# Patient Record
Sex: MALE | Race: WHITE | NOT HISPANIC OR LATINO | Employment: OTHER | URBAN - METROPOLITAN AREA
[De-identification: names, ages, dates, MRNs, and addresses within clinical notes are randomized per-mention and may not be internally consistent; named-entity substitution may affect disease eponyms.]

---

## 2021-01-18 ENCOUNTER — HOSPITAL ENCOUNTER (OUTPATIENT)
Facility: CLINIC | Age: 61
Setting detail: SPECIMEN
Discharge: HOME | End: 2021-01-18
Admitting: INTERNAL MEDICINE
Payer: COMMERCIAL

## 2021-01-20 ENCOUNTER — TELEPHONE (OUTPATIENT)
Dept: TRANSPLANT | Facility: CLINIC | Age: 61
End: 2021-01-20

## 2021-01-20 DIAGNOSIS — C91.00 ALL (ACUTE LYMPHOCYTIC LEUKEMIA) (H): Primary | ICD-10-CM

## 2021-02-02 ENCOUNTER — HOSPITAL ENCOUNTER (OUTPATIENT)
Facility: CLINIC | Age: 61
Setting detail: SPECIMEN
Discharge: HOME | End: 2021-02-02
Admitting: INTERNAL MEDICINE
Payer: COMMERCIAL

## 2021-02-04 ENCOUNTER — ALLIED HEALTH/NURSE VISIT (OUTPATIENT)
Dept: TRANSPLANT | Facility: CLINIC | Age: 61
End: 2021-02-04
Attending: INTERNAL MEDICINE
Payer: COMMERCIAL

## 2021-02-04 ENCOUNTER — RESULTS ONLY (OUTPATIENT)
Dept: OTHER | Facility: CLINIC | Age: 61
End: 2021-02-04

## 2021-02-04 ENCOUNTER — MEDICAL CORRESPONDENCE (OUTPATIENT)
Dept: TRANSPLANT | Facility: CLINIC | Age: 61
End: 2021-02-04

## 2021-02-04 VITALS — SYSTOLIC BLOOD PRESSURE: 144 MMHG | WEIGHT: 245 LBS | DIASTOLIC BLOOD PRESSURE: 78 MMHG | HEART RATE: 86 BPM

## 2021-02-04 DIAGNOSIS — C91.00 ALL (ACUTE LYMPHOCYTIC LEUKEMIA) (H): Primary | ICD-10-CM

## 2021-02-04 DIAGNOSIS — Z71.9 VISIT FOR COUNSELING: Primary | ICD-10-CM

## 2021-02-04 DIAGNOSIS — C91.00 ACUTE LYMPHOBLASTIC LEUKEMIA (ALL) NOT HAVING ACHIEVED REMISSION (H): Primary | ICD-10-CM

## 2021-02-04 PROCEDURE — 36415 COLL VENOUS BLD VENIPUNCTURE: CPT

## 2021-02-04 PROCEDURE — 81382 HLA II TYPING 1 LOC HR: CPT | Performed by: INTERNAL MEDICINE

## 2021-02-04 PROCEDURE — 81378 HLA I & II TYPING HR: CPT | Performed by: INTERNAL MEDICINE

## 2021-02-04 PROCEDURE — 99205 OFFICE O/P NEW HI 60 MIN: CPT | Performed by: INTERNAL MEDICINE

## 2021-02-04 PROCEDURE — 86832 HLA CLASS I HIGH DEFIN QUAL: CPT | Performed by: INTERNAL MEDICINE

## 2021-02-04 PROCEDURE — 86828 HLA CLASS I&II ANTIBODY QUAL: CPT | Performed by: INTERNAL MEDICINE

## 2021-02-04 PROCEDURE — G0463 HOSPITAL OUTPT CLINIC VISIT: HCPCS

## 2021-02-04 PROCEDURE — 999N001098 HC STATISTIC HLA ABY PRA SCREEN CLASS II: Performed by: INTERNAL MEDICINE

## 2021-02-04 RX ORDER — ALLOPURINOL 100 MG/1
100 TABLET ORAL DAILY
COMMUNITY
Start: 2021-01-13 | End: 2021-07-08

## 2021-02-04 RX ORDER — LORAZEPAM 1 MG/1
1 TABLET ORAL
COMMUNITY
Start: 2021-01-13 | End: 2021-11-09

## 2021-02-04 RX ORDER — ATORVASTATIN CALCIUM 20 MG/1
20 TABLET, FILM COATED ORAL DAILY
COMMUNITY
Start: 2019-07-08 | End: 2021-07-08

## 2021-02-04 RX ORDER — ACETAMINOPHEN 500 MG
500-1000 TABLET ORAL EVERY 6 HOURS PRN
COMMUNITY
End: 2021-10-19

## 2021-02-04 RX ORDER — METAXALONE 400 MG/1
800 TABLET ORAL PRN
Status: ON HOLD | COMMUNITY
Start: 2021-01-08 | End: 2021-08-20

## 2021-02-04 RX ORDER — HYDROCHLOROTHIAZIDE 12.5 MG/1
12.5 TABLET ORAL DAILY
COMMUNITY
Start: 2021-01-30 | End: 2021-07-08

## 2021-02-04 RX ORDER — LEVOTHYROXINE SODIUM 200 UG/1
200 TABLET ORAL DAILY
COMMUNITY
Start: 2021-01-11 | End: 2021-07-08

## 2021-02-04 RX ORDER — DEXAMETHASONE 4 MG/1
4 TABLET ORAL PRN
COMMUNITY
Start: 2021-01-13 | End: 2021-07-08

## 2021-02-04 RX ORDER — SULFAMETHOXAZOLE/TRIMETHOPRIM 800-160 MG
1 TABLET ORAL 2 TIMES DAILY
COMMUNITY
Start: 2021-01-13 | End: 2021-07-08

## 2021-02-04 NOTE — NURSING NOTE
Oncology Rooming Note    February 4, 2021 12:20 PM   Nik Nava is a 60 year old male who presents for:    Chief Complaint   Patient presents with     Consult     LEUKEMIA     Initial Vitals: BP (!) 144/78   Pulse 86   Wt 111.1 kg (245 lb)  There is no height or weight on file to calculate BMI. There is no height or weight on file to calculate BSA.  Data Unavailable Comment: Data Unavailable   No LMP for male patient.  Allergies reviewed: Yes  Medications reviewed: Yes    Medications: Medication refills not needed today.  Pharmacy name entered into EPIC: Data Unavailable    Clinical concerns: Pt developed chest and back rash.     Debbie Rubio, CMA

## 2021-02-04 NOTE — PROGRESS NOTES
BP (!) 144/78   Pulse 86   Wt 111.1 kg (245 lb)     This is a new consultation visit for this 60-year-old man with a new diagnosis of Ph positive ALL.  His past medical history includes hypothyroidism, renal cancer in 2007 and GERD plus chronic renal insufficiency from his post nephrectomy state with a creatinine ranging from 1.3-1.5 over the last 10 years.  He is recently been prescribed low-dose hydrochlorothiazide.    He was not feeling particularly unwell in late 2020,  but eventually had a physical exam and laboratory testing which demonstrated leukocytosis and blasts in the blood.  His hemoglobin and platelets were normal but his white count was 13.6;  40% peripheral blasts and a bone marrow biopsy was hypercellular with 80% blasts.  BCR abl  P190 was recognized and cytogenetics showed a [place] chromosome and additionally monosomy 7 in 14 of 20 metaphases.  Flow showed the blasts positive for CD34, 10,58,38,19,13 and Tdt.     Spinal fluid exam showed 1 white cell 3 red cells but 11% of the cells by flow were lymphoblasts so his CSF is positive, though at a low level.  Scans showed no lymphadenopathy or hepatosplenomegaly and a MRI scan of his head was unremarkable.    He has been initiated on therapy with a week of dexamethasone and daily dasatinib 140 mg.  The dexamethasone led to tremors shakiness and feeling unwell but has had no acute symptoms that are continuing after the dexamethasone was discontinued.  In the last few days he has developed a nonpruritic erythematous rash over his upper chest, all over his back and his neck.  He was not aware of it but his wife pointed it out.  His medications potentially implicated in the rash include trimethoprim sulfa and low-dose allopurinol.    He is still working in an office.  He used to be a smoker but no longer..  He is not aware of any known food or drug allergies.  Family history includes lung cancer in his mother but no other family history of blood  disorders.  He has 4 healthy siblings and 3 healthy children in their late 30s or early 40s.  HLA typing on his siblings are pending.    His recent review of systems identifies a bit of fatigue, the new rash but no bleeding bruising bone discomfort GI  or respiratory symptoms.  Maybe his energy is a bit down but it is modest.    His exam shows his weight unchanged in the last 3 months and his vital signs were acceptable.  He had a nonraised erythematous macular rash on the upper chest all over his back and spreading up his neck.  He had no peripheral edema or bone tenderness in any site.  He had no cervical supraclavicular axillary or inguinal lymphadenopathy.  His oropharynx is clear without mucosal lesions.  His neck was supple.  His lungs are clear without rales or wheezing.  His heart tones are regular without a gallop. he had no palpable hepatosplenomegaly or abdominal masses.  A limited neurologic exam showed intact cranial nerves, trace at most deep tendon reflexes, but heel toe Romberg finger-nose and rapid alternating movement testing was negative.  (He denied any neuropathy or paresthesias  from his renal cancer treatment 13 years ago and has no recollection of such, despite his nearly absent reflexes).    Recent laboratory testing showed a creatinine between 1.3 and 1.5, the positive low level spinal fluid abnormality, slightly elevated LDH at diagnosis but now his blood counts responding to the dasatinib and labs from February 2 showed a hemoglobin of 11.3, white count 3700 platelets 107,000.     We discussed the relevant management of Ph positive  ALL. The initial treatment with dexamethasone and dasatinib is a good start but he will likely need conventional multidrug chemotherapy beginning soon.  It would be reasonable to, in the absence of circulating blasts, repeat a marrow at 4 weeks of dasatinib and then consider consolidation therapy with either vincristine prednisone Adriamycin Lasparaginase or  hyper CVAD.  He will need intrathecal methotrexate or high-dose methotrexate plus rescue for treatment of  the low level CSF leukemia that is been recognized.    We spent the majority of time discussing consolidation post remission therapy if he achieves a complete molecular remission.  We reviewed the rationale of allogeneic hematopoietic cell transplantation using either a sibling, unrelated donor, umbilical cord blood or a haploidentical child as the donor and discussed how we make decisions which will to some extent be pursued after his siblings' tissue typing is completed.  We reviewed the plan to use  intermediate intensity chemotherapy including low-dose radiation for tumor depletion and immunosuppression followed by infusion of the donor graft and depending on the donor type additional medications for khsvb-thgohc-qrgn disease prophylaxis.    We reviewed the possible complications of treatment including infections need for transfusion risks of graft failure, oizyw-qwbpis-fqhv disease and risks of relapsed leukemia.  Post transplant TKI maintenance therapy would be indicated for at least 1 year following transplantation.    The next decision steps are dependent on his response to his current reinduction therapy with dasatinib and dexamethasone and follow-up consolidation or further induction therapy.  We will inform him and his outside physician of the results of donor identification process and today he signed consent to participate in the blood marrow transplant clinical trials network (BMT CTN) observational study of follow-up and microbiome analyses if he has an alternative donor transplant should his siblings not match.    His questions were answered in full and we will be discussing more as his course unfolds.  We are hopeful that he will achieve remission promptly and would then be a good candidate for allograft.    HLA typing and Anti HLA antibody testing was done today.    Chapo Hill  MD    Professor of medicine

## 2021-02-04 NOTE — NURSING NOTE
Peripheral labs drawn from right arm in clinic by writer.     Jolanta Mcintyre CMA February 4, 2021

## 2021-02-04 NOTE — LETTER
2/4/2021         RE: Nik Nava  [address] [facility] [id number]        Dear Colleague,    Thank you for referring your patient, Nik Nava, to the [facility] BLOOD AND MARROW TRANSPLANT PROGRAM [place]. Please see a copy of my visit note below.    BP (!) 144/78   Pulse 86   Wt 111.1 kg (245 lb)     This is a new consultation visit for this 60-year-old man with a new diagnosis of Ph positive ALL.  His past medical history includes hypothyroidism, renal cancer in 2007 and GERD plus chronic renal insufficiency from his post nephrectomy state with a creatinine ranging from 1.3-1.5 over the last 10 years.  He is recently been prescribed low-dose hydrochlorothiazide.    He was not feeling particularly unwell in late 2020,  but eventually had a physical exam and laboratory testing which demonstrated leukocytosis and blasts in the blood.  His hemoglobin and platelets were normal but his white count was 13.6;  40% peripheral blasts and a bone marrow biopsy was hypercellular with 80% blasts.  BCR abl  P190 was recognized and cytogenetics showed a [place] chromosome and additionally monosomy 7 in 14 of 20 metaphases.  Flow showed the blasts positive for CD34, 10,58,38,19,13 and Tdt.     Spinal fluid exam showed 1 white cell 3 red cells but 11% of the cells by flow were lymphoblasts so his CSF is positive, though at a low level.  Scans showed no lymphadenopathy or hepatosplenomegaly and a MRI scan of his head was unremarkable.    He has been initiated on therapy with a week of dexamethasone and daily dasatinib 140 mg.  The dexamethasone led to tremors shakiness and feeling unwell but has had no acute symptoms that are continuing after the dexamethasone was discontinued.  In the last few days he has developed a nonpruritic erythematous rash over his upper chest, all over his back and his neck.  He was not aware of it but his wife pointed it out.  His medications potentially implicated  in the rash include trimethoprim sulfa and low-dose allopurinol.    He is still working in an office.  He used to be a smoker but no longer..  He is not aware of any known food or drug allergies.  Family history includes lung cancer in his mother but no other family history of blood disorders.  He has 4 healthy siblings and 3 healthy children in their late 30s or early 40s.  HLA typing on his siblings are pending.    His recent review of systems identifies a bit of fatigue, the new rash but no bleeding bruising bone discomfort GI  or respiratory symptoms.  Maybe his energy is a bit down but it is modest.    His exam shows his weight unchanged in the last 3 months and his vital signs were acceptable.  He had a nonraised erythematous macular rash on the upper chest all over his back and spreading up his neck.  He had no peripheral edema or bone tenderness in any site.  He had no cervical supraclavicular axillary or inguinal lymphadenopathy.  His oropharynx is clear without mucosal lesions.  His neck was supple.  His lungs are clear without rales or wheezing.  His heart tones are regular without a gallop. he had no palpable hepatosplenomegaly or abdominal masses.  A limited neurologic exam showed intact cranial nerves, trace at most deep tendon reflexes, but heel toe Romberg finger-nose and rapid alternating movement testing was negative.  (He denied any neuropathy or paresthesias  from his renal cancer treatment 13 years ago and has no recollection of such, despite his nearly absent reflexes).    Recent laboratory testing showed a creatinine between 1.3 and 1.5, the positive low level spinal fluid abnormality, slightly elevated LDH at diagnosis but now his blood counts responding to the dasatinib and labs from February 2 showed a hemoglobin of 11.3, white count 3700 platelets 107,000.     We discussed the relevant management of Ph positive  ALL. The initial treatment with dexamethasone and dasatinib is a good start  but he will likely need conventional multidrug chemotherapy beginning soon.  It would be reasonable to, in the absence of circulating blasts, repeat a marrow at 4 weeks of dasatinib and then consider consolidation therapy with either vincristine prednisone Adriamycin Lasparaginase or hyper CVAD.  He will need intrathecal methotrexate or high-dose methotrexate plus rescue for treatment of  the low level CSF leukemia that is been recognized.    We spent the majority of time discussing consolidation post remission therapy if he achieves a complete molecular remission.  We reviewed the rationale of allogeneic hematopoietic cell transplantation using either a sibling, unrelated donor, umbilical cord blood or a haploidentical child as the donor and discussed how we make decisions which will to some extent be pursued after his siblings' tissue typing is completed.  We reviewed the plan to use  intermediate intensity chemotherapy including low-dose radiation for tumor depletion and immunosuppression followed by infusion of the donor graft and depending on the donor type additional medications for acpeb-bgprgv-cbbs disease prophylaxis.    We reviewed the possible complications of treatment including infections need for transfusion risks of graft failure, wgzft-kwkwjj-pnbb disease and risks of relapsed leukemia.  Post transplant TKI maintenance therapy would be indicated for at least 1 year following transplantation.    The next decision steps are dependent on his response to his current reinduction therapy with dasatinib and dexamethasone and follow-up consolidation or further induction therapy.  We will inform him and his outside physician of the results of donor identification process and today he signed consent to participate in the blood marrow transplant clinical trials network (BMT CTN) observational study of follow-up and microbiome analyses if he has an alternative donor transplant should his siblings not  match.    His questions were answered in full and we will be discussing more as his course unfolds.  We are hopeful that he will achieve remission promptly and would then be a good candidate for allograft.    HLA typing and Anti HLA antibody testing was done today.    Chapo Hill MD    Professor of medicine

## 2021-02-05 NOTE — PROGRESS NOTES
"Blood and Marrow Transplant   New Transplant Visit with   Clinical     Assessment completed on 21 via phone as part of the COVID 19 protocol. Assessment of living situation, support system, financial status, functional status, coping, stressors, need for resources and social work intervention provided as needed. Information for this assessment was provided by pt and pt's spouse's report in addition to medical chart review and consultation with medical team.     Present:  Patient: Nik Nava  Spouse: Lamar Nava  : MATEO Garcia, [facility]    Medical Team   Nurse Coordinator: Jessika Starkey RN  BMT Physician: Chapo Hill MD  Referring Physician: Wojciech Soares MD    Presenting Information:  Pt is a 60 year old male diagnosed with Acute Lymphoblastic Leukemia. Pt was diagnosed on 2021. Pt presents for an allogeneic stem cell transplant discussion.    Contact Information:  Cell Phone: [phone]  Home Phone: [phone]  Wife Cell Phone: [phone]    Special Needs:   No needs identified at this time.     Relocation Requirement:   Pt lives in [place], [place]28 miles[place] [facility] within the required distance of the hospital. Pt does not need to relocate.     Living Situation:   Pt lives in a house w/ his spouse Lamar.    Family Information:   Spouse: Lamar Nava   Parents:   Siblings: Valentín, Mau, Jenise, & Uma (all live in the [facility] area)  Children: 3 -Son Chapo, Dtr Bouchra Fisher, & Dtr Lisa  Pt & spouse have 13 grandchildren.    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 No insurance concerns identified at this time. SW " provided information regarding the insurance authorization process and the role of the BMT Financial . SW provided contact info for the BMT Financial  and referred pt to them for future insurance questions.     Finances:   Pt & spouse are supported by their employment income and report add'l assets. Pt states he was encouraged by Dr. Hill to make arrangements for increased time off work. Pt had questions re: applying for SSDI. SW answered questions as able as well as referred Pt to Cancer Legal Line for add'l assistance. No financial concerns identified at this time. SW discussed aziza options and asked pt to let SW know if they would like to apply in the future.     Caregiver:   SW discussed with the patient and his spouse the caregiver role and expectation at length. Pt is agreeable to having a full time caregiver for the minimum of 100 days until cleared by the BMT Physician. Pt's identified caregiver is his wife Lamar. Pt states his older brothers and their wives are retired and may be able to assist as well. Caregiver education and information provided. No caregiver concerns identified.     Healthcare Directive:  Yes. Pt has a health care directive and will bring it when they return to the BMT program.     Resources Provided:  -BMT Information Book  -BMT Resources Packet  -Caregiver Contract/Description  -Transplant Unit Description and Information     Identified Concerns:  No concerns identified at this time.     Summary:  Pt presents to [facility] regarding an allogeneic stem cell transplant. Pt and pt's spouse asked good/appropriate questions regarding psychosocial factors related to BMT; all questions were addressed. Pt presented as pleasant, engaged, open to SW visit. Pt's affect was appropriate. Family's affect was appropriate and supportive.     Plan:   SW provided contact information and encouraged pt to contact SW with any additional questions,  concerns, resources and/or for support. SW will continue to follow pt to provide support and guidance with resources as needed.     MATEO Garcia, [facility]  Adult Blood & Marrow Transplant   Phone: [phone]  Pager: [phone]

## 2021-02-07 NOTE — PROGRESS NOTES
Met with pt and family today at hisconsult for possible allo transplant. I reviewed the role of the Nurse coordinator, and gave the pt contact information if they have any other questions. Pt has 4 sibs to be typed, 3 kids who will not be typed at this time. Also signed 1702 and urd consent today. Hla/pra drawn. Pt will call us with sib info

## 2021-02-08 LAB
SA1 CELL: NORMAL
SA1 COMMENTS: NORMAL
SA1 HI RISK ABY: NORMAL
SA1 MOD RISK ABY: NORMAL
SA1 TEST METHOD: NORMAL
SCR 1 TEST METHOD: NORMAL
SCR1 CELL: NORMAL
SCR1 COMMENTS: NORMAL
SCR1 RESULT: NORMAL
SCR2 CELL: NORMAL
SCR2 COMMENTS: NORMAL
SCR2 RESULT: NORMAL
SCR2 TEST METHOD: NORMAL

## 2021-02-11 LAB
A*LOCUS SEROLOGIC EQUIVALENT: 3
A*LOCUS: NORMAL
AB TEST METHOD: NORMAL
B*: NORMAL
B*LOCUS SEROLOGIC EQUIVALENT: 7
B*LOCUS: NORMAL
B*SEROLOGIC EQUIVALENT: 49
BW-1: NORMAL
BW-2: NORMAL
C*: NORMAL
C*LOCUS SEROLOGIC EQUIVALENT: 7
C*LOCUS: NORMAL
C*SEROLOGIC EQUIVALENT: 7
DPA1*: NORMAL
DPB1*: NORMAL
DPB1*LOCUS NMDP: NORMAL
DPB1*LOCUS: NORMAL
DPB1*NMDP: NORMAL
DQA1*: NORMAL
DQA1*LOCUS: NORMAL
DQB1*: NORMAL
DQB1*LOCUS SEROLOGIC EQUIVALENT: 7
DQB1*LOCUS: NORMAL
DQB1*SEROLOGIC EQUIVALENT: 6
DRB1*: NORMAL
DRB1*LOCUS SEROLOGIC EQUIVALENT: 13
DRB1*LOCUS: NORMAL
DRB1*SEROLOGIC EQUIVALENT: 4
DRB3*LOCUS SEROLOGIC EQUIVALENT: 52
DRB3*LOCUS: NORMAL
DRB4*: NORMAL
DRB4*SEROLOGIC EQUIVALENT: 53
DRSSO TEST METHOD: NORMAL

## 2021-03-15 ENCOUNTER — DOCUMENTATION ONLY (OUTPATIENT)
Dept: TRANSPLANT | Facility: CLINIC | Age: 61
End: 2021-03-15

## 2021-03-15 NOTE — PROGRESS NOTES
Nik Nava's medical conditions were reviewed at the BMT Protocol Review Committee meeting on March 15, 2021    Considerations from the Committee included the following: Ph+ ALL with baseline Cr 1.3-1.5    BMT Primary Protocol: CTN 1703 (alternative MT 2015-32)    BMT Ancillary Protocols:     CTN 1702    If in [id number] may also be FMT 2018-01    CTN 1704    There is no problem list on file for this patient.      Patient Care Team       Relationship Specialty Notifications Start Kisha Hernandez MD PCP - General Family Medicine  1/25/21     Wojciech Soares MD Medical Oncology  1/31/21     Phone: [phone] Fax: [phone]         [place] [facility] [address] [address] [facility] [address]    Chapo Hill MD Assigned Cancer Care Provider   2/7/21     Phone: [phone] Fax: [phone]         [address] [facility] [address] [facility] [address]

## 2021-04-11 ENCOUNTER — HEALTH MAINTENANCE LETTER (OUTPATIENT)
Age: 61
End: 2021-04-11

## 2021-06-07 ENCOUNTER — MEDICAL CORRESPONDENCE (OUTPATIENT)
Dept: TRANSPLANT | Facility: CLINIC | Age: 61
End: 2021-06-07

## 2021-07-01 DIAGNOSIS — Z11.59 SPECIAL SCREENING EXAMINATION FOR VIRAL DISEASE: ICD-10-CM

## 2021-07-01 DIAGNOSIS — C91.01 ACUTE LYMPHOBLASTIC LEUKEMIA (ALL) IN REMISSION (H): ICD-10-CM

## 2021-07-01 DIAGNOSIS — Z01.818 PREOP EXAMINATION: ICD-10-CM

## 2021-07-02 RX ORDER — HEPARIN SODIUM (PORCINE) LOCK FLUSH IV SOLN 100 UNIT/ML 100 UNIT/ML
5 SOLUTION INTRAVENOUS
Status: CANCELLED | OUTPATIENT
Start: 2021-07-08

## 2021-07-02 RX ORDER — HEPARIN SODIUM,PORCINE 10 UNIT/ML
5 VIAL (ML) INTRAVENOUS
Status: CANCELLED | OUTPATIENT
Start: 2021-07-08

## 2021-07-06 ENCOUNTER — TELEPHONE (OUTPATIENT)
Dept: TRANSPLANT | Facility: CLINIC | Age: 61
End: 2021-07-06

## 2021-07-06 NOTE — TELEPHONE ENCOUNTER
Lamar Nava called to get clarifications on work up schedule.     Confirmed hold Xarelto for 2 days prior to lumbar puncture (sat and sun evenings).

## 2021-07-08 ENCOUNTER — MEDICAL CORRESPONDENCE (OUTPATIENT)
Dept: TRANSPLANT | Facility: CLINIC | Age: 61
End: 2021-07-08

## 2021-07-08 ENCOUNTER — ANCILLARY PROCEDURE (OUTPATIENT)
Dept: GENERAL RADIOLOGY | Facility: CLINIC | Age: 61
End: 2021-07-08
Attending: INTERNAL MEDICINE
Payer: COMMERCIAL

## 2021-07-08 ENCOUNTER — APPOINTMENT (OUTPATIENT)
Dept: LAB | Facility: CLINIC | Age: 61
End: 2021-07-08
Attending: INTERNAL MEDICINE
Payer: COMMERCIAL

## 2021-07-08 ENCOUNTER — ALLIED HEALTH/NURSE VISIT (OUTPATIENT)
Dept: TRANSPLANT | Facility: CLINIC | Age: 61
End: 2021-07-08
Attending: INTERNAL MEDICINE
Payer: COMMERCIAL

## 2021-07-08 ENCOUNTER — ONCOLOGY VISIT (OUTPATIENT)
Dept: TRANSPLANT | Facility: CLINIC | Age: 61
End: 2021-07-08
Attending: PHYSICIAN ASSISTANT
Payer: COMMERCIAL

## 2021-07-08 ENCOUNTER — RESULTS ONLY (OUTPATIENT)
Dept: TRANSPLANT | Facility: CLINIC | Age: 61
End: 2021-07-08

## 2021-07-08 VITALS
OXYGEN SATURATION: 98 % | WEIGHT: 236 LBS | BODY MASS INDEX: 31.28 KG/M2 | DIASTOLIC BLOOD PRESSURE: 61 MMHG | HEART RATE: 85 BPM | SYSTOLIC BLOOD PRESSURE: 136 MMHG | HEIGHT: 73 IN | RESPIRATION RATE: 18 BRPM | TEMPERATURE: 97.5 F

## 2021-07-08 DIAGNOSIS — C91.01 ACUTE LYMPHOBLASTIC LEUKEMIA (ALL) IN REMISSION (H): ICD-10-CM

## 2021-07-08 DIAGNOSIS — Z01.818 PREOP EXAMINATION: ICD-10-CM

## 2021-07-08 DIAGNOSIS — Z11.59 SPECIAL SCREENING EXAMINATION FOR VIRAL DISEASE: ICD-10-CM

## 2021-07-08 DIAGNOSIS — Z01.818 PREOP EXAMINATION: Primary | ICD-10-CM

## 2021-07-08 LAB
ABO + RH BLD: NORMAL
ABO + RH BLD: NORMAL
ALBUMIN SERPL-MCNC: 3.7 G/DL (ref 3.4–5)
ALBUMIN UR-MCNC: NEGATIVE MG/DL
ALP SERPL-CCNC: 60 U/L (ref 40–150)
ALT SERPL W P-5'-P-CCNC: 28 U/L (ref 0–70)
ANION GAP SERPL CALCULATED.3IONS-SCNC: 7 MMOL/L (ref 3–14)
APPEARANCE UR: CLEAR
APTT PPP: 32 SEC (ref 22–37)
AST SERPL W P-5'-P-CCNC: 22 U/L (ref 0–45)
BASOPHILS # BLD AUTO: 0 10E9/L (ref 0–0.2)
BASOPHILS NFR BLD AUTO: 0.5 %
BILIRUB SERPL-MCNC: 0.3 MG/DL (ref 0.2–1.3)
BILIRUB UR QL STRIP: NEGATIVE
BLD GP AB SCN SERPL QL: NORMAL
BLOOD BANK CMNT PATIENT-IMP: NORMAL
BUN SERPL-MCNC: 24 MG/DL (ref 7–30)
CALCIUM SERPL-MCNC: 8.9 MG/DL (ref 8.5–10.1)
CHLORIDE SERPL-SCNC: 108 MMOL/L (ref 94–109)
CO2 SERPL-SCNC: 23 MMOL/L (ref 20–32)
COLOR UR AUTO: YELLOW
CREAT SERPL-MCNC: 0.94 MG/DL (ref 0.66–1.25)
DIFFERENTIAL METHOD BLD: ABNORMAL
EOSINOPHIL # BLD AUTO: 0 10E9/L (ref 0–0.7)
EOSINOPHIL NFR BLD AUTO: 0.1 %
ERYTHROCYTE [DISTWIDTH] IN BLOOD BY AUTOMATED COUNT: 19.6 % (ref 10–15)
FERRITIN SERPL-MCNC: 762 NG/ML (ref 26–388)
GFR SERPL CREATININE-BSD FRML MDRD: 86 ML/MIN/{1.73_M2}
GLUCOSE SERPL-MCNC: 107 MG/DL (ref 70–99)
GLUCOSE UR STRIP-MCNC: NEGATIVE MG/DL
HBV CORE AB SERPL QL IA: NONREACTIVE
HBV SURFACE AG SERPL QL IA: NONREACTIVE
HCT VFR BLD AUTO: 25.9 % (ref 40–53)
HCV AB SERPL QL IA: NONREACTIVE
HGB BLD-MCNC: 8.5 G/DL (ref 13.3–17.7)
HGB UR QL STRIP: ABNORMAL
HIV 1+2 AB+HIV1 P24 AG SERPL QL IA: NONREACTIVE
IMM GRANULOCYTES # BLD: 0.1 10E9/L (ref 0–0.4)
IMM GRANULOCYTES NFR BLD: 0.7 %
INR PPP: 0.98 (ref 0.86–1.14)
KETONES UR STRIP-MCNC: NEGATIVE MG/DL
LABORATORY COMMENT REPORT: NORMAL
LEUKOCYTE ESTERASE UR QL STRIP: NEGATIVE
LYMPHOCYTES # BLD AUTO: 1 10E9/L (ref 0.8–5.3)
LYMPHOCYTES NFR BLD AUTO: 10.7 %
MCH RBC QN AUTO: 33.6 PG (ref 26.5–33)
MCHC RBC AUTO-ENTMCNC: 32.8 G/DL (ref 31.5–36.5)
MCV RBC AUTO: 102 FL (ref 78–100)
MONOCYTES # BLD AUTO: 0.6 10E9/L (ref 0–1.3)
MONOCYTES NFR BLD AUTO: 7.2 %
NEUTROPHILS # BLD AUTO: 7.2 10E9/L (ref 1.6–8.3)
NEUTROPHILS NFR BLD AUTO: 80.8 %
NITRATE UR QL: NEGATIVE
NRBC # BLD AUTO: 0 10*3/UL
NRBC BLD AUTO-RTO: 0 /100
PH UR STRIP: 5 PH (ref 5–7)
PLATELET # BLD AUTO: 451 10E9/L (ref 150–450)
POTASSIUM SERPL-SCNC: 3.9 MMOL/L (ref 3.4–5.3)
PROT SERPL-MCNC: 7.3 G/DL (ref 6.8–8.8)
RBC # BLD AUTO: 2.53 10E12/L (ref 4.4–5.9)
RBC #/AREA URNS AUTO: 3 /HPF (ref 0–2)
SARS-COV-2 RNA RESP QL NAA+PROBE: NEGATIVE
SARS-COV-2 RNA RESP QL NAA+PROBE: NORMAL
SODIUM SERPL-SCNC: 139 MMOL/L (ref 133–144)
SOURCE: ABNORMAL
SP GR UR STRIP: 1.01 (ref 1–1.03)
SPECIMEN EXP DATE BLD: NORMAL
SPECIMEN SOURCE: NORMAL
SPECIMEN SOURCE: NORMAL
T PALLIDUM AB SER QL: NONREACTIVE
URATE SERPL-MCNC: 4.8 MG/DL (ref 3.5–7.2)
UROBILINOGEN UR STRIP-MCNC: 0 MG/DL (ref 0–2)
WBC # BLD AUTO: 8.9 10E9/L (ref 4–11)
WBC #/AREA URNS AUTO: <1 /HPF (ref 0–5)

## 2021-07-08 PROCEDURE — 80053 COMPREHEN METABOLIC PANEL: CPT | Performed by: INTERNAL MEDICINE

## 2021-07-08 PROCEDURE — 86695 HERPES SIMPLEX TYPE 1 TEST: CPT | Performed by: INTERNAL MEDICINE

## 2021-07-08 PROCEDURE — 93010 ELECTROCARDIOGRAM REPORT: CPT | Performed by: INTERNAL MEDICINE

## 2021-07-08 PROCEDURE — 86803 HEPATITIS C AB TEST: CPT | Performed by: INTERNAL MEDICINE

## 2021-07-08 PROCEDURE — 86780 TREPONEMA PALLIDUM: CPT | Performed by: INTERNAL MEDICINE

## 2021-07-08 PROCEDURE — 81001 URINALYSIS AUTO W/SCOPE: CPT | Performed by: INTERNAL MEDICINE

## 2021-07-08 PROCEDURE — 87521 HEPATITIS C PROBE&RVRS TRNSC: CPT | Performed by: INTERNAL MEDICINE

## 2021-07-08 PROCEDURE — 250N000011 HC RX IP 250 OP 636

## 2021-07-08 PROCEDURE — 86900 BLOOD TYPING SEROLOGIC ABO: CPT | Performed by: INTERNAL MEDICINE

## 2021-07-08 PROCEDURE — U0005 INFEC AGEN DETEC AMPLI PROBE: HCPCS | Performed by: INTERNAL MEDICINE

## 2021-07-08 PROCEDURE — 85730 THROMBOPLASTIN TIME PARTIAL: CPT | Performed by: INTERNAL MEDICINE

## 2021-07-08 PROCEDURE — 86753 PROTOZOA ANTIBODY NOS: CPT | Performed by: INTERNAL MEDICINE

## 2021-07-08 PROCEDURE — 87798 DETECT AGENT NOS DNA AMP: CPT | Performed by: INTERNAL MEDICINE

## 2021-07-08 PROCEDURE — 87516 HEPATITIS B DNA AMP PROBE: CPT | Performed by: INTERNAL MEDICINE

## 2021-07-08 PROCEDURE — 85610 PROTHROMBIN TIME: CPT | Performed by: INTERNAL MEDICINE

## 2021-07-08 PROCEDURE — 71046 X-RAY EXAM CHEST 2 VIEWS: CPT | Mod: GC | Performed by: RADIOLOGY

## 2021-07-08 PROCEDURE — 86704 HEP B CORE ANTIBODY TOTAL: CPT | Performed by: INTERNAL MEDICINE

## 2021-07-08 PROCEDURE — 81265 STR MARKERS SPECIMEN ANAL: CPT | Performed by: INTERNAL MEDICINE

## 2021-07-08 PROCEDURE — 86665 EPSTEIN-BARR CAPSID VCA: CPT | Performed by: INTERNAL MEDICINE

## 2021-07-08 PROCEDURE — 85025 COMPLETE CBC W/AUTO DIFF WBC: CPT | Performed by: INTERNAL MEDICINE

## 2021-07-08 PROCEDURE — 82728 ASSAY OF FERRITIN: CPT | Performed by: INTERNAL MEDICINE

## 2021-07-08 PROCEDURE — 87389 HIV-1 AG W/HIV-1&-2 AB AG IA: CPT | Performed by: INTERNAL MEDICINE

## 2021-07-08 PROCEDURE — 99215 OFFICE O/P EST HI 40 MIN: CPT | Mod: 25

## 2021-07-08 PROCEDURE — 81378 HLA I & II TYPING HR: CPT | Performed by: INTERNAL MEDICINE

## 2021-07-08 PROCEDURE — 86790 VIRUS ANTIBODY NOS: CPT | Performed by: INTERNAL MEDICINE

## 2021-07-08 PROCEDURE — 86901 BLOOD TYPING SEROLOGIC RH(D): CPT | Performed by: INTERNAL MEDICINE

## 2021-07-08 PROCEDURE — 87340 HEPATITIS B SURFACE AG IA: CPT | Performed by: INTERNAL MEDICINE

## 2021-07-08 PROCEDURE — 87535 HIV-1 PROBE&REVERSE TRNSCRPJ: CPT | Performed by: INTERNAL MEDICINE

## 2021-07-08 PROCEDURE — 81382 HLA II TYPING 1 LOC HR: CPT | Performed by: INTERNAL MEDICINE

## 2021-07-08 PROCEDURE — U0003 INFECTIOUS AGENT DETECTION BY NUCLEIC ACID (DNA OR RNA); SEVERE ACUTE RESPIRATORY SYNDROME CORONAVIRUS 2 (SARS-COV-2) (CORONAVIRUS DISEASE [COVID-19]), AMPLIFIED PROBE TECHNIQUE, MAKING USE OF HIGH THROUGHPUT TECHNOLOGIES AS DESCRIBED BY CMS-2020-01-R: HCPCS | Performed by: INTERNAL MEDICINE

## 2021-07-08 PROCEDURE — 999N001031 HC STATISTIC REV BONE MARROW OUTSIDE SLIDES TC 88321: Performed by: INTERNAL MEDICINE

## 2021-07-08 PROCEDURE — G0463 HOSPITAL OUTPT CLINIC VISIT: HCPCS

## 2021-07-08 PROCEDURE — 86644 CMV ANTIBODY: CPT | Performed by: INTERNAL MEDICINE

## 2021-07-08 PROCEDURE — 86850 RBC ANTIBODY SCREEN: CPT | Performed by: INTERNAL MEDICINE

## 2021-07-08 PROCEDURE — 86696 HERPES SIMPLEX TYPE 2 TEST: CPT | Performed by: INTERNAL MEDICINE

## 2021-07-08 PROCEDURE — G0463 HOSPITAL OUTPT CLINIC VISIT: HCPCS | Mod: 25

## 2021-07-08 PROCEDURE — 84550 ASSAY OF BLOOD/URIC ACID: CPT | Performed by: INTERNAL MEDICINE

## 2021-07-08 PROCEDURE — 88321 CONSLTJ&REPRT SLD PREP ELSWR: CPT | Performed by: STUDENT IN AN ORGANIZED HEALTH CARE EDUCATION/TRAINING PROGRAM

## 2021-07-08 RX ORDER — FUROSEMIDE 40 MG
TABLET ORAL
Status: ON HOLD | COMMUNITY
Start: 2021-03-25 | End: 2021-08-20

## 2021-07-08 RX ORDER — RIVAROXABAN 20 MG/1
20 TABLET, FILM COATED ORAL DAILY
COMMUNITY
Start: 2021-07-07 | End: 2022-03-31

## 2021-07-08 RX ORDER — OXYCODONE HYDROCHLORIDE 5 MG/1
TABLET ORAL
Status: ON HOLD | COMMUNITY
Start: 2021-06-14 | End: 2021-08-20

## 2021-07-08 RX ORDER — HEPARIN SODIUM,PORCINE 10 UNIT/ML
5 VIAL (ML) INTRAVENOUS
Status: CANCELLED | OUTPATIENT
Start: 2021-07-08

## 2021-07-08 RX ORDER — OMEPRAZOLE 40 MG/1
40 CAPSULE, DELAYED RELEASE ORAL DAILY
COMMUNITY
Start: 2021-05-11 | End: 2021-10-19

## 2021-07-08 RX ORDER — FEEDER CONTAINER WITH PUMP SET
1 EACH MISCELLANEOUS 2 TIMES DAILY
COMMUNITY
End: 2021-10-19

## 2021-07-08 RX ORDER — PROCHLORPERAZINE MALEATE 10 MG
TABLET ORAL
Status: ON HOLD | COMMUNITY
Start: 2021-03-04 | End: 2021-08-20

## 2021-07-08 RX ORDER — HEPARIN SODIUM (PORCINE) LOCK FLUSH IV SOLN 100 UNIT/ML 100 UNIT/ML
5 SOLUTION INTRAVENOUS
Status: DISCONTINUED | OUTPATIENT
Start: 2021-07-08 | End: 2021-07-08 | Stop reason: HOSPADM

## 2021-07-08 RX ORDER — PREDNISONE 10 MG/1
10 TABLET ORAL DAILY
Status: ON HOLD | COMMUNITY
Start: 2021-06-24 | End: 2021-08-20

## 2021-07-08 RX ORDER — LEVOTHYROXINE SODIUM 200 UG/1
200 TABLET ORAL DAILY
COMMUNITY
Start: 2021-06-06 | End: 2021-11-18

## 2021-07-08 RX ORDER — LEVOFLOXACIN 500 MG/1
TABLET, FILM COATED ORAL
COMMUNITY
Start: 2021-06-14 | End: 2021-07-08

## 2021-07-08 RX ORDER — HEPARIN SODIUM (PORCINE) LOCK FLUSH IV SOLN 100 UNIT/ML 100 UNIT/ML
5 SOLUTION INTRAVENOUS
Status: CANCELLED | OUTPATIENT
Start: 2021-07-08

## 2021-07-08 RX ORDER — ONDANSETRON 8 MG/1
TABLET, FILM COATED ORAL
COMMUNITY
Start: 2021-03-04 | End: 2021-10-19

## 2021-07-08 RX ORDER — POLYETHYLENE GLYCOL 3350 17 G/17G
17 POWDER, FOR SOLUTION ORAL DAILY PRN
COMMUNITY
End: 2022-04-06

## 2021-07-08 RX ORDER — SENNOSIDES A AND B 8.6 MG/1
17.2 TABLET, FILM COATED ORAL
COMMUNITY
End: 2021-10-19

## 2021-07-08 RX ADMIN — Medication 5 ML: at 10:51

## 2021-07-08 SDOH — HEALTH STABILITY: MENTAL HEALTH: HOW OFTEN DO YOU HAVE 6 OR MORE DRINKS ON ONE OCCASION?: NOT ASKED

## 2021-07-08 SDOH — HEALTH STABILITY: MENTAL HEALTH: HOW OFTEN DO YOU HAVE A DRINK CONTAINING ALCOHOL?: NOT ASKED

## 2021-07-08 SDOH — HEALTH STABILITY: MENTAL HEALTH: HOW MANY STANDARD DRINKS CONTAINING ALCOHOL DO YOU HAVE ON A TYPICAL DAY?: NOT ASKED

## 2021-07-08 ASSESSMENT — MIFFLIN-ST. JEOR: SCORE: 1919.37

## 2021-07-08 ASSESSMENT — PAIN SCALES - GENERAL: PAINLEVEL: NO PAIN (1)

## 2021-07-08 NOTE — NURSING NOTE
Frailty Assessment  was performed today per order.  Name and  verified with patient.    Missy Waterman LPN 2021 12:18 PM

## 2021-07-08 NOTE — NURSING NOTE
"Oncology Rooming Note    July 8, 2021 11:01 AM   Nik Nava is a 61 year old male who presents for:    Chief Complaint   Patient presents with     RECHECK     bmt work up for ALL     Initial Vitals: /61   Pulse 85   Temp 97.5  F (36.4  C)   Resp 18   Ht 1.854 m (6' 1\")   Wt 107 kg (236 lb)   SpO2 98%   BMI 31.14 kg/m   Estimated body mass index is 31.14 kg/m  as calculated from the following:    Height as of this encounter: 1.854 m (6' 1\").    Weight as of this encounter: 107 kg (236 lb). Body surface area is 2.35 meters squared.  No Pain (1) Comment: Data Unavailable   No LMP for male patient.  Allergies reviewed: Yes  Medications reviewed: Yes    Medications: Medication refills not needed today.  Pharmacy name entered into [employer]: [facility] - [place], [place] - [address] [facility]    Clinical concerns: see teaching note       Stephanie Seals RN              "

## 2021-07-08 NOTE — PROGRESS NOTES
BMT Teaching Flowsheet    Nik Nava is a 61 year old male  The primary encounter diagnosis was Preop examination. Diagnoses of Special screening examination for viral disease and 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) were also pertinent to this visit.    Teaching Topic: work up for ALL    Person(s) involved in teaching: Patient and Spouse  Motivation Level  Asks Questions: Yes  Eager to Learn: Yes  Cooperative: Yes  Receptive (willing/able to accept information): Yes  Any cultural factors/Religion beliefs that may influence understanding or compliance? No    Patient and Family demonstrates understanding of the following:  - Reason for the appointment, diagnosis and treatment plan: Yes  - Knowledge of proper use of medications and conditions for which they are ordered (with special attention to potential side effects or drug interactions): Yes  - Which situations necessitate calling provider and whom to contact: Yes    Teaching concerns addressed: signed consents, reviewed and  Updated med list, allergy assessment, smoking assessment, reviewed bmt work up calendar including discussion of all tests and procedures associated with work up ,  ua collection cup given to pt, covid swab sent, frailty assessment completed by pt, labs drawn by RN from [facility], heparin locked,     Proper use and care of (medical equipment, care aids, etc.) Yes  Pain management techniques: NA  Patient instructed on hand hygiene: Yes  How and/when to access community resources: Yes    Infection Control:  Patient and Family demonstrates understanding of the following:  Surgical procedure site care taught NA  Signs and symptoms of infection taught NA  Wound care taught NA  Central venous catheter care taught NA    Instructional Materials Used/Given:   bmt work up calendar, map, RN coordinator binder.  For  Sangeeta/Dina patient wallet card given. Patient instructed to remain within close proximity (at least two hours) for at  least four weeks post infusion.    Research participant Nik Nava was provided the information on the Research Participant Information Sheet by Stephanie Seals RN on July 8, 2021. This document contains information regarding the risks of participating in a research study during the COVID-19 pandemic. Receipt of the information sheet was confirmed by study personnel prior to the visit.  Time spent with patient: 55 minutes.    Specific Concerns: Yes

## 2021-07-08 NOTE — LETTER
7/8/2021         RE: Nik Nava  [id number] [facility] [id number]        Dear Colleague,    Thank you for referring your patient, Nik Nava, to the [facility] BLOOD AND MARROW TRANSPLANT PROGRAM [place]. Please see a copy of my visit note below.        [facility]  BMTCT OPEN VISIT    July 8, 2021        iNk Nava is a 61 year old male undergoing evaluation prior to hematopoietic cell transplant or immune effector cell therapy.    Reason for BMTCT:  Ph positive ALL    Recent chemotherapy:    Vincristine, pred,daunorubicin, peg and IT chemo with methotrexate/cytarabine, last 6/2021   Prednisone 10 mg daily and daily dasatinib 140 mg    Recent infections:   Right lower molar, extracted 3 weeks ago, Levaquin on 6/14/2021 and then amoxicillin after tooth extraction  Rash around port and left side ribs--possible shingles on 6/11, received valtrex, now clear    Blood thinner use? If yes, why? Yes---Will hold for lumbar puncture starting Saturday, 7/10/2021 for LP on Monday    Treatment for diabetes? No          Today, the patient notes the following symptoms:  Review Of Systems  Skin: negative for, rash, itching  Eyes: negative for, eye pain, redness, tearing, itching  Ears/Nose/Throat: negative for, hearing loss, tinnitus  Respiratory: No shortness of breath at rest, has some dyspnea on exertion, no cough, or hemoptysis  Cardiovascular: negative for, palpitations, irregular heart beat and chest pain, No history of MI  Gastrointestinal: negative for, nausea, vomiting and abdominal pain, no blood in stool or black or tarry stools  Genitourinary: negative for, dysuria, frequency and urgency  Musculoskeletal: negative for muscle pain or muscular weakness  Neurologic: negative for headaches  Hematologic/Lymphatic/Immunologic: negative for bleeding disorder  Endocrine: positive for Graves disease now hypothyroid after ablation, negative for  diabetes   Nik Nava's  "History  ALL, PH+, diagnosed in 2021  Grave's disease, irradiated his thyroid, hypothyroid  Renal cell carcinoma   Bilateral PE in April 3, 2021  Pancreatitis, 2021, chemo induced    Surgeries:  Back surgery, low back, disc repair  Shoulder surgery, left, \"cleaned up\"  Shoulder, right tear in labrum  Right knee, meniscal repair  Renal cell carcinoma, right kidney removal,   Appendix removal when in 30's    Family History:   lung cancer in his mother but no other family history of cancer or blood disorders.  Father had polycythemia vera  Grandmother,maternal, had MI   He has 4 healthy siblings and 3 healthy children in their late 30s or early 40s    Social History     Tobacco Use     Smoking status: Former Smoker     Packs/day: 2.00     Years: 30.00     Pack years: 60.00     Quit date: 2019     Years since quittin.1     Smokeless tobacco: Never Used   Substance Use Topics     Alcohol use: Not Currently     Live here in [place], [place].  . Office work in small company.      Nik Perez Medications and Allergies    Current Outpatient Medications   Medication     acetaminophen (TYLENOL) 325 MG tablet     dasatinib (SPRYCEL) 140 MG tablet     levothyroxine (SYNTHROID/LEVOTHROID) 200 MCG tablet     LORazepam (ATIVAN) 1 MG tablet     Metaxalone 400 MG TABS     nicotine polacrilex (NICORETTE) 4 MG gum     omeprazole (PRILOSEC) 40 MG DR capsule     predniSONE (DELTASONE) 10 MG tablet     XARELTO ANTICOAGULANT 20 MG TABS tablet     No current facility-administered medications for this visit.      Facility-Administered Medications Ordered in Other Visits   Medication     heparin 100 UNIT/ML injection 5 mL          Allergies   Allergen Reactions     Sulfamethoxazole W/Trimethoprim Rash           Physical Examination    Vital Signs 2021   Systolic 136   Diastolic 61   Pulse 85   Temperature 97.5   Respirations 18   Weight (LB) 236 lb   Height 6' 1\"   BMI (Calculated) 31.14   Pain    O2 " 98       Exam:  Constitutional: healthy, alert and no distress  Head: Normocephalic. No masses, lesions, tenderness or abnormalities  ENT: ENT exam normal, no neck nodes or sinus tenderness  Cardiovascular: negative, PMI normal. No lifts, heaves, or thrills. RRR. No murmurs, clicks gallops or rub  Respiratory: CTA bilaterally, Percussion normal. Good diaphragmatic excursion.   Gastrointestinal: Abdomen soft, non-tender. BS normal. No masses, organomegaly  : Deferred  Musculoskeletal: extremities normal- no gross deformities noted, gait normal and normal muscle tone  Skin: no suspicious lesions or rashes on visible skin  Neurologic: Gait normal. Non focal Sensation grossly WNL.  Psychiatric: mentation appears normal and affect normal/bright  Hematologic/Lymphatic/Immunologic: Normal cervical lymph nodes        Frailty Screening    1. Weight loss: Have you lost >10 pounds (or >5% body weight) unintentionally over the last year? No      2. Exhaustion: How often in the past week did you feel that:  o I feel that everything I do is an effort : .Exhaustion: 2 = a moderate amount of the time (3-4 days = frailty)  o I feel I cannot get going: Exhaustion: 2 = a moderate amount of the time (3-4 days = frailty)    3. Weakness:  Hand  strength (measured by MA; calculate average): 40.3     Male BMI Frailty Criteria for  Male  Strength Female BMI Frailty Criteria for  Female  Strength   ?24 ?29 ?23 ?17   24.1 - 26 ?30 23.1 - 26 ?17.3   26.1 - 28 ?30 26.1 - 29 ?18   >28 ?32 >29 ?21     4. Slowness:  15 foot walk time (measured by MA):      Height Frailty Criteria for  15 Foot Walk Time   Men   ?173 cm ? 7 seconds    >173 cm  ? 6 seconds   Women   ?159 cm ? 7 seconds   >159 cm  ? 6 seconds     5. Physical activity:     *Please complete 2 calculations for kcal (see frailty worksheet for equations)     Energy expenditure for frailty: 504 kcal expended per week     Gender Frailty Criteria for Low Physical Activity   Male  <383 kcal/week   Female <270 kcal/weel     IPAQ score: 285 MET minutes per week       Nik Nava met the following criteria for prefrailty (score 1-2) or frailty (score 3+): Frailty: Exhaustion and Slowness  Frailty Score is: 2      Additional assessments not to be used in frailty calculation:   What types of physical activity can you tolerate? Walking    Sit to stand test (time to complete 5 reps): 19 seconds    Standing balance in 10 seconds:  Record the Total number of seconds(0-30)--add a+b+c ( take best attempt for each)    First attempt: 30 seconds    Second attempt: no                I have reviewed the diagnostic data, medications, frailty screening, and general processes prior to BMTCT.  I have notified the Primary BMT Physician/and or Attending Physician in the clinic of any issues. We also discussed in detail the database and biorepository research for which Nik Nava is eligible. We discussed the potential risks and potential benefits of each of these protocols individually. We explained potential alternatives to the protocols discussed. We explained to the patient that participation is voluntary and that consent may be withdrawn at any time.       Consents Signed:    Blood transfusion consent form    Ethnicity form    [facility] database    [facility] BMTCT Database    Present during the discussion was Nik Nava and Lamar Elijah. Copies of the signed consent forms will be provided to the patient on admission. No procedures specific to any studies were performed prior to the patient signing the consent form.    Nik Nava had the opportunity to ask questions, and I answered all of the questions to the best of my ability.      Lorrie Yap PA-C  40 minutes spent on the date of the encounter doing chart review, review of test results, interpretation of tests, patient visit and documentation .  Including calculations and entering data.        Will hold for lumbar puncture starting Saturday,  7/10/2021 for LP on Monday  Not sure when he should restart because will have to hold for a line placement. Will contact Patito Herr and have her contact Nik.    --Patito communicated back with me and admission and line placement will be at the earliest week of 7/20 so restart blood thinner the day after LP      Lorrie Yap PA-C  [id number]         Again, thank you for allowing me to participate in the care of your patient.        Sincerely,        BMT Advanced Practice Provider

## 2021-07-08 NOTE — PROGRESS NOTES
"[facility]  BMTCT OPEN VISIT    July 8, 2021        Nik Nava is a 61 year old male undergoing evaluation prior to hematopoietic cell transplant or immune effector cell therapy.    Reason for BMTCT:  Ph positive ALL    Recent chemotherapy:    Vincristine, pred,daunorubicin, peg and IT chemo with methotrexate/cytarabine, last 6/2021   Prednisone 10 mg daily and daily dasatinib 140 mg    Recent infections:   Right lower molar, extracted 3 weeks ago, Levaquin on 6/14/2021 and then amoxicillin after tooth extraction  Rash around port and left side ribs--possible shingles on 6/11, received valtrex, now clear    Blood thinner use? If yes, why? Yes---Will hold for lumbar puncture starting Saturday, 7/10/2021 for LP on Monday    Treatment for diabetes? No          Today, the patient notes the following symptoms:  Review Of Systems  Skin: negative for, rash, itching  Eyes: negative for, eye pain, redness, tearing, itching  Ears/Nose/Throat: negative for, hearing loss, tinnitus  Respiratory: No shortness of breath at rest, has some dyspnea on exertion, no cough, or hemoptysis  Cardiovascular: negative for, palpitations, irregular heart beat and chest pain, No history of MI  Gastrointestinal: negative for, nausea, vomiting and abdominal pain, no blood in stool or black or tarry stools  Genitourinary: negative for, dysuria, frequency and urgency  Musculoskeletal: negative for muscle pain or muscular weakness  Neurologic: negative for headaches  Hematologic/Lymphatic/Immunologic: negative for bleeding disorder  Endocrine: positive for Graves disease now hypothyroid after ablation, negative for  diabetes   Nik Nava's History  ALL, PH+, diagnosed in 1/12/2021  Grave's disease, irradiated his thyroid, hypothyroid  Renal cell carcinoma 2007  Bilateral PE in April 3, 2021  Pancreatitis, March 20, 2021, chemo induced    Surgeries:  Back surgery, low back, disc repair  Shoulder surgery, left, \"cleaned " "up\"  Shoulder, right tear in labrum  Right knee, meniscal repair  Renal cell carcinoma, right kidney removal,   Appendix removal when in 30's    Family History:   lung cancer in his mother but no other family history of cancer or blood disorders.  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      Nik Nava's Medications and Allergies    Current Outpatient Medications   Medication     acetaminophen (TYLENOL) 325 MG tablet     dasatinib (SPRYCEL) 140 MG tablet     levothyroxine (SYNTHROID/LEVOTHROID) 200 MCG tablet     LORazepam (ATIVAN) 1 MG tablet     Metaxalone 400 MG TABS     nicotine polacrilex (NICORETTE) 4 MG gum     omeprazole (PRILOSEC) 40 MG DR capsule     predniSONE (DELTASONE) 10 MG tablet     XARELTO ANTICOAGULANT 20 MG TABS tablet     No current facility-administered medications for this visit.      Facility-Administered Medications Ordered in Other Visits   Medication     heparin 100 UNIT/ML injection 5 mL          Allergies   Allergen Reactions     Sulfamethoxazole W/Trimethoprim Rash           Physical Examination    Vital Signs 2021   Systolic 136   Diastolic 61   Pulse 85   Temperature 97.5   Respirations 18   Weight (LB) 236 lb   Height 6' 1\"   BMI (Calculated) 31.14   Pain    O2 98       Exam:  Constitutional: healthy, alert and no distress  Head: Normocephalic. No masses, lesions, tenderness or abnormalities  ENT: ENT exam normal, no neck nodes or sinus tenderness  Cardiovascular: negative, PMI normal. No lifts, heaves, or thrills. RRR. No murmurs, clicks " gallops or rub  Respiratory: CTA bilaterally, Percussion normal. Good diaphragmatic excursion.   Gastrointestinal: Abdomen soft, non-tender. BS normal. No masses, organomegaly  : Deferred  Musculoskeletal: extremities normal- no gross deformities noted, gait normal and normal muscle tone  Skin: no suspicious lesions or rashes on visible skin  Neurologic: Gait normal. Non focal Sensation grossly WNL.  Psychiatric: mentation appears normal and affect normal/bright  Hematologic/Lymphatic/Immunologic: Normal cervical lymph nodes        Frailty Screening    1. Weight loss: Have you lost >10 pounds (or >5% body weight) unintentionally over the last year? No      2. Exhaustion: How often in the past week did you feel that:  o I feel that everything I do is an effort : .Exhaustion: 2 = a moderate amount of the time (3-4 days = frailty)  o I feel I cannot get going: Exhaustion: 2 = a moderate amount of the time (3-4 days = frailty)    3. Weakness:  Hand  strength (measured by MA; calculate average): 40.3     Male BMI Frailty Criteria for  Male  Strength Female BMI Frailty Criteria for  Female  Strength   ?24 ?29 ?23 ?17   24.1 - 26 ?30 23.1 - 26 ?17.3   26.1 - 28 ?30 26.1 - 29 ?18   >28 ?32 >29 ?21     4. Slowness:  15 foot walk time (measured by MA):      Height Frailty Criteria for  15 Foot Walk Time   Men   ?173 cm ? 7 seconds    >173 cm  ? 6 seconds   Women   ?159 cm ? 7 seconds   >159 cm  ? 6 seconds     5. Physical activity:     *Please complete 2 calculations for kcal (see frailty worksheet for equations)     Energy expenditure for frailty: 504 kcal expended per week     Gender Frailty Criteria for Low Physical Activity   Male <383 kcal/week   Female <270 kcal/weel     IPAQ score: 285 MET minutes per week       Nik Nava met the following criteria for prefrailty (score 1-2) or frailty (score 3+): Frailty: Exhaustion and Slowness  Frailty Score is: 2      Additional assessments not to be used in  frailty calculation:   What types of physical activity can you tolerate? Walking    Sit to stand test (time to complete 5 reps): 19 seconds    Standing balance in 10 seconds:  Record the Total number of seconds(0-30)--add a+b+c ( take best attempt for each)    First attempt: 30 seconds    Second attempt: no                I have reviewed the diagnostic data, medications, frailty screening, and general processes prior to BMTCT.  I have notified the Primary BMT Physician/and or Attending Physician in the clinic of any issues. We also discussed in detail the database and biorepository research for which Nik Nava is eligible. We discussed the potential risks and potential benefits of each of these protocols individually. We explained potential alternatives to the protocols discussed. We explained to the patient that participation is voluntary and that consent may be withdrawn at any time.       Consents Signed:    Blood transfusion consent form    Ethnicity form    [facility] database    [facility] BMTCT Database    Present during the discussion was Nik Nava and Lamar Nava. Copies of the signed consent forms will be provided to the patient on admission. No procedures specific to any studies were performed prior to the patient signing the consent form.    Nik MCDONALD [place]ocolt had the opportunity to ask questions, and I answered all of the questions to the best of my ability.      Lorrie Yap PA-C  40 minutes spent on the date of the encounter doing chart review, review of test results, interpretation of tests, patient visit and documentation .  Including calculations and entering data.

## 2021-07-09 ENCOUNTER — ALLIED HEALTH/NURSE VISIT (OUTPATIENT)
Dept: TRANSPLANT | Facility: CLINIC | Age: 61
End: 2021-07-09
Attending: INTERNAL MEDICINE
Payer: COMMERCIAL

## 2021-07-09 DIAGNOSIS — Z01.818 PREOP EXAMINATION: ICD-10-CM

## 2021-07-09 DIAGNOSIS — Z11.59 SPECIAL SCREENING EXAMINATION FOR VIRAL DISEASE: ICD-10-CM

## 2021-07-09 DIAGNOSIS — Z71.9 VISIT FOR COUNSELING: Primary | ICD-10-CM

## 2021-07-09 DIAGNOSIS — C91.01 ACUTE LYMPHOBLASTIC LEUKEMIA (ALL) IN REMISSION (H): ICD-10-CM

## 2021-07-09 LAB
CMV IGG SERPL QL IA: >8 AI (ref 0–0.8)
EBV VCA IGG SER QL IA: 7.5 AI (ref 0–0.8)
HSV1 IGG SERPL QL IA: >8 AI (ref 0–0.8)
HSV2 IGG SERPL QL IA: <0.2 AI (ref 0–0.8)
HTLV I+II AB PATRN SER IB-IMP: NEGATIVE
INTERPRETATION ECG - MUSE: NORMAL

## 2021-07-09 ASSESSMENT — ANXIETY QUESTIONNAIRES
2. NOT BEING ABLE TO STOP OR CONTROL WORRYING: NOT AT ALL
1. FEELING NERVOUS, ANXIOUS, OR ON EDGE: SEVERAL DAYS
5. BEING SO RESTLESS THAT IT IS HARD TO SIT STILL: NOT AT ALL
6. BECOMING EASILY ANNOYED OR IRRITABLE: SEVERAL DAYS
7. FEELING AFRAID AS IF SOMETHING AWFUL MIGHT HAPPEN: SEVERAL DAYS
3. WORRYING TOO MUCH ABOUT DIFFERENT THINGS: SEVERAL DAYS
GAD7 TOTAL SCORE: 4

## 2021-07-09 ASSESSMENT — PATIENT HEALTH QUESTIONNAIRE - PHQ9
SUM OF ALL RESPONSES TO PHQ QUESTIONS 1-9: 5
5. POOR APPETITE OR OVEREATING: NOT AT ALL

## 2021-07-09 NOTE — PROGRESS NOTES
Will hold for lumbar puncture starting Saturday, 7/10/2021 for LP on Monday  Not sure when he should restart because will have to hold for a line placement. Will contact Patito Herr and have her contact Nik.    --Patito communicated back with me and admission and line placement will be at the earliest week of 7/20 so restart blood thinner the day after LP      Lorrie Yap PA-C  [id number]

## 2021-07-09 NOTE — PROGRESS NOTES
Blood and Marrow Transplant   Psychosocial Assessment with   Clinical     Assessment completed on 7/9/21 via phone as part of the COVID 19 protocol. Assessment of living situation, support system, financial status, functional status, coping, stressors, need for resources and social work intervention provided as needed. Information for this assessment was provided by pt and pt's spouse's report in addition to medical chart review and consultation with medical team.     Present at Assessment:   Patient: Nik Nava  Spouse: Lamar Nava  : MATEO Garcia LGSW    Diagnosis: Acute Lymphoblastic Leukemia (ALL)     Date of Diagnosis: 1/2021    Transplant type: Allogeneic    Donor: Related allogeneic donor stem cell transplant  Donor: Jenise Dasilva      Physician: Chapo Hill MD    Nurse Coordinator: Patito Herr RN    Social Workers: MATEO Garcia LGSW    Permanent/Local Address:   [address] [place] [address]    Living Situation: Pt lives in a house w/ his spouse Lamar.     Local Address: Pt lives in [place], [place] (28 miles; 32 min from [facility]) which is within the required distance of the hospital. Pt does not need to relocate.     Contact Information:  Cell Phone: [phone]  Home Phone: [phone]  Wife Cell Phone: [phone]  Pt Email: [email or web address]    Presenting Information:  Nik Nava is a 61 year old male diagnosed with ALL who presents for evaluation for an allogeneic transplant at the [facility] ([facility]). Pt was accompanied to today's visit by his wife Lamar.     Decision Making: Self    Health Care Directive:   Yes. Pt has a health care directive and will bring it when they return to the BMT program.     Relationship Status: . Pt and pt's spouse have been  for 43 years. Pt described relationship as stable/supportive.     Special Needs: None identified at this time.     Family/Support System: Pt endorsed a  strong support system including family and close friends who will be available to support pt throughout transplant process.     Family Information:   Spouse: Lamar Nava   Parents:   Siblings: Valentín, Mau, Jenise, & Uma (all live in the [facility] area)  Children: 3 -Son Chapo, Dtr Bouchra Fisher, & Dtr Lisa  *Pt & spouse have 13 grandchildren.    Caregiver: SW discussed with pt and pt's spouse the caregiver role and expectation at length. Pt is agreeable to having a full time caregiver for a minimum of 100 days until cleared by the BMT physician. Pt and pt's spouse confirmed understanding of the caregiver requirement. Pt's primary caregiver will be his wife Lamar with his sister Jenise & dtr Lisa as a secondary or back-up to assist as needed. Caregiver education and resources provided. No caregiver concerns identified.     Caregiver Contact Information:  Lamar Nava (wife) - Cell Phone: [phone]    Transportation Mode: Personal Vehicle. Pt is aware of driving restrictions post-BMT and the need for the caregiver is to drive until cleared to drive by the BMT physician. SW provided information on parking info and monthly parking pass options.     Insurance: Pt has [employer] health insurance. Pt denied specific insurance concerns at this time. SW reiterated information about the [employer]  should specific insurance questions arise as pt moves through transplant process. Future Insurance questions referred to [employer] - Yamilet Hall -  # [phone].    Sources of Income: Pt is supported by [employer] benefits & spouse's employment income. Pt applied for SSDI on 21 and still waiting for results. Pt denied anticipation of immediate financial hardship related to BMT at this time. However, he does feel express concerns with decrease in income and various non-medical expenses continue at this time. SW provided information on aziza options (discussed [employer] Co-Pay, & [employer]  "Fund) and encouraged pt to contact this SW for support should financial situation change or this wish to apply.     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 Pt is currently taking medication (Lorazapam in the evenings along w/ melatonin) and pt feels the medication is working well. Pt endorses a history of panic attacks (last one approximately 3 years ago). Pt describes anxiety for him looks like struggling with sleep and intrusive anxious thoughts. When experiencing panic attacks Pt notes he experiences chest pain and inability to sleep. Pt identifies as an \"internal processor\" and walking is helpful for him. SW explained that it's not uncommon for patients going through transplant to experience symptoms of depression/anxiety. Pt believes he would be able to identify symptoms of his depression/anxiety throughout the transplant process.     PHQ-9:  Pt scored a 5 which indicates \"mild\" on the depression severity scale. Pt endorses this is an accurate reflection of his emotional state.    GAD7:  Pt scored a 4 which indicates \"mild\" signs of anxiety on the Generalized Anxiety Disorder Questionnaire. Pt endorses this is an accurate reflection of his emotional state.    Chemical Use:   Tobacco: No current use; quit 2 years ago, smoked 40 years, and Pt utilizes nicotine gum. Denies concern continuing to abstain.   Alcohol: No current use; prior to diagnosis Pt reports avg 6-10 beers/wk. Denies concern continuing to abstain.   Marijuana: No hx  Other Drugs: No hx  Based on the information provided, there appear to be no specific risks or concerns identified at " "this time.     Trauma/Loss/Abuse History: Multiple losses associated with cancer diagnosis and treatment, including health, employment, changes to physical appearance, etc.     Spirituality: Pt identified as Bahai. SW explained that there are Chaplains on the unit and pt can request to meet with a  at anytime. Pt is interested in a blessing ceremony.    Coping: Pt noted that he is currently feeling \"a little anxious\". Pt shared that his main coping mechanisms are positive self-talk, walking, chewing nicotine gum, prayer/spiritual practices, reading, exercise, and taking naps. Pt identifies as an \"internal processor.\" Pt indicates he \"comes from a family of worriers.\" Pt noted that he enjoys yardwork, gardening, cars, and going to his cabin. SW and pt discussed additional positive coping mechanisms that pt can utilize while in the hospital. While hospitalized, pt plans to bring his IPAD, listen to music, crossword books, and read magazines.     Caregiver Coping: Pt's wife noted that she is feeling \"a little overwhelmed\" following the RNCC education. Pt's wife noted that she mireya by talking with her family/friends, prayer/spiritual practices, thinking positively, and focusing on what she can control. She endorses that it is helpful for her to try to focus on the present rather than worry about the next chapter in Pt's treatment.     Education Provided: Transplant process expectations, Caregiver requirements, Caregiver self-care, Financial issues related to transplant, Financial resources/grants available, Common psychosocial stressors pre/post transplant, Support group(s) available, Hospital resources available, Web site information, Social Work role and Resources for children/siblings  Provided:  -[employer] Application  -[employer] Application  -S Co-Pay Assistance Information  -[employer] Peer Connect  -[employer] Counseling Services  -Cancer Legal Care  -Cancer Care Support " Groups  -Current Visitor Policy Handout    Interventions Provided: Psychosocial Support and Education     Assessment and Recommendations for Team:  Pt is a 61 year old male diagnosed with ALL who is here undergoing preparation for a planned allogeneic transplant.     Pt is pleasant, calm and able to articulate concerns/coping mechanisms in an appropriate manner. Pt utilized humor throughout CSW visit and communicated thoughtful insight to his psychosocial needs. During our meeting pt was alert, he was interactive, affect was full, he displayed appropriate memory and thought processes. Pt feels comfortable communicating with the medical team. Pt has a strong supportive network of family and friends who are involved. Pt has developed strong coping mechanisms.     Pt has a strong support system and a confirmed caregiver plan. Pt verbalizes understanding of the transplant process and wanting to proceed. SW provided contact information and encouraged pt to contact SW with questions, concerns, resources and for support.    Per this assessment, SW did not identify any barriers to this patient moving forward with transplant.    Important Information:   - would like treadmill while IP.    - would like to bring his own brand of nicotine gum while IP and will discuss with nursing upon admission.    -hx of anxiety/panic attacks - nicotine gum and walking is helpful for Pt.     Follow up Planned:   Psychosocial support  Healthcare Directive    MATEO Garcia, [facility]  Adult Blood & Marrow Transplant   Phone: [phone]  Pager: [phone]

## 2021-07-10 LAB — COPATH REPORT: NORMAL

## 2021-07-10 ASSESSMENT — ANXIETY QUESTIONNAIRES: GAD7 TOTAL SCORE: 4

## 2021-07-11 LAB
HBV DNA SERPL QL NAA+PROBE: NORMAL
HCV RNA SERPL QL NAA+PROBE: NORMAL
HIV1+2 RNA SERPL QL NAA+PROBE: NORMAL
TRYPANOSOMA CRUZI: NORMAL
WNV RNA SERPL DONR QL NAA+PROBE: NORMAL

## 2021-07-12 ENCOUNTER — HOSPITAL ENCOUNTER (OUTPATIENT)
Dept: CT IMAGING | Facility: CLINIC | Age: 61
End: 2021-07-12
Attending: INTERNAL MEDICINE
Payer: COMMERCIAL

## 2021-07-12 ENCOUNTER — ALLIED HEALTH/NURSE VISIT (OUTPATIENT)
Dept: TRANSPLANT | Facility: CLINIC | Age: 61
End: 2021-07-12
Attending: NURSE PRACTITIONER
Payer: COMMERCIAL

## 2021-07-12 ENCOUNTER — TELEPHONE (OUTPATIENT)
Dept: GENERAL RADIOLOGY | Facility: CLINIC | Age: 61
End: 2021-07-12

## 2021-07-12 ENCOUNTER — HOSPITAL ENCOUNTER (OUTPATIENT)
Dept: CARDIOLOGY | Facility: CLINIC | Age: 61
End: 2021-07-12
Attending: INTERNAL MEDICINE
Payer: COMMERCIAL

## 2021-07-12 VITALS
DIASTOLIC BLOOD PRESSURE: 81 MMHG | HEART RATE: 83 BPM | OXYGEN SATURATION: 97 % | WEIGHT: 238 LBS | SYSTOLIC BLOOD PRESSURE: 140 MMHG | TEMPERATURE: 98.4 F | BODY MASS INDEX: 31.4 KG/M2

## 2021-07-12 DIAGNOSIS — C91.01 ACUTE LYMPHOBLASTIC LEUKEMIA (ALL) IN REMISSION (H): ICD-10-CM

## 2021-07-12 DIAGNOSIS — Z01.818 PREOP EXAMINATION: ICD-10-CM

## 2021-07-12 DIAGNOSIS — Z11.59 SPECIAL SCREENING EXAMINATION FOR VIRAL DISEASE: ICD-10-CM

## 2021-07-12 DIAGNOSIS — C91.01 ACUTE LYMPHOBLASTIC LEUKEMIA (ALL) IN REMISSION (H): Primary | ICD-10-CM

## 2021-07-12 LAB
3D LVEF ECHO: NORMAL
DLCOCOR-%PRED-PRE: 141 %
DLCOCOR-PRE: 41.87 ML/MIN/MMHG
DLCOUNC-%PRED-PRE: 108 %
DLCOUNC-PRE: 32.32 ML/MIN/MMHG
DLCOUNC-PRED: 29.67 ML/MIN/MMHG
ERV-%PRED-PRE: 192 %
ERV-PRE: 2.22 L
ERV-PRED: 1.16 L
EXPTIME-PRE: 8.1 SEC
FEF2575-%PRED-PRE: 86 %
FEF2575-PRE: 2.61 L/SEC
FEF2575-PRED: 3.03 L/SEC
FEFMAX-%PRED-PRE: 114 %
FEFMAX-PRE: 11.1 L/SEC
FEFMAX-PRED: 9.68 L/SEC
FEV1-%PRED-PRE: 98 %
FEV1-PRE: 3.75 L
FEV1FEV6-PRE: 76 %
FEV1FEV6-PRED: 79 %
FEV1FVC-PRE: 74 %
FEV1FVC-PRED: 77 %
FEV1SVC-PRE: 72 %
FEV1SVC-PRED: 70 %
FIFMAX-PRE: 5.7 L/SEC
FRCPLETH-%PRED-PRE: 114 %
FRCPLETH-PRE: 4.38 L
FRCPLETH-PRED: 3.82 L
FVC-%PRED-PRE: 101 %
FVC-PRE: 5.07 L
FVC-PRED: 5.01 L
IC-%PRED-PRE: 69 %
IC-PRE: 2.97 L
IC-PRED: 4.29 L
RVPLETH-%PRED-PRE: 83 %
RVPLETH-PRE: 2.15 L
RVPLETH-PRED: 2.59 L
SARS-COV-2 RNA RESP QL NAA+PROBE: NEGATIVE
TLCPLETH-%PRED-PRE: 94 %
TLCPLETH-PRE: 7.34 L
TLCPLETH-PRED: 7.74 L
VA-%PRED-PRE: 107 %
VA-PRE: 7.74 L
VC-%PRED-PRE: 95 %
VC-PRE: 5.19 L
VC-PRED: 5.45 L

## 2021-07-12 PROCEDURE — U0003 INFECTIOUS AGENT DETECTION BY NUCLEIC ACID (DNA OR RNA); SEVERE ACUTE RESPIRATORY SYNDROME CORONAVIRUS 2 (SARS-COV-2) (CORONAVIRUS DISEASE [COVID-19]), AMPLIFIED PROBE TECHNIQUE, MAKING USE OF HIGH THROUGHPUT TECHNOLOGIES AS DESCRIBED BY CMS-2020-01-R: HCPCS

## 2021-07-12 PROCEDURE — 93306 TTE W/DOPPLER COMPLETE: CPT

## 2021-07-12 PROCEDURE — 76376 3D RENDER W/INTRP POSTPROCES: CPT

## 2021-07-12 PROCEDURE — 71250 CT THORAX DX C-: CPT | Mod: 26 | Performed by: RADIOLOGY

## 2021-07-12 PROCEDURE — 94726 PLETHYSMOGRAPHY LUNG VOLUMES: CPT | Performed by: INTERNAL MEDICINE

## 2021-07-12 PROCEDURE — G0463 HOSPITAL OUTPT CLINIC VISIT: HCPCS | Mod: 25

## 2021-07-12 PROCEDURE — 71250 CT THORAX DX C-: CPT

## 2021-07-12 PROCEDURE — 94729 DIFFUSING CAPACITY: CPT | Performed by: INTERNAL MEDICINE

## 2021-07-12 PROCEDURE — 94375 RESPIRATORY FLOW VOLUME LOOP: CPT | Performed by: INTERNAL MEDICINE

## 2021-07-12 PROCEDURE — 93306 TTE W/DOPPLER COMPLETE: CPT | Mod: 26 | Performed by: STUDENT IN AN ORGANIZED HEALTH CARE EDUCATION/TRAINING PROGRAM

## 2021-07-12 ASSESSMENT — PAIN SCALES - GENERAL: PAINLEVEL: NO PAIN (0)

## 2021-07-12 NOTE — LETTER
7/12/2021         RE: Nik Nava  [id number] [address] [facility] [place] [address]        Dear Colleague,    Thank you for referring your patient, Nik Nava, to the [facility] BLOOD AND MARROW TRANSPLANT PROGRAM [place]. Please see a copy of my visit note below.    Scheduled for LP.  Pt has had multiple LP's all under fluroscopy & has had surgery at L4.  Attempted LP, unable to get in.  Will reschedule under xray      Again, thank you for allowing me to participate in the care of your patient.        Sincerely,        BMT Advanced Practice Provider

## 2021-07-12 NOTE — PROGRESS NOTES
Scheduled for LP.  Pt has had multiple LP's all under fluroscopy & has had surgery at L4.  Attempted LP, unable to get in.  Will reschedule under xray

## 2021-07-12 NOTE — TELEPHONE ENCOUNTER
Called with instructions regarding lumbar puncture Wednesday, 7/14, including need to hold Xarelto. My Chart instructions also sent.  
negative...

## 2021-07-12 NOTE — NURSING NOTE
"Oncology Rooming Note    July 12, 2021 12:33 PM   Nik Nava is a 61 year old male who presents for:    No chief complaint on file.    Initial Vitals: BP (!) 140/81   Pulse 83   Temp 98.4  F (36.9  C) (Oral)   Wt 108 kg (238 lb)   SpO2 97%   BMI 31.40 kg/m   Estimated body mass index is 31.4 kg/m  as calculated from the following:    Height as of 7/8/21: 1.854 m (6' 1\").    Weight as of this encounter: 108 kg (238 lb). Body surface area is 2.36 meters squared.  No Pain (0) Comment: Data Unavailable   No LMP for male patient.  Allergies reviewed: Yes  Medications reviewed: Yes    Medications: Medication refills not needed today.  Pharmacy name entered into [employer]: [facility] - [place], [place] - [address] [address]    Clinical concerns: none       Yamilet Marley CMA              "

## 2021-07-13 ENCOUNTER — VIRTUAL VISIT (OUTPATIENT)
Dept: TRANSPLANT | Facility: CLINIC | Age: 61
End: 2021-07-13
Attending: INTERNAL MEDICINE
Payer: COMMERCIAL

## 2021-07-13 ENCOUNTER — OFFICE VISIT (OUTPATIENT)
Dept: RADIATION ONCOLOGY | Facility: CLINIC | Age: 61
End: 2021-07-13
Attending: RADIOLOGY
Payer: COMMERCIAL

## 2021-07-13 VITALS — WEIGHT: 238 LBS | BODY MASS INDEX: 31.4 KG/M2

## 2021-07-13 DIAGNOSIS — Z01.818 PREOP EXAMINATION: ICD-10-CM

## 2021-07-13 DIAGNOSIS — C91.01 ACUTE LYMPHOBLASTIC LEUKEMIA (ALL) IN REMISSION (H): Primary | ICD-10-CM

## 2021-07-13 DIAGNOSIS — Z11.59 SPECIAL SCREENING EXAMINATION FOR VIRAL DISEASE: ICD-10-CM

## 2021-07-13 DIAGNOSIS — C91.01 ACUTE LYMPHOBLASTIC LEUKEMIA (ALL) IN REMISSION (H): ICD-10-CM

## 2021-07-13 PROCEDURE — 77290 THER RAD SIMULAJ FIELD CPLX: CPT | Mod: 26 | Performed by: RADIOLOGY

## 2021-07-13 PROCEDURE — 77470 SPECIAL RADIATION TREATMENT: CPT | Performed by: RADIOLOGY

## 2021-07-13 PROCEDURE — 97802 MEDICAL NUTRITION INDIV IN: CPT | Mod: TEL

## 2021-07-13 PROCEDURE — 77290 THER RAD SIMULAJ FIELD CPLX: CPT | Performed by: NURSE PRACTITIONER

## 2021-07-13 PROCEDURE — 77263 THER RADIOLOGY TX PLNG CPLX: CPT | Performed by: RADIOLOGY

## 2021-07-13 PROCEDURE — 77470 SPECIAL RADIATION TREATMENT: CPT | Mod: 26 | Performed by: RADIOLOGY

## 2021-07-13 NOTE — LETTER
"    7/13/2021         RE: Nik Nava  [address] [address] [facility] [address]        Dear Colleague,    Thank you for referring your patient, Nik Nava, to the [facility] BLOOD AND MARROW TRANSPLANT PROGRAM [place]. Please see a copy of my visit note below.    Nik is a 61 year old who is being evaluated via a billable telephone visit.      CLINICAL NUTRITION SERVICES    REASON FOR ASSESSMENT  Nik Nava is a/an 61 year old male assessed by the dietitian for a nutrition consult for education prior to a bone marrow transplant.   Referring Provider: Chapo Hill MD .    PMH significant for: ALL, hypothyroidism, renal cancer in 2007 and GERD plus chronic renal insufficiency from his post nephrectomy state with a creatinine ranging from 1.3-1.5 over the last 10 years    NUTRITION HISTORY  Information obtained from visited with pt and significant other, Lamar, via phone.  Pt has some decreased intakes from dysgeusia but otherwise overall has been eating well with pretty good appetite. Eats meals BID and gravitates to a more meat and potatoes type diet.  Drinks premier protein - 2 per day. No OTC micronutrient/herbal supplements.      LABS   Reviewed  Lytes WNL (7/8)  Euglycemia (7/8)    MEDICATIONS   Reviewed  Furosemide  Ensure high protein  miralax  Prednisone  senna    ANTHROPOMETRICS  Height:   Ht Readings from Last 2 Encounters:   07/08/21 1.854 m (6' 1\")     IBW: 83.6 kg  BMI: 31.4 --  Obesity Grade I BMI 30-34.9  Weight History: 2.8% wt loss over past ~5 months  Wt Readings from Last 15 Encounters:   07/12/21 108 kg (238 lb)   07/08/21 107 kg (236 lb)   02/04/21 111.1 kg (245 lb)     Dosing Weight: 90 kg (adjusted 108kg on 7/12 and IBW of 83.6 kg)    ASSESSED NUTRITION NEEDS  Estimated Energy Needs: 0315-9843 kcals/day (25 - 30 kcals/kg)  Justification: Maintenance  Estimated Protein Needs:  grams protein/day (1 - 1.2 grams of pro/kg)  Justification: Maintenance -- " pending renal function  Estimated Fluid Needs:  (1 mL/kcal)   Justification: Maintenance and Per provider pending fluid status    PHYSICAL FINDINGS  See malnutrition section below.    MALNUTRITION  Malnutrition Diagnosis: Unable to determine due to unable to complete all parameters with phone interview    NUTRITION DIAGNOSIS  Food- and nutrition-related knowledge deficit related to no previous education on nutrition changes associated with a bone marrow transplant as evidenced by MD consult and pt verbalization      INTERVENTIONS  Implementation  Nutrition Education:   Provided education on how to cope with the side effects of potential upcoming bone marrow transplant. Encouraged optimize current healthy state to maintain or regain lost weight prior to transplant. Discussed management of dysgeusia and FASS. Discussed how to fortify meals and snacks with more calories and protein. Reviewed oral nutrition supplements and snack options available for consumption during inpatient stay. Reviewed guidelines for food safety during neutropenia phase and while taking immunosuppression medications. Answered all pt's current questions about nutrition.     Goals  1. Maintain weight >238 Ibs VS no more than 1-2lb wt loss per week throughout SCT course.   2. Verbalized 2-3 ways to maintain nutrition status throughout SCT course.     Monitoring/Evaluation  Progress toward goals will be monitored and evaluated per protocol.    Patient Understanding: Good  Expected Compliance: Good  Follow Up: PRN-pt provided with RD contact information if needs arise.     Phone Duration: 39 minutes    Raquel Stewart MS, RD, , CNSC, LD.  5C/BMT Pager:[phone]          Again, thank you for allowing me to participate in the care of your patient.        Sincerely,        Raquel Stewart RD

## 2021-07-13 NOTE — PROGRESS NOTES
"Nik is a 61 year old who is being evaluated via a billable telephone visit.      CLINICAL NUTRITION SERVICES    REASON FOR ASSESSMENT  Nik Nava is a/an 61 year old male assessed by the dietitian for a nutrition consult for education prior to a bone marrow transplant.   Referring Provider: Chapo Hill MD .    PMH significant for: ALL, hypothyroidism, renal cancer in 2007 and GERD plus chronic renal insufficiency from his post nephrectomy state with a creatinine ranging from 1.3-1.5 over the last 10 years    NUTRITION HISTORY  Information obtained from visited with pt and significant other, Lamar, via phone.  Pt has some decreased intakes from dysgeusia but otherwise overall has been eating well with pretty good appetite. Eats meals BID and gravitates to a more meat and potatoes type diet.  Drinks premier protein - 2 per day. No OTC micronutrient/herbal supplements.      LABS   Reviewed  Lytes WNL (7/8)  Euglycemia (7/8)    MEDICATIONS   Reviewed  Furosemide  Ensure high protein  miralax  Prednisone  senna    ANTHROPOMETRICS  Height:   Ht Readings from Last 2 Encounters:   07/08/21 1.854 m (6' 1\")     IBW: 83.6 kg  BMI: 31.4 --  Obesity Grade I BMI 30-34.9  Weight History: 2.8% wt loss over past ~5 months  Wt Readings from Last 15 Encounters:   07/12/21 108 kg (238 lb)   07/08/21 107 kg (236 lb)   02/04/21 111.1 kg (245 lb)     Dosing Weight: 90 kg (adjusted 108kg on 7/12 and IBW of 83.6 kg)    ASSESSED NUTRITION NEEDS  Estimated Energy Needs: 4563-1869 kcals/day (25 - 30 kcals/kg)  Justification: Maintenance  Estimated Protein Needs:  grams protein/day (1 - 1.2 grams of pro/kg)  Justification: Maintenance -- pending renal function  Estimated Fluid Needs:  (1 mL/kcal)   Justification: Maintenance and Per provider pending fluid status    PHYSICAL FINDINGS  See malnutrition section below.    MALNUTRITION  Malnutrition Diagnosis: Unable to determine due to unable to complete all parameters with " phone interview    NUTRITION DIAGNOSIS  Food- and nutrition-related knowledge deficit related to no previous education on nutrition changes associated with a bone marrow transplant as evidenced by MD consult and pt verbalization      INTERVENTIONS  Implementation  Nutrition Education:   Provided education on how to cope with the side effects of potential upcoming bone marrow transplant. Encouraged optimize current healthy state to maintain or regain lost weight prior to transplant. Discussed management of dysgeusia and FASS. Discussed how to fortify meals and snacks with more calories and protein. Reviewed oral nutrition supplements and snack options available for consumption during inpatient stay. Reviewed guidelines for food safety during neutropenia phase and while taking immunosuppression medications. Answered all pt's current questions about nutrition.     Goals  1. Maintain weight >238 Ibs VS no more than 1-2lb wt loss per week throughout SCT course.   2. Verbalized 2-3 ways to maintain nutrition status throughout SCT course.     Monitoring/Evaluation  Progress toward goals will be monitored and evaluated per protocol.    Patient Understanding: Good  Expected Compliance: Good  Follow Up: PRN-pt provided with RD contact information if needs arise.     Phone Duration: 39 minutes    Raquel Stewart MS, RD, , CNSC, LD.  5C/BMT Pager:[phone]

## 2021-07-13 NOTE — LETTER
7/13/2021         RE: Nik Nava  [address] [facility] [id number]        Dear Colleague,    Thank you for referring your patient, Nik Nava, to the [facility] RADIATION ONCOLOGY. Please see a copy of my visit note below.    Radiation Oncology Consult  Patient comes in for initial consultation in the Radiation Oncology Department at the request of Dr. Hill to determine eligibility for TBI prior to transplant.     History of Present Illness:  Nik Nava is a pleasant 61 year old year old male in no acute distress and is accompanied by his wife, Lamar.  Initially noticed symptoms at the end of December fatigue and chest pain.  On 1/8/2021 white count was 13.6 hemoglobin and platelets were normal but he had 40% peripheral blasts.  Bone marrow biopsy on 1/12/2021 was hypercellular and 80% blasts.  He was +BCR/ABL and monosomy 7.  CSF did have 11% blasts with 1 white blood cell and 3 RBCs at diagnosis.  Remaining CSF have been negative.  He started treatment with dexamethasone and dasatinib bone marrow biopsy on 2/16/2021 showed 3% blasts on 3/4 he started treatment with vincristine, prednisone, daunorubicin, peg-aspar and IT chemotherapy.  Bone marrow on 4/5/2021 was negative. He had another round of chemotherapy and bone marrow biopsy on 5/17/2021 showed 1.2% blasts and MRD at 0.005%.  He then had high-dose cytarabine.  Bone marrow biopsy on 6/28/2021 was negative for ALL and 40% cellular.  Flow and FISH were both negative.  He did have some complications of chemotherapy of pancreatitis and bilateral PEs.  He had a lot of fatigue.  He is feeling better now but does continue to have some fatigue.      His sister will be his donor and he does not have a date.    Previous Radiation:   He did receive radioactive iodine as treatment for Graves' disease in his 20s.    Previous Chemotherapy:  As above per HPI.      Pregnant: Not Applicable  No results found for: HCGQUANT  Implanted  Cardiac Devices: No    Medications:  Current Outpatient Medications   Medication     acetaminophen (TYLENOL) 500 MG tablet     dasatinib (SPRYCEL) 140 MG tablet     furosemide (LASIX) 40 MG tablet     levothyroxine (SYNTHROID/LEVOTHROID) 200 MCG tablet     LORazepam (ATIVAN) 1 MG tablet     magnesium hydroxide (MILK OF MAGNESIA) 400 MG/5ML suspension     melatonin 5 MG CAPS     Metaxalone 400 MG TABS     nicotine polacrilex (NICORETTE) 4 MG gum     Nutritional Supplements (ENSURE HIGH PROTEIN)     omeprazole (PRILOSEC) 40 MG DR capsule     ondansetron (ZOFRAN) 8 MG tablet     oxyCODONE (ROXICODONE) 5 MG tablet     polyethylene glycol (MIRALAX) 17 GM/Dose powder     predniSONE (DELTASONE) 10 MG tablet     prochlorperazine (COMPAZINE) 10 MG tablet     senna (SENOKOT) 8.6 MG tablet     XARELTO ANTICOAGULANT 20 MG TABS tablet     No current facility-administered medications for this visit.       Allergies:     Allergies   Allergen Reactions     Sulfamethoxazole W/Trimethoprim Rash       Past Medical History:  Past Medical History:   Diagnosis Date     Cancer (H)     renal cell     CKD (chronic kidney disease)      Thyroid disease        Past Surgical History:  Past Surgical History:   Procedure Laterality Date     BACK SURGERY  2017       Family History:  family history includes Coronary Artery Disease in his maternal grandmother; Lung Cancer in his mother; Polycythemia in his father.        Social History:  Patient is  and lives in [place].  Has 3 children.    Pain Assessment 0-10:  Patient rates pain at a 0.    Review of Symptoms:  Constitutional: Negative for fever, chills, weight loss and + malaise/fatigue.   Overall patient feels well.  HENT: Negative for nosebleeds, congestion, sore throat and neck pain.   Respiratory: Negative for cough, hemoptysis, sputum production, shortness of breath and wheezing.   Cardiovascular: Negative for chest pain, palpitations, claudication, leg swelling and PND.    Gastrointestinal: Negative for heartburn, nausea, vomiting, abdominal pain, diarrhea, constipation and blood in stool.   Genitourinary: Negative for dysuria.   Musculoskeletal: Negative for myalgias and joint pain.   Skin: Negative for itching and rash.   Neurological: Negative for dizziness, tingling, weakness and headaches.   Endo/Heme/Allergies: Does not bruise/bleed easily.   Psychiatric/Behavioral: Negative for depression and suicidal ideas. The patient is not nervous/anxious.     Physical Exam:  Wt 108 kg (238 lb)   BMI 31.40 kg/m    GENERAL: Well-developed, well-nourished, globally oriented.   NECK: Supple, full range of motion  EXTREMITIES: Without cyanosis, clubbing or edema.  NEUROLOGIC: Alert and oriented.  Gait normal.     Labs:  CBC RESULTS:   Recent Labs   Lab Test 07/08/21  1055   WBC 8.9   RBC 2.53*   HGB 8.5*   HCT 25.9*   *   MCH 33.6*   MCHC 32.8   RDW 19.6*   *       All pertinent labs, scans, and test results have been reviewed.    EDUCATION:  Patient given verbal and written education on TBI side effects, purpose of treatment and what the radiation experience will be like.  Patient expressed understanding and asked appropriate questions.        Assessment/Plan: Ph+ ALL  Patient currently undergoing work-up for sibling donor NMA transplant per protocol 2015-32 which calls for  cGy.    STAFF RADIATION ONCOLOGIST    I saw Nik Nava  who appears to be a suitable candidate for TBI prior to hematopoietic transplant per protocol.  Conditioning for this protocol is nonmyeloablative and includes chemotherapy and total body irradiation given in 1 treatment for a total dose of  200 cGy.     The patient has no radiation history. I recommend 200 cGy to be delivered as total body irradiation (TBI) using photons prescribed to the midplane at the level of the umbillicus. The treatment will consist of a single treatment (fraction) using right and left lateral fields with the patient  in a semi-recumbent position with compensators.      Risks and benefits of TBI were discussed including the potential short term side effects including  nausea, vomiting, mucositis, parotidits, alopecia, and potential organ damage.  Also discussed potential long term side effects including  cataracts, sterility, chronic organ dysfunction, neurological effects, hormone insufficiencies, and secondary malignancies.    The patient and family were given a chance to ask questions and they seem to understand risks and benefits of radiation conditioning prior to transplant.  Informed consent was obtained.  Simulation and measurements were performed under my direction.    If you have any questions or concerns please do not hesitate to contact me. Patient will be followed by BMT staff after transplant.    Genny Leo MD  Department of Radiation Oncology  [facility]      CC  Patient Care Team:  Wojciech Soares as PCP - General (Medical Oncology)  [facility] Hospital[facility]apo holt MD as Assigned Cancer Care Provider  Patito Herr RN as Specialty Care Coordinator  Yamilet Garcia MSW as

## 2021-07-13 NOTE — PROGRESS NOTES
Radiation Oncology Consult  Patient comes in for initial consultation in the Radiation Oncology Department at the request of Dr. Hill to determine eligibility for TBI prior to transplant.     History of Present Illness:  Nik Nava is a pleasant 61 year old year old male in no acute distress and is accompanied by his wife, Lamar.  Initially noticed symptoms at the end of December fatigue and chest pain.  On 1/8/2021 white count was 13.6 hemoglobin and platelets were normal but he had 40% peripheral blasts.  Bone marrow biopsy on 1/12/2021 was hypercellular and 80% blasts.  He was +BCR/ABL and monosomy 7.  CSF did have 11% blasts with 1 white blood cell and 3 RBCs at diagnosis.  Remaining CSF have been negative.  He started treatment with dexamethasone and dasatinib bone marrow biopsy on 2/16/2021 showed 3% blasts on 3/4 he started treatment with vincristine, prednisone, daunorubicin, peg-aspar and IT chemotherapy.  Bone marrow on 4/5/2021 was negative. He had another round of chemotherapy and bone marrow biopsy on 5/17/2021 showed 1.2% blasts and MRD at 0.005%.  He then had high-dose cytarabine.  Bone marrow biopsy on 6/28/2021 was negative for ALL and 40% cellular.  Flow and FISH were both negative.  He did have some complications of chemotherapy of pancreatitis and bilateral PEs.  He had a lot of fatigue.  He is feeling better now but does continue to have some fatigue.      His sister will be his donor and he does not have a date.    Previous Radiation:   He did receive radioactive iodine as treatment for Graves' disease in his 20s.    Previous Chemotherapy:  As above per HPI.      Pregnant: Not Applicable  No results found for: HCGQUANT  Implanted Cardiac Devices: No    Medications:  Current Outpatient Medications   Medication     acetaminophen (TYLENOL) 500 MG tablet     dasatinib (SPRYCEL) 140 MG tablet     furosemide (LASIX) 40 MG tablet     levothyroxine (SYNTHROID/LEVOTHROID) 200 MCG tablet      LORazepam (ATIVAN) 1 MG tablet     magnesium hydroxide (MILK OF MAGNESIA) 400 MG/5ML suspension     melatonin 5 MG CAPS     Metaxalone 400 MG TABS     nicotine polacrilex (NICORETTE) 4 MG gum     Nutritional Supplements (ENSURE HIGH PROTEIN)     omeprazole (PRILOSEC) 40 MG DR capsule     ondansetron (ZOFRAN) 8 MG tablet     oxyCODONE (ROXICODONE) 5 MG tablet     polyethylene glycol (MIRALAX) 17 GM/Dose powder     predniSONE (DELTASONE) 10 MG tablet     prochlorperazine (COMPAZINE) 10 MG tablet     senna (SENOKOT) 8.6 MG tablet     XARELTO ANTICOAGULANT 20 MG TABS tablet     No current facility-administered medications for this visit.       Allergies:     Allergies   Allergen Reactions     Sulfamethoxazole W/Trimethoprim Rash       Past Medical History:  Past Medical History:   Diagnosis Date     Cancer (H)     renal cell     CKD (chronic kidney disease)      Thyroid disease        Past Surgical History:  Past Surgical History:   Procedure Laterality Date     BACK SURGERY  2017       Family History:  family history includes Coronary Artery Disease in his maternal grandmother; Lung Cancer in his mother; Polycythemia in his father.        Social History:  Patient is  and lives in [place].  Has 3 children.    Pain Assessment 0-10:  Patient rates pain at a 0.    Review of Symptoms:  Constitutional: Negative for fever, chills, weight loss and + malaise/fatigue.   Overall patient feels well.  HENT: Negative for nosebleeds, congestion, sore throat and neck pain.   Respiratory: Negative for cough, hemoptysis, sputum production, shortness of breath and wheezing.   Cardiovascular: Negative for chest pain, palpitations, claudication, leg swelling and PND.   Gastrointestinal: Negative for heartburn, nausea, vomiting, abdominal pain, diarrhea, constipation and blood in stool.   Genitourinary: Negative for dysuria.   Musculoskeletal: Negative for myalgias and joint pain.   Skin: Negative for itching and rash.   Neurological:  Negative for dizziness, tingling, weakness and headaches.   Endo/Heme/Allergies: Does not bruise/bleed easily.   Psychiatric/Behavioral: Negative for depression and suicidal ideas. The patient is not nervous/anxious.     Physical Exam:  Wt 108 kg (238 lb)   BMI 31.40 kg/m    GENERAL: Well-developed, well-nourished, globally oriented.   NECK: Supple, full range of motion  EXTREMITIES: Without cyanosis, clubbing or edema.  NEUROLOGIC: Alert and oriented.  Gait normal.     Labs:  CBC RESULTS:   Recent Labs   Lab Test 07/08/21  1055   WBC 8.9   RBC 2.53*   HGB 8.5*   HCT 25.9*   *   MCH 33.6*   MCHC 32.8   RDW 19.6*   *       All pertinent labs, scans, and test results have been reviewed.    EDUCATION:  Patient given verbal and written education on TBI side effects, purpose of treatment and what the radiation experience will be like.  Patient expressed understanding and asked appropriate questions.        Assessment/Plan: Ph+ ALL  Patient currently undergoing work-up for sibling donor NMA transplant per protocol 2015-32 which calls for  cGy.    STAFF RADIATION ONCOLOGIST    I saw Nik Nava  who appears to be a suitable candidate for TBI prior to hematopoietic transplant per protocol.  Conditioning for this protocol is nonmyeloablative and includes chemotherapy and total body irradiation given in 1 treatment for a total dose of  200 cGy.     The patient has no radiation history. I recommend 200 cGy to be delivered as total body irradiation (TBI) using photons prescribed to the midplane at the level of the umbillicus. The treatment will consist of a single treatment (fraction) using right and left lateral fields with the patient in a semi-recumbent position with compensators.      Risks and benefits of TBI were discussed including the potential short term side effects including  nausea, vomiting, mucositis, parotidits, alopecia, and potential organ damage.  Also discussed potential long term side  effects including  cataracts, sterility, chronic organ dysfunction, neurological effects, hormone insufficiencies, and secondary malignancies.    The patient and family were given a chance to ask questions and they seem to understand risks and benefits of radiation conditioning prior to transplant.  Informed consent was obtained.  Simulation and measurements were performed under my direction.    If you have any questions or concerns please do not hesitate to contact me. Patient will be followed by BMT staff after transplant.    Genny Leo MD  Department of Radiation Oncology  [facility]      CC  Patient Care Team:  Wojciech Soares as PCP - General (Medical Oncology)  Wojciech Soares as MD (Medical Oncology)  Chapo Hill MD as Assigned Cancer Care Provider  Patito Herr, RN as Specialty Care Coordinator  Yamilet Garcia MSW as

## 2021-07-13 NOTE — LETTER
"    7/13/2021         RE: Nik Nava  [address] [facility] [address]        Dear Colleague,    Thank you for referring your patient, Nik Nava, to the [facility] BLOOD AND MARROW TRANSPLANT PROGRAM [place]. Please see a copy of my visit note below.    Pharmacy Assessment - Pre-Stem Cell Transplant    Assessments & Recommendations:  1) Patient currently taking Dasatinib, please notify when to stop prior to transplant.  2) Patient on Xarelto for bilateral PE (4/2021). Recommend to hold for platelets <50k. For platelets 25-50k, suggest to use Enoxaparin at half dose.  3) Patient with solitary kidney (s/p nephrectomy for RCC). Avoid nephrotoxins as able, monitor Scr closely if use is necessary.  4) Patient with allergy to Bactrim, has been on Pentamidine inh in past for PJP prophylaxis.    If this patient is admitted under observation, the patient may bring in their own supply of the following medication for use in the hospital:  1) Nicotine gum- pt uses specific brand  2) Dasatinib if not being held.  -Per \"Medications Not Supplied by Pharmacy\" policy (available on [employer])    History of Present Illness:  Nik Nava is a 61 year old year old male diagnosed with ALL.  he has been treated with Vincristine/Prednisone/Daunorubicin/Pegaspargase with IT chemotherapy , and currently on Dasatinib.  he is now being work up for VER[facility]NICA allo-sib Stem Cell Transplant on protocol 2015-32, which utilizes Flu/Cy/TBI as a conditioning regimen.    Pertinent labs/tests:  Viral Testing:  CMV(+) / HSV(+) / EBV(+)   Ejection Fraction: 55-60% (7/12/21)  QTc: 448 msec (7/8/21)    Weights:   Wt Readings from Last 3 Encounters:   07/12/21 108 kg (238 lb)   07/08/21 107 kg (236 lb)   02/04/21 111.1 kg (245 lb)   Ideal body weight: 79.9 kg (176 lb 2.4 oz)  Adjusted ideal body weight: 91.1 kg (200 lb 14.2 oz)  % IBW:  134%  There is no height or weight on file to calculate BMI.    Primary BMT Physician: Dr" [facility]  BMT RN Coordinator:  Patito Herr    Past Medical History:  No past medical history on file.    Medication Allergies:  Allergies   Allergen Reactions     Sulfamethoxazole W/Trimethoprim Rash       Current Medications (pre-admit):  Current Outpatient Medications   Medication Sig Dispense Refill     acetaminophen (TYLENOL) 500 MG tablet Take 500-1,000 mg by mouth every 6 hours as needed for mild pain        dasatinib (SPRYCEL) 140 MG tablet Take 140 mg by mouth daily       furosemide (LASIX) 40 MG tablet furosemide 40 mg tablet (Patient not taking: No sig reported)       levothyroxine (SYNTHROID/LEVOTHROID) 200 MCG tablet Take 200 mcg by mouth daily        LORazepam (ATIVAN) 1 MG tablet Take 1 mg by mouth nightly as needed for anxiety or sleep        magnesium hydroxide (MILK OF MAGNESIA) 400 MG/5ML suspension Take 30 mLs by mouth daily as needed for heartburn        melatonin 5 MG CAPS Take 5 mg by mouth At Bedtime        Metaxalone 400 MG TABS Take 800 mg by mouth as needed Has not started yet (Patient not taking: Reported on 7/13/2021)       nicotine polacrilex (NICORETTE) 4 MG gum Take 4 mg by mouth as needed for smoking cessation        Nutritional Supplements (ENSURE HIGH PROTEIN) Take 1 Can by mouth 2 times daily        omeprazole (PRILOSEC) 40 MG DR capsule Take 40 mg by mouth daily       ondansetron (ZOFRAN) 8 MG tablet ondansetron HCl 8 mg tablet (Patient not taking: No sig reported)       oxyCODONE (ROXICODONE) 5 MG tablet TAKE 1 TO 2 TABLETS BY MOUTH EVERY 4 HOURS AS NEEDED FOR CANCER RELATED PAIN (Patient not taking: Reported on 7/13/2021)       polyethylene glycol (MIRALAX) 17 GM/Dose powder Take 17 g by mouth daily as needed for constipation        predniSONE (DELTASONE) 10 MG tablet Take 10 tablets by mouth daily       prochlorperazine (COMPAZINE) 10 MG tablet  (Patient not taking: Reported on 7/13/2021)       senna (SENOKOT) 8.6 MG tablet Take 17.2 mg by mouth (Patient not taking: Reported  on 7/13/2021)       XARELTO ANTICOAGULANT 20 MG TABS tablet Take 20 mg by mouth daily          Herbal Medication/Nutritional Supplements:  None reported    Smoking/Past Drug Use:  Smoker- uses nicotine gum when needed.    Nausea/Vomiting, Pain, or other issues:  No issues noted. Pt reported minimal nausea with prior therapy.    Summary:  I met with Nik Nava and wife via telephone visit for approximately 50 minutes.  We discussed his current medications, transplant chemotherapy, immunosuppression, risk of infection/prophylactic antibiotics, and supportive care. We also reviewed allergies, future vaccinations, and pharmacy expectations post transplant. Nik Nava and his caregiver participated in our conversation and voiced their understanding of the topics discussed. Thank you for allowing me to participate in the care of this patient.    Claudia Terry, PharmD        Again, thank you for allowing me to participate in the care of your patient.        Sincerely,        [facility] Pharm D, [facility]

## 2021-07-13 NOTE — PROGRESS NOTES
"Pharmacy Assessment - Pre-Stem Cell Transplant    Assessments & Recommendations:  1) Patient currently taking Dasatinib, please notify when to stop prior to transplant.  2) Patient on Xarelto for bilateral PE (4/2021). Recommend to hold for platelets <50k. For platelets 25-50k, suggest to use Enoxaparin at half dose.  3) Patient with solitary kidney (s/p nephrectomy for RCC). Avoid nephrotoxins as able, monitor Scr closely if use is necessary.  4) Patient with allergy to Bactrim, has been on Pentamidine inh in past for PJP prophylaxis.    If this patient is admitted under observation, the patient may bring in their own supply of the following medication for use in the hospital:  1) Nicotine gum- pt uses specific brand  2) Dasatinib if not being held.  -Per \"Medications Not Supplied by Pharmacy\" policy (available on [employer])    History of Present Illness:  Nik Nava is a 61 year old year old male diagnosed with ALL.  he has been treated with Vincristine/Prednisone/Daunorubicin/Pegaspargase with IT chemotherapy , and currently on Dasatinib.  he is now being work up for VER[facility]NICA allo-sib Stem Cell Transplant on protocol 2015-32, which utilizes Flu/Cy/TBI as a conditioning regimen.    Pertinent labs/tests:  Viral Testing:  CMV(+) / HSV(+) / EBV(+)   Ejection Fraction: 55-60% (7/12/21)  QTc: 448 msec (7/8/21)    Weights:   Wt Readings from Last 3 Encounters:   07/12/21 108 kg (238 lb)   07/08/21 107 kg (236 lb)   02/04/21 111.1 kg (245 lb)   Ideal body weight: 79.9 kg (176 lb 2.4 oz)  Adjusted ideal body weight: 91.1 kg (200 lb 14.2 oz)  % IBW:  134%  There is no height or weight on file to calculate BMI.    Primary BMT Physician: Dr Hill  BMT RN Coordinator:  Patito Herr    Past Medical History:  No past medical history on file.    Medication Allergies:  Allergies   Allergen Reactions     Sulfamethoxazole W/Trimethoprim Rash       Current Medications (pre-admit):  Current Outpatient Medications   Medication Sig " Dispense Refill     acetaminophen (TYLENOL) 500 MG tablet Take 500-1,000 mg by mouth every 6 hours as needed for mild pain        dasatinib (SPRYCEL) 140 MG tablet Take 140 mg by mouth daily       furosemide (LASIX) 40 MG tablet furosemide 40 mg tablet (Patient not taking: No sig reported)       levothyroxine (SYNTHROID/LEVOTHROID) 200 MCG tablet Take 200 mcg by mouth daily        LORazepam (ATIVAN) 1 MG tablet Take 1 mg by mouth nightly as needed for anxiety or sleep        magnesium hydroxide (MILK OF MAGNESIA) 400 MG/5ML suspension Take 30 mLs by mouth daily as needed for heartburn        melatonin 5 MG CAPS Take 5 mg by mouth At Bedtime        Metaxalone 400 MG TABS Take 800 mg by mouth as needed Has not started yet (Patient not taking: Reported on 7/13/2021)       nicotine polacrilex (NICORETTE) 4 MG gum Take 4 mg by mouth as needed for smoking cessation        Nutritional Supplements (ENSURE HIGH PROTEIN) Take 1 Can by mouth 2 times daily        omeprazole (PRILOSEC) 40 MG DR capsule Take 40 mg by mouth daily       ondansetron (ZOFRAN) 8 MG tablet ondansetron HCl 8 mg tablet (Patient not taking: No sig reported)       oxyCODONE (ROXICODONE) 5 MG tablet TAKE 1 TO 2 TABLETS BY MOUTH EVERY 4 HOURS AS NEEDED FOR CANCER RELATED PAIN (Patient not taking: Reported on 7/13/2021)       polyethylene glycol (MIRALAX) 17 GM/Dose powder Take 17 g by mouth daily as needed for constipation        predniSONE (DELTASONE) 10 MG tablet Take 10 tablets by mouth daily       prochlorperazine (COMPAZINE) 10 MG tablet  (Patient not taking: Reported on 7/13/2021)       senna (SENOKOT) 8.6 MG tablet Take 17.2 mg by mouth (Patient not taking: Reported on 7/13/2021)       XARELTO ANTICOAGULANT 20 MG TABS tablet Take 20 mg by mouth daily          Herbal Medication/Nutritional Supplements:  None reported    Smoking/Past Drug Use:  Smoker- uses nicotine gum when needed.    Nausea/Vomiting, Pain, or other issues:  No issues noted. Pt  reported minimal nausea with prior therapy.    Summary:  I met with Nik Nava and wife via telephone visit for approximately 50 minutes.  We discussed his current medications, transplant chemotherapy, immunosuppression, risk of infection/prophylactic antibiotics, and supportive care. We also reviewed allergies, future vaccinations, and pharmacy expectations post transplant. Nik Nava and his caregiver participated in our conversation and voiced their understanding of the topics discussed. Thank you for allowing me to participate in the care of this patient.    Claudia Terry, PharmD

## 2021-07-14 ENCOUNTER — LAB (OUTPATIENT)
Dept: LAB | Facility: CLINIC | Age: 61
End: 2021-07-14
Payer: COMMERCIAL

## 2021-07-14 ENCOUNTER — ANCILLARY PROCEDURE (OUTPATIENT)
Dept: GENERAL RADIOLOGY | Facility: CLINIC | Age: 61
End: 2021-07-14
Attending: NURSE PRACTITIONER
Payer: COMMERCIAL

## 2021-07-14 ENCOUNTER — LAB (OUTPATIENT)
Dept: LAB | Facility: CLINIC | Age: 61
End: 2021-07-14

## 2021-07-14 VITALS
HEART RATE: 77 BPM | TEMPERATURE: 97.8 F | SYSTOLIC BLOOD PRESSURE: 147 MMHG | DIASTOLIC BLOOD PRESSURE: 84 MMHG | OXYGEN SATURATION: 98 % | RESPIRATION RATE: 17 BRPM

## 2021-07-14 DIAGNOSIS — C91.01 ACUTE LYMPHOID LEUKEMIA IN REMISSION (H): Primary | ICD-10-CM

## 2021-07-14 DIAGNOSIS — Z01.818 PREOP EXAMINATION: ICD-10-CM

## 2021-07-14 DIAGNOSIS — Z11.59 SPECIAL SCREENING EXAMINATION FOR VIRAL DISEASE: ICD-10-CM

## 2021-07-14 DIAGNOSIS — C91.01 ACUTE LYMPHOBLASTIC LEUKEMIA (ALL) IN REMISSION (H): Primary | ICD-10-CM

## 2021-07-14 DIAGNOSIS — C91.01 ACUTE LYMPHOBLASTIC LEUKEMIA (ALL) IN REMISSION (H): ICD-10-CM

## 2021-07-14 LAB
GLUCOSE CSF-MCNC: 56 MG/DL (ref 40–70)
HOLD SPECIMEN: NORMAL
HOLD SPECIMEN: NORMAL
PATH REPORT.COMMENTS IMP SPEC: NORMAL
PATH REPORT.FINAL DX SPEC: NORMAL
PATH REPORT.GROSS SPEC: NORMAL
PATH REPORT.MICROSCOPIC SPEC OTHER STN: NORMAL
PATH REPORT.RELEVANT HX SPEC: NORMAL
PATH REPORT.RELEVANT HX SPEC: NORMAL
PATH REPORT.SITE OF ORIGIN SPEC: NORMAL
PROT CSF-MCNC: 46 MG/DL (ref 15–60)

## 2021-07-14 PROCEDURE — 88108 CYTOPATH CONCENTRATE TECH: CPT | Performed by: PATHOLOGY

## 2021-07-14 PROCEDURE — 88185 FLOWCYTOMETRY/TC ADD-ON: CPT | Performed by: INTERNAL MEDICINE

## 2021-07-14 PROCEDURE — 82945 GLUCOSE OTHER FLUID: CPT | Mod: 90 | Performed by: PATHOLOGY

## 2021-07-14 PROCEDURE — 89050 BODY FLUID CELL COUNT: CPT | Mod: 90 | Performed by: PATHOLOGY

## 2021-07-14 PROCEDURE — 84157 ASSAY OF PROTEIN OTHER: CPT | Mod: 90 | Performed by: PATHOLOGY

## 2021-07-14 PROCEDURE — 88321 CONSLTJ&REPRT SLD PREP ELSWR: CPT | Performed by: PATHOLOGY

## 2021-07-14 PROCEDURE — 62328 DX LMBR SPI PNXR W/FLUOR/CT: CPT | Performed by: PHYSICIAN ASSISTANT

## 2021-07-14 PROCEDURE — 88187 FLOWCYTOMETRY/READ 2-8: CPT | Performed by: PATHOLOGY

## 2021-07-14 PROCEDURE — 88184 FLOWCYTOMETRY/ TC 1 MARKER: CPT | Performed by: INTERNAL MEDICINE

## 2021-07-14 PROCEDURE — 36415 COLL VENOUS BLD VENIPUNCTURE: CPT | Performed by: PATHOLOGY

## 2021-07-14 PROCEDURE — 99000 SPECIMEN HANDLING OFFICE-LAB: CPT | Performed by: PATHOLOGY

## 2021-07-14 RX ORDER — LIDOCAINE HYDROCHLORIDE 10 MG/ML
30 INJECTION, SOLUTION EPIDURAL; INFILTRATION; INTRACAUDAL; PERINEURAL ONCE
Status: COMPLETED | OUTPATIENT
Start: 2021-07-14 | End: 2021-07-14

## 2021-07-14 RX ADMIN — LIDOCAINE HYDROCHLORIDE 5 ML: 10 INJECTION, SOLUTION EPIDURAL; INFILTRATION; INTRACAUDAL; PERINEURAL at 13:10

## 2021-07-14 NOTE — PROGRESS NOTES
Interventional Radiology Brief Post Procedure Note    Procedure: IR dx Lumbar Puncture    Proceduralist: Mary Felix PA-C and Cornelio Rodrigues PA-c    Time Out: Prior to the start of the procedure and with procedural staff participation, I verbally confirmed the patient s identity using two indicators, relevant allergies, that the procedure was appropriate and matched the consent or emergent situation, and that the correct equipment/implants were available. Immediately prior to starting the procedure I conducted the Time Out with the procedural staff and re-confirmed the patient s name, procedure, and site/side. (The Joint Commission universal protocol was followed.)  Yes    Sedation: None. Local Anesthestic used    Findings: attempted LP at L4-L5 with aspiration of small amount of hematoma.  Completed lumbar puncture at L3-L4 with removal of 10 ml of clear CSF fluid.    Estimated Blood Loss: Minimal    Fluoroscopy Time: 1.5  minutes    SPECIMENS: Fluid and/or tissue for laboratory analysis    Complications: 1. None     Condition: Stable    Plan: Bed rest for 1 hour.      Comments: See dictated procedure note for full details.    Mary Felix PA-C

## 2021-07-15 LAB
PATH REPORT.COMMENTS IMP SPEC: NORMAL
PATH REPORT.FINAL DX SPEC: NORMAL
PATH REPORT.MICROSCOPIC SPEC OTHER STN: NORMAL
PATH REPORT.RELEVANT HX SPEC: NORMAL

## 2021-07-16 LAB
PATH REPORT.COMMENTS IMP SPEC: NORMAL
PATH REPORT.COMMENTS IMP SPEC: NORMAL
PATH REPORT.FINAL DX SPEC: NORMAL
PATH REPORT.GROSS SPEC: NORMAL
PATH REPORT.RELEVANT HX SPEC: NORMAL

## 2021-07-18 ENCOUNTER — MEDICAL CORRESPONDENCE (OUTPATIENT)
Dept: TRANSPLANT | Facility: CLINIC | Age: 61
End: 2021-07-18

## 2021-07-18 NOTE — PROGRESS NOTES
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    Oncology History    No history exists.     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  He initially was treated with dasatinib and dexamethasone which led to significant improvement in his disease and then was referred to [facility].  Further induction was recommended and he returned to receive 2 cycles of vincristine, prednisone, daunorubicin, and pegasparaginase with intrathecal methotrexate.  This treatment was complicated by a pulmonary embolus leading to anticoagulation.  He also developed mild pancreatitis that led to the hospital stay.  Repeat marrow showed that his blast dropped to 1%.  He then received 1 course of high-dose mele-C.  He now has undergone a pretransplant evaluation and a bone marrow aspirate and biopsy with FISH, flow cytometry for MRD, and cytogenetics all demonstrate complete remission without detection of minimal residual disease.    He comes in the clinic today feeling well.  An extensive review of systems only reveals some soreness underneath his left lower posterior molar.  He did have a tooth pulled recently on the right due to a crack and some associated pain.  The current pain has been going on for about a week.  The pain is relatively mild but new, and there is no  "associated periodontal disease.  He is tried flossing and the discomfort continues.  He is eating and drinking well, has no new bone pain or joint pain, if not requiring a particular help and only has mild fatigue.    Bone Marrow Workup Results:  Blood Counts Recent Labs   Lab Test 07/08/21  1055   WBC 8.9   ANEU 7.2   ALYM 1.0   BOSTON 0.6   AEOS 0.0   HGB 8.5*   HCT 25.9*   *      Bone Marrow Final Diagnosis   Bone marrow, posterior iliac crest, decalcified trephine biopsy, aspirate clot, touch imprints, aspirate smears, and peripheral blood (, obtained 06/28/2021):       Normocellular to slightly hypercellular marrow (cellularity estimated at 40-50%) with trilineage hematopoiesis with mild myeloid predominance, 1% blasts  No morphologic or immunophenotypic evidence for B lymphoblastic leukemia/lymphoma  See comment     Peripheral blood showing moderate macrocytic, normochromic anemia (8.8 g/dL, 103 fL) with increased red cell regeneration, Leukocytosis with slight neutrophilia (WBC 18 point 1K per microliter, ANC 12.8) rare circulating blasts (1%), monocytosis, thrombocytosis (939K per microliter)   Electronically signed by Fiorella Boland MD on 7/14/2021 at  6:24 PM   Comment    The significance of rare circulating blasts is uncertain in the context of leukocytosis with neutrophilia, correlation with history of prior chemotherapy and growth factor treatment is required.  As well, correlation with clinical history of potential other comorbidities around the time of the biopsy is recommended.  A general note and comment about the diagnosis of BCR-ABL1 positive B-ALL:  the patient was diagnosed with B-ALL with BCR-ABL1 translocation, which is the \"default\" diagnosis when  patient present with B lymphoblasts and the karyotype is positive for BCR-ABL1, however there is always a possibility that this could be B lymphoblastic blast crisis of underlying CML which has never been diagnosed. This " differential is hardly possible to resolve at the time of the diagnosis using clinically available tools. Follow-up cases sometimes show persistence of features of CML without B lymphoblasts suggesting underlying CML. We do not have any reason to suspect this in this case and  do not know if this could be the case in this patient . This is a general comment to mention this as a possibility if there was a discrepancy between high sensitivity BCR-ABL1 testing  results and flow cytometry findings.   By report flow cytometry was performed and showed no qualitatively abnormal immunophenotypic blast population.  By report conventional karyotyping showed 46, XY [20], by report FISH revealed no BCR-ABL 1 translocation.  Please correlate with results of PCR for BCR-ABL1.      MRD by MP flow cytometry and FISH negative ([facility] - see outside report thru [facility])   Blood Type Recent Labs   Lab Test 07/08/21  1056   ABO O      Chemistries Recent Labs   Lab Test 07/08/21  1055      POTASSIUM 3.9   CHLORIDE 108   CO2 23   BUN 24   CR 0.94      Liver Tests Recent Labs   Lab Test 07/08/21  1055   BILITOTAL 0.3   ALKPHOS 60   AST 22   ALT 28      Chest X-Ray:  7/12/21 Impression: No acute cardiopulmonary process.     Chest CT  7/12/21 IMPRESSION:   1. No focal airspace opacity. No suspicious or enlarged pulmonary  nodules.  2. Additional incidental findings as described above report including  stable right nephrectomy changes and left renal cyst.    PFTs FVC%  Recent Labs   Lab Test 07/12/21  1125   [id number] 101       FEV1%  Recent Labs   Lab Test 07/12/21  1125   [id number] 98       DLCO%  Recent Labs   Lab Test 07/12/21  1125   [id number] 141      ECHO or MUGA: Procedure  Echocardiogram with two-dimensional, color and spectral Doppler performed.  ______________________________________________________________________________  Interpretation Summary  Global and regional left ventricular function is normal with an EF of 55-60%.  3D LVEF  volumetric analysis is 59%.  Global peak LV longitudinal strain is averaged at -20%. This is normal (normal  <-18%).  Global right ventricular function is normal. The right ventricle is normal  size.  No significant valvular abnormalities.  IVC diameter <2.1 cm collapsing >50% with sniff suggests a normal RA pressure  of 3 mmHg.  There is no prior study for direct comparison.     EKG NSR   Serologies: CMV pos, EBV pos, Toxo (if Bactrim allergic) NA     Vitamin D No results for input(s): VITDT in the last 41730 hours.     Family History:   Family History   Problem Relation Age of Onset     Lung Cancer Mother      Polycythemia Father      Coronary Artery Disease Maternal Grandmother        Social History:   Social History     Socioeconomic History     Marital status:      Spouse name: Not on file     Number of children: Not on file     Years of education: Not on file     Highest education level: Not on file   Occupational History     Not on file   Tobacco Use     Smoking status: Former Smoker     Packs/day: 2.00     Years: 30.00     Pack years: 60.00     Quit date: 2019     Years since quittin.2     Smokeless tobacco: Never Used   Substance and Sexual Activity     Alcohol use: Not Currently     Drug use: Never     Sexual activity: Not on file   Other Topics Concern     Parent/sibling w/ CABG, MI or angioplasty before 65F 55M? Not Asked   Social History Narrative     Not on file     Social Determinants of Health     Financial Resource Strain:      Difficulty of Paying Living Expenses:    Food Insecurity:      Worried About Running Out of Food in the Last Year:      Ran Out of Food in the Last Year:    Transportation Needs:      Lack of Transportation (Medical):      Lack of Transportation (Non-Medical):    Physical Activity:      Days of Exercise per Week:      Minutes of Exercise per Session:    Stress:      Feeling of Stress :    Social Connections:      Frequency of Communication with Friends and  Family:      Frequency of Social Gatherings with Friends and Family:      Attends [employer] Services:      Active Member of Clubs or Organizations:      Attends Club or Organization Meetings:      Marital Status:    Intimate Partner Violence:      Fear of Current or Ex-Partner:      Emotionally Abused:      Physically Abused:      Sexually Abused:        Past Medical History:   Past Medical History:   Diagnosis Date     Cancer (H)     renal cell     CKD (chronic kidney disease)      Thyroid disease         Past Surgical History:   Past Surgical History:   Procedure Laterality Date     BACK SURGERY  2017       Allergies:   Allergies   Allergen Reactions     Sulfamethoxazole W/Trimethoprim Rash       Home Medications      Prior to Admission medications    Medication Sig Start Date End Date Taking? Authorizing Provider   acetaminophen (TYLENOL) 500 MG tablet Take 500-1,000 mg by mouth every 6 hours as needed for mild pain     Reported, Patient   dasatinib (SPRYCEL) 140 MG tablet Take 140 mg by mouth daily 1/15/21   Reported, Patient   furosemide (LASIX) 40 MG tablet furosemide 40 mg tablet  Patient not taking: No sig reported 3/25/21   Reported, Patient   levothyroxine (SYNTHROID/LEVOTHROID) 200 MCG tablet Take 200 mcg by mouth daily  6/6/21   Reported, Patient   LORazepam (ATIVAN) 1 MG tablet Take 1 mg by mouth nightly as needed for anxiety or sleep  1/13/21   Reported, Patient   magnesium hydroxide (MILK OF MAGNESIA) 400 MG/5ML suspension Take 30 mLs by mouth daily as needed for heartburn     Reported, Patient   melatonin 5 MG CAPS Take 5 mg by mouth At Bedtime  1/7/21   Reported, Patient   Metaxalone 400 MG TABS Take 800 mg by mouth as needed Has not started yet  Patient not taking: Reported on 7/13/2021 1/8/21   Reported, Patient   nicotine polacrilex (NICORETTE) 4 MG gum Take 4 mg by mouth as needed for smoking cessation     Reported, Patient   Nutritional Supplements (ENSURE HIGH PROTEIN) Take 1 Can by mouth 2  times daily     Reported, Patient   omeprazole (PRILOSEC) 40 MG DR capsule Take 40 mg by mouth daily 5/11/21   Reported, Patient   ondansetron (ZOFRAN) 8 MG tablet ondansetron HCl 8 mg tablet  Patient not taking: No sig reported 3/4/21   Reported, Patient   oxyCODONE (ROXICODONE) 5 MG tablet TAKE 1 TO 2 TABLETS BY MOUTH EVERY 4 HOURS AS NEEDED FOR CANCER RELATED PAIN  Patient not taking: Reported on 7/13/2021 6/14/21   Reported, Patient   polyethylene glycol (MIRALAX) 17 GM/Dose powder Take 17 g by mouth daily as needed for constipation     Reported, Patient   predniSONE (DELTASONE) 10 MG tablet Take 10 tablets by mouth daily 6/24/21   Reported, Patient   prochlorperazine (COMPAZINE) 10 MG tablet  3/4/21   Reported, Patient   senna (SENOKOT) 8.6 MG tablet Take 17.2 mg by mouth  Patient not taking: Reported on 7/13/2021    Reported, Patient   XARELTO ANTICOAGULANT 20 MG TABS tablet Take 20 mg by mouth daily  7/7/21   Reported, Patient       Review of Systems    Review of Systems:  CONSTITUTIONAL: NEGATIVE for fever, chills, change in weight  INTEGUMENTARY/SKIN: NEGATIVE for worrisome rashes, moles or lesions  EYES: NEGATIVE for vision changes or irritation  ENT/MOUTH: NEGATIVE for ear, mouth and throat problems.  Mild discomfort under his left posterior molar  RESP: NEGATIVE for significant cough or SOB  BREAST: NEGATIVE for masses, tenderness or discharge  CV: NEGATIVE for chest pain, palpitations or peripheral edema  GI: NEGATIVE for nausea, abdominal pain, heartburn, or change in bowel habits  : NEGATIVE for frequency, dysuria, or hematuria  MUSCULOSKELETAL: NEGATIVE for significant arthralgias or myalgia  NEURO: NEGATIVE for weakness, dizziness or paresthesias  ENDOCRINE: NEGATIVE for temperature intolerance, skin/hair changes  HEME/ALLERGY: NEGATIVE for bleeding problems  PSYCHIATRIC: NEGATIVE for changes in mood or affect    PHYSICAL EXAM      Weight     Wt Readings from Last 3 Encounters:   07/19/21 108 kg  "(238 lb)   07/13/21 108 kg (238 lb)   07/12/21 108 kg (238 lb)          BP (!) 144/74   Pulse 84   Temp 98  F (36.7  C) (Oral)   Resp 18   Ht 1.854 m (6' 1\")   Wt 108 kg (238 lb)   SpO2 98%   BMI 31.40 kg/m       KPS: 90    General: NAD   Eyes: YARELIS, sclera anicteric   Nose/Mouth/Throat: OP clear, buccal mucosa moist, no ulcerations   Lungs: CTA bilaterally  Cardiovascular: RRR, no M/R/G   Abdominal/Rectal: +BS, soft, NT, ND, No HSM   Lymphatics: No edema  Skin: No rashes or petechaie  Neuro: A&O   Vascular Access: port in the right chest    OVERALL ASSESSMENT AND PLAN     Problems  1.  Acute lymphoblastic leukemia, Daniels chromosome positive-he is an appropriate candidate for transplantation as described below    2.  Recent pulmonary emboli-he developed a pulmonary emboli in the setting of receiving PEG asparaginase.  He is currently receiving anticoagulation.    3.  History of kidney cancer-he has a history of renal cancer and has a prior resection.  For that reason he has a single unilateral kidney.    4.  Dental pain-he describes a feeling as if something is stuck between his 2 teeth in the posterior aspect of his left lower jaw.  I have asked him to seek dental attention to take a look at this problem before admission.      BMT and Cell Therapy Informed Consent Discussion    Nik Nava is a 61 year old male with a malignancy that is currently incurable by standard chemotherapeutic approaches: ph pos ALL. To date, the only modality that offers a chance at long-term survival and potential cure is allogeneic hematopoietic stem cell transplantation.  Based upon my assessment of disease risk and comorbidities, Nik is a suitable candidate for allogeneic HCT.      We had a detailed discussion about the rationale for transplant in this situation, requirements for proceeding, which include insurance approval, identification of an adequate source of donor hematopoetic progenitor cells, availability of " a full-time caregiver along with residence within 30 minutes of the [place]  through at least day +100 post transplant, and compliance with the immunosuppression and supportive care medication regimen prescribed by providers at the [facility].      We had a alice discussion about the risks of the transplant itself, including toxicities from the preparative regimen, severe infections, the need for red blood cell and platelet transfusion support, the risk of graft rejection, the risk of acute and chronic [id number] disease, the need for immunosuppressive medications, the potential for severe organ toxicity including lungs, liver, skin, and kidneys, the possibility of long-term disability, and the risk of death due to complications of the transplant.  We discussed the risk of disease relapse despite going forward with a transplant.  Nik expressed understanding of these risks.    The patient's testing shows no evidence of infection that require continue management during the treatment procedures. I reviewed and discussed findings with the patient and the management plan during treatment is to include a matched sibling PBSCT following cyclophosphamide/fludarabine/2Gy TBI    Nik Nava had all questions answered and is ready to proceed as discussed in detail today.    In today's visit, we discussed in detail the research for which Nik Nava is eligible. We discussed the potential risks and potential benefits of each protocol individually. We explained potential alternatives to the protocols discussed. We explained to the patient that participation is voluntary and that consent may be withdrawn at any time.     The patient completed the last round of treatment on June 20.    HCT-CI score: 3 (prior solid tumor). We counseled the patient about the impact of this on the risk of treatment related and overall mortality. The score fit within treatment protocol eligibility criteria. (Link to HCT calculator:  Https://[email or web address]/calculate-[email or web address]-ci)    Karnofsky performance score: 90     ECOG (required for CAR-T, see KPS to ECOG conversion chart): 0  Lansky play score (pedatrics): NA    Active infections:  None.  Prior infections that require additional special prophylaxis considerations: None.  I reviewed and discussed infectious disease evaluation with the patient and the management plan during treatment.    Reproductive status: What methods of birth control does the patient plan to use during the treatment period beginning with conditioning and ending with the discontinuation of immune suppression (indicate with an X all that apply):  __ The patient is confirmed to be sterile or post-menopausal  XSexual abstinence  __ Condoms  __ Implants  __ Injectables  __ Oral contraceptives  __ Intrauterine devices (IUD)  __ Other (describe)    The patient received appropriate reproductive counseling and agreed with the need for effective contraception during the treatment procedures.      Dental health suitable to proceed: he had seen a dentist two weeks ago, but now has new pain as if something is stuck between his teeth left lower molar.  I have asked him to return for a dental eval ASAP this week.     After our detailed discussion above, the patient signed the following consents for treatment and protocols:    [id number]      The patient will receive a signed copy of the consents. The patient had opportunity to ask questions that were answered to the best of my ability and to the patient's apparent satisfaction.    DOMINIQUE BRITTON MD  July 18, 2021    Total time: 45, with greater than 50% of the time spend in direct counseling        Patient Care Team:  Wojciech Soares as PCP - General (Medical Oncology)  Wojciech Soares as MD (Medical Oncology)  Chapo Hill MD as Assigned Cancer Care Provider  Patito Herr RN as Specialty Care Coordinator  Yamilet Garcia MSW as

## 2021-07-19 ENCOUNTER — OFFICE VISIT (OUTPATIENT)
Dept: TRANSPLANT | Facility: CLINIC | Age: 61
End: 2021-07-19
Attending: INTERNAL MEDICINE
Payer: COMMERCIAL

## 2021-07-19 VITALS
OXYGEN SATURATION: 98 % | DIASTOLIC BLOOD PRESSURE: 74 MMHG | HEIGHT: 73 IN | RESPIRATION RATE: 18 BRPM | WEIGHT: 238 LBS | HEART RATE: 84 BPM | BODY MASS INDEX: 31.54 KG/M2 | SYSTOLIC BLOOD PRESSURE: 144 MMHG | TEMPERATURE: 98 F

## 2021-07-19 DIAGNOSIS — C91.01 ACUTE LYMPHOBLASTIC LEUKEMIA (ALL) IN REMISSION (H): ICD-10-CM

## 2021-07-19 DIAGNOSIS — C91.01 ACUTE LYMPHOBLASTIC LEUKEMIA (ALL) IN REMISSION (H): Primary | ICD-10-CM

## 2021-07-19 DIAGNOSIS — Z11.59 SPECIAL SCREENING EXAMINATION FOR VIRAL DISEASE: ICD-10-CM

## 2021-07-19 DIAGNOSIS — Z01.818 PREOP EXAMINATION: ICD-10-CM

## 2021-07-19 PROCEDURE — 99215 OFFICE O/P EST HI 40 MIN: CPT

## 2021-07-19 PROCEDURE — G0463 HOSPITAL OUTPT CLINIC VISIT: HCPCS

## 2021-07-19 ASSESSMENT — MIFFLIN-ST. JEOR: SCORE: 1928.44

## 2021-07-19 ASSESSMENT — PAIN SCALES - GENERAL: PAINLEVEL: NO PAIN (0)

## 2021-07-19 NOTE — NURSING NOTE
"Oncology Rooming Note    July 19, 2021 1:43 PM   Nik Nava is a 61 year old male who presents for:    Chief Complaint   Patient presents with     Oncology Clinic Visit     Return; ALL     Initial Vitals: BP (!) 144/74   Pulse 84   Temp 98  F (36.7  C) (Oral)   Resp 18   Ht 1.854 m (6' 1\")   Wt 108 kg (238 lb)   SpO2 98%   BMI 31.40 kg/m   Estimated body mass index is 31.4 kg/m  as calculated from the following:    Height as of this encounter: 1.854 m (6' 1\").    Weight as of this encounter: 108 kg (238 lb). Body surface area is 2.36 meters squared.  No Pain (0) Comment: Data Unavailable   No LMP for male patient.  Allergies reviewed: Yes  Medications reviewed: Yes    Medications: Medication refills not needed today.  Pharmacy name entered into [employer]: [facility] - [place], [place] - [address] [facility]    Clinical concerns: BMT Workup       Lauren Sommer CMA              "

## 2021-07-19 NOTE — LETTER
7/19/2021         RE: Nik Nava  [address] [facility] [id number]        Dear Colleague,    Thank you for referring your patient, Nik Nava, to the [facility] BLOOD AND MARROW TRANSPLANT PROGRAM [place]. Please see a copy of my visit note below.      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    Oncology History    No history exists.     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  He initially was treated with dasatinib and dexamethasone which led to significant improvement in his disease and then was referred to [facility].  Further induction was recommended and he returned to receive 2 cycles of vincristine, prednisone, daunorubicin, and pegasparaginase with intrathecal methotrexate.  This treatment was complicated by a pulmonary embolus leading to anticoagulation.  He also developed mild pancreatitis that led to the hospital stay.  Repeat marrow showed that his blast dropped to 1%.  He then received 1 course of high-dose mele-C.  He now has undergone a pretransplant evaluation and a bone marrow aspirate and biopsy with FISH, flow cytometry for MRD, and cytogenetics all demonstrate complete remission without detection of minimal residual disease.    He comes in the clinic today feeling well.  An extensive  review of systems only reveals some soreness underneath his left lower posterior molar.  He did have a tooth pulled recently on the right due to a crack and some associated pain.  The current pain has been going on for about a week.  The pain is relatively mild but new, and there is no associated periodontal disease.  He is tried flossing and the discomfort continues.  He is eating and drinking well, has no new bone pain or joint pain, if not requiring a particular help and only has mild fatigue.    Bone Marrow Workup Results:  Blood Counts Recent Labs   Lab Test 07/08/21  1055   WBC 8.9   ANEU 7.2   ALYM 1.0   BOSTON 0.6   AEOS 0.0   HGB 8.5*   HCT 25.9*   *      Bone Marrow Final Diagnosis   Bone marrow, posterior iliac crest, decalcified trephine biopsy, aspirate clot, touch imprints, aspirate smears, and peripheral blood (, obtained 06/28/2021):       Normocellular to slightly hypercellular marrow (cellularity estimated at 40-50%) with trilineage hematopoiesis with mild myeloid predominance, 1% blasts  No morphologic or immunophenotypic evidence for B lymphoblastic leukemia/lymphoma  See comment     Peripheral blood showing moderate macrocytic, normochromic anemia (8.8 g/dL, 103 fL) with increased red cell regeneration, Leukocytosis with slight neutrophilia (WBC 18 point 1K per microliter, ANC 12.8) rare circulating blasts (1%), monocytosis, thrombocytosis (939K per microliter)   Electronically signed by Fiorella Boland MD on 7/14/2021 at  6:24 PM   Comment    The significance of rare circulating blasts is uncertain in the context of leukocytosis with neutrophilia, correlation with history of prior chemotherapy and growth factor treatment is required.  As well, correlation with clinical history of potential other comorbidities around the time of the biopsy is recommended.  A general note and comment about the diagnosis of BCR-ABL1 positive B-ALL:  the patient was diagnosed with B-ALL with BCR-ABL1  "translocation, which is the \"default\" diagnosis when  patient present with B lymphoblasts and the karyotype is positive for BCR-ABL1, however there is always a possibility that this could be B lymphoblastic blast crisis of underlying CML which has never been diagnosed. This differential is hardly possible to resolve at the time of the diagnosis using clinically available tools. Follow-up cases sometimes show persistence of features of CML without B lymphoblasts suggesting underlying CML. We do not have any reason to suspect this in this case and  do not know if this could be the case in this patient . This is a general comment to mention this as a possibility if there was a discrepancy between high sensitivity BCR-ABL1 testing  results and flow cytometry findings.   By report flow cytometry was performed and showed no qualitatively abnormal immunophenotypic blast population.  By report conventional karyotyping showed 46, XY [20], by report FISH revealed no BCR-ABL 1 translocation.  Please correlate with results of PCR for BCR-ABL1.      MRD by MP flow cytometry and FISH negative ([facility] - see outside report thru [facility])   Blood Type Recent Labs   Lab Test 07/08/21  1056   ABO O      Chemistries Recent Labs   Lab Test 07/08/21  1055      POTASSIUM 3.9   CHLORIDE 108   CO2 23   BUN 24   CR 0.94      Liver Tests Recent Labs   Lab Test 07/08/21  1055   BILITOTAL 0.3   ALKPHOS 60   AST 22   ALT 28      Chest X-Ray:  7/12/21 Impression: No acute cardiopulmonary process.     Chest CT  7/12/21 IMPRESSION:   1. No focal airspace opacity. No suspicious or enlarged pulmonary  nodules.  2. Additional incidental findings as described above report including  stable right nephrectomy changes and left renal cyst.    PFTs FVC%  Recent Labs   Lab Test 07/12/21 1125 20003 101       FEV1%  Recent Labs   Lab Test 07/12/21 1125 20016 98       DLCO%  Recent Labs   Lab Test 07/12/21 1125 20143 141      ECHO or MUGA: " Procedure  Echocardiogram with two-dimensional, color and spectral Doppler performed.  ______________________________________________________________________________  Interpretation Summary  Global and regional left ventricular function is normal with an EF of 55-60%.  3D LVEF volumetric analysis is 59%.  Global peak LV longitudinal strain is averaged at -20%. This is normal (normal  <-18%).  Global right ventricular function is normal. The right ventricle is normal  size.  No significant valvular abnormalities.  IVC diameter <2.1 cm collapsing >50% with sniff suggests a normal RA pressure  of 3 mmHg.  There is no prior study for direct comparison.     EKG NSR   Serologies: CMV pos, EBV pos, Toxo (if Bactrim allergic) NA     Vitamin D No results for input(s): VITDT in the last 39705 hours.     Family History:   Family History   Problem Relation Age of Onset     Lung Cancer Mother      Polycythemia Father      Coronary Artery Disease Maternal Grandmother        Social History:   Social History     Socioeconomic History     Marital status:      Spouse name: Not on file     Number of children: Not on file     Years of education: Not on file     Highest education level: Not on file   Occupational History     Not on file   Tobacco Use     Smoking status: Former Smoker     Packs/day: 2.00     Years: 30.00     Pack years: 60.00     Quit date: 2019     Years since quittin.2     Smokeless tobacco: Never Used   Substance and Sexual Activity     Alcohol use: Not Currently     Drug use: Never     Sexual activity: Not on file   Other Topics Concern     Parent/sibling w/ CABG, MI or angioplasty before 65F 55M? Not Asked   Social History Narrative     Not on file     Social Determinants of Health     Financial Resource Strain:      Difficulty of Paying Living Expenses:    Food Insecurity:      Worried About Running Out of Food in the Last Year:      Ran Out of Food in the Last Year:    Transportation Needs:       Lack of Transportation (Medical):      Lack of Transportation (Non-Medical):    Physical Activity:      Days of Exercise per Week:      Minutes of Exercise per Session:    Stress:      Feeling of Stress :    Social Connections:      Frequency of Communication with Friends and Family:      Frequency of Social Gatherings with Friends and Family:      Attends Taoism Services:      Active Member of Clubs or Organizations:      Attends Club or Organization Meetings:      Marital Status:    Intimate Partner Violence:      Fear of Current or Ex-Partner:      Emotionally Abused:      Physically Abused:      Sexually Abused:        Past Medical History:   Past Medical History:   Diagnosis Date     Cancer (H)     renal cell     CKD (chronic kidney disease)      Thyroid disease         Past Surgical History:   Past Surgical History:   Procedure Laterality Date     BACK SURGERY  2017       Allergies:   Allergies   Allergen Reactions     Sulfamethoxazole W/Trimethoprim Rash       Home Medications      Prior to Admission medications    Medication Sig Start Date End Date Taking? Authorizing Provider   acetaminophen (TYLENOL) 500 MG tablet Take 500-1,000 mg by mouth every 6 hours as needed for mild pain     Reported, Patient   dasatinib (SPRYCEL) 140 MG tablet Take 140 mg by mouth daily 1/15/21   Reported, Patient   furosemide (LASIX) 40 MG tablet furosemide 40 mg tablet  Patient not taking: No sig reported 3/25/21   Reported, Patient   levothyroxine (SYNTHROID/LEVOTHROID) 200 MCG tablet Take 200 mcg by mouth daily  6/6/21   Reported, Patient   LORazepam (ATIVAN) 1 MG tablet Take 1 mg by mouth nightly as needed for anxiety or sleep  1/13/21   Reported, Patient   magnesium hydroxide (MILK OF MAGNESIA) 400 MG/5ML suspension Take 30 mLs by mouth daily as needed for heartburn     Reported, Patient   melatonin 5 MG CAPS Take 5 mg by mouth At Bedtime  1/7/21   Reported, Patient   Metaxalone 400 MG TABS Take 800 mg by mouth as needed  Has not started yet  Patient not taking: Reported on 7/13/2021 1/8/21   Reported, Patient   nicotine polacrilex (NICORETTE) 4 MG gum Take 4 mg by mouth as needed for smoking cessation     Reported, Patient   Nutritional Supplements (ENSURE HIGH PROTEIN) Take 1 Can by mouth 2 times daily     Reported, Patient   omeprazole (PRILOSEC) 40 MG DR capsule Take 40 mg by mouth daily 5/11/21   Reported, Patient   ondansetron (ZOFRAN) 8 MG tablet ondansetron HCl 8 mg tablet  Patient not taking: No sig reported 3/4/21   Reported, Patient   oxyCODONE (ROXICODONE) 5 MG tablet TAKE 1 TO 2 TABLETS BY MOUTH EVERY 4 HOURS AS NEEDED FOR CANCER RELATED PAIN  Patient not taking: Reported on 7/13/2021 6/14/21   Reported, Patient   polyethylene glycol (MIRALAX) 17 GM/Dose powder Take 17 g by mouth daily as needed for constipation     Reported, Patient   predniSONE (DELTASONE) 10 MG tablet Take 10 tablets by mouth daily 6/24/21   Reported, Patient   prochlorperazine (COMPAZINE) 10 MG tablet  3/4/21   Reported, Patient   senna (SENOKOT) 8.6 MG tablet Take 17.2 mg by mouth  Patient not taking: Reported on 7/13/2021    Reported, Patient   XARELTO ANTICOAGULANT 20 MG TABS tablet Take 20 mg by mouth daily  7/7/21   Reported, Patient       Review of Systems    Review of Systems:  CONSTITUTIONAL: NEGATIVE for fever, chills, change in weight  INTEGUMENTARY/SKIN: NEGATIVE for worrisome rashes, moles or lesions  EYES: NEGATIVE for vision changes or irritation  ENT/MOUTH: NEGATIVE for ear, mouth and throat problems.  Mild discomfort under his left posterior molar  RESP: NEGATIVE for significant cough or SOB  BREAST: NEGATIVE for masses, tenderness or discharge  CV: NEGATIVE for chest pain, palpitations or peripheral edema  GI: NEGATIVE for nausea, abdominal pain, heartburn, or change in bowel habits  : NEGATIVE for frequency, dysuria, or hematuria  MUSCULOSKELETAL: NEGATIVE for significant arthralgias or myalgia  NEURO: NEGATIVE for weakness,  "dizziness or paresthesias  ENDOCRINE: NEGATIVE for temperature intolerance, skin/hair changes  HEME/ALLERGY: NEGATIVE for bleeding problems  PSYCHIATRIC: NEGATIVE for changes in mood or affect    PHYSICAL EXAM      Weight     Wt Readings from Last 3 Encounters:   07/19/21 108 kg (238 lb)   07/13/21 108 kg (238 lb)   07/12/21 108 kg (238 lb)          BP (!) 144/74   Pulse 84   Temp 98  F (36.7  C) (Oral)   Resp 18   Ht 1.854 m (6' 1\")   Wt 108 kg (238 lb)   SpO2 98%   BMI 31.40 kg/m       KPS: 90    General: NAD   Eyes: YARELIS, sclera anicteric   Nose/Mouth/Throat: OP clear, buccal mucosa moist, no ulcerations   Lungs: CTA bilaterally  Cardiovascular: RRR, no M/R/G   Abdominal/Rectal: +BS, soft, NT, ND, No HSM   Lymphatics: No edema  Skin: No rashes or petechaie  Neuro: A&O   Vascular Access: port in the right chest    OVERALL ASSESSMENT AND PLAN     Problems  1.  Acute lymphoblastic leukemia, Andrews chromosome positive-he is an appropriate candidate for transplantation as described below    2.  Recent pulmonary emboli-he developed a pulmonary emboli in the setting of receiving PEG asparaginase.  He is currently receiving anticoagulation.    3.  History of kidney cancer-he has a history of renal cancer and has a prior resection.  For that reason he has a single unilateral kidney.    4.  Dental pain-he describes a feeling as if something is stuck between his 2 teeth in the posterior aspect of his left lower jaw.  I have asked him to seek dental attention to take a look at this problem before admission.      BMT and Cell Therapy Informed Consent Discussion    Nik Nava is a 61 year old male with a malignancy that is currently incurable by standard chemotherapeutic approaches: ph pos ALL. To date, the only modality that offers a chance at long-term survival and potential cure is allogeneic hematopoietic stem cell transplantation.  Based upon my assessment of disease risk and comorbidities, Nik is a " suitable candidate for allogeneic HCT.      We had a detailed discussion about the rationale for transplant in this situation, requirements for proceeding, which include insurance approval, identification of an adequate source of donor hematopoetic progenitor cells, availability of a full-time caregiver along with residence within 30 minutes of the [facility] through at least day +100 post transplant, and compliance with the immunosuppression and supportive care medication regimen prescribed by providers at the [facility].      We had a alice discussion about the risks of the transplant itself, including toxicities from the preparative regimen, severe infections, the need for red blood cell and platelet transfusion support, the risk of graft rejection, the risk of acute and chronic [id number] disease, the need for immunosuppressive medications, the potential for severe organ toxicity including lungs, liver, skin, and kidneys, the possibility of long-term disability, and the risk of death due to complications of the transplant.  We discussed the risk of disease relapse despite going forward with a transplant.  Nik expressed understanding of these risks.    The patient's testing shows no evidence of infection that require continue management during the treatment procedures. I reviewed and discussed findings with the patient and the management plan during treatment is to include a matched sibling PBSCT following cyclophosphamide/fludarabine/2Gy TBI    Nik Nava had all questions answered and is ready to proceed as discussed in detail today.    In today's visit, we discussed in detail the research for which Nik Nava is eligible. We discussed the potential risks and potential benefits of each protocol individually. We explained potential alternatives to the protocols discussed. We explained to the patient that participation is voluntary and that consent may be withdrawn at any time.     The patient completed  the last round of treatment on June 20.    HCT-CI score: 3 (prior solid tumor). We counseled the patient about the impact of this on the risk of treatment related and overall mortality. The score fit within treatment protocol eligibility criteria. (Link to HCT calculator: Https://[email or web address]/calculate-online/hematology/hct-ci)    Karnofsky performance score: 90     ECOG (required for CAR-T, see KPS to ECOG conversion chart): 0  Lansky play score (pedatrics): NA    Active infections:  None.  Prior infections that require additional special prophylaxis considerations: None.  I reviewed and discussed infectious disease evaluation with the patient and the management plan during treatment.    Reproductive status: What methods of birth control does the patient plan to use during the treatment period beginning with conditioning and ending with the discontinuation of immune suppression (indicate with an X all that apply):  __ The patient is confirmed to be sterile or post-menopausal  XSexual abstinence  __ Condoms  __ Implants  __ Injectables  __ Oral contraceptives  __ Intrauterine devices (IUD)  __ Other (describe)    The patient received appropriate reproductive counseling and agreed with the need for effective contraception during the treatment procedures.      Dental health suitable to proceed: he had seen a dentist two weeks ago, but now has new pain as if something is stuck between his teeth left lower molar.  I have asked him to return for a dental eval ASAP this week.     After our detailed discussion above, the patient signed the following consents for treatment and protocols:    [id number]      The patient will receive a signed copy of the consents. The patient had opportunity to ask questions that were answered to the best of my ability and to the patient's apparent satisfaction.    DOMINIQUE BRITTON MD  July 18, 2021    Total time: 45, with greater than 50% of the time spend in direct counseling        Patient  Care Team:  Wojciech Soares as PCP - General (Medical Oncology)  Wojciech Soares as MD (Medical Oncology)  Chapo Hill MD as Assigned Cancer Care Provider  Patito eHrr RN as Specialty Care Coordinator  Yamilet Garcia MSW as           Again, thank you for allowing me to participate in the care of your patient.        Sincerely,        BMT DOM

## 2021-07-20 LAB
APPEARANCE CSF: CLEAR
COLOR CSF: COLORLESS
RBC # CSF MANUAL: 6 /UL (ref 0–2)
TUBE # CSF: 3
WBC # CSF MANUAL: 1 /UL (ref 0–5)

## 2021-07-22 LAB — COPATH REPORT: NORMAL

## 2021-07-27 ENCOUNTER — LAB (OUTPATIENT)
Dept: LAB | Facility: CLINIC | Age: 61
End: 2021-07-27
Attending: PATHOLOGY
Payer: COMMERCIAL

## 2021-07-27 DIAGNOSIS — C91.01 ACUTE LYMPHOID LEUKEMIA IN REMISSION (H): Primary | ICD-10-CM

## 2021-07-28 ENCOUNTER — LAB (OUTPATIENT)
Dept: LAB | Facility: CLINIC | Age: 61
End: 2021-07-28
Attending: INTERNAL MEDICINE
Payer: COMMERCIAL

## 2021-07-28 DIAGNOSIS — C91.01 ACUTE LYMPHOBLASTIC LEUKEMIA (ALL) IN REMISSION (H): Primary | ICD-10-CM

## 2021-07-29 ENCOUNTER — LAB (OUTPATIENT)
Dept: LAB | Facility: CLINIC | Age: 61
End: 2021-07-29
Attending: INTERNAL MEDICINE
Payer: COMMERCIAL

## 2021-07-29 DIAGNOSIS — C91.01 ACUTE LYMPHOBLASTIC LEUKEMIA (ALL) IN REMISSION (H): Primary | ICD-10-CM

## 2021-07-31 ENCOUNTER — LAB (OUTPATIENT)
Dept: URGENT CARE | Facility: URGENT CARE | Age: 61
End: 2021-07-31
Attending: INTERNAL MEDICINE
Payer: COMMERCIAL

## 2021-07-31 DIAGNOSIS — C91.01 ACUTE LYMPHOBLASTIC LEUKEMIA (ALL) IN REMISSION (H): ICD-10-CM

## 2021-07-31 PROCEDURE — U0003 INFECTIOUS AGENT DETECTION BY NUCLEIC ACID (DNA OR RNA); SEVERE ACUTE RESPIRATORY SYNDROME CORONAVIRUS 2 (SARS-COV-2) (CORONAVIRUS DISEASE [COVID-19]), AMPLIFIED PROBE TECHNIQUE, MAKING USE OF HIGH THROUGHPUT TECHNOLOGIES AS DESCRIBED BY CMS-2020-01-R: HCPCS

## 2021-07-31 PROCEDURE — U0005 INFEC AGEN DETEC AMPLI PROBE: HCPCS

## 2021-08-01 LAB — SARS-COV-2 RNA RESP QL NAA+PROBE: NEGATIVE

## 2021-08-02 ENCOUNTER — MEDICAL CORRESPONDENCE (OUTPATIENT)
Dept: TRANSPLANT | Facility: CLINIC | Age: 61
End: 2021-08-02

## 2021-08-03 ENCOUNTER — HOSPITAL ENCOUNTER (INPATIENT)
Facility: CLINIC | Age: 61
LOS: 19 days | Discharge: HOME | End: 2021-08-22
Attending: INTERNAL MEDICINE | Admitting: INTERNAL MEDICINE
Payer: COMMERCIAL

## 2021-08-03 DIAGNOSIS — C91.01 ACUTE LYMPHOBLASTIC LEUKEMIA (ALL) IN REMISSION (H): Primary | ICD-10-CM

## 2021-08-03 PROBLEM — C91.00 ALL (ACUTE LYMPHOBLASTIC LEUKEMIA) (H): Chronic | Status: ACTIVE | Noted: 2021-08-03

## 2021-08-03 PROBLEM — C91.00 ALL (ACUTE LYMPHOBLASTIC LEUKEMIA) (H): Status: ACTIVE | Noted: 2021-08-03

## 2021-08-03 LAB
ABO/RH(D): NORMAL
ALBUMIN SERPL-MCNC: 3.9 G/DL (ref 3.4–5)
ALP SERPL-CCNC: 58 U/L (ref 40–150)
ALT SERPL W P-5'-P-CCNC: 26 U/L (ref 0–70)
ANION GAP SERPL CALCULATED.3IONS-SCNC: 5 MMOL/L (ref 3–14)
ANTIBODY SCREEN: NEGATIVE
APTT PPP: 28 SECONDS (ref 22–38)
AST SERPL W P-5'-P-CCNC: 22 U/L (ref 0–45)
BASOPHILS # BLD AUTO: 0.1 10E3/UL (ref 0–0.2)
BASOPHILS NFR BLD AUTO: 1 %
BILIRUB SERPL-MCNC: 0.5 MG/DL (ref 0.2–1.3)
BUN SERPL-MCNC: 28 MG/DL (ref 7–30)
CALCIUM SERPL-MCNC: 9.5 MG/DL (ref 8.5–10.1)
CHLORIDE BLD-SCNC: 110 MMOL/L (ref 94–109)
CO2 SERPL-SCNC: 25 MMOL/L (ref 20–32)
CREAT SERPL-MCNC: 1.08 MG/DL (ref 0.66–1.25)
EOSINOPHIL # BLD AUTO: 0.1 10E3/UL (ref 0–0.7)
EOSINOPHIL NFR BLD AUTO: 1 %
ERYTHROCYTE [DISTWIDTH] IN BLOOD BY AUTOMATED COUNT: 16.3 % (ref 10–15)
GFR SERPL CREATININE-BSD FRML MDRD: 73 ML/MIN/1.73M2
GLUCOSE BLD-MCNC: 100 MG/DL (ref 70–99)
HCT VFR BLD AUTO: 33.6 % (ref 40–53)
HGB BLD-MCNC: 10.9 G/DL (ref 13.3–17.7)
IMM GRANULOCYTES # BLD: 0 10E3/UL
IMM GRANULOCYTES NFR BLD: 0 %
INR PPP: 0.92 (ref 0.85–1.15)
LYMPHOCYTES # BLD AUTO: 0.9 10E3/UL (ref 0.8–5.3)
LYMPHOCYTES NFR BLD AUTO: 8 %
MAGNESIUM SERPL-MCNC: 2.1 MG/DL (ref 1.6–2.3)
MCH RBC QN AUTO: 33.2 PG (ref 26.5–33)
MCHC RBC AUTO-ENTMCNC: 32.4 G/DL (ref 31.5–36.5)
MCV RBC AUTO: 102 FL (ref 78–100)
MONOCYTES # BLD AUTO: 0.7 10E3/UL (ref 0–1.3)
MONOCYTES NFR BLD AUTO: 6 %
NEUTROPHILS # BLD AUTO: 9.9 10E3/UL (ref 1.6–8.3)
NEUTROPHILS NFR BLD AUTO: 84 %
NRBC # BLD AUTO: 0 10E3/UL
NRBC BLD AUTO-RTO: 0 /100
PHOSPHATE SERPL-MCNC: 3.1 MG/DL (ref 2.5–4.5)
PLATELET # BLD AUTO: 294 10E3/UL (ref 150–450)
POTASSIUM BLD-SCNC: 4.1 MMOL/L (ref 3.4–5.3)
POTASSIUM BLD-SCNC: 4.1 MMOL/L (ref 3.4–5.3)
PROT SERPL-MCNC: 7.4 G/DL (ref 6.8–8.8)
RBC # BLD AUTO: 3.28 10E6/UL (ref 4.4–5.9)
SODIUM SERPL-SCNC: 140 MMOL/L (ref 133–144)
SPECIMEN EXPIRATION DATE: NORMAL
URATE SERPL-MCNC: 5.4 MG/DL (ref 3.5–7.2)
WBC # BLD AUTO: 11.6 10E3/UL (ref 4–11)

## 2021-08-03 PROCEDURE — 206N000001 HC R&B BMT UMMC

## 2021-08-03 PROCEDURE — 250N000013 HC RX MED GY IP 250 OP 250 PS 637: Performed by: PHYSICIAN ASSISTANT

## 2021-08-03 PROCEDURE — 99223 1ST HOSP IP/OBS HIGH 75: CPT | Mod: AI | Performed by: INTERNAL MEDICINE

## 2021-08-03 PROCEDURE — 258N000001 HC RX 258: Performed by: PHYSICIAN ASSISTANT

## 2021-08-03 PROCEDURE — 85730 THROMBOPLASTIN TIME PARTIAL: CPT | Performed by: PHYSICIAN ASSISTANT

## 2021-08-03 PROCEDURE — 83735 ASSAY OF MAGNESIUM: CPT | Performed by: PHYSICIAN ASSISTANT

## 2021-08-03 PROCEDURE — 80053 COMPREHEN METABOLIC PANEL: CPT | Performed by: PHYSICIAN ASSISTANT

## 2021-08-03 PROCEDURE — 84132 ASSAY OF SERUM POTASSIUM: CPT | Performed by: PHYSICIAN ASSISTANT

## 2021-08-03 PROCEDURE — 36415 COLL VENOUS BLD VENIPUNCTURE: CPT | Performed by: PHYSICIAN ASSISTANT

## 2021-08-03 PROCEDURE — 250N000013 HC RX MED GY IP 250 OP 250 PS 637: Performed by: INTERNAL MEDICINE

## 2021-08-03 PROCEDURE — 85025 COMPLETE CBC W/AUTO DIFF WBC: CPT | Performed by: PHYSICIAN ASSISTANT

## 2021-08-03 PROCEDURE — 84100 ASSAY OF PHOSPHORUS: CPT | Performed by: PHYSICIAN ASSISTANT

## 2021-08-03 PROCEDURE — 86900 BLOOD TYPING SEROLOGIC ABO: CPT | Performed by: PHYSICIAN ASSISTANT

## 2021-08-03 PROCEDURE — 84550 ASSAY OF BLOOD/URIC ACID: CPT | Performed by: PHYSICIAN ASSISTANT

## 2021-08-03 PROCEDURE — 85610 PROTHROMBIN TIME: CPT | Performed by: PHYSICIAN ASSISTANT

## 2021-08-03 RX ORDER — POLYETHYLENE GLYCOL 3350 17 G/17G
17 POWDER, FOR SOLUTION ORAL DAILY PRN
Status: DISCONTINUED | OUTPATIENT
Start: 2021-08-03 | End: 2021-08-22 | Stop reason: HOSPADM

## 2021-08-03 RX ORDER — PROCHLORPERAZINE MALEATE 5 MG
5 TABLET ORAL EVERY 6 HOURS PRN
Status: DISCONTINUED | OUTPATIENT
Start: 2021-08-03 | End: 2021-08-22 | Stop reason: HOSPADM

## 2021-08-03 RX ORDER — ACYCLOVIR 800 MG/1
800 TABLET ORAL
Status: DISCONTINUED | OUTPATIENT
Start: 2021-08-06 | End: 2021-08-22 | Stop reason: HOSPADM

## 2021-08-03 RX ORDER — ONDANSETRON 8 MG/1
8 TABLET, FILM COATED ORAL EVERY 8 HOURS
Status: DISPENSED | OUTPATIENT
Start: 2021-08-04 | End: 2021-08-11

## 2021-08-03 RX ORDER — DEXAMETHASONE 4 MG/1
8 TABLET ORAL ONCE
Status: COMPLETED | OUTPATIENT
Start: 2021-08-08 | End: 2021-08-08

## 2021-08-03 RX ORDER — PANTOPRAZOLE SODIUM 40 MG/1
40 TABLET, DELAYED RELEASE ORAL DAILY
Status: DISCONTINUED | OUTPATIENT
Start: 2021-08-03 | End: 2021-08-22 | Stop reason: HOSPADM

## 2021-08-03 RX ORDER — DEXTROSE MONOHYDRATE 50 MG/ML
10-20 INJECTION, SOLUTION INTRAVENOUS
Status: DISCONTINUED | OUTPATIENT
Start: 2021-08-15 | End: 2021-08-22

## 2021-08-03 RX ORDER — DEXAMETHASONE 4 MG/1
4 TABLET ORAL ONCE
Status: COMPLETED | OUTPATIENT
Start: 2021-08-10 | End: 2021-08-10

## 2021-08-03 RX ORDER — MEPERIDINE HYDROCHLORIDE 25 MG/ML
25-50 INJECTION INTRAMUSCULAR; INTRAVENOUS; SUBCUTANEOUS
Status: ACTIVE | OUTPATIENT
Start: 2021-08-10 | End: 2021-08-11

## 2021-08-03 RX ORDER — FUROSEMIDE 10 MG/ML
10 INJECTION INTRAMUSCULAR; INTRAVENOUS
Status: DISPENSED | OUTPATIENT
Start: 2021-08-04 | End: 2021-08-05

## 2021-08-03 RX ORDER — ACETAMINOPHEN 325 MG/1
325-650 TABLET ORAL EVERY 4 HOURS PRN
Status: DISCONTINUED | OUTPATIENT
Start: 2021-08-03 | End: 2021-08-22 | Stop reason: HOSPADM

## 2021-08-03 RX ORDER — URSODIOL 300 MG/1
300 CAPSULE ORAL 3 TIMES DAILY
Status: DISCONTINUED | OUTPATIENT
Start: 2021-08-03 | End: 2021-08-22 | Stop reason: HOSPADM

## 2021-08-03 RX ORDER — ALBUTEROL SULFATE 90 UG/1
2 AEROSOL, METERED RESPIRATORY (INHALATION)
Status: DISCONTINUED | OUTPATIENT
Start: 2021-09-07 | End: 2021-08-22 | Stop reason: HOSPADM

## 2021-08-03 RX ORDER — LEVOFLOXACIN 250 MG/1
250 TABLET, FILM COATED ORAL
Status: DISCONTINUED | OUTPATIENT
Start: 2021-08-09 | End: 2021-08-22 | Stop reason: HOSPADM

## 2021-08-03 RX ORDER — PENTAMIDINE ISETHIONATE 300 MG/300MG
300 INHALANT RESPIRATORY (INHALATION)
Status: DISCONTINUED | OUTPATIENT
Start: 2021-09-07 | End: 2021-08-22 | Stop reason: HOSPADM

## 2021-08-03 RX ORDER — LORAZEPAM 0.5 MG/1
.5-1 TABLET ORAL EVERY 4 HOURS PRN
Status: DISCONTINUED | OUTPATIENT
Start: 2021-08-03 | End: 2021-08-22 | Stop reason: HOSPADM

## 2021-08-03 RX ORDER — ACYCLOVIR 800 MG/1
800 TABLET ORAL 2 TIMES DAILY
Status: DISCONTINUED | OUTPATIENT
Start: 2021-08-10 | End: 2021-08-03

## 2021-08-03 RX ORDER — FUROSEMIDE 10 MG/ML
20 INJECTION INTRAMUSCULAR; INTRAVENOUS EVERY 8 HOURS PRN
Status: DISPENSED | OUTPATIENT
Start: 2021-08-04 | End: 2021-08-05

## 2021-08-03 RX ORDER — ALLOPURINOL 300 MG/1
300 TABLET ORAL DAILY
Status: COMPLETED | OUTPATIENT
Start: 2021-08-03 | End: 2021-08-10

## 2021-08-03 RX ORDER — DIPHENHYDRAMINE HCL 25 MG
25 CAPSULE ORAL ONCE
Status: COMPLETED | OUTPATIENT
Start: 2021-08-10 | End: 2021-08-10

## 2021-08-03 RX ORDER — LORAZEPAM 2 MG/ML
.5-1 INJECTION INTRAMUSCULAR EVERY 4 HOURS PRN
Status: DISCONTINUED | OUTPATIENT
Start: 2021-08-03 | End: 2021-08-22 | Stop reason: HOSPADM

## 2021-08-03 RX ORDER — FLUCONAZOLE 200 MG/1
200 TABLET ORAL DAILY
Status: DISCONTINUED | OUTPATIENT
Start: 2021-08-03 | End: 2021-08-22 | Stop reason: HOSPADM

## 2021-08-03 RX ORDER — ACETAMINOPHEN 325 MG/1
650 TABLET ORAL ONCE
Status: COMPLETED | OUTPATIENT
Start: 2021-08-10 | End: 2021-08-10

## 2021-08-03 RX ORDER — ACYCLOVIR 800 MG/1
800 TABLET ORAL 2 TIMES DAILY
Status: DISCONTINUED | OUTPATIENT
Start: 2021-08-24 | End: 2021-08-22 | Stop reason: HOSPADM

## 2021-08-03 RX ADMIN — Medication 5 MG: at 21:45

## 2021-08-03 RX ADMIN — LORAZEPAM 1 MG: 0.5 TABLET ORAL at 21:45

## 2021-08-03 RX ADMIN — DEXTROSE AND SODIUM CHLORIDE 1000 ML: 5; 450 INJECTION, SOLUTION INTRAVENOUS at 22:00

## 2021-08-03 RX ADMIN — ALLOPURINOL 300 MG: 300 TABLET ORAL at 15:14

## 2021-08-03 RX ADMIN — URSODIOL 300 MG: 300 CAPSULE ORAL at 20:38

## 2021-08-03 RX ADMIN — RIVAROXABAN 20 MG: 20 TABLET, FILM COATED ORAL at 18:38

## 2021-08-03 RX ADMIN — FLUCONAZOLE 200 MG: 200 TABLET ORAL at 15:14

## 2021-08-03 RX ADMIN — URSODIOL 300 MG: 300 CAPSULE ORAL at 10:57

## 2021-08-03 RX ADMIN — URSODIOL 300 MG: 300 CAPSULE ORAL at 15:14

## 2021-08-03 RX ADMIN — POLYETHYLENE GLYCOL 3350 17 G: 17 POWDER, FOR SOLUTION ORAL at 20:37

## 2021-08-03 ASSESSMENT — ACTIVITIES OF DAILY LIVING (ADL)
CONCENTRATING,_REMEMBERING_OR_MAKING_DECISIONS_DIFFICULTY: NO
ADLS_ACUITY_SCORE: 14
DIFFICULTY_EATING/SWALLOWING: NO
FALL_HISTORY_WITHIN_LAST_SIX_MONTHS: NO
TOILETING_ISSUES: NO
HEARING_DIFFICULTY_OR_DEAF: NO
ADLS_ACUITY_SCORE: 14
DIFFICULTY_COMMUNICATING: NO
DRESSING/BATHING_DIFFICULTY: NO
ADLS_ACUITY_SCORE: 14
DOING_ERRANDS_INDEPENDENTLY_DIFFICULTY: NO
PATIENT_/_FAMILY_COMMUNICATION_STYLE: SPOKEN LANGUAGE (ENGLISH OR BILINGUAL)
WALKING_OR_CLIMBING_STAIRS_DIFFICULTY: NO
WEAR_GLASSES_OR_BLIND: NO

## 2021-08-03 ASSESSMENT — MIFFLIN-ST. JEOR: SCORE: 1912.87

## 2021-08-03 NOTE — PROGRESS NOTES
SPIRITUAL HEALTH SERVICES Progress Note  I met with Nik and his spouse today. He shared that he is coping well at this time and is preparing himself for the BMT next week Tuesday. He asked for some support prior to that transplant. I let him know that we do have a ritual of a blessing prior to transplant, which he was open to doing with us while he is here. I let him know I will be in touch with the  team to arrange for a  to provide that ritual with them.       Raj Lantigua  Associate

## 2021-08-03 NOTE — CONSULTS
Assessment completed on 7/9/21 in BMT clinic, please see information below for inpatient review.    Blood and Marrow Transplant   Psychosocial Assessment with   Clinical      Assessment completed on 7/9/21 via phone as part of the COVID 19 protocol. Assessment of living situation, support system, financial status, functional status, coping, stressors, need for resources and social work intervention provided as needed. Information for this assessment was provided by pt and pt's spouse's report in addition to medical chart review and consultation with medical team.      Present at Assessment:   Patient: Nik Nava  Spouse: Lamar Nava  : MATEO Garcia LGSW     Diagnosis: Acute Lymphoblastic Leukemia (ALL)      Date of Diagnosis: 1/2021     Transplant type: Allogeneic     Donor: Related allogeneic donor stem cell transplant  Donor: Jenise Dasilva       Physician: Chapo Hill MD     Nurse Coordinator: Patito Herr RN     Social Workers: MATEO Garcia LGSW     Permanent/Local Address:   [address] [place] [id number]     Living Situation: Pt lives in a house w/ his spouse Lamar.      Local Address: Pt lives in [place], [place] (28 miles[place] [facility] which is within the required distance of the hospital. Pt does not need to relocate.      Contact Information:  Cell Phone: [phone]  Home Phone: [phone]  Wife Cell Phone: [phone]  Pt Email: [email or web address]     Presenting Information:  Nik Nava is a 61 year old male diagnosed with ALL who presents for evaluation for an allogeneic transplant at the [facility] ([facility] Pt was accompanied to today's visit by his wife Lamar.      Decision Making: Self     Health Care Directive:   Yes. Pt has a health care directive and will bring it when they return to the BMT program.      Relationship Status: . Pt and pt's spouse have been  for 43 years. Pt described relationship  as stable/supportive.      Special Needs: None identified at this time.      Family/Support System: Pt endorsed a strong support system including family and close friends who will be available to support pt throughout transplant process.      Family Information:   Spouse: Lamar Nava   Parents:   Siblings: Valentín, Mau, Jenise, & Uma (all live in the [facility] area)  Children: 3 -Son Chapo, Dtr Bouchra Fisher, & Dtr Lisa  *Pt & spouse have 13 grandchildren.     Caregiver: SW discussed with pt and pt's spouse the caregiver role and expectation at length. Pt is agreeable to having a full time caregiver for a minimum of 100 days until cleared by the BMT physician. Pt and pt's spouse confirmed understanding of the caregiver requirement. Pt's primary caregiver will be his wife Lamar with his sister Jenise & dtr Lisa as a secondary or back-up to assist as needed. Caregiver education and resources provided. No caregiver concerns identified.      Caregiver Contact Information:  Lamar Nava (wife) - Cell Phone: [phone]     Transportation Mode: Personal Vehicle. Pt is aware of driving restrictions post-BMT and the need for the caregiver is to drive until cleared to drive by the BMT physician. SW provided information on parking info and monthly parking pass options.      Insurance: Pt has BCBS health insurance. Pt denied specific insurance concerns at this time. SW reiterated information about the [employer]  should specific insurance questions arise as pt moves through transplant process. Future Insurance questions referred to [employer] - Yamilet Hall -  # [phone].     Sources of Income: Pt is supported by [employer] benefits & spouse's employment income. Pt applied for SSDI on 21 and still waiting for results. Pt denied anticipation of immediate financial hardship related to BMT at this time. However, he does feel express concerns with decrease in income and various  "non-medical expenses continue at this time. SW provided information on aziza options (discussed PARVE[employer] Ramirez Co-Pay, & [employer]) and encouraged pt to contact this SW for support should financial situation change or this wish to apply.      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 Pt is currently taking medication (Lorazapam in the evenings along w/ melatonin) and pt feels the medication is working well. Pt endorses a history of panic attacks (last one approximately 3 years ago). Pt describes anxiety for him looks like struggling with sleep and intrusive anxious thoughts. When experiencing panic attacks Pt notes he experiences chest pain and inability to sleep. Pt identifies as an \"internal processor\" and walking is helpful for him. SW explained that it's not uncommon for patients going through transplant to experience symptoms of depression/anxiety. Pt believes he would be able to identify symptoms of his depression/anxiety throughout the transplant process.      PHQ-9:  Pt scored a 5 which indicates \"mild\" on the depression severity scale. Pt endorses this is an accurate reflection of his emotional state.     GAD7:  Pt scored a 4 which indicates \"mild\" signs of anxiety on the Generalized Anxiety Disorder Questionnaire. Pt endorses this is an accurate reflection of his emotional state.     Chemical Use:   Tobacco: No current use; quit 2 years ago, smoked 40 years, and Pt utilizes nicotine gum. Denies concern continuing to abstain.   Alcohol: No current use; prior to diagnosis Pt reports avg 6-10 beers/wk. Denies concern continuing to abstain. " "  Marijuana: No hx  Other Drugs: No hx  Based on the information provided, there appear to be no specific risks or concerns identified at this time.      Trauma/Loss/Abuse History: Multiple losses associated with cancer diagnosis and treatment, including health, employment, changes to physical appearance, etc.      Spirituality: Pt identified as Uatsdin. SW explained that there are Chaplains on the unit and pt can request to meet with a  at anytime. Pt is interested in a blessing ceremony.     Coping: Pt noted that he is currently feeling \"a little anxious\". Pt shared that his main coping mechanisms are positive self-talk, walking, chewing nicotine gum, prayer/spiritual practices, reading, exercise, and taking naps. Pt identifies as an \"internal processor.\" Pt indicates he \"comes from a family of worriers.\" Pt noted that he enjoys yardwork, gardening, cars, and going to his cabin. SW and pt discussed additional positive coping mechanisms that pt can utilize while in the hospital. While hospitalized, pt plans to bring his IPAD, listen to music, crossword books, and read magazines.      Caregiver Coping: Pt's wife noted that she is feeling \"a little overwhelmed\" following the RNCC education. Pt's wife noted that she mireya by talking with her family/friends, prayer/spiritual practices, thinking positively, and focusing on what she can control. She endorses that it is helpful for her to try to focus on the present rather than worry about the next chapter in Pt's treatment.      Education Provided: Transplant process expectations, Caregiver requirements, Caregiver self-care, Financial issues related to transplant, Financial resources/grants available, Common psychosocial stressors pre/post transplant, Support group(s) available, Hospital resources available, Web site information, Social Work role and Resources for children/siblings  Provided:  -[employer] Application  -[employer] Application  -[employer] " Co-Pay Assistance Information  -[employer] Peer Connect  -[employer] Counseling Services  -Cancer Legal Care  -Cancer Care Support Groups  -Current Visitor Policy Handout     Interventions Provided: Psychosocial Support and Education      Assessment and Recommendations for Team:  Pt is a 61 year old male diagnosed with ALL who is here undergoing preparation for a planned allogeneic transplant.      Pt is pleasant, calm and able to articulate concerns/coping mechanisms in an appropriate manner. Pt utilized humor throughout CSW visit and communicated thoughtful insight to his psychosocial needs. During our meeting pt was alert, he was interactive, affect was full, he displayed appropriate memory and thought processes. Pt feels comfortable communicating with the medical team. Pt has a strong supportive network of family and friends who are involved. Pt has developed strong coping mechanisms.      Pt has a strong support system and a confirmed caregiver plan. Pt verbalizes understanding of the transplant process and wanting to proceed. SW provided contact information and encouraged pt to contact SW with questions, concerns, resources and for support.     Per this assessment, SW did not identify any barriers to this patient moving forward with transplant.     Important Information:   - would like treadmill while IP.    - would like to bring his own brand of nicotine gum while IP and will discuss with nursing upon admission.    -hx of anxiety/panic attacks - nicotine gum and walking is helpful for Pt.      Follow up Planned:   Psychosocial support  Healthcare Directive     MATEO Garcia, [facility]  Adult Blood & Marrow Transplant   Phone: [phone]  Pager: [phone]

## 2021-08-03 NOTE — PROGRESS NOTES
Patient admitted to:  0933  Admitted from: home  Arrived by: private vehicle  Reason for admission: BMT  Patient accompanied by: wife Lamar  Belongings: with pt in room  Teaching: educated pt on unit routine, mouth cares, calendar, and plan of care  Skin double check completed by: Sehlli Brooke RN, all skin intact.

## 2021-08-03 NOTE — CONSULTS
INTERVENTIONAL RADIOLOGY CONSULT    Nik Nava is a 61 year old male with ALL admitted for chemotherapy prior to transplant. IR consulted for double lumen tunneled central venous catheter placement (non-apheresis). Patient has right IJ chest port for access currently. Will place tunneled line on the left. 9.5 Fr Mcknight recommended - does not need to be apheresis capable.    Patient is on IR schedtule tomorrow, 8/4 for a tunneled central venous catheter placement. Labs WNL for procedure. Orders for NPO, scrubs and antibiotics have been entered. Consent will be done prior to procedure. Doesn't need to hold Xarelto, NPO for 8 hours for sedation.    Discussed with Lashanda Figueroa PA-C.    Salvador Reynoso PA-C  Interventional Radiology  [phone] (IR pager)

## 2021-08-03 NOTE — H&P
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  He initially was treated with dasatinib and dexamethasone which led to significant improvement in his disease and then was referred to [facility].  Further induction was recommended and he returned to receive 2 cycles of vincristine, prednisone, daunorubicin, and pegasparaginase with intrathecal methotrexate.  This treatment was complicated by a pulmonary embolus leading to anticoagulation.  He also developed mild pancreatitis that led to the hospital stay.  Repeat marrow showed that his blast dropped to 1%.  He then received 1 course of high-dose mele-C.  He now has undergone a pretransplant evaluation and a bone marrow aspirate and biopsy with FISH, flow cytometry for MRD, and cytogenetics all demonstrate complete remission without detection of minimal residual disease.    He is admitted today for SCT.  He feels good.  No recent illness, sick contacts or fever.  He is ready to proceed.    Bone Marrow Workup Results:  Blood Counts Recent Labs   Lab Test 07/08/21  1055   WBC 8.9   ANEU 7.2   ALYM 1.0   BOSTON 0.6   AEOS 0.0   HGB 8.5*   HCT 25.9*   *      Bone Marrow Final Diagnosis   Bone marrow, posterior iliac crest, decalcified trephine biopsy, aspirate clot, touch imprints, aspirate smears, and peripheral blood (, obtained  "06/28/2021):       Normocellular to slightly hypercellular marrow (cellularity estimated at 40-50%) with trilineage hematopoiesis with mild myeloid predominance, 1% blasts  No morphologic or immunophenotypic evidence for B lymphoblastic leukemia/lymphoma  See comment     Peripheral blood showing moderate macrocytic, normochromic anemia (8.8 g/dL, 103 fL) with increased red cell regeneration, Leukocytosis with slight neutrophilia (WBC 18 point 1K per microliter, ANC 12.8) rare circulating blasts (1%), monocytosis, thrombocytosis (939K per microliter)   Electronically signed by Fiorella Boland MD on 7/14/2021 at  6:24 PM   Comment    The significance of rare circulating blasts is uncertain in the context of leukocytosis with neutrophilia, correlation with history of prior chemotherapy and growth factor treatment is required.  As well, correlation with clinical history of potential other comorbidities around the time of the biopsy is recommended.  A general note and comment about the diagnosis of BCR-ABL1 positive B-ALL:  the patient was diagnosed with B-ALL with BCR-ABL1 translocation, which is the \"default\" diagnosis when  patient present with B lymphoblasts and the karyotype is positive for BCR-ABL1, however there is always a possibility that this could be B lymphoblastic blast crisis of underlying CML which has never been diagnosed. This differential is hardly possible to resolve at the time of the diagnosis using clinically available tools. Follow-up cases sometimes show persistence of features of CML without B lymphoblasts suggesting underlying CML. We do not have any reason to suspect this in this case and  do not know if this could be the case in this patient . This is a general comment to mention this as a possibility if there was a discrepancy between high sensitivity BCR-ABL1 testing  results and flow cytometry findings.   By report flow cytometry was performed and showed no qualitatively abnormal " immunophenotypic blast population.  By report conventional karyotyping showed 46, XY [20], by report FISH revealed no BCR-ABL 1 translocation.  Please correlate with results of PCR for BCR-ABL1.      MRD by MP flow cytometry and FISH negative ([facility] - see outside report thru [facility])   Blood Type Recent Labs   Lab Test 07/08/21  1056   ABO O      Chemistries Recent Labs   Lab Test 07/08/21  1055      POTASSIUM 3.9   CHLORIDE 108   CO2 23   BUN 24   CR 0.94      Liver Tests Recent Labs   Lab Test 07/08/21  1055   BILITOTAL 0.3   ALKPHOS 60   AST 22   ALT 28      Chest X-Ray:  7/12/21 Impression: No acute cardiopulmonary process.     Chest CT  7/12/21 IMPRESSION:   1. No focal airspace opacity. No suspicious or enlarged pulmonary  nodules.  2. Additional incidental findings as described above report including  stable right nephrectomy changes and left renal cyst.    PFTs FVC%  Recent Labs   Lab Test 07/12/21  1125   [id number] 101       FEV1%  Recent Labs   Lab Test 07/12/21  1125   [id number] 98       DLCO%  Recent Labs   Lab Test 07/12/21  1125   [id number] 141      ECHO or MUGA: Procedure  Echocardiogram with two-dimensional, color and spectral Doppler performed.  ______________________________________________________________________________  Interpretation Summary  Global and regional left ventricular function is normal with an EF of 55-60%.  3D LVEF volumetric analysis is 59%.  Global peak LV longitudinal strain is averaged at -20%. This is normal (normal  <-18%).  Global right ventricular function is normal. The right ventricle is normal  size.  No significant valvular abnormalities.  IVC diameter <2.1 cm collapsing >50% with sniff suggests a normal RA pressure  of 3 mmHg.  There is no prior study for direct comparison.     EKG NSR   Serologies: CMV pos, EBV pos, HSV pos     Vitamin D No results for input(s): VITDT in the last 40349 hours.       Family History:   Family History   Problem Relation Age of Onset     Lung  Cancer Mother      Polycythemia Father      Coronary Artery Disease Maternal Grandmother        Social History:   Social History     Socioeconomic History     Marital status:      Spouse name: Not on file     Number of children: Not on file     Years of education: Not on file     Highest education level: Not on file   Occupational History     Not on file   Tobacco Use     Smoking status: Former Smoker     Packs/day: 2.00     Years: 30.00     Pack years: 60.00     Quit date: 2019     Years since quittin.2     Smokeless tobacco: Never Used   Substance and Sexual Activity     Alcohol use: Not Currently     Drug use: Never     Sexual activity: Not on file   Other Topics Concern     Parent/sibling w/ CABG, MI or angioplasty before 65F 55M? Not Asked   Social History Narrative     Not on file     Social Determinants of Health     Financial Resource Strain:      Difficulty of Paying Living Expenses:    Food Insecurity:      Worried About Running Out of Food in the Last Year:      Ran Out of Food in the Last Year:    Transportation Needs:      Lack of Transportation (Medical):      Lack of Transportation (Non-Medical):    Physical Activity:      Days of Exercise per Week:      Minutes of Exercise per Session:    Stress:      Feeling of Stress :    Social Connections:      Frequency of Communication with Friends and Family:      Frequency of Social Gatherings with Friends and Family:      Attends [employer]ism Services:      Active Member of Clubs or Organizations:      Attends Club or Organization Meetings:      Marital Status:    Intimate Partner Violence:      Fear of Current or Ex-Partner:      Emotionally Abused:      Physically Abused:      Sexually Abused:        Past Medical History:   Past Medical History:   Diagnosis Date     Cancer (H)     renal cell     CKD (chronic kidney disease)      Thyroid disease         Past Surgical History:   Past Surgical History:   Procedure Laterality Date     BACK SURGERY   2017       Allergies:   Allergies   Allergen Reactions     Sulfamethoxazole W/Trimethoprim Rash       Home Medications      Prior to Admission medications    Medication Sig Start Date End Date Taking? Authorizing Provider   acetaminophen (TYLENOL) 500 MG tablet Take 500-1,000 mg by mouth every 6 hours as needed for mild pain     Reported, Patient   dasatinib (SPRYCEL) 140 MG tablet Take 140 mg by mouth daily 1/15/21   Reported, Patient   furosemide (LASIX) 40 MG tablet furosemide 40 mg tablet  Patient not taking: No sig reported 3/25/21   Reported, Patient   levothyroxine (SYNTHROID/LEVOTHROID) 200 MCG tablet Take 200 mcg by mouth daily  6/6/21   Reported, Patient   LORazepam (ATIVAN) 1 MG tablet Take 1 mg by mouth nightly as needed for anxiety or sleep  1/13/21   Reported, Patient   magnesium hydroxide (MILK OF MAGNESIA) 400 MG/5ML suspension Take 30 mLs by mouth daily as needed for heartburn     Reported, Patient   melatonin 5 MG CAPS Take 5 mg by mouth At Bedtime  1/7/21   Reported, Patient   Metaxalone 400 MG TABS Take 800 mg by mouth as needed Has not started yet  Patient not taking: Reported on 7/13/2021 1/8/21   Reported, Patient   nicotine polacrilex (NICORETTE) 4 MG gum Take 4 mg by mouth as needed for smoking cessation     Reported, Patient   Nutritional Supplements (ENSURE HIGH PROTEIN) Take 1 Can by mouth 2 times daily     Reported, Patient   omeprazole (PRILOSEC) 40 MG DR capsule Take 40 mg by mouth daily 5/11/21   Reported, Patient   ondansetron (ZOFRAN) 8 MG tablet ondansetron HCl 8 mg tablet  Patient not taking: No sig reported 3/4/21   Reported, Patient   oxyCODONE (ROXICODONE) 5 MG tablet TAKE 1 TO 2 TABLETS BY MOUTH EVERY 4 HOURS AS NEEDED FOR CANCER RELATED PAIN  Patient not taking: Reported on 7/13/2021 6/14/21   Reported, Patient   polyethylene glycol (MIRALAX) 17 GM/Dose powder Take 17 g by mouth daily as needed for constipation     Reported, Patient   predniSONE (DELTASONE) 10 MG tablet Take  "10 tablets by mouth daily 6/24/21   Reported, Patient   prochlorperazine (COMPAZINE) 10 MG tablet  3/4/21   Reported, Patient   senna (SENOKOT) 8.6 MG tablet Take 17.2 mg by mouth  Patient not taking: Reported on 7/13/2021    Reported, Patient   XARELTO ANTICOAGULANT 20 MG TABS tablet Take 20 mg by mouth daily  7/7/21   Reported, Patient       Review of Systems    A 10 point review of systems was negative other than noted above.     PHYSICAL EXAM      Weight     Wt Readings from Last 3 Encounters:   08/03/21 107.6 kg (237 lb 3.4 oz)   07/19/21 108 kg (238 lb)   07/13/21 108 kg (238 lb)          BP (!) 164/87 (BP Location: Left arm)   Pulse 74   Temp 97.9  F (36.6  C) (Axillary)   Resp 16   Ht 1.835 m (6' 0.24\")   Wt 107.6 kg (237 lb 3.4 oz)   SpO2 98%   BMI 31.96 kg/m       KPS: 90    General: NAD   Eyes: YARELIS, sclera anicteric   Nose/Mouth/Throat: OP clear, buccal mucosa moist, no ulcerations   Lungs: CTA bilaterally  Cardiovascular: RRR, no M/R/G   Abdominal/Rectal: +BS, soft, NT, ND, No HSM   Lymphatics: No edema  Skin: No rashes or petechaie  Neuro: A&O   Vascular Access: port in the right chest    OVERALL ASSESSMENT AND PLAN   Nik Nava is a 61 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  Acute lymphoblastic leukemia, [place] chromosome positive in CR, MRD neg.  Here for flu/cy/TBI and sib allo sct  HCT-CI score: 3 (prior solid tumor)  - allopurinol through day 0  - GCSF starts on day +5 until ANC >2500 x 2    2.  Recent pulmonary emboli-he developed a pulmonary emboli in the setting of receiving PEG asparaginase.  He is currently receiving anticoagulation.  xarelto resumed and does not need to be held for line placement.  Will continue until plt count < 50k.     3.  History of kidney cancer-he has a history of renal cancer and has a prior " resection.  For that reason he has a single unilateral kidney.     4.  Dental clearance:  Ok to proceed    5.  GVHD:  Tac/MMF to to start on day -3; tac level on day -1    6.  ID:  prophy acv/letermovir, fluconazole, levaquin  - sulfa allergic; pentamidine at day +28    7.  Heme:  Transfuse for hgb <7g/dL; plt < 10k    8. Venous access:  IR will perform CVC placement tomorrow.  NPO at midnight for procedure.  Of note, pt dose not need to hold xarelto for line placement (per Salvador Reynoso, IR).    9. Hx of graves disease; On synthroid      Consents are given to pt in written form.      Lashanda Figueroa pa-c  [phone]

## 2021-08-03 NOTE — PHARMACY-ADMISSION MEDICATION HISTORY
Admission Medication History Completed by Pharmacy    See [facility] Admission Navigator for allergy information, preferred outpatient pharmacy, prior to admission medications and immunization status.     Medication History Sources:     PharmD csc appt.     Changes made to PTA medication list (reason):    Added: None    Deleted: None    Changed: None    Additional Information:    None    Prior to Admission medications    Medication Sig Last Dose Taking? Auth Provider   dasatinib (SPRYCEL) 140 MG tablet Take 140 mg by mouth daily Past Week at Unknown time Yes Reported, Patient   furosemide (LASIX) 40 MG tablet furosemide 40 mg tablet Past Month at Unknown time Yes Reported, Patient   levothyroxine (SYNTHROID/LEVOTHROID) 200 MCG tablet Take 200 mcg by mouth daily  8/3/2021 at Unknown time Yes Reported, Patient   LORazepam (ATIVAN) 1 MG tablet Take 1 mg by mouth nightly as needed for anxiety or sleep  8/2/2021 at Unknown time Yes Reported, Patient   magnesium hydroxide (MILK OF MAGNESIA) 400 MG/5ML suspension Take 30 mLs by mouth daily as needed for heartburn  Past Month at Unknown time Yes Reported, Patient   melatonin 5 MG CAPS Take 5 mg by mouth At Bedtime  8/2/2021 at Unknown time Yes Reported, Patient   nicotine polacrilex (NICORETTE) 4 MG gum Take 4 mg by mouth as needed for smoking cessation  8/2/2021 at Unknown time Yes Reported, Patient   Nutritional Supplements (ENSURE HIGH PROTEIN) Take 1 Can by mouth 2 times daily  8/2/2021 at Unknown time Yes Reported, Patient   omeprazole (PRILOSEC) 40 MG DR capsule Take 40 mg by mouth daily 8/3/2021 at Unknown time Yes Reported, Patient   predniSONE (DELTASONE) 10 MG tablet Take 10 tablets by mouth daily 8/3/2021 at Unknown time Yes Reported, Patient   prochlorperazine (COMPAZINE) 10 MG tablet  Past Month at Unknown time Yes Reported, Patient   senna (SENOKOT) 8.6 MG tablet Take 17.2 mg by mouth  Past Month at Unknown time Yes Reported, Patient   XARELTO ANTICOAGULANT 20 MG  TABS tablet Take 20 mg by mouth daily  Past Week at Unknown time Yes Reported, Patient   acetaminophen (TYLENOL) 500 MG tablet Take 500-1,000 mg by mouth every 6 hours as needed for mild pain  More than a month at Unknown time  Reported, Patient   Metaxalone 400 MG TABS Take 800 mg by mouth as needed Has not started yet  Patient not taking: Reported on 7/13/2021 More than a month at Unknown time  Reported, Patient   ondansetron (ZOFRAN) 8 MG tablet ondansetron HCl 8 mg tablet  Patient not taking: No sig reported More than a month at Unknown time  Reported, Patient   oxyCODONE (ROXICODONE) 5 MG tablet TAKE 1 TO 2 TABLETS BY MOUTH EVERY 4 HOURS AS NEEDED FOR CANCER RELATED PAIN  Patient not taking: Reported on 7/13/2021 More than a month at Unknown time  Reported, Patient   polyethylene glycol (MIRALAX) 17 GM/Dose powder Take 17 g by mouth daily as needed for constipation  More than a month at Unknown time  Reported, Patient       Date completed: 08/03/21    Medication history completed by: Ceasar Altman [facility]

## 2021-08-03 NOTE — PLAN OF CARE
"AVSS on room air with exception of hypertension. IR unable to place CVC today, pt will be on call for line placement tomorrow, NPO at midnight. Pt will have fludarabine and cytoxan tomorrow morning. Will start cytoxan flush tonight. Pt would like miralax tonight as he is concerned about his \"nervous colon\" making him constipated. Pt also likes ativan and melatonin at bedtime. Independent in room and halls.     Problem: Adult Inpatient Plan of Care  Goal: Plan of Care Review  Outcome: No Change  Flowsheets (Taken 8/3/2021 1832)  Plan of Care Reviewed With:   patient   spouse  Progress: no change  Goal: Absence of Hospital-Acquired Illness or Injury  Intervention: Identify and Manage Fall Risk  Recent Flowsheet Documentation  Taken 8/3/2021 0900 by Malinda Davis, RN  Safety Promotion/Fall Prevention:   increase visualization of patient   lighting adjusted   increased rounding and observation  Intervention: Prevent Skin Injury  Recent Flowsheet Documentation  Taken 8/3/2021 0900 by Malinda Davis, RN  Body Position: position changed independently  Goal: Readiness for Transition of Care  Intervention: Mutually Develop Transition Plan  Recent Flowsheet Documentation  Taken 8/3/2021 0800 by Malinda Davis, RN  Equipment Currently Used at Home: none     "

## 2021-08-04 ENCOUNTER — APPOINTMENT (OUTPATIENT)
Dept: OCCUPATIONAL THERAPY | Facility: CLINIC | Age: 61
End: 2021-08-04
Attending: PHYSICIAN ASSISTANT
Payer: COMMERCIAL

## 2021-08-04 ENCOUNTER — HOME INFUSION (PRE-WILLOW HOME INFUSION) (OUTPATIENT)
Dept: PHARMACY | Facility: CLINIC | Age: 61
End: 2021-08-04

## 2021-08-04 ENCOUNTER — APPOINTMENT (OUTPATIENT)
Dept: INTERVENTIONAL RADIOLOGY/VASCULAR | Facility: CLINIC | Age: 61
End: 2021-08-04
Attending: PHYSICIAN ASSISTANT
Payer: COMMERCIAL

## 2021-08-04 LAB
ANION GAP SERPL CALCULATED.3IONS-SCNC: 6 MMOL/L (ref 3–14)
BASOPHILS # BLD AUTO: 0.1 10E3/UL (ref 0–0.2)
BASOPHILS NFR BLD AUTO: 1 %
BUN SERPL-MCNC: 19 MG/DL (ref 7–30)
CALCIUM SERPL-MCNC: 8.9 MG/DL (ref 8.5–10.1)
CHLORIDE BLD-SCNC: 108 MMOL/L (ref 94–109)
CMV DNA SPEC NAA+PROBE-ACNC: NOT DETECTED IU/ML
CO2 SERPL-SCNC: 26 MMOL/L (ref 20–32)
CREAT SERPL-MCNC: 0.86 MG/DL (ref 0.66–1.25)
EOSINOPHIL # BLD AUTO: 0.2 10E3/UL (ref 0–0.7)
EOSINOPHIL NFR BLD AUTO: 3 %
ERYTHROCYTE [DISTWIDTH] IN BLOOD BY AUTOMATED COUNT: 16.1 % (ref 10–15)
GFR SERPL CREATININE-BSD FRML MDRD: >90 ML/MIN/1.73M2
GLUCOSE BLD-MCNC: 136 MG/DL (ref 70–99)
HCT VFR BLD AUTO: 29.9 % (ref 40–53)
HGB BLD-MCNC: 9.7 G/DL (ref 13.3–17.7)
IMM GRANULOCYTES # BLD: 0 10E3/UL
IMM GRANULOCYTES NFR BLD: 0 %
LYMPHOCYTES # BLD AUTO: 1.3 10E3/UL (ref 0.8–5.3)
LYMPHOCYTES NFR BLD AUTO: 24 %
MAGNESIUM SERPL-MCNC: 2.1 MG/DL (ref 1.6–2.3)
MCH RBC QN AUTO: 33 PG (ref 26.5–33)
MCHC RBC AUTO-ENTMCNC: 32.4 G/DL (ref 31.5–36.5)
MCV RBC AUTO: 102 FL (ref 78–100)
MONOCYTES # BLD AUTO: 0.6 10E3/UL (ref 0–1.3)
MONOCYTES NFR BLD AUTO: 11 %
NEUTROPHILS # BLD AUTO: 3.2 10E3/UL (ref 1.6–8.3)
NEUTROPHILS NFR BLD AUTO: 61 %
NRBC # BLD AUTO: 0 10E3/UL
NRBC BLD AUTO-RTO: 0 /100
PLATELET # BLD AUTO: 257 10E3/UL (ref 150–450)
POTASSIUM BLD-SCNC: 3.5 MMOL/L (ref 3.4–5.3)
POTASSIUM BLD-SCNC: 4 MMOL/L (ref 3.4–5.3)
RBC # BLD AUTO: 2.94 10E6/UL (ref 4.4–5.9)
SODIUM SERPL-SCNC: 134 MMOL/L (ref 133–144)
SODIUM SERPL-SCNC: 134 MMOL/L (ref 133–144)
SODIUM SERPL-SCNC: 140 MMOL/L (ref 133–144)
WBC # BLD AUTO: 5.4 10E3/UL (ref 4–11)

## 2021-08-04 PROCEDURE — 77001 FLUOROGUIDE FOR VEIN DEVICE: CPT | Mod: 26 | Performed by: PHYSICIAN ASSISTANT

## 2021-08-04 PROCEDURE — 99152 MOD SED SAME PHYS/QHP 5/>YRS: CPT | Performed by: PHYSICIAN ASSISTANT

## 2021-08-04 PROCEDURE — 80048 BASIC METABOLIC PNL TOTAL CA: CPT | Performed by: PHYSICIAN ASSISTANT

## 2021-08-04 PROCEDURE — 250N000011 HC RX IP 250 OP 636: Performed by: INTERNAL MEDICINE

## 2021-08-04 PROCEDURE — 258N000003 HC RX IP 258 OP 636: Performed by: INTERNAL MEDICINE

## 2021-08-04 PROCEDURE — 250N000009 HC RX 250: Performed by: PHYSICIAN ASSISTANT

## 2021-08-04 PROCEDURE — 250N000013 HC RX MED GY IP 250 OP 250 PS 637: Performed by: INTERNAL MEDICINE

## 2021-08-04 PROCEDURE — 258N000001 HC RX 258: Performed by: PHYSICIAN ASSISTANT

## 2021-08-04 PROCEDURE — 250N000012 HC RX MED GY IP 250 OP 636 PS 637: Performed by: PHYSICIAN ASSISTANT

## 2021-08-04 PROCEDURE — 02H633Z INSERTION OF INFUSION DEVICE INTO RIGHT ATRIUM, PERCUTANEOUS APPROACH: ICD-10-PCS | Performed by: PHYSICIAN ASSISTANT

## 2021-08-04 PROCEDURE — 97530 THERAPEUTIC ACTIVITIES: CPT | Mod: GO

## 2021-08-04 PROCEDURE — 84295 ASSAY OF SERUM SODIUM: CPT | Performed by: INTERNAL MEDICINE

## 2021-08-04 PROCEDURE — 97110 THERAPEUTIC EXERCISES: CPT | Mod: GO

## 2021-08-04 PROCEDURE — 84132 ASSAY OF SERUM POTASSIUM: CPT | Performed by: INTERNAL MEDICINE

## 2021-08-04 PROCEDURE — 36558 INSERT TUNNELED CV CATH: CPT | Performed by: PHYSICIAN ASSISTANT

## 2021-08-04 PROCEDURE — 99152 MOD SED SAME PHYS/QHP 5/>YRS: CPT

## 2021-08-04 PROCEDURE — 250N000011 HC RX IP 250 OP 636: Performed by: PHYSICIAN ASSISTANT

## 2021-08-04 PROCEDURE — 83735 ASSAY OF MAGNESIUM: CPT | Performed by: PHYSICIAN ASSISTANT

## 2021-08-04 PROCEDURE — 258N000001 HC RX 258: Performed by: INTERNAL MEDICINE

## 2021-08-04 PROCEDURE — 250N000011 HC RX IP 250 OP 636: Performed by: NURSE PRACTITIONER

## 2021-08-04 PROCEDURE — 272N000504 HC NEEDLE CR4

## 2021-08-04 PROCEDURE — 99233 SBSQ HOSP IP/OBS HIGH 50: CPT | Performed by: INTERNAL MEDICINE

## 2021-08-04 PROCEDURE — 97165 OT EVAL LOW COMPLEX 30 MIN: CPT | Mod: GO

## 2021-08-04 PROCEDURE — 250N000013 HC RX MED GY IP 250 OP 250 PS 637: Performed by: PHYSICIAN ASSISTANT

## 2021-08-04 PROCEDURE — C1751 CATH, INF, PER/CENT/MIDLINE: HCPCS

## 2021-08-04 PROCEDURE — 250N000011 HC RX IP 250 OP 636: Performed by: RADIOLOGY

## 2021-08-04 PROCEDURE — 85025 COMPLETE CBC W/AUTO DIFF WBC: CPT | Performed by: PHYSICIAN ASSISTANT

## 2021-08-04 PROCEDURE — 84295 ASSAY OF SERUM SODIUM: CPT | Performed by: STUDENT IN AN ORGANIZED HEALTH CARE EDUCATION/TRAINING PROGRAM

## 2021-08-04 PROCEDURE — 250N000013 HC RX MED GY IP 250 OP 250 PS 637: Performed by: NURSE PRACTITIONER

## 2021-08-04 PROCEDURE — 76937 US GUIDE VASCULAR ACCESS: CPT

## 2021-08-04 PROCEDURE — 77001 FLUOROGUIDE FOR VEIN DEVICE: CPT

## 2021-08-04 PROCEDURE — 272N000602 HC WOUND GLUE CR1

## 2021-08-04 PROCEDURE — 206N000001 HC R&B BMT UMMC

## 2021-08-04 PROCEDURE — 258N000003 HC RX IP 258 OP 636: Performed by: STUDENT IN AN ORGANIZED HEALTH CARE EDUCATION/TRAINING PROGRAM

## 2021-08-04 PROCEDURE — 0JH60XZ INSERTION OF TUNNELED VASCULAR ACCESS DEVICE INTO CHEST SUBCUTANEOUS TISSUE AND FASCIA, OPEN APPROACH: ICD-10-PCS | Performed by: PHYSICIAN ASSISTANT

## 2021-08-04 PROCEDURE — C1769 GUIDE WIRE: HCPCS

## 2021-08-04 PROCEDURE — 3E04305 INTRODUCTION OF OTHER ANTINEOPLASTIC INTO CENTRAL VEIN, PERCUTANEOUS APPROACH: ICD-10-PCS | Performed by: INTERNAL MEDICINE

## 2021-08-04 PROCEDURE — 76937 US GUIDE VASCULAR ACCESS: CPT | Mod: 26 | Performed by: PHYSICIAN ASSISTANT

## 2021-08-04 RX ORDER — SODIUM CHLORIDE 9 MG/ML
INJECTION, SOLUTION INTRAVENOUS CONTINUOUS
Status: DISCONTINUED | OUTPATIENT
Start: 2021-08-04 | End: 2021-08-07

## 2021-08-04 RX ORDER — FENTANYL CITRATE 50 UG/ML
25-50 INJECTION, SOLUTION INTRAMUSCULAR; INTRAVENOUS EVERY 5 MIN PRN
Status: DISCONTINUED | OUTPATIENT
Start: 2021-08-04 | End: 2021-08-22 | Stop reason: HOSPADM

## 2021-08-04 RX ORDER — CEFAZOLIN SODIUM 2 G/100ML
2 INJECTION, SOLUTION INTRAVENOUS
Status: COMPLETED | OUTPATIENT
Start: 2021-08-04 | End: 2021-08-04

## 2021-08-04 RX ORDER — NALOXONE HYDROCHLORIDE 0.4 MG/ML
0.2 INJECTION, SOLUTION INTRAMUSCULAR; INTRAVENOUS; SUBCUTANEOUS
Status: DISCONTINUED | OUTPATIENT
Start: 2021-08-04 | End: 2021-08-22 | Stop reason: HOSPADM

## 2021-08-04 RX ORDER — HEPARIN SODIUM (PORCINE) LOCK FLUSH IV SOLN 100 UNIT/ML 100 UNIT/ML
5-10 SOLUTION INTRAVENOUS
Status: DISCONTINUED | OUTPATIENT
Start: 2021-08-04 | End: 2021-08-22 | Stop reason: HOSPADM

## 2021-08-04 RX ORDER — HEPARIN SODIUM,PORCINE 10 UNIT/ML
5-10 VIAL (ML) INTRAVENOUS
Status: DISCONTINUED | OUTPATIENT
Start: 2021-08-04 | End: 2021-08-22 | Stop reason: HOSPADM

## 2021-08-04 RX ORDER — HEPARIN SODIUM,PORCINE 10 UNIT/ML
5 VIAL (ML) INTRAVENOUS
Status: COMPLETED | OUTPATIENT
Start: 2021-08-04 | End: 2021-08-04

## 2021-08-04 RX ORDER — NALOXONE HYDROCHLORIDE 0.4 MG/ML
0.4 INJECTION, SOLUTION INTRAMUSCULAR; INTRAVENOUS; SUBCUTANEOUS
Status: DISCONTINUED | OUTPATIENT
Start: 2021-08-04 | End: 2021-08-22 | Stop reason: HOSPADM

## 2021-08-04 RX ORDER — FLUMAZENIL 0.1 MG/ML
0.2 INJECTION, SOLUTION INTRAVENOUS
Status: DISCONTINUED | OUTPATIENT
Start: 2021-08-04 | End: 2021-08-22 | Stop reason: HOSPADM

## 2021-08-04 RX ORDER — HEPARIN SODIUM,PORCINE 10 UNIT/ML
5-10 VIAL (ML) INTRAVENOUS EVERY 24 HOURS
Status: DISCONTINUED | OUTPATIENT
Start: 2021-08-04 | End: 2021-08-22 | Stop reason: HOSPADM

## 2021-08-04 RX ORDER — LIDOCAINE HYDROCHLORIDE 10 MG/ML
1-30 INJECTION, SOLUTION EPIDURAL; INFILTRATION; INTRACAUDAL; PERINEURAL
Status: COMPLETED | OUTPATIENT
Start: 2021-08-04 | End: 2021-08-04

## 2021-08-04 RX ADMIN — Medication 5 ML: at 14:22

## 2021-08-04 RX ADMIN — DEXAMETHASONE 10 MG: 2 TABLET ORAL at 09:34

## 2021-08-04 RX ADMIN — DEXTROSE AND SODIUM CHLORIDE 1000 ML: 5; 450 INJECTION, SOLUTION INTRAVENOUS at 05:44

## 2021-08-04 RX ADMIN — LEVOTHYROXINE SODIUM 200 MCG: 0.05 TABLET ORAL at 09:38

## 2021-08-04 RX ADMIN — FENTANYL CITRATE 25 MCG: 50 INJECTION INTRAMUSCULAR; INTRAVENOUS at 08:45

## 2021-08-04 RX ADMIN — MESNA 5380 MG: 100 INJECTION, SOLUTION INTRAVENOUS at 09:27

## 2021-08-04 RX ADMIN — CYCLOPHOSPHAMIDE 5380 MG: 2 INJECTION, POWDER, FOR SOLUTION INTRAVENOUS; ORAL at 11:25

## 2021-08-04 RX ADMIN — MIDAZOLAM 1 MG: 1 INJECTION INTRAMUSCULAR; INTRAVENOUS at 08:38

## 2021-08-04 RX ADMIN — SODIUM CHLORIDE: 9 INJECTION, SOLUTION INTRAVENOUS at 23:22

## 2021-08-04 RX ADMIN — FENTANYL CITRATE 25 MCG: 50 INJECTION INTRAMUSCULAR; INTRAVENOUS at 08:50

## 2021-08-04 RX ADMIN — DEXTROSE AND SODIUM CHLORIDE 1000 ML: 5; 450 INJECTION, SOLUTION INTRAVENOUS at 14:22

## 2021-08-04 RX ADMIN — LIDOCAINE HYDROCHLORIDE 10 ML: 10 INJECTION, SOLUTION EPIDURAL; INFILTRATION; INTRACAUDAL; PERINEURAL at 08:47

## 2021-08-04 RX ADMIN — MIDAZOLAM 0.5 MG: 1 INJECTION INTRAMUSCULAR; INTRAVENOUS at 08:51

## 2021-08-04 RX ADMIN — SODIUM CHLORIDE: 9 INJECTION, SOLUTION INTRAVENOUS at 17:45

## 2021-08-04 RX ADMIN — LORAZEPAM 1 MG: 0.5 TABLET ORAL at 21:33

## 2021-08-04 RX ADMIN — ONDANSETRON HYDROCHLORIDE 8 MG: 8 TABLET, FILM COATED ORAL at 09:34

## 2021-08-04 RX ADMIN — URSODIOL 300 MG: 300 CAPSULE ORAL at 20:58

## 2021-08-04 RX ADMIN — MIDAZOLAM 0.5 MG: 1 INJECTION INTRAMUSCULAR; INTRAVENOUS at 08:45

## 2021-08-04 RX ADMIN — FENTANYL CITRATE 50 MCG: 50 INJECTION INTRAMUSCULAR; INTRAVENOUS at 08:38

## 2021-08-04 RX ADMIN — FLUDARABINE PHOSPHATE 70 MG: 25 INJECTION, SOLUTION INTRAVENOUS at 10:07

## 2021-08-04 RX ADMIN — FLUCONAZOLE 200 MG: 200 TABLET ORAL at 09:38

## 2021-08-04 RX ADMIN — URSODIOL 300 MG: 300 CAPSULE ORAL at 14:24

## 2021-08-04 RX ADMIN — FUROSEMIDE 20 MG: 10 INJECTION, SOLUTION INTRAVENOUS at 15:25

## 2021-08-04 RX ADMIN — ALLOPURINOL 300 MG: 300 TABLET ORAL at 09:38

## 2021-08-04 RX ADMIN — URSODIOL 300 MG: 300 CAPSULE ORAL at 09:39

## 2021-08-04 RX ADMIN — PANTOPRAZOLE SODIUM 40 MG: 40 TABLET, DELAYED RELEASE ORAL at 09:39

## 2021-08-04 RX ADMIN — POLYETHYLENE GLYCOL 3350 17 G: 17 POWDER, FOR SOLUTION ORAL at 21:08

## 2021-08-04 RX ADMIN — Medication 5 MG: at 21:33

## 2021-08-04 RX ADMIN — HEPARIN, PORCINE (PF) 10 UNIT/ML INTRAVENOUS SYRINGE 5 ML: at 09:00

## 2021-08-04 RX ADMIN — ONDANSETRON HYDROCHLORIDE 8 MG: 8 TABLET, FILM COATED ORAL at 16:42

## 2021-08-04 RX ADMIN — CEFAZOLIN SODIUM 2 G: 2 INJECTION, SOLUTION INTRAVENOUS at 08:20

## 2021-08-04 RX ADMIN — FUROSEMIDE 10 MG: 10 INJECTION, SOLUTION INTRAVENOUS at 21:38

## 2021-08-04 RX ADMIN — RIVAROXABAN 20 MG: 20 TABLET, FILM COATED ORAL at 17:47

## 2021-08-04 RX ADMIN — DEXTROSE AND SODIUM CHLORIDE 1000 ML: 5; 450 INJECTION, SOLUTION INTRAVENOUS at 05:42

## 2021-08-04 RX ADMIN — ONDANSETRON HYDROCHLORIDE 8 MG: 8 TABLET, FILM COATED ORAL at 23:17

## 2021-08-04 ASSESSMENT — ACTIVITIES OF DAILY LIVING (ADL)
ADLS_ACUITY_SCORE: 14

## 2021-08-04 ASSESSMENT — MIFFLIN-ST. JEOR
SCORE: 1941.38
SCORE: 1916.43

## 2021-08-04 NOTE — PRE-PROCEDURE
GENERAL PRE-PROCEDURE:   Procedure:  Tunneled CVC placement    Written consent obtained?: Yes    Risks and benefits: Risks, benefits and alternatives were discussed    Consent given by:  Patient  Patient states understanding of procedure being performed: Yes    Patient's understanding of procedure matches consent: Yes    Procedure consent matches procedure scheduled: Yes    Expected level of sedation:  Moderate  Appropriately NPO:  Yes  Mallampati  :  Grade 2- soft palate, base of uvula, tonsillar pillars, and portion of posterior pharyngeal wall visible  Lungs:  Lungs clear with good breath sounds bilaterally  Heart:  Normal heart sounds and rate  History & Physical reviewed:  History and physical reviewed and no updates needed  Statement of review:  I have reviewed the lab findings, diagnostic data, medications, and the plan for sedation

## 2021-08-04 NOTE — PROGRESS NOTES
"   08/04/21 1000   Quick Adds   Type of Visit Initial Occupational Therapy Evaluation       Present no   Language English    Living Environment   People in home spouse   Current Living Arrangements house   Home Accessibility stairs to enter home;stairs within home   Number of Stairs, Main Entrance 2   Stair Railings, Main Entrance none   Number of Stairs, Within Home, Primary other (see comments);5  (upstairs (bedroom), downstairs (family room))   Stair Railings, Within Home, Primary railings safe and in good condition   Transportation Anticipated family or friend will provide   Living Environment Comments Pt lives in a house w/wife and dog, uses walk-in shower and raised toilet. Laundry on lowest level w/out railings.    Self-Care   Usual Activity Tolerance good   Current Activity Tolerance good   Regular Exercise No   Equipment Currently Used at Home none   Activity/Exercise/Self-Care Comment Pt reports IND at baseline    Instrumental Activities of Daily Living (IADL)   Previous Responsibilities meal prep;housekeeping;finances;driving;work;shopping   IADL Comments Pt and pt's wife share IADL responsibilities, hasn't been working since June    Disability/Function   Hearing Difficulty or Deaf no   Wear Glasses or Blind no   Concentrating, Remembering or Making Decisions Difficulty no   Difficulty Communicating no   Difficulty Eating/Swallowing no   Walking or Climbing Stairs Difficulty no   Dressing/Bathing Difficulty no   Toileting issues no   Doing Errands Independently Difficulty (such as shopping) no   Fall history within last six months no   Change in Functional Status Since Onset of Current Illness/Injury no   General Information   Onset of Illness/Injury or Date of Surgery 08/03/21   Referring Physician Lashanda Figueroa PA-C   Patient/Family Therapy Goal Statement (OT) \"Make it through and go home\"   Additional Occupational Profile Info/Pertinent History of Current Problem Nik MCDONALD" Elijah is a 61 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, here for flu/cy/TBI and sib allo stem cell transplant   Existing Precautions/Restrictions immunosuppressed   Left Upper Extremity (Weight-bearing Status) full weight-bearing (FWB)   Right Upper Extremity (Weight-bearing Status) full weight-bearing (FWB)   Left Lower Extremity (Weight-bearing Status) full weight-bearing (FWB)   Right Lower Extremity (Weight-bearing Status) full weight-bearing (FWB)   Cognitive Status Examination   Orientation Status orientation to person, place and time   Affect/Mental Status (Cognitive) WNL   Visual Perception   Visual Impairment/Limitations WNL   Sensory   Sensory Comments Pt reports chronic tingling in BUE/BLE, worst w/BLE   Pain Assessment   Patient Currently in Pain No   Integumentary/Edema   Integumentary/Edema no deficits were identifed   Posture   Posture not impaired   Range of Motion Comprehensive   Comment, General Range of Motion NT 2/2 new TCVC line placed prior to OT session    Strength Comprehensive (MMT)   Comment, General Manual Muscle Testing (MMT) Assessment abdiel hand  5/5   Balance   Balance Comments no LOB during dynamic standing    Clinical Impression   Criteria for Skilled Therapeutic Interventions Met (OT) yes   OT Diagnosis t risk for further deconditioning w/medical treatment (or IP stay) which could impact functional performance of ADLs/IADLs   OT Problem List-Impairments impacting ADL problems related to;activity tolerance impaired;other (see comments)  (immunocompromised )   Assessment of Occupational Performance 1-3 Performance Deficits   Identified Performance Deficits exercise, bathing, leisure    Planned Therapy Interventions (OT) progressive activity/exercise;risk factor education;home program guidelines;strengthening   Clinical Decision Making Complexity (OT) low complexity   Therapy Frequency (OT) 3x/week   Predicted Duration of Therapy 2-4 weeks   Anticipated Equipment Needs Upon Discharge (OT)    (TBD)   Risk & Benefits of therapy have been explained evaluation/treatment results reviewed;care plan/treatment goals reviewed;risks/benefits reviewed;patient   Comment-Clinical Impression Pt reports IND w/ADLs and mobility, however, at risk for further deconditioning w/medical treatment (or IP stay) which could impact functional performance of ADLs. OT to follow 3x/week to provide training in HEP and education in lab value parameters for safe activity at discharge   OT Discharge Planning    OT Discharge Recommendation (DC Rec) Home with assist   OT Rationale for DC Rec Anticipate safe d/c home w/family to assist   OT Brief overview of current status  IND   Total Evaluation Time (Minutes)   Total Evaluation Time (Minutes) 3

## 2021-08-04 NOTE — PROGRESS NOTES
Therapy: linecare  Ins[place]   Ded: $300.00  Met: $300.00    Co-Insurance: 15%  Max Out of Pocket: $3,500  Met: $3,500      IN REFERENCE TO ADMISSION DATE 8/3/2021 TO CHECK LINECARE COVERAGE.    Please contact Intake with any questions, [phone] or In Basket pool, [facility] ([phone]).

## 2021-08-04 NOTE — PROVIDER NOTIFICATION
Paged raina [phone]:  pt on cytoxan flush, afternoon sodium dropped from 140 to 134, would you like to change MIVF to NS from D5 1/2NS?

## 2021-08-04 NOTE — PROGRESS NOTES
Cross cover    Per BMT sign out, switched fluids from D5 1/2 NS to NS (no change in rate of 250 mL/hr) for sodium drop of 6 points over the course of the day today. Ordered Na recheck for 4 hours after fluids switch. Discussed w bedside nursing staff.     Dr. Zeina Philip  Internal Medicine/Pediatrics      This note was created using voice recognition software and may contain minor errors.

## 2021-08-04 NOTE — PROGRESS NOTES
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 Otherwise no new complaints overnight.       Review of Systems    A 10 point review of systems was negative other than noted above.     PHYSICAL EXAM      Weight     Wt Readings from Last 3 Encounters:   08/04/21 108 kg (238 lb)   07/19/21 108 kg (238 lb)   07/13/21 108 kg (238 lb)          BP (!) 149/94   Pulse 68   Temp 97.4  F (36.3  C) (Oral)   Resp 16   Ht 1.835 m (6' 0.24\")   Wt 108 kg (238 lb)   SpO2 97%   BMI 32.06 kg/m       KPS: 90  General: NAD   Eyes: YARELIS, sclera anicteric   Nose/Mouth/Throat: OP clear, buccal mucosa moist, no ulcerations   Lungs: CTA bilaterally  Cardiovascular: RRR, no M/R/G   Abdominal/Rectal: +BS, soft, NT, ND, No HSM   Lymphatics: No edema  Skin: No rashes or petechaie  Neuro: A&O   Vascular Access: port in the right chest, left double lumen blake  Labs:  Lab Results   Component Value Date    WBC 5.4 08/04/2021    ANEU 7.2 07/08/2021    HGB 9.7 (L) 08/04/2021    HCT 29.9 (L) 08/04/2021     08/04/2021     08/04/2021    POTASSIUM 3.5 08/04/2021    CHLORIDE 108 08/04/2021    CO2 26 08/04/2021     (H) 08/04/2021    BUN 19 08/04/2021    CR 0.86 08/04/2021    MAG 2.1 08/04/2021    INR 0.92 08/03/2021    BILITOTAL 0.5 08/03/2021    AST 22 08/03/2021    ALT 26 08/03/2021    ALKPHOS 58 08/03/2021    PROTTOTAL 7.4 08/03/2021    ALBUMIN 3.9 08/03/2021       OVERALL ASSESSMENT AND PLAN   Nik Nava is a 61 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  Here for flu/cy/TBI and sib allo sct  HCT-CI score: 3 (prior solid tumor)  - allopurinol through day 0  - GCSF starts " on day +5 until ANC >2500 x 2    2.  Recent pulmonary emboli-he developed a pulmonary emboli in the setting of receiving PEG asparaginase.  He is currently receiving anticoagulation.  xarelto resumed and does not need to be held for line placement.  Will continue until plt count < 50k.     3.  History of kidney cancer-he has a history of renal cancer and has a prior resection.  For that reason he has a single unilateral kidney.     4.  Dental clearance:  Ok to proceed    5.  GVHD:  Tac/MMF to to start on day -3; tac level on day -1    6.  ID:  prophy acv/letermovir, fluconazole, levaquin  - sulfa allergic; pentamidine at day +28    7.  Heme:  Transfuse for hgb <7g/dL; plt < 10k    8. Hx of graves disease; On synthroid       Patito Mims NP  [id number]

## 2021-08-04 NOTE — PROGRESS NOTES
Patient Name: Nik Nava  Medical Record Number: [id number]  Today's Date: 8/4/2021    Procedure: percutaneous double lumen central line placement  Proceduralist: Lilia GOMES    Procedure Start: 0839  Procedure end: 0900  Sedation medications administered: 2mg versed IV, 100mcg fentanyl IV     Report given to: Malinda NUNEZ  : n/a    Other Notes: Pt arrived to IR room 2 from . Consent reviewed. Pt denies any questions or concerns regarding procedure. Pt positioned supine and monitored per protocol. Pt tolerated procedure without any noted complications. Pt transferred back to .

## 2021-08-04 NOTE — PROCEDURES
[facility]    Procedure: IR Procedure Note    Date/Time: 8/4/2021 9:14 AM  Performed by: Cornelio Rodrigues PA-C  Authorized by: Cornelio Rodrigues PA-C     UNIVERSAL PROTOCOL   Site Marked: NA  Prior Images Obtained and Reviewed:  Yes  Required items: Required blood products, implants, devices and special equipment available    Patient identity confirmed:  Verbally with patient, arm band, provided demographic data and hospital-assigned identification number  Patient was reevaluated immediately before administering moderate or deep sedation or anesthesia  Confirmation Checklist:  Patient's identity using two indicators, relevant allergies, procedure was appropriate and matched the consent or emergent situation and correct equipment/implants were available  Time out: Immediately prior to the procedure a time out was called    Universal Protocol: the Joint Commission Universal Protocol was followed    Preparation: Patient was prepped and draped in usual sterile fashion           ANESTHESIA    Anesthesia: Local infiltration  Local Anesthetic:  Lidocaine 1% without epinephrine  Anesthetic Total (mL):  8      SEDATION    Patient Sedated: Yes    Sedation Type:  Moderate (conscious) sedation  Sedation:  Fentanyl and midazolam  Vital signs: Vital signs monitored during sedation    Fluoroscopy Time: 2 minute(s)  See dictated procedure note for full details.  Findings: Moderate sedation for TCVC placement - 2 mg of midazolam and 100 mcg of fentanyl. Patient tolerated the procedure well.     Specimens: none    Complications: None    Condition: Stable    Plan: 1 hour bedrest    PROCEDURE   Patient Tolerance:  Patient tolerated the procedure well with no immediate complications  Describe Procedure: Completed image-guided placement of 9.5 Yi, 32.5 cm double lumen tunneled central venous catheter via left IJ. Aspirates and flushes freely, heparin locked and ready for immediate  use. Tip in high right atrium.    Length of time physician/provider present for 1:1 monitoring during sedation: 20

## 2021-08-04 NOTE — PROGRESS NOTES
"Occupational Therapy Initial Evaluation Testing   FACIT Fatigue score: 39    A score of < 30 indicates below normal range   The FACIT-Fatigue scale assesses self-reported fatigue and its impact on daily activities and function during the past week.  Scores range 52-0, with lower scores indicating worse fatigue.    40 is the average score in general [place] population with a standard deviation of ~10.    Minimally Important Difference   3-4 points.    Source: Scoring & Interpretation Materials at http://www.facit.org/FACITOrg/Questionnaires       08/04/21 1100   FACIT-Fatigue Scale (Version 4) Copyright 1978, 1997 by Wojciech Garcia, PhD   Signing Clinician's Name/Credentials Joann Greer, OTR/L   Below is a list of statements that other people with your illness have said are important. Please [place] or dwight one number per line to indicate your response as it applies to the past 7 days.   I feel fatigued 1 - A little bit   I feel weak all over 1 - A little bit   I feel listless (\"washed out\") 1 - A little bit   I feel tired 1 - A little bit   I have trouble starting things because I am tired 1 - A little bit   I have trouble finishing things because I am tired 1 - A little bit   I have energy 2 - Somewhat   I am able to do my usual activities 2 - Somewhat   I need to sleep during the day 1 - A little bit   I am too tired to eat 0 - Not at all   I need help doing my usual activities 0 - Not at all   I am frustrated by being too tired to do the things I want to do 1 - A little bit   I have to limit my social activity because I am tired  1 - A little bit   FACIT-Fatigue Subscale Scoring   HI7 Item calculation 3   HI12 Item Calculation 3   An1 Item Calculation 3   An2 Item Calculation 3   An3 Item Calculation 3   An4 Item Calculation 3   An5 Item Calculation 2   An7 Item Calculation 2   An8 Item Calculation 3   An12 Item Calculation 4   An14 Item Calculation 4   An15 Item Calculation 3   An16 Item Calculation 3 " "  Total Item Calculations Sum 39   Item Calculated Sum multiplied by 13 507   FACIT Fatigue Subscale (score out of 52). The higher the score, the better the QOL. 39       30-Second Sit to Stand Test:  The test is designed to be conducted with a straight back chair, without armrests, with a 17-inch seat height.  (Chair heights: High back & Folding = 18\", ICU/5C recliner & window seat = 18 1/2\"  Actual height of chair used: 18\"    Patient Score (score =0 if must use arms) 13 reps    The 30 Second Sit to Stand Test is considered a test of fall risk.  Data from [place] NIKA, cosponsored by [place] Dept of Health:  If must use arms = High Fall Risk regardless of reps  8 or less times = High Fall Risk   9 to 12 times = Moderate Risk  13 or more times = Low Risk    The 30 Second Sit to Stand Test is also considered a test of leg strength and endurance.   Normative Data from Brandon et al,. 2001  Age                 Reps: Men        Reps: Women  60-64                14-19                       12-17                               65-69                12-18                       11-16                    70-74                12-17                       10-15              75-79                11-17                       10-15                    80-84                10-15                         9-14  85-89                 8-14                          8-13  90-94                 7-12                          4-11    "

## 2021-08-04 NOTE — PLAN OF CARE
PT 5C: Holding - PT orders acknowledged and appreciated. Pt mobilizing IND at this time and will only require 1 discipline to follow. PT to hold evaluation and re-assess pt needs on D+10. Please refer to OT notes for updates on pt's therapy status. Thank you for your referral.

## 2021-08-04 NOTE — PROGRESS NOTES
SPIRITUAL HEALTH SERVICES  SPIRITUAL ASSESSMENT Progress Note  [facility] ([place] 5C   ON-CALL VISIT    REFERRAL SOURCE: Follow up from on call  Raj     Briefly met with Nik and son Alejandro at bedside. Shared sample BMT blessing materials with Nik, and advised I would follow up late this week or early next.    PLAN: I will continue to follow. Spiritual Health Services remains available for patient, family, and staff support.     Please page the on call  either via [facility] [employer] Web (Spiritual Health/[facility]) or by placing a STAT or ASAP Epic referral for Spiritual Health (which will roll to the on call pager).        Bettye Sandoval  Chaplain Resident  Pager: [phone]

## 2021-08-04 NOTE — PLAN OF CARE
"1900-0730: BP (!) 145/79 (BP Location: Left arm)   Pulse 69   Temp 97.5  F (36.4  C) (Axillary)   Resp 16   Ht 1.835 m (6' 0.24\")   Wt 107.6 kg (237 lb 3.4 oz)   SpO2 95%   BMI 31.96 kg/m      Patient afebrile, BPs 140-150s/70s, other VSS. Denies pain, nausea or other complaints. Active in room. Cytoxan IV flush started at 2200 last evening at 250 ml/hr. Very good urine output. No AM lab replacements. Pt NPO for CVC placement- currently scheduled for 1330. Fludarabine and Cytoxan this AM. Walking in the halls this morning. Continue BMT plan of care.      Problem: Adult Inpatient Plan of Care  Goal: Plan of Care Review  Outcome: No Change     Problem: Adjustment to Transplant (Stem Cell/Bone Marrow Transplant)  Goal: Optimal Coping with Transplant  Outcome: No Change     Problem: Bladder Irritation (Stem Cell/Bone Marrow Transplant)  Goal: Symptom-Free Urinary Elimination  Outcome: No Change     Problem: Diarrhea (Stem Cell/Bone Marrow Transplant)  Goal: Diarrhea Symptom Control  Outcome: No Change     Problem: Fatigue (Stem Cell/Bone Marrow Transplant)  Goal: Energy Level Supports Daily Activity  Outcome: No Change     Problem: Hematologic Alteration (Stem Cell/Bone Marrow Transplant)  Goal: Blood Counts Within Acceptable Range  Outcome: No Change     Problem: Hypersensitivity Reaction (Stem Cell/Bone Marrow Transplant)  Goal: Absence of Hypersensitivity Reaction  Outcome: No Change     Problem: Infection Risk (Stem Cell/Bone Marrow Transplant)  Goal: Absence of Infection  Outcome: No Change     Problem: Mucositis (Stem Cell/Bone Marrow Transplant)  Goal: Mucous Membrane Health and Integrity  Outcome: No Change     Problem: Nausea and Vomiting (Stem Cell/Bone Marrow Transplant)  Goal: Nausea and Vomiting Symptom Relief  Outcome: No Change     Problem: Nutrition Intake Altered (Stem Cell/Bone Marrow Transplant)  Goal: Optimal Nutrition Intake  Outcome: No Change     "

## 2021-08-05 ENCOUNTER — APPOINTMENT (OUTPATIENT)
Dept: OCCUPATIONAL THERAPY | Facility: CLINIC | Age: 61
End: 2021-08-05
Attending: INTERNAL MEDICINE
Payer: COMMERCIAL

## 2021-08-05 LAB
ANION GAP SERPL CALCULATED.3IONS-SCNC: 8 MMOL/L (ref 3–14)
BASOPHILS # BLD AUTO: 0 10E3/UL (ref 0–0.2)
BASOPHILS NFR BLD AUTO: 0 %
BUN SERPL-MCNC: 19 MG/DL (ref 7–30)
CALCIUM SERPL-MCNC: 8.1 MG/DL (ref 8.5–10.1)
CHLORIDE BLD-SCNC: 98 MMOL/L (ref 94–109)
CO2 SERPL-SCNC: 23 MMOL/L (ref 20–32)
CREAT SERPL-MCNC: 0.91 MG/DL (ref 0.66–1.25)
EOSINOPHIL # BLD AUTO: 0 10E3/UL (ref 0–0.7)
EOSINOPHIL NFR BLD AUTO: 0 %
ERYTHROCYTE [DISTWIDTH] IN BLOOD BY AUTOMATED COUNT: 15.3 % (ref 10–15)
GFR SERPL CREATININE-BSD FRML MDRD: 89 ML/MIN/1.73M2
GLUCOSE BLD-MCNC: 115 MG/DL (ref 70–99)
HCT VFR BLD AUTO: 29.3 % (ref 40–53)
HGB BLD-MCNC: 9.8 G/DL (ref 13.3–17.7)
IMM GRANULOCYTES # BLD: 0 10E3/UL
IMM GRANULOCYTES NFR BLD: 1 %
LYMPHOCYTES # BLD AUTO: 0.8 10E3/UL (ref 0.8–5.3)
LYMPHOCYTES NFR BLD AUTO: 11 %
MAGNESIUM SERPL-MCNC: 1.7 MG/DL (ref 1.6–2.3)
MCH RBC QN AUTO: 33 PG (ref 26.5–33)
MCHC RBC AUTO-ENTMCNC: 33.4 G/DL (ref 31.5–36.5)
MCV RBC AUTO: 99 FL (ref 78–100)
MONOCYTES # BLD AUTO: 0.4 10E3/UL (ref 0–1.3)
MONOCYTES NFR BLD AUTO: 6 %
NEUTROPHILS # BLD AUTO: 6 10E3/UL (ref 1.6–8.3)
NEUTROPHILS NFR BLD AUTO: 82 %
NRBC # BLD AUTO: 0 10E3/UL
NRBC BLD AUTO-RTO: 0 /100
PHOSPHATE SERPL-MCNC: 3.4 MG/DL (ref 2.5–4.5)
PLATELET # BLD AUTO: 237 10E3/UL (ref 150–450)
POTASSIUM BLD-SCNC: 3.6 MMOL/L (ref 3.4–5.3)
POTASSIUM BLD-SCNC: 3.6 MMOL/L (ref 3.4–5.3)
POTASSIUM BLD-SCNC: 3.9 MMOL/L (ref 3.4–5.3)
RBC # BLD AUTO: 2.97 10E6/UL (ref 4.4–5.9)
SODIUM SERPL-SCNC: 129 MMOL/L (ref 133–144)
SODIUM SERPL-SCNC: 132 MMOL/L (ref 133–144)
WBC # BLD AUTO: 7.2 10E3/UL (ref 4–11)

## 2021-08-05 PROCEDURE — 258N000003 HC RX IP 258 OP 636: Performed by: STUDENT IN AN ORGANIZED HEALTH CARE EDUCATION/TRAINING PROGRAM

## 2021-08-05 PROCEDURE — 258N000003 HC RX IP 258 OP 636: Performed by: INTERNAL MEDICINE

## 2021-08-05 PROCEDURE — 84295 ASSAY OF SERUM SODIUM: CPT | Performed by: INTERNAL MEDICINE

## 2021-08-05 PROCEDURE — 250N000013 HC RX MED GY IP 250 OP 250 PS 637: Performed by: NURSE PRACTITIONER

## 2021-08-05 PROCEDURE — 83735 ASSAY OF MAGNESIUM: CPT | Performed by: INTERNAL MEDICINE

## 2021-08-05 PROCEDURE — 250N000013 HC RX MED GY IP 250 OP 250 PS 637: Performed by: INTERNAL MEDICINE

## 2021-08-05 PROCEDURE — 206N000001 HC R&B BMT UMMC

## 2021-08-05 PROCEDURE — 84100 ASSAY OF PHOSPHORUS: CPT | Performed by: INTERNAL MEDICINE

## 2021-08-05 PROCEDURE — 250N000012 HC RX MED GY IP 250 OP 636 PS 637: Performed by: INTERNAL MEDICINE

## 2021-08-05 PROCEDURE — 85025 COMPLETE CBC W/AUTO DIFF WBC: CPT | Performed by: INTERNAL MEDICINE

## 2021-08-05 PROCEDURE — 97110 THERAPEUTIC EXERCISES: CPT | Mod: GO

## 2021-08-05 PROCEDURE — 84295 ASSAY OF SERUM SODIUM: CPT | Performed by: PHYSICIAN ASSISTANT

## 2021-08-05 PROCEDURE — 250N000011 HC RX IP 250 OP 636: Performed by: INTERNAL MEDICINE

## 2021-08-05 PROCEDURE — 80048 BASIC METABOLIC PNL TOTAL CA: CPT | Performed by: INTERNAL MEDICINE

## 2021-08-05 PROCEDURE — 250N000013 HC RX MED GY IP 250 OP 250 PS 637: Performed by: STUDENT IN AN ORGANIZED HEALTH CARE EDUCATION/TRAINING PROGRAM

## 2021-08-05 PROCEDURE — 86900 BLOOD TYPING SEROLOGIC ABO: CPT | Performed by: INTERNAL MEDICINE

## 2021-08-05 PROCEDURE — 99233 SBSQ HOSP IP/OBS HIGH 50: CPT | Performed by: INTERNAL MEDICINE

## 2021-08-05 PROCEDURE — 84132 ASSAY OF SERUM POTASSIUM: CPT | Performed by: INTERNAL MEDICINE

## 2021-08-05 PROCEDURE — 250N000013 HC RX MED GY IP 250 OP 250 PS 637: Performed by: PHYSICIAN ASSISTANT

## 2021-08-05 RX ADMIN — POLYETHYLENE GLYCOL 3350 17 G: 17 POWDER, FOR SOLUTION ORAL at 19:51

## 2021-08-05 RX ADMIN — LEVOTHYROXINE SODIUM 200 MCG: 0.05 TABLET ORAL at 07:59

## 2021-08-05 RX ADMIN — FLUDARABINE PHOSPHATE 70 MG: 25 INJECTION, SOLUTION INTRAVENOUS at 09:07

## 2021-08-05 RX ADMIN — SODIUM CHLORIDE, PRESERVATIVE FREE 10 ML: 5 INJECTION INTRAVENOUS at 14:10

## 2021-08-05 RX ADMIN — SODIUM CHLORIDE: 9 INJECTION, SOLUTION INTRAVENOUS at 10:25

## 2021-08-05 RX ADMIN — MAGNESIUM HYDROXIDE 30 ML: 400 SUSPENSION ORAL at 23:11

## 2021-08-05 RX ADMIN — ONDANSETRON HYDROCHLORIDE 8 MG: 8 TABLET, FILM COATED ORAL at 16:28

## 2021-08-05 RX ADMIN — FUROSEMIDE 10 MG: 10 INJECTION, SOLUTION INTRAVENOUS at 08:27

## 2021-08-05 RX ADMIN — ONDANSETRON HYDROCHLORIDE 8 MG: 8 TABLET, FILM COATED ORAL at 07:58

## 2021-08-05 RX ADMIN — FLUCONAZOLE 200 MG: 200 TABLET ORAL at 07:59

## 2021-08-05 RX ADMIN — ALLOPURINOL 300 MG: 300 TABLET ORAL at 07:59

## 2021-08-05 RX ADMIN — FUROSEMIDE 10 MG: 10 INJECTION, SOLUTION INTRAVENOUS at 01:22

## 2021-08-05 RX ADMIN — URSODIOL 300 MG: 300 CAPSULE ORAL at 19:51

## 2021-08-05 RX ADMIN — LORAZEPAM 1 MG: 0.5 TABLET ORAL at 22:22

## 2021-08-05 RX ADMIN — URSODIOL 300 MG: 300 CAPSULE ORAL at 07:58

## 2021-08-05 RX ADMIN — PANTOPRAZOLE SODIUM 40 MG: 40 TABLET, DELAYED RELEASE ORAL at 07:58

## 2021-08-05 RX ADMIN — Medication 5 MG: at 22:22

## 2021-08-05 RX ADMIN — URSODIOL 300 MG: 300 CAPSULE ORAL at 14:10

## 2021-08-05 RX ADMIN — RIVAROXABAN 20 MG: 20 TABLET, FILM COATED ORAL at 18:50

## 2021-08-05 RX ADMIN — DEXAMETHASONE 10 MG: 2 TABLET ORAL at 07:59

## 2021-08-05 RX ADMIN — ONDANSETRON HYDROCHLORIDE 8 MG: 8 TABLET, FILM COATED ORAL at 23:11

## 2021-08-05 ASSESSMENT — MIFFLIN-ST. JEOR
SCORE: 1945.91
SCORE: 1945.46

## 2021-08-05 ASSESSMENT — ACTIVITIES OF DAILY LIVING (ADL)
ADLS_ACUITY_SCORE: 14

## 2021-08-05 NOTE — PROGRESS NOTES
"  Chemo drug: fludarbine and cytoxan  Tolerated: well; mild fogginess noted this afternoon stating \"I just don't feel sharp\"  Intervention: no interventions needed. Pre-med with zofran and dex; MIVF infusing at 204 ml/hr and continuous mesna infusing at 46 ml/hr  Response: tolerated well  Plan: continue to monitor will get repeat doses of fludarabine for the next 4 days.  Lab: Na, K- K within normal limits. Na level dropped to 134, switched IVF to NS and will recheck Na level at 2200.  Wt/intervention: weight up 25kg, given 20mg lasix at 1500 with good response, currently up 850ml    "

## 2021-08-05 NOTE — PROGRESS NOTES
Chemo drug: fludarabine  Tolerated: well  Intervention: pre-med with zofran and dex  Response: tolerated well  Plan: will get additional doses of fludarabine x3 more doses  Lab: Na, K, UpH drug level  Wt/intervention: weight up 1 lb from last night. Pt will continue cytoxan flush until 1330, getting lasix q2hr as needed to meet urine output parameters

## 2021-08-05 NOTE — PROVIDER NOTIFICATION
Paged moonlighter:   fyi-  pt finished cytoxan flush this afternoon and PM sodium back down to 129 from 132 this morning. pt instructed this morning not to drink free water and has not.

## 2021-08-05 NOTE — PLAN OF CARE
Pt continues to be hypertensive at times, spoke with raina who did not want to intervene for /95 but instructed to notify if BP>180/105 or pt becomes symptomatic. Fludarabine and cytoxan given without complications. Fludarabine to be given again tomorrow. Port de-accessed. Pt meeting q2hr voiding parameters but was up 2500ml at 3pm, given 20mg lasix with good response, currently up 850ml for the day. Sodium down to 134 this afternoon, MIVF switched to NS, sodium recheck scheduled for 2200. Mcknight placed this morning in IR. Significant bleeding this afternoon requiring ~25 minutes of pressure held to site. Spoke with providers, okay to given xarelto as long as it is not bleeding at time dose is due. No bleeding since dressing change at 1430; site CDI. Xarelto given this evening.     Problem: Adult Inpatient Plan of Care  Goal: Plan of Care Review  Outcome: No Change  Flowsheets (Taken 8/4/2021 1914)  Plan of Care Reviewed With: patient  Progress: no change  Goal: Absence of Hospital-Acquired Illness or Injury  Intervention: Identify and Manage Fall Risk  Recent Flowsheet Documentation  Taken 8/4/2021 0948 by Malinda Davis, RN  Safety Promotion/Fall Prevention:    clutter free environment maintained    increased rounding and observation    increase visualization of patient  Intervention: Prevent Skin Injury  Recent Flowsheet Documentation  Taken 8/4/2021 0948 by Malinda Davis, RN  Body Position: position changed independently  Goal: Optimal Comfort and Wellbeing  Outcome: No Change     Problem: Fatigue (Stem Cell/Bone Marrow Transplant)  Goal: Energy Level Supports Daily Activity  Outcome: No Change

## 2021-08-05 NOTE — PROGRESS NOTES
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    Interventions: Clinical  (CSW) met with Pt at bedside to assess coping, provide supportive counseling and assist with resources as needed. Pt shared that he is doing well and has settled into his room. He states frequent walks are helpful to him. He is looking forward to having a treadmill in his room. Pt states his wife is feeling a little overwhelmed at work currently. She plans to take FMLA when Pt discharges the hospital. Pt states he is encouraging her to maintain self-care during his hospitalization. CSW provided empathic listening, validation of concerns, and encouragement. CSW encouraged Pt to contact CSW for support, questions and/or resources. CSW completed MOCA Assessment w/ Pt - see information below for results.    MOCA Assessment v7.1     Subject name: Nik Nava     Days from transplant: -5    MOCA version v7.1:    Visuospatial/Executive  3/5   Naming  3/3   Attention - digits  2/2   Attention - letter tapping  1/1   Attention - subtraction  3/3   Language - repeating 2/2   Language - fluency 0/1   Abstraction 22   Delayed Recall 4/5   Orientation  6/6   Education (+1 if education =< 12th grade)  0/1   Total    26/30       Assessments administered by: MATEO Shi on August 5, 2021    Please see provider progress note for physical exam, additional laboratory workup, and other additional clinical information.    MATEO Shi    Assessment: Pt presented as pleasant, calm, and engaged.  Pt appears to be coping appropriately at this time. Pt continues to be supported by his wife.     Plan: CSW will continue to provide supportive counseling and assistance with resources as needed. CSW will continue to collaborate with multidisciplinary team regarding  Pt's plan of care.     MATEO Garcia, [facility]  Adult Blood & Marrow Transplant   Phone: [phone]  Pager: [phone]

## 2021-08-05 NOTE — PLAN OF CARE
"1900-0730: BP (!) 150/81 (BP Location: Left arm)   Pulse 69   Temp 97.4  F (36.3  C) (Axillary)   Resp 16   Ht 1.835 m (6' 0.24\")   Wt 110.5 kg (243 lb 8 oz)   SpO2 94%   BMI 32.80 kg/m      Patient afebrile, BPs 140-150s/70-80s. Denies pain, nausea or other complaints. No more signs of bleeding from CVC site. On Cytoxan IV flush and continuous [place], did not meeting urine output parameters of 300 ml x 2 so received lasix 10 mg IV x 2 with good response. Intake and Output close to even for this morning. Currently drinking some coffee. Fludarabine again today. Cytoxan flush until about 1330 today. Continue BMT plan of care.      Problem: Adult Inpatient Plan of Care  Goal: Plan of Care Review  Outcome: No Change     Problem: Adjustment to Transplant (Stem Cell/Bone Marrow Transplant)  Goal: Optimal Coping with Transplant  Outcome: No Change     Problem: Bladder Irritation (Stem Cell/Bone Marrow Transplant)  Goal: Symptom-Free Urinary Elimination  Outcome: No Change     Problem: Diarrhea (Stem Cell/Bone Marrow Transplant)  Goal: Diarrhea Symptom Control  Outcome: No Change     Problem: Fatigue (Stem Cell/Bone Marrow Transplant)  Goal: Energy Level Supports Daily Activity  Outcome: No Change     Problem: Hematologic Alteration (Stem Cell/Bone Marrow Transplant)  Goal: Blood Counts Within Acceptable Range  Outcome: No Change     Problem: Hypersensitivity Reaction (Stem Cell/Bone Marrow Transplant)  Goal: Absence of Hypersensitivity Reaction  Outcome: No Change     Problem: Infection Risk (Stem Cell/Bone Marrow Transplant)  Goal: Absence of Infection  Outcome: No Change     Problem: Mucositis (Stem Cell/Bone Marrow Transplant)  Goal: Mucous Membrane Health and Integrity  Outcome: No Change     Problem: Nausea and Vomiting (Stem Cell/Bone Marrow Transplant)  Goal: Nausea and Vomiting Symptom Relief  Outcome: No Change     Problem: Nutrition Intake Altered (Stem Cell/Bone Marrow Transplant)  Goal: Optimal Nutrition " Intake  Outcome: No Change     Problem: Nutrition Intake Altered (Stem Cell/Bone Marrow Transplant)  Goal: Optimal Nutrition Intake  Outcome: No Change

## 2021-08-05 NOTE — PROGRESS NOTES
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  Has some sinus congestion with cytoxan.  No new medical complaints today.      Review of Systems    A 10 point review of systems was negative other than noted above.     PHYSICAL EXAM      Weight     Wt Readings from Last 3 Encounters:   08/04/21 110.5 kg (243 lb 8 oz)   07/19/21 108 kg (238 lb)   07/13/21 108 kg (238 lb)          BP (!) 150/81 (BP Location: Left arm)   Pulse 69   Temp 97.4  F (36.3  C) (Axillary)   Resp 16   Ht 1.835 m (6' 0.24\")   Wt 110.5 kg (243 lb 8 oz)   SpO2 94%   BMI 32.80 kg/m       KPS: 90  General: NAD   Eyes: YARELIS, sclera anicteric   Nose/Mouth/Throat: OP clear, buccal mucosa moist, no ulcerations   Lungs: CTA bilaterally  Cardiovascular: RRR, no M/R/G   Abdominal/Rectal: +BS, soft, NT, ND, No HSM   Lymphatics: No edema  Skin: No rashes or petechaie  Neuro: A&O   Vascular Access: port in the right chest, left double lumen blake    Labs:  Lab Results   Component Value Date    WBC 7.2 08/05/2021    ANEU 7.2 07/08/2021    HGB 9.8 (L) 08/05/2021    HCT 29.3 (L) 08/05/2021     08/05/2021     (L) 08/05/2021     (L) 08/05/2021    POTASSIUM 3.6 08/05/2021    POTASSIUM 3.6 08/05/2021    CHLORIDE 98 08/05/2021    CO2 23 08/05/2021     (H) 08/05/2021    BUN 19 08/05/2021    CR 0.91 08/05/2021    MAG 1.7 08/05/2021    INR 0.92 08/03/2021    BILITOTAL 0.5 08/03/2021    AST 22 08/03/2021    ALT 26 08/03/2021    ALKPHOS 58 08/03/2021    PROTTOTAL 7.4 08/03/2021    ALBUMIN 3.9 08/03/2021       OVERALL ASSESSMENT AND PLAN   Nik Nava is a 61 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  Here for flu/cy/TBI and sib allo sct  HCT-CI score: 3 (prior solid tumor)  - allopurinol through day 0  - GCSF starts on day +5 until ANC >2500 x 2    2.  Recent pulmonary emboli-he developed a pulmonary emboli in the setting of receiving PEG asparaginase.  on xarelto. Will continue until plt count < 50k.     3.  History of kidney cancer-he has a history of renal cancer and has a prior resection.  For that reason he has a single unilateral kidney.     4.  Dental clearance:  Ok to proceed    5.  GVHD:  Tac/MMF to to start on day -3; tac level on day -1    6.  ID:  prophy acv/letermovir, fluconazole, levaquin  - sulfa allergic; pentamidine at day +28    7.  Heme:  Transfuse for hgb <7g/dL; plt < 10k    8. Hx of graves disease; On synthroid    9.  FEN/renal:  - cytoxan induced hyponatremia; changed IVF to 0.9 NS       Lashanda Figueroa pa-c  [phone]

## 2021-08-05 NOTE — PROVIDER NOTIFICATION
Spoke with Lashanda BELLA regarding pt's sodium level.     Please check sodium level stat and instruct pt to not drink any free water at all today. -came back at 132    Provider conversation with pt, pt was instructed to limit free water intake to 500ml for the next couple of days. No orders in epic at this time. pt encouraged to drink fluids other than water. Will repeat sodium level at 1600.

## 2021-08-05 NOTE — PLAN OF CARE
Pt continues to be intermittently hypertensive. OVSS. Given fludarabine this morning without complications. Cytoxan flush finished at 1330. Given lasix 10mg x1 for not meeting parameters with good response. Sodium level this morning was 132; PM Na/K with K stable and Na down to 129. Pt encouraged not to drink plain water today and hasn't been but has been drinking a lot of coffee. PM weight stable. No further bleeding today from blake site, dressing needed to be changed again due to getting wet underneath dressing in shower. Part of stat seal remains in place under biopatch so as not to disturb the clot. Independent in room and cooperative of cares.    Problem: Adult Inpatient Plan of Care  Goal: Plan of Care Review  Outcome: No Change  Flowsheets (Taken 8/5/2021 1525)  Plan of Care Reviewed With: patient  Progress: no change  Goal: Absence of Hospital-Acquired Illness or Injury  Intervention: Identify and Manage Fall Risk  Recent Flowsheet Documentation  Taken 8/5/2021 0900 by Malinda Davis RN  Safety Promotion/Fall Prevention:    activity supervised    clutter free environment maintained    fall prevention program maintained    lighting adjusted  Intervention: Prevent Skin Injury  Recent Flowsheet Documentation  Taken 8/5/2021 0900 by Malinda Davis RN  Body Position: position changed independently  Intervention: Prevent and Manage VTE (Venous Thromboembolism) Risk  Recent Flowsheet Documentation  Taken 8/5/2021 0900 by Malinda Davis RN  VTE Prevention/Management:    ambulation promoted    AROM (active range of motion) performed  Goal: Optimal Comfort and Wellbeing  Outcome: No Change

## 2021-08-06 LAB
ABO/RH(D): NORMAL
ANION GAP SERPL CALCULATED.3IONS-SCNC: 7 MMOL/L (ref 3–14)
ANTIBODY SCREEN: NEGATIVE
BASOPHILS # BLD AUTO: 0 10E3/UL (ref 0–0.2)
BASOPHILS NFR BLD AUTO: 0 %
BUN SERPL-MCNC: 23 MG/DL (ref 7–30)
CALCIUM SERPL-MCNC: 7.9 MG/DL (ref 8.5–10.1)
CHLORIDE BLD-SCNC: 100 MMOL/L (ref 94–109)
CO2 SERPL-SCNC: 24 MMOL/L (ref 20–32)
CREAT SERPL-MCNC: 0.9 MG/DL (ref 0.66–1.25)
EOSINOPHIL # BLD AUTO: 0 10E3/UL (ref 0–0.7)
EOSINOPHIL NFR BLD AUTO: 0 %
ERYTHROCYTE [DISTWIDTH] IN BLOOD BY AUTOMATED COUNT: 15.1 % (ref 10–15)
GFR SERPL CREATININE-BSD FRML MDRD: >90 ML/MIN/1.73M2
GLUCOSE BLD-MCNC: 110 MG/DL (ref 70–99)
HCT VFR BLD AUTO: 26.8 % (ref 40–53)
HGB BLD-MCNC: 9.3 G/DL (ref 13.3–17.7)
IMM GRANULOCYTES # BLD: 0 10E3/UL
IMM GRANULOCYTES NFR BLD: 0 %
LYMPHOCYTES # BLD AUTO: 0.2 10E3/UL (ref 0.8–5.3)
LYMPHOCYTES NFR BLD AUTO: 3 %
MAGNESIUM SERPL-MCNC: 2.3 MG/DL (ref 1.6–2.3)
MCH RBC QN AUTO: 33.6 PG (ref 26.5–33)
MCHC RBC AUTO-ENTMCNC: 34.7 G/DL (ref 31.5–36.5)
MCV RBC AUTO: 97 FL (ref 78–100)
MONOCYTES # BLD AUTO: 0.4 10E3/UL (ref 0–1.3)
MONOCYTES NFR BLD AUTO: 5 %
NEUTROPHILS # BLD AUTO: 7.6 10E3/UL (ref 1.6–8.3)
NEUTROPHILS NFR BLD AUTO: 92 %
NRBC # BLD AUTO: 0 10E3/UL
NRBC BLD AUTO-RTO: 0 /100
PHOSPHATE SERPL-MCNC: 2.9 MG/DL (ref 2.5–4.5)
PLATELET # BLD AUTO: 239 10E3/UL (ref 150–450)
POTASSIUM BLD-SCNC: 3.9 MMOL/L (ref 3.4–5.3)
POTASSIUM BLD-SCNC: 3.9 MMOL/L (ref 3.4–5.3)
RBC # BLD AUTO: 2.77 10E6/UL (ref 4.4–5.9)
SODIUM SERPL-SCNC: 131 MMOL/L (ref 133–144)
SODIUM SERPL-SCNC: 131 MMOL/L (ref 133–144)
SPECIMEN EXPIRATION DATE: NORMAL
WBC # BLD AUTO: 8.2 10E3/UL (ref 4–11)

## 2021-08-06 PROCEDURE — 80048 BASIC METABOLIC PNL TOTAL CA: CPT | Performed by: INTERNAL MEDICINE

## 2021-08-06 PROCEDURE — 250N000012 HC RX MED GY IP 250 OP 636 PS 637: Performed by: INTERNAL MEDICINE

## 2021-08-06 PROCEDURE — 206N000001 HC R&B BMT UMMC

## 2021-08-06 PROCEDURE — 250N000011 HC RX IP 250 OP 636: Performed by: INTERNAL MEDICINE

## 2021-08-06 PROCEDURE — 85025 COMPLETE CBC W/AUTO DIFF WBC: CPT | Performed by: INTERNAL MEDICINE

## 2021-08-06 PROCEDURE — 258N000003 HC RX IP 258 OP 636: Performed by: INTERNAL MEDICINE

## 2021-08-06 PROCEDURE — 99233 SBSQ HOSP IP/OBS HIGH 50: CPT | Performed by: INTERNAL MEDICINE

## 2021-08-06 PROCEDURE — 84295 ASSAY OF SERUM SODIUM: CPT | Performed by: INTERNAL MEDICINE

## 2021-08-06 PROCEDURE — 250N000013 HC RX MED GY IP 250 OP 250 PS 637: Performed by: INTERNAL MEDICINE

## 2021-08-06 PROCEDURE — 250N000013 HC RX MED GY IP 250 OP 250 PS 637: Performed by: NURSE PRACTITIONER

## 2021-08-06 PROCEDURE — 83735 ASSAY OF MAGNESIUM: CPT | Performed by: INTERNAL MEDICINE

## 2021-08-06 PROCEDURE — 84132 ASSAY OF SERUM POTASSIUM: CPT | Performed by: INTERNAL MEDICINE

## 2021-08-06 PROCEDURE — 84100 ASSAY OF PHOSPHORUS: CPT | Performed by: INTERNAL MEDICINE

## 2021-08-06 PROCEDURE — 250N000013 HC RX MED GY IP 250 OP 250 PS 637: Performed by: PHYSICIAN ASSISTANT

## 2021-08-06 RX ORDER — SENNOSIDES 8.6 MG
8.6 TABLET ORAL 2 TIMES DAILY PRN
Status: DISCONTINUED | OUTPATIENT
Start: 2021-08-06 | End: 2021-08-08

## 2021-08-06 RX ADMIN — SODIUM CHLORIDE, PRESERVATIVE FREE 5 ML: 5 INJECTION INTRAVENOUS at 11:31

## 2021-08-06 RX ADMIN — ALLOPURINOL 300 MG: 300 TABLET ORAL at 08:49

## 2021-08-06 RX ADMIN — ONDANSETRON HYDROCHLORIDE 8 MG: 8 TABLET, FILM COATED ORAL at 16:24

## 2021-08-06 RX ADMIN — URSODIOL 300 MG: 300 CAPSULE ORAL at 14:39

## 2021-08-06 RX ADMIN — Medication 5 MG: at 21:19

## 2021-08-06 RX ADMIN — LEVOTHYROXINE SODIUM 200 MCG: 0.05 TABLET ORAL at 08:50

## 2021-08-06 RX ADMIN — ACYCLOVIR 800 MG: 800 TABLET ORAL at 21:19

## 2021-08-06 RX ADMIN — RIVAROXABAN 20 MG: 20 TABLET, FILM COATED ORAL at 18:36

## 2021-08-06 RX ADMIN — URSODIOL 300 MG: 300 CAPSULE ORAL at 21:19

## 2021-08-06 RX ADMIN — ACYCLOVIR 800 MG: 800 TABLET ORAL at 14:38

## 2021-08-06 RX ADMIN — PANTOPRAZOLE SODIUM 40 MG: 40 TABLET, DELAYED RELEASE ORAL at 08:49

## 2021-08-06 RX ADMIN — LORAZEPAM 1 MG: 0.5 TABLET ORAL at 22:01

## 2021-08-06 RX ADMIN — ACYCLOVIR 800 MG: 800 TABLET ORAL at 08:49

## 2021-08-06 RX ADMIN — ACYCLOVIR 800 MG: 800 TABLET ORAL at 18:36

## 2021-08-06 RX ADMIN — POLYETHYLENE GLYCOL 3350 17 G: 17 POWDER, FOR SOLUTION ORAL at 22:01

## 2021-08-06 RX ADMIN — FLUCONAZOLE 200 MG: 200 TABLET ORAL at 08:50

## 2021-08-06 RX ADMIN — ONDANSETRON HYDROCHLORIDE 8 MG: 8 TABLET, FILM COATED ORAL at 08:50

## 2021-08-06 RX ADMIN — FLUDARABINE PHOSPHATE 70 MG: 25 INJECTION, SOLUTION INTRAVENOUS at 09:30

## 2021-08-06 RX ADMIN — URSODIOL 300 MG: 300 CAPSULE ORAL at 08:49

## 2021-08-06 RX ADMIN — ACYCLOVIR 800 MG: 800 TABLET ORAL at 11:31

## 2021-08-06 RX ADMIN — DEXAMETHASONE 10 MG: 2 TABLET ORAL at 08:49

## 2021-08-06 ASSESSMENT — ACTIVITIES OF DAILY LIVING (ADL)
ADLS_ACUITY_SCORE: 14

## 2021-08-06 ASSESSMENT — MIFFLIN-ST. JEOR: SCORE: 1935.48

## 2021-08-06 NOTE — PLAN OF CARE
Problem: Adult Inpatient Plan of Care  Goal: Plan of Care Review  Outcome: No Change  Goal: Patient-Specific Goal (Individualized)  Outcome: No Change  Goal: Absence of Hospital-Acquired Illness or Injury  Outcome: No Change  Intervention: Identify and Manage Fall Risk  Recent Flowsheet Documentation  Taken 8/6/2021 0800 by Chelo Nicholas RN  Safety Promotion/Fall Prevention: nonskid shoes/slippers when out of bed  Intervention: Prevent Skin Injury  Recent Flowsheet Documentation  Taken 8/6/2021 0800 by Chelo Nicholas RN  Body Position: position changed independently  Intervention: Prevent and Manage VTE (Venous Thromboembolism) Risk  Recent Flowsheet Documentation  Taken 8/6/2021 0800 by Chelo Nicholas RN  VTE Prevention/Management:    ambulation promoted    bleeding risk assessed  Vss. No pain. No nausea. Received fludarabine today. Heparin locked. No breakfast. Ate an egg salad sandwich, chips, caesar salad and baked chips. No stool. Independent.

## 2021-08-06 NOTE — PROGRESS NOTES
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  Has not had a BM since admission but does not feel constipated.  No abd pain.  PO intake good.  No new medical complaints today.      Review of Systems    A 10 point review of systems was negative other than noted above.     PHYSICAL EXAM      Weight     Wt Readings from Last 3 Encounters:   08/06/21 109.9 kg (242 lb 3.2 oz)   07/19/21 108 kg (238 lb)   07/13/21 108 kg (238 lb)          /76 (BP Location: Left arm)   Pulse 68   Temp 97.7  F (36.5  C) (Oral)   Resp 16   Ht 1.835 m (6' 0.24\")   Wt 109.9 kg (242 lb 3.2 oz)   SpO2 99%   BMI 32.63 kg/m       KPS: 90  General: NAD   Eyes: YARELIS, sclera anicteric   Nose/Mouth/Throat: OP clear, buccal mucosa moist, no ulcerations   Lungs: CTA bilaterally  Cardiovascular: RRR, no M/R/G    Abdominal/Rectal: +BS, soft, NT, ND, No HSM   Lymphatics: No edema  Skin: No rashes or petechaie  Neuro: A&O   Vascular Access: port in the right chest, left double lumen blake    Labs:  Lab Results   Component Value Date    WBC 8.2 08/06/2021    ANEU 7.2 07/08/2021    HGB 9.3 (L) 08/06/2021    HCT 26.8 (L) 08/06/2021     08/06/2021     (L) 08/06/2021     (L) 08/06/2021    POTASSIUM 3.9 08/06/2021    POTASSIUM 3.9 08/06/2021    CHLORIDE 100 08/06/2021    CO2 24 08/06/2021     (H) 08/06/2021    BUN 23 08/06/2021    CR 0.90 08/06/2021    MAG 2.3 08/06/2021    INR 0.92 08/03/2021    BILITOTAL 0.5 08/03/2021    AST 22 08/03/2021    ALT 26 08/03/2021    ALKPHOS 58 08/03/2021    PROTTOTAL 7.4 08/03/2021    ALBUMIN 3.9 08/03/2021       OVERALL ASSESSMENT AND PLAN   Nik Nava is a 61 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  Here for flu/cy/TBI and sib allo sct  HCT-CI score: 3 (prior solid tumor)  - allopurinol through day 0  - GCSF starts on day +5 until ANC >2500 x 2    2.  Recent pulmonary emboli-he developed a pulmonary emboli in the setting of receiving PEG asparaginase.  on xarelto. Will continue until plt count < 50k.     3.  History of kidney cancer-he has a history of renal cancer and has a prior resection.  For that reason he has a single unilateral kidney.     4.  Dental clearance:  Ok to proceed    5.  GVHD:  Tac/MMF to to start on day -3; tac level on day -1    6.  ID:  prophy acv/letermovir, fluconazole, levaquin  - sulfa allergic; pentamidine at day +28    7.  Heme:  Transfuse for hgb <7g/dL; plt < 10k    8. Hx of graves disease; On synthroid    9.  FEN/renal:  - cytoxan induced hyponatremia; resolved       Lashanda Figueroa pa-c  [phone]

## 2021-08-06 NOTE — PROGRESS NOTES
"SPIRITUAL HEALTH SERVICES  SPIRITUAL ASSESSMENT Progress Note  [facility] ([place]) 5C BMT     REFERRAL SOURCE: Follow up on BMT blessing request    Bedside conversation with Nik about coping with hospitalization thus far and getting input re: BMT blessing. Nik shared that he feels some anxiety, and that being able to go on walks outside his room would better help manage this; the treadmill currently in room is not as effective.    Nik likes to listen to \"all kinds\" of music for pleasure and intellectual stimulation. He shared that he enjoys the melodies as well  picking out different instruments when listening and sharing his insights with others. 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    Nik expressed gratitude for family, friends, and broader community supporting him. He and his wife Lamar have been attending [employer] [facility] [employer] for the past 3 years; the past 1 year + has been primarily online due to COVID.    PLAN: I will continue to follow; will advise upcoming on call chaplains to check in re: reading preference for ceremony. Spiritual Health Services remains available for patient, family, and staff support.     Please page the on call  either via [employer] Web (Spiritual Health/[facility]) or by placing a STAT or ASAP Epic referral for Spiritual Health (which will roll to the on call pager).        Bettye Sandoval  Chaplain Resident  Pager: [phone]    "

## 2021-08-06 NOTE — PLAN OF CARE
Afebrile, VSS on RA. Pt denies pain and N/V. Last BM 8/3, pt has been taking PRN miralax, but requesting Milk of Mag instead stating it has worked better for him in the past. Provider paged and 1x order given. No BM overnight, still needs admit C. Diff collected. Pt encouraged by providers to limit water intake for sodium levels, drank about 240cc overnight with miralax and pills, encouraging other fluids. Na 131 this morning. Plan for Fludarabine again today.     Problem: Adult Inpatient Plan of Care  Goal: Plan of Care Review  Outcome: No Change     Problem: Adult Inpatient Plan of Care  Goal: Patient-Specific Goal (Individualized)  Outcome: No Change

## 2021-08-06 NOTE — PROGRESS NOTES
Cross cover    Requesting milk of magnesia as no BM for 2 days on Miralax. One time order placed. Won't interact w Levaquin, as this med is in AM.     Dr. Zeina Philip  Internal Medicine/Pediatrics      This note was created using voice recognition software and may contain minor errors.

## 2021-08-06 NOTE — PROVIDER NOTIFICATION
Provider paged ([phone]) Pt requesting order for Milk of Mag, currently taking PRN Miralax. Last BM 8/3.   Thanks  [phone]

## 2021-08-07 LAB
ANION GAP SERPL CALCULATED.3IONS-SCNC: 6 MMOL/L (ref 3–14)
BASOPHILS # BLD AUTO: 0 10E3/UL (ref 0–0.2)
BASOPHILS NFR BLD AUTO: 0 %
BUN SERPL-MCNC: 25 MG/DL (ref 7–30)
CALCIUM SERPL-MCNC: 8.5 MG/DL (ref 8.5–10.1)
CHLORIDE BLD-SCNC: 108 MMOL/L (ref 94–109)
CO2 SERPL-SCNC: 24 MMOL/L (ref 20–32)
CREAT SERPL-MCNC: 1.07 MG/DL (ref 0.66–1.25)
EOSINOPHIL # BLD AUTO: 0 10E3/UL (ref 0–0.7)
EOSINOPHIL NFR BLD AUTO: 0 %
ERYTHROCYTE [DISTWIDTH] IN BLOOD BY AUTOMATED COUNT: 15.3 % (ref 10–15)
GFR SERPL CREATININE-BSD FRML MDRD: 73 ML/MIN/1.73M2
GLUCOSE BLD-MCNC: 116 MG/DL (ref 70–99)
HCT VFR BLD AUTO: 27.4 % (ref 40–53)
HGB BLD-MCNC: 9.1 G/DL (ref 13.3–17.7)
IMM GRANULOCYTES # BLD: 0.1 10E3/UL
IMM GRANULOCYTES NFR BLD: 1 %
LYMPHOCYTES # BLD AUTO: 0.1 10E3/UL (ref 0.8–5.3)
LYMPHOCYTES NFR BLD AUTO: 1 %
MAGNESIUM SERPL-MCNC: 2.6 MG/DL (ref 1.6–2.3)
MCH RBC QN AUTO: 33.1 PG (ref 26.5–33)
MCHC RBC AUTO-ENTMCNC: 33.2 G/DL (ref 31.5–36.5)
MCV RBC AUTO: 100 FL (ref 78–100)
MONOCYTES # BLD AUTO: 0.2 10E3/UL (ref 0–1.3)
MONOCYTES NFR BLD AUTO: 3 %
NEUTROPHILS # BLD AUTO: 8.1 10E3/UL (ref 1.6–8.3)
NEUTROPHILS NFR BLD AUTO: 95 %
NRBC # BLD AUTO: 0 10E3/UL
NRBC BLD AUTO-RTO: 0 /100
PHOSPHATE SERPL-MCNC: 2.7 MG/DL (ref 2.5–4.5)
PLATELET # BLD AUTO: 254 10E3/UL (ref 150–450)
POTASSIUM BLD-SCNC: 4.2 MMOL/L (ref 3.4–5.3)
RBC # BLD AUTO: 2.75 10E6/UL (ref 4.4–5.9)
SODIUM SERPL-SCNC: 138 MMOL/L (ref 133–144)
WBC # BLD AUTO: 8.4 10E3/UL (ref 4–11)

## 2021-08-07 PROCEDURE — 84100 ASSAY OF PHOSPHORUS: CPT | Performed by: INTERNAL MEDICINE

## 2021-08-07 PROCEDURE — 250N000013 HC RX MED GY IP 250 OP 250 PS 637: Performed by: NURSE PRACTITIONER

## 2021-08-07 PROCEDURE — 85025 COMPLETE CBC W/AUTO DIFF WBC: CPT | Performed by: INTERNAL MEDICINE

## 2021-08-07 PROCEDURE — 250N000012 HC RX MED GY IP 250 OP 636 PS 637: Performed by: INTERNAL MEDICINE

## 2021-08-07 PROCEDURE — 250N000011 HC RX IP 250 OP 636: Performed by: INTERNAL MEDICINE

## 2021-08-07 PROCEDURE — 250N000009 HC RX 250: Performed by: INTERNAL MEDICINE

## 2021-08-07 PROCEDURE — 250N000013 HC RX MED GY IP 250 OP 250 PS 637: Performed by: INTERNAL MEDICINE

## 2021-08-07 PROCEDURE — 99233 SBSQ HOSP IP/OBS HIGH 50: CPT | Performed by: INTERNAL MEDICINE

## 2021-08-07 PROCEDURE — 83735 ASSAY OF MAGNESIUM: CPT | Performed by: INTERNAL MEDICINE

## 2021-08-07 PROCEDURE — 206N000001 HC R&B BMT UMMC

## 2021-08-07 PROCEDURE — 80048 BASIC METABOLIC PNL TOTAL CA: CPT | Performed by: INTERNAL MEDICINE

## 2021-08-07 PROCEDURE — 250N000013 HC RX MED GY IP 250 OP 250 PS 637: Performed by: STUDENT IN AN ORGANIZED HEALTH CARE EDUCATION/TRAINING PROGRAM

## 2021-08-07 PROCEDURE — 258N000003 HC RX IP 258 OP 636: Performed by: INTERNAL MEDICINE

## 2021-08-07 PROCEDURE — 250N000013 HC RX MED GY IP 250 OP 250 PS 637: Performed by: PHYSICIAN ASSISTANT

## 2021-08-07 RX ORDER — MAGNESIUM CARB/ALUMINUM HYDROX 105-160MG
296 TABLET,CHEWABLE ORAL ONCE
Status: COMPLETED | OUTPATIENT
Start: 2021-08-07 | End: 2021-08-07

## 2021-08-07 RX ADMIN — LEVOTHYROXINE SODIUM 200 MCG: 0.05 TABLET ORAL at 08:42

## 2021-08-07 RX ADMIN — RIVAROXABAN 20 MG: 20 TABLET, FILM COATED ORAL at 18:37

## 2021-08-07 RX ADMIN — TACROLIMUS 112 MCG/HR: 5 INJECTION, SOLUTION INTRAVENOUS at 21:07

## 2021-08-07 RX ADMIN — STANDARDIZED SENNA CONCENTRATE 8.6 MG: 8.6 TABLET ORAL at 03:56

## 2021-08-07 RX ADMIN — FLUCONAZOLE 200 MG: 200 TABLET ORAL at 08:43

## 2021-08-07 RX ADMIN — ALLOPURINOL 300 MG: 300 TABLET ORAL at 08:42

## 2021-08-07 RX ADMIN — Medication 5 ML: at 03:56

## 2021-08-07 RX ADMIN — TACROLIMUS 112 MCG/HR: 5 INJECTION, SOLUTION INTRAVENOUS at 09:00

## 2021-08-07 RX ADMIN — STANDARDIZED SENNA CONCENTRATE 8.6 MG: 8.6 TABLET ORAL at 21:25

## 2021-08-07 RX ADMIN — ONDANSETRON HYDROCHLORIDE 8 MG: 8 TABLET, FILM COATED ORAL at 08:42

## 2021-08-07 RX ADMIN — MAGNESIUM CITRATE 296 ML: 1.75 LIQUID ORAL at 12:48

## 2021-08-07 RX ADMIN — URSODIOL 300 MG: 300 CAPSULE ORAL at 08:43

## 2021-08-07 RX ADMIN — MYCOPHENOLATE MOFETIL 1500 MG: 500 INJECTION, POWDER, LYOPHILIZED, FOR SOLUTION INTRAVENOUS at 21:07

## 2021-08-07 RX ADMIN — POLYETHYLENE GLYCOL 3350 17 G: 17 POWDER, FOR SOLUTION ORAL at 21:22

## 2021-08-07 RX ADMIN — URSODIOL 300 MG: 300 CAPSULE ORAL at 14:26

## 2021-08-07 RX ADMIN — ACYCLOVIR 800 MG: 800 TABLET ORAL at 11:38

## 2021-08-07 RX ADMIN — DEXAMETHASONE 10 MG: 2 TABLET ORAL at 08:42

## 2021-08-07 RX ADMIN — LORAZEPAM 1 MG: 0.5 TABLET ORAL at 21:41

## 2021-08-07 RX ADMIN — ACYCLOVIR 800 MG: 800 TABLET ORAL at 21:08

## 2021-08-07 RX ADMIN — FLUDARABINE PHOSPHATE 70 MG: 25 INJECTION, SOLUTION INTRAVENOUS at 09:33

## 2021-08-07 RX ADMIN — ACYCLOVIR 800 MG: 800 TABLET ORAL at 14:26

## 2021-08-07 RX ADMIN — URSODIOL 300 MG: 300 CAPSULE ORAL at 21:07

## 2021-08-07 RX ADMIN — Medication 5 MG: at 21:41

## 2021-08-07 RX ADMIN — ACYCLOVIR 800 MG: 800 TABLET ORAL at 08:43

## 2021-08-07 RX ADMIN — ACYCLOVIR 800 MG: 800 TABLET ORAL at 18:37

## 2021-08-07 RX ADMIN — PANTOPRAZOLE SODIUM 40 MG: 40 TABLET, DELAYED RELEASE ORAL at 08:42

## 2021-08-07 RX ADMIN — MYCOPHENOLATE MOFETIL 1500 MG: 500 INJECTION, POWDER, LYOPHILIZED, FOR SOLUTION INTRAVENOUS at 08:43

## 2021-08-07 RX ADMIN — ONDANSETRON HYDROCHLORIDE 8 MG: 8 TABLET, FILM COATED ORAL at 16:28

## 2021-08-07 ASSESSMENT — ACTIVITIES OF DAILY LIVING (ADL)
ADLS_ACUITY_SCORE: 14

## 2021-08-07 ASSESSMENT — MIFFLIN-ST. JEOR: SCORE: 1915.98

## 2021-08-07 NOTE — PLAN OF CARE
Care from 3:30 pm to 7 pm  A&Ox4. B/P runs slightly high; within parameters.Denied pain.Good appetite.Up independent to bathroom.Continue with POC.

## 2021-08-07 NOTE — PROGRESS NOTES
SPIRITUAL HEALTH SERVICES Progress Note  Unit 5C Referral from colleague    SH attempted to follow up with pt and spouse about BMT [place] material. Pt/spouse not in room.    Plan: SH will triage to Chaplain Lopez on Monday (8/09) to follow up with pt/spouse about what they would like as part of the [place] Service.    Jamie Fenton   Intern  Pager [phone]

## 2021-08-07 NOTE — PLAN OF CARE
"/71 (BP Location: Left arm)   Pulse 64   Temp 97.5  F (36.4  C) (Axillary)   Resp 16   Ht 1.835 m (6' 0.24\")   Wt 109.9 kg (242 lb 3.2 oz)   SpO2 98%   BMI 32.63 kg/m    Pt's AVSS, no c/o pain, nausea or diarrhea during the shift. Pt did take PRN Miralax and senna to help with constipation. Pt stated no BM since the admission. Pt  is up independently in room and voiding good amount. Am lab stable with K+ 4.2, Mag 2.6, Phos 2.7, , WBC 8.4, Hgb 9.1 and Plt 254. Left chest Mcknight intact and Hep locked. Keep monitoring as ordered and notify MD with any new changes.   Problem: Adult Inpatient Plan of Care  Goal: Plan of Care Review  Outcome: No Change  Goal: Patient-Specific Goal (Individualized)  Outcome: No Change  Goal: Absence of Hospital-Acquired Illness or Injury  Outcome: No Change  Intervention: Prevent and Manage VTE (Venous Thromboembolism) Risk  Recent Flowsheet Documentation  Taken 8/7/2021 0000 by Karthik Luna RN  VTE Prevention/Management: ambulation promoted  Goal: Optimal Comfort and Wellbeing  Outcome: No Change  Goal: Readiness for Transition of Care  Outcome: No Change     Problem: Adult Inpatient Plan of Care  Goal: Absence of Hospital-Acquired Illness or Injury  Intervention: Prevent and Manage VTE (Venous Thromboembolism) Risk  Recent Flowsheet Documentation  Taken 8/7/2021 0000 by Karthik Luna RN  VTE Prevention/Management: ambulation promoted     Problem: Adult Inpatient Plan of Care  Goal: Optimal Comfort and Wellbeing  Outcome: No Change     Problem: Adult Inpatient Plan of Care  Goal: Readiness for Transition of Care  Outcome: No Change     Problem: Adjustment to Transplant (Stem Cell/Bone Marrow Transplant)  Goal: Optimal Coping with Transplant  Outcome: No Change     Problem: Bladder Irritation (Stem Cell/Bone Marrow Transplant)  Goal: Symptom-Free Urinary Elimination  Outcome: No Change     Problem: Diarrhea (Stem Cell/Bone Marrow Transplant)  Goal: Diarrhea " Symptom Control  Outcome: No Change     Problem: Fatigue (Stem Cell/Bone Marrow Transplant)  Goal: Energy Level Supports Daily Activity  Outcome: No Change     Problem: Hematologic Alteration (Stem Cell/Bone Marrow Transplant)  Goal: Blood Counts Within Acceptable Range  Outcome: No Change     Problem: Hypersensitivity Reaction (Stem Cell/Bone Marrow Transplant)  Goal: Absence of Hypersensitivity Reaction  Outcome: No Change     Problem: Infection Risk (Stem Cell/Bone Marrow Transplant)  Goal: Absence of Infection  Outcome: No Change     Problem: Nausea and Vomiting (Stem Cell/Bone Marrow Transplant)  Goal: Nausea and Vomiting Symptom Relief  Outcome: No Change     Problem: Nutrition Intake Altered (Stem Cell/Bone Marrow Transplant)  Goal: Optimal Nutrition Intake  Outcome: No Change     Problem: Discharge Planning  Goal: Discharge Planning (Adult, OB, Behavioral, Peds)  Outcome: No Change

## 2021-08-07 NOTE — PROGRESS NOTES
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  Has not had a BM since admission, feeling more constipated.  No abd pain.  PO intake good.  No new concerns today.       Review of Systems    Review of systems was negative other than noted above.     PHYSICAL EXAM      Weight     Wt Readings from Last 3 Encounters:   08/07/21 107.9 kg (237 lb 14.4 oz)   07/19/21 108 kg (238 lb)   07/13/21 108 kg (238 lb)          BP (!) 152/80 (BP Location: Left arm)   Pulse 66   Temp 97.5  F (36.4  C) (Axillary)   Resp 16   Ht 1.835 m (6' 0.24\")   Wt 107.9 kg (237 lb 14.4 oz)   SpO2 98%   BMI 32.05 kg/m       KPS: 90  General: NAD   Eyes: YARELIS, sclera anicteric   Nose/Mouth/Throat: OP clear, buccal mucosa moist, no ulcerations   Lungs: CTA bilaterally  Cardiovascular: RRR, no M/R/G    Abdominal/Rectal: Hypoactive BS, abd soft, NT, ND  Lymphatics: No edema  Skin: No rashes or petechaie  Neuro: A&O   Vascular Access: port in the right chest, left double lumen blake    Labs:  CBCRecent Labs   Lab 08/07/21  0402 08/06/21  0439 08/05/21  0419 08/04/21  0352   WBC 8.4 8.2 7.2 5.4   RBC 2.75* 2.77* 2.97* 2.94*   HGB 9.1* 9.3* 9.8* 9.7*   HCT 27.4* 26.8* 29.3* 29.9*    97 99 102*   MCH 33.1* 33.6* 33.0 33.0   MCHC 33.2 34.7 33.4 32.4   RDW 15.3* 15.1* 15.3* 16.1*    239 237 257     CMP  Recent Labs   Lab 08/07/21  0402 08/06/21  0439 08/05/21  1620 08/05/21  0751 08/05/21  0419 08/04/21  0351 08/03/21  0932    131*  131* 129* 132* 129*  129* 140 140   POTASSIUM 4.2 3.9  3.9 3.9  --  3.6  3.6 3.5 4.1  4.1   CHLORIDE 108 100  --   --  98 108 110*   CO2 24 24  --   --  23 26 25   ANIONGAP 6 7  --   --  8 6 5   * 110*  --   --  115* 136* 100*   BUN 25 23  --   --  19 19 28   CR 1.07 0.90  --   --  0.91 0.86 1.08   GFRESTIMATED 73 >90  --   --  89 >90 73   DAMON 8.5 7.9*  --   --  8.1* 8.9 9.5 "   MAG 2.6* 2.3  --   --  1.7 2.1 2.1   PHOS 2.7 2.9  --   --  3.4  --  3.1   PROTTOTAL  --   --   --   --   --   --  7.4   ALBUMIN  --   --   --   --   --   --  3.9   BILITOTAL  --   --   --   --   --   --  0.5   ALKPHOS  --   --   --   --   --   --  58   AST  --   --   --   --   --   --  22   ALT  --   --   --   --   --   --  26     INR  Recent Labs   Lab 08/03/21  0932   INR 0.92       OVERALL ASSESSMENT AND PLAN   Nik Nava is a 61 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  Here for flu/cy/TBI and sib allo sct  HCT-CI score: 3 (prior solid tumor)  - allopurinol through day 0  - GCSF starts on day +5 until ANC >2500 x 2    2.  Recent pulmonary emboli: He developed a pulmonary emboli in the setting of receiving PEG asparaginase.  on xarelto. Will continue until plt count < 50k.     3.  History of kidney cancer: He has a history of renal cancer and has a prior resection.  For that reason he has a single unilateral kidney.     4.  Dental clearance: Ok to proceed    5.  GVHD: Tac/MMF to to start on day -3; tac level on day -1    6.  ID: prophy acv/letermovir, fluconazole  - levaquin begin day -1 and continue until engraftment and off G-CSF  - sulfa allergic; pentamidine at day +28    7.  Heme:  Transfuse for hgb <7g/dL; plt < 10k    8. Hx of graves disease; On synthroid    9.  FEN/renal:  - cytoxan induced hyponatremia; resolved    10. GI: Constipation. Add 1x dose mag citrate to bowel regimen today.       Tere Barnes DNP, APRN, NP-C  Blood & Marrow Transplant  Pager: [phone]

## 2021-08-07 NOTE — PROGRESS NOTES
Cross cover    Paged saying patient requesting Senparth simeon. This was ordered.     Dr. Zeina Philip  Internal Medicine/Pediatrics      This note was created using voice recognition software and may contain minor errors.

## 2021-08-07 NOTE — PLAN OF CARE
Problem: Adult Inpatient Plan of Care  Goal: Plan of Care Review  Outcome: No Change  Goal: Patient-Specific Goal (Individualized)  Outcome: No Change  Goal: Absence of Hospital-Acquired Illness or Injury  Outcome: No Change  Intervention: Identify and Manage Fall Risk  Recent Flowsheet Documentation  Taken 8/7/2021 0800 by Chelo Nicholas RN  Safety Promotion/Fall Prevention:   lighting adjusted   nonskid shoes/slippers when out of bed  Intervention: Prevent Skin Injury  Recent Flowsheet Documentation  Taken 8/7/2021 0800 by Chelo Nicholas RN  Body Position: position changed independently  Intervention: Prevent and Manage VTE (Venous Thromboembolism) Risk  Recent Flowsheet Documentation  Taken 8/7/2021 0800 by Chelo Nicholas RN  VTE Prevention/Management:   ambulation promoted   bleeding risk assessed  Afebrile. Heart rate 57. Intermittent bradycardia. Blood pressure's 140's-150's/70's-90's and provider notified. Received Fludarabine. Started on tacrolimus and mmf this morning. Tacrolimus infusing at 112 mcg/hr. Voiding well. Received magnesium citrate for constipation. Passing flatus and had a very small stool. Independent.

## 2021-08-08 LAB
ANION GAP SERPL CALCULATED.3IONS-SCNC: 2 MMOL/L (ref 3–14)
BASOPHILS # BLD AUTO: 0 10E3/UL (ref 0–0.2)
BASOPHILS NFR BLD AUTO: 0 %
BUN SERPL-MCNC: 29 MG/DL (ref 7–30)
CALCIUM SERPL-MCNC: 8.5 MG/DL (ref 8.5–10.1)
CHLORIDE BLD-SCNC: 110 MMOL/L (ref 94–109)
CO2 SERPL-SCNC: 26 MMOL/L (ref 20–32)
CREAT SERPL-MCNC: 0.96 MG/DL (ref 0.66–1.25)
EOSINOPHIL # BLD AUTO: 0 10E3/UL (ref 0–0.7)
EOSINOPHIL NFR BLD AUTO: 0 %
ERYTHROCYTE [DISTWIDTH] IN BLOOD BY AUTOMATED COUNT: 15.2 % (ref 10–15)
GFR SERPL CREATININE-BSD FRML MDRD: 84 ML/MIN/1.73M2
GLUCOSE BLD-MCNC: 110 MG/DL (ref 70–99)
HCT VFR BLD AUTO: 27.4 % (ref 40–53)
HGB BLD-MCNC: 9 G/DL (ref 13.3–17.7)
IMM GRANULOCYTES # BLD: 0.1 10E3/UL
IMM GRANULOCYTES NFR BLD: 1 %
LYMPHOCYTES # BLD AUTO: 0 10E3/UL (ref 0.8–5.3)
LYMPHOCYTES NFR BLD AUTO: 0 %
MCH RBC QN AUTO: 32.7 PG (ref 26.5–33)
MCHC RBC AUTO-ENTMCNC: 32.8 G/DL (ref 31.5–36.5)
MCV RBC AUTO: 100 FL (ref 78–100)
MONOCYTES # BLD AUTO: 0.1 10E3/UL (ref 0–1.3)
MONOCYTES NFR BLD AUTO: 1 %
NEUTROPHILS # BLD AUTO: 7.4 10E3/UL (ref 1.6–8.3)
NEUTROPHILS NFR BLD AUTO: 98 %
NRBC # BLD AUTO: 0 10E3/UL
NRBC BLD AUTO-RTO: 0 /100
PLATELET # BLD AUTO: 245 10E3/UL (ref 150–450)
POTASSIUM BLD-SCNC: 4.2 MMOL/L (ref 3.4–5.3)
RBC # BLD AUTO: 2.75 10E6/UL (ref 4.4–5.9)
SODIUM SERPL-SCNC: 138 MMOL/L (ref 133–144)
WBC # BLD AUTO: 7.6 10E3/UL (ref 4–11)

## 2021-08-08 PROCEDURE — 250N000012 HC RX MED GY IP 250 OP 636 PS 637: Performed by: INTERNAL MEDICINE

## 2021-08-08 PROCEDURE — 250N000013 HC RX MED GY IP 250 OP 250 PS 637: Performed by: INTERNAL MEDICINE

## 2021-08-08 PROCEDURE — 250N000013 HC RX MED GY IP 250 OP 250 PS 637: Performed by: PHYSICIAN ASSISTANT

## 2021-08-08 PROCEDURE — 250N000013 HC RX MED GY IP 250 OP 250 PS 637: Performed by: STUDENT IN AN ORGANIZED HEALTH CARE EDUCATION/TRAINING PROGRAM

## 2021-08-08 PROCEDURE — 85025 COMPLETE CBC W/AUTO DIFF WBC: CPT | Performed by: INTERNAL MEDICINE

## 2021-08-08 PROCEDURE — 82565 ASSAY OF CREATININE: CPT | Performed by: INTERNAL MEDICINE

## 2021-08-08 PROCEDURE — 99233 SBSQ HOSP IP/OBS HIGH 50: CPT | Performed by: INTERNAL MEDICINE

## 2021-08-08 PROCEDURE — 250N000011 HC RX IP 250 OP 636: Performed by: INTERNAL MEDICINE

## 2021-08-08 PROCEDURE — 206N000001 HC R&B BMT UMMC

## 2021-08-08 PROCEDURE — 250N000013 HC RX MED GY IP 250 OP 250 PS 637: Performed by: NURSE PRACTITIONER

## 2021-08-08 PROCEDURE — 250N000009 HC RX 250: Performed by: INTERNAL MEDICINE

## 2021-08-08 PROCEDURE — 258N000003 HC RX IP 258 OP 636: Performed by: INTERNAL MEDICINE

## 2021-08-08 RX ORDER — SENNOSIDES 8.6 MG
2-4 TABLET ORAL 2 TIMES DAILY
Status: DISCONTINUED | OUTPATIENT
Start: 2021-08-08 | End: 2021-08-12

## 2021-08-08 RX ORDER — AMLODIPINE BESYLATE 5 MG/1
5 TABLET ORAL DAILY
Status: DISCONTINUED | OUTPATIENT
Start: 2021-08-08 | End: 2021-08-22 | Stop reason: HOSPADM

## 2021-08-08 RX ADMIN — SODIUM CHLORIDE, PRESERVATIVE FREE 10 ML: 5 INJECTION INTRAVENOUS at 12:46

## 2021-08-08 RX ADMIN — PANTOPRAZOLE SODIUM 40 MG: 40 TABLET, DELAYED RELEASE ORAL at 08:22

## 2021-08-08 RX ADMIN — URSODIOL 300 MG: 300 CAPSULE ORAL at 20:17

## 2021-08-08 RX ADMIN — Medication 5 MG: at 21:22

## 2021-08-08 RX ADMIN — ACYCLOVIR 800 MG: 800 TABLET ORAL at 18:02

## 2021-08-08 RX ADMIN — ACYCLOVIR 800 MG: 800 TABLET ORAL at 21:22

## 2021-08-08 RX ADMIN — TACROLIMUS 112 MCG/HR: 5 INJECTION, SOLUTION INTRAVENOUS at 20:09

## 2021-08-08 RX ADMIN — URSODIOL 300 MG: 300 CAPSULE ORAL at 08:22

## 2021-08-08 RX ADMIN — ACYCLOVIR 800 MG: 800 TABLET ORAL at 13:43

## 2021-08-08 RX ADMIN — POLYETHYLENE GLYCOL 3350 17 G: 17 POWDER, FOR SOLUTION ORAL at 08:23

## 2021-08-08 RX ADMIN — RIVAROXABAN 20 MG: 20 TABLET, FILM COATED ORAL at 18:00

## 2021-08-08 RX ADMIN — LORAZEPAM 1 MG: 0.5 TABLET ORAL at 21:22

## 2021-08-08 RX ADMIN — LEVOTHYROXINE SODIUM 200 MCG: 0.05 TABLET ORAL at 08:21

## 2021-08-08 RX ADMIN — ACYCLOVIR 800 MG: 800 TABLET ORAL at 11:29

## 2021-08-08 RX ADMIN — STANDARDIZED SENNA CONCENTRATE 8.6 MG: 8.6 TABLET ORAL at 08:23

## 2021-08-08 RX ADMIN — MYCOPHENOLATE MOFETIL 1500 MG: 500 INJECTION, POWDER, LYOPHILIZED, FOR SOLUTION INTRAVENOUS at 08:23

## 2021-08-08 RX ADMIN — ONDANSETRON HYDROCHLORIDE 8 MG: 8 TABLET, FILM COATED ORAL at 16:31

## 2021-08-08 RX ADMIN — STANDARDIZED SENNA CONCENTRATE 2 TABLET: 8.6 TABLET ORAL at 20:21

## 2021-08-08 RX ADMIN — URSODIOL 300 MG: 300 CAPSULE ORAL at 13:43

## 2021-08-08 RX ADMIN — MYCOPHENOLATE MOFETIL 1500 MG: 500 INJECTION, POWDER, LYOPHILIZED, FOR SOLUTION INTRAVENOUS at 20:11

## 2021-08-08 RX ADMIN — ONDANSETRON HYDROCHLORIDE 8 MG: 8 TABLET, FILM COATED ORAL at 08:22

## 2021-08-08 RX ADMIN — AMLODIPINE BESYLATE 5 MG: 5 TABLET ORAL at 08:22

## 2021-08-08 RX ADMIN — FLUCONAZOLE 200 MG: 200 TABLET ORAL at 08:22

## 2021-08-08 RX ADMIN — ALLOPURINOL 300 MG: 300 TABLET ORAL at 08:22

## 2021-08-08 RX ADMIN — ACYCLOVIR 800 MG: 800 TABLET ORAL at 08:22

## 2021-08-08 RX ADMIN — FLUDARABINE PHOSPHATE 70 MG: 25 INJECTION, SOLUTION INTRAVENOUS at 09:26

## 2021-08-08 RX ADMIN — DEXAMETHASONE 8 MG: 4 TABLET ORAL at 08:22

## 2021-08-08 RX ADMIN — ONDANSETRON HYDROCHLORIDE 8 MG: 8 TABLET, FILM COATED ORAL at 00:31

## 2021-08-08 ASSESSMENT — ACTIVITIES OF DAILY LIVING (ADL)
ADLS_ACUITY_SCORE: 14

## 2021-08-08 ASSESSMENT — MIFFLIN-ST. JEOR: SCORE: 1906.45

## 2021-08-08 NOTE — PLAN OF CARE
"  Blood pressure (!) 142/76, pulse 66, temperature 97.6  F (36.4  C), temperature source Axillary, resp. rate 18, height 1.835 m (6' 0.24\"), weight 107.9 kg (237 lb 14.4 oz), SpO2 93 %.    Very pleasant gentleman, A/O x 4. Pt continue to be hypertensive, 152/74, 152/79 and 142/76 this morning. OVSS as recorded above.  Pt will be starting Amlodipine this morning. Pt requested 1 mg of po Ativan and Melatonin at HS with good result, sleeping between cares.He is voiding OK . Main complain is constipation and he got Miralax and senna with no result yet. Tac gtt is going at 112 mc/hour = 5.6 ML/hour. Lab results are WNL for pt, no replacements needed. Pt will be getting Chemotherapy, Fludarabine at 09:00 AM  today. Continue to monitor pt and follow plan of care.      Problem: Adult Inpatient Plan of Care  Goal: Plan of Care Review  Outcome: No Change     Problem: Adult Inpatient Plan of Care  Goal: Patient-Specific Goal (Individualized)  Outcome: No Change     Problem: Adult Inpatient Plan of Care  Goal: Optimal Comfort and Wellbeing  Outcome: No Change     Problem: Adult Inpatient Plan of Care  Goal: Absence of Hospital-Acquired Illness or Injury  Intervention: Prevent and Manage VTE (Venous Thromboembolism) Risk  Recent Flowsheet Documentation  Taken 8/8/2021 0400 by Alberta Marrero RN  VTE Prevention/Management:   ambulation promoted   bleeding precautions maintained  Taken 8/7/2021 2000 by Alberta Marrero RN  VTE Prevention/Management:   ambulation promoted   bleeding precautions maintained     Problem: Mucositis (Stem Cell/Bone Marrow Transplant)  Goal: Mucous Membrane Health and Integrity  Intervention: Maintain Oral Mucous Membrane Integrity  Recent Flowsheet Documentation  Taken 8/8/2021 0400 by Alberta Marrero RN  Oral Care: lip lubricant applied  Taken 8/7/2021 2000 by Alberta Marrero RN  Oral Care: lip lubricant applied     "

## 2021-08-08 NOTE — PLAN OF CARE
Problem: Adult Inpatient Plan of Care  Goal: Plan of Care Review  Outcome: No Change  Goal: Patient-Specific Goal (Individualized)  Outcome: No Change  Goal: Absence of Hospital-Acquired Illness or Injury  Intervention: Identify and Manage Fall Risk  Recent Flowsheet Documentation  Taken 8/8/2021 0800 by Chelo Nicholas RN  Safety Promotion/Fall Prevention:    activity supervised    clutter free environment maintained  Intervention: Prevent Skin Injury  Recent Flowsheet Documentation  Taken 8/8/2021 0800 by Chelo Nicholas RN  Body Position: position changed independently  Intervention: Prevent and Manage VTE (Venous Thromboembolism) Risk  Recent Flowsheet Documentation  Taken 8/8/2021 0800 by Chelo Nicholas RN  VTE Prevention/Management:    ambulation promoted    bleeding risk assessed  Blood pressure 159/87 and received norvasc. Bp recheck 150/87. Using nicorette gum for craving. No nausea. No pain. Tacrolimus drip infusing at 112 mcg/hr. Ate ok. Voiding well. Had a little bm per patient report and received senna and miralax. Showered. Spouse at bedside.

## 2021-08-08 NOTE — PROGRESS NOTES
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 Had a couple small BMs, nothing substantial at this point, but does not have pain or discomfort. Good PO intake. UAL, active. Chronic mild b/l ankle edema; wife bringing compression socks today.     Review of Systems    Review of systems was negative other than noted above.     PHYSICAL EXAM      Weight     Wt Readings from Last 3 Encounters:   08/08/21 107 kg (235 lb 12.8 oz)   07/19/21 108 kg (238 lb)   07/13/21 108 kg (238 lb)          BP (!) 150/87 (BP Location: Left arm)   Pulse 74   Temp 97.5  F (36.4  C) (Axillary)   Resp 16   Ht 1.835 m (6' 0.24\")   Wt 107 kg (235 lb 12.8 oz)   SpO2 99%   BMI 31.76 kg/m       KPS: 90  General: NAD   Eyes: YARELIS, sclera anicteric   Nose/Mouth/Throat: OP clear, buccal mucosa moist, no ulcerations   Lungs: CTA bilaterally  Cardiovascular: RRR, no M/R/G    Abdominal/Rectal: Hypoactive BS, abd soft, NT, ND  Lymphatics: Trace non pitting b/l ankle edema, strong pedal pulses  Skin: No rashes or petechaie  Neuro: A&O, speech normal  Vascular Access: port in the right chest, left double lumen blake    Labs:  CBC  Recent Labs   Lab 08/08/21 0321 08/07/21  0402 08/06/21  0439 08/05/21  0419   WBC 7.6 8.4 8.2 7.2   RBC 2.75* 2.75* 2.77* 2.97*   HGB 9.0* 9.1* 9.3* 9.8*   HCT 27.4* 27.4* 26.8* 29.3*    100 97 99   MCH 32.7 33.1* 33.6* 33.0   MCHC 32.8 33.2 34.7 33.4   RDW 15.2* 15.3* 15.1* 15.3*    254 239 237     CMP  Recent Labs   Lab 08/08/21  0321 08/07/21  0402 08/06/21  0439 08/05/21  1620 08/05/21  0419 08/04/21  0351 08/03/21  0932    138 131*  131* 129* 129*  129* 140 140   POTASSIUM 4.2 4.2 3.9  3.9 3.9 3.6  3.6 3.5 4.1  4.1   CHLORIDE 110* 108 100  --  98 108 110*   CO2 26 24 24  --  23 26 25   ANIONGAP 2* 6 7  --  8 6 5   * 116* 110*  --  115* 136* 100*   BUN 29 25 23  -- "  19 19 28   CR 0.96 1.07 0.90  --  0.91 0.86 1.08   GFRESTIMATED 84 73 >90  --  89 >90 73   DAMON 8.5 8.5 7.9*  --  8.1* 8.9 9.5   MAG  --  2.6* 2.3  --  1.7 2.1 2.1   PHOS  --  2.7 2.9  --  3.4  --  3.1   PROTTOTAL  --   --   --   --   --   --  7.4   ALBUMIN  --   --   --   --   --   --  3.9   BILITOTAL  --   --   --   --   --   --  0.5   ALKPHOS  --   --   --   --   --   --  58   AST  --   --   --   --   --   --  22   ALT  --   --   --   --   --   --  26     INR  Recent Labs   Lab 08/03/21  0932   INR 0.92       OVERALL ASSESSMENT AND PLAN   Nik Nava is a 61 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  Here for flu/cy/TBI and sib allo sct  HCT-CI score: 3 (prior solid tumor)  - allopurinol through day 0  - GCSF starts on day +5 until ANC >2500 x 2    2.  Recent pulmonary emboli: He developed a pulmonary emboli in the setting of receiving PEG asparaginase.  On xarelto. Will continue until plt count < 50k.     3.  History of kidney cancer: He has a history of renal cancer and has a prior resection.  For that reason he has a single unilateral kidney.     4.  Dental clearance: Ok to proceed    5.  GVHD: Tac/MMF to to start on day -3; tac level on day -1 (8/9)    6.  ID: prophy acv/letermovir, fluconazole  - levaquin begin day -1 and continue until engraftment and off G-CSF  - sulfa allergic; pentamidine at day +28    7.  Heme:  Transfuse for hgb <7g/dL; plt < 10k    8. Hx of graves disease; On synthroid    9.  FEN/renal:  - cytoxan induced hyponatremia; resolved    10. GI: Constipation. Schedule senna BID, miralax PRN.     11. CV: Hypertension. Likely secondary to tacrolimus. SBP consistently 140-150+. Start norvasc 5mg daily. Monitor.       Tere Barnes DNP, APRN,  NP-C  Blood & Marrow Transplant  Pager: [phone]

## 2021-08-09 ENCOUNTER — APPOINTMENT (OUTPATIENT)
Dept: RADIATION ONCOLOGY | Facility: CLINIC | Age: 61
End: 2021-08-09
Attending: RADIOLOGY
Payer: COMMERCIAL

## 2021-08-09 ENCOUNTER — ONCOLOGY VISIT (OUTPATIENT)
Dept: RADIATION ONCOLOGY | Facility: CLINIC | Age: 61
End: 2021-08-09

## 2021-08-09 ENCOUNTER — APPOINTMENT (OUTPATIENT)
Dept: OCCUPATIONAL THERAPY | Facility: CLINIC | Age: 61
End: 2021-08-09
Attending: INTERNAL MEDICINE
Payer: COMMERCIAL

## 2021-08-09 LAB
ALBUMIN SERPL-MCNC: 3.3 G/DL (ref 3.4–5)
ALP SERPL-CCNC: 56 U/L (ref 40–150)
ALT SERPL W P-5'-P-CCNC: 22 U/L (ref 0–70)
ANION GAP SERPL CALCULATED.3IONS-SCNC: 4 MMOL/L (ref 3–14)
AST SERPL W P-5'-P-CCNC: 16 U/L (ref 0–45)
BASOPHILS # BLD AUTO: 0 10E3/UL (ref 0–0.2)
BASOPHILS NFR BLD AUTO: 0 %
BILIRUB DIRECT SERPL-MCNC: <0.1 MG/DL (ref 0–0.2)
BILIRUB SERPL-MCNC: 0.4 MG/DL (ref 0.2–1.3)
BUN SERPL-MCNC: 30 MG/DL (ref 7–30)
CALCIUM SERPL-MCNC: 8.6 MG/DL (ref 8.5–10.1)
CHLORIDE BLD-SCNC: 108 MMOL/L (ref 94–109)
CO2 SERPL-SCNC: 26 MMOL/L (ref 20–32)
CREAT SERPL-MCNC: 1.05 MG/DL (ref 0.66–1.25)
EOSINOPHIL # BLD AUTO: 0 10E3/UL (ref 0–0.7)
EOSINOPHIL NFR BLD AUTO: 0 %
ERYTHROCYTE [DISTWIDTH] IN BLOOD BY AUTOMATED COUNT: 14.9 % (ref 10–15)
GFR SERPL CREATININE-BSD FRML MDRD: 75 ML/MIN/1.73M2
GLUCOSE BLD-MCNC: 116 MG/DL (ref 70–99)
HCT VFR BLD AUTO: 28.6 % (ref 40–53)
HGB BLD-MCNC: 9.5 G/DL (ref 13.3–17.7)
IMM GRANULOCYTES # BLD: 0.1 10E3/UL
IMM GRANULOCYTES NFR BLD: 1 %
INR PPP: 1.45 (ref 0.85–1.15)
LYMPHOCYTES # BLD AUTO: 0 10E3/UL (ref 0.8–5.3)
LYMPHOCYTES NFR BLD AUTO: 0 %
MAGNESIUM SERPL-MCNC: 2.4 MG/DL (ref 1.6–2.3)
MCH RBC QN AUTO: 33 PG (ref 26.5–33)
MCHC RBC AUTO-ENTMCNC: 33.2 G/DL (ref 31.5–36.5)
MCV RBC AUTO: 99 FL (ref 78–100)
MONOCYTES # BLD AUTO: 0 10E3/UL (ref 0–1.3)
MONOCYTES NFR BLD AUTO: 1 %
NEUTROPHILS # BLD AUTO: 5.5 10E3/UL (ref 1.6–8.3)
NEUTROPHILS NFR BLD AUTO: 98 %
NRBC # BLD AUTO: 0 10E3/UL
NRBC BLD AUTO-RTO: 0 /100
PHOSPHATE SERPL-MCNC: 2.9 MG/DL (ref 2.5–4.5)
PLATELET # BLD AUTO: 240 10E3/UL (ref 150–450)
POTASSIUM BLD-SCNC: 4 MMOL/L (ref 3.4–5.3)
PROT SERPL-MCNC: 6.4 G/DL (ref 6.8–8.8)
RBC # BLD AUTO: 2.88 10E6/UL (ref 4.4–5.9)
SODIUM SERPL-SCNC: 138 MMOL/L (ref 133–144)
TACROLIMUS BLD-MCNC: 8.5 UG/L (ref 5–15)
TME LAST DOSE: NORMAL H
TME LAST DOSE: NORMAL H
WBC # BLD AUTO: 5.6 10E3/UL (ref 4–11)

## 2021-08-09 PROCEDURE — 250N000013 HC RX MED GY IP 250 OP 250 PS 637: Performed by: NURSE PRACTITIONER

## 2021-08-09 PROCEDURE — 85025 COMPLETE CBC W/AUTO DIFF WBC: CPT | Performed by: INTERNAL MEDICINE

## 2021-08-09 PROCEDURE — 250N000013 HC RX MED GY IP 250 OP 250 PS 637: Performed by: INTERNAL MEDICINE

## 2021-08-09 PROCEDURE — 250N000009 HC RX 250: Performed by: INTERNAL MEDICINE

## 2021-08-09 PROCEDURE — 250N000012 HC RX MED GY IP 250 OP 636 PS 637: Performed by: INTERNAL MEDICINE

## 2021-08-09 PROCEDURE — 97110 THERAPEUTIC EXERCISES: CPT | Mod: GO

## 2021-08-09 PROCEDURE — 77412 RADIATION TX DELIVERY LVL 3: CPT | Performed by: RADIOLOGY

## 2021-08-09 PROCEDURE — 250N000011 HC RX IP 250 OP 636: Performed by: PHYSICIAN ASSISTANT

## 2021-08-09 PROCEDURE — 84100 ASSAY OF PHOSPHORUS: CPT | Performed by: INTERNAL MEDICINE

## 2021-08-09 PROCEDURE — 77336 RADIATION PHYSICS CONSULT: CPT | Performed by: RADIOLOGY

## 2021-08-09 PROCEDURE — 250N000013 HC RX MED GY IP 250 OP 250 PS 637: Performed by: PHYSICIAN ASSISTANT

## 2021-08-09 PROCEDURE — 250N000011 HC RX IP 250 OP 636: Performed by: INTERNAL MEDICINE

## 2021-08-09 PROCEDURE — 85610 PROTHROMBIN TIME: CPT | Performed by: INTERNAL MEDICINE

## 2021-08-09 PROCEDURE — 77431 RADIATION THERAPY MANAGEMENT: CPT | Performed by: RADIOLOGY

## 2021-08-09 PROCEDURE — 80197 ASSAY OF TACROLIMUS: CPT | Performed by: INTERNAL MEDICINE

## 2021-08-09 PROCEDURE — 77331 SPECIAL RADIATION DOSIMETRY: CPT | Performed by: RADIOLOGY

## 2021-08-09 PROCEDURE — 82248 BILIRUBIN DIRECT: CPT | Performed by: INTERNAL MEDICINE

## 2021-08-09 PROCEDURE — 80048 BASIC METABOLIC PNL TOTAL CA: CPT | Performed by: INTERNAL MEDICINE

## 2021-08-09 PROCEDURE — 258N000003 HC RX IP 258 OP 636: Performed by: PHYSICIAN ASSISTANT

## 2021-08-09 PROCEDURE — 77332 RADIATION TREATMENT AID(S): CPT | Performed by: RADIOLOGY

## 2021-08-09 PROCEDURE — 99233 SBSQ HOSP IP/OBS HIGH 50: CPT | Performed by: INTERNAL MEDICINE

## 2021-08-09 PROCEDURE — 206N000001 HC R&B BMT UMMC

## 2021-08-09 PROCEDURE — 258N000003 HC RX IP 258 OP 636: Performed by: INTERNAL MEDICINE

## 2021-08-09 PROCEDURE — 77332 RADIATION TREATMENT AID(S): CPT | Mod: 26 | Performed by: RADIOLOGY

## 2021-08-09 PROCEDURE — 77331 SPECIAL RADIATION DOSIMETRY: CPT | Mod: 26 | Performed by: RADIOLOGY

## 2021-08-09 PROCEDURE — 83735 ASSAY OF MAGNESIUM: CPT | Performed by: INTERNAL MEDICINE

## 2021-08-09 RX ORDER — SODIUM CHLORIDE 9 MG/ML
INJECTION, SOLUTION INTRAVENOUS CONTINUOUS
Status: ACTIVE | OUTPATIENT
Start: 2021-08-10 | End: 2021-08-10

## 2021-08-09 RX ADMIN — RIVAROXABAN 20 MG: 20 TABLET, FILM COATED ORAL at 17:19

## 2021-08-09 RX ADMIN — AMLODIPINE BESYLATE 5 MG: 5 TABLET ORAL at 08:22

## 2021-08-09 RX ADMIN — Medication 5 MG: at 20:43

## 2021-08-09 RX ADMIN — URSODIOL 300 MG: 300 CAPSULE ORAL at 14:28

## 2021-08-09 RX ADMIN — MYCOPHENOLATE MOFETIL 1500 MG: 500 INJECTION, POWDER, LYOPHILIZED, FOR SOLUTION INTRAVENOUS at 08:23

## 2021-08-09 RX ADMIN — ACYCLOVIR 800 MG: 800 TABLET ORAL at 08:22

## 2021-08-09 RX ADMIN — ACYCLOVIR 800 MG: 800 TABLET ORAL at 14:28

## 2021-08-09 RX ADMIN — SODIUM CHLORIDE, PRESERVATIVE FREE 5 ML: 5 INJECTION INTRAVENOUS at 10:15

## 2021-08-09 RX ADMIN — FLUCONAZOLE 200 MG: 200 TABLET ORAL at 08:22

## 2021-08-09 RX ADMIN — DEXAMETHASONE 6 MG: 2 TABLET ORAL at 08:22

## 2021-08-09 RX ADMIN — ONDANSETRON HYDROCHLORIDE 8 MG: 8 TABLET, FILM COATED ORAL at 08:22

## 2021-08-09 RX ADMIN — STANDARDIZED SENNA CONCENTRATE 2 TABLET: 8.6 TABLET ORAL at 20:36

## 2021-08-09 RX ADMIN — LORAZEPAM 1 MG: 0.5 TABLET ORAL at 20:43

## 2021-08-09 RX ADMIN — ACYCLOVIR 800 MG: 800 TABLET ORAL at 17:19

## 2021-08-09 RX ADMIN — ACYCLOVIR 800 MG: 800 TABLET ORAL at 11:22

## 2021-08-09 RX ADMIN — ALLOPURINOL 300 MG: 300 TABLET ORAL at 08:22

## 2021-08-09 RX ADMIN — LEVOFLOXACIN 250 MG: 250 TABLET, FILM COATED ORAL at 11:23

## 2021-08-09 RX ADMIN — LEVOTHYROXINE SODIUM 200 MCG: 0.05 TABLET ORAL at 08:23

## 2021-08-09 RX ADMIN — URSODIOL 300 MG: 300 CAPSULE ORAL at 08:23

## 2021-08-09 RX ADMIN — URSODIOL 300 MG: 300 CAPSULE ORAL at 20:35

## 2021-08-09 RX ADMIN — PANTOPRAZOLE SODIUM 40 MG: 40 TABLET, DELAYED RELEASE ORAL at 08:22

## 2021-08-09 RX ADMIN — MYCOPHENOLATE MOFETIL 1500 MG: 500 INJECTION, POWDER, LYOPHILIZED, FOR SOLUTION INTRAVENOUS at 20:37

## 2021-08-09 RX ADMIN — STANDARDIZED SENNA CONCENTRATE 2 TABLET: 8.6 TABLET ORAL at 08:23

## 2021-08-09 RX ADMIN — TACROLIMUS 120 MCG/HR: 5 INJECTION, SOLUTION INTRAVENOUS at 21:11

## 2021-08-09 RX ADMIN — ONDANSETRON HYDROCHLORIDE 8 MG: 8 TABLET, FILM COATED ORAL at 15:59

## 2021-08-09 RX ADMIN — ACYCLOVIR 800 MG: 800 TABLET ORAL at 20:36

## 2021-08-09 ASSESSMENT — ACTIVITIES OF DAILY LIVING (ADL)
ADLS_ACUITY_SCORE: 14

## 2021-08-09 ASSESSMENT — MIFFLIN-ST. JEOR: SCORE: 1896.93

## 2021-08-09 NOTE — PROGRESS NOTES
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 Had a couple small BMs, nothing substantial at this point, but does not have pain or discomfort. Good PO intake. Remains active.  No new complaints today.    Review of Systems    Review of systems was negative other than noted above.     PHYSICAL EXAM      Weight     Wt Readings from Last 3 Encounters:   08/09/21 106 kg (233 lb 11.2 oz)   07/19/21 108 kg (238 lb)   07/13/21 108 kg (238 lb)          BP (!) 143/81 (BP Location: Left arm)   Pulse 76   Temp 97.6  F (36.4  C) (Oral)   Resp 16   Ht 1.835 m (6' 0.24\")   Wt 106 kg (233 lb 11.2 oz)   SpO2 96%   BMI 31.48 kg/m       KPS: 90  General: NAD   Eyes: YARELIS, sclera anicteric   Nose/Mouth/Throat: OP clear, buccal mucosa moist, no ulcerations   Lungs: CTA bilaterally  Cardiovascular: RRR, no M/R/G    Abdominal/Rectal: Hypoactive BS, abd soft, NT, ND  Lymphatics: Trace non pitting b/l ankle edema, strong pedal pulses  Skin: No rashes or petechaie  Neuro: A&O, speech normal  Vascular Access: port in the right chest, left double lumen blake    Labs:  Lab Results   Component Value Date    WBC 5.6 08/09/2021    ANEU 7.2 07/08/2021    HGB 9.5 (L) 08/09/2021    HCT 28.6 (L) 08/09/2021     08/09/2021     08/09/2021    POTASSIUM 4.0 08/09/2021    CHLORIDE 108 08/09/2021    CO2 26 08/09/2021     (H) 08/09/2021    BUN 30 08/09/2021    CR 1.05 08/09/2021    MAG 2.4 (H) 08/09/2021    INR 1.45 (H) 08/09/2021         OVERALL ASSESSMENT AND PLAN   Nik Nava is a 61 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  Here for flu/cy/TBI and sib " allo sct  HCT-CI score: 3 (prior solid tumor)  - allopurinol through day 0  - GCSF starts on day +5 until ANC >2500 x 2    2.  Recent pulmonary emboli: He developed a pulmonary emboli in the setting of receiving PEG asparaginase.  On xarelto. Will continue until plt count < 50k.     3.  History of kidney cancer: He has a history of renal cancer and has a prior resection.  For that reason he has a single unilateral kidney.     4.  Dental clearance: Ok to proceed    5.  GVHD: Tac/MMF to to start on day -3; tac level on day -1 (8/9)    6.  ID: prophy acv/letermovir, fluconazole  - levaquin begin day -1 and continue until engraftment and off G-CSF  - sulfa allergic; pentamidine at day +28    7.  Heme:  Transfuse for hgb <7g/dL; plt < 10k  - prolonged INR today; doubt nutritional deficit.  Will repeat tomorrow.    8. Hx of graves disease; On synthroid    9.  FEN/renal:  - cytoxan induced hyponatremia; resolved    10. GI: Constipation. Schedule senna BID, miralax PRN.     11. CV: Hypertension. Likely secondary to tacrolimus. Started norvasc 5mg daily. Monitor.       Lashanda Figeuroa pa-c  [phone]

## 2021-08-09 NOTE — PROGRESS NOTES
SPIRITUAL HEALTH SERVICES  SPIRITUAL ASSESSMENT Progress Note  [facility] 5C     REFERRAL SOURCE: [facility] planning    Met with pt to finalize details of [facility] tomorrow and provided emotional and spiritual support.     PLAN: Will communicate care and final details to spiritual care team. Spiritual health services remains available for any follow-up or requests     Jessica Veliz  Chaplain Resident  Pager: [phone]

## 2021-08-09 NOTE — PLAN OF CARE
"/72 (BP Location: Left arm)   Pulse 64   Temp 97.6  F (36.4  C) (Oral)   Resp 16   Ht 1.835 m (6' 0.24\")   Wt 107 kg (235 lb 12.8 oz)   SpO2 93%   BMI 31.76 kg/m    Pt's AVSS, c/o foot pain and refused any pain medications. Pt I sup independently in room and voiding good amount. Tac gtt infusing at 5.6ml/h and MIVF at TKO. NO BM over night and Am lab stable with Hgb 9.5, Plt 240, WBC 5.6, K+ 4.0, Mag 2.4 and . Pt to have TBI today, Keep monitoring pt as ordered and notify MD with any new changes.   Problem: Adult Inpatient Plan of Care  Goal: Plan of Care Review  Outcome: No Change  Goal: Patient-Specific Goal (Individualized)  Outcome: No Change  Goal: Absence of Hospital-Acquired Illness or Injury  Outcome: No Change  Intervention: Identify and Manage Fall Risk  Recent Flowsheet Documentation  Taken 8/9/2021 0000 by Karthik Luna RN  Safety Promotion/Fall Prevention: nonskid shoes/slippers when out of bed  Intervention: Prevent Skin Injury  Recent Flowsheet Documentation  Taken 8/8/2021 2327 by Karthik Luna RN  Body Position: left  Taken 8/8/2021 2000 by Karthik Luna RN  Body Position: position changed independently  Goal: Optimal Comfort and Wellbeing  Outcome: No Change  Goal: Readiness for Transition of Care  Outcome: No Change     Problem: Diarrhea (Stem Cell/Bone Marrow Transplant)  Goal: Diarrhea Symptom Control  Outcome: No Change     Problem: Fatigue (Stem Cell/Bone Marrow Transplant)  Goal: Energy Level Supports Daily Activity  Outcome: No Change     Problem: Hematologic Alteration (Stem Cell/Bone Marrow Transplant)  Goal: Blood Counts Within Acceptable Range  Outcome: No Change     Problem: Infection Risk (Stem Cell/Bone Marrow Transplant)  Goal: Absence of Infection  Outcome: No Change     Problem: Mucositis (Stem Cell/Bone Marrow Transplant)  Goal: Mucous Membrane Health and Integrity  Outcome: No Change     Problem: Nausea and Vomiting (Stem Cell/Bone Marrow " Transplant)  Goal: Nausea and Vomiting Symptom Relief  Outcome: No Change     Problem: Nutrition Intake Altered (Stem Cell/Bone Marrow Transplant)  Goal: Optimal Nutrition Intake  Outcome: No Change     Problem: Discharge Planning  Goal: Discharge Planning (Adult, OB, Behavioral, Peds)  Outcome: No Change

## 2021-08-09 NOTE — PLAN OF CARE
Problem: Adult Inpatient Plan of Care  Goal: Plan of Care Review  Outcome: No Change  Goal: Patient-Specific Goal (Individualized)  Outcome: No Change  Goal: Absence of Hospital-Acquired Illness or Injury  Intervention: Identify and Manage Fall Risk  Recent Flowsheet Documentation  Taken 8/9/2021 0800 by Chelo Nicholas, RN  Safety Promotion/Fall Prevention: nonskid shoes/slippers when out of bed  Intervention: Prevent Skin Injury  Recent Flowsheet Documentation  Taken 8/9/2021 0800 by Chelo Nicholas, RN  Body Position: position changed independently  Vss. TBI x 1. Sore legs and declined pain meds. No nausea. Ate ok. Tacrolimus drip infusing at 120 mcg/hr. Tacrolimus level 8.5. No stool. Independent. Showered. Plan is for transplant tomorrow at 11 am and five bags. Transplant flush will start at 7 am.

## 2021-08-10 LAB
ANION GAP SERPL CALCULATED.3IONS-SCNC: 6 MMOL/L (ref 3–14)
BASOPHILS # BLD AUTO: 0 10E3/UL (ref 0–0.2)
BASOPHILS NFR BLD AUTO: 0 %
BUN SERPL-MCNC: 28 MG/DL (ref 7–30)
CALCIUM SERPL-MCNC: 8.5 MG/DL (ref 8.5–10.1)
CHLORIDE BLD-SCNC: 108 MMOL/L (ref 94–109)
CO2 SERPL-SCNC: 26 MMOL/L (ref 20–32)
CREAT SERPL-MCNC: 0.99 MG/DL (ref 0.66–1.25)
EOSINOPHIL # BLD AUTO: 0 10E3/UL (ref 0–0.7)
EOSINOPHIL NFR BLD AUTO: 0 %
ERYTHROCYTE [DISTWIDTH] IN BLOOD BY AUTOMATED COUNT: 14.6 % (ref 10–15)
GFR SERPL CREATININE-BSD FRML MDRD: 81 ML/MIN/1.73M2
GLUCOSE BLD-MCNC: 103 MG/DL (ref 70–99)
HCT VFR BLD AUTO: 28.5 % (ref 40–53)
HGB BLD-MCNC: 9.5 G/DL (ref 13.3–17.7)
IMM GRANULOCYTES # BLD: 0 10E3/UL
IMM GRANULOCYTES NFR BLD: 1 %
INR PPP: 1.34 (ref 0.85–1.15)
LYMPHOCYTES # BLD AUTO: 0 10E3/UL (ref 0.8–5.3)
LYMPHOCYTES NFR BLD AUTO: 0 %
MCH RBC QN AUTO: 33 PG (ref 26.5–33)
MCHC RBC AUTO-ENTMCNC: 33.3 G/DL (ref 31.5–36.5)
MCV RBC AUTO: 99 FL (ref 78–100)
MONOCYTES # BLD AUTO: 0 10E3/UL (ref 0–1.3)
MONOCYTES NFR BLD AUTO: 1 %
NEUTROPHILS # BLD AUTO: 3.7 10E3/UL (ref 1.6–8.3)
NEUTROPHILS NFR BLD AUTO: 98 %
NRBC # BLD AUTO: 0 10E3/UL
NRBC BLD AUTO-RTO: 0 /100
PLATELET # BLD AUTO: 219 10E3/UL (ref 150–450)
POTASSIUM BLD-SCNC: 4 MMOL/L (ref 3.4–5.3)
RBC # BLD AUTO: 2.88 10E6/UL (ref 4.4–5.9)
SARS-COV-2 RNA RESP QL NAA+PROBE: NEGATIVE
SODIUM SERPL-SCNC: 140 MMOL/L (ref 133–144)
WBC # BLD AUTO: 3.8 10E3/UL (ref 4–11)

## 2021-08-10 PROCEDURE — 250N000013 HC RX MED GY IP 250 OP 250 PS 637: Performed by: NURSE PRACTITIONER

## 2021-08-10 PROCEDURE — 250N000013 HC RX MED GY IP 250 OP 250 PS 637: Performed by: INTERNAL MEDICINE

## 2021-08-10 PROCEDURE — 258N000003 HC RX IP 258 OP 636: Performed by: PHYSICIAN ASSISTANT

## 2021-08-10 PROCEDURE — 250N000012 HC RX MED GY IP 250 OP 636 PS 637: Performed by: INTERNAL MEDICINE

## 2021-08-10 PROCEDURE — 250N000013 HC RX MED GY IP 250 OP 250 PS 637: Performed by: PHYSICIAN ASSISTANT

## 2021-08-10 PROCEDURE — 258N000003 HC RX IP 258 OP 636: Performed by: INTERNAL MEDICINE

## 2021-08-10 PROCEDURE — 250N000009 HC RX 250: Performed by: INTERNAL MEDICINE

## 2021-08-10 PROCEDURE — 206N000001 HC R&B BMT UMMC

## 2021-08-10 PROCEDURE — 815N000001 HC ACQUISITION BONE MARROW RELATED DONOR

## 2021-08-10 PROCEDURE — 30243Y2 TRANSFUSION OF ALLOGENEIC RELATED HEMATOPOIETIC STEM CELLS INTO CENTRAL VEIN, PERCUTANEOUS APPROACH: ICD-10-PCS | Performed by: INTERNAL MEDICINE

## 2021-08-10 PROCEDURE — 80048 BASIC METABOLIC PNL TOTAL CA: CPT | Performed by: INTERNAL MEDICINE

## 2021-08-10 PROCEDURE — 85025 COMPLETE CBC W/AUTO DIFF WBC: CPT | Performed by: INTERNAL MEDICINE

## 2021-08-10 PROCEDURE — 250N000011 HC RX IP 250 OP 636: Performed by: INTERNAL MEDICINE

## 2021-08-10 PROCEDURE — U0005 INFEC AGEN DETEC AMPLI PROBE: HCPCS | Performed by: PHYSICIAN ASSISTANT

## 2021-08-10 PROCEDURE — 38240 TRANSPLT ALLO HCT/DONOR: CPT | Performed by: INTERNAL MEDICINE

## 2021-08-10 PROCEDURE — 250N000011 HC RX IP 250 OP 636: Performed by: PHYSICIAN ASSISTANT

## 2021-08-10 PROCEDURE — 85610 PROTHROMBIN TIME: CPT | Performed by: PHYSICIAN ASSISTANT

## 2021-08-10 RX ADMIN — URSODIOL 300 MG: 300 CAPSULE ORAL at 14:52

## 2021-08-10 RX ADMIN — ONDANSETRON HYDROCHLORIDE 8 MG: 8 TABLET, FILM COATED ORAL at 08:45

## 2021-08-10 RX ADMIN — PANTOPRAZOLE SODIUM 40 MG: 40 TABLET, DELAYED RELEASE ORAL at 08:45

## 2021-08-10 RX ADMIN — ONDANSETRON HYDROCHLORIDE 8 MG: 8 TABLET, FILM COATED ORAL at 23:11

## 2021-08-10 RX ADMIN — DEXAMETHASONE 4 MG: 4 TABLET ORAL at 08:45

## 2021-08-10 RX ADMIN — TACROLIMUS 120 MCG/HR: 5 INJECTION, SOLUTION INTRAVENOUS at 19:51

## 2021-08-10 RX ADMIN — STANDARDIZED SENNA CONCENTRATE 2 TABLET: 8.6 TABLET ORAL at 19:51

## 2021-08-10 RX ADMIN — ALLOPURINOL 300 MG: 300 TABLET ORAL at 08:45

## 2021-08-10 RX ADMIN — ACETAMINOPHEN 650 MG: 325 TABLET, FILM COATED ORAL at 10:55

## 2021-08-10 RX ADMIN — ACYCLOVIR 800 MG: 800 TABLET ORAL at 08:45

## 2021-08-10 RX ADMIN — ONDANSETRON HYDROCHLORIDE 8 MG: 8 TABLET, FILM COATED ORAL at 16:10

## 2021-08-10 RX ADMIN — LORAZEPAM 1 MG: 0.5 TABLET ORAL at 20:06

## 2021-08-10 RX ADMIN — MYCOPHENOLATE MOFETIL 1500 MG: 500 INJECTION, POWDER, LYOPHILIZED, FOR SOLUTION INTRAVENOUS at 08:44

## 2021-08-10 RX ADMIN — FLUCONAZOLE 200 MG: 200 TABLET ORAL at 08:45

## 2021-08-10 RX ADMIN — LEVOTHYROXINE SODIUM 200 MCG: 0.05 TABLET ORAL at 08:45

## 2021-08-10 RX ADMIN — RIVAROXABAN 20 MG: 20 TABLET, FILM COATED ORAL at 18:48

## 2021-08-10 RX ADMIN — LEVOFLOXACIN 250 MG: 250 TABLET, FILM COATED ORAL at 08:45

## 2021-08-10 RX ADMIN — URSODIOL 300 MG: 300 CAPSULE ORAL at 19:51

## 2021-08-10 RX ADMIN — ONDANSETRON HYDROCHLORIDE 8 MG: 8 TABLET, FILM COATED ORAL at 00:18

## 2021-08-10 RX ADMIN — ACYCLOVIR 800 MG: 800 TABLET ORAL at 23:11

## 2021-08-10 RX ADMIN — Medication 5 MG: at 20:06

## 2021-08-10 RX ADMIN — AMLODIPINE BESYLATE 5 MG: 5 TABLET ORAL at 08:45

## 2021-08-10 RX ADMIN — SODIUM CHLORIDE: 9 INJECTION, SOLUTION INTRAVENOUS at 06:49

## 2021-08-10 RX ADMIN — ACYCLOVIR 800 MG: 800 TABLET ORAL at 10:54

## 2021-08-10 RX ADMIN — URSODIOL 300 MG: 300 CAPSULE ORAL at 08:44

## 2021-08-10 RX ADMIN — DIPHENHYDRAMINE HYDROCHLORIDE 25 MG: 25 CAPSULE ORAL at 10:54

## 2021-08-10 RX ADMIN — MYCOPHENOLATE MOFETIL 1500 MG: 500 INJECTION, POWDER, LYOPHILIZED, FOR SOLUTION INTRAVENOUS at 19:51

## 2021-08-10 RX ADMIN — ACYCLOVIR 800 MG: 800 TABLET ORAL at 14:52

## 2021-08-10 RX ADMIN — ACYCLOVIR 800 MG: 800 TABLET ORAL at 18:47

## 2021-08-10 ASSESSMENT — MIFFLIN-ST. JEOR: SCORE: 1910.08

## 2021-08-10 ASSESSMENT — ACTIVITIES OF DAILY LIVING (ADL)
ADLS_ACUITY_SCORE: 14

## 2021-08-10 NOTE — PROGRESS NOTES
s-pt up in room-states he drinks a lot since he has only one kidney and wants to keep it working good. No bm -senna given this carmenza. Ate [employer] [place] items. Son visiting. Looking forward to tx. Family and friends to be on facetime - [place] ceremony anticipated before . Calm and pleasant. Pt Reports frequency of urination. 75-150cc every 30min on average while awake. No straining and no burning reported at this time.   b-day -1  A-independent care at this time  r-intermitant assessments on going. Presently stable status.

## 2021-08-10 NOTE — PLAN OF CARE
Afebrile.  Pt has some slight HTN.  Continue to monitor.  PA aware.  OVSS.  Some abdominal discomfort.  Has been constipated but did have a stool early this am and another post  Transplant.  Up walking.  Eating and drinking.  Transplant flush until 2100.  Urine has been darker pink color since transplant but is getting clearer.  Showered.  Covid sent and negative.  Tac gtt at 6ml/hr and needs lvl tomorrow.  Continue to monitor. Continue plan of care.      Problem: Adult Inpatient Plan of Care  Goal: Plan of Care Review  Outcome: No Change  Flowsheets (Taken 8/10/2021 1412)  Plan of Care Reviewed With:    patient    spouse

## 2021-08-10 NOTE — PROGRESS NOTES
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 Pt shared that he is feeling well following transplant and denies complaints. Pt and spouse sending humorous video messages to his sister who is his donor and celebrating his new birthday. Pt shared that walking the halls has been the most helpful coping mechanism during his hospital stay. His wife processed current stresses at work and balancing wanting to be present in the hospital w/ patient vs working. Pt is encouraging his wife to continue to work as able and reassuring her that he is doing well emotionally and physically. CSW provided empathic listening, validation of concerns, and encouragement. CSW encouraged Pt to contact CSW for support, questions and/or resources.     Assessment: Pt presented as pleasant and engaged.  Pt appears to be coping appropriately at this time. Pt continues to be supported by his wife.     Plan: CSW will continue to provide supportive counseling and assistance with resources as needed. CSW will continue to collaborate with multidisciplinary team regarding Pt's plan of care.     MATEO Garcia, [facility]  Adult Blood & Marrow Transplant   Phone: [phone]  Pager: [phone]

## 2021-08-10 NOTE — PROGRESS NOTES
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 Walking as much as he can in room.  Does note some b/l LE discomfort.  Had a couple small BMs, nothing substantial at this point, but does not have pain or discomfort. Good PO intake. No bleeding.  Afebrile.  Looking forward to transplant today.     Review of Systems    Review of systems was negative other than noted above.     PHYSICAL EXAM      Weight     Wt Readings from Last 3 Encounters:   08/10/21 107.3 kg (236 lb 9.6 oz)   07/19/21 108 kg (238 lb)   07/13/21 108 kg (238 lb)          BP (!) 145/74 (BP Location: Left arm)   Pulse 97   Temp 97.8  F (36.6  C) (Oral)   Resp 16   Ht 1.835 m (6' 0.24\")   Wt 107.3 kg (236 lb 9.6 oz)   SpO2 98%   BMI 31.87 kg/m       KPS: 90  General: NAD   Eyes: YARELIS, sclera anicteric   Nose/Mouth/Throat: OP clear, buccal mucosa moist, no ulcerations   Lungs: CTA bilaterally  Cardiovascular: RRR, no M/R/G    Abdominal/Rectal: Hypoactive BS, abd soft, NT, ND  Ext: no LE edema. No calf pain or tenderness.  No gross deformity or reproducible pain.  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  Here for flu/cy/TBI and sib allo sct  HCT-CI score: 3 (prior solid tumor)  - allopurinol through day 0  - GCSF starts on day +5 until ANC >2500 x 2    2.  HEME:  - Pt/donor both Opos  - Transfuse for hgb <7g/dL; plt < 10k  - prolonged INR, likely xarelto SE  - Recent pulmonary emboli: He developed a pulmonary emboli in the setting of receiving PEG asparaginase.  On xarelto. Will continue until plt count < 50k.     3.  History of kidney cancer: He has a history of renal cancer and has a prior resection.  For that reason he has a single unilateral kidney.     4.  Dental clearance: Ok to proceed    5.  GVHD: Tac/MMF to to start on day -3; tac level on day -1 (8/9)    6.  ID: prophy acv/letermovir, fluconazole  - levaquin begin day -1 and continue until engraftment and off G-CSF  - sulfa allergic; pentamidine at day +28    9. Hx of graves disease; On synthroid    10.  FEN/renal:  - cytoxan induced hyponatremia; resolved    10. GI: Constipation. continue scheduled senna BID, miralax PRN.     11. CV: Hypertension. Likely secondary to tacrolimus. Started norvasc 5mg daily. Monitor.       Lashanda Figueroa pa-c  [phone]

## 2021-08-10 NOTE — PROGRESS NOTES
Type of transplant: Donor: Allogeneic - Related  Product:      BMT INFUSION DOCUMENTATION (last 48 hours)      BMT/Cellular Product Infusion     Row Name 08/10/21 0800                [REMOVED] Product 08/10/21 0925 HPC, Apheresis    Product Details Product Release Date: 08/10/21  -LL Product Release Time: 0925 -LL Product Type: HPC, Apheresis  -LL DIN: [id number]  -LL Product Description Code: [id number]  -LL Volume Dispensed (mL): 138 mL  -LL Completion Date (RN to complete): 08/10/21  -AK Completion Time (RN to complete): 1140  -AK    Checked by (Patient RN)  Claudia Cui RN  -AK       Checked by (Witness)  Brenna Zepeda RN  -LR       Product Volume Infused (mL)  138 mL  -AK       Flush Volume (mL)  60 mL  -AK       Volume Dispensed (mL)  --          [REMOVED] Product 08/10/21 0925 HPC, Apheresis    Product Details Product Release Date: 08/10/21  -LL Product Release Time: 0925 -LL Product Type: HPC, Apheresis  -LL DIN: [id number]  -LL Product Description Code: [id number]  -LL Volume Dispensed (mL): 104 mL  -LL Completion Date (RN to complete): 08/10/21  -AK Completion Time (RN to complete): 1156  -AK    Checked by (Patient RN)  Claudia Cui RN  -AK       Checked by (Witness)  Brenna Zepeda RN  -LR       Product Volume Infused (mL)  104 mL  -AK       Flush Volume (mL)  40 mL  -AK       Volume Dispensed (mL)  --          [REMOVED] Product 08/10/21 0925 HPC, Apheresis    Product Details Product Release Date: 08/10/21  -LL Product Release Time: 0925 -LL Product Type: HPC, Apheresis  -LL DIN: [id number]  -LL Product Description Code: [id number]  -LL Volume Dispensed (mL): 104 mL  -KZ Completion Date (RN to complete): 08/10/21  -AK Completion Time (RN to complete): 1212  -AK    Checked by (Patient RN)  Claudia Cui RN  -AK       Checked by (Witness)  Brenna Zepeda RNN  -LR       Product Volume Infused (mL)  104 mL  -AK       Flush Volume (mL)  40 mL  -AK       Volume Dispensed (mL)  --          [REMOVED] Product 08/10/21 0951  HPC, Apheresis    Product Details Product Release Date: 08/10/21  -LL Product Release Time: 0925 -LL Product Type: HPC, Apheresis  -LL DIN: [id number]  -LL Product Description Code: [id number]  -LL Volume Dispensed (mL): 144 mL  -LL Completion Date (RN to complete): 08/10/21  -AK Completion Time (RN to complete): 1230  -AK    Checked by (Patient RN)  Claudia Cui RN  -AK       Checked by (Witness)  Brenna Zepeda RN  -LR       Product Volume Infused (mL)  144 mL  -AK       Flush Volume (mL)  30 mL  -AK       Volume Dispensed (mL)  --          [REMOVED] Product 08/10/21 0925 HPC, Apheresis    Product Details Product Release Date: 08/10/21  -LL Product Release Time: 0925 -LL Product Type: HPC, Apheresis  -LL DIN: [id number]  -LL Product Description Code: [id number]  -LL Volume Dispensed (mL): 138 mL  -KZ Completion Date (RN to complete): 08/10/21  -AK Completion Time (RN to complete): 1250  -AK    Checked by (Patient RN)  Claudia Cui RN  -AK       Checked by (Witness)  Brenna Zepeda RN  -SANYA       Product Volume Infused (mL)  138 mL  -AK       Flush Volume (mL)  90 mL  -AK       Volume Dispensed (mL)  --         User Key  (r) = Recorded By, (t) = Taken By, (c) = Cosigned By    Initials Name Effective Dates    Claudia Lyman RN 04/29/14 -     Bina Kay 07/11/21 -     Vanessa Toure 07/11/21 -     Brenna Hendricks RN 04/29/14 -         Preparation: RN Documentation  Patient was premedicated as ordered: yes  Line Type: central line, left  Patient Stable Prior to Infusion: yes  Time Infusion Started: 1125  Teaching: side effects/monitoring  Tolerated/Reaction: Patient tolerance of product infusion  Immediate suspected transfusion reaction to the product: none  Did patient have prior history of similar signs/symptoms during this hospitalization?: NA  Symptoms during/after infusion:  (NA)  Did the patient tolerate the infusion well: yes  Medications and treatment for symptoms: NA  Did the symptoms resolve?:   (NA)  Enter comments if clots, leaks, broken bag, infusion delays, other issues with bag/infusion: NA  Flush until: 2100  Plan: Continue to monitor.  Continue plan of care.

## 2021-08-10 NOTE — PROGRESS NOTES
CLINICAL NUTRITION SERVICES - ASSESSMENT NOTE     Nutrition Prescription    RECOMMENDATIONS FOR MDs/PROVIDERS TO ORDER:  Pt eating well at this time    Malnutrition Status:    Patient does not meet two of the established criteria necessary for diagnosing malnutrition but is at risk for malnutrition    Recommendations already ordered by Registered Dietitian (RD):  Supplements PRN    Future/Additional Recommendations:  If TPN becomes POC in post-transplant course, rec via central line:  --Use dosing weight 87 kg (adjusted)  --Begin TPN, goal volume minimal per pharmD (~1200 ml/day) with initial 185g Dex daily (629kcal, GIR1.5), 120g AA daily (480 kcal), and 250 ml 20% IV lipids daily.  Micro/Rx: infuvite+MTE4  --ONLY once pt receives ~100% of initial continuous PN volume with K+/Mg++/Phos WNL, advance PN dex by 95 g every 1-2 days (pending lytes/Glu and Pharm D/RD discretion) to initial goal of 375g Dex (1275 kcal) to increase provisions to 2255 kcals (25 kcal/kg/day), 1.4 g PRO/kg/day, GIR 3.0 with 22% kcals from Fat.  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  Eating a mix of food from in house and food brought in -- has enjoyed some pizza luce and pieorgi's.  Drinking ensure enlive 1-2 a day as he does at home but does prefer richey/[employer] brand of supplements and is going to have his wife bring these in. Discussed importance of nutrition in post-transplant course and discussed importance of adequate protein intake. All questions/concerns addressed.     Pt seen during work up week on 7/13 OP:  -'' Pt has some decreased intakes  "from dysgeusia but otherwise overall has been eating well with pretty good appetite. Eats meals BID and gravitates to a more meat and potatoes type diet.  Drinks premier protein - 2 per day. No OTC micronutrient/herbal supplements.  ''    CURRENT NUTRITION ORDERS  Diet: High Kcal/High Protein, supplements PRN  Intake/Tolerance: Majority of po intakes per flowsheets 100%    LABS  Labs reviewed  Lytes WNL  Albumin 3.3L likely r/t illness/inflammation  Bili/LFTs WNL  Euglycemia    MEDICATIONS  Medications reviewed  zofran  Senna  Ursodiol  tacrolimus    ANTHROPOMETRICS  Height: 183.5 cm (6' .244\")  Most Recent Weight: 106 kg (233 lb 11.2 oz)    IBW: 80.9 kg  BMI: 31.5 -- Obesity Grade I BMI 30-34.9  Weight History: 4.6% wt loss over past ~6 months, not significant or severe  Admit wt 107.6 kg (8/3)  Wt Readings from Last 15 Encounters:   08/09/21 106 kg (233 lb 11.2 oz)   07/19/21 108 kg (238 lb)   07/13/21 108 kg (238 lb)   07/12/21 108 kg (238 lb)   07/08/21 107 kg (236 lb)   02/04/21 111.1 kg (245 lb)     Dosing Weight: 87 kg (adjusted, 106 kg on 8/9 and IBW of 80.9 kg)    ASSESSED NUTRITION NEEDS  Estimated Energy Needs: 8888-2362 kcals/day (25 - 30 kcals/kg)  Justification: Maintenance  Estimated Protein Needs: 115-130 grams protein/day (1.3 - 1.5 grams of pro/kg)  Justification: Increased needs -- monitor function with solitary kidney  Estimated Fluid Needs:  Per provider pending fluid status    PHYSICAL FINDINGS  See malnutrition section below.  Limited physical exam as pt wearing street clothes - heavy sweatshirt, flannel pants    MALNUTRITION  % Intake: No decreased intake noted  % Weight Loss: Weight loss does not meet criteria  Subcutaneous Fat Loss: None observed  Muscle Loss: None observed  Fluid Accumulation/Edema: Does not meet criteria  Malnutrition Diagnosis: Patient does not meet two of the established criteria necessary for diagnosing malnutrition but is at risk for malnutrition    NUTRITION " DIAGNOSIS  Predicted inadequate nutrient intake (calories, protein) related to potential to develop decreased po in post-transplant course and/or menu fatigue with LOS      INTERVENTIONS  Implementation  Nutrition Education: Provided education on RD role and nutrition POC. Additional per above   Collaboration with other providers -5C rounds  Medical food supplement therapy - PRN  Parenteral Nutrition/IV Fluids - recs above if becomes POC     Goals  Patient to consume % of nutritionally adequate meal trays TID, or the equivalent with supplements/snacks.     Monitoring/Evaluation  Progress toward goals will be monitored and evaluated per protocol.    Raquel Stewart MS, RD, , CNSC, LD.  5C/BMT Pager:[phone]

## 2021-08-10 NOTE — PROGRESS NOTES
BMT/Cellular Allogeneic Product Infusion       Patient Vitals for the past 24 hrs:   Temp Temp src Pulse Resp BP   08/09/21 1539 97.6  F (36.4  C) Oral 68 16 (!) 148/76   08/09/21 1950 97.7  F (36.5  C) Oral 72 16 (!) 141/77   08/10/21 0019 97.5  F (36.4  C) Oral 69 16 134/73   08/10/21 0423 97.6  F (36.4  C) Oral 75 16 (!) 151/81   08/10/21 0831 97.8  F (36.6  C) Oral 97 16 (!) 145/74   08/10/21 1117 97.7  F (36.5  C) -- 78 16 (!) 158/83   08/10/21 1138 97.7  F (36.5  C) -- 64 16 (!) 166/88   08/10/21 1156 97.7  F (36.5  C) -- 74 16 (!) 164/87   08/10/21 1212 97.8  F (36.6  C) -- 67 18 (!) 166/88   08/10/21 1234 97.7  F (36.5  C) -- 59 16 (!) 164/83         BMT INFUSION DOCUMENTATION (last 48 hours)      BMT/Cellular Product Infusion     Row Name 08/10/21 0800                [REMOVED] Product 08/10/21 0925 HPC, Apheresis    Product Details Product Release Date: 08/10/21  -LL Product Release Time: 0925 -LL Product Type: HPC, Apheresis  -LL DIN: [id number]  -LL Product Description Code: [id number]  -LL Volume Dispensed (mL): 138 mL  -LL Completion Date (RN to complete): 08/10/21  -AK Completion Time (RN to complete): 1140  -AK    Checked by (Patient RN)  Claudia Cui RN  -AK       Checked by (Witness)  Brenna Zepeda RN  -LR       Product Volume Infused (mL)  138 mL  -AK       Flush Volume (mL)  60 mL  -AK       Volume Dispensed (mL)  --          [REMOVED] Product 08/10/21 0925 HPC, Apheresis    Product Details Product Release Date: 08/10/21  -LL Product Release Time: 0925 -LL Product Type: HPC, Apheresis  -LL DIN: [id number]  -LL Product Description Code: [id number]  -LL Volume Dispensed (mL): 104 mL  -LL Completion Date (RN to complete): 08/10/21  -AK Completion Time (RN to complete): 1156  -AK    Checked by (Patient RN)  Claudia Cui RN  -AK       Checked by (Witness)  Brenna Zepeda RN  -LR       Product Volume Infused (mL)  104 mL  -AK       Flush Volume (mL)  40 mL  -AK       Volume Dispensed (mL)  --           [REMOVED] Product 08/10/21 0925 HPC, Apheresis    Product Details Product Release Date: 08/10/21  -LL Product Release Time: 0925 -LL Product Type: HPC, Apheresis  -LL DIN: [id number]  -LL Product Description Code: [id number]  -LL Volume Dispensed (mL): 104 mL  -KZ Completion Date (RN to complete): 08/10/21  -AK Completion Time (RN to complete): 1212  -AK    Checked by (Patient RN)  Claudia Cui RN  -AK       Checked by (Witness)  Brenna DO  -LR       Product Volume Infused (mL)  104 mL  -AK       Flush Volume (mL)  40 mL  -AK       Volume Dispensed (mL)  --          [REMOVED] Product 08/10/21 0925 HPC, Apheresis    Product Details Product Release Date: 08/10/21  -LL Product Release Time: 0925 -LL Product Type: HPC, Apheresis  -LL DIN: [id number]  -LL Product Description Code: [id number]  -LL Volume Dispensed (mL): 144 mL  -LL Completion Date (RN to complete): 08/10/21  -AK Completion Time (RN to complete): 1230  -AK    Checked by (Patient RN)  Claudia Cui RN  -AK       Checked by (Witness)  Brenna Zepeda RN  -LR       Product Volume Infused (mL)  144 mL  -AK       Flush Volume (mL)  30 mL  -AK       Volume Dispensed (mL)  --          [REMOVED] Product 08/10/21 0925 HPC, Apheresis    Product Details Product Release Date: 08/10/21  -LL Product Release Time: 0925 -LL Product Type: HPC, Apheresis  -LL DIN: [id number]  -LL Product Description Code: [id number]  -LL Volume Dispensed (mL): 138 mL  -KZ Completion Date (RN to complete): 08/10/21  -AK Completion Time (RN to complete): 1250  -AK    Checked by (Patient RN)  Claudia Cui RN  -AK       Checked by (Witness)  Brenna Zepeda RN  -LR       Product Volume Infused (mL)  138 mL  -AK       Flush Volume (mL)  90 mL  -AK       Volume Dispensed (mL)  --         User Key  (r) = Recorded By, (t) = Taken By, (c) = Cosigned By    Initials Name Effective Dates    Claudia Lyman RN 04/29/14 -     Bina Kay 07/11/21 -     LL Vanessa Ng 07/11/21 -     LR Katelyn, Brenna  A, RN 04/29/14 -         Allogeneic Donor Eligibility Determination and Summary of Records: Eligible        Type of Infusion: Allogeneic      Baseline Pre-Infusion Evaluation (to be completed by Provider):   Dyspnea: Grade 0 - none  Hypoxia: Grade 0 - not present  Fever: Grade 0 - afebrile  Chills: Grade 0 - none  Febrile Neutropenia: Grade 0 - not present  Sinus Bradycardia: Grade 0 - none  Hypertension: Grade 2 - stage 1 hypertension (systolic -159 mm Hg or diastolic BP 90-99 mm Hg); medical intervention indicated; recurrent or persistent (>/ 24 hours); symptomatic increase by >/ 20 mm Hg (diastolic) or to > 140/90 mm Hg if previously WNL; monotherapy indicated  Hypotension: Grade 0 - none  Chest Pain: Grade 0 - none  Bronchospasm: Grade 0 - none  Pain: Grade 0 - none  Rash: Grade 0 - None  Neurologic Specify: none    If adverse reactions, events or complications occur (fever greater than 2 degrees fahrenheit increase, and severe reactions of the following types: chills, dyspnea, bronchospasm, hyper/hypotension, hypoxia, bradycardia, chest pain, back/flank pain, hypoxia, and any other reaction deemed severe or life threatening; any instance of product bag breakage or unusual product appearance)    Any other events that are >= grade 3, then immediately contact the BMT Attending physician, the Cell Therapy Laboratory Medical Director (pager [phone]) and the Cell Therapy Laboratory ([phone]).  After midnight, holidays & weekends contact the [facility] on the appropriate campus ([facility] [place]: [phone]; [facility] [place]: [phone]).    Lashanda Figueroa PA-C

## 2021-08-10 NOTE — PROGRESS NOTES
SPIRITUAL HEALTH SERVICES  SPIRITUAL ASSESSMENT Progress Note  [facility] ([place]) 5C BMT     REFERRAL SOURCE: Follow up; BMT blessing    In person visit with Nik and spouse Lamar for Nik's requested BMT blessing. Several family members were also present via Facetime for the blessing service. Nik, Lamar, and other family members were very appreciative of this ceremony.     Following this, I spoke with both Lamar and Nik about their support network (family, friends, neighbors) and their lizeth in God's presence and activity in their lives, and throughout Nik's illnesses. Nik and Lamar also shared that their family is one that likes to share jokes with one another.  I affirmed the value of these multiple sources of support and the role of humor in coping with hospitalization.     PLAN: Spiritual Health Services remains available for patient, family, and staff support.     Please page the on call  either via [employer] (Spiritual Health/[facility]) or by placing a STAT or ASAP Epic referral for Spiritual Health (which will roll to the on call pager).        Bettye Sandoval  Chaplain Resident  Pager: [phone]

## 2021-08-10 NOTE — PLAN OF CARE
"Patient slept at intervals. Afebrile. Vitals stable. Sats ok on room air. Denied pain. Scheduled Zofran. No nausea overnight. Tacrolimus drip infusing at 120 mcg/hour. IV fluid at TKO until morning. Labs ok; no replacement necessary. Pre-transplant flush started at 0700. Patient awake in the morning; drinking fresh coffee. Asking about ability to walk in hallway as covid patient on unit. Patient has been constipated and receiving senna. He reported that he had bowel movement this morning; \"eight small balls\". Suggested he continue to take Senna. Still need weekly VRE specimen; patient aware.  Voiding good amounts of yellow urine.  "

## 2021-08-10 NOTE — PROGRESS NOTES
SPIRITUAL HEALTH SERVICES  SPIRITUAL ASSESSMENT Progress Note  [facility] ([place]) 5C BMT     REFERRAL SOURCE:  initiated follow up    Brief check-in with Lamar and Nik about transplant process thus far and delivered extra copies of blessing ceremony, per request.    PLAN: I will continue to follow. Spiritual Health Services remains available for patient, family, and staff support.     Please page the on call  either via [employer] Web (Spiritual Health/[facility]) or by placing a STAT or ASAP Epic referral for Spiritual Health (which will roll to the on call pager).        Bettye Sandoval  Chaplain Resident  Pager: [phone]

## 2021-08-11 LAB
ANION GAP SERPL CALCULATED.3IONS-SCNC: 6 MMOL/L (ref 3–14)
BASOPHILS # BLD AUTO: 0 10E3/UL (ref 0–0.2)
BASOPHILS NFR BLD AUTO: 0 %
BUN SERPL-MCNC: 33 MG/DL (ref 7–30)
CALCIUM SERPL-MCNC: 8.7 MG/DL (ref 8.5–10.1)
CHLORIDE BLD-SCNC: 110 MMOL/L (ref 94–109)
CMV DNA SPEC NAA+PROBE-ACNC: NOT DETECTED IU/ML
CO2 SERPL-SCNC: 24 MMOL/L (ref 20–32)
CREAT SERPL-MCNC: 1.08 MG/DL (ref 0.66–1.25)
EOSINOPHIL # BLD AUTO: 0 10E3/UL (ref 0–0.7)
EOSINOPHIL NFR BLD AUTO: 0 %
ERYTHROCYTE [DISTWIDTH] IN BLOOD BY AUTOMATED COUNT: 14.6 % (ref 10–15)
GFR SERPL CREATININE-BSD FRML MDRD: 73 ML/MIN/1.73M2
GLUCOSE BLD-MCNC: 118 MG/DL (ref 70–99)
HCT VFR BLD AUTO: 28.1 % (ref 40–53)
HGB BLD-MCNC: 9.3 G/DL (ref 13.3–17.7)
IMM GRANULOCYTES # BLD: 0 10E3/UL
IMM GRANULOCYTES NFR BLD: 1 %
LYMPHOCYTES # BLD AUTO: 0.1 10E3/UL (ref 0.8–5.3)
LYMPHOCYTES NFR BLD AUTO: 3 %
MAGNESIUM SERPL-MCNC: 2.1 MG/DL (ref 1.6–2.3)
MCH RBC QN AUTO: 32.5 PG (ref 26.5–33)
MCHC RBC AUTO-ENTMCNC: 33.1 G/DL (ref 31.5–36.5)
MCV RBC AUTO: 98 FL (ref 78–100)
MONOCYTES # BLD AUTO: 0.1 10E3/UL (ref 0–1.3)
MONOCYTES NFR BLD AUTO: 5 %
NEUTROPHILS # BLD AUTO: 2.4 10E3/UL (ref 1.6–8.3)
NEUTROPHILS NFR BLD AUTO: 91 %
NRBC # BLD AUTO: 0 10E3/UL
NRBC BLD AUTO-RTO: 0 /100
PHOSPHATE SERPL-MCNC: 3.4 MG/DL (ref 2.5–4.5)
PLATELET # BLD AUTO: 205 10E3/UL (ref 150–450)
POTASSIUM BLD-SCNC: 4.2 MMOL/L (ref 3.4–5.3)
RBC # BLD AUTO: 2.86 10E6/UL (ref 4.4–5.9)
SODIUM SERPL-SCNC: 140 MMOL/L (ref 133–144)
TACROLIMUS BLD-MCNC: 9.8 UG/L (ref 5–15)
TME LAST DOSE: NORMAL H
TME LAST DOSE: NORMAL H
WBC # BLD AUTO: 2.6 10E3/UL (ref 4–11)

## 2021-08-11 PROCEDURE — 258N000003 HC RX IP 258 OP 636: Performed by: PHYSICIAN ASSISTANT

## 2021-08-11 PROCEDURE — 250N000011 HC RX IP 250 OP 636: Performed by: PHYSICIAN ASSISTANT

## 2021-08-11 PROCEDURE — 250N000009 HC RX 250: Performed by: INTERNAL MEDICINE

## 2021-08-11 PROCEDURE — 258N000003 HC RX IP 258 OP 636: Performed by: INTERNAL MEDICINE

## 2021-08-11 PROCEDURE — 99233 SBSQ HOSP IP/OBS HIGH 50: CPT | Performed by: INTERNAL MEDICINE

## 2021-08-11 PROCEDURE — 250N000013 HC RX MED GY IP 250 OP 250 PS 637: Performed by: NURSE PRACTITIONER

## 2021-08-11 PROCEDURE — 250N000013 HC RX MED GY IP 250 OP 250 PS 637: Performed by: PHYSICIAN ASSISTANT

## 2021-08-11 PROCEDURE — 250N000013 HC RX MED GY IP 250 OP 250 PS 637: Performed by: INTERNAL MEDICINE

## 2021-08-11 PROCEDURE — 80197 ASSAY OF TACROLIMUS: CPT | Performed by: INTERNAL MEDICINE

## 2021-08-11 PROCEDURE — 84100 ASSAY OF PHOSPHORUS: CPT | Performed by: INTERNAL MEDICINE

## 2021-08-11 PROCEDURE — 85025 COMPLETE CBC W/AUTO DIFF WBC: CPT | Performed by: INTERNAL MEDICINE

## 2021-08-11 PROCEDURE — 80048 BASIC METABOLIC PNL TOTAL CA: CPT | Performed by: INTERNAL MEDICINE

## 2021-08-11 PROCEDURE — 999N000111 HC STATISTIC OT IP EVAL DEFER

## 2021-08-11 PROCEDURE — 83735 ASSAY OF MAGNESIUM: CPT | Performed by: INTERNAL MEDICINE

## 2021-08-11 PROCEDURE — 206N000001 HC R&B BMT UMMC

## 2021-08-11 RX ADMIN — RIVAROXABAN 20 MG: 20 TABLET, FILM COATED ORAL at 17:27

## 2021-08-11 RX ADMIN — TACROLIMUS 120 MCG/HR: 5 INJECTION, SOLUTION INTRAVENOUS at 19:44

## 2021-08-11 RX ADMIN — ACYCLOVIR 800 MG: 800 TABLET ORAL at 17:27

## 2021-08-11 RX ADMIN — ACYCLOVIR 800 MG: 800 TABLET ORAL at 09:02

## 2021-08-11 RX ADMIN — MYCOPHENOLATE MOFETIL 1500 MG: 500 INJECTION, POWDER, LYOPHILIZED, FOR SOLUTION INTRAVENOUS at 20:53

## 2021-08-11 RX ADMIN — ACYCLOVIR 800 MG: 800 TABLET ORAL at 11:53

## 2021-08-11 RX ADMIN — LORAZEPAM 1 MG: 0.5 TABLET ORAL at 21:04

## 2021-08-11 RX ADMIN — PANTOPRAZOLE SODIUM 40 MG: 40 TABLET, DELAYED RELEASE ORAL at 08:30

## 2021-08-11 RX ADMIN — AMLODIPINE BESYLATE 5 MG: 5 TABLET ORAL at 08:29

## 2021-08-11 RX ADMIN — Medication 5 MG: at 21:04

## 2021-08-11 RX ADMIN — URSODIOL 300 MG: 300 CAPSULE ORAL at 13:36

## 2021-08-11 RX ADMIN — URSODIOL 300 MG: 300 CAPSULE ORAL at 20:53

## 2021-08-11 RX ADMIN — ACYCLOVIR 800 MG: 800 TABLET ORAL at 13:36

## 2021-08-11 RX ADMIN — LEVOTHYROXINE SODIUM 200 MCG: 0.05 TABLET ORAL at 08:29

## 2021-08-11 RX ADMIN — MYCOPHENOLATE MOFETIL 1500 MG: 500 INJECTION, POWDER, LYOPHILIZED, FOR SOLUTION INTRAVENOUS at 08:33

## 2021-08-11 RX ADMIN — URSODIOL 300 MG: 300 CAPSULE ORAL at 08:30

## 2021-08-11 RX ADMIN — STANDARDIZED SENNA CONCENTRATE 2 TABLET: 8.6 TABLET ORAL at 20:53

## 2021-08-11 RX ADMIN — FLUCONAZOLE 200 MG: 200 TABLET ORAL at 08:29

## 2021-08-11 RX ADMIN — LEVOFLOXACIN 250 MG: 250 TABLET, FILM COATED ORAL at 11:52

## 2021-08-11 RX ADMIN — ACYCLOVIR 800 MG: 800 TABLET ORAL at 21:04

## 2021-08-11 ASSESSMENT — ACTIVITIES OF DAILY LIVING (ADL)
ADLS_ACUITY_SCORE: 14

## 2021-08-11 ASSESSMENT — MIFFLIN-ST. JEOR: SCORE: 1911.44

## 2021-08-11 NOTE — PLAN OF CARE
"1900-0730: /81 (BP Location: Left arm)   Pulse 72   Temp 97.6  F (36.4  C) (Oral)   Resp 16   Ht 1.835 m (6' 0.24\")   Wt 107.3 kg (236 lb 9.6 oz)   SpO2 98%   BMI 31.87 kg/m      Patient afebrile. Pt denies complaints. Ativan and melatonin given for sleep. Transplant flush finished last evening. Tacrolimus drip infusing. No AM replacements. Continue BMT plan of care.      Problem: Adult Inpatient Plan of Care  Goal: Plan of Care Review  Outcome: No Change     Problem: Adjustment to Transplant (Stem Cell/Bone Marrow Transplant)  Goal: Optimal Coping with Transplant  Outcome: No Change     "

## 2021-08-11 NOTE — PROGRESS NOTES
BMT Post Infusion Documentation    Data   Patient Vitals for the past 72 hrs:   Temp Temp src Pulse Resp BP   08/08/21 0933 -- -- -- -- (!) 150/87   08/08/21 1227 97.7  F (36.5  C) Oral 75 16 (!) 141/74   08/08/21 1551 97.7  F (36.5  C) Oral 73 16 (!) 156/86   08/08/21 1943 98  F (36.7  C) Oral 84 16 139/74   08/08/21 2327 97.6  F (36.4  C) Oral 71 16 (!) 142/76   08/09/21 0354 -- -- 67 16 (!) 145/80   08/09/21 0357 97.6  F (36.4  C) Oral 64 16 134/72   08/09/21 0835 97.7  F (36.5  C) Oral 79 16 127/80   08/09/21 1128 97.6  F (36.4  C) Oral 76 16 (!) 143/81   08/09/21 1539 97.6  F (36.4  C) Oral 68 16 (!) 148/76   08/09/21 1950 97.7  F (36.5  C) Oral 72 16 (!) 141/77   08/10/21 0019 97.5  F (36.4  C) Oral 69 16 134/73   08/10/21 0423 97.6  F (36.4  C) Oral 75 16 (!) 151/81   08/10/21 0831 97.8  F (36.6  C) Oral 97 16 (!) 145/74   08/10/21 1117 97.7  F (36.5  C) -- 78 16 (!) 158/83   08/10/21 1138 97.7  F (36.5  C) -- 64 16 (!) 166/88   08/10/21 1156 97.7  F (36.5  C) -- 74 16 (!) 164/87   08/10/21 1212 97.8  F (36.6  C) -- 67 18 (!) 166/88   08/10/21 1234 97.7  F (36.5  C) -- 59 16 (!) 164/83   08/10/21 1255 97.8  F (36.6  C) -- 67 18 (!) 179/111   08/10/21 1351 97.5  F (36.4  C) -- 66 18 (!) 163/84   08/10/21 1613 97.7  F (36.5  C) -- 67 16 (!) 172/89   08/10/21 1939 98  F (36.7  C) Oral 76 16 (!) 141/90   08/10/21 2311 97.7  F (36.5  C) Oral 73 16 125/70   08/11/21 0405 97.6  F (36.4  C) Oral 72 16 134/81   08/11/21 0805 97.6  F (36.4  C) Oral 80 16 (!) 158/95        BMT INFUSION DOCUMENTATION (last 24 hours)      BMT/Cellular Product Infusion     Row Name                  Cell Therapy Documentation    Product Release Date  --       Recipient Study ID  --       Donor  --       Donor MRN/ID  --       Donor ABO/Rh  --       Allogeneic Donor Eligibility Determination and Summary of Records  --       Type of Infusion  --       Total Volume Dispensed (mL)  --       Total NC Dose  --       Total CD34 Dose  --        Total CD3 dose  --       Total NC Dose Left in Storage  --       Comments for Product Issues  --       Donor ABO/Rh  --       Product Types  --       Product Numbers  --       Product Types and Numbers  --       Volume  --       ABO Mismatch  --       ZZTotal NC Dose  --       ZZTotal CD34 Dose  --       ZZTotal NC Dose Left in Storage  --          [REMOVED] Product 08/10/21 0925 HPC, Apheresis    Product Details Product Release Date: 08/10/21  -LL Product Release Time: 0925 -LL Product Type: HPC, Apheresis  -LL DIN: [id number]  -LL Product Description Code: [id number]  -LL Volume Dispensed (mL): 138 mL  -LL Completion Date (RN to complete): 08/10/21  -AK Completion Time (RN to complete): 1140  -AK    Checked by (Patient RN)  --       Checked by (Witness)  --       Product Volume Infused (mL)  --       Flush Volume (mL)  --       Volume Dispensed (mL)  --          [REMOVED] Product 08/10/21 0925 HPC, Apheresis    Product Details Product Release Date: 08/10/21  -LL Product Release Time: 0925 -LL Product Type: HPC, Apheresis  -LL DIN: [id number]  -LL Product Description Code: [id number]  -LL Volume Dispensed (mL): 104 mL  -LL Completion Date (RN to complete): 08/10/21  -AK Completion Time (RN to complete): 1156  -AK    Checked by (Patient RN)  --       Checked by (Witness)  --       Product Volume Infused (mL)  --       Flush Volume (mL)  --       Volume Dispensed (mL)  --          [REMOVED] Product 08/10/21 0925 HPC, Apheresis    Product Details Product Release Date: 08/10/21  -LL Product Release Time: 0925 -LL Product Type: HPC, Apheresis  -LL DIN: [id number]  -LL Product Description Code: [id number]  -LL Volume Dispensed (mL): 104 mL  -KZ Completion Date (RN to complete): 08/10/21  -AK Completion Time (RN to complete): 1212  -AK    Checked by (Patient RN)  --       Checked by (Witness)  --       Product Volume Infused (mL)  --       Flush Volume (mL)  --       Volume Dispensed (mL)  --           [REMOVED] Product 08/10/21 0925 HPC, Apheresis    Product Details Product Release Date: 08/10/21  -LL Product Release Time: 0925 -LL Product Type: HPC, Apheresis  -LL DIN: [id number]  -LL Product Description Code: [id number]  -LL Volume Dispensed (mL): 144 mL  -LL Completion Date (RN to complete): 08/10/21  -AK Completion Time (RN to complete): 1230  -AK    Checked by (Patient RN)  --       Checked by (Witness)  --       Product Volume Infused (mL)  --       Flush Volume (mL)  --       Volume Dispensed (mL)  --          [REMOVED] Product 08/10/21 0925 HPC, Apheresis    Product Details Product Release Date: 08/10/21  -LL Product Release Time: 0925 -LL Product Type: HPC, Apheresis  -LL DIN: [id number]  -LL Product Description Code: [id number]  -LL Volume Dispensed (mL): 138 mL  -KZ Completion Date (RN to complete): 08/10/21  -AK Completion Time (RN to complete): 1250  -AK    Checked by (Patient RN)  --       Checked by (Witness)  --       Product Volume Infused (mL)  --       Flush Volume (mL)  --       Volume Dispensed (mL)  --          RN Documentation    Patient was premedicated as ordered  --       Line Type  --       Patient Stable Prior to Infusion  --       Time Infusion Started  --       Checked by (Patient RN)  --       Checked by (RN 2)  --       Broken Bag?  --       Immediate suspected transfusion reaction to the product  --       Time Infusion Stopped  --       Total Flush Volume (mL)  --       Checked by (Witness)  --       Date Infusion Started  --       Date Infusion Stopped  --       Volume Infused (mL)  --       Total Volume Infused (cc)  --          Patient tolerance of product infusion    Immediate suspected transfusion reaction to the product  --       Did patient have prior history of similar signs/symptoms during this hospitalization?  --       Symptoms during/after infusion  --       Did the patient tolerate the infusion well  --       Medications and treatment for symptoms  --        Did the symptoms resolve?  --       Enter comments if clots, leaks, broken bag, infusion delays, other issues with bag/infusion  --       Describe symptoms  --         User Key  (r) = Recorded By, (t) = Taken By, (c) = Cosigned By    Initials Name Effective Dates    TAMMI MontanorClaudia RN 04/29/14 -     Bina Kay 07/11/21 -     Vanessa Toure 07/11/21 -             Post-Infusion Evaluation:   Infusion Related Reaction: Grade 0 - none  Dyspnea: Grade 0 - none  Hypoxia: Grade 0 - not present  Fever: Grade 0 - afebrile  Chills: Grade 0 - none  Febrile Neutropenia: Grade 0 - not present  Sinus Bradycardia: Grade 0 - none  Hypertension: Grade 3 - stage 2 hypertension (systolic BP >/ 160 mm Hg or diastolic BP >/ 100 mm Hg); medical intervention indicated; more than one drug or more intensive therapy than previously used indicated  Hypotension: Grade 0 - none  Chest Pain: Grade 0 - none  Bronchospasm: Grade 0 - none  Pain: Grade 0 - none  Rash: Grade 0 - None  Neurologic Specify: none    If this was a cord blood transplant, was more than one cord blood unit infused? N/A    Mayra Cosby PA-C

## 2021-08-11 NOTE — PROGRESS NOTES
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 No major medical complaints.     Review of Systems    Review of systems was negative other than noted above.     PHYSICAL EXAM      Weight     Wt Readings from Last 3 Encounters:   08/11/21 107.5 kg (236 lb 14.4 oz)   07/19/21 108 kg (238 lb)   07/13/21 108 kg (238 lb)          BP (!) 158/95 (BP Location: Left arm)   Pulse 80   Temp 97.6  F (36.4  C) (Oral)   Resp 16   Ht 1.835 m (6' 0.24\")   Wt 107.5 kg (236 lb 14.4 oz)   SpO2 94%   BMI 31.91 kg/m       KPS: 90  General: NAD   Eyes: YARELIS, sclera anicteric   Nose/Mouth/Throat: OP clear, buccal mucosa moist, no ulcerations   Lungs: CTA bilaterally  Cardiovascular: RRR, no M/R/G    Abdominal/Rectal: Hypoactive BS, abd soft, NT, ND  Ext: no LE edema. No calf pain or tenderness.  No gross deformity or reproducible pain.  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  Here for flu/cy/TBI and sib allo sct  HCT-CI score: 3 (prior solid tumor)  - GCSF starts on day +5 until ANC " >2500 x 2    2.  HEME:  - Pt/donor both Opos  - Transfuse for hgb <7g/dL; plt < 10k  - prolonged INR, likely xarelto SE  - Recent pulmonary emboli: He developed a pulmonary emboli in the setting of receiving PEG asparaginase.  On xarelto. Will continue until plt count < 50k.     3.  History of kidney cancer: He has a history of renal cancer and has a prior resection.  For that reason he has a single unilateral kidney.     4.  Dental clearance: Ok to proceed.    5.  GVHD: Tac/MMF   - tac level pending    6.  ID:   - prophy acv/letermovir, fluconazole, levaquin     - sulfa allergic; pentamidine at day +28    9. Hx of graves disease; On synthroid    10.  FEN/renal:  - cytoxan induced hyponatremia; resolved    10. GI: Constipation. continue scheduled senna BID, miralax PRN.     11. CV: Hypertension. Likely secondary to tacrolimus. Started norvasc 5mg daily. Monitor.       Mayra Cosby PA-C  [phone]

## 2021-08-11 NOTE — PLAN OF CARE
"Afebrile; VSS. Patient denied pain or nausea. Tacrolimus drip infusing. Continue plan of care.    /78 (BP Location: Left arm)   Pulse 92   Temp 98  F (36.7  C)   Resp 18   Ht 1.835 m (6' 0.24\")   Wt 107.5 kg (236 lb 14.4 oz)   SpO2 98%   BMI 31.91 kg/m       Problem: Adult Inpatient Plan of Care  Goal: Plan of Care Review  Outcome: No Change     "

## 2021-08-12 LAB
ALBUMIN SERPL-MCNC: 3.2 G/DL (ref 3.4–5)
ALP SERPL-CCNC: 55 U/L (ref 40–150)
ALT SERPL W P-5'-P-CCNC: 28 U/L (ref 0–70)
ANION GAP SERPL CALCULATED.3IONS-SCNC: 4 MMOL/L (ref 3–14)
AST SERPL W P-5'-P-CCNC: 24 U/L (ref 0–45)
BASOPHILS # BLD MANUAL: 0 10E3/UL (ref 0–0.2)
BASOPHILS NFR BLD MANUAL: 0 %
BILIRUB SERPL-MCNC: 0.7 MG/DL (ref 0.2–1.3)
BUN SERPL-MCNC: 28 MG/DL (ref 7–30)
CALCIUM SERPL-MCNC: 8.7 MG/DL (ref 8.5–10.1)
CHLORIDE BLD-SCNC: 110 MMOL/L (ref 94–109)
CO2 SERPL-SCNC: 26 MMOL/L (ref 20–32)
CREAT SERPL-MCNC: 1.08 MG/DL (ref 0.66–1.25)
EOSINOPHIL # BLD MANUAL: 0 10E3/UL (ref 0–0.7)
EOSINOPHIL NFR BLD MANUAL: 2 %
ERYTHROCYTE [DISTWIDTH] IN BLOOD BY AUTOMATED COUNT: 14.5 % (ref 10–15)
GFR SERPL CREATININE-BSD FRML MDRD: 73 ML/MIN/1.73M2
GLUCOSE BLD-MCNC: 93 MG/DL (ref 70–99)
HCT VFR BLD AUTO: 30 % (ref 40–53)
HGB BLD-MCNC: 9.8 G/DL (ref 13.3–17.7)
LYMPHOCYTES # BLD MANUAL: 0.3 10E3/UL (ref 0.8–5.3)
LYMPHOCYTES NFR BLD MANUAL: 35 %
MAGNESIUM SERPL-MCNC: 1.9 MG/DL (ref 1.6–2.3)
MCH RBC QN AUTO: 32.7 PG (ref 26.5–33)
MCHC RBC AUTO-ENTMCNC: 32.7 G/DL (ref 31.5–36.5)
MCV RBC AUTO: 100 FL (ref 78–100)
MONOCYTES # BLD MANUAL: 0 10E3/UL (ref 0–1.3)
MONOCYTES NFR BLD MANUAL: 4 %
NEUTROPHILS # BLD MANUAL: 0.5 10E3/UL (ref 1.6–8.3)
NEUTROPHILS NFR BLD MANUAL: 59 %
NRBC # BLD AUTO: 0 10E3/UL
NRBC BLD MANUAL-RTO: 1 %
PHOSPHATE SERPL-MCNC: 3.2 MG/DL (ref 2.5–4.5)
PLAT MORPH BLD: ABNORMAL
PLATELET # BLD AUTO: 184 10E3/UL (ref 150–450)
POTASSIUM BLD-SCNC: 4 MMOL/L (ref 3.4–5.3)
PROT SERPL-MCNC: 6.2 G/DL (ref 6.8–8.8)
RBC # BLD AUTO: 3 10E6/UL (ref 4.4–5.9)
RBC MORPH BLD: ABNORMAL
SODIUM SERPL-SCNC: 140 MMOL/L (ref 133–144)
TARGETS BLD QL SMEAR: ABNORMAL
WBC # BLD AUTO: 0.9 10E3/UL (ref 4–11)

## 2021-08-12 PROCEDURE — 250N000009 HC RX 250: Performed by: INTERNAL MEDICINE

## 2021-08-12 PROCEDURE — 85027 COMPLETE CBC AUTOMATED: CPT | Performed by: INTERNAL MEDICINE

## 2021-08-12 PROCEDURE — 99233 SBSQ HOSP IP/OBS HIGH 50: CPT | Performed by: INTERNAL MEDICINE

## 2021-08-12 PROCEDURE — 250N000013 HC RX MED GY IP 250 OP 250 PS 637: Performed by: NURSE PRACTITIONER

## 2021-08-12 PROCEDURE — 258N000003 HC RX IP 258 OP 636: Performed by: PHYSICIAN ASSISTANT

## 2021-08-12 PROCEDURE — 83735 ASSAY OF MAGNESIUM: CPT | Performed by: INTERNAL MEDICINE

## 2021-08-12 PROCEDURE — 258N000003 HC RX IP 258 OP 636: Performed by: INTERNAL MEDICINE

## 2021-08-12 PROCEDURE — 250N000013 HC RX MED GY IP 250 OP 250 PS 637: Performed by: INTERNAL MEDICINE

## 2021-08-12 PROCEDURE — 206N000001 HC R&B BMT UMMC

## 2021-08-12 PROCEDURE — 250N000013 HC RX MED GY IP 250 OP 250 PS 637: Performed by: PHYSICIAN ASSISTANT

## 2021-08-12 PROCEDURE — 80053 COMPREHEN METABOLIC PANEL: CPT | Performed by: INTERNAL MEDICINE

## 2021-08-12 PROCEDURE — 250N000011 HC RX IP 250 OP 636: Performed by: PHYSICIAN ASSISTANT

## 2021-08-12 PROCEDURE — 84100 ASSAY OF PHOSPHORUS: CPT | Performed by: INTERNAL MEDICINE

## 2021-08-12 RX ADMIN — LEVOTHYROXINE SODIUM 200 MCG: 0.05 TABLET ORAL at 09:10

## 2021-08-12 RX ADMIN — Medication 5 MG: at 21:19

## 2021-08-12 RX ADMIN — LORAZEPAM 1 MG: 0.5 TABLET ORAL at 21:19

## 2021-08-12 RX ADMIN — ACYCLOVIR 800 MG: 800 TABLET ORAL at 21:19

## 2021-08-12 RX ADMIN — RIVAROXABAN 20 MG: 20 TABLET, FILM COATED ORAL at 19:45

## 2021-08-12 RX ADMIN — URSODIOL 300 MG: 300 CAPSULE ORAL at 09:10

## 2021-08-12 RX ADMIN — PANTOPRAZOLE SODIUM 40 MG: 40 TABLET, DELAYED RELEASE ORAL at 09:10

## 2021-08-12 RX ADMIN — TACROLIMUS 120 MCG/HR: 5 INJECTION, SOLUTION INTRAVENOUS at 19:45

## 2021-08-12 RX ADMIN — MYCOPHENOLATE MOFETIL 1500 MG: 500 INJECTION, POWDER, LYOPHILIZED, FOR SOLUTION INTRAVENOUS at 09:11

## 2021-08-12 RX ADMIN — ACYCLOVIR 800 MG: 800 TABLET ORAL at 09:10

## 2021-08-12 RX ADMIN — AMLODIPINE BESYLATE 5 MG: 5 TABLET ORAL at 09:10

## 2021-08-12 RX ADMIN — LEVOFLOXACIN 250 MG: 250 TABLET, FILM COATED ORAL at 10:40

## 2021-08-12 RX ADMIN — URSODIOL 300 MG: 300 CAPSULE ORAL at 15:05

## 2021-08-12 RX ADMIN — FLUCONAZOLE 200 MG: 200 TABLET ORAL at 09:10

## 2021-08-12 RX ADMIN — MYCOPHENOLATE MOFETIL 1500 MG: 500 INJECTION, POWDER, LYOPHILIZED, FOR SOLUTION INTRAVENOUS at 19:45

## 2021-08-12 RX ADMIN — ACYCLOVIR 800 MG: 800 TABLET ORAL at 10:40

## 2021-08-12 RX ADMIN — ACYCLOVIR 800 MG: 800 TABLET ORAL at 15:04

## 2021-08-12 RX ADMIN — URSODIOL 300 MG: 300 CAPSULE ORAL at 19:45

## 2021-08-12 RX ADMIN — ACYCLOVIR 800 MG: 800 TABLET ORAL at 19:45

## 2021-08-12 ASSESSMENT — MIFFLIN-ST. JEOR: SCORE: 1900.56

## 2021-08-12 ASSESSMENT — ACTIVITIES OF DAILY LIVING (ADL)
ADLS_ACUITY_SCORE: 14

## 2021-08-12 NOTE — PROGRESS NOTES
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  He is very relieved that his bowels are finally moving.  He has no n/v/d, no rash, no cold/cough, no bleeding.  He is walking the halls and he is eating fine.     Review of Systems    Review of systems was negative other than noted above.     PHYSICAL EXAM      Weight     Wt Readings from Last 3 Encounters:   08/12/21 106.4 kg (234 lb 8 oz)   07/19/21 108 kg (238 lb)   07/13/21 108 kg (238 lb)          /75 (BP Location: Left arm)   Pulse 89   Temp 97.6  F (36.4  C) (Oral)   Resp 16   Ht 1.835 m (6' 0.24\")   Wt 106.4 kg (234 lb 8 oz)   SpO2 97%   BMI 31.59 kg/m       KPS: 90  General: NAD   Eyes: YARELIS, sclera anicteric   Nose/Mouth/Throat: OP clear, buccal mucosa moist, no ulcerations   Lungs: CTA bilaterally  Cardiovascular: RRR, no M/R/G    Abdominal/Rectal: Hypoactive BS, abd soft, NT, ND  Ext: 1+ edema bilateral ankles  Skin: No rashes or petechaie  Neuro: A&O, speech normal  Vascular Access: port in the right chest, left double lumen blake    Labs:  Lab Results   Component Value Date    WBC 0.9 (LL) 08/12/2021    ANEU 0.5 (L) 08/12/2021    HGB 9.8 (L) 08/12/2021    HCT 30.0 (L) 08/12/2021     08/12/2021     08/12/2021    POTASSIUM 4.0 08/12/2021    CHLORIDE 110 (H) 08/12/2021    CO2 26 08/12/2021    GLC 93 08/12/2021    BUN 28 08/12/2021    CR 1.08 08/12/2021    MAG 1.9 08/12/2021    INR 1.34 (H) 08/10/2021         OVERALL ASSESSMENT AND PLAN   Nik Nava is a 61 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  Here for flu/cy/TBI and sib allo sct  HCT-CI " score: 3 (prior solid tumor)  - GCSF starts on day +5 until ANC >2500 x 2    2.  HEME:  - Pt/donor both Opos  - Transfuse for hgb <7g/dL; plt < 10k  - prolonged INR, likely xarelto SE  - Recent pulmonary emboli: He developed a pulmonary emboli in the setting of receiving PEG asparaginase.  On xarelto. Will continue until plt count < 50k.     3.  History of kidney cancer: He has a history of renal cancer and has a prior resection.  For that reason he has a single unilateral kidney.     4.  Dental clearance: Ok to proceed.    5.  GVHD: Tac/MMF   - tac level 9.8; no changes.     6.  ID:   - prophy acv/letermovir, fluconazole, levaquin     - sulfa allergic; pentamidine at day +28    9. Hx of graves disease; On synthroid    10.  FEN/renal:  - cytoxan induced hyponatremia; resolved    10. GI: Constipation. Stop scheduled senna; miralax PRN.     11. CV: Hypertension. Likely secondary to tacrolimus. Started norvasc 5mg daily. Monitor.       Teressa Rick PA-C  8/12/2021

## 2021-08-12 NOTE — PLAN OF CARE
"1900-0730: /85 (BP Location: Left arm)   Pulse 75   Temp 97.8  F (36.6  C) (Oral)   Resp 18   Ht 1.835 m (6' 0.24\")   Wt 107.5 kg (236 lb 14.4 oz)   SpO2 97%   BMI 31.91 kg/m      Afebrile, VSS. Patient denies complaints. Up and active, walking halls this AM. Tacrolimus drip infusing. No AM lab replacements. Continue BMT plan of care.    Problem: Adult Inpatient Plan of Care  Goal: Plan of Care Review  Outcome: No Change     Problem: Adjustment to Transplant (Stem Cell/Bone Marrow Transplant)  Goal: Optimal Coping with Transplant  Outcome: No Change     "

## 2021-08-12 NOTE — PLAN OF CARE
"T-max 99.1; remainder of VSS. No nausea. Patient reported generalized abdominal cramping and 6 formed bowel movements today. Offered pain medication as well as non-pharmacological pain intervention and patient declined. Decreased appetite r/t abdominal discomfort. Otherwise drinking fluids well and ambulating throughout halls without issues. Tacro drip infusing. Continue plan of care.     /81   Pulse 85   Temp 99.1  F (37.3  C) (Oral)   Resp 18   Ht 1.835 m (6' 0.24\")   Wt 106.4 kg (234 lb 8 oz)   SpO2 97%   BMI 31.59 kg/m       Problem: Adult Inpatient Plan of Care  Goal: Plan of Care Review  Outcome: No Change     "

## 2021-08-13 LAB
ANION GAP SERPL CALCULATED.3IONS-SCNC: 5 MMOL/L (ref 3–14)
BASOPHILS # BLD MANUAL: 0 10E3/UL (ref 0–0.2)
BASOPHILS NFR BLD MANUAL: 0 %
BUN SERPL-MCNC: 24 MG/DL (ref 7–30)
CALCIUM SERPL-MCNC: 8.6 MG/DL (ref 8.5–10.1)
CHLORIDE BLD-SCNC: 107 MMOL/L (ref 94–109)
CO2 SERPL-SCNC: 26 MMOL/L (ref 20–32)
CREAT SERPL-MCNC: 0.99 MG/DL (ref 0.66–1.25)
EOSINOPHIL # BLD MANUAL: 0 10E3/UL (ref 0–0.7)
EOSINOPHIL NFR BLD MANUAL: 1 %
ERYTHROCYTE [DISTWIDTH] IN BLOOD BY AUTOMATED COUNT: 14.3 % (ref 10–15)
GFR SERPL CREATININE-BSD FRML MDRD: 81 ML/MIN/1.73M2
GLUCOSE BLD-MCNC: 92 MG/DL (ref 70–99)
HCT VFR BLD AUTO: 29.1 % (ref 40–53)
HGB BLD-MCNC: 9.6 G/DL (ref 13.3–17.7)
LACTATE SERPL-SCNC: 1.3 MMOL/L (ref 0.7–2)
LYMPHOCYTES # BLD MANUAL: 0.3 10E3/UL (ref 0.8–5.3)
LYMPHOCYTES NFR BLD MANUAL: 57 %
MAGNESIUM SERPL-MCNC: 1.7 MG/DL (ref 1.6–2.3)
MCH RBC QN AUTO: 32.8 PG (ref 26.5–33)
MCHC RBC AUTO-ENTMCNC: 33 G/DL (ref 31.5–36.5)
MCV RBC AUTO: 99 FL (ref 78–100)
MONOCYTES # BLD MANUAL: 0 10E3/UL (ref 0–1.3)
MONOCYTES NFR BLD MANUAL: 3 %
NEUTROPHILS # BLD MANUAL: 0.2 10E3/UL (ref 1.6–8.3)
NEUTROPHILS NFR BLD MANUAL: 39 %
PHOSPHATE SERPL-MCNC: 3 MG/DL (ref 2.5–4.5)
PLAT MORPH BLD: ABNORMAL
PLATELET # BLD AUTO: 178 10E3/UL (ref 150–450)
POTASSIUM BLD-SCNC: 4.1 MMOL/L (ref 3.4–5.3)
RBC # BLD AUTO: 2.93 10E6/UL (ref 4.4–5.9)
RBC MORPH BLD: ABNORMAL
SODIUM SERPL-SCNC: 138 MMOL/L (ref 133–144)
TACROLIMUS BLD-MCNC: 9.5 UG/L (ref 5–15)
TME LAST DOSE: NORMAL H
TME LAST DOSE: NORMAL H
WBC # BLD AUTO: 0.5 10E3/UL (ref 4–11)

## 2021-08-13 PROCEDURE — 80197 ASSAY OF TACROLIMUS: CPT | Performed by: INTERNAL MEDICINE

## 2021-08-13 PROCEDURE — 83605 ASSAY OF LACTIC ACID: CPT | Performed by: INTERNAL MEDICINE

## 2021-08-13 PROCEDURE — 250N000009 HC RX 250: Performed by: INTERNAL MEDICINE

## 2021-08-13 PROCEDURE — 250N000013 HC RX MED GY IP 250 OP 250 PS 637: Performed by: NURSE PRACTITIONER

## 2021-08-13 PROCEDURE — 84100 ASSAY OF PHOSPHORUS: CPT | Performed by: INTERNAL MEDICINE

## 2021-08-13 PROCEDURE — 83735 ASSAY OF MAGNESIUM: CPT | Performed by: INTERNAL MEDICINE

## 2021-08-13 PROCEDURE — 99233 SBSQ HOSP IP/OBS HIGH 50: CPT | Performed by: INTERNAL MEDICINE

## 2021-08-13 PROCEDURE — 85027 COMPLETE CBC AUTOMATED: CPT | Performed by: INTERNAL MEDICINE

## 2021-08-13 PROCEDURE — 250N000011 HC RX IP 250 OP 636: Performed by: PHYSICIAN ASSISTANT

## 2021-08-13 PROCEDURE — 250N000013 HC RX MED GY IP 250 OP 250 PS 637: Performed by: PHYSICIAN ASSISTANT

## 2021-08-13 PROCEDURE — 250N000013 HC RX MED GY IP 250 OP 250 PS 637: Performed by: INTERNAL MEDICINE

## 2021-08-13 PROCEDURE — 258N000003 HC RX IP 258 OP 636: Performed by: PHYSICIAN ASSISTANT

## 2021-08-13 PROCEDURE — 206N000001 HC R&B BMT UMMC

## 2021-08-13 PROCEDURE — 80048 BASIC METABOLIC PNL TOTAL CA: CPT | Performed by: INTERNAL MEDICINE

## 2021-08-13 PROCEDURE — 258N000003 HC RX IP 258 OP 636: Performed by: INTERNAL MEDICINE

## 2021-08-13 RX ORDER — LIDOCAINE HYDROCHLORIDE 20 MG/ML
JELLY TOPICAL EVERY 4 HOURS PRN
Status: DISCONTINUED | OUTPATIENT
Start: 2021-08-13 | End: 2021-08-18

## 2021-08-13 RX ADMIN — URSODIOL 300 MG: 300 CAPSULE ORAL at 20:24

## 2021-08-13 RX ADMIN — MYCOPHENOLATE MOFETIL 1500 MG: 500 INJECTION, POWDER, LYOPHILIZED, FOR SOLUTION INTRAVENOUS at 08:49

## 2021-08-13 RX ADMIN — RIVAROXABAN 20 MG: 20 TABLET, FILM COATED ORAL at 18:21

## 2021-08-13 RX ADMIN — LEVOFLOXACIN 250 MG: 250 TABLET, FILM COATED ORAL at 10:58

## 2021-08-13 RX ADMIN — AMLODIPINE BESYLATE 5 MG: 5 TABLET ORAL at 08:48

## 2021-08-13 RX ADMIN — LEVOTHYROXINE SODIUM 200 MCG: 0.05 TABLET ORAL at 08:47

## 2021-08-13 RX ADMIN — Medication 5 MG: at 21:29

## 2021-08-13 RX ADMIN — MYCOPHENOLATE MOFETIL 1500 MG: 500 INJECTION, POWDER, LYOPHILIZED, FOR SOLUTION INTRAVENOUS at 20:24

## 2021-08-13 RX ADMIN — FLUCONAZOLE 200 MG: 200 TABLET ORAL at 08:47

## 2021-08-13 RX ADMIN — URSODIOL 300 MG: 300 CAPSULE ORAL at 14:50

## 2021-08-13 RX ADMIN — ACYCLOVIR 800 MG: 800 TABLET ORAL at 21:29

## 2021-08-13 RX ADMIN — ACYCLOVIR 800 MG: 800 TABLET ORAL at 08:48

## 2021-08-13 RX ADMIN — ACYCLOVIR 800 MG: 800 TABLET ORAL at 14:50

## 2021-08-13 RX ADMIN — URSODIOL 300 MG: 300 CAPSULE ORAL at 08:47

## 2021-08-13 RX ADMIN — ACYCLOVIR 800 MG: 800 TABLET ORAL at 10:58

## 2021-08-13 RX ADMIN — PANTOPRAZOLE SODIUM 40 MG: 40 TABLET, DELAYED RELEASE ORAL at 08:48

## 2021-08-13 RX ADMIN — ACYCLOVIR 800 MG: 800 TABLET ORAL at 18:21

## 2021-08-13 RX ADMIN — TACROLIMUS 120 MCG/HR: 5 INJECTION, SOLUTION INTRAVENOUS at 20:20

## 2021-08-13 RX ADMIN — LORAZEPAM 1 MG: 0.5 TABLET ORAL at 21:29

## 2021-08-13 ASSESSMENT — ACTIVITIES OF DAILY LIVING (ADL)
ADLS_ACUITY_SCORE: 14

## 2021-08-13 ASSESSMENT — MIFFLIN-ST. JEOR: SCORE: 1891.48

## 2021-08-13 NOTE — PLAN OF CARE
"/77 (BP Location: Right arm)   Pulse 79   Temp 98  F (36.7  C) (Oral)   Resp 16   Ht 1.835 m (6' 0.24\")   Wt 105.5 kg (232 lb 8 oz)   SpO2 98%   BMI 31.32 kg/m    0570-1587: Day +3. Afebrile, HR intermittently tachy with movement. Triggered sepsis protocol, lactic 1.3. AOVSS. Walking in halls independently. Bilateral neuropathy, gabapentin cream ordered and coming from outside pharmacy Monday. Appetite good. Eating 100% of meals. No stool today, has 12 yesterday/ overnight, not loose but softer. Tac gtt infusing at 20 mcg, tac level wtr. No other new complaints or concerns.   Problem: Adult Inpatient Plan of Care  Goal: Absence of Hospital-Acquired Illness or Injury  Intervention: Identify and Manage Fall Risk  Recent Flowsheet Documentation  Taken 8/13/2021 1706 by Mayda Blackmon RN  Safety Promotion/Fall Prevention: safety round/check completed  Taken 8/13/2021 0800 by Mayda Blackmon RN  Safety Promotion/Fall Prevention: safety round/check completed     Problem: Adult Inpatient Plan of Care  Goal: Absence of Hospital-Acquired Illness or Injury  Intervention: Prevent Skin Injury  Recent Flowsheet Documentation  Taken 8/13/2021 1706 by Mayda Blackmon RN  Body Position: position changed independently  Taken 8/13/2021 0800 by Mayda Blackmon RN  Body Position: position changed independently     Problem: Adult Inpatient Plan of Care  Goal: Optimal Comfort and Wellbeing  Outcome: No Change     "

## 2021-08-13 NOTE — PROGRESS NOTES
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 He has a lot of abdominal cramping yesterday and has now had 12 bowel movements.  He had been constipated for 10 days.  Last BM was normal, formed.  No n/v, no rash.      Review of Systems    Review of systems was negative other than noted above.     PHYSICAL EXAM      Weight     Wt Readings from Last 3 Encounters:   08/12/21 106.4 kg (234 lb 8 oz)   07/19/21 108 kg (238 lb)   07/13/21 108 kg (238 lb)          BP (!) 130/90 (BP Location: Right arm)   Pulse 82   Temp 97.5  F (36.4  C) (Axillary)   Resp 16   Ht 1.835 m (6' 0.24\")   Wt 106.4 kg (234 lb 8 oz)   SpO2 97%   BMI 31.59 kg/m       KPS: 90  General: NAD   Eyes: YARELIS, sclera anicteric   Nose/Mouth/Throat: OP clear, buccal mucosa moist, no ulcerations   Lungs: CTA bilaterally  Cardiovascular: RRR, no M/R/G    Abdominal/Rectal: Hypoactive BS, abd soft, NT, ND  Ext: 1+ edema bilateral ankles  Skin: No rashes or petechaie  Neuro: A&O, speech normal  Vascular Access: port in the right chest, left double lumen blake    Labs:  Lab Results   Component Value Date    WBC 0.5 (LL) 08/13/2021    ANEU 0.2 (LL) 08/13/2021    HGB 9.6 (L) 08/13/2021    HCT 29.1 (L) 08/13/2021     08/13/2021     08/13/2021    POTASSIUM 4.1 08/13/2021    CHLORIDE 107 08/13/2021    CO2 26 08/13/2021    GLC 92 08/13/2021    BUN 24 08/13/2021    CR 0.99 08/13/2021    MAG 1.7 08/13/2021    INR 1.34 (H) 08/10/2021         OVERALL ASSESSMENT AND PLAN   Nik Nava is a 61 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  Here for flu/cy/TBI and sib " allo sct  HCT-CI score: 3 (prior solid tumor)  - GCSF starts on day +5 until ANC >2500 x 2    2.  HEME:  - Pt/donor both Opos  - Transfuse for hgb <7g/dL; plt < 10k  - prolonged INR, likely xarelto SE  - Recent pulmonary emboli: He developed a pulmonary emboli in the setting of receiving PEG asparaginase.  On xarelto. Will continue until plt count < 50k.     3.  History of kidney cancer: He has a history of renal cancer and has a prior resection.  For that reason he has a single unilateral kidney.     4.  Dental clearance: Ok to proceed.    5.  GVHD: Tac/MMF   - tac level 9.8; no changes.     6.  ID:   - prophy acv/letermovir, fluconazole, levaquin     - sulfa allergic; pentamidine at day +28    9. Hx of graves disease; On synthroid    10.  FEN/renal:  - cytoxan induced hyponatremia; resolved    10. GI: Constipation. Resolved; stopped scheduled senna; miralax PRN.     11. CV: Hypertension. Likely secondary to tacrolimus. Started norvasc 5mg daily. Monitor.       Teressa Rick PA-C  8/13/2021

## 2021-08-13 NOTE — PLAN OF CARE
"  Blood pressure (!) 130/90, pulse 82, temperature 97.5  F (36.4  C), temperature source Axillary, resp. rate 16, height 1.835 m (6' 0.24\"), weight 106.4 kg (234 lb 8 oz), SpO2 97 %.    Pt with borderline high blood pressure as recorded above. Pt denies Pain/N/V. Pt reported x 2 stools and he said the stools were formed. Pt is voiding good. Tac gtt is going at 120 mcg/ hour = 6 ML/hour. He will need Tac level this morning. Pt slept well after 1 mg of po Ativan. Lab results are WNL for pt and no replacement needed. Pt is awaiting count recovery. Continue with supportive care and follow plan of care.      Problem: Adult Inpatient Plan of Care  Goal: Plan of Care Review  Outcome: No Change     Problem: Adult Inpatient Plan of Care  Goal: Optimal Comfort and Wellbeing  Outcome: No Change     Problem: Adjustment to Transplant (Stem Cell/Bone Marrow Transplant)  Goal: Optimal Coping with Transplant  Outcome: No Change     Problem: Diarrhea (Stem Cell/Bone Marrow Transplant)  Goal: Diarrhea Symptom Control  Outcome: No Change     Problem: Fatigue (Stem Cell/Bone Marrow Transplant)  Goal: Energy Level Supports Daily Activity  Outcome: No Change       "

## 2021-08-14 LAB
ANION GAP SERPL CALCULATED.3IONS-SCNC: 5 MMOL/L (ref 3–14)
BASOPHILS # BLD MANUAL: 0 10E3/UL (ref 0–0.2)
BASOPHILS NFR BLD MANUAL: 0 %
BUN SERPL-MCNC: 22 MG/DL (ref 7–30)
CALCIUM SERPL-MCNC: 8.4 MG/DL (ref 8.5–10.1)
CHLORIDE BLD-SCNC: 112 MMOL/L (ref 94–109)
CO2 SERPL-SCNC: 24 MMOL/L (ref 20–32)
CREAT SERPL-MCNC: 0.94 MG/DL (ref 0.66–1.25)
EOSINOPHIL # BLD MANUAL: 0 10E3/UL (ref 0–0.7)
EOSINOPHIL NFR BLD MANUAL: 0 %
ERYTHROCYTE [DISTWIDTH] IN BLOOD BY AUTOMATED COUNT: 14.1 % (ref 10–15)
GFR SERPL CREATININE-BSD FRML MDRD: 86 ML/MIN/1.73M2
GLUCOSE BLD-MCNC: 95 MG/DL (ref 70–99)
HCT VFR BLD AUTO: 28.3 % (ref 40–53)
HGB BLD-MCNC: 9.5 G/DL (ref 13.3–17.7)
LYMPHOCYTES # BLD MANUAL: 0.2 10E3/UL (ref 0.8–5.3)
LYMPHOCYTES NFR BLD MANUAL: 47 %
MAGNESIUM SERPL-MCNC: 1.5 MG/DL (ref 1.6–2.3)
MCH RBC QN AUTO: 33.3 PG (ref 26.5–33)
MCHC RBC AUTO-ENTMCNC: 33.6 G/DL (ref 31.5–36.5)
MCV RBC AUTO: 99 FL (ref 78–100)
MONOCYTES # BLD MANUAL: 0 10E3/UL (ref 0–1.3)
MONOCYTES NFR BLD MANUAL: 6 %
NEUTROPHILS # BLD MANUAL: 0.2 10E3/UL (ref 1.6–8.3)
NEUTROPHILS NFR BLD MANUAL: 47 %
PHOSPHATE SERPL-MCNC: 3 MG/DL (ref 2.5–4.5)
PLAT MORPH BLD: ABNORMAL
PLATELET # BLD AUTO: 151 10E3/UL (ref 150–450)
POTASSIUM BLD-SCNC: 4.1 MMOL/L (ref 3.4–5.3)
RBC # BLD AUTO: 2.85 10E6/UL (ref 4.4–5.9)
RBC MORPH BLD: ABNORMAL
SODIUM SERPL-SCNC: 141 MMOL/L (ref 133–144)
WBC # BLD AUTO: 0.5 10E3/UL (ref 4–11)

## 2021-08-14 PROCEDURE — 250N000009 HC RX 250: Performed by: INTERNAL MEDICINE

## 2021-08-14 PROCEDURE — 258N000003 HC RX IP 258 OP 636: Performed by: INTERNAL MEDICINE

## 2021-08-14 PROCEDURE — 85027 COMPLETE CBC AUTOMATED: CPT | Performed by: INTERNAL MEDICINE

## 2021-08-14 PROCEDURE — 80048 BASIC METABOLIC PNL TOTAL CA: CPT | Performed by: INTERNAL MEDICINE

## 2021-08-14 PROCEDURE — 250N000013 HC RX MED GY IP 250 OP 250 PS 637: Performed by: INTERNAL MEDICINE

## 2021-08-14 PROCEDURE — 250N000011 HC RX IP 250 OP 636: Performed by: INTERNAL MEDICINE

## 2021-08-14 PROCEDURE — 258N000003 HC RX IP 258 OP 636: Performed by: PHYSICIAN ASSISTANT

## 2021-08-14 PROCEDURE — 250N000011 HC RX IP 250 OP 636: Performed by: PHYSICIAN ASSISTANT

## 2021-08-14 PROCEDURE — 250N000013 HC RX MED GY IP 250 OP 250 PS 637: Performed by: PHYSICIAN ASSISTANT

## 2021-08-14 PROCEDURE — 206N000001 HC R&B BMT UMMC

## 2021-08-14 PROCEDURE — 84100 ASSAY OF PHOSPHORUS: CPT | Performed by: INTERNAL MEDICINE

## 2021-08-14 PROCEDURE — 83735 ASSAY OF MAGNESIUM: CPT | Performed by: INTERNAL MEDICINE

## 2021-08-14 PROCEDURE — 250N000013 HC RX MED GY IP 250 OP 250 PS 637: Performed by: NURSE PRACTITIONER

## 2021-08-14 PROCEDURE — 99233 SBSQ HOSP IP/OBS HIGH 50: CPT | Performed by: INTERNAL MEDICINE

## 2021-08-14 RX ORDER — MAGNESIUM SULFATE HEPTAHYDRATE 40 MG/ML
4 INJECTION, SOLUTION INTRAVENOUS ONCE
Status: COMPLETED | OUTPATIENT
Start: 2021-08-14 | End: 2021-08-14

## 2021-08-14 RX ADMIN — ACYCLOVIR 800 MG: 800 TABLET ORAL at 10:54

## 2021-08-14 RX ADMIN — LEVOTHYROXINE SODIUM 200 MCG: 0.05 TABLET ORAL at 08:06

## 2021-08-14 RX ADMIN — Medication 5 MG: at 21:25

## 2021-08-14 RX ADMIN — MYCOPHENOLATE MOFETIL 1500 MG: 500 INJECTION, POWDER, LYOPHILIZED, FOR SOLUTION INTRAVENOUS at 08:06

## 2021-08-14 RX ADMIN — AMLODIPINE BESYLATE 5 MG: 5 TABLET ORAL at 08:05

## 2021-08-14 RX ADMIN — PANTOPRAZOLE SODIUM 40 MG: 40 TABLET, DELAYED RELEASE ORAL at 08:05

## 2021-08-14 RX ADMIN — ACYCLOVIR 800 MG: 800 TABLET ORAL at 21:25

## 2021-08-14 RX ADMIN — ACYCLOVIR 800 MG: 800 TABLET ORAL at 14:34

## 2021-08-14 RX ADMIN — LEVOFLOXACIN 250 MG: 250 TABLET, FILM COATED ORAL at 10:55

## 2021-08-14 RX ADMIN — LORAZEPAM 1 MG: 0.5 TABLET ORAL at 21:25

## 2021-08-14 RX ADMIN — MAGNESIUM SULFATE IN WATER 4 G: 40 INJECTION, SOLUTION INTRAVENOUS at 05:08

## 2021-08-14 RX ADMIN — URSODIOL 300 MG: 300 CAPSULE ORAL at 20:22

## 2021-08-14 RX ADMIN — URSODIOL 300 MG: 300 CAPSULE ORAL at 14:34

## 2021-08-14 RX ADMIN — URSODIOL 300 MG: 300 CAPSULE ORAL at 08:06

## 2021-08-14 RX ADMIN — ACYCLOVIR 800 MG: 800 TABLET ORAL at 17:07

## 2021-08-14 RX ADMIN — RIVAROXABAN 20 MG: 20 TABLET, FILM COATED ORAL at 17:07

## 2021-08-14 RX ADMIN — FLUCONAZOLE 200 MG: 200 TABLET ORAL at 08:05

## 2021-08-14 RX ADMIN — ACYCLOVIR 800 MG: 800 TABLET ORAL at 08:06

## 2021-08-14 RX ADMIN — MYCOPHENOLATE MOFETIL 1500 MG: 500 INJECTION, POWDER, LYOPHILIZED, FOR SOLUTION INTRAVENOUS at 20:22

## 2021-08-14 RX ADMIN — TACROLIMUS 120 MCG/HR: 5 INJECTION, SOLUTION INTRAVENOUS at 20:22

## 2021-08-14 ASSESSMENT — ACTIVITIES OF DAILY LIVING (ADL)
ADLS_ACUITY_SCORE: 14

## 2021-08-14 NOTE — PLAN OF CARE
"/64 (BP Location: Left arm)   Pulse 91   Temp 98.1  F (36.7  C) (Oral)   Resp 18   Ht 1.835 m (6' 0.24\")   Wt 105.5 kg (232 lb 8 oz)   SpO2 97%   BMI 31.32 kg/m    4455-3823: VSS. Still having some aches/ \"pins and needles\" in feet. PRN lidocaine cream applied to bottoms of feet bilaterally with minimal relief. Is resting between walks in hallway, and elevating legs. Also having hemorrhoid pain and burning sensation. Supplied with dimethicone spray, soft wipes, witch hazel, and barrier cream. Educated on use of these. One BM this AM, patient states it was a mixture of loose/ formed stool. Appetite great, ate soup, protein shake, stephanie cheese steak, and will try toast for dinner.   Problem: Adult Inpatient Plan of Care  Goal: Absence of Hospital-Acquired Illness or Injury  Intervention: Identify and Manage Fall Risk  Recent Flowsheet Documentation  Taken 8/14/2021 0800 by Mayda Blackmon, RN  Safety Promotion/Fall Prevention:   room organization consistent   treat reversible contributory factors     Problem: Adult Inpatient Plan of Care  Goal: Absence of Hospital-Acquired Illness or Injury  Intervention: Prevent Skin Injury  Recent Flowsheet Documentation  Taken 8/14/2021 0800 by Mayda Blackmon, RN  Body Position: position changed independently     Problem: Adult Inpatient Plan of Care  Goal: Patient-Specific Goal (Individualized)  8/14/2021 1857 by Mayda Blackmon, RN  Outcome: No Change     "

## 2021-08-14 NOTE — PLAN OF CARE
"  Blood pressure 133/82, pulse 93, temperature 97.6  F (36.4  C), temperature source Axillary, resp. rate 16, height 1.835 m (6' 0.24\"), weight 105.5 kg (232 lb 8 oz), SpO2 96 %.    Pt is AVSS as recorded above. A/O and he got Ativan 1 mg for sleep and he slept well. Pt was up walking the jerry. He is voiding good. He c/o neuropathy pain in his feet this morning. He is still waiting for Gabapentin cream to came from outside pharmacy. Tac gtt is at 120 mcg/hour =6 ML/hour. Low Magnesium level  1.5 and he is getting 4 g of Magnesium Sulfate over 2 hours. Awaiting count recovery. Continue to monitor pt and follow plan of care.      Problem: Adult Inpatient Plan of Care  Goal: Plan of Care Review  Outcome: No Change     Problem: Adult Inpatient Plan of Care  Goal: Patient-Specific Goal (Individualized)  Outcome: No Change     Problem: Adjustment to Transplant (Stem Cell/Bone Marrow Transplant)  Goal: Optimal Coping with Transplant  Outcome: No Change     Problem: Diarrhea (Stem Cell/Bone Marrow Transplant)  Goal: Diarrhea Symptom Control  Outcome: No Change     Problem: Fatigue (Stem Cell/Bone Marrow Transplant)  Goal: Energy Level Supports Daily Activity  Outcome: No Change     Problem: Nausea and Vomiting (Stem Cell/Bone Marrow Transplant)  Goal: Nausea and Vomiting Symptom Relief  Outcome: No Change     "

## 2021-08-14 NOTE — PROGRESS NOTES
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 Legs are achy this morning but still plans to walk. 3 stools yesterday, none since. Eating/drinking okay. No fevers.    Review of Systems    Review of systems was negative other than noted above.     PHYSICAL EXAM      Weight     Wt Readings from Last 3 Encounters:   08/13/21 105.5 kg (232 lb 8 oz)   07/19/21 108 kg (238 lb)   07/13/21 108 kg (238 lb)          BP (!) 142/81 (BP Location: Right arm)   Pulse 81   Temp 97.8  F (36.6  C) (Oral)   Resp 18   Ht 1.835 m (6' 0.24\")   Wt 105.5 kg (232 lb 8 oz)   SpO2 97%   BMI 31.32 kg/m       KPS: 90  General: NAD   Eyes: YARELIS, sclera anicteric   Nose/Mouth/Throat: OP clear, buccal mucosa moist, no ulcerations   Lungs: CTA bilaterally  Cardiovascular: RRR, no M/R/G    Abdominal/Rectal: Hypoactive BS, abd soft, NT, ND  Ext: 1+ edema bilateral ankles  Skin: No rashes or petechaie  Neuro: A&O, speech normal  Vascular Access: port in the right chest, left double lumen blake    Labs:  Lab Results   Component Value Date    WBC 0.5 (LL) 08/14/2021    ANEU 0.2 (LL) 08/14/2021    HGB 9.5 (L) 08/14/2021    HCT 28.3 (L) 08/14/2021     08/14/2021     08/14/2021    POTASSIUM 4.1 08/14/2021    CHLORIDE 112 (H) 08/14/2021    CO2 24 08/14/2021    GLC 95 08/14/2021    BUN 22 08/14/2021    CR 0.94 08/14/2021    MAG 1.5 (L) 08/14/2021    INR 1.34 (H) 08/10/2021         OVERALL ASSESSMENT AND PLAN   Nik Nava is a 61 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  Here for flu/cy/TBI and sib allo sct  HCT-CI score: 3 (prior solid tumor)  - " GCSF starts on day +5 until ANC >2500 x 2    2.  HEME:  - Pt/donor both Opos  - Transfuse for hgb <7g/dL; plt < 10k  - prolonged INR, likely xarelto SE  - Recent pulmonary emboli: He developed a pulmonary emboli in the setting of receiving PEG asparaginase.  On xarelto. Will continue until plt count < 50k.     3.  History of kidney cancer: He has a history of renal cancer and has a prior resection.  For that reason he has a single unilateral kidney.     4.  Dental clearance: Ok to proceed.    5.  GVHD: Tac/MMF   - tac level 9.5 8/13; no changes.     6.  ID:   - prophy acv/letermovir, fluconazole, levaquin     - sulfa allergic; pentamidine at day +28    9. Hx of graves disease; On synthroid    10.  FEN/renal:  - cytoxan induced hyponatremia; resolved    10. GI: Constipation. Resolved; stopped scheduled senna; miralax PRN.     11. CV: Hypertension. Likely secondary to tacrolimus. Started norvasc 5mg daily. Monitor.       Patito Mims NP

## 2021-08-15 LAB
ANION GAP SERPL CALCULATED.3IONS-SCNC: 5 MMOL/L (ref 3–14)
BASOPHILS # BLD MANUAL: 0 10E3/UL (ref 0–0.2)
BASOPHILS NFR BLD MANUAL: 0 %
BUN SERPL-MCNC: 22 MG/DL (ref 7–30)
CALCIUM SERPL-MCNC: 8.3 MG/DL (ref 8.5–10.1)
CHLORIDE BLD-SCNC: 110 MMOL/L (ref 94–109)
CO2 SERPL-SCNC: 24 MMOL/L (ref 20–32)
CREAT SERPL-MCNC: 0.96 MG/DL (ref 0.66–1.25)
EOSINOPHIL # BLD MANUAL: 0 10E3/UL (ref 0–0.7)
EOSINOPHIL NFR BLD MANUAL: 0 %
ERYTHROCYTE [DISTWIDTH] IN BLOOD BY AUTOMATED COUNT: 14.2 % (ref 10–15)
GFR SERPL CREATININE-BSD FRML MDRD: 84 ML/MIN/1.73M2
GLUCOSE BLD-MCNC: 98 MG/DL (ref 70–99)
HCT VFR BLD AUTO: 27.4 % (ref 40–53)
HGB BLD-MCNC: 9 G/DL (ref 13.3–17.7)
LYMPHOCYTES # BLD MANUAL: 0.2 10E3/UL (ref 0.8–5.3)
LYMPHOCYTES NFR BLD MANUAL: 43 %
MAGNESIUM SERPL-MCNC: 1.8 MG/DL (ref 1.6–2.3)
MCH RBC QN AUTO: 32.6 PG (ref 26.5–33)
MCHC RBC AUTO-ENTMCNC: 32.8 G/DL (ref 31.5–36.5)
MCV RBC AUTO: 99 FL (ref 78–100)
MONOCYTES # BLD MANUAL: 0.1 10E3/UL (ref 0–1.3)
MONOCYTES NFR BLD MANUAL: 11 %
NEUTROPHILS # BLD MANUAL: 0.2 10E3/UL (ref 1.6–8.3)
NEUTROPHILS NFR BLD MANUAL: 46 %
PHOSPHATE SERPL-MCNC: 3.1 MG/DL (ref 2.5–4.5)
PLAT MORPH BLD: ABNORMAL
PLATELET # BLD AUTO: 133 10E3/UL (ref 150–450)
POTASSIUM BLD-SCNC: 4.3 MMOL/L (ref 3.4–5.3)
RBC # BLD AUTO: 2.76 10E6/UL (ref 4.4–5.9)
RBC MORPH BLD: ABNORMAL
SODIUM SERPL-SCNC: 139 MMOL/L (ref 133–144)
WBC # BLD AUTO: 0.5 10E3/UL (ref 4–11)

## 2021-08-15 PROCEDURE — 250N000009 HC RX 250: Performed by: INTERNAL MEDICINE

## 2021-08-15 PROCEDURE — 258N000003 HC RX IP 258 OP 636: Performed by: INTERNAL MEDICINE

## 2021-08-15 PROCEDURE — 99233 SBSQ HOSP IP/OBS HIGH 50: CPT | Performed by: INTERNAL MEDICINE

## 2021-08-15 PROCEDURE — 85027 COMPLETE CBC AUTOMATED: CPT | Performed by: INTERNAL MEDICINE

## 2021-08-15 PROCEDURE — 250N000011 HC RX IP 250 OP 636: Performed by: INTERNAL MEDICINE

## 2021-08-15 PROCEDURE — 258N000003 HC RX IP 258 OP 636: Performed by: PHYSICIAN ASSISTANT

## 2021-08-15 PROCEDURE — 250N000013 HC RX MED GY IP 250 OP 250 PS 637: Performed by: INTERNAL MEDICINE

## 2021-08-15 PROCEDURE — 250N000013 HC RX MED GY IP 250 OP 250 PS 637: Performed by: PHYSICIAN ASSISTANT

## 2021-08-15 PROCEDURE — 83735 ASSAY OF MAGNESIUM: CPT | Performed by: INTERNAL MEDICINE

## 2021-08-15 PROCEDURE — 250N000013 HC RX MED GY IP 250 OP 250 PS 637: Performed by: NURSE PRACTITIONER

## 2021-08-15 PROCEDURE — 80048 BASIC METABOLIC PNL TOTAL CA: CPT | Performed by: INTERNAL MEDICINE

## 2021-08-15 PROCEDURE — 250N000011 HC RX IP 250 OP 636: Performed by: PHYSICIAN ASSISTANT

## 2021-08-15 PROCEDURE — 206N000001 HC R&B BMT UMMC

## 2021-08-15 PROCEDURE — 84100 ASSAY OF PHOSPHORUS: CPT | Performed by: INTERNAL MEDICINE

## 2021-08-15 RX ADMIN — AMLODIPINE BESYLATE 5 MG: 5 TABLET ORAL at 07:42

## 2021-08-15 RX ADMIN — ACYCLOVIR 800 MG: 800 TABLET ORAL at 17:43

## 2021-08-15 RX ADMIN — DEXTROSE MONOHYDRATE 20 ML: 50 INJECTION, SOLUTION INTRAVENOUS at 20:32

## 2021-08-15 RX ADMIN — URSODIOL 300 MG: 300 CAPSULE ORAL at 20:32

## 2021-08-15 RX ADMIN — TACROLIMUS 120 MCG/HR: 5 INJECTION, SOLUTION INTRAVENOUS at 20:34

## 2021-08-15 RX ADMIN — URSODIOL 300 MG: 300 CAPSULE ORAL at 07:42

## 2021-08-15 RX ADMIN — ACYCLOVIR 800 MG: 800 TABLET ORAL at 11:02

## 2021-08-15 RX ADMIN — MYCOPHENOLATE MOFETIL 1500 MG: 500 INJECTION, POWDER, LYOPHILIZED, FOR SOLUTION INTRAVENOUS at 07:43

## 2021-08-15 RX ADMIN — LORAZEPAM 1 MG: 0.5 TABLET ORAL at 21:52

## 2021-08-15 RX ADMIN — ACYCLOVIR 800 MG: 800 TABLET ORAL at 21:52

## 2021-08-15 RX ADMIN — DEXTROSE MONOHYDRATE 20 ML: 50 INJECTION, SOLUTION INTRAVENOUS at 20:34

## 2021-08-15 RX ADMIN — MYCOPHENOLATE MOFETIL 1500 MG: 500 INJECTION, POWDER, LYOPHILIZED, FOR SOLUTION INTRAVENOUS at 20:32

## 2021-08-15 RX ADMIN — URSODIOL 300 MG: 300 CAPSULE ORAL at 15:14

## 2021-08-15 RX ADMIN — FILGRASTIM 480 MCG: 480 INJECTION, SOLUTION INTRAVENOUS; SUBCUTANEOUS at 20:33

## 2021-08-15 RX ADMIN — ACYCLOVIR 800 MG: 800 TABLET ORAL at 15:14

## 2021-08-15 RX ADMIN — LEVOFLOXACIN 250 MG: 250 TABLET, FILM COATED ORAL at 11:02

## 2021-08-15 RX ADMIN — PANTOPRAZOLE SODIUM 40 MG: 40 TABLET, DELAYED RELEASE ORAL at 07:42

## 2021-08-15 RX ADMIN — LEVOTHYROXINE SODIUM 200 MCG: 0.05 TABLET ORAL at 07:42

## 2021-08-15 RX ADMIN — ACYCLOVIR 800 MG: 800 TABLET ORAL at 07:42

## 2021-08-15 RX ADMIN — Medication 5 MG: at 21:52

## 2021-08-15 RX ADMIN — FLUCONAZOLE 200 MG: 200 TABLET ORAL at 07:42

## 2021-08-15 RX ADMIN — RIVAROXABAN 20 MG: 20 TABLET, FILM COATED ORAL at 17:43

## 2021-08-15 ASSESSMENT — ACTIVITIES OF DAILY LIVING (ADL)
ADLS_ACUITY_SCORE: 14

## 2021-08-15 ASSESSMENT — MIFFLIN-ST. JEOR: SCORE: 1885.59

## 2021-08-15 NOTE — PLAN OF CARE
"/88 (BP Location: Left arm)   Pulse 93   Temp 98  F (36.7  C) (Oral)   Resp 18   Ht 1.835 m (6' 0.24\")   Wt 104.9 kg (231 lb 3.2 oz)   SpO2 93%   BMI 31.14 kg/m       [id number]: Afebrile. VSS. Up walking in hallway, patient states his \"feet feel much better\" after putting compression stockings on early this AM prior to getting out of bed. Eating 100% of meals. Had soft stool this afternoon. States his perirectal area is \"way better\" after using combination of mehdi spray, soft wipes, and tucks pads. No other new concerns or complaints. Walking in jeryr. Red line changed, both caps changed.      Problem: Adult Inpatient Plan of Care  Goal: Absence of Hospital-Acquired Illness or Injury  Outcome: No Change     Problem: Adult Inpatient Plan of Care  Goal: Absence of Hospital-Acquired Illness or Injury  Intervention: Identify and Manage Fall Risk  Recent Flowsheet Documentation  Taken 8/15/2021 0800 by Mayda Blackmon, RN  Safety Promotion/Fall Prevention: mobility aid in reach     Problem: Adult Inpatient Plan of Care  Goal: Absence of Hospital-Acquired Illness or Injury  Intervention: Prevent Skin Injury  Recent Flowsheet Documentation  Taken 8/15/2021 0800 by Mayda Blackmon, RN  Body Position: position changed independently     "

## 2021-08-15 NOTE — PROGRESS NOTES
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 Putting compression tights on first thing in the morning is helping his neuropathy symptoms. Up walking halls this morning.    Review of Systems    Review of systems was negative other than noted above.     PHYSICAL EXAM      Weight     Wt Readings from Last 3 Encounters:   08/15/21 104.9 kg (231 lb 3.2 oz)   07/19/21 108 kg (238 lb)   07/13/21 108 kg (238 lb)          BP (!) 140/82 (BP Location: Left arm)   Pulse 77   Temp 97.5  F (36.4  C) (Axillary)   Resp 18   Ht 1.835 m (6' 0.24\")   Wt 104.9 kg (231 lb 3.2 oz)   SpO2 97%   BMI 31.14 kg/m       KPS: 90  General: NAD, seen in hallway today   Eyes: sclera anicteric   Lungs: breathing comfortably on RA  Skin: No rashes or petechaie  Neuro: A&O, speech normal  Vascular Access: port in the right chest, left double lumen blake    Labs:  Lab Results   Component Value Date    WBC 0.5 (LL) 08/15/2021    ANEU 0.2 (LL) 08/15/2021    HGB 9.0 (L) 08/15/2021    HCT 27.4 (L) 08/15/2021     (L) 08/15/2021     08/15/2021    POTASSIUM 4.3 08/15/2021    CHLORIDE 110 (H) 08/15/2021    CO2 24 08/15/2021    GLC 98 08/15/2021    BUN 22 08/15/2021    CR 0.96 08/15/2021    MAG 1.8 08/15/2021    INR 1.34 (H) 08/10/2021         OVERALL ASSESSMENT AND PLAN   Nik Nava is a 61 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  Here for flu/cy/TBI and sib allo sct  HCT-CI score: 3 (prior solid tumor)  - GCSF starts on day +5 today until ANC >2500 x 2    2.  HEME:  - Pt/donor both Opos  - Transfuse for hgb <7g/dL; plt < 10k  - prolonged INR, " likely xarelto SE  - Recent pulmonary emboli: He developed a pulmonary emboli in the setting of receiving PEG asparaginase.  On xarelto. Will continue until plt count < 50k.     3.  History of kidney cancer: He has a history of renal cancer and has a prior resection.  For that reason he has a single unilateral kidney.     4.  Dental clearance: Ok to proceed.    5.  GVHD: Tac/MMF   - tac level 9.5 8/13; no changes.     6.  ID:   - prophy acv/letermovir, fluconazole, levaquin     - sulfa allergic; pentamidine at day +28    9. Hx of graves disease; On synthroid    10.  FEN/renal:  - cytoxan induced hyponatremia; resolved    10. GI: Constipation. Resolved; stopped scheduled senna; miralax PRN.     11. CV: Hypertension. Likely secondary to tacrolimus. Started norvasc 5mg daily. Monitor.       Patito Mims NP

## 2021-08-15 NOTE — PLAN OF CARE
"  Blood pressure 126/74, pulse 85, temperature 97.5  F (36.4  C), temperature source Axillary, resp. rate 18, height 1.835 m (6' 0.24\"), weight 105.5 kg (232 lb 8 oz), SpO2 97 %.    AVSS A/O x 4 Pt did not offer any complaints and he slept well after 1 mg of Ativan and Melatonin. Pt slept well and was up early walking the jerry. He is voiding good. Tacrolimus gtt at 120 mcg/hour. No replacement needed. Pt in good spirit. He is awaiting counts recovery. Continue to monitor pt and follow plan of care.      Problem: Adult Inpatient Plan of Care  Goal: Plan of Care Review  Outcome: No Change     Problem: Adult Inpatient Plan of Care  Goal: Patient-Specific Goal (Individualized)  Outcome: No Change     Problem: Adult Inpatient Plan of Care  Goal: Absence of Hospital-Acquired Illness or Injury  Intervention: Identify and Manage Fall Risk  Recent Flowsheet Documentation  Taken 8/15/2021 0400 by Alberta Marrero RN  Safety Promotion/Fall Prevention:   mobility aid in reach   lighting adjusted   fall prevention program maintained   clutter free environment maintained   nonskid shoes/slippers when out of bed  Taken 8/14/2021 2000 by Alberta Marrero RN  Safety Promotion/Fall Prevention:   mobility aid in reach   lighting adjusted   fall prevention program maintained   clutter free environment maintained   nonskid shoes/slippers when out of bed     Problem: Adult Inpatient Plan of Care  Goal: Absence of Hospital-Acquired Illness or Injury  Intervention: Prevent and Manage VTE (Venous Thromboembolism) Risk  Recent Flowsheet Documentation  Taken 8/15/2021 0400 by Alberta Marrero RN  VTE Prevention/Management: ambulation promoted  Taken 8/14/2021 2000 by Alberta Marrero RN  VTE Prevention/Management: ambulation promoted     Problem: Adult Inpatient Plan of Care  Goal: Optimal Comfort and Wellbeing  Outcome: No Change      "

## 2021-08-16 ENCOUNTER — APPOINTMENT (OUTPATIENT)
Dept: OCCUPATIONAL THERAPY | Facility: CLINIC | Age: 61
End: 2021-08-16
Attending: INTERNAL MEDICINE
Payer: COMMERCIAL

## 2021-08-16 LAB
A*LOCUS SEROLOGIC EQUIVALENT: 3
A*LOCUS: NORMAL
ABTEST METHOD: NORMAL
ALBUMIN SERPL-MCNC: 3.2 G/DL (ref 3.4–5)
ALP SERPL-CCNC: 49 U/L (ref 40–150)
ALT SERPL W P-5'-P-CCNC: 20 U/L (ref 0–70)
ANION GAP SERPL CALCULATED.3IONS-SCNC: 9 MMOL/L (ref 3–14)
AST SERPL W P-5'-P-CCNC: 13 U/L (ref 0–45)
B*: NORMAL
B*LOCUS SEROLOGIC EQUIVALENT: 7
B*LOCUS: NORMAL
B*SEROLOGIC EQUIVALENT: 49
BASOPHILS # BLD MANUAL: 0 10E3/UL (ref 0–0.2)
BASOPHILS NFR BLD MANUAL: 0 %
BILIRUB DIRECT SERPL-MCNC: 0.1 MG/DL (ref 0–0.2)
BILIRUB SERPL-MCNC: 0.8 MG/DL (ref 0.2–1.3)
BUN SERPL-MCNC: 20 MG/DL (ref 7–30)
BW-1: NORMAL
BW-2: NORMAL
C*: NORMAL
C*LOCUS SEROLOGIC EQUIVALENT: 7
C*LOCUS: NORMAL
C*SEROLOGIC EQUIVALENT: 7
CALCIUM SERPL-MCNC: 8.7 MG/DL (ref 8.5–10.1)
CHLORIDE BLD-SCNC: 107 MMOL/L (ref 94–109)
CO2 SERPL-SCNC: 23 MMOL/L (ref 20–32)
CREAT SERPL-MCNC: 0.96 MG/DL (ref 0.66–1.25)
DPA1*: NORMAL
DPB1*: NORMAL
DPB1*LOCUS NMDP: NORMAL
DPB1*LOCUS: NORMAL
DPB1*NMDP: NORMAL
DQA1*: NORMAL
DQA1*LOCUS: NORMAL
DQB1*: NORMAL
DQB1*LOCUS SEROLOGIC EQUIVALENT: 7
DQB1*LOCUS: NORMAL
DQB1*SEROLOGIC EQUIVALENT: 6
DRB1*: NORMAL
DRB1*LOCUS SEROLOGIC EQUIVALENT: 4
DRB1*LOCUS: NORMAL
DRB1*SEROLOGIC EQUIVALENT: 13
DRB3*LOCUS SEROLOGIC EQUIVALENT: 52
DRB3*LOCUS: NORMAL
DRB4*: NORMAL
DRB4*SEROLOGIC EQUIVALENT: 53
DRSSO TEST METHOD: NORMAL
EOSINOPHIL # BLD MANUAL: 0 10E3/UL (ref 0–0.7)
EOSINOPHIL NFR BLD MANUAL: 1 %
ERYTHROCYTE [DISTWIDTH] IN BLOOD BY AUTOMATED COUNT: 13.8 % (ref 10–15)
GFR SERPL CREATININE-BSD FRML MDRD: 84 ML/MIN/1.73M2
GLUCOSE BLD-MCNC: 94 MG/DL (ref 70–99)
HCT VFR BLD AUTO: 27.7 % (ref 40–53)
HGB BLD-MCNC: 9.2 G/DL (ref 13.3–17.7)
INR PPP: 1.32 (ref 0.85–1.15)
LYMPHOCYTES # BLD MANUAL: 0.2 10E3/UL (ref 0.8–5.3)
LYMPHOCYTES NFR BLD MANUAL: 39 %
MAGNESIUM SERPL-MCNC: 1.4 MG/DL (ref 1.6–2.3)
MAGNESIUM SERPL-MCNC: 1.9 MG/DL (ref 1.6–2.3)
MCH RBC QN AUTO: 32.7 PG (ref 26.5–33)
MCHC RBC AUTO-ENTMCNC: 33.2 G/DL (ref 31.5–36.5)
MCV RBC AUTO: 99 FL (ref 78–100)
MONOCYTES # BLD MANUAL: 0.1 10E3/UL (ref 0–1.3)
MONOCYTES NFR BLD MANUAL: 10 %
NEUTROPHILS # BLD MANUAL: 0.3 10E3/UL (ref 1.6–8.3)
NEUTROPHILS NFR BLD MANUAL: 50 %
NRBC # BLD AUTO: 0 10E3/UL
NRBC BLD MANUAL-RTO: 5 %
PHOSPHATE SERPL-MCNC: 3.3 MG/DL (ref 2.5–4.5)
PLAT MORPH BLD: ABNORMAL
PLATELET # BLD AUTO: 115 10E3/UL (ref 150–450)
POTASSIUM BLD-SCNC: 4.3 MMOL/L (ref 3.4–5.3)
PROT SERPL-MCNC: 6.4 G/DL (ref 6.8–8.8)
RBC # BLD AUTO: 2.81 10E6/UL (ref 4.4–5.9)
RBC MORPH BLD: ABNORMAL
SODIUM SERPL-SCNC: 139 MMOL/L (ref 133–144)
TACROLIMUS BLD-MCNC: 9.7 UG/L (ref 5–15)
TME LAST DOSE: NORMAL H
TME LAST DOSE: NORMAL H
WBC # BLD AUTO: 0.6 10E3/UL (ref 4–11)
ZZZABNGS COMMENTS: NORMAL
ZZZDRNGS COMMENTS: NORMAL

## 2021-08-16 PROCEDURE — 250N000013 HC RX MED GY IP 250 OP 250 PS 637: Performed by: INTERNAL MEDICINE

## 2021-08-16 PROCEDURE — 250N000013 HC RX MED GY IP 250 OP 250 PS 637: Performed by: PHYSICIAN ASSISTANT

## 2021-08-16 PROCEDURE — 250N000011 HC RX IP 250 OP 636: Performed by: PHYSICIAN ASSISTANT

## 2021-08-16 PROCEDURE — 99233 SBSQ HOSP IP/OBS HIGH 50: CPT | Performed by: INTERNAL MEDICINE

## 2021-08-16 PROCEDURE — 83735 ASSAY OF MAGNESIUM: CPT | Performed by: INTERNAL MEDICINE

## 2021-08-16 PROCEDURE — 250N000009 HC RX 250: Performed by: INTERNAL MEDICINE

## 2021-08-16 PROCEDURE — 258N000003 HC RX IP 258 OP 636: Performed by: PHYSICIAN ASSISTANT

## 2021-08-16 PROCEDURE — 206N000001 HC R&B BMT UMMC

## 2021-08-16 PROCEDURE — 85027 COMPLETE CBC AUTOMATED: CPT | Performed by: INTERNAL MEDICINE

## 2021-08-16 PROCEDURE — 97110 THERAPEUTIC EXERCISES: CPT | Mod: GO

## 2021-08-16 PROCEDURE — 250N000013 HC RX MED GY IP 250 OP 250 PS 637: Performed by: NURSE PRACTITIONER

## 2021-08-16 PROCEDURE — 258N000003 HC RX IP 258 OP 636: Performed by: INTERNAL MEDICINE

## 2021-08-16 PROCEDURE — 82248 BILIRUBIN DIRECT: CPT | Performed by: INTERNAL MEDICINE

## 2021-08-16 PROCEDURE — 85610 PROTHROMBIN TIME: CPT | Performed by: INTERNAL MEDICINE

## 2021-08-16 PROCEDURE — 84100 ASSAY OF PHOSPHORUS: CPT | Performed by: INTERNAL MEDICINE

## 2021-08-16 PROCEDURE — 82040 ASSAY OF SERUM ALBUMIN: CPT | Performed by: INTERNAL MEDICINE

## 2021-08-16 PROCEDURE — 80197 ASSAY OF TACROLIMUS: CPT | Performed by: INTERNAL MEDICINE

## 2021-08-16 PROCEDURE — 250N000011 HC RX IP 250 OP 636: Performed by: INTERNAL MEDICINE

## 2021-08-16 RX ORDER — MAGNESIUM SULFATE HEPTAHYDRATE 40 MG/ML
4 INJECTION, SOLUTION INTRAVENOUS ONCE
Status: COMPLETED | OUTPATIENT
Start: 2021-08-16 | End: 2021-08-16

## 2021-08-16 RX ADMIN — PANTOPRAZOLE SODIUM 40 MG: 40 TABLET, DELAYED RELEASE ORAL at 08:37

## 2021-08-16 RX ADMIN — MAGNESIUM SULFATE IN WATER 4 G: 40 INJECTION, SOLUTION INTRAVENOUS at 05:26

## 2021-08-16 RX ADMIN — TACROLIMUS 120 MCG/HR: 5 INJECTION, SOLUTION INTRAVENOUS at 20:33

## 2021-08-16 RX ADMIN — ACYCLOVIR 800 MG: 800 TABLET ORAL at 21:51

## 2021-08-16 RX ADMIN — AMLODIPINE BESYLATE 5 MG: 5 TABLET ORAL at 08:37

## 2021-08-16 RX ADMIN — ACETAMINOPHEN 650 MG: 325 TABLET, FILM COATED ORAL at 15:32

## 2021-08-16 RX ADMIN — Medication 1 G: at 20:32

## 2021-08-16 RX ADMIN — LEVOFLOXACIN 250 MG: 250 TABLET, FILM COATED ORAL at 11:23

## 2021-08-16 RX ADMIN — ACETAMINOPHEN 650 MG: 325 TABLET, FILM COATED ORAL at 20:52

## 2021-08-16 RX ADMIN — Medication 5 MG: at 20:52

## 2021-08-16 RX ADMIN — ACYCLOVIR 800 MG: 800 TABLET ORAL at 15:08

## 2021-08-16 RX ADMIN — ACYCLOVIR 800 MG: 800 TABLET ORAL at 18:24

## 2021-08-16 RX ADMIN — MYCOPHENOLATE MOFETIL 1500 MG: 500 INJECTION, POWDER, LYOPHILIZED, FOR SOLUTION INTRAVENOUS at 20:41

## 2021-08-16 RX ADMIN — ACYCLOVIR 800 MG: 800 TABLET ORAL at 08:37

## 2021-08-16 RX ADMIN — URSODIOL 300 MG: 300 CAPSULE ORAL at 15:08

## 2021-08-16 RX ADMIN — LEVOTHYROXINE SODIUM 200 MCG: 0.05 TABLET ORAL at 08:36

## 2021-08-16 RX ADMIN — DEXTROSE MONOHYDRATE 20 ML: 50 INJECTION, SOLUTION INTRAVENOUS at 20:35

## 2021-08-16 RX ADMIN — FLUCONAZOLE 200 MG: 200 TABLET ORAL at 08:36

## 2021-08-16 RX ADMIN — MYCOPHENOLATE MOFETIL 1500 MG: 500 INJECTION, POWDER, LYOPHILIZED, FOR SOLUTION INTRAVENOUS at 08:25

## 2021-08-16 RX ADMIN — DEXTROSE MONOHYDRATE 20 ML: 50 INJECTION, SOLUTION INTRAVENOUS at 20:40

## 2021-08-16 RX ADMIN — URSODIOL 300 MG: 300 CAPSULE ORAL at 20:33

## 2021-08-16 RX ADMIN — ACYCLOVIR 800 MG: 800 TABLET ORAL at 11:23

## 2021-08-16 RX ADMIN — RIVAROXABAN 20 MG: 20 TABLET, FILM COATED ORAL at 18:25

## 2021-08-16 RX ADMIN — FILGRASTIM 480 MCG: 480 INJECTION, SOLUTION INTRAVENOUS; SUBCUTANEOUS at 20:34

## 2021-08-16 RX ADMIN — URSODIOL 300 MG: 300 CAPSULE ORAL at 08:37

## 2021-08-16 RX ADMIN — LORAZEPAM 1 MG: 0.5 TABLET ORAL at 20:53

## 2021-08-16 ASSESSMENT — ACTIVITIES OF DAILY LIVING (ADL)
ADLS_ACUITY_SCORE: 14

## 2021-08-16 ASSESSMENT — MIFFLIN-ST. JEOR: SCORE: 1865.17

## 2021-08-16 NOTE — PROGRESS NOTES
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  He has no new medical complaints.  Eating ok.  No n/v/d.  Afebrile.  No rash.      Review of Systems    Review of systems was negative other than noted above.     PHYSICAL EXAM      Weight     Wt Readings from Last 3 Encounters:   08/15/21 104.9 kg (231 lb 3.2 oz)   07/19/21 108 kg (238 lb)   07/13/21 108 kg (238 lb)          /75 (BP Location: Left arm)   Pulse 95   Temp 97.5  F (36.4  C) (Axillary)   Resp 18   Ht 1.835 m (6' 0.24\")   Wt 104.9 kg (231 lb 3.2 oz)   SpO2 96%   BMI 31.14 kg/m       KPS: 90  General: NAD, seen in hallway today   Eyes: sclera anicteric   Lungs: breathing comfortably on RA  Skin: No rashes or petechaie  Neuro: A&O, speech normal  Vascular Access: port in the right chest, left double lumen blake    Labs:  Lab Results   Component Value Date    WBC 0.6 (LL) 08/16/2021    ANEU 0.3 (LL) 08/16/2021    HGB 9.2 (L) 08/16/2021    HCT 27.7 (L) 08/16/2021     (L) 08/16/2021     08/16/2021    POTASSIUM 4.3 08/16/2021    CHLORIDE 107 08/16/2021    CO2 23 08/16/2021    GLC 94 08/16/2021    BUN 20 08/16/2021    CR 0.96 08/16/2021    MAG 1.4 (L) 08/16/2021    INR 1.32 (H) 08/16/2021         OVERALL ASSESSMENT AND PLAN   Nik Nava is a 61 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  Here for flu/cy/TBI and sib allo sct  HCT-CI score: 3 (prior solid tumor)  - GCSF started on day +5 today until ANC >2500 x 2    2.  HEME:  - Pt/donor both Opos  - Transfuse for hgb <7g/dL; plt < 10k  - prolonged INR, likely xarelto SE  - Recent pulmonary emboli: " He developed a pulmonary emboli in the setting of receiving PEG asparaginase.  On xarelto. Will continue until plt count < 50k.     3.  History of kidney cancer: He has a history of renal cancer and has a prior resection.  For that reason he has a single unilateral kidney.     4.  Dental clearance: Ok to proceed.    5.  GVHD: Tac/MMF   - tac level 9.5 8/13; no changes. Level pending from today.    6.  ID:   - prophy acv/letermovir, fluconazole, levaquin     - sulfa allergic; pentamidine at day +28    9. Hx of graves disease; On synthroid    10.  FEN/renal:  - cytoxan induced hyponatremia; resolved    10. GI: Constipation. Resolved; stopped scheduled senna; miralax PRN.     11. CV: Hypertension. Likely secondary to tacrolimus. Started norvasc 5mg daily. Monitor.     Lashanda Figueroa pa-c  [phone]

## 2021-08-16 NOTE — PLAN OF CARE
"  Blood pressure 128/75, pulse 95, temperature 97.5  F (36.4  C), temperature source Axillary, resp. rate 18, height 1.835 m (6' 0.24\"), weight 104.9 kg (231 lb 3.2 oz), SpO2 96 %.    Pt is very pleasant with stable VSS as recorded above. A/O x 4. Pt started G-CSF yesterday. He gets up independently in jerry and his only complaint this morning was LE muscle spasms. His Magnesium level is 1.4 and he is getting 4 g of Magnesium sulfate. Tac gtt is at 120 mcg/hour = 6 ML / hour. He will need Tac level  today. Voids good amounts. Awaiting count recovery.  Continue to monitor pt and follow plan of care.      Problem: Adult Inpatient Plan of Care  Goal: Plan of Care Review  Outcome: No Change     Problem: Adult Inpatient Plan of Care  Goal: Patient-Specific Goal (Individualized)  Outcome: No Change     Problem: Adjustment to Transplant (Stem Cell/Bone Marrow Transplant)  Goal: Optimal Coping with Transplant  Outcome: No Change     Problem: Adult Inpatient Plan of Care  Goal: Optimal Comfort and Wellbeing  Outcome: No Change     Problem: Diarrhea (Stem Cell/Bone Marrow Transplant)  Goal: Diarrhea Symptom Control  Outcome: No Change     Problem: Fatigue (Stem Cell/Bone Marrow Transplant)  Goal: Energy Level Supports Daily Activity  Outcome: No Change     Problem: Nausea and Vomiting (Stem Cell/Bone Marrow Transplant)  Goal: Nausea and Vomiting Symptom Relief  Outcome: No Change     "

## 2021-08-16 NOTE — PLAN OF CARE
AVSS on room air. Pt denies pain or nausea. Fair appetite. Pt expresses concern about losing weight. Pt asking good questions regarding plan of care and discharge goals. Pt verbalizes understanding. Independent in room and halls; walked several times today. Tac level drawn this morning. In good spirits. 4g Mg replaced this morning, recheck tomorrow morning. Pt with recurred pain to right thigh this afternoon that he describes as crushing and also caused him to limp. Given tylenol with good relief. Pain possibly from low Mg vs bone pain? Pt may benefit from claritin.  Mag recheck pending.    Problem: Adult Inpatient Plan of Care  Goal: Plan of Care Review  Outcome: No Change  Flowsheets (Taken 8/16/2021 1314)  Plan of Care Reviewed With: patient  Progress: no change  Goal: Absence of Hospital-Acquired Illness or Injury  Intervention: Identify and Manage Fall Risk  Recent Flowsheet Documentation  Taken 8/16/2021 0800 by Malinda Davis RN  Safety Promotion/Fall Prevention:    clutter free environment maintained    lighting adjusted    nonskid shoes/slippers when out of bed    patient and family education  Intervention: Prevent Skin Injury  Recent Flowsheet Documentation  Taken 8/16/2021 0800 by Malinda Davis RN  Body Position: position changed independently  Intervention: Prevent and Manage VTE (Venous Thromboembolism) Risk  Recent Flowsheet Documentation  Taken 8/16/2021 0800 by Malinda Davis RN  VTE Prevention/Management:    ambulation promoted    AROM (active range of motion) performed  Goal: Optimal Comfort and Wellbeing  Outcome: No Change

## 2021-08-17 LAB
ANION GAP SERPL CALCULATED.3IONS-SCNC: 4 MMOL/L (ref 3–14)
BASOPHILS # BLD MANUAL: 0 10E3/UL (ref 0–0.2)
BASOPHILS NFR BLD MANUAL: 0 %
BUN SERPL-MCNC: 27 MG/DL (ref 7–30)
CALCIUM SERPL-MCNC: 9.2 MG/DL (ref 8.5–10.1)
CHLORIDE BLD-SCNC: 110 MMOL/L (ref 94–109)
CO2 SERPL-SCNC: 24 MMOL/L (ref 20–32)
CREAT SERPL-MCNC: 1.04 MG/DL (ref 0.66–1.25)
ELLIPTOCYTES BLD QL SMEAR: SLIGHT
EOSINOPHIL # BLD MANUAL: 0 10E3/UL (ref 0–0.7)
EOSINOPHIL NFR BLD MANUAL: 0 %
ERYTHROCYTE [DISTWIDTH] IN BLOOD BY AUTOMATED COUNT: 13.9 % (ref 10–15)
GFR SERPL CREATININE-BSD FRML MDRD: 76 ML/MIN/1.73M2
GLUCOSE BLD-MCNC: 94 MG/DL (ref 70–99)
HCT VFR BLD AUTO: 27.5 % (ref 40–53)
HGB BLD-MCNC: 9.3 G/DL (ref 13.3–17.7)
LACTATE SERPL-SCNC: 0.4 MMOL/L (ref 0.7–2)
LYMPHOCYTES # BLD MANUAL: 0.2 10E3/UL (ref 0.8–5.3)
LYMPHOCYTES NFR BLD MANUAL: 40 %
MAGNESIUM SERPL-MCNC: 1.8 MG/DL (ref 1.6–2.3)
MCH RBC QN AUTO: 33.3 PG (ref 26.5–33)
MCHC RBC AUTO-ENTMCNC: 33.8 G/DL (ref 31.5–36.5)
MCV RBC AUTO: 99 FL (ref 78–100)
MONOCYTES # BLD MANUAL: 0.1 10E3/UL (ref 0–1.3)
MONOCYTES NFR BLD MANUAL: 16 %
NEUTROPHILS # BLD MANUAL: 0.3 10E3/UL (ref 1.6–8.3)
NEUTROPHILS NFR BLD MANUAL: 44 %
PHOSPHATE SERPL-MCNC: 4.1 MG/DL (ref 2.5–4.5)
PLAT MORPH BLD: ABNORMAL
PLATELET # BLD AUTO: 86 10E3/UL (ref 150–450)
POTASSIUM BLD-SCNC: 4.5 MMOL/L (ref 3.4–5.3)
RBC # BLD AUTO: 2.79 10E6/UL (ref 4.4–5.9)
RBC MORPH BLD: ABNORMAL
SARS-COV-2 RNA RESP QL NAA+PROBE: NEGATIVE
SODIUM SERPL-SCNC: 138 MMOL/L (ref 133–144)
WBC # BLD AUTO: 0.6 10E3/UL (ref 4–11)

## 2021-08-17 PROCEDURE — 87081 CULTURE SCREEN ONLY: CPT | Performed by: INTERNAL MEDICINE

## 2021-08-17 PROCEDURE — 80048 BASIC METABOLIC PNL TOTAL CA: CPT | Performed by: INTERNAL MEDICINE

## 2021-08-17 PROCEDURE — 206N000001 HC R&B BMT UMMC

## 2021-08-17 PROCEDURE — 250N000009 HC RX 250: Performed by: INTERNAL MEDICINE

## 2021-08-17 PROCEDURE — 250N000013 HC RX MED GY IP 250 OP 250 PS 637: Performed by: PHYSICIAN ASSISTANT

## 2021-08-17 PROCEDURE — 250N000011 HC RX IP 250 OP 636: Performed by: PHYSICIAN ASSISTANT

## 2021-08-17 PROCEDURE — 250N000013 HC RX MED GY IP 250 OP 250 PS 637: Performed by: NURSE PRACTITIONER

## 2021-08-17 PROCEDURE — 85041 AUTOMATED RBC COUNT: CPT | Performed by: INTERNAL MEDICINE

## 2021-08-17 PROCEDURE — 250N000011 HC RX IP 250 OP 636: Performed by: INTERNAL MEDICINE

## 2021-08-17 PROCEDURE — 84100 ASSAY OF PHOSPHORUS: CPT | Performed by: INTERNAL MEDICINE

## 2021-08-17 PROCEDURE — U0003 INFECTIOUS AGENT DETECTION BY NUCLEIC ACID (DNA OR RNA); SEVERE ACUTE RESPIRATORY SYNDROME CORONAVIRUS 2 (SARS-COV-2) (CORONAVIRUS DISEASE [COVID-19]), AMPLIFIED PROBE TECHNIQUE, MAKING USE OF HIGH THROUGHPUT TECHNOLOGIES AS DESCRIBED BY CMS-2020-01-R: HCPCS | Performed by: PHYSICIAN ASSISTANT

## 2021-08-17 PROCEDURE — 83605 ASSAY OF LACTIC ACID: CPT | Performed by: INTERNAL MEDICINE

## 2021-08-17 PROCEDURE — 83735 ASSAY OF MAGNESIUM: CPT | Performed by: INTERNAL MEDICINE

## 2021-08-17 PROCEDURE — 250N000013 HC RX MED GY IP 250 OP 250 PS 637: Performed by: INTERNAL MEDICINE

## 2021-08-17 PROCEDURE — 258N000003 HC RX IP 258 OP 636: Performed by: PHYSICIAN ASSISTANT

## 2021-08-17 PROCEDURE — 99233 SBSQ HOSP IP/OBS HIGH 50: CPT | Performed by: INTERNAL MEDICINE

## 2021-08-17 PROCEDURE — 258N000003 HC RX IP 258 OP 636: Performed by: INTERNAL MEDICINE

## 2021-08-17 RX ORDER — LORATADINE 10 MG/1
10 TABLET ORAL DAILY
Status: DISCONTINUED | OUTPATIENT
Start: 2021-08-17 | End: 2021-08-22 | Stop reason: HOSPADM

## 2021-08-17 RX ADMIN — RIVAROXABAN 20 MG: 20 TABLET, FILM COATED ORAL at 18:12

## 2021-08-17 RX ADMIN — MYCOPHENOLATE MOFETIL 1500 MG: 500 INJECTION, POWDER, LYOPHILIZED, FOR SOLUTION INTRAVENOUS at 07:56

## 2021-08-17 RX ADMIN — DEXTROSE MONOHYDRATE 20 ML: 50 INJECTION, SOLUTION INTRAVENOUS at 21:30

## 2021-08-17 RX ADMIN — LEVOTHYROXINE SODIUM 200 MCG: 0.05 TABLET ORAL at 07:56

## 2021-08-17 RX ADMIN — URSODIOL 300 MG: 300 CAPSULE ORAL at 20:54

## 2021-08-17 RX ADMIN — Medication 5 MG: at 22:23

## 2021-08-17 RX ADMIN — PANTOPRAZOLE SODIUM 40 MG: 40 TABLET, DELAYED RELEASE ORAL at 07:56

## 2021-08-17 RX ADMIN — ACYCLOVIR 800 MG: 800 TABLET ORAL at 13:56

## 2021-08-17 RX ADMIN — LEVOFLOXACIN 250 MG: 250 TABLET, FILM COATED ORAL at 10:15

## 2021-08-17 RX ADMIN — ACETAMINOPHEN 650 MG: 325 TABLET, FILM COATED ORAL at 04:06

## 2021-08-17 RX ADMIN — TACROLIMUS 120 MCG/HR: 5 INJECTION, SOLUTION INTRAVENOUS at 20:43

## 2021-08-17 RX ADMIN — DEXTROSE MONOHYDRATE 20 ML: 50 INJECTION, SOLUTION INTRAVENOUS at 20:53

## 2021-08-17 RX ADMIN — URSODIOL 300 MG: 300 CAPSULE ORAL at 07:56

## 2021-08-17 RX ADMIN — ACYCLOVIR 800 MG: 800 TABLET ORAL at 10:15

## 2021-08-17 RX ADMIN — FLUCONAZOLE 200 MG: 200 TABLET ORAL at 07:56

## 2021-08-17 RX ADMIN — FILGRASTIM 480 MCG: 480 INJECTION, SOLUTION INTRAVENOUS; SUBCUTANEOUS at 20:53

## 2021-08-17 RX ADMIN — MYCOPHENOLATE MOFETIL 1500 MG: 500 INJECTION, POWDER, LYOPHILIZED, FOR SOLUTION INTRAVENOUS at 20:54

## 2021-08-17 RX ADMIN — ACYCLOVIR 800 MG: 800 TABLET ORAL at 18:12

## 2021-08-17 RX ADMIN — AMLODIPINE BESYLATE 5 MG: 5 TABLET ORAL at 07:56

## 2021-08-17 RX ADMIN — ACYCLOVIR 800 MG: 800 TABLET ORAL at 22:23

## 2021-08-17 RX ADMIN — URSODIOL 300 MG: 300 CAPSULE ORAL at 13:56

## 2021-08-17 RX ADMIN — ACYCLOVIR 800 MG: 800 TABLET ORAL at 07:56

## 2021-08-17 RX ADMIN — LORATADINE 10 MG: 10 TABLET ORAL at 07:56

## 2021-08-17 RX ADMIN — LORAZEPAM 1 MG: 0.5 TABLET ORAL at 22:23

## 2021-08-17 RX ADMIN — NICOTINE POLACRILEX 4 MG: 4 GUM, CHEWING BUCCAL at 08:11

## 2021-08-17 ASSESSMENT — ACTIVITIES OF DAILY LIVING (ADL)
ADLS_ACUITY_SCORE: 14

## 2021-08-17 ASSESSMENT — MIFFLIN-ST. JEOR: SCORE: 1869.71

## 2021-08-17 NOTE — PROGRESS NOTES
CLINICAL NUTRITION SERVICES - REASSESSMENT NOTE     Nutrition Prescription    RECOMMENDATIONS FOR MDs/PROVIDERS TO ORDER:  Bowel regimen per MD, last BM on 8/13 per I/O records    Malnutrition Status:    Patient does not meet two of the established criteria necessary for diagnosing malnutrition but is at risk for malnutrition    Recommendations already ordered by Registered Dietitian (RD):  Snacks/supplements PRN -- pt prefers drinks brought in from home but is aware can order supplements carried in house PRN    Future/Additional Recommendations:  Continue to monitor wt trends - current po intakes good    If TPN becomes POC in post-transplant course, rec via central line:  --Use dosing weight 87 kg (adjusted)  --Begin TPN, goal volume minimal per pharmD (~1200 ml/day) with initial 185g Dex daily (629kcal, GIR1.5), 120g AA daily (480 kcal), and 250 ml 20% IV lipids daily.  Micro/Rx: infuvite+MTE4  --ONLY once pt receives ~100% of initial continuous PN volume with K+/Mg++/Phos WNL, advance PN dex by 95 g every 1-2 days (pending lytes/Glu and Pharm D/RD discretion) to initial goal of 375g Dex (1275 kcal) to increase provisions to 2255 kcals (25 kcal/kg/day), 1.4 g PRO/kg/day, GIR 3.0 with 22% kcals from Fat.  --monitor bili, LFTs, TG weekly while on TPN  --monitor for onset of hyperglycemia with TPN start and need for addition of insulin if indicated     EVALUATION OF THE PROGRESS TOWARD GOALS   Diet: High Kcal/High Protein, supplements PRN    Nutrition Support: None at this time     Intake: Consistent 100% intakes at meals per flowsheets    --fair appetite     NEW FINDINGS   Nik reports he had a few days where appetite was down but this has resolved and has great appetite. Ate 100% of three full meals yesterday with additional 2 premier protein/richey oral nutrition supplements. Focusing on getting in adequate protein and reviewed protein sources on hospital menu. Encouraged to continue focus on good po intakes. Pt  looking forward to son brining in food from home. All questions/concerns addressed.   Weight: admit wt 107.6 kg (8/3) --> 102.8 kg (8/16)  --4.5% wt loss over past ~2 weeks    Labs: Cl 110H, remaining lytes WNL, bili/LFTs WNL 8/16, Euglycemia    Meds: tacrolimus    GI: last BM on 8/13    MALNUTRITION  Limited physical exam with home clothes (heavy sweatshirt, pants)  % Intake: Decreased intake does not meet criteria  % Weight Loss: Weight loss does not meet criteria  Subcutaneous Fat Loss: None observed  Muscle Loss: None observed  Fluid Accumulation/Edema: Does not meet criteria  Malnutrition Diagnosis: Patient does not meet two of the established criteria necessary for diagnosing malnutrition but is at risk for malnutrition    Previous Goals   Patient to consume % of nutritionally adequate meal trays TID, or the equivalent with supplements/snacks.  Evaluation: Met    Previous Nutrition Diagnosis  Predicted inadequate nutrient intake (calories, protein) related to potential to develop decreased po in post-transplant course and/or menu fatigue with LOS    Evaluation: No longer applicable, nutrition diagnosis changed below    CURRENT NUTRITION DIAGNOSIS  Unintended weight loss related to few days of decreased po in post transplant course as evidenced by ~4.5% wt loss over past ~2 weeks of admission      INTERVENTIONS  Implementation  Collaboration with other providers -5C rounds  Medical food supplement therapy - PRN  Parenteral Nutrition/IV Fluids - recs above if becomes POC    Goals  Patient to consume % of nutritionally adequate meal trays TID, or the equivalent with supplements/snacks.    Monitoring/Evaluation  Progress toward goals will be monitored and evaluated per protocol.    Raquel Stewart MS, RD, , CNSC, LD.  5C/BMT Pager:[phone]

## 2021-08-17 NOTE — PROGRESS NOTES
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  He notes pain in R thigh.  No weakness, no trauma.  It happened yesterday but resolved.  No other medical complaints. Eating well.  No n/v/d.  Afebrile.  No rash.      Review of Systems    Review of systems was negative other than noted above.     PHYSICAL EXAM      Weight     Wt Readings from Last 3 Encounters:   08/17/21 103.3 kg (227 lb 11.2 oz)   07/19/21 108 kg (238 lb)   07/13/21 108 kg (238 lb)          BP (!) 127/91   Pulse 77   Temp 98  F (36.7  C) (Oral)   Resp 18   Ht 1.835 m (6' 0.24\")   Wt 103.3 kg (227 lb 11.2 oz)   SpO2 99%   BMI 30.67 kg/m       KPS: 90  General: NAD, seen in hallway today   Eyes: sclera anicteric   Lungs: breathing comfortably on RA  Skin: No rashes or petechaie  Neuro: A&O, speech normal  EXT:  R upper leg with full ROM, full strength, no rash/hematoma/petechiae.  No gross deformity.  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  Here for flu/cy/TBI and sib allo sct  HCT-CI score: 3 (prior solid tumor)  - GCSF started on day +5 today until ANC " >2500 x 2; started daily claritin    2.  HEME:  - Pt/donor both Opos  - Transfuse for hgb <7g/dL; plt < 10k  - prolonged INR, likely xarelto SE  - Recent pulmonary emboli: He developed a pulmonary emboli in the setting of receiving PEG asparaginase.  On xarelto. Will continue until plt count < 50k.     3.  FEN/renal:  - lytes OK  - History of kidney cancer: He has a history of renal cancer and has a prior resection.  For that reason he has a single unilateral kidney.     4.  Dental clearance: Ok to proceed.    5.  GVHD: Tac/MMF   - tac level 9.5 8/13; no changes. Level pending from today.    6.  ID:   - prophy acv/letermovir, fluconazole, levaquin   - sulfa allergic; pentamidine at day +28    9. Hx of graves disease; On synthroid    10. GI: Constipation. Resolved; stopped scheduled senna; miralax PRN.     11. CV: Hypertension. Likely secondary to tacrolimus. Started norvasc 5mg daily. Monitor.     Lashanda Figueroa pa-c  [phone]

## 2021-08-17 NOTE — PLAN OF CARE
Pt covid tested and negative, VRE tested and awaiting results. Pt was started on claritin today to help with aches. Pt has been up in halls walking. Pt is using about 10 pieces of nicotine gum per day but does not like hospital supply so uses home supply. Pt remains on tac gtt at 120mcg/hour.  Problem: Adult Inpatient Plan of Care  Goal: Plan of Care Review  Outcome: No Change  Flowsheets (Taken 8/17/2021 1724)  Plan of Care Reviewed With: patient  Progress: no change  Goal: Patient-Specific Goal (Individualized)  Outcome: No Change  Goal: Absence of Hospital-Acquired Illness or Injury  Outcome: No Change  Intervention: Identify and Manage Fall Risk  Recent Flowsheet Documentation  Taken 8/17/2021 0800 by Jenise Lopez RN  Safety Promotion/Fall Prevention: assistive device/personal items within reach  Intervention: Prevent Skin Injury  Recent Flowsheet Documentation  Taken 8/17/2021 0800 by Jenise Lopez RN  Body Position: position changed independently  Intervention: Prevent and Manage VTE (Venous Thromboembolism) Risk  Recent Flowsheet Documentation  Taken 8/17/2021 0800 by Jenise Lopez RN  VTE Prevention/Management: ambulation promoted  Goal: Optimal Comfort and Wellbeing  Outcome: No Change  Goal: Readiness for Transition of Care  Outcome: No Change     Problem: Adjustment to Transplant (Stem Cell/Bone Marrow Transplant)  Goal: Optimal Coping with Transplant  Outcome: No Change     Problem: Bladder Irritation (Stem Cell/Bone Marrow Transplant)  Goal: Symptom-Free Urinary Elimination  Outcome: No Change  Intervention: Monitor and Manage Bladder Irritation  Recent Flowsheet Documentation  Taken 8/17/2021 0828 by Jenise Lopez RN  Pain Management Interventions: medication (see MAR)     Problem: Diarrhea (Stem Cell/Bone Marrow Transplant)  Goal: Diarrhea Symptom Control  Outcome: No Change     Problem: Fatigue (Stem Cell/Bone Marrow Transplant)  Goal: Energy Level Supports Daily  Activity  Outcome: No Change     Problem: Hematologic Alteration (Stem Cell/Bone Marrow Transplant)  Goal: Blood Counts Within Acceptable Range  Outcome: No Change     Problem: Hypersensitivity Reaction (Stem Cell/Bone Marrow Transplant)  Goal: Absence of Hypersensitivity Reaction  Outcome: No Change     Problem: Infection Risk (Stem Cell/Bone Marrow Transplant)  Goal: Absence of Infection  Outcome: No Change     Problem: Mucositis (Stem Cell/Bone Marrow Transplant)  Goal: Mucous Membrane Health and Integrity  Outcome: No Change     Problem: Nausea and Vomiting (Stem Cell/Bone Marrow Transplant)  Goal: Nausea and Vomiting Symptom Relief  Outcome: No Change     Problem: Nutrition Intake Altered (Stem Cell/Bone Marrow Transplant)  Goal: Optimal Nutrition Intake  Outcome: No Change     Problem: Discharge Planning  Goal: Discharge Planning (Adult, OB, Behavioral, Peds)  Outcome: No Change

## 2021-08-17 NOTE — PLAN OF CARE
"/73 (BP Location: Left arm)   Pulse 92   Temp 97.7  F (36.5  C) (Oral)   Resp 18   Ht 1.835 m (6' 0.24\")   Wt 102.8 kg (226 lb 11.2 oz)   SpO2 96%   BMI 30.54 kg/m      Independent. A&Ox4. Denies N/V/D. Pt complained of thigh pain. PRN  given x2 tylenol, x1 ativan and x1 melatonin . No replacement needed at this time. Tac infusing at 6ml/hr.  No BM during shift, pt uses urinal and had good urine output. Pt needs VRE swab and is aware. Continue with plan of care.     Problem: Adult Inpatient Plan of Care  Goal: Plan of Care Review  Outcome: No Change  Goal: Patient-Specific Goal (Individualized)  Outcome: No Change  Goal: Absence of Hospital-Acquired Illness or Injury  Outcome: No Change  Intervention: Identify and Manage Fall Risk  Recent Flowsheet Documentation  Taken 8/16/2021 2300 by Natty Streeter RN  Safety Promotion/Fall Prevention:    clutter free environment maintained    fall prevention program maintained    nonskid shoes/slippers when out of bed    patient and family education    safety round/check completed  Intervention: Prevent Skin Injury  Recent Flowsheet Documentation  Taken 8/16/2021 2300 by Natty Streeter RN  Body Position: position changed independently  Intervention: Prevent and Manage VTE (Venous Thromboembolism) Risk  Recent Flowsheet Documentation  Taken 8/16/2021 2300 by Natty Streeter RN  VTE Prevention/Management:    ambulation promoted    bleeding risk assessed  Goal: Optimal Comfort and Wellbeing  Outcome: No Change     Problem: Adjustment to Transplant (Stem Cell/Bone Marrow Transplant)  Goal: Optimal Coping with Transplant  Outcome: No Change     Problem: Bladder Irritation (Stem Cell/Bone Marrow Transplant)  Goal: Symptom-Free Urinary Elimination  Outcome: No Change  Intervention: Monitor and Manage Bladder Irritation  Recent Flowsheet Documentation  Taken 8/17/2021 0451 by Natty Streeter RN  Pain Management Interventions: medication (see MAR)  Taken 8/16/2021 " 2137 by Natty Streeter, RN  Pain Management Interventions: medication (see MAR)     Problem: Diarrhea (Stem Cell/Bone Marrow Transplant)  Goal: Diarrhea Symptom Control  Outcome: No Change     Problem: Fatigue (Stem Cell/Bone Marrow Transplant)  Goal: Energy Level Supports Daily Activity  Outcome: No Change     Problem: Hematologic Alteration (Stem Cell/Bone Marrow Transplant)  Goal: Blood Counts Within Acceptable Range  Outcome: No Change     Problem: Hypersensitivity Reaction (Stem Cell/Bone Marrow Transplant)  Goal: Absence of Hypersensitivity Reaction  Outcome: No Change     Problem: Infection Risk (Stem Cell/Bone Marrow Transplant)  Goal: Absence of Infection  Outcome: No Change     Problem: Mucositis (Stem Cell/Bone Marrow Transplant)  Goal: Mucous Membrane Health and Integrity  Outcome: No Change  Intervention: Maintain Oral Mucous Membrane Integrity  Recent Flowsheet Documentation  Taken 8/16/2021 2300 by Natty Streeter, RN  Oral Care: oral rinse provided     Problem: Nausea and Vomiting (Stem Cell/Bone Marrow Transplant)  Goal: Nausea and Vomiting Symptom Relief  Outcome: No Change     Problem: Nutrition Intake Altered (Stem Cell/Bone Marrow Transplant)  Goal: Optimal Nutrition Intake  Outcome: No Change

## 2021-08-18 ENCOUNTER — APPOINTMENT (OUTPATIENT)
Dept: OCCUPATIONAL THERAPY | Facility: CLINIC | Age: 61
End: 2021-08-18
Attending: INTERNAL MEDICINE
Payer: COMMERCIAL

## 2021-08-18 LAB
ANION GAP SERPL CALCULATED.3IONS-SCNC: 6 MMOL/L (ref 3–14)
BASOPHILS # BLD MANUAL: 0 10E3/UL (ref 0–0.2)
BASOPHILS NFR BLD MANUAL: 0 %
BUN SERPL-MCNC: 25 MG/DL (ref 7–30)
CALCIUM SERPL-MCNC: 9.1 MG/DL (ref 8.5–10.1)
CHLORIDE BLD-SCNC: 109 MMOL/L (ref 94–109)
CMV DNA SPEC NAA+PROBE-ACNC: NOT DETECTED IU/ML
CO2 SERPL-SCNC: 22 MMOL/L (ref 20–32)
CREAT SERPL-MCNC: 1.02 MG/DL (ref 0.66–1.25)
EOSINOPHIL # BLD MANUAL: 0 10E3/UL (ref 0–0.7)
EOSINOPHIL NFR BLD MANUAL: 1 %
ERYTHROCYTE [DISTWIDTH] IN BLOOD BY AUTOMATED COUNT: 14.1 % (ref 10–15)
GFR SERPL CREATININE-BSD FRML MDRD: 78 ML/MIN/1.73M2
GLUCOSE BLD-MCNC: 103 MG/DL (ref 70–99)
HCT VFR BLD AUTO: 27.6 % (ref 40–53)
HGB BLD-MCNC: 9.3 G/DL (ref 13.3–17.7)
LYMPHOCYTES # BLD MANUAL: 0.3 10E3/UL (ref 0.8–5.3)
LYMPHOCYTES NFR BLD MANUAL: 29 %
MAGNESIUM SERPL-MCNC: 1.5 MG/DL (ref 1.6–2.3)
MCH RBC QN AUTO: 33.5 PG (ref 26.5–33)
MCHC RBC AUTO-ENTMCNC: 33.7 G/DL (ref 31.5–36.5)
MCV RBC AUTO: 99 FL (ref 78–100)
MONOCYTES # BLD MANUAL: 0.1 10E3/UL (ref 0–1.3)
MONOCYTES NFR BLD MANUAL: 11 %
NEUTROPHILS # BLD MANUAL: 0.6 10E3/UL (ref 1.6–8.3)
NEUTROPHILS NFR BLD MANUAL: 59 %
NRBC # BLD AUTO: 0 10E3/UL
NRBC BLD MANUAL-RTO: 1 %
PHOSPHATE SERPL-MCNC: 3.7 MG/DL (ref 2.5–4.5)
PLAT MORPH BLD: ABNORMAL
PLATELET # BLD AUTO: 66 10E3/UL (ref 150–450)
POTASSIUM BLD-SCNC: 4.4 MMOL/L (ref 3.4–5.3)
RBC # BLD AUTO: 2.78 10E6/UL (ref 4.4–5.9)
RBC MORPH BLD: ABNORMAL
SODIUM SERPL-SCNC: 137 MMOL/L (ref 133–144)
TACROLIMUS BLD-MCNC: 12.6 UG/L (ref 5–15)
TME LAST DOSE: NORMAL H
TME LAST DOSE: NORMAL H
WBC # BLD AUTO: 1.1 10E3/UL (ref 4–11)

## 2021-08-18 PROCEDURE — 250N000011 HC RX IP 250 OP 636: Performed by: PHYSICIAN ASSISTANT

## 2021-08-18 PROCEDURE — 97530 THERAPEUTIC ACTIVITIES: CPT | Mod: GO

## 2021-08-18 PROCEDURE — 83735 ASSAY OF MAGNESIUM: CPT | Performed by: INTERNAL MEDICINE

## 2021-08-18 PROCEDURE — 250N000013 HC RX MED GY IP 250 OP 250 PS 637: Performed by: INTERNAL MEDICINE

## 2021-08-18 PROCEDURE — 99233 SBSQ HOSP IP/OBS HIGH 50: CPT | Performed by: INTERNAL MEDICINE

## 2021-08-18 PROCEDURE — 999N000147 HC STATISTIC PT IP EVAL DEFER

## 2021-08-18 PROCEDURE — 97110 THERAPEUTIC EXERCISES: CPT | Mod: GO

## 2021-08-18 PROCEDURE — 85027 COMPLETE CBC AUTOMATED: CPT | Performed by: INTERNAL MEDICINE

## 2021-08-18 PROCEDURE — 250N000013 HC RX MED GY IP 250 OP 250 PS 637: Performed by: NURSE PRACTITIONER

## 2021-08-18 PROCEDURE — 250N000013 HC RX MED GY IP 250 OP 250 PS 637: Performed by: PHYSICIAN ASSISTANT

## 2021-08-18 PROCEDURE — 206N000001 HC R&B BMT UMMC

## 2021-08-18 PROCEDURE — 258N000003 HC RX IP 258 OP 636: Performed by: PHYSICIAN ASSISTANT

## 2021-08-18 PROCEDURE — 258N000003 HC RX IP 258 OP 636: Performed by: INTERNAL MEDICINE

## 2021-08-18 PROCEDURE — 250N000009 HC RX 250: Performed by: INTERNAL MEDICINE

## 2021-08-18 PROCEDURE — 80048 BASIC METABOLIC PNL TOTAL CA: CPT | Performed by: INTERNAL MEDICINE

## 2021-08-18 PROCEDURE — 250N000011 HC RX IP 250 OP 636: Performed by: INTERNAL MEDICINE

## 2021-08-18 PROCEDURE — 80197 ASSAY OF TACROLIMUS: CPT | Performed by: INTERNAL MEDICINE

## 2021-08-18 PROCEDURE — 84100 ASSAY OF PHOSPHORUS: CPT | Performed by: INTERNAL MEDICINE

## 2021-08-18 RX ORDER — MAGNESIUM SULFATE HEPTAHYDRATE 40 MG/ML
4 INJECTION, SOLUTION INTRAVENOUS ONCE
Status: COMPLETED | OUTPATIENT
Start: 2021-08-18 | End: 2021-08-18

## 2021-08-18 RX ADMIN — LEVOFLOXACIN 250 MG: 250 TABLET, FILM COATED ORAL at 11:40

## 2021-08-18 RX ADMIN — MYCOPHENOLATE MOFETIL 1500 MG: 500 INJECTION, POWDER, LYOPHILIZED, FOR SOLUTION INTRAVENOUS at 08:08

## 2021-08-18 RX ADMIN — URSODIOL 300 MG: 300 CAPSULE ORAL at 08:06

## 2021-08-18 RX ADMIN — URSODIOL 300 MG: 300 CAPSULE ORAL at 15:03

## 2021-08-18 RX ADMIN — LORATADINE 10 MG: 10 TABLET ORAL at 08:06

## 2021-08-18 RX ADMIN — ACYCLOVIR 800 MG: 800 TABLET ORAL at 15:03

## 2021-08-18 RX ADMIN — ACYCLOVIR 800 MG: 800 TABLET ORAL at 11:40

## 2021-08-18 RX ADMIN — FILGRASTIM 480 MCG: 480 INJECTION, SOLUTION INTRAVENOUS; SUBCUTANEOUS at 20:19

## 2021-08-18 RX ADMIN — MYCOPHENOLATE MOFETIL 1500 MG: 500 INJECTION, POWDER, LYOPHILIZED, FOR SOLUTION INTRAVENOUS at 20:36

## 2021-08-18 RX ADMIN — MAGNESIUM SULFATE IN WATER 4 G: 40 INJECTION, SOLUTION INTRAVENOUS at 06:56

## 2021-08-18 RX ADMIN — RIVAROXABAN 20 MG: 20 TABLET, FILM COATED ORAL at 18:25

## 2021-08-18 RX ADMIN — ACYCLOVIR 800 MG: 800 TABLET ORAL at 21:02

## 2021-08-18 RX ADMIN — ACYCLOVIR 800 MG: 800 TABLET ORAL at 18:24

## 2021-08-18 RX ADMIN — FLUCONAZOLE 200 MG: 200 TABLET ORAL at 08:05

## 2021-08-18 RX ADMIN — URSODIOL 300 MG: 300 CAPSULE ORAL at 20:19

## 2021-08-18 RX ADMIN — DEXTROSE MONOHYDRATE 10 ML: 50 INJECTION, SOLUTION INTRAVENOUS at 20:20

## 2021-08-18 RX ADMIN — LEVOTHYROXINE SODIUM 200 MCG: 0.05 TABLET ORAL at 08:05

## 2021-08-18 RX ADMIN — PANTOPRAZOLE SODIUM 40 MG: 40 TABLET, DELAYED RELEASE ORAL at 08:05

## 2021-08-18 RX ADMIN — LORAZEPAM 1 MG: 0.5 TABLET ORAL at 21:02

## 2021-08-18 RX ADMIN — Medication 5 MG: at 21:03

## 2021-08-18 RX ADMIN — TACROLIMUS 120 MCG/HR: 5 INJECTION, SOLUTION INTRAVENOUS at 20:19

## 2021-08-18 RX ADMIN — AMLODIPINE BESYLATE 5 MG: 5 TABLET ORAL at 08:05

## 2021-08-18 RX ADMIN — ACYCLOVIR 800 MG: 800 TABLET ORAL at 08:05

## 2021-08-18 RX ADMIN — DEXTROSE MONOHYDRATE 10 ML: 50 INJECTION, SOLUTION INTRAVENOUS at 20:19

## 2021-08-18 ASSESSMENT — ACTIVITIES OF DAILY LIVING (ADL)
ADLS_ACUITY_SCORE: 14

## 2021-08-18 ASSESSMENT — MIFFLIN-ST. JEOR: SCORE: 1872.89

## 2021-08-18 NOTE — PROGRESS NOTES
VS baseline, no pain was expressed, had fair oral intake, took shower and dressing nixon was done, no expressed pain or nausea and vomiting

## 2021-08-18 NOTE — PLAN OF CARE
"30-Second Sit to Stand Test:  The test is designed to be conducted with a straight back chair, without armrests, with a 17-inch seat height.  (Chair heights: High back & Folding = 18\", ICU/5C recliner & window seat = 18 1/2\"  Actual height of chair used: 18 \"    Patient Score (score =0 if must use arms) 16reps (previous score of 13)      The 30 Second Sit to Stand Test is considered a test of fall risk.  Data from [place] [facility]IKA, cosponsored by [place] [facility]:  If must use arms = High Fall Risk regardless of reps  8 or less times = High Fall Risk   9 to 12 times = Moderate Risk  13 or more times = Low Risk    The 30 Second Sit to Stand Test is also considered a test of leg strength and endurance.   Normative Data from Brandon et al,. 2001  Age                 Reps: Men        Reps: Women  60-64                14-19                       12-17                               65-69                12-18                       11-16                    70-74                12-17                       10-15              75-79                11-17                       10-15                    80-84                10-15                         9-14  85-89                 8-14                          8-13  90-94                 7-12                          4-11    Assessment (rationale for performing, application to patient s function & care plan): assess strength for functional transfers.   (Physical Therapist: Minutes billed as physical performance)    FACIT Fatigue score: 36  (Prior FACIT scores: 39)    A score of < 30 indicates below normal range   The FACIT-Fatigue scale assesses self-reported fatigue and its impact on daily activities and function during the past week.  Scores range 52-0, with lower scores indicating worse fatigue.    40 is the average score in general [place] population with a standard deviation of ~10.    Minimally Important Difference   3-4 points.    Source: Scoring & Interpretation Materials at " http://www.facit.org/FACITOrg/Questionnaires

## 2021-08-18 NOTE — PROGRESS NOTES
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  Walking the halls a lot; getting some new walking shoes ([employer]).     Review of Systems    Review of systems was negative other than noted above.     PHYSICAL EXAM      Weight     Wt Readings from Last 3 Encounters:   08/18/21 103.6 kg (228 lb 6.4 oz)   07/19/21 108 kg (238 lb)   07/13/21 108 kg (238 lb)          /73   Pulse 93   Temp 97.5  F (36.4  C) (Oral)   Resp 18   Ht 1.835 m (6' 0.24\")   Wt 103.6 kg (228 lb 6.4 oz)   SpO2 97%   BMI 30.77 kg/m       KPS: 90  General: NAD   Eyes: sclera anicteric   Lungs: breathing comfortably on RA; lungs clear  Heart: rrr, no m/r/g  Skin: No rashes or petechaie  Neuro: A&O, speech normal  EXT:  Compression socks in place  Vascular Access: port in the right chest, left double lumen blake not tender    Labs:  Lab Results   Component Value Date    WBC 1.1 (L) 08/18/2021    ANEU 0.6 (L) 08/18/2021    HGB 9.3 (L) 08/18/2021    HCT 27.6 (L) 08/18/2021    PLT 66 (L) 08/18/2021     08/18/2021    POTASSIUM 4.4 08/18/2021    CHLORIDE 109 08/18/2021    CO2 22 08/18/2021     (H) 08/18/2021    BUN 25 08/18/2021    CR 1.02 08/18/2021    MAG 1.5 (L) 08/18/2021    INR 1.32 (H) 08/16/2021         OVERALL ASSESSMENT AND PLAN   Nik Nava is a 61 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  Here for flu/cy/TBI and sib allo sct  HCT-CI score: 3 (prior solid tumor)  - GCSF started on day +5 today until ANC >2500 x 2; started daily claritin    2.  HEME:  - Pt/donor both Opos  - Transfuse for hgb <7g/dL; plt < " 10k  - prolonged INR, likely xarelto SE  - Recent pulmonary emboli: He developed a pulmonary emboli in the setting of receiving PEG asparaginase.  On xarelto. Will get last dose tonight and then stop due to thrombocytopenia.   - pancytopenia secondary to chemotherapy      3.  FEN/renal:  - lytes OK  - History of kidney cancer: He has a history of renal cancer and has a prior resection.  For that reason he has a single unilateral kidney.     4.  Dental clearance: Ok to proceed.    5.  GVHD: Tac/MMF   - tac level 9.7 8/16; no changes.      6.  ID:   - prophy acv/letermovir, fluconazole, levaquin   - sulfa allergic; pentamidine at day +28    9. Hx of graves disease; On synthroid    10. GI: Constipation. Resolved; stopped scheduled senna; miralax PRN.     11. CV: Hypertension. Likely secondary to tacrolimus. Started norvasc 5mg daily. Monitor.     Teressa Rick PA-C  8/18/2021

## 2021-08-18 NOTE — PLAN OF CARE
Neuro: A&Ox4.   Cardiac: VSS.   Respiratory: Sating WNL on RA.  GI/: Adequate urine output. No BM. Denies nausea.  Diet/appetite: High kcal/ high protein diet ordered.  Activity:  Up in  room independently.  Pain: Denies  Skin: Intact  LDA's: DL internal jugular infusing Tacrolimus at 120 mcg/hr.    Plan: Continue with POC. Notify primary team with changes.

## 2021-08-18 NOTE — PLAN OF CARE
PT 5C: Defer - Following discussion with OT, pt progressing well and maintaining IND, so does not require IP PT. OT to continue to treat pt for conditioning and will request re-consult with change in status. Will complete PT orders. Thank you for your referral.

## 2021-08-18 NOTE — PLAN OF CARE
A febrile,vitals stable,denied nausea,appetite fair.Ambulated frequently in the jerry independently.Thigh pain rated 1-2,said pain is tolerable,declined any intervention.Patient declined to take chlorhexidine bath tonight,pt was  encouraged but pt said he is too tired and would do bath early in the morning.Tacrolimus gtt running at 120 mcg/hr.Continue per plan of care.

## 2021-08-19 ENCOUNTER — APPOINTMENT (OUTPATIENT)
Dept: OCCUPATIONAL THERAPY | Facility: CLINIC | Age: 61
End: 2021-08-19
Attending: INTERNAL MEDICINE
Payer: COMMERCIAL

## 2021-08-19 LAB
ALBUMIN SERPL-MCNC: 3.2 G/DL (ref 3.4–5)
ALP SERPL-CCNC: 57 U/L (ref 40–150)
ALT SERPL W P-5'-P-CCNC: 17 U/L (ref 0–70)
ANION GAP SERPL CALCULATED.3IONS-SCNC: 5 MMOL/L (ref 3–14)
AST SERPL W P-5'-P-CCNC: 11 U/L (ref 0–45)
BACTERIA SPEC CULT: NORMAL
BASOPHILS # BLD MANUAL: 0 10E3/UL (ref 0–0.2)
BASOPHILS NFR BLD MANUAL: 0 %
BILIRUB SERPL-MCNC: 0.4 MG/DL (ref 0.2–1.3)
BUN SERPL-MCNC: 30 MG/DL (ref 7–30)
CALCIUM SERPL-MCNC: 8.6 MG/DL (ref 8.5–10.1)
CHLORIDE BLD-SCNC: 110 MMOL/L (ref 94–109)
CO2 SERPL-SCNC: 24 MMOL/L (ref 20–32)
CREAT SERPL-MCNC: 1.13 MG/DL (ref 0.66–1.25)
DACRYOCYTES BLD QL SMEAR: SLIGHT
EOSINOPHIL # BLD MANUAL: 0 10E3/UL (ref 0–0.7)
EOSINOPHIL NFR BLD MANUAL: 0 %
ERYTHROCYTE [DISTWIDTH] IN BLOOD BY AUTOMATED COUNT: 14.2 % (ref 10–15)
GFR SERPL CREATININE-BSD FRML MDRD: 69 ML/MIN/1.73M2
GLUCOSE BLD-MCNC: 101 MG/DL (ref 70–99)
HCT VFR BLD AUTO: 25.6 % (ref 40–53)
HGB BLD-MCNC: 8.7 G/DL (ref 13.3–17.7)
LYMPHOCYTES # BLD MANUAL: 0.2 10E3/UL (ref 0.8–5.3)
LYMPHOCYTES NFR BLD MANUAL: 11 %
MAGNESIUM SERPL-MCNC: 1.8 MG/DL (ref 1.6–2.3)
MCH RBC QN AUTO: 33.5 PG (ref 26.5–33)
MCHC RBC AUTO-ENTMCNC: 34 G/DL (ref 31.5–36.5)
MCV RBC AUTO: 99 FL (ref 78–100)
MONOCYTES # BLD MANUAL: 0.2 10E3/UL (ref 0–1.3)
MONOCYTES NFR BLD MANUAL: 10 %
NEUTROPHILS # BLD MANUAL: 1.7 10E3/UL (ref 1.6–8.3)
NEUTROPHILS NFR BLD MANUAL: 79 %
PHOSPHATE SERPL-MCNC: 4.3 MG/DL (ref 2.5–4.5)
PLAT MORPH BLD: ABNORMAL
PLATELET # BLD AUTO: 62 10E3/UL (ref 150–450)
POTASSIUM BLD-SCNC: 4.3 MMOL/L (ref 3.4–5.3)
PROT SERPL-MCNC: 6.4 G/DL (ref 6.8–8.8)
RBC # BLD AUTO: 2.6 10E6/UL (ref 4.4–5.9)
RBC MORPH BLD: ABNORMAL
SODIUM SERPL-SCNC: 139 MMOL/L (ref 133–144)
WBC # BLD AUTO: 2.2 10E3/UL (ref 4–11)

## 2021-08-19 PROCEDURE — 250N000012 HC RX MED GY IP 250 OP 636 PS 637: Performed by: PHYSICIAN ASSISTANT

## 2021-08-19 PROCEDURE — 250N000011 HC RX IP 250 OP 636: Performed by: INTERNAL MEDICINE

## 2021-08-19 PROCEDURE — 85027 COMPLETE CBC AUTOMATED: CPT | Performed by: INTERNAL MEDICINE

## 2021-08-19 PROCEDURE — 250N000013 HC RX MED GY IP 250 OP 250 PS 637: Performed by: INTERNAL MEDICINE

## 2021-08-19 PROCEDURE — 83735 ASSAY OF MAGNESIUM: CPT | Performed by: INTERNAL MEDICINE

## 2021-08-19 PROCEDURE — 99233 SBSQ HOSP IP/OBS HIGH 50: CPT | Performed by: INTERNAL MEDICINE

## 2021-08-19 PROCEDURE — 250N000013 HC RX MED GY IP 250 OP 250 PS 637: Performed by: NURSE PRACTITIONER

## 2021-08-19 PROCEDURE — 250N000013 HC RX MED GY IP 250 OP 250 PS 637: Performed by: PHYSICIAN ASSISTANT

## 2021-08-19 PROCEDURE — 84100 ASSAY OF PHOSPHORUS: CPT | Performed by: INTERNAL MEDICINE

## 2021-08-19 PROCEDURE — 258N000003 HC RX IP 258 OP 636: Performed by: INTERNAL MEDICINE

## 2021-08-19 PROCEDURE — 80053 COMPREHEN METABOLIC PANEL: CPT | Performed by: INTERNAL MEDICINE

## 2021-08-19 PROCEDURE — 97110 THERAPEUTIC EXERCISES: CPT | Mod: GO

## 2021-08-19 PROCEDURE — 250N000009 HC RX 250: Performed by: INTERNAL MEDICINE

## 2021-08-19 PROCEDURE — 206N000001 HC R&B BMT UMMC

## 2021-08-19 RX ORDER — MYCOPHENOLATE MOFETIL 500 MG/1
1500 TABLET ORAL
Status: DISCONTINUED | OUTPATIENT
Start: 2021-08-19 | End: 2021-08-22 | Stop reason: HOSPADM

## 2021-08-19 RX ADMIN — ACYCLOVIR 800 MG: 800 TABLET ORAL at 15:49

## 2021-08-19 RX ADMIN — FILGRASTIM 480 MCG: 480 INJECTION, SOLUTION INTRAVENOUS; SUBCUTANEOUS at 19:47

## 2021-08-19 RX ADMIN — LORAZEPAM 1 MG: 0.5 TABLET ORAL at 21:28

## 2021-08-19 RX ADMIN — LEVOFLOXACIN 250 MG: 250 TABLET, FILM COATED ORAL at 11:20

## 2021-08-19 RX ADMIN — TACROLIMUS 3.5 MG: 1 CAPSULE ORAL at 19:46

## 2021-08-19 RX ADMIN — ACYCLOVIR 800 MG: 800 TABLET ORAL at 08:09

## 2021-08-19 RX ADMIN — ACYCLOVIR 800 MG: 800 TABLET ORAL at 21:27

## 2021-08-19 RX ADMIN — PANTOPRAZOLE SODIUM 40 MG: 40 TABLET, DELAYED RELEASE ORAL at 08:10

## 2021-08-19 RX ADMIN — SODIUM CHLORIDE, PRESERVATIVE FREE 10 ML: 5 INJECTION INTRAVENOUS at 11:20

## 2021-08-19 RX ADMIN — URSODIOL 300 MG: 300 CAPSULE ORAL at 15:49

## 2021-08-19 RX ADMIN — ACYCLOVIR 800 MG: 800 TABLET ORAL at 18:36

## 2021-08-19 RX ADMIN — RIVAROXABAN 20 MG: 20 TABLET, FILM COATED ORAL at 18:36

## 2021-08-19 RX ADMIN — MYCOPHENOLATE MOFETIL 1500 MG: 500 TABLET, FILM COATED ORAL at 19:46

## 2021-08-19 RX ADMIN — URSODIOL 300 MG: 300 CAPSULE ORAL at 19:46

## 2021-08-19 RX ADMIN — DEXTROSE MONOHYDRATE 20 ML: 50 INJECTION, SOLUTION INTRAVENOUS at 19:52

## 2021-08-19 RX ADMIN — TACROLIMUS 3.5 MG: 1 CAPSULE ORAL at 11:19

## 2021-08-19 RX ADMIN — ACYCLOVIR 800 MG: 800 TABLET ORAL at 11:19

## 2021-08-19 RX ADMIN — Medication 5 MG: at 21:28

## 2021-08-19 RX ADMIN — LEVOTHYROXINE SODIUM 200 MCG: 0.05 TABLET ORAL at 08:10

## 2021-08-19 RX ADMIN — DEXTROSE MONOHYDRATE 20 ML: 50 INJECTION, SOLUTION INTRAVENOUS at 19:47

## 2021-08-19 RX ADMIN — LORATADINE 10 MG: 10 TABLET ORAL at 08:10

## 2021-08-19 RX ADMIN — URSODIOL 300 MG: 300 CAPSULE ORAL at 08:10

## 2021-08-19 RX ADMIN — FLUCONAZOLE 200 MG: 200 TABLET ORAL at 08:10

## 2021-08-19 RX ADMIN — ACETAMINOPHEN 650 MG: 325 TABLET, FILM COATED ORAL at 06:54

## 2021-08-19 RX ADMIN — MYCOPHENOLATE MOFETIL 1500 MG: 500 INJECTION, POWDER, LYOPHILIZED, FOR SOLUTION INTRAVENOUS at 08:10

## 2021-08-19 RX ADMIN — AMLODIPINE BESYLATE 5 MG: 5 TABLET ORAL at 08:10

## 2021-08-19 ASSESSMENT — ACTIVITIES OF DAILY LIVING (ADL)
ADLS_ACUITY_SCORE: 14

## 2021-08-19 ASSESSMENT — MIFFLIN-ST. JEOR: SCORE: 1874.7

## 2021-08-19 NOTE — PROGRESS NOTES
VS baseline, no pain was expressed during day time, status improved, Iv medication was changed to oral and patient took without problem, had fair food intake, this weekend plan for discharge

## 2021-08-19 NOTE — PROGRESS NOTES
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 Pt shared that he is feeling well. Pt was informing his wife that he was encouraged to begin preparation for discharge with his caregivers. Pt's wife states she is \"pleasantly surprised\" that Pt is doing well \"so soon\" though she is encouraged by this. Pt's wife had multiple nursing questions re: discharge planning. CSW notified Pt's RNCC with request to contact wife w/ questions. CSW provided empathic listening, validation of concerns, and encouragement. CSW provided discharge packet with psychosocial resources for patient and caregiver. Pt's primary caregiver will be his wife Lamar with his sister Jenise & dtr Lisa as a secondary or back-up to assist as needed. CSW encouraged Pt to contact CSW for support, questions and/or resources.     Assessment: Pt presented as pleasant, calm, and engaged.  Pt appears to be coping appropriately at this time. Pt continues to be supported by his wife Lamar.     Plan: CSW will continue to provide supportive counseling and assistance with resources as needed. CSW will continue to collaborate with multidisciplinary team regarding Pt's plan of care.     MATEO Garcia, [facility]  Adult Blood & Marrow Transplant   Phone: [phone]  Pager: [phone]    "

## 2021-08-19 NOTE — PROGRESS NOTES
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 No major medical complaints. Discussed that he could likely discharge on Sunday pending continued count recovery and tolerates PO meds.     Review of Systems    Review of systems was negative other than noted above.     PHYSICAL EXAM      Weight     Wt Readings from Last 3 Encounters:   08/19/21 103.8 kg (228 lb 12.8 oz)   07/19/21 108 kg (238 lb)   07/13/21 108 kg (238 lb)          /79 (BP Location: Left arm)   Pulse 73   Temp 97.9  F (36.6  C) (Oral)   Resp 18   Ht 1.835 m (6' 0.24\")   Wt 103.8 kg (228 lb 12.8 oz)   SpO2 95%   BMI 30.82 kg/m       KPS: 90  General: NAD   Eyes: sclera anicteric   Lungs: breathing comfortably on RA; lungs clear  Heart: rrr, no m/r/g  Skin: No rashes or petechaie  Neuro: A&O, speech normal  EXT:  Compression socks in place  Vascular Access: port in the right chest, left double lumen blake not tender    Labs:  Lab Results   Component Value Date    WBC 2.2 (L) 08/19/2021    ANEU 1.7 08/19/2021    HGB 8.7 (L) 08/19/2021    HCT 25.6 (L) 08/19/2021    PLT 62 (L) 08/19/2021     08/19/2021    POTASSIUM 4.3 08/19/2021    CHLORIDE 110 (H) 08/19/2021    CO2 24 08/19/2021     (H) 08/19/2021    BUN 30 08/19/2021    CR 1.13 08/19/2021    MAG 1.8 08/19/2021    INR 1.32 (H) 08/16/2021         OVERALL ASSESSMENT AND PLAN   Nik Nava is a 61 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  Here for flu/cy/TBI and sib allo sct  HCT-CI score: 3 (prior solid tumor)  - GCSF started on day +5 until ANC >2500 x 2; daily claritin.  - Since we are talking " discharge Sunday I asked Nik about day +21 BM BX he would like under sedation. RN coordinator will get scheduled at [facility].     2.  HEME:  - Pt/donor both Opos  - Transfuse for hgb <7g/dL; plt < 10k  - prolonged INR, likely xarelto SE  - Recent pulmonary emboli: He developed a pulmonary emboli in the setting of receiving PEG asparaginase. On xarelto will continue until plts <50K.  - pancytopenia secondary to chemotherapy. ANC up to 1700 today.       3.  FEN/renal:  - lytes OK  - History of kidney cancer: He has a history of renal cancer and has a prior resection.  For that reason he has a single unilateral kidney.     4.  Dental clearance: Ok to proceed.    5.  GVHD: Tac/MMF   - tac level 12.6 8/18. Will change tac and MMF to PO today. Tac level ordered for Saturday.      6.  ID:   - prophy acv/letermovir, fluconazole, levaquin   - sulfa allergic; pentamidine at day +28    9. Hx of graves disease; On synthroid    10. GI: Constipation. Resolved; stopped scheduled senna; miralax PRN.     11. CV: Hypertension. Likely secondary to tacrolimus. On norvasc 5mg.    Dispositon: pending continued count recovery, tolerating all meds PO, and clinical stability will discharge Sunday. RN coordinator will setup discharge teaching and will see if can get line class on Saturday otherwise it will be done in clinic next week.     Mayra Cosby PA-C  [phone]

## 2021-08-19 NOTE — PLAN OF CARE
AVSS. Up independent in room. Denied pain, N/V. Appetitie and oral intake are fair. No issues with voiding. Melatonin and ativan given for sleep. No replacements needed overnight. Continue plan of care.     Pt complained of some aching in his hips, PRN tylenol given x 1.     Problem: Adult Inpatient Plan of Care  Goal: Plan of Care Review  Outcome: No Change  Goal: Patient-Specific Goal (Individualized)  Outcome: No Change  Goal: Absence of Hospital-Acquired Illness or Injury  Outcome: No Change  Intervention: Identify and Manage Fall Risk  Recent Flowsheet Documentation  Taken 8/18/2021 2000 by Lashanda De Luna, RN  Safety Promotion/Fall Prevention: assistive device/personal items within reach  Intervention: Prevent Skin Injury  Recent Flowsheet Documentation  Taken 8/18/2021 2000 by Lashanda De Luna RN  Body Position: position changed independently  Intervention: Prevent and Manage VTE (Venous Thromboembolism) Risk  Recent Flowsheet Documentation  Taken 8/18/2021 2000 by Lashanda De Luna RN  VTE Prevention/Management:    ambulation promoted    fluids promoted     Problem: Adjustment to Transplant (Stem Cell/Bone Marrow Transplant)  Goal: Optimal Coping with Transplant  Outcome: No Change     Problem: Bladder Irritation (Stem Cell/Bone Marrow Transplant)  Goal: Symptom-Free Urinary Elimination  Outcome: No Change     Problem: Diarrhea (Stem Cell/Bone Marrow Transplant)  Goal: Diarrhea Symptom Control  Outcome: No Change     Problem: Fatigue (Stem Cell/Bone Marrow Transplant)  Goal: Energy Level Supports Daily Activity  Outcome: No Change     Problem: Hypersensitivity Reaction (Stem Cell/Bone Marrow Transplant)  Goal: Absence of Hypersensitivity Reaction  Outcome: No Change     Problem: Infection Risk (Stem Cell/Bone Marrow Transplant)  Goal: Absence of Infection  Outcome: No Change     Problem: Mucositis (Stem Cell/Bone Marrow Transplant)  Goal: Mucous Membrane Health and Integrity  Outcome: No  Change  Intervention: Maintain Oral Mucous Membrane Integrity  Recent Flowsheet Documentation  Taken 8/18/2021 2000 by Lashanda De Luna, RN  Oral Care: oral rinse provided     Problem: Nausea and Vomiting (Stem Cell/Bone Marrow Transplant)  Goal: Nausea and Vomiting Symptom Relief  Outcome: No Change     Problem: Nutrition Intake Altered (Stem Cell/Bone Marrow Transplant)  Goal: Optimal Nutrition Intake  Outcome: No Change     Problem: Discharge Planning  Goal: Discharge Planning (Adult, OB, Behavioral, Peds)  Outcome: No Change

## 2021-08-20 LAB
ANION GAP SERPL CALCULATED.3IONS-SCNC: 4 MMOL/L (ref 3–14)
BASOPHILS # BLD MANUAL: 0 10E3/UL (ref 0–0.2)
BASOPHILS NFR BLD MANUAL: 0 %
BUN SERPL-MCNC: 24 MG/DL (ref 7–30)
CALCIUM SERPL-MCNC: 8.8 MG/DL (ref 8.5–10.1)
CHLORIDE BLD-SCNC: 111 MMOL/L (ref 94–109)
CO2 SERPL-SCNC: 25 MMOL/L (ref 20–32)
CREAT SERPL-MCNC: 1.07 MG/DL (ref 0.66–1.25)
EOSINOPHIL # BLD MANUAL: 0 10E3/UL (ref 0–0.7)
EOSINOPHIL NFR BLD MANUAL: 0 %
ERYTHROCYTE [DISTWIDTH] IN BLOOD BY AUTOMATED COUNT: 14.4 % (ref 10–15)
GFR SERPL CREATININE-BSD FRML MDRD: 73 ML/MIN/1.73M2
GLUCOSE BLD-MCNC: 99 MG/DL (ref 70–99)
HCT VFR BLD AUTO: 26 % (ref 40–53)
HGB BLD-MCNC: 8.6 G/DL (ref 13.3–17.7)
LYMPHOCYTES # BLD MANUAL: 0.4 10E3/UL (ref 0.8–5.3)
LYMPHOCYTES NFR BLD MANUAL: 13 %
MAGNESIUM SERPL-MCNC: 1.5 MG/DL (ref 1.6–2.3)
MCH RBC QN AUTO: 33.2 PG (ref 26.5–33)
MCHC RBC AUTO-ENTMCNC: 33.1 G/DL (ref 31.5–36.5)
MCV RBC AUTO: 100 FL (ref 78–100)
MONOCYTES # BLD MANUAL: 0.2 10E3/UL (ref 0–1.3)
MONOCYTES NFR BLD MANUAL: 6 %
NEUTROPHILS # BLD MANUAL: 2.4 10E3/UL (ref 1.6–8.3)
NEUTROPHILS NFR BLD MANUAL: 81 %
PHOSPHATE SERPL-MCNC: 3.7 MG/DL (ref 2.5–4.5)
PLAT MORPH BLD: ABNORMAL
PLATELET # BLD AUTO: 60 10E3/UL (ref 150–450)
POTASSIUM BLD-SCNC: 4.4 MMOL/L (ref 3.4–5.3)
RBC # BLD AUTO: 2.59 10E6/UL (ref 4.4–5.9)
RBC MORPH BLD: ABNORMAL
SODIUM SERPL-SCNC: 140 MMOL/L (ref 133–144)
WBC # BLD AUTO: 3 10E3/UL (ref 4–11)

## 2021-08-20 PROCEDURE — 250N000013 HC RX MED GY IP 250 OP 250 PS 637: Performed by: INTERNAL MEDICINE

## 2021-08-20 PROCEDURE — 250N000012 HC RX MED GY IP 250 OP 636 PS 637: Performed by: PHYSICIAN ASSISTANT

## 2021-08-20 PROCEDURE — 84100 ASSAY OF PHOSPHORUS: CPT | Performed by: PHYSICIAN ASSISTANT

## 2021-08-20 PROCEDURE — 250N000011 HC RX IP 250 OP 636: Performed by: INTERNAL MEDICINE

## 2021-08-20 PROCEDURE — 83735 ASSAY OF MAGNESIUM: CPT | Performed by: INTERNAL MEDICINE

## 2021-08-20 PROCEDURE — 258N000003 HC RX IP 258 OP 636: Performed by: INTERNAL MEDICINE

## 2021-08-20 PROCEDURE — 250N000013 HC RX MED GY IP 250 OP 250 PS 637: Performed by: PHYSICIAN ASSISTANT

## 2021-08-20 PROCEDURE — 85027 COMPLETE CBC AUTOMATED: CPT | Performed by: INTERNAL MEDICINE

## 2021-08-20 PROCEDURE — 80048 BASIC METABOLIC PNL TOTAL CA: CPT | Performed by: INTERNAL MEDICINE

## 2021-08-20 PROCEDURE — 250N000013 HC RX MED GY IP 250 OP 250 PS 637: Performed by: NURSE PRACTITIONER

## 2021-08-20 PROCEDURE — 206N000001 HC R&B BMT UMMC

## 2021-08-20 RX ORDER — HEPARIN SODIUM,PORCINE 10 UNIT/ML
5 VIAL (ML) INTRAVENOUS
Status: CANCELLED | OUTPATIENT
Start: 2021-08-23

## 2021-08-20 RX ORDER — ACYCLOVIR 800 MG/1
TABLET ORAL
Qty: 69 TABLET | Refills: 1 | Status: SHIPPED | OUTPATIENT
Start: 2021-08-20 | End: 2021-08-25

## 2021-08-20 RX ORDER — HEPARIN SODIUM (PORCINE) LOCK FLUSH IV SOLN 100 UNIT/ML 100 UNIT/ML
5 SOLUTION INTRAVENOUS
Status: CANCELLED | OUTPATIENT
Start: 2021-08-23

## 2021-08-20 RX ORDER — AMLODIPINE BESYLATE 5 MG/1
5 TABLET ORAL DAILY
Qty: 30 TABLET | Refills: 1 | Status: SHIPPED | OUTPATIENT
Start: 2021-08-21 | End: 2021-08-24

## 2021-08-20 RX ORDER — MYCOPHENOLATE MOFETIL 500 MG/1
1500 TABLET ORAL EVERY 12 HOURS
Qty: 72 TABLET | Refills: 0 | Status: SHIPPED | OUTPATIENT
Start: 2021-08-20 | End: 2021-09-02

## 2021-08-20 RX ORDER — TACROLIMUS 1 MG/1
3 CAPSULE ORAL 2 TIMES DAILY
Qty: 180 CAPSULE | Refills: 1 | Status: SHIPPED | OUTPATIENT
Start: 2021-08-20 | End: 2021-08-25

## 2021-08-20 RX ORDER — URSODIOL 300 MG/1
300 CAPSULE ORAL 3 TIMES DAILY
Qty: 90 CAPSULE | Refills: 1 | Status: SHIPPED | OUTPATIENT
Start: 2021-08-20 | End: 2021-09-17

## 2021-08-20 RX ORDER — LEVOFLOXACIN 250 MG/1
250 TABLET, FILM COATED ORAL DAILY
Qty: 14 TABLET | Refills: 0 | Status: SHIPPED | OUTPATIENT
Start: 2021-08-20 | End: 2021-08-25

## 2021-08-20 RX ORDER — PROCHLORPERAZINE MALEATE 10 MG
5 TABLET ORAL EVERY 6 HOURS PRN
COMMUNITY
Start: 2021-08-20 | End: 2021-10-19

## 2021-08-20 RX ORDER — TACROLIMUS 0.5 MG/1
0.5 CAPSULE ORAL EVERY 12 HOURS
Qty: 60 CAPSULE | Refills: 1 | Status: SHIPPED | OUTPATIENT
Start: 2021-08-20 | End: 2021-08-25

## 2021-08-20 RX ORDER — FLUCONAZOLE 200 MG/1
200 TABLET ORAL DAILY
Qty: 30 TABLET | Refills: 1 | Status: SHIPPED | OUTPATIENT
Start: 2021-08-21 | End: 2021-09-04

## 2021-08-20 RX ORDER — MAGNESIUM SULFATE HEPTAHYDRATE 40 MG/ML
4 INJECTION, SOLUTION INTRAVENOUS ONCE
Status: COMPLETED | OUTPATIENT
Start: 2021-08-20 | End: 2021-08-20

## 2021-08-20 RX ORDER — LORATADINE 10 MG/1
10 TABLET ORAL DAILY
Qty: 10 TABLET | Refills: 0 | Status: SHIPPED | OUTPATIENT
Start: 2021-08-21 | End: 2021-08-25

## 2021-08-20 RX ADMIN — AMLODIPINE BESYLATE 5 MG: 5 TABLET ORAL at 08:20

## 2021-08-20 RX ADMIN — RIVAROXABAN 20 MG: 20 TABLET, FILM COATED ORAL at 18:23

## 2021-08-20 RX ADMIN — MYCOPHENOLATE MOFETIL 1500 MG: 500 TABLET, FILM COATED ORAL at 08:20

## 2021-08-20 RX ADMIN — ACYCLOVIR 800 MG: 800 TABLET ORAL at 08:20

## 2021-08-20 RX ADMIN — LORATADINE 10 MG: 10 TABLET ORAL at 08:20

## 2021-08-20 RX ADMIN — ACETAMINOPHEN 325 MG: 325 TABLET, FILM COATED ORAL at 07:03

## 2021-08-20 RX ADMIN — URSODIOL 300 MG: 300 CAPSULE ORAL at 19:55

## 2021-08-20 RX ADMIN — TACROLIMUS 3.5 MG: 1 CAPSULE ORAL at 19:55

## 2021-08-20 RX ADMIN — PANTOPRAZOLE SODIUM 40 MG: 40 TABLET, DELAYED RELEASE ORAL at 08:20

## 2021-08-20 RX ADMIN — LEVOTHYROXINE SODIUM 200 MCG: 0.05 TABLET ORAL at 08:19

## 2021-08-20 RX ADMIN — LEVOFLOXACIN 250 MG: 250 TABLET, FILM COATED ORAL at 10:22

## 2021-08-20 RX ADMIN — SODIUM CHLORIDE, PRESERVATIVE FREE 10 ML: 5 INJECTION INTRAVENOUS at 11:43

## 2021-08-20 RX ADMIN — URSODIOL 300 MG: 300 CAPSULE ORAL at 08:20

## 2021-08-20 RX ADMIN — DEXTROSE MONOHYDRATE 20 ML: 50 INJECTION, SOLUTION INTRAVENOUS at 19:56

## 2021-08-20 RX ADMIN — FILGRASTIM 480 MCG: 480 INJECTION, SOLUTION INTRAVENOUS; SUBCUTANEOUS at 19:56

## 2021-08-20 RX ADMIN — Medication 5 MG: at 21:33

## 2021-08-20 RX ADMIN — URSODIOL 300 MG: 300 CAPSULE ORAL at 14:23

## 2021-08-20 RX ADMIN — LORAZEPAM 1 MG: 0.5 TABLET ORAL at 21:33

## 2021-08-20 RX ADMIN — MYCOPHENOLATE MOFETIL 1500 MG: 500 TABLET, FILM COATED ORAL at 19:55

## 2021-08-20 RX ADMIN — ACYCLOVIR 800 MG: 800 TABLET ORAL at 11:39

## 2021-08-20 RX ADMIN — FLUCONAZOLE 200 MG: 200 TABLET ORAL at 08:20

## 2021-08-20 RX ADMIN — ACYCLOVIR 800 MG: 800 TABLET ORAL at 14:23

## 2021-08-20 RX ADMIN — MAGNESIUM SULFATE IN WATER 4 G: 40 INJECTION, SOLUTION INTRAVENOUS at 05:01

## 2021-08-20 RX ADMIN — ACYCLOVIR 800 MG: 800 TABLET ORAL at 21:33

## 2021-08-20 RX ADMIN — ACYCLOVIR 800 MG: 800 TABLET ORAL at 18:23

## 2021-08-20 RX ADMIN — TACROLIMUS 3.5 MG: 1 CAPSULE ORAL at 08:20

## 2021-08-20 ASSESSMENT — ACTIVITIES OF DAILY LIVING (ADL)
ADLS_ACUITY_SCORE: 14

## 2021-08-20 ASSESSMENT — MIFFLIN-ST. JEOR: SCORE: 1886.04

## 2021-08-20 NOTE — PROGRESS NOTES
Care Management Discharge Note    Discharge Date: 08/22/2021     Discharge Disposition: Home    Discharge Services/DME:  [facility] [phone] for line care supplies.              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 SW met with pt and pt's spouse on 8/19/21 to assess coping, provide psychosocial support and provide discharge packet with post-transplant resources for patient and caregiver. SW encouraged pt to contact SW for support, questions and/or resources.     Education Provided: Discharge Resource Packet, Caregiver Self-Care Education, and Expected Emotional Responses to Transition Home.    Plan: Pt to discharge Home ([place], [place]) with his wife (Lamar) as primary caregiver. SW will continue to provide psychosocial support and assistance with resources as needed. SW will continue to collaborate with multidisciplinary team regarding pt's plan of care.     MATEO Garcia, [facility]  Adult Blood & Marrow Transplant   Phone: [phone]  Pager: [phone]          MATEO Shi

## 2021-08-20 NOTE — PLAN OF CARE
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    Teaching Topic: Allogeneic stem cell transplant discharge teaching    Person(s) involved in teaching: Patient and Spouse  Motivation Level  Asks Questions: Yes  Eager to Learn: Yes  Cooperative: Yes  Receptive (willing/able to accept information): Yes  Any cultural factors/Adventist beliefs that may influence understanding or compliance? No    Patient and Caregiver demonstrates understanding of the following:  - Reason for the appointment, diagnosis and treatment plan: Yes  - Knowledge of proper use of medications and conditions for which they are ordered (with special attention to potential side effects or drug interactions): Yes  - Which situations necessitate calling provider and whom to contact: Yes    Teaching concerns addressed: none identified     Proper use and care of (medical equipment, care aids, etc.) Yes  Pain management techniques: Yes  Patient instructed on hand hygiene: Yes  How and/when to access community resources: Yes    Infection Control:  Patient and Caregiver demonstrates understanding of the following:  Surgical procedure site care taught NA  Signs and symptoms of infection taught Yes  Wound care taught NA  Central venous catheter care taught Yes - PLC will call wife Lamar to set up time for class in the clinic    Instructional Materials Used/Given: Discharge teaching completed with patient and family. Written guidelines provided including clinic follow up, signs and symptoms to report, infection prevention, and contact list. Reviewed potential side effects s/p transplant and what to expect during recovery period. Reviewed symptoms and treatment of GVHD. Discussed clinic routines. Discussed activities to avoid to prevent infections and mask guidelines. [facility] for line care supplies. Pt and family verbalize understanding of material  via teach back and all questions have been answered.    Research participant Nik Nava was provided the information on the Research Participant Information Sheet by Claudia Garzon RN on August 20, 2021. This document contains information regarding the risks of participating in a research study during the COVID-19 pandemic. Receipt of the information sheet was confirmed by study personnel prior to the visit.    Time spent with patient: 45 minutes.      Specific Concerns: SHELDON Garzon RN BSN  RN Care Coordinator - Inpatient BMT Unit 5C  Phone [phone]  Pager [phone]

## 2021-08-20 NOTE — PROGRESS NOTES
Care Coordination  Discharge Planning    Line Company:  [email or web address] [phone] for line care supplies.    **Patient requests Aquaguard shower covers for IV - GIRISH to please note this in line care orders (Summary of Care).   Referral made for line care:  Yes    IV medications needed at discharge:  None   Pharmacy concerns: Voriconazole:  Prior authorization required      Prevymis/letermovir: $15 copay    PT/OT/therapies recommended: No   Referral made for PT/OT/therapies:  No    D/c location: Home  Has placement need been communicated to Social Work?  Yes    NC Teaching time arranged with patient/caregiver: Completed 8/20/21 with patient and wife.     Notify nurse to schedule line care class/ DM teaching, prior to d/c: Called patient learning center to let them know that patient is discharging home on Sunday so will not be inpatient for the original teach time on 8/24. PLC will call patient's wife today, 8/20, to set up new time.     Caregiver: Lamar (wife): [phone]    Claudia Garzon RN BSN  RN Care Coordinator - Inpatient BMT Unit 5C  Phone [phone]  Pager [phone]

## 2021-08-20 NOTE — SUMMARY OF CARE
Nik Nava   Home Medication Instructions ANIL:[id number]    Printed on:08/20/21 7888   Medication Information                      acetaminophen (TYLENOL) 500 MG tablet  Take 500-1,000 mg by mouth every 6 hours as needed for mild pain              acyclovir (ZOVIRAX) 800 MG tablet  800mg 5x/day on 8/22, 8/23 & 8/24    Starting on 8/25 take 800mg twice a day             amLODIPine (NORVASC) 5 MG tablet  Take 1 tablet (5 mg) by mouth daily             fluconazole (DIFLUCAN) 200 MG tablet  Take 1 tablet (200 mg) by mouth daily             letermovir (PREVYMIS) 480 MG TABS tablet  Take 1 tablet (480 mg) by mouth daily Start on 8/24             levofloxacin (LEVAQUIN) 250 MG tablet  Take 1 tablet (250 mg) by mouth daily             levothyroxine (SYNTHROID/LEVOTHROID) 200 MCG tablet  Take 200 mcg by mouth daily              loratadine (CLARITIN) 10 MG tablet  Take 1 tablet (10 mg) by mouth daily             LORazepam (ATIVAN) 1 MG tablet  Take 1 mg by mouth nightly as needed for anxiety or sleep              melatonin 5 MG CAPS  Take 5 mg by mouth At Bedtime              mycophenolate (GENERIC EQUIVALENT) 500 MG tablet  Take 3 tablets (1,500 mg) by mouth every 12 hours             nicotine polacrilex (NICORETTE) 4 MG gum  Take 4 mg by mouth as needed for smoking cessation              Nutritional Supplements (ENSURE HIGH PROTEIN)  Take 1 Can by mouth 2 times daily              omeprazole (PRILOSEC) 40 MG DR capsule  Take 40 mg by mouth daily             ondansetron (ZOFRAN) 8 MG tablet  ondansetron HCl 8 mg tablet             polyethylene glycol (MIRALAX) 17 GM/Dose powder  Take 17 g by mouth daily as needed for constipation              prochlorperazine (COMPAZINE) 10 MG tablet  Take 0.5 tablets (5 mg) by mouth every 6 hours as needed for nausea or vomiting             senna (SENOKOT) 8.6 MG tablet  Take 17.2 mg by mouth              tacrolimus (GENERIC EQUIVALENT) 0.5 MG capsule  Do not take for now but can use  when dose changed             tacrolimus (GENERIC EQUIVALENT) 1 MG capsule  Take 4 capsules (4 mg) by mouth 2 times every 12 hours             ursodiol (ACTIGALL) 300 MG capsule  Take 1 capsule (300 mg) by mouth 3 times daily             XARELTO ANTICOAGULANT 20 MG TABS tablet  Take 20 mg by mouth daily

## 2021-08-20 NOTE — PROGRESS NOTES
"   ID:  Nik Nava is a 61 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, here for flu/cy/TBI and sib allo stem cell transplant; today is day +10    Interval History:   Overall doing well. No major medical complaints.  Tolerating pills well.  Eating really well. Remains active.      Review of Systems    Review of systems was negative other than noted above.     PHYSICAL EXAM      Weight     Wt Readings from Last 3 Encounters:   08/19/21 103.8 kg (228 lb 12.8 oz)   07/19/21 108 kg (238 lb)   07/13/21 108 kg (238 lb)          /76 (BP Location: Left arm)   Pulse 91   Temp 97.9  F (36.6  C) (Oral)   Resp 18   Ht 1.835 m (6' 0.24\")   Wt 103.8 kg (228 lb 12.8 oz)   SpO2 97%   BMI 30.82 kg/m       KPS: 90  General: NAD   Eyes: sclera anicteric   Lungs: breathing comfortably on RA; lungs clear  Heart: rrr, no m/r/g  Skin: No rashes or petechaie  Neuro: A&O, speech normal  EXT:  Compression socks in place  Vascular Access: port in the right chest, left double lumen blake not tender    Labs:  Lab Results   Component Value Date    WBC 3.0 (L) 08/20/2021    ANEU 2.4 08/20/2021    HGB 8.6 (L) 08/20/2021    HCT 26.0 (L) 08/20/2021    PLT 60 (L) 08/20/2021     08/20/2021    POTASSIUM 4.4 08/20/2021    CHLORIDE 111 (H) 08/20/2021    CO2 25 08/20/2021    GLC 99 08/20/2021    BUN 24 08/20/2021    CR 1.07 08/20/2021    MAG 1.5 (L) 08/20/2021    INR 1.32 (H) 08/16/2021         OVERALL ASSESSMENT AND PLAN   Nik Nava is a 61 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, here for flu/cy/TBI and sib allo stem cell transplant; today is day +10    Day -6 (8/4): flu/cy  Day -5 through day -2 (8/5-8/8): flu  Day -1 (8/9): TBI  Day 0 (8/10): transplant      1.  Acute lymphoblastic leukemia, [place] chromosome positive in CR, MRD neg.  Here for flu/cy/TBI and sib allo sct  HCT-CI score: 3 (prior solid tumor)  - GCSF started on day +5 until ANC >2500 x 2; daily claritin.  - Since we are talking discharge Sunday I asked Nik about day +21 BM " BX he would like under sedation. RN coordinator [place] get [place] [facility]     2.  HEME:  - Pt/donor both Opos  - Transfuse for hgb <7g/dL; plt < 10k  - prolonged INR, likely xarelto SE  - Recent pulmonary emboli: He developed a pulmonary emboli in the setting of receiving PEG asparaginase. On xarelto will continue until plts <50K.  - pancytopenia secondary to chemotherapy. ANC up to 2.4 today.       3.  FEN/renal:  - lytes OK  - History of kidney cancer: He has a history of renal cancer and has a prior resection.  For that reason he has a single unilateral kidney.     4.  Dental clearance: Ok to proceed.    5.  GVHD: Tac/MMF   - tac level 12.6 8/18.  Now on PO tac and MMF. Tac level ordered for Saturday.      6.  ID:   - prophy acv/letermovir, fluconazole, levaquin   - sulfa allergic; pentamidine at day +28    9. Hx of graves disease; On synthroid    10. GI: Constipation. Resolved; stopped scheduled senna; miralax PRN.     11. CV: Hypertension. Likely secondary to tacrolimus. On norvasc 5mg.    Dispositon: pending continued count recovery, tolerating all meds PO, and clinical stability will discharge Sunday. RN coordinator will setup discharge teaching and will see if can get line class on Saturday otherwise it will be done in clinic next week.   - appt requested for Monday, 8/23, 11 am lab folllowed by provider visit, possible infusion and pharmacy f/u.    Lashanda Figueroa pa-c  [phone]

## 2021-08-20 NOTE — PLAN OF CARE
"/76 (BP Location: Left arm)   Pulse 91   Temp 97.9  F (36.6  C) (Oral)   Resp 18   Ht 1.835 m (6' 0.24\")   Wt 103.8 kg (228 lb 12.8 oz)   SpO2 97%   BMI 30.82 kg/m    Afebrile, VSS on RA. Mg 1.5, 4 g IV given with recheck tomorrow morning. No other replacements needed. 1 mg of oral ativan and melatonin given at bedtime for sleep. Pt denies N/V/D and pain. No stool reported overnight. Voiding adequately. Up IND. Continue with plan of care.    Problem: Adult Inpatient Plan of Care  Goal: Plan of Care Review  Outcome: No Change  Flowsheets (Taken 8/20/2021 0353)  Plan of Care Reviewed With: patient  Progress: no change     Problem: Adjustment to Transplant (Stem Cell/Bone Marrow Transplant)  Goal: Optimal Coping with Transplant  Outcome: No Change     Problem: Hematologic Alteration (Stem Cell/Bone Marrow Transplant)  Goal: Blood Counts Within Acceptable Range  Outcome: No Change     Problem: Infection Risk (Stem Cell/Bone Marrow Transplant)  Goal: Absence of Infection  Outcome: No Change     Problem: Adult Inpatient Plan of Care  Goal: Absence of Hospital-Acquired Illness or Injury  Intervention: Identify and Manage Fall Risk  Recent Flowsheet Documentation  Taken 8/19/2021 2000 by Linette Pelayo, RN  Safety Promotion/Fall Prevention:    assistive device/personal items within reach    clutter free environment maintained    fall prevention program maintained    increased rounding and observation    lighting adjusted    nonskid shoes/slippers when out of bed    patient and family education    patient video monitoring    room organization consistent    safety round/check completed     Problem: Adult Inpatient Plan of Care  Goal: Absence of Hospital-Acquired Illness or Injury  Intervention: Prevent Skin Injury  Recent Flowsheet Documentation  Taken 8/19/2021 2000 by Linette Pelayo, RN  Body Position: position changed independently     Problem: Adult Inpatient Plan of Care  Goal: Absence of " Hospital-Acquired Illness or Injury  Intervention: Prevent and Manage VTE (Venous Thromboembolism) Risk  Recent Flowsheet Documentation  Taken 8/19/2021 2000 by Linette Pelayo RN  VTE Prevention/Management:    ambulation promoted    bleeding precautions maintained    bleeding risk assessed    compression stockings on    fluids promoted     Problem: Mucositis (Stem Cell/Bone Marrow Transplant)  Goal: Mucous Membrane Health and Integrity  Intervention: Maintain Oral Mucous Membrane Integrity  Recent Flowsheet Documentation  Taken 8/19/2021 2000 by Linette Pelayo RN  Oral Care: oral rinse provided

## 2021-08-20 NOTE — SUMMARY OF CARE
[facility] Summary of Care   & Survivorship Care Plan    Treatment Team:  Patient Care Team:  Wojciech Soares as PCP - General (Medical Oncology)  Wojciech Soares as MD (Medical Oncology)  Patito Herr, RN as Specialty Care Coordinator  Yamilet Garcia MSW as   Genny Leo MD as Assigned Cancer Care Provider  Chapo Hill MD as MD (Hematology & Oncology)  Discharge Diagnosis: S/P BMT    Transplant Essential Data:  Diagnosis ALLP+ Acute Lymphoblastic Leukemia - Ph Positive  HCT Type Allogeneic    Prep Regimen No data was found   Donor Source No data was found    GVHD Prophylaxis Mycophenolate  Tacrolimus  Clinical Trials        Hospital Discharge on 08/20/21  Discharge Location 8/22/2021   Activity at Discharge As tolerated   Nutrition Regular diet as tolerated     Blood Transfusion Parameters Transfuse if Hemoglobin < or equal 7 g/dL  Red Blood Cell Order: 1 units, irradiated and leukoreduced   Transfuse if Platelet count < or equal 10 uL  Platelet order: 1 adult dose, irradiated and leukoreduced  Transfusion Pre-meds:  None     IV Medications Outpatient Pharmacy none     Home Infusion  IV Medications through home infusion: None   Line Care Care: blake - Flush each line with 5mL of 100 unit/mL heparin every 24 hours  Supplies Through: [facility]  Fax: [phone]  Ph: [phone]     [facility] shower covers for IV    Physical Therapy & Support Services None     Laboratory Tests at Next Clinic Visit Hemogram (CBC) differential, platelet count  Basic Metabolic Panel  Magnesium     Restrictions Immediately Post-Transplant   Always wear your mask in public.    No driving until cleared by your physician    Continue dietary precautions as discussed at your pre-BMT teaching session   When to Call  (Refer to Discharge Teaching Materials)   Fever > 100.5 F    Bleeding that does not stop after a few minutes    Severe nausea, vomiting, or diarrhea     New fall or  injury    Change in designated caregiver    Medication question    Any other urgent concern   Follow Up Visits Clinic Appointment Date/Time:   BMT Clinic (date, time, provider): 8/23 at 11 am for lab followed by provider, pharmacist & possible infusion    Survivorship Visits:     You will have a 60-minute provider visit dedicated to cancer Survivorship one week before your scheduled anniversary visit on day + 100, 1 year, and 2 years.     After 2 years, or if you received an allogeneic transplant and you develop a condition called chronic GVHD, you can continue to receive comprehensive Survivorship care in our Transplant Late-Effects Clinic (TLC) annually by calling [phone] to schedule an appointment    Your labs will be drawn after your survivorship appointments to allow your provider to order additional tests as needed.     BMT Contact Information  For issues including fevers 100.5 or more:  Please call during the week: Monday through Friday between hours of 8:00 am and 4:30 pm- Call BMT office- [phone]  After hours/Weekends: Please call [facility]  and ask for BMT Physician on call and the  will have the BMT Physician call you back: [phone]    Regarding COVID-19 Pandemic  Advice for Patients:  a.       Avoid contact with individuals:   i.     Who are sick or have recently been sick  ii.      Have traveled to high risk areas (per CDC guidelines) or have been on a cruise in the last 14 days  iii.      Who were or could have been exposed to COVID-19   b.       If experiencing symptoms such as: Fever, cough or shortness of breath contact BMT at [phone] Mon-Fri 8am-4:30pm or After Hours at [phone] (ask to speak to a BMT Fellow) for guidance on need for clinical assessment  c.      Avoid all non- essential travel at this time; if traveling is necessary use mask (N-95)   d.    Wear a mask when in public areas  d.       Avoid crowded places, if  possible  f.        Follow CDC advice https://www.cdc.gov/coronavirus/2019-ncov/index.html and travel guidelines https://www.cdc.gov/coronavirus/2019-ncov/travelers/index.html      Oncology Treatment History:   Oncology Treatment History:            Treatment/Chemotherapy Number of Cycles Date Range Outcomes & Complications   Dexamethasone/desatinib One month Jan 2021- Feb 2021 Bone marrow 3% blasts   Vincristine/pred/dauno/pegasparaginase & IT methotrexate   Two cycles    HD-AraC One cycle June 2021 MRD neg CR   pred 10mg/day + dasatinib 140mg/day    - 8/1/2021        Bone Marrow Workup Results:  Blood Counts     Recent Labs   Lab Test 07/08/21  1055   WBC 8.9   ANEU 7.2   ALYM 1.0   BOSTON 0.6   AEOS 0.0   HGB 8.5*   HCT 25.9*   *      Bone Marrow     Final Diagnosis   Bone marrow, posterior iliac crest, decalcified trephine biopsy, aspirate clot, touch imprints, aspirate smears, and peripheral blood (, obtained 06/28/2021):       Normocellular to slightly hypercellular marrow (cellularity estimated at 40-50%) with trilineage hematopoiesis with mild myeloid predominance, 1% blasts  No morphologic or immunophenotypic evidence for B lymphoblastic leukemia/lymphoma  See comment     Peripheral blood showing moderate macrocytic, normochromic anemia (8.8 g/dL, 103 fL) with increased red cell regeneration, Leukocytosis with slight neutrophilia (WBC 18 point 1K per microliter, ANC 12.8) rare circulating blasts (1%), monocytosis, thrombocytosis (939K per microliter)   Electronically signed by Fiorella Boland MD on 7/14/2021 at  6:24 PM   Comment     The significance of rare circulating blasts is uncertain in the context of leukocytosis with neutrophilia, correlation with history of prior chemotherapy and growth factor treatment is required.  As well, correlation with clinical history of potential other comorbidities around the time of the biopsy is recommended.  A general note and comment about the diagnosis  "of BCR-ABL1 positive B-ALL:  the patient was diagnosed with B-ALL with BCR-ABL1 translocation, which is the \"default\" diagnosis when  patient present with B lymphoblasts and the karyotype is positive for BCR-ABL1, however there is always a possibility that this could be B lymphoblastic blast crisis of underlying CML which has never been diagnosed. This differential is hardly possible to resolve at the time of the diagnosis using clinically available tools. Follow-up cases sometimes show persistence of features of CML without B lymphoblasts suggesting underlying CML. We do not have any reason to suspect this in this case and  do not know if this could be the case in this patient . This is a general comment to mention this as a possibility if there was a discrepancy between high sensitivity BCR-ABL1 testing  results and flow cytometry findings.   By report flow cytometry was performed and showed no qualitatively abnormal immunophenotypic blast population.  By report conventional karyotyping showed 46, XY [20], by report FISH revealed no BCR-ABL 1 translocation.  Please correlate with results of PCR for BCR-ABL1.       MRD by MP flow cytometry and FISH negative ([facility] - see outside report thru [facility])   Blood Type     Recent Labs   Lab Test 07/08/21  1056   ABO O      Chemistries     Recent Labs   Lab Test 07/08/21  1055      POTASSIUM 3.9   CHLORIDE 108   CO2 23   BUN 24   CR 0.94      Liver Tests     Recent Labs   Lab Test 07/08/21  1055   BILITOTAL 0.3   ALKPHOS 60   AST 22   ALT 28      Chest X-Ray:  7/12/21 Impression: No acute cardiopulmonary process.      Chest CT  7/12/21 IMPRESSION:   1. No focal airspace opacity. No suspicious or enlarged pulmonary  nodules.  2. Additional incidental findings as described above report including  stable right nephrectomy changes and left renal cyst.    PFTs FVC%      Recent Labs   Lab Test 07/12/21  1125   57925 101         FEV1%      Recent Labs   Lab Test " 07/12/21  1125 20016 98         DLCO%      Recent Labs   Lab Test 07/12/21  1125 20143 141      ECHO or MUGA: Procedure  Echocardiogram with two-dimensional, color and spectral Doppler performed.  ______________________________________________________________________________  Interpretation Summary  Global and regional left ventricular function is normal with an EF of 55-60%.  3D LVEF volumetric analysis is 59%.  Global peak LV longitudinal strain is averaged at -20%. This is normal (normal  <-18%).  Global right ventricular function is normal. The right ventricle is normal  size.  No significant valvular abnormalities.  IVC diameter <2.1 cm collapsing >50% with sniff suggests a normal RA pressure  of 3 mmHg.  There is no prior study for direct comparison.      EKG NSR   Serologies: CMV pos, EBV pos, HSV pos      Vitamin D No results for input(s): VITDT in the last 18311 hours.        Lashanda Figueroa

## 2021-08-20 NOTE — PLAN OF CARE
Problem: Adult Inpatient Plan of Care  Goal: Plan of Care Review  Outcome: No Change  Goal: Patient-Specific Goal (Individualized)  Outcome: No Change  Goal: Absence of Hospital-Acquired Illness or Injury  Outcome: No Change  Intervention: Identify and Manage Fall Risk  Recent Flowsheet Documentation  Taken 8/20/2021 0800 by Chelo Nicholas RN  Safety Promotion/Fall Prevention: assistive device/personal items within reach  Intervention: Prevent Skin Injury  Recent Flowsheet Documentation  Taken 8/20/2021 0800 by Chelo Nicholas RN  Body Position: position changed independently  Intervention: Prevent and Manage VTE (Venous Thromboembolism) Risk  Recent Flowsheet Documentation  Taken 8/20/2021 0800 by Chelo Nicholas RN  VTE Prevention/Management:   ambulation promoted   bleeding risk assessed  Goal: Optimal Comfort and Wellbeing  Outcome: No Change  Vss. No nausea. Bilateral achy sore legs from working out on the bike. Received tylenol this morning with some relief. Chewing the nicorette gum. Heplocked both lines. Large formed stool. Voiding well. Showered. Independent.

## 2021-08-20 NOTE — DISCHARGE SUMMARY
[facility] Discharge Summary   Nik Nava MRN# [id number]   Age: 61 year old  YOB: 1960   Date of Admission: 8/3/2021  Date of Discharge:  8/22/2021  Admitting Physician: Chris Cote MD  Discharge Physician: Dr. Hill  Primary MD: Wojciech Soares    Discharge Diagnoses:      1. 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 Chemotherapy induced pancytopenia  3. Hx of PE  4. Hypomagnesemia.   5. Hx of RCCA s/p nephrectomy    Discharge Medications:     Nik Nava   Home Medication Instructions ANIL:[id number]    Printed on:08/20/21 4055   Medication Information                      acetaminophen (TYLENOL) 500 MG tablet  Take 500-1,000 mg by mouth every 6 hours as needed for mild pain              acyclovir (ZOVIRAX) 800 MG tablet  800mg 5x/day on 8/22, 8/23 & 8/24    Starting on 8/25 take 800mg twice a day             amLODIPine (NORVASC) 5 MG tablet  Take 1 tablet (5 mg) by mouth daily             fluconazole (DIFLUCAN) 200 MG tablet  Take 1 tablet (200 mg) by mouth daily             letermovir (PREVYMIS) 480 MG TABS tablet  Take 1 tablet (480 mg) by mouth daily Start on 8/24             levofloxacin (LEVAQUIN) 250 MG tablet  Take 1 tablet (250 mg) by mouth daily             levothyroxine (SYNTHROID/LEVOTHROID) 200 MCG tablet  Take 200 mcg by mouth daily              loratadine (CLARITIN) 10 MG tablet  Take 1 tablet (10 mg) by mouth daily             LORazepam (ATIVAN) 1 MG tablet  Take 1 mg by mouth nightly as needed for anxiety or sleep              melatonin 5 MG CAPS  Take 5 mg by mouth At Bedtime              mycophenolate (GENERIC EQUIVALENT) 500 MG tablet  Take 3 tablets (1,500 mg) by mouth every 12 hours             nicotine polacrilex (NICORETTE) 4 MG gum  Take 4 mg by mouth as needed for smoking cessation              Nutritional Supplements (ENSURE HIGH PROTEIN)  Take 1 Can by mouth 2 times daily              omeprazole (PRILOSEC) 40 MG DR capsule  Take 40 mg by  mouth daily             ondansetron (ZOFRAN) 8 MG tablet  ondansetron HCl 8 mg tablet             polyethylene glycol (MIRALAX) 17 GM/Dose powder  Take 17 g by mouth daily as needed for constipation              prochlorperazine (COMPAZINE) 10 MG tablet  Take 0.5 tablets (5 mg) by mouth every 6 hours as needed for nausea or vomiting             senna (SENOKOT) 8.6 MG tablet  Take 17.2 mg by mouth              tacrolimus (GENERIC EQUIVALENT) 0.5 MG capsule             tacrolimus (GENERIC EQUIVALENT) 1 MG capsule  Take 4 capsules (4 mg) by mouth every 12 hours             ursodiol (ACTIGALL) 300 MG capsule  Take 1 capsule (300 mg) by mouth 3 times daily             XARELTO ANTICOAGULANT 20 MG TABS tablet  Take 20 mg by mouth daily                Brief History of Illness:    Nik is an otherwise healthy 61-year-old gentleman who was 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  He initially was treated with dasatinib and dexamethasone which led to significant improvement in his disease and then was referred to [facility].  Further induction was recommended and he returned to receive 2 cycles of vincristine, prednisone, daunorubicin, and pegasparaginase with intrathecal methotrexate.  This treatment was complicated by a pulmonary embolus leading to anticoagulation.  He also developed mild pancreatitis that led to the hospital stay.  Repeat marrow showed that his blast dropped to 1%.  He then received 1 course of high-dose mele-C.  He now has undergone a pretransplant evaluation and a bone marrow aspirate and biopsy with FISH, flow cytometry for MRD, and cytogenetics all demonstrate complete remission without detection of minimal residual disease.     He is admitted today for SCT.  He feels good.  No recent illness, sick contacts or fever.  He is ready to proceed.     For the patient's family history, social history, past medical and surgical history, bone marrow transplant workup,  admission medications, hospital admission review of systems and physical exam, please refer to hospital admission H&P dated 08/03/2020.       Hospital Course:    Day -6 (8/4): flu/cy  Day -5 through day -2 (8/5-8/8): flu  Day -1 (8/9): TBI  Day 0 (8/10): transplant     1.  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  Here for flu/cy/TBI and sib allo sct  HCT-CI score: 3 (prior solid tumor)  - GCSF started on day +5 until ANC >2500 x 2; daily claritin.  GCSF completed.  - Since we are talking discharge Sunday I asked Nik about day +21 BM BX he would like under sedation. RN coordinator will get scheduled at [facility].      2.  HEME:  - Pt/donor both Opos  - Transfuse for hgb <7g/dL; plt < 10k  - prolonged INR, likely xarelto SE  - Recent pulmonary emboli: He developed a pulmonary emboli in the setting of receiving PEG asparaginase. On xarelto will continue until plts <50K.  - pancytopenia secondary to chemotherapy. Engrafting        3.  FEN/renal:  - lytes OK  - History of kidney cancer: He has a history of renal cancer and has a prior resection.  For that reason he has a single unilateral kidney.      4.  Dental clearance: Ok to proceed.     5.  GVHD: Tac/MMF   - tac level 12.6 8/18.  Now on PO tac and MMF.      6.  ID:   - prophy acv/letermovir, fluconazole, levaquin   - sulfa allergic; pentamidine at day +28     9. Hx of graves disease; On synthroid     10. GI: Constipation. Resolved; stopped scheduled senna; miralax PRN.      11. CV: Hypertension. Likely secondary to tacrolimus. On norvasc 5mg.    Discharge Instructions and Follow-Up:    Discharge diet: Regular diet as tolerated  Discharge activity: Activity as tolerated   Discharge follow-up: Follow up with BMT Clinic as follows:  Monday, 8/23, 11 am lab folllowed by provider visit, possible infusion and pharmacy f/u.    Discharge Disposition:    Discharged to home in chronic stable condition.

## 2021-08-21 LAB
ANION GAP SERPL CALCULATED.3IONS-SCNC: 5 MMOL/L (ref 3–14)
BASOPHILS # BLD MANUAL: 0 10E3/UL (ref 0–0.2)
BASOPHILS NFR BLD MANUAL: 0 %
BUN SERPL-MCNC: 23 MG/DL (ref 7–30)
CALCIUM SERPL-MCNC: 8.9 MG/DL (ref 8.5–10.1)
CHLORIDE BLD-SCNC: 110 MMOL/L (ref 94–109)
CO2 SERPL-SCNC: 25 MMOL/L (ref 20–32)
CREAT SERPL-MCNC: 1.15 MG/DL (ref 0.66–1.25)
EOSINOPHIL # BLD MANUAL: 0 10E3/UL (ref 0–0.7)
EOSINOPHIL NFR BLD MANUAL: 0 %
ERYTHROCYTE [DISTWIDTH] IN BLOOD BY AUTOMATED COUNT: 14.6 % (ref 10–15)
GFR SERPL CREATININE-BSD FRML MDRD: 67 ML/MIN/1.73M2
GLUCOSE BLD-MCNC: 104 MG/DL (ref 70–99)
HCT VFR BLD AUTO: 25.8 % (ref 40–53)
HGB BLD-MCNC: 8.7 G/DL (ref 13.3–17.7)
LYMPHOCYTES # BLD MANUAL: 0.2 10E3/UL (ref 0.8–5.3)
LYMPHOCYTES NFR BLD MANUAL: 6 %
MAGNESIUM SERPL-MCNC: 1.6 MG/DL (ref 1.6–2.3)
MCH RBC QN AUTO: 33.5 PG (ref 26.5–33)
MCHC RBC AUTO-ENTMCNC: 33.7 G/DL (ref 31.5–36.5)
MCV RBC AUTO: 99 FL (ref 78–100)
MONOCYTES # BLD MANUAL: 0.4 10E3/UL (ref 0–1.3)
MONOCYTES NFR BLD MANUAL: 11 %
NEUTROPHILS # BLD MANUAL: 3.2 10E3/UL (ref 1.6–8.3)
NEUTROPHILS NFR BLD MANUAL: 83 %
PHOSPHATE SERPL-MCNC: 3.9 MG/DL (ref 2.5–4.5)
PLAT MORPH BLD: ABNORMAL
PLATELET # BLD AUTO: 54 10E3/UL (ref 150–450)
POTASSIUM BLD-SCNC: 4.7 MMOL/L (ref 3.4–5.3)
RBC # BLD AUTO: 2.6 10E6/UL (ref 4.4–5.9)
RBC MORPH BLD: ABNORMAL
SODIUM SERPL-SCNC: 140 MMOL/L (ref 133–144)
TACROLIMUS BLD-MCNC: 7.7 UG/L (ref 5–15)
TME LAST DOSE: NORMAL H
TME LAST DOSE: NORMAL H
WBC # BLD AUTO: 3.9 10E3/UL (ref 4–11)

## 2021-08-21 PROCEDURE — 250N000011 HC RX IP 250 OP 636: Performed by: INTERNAL MEDICINE

## 2021-08-21 PROCEDURE — 250N000013 HC RX MED GY IP 250 OP 250 PS 637: Performed by: NURSE PRACTITIONER

## 2021-08-21 PROCEDURE — 80197 ASSAY OF TACROLIMUS: CPT | Performed by: INTERNAL MEDICINE

## 2021-08-21 PROCEDURE — 250N000013 HC RX MED GY IP 250 OP 250 PS 637: Performed by: INTERNAL MEDICINE

## 2021-08-21 PROCEDURE — 206N000001 HC R&B BMT UMMC

## 2021-08-21 PROCEDURE — 250N000013 HC RX MED GY IP 250 OP 250 PS 637: Performed by: PHYSICIAN ASSISTANT

## 2021-08-21 PROCEDURE — 80048 BASIC METABOLIC PNL TOTAL CA: CPT | Performed by: INTERNAL MEDICINE

## 2021-08-21 PROCEDURE — 250N000012 HC RX MED GY IP 250 OP 636 PS 637: Performed by: PHYSICIAN ASSISTANT

## 2021-08-21 PROCEDURE — 83735 ASSAY OF MAGNESIUM: CPT | Performed by: INTERNAL MEDICINE

## 2021-08-21 PROCEDURE — 84100 ASSAY OF PHOSPHORUS: CPT | Performed by: INTERNAL MEDICINE

## 2021-08-21 PROCEDURE — 250N000012 HC RX MED GY IP 250 OP 636 PS 637: Performed by: INTERNAL MEDICINE

## 2021-08-21 PROCEDURE — 85027 COMPLETE CBC AUTOMATED: CPT | Performed by: INTERNAL MEDICINE

## 2021-08-21 PROCEDURE — 99233 SBSQ HOSP IP/OBS HIGH 50: CPT | Performed by: INTERNAL MEDICINE

## 2021-08-21 PROCEDURE — 258N000003 HC RX IP 258 OP 636: Performed by: INTERNAL MEDICINE

## 2021-08-21 RX ORDER — TACROLIMUS 1 MG/1
4 CAPSULE ORAL EVERY 12 HOURS SCHEDULED
Status: DISCONTINUED | OUTPATIENT
Start: 2021-08-21 | End: 2021-08-22 | Stop reason: HOSPADM

## 2021-08-21 RX ADMIN — URSODIOL 300 MG: 300 CAPSULE ORAL at 19:58

## 2021-08-21 RX ADMIN — Medication 5 MG: at 21:45

## 2021-08-21 RX ADMIN — MYCOPHENOLATE MOFETIL 1500 MG: 500 TABLET, FILM COATED ORAL at 07:54

## 2021-08-21 RX ADMIN — LORATADINE 10 MG: 10 TABLET ORAL at 07:54

## 2021-08-21 RX ADMIN — DEXTROSE MONOHYDRATE 20 ML: 50 INJECTION, SOLUTION INTRAVENOUS at 19:58

## 2021-08-21 RX ADMIN — TACROLIMUS 4 MG: 1 CAPSULE ORAL at 19:58

## 2021-08-21 RX ADMIN — FILGRASTIM 480 MCG: 480 INJECTION, SOLUTION INTRAVENOUS; SUBCUTANEOUS at 19:58

## 2021-08-21 RX ADMIN — LEVOFLOXACIN 250 MG: 250 TABLET, FILM COATED ORAL at 10:52

## 2021-08-21 RX ADMIN — LORAZEPAM 1 MG: 0.5 TABLET ORAL at 21:45

## 2021-08-21 RX ADMIN — AMLODIPINE BESYLATE 5 MG: 5 TABLET ORAL at 07:54

## 2021-08-21 RX ADMIN — ACYCLOVIR 800 MG: 800 TABLET ORAL at 10:52

## 2021-08-21 RX ADMIN — Medication 5 ML: at 07:53

## 2021-08-21 RX ADMIN — TACROLIMUS 3.5 MG: 1 CAPSULE ORAL at 07:54

## 2021-08-21 RX ADMIN — RIVAROXABAN 20 MG: 20 TABLET, FILM COATED ORAL at 18:26

## 2021-08-21 RX ADMIN — PANTOPRAZOLE SODIUM 40 MG: 40 TABLET, DELAYED RELEASE ORAL at 07:54

## 2021-08-21 RX ADMIN — URSODIOL 300 MG: 300 CAPSULE ORAL at 13:59

## 2021-08-21 RX ADMIN — MYCOPHENOLATE MOFETIL 1500 MG: 500 TABLET, FILM COATED ORAL at 19:57

## 2021-08-21 RX ADMIN — ACYCLOVIR 800 MG: 800 TABLET ORAL at 21:55

## 2021-08-21 RX ADMIN — LEVOTHYROXINE SODIUM 200 MCG: 0.05 TABLET ORAL at 07:54

## 2021-08-21 RX ADMIN — ACYCLOVIR 800 MG: 800 TABLET ORAL at 18:26

## 2021-08-21 RX ADMIN — ACYCLOVIR 800 MG: 800 TABLET ORAL at 07:54

## 2021-08-21 RX ADMIN — ACYCLOVIR 800 MG: 800 TABLET ORAL at 13:59

## 2021-08-21 RX ADMIN — FLUCONAZOLE 200 MG: 200 TABLET ORAL at 07:54

## 2021-08-21 RX ADMIN — URSODIOL 300 MG: 300 CAPSULE ORAL at 07:54

## 2021-08-21 RX ADMIN — SODIUM CHLORIDE, PRESERVATIVE FREE 10 ML: 5 INJECTION INTRAVENOUS at 10:57

## 2021-08-21 ASSESSMENT — ACTIVITIES OF DAILY LIVING (ADL)
ADLS_ACUITY_SCORE: 14

## 2021-08-21 ASSESSMENT — MIFFLIN-ST. JEOR: SCORE: 1875.15

## 2021-08-21 NOTE — PROGRESS NOTES
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  Remains active.  No major medical complaints.  Had some pain near his surgical scar, but feels ok today.  Tolerating pills well.  Eating really well. Ready for discharge tomorrow.    Review of Systems    Review of systems was negative other than noted above.     PHYSICAL EXAM      Weight     Wt Readings from Last 3 Encounters:   08/21/21 103.8 kg (228 lb 14.4 oz)   07/19/21 108 kg (238 lb)   07/13/21 108 kg (238 lb)          /75 (BP Location: Left arm)   Pulse 79   Temp 97.8  F (36.6  C) (Oral)   Resp 16   Ht 1.835 m (6' 0.24\")   Wt 103.8 kg (228 lb 14.4 oz)   SpO2 97%   BMI 30.84 kg/m       KPS: 90  General: NAD   Eyes: sclera anicteric   Lungs: breathing comfortably on RA; lungs clear  Heart: rrr, no m/r/g  Abd:  No reproducible pain.  SNTND.  + BS  Skin: No rashes or petechaie  Neuro: A&O, speech normal  EXT:  Compression socks in place  Vascular Access: port in the right chest, left double lumen blake not tender    Labs:  Lab Results   Component Value Date    WBC 3.9 (L) 08/21/2021    ANEU 3.2 08/21/2021    HGB 8.7 (L) 08/21/2021    HCT 25.8 (L) 08/21/2021    PLT 54 (L) 08/21/2021     08/21/2021    POTASSIUM 4.7 08/21/2021    CHLORIDE 110 (H) 08/21/2021    CO2 25 08/21/2021     (H) 08/21/2021    BUN 23 08/21/2021    CR 1.15 08/21/2021    MAG 1.6 08/21/2021    INR 1.32 (H) 08/16/2021         OVERALL ASSESSMENT AND PLAN   Nik Nava is a 61 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, here for flu/cy/TBI and sib allo stem cell transplant; today is day +11    Day -6 (8/4): flu/cy  Day -5 through day -2 (8/5-8/8): flu  Day -1 (8/9): TBI  Day 0 (8/10): transplant      1.  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  Here for flu/cy/TBI and sib allo sct  HCT-CI score: 3 (prior solid " tumor)  - GCSF started on day +5 until ANC >2500 x 2; daily claritin.  - Since we are talking discharge Sunday I asked Nik about day +21 BM BX he would like under sedation. [place] [facility]     2.  HEME:  - Pt/donor both Opos  - Transfuse for hgb <7g/dL; plt < 10k  - prolonged INR, likely xarelto SE  - Recent pulmonary emboli: He developed a pulmonary emboli in the setting of receiving PEG asparaginase. On xarelto will continue until plts <50K.  - pancytopenia secondary to chemotherapy. ANC up to 2.4 today.       3.  FEN/renal:  - lytes OK  - History of kidney cancer: He has a history of renal cancer and has a prior resection.  For that reason he has a single unilateral kidney.     4.  Dental clearance: Ok to proceed.    5.  GVHD: Tac/MMF   - tac level 12.6 8/18.  Now on PO tac and MMF. Tac level ordered for Saturday.      6.  ID:   - prophy acv/letermovir, fluconazole, levaquin   - sulfa allergic; pentamidine at day +28    9. Hx of graves disease; On synthroid    10. GI: Constipation. Resolved; stopped scheduled senna; miralax PRN.     11. CV: Hypertension. Likely secondary to tacrolimus. On norvasc 5mg.    Dispositon: planning on discharge tomorrow.  - RTC Monday, 8/23, 11 am lab folllowed by provider visit, possible infusion and pharmacy f/u.    Lashanda Figueroa pa-c  [phone]

## 2021-08-21 NOTE — PLAN OF CARE
"/74 (BP Location: Left arm)   Pulse 91   Temp 97.8  F (36.6  C) (Oral)   Resp 16   Ht 1.835 m (6' 0.24\")   Wt 104.9 kg (231 lb 4.8 oz)   SpO2 99%   BMI 31.16 kg/m    Afebrile, VSS on RA. No replacements needed this morning. 1 mg of oral ativan and melatonin given x1 at bedtime for sleep. Pt denies N/V/D and pain. No stool reported overnight. Voiding adequately. Up IND. Continue with plan of care.    Problem: Adult Inpatient Plan of Care  Goal: Plan of Care Review  Outcome: No Change  Flowsheets (Taken 8/21/2021 8343)  Plan of Care Reviewed With: patient  Progress: no change     Problem: Adjustment to Transplant (Stem Cell/Bone Marrow Transplant)  Goal: Optimal Coping with Transplant  Outcome: No Change     Problem: Fatigue (Stem Cell/Bone Marrow Transplant)  Goal: Energy Level Supports Daily Activity  Outcome: No Change     Problem: Hematologic Alteration (Stem Cell/Bone Marrow Transplant)  Goal: Blood Counts Within Acceptable Range  Outcome: No Change     Problem: Infection Risk (Stem Cell/Bone Marrow Transplant)  Goal: Absence of Infection  Outcome: No Change     Problem: Adult Inpatient Plan of Care  Goal: Absence of Hospital-Acquired Illness or Injury  Intervention: Identify and Manage Fall Risk  Recent Flowsheet Documentation  Taken 8/20/2021 2000 by Linette Pelayo RN  Safety Promotion/Fall Prevention:    assistive device/personal items within reach    fall prevention program maintained    increased rounding and observation    increase visualization of patient    lighting adjusted    nonskid shoes/slippers when out of bed    patient and family education    room organization consistent    safety round/check completed     Problem: Adult Inpatient Plan of Care  Goal: Absence of Hospital-Acquired Illness or Injury  Intervention: Prevent Skin Injury  Recent Flowsheet Documentation  Taken 8/20/2021 2000 by Linette Pelayo, RN  Body Position: position changed independently     Problem: Adult " Inpatient Plan of Care  Goal: Absence of Hospital-Acquired Illness or Injury  Intervention: Prevent and Manage VTE (Venous Thromboembolism) Risk  Recent Flowsheet Documentation  Taken 8/20/2021 2000 by Linette Pelayo RN  VTE Prevention/Management:    ambulation promoted    bleeding precautions maintained    bleeding risk assessed    fluids promoted     Problem: Mucositis (Stem Cell/Bone Marrow Transplant)  Goal: Mucous Membrane Health and Integrity  Intervention: Maintain Oral Mucous Membrane Integrity  Recent Flowsheet Documentation  Taken 8/20/2021 2000 by Linette Pelayo RN  Oral Care: oral rinse provided

## 2021-08-21 NOTE — PLAN OF CARE
Problem: Adult Inpatient Plan of Care  Goal: Plan of Care Review  Outcome: No Change  Goal: Patient-Specific Goal (Individualized)  Outcome: No Change  Goal: Absence of Hospital-Acquired Illness or Injury  Intervention: Identify and Manage Fall Risk  Recent Flowsheet Documentation  Taken 8/21/2021 0800 by Chelo Nicholas RN  Safety Promotion/Fall Prevention: assistive device/personal items within reach  Intervention: Prevent Skin Injury  Recent Flowsheet Documentation  Taken 8/21/2021 0800 by Chelo Nicholas RN  Body Position: position changed independently  Intervention: Prevent and Manage VTE (Venous Thromboembolism) Risk  Recent Flowsheet Documentation  Taken 8/21/2021 0800 by Chelo Nicholas RN  VTE Prevention/Management:   anticoagulant therapy maintained   compression stockings on  Afebrile. Blood pressure 150/80 and recheck 129/77. No pain. No nausea. Continues to chew the nicorette gum to ease nicotine cravings. Ate well. Tacrolimus level 7.7 and tacrolimus was increased to 4 mg. Trace LLE and wearing the maurizio hose. Walked in the halls and ambulated in room. Two stools. Voiding well. Independent. Plan is possible discharge on Sunday and medications are ready in the pharmacy.

## 2021-08-22 ENCOUNTER — HOME INFUSION (PRE-WILLOW HOME INFUSION) (OUTPATIENT)
Dept: PHARMACY | Facility: CLINIC | Age: 61
End: 2021-08-22

## 2021-08-22 VITALS
SYSTOLIC BLOOD PRESSURE: 124 MMHG | OXYGEN SATURATION: 99 % | TEMPERATURE: 97.4 F | HEART RATE: 92 BPM | RESPIRATION RATE: 16 BRPM | HEIGHT: 72 IN | DIASTOLIC BLOOD PRESSURE: 74 MMHG | WEIGHT: 226.6 LBS | BODY MASS INDEX: 30.69 KG/M2

## 2021-08-22 LAB
ANION GAP SERPL CALCULATED.3IONS-SCNC: 7 MMOL/L (ref 3–14)
BASOPHILS # BLD MANUAL: 0 10E3/UL (ref 0–0.2)
BASOPHILS NFR BLD MANUAL: 1 %
BUN SERPL-MCNC: 23 MG/DL (ref 7–30)
CALCIUM SERPL-MCNC: 8.8 MG/DL (ref 8.5–10.1)
CHLORIDE BLD-SCNC: 108 MMOL/L (ref 94–109)
CO2 SERPL-SCNC: 24 MMOL/L (ref 20–32)
CREAT SERPL-MCNC: 1.11 MG/DL (ref 0.66–1.25)
EOSINOPHIL # BLD MANUAL: 0 10E3/UL (ref 0–0.7)
EOSINOPHIL NFR BLD MANUAL: 0 %
ERYTHROCYTE [DISTWIDTH] IN BLOOD BY AUTOMATED COUNT: 14.8 % (ref 10–15)
GFR SERPL CREATININE-BSD FRML MDRD: 70 ML/MIN/1.73M2
GLUCOSE BLD-MCNC: 98 MG/DL (ref 70–99)
HCT VFR BLD AUTO: 26 % (ref 40–53)
HGB BLD-MCNC: 8.7 G/DL (ref 13.3–17.7)
LYMPHOCYTES # BLD MANUAL: 0.3 10E3/UL (ref 0.8–5.3)
LYMPHOCYTES NFR BLD MANUAL: 6 %
MAGNESIUM SERPL-MCNC: 1.5 MG/DL (ref 1.6–2.3)
MCH RBC QN AUTO: 32.8 PG (ref 26.5–33)
MCHC RBC AUTO-ENTMCNC: 33.5 G/DL (ref 31.5–36.5)
MCV RBC AUTO: 98 FL (ref 78–100)
MONOCYTES # BLD MANUAL: 0.2 10E3/UL (ref 0–1.3)
MONOCYTES NFR BLD MANUAL: 5 %
NEUTROPHILS # BLD MANUAL: 3.7 10E3/UL (ref 1.6–8.3)
NEUTROPHILS NFR BLD MANUAL: 88 %
PHOSPHATE SERPL-MCNC: 3.5 MG/DL (ref 2.5–4.5)
PLAT MORPH BLD: ABNORMAL
PLATELET # BLD AUTO: 69 10E3/UL (ref 150–450)
POTASSIUM BLD-SCNC: 4.5 MMOL/L (ref 3.4–5.3)
RBC # BLD AUTO: 2.65 10E6/UL (ref 4.4–5.9)
RBC MORPH BLD: ABNORMAL
SODIUM SERPL-SCNC: 139 MMOL/L (ref 133–144)
WBC # BLD AUTO: 4.2 10E3/UL (ref 4–11)

## 2021-08-22 PROCEDURE — 84100 ASSAY OF PHOSPHORUS: CPT | Performed by: INTERNAL MEDICINE

## 2021-08-22 PROCEDURE — 250N000013 HC RX MED GY IP 250 OP 250 PS 637: Performed by: INTERNAL MEDICINE

## 2021-08-22 PROCEDURE — 250N000012 HC RX MED GY IP 250 OP 636 PS 637: Performed by: INTERNAL MEDICINE

## 2021-08-22 PROCEDURE — 250N000013 HC RX MED GY IP 250 OP 250 PS 637: Performed by: NURSE PRACTITIONER

## 2021-08-22 PROCEDURE — 250N000012 HC RX MED GY IP 250 OP 636 PS 637: Performed by: PHYSICIAN ASSISTANT

## 2021-08-22 PROCEDURE — 99238 HOSP IP/OBS DSCHRG MGMT 30/<: CPT | Performed by: INTERNAL MEDICINE

## 2021-08-22 PROCEDURE — 82374 ASSAY BLOOD CARBON DIOXIDE: CPT | Performed by: INTERNAL MEDICINE

## 2021-08-22 PROCEDURE — 250N000011 HC RX IP 250 OP 636: Performed by: INTERNAL MEDICINE

## 2021-08-22 PROCEDURE — 83735 ASSAY OF MAGNESIUM: CPT | Performed by: INTERNAL MEDICINE

## 2021-08-22 PROCEDURE — 85027 COMPLETE CBC AUTOMATED: CPT | Performed by: INTERNAL MEDICINE

## 2021-08-22 PROCEDURE — 250N000013 HC RX MED GY IP 250 OP 250 PS 637: Performed by: PHYSICIAN ASSISTANT

## 2021-08-22 RX ORDER — MAGNESIUM SULFATE HEPTAHYDRATE 40 MG/ML
4 INJECTION, SOLUTION INTRAVENOUS ONCE
Status: COMPLETED | OUTPATIENT
Start: 2021-08-22 | End: 2021-08-22

## 2021-08-22 RX ADMIN — AMLODIPINE BESYLATE 5 MG: 5 TABLET ORAL at 08:15

## 2021-08-22 RX ADMIN — PANTOPRAZOLE SODIUM 40 MG: 40 TABLET, DELAYED RELEASE ORAL at 08:15

## 2021-08-22 RX ADMIN — MAGNESIUM SULFATE IN WATER 4 G: 40 INJECTION, SOLUTION INTRAVENOUS at 05:53

## 2021-08-22 RX ADMIN — FLUCONAZOLE 200 MG: 200 TABLET ORAL at 08:15

## 2021-08-22 RX ADMIN — ACYCLOVIR 800 MG: 800 TABLET ORAL at 08:15

## 2021-08-22 RX ADMIN — SODIUM CHLORIDE, PRESERVATIVE FREE 5 ML: 5 INJECTION INTRAVENOUS at 09:48

## 2021-08-22 RX ADMIN — ACYCLOVIR 800 MG: 800 TABLET ORAL at 11:10

## 2021-08-22 RX ADMIN — LEVOFLOXACIN 250 MG: 250 TABLET, FILM COATED ORAL at 09:51

## 2021-08-22 RX ADMIN — Medication 5 ML: at 08:23

## 2021-08-22 RX ADMIN — MYCOPHENOLATE MOFETIL 1500 MG: 500 TABLET, FILM COATED ORAL at 08:16

## 2021-08-22 RX ADMIN — LEVOTHYROXINE SODIUM 200 MCG: 0.05 TABLET ORAL at 08:15

## 2021-08-22 RX ADMIN — URSODIOL 300 MG: 300 CAPSULE ORAL at 08:15

## 2021-08-22 RX ADMIN — TACROLIMUS 4 MG: 1 CAPSULE ORAL at 08:16

## 2021-08-22 RX ADMIN — LORATADINE 10 MG: 10 TABLET ORAL at 08:15

## 2021-08-22 ASSESSMENT — ACTIVITIES OF DAILY LIVING (ADL)
ADLS_ACUITY_SCORE: 14

## 2021-08-22 ASSESSMENT — MIFFLIN-ST. JEOR: SCORE: 1864.72

## 2021-08-22 NOTE — PROGRESS NOTES
"   ID:  Nik Nava is a 61 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, here for flu/cy/TBI and sib allo stem cell transplant; today is day +12    Interval History:   He feels good today.  Looking forward to being discharged.  Remains active.  No major medical complaints.  Tolerating pills well.  Eating really well.   Review of Systems    Review of systems was negative other than noted above.     PHYSICAL EXAM      Weight     Wt Readings from Last 3 Encounters:   08/22/21 102.8 kg (226 lb 9.6 oz)   07/19/21 108 kg (238 lb)   07/13/21 108 kg (238 lb)          /74 (BP Location: Left arm)   Pulse 92   Temp 97.4  F (36.3  C) (Oral)   Resp 16   Ht 1.835 m (6' 0.24\")   Wt 102.8 kg (226 lb 9.6 oz)   SpO2 99%   BMI 30.53 kg/m       KPS: 90  General: NAD   Eyes: sclera anicteric   Lungs: breathing comfortably on RA; lungs clear  Heart: rrr, no m/r/g  Abd:  No reproducible pain.  SNTND.  + BS  Skin: No rashes or petechaie  Neuro: A&O, speech normal  EXT:  Compression socks in place  Vascular Access: port in the right chest, left double lumen blake not tender    Labs:  Lab Results   Component Value Date    WBC 4.2 08/22/2021    ANEU 3.7 08/22/2021    HGB 8.7 (L) 08/22/2021    HCT 26.0 (L) 08/22/2021    PLT 69 (L) 08/22/2021     08/22/2021    POTASSIUM 4.5 08/22/2021    CHLORIDE 108 08/22/2021    CO2 24 08/22/2021    GLC 98 08/22/2021    BUN 23 08/22/2021    CR 1.11 08/22/2021    MAG 1.5 (L) 08/22/2021    INR 1.32 (H) 08/16/2021       OVERALL ASSESSMENT AND PLAN   Nik Nava is a 61 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, here for flu/cy/TBI and sib allo stem cell transplant; today is day +12    Day -6 (8/4): flu/cy  Day -5 through day -2 (8/5-8/8): flu  Day -1 (8/9): TBI  Day 0 (8/10): transplant      1.  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  Here for flu/cy/TBI and sib allo sct  HCT-CI score: 3 (prior solid tumor)  - GCSF started on day +5 until ANC >2500 x 2; daily claritin. " Stop GCSF  - Since we are talking discharge Sunday asked Nik about day +21 BM BX he would like under sedation. RN coordinator will get scheduled at [facility] [place].     2.  HEME:  - Pt/donor both Opos  - Transfuse for hgb <7g/dL; plt < 10k  - prolonged INR, likely xarelto SE  - Recent pulmonary emboli: He developed a pulmonary emboli in the setting of receiving PEG asparaginase. On xarelto will continue until plts <50K.  - pancytopenia secondary to chemotherapy. ANC up to 3.7 today.       3.  FEN/renal:  - lytes OK  - History of kidney cancer: He has a history of renal cancer and has a prior resection.  For that reason he has a single unilateral kidney.     4.  Dental clearance: Ok to proceed.    5.  GVHD: Tac/MMF   - tac level 12.6 8/18.  Now on PO tac and MMF. Tac level 7.7 yesterday, dose increased to 4mg BID    6.  ID:   - prophy acv/letermovir, fluconazole, levaquin   - sulfa allergic; pentamidine at day +28    9. Hx of graves disease; On synthroid    10. GI: Constipation. Resolved; stopped scheduled senna; miralax PRN.     11. CV: Hypertension. Likely secondary to tacrolimus. On norvasc 5mg.    Dispositon: planning on discharge tomorrow.  - RTC Monday, 8/23, 11 am lab folllowed by provider visit, possible infusion and pharmacy f/u.  - can likely start PO Mg    Lashanda Figueroa pa-c  [phone]

## 2021-08-22 NOTE — PLAN OF CARE
Problem: Adult Inpatient Plan of Care  Goal: Plan of Care Review  Outcome: Adequate for Discharge  Goal: Patient-Specific Goal (Individualized)  Outcome: Adequate for Discharge  Goal: Absence of Hospital-Acquired Illness or Injury  Outcome: Adequate for Discharge  Intervention: Identify and Manage Fall Risk  Recent Flowsheet Documentation  Taken 8/22/2021 0800 by Chelo Nicholas RN  Safety Promotion/Fall Prevention: assistive device/personal items within reach  Intervention: Prevent Skin Injury  Recent Flowsheet Documentation  Taken 8/22/2021 0800 by Chelo Nicholas RN  Body Position: position changed independently  Intervention: Prevent and Manage VTE (Venous Thromboembolism) Risk  Recent Flowsheet Documentation  Taken 8/22/2021 0800 by Chelo Nicholas RN  VTE Prevention/Management:   ambulation promoted   anticoagulant therapy maintained   compression stockings on  Goal: Optimal Comfort and Wellbeing  Outcome: Adequate for Discharge  Goal: Readiness for Transition of Care  Outcome: Adequate for Discharge  Afebrile. Vss. Complained of a sore back and declined pain medication. No nausea. No mouth sores and continues to use the normal saline rinses. Heparin locked both lines. No growth factor needed. BRIAN +1 and wearing maurizio hose. Discharged home and teaching was done. Instructed not to take the tacrolimus pill in the am. They will need to draw a level tomorrow at the BMT clinic. Pill box was filled and will bring tomorrow to the appointment.

## 2021-08-23 ENCOUNTER — HOME INFUSION (PRE-WILLOW HOME INFUSION) (OUTPATIENT)
Dept: PHARMACY | Facility: CLINIC | Age: 61
End: 2021-08-23

## 2021-08-23 ENCOUNTER — ONCOLOGY VISIT (OUTPATIENT)
Dept: TRANSPLANT | Facility: CLINIC | Age: 61
End: 2021-08-23
Attending: NURSE PRACTITIONER
Payer: COMMERCIAL

## 2021-08-23 ENCOUNTER — APPOINTMENT (OUTPATIENT)
Dept: LAB | Facility: CLINIC | Age: 61
End: 2021-08-23
Attending: NURSE PRACTITIONER
Payer: COMMERCIAL

## 2021-08-23 VITALS
RESPIRATION RATE: 18 BRPM | DIASTOLIC BLOOD PRESSURE: 65 MMHG | BODY MASS INDEX: 31.56 KG/M2 | HEIGHT: 72 IN | WEIGHT: 233 LBS | SYSTOLIC BLOOD PRESSURE: 130 MMHG | TEMPERATURE: 98.4 F | OXYGEN SATURATION: 98 %

## 2021-08-23 DIAGNOSIS — Z94.81 STATUS POST BONE MARROW TRANSPLANT (H): ICD-10-CM

## 2021-08-23 DIAGNOSIS — C91.01 ACUTE LYMPHOBLASTIC LEUKEMIA (ALL) IN REMISSION (H): Primary | ICD-10-CM

## 2021-08-23 DIAGNOSIS — Z11.59 ENCOUNTER FOR SCREENING FOR OTHER VIRAL DISEASES: ICD-10-CM

## 2021-08-23 DIAGNOSIS — C91.01 ACUTE LYMPHOBLASTIC LEUKEMIA (ALL) IN REMISSION (H): Primary | Chronic | ICD-10-CM

## 2021-08-23 PROCEDURE — 99215 OFFICE O/P EST HI 40 MIN: CPT

## 2021-08-23 PROCEDURE — G0463 HOSPITAL OUTPT CLINIC VISIT: HCPCS

## 2021-08-23 PROCEDURE — 250N000011 HC RX IP 250 OP 636: Performed by: NURSE PRACTITIONER

## 2021-08-23 PROCEDURE — 80197 ASSAY OF TACROLIMUS: CPT

## 2021-08-23 RX ORDER — HEPARIN SODIUM,PORCINE 10 UNIT/ML
5 VIAL (ML) INTRAVENOUS
Status: DISCONTINUED | OUTPATIENT
Start: 2021-08-23 | End: 2021-08-23 | Stop reason: HOSPADM

## 2021-08-23 RX ADMIN — Medication 5 ML: at 11:10

## 2021-08-23 RX ADMIN — Medication 5 ML: at 11:09

## 2021-08-23 ASSESSMENT — PAIN SCALES - GENERAL: PAINLEVEL: NO PAIN (0)

## 2021-08-23 ASSESSMENT — MIFFLIN-ST. JEOR: SCORE: 1889.88

## 2021-08-23 NOTE — NURSING NOTE
Oncology Rooming Note    August 23, 2021 11:39 AM   Nik Nava is a 61 year old male who presents for:    Chief Complaint   Patient presents with     Labs Only     cvc,, heparin locked, vitals checked     Oncology Clinic Visit     ALL (acute lymphoblastic leukemia) (H)     Initial Vitals: /65   Temp 98.4  F (36.9  C)   Resp 18   Ht 1.829 m (6')   Wt 105.7 kg (233 lb)   SpO2 98%   BMI 31.60 kg/m   Estimated body mass index is 31.6 kg/m  as calculated from the following:    Height as of this encounter: 1.829 m (6').    Weight as of this encounter: 105.7 kg (233 lb). Body surface area is 2.32 meters squared.  No Pain (0) Comment: Data Unavailable   No LMP for male patient.  Allergies reviewed: Yes  Medications reviewed: Yes - Meeting with pharmacist.   Medications: Medication refills not needed today.  Pharmacy name entered into [email or web address]: [facility] - [place], [place] - [id number] [address] [facility]    Clinical concerns: None       Melanie Arizmendi CMA

## 2021-08-23 NOTE — NURSING NOTE
Chief Complaint   Patient presents with     Labs Only     cvc,, heparin locked, vitals checked      Stephanie Seals RN on 8/23/2021 at 11:12 AM

## 2021-08-23 NOTE — PROGRESS NOTES
I reviewed the discharge medications, administration times and med box with Nik and his care give (RN) . The list was complete and accurate. The med box was filled correctly. I reviewed the Tacrolimus lab draws technique and target levels with them. I answered all there questions . They would like to be notified of tac values even if within therapeutic range. We also discussed why Letermovir should remain in its foil packaging until administration time. We also reviewed tacrolimus drug interactions.     Current Outpatient Medications   Medication Sig Dispense Refill     acetaminophen (TYLENOL) 500 MG tablet Take 500-1,000 mg by mouth every 6 hours as needed for mild pain        acyclovir (ZOVIRAX) 800 MG tablet 800mg 5x/day on 8/22, 8/23 & 8/24    Starting on 8/25 take 800mg twice a day 69 tablet 1     amLODIPine (NORVASC) 5 MG tablet Take 1 tablet (5 mg) by mouth daily 30 tablet 1     fluconazole (DIFLUCAN) 200 MG tablet Take 1 tablet (200 mg) by mouth daily 30 tablet 1     [START ON 8/24/2021] letermovir (PREVYMIS) 480 MG TABS tablet Take 1 tablet (480 mg) by mouth daily Start on 8/24 30 tablet 3     levofloxacin (LEVAQUIN) 250 MG tablet Take 1 tablet (250 mg) by mouth daily 14 tablet 0     levothyroxine (SYNTHROID/LEVOTHROID) 200 MCG tablet Take 200 mcg by mouth daily        loratadine (CLARITIN) 10 MG tablet Take 1 tablet (10 mg) by mouth daily 10 tablet 0     LORazepam (ATIVAN) 1 MG tablet Take 1 mg by mouth nightly as needed for anxiety or sleep        melatonin 5 MG CAPS Take 5 mg by mouth At Bedtime        mycophenolate (GENERIC EQUIVALENT) 500 MG tablet Take 3 tablets (1,500 mg) by mouth every 12 hours 72 tablet 0     nicotine polacrilex (NICORETTE) 4 MG gum Take 4 mg by mouth as needed for smoking cessation        Nutritional Supplements (ENSURE HIGH PROTEIN) Take 1 Can by mouth 2 times daily        omeprazole (PRILOSEC) 40 MG DR capsule Take 40 mg by mouth daily       ondansetron (ZOFRAN) 8 MG tablet  ondansetron HCl 8 mg tablet (Patient not taking: No sig reported)       polyethylene glycol (MIRALAX) 17 GM/Dose powder Take 17 g by mouth daily as needed for constipation        prochlorperazine (COMPAZINE) 10 MG tablet Take 0.5 tablets (5 mg) by mouth every 6 hours as needed for nausea or vomiting       senna (SENOKOT) 8.6 MG tablet Take 17.2 mg by mouth        tacrolimus (GENERIC EQUIVALENT) 0.5 MG capsule Take 1 capsule (0.5 mg) by mouth every 12 hours Take along with 3mg to equal 3.5mg every 12 hours 60 capsule 1     tacrolimus (GENERIC EQUIVALENT) 1 MG capsule Take 4 capsules (4 mg) by mouth 2 times daily  180 capsule 1     ursodiol (ACTIGALL) 300 MG capsule Take 1 capsule (300 mg) by mouth 3 times daily 90 capsule 1     XARELTO ANTICOAGULANT 20 MG TABS tablet Take 20 mg by mouth daily           Pertinent labs considered today:  Lab Results   Component Value Date     08/23/2021     07/08/2021                 normal sodium 136-148  Lab Results   Component Value Date    POTASSIUM 4.6 08/23/2021    POTASSIUM 3.9 07/08/2021       normal potassium 3.5-5.2   Lab Results   Component Value Date    CHLORIDE 107 08/23/2021    CHLORIDE 108 07/08/2021           normal chloride    Lab Results   Component Value Date    CO2 24 08/23/2021    CO2 23 07/08/2021                normal CO2 20-32  Lab Results   Component Value Date    BUN 30 08/23/2021    BUN 24 07/08/2021                 normal BUN 5-24  Lab Results   Component Value Date     08/23/2021     07/08/2021                 normal glucose   Lab Results   Component Value Date    CR 1.18 08/23/2021    CR 0.94 07/08/2021                 normal cr 0.8-1.5  Lab Results   Component Value Date    DAMON 9.6 08/23/2021    DAMON 8.9 07/08/2021               normal calcium  8.5-10.4  Lab Results   Component Value Date    BILITOTAL 0.4 08/19/2021    BILITOTAL 0.3 07/08/2021          normal bilirubin 0.2-1.3  Lab Results   Component Value Date     PROTTOTAL 6.4 08/19/2021    PROTTOTAL 7.3 07/08/2021          normal total protein 6.0-8.2  Lab Results   Component Value Date    ALBUMIN 3.2 08/19/2021    ALBUMIN 3.7 07/08/2021             normal albumin 3.2-4.5  Lab Results   Component Value Date    ALKPHOS 57 08/19/2021    ALKPHOS 60 07/08/2021            normal alkphos   Lab Results   Component Value Date    ALT 17 08/19/2021    ALT 28 07/08/2021         normal ALT 0-65  Lab Results   Component Value Date    AST 11 08/19/2021    AST 22 07/08/2021         normal AST 0-37    Lab Results   Component Value Date    HGB 9.2 08/23/2021    HGB 8.5 07/08/2021     Lab Results   Component Value Date    WBC 4.6 08/23/2021    WBC 8.9 07/08/2021      Lab Results   Component Value Date    PLT 89 08/23/2021     07/08/2021       Estimated Creatinine Clearance: 80.5 mL/min (based on SCr of 1.18 mg/dL).         Time spent in this activity: 15 minutes       Ceasar Altman RP, PharmD

## 2021-08-23 NOTE — PLAN OF CARE
Occupational Therapy Discharge Summary    Reason for therapy discharge:    Discharged to home.    Progress towards therapy goal(s). See goals on Care Plan in [facility] electronic health record for goal details.  Goals met    Therapy recommendation(s):    Continue home exercise program.

## 2021-08-23 NOTE — LETTER
8/23/2021         RE: Nik Nava  [address] [facility] [address]        Dear Colleague,    Thank you for referring your patient, Nik Nava, to the [facility] BLOOD AND MARROW TRANSPLANT PROGRAM [place]. Please see a copy of my visit note below.    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  Eating with no N/V/D.  Afebrile with no cough or SOB.  No pain, bleeding or rash  Review of Systems                                                                                                                                         Review of systems was negative other than noted above.      PHYSICAL EXAM                                                                                                                                                      Blood pressure 130/65, temperature 98.4  F (36.9  C), resp. rate 18, height 1.829 m (6'), weight 105.7 kg (233 lb), SpO2 98 %.  KPS: 90  General: NAD   HEENT: sclera anicteric.  OP clear  Lungs:  lungs clear  Heart: rrr, no m/r/g  Abd:  SNTND.  + BS  Skin: No rashes or petechaie  Neuro: A&O, speech normal  EXT:  Compression socks in place  Vascular Access: port in the right chest, left double lumen blake not tender     Labs:  Lab Results   Component Value Date    WBC 4.6 08/23/2021    ANEU 3.7 08/22/2021    HGB 9.2 (L) 08/23/2021    HCT 28.2 (L) 08/23/2021    PLT 89 (L) 08/23/2021     08/23/2021    POTASSIUM 4.6 08/23/2021    CHLORIDE 107 08/23/2021    CO2 24 08/23/2021     (H) 08/23/2021    BUN 30 08/23/2021    CR 1.18 08/23/2021    MAG 1.7 08/23/2021    INR 1.32 (H) 08/16/2021      OVERALL ASSESSMENT AND PLAN   Nik Nava is a 61 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  Here for flu/cy/TBI and sib allo sct  HCT-CI score: 3 (prior solid tumor)  - day +21 BM BX under sedation. RN coordinator will get scheduled at [facility] [place].      2.  HEME:  - Pt/donor both Opos  - Transfuse for hgb <7g/dL; plt < 10k.  No transfusions today  - Recent pulmonary emboli: He developed a pulmonary emboli in the setting of receiving PEG asparaginase. On xarelto will continue until plts <50K.     3.  FEN/renal:  - lytes OK  - He has a history of renal cancer and resection.  For that reason he has a single unilateral kidney.      4.    GVHD: Tac/MMF   - On tac 4mg BID, dose increased for level of 7.7 on 8/22.  Repeat level pending todayt     6.  ID:   - prophy acv/letermovir, fluconazole, levaquin   - sulfa allergic; pentamidine at day +28  - CMV negative 8/18     9. Hx of graves disease; On synthroid     10. GI:   - Prilosec for reflux     11. CV: Hypertension. Likely secondary to tacrolimus. On norvasc 5mg.     RTC daily    Jailyn Ny  I spent 40 minutes in the care of this patient today, which included time necessary for preparation for the visit, obtaining history, ordering medications/tests/procedures as medically indicated, review of pertinent medical literature, counseling of the patient, communication of recommendations to the care team, and documentation time.      Again, thank you for allowing me to participate in the care of your patient.        Sincerely,        BMT Advanced Practice Provider

## 2021-08-23 NOTE — PROGRESS NOTES
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  Eating with no N/V/D.  Afebrile with no cough or SOB.  No pain, bleeding or rash  Review of Systems                                                                                                                                         Review of systems was negative other than noted above.      PHYSICAL EXAM                                                                                                                                                      Blood pressure 130/65, temperature 98.4  F (36.9  C), resp. rate 18, height 1.829 m (6'), weight 105.7 kg (233 lb), SpO2 98 %.  KPS: 90  General: NAD   HEENT: sclera anicteric.  OP clear  Lungs:  lungs clear  Heart: rrr, no m/r/g  Abd:  SNTND.  + BS  Skin: No rashes or petechaie  Neuro: A&O, speech normal  EXT:  Compression socks in place  Vascular Access: port in the right chest, left double lumen blake not tender     Labs:  Lab Results   Component Value Date    WBC 4.6 08/23/2021    ANEU 3.7 08/22/2021    HGB 9.2 (L) 08/23/2021    HCT 28.2 (L) 08/23/2021    PLT 89 (L) 08/23/2021     08/23/2021    POTASSIUM 4.6 08/23/2021    CHLORIDE 107 08/23/2021    CO2 24 08/23/2021     (H) 08/23/2021    BUN 30 08/23/2021    CR 1.18 08/23/2021    MAG 1.7 08/23/2021    INR 1.32 (H) 08/16/2021      OVERALL ASSESSMENT AND PLAN   Nki Nava is a 61 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, 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  Here for flu/cy/TBI and sib allo sct  HCT-CI score: 3 (prior solid tumor)  - day +21 BM BX under sedation. [place] [facility]      2.  HEME:  - Pt/donor both Opos  - Transfuse  for hgb <7g/dL; plt < 10k.  No transfusions today  - Recent pulmonary emboli: He developed a pulmonary emboli in the setting of receiving PEG asparaginase. On xarelto will continue until plts <50K.     3.  FEN/renal:  - lytes OK  - He has a history of renal cancer and resection.  For that reason he has a single unilateral kidney.      4.    GVHD: Tac/MMF   - On tac 4mg BID, dose increased for level of 7.7 on 8/22.  Repeat level pending todayt     6.  ID:   - prophy acv/letermovir, fluconazole, levaquin   - sulfa allergic; pentamidine at day +28  - CMV negative 8/18     9. Hx of graves disease; On synthroid     10. GI:   - Prilosec for reflux     11. CV: Hypertension. Likely secondary to tacrolimus. On norvasc 5mg.     RTC daily    Jailyn Ny I spent 40 minutes in the care of this patient today, which included time necessary for preparation for the visit, obtaining history, ordering medications/tests/procedures as medically indicated, review of pertinent medical literature, counseling of the patient, communication of recommendations to the care team, and documentation time.

## 2021-08-24 ENCOUNTER — APPOINTMENT (OUTPATIENT)
Dept: LAB | Facility: CLINIC | Age: 61
End: 2021-08-24
Attending: INTERNAL MEDICINE
Payer: COMMERCIAL

## 2021-08-24 ENCOUNTER — ONCOLOGY VISIT (OUTPATIENT)
Dept: TRANSPLANT | Facility: CLINIC | Age: 61
End: 2021-08-24
Attending: INTERNAL MEDICINE
Payer: COMMERCIAL

## 2021-08-24 VITALS
TEMPERATURE: 98.1 F | HEIGHT: 72 IN | BODY MASS INDEX: 31.38 KG/M2 | SYSTOLIC BLOOD PRESSURE: 147 MMHG | DIASTOLIC BLOOD PRESSURE: 88 MMHG | RESPIRATION RATE: 16 BRPM | HEART RATE: 104 BPM | OXYGEN SATURATION: 99 % | WEIGHT: 231.7 LBS

## 2021-08-24 DIAGNOSIS — C91.01 ACUTE LYMPHOBLASTIC LEUKEMIA (ALL) IN REMISSION (H): ICD-10-CM

## 2021-08-24 LAB
ANION GAP SERPL CALCULATED.3IONS-SCNC: 6 MMOL/L (ref 3–14)
BASOPHILS # BLD MANUAL: 0 10E3/UL (ref 0–0.2)
BASOPHILS NFR BLD MANUAL: 0 %
BUN SERPL-MCNC: 32 MG/DL (ref 7–30)
CALCIUM SERPL-MCNC: 9.8 MG/DL (ref 8.5–10.1)
CHLORIDE BLD-SCNC: 108 MMOL/L (ref 94–109)
CO2 SERPL-SCNC: 24 MMOL/L (ref 20–32)
CREAT SERPL-MCNC: 1.2 MG/DL (ref 0.66–1.25)
EOSINOPHIL # BLD MANUAL: 0 10E3/UL (ref 0–0.7)
EOSINOPHIL NFR BLD MANUAL: 0 %
ERYTHROCYTE [DISTWIDTH] IN BLOOD BY AUTOMATED COUNT: 14.3 % (ref 10–15)
GFR SERPL CREATININE-BSD FRML MDRD: 64 ML/MIN/1.73M2
GLUCOSE BLD-MCNC: 94 MG/DL (ref 70–99)
HCT VFR BLD AUTO: 27.8 % (ref 40–53)
HGB BLD-MCNC: 9.4 G/DL (ref 13.3–17.7)
LYMPHOCYTES # BLD MANUAL: 0.7 10E3/UL (ref 0.8–5.3)
LYMPHOCYTES NFR BLD MANUAL: 19 %
MAGNESIUM SERPL-MCNC: 1.3 MG/DL (ref 1.6–2.3)
MCH RBC QN AUTO: 33.6 PG (ref 26.5–33)
MCHC RBC AUTO-ENTMCNC: 33.8 G/DL (ref 31.5–36.5)
MCV RBC AUTO: 99 FL (ref 78–100)
MONOCYTES # BLD MANUAL: 0.5 10E3/UL (ref 0–1.3)
MONOCYTES NFR BLD MANUAL: 13 %
NEUTROPHILS # BLD MANUAL: 2.4 10E3/UL (ref 1.6–8.3)
NEUTROPHILS NFR BLD MANUAL: 68 %
PLAT MORPH BLD: ABNORMAL
PLATELET # BLD AUTO: 102 10E3/UL (ref 150–450)
POTASSIUM BLD-SCNC: 4.5 MMOL/L (ref 3.4–5.3)
RBC # BLD AUTO: 2.8 10E6/UL (ref 4.4–5.9)
RBC MORPH BLD: ABNORMAL
SODIUM SERPL-SCNC: 138 MMOL/L (ref 133–144)
TACROLIMUS BLD-MCNC: >120 UG/L (ref 5–15)
TME LAST DOSE: ABNORMAL H
TME LAST DOSE: ABNORMAL H
WBC # BLD AUTO: 3.5 10E3/UL (ref 4–11)

## 2021-08-24 PROCEDURE — 85027 COMPLETE CBC AUTOMATED: CPT

## 2021-08-24 PROCEDURE — 36592 COLLECT BLOOD FROM PICC: CPT

## 2021-08-24 PROCEDURE — 99214 OFFICE O/P EST MOD 30 MIN: CPT

## 2021-08-24 PROCEDURE — 80048 BASIC METABOLIC PNL TOTAL CA: CPT

## 2021-08-24 PROCEDURE — G0463 HOSPITAL OUTPT CLINIC VISIT: HCPCS

## 2021-08-24 PROCEDURE — 250N000011 HC RX IP 250 OP 636: Performed by: INTERNAL MEDICINE

## 2021-08-24 PROCEDURE — 83735 ASSAY OF MAGNESIUM: CPT

## 2021-08-24 RX ORDER — HEPARIN SODIUM,PORCINE 10 UNIT/ML
5 VIAL (ML) INTRAVENOUS ONCE
Status: COMPLETED | OUTPATIENT
Start: 2021-08-24 | End: 2021-08-24

## 2021-08-24 RX ORDER — AMLODIPINE BESYLATE 5 MG/1
7.5 TABLET ORAL DAILY
Qty: 30 TABLET | Refills: 1 | COMMUNITY
Start: 2021-08-24 | End: 2021-08-25

## 2021-08-24 RX ORDER — MAGNESIUM OXIDE 400 MG/1
400 TABLET ORAL 2 TIMES DAILY
Qty: 60 TABLET | Refills: 1 | Status: SHIPPED | OUTPATIENT
Start: 2021-08-24 | End: 2021-08-25

## 2021-08-24 RX ADMIN — Medication 5 ML: at 12:21

## 2021-08-24 RX ADMIN — Medication 5 ML: at 12:20

## 2021-08-24 ASSESSMENT — MIFFLIN-ST. JEOR: SCORE: 1884.11

## 2021-08-24 ASSESSMENT — PAIN SCALES - GENERAL: PAINLEVEL: MILD PAIN (2)

## 2021-08-24 NOTE — PROGRESS NOTES
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 Took a 3hour nap yesterday. Eating/drinking well. No n/v/d/c. Notes intermittent pain in b/l lower abdomen. Admits to being gassy but unsure if that's the cause. No fevers. No cough or respiratory symptoms. No rashes.      Review of Systems                                                                                                                                         8pt Review of systems was negative other than noted above.      PHYSICAL EXAM                                                                                                                                                      Blood pressure (!) 147/88, pulse 104, temperature 98.1  F (36.7  C), temperature source Oral, resp. rate 16, weight 105.1 kg (231 lb 11.2 oz), SpO2 99 %.  KPS: 90  General: NAD   HEENT: sclera anicteric.  OP clear  Lungs:  lungs clear  Heart: rrr, no m/r/g  Abd:  SNTND.  + BS. No tenderness with palpation  Skin: No rashes or petechaie  Neuro: A&O, speech normal  EXT:  Compression socks in place  Vascular Access: port in the right chest, left double lumen blake not tender     Labs:  Lab Results   Component Value Date    WBC 3.5 (L) 08/24/2021    ANEU 3.4 08/23/2021    HGB 9.4 (L) 08/24/2021    HCT 27.8 (L) 08/24/2021     (L) 08/24/2021     08/24/2021    POTASSIUM 4.5 08/24/2021    CHLORIDE 108 08/24/2021    CO2 24 08/24/2021    GLC 94 08/24/2021    BUN 32 (H) 08/24/2021    CR 1.20 08/24/2021    MAG 1.3 (L) 08/24/2021    INR 1.32 (H) 08/16/2021      OVERALL ASSESSMENT AND PLAN   Nik Nava is a 61 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  Acute lymphoblastic leukemia, [place] chromosome  positive in CR, MRD neg.  Here for flu/cy/TBI and sib allo sct  HCT-CI score: 3 (prior solid tumor)  - day +21 BM BX under sedation. RN coordinator will get scheduled at [facility].      2.  HEME:  - Pt/donor both Opos  - Transfuse for hgb <7g/dL; plt < 10k.  No transfusions today  - Recent pulmonary emboli: He developed a pulmonary emboli in the setting of receiving PEG asparaginase. On xarelto will continue unless plts <50K.     3.  FEN/renal:  - mg 1.3. Start mg ox 400mg BID. Instructed to take both doses today.  - He has a history of renal cancer and resection.  For that reason he has a single unilateral kidney.      4.    GVHD: Tac/MMF   - On tac 4mg BID, dose increased for level of 7.7 on 8/22.  Repeat level >120, recheck tomorrow (already took this morning).     6.  ID:   - prophy acv/letermovir, fluconazole, levaquin (will stop in a few days if WBC doesn't drop)   - sulfa allergic; pentamidine at day +28  - CMV negative 8/18     9. Hx of graves disease; On synthroid     10. GI:   - Prilosec for reflux     11. CV: Hypertension. Likely secondary to tacrolimus. Inc norvasc 7.5mg.     RETURN TO CLINIC tomorrow. Add possible infusion appt for Mg    aPtito Mims NP      I spent 40 minutes in the care of this patient today, which included time necessary for preparation for the visit, obtaining history, ordering medications/tests/procedures as medically indicated, review of pertinent medical literature, counseling of the patient, communication of recommendations to the care team, and documentation time.

## 2021-08-24 NOTE — NURSING NOTE
Chief Complaint   Patient presents with     Blood Draw     Labs drawn from CVC by RN in lab. Vitals taken. Checked into next appointment.      CVC accessed by RN in lab, labs drawn.  Both lines flushed with heparin.    Vital signs taken.  Pt checked in to next appointment.     Brandy Ward RN

## 2021-08-24 NOTE — NURSING NOTE
"Oncology Rooming Note    August 24, 2021 12:46 PM   Nik Nava is a 61 year old male who presents for:    Chief Complaint   Patient presents with     Blood Draw     Labs drawn from CVC by RN in lab. Vitals taken. Checked into next appointment.      Oncology Clinic Visit     Acute lymphoblastic leukemia      Initial Vitals: BP (!) 147/88 (BP Location: Right arm, Patient Position: Sitting, Cuff Size: Adult Large)   Pulse 104   Temp 98.1  F (36.7  C) (Oral)   Resp 16   Ht 1.829 m (6' 0.01\")   Wt 105.1 kg (231 lb 11.2 oz)   SpO2 99%   BMI 31.42 kg/m   Estimated body mass index is 31.42 kg/m  as calculated from the following:    Height as of this encounter: 1.829 m (6' 0.01\").    Weight as of this encounter: 105.1 kg (231 lb 11.2 oz). Body surface area is 2.31 meters squared.  Mild Pain (2) Comment: Data Unavailable   No LMP for male patient.  Allergies reviewed: Yes  Medications reviewed: Yes    Medications: Medication refills not needed today.  Pharmacy name entered into Trip[facility]: [facility] - [place], [place] - [address] [facility]    Clinical concerns: Pain in lower abdominal region bilateral Patito was notified.      Gary Hooks MA            "

## 2021-08-24 NOTE — LETTER
8/24/2021         RE: Nik Nava  [address] [facility] [address]        Dear Colleague,    Thank you for referring your patient, Nik Nava, to the [facility] BLOOD AND MARROW TRANSPLANT PROGRAM [place]. Please see a copy of my visit note below.    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 Took a 3hour nap yesterday. Eating/drinking well. No n/v/d/c. Notes intermittent pain in b/l lower abdomen. Admits to being gassy but unsure if that's the cause. No fevers. No cough or respiratory symptoms. No rashes.      Review of Systems                                                                                                                                         8pt Review of systems was negative other than noted above.      PHYSICAL EXAM                                                                                                                                                      Blood pressure (!) 147/88, pulse 104, temperature 98.1  F (36.7  C), temperature source Oral, resp. rate 16, weight 105.1 kg (231 lb 11.2 oz), SpO2 99 %.  KPS: 90  General: NAD   HEENT: sclera anicteric.  OP clear  Lungs:  lungs clear  Heart: rrr, no m/r/g  Abd:  SNTND.  + BS. No tenderness with palpation  Skin: No rashes or petechaie  Neuro: A&O, speech normal  EXT:  Compression socks in place  Vascular Access: port in the right chest, left double lumen blake not tender     Labs:  Lab Results   Component Value Date    WBC 3.5 (L) 08/24/2021    ANEU 3.4 08/23/2021    HGB 9.4 (L) 08/24/2021    HCT 27.8 (L) 08/24/2021     (L) 08/24/2021     08/24/2021    POTASSIUM 4.5 08/24/2021    CHLORIDE 108 08/24/2021    CO2 24 08/24/2021    GLC 94 08/24/2021    BUN 32 (H) 08/24/2021    CR 1.20 08/24/2021    MAG 1.3 (L) 08/24/2021    INR 1.32 (H) 08/16/2021      OVERALL ASSESSMENT  AND PLAN   Nik Nava is a 61 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  Here for flu/cy/TBI and sib allo sct  HCT-CI score: 3 (prior solid tumor)  - day +21 BM BX under sedation. RN coordinator will get scheduled at [facility].      2.  HEME:  - Pt/donor both Opos  - Transfuse for hgb <7g/dL; plt < 10k.  No transfusions today  - Recent pulmonary emboli: He developed a pulmonary emboli in the setting of receiving PEG asparaginase. On xarelto will continue unless plts <50K.     3.  FEN/renal:  - mg 1.3. Start mg ox 400mg BID. Instructed to take both doses today.  - He has a history of renal cancer and resection.  For that reason he has a single unilateral kidney.      4.    GVHD: Tac/MMF   - On tac 4mg BID, dose increased for level of 7.7 on 8/22.  Repeat level >120, recheck tomorrow (already took this morning).     6.  ID:   - prophy acv/letermovir, fluconazole, levaquin (will stop in a few days if WBC doesn't drop)   - sulfa allergic; pentamidine at day +28  - CMV negative 8/18     9. Hx of graves disease; On synthroid     10. GI:   - Prilosec for reflux     11. CV: Hypertension. Likely secondary to tacrolimus. Inc norvasc 7.5mg.     RETURN TO CLINIC tomorrow. Add possible infusion appt for Mg    Patito Mims NP      I spent 40 minutes in the care of this patient today, which included time necessary for preparation for the visit, obtaining history, ordering medications/tests/procedures as medically indicated, review of pertinent medical literature, counseling of the patient, communication of recommendations to the care team, and documentation time.      Again, thank you for allowing me to participate in the care of your patient.        Sincerely,        BMT Advanced Practice Provider

## 2021-08-25 ENCOUNTER — CARE COORDINATION (OUTPATIENT)
Dept: TRANSPLANT | Facility: CLINIC | Age: 61
End: 2021-08-25

## 2021-08-25 ENCOUNTER — APPOINTMENT (OUTPATIENT)
Dept: EDUCATION SERVICES | Facility: CLINIC | Age: 61
End: 2021-08-25
Attending: INTERNAL MEDICINE
Payer: COMMERCIAL

## 2021-08-25 ENCOUNTER — APPOINTMENT (OUTPATIENT)
Dept: LAB | Facility: CLINIC | Age: 61
End: 2021-08-25
Attending: INTERNAL MEDICINE
Payer: COMMERCIAL

## 2021-08-25 ENCOUNTER — INFUSION THERAPY VISIT (OUTPATIENT)
Dept: TRANSPLANT | Facility: CLINIC | Age: 61
End: 2021-08-25
Attending: INTERNAL MEDICINE
Payer: COMMERCIAL

## 2021-08-25 VITALS
SYSTOLIC BLOOD PRESSURE: 129 MMHG | TEMPERATURE: 98 F | DIASTOLIC BLOOD PRESSURE: 80 MMHG | HEART RATE: 93 BPM | WEIGHT: 231.2 LBS | OXYGEN SATURATION: 99 % | RESPIRATION RATE: 16 BRPM | BODY MASS INDEX: 31.35 KG/M2

## 2021-08-25 DIAGNOSIS — Z94.81 STATUS POST BONE MARROW TRANSPLANT (H): Primary | ICD-10-CM

## 2021-08-25 DIAGNOSIS — C91.01 ACUTE LYMPHOBLASTIC LEUKEMIA (ALL) IN REMISSION (H): ICD-10-CM

## 2021-08-25 LAB
ANION GAP SERPL CALCULATED.3IONS-SCNC: 11 MMOL/L (ref 3–14)
BASOPHILS # BLD MANUAL: 0 10E3/UL (ref 0–0.2)
BASOPHILS NFR BLD MANUAL: 0 %
BUN SERPL-MCNC: 34 MG/DL (ref 7–30)
CALCIUM SERPL-MCNC: 10.1 MG/DL (ref 8.5–10.1)
CHLORIDE BLD-SCNC: 103 MMOL/L (ref 94–109)
CO2 SERPL-SCNC: 26 MMOL/L (ref 20–32)
CREAT SERPL-MCNC: 1.17 MG/DL (ref 0.66–1.25)
EOSINOPHIL # BLD MANUAL: 0 10E3/UL (ref 0–0.7)
EOSINOPHIL NFR BLD MANUAL: 0 %
ERYTHROCYTE [DISTWIDTH] IN BLOOD BY AUTOMATED COUNT: 14.2 % (ref 10–15)
GFR SERPL CREATININE-BSD FRML MDRD: 66 ML/MIN/1.73M2
GLUCOSE BLD-MCNC: 110 MG/DL (ref 70–99)
HCT VFR BLD AUTO: 28.4 % (ref 40–53)
HGB BLD-MCNC: 9.6 G/DL (ref 13.3–17.7)
LYMPHOCYTES # BLD MANUAL: 0.4 10E3/UL (ref 0.8–5.3)
LYMPHOCYTES NFR BLD MANUAL: 15 %
MAGNESIUM SERPL-MCNC: 1.3 MG/DL (ref 1.6–2.3)
MCH RBC QN AUTO: 33 PG (ref 26.5–33)
MCHC RBC AUTO-ENTMCNC: 33.8 G/DL (ref 31.5–36.5)
MCV RBC AUTO: 98 FL (ref 78–100)
MONOCYTES # BLD MANUAL: 0.8 10E3/UL (ref 0–1.3)
MONOCYTES NFR BLD MANUAL: 29 %
NEUTROPHILS # BLD MANUAL: 1.6 10E3/UL (ref 1.6–8.3)
NEUTROPHILS NFR BLD MANUAL: 56 %
PLAT MORPH BLD: ABNORMAL
PLATELET # BLD AUTO: 123 10E3/UL (ref 150–450)
POTASSIUM BLD-SCNC: 4.9 MMOL/L (ref 3.4–5.3)
RBC # BLD AUTO: 2.91 10E6/UL (ref 4.4–5.9)
RBC MORPH BLD: ABNORMAL
SODIUM SERPL-SCNC: 140 MMOL/L (ref 133–144)
TACROLIMUS BLD-MCNC: 7.3 UG/L (ref 5–15)
TACROLIMUS BLD-MCNC: 7.9 UG/L (ref 5–15)
TME LAST DOSE: NORMAL H
WBC # BLD AUTO: 2.9 10E3/UL (ref 4–11)

## 2021-08-25 PROCEDURE — 250N000011 HC RX IP 250 OP 636: Performed by: INTERNAL MEDICINE

## 2021-08-25 PROCEDURE — 80197 ASSAY OF TACROLIMUS: CPT

## 2021-08-25 PROCEDURE — 85027 COMPLETE CBC AUTOMATED: CPT

## 2021-08-25 PROCEDURE — 96365 THER/PROPH/DIAG IV INF INIT: CPT

## 2021-08-25 PROCEDURE — 36592 COLLECT BLOOD FROM PICC: CPT

## 2021-08-25 PROCEDURE — 80048 BASIC METABOLIC PNL TOTAL CA: CPT

## 2021-08-25 PROCEDURE — 83735 ASSAY OF MAGNESIUM: CPT

## 2021-08-25 PROCEDURE — G0463 HOSPITAL OUTPT CLINIC VISIT: HCPCS | Mod: 25

## 2021-08-25 PROCEDURE — 36415 COLL VENOUS BLD VENIPUNCTURE: CPT

## 2021-08-25 PROCEDURE — 99215 OFFICE O/P EST HI 40 MIN: CPT

## 2021-08-25 RX ORDER — ACYCLOVIR 800 MG/1
800 TABLET ORAL 2 TIMES DAILY
COMMUNITY
Start: 2021-08-25 | End: 2021-09-14

## 2021-08-25 RX ORDER — MAGNESIUM OXIDE 400 MG/1
TABLET ORAL
COMMUNITY
Start: 2021-08-25 | End: 2021-08-28

## 2021-08-25 RX ORDER — ACYCLOVIR 800 MG/1
800 TABLET ORAL 2 TIMES DAILY
COMMUNITY
Start: 2021-08-25 | End: 2021-08-25

## 2021-08-25 RX ORDER — TACROLIMUS 0.5 MG/1
CAPSULE ORAL
COMMUNITY
Start: 2021-08-25 | End: 2021-09-14

## 2021-08-25 RX ORDER — HEPARIN SODIUM,PORCINE 10 UNIT/ML
5 VIAL (ML) INTRAVENOUS ONCE
Status: COMPLETED | OUTPATIENT
Start: 2021-08-25 | End: 2021-08-25

## 2021-08-25 RX ORDER — TACROLIMUS 1 MG/1
4 CAPSULE ORAL 2 TIMES DAILY
COMMUNITY
Start: 2021-08-25 | End: 2021-08-30

## 2021-08-25 RX ORDER — AMLODIPINE BESYLATE 5 MG/1
7.5 TABLET ORAL DAILY
COMMUNITY
Start: 2021-08-25 | End: 2021-08-30

## 2021-08-25 RX ORDER — MAGNESIUM SULFATE HEPTAHYDRATE 40 MG/ML
2 INJECTION, SOLUTION INTRAVENOUS
Status: COMPLETED | OUTPATIENT
Start: 2021-08-25 | End: 2021-08-25

## 2021-08-25 RX ADMIN — Medication 5 ML: at 09:02

## 2021-08-25 RX ADMIN — MAGNESIUM SULFATE HEPTAHYDRATE 2 G: 40 INJECTION, SOLUTION INTRAVENOUS at 10:00

## 2021-08-25 RX ADMIN — Medication 5 ML: at 09:03

## 2021-08-25 ASSESSMENT — PAIN SCALES - GENERAL: PAINLEVEL: NO PAIN (0)

## 2021-08-25 NOTE — LETTER
8/25/2021         RE: Nik Nava  [address] [facility] [address]        Dear Colleague,    Thank you for referring your patient, Nik Naav, to the [facility] BLOOD AND MARROW TRANSPLANT PROGRAM [place]. Please see a copy of my visit note below.    Infusion Nursing Note:  Nik Nava presents today for IV magnesium infusion.    Patient seen by provider today: Yes: Lorrie Yap   present during visit today: Not Applicable.    Note: Pt here for add on IV Mag, Day +15 s/p BMT. Labs were monitored, no other transfusion needed. CVC dressing changed      Intravenous Access:  Mcknight.    Treatment Conditions:  Lab Results   Component Value Date    HGB 9.6 08/25/2021    HGB 8.5 07/08/2021     Lab Results   Component Value Date    WBC 2.9 08/25/2021    WBC 8.9 07/08/2021      Lab Results   Component Value Date    ANEU 1.6 08/25/2021    ANEU 7.2 07/08/2021     Lab Results   Component Value Date     08/25/2021     07/08/2021      Lab Results   Component Value Date     08/25/2021     07/08/2021                   Lab Results   Component Value Date    POTASSIUM 4.9 08/25/2021    POTASSIUM 3.9 07/08/2021           Lab Results   Component Value Date    MAG 1.3 08/25/2021            Lab Results   Component Value Date    CR 1.17 08/25/2021    CR 0.94 07/08/2021                   Lab Results   Component Value Date    DAMON 10.1 08/25/2021    DAOMN 8.9 07/08/2021                Lab Results   Component Value Date    BILITOTAL 0.4 08/19/2021    BILITOTAL 0.3 07/08/2021           Lab Results   Component Value Date    ALBUMIN 3.2 08/19/2021    ALBUMIN 3.7 07/08/2021                    Lab Results   Component Value Date    ALT 17 08/19/2021    ALT 28 07/08/2021           Lab Results   Component Value Date    AST 11 08/19/2021    AST 22 07/08/2021       Results reviewed, labs MET treatment parameters, ok to proceed with treatment.  Results reviewed, labs did NOT meet  treatment parameters: .      Post Infusion Assessment:  Patient tolerated infusion without incident.  Site patent and intact, free from redness, edema or discomfort.       Discharge Plan:   Patient discharged in stable condition accompanied by: wife.  Departure Mode: Ambulatory.      Catherine Mahan RN                          Again, thank you for allowing me to participate in the care of your patient.        Sincerely,        [facility]

## 2021-08-25 NOTE — PROGRESS NOTES
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 Notes intermittent pain in right lower abdomen, this is chronic and stable. . No fevers. No cough or sob. No rash.  Some fatigue.    Review of Systems                                                                                                                                         8pt Review of systems was negative other than noted above.      PHYSICAL EXAM                                                                                                                                                 KPS: 90  Vital Signs 8/25/2021   Systolic 129   Diastolic 80   Pulse 93   Temperature 98   Respirations 16   Weight (LB) 231 lb 3.2 oz   Height    BMI (Calculated)    Pain    O2 99     General: NAD   HEENT: sclera anicteric.  OP clear  Lungs:  lungs clear  Heart: rrr, no m/r/g  Abd:  SNTND.  + BS. No tenderness with palpation  Skin: No rashes or petechaie  Neuro: A&O, speech normal  EXT:  Compression socks in place  Vascular Access: port in the right chest, left double lumen blake not tender     KPS=90  Current aGVHD staging:  Skin 0, UGI 0, LGI 0, Liver 0 (8/25/2021)    Labs:  Lab Results   Component Value Date    WBC 2.9 (L) 08/25/2021    ANEU 1.6 08/25/2021    HGB 9.6 (L) 08/25/2021    HCT 28.4 (L) 08/25/2021     (L) 08/25/2021     08/25/2021    POTASSIUM 4.9 08/25/2021    CHLORIDE 103 08/25/2021    CO2 26 08/25/2021     (H) 08/25/2021    BUN 34 (H) 08/25/2021    CR 1.17 08/25/2021    MAG 1.3 (L) 08/25/2021    INR 1.32 (H) 08/16/2021      OVERALL ASSESSMENT AND PLAN   Nik Nava is a 61 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, day +15 s/p sib allo stem cell transplant    Day -6 (8/4): flu/cy  Day -5 through day -2 (8/5-8/8): flu  Day -1 (8/9): TBI  Day 0 (8/10): transplant        1.  Acute lymphoblastic leukemia, [place] chromosome positive  in CR, MRD neg.  Here for flu/cy/TBI and sib allo sct  HCT-CI score: 3 (prior solid tumor)  -Off G-CSF so stopped claritin today.  - day +21 BM BX under sedation. RN coordinator will get scheduled at [facility] [place]. Scheduled on 9/2/2021 on the fifth floor.     2.  HEME:  - Pt/donor both Opos  - Transfuse for hgb <7g/dL; plt < 10k.  No transfusions today  - Recent pulmonary emboli: He developed a pulmonary emboli in the setting of receiving PEG asparaginase. On xarelto will continue unless plts <50K.Wcl=239 so continue     3.  FEN/renal:  - mg 1.3. Supplement in clinic today.  Increase mg ox 400mg tid.   - He has a history of renal cancer and resection.   has a single  kidney. Creat=1.17     4.    GVHD: none to date.  Tac/MMF   - On tac 4mg BID, dose increased for level of 7.7 on 8/22.  Repeat level >120, held tac for level today.  Collected 2 tac levels: from line and one level from peripheral blood, pending today.     6.  ID: afebrile.  - prophy acv/letermovir, fluconazole, Stop levaquin today.  - sulfa allergic; pentamidine at day +28  - CMV negative 8/18-repeat CMV tomorrow     9. Hx of graves disease; On synthroid     10. GI:   - Prilosec for reflux     11. CV: Hypertension. Likely secondary to tacrolimus. Inc norvasc 7.5mg and bp is improved today..     RETURN TO CLINIC tomorrow as already scheduled. Add possible infusion appt for Mg tomorrow    Lorrie Yap PA-C  [id number]      I spent 40 minutes in the care of this patient today, which included time necessary for preparation for the visit, obtaining history, ordering medications/tests/procedures as medically indicated, review of pertinent medical literature, counseling of the patient, communication of recommendations to the care team, and documentation time.Reviewed and updated medication list today

## 2021-08-25 NOTE — PROGRESS NOTES
I called and left pt a vm to hold his xaralto prior to his bone marrow bx next week.    HPI      ROS      Physical Exam

## 2021-08-25 NOTE — PATIENT INSTRUCTIONS
Return on Thursday for labs, 8/26/2021, provider visit as scheduled, needs infusion appointment for likely IV magnesium on Thursday

## 2021-08-25 NOTE — LETTER
8/25/2021         RE: Nik Nava  [address] [facility] [address]        Dear Colleague,    Thank you for referring your patient, Nik Nava, to the [facility] BLOOD AND MARROW TRANSPLANT PROGRAM [place]. Please see a copy of my visit note below.    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 Notes intermittent pain in right lower abdomen, this is chronic and stable. . No fevers. No cough or sob. No rash.  Some fatigue.    Review of Systems                                                                                                                                         8pt Review of systems was negative other than noted above.      PHYSICAL EXAM                                                                                                                                                 KPS: 90  Vital Signs 8/25/2021   Systolic 129   Diastolic 80   Pulse 93   Temperature 98   Respirations 16   Weight (LB) 231 lb 3.2 oz   Height    BMI (Calculated)    Pain    O2 99     General: NAD   HEENT: sclera anicteric.  OP clear  Lungs:  lungs clear  Heart: rrr, no m/r/g  Abd:  SNTND.  + BS. No tenderness with palpation  Skin: No rashes or petechaie  Neuro: A&O, speech normal  EXT:  Compression socks in place  Vascular Access: port in the right chest, left double lumen blake not tender     KPS=90  Current aGVHD staging:  Skin 0, UGI 0, LGI 0, Liver 0 (8/25/2021)    Labs:  Lab Results   Component Value Date    WBC 2.9 (L) 08/25/2021    ANEU 1.6 08/25/2021    HGB 9.6 (L) 08/25/2021    HCT 28.4 (L) 08/25/2021     (L) 08/25/2021     08/25/2021    POTASSIUM 4.9 08/25/2021    CHLORIDE 103 08/25/2021    CO2 26 08/25/2021     (H) 08/25/2021    BUN 34 (H) 08/25/2021    CR 1.17 08/25/2021    MAG 1.3 (L) 08/25/2021    INR 1.32 (H) 08/16/2021      OVERALL ASSESSMENT AND  PLAN   Nik Nava is a 61 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, day +15 s/p sib allo stem cell transplant    Day -6 (8/4): flu/cy  Day -5 through day -2 (8/5-8/8): flu  Day -1 (8/9): TBI  Day 0 (8/10): transplant        1.  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  Here for flu/cy/TBI and sib allo sct  HCT-CI score: 3 (prior solid tumor)  -Off G-CSF so stopped claritin today.  - day +21 BM BX under sedation. RN coordinator will get scheduled [place] [facility] [place]. [place] on 9/2/2021 on the fifth floor.     2.  HEME:  - Pt/donor both Opos  - Transfuse for hgb <7g/dL; plt < 10k.  No transfusions today  - Recent pulmonary emboli: He developed a pulmonary emboli in the setting of receiving PEG asparaginase. On xarelto will continue unless plts <50K.Sly=893 so continue     3.  FEN/renal:  - mg 1.3. Supplement in clinic today.  Increase mg ox 400mg tid.   - He has a history of renal cancer and resection.   has a single  kidney. Creat=1.17     4.    GVHD: none to date.  Tac/MMF   - On tac 4mg BID, dose increased for level of 7.7 on 8/22.  Repeat level >120, held tac for level today.  Collected 2 tac levels: from line and one level from peripheral blood, pending today.     6.  ID: afebrile.  - prophy acv/letermovir, fluconazole, Stop levaquin today.  - sulfa allergic; pentamidine at day +28  - CMV negative 8/18-repeat CMV tomorrow     9. Hx of graves disease; On synthroid     10. GI:   - Prilosec for reflux     11. CV: Hypertension. Likely secondary to tacrolimus. Inc norvasc 7.5mg and bp is improved today..     RETURN TO CLINIC tomorrow as already scheduled. Add possible infusion appt for Mg tomorrow    Lorrie Yap PA-C  [phone]      I spent 40 minutes in the care of this patient today, which included time necessary for preparation for the visit, obtaining history, ordering medications/tests/procedures as medically indicated, review of pertinent medical literature, counseling of the patient,  communication of recommendations to the care team, and documentation time.Reviewed and updated medication list today     Nik Nava   Home Medication Instructions ANIL:[id number]    Printed on:08/25/21 6474   Medication Information                      acetaminophen (TYLENOL) 500 MG tablet  Take 500-1,000 mg by mouth every 6 hours as needed for mild pain              acyclovir (ZOVIRAX) 800 MG tablet  Take 1 tablet (800 mg) by mouth 2 times daily             amLODIPine (NORVASC) 5 MG tablet  Take 1.5 tablets (7.5 mg) by mouth daily             fluconazole (DIFLUCAN) 200 MG tablet  Take 1 tablet (200 mg) by mouth daily             letermovir (PREVYMIS) 480 MG TABS tablet  Take 1 tablet (480 mg) by mouth daily Start on 8/24             levothyroxine (SYNTHROID/LEVOTHROID) 200 MCG tablet  Take 200 mcg by mouth daily              LORazepam (ATIVAN) 1 MG tablet  Take 1 mg by mouth nightly as needed for anxiety or sleep              magnesium oxide (MAG-OX) 400 MG tablet  Take three tablets daily             melatonin 5 MG CAPS  Take 5 mg by mouth At Bedtime              mycophenolate (GENERIC EQUIVALENT) 500 MG tablet  Take 3 tablets (1,500 mg) by mouth every 12 hours             nicotine polacrilex (NICORETTE) 4 MG gum  Take 4 mg by mouth as needed for smoking cessation              Nutritional Supplements (ENSURE HIGH PROTEIN)  Take 1 Can by mouth 2 times daily              omeprazole (PRILOSEC) 40 MG DR capsule  Take 40 mg by mouth daily             ondansetron (ZOFRAN) 8 MG tablet  ondansetron HCl 8 mg tablet three times daily as needed for nausea             polyethylene glycol (MIRALAX) 17 GM/Dose powder  Take 17 g by mouth daily as needed for constipation              prochlorperazine (COMPAZINE) 10 MG tablet  Take 0.5 tablets (5 mg) by mouth every 6 hours as needed for nausea or vomiting             senna (SENOKOT) 8.6 MG tablet  Take 17.2 mg by mouth              tacrolimus (GENERIC EQUIVALENT) 0.5 MG  capsule  Taking 4 mg( 4 tablets) by mouth twice daily so can hold the 0.5 mg tablets for now             tacrolimus (GENERIC EQUIVALENT) 1 MG capsule  Take 4 capsules (4 mg) by mouth 2 times daily             ursodiol (ACTIGALL) 300 MG capsule  Take 1 capsule (300 mg) by mouth 3 times daily             XARELTO ANTICOAGULANT 20 MG TABS tablet  Take 20 mg by mouth daily                  Again, thank you for allowing me to participate in the care of your patient.        Sincerely,        BMT Advanced Practice Provider

## 2021-08-25 NOTE — PROGRESS NOTES
Nik Nava   Home Medication Instructions ANIL:[id number]    Printed on:08/25/21 7735   Medication Information                      acetaminophen (TYLENOL) 500 MG tablet  Take 500-1,000 mg by mouth every 6 hours as needed for mild pain              acyclovir (ZOVIRAX) 800 MG tablet  Take 1 tablet (800 mg) by mouth 2 times daily             amLODIPine (NORVASC) 5 MG tablet  Take 1.5 tablets (7.5 mg) by mouth daily             fluconazole (DIFLUCAN) 200 MG tablet  Take 1 tablet (200 mg) by mouth daily             letermovir (PREVYMIS) 480 MG TABS tablet  Take 1 tablet (480 mg) by mouth daily Start on 8/24             levothyroxine (SYNTHROID/LEVOTHROID) 200 MCG tablet  Take 200 mcg by mouth daily              LORazepam (ATIVAN) 1 MG tablet  Take 1 mg by mouth nightly as needed for anxiety or sleep              magnesium oxide (MAG-OX) 400 MG tablet  Take three tablets daily             melatonin 5 MG CAPS  Take 5 mg by mouth At Bedtime              mycophenolate (GENERIC EQUIVALENT) 500 MG tablet  Take 3 tablets (1,500 mg) by mouth every 12 hours             nicotine polacrilex (NICORETTE) 4 MG gum  Take 4 mg by mouth as needed for smoking cessation              Nutritional Supplements (ENSURE HIGH PROTEIN)  Take 1 Can by mouth 2 times daily              omeprazole (PRILOSEC) 40 MG DR capsule  Take 40 mg by mouth daily             ondansetron (ZOFRAN) 8 MG tablet  ondansetron HCl 8 mg tablet three times daily as needed for nausea             polyethylene glycol (MIRALAX) 17 GM/Dose powder  Take 17 g by mouth daily as needed for constipation              prochlorperazine (COMPAZINE) 10 MG tablet  Take 0.5 tablets (5 mg) by mouth every 6 hours as needed for nausea or vomiting             senna (SENOKOT) 8.6 MG tablet  Take 17.2 mg by mouth              tacrolimus (GENERIC EQUIVALENT) 0.5 MG capsule  Taking 4 mg( 4 tablets) by mouth twice daily so can hold the 0.5 mg tablets for now             tacrolimus (GENERIC  EQUIVALENT) 1 MG capsule  Take 4 capsules (4 mg) by mouth 2 times daily             ursodiol (ACTIGALL) 300 MG capsule  Take 1 capsule (300 mg) by mouth 3 times daily             XARELTO ANTICOAGULANT 20 MG TABS tablet  Take 20 mg by mouth daily

## 2021-08-25 NOTE — NURSING NOTE
Chief Complaint   Patient presents with     Blood Draw     Labs drawn via cvc by RN in lab. VS taken.      Labs collected from CVC by RN. Red line flushed with saline and heparin, purple line heparin locked, caps changed. Tacrolimus level drawn peripherally via  by RN as well per Lorrie Yap.  Vitals taken. Pt checked in for appointment(s).    Tamie Pruett RN

## 2021-08-26 ENCOUNTER — APPOINTMENT (OUTPATIENT)
Dept: LAB | Facility: CLINIC | Age: 61
End: 2021-08-26
Attending: INTERNAL MEDICINE
Payer: COMMERCIAL

## 2021-08-26 ENCOUNTER — ONCOLOGY VISIT (OUTPATIENT)
Dept: TRANSPLANT | Facility: CLINIC | Age: 61
End: 2021-08-26
Attending: INTERNAL MEDICINE
Payer: COMMERCIAL

## 2021-08-26 VITALS
DIASTOLIC BLOOD PRESSURE: 83 MMHG | OXYGEN SATURATION: 99 % | TEMPERATURE: 98.1 F | BODY MASS INDEX: 31.87 KG/M2 | RESPIRATION RATE: 16 BRPM | SYSTOLIC BLOOD PRESSURE: 134 MMHG | HEIGHT: 72 IN | HEART RATE: 86 BPM | WEIGHT: 235.3 LBS

## 2021-08-26 DIAGNOSIS — C91.01 ACUTE LYMPHOBLASTIC LEUKEMIA (ALL) IN REMISSION (H): Primary | ICD-10-CM

## 2021-08-26 PROCEDURE — G0463 HOSPITAL OUTPT CLINIC VISIT: HCPCS

## 2021-08-26 PROCEDURE — 99215 OFFICE O/P EST HI 40 MIN: CPT

## 2021-08-26 PROCEDURE — 250N000011 HC RX IP 250 OP 636: Performed by: INTERNAL MEDICINE

## 2021-08-26 RX ORDER — HEPARIN SODIUM,PORCINE 10 UNIT/ML
5 VIAL (ML) INTRAVENOUS ONCE
Status: COMPLETED | OUTPATIENT
Start: 2021-08-26 | End: 2021-08-26

## 2021-08-26 RX ADMIN — Medication 5 ML: at 11:07

## 2021-08-26 RX ADMIN — Medication 5 ML: at 11:06

## 2021-08-26 ASSESSMENT — MIFFLIN-ST. JEOR: SCORE: 1900.44

## 2021-08-26 ASSESSMENT — PAIN SCALES - GENERAL: PAINLEVEL: NO PAIN (0)

## 2021-08-26 NOTE — NURSING NOTE
"Oncology Rooming Note    August 26, 2021 11:21 AM   Nik Nava is a 61 year old male who presents for:    Chief Complaint   Patient presents with     Oncology Clinic Visit     [facility] RETURN - ALL     Blood Draw     Labs drawn via cvc by rn in lab. VS taken.     Initial Vitals: /83 (BP Location: Right arm, Patient Position: Sitting, Cuff Size: Adult Regular)   Pulse 86   Temp 98.1  F (36.7  C) (Oral)   Resp 16   Ht 1.829 m (6' 0.01\")   Wt 106.7 kg (235 lb 4.8 oz)   SpO2 99%   BMI 31.91 kg/m   Estimated body mass index is 31.91 kg/m  as calculated from the following:    Height as of this encounter: 1.829 m (6' 0.01\").    Weight as of this encounter: 106.7 kg (235 lb 4.8 oz). Body surface area is 2.33 meters squared.  No Pain (0) Comment: Data Unavailable   No LMP for male patient.  Allergies reviewed: Yes  Medications reviewed: Yes    Medications: Medication refills not needed today.  Pharmacy name entered into [employer]:    [facility] - [place], [place] - [address] [facility] [place], [place] - [phone]    Clinical concerns: No new concerns. Nuvia was notified.      Kelvin Gomez LPN            "

## 2021-08-26 NOTE — LETTER
8/26/2021         RE: Nik Nava  [address] [facility] [address]        Dear Colleague,    Thank you for referring your patient, Nik Nava, to the [facility] BLOOD AND MARROW TRANSPLANT PROGRAM [place]. Please see a copy of my visit note below.    BMT clinic Note    ID:  Nik Nava is a 61 year old male with Ph+ ALL, day +16 s/p sib allo stem cell transplant     Interval History:   No n/v/d. Notes intermittent pain in right lower abdomen, this is chronic and stable.  No fevers. No cough or sob. No rash.  Some fatigue but gernally doing well. He is eating although no breakfast as he has been been a breakfast eater but does have some bloating/ugh feels after AM meds.     Review of Systems                                                                                                                                         8pt Review of systems was negative other than noted above.      PHYSICAL EXAM                                                                                                                                                 KPS: 90  Vital Signs 8/25/2021   Systolic 129   Diastolic 80   Pulse 93   Temperature 98   Respirations 16   Weight (LB) 231 lb 3.2 oz   Height    BMI (Calculated)    Pain    O2 99     General: NAD   HEENT: sclera anicteric.  OP clear  Lungs:  lungs clear  Heart: rrr, no m/r/g  Abd:  SNTND.  + BS. No tenderness with palpation  Skin: No rashes or petechaie  Neuro: A&O, speech normal  EXT:  Compression socks in place  Vascular Access: port in the right chest, left double lumen blake not tender     KPS=90  Current aGVHD staging:  Skin 0, UGI 0, LGI 0, Liver 0 (8/25/2021)    Labs:  Lab Results   Component Value Date    WBC 2.9 (L) 08/25/2021    ANEU 1.6 08/25/2021    HGB 9.6 (L) 08/25/2021    HCT 28.4 (L) 08/25/2021     (L) 08/25/2021     08/25/2021    POTASSIUM 4.9 08/25/2021    CHLORIDE 103 08/25/2021    CO2 26 08/25/2021    GLC  110 (H) 08/25/2021    BUN 34 (H) 08/25/2021    CR 1.17 08/25/2021    MAG 1.3 (L) 08/25/2021    INR 1.32 (H) 08/16/2021      OVERALL ASSESSMENT AND PLAN   Nik Nava is a 61 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, 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  Here for flu/cy/TBI and sib allo sct  HCT-CI score: 3 (prior solid tumor)  -Off G-CSF   - day +21 BM BX under sedation. RN coordinator will get scheduled at [facility] [place]. Scheduled on 9/2/2021 on the fifth floor.     2.  HEME:  - Pt/donor both Opos  - Transfuse for hgb <7g/dL; plt < 10k.  No transfusions today  - Recent pulmonary emboli: He developed a pulmonary emboli in the setting of receiving PEG asparaginase. On xarelto will continue unless plts <50K.Bsh=793 so continue  - Plts 150 - pt has port-a-cath. Ordered Central line removal - scheduling to coordinate - hopeful will be removed next week.      3.  FEN/renal:  - mg 1.3. Supplement in clinic today.  Increase mg ox 400mg tid.   - He has a history of renal cancer and resection.   has a single  kidney. Creat=1.17     4.    GVHD: none to date.  Tac/MMF   - On tac 4mg BID, dose increased for level of 7.7 on 8/22.  Repeat level >120, held tac for level today.  - 8/25 tac level red line 7.9 and peripheral level 7.3. No change to dose.   9/8/30 is next tac level.        6.  ID: afebrile.  - prophy acv/letermovir, fluconazole,  - sulfa allergic; pentamidine at day +28  - CMV negative 8/18-repeat CMV pending 8/25.      9. Hx of graves disease; On synthroid     10. GI:   - Prilosec for reflux     11. CV: Hypertension. Likely secondary to tacrolimus. Inc norvasc 7.5mg and bp is improved today.       Cancel 8/27 appts. Schedule for labs, DOM and possible IV mag on Saturday   Hopeful for MWF schedule soon,   - Requested Monday labs,  provider visit appt.   - Awaiting central line removal to be scheudled. Consider completing covid swab 8/30 if line removal can be down 8/31 or 9/1,   Day+21 bmbx 9/2.     Nuvia Wisdom PA-C  [phone]      I spent 40 minutes in the care of this patient today, which included time necessary for preparation for the visit, obtaining history, ordering medications/tests/procedures as medically indicated, review of pertinent medical literature, counseling of the patient, communication of recommendations to the care team, and documentation time.Reviewed and updated medication list today      Again, thank you for allowing me to participate in the care of your patient.        Sincerely,        BMT Advanced Practice Provider

## 2021-08-26 NOTE — NURSING NOTE
Chief Complaint   Patient presents with     Oncology Clinic Visit     [facility] RETURN - ALL     Blood Draw     Labs drawn via cvc by rn in lab. VS taken.     Labs collected from CVC by RN, line flushed with saline and heparin.  Vitals taken. Pt checked in for appointment(s).    Ceasar Merino RN

## 2021-08-26 NOTE — PROGRESS NOTES
BMT clinic Note    ID:  Nik Nava is a 61 year old male with Ph+ ALL, day +16 s/p sib allo stem cell transplant     Interval History:   No n/v/d. Notes intermittent pain in right lower abdomen, this is chronic and stable.  No fevers. No cough or sob. No rash.  Some fatigue but gernally doing well. He is eating although no breakfast as he has been been a breakfast eater but does have some bloating/ugh feels after AM meds.     Review of Systems                                                                                                                                         8pt Review of systems was negative other than noted above.      PHYSICAL EXAM                                                                                                                                                 KPS: 90  Vital Signs 8/25/2021   Systolic 129   Diastolic 80   Pulse 93   Temperature 98   Respirations 16   Weight (LB) 231 lb 3.2 oz   Height    BMI (Calculated)    Pain    O2 99     General: NAD   HEENT: sclera anicteric.  OP clear  Lungs:  lungs clear  Heart: rrr, no m/r/g  Abd:  SNTND.  + BS. No tenderness with palpation  Skin: No rashes or petechaie  Neuro: A&O, speech normal  EXT:  Compression socks in place  Vascular Access: port in the right chest, left double lumen blake not tender     KPS=90  Current aGVHD staging:  Skin 0, UGI 0, LGI 0, Liver 0 (8/25/2021)    Labs:  Lab Results   Component Value Date    WBC 2.9 (L) 08/25/2021    ANEU 1.6 08/25/2021    HGB 9.6 (L) 08/25/2021    HCT 28.4 (L) 08/25/2021     (L) 08/25/2021     08/25/2021    POTASSIUM 4.9 08/25/2021    CHLORIDE 103 08/25/2021    CO2 26 08/25/2021     (H) 08/25/2021    BUN 34 (H) 08/25/2021    CR 1.17 08/25/2021    MAG 1.3 (L) 08/25/2021    INR 1.32 (H) 08/16/2021      OVERALL ASSESSMENT AND PLAN   Nik Nava is a 61 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, 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  Here for flu/cy/TBI and sib allo sct  HCT-CI score: 3 (prior solid tumor)  -Off G-CSF   - day +21 BM BX under sedation. [place] [facility] [place]2/[place] on the fifth floor.     2.  HEME:  - Pt/donor both Opos  - Transfuse for hgb <7g/dL; plt < 10k.  No transfusions today  - Recent pulmonary emboli: He developed a pulmonary emboli in the setting of receiving PEG asparaginase. On xarelto will continue unless plts <50K.Jtg=932 so continue  - Plts 150 - pt has port-a-cath. Ordered Central line removal - scheduling to coordinate - hopeful will be removed next week.      3.  FEN/renal:  - mg 1.3. Supplement in clinic today.  Increase mg ox 400mg tid.   - He has a history of renal cancer and resection.   has a single  kidney. Creat=1.17     4.    GVHD: none to date.  Tac/MMF   - On tac 4mg BID, dose increased for level of 7.7 on 8/22.  Repeat level >120, held tac for level today.  - 8/25 tac level red line 7.9 and peripheral level 7.3. No change to dose.   9/8/30 is next tac level.        6.  ID: afebrile.  - prophy acv/letermovir, fluconazole,  - sulfa allergic; pentamidine at day +28  - CMV negative 8/18-repeat CMV pending 8/25.      9. Hx of graves disease; On synthroid     10. GI:   - Prilosec for reflux     11. CV: Hypertension. Likely secondary to tacrolimus. Inc norvasc 7.5mg and bp is improved today.       Cancel 8/27 appts. Schedule for labs, DOM and possible IV mag on Saturday   Hopeful for MWF schedule soon,   - Requested Monday labs, provider visit appt.   - Awaiting central line removal to be scheudled. Consider completing covid swab 8/30 if line removal can be down 8/31 or 9/1,   Day+21 bmbx 9/2.     Nuvia Wisdom PA-C  [phone]      I spent 40 minutes in the care of this patient today, which included time  necessary for preparation for the visit, obtaining history, ordering medications/tests/procedures as medically indicated, review of pertinent medical literature, counseling of the patient, communication of recommendations to the care team, and documentation time.Reviewed and updated medication list today

## 2021-08-26 NOTE — PROGRESS NOTES
This is a recent snapshot of the patient's [facility] medical record.  For current drug dose and complete information and questions, call [phone] or In Basket pool, [facility] home infusion ([id number])  CSN Number:  [id number]

## 2021-08-27 ENCOUNTER — TELEPHONE (OUTPATIENT)
Dept: TRANSPLANT | Facility: CLINIC | Age: 61
End: 2021-08-27

## 2021-08-27 NOTE — TELEPHONE ENCOUNTER
Pt wife called triage pager as pt has a new rash.  He is day +17 s/p sib allo SCT.      No fever.  No diarrhea.  Otherwise feeling really good.    Rash is small red 'pin points' on upper back.  Few on L shoulder and under L ear.  Some have whitish center like little pimples.  No pruritus or pain.  No vesicles or desquamation.  BSA <25%.     Pt has f/u on Sat and again Monday.     Pt will be evaluated tomorrow.  If rash becomes confluent or appears c/w GVHD, could consider skin punch bx Monday (I have added on appt for this should it be needed).    Pt/wife know to call should rash evolve more quickly or should he develop blisters or desquamation.    Lashanda Figueroa pa-c  [phone]

## 2021-08-28 ENCOUNTER — INFUSION THERAPY VISIT (OUTPATIENT)
Dept: TRANSPLANT | Facility: CLINIC | Age: 61
End: 2021-08-28
Attending: INTERNAL MEDICINE
Payer: COMMERCIAL

## 2021-08-28 ENCOUNTER — APPOINTMENT (OUTPATIENT)
Dept: LAB | Facility: CLINIC | Age: 61
End: 2021-08-28
Attending: INTERNAL MEDICINE
Payer: COMMERCIAL

## 2021-08-28 VITALS
DIASTOLIC BLOOD PRESSURE: 79 MMHG | RESPIRATION RATE: 16 BRPM | TEMPERATURE: 98.8 F | OXYGEN SATURATION: 98 % | BODY MASS INDEX: 31.59 KG/M2 | SYSTOLIC BLOOD PRESSURE: 122 MMHG | HEART RATE: 89 BPM | WEIGHT: 233 LBS

## 2021-08-28 DIAGNOSIS — C91.01 ACUTE LYMPHOBLASTIC LEUKEMIA (ALL) IN REMISSION (H): ICD-10-CM

## 2021-08-28 DIAGNOSIS — Z94.81 STATUS POST BONE MARROW TRANSPLANT (H): Primary | ICD-10-CM

## 2021-08-28 LAB
ANION GAP SERPL CALCULATED.3IONS-SCNC: 7 MMOL/L (ref 3–14)
BASOPHILS # BLD MANUAL: 0 10E3/UL (ref 0–0.2)
BASOPHILS NFR BLD MANUAL: 0 %
BUN SERPL-MCNC: 38 MG/DL (ref 7–30)
CALCIUM SERPL-MCNC: 9.5 MG/DL (ref 8.5–10.1)
CHLORIDE BLD-SCNC: 107 MMOL/L (ref 94–109)
CO2 SERPL-SCNC: 25 MMOL/L (ref 20–32)
CREAT SERPL-MCNC: 1.13 MG/DL (ref 0.66–1.25)
EOSINOPHIL # BLD MANUAL: 0 10E3/UL (ref 0–0.7)
EOSINOPHIL NFR BLD MANUAL: 0 %
ERYTHROCYTE [DISTWIDTH] IN BLOOD BY AUTOMATED COUNT: 14.6 % (ref 10–15)
GFR SERPL CREATININE-BSD FRML MDRD: 69 ML/MIN/1.73M2
GLUCOSE BLD-MCNC: 102 MG/DL (ref 70–99)
HCT VFR BLD AUTO: 26.3 % (ref 40–53)
HGB BLD-MCNC: 9 G/DL (ref 13.3–17.7)
LYMPHOCYTES # BLD MANUAL: 0.7 10E3/UL (ref 0.8–5.3)
LYMPHOCYTES NFR BLD MANUAL: 22 %
MAGNESIUM SERPL-MCNC: 1.3 MG/DL (ref 1.6–2.3)
MCH RBC QN AUTO: 33.6 PG (ref 26.5–33)
MCHC RBC AUTO-ENTMCNC: 34.2 G/DL (ref 31.5–36.5)
MCV RBC AUTO: 98 FL (ref 78–100)
MONOCYTES # BLD MANUAL: 0.4 10E3/UL (ref 0–1.3)
MONOCYTES NFR BLD MANUAL: 12 %
MYELOCYTES # BLD MANUAL: 0.1 10E3/UL
MYELOCYTES NFR BLD MANUAL: 2 %
NEUTROPHILS # BLD MANUAL: 2 10E3/UL (ref 1.6–8.3)
NEUTROPHILS NFR BLD MANUAL: 64 %
PLAT MORPH BLD: ABNORMAL
PLATELET # BLD AUTO: 161 10E3/UL (ref 150–450)
POTASSIUM BLD-SCNC: 4.8 MMOL/L (ref 3.4–5.3)
RBC # BLD AUTO: 2.68 10E6/UL (ref 4.4–5.9)
RBC MORPH BLD: ABNORMAL
SODIUM SERPL-SCNC: 139 MMOL/L (ref 133–144)
WBC # BLD AUTO: 3.2 10E3/UL (ref 4–11)

## 2021-08-28 PROCEDURE — 36592 COLLECT BLOOD FROM PICC: CPT

## 2021-08-28 PROCEDURE — 83735 ASSAY OF MAGNESIUM: CPT

## 2021-08-28 PROCEDURE — 96365 THER/PROPH/DIAG IV INF INIT: CPT

## 2021-08-28 PROCEDURE — G0463 HOSPITAL OUTPT CLINIC VISIT: HCPCS

## 2021-08-28 PROCEDURE — 250N000011 HC RX IP 250 OP 636: Performed by: INTERNAL MEDICINE

## 2021-08-28 PROCEDURE — 85027 COMPLETE CBC AUTOMATED: CPT

## 2021-08-28 PROCEDURE — 99215 OFFICE O/P EST HI 40 MIN: CPT

## 2021-08-28 PROCEDURE — 82374 ASSAY BLOOD CARBON DIOXIDE: CPT

## 2021-08-28 RX ORDER — TRIAMCINOLONE ACETONIDE 1 MG/G
CREAM TOPICAL 2 TIMES DAILY
Qty: 453.6 G | Refills: 3 | Status: SHIPPED | OUTPATIENT
Start: 2021-08-28 | End: 2022-04-06

## 2021-08-28 RX ORDER — MAGNESIUM SULFATE HEPTAHYDRATE 40 MG/ML
2 INJECTION, SOLUTION INTRAVENOUS ONCE
Status: COMPLETED | OUTPATIENT
Start: 2021-08-28 | End: 2021-08-28

## 2021-08-28 RX ORDER — MAGNESIUM OXIDE 400 MG/1
TABLET ORAL
Qty: 120 TABLET | Refills: 4 | Status: SHIPPED | OUTPATIENT
Start: 2021-08-28 | End: 2021-09-14

## 2021-08-28 RX ORDER — HEPARIN SODIUM,PORCINE 10 UNIT/ML
5 VIAL (ML) INTRAVENOUS
Status: DISCONTINUED | OUTPATIENT
Start: 2021-08-28 | End: 2021-08-28 | Stop reason: HOSPADM

## 2021-08-28 RX ADMIN — SODIUM CHLORIDE, PRESERVATIVE FREE 5 ML: 5 INJECTION INTRAVENOUS at 10:10

## 2021-08-28 RX ADMIN — MAGNESIUM SULFATE HEPTAHYDRATE 2 G: 40 INJECTION, SOLUTION INTRAVENOUS at 08:49

## 2021-08-28 ASSESSMENT — PAIN SCALES - GENERAL: PAINLEVEL: NO PAIN (0)

## 2021-08-28 NOTE — NURSING NOTE
Chief Complaint   Patient presents with     Blood Draw     Labs drawn via CVC by RN in lab. VS taken.      Winnie Corrales RN

## 2021-08-28 NOTE — LETTER
8/28/2021         RE: Nik Nava  [address] [facility] [address]        Dear Colleague,    Thank you for referring your patient, Nik Nava, to the [facility] BLOOD AND MARROW TRANSPLANT PROGRAM [place]. Please see a copy of my visit note below.    Infusion Nursing Note:  Nik Nava presents today for IV magnesium.    Patient seen by provider today: Yes: Dr Grimaldo   present during visit today: Not Applicable.    Note:   Pt received 2 grams IV magnesium over an hour for a magnesium level of 1.3       Intravenous Access:  Mcknight.    Treatment Conditions:  Lab Results   Component Value Date    HGB 9.0 08/28/2021    HGB 8.5 07/08/2021     Lab Results   Component Value Date    WBC 3.2 08/28/2021    WBC 8.9 07/08/2021      Lab Results   Component Value Date    ANEU 2.0 08/28/2021    ANEU 7.2 07/08/2021     Lab Results   Component Value Date     08/28/2021     07/08/2021      Lab Results   Component Value Date     08/28/2021     07/08/2021                   Lab Results   Component Value Date    POTASSIUM 4.8 08/28/2021    POTASSIUM 3.9 07/08/2021           Lab Results   Component Value Date    MAG 1.3 08/28/2021            Lab Results   Component Value Date    CR 1.13 08/28/2021    CR 0.94 07/08/2021                   Lab Results   Component Value Date    DAMON 9.5 08/28/2021    DAMON 8.9 07/08/2021                Lab Results   Component Value Date    BILITOTAL 0.4 08/19/2021    BILITOTAL 0.3 07/08/2021           Lab Results   Component Value Date    ALBUMIN 3.2 08/19/2021    ALBUMIN 3.7 07/08/2021                    Lab Results   Component Value Date    ALT 17 08/19/2021    ALT 28 07/08/2021           Lab Results   Component Value Date    AST 11 08/19/2021    AST 22 07/08/2021       Results reviewed, labs MET treatment parameters, ok to proceed with treatment.      Post Infusion Assessment:  Patient tolerated infusion without incident.  Blood  return noted pre and post infusion.  Site patent and intact, free from redness, edema or discomfort.  No evidence of extravasations.  Access discontinued per protocol.       Discharge Plan:   Discharge instructions reviewed with: Patient.  Patient discharged in stable condition accompanied by: self.  Departure Mode: Ambulatory.      Catina Caraballo RN                          Again, thank you for allowing me to participate in the care of your patient.        Sincerely,        [facility]

## 2021-08-28 NOTE — PATIENT INSTRUCTIONS
RTC BMT DOM/NP/PA on 8/30 as scheduled  Possible skin biopsy 8/30  Has TCM cream is rash progresses  Increased Mg to 4 tabs/day  Hold tacrolimus on 8/30 to measure level  Call come sooner if needed

## 2021-08-28 NOTE — NURSING NOTE
Chief Complaint   Patient presents with     Infusion     possible infusion.  history of ALL.     Uma Caraballo RN     Home Suture Removal Text: Patient was provided a home suture removal kit and will remove their sutures at home.  If they have any questions or difficulties they will call the office.

## 2021-08-28 NOTE — LETTER
8/28/2021         RE: Nik Nava  [address] [address]        Dear Colleague,    Thank you for referring your patient, Nik Nava, to the [facility] BLOOD AND MARROW TRANSPLANT PROGRAM [place]. Please see a copy of my visit note below.    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  Yesterday his wife noted a rash and called accordingly.  Today she thinks the rash is extending a little further on his chest.  He has no nausea, vomiting.  Still has some oral intake.  Energy is reduced but it is there.  No fevers, no cough, and no shortness of breath.  No genitourinary complaints.  Had some loose stools but no watery diarrhea.    Review of Systems                                                                                                                                         8pt Review of systems was negative other than noted above.      PHYSICAL EXAM                                                                                                                                                 KPS: 80  /79   Pulse 89   Temp 98.8  F (37.1  C) (Oral)   Resp 16   Wt 105.7 kg (233 lb)   SpO2 98%   BMI 31.59 kg/m    General: NAD   HEENT: sclera anicteric.  OP clear  Lungs:  lungs clear  Heart: rrr, no m/r/g  Abd:  SNTND.  + BS. No tenderness with palpation  Skin: He has a follicular, fine vesicular rash in his mid front chest.  There is similar but perhaps a slightly more maculopapular rash, very faint on his back.  I documented that in the media tab in today's date 8/28/2021   Neuro: A&O, speech normal  EXT:  Compression socks in place  Vascular Access: port in the right chest, left double lumen blake not tender     KPS=90  Current aGVHD staging:  Skin 0, UGI 0, LGI 0, Liver 0  (8/25/2021)    Labs:  Lab Results   Component Value Date    WBC 3.2 (L) 08/28/2021    ANEU 2.0 08/28/2021    HGB 9.0 (L) 08/28/2021    HCT 26.3 (L) 08/28/2021     08/28/2021     08/28/2021    POTASSIUM 4.8 08/28/2021    CHLORIDE 107 08/28/2021    CO2 25 08/28/2021     (H) 08/28/2021    BUN 38 (H) 08/28/2021    CR 1.13 08/28/2021    MAG 1.3 (L) 08/28/2021    INR 1.32 (H) 08/16/2021      OVERALL ASSESSMENT AND PLAN   Nik Nava is a 61 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, 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  Acute lymphoblastic leukemia, [place] chromosome positive in CR, MRD neg.  Here for flu/cy/TBI and sib allo sct  HCT-CI score: 3 (prior solid tumor)  -Off G-CSF   - day +21 BM BX under sedation. [place] [facility] [place] on the fifth floor.     2.  HEME: His blood counts are stable and seem to grow slowly continue to recover.  - Pt/donor both Opos  - Transfuse for hgb <7g/dL; plt < 10k.  No transfusions today  - Recent pulmonary emboli: He developed a pulmonary emboli in the setting of receiving PEG asparaginase. On xarelto will continue unless plts <50K.Uep=050 so continue  - Plts 150 - pt has port-a-cath. Ordered Central line removal - scheduling to coordinate - hopeful will be removed next week.      3.  FEN/renal: His magnesium was only 1.3.  We gave him 2 g of magnesium in clinic and increase his oral replacement to 4 tablets/day.  - He has a history of renal cancer and resection.   has a single  kidney. Creat=1.17     4.    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  Still of limited extension.  I did give him a prescription for the TCM cream and instructed them to not start unless the rash significantly progresses in the next 24 hours.  I also gave her the telephone number to the clinic so that we could discuss if they  have questions prior to starting the triamcinolone.  I explained that it is best to have no steroid on top of the rash if we will get a biopsy at to have a more likely answer from the biopsy.  Otherwise, the rash is worse on Monday we will biopsy it.  Tac/MMF   - On tac 4mg BID, dose increased for level of 7.7 on 8/22.  Repeat level >120, held tac for level today.  - 8/25 tac level red line 7.9 and peripheral level 7.3. No change to dose.   9/8/30 is next tac level.     5.  ID: afebrile.  No active infections  - prophy acv/letermovir, fluconazole,  - sulfa allergic; pentamidine at day +28  - CMV negative 8/18-repeat CMV pending 8/25.      6. Hx of graves disease; On synthroid     7. GI: Monitor stools with increased dose of magnesium  - Prilosec for reflux     8. CV: Hypertension.  Blood pressure was good today.  Likely secondary to tacrolimus. Inc norvasc 7.5mg and bp is improved today.     Plan:  RTC BMT DOM/NP/PA on 8/30 as scheduled  Possible skin biopsy 8/30  Has TCM cream is rash progresses  Increased Mg to 4 tabs/day  Hold tacrolimus on 8/30 to measure level  Call come sooner if needed    Cameron Grimaldo MD on 8/28/2021 at 8:54 AM      I spent 40 minutes in the care of this patient today, which included time necessary for preparation for the visit, obtaining history, ordering medications/tests/procedures as medically indicated, review of pertinent medical literature, counseling of the patient, communication of recommendations to the care team, and documentation time. Reviewed and updated medication list today.

## 2021-08-28 NOTE — PROGRESS NOTES
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  Yesterday his wife noted a rash and called accordingly.  Today she thinks the rash is extending a little further on his chest.  He has no nausea, vomiting.  Still has some oral intake.  Energy is reduced but it is there.  No fevers, no cough, and no shortness of breath.  No genitourinary complaints.  Had some loose stools but no watery diarrhea.    Review of Systems                                                                                                                                         8pt Review of systems was negative other than noted above.      PHYSICAL EXAM                                                                                                                                                 KPS: 80  /79   Pulse 89   Temp 98.8  F (37.1  C) (Oral)   Resp 16   Wt 105.7 kg (233 lb)   SpO2 98%   BMI 31.59 kg/m    General: NAD   HEENT: sclera anicteric.  OP clear  Lungs:  lungs clear  Heart: rrr, no m/r/g  Abd:  SNTND.  + BS. No tenderness with palpation  Skin: He has a follicular, fine vesicular rash in his mid front chest.  There is similar but perhaps a slightly more maculopapular rash, very faint on his back.  I documented that in the media tab in today's date 8/28/2021   Neuro: A&O, speech normal  EXT:  Compression socks in place  Vascular Access: port in the right chest, left double lumen blake not tender     KPS=90  Current aGVHD staging:  Skin 0, UGI 0, LGI 0, Liver 0 (8/25/2021)    Labs:  Lab Results   Component Value Date    WBC 3.2 (L) 08/28/2021    ANEU 2.0 08/28/2021    HGB 9.0 (L) 08/28/2021    HCT 26.3 (L) 08/28/2021     08/28/2021     08/28/2021    POTASSIUM 4.8 08/28/2021    CHLORIDE 107 08/28/2021    CO2 25 08/28/2021     (H) 08/28/2021     BUN 38 (H) 08/28/2021    CR 1.13 08/28/2021    MAG 1.3 (L) 08/28/2021    INR 1.32 (H) 08/16/2021      OVERALL ASSESSMENT AND PLAN   Nik Nava is a 61 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, 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  Here for flu/cy/TBI and sib allo sct  HCT-CI score: 3 (prior solid tumor)  -Off G-CSF   - day +21 BM BX under sedation. RN coordinator will get scheduled at [facility] [place]. Scheduled on 9/2/2021 on the fifth floor.     2.  HEME: His blood counts are stable and seem to grow slowly continue to recover.  - Pt/donor both Opos  - Transfuse for hgb <7g/dL; plt < 10k.  No transfusions today  - Recent pulmonary emboli: He developed a pulmonary emboli in the setting of receiving PEG asparaginase. On xarelto will continue unless plts <50K.Lip=317 so continue  - Plts 150 - pt has port-a-cath. Ordered Central line removal - scheduling to coordinate - hopeful will be removed next week.      3.  FEN/renal: His magnesium was only 1.3.  We gave him 2 g of magnesium in clinic and increase his oral replacement to 4 tablets/day.  - He has a history of renal cancer and resection.   has a single  kidney. Creat=1.17     4.    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  Still of limited extension.  I did give him a prescription for the TCM cream and instructed them to not start unless the rash significantly progresses in the next 24 hours.  I also gave her the telephone number to the clinic so that we could discuss if they have questions prior to starting the triamcinolone.  I explained that it is best to have no steroid on top of the rash if we will get a biopsy at to have a more likely answer from the biopsy.  Otherwise, the rash is worse on Monday we will biopsy it.  Tac/MMF   - On tac 4mg BID, dose increased for level of  7.7 on 8/22.  Repeat level >120, held tac for level today.  - 8/25 tac level red line 7.9 and peripheral level 7.3. No change to dose.   9/8/30 is next tac level.     5.  ID: afebrile.  No active infections  - prophy acv/letermovir, fluconazole,  - sulfa allergic; pentamidine at day +28  - CMV negative 8/18-repeat CMV pending 8/25.      6. Hx of graves disease; On synthroid     7. GI: Monitor stools with increased dose of magnesium  - Prilosec for reflux     8. CV: Hypertension.  Blood pressure was good today.  Likely secondary to tacrolimus. Inc norvasc 7.5mg and bp is improved today.       Plan:  RTC BMT DOM/NP/PA on 8/30 as scheduled  Possible skin biopsy 8/30  Has TCM cream is rash progresses  Increased Mg to 4 tabs/day  Hold tacrolimus on 8/30 to measure level  Call come sooner if needed    Cameron Grimaldo MD on 8/28/2021 at 8:54 AM      I spent 40 minutes in the care of this patient today, which included time necessary for preparation for the visit, obtaining history, ordering medications/tests/procedures as medically indicated, review of pertinent medical literature, counseling of the patient, communication of recommendations to the care team, and documentation time.Reviewed and updated medication list today

## 2021-08-28 NOTE — NURSING NOTE
"Oncology Rooming Note    August 28, 2021 8:30 AM   Nik Nava is a 61 year old male who presents for:    Chief Complaint   Patient presents with     Blood Draw     Labs drawn via CVC by RN in lab. VS taken.      RECHECK     provider visit.  history of ALL.     Initial Vitals: /79   Pulse 89   Temp 98.8  F (37.1  C) (Oral)   Resp 16   Wt 105.7 kg (233 lb)   SpO2 98%   BMI 31.59 kg/m   Estimated body mass index is 31.59 kg/m  as calculated from the following:    Height as of 8/26/21: 1.829 m (6' 0.01\").    Weight as of this encounter: 105.7 kg (233 lb). Body surface area is 2.32 meters squared.  No Pain (0) Comment: Data Unavailable   No LMP for male patient.  Allergies reviewed: Yes  Medications reviewed: Yes    Medications: Medication refills not needed today.  Pharmacy name entered into eTipping:    [facility] - [place], [place] - [address] [facility] [place], [place] - [phone]    Clinical concerns: pt reports fine red rash to upper back that started yesterday, today has spread to upper chest.  Denies pain or itching.       Catina Caraballo RN              "

## 2021-08-28 NOTE — PROGRESS NOTES
Infusion Nursing Note:  Nik MCDONALD [place] presents today for IV magnesium.    Patient seen by provider today: Yes: Dr Grimaldo   present during visit today: Not Applicable.    Note:   Pt received 2 grams IV magnesium over an hour for a magnesium level of 1.3       Intravenous Access:  Mcknight.    Treatment Conditions:  Lab Results   Component Value Date    HGB 9.0 08/28/2021    HGB 8.5 07/08/2021     Lab Results   Component Value Date    WBC 3.2 08/28/2021    WBC 8.9 07/08/2021      Lab Results   Component Value Date    ANEU 2.0 08/28/2021    ANEU 7.2 07/08/2021     Lab Results   Component Value Date     08/28/2021     07/08/2021      Lab Results   Component Value Date     08/28/2021     07/08/2021                   Lab Results   Component Value Date    POTASSIUM 4.8 08/28/2021    POTASSIUM 3.9 07/08/2021           Lab Results   Component Value Date    MAG 1.3 08/28/2021            Lab Results   Component Value Date    CR 1.13 08/28/2021    CR 0.94 07/08/2021                   Lab Results   Component Value Date    DAMON 9.5 08/28/2021    DAMON 8.9 07/08/2021                Lab Results   Component Value Date    BILITOTAL 0.4 08/19/2021    BILITOTAL 0.3 07/08/2021           Lab Results   Component Value Date    ALBUMIN 3.2 08/19/2021    ALBUMIN 3.7 07/08/2021                    Lab Results   Component Value Date    ALT 17 08/19/2021    ALT 28 07/08/2021           Lab Results   Component Value Date    AST 11 08/19/2021    AST 22 07/08/2021       Results reviewed, labs MET treatment parameters, ok to proceed with treatment.      Post Infusion Assessment:  Patient tolerated infusion without incident.  Blood return noted pre and post infusion.  Site patent and intact, free from redness, edema or discomfort.  No evidence of extravasations.  Access discontinued per protocol.       Discharge Plan:   Discharge instructions reviewed with: Patient.  Patient discharged in stable condition  accompanied by: self.  Departure Mode: Ambulatory.      Catina Caraballo RN

## 2021-08-30 ENCOUNTER — ONCOLOGY VISIT (OUTPATIENT)
Dept: TRANSPLANT | Facility: CLINIC | Age: 61
End: 2021-08-30
Attending: PHYSICIAN ASSISTANT
Payer: COMMERCIAL

## 2021-08-30 ENCOUNTER — LAB (OUTPATIENT)
Dept: LAB | Facility: CLINIC | Age: 61
End: 2021-08-30
Attending: INTERNAL MEDICINE
Payer: COMMERCIAL

## 2021-08-30 ENCOUNTER — OFFICE VISIT (OUTPATIENT)
Dept: TRANSPLANT | Facility: CLINIC | Age: 61
End: 2021-08-30
Attending: STUDENT IN AN ORGANIZED HEALTH CARE EDUCATION/TRAINING PROGRAM
Payer: COMMERCIAL

## 2021-08-30 VITALS
HEART RATE: 99 BPM | WEIGHT: 234.7 LBS | OXYGEN SATURATION: 99 % | TEMPERATURE: 97.9 F | DIASTOLIC BLOOD PRESSURE: 85 MMHG | SYSTOLIC BLOOD PRESSURE: 131 MMHG | BODY MASS INDEX: 31.79 KG/M2 | HEIGHT: 72 IN | RESPIRATION RATE: 16 BRPM

## 2021-08-30 DIAGNOSIS — Z94.81 STATUS POST BONE MARROW TRANSPLANT (H): Primary | ICD-10-CM

## 2021-08-30 DIAGNOSIS — C91.01 ACUTE LYMPHOBLASTIC LEUKEMIA (ALL) IN REMISSION (H): ICD-10-CM

## 2021-08-30 PROCEDURE — 88305 TISSUE EXAM BY PATHOLOGIST: CPT | Mod: 26 | Performed by: PATHOLOGY

## 2021-08-30 PROCEDURE — G0463 HOSPITAL OUTPT CLINIC VISIT: HCPCS

## 2021-08-30 PROCEDURE — 99214 OFFICE O/P EST MOD 30 MIN: CPT | Mod: 25

## 2021-08-30 PROCEDURE — 88305 TISSUE EXAM BY PATHOLOGIST: CPT | Mod: TC

## 2021-08-30 PROCEDURE — 11104 PUNCH BX SKIN SINGLE LESION: CPT

## 2021-08-30 PROCEDURE — 250N000011 HC RX IP 250 OP 636: Performed by: PHYSICIAN ASSISTANT

## 2021-08-30 PROCEDURE — G0463 HOSPITAL OUTPT CLINIC VISIT: HCPCS | Mod: 25

## 2021-08-30 RX ORDER — HEPARIN SODIUM,PORCINE 10 UNIT/ML
5 VIAL (ML) INTRAVENOUS ONCE
Status: COMPLETED | OUTPATIENT
Start: 2021-08-30 | End: 2021-08-30

## 2021-08-30 RX ORDER — TACROLIMUS 1 MG/1
4 CAPSULE ORAL 2 TIMES DAILY
Qty: 240 CAPSULE | Refills: 0 | Status: SHIPPED | OUTPATIENT
Start: 2021-08-30 | End: 2021-09-14

## 2021-08-30 RX ORDER — LIDOCAINE HYDROCHLORIDE AND EPINEPHRINE 10; 10 MG/ML; UG/ML
3 INJECTION, SOLUTION INFILTRATION; PERINEURAL ONCE
Status: DISCONTINUED | OUTPATIENT
Start: 2021-08-30 | End: 2021-08-30 | Stop reason: HOSPADM

## 2021-08-30 RX ORDER — AMLODIPINE BESYLATE 5 MG/1
7.5 TABLET ORAL DAILY
Qty: 45 TABLET | Refills: 0 | Status: SHIPPED | OUTPATIENT
Start: 2021-08-30 | End: 2021-09-14

## 2021-08-30 RX ADMIN — Medication 5 ML: at 08:50

## 2021-08-30 RX ADMIN — Medication 5 ML: at 08:49

## 2021-08-30 ASSESSMENT — MIFFLIN-ST. JEOR: SCORE: 1897.72

## 2021-08-30 ASSESSMENT — PAIN SCALES - GENERAL: PAINLEVEL: MILD PAIN (2)

## 2021-08-30 NOTE — NURSING NOTE
"Oncology Rooming Note    August 30, 2021 9:12 AM   Nik Nava is a 61 year old male who presents for:    Chief Complaint   Patient presents with     Blood Draw     Labs drawn via CVC by RN. VS taken.     Oncology Clinic Visit     ALL     Initial Vitals: /85   Pulse 99   Temp 97.9  F (36.6  C) (Oral)   Resp 16   Ht 1.829 m (6' 0.01\")   Wt 106.5 kg (234 lb 11.2 oz)   SpO2 99%   BMI 31.82 kg/m   Estimated body mass index is 31.82 kg/m  as calculated from the following:    Height as of this encounter: 1.829 m (6' 0.01\").    Weight as of this encounter: 106.5 kg (234 lb 11.2 oz). Body surface area is 2.33 meters squared.  Mild Pain (2) Comment: Data Unavailable   No LMP for male patient.  Allergies reviewed: Yes  Medications reviewed: Yes    Medications: MEDICATION REFILLS NEEDED TODAY. Provider was notified.  Pharmacy name entered into GoPlanit:    [facility] - [place], [place] - [address] [address] [facility] [place], [place] - [phone]    Clinical concerns: Requests multiple medication refills and        Gary Hooks MA            "

## 2021-08-30 NOTE — LETTER
8/30/2021         RE: Nik Nava  [address] [address]        Dear Colleague,    Thank you for referring your patient, Nik Nava, to the [facility] BLOOD AND MARROW TRANSPLANT PROGRAM [place]. Please see a copy of my visit note below.    BMT Skin Biopsy Procedure Note  August 30, 2021 10:25 AM    Indications: rash, r/o gvhd  Discussed with pharmacy, ok to proceed while on xarelto, plt normal.     After informed consent, including risks of procedure, infection and/or bleeding, patient was prepped in the usual sterile manner. Local anesthesia was given with 2 ml of 1% lidocaine with epi. A 4 mm punch biopsy was taken from mid back. 2 stitches were placed with a 4-0 prolene, nonabsorbable suture. Wound was dressed with triple antibiotic ointment and a bandaid.  Patient tolerated the procedure without complications. Patient given Biopsy Information pamphlet.      Very minimal bleeding. Complete cessation of bleeding prior to placement of bandage.    Layla Sahni PA-C      Again, thank you for allowing me to participate in the care of your patient.        Sincerely,        BMT Advanced Practice Provider

## 2021-08-30 NOTE — NURSING NOTE
Chief Complaint   Patient presents with     Blood Draw     Labs drawn via CVC by RN. VS taken.     Labs drawn from CVC by rn. Caps changed. Good blood return noted in both lumens.  Both lumens flushed with NS and heparin.  Pt tolerated well.  VS taken.  Pt checked in for next appt.    Cornelio White RN

## 2021-08-30 NOTE — NURSING NOTE
Pt had dressing changed using Sterile technique. Site is clean and dry. New dressing was applied.       Lynn Weeks MA

## 2021-08-30 NOTE — PROGRESS NOTES
BMT Skin Biopsy Procedure Note  August 30, 2021 10:25 AM    Indications: rash, r/o gvhd  Discussed with pharmacy, ok to proceed while on xarelto, plt normal.     After informed consent, including risks of procedure, infection and/or bleeding, patient was prepped in the usual sterile manner. Local anesthesia was given with 2 ml of 1% lidocaine with epi. A 4 mm punch biopsy was taken from mid back. 2 stitches were placed with a 4-0 prolene, nonabsorbable suture. Wound was dressed with triple antibiotic ointment and a bandaid.  Patient tolerated the procedure without complications. Patient given Biopsy Information pamphlet.      Very minimal bleeding. Complete cessation of bleeding prior to placement of bandage.    CATRACHITA McdonaldC

## 2021-08-30 NOTE — LETTER
"    8/30/2021         RE: Nik Nava  [address] [address]        Dear Colleague,    Thank you for referring your patient, Nik Nava, to the [facility] BLOOD AND MARROW TRANSPLANT PROGRAM [place]. Please see a copy of my visit note below.    BMT Clinic Note    ID:  Nik Nava is a 61 year old male with Ph+ ALL, day +20 s/p sib allo stem cell transplant     CC: Follow-up and rash    Interval History:  Patient returns with his wife today for follow up. The rash that has been noted is about the same. On mid back to shoulders and top of chest. He denies any itching or pain. No bleeding on xarelto. His stomach has been upset often, but he denies nausea or vomiting. No diarrhea. No cough or chest pain, no SOB. Wife notes ankles are a little swollen today.     Review of Systems                                                                                                                                         8pt Review of systems was negative other than noted above.      PHYSICAL EXAM                                                                                                                                                 KPS: 80  /85   Pulse 99   Temp 97.9  F (36.6  C) (Oral)   Resp 16   Ht 1.829 m (6' 0.01\")   Wt 106.5 kg (234 lb 11.2 oz)   SpO2 99%   BMI 31.82 kg/m    General: NAD   HEENT: sclera anicteric.  OP clear  Lungs:  lungs clear  Heart: rrr, no m/r/g  Abd:  SNTND. + BS. No tenderness with palpation  Skin: He has a follicular, fine vesicular rash in his mid front chest.  There is similar but perhaps a slightly more maculopapular rash, on his back  Neuro: A&O, speech normal  EXT:  Compression socks in place  Vascular Access: port in the right chest, left double lumen blake not tender     KPS=90  Current aGVHD staging:  Skin 0, UGI 0, LGI 0, Liver 0 (8/30/2021)    Labs:  Lab Results   Component Value Date    WBC 4.5 08/30/2021    ANEU 2.0 " 08/28/2021    HGB 9.1 (L) 08/30/2021    HCT 27.3 (L) 08/30/2021     08/30/2021     08/30/2021    POTASSIUM 5.3 08/30/2021    CHLORIDE 107 08/30/2021    CO2 24 08/30/2021     (H) 08/30/2021    BUN 44 (H) 08/30/2021    CR 1.19 08/30/2021    MAG 1.6 08/30/2021    INR 1.32 (H) 08/16/2021      OVERALL ASSESSMENT AND PLAN   Nik Nava is a 61 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, 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  Here for flu/cy/TBI and sib allo sct  HCT-CI score: 3 (prior solid tumor)  -Off G-CSF   - day +21 BM BX under sedation. RN coordinator will get scheduled at [facility]. Scheduled on 9/2/2021 on the fifth floor.     2.  HEME: His blood counts are stable and seem to grow slowly continue to recover.  - Pt/donor both Opos  - Transfuse for hgb <7g/dL; plt < 10k.  No transfusions today  - Recent pulmonary emboli: He developed a pulmonary emboli in the setting of receiving PEG asparaginase. On xarelto will continue unless plts <50K. Tcf=725 so continue  - Plts 150 - pt has port-a-cath. Central line removal 9/2 along with bmbx. Pt aware to hold xarelto starting 9/1     3.  FEN/renal: His magnesium is normal today at 1.6  - He has a history of renal cancer and resection. has a single kidney. Creat=1.19  - Potassium rising some, pt using some supplements in protein shake so advised to hold off on this through next visit.     4.    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 Still of limited extension. TCM cream ok to start today (after biopsy).  Skin biopsy today 8/30  Cont Tac/MMF   - On tac 4mg BID, 8/25 level =7.3. Held for level today which is pending     5.  ID: afebrile.  No active infections  - prophy acv/letermovir, fluconazole,  - sulfa allergic; pentamidine at day +28  - CMV negative 8/25.      6.  Hx of graves disease; On synthroid     7. GI: No diarrhea but stomach upset. Cont oral mag for now. May be MMF vs gvh vs other.  - Prilosec for reflux     8. CV: Hypertension. Likely secondary to tacrolimus. BP in normal range, cont on norvasc 7.5mg     Plan: No supplements until Thursday  Call for worsening rash, GI sx or any other new/worsening symtpoms.  RTC Thursday for line removal, bmbx.      Layla Sahni PA-C on 8/28/2021 at 8:54 AM      I spent 30 minutes in the care of this patient today, which included time necessary for preparation for the visit, obtaining history, ordering medications/tests/procedures as medically indicated, review of pertinent medical literature, counseling of the patient, communication of recommendations to the care team, and documentation time.Reviewed and updated medication list today

## 2021-08-30 NOTE — PROGRESS NOTES
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 The rash that has been noted is about the same. On mid back to shoulders and top of chest. He denies any itching or pain. No bleeding on xarelto. His stomach has been upset often, but he denies nausea or vomiting. No diarrhea. No cough or chest pain, no SOB. Wife notes ankles are a little swollen today.     Review of Systems                                                                                                                                         8pt Review of systems was negative other than noted above.      PHYSICAL EXAM                                                                                                                                                 KPS: 80  /85   Pulse 99   Temp 97.9  F (36.6  C) (Oral)   Resp 16   Ht 1.829 m (6' 0.01\")   Wt 106.5 kg (234 lb 11.2 oz)   SpO2 99%   BMI 31.82 kg/m    General: NAD   HEENT: sclera anicteric.  OP clear  Lungs:  lungs clear  Heart: rrr, no m/r/g  Abd:  SNTND. + BS. No tenderness with palpation  Skin: He has a follicular, fine vesicular rash in his mid front chest.  There is similar but perhaps a slightly more maculopapular rash, on his back  Neuro: A&O, speech normal  EXT:  Compression socks in place  Vascular Access: port in the right chest, left double lumen blake not tender     KPS=90  Current aGVHD staging:  Skin 0, UGI 0, LGI 0, Liver 0 (8/30/2021)    Labs:  Lab Results   Component Value Date    WBC 4.5 08/30/2021    ANEU 2.0 08/28/2021    HGB 9.1 (L) 08/30/2021    HCT 27.3 (L) 08/30/2021     08/30/2021     08/30/2021    POTASSIUM 5.3 08/30/2021    CHLORIDE 107 08/30/2021    CO2 24 08/30/2021     (H) 08/30/2021    BUN 44 (H) 08/30/2021    CR 1.19 08/30/2021    MAG 1.6 08/30/2021    INR 1.32 " (H) 08/16/2021      OVERALL ASSESSMENT AND PLAN   Nik Nava is a 61 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, 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  Acute lymphoblastic leukemia, [place] chromosome positive in CR, MRD neg.  Here for flu/cy/TBI and sib allo sct  HCT-CI score: 3 (prior solid tumor)  -Off G-CSF   - day +21 BM BX under sedation. RN coordinator will get scheduled at [facility]. Scheduled on 9/2/2021 on the fifth floor.     2.  HEME: His blood counts are stable and seem to grow slowly continue to recover.  - Pt/donor both Opos  - Transfuse for hgb <7g/dL; plt < 10k.  No transfusions today  - Recent pulmonary emboli: He developed a pulmonary emboli in the setting of receiving PEG asparaginase. On xarelto will continue unless plts <50K. Vql=277 so continue  - Plts 150 - pt has port-a-cath. Central line removal 9/2 along with bmbx. Pt aware to hold xarelto starting 9/1     3.  FEN/renal: His magnesium is normal today at 1.6  - He has a history of renal cancer and resection. has a single kidney. Creat=1.19  - Potassium rising some, pt using some supplements in protein shake so advised to hold off on this through next visit.     4.    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 Still of limited extension. TCM cream ok to start today (after biopsy).  Skin biopsy today 8/30  Cont Tac/MMF   - On tac 4mg BID, 8/25 level =7.3. Held for level today which is pending     5.  ID: afebrile.  No active infections  - prophy acv/letermovir, fluconazole,  - sulfa allergic; pentamidine at day +28  - CMV negative 8/25.      6. Hx of graves disease; On synthroid     7. GI: No diarrhea but stomach upset. Cont oral mag for now. May be MMF vs gvh vs other.  - Prilosec for reflux     8. CV: Hypertension. Likely secondary to tacrolimus. BP in normal range, cont on norvasc 7.5mg       Plan: No supplements until  Thursday  Call for worsening rash, GI sx or any other new/worsening symtpoms.  RTC Thursday for line removal, bmbx.    Layla Sahni PA-C on 8/28/2021 at 8:54 AM      I spent 30 minutes in the care of this patient today, which included time necessary for preparation for the visit, obtaining history, ordering medications/tests/procedures as medically indicated, review of pertinent medical literature, counseling of the patient, communication of recommendations to the care team, and documentation time.Reviewed and updated medication list today

## 2021-08-30 NOTE — LETTER
Date:November 24, 2021      Provider requested that no letter be sent. Do not send.       [facility]

## 2021-08-31 ENCOUNTER — LAB (OUTPATIENT)
Dept: LAB | Facility: CLINIC | Age: 61
End: 2021-08-31
Payer: COMMERCIAL

## 2021-08-31 DIAGNOSIS — Z11.59 ENCOUNTER FOR SCREENING FOR OTHER VIRAL DISEASES: ICD-10-CM

## 2021-08-31 PROCEDURE — U0005 INFEC AGEN DETEC AMPLI PROBE: HCPCS

## 2021-08-31 PROCEDURE — U0003 INFECTIOUS AGENT DETECTION BY NUCLEIC ACID (DNA OR RNA); SEVERE ACUTE RESPIRATORY SYNDROME CORONAVIRUS 2 (SARS-COV-2) (CORONAVIRUS DISEASE [COVID-19]), AMPLIFIED PROBE TECHNIQUE, MAKING USE OF HIGH THROUGHPUT TECHNOLOGIES AS DESCRIBED BY CMS-2020-01-R: HCPCS

## 2021-09-01 ENCOUNTER — ANESTHESIA EVENT (OUTPATIENT)
Dept: SURGERY | Facility: AMBULATORY SURGERY CENTER | Age: 61
End: 2021-09-01
Payer: COMMERCIAL

## 2021-09-01 LAB
PATH REPORT.COMMENTS IMP SPEC: NORMAL
PATH REPORT.FINAL DX SPEC: NORMAL
PATH REPORT.GROSS SPEC: NORMAL
PATH REPORT.MICROSCOPIC SPEC OTHER STN: NORMAL
PATH REPORT.RELEVANT HX SPEC: NORMAL
SARS-COV-2 RNA RESP QL NAA+PROBE: NEGATIVE

## 2021-09-01 RX ORDER — SODIUM CHLORIDE, SODIUM LACTATE, POTASSIUM CHLORIDE, CALCIUM CHLORIDE 600; 310; 30; 20 MG/100ML; MG/100ML; MG/100ML; MG/100ML
INJECTION, SOLUTION INTRAVENOUS CONTINUOUS
Status: CANCELLED | OUTPATIENT
Start: 2021-09-01

## 2021-09-01 RX ORDER — FENTANYL CITRATE 50 UG/ML
25 INJECTION, SOLUTION INTRAMUSCULAR; INTRAVENOUS EVERY 5 MIN PRN
Status: CANCELLED | OUTPATIENT
Start: 2021-09-01

## 2021-09-01 RX ORDER — LIDOCAINE 40 MG/G
CREAM TOPICAL
Status: CANCELLED | OUTPATIENT
Start: 2021-09-01

## 2021-09-01 RX ORDER — ONDANSETRON 2 MG/ML
4 INJECTION INTRAMUSCULAR; INTRAVENOUS EVERY 30 MIN PRN
Status: CANCELLED | OUTPATIENT
Start: 2021-09-01

## 2021-09-01 RX ORDER — ONDANSETRON 4 MG/1
4 TABLET, ORALLY DISINTEGRATING ORAL EVERY 30 MIN PRN
Status: CANCELLED | OUTPATIENT
Start: 2021-09-01

## 2021-09-01 RX ORDER — ACETAMINOPHEN 325 MG/1
975 TABLET ORAL ONCE
Status: CANCELLED | OUTPATIENT
Start: 2021-09-01 | End: 2021-09-01

## 2021-09-01 RX ORDER — HYDROMORPHONE HYDROCHLORIDE 1 MG/ML
0.2 INJECTION, SOLUTION INTRAMUSCULAR; INTRAVENOUS; SUBCUTANEOUS EVERY 5 MIN PRN
Status: CANCELLED | OUTPATIENT
Start: 2021-09-01

## 2021-09-01 RX ORDER — OXYCODONE HYDROCHLORIDE 5 MG/1
5 TABLET ORAL EVERY 4 HOURS PRN
Status: CANCELLED | OUTPATIENT
Start: 2021-09-01

## 2021-09-01 RX ORDER — LIDOCAINE HYDROCHLORIDE 10 MG/ML
8-10 INJECTION, SOLUTION EPIDURAL; INFILTRATION; INTRACAUDAL; PERINEURAL
Status: CANCELLED | OUTPATIENT
Start: 2021-09-01

## 2021-09-02 ENCOUNTER — HOSPITAL ENCOUNTER (OUTPATIENT)
Facility: AMBULATORY SURGERY CENTER | Age: 61
End: 2021-09-02
Attending: PHYSICIAN ASSISTANT
Payer: COMMERCIAL

## 2021-09-02 ENCOUNTER — APPOINTMENT (OUTPATIENT)
Dept: LAB | Facility: CLINIC | Age: 61
End: 2021-09-02
Attending: PHYSICIAN ASSISTANT
Payer: COMMERCIAL

## 2021-09-02 ENCOUNTER — ANESTHESIA (OUTPATIENT)
Dept: SURGERY | Facility: AMBULATORY SURGERY CENTER | Age: 61
End: 2021-09-02
Payer: COMMERCIAL

## 2021-09-02 ENCOUNTER — ANCILLARY PROCEDURE (OUTPATIENT)
Dept: ULTRASOUND IMAGING | Facility: CLINIC | Age: 61
End: 2021-09-02
Attending: PHYSICIAN ASSISTANT
Payer: COMMERCIAL

## 2021-09-02 ENCOUNTER — OFFICE VISIT (OUTPATIENT)
Dept: TRANSPLANT | Facility: CLINIC | Age: 61
End: 2021-09-02
Attending: PHYSICIAN ASSISTANT
Payer: COMMERCIAL

## 2021-09-02 VITALS
OXYGEN SATURATION: 98 % | BODY MASS INDEX: 31.8 KG/M2 | TEMPERATURE: 97.7 F | HEART RATE: 88 BPM | DIASTOLIC BLOOD PRESSURE: 73 MMHG | RESPIRATION RATE: 16 BRPM | SYSTOLIC BLOOD PRESSURE: 120 MMHG | WEIGHT: 234.5 LBS

## 2021-09-02 VITALS
HEART RATE: 80 BPM | TEMPERATURE: 97.6 F | HEIGHT: 72 IN | BODY MASS INDEX: 31.15 KG/M2 | RESPIRATION RATE: 16 BRPM | OXYGEN SATURATION: 98 % | SYSTOLIC BLOOD PRESSURE: 120 MMHG | WEIGHT: 230 LBS | DIASTOLIC BLOOD PRESSURE: 74 MMHG

## 2021-09-02 VITALS — HEART RATE: 90 BPM

## 2021-09-02 DIAGNOSIS — C91.01 ACUTE LYMPHOBLASTIC LEUKEMIA (ALL) IN REMISSION (H): Primary | ICD-10-CM

## 2021-09-02 DIAGNOSIS — Z94.81 STATUS POST BONE MARROW TRANSPLANT (H): ICD-10-CM

## 2021-09-02 DIAGNOSIS — C91.01 ACUTE LYMPHOBLASTIC LEUKEMIA (ALL) IN REMISSION (H): ICD-10-CM

## 2021-09-02 LAB
ALBUMIN SERPL-MCNC: 3.7 G/DL (ref 3.4–5)
ALP SERPL-CCNC: 69 U/L (ref 40–150)
ALT SERPL W P-5'-P-CCNC: 24 U/L (ref 0–70)
ANION GAP SERPL CALCULATED.3IONS-SCNC: 7 MMOL/L (ref 3–14)
AST SERPL W P-5'-P-CCNC: 19 U/L (ref 0–45)
BASOPHILS # BLD AUTO: 0 10E3/UL (ref 0–0.2)
BASOPHILS NFR BLD AUTO: 1 %
BILIRUB SERPL-MCNC: 0.4 MG/DL (ref 0.2–1.3)
BUN SERPL-MCNC: 34 MG/DL (ref 7–30)
CALCIUM SERPL-MCNC: 10.2 MG/DL (ref 8.5–10.1)
CHLORIDE BLD-SCNC: 105 MMOL/L (ref 94–109)
CMV DNA SPEC NAA+PROBE-ACNC: NOT DETECTED IU/ML
CO2 SERPL-SCNC: 26 MMOL/L (ref 20–32)
CREAT SERPL-MCNC: 1.33 MG/DL (ref 0.66–1.25)
EOSINOPHIL # BLD AUTO: 0 10E3/UL (ref 0–0.7)
EOSINOPHIL NFR BLD AUTO: 0 %
ERYTHROCYTE [DISTWIDTH] IN BLOOD BY AUTOMATED COUNT: 15.1 % (ref 10–15)
GFR SERPL CREATININE-BSD FRML MDRD: 56 ML/MIN/1.73M2
GLUCOSE BLD-MCNC: 107 MG/DL (ref 70–99)
HCT VFR BLD AUTO: 27.7 % (ref 40–53)
HGB BLD-MCNC: 9.3 G/DL (ref 13.3–17.7)
IMM GRANULOCYTES # BLD: 0 10E3/UL
IMM GRANULOCYTES NFR BLD: 1 %
INR PPP: 0.97 (ref 0.85–1.15)
LAB DIRECTOR DISCLAIMER: NORMAL
LAB DIRECTOR INTERPRETATION: NORMAL
LAB DIRECTOR METHODOLOGY: NORMAL
LAB DIRECTOR RESULTS: NORMAL
LYMPHOCYTES # BLD AUTO: 0.6 10E3/UL (ref 0.8–5.3)
LYMPHOCYTES NFR BLD AUTO: 14 %
MAGNESIUM SERPL-MCNC: 1.6 MG/DL (ref 1.6–2.3)
MCH RBC QN AUTO: 33 PG (ref 26.5–33)
MCHC RBC AUTO-ENTMCNC: 33.6 G/DL (ref 31.5–36.5)
MCV RBC AUTO: 98 FL (ref 78–100)
MDL NUMBER: NORMAL
MONOCYTES # BLD AUTO: 0.9 10E3/UL (ref 0–1.3)
MONOCYTES NFR BLD AUTO: 21 %
NEUTROPHILS # BLD AUTO: 2.5 10E3/UL (ref 1.6–8.3)
NEUTROPHILS NFR BLD AUTO: 63 %
NRBC # BLD AUTO: 0 10E3/UL
NRBC BLD AUTO-RTO: 0 /100
PLAT MORPH BLD: NORMAL
PLATELET # BLD AUTO: 172 10E3/UL (ref 150–450)
POTASSIUM BLD-SCNC: 4.6 MMOL/L (ref 3.4–5.3)
PROT SERPL-MCNC: 7.3 G/DL (ref 6.8–8.8)
RBC # BLD AUTO: 2.82 10E6/UL (ref 4.4–5.9)
RBC MORPH BLD: NORMAL
SODIUM SERPL-SCNC: 138 MMOL/L (ref 133–144)
SPECIMEN DESCRIPTION: NORMAL
WBC # BLD AUTO: 4 10E3/UL (ref 4–11)

## 2021-09-02 PROCEDURE — 88311 DECALCIFY TISSUE: CPT | Mod: TC | Performed by: PHYSICIAN ASSISTANT

## 2021-09-02 PROCEDURE — 88185 FLOWCYTOMETRY/TC ADD-ON: CPT | Performed by: PHYSICIAN ASSISTANT

## 2021-09-02 PROCEDURE — 88188 FLOWCYTOMETRY/READ 9-15: CPT | Performed by: PATHOLOGY

## 2021-09-02 PROCEDURE — G0463 HOSPITAL OUTPT CLINIC VISIT: HCPCS

## 2021-09-02 PROCEDURE — 88311 DECALCIFY TISSUE: CPT | Mod: 26 | Performed by: PATHOLOGY

## 2021-09-02 PROCEDURE — 36589 REMOVAL TUNNELED CV CATH: CPT | Performed by: PHYSICIAN ASSISTANT

## 2021-09-02 PROCEDURE — 85097 BONE MARROW INTERPRETATION: CPT | Performed by: PATHOLOGY

## 2021-09-02 PROCEDURE — 88184 FLOWCYTOMETRY/ TC 1 MARKER: CPT | Performed by: PATHOLOGY

## 2021-09-02 PROCEDURE — 88291 CYTO/MOLECULAR REPORT: CPT | Performed by: MEDICAL GENETICS

## 2021-09-02 PROCEDURE — 36592 COLLECT BLOOD FROM PICC: CPT

## 2021-09-02 PROCEDURE — 81267 CHIMERISM ANAL NO CELL SELEC: CPT

## 2021-09-02 PROCEDURE — 88237 TISSUE CULTURE BONE MARROW: CPT

## 2021-09-02 PROCEDURE — 88275 CYTOGENETICS 100-300: CPT

## 2021-09-02 PROCEDURE — 85060 BLOOD SMEAR INTERPRETATION: CPT | Performed by: PATHOLOGY

## 2021-09-02 PROCEDURE — 88313 SPECIAL STAINS GROUP 2: CPT | Mod: 26 | Performed by: PATHOLOGY

## 2021-09-02 PROCEDURE — 88368 INSITU HYBRIDIZATION MANUAL: CPT | Mod: 26 | Performed by: MEDICAL GENETICS

## 2021-09-02 PROCEDURE — 82040 ASSAY OF SERUM ALBUMIN: CPT

## 2021-09-02 PROCEDURE — 85025 COMPLETE CBC W/AUTO DIFF WBC: CPT

## 2021-09-02 PROCEDURE — 88184 FLOWCYTOMETRY/ TC 1 MARKER: CPT | Performed by: PHYSICIAN ASSISTANT

## 2021-09-02 PROCEDURE — G0452 MOLECULAR PATHOLOGY INTERPR: HCPCS | Performed by: PATHOLOGY

## 2021-09-02 PROCEDURE — 99215 OFFICE O/P EST HI 40 MIN: CPT | Mod: 25

## 2021-09-02 PROCEDURE — 88271 CYTOGENETICS DNA PROBE: CPT

## 2021-09-02 PROCEDURE — 38222 DX BONE MARROW BX & ASPIR: CPT | Mod: LT

## 2021-09-02 PROCEDURE — 250N000011 HC RX IP 250 OP 636: Performed by: PHYSICIAN ASSISTANT

## 2021-09-02 PROCEDURE — 85610 PROTHROMBIN TIME: CPT

## 2021-09-02 PROCEDURE — 83735 ASSAY OF MAGNESIUM: CPT

## 2021-09-02 RX ORDER — HEPARIN SODIUM (PORCINE) LOCK FLUSH IV SOLN 100 UNIT/ML 100 UNIT/ML
5 SOLUTION INTRAVENOUS ONCE
Status: COMPLETED | OUTPATIENT
Start: 2021-09-02 | End: 2021-09-02

## 2021-09-02 RX ORDER — OXYCODONE HYDROCHLORIDE 5 MG/1
TABLET ORAL
COMMUNITY
End: 2021-10-19

## 2021-09-02 RX ORDER — PROPOFOL 10 MG/ML
INJECTION, EMULSION INTRAVENOUS CONTINUOUS PRN
Status: DISCONTINUED | OUTPATIENT
Start: 2021-09-02 | End: 2021-09-02

## 2021-09-02 RX ORDER — ALBUTEROL SULFATE 5 MG/ML
2.5 SOLUTION RESPIRATORY (INHALATION) EVERY 6 HOURS PRN
Status: CANCELLED
Start: 2021-09-02

## 2021-09-02 RX ORDER — MYCOPHENOLATE MOFETIL 500 MG/1
1500 TABLET ORAL EVERY 12 HOURS
Qty: 42 TABLET | Refills: 0 | Status: SHIPPED | OUTPATIENT
Start: 2021-09-02 | End: 2021-09-17

## 2021-09-02 RX ORDER — PENTAMIDINE ISETHIONATE 300 MG/300MG
300 INHALANT RESPIRATORY (INHALATION)
Status: CANCELLED
Start: 2021-09-02

## 2021-09-02 RX ORDER — SODIUM CHLORIDE, SODIUM LACTATE, POTASSIUM CHLORIDE, CALCIUM CHLORIDE 600; 310; 30; 20 MG/100ML; MG/100ML; MG/100ML; MG/100ML
500 INJECTION, SOLUTION INTRAVENOUS CONTINUOUS
Status: CANCELLED | OUTPATIENT
Start: 2021-09-02

## 2021-09-02 RX ORDER — PENTAMIDINE ISETHIONATE 300 MG/300MG
300 INHALANT RESPIRATORY (INHALATION)
Status: CANCELLED
Start: 2021-09-07

## 2021-09-02 RX ORDER — ALBUTEROL SULFATE 5 MG/ML
2.5 SOLUTION RESPIRATORY (INHALATION) EVERY 6 HOURS PRN
Status: CANCELLED
Start: 2021-09-07

## 2021-09-02 RX ORDER — LIDOCAINE 40 MG/G
CREAM TOPICAL
Status: CANCELLED | OUTPATIENT
Start: 2021-09-02

## 2021-09-02 RX ORDER — HEPARIN SODIUM,PORCINE 10 UNIT/ML
5 VIAL (ML) INTRAVENOUS ONCE
Status: COMPLETED | OUTPATIENT
Start: 2021-09-02 | End: 2021-09-02

## 2021-09-02 RX ORDER — LIDOCAINE HYDROCHLORIDE 10 MG/ML
5 INJECTION, SOLUTION EPIDURAL; INFILTRATION; INTRACAUDAL; PERINEURAL ONCE
Status: COMPLETED | OUTPATIENT
Start: 2021-09-02 | End: 2021-09-02

## 2021-09-02 RX ADMIN — PROPOFOL 150 MCG/KG/MIN: 10 INJECTION, EMULSION INTRAVENOUS at 10:54

## 2021-09-02 RX ADMIN — LIDOCAINE HYDROCHLORIDE 5 ML: 10 INJECTION, SOLUTION EPIDURAL; INFILTRATION; INTRACAUDAL; PERINEURAL at 14:10

## 2021-09-02 RX ADMIN — Medication 5 ML: at 07:21

## 2021-09-02 RX ADMIN — Medication 5 ML: at 07:33

## 2021-09-02 ASSESSMENT — PAIN SCALES - GENERAL: PAINLEVEL: NO PAIN (0)

## 2021-09-02 ASSESSMENT — MIFFLIN-ST. JEOR: SCORE: 1876.27

## 2021-09-02 NOTE — DISCHARGE INSTRUCTIONS
A collaboration between [facility] and [facility]  Experts in minimally invasive, targeted treatments performed using imaging guidance    Tunneled Central Venous Catheter Removal    Today you had your existing tunneled central venous catheter removed because it was no longer needed for treatment.    Your catheter was removed by    After you go home:  - Avoid lying flat or bending at the waist for 2 hours following removal of the catheter to reduce risk of bleeding from catheter site.  - Keep any applied tape/gauze dressings clean and dry. Change tape/gauze dressings if they get wet or soiled.  - You may shower following the procedure, however cover and protect from moisture any tape/gauze dressings.   - You may remove tape/gauze dressings after 3 days if the site looks like it is in the process of healing or scabbing over.  - Avoid strenuous activities (elevating heart rate) or lifting more than 10 pounds for the next 3 days. Walking, elliptical and golf are examples of acceptable activities.  - If there is bleeding or oozing from the procedure site, apply firm pressure to the area above your collar bone (where the catheter entered the bloodstream) for 10 minutes.  If the bleeding continues, continue to hold pressure and seek medical attention at the phone numbers below.  - Mild procedure site discomfort can be treated with an ice pack and over-the-counter pain relievers.           Call our Interventional Radiology (IR) service if:  - If you start bleeding from the procedure site. Our radiology provider can help you decide if you need to return to the hospital.  - If you have new or worsening pain related to the procedure.  - If you have concerning swelling at the procedure site.  - If you develop hives or a rash or any unexplained itching.  - If you have a fever of greater than 100.5  F and chills in the first 5 days after procedure.  - Any other concerns  related to your procedure.    Contact Number:  [phone]  ([employer] control desk)  - Monday - Friday 8:00 am - 4:30 pm    After hours for urgent concerns:  [phone]  - After 4:30 pm Monday - Friday, Weekends and Holidays.   - Ask for Interventional Radiology on-call.  Someone is available 24 hours a day.  - [facility] toll free number:  [phone]

## 2021-09-02 NOTE — LETTER
9/2/2021         RE: Nik Nava  [address] [address]        Dear Colleague,    Thank you for referring your patient, Nik Nava, to the [facility]ARROW [facility]PL[facility] [place]. Please see a copy of my visit note below.    BMT ONC Adult Bone Marrow Biopsy Procedure Note  September 2, 2021  There were no vitals taken for this visit.     Learning needs assessment complete within 12 months? YES    DIAGNOSIS: ALL     PROCEDURE: Unilateral Bone Marrow Biopsy and Unilateral Aspirate    LOCATION: [facility] 5th floor-Procedure Room    Patient s identification was positively verified by verbal identification and invasive procedure safety checklist was completed. Informed consent was obtained. Following the administration of Propofol as pre-medication, patient was placed in the prone position and prepped and draped in a sterile manner. Approximately 10 cc of 1% Lidocaine was used over the left posterior iliac spine. Following this a 3 mm incision was made. Trephine bone marrow core(s) was (were) obtained from the [facility]. Bone marrow aspirates were obtained from the [facility]. Aspirates were sent for morphology, immunophenotyping, cytogenetics and molecular diagnostics . A total of approximately 20 ml of marrow was aspirated. Following this procedure a sterile dressing was applied to the bone marrow biopsy site(s). The patient was placed in the supine position to maintain pressure on the biopsy site. Post-procedure wound care instructions were given.     Complications: NO    Pre-procedural pain: 0 out of 10 on the numeric pain rating scale.     Procedural pain: unable to assess due to sedation     Post-procedural pain assessment: unable to assess due to sedation     Interventions: NO    Length of procedure:20 minutes or less      Procedure performed by: Jailyn Ny        Again, thank you for allowing me to participate in the care of your patient.         Sincerely,        [facility]ARROW [facility]

## 2021-09-02 NOTE — ANESTHESIA PREPROCEDURE EVALUATION
Anesthesia Pre-Procedure Evaluation    Patient: Nik Nava   MRN: [id number] : 1960        Preoperative Diagnosis: 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) [C91.01]  Status post bone marrow transplant (H) [Z94.81]   Procedure : Procedure(s):  BIOPSY, BONE MARROW     Past Medical History:   Diagnosis Date     Cancer (H)     renal cell     CKD (chronic kidney disease)      Thyroid disease       Past Surgical History:   Procedure Laterality Date     BACK SURGERY  2017     IR CVC TUNNEL PLACEMENT > 5 YRS OF AGE  2021      Allergies   Allergen Reactions     Sulfamethoxazole W/Trimethoprim Rash      Social History     Tobacco Use     Smoking status: Former Smoker     Packs/day: 2.00     Years: 30.00     Pack years: 60.00     Quit date: 2019     Years since quittin.3     Smokeless tobacco: Never Used   Substance Use Topics     Alcohol use: Not Currently      Wt Readings from Last 1 Encounters:   21 104.3 kg (230 lb)        Anesthesia Evaluation            ROS/MED HX  ENT/Pulmonary:       Neurologic:       Cardiovascular:     (+) hypertension-----Previous cardiac testing   Echo: Date: 21 Results:  Interpretation Summary  Global and regional left ventricular function is normal with an EF of 55-60%.  3D LVEF volumetric analysis is 59%.  Global peak LV longitudinal strain is averaged at -20%. This is normal (normal  <-18%).  Global right ventricular function is normal. The right ventricle is normal  size.  No significant valvular abnormalities.  IVC diameter <2.1 cm collapsing >50% with sniff suggests a normal RA pressure  of 3 mmHg.  There is no prior study for direct comparison.  Stress Test: Date: Results:    ECG Reviewed: Date: Results:    Cath: Date: Results:      METS/Exercise Tolerance:     Hematologic:     (+) anemia,     Musculoskeletal:       GI/Hepatic:       Renal/Genitourinary:     (+) renal disease, type: CRI,     Endo:     (+) thyroid problem, hypothyroidism,  Obesity,     Psychiatric/Substance Use:       Infectious Disease:       Malignancy: Comment: Renal cancer, ALL      Other:            Physical Exam    Airway        Mallampati: I   TM distance: > 3 FB   Neck ROM: full   Mouth opening: > 3 cm    Respiratory Devices and Support         Dental  no notable dental history         Cardiovascular   cardiovascular exam normal          Pulmonary   pulmonary exam normal                OUTSIDE LABS:  CBC:   Lab Results   Component Value Date    WBC 4.0 09/02/2021    WBC 4.5 08/30/2021    HGB 9.3 (L) 09/02/2021    HGB 9.1 (L) 08/30/2021    HCT 27.7 (L) 09/02/2021    HCT 27.3 (L) 08/30/2021     09/02/2021     08/30/2021     BMP:   Lab Results   Component Value Date     09/02/2021     08/30/2021    POTASSIUM 4.6 09/02/2021    POTASSIUM 5.3 08/30/2021    CHLORIDE 105 09/02/2021    CHLORIDE 107 08/30/2021    CO2 26 09/02/2021    CO2 24 08/30/2021    BUN 34 (H) 09/02/2021    BUN 44 (H) 08/30/2021    CR 1.33 (H) 09/02/2021    CR 1.19 08/30/2021     (H) 09/02/2021     (H) 08/30/2021     COAGS:   Lab Results   Component Value Date    PTT 28 08/03/2021    INR 0.97 09/02/2021     POC: No results found for: BGM, HCG, HCGS  HEPATIC:   Lab Results   Component Value Date    ALBUMIN 3.7 09/02/2021    PROTTOTAL 7.3 09/02/2021    ALT 24 09/02/2021    AST 19 09/02/2021    ALKPHOS 69 09/02/2021    BILITOTAL 0.4 09/02/2021     OTHER:   Lab Results   Component Value Date    LACT 0.4 (L) 08/17/2021    DAMON 10.2 (H) 09/02/2021    PHOS 3.5 08/22/2021    MAG 1.6 09/02/2021       Anesthesia Plan    ASA Status:  3   NPO Status:  NPO Appropriate    Anesthesia Type: MAC.     - Reason for MAC: chronic cardiopulmonary disease, straight local not clinically adequate   Induction: Intravenous.   Maintenance: TIVA.        Consents    Anesthesia Plan(s) and associated risks, benefits, and realistic alternatives discussed. Questions answered and patient/representative(s)  expressed understanding.     - Discussed with:  Patient         Postoperative Care    Pain management: IV analgesics, Oral pain medications, Multi-modal analgesia.   PONV prophylaxis: Background Propofol Infusion     Comments:    Pt was chewing gum in preop at 9:00. His procedure will be performed after another patient.             LEANDRO BECK MD

## 2021-09-02 NOTE — ANESTHESIA CARE TRANSFER NOTE
Patient: Nik Nava    Procedure(s):  BIOPSY, BONE MARROW    Diagnosis: 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) [C91.01]  Status post bone marrow transplant (H) [Z94.81]  Diagnosis Additional Information: No value filed.    Anesthesia Type:   MAC     Note:    Oropharynx: oropharynx clear of all foreign objects  Level of Consciousness: awake  Oxygen Supplementation: nasal cannula    Independent Airway: airway patency satisfactory and stable  Dentition: dentition unchanged  Vital Signs Stable: post-procedure vital signs reviewed and stable  Report to RN Given: handoff report given  Patient transferred to: Phase II    Handoff Report: Identifed the Patient, Identified the Reponsible Provider, Reviewed the pertinent medical history, Discussed the surgical course, Reviewed Intra-OP anesthesia mangement and issues during anesthesia, Set expectations for post-procedure period and Allowed opportunity for questions and acknowledgement of understanding      Vitals:  Vitals Value Taken Time   BP     Temp     Pulse     Resp     SpO2         Electronically Signed By: LEANDRO BECK MD  September 2, 2021  11:27 AM

## 2021-09-02 NOTE — LETTER
9/2/2021         RE: Nik Nava  [address] [address]        Dear Colleague,    Thank you for referring your patient, Nik Nava, to the [facility] BLOOD AND MARROW TRANSPLANT PROGRAM [place]. Please see a copy of my visit note below.    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 Doing well. Rash is improving with topical steroids. Only on upper chest and upper back and quite faint. No new GI symptoms. Has chronic stomach discomfort - not new. No nausea. Normal stools. Has tried simethicone, but didn't work. Wants to wait until off MMF to try something else - offered bentyl. No bleeding. Held xarelto for bmbx and line removal.      Review of Systems                                                                                                                                         8pt Review of systems was negative other than noted above.      PHYSICAL EXAM                                                                                                                                                 KPS: 80  There were no vitals taken for this visit. Vitals reviewed  General: NAD   HEENT: sclera anicteric. OP clear  Lungs: CTAB  Heart: RRR  Abd:  + BS. Soft. No tenderness with palpation  Skin: Faint erythematous maculopapular rash on upper chest and upper back - improved per patient and wife  Neuro: A&O, speech normal  EXT:  Compression socks in place  Vascular Access: port in the right chest, left double lumen blake not tender     KPS=90  Current aGVHD staging:  Skin 1, UGI 0, LGI 0, Liver 0 (9/2/2021)    Labs:  Lab Results   Component Value Date    WBC 4.0 09/02/2021    ANEU 2.0 08/28/2021    HGB 9.3 (L) 09/02/2021    HCT 27.7 (L) 09/02/2021     09/02/2021     09/02/2021    POTASSIUM 4.6 09/02/2021    CHLORIDE  105 09/02/2021    CO2 24 08/30/2021     (H) 08/30/2021    BUN 44 (H) 08/30/2021    CR 1.19 08/30/2021    MAG 1.6 09/02/2021    INR 0.97 09/02/2021      OVERALL ASSESSMENT AND PLAN   Nik Nava is a 61 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, day +23 s/p sib allo stem cell transplant    Day -6 (8/4): flu/cy  Day -5 through day -2 (8/5-8/8): flu  Day -1 (8/9): TBI  Day 0 (8/10): transplant     1.  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  Here for flu/cy/TBI and sib allo sct  HCT-CI score: 3 (prior solid tumor)  - Off G-CSF   - day +21 BM BX under sedation. Scheduled today 9/2, follow up 9/7     2.  HEME:   - Pt/donor both Opos  - Transfuse for hgb <7g/dL; plt < 10k.  No transfusions needs  - Recent pulmonary emboli: He developed a pulmonary emboli in the setting of receiving PEG asparaginase. On xarelto will continue unless plts <50K. Held for bmbx and line removal. Ok to resume tonight     3.  FEN/renal:   - hypomag on tac. Taking PO mag 4 tabs daily. Mag ok today at 1.6, no changes  - He has a history of renal cancer and resection. has a single kidney.     4.    GVHD:   - Faint rash on back and chest. Skin bx 8/30 = suggestive of GVHD. Using topical TMC and improving  - Cont Tac/MMF   - On tac 4mg BID. Level on 8/30 = 8.2, no change to dose. Repeat on Tues 9/7  - MMF through D+30 - didn't have enough to get to D+30 = 9/9, so sent 7 more days worth of drug to pharmacy today. Last dose 9/9, stop 9/10    5.  ID: afebrile.  No active infections  - prophy acv/letermovir, fluconazole,  - sulfa allergic; pentamidine at day +28 - requested in haled pentamidine on 9/7 in PFT clinic  - CMV negative 8/25.      6. Hx of graves disease; On synthroid     7. GI:   - Dyspepsia, describes it like a nervous stomach. No diarrhea. No nausea. Cont oral mag for now. May be MMF vs gvh vs other. If no better off MMF, can try bentyl  - Prilosec for reflux     8. CV: Hypertension. Likely secondary to  tacrolimus. BP in normal range, cont on norvasc 7.5mg     RTC   - Sat for labs/provider  - Tues DOM follow up on bmbx results, tac level, pentamidine in PFT clinic    I spent 40 minutes in the care of this patient today, which included time necessary for preparation for the visit, obtaining history, ordering medications/tests/procedures as medically indicated, review of pertinent medical literature, counseling of the patient, communication of recommendations to the care team, and documentation time. Reviewed and updated medication list today    Natty Calderón PA-C  [phone]            Again, thank you for allowing me to participate in the care of your patient.        Sincerely,        BMT Advanced Practice Provider

## 2021-09-02 NOTE — PROGRESS NOTES
Interventional Radiology Brief Post Procedure Note    Procedure: IR TCVC Removal    Proceduralist: Cornelio Rodrigues PA-C    Assistant: None    Time Out: Prior to the start of the procedure and with procedural staff participation, I verbally confirmed the patient s identity using two indicators, relevant allergies, that the procedure was appropriate and matched the consent or emergent situation, and that the correct equipment/implants were available. Immediately prior to starting the procedure I conducted the Time Out with the procedural staff and re-confirmed the patient s name, procedure, and site/side. (The Joint Commission universal protocol was followed.)  Yes    Medications   Medication Event Details Admin User Admin Time       Sedation: None. Local Anesthestic used    Findings: Completed removal of 9.5 Turkish, 32.5 cm double lumen tunneled central venous catheter via left IJ. Catheter removed in its entirety patient tolerated the procedure well. No immediate complication.     Estimated Blood Loss: Minimal    SPECIMENS: None    Complications: 1. None     Condition: Stable    Plan: Follow-up per primary team.     Comments: See dictated procedure note for full details.    Cornelio Rodrigues PA-C

## 2021-09-02 NOTE — PROGRESS NOTES
Patient Name: Nik Nava  Patient MRN: [id number]  Patient : 1960    Abbreviated H&P and Pre-sedation Assessment for bone marrow biopsy and aspirate (procedure name) with sedation    Chief complaint and/or reason for Procedure: ALL    Planned level of sedation: MAC with local    History of problems with sedation: (patient or family hx): No    ASA Assessment Category: 3 - Severe systemic disease, but not incapacitating    History of sleep apnea: Yes; no CPAP use    History of blood thinners: Yes, xarelto last dose was 8/31pm. None  or     Appropriate NPO status: Yes  - food last night 6pm  - black coffee this morning last sip at 5:45am    Current tobacco use: Yes; nicorette gum    Any recent fever, cough, chest or sinus congestion, SOB, weight loss, chest pain, diarrhea or constipation. Chronic abdominal pain post meds    Medications   Currently Scheduled Medications     heparin  5 mL Intravenous Once     saline flush  20 mL Intracatheter Once       Home Med List)  (Not in a hospital admission)      Allergies  Sulfamethoxazole w/trimethoprim    PMH:  Past Medical History:   Diagnosis Date     Cancer (H)     renal cell     CKD (chronic kidney disease)      Thyroid disease    Graves disease  Pancreatitis - chemo related  PE bilateral    Past Surgical History:   Procedure Laterality Date     BACK SURGERY  2017     IR CVC TUNNEL PLACEMENT > 5 YRS OF AGE  2021   bilateral shoulder surgery - rotator cuff ~ 15 years ago  Knee - arthroscopy  Appendectomy in your 20s    Focused Physical exam pertinent to procedure:          (Details of heart, lung, ASA assessment and mallampati assessment in pre procedure assessment flowsheet)  General- healthy,alert,no distress   Recent vital signs-  /73 (BP Location: Right arm, Patient Position: Sitting, Cuff Size: Adult Regular)   Pulse 88   Temp 97.7  F (36.5  C) (Oral)   Resp 16   Wt 106.4 kg (234 lb 8 oz)   SpO2 98%   BMI 31.80 kg/m    HEART-regular  rate and rhythm and no murmurs, gallops, or rub  LUNGS-Clear to Ausculation  OROPHARYNGEAL - MALLAMPATTI- Class II (visualization of the soft palate, fauces, and uvula)    A/P: Reviewed history, medications, allergies, clinical information and pre procedure assessment. The patient was informed of the risks and benefits of the procedure.  They would like to proceed.  Nik Nava is approved for use of sedation during their procedure as noted above.    Ok to proceed with biopsy, anesthesia to assess in pre-op suite.    Natty Calderón PA-C

## 2021-09-02 NOTE — NURSING NOTE
Chief Complaint   Patient presents with     Blood Draw     Labs drawn from CVC by RN in lab. VS taken.      Port Flush     Port flushed in lab by RN with saline and heparin     Labs collected from CVC by RN, line flushed with saline and heparin. Port flushed with heparin and saline, then deaccessed in lab.  Vitals taken. Pt checked in for appointment(s).  Sahil Philip RN

## 2021-09-02 NOTE — PROGRESS NOTES
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 Doing well. Rash is improving with topical steroids. Only on upper chest and upper back and quite faint. No new GI symptoms. Has chronic stomach discomfort - not new. No nausea. Normal stools. Has tried simethicone, but didn't work. Wants to wait until off MMF to try something else - offered bentyl. No bleeding. Held xarelto for bmbx and line removal.      Review of Systems                                                                                                                                         8pt Review of systems was negative other than noted above.      PHYSICAL EXAM                                                                                                                                                 KPS: 80  There were no vitals taken for this visit. Vitals reviewed  General: NAD   HEENT: sclera anicteric. OP clear  Lungs: CTAB  Heart: RRR  Abd:  + BS. Soft. No tenderness with palpation  Skin: Faint erythematous maculopapular rash on upper chest and upper back - improved per patient and wife  Neuro: A&O, speech normal  EXT:  Compression socks in place  Vascular Access: port in the right chest, left double lumen blake not tender     KPS=90  Current aGVHD staging:  Skin 1, UGI 0, LGI 0, Liver 0 (9/2/2021)    Labs:  Lab Results   Component Value Date    WBC 4.0 09/02/2021    ANEU 2.0 08/28/2021    HGB 9.3 (L) 09/02/2021    HCT 27.7 (L) 09/02/2021     09/02/2021     09/02/2021    POTASSIUM 4.6 09/02/2021    CHLORIDE 105 09/02/2021    CO2 24 08/30/2021     (H) 08/30/2021    BUN 44 (H) 08/30/2021    CR 1.19 08/30/2021    MAG 1.6 09/02/2021    INR 0.97 09/02/2021      OVERALL ASSESSMENT AND PLAN   Nik Nava is a 61 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, 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  Acute lymphoblastic leukemia, [place] chromosome positive in CR, MRD neg.  Here for flu/cy/TBI and sib allo sct  HCT-CI score: 3 (prior solid tumor)  - Off G-CSF   - day +21 BM BX under sedation. Scheduled today 9/2, follow up 9/7     2.  HEME:   - Pt/donor both Opos  - Transfuse for hgb <7g/dL; plt < 10k.  No transfusions needs  - Recent pulmonary emboli: He developed a pulmonary emboli in the setting of receiving PEG asparaginase. On xarelto will continue unless plts <50K. Held for bmbx and line removal. Ok to resume tonight     3.  FEN/renal:   - hypomag on tac. Taking PO mag 4 tabs daily. Mag ok today at 1.6, no changes  - He has a history of renal cancer and resection. has a single kidney.     4.    GVHD:   - Faint rash on back and chest. Skin bx 8/30 = suggestive of GVHD. Using topical TMC and improving  - Cont Tac/MMF   - On tac 4mg BID. Level on 8/30 = 8.2, no change to dose. Repeat on Tues 9/7  - MMF through D+30 - didn't have enough to get to D+30 = 9/9, so sent 7 more days worth of drug to pharmacy today. Last dose 9/9, stop 9/10    5.  ID: afebrile.  No active infections  - prophy acv/letermovir, fluconazole,  - sulfa allergic; pentamidine at day +28 - requested in haled pentamidine on 9/7 in PFT clinic  - CMV negative 8/25.      6. Hx of graves disease; On synthroid     7. GI:   - Dyspepsia, describes it like a nervous stomach. No diarrhea. No nausea. Cont oral mag for now. May be MMF vs gvh vs other. If no better off MMF, can try bentyl  - Prilosec for reflux     8. CV: Hypertension. Likely secondary to tacrolimus. BP in normal range, cont on norvasc 7.5mg     RTC   - Sat for labs/provider  - Tues DOM follow up on bmbx results, tac level, pentamidine in PFT clinic    I spent 40 minutes in the care of this patient today, which included time necessary for preparation for the  visit, obtaining history, ordering medications/tests/procedures as medically indicated, review of pertinent medical literature, counseling of the patient, communication of recommendations to the care team, and documentation time. Reviewed and updated medication list today    Natty Calderón PA-C  [phone]

## 2021-09-02 NOTE — LETTER
2021         RE: Nik Nava  [address] [facility] [address]        Dear Colleague,    Thank you for referring your patient, Nik Nava, to the [facility] BLOOD AND MARROW TRANSPLANT PROGRAM [place]. Please see a copy of my visit note below.    Patient Name: Nik Nava  Patient MRN: [id number]  Patient : 1960    Abbreviated H&P and Pre-sedation Assessment for bone marrow biopsy and aspirate (procedure name) with sedation    Chief complaint and/or reason for Procedure: ALL    Planned level of sedation: MAC with local    History of problems with sedation: (patient or family hx): No    ASA Assessment Category: 3 - Severe systemic disease, but not incapacitating    History of sleep apnea: Yes; no CPAP use    History of blood thinners: Yes, xarelto last dose was 8/31pm. None  or     Appropriate NPO status: Yes  - food last night 6pm  - black coffee this morning last sip at 5:45am    Current tobacco use: Yes; nicorette gum    Any recent fever, cough, chest or sinus congestion, SOB, weight loss, chest pain, diarrhea or constipation. Chronic abdominal pain post meds    Medications   Currently Scheduled Medications     heparin  5 mL Intravenous Once     saline flush  20 mL Intracatheter Once       Home Med List)  (Not in a hospital admission)      Allergies  Sulfamethoxazole w/trimethoprim    PMH:  Past Medical History:   Diagnosis Date     Cancer (H)     renal cell     CKD (chronic kidney disease)      Thyroid disease    Graves disease  Pancreatitis - chemo related  PE bilateral    Past Surgical History:   Procedure Laterality Date     BACK SURGERY  2017     IR CVC TUNNEL PLACEMENT > 5 YRS OF AGE  2021   bilateral shoulder surgery - rotator cuff ~ 15 years ago  Knee - arthroscopy  Appendectomy in your 20s    Focused Physical exam pertinent to procedure:          (Details of heart, lung, ASA assessment and mallampati assessment in pre procedure assessment  flowsheet)  General- healthy,alert,no distress   Recent vital signs-  /73 (BP Location: Right arm, Patient Position: Sitting, Cuff Size: Adult Regular)   Pulse 88   Temp 97.7  F (36.5  C) (Oral)   Resp 16   Wt 106.4 kg (234 lb 8 oz)   SpO2 98%   BMI 31.80 kg/m    HEART-regular rate and rhythm and no murmurs, gallops, or rub  LUNGS-Clear to Ausculation  OROPHARYNGEAL - MALLAMPATTI- Class II (visualization of the soft palate, fauces, and uvula)    A/P: Reviewed history, medications, allergies, clinical information and pre procedure assessment. The patient was informed of the risks and benefits of the procedure.  They would like to proceed.  Nik Nava is approved for use of sedation during their procedure as noted above.    Ok to proceed with biopsy, anesthesia to assess in pre-op suite.    Natty Calderón PA-C        Again, thank you for allowing me to participate in the care of your patient.        Sincerely,        BMT Advanced Practice Provider

## 2021-09-02 NOTE — DISCHARGE INSTRUCTIONS
How to Care for your Bone Marrow Biopsy    Activity  Relax and take it easy for the next 24 hours.   Resume regular activity after 24 hours.    Diet   Resume pre-procedure diet and drink plenty of fluids.    If you received sedation, you may feel a little nauseated so start with a clear liquid diet until the nausea passes.    Do Not Immerse Bone Marrow Biopsy Puncture Site in Water  Do not take a bath until the puncture site has healed.  Do not sit in a hot tub or spa until the puncture site has healed.  Do not swim until the puncture site has healed.  Wait 24 hours before taking a shower.    Drainage  Drainage should be minimal.  IF bleeding should occur and soaks through the dressing, lie down and put pressure on the puncture site.    IF bleeding persists, apply gentle pressure with your hand over the dressing for 5 minutes.    IF the pressure doesn't stop the bleeding, contact your provider immediately.    Dressing  Keep the dressing dry and in place for 24 hours, unless instructed otherwise.    IF bleeding soaks through the dressing in the first 24 hours do NOT remove the dressing as you may pull off any scab that has formed.  Instead, reinforce the dressing with extra gauze and tape.    No Alcohol  Do not drink alcoholic beverages for the next 24 hours.    No Driving or Operating Machinery  No driving or operating machinery for the next 24 hours.    Notify your provider IF:    Excessive bleeding or drainage at the puncture site    Excessive swelling, redness or tenderness at the puncture site    Fever above 100.5 degrees taken orally    Severe pain    Drainage that is green, yellow, thick white or has a bad odor    Telephone Numbers  Bone Marrow transplant clinic:  [phone] (Monday thru Friday, 8:00 am to 4:00 pm)  After business hours call the [facility]:  [phone] and ask for the Hematology/BMT doctor on call.  Or call the Emergency Room at the [facility]  [facility]:  [phone].

## 2021-09-02 NOTE — NURSING NOTE
"Oncology Rooming Note    September 2, 2021 7:46 AM   Nik Nava is a 61 year old male who presents for:    Chief Complaint   Patient presents with     Blood Draw     Labs drawn from CVC by RN in lab. VS taken.      Port Flush     Port flushed in lab by RN with saline and heparin     Initial Vitals: /73 (BP Location: Right arm, Patient Position: Sitting, Cuff Size: Adult Regular)   Pulse 88   Temp 97.7  F (36.5  C) (Oral)   Resp 16   Wt 106.4 kg (234 lb 8 oz)   SpO2 98%   BMI 31.80 kg/m   Estimated body mass index is 31.8 kg/m  as calculated from the following:    Height as of 8/30/21: 1.829 m (6' 0.01\").    Weight as of this encounter: 106.4 kg (234 lb 8 oz). Body surface area is 2.33 meters squared.  No Pain (0) Comment: Data Unavailable   No LMP for male patient.  Allergies reviewed: Yes  Medications reviewed: Yes    Medications: Medication refills not needed today.  Pharmacy name entered into Bev[facility]:    [facility] - [place], [place] - [address] [facility] [place], [place] - [phone]    Clinical concerns: none       Suma Garza CMA              "

## 2021-09-02 NOTE — H&P (VIEW-ONLY)
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 Doing well. Rash is improving with topical steroids. Only on upper chest and upper back and quite faint. No new GI symptoms. Has chronic stomach discomfort - not new. No nausea. Normal stools. Has tried simethicone, but didn't work. Wants to wait until off MMF to try something else - offered bentyl. No bleeding. Held xarelto for bmbx and line removal.      Review of Systems                                                                                                                                         8pt Review of systems was negative other than noted above.      PHYSICAL EXAM                                                                                                                                                 KPS: 80  There were no vitals taken for this visit. Vitals reviewed  General: NAD   HEENT: sclera anicteric. OP clear  Lungs: CTAB  Heart: RRR  Abd:  + BS. Soft. No tenderness with palpation  Skin: Faint erythematous maculopapular rash on upper chest and upper back - improved per patient and wife  Neuro: A&O, speech normal  EXT:  Compression socks in place  Vascular Access: port in the right chest, left double lumen blake not tender     KPS=90  Current aGVHD staging:  Skin 1, UGI 0, LGI 0, Liver 0 (9/2/2021)    Labs:  Lab Results   Component Value Date    WBC 4.0 09/02/2021    ANEU 2.0 08/28/2021    HGB 9.3 (L) 09/02/2021    HCT 27.7 (L) 09/02/2021     09/02/2021     09/02/2021    POTASSIUM 4.6 09/02/2021    CHLORIDE 105 09/02/2021    CO2 24 08/30/2021     (H) 08/30/2021    BUN 44 (H) 08/30/2021    CR 1.19 08/30/2021    MAG 1.6 09/02/2021    INR 0.97 09/02/2021      OVERALL ASSESSMENT AND PLAN   Nik Nava is a 61 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, 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  Acute lymphoblastic leukemia, [place] chromosome positive in CR, MRD neg.  Here for flu/cy/TBI and sib allo sct  HCT-CI score: 3 (prior solid tumor)  - Off G-CSF   - day +21 BM BX under sedation. Scheduled today 9/2, follow up 9/7     2.  HEME:   - Pt/donor both Opos  - Transfuse for hgb <7g/dL; plt < 10k.  No transfusions needs  - Recent pulmonary emboli: He developed a pulmonary emboli in the setting of receiving PEG asparaginase. On xarelto will continue unless plts <50K. Held for bmbx and line removal. Ok to resume tonight     3.  FEN/renal:   - hypomag on tac. Taking PO mag 4 tabs daily. Mag ok today at 1.6, no changes  - He has a history of renal cancer and resection. has a single kidney.     4.    GVHD:   - Faint rash on back and chest. Skin bx 8/30 = suggestive of GVHD. Using topical TMC and improving  - Cont Tac/MMF   - On tac 4mg BID. Level on 8/30 = 8.2, no change to dose. Repeat on Tues 9/7  - MMF through D+30 - didn't have enough to get to D+30 = 9/9, so sent 7 more days worth of drug to pharmacy today. Last dose 9/9, stop 9/10    5.  ID: afebrile.  No active infections  - prophy acv/letermovir, fluconazole,  - sulfa allergic; pentamidine at day +28 - requested in haled pentamidine on 9/7 in PFT clinic  - CMV negative 8/25.      6. Hx of graves disease; On synthroid     7. GI:   - Dyspepsia, describes it like a nervous stomach. No diarrhea. No nausea. Cont oral mag for now. May be MMF vs gvh vs other. If no better off MMF, can try bentyl  - Prilosec for reflux     8. CV: Hypertension. Likely secondary to tacrolimus. BP in normal range, cont on norvasc 7.5mg     RTC   - Sat for labs/provider  - Tues DOM follow up on bmbx results, tac level, pentamidine in PFT clinic    I spent 40 minutes in the care of this patient today, which included time necessary for preparation for the  visit, obtaining history, ordering medications/tests/procedures as medically indicated, review of pertinent medical literature, counseling of the patient, communication of recommendations to the care team, and documentation time. Reviewed and updated medication list today    Natty Calderón PA-C  [phone]

## 2021-09-03 ENCOUNTER — HOME INFUSION (PRE-WILLOW HOME INFUSION) (OUTPATIENT)
Dept: PHARMACY | Facility: CLINIC | Age: 61
End: 2021-09-03

## 2021-09-03 LAB
PATH REPORT.COMMENTS IMP SPEC: NORMAL
PATH REPORT.FINAL DX SPEC: NORMAL
PATH REPORT.FINAL DX SPEC: NORMAL
PATH REPORT.GROSS SPEC: NORMAL
PATH REPORT.MICROSCOPIC SPEC OTHER STN: NORMAL
PATH REPORT.RELEVANT HX SPEC: NORMAL
PATH REPORT.RELEVANT HX SPEC: NORMAL

## 2021-09-04 ENCOUNTER — APPOINTMENT (OUTPATIENT)
Dept: LAB | Facility: CLINIC | Age: 61
End: 2021-09-04
Attending: INTERNAL MEDICINE
Payer: COMMERCIAL

## 2021-09-04 ENCOUNTER — ONCOLOGY VISIT (OUTPATIENT)
Dept: TRANSPLANT | Facility: CLINIC | Age: 61
End: 2021-09-04
Attending: INTERNAL MEDICINE
Payer: COMMERCIAL

## 2021-09-04 VITALS
OXYGEN SATURATION: 99 % | BODY MASS INDEX: 31.84 KG/M2 | WEIGHT: 234.8 LBS | RESPIRATION RATE: 16 BRPM | SYSTOLIC BLOOD PRESSURE: 166 MMHG | DIASTOLIC BLOOD PRESSURE: 71 MMHG | HEART RATE: 85 BPM | TEMPERATURE: 98.4 F

## 2021-09-04 DIAGNOSIS — C91.01 ACUTE LYMPHOBLASTIC LEUKEMIA (ALL) IN REMISSION (H): ICD-10-CM

## 2021-09-04 LAB
ANION GAP SERPL CALCULATED.3IONS-SCNC: 5 MMOL/L (ref 3–14)
BASOPHILS # BLD MANUAL: 0 10E3/UL (ref 0–0.2)
BASOPHILS NFR BLD MANUAL: 0 %
BUN SERPL-MCNC: 35 MG/DL (ref 7–30)
CALCIUM SERPL-MCNC: 9.4 MG/DL (ref 8.5–10.1)
CHLORIDE BLD-SCNC: 106 MMOL/L (ref 94–109)
CO2 SERPL-SCNC: 27 MMOL/L (ref 20–32)
CREAT SERPL-MCNC: 1.24 MG/DL (ref 0.66–1.25)
EOSINOPHIL # BLD MANUAL: 0 10E3/UL (ref 0–0.7)
EOSINOPHIL NFR BLD MANUAL: 1 %
ERYTHROCYTE [DISTWIDTH] IN BLOOD BY AUTOMATED COUNT: 15.3 % (ref 10–15)
GFR SERPL CREATININE-BSD FRML MDRD: 61 ML/MIN/1.73M2
GLUCOSE BLD-MCNC: 102 MG/DL (ref 70–99)
HCT VFR BLD AUTO: 26.1 % (ref 40–53)
HGB BLD-MCNC: 9 G/DL (ref 13.3–17.7)
LYMPHOCYTES # BLD MANUAL: 0.7 10E3/UL (ref 0.8–5.3)
LYMPHOCYTES NFR BLD MANUAL: 18 %
MAGNESIUM SERPL-MCNC: 1.5 MG/DL (ref 1.6–2.3)
MCH RBC QN AUTO: 33.7 PG (ref 26.5–33)
MCHC RBC AUTO-ENTMCNC: 34.5 G/DL (ref 31.5–36.5)
MCV RBC AUTO: 98 FL (ref 78–100)
MONOCYTES # BLD MANUAL: 0.4 10E3/UL (ref 0–1.3)
MONOCYTES NFR BLD MANUAL: 10 %
NEUTROPHILS # BLD MANUAL: 2.8 10E3/UL (ref 1.6–8.3)
NEUTROPHILS NFR BLD MANUAL: 71 %
PLAT MORPH BLD: ABNORMAL
PLATELET # BLD AUTO: 157 10E3/UL (ref 150–450)
POTASSIUM BLD-SCNC: 4.9 MMOL/L (ref 3.4–5.3)
RBC # BLD AUTO: 2.67 10E6/UL (ref 4.4–5.9)
RBC MORPH BLD: ABNORMAL
SODIUM SERPL-SCNC: 138 MMOL/L (ref 133–144)
WBC # BLD AUTO: 3.9 10E3/UL (ref 4–11)

## 2021-09-04 PROCEDURE — G0463 HOSPITAL OUTPT CLINIC VISIT: HCPCS

## 2021-09-04 PROCEDURE — 85027 COMPLETE CBC AUTOMATED: CPT

## 2021-09-04 PROCEDURE — 96374 THER/PROPH/DIAG INJ IV PUSH: CPT

## 2021-09-04 PROCEDURE — 36591 DRAW BLOOD OFF VENOUS DEVICE: CPT

## 2021-09-04 PROCEDURE — 99214 OFFICE O/P EST MOD 30 MIN: CPT

## 2021-09-04 PROCEDURE — 80048 BASIC METABOLIC PNL TOTAL CA: CPT

## 2021-09-04 PROCEDURE — 250N000011 HC RX IP 250 OP 636: Performed by: INTERNAL MEDICINE

## 2021-09-04 PROCEDURE — 83735 ASSAY OF MAGNESIUM: CPT

## 2021-09-04 PROCEDURE — G0463 HOSPITAL OUTPT CLINIC VISIT: HCPCS | Mod: 25

## 2021-09-04 RX ORDER — FLUCONAZOLE 200 MG/1
200 TABLET ORAL DAILY
Qty: 30 TABLET | Refills: 1 | Status: SHIPPED | OUTPATIENT
Start: 2021-09-04 | End: 2021-11-18

## 2021-09-04 RX ORDER — MAGNESIUM SULFATE HEPTAHYDRATE 40 MG/ML
2 INJECTION, SOLUTION INTRAVENOUS ONCE
Status: COMPLETED | OUTPATIENT
Start: 2021-09-04 | End: 2021-09-04

## 2021-09-04 RX ORDER — HEPARIN SODIUM (PORCINE) LOCK FLUSH IV SOLN 100 UNIT/ML 100 UNIT/ML
5 SOLUTION INTRAVENOUS ONCE
Status: COMPLETED | OUTPATIENT
Start: 2021-09-04 | End: 2021-09-04

## 2021-09-04 RX ADMIN — Medication 5 ML: at 09:32

## 2021-09-04 RX ADMIN — MAGNESIUM SULFATE HEPTAHYDRATE 2 G: 40 INJECTION, SOLUTION INTRAVENOUS at 10:21

## 2021-09-04 ASSESSMENT — PAIN SCALES - GENERAL: PAINLEVEL: MILD PAIN (3)

## 2021-09-04 NOTE — NURSING NOTE
Oncology Rooming Note    September 4, 2021 9:48 AM   Nik Nava is a 61 year old male who presents for:    Chief Complaint   Patient presents with     Port Draw     Labs drawn via port by RN. VS taken.     Oncology Clinic Visit     ALL     Initial Vitals: BP (!) 166/71   Pulse 85   Temp 98.4  F (36.9  C) (Oral)   Resp 16   Wt 106.5 kg (234 lb 12.8 oz)   SpO2 99%   BMI 31.84 kg/m   Estimated body mass index is 31.84 kg/m  as calculated from the following:    Height as of 9/2/21: 1.829 m (6').    Weight as of this encounter: 106.5 kg (234 lb 12.8 oz). Body surface area is 2.33 meters squared.  Mild Pain (3) Comment: Data Unavailable   No LMP for male patient.  Allergies reviewed: Yes  Medications reviewed: Yes    Medications: MEDICATION REFILLS NEEDED TODAY. Provider was notified. Diflucan needs refill    Pharmacy name entered into Epiv[employer]:    [facility] [place], [place] - [address] [facility] [place], [place] - [phone]    Clinical concerns: meds upset stomach, wondering if maalox can be taken to coat stomach prior to taking meds.      Yamilet Marley, CMA

## 2021-09-04 NOTE — LETTER
"    9/4/2021         RE: Nik Nava  [address] [facility] [address]        Dear Colleague,    Thank you for referring your patient, Nik Nava, to the [facility] BLOOD AND MARROW TRANSPLANT PROGRAM [place]. Please see a copy of my visit note below.    BMT Clinic Note    ID:  Nik Nava is a 61 year old male with Ph+ ALL, day +25 s/p sib allo stem cell transplant     CC: Follow-up    Interval History:   Seen for follow up. Doing well overall, only complain is GI upset after he starts taking pills it feels \"like a knot\" not really N/V, intermittent ever since switching to PO magnesium. Regular BM no constipation or diarrhea, no bleeding. Rash resolved with topical steroids, not sued in 2 days. Was only on upper chest and upper back and quite faint. No HA.lH/ziness, no bleeding, eating ans drinking well.    Review of Systems                                                                                                                                         8pt Review of systems was negative other than noted above.      PHYSICAL EXAM                                                                                                                                                 KPS: 80  BP (!) 166/71   Pulse 85   Temp 98.4  F (36.9  C) (Oral)   Resp 16   Wt 106.5 kg (234 lb 12.8 oz)   SpO2 99%   BMI 31.84 kg/m   Vitals reviewed  General: NAD   HEENT: sclera anicteric. OP clear  Lungs: CTAB  Heart: RRR  Abd:  + BS. Soft. No tenderness with palpation  Skin: Faint erythematous maculopapular rash on upper chest and upper back - improved per patient and wife  Neuro: A&O, speech normal  EXT:  Compression socks in place  Vascular Access: port in the right chest, left double lumen blake not tender     KPS=90  Current aGVHD staging:  Skin 0, UGI 0, LGI 0, Liver 0 (9/4/2021)    Labs:  Lab Results   Component Value Date    WBC 3.9 (L) 09/04/2021    ANEU 2.8 09/04/2021    HGB 9.0 (L) " 09/04/2021    HCT 26.1 (L) 09/04/2021     09/04/2021     09/04/2021    POTASSIUM 4.9 09/04/2021    CHLORIDE 106 09/04/2021    CO2 27 09/04/2021     (H) 09/04/2021    BUN 35 (H) 09/04/2021    CR 1.24 09/04/2021    MAG 1.5 (L) 09/04/2021    INR 0.97 09/02/2021      OVERALL ASSESSMENT AND PLAN   Nik Nava is a 61 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, 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  Here for flu/cy/TBI and sib allo sct  HCT-CI score: 3 (prior solid tumor)  - Off G-CSF   - day +21 BM BX under sedation. Scheduled today 9/2, follow up 9/7     2.  HEME:   - Pt/donor both Opos  - Transfuse for hgb <7g/dL; plt < 10k.  No transfusions needs  - Recent pulmonary emboli: He developed a pulmonary emboli in the setting of receiving PEG asparaginase. On xarelto     3.  FEN/renal:   - hypomag on tac. Taking PO mag 4 tabs daily. Mag ok today at 1.6, no changes  - He has a history of renal cancer and resection. has a single kidney.     4.    GVHD:   - Faint rash on back and chest. Skin bx 8/30 = suggestive of GVHD. Using topical TMC and improving  - Cont Tac/MMF   - On tac 4mg BID. Level on 8/30 = 8.2, no change to dose. Repeat on Tues 9/7  - MMF through D+30  Last dose 9/9, stop 9/10    5.  ID: afebrile.  No active infections  - prophy acv/letrmovir, fluconazole,  - sulfa allergic; pentamidine at day +28- inhaled pentamidine on 9/7 in PFT clinic  - CMV negative 8/25.      6. Hx of graves disease; On synthroid     7. GI:   - Dyspepsia, instructed to take PPI 30 min before pills or breakfast was taking after am coffee, cont oral mag for now. May be MMF vs gvh vs other.      8. CV: Hypertension. Likely secondary to tacrolimus. BP in normal range, cont on norvasc 7.5mg     RTC   - Tues DOM follow up on bmbx results, tac  level, pentamidine in PFT clinic    I spent 30 minutes in the care of this patient today, which included time necessary for preparation for the visit, obtaining history, ordering medications/tests/procedures as medically indicated, review of pertinent medical literature, counseling of the patient, communication of recommendations to the care team, and documentation time.                   Again, thank you for allowing me to participate in the care of your patient.        Sincerely,        BMT DOM

## 2021-09-04 NOTE — NURSING NOTE
"Chief Complaint   Patient presents with     Port Draw     Labs drawn via port by RN. VS taken.     Oncology Clinic Visit     ALL     Port accessed with 20g 3/4\" gripper needle and labs drawn by rn.  Port flushed with NS and heparin.  Pt tolerated well.  VS taken.  Pt checked in for next appt.    Cornelio White RN  "

## 2021-09-04 NOTE — NURSING NOTE
Infusion Nursing Note:  Nik Nava presents today for magnesium replacement.    Patient seen by provider today: Yes: Dr. Alvarado   present during visit today: Not Applicable.    Note: Labs monitored, VSS. 2g IV mag administered.     Intravenous Access:  Mcknight.    Treatment Conditions:  Results reviewed, labs MET treatment parameters, ok to proceed with treatment.      Post Infusion Assessment:  Patient tolerated infusion without incident.       Discharge Plan:   Patient discharged in stable condition accompanied by: self.  Departure Mode: Ambulatory.      Nico Tovar RN

## 2021-09-04 NOTE — PROGRESS NOTES
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 Doing well overall, only complain is GI upset after he starts taking pills it feels \"like a knot\" not really N/V, intermittent ever since switching to PO magnesium. Regular BM no constipation or diarrhea, no bleeding. Rash resolved with topical steroids, not sued in 2 days. Was only on upper chest and upper back and quite faint. No HA.lH/ziness, no bleeding, eating ans drinking well.    Review of Systems                                                                                                                                         8pt Review of systems was negative other than noted above.      PHYSICAL EXAM                                                                                                                                                 KPS: 80  BP (!) 166/71   Pulse 85   Temp 98.4  F (36.9  C) (Oral)   Resp 16   Wt 106.5 kg (234 lb 12.8 oz)   SpO2 99%   BMI 31.84 kg/m   Vitals reviewed  General: NAD   HEENT: sclera anicteric. OP clear  Lungs: CTAB  Heart: RRR  Abd:  + BS. Soft. No tenderness with palpation  Skin: Faint erythematous maculopapular rash on upper chest and upper back - improved per patient and wife  Neuro: A&O, speech normal  EXT:  Compression socks in place  Vascular Access: port in the right chest, left double lumen blake not tender     KPS=90  Current aGVHD staging:  Skin 0, UGI 0, LGI 0, Liver 0 (9/4/2021)    Labs:  Lab Results   Component Value Date    WBC 3.9 (L) 09/04/2021    ANEU 2.8 09/04/2021    HGB 9.0 (L) 09/04/2021    HCT 26.1 (L) 09/04/2021     09/04/2021     09/04/2021    POTASSIUM 4.9 09/04/2021    CHLORIDE 106 09/04/2021    CO2 27 09/04/2021     (H) 09/04/2021    BUN 35 (H) 09/04/2021    CR 1.24 09/04/2021    MAG 1.5 (L) 09/04/2021    INR 0.97 09/02/2021 "      OVERALL ASSESSMENT AND PLAN   Nik Nava is a 61 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, 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  Acute lymphoblastic leukemia, [place] chromosome positive in CR, MRD neg.  Here for flu/cy/TBI and sib allo sct  HCT-CI score: 3 (prior solid tumor)  - Off G-CSF   - day +21 BM BX under sedation. Scheduled today 9/2, follow up 9/7     2.  HEME:   - Pt/donor both Opos  - Transfuse for hgb <7g/dL; plt < 10k.  No transfusions needs  - Recent pulmonary emboli: He developed a pulmonary emboli in the setting of receiving PEG asparaginase. On xarelto     3.  FEN/renal:   - hypomag on tac. Taking PO mag 4 tabs daily. Mag ok today at 1.6, no changes  - He has a history of renal cancer and resection. has a single kidney.     4.    GVHD:   - Faint rash on back and chest. Skin bx 8/30 = suggestive of GVHD. Using topical TMC and improving  - Cont Tac/MMF   - On tac 4mg BID. Level on 8/30 = 8.2, no change to dose. Repeat on Tues 9/7  - MMF through D+30  Last dose 9/9, stop 9/10    5.  ID: afebrile.  No active infections  - prophy acv/letrmovir, fluconazole,  - sulfa allergic; pentamidine at day +28- inhaled pentamidine on 9/7 in PFT clinic  - CMV negative 8/25.      6. Hx of graves disease; On synthroid     7. GI:   - Dyspepsia, instructed to take PPI 30 min before pills or breakfast was taking after am coffee, cont oral mag for now. May be MMF vs gvh vs other.      8. CV: Hypertension. Likely secondary to tacrolimus. BP in normal range, cont on norvasc 7.5mg     RTC   - Tues DOM follow up on bmbx results, tac level, pentamidine in PFT clinic    I spent 30 minutes in the care of this patient today, which included time necessary for preparation for the visit, obtaining history, ordering medications/tests/procedures as medically indicated, review of pertinent medical literature,  counseling of the patient, communication of recommendations to the care team, and documentation time.

## 2021-09-05 LAB — CMV DNA SPEC NAA+PROBE-ACNC: NOT DETECTED IU/ML

## 2021-09-07 ENCOUNTER — APPOINTMENT (OUTPATIENT)
Dept: LAB | Facility: CLINIC | Age: 61
End: 2021-09-07
Attending: INTERNAL MEDICINE
Payer: COMMERCIAL

## 2021-09-07 ENCOUNTER — OFFICE VISIT (OUTPATIENT)
Dept: TRANSPLANT | Facility: CLINIC | Age: 61
End: 2021-09-07
Attending: INTERNAL MEDICINE
Payer: COMMERCIAL

## 2021-09-07 VITALS
HEIGHT: 72 IN | RESPIRATION RATE: 16 BRPM | WEIGHT: 232.2 LBS | DIASTOLIC BLOOD PRESSURE: 79 MMHG | BODY MASS INDEX: 31.45 KG/M2 | HEART RATE: 93 BPM | OXYGEN SATURATION: 97 % | SYSTOLIC BLOOD PRESSURE: 127 MMHG | TEMPERATURE: 98.3 F

## 2021-09-07 DIAGNOSIS — Z94.81 STATUS POST BONE MARROW TRANSPLANT (H): Primary | ICD-10-CM

## 2021-09-07 DIAGNOSIS — C91.01 ACUTE LYMPHOBLASTIC LEUKEMIA (ALL) IN REMISSION (H): ICD-10-CM

## 2021-09-07 LAB
ALBUMIN SERPL-MCNC: 3.8 G/DL (ref 3.4–5)
ALP SERPL-CCNC: 66 U/L (ref 40–150)
ALT SERPL W P-5'-P-CCNC: 21 U/L (ref 0–70)
ANION GAP SERPL CALCULATED.3IONS-SCNC: 7 MMOL/L (ref 3–14)
AST SERPL W P-5'-P-CCNC: 16 U/L (ref 0–45)
BASOPHILS # BLD MANUAL: 0 10E3/UL (ref 0–0.2)
BASOPHILS NFR BLD MANUAL: 0 %
BILIRUB SERPL-MCNC: 0.5 MG/DL (ref 0.2–1.3)
BUN SERPL-MCNC: 36 MG/DL (ref 7–30)
CALCIUM SERPL-MCNC: 9.4 MG/DL (ref 8.5–10.1)
CHLORIDE BLD-SCNC: 104 MMOL/L (ref 94–109)
CO2 SERPL-SCNC: 24 MMOL/L (ref 20–32)
CREAT SERPL-MCNC: 1.48 MG/DL (ref 0.66–1.25)
EOSINOPHIL # BLD MANUAL: 0.2 10E3/UL (ref 0–0.7)
EOSINOPHIL NFR BLD MANUAL: 6 %
ERYTHROCYTE [DISTWIDTH] IN BLOOD BY AUTOMATED COUNT: 15.3 % (ref 10–15)
GFR SERPL CREATININE-BSD FRML MDRD: 50 ML/MIN/1.73M2
GLUCOSE BLD-MCNC: 110 MG/DL (ref 70–99)
HCT VFR BLD AUTO: 27 % (ref 40–53)
HGB BLD-MCNC: 9.2 G/DL (ref 13.3–17.7)
LYMPHOCYTES # BLD MANUAL: 0.8 10E3/UL (ref 0.8–5.3)
LYMPHOCYTES NFR BLD MANUAL: 21 %
MAGNESIUM SERPL-MCNC: 1.6 MG/DL (ref 1.6–2.3)
MCH RBC QN AUTO: 33.3 PG (ref 26.5–33)
MCHC RBC AUTO-ENTMCNC: 34.1 G/DL (ref 31.5–36.5)
MCV RBC AUTO: 98 FL (ref 78–100)
MONOCYTES # BLD MANUAL: 0.9 10E3/UL (ref 0–1.3)
MONOCYTES NFR BLD MANUAL: 24 %
NEUTROPHILS # BLD MANUAL: 1.9 10E3/UL (ref 1.6–8.3)
NEUTROPHILS NFR BLD MANUAL: 49 %
PLAT MORPH BLD: NORMAL
PLATELET # BLD AUTO: 145 10E3/UL (ref 150–450)
POTASSIUM BLD-SCNC: 4.3 MMOL/L (ref 3.4–5.3)
PROT SERPL-MCNC: 7.3 G/DL (ref 6.8–8.8)
RBC # BLD AUTO: 2.76 10E6/UL (ref 4.4–5.9)
RBC MORPH BLD: NORMAL
SODIUM SERPL-SCNC: 135 MMOL/L (ref 133–144)
TACROLIMUS BLD-MCNC: 6.4 UG/L (ref 5–15)
TME LAST DOSE: NORMAL H
TME LAST DOSE: NORMAL H
WBC # BLD AUTO: 3.9 10E3/UL (ref 4–11)

## 2021-09-07 PROCEDURE — 99215 OFFICE O/P EST HI 40 MIN: CPT

## 2021-09-07 PROCEDURE — 80053 COMPREHEN METABOLIC PANEL: CPT

## 2021-09-07 PROCEDURE — G0463 HOSPITAL OUTPT CLINIC VISIT: HCPCS

## 2021-09-07 PROCEDURE — 94640 AIRWAY INHALATION TREATMENT: CPT

## 2021-09-07 PROCEDURE — 85027 COMPLETE CBC AUTOMATED: CPT

## 2021-09-07 PROCEDURE — 94640 AIRWAY INHALATION TREATMENT: CPT | Performed by: INTERNAL MEDICINE

## 2021-09-07 PROCEDURE — 94642 AEROSOL INHALATION TREATMENT: CPT

## 2021-09-07 PROCEDURE — 36591 DRAW BLOOD OFF VENOUS DEVICE: CPT

## 2021-09-07 PROCEDURE — 94642 AEROSOL INHALATION TREATMENT: CPT | Performed by: INTERNAL MEDICINE

## 2021-09-07 PROCEDURE — 83735 ASSAY OF MAGNESIUM: CPT

## 2021-09-07 PROCEDURE — 80197 ASSAY OF TACROLIMUS: CPT

## 2021-09-07 RX ORDER — ALBUTEROL SULFATE 5 MG/ML
2.5 SOLUTION RESPIRATORY (INHALATION) EVERY 6 HOURS PRN
Status: DISCONTINUED | OUTPATIENT
Start: 2021-09-07 | End: 2021-09-07 | Stop reason: HOSPADM

## 2021-09-07 RX ORDER — HEPARIN SODIUM,PORCINE 10 UNIT/ML
5 VIAL (ML) INTRAVENOUS
Status: CANCELLED | OUTPATIENT
Start: 2021-10-07

## 2021-09-07 RX ORDER — PENTAMIDINE ISETHIONATE 300 MG/300MG
300 INHALANT RESPIRATORY (INHALATION)
Status: DISCONTINUED | OUTPATIENT
Start: 2021-09-07 | End: 2021-09-07 | Stop reason: HOSPADM

## 2021-09-07 RX ORDER — HEPARIN SODIUM (PORCINE) LOCK FLUSH IV SOLN 100 UNIT/ML 100 UNIT/ML
5 SOLUTION INTRAVENOUS
Status: CANCELLED | OUTPATIENT
Start: 2021-10-07

## 2021-09-07 RX ORDER — ALBUTEROL SULFATE 5 MG/ML
2.5 SOLUTION RESPIRATORY (INHALATION) EVERY 6 HOURS PRN
Status: CANCELLED
Start: 2021-10-07

## 2021-09-07 RX ORDER — HEPARIN SODIUM (PORCINE) LOCK FLUSH IV SOLN 100 UNIT/ML 100 UNIT/ML
5 SOLUTION INTRAVENOUS ONCE
Status: DISCONTINUED | OUTPATIENT
Start: 2021-09-07 | End: 2021-09-07 | Stop reason: HOSPADM

## 2021-09-07 RX ORDER — PENTAMIDINE ISETHIONATE 300 MG/300MG
300 INHALANT RESPIRATORY (INHALATION)
Status: CANCELLED
Start: 2021-10-07

## 2021-09-07 RX ADMIN — PENTAMIDINE ISETHIONATE 300 MG: 300 INHALANT RESPIRATORY (INHALATION) at 14:32

## 2021-09-07 ASSESSMENT — MIFFLIN-ST. JEOR: SCORE: 1886.25

## 2021-09-07 ASSESSMENT — PAIN SCALES - GENERAL: PAINLEVEL: MILD PAIN (3)

## 2021-09-07 NOTE — LETTER
9/7/2021         RE: Nik Nava  [address] [facility] [address]        Dear Colleague,    Thank you for referring your patient, Nik Nava, to the [facility] BLOOD AND MARROW TRANSPLANT PROGRAM [place]. Please see a copy of my visit note below.    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 s/p sib allo stem cell transplant     CC: Follow-up, day 28 review    Oncology Treatment History:            Treatment/Chemotherapy Number of Cycles Date Range Outcomes & Complications   Dexamethasone/desatinib One month Jan 2021- Feb 2021 Bone marrow 3% blasts   Vincristine/pred/dauno/pegasparaginase & IT methotrexate   Two cycles    HD-AraC One cycle June 2021 MRD neg CR   pred 10mg/day + dasatinib 140mg/day     8/10/2021 MSD PBSCT VERÓNICA Flu/CY/TBI   - 8/1/2021            Interval History:   Seen for follow up, was constipated on Saturday with some right lower abd pain that resolved, still has intermittent gassy pain that does not radiate and better when he is moving. No N/V. Rash resolved with topical steroids, not using. Was only on upper chest and upper back and quite faint. No HA/LH/ziness, no bleeding, eating and drinking well.    Review of Systems                                                                                                                                         10 point Review of systems was negative other than noted above.      PHYSICAL EXAM                                                                                                                                                 KPS: 80  /79   Pulse 93   Temp 98.3  F (36.8  C) (Oral)   Resp 16   Ht 1.829 m (6')   Wt 105.3 kg (232 lb 3.2 oz)   SpO2 97%   BMI 31.49 kg/m      General: NAD   HEENT: sclera anicteric. OP clear  Lungs: CTAB  Heart: RRR  Abd:  + BS. Soft. No tenderness with palpation  Skin: Faint erythematous maculopapular rash on upper chest and upper  back - improved per patient and wife  Neuro: A&O, speech normal  EXT:  Compression socks in place  Vascular Access: port in the right chest, left double lumen blake not tender     KPS=90  Current aGVHD staging:  Skin 0, UGI 0, LGI 0, Liver 0 (9/7/2021)    Labs:  Lab Results   Component Value Date    WBC 3.9 (L) 09/04/2021    ANEU 2.8 09/04/2021    HGB 9.0 (L) 09/04/2021    HCT 26.1 (L) 09/04/2021     09/04/2021     09/04/2021    POTASSIUM 4.9 09/04/2021    CHLORIDE 106 09/04/2021    CO2 27 09/04/2021     (H) 09/04/2021    BUN 35 (H) 09/04/2021    CR 1.24 09/04/2021    MAG 1.5 (L) 09/04/2021    INR 0.97 09/02/2021      OVERALL ASSESSMENT AND PLAN   Nik Nava is a 61 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, 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  Acute lymphoblastic leukemia, [place] chromosome positive in CR, MRD neg.  Here for flu/cy/TBI and sib allo sct  HCT-CI score: 3 (prior solid tumor)  - Off G-CSF   - 9/2/2021 day +21 BMB: Slightly hypocellular marrow (cellularity estimated as 20 to 30%) with trilineage hematopoiesis, less than 1% blasts. No morphologic or immunophenotypic evidence for B lymphoblastic leukemia/lymphoma. BM DNA Chimerism 90% donor, FISH/CG pending. PB chimerism ordered for day +30.     2.  HEME:   - Pt/donor both Opos  - Transfuse for hgb <7g/dL; plt < 10k.  No transfusions needs  - Recent pulmonary emboli: He developed a pulmonary emboli in the setting of receiving PEG asparaginase. On xarelto     3.  FEN/renal:   - hypomag on tac. Taking PO mag 4 tabs daily. Mag today=1.6, no changes  - He has a history of renal cancer and resection. has a single kidney.  -Creatinine up to 1.48 today.  Elevated BUN.  Likely prerenal tacrolimus level pending.  Patient believes he can increase his fluid intake to 3 L a day rather than getting IV fluids today.     4.    GVHD:    - Faint rash on back and chest. Skin bx 8/30 = interface dermatitis suggestive of GVHD. Using topical TMC and improving  - Cont Tac/MMF   - On tac 4mg BID. Level on 8/30 = 8.2, no change to dose. Repeat on Tues 9/7 pending  - MMF through D+30  Last dose 9/9, stop 9/10    5.  ID: afebrile.  No active infections  - prophy acv/letrmovir, fluconazole,  - sulfa allergic; pentamidine at day +28- inhaled pentamidine 9/7   - CMV negative 9/4.      6. Hx of graves disease; On synthroid     7. GI:   - Dyspepsia, instructed to take PPI 30 min before pills or breakfast was taking after am coffee, cont oral mag for now. May be MMF vs gvh vs other.      8. CV: Hypertension. Likely secondary to tacrolimus. BP in normal range, cont on norvasc 7.5mg     RTC in 2 days for follow-up, will increase fluid intake to 3 L a day, follow-up on creatinine and assess need for fluids patient has 1 kidney.    I spent 40 minutes in the care of this patient today, which included time necessary for preparation for the visit, obtaining history, ordering medications/tests/procedures as medically indicated, review of pertinent medical literature, counseling of the patient, communication of recommendations to the care team, and documentation time.                   Again, thank you for allowing me to participate in the care of your patient.        Sincerely,        BMT DOM

## 2021-09-07 NOTE — NURSING NOTE
Oncology Rooming Note    September 7, 2021 4:07 PM   Nik Nava is a 61 year old male who presents for:    Chief Complaint   Patient presents with     Port Draw     Labs drawn via port by RN. VS taken.     RECHECK     Retun: Acute lymphoblastic leukemia (ALL)     Initial Vitals: /79   Pulse 93   Temp 98.3  F (36.8  C) (Oral)   Resp 16   Ht 1.829 m (6')   Wt 105.3 kg (232 lb 3.2 oz)   SpO2 97%   BMI 31.49 kg/m   Estimated body mass index is 31.49 kg/m  as calculated from the following:    Height as of this encounter: 1.829 m (6').    Weight as of this encounter: 105.3 kg (232 lb 3.2 oz). Body surface area is 2.31 meters squared.  Mild Pain (3) Comment: Data Unavailable   No LMP for male patient.  Allergies reviewed: Yes  Medications reviewed: Yes    Medications: Medication refills not needed today.  Pharmacy name entered into [employer]:    [facility] - [place], [place] - [address] [facility] [place], [place] -  [facility] [phone]    Clinical concerns: N/A     Yanira Solis CMA

## 2021-09-07 NOTE — PROGRESS NOTES
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 s/p sib allo stem cell transplant     CC: Follow-up, day 28 review    Oncology Treatment History:            Treatment/Chemotherapy Number of Cycles Date Range Outcomes & Complications   Dexamethasone/desatinib One month Jan 2021- Feb 2021 Bone marrow 3% blasts   Vincristine/pred/dauno/pegasparaginase & IT methotrexate   Two cycles    HD-AraC One cycle June 2021 MRD neg CR   pred 10mg/day + dasatinib 140mg/day     8/10/2021 MSD PBSCT VER[place]NICA Flu/CY/TBI   - 8/1/2021            Interval History:   Seen for follow up, was constipated on Saturday with some right lower abd pain that resolved, still has intermittent gassy pain that does not radiate and better when he is moving. No N/V. Rash resolved with topical steroids, not using. Was only on upper chest and upper back and quite faint. No HA/LH/ziness, no bleeding, eating and drinking well.    Review of Systems                                                                                                                                         10 point Review of systems was negative other than noted above.      PHYSICAL EXAM                                                                                                                                                 KPS: 80  /79   Pulse 93   Temp 98.3  F (36.8  C) (Oral)   Resp 16   Ht 1.829 m (6')   Wt 105.3 kg (232 lb 3.2 oz)   SpO2 97%   BMI 31.49 kg/m      General: NAD   HEENT: sclera anicteric. OP clear  Lungs: CTAB  Heart: RRR  Abd:  + BS. Soft. No tenderness with palpation  Skin: Faint erythematous maculopapular rash on upper chest and upper back - improved per patient and wife  Neuro: A&O, speech normal  EXT:  Compression socks in place  Vascular Access: port in the right chest, left double lumen blake not tender     KPS=90  Current aGVHD staging:  Skin 0, UGI 0, LGI 0, Liver 0 (9/7/2021)    Labs:  Lab Results    Component Value Date    WBC 3.9 (L) 09/04/2021    ANEU 2.8 09/04/2021    HGB 9.0 (L) 09/04/2021    HCT 26.1 (L) 09/04/2021     09/04/2021     09/04/2021    POTASSIUM 4.9 09/04/2021    CHLORIDE 106 09/04/2021    CO2 27 09/04/2021     (H) 09/04/2021    BUN 35 (H) 09/04/2021    CR 1.24 09/04/2021    MAG 1.5 (L) 09/04/2021    INR 0.97 09/02/2021      OVERALL ASSESSMENT AND PLAN   Nik Nava is a 61 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, 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  Here for flu/cy/TBI and sib allo sct  HCT-CI score: 3 (prior solid tumor)  - Off G-CSF   - 9/2/2021 day +21 BMB: Slightly hypocellular marrow (cellularity estimated as 20 to 30%) with trilineage hematopoiesis, less than 1% blasts. No morphologic or immunophenotypic evidence for B lymphoblastic leukemia/lymphoma. BM DNA Chimerism 90% donor, FISH/CG pending. PB chimerism ordered for day +30.     2.  HEME:   - Pt/donor both Opos  - Transfuse for hgb <7g/dL; plt < 10k.  No transfusions needs  - Recent pulmonary emboli: He developed a pulmonary emboli in the setting of receiving PEG asparaginase. On xarelto     3.  FEN/renal:   - hypomag on tac. Taking PO mag 4 tabs daily. Mag today=1.6, no changes  - He has a history of renal cancer and resection. has a single kidney.  -Creatinine up to 1.48 today.  Elevated BUN.  Likely prerenal tacrolimus level pending.  Patient believes he can increase his fluid intake to 3 L a day rather than getting IV fluids today.     4.    GVHD:   - Faint rash on back and chest. Skin bx 8/30 = interface dermatitis suggestive of GVHD. Using topical TMC and improving  - Cont Tac/MMF   - On tac 4mg BID. Level on 8/30 = 8.2, no change to dose. Repeat on Tues 9/7 pending  - MMF through D+30  Last dose 9/9, stop 9/10    5.   ID: afebrile.  No active infections  - prophy acv/letrmovir, fluconazole,  - sulfa allergic; pentamidine at day +28- inhaled pentamidine 9/7   - CMV negative 9/4.      6. Hx of graves disease; On synthroid     7. GI:   - Dyspepsia, instructed to take PPI 30 min before pills or breakfast was taking after am coffee, cont oral mag for now. May be MMF vs gvh vs other.      8. CV: Hypertension. Likely secondary to tacrolimus. BP in normal range, cont on norvasc 7.5mg     RTC in 2 days for follow-up, will increase fluid intake to 3 L a day, follow-up on creatinine and assess need for fluids patient has 1 kidney.    I spent 40 minutes in the care of this patient today, which included time necessary for preparation for the visit, obtaining history, ordering medications/tests/procedures as medically indicated, review of pertinent medical literature, counseling of the patient, communication of recommendations to the care team, and documentation time.

## 2021-09-07 NOTE — PROGRESS NOTES
Patient was seen today for a Pentamidine nebulizer tx ordered by Natty Calderón.    Patient was first given 2.5 mg albuterol nebulizer (NDC# 3063-7654-40), after which Pentamidine 300 mg (Lot # [id number]) mixed with 6cc Sterile H2O was administered through a filtered nebulizer.    Pre-treatment: SpO2 = 99%     HR = 100     BBS = clear  Post-treatment: SpO2 = 98%     HR = 103     BBS = clear    Some coughing noted by the patient 10 minutes into treatment, patient did finish treatment.    Procedure was completed today by Willie Blackwell.

## 2021-09-07 NOTE — NURSING NOTE
"Chief Complaint   Patient presents with     Port Draw     Labs drawn via port by RN. VS taken.     Port accessed with 20g 3/4\" gripper needle and labs drawn by rn.  Port flushed with NS and heparin.  Pt tolerated well.  VS taken. De-accessed. Pt checked in for next appt.    Cornelio White RN  "

## 2021-09-08 LAB — CMV DNA SPEC NAA+PROBE-ACNC: NOT DETECTED IU/ML

## 2021-09-08 NOTE — PROGRESS NOTES
This is a recent snapshot of the patient's [facility] medical record.  For current drug dose and complete information and questions, call [phone] or In Basket pool, [facility] home infusion ([id number])  CSN Number:  [id number]

## 2021-09-09 ENCOUNTER — ONCOLOGY VISIT (OUTPATIENT)
Dept: TRANSPLANT | Facility: CLINIC | Age: 61
End: 2021-09-09
Attending: INTERNAL MEDICINE
Payer: COMMERCIAL

## 2021-09-09 ENCOUNTER — APPOINTMENT (OUTPATIENT)
Dept: LAB | Facility: CLINIC | Age: 61
End: 2021-09-09
Attending: INTERNAL MEDICINE
Payer: COMMERCIAL

## 2021-09-09 VITALS
OXYGEN SATURATION: 98 % | WEIGHT: 229 LBS | SYSTOLIC BLOOD PRESSURE: 114 MMHG | BODY MASS INDEX: 31.06 KG/M2 | DIASTOLIC BLOOD PRESSURE: 75 MMHG | RESPIRATION RATE: 16 BRPM | TEMPERATURE: 97.5 F | HEART RATE: 81 BPM

## 2021-09-09 DIAGNOSIS — C91.01 ACUTE LYMPHOBLASTIC LEUKEMIA (ALL) IN REMISSION (H): ICD-10-CM

## 2021-09-09 PROCEDURE — G0463 HOSPITAL OUTPT CLINIC VISIT: HCPCS

## 2021-09-09 PROCEDURE — 36591 DRAW BLOOD OFF VENOUS DEVICE: CPT

## 2021-09-09 PROCEDURE — 99214 OFFICE O/P EST MOD 30 MIN: CPT

## 2021-09-09 PROCEDURE — 250N000011 HC RX IP 250 OP 636: Performed by: INTERNAL MEDICINE

## 2021-09-09 RX ORDER — HEPARIN SODIUM (PORCINE) LOCK FLUSH IV SOLN 100 UNIT/ML 100 UNIT/ML
5 SOLUTION INTRAVENOUS EVERY 8 HOURS PRN
Status: DISCONTINUED | OUTPATIENT
Start: 2021-09-09 | End: 2021-09-09 | Stop reason: HOSPADM

## 2021-09-09 RX ADMIN — Medication 5 ML: at 10:04

## 2021-09-09 ASSESSMENT — PAIN SCALES - GENERAL: PAINLEVEL: NO PAIN (0)

## 2021-09-09 NOTE — LETTER
9/9/2021         RE: Nik Nava  [address] [facility] [address]        Dear Colleague,    Thank you for referring your patient, Nik Nava, to the [facility] BLOOD AND MARROW TRANSPLANT PROGRAM [place]. Please see a copy of my visit note below.    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 s/p sib allo stem cell transplant     CC: Routine f/u     Oncology Treatment History:            Treatment/Chemotherapy Number of Cycles Date Range Outcomes & Complications   Dexamethasone/desatinib One month Jan 2021- Feb 2021 Bone marrow 3% blasts   Vincristine/pred/dauno/pegasparaginase & IT methotrexate   Two cycles    HD-AraC One cycle June 2021 MRD neg CR   pred 10mg/day + dasatinib 140mg/day     8/10/2021 MSD PBSCT VER[place]NICA Flu/CY/TBI   - 8/1/2021            Interval History:   Seen for follow up, was constipated on Saturday with some right lower abd pain that resolved, still has intermittent gassy pain that does not radiate and better when he is moving. No N/V. Rash resolved with topical steroids, not using. Was only on upper chest and upper back and quite faint. No HA/LH/ziness, no bleeding, eating and drinking well.    Review of Systems                                                                                                                                         10 point Review of systems was negative other than noted above.      PHYSICAL EXAM                                                                                                                                                 KPS: 80  /75   Pulse 81   Temp 97.5  F (36.4  C) (Oral)   Resp 16   Wt 103.9 kg (229 lb)   SpO2 98%   BMI 31.06 kg/m      General: NAD   HEENT: sclera anicteric. OP clear  Lungs: CTAB  Heart: RRR  Abd:  + BS. Soft. No tenderness with palpation  Skin: Faint erythematous maculopapular rash on upper chest and upper back - improved per patient and  wife  Neuro: A&O, speech normal  EXT:  Compression socks in place  Vascular Access: port in the right chest, left double lumen blake not tender     KPS=90  Current aGVHD staging:  Skin 0, UGI 0, LGI 0, Liver 0 (9/7/2021)    Labs:  Lab Results   Component Value Date    WBC 4.0 09/09/2021    ANEU 1.9 09/07/2021    HGB 9.5 (L) 09/09/2021    HCT 27.5 (L) 09/09/2021     09/09/2021     (L) 09/09/2021    POTASSIUM 4.8 09/09/2021    CHLORIDE 102 09/09/2021    CO2 23 09/09/2021    GLC 98 09/09/2021    BUN 28 09/09/2021    CR 1.50 (H) 09/09/2021    MAG 1.5 (L) 09/09/2021    INR 0.97 09/02/2021      OVERALL ASSESSMENT AND PLAN   Nik Nava is a 61 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, 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  Acute lymphoblastic leukemia, [place] chromosome positive in CR, MRD neg.  Here for flu/cy/TBI and sib allo sct  HCT-CI score: 3 (prior solid tumor)  - Off G-CSF   - 9/2/2021 day +21 BMB: Slightly hypocellular marrow (cellularity estimated as 20 to 30%) with trilineage hematopoiesis, less than 1% blasts. No morphologic or immunophenotypic evidence for B lymphoblastic leukemia/lymphoma. BM DNA Chimerism 90% donor, FISH/CG pending. PB chimerism ordered for day +30.     2.  HEME:   - Pt/donor both Opos  - Transfuse for hgb <7g/dL; plt < 10k.  No transfusions needs  - Recent pulmonary emboli: He developed a pulmonary emboli in the setting of receiving PEG asparaginase. On xarelto     3.  FEN/renal:   - hypomag on tac.  Mag today=1.5, no changes  - He has a history of renal cancer and resection. has a single kidney.  -Creatinine up to 1.5 - drinking really well. Last tac level appropriate-- see tac dose reduction below and f/u Cr Monday. Pt drinking 2-3L water daily.      4.    GVHD:   - Faint rash on back and chest. Skin bx 8/30 = interface dermatitis suggestive of GVHD. Using  topical TMC and improving  - Cont Tac/MMF   - On tac 4mg BID-- 9/9/21 Cr still elevated 1.5- discussed wit pharm D. Decrease tac from 4mg pO BID to 3.5mg PO BID and f/u level 9/13.   . Level on  9/7: 6.2 -- but 19hr trough to level higher.   - MMF through D+30  Last dose 9/9, stop 9/10    5.  ID: afebrile.  No active infections  - prophy acv/letrmovir, fluconazole,  - sulfa allergic; pentamidine at day +28- inhaled pentamidine 9/7   - CMV negative 9/4.      6. Hx of graves disease; On synthroid     7. GI:   - Dyspepsia, instructed to take PPI 30 min before pills or breakfast was taking after am coffee, cont oral mag for now. May be MMF vs gvh vs other.      8. CV: Hypertension. Likely secondary to tacrolimus. BP in normal range, cont on norvasc 7.5mg     RTC Tuesday for labs and provider visit.   REduct tac to 3.5mg PO BID - f/u Cr Tuesday next week. If any issues pt to call over weekend.     I spent 40 minutes in the care of this patient today, which included time necessary for preparation for the visit, obtaining history, ordering medications/tests/procedures as medically indicated, review of pertinent medical literature, counseling of the patient, communication of recommendations to the care team, and documentation time.       Nuvia Wisdom PA-C  [phone]      Again, thank you for allowing me to participate in the care of your patient.        Sincerely,        BMT Advanced Practice Provider

## 2021-09-09 NOTE — NURSING NOTE
Chief Complaint   Patient presents with     Port Draw     Labs drawn via PORT by RN in lab. VS taken.      Winnie Corrales RN

## 2021-09-09 NOTE — NURSING NOTE
Oncology Rooming Note    September 9, 2021 10:19 AM   Nik Nava is a 61 year old male who presents for:    Chief Complaint   Patient presents with     Port Draw     Labs drawn via PORT by RN in lab. VS taken.      Oncology Clinic Visit     Acute lymphoblastic leukemia      Initial Vitals: /75   Pulse 81   Temp 97.5  F (36.4  C) (Oral)   Resp 16   Wt 103.9 kg (229 lb)   SpO2 98%   BMI 31.06 kg/m   Estimated body mass index is 31.06 kg/m  as calculated from the following:    Height as of 9/7/21: 1.829 m (6').    Weight as of this encounter: 103.9 kg (229 lb). Body surface area is 2.3 meters squared.  No Pain (0) Comment: Data Unavailable   No LMP for male patient.  Allergies reviewed: Yes  Medications reviewed: Yes    Medications: MEDICATION REFILLS NEEDED TODAY. Provider was notified.  Pharmacy name entered into [employer]:    [facility] - [place], [place] - [address] [facility] [place], [place] - [phone]    Clinical concerns: Medication refill: letermovir(Prevymis)   Concerned about his tacrolimus levels, wondering if they can get that redrawn. Also wondering about next week and how nothing is scheduled yet.       Carmen Gottlieb LPN

## 2021-09-09 NOTE — PROGRESS NOTES
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 s/p sib allo stem cell transplant     CC: Routine f/u     Oncology Treatment History:            Treatment/Chemotherapy Number of Cycles Date Range Outcomes & Complications   Dexamethasone/desatinib One month Jan 2021- Feb 2021 Bone marrow 3% blasts   Vincristine/pred/dauno/pegasparaginase & IT methotrexate   Two cycles    HD-AraC One cycle June 2021 MRD neg CR   pred 10mg/day + dasatinib 140mg/day     8/10/2021 MSD PBSCT VER[place]ICA Flu/CY/TBI   - 8/1/2021            Interval History:   Seen for follow up, was constipated on Saturday with some right lower abd pain that resolved, still has intermittent gassy pain that does not radiate and better when he is moving. No N/V. Rash resolved with topical steroids, not using. Was only on upper chest and upper back and quite faint. No HA/LH/ziness, no bleeding, eating and drinking well.    Review of Systems                                                                                                                                         10 point Review of systems was negative other than noted above.      PHYSICAL EXAM                                                                                                                                                 KPS: 80  /75   Pulse 81   Temp 97.5  F (36.4  C) (Oral)   Resp 16   Wt 103.9 kg (229 lb)   SpO2 98%   BMI 31.06 kg/m      General: NAD   HEENT: sclera anicteric. OP clear  Lungs: CTAB  Heart: RRR  Abd:  + BS. Soft. No tenderness with palpation  Skin: Faint erythematous maculopapular rash on upper chest and upper back - improved per patient and wife  Neuro: A&O, speech normal  EXT:  Compression socks in place  Vascular Access: port in the right chest, left double lumen blake not tender     KPS=90  Current aGVHD staging:  Skin 0, UGI 0, LGI 0, Liver 0 (9/7/2021)    Labs:  Lab Results   Component Value Date    WBC 4.0 09/09/2021     ANEU 1.9 09/07/2021    HGB 9.5 (L) 09/09/2021    HCT 27.5 (L) 09/09/2021     09/09/2021     (L) 09/09/2021    POTASSIUM 4.8 09/09/2021    CHLORIDE 102 09/09/2021    CO2 23 09/09/2021    GLC 98 09/09/2021    BUN 28 09/09/2021    CR 1.50 (H) 09/09/2021    MAG 1.5 (L) 09/09/2021    INR 0.97 09/02/2021      OVERALL ASSESSMENT AND PLAN   Nik Nava is a 61 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, 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  Acute lymphoblastic leukemia, [place] chromosome positive in CR, MRD neg.  Here for flu/cy/TBI and sib allo sct  HCT-CI score: 3 (prior solid tumor)  - Off G-CSF   - 9/2/2021 day +21 BMB: Slightly hypocellular marrow (cellularity estimated as 20 to 30%) with trilineage hematopoiesis, less than 1% blasts. No morphologic or immunophenotypic evidence for B lymphoblastic leukemia/lymphoma. BM DNA Chimerism 90% donor, FISH/CG pending. PB chimerism ordered for day +30.     2.  HEME:   - Pt/donor both Opos  - Transfuse for hgb <7g/dL; plt < 10k.  No transfusions needs  - Recent pulmonary emboli: He developed a pulmonary emboli in the setting of receiving PEG asparaginase. On xarelto     3.  FEN/renal:   - hypomag on tac.  Mag today=1.5, no changes  - He has a history of renal cancer and resection. has a single kidney.  -Creatinine up to 1.5 - drinking really well. Last tac level appropriate-- see tac dose reduction below and f/u Cr Monday. Pt drinking 2-3L water daily.      4.    GVHD:   - Faint rash on back and chest. Skin bx 8/30 = interface dermatitis suggestive of GVHD. Using topical TMC and improving  - Cont Tac/MMF   - On tac 4mg BID-- 9/9/21 Cr still elevated 1.5- discussed wit pharm D. Decrease tac from 4mg pO BID to 3.5mg PO BID and f/u level 9/13.   . Level on  9/7: 6.2 -- but 19hr trough to level higher.   - MMF through D+30  Last dose 9/9, stop 9/10    5.   ID: afebrile.  No active infections  - prophy acv/letrmovir, fluconazole,  - sulfa allergic; pentamidine at day +28- inhaled pentamidine 9/7   - CMV negative 9/4.      6. Hx of graves disease; On synthroid     7. GI:   - Dyspepsia, instructed to take PPI 30 min before pills or breakfast was taking after am coffee, cont oral mag for now. May be MMF vs gvh vs other.      8. CV: Hypertension. Likely secondary to tacrolimus. BP in normal range, cont on norvasc 7.5mg     RTC Tuesday for labs and provider visit.   REduct tac to 3.5mg PO BID - f/u Cr Tuesday next week. If any issues pt to call over weekend.     I spent 40 minutes in the care of this patient today, which included time necessary for preparation for the visit, obtaining history, ordering medications/tests/procedures as medically indicated, review of pertinent medical literature, counseling of the patient, communication of recommendations to the care team, and documentation time.       CATRACHITA MominC  [phone]

## 2021-09-10 ENCOUNTER — TELEPHONE (OUTPATIENT)
Dept: TRANSPLANT | Facility: CLINIC | Age: 61
End: 2021-09-10

## 2021-09-10 NOTE — TELEPHONE ENCOUNTER
Pts wife called today as pt had 2 bouts of diarrhea today.      Had some crampy abd prior to BM.    He had a normal BM yesterday and took miralax twice.  Normal BM this AM and again took miralax.    I advised him to hold further doses of miralax particularly in the face of normal BM.  If he is having 'gas' he could take simethicone.    He will be seen early next week as planned.    Lashanda rico-sara  [phone]

## 2021-09-10 NOTE — PROGRESS NOTES
This is a recent snapshot of the patient's [facility] medical record.  For current drug dose and complete information and questions, call [phone] or In Basket pool, [facility] home infusion ([phone])  CSN Number:  [id number]

## 2021-09-12 ENCOUNTER — HOSPITAL ENCOUNTER (EMERGENCY)
Facility: CLINIC | Age: 61
Discharge: HOME | End: 2021-09-12
Attending: INTERNAL MEDICINE | Admitting: INTERNAL MEDICINE
Payer: COMMERCIAL

## 2021-09-12 ENCOUNTER — APPOINTMENT (OUTPATIENT)
Dept: CT IMAGING | Facility: CLINIC | Age: 61
End: 2021-09-12
Attending: INTERNAL MEDICINE
Payer: COMMERCIAL

## 2021-09-12 VITALS
RESPIRATION RATE: 18 BRPM | TEMPERATURE: 98 F | OXYGEN SATURATION: 100 % | HEART RATE: 85 BPM | SYSTOLIC BLOOD PRESSURE: 126 MMHG | WEIGHT: 227 LBS | BODY MASS INDEX: 30.79 KG/M2 | DIASTOLIC BLOOD PRESSURE: 78 MMHG

## 2021-09-12 DIAGNOSIS — R22.0 FACIAL SWELLING: ICD-10-CM

## 2021-09-12 DIAGNOSIS — Z94.81 STATUS POST BONE MARROW TRANSPLANT (H): ICD-10-CM

## 2021-09-12 DIAGNOSIS — C91.01 ACUTE LYMPHOBLASTIC LEUKEMIA (ALL) IN REMISSION (H): ICD-10-CM

## 2021-09-12 LAB
ALBUMIN SERPL-MCNC: 3.7 G/DL (ref 3.4–5)
ALBUMIN UR-MCNC: NEGATIVE MG/DL
ALP SERPL-CCNC: 64 U/L (ref 40–150)
ALT SERPL W P-5'-P-CCNC: 19 U/L (ref 0–70)
ANION GAP SERPL CALCULATED.3IONS-SCNC: 9 MMOL/L (ref 3–14)
APPEARANCE UR: CLEAR
AST SERPL W P-5'-P-CCNC: 14 U/L (ref 0–45)
BASOPHILS # BLD AUTO: 0 10E3/UL (ref 0–0.2)
BASOPHILS NFR BLD AUTO: 1 %
BILIRUB SERPL-MCNC: 0.4 MG/DL (ref 0.2–1.3)
BILIRUB UR QL STRIP: NEGATIVE
BUN SERPL-MCNC: 29 MG/DL (ref 7–30)
CALCIUM SERPL-MCNC: 9.7 MG/DL (ref 8.5–10.1)
CHLORIDE BLD-SCNC: 100 MMOL/L (ref 94–109)
CMV DNA SPEC NAA+PROBE-ACNC: NOT DETECTED IU/ML
CO2 SERPL-SCNC: 22 MMOL/L (ref 20–32)
COLOR UR AUTO: ABNORMAL
CREAT SERPL-MCNC: 1.49 MG/DL (ref 0.66–1.25)
CRP SERPL-MCNC: 4.5 MG/L (ref 0–8)
EOSINOPHIL # BLD AUTO: 0.2 10E3/UL (ref 0–0.7)
EOSINOPHIL NFR BLD AUTO: 5 %
ERYTHROCYTE [DISTWIDTH] IN BLOOD BY AUTOMATED COUNT: 15.4 % (ref 10–15)
ERYTHROCYTE [SEDIMENTATION RATE] IN BLOOD BY WESTERGREN METHOD: 56 MM/HR (ref 0–20)
GFR SERPL CREATININE-BSD FRML MDRD: 49 ML/MIN/1.73M2
GLUCOSE BLD-MCNC: 105 MG/DL (ref 70–99)
GLUCOSE UR STRIP-MCNC: NEGATIVE MG/DL
HCT VFR BLD AUTO: 28.9 % (ref 40–53)
HGB BLD-MCNC: 10.1 G/DL (ref 13.3–17.7)
HGB UR QL STRIP: NEGATIVE
HOLD SPECIMEN: NORMAL
HYALINE CASTS: 8 /LPF
IMM GRANULOCYTES # BLD: 0.1 10E3/UL
IMM GRANULOCYTES NFR BLD: 1 %
KETONES UR STRIP-MCNC: NEGATIVE MG/DL
LACTATE SERPL-SCNC: 0.6 MMOL/L (ref 0.7–2)
LEUKOCYTE ESTERASE UR QL STRIP: NEGATIVE
LYMPHOCYTES # BLD AUTO: 0.7 10E3/UL (ref 0.8–5.3)
LYMPHOCYTES NFR BLD AUTO: 17 %
MCH RBC QN AUTO: 33.3 PG (ref 26.5–33)
MCHC RBC AUTO-ENTMCNC: 34.9 G/DL (ref 31.5–36.5)
MCV RBC AUTO: 95 FL (ref 78–100)
MONOCYTES # BLD AUTO: 0.9 10E3/UL (ref 0–1.3)
MONOCYTES NFR BLD AUTO: 23 %
MUCOUS THREADS #/AREA URNS LPF: PRESENT /LPF
NEUTROPHILS # BLD AUTO: 2.1 10E3/UL (ref 1.6–8.3)
NEUTROPHILS NFR BLD AUTO: 53 %
NITRATE UR QL: NEGATIVE
NRBC # BLD AUTO: 0 10E3/UL
NRBC BLD AUTO-RTO: 0 /100
PH UR STRIP: 5.5 [PH] (ref 5–7)
PLAT MORPH BLD: NORMAL
PLATELET # BLD AUTO: 154 10E3/UL (ref 150–450)
POTASSIUM BLD-SCNC: 4.5 MMOL/L (ref 3.4–5.3)
PROT SERPL-MCNC: 7.3 G/DL (ref 6.8–8.8)
RBC # BLD AUTO: 3.03 10E6/UL (ref 4.4–5.9)
RBC MORPH BLD: NORMAL
RBC URINE: <1 /HPF
SODIUM SERPL-SCNC: 131 MMOL/L (ref 133–144)
SP GR UR STRIP: 1.01 (ref 1–1.03)
TRANSITIONAL EPI: <1 /HPF
UROBILINOGEN UR STRIP-MCNC: NORMAL MG/DL
WBC # BLD AUTO: 4 10E3/UL (ref 4–11)
WBC URINE: 1 /HPF

## 2021-09-12 PROCEDURE — 86140 C-REACTIVE PROTEIN: CPT | Performed by: INTERNAL MEDICINE

## 2021-09-12 PROCEDURE — 83605 ASSAY OF LACTIC ACID: CPT | Performed by: INTERNAL MEDICINE

## 2021-09-12 PROCEDURE — 99284 EMERGENCY DEPT VISIT MOD MDM: CPT | Mod: GC | Performed by: INTERNAL MEDICINE

## 2021-09-12 PROCEDURE — 99284 EMERGENCY DEPT VISIT MOD MDM: CPT | Mod: 25 | Performed by: INTERNAL MEDICINE

## 2021-09-12 PROCEDURE — 36415 COLL VENOUS BLD VENIPUNCTURE: CPT | Performed by: INTERNAL MEDICINE

## 2021-09-12 PROCEDURE — 85652 RBC SED RATE AUTOMATED: CPT | Performed by: INTERNAL MEDICINE

## 2021-09-12 PROCEDURE — 87040 BLOOD CULTURE FOR BACTERIA: CPT | Performed by: INTERNAL MEDICINE

## 2021-09-12 PROCEDURE — 81003 URINALYSIS AUTO W/O SCOPE: CPT | Performed by: INTERNAL MEDICINE

## 2021-09-12 PROCEDURE — 85004 AUTOMATED DIFF WBC COUNT: CPT | Performed by: INTERNAL MEDICINE

## 2021-09-12 PROCEDURE — 250N000011 HC RX IP 250 OP 636: Performed by: INTERNAL MEDICINE

## 2021-09-12 PROCEDURE — 70486 CT MAXILLOFACIAL W/O DYE: CPT | Mod: 26 | Performed by: RADIOLOGY

## 2021-09-12 PROCEDURE — 80053 COMPREHEN METABOLIC PANEL: CPT | Performed by: INTERNAL MEDICINE

## 2021-09-12 PROCEDURE — 70486 CT MAXILLOFACIAL W/O DYE: CPT

## 2021-09-12 RX ORDER — LEVOFLOXACIN 500 MG/1
500 TABLET, FILM COATED ORAL DAILY
Qty: 7 TABLET | Refills: 0 | Status: SHIPPED | OUTPATIENT
Start: 2021-09-12 | End: 2021-09-19

## 2021-09-12 RX ORDER — HEPARIN SODIUM (PORCINE) LOCK FLUSH IV SOLN 100 UNIT/ML 100 UNIT/ML
5 SOLUTION INTRAVENOUS
Status: DISCONTINUED | OUTPATIENT
Start: 2021-09-12 | End: 2021-09-12 | Stop reason: HOSPADM

## 2021-09-12 RX ADMIN — HEPARIN 5 ML: 100 SYRINGE at 13:59

## 2021-09-12 NOTE — DISCHARGE INSTRUCTIONS
Please start levaquin and make an appointment to follow up with Hematology Oncology Clinic (phone: [phone]) as soon as possible even if entirely better.

## 2021-09-12 NOTE — ED TRIAGE NOTES
Patient arrives today with concern of left sided facial swelling that started last night today per report little more swollen Patient is 30 days out from bone marrow transplant.

## 2021-09-12 NOTE — ED PROVIDER NOTES
ED Provider Note  [facility]      History     Chief Complaint   Patient presents with     Facial Swelling     HPI  Nik Nava is a 61 year old male with PMH of BMT for ALL (30 days ago), hypothyroidism, CKD III, and bilateral PEs who presents with 1 day of left-sided facial swelling. His wife reports that last night, the left side of his nose looked swollen and this morning the whole left side of his face appeared swollen. They have not tried any medications or non-pharmacologic measures for this. No facial pain, numbness, weakness, droop, eye pain, vision changes, hearing loss, ear pain, or headache associated with this. They called their oncologist, who recommended to come in for evaluation. He reports that he is eating and drinking normally. No lip or tongue swelling, difficulty swallowing, cough, chest pain, shortness of breath, nausea, or current diarrhea. His wife did note diarrhea a few days ago but this has resolved. Also no neck pain, or myalgias. His wife also reported that he had a rash on his back and a similar one in front of his left ear. However, this resolved with triamcinolone cream. She does note that he had R sided tooth issues before his transplant. However, this had resolved. He was noted to have a gap in between two teeth in which food could collect and the area could become inflamed. However, no tooth pain today. They also note that they have not noticed any swollen lymph nodes. They deny new recent foods, new products, or any new bedding such as pillows.     Of note, the patient was noted to have a bump in Cr and therefore his tacro was decreased from 4 mg to 3.5 mg. His levels will be rechecked on Tuesday. He was recommended to drink 2L of water each day. They report some bilateral lower leg swelling but this is improved with compression stockings.He does endorse longstanding pelvic discomfort and pressure that he has discussed with his heme/onc doctor. As a  result of the increased fluids he was recommended to drink, he also endorses increased frequency.  In addition, he has completed his 30 days of his MMF and is no longer taking this. The patient is taking Xarelto for bilateral pulmonary embolisms in June. No new medications.    Past Medical History  Past Medical History:   Diagnosis Date     Cancer (H)     renal cell     CKD (chronic kidney disease)      Thyroid disease      Past Surgical History:   Procedure Laterality Date     BACK SURGERY  2017     BONE MARROW BIOPSY, BONE SPECIMEN, NEEDLE/TROCAR Left 9/2/2021    Procedure: BIOPSY, BONE MARROW;  Surgeon: Jailyn yN, PEE CNP;  Location: [facility] OR     IR CVC TUNNEL PLACEMENT > 5 YRS OF AGE  8/4/2021     IR CVC TUNNEL REMOVAL LEFT  9/2/2021     levofloxacin (LEVAQUIN) 500 MG tablet  acetaminophen (TYLENOL) 500 MG tablet  acyclovir (ZOVIRAX) 800 MG tablet  amLODIPine (NORVASC) 5 MG tablet  fluconazole (DIFLUCAN) 200 MG tablet  letermovir (PREVYMIS) 480 MG TABS tablet  levothyroxine (SYNTHROID/LEVOTHROID) 200 MCG tablet  LORazepam (ATIVAN) 1 MG tablet  magnesium oxide (MAG-OX) 400 MG tablet  melatonin 5 MG CAPS  mycophenolate (GENERIC EQUIVALENT) 500 MG tablet  nicotine polacrilex (NICORETTE) 4 MG gum  Nutritional Supplements (ENSURE HIGH PROTEIN)  omeprazole (PRILOSEC) 40 MG DR capsule  ondansetron (ZOFRAN) 8 MG tablet  oxyCODONE (ROXICODONE) 5 MG tablet  polyethylene glycol (MIRALAX) 17 GM/Dose powder  prochlorperazine (COMPAZINE) 10 MG tablet  senna (SENOKOT) 8.6 MG tablet  tacrolimus (GENERIC EQUIVALENT) 0.5 MG capsule  tacrolimus (GENERIC EQUIVALENT) 1 MG capsule  triamcinolone (KENALOG) 0.1 % external cream  ursodiol (ACTIGALL) 300 MG capsule  XARELTO ANTICOAGULANT 20 MG TABS tablet    Note: has stopped MMF.    Allergies   Allergen Reactions     Sulfamethoxazole W/Trimethoprim Rash     Family History  Family History   Problem Relation Age of Onset     Lung Cancer Mother      Polycythemia Father       Coronary Artery Disease Maternal Grandmother      Social History   Social History     Tobacco Use     Smoking status: Former Smoker     Packs/day: 2.00     Years: 30.00     Pack years: 60.00     Quit date: 2019     Years since quittin.3     Smokeless tobacco: Never Used   Substance Use Topics     Alcohol use: Not Currently     Drug use: Never      Past medical history, past surgical history, medications, allergies, family history, and social history were reviewed with the patient. No additional pertinent items.       Review of Systems  10 point ROS neg other than the symptoms noted above in the HPI.    Physical Exam   BP: (!) 147/86  Pulse: 83  Temp: 97.8  F (36.6  C)  Resp: 16  Weight: 103 kg (227 lb)  SpO2: 100 %     Physical Exam  Vitals and nursing note reviewed.   Constitutional:       General: He is not in acute distress.     Appearance: He is not ill-appearing.   HENT:      Head:      Comments: Mild L periorbital and maxillary edema      Right Ear: External ear normal.      Left Ear: External ear normal.      Mouth/Throat:      Mouth: Mucous membranes are moist.      Pharynx: Oropharynx is clear. No posterior oropharyngeal erythema.   Eyes:      General: No scleral icterus.        Right eye: No discharge.         Left eye: No discharge.      Extraocular Movements: Extraocular movements intact.      Conjunctiva/sclera: Conjunctivae normal.      Pupils: Pupils are equal, round, and reactive to light.   Cardiovascular:      Rate and Rhythm: Normal rate and regular rhythm.      Heart sounds: Normal heart sounds.   Pulmonary:      Effort: Pulmonary effort is normal.      Breath sounds: Normal breath sounds.   Musculoskeletal:      Cervical back: Neck supple.   Lymphadenopathy:      Cervical: No cervical adenopathy.   Skin:     General: Skin is warm and dry.      Comments: No rash present over the L preauricular or back as previously reported   Neurological:      General: No focal deficit present.       Mental Status: He is alert.      Cranial Nerves: No cranial nerve deficit.      Sensory: No sensory deficit.         ED Course     ED Course as of Sep 12 1622   Sun Sep 12, 2021   1207 CBC with Hgb 10.1, unchanged from labs on 9/9   CBC with platelets differential(!)   1210 CMP with mild hyponatremia to 131 (132 on 9/9) and elevated Cr to 1.49 (1.50 on 9/9)   Comprehensive metabolic panel(!)   1227 Patient reported pelvic pressure, and UA was sent      1227 Heme/onc was paged      1344 Discussed the patient with heme/onc, who recommended starting levofloxacin and having close follow up at appointment on Tuesday.      1344 On re-evaluation, the patient's facial swelling is unchanged. He does endorse pelvic pressure that has been long-standing.        Procedures          Results for orders placed or performed during the hospital encounter of 09/12/21   CT Facial Bones without Contrast     Status: None    Narrative    EXAM: CT FACIAL BONES WITHOUT CONTRAST  9/12/2021 11:48 AM      History:  Left facial swelling s/p BMT, concern for cellulitis    Comparison:  MRI 1/14/2021      Technique: Using thin collimation multidetector helical acquisition  technique, axial and coronal thin section CT images were reconstructed  through the facial bones. Images were reviewed in bone and soft tissue  windows.    Findings: Minimal appreciable soft tissue swelling over the left  subcutaneous face. No underlying abnormal collection or mass. No other  fracture of the facial bones. Cribriform plate appears intact. The  alignment of the facial bones is normal.     The orbits are unremarkable. The paraspinous sinuses are clear. The  first right mandibular molar is absent. No significant periapical  dental disease.      Impression    Impression:   Minimal soft tissue swelling over the left face. Otherwise  unremarkable CT of the facial bones.    I have personally reviewed the examination and initial interpretation  and I agree with the  findings.    SIRISHA MAURICE MD         SYSTEM ID:  [id number]   Extra Blood Culture Bottle     Status: None   Result Value Ref Range    Hold Specimen JIC    Extra Blue Top Tube     Status: None   Result Value Ref Range    Hold Specimen JIC    Extra Red Top Tube     Status: None   Result Value Ref Range    Hold Specimen JIC    Extra Green Top (Lithium Heparin) Tube     Status: None   Result Value Ref Range    Hold Specimen JIC    Extra Green Top (Lithium Heparin) Tube     Status: None   Result Value Ref Range    Hold Specimen JIC    Extra Purple Top Tube     Status: None   Result Value Ref Range    Hold Specimen JIC    CBC with platelets differential     Status: Abnormal    Narrative    The following orders were created for panel order CBC with platelets differential.  Procedure                               Abnormality         Status                     ---------                               -----------         ------                     CBC with platelets and d...[[id number]]  Abnormal            Final result               RBC and Platelet Morphology[[id number]]                      Final result                 Please view results for these tests on the individual orders.   Comprehensive metabolic panel     Status: Abnormal   Result Value Ref Range    Sodium 131 (L) 133 - 144 mmol/L    Potassium 4.5 3.4 - 5.3 mmol/L    Chloride 100 94 - 109 mmol/L    Carbon Dioxide (CO2) 22 20 - 32 mmol/L    Anion Gap 9 3 - 14 mmol/L    Urea Nitrogen 29 7 - 30 mg/dL    Creatinine 1.49 (H) 0.66 - 1.25 mg/dL    Calcium 9.7 8.5 - 10.1 mg/dL    Glucose 105 (H) 70 - 99 mg/dL    Alkaline Phosphatase 64 40 - 150 U/L    AST 14 0 - 45 U/L    ALT 19 0 - 70 U/L    Protein Total 7.3 6.8 - 8.8 g/dL    Albumin 3.7 3.4 - 5.0 g/dL    Bilirubin Total 0.4 0.2 - 1.3 mg/dL    GFR Estimate 49 (L) >60 mL/min/1.73m2   Lactic acid whole blood     Status: Abnormal   Result Value Ref Range    Lactic Acid 0.6 (L) 0.7 - 2.0 mmol/L   Erythrocyte sedimentation  rate auto     Status: Abnormal   Result Value Ref Range    Erythrocyte Sedimentation Rate 56 (H) 0 - 20 mm/hr   CBC with platelets and differential     Status: Abnormal   Result Value Ref Range    WBC Count 4.0 4.0 - 11.0 10e3/uL    RBC Count 3.03 (L) 4.40 - 5.90 10e6/uL    Hemoglobin 10.1 (L) 13.3 - 17.7 g/dL    Hematocrit 28.9 (L) 40.0 - 53.0 %    MCV 95 78 - 100 fL    MCH 33.3 (H) 26.5 - 33.0 pg    MCHC 34.9 31.5 - 36.5 g/dL    RDW 15.4 (H) 10.0 - 15.0 %    Platelet Count 154 150 - 450 10e3/uL    % Neutrophils 53 %    % Lymphocytes 17 %    % Monocytes 23 %    % Eosinophils 5 %    % Basophils 1 %    % Immature Granulocytes 1 %    NRBCs per 100 WBC 0 <1 /100    Absolute Neutrophils 2.1 1.6 - 8.3 10e3/uL    Absolute Lymphocytes 0.7 (L) 0.8 - 5.3 10e3/uL    Absolute Monocytes 0.9 0.0 - 1.3 10e3/uL    Absolute Eosinophils 0.2 0.0 - 0.7 10e3/uL    Absolute Basophils 0.0 0.0 - 0.2 10e3/uL    Absolute Immature Granulocytes 0.1 (H) <=0.0 10e3/uL    Absolute NRBCs 0.0 10e3/uL   CRP inflammation     Status: Normal   Result Value Ref Range    CRP Inflammation 4.5 0.0 - 8.0 mg/L   RBC and Platelet Morphology     Status: None   Result Value Ref Range    Platelet Assessment  Automated Count Confirmed. Platelet morphology is normal.     Automated Count Confirmed. Platelet morphology is normal.    RBC Morphology Confirmed RBC Indices    Extra Urine Collection     Status: None   Result Value Ref Range    Hold Specimen JI    UA with Microscopic reflex to Culture     Status: Abnormal    Specimen: Urine, Catheter   Result Value Ref Range    Color Urine Light Yellow Colorless, Straw, Light Yellow, Yellow    Appearance Urine Clear Clear    Glucose Urine Negative Negative mg/dL    Bilirubin Urine Negative Negative    Ketones Urine Negative Negative mg/dL    Specific Gravity Urine 1.013 1.003 - 1.035    Blood Urine Negative Negative    pH Urine 5.5 5.0 - 7.0    Protein Albumin Urine Negative Negative mg/dL    Urobilinogen Urine Normal  Normal, 2.0 mg/dL    Nitrite Urine Negative Negative    Leukocyte Esterase Urine Negative Negative    Mucus Urine Present (A) None Seen /LPF    RBC Urine <1 <=2 /HPF    WBC Urine 1 <=5 /HPF    Transitional Epithelials Urine <1 <=1 /HPF    Hyaline Casts Urine 8 (H) <=2 /LPF    Narrative    Urine Culture not indicated  Urine Culture not indicated   [facility] Draw     Status: None    Narrative    The following orders were created for panel order [facility] Draw.  Procedure                               Abnormality         Status                     ---------                               -----------         ------                     Extra Blood Culture Bottle[[id number]]                       Final result               Extra Blue Top Tube[[id number]]                              Final result               Extra Red Top Tube[[id number]]                               Final result               Extra Green Top (Lithium...[[id number]]                      Final result               Extra Green Top (Lithium...[[id number]]                      Final result               Extra Purple Top Tube[[id number]]                            Final result                 Please view results for these tests on the individual orders.   [facility] Draw     Status: None    Narrative    The following orders were created for panel order [facility] Draw.  Procedure                               Abnormality         Status                     ---------                               -----------         ------                     Extra Urine Collection[[id number]]                           Final result                 Please view results for these tests on the individual orders.     Medications - No data to display     Assessments & Plan (with Medical Decision Making)   Nik Nava is a 61 year old male with PMH of BMT for ALL (30 days ago), hypothyroidism, CKD III, and bilateral PEs who presents with 1 day of left-sided facial swelling. Physical exam showed mild  L sided facial swelling, more prominent around the eye and L maxilla. There is no pain, erythema, rash, or evidence of facial droop. Sensation and motor function are intact. Laboratory evaluation including CBC and CMP demonstrated mild anemia, hyponatremia, and elevated Cr, unchanged from labs on 9/9. CRP and lactate are WNL. ESR was elevated to 56. Given patient's recent BMT and immunosuppression, CT of the face without contrast was performed, demonstrating minimal swelling of the soft tissues of the L face. The patient was discussed with heme/onc. He is current on viral and anti-fungal prophylaxis. However, he is not currently on antibiotics. Therefore, he will be started on levofloxacin 500 mg once daily for 7 days. He will be discharged in stable condtion to home and will follow up closely with heme/onc at his previously scheduled appointment on Tuesday.    UA for his longstanding pelvic discomfort and new urinary frequency is pending here on discharge. He will be called with results.    I have reviewed the nursing notes. I have reviewed the findings, diagnosis, plan and need for follow up with the patient.    Discharge Medication List as of 9/12/2021  1:41 PM      START taking these medications    Details   levofloxacin (LEVAQUIN) 500 MG tablet Take 1 tablet (500 mg) by mouth daily for 7 days, Disp-7 tablet, R-0, Local Print             Final diagnoses:   Facial swelling   Status post bone marrow transplant (H)   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)       --  Gisela Ghosh, MS4  [employer]    --    ED Attending Physician Attestation    I Juan Carlos Lee MD, MD, cared for this patient with the Medical Student. I performed, or re-performed, the physical exam and medical decision-making. I have verified the accuracy of all the medical student findings and documentation above, and have edited as necessary.    Summary of HPI, PE, ED Course   Patient is a 61 year old male  evaluated in the emergency department for left facial swelling. Exam notable for VSS mild left facial swelling over maxilla and periorbiral area. ED course notable for labs ok with mild elevation of inflammatory markers, CT mild swelling of left face but no abscess, D/W Heme Onc-ok to discharge with levauin since he is high risk for infection with recent allo BMT for ALL, already on antifungal, antiviral but no Abx. After the completion of care in the emergency department, the patient was discharged with close follow up with BMT Tuesday.    Critical Care & Procedures  Not applicable.    Medical Decision Making  The medical record was reviewed and interpreted.      Juan Carlos Lee MD, MD  Emergency Medicine      Juan Carlos Lee Md  [facility] EMERGENCY DEPARTMENT  9/12/2021     Juan Carlos Lee MD  09/12/21 [id number]

## 2021-09-14 ENCOUNTER — APPOINTMENT (OUTPATIENT)
Dept: LAB | Facility: CLINIC | Age: 61
End: 2021-09-14
Attending: PHYSICIAN ASSISTANT
Payer: COMMERCIAL

## 2021-09-14 ENCOUNTER — ONCOLOGY VISIT (OUTPATIENT)
Dept: TRANSPLANT | Facility: CLINIC | Age: 61
End: 2021-09-14
Attending: PHYSICIAN ASSISTANT
Payer: COMMERCIAL

## 2021-09-14 VITALS
SYSTOLIC BLOOD PRESSURE: 136 MMHG | HEART RATE: 93 BPM | BODY MASS INDEX: 31.23 KG/M2 | WEIGHT: 230.3 LBS | DIASTOLIC BLOOD PRESSURE: 81 MMHG | TEMPERATURE: 97.9 F | RESPIRATION RATE: 16 BRPM | OXYGEN SATURATION: 98 %

## 2021-09-14 DIAGNOSIS — C91.01 ACUTE LYMPHOBLASTIC LEUKEMIA (ALL) IN REMISSION (H): ICD-10-CM

## 2021-09-14 PROCEDURE — 250N000011 HC RX IP 250 OP 636: Performed by: PHYSICIAN ASSISTANT

## 2021-09-14 PROCEDURE — G0463 HOSPITAL OUTPT CLINIC VISIT: HCPCS

## 2021-09-14 PROCEDURE — 99214 OFFICE O/P EST MOD 30 MIN: CPT

## 2021-09-14 RX ORDER — MAGNESIUM OXIDE 400 MG/1
TABLET ORAL
Qty: 120 TABLET | Refills: 4 | COMMUNITY
Start: 2021-09-14 | End: 2021-10-08

## 2021-09-14 RX ORDER — AMLODIPINE BESYLATE 5 MG/1
7.5 TABLET ORAL DAILY
Qty: 45 TABLET | Refills: 11 | Status: SHIPPED | OUTPATIENT
Start: 2021-09-14 | End: 2021-10-12

## 2021-09-14 RX ORDER — HEPARIN SODIUM (PORCINE) LOCK FLUSH IV SOLN 100 UNIT/ML 100 UNIT/ML
5 SOLUTION INTRAVENOUS EVERY 8 HOURS
Status: DISCONTINUED | OUTPATIENT
Start: 2021-09-14 | End: 2021-09-14 | Stop reason: HOSPADM

## 2021-09-14 RX ORDER — TACROLIMUS 0.5 MG/1
CAPSULE ORAL
COMMUNITY
Start: 2021-09-14 | End: 2021-10-19

## 2021-09-14 RX ORDER — TACROLIMUS 1 MG/1
3 CAPSULE ORAL 2 TIMES DAILY
Qty: 240 CAPSULE | Refills: 0 | COMMUNITY
Start: 2021-09-14 | End: 2021-10-12

## 2021-09-14 RX ORDER — ACYCLOVIR 800 MG/1
800 TABLET ORAL 2 TIMES DAILY
Qty: 60 TABLET | Refills: 3 | Status: SHIPPED | OUTPATIENT
Start: 2021-09-14 | End: 2021-12-14

## 2021-09-14 RX ADMIN — Medication 5 ML: at 10:31

## 2021-09-14 ASSESSMENT — PAIN SCALES - GENERAL: PAINLEVEL: NO PAIN (0)

## 2021-09-14 NOTE — PROGRESS NOTES
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  This is on hold since Friday and his stools are now normal.  No n/v.  No further rash.  No fever.  No bleeding.  Feeling good.  No new complaints today.      Review of Systems                                                                                                                                         10 point Review of systems was negative other than noted above.      PHYSICAL EXAM                                                                                                                                                 KPS: 80  /81   Pulse 93   Temp 97.9  F (36.6  C) (Oral)   Resp 16   Wt 104.5 kg (230 lb 4.8 oz)   SpO2 98%   BMI 31.23 kg/m      General: NAD   HEENT: sclera anicteric. OP clear  Lungs: CTAB  Heart: RRR  Abd:  + BS. Soft. No tenderness with palpation  Skin: no rash currently  Neuro: A&O, speech normal  EXT:  trace LE edema  Vascular Access: port in the right chest, left double lumen blake not tender     KPS=90  Current aGVHD staging:  Skin 0, UGI 0, LGI 0, Liver 0 (9/7/2021)    Labs:  Lab Results   Component Value Date    WBC 6.6 09/14/2021    ANEU 1.9 09/07/2021    HGB 9.2 (L) 09/14/2021    HCT 26.8 (L) 09/14/2021     09/14/2021     (L) 09/12/2021    POTASSIUM 4.5 09/12/2021    CHLORIDE 100 09/12/2021    CO2  22 09/12/2021     (H) 09/12/2021    BUN 29 09/12/2021    CR 1.49 (H) 09/12/2021    MAG 1.5 (L) 09/09/2021    INR 0.97 09/02/2021      OVERALL ASSESSMENT AND PLAN   Nik Nava is a 61 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, 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  Acute lymphoblastic leukemia, [place] chromosome positive in CR, MRD neg.  Here for flu/cy/TBI and sib allo sct  HCT-CI score: 3 (prior solid tumor)  - Off G-CSF   - 9/2/2021 day +21 BMB: Slightly hypocellular marrow (cellularity estimated as 20 to 30%) with trilineage hematopoiesis, less than 1% blasts. No morphologic or immunophenotypic evidence for B lymphoblastic leukemia/lymphoma. BM DNA Chimerism 90% donor, FISH/CG pending.   - VNTR 94% CD33 lineage, 84% CD3 lineage (day 30 PB)     2.  HEME:   - Pt/donor both Opos  - Transfuse for hgb <7g/dL; plt < 10k.  No transfusions needs  - Recent pulmonary emboli: He developed a pulmonary emboli in the setting of receiving PEG asparaginase. On xarelto     3.  FEN/renal:   - hypomag on tac.  Mag today=1.5, no changes  - He has a history of renal cancer and resection. has a single kidney.  - Creatinine up to 1.8 - drinking 2 L today.  Empiric dose reduction of tac--see below     4.    GVHD:   - Faint rash on back and chest. Skin bx 8/30 = interface dermatitis suggestive of GVHD. Using topical TMC and improving  - On tac 3.5mg PO BID, Level on  9/7: 6.2 -- but 19hr trough to level higher.  Level pending from today. Will decrease to 3mg BID today while awaiting level due to rise in Cr.  - MMF through D+30  stopped 9/10    5.  ID: afebrile.  No active infections  - prophy acv/letrmovir, fluconazole,  - sulfa allergic; pentamidine at day +28- inhaled pentamidine 9/7   - CMV negative 9/4.      6. Hx of graves disease; On synthroid     7. GI:   - Dyspepsia, instructed to take PPI 30 min before pills  or breakfast was taking after am coffee, cont oral mag for now. May be MMF vs gvh vs other.      8. CV: Hypertension. Likely secondary to tacrolimus. BP in normal range, cont on norvasc 7.5mg     RTC Friday for labs and provider visit.   Reduced tac to 3mg PO BID as level is pending and Cr is up. Will make further changes based on today's level  Increase Mg to 400mg, 6 tabs/day    I spent 35 minutes in the care of this patient today, which included time necessary for preparation for the visit, obtaining history, ordering medications/tests/procedures as medically indicated, review of pertinent medical literature, counseling of the patient, communication of recommendations to the care team, and documentation time.       Lashanda Figueroa pa-c  [phone]

## 2021-09-14 NOTE — LETTER
9/14/2021         RE: Nik Nava  [address] [facility] [address]        Dear Colleague,    Thank you for referring your patient, Nik Nava, to the [facility] BLOOD AND MARROW TRANSPLANT PROGRAM [place]. Please see a copy of my visit note below.    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  This is on hold since Friday and his stools are now normal.  No n/v.  No further rash.  No fever.  No bleeding.  Feeling good.  No new complaints today.      Review of Systems                                                                                                                                         10 point Review of systems was negative other than noted above.      PHYSICAL EXAM                                                                                                                                                 KPS: 80  /81   Pulse 93   Temp 97.9  F (36.6  C) (Oral)   Resp 16   Wt 104.5 kg (230 lb 4.8 oz)   SpO2 98%   BMI 31.23 kg/m      General: NAD   HEENT: sclera anicteric. OP clear  Lungs: CTAB  Heart: RRR  Abd:  + BS. Soft. No tenderness with palpation  Skin: no rash currently  Neuro: A&O, speech normal  EXT:  trace LE edema  Vascular Access: port in the right chest, left double lumen blake not tender     KPS=90  Current aGVHD staging:  Skin 0,  UGI 0, LGI 0, Liver 0 (9/7/2021)    Labs:  Lab Results   Component Value Date    WBC 6.6 09/14/2021    ANEU 1.9 09/07/2021    HGB 9.2 (L) 09/14/2021    HCT 26.8 (L) 09/14/2021     09/14/2021     (L) 09/12/2021    POTASSIUM 4.5 09/12/2021    CHLORIDE 100 09/12/2021    CO2 22 09/12/2021     (H) 09/12/2021    BUN 29 09/12/2021    CR 1.49 (H) 09/12/2021    MAG 1.5 (L) 09/09/2021    INR 0.97 09/02/2021      OVERALL ASSESSMENT AND PLAN   Nik Nava is a 61 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, 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  Acute lymphoblastic leukemia, [place] chromosome positive in CR, MRD neg.  Here for flu/cy/TBI and sib allo sct  HCT-CI score: 3 (prior solid tumor)  - Off G-CSF   - 9/2/2021 day +21 BMB: Slightly hypocellular marrow (cellularity estimated as 20 to 30%) with trilineage hematopoiesis, less than 1% blasts. No morphologic or immunophenotypic evidence for B lymphoblastic leukemia/lymphoma. BM DNA Chimerism 90% donor, FISH/CG pending.   - VNTR 94% CD33 lineage, 84% CD3 lineage (day 30 PB)     2.  HEME:   - Pt/donor both Opos  - Transfuse for hgb <7g/dL; plt < 10k.  No transfusions needs  - Recent pulmonary emboli: He developed a pulmonary emboli in the setting of receiving PEG asparaginase. On xarelto     3.  FEN/renal:   - hypomag on tac.  Mag today=1.5, no changes  - He has a history of renal cancer and resection. has a single kidney.  - Creatinine up to 1.8 - drinking 2 L today.  Empiric dose reduction of tac--see below     4.    GVHD:   - Faint rash on back and chest. Skin bx 8/30 = interface dermatitis suggestive of GVHD. Using topical TMC and improving  - On tac 3.5mg PO BID, Level on  9/7: 6.2 -- but 19hr trough to level higher.  Level pending from today. Will decrease to 3mg BID today while awaiting level due to rise in Cr.  - MMF through D+30  stopped  9/10    5.  ID: afebrile.  No active infections  - prophy acv/letrmovir, fluconazole,  - sulfa allergic; pentamidine at day +28- inhaled pentamidine 9/7   - CMV negative 9/4.      6. Hx of graves disease; On synthroid     7. GI:   - Dyspepsia, instructed to take PPI 30 min before pills or breakfast was taking after am coffee, cont oral mag for now. May be MMF vs gvh vs other.      8. CV: Hypertension. Likely secondary to tacrolimus. BP in normal range, cont on norvasc 7.5mg     RTC Friday for labs and provider visit.   Reduced tac to 3mg PO BID as level is pending and Cr is up. Will make further changes based on today's level  Increase Mg to 400mg, 6 tabs/day    I spent 35 minutes in the care of this patient today, which included time necessary for preparation for the visit, obtaining history, ordering medications/tests/procedures as medically indicated, review of pertinent medical literature, counseling of the patient, communication of recommendations to the care team, and documentation time.       Lashanda Figueroa pa-c  [phone]        Again, thank you for allowing me to participate in the care of your patient.        Sincerely,        BMT Advanced Practice Provider

## 2021-09-14 NOTE — NURSING NOTE
Oncology Rooming Note    September 14, 2021 10:48 AM   Nik Nava is a 61 year old male who presents for:    Chief Complaint   Patient presents with     Labs Only     labs drawn via port by RN in lab, saline and heparin locked, vitals completed     Oncology Clinic Visit     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)     Initial Vitals: /81   Pulse 93   Temp 97.9  F (36.6  C) (Oral)   Resp 16   Wt 104.5 kg (230 lb 4.8 oz)   SpO2 98%   BMI 31.23 kg/m   Estimated body mass index is 31.23 kg/m  as calculated from the following:    Height as of 9/7/21: 1.829 m (6').    Weight as of this encounter: 104.5 kg (230 lb 4.8 oz). Body surface area is 2.3 meters squared.  No Pain (0) Comment: Data Unavailable   No LMP for male patient.  Allergies reviewed: Yes  Medications reviewed: Yes    Medications: MEDICATION REFILLS NEEDED TODAY. Provider was notified.  Pharmacy name entered into [employer]:    [facility] - [place], [place] - [address] [facility] - [place], [place] - [phone]    Clinical concerns: New concerns: Hospital visit Sunday for left-sided face swelling, patient taking Levaquin since. Held Tacro for today's lab draw. Please refill acyclovir.      Missy Waterman LPN September 14, 2021 10:50 AM

## 2021-09-17 ENCOUNTER — APPOINTMENT (OUTPATIENT)
Dept: LAB | Facility: CLINIC | Age: 61
End: 2021-09-17
Attending: PHYSICIAN ASSISTANT
Payer: COMMERCIAL

## 2021-09-17 ENCOUNTER — ONCOLOGY VISIT (OUTPATIENT)
Dept: TRANSPLANT | Facility: CLINIC | Age: 61
End: 2021-09-17
Attending: PHYSICIAN ASSISTANT
Payer: COMMERCIAL

## 2021-09-17 VITALS
WEIGHT: 213 LBS | SYSTOLIC BLOOD PRESSURE: 116 MMHG | BODY MASS INDEX: 28.85 KG/M2 | DIASTOLIC BLOOD PRESSURE: 74 MMHG | OXYGEN SATURATION: 98 % | HEART RATE: 87 BPM | RESPIRATION RATE: 16 BRPM | HEIGHT: 72 IN | TEMPERATURE: 97.8 F

## 2021-09-17 DIAGNOSIS — Z53.9 ERRONEOUS ENCOUNTER--DISREGARD: Primary | ICD-10-CM

## 2021-09-17 DIAGNOSIS — C91.01 ACUTE LYMPHOBLASTIC LEUKEMIA (ALL) IN REMISSION (H): ICD-10-CM

## 2021-09-17 LAB
ANION GAP SERPL CALCULATED.3IONS-SCNC: 8 MMOL/L (ref 3–14)
BACTERIA BLD CULT: NO GROWTH
BASOPHILS # BLD AUTO: 0 10E3/UL (ref 0–0.2)
BASOPHILS NFR BLD AUTO: 1 %
BUN SERPL-MCNC: 25 MG/DL (ref 7–30)
CALCIUM SERPL-MCNC: 9.8 MG/DL (ref 8.5–10.1)
CHLORIDE BLD-SCNC: 103 MMOL/L (ref 94–109)
CO2 SERPL-SCNC: 25 MMOL/L (ref 20–32)
CREAT SERPL-MCNC: 1.75 MG/DL (ref 0.66–1.25)
EOSINOPHIL # BLD AUTO: 0.3 10E3/UL (ref 0–0.7)
EOSINOPHIL NFR BLD AUTO: 4 %
ERYTHROCYTE [DISTWIDTH] IN BLOOD BY AUTOMATED COUNT: 15.7 % (ref 10–15)
GFR SERPL CREATININE-BSD FRML MDRD: 41 ML/MIN/1.73M2
GLUCOSE BLD-MCNC: 100 MG/DL (ref 70–99)
HCT VFR BLD AUTO: 27.6 % (ref 40–53)
HGB BLD-MCNC: 9.3 G/DL (ref 13.3–17.7)
IMM GRANULOCYTES # BLD: 0.1 10E3/UL
IMM GRANULOCYTES NFR BLD: 2 %
LYMPHOCYTES # BLD AUTO: 0.9 10E3/UL (ref 0.8–5.3)
LYMPHOCYTES NFR BLD AUTO: 13 %
MAGNESIUM SERPL-MCNC: 1.5 MG/DL (ref 1.6–2.3)
MCH RBC QN AUTO: 32.5 PG (ref 26.5–33)
MCHC RBC AUTO-ENTMCNC: 33.7 G/DL (ref 31.5–36.5)
MCV RBC AUTO: 97 FL (ref 78–100)
MONOCYTES # BLD AUTO: 1.3 10E3/UL (ref 0–1.3)
MONOCYTES NFR BLD AUTO: 17 %
NEUTROPHILS # BLD AUTO: 4.7 10E3/UL (ref 1.6–8.3)
NEUTROPHILS NFR BLD AUTO: 63 %
NRBC # BLD AUTO: 0 10E3/UL
NRBC BLD AUTO-RTO: 0 /100
PLATELET # BLD AUTO: 190 10E3/UL (ref 150–450)
POTASSIUM BLD-SCNC: 4.8 MMOL/L (ref 3.4–5.3)
RBC # BLD AUTO: 2.86 10E6/UL (ref 4.4–5.9)
SODIUM SERPL-SCNC: 136 MMOL/L (ref 133–144)
WBC # BLD AUTO: 7.4 10E3/UL (ref 4–11)

## 2021-09-17 PROCEDURE — 85025 COMPLETE CBC W/AUTO DIFF WBC: CPT

## 2021-09-17 PROCEDURE — 80048 BASIC METABOLIC PNL TOTAL CA: CPT

## 2021-09-17 PROCEDURE — G0463 HOSPITAL OUTPT CLINIC VISIT: HCPCS

## 2021-09-17 PROCEDURE — 99214 OFFICE O/P EST MOD 30 MIN: CPT

## 2021-09-17 PROCEDURE — 83735 ASSAY OF MAGNESIUM: CPT

## 2021-09-17 PROCEDURE — 36591 DRAW BLOOD OFF VENOUS DEVICE: CPT

## 2021-09-17 PROCEDURE — 250N000011 HC RX IP 250 OP 636: Performed by: PHYSICIAN ASSISTANT

## 2021-09-17 RX ORDER — URSODIOL 300 MG/1
300 CAPSULE ORAL 3 TIMES DAILY
Qty: 90 CAPSULE | Refills: 1 | Status: SHIPPED | OUTPATIENT
Start: 2021-09-17 | End: 2021-10-12

## 2021-09-17 RX ORDER — HEPARIN SODIUM (PORCINE) LOCK FLUSH IV SOLN 100 UNIT/ML 100 UNIT/ML
5 SOLUTION INTRAVENOUS EVERY 8 HOURS PRN
Status: DISCONTINUED | OUTPATIENT
Start: 2021-09-17 | End: 2021-09-17 | Stop reason: HOSPADM

## 2021-09-17 RX ADMIN — Medication 5 ML: at 10:25

## 2021-09-17 ASSESSMENT — PAIN SCALES - GENERAL: PAINLEVEL: NO PAIN (0)

## 2021-09-17 ASSESSMENT — MIFFLIN-ST. JEOR: SCORE: 1799.29

## 2021-09-17 NOTE — PROGRESS NOTES
"BMT Clinic Note    ID:  Nik Nava is a 61 year old male with Ph+ ALL, day +38 s/p sib allo stem cell transplant     CC: Routine f/u     Oncology Treatment History:            Treatment/Chemotherapy Number of Cycles Date Range Outcomes & Complications   Dexamethasone/desatinib One month Jan 2021- Feb 2021 Bone marrow 3% blasts   Vincristine/pred/dauno/pegasparaginase & IT methotrexate   Two cycles    HD-AraC One cycle June 2021 MRD neg CR   pred 10mg/day + dasatinib 140mg/day     8/10/2021 MSD PBSCT VER[place]NICA Flu/CY/TBI   - 8/1/2021        Interval History:   Seen for follow up.  He is doing really well.  He has no new complaints.  No rash.  Eating/drinking well.  Afebrile.  No rash.      Review of Systems                                                                                                                                         10 point Review of systems was negative other than noted above.      PHYSICAL EXAM                                                                                                                                                 KPS: 80  /74   Pulse 87   Temp 97.8  F (36.6  C) (Oral)   Resp 16   Ht 1.829 m (6' 0.01\")   Wt 96.6 kg (213 lb)   SpO2 98%   BMI 28.88 kg/m      General: NAD   HEENT: sclera anicteric. OP clear  Lungs: CTAB  Heart: RRR  Abd:  + BS. Soft. No tenderness with palpation  Skin: no rash currently  Neuro: A&O, speech normal  EXT:  trace LE edema  Vascular Access: port in the right chest, left double lumen blake not tender     KPS=90  Current aGVHD staging:  Skin 0, UGI 0, LGI 0, Liver 0 (9/7/2021)    Labs:  Lab Results   Component Value Date    WBC 7.4 09/17/2021    ANEU 1.9 09/07/2021    HGB 9.3 (L) 09/17/2021    HCT 27.6 (L) 09/17/2021     09/17/2021     09/17/2021    POTASSIUM 4.8 09/17/2021    CHLORIDE 103 09/17/2021    CO2 25 09/17/2021     (H) 09/17/2021    BUN 25 09/17/2021    CR 1.75 (H) 09/17/2021    MAG 1.5 (L) 09/17/2021 "    INR 0.97 09/02/2021      OVERALL ASSESSMENT AND PLAN   Nik Nava is a 61 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, day +38 s/p sib allo stem cell transplant    Day -6 (8/4): flu/cy  Day -5 through day -2 (8/5-8/8): flu  Day -1 (8/9): TBI  Day 0 (8/10): transplant     1.  Acute lymphoblastic leukemia, [place] chromosome positive in CR, MRD neg.  Here for flu/cy/TBI and sib allo sct  HCT-CI score: 3 (prior solid tumor)  - Off G-CSF   - 9/2/2021 day +21 BMB: Slightly hypocellular marrow (cellularity estimated as 20 to 30%) with trilineage hematopoiesis, less than 1% blasts. No morphologic or immunophenotypic evidence for B lymphoblastic leukemia/lymphoma. BM DNA Chimerism 90% donor, FISH/CG pending.   - VNTR 94% CD33 lineage, 84% CD3 lineage (day 30 PB)     2.  HEME:   - Pt/donor both Opos  - Transfuse for hgb <7g/dL; plt < 10k.  No transfusions needs  - Recent pulmonary emboli: He developed a pulmonary emboli in the setting of receiving PEG asparaginase. On xarelto     3.  FEN/renal:   - hypomag on tac. Mag today=1.5, continue 6 tabs/day  - He has a history of renal cancer and resection. has a single kidney.  - Creatinine up to 1.8--stable - drinking 2 L today.  Running tac a little low: 6.4     4.    GVHD:   - Faint rash on back and chest. Skin bx 8/30 = interface dermatitis suggestive of GVHD. Using topical TMC and improving  - On tac 3mg BID, level next visit  - MMF through D+30  stopped 9/10    5.  ID: afebrile.  No active infections  - prophy acv/letrmovir, fluconazole,  - sulfa allergic; pentamidine at day +28- inhaled pentamidine 9/7   - CMV negative 9/4.      6. Hx of graves disease; On synthroid     7. GI:   - Dyspepsia, instructed to take PPI 30 min before pills or breakfast was taking after am coffee, cont oral mag for now. May be MMF vs gvh vs other.      8. CV: Hypertension. Likely secondary to tacrolimus. BP in normal range, cont on norvasc 7.5mg     RTC   Tuesday for labs and provider visit.   Then  maybe move to once/week  Continue tac 3mg PO BID, level next tuesday  Continue PO Mg to 400mg, 6 tabs/day    I spent 30 minutes in the care of this patient today, which included time necessary for preparation for the visit, obtaining history, ordering medications/tests/procedures as medically indicated, review of pertinent medical literature, counseling of the patient, communication of recommendations to the care team, and documentation time.       Lashanda Figueroa pa-c  [phone]

## 2021-09-17 NOTE — NURSING NOTE
"Oncology Rooming Note    September 17, 2021 11:01 AM   Nik Nava is a 61 year old male who presents for:    Chief Complaint   Patient presents with     Port Draw     Labs drawn via PORT by RN in lab. VS taken,.      Oncology Clinic Visit     [facility] RETURN - ALL     Initial Vitals: /74   Pulse 87   Temp 97.8  F (36.6  C) (Oral)   Resp 16   Ht 1.829 m (6' 0.01\")   Wt 96.6 kg (213 lb)   SpO2 98%   BMI 28.88 kg/m   Estimated body mass index is 28.88 kg/m  as calculated from the following:    Height as of this encounter: 1.829 m (6' 0.01\").    Weight as of this encounter: 96.6 kg (213 lb). Body surface area is 2.22 meters squared.  No Pain (0) Comment: Data Unavailable   No LMP for male patient.  Allergies reviewed: Yes  Medications reviewed: Yes    Medications: Medication refills not needed today.  Pharmacy name entered into Int[employer]:    [facility] - [place], [place] - [address] [facility] [address] [facility] [place], [place] - [phone]    Clinical concerns: No new concerns. Lashanda was notified.      Kelvin Gomez LPN            "

## 2021-09-17 NOTE — LETTER
"    9/17/2021         RE: Nik Nava  [address] [facility] [address]        Dear Colleague,    Thank you for referring your patient, Nik Nava, to the [facility] BLOOD AND MARROW TRANSPLANT PROGRAM [place]. Please see a copy of my visit note below.    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  He is doing really well.  He has no new complaints.  No rash.  Eating/drinking well.  Afebrile.  No rash.      Review of Systems                                                                                                                                         10 point Review of systems was negative other than noted above.      PHYSICAL EXAM                                                                                                                                                 KPS: 80  /74   Pulse 87   Temp 97.8  F (36.6  C) (Oral)   Resp 16   Ht 1.829 m (6' 0.01\")   Wt 96.6 kg (213 lb)   SpO2 98%   BMI 28.88 kg/m      General: NAD   HEENT: sclera anicteric. OP clear  Lungs: CTAB  Heart: RRR  Abd:  + BS. Soft. No tenderness with palpation  Skin: no rash currently  Neuro: A&O, speech normal  EXT:  trace LE edema  Vascular Access: port in the right chest, left double lumen blake not tender     KPS=90  Current aGVHD staging:  Skin 0, UGI 0, LGI 0, Liver 0 (9/7/2021)    Labs:  Lab Results   Component Value Date    WBC 7.4 09/17/2021    ANEU " 1.9 09/07/2021    HGB 9.3 (L) 09/17/2021    HCT 27.6 (L) 09/17/2021     09/17/2021     09/17/2021    POTASSIUM 4.8 09/17/2021    CHLORIDE 103 09/17/2021    CO2 25 09/17/2021     (H) 09/17/2021    BUN 25 09/17/2021    CR 1.75 (H) 09/17/2021    MAG 1.5 (L) 09/17/2021    INR 0.97 09/02/2021      OVERALL ASSESSMENT AND PLAN   Nik Nava is a 61 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, day +38 s/p sib allo stem cell transplant    Day -6 (8/4): flu/cy  Day -5 through day -2 (8/5-8/8): flu  Day -1 (8/9): TBI  Day 0 (8/10): transplant     1.  Acute lymphoblastic leukemia, [place] chromosome positive in CR, MRD neg.  Here for flu/cy/TBI and sib allo sct  HCT-CI score: 3 (prior solid tumor)  - Off G-CSF   - 9/2/2021 day +21 BMB: Slightly hypocellular marrow (cellularity estimated as 20 to 30%) with trilineage hematopoiesis, less than 1% blasts. No morphologic or immunophenotypic evidence for B lymphoblastic leukemia/lymphoma. BM DNA Chimerism 90% donor, FISH/CG pending.   - VNTR 94% CD33 lineage, 84% CD3 lineage (day 30 PB)     2.  HEME:   - Pt/donor both Opos  - Transfuse for hgb <7g/dL; plt < 10k.  No transfusions needs  - Recent pulmonary emboli: He developed a pulmonary emboli in the setting of receiving PEG asparaginase. On xarelto     3.  FEN/renal:   - hypomag on tac. Mag today=1.5, continue 6 tabs/day  - He has a history of renal cancer and resection. has a single kidney.  - Creatinine up to 1.8--stable - drinking 2 L today.  Running tac a little low: 6.4     4.    GVHD:   - Faint rash on back and chest. Skin bx 8/30 = interface dermatitis suggestive of GVHD. Using topical TMC and improving  - On tac 3mg BID, level next visit  - MMF through D+30  stopped 9/10    5.  ID: afebrile.  No active infections  - prophy acv/letrmovir, fluconazole,  - sulfa allergic; pentamidine at day +28- inhaled pentamidine 9/7   - CMV negative 9/4.      6. Hx of graves disease; On synthroid     7. GI:   - Dyspepsia,  instructed to take PPI 30 min before pills or breakfast was taking after am coffee, cont oral mag for now. May be MMF vs gvh vs other.      8. CV: Hypertension. Likely secondary to tacrolimus. BP in normal range, cont on norvasc 7.5mg     RTC   Tuesday for labs and provider visit.   Then maybe move to once/week  Continue tac 3mg PO BID, level next tuesday  Continue PO Mg to 400mg, 6 tabs/day    I spent 30 minutes in the care of this patient today, which included time necessary for preparation for the visit, obtaining history, ordering medications/tests/procedures as medically indicated, review of pertinent medical literature, counseling of the patient, communication of recommendations to the care team, and documentation time.       Lashanda Figueroa pa-c  [phone]        Again, thank you for allowing me to participate in the care of your patient.        Sincerely,        BMT Advanced Practice Provider

## 2021-09-17 NOTE — NURSING NOTE
Chief Complaint   Patient presents with     Port Draw     Labs drawn via PORT by RN in lab. VS taken,.      Winnie Corrales RN

## 2021-09-17 NOTE — LETTER
9/17/2021         RE: Nik Nava  [address] [address] [facility] [address]        Dear Colleague,    Thank you for referring your patient, Nik Nava, to the [facility] BLOOD AND MARROW TRANSPLANT PROGRAM [place]. Please see a copy of my visit note below.      This encounter was opened in error. Please disregard.      Again, thank you for allowing me to participate in the care of your patient.        Sincerely,        [facility]

## 2021-09-21 ENCOUNTER — OFFICE VISIT (OUTPATIENT)
Dept: TRANSPLANT | Facility: CLINIC | Age: 61
End: 2021-09-21
Attending: PHYSICIAN ASSISTANT
Payer: COMMERCIAL

## 2021-09-21 ENCOUNTER — APPOINTMENT (OUTPATIENT)
Dept: LAB | Facility: CLINIC | Age: 61
End: 2021-09-21
Attending: PHYSICIAN ASSISTANT
Payer: COMMERCIAL

## 2021-09-21 VITALS
DIASTOLIC BLOOD PRESSURE: 77 MMHG | SYSTOLIC BLOOD PRESSURE: 116 MMHG | HEART RATE: 84 BPM | TEMPERATURE: 97.9 F | WEIGHT: 231.7 LBS | RESPIRATION RATE: 16 BRPM | BODY MASS INDEX: 31.42 KG/M2 | OXYGEN SATURATION: 99 %

## 2021-09-21 DIAGNOSIS — C91.01 ACUTE LYMPHOBLASTIC LEUKEMIA (ALL) IN REMISSION (H): ICD-10-CM

## 2021-09-21 LAB
ANION GAP SERPL CALCULATED.3IONS-SCNC: 7 MMOL/L (ref 3–14)
BASOPHILS # BLD AUTO: 0.1 10E3/UL (ref 0–0.2)
BASOPHILS NFR BLD AUTO: 1 %
BUN SERPL-MCNC: 31 MG/DL (ref 7–30)
CALCIUM SERPL-MCNC: 9.8 MG/DL (ref 8.5–10.1)
CHLORIDE BLD-SCNC: 103 MMOL/L (ref 94–109)
CO2 SERPL-SCNC: 26 MMOL/L (ref 20–32)
CREAT SERPL-MCNC: 1.58 MG/DL (ref 0.66–1.25)
EOSINOPHIL # BLD AUTO: 0.2 10E3/UL (ref 0–0.7)
EOSINOPHIL NFR BLD AUTO: 3 %
ERYTHROCYTE [DISTWIDTH] IN BLOOD BY AUTOMATED COUNT: 15.2 % (ref 10–15)
GFR SERPL CREATININE-BSD FRML MDRD: 46 ML/MIN/1.73M2
GLUCOSE BLD-MCNC: 104 MG/DL (ref 70–99)
HCT VFR BLD AUTO: 28.2 % (ref 40–53)
HGB BLD-MCNC: 9.5 G/DL (ref 13.3–17.7)
IMM GRANULOCYTES # BLD: 0.1 10E3/UL
IMM GRANULOCYTES NFR BLD: 1 %
LYMPHOCYTES # BLD AUTO: 0.9 10E3/UL (ref 0.8–5.3)
LYMPHOCYTES NFR BLD AUTO: 13 %
MAGNESIUM SERPL-MCNC: 1.8 MG/DL (ref 1.6–2.3)
MCH RBC QN AUTO: 32.9 PG (ref 26.5–33)
MCHC RBC AUTO-ENTMCNC: 33.7 G/DL (ref 31.5–36.5)
MCV RBC AUTO: 98 FL (ref 78–100)
MONOCYTES # BLD AUTO: 0.9 10E3/UL (ref 0–1.3)
MONOCYTES NFR BLD AUTO: 12 %
NEUTROPHILS # BLD AUTO: 5.2 10E3/UL (ref 1.6–8.3)
NEUTROPHILS NFR BLD AUTO: 70 %
NRBC # BLD AUTO: 0 10E3/UL
NRBC BLD AUTO-RTO: 0 /100
PLATELET # BLD AUTO: 206 10E3/UL (ref 150–450)
POTASSIUM BLD-SCNC: 4.6 MMOL/L (ref 3.4–5.3)
RBC # BLD AUTO: 2.89 10E6/UL (ref 4.4–5.9)
SODIUM SERPL-SCNC: 136 MMOL/L (ref 133–144)
TACROLIMUS BLD-MCNC: 6.7 UG/L (ref 5–15)
TME LAST DOSE: NORMAL H
TME LAST DOSE: NORMAL H
WBC # BLD AUTO: 7.4 10E3/UL (ref 4–11)

## 2021-09-21 PROCEDURE — 83735 ASSAY OF MAGNESIUM: CPT

## 2021-09-21 PROCEDURE — 250N000011 HC RX IP 250 OP 636: Performed by: PHYSICIAN ASSISTANT

## 2021-09-21 PROCEDURE — 85025 COMPLETE CBC W/AUTO DIFF WBC: CPT

## 2021-09-21 PROCEDURE — 80197 ASSAY OF TACROLIMUS: CPT

## 2021-09-21 PROCEDURE — G0463 HOSPITAL OUTPT CLINIC VISIT: HCPCS

## 2021-09-21 PROCEDURE — 80048 BASIC METABOLIC PNL TOTAL CA: CPT

## 2021-09-21 PROCEDURE — 36591 DRAW BLOOD OFF VENOUS DEVICE: CPT

## 2021-09-21 PROCEDURE — 99214 OFFICE O/P EST MOD 30 MIN: CPT

## 2021-09-21 RX ORDER — HEPARIN SODIUM (PORCINE) LOCK FLUSH IV SOLN 100 UNIT/ML 100 UNIT/ML
5 SOLUTION INTRAVENOUS ONCE
Status: COMPLETED | OUTPATIENT
Start: 2021-09-21 | End: 2021-09-21

## 2021-09-21 RX ADMIN — Medication 5 ML: at 09:46

## 2021-09-21 ASSESSMENT — PAIN SCALES - GENERAL: PAINLEVEL: NO PAIN (0)

## 2021-09-21 NOTE — PROGRESS NOTES
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  He is doing really well.  No new complaints.  No rash.  Eating/drinking well.  Afebrile.  No rash.      Review of Systems                                                                                                                                         10 point Review of systems was negative other than noted above.      PHYSICAL EXAM                                                                                                                                                 KPS: 80  /77   Pulse 84   Temp 97.9  F (36.6  C)   Resp 16   Wt 105.1 kg (231 lb 11.2 oz)   SpO2 99%   BMI 31.42 kg/m      General: NAD   HEENT: sclera anicteric. OP clear  Lungs: CTAB  Heart: RRR  Abd:  + BS. Soft. No tenderness with palpation  Skin: no rash currently  Neuro: A&O, speech normal  EXT:  trace LE edema  Vascular Access: port in the right chest     KPS=90  Current aGVHD staging:  Skin 0, UGI 0, LGI 0, Liver 0 (9/7/2021)    Labs:  Lab Results   Component Value Date    WBC 7.4 09/21/2021    ANEU 1.9 09/07/2021    HGB 9.5 (L) 09/21/2021    HCT 28.2 (L) 09/21/2021     09/21/2021     09/17/2021    POTASSIUM 4.8 09/17/2021    CHLORIDE 103 09/17/2021    CO2 25 09/17/2021     (H) 09/17/2021    BUN 25 09/17/2021    CR 1.75 (H) 09/17/2021    MAG 1.5 (L) 09/17/2021    INR 0.97 09/02/2021      OVERALL ASSESSMENT AND  ROZINA   Nik Nava is a 61 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, 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  Acute lymphoblastic leukemia, [place] chromosome positive in CR, MRD neg.  Here for flu/cy/TBI and sib allo sct  HCT-CI score: 3 (prior solid tumor)  - Off G-CSF   - 9/2/2021 day +21 BMB: Slightly hypocellular marrow (cellularity estimated as 20 to 30%) with trilineage hematopoiesis, less than 1% blasts. No morphologic or immunophenotypic evidence for B lymphoblastic leukemia/lymphoma. BM DNA Chimerism 90% donor, FISH/CG pending.   - VNTR 94% CD33 lineage, 84% CD3 lineage (day 30 PB)     2.  HEME:   - Pt/donor both Opos  - Transfuse for hgb <7g/dL; plt < 10k.  No transfusions needs  - Recent pulmonary emboli: He developed a pulmonary emboli in the setting of receiving PEG asparaginase. On xarelto     3.  FEN/renal:   - hypomag on tac. Mag today=1.8, continue 6 tabs/day  - He has a history of renal cancer and resection. has a single kidney.  - Creatinine up to 1.8--stable - drinking 2 L today.  Running tac a little low: 6.4--level pending from today     4.    GVHD:   - Faint rash on back and chest. Skin bx 8/30 = interface dermatitis suggestive of GVHD. Using topical TMC and improving  - On tac 3mg BID, level next visit  - MMF through D+30  stopped 9/10    5.  ID: afebrile.  No active infections  - prophy acv/letrmovir, fluconazole,  - sulfa allergic; pentamidine at day +28- inhaled pentamidine 9/7   - CMV negative 9/4.      6. Hx of graves disease; On synthroid     7. GI:   - Dyspepsia, instructed to take PPI 30 min before pills or breakfast was taking after am coffee, cont oral mag for now. May be MMF vs gvh vs other.      8. CV: Hypertension. Likely secondary to tacrolimus. BP in normal range, cont on norvasc 7.5mg     RTC   Tuesday for labs and provider visit.     Continue tac 3mg PO BID, level  pending  Continue PO Mg to 400mg, 6 tabs/day  10/8 w/ Dr. Hill (primary; as DOM)      I spent 30 minutes in the care of this patient today, which included time necessary for preparation for the visit, obtaining history, ordering medications/tests/procedures as medically indicated, review of pertinent medical literature, counseling of the patient, communication of recommendations to the care team, and documentation time.       Lashanda Figueroa pa-c  [phone]

## 2021-09-21 NOTE — NURSING NOTE
"Oncology Rooming Note    September 21, 2021 9:56 AM   Nik Nava is a 61 year old male who presents for:    Chief Complaint   Patient presents with     Port Draw     Labs drawn via port by RN. VS taken.     Oncology Clinic Visit     Acute lymphoblastic leukemia      Initial Vitals: /77   Pulse 84   Temp 97.9  F (36.6  C)   Resp 16   Wt 105.1 kg (231 lb 11.2 oz)   SpO2 99%   BMI 31.42 kg/m   Estimated body mass index is 31.42 kg/m  as calculated from the following:    Height as of 9/17/21: 1.829 m (6' 0.01\").    Weight as of this encounter: 105.1 kg (231 lb 11.2 oz). Body surface area is 2.31 meters squared.  No Pain (0) Comment: Data Unavailable   No LMP for male patient.  Allergies reviewed: Yes  Medications reviewed: Yes    Medications: Medication refills not needed today.  Pharmacy name entered into [employer]:    [facility] - [place], [place] - [phone] [facility] [place], [place] - [phone]    Clinical concerns: None       Carmen Gottlieb LPN              "

## 2021-09-21 NOTE — LETTER
9/21/2021         RE: Nik Nava  [address] [address] [address]        Dear Colleague,    Thank you for referring your patient, Nik Nava, to the [facility] BLOOD AND MARROW TRANSPLANT PROGRAM [place]. Please see a copy of my visit note below.    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  He is doing really well.  No new complaints.  No rash.  Eating/drinking well.  Afebrile.  No rash.      Review of Systems                                                                                                                                         10 point Review of systems was negative other than noted above.      PHYSICAL EXAM                                                                                                                                                 KPS: 80  /77   Pulse 84   Temp 97.9  F (36.6  C)   Resp 16   Wt 105.1 kg (231 lb 11.2 oz)   SpO2 99%   BMI 31.42 kg/m      General: NAD   HEENT: sclera anicteric. OP clear  Lungs: CTAB  Heart: RRR  Abd:  + BS. Soft. No tenderness with palpation  Skin: no rash currently  Neuro: A&O, speech normal  EXT:  trace LE edema  Vascular Access: port in the right chest     KPS=90  Current aGVHD staging:  Skin 0, UGI 0, LGI 0, Liver 0 (9/7/2021)    Labs:  Lab Results   Component Value Date    WBC 7.4 09/21/2021    ANEU 1.9 09/07/2021    HGB 9.5 (L) 09/21/2021    HCT 28.2  (L) 09/21/2021     09/21/2021     09/17/2021    POTASSIUM 4.8 09/17/2021    CHLORIDE 103 09/17/2021    CO2 25 09/17/2021     (H) 09/17/2021    BUN 25 09/17/2021    CR 1.75 (H) 09/17/2021    MAG 1.5 (L) 09/17/2021    INR 0.97 09/02/2021      OVERALL ASSESSMENT AND PLAN   Nik Nava is a 61 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, 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  Acute lymphoblastic leukemia, [place] chromosome positive in CR, MRD neg.  Here for flu/cy/TBI and sib allo sct  HCT-CI score: 3 (prior solid tumor)  - Off G-CSF   - 9/2/2021 day +21 BMB: Slightly hypocellular marrow (cellularity estimated as 20 to 30%) with trilineage hematopoiesis, less than 1% blasts. No morphologic or immunophenotypic evidence for B lymphoblastic leukemia/lymphoma. BM DNA Chimerism 90% donor, FISH/CG pending.   - VNTR 94% CD33 lineage, 84% CD3 lineage (day 30 PB)     2.  HEME:   - Pt/donor both Opos  - Transfuse for hgb <7g/dL; plt < 10k.  No transfusions needs  - Recent pulmonary emboli: He developed a pulmonary emboli in the setting of receiving PEG asparaginase. On xarelto     3.  FEN/renal:   - hypomag on tac. Mag today=1.8, continue 6 tabs/day  - He has a history of renal cancer and resection. has a single kidney.  - Creatinine up to 1.8--stable - drinking 2 L today.  Running tac a little low: 6.4--level pending from today     4.    GVHD:   - Faint rash on back and chest. Skin bx 8/30 = interface dermatitis suggestive of GVHD. Using topical TMC and improving  - On tac 3mg BID, level next visit  - MMF through D+30  stopped 9/10    5.  ID: afebrile.  No active infections  - prophy acv/letrmovir, fluconazole,  - sulfa allergic; pentamidine at day +28- inhaled pentamidine 9/7   - CMV negative 9/4.      6. Hx of graves disease; On synthroid     7. GI:   - Dyspepsia, instructed to take PPI 30 min  before pills or breakfast was taking after am coffee, cont oral mag for now. May be MMF vs gvh vs other.      8. CV: Hypertension. Likely secondary to tacrolimus. BP in normal range, cont on norvasc 7.5mg     RTC   Tuesday for labs and provider visit.     Continue tac 3mg PO BID, level pending  Continue PO Mg to 400mg, 6 tabs/day  10/8 w/ Dr. Hill (primary; as DOM)      I spent 30 minutes in the care of this patient today, which included time necessary for preparation for the visit, obtaining history, ordering medications/tests/procedures as medically indicated, review of pertinent medical literature, counseling of the patient, communication of recommendations to the care team, and documentation time.       Lashanda Figueroa pa-c  [phone]        Again, thank you for allowing me to participate in the care of your patient.        Sincerely,        BMT Advanced Practice Provider

## 2021-09-26 ENCOUNTER — HEALTH MAINTENANCE LETTER (OUTPATIENT)
Age: 61
End: 2021-09-26

## 2021-09-28 ENCOUNTER — OFFICE VISIT (OUTPATIENT)
Dept: TRANSPLANT | Facility: CLINIC | Age: 61
End: 2021-09-28
Attending: PHYSICIAN ASSISTANT
Payer: COMMERCIAL

## 2021-09-28 ENCOUNTER — APPOINTMENT (OUTPATIENT)
Dept: LAB | Facility: CLINIC | Age: 61
End: 2021-09-28
Attending: PHYSICIAN ASSISTANT
Payer: COMMERCIAL

## 2021-09-28 VITALS
DIASTOLIC BLOOD PRESSURE: 83 MMHG | SYSTOLIC BLOOD PRESSURE: 138 MMHG | WEIGHT: 227.8 LBS | TEMPERATURE: 98 F | RESPIRATION RATE: 16 BRPM | BODY MASS INDEX: 30.89 KG/M2 | HEART RATE: 93 BPM | OXYGEN SATURATION: 97 %

## 2021-09-28 DIAGNOSIS — C91.01 ACUTE LYMPHOBLASTIC LEUKEMIA (ALL) IN REMISSION (H): ICD-10-CM

## 2021-09-28 LAB
ALBUMIN SERPL-MCNC: 3.6 G/DL (ref 3.4–5)
ALP SERPL-CCNC: 73 U/L (ref 40–150)
ALT SERPL W P-5'-P-CCNC: 17 U/L (ref 0–70)
ANION GAP SERPL CALCULATED.3IONS-SCNC: 8 MMOL/L (ref 3–14)
AST SERPL W P-5'-P-CCNC: 16 U/L (ref 0–45)
BASOPHILS # BLD AUTO: 0 10E3/UL (ref 0–0.2)
BASOPHILS NFR BLD AUTO: 1 %
BILIRUB SERPL-MCNC: 0.4 MG/DL (ref 0.2–1.3)
BUN SERPL-MCNC: 38 MG/DL (ref 7–30)
CALCIUM SERPL-MCNC: 9.8 MG/DL (ref 8.5–10.1)
CHLORIDE BLD-SCNC: 106 MMOL/L (ref 94–109)
CO2 SERPL-SCNC: 25 MMOL/L (ref 20–32)
CREAT SERPL-MCNC: 1.62 MG/DL (ref 0.66–1.25)
EOSINOPHIL # BLD AUTO: 0.3 10E3/UL (ref 0–0.7)
EOSINOPHIL NFR BLD AUTO: 4 %
ERYTHROCYTE [DISTWIDTH] IN BLOOD BY AUTOMATED COUNT: 14.9 % (ref 10–15)
GFR SERPL CREATININE-BSD FRML MDRD: 45 ML/MIN/1.73M2
GLUCOSE BLD-MCNC: 113 MG/DL (ref 70–99)
HCT VFR BLD AUTO: 27.5 % (ref 40–53)
HGB BLD-MCNC: 9.2 G/DL (ref 13.3–17.7)
IMM GRANULOCYTES # BLD: 0 10E3/UL
IMM GRANULOCYTES NFR BLD: 1 %
LYMPHOCYTES # BLD AUTO: 0.9 10E3/UL (ref 0.8–5.3)
LYMPHOCYTES NFR BLD AUTO: 13 %
MAGNESIUM SERPL-MCNC: 1.6 MG/DL (ref 1.6–2.3)
MCH RBC QN AUTO: 32.9 PG (ref 26.5–33)
MCHC RBC AUTO-ENTMCNC: 33.5 G/DL (ref 31.5–36.5)
MCV RBC AUTO: 98 FL (ref 78–100)
MONOCYTES # BLD AUTO: 1 10E3/UL (ref 0–1.3)
MONOCYTES NFR BLD AUTO: 14 %
NEUTROPHILS # BLD AUTO: 4.5 10E3/UL (ref 1.6–8.3)
NEUTROPHILS NFR BLD AUTO: 67 %
NRBC # BLD AUTO: 0 10E3/UL
NRBC BLD AUTO-RTO: 0 /100
PLATELET # BLD AUTO: 195 10E3/UL (ref 150–450)
POTASSIUM BLD-SCNC: 4.9 MMOL/L (ref 3.4–5.3)
PROT SERPL-MCNC: 7.2 G/DL (ref 6.8–8.8)
RBC # BLD AUTO: 2.8 10E6/UL (ref 4.4–5.9)
SODIUM SERPL-SCNC: 139 MMOL/L (ref 133–144)
TACROLIMUS BLD-MCNC: 8.2 UG/L (ref 5–15)
TME LAST DOSE: NORMAL H
TME LAST DOSE: NORMAL H
WBC # BLD AUTO: 6.7 10E3/UL (ref 4–11)

## 2021-09-28 PROCEDURE — 80197 ASSAY OF TACROLIMUS: CPT

## 2021-09-28 PROCEDURE — 99214 OFFICE O/P EST MOD 30 MIN: CPT

## 2021-09-28 PROCEDURE — 80053 COMPREHEN METABOLIC PANEL: CPT

## 2021-09-28 PROCEDURE — 83735 ASSAY OF MAGNESIUM: CPT

## 2021-09-28 PROCEDURE — 36591 DRAW BLOOD OFF VENOUS DEVICE: CPT

## 2021-09-28 PROCEDURE — G0463 HOSPITAL OUTPT CLINIC VISIT: HCPCS

## 2021-09-28 PROCEDURE — 250N000011 HC RX IP 250 OP 636: Performed by: PHYSICIAN ASSISTANT

## 2021-09-28 PROCEDURE — 85025 COMPLETE CBC W/AUTO DIFF WBC: CPT

## 2021-09-28 RX ORDER — HEPARIN SODIUM (PORCINE) LOCK FLUSH IV SOLN 100 UNIT/ML 100 UNIT/ML
5 SOLUTION INTRAVENOUS EVERY 8 HOURS
Status: DISCONTINUED | OUTPATIENT
Start: 2021-09-28 | End: 2021-09-28 | Stop reason: HOSPADM

## 2021-09-28 RX ADMIN — Medication 5 ML: at 09:28

## 2021-09-28 ASSESSMENT — PAIN SCALES - GENERAL: PAINLEVEL: NO PAIN (0)

## 2021-09-28 NOTE — NURSING NOTE
Chief Complaint   Patient presents with     Labs Only     labs drawn via port by RN in lab, saline and heparin locked, vitals completed

## 2021-09-28 NOTE — LETTER
9/28/2021         RE: Nik Nava  [address] [address] [address]        Dear Colleague,    Thank you for referring your patient, Nik Nava, to the [facility] BLOOD AND MARROW TRANSPLANT PROGRAM [place]. Please see a copy of my visit note below.    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  He is doing really well.  No new complaints.  No rash.  Eating/drinking well.  Afebrile.  No rash.  Wants to get the flu shot when able.    Review of Systems                                                                                                                                         10 point Review of systems was negative other than noted above.      PHYSICAL EXAM                                                                                                                                                 KPS: 80  /83   Pulse 93   Temp 98  F (36.7  C) (Oral)   Resp 16   Wt 103.3 kg (227 lb 12.8 oz)   SpO2 97%   BMI 30.89 kg/m      General: NAD   HEENT: sclera anicteric. OP clear  Lungs: CTAB  Heart: RRR  Abd:  + BS. Soft. No tenderness with palpation  Skin: no rash currently  Neuro: A&O, speech normal  EXT:  trace LE edema  Vascular Access: port in the right chest     KPS=90  Current aGVHD staging:  Skin 0, UGI 0, LGI 0, Liver 0 (9/28/2021)    Labs:  Lab Results   Component Value Date    WBC 6.7 09/28/2021    ANEU 1.9  09/07/2021    HGB 9.2 (L) 09/28/2021    HCT 27.5 (L) 09/28/2021     09/28/2021     09/21/2021    POTASSIUM 4.6 09/21/2021    CHLORIDE 103 09/21/2021    CO2 26 09/21/2021     (H) 09/21/2021    BUN 31 (H) 09/21/2021    CR 1.58 (H) 09/21/2021    MAG 1.8 09/21/2021    INR 0.97 09/02/2021      OVERALL ASSESSMENT AND PLAN   Nik Nava is a 61 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, day +49 s/p sib allo stem cell transplant    Day -6 (8/4): flu/cy  Day -5 through day -2 (8/5-8/8): flu  Day -1 (8/9): TBI  Day 0 (8/10): transplant     1.  Acute lymphoblastic leukemia, [place] chromosome positive in CR, MRD neg.  Here for flu/cy/TBI and sib allo sct  HCT-CI score: 3 (prior solid tumor)  - Off G-CSF   - 9/2/2021 day +21 BMB: Slightly hypocellular marrow (cellularity estimated as 20 to 30%) with trilineage hematopoiesis, less than 1% blasts. No morphologic or immunophenotypic evidence for B lymphoblastic leukemia/lymphoma. BM DNA Chimerism 90% donor, FISH/CG pending.   - VNTR 94% CD33 lineage, 84% CD3 lineage (day 30 PB)     2.  HEME:   - Pt/donor both Opos  - Transfuse for hgb <7g/dL; plt < 10k.  No transfusions needs  - Recent pulmonary emboli: He developed a pulmonary emboli in the setting of receiving PEG asparaginase. On xarelto     3.  FEN/renal:   - hypomag on tac. Mag today=1.8, continue 6 tabs/day  - He has a history of renal cancer and resection. has a single kidney.  - Creatinine up to 1.6--stable - drinking almost 2 L today.  Running tac a little low: 6.7--level pending from today     4.    GVHD:   - Faint rash on back and chest. Skin bx 8/30 = interface dermatitis suggestive of GVHD. Using topical TMC and improving  - On tac 3mg BID, level next visit  - MMF through D+30  stopped 9/10    5.  ID: afebrile.  No active infections  - prophy acv/letrmovir, fluconazole,  - sulfa allergic; pentamidine at day +28- inhaled pentamidine 9/7   - CMV negative 9/4.      6. Hx of graves disease; On  synthroid     7. GI:   - Dyspepsia, instructed to take PPI 30 min before pills or breakfast was taking after am coffee, cont oral mag for now. May be MMF vs gvh vs other.      8. CV: Hypertension. Likely secondary to tacrolimus. BP in normal range, cont on norvasc 7.5mg     RTC   Continue tac 3mg PO BID, level pending  Continue PO Mg to 400mg, 6 tabs/day  10/8 w/ Dr. Hill (primary; as DOM)--add pentam IH to this day    Flu shot at day ~+60      I spent 30 minutes in the care of this patient today, which included time necessary for preparation for the visit, obtaining history, ordering medications/tests/procedures as medically indicated, review of pertinent medical literature, counseling of the patient, communication of recommendations to the care team, and documentation time.       Lashanda Figueroa pa-c  [phone]        Again, thank you for allowing me to participate in the care of your patient.        Sincerely,        BMT Advanced Practice Provider

## 2021-09-28 NOTE — NURSING NOTE
"Oncology Rooming Note    September 28, 2021 9:50 AM   Nik Nava is a 61 year old male who presents for:    Chief Complaint   Patient presents with     Labs Only     labs drawn via port by RN in lab, saline and heparin locked, vitals completed     RECHECK     ALL     Initial Vitals: /83   Pulse 93   Temp 98  F (36.7  C) (Oral)   Resp 16   Wt 103.3 kg (227 lb 12.8 oz)   SpO2 97%   BMI 30.89 kg/m   Estimated body mass index is 30.89 kg/m  as calculated from the following:    Height as of 9/17/21: 1.829 m (6' 0.01\").    Weight as of this encounter: 103.3 kg (227 lb 12.8 oz). Body surface area is 2.29 meters squared.  No Pain (0) Comment: Data Unavailable   No LMP for male patient.  Allergies reviewed: Yes  Medications reviewed: Yes    Medications: Medication refills not needed today.  Pharmacy name entered into [employer]:    [facility] - [place], [place] - [address] [facility] [place], [place] - [phone]    Clinical concerns: NONE       Suma Garza CMA              "

## 2021-09-28 NOTE — PROGRESS NOTES
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  He is doing really well.  No new complaints.  No rash.  Eating/drinking well.  Afebrile.  No rash.  Wants to get the flu shot when able.    Review of Systems                                                                                                                                         10 point Review of systems was negative other than noted above.      PHYSICAL EXAM                                                                                                                                                 KPS: 80  /83   Pulse 93   Temp 98  F (36.7  C) (Oral)   Resp 16   Wt 103.3 kg (227 lb 12.8 oz)   SpO2 97%   BMI 30.89 kg/m      General: NAD   HEENT: sclera anicteric. OP clear  Lungs: CTAB  Heart: RRR  Abd:  + BS. Soft. No tenderness with palpation  Skin: no rash currently  Neuro: A&O, speech normal  EXT:  trace LE edema  Vascular Access: port in the right chest     KPS=90  Current aGVHD staging:  Skin 0, UGI 0, LGI 0, Liver 0 (9/28/2021)    Labs:  Lab Results   Component Value Date    WBC 6.7 09/28/2021    ANEU 1.9 09/07/2021    HGB 9.2 (L) 09/28/2021    HCT 27.5 (L) 09/28/2021     09/28/2021     09/21/2021    POTASSIUM 4.6 09/21/2021    CHLORIDE 103 09/21/2021    CO2 26 09/21/2021     (H) 09/21/2021    BUN 31 (H) 09/21/2021    CR 1.58 (H) 09/21/2021    MAG 1.8 09/21/2021    INR 0.97  09/02/2021      OVERALL ASSESSMENT AND PLAN   Nik Nava is a 61 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, day +49 s/p sib allo stem cell transplant    Day -6 (8/4): flu/cy  Day -5 through day -2 (8/5-8/8): flu  Day -1 (8/9): TBI  Day 0 (8/10): transplant     1.  Acute lymphoblastic leukemia, [place] chromosome positive in CR, MRD neg.  Here for flu/cy/TBI and sib allo sct  HCT-CI score: 3 (prior solid tumor)  - Off G-CSF   - 9/2/2021 day +21 BMB: Slightly hypocellular marrow (cellularity estimated as 20 to 30%) with trilineage hematopoiesis, less than 1% blasts. No morphologic or immunophenotypic evidence for B lymphoblastic leukemia/lymphoma. BM DNA Chimerism 90% donor, FISH/CG pending.   - VNTR 94% CD33 lineage, 84% CD3 lineage (day 30 PB)     2.  HEME:   - Pt/donor both Opos  - Transfuse for hgb <7g/dL; plt < 10k.  No transfusions needs  - Recent pulmonary emboli: He developed a pulmonary emboli in the setting of receiving PEG asparaginase. On xarelto     3.  FEN/renal:   - hypomag on tac. Mag today=1.8, continue 6 tabs/day  - He has a history of renal cancer and resection. has a single kidney.  - Creatinine up to 1.6--stable - drinking almost 2 L today.  Running tac a little low: 6.7--level pending from today     4.    GVHD:   - Faint rash on back and chest. Skin bx 8/30 = interface dermatitis suggestive of GVHD. Using topical TMC and improving  - On tac 3mg BID, level next visit  - MMF through D+30  stopped 9/10    5.  ID: afebrile.  No active infections  - prophy acv/letrmovir, fluconazole,  - sulfa allergic; pentamidine at day +28- inhaled pentamidine 9/7   - CMV negative 9/4.      6. Hx of graves disease; On synthroid     7. GI:   - Dyspepsia, instructed to take PPI 30 min before pills or breakfast was taking after am coffee, cont oral mag for now. May be MMF vs gvh vs other.      8. CV: Hypertension. Likely secondary to tacrolimus. BP in normal range, cont on norvasc 7.5mg     RTC   Continue tac 3mg PO BID,  level pending  Continue PO Mg to 400mg, 6 tabs/day  10/8 w/ Dr. Hill (primary; as DOM)--add pentam IH to this day    Flu shot at day ~+60      I spent 30 minutes in the care of this patient today, which included time necessary for preparation for the visit, obtaining history, ordering medications/tests/procedures as medically indicated, review of pertinent medical literature, counseling of the patient, communication of recommendations to the care team, and documentation time.       Lashanda Figueroa pa-c  [phone]

## 2021-10-05 DIAGNOSIS — C91.01 ACUTE LYMPHOBLASTIC LEUKEMIA (ALL) IN REMISSION (H): Primary | ICD-10-CM

## 2021-10-05 LAB — CULTURE HARVEST COMPLETE DATE: NORMAL

## 2021-10-06 LAB — CULTURE HARVEST COMPLETE DATE: NORMAL

## 2021-10-07 LAB
INTERPRETATION: NORMAL
INTERPRETATION: NORMAL
ISCN: NORMAL
METHODS: NORMAL

## 2021-10-08 ENCOUNTER — APPOINTMENT (OUTPATIENT)
Dept: LAB | Facility: CLINIC | Age: 61
End: 2021-10-08
Attending: INTERNAL MEDICINE
Payer: COMMERCIAL

## 2021-10-08 ENCOUNTER — OFFICE VISIT (OUTPATIENT)
Dept: TRANSPLANT | Facility: CLINIC | Age: 61
End: 2021-10-08
Attending: INTERNAL MEDICINE
Payer: COMMERCIAL

## 2021-10-08 VITALS
BODY MASS INDEX: 31.35 KG/M2 | OXYGEN SATURATION: 98 % | HEART RATE: 79 BPM | WEIGHT: 231.2 LBS | RESPIRATION RATE: 16 BRPM | DIASTOLIC BLOOD PRESSURE: 89 MMHG | TEMPERATURE: 98.1 F | SYSTOLIC BLOOD PRESSURE: 151 MMHG

## 2021-10-08 DIAGNOSIS — Z94.81 STATUS POST BONE MARROW TRANSPLANT (H): Primary | ICD-10-CM

## 2021-10-08 DIAGNOSIS — C91.01 ACUTE LYMPHOBLASTIC LEUKEMIA (ALL) IN REMISSION (H): ICD-10-CM

## 2021-10-08 LAB
ALBUMIN SERPL-MCNC: 3.7 G/DL (ref 3.4–5)
ALP SERPL-CCNC: 77 U/L (ref 40–150)
ALT SERPL W P-5'-P-CCNC: 17 U/L (ref 0–70)
ANION GAP SERPL CALCULATED.3IONS-SCNC: 3 MMOL/L (ref 3–14)
AST SERPL W P-5'-P-CCNC: 15 U/L (ref 0–45)
BASOPHILS # BLD AUTO: 0 10E3/UL (ref 0–0.2)
BASOPHILS NFR BLD AUTO: 1 %
BILIRUB SERPL-MCNC: 0.5 MG/DL (ref 0.2–1.3)
BUN SERPL-MCNC: 42 MG/DL (ref 7–30)
CALCIUM SERPL-MCNC: 9.5 MG/DL (ref 8.5–10.1)
CHLORIDE BLD-SCNC: 109 MMOL/L (ref 94–109)
CO2 SERPL-SCNC: 25 MMOL/L (ref 20–32)
CREAT SERPL-MCNC: 1.65 MG/DL (ref 0.66–1.25)
EOSINOPHIL # BLD AUTO: 0.3 10E3/UL (ref 0–0.7)
EOSINOPHIL NFR BLD AUTO: 5 %
ERYTHROCYTE [DISTWIDTH] IN BLOOD BY AUTOMATED COUNT: 14.8 % (ref 10–15)
GFR SERPL CREATININE-BSD FRML MDRD: 44 ML/MIN/1.73M2
GLUCOSE BLD-MCNC: 113 MG/DL (ref 70–99)
HCT VFR BLD AUTO: 28.9 % (ref 40–53)
HGB BLD-MCNC: 9.6 G/DL (ref 13.3–17.7)
IMM GRANULOCYTES # BLD: 0 10E3/UL
IMM GRANULOCYTES NFR BLD: 1 %
LYMPHOCYTES # BLD AUTO: 1.1 10E3/UL (ref 0.8–5.3)
LYMPHOCYTES NFR BLD AUTO: 17 %
MAGNESIUM SERPL-MCNC: 1.8 MG/DL (ref 1.6–2.3)
MCH RBC QN AUTO: 32.4 PG (ref 26.5–33)
MCHC RBC AUTO-ENTMCNC: 33.2 G/DL (ref 31.5–36.5)
MCV RBC AUTO: 98 FL (ref 78–100)
MONOCYTES # BLD AUTO: 1.1 10E3/UL (ref 0–1.3)
MONOCYTES NFR BLD AUTO: 17 %
NEUTROPHILS # BLD AUTO: 3.8 10E3/UL (ref 1.6–8.3)
NEUTROPHILS NFR BLD AUTO: 59 %
NRBC # BLD AUTO: 0 10E3/UL
NRBC BLD AUTO-RTO: 0 /100
PLATELET # BLD AUTO: 202 10E3/UL (ref 150–450)
POTASSIUM BLD-SCNC: 4.4 MMOL/L (ref 3.4–5.3)
PROT SERPL-MCNC: 7.3 G/DL (ref 6.8–8.8)
RBC # BLD AUTO: 2.96 10E6/UL (ref 4.4–5.9)
SODIUM SERPL-SCNC: 137 MMOL/L (ref 133–144)
TACROLIMUS BLD-MCNC: 4.6 UG/L (ref 5–15)
TME LAST DOSE: ABNORMAL H
TME LAST DOSE: ABNORMAL H
WBC # BLD AUTO: 6.3 10E3/UL (ref 4–11)

## 2021-10-08 PROCEDURE — 80197 ASSAY OF TACROLIMUS: CPT

## 2021-10-08 PROCEDURE — 94640 AIRWAY INHALATION TREATMENT: CPT | Performed by: INTERNAL MEDICINE

## 2021-10-08 PROCEDURE — 250N000011 HC RX IP 250 OP 636: Performed by: INTERNAL MEDICINE

## 2021-10-08 PROCEDURE — 85004 AUTOMATED DIFF WBC COUNT: CPT

## 2021-10-08 PROCEDURE — 83735 ASSAY OF MAGNESIUM: CPT

## 2021-10-08 PROCEDURE — 82040 ASSAY OF SERUM ALBUMIN: CPT

## 2021-10-08 PROCEDURE — 36591 DRAW BLOOD OFF VENOUS DEVICE: CPT

## 2021-10-08 PROCEDURE — 94642 AEROSOL INHALATION TREATMENT: CPT | Performed by: INTERNAL MEDICINE

## 2021-10-08 PROCEDURE — G0463 HOSPITAL OUTPT CLINIC VISIT: HCPCS

## 2021-10-08 RX ORDER — HEPARIN SODIUM,PORCINE 10 UNIT/ML
5 VIAL (ML) INTRAVENOUS
Status: CANCELLED | OUTPATIENT
Start: 2021-11-07

## 2021-10-08 RX ORDER — MAGNESIUM OXIDE 400 MG/1
TABLET ORAL
Qty: 120 TABLET | Refills: 4 | Status: SHIPPED | OUTPATIENT
Start: 2021-10-08 | End: 2021-10-26

## 2021-10-08 RX ORDER — HEPARIN SODIUM (PORCINE) LOCK FLUSH IV SOLN 100 UNIT/ML 100 UNIT/ML
5 SOLUTION INTRAVENOUS
Status: CANCELLED | OUTPATIENT
Start: 2021-11-07

## 2021-10-08 RX ORDER — CYCLOBENZAPRINE HCL 5 MG
5 TABLET ORAL 3 TIMES DAILY PRN
Qty: 20 TABLET | Refills: 0 | Status: SHIPPED | OUTPATIENT
Start: 2021-10-08 | End: 2021-10-12

## 2021-10-08 RX ORDER — HEPARIN SODIUM (PORCINE) LOCK FLUSH IV SOLN 100 UNIT/ML 100 UNIT/ML
5 SOLUTION INTRAVENOUS ONCE
Status: COMPLETED | OUTPATIENT
Start: 2021-10-08 | End: 2021-10-08

## 2021-10-08 RX ORDER — PENTAMIDINE ISETHIONATE 300 MG/300MG
300 INHALANT RESPIRATORY (INHALATION)
Status: DISCONTINUED | OUTPATIENT
Start: 2021-10-08 | End: 2021-10-08 | Stop reason: HOSPADM

## 2021-10-08 RX ORDER — ALBUTEROL SULFATE 5 MG/ML
2.5 SOLUTION RESPIRATORY (INHALATION) EVERY 6 HOURS PRN
Status: CANCELLED
Start: 2021-11-07

## 2021-10-08 RX ORDER — TIZANIDINE 2 MG/1
2 TABLET ORAL 3 TIMES DAILY
COMMUNITY
End: 2021-10-19

## 2021-10-08 RX ORDER — ALBUTEROL SULFATE 5 MG/ML
2.5 SOLUTION RESPIRATORY (INHALATION) EVERY 6 HOURS PRN
Status: DISCONTINUED | OUTPATIENT
Start: 2021-10-08 | End: 2021-10-08 | Stop reason: HOSPADM

## 2021-10-08 RX ORDER — PENTAMIDINE ISETHIONATE 300 MG/300MG
300 INHALANT RESPIRATORY (INHALATION)
Status: CANCELLED
Start: 2021-11-07

## 2021-10-08 RX ADMIN — PENTAMIDINE ISETHIONATE 300 MG: 300 INHALANT RESPIRATORY (INHALATION) at 13:28

## 2021-10-08 RX ADMIN — Medication 5 ML: at 11:56

## 2021-10-08 ASSESSMENT — PAIN SCALES - GENERAL: PAINLEVEL: MILD PAIN (3)

## 2021-10-08 NOTE — PROGRESS NOTES
Albuterol neb 2.5 mg albuterol/NS given pre-treatment via hand held neb prior to pentamidine neb 300 mg NebuPent/6 ml sterile water via filtered neb. Pre-treatment O2 sats 98% HR 72 RR 12. Post-treatment O2 sats 98% HR 85. No adverse effects noted.

## 2021-10-08 NOTE — LETTER
10/8/2021         RE: Nik Nava  [id number] [facility] [id number]        Dear Colleague,    Thank you for referring your patient, Nik Nava, to the [facility] BLOOD AND MARROW TRANSPLANT PROGRAM [place]. Please see a copy of my visit note below.    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  He is doing well, eating drinking ok. No diarrhea. Face is a little erythematous. Some tremors. No other complaints    Review of Systems                                                                                                                                         10 point Review of systems was negative other than noted above.      PHYSICAL EXAM                                                                                                                                                 KPS: 80  There were no vitals taken for this visit.    General: NAD   HEENT: sclera anicteric. OP clear  Lungs: CTAB  Heart: RRR  Abd:  + BS. Soft. No tenderness with palpation  Skin: no rash currently  Neuro: A&O, speech normal  EXT:  trace LE edema  Vascular Access: port in the right chest     KPS=90  Current aGVHD staging:  Skin 0, UGI 0, LGI 0, Liver 0 (9/7/2021)    Labs:    Lab Results   Component Value Date    WBC 6.3 10/08/2021    WBC 8.9 07/08/2021     Lab Results   Component Value Date    RBC 2.96 10/08/2021    RBC 2.53 07/08/2021     Lab Results    Component Value Date    HGB 9.6 10/08/2021    HGB 8.5 07/08/2021     Lab Results   Component Value Date    HCT 28.9 10/08/2021    HCT 25.9 07/08/2021     Lab Results   Component Value Date    MCV 98 10/08/2021     07/08/2021     Lab Results   Component Value Date    MCH 32.4 10/08/2021    MCH 33.6 07/08/2021     Lab Results   Component Value Date    MCHC 33.2 10/08/2021    MCHC 32.8 07/08/2021     Lab Results   Component Value Date    RDW 14.8 10/08/2021    RDW 19.6 07/08/2021     Lab Results   Component Value Date     10/08/2021     07/08/2021     Last Comprehensive Metabolic Panel:  Sodium   Date Value Ref Range Status   10/08/2021 137 133 - 144 mmol/L Final   07/08/2021 139 133 - 144 mmol/L Final     Potassium   Date Value Ref Range Status   10/08/2021 4.4 3.4 - 5.3 mmol/L Final   07/08/2021 3.9 3.4 - 5.3 mmol/L Final     Chloride   Date Value Ref Range Status   10/08/2021 109 94 - 109 mmol/L Final   07/08/2021 108 94 - 109 mmol/L Final     Carbon Dioxide   Date Value Ref Range Status   07/08/2021 23 20 - 32 mmol/L Final     Carbon Dioxide (CO2)   Date Value Ref Range Status   10/08/2021 25 20 - 32 mmol/L Final     Anion Gap   Date Value Ref Range Status   10/08/2021 3 3 - 14 mmol/L Final   07/08/2021 7 3 - 14 mmol/L Final     Glucose   Date Value Ref Range Status   10/08/2021 113 (H) 70 - 99 mg/dL Final   07/08/2021 107 (H) 70 - 99 mg/dL Final     Urea Nitrogen   Date Value Ref Range Status   10/08/2021 42 (H) 7 - 30 mg/dL Final   07/08/2021 24 7 - 30 mg/dL Final     Creatinine   Date Value Ref Range Status   10/08/2021 1.65 (H) 0.66 - 1.25 mg/dL Final   07/08/2021 0.94 0.66 - 1.25 mg/dL Final     GFR Estimate   Date Value Ref Range Status   10/08/2021 44 (L) >60 mL/min/1.73m2 Final     Comment:     As of July 11, 2021, eGFR is calculated by the CKD-EPI creatinine equation, without race adjustment. eGFR can be influenced by muscle mass, exercise, and diet. The reported eGFR is an  estimation only and is only applicable if the renal function is stable.   07/08/2021 86 >60 mL/min/[1.73_m2] Final     Comment:     Non  GFR Calc  Starting 12/18/2018, serum creatinine based estimated GFR (eGFR) will be   calculated using the Chronic Kidney Disease Epidemiology Collaboration   (CKD-EPI) equation.       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  Acute lymphoblastic leukemia, [place] chromosome positive in CR, MRD neg.  Here for flu/cy/TBI and sib allo sct  HCT-CI score: 3 (prior solid tumor)  - Off G-CSF   - 9/2/2021 day +21 BMB: Slightly hypocellular marrow (cellularity estimated as 20 to 30%) with trilineage hematopoiesis, less than 1% blasts. No morphologic or immunophenotypic evidence for B lymphoblastic leukemia/lymphoma. BM DNA Chimerism 90% donor, FISH/CG pending.   - VNTR 94% CD33 lineage, 84% CD3 lineage (day 30 PB)     2.  HEME:   - Pt/donor both Opos  - Transfuse for hgb <7g/dL; plt < 10k.  No transfusions needs  - Recent pulmonary emboli: He developed a pulmonary emboli in the setting of receiving PEG asparaginase. On xarelto     3.  FEN/renal:   - hypomag on tac. Mag today=1.8, continue 6 tabs/day  - He has a history of renal cancer and resection. has a single kidney.  - Creatinine stable. Tac level pending from today   4.    GVHD:   - Faint rash on back and chest. Skin bx 8/30 = interface dermatitis suggestive of GVHD. Using topical TMC and improving  - On tac 3mg BID, level next visit  - MMF through D+30  stopped 9/10    5.  ID: afebrile.  No active infections  - prophy acv/letrmovir, fluconazole,  - sulfa allergic; pentamidine at day +28- inhaled  pentamidine today 10/8   - CMV negative 9/4.      6. Hx of graves disease; On synthroid     7. GI:   - Dyspepsia, instructed to take PPI 30 min before pills or breakfast was taking after am coffee, cont oral mag for now. May be MMF vs gvh vs other.      8. CV: Hypertension. Likely secondary to tacrolimus. BP in normal range, cont on norvasc 7.5mg     RTC   Tuesday for labs and provider visit.     Continue tac 3mg PO BID, level pending  Continue PO Mg to 400mg, 6 tabs/day        I spent 30 minutes in the care of this patient today, which included time necessary for preparation for the visit, obtaining history, ordering medications/tests/procedures as medically indicated, review of pertinent medical literature, counseling of the patient, communication of recommendations to the care team, and documentation time.     Carolyn Ramos        Again, thank you for allowing me to participate in the care of your patient.        Sincerely,        BMT DOM

## 2021-10-08 NOTE — NURSING NOTE
Chief Complaint   Patient presents with     Port Draw     Labs drawn via port by RN. VS taken.     Port accessed with 20 gauge 3/4 in power needle and labs drawn by rn.  Port flushed with NS and heparin. De-accessed following draw.  Pt tolerated well.  VS taken.  Pt checked in for next appt.    Cornelio White RN

## 2021-10-08 NOTE — NURSING NOTE
"Oncology Rooming Note    October 8, 2021 12:29 PM   Nik Nava is a 61 year old male who presents for:    Chief Complaint   Patient presents with     Port Draw     Labs drawn via port by RN. VS taken.     Oncology Clinic Visit     [facility] RETURN - ALL     Initial Vitals: BP (!) 151/89   Pulse 79   Temp 98.1  F (36.7  C) (Oral)   Resp 16   Wt 104.9 kg (231 lb 3.2 oz)   SpO2 98%   BMI 31.35 kg/m   Estimated body mass index is 31.35 kg/m  as calculated from the following:    Height as of 9/17/21: 1.829 m (6' 0.01\").    Weight as of this encounter: 104.9 kg (231 lb 3.2 oz). Body surface area is 2.31 meters squared.  Mild Pain (3) Comment: Data Unavailable   No LMP for male patient.  Allergies reviewed: Yes  Medications reviewed: Yes    Medications: Medication refills not needed today.  Pharmacy name entered into [employer]:    [facility] - [place], [place] - [address] [facility] [place], [place] - [phone]    Clinical concerns: No new concerns. Provider was notified.      Kelvin Gomez LPN            "

## 2021-10-08 NOTE — PROGRESS NOTES
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  He is doing well, eating drinking ok. No diarrhea. Face is a little erythematous. Some tremors. No other complaints    Review of Systems                                                                                                                                         10 point Review of systems was negative other than noted above.      PHYSICAL EXAM                                                                                                                                                 KPS: 80  There were no vitals taken for this visit.    General: NAD   HEENT: sclera anicteric. OP clear  Lungs: CTAB  Heart: RRR  Abd:  + BS. Soft. No tenderness with palpation  Skin: no rash currently  Neuro: A&O, speech normal  EXT:  trace LE edema  Vascular Access: port in the right chest     KPS=90  Current aGVHD staging:  Skin 0, UGI 0, LGI 0, Liver 0 (9/7/2021)    Labs:    Lab Results   Component Value Date    WBC 6.3 10/08/2021    WBC 8.9 07/08/2021     Lab Results   Component Value Date    RBC 2.96 10/08/2021    RBC 2.53 07/08/2021     Lab Results   Component Value Date    HGB 9.6 10/08/2021    HGB 8.5 07/08/2021     Lab Results   Component Value Date    HCT 28.9 10/08/2021    HCT 25.9 07/08/2021     Lab Results   Component Value Date    MCV 98 10/08/2021     07/08/2021     Lab Results   Component Value Date    MCH 32.4  10/08/2021    MCH 33.6 07/08/2021     Lab Results   Component Value Date    MCHC 33.2 10/08/2021    MCHC 32.8 07/08/2021     Lab Results   Component Value Date    RDW 14.8 10/08/2021    RDW 19.6 07/08/2021     Lab Results   Component Value Date     10/08/2021     07/08/2021     Last Comprehensive Metabolic Panel:  Sodium   Date Value Ref Range Status   10/08/2021 137 133 - 144 mmol/L Final   07/08/2021 139 133 - 144 mmol/L Final     Potassium   Date Value Ref Range Status   10/08/2021 4.4 3.4 - 5.3 mmol/L Final   07/08/2021 3.9 3.4 - 5.3 mmol/L Final     Chloride   Date Value Ref Range Status   10/08/2021 109 94 - 109 mmol/L Final   07/08/2021 108 94 - 109 mmol/L Final     Carbon Dioxide   Date Value Ref Range Status   07/08/2021 23 20 - 32 mmol/L Final     Carbon Dioxide (CO2)   Date Value Ref Range Status   10/08/2021 25 20 - 32 mmol/L Final     Anion Gap   Date Value Ref Range Status   10/08/2021 3 3 - 14 mmol/L Final   07/08/2021 7 3 - 14 mmol/L Final     Glucose   Date Value Ref Range Status   10/08/2021 113 (H) 70 - 99 mg/dL Final   07/08/2021 107 (H) 70 - 99 mg/dL Final     Urea Nitrogen   Date Value Ref Range Status   10/08/2021 42 (H) 7 - 30 mg/dL Final   07/08/2021 24 7 - 30 mg/dL Final     Creatinine   Date Value Ref Range Status   10/08/2021 1.65 (H) 0.66 - 1.25 mg/dL Final   07/08/2021 0.94 0.66 - 1.25 mg/dL Final     GFR Estimate   Date Value Ref Range Status   10/08/2021 44 (L) >60 mL/min/1.73m2 Final     Comment:     As of July 11, 2021, eGFR is calculated by the CKD-EPI creatinine equation, without race adjustment. eGFR can be influenced by muscle mass, exercise, and diet. The reported eGFR is an estimation only and is only applicable if the renal function is stable.   07/08/2021 86 >60 mL/min/[1.73_m2] Final     Comment:     Non  GFR Calc  Starting 12/18/2018, serum creatinine based estimated GFR (eGFR) will be   calculated using the Chronic Kidney Disease Epidemiology  Collaboration   (CKD-EPI) equation.       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  Acute lymphoblastic leukemia, [place] chromosome positive in CR, MRD neg.  Here for flu/cy/TBI and sib allo sct  HCT-CI score: 3 (prior solid tumor)  - Off G-CSF   - 9/2/2021 day +21 BMB: Slightly hypocellular marrow (cellularity estimated as 20 to 30%) with trilineage hematopoiesis, less than 1% blasts. No morphologic or immunophenotypic evidence for B lymphoblastic leukemia/lymphoma. BM DNA Chimerism 90% donor, FISH/CG pending.   - VNTR 94% CD33 lineage, 84% CD3 lineage (day 30 PB)     2.  HEME:   - Pt/donor both Opos  - Transfuse for hgb <7g/dL; plt < 10k.  No transfusions needs  - Recent pulmonary emboli: He developed a pulmonary emboli in the setting of receiving PEG asparaginase. On xarelto     3.  FEN/renal:   - hypomag on tac. Mag today=1.8, continue 6 tabs/day  - He has a history of renal cancer and resection. has a single kidney.  - Creatinine stable. Tac level pending from today   4.    GVHD:   - Faint rash on back and chest. Skin bx 8/30 = interface dermatitis suggestive of GVHD. Using topical TMC and improving  - On tac 3mg BID, level next visit  - MMF through D+30  stopped 9/10    5.  ID: afebrile.  No active infections  - prophy acv/letrmovir, fluconazole,  - sulfa allergic; pentamidine at day +28- inhaled pentamidine today 10/8   - CMV negative 9/4.      6. Hx of graves disease; On synthroid     7. GI:   - Dyspepsia, instructed to take PPI 30 min before pills or breakfast was taking after am coffee, cont oral mag for now. May be MMF vs gvh vs other.      8. CV: Hypertension. Likely secondary to  tacrolimus. BP in normal range, cont on norvasc 7.5mg     RTC   Tuesday for labs and provider visit.     Continue tac 3mg PO BID, level pending  Continue PO Mg to 400mg, 6 tabs/day        I spent 30 minutes in the care of this patient today, which included time necessary for preparation for the visit, obtaining history, ordering medications/tests/procedures as medically indicated, review of pertinent medical literature, counseling of the patient, communication of recommendations to the care team, and documentation time.     Carolyn Ramos

## 2021-10-09 LAB — CMV DNA SPEC NAA+PROBE-ACNC: NOT DETECTED IU/ML

## 2021-10-12 ENCOUNTER — OFFICE VISIT (OUTPATIENT)
Dept: TRANSPLANT | Facility: CLINIC | Age: 61
End: 2021-10-12
Attending: PHYSICIAN ASSISTANT
Payer: COMMERCIAL

## 2021-10-12 ENCOUNTER — APPOINTMENT (OUTPATIENT)
Dept: LAB | Facility: CLINIC | Age: 61
End: 2021-10-12
Attending: PHYSICIAN ASSISTANT
Payer: COMMERCIAL

## 2021-10-12 VITALS
BODY MASS INDEX: 31.67 KG/M2 | TEMPERATURE: 98.1 F | HEART RATE: 91 BPM | SYSTOLIC BLOOD PRESSURE: 141 MMHG | RESPIRATION RATE: 16 BRPM | OXYGEN SATURATION: 99 % | WEIGHT: 233.6 LBS | DIASTOLIC BLOOD PRESSURE: 89 MMHG

## 2021-10-12 DIAGNOSIS — C91.01 ACUTE LYMPHOBLASTIC LEUKEMIA (ALL) IN REMISSION (H): Primary | ICD-10-CM

## 2021-10-12 LAB
ALBUMIN SERPL-MCNC: 3.6 G/DL (ref 3.4–5)
ALP SERPL-CCNC: 71 U/L (ref 40–150)
ALT SERPL W P-5'-P-CCNC: 18 U/L (ref 0–70)
ANION GAP SERPL CALCULATED.3IONS-SCNC: 8 MMOL/L (ref 3–14)
AST SERPL W P-5'-P-CCNC: 16 U/L (ref 0–45)
BASOPHILS # BLD AUTO: 0 10E3/UL (ref 0–0.2)
BASOPHILS NFR BLD AUTO: 1 %
BILIRUB SERPL-MCNC: 0.4 MG/DL (ref 0.2–1.3)
BUN SERPL-MCNC: 37 MG/DL (ref 7–30)
CALCIUM SERPL-MCNC: 9.9 MG/DL (ref 8.5–10.1)
CHLORIDE BLD-SCNC: 105 MMOL/L (ref 94–109)
CO2 SERPL-SCNC: 25 MMOL/L (ref 20–32)
CREAT SERPL-MCNC: 1.89 MG/DL (ref 0.66–1.25)
EOSINOPHIL # BLD AUTO: 0.3 10E3/UL (ref 0–0.7)
EOSINOPHIL NFR BLD AUTO: 4 %
ERYTHROCYTE [DISTWIDTH] IN BLOOD BY AUTOMATED COUNT: 14.4 % (ref 10–15)
GFR SERPL CREATININE-BSD FRML MDRD: 37 ML/MIN/1.73M2
GLUCOSE BLD-MCNC: 109 MG/DL (ref 70–99)
HCT VFR BLD AUTO: 29.2 % (ref 40–53)
HGB BLD-MCNC: 9.7 G/DL (ref 13.3–17.7)
IMM GRANULOCYTES # BLD: 0 10E3/UL
IMM GRANULOCYTES NFR BLD: 1 %
LAB DIRECTOR DISCLAIMER: NORMAL
LAB DIRECTOR DISCLAIMER: NORMAL
LAB DIRECTOR INTERPRETATION: NORMAL
LAB DIRECTOR INTERPRETATION: NORMAL
LAB DIRECTOR METHODOLOGY: NORMAL
LAB DIRECTOR METHODOLOGY: NORMAL
LAB DIRECTOR RESULTS: NORMAL
LAB DIRECTOR RESULTS: NORMAL
LYMPHOCYTES # BLD AUTO: 1.1 10E3/UL (ref 0.8–5.3)
LYMPHOCYTES NFR BLD AUTO: 14 %
MAGNESIUM SERPL-MCNC: 1.8 MG/DL (ref 1.6–2.3)
MCH RBC QN AUTO: 32.4 PG (ref 26.5–33)
MCHC RBC AUTO-ENTMCNC: 33.2 G/DL (ref 31.5–36.5)
MCV RBC AUTO: 98 FL (ref 78–100)
MONOCYTES # BLD AUTO: 0.9 10E3/UL (ref 0–1.3)
MONOCYTES NFR BLD AUTO: 12 %
NEUTROPHILS # BLD AUTO: 5.3 10E3/UL (ref 1.6–8.3)
NEUTROPHILS NFR BLD AUTO: 68 %
NRBC # BLD AUTO: 0 10E3/UL
NRBC BLD AUTO-RTO: 0 /100
PLATELET # BLD AUTO: 199 10E3/UL (ref 150–450)
POTASSIUM BLD-SCNC: 4.6 MMOL/L (ref 3.4–5.3)
PROT SERPL-MCNC: 7.3 G/DL (ref 6.8–8.8)
RBC # BLD AUTO: 2.99 10E6/UL (ref 4.4–5.9)
SODIUM SERPL-SCNC: 138 MMOL/L (ref 133–144)
SPECIMEN DESCRIPTION: NORMAL
SPECIMEN DESCRIPTION: NORMAL
WBC # BLD AUTO: 7.7 10E3/UL (ref 4–11)

## 2021-10-12 PROCEDURE — 83735 ASSAY OF MAGNESIUM: CPT

## 2021-10-12 PROCEDURE — 82040 ASSAY OF SERUM ALBUMIN: CPT | Performed by: PHYSICIAN ASSISTANT

## 2021-10-12 PROCEDURE — 81268 CHIMERISM ANAL W/CELL SELECT: CPT | Performed by: PHYSICIAN ASSISTANT

## 2021-10-12 PROCEDURE — 90682 RIV4 VACC RECOMBINANT DNA IM: CPT | Performed by: PHYSICIAN ASSISTANT

## 2021-10-12 PROCEDURE — 99214 OFFICE O/P EST MOD 30 MIN: CPT

## 2021-10-12 PROCEDURE — 250N000011 HC RX IP 250 OP 636: Performed by: PHYSICIAN ASSISTANT

## 2021-10-12 PROCEDURE — G0452 MOLECULAR PATHOLOGY INTERPR: HCPCS | Performed by: PATHOLOGY

## 2021-10-12 PROCEDURE — 85025 COMPLETE CBC W/AUTO DIFF WBC: CPT | Performed by: PHYSICIAN ASSISTANT

## 2021-10-12 PROCEDURE — 36591 DRAW BLOOD OFF VENOUS DEVICE: CPT | Performed by: PHYSICIAN ASSISTANT

## 2021-10-12 PROCEDURE — G0008 ADMIN INFLUENZA VIRUS VAC: HCPCS | Performed by: PHYSICIAN ASSISTANT

## 2021-10-12 PROCEDURE — G0463 HOSPITAL OUTPT CLINIC VISIT: HCPCS | Mod: 25

## 2021-10-12 RX ORDER — AMLODIPINE BESYLATE 5 MG/1
TABLET ORAL
COMMUNITY
Start: 2021-10-12 | End: 2021-10-14

## 2021-10-12 RX ORDER — TACROLIMUS 1 MG/1
2 CAPSULE ORAL 2 TIMES DAILY
Qty: 120 CAPSULE | Refills: 0 | Status: SHIPPED | OUTPATIENT
Start: 2021-10-12 | End: 2021-11-30

## 2021-10-12 RX ORDER — HEPARIN SODIUM (PORCINE) LOCK FLUSH IV SOLN 100 UNIT/ML 100 UNIT/ML
5 SOLUTION INTRAVENOUS ONCE
Status: COMPLETED | OUTPATIENT
Start: 2021-10-12 | End: 2021-10-12

## 2021-10-12 RX ADMIN — INFLUENZA A VIRUS A/WISCONSIN/588/2019 (H1N1) RECOMBINANT HEMAGGLUTININ ANTIGEN, INFLUENZA A VIRUS A/TASMANIA/503/2020 (H3N2) RECOMBINANT HEMAGGLUTININ ANTIGEN, INFLUENZA B VIRUS B/WASHINGTON/02/2019 RECOMBINANT HEMAGGLUTININ ANTIGEN, AND INFLUENZA B VIRUS B/PHUKET/3073/2013 RECOMBINANT HEMAGGLUTININ ANTIGEN 0.5 ML: 45; 45; 45; 45 INJECTION INTRAMUSCULAR at 10:42

## 2021-10-12 RX ADMIN — Medication 5 ML: at 09:09

## 2021-10-12 ASSESSMENT — PAIN SCALES - GENERAL: PAINLEVEL: NO PAIN (0)

## 2021-10-12 NOTE — PROGRESS NOTES
BMT Clinic Note    ID:  Nik Nava is a 60 yo man D+63 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 Was in ED 9/12 and given Levaquin for slt edema L on sinus CT. No new topicals. No recent med changes. Still with mild fluctuating rash on mid/upper back and upper chest. Using TMC cream prn. Otherwise feels fairly well. Appetite is good and he denies n/v/d/c. No fevers or URI sx. Denies bleeding. Some tremors he thinks from tacro-stable.    Review of Systems                                                                                                                                         10 point Review of systems was negative other than noted above.      PHYSICAL EXAM                                                                                                                                                 KPS: 80  BP (!) 141/89 (BP Location: Right arm, Patient Position: Sitting, Cuff Size: Adult Regular)   Pulse 91   Temp 98.1  F (36.7  C) (Oral)   Resp 16   Wt 106 kg (233 lb 9.6 oz)   SpO2 99%   BMI 31.67 kg/m      General: NAD , facial erythema and mildly puffy around eyes (per pt/wife; my first time seeing him)  HEENT: sclera anicteric and non-injected. OP moist without lesions  Lungs: CTAB  Heart: RRR  Abd:  + BS. Soft. No tenderness with palpation  Skin: faint pinkish lacy erythema, mildly raised upper back; facial erythema  Neuro: A&O, speech normal  EXT:  trace LE  edema  Vascular Access: port in the right chest     KPS=90  Current aGVHD staging:  Skin 1, UGI 0, LGI 0, Liver 0 (10/12/2021)    Labs:  Lab Results   Component Value Date    WBC 7.7 10/12/2021    ANEU 1.9 09/07/2021    HGB 9.7 (L) 10/12/2021    HCT 29.2 (L) 10/12/2021     10/12/2021     10/12/2021    POTASSIUM 4.6 10/12/2021    CHLORIDE 105 10/12/2021    CO2 25 10/12/2021     (H) 10/12/2021    BUN 37 (H) 10/12/2021    CR 1.89 (H) 10/12/2021    MAG 1.8 10/12/2021    INR 0.97 09/02/2021    BILITOTAL 0.4 10/12/2021    AST 16 10/12/2021    ALT 18 10/12/2021    ALKPHOS 71 10/12/2021    PROTTOTAL 7.3 10/12/2021    ALBUMIN 3.6 10/12/2021         ASSESSMENT AND PLAN   Nik Nava is a 61 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  Acute lymphoblastic leukemia, [place] chromosome positive in CR, MRD neg.  Here for flu/cy/TBI and sib allo sct  HCT-CI score: 3 (prior solid tumor)  - Off G-CSF   - 9/2/2021 day +21 BMB: Slightly hypocellular marrow (cellularity estimated as 20 to 30%) with trilineage hematopoiesis, less than 1% blasts. No morphologic or immunophenotypic evidence for B lymphoblastic leukemia/lymphoma. BM DNA Chimerism 90% donor, FISH/CG pending.   - VNTR 94% CD33 lineage, 84% CD3 lineage (day 30 PB)     2.  HEME:   - Pt/donor both Opos  - Transfuse for hgb <7g/dL; plt < 10k.  No transfusions needs  - Recent pulmonary emboli: He developed a pulmonary emboli in the setting of receiving PEG asparaginase. On Xarelto     3.  FEN/Renal: Oral intake good/stable. Wt increased.  - hypomag on tac. Mag today=1.8, continue 6 tabs/day  - He has a history of renal cancer and resection. has a single kidney.  - Creatinine increased today at 1.89 (recently ~1.6). Last tac level 4.6 (10/8) but 16hr level so likely higher trough. No dose change. Did not know to  hold for level today. Cautiously dose-reduce tacro per below and repeat level Thurs. Push oral fluids.     4.    GVHD:   - Faint rash on back and chest, overall stable. Skin bx 8/30 = interface dermatitis suggestive of GVHD. Using topical TMC and improving. Facial erythema, unclear etiology. Rec TMC to body rash bid and hydrocort to face bid (10/12).  - On tac 3mg BID, level next visit  - MMF through D+30  stopped 9/10    5.  ID: afebrile.  No active infections  - prophy acv/letrmovir, fluconazole, Pentamidine (10/8)   - CMV negative 10/8.   - influenza vacc given 10/12     6. Hx of graves disease; On synthroid     7. GI: no complaints  - prophy omeprazole     8. CV: Hypertension. Likely secondary to tacrolimus. BP in normal range, cont on norvasc 7.5mg     Summary:  Decrease tacro to 2mg bid   Push oral fluids  Bid steroid cream to face (hydorcort) and body (TMC)  Trial holding Norvasc in case facial edema is related; monitor BP at home  RTC Thurs for labs (including tac level), fu    30 minutes spent on the date of the encounter doing chart review, review of test results, interpretation of tests, patient visit, documentation and discussion with other provider(s)     Renetta Wiggins PA-C  [phone]

## 2021-10-12 NOTE — NURSING NOTE
Chief Complaint   Patient presents with     Port Draw     Labs drawn via port by RN in lab. VS taken.      Port accessed with 20 gauge 3/4 inch flat needle and labs drawn by rn.  Port flushed with saline and heparin. Port then de-accessed.  Pt tolerated well.  VS taken.  Pt checked in for next appt.    Tamie Pruett RN

## 2021-10-12 NOTE — LETTER
10/12/2021         RE: Nik MCDONALD Elijah  [address] [facility] [id number]        Dear Colleague,    Thank you for referring your patient, Nik Nava, to the [facility] BLOOD AND MARROW TRANSPLANT PROGRAM [place]. Please see a copy of my visit note below.     Nik Nava   Home Medication Instructions ANIL:[id number]    Printed on:10/12/21 1026   Medication Information                      acetaminophen (TYLENOL) 500 MG tablet  Take 500-1,000 mg by mouth every 6 hours as needed for mild pain              acyclovir (ZOVIRAX) 800 MG tablet  Take 1 tablet (800 mg) by mouth 2 times daily             amLODIPine (NORVASC) 5 MG tablet  HOLD x10/12/21; monitor facial edema/redness. (previous dose 7.5mg/d)             fluconazole (DIFLUCAN) 200 MG tablet  Take 1 tablet (200 mg) by mouth daily             letermovir (PREVYMIS) 480 MG TABS tablet  Take 1 tablet (480 mg) by mouth daily Start on 8/24             levothyroxine (SYNTHROID/LEVOTHROID) 200 MCG tablet  Take 200 mcg by mouth daily              LORazepam (ATIVAN) 1 MG tablet  Take 1 mg by mouth nightly as needed for anxiety or sleep              magnesium oxide (MAG-OX) 400 MG tablet  Take 6 tablets daily             melatonin 5 MG CAPS  Take 5 mg by mouth At Bedtime              nicotine polacrilex (NICORETTE) 4 MG gum  Take 4 mg by mouth as needed for smoking cessation              Nutritional Supplements (ENSURE HIGH PROTEIN)  Take 1 Can by mouth 2 times daily              omeprazole (PRILOSEC) 40 MG DR capsule  Take 40 mg by mouth daily             ondansetron (ZOFRAN) 8 MG tablet  ondansetron HCl 8 mg tablet             oxyCODONE (ROXICODONE) 5 MG tablet               polyethylene glycol (MIRALAX) 17 GM/Dose powder  Take 17 g by mouth daily as needed for constipation              prochlorperazine (COMPAZINE) 10 MG tablet  Take 5 mg by mouth every 6 hours as needed for nausea or vomiting              senna (SENOKOT) 8.6 MG  tablet  Take 17.2 mg by mouth              tacrolimus (GENERIC EQUIVALENT) 0.5 MG capsule  Taking 3 mg by mouth twice daily so can hold the 0.5 mg tablets for now             tacrolimus (GENERIC EQUIVALENT) 1 MG capsule  Take 2 capsules (2 mg) by mouth 2 times daily             tiZANidine (ZANAFLEX) 2 MG tablet  Take 2 mg by mouth 3 times daily             triamcinolone (KENALOG) 0.1 % external cream  Apply topically 2 times daily             XARELTO ANTICOAGULANT 20 MG TABS tablet  Take 20 mg by mouth daily                10/12/21:   1) decrease tacrolimus to 2mg twice daily  2) ok to stop ursodiol  3) trial holding amlodipine and see if facial swelling/redness improves; monitor BP at home      BMT Clinic Note    ID:  Nik Nava is a 62 yo man D+63 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 Was in ED 9/12 and given Levaquin for slt edema L on sinus CT. No new topicals. No recent med changes. Still with mild fluctuating rash on mid/upper back and upper chest. Using TMC cream prn. Otherwise feels fairly well. Appetite is good and he denies n/v/d/c. No fevers or URI sx. Denies bleeding. Some tremors he thinks from tacro-stable.    Review of Systems                                                                                                                                         10 point Review of systems was negative other than noted above.      PHYSICAL EXAM                                                                                                                                                  KPS: 80  BP (!) 141/89 (BP Location: Right arm, Patient Position: Sitting, Cuff Size: Adult Regular)   Pulse 91   Temp 98.1  F (36.7  C) (Oral)   Resp 16   Wt 106 kg (233 lb 9.6 oz)   SpO2 99%   BMI 31.67 kg/m      General: NAD , facial erythema and mildly puffy around eyes (per pt/wife; my first time seeing him)  HEENT: sclera anicteric and non-injected. OP moist without lesions  Lungs: CTAB  Heart: RRR  Abd:  + BS. Soft. No tenderness with palpation  Skin: faint pinkish lacy erythema, mildly raised upper back; facial erythema  Neuro: A&O, speech normal  EXT:  trace LE edema  Vascular Access: port in the right chest     KPS=90  Current aGVHD staging:  Skin 1, UGI 0, LGI 0, Liver 0 (10/12/2021)    Labs:  Lab Results   Component Value Date    WBC 7.7 10/12/2021    ANEU 1.9 09/07/2021    HGB 9.7 (L) 10/12/2021    HCT 29.2 (L) 10/12/2021     10/12/2021     10/12/2021    POTASSIUM 4.6 10/12/2021    CHLORIDE 105 10/12/2021    CO2 25 10/12/2021     (H) 10/12/2021    BUN 37 (H) 10/12/2021    CR 1.89 (H) 10/12/2021    MAG 1.8 10/12/2021    INR 0.97 09/02/2021    BILITOTAL 0.4 10/12/2021    AST 16 10/12/2021    ALT 18 10/12/2021    ALKPHOS 71 10/12/2021    PROTTOTAL 7.3 10/12/2021    ALBUMIN 3.6 10/12/2021         ASSESSMENT AND PLAN   Nik Nava is a 61 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  Acute lymphoblastic leukemia, [place] chromosome positive in CR, MRD neg.  Here for flu/cy/TBI and sib allo sct  HCT-CI score: 3 (prior solid tumor)  - Off G-CSF   - 9/2/2021 day +21 BMB: Slightly hypocellular marrow (cellularity estimated as 20 to 30%) with trilineage hematopoiesis, less than 1% blasts. No morphologic or immunophenotypic  evidence for B lymphoblastic leukemia/lymphoma. BM DNA Chimerism 90% donor, FISH/CG pending.   - VNTR 94% CD33 lineage, 84% CD3 lineage (day 30 PB)     2.  HEME:   - Pt/donor both Opos  - Transfuse for hgb <7g/dL; plt < 10k.  No transfusions needs  - Recent pulmonary emboli: He developed a pulmonary emboli in the setting of receiving PEG asparaginase. On Xarelto     3.  FEN/Renal: Oral intake good/stable. Wt increased.  - hypomag on tac. Mag today=1.8, continue 6 tabs/day  - He has a history of renal cancer and resection. has a single kidney.  - Creatinine increased today at 1.89 (recently ~1.6). Last tac level 4.6 (10/8) but 16hr level so likely higher trough. No dose change. Did not know to hold for level today. Cautiously dose-reduce tacro per below and repeat level Thurs. Push oral fluids.     4.    GVHD:   - Faint rash on back and chest, overall stable. Skin bx 8/30 = interface dermatitis suggestive of GVHD. Using topical TMC and improving. Facial erythema, unclear etiology. Rec TMC to body rash bid and hydrocort to face bid (10/12).  - On tac 3mg BID, level next visit  - MMF through D+30  stopped 9/10    5.  ID: afebrile.  No active infections  - prophy acv/letrmovir, fluconazole, Pentamidine (10/8)   - CMV negative 10/8.   - influenza vacc given 10/12     6. Hx of graves disease; On synthroid     7. GI: no complaints  - prophy omeprazole     8. CV: Hypertension. Likely secondary to tacrolimus. BP in normal range, cont on norvasc 7.5mg     Summary:  Decrease tacro to 2mg bid   Push oral fluids  Bid steroid cream to face (hydorcort) and body (TMC)  Trial holding Norvasc in case facial edema is related; monitor BP at home  RTC Thurs for labs (including tac level), fu    30 minutes spent on the date of the encounter doing chart review, review of test results, interpretation of tests, patient visit, documentation and discussion with other provider(s)     JENA Grover-C  [phone]                Again, thank  you for allowing me to participate in the care of your patient.        Sincerely,        BMT Advanced Practice Provider

## 2021-10-12 NOTE — NURSING NOTE
"Oncology Rooming Note    October 12, 2021 9:45 AM   Nik Nava is a 61 year old male who presents for:    Chief Complaint   Patient presents with     Port Draw     Labs drawn via port by RN in lab. VS taken.      Oncology Clinic Visit     ALL in remission     Initial Vitals: BP (!) 141/89 (BP Location: Right arm, Patient Position: Sitting, Cuff Size: Adult Regular)   Pulse 91   Temp 98.1  F (36.7  C) (Oral)   Resp 16   Wt 106 kg (233 lb 9.6 oz)   SpO2 99%   BMI 31.67 kg/m   Estimated body mass index is 31.67 kg/m  as calculated from the following:    Height as of 9/17/21: 1.829 m (6' 0.01\").    Weight as of this encounter: 106 kg (233 lb 9.6 oz). Body surface area is 2.32 meters squared.  No Pain (0) Comment: Data Unavailable   No LMP for male patient.  Allergies reviewed: Yes  Medications reviewed: Yes    Medications: Medication refills not needed today.  Pharmacy name entered into [facility]:    [facility] - [place], [place] - [address] [facility] [place], [place] - [phone]    Clinical concerns: none       Yamilet Marley CMA            "

## 2021-10-12 NOTE — PROGRESS NOTES
Nik Nava   Home Medication Instructions ANIL:[id number]    Printed on:10/12/21 1036   Medication Information                      acetaminophen (TYLENOL) 500 MG tablet  Take 500-1,000 mg by mouth every 6 hours as needed for mild pain              acyclovir (ZOVIRAX) 800 MG tablet  Take 1 tablet (800 mg) by mouth 2 times daily             amLODIPine (NORVASC) 5 MG tablet  HOLD x10/12/21; monitor facial edema/redness. (previous dose 7.5mg/d)             fluconazole (DIFLUCAN) 200 MG tablet  Take 1 tablet (200 mg) by mouth daily             letermovir (PREVYMIS) 480 MG TABS tablet  Take 1 tablet (480 mg) by mouth daily Start on 8/24             levothyroxine (SYNTHROID/LEVOTHROID) 200 MCG tablet  Take 200 mcg by mouth daily              LORazepam (ATIVAN) 1 MG tablet  Take 1 mg by mouth nightly as needed for anxiety or sleep              magnesium oxide (MAG-OX) 400 MG tablet  Take 6 tablets daily             melatonin 5 MG CAPS  Take 5 mg by mouth At Bedtime              nicotine polacrilex (NICORETTE) 4 MG gum  Take 4 mg by mouth as needed for smoking cessation              Nutritional Supplements (ENSURE HIGH PROTEIN)  Take 1 Can by mouth 2 times daily              omeprazole (PRILOSEC) 40 MG DR capsule  Take 40 mg by mouth daily             ondansetron (ZOFRAN) 8 MG tablet  ondansetron HCl 8 mg tablet             oxyCODONE (ROXICODONE) 5 MG tablet               polyethylene glycol (MIRALAX) 17 GM/Dose powder  Take 17 g by mouth daily as needed for constipation              prochlorperazine (COMPAZINE) 10 MG tablet  Take 5 mg by mouth every 6 hours as needed for nausea or vomiting              senna (SENOKOT) 8.6 MG tablet  Take 17.2 mg by mouth              tacrolimus (GENERIC EQUIVALENT) 0.5 MG capsule  Taking 3 mg by mouth twice daily so can hold the 0.5 mg tablets for now             tacrolimus (GENERIC EQUIVALENT) 1 MG capsule  Take 2 capsules (2 mg) by mouth 2 times daily             tiZANidine  (ZANAFLEX) 2 MG tablet  Take 2 mg by mouth 3 times daily             triamcinolone (KENALOG) 0.1 % external cream  Apply topically 2 times daily             XARELTO ANTICOAGULANT 20 MG TABS tablet  Take 20 mg by mouth daily                10/12/21:   1) decrease tacrolimus to 2mg twice daily  2) ok to stop ursodiol  3) trial holding amlodipine and see if facial swelling/redness improves; monitor BP at home

## 2021-10-12 NOTE — NURSING NOTE
Influenza vaccine given to patient in Right Arm. Patient tolerated injection without any incidents.     Has the patient received the information for the injectable influenza vaccine? YES    ANASTACIA Reddy October 12, 2021 10:45 AM

## 2021-10-13 NOTE — PROGRESS NOTES
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  Reports \"itching\" to his legs after his wife used a different detergent, no real rash.  Otherwise feeling good.  Eating with no N/v/D.  Afebrile with no cough or SOB.  No pain or bleeding.       Review of Systems                                                                                                                                         8 point Review of systems was negative other than noted above.      PHYSICAL EXAM                                                                                                                                                 KPS: 80  /83   Pulse 86   Temp 98  F (36.7  C) (Oral)   Resp 16   Wt 104.3 kg (230 lb)   SpO2 98%   BMI 31.19 kg/m      General: NAD  HEENT: sclera anicteric and non-injected. OP moist without lesions  Lungs: CTAB  Heart: RRR  Abd:  + BS. Soft. No tenderness with palpation  Skin: Mild facial erythema.  Faint maculopapular rash to upper back   Neuro: A&O,  EXT:  trace LE edema  Vascular Access: port in the right chest     KPS=90  Current aGVHD staging:  Skin 1, UGI 0, LGI 0, Liver 0 (10/12/2021)    Labs:  Lab Results   Component Value Date    WBC 6.9 10/14/2021    ANEU 1.9 09/07/2021    HGB 9.7 (L) 10/14/2021    HCT 28.6 (L) 10/14/2021     10/14/2021     10/14/2021    POTASSIUM 4.5 10/14/2021    CHLORIDE 107 10/14/2021    CO2 25 10/14/2021     (H) 10/14/2021    BUN 36 (H) 10/14/2021    CR 1.76 (H) 10/14/2021    MAG 1.8 10/14/2021    INR 0.97 09/02/2021    BILITOTAL 0.3 10/14/2021    AST 16 10/14/2021    ALT 19 10/14/2021    ALKPHOS 73 10/14/2021    PROTTOTAL 7.3 10/14/2021    ALBUMIN 3.6 10/14/2021         ASSESSMENT AND PLAN   Nik Nava is a 61 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  Acute lymphoblastic leukemia, [place] chromosome positive in CR, MRD neg.  Here for flu/cy/TBI and sib allo sct  HCT-CI score: 3 (prior solid tumor)  - 9/2/2021 day +21 BMB: Slightly hypocellular marrow (cellularity estimated as 20 to 30%) with trilineage hematopoiesis, less than 1% blasts. No morphologic or immunophenotypic evidence for B lymphoblastic leukemia/lymphoma. BM DNA Chimerism 90% donor, FISH/CG pending.   - VNTR 94% CD33 lineage, 84% CD3 lineage (day 30 PB)     2.  HEME:   - Pt/donor both Opos  - Transfuse for hgb <7g/dL; plt < 10k.  No transfusions needs  -  pulmonary emboli: He developed a pulmonary emboli in the setting of receiving PEG asparaginase. On Xarelto     3.  FEN/Renal: Oral intake good/stable  - Tac induced hypomag, continue 6 tabs/day  - He has a history of renal cancer and resection. has a single kidney.  - Creatinine only slightly better, down to 1.7 despite Tac dose reduction. Repeat level pending today. If his level cant be kept close to 5 with a somewhat stable creat we may need to change to Sirolimus    4.    GVHD:   - Faint rash on back and chest, overall stable. Skin bx 8/30 = interface dermatitis suggestive of GVHD.Using topical steroids to body & face  - On tac 2mg BID, level pending.  May need to change to Sirolimus if level is <5, given renal dysfunction  - MMF through D+30  stopped 9/10    5.  ID: afebrile.  No active infections  - prophy acv/letrmovir, fluconazole, Pentamidine (10/8)   - CMV negative 10/8.   - influenza vacc given 10/12     6. Hx of graves disease; On synthroid     7. GI: no complaints  - prophy omeprazole     8. CV: Norvasc held due to facial edema & erythema.  It has improved & his BP is OK so will not resume Norvasc at this time.      RTC Thurs for labs (including tac level), fu    30 minutes spent on the date of the encounter doing chart review, review of test results,  interpretation of tests, patient visit, documentation and discussion with other provider(s)     Jailyn Ny

## 2021-10-14 ENCOUNTER — ONCOLOGY VISIT (OUTPATIENT)
Dept: TRANSPLANT | Facility: CLINIC | Age: 61
End: 2021-10-14
Attending: INTERNAL MEDICINE
Payer: COMMERCIAL

## 2021-10-14 ENCOUNTER — APPOINTMENT (OUTPATIENT)
Dept: LAB | Facility: CLINIC | Age: 61
End: 2021-10-14
Attending: INTERNAL MEDICINE
Payer: COMMERCIAL

## 2021-10-14 VITALS
RESPIRATION RATE: 16 BRPM | SYSTOLIC BLOOD PRESSURE: 129 MMHG | WEIGHT: 230 LBS | HEART RATE: 86 BPM | OXYGEN SATURATION: 98 % | DIASTOLIC BLOOD PRESSURE: 83 MMHG | TEMPERATURE: 98 F | BODY MASS INDEX: 31.19 KG/M2

## 2021-10-14 DIAGNOSIS — C91.01 ACUTE LYMPHOBLASTIC LEUKEMIA (ALL) IN REMISSION (H): ICD-10-CM

## 2021-10-14 LAB
ALBUMIN SERPL-MCNC: 3.6 G/DL (ref 3.4–5)
ALP SERPL-CCNC: 73 U/L (ref 40–150)
ALT SERPL W P-5'-P-CCNC: 19 U/L (ref 0–70)
ANION GAP SERPL CALCULATED.3IONS-SCNC: 6 MMOL/L (ref 3–14)
AST SERPL W P-5'-P-CCNC: 16 U/L (ref 0–45)
BASOPHILS # BLD AUTO: 0 10E3/UL (ref 0–0.2)
BASOPHILS NFR BLD AUTO: 0 %
BILIRUB SERPL-MCNC: 0.3 MG/DL (ref 0.2–1.3)
BUN SERPL-MCNC: 36 MG/DL (ref 7–30)
CALCIUM SERPL-MCNC: 9.6 MG/DL (ref 8.5–10.1)
CHLORIDE BLD-SCNC: 107 MMOL/L (ref 94–109)
CO2 SERPL-SCNC: 25 MMOL/L (ref 20–32)
CREAT SERPL-MCNC: 1.76 MG/DL (ref 0.66–1.25)
EOSINOPHIL # BLD AUTO: 0.3 10E3/UL (ref 0–0.7)
EOSINOPHIL NFR BLD AUTO: 5 %
ERYTHROCYTE [DISTWIDTH] IN BLOOD BY AUTOMATED COUNT: 14.4 % (ref 10–15)
GFR SERPL CREATININE-BSD FRML MDRD: 40 ML/MIN/1.73M2
GLUCOSE BLD-MCNC: 114 MG/DL (ref 70–99)
HCT VFR BLD AUTO: 28.6 % (ref 40–53)
HGB BLD-MCNC: 9.7 G/DL (ref 13.3–17.7)
IMM GRANULOCYTES # BLD: 0 10E3/UL
IMM GRANULOCYTES NFR BLD: 0 %
LYMPHOCYTES # BLD AUTO: 0.9 10E3/UL (ref 0.8–5.3)
LYMPHOCYTES NFR BLD AUTO: 12 %
MAGNESIUM SERPL-MCNC: 1.8 MG/DL (ref 1.6–2.3)
MCH RBC QN AUTO: 33.1 PG (ref 26.5–33)
MCHC RBC AUTO-ENTMCNC: 33.9 G/DL (ref 31.5–36.5)
MCV RBC AUTO: 98 FL (ref 78–100)
MONOCYTES # BLD AUTO: 0.9 10E3/UL (ref 0–1.3)
MONOCYTES NFR BLD AUTO: 13 %
NEUTROPHILS # BLD AUTO: 4.8 10E3/UL (ref 1.6–8.3)
NEUTROPHILS NFR BLD AUTO: 70 %
NRBC # BLD AUTO: 0 10E3/UL
NRBC BLD AUTO-RTO: 0 /100
PLATELET # BLD AUTO: 184 10E3/UL (ref 150–450)
POTASSIUM BLD-SCNC: 4.5 MMOL/L (ref 3.4–5.3)
PROT SERPL-MCNC: 7.3 G/DL (ref 6.8–8.8)
RBC # BLD AUTO: 2.93 10E6/UL (ref 4.4–5.9)
SODIUM SERPL-SCNC: 138 MMOL/L (ref 133–144)
TACROLIMUS BLD-MCNC: 4.6 UG/L (ref 5–15)
TME LAST DOSE: ABNORMAL H
TME LAST DOSE: ABNORMAL H
WBC # BLD AUTO: 6.9 10E3/UL (ref 4–11)

## 2021-10-14 PROCEDURE — 83735 ASSAY OF MAGNESIUM: CPT

## 2021-10-14 PROCEDURE — 250N000011 HC RX IP 250 OP 636: Performed by: INTERNAL MEDICINE

## 2021-10-14 PROCEDURE — 80197 ASSAY OF TACROLIMUS: CPT

## 2021-10-14 PROCEDURE — 99214 OFFICE O/P EST MOD 30 MIN: CPT

## 2021-10-14 PROCEDURE — 82040 ASSAY OF SERUM ALBUMIN: CPT

## 2021-10-14 PROCEDURE — 85004 AUTOMATED DIFF WBC COUNT: CPT

## 2021-10-14 PROCEDURE — G0463 HOSPITAL OUTPT CLINIC VISIT: HCPCS

## 2021-10-14 PROCEDURE — 36591 DRAW BLOOD OFF VENOUS DEVICE: CPT

## 2021-10-14 RX ORDER — HEPARIN SODIUM (PORCINE) LOCK FLUSH IV SOLN 100 UNIT/ML 100 UNIT/ML
5 SOLUTION INTRAVENOUS EVERY 8 HOURS PRN
Status: DISCONTINUED | OUTPATIENT
Start: 2021-10-14 | End: 2021-10-14 | Stop reason: HOSPADM

## 2021-10-14 RX ADMIN — Medication 5 ML: at 08:13

## 2021-10-14 ASSESSMENT — PAIN SCALES - GENERAL: PAINLEVEL: NO PAIN (0)

## 2021-10-14 NOTE — NURSING NOTE
Chief Complaint   Patient presents with     Port Draw     Labs drawn via PORT by RN in lab. VS lynda.      Winnie Corrales RN

## 2021-10-14 NOTE — LETTER
"    10/14/2021         RE: Nik Nava  [address] [facility] [address]        Dear Colleague,    Thank you for referring your patient, Nik Nava, to the [facility] BLOOD AND MARROW TRANSPLANT PROGRAM [place]. Please see a copy of my visit note below.    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  Reports \"itching\" to his legs after his wife used a different detergent, no real rash.  Otherwise feeling good.  Eating with no N/v/D.  Afebrile with no cough or SOB.  No pain or bleeding.       Review of Systems                                                                                                                                         8 point Review of systems was negative other than noted above.      PHYSICAL EXAM                                                                                                                                                 KPS: 80  /83   Pulse 86   Temp 98  F (36.7  C) (Oral)   Resp 16   Wt 104.3 kg (230 lb)   SpO2 98%   BMI 31.19 kg/m      General: NAD  HEENT: sclera anicteric and non-injected. OP moist without lesions  Lungs: CTAB  Heart: RRR  Abd:  + BS. Soft. No tenderness with palpation  Skin: Mild facial erythema.  Faint maculopapular rash to upper back   Neuro: A&O,  EXT:  trace LE edema  Vascular Access: port in the right chest     KPS=90  Current aGVHD staging:  Skin 1, UGI 0, LGI 0, Liver 0 (10/12/2021)    Labs:  Lab Results   Component Value Date    WBC 6.9 10/14/2021    ANEU 1.9 09/07/2021    HGB 9.7 (L) 10/14/2021    HCT 28.6 (L) 10/14/2021     10/14/2021     10/14/2021    POTASSIUM 4.5 10/14/2021    CHLORIDE 107 10/14/2021    CO2 25 10/14/2021     (H) 10/14/2021    BUN 36 (H) 10/14/2021    CR 1.76 (H) 10/14/2021    MAG 1.8 10/14/2021    INR 0.97 09/02/2021    BILITOTAL 0.3 10/14/2021    " AST 16 10/14/2021    ALT 19 10/14/2021    ALKPHOS 73 10/14/2021    PROTTOTAL 7.3 10/14/2021    ALBUMIN 3.6 10/14/2021         ASSESSMENT AND PLAN   Nik Nvaa is a 61 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, 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  Acute lymphoblastic leukemia, [place] chromosome positive in CR, MRD neg.  Here for flu/cy/TBI and sib allo sct  HCT-CI score: 3 (prior solid tumor)  - 9/2/2021 day +21 BMB: Slightly hypocellular marrow (cellularity estimated as 20 to 30%) with trilineage hematopoiesis, less than 1% blasts. No morphologic or immunophenotypic evidence for B lymphoblastic leukemia/lymphoma. BM DNA Chimerism 90% donor, FISH/CG pending.   - VNTR 94% CD33 lineage, 84% CD3 lineage (day 30 PB)     2.  HEME:   - Pt/donor both Opos  - Transfuse for hgb <7g/dL; plt < 10k.  No transfusions needs  -  pulmonary emboli: He developed a pulmonary emboli in the setting of receiving PEG asparaginase. On Xarelto     3.  FEN/Renal: Oral intake good/stable  - Tac induced hypomag, continue 6 tabs/day  - He has a history of renal cancer and resection. has a single kidney.  - Creatinine only slightly better, down to 1.7 despite Tac dose reduction. Repeat level pending today. If his level cant be kept close to 5 with a somewhat stable creat we may need to change to Sirolimus    4.    GVHD:   - Faint rash on back and chest, overall stable. Skin bx 8/30 = interface dermatitis suggestive of GVHD.Using topical steroids to body & face  - On tac 2mg BID, level pending.  May need to change to Sirolimus if level is <5, given renal dysfunction  - MMF through D+30  stopped 9/10    5.  ID: afebrile.  No active infections  - prophy acv/letrmovir, fluconazole, Pentamidine (10/8)   - CMV negative 10/8.   - influenza vacc given 10/12     6. Hx of graves disease; On synthroid     7. GI: no complaints  - prophy  omeprazole     8. CV: Norvasc held due to facial edema & erythema.  It has improved & his BP is OK so will not resume Norvasc at this time.      RTC Thurs for labs (including tac level), fu    30 minutes spent on the date of the encounter doing chart review, review of test results, interpretation of tests, patient visit, documentation and discussion with other provider(s)     Jailyn Ny            Again, thank you for allowing me to participate in the care of your patient.        Sincerely,        BMT Advanced Practice Provider

## 2021-10-15 DIAGNOSIS — Z11.59 ENCOUNTER FOR SCREENING FOR OTHER VIRAL DISEASES: ICD-10-CM

## 2021-10-15 LAB — CMV DNA SPEC NAA+PROBE-ACNC: NOT DETECTED IU/ML

## 2021-10-19 ENCOUNTER — APPOINTMENT (OUTPATIENT)
Dept: LAB | Facility: CLINIC | Age: 61
End: 2021-10-19
Attending: PHYSICIAN ASSISTANT
Payer: COMMERCIAL

## 2021-10-19 ENCOUNTER — OFFICE VISIT (OUTPATIENT)
Dept: TRANSPLANT | Facility: CLINIC | Age: 61
End: 2021-10-19
Attending: PHYSICIAN ASSISTANT
Payer: COMMERCIAL

## 2021-10-19 VITALS
RESPIRATION RATE: 16 BRPM | WEIGHT: 226.2 LBS | SYSTOLIC BLOOD PRESSURE: 128 MMHG | TEMPERATURE: 98.1 F | OXYGEN SATURATION: 99 % | BODY MASS INDEX: 30.67 KG/M2 | DIASTOLIC BLOOD PRESSURE: 82 MMHG | HEART RATE: 87 BPM

## 2021-10-19 DIAGNOSIS — C91.01 ACUTE LYMPHOBLASTIC LEUKEMIA (ALL) IN REMISSION (H): ICD-10-CM

## 2021-10-19 LAB
ALBUMIN SERPL-MCNC: 3.7 G/DL (ref 3.4–5)
ALP SERPL-CCNC: 73 U/L (ref 40–150)
ALT SERPL W P-5'-P-CCNC: 19 U/L (ref 0–70)
ANION GAP SERPL CALCULATED.3IONS-SCNC: 5 MMOL/L (ref 3–14)
AST SERPL W P-5'-P-CCNC: 18 U/L (ref 0–45)
BASOPHILS # BLD AUTO: 0 10E3/UL (ref 0–0.2)
BASOPHILS NFR BLD AUTO: 1 %
BILIRUB SERPL-MCNC: 0.5 MG/DL (ref 0.2–1.3)
BUN SERPL-MCNC: 35 MG/DL (ref 7–30)
CALCIUM SERPL-MCNC: 9.6 MG/DL (ref 8.5–10.1)
CHLORIDE BLD-SCNC: 110 MMOL/L (ref 94–109)
CMV DNA SPEC NAA+PROBE-ACNC: NOT DETECTED IU/ML
CO2 SERPL-SCNC: 25 MMOL/L (ref 20–32)
CREAT SERPL-MCNC: 1.64 MG/DL (ref 0.66–1.25)
EOSINOPHIL # BLD AUTO: 0.3 10E3/UL (ref 0–0.7)
EOSINOPHIL NFR BLD AUTO: 5 %
ERYTHROCYTE [DISTWIDTH] IN BLOOD BY AUTOMATED COUNT: 14.4 % (ref 10–15)
GFR SERPL CREATININE-BSD FRML MDRD: 44 ML/MIN/1.73M2
GLUCOSE BLD-MCNC: 113 MG/DL (ref 70–99)
HCT VFR BLD AUTO: 29.8 % (ref 40–53)
HGB BLD-MCNC: 9.8 G/DL (ref 13.3–17.7)
IMM GRANULOCYTES # BLD: 0 10E3/UL
IMM GRANULOCYTES NFR BLD: 0 %
LYMPHOCYTES # BLD AUTO: 1 10E3/UL (ref 0.8–5.3)
LYMPHOCYTES NFR BLD AUTO: 16 %
MAGNESIUM SERPL-MCNC: 1.9 MG/DL (ref 1.6–2.3)
MCH RBC QN AUTO: 32.7 PG (ref 26.5–33)
MCHC RBC AUTO-ENTMCNC: 32.9 G/DL (ref 31.5–36.5)
MCV RBC AUTO: 99 FL (ref 78–100)
MONOCYTES # BLD AUTO: 0.8 10E3/UL (ref 0–1.3)
MONOCYTES NFR BLD AUTO: 12 %
NEUTROPHILS # BLD AUTO: 4.1 10E3/UL (ref 1.6–8.3)
NEUTROPHILS NFR BLD AUTO: 66 %
NRBC # BLD AUTO: 0 10E3/UL
NRBC BLD AUTO-RTO: 0 /100
PLATELET # BLD AUTO: 185 10E3/UL (ref 150–450)
POTASSIUM BLD-SCNC: 4.6 MMOL/L (ref 3.4–5.3)
PROT SERPL-MCNC: 7.2 G/DL (ref 6.8–8.8)
RBC # BLD AUTO: 3 10E6/UL (ref 4.4–5.9)
SODIUM SERPL-SCNC: 140 MMOL/L (ref 133–144)
TACROLIMUS BLD-MCNC: 5.1 UG/L (ref 5–15)
TME LAST DOSE: NORMAL H
TME LAST DOSE: NORMAL H
WBC # BLD AUTO: 6.2 10E3/UL (ref 4–11)

## 2021-10-19 PROCEDURE — 99214 OFFICE O/P EST MOD 30 MIN: CPT

## 2021-10-19 PROCEDURE — 80197 ASSAY OF TACROLIMUS: CPT

## 2021-10-19 PROCEDURE — 36591 DRAW BLOOD OFF VENOUS DEVICE: CPT

## 2021-10-19 PROCEDURE — 80053 COMPREHEN METABOLIC PANEL: CPT

## 2021-10-19 PROCEDURE — 83735 ASSAY OF MAGNESIUM: CPT

## 2021-10-19 PROCEDURE — 85025 COMPLETE CBC W/AUTO DIFF WBC: CPT

## 2021-10-19 PROCEDURE — G0463 HOSPITAL OUTPT CLINIC VISIT: HCPCS | Mod: 25

## 2021-10-19 PROCEDURE — 250N000011 HC RX IP 250 OP 636: Performed by: PHYSICIAN ASSISTANT

## 2021-10-19 RX ORDER — HEPARIN SODIUM (PORCINE) LOCK FLUSH IV SOLN 100 UNIT/ML 100 UNIT/ML
5 SOLUTION INTRAVENOUS ONCE
Status: COMPLETED | OUTPATIENT
Start: 2021-10-19 | End: 2021-10-19

## 2021-10-19 RX ADMIN — Medication 5 ML: at 09:21

## 2021-10-19 ASSESSMENT — PAIN SCALES - GENERAL: PAINLEVEL: NO PAIN (0)

## 2021-10-19 NOTE — LETTER
10/19/2021         RE: Nik Nava  [address] [facility] [id number]        Dear Colleague,    Thank you for referring your patient, Nik Nava, to the [facility] BLOOD AND MARROW TRANSPLANT PROGRAM [place]. Please see a copy of my visit note below.    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 Haven't really found a cause or a solution. It fluctuates. No oral/tongue swelling. Not worse. No new rash. No new meds. No other issues other then fatigue.       Review of Systems                                                                                                                                         8 point Review of systems was negative other than noted above.      PHYSICAL EXAM                                                                                                                                                 KPS: 80  /82 (BP Location: Right arm, Patient Position: Sitting, Cuff Size: Adult Regular)   Pulse 87   Temp 98.1  F (36.7  C) (Oral)   Resp 16   Wt 102.6 kg (226 lb 3.2 oz)   SpO2 99%   BMI 30.67 kg/m      General: NAD  HEENT: sclera anicteric and non-injected. OP moist without lesions  Lungs: CTAB  Heart: RRR  Abd:  + BS. Soft. No tenderness with palpation  Skin: +facial erythema/edema noted.   Neuro: A&O,  EXT: No LE edema  Vascular Access: port in the right chest     KPS=90  Current aGVHD staging:  Skin 1, UGI 0, LGI 0, Liver 0 (10/19/2021)    Labs:  Lab Results   Component Value Date    WBC 6.2 10/19/2021    ANEU 1.9 09/07/2021    HGB 9.8 (L) 10/19/2021    HCT 29.8 (L) 10/19/2021     10/19/2021     10/14/2021    POTASSIUM 4.5 10/14/2021    CHLORIDE 107 10/14/2021    CO2 25 10/14/2021     (H) 10/14/2021    BUN 36 (H) 10/14/2021    CR 1.76 (H) 10/14/2021    MAG 1.8 10/14/2021    INR 0.97 09/02/2021    BILITOTAL 0.3  10/14/2021    AST 16 10/14/2021    ALT 19 10/14/2021    ALKPHOS 73 10/14/2021    PROTTOTAL 7.3 10/14/2021    ALBUMIN 3.6 10/14/2021         ASSESSMENT AND PLAN   Nik Nava is a 61 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  Here for flu/cy/TBI and sib allo sct  HCT-CI score: 3 (prior solid tumor)  - 9/2/2021 day +21 BMB: Slightly hypocellular marrow (cellularity estimated as 20 to 30%) with trilineage hematopoiesis, less than 1% blasts. No morphologic or immunophenotypic evidence for B lymphoblastic leukemia/lymphoma. BM DNA Chimerism 90% donor, FISH/CG pending.   - VNTR 94% CD33 lineage, 84% CD3 lineage (day 30 PB)     2.  HEME:   - Pt/donor both Opos  - Transfuse for hgb <7g/dL; plt < 10k.  No transfusions needs  -  pulmonary emboli: He developed a pulmonary emboli in the setting of receiving PEG asparaginase. On Xarelto     3.  FEN/Renal: Oral intake good/stable  - Cr improving running tac lower.   - Tac induced hypomag, continue 6 tabs/day  - He has a history of renal cancer and resection. has a single kidney.    4.    GVHD:   - On tac 2mg BID, level pending.     5.  ID:   - prophy acv/letrmovir, fluconazole, Pentamidine (10/8)   - CMV pending.  - influenza vacc given 10/12     6. Hx of graves disease; On synthroid     7. GI: no complaints  - prophy omeprazole. With this ongoing facial erythema/edema and no current indication will stop omeprazole. Will continue to simplify med list as able to see if we find a culprit.      8. CV: Norvasc held due to facial edema & erythema. Not the culprit but BP reasonable will not add back.      RTC Tue.     30 minutes spent on the date of the encounter doing chart review, review of test results, interpretation of tests, patient visit, documentation  and discussion with other provider(s)     Mayra Cosby PA-c  [phone]            Again, thank you for allowing me to participate in the care of your patient.        Sincerely,        BMT Advanced Practice Provider

## 2021-10-19 NOTE — PROGRESS NOTES
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 Haven't really found a cause or a solution. It fluctuates. No oral/tongue swelling. Not worse. No new rash. No new meds. No other issues other then fatigue.       Review of Systems                                                                                                                                         8 point Review of systems was negative other than noted above.      PHYSICAL EXAM                                                                                                                                                 KPS: 80  /82 (BP Location: Right arm, Patient Position: Sitting, Cuff Size: Adult Regular)   Pulse 87   Temp 98.1  F (36.7  C) (Oral)   Resp 16   Wt 102.6 kg (226 lb 3.2 oz)   SpO2 99%   BMI 30.67 kg/m      General: NAD  HEENT: sclera anicteric and non-injected. OP moist without lesions  Lungs: CTAB  Heart: RRR  Abd:  + BS. Soft. No tenderness with palpation  Skin: +facial erythema/edema noted.   Neuro: A&O,  EXT: No LE edema  Vascular Access: port in the right chest     KPS=90  Current aGVHD staging:  Skin 1, UGI 0, LGI 0, Liver 0 (10/19/2021)    Labs:  Lab Results   Component Value Date    WBC 6.2 10/19/2021    ANEU 1.9 09/07/2021    HGB 9.8 (L) 10/19/2021    HCT 29.8 (L) 10/19/2021     10/19/2021     10/14/2021    POTASSIUM 4.5 10/14/2021    CHLORIDE 107 10/14/2021    CO2 25 10/14/2021     (H) 10/14/2021    BUN 36 (H) 10/14/2021    CR 1.76 (H) 10/14/2021    MAG 1.8 10/14/2021    INR 0.97 09/02/2021    BILITOTAL 0.3 10/14/2021    AST 16 10/14/2021    ALT 19 10/14/2021    ALKPHOS 73 10/14/2021    PROTTOTAL 7.3 10/14/2021    ALBUMIN 3.6 10/14/2021         ASSESSMENT AND PLAN   Nik Nava is a 61 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  Here for flu/cy/TBI and sib allo sct  HCT-CI score: 3 (prior solid tumor)  - 9/2/2021 day +21 BMB: Slightly hypocellular marrow (cellularity estimated as 20 to 30%) with trilineage hematopoiesis, less than 1% blasts. No morphologic or immunophenotypic evidence for B lymphoblastic leukemia/lymphoma. BM DNA Chimerism 90% donor, FISH/CG pending.   - VNTR 94% CD33 lineage, 84% CD3 lineage (day 30 PB)     2.  HEME:   - Pt/donor both Opos  - Transfuse for hgb <7g/dL; plt < 10k.  No transfusions needs  -  pulmonary emboli: He developed a pulmonary emboli in the setting of receiving PEG asparaginase. On Xarelto     3.  FEN/Renal: Oral intake good/stable  - Cr improving running tac lower.   - Tac induced hypomag, continue 6 tabs/day  - He has a history of renal cancer and resection. has a single kidney.    4.    GVHD:   - On tac 2mg BID, level pending.     5.  ID:   - prophy acv/letrmovir, fluconazole, Pentamidine (10/8)   - CMV pending.  - influenza vacc given 10/12     6. Hx of graves disease; On synthroid     7. GI: no complaints  - prophy omeprazole. With this ongoing facial erythema/edema and no current indication will stop omeprazole. Will continue to simplify med list as able to see if we find a culprit.      8. CV: Norvasc held due to facial edema & erythema. Not the culprit but BP reasonable will not add back.      RTC Tue.     30 minutes spent on the date of the encounter doing chart review, review of test results, interpretation of tests, patient visit, documentation and discussion with other provider(s)     Mayra Cosby PA-c  [phone]

## 2021-10-19 NOTE — NURSING NOTE
"Oncology Rooming Note    October 19, 2021 9:56 AM   Nik Nava is a 61 year old male who presents for:    Chief Complaint   Patient presents with     Port Draw     Labs drawn via port by RN in lab. VS taken.      RECHECK     ALL here for provider visit     Initial Vitals: /82 (BP Location: Right arm, Patient Position: Sitting, Cuff Size: Adult Regular)   Pulse 87   Temp 98.1  F (36.7  C) (Oral)   Resp 16   Wt 102.6 kg (226 lb 3.2 oz)   SpO2 99%   BMI 30.67 kg/m   Estimated body mass index is 30.67 kg/m  as calculated from the following:    Height as of 9/17/21: 1.829 m (6' 0.01\").    Weight as of this encounter: 102.6 kg (226 lb 3.2 oz). Body surface area is 2.28 meters squared.  No Pain (0) Comment: Data Unavailable   No LMP for male patient.  Allergies reviewed: Yes  Medications reviewed: Yes    Medications: MEDICATION REFILLS NEEDED TODAY. Provider was notified.  Pharmacy name entered into [employer]:    [facility] - [place], [place] - [address] [facility] [place], [place] -  [facility] [phone]    Clinical concerns: None      Nico Tovar RN              "

## 2021-10-25 NOTE — PROGRESS NOTES
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  No pain, desquamation or pruritus.  He is not bothered by it at all.  Sometimes worse in the AM.  He and his wife wonder if the facial flushing is from the tac.  No new meds, supplements, detergents.  It seems to be concentrated at area of mask, however the last 3 days he has not worn the mask and it has appeared the same.  No SOB, stridor, lip/mucosal swelling.  No fever.  He is really doing well.  Only other issue is he isn't at 100% of his pre transplant energy level.  Wonders about his thyroid.       Review of Systems                                                                                                                                         8 point Review of systems was negative other than noted above.      PHYSICAL EXAM                                                                                                                                                 KPS: 80  Blood pressure (!) 145/86, pulse 62, temperature 97.9  F (36.6  C), temperature source Oral, resp. rate 16, weight 103.5 kg (228 lb 3.2 oz), SpO2 100 %.    General: NAD  HEENT: sclera anicteric and non-injected. OP moist without lesions.  No lip swelling.  Lungs: CTAB  Heart: RRR  Abd:  + BS. Soft. No tenderness with palpation  Skin: +facial erythema/edema noted.  No blisters/bullae, desquamation.  Neuro: A&O,  EXT: No LE edema  Vascular Access: port in the right chest     KPS=90  Current aGVHD staging:  Skin 1, UGI 0, LGI 0, Liver 0 (10/19/2021)    Labs:  Lab Results   Component Value Date    WBC 6.2 10/19/2021    ANEU 1.9 09/07/2021    HGB 9.8 (L) 10/19/2021    HCT 29.8 (L) 10/19/2021     10/19/2021     10/19/2021    POTASSIUM 4.6 10/19/2021    CHLORIDE 110 (H) 10/19/2021    CO2 25 10/19/2021     (H) 10/19/2021    BUN 35 (H) 10/19/2021    CR 1.64 (H) 10/19/2021     MAG 1.9 10/19/2021    INR 0.97 09/02/2021    BILITOTAL 0.5 10/19/2021    AST 18 10/19/2021    ALT 19 10/19/2021    ALKPHOS 73 10/19/2021    PROTTOTAL 7.2 10/19/2021    ALBUMIN 3.7 10/19/2021         ASSESSMENT AND PLAN   Nik Nava is a 61 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  Here for flu/cy/TBI and sib allo sct  HCT-CI score: 3 (prior solid tumor)  - 9/2/2021 day +21 BMB: Slightly hypocellular marrow (cellularity estimated as 20 to 30%) with trilineage hematopoiesis, less than 1% blasts. No morphologic or immunophenotypic evidence for B lymphoblastic leukemia/lymphoma. BM DNA Chimerism 90% donor, FISH/CG pending.   - VNTR 94% CD33 lineage, 84% CD3 lineage (day 30 PB)     2.  HEME:   - Pt/donor both Opos  - Transfuse for hgb <7g/dL; plt < 10k.  No transfusions needs  -  pulmonary emboli: He developed a pulmonary emboli in the setting of receiving PEG asparaginase. On Xarelto     3.  FEN/Renal: Oral intake good/stable  - Cr improving running tac lower.    - Tac induced hypomag, continue 6 tabs/day  - He has a history of renal cancer and resection. has a single kidney.    4.    GVHD:   - On tac 2mg BID, level 5.1 last week, pending from today    5.  ID:   - prophy acv/letrmovir, fluconazole, Pentamidine (10/8)   - CMV neg 10/19  - influenza vacc given 10/12     6. Hx of graves disease; On synthroid; check TSH next visit     7. CV: Norvasc held due to facial edema & erythema. Not the culprit but BP reasonable will not add back.      RTC   Has weekly appt for now--cancel 11/2 appt  11/9 lab/provider  11/15 sedated BM bx  11/18 review w/ Dr. Hill    30 minutes spent on the date of the encounter doing chart review, review of test results, interpretation of tests, patient visit,  documentation and discussion with other provider(s)     Lashanda Figueroa pa-c  [phone]

## 2021-10-26 ENCOUNTER — OFFICE VISIT (OUTPATIENT)
Dept: TRANSPLANT | Facility: CLINIC | Age: 61
End: 2021-10-26
Attending: PHYSICIAN ASSISTANT
Payer: COMMERCIAL

## 2021-10-26 ENCOUNTER — APPOINTMENT (OUTPATIENT)
Dept: LAB | Facility: CLINIC | Age: 61
End: 2021-10-26
Attending: PHYSICIAN ASSISTANT
Payer: COMMERCIAL

## 2021-10-26 VITALS
BODY MASS INDEX: 30.94 KG/M2 | RESPIRATION RATE: 16 BRPM | WEIGHT: 228.2 LBS | SYSTOLIC BLOOD PRESSURE: 145 MMHG | OXYGEN SATURATION: 100 % | HEART RATE: 62 BPM | DIASTOLIC BLOOD PRESSURE: 86 MMHG | TEMPERATURE: 97.9 F

## 2021-10-26 DIAGNOSIS — C91.01 ACUTE LYMPHOBLASTIC LEUKEMIA (ALL) IN REMISSION (H): ICD-10-CM

## 2021-10-26 LAB
ALBUMIN SERPL-MCNC: 3.8 G/DL (ref 3.4–5)
ALP SERPL-CCNC: 86 U/L (ref 40–150)
ALT SERPL W P-5'-P-CCNC: 25 U/L (ref 0–70)
ANION GAP SERPL CALCULATED.3IONS-SCNC: 6 MMOL/L (ref 3–14)
AST SERPL W P-5'-P-CCNC: 20 U/L (ref 0–45)
BASOPHILS # BLD MANUAL: 0 10E3/UL (ref 0–0.2)
BASOPHILS NFR BLD MANUAL: 0 %
BILIRUB SERPL-MCNC: 0.4 MG/DL (ref 0.2–1.3)
BUN SERPL-MCNC: 38 MG/DL (ref 7–30)
CALCIUM SERPL-MCNC: 9.8 MG/DL (ref 8.5–10.1)
CHLORIDE BLD-SCNC: 108 MMOL/L (ref 94–109)
CO2 SERPL-SCNC: 26 MMOL/L (ref 20–32)
CREAT SERPL-MCNC: 1.62 MG/DL (ref 0.66–1.25)
EOSINOPHIL # BLD MANUAL: 0.2 10E3/UL (ref 0–0.7)
EOSINOPHIL NFR BLD MANUAL: 4 %
ERYTHROCYTE [DISTWIDTH] IN BLOOD BY AUTOMATED COUNT: 14.2 % (ref 10–15)
GFR SERPL CREATININE-BSD FRML MDRD: 45 ML/MIN/1.73M2
GLUCOSE BLD-MCNC: 108 MG/DL (ref 70–99)
HCT VFR BLD AUTO: 31 % (ref 40–53)
HGB BLD-MCNC: 10.4 G/DL (ref 13.3–17.7)
LYMPHOCYTES # BLD MANUAL: 1.7 10E3/UL (ref 0.8–5.3)
LYMPHOCYTES NFR BLD MANUAL: 30 %
MAGNESIUM SERPL-MCNC: 2.5 MG/DL (ref 1.6–2.3)
MCH RBC QN AUTO: 32.8 PG (ref 26.5–33)
MCHC RBC AUTO-ENTMCNC: 33.5 G/DL (ref 31.5–36.5)
MCV RBC AUTO: 98 FL (ref 78–100)
MONOCYTES # BLD MANUAL: 0.2 10E3/UL (ref 0–1.3)
MONOCYTES NFR BLD MANUAL: 4 %
NEUTROPHILS # BLD MANUAL: 3.5 10E3/UL (ref 1.6–8.3)
NEUTROPHILS NFR BLD MANUAL: 62 %
PLAT MORPH BLD: NORMAL
PLATELET # BLD AUTO: 169 10E3/UL (ref 150–450)
POTASSIUM BLD-SCNC: 5 MMOL/L (ref 3.4–5.3)
PROT SERPL-MCNC: 7.5 G/DL (ref 6.8–8.8)
RBC # BLD AUTO: 3.17 10E6/UL (ref 4.4–5.9)
RBC MORPH BLD: NORMAL
SODIUM SERPL-SCNC: 140 MMOL/L (ref 133–144)
TACROLIMUS BLD-MCNC: 5.8 UG/L (ref 5–15)
TME LAST DOSE: NORMAL H
TME LAST DOSE: NORMAL H
WBC # BLD AUTO: 5.7 10E3/UL (ref 4–11)

## 2021-10-26 PROCEDURE — 85027 COMPLETE CBC AUTOMATED: CPT

## 2021-10-26 PROCEDURE — 99214 OFFICE O/P EST MOD 30 MIN: CPT

## 2021-10-26 PROCEDURE — 250N000011 HC RX IP 250 OP 636: Performed by: PHYSICIAN ASSISTANT

## 2021-10-26 PROCEDURE — 36591 DRAW BLOOD OFF VENOUS DEVICE: CPT

## 2021-10-26 PROCEDURE — 83735 ASSAY OF MAGNESIUM: CPT

## 2021-10-26 PROCEDURE — 80197 ASSAY OF TACROLIMUS: CPT | Performed by: INTERNAL MEDICINE

## 2021-10-26 PROCEDURE — 80053 COMPREHEN METABOLIC PANEL: CPT

## 2021-10-26 PROCEDURE — G0463 HOSPITAL OUTPT CLINIC VISIT: HCPCS

## 2021-10-26 RX ORDER — HEPARIN SODIUM (PORCINE) LOCK FLUSH IV SOLN 100 UNIT/ML 100 UNIT/ML
500 SOLUTION INTRAVENOUS ONCE
Status: COMPLETED | OUTPATIENT
Start: 2021-10-26 | End: 2021-10-26

## 2021-10-26 RX ORDER — MAGNESIUM OXIDE 400 MG/1
TABLET ORAL
Qty: 120 TABLET | Refills: 4 | COMMUNITY
Start: 2021-10-26 | End: 2021-11-18

## 2021-10-26 RX ADMIN — Medication 500 UNITS: at 10:00

## 2021-10-26 ASSESSMENT — PAIN SCALES - GENERAL: PAINLEVEL: NO PAIN (0)

## 2021-10-26 NOTE — LETTER
10/26/2021         RE: Nik Nava  [address] [facility] [id number]        Dear Colleague,    Thank you for referring your patient, Nik Nava, to the [facility] BLOOD AND MARROW TRANSPLANT PROGRAM [place]. Please see a copy of my visit note below.    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  No pain, desquamation or pruritus.  He is not bothered by it at all.  Sometimes worse in the AM.  He and his wife wonder if the facial flushing is from the tac.  No new meds, supplements, detergents.  It seems to be concentrated at area of mask, however the last 3 days he has not worn the mask and it has appeared the same.  No SOB, stridor, lip/mucosal swelling.  No fever.  He is really doing well.  Only other issue is he isn't at 100% of his pre transplant energy level.  Wonders about his thyroid.       Review of Systems                                                                                                                                         8 point Review of systems was negative other than noted above.      PHYSICAL EXAM                                                                                                                                                 KPS: 80  Blood pressure (!) 145/86, pulse 62, temperature 97.9  F (36.6  C), temperature source Oral, resp. rate 16, weight 103.5 kg (228 lb 3.2 oz), SpO2 100 %.    General: NAD  HEENT: sclera anicteric and non-injected. OP moist without lesions.  No lip swelling.  Lungs: CTAB  Heart: RRR  Abd:  + BS. Soft. No tenderness with palpation  Skin: +facial erythema/edema noted.  No blisters/bullae, desquamation.  Neuro: A&O,  EXT: No LE edema  Vascular Access: port in the right chest     KPS=90  Current aGVHD staging:  Skin 1, UGI 0, LGI 0, Liver 0 (10/19/2021)    Labs:  Lab Results   Component Value Date    WBC 6.2  10/19/2021    ANEU 1.9 09/07/2021    HGB 9.8 (L) 10/19/2021    HCT 29.8 (L) 10/19/2021     10/19/2021     10/19/2021    POTASSIUM 4.6 10/19/2021    CHLORIDE 110 (H) 10/19/2021    CO2 25 10/19/2021     (H) 10/19/2021    BUN 35 (H) 10/19/2021    CR 1.64 (H) 10/19/2021    MAG 1.9 10/19/2021    INR 0.97 09/02/2021    BILITOTAL 0.5 10/19/2021    AST 18 10/19/2021    ALT 19 10/19/2021    ALKPHOS 73 10/19/2021    PROTTOTAL 7.2 10/19/2021    ALBUMIN 3.7 10/19/2021         ASSESSMENT AND PLAN   Nik Nava is a 61 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  Here for flu/cy/TBI and sib allo sct  HCT-CI score: 3 (prior solid tumor)  - 9/2/2021 day +21 BMB: Slightly hypocellular marrow (cellularity estimated as 20 to 30%) with trilineage hematopoiesis, less than 1% blasts. No morphologic or immunophenotypic evidence for B lymphoblastic leukemia/lymphoma. BM DNA Chimerism 90% donor, FISH/CG pending.   - VNTR 94% CD33 lineage, 84% CD3 lineage (day 30 PB)     2.  HEME:   - Pt/donor both Opos  - Transfuse for hgb <7g/dL; plt < 10k.  No transfusions needs  -  pulmonary emboli: He developed a pulmonary emboli in the setting of receiving PEG asparaginase. On Xarelto     3.  FEN/Renal: Oral intake good/stable  - Cr improving running tac lower.    - Tac induced hypomag, continue 6 tabs/day  - He has a history of renal cancer and resection. has a single kidney.    4.    GVHD:   - On tac 2mg BID, level 5.1 last week, pending from today    5.  ID:   - prophy acv/letrmovir, fluconazole, Pentamidine (10/8)   - CMV neg 10/19  - influenza vacc given 10/12     6. Hx of graves disease; On synthroid; check TSH next visit     7. CV: Norvasc held due to facial edema & erythema. Not the culprit but BP  reasonable will not add back.      RTC   Has weekly appt for now--cancel 11/2 appt  11/9 lab/provider  11/15 sedated BM bx  11/18 review w/ Dr. Hill    30 minutes spent on the date of the encounter doing chart review, review of test results, interpretation of tests, patient visit, documentation and discussion with other provider(s)     Lashanda Figueroa pa-c  [phone]              Again, thank you for allowing me to participate in the care of your patient.        Sincerely,        BMT Advanced Practice Provider

## 2021-10-26 NOTE — NURSING NOTE
"Oncology Rooming Note    October 26, 2021 10:10 AM   Nik Nava is a 61 year old male who presents for:    Chief Complaint   Patient presents with     Port Draw     Labs drawn via port by RN in lab.  VS taken     Oncology Clinic Visit     ALL in remission     Initial Vitals: BP (!) 145/86   Pulse 62   Temp 97.9  F (36.6  C) (Oral)   Resp 16   Wt 103.5 kg (228 lb 3.2 oz)   SpO2 100%   BMI 30.94 kg/m   Estimated body mass index is 30.94 kg/m  as calculated from the following:    Height as of 9/17/21: 1.829 m (6' 0.01\").    Weight as of this encounter: 103.5 kg (228 lb 3.2 oz). Body surface area is 2.29 meters squared.  No Pain (0) Comment: Data Unavailable   No LMP for male patient.  Allergies reviewed: Yes  Medications reviewed: Yes    Medications: Medication refills not needed today.  Pharmacy name entered into [employer]:    [facility] - [place], [place] - [address] [facility] [place], [place] - [phone]    Clinical concerns: NONE       Yamilet Marley CMA            "

## 2021-10-26 NOTE — NURSING NOTE
"Chief Complaint   Patient presents with     Port Draw     Labs drawn via port by RN in lab.  VS taken       Port accessed with 20 gauge 3/4\" needle by RN, labs collected, line flushed with saline and heparin, then de-accessed  Vitals taken. Pt checked in for appointment(s).    Rajni Menezes RN    "

## 2021-10-27 LAB — CMV DNA SPEC NAA+PROBE-ACNC: NOT DETECTED IU/ML

## 2021-11-09 ENCOUNTER — OFFICE VISIT (OUTPATIENT)
Dept: TRANSPLANT | Facility: CLINIC | Age: 61
End: 2021-11-09
Attending: INTERNAL MEDICINE
Payer: COMMERCIAL

## 2021-11-09 ENCOUNTER — APPOINTMENT (OUTPATIENT)
Dept: LAB | Facility: CLINIC | Age: 61
End: 2021-11-09
Attending: INTERNAL MEDICINE
Payer: COMMERCIAL

## 2021-11-09 VITALS
TEMPERATURE: 97.6 F | BODY MASS INDEX: 31.41 KG/M2 | HEART RATE: 70 BPM | HEIGHT: 72 IN | OXYGEN SATURATION: 99 % | RESPIRATION RATE: 16 BRPM | DIASTOLIC BLOOD PRESSURE: 74 MMHG | SYSTOLIC BLOOD PRESSURE: 148 MMHG | WEIGHT: 231.9 LBS

## 2021-11-09 DIAGNOSIS — C91.01 ACUTE LYMPHOBLASTIC LEUKEMIA (ALL) IN REMISSION (H): ICD-10-CM

## 2021-11-09 DIAGNOSIS — Z94.81 STATUS POST BONE MARROW TRANSPLANT (H): Primary | ICD-10-CM

## 2021-11-09 LAB
ANION GAP SERPL CALCULATED.3IONS-SCNC: 6 MMOL/L (ref 3–14)
BASOPHILS # BLD AUTO: 0 10E3/UL (ref 0–0.2)
BASOPHILS NFR BLD AUTO: 1 %
BUN SERPL-MCNC: 46 MG/DL (ref 7–30)
CALCIUM SERPL-MCNC: 10.2 MG/DL (ref 8.5–10.1)
CHLORIDE BLD-SCNC: 109 MMOL/L (ref 94–109)
CO2 SERPL-SCNC: 26 MMOL/L (ref 20–32)
CREAT SERPL-MCNC: 1.64 MG/DL (ref 0.66–1.25)
EOSINOPHIL # BLD AUTO: 0.2 10E3/UL (ref 0–0.7)
EOSINOPHIL NFR BLD AUTO: 3 %
ERYTHROCYTE [DISTWIDTH] IN BLOOD BY AUTOMATED COUNT: 14.2 % (ref 10–15)
GFR SERPL CREATININE-BSD FRML MDRD: 44 ML/MIN/1.73M2
GLUCOSE BLD-MCNC: 106 MG/DL (ref 70–99)
HCT VFR BLD AUTO: 31.6 % (ref 40–53)
HGB BLD-MCNC: 10.3 G/DL (ref 13.3–17.7)
IMM GRANULOCYTES # BLD: 0 10E3/UL
IMM GRANULOCYTES NFR BLD: 1 %
LYMPHOCYTES # BLD AUTO: 2 10E3/UL (ref 0.8–5.3)
LYMPHOCYTES NFR BLD AUTO: 32 %
MAGNESIUM SERPL-MCNC: 2 MG/DL (ref 1.6–2.3)
MCH RBC QN AUTO: 32.4 PG (ref 26.5–33)
MCHC RBC AUTO-ENTMCNC: 32.6 G/DL (ref 31.5–36.5)
MCV RBC AUTO: 99 FL (ref 78–100)
MONOCYTES # BLD AUTO: 0.8 10E3/UL (ref 0–1.3)
MONOCYTES NFR BLD AUTO: 12 %
NEUTROPHILS # BLD AUTO: 3.2 10E3/UL (ref 1.6–8.3)
NEUTROPHILS NFR BLD AUTO: 51 %
NRBC # BLD AUTO: 0 10E3/UL
NRBC BLD AUTO-RTO: 0 /100
PLATELET # BLD AUTO: 168 10E3/UL (ref 150–450)
POTASSIUM BLD-SCNC: 4.8 MMOL/L (ref 3.4–5.3)
RBC # BLD AUTO: 3.18 10E6/UL (ref 4.4–5.9)
SODIUM SERPL-SCNC: 141 MMOL/L (ref 133–144)
T4 FREE SERPL-MCNC: 1.28 NG/DL (ref 0.76–1.46)
TACROLIMUS BLD-MCNC: 4.9 UG/L (ref 5–15)
TME LAST DOSE: ABNORMAL H
TME LAST DOSE: ABNORMAL H
TSH SERPL DL<=0.005 MIU/L-ACNC: 0.34 MU/L (ref 0.4–4)
WBC # BLD AUTO: 6.2 10E3/UL (ref 4–11)

## 2021-11-09 PROCEDURE — 99215 OFFICE O/P EST HI 40 MIN: CPT

## 2021-11-09 PROCEDURE — 80197 ASSAY OF TACROLIMUS: CPT

## 2021-11-09 PROCEDURE — 85025 COMPLETE CBC W/AUTO DIFF WBC: CPT

## 2021-11-09 PROCEDURE — 250N000011 HC RX IP 250 OP 636: Performed by: INTERNAL MEDICINE

## 2021-11-09 PROCEDURE — 83735 ASSAY OF MAGNESIUM: CPT

## 2021-11-09 PROCEDURE — 36591 DRAW BLOOD OFF VENOUS DEVICE: CPT

## 2021-11-09 PROCEDURE — 84439 ASSAY OF FREE THYROXINE: CPT

## 2021-11-09 PROCEDURE — 80048 BASIC METABOLIC PNL TOTAL CA: CPT

## 2021-11-09 PROCEDURE — 84443 ASSAY THYROID STIM HORMONE: CPT

## 2021-11-09 PROCEDURE — G0463 HOSPITAL OUTPT CLINIC VISIT: HCPCS

## 2021-11-09 RX ORDER — LORAZEPAM 1 MG/1
1 TABLET ORAL
Qty: 30 TABLET | Refills: 0 | Status: SHIPPED | OUTPATIENT
Start: 2021-11-09 | End: 2021-12-14

## 2021-11-09 RX ORDER — HEPARIN SODIUM (PORCINE) LOCK FLUSH IV SOLN 100 UNIT/ML 100 UNIT/ML
5 SOLUTION INTRAVENOUS ONCE
Status: COMPLETED | OUTPATIENT
Start: 2021-11-09 | End: 2021-11-09

## 2021-11-09 RX ADMIN — Medication 5 ML: at 09:33

## 2021-11-09 ASSESSMENT — PAIN SCALES - GENERAL: PAINLEVEL: NO PAIN (0)

## 2021-11-09 ASSESSMENT — MIFFLIN-ST. JEOR: SCORE: 1885.02

## 2021-11-09 NOTE — LETTER
Date:December 11, 2021      Provider requested that no letter be sent. Do not send.       [facility]

## 2021-11-09 NOTE — PROGRESS NOTES
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 His face is still erythematous although it does not bother him at all; non-pruritic. He has some rash on his upper back as well. His wife has been applying tmc to back only when it looks rashy and hasn't applied it for almost a week. He uses hydrocortisone on his face every few days and other days uses regular lotion. He took an omeprazole in the night as he awoke with heartburn. Otherwise eating/drinking okay. No n/v/d. No fevers. No cough or congestion. He occasionally feels \"something in his left chest\" for seconds and describes it as SOB. Denies chest pain . He thinks it feels more like his lungs but hard to describe and it's very intermittent. No chest or respiratory symptoms today.       Review of Systems                                                                                                                                         8 point Review of systems was negative other than noted above.      PHYSICAL EXAM                                                                                                                                                 KPS: 80  Blood pressure (!) 148/74, pulse 70, temperature 97.6  F (36.4  C), temperature source Oral, resp. rate 16, height 1.829 m (6' 0.01\"), weight 105.2 kg (231 lb 14.4 oz), SpO2 99 %.    General: NAD  HEENT: sclera anicteric and non-injected. OP moist without lesions.  No lip swelling.  Lungs: CTAB  Heart: RRR  Abd:  + BS. Soft. No tenderness with palpation  Skin: +facial erythema/edema noted.  No blisters/bullae, desquamation. Mild erythema on upper back.  Neuro: A&O,  EXT: No LE edema  Vascular Access: port in the right chest     KPS=90  Current aGVHD staging:  Skin 1, UGI 0, LGI 0, Liver 0 (10/19/2021)    Labs:  Lab Results   Component Value Date    WBC 6.2 11/09/2021    ANEU 3.5 10/26/2021    HGB 10.3 " (L) 11/09/2021    HCT 31.6 (L) 11/09/2021     11/09/2021     11/09/2021    POTASSIUM 4.8 11/09/2021    CHLORIDE 109 11/09/2021    CO2 26 11/09/2021     (H) 11/09/2021    BUN 46 (H) 11/09/2021    CR 1.64 (H) 11/09/2021    MAG 2.0 11/09/2021    INR 0.97 09/02/2021    BILITOTAL 0.4 10/26/2021    AST 20 10/26/2021    ALT 25 10/26/2021    ALKPHOS 86 10/26/2021    PROTTOTAL 7.5 10/26/2021    ALBUMIN 3.8 10/26/2021         ASSESSMENT AND PLAN   Nik Nava is a 61 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  Here for flu/cy/TBI and sib allo sct  HCT-CI score: 3 (prior solid tumor)  - 9/2/2021 day +21 BMB: Slightly hypocellular marrow (cellularity estimated as 20 to 30%) with trilineage hematopoiesis, less than 1% blasts. No morphologic or immunophenotypic evidence for B lymphoblastic leukemia/lymphoma. BM DNA Chimerism 90% donor, FISH/CG pending.   - VNTR 94% CD33 lineage, 84% CD3 lineage (day 30 PB)     2.  HEME:   - Pt/donor both Opos  - Transfuse for hgb <7g/dL; plt < 10k.  No transfusions needs  -  pulmonary emboli: He developed a pulmonary emboli in the setting of receiving PEG asparaginase. On Xarelto     3.  FEN/Renal: Oral intake good/stable  - Cr stable- running tac lower.    - Tac induced hypomag, continue 6 tabs/day  - He has a history of renal cancer and resection. has a single kidney.    4.    GVHD:   - On tac 2mg BID, level 5.8 10/26. Level pending today  - skin biopsied 8/30 (back)-subtle interface dermatitis, suggestive of gvhd? Advised to use TMC 2xdaily to back and hydrocortisone daily to face.    5.  ID:   - prophy acv/letrmovir, fluconazole, Pentamidine (10/8)   - CMV neg 10/19  - influenza vacc given 10/26. He is interested in getting the Covid vaccine as soon  as possible     6. Hx of graves disease; On synthroid; TSH was checked today?  0.34 on Synthroid 200mcg/day. T4 ok. No changes today. Will recheck it     7. CV: Norvasc 5mg daily added back today.    8. GI: omeprazole for heartburn    Dispo:  Continue creams but use consistently  Add back norvasc  Add back omeprazole  ED if sxs of chest pain.    RTC   11/15 sedated BM bx  11/18 review w/ Dr. Hill  Earlier prn worsening rash    40 minutes spent on the date of the encounter doing chart review, review of test results, interpretation of tests, patient visit, documentation and discussion with other provider(s)       Patito Mims NP

## 2021-11-09 NOTE — NURSING NOTE
"Oncology Rooming Note    November 9, 2021 9:52 AM   Nik Nava is a 61 year old male who presents for:    Chief Complaint   Patient presents with     Oncology Clinic Visit     [facility] RETURN - ALL     Initial Vitals: BP (!) 148/74 (BP Location: Right arm, Patient Position: Sitting, Cuff Size: Adult Regular)   Pulse 70   Temp 97.6  F (36.4  C) (Oral)   Resp 16   Ht 1.829 m (6' 0.01\")   Wt 105.2 kg (231 lb 14.4 oz)   SpO2 99%   BMI 31.44 kg/m   Estimated body mass index is 31.44 kg/m  as calculated from the following:    Height as of this encounter: 1.829 m (6' 0.01\").    Weight as of this encounter: 105.2 kg (231 lb 14.4 oz). Body surface area is 2.31 meters squared.  No Pain (0) Comment: Data Unavailable   No LMP for male patient.  Allergies reviewed: Yes  Medications reviewed: Yes    Medications: Medication refills not needed today.  Pharmacy name entered into [employer]:    [facility] - [place], [place] - [address] [facility] [place], [place] - [phone]    Clinical concerns: No new concerns. Provider was notified.      Kelvin Gomez LPN            "

## 2021-11-09 NOTE — LETTER
"    11/9/2021         RE: Nik Nava  [address] [address] [address]        Dear Colleague,    Thank you for referring your patient, Nik Nava, to the [facility] BLOOD AND MARROW TRANSPLANT PROGRAM [place]. Please see a copy of my visit note below.    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 His face is still erythematous although it does not bother him at all; non-pruritic. He has some rash on his upper back as well. His wife has been applying tmc to back only when it looks rashy and hasn't applied it for almost a week. He uses hydrocortisone on his face every few days and other days uses regular lotion. He took an omeprazole in the night as he awoke with heartburn. Otherwise eating/drinking okay. No n/v/d. No fevers. No cough or congestion. He occasionally feels \"something in his left chest\" for seconds and describes it as SOB. Denies chest pain . He thinks it feels more like his lungs but hard to describe and it's very intermittent. No chest or respiratory symptoms today.       Review of Systems                                                                                                                                         8 point Review of systems was negative other than noted above.      PHYSICAL EXAM                                                                                                                                                 KPS: 80  Blood pressure (!) 148/74, pulse 70, temperature 97.6  F (36.4  C), temperature source Oral, resp. rate 16, height 1.829 m (6' 0.01\"), weight 105.2 kg (231 lb 14.4 oz), SpO2 99 %.    General: NAD  HEENT: sclera anicteric and non-injected. OP moist without lesions.  No lip swelling.  Lungs: CTAB  Heart: RRR  Abd:  + BS. Soft. No tenderness with palpation  Skin: +facial erythema/edema noted.  No blisters/bullae, desquamation. " Mild erythema on upper back.  Neuro: A&O,  EXT: No LE edema  Vascular Access: port in the right chest     KPS=90  Current aGVHD staging:  Skin 1, UGI 0, LGI 0, Liver 0 (10/19/2021)    Labs:  Lab Results   Component Value Date    WBC 6.2 11/09/2021    ANEU 3.5 10/26/2021    HGB 10.3 (L) 11/09/2021    HCT 31.6 (L) 11/09/2021     11/09/2021     11/09/2021    POTASSIUM 4.8 11/09/2021    CHLORIDE 109 11/09/2021    CO2 26 11/09/2021     (H) 11/09/2021    BUN 46 (H) 11/09/2021    CR 1.64 (H) 11/09/2021    MAG 2.0 11/09/2021    INR 0.97 09/02/2021    BILITOTAL 0.4 10/26/2021    AST 20 10/26/2021    ALT 25 10/26/2021    ALKPHOS 86 10/26/2021    PROTTOTAL 7.5 10/26/2021    ALBUMIN 3.8 10/26/2021         ASSESSMENT AND PLAN   Nik Nava is a 61 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  Here for flu/cy/TBI and sib allo sct  HCT-CI score: 3 (prior solid tumor)  - 9/2/2021 day +21 BMB: Slightly hypocellular marrow (cellularity estimated as 20 to 30%) with trilineage hematopoiesis, less than 1% blasts. No morphologic or immunophenotypic evidence for B lymphoblastic leukemia/lymphoma. BM DNA Chimerism 90% donor, FISH/CG pending.   - VNTR 94% CD33 lineage, 84% CD3 lineage (day 30 PB)     2.  HEME:   - Pt/donor both Opos  - Transfuse for hgb <7g/dL; plt < 10k.  No transfusions needs  -  pulmonary emboli: He developed a pulmonary emboli in the setting of receiving PEG asparaginase. On Xarelto     3.  FEN/Renal: Oral intake good/stable  - Cr stable- running tac lower.    - Tac induced hypomag, continue 6 tabs/day  - He has a history of renal cancer and resection. has a single kidney.    4.    GVHD:   - On tac 2mg BID, level 5.8 10/26. Level pending today  - skin biopsied 8/30 (back)-subtle  interface dermatitis, suggestive of gvhd? Advised to use TMC 2xdaily to back and hydrocortisone daily to face.    5.  ID:   - prophy acv/letrmovir, fluconazole, Pentamidine (10/8)   - CMV neg 10/19  - influenza vacc given 10/26. He is interested in getting the Covid vaccine as soon as possible     6. Hx of graves disease; On synthroid; TSH was checked today?  0.34 on Synthroid 200mcg/day. T4 ok. No changes today. Will recheck it     7. CV: Norvasc 5mg daily added back today.    8. GI: omeprazole for heartburn    Dispo:  Continue creams but use consistently  Add back norvasc  Add back omeprazole  ED if sxs of chest pain.    RTC   11/15 sedated BM bx  11/18 review w/ Dr. Hill  Earlier prn worsening rash    40 minutes spent on the date of the encounter doing chart review, review of test results, interpretation of tests, patient visit, documentation and discussion with other provider(s)       Patito Mims NP            Again, thank you for allowing me to participate in the care of your patient.        Sincerely,        BMT Advanced Practice Provider

## 2021-11-12 ENCOUNTER — LAB (OUTPATIENT)
Dept: LAB | Facility: CLINIC | Age: 61
End: 2021-11-12
Payer: COMMERCIAL

## 2021-11-12 ENCOUNTER — ANESTHESIA EVENT (OUTPATIENT)
Dept: SURGERY | Facility: AMBULATORY SURGERY CENTER | Age: 61
End: 2021-11-12
Payer: COMMERCIAL

## 2021-11-12 DIAGNOSIS — Z11.59 ENCOUNTER FOR SCREENING FOR OTHER VIRAL DISEASES: ICD-10-CM

## 2021-11-12 PROCEDURE — U0005 INFEC AGEN DETEC AMPLI PROBE: HCPCS

## 2021-11-12 PROCEDURE — U0003 INFECTIOUS AGENT DETECTION BY NUCLEIC ACID (DNA OR RNA); SEVERE ACUTE RESPIRATORY SYNDROME CORONAVIRUS 2 (SARS-COV-2) (CORONAVIRUS DISEASE [COVID-19]), AMPLIFIED PROBE TECHNIQUE, MAKING USE OF HIGH THROUGHPUT TECHNOLOGIES AS DESCRIBED BY CMS-2020-01-R: HCPCS

## 2021-11-12 RX ORDER — ALBUTEROL SULFATE 0.83 MG/ML
2.5 SOLUTION RESPIRATORY (INHALATION) EVERY 4 HOURS PRN
Status: CANCELLED | OUTPATIENT
Start: 2021-11-12

## 2021-11-12 RX ORDER — LORAZEPAM 2 MG/ML
.5-1 INJECTION INTRAMUSCULAR
Status: CANCELLED | OUTPATIENT
Start: 2021-11-12

## 2021-11-12 RX ORDER — KETOROLAC TROMETHAMINE 30 MG/ML
15 INJECTION, SOLUTION INTRAMUSCULAR; INTRAVENOUS EVERY 6 HOURS PRN
Status: CANCELLED | OUTPATIENT
Start: 2021-11-12 | End: 2021-11-17

## 2021-11-12 RX ORDER — OXYCODONE HYDROCHLORIDE 5 MG/1
5 TABLET ORAL EVERY 4 HOURS PRN
Status: CANCELLED | OUTPATIENT
Start: 2021-11-12

## 2021-11-12 RX ORDER — FENTANYL CITRATE 50 UG/ML
25-50 INJECTION, SOLUTION INTRAMUSCULAR; INTRAVENOUS EVERY 5 MIN PRN
Status: CANCELLED | OUTPATIENT
Start: 2021-11-12

## 2021-11-12 RX ORDER — SODIUM CHLORIDE, SODIUM LACTATE, POTASSIUM CHLORIDE, CALCIUM CHLORIDE 600; 310; 30; 20 MG/100ML; MG/100ML; MG/100ML; MG/100ML
INJECTION, SOLUTION INTRAVENOUS CONTINUOUS
Status: CANCELLED | OUTPATIENT
Start: 2021-11-12

## 2021-11-12 RX ORDER — FENTANYL CITRATE 50 UG/ML
25 INJECTION, SOLUTION INTRAMUSCULAR; INTRAVENOUS
Status: CANCELLED | OUTPATIENT
Start: 2021-11-12

## 2021-11-12 RX ORDER — ONDANSETRON 2 MG/ML
4 INJECTION INTRAMUSCULAR; INTRAVENOUS EVERY 30 MIN PRN
Status: CANCELLED | OUTPATIENT
Start: 2021-11-12

## 2021-11-12 RX ORDER — HYDROMORPHONE HYDROCHLORIDE 1 MG/ML
.2-.4 INJECTION, SOLUTION INTRAMUSCULAR; INTRAVENOUS; SUBCUTANEOUS EVERY 5 MIN PRN
Status: CANCELLED | OUTPATIENT
Start: 2021-11-12

## 2021-11-12 RX ORDER — MEPERIDINE HYDROCHLORIDE 25 MG/ML
12.5 INJECTION INTRAMUSCULAR; INTRAVENOUS; SUBCUTANEOUS
Status: CANCELLED | OUTPATIENT
Start: 2021-11-12

## 2021-11-12 RX ORDER — ONDANSETRON 4 MG/1
4 TABLET, ORALLY DISINTEGRATING ORAL EVERY 30 MIN PRN
Status: CANCELLED | OUTPATIENT
Start: 2021-11-12

## 2021-11-12 NOTE — ANESTHESIA PREPROCEDURE EVALUATION
Anesthesia Pre-Procedure Evaluation    Patient: Nik Nava   MRN: [id number] : 1960        Preoperative Diagnosis: 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) [C91.01]    Procedure : Procedure(s):  BIOPSY, BONE MARROW          Past Medical History:   Diagnosis Date     Cancer (H)     renal cell     CKD (chronic kidney disease)      Thyroid disease       Past Surgical History:   Procedure Laterality Date     BACK SURGERY  2017     BONE MARROW BIOPSY, BONE SPECIMEN, NEEDLE/TROCAR Left 2021    Procedure: BIOPSY, BONE MARROW;  Surgeon: Jailyn Ny, PEE CNP;  Location: [facility] OR     IR CVC TUNNEL PLACEMENT > 5 YRS OF AGE  2021     IR CVC TUNNEL REMOVAL LEFT  2021      Allergies   Allergen Reactions     Sulfamethoxazole W/Trimethoprim Rash      Social History     Tobacco Use     Smoking status: Former Smoker     Packs/day: 2.00     Years: 30.00     Pack years: 60.00     Quit date: 2019     Years since quittin.5     Smokeless tobacco: Never Used   Substance Use Topics     Alcohol use: Not Currently      Wt Readings from Last 1 Encounters:   21 105.2 kg (231 lb 14.4 oz)        Anesthesia Evaluation            ROS/MED HX  ENT/Pulmonary:       Neurologic:       Cardiovascular:     (+) hypertension-----Previous cardiac testing   Echo: Date: 21 Results:  Interpretation Summary  Global and regional left ventricular function is normal with an EF of 55-60%.  3D LVEF volumetric analysis is 59%.  Global peak LV longitudinal strain is averaged at -20%. This is normal (normal  <-18%).  Global right ventricular function is normal. The right ventricle is normal  size.  No significant valvular abnormalities.  IVC diameter <2.1 cm collapsing >50% with sniff suggests a normal RA pressure  of 3 mmHg.  There is no prior study for direct comparison.  Stress Test: Date: Results:    ECG Reviewed: Date: Results:    Cath: Date: Results:      METS/Exercise Tolerance:     Hematologic:      (+) anemia,     Musculoskeletal:       GI/Hepatic:       Renal/Genitourinary:     (+) renal disease, type: CRI,     Endo:     (+) thyroid problem, hypothyroidism, Obesity,     Psychiatric/Substance Use:       Infectious Disease:       Malignancy: Comment: Renal cancer, ALL      Other:            Physical Exam    Airway        Mallampati: I   TM distance: > 3 FB   Neck ROM: full   Mouth opening: > 3 cm    Respiratory Devices and Support         Dental  no notable dental history         Cardiovascular   cardiovascular exam normal          Pulmonary   pulmonary exam normal                OUTSIDE LABS:  CBC:   Lab Results   Component Value Date    WBC 6.2 11/09/2021    WBC 5.7 10/26/2021    HGB 10.3 (L) 11/09/2021    HGB 10.4 (L) 10/26/2021    HCT 31.6 (L) 11/09/2021    HCT 31.0 (L) 10/26/2021     11/09/2021     10/26/2021     BMP:   Lab Results   Component Value Date     11/09/2021     10/26/2021    POTASSIUM 4.8 11/09/2021    POTASSIUM 5.0 10/26/2021    CHLORIDE 109 11/09/2021    CHLORIDE 108 10/26/2021    CO2 26 11/09/2021    CO2 26 10/26/2021    BUN 46 (H) 11/09/2021    BUN 38 (H) 10/26/2021    CR 1.64 (H) 11/09/2021    CR 1.62 (H) 10/26/2021     (H) 11/09/2021     (H) 10/26/2021     COAGS:   Lab Results   Component Value Date    PTT 28 08/03/2021    INR 0.97 09/02/2021     POC: No results found for: BGM, HCG, HCGS  HEPATIC:   Lab Results   Component Value Date    ALBUMIN 3.8 10/26/2021    PROTTOTAL 7.5 10/26/2021    ALT 25 10/26/2021    AST 20 10/26/2021    ALKPHOS 86 10/26/2021    BILITOTAL 0.4 10/26/2021     OTHER:   Lab Results   Component Value Date    LACT 0.6 (L) 09/12/2021    DAMON 10.2 (H) 11/09/2021    PHOS 3.5 08/22/2021    MAG 2.0 11/09/2021    TSH 0.34 (L) 11/09/2021    T4 1.28 11/09/2021    CRP 4.5 09/12/2021    SED 56 (H) 09/12/2021       Anesthesia Plan    ASA Status:  3   NPO Status:  NPO Appropriate    Anesthesia Type: MAC.     - Reason for MAC: straight  local not clinically adequate   Induction: Intravenous, Propofol.   Maintenance: Balanced.        Consents    Anesthesia Plan(s) and associated risks, benefits, and realistic alternatives discussed. Questions answered and patient/representative(s) expressed understanding.     - Discussed with:  Patient      - Extended Intubation/Ventilatory Support Discussed: No.      - Patient is DNR/DNI Status: No    Use of blood products discussed: No .     Postoperative Care    Pain management: IV analgesics, Oral pain medications, Multi-modal analgesia.   PONV prophylaxis: Dexamethasone or Solumedrol, Ondansetron (or other 5HT-3)     Comments:                Avel Kim MD

## 2021-11-13 LAB — SARS-COV-2 RNA RESP QL NAA+PROBE: NEGATIVE

## 2021-11-14 RX ORDER — LIDOCAINE 40 MG/G
CREAM TOPICAL
Status: CANCELLED | OUTPATIENT
Start: 2021-11-14

## 2021-11-14 RX ORDER — LIDOCAINE HYDROCHLORIDE 10 MG/ML
8-10 INJECTION, SOLUTION EPIDURAL; INFILTRATION; INTRACAUDAL; PERINEURAL
Status: CANCELLED | OUTPATIENT
Start: 2021-11-14

## 2021-11-15 ENCOUNTER — ANESTHESIA (OUTPATIENT)
Dept: SURGERY | Facility: AMBULATORY SURGERY CENTER | Age: 61
End: 2021-11-15
Payer: COMMERCIAL

## 2021-11-15 ENCOUNTER — HOSPITAL ENCOUNTER (OUTPATIENT)
Facility: AMBULATORY SURGERY CENTER | Age: 61
End: 2021-11-15
Attending: PHYSICIAN ASSISTANT
Payer: COMMERCIAL

## 2021-11-15 ENCOUNTER — APPOINTMENT (OUTPATIENT)
Dept: LAB | Facility: CLINIC | Age: 61
End: 2021-11-15
Attending: PHYSICIAN ASSISTANT
Payer: COMMERCIAL

## 2021-11-15 ENCOUNTER — ONCOLOGY VISIT (OUTPATIENT)
Dept: TRANSPLANT | Facility: CLINIC | Age: 61
End: 2021-11-15
Attending: PHYSICIAN ASSISTANT
Payer: COMMERCIAL

## 2021-11-15 VITALS
TEMPERATURE: 97.2 F | SYSTOLIC BLOOD PRESSURE: 123 MMHG | WEIGHT: 226 LBS | BODY MASS INDEX: 29.95 KG/M2 | DIASTOLIC BLOOD PRESSURE: 77 MMHG | HEIGHT: 73 IN | OXYGEN SATURATION: 99 % | HEART RATE: 59 BPM | RESPIRATION RATE: 20 BRPM

## 2021-11-15 VITALS
TEMPERATURE: 97.6 F | RESPIRATION RATE: 18 BRPM | SYSTOLIC BLOOD PRESSURE: 135 MMHG | OXYGEN SATURATION: 99 % | DIASTOLIC BLOOD PRESSURE: 79 MMHG | BODY MASS INDEX: 30.64 KG/M2 | HEART RATE: 77 BPM | WEIGHT: 226 LBS

## 2021-11-15 VITALS — HEART RATE: 86 BPM

## 2021-11-15 DIAGNOSIS — C91.01 ACUTE LYMPHOBLASTIC LEUKEMIA (ALL) IN REMISSION (H): Primary | ICD-10-CM

## 2021-11-15 DIAGNOSIS — C91.01 ACUTE LYMPHOBLASTIC LEUKEMIA (ALL) IN REMISSION (H): ICD-10-CM

## 2021-11-15 DIAGNOSIS — Z94.81 STATUS POST BONE MARROW TRANSPLANT (H): ICD-10-CM

## 2021-11-15 LAB
ALBUMIN SERPL-MCNC: 3.8 G/DL (ref 3.4–5)
ALP SERPL-CCNC: 82 U/L (ref 40–150)
ALT SERPL W P-5'-P-CCNC: 24 U/L (ref 0–70)
ANION GAP SERPL CALCULATED.3IONS-SCNC: 2 MMOL/L (ref 3–14)
AST SERPL W P-5'-P-CCNC: 17 U/L (ref 0–45)
BASOPHILS # BLD AUTO: 0 10E3/UL (ref 0–0.2)
BASOPHILS NFR BLD AUTO: 0 %
BILIRUB SERPL-MCNC: 0.7 MG/DL (ref 0.2–1.3)
BUN SERPL-MCNC: 44 MG/DL (ref 7–30)
CALCIUM SERPL-MCNC: 10 MG/DL (ref 8.5–10.1)
CHLORIDE BLD-SCNC: 107 MMOL/L (ref 94–109)
CO2 SERPL-SCNC: 27 MMOL/L (ref 20–32)
CREAT SERPL-MCNC: 1.62 MG/DL (ref 0.66–1.25)
EOSINOPHIL # BLD AUTO: 0.2 10E3/UL (ref 0–0.7)
EOSINOPHIL NFR BLD AUTO: 3 %
ERYTHROCYTE [DISTWIDTH] IN BLOOD BY AUTOMATED COUNT: 14 % (ref 10–15)
GFR SERPL CREATININE-BSD FRML MDRD: 45 ML/MIN/1.73M2
GLUCOSE BLD-MCNC: 110 MG/DL (ref 70–99)
HCT VFR BLD AUTO: 32.4 % (ref 40–53)
HGB BLD-MCNC: 10.5 G/DL (ref 13.3–17.7)
IGA SERPL-MCNC: 231 MG/DL (ref 84–499)
IGG SERPL-MCNC: 894 MG/DL (ref 610–1616)
IGM SERPL-MCNC: 88 MG/DL (ref 35–242)
IMM GRANULOCYTES # BLD: 0 10E3/UL
IMM GRANULOCYTES NFR BLD: 0 %
INR PPP: 1.07 (ref 0.85–1.15)
LAB DIRECTOR DISCLAIMER: NORMAL
LAB DIRECTOR INTERPRETATION: NORMAL
LAB DIRECTOR METHODOLOGY: NORMAL
LAB DIRECTOR RESULTS: NORMAL
LYMPHOCYTES # BLD AUTO: 1.8 10E3/UL (ref 0.8–5.3)
LYMPHOCYTES NFR BLD AUTO: 31 %
MCH RBC QN AUTO: 32 PG (ref 26.5–33)
MCHC RBC AUTO-ENTMCNC: 32.4 G/DL (ref 31.5–36.5)
MCV RBC AUTO: 99 FL (ref 78–100)
MONOCYTES # BLD AUTO: 0.7 10E3/UL (ref 0–1.3)
MONOCYTES NFR BLD AUTO: 12 %
NEUTROPHILS # BLD AUTO: 3.2 10E3/UL (ref 1.6–8.3)
NEUTROPHILS NFR BLD AUTO: 54 %
NRBC # BLD AUTO: 0 10E3/UL
NRBC BLD AUTO-RTO: 0 /100
PLATELET # BLD AUTO: 186 10E3/UL (ref 150–450)
POTASSIUM BLD-SCNC: 4.5 MMOL/L (ref 3.4–5.3)
PROT SERPL-MCNC: 7.4 G/DL (ref 6.8–8.8)
RBC # BLD AUTO: 3.28 10E6/UL (ref 4.4–5.9)
SODIUM SERPL-SCNC: 136 MMOL/L (ref 133–144)
SPECIMEN DESCRIPTION: NORMAL
TACROLIMUS BLD-MCNC: 5.5 UG/L (ref 5–15)
TME LAST DOSE: NORMAL H
TME LAST DOSE: NORMAL H
WBC # BLD AUTO: 6 10E3/UL (ref 4–11)

## 2021-11-15 PROCEDURE — 88271 CYTOGENETICS DNA PROBE: CPT | Performed by: PHYSICIAN ASSISTANT

## 2021-11-15 PROCEDURE — 88368 INSITU HYBRIDIZATION MANUAL: CPT | Mod: 26 | Performed by: MEDICAL GENETICS

## 2021-11-15 PROCEDURE — 85610 PROTHROMBIN TIME: CPT

## 2021-11-15 PROCEDURE — 88188 FLOWCYTOMETRY/READ 9-15: CPT | Mod: 26 | Performed by: PATHOLOGY

## 2021-11-15 PROCEDURE — 88237 TISSUE CULTURE BONE MARROW: CPT | Performed by: PHYSICIAN ASSISTANT

## 2021-11-15 PROCEDURE — 81267 CHIMERISM ANAL NO CELL SELEC: CPT | Performed by: PHYSICIAN ASSISTANT

## 2021-11-15 PROCEDURE — 36591 DRAW BLOOD OFF VENOUS DEVICE: CPT

## 2021-11-15 PROCEDURE — 85097 BONE MARROW INTERPRETATION: CPT | Performed by: PATHOLOGY

## 2021-11-15 PROCEDURE — 82040 ASSAY OF SERUM ALBUMIN: CPT

## 2021-11-15 PROCEDURE — 88280 CHROMOSOME KARYOTYPE STUDY: CPT | Performed by: PHYSICIAN ASSISTANT

## 2021-11-15 PROCEDURE — 88311 DECALCIFY TISSUE: CPT | Mod: TC | Performed by: PHYSICIAN ASSISTANT

## 2021-11-15 PROCEDURE — 88311 DECALCIFY TISSUE: CPT | Mod: 26 | Performed by: PATHOLOGY

## 2021-11-15 PROCEDURE — 82784 ASSAY IGA/IGD/IGG/IGM EACH: CPT | Performed by: PHYSICIAN ASSISTANT

## 2021-11-15 PROCEDURE — G0463 HOSPITAL OUTPT CLINIC VISIT: HCPCS

## 2021-11-15 PROCEDURE — 80197 ASSAY OF TACROLIMUS: CPT

## 2021-11-15 PROCEDURE — 88184 FLOWCYTOMETRY/ TC 1 MARKER: CPT | Performed by: PHYSICIAN ASSISTANT

## 2021-11-15 PROCEDURE — 85060 BLOOD SMEAR INTERPRETATION: CPT | Performed by: PATHOLOGY

## 2021-11-15 PROCEDURE — G0452 MOLECULAR PATHOLOGY INTERPR: HCPCS | Performed by: PATHOLOGY

## 2021-11-15 PROCEDURE — 88185 FLOWCYTOMETRY/TC ADD-ON: CPT | Performed by: PHYSICIAN ASSISTANT

## 2021-11-15 PROCEDURE — 250N000011 HC RX IP 250 OP 636: Performed by: PHYSICIAN ASSISTANT

## 2021-11-15 PROCEDURE — 85025 COMPLETE CBC W/AUTO DIFF WBC: CPT | Performed by: PHYSICIAN ASSISTANT

## 2021-11-15 PROCEDURE — 88275 CYTOGENETICS 100-300: CPT | Performed by: PHYSICIAN ASSISTANT

## 2021-11-15 RX ORDER — LIDOCAINE 40 MG/G
CREAM TOPICAL
Status: DISCONTINUED | OUTPATIENT
Start: 2021-11-15 | End: 2021-11-16 | Stop reason: HOSPADM

## 2021-11-15 RX ORDER — PROPOFOL 10 MG/ML
INJECTION, EMULSION INTRAVENOUS PRN
Status: DISCONTINUED | OUTPATIENT
Start: 2021-11-15 | End: 2021-11-15

## 2021-11-15 RX ORDER — ACETAMINOPHEN 325 MG/1
975 TABLET ORAL ONCE
Status: COMPLETED | OUTPATIENT
Start: 2021-11-15 | End: 2021-11-15

## 2021-11-15 RX ORDER — LIDOCAINE HYDROCHLORIDE 10 MG/ML
8-10 INJECTION, SOLUTION EPIDURAL; INFILTRATION; INTRACAUDAL; PERINEURAL
Status: DISCONTINUED | OUTPATIENT
Start: 2021-11-15 | End: 2021-11-16 | Stop reason: HOSPADM

## 2021-11-15 RX ORDER — HEPARIN SODIUM (PORCINE) LOCK FLUSH IV SOLN 100 UNIT/ML 100 UNIT/ML
5 SOLUTION INTRAVENOUS EVERY 8 HOURS
Status: DISCONTINUED | OUTPATIENT
Start: 2021-11-15 | End: 2021-11-15 | Stop reason: HOSPADM

## 2021-11-15 RX ORDER — SODIUM CHLORIDE, SODIUM LACTATE, POTASSIUM CHLORIDE, CALCIUM CHLORIDE 600; 310; 30; 20 MG/100ML; MG/100ML; MG/100ML; MG/100ML
INJECTION, SOLUTION INTRAVENOUS CONTINUOUS
Status: DISCONTINUED | OUTPATIENT
Start: 2021-11-15 | End: 2021-11-16 | Stop reason: HOSPADM

## 2021-11-15 RX ORDER — PROPOFOL 10 MG/ML
INJECTION, EMULSION INTRAVENOUS CONTINUOUS PRN
Status: DISCONTINUED | OUTPATIENT
Start: 2021-11-15 | End: 2021-11-15

## 2021-11-15 RX ORDER — SODIUM CHLORIDE, SODIUM LACTATE, POTASSIUM CHLORIDE, CALCIUM CHLORIDE 600; 310; 30; 20 MG/100ML; MG/100ML; MG/100ML; MG/100ML
500 INJECTION, SOLUTION INTRAVENOUS CONTINUOUS
Status: DISCONTINUED | OUTPATIENT
Start: 2021-11-15 | End: 2021-11-16 | Stop reason: HOSPADM

## 2021-11-15 RX ADMIN — PROPOFOL 50 MG: 10 INJECTION, EMULSION INTRAVENOUS at 11:32

## 2021-11-15 RX ADMIN — PROPOFOL 150 MCG/KG/MIN: 10 INJECTION, EMULSION INTRAVENOUS at 11:31

## 2021-11-15 RX ADMIN — SODIUM CHLORIDE, PRESERVATIVE FREE 5 ML: 5 INJECTION INTRAVENOUS at 09:37

## 2021-11-15 RX ADMIN — PROPOFOL 30 MG: 10 INJECTION, EMULSION INTRAVENOUS at 11:36

## 2021-11-15 RX ADMIN — PROPOFOL 30 MG: 10 INJECTION, EMULSION INTRAVENOUS at 11:39

## 2021-11-15 RX ADMIN — ACETAMINOPHEN 975 MG: 325 TABLET ORAL at 11:14

## 2021-11-15 RX ADMIN — SODIUM CHLORIDE, SODIUM LACTATE, POTASSIUM CHLORIDE, CALCIUM CHLORIDE: 600; 310; 30; 20 INJECTION, SOLUTION INTRAVENOUS at 11:28

## 2021-11-15 ASSESSMENT — PAIN SCALES - GENERAL: PAINLEVEL: NO PAIN (0)

## 2021-11-15 ASSESSMENT — MIFFLIN-ST. JEOR: SCORE: 1874.01

## 2021-11-15 NOTE — LETTER
Date:December 18, 2021      Provider requested that no letter be sent. Do not send.       [facility]

## 2021-11-15 NOTE — PROGRESS NOTES
See op note for details regarding bone marrow biopsy performed 11/15/21.     Socrates De La Torre PA-C  [phone]

## 2021-11-15 NOTE — H&P (VIEW-ONLY)
Patient Name: Nik Nava  Patient MRN: [id number]  Patient : 1960    Abbreviated H&P and Pre-sedation Assessment for bone marrow biopsy and aspirate with sedation    Chief complaint and/or reason for Procedure: s/p BMT for ALL    Planned level of sedation: Minimal - moderate sedation    History of problems with sedation: (patient or family hx): No    ASA Assessment Category: 2 - Mild systemic disease    History of sleep apnea: Yes; does not use CPAP    History of blood thinners: Yes; -ON XARELTO, last dose     Appropriate NPO status: Yes    Current tobacco use: Nicorette gum    Any recent fever, cough, chest or sinus congestion, SOB, weight loss, chest pain, diarrhea or constipation. No    Medications   Current Outpatient Medications   Medication     acyclovir (ZOVIRAX) 800 MG tablet     fluconazole (DIFLUCAN) 200 MG tablet     letermovir (PREVYMIS) 480 MG TABS tablet     levothyroxine (SYNTHROID/LEVOTHROID) 200 MCG tablet     LORazepam (ATIVAN) 1 MG tablet     magnesium oxide (MAG-OX) 400 MG tablet     melatonin 5 MG CAPS     nicotine polacrilex (NICORETTE) 4 MG gum     polyethylene glycol (MIRALAX) 17 GM/Dose powder     tacrolimus (GENERIC EQUIVALENT) 1 MG capsule     triamcinolone (KENALOG) 0.1 % external cream     XARELTO ANTICOAGULANT 20 MG TABS tablet     Current Facility-Administered Medications   Medication     heparin 100 UNIT/ML injection 5 mL         Allergies  Sulfamethoxazole w/trimethoprim    PMH:  Past Medical History:   Diagnosis Date     Cancer (H)     renal cell     CKD (chronic kidney disease)      Thyroid disease        Past Surgical History:   Procedure Laterality Date     BACK SURGERY       BONE MARROW BIOPSY, BONE SPECIMEN, NEEDLE/TROCAR Left 2021    Procedure: BIOPSY, BONE MARROW;  Surgeon: Jailyn Ny, PEE CNP;  Location: [facility] OR     IR CVC TUNNEL PLACEMENT > 5 YRS OF AGE  2021     IR CVC TUNNEL REMOVAL LEFT  2021     COVID neg     Focused  Physical exam pertinent to procedure:          (Details of heart, lung, ASA assessment and mallampati assessment in pre procedure assessment flowsheet)  General- healthy,alert,no distress   Recent vital signs-  /79   Pulse 77   Temp 97.6  F (36.4  C)   Resp 18   Wt 102.5 kg (226 lb)   SpO2 99%   BMI 30.64 kg/m    HEART-regular rate and rhythm and no murmurs, gallops, or rub  LUNGS-Clear to Ausculation  OROPHARYNGEAL - MALLAMPATTI- Class II (visualization of the soft palate, fauces, and uvula)    A/P:Reviewed history, medications, allergies, clinical information and pre procedure assessment. The patient was informed of the risks and benefits of the procedure.  They would like to proceed.  Nik Nava is approved for use of sedation during their procedure as noted above.    Socrates De La Torre PA-C  [phone]

## 2021-11-15 NOTE — LETTER
11/15/2021         RE: Nik Nava  [address] [address] [address]        Dear Colleague,    Thank you for referring your patient, Nik Nava, to the [facility] BLOOD AND MARROW TRANSPLANT PROGRAM [place]. Please see a copy of my visit note below.    See op note for details regarding bone marrow biopsy performed 11/15/21.     Socrates De La Torre PA-C  [phone]      Again, thank you for allowing me to participate in the care of your patient.        Sincerely,         BONE MARROW BIOPSY

## 2021-11-15 NOTE — LETTER
11/15/2021         RE: Nik Nava  [address] [address] [address]        Dear Colleague,    Thank you for referring your patient, Nik Nava, to the [facility] BLOOD AND MARROW TRANSPLANT PROGRAM [place]. Please see a copy of my visit note below.    Patient Name: Nik Nava  Patient MRN: [id number]  Patient : 1960    Abbreviated H&P and Pre-sedation Assessment for bone marrow biopsy and aspirate with sedation    Chief complaint and/or reason for Procedure: s/p BMT for ALL    Planned level of sedation: Minimal - moderate sedation    History of problems with sedation: (patient or family hx): No    ASA Assessment Category: 2 - Mild systemic disease    History of sleep apnea: Yes; does not use CPAP    History of blood thinners: Yes; -ON XARELTO, last dose     Appropriate NPO status: Yes    Current tobacco use: Nicorette gum    Any recent fever, cough, chest or sinus congestion, SOB, weight loss, chest pain, diarrhea or constipation. No    Medications   Current Outpatient Medications   Medication     acyclovir (ZOVIRAX) 800 MG tablet     fluconazole (DIFLUCAN) 200 MG tablet     letermovir (PREVYMIS) 480 MG TABS tablet     levothyroxine (SYNTHROID/LEVOTHROID) 200 MCG tablet     LORazepam (ATIVAN) 1 MG tablet     magnesium oxide (MAG-OX) 400 MG tablet     melatonin 5 MG CAPS     nicotine polacrilex (NICORETTE) 4 MG gum     polyethylene glycol (MIRALAX) 17 GM/Dose powder     tacrolimus (GENERIC EQUIVALENT) 1 MG capsule     triamcinolone (KENALOG) 0.1 % external cream     XARELTO ANTICOAGULANT 20 MG TABS tablet     Current Facility-Administered Medications   Medication     heparin 100 UNIT/ML injection 5 mL         Allergies  Sulfamethoxazole w/trimethoprim    PMH:  Past Medical History:   Diagnosis Date     Cancer (H)     renal cell     CKD (chronic kidney disease)      Thyroid disease        Past Surgical History:   Procedure Laterality Date     BACK SURGERY        BONE MARROW BIOPSY, BONE SPECIMEN, NEEDLE/TROCAR Left 9/2/2021    Procedure: BIOPSY, BONE MARROW;  Surgeon: Jailyn Ny APRN CNP;  Location: [facility] OR     IR CVC TUNNEL PLACEMENT > 5 YRS OF AGE  8/4/2021     IR CVC TUNNEL REMOVAL LEFT  9/2/2021     COVID neg 11/12    Focused Physical exam pertinent to procedure:          (Details of heart, lung, ASA assessment and mallampati assessment in pre procedure assessment flowsheet)  General- healthy,alert,no distress   Recent vital signs-  /79   Pulse 77   Temp 97.6  F (36.4  C)   Resp 18   Wt 102.5 kg (226 lb)   SpO2 99%   BMI 30.64 kg/m    HEART-regular rate and rhythm and no murmurs, gallops, or rub  LUNGS-Clear to Ausculation  OROPHARYNGEAL - MALLAMPATTI- Class II (visualization of the soft palate, fauces, and uvula)    A/P:Reviewed history, medications, allergies, clinical information and pre procedure assessment. The patient was informed of the risks and benefits of the procedure.  They would like to proceed.  Nik Nava is approved for use of sedation during their procedure as noted above.    Socrates De La Torre PA-C  [phone]

## 2021-11-15 NOTE — OP NOTE
"BMT ONC Adult Bone Marrow Biopsy Procedure Note  November 15, 2021  /79   Pulse 77   Temp 97.2  F (36.2  C) (Temporal)   Resp 18   Ht 1.854 m (6' 1\")   Wt 102.5 kg (226 lb)   SpO2 99%   BMI 29.82 kg/m       Learning needs assessment complete within 12 months? YES    DIAGNOSIS: ALL s/p BMT     PROCEDURE: Unilateral Bone Marrow Biopsy and Unilateral Aspirate    LOCATION: [facility] 2nd Floor    Patient s identification was positively verified by verbal identification and invasive procedure safety checklist was completed. Informed consent was obtained. Following the administration of Propofol as pre-medication, patient was placed in the prone position and prepped and draped in a sterile manner. Approximately 10 cc of 1% Lidocaine was used over the left posterior iliac spine. Following this a 3 mm incision was made. Trephine bone marrow core(s) was (were) obtained from the [facility]. Bone marrow aspirates were obtained from the [facility]. Aspirates were sent for morphology, immunophenotyping, cytogenetics and molecular diagnostics. A total of approximately 20 ml of marrow was aspirated. Following this procedure a sterile dressing was applied to the bone marrow biopsy site(s). The patient was placed in the supine position to maintain pressure on the biopsy site. Post-procedure wound care instructions were given.     Complications: NO    Pre-procedural pain: 0 out of 10 on the numeric pain rating scale.     Procedural pain not assessed d/t sedation    Interventions: NO    Length of procedure:20 minutes or less      Procedure performed by: Socrates De La Torre PA-C    "

## 2021-11-15 NOTE — DISCHARGE INSTRUCTIONS
How to Care for your Bone Marrow Biopsy    Activity  Relax and take it easy for the next 24 hours.   Resume regular activity after 24 hours.    Diet   Resume pre-procedure diet and drink plenty of fluids.    If you received sedation, you may feel a little nauseated so start with a clear liquid diet until the nausea passes.    Do Not Immerse Bone Marrow Biopsy Puncture Site in Water  Do not take a bath until the puncture site has healed.  Do not sit in a hot tub or spa until the puncture site has healed.  Do not swim until the puncture site has healed.  Wait 24 hours before taking a shower.    Drainage  Drainage should be minimal.  IF bleeding should occur and soaks through the dressing, lie down and put pressure on the puncture site.    IF bleeding persists, apply gentle pressure with your hand over the dressing for 5 minutes.    IF the pressure doesn't stop the bleeding, contact your provider immediately.    Dressing  Keep the dressing dry and in place for 24 hours, unless instructed otherwise.    IF bleeding soaks through the dressing in the first 24 hours do NOT remove the dressing as you may pull off any scab that has formed.  Instead, reinforce the dressing with extra gauze and tape.    No Alcohol  Do not drink alcoholic beverages for the next 24 hours.    No Driving or Operating Machinery  No driving or operating machinery for the next 24 hours.    Notify your provider IF:    Excessive bleeding or drainage at the puncture site    Excessive swelling, redness or tenderness at the puncture site    Fever above 100.5 degrees taken orally    Severe pain    Drainage that is green, yellow, thick white or has a bad odor    Telephone Numbers  Bone Marrow transplant clinic:  [phone] (Monday thru Friday, 8:00 am to 4:00 pm)  After business hours call the [facility]:  [phone] and ask for the Hematology/BMT doctor on call.  Or call the Emergency Room at the [facility]  [facility]:  [phone].

## 2021-11-15 NOTE — NURSING NOTE
"Oncology Rooming Note    November 15, 2021 10:08 AM   Nik Nava is a 61 year old male who presents for:    Chief Complaint   Patient presents with     Port Draw     power needle. heparin locked, vitals checked     Oncology Clinic Visit     Status post bone marrow transplant (H)     Initial Vitals: /79   Pulse 77   Temp 97.6  F (36.4  C)   Resp 18   Wt 102.5 kg (226 lb)   SpO2 99%   BMI 30.64 kg/m   Estimated body mass index is 30.64 kg/m  as calculated from the following:    Height as of 11/9/21: 1.829 m (6' 0.01\").    Weight as of this encounter: 102.5 kg (226 lb). Body surface area is 2.28 meters squared.  No Pain (0) Comment: Data Unavailable   No LMP for male patient.  Allergies reviewed: Yes  Medications reviewed: Yes    Medications: Medication refills not needed today.  Pharmacy name entered into [employer]:    [facility] - [place], [place] - [address] [facility] [place], [place] - [phone]    Clinical concerns: patient held Tacrolimus and anticoagulant today for lab draws. Patient reports no major changes but has decreased energy and unable to nap during the day.        Missy Waterman LPN November 15, 2021 10:09 AM                "

## 2021-11-15 NOTE — NURSING NOTE
Chief Complaint   Patient presents with     Port Draw     power needle. heparin locked, vitals checked     Stephanie Selas RN on 11/15/2021 at 9:37 AM

## 2021-11-15 NOTE — ANESTHESIA CARE TRANSFER NOTE
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    Anesthesia Type:   MAC     Note:    Oropharynx: oropharynx clear of all foreign objects  Level of Consciousness: awake  Oxygen Supplementation: room air    Independent Airway: airway patency satisfactory and stable  Dentition: dentition unchanged  Vital Signs Stable: post-procedure vital signs reviewed and stable  Report to RN Given: handoff report given  Patient transferred to: Phase II  Comments: Vital signs per nursing documentation.     Handoff Report: Identifed the Patient, Identified the Reponsible Provider, Reviewed the pertinent medical history, Discussed the surgical course, Reviewed Intra-OP anesthesia mangement and issues during anesthesia, Set expectations for post-procedure period and Allowed opportunity for questions and acknowledgement of understanding      Vitals:  Vitals Value Taken Time   BP     Temp     Pulse     Resp     SpO2         Electronically Signed By: PEE Mcgovern CRNA  November 15, 2021  11:55 AM

## 2021-11-15 NOTE — PROGRESS NOTES
Patient Name: Nik Nava  Patient MRN: [id number]  Patient : 1960    Abbreviated H&P and Pre-sedation Assessment for bone marrow biopsy and aspirate with sedation    Chief complaint and/or reason for Procedure: s/p BMT for ALL    Planned level of sedation: Minimal - moderate sedation    History of problems with sedation: (patient or family hx): No    ASA Assessment Category: 2 - Mild systemic disease    History of sleep apnea: Yes; does not use CPAP    History of blood thinners: Yes; -ON XARELTO, last dose     Appropriate NPO status: Yes    Current tobacco use: Nicorette gum    Any recent fever, cough, chest or sinus congestion, SOB, weight loss, chest pain, diarrhea or constipation. No    Medications   Current Outpatient Medications   Medication     acyclovir (ZOVIRAX) 800 MG tablet     fluconazole (DIFLUCAN) 200 MG tablet     letermovir (PREVYMIS) 480 MG TABS tablet     levothyroxine (SYNTHROID/LEVOTHROID) 200 MCG tablet     LORazepam (ATIVAN) 1 MG tablet     magnesium oxide (MAG-OX) 400 MG tablet     melatonin 5 MG CAPS     nicotine polacrilex (NICORETTE) 4 MG gum     polyethylene glycol (MIRALAX) 17 GM/Dose powder     tacrolimus (GENERIC EQUIVALENT) 1 MG capsule     triamcinolone (KENALOG) 0.1 % external cream     XARELTO ANTICOAGULANT 20 MG TABS tablet     Current Facility-Administered Medications   Medication     heparin 100 UNIT/ML injection 5 mL         Allergies  Sulfamethoxazole w/trimethoprim    PMH:  Past Medical History:   Diagnosis Date     Cancer (H)     renal cell     CKD (chronic kidney disease)      Thyroid disease        Past Surgical History:   Procedure Laterality Date     BACK SURGERY       BONE MARROW BIOPSY, BONE SPECIMEN, NEEDLE/TROCAR Left 2021    Procedure: BIOPSY, BONE MARROW;  Surgeon: Jailyn Ny, PEE CNP;  Location: [facility] OR     IR CVC TUNNEL PLACEMENT > 5 YRS OF AGE  2021     IR CVC TUNNEL REMOVAL LEFT  2021     COVID neg     Focused  Physical exam pertinent to procedure:          (Details of heart, lung, ASA assessment and mallampati assessment in pre procedure assessment flowsheet)  General- healthy,alert,no distress   Recent vital signs-  /79   Pulse 77   Temp 97.6  F (36.4  C)   Resp 18   Wt 102.5 kg (226 lb)   SpO2 99%   BMI 30.64 kg/m    HEART-regular rate and rhythm and no murmurs, gallops, or rub  LUNGS-Clear to Ausculation  OROPHARYNGEAL - MALLAMPATTI- Class II (visualization of the soft palate, fauces, and uvula)    A/P:Reviewed history, medications, allergies, clinical information and pre procedure assessment. The patient was informed of the risks and benefits of the procedure.  They would like to proceed.  Nik Nava is approved for use of sedation during their procedure as noted above.    Socrates De La Torre PA-C  [phone]

## 2021-11-15 NOTE — ANESTHESIA POSTPROCEDURE EVALUATION
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    Anesthesia Type:  MAC    Note:  Disposition: Outpatient   Postop Pain Control: Uneventful            Sign Out: Well controlled pain   PONV:    Neuro/Psych: Uneventful            Sign Out: Acceptable/Baseline neuro status   Airway/Respiratory: Uneventful            Sign Out: Acceptable/Baseline resp. status   CV/Hemodynamics: Uneventful            Sign Out: Acceptable CV status; No obvious hypovolemia; No obvious fluid overload   Other NRE:    DID A NON-ROUTINE EVENT OCCUR?            Last vitals:  Vitals Value Taken Time   /77 11/15/21 1206   Temp     Pulse 59 11/15/21 1206   Resp 20 11/15/21 1206   SpO2 99 % 11/15/21 1206       Electronically Signed By: Avel Kim MD  November 15, 2021  12:45 PM

## 2021-11-16 LAB

## 2021-11-18 ENCOUNTER — CARE COORDINATION (OUTPATIENT)
Dept: TRANSPLANT | Facility: CLINIC | Age: 61
End: 2021-11-18

## 2021-11-18 ENCOUNTER — APPOINTMENT (OUTPATIENT)
Dept: LAB | Facility: CLINIC | Age: 61
End: 2021-11-18
Attending: INTERNAL MEDICINE
Payer: COMMERCIAL

## 2021-11-18 ENCOUNTER — OFFICE VISIT (OUTPATIENT)
Dept: TRANSPLANT | Facility: CLINIC | Age: 61
End: 2021-11-18
Attending: INTERNAL MEDICINE
Payer: COMMERCIAL

## 2021-11-18 VITALS
HEART RATE: 87 BPM | RESPIRATION RATE: 16 BRPM | OXYGEN SATURATION: 100 % | SYSTOLIC BLOOD PRESSURE: 126 MMHG | DIASTOLIC BLOOD PRESSURE: 79 MMHG | WEIGHT: 230.2 LBS | BODY MASS INDEX: 30.37 KG/M2 | TEMPERATURE: 97.9 F

## 2021-11-18 DIAGNOSIS — Z94.81 STATUS POST BONE MARROW TRANSPLANT (H): ICD-10-CM

## 2021-11-18 DIAGNOSIS — C91.01 ACUTE LYMPHOBLASTIC LEUKEMIA (ALL) IN REMISSION (H): ICD-10-CM

## 2021-11-18 LAB
ALBUMIN SERPL-MCNC: 3.6 G/DL (ref 3.4–5)
ALP SERPL-CCNC: 88 U/L (ref 40–150)
ALT SERPL W P-5'-P-CCNC: 20 U/L (ref 0–70)
ANION GAP SERPL CALCULATED.3IONS-SCNC: 7 MMOL/L (ref 3–14)
AST SERPL W P-5'-P-CCNC: 18 U/L (ref 0–45)
BASOPHILS # BLD AUTO: 0 10E3/UL (ref 0–0.2)
BASOPHILS NFR BLD AUTO: 0 %
BILIRUB SERPL-MCNC: 0.6 MG/DL (ref 0.2–1.3)
BUN SERPL-MCNC: 36 MG/DL (ref 7–30)
CALCIUM SERPL-MCNC: 9.7 MG/DL (ref 8.5–10.1)
CHLORIDE BLD-SCNC: 108 MMOL/L (ref 94–109)
CO2 SERPL-SCNC: 25 MMOL/L (ref 20–32)
CREAT SERPL-MCNC: 1.53 MG/DL (ref 0.66–1.25)
EOSINOPHIL # BLD AUTO: 0.2 10E3/UL (ref 0–0.7)
EOSINOPHIL NFR BLD AUTO: 3 %
ERYTHROCYTE [DISTWIDTH] IN BLOOD BY AUTOMATED COUNT: 14 % (ref 10–15)
GFR SERPL CREATININE-BSD FRML MDRD: 48 ML/MIN/1.73M2
GLUCOSE BLD-MCNC: 91 MG/DL (ref 70–99)
HCT VFR BLD AUTO: 31.8 % (ref 40–53)
HGB BLD-MCNC: 10.5 G/DL (ref 13.3–17.7)
IMM GRANULOCYTES # BLD: 0 10E3/UL
IMM GRANULOCYTES NFR BLD: 0 %
LYMPHOCYTES # BLD AUTO: 2.1 10E3/UL (ref 0.8–5.3)
LYMPHOCYTES NFR BLD AUTO: 32 %
MAGNESIUM SERPL-MCNC: 1.9 MG/DL (ref 1.6–2.3)
MCH RBC QN AUTO: 32.2 PG (ref 26.5–33)
MCHC RBC AUTO-ENTMCNC: 33 G/DL (ref 31.5–36.5)
MCV RBC AUTO: 98 FL (ref 78–100)
MONOCYTES # BLD AUTO: 0.9 10E3/UL (ref 0–1.3)
MONOCYTES NFR BLD AUTO: 15 %
NEUTROPHILS # BLD AUTO: 3.2 10E3/UL (ref 1.6–8.3)
NEUTROPHILS NFR BLD AUTO: 50 %
NRBC # BLD AUTO: 0 10E3/UL
NRBC BLD AUTO-RTO: 0 /100
PLATELET # BLD AUTO: 201 10E3/UL (ref 150–450)
POTASSIUM BLD-SCNC: 4.4 MMOL/L (ref 3.4–5.3)
PROT SERPL-MCNC: 7.7 G/DL (ref 6.8–8.8)
RBC # BLD AUTO: 3.26 10E6/UL (ref 4.4–5.9)
SODIUM SERPL-SCNC: 140 MMOL/L (ref 133–144)
T4 FREE SERPL-MCNC: 1.33 NG/DL (ref 0.76–1.46)
TSH SERPL DL<=0.005 MIU/L-ACNC: 0.23 MU/L (ref 0.4–4)
WBC # BLD AUTO: 6.5 10E3/UL (ref 4–11)

## 2021-11-18 PROCEDURE — 83735 ASSAY OF MAGNESIUM: CPT | Performed by: INTERNAL MEDICINE

## 2021-11-18 PROCEDURE — 84443 ASSAY THYROID STIM HORMONE: CPT | Performed by: INTERNAL MEDICINE

## 2021-11-18 PROCEDURE — 99214 OFFICE O/P EST MOD 30 MIN: CPT | Performed by: INTERNAL MEDICINE

## 2021-11-18 PROCEDURE — 91300 HC RX IP 250 OP 636: CPT | Performed by: INTERNAL MEDICINE

## 2021-11-18 PROCEDURE — 85004 AUTOMATED DIFF WBC COUNT: CPT | Performed by: INTERNAL MEDICINE

## 2021-11-18 PROCEDURE — 84439 ASSAY OF FREE THYROXINE: CPT | Performed by: INTERNAL MEDICINE

## 2021-11-18 PROCEDURE — 36591 DRAW BLOOD OFF VENOUS DEVICE: CPT | Performed by: INTERNAL MEDICINE

## 2021-11-18 PROCEDURE — 0001A HC ADMIN COVID VAC PFIZER, 1ST DOSE: CPT | Performed by: INTERNAL MEDICINE

## 2021-11-18 PROCEDURE — 82040 ASSAY OF SERUM ALBUMIN: CPT | Performed by: INTERNAL MEDICINE

## 2021-11-18 PROCEDURE — 250N000011 HC RX IP 250 OP 636: Performed by: INTERNAL MEDICINE

## 2021-11-18 PROCEDURE — G0463 HOSPITAL OUTPT CLINIC VISIT: HCPCS | Mod: 25

## 2021-11-18 RX ORDER — LEVOTHYROXINE SODIUM 175 UG/1
175 TABLET ORAL DAILY
Qty: 60 TABLET | Refills: 3 | Status: SHIPPED | OUTPATIENT
Start: 2021-11-18 | End: 2021-11-30

## 2021-11-18 RX ORDER — MAGNESIUM OXIDE 400 MG/1
TABLET ORAL
Qty: 120 TABLET | Refills: 4 | Status: SHIPPED | OUTPATIENT
Start: 2021-11-18 | End: 2021-12-14

## 2021-11-18 RX ORDER — HEPARIN SODIUM (PORCINE) LOCK FLUSH IV SOLN 100 UNIT/ML 100 UNIT/ML
5 SOLUTION INTRAVENOUS
Status: COMPLETED | OUTPATIENT
Start: 2021-11-18 | End: 2021-11-18

## 2021-11-18 RX ADMIN — RNA INGREDIENT BNT-162B2 0.3 ML: 0.23 INJECTION, SUSPENSION INTRAMUSCULAR at 17:44

## 2021-11-18 RX ADMIN — Medication 5 ML: at 16:04

## 2021-11-18 ASSESSMENT — PAIN SCALES - GENERAL: PAINLEVEL: NO PAIN (1)

## 2021-11-18 NOTE — NURSING NOTE
1st dose COVID Pfizer vaccine given intramuscularly in R deltoid. Patient consented, given vaccine information, and observed for 15 minutes post injection. Patient tolerated well. Given vaccine card.    Catherine Chavez RN

## 2021-11-18 NOTE — NURSING NOTE
"Chief Complaint   Patient presents with     Port Draw     labs drawn from port by rn.  vs taken     Port accessed with 20 gauge 3/4\" gripper needle and labs drawn by rn.  Port flushed with NS and heparin then de-accessed.  Pt tolerated well.  VS taken.  Pt checked in for next appt.    Argenis Watkins RN      "

## 2021-11-18 NOTE — LETTER
11/18/2021         RE: Nik Nava  [address] [facility] [id number]        Dear Colleague,    Thank you for referring your patient, Nik Nava, to the [facility]ANS[facility] [place]. Please see a copy of my visit note below.    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  He had mild increase in his rash on his face and his shoulders and upper back which has improved some with increasing triamcinolone cream.  He has had no bleeding bruising or new GI upset.    He still has substantial compromise in his energy and feels worn out most of the day.  He is sleeping more than usual as a consequence.  He has no new respiratory cardiac or ENT symptoms.      Review of Systems                                                                                                                                         8 point Review of systems was o/w negative     PHYSICAL EXAM                                                                                                                                                 KPS: 80  Blood pressure 126/79, pulse 87, temperature 97.9  F (36.6  C), temperature source Oral, resp. rate 16, weight 104.4 kg (230 lb 3.2 oz), SpO2 100 %.    General: NAD  HEENT: sclera anicteric and non-injected. OP moist without lesions.  No lip swelling.  Lungs: CTAB  Heart: RRR  Abd:  + BS. Soft. No tenderness with palpation  Skin: +facial erythema/edema noted.   Mild erythema on upper back.  No blisters/bullae, desquamation. None on arms or trunk.  Neuro: A&O,  EXT: No LE edema   Vascular Access: port in the right chest     KPS=90  Current aGVHD staging:  Skin 1, UGI 0, LGI 0, Liver 0 (10/19/2021)    Labs:  Counts and chemistry ok  BM in CR.    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  Acute lymphoblastic leukemia, [place] chromosome positive in CR, MRD neg.  Here for flu/cy/TBI and sib allo sct  HCT-CI score: 3 (prior solid tumor)  - 9/2/2021 day +21 BMB: Slightly hypocellular marrow (cellularity estimated as 20 to 30%) with trilineage hematopoiesis, less than 1% blasts. No morphologic or immunophenotypic evidence for B lymphoblastic leukemia/lymphoma. BM DNA Chimerism 90% donor,   - VNTR 94% CD33 lineage, 84% CD3 lineage (day 30 PB)     November 15, 2021  bone marrow biopsy is 100% donor with no morphologic or flow cytometric evidence of leukemia BCR abl is pending.    2.  HEME:   - Pt/donor both Opos  - Transfuse for hgb <7g/dL; plt < 10k.  No transfusions needs  -  pulmonary emboli: He developed a pulmonary emboli in the setting of receiving PEG asparaginase.   On Xarelto because this was a provoked thrombus he does not likely need long-term anticoagulation but it is best to continue until his overall clinical course is more settled and is no longer taking active immunosuppression with his potential for vascular compromise.     3.  FEN/Renal: Oral intake good/stable  - Cr stable- running tac lower.  He will likely be ready to begin tapering tacrolimus in 2 or 3 weeks but since he still has a subtle rash it seems unwise to taper at this point.  - Tac induced hypomag, continue 4 tabs/day  - He has a history of renal cancer and resection. has a single kidney.    4.    GVHD:   - On tac 2mg BID, level 5.8 10/26. Level pending today  - skin biopsied 8/30 (back)-subtle interface dermatitis, suggestive of gvhd? Advised to use TMC 2xdaily to back and hydrocortisone daily to face.  Continue TAC-- without tapering for now  5.  ID:   - prophy acv/letrmovir, fluconazole, Pentamidine (10/8)   Letormivir can be discontinued as he is day 100 but continue  acyclovir.  - CMV neg 11/12  - influenza vacc given 10/26.  Covid vaccine given November 18   6. Hx of graves disease; On synthroid; TSH was checked   TSH suppressed so Synthroid reduced to 175 mcg daily     7. CV: Norvasc 5mg daily added back today.    8. GI: omeprazole for heartburn    Dispo:  Continue creams   BP ok off norvasc  cont  omeprazole  ED if sxs of chest pain.      RTC   10 days for visit and labs      40 minutes spent on the date of the encounter doing chart review, review of test results, interpretation of tests, patient visit, documentation and discussion with other provider(s)       Chapo Hill MD    Results for CONNER MCKEON (MRN [id number]) as of 11/18/2021 18:57   Ref. Range 11/15/2021 09:30 11/15/2021 11:40 11/15/2021 11:42 11/18/2021 16:12   Sodium Latest Ref Range: 133 - 144 mmol/L 136   140   Potassium Latest Ref Range: 3.4 - 5.3 mmol/L 4.5   4.4   Chloride Latest Ref Range: 94 - 109 mmol/L 107   108   Carbon Dioxide Latest Ref Range: 20 - 32 mmol/L 27   25   Urea Nitrogen Latest Ref Range: 7 - 30 mg/dL 44 (H)   36 (H)   Creatinine Latest Ref Range: 0.66 - 1.25 mg/dL 1.62 (H)   1.53 (H)   GFR Estimate Latest Ref Range: >60 mL/min/1.73m2 45 (L)   48 (L)   Calcium Latest Ref Range: 8.5 - 10.1 mg/dL 10.0   9.7   Anion Gap Latest Ref Range: 3 - 14 mmol/L 2 (L)   7   Magnesium Latest Ref Range: 1.6 - 2.3 mg/dL    1.9   Albumin Latest Ref Range: 3.4 - 5.0 g/dL 3.8   3.6   Protein Total Latest Ref Range: 6.8 - 8.8 g/dL 7.4   7.7   Bilirubin Total Latest Ref Range: 0.2 - 1.3 mg/dL 0.7   0.6   Alkaline Phosphatase Latest Ref Range: 40 - 150 U/L 82   88   ALT Latest Ref Range: 0 - 70 U/L 24   20   AST Latest Ref Range: 0 - 45 U/L 17   18   T4 Free Latest Ref Range: 0.76 - 1.46 ng/dL    1.33   TSH Latest Ref Range: 0.40 - 4.00 mU/L    0.23 (L)   Glucose Latest Ref Range: 70 - 99 mg/dL 110 (H)   91   WBC Latest Ref Range: 4.0 - 11.0 10e3/uL 6.0   6.5   Hemoglobin Latest Ref Range: 13.3 -  17.7 g/dL 10.5 (L)   10.5 (L)   Hematocrit Latest Ref Range: 40.0 - 53.0 % 32.4 (L)   31.8 (L)   Platelet Count Latest Ref Range: 150 - 450 10e3/uL 186   201   RBC Count Latest Ref Range: 4.40 - 5.90 10e6/uL 3.28 (L)   3.26 (L)   MCV Latest Ref Range: 78 - 100 fL 99   98   MCH Latest Ref Range: 26.5 - 33.0 pg 32.0   32.2   MCHC Latest Ref Range: 31.5 - 36.5 g/dL 32.4   33.0   RDW Latest Ref Range: 10.0 - 15.0 % 14.0   14.0   % Neutrophils Latest Units: % 54   50   % Lymphocytes Latest Units: % 31   32   % Monocytes Latest Units: % 12   15   % Eosinophils Latest Units: % 3   3   % Basophils Latest Units: % 0   0   Absolute Basophils Latest Ref Range: 0.0 - 0.2 10e3/uL 0.0   0.0   Absolute Eosinophils Latest Ref Range: 0.0 - 0.7 10e3/uL 0.2   0.2   Absolute Immature Granulocytes Latest Ref Range: <=0.0 10e3/uL 0.0   0.0   Absolute Lymphocytes Latest Ref Range: 0.8 - 5.3 10e3/uL 1.8   2.1   Absolute Monocytes Latest Ref Range: 0.0 - 1.3 10e3/uL 0.7   0.9   % Immature Granulocytes Latest Units: % 0   0   Absolute Neutrophils Latest Ref Range: 1.6 - 8.3 10e3/uL 3.2   3.2   Absolute NRBCs Latest Units: 10e3/uL 0.0   0.0   NRBCs per 100 WBC Latest Ref Range: <1 /100 0   0   INR Latest Ref Range: 0.85 - 1.15  1.07      Tacrolimus Last Dose Date Unknown 11/14/2021      Tacrolimus Last Dose Time Unknown See Comment      Tacrolimus by Tandem Mass Spectrometry Latest Ref Range: 5.0 - 15.0 ug/L 5.5      CHROMOSOME ANALYSIS, BONE MARROW, DIAGNOSIS/RELAPSE Unknown   Rpt    DNA MARKER POST BMT ENGRAFTMENT BONE MARROW Unknown   Attch    FISH Unknown   Rpt    IGA Latest Ref Range: 84 - 499 mg/dL 231      IGG Latest Ref Range: 610-1,616 mg/dL 894      IGM Latest Ref Range: 35 - 242 mg/dL 88      FLOW CYTOMETRY Unknown   Attch    DISCLAIMER Unknown   This result conta...    BONE MARROW BIOPSY Unknown  Attch     Performing Labs Unknown  This result conta... This result conta...    INTERPRETATION Unknown   This result conta...     METHODOLOGY Unknown   This result conta...    RESULTS Unknown   This result conta...                  Again, thank you for allowing me to participate in the care of your patient.        Sincerely,        Chapo Hill MD

## 2021-11-18 NOTE — NURSING NOTE
"Oncology Rooming Note    November 18, 2021 4:20 PM   Nik Nava is a 61 year old male who presents for:    Chief Complaint   Patient presents with     Port Draw     labs drawn from port by rn.  vs taken     RECHECK     provider visit r/t ALL     Initial Vitals: /79 (BP Location: Right arm, Patient Position: Sitting, Cuff Size: Adult Large)   Pulse 87   Temp 97.9  F (36.6  C) (Oral)   Resp 16   Wt 104.4 kg (230 lb 3.2 oz)   SpO2 100%   BMI 30.37 kg/m   Estimated body mass index is 30.37 kg/m  as calculated from the following:    Height as of 11/15/21: 1.854 m (6' 1\").    Weight as of this encounter: 104.4 kg (230 lb 3.2 oz). Body surface area is 2.32 meters squared.  No Pain (1) Comment: Data Unavailable   No LMP for male patient.  Allergies reviewed: Yes  Medications reviewed: Yes    Medications: Magnesium and Fluconazole needed today. Please send refills downstairs to pharmacy.       Pharmacy name entered into [facility]:      [place], [place] - [address] [place] [phone] [phone]    Clinical concerns: VSS. No clinical concerns.        Bonnie Ortiz RN              "

## 2021-11-18 NOTE — PROGRESS NOTES
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  He had mild increase in his rash on his face and his shoulders and upper back which has improved some with increasing triamcinolone cream.  He has had no bleeding bruising or new GI upset.    He still has substantial compromise in his energy and feels worn out most of the day.  He is sleeping more than usual as a consequence.  He has no new respiratory cardiac or ENT symptoms.      Review of Systems                                                                                                                                         8 point Review of systems was o/w negative     PHYSICAL EXAM                                                                                                                                                 KPS: 80  Blood pressure 126/79, pulse 87, temperature 97.9  F (36.6  C), temperature source Oral, resp. rate 16, weight 104.4 kg (230 lb 3.2 oz), SpO2 100 %.    General: NAD  HEENT: sclera anicteric and non-injected. OP moist without lesions.  No lip swelling.  Lungs: CTAB  Heart: RRR  Abd:  + BS. Soft. No tenderness with palpation  Skin: +facial erythema/edema noted.   Mild erythema on upper back.  No blisters/bullae, desquamation. None on arms or trunk.  Neuro: A&O,  EXT: No LE edema   Vascular Access: port in the right chest     KPS=90  Current aGVHD staging:  Skin 1, UGI 0, LGI 0, Liver 0 (10/19/2021)    Labs:  Counts and chemistry ok  BM in CR.    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  Acute lymphoblastic leukemia, [place] chromosome positive in CR, MRD neg.  Here for flu/cy/TBI and sib allo sct  HCT-CI score: 3 (prior  solid tumor)  - 9/2/2021 day +21 BMB: Slightly hypocellular marrow (cellularity estimated as 20 to 30%) with trilineage hematopoiesis, less than 1% blasts. No morphologic or immunophenotypic evidence for B lymphoblastic leukemia/lymphoma. BM DNA Chimerism 90% donor,   - VNTR 94% CD33 lineage, 84% CD3 lineage (day 30 PB)     November 15, 2021  bone marrow biopsy is 100% donor with no morphologic or flow cytometric evidence of leukemia BCR abl is pending.    2.  HEME:   - Pt/donor both Opos  - Transfuse for hgb <7g/dL; plt < 10k.  No transfusions needs  -  pulmonary emboli: He developed a pulmonary emboli in the setting of receiving PEG asparaginase.   On Xarelto because this was a provoked thrombus he does not likely need long-term anticoagulation but it is best to continue until his overall clinical course is more settled and is no longer taking active immunosuppression with his potential for vascular compromise.     3.  FEN/Renal: Oral intake good/stable  - Cr stable- running tac lower.  He will likely be ready to begin tapering tacrolimus in 2 or 3 weeks but since he still has a subtle rash it seems unwise to taper at this point.  - Tac induced hypomag, continue 4 tabs/day  - He has a history of renal cancer and resection. has a single kidney.    4.    GVHD:   - On tac 2mg BID, level 5.8 10/26. Level pending today  - skin biopsied 8/30 (back)-subtle interface dermatitis, suggestive of gvhd? Advised to use TMC 2xdaily to back and hydrocortisone daily to face.  Continue TAC-- without tapering for now  5.  ID:   - prophy acv/letrmovir, fluconazole, Pentamidine (10/8)   Letormivir can be discontinued as he is day 100 but continue acyclovir.  - CMV neg 11/12  - influenza vacc given 10/26.  Covid vaccine given November 18   6. Hx of graves disease; On synthroid; TSH was checked   TSH suppressed so Synthroid reduced to 175 mcg daily     7. CV: Norvasc 5mg daily added back today.    8. GI: omeprazole for  heartburn    Dispo:  Continue creams   BP ok off norvasc  cont  omeprazole  ED if sxs of chest pain.      RTC   10 days for visit and labs      40 minutes spent on the date of the encounter doing chart review, review of test results, interpretation of tests, patient visit, documentation and discussion with other provider(s)       Chapo Hill MD    Results for CONNER MCKEON (MRN [id number]) as of 11/18/2021 18:57   Ref. Range 11/15/2021 09:30 11/15/2021 11:40 11/15/2021 11:42 11/18/2021 16:12   Sodium Latest Ref Range: 133 - 144 mmol/L 136   140   Potassium Latest Ref Range: 3.4 - 5.3 mmol/L 4.5   4.4   Chloride Latest Ref Range: 94 - 109 mmol/L 107   108   Carbon Dioxide Latest Ref Range: 20 - 32 mmol/L 27   25   Urea Nitrogen Latest Ref Range: 7 - 30 mg/dL 44 (H)   36 (H)   Creatinine Latest Ref Range: 0.66 - 1.25 mg/dL 1.62 (H)   1.53 (H)   GFR Estimate Latest Ref Range: >60 mL/min/1.73m2 45 (L)   48 (L)   Calcium Latest Ref Range: 8.5 - 10.1 mg/dL 10.0   9.7   Anion Gap Latest Ref Range: 3 - 14 mmol/L 2 (L)   7   Magnesium Latest Ref Range: 1.6 - 2.3 mg/dL    1.9   Albumin Latest Ref Range: 3.4 - 5.0 g/dL 3.8   3.6   Protein Total Latest Ref Range: 6.8 - 8.8 g/dL 7.4   7.7   Bilirubin Total Latest Ref Range: 0.2 - 1.3 mg/dL 0.7   0.6   Alkaline Phosphatase Latest Ref Range: 40 - 150 U/L 82   88   ALT Latest Ref Range: 0 - 70 U/L 24   20   AST Latest Ref Range: 0 - 45 U/L 17   18   T4 Free Latest Ref Range: 0.76 - 1.46 ng/dL    1.33   TSH Latest Ref Range: 0.40 - 4.00 mU/L    0.23 (L)   Glucose Latest Ref Range: 70 - 99 mg/dL 110 (H)   91   WBC Latest Ref Range: 4.0 - 11.0 10e3/uL 6.0   6.5   Hemoglobin Latest Ref Range: 13.3 - 17.7 g/dL 10.5 (L)   10.5 (L)   Hematocrit Latest Ref Range: 40.0 - 53.0 % 32.4 (L)   31.8 (L)   Platelet Count Latest Ref Range: 150 - 450 10e3/uL 186   201   RBC Count Latest Ref Range: 4.40 - 5.90 10e6/uL 3.28 (L)   3.26 (L)   MCV Latest Ref Range: 78 - 100 fL 99   98    MCH Latest Ref Range: 26.5 - 33.0 pg 32.0   32.2   MCHC Latest Ref Range: 31.5 - 36.5 g/dL 32.4   33.0   RDW Latest Ref Range: 10.0 - 15.0 % 14.0   14.0   % Neutrophils Latest Units: % 54   50   % Lymphocytes Latest Units: % 31   32   % Monocytes Latest Units: % 12   15   % Eosinophils Latest Units: % 3   3   % Basophils Latest Units: % 0   0   Absolute Basophils Latest Ref Range: 0.0 - 0.2 10e3/uL 0.0   0.0   Absolute Eosinophils Latest Ref Range: 0.0 - 0.7 10e3/uL 0.2   0.2   Absolute Immature Granulocytes Latest Ref Range: <=0.0 10e3/uL 0.0   0.0   Absolute Lymphocytes Latest Ref Range: 0.8 - 5.3 10e3/uL 1.8   2.1   Absolute Monocytes Latest Ref Range: 0.0 - 1.3 10e3/uL 0.7   0.9   % Immature Granulocytes Latest Units: % 0   0   Absolute Neutrophils Latest Ref Range: 1.6 - 8.3 10e3/uL 3.2   3.2   Absolute NRBCs Latest Units: 10e3/uL 0.0   0.0   NRBCs per 100 WBC Latest Ref Range: <1 /100 0   0   INR Latest Ref Range: 0.85 - 1.15  1.07      Tacrolimus Last Dose Date Unknown 11/14/2021      Tacrolimus Last Dose Time Unknown See Comment      Tacrolimus by Tandem Mass Spectrometry Latest Ref Range: 5.0 - 15.0 ug/L 5.5      CHROMOSOME ANALYSIS, BONE MARROW, DIAGNOSIS/RELAPSE Unknown   Rpt    DNA MARKER POST BMT ENGRAFTMENT BONE MARROW Unknown   Attch    FISH Unknown   Rpt    IGA Latest Ref Range: 84 - 499 mg/dL 231      IGG Latest Ref Range: 610-1,616 mg/dL 894      IGM Latest Ref Range: 35 - 242 mg/dL 88      FLOW CYTOMETRY Unknown   Attch    DISCLAIMER Unknown   This result conta...    BONE MARROW BIOPSY Unknown  Attch     Performing Labs Unknown  This result conta... This result conta...    INTERPRETATION Unknown   This result conta...    METHODOLOGY Unknown   This result conta...    RESULTS Unknown   This result conta...

## 2021-11-18 NOTE — LETTER
Date:December 20, 2021      Provider requested that no letter be sent. Do not send.       [facility]

## 2021-11-18 NOTE — LETTER
Date:December 20, 2021      Provider requested that no letter be sent. Do not send.       [facility]

## 2021-11-18 NOTE — LETTER
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  He had mild increase in his rash on his face and his shoulders and upper back which has improved some with increasing triamcinolone cream.  He has had no bleeding bruising or new GI upset.    He still has substantial compromise in his energy and feels worn out most of the day.  He is sleeping more than usual as a consequence.  He has no new respiratory cardiac or ENT symptoms.      Review of Systems                                                                                                                                         8 point Review of systems was o/w negative     PHYSICAL EXAM                                                                                                                                                 KPS: 80  Blood pressure 126/79, pulse 87, temperature 97.9  F (36.6  C), temperature source Oral, resp. rate 16, weight 104.4 kg (230 lb 3.2 oz), SpO2 100 %.    General: NAD  HEENT: sclera anicteric and non-injected. OP moist without lesions.  No lip swelling.  Lungs: CTAB  Heart: RRR  Abd:  + BS. Soft. No tenderness with palpation  Skin: +facial erythema/edema noted.   Mild erythema on upper back.  No blisters/bullae, desquamation. None on arms or trunk.  Neuro: A&O,  EXT: No LE edema   Vascular Access: port in the right chest     KPS=90  Current aGVHD staging:  Skin 1, UGI 0, LGI 0, Liver 0 (10/19/2021)    Labs:  Counts and chemistry ok  BM in CR.    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  Here for flu/cy/TBI and sib allo sct  HCT-CI score: 3 (prior solid tumor)  - 9/2/2021 day +21 BMB: Slightly hypocellular marrow (cellularity estimated as 20 to 30%) with trilineage hematopoiesis, less than 1% blasts. No morphologic or immunophenotypic evidence for B lymphoblastic leukemia/lymphoma. BM DNA Chimerism 90% donor,   - VNTR 94% CD33 lineage, 84% CD3 lineage (day 30 PB)     November 15, 2021  bone marrow biopsy is 100% donor with no morphologic or flow cytometric evidence of leukemia BCR abl is pending.    2.  HEME:   - Pt/donor both Opos  - Transfuse for hgb <7g/dL; plt < 10k.  No transfusions needs  -  pulmonary emboli: He developed a pulmonary emboli in the setting of receiving PEG asparaginase.   On Xarelto because this was a provoked thrombus he does not likely need long-term anticoagulation but it is best to continue until his overall clinical course is more settled and is no longer taking active immunosuppression with his potential for vascular compromise.     3.  FEN/Renal: Oral intake good/stable  - Cr stable- running tac lower.  He will likely be ready to begin tapering tacrolimus in 2 or 3 weeks but since he still has a subtle rash it seems unwise to taper at this point.  - Tac induced hypomag, continue 4 tabs/day  - He has a history of renal cancer and resection. has a single kidney.    4.    GVHD:   - On tac 2mg BID, level 5.8 10/26. Level pending today  - skin biopsied 8/30 (back)-subtle interface dermatitis, suggestive of gvhd? Advised to use TMC 2xdaily to back and hydrocortisone daily to face.  Continue TAC-- without tapering for now  5.  ID:   - prophy acv/letrmovir, fluconazole, Pentamidine (10/8)   Letormivir can be discontinued as he is day 100 but continue acyclovir.  - CMV neg 11/12  - influenza vacc given 10/26.  Covid vaccine given November 18   6. Hx of graves disease; On synthroid; TSH was checked   TSH suppressed so Synthroid reduced to 175 mcg  daily     7. CV: Norvasc 5mg daily added back today.    8. GI: omeprazole for heartburn    Dispo:  Continue creams   BP ok off norvasc  cont  omeprazole  ED if sxs of chest pain.      RTC   10 days for visit and labs      40 minutes spent on the date of the encounter doing chart review, review of test results, interpretation of tests, patient visit, documentation and discussion with other provider(s)       Chapo Hill MD    Results for CONNER MCKEON (MRN [id number]) as of 11/18/2021 18:57   Ref. Range 11/15/2021 09:30 11/15/2021 11:40 11/15/2021 11:42 11/18/2021 16:12   Sodium Latest Ref Range: 133 - 144 mmol/L 136   140   Potassium Latest Ref Range: 3.4 - 5.3 mmol/L 4.5   4.4   Chloride Latest Ref Range: 94 - 109 mmol/L 107   108   Carbon Dioxide Latest Ref Range: 20 - 32 mmol/L 27   25   Urea Nitrogen Latest Ref Range: 7 - 30 mg/dL 44 (H)   36 (H)   Creatinine Latest Ref Range: 0.66 - 1.25 mg/dL 1.62 (H)   1.53 (H)   GFR Estimate Latest Ref Range: >60 mL/min/1.73m2 45 (L)   48 (L)   Calcium Latest Ref Range: 8.5 - 10.1 mg/dL 10.0   9.7   Anion Gap Latest Ref Range: 3 - 14 mmol/L 2 (L)   7   Magnesium Latest Ref Range: 1.6 - 2.3 mg/dL    1.9   Albumin Latest Ref Range: 3.4 - 5.0 g/dL 3.8   3.6   Protein Total Latest Ref Range: 6.8 - 8.8 g/dL 7.4   7.7   Bilirubin Total Latest Ref Range: 0.2 - 1.3 mg/dL 0.7   0.6   Alkaline Phosphatase Latest Ref Range: 40 - 150 U/L 82   88   ALT Latest Ref Range: 0 - 70 U/L 24   20   AST Latest Ref Range: 0 - 45 U/L 17   18   T4 Free Latest Ref Range: 0.76 - 1.46 ng/dL    1.33   TSH Latest Ref Range: 0.40 - 4.00 mU/L    0.23 (L)   Glucose Latest Ref Range: 70 - 99 mg/dL 110 (H)   91   WBC Latest Ref Range: 4.0 - 11.0 10e3/uL 6.0   6.5   Hemoglobin Latest Ref Range: 13.3 - 17.7 g/dL 10.5 (L)   10.5 (L)   Hematocrit Latest Ref Range: 40.0 - 53.0 % 32.4 (L)   31.8 (L)   Platelet Count Latest Ref Range: 150 - 450 10e3/uL 186   201   RBC Count Latest Ref Range: 4.40 -  5.90 10e6/uL 3.28 (L)   3.26 (L)   MCV Latest Ref Range: 78 - 100 fL 99   98   MCH Latest Ref Range: 26.5 - 33.0 pg 32.0   32.2   MCHC Latest Ref Range: 31.5 - 36.5 g/dL 32.4   33.0   RDW Latest Ref Range: 10.0 - 15.0 % 14.0   14.0   % Neutrophils Latest Units: % 54   50   % Lymphocytes Latest Units: % 31   32   % Monocytes Latest Units: % 12   15   % Eosinophils Latest Units: % 3   3   % Basophils Latest Units: % 0   0   Absolute Basophils Latest Ref Range: 0.0 - 0.2 10e3/uL 0.0   0.0   Absolute Eosinophils Latest Ref Range: 0.0 - 0.7 10e3/uL 0.2   0.2   Absolute Immature Granulocytes Latest Ref Range: <=0.0 10e3/uL 0.0   0.0   Absolute Lymphocytes Latest Ref Range: 0.8 - 5.3 10e3/uL 1.8   2.1   Absolute Monocytes Latest Ref Range: 0.0 - 1.3 10e3/uL 0.7   0.9   % Immature Granulocytes Latest Units: % 0   0   Absolute Neutrophils Latest Ref Range: 1.6 - 8.3 10e3/uL 3.2   3.2   Absolute NRBCs Latest Units: 10e3/uL 0.0   0.0   NRBCs per 100 WBC Latest Ref Range: <1 /100 0   0   INR Latest Ref Range: 0.85 - 1.15  1.07      Tacrolimus Last Dose Date Unknown 11/14/2021      Tacrolimus Last Dose Time Unknown See Comment      Tacrolimus by Tandem Mass Spectrometry Latest Ref Range: 5.0 - 15.0 ug/L 5.5      CHROMOSOME ANALYSIS, BONE MARROW, DIAGNOSIS/RELAPSE Unknown   Rpt    DNA MARKER POST BMT ENGRAFTMENT BONE MARROW Unknown   Attch    FISH Unknown   Rpt    IGA Latest Ref Range: 84 - 499 mg/dL 231      IGG Latest Ref Range: 610-1,616 mg/dL 894      IGM Latest Ref Range: 35 - 242 mg/dL 88      FLOW CYTOMETRY Unknown   Attch    DISCLAIMER Unknown   This result conta...    BONE MARROW BIOPSY Unknown  Attch     Performing Labs Unknown  This result conta... This result conta...    INTERPRETATION Unknown   This result conta...    METHODOLOGY Unknown   This result conta...    RESULTS Unknown   This result conta...                    Chapo Hill MD

## 2021-11-19 LAB — INTERPRETATION: NORMAL

## 2021-11-22 LAB
CULTURE HARVEST COMPLETE DATE: NORMAL

## 2021-11-22 RX ORDER — AMLODIPINE BESYLATE 5 MG/1
5 TABLET ORAL DAILY
Qty: 90 TABLET | Refills: 3 | COMMUNITY
Start: 2021-11-22 | End: 2021-11-30

## 2021-11-24 LAB — INTERPRETATION: NORMAL

## 2021-11-29 NOTE — PROGRESS NOTES
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 Feeling really pretty well. Rash is stable, only using steroid creams 1 time per day on his back and twice daily on his face. No real new areas of involvement or other symptoms of GVHD. Energy is still recovering. Weight is stable.       Review of Systems                                                                                                                                         8 point Review of systems was o/w negative     PHYSICAL EXAM                                                                                                                                                 KPS: 80  Blood pressure 128/70, pulse 68, temperature 97  F (36.1  C), temperature source Tympanic, resp. rate 16, weight 104.5 kg (230 lb 6.4 oz), SpO2 99 %.    Wt Readings from Last 5 Encounters:   11/30/21 104.5 kg (230 lb 6.4 oz)   11/18/21 104.4 kg (230 lb 3.2 oz)   11/15/21 102.5 kg (226 lb)   11/15/21 102.5 kg (226 lb)   11/09/21 105.2 kg (231 lb 14.4 oz)       General: NAD  HEENT: sclera anicteric and non-injected. OP moist without lesions.  No lip swelling.  Lungs: CTAB  Heart: RRR  Abd:  + BS. Soft. No tenderness with palpation  Skin: +facial erythema and slight rashy appearance. Faint macular papular appearance to upper back. No other rashes or petechiae.   Neuro: A&O x3  EXT: No LE edema   Vascular Access: port in the right chest     KPS=90  Current aGVHD staging:  Skin 1, UGI 0, LGI 0, Liver 0 (11/30/2021)    Labs:  Lab Results   Component Value Date    WBC 5.9 11/30/2021    ANEU 3.5 10/26/2021    HGB 10.4 (L) 11/30/2021    HCT 31.7 (L) 11/30/2021     11/30/2021     11/30/2021    POTASSIUM 4.5 11/30/2021    CHLORIDE 106 11/30/2021    CO2 26 11/30/2021     (H) 11/30/2021    BUN 36 (H) 11/30/2021    CR 1.31 (H) 11/30/2021    MAG 2.1 11/30/2021    INR 1.07  11/15/2021    BILITOTAL 0.5 11/30/2021    AST 21 11/30/2021    ALT 22 11/30/2021    ALKPHOS 86 11/30/2021    PROTTOTAL 7.3 11/30/2021    ALBUMIN 3.5 11/30/2021       I have assessed all abnormal lab values for their clinical significance and any values considered clinically significant have been addressed in the assessment and plan.        ASSESSMENT AND PLAN   Nik Nava is a 61 year old male with Ph Pos ALL, day 112 s/p sib allo stem cell transplant    Day -6 (8/4): flu/cy  Day -5 through day -2 (8/5-8/8): flu  Day -1 (8/9): TBI  Day 0 (8/10): transplant     1.  Acute lymphoblastic leukemia, [place] chromosome positive in CR, MRD neg. S/p allo sib PBSCT. (ABO matched)  HCT-CI score: 3 (prior solid tumor)  Day +100 bone marrow biopsy is 100% donor with no morphologic or flow cytometric evidence of leukemia BCR abl is pending.    2.  HEME:   - Transfuse for hgb <7g/dL; plt < 10k.  No transfusions needs  -  pulmonary emboli: He developed a pulmonary emboli in the setting of receiving PEG asparaginase. On Xarelto because this was a provoked thrombus. Continue for anticoagulation for now.       3.  FEN/Renal: Oral intake good/stable  - Cr improved on lower tac.     - Tac induced hypomag, continue 4 tabs/day  - He has a history of renal cancer and resection. has a single kidney.    4.    GVHD:   - On tac 2mg BID, level 5.5 11/15. Level pending today. Plan to taper tac in the next 1-2 weeks pending skin rash. Overall skin rash is pretty minimal and pt is only using steroid cream 1-2 times per day. Re-consider starting taper next week pending eval.   - skin biopsied 8/30 (back)-subtle interface dermatitis, suggestive of gvhd?     5.  ID:   - prophy acv (letrmovir stopped at day +100), fluconazole, Pentamidine (10/8)- overdue for pentamidine- requested this ASAP in the PFT lab.    - CMV neg 11/12- repeat pending.   - influenza vacc given 10/26.  Covid vaccine given November 18- give second dose at next visit.       6. Hx of graves disease; On synthroid; TSH suppressed so Synthroid reduced to 175 mcg daily     7. CV: Norvasc 5mg daily added back today.    8. GI: omeprazole for heartburn      RTC: ASAP for inhaled pentamidine in PFT lab.   7-10 days for visit and labs to re-assess rash and ongoing discussion regarding tac taper.     30 minutes spent on the date of the encounter doing chart review, review of test results, interpretation of tests, patient visit, documentation and discussion with other provider(s)      Socrates De La Torre PA-C  [phone]

## 2021-11-30 ENCOUNTER — ONCOLOGY VISIT (OUTPATIENT)
Dept: TRANSPLANT | Facility: CLINIC | Age: 61
End: 2021-11-30
Attending: PHYSICIAN ASSISTANT
Payer: COMMERCIAL

## 2021-11-30 ENCOUNTER — APPOINTMENT (OUTPATIENT)
Dept: LAB | Facility: CLINIC | Age: 61
End: 2021-11-30
Attending: PHYSICIAN ASSISTANT
Payer: COMMERCIAL

## 2021-11-30 VITALS
HEART RATE: 68 BPM | RESPIRATION RATE: 16 BRPM | BODY MASS INDEX: 30.4 KG/M2 | OXYGEN SATURATION: 99 % | WEIGHT: 230.4 LBS | SYSTOLIC BLOOD PRESSURE: 128 MMHG | TEMPERATURE: 97 F | DIASTOLIC BLOOD PRESSURE: 70 MMHG

## 2021-11-30 DIAGNOSIS — C91.01 ACUTE LYMPHOBLASTIC LEUKEMIA (ALL) IN REMISSION (H): ICD-10-CM

## 2021-11-30 DIAGNOSIS — Z94.81 STATUS POST BONE MARROW TRANSPLANT (H): Primary | ICD-10-CM

## 2021-11-30 LAB
ALBUMIN SERPL-MCNC: 3.5 G/DL (ref 3.4–5)
ALP SERPL-CCNC: 86 U/L (ref 40–150)
ALT SERPL W P-5'-P-CCNC: 22 U/L (ref 0–70)
ANION GAP SERPL CALCULATED.3IONS-SCNC: 8 MMOL/L (ref 3–14)
AST SERPL W P-5'-P-CCNC: 21 U/L (ref 0–45)
BASOPHILS # BLD AUTO: 0 10E3/UL (ref 0–0.2)
BASOPHILS NFR BLD AUTO: 1 %
BILIRUB SERPL-MCNC: 0.5 MG/DL (ref 0.2–1.3)
BUN SERPL-MCNC: 36 MG/DL (ref 7–30)
CALCIUM SERPL-MCNC: 10 MG/DL (ref 8.5–10.1)
CHLORIDE BLD-SCNC: 106 MMOL/L (ref 94–109)
CMV DNA SPEC NAA+PROBE-ACNC: NOT DETECTED IU/ML
CO2 SERPL-SCNC: 26 MMOL/L (ref 20–32)
CREAT SERPL-MCNC: 1.31 MG/DL (ref 0.66–1.25)
EOSINOPHIL # BLD AUTO: 0.2 10E3/UL (ref 0–0.7)
EOSINOPHIL NFR BLD AUTO: 4 %
ERYTHROCYTE [DISTWIDTH] IN BLOOD BY AUTOMATED COUNT: 13.1 % (ref 10–15)
GFR SERPL CREATININE-BSD FRML MDRD: 58 ML/MIN/1.73M2
GLUCOSE BLD-MCNC: 105 MG/DL (ref 70–99)
HCT VFR BLD AUTO: 31.7 % (ref 40–53)
HGB BLD-MCNC: 10.4 G/DL (ref 13.3–17.7)
IMM GRANULOCYTES # BLD: 0 10E3/UL
IMM GRANULOCYTES NFR BLD: 0 %
LAB DIRECTOR COMMENTS: NORMAL
LAB DIRECTOR DISCLAIMER: NORMAL
LAB DIRECTOR INTERPRETATION: NORMAL
LAB DIRECTOR METHODOLOGY: NORMAL
LAB DIRECTOR RESULTS: NORMAL
LYMPHOCYTES # BLD AUTO: 1.6 10E3/UL (ref 0.8–5.3)
LYMPHOCYTES NFR BLD AUTO: 28 %
MAGNESIUM SERPL-MCNC: 2.1 MG/DL (ref 1.6–2.3)
MCH RBC QN AUTO: 32.4 PG (ref 26.5–33)
MCHC RBC AUTO-ENTMCNC: 32.8 G/DL (ref 31.5–36.5)
MCV RBC AUTO: 99 FL (ref 78–100)
MONOCYTES # BLD AUTO: 0.7 10E3/UL (ref 0–1.3)
MONOCYTES NFR BLD AUTO: 12 %
NEUTROPHILS # BLD AUTO: 3.2 10E3/UL (ref 1.6–8.3)
NEUTROPHILS NFR BLD AUTO: 55 %
NRBC # BLD AUTO: 0 10E3/UL
NRBC BLD AUTO-RTO: 0 /100
PLATELET # BLD AUTO: 194 10E3/UL (ref 150–450)
POTASSIUM BLD-SCNC: 4.5 MMOL/L (ref 3.4–5.3)
PROT SERPL-MCNC: 7.3 G/DL (ref 6.8–8.8)
RBC # BLD AUTO: 3.21 10E6/UL (ref 4.4–5.9)
SODIUM SERPL-SCNC: 140 MMOL/L (ref 133–144)
SPECIMEN DESCRIPTION: NORMAL
TACROLIMUS BLD-MCNC: <3 UG/L (ref 5–15)
TME LAST DOSE: ABNORMAL H
TME LAST DOSE: ABNORMAL H
WBC # BLD AUTO: 5.9 10E3/UL (ref 4–11)

## 2021-11-30 PROCEDURE — G0463 HOSPITAL OUTPT CLINIC VISIT: HCPCS

## 2021-11-30 PROCEDURE — 250N000011 HC RX IP 250 OP 636: Performed by: PHYSICIAN ASSISTANT

## 2021-11-30 PROCEDURE — 83735 ASSAY OF MAGNESIUM: CPT

## 2021-11-30 PROCEDURE — 81206 BCR/ABL1 GENE MAJOR BP: CPT

## 2021-11-30 PROCEDURE — 99214 OFFICE O/P EST MOD 30 MIN: CPT

## 2021-11-30 PROCEDURE — 80197 ASSAY OF TACROLIMUS: CPT

## 2021-11-30 PROCEDURE — 36591 DRAW BLOOD OFF VENOUS DEVICE: CPT

## 2021-11-30 PROCEDURE — 82040 ASSAY OF SERUM ALBUMIN: CPT

## 2021-11-30 PROCEDURE — 85025 COMPLETE CBC W/AUTO DIFF WBC: CPT

## 2021-11-30 PROCEDURE — G0452 MOLECULAR PATHOLOGY INTERPR: HCPCS | Mod: 26 | Performed by: STUDENT IN AN ORGANIZED HEALTH CARE EDUCATION/TRAINING PROGRAM

## 2021-11-30 RX ORDER — HEPARIN SODIUM (PORCINE) LOCK FLUSH IV SOLN 100 UNIT/ML 100 UNIT/ML
5 SOLUTION INTRAVENOUS
Status: CANCELLED | OUTPATIENT
Start: 2021-11-30

## 2021-11-30 RX ORDER — HEPARIN SODIUM (PORCINE) LOCK FLUSH IV SOLN 100 UNIT/ML 100 UNIT/ML
5 SOLUTION INTRAVENOUS
Status: DISCONTINUED | OUTPATIENT
Start: 2021-11-30 | End: 2021-11-30 | Stop reason: HOSPADM

## 2021-11-30 RX ORDER — LEVOTHYROXINE SODIUM 175 UG/1
175 TABLET ORAL DAILY
Qty: 60 TABLET | Refills: 3 | Status: SHIPPED | OUTPATIENT
Start: 2021-11-30 | End: 2022-01-17

## 2021-11-30 RX ORDER — AMLODIPINE BESYLATE 5 MG/1
5 TABLET ORAL DAILY
Qty: 90 TABLET | Refills: 3 | Status: SHIPPED | OUTPATIENT
Start: 2021-11-30 | End: 2022-01-17

## 2021-11-30 RX ORDER — TACROLIMUS 1 MG/1
2 CAPSULE ORAL 2 TIMES DAILY
Qty: 120 CAPSULE | Refills: 0 | Status: SHIPPED | OUTPATIENT
Start: 2021-11-30 | End: 2022-02-10

## 2021-11-30 RX ORDER — HEPARIN SODIUM,PORCINE 10 UNIT/ML
5 VIAL (ML) INTRAVENOUS
Status: CANCELLED | OUTPATIENT
Start: 2021-11-30

## 2021-11-30 RX ADMIN — Medication 5 ML: at 10:17

## 2021-11-30 ASSESSMENT — PAIN SCALES - GENERAL: PAINLEVEL: NO PAIN (0)

## 2021-11-30 NOTE — LETTER
11/30/2021         RE: Nik Nava  [id number] [facility] [id number]        Dear Colleague,    Thank you for referring your patient, Nik Nava, to the [facility]LOOD AND [facility]ARR[facility] TRANSPLANT PRO[facility] [place]. Please see a copy of my visit note below.    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 Feeling really pretty well. Rash is stable, only using steroid creams 1 time per day on his back and twice daily on his face. No real new areas of involvement or other symptoms of GVHD. Energy is still recovering. Weight is stable.       Review of Systems                                                                                                                                         8 point Review of systems was o/w negative     PHYSICAL EXAM                                                                                                                                                 KPS: 80  Blood pressure 128/70, pulse 68, temperature 97  F (36.1  C), temperature source Tympanic, resp. rate 16, weight 104.5 kg (230 lb 6.4 oz), SpO2 99 %.    Wt Readings from Last 5 Encounters:   11/30/21 104.5 kg (230 lb 6.4 oz)   11/18/21 104.4 kg (230 lb 3.2 oz)   11/15/21 102.5 kg (226 lb)   11/15/21 102.5 kg (226 lb)   11/09/21 105.2 kg (231 lb 14.4 oz)       General: NAD  HEENT: sclera anicteric and non-injected. OP moist without lesions.  No lip swelling.  Lungs: CTAB  Heart: RRR  Abd:  + BS. Soft. No tenderness with palpation  Skin: +facial erythema and slight rashy appearance. Faint macular papular appearance to upper back. No other rashes or petechiae.   Neuro: A&O x3  EXT: No LE edema   Vascular Access: port in the right chest     KPS=90  Current aGVHD staging:  Skin 1, UGI 0, LGI 0, Liver 0 (11/30/2021)    Labs:  Lab Results   Component Value Date    WBC 5.9 11/30/2021    ANEU 3.5 10/26/2021     HGB 10.4 (L) 11/30/2021    HCT 31.7 (L) 11/30/2021     11/30/2021     11/30/2021    POTASSIUM 4.5 11/30/2021    CHLORIDE 106 11/30/2021    CO2 26 11/30/2021     (H) 11/30/2021    BUN 36 (H) 11/30/2021    CR 1.31 (H) 11/30/2021    MAG 2.1 11/30/2021    INR 1.07 11/15/2021    BILITOTAL 0.5 11/30/2021    AST 21 11/30/2021    ALT 22 11/30/2021    ALKPHOS 86 11/30/2021    PROTTOTAL 7.3 11/30/2021    ALBUMIN 3.5 11/30/2021       I have assessed all abnormal lab values for their clinical significance and any values considered clinically significant have been addressed in the assessment and plan.        ASSESSMENT AND PLAN   Nik Nava is a 61 year old male with Ph Pos ALL, day 112 s/p sib allo stem cell transplant    Day -6 (8/4): flu/cy  Day -5 through day -2 (8/5-8/8): flu  Day -1 (8/9): TBI  Day 0 (8/10): transplant     1.  Acute lymphoblastic leukemia, [place] chromosome positive in CR, MRD neg. S/p allo sib PBSCT. (ABO matched)  HCT-CI score: 3 (prior solid tumor)  Day +100 bone marrow biopsy is 100% donor with no morphologic or flow cytometric evidence of leukemia BCR abl is pending.    2.  HEME:   - Transfuse for hgb <7g/dL; plt < 10k.  No transfusions needs  -  pulmonary emboli: He developed a pulmonary emboli in the setting of receiving PEG asparaginase. On Xarelto because this was a provoked thrombus. Continue for anticoagulation for now.       3.  FEN/Renal: Oral intake good/stable  - Cr improved on lower tac.     - Tac induced hypomag, continue 4 tabs/day  - He has a history of renal cancer and resection. has a single kidney.    4.    GVHD:   - On tac 2mg BID, level 5.5 11/15. Level pending today. Plan to taper tac in the next 1-2 weeks pending skin rash. Overall skin rash is pretty minimal and pt is only using steroid cream 1-2 times per day. Re-consider starting taper next week pending eval.   - skin biopsied 8/30 (back)-subtle interface dermatitis, suggestive of gvhd?     5.   ID:   - prophy acv (letrmovir stopped at day +100), fluconazole, Pentamidine (10/8)- overdue for pentamidine- requested this ASAP in the PFT lab.    - CMV neg 11/12- repeat pending.   - influenza vacc given 10/26.  Covid vaccine given November 18- give second dose at next visit.      6. Hx of graves disease; On synthroid; TSH suppressed so Synthroid reduced to 175 mcg daily     7. CV: Norvasc 5mg daily added back today.    8. GI: omeprazole for heartburn      RTC: ASAP for inhaled pentamidine in PFT lab.   7-10 days for visit and labs to re-assess rash and ongoing discussion regarding tac taper.     30 minutes spent on the date of the encounter doing chart review, review of test results, interpretation of tests, patient visit, documentation and discussion with other provider(s)      Socrates De La Torre PA-C  [phone]      Again, thank you for allowing me to participate in the care of your patient.        Sincerely,        BMT Advanced Practice Provider

## 2021-11-30 NOTE — LETTER
Date:December 28, 2021      Provider requested that no letter be sent. Do not send.       [facility]

## 2021-11-30 NOTE — NURSING NOTE
"Oncology Rooming Note    November 30, 2021 10:41 AM   Nik Nava is a 61 year old male who presents for:    Chief Complaint   Patient presents with     Port Draw     port accessed and labs drawn by rn in lab. vital signs taken.     RECHECK     return visit day +112 s/p BMT     Initial Vitals: /70 (BP Location: Right arm, Patient Position: Sitting, Cuff Size: Adult Large)   Pulse 68   Temp 97  F (36.1  C) (Tympanic)   Resp 16   Wt 104.5 kg (230 lb 6.4 oz)   SpO2 99%   BMI 30.40 kg/m   Estimated body mass index is 30.4 kg/m  as calculated from the following:    Height as of 11/15/21: 1.854 m (6' 1\").    Weight as of this encounter: 104.5 kg (230 lb 6.4 oz). Body surface area is 2.32 meters squared.  No Pain (0) Comment: Data Unavailable   No LMP for male patient.  Allergies reviewed: Yes  Medications reviewed: Yes    Medications: MEDICATION REFILLS NEEDED TODAY. Provider was notified.  Pharmacy name entered into [facility]:      [facility] [place], [place] - [address] [place] [address] [phone]    Clinical concerns: None       Catherine Mahan RN            "

## 2021-11-30 NOTE — NURSING NOTE
"Chief Complaint   Patient presents with     Port Draw     port accessed and labs drawn by rn in lab. vital signs taken.     Port accessed by RN in lab with 20g 3/4\" gripper needle, labs drawn, port flushed with saline and heparin, port de-accessed.  vitals checked, pt checked in for next appointment.    Uma Caraballo RN      "

## 2021-12-01 LAB — INTERPRETATION: NORMAL

## 2021-12-06 NOTE — PROGRESS NOTES
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 Tried going without topical steroids for the last week and noticed increased rash on his face and more pink rash on his back. Hasn't spread to new BSA. No GI symptoms. No infectious symptoms. Ongoing hemorrhoids that are not painful, but occasional small amount of bleeding. Stools are soft. Asks questions about travel next year with pandemic      Review of Systems                                                                                                                                         8 point Review of systems was o/w negative     PHYSICAL EXAM                                                                                                                                                 KPS: 80  Blood pressure (!) 142/82, pulse 74, temperature 97.1  F (36.2  C), temperature source Tympanic, resp. rate 14, weight 105.1 kg (231 lb 12.8 oz), SpO2 98 %.    Wt Readings from Last 5 Encounters:   12/07/21 105.1 kg (231 lb 12.8 oz)   11/30/21 104.5 kg (230 lb 6.4 oz)   11/18/21 104.4 kg (230 lb 3.2 oz)   11/15/21 102.5 kg (226 lb)   11/15/21 102.5 kg (226 lb)     General: NAD  HEENT: sclera anicteric and non-injected. OP moist without lesions.  No lip swelling.  Lungs: CTAB  Heart: RRR  Abd:  + BS. Soft. No tenderness with palpation  Skin: +bumpy facial erythema and slight rashy appearance. Faint macular papular appearance to upper back. No other rashes or petechiae.   Neuro: A&O x3  EXT: No LE edema   Vascular Access: port in the right chest     KPS=90  Current aGVHD staging:  Skin 1, UGI 0, LGI 0, Liver 0 (11/30/2021)    Labs:  Lab Results   Component Value Date    WBC 6.3 12/07/2021    ANEU 3.5 10/26/2021    HGB 10.1 (L) 12/07/2021    HCT 30.6 (L) 12/07/2021     12/07/2021     12/07/2021    POTASSIUM 4.1 12/07/2021    CHLORIDE 107  12/07/2021    CO2 26 12/07/2021     (H) 12/07/2021    BUN 33 (H) 12/07/2021    CR 1.40 (H) 12/07/2021    MAG 2.0 12/07/2021    INR 1.07 11/15/2021    BILITOTAL 0.6 12/07/2021    AST 20 12/07/2021    ALT 24 12/07/2021    ALKPHOS 92 12/07/2021    PROTTOTAL 7.2 12/07/2021    ALBUMIN 3.6 12/07/2021       I have assessed all abnormal lab values for their clinical significance and any values considered clinically significant have been addressed in the assessment and plan.        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  Acute lymphoblastic leukemia, [place] chromosome positive in CR, MRD neg. S/p allo sib PBSCT. (ABO matched)  HCT-CI score: 3 (prior solid tumor)  Day +100 bone marrow biopsy is 100% donor with no morphologic or flow cytometric evidence of leukemia BCR abl is pending.    2.  HEME:   - Transfuse for hgb <7g/dL; plt < 10k.  No transfusions needs  -  pulmonary emboli: He developed a pulmonary emboli in the setting of receiving PEG asparaginase. On Xarelto because this was a provoked thrombus. Continue for anticoagulation for now.       3.  FEN/Renal: Oral intake good/stable  - Cr improved on lower tac.     - Tac induced hypomag, continue 4 tabs/day  - He has a history of renal cancer and resection. has a single kidney.    4.    GVHD:   - On tac 2mg BID, level 5.5 11/15. Level 11/30 surprisingly < 3: since plan is to taper soon, no changes made to tac dose. 12/7 held tac dose for level today. Since rash flared a bit, curious what level is  - Plan to taper tac in the next 1-2 weeks pending skin rash. Flared a bit 12/7 - will use topical steroids TID as much as possible this week  - skin biopsied 8/30 (back)-subtle interface dermatitis, suggestive of gvhd?     5.  ID:   - prophy acv (letrmovir stopped at day  +100), fluconazole, Pentamidine (12/7)  - CMV neg 11/12- repeat pending.   - influenza vacc given 10/26.  Covid vaccine given November 18- give second dose at next visit 12/14     6. Hx of graves disease; On synthroid; TSH suppressed so Synthroid reduced to 175 mcg daily     7. CV: Norvasc 5mg daily    8. GI: omeprazole for heartburn      Plan  - cont topicals TID, didn't start tac taper yet  - second covid vax due next visit  - pentamidine given today    40 minutes spent on the date of the encounter doing chart review, review of test results, interpretation of tests, patient visit, documentation and discussion with other provider(s)      Natty Gay PA-C  [phone]

## 2021-12-07 ENCOUNTER — APPOINTMENT (OUTPATIENT)
Dept: LAB | Facility: CLINIC | Age: 61
End: 2021-12-07
Attending: PHYSICIAN ASSISTANT
Payer: COMMERCIAL

## 2021-12-07 ENCOUNTER — ONCOLOGY VISIT (OUTPATIENT)
Dept: TRANSPLANT | Facility: CLINIC | Age: 61
End: 2021-12-07
Attending: PHYSICIAN ASSISTANT
Payer: COMMERCIAL

## 2021-12-07 VITALS
OXYGEN SATURATION: 98 % | RESPIRATION RATE: 14 BRPM | DIASTOLIC BLOOD PRESSURE: 82 MMHG | SYSTOLIC BLOOD PRESSURE: 142 MMHG | BODY MASS INDEX: 30.58 KG/M2 | HEART RATE: 74 BPM | TEMPERATURE: 97.1 F | WEIGHT: 231.8 LBS

## 2021-12-07 DIAGNOSIS — Z94.81 STATUS POST BONE MARROW TRANSPLANT (H): Primary | ICD-10-CM

## 2021-12-07 DIAGNOSIS — Z94.81 STATUS POST BONE MARROW TRANSPLANT (H): ICD-10-CM

## 2021-12-07 LAB
ALBUMIN SERPL-MCNC: 3.6 G/DL (ref 3.4–5)
ALP SERPL-CCNC: 92 U/L (ref 40–150)
ALT SERPL W P-5'-P-CCNC: 24 U/L (ref 0–70)
ANION GAP SERPL CALCULATED.3IONS-SCNC: 7 MMOL/L (ref 3–14)
AST SERPL W P-5'-P-CCNC: 20 U/L (ref 0–45)
BASOPHILS # BLD AUTO: 0 10E3/UL (ref 0–0.2)
BASOPHILS NFR BLD AUTO: 0 %
BILIRUB SERPL-MCNC: 0.6 MG/DL (ref 0.2–1.3)
BUN SERPL-MCNC: 33 MG/DL (ref 7–30)
CALCIUM SERPL-MCNC: 9.4 MG/DL (ref 8.5–10.1)
CHLORIDE BLD-SCNC: 107 MMOL/L (ref 94–109)
CO2 SERPL-SCNC: 26 MMOL/L (ref 20–32)
CREAT SERPL-MCNC: 1.4 MG/DL (ref 0.66–1.25)
EOSINOPHIL # BLD AUTO: 0.3 10E3/UL (ref 0–0.7)
EOSINOPHIL NFR BLD AUTO: 4 %
ERYTHROCYTE [DISTWIDTH] IN BLOOD BY AUTOMATED COUNT: 13.2 % (ref 10–15)
GFR SERPL CREATININE-BSD FRML MDRD: 53 ML/MIN/1.73M2
GLUCOSE BLD-MCNC: 110 MG/DL (ref 70–99)
HCT VFR BLD AUTO: 30.6 % (ref 40–53)
HGB BLD-MCNC: 10.1 G/DL (ref 13.3–17.7)
IMM GRANULOCYTES # BLD: 0 10E3/UL
IMM GRANULOCYTES NFR BLD: 0 %
LYMPHOCYTES # BLD AUTO: 2.2 10E3/UL (ref 0.8–5.3)
LYMPHOCYTES NFR BLD AUTO: 35 %
MAGNESIUM SERPL-MCNC: 2 MG/DL (ref 1.6–2.3)
MCH RBC QN AUTO: 32.1 PG (ref 26.5–33)
MCHC RBC AUTO-ENTMCNC: 33 G/DL (ref 31.5–36.5)
MCV RBC AUTO: 97 FL (ref 78–100)
MONOCYTES # BLD AUTO: 1 10E3/UL (ref 0–1.3)
MONOCYTES NFR BLD AUTO: 16 %
NEUTROPHILS # BLD AUTO: 2.8 10E3/UL (ref 1.6–8.3)
NEUTROPHILS NFR BLD AUTO: 45 %
NRBC # BLD AUTO: 0 10E3/UL
NRBC BLD AUTO-RTO: 0 /100
PLATELET # BLD AUTO: 198 10E3/UL (ref 150–450)
POTASSIUM BLD-SCNC: 4.1 MMOL/L (ref 3.4–5.3)
PROT SERPL-MCNC: 7.2 G/DL (ref 6.8–8.8)
RBC # BLD AUTO: 3.15 10E6/UL (ref 4.4–5.9)
SODIUM SERPL-SCNC: 140 MMOL/L (ref 133–144)
TACROLIMUS BLD-MCNC: <3 UG/L (ref 5–15)
TME LAST DOSE: ABNORMAL H
TME LAST DOSE: ABNORMAL H
WBC # BLD AUTO: 6.3 10E3/UL (ref 4–11)

## 2021-12-07 PROCEDURE — G0463 HOSPITAL OUTPT CLINIC VISIT: HCPCS

## 2021-12-07 PROCEDURE — 99215 OFFICE O/P EST HI 40 MIN: CPT

## 2021-12-07 PROCEDURE — 94640 AIRWAY INHALATION TREATMENT: CPT | Performed by: INTERNAL MEDICINE

## 2021-12-07 PROCEDURE — 83735 ASSAY OF MAGNESIUM: CPT

## 2021-12-07 PROCEDURE — 85004 AUTOMATED DIFF WBC COUNT: CPT

## 2021-12-07 PROCEDURE — 36591 DRAW BLOOD OFF VENOUS DEVICE: CPT

## 2021-12-07 PROCEDURE — 80197 ASSAY OF TACROLIMUS: CPT

## 2021-12-07 PROCEDURE — 80053 COMPREHEN METABOLIC PANEL: CPT

## 2021-12-07 PROCEDURE — 94642 AEROSOL INHALATION TREATMENT: CPT | Performed by: INTERNAL MEDICINE

## 2021-12-07 PROCEDURE — 250N000011 HC RX IP 250 OP 636: Performed by: PHYSICIAN ASSISTANT

## 2021-12-07 RX ORDER — ALBUTEROL SULFATE 5 MG/ML
2.5 SOLUTION RESPIRATORY (INHALATION) EVERY 6 HOURS PRN
Status: CANCELLED
Start: 2022-01-07

## 2021-12-07 RX ORDER — ALBUTEROL SULFATE 5 MG/ML
2.5 SOLUTION RESPIRATORY (INHALATION) EVERY 6 HOURS PRN
Status: DISCONTINUED | OUTPATIENT
Start: 2021-12-07 | End: 2021-12-07 | Stop reason: HOSPADM

## 2021-12-07 RX ORDER — HEPARIN SODIUM (PORCINE) LOCK FLUSH IV SOLN 100 UNIT/ML 100 UNIT/ML
5 SOLUTION INTRAVENOUS
Status: CANCELLED | OUTPATIENT
Start: 2022-01-07

## 2021-12-07 RX ORDER — PENTAMIDINE ISETHIONATE 300 MG/300MG
300 INHALANT RESPIRATORY (INHALATION)
Status: DISCONTINUED | OUTPATIENT
Start: 2021-12-07 | End: 2021-12-07 | Stop reason: HOSPADM

## 2021-12-07 RX ORDER — PENTAMIDINE ISETHIONATE 300 MG/300MG
300 INHALANT RESPIRATORY (INHALATION)
Status: CANCELLED
Start: 2022-01-07

## 2021-12-07 RX ORDER — HEPARIN SODIUM (PORCINE) LOCK FLUSH IV SOLN 100 UNIT/ML 100 UNIT/ML
5 SOLUTION INTRAVENOUS DAILY PRN
Status: DISCONTINUED | OUTPATIENT
Start: 2021-12-07 | End: 2021-12-07 | Stop reason: HOSPADM

## 2021-12-07 RX ORDER — HEPARIN SODIUM,PORCINE 10 UNIT/ML
5 VIAL (ML) INTRAVENOUS
Status: CANCELLED | OUTPATIENT
Start: 2022-01-07

## 2021-12-07 RX ADMIN — HEPARIN 5 ML: 100 SYRINGE at 11:12

## 2021-12-07 RX ADMIN — PENTAMIDINE ISETHIONATE 300 MG: 300 INHALANT RESPIRATORY (INHALATION) at 10:26

## 2021-12-07 ASSESSMENT — PAIN SCALES - GENERAL: PAINLEVEL: NO PAIN (0)

## 2021-12-07 NOTE — LETTER
12/7/2021         RE: Nik Nava  [address] [facility] [address]        Dear Colleague,    Thank you for referring your patient, Nik Nava, to the [facility] BLOOD AND MARROW TRANSPLANT PROGRAM [place]. Please see a copy of my visit note below.    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 Tried going without topical steroids for the last week and noticed increased rash on his face and more pink rash on his back. Hasn't spread to new BSA. No GI symptoms. No infectious symptoms. Ongoing hemorrhoids that are not painful, but occasional small amount of bleeding. Stools are soft. Asks questions about travel next year with pandemic      Review of Systems                                                                                                                                         8 point Review of systems was o/w negative     PHYSICAL EXAM                                                                                                                                                 KPS: 80  Blood pressure (!) 142/82, pulse 74, temperature 97.1  F (36.2  C), temperature source Tympanic, resp. rate 14, weight 105.1 kg (231 lb 12.8 oz), SpO2 98 %.    Wt Readings from Last 5 Encounters:   12/07/21 105.1 kg (231 lb 12.8 oz)   11/30/21 104.5 kg (230 lb 6.4 oz)   11/18/21 104.4 kg (230 lb 3.2 oz)   11/15/21 102.5 kg (226 lb)   11/15/21 102.5 kg (226 lb)     General: NAD  HEENT: sclera anicteric and non-injected. OP moist without lesions.  No lip swelling.  Lungs: CTAB  Heart: RRR  Abd:  + BS. Soft. No tenderness with palpation  Skin: +bumpy facial erythema and slight rashy appearance. Faint macular papular appearance to upper back. No other rashes or petechiae.   Neuro: A&O x3  EXT: No LE edema   Vascular Access: port in the right chest     KPS=90  Current aGVHD staging:   Skin 1, UGI 0, LGI 0, Liver 0 (11/30/2021)    Labs:  Lab Results   Component Value Date    WBC 6.3 12/07/2021    ANEU 3.5 10/26/2021    HGB 10.1 (L) 12/07/2021    HCT 30.6 (L) 12/07/2021     12/07/2021     12/07/2021    POTASSIUM 4.1 12/07/2021    CHLORIDE 107 12/07/2021    CO2 26 12/07/2021     (H) 12/07/2021    BUN 33 (H) 12/07/2021    CR 1.40 (H) 12/07/2021    MAG 2.0 12/07/2021    INR 1.07 11/15/2021    BILITOTAL 0.6 12/07/2021    AST 20 12/07/2021    ALT 24 12/07/2021    ALKPHOS 92 12/07/2021    PROTTOTAL 7.2 12/07/2021    ALBUMIN 3.6 12/07/2021       I have assessed all abnormal lab values for their clinical significance and any values considered clinically significant have been addressed in the assessment and plan.        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 S/p allo sib PBSCT. (ABO matched)  HCT-CI score: 3 (prior solid tumor)  Day +100 bone marrow biopsy is 100% donor with no morphologic or flow cytometric evidence of leukemia BCR abl is pending.    2.  HEME:   - Transfuse for hgb <7g/dL; plt < 10k.  No transfusions needs  -  pulmonary emboli: He developed a pulmonary emboli in the setting of receiving PEG asparaginase. On Xarelto because this was a provoked thrombus. Continue for anticoagulation for now.       3.  FEN/Renal: Oral intake good/stable  - Cr improved on lower tac.     - Tac induced hypomag, continue 4 tabs/day  - He has a history of renal cancer and resection. has a single kidney.    4.    GVHD:   - On tac 2mg BID, level 5.5 11/15. Level 11/30 surprisingly < 3: since plan is to taper soon, no changes made to tac dose. 12/7 held tac dose for level today. Since rash flared a bit, curious what level is  -  Plan to taper tac in the next 1-2 weeks pending skin rash. Flared a bit 12/7 - will use topical steroids TID as much as possible this week  - skin biopsied 8/30 (back)-subtle interface dermatitis, suggestive of gvhd?     5.  ID:   - prophy acv (letrmovir stopped at day +100), fluconazole, Pentamidine (12/7)  - CMV neg 11/12- repeat pending.   - influenza vacc given 10/26.  Covid vaccine given November 18- give second dose at next visit 12/14     6. Hx of graves disease; On synthroid; TSH suppressed so Synthroid reduced to 175 mcg daily     7. CV: Norvasc 5mg daily    8. GI: omeprazole for heartburn      Plan  - cont topicals TID, didn't start tac taper yet  - second covid vax due next visit  - pentamidine given today    40 minutes spent on the date of the encounter doing chart review, review of test results, interpretation of tests, patient visit, documentation and discussion with other provider(s)      Natty Gay PA-C  [phone]        Again, thank you for allowing me to participate in the care of your patient.        Sincerely,        BMT Advanced Practice Provider

## 2021-12-07 NOTE — NURSING NOTE
"Oncology Rooming Note    December 7, 2021 11:34 AM   Nik Nava is a 61 year old male who presents for:    Chief Complaint   Patient presents with     Port Draw     Labs drawn via port by RN in lab. VS taken.      Oncology Clinic Visit     Status post bone marrow transplant (H)     Initial Vitals: BP (!) 142/82   Pulse 74   Temp 97.1  F (36.2  C) (Tympanic)   Resp 14   Wt 105.1 kg (231 lb 12.8 oz)   SpO2 98%   BMI 30.58 kg/m   Estimated body mass index is 30.58 kg/m  as calculated from the following:    Height as of 11/15/21: 1.854 m (6' 1\").    Weight as of this encounter: 105.1 kg (231 lb 12.8 oz). Body surface area is 2.33 meters squared.  No Pain (0) Comment: Data Unavailable   No LMP for male patient.  Allergies reviewed: Yes  Medications reviewed: Yes    Medications: Medication refills not needed today.  Pharmacy name entered into [employer]:    [facility] - [place], [place] - [address] [facility] - [place], [place] - [phone]    Clinical concerns: Please do a skin check today for patient-rash on back. Patient held Tacrolimus today for labs.        Missy Waterman LPN December 7, 2021 11:34 AM                "

## 2021-12-07 NOTE — PROGRESS NOTES
Nik Nava is here for pentamidine neb treatment per Dr. [place].  Patient was first given 2.5 mg albuterol neb as additional treatment.  Patient was then given NebuPent 300 mg mixed with 6 mL sterile water.  Patient was able to complete pentamidine neb with no complications noted.  Procedure was done by Robert Mora.    Albuterol Sulfate 2.5 mg / 3 mL  [id number]  [id number]   Jul 2023

## 2021-12-07 NOTE — NURSING NOTE
Chief Complaint   Patient presents with     Port Draw     Labs drawn via port by RN in lab. VS taken.      Labs drawn via Port accessed using 20g flat needle. Line flushed and Heparin locked. Vital signs taken. Checked into next appointment.     Emiliana Weinstein RN

## 2021-12-07 NOTE — LETTER
Date:December 30, 2021      Provider requested that no letter be sent. Do not send.       [facility]

## 2021-12-08 LAB — CMV DNA SPEC NAA+PROBE-ACNC: NOT DETECTED IU/ML

## 2021-12-14 ENCOUNTER — ONCOLOGY VISIT (OUTPATIENT)
Dept: TRANSPLANT | Facility: CLINIC | Age: 61
End: 2021-12-14
Attending: PHYSICIAN ASSISTANT
Payer: COMMERCIAL

## 2021-12-14 ENCOUNTER — APPOINTMENT (OUTPATIENT)
Dept: LAB | Facility: CLINIC | Age: 61
End: 2021-12-14
Attending: PHYSICIAN ASSISTANT
Payer: COMMERCIAL

## 2021-12-14 VITALS
DIASTOLIC BLOOD PRESSURE: 84 MMHG | WEIGHT: 229 LBS | RESPIRATION RATE: 16 BRPM | TEMPERATURE: 98.1 F | BODY MASS INDEX: 30.21 KG/M2 | SYSTOLIC BLOOD PRESSURE: 133 MMHG | HEART RATE: 70 BPM | OXYGEN SATURATION: 99 %

## 2021-12-14 DIAGNOSIS — Z94.81 STATUS POST BONE MARROW TRANSPLANT (H): Primary | ICD-10-CM

## 2021-12-14 DIAGNOSIS — C91.01 ACUTE LYMPHOBLASTIC LEUKEMIA (ALL) IN REMISSION (H): ICD-10-CM

## 2021-12-14 LAB
ALBUMIN SERPL-MCNC: 3.8 G/DL (ref 3.4–5)
ALP SERPL-CCNC: 78 U/L (ref 40–150)
ALT SERPL W P-5'-P-CCNC: 25 U/L (ref 0–70)
ANION GAP SERPL CALCULATED.3IONS-SCNC: 7 MMOL/L (ref 3–14)
AST SERPL W P-5'-P-CCNC: 20 U/L (ref 0–45)
BASOPHILS # BLD AUTO: 0 10E3/UL (ref 0–0.2)
BASOPHILS NFR BLD AUTO: 1 %
BILIRUB SERPL-MCNC: 0.6 MG/DL (ref 0.2–1.3)
BUN SERPL-MCNC: 32 MG/DL (ref 7–30)
CALCIUM SERPL-MCNC: 9.4 MG/DL (ref 8.5–10.1)
CHLORIDE BLD-SCNC: 108 MMOL/L (ref 94–109)
CO2 SERPL-SCNC: 26 MMOL/L (ref 20–32)
CREAT SERPL-MCNC: 1.33 MG/DL (ref 0.66–1.25)
EOSINOPHIL # BLD AUTO: 0.3 10E3/UL (ref 0–0.7)
EOSINOPHIL NFR BLD AUTO: 4 %
ERYTHROCYTE [DISTWIDTH] IN BLOOD BY AUTOMATED COUNT: 13.2 % (ref 10–15)
GFR SERPL CREATININE-BSD FRML MDRD: 56 ML/MIN/1.73M2
GLUCOSE BLD-MCNC: 100 MG/DL (ref 70–99)
HCT VFR BLD AUTO: 30.9 % (ref 40–53)
HGB BLD-MCNC: 10.2 G/DL (ref 13.3–17.7)
HOLD SPECIMEN: NORMAL
IMM GRANULOCYTES # BLD: 0 10E3/UL
IMM GRANULOCYTES NFR BLD: 0 %
LYMPHOCYTES # BLD AUTO: 2.2 10E3/UL (ref 0.8–5.3)
LYMPHOCYTES NFR BLD AUTO: 35 %
MCH RBC QN AUTO: 32.4 PG (ref 26.5–33)
MCHC RBC AUTO-ENTMCNC: 33 G/DL (ref 31.5–36.5)
MCV RBC AUTO: 98 FL (ref 78–100)
MONOCYTES # BLD AUTO: 0.8 10E3/UL (ref 0–1.3)
MONOCYTES NFR BLD AUTO: 13 %
NEUTROPHILS # BLD AUTO: 2.9 10E3/UL (ref 1.6–8.3)
NEUTROPHILS NFR BLD AUTO: 47 %
NRBC # BLD AUTO: 0 10E3/UL
NRBC BLD AUTO-RTO: 0 /100
PLATELET # BLD AUTO: 180 10E3/UL (ref 150–450)
POTASSIUM BLD-SCNC: 4.5 MMOL/L (ref 3.4–5.3)
PROT SERPL-MCNC: 7.1 G/DL (ref 6.8–8.8)
RBC # BLD AUTO: 3.15 10E6/UL (ref 4.4–5.9)
SODIUM SERPL-SCNC: 141 MMOL/L (ref 133–144)
TACROLIMUS BLD-MCNC: <3 UG/L (ref 5–15)
TME LAST DOSE: ABNORMAL H
TME LAST DOSE: ABNORMAL H
WBC # BLD AUTO: 6.2 10E3/UL (ref 4–11)

## 2021-12-14 PROCEDURE — 250N000011 HC RX IP 250 OP 636: Performed by: PHYSICIAN ASSISTANT

## 2021-12-14 PROCEDURE — 91300 HC RX IP 250 OP 636: CPT | Performed by: INTERNAL MEDICINE

## 2021-12-14 PROCEDURE — 99215 OFFICE O/P EST HI 40 MIN: CPT

## 2021-12-14 PROCEDURE — 0002A HC ADMIN COVID VAC PFIZER, 2ND DOSE: CPT | Performed by: INTERNAL MEDICINE

## 2021-12-14 PROCEDURE — 80053 COMPREHEN METABOLIC PANEL: CPT

## 2021-12-14 PROCEDURE — 85025 COMPLETE CBC W/AUTO DIFF WBC: CPT

## 2021-12-14 PROCEDURE — 250N000011 HC RX IP 250 OP 636: Performed by: INTERNAL MEDICINE

## 2021-12-14 PROCEDURE — 80197 ASSAY OF TACROLIMUS: CPT

## 2021-12-14 PROCEDURE — 36591 DRAW BLOOD OFF VENOUS DEVICE: CPT

## 2021-12-14 PROCEDURE — G0463 HOSPITAL OUTPT CLINIC VISIT: HCPCS | Mod: 25

## 2021-12-14 RX ORDER — ACYCLOVIR 800 MG/1
800 TABLET ORAL 2 TIMES DAILY
Qty: 180 TABLET | Refills: 1 | Status: SHIPPED | OUTPATIENT
Start: 2021-12-14 | End: 2022-01-17

## 2021-12-14 RX ORDER — ALBUTEROL SULFATE 5 MG/ML
2.5 SOLUTION RESPIRATORY (INHALATION) EVERY 6 HOURS PRN
Status: CANCELLED
Start: 2022-01-07

## 2021-12-14 RX ORDER — MAGNESIUM OXIDE 400 MG/1
400 TABLET ORAL 2 TIMES DAILY
Qty: 120 TABLET | Refills: 1 | COMMUNITY
Start: 2021-12-14 | End: 2022-02-07

## 2021-12-14 RX ORDER — MAGNESIUM OXIDE 400 MG/1
400 TABLET ORAL 2 TIMES DAILY
Qty: 120 TABLET | Refills: 1 | Status: SHIPPED | OUTPATIENT
Start: 2021-12-14 | End: 2021-12-14

## 2021-12-14 RX ORDER — HEPARIN SODIUM (PORCINE) LOCK FLUSH IV SOLN 100 UNIT/ML 100 UNIT/ML
5 SOLUTION INTRAVENOUS
Status: CANCELLED | OUTPATIENT
Start: 2022-01-07

## 2021-12-14 RX ORDER — PENTAMIDINE ISETHIONATE 300 MG/300MG
300 INHALANT RESPIRATORY (INHALATION)
Status: CANCELLED
Start: 2022-01-07

## 2021-12-14 RX ORDER — LORAZEPAM 1 MG/1
1 TABLET ORAL
Qty: 30 TABLET | Refills: 0 | Status: SHIPPED | OUTPATIENT
Start: 2021-12-14 | End: 2022-01-17

## 2021-12-14 RX ORDER — HEPARIN SODIUM,PORCINE 10 UNIT/ML
5 VIAL (ML) INTRAVENOUS
Status: CANCELLED | OUTPATIENT
Start: 2022-01-07

## 2021-12-14 RX ORDER — HEPARIN SODIUM (PORCINE) LOCK FLUSH IV SOLN 100 UNIT/ML 100 UNIT/ML
5 SOLUTION INTRAVENOUS
Status: COMPLETED | OUTPATIENT
Start: 2021-12-14 | End: 2021-12-14

## 2021-12-14 RX ADMIN — RNA INGREDIENT BNT-162B2 0.3 ML: 0.23 INJECTION, SUSPENSION INTRAMUSCULAR at 12:04

## 2021-12-14 RX ADMIN — Medication 5 ML: at 10:46

## 2021-12-14 ASSESSMENT — PAIN SCALES - GENERAL: PAINLEVEL: NO PAIN (0)

## 2021-12-14 NOTE — NURSING NOTE
"Chief Complaint   Patient presents with     Port Draw     labs drawn from port by rn.  vs taken     Port accessed with 20 gauge 3/4\" Power needle and labs drawn by rn.  Port flushed with NS and heparin.  Pt tolerated well.  VS taken.  Pt checked in for next appt.    Argenis Watkins RN      "

## 2021-12-14 NOTE — PROGRESS NOTES
BMT Clinic Note    ID:  Nik Nava is a 62 yo man D+126 s/p NMA allo sib PBSCT for Ph+ ALL    Interval History: Nik is seen for follow up with his wife. Rash improved with resuming steroids to upper back and face. No visible rash today. No other acute medical complaints. Curious about ongoing food restrictions. Energy level good. No n/v/d, good appetite.       Review of Systems                                                                                                                                         8 point Review of systems was o/w negative     PHYSICAL EXAM                                                                                                                                                 KPS: 80  Blood pressure 133/84, pulse 70, temperature 98.1  F (36.7  C), temperature source Tympanic, resp. rate 16, weight 103.9 kg (229 lb), SpO2 99 %.    Wt Readings from Last 5 Encounters:   12/14/21 103.9 kg (229 lb)   12/07/21 105.1 kg (231 lb 12.8 oz)   11/30/21 104.5 kg (230 lb 6.4 oz)   11/18/21 104.4 kg (230 lb 3.2 oz)   11/15/21 102.5 kg (226 lb)     General: NAD  HEENT: sclera anicteric and non-injected. OP moist without lesions.  No lip swelling.  Lungs: CTAB  Heart: RRR  Abd:  + BS. Soft. No tenderness with palpation  Skin: mild facial erythema but no other visible rashes.    Neuro: A&O x3  EXT: No LE edema   Vascular Access: port in the right chest     KPS=90  Current aGVHD staging:  Skin 0, UGI 0, LGI 0, Liver 0 (12/14/2021)    Labs:  Lab Results   Component Value Date    WBC 6.2 12/14/2021    ANEU 3.5 10/26/2021    HGB 10.2 (L) 12/14/2021    HCT 30.9 (L) 12/14/2021     12/14/2021     12/14/2021    POTASSIUM 4.5 12/14/2021    CHLORIDE 108 12/14/2021    CO2 26 12/14/2021     (H) 12/14/2021    BUN 32 (H) 12/14/2021    CR 1.33 (H) 12/14/2021    MAG 2.0 12/07/2021    INR 1.07 11/15/2021    BILITOTAL 0.6 12/14/2021    AST 20 12/14/2021    ALT 25 12/14/2021    ALKPHOS 78  12/14/2021    PROTTOTAL 7.1 12/14/2021    ALBUMIN 3.8 12/14/2021       I have assessed all abnormal lab values for their clinical significance and any values considered clinically significant have been addressed in the assessment and plan.        ASSESSMENT AND PLAN   Nik Nava is a 61 year old male with Ph Pos ALL, day 126 s/p sib allo stem cell transplant    Day -6 (8/4): flu/cy  Day -5 through day -2 (8/5-8/8): flu  Day -1 (8/9): TBI  Day 0 (8/10): transplant     1.  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 S/p allo sib PBSCT. (ABO matched)  HCT-CI score: 3 (prior solid tumor)  Day +100 bone marrow biopsy is 100% donor with no morphologic or flow cytometric evidence of leukemia BCR abl is pending.    2.  HEME:   - Transfuse for hgb <7g/dL; plt < 10k.  No transfusions needs  -  pulmonary emboli: He developed a pulmonary emboli in the setting of receiving PEG asparaginase. On Xarelto because this was a provoked thrombus. Continue for anticoagulation for now.       3.  FEN/Renal: Oral intake good/stable  - Cr improved on lower tac.     - Tac induced hypomag, decrease to 2tabs per day 12/14.   - He has a history of renal cancer and resection. has a single kidney.    4.    GVHD:   - On tac 2mg BID, with levels consistently sub-therapeutic. Have been holding off tapering tac d/t slight facial and upper back rash that has been responsive to steroid creams. Drop tac to 1.5mg/2mg 12/14 and continue steroid creams. If no worsening of rash on lower tac will plan to start formal tac taper next week.   - skin biopsied 8/30 (back)-subtle interface dermatitis, suggestive of gvhd?     5.  ID:   - prophy acv (letrmovir stopped at day +100), fluconazole, Pentamidine (12/7)  - CMV neg 11/12- repeat pending.   - influenza vacc given 10/26.  Covid vaccine given November 18- give second dose today.     6. Hx of graves disease; On synthroid 175 mcg daily     7. CV: Norvasc 5mg daily    8. GI:  omeprazole for heartburn      Plan: decrease mag to 2 tabs per day, decrease tac and monitor for flares in rash or other GVHD symptoms, second covid vaccine    RTC: 1 week.     40 minutes spent on the date of the encounter doing chart review, review of test results, interpretation of tests, patient visit, documentation and discussion with other provider(s)      Socrates De La Torre PA-C  [phone]

## 2021-12-14 NOTE — NURSING NOTE
The following medication was given:     MEDICATION: COVID 19 mRNA Vacc Pfizer   ROUTE: IM  SITE: Arm - Left  DOSE: 0.3ml   LOT #: [id number]  :  Pfizer  EXPIRATION DATE:  12/14/2021      Winnie Corrales RN

## 2021-12-14 NOTE — NURSING NOTE
"Oncology Rooming Note    December 14, 2021 11:10 AM   Nik Nava is a 61 year old male who presents for:    Chief Complaint   Patient presents with     Port Draw     labs drawn from port by rn.  vs taken     RECHECK     Here to se provider r/t ALL      Initial Vitals: /84 (BP Location: Right arm, Patient Position: Sitting, Cuff Size: Adult Large)   Pulse 70   Temp 98.1  F (36.7  C) (Tympanic)   Resp 16   Wt 103.9 kg (229 lb)   SpO2 99%   BMI 30.21 kg/m   Estimated body mass index is 30.21 kg/m  as calculated from the following:    Height as of 11/15/21: 1.854 m (6' 1\").    Weight as of this encounter: 103.9 kg (229 lb). Body surface area is 2.31 meters squared.  No Pain (0) Comment: Data Unavailable   No LMP for male patient.  Allergies reviewed: Yes  Medications reviewed: Yes    Medications: MEDICATION REFILLS NEEDED TODAY. Provider was notified. Mag, Acyclovir, Lorazepam.     Pharmacy name entered into [employer]:    [facility] - [place], [place] - [address] [facility] [place], [place] - [phone]    Clinical concerns: SHELDON Corrales RN              "

## 2021-12-14 NOTE — LETTER
12/14/2021         RE: Nik Nava  [address] [facility] [address]        Dear Colleague,    Thank you for referring your patient, Nik Nava, to the [facility] BLOOD AND MARROW TRANSPLANT PROGRAM [place]. Please see a copy of my visit note below.    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 Rash improved with resuming steroids to upper back and face. No visible rash today. No other acute medical complaints. Curious about ongoing food restrictions. Energy level good. No n/v/d, good appetite.       Review of Systems                                                                                                                                         8 point Review of systems was o/w negative     PHYSICAL EXAM                                                                                                                                                 KPS: 80  Blood pressure 133/84, pulse 70, temperature 98.1  F (36.7  C), temperature source Tympanic, resp. rate 16, weight 103.9 kg (229 lb), SpO2 99 %.    Wt Readings from Last 5 Encounters:   12/14/21 103.9 kg (229 lb)   12/07/21 105.1 kg (231 lb 12.8 oz)   11/30/21 104.5 kg (230 lb 6.4 oz)   11/18/21 104.4 kg (230 lb 3.2 oz)   11/15/21 102.5 kg (226 lb)     General: NAD  HEENT: sclera anicteric and non-injected. OP moist without lesions.  No lip swelling.  Lungs: CTAB  Heart: RRR  Abd:  + BS. Soft. No tenderness with palpation  Skin: mild facial erythema but no other visible rashes.    Neuro: A&O x3  EXT: No LE edema   Vascular Access: port in the right chest     KPS=90  Current aGVHD staging:  Skin 0, UGI 0, LGI 0, Liver 0 (12/14/2021)    Labs:  Lab Results   Component Value Date    WBC 6.2 12/14/2021    ANEU 3.5 10/26/2021    HGB 10.2 (L) 12/14/2021    HCT 30.9 (L) 12/14/2021     12/14/2021      12/14/2021    POTASSIUM 4.5 12/14/2021    CHLORIDE 108 12/14/2021    CO2 26 12/14/2021     (H) 12/14/2021    BUN 32 (H) 12/14/2021    CR 1.33 (H) 12/14/2021    MAG 2.0 12/07/2021    INR 1.07 11/15/2021    BILITOTAL 0.6 12/14/2021    AST 20 12/14/2021    ALT 25 12/14/2021    ALKPHOS 78 12/14/2021    PROTTOTAL 7.1 12/14/2021    ALBUMIN 3.8 12/14/2021       I have assessed all abnormal lab values for their clinical significance and any values considered clinically significant have been addressed in the assessment and plan.        ASSESSMENT AND PLAN   Nik Nava is a 61 year old male with Ph Pos ALL, day 126 s/p sib allo stem cell transplant    Day -6 (8/4): flu/cy  Day -5 through day -2 (8/5-8/8): flu  Day -1 (8/9): TBI  Day 0 (8/10): transplant     1.  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 S/p allo sib PBSCT. (ABO matched)  HCT-CI score: 3 (prior solid tumor)  Day +100 bone marrow biopsy is 100% donor with no morphologic or flow cytometric evidence of leukemia BCR abl is pending.    2.  HEME:   - Transfuse for hgb <7g/dL; plt < 10k.  No transfusions needs  -  pulmonary emboli: He developed a pulmonary emboli in the setting of receiving PEG asparaginase. On Xarelto because this was a provoked thrombus. Continue for anticoagulation for now.       3.  FEN/Renal: Oral intake good/stable  - Cr improved on lower tac.     - Tac induced hypomag, decrease to 2tabs per day 12/14.   - He has a history of renal cancer and resection. has a single kidney.    4.    GVHD:   - On tac 2mg BID, with levels consistently sub-therapeutic. Have been holding off tapering tac d/t slight facial and upper back rash that has been responsive to steroid creams. Drop tac to 1.5mg/2mg 12/14 and continue steroid creams. If no worsening of rash on lower tac will plan to start formal tac taper next week.   - skin biopsied 8/30 (back)-subtle interface dermatitis, suggestive of gvhd?     5.  ID:   -  prophy acv (letrmovir stopped at day +100), fluconazole, Pentamidine (12/7)  - CMV neg 11/12- repeat pending.   - influenza vacc given 10/26.  Covid vaccine given November 18- give second dose today.     6. Hx of graves disease; On synthroid 175 mcg daily     7. CV: Norvasc 5mg daily    8. GI: omeprazole for heartburn      Plan: decrease mag to 2 tabs per day, decrease tac and monitor for flares in rash or other GVHD symptoms, second covid vaccine    RTC: 1 week.     40 minutes spent on the date of the encounter doing chart review, review of test results, interpretation of tests, patient visit, documentation and discussion with other provider(s)      Socrates De La Torre PA-C  [phone]      Again, thank you for allowing me to participate in the care of your patient.        Sincerely,        BMT Advanced Practice Provider

## 2021-12-21 ENCOUNTER — APPOINTMENT (OUTPATIENT)
Dept: LAB | Facility: CLINIC | Age: 61
End: 2021-12-21
Attending: PHYSICIAN ASSISTANT
Payer: COMMERCIAL

## 2021-12-21 ENCOUNTER — ONCOLOGY VISIT (OUTPATIENT)
Dept: TRANSPLANT | Facility: CLINIC | Age: 61
End: 2021-12-21
Attending: PHYSICIAN ASSISTANT
Payer: COMMERCIAL

## 2021-12-21 VITALS
BODY MASS INDEX: 30.42 KG/M2 | HEART RATE: 78 BPM | RESPIRATION RATE: 16 BRPM | SYSTOLIC BLOOD PRESSURE: 126 MMHG | WEIGHT: 230.6 LBS | OXYGEN SATURATION: 99 % | DIASTOLIC BLOOD PRESSURE: 70 MMHG | TEMPERATURE: 97.9 F

## 2021-12-21 DIAGNOSIS — Z94.81 STATUS POST BONE MARROW TRANSPLANT (H): ICD-10-CM

## 2021-12-21 LAB
ALBUMIN SERPL-MCNC: 3.6 G/DL (ref 3.4–5)
ALP SERPL-CCNC: 81 U/L (ref 40–150)
ALT SERPL W P-5'-P-CCNC: 33 U/L (ref 0–70)
ANION GAP SERPL CALCULATED.3IONS-SCNC: 7 MMOL/L (ref 3–14)
AST SERPL W P-5'-P-CCNC: 25 U/L (ref 0–45)
BASOPHILS # BLD AUTO: 0 10E3/UL (ref 0–0.2)
BASOPHILS NFR BLD AUTO: 1 %
BILIRUB SERPL-MCNC: 0.5 MG/DL (ref 0.2–1.3)
BUN SERPL-MCNC: 29 MG/DL (ref 7–30)
CALCIUM SERPL-MCNC: 9.5 MG/DL (ref 8.5–10.1)
CHLORIDE BLD-SCNC: 108 MMOL/L (ref 94–109)
CO2 SERPL-SCNC: 26 MMOL/L (ref 20–32)
CREAT SERPL-MCNC: 1.37 MG/DL (ref 0.66–1.25)
EOSINOPHIL # BLD AUTO: 0.3 10E3/UL (ref 0–0.7)
EOSINOPHIL NFR BLD AUTO: 5 %
ERYTHROCYTE [DISTWIDTH] IN BLOOD BY AUTOMATED COUNT: 13.4 % (ref 10–15)
GFR SERPL CREATININE-BSD FRML MDRD: 58 ML/MIN/1.73M2
GLUCOSE BLD-MCNC: 86 MG/DL (ref 70–99)
HCT VFR BLD AUTO: 31 % (ref 40–53)
HGB BLD-MCNC: 10.4 G/DL (ref 13.3–17.7)
IMM GRANULOCYTES # BLD: 0 10E3/UL
IMM GRANULOCYTES NFR BLD: 0 %
LYMPHOCYTES # BLD AUTO: 2.4 10E3/UL (ref 0.8–5.3)
LYMPHOCYTES NFR BLD AUTO: 38 %
MAGNESIUM SERPL-MCNC: 2 MG/DL (ref 1.6–2.3)
MCH RBC QN AUTO: 32.1 PG (ref 26.5–33)
MCHC RBC AUTO-ENTMCNC: 33.5 G/DL (ref 31.5–36.5)
MCV RBC AUTO: 96 FL (ref 78–100)
MONOCYTES # BLD AUTO: 0.9 10E3/UL (ref 0–1.3)
MONOCYTES NFR BLD AUTO: 14 %
NEUTROPHILS # BLD AUTO: 2.7 10E3/UL (ref 1.6–8.3)
NEUTROPHILS NFR BLD AUTO: 42 %
NRBC # BLD AUTO: 0 10E3/UL
NRBC BLD AUTO-RTO: 0 /100
PLATELET # BLD AUTO: 163 10E3/UL (ref 150–450)
POTASSIUM BLD-SCNC: 4.2 MMOL/L (ref 3.4–5.3)
PROT SERPL-MCNC: 7.2 G/DL (ref 6.8–8.8)
RBC # BLD AUTO: 3.24 10E6/UL (ref 4.4–5.9)
SODIUM SERPL-SCNC: 141 MMOL/L (ref 133–144)
WBC # BLD AUTO: 6.3 10E3/UL (ref 4–11)

## 2021-12-21 PROCEDURE — 36591 DRAW BLOOD OFF VENOUS DEVICE: CPT

## 2021-12-21 PROCEDURE — 85025 COMPLETE CBC W/AUTO DIFF WBC: CPT

## 2021-12-21 PROCEDURE — 250N000011 HC RX IP 250 OP 636: Performed by: PHYSICIAN ASSISTANT

## 2021-12-21 PROCEDURE — G0463 HOSPITAL OUTPT CLINIC VISIT: HCPCS | Mod: 25

## 2021-12-21 PROCEDURE — G0463 HOSPITAL OUTPT CLINIC VISIT: HCPCS

## 2021-12-21 PROCEDURE — 83735 ASSAY OF MAGNESIUM: CPT

## 2021-12-21 PROCEDURE — 99215 OFFICE O/P EST HI 40 MIN: CPT

## 2021-12-21 PROCEDURE — 80053 COMPREHEN METABOLIC PANEL: CPT

## 2021-12-21 RX ORDER — HEPARIN SODIUM (PORCINE) LOCK FLUSH IV SOLN 100 UNIT/ML 100 UNIT/ML
5 SOLUTION INTRAVENOUS ONCE
Status: COMPLETED | OUTPATIENT
Start: 2021-12-21 | End: 2021-12-21

## 2021-12-21 RX ADMIN — Medication 5 ML: at 12:50

## 2021-12-21 ASSESSMENT — PAIN SCALES - GENERAL: PAINLEVEL: NO PAIN (0)

## 2021-12-21 NOTE — LETTER
12/21/2021         RE: Nik Nava  [address] [facility] [address]        Dear Colleague,    Thank you for referring your patient, Nik Nava, to the [facility] BLOOD AND MARROW TRANSPLANT PROGRAM [place]. Please see a copy of my visit note below.    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 Rash improved with resuming steroids to upper back and face. No visible rash today. No other acute medical complaints. Curious about ongoing food restrictions. Energy level good. No n/v/d, good appetite.     Review of Systems                                                                                                                                         8 point Review of systems was o/w negative     PHYSICAL EXAM                                                                                                                                                 KPS: 80  Blood pressure 126/70, pulse 78, temperature 97.9  F (36.6  C), temperature source Oral, resp. rate 16, weight 104.6 kg (230 lb 9.6 oz), SpO2 99 %.    Wt Readings from Last 5 Encounters:   12/21/21 104.6 kg (230 lb 9.6 oz)   12/14/21 103.9 kg (229 lb)   12/07/21 105.1 kg (231 lb 12.8 oz)   11/30/21 104.5 kg (230 lb 6.4 oz)   11/18/21 104.4 kg (230 lb 3.2 oz)     General: NAD  HEENT: sclera anicteric and non-injected. OP moist without lesions.  No lip swelling.  Lungs: CTAB  Heart: RRR  Abd:  + BS. Soft. No tenderness with palpation  Skin: mild facial erythema but no other visible rashes.    Neuro: A&O x3  EXT: No LE edema   Vascular Access: port in the right chest     KPS=90  Current aGVHD staging:  Skin 0, UGI 0, LGI 0, Liver 0 (12/21/2021)    Labs:  Lab Results   Component Value Date    WBC 6.3 12/21/2021    ANEU 3.5 10/26/2021    HGB 10.4 (L) 12/21/2021    HCT 31.0 (L) 12/21/2021     12/21/2021      12/21/2021    POTASSIUM 4.2 12/21/2021    CHLORIDE 108 12/21/2021    CO2 26 12/21/2021    GLC 86 12/21/2021    BUN 29 12/21/2021    CR 1.37 (H) 12/21/2021    MAG 2.0 12/21/2021    INR 1.07 11/15/2021    BILITOTAL 0.5 12/21/2021    AST 25 12/21/2021    ALT 33 12/21/2021    ALKPHOS 81 12/21/2021    PROTTOTAL 7.2 12/21/2021    ALBUMIN 3.6 12/21/2021       I have assessed all abnormal lab values for their clinical significance and any values considered clinically significant have been addressed in the assessment and plan.        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 S/p allo sib PBSCT. (ABO matched)  HCT-CI score: 3 (prior solid tumor)  Day +100 bone marrow biopsy is 100% donor with no morphologic or flow cytometric evidence of leukemia BCR abl is pending.    2.  HEME:   - Transfuse for hgb <7g/dL; plt < 10k.  No transfusions needs  -  pulmonary emboli: He developed a pulmonary emboli in the setting of receiving PEG asparaginase. On Xarelto because this was a provoked thrombus. Continue for anticoagulation for now.       3.  FEN/Renal: Oral intake good/stable  - Cr improved on lower tac.     - Tac induced hypomag, decrease to 2tabs per day 12/14.   - He has a history of renal cancer and resection. has a single kidney.    4.    GVHD:   - On tac 2mg BID, with levels consistently sub-therapeutic. Have been holding off tapering tac d/t slight facial and upper back rash that has been responsive to steroid creams. Drop tac to 1.5mg/2mg 12/14 and continue steroid creams. Facial rash seems stable. Given formal tac taper 12/21  - skin biopsied 8/30 (back)-subtle interface dermatitis, suggestive of gvhd? Back without any rash 12/21    5.  ID:   - prophy acv  (letrmovir stopped at day +100), fluconazole, Pentamidine (12/7)  - CMV neg 11/12- repeat pending.   - influenza vacc given 10/26.  Covid vaccine given November 18- given second dose 12/14     6. Hx of graves disease; On synthroid 175 mcg daily     7. CV: Norvasc 5mg daily. BP stable today, likely will need to taper as on tac taper.    8. GI: omeprazole for heartburn        Plan: stop oral magnesium  Start tac taper schedule    RTC: 1 week.     40 minutes spent on the date of the encounter doing chart review, review of test results, interpretation of tests, patient visit, documentation and discussion with other provider(s)      Layla Sahni PAC  [phone]      Again, thank you for allowing me to participate in the care of your patient.        Sincerely,        BMT Advanced Practice Provider

## 2021-12-21 NOTE — NURSING NOTE
"Oncology Rooming Note    December 21, 2021 1:22 PM   Nik Nava is a 61 year old male who presents for:    Chief Complaint   Patient presents with     Port Draw     Labs drawn from port by RN in lab. VS taken.      Oncology Clinic Visit     ALL      Initial Vitals: /70 (BP Location: Left arm, Patient Position: Sitting, Cuff Size: Adult Regular)   Pulse 78   Temp 97.9  F (36.6  C) (Oral)   Resp 16   Wt 104.6 kg (230 lb 9.6 oz)   SpO2 99%   BMI 30.42 kg/m   Estimated body mass index is 30.42 kg/m  as calculated from the following:    Height as of 11/15/21: 1.854 m (6' 1\").    Weight as of this encounter: 104.6 kg (230 lb 9.6 oz). Body surface area is 2.32 meters squared.  No Pain (0) Comment: Data Unavailable   No LMP for male patient.  Allergies reviewed: Yes  Medications reviewed: Yes    Medications: Medication refills not needed today.  Pharmacy name entered into [employer]:    [facility] - [place], [place] - [address] [facility] [place], [place] - [phone]    Clinical concerns: No new concern today      Pat Abernathy CMA December 21, 2021  1:23 PM             "

## 2021-12-21 NOTE — PROGRESS NOTES
Tacrolimus Taper Calendar    Silvestre Monday Tuesday Wednesday Thursday Friday Saturday   19-Dec-2021 20-Dec-2021 21-Dec-2021 22-Dec-2021 23-Dec-2021 24-Dec-2021 25-Dec-2021   1.5mg AM      2mg PM 1.5mg AM      2mg PM 1.5mg AM      1.5mg PM 1.5mg AM      1.5mg PM 1.5mg AM      1.5mg PM 1.5mg AM      1.5mg PM 1.5mg AM      1.5mg PM   26-Dec-2021 27-Dec-2021 28-Dec-2021 29-Dec-2021 30-Dec-2021 31-Dec-2021 1-Loyd-2022   1.5mg AM      1.5mg PM 1.5mg AM      1.5mg PM 1mg AM      1.5mg PM 1mg AM      1.5mg PM 1mg AM      1.5mg PM 1mg AM      1.5mg PM 1mg AM      1.5mg PM   2-Jan-2022 3-Jan-2022 4-Jan-2022 5-Jan-2022 6-Jan-2022 7-Jan-2022 8-Jan-2022   1mg AM      1.5mg PM 1mg AM      1.5mg PM 1mg AM        1mg PM 1mg AM        1mg PM 1mg AM        1mg PM 1mg AM        1mg PM 1mg AM        1mg PM   9-Loyd-2022 10-Loyd-2022 11-Loyd-2022 12-Jan-2022 13-Loyd-2022 14-Loyd-2022 15-Loyd-2022   1mg AM        1mg PM 1mg AM        1mg PM 0.5mg AM        1mg PM 0.5mg AM        1mg PM 0.5mg AM        1mg PM 0.5mg AM        1mg PM 0.5mg AM        1mg PM   16-Jan-2022 17-Jan-2022 18-Jan-2022 19-Jan-2022 20-Jan-2022 21-Jan-2022 22-Jan-2022   0.5mg AM        1mg PM 0.5mg AM        1mg PM 0.5mg AM        0.5mg PM 0.5mg AM        1mg PM 0.5mg AM        0.5mg PM 0.5mg AM        1mg PM 0.5mg AM        0.5mg PM   23-Jan-2022 24-Jan-2022 25-Jan-2022 26-Jan-2022 27-Jan-2022 28-Jan-2022 29-Jan-2022   0.5mg AM        1mg PM 0.5mg AM        0.5mg PM 0.5mg AM        0.5mg PM 0.5mg AM        0.5mg PM 0.5mg AM        0.5mg PM 0.5mg AM        0.5mg PM 0.5mg AM        0.5mg PM   30-Jan-2022 31-Jan-2022 1-Feb-2022 2-Feb-2022 3-Feb-2022 4-Feb-2022 5-Feb-2022   0.5mg AM        0.5mg PM 0.5mg AM        0.5mg PM 0.5mg AM 0.5mg AM        0.5mg PM 0.5mg AM 0.5mg AM        0.5mg PM 0.5mg AM   6-Feb-2022 7-Feb-2022 8-Feb-2022 9-Feb-2022 10-Feb-2022 11-Feb-2022 12-Feb-2022   0.5mg AM        0.5mg PM 0.5mg AM 0.5mg AM 0.5mg AM 0.5mg AM 0.5mg AM 0.5mg AM   13-Feb-2022  14-Feb-2022 15-Feb-2022 16-Feb-2022 17-Feb-2022 18-Feb-2022 19-Feb-2022   0.5mg AM 0.5mg AM 0 0.5mg AM 0 0.5mg AM 0   20-Feb-2022 21-Feb-2022 22-Feb-2022 23-Feb-2022 24-Feb-2022 25-Feb-2022 26-Feb-2022   0.5mg AM STOP        27-Feb-2022 28-Feb-2022 1-Mar-2022 2-Mar-2022 3-Mar-2022 4-Mar-2022 5-Mar-2022            6-Mar-2022 7-Mar-2022 8-Mar-2022 9-Mar-2022 10-Mar-2022 11-Mar-2022 12-Mar-2022

## 2021-12-21 NOTE — LETTER
Date:January 4, 2022      Provider requested that no letter be sent. Do not send.       [facility]       Additional Notes: Patient has an appointment with Alexis Cha tomorrow for Accutane consult and possible start. Render Risk Assessment In Note?: no Detail Level: Zone

## 2021-12-21 NOTE — PROGRESS NOTES
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 Rash improved with resuming steroids to upper back and face. No visible rash today. No other acute medical complaints. Curious about ongoing food restrictions. Energy level good. No n/v/d, good appetite.     Review of Systems                                                                                                                                         8 point Review of systems was o/w negative     PHYSICAL EXAM                                                                                                                                                 KPS: 80  Blood pressure 126/70, pulse 78, temperature 97.9  F (36.6  C), temperature source Oral, resp. rate 16, weight 104.6 kg (230 lb 9.6 oz), SpO2 99 %.    Wt Readings from Last 5 Encounters:   12/21/21 104.6 kg (230 lb 9.6 oz)   12/14/21 103.9 kg (229 lb)   12/07/21 105.1 kg (231 lb 12.8 oz)   11/30/21 104.5 kg (230 lb 6.4 oz)   11/18/21 104.4 kg (230 lb 3.2 oz)     General: NAD  HEENT: sclera anicteric and non-injected. OP moist without lesions.  No lip swelling.  Lungs: CTAB  Heart: RRR  Abd:  + BS. Soft. No tenderness with palpation  Skin: mild facial erythema but no other visible rashes.    Neuro: A&O x3  EXT: No LE edema   Vascular Access: port in the right chest     KPS=90  Current aGVHD staging:  Skin 0, UGI 0, LGI 0, Liver 0 (12/21/2021)    Labs:  Lab Results   Component Value Date    WBC 6.3 12/21/2021    ANEU 3.5 10/26/2021    HGB 10.4 (L) 12/21/2021    HCT 31.0 (L) 12/21/2021     12/21/2021     12/21/2021    POTASSIUM 4.2 12/21/2021    CHLORIDE 108 12/21/2021    CO2 26 12/21/2021    GLC 86 12/21/2021    BUN 29 12/21/2021    CR 1.37 (H) 12/21/2021    MAG 2.0 12/21/2021    INR 1.07 11/15/2021    BILITOTAL 0.5 12/21/2021    AST 25 12/21/2021    ALT 33 12/21/2021    ALKPHOS 81  12/21/2021    PROTTOTAL 7.2 12/21/2021    ALBUMIN 3.6 12/21/2021       I have assessed all abnormal lab values for their clinical significance and any values considered clinically significant have been addressed in the assessment and plan.        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 S/p allo sib PBSCT. (ABO matched)  HCT-CI score: 3 (prior solid tumor)  Day +100 bone marrow biopsy is 100% donor with no morphologic or flow cytometric evidence of leukemia BCR abl is pending.    2.  HEME:   - Transfuse for hgb <7g/dL; plt < 10k.  No transfusions needs  -  pulmonary emboli: He developed a pulmonary emboli in the setting of receiving PEG asparaginase. On Xarelto because this was a provoked thrombus. Continue for anticoagulation for now.       3.  FEN/Renal: Oral intake good/stable  - Cr improved on lower tac.     - Tac induced hypomag, decrease to 2tabs per day 12/14.   - He has a history of renal cancer and resection. has a single kidney.    4.    GVHD:   - On tac 2mg BID, with levels consistently sub-therapeutic. Have been holding off tapering tac d/t slight facial and upper back rash that has been responsive to steroid creams. Drop tac to 1.5mg/2mg 12/14 and continue steroid creams. Facial rash seems stable. Given formal tac taper 12/21  - skin biopsied 8/30 (back)-subtle interface dermatitis, suggestive of gvhd? Back without any rash 12/21    5.  ID:   - prophy acv (letrmovir stopped at day +100), fluconazole, Pentamidine (12/7)  - CMV neg 11/12- repeat pending.   - influenza vacc given 10/26.  Covid vaccine given November 18- given second dose 12/14     6. Hx of graves disease; On synthroid 175 mcg daily     7. CV: Norvasc 5mg daily. BP stable  today, likely will need to taper as on tac taper.    8. GI: omeprazole for heartburn        Plan: stop oral magnesium  Start tac taper schedule    RTC: 1 week.     40 minutes spent on the date of the encounter doing chart review, review of test results, interpretation of tests, patient visit, documentation and discussion with other provider(s)      Layla Sahni PAC  [phone]

## 2021-12-21 NOTE — NURSING NOTE
Chief Complaint   Patient presents with     Port Draw     Labs drawn from port by RN in lab. VS taken.      Port accessed with 20g gripper needle by RN, labs collected, line flushed with saline and heparin.  Vitals taken. Pt checked in for appointment(s).  Sahil Philip RN

## 2022-01-04 ENCOUNTER — APPOINTMENT (OUTPATIENT)
Dept: LAB | Facility: CLINIC | Age: 62
End: 2022-01-04
Attending: STUDENT IN AN ORGANIZED HEALTH CARE EDUCATION/TRAINING PROGRAM
Payer: COMMERCIAL

## 2022-01-04 ENCOUNTER — ONCOLOGY VISIT (OUTPATIENT)
Dept: TRANSPLANT | Facility: CLINIC | Age: 62
End: 2022-01-04
Attending: STUDENT IN AN ORGANIZED HEALTH CARE EDUCATION/TRAINING PROGRAM
Payer: COMMERCIAL

## 2022-01-04 VITALS
TEMPERATURE: 97.8 F | HEART RATE: 72 BPM | RESPIRATION RATE: 16 BRPM | BODY MASS INDEX: 30.54 KG/M2 | SYSTOLIC BLOOD PRESSURE: 148 MMHG | WEIGHT: 231.5 LBS | DIASTOLIC BLOOD PRESSURE: 84 MMHG | OXYGEN SATURATION: 99 %

## 2022-01-04 DIAGNOSIS — Z94.81 STATUS POST BONE MARROW TRANSPLANT (H): ICD-10-CM

## 2022-01-04 LAB
ALBUMIN SERPL-MCNC: 3.6 G/DL (ref 3.4–5)
ALP SERPL-CCNC: 101 U/L (ref 40–150)
ALT SERPL W P-5'-P-CCNC: 57 U/L (ref 0–70)
ANION GAP SERPL CALCULATED.3IONS-SCNC: 8 MMOL/L (ref 3–14)
AST SERPL W P-5'-P-CCNC: 44 U/L (ref 0–45)
BASOPHILS # BLD AUTO: 0 10E3/UL (ref 0–0.2)
BASOPHILS NFR BLD AUTO: 1 %
BILIRUB SERPL-MCNC: 0.4 MG/DL (ref 0.2–1.3)
BUN SERPL-MCNC: 28 MG/DL (ref 7–30)
CALCIUM SERPL-MCNC: 9.6 MG/DL (ref 8.5–10.1)
CHLORIDE BLD-SCNC: 109 MMOL/L (ref 94–109)
CO2 SERPL-SCNC: 24 MMOL/L (ref 20–32)
CREAT SERPL-MCNC: 1.31 MG/DL (ref 0.66–1.25)
EOSINOPHIL # BLD AUTO: 0.4 10E3/UL (ref 0–0.7)
EOSINOPHIL NFR BLD AUTO: 6 %
ERYTHROCYTE [DISTWIDTH] IN BLOOD BY AUTOMATED COUNT: 14.3 % (ref 10–15)
GFR SERPL CREATININE-BSD FRML MDRD: 61 ML/MIN/1.73M2
GLUCOSE BLD-MCNC: 98 MG/DL (ref 70–99)
HCT VFR BLD AUTO: 36.2 % (ref 40–53)
HGB BLD-MCNC: 11.8 G/DL (ref 13.3–17.7)
IMM GRANULOCYTES # BLD: 0 10E3/UL
IMM GRANULOCYTES NFR BLD: 0 %
LYMPHOCYTES # BLD AUTO: 2.8 10E3/UL (ref 0.8–5.3)
LYMPHOCYTES NFR BLD AUTO: 45 %
MAGNESIUM SERPL-MCNC: 1.8 MG/DL (ref 1.6–2.3)
MCH RBC QN AUTO: 31.5 PG (ref 26.5–33)
MCHC RBC AUTO-ENTMCNC: 32.6 G/DL (ref 31.5–36.5)
MCV RBC AUTO: 97 FL (ref 78–100)
MONOCYTES # BLD AUTO: 0.7 10E3/UL (ref 0–1.3)
MONOCYTES NFR BLD AUTO: 11 %
NEUTROPHILS # BLD AUTO: 2.3 10E3/UL (ref 1.6–8.3)
NEUTROPHILS NFR BLD AUTO: 37 %
NRBC # BLD AUTO: 0 10E3/UL
NRBC BLD AUTO-RTO: 0 /100
PLATELET # BLD AUTO: 151 10E3/UL (ref 150–450)
POTASSIUM BLD-SCNC: 4.7 MMOL/L (ref 3.4–5.3)
PROT SERPL-MCNC: 7.4 G/DL (ref 6.8–8.8)
RBC # BLD AUTO: 3.75 10E6/UL (ref 4.4–5.9)
SODIUM SERPL-SCNC: 141 MMOL/L (ref 133–144)
WBC # BLD AUTO: 6.4 10E3/UL (ref 4–11)

## 2022-01-04 PROCEDURE — 82306 VITAMIN D 25 HYDROXY: CPT

## 2022-01-04 PROCEDURE — 250N000011 HC RX IP 250 OP 636: Performed by: STUDENT IN AN ORGANIZED HEALTH CARE EDUCATION/TRAINING PROGRAM

## 2022-01-04 PROCEDURE — 85025 COMPLETE CBC W/AUTO DIFF WBC: CPT

## 2022-01-04 PROCEDURE — 36591 DRAW BLOOD OFF VENOUS DEVICE: CPT

## 2022-01-04 PROCEDURE — 83735 ASSAY OF MAGNESIUM: CPT

## 2022-01-04 PROCEDURE — 80053 COMPREHEN METABOLIC PANEL: CPT

## 2022-01-04 PROCEDURE — 99215 OFFICE O/P EST HI 40 MIN: CPT

## 2022-01-04 PROCEDURE — 82040 ASSAY OF SERUM ALBUMIN: CPT

## 2022-01-04 RX ORDER — AMOXICILLIN 500 MG/1
1000 TABLET, FILM COATED ORAL 2 TIMES DAILY
Qty: 2 TABLET | Refills: 0 | Status: SHIPPED | OUTPATIENT
Start: 2022-01-04 | End: 2022-01-04

## 2022-01-04 RX ORDER — TACROLIMUS 0.5 MG/1
CAPSULE ORAL
Qty: 40 CAPSULE | Refills: 0 | Status: SHIPPED | OUTPATIENT
Start: 2022-01-04 | End: 2022-02-07

## 2022-01-04 RX ORDER — CLOTRIMAZOLE 10 MG/1
10 LOZENGE ORAL 4 TIMES DAILY
COMMUNITY
End: 2022-03-31

## 2022-01-04 RX ORDER — AMOXICILLIN 500 MG/1
TABLET, FILM COATED ORAL
Qty: 4 TABLET | Refills: 1 | Status: SHIPPED | OUTPATIENT
Start: 2022-01-04 | End: 2022-03-09

## 2022-01-04 RX ORDER — HEPARIN SODIUM (PORCINE) LOCK FLUSH IV SOLN 100 UNIT/ML 100 UNIT/ML
500 SOLUTION INTRAVENOUS ONCE
Status: COMPLETED | OUTPATIENT
Start: 2022-01-04 | End: 2022-01-04

## 2022-01-04 RX ADMIN — Medication 500 UNITS: at 09:56

## 2022-01-04 ASSESSMENT — PAIN SCALES - GENERAL: PAINLEVEL: NO PAIN (0)

## 2022-01-04 NOTE — LETTER
Date:January 7, 2022      Provider requested that no letter be sent. Do not send.       [facility]

## 2022-01-04 NOTE — NURSING NOTE
"Chief Complaint   Patient presents with     Port Draw     Labs drawn via port by RN in lab.  VS taken       Port accessed with 20 gauge 3/4\" gripper needle by RN, labs collected, line flushed with saline and heparin, then de-accessed.  Vitals taken. Pt checked in for appointment(s).    Rajni Menezes RN    "

## 2022-01-04 NOTE — NURSING NOTE
"Oncology Rooming Note    January 4, 2022 10:35 AM   Nik Naav is a 61 year old male who presents for:    Chief Complaint   Patient presents with     Port Draw     Labs drawn via port by RN in lab.  VS taken     RECHECK     ALL here for provider visit     Initial Vitals: BP (!) 148/84   Pulse 72   Temp 97.8  F (36.6  C) (Oral)   Resp 16   Wt 105 kg (231 lb 8 oz)   SpO2 99%   BMI 30.54 kg/m   Estimated body mass index is 30.54 kg/m  as calculated from the following:    Height as of 11/15/21: 1.854 m (6' 1\").    Weight as of this encounter: 105 kg (231 lb 8 oz). Body surface area is 2.33 meters squared.  No Pain (0) Comment: Data Unavailable   No LMP for male patient.  Allergies reviewed: Yes  Medications reviewed: Yes    Medications: MEDICATION REFILLS NEEDED TODAY. Provider was notified.  Pharmacy name entered into [facility]:    [facility] - [place], [place] - [address] [facility] [place], [place] - [phone]    Clinical concerns: None      Nico Tovar RN              "

## 2022-01-04 NOTE — LETTER
1/4/2022         RE: Nik Nava  [id number] [address] [id number]        Dear Colleague,    Thank you for referring your patient, Nik Nava, to the [facility] [place]. Please see a copy of my visit note below.    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 Facial rash still noticeable to him and his wife, especially around eyes and mouth. Still using topical steroid to face. Seen by his primary care provider this last week for sore throat and white spots on uvula. Swabbed negative for strep and diagnosed with thrush, still has white bumps on lips. On clotrimazole with significant improvement. Energy down this week, he would like to resume vitamin D as this has helped his mood in previous reno.  No n/v/d. No rectal pain but some hemorrhoid bleeding on toilet tissue only. On xarelto.   No new rashes.    Review of Systems                                                                                                                                         8 point Review of systems was o/w negative     PHYSICAL EXAM                                                                                                                                                 KPS: 80  Blood pressure (!) 148/84, pulse 72, temperature 97.8  F (36.6  C), temperature source Oral, resp. rate 16, weight 105 kg (231 lb 8 oz), SpO2 99 %.    Wt Readings from Last 5 Encounters:   01/04/22 105 kg (231 lb 8 oz)   12/21/21 104.6 kg (230 lb 9.6 oz)   12/14/21 103.9 kg (229 lb)   12/07/21 105.1 kg (231 lb 12.8 oz)   11/30/21 104.5 kg (230 lb 6.4 oz)     General: NAD  HEENT: sclera anicteric and non-injected. OP moist without lesions. No stuck on white lesions seen today on OP. Lips with some mild swelling and few scattered bumps flesh colored).   Lungs: CTAB  Heart:  RRR  Abd:  + BS. Soft. No tenderness with palpation  Skin: mild facial erythema but no other visible rashes.    Neuro: A&O x3  EXT: No LE edema   Vascular Access: port in the right chest     KPS=90  Current aGVHD staging:  Skin 0, UGI 0, LGI 0, Liver 0 (1/4/2022)    Labs:  Lab Results   Component Value Date    WBC 6.4 01/04/2022    ANEU 3.5 10/26/2021    HGB 11.8 (L) 01/04/2022    HCT 36.2 (L) 01/04/2022     01/04/2022     01/04/2022    POTASSIUM 4.7 01/04/2022    CHLORIDE 109 01/04/2022    CO2 24 01/04/2022    GLC 98 01/04/2022    BUN 28 01/04/2022    CR 1.31 (H) 01/04/2022    MAG 1.8 01/04/2022    INR 1.07 11/15/2021    BILITOTAL 0.4 01/04/2022    AST 44 01/04/2022    ALT 57 01/04/2022    ALKPHOS 101 01/04/2022    PROTTOTAL 7.4 01/04/2022    ALBUMIN 3.6 01/04/2022       I have assessed all abnormal lab values for their clinical significance and any values considered clinically significant have been addressed in the assessment and plan.        ASSESSMENT AND PLAN   Nik Nava is a 61 year old male with Ph Pos ALL, day 147 s/p sib allo stem cell transplant    Day -6 (8/4): flu/cy  Day -5 through day -2 (8/5-8/8): flu  Day -1 (8/9): TBI  Day 0 (8/10): transplant     1.  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 S/p allo sib PBSCT. (ABO matched)  HCT-CI score: 3 (prior solid tumor)  Day +100 bone marrow biopsy is 100% donor with no morphologic or flow cytometric evidence of leukemia BCR abl is pending.    2.  HEME:   - Transfuse for hgb <7g/dL; plt < 10k.  No transfusions needs  -  pulmonary emboli: He developed a pulmonary emboli in the setting of receiving PEG asparaginase. On Xarelto because this was a provoked thrombus. Continue for anticoagulation for now.       3.  FEN/Renal: Oral intake good/stable  - Cr improved on lower tac.     - Tac induced hypomag, decrease to 2tabs per day 12/14.   - He has a history of renal cancer and resection. has a single  kidney.    4.    GVHD:   - On tac 2mg BID, with levels consistently sub-therapeutic. Have been holding off tapering tac d/t slight facial and upper back rash that has been responsive to steroid creams.  Given formal tac taper 12/21  - skin biopsied 8/30 (back)-subtle interface dermatitis, suggestive of gvhd? Back without any rash 12/21    5.  ID: Very likely lip thrush from topical steroid cream for weeks. Concern for topical steroids being wiped around eyes. Patient should STOP applying topical steroids to his face as rash is resolved and steroids are causing more of risk of thrush than concern for rash at this time. Complete clotrimazole   - prophy acv (letrmovir stopped at day +100), fluconazole, Pentamidine (12/7). Requested inhalation pentamidine at [facility] within next week  - CMV neg 11/12- repeat pending.   - influenza vacc given 10/26.  Covid vaccine given November 18- given second dose 12/14   Amoxicillin for dental appt next week, given    6. Hx of graves disease; On synthroid 175 mcg daily     7. CV: Norvasc 5mg daily. BP stable today, likely will need to taper as on tac taper.    8. GI: omeprazole for heartburn        Plan: Continue tac taper  STOP topical steroid cream    RTC: Pentamidine in next week (requested appt at [facility])  2 weeks BMT    40 minutes spent on the date of the encounter doing chart review, review of test results, interpretation of tests, patient visit, documentation and discussion with other provider(s)      Layla Sahni [facility]  [phone]      Again, thank you for allowing me to participate in the care of your patient.        Sincerely,        BMT Advanced Practice Provider

## 2022-01-04 NOTE — PROGRESS NOTES
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 Facial rash still noticeable to him and his wife, especially around eyes and mouth. Still using topical steroid to face. Seen by his primary care provider this last week for sore throat and white spots on uvula. Swabbed negative for strep and diagnosed with thrush, still has white bumps on lips. On clotrimazole with significant improvement. Energy down this week, he would like to resume vitamin D as this has helped his mood in previous reno.  No n/v/d. No rectal pain but some hemorrhoid bleeding on toilet tissue only. On xarelto.   No new rashes.    Review of Systems                                                                                                                                         8 point Review of systems was o/w negative     PHYSICAL EXAM                                                                                                                                                 KPS: 80  Blood pressure (!) 148/84, pulse 72, temperature 97.8  F (36.6  C), temperature source Oral, resp. rate 16, weight 105 kg (231 lb 8 oz), SpO2 99 %.    Wt Readings from Last 5 Encounters:   01/04/22 105 kg (231 lb 8 oz)   12/21/21 104.6 kg (230 lb 9.6 oz)   12/14/21 103.9 kg (229 lb)   12/07/21 105.1 kg (231 lb 12.8 oz)   11/30/21 104.5 kg (230 lb 6.4 oz)     General: NAD  HEENT: sclera anicteric and non-injected. OP moist without lesions. No stuck on white lesions seen today on OP. Lips with some mild swelling and few scattered bumps flesh colored).   Lungs: CTAB  Heart: RRR  Abd:  + BS. Soft. No tenderness with palpation  Skin: mild facial erythema but no other visible rashes.    Neuro: A&O x3  EXT: No LE edema   Vascular Access: port in the right chest     KPS=90  Current aGVHD staging:  Skin 0, UGI 0, LGI 0, Liver 0 (1/4/2022)    Labs:  Lab Results    Component Value Date    WBC 6.4 01/04/2022    ANEU 3.5 10/26/2021    HGB 11.8 (L) 01/04/2022    HCT 36.2 (L) 01/04/2022     01/04/2022     01/04/2022    POTASSIUM 4.7 01/04/2022    CHLORIDE 109 01/04/2022    CO2 24 01/04/2022    GLC 98 01/04/2022    BUN 28 01/04/2022    CR 1.31 (H) 01/04/2022    MAG 1.8 01/04/2022    INR 1.07 11/15/2021    BILITOTAL 0.4 01/04/2022    AST 44 01/04/2022    ALT 57 01/04/2022    ALKPHOS 101 01/04/2022    PROTTOTAL 7.4 01/04/2022    ALBUMIN 3.6 01/04/2022       I have assessed all abnormal lab values for their clinical significance and any values considered clinically significant have been addressed in the assessment and plan.        ASSESSMENT AND PLAN   Nik Nava is a 61 year old male with Ph Pos ALL, day 147 s/p sib allo stem cell transplant    Day -6 (8/4): flu/cy  Day -5 through day -2 (8/5-8/8): flu  Day -1 (8/9): TBI  Day 0 (8/10): transplant     1.  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 S/p allo sib PBSCT. (ABO matched)  HCT-CI score: 3 (prior solid tumor)  Day +100 bone marrow biopsy is 100% donor with no morphologic or flow cytometric evidence of leukemia BCR abl is pending.    2.  HEME:   - Transfuse for hgb <7g/dL; plt < 10k.  No transfusions needs  -  pulmonary emboli: He developed a pulmonary emboli in the setting of receiving PEG asparaginase. On Xarelto because this was a provoked thrombus. Continue for anticoagulation for now.       3.  FEN/Renal: Oral intake good/stable  - Cr improved on lower tac.     - Tac induced hypomag, decrease to 2tabs per day 12/14.   - He has a history of renal cancer and resection. has a single kidney.    4.    GVHD:   - On tac 2mg BID, with levels consistently sub-therapeutic. Have been holding off tapering tac d/t slight facial and upper back rash that has been responsive to steroid creams.  Given formal tac taper 12/21  - skin biopsied 8/30 (back)-subtle interface dermatitis,  suggestive of gvhd? Back without any rash 12/21    5.  ID: Very likely lip thrush from topical steroid cream for weeks. Concern for topical steroids being wiped around eyes. Patient should STOP applying topical steroids to his face as rash is resolved and steroids are causing more of risk of thrush than concern for rash at this time. Complete clotrimazole   - prophy acv (letrmovir stopped at day +100), fluconazole, Pentamidine (12/7). Requested inhalation pentamidine at [facility] within next week  - CMV neg 11/12- repeat pending.   - influenza vacc given 10/26.  Covid vaccine given November 18- given second dose 12/14   Amoxicillin for dental appt next week, given    6. Hx of graves disease; On synthroid 175 mcg daily     7. CV: Norvasc 5mg daily. BP stable today, likely will need to taper as on tac taper.    8. GI: omeprazole for heartburn        Plan: Continue tac taper  STOP topical steroid cream    RTC: Pentamidine in next week (requested appt at [facility])  2 weeks BMT    40 minutes spent on the date of the encounter doing chart review, review of test results, interpretation of tests, patient visit, documentation and discussion with other provider(s)      Layla Sahni PAC  [phone]

## 2022-01-05 LAB
CMV DNA SPEC NAA+PROBE-ACNC: NOT DETECTED IU/ML
DEPRECATED CALCIDIOL+CALCIFEROL SERPL-MC: <52 UG/L (ref 20–75)
VITAMIN D2 SERPL-MCNC: <5 UG/L
VITAMIN D3 SERPL-MCNC: 47 UG/L

## 2022-01-06 ENCOUNTER — CARE COORDINATION (OUTPATIENT)
Dept: TRANSPLANT | Facility: CLINIC | Age: 62
End: 2022-01-06
Payer: COMMERCIAL

## 2022-01-06 NOTE — PROGRESS NOTES
Pt had requested that his pentamidine be done at [facility] with his oncologist. I did call today and faxed orders to his nurse  Who will try to arrange. She knows to call if this is not possible.

## 2022-01-14 ENCOUNTER — ONCOLOGY VISIT (OUTPATIENT)
Dept: ONCOLOGY | Facility: CLINIC | Age: 62
End: 2022-01-14
Payer: COMMERCIAL

## 2022-01-14 DIAGNOSIS — Z94.81 STATUS POST BONE MARROW TRANSPLANT (H): Primary | ICD-10-CM

## 2022-01-14 DIAGNOSIS — Z94.81 STATUS POST BONE MARROW TRANSPLANT (H): ICD-10-CM

## 2022-01-14 LAB
ALBUMIN SERPL-MCNC: 3.7 G/DL (ref 3.4–5)
ALP SERPL-CCNC: 91 U/L (ref 40–150)
ALT SERPL W P-5'-P-CCNC: 72 U/L (ref 0–70)
ANION GAP SERPL CALCULATED.3IONS-SCNC: 6 MMOL/L (ref 3–14)
AST SERPL W P-5'-P-CCNC: 61 U/L (ref 0–45)
BASOPHILS # BLD AUTO: 0.1 10E3/UL (ref 0–0.2)
BASOPHILS NFR BLD AUTO: 1 %
BILIRUB SERPL-MCNC: 0.6 MG/DL (ref 0.2–1.3)
BUN SERPL-MCNC: 31 MG/DL (ref 7–30)
CALCIUM SERPL-MCNC: 9.4 MG/DL (ref 8.5–10.1)
CHLORIDE BLD-SCNC: 106 MMOL/L (ref 94–109)
CO2 SERPL-SCNC: 26 MMOL/L (ref 20–32)
CREAT SERPL-MCNC: 1.29 MG/DL (ref 0.66–1.25)
EOSINOPHIL # BLD AUTO: 0.6 10E3/UL (ref 0–0.7)
EOSINOPHIL NFR BLD AUTO: 11 %
ERYTHROCYTE [DISTWIDTH] IN BLOOD BY AUTOMATED COUNT: 14.5 % (ref 10–15)
GFR SERPL CREATININE-BSD FRML MDRD: 62 ML/MIN/1.73M2
GLUCOSE BLD-MCNC: 95 MG/DL (ref 70–99)
HCT VFR BLD AUTO: 37.4 % (ref 40–53)
HGB BLD-MCNC: 12.3 G/DL (ref 13.3–17.7)
IMM GRANULOCYTES # BLD: 0 10E3/UL
IMM GRANULOCYTES NFR BLD: 0 %
LYMPHOCYTES # BLD AUTO: 2.2 10E3/UL (ref 0.8–5.3)
LYMPHOCYTES NFR BLD AUTO: 38 %
MCH RBC QN AUTO: 31.9 PG (ref 26.5–33)
MCHC RBC AUTO-ENTMCNC: 32.9 G/DL (ref 31.5–36.5)
MCV RBC AUTO: 97 FL (ref 78–100)
MONOCYTES # BLD AUTO: 0.8 10E3/UL (ref 0–1.3)
MONOCYTES NFR BLD AUTO: 14 %
NEUTROPHILS # BLD AUTO: 2.1 10E3/UL (ref 1.6–8.3)
NEUTROPHILS NFR BLD AUTO: 36 %
NRBC # BLD AUTO: 0 10E3/UL
NRBC BLD AUTO-RTO: 0 /100
PLATELET # BLD AUTO: 116 10E3/UL (ref 150–450)
POTASSIUM BLD-SCNC: 4.4 MMOL/L (ref 3.4–5.3)
PROT SERPL-MCNC: 7.4 G/DL (ref 6.8–8.8)
RBC # BLD AUTO: 3.86 10E6/UL (ref 4.4–5.9)
SODIUM SERPL-SCNC: 138 MMOL/L (ref 133–144)
WBC # BLD AUTO: 5.9 10E3/UL (ref 4–11)

## 2022-01-14 PROCEDURE — 80053 COMPREHEN METABOLIC PANEL: CPT | Performed by: INTERNAL MEDICINE

## 2022-01-14 PROCEDURE — 36591 DRAW BLOOD OFF VENOUS DEVICE: CPT

## 2022-01-14 PROCEDURE — 85025 COMPLETE CBC W/AUTO DIFF WBC: CPT | Performed by: INTERNAL MEDICINE

## 2022-01-14 RX ORDER — HEPARIN SODIUM (PORCINE) LOCK FLUSH IV SOLN 100 UNIT/ML 100 UNIT/ML
500 SOLUTION INTRAVENOUS ONCE
Status: COMPLETED | OUTPATIENT
Start: 2022-01-14 | End: 2022-01-14

## 2022-01-14 RX ADMIN — HEPARIN SODIUM (PORCINE) LOCK FLUSH IV SOLN 100 UNIT/ML 500 UNITS: 100 SOLUTION at 10:38

## 2022-01-14 NOTE — PROGRESS NOTES
Port accessed with no incident. Blood return noted. Port flushed with saline and heparin. Patient tolerated port flush well. Port was de-accessed. Please refer to flow sheets for more information.  Beth Marley RN on 1/14/2022 at 10:48 AM

## 2022-01-15 ENCOUNTER — HOSPITAL ENCOUNTER (EMERGENCY)
Facility: CLINIC | Age: 62
Discharge: HOME | End: 2022-01-15
Attending: EMERGENCY MEDICINE | Admitting: EMERGENCY MEDICINE
Payer: COMMERCIAL

## 2022-01-15 VITALS
RESPIRATION RATE: 18 BRPM | DIASTOLIC BLOOD PRESSURE: 84 MMHG | OXYGEN SATURATION: 93 % | TEMPERATURE: 98.1 F | BODY MASS INDEX: 29.82 KG/M2 | HEART RATE: 70 BPM | HEIGHT: 73 IN | WEIGHT: 225 LBS | SYSTOLIC BLOOD PRESSURE: 141 MMHG

## 2022-01-15 DIAGNOSIS — R74.01 TRANSAMINITIS: ICD-10-CM

## 2022-01-15 DIAGNOSIS — D69.6 THROMBOCYTOPENIA (H): ICD-10-CM

## 2022-01-15 LAB
FLUAV RNA SPEC QL NAA+PROBE: NEGATIVE
FLUBV RNA RESP QL NAA+PROBE: NEGATIVE
RSV RNA SPEC NAA+PROBE: NEGATIVE
SARS-COV-2 RNA RESP QL NAA+PROBE: NEGATIVE

## 2022-01-15 PROCEDURE — 87637 SARSCOV2&INF A&B&RSV AMP PRB: CPT | Performed by: EMERGENCY MEDICINE

## 2022-01-15 PROCEDURE — 99283 EMERGENCY DEPT VISIT LOW MDM: CPT | Performed by: EMERGENCY MEDICINE

## 2022-01-15 PROCEDURE — C9803 HOPD COVID-19 SPEC COLLECT: HCPCS | Performed by: EMERGENCY MEDICINE

## 2022-01-15 ASSESSMENT — ENCOUNTER SYMPTOMS
DYSPHORIC MOOD: 0
FATIGUE: 1
NECK PAIN: 0
SORE THROAT: 0
COUGH: 0
SLEEP DISTURBANCE: 0
VOMITING: 0
EYE REDNESS: 0
WEAKNESS: 0
HEADACHES: 0
SHORTNESS OF BREATH: 0
NAUSEA: 0
FEVER: 0
ABDOMINAL PAIN: 0
BACK PAIN: 0
DIFFICULTY URINATING: 0

## 2022-01-15 ASSESSMENT — MIFFLIN-ST. JEOR: SCORE: 1869.47

## 2022-01-15 NOTE — ED TRIAGE NOTES
Pt presents ambulatory with wife  Hx of BMT transplant in august. Pt went in for labs yesterday and found elevated LFTs and low transplant. Called nurse line and was told to go to ER.

## 2022-01-15 NOTE — DISCHARGE INSTRUCTIONS
Follow-up with the BMT Clinic on Monday as planned.    Check your [facility] account later today for covid test results.  If positive, you should quarantine as per attached instructions.    Return if any concerns.

## 2022-01-15 NOTE — ED PROVIDER NOTES
ED Provider Note  [facility]      History     Chief Complaint   Patient presents with     Abnormal Labs     HPI  Nik Nava is a 61 year old male with a history of stem cell transplant for  ALL who presents to the emergency department for elevated liver enzymes and low platelets.  He had labs obtained yesterday in the BMT clinic with ALT elevated to 72 and AST to 61.  His platelet count was also decreased to 116.  He contacted the BMT clinic today and was referred to the emergency department.  He denies any acetaminophen use.  His wife noted that his eyes were bloodshot yesterday.  He is on Xarelto for history of PE.  Patient denies any symptoms.  He denies any headache.  No fever or sore throat.  No chest pain or dyspnea.  Patient denies abdominal pain.  No nausea, vomiting, diarrhea, or constipation.  Denies any dysuria, urgency, frequency, or hematuria.  No leg pain or swelling.    Past Medical History  Past Medical History:   Diagnosis Date     Cancer (H)     renal cell     CKD (chronic kidney disease)      Thyroid disease      Past Surgical History:   Procedure Laterality Date     BACK SURGERY  2017     BONE MARROW BIOPSY, BONE SPECIMEN, NEEDLE/TROCAR Left 9/2/2021    Procedure: BIOPSY, BONE MARROW;  Surgeon: Jailyn Ny APRN CNP;  Location: [facility] OR     BONE MARROW BIOPSY, BONE SPECIMEN, NEEDLE/TROCAR Left 11/15/2021    Procedure: BIOPSY, BONE MARROW;  Surgeon: Socrates De La Torre;  Location: [facility] OR     IR CVC TUNNEL PLACEMENT > 5 YRS OF AGE  8/4/2021     IR CVC TUNNEL REMOVAL LEFT  9/2/2021     acyclovir (ZOVIRAX) 800 MG tablet  amLODIPine (NORVASC) 5 MG tablet  amoxicillin (AMOXIL) 500 MG tablet  clotrimazole (MYCELEX) 10 MG lozenge  levothyroxine (SYNTHROID/LEVOTHROID) 175 MCG tablet  LORazepam (ATIVAN) 1 MG tablet  magnesium oxide (MAG-OX) 400 MG tablet  melatonin 5 MG CAPS  nicotine polacrilex (NICORETTE) 4 MG gum  polyethylene glycol (MIRALAX) 17 GM/Dose  "powder  tacrolimus (GENERIC EQUIVALENT) 0.5 MG capsule  tacrolimus (GENERIC EQUIVALENT) 1 MG capsule  triamcinolone (KENALOG) 0.1 % external cream  XARELTO ANTICOAGULANT 20 MG TABS tablet      Allergies   Allergen Reactions     Sulfamethoxazole W/Trimethoprim Rash     Family History  Family History   Problem Relation Age of Onset     Lung Cancer Mother      Polycythemia Father      Coronary Artery Disease Maternal Grandmother      Social History   Social History     Tobacco Use     Smoking status: Former Smoker     Packs/day: 2.00     Years: 30.00     Pack years: 60.00     Quit date: 2019     Years since quittin.7     Smokeless tobacco: Never Used   Substance Use Topics     Alcohol use: Not Currently     Drug use: Never      Past medical history, past surgical history, medications, allergies, family history, and social history were reviewed with the patient. No additional pertinent items.       Review of Systems   Constitutional: Positive for fatigue. Negative for fever.   HENT: Negative for congestion and sore throat.    Eyes: Negative for redness.   Respiratory: Negative for cough and shortness of breath.    Cardiovascular: Negative for chest pain.   Gastrointestinal: Negative for abdominal pain, nausea and vomiting.   Genitourinary: Negative for difficulty urinating.   Musculoskeletal: Negative for back pain and neck pain.   Skin: Negative for rash.   Neurological: Negative for weakness and headaches.   Psychiatric/Behavioral: Negative for dysphoric mood, sleep disturbance and suicidal ideas.   All other systems reviewed and are negative.    A complete review of systems was performed with pertinent positives and negatives noted in the HPI, and all other systems negative.    Physical Exam   BP: 127/76  Pulse: 73  Temp: 98.1  F (36.7  C)  Resp: 18  Height: 185.4 cm (6' 1\")  Weight: 102.1 kg (225 lb)  SpO2: 97 %  Physical Exam  Vitals and nursing note reviewed.   Constitutional:       General: He is not in " acute distress.     Appearance: He is not diaphoretic.   HENT:      Head: Atraumatic.   Eyes:      General: No scleral icterus.     Conjunctiva/sclera: Conjunctivae normal.      Pupils: Pupils are equal, round, and reactive to light.   Cardiovascular:      Rate and Rhythm: Normal rate and regular rhythm.      Pulses: Normal pulses.      Heart sounds: Normal heart sounds.   Pulmonary:      Effort: No respiratory distress.      Breath sounds: Normal breath sounds.   Abdominal:      General: Bowel sounds are normal.      Palpations: Abdomen is soft.      Tenderness: There is no abdominal tenderness.   Musculoskeletal:         General: No tenderness. Normal range of motion.   Skin:     General: Skin is warm and dry.      Findings: No rash.   Neurological:      General: No focal deficit present.      Gait: Gait normal.         ED Course      Procedures       The medical record was reviewed and interpreted.  Current labs reviewed and interpreted.     Discussed with BMT fellow-concerned about COVID infection with fatigue and conjunctival injection.  Did not feel that patient's labs needed to be rechecked today.  He has follow-up in clinic on Monday.    Symptomatic swab obtained.  Results for orders placed or performed during the hospital encounter of 01/15/22   Symptomatic; Yes Influenza A/B & SARS-CoV2 (COVID-19) Virus PCR Multiplex Nasopharyngeal     Status: Normal    Specimen: Nasopharyngeal; Swab   Result Value Ref Range    Influenza A PCR Negative Negative    Influenza B PCR Negative Negative    RSV PCR Negative Negative    SARS CoV2 PCR Negative Negative, Testing sent to reference lab. Results will be returned via unsolicited result    Narrative    Testing was performed using the Xpert Xpress CoV2/Flu/RSV Assay on the [employer] GeneXpert Instrument. This test should be ordered for the detection of SARS-CoV-2 and influenza viruses in individuals who meet clinical and/or epidemiological criteria. Test performance is  unknown in asymptomatic patients. This test is for in vitro diagnostic use under the FDA EUA for laboratories certified under CLIA to perform high or moderate complexity testing. This test has not been FDA cleared or approved. A negative result does not rule out the presence of PCR inhibitors in the specimen or target RNA in concentration below the limit of detection for the assay. If only one viral target is positive but coinfection with multiple targets is suspected, the sample should be re-tested with another FDA cleared, approved, or authorized test, if coinfection would change clinical management. This test was validated by the [facility] [employer]. These laboratories are certified under the Clinical  Laboratory Improvement Amendments of 1988 (CLIA-88) as qualified to perform high complexity laboratory testing.         No results found for any visits on 01/15/22.  Medications - No data to display     Assessments & Plan (with Medical Decision Making)   61 year old male with history of stem cell transplant for ALL to the emergency department at the advice of the BMT service.  He has mild transaminitis and thrombocytopenia from labs obtained yesterday.  He was complaining of fatigue and his wife had noticed some conjunctival injection.  After discussion with the BMT service, they were concerned about COVID.  COVID and influenza testing was obtained and the patient is negative for both.  He appears clinically well here in the emergency department and has an otherwise unrevealing physical exam.  No repeat labs or other evaluation was felt necessary by the BMT service.  He will follow-up in the BMT clinic on Monday as planned.    I have reviewed the nursing notes. I have reviewed the findings, diagnosis, plan and need for follow up with the patient.    New Prescriptions    No medications on file       Final diagnoses:   Transaminitis   Thrombocytopenia (H)     Chart documentation was completed with [employer]  voice-recognition software. Even though reviewed, this chart may still contain some grammatical, spelling, and word errors.     --  Ricardo Montano Md  [facility] EMERGENCY DEPARTMENT  1/15/2022     Ricardo Montano MD  01/15/22 [id number]

## 2022-01-17 ENCOUNTER — TELEPHONE (OUTPATIENT)
Dept: TRANSPLANT | Facility: CLINIC | Age: 62
End: 2022-01-17
Payer: COMMERCIAL

## 2022-01-17 ENCOUNTER — VIRTUAL VISIT (OUTPATIENT)
Dept: TRANSPLANT | Facility: CLINIC | Age: 62
End: 2022-01-17
Attending: PHYSICIAN ASSISTANT
Payer: COMMERCIAL

## 2022-01-17 DIAGNOSIS — C91.01 ACUTE LYMPHOBLASTIC LEUKEMIA (ALL) IN REMISSION (H): ICD-10-CM

## 2022-01-17 DIAGNOSIS — Z94.81 STATUS POST BONE MARROW TRANSPLANT (H): ICD-10-CM

## 2022-01-17 DIAGNOSIS — Z94.81 STATUS POST BONE MARROW TRANSPLANT (H): Primary | ICD-10-CM

## 2022-01-17 PROCEDURE — 99214 OFFICE O/P EST MOD 30 MIN: CPT | Mod: 95

## 2022-01-17 RX ORDER — LORAZEPAM 1 MG/1
1 TABLET ORAL
Qty: 30 TABLET | Refills: 1 | Status: SHIPPED | OUTPATIENT
Start: 2022-01-17 | End: 2022-02-16

## 2022-01-17 RX ORDER — ACYCLOVIR 800 MG/1
800 TABLET ORAL 2 TIMES DAILY
Qty: 180 TABLET | Refills: 4 | Status: SHIPPED | OUTPATIENT
Start: 2022-01-17 | End: 2022-07-06

## 2022-01-17 RX ORDER — AMLODIPINE BESYLATE 5 MG/1
5 TABLET ORAL DAILY
Qty: 90 TABLET | Refills: 4 | Status: SHIPPED | OUTPATIENT
Start: 2022-01-17 | End: 2022-07-26

## 2022-01-17 RX ORDER — LEVOTHYROXINE SODIUM 175 UG/1
175 TABLET ORAL DAILY
Qty: 90 TABLET | Refills: 4 | Status: SHIPPED | OUTPATIENT
Start: 2022-01-17 | End: 2022-11-11

## 2022-01-17 NOTE — LETTER
Date:January 25, 2022      Provider requested that no letter be sent. Do not send.       [facility]

## 2022-01-17 NOTE — TELEPHONE ENCOUNTER
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    CSW received phone call from Pt's wife requesting BMT office contact information to send renewed FMLA pwk needed for Pt's BMT f/u appts/needs.     Intervention: CSW provided BMT contact information and informed her that Pt's BMT physician's RNCC will assist with this paperwork as needed. Pt's wife processed Pt's medical event that occurred over the wknd. She states Pt had abnormal labs and bloodshot eyes that she questioned if this was indicative of Pt tapering tac. She states they were informed by the BMT on-call fellow to come to [facility] ED for a COVID test only. She endorsed disappointment in this decision as they are charged an ED visit for a COVID test stating no labs or f/u were completed. CSW provided validation, empathetic listening, and support. CSW also provided Clinic Patient Relations contact information for follow-up PRN.     Follow-up: No SW f/u needs indicated at this time. Pt/wife encouraged to contact SW for psychosocial needs and resources as needed.    MATEO Garcia, [facility]  Adult Blood & Marrow Transplant   Phone: [phone]  Pager: [phone]

## 2022-01-17 NOTE — PROGRESS NOTES
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  He was recently advised to go to ED due to scleral injection and a note of drop in plt and mild transaminitis.  He was evaluated for covid (negative) and discharged back to our care.    Today, he feels good.  No recent fever, cough, SOB, malaise, myalgias, bleeding.  No further injected sclera.  No bleeding and remains on his blood thinner.  He thinks he is not drinking as much as he should.  No n/v/d.      Facial erythema ongoing.  Wife notes some on abdomen over weekend and steroid cream applied.  No new rash.  No desquamation or bullae.    Review of Systems                                                                                                                                         8 point Review of systems was o/w negative     PHYSICAL EXAM                                                                                                                                                 KPS: 80    Wt Readings from Last 5 Encounters:   01/15/22 102.1 kg (225 lb)   01/04/22 105 kg (231 lb 8 oz)   12/21/21 104.6 kg (230 lb 9.6 oz)   12/14/21 103.9 kg (229 lb)   12/07/21 105.1 kg (231 lb 12.8 oz)     General: NAD  HEENT: sclera anicteric with very scant injection.  No subconjunctival hemorrhage.   Lungs:  RRR       KPS=90  Current aGVHD staging:  Skin 0, UGI 0, LGI 0, Liver 0 (1/17/2022)    Labs:  Lab Results   Component Value Date    WBC 5.9 01/14/2022    ANEU 3.5 10/26/2021    HGB 12.3 (L) 01/14/2022    HCT 37.4 (L) 01/14/2022     (L) 01/14/2022     01/14/2022    POTASSIUM 4.4 01/14/2022    CHLORIDE 106 01/14/2022    CO2 26 01/14/2022    GLC 95 01/14/2022    BUN 31 (H) 01/14/2022    CR 1.29 (H) 01/14/2022    MAG 1.8 01/04/2022    INR 1.07 11/15/2021    BILITOTAL 0.6 01/14/2022    AST 61 (H) 01/14/2022    ALT 72 (H) 01/14/2022    ALKPHOS  91 01/14/2022    PROTTOTAL 7.4 01/14/2022    ALBUMIN 3.7 01/14/2022       I have assessed all abnormal lab values for their clinical significance and any values considered clinically significant have been addressed in the assessment and plan.        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 S/p allo sib PBSCT. (ABO matched)  HCT-CI score: 3 (prior solid tumor)  Day +100 bone marrow biopsy is 100% donor with no morphologic or flow cytometric evidence of leukemia BCR abl is pending.    - needs day +180 restaging scheduled--messaged BMT office    2.  HEME:   - Transfuse for hgb <7g/dL; plt < 10k.  No transfusions needs  -  pulmonary emboli: He developed a pulmonary emboli in the setting of receiving PEG asparaginase. On Xarelto because this was a provoked thrombus. Continue for anticoagulation for now.       3.  FEN/Renal: Oral intake good/stable  - Cr improved on lower tac.   Labs not checked today.  - Tac induced hypomag, decrease to 2tabs per day 12/14.   - He has a history of renal cancer and resection. has a single kidney.    4.    GVHD:   -  Given formal tac taper 12/21  - skin biopsied 8/30 (back)-subtle interface dermatitis, suggestive of gvhd? Back without any rash 12/21    5.  ID: Very likely lip thrush from topical steroid cream for weeks. Concern for topical steroids being wiped around eyes. Patient should STOP applying topical steroids to his face as rash is resolved and steroids are causing more of risk of thrush than concern for rash at this time. Complete clotrimazole   - prophy acv (letrmovir stopped at day +100), fluconazole, Pentamidine (12/7), (1/10 at [facility]).  - CMV neg 11/12- repeat pending.   - influenza vacc given 10/26.  Covid  vaccine given November 18- given second dose 12/14     6. Hx of graves disease; On synthroid 175 mcg daily     7. CV: Norvasc 5mg daily. BP stable today, likely will need to taper as on tac taper.    8. GI: omeprazole for heartburn        Plan: Continue tac taper    RTC: Pentamidine in next week (requested appt at [facility])  2 weeks BMT    30 minutes spent on the date of the encounter doing ED chart review, BMT chart review, interpretation of prior labs    RTC 1/27, f/u plt count, LFTs  - contacted BMT office regarding scheduling day +180 restaging     Lashanda Figueroa pa-c  [phone]

## 2022-01-17 NOTE — LETTER
1/17/2022         RE: Nik Nava  [address] [address] [id number]        Dear Colleague,    Thank you for referring your patient, Nik Nava, to the [facility] BLOOD AND MARROW TRANSPLANT PROGRAM [place]. Please see a copy of my visit note below.    Nik is a 61 year old who is being evaluated via a billable video visit.      How would you like to obtain your AVS? MyChart  If the video visit is dropped, the invitation should be resent by: Text to cell phone: [phone]  Will anyone else be joining your video visit? Lisa Joshi    Video Start Time: 10:12  Video-Visit Details    Type of service:  Video Visit    Video End Time:12:21    Originating Location (pt. 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  He was recently advised to go to ED due to scleral injection and a note of drop in plt and mild transaminitis.  He was evaluated for covid (negative) and discharged back to our care.    Today, he feels good.  No recent fever, cough, SOB, malaise, myalgias, bleeding.  No further injected sclera.  No bleeding and remains on his blood thinner.  He thinks he is not drinking as much as he should.  No n/v/d.      Facial erythema ongoing.  Wife notes some on abdomen over weekend and steroid cream applied.  No new rash.  No desquamation or bullae.    Review of Systems                                                                                                                                         8 point Review of systems was o/w negative     PHYSICAL EXAM                                                                                                                                                  KPS: 80    Wt Readings from Last 5 Encounters:   01/15/22 102.1 kg (225 lb)   01/04/22 105 kg (231 lb 8 oz)   12/21/21 104.6 kg (230 lb 9.6 oz)   12/14/21 103.9 kg (229 lb)   12/07/21 105.1 kg (231 lb 12.8 oz)     General: NAD  HEENT: sclera anicteric with very scant injection.  No subconjunctival hemorrhage.   Lungs:  RRR       KPS=90  Current aGVHD staging:  Skin 0, UGI 0, LGI 0, Liver 0 (1/17/2022)    Labs:  Lab Results   Component Value Date    WBC 5.9 01/14/2022    ANEU 3.5 10/26/2021    HGB 12.3 (L) 01/14/2022    HCT 37.4 (L) 01/14/2022     (L) 01/14/2022     01/14/2022    POTASSIUM 4.4 01/14/2022    CHLORIDE 106 01/14/2022    CO2 26 01/14/2022    GLC 95 01/14/2022    BUN 31 (H) 01/14/2022    CR 1.29 (H) 01/14/2022    MAG 1.8 01/04/2022    INR 1.07 11/15/2021    BILITOTAL 0.6 01/14/2022    AST 61 (H) 01/14/2022    ALT 72 (H) 01/14/2022    ALKPHOS 91 01/14/2022    PROTTOTAL 7.4 01/14/2022    ALBUMIN 3.7 01/14/2022       I have assessed all abnormal lab values for their clinical significance and any values considered clinically significant have been addressed in the assessment and plan.        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 S/p allo sib PBSCT. (ABO matched)  HCT-CI score: 3 (prior solid tumor)  Day +100 bone marrow biopsy is 100% donor with no morphologic or flow cytometric evidence of leukemia BCR abl is pending.    - needs day +180 restaging scheduled--messaged BMT office    2.  HEME:   - Transfuse for hgb <7g/dL; plt < 10k.  No transfusions needs  -  pulmonary emboli: He developed a pulmonary emboli in the setting of receiving PEG asparaginase. On Xarelto because this was a provoked thrombus. Continue for  anticoagulation for now.       3.  FEN/Renal: Oral intake good/stable  - Cr improved on lower tac.   Labs not checked today.  - Tac induced hypomag, decrease to 2tabs per day 12/14.   - He has a history of renal cancer and resection. has a single kidney.    4.    GVHD:   -  Given formal tac taper 12/21  - skin biopsied 8/30 (back)-subtle interface dermatitis, suggestive of gvhd? Back without any rash 12/21    5.  ID: Very likely lip thrush from topical steroid cream for weeks. Concern for topical steroids being wiped around eyes. Patient should STOP applying topical steroids to his face as rash is resolved and steroids are causing more of risk of thrush than concern for rash at this time. Complete clotrimazole   - prophy acv (letrmovir stopped at day +100), fluconazole, Pentamidine (12/7), (1/10 at [facility]).  - CMV neg 11/12- repeat pending.   - influenza vacc given 10/26.  Covid vaccine given November 18- given second dose 12/14     6. Hx of graves disease; On synthroid 175 mcg daily     7. CV: Norvasc 5mg daily. BP stable today, likely will need to taper as on tac taper.    8. GI: omeprazole for heartburn        Plan: Continue tac taper    RTC: Pentamidine in next week (requested appt at [facility])  2 weeks BMT    30 minutes spent on the date of the encounter doing ED chart review, BMT chart review, interpretation of prior labs    RTC 1/27, f/u plt count, LFTs  - contacted BMT office regarding scheduling day +180 restaging     Lashanda Figueroa pa-c  [phone]        Again, thank you for allowing me to participate in the care of your patient.        Sincerely,        BMT Advanced Practice Provider

## 2022-01-17 NOTE — PROGRESS NOTES
Nik is a 61 year old who is being evaluated via a billable video visit.      How would you like to obtain your AVS? MyChart  If the video visit is dropped, the invitation should be resent by: Text to cell phone: [phone]  Will anyone else be joining your video visit? Lisa    Carolina Joshi    Video Start Time: 10:12  Video-Visit Details    Type of service:  Video Visit    Video End Time:12:21    Originating Location (pt. Location): Home    Distant Location (provider location):  [facility] BLOOD AND MARROW TRANSPLANT PROGRAM [place]     Platform used for Video Visit: [employer]

## 2022-01-19 PROBLEM — C91.01 ACUTE LYMPHOBLASTIC LEUKEMIA (ALL) IN REMISSION (H): Status: ACTIVE | Noted: 2021-07-02

## 2022-01-20 ENCOUNTER — MYC MEDICAL ADVICE (OUTPATIENT)
Dept: TRANSPLANT | Facility: CLINIC | Age: 62
End: 2022-01-20
Payer: COMMERCIAL

## 2022-01-21 ENCOUNTER — TELEPHONE (OUTPATIENT)
Dept: TRANSPLANT | Facility: CLINIC | Age: 62
End: 2022-01-21
Payer: COMMERCIAL

## 2022-01-21 NOTE — TELEPHONE ENCOUNTER
Wife calls to report that Nik's eyes are still a bit red and that she noticed some redness/rashiness over his pubic area/lower abdomen, today.  She isn't sure if it is new, because she hadn't been checking that area before.  She did apply some steroid cream this morning.    They are currently at 0.5mg/1mg on tac taper.      Advised lubricating eye drops for known injected eyes (reported as mild by previous colleagues).  Advised bid-tid thin layer of steroid cream to rash areas.  Advised he halt his tac taper at current dose until we see him 1/27 to re-evaluate.    Teressa Rick PA-C  1/21/2022

## 2022-01-27 ENCOUNTER — ONCOLOGY VISIT (OUTPATIENT)
Dept: TRANSPLANT | Facility: CLINIC | Age: 62
End: 2022-01-27
Attending: PHYSICIAN ASSISTANT
Payer: COMMERCIAL

## 2022-01-27 ENCOUNTER — APPOINTMENT (OUTPATIENT)
Dept: LAB | Facility: CLINIC | Age: 62
End: 2022-01-27
Attending: INTERNAL MEDICINE
Payer: COMMERCIAL

## 2022-01-27 ENCOUNTER — CARE COORDINATION (OUTPATIENT)
Dept: TRANSPLANT | Facility: CLINIC | Age: 62
End: 2022-01-27

## 2022-01-27 VITALS
DIASTOLIC BLOOD PRESSURE: 81 MMHG | RESPIRATION RATE: 16 BRPM | HEART RATE: 79 BPM | TEMPERATURE: 97.2 F | WEIGHT: 228.9 LBS | BODY MASS INDEX: 30.2 KG/M2 | SYSTOLIC BLOOD PRESSURE: 132 MMHG | OXYGEN SATURATION: 98 %

## 2022-01-27 DIAGNOSIS — Z94.81 STATUS POST BONE MARROW TRANSPLANT (H): ICD-10-CM

## 2022-01-27 DIAGNOSIS — Z11.59 ENCOUNTER FOR SCREENING FOR OTHER VIRAL DISEASES: Primary | ICD-10-CM

## 2022-01-27 LAB
ALBUMIN SERPL-MCNC: 3.8 G/DL (ref 3.4–5)
ALP SERPL-CCNC: 100 U/L (ref 40–150)
ALT SERPL W P-5'-P-CCNC: 100 U/L (ref 0–70)
ANION GAP SERPL CALCULATED.3IONS-SCNC: 7 MMOL/L (ref 3–14)
AST SERPL W P-5'-P-CCNC: 98 U/L (ref 0–45)
BASOPHILS # BLD AUTO: 0.1 10E3/UL (ref 0–0.2)
BASOPHILS NFR BLD AUTO: 1 %
BILIRUB SERPL-MCNC: 0.6 MG/DL (ref 0.2–1.3)
BUN SERPL-MCNC: 26 MG/DL (ref 7–30)
CALCIUM SERPL-MCNC: 9.6 MG/DL (ref 8.5–10.1)
CHLORIDE BLD-SCNC: 106 MMOL/L (ref 94–109)
CO2 SERPL-SCNC: 24 MMOL/L (ref 20–32)
CREAT SERPL-MCNC: 1.22 MG/DL (ref 0.66–1.25)
EOSINOPHIL # BLD AUTO: 0.9 10E3/UL (ref 0–0.7)
EOSINOPHIL NFR BLD AUTO: 11 %
ERYTHROCYTE [DISTWIDTH] IN BLOOD BY AUTOMATED COUNT: 14.6 % (ref 10–15)
GFR SERPL CREATININE-BSD FRML MDRD: 67 ML/MIN/1.73M2
GLUCOSE BLD-MCNC: 103 MG/DL (ref 70–99)
HCT VFR BLD AUTO: 40.7 % (ref 40–53)
HGB BLD-MCNC: 13.6 G/DL (ref 13.3–17.7)
IMM GRANULOCYTES # BLD: 0 10E3/UL
IMM GRANULOCYTES NFR BLD: 0 %
LYMPHOCYTES # BLD AUTO: 3 10E3/UL (ref 0.8–5.3)
LYMPHOCYTES NFR BLD AUTO: 40 %
MCH RBC QN AUTO: 31.3 PG (ref 26.5–33)
MCHC RBC AUTO-ENTMCNC: 33.4 G/DL (ref 31.5–36.5)
MCV RBC AUTO: 94 FL (ref 78–100)
MONOCYTES # BLD AUTO: 1 10E3/UL (ref 0–1.3)
MONOCYTES NFR BLD AUTO: 13 %
NEUTROPHILS # BLD AUTO: 2.7 10E3/UL (ref 1.6–8.3)
NEUTROPHILS NFR BLD AUTO: 35 %
NRBC # BLD AUTO: 0 10E3/UL
NRBC BLD AUTO-RTO: 0 /100
PLATELET # BLD AUTO: 116 10E3/UL (ref 150–450)
POTASSIUM BLD-SCNC: 4.4 MMOL/L (ref 3.4–5.3)
PROT SERPL-MCNC: 7.4 G/DL (ref 6.8–8.8)
RBC # BLD AUTO: 4.34 10E6/UL (ref 4.4–5.9)
SODIUM SERPL-SCNC: 137 MMOL/L (ref 133–144)
WBC # BLD AUTO: 7.7 10E3/UL (ref 4–11)

## 2022-01-27 PROCEDURE — 85025 COMPLETE CBC W/AUTO DIFF WBC: CPT

## 2022-01-27 PROCEDURE — 82040 ASSAY OF SERUM ALBUMIN: CPT

## 2022-01-27 PROCEDURE — 250N000011 HC RX IP 250 OP 636: Performed by: PHYSICIAN ASSISTANT

## 2022-01-27 PROCEDURE — 36591 DRAW BLOOD OFF VENOUS DEVICE: CPT

## 2022-01-27 PROCEDURE — 80053 COMPREHEN METABOLIC PANEL: CPT

## 2022-01-27 PROCEDURE — 99214 OFFICE O/P EST MOD 30 MIN: CPT

## 2022-01-27 PROCEDURE — G0463 HOSPITAL OUTPT CLINIC VISIT: HCPCS

## 2022-01-27 RX ORDER — HEPARIN SODIUM (PORCINE) LOCK FLUSH IV SOLN 100 UNIT/ML 100 UNIT/ML
5 SOLUTION INTRAVENOUS ONCE
Status: COMPLETED | OUTPATIENT
Start: 2022-01-27 | End: 2022-01-27

## 2022-01-27 RX ORDER — CLOTRIMAZOLE 1 %
CREAM (GRAM) TOPICAL 2 TIMES DAILY
Qty: 28 G | Refills: 1 | Status: SHIPPED | OUTPATIENT
Start: 2022-01-27 | End: 2022-02-07

## 2022-01-27 RX ADMIN — Medication 5 ML: at 08:40

## 2022-01-27 ASSESSMENT — PAIN SCALES - GENERAL: PAINLEVEL: NO PAIN (0)

## 2022-01-27 NOTE — NURSING NOTE
"Oncology Rooming Note    January 27, 2022 8:52 AM   Nik Nava is a 61 year old male who presents for:    Chief Complaint   Patient presents with     Port Draw     Labs drawn via port by RN in lab. Vs taken.     Oncology Clinic Visit     ALL     Initial Vitals: /81   Pulse 79   Temp 97.2  F (36.2  C) (Tympanic)   Resp 16   Wt 103.8 kg (228 lb 14.4 oz)   SpO2 98%   BMI 30.20 kg/m   Estimated body mass index is 30.2 kg/m  as calculated from the following:    Height as of 1/15/22: 1.854 m (6' 1\").    Weight as of this encounter: 103.8 kg (228 lb 14.4 oz). Body surface area is 2.31 meters squared.  No Pain (0) Comment: Data Unavailable   No LMP for male patient.  Allergies reviewed: Yes  Medications reviewed: Yes    Medications: Medication refills not needed today.  Pharmacy name entered into L2:    [facility] - [place], [place] - [address] [facility] [place], [place] - [phone]    Clinical concerns:     Pt has had bloodshot eyes and runny eyes.    Pt rash in the chest and groin region have not improved after using steroid cream. The back and belly have improved.    Hung Baltazar MA              "

## 2022-01-27 NOTE — PROGRESS NOTES
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  Today, he feels good.  Wife is worried about his 'rash'.  He also has some new erythema of pubic area--wife is worried it is a yeast infection; pt has been applying steroid cream to this area.  Eyes are watering and they wonder if he has developed a new allergy.  No fever, cough or sick contacts.  Eating/drinking well with no n/v/d.      Review of Systems                                                                                                                                         8 point Review of systems was o/w negative     PHYSICAL EXAM                                                                                                                                                 KPS: 90  Blood pressure 132/81, pulse 79, temperature 97.2  F (36.2  C), temperature source Tympanic, resp. rate 16, weight 103.8 kg (228 lb 14.4 oz), SpO2 98 %.    Wt Readings from Last 4 Encounters:   01/27/22 103.8 kg (228 lb 14.4 oz)   01/15/22 102.1 kg (225 lb)   01/04/22 105 kg (231 lb 8 oz)   12/21/21 104.6 kg (230 lb 9.6 oz)     General: NAD  HEENT: sclera anicteric.  Notes tearing.  No obvious dry eye.  No subconjunctival hemorrhage.  Lacy buccal mucosa b/l with some erythema.  No lesions  Lungs:  CTA b/l  CV: RRR  Ext: faint macular erythema over abdomen.  Few areas of pustular erythema of chest.  Hyperpigmented area on back (prior area of GVHD).  Beefy red skin over pubic symphisis.  Skin:  Psych:  Good outlook       Current aGVHD staging:  Skin 0, UGI 0, LGI 0, Liver 0 (1/17/2022)    Labs:  Lab Results   Component Value Date    WBC 5.9 01/14/2022    ANEU 3.5 10/26/2021    HGB 12.3 (L) 01/14/2022    HCT 37.4 (L) 01/14/2022     (L) 01/14/2022     01/14/2022    POTASSIUM 4.4 01/14/2022    CHLORIDE 106 01/14/2022    CO2 26 01/14/2022    GLC 95 01/14/2022    BUN 31  (H) 01/14/2022    CR 1.29 (H) 01/14/2022    MAG 1.8 01/04/2022    INR 1.07 11/15/2021    BILITOTAL 0.6 01/14/2022    AST 61 (H) 01/14/2022    ALT 72 (H) 01/14/2022    ALKPHOS 91 01/14/2022    PROTTOTAL 7.4 01/14/2022    ALBUMIN 3.7 01/14/2022       I have assessed all abnormal lab values for their clinical significance and any values considered clinically significant have been addressed in the assessment and plan.        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 S/p allo sib PBSCT. (ABO matched)  HCT-CI score: 3 (prior solid tumor)  Day +100 bone marrow biopsy is 100% donor with no morphologic or flow cytometric evidence of leukemia BCR abl is pending.    - 2/7 day +180 restaging BM bx    2.  HEME:   - Transfuse for hgb <7g/dL; plt < 10k.  No transfusions needs  -  pulmonary emboli: He developed a pulmonary emboli in the setting of receiving PEG asparaginase. On Xarelto because this was a provoked thrombus. Continue for anticoagulation for now.  BMT office will call to remind him to hold prior to BM bx.  - plt stable, down overall; will follow, bm bx soon     3.  FEN/Renal: Oral intake good/stable  - Cr stable  - Tac induced hypomag, decrease to 2tabs per day 12/14.   - He has a history of renal cancer and resection. has a single kidney.    4.    GVHD:   -  Given formal tac taper 12/21--held for about 4 days (called in with ? Worsening rash); new calendar given today.  Dosing now is 0.5mg  BID.  - skin biopsied 8/30 (back)-subtle interface dermatitis, suggestive of gvhd? Back without any rash 12/21  - close f/u of mouth changes, eye lacrimation; early cGVHD?  Note of recent oral thrush.  No dex s/s today.      5.  ID: new likely candida over pubic  symphisis. Start clotrimazole   - prophy acv (letrmovir stopped at day +100), fluconazole, Pentamidine (1/10 at [facility]).  - CMV neg 1/4  - influenza vacc given 10/26.  Covid vaccine given November 18- given second dose 12/14     Pt given info on EVUSHELD today; declines as he and his wife want to learn more about it; will consider for next visit    6. Hx of graves disease; On synthroid 175 mcg daily     7. CV: Norvasc 5mg daily. BP stable today, likely will need to taper as on tac taper.    8. GI: omeprazole for heartburn      Plan:   - start clotrimazole cream to pubic area; avoid steroids there  - spare use of steroid cream to affected area  - close f/u of mouth changes, eye lacrimation  - Continue tac taper (new calendar given)  - 2/3:  covid test (pre-procedure)  - 2/7: BM bx; hold xarelto  - 2/10: review w/ Dr. Hill; f/u LFT.  If ALT/AST rising, could consider adding back ursodiol; IH pentamidine; possible evusheld     30 minutes spent on the date of the encounter doing BMT chart review, interpretation of prior labs    Lashanda Figueroa pa-c  [phone]

## 2022-01-27 NOTE — NURSING NOTE
Chief Complaint   Patient presents with     Port Draw     Labs drawn via port by RN in lab. Vs taken.     Port accessed with 20g gripper needle by RN, labs collected, line flushed with saline and heparin.  Vitals taken. Pt checked in for appointment(s).    Anila LARA RN PHN BSN  BMT/Oncology Lab

## 2022-01-27 NOTE — LETTER
1/27/2022         RE: Nik Nava  [address] [facility] [id number]        Dear Colleague,    Thank you for referring your patient, Nik Nava, to the [facility]ARR[facility]ANSPLANT [facility] [place]. Please see a copy of my visit note below.    BMT Clinic Note    ID:  Nik Nava is a 62 yo man D+170 s/p NMA allo sib PBSCT for Ph+ ALL    Interval History: Nik is seen with his wife.  Today, he feels good.  Wife is worried about his 'rash'.  He also has some new erythema of pubic area--wife is worried it is a yeast infection; pt has been applying steroid cream to this area.  Eyes are watering and they wonder if he has developed a new allergy.  No fever, cough or sick contacts.  Eating/drinking well with no n/v/d.      Review of Systems                                                                                                                                         8 point Review of systems was o/w negative     PHYSICAL EXAM                                                                                                                                                 KPS: 90  Blood pressure 132/81, pulse 79, temperature 97.2  F (36.2  C), temperature source Tympanic, resp. rate 16, weight 103.8 kg (228 lb 14.4 oz), SpO2 98 %.    Wt Readings from Last 4 Encounters:   01/27/22 103.8 kg (228 lb 14.4 oz)   01/15/22 102.1 kg (225 lb)   01/04/22 105 kg (231 lb 8 oz)   12/21/21 104.6 kg (230 lb 9.6 oz)     General: NAD  HEENT: sclera anicteric.  Notes tearing.  No obvious dry eye.  No subconjunctival hemorrhage.  Lacy buccal mucosa b/l with some erythema.  No lesions  Lungs:  CTA b/l  CV: RRR  Ext: faint macular erythema over abdomen.  Few areas of pustular erythema of chest.  Hyperpigmented area on back (prior area of GVHD).  Beefy red skin over pubic symphisis.  Skin:  Psych:  Good outlook       Current aGVHD staging:  Skin 0, UGI 0, LGI 0, Liver 0 (1/17/2022)    Labs:  Lab  Results   Component Value Date    WBC 5.9 01/14/2022    ANEU 3.5 10/26/2021    HGB 12.3 (L) 01/14/2022    HCT 37.4 (L) 01/14/2022     (L) 01/14/2022     01/14/2022    POTASSIUM 4.4 01/14/2022    CHLORIDE 106 01/14/2022    CO2 26 01/14/2022    GLC 95 01/14/2022    BUN 31 (H) 01/14/2022    CR 1.29 (H) 01/14/2022    MAG 1.8 01/04/2022    INR 1.07 11/15/2021    BILITOTAL 0.6 01/14/2022    AST 61 (H) 01/14/2022    ALT 72 (H) 01/14/2022    ALKPHOS 91 01/14/2022    PROTTOTAL 7.4 01/14/2022    ALBUMIN 3.7 01/14/2022       I have assessed all abnormal lab values for their clinical significance and any values considered clinically significant have been addressed in the assessment and plan.        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 S/p allo sib PBSCT. (ABO matched)  HCT-CI score: 3 (prior solid tumor)  Day +100 bone marrow biopsy is 100% donor with no morphologic or flow cytometric evidence of leukemia BCR abl is pending.    - 2/7 day +180 restaging BM bx    2.  HEME:   - Transfuse for hgb <7g/dL; plt < 10k.  No transfusions needs  -  pulmonary emboli: He developed a pulmonary emboli in the setting of receiving PEG asparaginase. On Xarelto because this was a provoked thrombus. Continue for anticoagulation for now.  BMT office will call to remind him to hold prior to BM bx.  - plt stable, down overall; will follow, bm bx soon     3.  FEN/Renal: Oral intake good/stable  - Cr stable  - Tac induced hypomag, decrease to 2tabs per day 12/14.   - He has a history of renal cancer and resection. has a single kidney.    4.    GVHD:   -  Given formal tac taper 12/21--held for about 4 days (called in with ? Worsening rash); new calendar given today.   Dosing now is 0.5mg  BID.  - skin biopsied 8/30 (back)-subtle interface dermatitis, suggestive of gvhd? Back without any rash 12/21  - close f/u of mouth changes, eye lacrimation; early cGVHD?  Note of recent oral thrush.  No dex s/s today.      5.  ID: new likely candida over pubic symphisis. Start clotrimazole   - prophy acv (letrmovir stopped at day +100), fluconazole, Pentamidine (1/10 at [facility]).  - CMV neg 1/4  - influenza vacc given 10/26.  Covid vaccine given November 18- given second dose 12/14     Pt given info on EVUSHELD today; declines as he and his wife want to learn more about it; will consider for next visit    6. Hx of graves disease; On synthroid 175 mcg daily     7. CV: Norvasc 5mg daily. BP stable today, likely will need to taper as on tac taper.    8. GI: omeprazole for heartburn      Plan:   - start clotrimazole cream to pubic area; avoid steroids there  - spare use of steroid cream to affected area  - close f/u of mouth changes, eye lacrimation  - Continue tac taper (new calendar given)  - 2/3:  covid test (pre-procedure)  - 2/7: BM bx; hold xarelto  - 2/10: review w/ Dr. Hill; f/u LFT.  If ALT/AST rising, could consider adding back ursodiol; IH pentamidine; possible evusheld     30 minutes spent on the date of the encounter doing BMT chart review, interpretation of prior labs    Lashanda Figueroa pa-c  [phone]            Again, thank you for allowing me to participate in the care of your patient.      Sincerely,    BMT Advanced Practice Provider

## 2022-01-27 NOTE — PROGRESS NOTES
Pt called and reminded to hold his xaralto prior to his upcoming bone marrow bx. .He was already aware and will hold this.  No other concerns at this time.

## 2022-01-27 NOTE — PHARMACY-TAPERING SERVICE
Tacrolimus updated taper     30-Jan-2022 31-Jan-2022 1-Feb-2022 2-Feb-2022 3-Feb-2022 4-Feb-2022 5-Feb-2022   0.5 mg AM             0.5 mg PM 0.5 mg AM             0.5 mg PM 0.5 mg AM             0.5 mg PM 0.5 mg AM             0.5 mg PM 0.5 mg AM             0.5 mg PM 0.5 mg AM             0.5 mg PM 0.5 mg AM             0.5 mg PM   6-Feb-2022 7-Feb-2022 8-Feb-2022 9-Feb-2022 10-Feb-2022 11-Feb-2022 12-Feb-2022   0.5 mg AM             0.5 mg PM 0.5 mg AM             0.5 mg PM 0.5 mg AM              0.5 mg AM             0.5 mg PM 0.5 mg AM              0.5 mg AM             0.5 mg PM 0.5 mg AM             0.5 mg PM   13-Feb-2022 14-Feb-2022 15-Feb-2022 16-Feb-2022 17-Feb-2022 18-Feb-2022 19-Feb-2022   0.5 mg AM             0.5 mg PM 0.5 mg AM              0.5 mg AM              0.5 mg AM              0.5 mg AM              0.5 mg AM              0.5 mg AM                20-Feb-2022 21-Feb-2022 22-Feb-2022 23-Feb-2022 24-Feb-2022 25-Feb-2022 26-Feb-2022   0.5 mg AM              0.5 mg AM              0 0.5 mg AM              0 0.5 mg AM              0.5 mg AM                27-Feb-2022 28-Feb-2022 1-Mar-2022 2-Mar-2022 3-Mar-2022 4-Mar-2022 5-Mar-2022   0.5 mg AM              STOP

## 2022-02-01 ENCOUNTER — TELEPHONE (OUTPATIENT)
Dept: TRANSPLANT | Facility: CLINIC | Age: 62
End: 2022-02-01
Payer: COMMERCIAL

## 2022-02-01 DIAGNOSIS — Z94.81 STATUS POST BONE MARROW TRANSPLANT (H): Primary | ICD-10-CM

## 2022-02-01 NOTE — PROGRESS NOTES
"Patient called triage line due to red, watery eyes.  He has no vision changes, other than some blurring from excessive tearing.  He has no eye pain nor physical irritation.  He has no concerning vision changes.  His wife notes some \"crusting\" in the morning, but denies any purulent discharge.    Non-urgent referral placed to ophthalmology for assessment.  Patient aware to call to make appointment with them.    Teressa Rick PA-C  2/1/2022    "

## 2022-02-03 ENCOUNTER — LAB (OUTPATIENT)
Dept: LAB | Facility: CLINIC | Age: 62
End: 2022-02-03
Payer: COMMERCIAL

## 2022-02-03 DIAGNOSIS — Z11.59 ENCOUNTER FOR SCREENING FOR OTHER VIRAL DISEASES: ICD-10-CM

## 2022-02-03 LAB — SARS-COV-2 RNA RESP QL NAA+PROBE: NEGATIVE

## 2022-02-03 PROCEDURE — U0005 INFEC AGEN DETEC AMPLI PROBE: HCPCS

## 2022-02-03 PROCEDURE — U0003 INFECTIOUS AGENT DETECTION BY NUCLEIC ACID (DNA OR RNA); SEVERE ACUTE RESPIRATORY SYNDROME CORONAVIRUS 2 (SARS-COV-2) (CORONAVIRUS DISEASE [COVID-19]), AMPLIFIED PROBE TECHNIQUE, MAKING USE OF HIGH THROUGHPUT TECHNOLOGIES AS DESCRIBED BY CMS-2020-01-R: HCPCS

## 2022-02-04 ENCOUNTER — ANESTHESIA EVENT (OUTPATIENT)
Dept: SURGERY | Facility: AMBULATORY SURGERY CENTER | Age: 62
End: 2022-02-04
Payer: COMMERCIAL

## 2022-02-04 RX ORDER — LIDOCAINE 40 MG/G
CREAM TOPICAL
Status: CANCELLED | OUTPATIENT
Start: 2022-02-04

## 2022-02-04 RX ORDER — LIDOCAINE HYDROCHLORIDE 10 MG/ML
8-10 INJECTION, SOLUTION EPIDURAL; INFILTRATION; INTRACAUDAL; PERINEURAL
Status: CANCELLED | OUTPATIENT
Start: 2022-02-04

## 2022-02-07 ENCOUNTER — HOSPITAL ENCOUNTER (OUTPATIENT)
Facility: AMBULATORY SURGERY CENTER | Age: 62
End: 2022-02-07
Attending: PHYSICIAN ASSISTANT
Payer: COMMERCIAL

## 2022-02-07 ENCOUNTER — ONCOLOGY VISIT (OUTPATIENT)
Dept: TRANSPLANT | Facility: CLINIC | Age: 62
End: 2022-02-07
Attending: INTERNAL MEDICINE
Payer: COMMERCIAL

## 2022-02-07 ENCOUNTER — TELEPHONE (OUTPATIENT)
Dept: OPHTHALMOLOGY | Facility: CLINIC | Age: 62
End: 2022-02-07

## 2022-02-07 ENCOUNTER — APPOINTMENT (OUTPATIENT)
Dept: LAB | Facility: CLINIC | Age: 62
End: 2022-02-07
Attending: PHYSICIAN ASSISTANT
Payer: COMMERCIAL

## 2022-02-07 ENCOUNTER — ANESTHESIA (OUTPATIENT)
Dept: SURGERY | Facility: AMBULATORY SURGERY CENTER | Age: 62
End: 2022-02-07
Payer: COMMERCIAL

## 2022-02-07 ENCOUNTER — OFFICE VISIT (OUTPATIENT)
Dept: TRANSPLANT | Facility: CLINIC | Age: 62
End: 2022-02-07
Attending: PHYSICIAN ASSISTANT
Payer: COMMERCIAL

## 2022-02-07 VITALS
TEMPERATURE: 97.9 F | RESPIRATION RATE: 16 BRPM | DIASTOLIC BLOOD PRESSURE: 73 MMHG | OXYGEN SATURATION: 97 % | BODY MASS INDEX: 30.58 KG/M2 | WEIGHT: 231.8 LBS | HEART RATE: 82 BPM | SYSTOLIC BLOOD PRESSURE: 124 MMHG

## 2022-02-07 VITALS
DIASTOLIC BLOOD PRESSURE: 74 MMHG | SYSTOLIC BLOOD PRESSURE: 101 MMHG | TEMPERATURE: 97.7 F | WEIGHT: 225 LBS | BODY MASS INDEX: 29.82 KG/M2 | OXYGEN SATURATION: 97 % | RESPIRATION RATE: 16 BRPM | HEART RATE: 80 BPM | HEIGHT: 73 IN

## 2022-02-07 DIAGNOSIS — C91.01 ACUTE LYMPHOBLASTIC LEUKEMIA (ALL) IN REMISSION (H): ICD-10-CM

## 2022-02-07 DIAGNOSIS — Z94.81 STATUS POST BONE MARROW TRANSPLANT (H): ICD-10-CM

## 2022-02-07 DIAGNOSIS — D89.813 GVHD AS COMPLICATION OF BONE MARROW TRANSPLANT (H): ICD-10-CM

## 2022-02-07 DIAGNOSIS — C91.01 ACUTE LYMPHOBLASTIC LEUKEMIA (ALL) IN REMISSION (H): Primary | ICD-10-CM

## 2022-02-07 DIAGNOSIS — T86.09 GVHD AS COMPLICATION OF BONE MARROW TRANSPLANT (H): ICD-10-CM

## 2022-02-07 LAB
ALBUMIN SERPL-MCNC: 3.3 G/DL (ref 3.4–5)
ALP SERPL-CCNC: 119 U/L (ref 40–150)
ALT SERPL W P-5'-P-CCNC: 146 U/L (ref 0–70)
ANION GAP SERPL CALCULATED.3IONS-SCNC: 7 MMOL/L (ref 3–14)
AST SERPL W P-5'-P-CCNC: 156 U/L (ref 0–45)
BASOPHILS # BLD AUTO: 0.1 10E3/UL (ref 0–0.2)
BASOPHILS NFR BLD AUTO: 1 %
BILIRUB SERPL-MCNC: 0.6 MG/DL (ref 0.2–1.3)
BUN SERPL-MCNC: 26 MG/DL (ref 7–30)
CALCIUM SERPL-MCNC: 9.3 MG/DL (ref 8.5–10.1)
CHLORIDE BLD-SCNC: 105 MMOL/L (ref 94–109)
CMV DNA SPEC NAA+PROBE-ACNC: NOT DETECTED IU/ML
CO2 SERPL-SCNC: 23 MMOL/L (ref 20–32)
CREAT SERPL-MCNC: 1.32 MG/DL (ref 0.66–1.25)
EOSINOPHIL # BLD AUTO: 1 10E3/UL (ref 0–0.7)
EOSINOPHIL NFR BLD AUTO: 13 %
ERYTHROCYTE [DISTWIDTH] IN BLOOD BY AUTOMATED COUNT: 14.9 % (ref 10–15)
GFR SERPL CREATININE-BSD FRML MDRD: 61 ML/MIN/1.73M2
GLUCOSE BLD-MCNC: 101 MG/DL (ref 70–99)
HCT VFR BLD AUTO: 40.8 % (ref 40–53)
HGB BLD-MCNC: 13.4 G/DL (ref 13.3–17.7)
IMM GRANULOCYTES # BLD: 0 10E3/UL
IMM GRANULOCYTES NFR BLD: 1 %
INR PPP: 0.96 (ref 0.85–1.15)
LAB DIRECTOR DISCLAIMER: NORMAL
LAB DIRECTOR INTERPRETATION: NORMAL
LAB DIRECTOR METHODOLOGY: NORMAL
LAB DIRECTOR RESULTS: NORMAL
LYMPHOCYTES # BLD AUTO: 2.9 10E3/UL (ref 0.8–5.3)
LYMPHOCYTES NFR BLD AUTO: 36 %
MCH RBC QN AUTO: 30.6 PG (ref 26.5–33)
MCHC RBC AUTO-ENTMCNC: 32.8 G/DL (ref 31.5–36.5)
MCV RBC AUTO: 93 FL (ref 78–100)
MONOCYTES # BLD AUTO: 1.1 10E3/UL (ref 0–1.3)
MONOCYTES NFR BLD AUTO: 14 %
NEUTROPHILS # BLD AUTO: 2.7 10E3/UL (ref 1.6–8.3)
NEUTROPHILS NFR BLD AUTO: 35 %
NRBC # BLD AUTO: 0 10E3/UL
NRBC BLD AUTO-RTO: 0 /100
PLATELET # BLD AUTO: 92 10E3/UL (ref 150–450)
POTASSIUM BLD-SCNC: 4.5 MMOL/L (ref 3.4–5.3)
PROT SERPL-MCNC: 6.9 G/DL (ref 6.8–8.8)
RBC # BLD AUTO: 4.38 10E6/UL (ref 4.4–5.9)
SODIUM SERPL-SCNC: 135 MMOL/L (ref 133–144)
SPECIMEN DESCRIPTION: NORMAL
WBC # BLD AUTO: 7.8 10E3/UL (ref 4–11)

## 2022-02-07 PROCEDURE — 38222 DX BONE MARROW BX & ASPIR: CPT

## 2022-02-07 PROCEDURE — 81267 CHIMERISM ANAL NO CELL SELEC: CPT | Performed by: PHYSICIAN ASSISTANT

## 2022-02-07 PROCEDURE — 88311 DECALCIFY TISSUE: CPT | Mod: 26 | Performed by: PATHOLOGY

## 2022-02-07 PROCEDURE — G0452 MOLECULAR PATHOLOGY INTERPR: HCPCS | Mod: 26 | Performed by: PATHOLOGY

## 2022-02-07 PROCEDURE — 88237 TISSUE CULTURE BONE MARROW: CPT | Performed by: PHYSICIAN ASSISTANT

## 2022-02-07 PROCEDURE — 88185 FLOWCYTOMETRY/TC ADD-ON: CPT | Performed by: PHYSICIAN ASSISTANT

## 2022-02-07 PROCEDURE — 85025 COMPLETE CBC W/AUTO DIFF WBC: CPT | Performed by: PHYSICIAN ASSISTANT

## 2022-02-07 PROCEDURE — 80053 COMPREHEN METABOLIC PANEL: CPT | Performed by: PHYSICIAN ASSISTANT

## 2022-02-07 PROCEDURE — 88305 TISSUE EXAM BY PATHOLOGIST: CPT | Mod: TC | Performed by: PHYSICIAN ASSISTANT

## 2022-02-07 PROCEDURE — 88188 FLOWCYTOMETRY/READ 9-15: CPT | Mod: GC | Performed by: PATHOLOGY

## 2022-02-07 PROCEDURE — 88305 TISSUE EXAM BY PATHOLOGIST: CPT | Mod: 26 | Performed by: PATHOLOGY

## 2022-02-07 PROCEDURE — 85097 BONE MARROW INTERPRETATION: CPT | Performed by: PATHOLOGY

## 2022-02-07 PROCEDURE — 88184 FLOWCYTOMETRY/ TC 1 MARKER: CPT | Performed by: PHYSICIAN ASSISTANT

## 2022-02-07 PROCEDURE — 99207 PR SATISFY VISIT NUMBER: CPT

## 2022-02-07 PROCEDURE — 85060 BLOOD SMEAR INTERPRETATION: CPT | Performed by: PATHOLOGY

## 2022-02-07 PROCEDURE — 250N000011 HC RX IP 250 OP 636: Performed by: INTERNAL MEDICINE

## 2022-02-07 PROCEDURE — G0463 HOSPITAL OUTPT CLINIC VISIT: HCPCS

## 2022-02-07 PROCEDURE — 36591 DRAW BLOOD OFF VENOUS DEVICE: CPT

## 2022-02-07 PROCEDURE — 88264 CHROMOSOME ANALYSIS 20-25: CPT | Performed by: PHYSICIAN ASSISTANT

## 2022-02-07 PROCEDURE — 38222 DX BONE MARROW BX & ASPIR: CPT | Mod: LT

## 2022-02-07 PROCEDURE — 88184 FLOWCYTOMETRY/ TC 1 MARKER: CPT | Performed by: PATHOLOGY

## 2022-02-07 PROCEDURE — 88271 CYTOGENETICS DNA PROBE: CPT | Performed by: PHYSICIAN ASSISTANT

## 2022-02-07 PROCEDURE — 85610 PROTHROMBIN TIME: CPT

## 2022-02-07 PROCEDURE — 88368 INSITU HYBRIDIZATION MANUAL: CPT | Mod: 26 | Performed by: MEDICAL GENETICS

## 2022-02-07 RX ORDER — EPINEPHRINE 1 MG/ML
0.3 INJECTION, SOLUTION INTRAMUSCULAR; SUBCUTANEOUS EVERY 5 MIN PRN
Status: CANCELLED | OUTPATIENT
Start: 2022-02-10

## 2022-02-07 RX ORDER — HEPARIN SODIUM (PORCINE) LOCK FLUSH IV SOLN 100 UNIT/ML 100 UNIT/ML
500 SOLUTION INTRAVENOUS ONCE
Status: COMPLETED | OUTPATIENT
Start: 2022-02-07 | End: 2022-02-07

## 2022-02-07 RX ORDER — ALBUTEROL SULFATE 90 UG/1
1-2 AEROSOL, METERED RESPIRATORY (INHALATION)
Status: CANCELLED
Start: 2022-02-10

## 2022-02-07 RX ORDER — LIDOCAINE HYDROCHLORIDE 10 MG/ML
8-10 INJECTION, SOLUTION EPIDURAL; INFILTRATION; INTRACAUDAL; PERINEURAL
Status: DISCONTINUED | OUTPATIENT
Start: 2022-02-07 | End: 2022-02-08 | Stop reason: HOSPADM

## 2022-02-07 RX ORDER — OXYCODONE HYDROCHLORIDE 5 MG/1
5 TABLET ORAL EVERY 4 HOURS PRN
Status: DISCONTINUED | OUTPATIENT
Start: 2022-02-07 | End: 2022-02-08 | Stop reason: HOSPADM

## 2022-02-07 RX ORDER — LIDOCAINE 40 MG/G
CREAM TOPICAL
Status: DISCONTINUED | OUTPATIENT
Start: 2022-02-07 | End: 2022-02-08 | Stop reason: HOSPADM

## 2022-02-07 RX ORDER — ONDANSETRON 2 MG/ML
4 INJECTION INTRAMUSCULAR; INTRAVENOUS EVERY 30 MIN PRN
Status: DISCONTINUED | OUTPATIENT
Start: 2022-02-07 | End: 2022-02-08 | Stop reason: HOSPADM

## 2022-02-07 RX ORDER — METHYLPREDNISOLONE SODIUM SUCCINATE 125 MG/2ML
125 INJECTION, POWDER, LYOPHILIZED, FOR SOLUTION INTRAMUSCULAR; INTRAVENOUS
Status: CANCELLED
Start: 2022-02-10

## 2022-02-07 RX ORDER — LIDOCAINE HYDROCHLORIDE 20 MG/ML
INJECTION, SOLUTION INFILTRATION; PERINEURAL PRN
Status: DISCONTINUED | OUTPATIENT
Start: 2022-02-07 | End: 2022-02-07

## 2022-02-07 RX ORDER — DIPHENHYDRAMINE HYDROCHLORIDE 50 MG/ML
50 INJECTION INTRAMUSCULAR; INTRAVENOUS
Status: CANCELLED
Start: 2022-02-10

## 2022-02-07 RX ORDER — ONDANSETRON 4 MG/1
4 TABLET, ORALLY DISINTEGRATING ORAL EVERY 30 MIN PRN
Status: DISCONTINUED | OUTPATIENT
Start: 2022-02-07 | End: 2022-02-08 | Stop reason: HOSPADM

## 2022-02-07 RX ORDER — HEPARIN SODIUM (PORCINE) LOCK FLUSH IV SOLN 100 UNIT/ML 100 UNIT/ML
5 SOLUTION INTRAVENOUS ONCE
Status: COMPLETED | OUTPATIENT
Start: 2022-02-07 | End: 2022-02-07

## 2022-02-07 RX ORDER — TACROLIMUS 1 MG/G
OINTMENT TOPICAL 2 TIMES DAILY
Qty: 30 G | Refills: 0 | Status: SHIPPED | OUTPATIENT
Start: 2022-02-07 | End: 2022-10-11

## 2022-02-07 RX ORDER — TACROLIMUS 0.5 MG/1
CAPSULE ORAL
Qty: 60 CAPSULE | Refills: 1 | Status: SHIPPED | OUTPATIENT
Start: 2022-02-07 | End: 2022-02-24

## 2022-02-07 RX ORDER — NALOXONE HYDROCHLORIDE 0.4 MG/ML
0.2 INJECTION, SOLUTION INTRAMUSCULAR; INTRAVENOUS; SUBCUTANEOUS
Status: CANCELLED | OUTPATIENT
Start: 2022-02-10

## 2022-02-07 RX ORDER — SODIUM CHLORIDE, SODIUM LACTATE, POTASSIUM CHLORIDE, CALCIUM CHLORIDE 600; 310; 30; 20 MG/100ML; MG/100ML; MG/100ML; MG/100ML
INJECTION, SOLUTION INTRAVENOUS CONTINUOUS
Status: DISCONTINUED | OUTPATIENT
Start: 2022-02-07 | End: 2022-02-08 | Stop reason: HOSPADM

## 2022-02-07 RX ORDER — ALBUTEROL SULFATE 0.83 MG/ML
2.5 SOLUTION RESPIRATORY (INHALATION)
Status: CANCELLED | OUTPATIENT
Start: 2022-02-10

## 2022-02-07 RX ORDER — PROPOFOL 10 MG/ML
INJECTION, EMULSION INTRAVENOUS CONTINUOUS PRN
Status: DISCONTINUED | OUTPATIENT
Start: 2022-02-07 | End: 2022-02-07

## 2022-02-07 RX ORDER — HYDROMORPHONE HYDROCHLORIDE 1 MG/ML
0.2 INJECTION, SOLUTION INTRAMUSCULAR; INTRAVENOUS; SUBCUTANEOUS EVERY 5 MIN PRN
Status: DISCONTINUED | OUTPATIENT
Start: 2022-02-07 | End: 2022-02-08 | Stop reason: HOSPADM

## 2022-02-07 RX ORDER — FENTANYL CITRATE 50 UG/ML
25 INJECTION, SOLUTION INTRAMUSCULAR; INTRAVENOUS EVERY 5 MIN PRN
Status: DISCONTINUED | OUTPATIENT
Start: 2022-02-07 | End: 2022-02-08 | Stop reason: HOSPADM

## 2022-02-07 RX ORDER — DEXAMETHASONE 0.5 MG/5ML
2 SOLUTION ORAL 4 TIMES DAILY
Qty: 500 ML | Refills: 3 | Status: SHIPPED | OUTPATIENT
Start: 2022-02-07 | End: 2022-02-16

## 2022-02-07 RX ORDER — MEPERIDINE HYDROCHLORIDE 25 MG/ML
25 INJECTION INTRAMUSCULAR; INTRAVENOUS; SUBCUTANEOUS EVERY 30 MIN PRN
Status: CANCELLED | OUTPATIENT
Start: 2022-02-10

## 2022-02-07 RX ORDER — ACETAMINOPHEN 325 MG/1
975 TABLET ORAL ONCE
Status: COMPLETED | OUTPATIENT
Start: 2022-02-07 | End: 2022-02-07

## 2022-02-07 RX ORDER — FENTANYL CITRATE 50 UG/ML
25 INJECTION, SOLUTION INTRAMUSCULAR; INTRAVENOUS
Status: DISCONTINUED | OUTPATIENT
Start: 2022-02-07 | End: 2022-02-08 | Stop reason: HOSPADM

## 2022-02-07 RX ADMIN — LIDOCAINE HYDROCHLORIDE 40 MG: 20 INJECTION, SOLUTION INFILTRATION; PERINEURAL at 12:05

## 2022-02-07 RX ADMIN — PROPOFOL 200 MCG/KG/MIN: 10 INJECTION, EMULSION INTRAVENOUS at 12:05

## 2022-02-07 RX ADMIN — Medication 500 UNITS: at 09:39

## 2022-02-07 RX ADMIN — ACETAMINOPHEN 975 MG: 325 TABLET ORAL at 11:57

## 2022-02-07 RX ADMIN — HEPARIN SODIUM (PORCINE) LOCK FLUSH IV SOLN 100 UNIT/ML 5 ML: 100 SOLUTION at 13:03

## 2022-02-07 RX ADMIN — SODIUM CHLORIDE, SODIUM LACTATE, POTASSIUM CHLORIDE, CALCIUM CHLORIDE: 600; 310; 30; 20 INJECTION, SOLUTION INTRAVENOUS at 12:01

## 2022-02-07 ASSESSMENT — MIFFLIN-ST. JEOR: SCORE: 1869.47

## 2022-02-07 ASSESSMENT — PAIN SCALES - GENERAL: PAINLEVEL: NO PAIN (0)

## 2022-02-07 ASSESSMENT — LIFESTYLE VARIABLES: TOBACCO_USE: 1

## 2022-02-07 NOTE — PROGRESS NOTES
Patient Name: Nik Nava  Patient MRN: [id number]  Patient : 1960    Abbreviated H&P and Pre-sedation Assessment for  with sedation    Chief complaint and/or reason for Procedure: bone marrow biopsy    Planned level of sedation: Deep sedation    History of problems with sedation: (patient or family hx): No    ASA Assessment Category: 3 - Severe systemic disease, but not incapacitating    History of sleep apnea: Yes; no cpap.     History of blood thinners: Yes; xeralto on hold since 2/5pm.      Appropriate NPO status: No    Current tobacco use: No    Any recent fever, cough, chest or sinus congestion, SOB, weight loss, chest pain, diarrhea or constipation. No    Medications   Currently Scheduled Medications       Home Med List)  (Not in a hospital admission)      Allergies  Sulfamethoxazole w/trimethoprim    PMH:  Past Medical History:   Diagnosis Date     Cancer (H)     renal cell     CKD (chronic kidney disease)      Thyroid disease        Past Surgical History:   Procedure Laterality Date     BACK SURGERY       BONE MARROW BIOPSY, BONE SPECIMEN, NEEDLE/TROCAR Left 2021    Procedure: BIOPSY, BONE MARROW;  Surgeon: Jailyn Ny APRN CNP;  Location: [facility] OR     BONE MARROW BIOPSY, BONE SPECIMEN, NEEDLE/TROCAR Left 11/15/2021    Procedure: BIOPSY, BONE MARROW;  Surgeon: Socrates De La Torre;  Location: [facility] OR     IR CVC TUNNEL PLACEMENT > 5 YRS OF AGE  2021     IR CVC TUNNEL REMOVAL LEFT  2021       Focused Physical exam pertinent to procedure:          (Details of heart, lung, ASA assessment and mallampati assessment in pre procedure assessment flowsheet)  General- healthy,alert,no distress   Recent vital signs-  /73 (BP Location: Right arm)   Pulse 82   Temp 97.9  F (36.6  C) (Oral)   Resp 16   Wt 105.1 kg (231 lb 12.8 oz)   SpO2 97%   BMI 30.58 kg/m    HEART-regular rate and rhythm and no murmurs, gallops, or rub  LUNGS-Clear to Ausculation  OROPHARYNGEAL -  MALLAMPATTI- Class III (visualization of the soft palate and base of uvula)    A/P:Reviewed history, medications, allergies, clinical information and pre procedure assessment. The patient was informed of the risks and benefits of the procedure.  They would like to proceed.  Nik Nava is approved for use of sedation during their procedure as noted above.      MD signature  Alba Wisdom, PAShawnC

## 2022-02-07 NOTE — PROGRESS NOTES
BMT Clinic Note    ID:  Nik Nava is a 62 yo man D+181 s/p NMA allo sib PBSCT for Ph+ ALL  CC: essentially add on visit as only here for H&P but GVHD concerns    HPI: NOtes that rash had been stable since seeing Lashanda Figueroa, however worsening/not improved eye tearin. Today he notes new rash on lateral torso (had just been healing/brown rash) also new rash on bilateral arms. He also has sensitivity to hot/cold in mouth, no alice mouth sores, lips are chapped and tight which is unchanged last few weeks. Nik and his wife have many questions about GVHD. No n/v/d.     Review of Systems                                                                                                                                         8 point Review of systems was o/w negative     PHYSICAL EXAM                                                                                                                                                 KPS: 90  There were no vitals taken for this visit.    Wt Readings from Last 4 Encounters:   02/07/22 102.1 kg (225 lb)   02/07/22 105.1 kg (231 lb 12.8 oz)   01/27/22 103.8 kg (228 lb 14.4 oz)   01/15/22 102.1 kg (225 lb)     General: NAD  HEENT: sclera anicteric.  Notes tearing.  No obvious dry eye.  No subconjunctival hemorrhage.  Lacy buccal mucosa b/l with some erythema.  No lesions  Lungs:  CTA b/l  CV: RRR  Ext/ Skin: faint macular erythema over abdomen- centrally more brown/gerard color lateral chest/torso new faintly erythematous lesion which were first noted this weekend. New marginally rashed slightly erythematous lesions scattered across full upper and lower aspects of bilateral arms. Groin/supra pubic area now longerly beefy red, but more deep purple colored.  Psych:  Good outlook       Current aGVHD staging:  Skin 3 (50% BSA)- spares, face, head, lower extremities, UGI 0, LGI 0, Liver 0 (1/17/2022)    Labs:  Lab Results   Component Value Date    WBC 7.8 02/07/2022    ANEU 3.5  10/26/2021    HGB 13.4 02/07/2022    HCT 40.8 02/07/2022    PLT 92 (L) 02/07/2022     02/07/2022    POTASSIUM 4.5 02/07/2022    CHLORIDE 105 02/07/2022    CO2 23 02/07/2022     (H) 02/07/2022    BUN 26 02/07/2022    CR 1.32 (H) 02/07/2022    MAG 1.8 01/04/2022    INR 0.96 02/07/2022    BILITOTAL 0.6 02/07/2022     (H) 02/07/2022     (H) 02/07/2022    ALKPHOS 119 02/07/2022    PROTTOTAL 6.9 02/07/2022    ALBUMIN 3.3 (L) 02/07/2022       I have assessed all abnormal lab values for their clinical significance and any values considered clinically significant have been addressed in the assessment and plan.        ASSESSMENT AND PLAN   Nik Nava is a 61 year old male with Ph Pos ALL, day 181 s/p sib allo stem cell transplant    Day -6 (8/4): flu/cy  Day -5 through day -2 (8/5-8/8): flu  Day -1 (8/9): TBI  Day 0 (8/10): transplant     1.  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 S/p allo sib PBSCT. (ABO matched)  HCT-CI score: 3 (prior solid tumor)  Day +100 bone marrow biopsy is 100% donor with no morphologic or flow cytometric evidence of leukemia BCR abl is pending.    - 2/7 day +180 restaging BM bx- done today.     2.  HEME:   - Transfuse for hgb <7g/dL; plt < 10k.  No transfusions needs  -  pulmonary emboli: He developed a pulmonary emboli in the setting of receiving PEG asparaginase. On Xarelto because this was a provoked thrombus. Continue for anticoagulation for now.  BMT office will call to remind him to hold prior to BM bx.  - Held for bmbx -- resume per proceduralist either tonight or tomorrow night.    3.  FEN/Renal: Oral intake good/stable  - Cr stable  - Tac induced hypomag, decrease to 2tabs per day 12/14.   - He has a history of renal cancer and resection. has a single kidney.    4.    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   - Stop antifungal cream to groin as no evidence of yeast currently. Start Hydrocortisone cream daily.   - New chronic GVHD symptoms mouth and eyes. Injected, watering eyes. See optho 2/10.   - 2/7 Mouth: lichnoid changes on soft palpate and bilateral buccal mucosa. Start dex swish and spit QID today. Also script for tac ointment to lips BID.   - Discussed with Dr Hill - he will see Nik 2/10. Await feedback from GVHD team if pt would be eligble for any GVHD studies. ALso helpful to have marrow results back before determining what degree to increase immunosuppression.   - LFTS also trending up but tbili 0.6, not on azole  -  Given formal tac taper 12/21--held for about 4 days (called in with ? Worsening rash); new calendar given today.  Dosing now is 0.5mg  BID.  - skin biopsied 8/30 (back)-subtle interface dermatitis, suggestive of gvhd? Back without any rash 12/21  - close f/u of mouth changes, eye lacrimation; early cGVHD?  Note of recent oral thrush.  No dex s/s today.      5.  ID: Afebrile  - stop steroid cream today- suprapubic area is no longer red. More deep purple/healing gvhd.   - prophy acv (letrmovir stopped at day +100), fluconazole, Pentamidine (1/10 at [facility]).  - CMV neg 1/4  - influenza vacc given 10/26.  Covid vaccine given November 18- given second dose 12/14     Pt given info on SHIVANI-- Plan to give 2/10 when back in clinic.     6. Hx of graves disease; On synthroid 175 mcg daily     7. CV: Norvasc 5mg daily. BP stable today, likely will need to taper as on tac taper.    8. GI: omeprazole for heartburn      Plan:   - stopclotrimazole cream to pubic area resume hydrocortisone cream once daily  - Resume TMC cream to body rash BID   - Start dex swish and spit to mouth  - Protopic to lips BID  - Increase tac to 1mg qAm and 0.5mg wHS   - REsume xeralto today or tomorrow.   - 2/10: review w/ Dr. Hill; f/u  LFT.  If ALT/AST rising, could consider adding back ursodiol; IH pentamidine;  - Ordered evusheld- will get 2/10 as need to have pt go upstairs now for sedated bmbx.      40 minutes spent on the date of the encounter doing BMT chart review, interpretation of prior labs    Nuvia Wisdom PA-C  [phone]

## 2022-02-07 NOTE — TELEPHONE ENCOUNTER
Spoke with patient and wife regarding scheduling a sooner appointment for Transportation needs from wife. Rescheduled patient accordingly and patient is aware of timme, date and location.-Per Patient's wife and patient

## 2022-02-07 NOTE — DISCHARGE INSTRUCTIONS
How to Care for your Bone Marrow Biopsy    Activity  Relax and take it easy for the next 24 hours.   Resume regular activity after 24 hours.    Diet   Resume pre-procedure diet and drink plenty of fluids.    If you received sedation, you may feel a little nauseated so start with a clear liquid diet until the nausea passes.    Do Not Immerse Bone Marrow Biopsy Puncture Site in Water  Do not take a bath until the puncture site has healed.  Do not sit in a hot tub or spa until the puncture site has healed.  Do not swim until the puncture site has healed.  Wait 24 hours before taking a shower.    Drainage  Drainage should be minimal.  IF bleeding should occur and soaks through the dressing, lie down and put pressure on the puncture site.    IF bleeding persists, apply gentle pressure with your hand over the dressing for 5 minutes.    IF the pressure doesn't stop the bleeding, contact your provider immediately.    Dressing  Keep the dressing dry and in place for 24 hours, unless instructed otherwise.    IF bleeding soaks through the dressing in the first 24 hours do NOT remove the dressing as you may pull off any scab that has formed.  Instead, reinforce the dressing with extra gauze and tape.    No Alcohol  Do not drink alcoholic beverages for the next 24 hours.    No Driving or Operating Machinery  No driving or operating machinery for the next 24 hours.    Notify your provider IF:    Excessive bleeding or drainage at the puncture site    Excessive swelling, redness or tenderness at the puncture site    Fever above 100.5 degrees taken orally    Severe pain    Drainage that is green, yellow, thick white or has a bad odor    Telephone Numbers  Bone Marrow transplant clinic:  [phone] (Monday thru Friday, 8:00 am to 4:00 pm)  After business hours call the [facility]:  [phone] and ask for the Hematology/BMT doctor on call.  Or call the Emergency Room at the [facility]  [facility]:  [phone].

## 2022-02-07 NOTE — ANESTHESIA PREPROCEDURE EVALUATION
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]    Procedure : Procedure(s):  BIOPSY, BONE MARROW          Past Medical History:   Diagnosis Date     Cancer (H)     renal cell     CKD (chronic kidney disease)      Thyroid disease       Past Surgical History:   Procedure Laterality Date     BACK SURGERY  2017     BONE MARROW BIOPSY, BONE SPECIMEN, NEEDLE/TROCAR Left 2021    Procedure: BIOPSY, BONE MARROW;  Surgeon: Jailyn Ny APRN CNP;  Location: [facility] OR     BONE MARROW BIOPSY, BONE SPECIMEN, NEEDLE/TROCAR Left 11/15/2021    Procedure: BIOPSY, BONE MARROW;  Surgeon: Socrates De La Torre;  Location: [facility] OR     IR CVC TUNNEL PLACEMENT > 5 YRS OF AGE  2021     IR CVC TUNNEL REMOVAL LEFT  2021      Allergies   Allergen Reactions     Sulfamethoxazole W/Trimethoprim Rash      Social History     Tobacco Use     Smoking status: Former Smoker     Packs/day: 2.00     Years: 30.00     Pack years: 60.00     Quit date: 2019     Years since quittin.7     Smokeless tobacco: Never Used   Substance Use Topics     Alcohol use: Not Currently      Wt Readings from Last 1 Encounters:   22 102.1 kg (225 lb)        Anesthesia Evaluation   Pt has had prior anesthetic.     No history of anesthetic complications       ROS/MED HX  ENT/Pulmonary:     (+) tobacco use, Past use,     Neurologic:       Cardiovascular:     (+) hypertension-----Previous cardiac testing   Echo: Date: 21 Results:  Interpretation Summary  Global and regional left ventricular function is normal with an EF of 55-60%.  3D LVEF volumetric analysis is 59%.  Global peak LV longitudinal strain is averaged at -20%. This is normal (normal  <-18%).  Global right ventricular function is normal. The right ventricle is normal  size.  No significant valvular abnormalities.  IVC diameter <2.1 cm  collapsing >50% with sniff suggests a normal RA pressure  of 3 mmHg.  There is no prior study for direct comparison.  Stress Test: Date: Results:    ECG Reviewed: Date: Results:    Cath: Date: Results:      METS/Exercise Tolerance:     Hematologic:     (+) anemia,     Musculoskeletal:       GI/Hepatic:       Renal/Genitourinary:     (+) renal disease, type: CRI,     Endo:     (+) thyroid problem, hypothyroidism, Obesity,     Psychiatric/Substance Use:       Infectious Disease:       Malignancy: Comment: Renal cancer, ALL      Other:            Physical Exam    Airway  airway exam normal      Mallampati: I   TM distance: > 3 FB   Neck ROM: full   Mouth opening: > 3 cm    Respiratory Devices and Support         Dental  no notable dental history         Cardiovascular   cardiovascular exam normal       Rhythm and rate: regular and normal     Pulmonary   pulmonary exam normal        breath sounds clear to auscultation           OUTSIDE LABS:  CBC:   Lab Results   Component Value Date    WBC 7.8 02/07/2022    WBC 7.7 01/27/2022    HGB 13.4 02/07/2022    HGB 13.6 01/27/2022    HCT 40.8 02/07/2022    HCT 40.7 01/27/2022    PLT 92 (L) 02/07/2022     (L) 01/27/2022     BMP:   Lab Results   Component Value Date     02/07/2022     01/27/2022    POTASSIUM 4.5 02/07/2022    POTASSIUM 4.4 01/27/2022    CHLORIDE 105 02/07/2022    CHLORIDE 106 01/27/2022    CO2 23 02/07/2022    CO2 24 01/27/2022    BUN 26 02/07/2022    BUN 26 01/27/2022    CR 1.32 (H) 02/07/2022    CR 1.22 01/27/2022     (H) 02/07/2022     (H) 01/27/2022     COAGS:   Lab Results   Component Value Date    PTT 28 08/03/2021    INR 0.96 02/07/2022     POC: No results found for: BGM, HCG, HCGS  HEPATIC:   Lab Results   Component Value Date    ALBUMIN 3.3 (L) 02/07/2022    PROTTOTAL 6.9 02/07/2022     (H) 02/07/2022     (H) 02/07/2022    ALKPHOS 119 02/07/2022    BILITOTAL 0.6 02/07/2022     OTHER:   Lab Results   Component  Value Date    LACT 0.6 (L) 09/12/2021    DAMON 9.3 02/07/2022    PHOS 3.5 08/22/2021    MAG 1.8 01/04/2022    TSH 0.23 (L) 11/18/2021    T4 1.33 11/18/2021    CRP 4.5 09/12/2021    SED 56 (H) 09/12/2021       Anesthesia Plan    ASA Status:  3                    Consents            Postoperative Care            Comments:                Ayan Gauthier DO

## 2022-02-07 NOTE — ANESTHESIA POSTPROCEDURE EVALUATION
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    Anesthesia Type:  No value filed.    Note:  Disposition: Outpatient   Postop Pain Control: Uneventful            Sign Out: Well controlled pain   PONV: No   Neuro/Psych: Uneventful            Sign Out: Acceptable/Baseline neuro status   Airway/Respiratory: Uneventful            Sign Out: Acceptable/Baseline resp. status   CV/Hemodynamics: Uneventful            Sign Out: Acceptable CV status   Other NRE: NONE   DID A NON-ROUTINE EVENT OCCUR? No           Last vitals:  Vitals Value Taken Time   /74 02/07/22 1306   Temp 36.5  C (97.7  F) 02/07/22 1306   Pulse 80 02/07/22 1306   Resp 16 02/07/22 1306   SpO2 97 % 02/07/22 1306       Electronically Signed By: Ayan Gauthier DO  February 7, 2022  1:16 PM

## 2022-02-07 NOTE — NURSING NOTE
"Oncology Rooming Note    February 7, 2022 9:57 AM   Nik Nava is a 61 year old male who presents for:    Chief Complaint   Patient presents with     Port Draw     Labs drawn via port by RN. Vitals taken.     Oncology Clinic Visit     ALL     Initial Vitals: /73 (BP Location: Right arm)   Pulse 82   Temp 97.9  F (36.6  C) (Oral)   Resp 16   Wt 105.1 kg (231 lb 12.8 oz)   SpO2 97%   BMI 30.58 kg/m   Estimated body mass index is 30.58 kg/m  as calculated from the following:    Height as of 1/15/22: 1.854 m (6' 1\").    Weight as of this encounter: 105.1 kg (231 lb 12.8 oz). Body surface area is 2.33 meters squared.  No Pain (0) Comment: Data Unavailable   No LMP for male patient.  Allergies reviewed: Yes  Medications reviewed: Yes    Medications: MEDICATION REFILLS NEEDED TODAY. Provider was notified.   Tacrolimus 0.5 mg    Pharmacy name entered into Voxound:    [facility] - [place], [place] - [address] [address] [facility] [place], [place] - [phone]    Clinical concerns: eyes blood shot and drainage        Praveen Scales            "

## 2022-02-07 NOTE — ANESTHESIA CARE TRANSFER NOTE
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    Anesthesia Type:   No value filed.     Note:    Oropharynx: spontaneously breathing  Level of Consciousness: awake  Oxygen Supplementation: room air    Independent Airway: airway patency satisfactory and stable  Dentition: dentition unchanged  Vital Signs Stable: post-procedure vital signs reviewed and stable  Report to RN Given: handoff report given  Patient transferred to: Phase II  Comments: Resps easy and regular. Report to Phase II RN  Handoff Report: Identifed the Patient, Identified the Reponsible Provider, Reviewed the pertinent medical history, Discussed the surgical course, Reviewed Intra-OP anesthesia mangement and issues during anesthesia, Set expectations for post-procedure period and Allowed opportunity for questions and acknowledgement of understanding      Vitals:  Vitals Value Taken Time   BP 90/61 02/07/22 1228   Temp 36.3  C (97.3  F) 02/07/22 1228   Pulse 84 02/07/22 1228   Resp 16 02/07/22 1228   SpO2 95 % 02/07/22 1228       Electronically Signed By: PEE AJ CRNA  February 7, 2022  12:35 PM

## 2022-02-07 NOTE — PROCEDURES
"BMT ONC Adult Bone Marrow Biopsy Procedure Note  February 7, 2022  BP 90/61 (Cuff Size: Adult Small)   Pulse 84   Temp 97.3  F (36.3  C) (Temporal)   Resp 16   Ht 1.854 m (6' 1\")   Wt 102.1 kg (225 lb)   SpO2 95%   BMI 29.69 kg/m       Learning needs assessment complete within 12 months? 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 Informed consent was obtained. Following the administration of Propofol as pre-medication, patient was placed in the prone position and prepped and draped in a sterile manner. Approximately 10 cc of 1% Lidocaine was used over the left posterior iliac spine. Following this a 3 mm incision was made. Trephine bone marrow core(s) was (were) obtained from the [facility]. Bone marrow aspirates were obtained from the [facility]. Aspirates were sent for morphology, immunophenotyping, cytogenetics and molecular diagnostics RFLP. A total of approximately 20 ml of marrow was aspirated. Following this procedure a sterile dressing was applied to the bone marrow biopsy site(s). The patient was placed in the supine position to maintain pressure on the biopsy site. Post-procedure wound care instructions were given.     Complications: NO    Interventions: NO    Length of procedure:20 minutes or less      Procedure performed by: Renetta Wiggins PA-C  [phone]        "

## 2022-02-07 NOTE — LETTER
2/7/2022     RE: Nik Nava  [address] [address]    Dear Colleague,    Thank you for referring your patient, Nik Nava, to the [facility] BLOOD AND MARROW TRANSPLANT PROGRAM [place]. Please see a copy of my visit note below.    See procedure note other encounter.  Renetta Wiggins PA-C  [phone]          Again, thank you for allowing me to participate in the care of your patient.        Sincerely,        [facility] BONE MARROW BIOPSY

## 2022-02-07 NOTE — NURSING NOTE
"Chief Complaint   Patient presents with     Port Draw     Labs drawn via port by RN. Vitals taken.     Port accessed with 20G 3/4\" flat needle by RN, labs collected, line flushed with saline and heparin.  Vitals taken. Pt checked in for appointment(s).      Josiane Chou, RN    "

## 2022-02-08 LAB
INTERPRETATION: NORMAL
PATH REPORT.COMMENTS IMP SPEC: NORMAL
PATH REPORT.FINAL DX SPEC: NORMAL
PATH REPORT.FINAL DX SPEC: NORMAL
PATH REPORT.GROSS SPEC: NORMAL
PATH REPORT.MICROSCOPIC SPEC OTHER STN: NORMAL
PATH REPORT.RELEVANT HX SPEC: NORMAL
PATH REPORT.RELEVANT HX SPEC: NORMAL

## 2022-02-10 ENCOUNTER — OFFICE VISIT (OUTPATIENT)
Dept: OPHTHALMOLOGY | Facility: CLINIC | Age: 62
End: 2022-02-10
Attending: PHYSICIAN ASSISTANT
Payer: COMMERCIAL

## 2022-02-10 ENCOUNTER — OFFICE VISIT (OUTPATIENT)
Dept: TRANSPLANT | Facility: CLINIC | Age: 62
End: 2022-02-10
Attending: INTERNAL MEDICINE
Payer: COMMERCIAL

## 2022-02-10 VITALS
OXYGEN SATURATION: 97 % | WEIGHT: 232.6 LBS | BODY MASS INDEX: 30.83 KG/M2 | RESPIRATION RATE: 16 BRPM | HEIGHT: 73 IN | TEMPERATURE: 97.7 F | DIASTOLIC BLOOD PRESSURE: 72 MMHG | SYSTOLIC BLOOD PRESSURE: 134 MMHG | HEART RATE: 75 BPM

## 2022-02-10 DIAGNOSIS — H04.129 DRY EYE: Primary | ICD-10-CM

## 2022-02-10 DIAGNOSIS — Z94.81 STATUS POST BONE MARROW TRANSPLANT (H): ICD-10-CM

## 2022-02-10 DIAGNOSIS — H52.03 HYPEROPIA WITH PRESBYOPIA OF BOTH EYES: ICD-10-CM

## 2022-02-10 DIAGNOSIS — C91.01 ACUTE LYMPHOBLASTIC LEUKEMIA (ALL) IN REMISSION (H): Primary | ICD-10-CM

## 2022-02-10 DIAGNOSIS — H52.4 HYPEROPIA WITH PRESBYOPIA OF BOTH EYES: ICD-10-CM

## 2022-02-10 DIAGNOSIS — Z94.81 STATUS POST BONE MARROW TRANSPLANT (H): Primary | ICD-10-CM

## 2022-02-10 PROCEDURE — G0463 HOSPITAL OUTPT CLINIC VISIT: HCPCS

## 2022-02-10 PROCEDURE — 99213 OFFICE O/P EST LOW 20 MIN: CPT | Performed by: OPTOMETRIST

## 2022-02-10 PROCEDURE — 94640 AIRWAY INHALATION TREATMENT: CPT | Performed by: INTERNAL MEDICINE

## 2022-02-10 PROCEDURE — 94642 AEROSOL INHALATION TREATMENT: CPT | Performed by: INTERNAL MEDICINE

## 2022-02-10 PROCEDURE — M0220 HC INJECTION TIXAGEVIMAB & CILGAVIMAB (EVUSHELD): HCPCS | Performed by: INTERNAL MEDICINE

## 2022-02-10 PROCEDURE — 96372 THER/PROPH/DIAG INJ SC/IM: CPT | Performed by: INTERNAL MEDICINE

## 2022-02-10 PROCEDURE — 99214 OFFICE O/P EST MOD 30 MIN: CPT | Performed by: INTERNAL MEDICINE

## 2022-02-10 PROCEDURE — G0463 HOSPITAL OUTPT CLINIC VISIT: HCPCS | Mod: 25

## 2022-02-10 PROCEDURE — 250N000011 HC RX IP 250 OP 636: Performed by: INTERNAL MEDICINE

## 2022-02-10 RX ORDER — ALBUTEROL SULFATE 5 MG/ML
2.5 SOLUTION RESPIRATORY (INHALATION) EVERY 6 HOURS PRN
Status: CANCELLED
Start: 2022-03-07

## 2022-02-10 RX ORDER — PENTAMIDINE ISETHIONATE 300 MG/300MG
300 INHALANT RESPIRATORY (INHALATION)
Status: DISCONTINUED | OUTPATIENT
Start: 2022-02-10 | End: 2022-02-10 | Stop reason: HOSPADM

## 2022-02-10 RX ORDER — PENTAMIDINE ISETHIONATE 300 MG/300MG
300 INHALANT RESPIRATORY (INHALATION)
Status: CANCELLED
Start: 2022-03-07

## 2022-02-10 RX ORDER — CARBOXYMETHYLCELLULOSE SODIUM AND GLYCERIN 5; 10 MG/ML; MG/ML
1 SOLUTION/ DROPS OPHTHALMIC 4 TIMES DAILY
Qty: 30 EACH | Refills: 11 | Status: SHIPPED | OUTPATIENT
Start: 2022-02-10

## 2022-02-10 RX ORDER — NEOMYCIN SULFATE, POLYMYXIN B SULFATE, AND DEXAMETHASONE 3.5; 10000; 1 MG/G; [USP'U]/G; MG/G
0.5 OINTMENT OPHTHALMIC AT BEDTIME
Qty: 3.5 G | Refills: 4 | Status: SHIPPED | OUTPATIENT
Start: 2022-02-10 | End: 2022-02-24

## 2022-02-10 RX ORDER — HEPARIN SODIUM,PORCINE 10 UNIT/ML
5 VIAL (ML) INTRAVENOUS
Status: CANCELLED | OUTPATIENT
Start: 2022-03-07

## 2022-02-10 RX ORDER — HEPARIN SODIUM (PORCINE) LOCK FLUSH IV SOLN 100 UNIT/ML 100 UNIT/ML
5 SOLUTION INTRAVENOUS
Status: CANCELLED | OUTPATIENT
Start: 2022-03-07

## 2022-02-10 RX ORDER — TACROLIMUS 1 MG/1
CAPSULE ORAL
Qty: 120 CAPSULE | Refills: 0 | COMMUNITY
Start: 2022-02-10 | End: 2022-02-24

## 2022-02-10 RX ORDER — TRIAMCINOLONE ACETONIDE 1 MG/G
OINTMENT TOPICAL 2 TIMES DAILY
Qty: 80 G | Refills: 1 | Status: SHIPPED | OUTPATIENT
Start: 2022-02-10 | End: 2022-02-16

## 2022-02-10 RX ORDER — ALBUTEROL SULFATE 5 MG/ML
2.5 SOLUTION RESPIRATORY (INHALATION) EVERY 6 HOURS PRN
Status: DISCONTINUED | OUTPATIENT
Start: 2022-02-10 | End: 2022-02-10 | Stop reason: HOSPADM

## 2022-02-10 RX ORDER — TRIAZOLAM 0.12 MG/1
0.12 TABLET ORAL
Qty: 30 TABLET | Refills: 0 | Status: SHIPPED | OUTPATIENT
Start: 2022-02-10 | End: 2022-02-16

## 2022-02-10 RX ADMIN — PENTAMIDINE ISETHIONATE 300 MG: 300 INHALANT RESPIRATORY (INHALATION) at 13:39

## 2022-02-10 RX ADMIN — Medication: at 16:39

## 2022-02-10 ASSESSMENT — CONF VISUAL FIELD
METHOD: COUNTING FINGERS
OD_NORMAL: 1
OS_NORMAL: 1

## 2022-02-10 ASSESSMENT — EXTERNAL EXAM - RIGHT EYE: OD_EXAM: NORMAL

## 2022-02-10 ASSESSMENT — CUP TO DISC RATIO
OS_RATIO: 0.55
OD_RATIO: 0.55

## 2022-02-10 ASSESSMENT — REFRACTION_MANIFEST
OD_CYLINDER: SPHERE
OS_ADD: +2.50
OS_SPHERE: +1.50
OS_CYLINDER: SPHERE
OD_SPHERE: +1.75
OD_ADD: +2.50

## 2022-02-10 ASSESSMENT — VISUAL ACUITY
METHOD: SNELLEN - LINEAR
OD_PH_CC+: -2
OD_PH_CC: 20/20
OS_SC: 20/40
OS_PH_CC: 20/20
METHOD_MR: DIAGNOSTIC MR
OD_SC: 20/60
OS_PH_CC+: -2

## 2022-02-10 ASSESSMENT — PAIN SCALES - GENERAL: PAINLEVEL: NO PAIN (0)

## 2022-02-10 ASSESSMENT — EXTERNAL EXAM - LEFT EYE: OS_EXAM: NORMAL

## 2022-02-10 ASSESSMENT — TONOMETRY
IOP_METHOD: ICARE
OS_IOP_MMHG: 9
OD_IOP_MMHG: 12

## 2022-02-10 ASSESSMENT — MIFFLIN-ST. JEOR: SCORE: 1903.82

## 2022-02-10 NOTE — LETTER
"    2/10/2022         RE: Nik Nava  [address] [address] [place] [address]      BMT Clinic Note    ID:  Nik Nava is a 60 yo man D+185 s/p NMA allo sib PBSCT for Ph+ ALL    HPI: NOtes that rash had been stable but he has ongoing dry/tearing eyes.  Earlier this week he had new rash on lateral torso (had just been healing/brown rash) also new rash on bilateral arms. He also has slight sensitivity to hot/cold in mouth, no mouth sores, lips are chapped and tight which is unchanged last few weeks.  No n/v/d.   No new resp or cardiac or bone/joint problems.    Review of Systems                                                                                                                                         8 point Review of systems was o/w negative     PHYSICAL EXAM                                                                                                                                                 KPS: 90  Blood pressure 134/72, pulse 75, temperature 97.7  F (36.5  C), resp. rate 16, height 1.854 m (6' 0.99\"), weight 105.5 kg (232 lb 9.6 oz), SpO2 97 %.    Wt Readings from Last 4 Encounters:   02/10/22 105.5 kg (232 lb 9.6 oz)   02/07/22 102.1 kg (225 lb)   02/07/22 105.1 kg (231 lb 12.8 oz)   01/27/22 103.8 kg (228 lb 14.4 oz)     General: NAD  HEENT: sclera anicteric. Sl conj erythema and tearing   No oral ulcers or erythema    Lungs:  CTA b/l   no wheezes  CV: RRR  no gallop  Ext/ Skin: faint macular erythema over abdomen- centrally more brown/gerard color lateral chest/torso  sl  faintly erythematous lesion marginally rashed slightly erythematous lesions scattered on arms but fading since steroid creams started. Groin/supra pubic area pink/purple; not pruritic  Psych:  Good outlook       Current aGVHD staging:  Skin 3 (50% BSA)- spares, face, head, lower extremities, UGI 0, LGI 0, Liver 0 (1/17/2022)      ASSESSMENT AND PLAN   Nik Nava is a 61 year old male with Ph Pos ALL, day 185 " s/p sib allo stem cell transplant    Day -6 (8/4): flu/cy  Day -5 through day -2 (8/5-8/8): flu  Day -1 (8/9): TBI  Day 0 (8/10): transplant     1.  Acute lymphoblastic leukemia, [place] chromosome positive in CR, MRD neg. S/p allo sib PBSCT. (ABO matched)  HCT-CI score: 3 (prior solid tumor)  Day +100 bone marrow biopsy is 100% donor with no morphologic or flow cytometric evidence of leukemia BCR abl is pending.    - 2/7 day +180 restaging BM bx- still in CR  100% donor;  BCR Abl was missed; will reorder on blood  cytogenetics pending    2.  HEME:   - Transfuse for hgb <7g/dL; plt < 10k.  No transfusions needs  -  pulmonary emboli: He developed a pulmonary emboli in the setting of receiving PEG asparaginase.   On Xarelto but because this was a provoked thrombus when the next phase of his treatment is settled; this can be discontinued.   Continue for anticoagulation for now.  Likely stop by end of February.      3.  FEN/Renal: Oral intake good/stable  - Cr stable  - Tac induced hypomag, Off now but follow Mag  - He has a history of renal cancer and resection. has a single kidney.    4.    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    - Stop antifungal cream to groin as no evidence of yeast currently. Start Hydrocortisone cream to rash on face  - New chronic GVHD symptoms mouth and eyes. Injected, watering eyes. See optho 2/10.   - 2/7 Mouth: lichnoid changes on soft palpate and bilateral buccal mucosa. Start dex swish and spit QID 2/7 but feels better today.  Using tac ointment to lips BID.   -  - LFTS also trending up but tbili 0.6,   - skin biopsied 8/30 (back)-subtle interface dermatitis, suggestive of gvhd? Back without any rash 12/21  - close f/u of mouth changes, eye lacrimation;   Note of recent oral thrush.  No dex s/s today.    Could be limited cGVHD but eyes  mild; mouth minimal and rash seems somewhat better with topical TMC (will extend it to suprapubic region) and HC to face.  Will cont on tac without further taper and observe.  If it clearly progresses, then will add new therapy   (and enroll on PQRST observational Study); but not clearly needing added systemic therapy now.    5.  ID: Afebrile  - prophy acv (letrmovir stopped at day +100), fluconazole, Pentamidine (1/10 at [facility]).  - CMV neg 1/4 and 2/7    - influenza vacc given 10/26.  Covid vaccine given November 18- given second dose 12/14     Pt given info on EVUSHELD--  6. Hx of graves disease; On synthroid 175 mcg daily     7. CV: Norvasc 5mg daily. BP stable today,    8. GI: omeprazole for heartburn      Plan:   - stopclotrimazole cream to pubic area   Use TMC cream once daily  - Resume TMC cream to body rash BID  and will try TMC ointment as well to see if it helps dry skin itching  - continue dex swish and spit to mouth  Halcion for sleep prn    - Protopic to lips BID  - Continue tac to 1mg qAm and 0.5mg wHS   -  xarelto likely only thru late february then stop     -   evusheld- given 2/10       Chapo Hill MD      Results for CASTROCONNER WRIGHT HARRY (MRN [id number]) as of 2/11/2022 11:01   Ref. Range 1/27/2022 08:45 2/3/2022 09:48 2/7/2022 09:45 2/7/2022 12:19 2/7/2022 12:20   Sodium Latest Ref Range: 133 - 144 mmol/L 137  135     Potassium Latest Ref Range: 3.4 - 5.3 mmol/L 4.4  4.5     Chloride Latest Ref Range: 94 - 109 mmol/L 106  105     Carbon Dioxide Latest Ref Range: 20 - 32 mmol/L 24  23     Urea Nitrogen Latest Ref Range: 7 - 30 mg/dL 26  26     Creatinine Latest Ref Range: 0.66 - 1.25 mg/dL 1.22  1.32 (H)     GFR Estimate Latest Ref Range: >60 mL/min/1.73m2 67  61     Calcium Latest Ref Range: 8.5 - 10.1 mg/dL 9.6  9.3     Anion Gap Latest Ref Range: 3 - 14 mmol/L 7  7     Albumin Latest Ref Range: 3.4 - 5.0 g/dL 3.8  3.3 (L)     Protein Total Latest Ref Range: 6.8 - 8.8 g/dL 7.4  6.9      Bilirubin Total Latest Ref Range: 0.2 - 1.3 mg/dL 0.6  0.6     Alkaline Phosphatase Latest Ref Range: 40 - 150 U/L 100  119     ALT Latest Ref Range: 0 - 70 U/L 100 (H)  146 (H)     AST Latest Ref Range: 0 - 45 U/L 98 (H)  156 (H)     Glucose Latest Ref Range: 70 - 99 mg/dL 103 (H)  101 (H)     WBC Latest Ref Range: 4.0 - 11.0 10e3/uL 7.7  7.8     Hemoglobin Latest Ref Range: 13.3 - 17.7 g/dL 13.6  13.4     Hematocrit Latest Ref Range: 40.0 - 53.0 % 40.7  40.8     Platelet Count Latest Ref Range: 150 - 450 10e3/uL 116 (L)  92 (L)     RBC Count Latest Ref Range: 4.40 - 5.90 10e6/uL 4.34 (L)  4.38 (L)     MCV Latest Ref Range: 78 - 100 fL 94  93     MCH Latest Ref Range: 26.5 - 33.0 pg 31.3  30.6     MCHC Latest Ref Range: 31.5 - 36.5 g/dL 33.4  32.8     RDW Latest Ref Range: 10.0 - 15.0 % 14.6  14.9     % Neutrophils Latest Units: % 35  35     % Lymphocytes Latest Units: % 40  36     % Monocytes Latest Units: % 13  14     % Eosinophils Latest Units: % 11  13     % Basophils Latest Units: % 1  1     Absolute Basophils Latest Ref Range: 0.0 - 0.2 10e3/uL 0.1  0.1     Absolute Eosinophils Latest Ref Range: 0.0 - 0.7 10e3/uL 0.9 (H)  1.0 (H)     Absolute Immature Granulocytes Latest Ref Range: <=0.4 10e3/uL 0.0  0.0     Absolute Lymphocytes Latest Ref Range: 0.8 - 5.3 10e3/uL 3.0  2.9     Absolute Monocytes Latest Ref Range: 0.0 - 1.3 10e3/uL 1.0  1.1     % Immature Granulocytes Latest Units: % 0  1     Absolute Neutrophils Latest Ref Range: 1.6 - 8.3 10e3/uL 2.7  2.7     Absolute NRBCs Latest Units: 10e3/uL 0.0  0.0     NRBCs per 100 WBC Latest Ref Range: <1 /100 0  0     INR Latest Ref Range: 0.85 - 1.15    0.96     CMV Quant IU/mL Latest Ref Range: Not Detected IU/mL   Not Detected     SARS CoV2 PCR Latest Ref Range: Negative, Testing sent to reference lab. Results will be returned via unsolicited result   Negative      CMV QUANTITATIVE, PCR Unknown   Rpt     CHROMOSOME ANALYSIS, BONE MARROW, DIAGNOSIS/RELAPSE  Unknown     Rpt   DNA MARKER POST BMT ENGRAFTMENT BONE MARROW Unknown     Attch   FISH Unknown     Rpt   FLOW CYTOMETRY Unknown     Attch   Interpretation Unknown     This result conta...   DISCLAIMER Unknown     This result conta...   BONE MARROW BIOPSY Unknown    Attch      Bone marrow, left posterior iliac crest, decalcified trephine biopsy, touch imprint, direct aspirate smear, concentrated aspirate smear, and peripheral blood smear:                Marrow cellularity of 30%, with trilineage hematopoiesis; no morphologic or immunophenotypic evidence of recurrent/persistent B lymphoblastic leukemia (see comment)                Thrombocytopenia; slightly low RBC count; slight eosinophilia   Electronically signed by Kel Hirsch MD on 2/8/2022 at  1:55 PM   Comment     Concurrent flow cytometry was performed ([id number]), and no abnormal B lymphoblast population was identified.         Chapo Hill MD

## 2022-02-10 NOTE — NURSING NOTE
The following medication was given:     MEDICATION: Evusheld  ROUTE: IM  SITE: Ventrogluteal - Left & Right    See MAR for  Details.     Winnie Corrales RN

## 2022-02-10 NOTE — NURSING NOTE
Chief Complaints and History of Present Illnesses   Patient presents with     Eye Problem Both Eyes     Chief Complaint(s) and History of Present Illness(es)     Eye Problem Both Eyes     Laterality: both eyes    Course: stable    Associated symptoms: redness and tearing.  Negative for eye pain    Pain scale: 0/10              Comments     Pt notes he has been having intermittent red, watery eyes x 4 weeks.  BMT was done 8/10/2021 - was told could possibly be GVHD.  Pt notes vision BE is good and stable - becomes blurry when eyes start watering.  Ocular meds: Blink tears PRN BE    Viviana Queen OT 9:05 AM February 10, 2022

## 2022-02-10 NOTE — NURSING NOTE
"Oncology Rooming Note    February 10, 2022 3:09 PM   Nik Nava is a 61 year old male who presents for:    Chief Complaint   Patient presents with     Oncology Clinic Visit     UM RETURN - ALL     Initial Vitals: /72   Pulse 75   Temp 97.7  F (36.5  C)   Resp 16   Ht 1.854 m (6' 0.99\")   Wt 105.5 kg (232 lb 9.6 oz)   SpO2 97%   BMI 30.69 kg/m   Estimated body mass index is 30.69 kg/m  as calculated from the following:    Height as of this encounter: 1.854 m (6' 0.99\").    Weight as of this encounter: 105.5 kg (232 lb 9.6 oz). Body surface area is 2.33 meters squared.  No Pain (0) Comment: Data Unavailable   No LMP for male patient.  Allergies reviewed: Yes  Medications reviewed: Yes    Medications: Medication refills not needed today.  Pharmacy name entered into Shoutitout:    [facility] - [place], [place] - [address] [facility] [place], [place] - [phone]    Clinical concerns: No new concerns. [facility] was notified.      Kelvin Gomez LPN            "

## 2022-02-10 NOTE — PROGRESS NOTES
A/P  1.) Dry Eye OU  -s/p BMT 08/2021  -Recently symptomatic for tearing/redness x 1-2 months  -Cornea excellent today, no staining  -Mild MGD/inflammation only. Rec starting warm compress and Maxitrol benny to lids at bedtime  -Continue AT 4x/day OU  -Reviewed other options for treatment if symptoms do not improve, including Restasis or scleral lens    2.) Hyperopia/Presbyopia OU  -Excellent acuity with plus Rx. He does report using readers for distance for quite a while  -Should be okay to get bifocals or use OTC as desired.    F/u 3-4 months cornea recheck and DFE    I have confirmed the patient's CC, HPI and reviewed Past Medical History, Past Surgical History, Social History, Family History, Problem List, Medication List and agree with Tech note.     Aisha Juarez, OD FAAO FSLS

## 2022-02-10 NOTE — PATIENT INSTRUCTIONS
Warm compresses: Use 1-2x/day for 5-10 minutes over closed eyelids  -Angelica mask  -Tranquileyes beaded mask  -Mibo heating pad  -[place] REST & RELIEF Eye Mask (Hot or Cold)  -I-RELIEF Therapy Mask  -OcuTherm Essentials Kit    You can also use a warm wet washcloth - however this frequently loses heat quickly and can dry your skin out a bit so we recommend any of the above re-usable beaded/gel eyemasks      To purchase these products you can look over-the-counter at [employer] or purchase online at the following websites:  -[email or web address]  -[email or web address]

## 2022-02-10 NOTE — LETTER
"  2/10/2022     RE: Nik Nava  [address] [facility] [id number]    Dear Colleague,    Thank you for referring your patient, Nik Nava, to the [facility] BLOOD AND MARROW TRANSPLANT PROGRAM [place]. Please see a copy of my visit note below.  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  Earlier this week he had new rash on lateral torso (had just been healing/brown rash) also new rash on bilateral arms. He also has slight sensitivity to hot/cold in mouth, no mouth sores, lips are chapped and tight which is unchanged last few weeks.  No n/v/d.   No new resp or cardiac or bone/joint problems.    Review of Systems                                                                                                                                         8 point Review of systems was o/w negative     PHYSICAL EXAM                                                                                                                                                 KPS: 90  Blood pressure 134/72, pulse 75, temperature 97.7  F (36.5  C), resp. rate 16, height 1.854 m (6' 0.99\"), weight 105.5 kg (232 lb 9.6 oz), SpO2 97 %.    Wt Readings from Last 4 Encounters:   02/10/22 105.5 kg (232 lb 9.6 oz)   02/07/22 102.1 kg (225 lb)   02/07/22 105.1 kg (231 lb 12.8 oz)   01/27/22 103.8 kg (228 lb 14.4 oz)     General: NAD  HEENT: sclera anicteric. Sl conj erythema and tearing   No oral ulcers or erythema    Lungs:  CTA b/l   no wheezes  CV: RRR  no gallop  Ext/ Skin: faint macular erythema over abdomen- centrally more brown/gerard color lateral chest/torso  sl  faintly erythematous lesion marginally rashed slightly erythematous lesions scattered on arms but fading since steroid creams started. 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 S/p allo sib PBSCT. (ABO matched)  HCT-CI score: 3 (prior solid tumor)  Day +100 bone marrow biopsy is 100% donor with no morphologic or flow cytometric evidence of leukemia BCR abl is pending.    - 2/7 day +180 restaging BM bx- still in CR  100% donor;  BCR Abl was missed; will reorder on blood  cytogenetics pending    2.  HEME:   - Transfuse for hgb <7g/dL; plt < 10k.  No transfusions needs  -  pulmonary emboli: He developed a pulmonary emboli in the setting of receiving PEG asparaginase.   On Xarelto but because this was a provoked thrombus when the next phase of his treatment is settled; this can be discontinued.   Continue for anticoagulation for now.  Likely stop by end of February.      3.  FEN/Renal: Oral intake good/stable  - Cr stable  - Tac induced hypomag, Off now but follow Mag  - He has a history of renal cancer and resection. has a single kidney.    4.    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    - Stop antifungal cream to groin as no evidence of yeast currently. Start Hydrocortisone cream to rash on face  - New chronic GVHD symptoms mouth and eyes. Injected, watering eyes. See optho 2/10.   - 2/7 Mouth: lichnoid changes on soft palpate and bilateral buccal mucosa. Start dex swish and spit QID 2/7 but feels better today.  Using tac ointment to lips BID.   -  - LFTS also trending up but tbili 0.6,   - skin biopsied 8/30 (back)-subtle interface dermatitis,  suggestive of gvhd? Back without any rash 12/21  - close f/u of mouth changes, eye lacrimation;   Note of recent oral thrush.  No dex s/s today.    Could be limited cGVHD but eyes mild; mouth minimal and rash seems somewhat better with topical TMC (will extend it to suprapubic region) and HC to face.  Will cont on tac without further taper and observe.  If it clearly progresses, then will add new therapy   (and enroll on PQRST observational Study); but not clearly needing added systemic therapy now.    5.  ID: Afebrile  - prophy acv (letrmovir stopped at day +100), fluconazole, Pentamidine (1/10 at [facility]).  - CMV neg 1/4 and 2/7    - influenza vacc given 10/26.  Covid vaccine given November 18- given second dose 12/14     Pt given info on EVUSHELD--  6. Hx of graves disease; On synthroid 175 mcg daily     7. CV: Norvasc 5mg daily. BP stable today,    8. GI: omeprazole for heartburn      Plan:   - stopclotrimazole cream to pubic area   Use TMC cream once daily  - Resume TMC cream to body rash BID  and will try TMC ointment as well to see if it helps dry skin itching  - continue dex swish and spit to mouth  Halcion for sleep prn    - Protopic to lips BID  - Continue tac to 1mg qAm and 0.5mg wHS   -  xarelto likely only thru late february then stop     -   evusheld- given 2/10       Chapo Hill MD      Results for CONNER MCKEON (MRN [id number]) as of 2/11/2022 11:01   Ref. Range 1/27/2022 08:45 2/3/2022 09:48 2/7/2022 09:45 2/7/2022 12:19 2/7/2022 12:20   Sodium Latest Ref Range: 133 - 144 mmol/L 137  135     Potassium Latest Ref Range: 3.4 - 5.3 mmol/L 4.4  4.5     Chloride Latest Ref Range: 94 - 109 mmol/L 106  105     Carbon Dioxide Latest Ref Range: 20 - 32 mmol/L 24  23     Urea Nitrogen Latest Ref Range: 7 - 30 mg/dL 26  26     Creatinine Latest Ref Range: 0.66 - 1.25 mg/dL 1.22  1.32 (H)     GFR Estimate Latest Ref Range: >60 mL/min/1.73m2 67  61     Calcium Latest Ref Range: 8.5 - 10.1 mg/dL 9.6   9.3     Anion Gap Latest Ref Range: 3 - 14 mmol/L 7  7     Albumin Latest Ref Range: 3.4 - 5.0 g/dL 3.8  3.3 (L)     Protein Total Latest Ref Range: 6.8 - 8.8 g/dL 7.4  6.9     Bilirubin Total Latest Ref Range: 0.2 - 1.3 mg/dL 0.6  0.6     Alkaline Phosphatase Latest Ref Range: 40 - 150 U/L 100  119     ALT Latest Ref Range: 0 - 70 U/L 100 (H)  146 (H)     AST Latest Ref Range: 0 - 45 U/L 98 (H)  156 (H)     Glucose Latest Ref Range: 70 - 99 mg/dL 103 (H)  101 (H)     WBC Latest Ref Range: 4.0 - 11.0 10e3/uL 7.7  7.8     Hemoglobin Latest Ref Range: 13.3 - 17.7 g/dL 13.6  13.4     Hematocrit Latest Ref Range: 40.0 - 53.0 % 40.7  40.8     Platelet Count Latest Ref Range: 150 - 450 10e3/uL 116 (L)  92 (L)     RBC Count Latest Ref Range: 4.40 - 5.90 10e6/uL 4.34 (L)  4.38 (L)     MCV Latest Ref Range: 78 - 100 fL 94  93     MCH Latest Ref Range: 26.5 - 33.0 pg 31.3  30.6     MCHC Latest Ref Range: 31.5 - 36.5 g/dL 33.4  32.8     RDW Latest Ref Range: 10.0 - 15.0 % 14.6  14.9     % Neutrophils Latest Units: % 35  35     % Lymphocytes Latest Units: % 40  36     % Monocytes Latest Units: % 13  14     % Eosinophils Latest Units: % 11  13     % Basophils Latest Units: % 1  1     Absolute Basophils Latest Ref Range: 0.0 - 0.2 10e3/uL 0.1  0.1     Absolute Eosinophils Latest Ref Range: 0.0 - 0.7 10e3/uL 0.9 (H)  1.0 (H)     Absolute Immature Granulocytes Latest Ref Range: <=0.4 10e3/uL 0.0  0.0     Absolute Lymphocytes Latest Ref Range: 0.8 - 5.3 10e3/uL 3.0  2.9     Absolute Monocytes Latest Ref Range: 0.0 - 1.3 10e3/uL 1.0  1.1     % Immature Granulocytes Latest Units: % 0  1     Absolute Neutrophils Latest Ref Range: 1.6 - 8.3 10e3/uL 2.7  2.7     Absolute NRBCs Latest Units: 10e3/uL 0.0  0.0     NRBCs per 100 WBC Latest Ref Range: <1 /100 0  0     INR Latest Ref Range: 0.85 - 1.15    0.96     CMV Quant IU/mL Latest Ref Range: Not Detected IU/mL   Not Detected     SARS CoV2 PCR Latest Ref Range: Negative, Testing sent to  reference lab. Results will be returned via unsolicited result   Negative      CMV QUANTITATIVE, PCR Unknown   Rpt     CHROMOSOME ANALYSIS, BONE MARROW, DIAGNOSIS/RELAPSE Unknown     Rpt   DNA MARKER POST BMT ENGRAFTMENT BONE MARROW Unknown     Attch   FISH Unknown     Rpt   FLOW CYTOMETRY Unknown     Attch   Interpretation Unknown     This result conta...   DISCLAIMER Unknown     This result conta...   BONE MARROW BIOPSY Unknown    Attch      Bone marrow, left posterior iliac crest, decalcified trephine biopsy, touch imprint, direct aspirate smear, concentrated aspirate smear, and peripheral blood smear:                Marrow cellularity of 30%, with trilineage hematopoiesis; no morphologic or immunophenotypic evidence of recurrent/persistent B lymphoblastic leukemia (see comment)                Thrombocytopenia; slightly low RBC count; slight eosinophilia   Electronically signed by Kel Hirsch MD on 2/8/2022 at  1:55 PM   Comment     Concurrent flow cytometry was performed ([id number]), and no abnormal B lymphoblast population was identified.     Again, thank you for allowing me to participate in the care of your patient.      Sincerely,    Chapo Hill MD

## 2022-02-10 NOTE — PROGRESS NOTES
Patient was seen today for a Pentamidine nebulizer tx ordered by Dr. Gay.    Patient was first given 2.5 mg/3mL albuterol nebulizer, after which Pentamidine 300 mg (Lot # [id number]) mixed with 6cc Sterile H2O was administered through a filtered nebulizer.    Pre-treatment: SpO2 = 98%     HR = 83     BBS = clear  Post-treatment: SpO2 = 97%     HR = 86     BBS = clear    No adverse side effects noted by the patient.    Procedure was completed today by AAMIR MCGRAW.

## 2022-02-11 NOTE — PROGRESS NOTES
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  Earlier this week he had new rash on lateral torso (had just been healing/brown rash) also new rash on bilateral arms. He also has slight sensitivity to hot/cold in mouth, no mouth sores, lips are chapped and tight which is unchanged last few weeks.  No n/v/d.   No new resp or cardiac or bone/joint problems.    Review of Systems                                                                                                                                         8 point Review of systems was o/w negative     PHYSICAL EXAM                                                                                                                                                 KPS: 90  Blood pressure 134/72, pulse 75, temperature 97.7  F (36.5  C), resp. rate 16, height 1.854 m (6' 0.99\"), weight 105.5 kg (232 lb 9.6 oz), SpO2 97 %.    Wt Readings from Last 4 Encounters:   02/10/22 105.5 kg (232 lb 9.6 oz)   02/07/22 102.1 kg (225 lb)   02/07/22 105.1 kg (231 lb 12.8 oz)   01/27/22 103.8 kg (228 lb 14.4 oz)     General: NAD  HEENT: sclera anicteric. Sl conj erythema and tearing   No oral ulcers or erythema    Lungs:  CTA b/l   no wheezes  CV: RRR  no gallop  Ext/ Skin: faint macular erythema over abdomen- centrally more brown/gerard color lateral chest/torso  sl  faintly erythematous lesion marginally rashed slightly erythematous lesions scattered on arms but fading since steroid creams started. 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 S/p allo sib PBSCT. (ABO matched)  HCT-CI score: 3 (prior solid tumor)  Day +100 bone marrow biopsy is 100% donor with no morphologic or flow cytometric evidence of leukemia BCR abl is pending.    - 2/7 day +180 restaging BM bx- still in CR  100% donor;  BCR Abl was missed; will reorder on blood  cytogenetics pending    2.  HEME:   - Transfuse for hgb <7g/dL; plt < 10k.  No transfusions needs  -  pulmonary emboli: He developed a pulmonary emboli in the setting of receiving PEG asparaginase.   On Xarelto but because this was a provoked thrombus when the next phase of his treatment is settled; this can be discontinued.   Continue for anticoagulation for now.  Likely stop by end of February.      3.  FEN/Renal: Oral intake good/stable  - Cr stable  - Tac induced hypomag, Off now but follow Mag  - He has a history of renal cancer and resection. has a single kidney.    4.    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    - Stop antifungal cream to groin as no evidence of yeast currently. Start Hydrocortisone cream to rash on face  - New chronic GVHD symptoms mouth and eyes. Injected, watering eyes. See optho 2/10.   - 2/7 Mouth: lichnoid changes on soft palpate and bilateral buccal mucosa. Start dex swish and spit QID 2/7 but feels better today.  Using tac ointment to lips BID.   -  - LFTS also trending up but tbili 0.6,   - skin biopsied 8/30 (back)-subtle interface dermatitis, suggestive of gvhd? Back without any rash 12/21  - close f/u of mouth changes, eye lacrimation;   Note of recent oral thrush.  No dex s/s today.    Could be limited cGVHD but eyes mild; mouth minimal and rash seems somewhat better with topical TMC (will extend it  to suprapubic region) and HC to face.  Will cont on tac without further taper and observe.  If it clearly progresses, then will add new therapy   (and enroll on PQRST observational Study); but not clearly needing added systemic therapy now.    5.  ID: Afebrile  - prophy acv (letrmovir stopped at day +100), fluconazole, Pentamidine (1/10 at [facility]).  - CMV neg 1/4 and 2/7    - influenza vacc given 10/26.  Covid vaccine given November 18- given second dose 12/14     Pt given info on EVUSHELD--  6. Hx of graves disease; On synthroid 175 mcg daily     7. CV: Norvasc 5mg daily. BP stable today,    8. GI: omeprazole for heartburn      Plan:   - stopclotrimazole cream to pubic area   Use TMC cream once daily  - Resume TMC cream to body rash BID  and will try TMC ointment as well to see if it helps dry skin itching  - continue dex swish and spit to mouth  Halcion for sleep prn    - Protopic to lips BID  - Continue tac to 1mg qAm and 0.5mg wHS   -  xarelto likely only thru late february then stop     -   evusheld- given 2/10       Chapo Hill MD      Results for CONNER MCKEON (MRN [id number]) as of 2/11/2022 11:01   Ref. Range 1/27/2022 08:45 2/3/2022 09:48 2/7/2022 09:45 2/7/2022 12:19 2/7/2022 12:20   Sodium Latest Ref Range: 133 - 144 mmol/L 137  135     Potassium Latest Ref Range: 3.4 - 5.3 mmol/L 4.4  4.5     Chloride Latest Ref Range: 94 - 109 mmol/L 106  105     Carbon Dioxide Latest Ref Range: 20 - 32 mmol/L 24  23     Urea Nitrogen Latest Ref Range: 7 - 30 mg/dL 26  26     Creatinine Latest Ref Range: 0.66 - 1.25 mg/dL 1.22  1.32 (H)     GFR Estimate Latest Ref Range: >60 mL/min/1.73m2 67  61     Calcium Latest Ref Range: 8.5 - 10.1 mg/dL 9.6  9.3     Anion Gap Latest Ref Range: 3 - 14 mmol/L 7  7     Albumin Latest Ref Range: 3.4 - 5.0 g/dL 3.8  3.3 (L)     Protein Total Latest Ref Range: 6.8 - 8.8 g/dL 7.4  6.9     Bilirubin Total Latest Ref Range: 0.2 - 1.3 mg/dL 0.6  0.6     Alkaline Phosphatase  Latest Ref Range: 40 - 150 U/L 100  119     ALT Latest Ref Range: 0 - 70 U/L 100 (H)  146 (H)     AST Latest Ref Range: 0 - 45 U/L 98 (H)  156 (H)     Glucose Latest Ref Range: 70 - 99 mg/dL 103 (H)  101 (H)     WBC Latest Ref Range: 4.0 - 11.0 10e3/uL 7.7  7.8     Hemoglobin Latest Ref Range: 13.3 - 17.7 g/dL 13.6  13.4     Hematocrit Latest Ref Range: 40.0 - 53.0 % 40.7  40.8     Platelet Count Latest Ref Range: 150 - 450 10e3/uL 116 (L)  92 (L)     RBC Count Latest Ref Range: 4.40 - 5.90 10e6/uL 4.34 (L)  4.38 (L)     MCV Latest Ref Range: 78 - 100 fL 94  93     MCH Latest Ref Range: 26.5 - 33.0 pg 31.3  30.6     MCHC Latest Ref Range: 31.5 - 36.5 g/dL 33.4  32.8     RDW Latest Ref Range: 10.0 - 15.0 % 14.6  14.9     % Neutrophils Latest Units: % 35  35     % Lymphocytes Latest Units: % 40  36     % Monocytes Latest Units: % 13  14     % Eosinophils Latest Units: % 11  13     % Basophils Latest Units: % 1  1     Absolute Basophils Latest Ref Range: 0.0 - 0.2 10e3/uL 0.1  0.1     Absolute Eosinophils Latest Ref Range: 0.0 - 0.7 10e3/uL 0.9 (H)  1.0 (H)     Absolute Immature Granulocytes Latest Ref Range: <=0.4 10e3/uL 0.0  0.0     Absolute Lymphocytes Latest Ref Range: 0.8 - 5.3 10e3/uL 3.0  2.9     Absolute Monocytes Latest Ref Range: 0.0 - 1.3 10e3/uL 1.0  1.1     % Immature Granulocytes Latest Units: % 0  1     Absolute Neutrophils Latest Ref Range: 1.6 - 8.3 10e3/uL 2.7  2.7     Absolute NRBCs Latest Units: 10e3/uL 0.0  0.0     NRBCs per 100 WBC Latest Ref Range: <1 /100 0  0     INR Latest Ref Range: 0.85 - 1.15    0.96     CMV Quant IU/mL Latest Ref Range: Not Detected IU/mL   Not Detected     SARS CoV2 PCR Latest Ref Range: Negative, Testing sent to reference lab. Results will be returned via unsolicited result   Negative      CMV QUANTITATIVE, PCR Unknown   Rpt     CHROMOSOME ANALYSIS, BONE MARROW, DIAGNOSIS/RELAPSE Unknown     Rpt   DNA MARKER POST BMT ENGRAFTMENT BONE MARROW Unknown     Attch   FISH  Unknown     Rpt   FLOW CYTOMETRY Unknown     Attch   Interpretation Unknown     This result conta...   DISCLAIMER Unknown     This result conta...   BONE MARROW BIOPSY Unknown    Attch      Bone marrow, left posterior iliac crest, decalcified trephine biopsy, touch imprint, direct aspirate smear, concentrated aspirate smear, and peripheral blood smear:                Marrow cellularity of 30%, with trilineage hematopoiesis; no morphologic or immunophenotypic evidence of recurrent/persistent B lymphoblastic leukemia (see comment)                Thrombocytopenia; slightly low RBC count; slight eosinophilia   Electronically signed by Kel Hirsch MD on 2/8/2022 at  1:55 PM   Comment     Concurrent flow cytometry was performed ([id number]), and no abnormal B lymphoblast population was identified.

## 2022-02-16 ENCOUNTER — ONCOLOGY VISIT (OUTPATIENT)
Dept: TRANSPLANT | Facility: CLINIC | Age: 62
End: 2022-02-16
Attending: INTERNAL MEDICINE
Payer: COMMERCIAL

## 2022-02-16 ENCOUNTER — LAB (OUTPATIENT)
Dept: LAB | Facility: CLINIC | Age: 62
End: 2022-02-16
Attending: INTERNAL MEDICINE
Payer: COMMERCIAL

## 2022-02-16 VITALS
HEART RATE: 84 BPM | OXYGEN SATURATION: 98 % | BODY MASS INDEX: 31.05 KG/M2 | RESPIRATION RATE: 16 BRPM | WEIGHT: 235.3 LBS | SYSTOLIC BLOOD PRESSURE: 120 MMHG | DIASTOLIC BLOOD PRESSURE: 78 MMHG | TEMPERATURE: 98.3 F

## 2022-02-16 DIAGNOSIS — F41.8 SITUATIONAL ANXIETY: ICD-10-CM

## 2022-02-16 DIAGNOSIS — C91.01 ACUTE LYMPHOBLASTIC LEUKEMIA (ALL) IN REMISSION (H): Primary | ICD-10-CM

## 2022-02-16 DIAGNOSIS — T86.09 GVHD AS COMPLICATION OF BONE MARROW TRANSPLANT (H): ICD-10-CM

## 2022-02-16 DIAGNOSIS — Z94.81 STATUS POST BONE MARROW TRANSPLANT (H): ICD-10-CM

## 2022-02-16 DIAGNOSIS — C91.01 ACUTE LYMPHOBLASTIC LEUKEMIA (ALL) IN REMISSION (H): ICD-10-CM

## 2022-02-16 DIAGNOSIS — Z94.81 STATUS POST BONE MARROW TRANSPLANT (H): Primary | ICD-10-CM

## 2022-02-16 DIAGNOSIS — D89.813 GVHD AS COMPLICATION OF BONE MARROW TRANSPLANT (H): ICD-10-CM

## 2022-02-16 DIAGNOSIS — Z11.59 ENCOUNTER FOR SCREENING FOR OTHER VIRAL DISEASES: Primary | ICD-10-CM

## 2022-02-16 LAB
ALBUMIN SERPL-MCNC: 3.2 G/DL (ref 3.4–5)
ALP SERPL-CCNC: 146 U/L (ref 40–150)
ALT SERPL W P-5'-P-CCNC: 270 U/L (ref 0–70)
ANION GAP SERPL CALCULATED.3IONS-SCNC: 7 MMOL/L (ref 3–14)
AST SERPL W P-5'-P-CCNC: 267 U/L (ref 0–45)
BASOPHILS # BLD AUTO: 0.1 10E3/UL (ref 0–0.2)
BASOPHILS NFR BLD AUTO: 1 %
BILIRUB SERPL-MCNC: 0.6 MG/DL (ref 0.2–1.3)
BUN SERPL-MCNC: 30 MG/DL (ref 7–30)
CALCIUM SERPL-MCNC: 9.3 MG/DL (ref 8.5–10.1)
CHLORIDE BLD-SCNC: 106 MMOL/L (ref 94–109)
CMV DNA SPEC NAA+PROBE-ACNC: NOT DETECTED IU/ML
CO2 SERPL-SCNC: 22 MMOL/L (ref 20–32)
CREAT SERPL-MCNC: 1.21 MG/DL (ref 0.66–1.25)
EOSINOPHIL # BLD AUTO: 1.8 10E3/UL (ref 0–0.7)
EOSINOPHIL NFR BLD AUTO: 20 %
ERYTHROCYTE [DISTWIDTH] IN BLOOD BY AUTOMATED COUNT: 15.6 % (ref 10–15)
GFR SERPL CREATININE-BSD FRML MDRD: 67 ML/MIN/1.73M2
GLUCOSE BLD-MCNC: 109 MG/DL (ref 70–99)
HCT VFR BLD AUTO: 41.6 % (ref 40–53)
HGB BLD-MCNC: 13.9 G/DL (ref 13.3–17.7)
IMM GRANULOCYTES # BLD: 0.1 10E3/UL
IMM GRANULOCYTES NFR BLD: 1 %
LAB DIRECTOR COMMENTS: NORMAL
LAB DIRECTOR DISCLAIMER: NORMAL
LAB DIRECTOR INTERPRETATION: NORMAL
LAB DIRECTOR METHODOLOGY: NORMAL
LAB DIRECTOR RESULTS: NORMAL
LYMPHOCYTES # BLD AUTO: 2.6 10E3/UL (ref 0.8–5.3)
LYMPHOCYTES NFR BLD AUTO: 29 %
MAGNESIUM SERPL-MCNC: 1.9 MG/DL (ref 1.6–2.3)
MCH RBC QN AUTO: 31.3 PG (ref 26.5–33)
MCHC RBC AUTO-ENTMCNC: 33.4 G/DL (ref 31.5–36.5)
MCV RBC AUTO: 94 FL (ref 78–100)
MONOCYTES # BLD AUTO: 1.1 10E3/UL (ref 0–1.3)
MONOCYTES NFR BLD AUTO: 13 %
NEUTROPHILS # BLD AUTO: 3.3 10E3/UL (ref 1.6–8.3)
NEUTROPHILS NFR BLD AUTO: 36 %
NRBC # BLD AUTO: 0 10E3/UL
NRBC BLD AUTO-RTO: 0 /100
PLATELET # BLD AUTO: 92 10E3/UL (ref 150–450)
POTASSIUM BLD-SCNC: 4.7 MMOL/L (ref 3.4–5.3)
PROT SERPL-MCNC: 7.1 G/DL (ref 6.8–8.8)
RBC # BLD AUTO: 4.44 10E6/UL (ref 4.4–5.9)
SODIUM SERPL-SCNC: 135 MMOL/L (ref 133–144)
SPECIMEN DESCRIPTION: NORMAL
WBC # BLD AUTO: 8.9 10E3/UL (ref 4–11)

## 2022-02-16 PROCEDURE — 80053 COMPREHEN METABOLIC PANEL: CPT

## 2022-02-16 PROCEDURE — 250N000011 HC RX IP 250 OP 636: Performed by: INTERNAL MEDICINE

## 2022-02-16 PROCEDURE — 81206 BCR/ABL1 GENE MAJOR BP: CPT

## 2022-02-16 PROCEDURE — G0452 MOLECULAR PATHOLOGY INTERPR: HCPCS | Mod: 26 | Performed by: PATHOLOGY

## 2022-02-16 PROCEDURE — 83735 ASSAY OF MAGNESIUM: CPT

## 2022-02-16 PROCEDURE — G0463 HOSPITAL OUTPT CLINIC VISIT: HCPCS | Mod: 25

## 2022-02-16 PROCEDURE — 99214 OFFICE O/P EST MOD 30 MIN: CPT

## 2022-02-16 PROCEDURE — 85025 COMPLETE CBC W/AUTO DIFF WBC: CPT

## 2022-02-16 PROCEDURE — 36591 DRAW BLOOD OFF VENOUS DEVICE: CPT

## 2022-02-16 RX ORDER — TRIAMCINOLONE ACETONIDE 1 MG/G
OINTMENT TOPICAL 2 TIMES DAILY
Qty: 453 G | Refills: 1 | Status: SHIPPED | OUTPATIENT
Start: 2022-02-16 | End: 2022-04-06

## 2022-02-16 RX ORDER — ESCITALOPRAM OXALATE 10 MG/1
10 TABLET ORAL DAILY
Qty: 30 TABLET | Refills: 0 | Status: SHIPPED | OUTPATIENT
Start: 2022-02-16 | End: 2022-03-16

## 2022-02-16 RX ORDER — DEXAMETHASONE 0.5 MG/5ML
2 SOLUTION ORAL 4 TIMES DAILY
Qty: 1000 ML | Refills: 3 | Status: SHIPPED | OUTPATIENT
Start: 2022-02-16 | End: 2022-03-09

## 2022-02-16 RX ORDER — HEPARIN SODIUM (PORCINE) LOCK FLUSH IV SOLN 100 UNIT/ML 100 UNIT/ML
5 SOLUTION INTRAVENOUS ONCE
Status: COMPLETED | OUTPATIENT
Start: 2022-02-16 | End: 2022-02-16

## 2022-02-16 RX ORDER — LORAZEPAM 1 MG/1
1 TABLET ORAL
Qty: 30 TABLET | Refills: 0 | Status: SHIPPED | OUTPATIENT
Start: 2022-02-16 | End: 2022-03-21

## 2022-02-16 RX ADMIN — SODIUM CHLORIDE, PRESERVATIVE FREE 5 ML: 5 INJECTION INTRAVENOUS at 09:56

## 2022-02-16 ASSESSMENT — PAIN SCALES - GENERAL: PAINLEVEL: NO PAIN (0)

## 2022-02-16 NOTE — NURSING NOTE
"Oncology Rooming Note    February 16, 2022 10:04 AM   Nik Nava is a 61 year old male who presents for:    Chief Complaint   Patient presents with     Blood Draw     Labs obtained via noncoring powerport 20 gauge 3/4 inch needle, heparin flushed, VS taken, checked into next appt     Oncology Clinic Visit     ALL     Initial Vitals: /78   Pulse 84   Temp 98.3  F (36.8  C)   Resp 16   Wt 106.7 kg (235 lb 4.8 oz)   SpO2 98%   BMI 31.05 kg/m   Estimated body mass index is 31.05 kg/m  as calculated from the following:    Height as of 2/10/22: 1.854 m (6' 0.99\").    Weight as of this encounter: 106.7 kg (235 lb 4.8 oz). Body surface area is 2.34 meters squared.  No Pain (0) Comment: Data Unavailable   No LMP for male patient.  Allergies reviewed: Yes  Medications reviewed: Yes    Medications: MEDICATION REFILLS NEEDED TODAY. Provider was notified.   KENALOG CREAM    Pharmacy name entered into [employer]:    [facility] - [place], [place] - [address] [facility] [place], [place] - [id number]    Clinical concerns: none       Praveen Scales            "

## 2022-02-16 NOTE — PROGRESS NOTES
"    Outpatient IR Biopsy Referral  02/16/22     Referring Provider:  Alba Wisdom PA-C  Biopsy Requested:  IR random liver biopsy   Procedure Approved:  US guided liver biopsy with sedation    Patient is a 61 year old with history of acute lymphoblastic leukemia now requested for liver biopsy with elevated LFTs due to concern for GVHD.      Recommend US first but would proceed given urgency as stated by team    Pertinent Medications Reviewed:  On Xarelto would need 48 hour hold.   Pertinent Imaging Reviewed: No relevant recent imaging.      Procedure order and surgical pathology order placed.    If requesting team would like sample sent for anything else please use the IR order set \"IR RAD Biopsy or Fluid Aspirate Specimens\" to select your necessary diagnostic labs and pend for admission.     If there are labs you desire that are not found in this order set or you have questions regarding specific diagnostic labs please call the associated lab personnel. IR will not enter diagnostic labs on the day of the procedure.     Primary team Alba Wisdom PA-C and Olivia NUNEZ made aware of IR recommendations.    Mary Felix PA-C  Interventional Radiology   IR on-call pager: [phone]                     "

## 2022-02-16 NOTE — PROGRESS NOTES
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 Thinks lips are better with protopic, but no improvement in mouth. Rash appears somewhat better/more brown to him and wife. Not symptomatic aside from pubic area-- has some discomfort to skin there when sitting for long time.     Review of Systems                                                                                                                                         8 point Review of systems was o/w negative     PHYSICAL EXAM                                                                                                                                                 KPS: 90  Blood pressure 120/78, pulse 84, temperature 98.3  F (36.8  C), resp. rate 16, weight 106.7 kg (235 lb 4.8 oz), SpO2 98 %.    Wt Readings from Last 4 Encounters:   02/16/22 106.7 kg (235 lb 4.8 oz)   02/10/22 105.5 kg (232 lb 9.6 oz)   02/07/22 102.1 kg (225 lb)   02/07/22 105.1 kg (231 lb 12.8 oz)      Lab Results   Component Value Date    WBC 8.9 02/16/2022    ANEU 3.5 10/26/2021    HGB 13.9 02/16/2022    HCT 41.6 02/16/2022    PLT 92 (L) 02/16/2022     02/07/2022    POTASSIUM 4.5 02/07/2022    CHLORIDE 105 02/07/2022    CO2 23 02/07/2022     (H) 02/07/2022    BUN 26 02/07/2022    CR 1.32 (H) 02/07/2022    MAG 1.8 01/04/2022    INR 0.96 02/07/2022    BILITOTAL 0.6 02/07/2022     (H) 02/07/2022     (H) 02/07/2022    ALKPHOS 119 02/07/2022    PROTTOTAL 6.9 02/07/2022    ALBUMIN 3.3 (L) 02/07/2022       General: NAD  HEENT: sclera anicteric. Sl conj erythema and tearing   No oral ulcers or erythema    Lungs:  CTA b/l   no wheezes  CV: RRR  no gallop  Ext/ Skin: faint macular erythema over abdomen- centrally more brown/gerard color lateral chest/torso  sl  faintly erythematous lesion marginally rashed slightly erythematous lesions scattered on arms but  fading since steroid creams started. 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 S/p allo sib PBSCT. (ABO matched)  HCT-CI score: 3 (prior solid tumor)  Day +100 bone marrow biopsy is 100% donor with no morphologic or flow cytometric evidence of leukemia BCR abl is pending.    - Day +180 restaging BM bx- still in CR  100% donor;  BCR Abl was missed- 2/16 pending from serum.       2.  HEME:   - Transfuse for hgb <7g/dL; plt < 10k.  No transfusions needs  -  pulmonary emboli: He developed a pulmonary emboli in the setting of receiving PEG asparaginase.   On Xarelto but because this was a provoked thrombus when the next phase of his treatment is settled; this can be discontinued.   Continue for anticoagulation for now.  Likely stop by end of February.- continue for now.       3.  FEN/Renal: Oral intake good/stable  - Cr stable  - Tac induced hypomag, Off now but follow Mag  - He has a history of renal cancer and resection. has a single kidney.    4.    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     - Continue hydrocortisone cream to face  - Continue tac ointment to body rash daily on front abdomen daily (looks better 2/16 - more gerard/healing color) continue BID to back and suprapubic area which reamins erythematous   - New chronic GVHD symptoms mouth and eyes. Injected, watering eyes  -2/10  saw optho: mild GVHD of eyes- recommend Maxitrol to lids, continue refreshing drops QID.   - 2/7 Mouth: lichnoid changes on soft palpate and bilateral buccal mucosa.  Continue dex swish and spit TID,  Using tac ointment to lips BID.   -2/16 Transaminitis worsening - Discussed with Dr Hill- order IR guided liver bx. Hopeful for bx early next week. Will send hepatitis work up next week as well.     Could be limited cGVHD but eyes mild; mouth minimal and rash seems somewhat better with topical TMC (will extend it to suprapubic region) and HC to face.  Will cont on tac without further taper. Once GVHD rash resolves, mouth improved will try tac taper again If worsens will consider systemic treatmetn.     5.  ID: Afebrile  - prophy acv (letrmovir stopped at day +100),  - Schedule inhaled pentamidine 2/23. (it was due 2/10).   - CMV neg 1/4 and 2/7    - influenza vacc given 10/26.  Covid vaccine given November 18- given second dose 12/14     Pt given info on [facility]--  6. Hx of graves disease; On synthroid 175 mcg daily     7. CV: Norvasc 5mg daily. BP stable today,    8. GI: omeprazole for heartburn    9. Psych: more anxiety - harder to manage as limited activity (would typically kind of work off stem but cant do that with weather/restrictions post transplant  - Situational anxiety - start lexapro 10mg daily. Also difficult sleeping refilled ativan 1mg at bedtime prn  As halcion was not helpful.  Discussed would not recommend ativan for sleep long term. Hopeful lexapro will help with anxiety and therefore sleep.   - if not consider true sleep aid. Unisom? Trazadone was not effective.     Plan:   - stopclotrimazole cream to pubic area   Use TMC cream once daily  - Resume TMC cream to body rash BID  and will try TMC ointment as well to see if it helps dry skin itching  - continue dex swish and spit to mouth  - Protopic to lips BID  - Continue tac to 1mg qAm and 0.5mg wHS -- Plan to continue for several weeks until all  GVHD symptoms resolve (rash resolves) before resuming tac taper.   - IR liver bx ASAP (will hold xeralto for 48hrs pre procedure. No plan to resume given he would stop the following week anyway.       Call with worsening GVHD.  Nuvia Wisdom PA-C  [phone]

## 2022-02-16 NOTE — LETTER
2/16/2022         RE: Nik Nava  [id number] [facility] [id number]        Dear Colleague,    Thank you for referring your patient, Nik Nava, to the [facility] [place]. Please see a copy of my visit note below.    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 Thinks lips are better with protopic, but no improvement in mouth. Rash appears somewhat better/more brown to him and wife. Not symptomatic aside from pubic area-- has some discomfort to skin there when sitting for long time.     Review of Systems                                                                                                                                         8 point Review of systems was o/w negative     PHYSICAL EXAM                                                                                                                                                 KPS: 90  Blood pressure 120/78, pulse 84, temperature 98.3  F (36.8  C), resp. rate 16, weight 106.7 kg (235 lb 4.8 oz), SpO2 98 %.    Wt Readings from Last 4 Encounters:   02/16/22 106.7 kg (235 lb 4.8 oz)   02/10/22 105.5 kg (232 lb 9.6 oz)   02/07/22 102.1 kg (225 lb)   02/07/22 105.1 kg (231 lb 12.8 oz)      Lab Results   Component Value Date    WBC 8.9 02/16/2022    ANEU 3.5 10/26/2021    HGB 13.9 02/16/2022    HCT 41.6 02/16/2022    PLT 92 (L) 02/16/2022     02/07/2022    POTASSIUM 4.5 02/07/2022    CHLORIDE 105 02/07/2022    CO2 23 02/07/2022     (H) 02/07/2022    BUN 26 02/07/2022    CR 1.32 (H) 02/07/2022    MAG 1.8 01/04/2022    INR 0.96 02/07/2022    BILITOTAL 0.6 02/07/2022     (H) 02/07/2022     (H) 02/07/2022    ALKPHOS 119 02/07/2022    PROTTOTAL 6.9 02/07/2022    ALBUMIN 3.3 (L) 02/07/2022       General: NAD  HEENT: sclera anicteric. Sl conj erythema and tearing    No oral ulcers or erythema    Lungs:  CTA b/l   no wheezes  CV: RRR  no gallop  Ext/ Skin: faint macular erythema over abdomen- centrally more brown/gerard color lateral chest/torso  sl  faintly erythematous lesion marginally rashed slightly erythematous lesions scattered on arms but fading since steroid creams started. 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 S/p allo sib PBSCT. (ABO matched)  HCT-CI score: 3 (prior solid tumor)  Day +100 bone marrow biopsy is 100% donor with no morphologic or flow cytometric evidence of leukemia BCR abl is pending.    - Day +180 restaging BM bx- still in CR  100% donor;  BCR Abl was missed- 2/16 pending from serum.       2.  HEME:   - Transfuse for hgb <7g/dL; plt < 10k.  No transfusions needs  -  pulmonary emboli: He developed a pulmonary emboli in the setting of receiving PEG asparaginase.   On Xarelto but because this was a provoked thrombus when the next phase of his treatment is settled; this can be discontinued.   Continue for anticoagulation for now.  Likely stop by end of February.- continue for now.       3.  FEN/Renal: Oral intake good/stable  - Cr stable  - Tac induced hypomag, Off now but follow Mag  - He has a history of renal cancer and resection. has a single kidney.    4.    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     - Continue  hydrocortisone cream to face  - Continue tac ointment to body rash daily on front abdomen daily (looks better 2/16 - more gerard/healing color) continue BID to back and suprapubic area which reamins erythematous   - New chronic GVHD symptoms mouth and eyes. Injected, watering eyes  -2/10 saw optho: mild GVHD of eyes- recommend Maxitrol to lids, continue refreshing drops QID.   - 2/7 Mouth: lichnoid changes on soft palpate and bilateral buccal mucosa.  Continue dex swish and spit TID,  Using tac ointment to lips BID.   -2/16 Transaminitis worsening - Discussed with Dr Hill- order IR guided liver bx. Hopeful for bx early next week. Will send hepatitis work up next week as well.     Could be limited cGVHD but eyes mild; mouth minimal and rash seems somewhat better with topical TMC (will extend it to suprapubic region) and HC to face.  Will cont on tac without further taper. Once GVHD rash resolves, mouth improved will try tac taper again If worsens will consider systemic treatmetn.     5.  ID: Afebrile  - prophy acv (letrmovir stopped at day +100),  - Schedule inhaled pentamidine 2/23. (it was due 2/10).   - CMV neg 1/4 and 2/7    - influenza vacc given 10/26.  Covid vaccine given November 18- given second dose 12/14     Pt given info on [facility]--  6. Hx of graves disease; On synthroid 175 mcg daily     7. CV: Norvasc 5mg daily. BP stable today,    8. GI: omeprazole for heartburn    9. Psych: more anxiety - harder to manage as limited activity (would typically kind of work off stem but cant do that with weather/restrictions post transplant  - Situational anxiety - start lexapro 10mg daily. Also difficult sleeping refilled ativan 1mg at bedtime prn  As halcion was not helpful.  Discussed would not recommend ativan for sleep long term. Hopeful lexapro will help with anxiety and therefore sleep.   - if not consider true sleep aid. Unisom? Trazadone was not effective.     Plan:   - stopclotrimazole cream to pubic area    Use TMC cream once daily  - Resume TMC cream to body rash BID  and will try TMC ointment as well to see if it helps dry skin itching  - continue dex swish and spit to mouth  - Protopic to lips BID  - Continue tac to 1mg qAm and 0.5mg wHS -- Plan to continue for several weeks until all GVHD symptoms resolve (rash resolves) before resuming tac taper.   - IR liver bx ASAP (will hold xeralto for 48hrs pre procedure. No plan to resume given he would stop the following week anyway.     Call with worsening GVHD.  Nuvia Wisdom PA-C  [phone]          Again, thank you for allowing me to participate in the care of your patient.      Sincerely,    BMT Advanced Practice Provider

## 2022-02-16 NOTE — NURSING NOTE
Chief Complaint   Patient presents with     Blood Draw     Labs obtained via noncoring powerport 20 gauge 3/4 inch needle, heparin flushed, VS taken, checked into next appt

## 2022-02-18 ENCOUNTER — LAB (OUTPATIENT)
Dept: LAB | Facility: CLINIC | Age: 62
End: 2022-02-18
Attending: RADIOLOGY
Payer: COMMERCIAL

## 2022-02-18 ENCOUNTER — ANESTHESIA EVENT (OUTPATIENT)
Dept: SURGERY | Facility: AMBULATORY SURGERY CENTER | Age: 62
End: 2022-02-18
Payer: COMMERCIAL

## 2022-02-18 DIAGNOSIS — Z11.59 ENCOUNTER FOR SCREENING FOR OTHER VIRAL DISEASES: ICD-10-CM

## 2022-02-18 LAB
CULTURE HARVEST COMPLETE DATE: NORMAL
INTERPRETATION: NORMAL

## 2022-02-18 PROCEDURE — U0003 INFECTIOUS AGENT DETECTION BY NUCLEIC ACID (DNA OR RNA); SEVERE ACUTE RESPIRATORY SYNDROME CORONAVIRUS 2 (SARS-COV-2) (CORONAVIRUS DISEASE [COVID-19]), AMPLIFIED PROBE TECHNIQUE, MAKING USE OF HIGH THROUGHPUT TECHNOLOGIES AS DESCRIBED BY CMS-2020-01-R: HCPCS

## 2022-02-18 PROCEDURE — U0005 INFEC AGEN DETEC AMPLI PROBE: HCPCS

## 2022-02-19 LAB — SARS-COV-2 RNA RESP QL NAA+PROBE: NEGATIVE

## 2022-02-21 ENCOUNTER — ANCILLARY PROCEDURE (OUTPATIENT)
Dept: RADIOLOGY | Facility: AMBULATORY SURGERY CENTER | Age: 62
End: 2022-02-21
Attending: PHYSICIAN ASSISTANT
Payer: COMMERCIAL

## 2022-02-21 ENCOUNTER — APPOINTMENT (OUTPATIENT)
Dept: LAB | Facility: CLINIC | Age: 62
End: 2022-02-21
Attending: STUDENT IN AN ORGANIZED HEALTH CARE EDUCATION/TRAINING PROGRAM
Payer: COMMERCIAL

## 2022-02-21 ENCOUNTER — HOSPITAL ENCOUNTER (OUTPATIENT)
Facility: AMBULATORY SURGERY CENTER | Age: 62
End: 2022-02-21
Attending: RADIOLOGY
Payer: COMMERCIAL

## 2022-02-21 ENCOUNTER — ONCOLOGY VISIT (OUTPATIENT)
Dept: TRANSPLANT | Facility: CLINIC | Age: 62
End: 2022-02-21
Attending: STUDENT IN AN ORGANIZED HEALTH CARE EDUCATION/TRAINING PROGRAM
Payer: COMMERCIAL

## 2022-02-21 ENCOUNTER — ANESTHESIA (OUTPATIENT)
Dept: SURGERY | Facility: AMBULATORY SURGERY CENTER | Age: 62
End: 2022-02-21
Payer: COMMERCIAL

## 2022-02-21 VITALS
HEART RATE: 83 BPM | SYSTOLIC BLOOD PRESSURE: 105 MMHG | DIASTOLIC BLOOD PRESSURE: 65 MMHG | BODY MASS INDEX: 31.24 KG/M2 | TEMPERATURE: 97.6 F | RESPIRATION RATE: 16 BRPM | OXYGEN SATURATION: 97 % | WEIGHT: 235.7 LBS | HEIGHT: 73 IN

## 2022-02-21 VITALS
BODY MASS INDEX: 31.18 KG/M2 | HEIGHT: 73 IN | DIASTOLIC BLOOD PRESSURE: 60 MMHG | SYSTOLIC BLOOD PRESSURE: 102 MMHG | RESPIRATION RATE: 18 BRPM | TEMPERATURE: 98.5 F | HEART RATE: 57 BPM | OXYGEN SATURATION: 98 % | WEIGHT: 235.3 LBS

## 2022-02-21 DIAGNOSIS — Z94.81 STATUS POST BONE MARROW TRANSPLANT (H): ICD-10-CM

## 2022-02-21 DIAGNOSIS — C91.01 ACUTE LYMPHOBLASTIC LEUKEMIA (ALL) IN REMISSION (H): ICD-10-CM

## 2022-02-21 LAB
ALBUMIN SERPL-MCNC: 3.1 G/DL (ref 3.4–5)
ALP SERPL-CCNC: 151 U/L (ref 40–150)
ALT SERPL W P-5'-P-CCNC: 339 U/L (ref 0–70)
ANION GAP SERPL CALCULATED.3IONS-SCNC: 7 MMOL/L (ref 3–14)
AST SERPL W P-5'-P-CCNC: 310 U/L (ref 0–45)
BASOPHILS # BLD AUTO: 0.1 10E3/UL (ref 0–0.2)
BASOPHILS NFR BLD AUTO: 1 %
BILIRUB SERPL-MCNC: 0.6 MG/DL (ref 0.2–1.3)
BUN SERPL-MCNC: 29 MG/DL (ref 7–30)
CALCIUM SERPL-MCNC: 9 MG/DL (ref 8.5–10.1)
CHLORIDE BLD-SCNC: 107 MMOL/L (ref 94–109)
CO2 SERPL-SCNC: 24 MMOL/L (ref 20–32)
CREAT SERPL-MCNC: 1.21 MG/DL (ref 0.66–1.25)
EOSINOPHIL # BLD AUTO: 0.7 10E3/UL (ref 0–0.7)
EOSINOPHIL NFR BLD AUTO: 11 %
ERYTHROCYTE [DISTWIDTH] IN BLOOD BY AUTOMATED COUNT: 16.4 % (ref 10–15)
GFR SERPL CREATININE-BSD FRML MDRD: 67 ML/MIN/1.73M2
GLUCOSE BLD-MCNC: 142 MG/DL (ref 70–99)
HAV IGM SERPL QL IA: NONREACTIVE
HBV CORE AB SERPL QL IA: NONREACTIVE
HBV SURFACE AG SERPL QL IA: NONREACTIVE
HCT VFR BLD AUTO: 41.7 % (ref 40–53)
HCV AB SERPL QL IA: NONREACTIVE
HGB BLD-MCNC: 13.7 G/DL (ref 13.3–17.7)
IMM GRANULOCYTES # BLD: 0 10E3/UL
IMM GRANULOCYTES NFR BLD: 1 %
INTERPRETATION: NORMAL
LYMPHOCYTES # BLD AUTO: 2.4 10E3/UL (ref 0.8–5.3)
LYMPHOCYTES NFR BLD AUTO: 37 %
MAGNESIUM SERPL-MCNC: 2 MG/DL (ref 1.6–2.3)
MCH RBC QN AUTO: 30.8 PG (ref 26.5–33)
MCHC RBC AUTO-ENTMCNC: 32.9 G/DL (ref 31.5–36.5)
MCV RBC AUTO: 94 FL (ref 78–100)
MONOCYTES # BLD AUTO: 0.5 10E3/UL (ref 0–1.3)
MONOCYTES NFR BLD AUTO: 8 %
NEUTROPHILS # BLD AUTO: 2.8 10E3/UL (ref 1.6–8.3)
NEUTROPHILS NFR BLD AUTO: 42 %
NRBC # BLD AUTO: 0 10E3/UL
NRBC BLD AUTO-RTO: 0 /100
PLATELET # BLD AUTO: 83 10E3/UL (ref 150–450)
POTASSIUM BLD-SCNC: 3.9 MMOL/L (ref 3.4–5.3)
PROT SERPL-MCNC: 6.8 G/DL (ref 6.8–8.8)
RBC # BLD AUTO: 4.45 10E6/UL (ref 4.4–5.9)
SODIUM SERPL-SCNC: 138 MMOL/L (ref 133–144)
WBC # BLD AUTO: 6.4 10E3/UL (ref 4–11)

## 2022-02-21 PROCEDURE — 47000 NEEDLE BIOPSY OF LIVER PERQ: CPT | Performed by: RADIOLOGY

## 2022-02-21 PROCEDURE — 99214 OFFICE O/P EST MOD 30 MIN: CPT | Mod: 25

## 2022-02-21 PROCEDURE — 88313 SPECIAL STAINS GROUP 2: CPT | Mod: TC,XU | Performed by: RADIOLOGY

## 2022-02-21 PROCEDURE — 86709 HEPATITIS A IGM ANTIBODY: CPT

## 2022-02-21 PROCEDURE — 88341 IMHCHEM/IMCYTCHM EA ADD ANTB: CPT | Mod: 26 | Performed by: PATHOLOGY

## 2022-02-21 PROCEDURE — 88313 SPECIAL STAINS GROUP 2: CPT | Mod: 26 | Performed by: PATHOLOGY

## 2022-02-21 PROCEDURE — 85004 AUTOMATED DIFF WBC COUNT: CPT

## 2022-02-21 PROCEDURE — 36591 DRAW BLOOD OFF VENOUS DEVICE: CPT

## 2022-02-21 PROCEDURE — 83735 ASSAY OF MAGNESIUM: CPT

## 2022-02-21 PROCEDURE — 88307 TISSUE EXAM BY PATHOLOGIST: CPT | Mod: 26 | Performed by: PATHOLOGY

## 2022-02-21 PROCEDURE — 88312 SPECIAL STAINS GROUP 1: CPT | Mod: 26 | Performed by: PATHOLOGY

## 2022-02-21 PROCEDURE — 88342 IMHCHEM/IMCYTCHM 1ST ANTB: CPT | Mod: TC | Performed by: RADIOLOGY

## 2022-02-21 PROCEDURE — 47000 NEEDLE BIOPSY OF LIVER PERQ: CPT

## 2022-02-21 PROCEDURE — 86704 HEP B CORE ANTIBODY TOTAL: CPT

## 2022-02-21 PROCEDURE — 86803 HEPATITIS C AB TEST: CPT

## 2022-02-21 PROCEDURE — 87799 DETECT AGENT NOS DNA QUANT: CPT

## 2022-02-21 PROCEDURE — G0463 HOSPITAL OUTPT CLINIC VISIT: HCPCS | Mod: 25

## 2022-02-21 PROCEDURE — 76942 ECHO GUIDE FOR BIOPSY: CPT | Mod: 26 | Performed by: RADIOLOGY

## 2022-02-21 PROCEDURE — 250N000011 HC RX IP 250 OP 636: Performed by: STUDENT IN AN ORGANIZED HEALTH CARE EDUCATION/TRAINING PROGRAM

## 2022-02-21 PROCEDURE — 88342 IMHCHEM/IMCYTCHM 1ST ANTB: CPT | Mod: 26 | Performed by: PATHOLOGY

## 2022-02-21 PROCEDURE — 80053 COMPREHEN METABOLIC PANEL: CPT

## 2022-02-21 PROCEDURE — 87799 DETECT AGENT NOS DNA QUANT: CPT | Mod: XU

## 2022-02-21 PROCEDURE — 87340 HEPATITIS B SURFACE AG IA: CPT

## 2022-02-21 RX ORDER — PROPOFOL 10 MG/ML
INJECTION, EMULSION INTRAVENOUS PRN
Status: DISCONTINUED | OUTPATIENT
Start: 2022-02-21 | End: 2022-02-21

## 2022-02-21 RX ORDER — ONDANSETRON 2 MG/ML
4 INJECTION INTRAMUSCULAR; INTRAVENOUS EVERY 30 MIN PRN
Status: DISCONTINUED | OUTPATIENT
Start: 2022-02-21 | End: 2022-02-22 | Stop reason: HOSPADM

## 2022-02-21 RX ORDER — PROPOFOL 10 MG/ML
INJECTION, EMULSION INTRAVENOUS CONTINUOUS PRN
Status: DISCONTINUED | OUTPATIENT
Start: 2022-02-21 | End: 2022-02-21

## 2022-02-21 RX ORDER — HEPARIN SODIUM,PORCINE 10 UNIT/ML
3 VIAL (ML) INTRAVENOUS
Status: DISCONTINUED | OUTPATIENT
Start: 2022-02-21 | End: 2022-02-22 | Stop reason: HOSPADM

## 2022-02-21 RX ORDER — SODIUM CHLORIDE, SODIUM LACTATE, POTASSIUM CHLORIDE, CALCIUM CHLORIDE 600; 310; 30; 20 MG/100ML; MG/100ML; MG/100ML; MG/100ML
INJECTION, SOLUTION INTRAVENOUS CONTINUOUS PRN
Status: DISCONTINUED | OUTPATIENT
Start: 2022-02-21 | End: 2022-02-21

## 2022-02-21 RX ORDER — FENTANYL CITRATE 50 UG/ML
25 INJECTION, SOLUTION INTRAMUSCULAR; INTRAVENOUS
Status: DISCONTINUED | OUTPATIENT
Start: 2022-02-21 | End: 2022-02-22 | Stop reason: HOSPADM

## 2022-02-21 RX ORDER — FENTANYL CITRATE 50 UG/ML
25 INJECTION, SOLUTION INTRAMUSCULAR; INTRAVENOUS EVERY 5 MIN PRN
Status: DISCONTINUED | OUTPATIENT
Start: 2022-02-21 | End: 2022-02-22 | Stop reason: HOSPADM

## 2022-02-21 RX ORDER — LIDOCAINE HYDROCHLORIDE 10 MG/ML
INJECTION, SOLUTION EPIDURAL; INFILTRATION; INTRACAUDAL; PERINEURAL PRN
Status: DISCONTINUED | OUTPATIENT
Start: 2022-02-21 | End: 2022-02-21 | Stop reason: HOSPADM

## 2022-02-21 RX ORDER — ONDANSETRON 4 MG/1
4 TABLET, ORALLY DISINTEGRATING ORAL EVERY 30 MIN PRN
Status: DISCONTINUED | OUTPATIENT
Start: 2022-02-21 | End: 2022-02-22 | Stop reason: HOSPADM

## 2022-02-21 RX ORDER — HEPARIN SODIUM (PORCINE) LOCK FLUSH IV SOLN 100 UNIT/ML 100 UNIT/ML
5 SOLUTION INTRAVENOUS DAILY PRN
Status: DISCONTINUED | OUTPATIENT
Start: 2022-02-21 | End: 2022-02-21 | Stop reason: HOSPADM

## 2022-02-21 RX ORDER — SODIUM CHLORIDE, SODIUM LACTATE, POTASSIUM CHLORIDE, CALCIUM CHLORIDE 600; 310; 30; 20 MG/100ML; MG/100ML; MG/100ML; MG/100ML
INJECTION, SOLUTION INTRAVENOUS CONTINUOUS
Status: DISCONTINUED | OUTPATIENT
Start: 2022-02-21 | End: 2022-02-22 | Stop reason: HOSPADM

## 2022-02-21 RX ORDER — LIDOCAINE 40 MG/G
CREAM TOPICAL
Status: DISCONTINUED | OUTPATIENT
Start: 2022-02-21 | End: 2022-02-22 | Stop reason: HOSPADM

## 2022-02-21 RX ORDER — ACETAMINOPHEN 325 MG/1
975 TABLET ORAL ONCE
Status: DISCONTINUED | OUTPATIENT
Start: 2022-02-21 | End: 2022-02-22 | Stop reason: HOSPADM

## 2022-02-21 RX ADMIN — Medication 5 ML: at 12:17

## 2022-02-21 RX ADMIN — SODIUM CHLORIDE, SODIUM LACTATE, POTASSIUM CHLORIDE, CALCIUM CHLORIDE: 600; 310; 30; 20 INJECTION, SOLUTION INTRAVENOUS at 08:00

## 2022-02-21 RX ADMIN — PROPOFOL 200 MCG/KG/MIN: 10 INJECTION, EMULSION INTRAVENOUS at 09:00

## 2022-02-21 RX ADMIN — PROPOFOL 50 MG: 10 INJECTION, EMULSION INTRAVENOUS at 09:00

## 2022-02-21 RX ADMIN — Medication 3 ML: at 10:18

## 2022-02-21 ASSESSMENT — PAIN SCALES - GENERAL: PAINLEVEL: NO PAIN (0)

## 2022-02-21 ASSESSMENT — LIFESTYLE VARIABLES: TOBACCO_USE: 1

## 2022-02-21 NOTE — PROGRESS NOTES
"BMT Clinic Note    ID:  Nik Nava is a 62 yo man D+195 s/p NMA allo sib PBSCT for Ph+ ALL    HPI: Continued area of skin improvement, nearly all areas have turned to more brown and no new area of red or bumps. Eyes still dry, uses ointment and drops as directed. No eye pain. No n/v/d. Liver biopsy done this morning.    Review of Systems                                                                                                                                         8 point Review of systems was o/w negative     PHYSICAL EXAM                                                                                                                                                 KPS: 90  Blood pressure 105/65, pulse 83, temperature 97.6  F (36.4  C), temperature source Oral, resp. rate 16, height 1.854 m (6' 0.99\"), weight 106.9 kg (235 lb 11.2 oz), SpO2 97 %.    Wt Readings from Last 4 Encounters:   02/21/22 106.9 kg (235 lb 11.2 oz)   02/21/22 106.7 kg (235 lb 4.8 oz)   02/16/22 106.7 kg (235 lb 4.8 oz)   02/10/22 105.5 kg (232 lb 9.6 oz)      Lab Results   Component Value Date    WBC 6.4 02/21/2022    ANEU 3.5 10/26/2021    HGB 13.7 02/21/2022    HCT 41.7 02/21/2022    PLT 83 (L) 02/21/2022     02/21/2022    POTASSIUM 3.9 02/21/2022    CHLORIDE 107 02/21/2022    CO2 24 02/21/2022     (H) 02/21/2022    BUN 29 02/21/2022    CR 1.21 02/21/2022    MAG 2.0 02/21/2022    INR 0.96 02/07/2022    BILITOTAL 0.6 02/21/2022     (H) 02/21/2022     (H) 02/21/2022    ALKPHOS 151 (H) 02/21/2022    PROTTOTAL 6.8 02/21/2022    ALBUMIN 3.1 (L) 02/21/2022       General: NAD  HEENT: sclera anicteric. Sl conj erythema and tearing   No oral ulcers or erythema    Lungs:  CTA b/l   no wheezes  CV: RRR  no gallop  Ext/ Skin: faint macular erythema over abdomen- centrally more brown/gerard color lateral chest/torso  sl  faintly erythematous lesion marginally rashed slightly erythematous lesions scattered on arms but " fading since steroid creams started. 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 S/p allo sib PBSCT. (ABO matched)  HCT-CI score: 3 (prior solid tumor)  Day +100 bone marrow biopsy is 100% donor with no morphologic or flow cytometric evidence of leukemia BCR abl is pending.    - Day +180 restaging BM bx- still in CR  100% donor;  BCR Abl was missed- 2/16 pending from serum.       2.  HEME:   - Transfuse for hgb <7g/dL; plt < 10k.  No transfusions needs  -  pulmonary emboli: He developed a pulmonary emboli in the setting of receiving PEG asparaginase.   On Xarelto but because this was a provoked thrombus when the next phase of his treatment is settled; held for liver biopsy and will NOT be resumed.      3.  FEN/Renal: Oral intake good/stable  - Cr stable  - Tac induced hypomag, Off now but follow Mag  - He has a history of renal cancer and resection. has a single kidney.    4.    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  - Skin significantly improved to resolved 2/21. Use steroid topicals minimally and only on areas that are red/ pink.  - New chronic GVHD symptoms mouth and eyes. Injected, watering eyes  - 2/10 saw optho: mild GVHD of eyes- recommend Maxitrol to lids, continue refreshing drops QID.   - 2/7 Mouth: lichnoid changes on soft palpate and bilateral buccal mucosa. Continue dex swish and  spit TID,  Using tac ointment to lips BID.   - 2/16 Transaminitis worsening, again 2/21. IR guided liver bx done today, hepatitis work up in process    Could be limited cGVHD but eyes mild; mouth minimal and rash seems somewhat better with topical TMC (will extend it to suprapubic region) and HC to face.  Will cont on tac without further taper. Once GVHD rash resolves, mouth improved will try tac taper again If worsens will consider systemic treatmetn.     5.  ID: Afebrile  - prophy acv (letrmovir stopped at day +100),  - Schedule inhaled pentamidine 2/23. (it was due 2/10).   - CMV neg 1/4 and 2/7    - influenza vacc given 10/26.  Covid vaccine given November 18- given second dose 12/14     Pt given info on [facility]--    6. Endo: Hx of graves disease; On synthroid 175 mcg daily     7. CV: Norvasc: Decrease to 2.5mg 2/21    8. GI: no n/v/d  Omeprazole for heartburn  LFTs as above, liver biopsy done today    9. Psych: more anxiety - harder to manage as limited activity (would typically kind of work off stem but cant do that with weather/restrictions post transplant  - Situational anxiety - start lexapro 10mg daily. Also difficult sleeping refilled ativan 1mg at bedtime prn  As halcion was not helpful.  Discussed would not recommend ativan for sleep long term. Hopeful lexapro will help with anxiety and therefore sleep.   - if not consider true sleep aid. Unisom? Trazadone was not effective.     Plan:   - Decrease body topical creams unless area is still pink/ red  - Decrease norvasc to 2.5mg/day  - Do not resume xarelto  - Continue dex swish and spit to mouth  - Protopic to lips BID  - Continue tac to 1mg qAm and 0.5mg wHS -- Plan to continue for several weeks until all GVHD symptoms resolve (rash resolves) before resuming tac taper.   - Pending liver biopsy result with [facility] Thursday    Patient to call with any new/worsening symptoms    Layla Sahni [facility]  [phone]

## 2022-02-21 NOTE — ANESTHESIA CARE TRANSFER NOTE
Patient: Nik Nava    Procedure: Procedure(s):  NEEDLE BIOPSY, LIVER, PERCUTANEOUS       Diagnosis: Acute lymphoid leukemia in remission (H) [C91.01]  Diagnosis Additional Information: No value filed.    Anesthesia Type:   MAC     Note:    Oropharynx: oropharynx clear of all foreign objects and spontaneously breathing  Level of Consciousness: awake  Oxygen Supplementation: room air    Independent Airway: airway patency satisfactory and stable  Dentition: dentition unchanged  Vital Signs Stable: post-procedure vital signs reviewed and stable  Report to RN Given: handoff report given  Patient transferred to: Phase II    Handoff Report: Identifed the Patient, Identified the Reponsible Provider, Reviewed the pertinent medical history, Discussed the surgical course, Reviewed Intra-OP anesthesia mangement and issues during anesthesia, Set expectations for post-procedure period and Allowed opportunity for questions and acknowledgement of understanding      Vitals:  Vitals Value Taken Time   BP     Temp     Pulse     Resp     SpO2         Electronically Signed By: PEE Juárez CRNA  February 21, 2022  9:28 AM

## 2022-02-21 NOTE — LETTER
"    2/21/2022         RE: Nik Nava  [id number] [facility] [id number]        Dear Colleague,    Thank you for referring your patient, Nik Nava, to the [facility] AND [facility]ANSPL[facility] [place]. Please see a copy of my visit note below.    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 Eyes still dry, uses ointment and drops as directed. No eye pain. No n/v/d. Liver biopsy done this morning.    Review of Systems                                                                                                                                         8 point Review of systems was o/w negative     PHYSICAL EXAM                                                                                                                                                 KPS: 90  Blood pressure 105/65, pulse 83, temperature 97.6  F (36.4  C), temperature source Oral, resp. rate 16, height 1.854 m (6' 0.99\"), weight 106.9 kg (235 lb 11.2 oz), SpO2 97 %.    Wt Readings from Last 4 Encounters:   02/21/22 106.9 kg (235 lb 11.2 oz)   02/21/22 106.7 kg (235 lb 4.8 oz)   02/16/22 106.7 kg (235 lb 4.8 oz)   02/10/22 105.5 kg (232 lb 9.6 oz)      Lab Results   Component Value Date    WBC 6.4 02/21/2022    ANEU 3.5 10/26/2021    HGB 13.7 02/21/2022    HCT 41.7 02/21/2022    PLT 83 (L) 02/21/2022     02/21/2022    POTASSIUM 3.9 02/21/2022    CHLORIDE 107 02/21/2022    CO2 24 02/21/2022     (H) 02/21/2022    BUN 29 02/21/2022    CR 1.21 02/21/2022    MAG 2.0 02/21/2022    INR 0.96 02/07/2022    BILITOTAL 0.6 02/21/2022     (H) 02/21/2022     (H) 02/21/2022    ALKPHOS 151 (H) 02/21/2022    PROTTOTAL 6.8 02/21/2022    ALBUMIN 3.1 (L) 02/21/2022       General: NAD  HEENT: sclera anicteric. Sl conj erythema and tearing  "  No oral ulcers or erythema    Lungs:  CTA b/l   no wheezes  CV: RRR  no gallop  Ext/ Skin: faint macular erythema over abdomen- centrally more brown/gerard color lateral chest/torso  sl  faintly erythematous lesion marginally rashed slightly erythematous lesions scattered on arms but fading since steroid creams started. 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 S/p allo sib PBSCT. (ABO matched)  HCT-CI score: 3 (prior solid tumor)  Day +100 bone marrow biopsy is 100% donor with no morphologic or flow cytometric evidence of leukemia BCR abl is pending.    - Day +180 restaging BM bx- still in CR  100% donor;  BCR Abl was missed- 2/16 pending from serum.       2.  HEME:   - Transfuse for hgb <7g/dL; plt < 10k.  No transfusions needs  -  pulmonary emboli: He developed a pulmonary emboli in the setting of receiving PEG asparaginase.   On Xarelto but because this was a provoked thrombus when the next phase of his treatment is settled; held for liver biopsy and will NOT be resumed.      3.  FEN/Renal: Oral intake good/stable  - Cr stable  - Tac induced hypomag, Off now but follow Mag  - He has a history of renal cancer and resection. has a single kidney.    4.    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  - Skin significantly improved to resolved 2/21. Use steroid topicals minimally and only on areas  that are red/ pink.  - New chronic GVHD symptoms mouth and eyes. Injected, watering eyes  - 2/10 saw optho: mild GVHD of eyes- recommend Maxitrol to lids, continue refreshing drops QID.   - 2/7 Mouth: lichnoid changes on soft palpate and bilateral buccal mucosa. Continue dex swish and spit TID,  Using tac ointment to lips BID.   - 2/16 Transaminitis worsening, again 2/21. IR guided liver bx done today, hepatitis work up in process    Could be limited cGVHD but eyes mild; mouth minimal and rash seems somewhat better with topical TMC (will extend it to suprapubic region) and HC to face.  Will cont on tac without further taper. Once GVHD rash resolves, mouth improved will try tac taper again If worsens will consider systemic treatmetn.     5.  ID: Afebrile  - prophy acv (letrmovir stopped at day +100),  - Schedule inhaled pentamidine 2/23. (it was due 2/10).   - CMV neg 1/4 and 2/7    - influenza vacc given 10/26.  Covid vaccine given November 18- given second dose 12/14     Pt given info on SHIVANI--    6. Endo: Hx of graves disease; On synthroid 175 mcg daily     7. CV: Norvasc: Decrease to 2.5mg 2/21    8. GI: no n/v/d  Omeprazole for heartburn  LFTs as above, liver biopsy done today    9. Psych: more anxiety - harder to manage as limited activity (would typically kind of work off stem but cant do that with weather/restrictions post transplant  - Situational anxiety - start lexapro 10mg daily. Also difficult sleeping refilled ativan 1mg at bedtime prn  As halcion was not helpful.  Discussed would not recommend ativan for sleep long term. Hopeful lexapro will help with anxiety and therefore sleep.   - if not consider true sleep aid. Unisom? Trazadone was not effective.     Plan:   - Decrease body topical creams unless area is still pink/ red  - Decrease norvasc to 2.5mg/day  - Do not resume xarelto  - Continue dex swish and spit to mouth  - Protopic to lips BID  - Continue tac to 1mg qAm and 0.5mg wHS -- Plan to  continue for several weeks until all GVHD symptoms resolve (rash resolves) before resuming tac taper.   - Pending liver biopsy result with [facility] Thursday    Patient to call with any new/worsening symptoms    Layla Sahni PAC  [phone]              Again, thank you for allowing me to participate in the care of your patient.        Sincerely,        BMT Advanced Practice Provider

## 2022-02-21 NOTE — ANESTHESIA POSTPROCEDURE EVALUATION
Patient: Nik Nava    Procedure: Procedure(s):  NEEDLE BIOPSY, LIVER, PERCUTANEOUS       Anesthesia Type:  MAC    Note:  Disposition: Outpatient   Postop Pain Control: Uneventful            Sign Out: Well controlled pain   PONV: No   Neuro/Psych: Uneventful            Sign Out: Acceptable/Baseline neuro status   Airway/Respiratory: Uneventful            Sign Out: Acceptable/Baseline resp. status   CV/Hemodynamics: Uneventful            Sign Out: Acceptable CV status; No obvious hypovolemia; No obvious fluid overload   Other NRE: NONE   DID A NON-ROUTINE EVENT OCCUR? No           Last vitals:  Vitals Value Taken Time   /60 02/21/22 1030   Temp 36.9  C (98.5  F) 02/21/22 1030   Pulse 57 02/21/22 1030   Resp 18 02/21/22 1030   SpO2 98 % 02/21/22 1030       Electronically Signed By: Gerson Norris MD  February 21, 2022  11:18 AM

## 2022-02-21 NOTE — BRIEF OP NOTE
[facility]    Brief Operative Note    Pre-operative diagnosis: Acute lymphoid leukemia in remission (H) [C91.01]  Post-operative diagnosis Same as pre-operative diagnosis    Procedure: Procedure(s):  NEEDLE BIOPSY, LIVER, PERCUTANEOUS  Surgeon: Surgeon(s) and Role:     * Trace Castellanos MD - Primary  Anesthesia: Monitor Anesthesia Care   Estimated Blood Loss: Minimal    Drains: None  Specimens:   ID Type Source Tests Collected by Time Destination   1 : Liver Biopsy  Biopsy Other SURGICAL PATHOLOGY EXAM Trace Castellanos MD 2/21/2022  9:11 AM      Findings:   None.  Complications: None.  Implants: * No implants in log *    Random native liver biopsy. 3 cores. Gelfoam.     Salvador Reynoso PA-C  Interventional Radiology  [phone]

## 2022-02-21 NOTE — NURSING NOTE
Chief Complaint   Patient presents with     Port Draw     Labs drawn via port by RN in lab. VS taken      Pt came with port accessed from biopsy. Labs drawn via port by RN in lab. Flushed with saline and heparin. VS taken. Checked in for next appointment.    Alba Johansen RN

## 2022-02-21 NOTE — ANESTHESIA PREPROCEDURE EVALUATION
Anesthesia Pre-Procedure Evaluation    Patient: Nik Nava   MRN: [id number] : 1960        Preoperative Diagnosis: Acute lymphoid leukemia in remission (H) [C91.01]    Procedure : Procedure(s):  NEEDLE BIOPSY, LIVER, PERCUTANEOUS          Past Medical History:   Diagnosis Date     Cancer (H)     renal cell     CKD (chronic kidney disease)      Thyroid disease       Past Surgical History:   Procedure Laterality Date     BACK SURGERY  2017     BONE MARROW BIOPSY, BONE SPECIMEN, NEEDLE/TROCAR Left 2021    Procedure: BIOPSY, BONE MARROW;  Surgeon: Jailyn Ny APRN CNP;  Location: [facility] OR     BONE MARROW BIOPSY, BONE SPECIMEN, NEEDLE/TROCAR Left 11/15/2021    Procedure: BIOPSY, BONE MARROW;  Surgeon: Socrates De La Torre;  Location: [facility] OR     BONE MARROW BIOPSY, BONE SPECIMEN, NEEDLE/TROCAR Left 2022    Procedure: BIOPSY, BONE MARROW;  Surgeon: Renetta Wiggins PA-C;  Location: [facility] OR     IR CVC TUNNEL PLACEMENT > 5 YRS OF AGE  2021     IR CVC TUNNEL REMOVAL LEFT  2021      Allergies   Allergen Reactions     Sulfamethoxazole W/Trimethoprim Rash      Social History     Tobacco Use     Smoking status: Former Smoker     Packs/day: 2.00     Years: 30.00     Pack years: 60.00     Quit date: 2019     Years since quittin.8     Smokeless tobacco: Never Used   Substance Use Topics     Alcohol use: Not Currently      Wt Readings from Last 1 Encounters:   22 106.7 kg (235 lb 4.8 oz)        Anesthesia Evaluation   Pt has had prior anesthetic.     No history of anesthetic complications       ROS/MED HX  ENT/Pulmonary:     (+) tobacco use, Past use,     Neurologic:       Cardiovascular:     (+) hypertension-----Previous cardiac testing   Echo: Date: 21 Results:  Interpretation Summary  Global and regional left ventricular function is normal with an EF of 55-60%.  3D LVEF volumetric analysis is 59%.  Global peak LV longitudinal strain is averaged at -20%. This is  normal (normal  <-18%).  Global right ventricular function is normal. The right ventricle is normal  size.  No significant valvular abnormalities.  IVC diameter <2.1 cm collapsing >50% with sniff suggests a normal RA pressure  of 3 mmHg.  There is no prior study for direct comparison.  Stress Test: Date: Results:    ECG Reviewed: Date: Results:    Cath: Date: Results:      METS/Exercise Tolerance:     Hematologic:     (+) anemia,     Musculoskeletal:       GI/Hepatic:       Renal/Genitourinary:     (+) renal disease, type: CRI,     Endo:     (+) thyroid problem, hypothyroidism, Obesity,     Psychiatric/Substance Use:       Infectious Disease:       Malignancy: Comment: Renal cancer, ALL      Other:            Physical Exam    Airway        Mallampati: II       Respiratory Devices and Support         Dental           Cardiovascular          Rhythm and rate: regular     Pulmonary           breath sounds clear to auscultation           OUTSIDE LABS:  CBC:   Lab Results   Component Value Date    WBC 8.9 02/16/2022    WBC 7.8 02/07/2022    HGB 13.9 02/16/2022    HGB 13.4 02/07/2022    HCT 41.6 02/16/2022    HCT 40.8 02/07/2022    PLT 92 (L) 02/16/2022    PLT 92 (L) 02/07/2022     BMP:   Lab Results   Component Value Date     02/16/2022     02/07/2022    POTASSIUM 4.7 02/16/2022    POTASSIUM 4.5 02/07/2022    CHLORIDE 106 02/16/2022    CHLORIDE 105 02/07/2022    CO2 22 02/16/2022    CO2 23 02/07/2022    BUN 30 02/16/2022    BUN 26 02/07/2022    CR 1.21 02/16/2022    CR 1.32 (H) 02/07/2022     (H) 02/16/2022     (H) 02/07/2022     COAGS:   Lab Results   Component Value Date    PTT 28 08/03/2021    INR 0.96 02/07/2022     POC: No results found for: BGM, HCG, HCGS  HEPATIC:   Lab Results   Component Value Date    ALBUMIN 3.2 (L) 02/16/2022    PROTTOTAL 7.1 02/16/2022     (H) 02/16/2022     (H) 02/16/2022    ALKPHOS 146 02/16/2022    BILITOTAL 0.6 02/16/2022     OTHER:   Lab Results    Component Value Date    LACT 0.6 (L) 09/12/2021    DAMON 9.3 02/16/2022    PHOS 3.5 08/22/2021    MAG 1.9 02/16/2022    TSH 0.23 (L) 11/18/2021    T4 1.33 11/18/2021    CRP 4.5 09/12/2021    SED 56 (H) 09/12/2021       Anesthesia Plan    ASA Status:  2   NPO Status:  NPO Appropriate    Anesthesia Type: MAC.              Consents    Anesthesia Plan(s) and associated risks, benefits, and realistic alternatives discussed. Questions answered and patient/representative(s) expressed understanding.    - Discussed:     - Discussed with:  Patient         Postoperative Care            Comments:           H&P reviewed: Unable to attach H&P to encounter due to EHR limitations. H&P Update: appropriate H&P reviewed, patient examined. No interval changes since H&P (within 30 days).         Rusty Murcia MD

## 2022-02-21 NOTE — DISCHARGE INSTRUCTIONS
Discharge Instructions for Liver Biopsy  You had a procedure called liver biopsy. A healthcare provider used a special needle to remove a small piece of tissue from your liver.  A liver biopsy is ordered after other tests have shown that your liver is not working properly. You may also have a liver biopsy when liver disease is suspected.  Home care  Recommendations include the following:     If you had anesthesia, you should not drive until the day after your biopsy.     Remove the bandage covering the biopsy site 48 hours after the procedure.    Bedrest for 4 hours immediately after the procedure.    Don t shower for 48 hours after the biopsy. If you wish, you may wash yourself with a sponge or washcloth. When you are able to shower, don t scrub the site. Gently wash the area and pat it dry.    Don t lift anything heavier than 10 pounds for 3 days after the procedure, or as advised by your healthcare provider.    Don't do strenuous activities or exercises after the procedure.    Ask your healthcare provider when you can return to work.    Do not start taking blood thinners without clear instructions from your healthcare provider.  Follow-up care  Make a follow-up appointment as directed by our staff.     When to call your healthcare provider  Call your healthcare provider immediately if you have any of the following:    Bleeding from the biopsy site    Dizziness or lightheadedness    Sudden or increased shortness of breath    Sudden chest pain    Fever of 100.4 F (38.0 C) or higher, or as directed by your healthcare provider    Shaking chills    Increasing redness, tenderness, or swelling at the biopsy site    Drainage from the biopsy site    Opening of the biopsy site    Increasing pain, with or without activity, in the liver or belly area, or pain shooting to the right shoulder     Additional Instructions:    Please call our IR service for the following problems:       - If the skin around the biopsy sight is  "swollen, reddened, painful, or has any discharge.  - If you have persistent pain in biopsy sight.  - If you have a fever of greater than 100.5  F and chills.  - If you feel nauseated and \"just not right.\"      [facility]  Interventional Radiology (IR)  [address] [facility]  [address] [facility] [place] [address]    Contact Number:  [phone]  (IR control desk)  - Monday - Friday 8:00 am - 4:30 pm    After hours for urgent concerns:  [phone]  - After 4:30 pm Monday - Friday, Weekends and Holidays.   - Ask for Interventional Radiology on-call.  Someone is available 24 hours a day.  - [employer] toll free number:  [phone]               "

## 2022-02-22 LAB
CMV DNA SPEC NAA+PROBE-ACNC: NOT DETECTED IU/ML
EBV DNA COPIES/ML, INSTRUMENT: 8765 COPIES/ML
EBV DNA SPEC NAA+PROBE-LOG#: 3.9 {LOG_COPIES}/ML

## 2022-02-23 DIAGNOSIS — Z94.81 STATUS POST BONE MARROW TRANSPLANT (H): Primary | ICD-10-CM

## 2022-02-23 DIAGNOSIS — C91.01 ACUTE LYMPHOBLASTIC LEUKEMIA (ALL) IN REMISSION (H): ICD-10-CM

## 2022-02-23 LAB — HADV DNA # SPEC NAA+PROBE: NOT DETECTED COPIES/ML

## 2022-02-24 ENCOUNTER — LAB (OUTPATIENT)
Dept: LAB | Facility: CLINIC | Age: 62
End: 2022-02-24
Attending: INTERNAL MEDICINE
Payer: COMMERCIAL

## 2022-02-24 ENCOUNTER — ONCOLOGY VISIT (OUTPATIENT)
Dept: TRANSPLANT | Facility: CLINIC | Age: 62
End: 2022-02-24
Attending: INTERNAL MEDICINE
Payer: COMMERCIAL

## 2022-02-24 VITALS
DIASTOLIC BLOOD PRESSURE: 78 MMHG | BODY MASS INDEX: 30.6 KG/M2 | OXYGEN SATURATION: 98 % | HEIGHT: 73 IN | TEMPERATURE: 97.9 F | SYSTOLIC BLOOD PRESSURE: 123 MMHG | RESPIRATION RATE: 16 BRPM | WEIGHT: 230.9 LBS | HEART RATE: 74 BPM

## 2022-02-24 DIAGNOSIS — C91.01 ACUTE LYMPHOBLASTIC LEUKEMIA (ALL) IN REMISSION (H): ICD-10-CM

## 2022-02-24 DIAGNOSIS — Z94.81 STATUS POST BONE MARROW TRANSPLANT (H): ICD-10-CM

## 2022-02-24 DIAGNOSIS — H04.129 DRY EYE: ICD-10-CM

## 2022-02-24 DIAGNOSIS — C91.01 ACUTE LYMPHOBLASTIC LEUKEMIA (ALL) IN REMISSION (H): Primary | ICD-10-CM

## 2022-02-24 LAB
ALBUMIN SERPL-MCNC: 3.1 G/DL (ref 3.4–5)
ALP SERPL-CCNC: 173 U/L (ref 40–150)
ALT SERPL W P-5'-P-CCNC: 436 U/L (ref 0–70)
ANION GAP SERPL CALCULATED.3IONS-SCNC: 8 MMOL/L (ref 3–14)
AST SERPL W P-5'-P-CCNC: 420 U/L (ref 0–45)
BASOPHILS # BLD AUTO: 0.1 10E3/UL (ref 0–0.2)
BASOPHILS NFR BLD AUTO: 1 %
BILIRUB DIRECT SERPL-MCNC: 0.2 MG/DL (ref 0–0.2)
BILIRUB SERPL-MCNC: 0.6 MG/DL (ref 0.2–1.3)
BUN SERPL-MCNC: 30 MG/DL (ref 7–30)
CALCIUM SERPL-MCNC: 8.8 MG/DL (ref 8.5–10.1)
CHLORIDE BLD-SCNC: 105 MMOL/L (ref 94–109)
CO2 SERPL-SCNC: 25 MMOL/L (ref 20–32)
CREAT SERPL-MCNC: 1.22 MG/DL (ref 0.66–1.25)
EOSINOPHIL # BLD AUTO: 1.1 10E3/UL (ref 0–0.7)
EOSINOPHIL NFR BLD AUTO: 14 %
ERYTHROCYTE [DISTWIDTH] IN BLOOD BY AUTOMATED COUNT: 16.5 % (ref 10–15)
GFR SERPL CREATININE-BSD FRML MDRD: 67 ML/MIN/1.73M2
GLUCOSE BLD-MCNC: 98 MG/DL (ref 70–99)
HCT VFR BLD AUTO: 42.5 % (ref 40–53)
HGB BLD-MCNC: 14.4 G/DL (ref 13.3–17.7)
IMM GRANULOCYTES # BLD: 0 10E3/UL
IMM GRANULOCYTES NFR BLD: 1 %
LYMPHOCYTES # BLD AUTO: 2.6 10E3/UL (ref 0.8–5.3)
LYMPHOCYTES NFR BLD AUTO: 35 %
MCH RBC QN AUTO: 31.3 PG (ref 26.5–33)
MCHC RBC AUTO-ENTMCNC: 33.9 G/DL (ref 31.5–36.5)
MCV RBC AUTO: 92 FL (ref 78–100)
MONOCYTES # BLD AUTO: 0.9 10E3/UL (ref 0–1.3)
MONOCYTES NFR BLD AUTO: 12 %
NEUTROPHILS # BLD AUTO: 2.9 10E3/UL (ref 1.6–8.3)
NEUTROPHILS NFR BLD AUTO: 37 %
NRBC # BLD AUTO: 0 10E3/UL
NRBC BLD AUTO-RTO: 0 /100
PATH REPORT.ADDENDUM SPEC: NORMAL
PATH REPORT.ADDENDUM SPEC: NORMAL
PATH REPORT.COMMENTS IMP SPEC: NORMAL
PATH REPORT.FINAL DX SPEC: NORMAL
PATH REPORT.GROSS SPEC: NORMAL
PATH REPORT.MICROSCOPIC SPEC OTHER STN: NORMAL
PATH REPORT.RELEVANT HX SPEC: NORMAL
PHOTO IMAGE: NORMAL
PLATELET # BLD AUTO: 68 10E3/UL (ref 150–450)
POTASSIUM BLD-SCNC: 4.1 MMOL/L (ref 3.4–5.3)
PROT SERPL-MCNC: 7.1 G/DL (ref 6.8–8.8)
RBC # BLD AUTO: 4.6 10E6/UL (ref 4.4–5.9)
SODIUM SERPL-SCNC: 138 MMOL/L (ref 133–144)
WBC # BLD AUTO: 7.7 10E3/UL (ref 4–11)

## 2022-02-24 PROCEDURE — G0463 HOSPITAL OUTPT CLINIC VISIT: HCPCS

## 2022-02-24 PROCEDURE — 80053 COMPREHEN METABOLIC PANEL: CPT | Performed by: INTERNAL MEDICINE

## 2022-02-24 PROCEDURE — 250N000011 HC RX IP 250 OP 636: Performed by: INTERNAL MEDICINE

## 2022-02-24 PROCEDURE — 36592 COLLECT BLOOD FROM PICC: CPT

## 2022-02-24 PROCEDURE — 85025 COMPLETE CBC W/AUTO DIFF WBC: CPT | Performed by: INTERNAL MEDICINE

## 2022-02-24 PROCEDURE — 82248 BILIRUBIN DIRECT: CPT | Performed by: INTERNAL MEDICINE

## 2022-02-24 PROCEDURE — 36415 COLL VENOUS BLD VENIPUNCTURE: CPT

## 2022-02-24 PROCEDURE — 99214 OFFICE O/P EST MOD 30 MIN: CPT | Performed by: INTERNAL MEDICINE

## 2022-02-24 PROCEDURE — 87799 DETECT AGENT NOS DNA QUANT: CPT | Performed by: INTERNAL MEDICINE

## 2022-02-24 RX ORDER — OMEPRAZOLE 40 MG/1
40 CAPSULE, DELAYED RELEASE ORAL DAILY
Refills: 3 | COMMUNITY
Start: 2022-02-24 | End: 2022-03-16

## 2022-02-24 RX ORDER — TACROLIMUS 0.5 MG/1
CAPSULE ORAL
Qty: 60 CAPSULE | Refills: 1 | Status: SHIPPED | OUTPATIENT
Start: 2022-02-24 | End: 2022-02-25

## 2022-02-24 RX ORDER — HEPARIN SODIUM (PORCINE) LOCK FLUSH IV SOLN 100 UNIT/ML 100 UNIT/ML
5 SOLUTION INTRAVENOUS
Status: COMPLETED | OUTPATIENT
Start: 2022-02-24 | End: 2022-02-24

## 2022-02-24 RX ORDER — NEOMYCIN SULFATE, POLYMYXIN B SULFATE, AND DEXAMETHASONE 3.5; 10000; 1 MG/G; [USP'U]/G; MG/G
0.5 OINTMENT OPHTHALMIC AT BEDTIME
Qty: 3.5 G | Refills: 4 | Status: SHIPPED | OUTPATIENT
Start: 2022-02-24 | End: 2022-08-18

## 2022-02-24 RX ORDER — TACROLIMUS 1 MG/1
CAPSULE ORAL
Qty: 120 CAPSULE | Refills: 3 | Status: SHIPPED | OUTPATIENT
Start: 2022-02-24 | End: 2022-04-01

## 2022-02-24 RX ADMIN — Medication 5 ML: at 17:08

## 2022-02-24 ASSESSMENT — PAIN SCALES - GENERAL: PAINLEVEL: NO PAIN (0)

## 2022-02-24 NOTE — PROGRESS NOTES
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 Eyes still dry, uses ointment and drops as directed. No eye pain. No n/v/d. mouth also still dry but not sore.  Eating ok; no loose stools;  no new area of rash.    Review of Systems                                                                                                                                         8 point Review of systems was o/w negative     PHYSICAL EXAM                                                                                                                                                 KPS: 90  Blood pressure 123/78, pulse 74, temperature 97.9  F (36.6  C), temperature source Oral, resp. rate 16, height 1.854 m (6' 0.99\"), weight 104.7 kg (230 lb 14.4 oz), SpO2 98 %.    Wt Readings from Last 4 Encounters:   02/24/22 104.7 kg (230 lb 14.4 oz)   02/21/22 106.7 kg (235 lb 4.8 oz)   02/21/22 106.9 kg (235 lb 11.2 oz)   02/16/22 106.7 kg (235 lb 4.8 oz)      Lab Results   Component Value Date    WBC 7.7 02/24/2022    ANEU 3.5 10/26/2021    HGB 14.4 02/24/2022    HCT 42.5 02/24/2022    PLT 68 (L) 02/24/2022     02/24/2022    POTASSIUM 4.1 02/24/2022    CHLORIDE 105 02/24/2022    CO2 25 02/24/2022    GLC 98 02/24/2022    BUN 30 02/24/2022    CR 1.22 02/24/2022    MAG 2.0 02/21/2022    INR 0.96 02/07/2022    BILITOTAL 0.6 02/24/2022     (H) 02/24/2022     (H) 02/24/2022    ALKPHOS 173 (H) 02/24/2022    PROTTOTAL 7.1 02/24/2022    ALBUMIN 3.1 (L) 02/24/2022       General: NAD  HEENT: sclera anicteric. Sl conj erythema and tearing   No oral ulcers or erythema  but mild lichenoid changes on both buccal mucosa; L>R but none under sonia  Lungs:  CTA b/l   no wheezes  CV: RRR  no gallop  Abd; soft no tenderness; BS nl  No big Hep or Spleen; no " rebound.  Ext/ Skin: faint macular erythema over abdomen- centrally more brown/gerard color lateral chest/torso  sl  faintly erythematous lesion marginally rashed slightly erythematous lesions scattered on arms but fading since steroid creams started. Groin/supra pubic area pink/purple; not pruritic; less pink than last week.  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 S/p allo sib PBSCT. (ABO matched)  HCT-CI score: 3 (prior solid tumor)  Day +100 bone marrow biopsy is 100% donor with no morphologic or flow cytometric evidence of leukemia BCR abl is pending.    - Day +180 restaging BM bx- still in CR  100% donor;  BCR Abl was missed- 2/16 pending from serum.       2.  HEME:   - Transfuse for hgb <7g/dL; plt < 10k.  No transfusions needs  -  pulmonary emboli: He developed a pulmonary emboli in the setting of receiving PEG asparaginase.   On Xarelto but because this was a provoked thrombus when the next phase of his treatment is settled; held for liver biopsy and will NOT be resumed.      3.  FEN/Renal: Oral intake good/stable  - Cr stable  - Tac induced hypomag, Off now but follow Mag  - He has a history of renal cancer and resection. has a single kidney.    4.    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 Use steroid topicals minimally and only on  areas that are red/ pink.  - New chronic GVHD symptoms mouth and eyes. Injected, watering eyes;  Abd LFT; worse today  - 2/10 saw optho: mild GVHD of eyes- recommend Maxitrol to lids, continue refreshing drops QID.   - 2/7 Mouth: lichnoid changes on soft palpate and bilateral buccal mucosa. Continue dex swish and spit TID,  Using tac ointment to lips BID.   - 2/16 Transaminitis worsening, again 2/21 and 2/24.   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      Consented today for PQRST observational study. IF viral testing confirmed as negative  will start prednisone 1 mg/kg next week for 2 weeks; then taper over 2 weeks to 0.75 mg/kg/day and then taper over 2 weeks to 0.5 mg/kg/day and then taper over 4 weeks to 0.5 mg/kg every other day  Modified based on symptom and transaminase response.    5.  ID: Afebrile  - prophy acv (letrmovir stopped at day +100),  - Schedule inhaled pentamidine 2/23. (it was due 2/10).   - CMV neg 1/4 and 2/7    - influenza vacc given 10/26.  Covid vaccine given November 18- given second dose 12/14     Pt given info on SHIVANI--    6. Endo: Hx of graves disease; On synthroid 175 mcg daily     7. CV: Norvasc: Decrease to 2.5mg 2/21    8. GI: no n/v/d  Omeprazole for heartburn  LFTs     9. Psych: more anxiety - harder to manage as limited activity (would typically kind of work off stem but cant do that with weather/restrictions post transplant  - Situational anxiety - start lexapro 10mg daily. Also difficult sleeping refilled ativan 1mg at bedtime prn  As halcion was not helpful.  Discussed would not recommend ativan for sleep long term. Hopeful lexapro will help with anxiety and therefore sleep.   - if not consider true sleep aid.  Unisom? Trazadone was not effective.     Plan:   - Decrease body topical creams unless area is still pink/ red  - Decrease norvasc to 2.5mg/day  - Do not resume xarelto  - Continue dex swish and spit to mouth  - Protopic to lips BID  - Continue tac to 1mg qAm and 0.5mg wHS --   Start prednisone as above if virus testing is negative      RTC Tuesday  PFTs  visit and labs    MICHELLE Hill

## 2022-02-24 NOTE — LETTER
"    2/24/2022         RE: Nik Nava  [address] [facility] [address]        Dear Colleague,    Thank you for referring your patient, Nik Nava, to the [facility] BLOOD AND MARROW TRANSPLANT PROGRAM [place]. Please see a copy of my visit note below.        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 Eyes still dry, uses ointment and drops as directed. No eye pain. No n/v/d. mouth also still dry but not sore.  Eating ok; no loose stools;  no new area of rash.    Review of Systems                                                                                                                                         8 point Review of systems was o/w negative     PHYSICAL EXAM                                                                                                                                                 KPS: 90  Blood pressure 123/78, pulse 74, temperature 97.9  F (36.6  C), temperature source Oral, resp. rate 16, height 1.854 m (6' 0.99\"), weight 104.7 kg (230 lb 14.4 oz), SpO2 98 %.    Wt Readings from Last 4 Encounters:   02/24/22 104.7 kg (230 lb 14.4 oz)   02/21/22 106.7 kg (235 lb 4.8 oz)   02/21/22 106.9 kg (235 lb 11.2 oz)   02/16/22 106.7 kg (235 lb 4.8 oz)      Lab Results   Component Value Date    WBC 7.7 02/24/2022    ANEU 3.5 10/26/2021    HGB 14.4 02/24/2022    HCT 42.5 02/24/2022    PLT 68 (L) 02/24/2022     02/24/2022    POTASSIUM 4.1 02/24/2022    CHLORIDE 105 02/24/2022    CO2 25 02/24/2022    GLC 98 02/24/2022    BUN 30 02/24/2022    CR 1.22 02/24/2022    MAG 2.0 02/21/2022    INR 0.96 02/07/2022    BILITOTAL 0.6 02/24/2022     (H) 02/24/2022     (H) 02/24/2022    ALKPHOS 173 (H) 02/24/2022    PROTTOTAL 7.1 02/24/2022    ALBUMIN 3.1 (L) 02/24/2022 "       General: NAD  HEENT: sclera anicteric. Sl conj erythema and tearing   No oral ulcers or erythema  but mild lichenoid changes on both buccal mucosa; L>R but none under sonia  Lungs:  CTA b/l   no wheezes  CV: RRR  no gallop  Abd; soft no tenderness; BS nl  No big Hep or Spleen; no rebound.  Ext/ Skin: faint macular erythema over abdomen- centrally more brown/gerard color lateral chest/torso  sl  faintly erythematous lesion marginally rashed slightly erythematous lesions scattered on arms but fading since steroid creams started. Groin/supra pubic area pink/purple; not pruritic; less pink than last week.  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 S/p allo sib PBSCT. (ABO matched)  HCT-CI score: 3 (prior solid tumor)  Day +100 bone marrow biopsy is 100% donor with no morphologic or flow cytometric evidence of leukemia BCR abl is pending.    - Day +180 restaging BM bx- still in CR  100% donor;  BCR Abl was missed- 2/16 pending from serum.       2.  HEME:   - Transfuse for hgb <7g/dL; plt < 10k.  No transfusions needs  -  pulmonary emboli: He developed a pulmonary emboli in the setting of receiving PEG asparaginase.   On Xarelto but because this was a provoked thrombus when the next phase of his treatment is settled; held for liver biopsy and will NOT be resumed.      3.  FEN/Renal: Oral intake good/stable  - Cr stable  - Tac induced hypomag, Off now but follow Mag  - He has a history of renal cancer and resection.  has a single kidney.    4.    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 Use steroid topicals minimally and only on areas that are red/ pink.  - New chronic GVHD symptoms mouth and eyes. Injected, watering eyes;  Abd LFT; worse today  - 2/10 saw optho: mild GVHD of eyes- recommend Maxitrol to lids, continue refreshing drops QID.   - 2/7 Mouth: lichnoid changes on soft palpate and bilateral buccal mucosa. Continue dex swish and spit TID,  Using tac ointment to lips BID.   - 2/16 Transaminitis worsening, again 2/21 and 2/24.   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      Consented today for PQRST observational study. IF viral testing confirmed as negative  will start prednisone 1 mg/kg next week for 2 weeks; then taper over 2 weeks to 0.75 mg/kg/day and then taper over 2 weeks to 0.5 mg/kg/day and then taper over 4 weeks to 0.5 mg/kg every other day  Modified based on symptom and transaminase response.    5.  ID: Afebrile  - prophy acv (letrmovir stopped at day +100),  - Schedule inhaled pentamidine 2/23. (it was due 2/10).   - CMV neg 1/4 and 2/7    - influenza vacc given 10/26.  Covid vaccine given November 18- given second dose 12/14     Pt given info on SHIVANI--    6. Endo: Hx of graves disease; On synthroid 175 mcg daily     7. CV: Norvasc: Decrease to 2.5mg 2/21    8. GI: no n/v/d  Omeprazole for heartburn  LFTs     9. Psych: more anxiety - harder to manage as limited activity (would typically kind of work off stem but cant do that with weather/restrictions post transplant  -  Situational anxiety - start lexapro 10mg daily. Also difficult sleeping refilled ativan 1mg at bedtime prn  As halcion was not helpful.  Discussed would not recommend ativan for sleep long term. Hopeful lexapro will help with anxiety and therefore sleep.   - if not consider true sleep aid. Unisom? Trazadone was not effective.     Plan:   - Decrease body topical creams unless area is still pink/ red  - Decrease norvasc to 2.5mg/day  - Do not resume xarelto  - Continue dex swish and spit to mouth  - Protopic to lips BID  - Continue tac to 1mg qAm and 0.5mg wHS --   Start prednisone as above if virus testing is negative      RTC Tuesday  PFTs  visit and labs    MICHELLE Hill            Again, thank you for allowing me to participate in the care of your patient.        Sincerely,        Chapo Hill MD

## 2022-02-24 NOTE — NURSING NOTE
"Oncology Rooming Note    February 24, 2022 5:31 PM   Nik Nava is a 61 year old male who presents for:    Chief Complaint   Patient presents with     Port Draw     labs drawn from port by rn.  vs taken     Oncology Clinic Visit     [facility] RETURN - ALL     Initial Vitals: /78 (BP Location: Right arm, Patient Position: Sitting, Cuff Size: Adult Large)   Pulse 74   Temp 97.9  F (36.6  C) (Oral)   Resp 16   Ht 1.854 m (6' 0.99\")   Wt 104.7 kg (230 lb 14.4 oz)   SpO2 98%   BMI 30.47 kg/m   Estimated body mass index is 30.47 kg/m  as calculated from the following:    Height as of this encounter: 1.854 m (6' 0.99\").    Weight as of this encounter: 104.7 kg (230 lb 14.4 oz). Body surface area is 2.32 meters squared.  No Pain (0) Comment: Data Unavailable   No LMP for male patient.  Allergies reviewed: Yes  Medications reviewed: Yes    Medications: Medication refills not needed today.  Pharmacy name entered into [employer]:    [facility] - [place], [place] - [address] [facility] [place], [place] -  [phone]    Clinical concerns: No new concerns. Sergio was notified.      Kelvin Gomez LPN            "

## 2022-02-25 LAB
CMV DNA SPEC NAA+PROBE-ACNC: NOT DETECTED IU/ML
EBV DNA COPIES/ML, INSTRUMENT: 5643 COPIES/ML
EBV DNA SPEC NAA+PROBE-LOG#: 3.8 {LOG_COPIES}/ML

## 2022-02-25 RX ORDER — TACROLIMUS 0.5 MG/1
CAPSULE ORAL
Qty: 60 CAPSULE | Refills: 1 | COMMUNITY
Start: 2022-02-25 | End: 2022-04-01

## 2022-02-25 NOTE — NURSING NOTE
Research labs completed via venipuncture, patient discharged.    See flow sheets.    Lauren Sommer CMA ([facility])

## 2022-03-01 ENCOUNTER — APPOINTMENT (OUTPATIENT)
Dept: LAB | Facility: CLINIC | Age: 62
End: 2022-03-01
Attending: INTERNAL MEDICINE
Payer: COMMERCIAL

## 2022-03-01 ENCOUNTER — ONCOLOGY VISIT (OUTPATIENT)
Dept: TRANSPLANT | Facility: CLINIC | Age: 62
End: 2022-03-01
Attending: STUDENT IN AN ORGANIZED HEALTH CARE EDUCATION/TRAINING PROGRAM
Payer: COMMERCIAL

## 2022-03-01 VITALS
DIASTOLIC BLOOD PRESSURE: 72 MMHG | SYSTOLIC BLOOD PRESSURE: 108 MMHG | TEMPERATURE: 97.9 F | BODY MASS INDEX: 30.1 KG/M2 | OXYGEN SATURATION: 98 % | RESPIRATION RATE: 16 BRPM | HEART RATE: 90 BPM | WEIGHT: 228.1 LBS

## 2022-03-01 DIAGNOSIS — H04.129 DRY EYE: ICD-10-CM

## 2022-03-01 DIAGNOSIS — Z94.81 STATUS POST BONE MARROW TRANSPLANT (H): ICD-10-CM

## 2022-03-01 DIAGNOSIS — C91.01 ACUTE LYMPHOBLASTIC LEUKEMIA (ALL) IN REMISSION (H): ICD-10-CM

## 2022-03-01 LAB
ALBUMIN SERPL-MCNC: 3 G/DL (ref 3.4–5)
ALP SERPL-CCNC: 200 U/L (ref 40–150)
ALT SERPL W P-5'-P-CCNC: 534 U/L (ref 0–70)
ANION GAP SERPL CALCULATED.3IONS-SCNC: 8 MMOL/L (ref 3–14)
AST SERPL W P-5'-P-CCNC: 556 U/L (ref 0–45)
BASOPHILS # BLD AUTO: 0.1 10E3/UL (ref 0–0.2)
BASOPHILS NFR BLD AUTO: 1 %
BILIRUB SERPL-MCNC: 0.7 MG/DL (ref 0.2–1.3)
BUN SERPL-MCNC: 29 MG/DL (ref 7–30)
CALCIUM SERPL-MCNC: 9.2 MG/DL (ref 8.5–10.1)
CHLORIDE BLD-SCNC: 107 MMOL/L (ref 94–109)
CMV DNA SPEC NAA+PROBE-ACNC: NOT DETECTED IU/ML
CO2 SERPL-SCNC: 24 MMOL/L (ref 20–32)
CREAT SERPL-MCNC: 1.23 MG/DL (ref 0.66–1.25)
EOSINOPHIL # BLD AUTO: 1.1 10E3/UL (ref 0–0.7)
EOSINOPHIL NFR BLD AUTO: 15 %
ERYTHROCYTE [DISTWIDTH] IN BLOOD BY AUTOMATED COUNT: 16.3 % (ref 10–15)
GFR SERPL CREATININE-BSD FRML MDRD: 66 ML/MIN/1.73M2
GLUCOSE BLD-MCNC: 104 MG/DL (ref 70–99)
HCT VFR BLD AUTO: 43.9 % (ref 40–53)
HGB BLD-MCNC: 14.7 G/DL (ref 13.3–17.7)
IMM GRANULOCYTES # BLD: 0 10E3/UL
IMM GRANULOCYTES NFR BLD: 1 %
LYMPHOCYTES # BLD AUTO: 3 10E3/UL (ref 0.8–5.3)
LYMPHOCYTES NFR BLD AUTO: 37 %
MCH RBC QN AUTO: 30.7 PG (ref 26.5–33)
MCHC RBC AUTO-ENTMCNC: 33.5 G/DL (ref 31.5–36.5)
MCV RBC AUTO: 92 FL (ref 78–100)
MONOCYTES # BLD AUTO: 0.9 10E3/UL (ref 0–1.3)
MONOCYTES NFR BLD AUTO: 11 %
NEUTROPHILS # BLD AUTO: 2.7 10E3/UL (ref 1.6–8.3)
NEUTROPHILS NFR BLD AUTO: 35 %
NRBC # BLD AUTO: 0 10E3/UL
NRBC BLD AUTO-RTO: 0 /100
PLATELET # BLD AUTO: 57 10E3/UL (ref 150–450)
POTASSIUM BLD-SCNC: 4.2 MMOL/L (ref 3.4–5.3)
PROT SERPL-MCNC: 7 G/DL (ref 6.8–8.8)
RBC # BLD AUTO: 4.79 10E6/UL (ref 4.4–5.9)
SODIUM SERPL-SCNC: 139 MMOL/L (ref 133–144)
WBC # BLD AUTO: 7.9 10E3/UL (ref 4–11)

## 2022-03-01 PROCEDURE — 80053 COMPREHEN METABOLIC PANEL: CPT

## 2022-03-01 PROCEDURE — 94375 RESPIRATORY FLOW VOLUME LOOP: CPT | Performed by: INTERNAL MEDICINE

## 2022-03-01 PROCEDURE — 94726 PLETHYSMOGRAPHY LUNG VOLUMES: CPT | Performed by: INTERNAL MEDICINE

## 2022-03-01 PROCEDURE — 99215 OFFICE O/P EST HI 40 MIN: CPT

## 2022-03-01 PROCEDURE — 94729 DIFFUSING CAPACITY: CPT | Performed by: INTERNAL MEDICINE

## 2022-03-01 PROCEDURE — 250N000011 HC RX IP 250 OP 636: Performed by: STUDENT IN AN ORGANIZED HEALTH CARE EDUCATION/TRAINING PROGRAM

## 2022-03-01 PROCEDURE — 85025 COMPLETE CBC W/AUTO DIFF WBC: CPT

## 2022-03-01 PROCEDURE — G0463 HOSPITAL OUTPT CLINIC VISIT: HCPCS

## 2022-03-01 PROCEDURE — 87799 DETECT AGENT NOS DNA QUANT: CPT

## 2022-03-01 PROCEDURE — 36591 DRAW BLOOD OFF VENOUS DEVICE: CPT

## 2022-03-01 RX ORDER — HEPARIN SODIUM (PORCINE) LOCK FLUSH IV SOLN 100 UNIT/ML 100 UNIT/ML
500 SOLUTION INTRAVENOUS ONCE
Status: COMPLETED | OUTPATIENT
Start: 2022-03-01 | End: 2022-03-01

## 2022-03-01 RX ORDER — PREDNISONE 50 MG/1
100 TABLET ORAL DAILY
Qty: 28 TABLET | Refills: 0 | Status: SHIPPED | OUTPATIENT
Start: 2022-03-01 | End: 2022-03-01

## 2022-03-01 RX ORDER — PREDNISONE 50 MG/1
100 TABLET ORAL DAILY
Qty: 28 TABLET | Refills: 0 | Status: SHIPPED | OUTPATIENT
Start: 2022-03-01 | End: 2022-08-18

## 2022-03-01 RX ORDER — LEVOFLOXACIN 500 MG/1
500 TABLET, FILM COATED ORAL DAILY
Qty: 7 TABLET | Refills: 0 | Status: SHIPPED | OUTPATIENT
Start: 2022-03-01 | End: 2022-03-09

## 2022-03-01 RX ADMIN — Medication 500 UNITS: at 07:39

## 2022-03-01 ASSESSMENT — PAIN SCALES - GENERAL: PAINLEVEL: NO PAIN (0)

## 2022-03-01 NOTE — NURSING NOTE
"Oncology Rooming Note    March 1, 2022 9:23 AM   Nik Nava is a 61 year old male who presents for:    Chief Complaint   Patient presents with     Port Draw     Labs drawn via port by RN. Vitals taken.     RECHECK     Pt is here for a rtn for  S/P ALL      Initial Vitals: Blood Pressure 108/72 (BP Location: Right arm)   Pulse 90   Temperature 97.9  F (36.6  C) (Oral)   Respiration 16   Weight 103.5 kg (228 lb 1.6 oz)   Oxygen Saturation 98%   Body Mass Index 30.10 kg/m   Estimated body mass index is 30.1 kg/m  as calculated from the following:    Height as of 2/24/22: 1.854 m (6' 0.99\").    Weight as of this encounter: 103.5 kg (228 lb 1.6 oz). Body surface area is 2.31 meters squared.  No Pain (0) Comment: Data Unavailable   No LMP for male patient.  Allergies reviewed: Yes  Medications reviewed: Yes    Medications: Medication refills not needed today.  Pharmacy name entered into [employer]:    [facility] - [place], [place] - [address] [facility] [place], [place] - [address] [facility] [phone]    Clinical concerns: none       Lynn Weeks MA            "

## 2022-03-01 NOTE — LETTER
3/1/2022         RE: Nik Nava  [address] [facility] [address]        Dear Colleague,    Thank you for referring your patient, Nik Nava, to the [facility] BLOOD AND MARROW TRANSPLANT PROGRAM [place]. Please see a copy of my visit note below.      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 Eyes still dry, uses ointment and drops as directed. No eye pain. No n/v/d. mouth also still dry but not sore.  Eating ok; no loose stools;  no new area of rash.    Review of Systems                                                                                                                                         8 point Review of systems was o/w negative     PHYSICAL EXAM                                                                                                                                                 KPS: 90  Blood pressure 108/72, pulse 90, temperature 97.9  F (36.6  C), temperature source Oral, resp. rate 16, weight 103.5 kg (228 lb 1.6 oz), SpO2 98 %.    Wt Readings from Last 4 Encounters:   03/01/22 103.5 kg (228 lb 1.6 oz)   02/24/22 104.7 kg (230 lb 14.4 oz)   02/21/22 106.7 kg (235 lb 4.8 oz)   02/21/22 106.9 kg (235 lb 11.2 oz)      Lab Results   Component Value Date    WBC 7.9 03/01/2022    ANEU 3.5 10/26/2021    HGB 14.7 03/01/2022    HCT 43.9 03/01/2022    PLT 57 (L) 03/01/2022     03/01/2022    POTASSIUM 4.2 03/01/2022    CHLORIDE 107 03/01/2022    CO2 24 03/01/2022     (H) 03/01/2022    BUN 29 03/01/2022    CR 1.23 03/01/2022    MAG 2.0 02/21/2022    INR 0.96 02/07/2022    BILITOTAL 0.7 03/01/2022     (HH) 03/01/2022     (HH) 03/01/2022    ALKPHOS 200 (H) 03/01/2022    PROTTOTAL 7.0 03/01/2022    ALBUMIN 3.0 (L) 03/01/2022       General: NAD  HEENT:  sclera anicteric. Sl conj erythema and tearing, less 3/1. Geographic tongue. No oral ulcers or erythema but mild lichenoid changes on both buccal mucosa  Lungs:  CTA b/l   no wheezes  CV: RRR  no gallop  Abd; soft no tenderness; BS nl  No big Hep or Spleen; no rebound.  Ext/ Skin: 2-3 papules on arms, previous rash changes are centrally more brown/gerard color lateral chest/torso. Groin/supra pubic area hyperpigmented/purple and is not pruritic  Psych:  Good outlook  No LE edema or bone tenderness  Full ROM UE and LE; full ROM wrists, fingers, ankles     Current aGVHD staging: Skin 2 (<9% BSA active)- spares, face, head, lower extremities, UGI 0, LGI 0, Liver 2 (3/1/2022)      ASSESSMENT AND PLAN   Nik Nava is a 61 year old male with Ph Pos ALL, day 203 s/p sib allo stem cell transplant    Day -6 (8/4): flu/cy  Day -5 through day -2 (8/5-8/8): flu  Day -1 (8/9): TBI  Day 0 (8/10): transplant     1.  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 S/p allo sib PBSCT. (ABO matched)  HCT-CI score: 3 (prior solid tumor)  Day +100 bone marrow biopsy is 100% donor with no morphologic or flow cytometric evidence of leukemia BCR abl is pending.  - Day +180 restaging BM bx- still in CR  100% donor;  BCR Abl was missed- 2/16 pending from serum.     2.  HEME:   - Transfuse for hgb <7g/dL; plt < 10k.  No transfusions needs  -  pulmonary emboli: He developed a pulmonary emboli in the setting of receiving PEG asparaginase.   On Xarelto but because this was a provoked thrombus when the next phase of his treatment is settled; held for liver biopsy and will NOT be resumed.    3.  FEN/Renal: Oral intake good/stable  - Cr stable  - Tac induced hypomag, Off now but follow Mag  - He has a history of renal cancer and resection. has a single kidney.    4.    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 Use steroid topicals minimally and only on areas that are red/ pink.  - New chronic GVHD symptoms mouth and eyes. Injected, watering eyes;  Abd LFT; worse today  - 2/10 saw optho: mild GVHD of eyes- recommend Maxitrol to lids, continue refreshing drops QID.   - 2/7 Mouth: lichnoid changes on soft palpate and bilateral buccal mucosa. Continue dex swish and spit TID,  Using tac ointment to lips BID.   - 2/16 Transaminitis worsening, again 2/21 and 2/24. 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 Now requested to stop any groin steroid cream 3/1  PFTs done today 3/1    Consented 2/24 for PQRST observational study. As viral testing confirmed as negative:  3/1: start prednisone 1 mg/kg next week for 2 weeks; then taper over 2 weeks to 0.75 mg/kg/day and then taper over 2 weeks to 0.5 mg/kg/day and then taper over 4 weeks to 0.5 mg/kg every other day  Modified based on symptom and transaminase response.    5.  ID: Afebrile  Otitis media 3/1: left ear is red, injected and has pocket of collected fluid (appears intact). Take levaquin 500mg daily x 7 days.  - prophy acv (letrmovir stopped at day +100), resume levaquin prophy after course of levaquin for ear infection. Micafungin for antifungal coverage while on pred with such dysfunctional LFTs  - Viral panel taken (elevated LFTs) EBV to 5k, will monitor however this does not explain extent of liver dysfunction.  - Pentamidine 2/23. (it was due 2/10).   - CMV neg 2/24  - influenza vacc given 10/26.  Covid vaccine given November 18- given second dose 12/14     Pt given info on EVUSHELD--    6. Endo: Hx of graves disease; On synthroid 175 mcg  daily     7. CV: Norvasc: Decrease to 2.5mg 2/21.    8. GI: no n/v/d  Omeprazole for heartburn  LFTs as above    9. Psych: more anxiety - harder to manage as limited activity (would typically kind of work off stem but cant do that with weather/restrictions post transplant  - Situational anxiety - start lexapro 10mg daily. Also difficult sleeping refilled ativan 1mg at bedtime prn  As halcion was not helpful.  Discussed would not recommend ativan for sleep long term. Hopeful lexapro will help with anxiety and therefore sleep.   - if not consider true sleep aid. Unisom? Trazadone was not effective. Monitor while on steroids      Plan:   PFT results pending  Prednisone 1mg/kg x2 weeks started today  Micafungin requested to start tomorrow on MWF schedule (will try for FHI week of 3/8 or as soon as possible)  Levaquin treatment dose x1 week (otitis media)  Levaquin prophy after treatment  Cont acyclovir    RTC tomorrow francisco javier, recheck LFTs by Friday    Layla Sahni PAC  [phone]      Again, thank you for allowing me to participate in the care of your patient.      Sincerely,    BMT Advanced Practice Provider

## 2022-03-01 NOTE — PROGRESS NOTES
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 Eyes still dry, uses ointment and drops as directed. No eye pain. No n/v/d. mouth also still dry but not sore.  Eating ok; no loose stools;  no new area of rash.    Review of Systems                                                                                                                                         8 point Review of systems was o/w negative     PHYSICAL EXAM                                                                                                                                                 KPS: 90  Blood pressure 108/72, pulse 90, temperature 97.9  F (36.6  C), temperature source Oral, resp. rate 16, weight 103.5 kg (228 lb 1.6 oz), SpO2 98 %.    Wt Readings from Last 4 Encounters:   03/01/22 103.5 kg (228 lb 1.6 oz)   02/24/22 104.7 kg (230 lb 14.4 oz)   02/21/22 106.7 kg (235 lb 4.8 oz)   02/21/22 106.9 kg (235 lb 11.2 oz)      Lab Results   Component Value Date    WBC 7.9 03/01/2022    ANEU 3.5 10/26/2021    HGB 14.7 03/01/2022    HCT 43.9 03/01/2022    PLT 57 (L) 03/01/2022     03/01/2022    POTASSIUM 4.2 03/01/2022    CHLORIDE 107 03/01/2022    CO2 24 03/01/2022     (H) 03/01/2022    BUN 29 03/01/2022    CR 1.23 03/01/2022    MAG 2.0 02/21/2022    INR 0.96 02/07/2022    BILITOTAL 0.7 03/01/2022     (HH) 03/01/2022     (HH) 03/01/2022    ALKPHOS 200 (H) 03/01/2022    PROTTOTAL 7.0 03/01/2022    ALBUMIN 3.0 (L) 03/01/2022       General: NAD  HEENT: sclera anicteric. Sl conj erythema and tearing, less 3/1. Geographic tongue. No oral ulcers or erythema but mild lichenoid changes on both buccal mucosa  Lungs:  CTA b/l   no wheezes  CV: RRR  no gallop  Abd; soft no tenderness; BS nl  No big Hep or Spleen; no rebound.  Ext/ Skin: 2-3  papules on arms, previous rash changes are centrally more brown/gerard color lateral chest/torso. Groin/supra pubic area hyperpigmented/purple and is not pruritic  Psych:  Good outlook  No LE edema or bone tenderness  Full ROM UE and LE; full ROM wrists, fingers, ankles     Current aGVHD staging: Skin 2 (<9% BSA active)- spares, face, head, lower extremities, UGI 0, LGI 0, Liver 2 (3/1/2022)      ASSESSMENT AND PLAN   Nik Nava is a 61 year old male with Ph Pos ALL, day 203 s/p sib allo stem cell transplant    Day -6 (8/4): flu/cy  Day -5 through day -2 (8/5-8/8): flu  Day -1 (8/9): TBI  Day 0 (8/10): transplant     1.  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 S/p allo sib PBSCT. (ABO matched)  HCT-CI score: 3 (prior solid tumor)  Day +100 bone marrow biopsy is 100% donor with no morphologic or flow cytometric evidence of leukemia BCR abl is pending.  - Day +180 restaging BM bx- still in CR  100% donor;  BCR Abl was missed- 2/16 pending from serum.     2.  HEME:   - Transfuse for hgb <7g/dL; plt < 10k.  No transfusions needs  -  pulmonary emboli: He developed a pulmonary emboli in the setting of receiving PEG asparaginase.   On Xarelto but because this was a provoked thrombus when the next phase of his treatment is settled; held for liver biopsy and will NOT be resumed.    3.  FEN/Renal: Oral intake good/stable  - Cr stable  - Tac induced hypomag, Off now but follow Mag  - He has a history of renal cancer and resection. has a single kidney.    4.    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 Use steroid topicals minimally and only on areas that are red/ pink.  - New chronic GVHD symptoms mouth and eyes. Injected, watering eyes;  Abd LFT; worse today  - 2/10 saw optho: mild GVHD of eyes- recommend Maxitrol  to lids, continue refreshing drops QID.   - 2/7 Mouth: lichnoid changes on soft palpate and bilateral buccal mucosa. Continue dex swish and spit TID,  Using tac ointment to lips BID.   - 2/16 Transaminitis worsening, again 2/21 and 2/24. 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 Now requested to stop any groin steroid cream 3/1  PFTs done today 3/1    Consented 2/24 for PQRST observational study. As viral testing confirmed as negative:  3/1: start prednisone 1 mg/kg next week for 2 weeks; then taper over 2 weeks to 0.75 mg/kg/day and then taper over 2 weeks to 0.5 mg/kg/day and then taper over 4 weeks to 0.5 mg/kg every other day  Modified based on symptom and transaminase response.    5.  ID: Afebrile  Otitis media 3/1: left ear is red, injected and has pocket of collected fluid (appears intact). Take levaquin 500mg daily x 7 days.  - prophy acv (letrmovir stopped at day +100), resume levaquin prophy after course of levaquin for ear infection. Micafungin for antifungal coverage while on pred with such dysfunctional LFTs  - Viral panel taken (elevated LFTs) EBV to 5k, will monitor however this does not explain extent of liver dysfunction.  - Pentamidine 2/23. (it was due 2/10).   - CMV neg 2/24  - influenza vacc given 10/26.  Covid vaccine given November 18- given second dose 12/14     Pt given info on EVUSHELD--    6. Endo: Hx of graves disease; On synthroid 175 mcg daily     7. CV: Norvasc: Decrease to 2.5mg 2/21.    8. GI: no n/v/d  Omeprazole for heartburn  LFTs as above    9. Psych: more anxiety - harder to manage as limited activity (would typically kind of work off stem but cant do that with weather/restrictions post transplant  - Situational  anxiety - start lexapro 10mg daily. Also difficult sleeping refilled ativan 1mg at bedtime prn  As halcion was not helpful.  Discussed would not recommend ativan for sleep long term. Hopeful lexapro will help with anxiety and therefore sleep.   - if not consider true sleep aid. Unisom? Trazadone was not effective. Monitor while on steroids      Plan:   PFT results pending  Prednisone 1mg/kg x2 weeks started today  Micafungin requested to start tomorrow on MWF schedule (will try for FHI week of 3/8 or as soon as possible)  Levaquin treatment dose x1 week (otitis media)  Levaquin prophy after treatment  Cont acyclovir    RTC tomorrow francisco javier, recheck LFTs by Friday    Layla Sahni PAC  [phone]

## 2022-03-02 ENCOUNTER — INFUSION THERAPY VISIT (OUTPATIENT)
Dept: TRANSPLANT | Facility: CLINIC | Age: 62
End: 2022-03-02
Attending: STUDENT IN AN ORGANIZED HEALTH CARE EDUCATION/TRAINING PROGRAM
Payer: COMMERCIAL

## 2022-03-02 ENCOUNTER — APPOINTMENT (OUTPATIENT)
Dept: LAB | Facility: CLINIC | Age: 62
End: 2022-03-02
Attending: INTERNAL MEDICINE
Payer: COMMERCIAL

## 2022-03-02 VITALS
WEIGHT: 228.8 LBS | OXYGEN SATURATION: 98 % | HEART RATE: 69 BPM | BODY MASS INDEX: 30.19 KG/M2 | RESPIRATION RATE: 16 BRPM | TEMPERATURE: 97.5 F | DIASTOLIC BLOOD PRESSURE: 74 MMHG | SYSTOLIC BLOOD PRESSURE: 123 MMHG

## 2022-03-02 DIAGNOSIS — C91.01 ACUTE LYMPHOBLASTIC LEUKEMIA (ALL) IN REMISSION (H): Primary | ICD-10-CM

## 2022-03-02 DIAGNOSIS — Z94.81 STATUS POST BONE MARROW TRANSPLANT (H): Primary | ICD-10-CM

## 2022-03-02 DIAGNOSIS — C91.01 ACUTE LYMPHOBLASTIC LEUKEMIA (ALL) IN REMISSION (H): ICD-10-CM

## 2022-03-02 LAB
ANION GAP SERPL CALCULATED.3IONS-SCNC: 8 MMOL/L (ref 3–14)
BASOPHILS # BLD AUTO: 0 10E3/UL (ref 0–0.2)
BASOPHILS NFR BLD AUTO: 0 %
BUN SERPL-MCNC: 39 MG/DL (ref 7–30)
CALCIUM SERPL-MCNC: 9.6 MG/DL (ref 8.5–10.1)
CHLORIDE BLD-SCNC: 106 MMOL/L (ref 94–109)
CO2 SERPL-SCNC: 23 MMOL/L (ref 20–32)
CREAT SERPL-MCNC: 1.27 MG/DL (ref 0.66–1.25)
DLCOCOR-%PRED-PRE: 123 %
DLCOCOR-PRE: 36.6 ML/MIN/MMHG
DLCOUNC-%PRED-PRE: 122 %
DLCOUNC-PRE: 36.4 ML/MIN/MMHG
DLCOUNC-PRED: 29.67 ML/MIN/MMHG
EBV DNA COPIES/ML, INSTRUMENT: 7838 COPIES/ML
EBV DNA SPEC NAA+PROBE-LOG#: 3.9 {LOG_COPIES}/ML
EOSINOPHIL # BLD AUTO: 0 10E3/UL (ref 0–0.7)
EOSINOPHIL NFR BLD AUTO: 0 %
ERV-%PRED-PRE: 215 %
ERV-PRE: 2.57 L
ERV-PRED: 1.19 L
ERYTHROCYTE [DISTWIDTH] IN BLOOD BY AUTOMATED COUNT: 16.5 % (ref 10–15)
EXPTIME-PRE: 8.02 SEC
FEF2575-%PRED-PRE: 84 %
FEF2575-PRE: 2.57 L/SEC
FEF2575-PRED: 3.03 L/SEC
FEFMAX-%PRED-PRE: 109 %
FEFMAX-PRE: 10.64 L/SEC
FEFMAX-PRED: 9.68 L/SEC
FEV1-%PRED-PRE: 104 %
FEV1-PRE: 3.99 L
FEV1FEV6-PRE: 73 %
FEV1FEV6-PRED: 79 %
FEV1FVC-PRE: 71 %
FEV1FVC-PRED: 77 %
FEV1SVC-PRE: 66 %
FEV1SVC-PRED: 70 %
FIFMAX-PRE: 7 L/SEC
FRCPLETH-%PRED-PRE: 127 %
FRCPLETH-PRE: 4.86 L
FRCPLETH-PRED: 3.82 L
FVC-%PRED-PRE: 112 %
FVC-PRE: 5.62 L
FVC-PRED: 5.01 L
GFR SERPL CREATININE-BSD FRML MDRD: 63 ML/MIN/1.73M2
GLUCOSE BLD-MCNC: 113 MG/DL (ref 70–99)
HCT VFR BLD AUTO: 43 % (ref 40–53)
HGB BLD-MCNC: 14.5 G/DL (ref 13.3–17.7)
IC-%PRED-PRE: 80 %
IC-PRE: 3.44 L
IC-PRED: 4.26 L
IMM GRANULOCYTES # BLD: 0 10E3/UL
IMM GRANULOCYTES NFR BLD: 0 %
LYMPHOCYTES # BLD AUTO: 2.2 10E3/UL (ref 0.8–5.3)
LYMPHOCYTES NFR BLD AUTO: 22 %
MCH RBC QN AUTO: 31 PG (ref 26.5–33)
MCHC RBC AUTO-ENTMCNC: 33.7 G/DL (ref 31.5–36.5)
MCV RBC AUTO: 92 FL (ref 78–100)
MONOCYTES # BLD AUTO: 0.8 10E3/UL (ref 0–1.3)
MONOCYTES NFR BLD AUTO: 8 %
NEUTROPHILS # BLD AUTO: 6.9 10E3/UL (ref 1.6–8.3)
NEUTROPHILS NFR BLD AUTO: 70 %
NRBC # BLD AUTO: 0 10E3/UL
NRBC BLD AUTO-RTO: 0 /100
PLATELET # BLD AUTO: 70 10E3/UL (ref 150–450)
POTASSIUM BLD-SCNC: 4.2 MMOL/L (ref 3.4–5.3)
RBC # BLD AUTO: 4.68 10E6/UL (ref 4.4–5.9)
RVPLETH-%PRED-PRE: 88 %
RVPLETH-PRE: 2.29 L
RVPLETH-PRED: 2.59 L
SODIUM SERPL-SCNC: 137 MMOL/L (ref 133–144)
TLCPLETH-%PRED-PRE: 107 %
TLCPLETH-PRE: 8.31 L
TLCPLETH-PRED: 7.74 L
VA-%PRED-PRE: 111 %
VA-PRE: 8.04 L
VC-%PRED-PRE: 110 %
VC-PRE: 6.02 L
VC-PRED: 5.45 L
WBC # BLD AUTO: 9.9 10E3/UL (ref 4–11)

## 2022-03-02 PROCEDURE — 96365 THER/PROPH/DIAG IV INF INIT: CPT

## 2022-03-02 PROCEDURE — 85004 AUTOMATED DIFF WBC COUNT: CPT

## 2022-03-02 PROCEDURE — 250N000011 HC RX IP 250 OP 636: Performed by: PHYSICIAN ASSISTANT

## 2022-03-02 PROCEDURE — 82310 ASSAY OF CALCIUM: CPT

## 2022-03-02 PROCEDURE — 250N000011 HC RX IP 250 OP 636: Performed by: STUDENT IN AN ORGANIZED HEALTH CARE EDUCATION/TRAINING PROGRAM

## 2022-03-02 PROCEDURE — 999N000104 HC STATISTIC NO CHARGE

## 2022-03-02 PROCEDURE — 258N000003 HC RX IP 258 OP 636: Performed by: STUDENT IN AN ORGANIZED HEALTH CARE EDUCATION/TRAINING PROGRAM

## 2022-03-02 PROCEDURE — G0463 HOSPITAL OUTPT CLINIC VISIT: HCPCS | Mod: 25

## 2022-03-02 PROCEDURE — 99213 OFFICE O/P EST LOW 20 MIN: CPT

## 2022-03-02 PROCEDURE — 36591 DRAW BLOOD OFF VENOUS DEVICE: CPT

## 2022-03-02 PROCEDURE — 96366 THER/PROPH/DIAG IV INF ADDON: CPT

## 2022-03-02 RX ORDER — HEPARIN SODIUM (PORCINE) LOCK FLUSH IV SOLN 100 UNIT/ML 100 UNIT/ML
500 SOLUTION INTRAVENOUS ONCE
Status: COMPLETED | OUTPATIENT
Start: 2022-03-02 | End: 2022-03-02

## 2022-03-02 RX ORDER — ALBUTEROL SULFATE 5 MG/ML
2.5 SOLUTION RESPIRATORY (INHALATION) EVERY 6 HOURS PRN
Status: CANCELLED
Start: 2022-03-04

## 2022-03-02 RX ORDER — HEPARIN SODIUM (PORCINE) LOCK FLUSH IV SOLN 100 UNIT/ML 100 UNIT/ML
5 SOLUTION INTRAVENOUS
Status: CANCELLED | OUTPATIENT
Start: 2022-03-04

## 2022-03-02 RX ORDER — HEPARIN SODIUM (PORCINE) LOCK FLUSH IV SOLN 100 UNIT/ML 100 UNIT/ML
5 SOLUTION INTRAVENOUS
Status: CANCELLED | OUTPATIENT
Start: 2022-03-02

## 2022-03-02 RX ORDER — HEPARIN SODIUM,PORCINE 10 UNIT/ML
5 VIAL (ML) INTRAVENOUS
Status: CANCELLED | OUTPATIENT
Start: 2022-03-02

## 2022-03-02 RX ORDER — HEPARIN SODIUM (PORCINE) LOCK FLUSH IV SOLN 100 UNIT/ML 100 UNIT/ML
5 SOLUTION INTRAVENOUS
Status: DISCONTINUED | OUTPATIENT
Start: 2022-03-02 | End: 2022-03-02 | Stop reason: HOSPADM

## 2022-03-02 RX ORDER — HEPARIN SODIUM,PORCINE 10 UNIT/ML
5 VIAL (ML) INTRAVENOUS
Status: CANCELLED | OUTPATIENT
Start: 2022-03-04

## 2022-03-02 RX ORDER — PENTAMIDINE ISETHIONATE 300 MG/300MG
300 INHALANT RESPIRATORY (INHALATION)
Status: CANCELLED
Start: 2022-03-04

## 2022-03-02 RX ORDER — MICAFUNGIN 10 MG/ML
300 INJECTION, POWDER, LYOPHILIZED, FOR SOLUTION INTRAVENOUS
Qty: 78 EACH | Refills: 0 | COMMUNITY
Start: 2022-03-02 | End: 2022-04-29

## 2022-03-02 RX ADMIN — Medication 5 ML: at 12:22

## 2022-03-02 RX ADMIN — MICAFUNGIN SODIUM 250 MG: 20 INJECTION, POWDER, LYOPHILIZED, FOR SOLUTION INTRAVENOUS at 11:38

## 2022-03-02 RX ADMIN — Medication 500 UNITS: at 10:25

## 2022-03-02 RX ADMIN — MICAFUNGIN SODIUM 50 MG: 50 INJECTION, POWDER, LYOPHILIZED, FOR SOLUTION INTRAVENOUS at 10:48

## 2022-03-02 ASSESSMENT — PAIN SCALES - GENERAL: PAINLEVEL: NO PAIN (0)

## 2022-03-02 NOTE — PROGRESS NOTES
Infusion Nursing Note:  Nik Nava presents today for scheduled micafungin infusion.    Patient seen by provider today: Yes: Layla Sahni GIRISH   present during visit today: Not Applicable.    Note: Labs were monitored. Meds and allergies reviewed. Pt assessed by provider.       Intravenous Access:  Implanted Port.    Treatment Conditions:  Pt received 300 mg micafungin IV.      Post Infusion Assessment:  Patient tolerated infusion without incident.  Port flushed and de-accessed.    Discharge Plan:   Patient discharged in stable condition accompanied by: self.      Ghislaine Collier RN

## 2022-03-02 NOTE — LETTER
3/2/2022         RE: Nik Nava  [address] [facility] [address]        Dear Colleague,    Thank you for referring your patient, Nik Nava, to the [facility] BLOOD AND MARROW TRANSPLANT PROGRAM [place]. Please see a copy of my visit note below.      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 He feels a little shaky since starting pred yesterday. No belly pain. No diarrhea. Rash well resolved. No new complaints today aside from shakiness. Eyes less dry, mouth a little less dry as well.    Review of Systems                                                                                                                                         8 point Review of systems was o/w negative     PHYSICAL EXAM                                                                                                                                                 KPS: 80  Blood pressure 123/74, pulse 69, temperature 97.5  F (36.4  C), temperature source Oral, resp. rate 16, weight 103.8 kg (228 lb 12.8 oz), SpO2 98 %.    Wt Readings from Last 4 Encounters:   03/02/22 103.8 kg (228 lb 12.8 oz)   03/01/22 103.5 kg (228 lb 1.6 oz)   02/24/22 104.7 kg (230 lb 14.4 oz)   02/21/22 106.7 kg (235 lb 4.8 oz)      Lab Results   Component Value Date    WBC 9.9 03/02/2022    ANEU 3.5 10/26/2021    HGB 14.5 03/02/2022    HCT 43.0 03/02/2022    PLT 70 (L) 03/02/2022     03/02/2022    POTASSIUM 4.2 03/02/2022    CHLORIDE 106 03/02/2022    CO2 23 03/02/2022     (H) 03/02/2022    BUN 39 (H) 03/02/2022    CR 1.27 (H) 03/02/2022    MAG 2.0 02/21/2022    INR 0.96 02/07/2022    BILITOTAL 0.7 03/01/2022     (HH) 03/01/2022     (HH) 03/01/2022    ALKPHOS 200 (H) 03/01/2022    PROTTOTAL 7.0 03/01/2022    ALBUMIN 3.0 (L) 03/01/2022       General: NAD  HEENT: sclera anicteric. Sl conj  erythema and tearing, less 3/1. Geographic tongue. No oral ulcers or erythema but mild lichenoid changes on both buccal mucosa  Lungs:  CTA b/l   no wheezes  CV: RRR  no gallop  Abd; soft no tenderness; BS nl  No big Hep or Spleen; no rebound.  Ext/ Skin: 2-3 papules on arms, previous rash changes are centrally more brown/gerard color lateral chest/torso. 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 S/p allo sib PBSCT. (ABO matched)  HCT-CI score: 3 (prior solid tumor)  Day +100 bone marrow biopsy is 100% donor with no morphologic or flow cytometric evidence of leukemia BCR abl is pending.  - Day +180 restaging BM bx- still in CR  100% donor;  BCR Abl was missed- 2/16 pending from serum.     2.  HEME:   - Transfuse for hgb <7g/dL; plt < 10k.  No transfusions needs  -  pulmonary emboli: He developed a pulmonary emboli in the setting of receiving PEG asparaginase.   On Xarelto but because this was a provoked thrombus when the next phase of his treatment is settled; held for liver biopsy and was NOT be resumed.    3.  FEN/Renal: Oral intake good/stable  - Cr stable  - He has a history of renal cancer and resection. has a single kidney.    4.    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 Use steroid topicals minimally and only on areas that are red/  pink.  - New chronic GVHD symptoms mouth and eyes. Injected, watering eyes; LFT; worse 3/1  - 2/10 saw optho: mild GVHD of eyes- recommend Maxitrol to lids, continue refreshing drops QID.   - 2/7 Mouth: lichnoid changes on soft palpate and bilateral buccal mucosa. Continue dex swish and spit TID,  Using tac ointment to lips BID.   - 2/16 Transaminitis worsening, again 2/21 and 2/24. 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 Now requested to stop any groin steroid cream 3/1  PFTs done 3/1    Consented 2/24 for PQRST observational study. As viral testing confirmed as negative:  3/1: start prednisone 1 mg/kg next week for 2 weeks; then taper over 2 weeks to 0.75 mg/kg/day and then taper over 2 weeks to 0.5 mg/kg/day and then taper over 4 weeks to 0.5 mg/kg every other day  Modified based on symptom and transaminase response.    5.  ID: Afebrile  Otitis media 3/1: left ear is red, injected and has pocket of collected fluid (appears intact). Take levaquin 500mg daily x 7 days.  - prophy acv (letrmovir stopped at day +100), resume levaquin prophy after course of levaquin for ear infection. Micafungin for antifungal coverage while on pred- Viral panel taken (elevated LFTs) EBV to 5k, will monitor however this does not explain extent of liver dysfunction.  - Pentamidine 2/23. (it was due 2/10).   - CMV neg 3/1, EBV pending  - influenza vacc given 10/26.  Covid vaccine given November 18- given second dose 12/14     Pt given info on [facility]    6. Endo: Hx of graves disease; On synthroid 175 mcg daily     7. CV: Norvasc: Decrease to 2.5mg 2/21.    8. GI: no n/v/d  Omeprazole for heartburn  LFTs as above    9. Psych: more anxiety - harder to manage as limited  activity (would typically kind of work off stem but cant do that with weather/restrictions post transplant  - Situational anxiety - start lexapro 10mg daily. Also difficult sleeping refilled ativan 1mg at bedtime prn  As halcion was not helpful.  Discussed would not recommend ativan for sleep long term. Hopeful lexapro will help with anxiety and therefore sleep.   - if not consider true sleep aid. Unisom? Trazadone was not effective. Monitor while on steroids      Plan:   PFT results pending  Prednisone 1mg/kg x2 weeks started 3/1, continue  Micafungin given today    RTC Friday for LFTs recheck on pred, francisco javier (hope to transition to [facility] francisco javier next week as he is 40mi each way home to clinic)    Layla Sahni PAC  [phone]          Again, thank you for allowing me to participate in the care of your patient.      Sincerely,    BMT Advanced Practice Provider

## 2022-03-02 NOTE — LETTER
3/2/2022         RE: Nik Nava  [address] [facility] [address]        Dear Colleague,    Thank you for referring your patient, Nik Nava, to the [facility] BLOOD AND MARROW TRANSPLANT PROGRAM [place]. Please see a copy of my visit note below.    Infusion Nursing Note:  Nik Nava presents today for scheduled micafungin infusion.    Patient seen by provider today: Yes: Layla Sahni GIRISH   present during visit today: Not Applicable.    Note: Labs were monitored. Meds and allergies reviewed. Pt assessed by provider.     Intravenous Access:  Implanted Port.    Treatment Conditions:  Pt received 300 mg micafungin IV.    Post Infusion Assessment:  Patient tolerated infusion without incident.  Port flushed and de-accessed.    Discharge Plan:   Patient discharged in stable condition accompanied by: self.    Ghislaine Collier RN      Again, thank you for allowing me to participate in the care of your patient.      Sincerely,    [facility]

## 2022-03-02 NOTE — PROGRESS NOTES
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 He feels a little shaky since starting pred yesterday. No belly pain. No diarrhea. Rash well resolved. No new complaints today aside from shakiness. Eyes less dry, mouth a little less dry as well.    Review of Systems                                                                                                                                         8 point Review of systems was o/w negative     PHYSICAL EXAM                                                                                                                                                 KPS: 80  Blood pressure 123/74, pulse 69, temperature 97.5  F (36.4  C), temperature source Oral, resp. rate 16, weight 103.8 kg (228 lb 12.8 oz), SpO2 98 %.    Wt Readings from Last 4 Encounters:   03/02/22 103.8 kg (228 lb 12.8 oz)   03/01/22 103.5 kg (228 lb 1.6 oz)   02/24/22 104.7 kg (230 lb 14.4 oz)   02/21/22 106.7 kg (235 lb 4.8 oz)      Lab Results   Component Value Date    WBC 9.9 03/02/2022    ANEU 3.5 10/26/2021    HGB 14.5 03/02/2022    HCT 43.0 03/02/2022    PLT 70 (L) 03/02/2022     03/02/2022    POTASSIUM 4.2 03/02/2022    CHLORIDE 106 03/02/2022    CO2 23 03/02/2022     (H) 03/02/2022    BUN 39 (H) 03/02/2022    CR 1.27 (H) 03/02/2022    MAG 2.0 02/21/2022    INR 0.96 02/07/2022    BILITOTAL 0.7 03/01/2022     (HH) 03/01/2022     (HH) 03/01/2022    ALKPHOS 200 (H) 03/01/2022    PROTTOTAL 7.0 03/01/2022    ALBUMIN 3.0 (L) 03/01/2022       General: NAD  HEENT: sclera anicteric. Sl conj erythema and tearing, less 3/1. Geographic tongue. No oral ulcers or erythema but mild lichenoid changes on both buccal mucosa  Lungs:  CTA b/l   no wheezes  CV: RRR  no gallop  Abd; soft no tenderness; BS nl  No big Hep or Spleen; no rebound.  Ext/ Skin: 2-3 papules on arms, previous rash  changes are centrally more brown/gerard color lateral chest/torso. 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 S/p allo sib PBSCT. (ABO matched)  HCT-CI score: 3 (prior solid tumor)  Day +100 bone marrow biopsy is 100% donor with no morphologic or flow cytometric evidence of leukemia BCR abl is pending.  - Day +180 restaging BM bx- still in CR  100% donor;  BCR Abl was missed- 2/16 pending from serum.     2.  HEME:   - Transfuse for hgb <7g/dL; plt < 10k.  No transfusions needs  -  pulmonary emboli: He developed a pulmonary emboli in the setting of receiving PEG asparaginase.   On Xarelto but because this was a provoked thrombus when the next phase of his treatment is settled; held for liver biopsy and was NOT be resumed.    3.  FEN/Renal: Oral intake good/stable  - Cr stable  - He has a history of renal cancer and resection. has a single kidney.    4.    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 Use steroid topicals minimally and only on areas that are red/ pink.  - New chronic GVHD symptoms mouth and eyes. Injected, watering eyes; LFT; worse 3/1  - 2/10 saw optho: mild GVHD of eyes- recommend Maxitrol to lids, continue refreshing drops QID.   - 2/7 Mouth: lichnoid changes on soft palpate and bilateral buccal mucosa. Continue dex swish and spit  TID,  Using tac ointment to lips BID.   - 2/16 Transaminitis worsening, again 2/21 and 2/24. 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 Now requested to stop any groin steroid cream 3/1  PFTs done 3/1    Consented 2/24 for PQRST observational study. As viral testing confirmed as negative:  3/1: start prednisone 1 mg/kg next week for 2 weeks; then taper over 2 weeks to 0.75 mg/kg/day and then taper over 2 weeks to 0.5 mg/kg/day and then taper over 4 weeks to 0.5 mg/kg every other day  Modified based on symptom and transaminase response.    5.  ID: Afebrile  Otitis media 3/1: left ear is red, injected and has pocket of collected fluid (appears intact). Take levaquin 500mg daily x 7 days.  - prophy acv (letrmovir stopped at day +100), resume levaquin prophy after course of levaquin for ear infection. Micafungin for antifungal coverage while on pred- Viral panel taken (elevated LFTs) EBV to 5k, will monitor however this does not explain extent of liver dysfunction.  - Pentamidine 2/23. (it was due 2/10).   - CMV neg 3/1, EBV pending  - influenza vacc given 10/26.  Covid vaccine given November 18- given second dose 12/14     Pt given info on [facility]    6. Endo: Hx of graves disease; On synthroid 175 mcg daily     7. CV: Norvasc: Decrease to 2.5mg 2/21.    8. GI: no n/v/d  Omeprazole for heartburn  LFTs as above    9. Psych: more anxiety - harder to manage as limited activity (would typically kind of work off stem but cant do that with weather/restrictions post transplant  - Situational anxiety - start lexapro 10mg daily. Also difficult sleeping refilled ativan 1mg at bedtime prn  As halcion was not helpful.  Discussed would not recommend ativan for sleep  long term. Hopeful lexapro will help with anxiety and therefore sleep.   - if not consider true sleep aid. Unisom? Trazadone was not effective. Monitor while on steroids      Plan:   PFT results pending  Prednisone 1mg/kg x2 weeks started 3/1, continue  Micafungin given today    RTC Friday for LFTs recheck on pred, francisco javier (hope to transition to [facility]isco javier next week as he is 40mi each way home to clinic)    Layla Sahni PAC  [phone]

## 2022-03-02 NOTE — NURSING NOTE
"Oncology Rooming Note    March 2, 2022 10:43 AM   Nik Nava is a 61 year old male who presents for:    Chief Complaint   Patient presents with     Port Draw     Labs drawn via port by RN. Vitals taken.     RECHECK     provider visit r/t ALL     Initial Vitals: /74 (BP Location: Right arm)   Pulse 69   Temp 97.5  F (36.4  C) (Oral)   Resp 16   Wt 103.8 kg (228 lb 12.8 oz)   SpO2 98%   BMI 30.19 kg/m   Estimated body mass index is 30.19 kg/m  as calculated from the following:    Height as of 2/24/22: 1.854 m (6' 0.99\").    Weight as of this encounter: 103.8 kg (228 lb 12.8 oz). Body surface area is 2.31 meters squared.  No Pain (0) Comment: Data Unavailable   No LMP for male patient.  Allergies reviewed: Yes  Medications reviewed: Yes    Medications: Medication refills not needed today.  Pharmacy name entered into [facility]:    [facility] [place], [place] - [address] [facility] [place], [place] - [phone]    Clinical concerns: None. VSS. Pt denies pain, fever, nausea, vomiting, diarrhea, or respiratory symptoms.       Ghislaine Collier RN              "

## 2022-03-04 ENCOUNTER — HOME INFUSION (PRE-WILLOW HOME INFUSION) (OUTPATIENT)
Dept: PHARMACY | Facility: CLINIC | Age: 62
End: 2022-03-04

## 2022-03-04 ENCOUNTER — APPOINTMENT (OUTPATIENT)
Dept: LAB | Facility: CLINIC | Age: 62
End: 2022-03-04
Attending: INTERNAL MEDICINE
Payer: COMMERCIAL

## 2022-03-04 ENCOUNTER — ONCOLOGY VISIT (OUTPATIENT)
Dept: TRANSPLANT | Facility: CLINIC | Age: 62
End: 2022-03-04
Attending: STUDENT IN AN ORGANIZED HEALTH CARE EDUCATION/TRAINING PROGRAM
Payer: COMMERCIAL

## 2022-03-04 VITALS
RESPIRATION RATE: 14 BRPM | DIASTOLIC BLOOD PRESSURE: 75 MMHG | TEMPERATURE: 97.5 F | BODY MASS INDEX: 30.13 KG/M2 | SYSTOLIC BLOOD PRESSURE: 127 MMHG | WEIGHT: 228.3 LBS | HEART RATE: 67 BPM | OXYGEN SATURATION: 14 %

## 2022-03-04 DIAGNOSIS — Z94.81 STATUS POST BONE MARROW TRANSPLANT (H): Primary | ICD-10-CM

## 2022-03-04 DIAGNOSIS — C91.01 ACUTE LYMPHOBLASTIC LEUKEMIA (ALL) IN REMISSION (H): ICD-10-CM

## 2022-03-04 DIAGNOSIS — C91.01 ACUTE LYMPHOBLASTIC LEUKEMIA (ALL) IN REMISSION (H): Primary | ICD-10-CM

## 2022-03-04 LAB
ALBUMIN SERPL-MCNC: 2.9 G/DL (ref 3.4–5)
ALP SERPL-CCNC: 162 U/L (ref 40–150)
ALT SERPL W P-5'-P-CCNC: 312 U/L (ref 0–70)
ANION GAP SERPL CALCULATED.3IONS-SCNC: 6 MMOL/L (ref 3–14)
AST SERPL W P-5'-P-CCNC: 182 U/L (ref 0–45)
BASOPHILS # BLD AUTO: 0 10E3/UL (ref 0–0.2)
BASOPHILS NFR BLD AUTO: 0 %
BILIRUB SERPL-MCNC: 0.5 MG/DL (ref 0.2–1.3)
BUN SERPL-MCNC: 46 MG/DL (ref 7–30)
CALCIUM SERPL-MCNC: 9.2 MG/DL (ref 8.5–10.1)
CHLORIDE BLD-SCNC: 109 MMOL/L (ref 94–109)
CO2 SERPL-SCNC: 24 MMOL/L (ref 20–32)
CREAT SERPL-MCNC: 1.25 MG/DL (ref 0.66–1.25)
EOSINOPHIL # BLD AUTO: 0 10E3/UL (ref 0–0.7)
EOSINOPHIL NFR BLD AUTO: 0 %
ERYTHROCYTE [DISTWIDTH] IN BLOOD BY AUTOMATED COUNT: 17.2 % (ref 10–15)
GFR SERPL CREATININE-BSD FRML MDRD: 65 ML/MIN/1.73M2
GLUCOSE BLD-MCNC: 101 MG/DL (ref 70–99)
HCT VFR BLD AUTO: 45.7 % (ref 40–53)
HGB BLD-MCNC: 15.3 G/DL (ref 13.3–17.7)
IMM GRANULOCYTES # BLD: 0.1 10E3/UL
IMM GRANULOCYTES NFR BLD: 1 %
LYMPHOCYTES # BLD AUTO: 2.2 10E3/UL (ref 0.8–5.3)
LYMPHOCYTES NFR BLD AUTO: 20 %
MCH RBC QN AUTO: 30.9 PG (ref 26.5–33)
MCHC RBC AUTO-ENTMCNC: 33.5 G/DL (ref 31.5–36.5)
MCV RBC AUTO: 92 FL (ref 78–100)
MONOCYTES # BLD AUTO: 1 10E3/UL (ref 0–1.3)
MONOCYTES NFR BLD AUTO: 9 %
NEUTROPHILS # BLD AUTO: 7.9 10E3/UL (ref 1.6–8.3)
NEUTROPHILS NFR BLD AUTO: 70 %
NRBC # BLD AUTO: 0 10E3/UL
NRBC BLD AUTO-RTO: 0 /100
PLATELET # BLD AUTO: 101 10E3/UL (ref 150–450)
POTASSIUM BLD-SCNC: 4.3 MMOL/L (ref 3.4–5.3)
PROT SERPL-MCNC: 7.1 G/DL (ref 6.8–8.8)
RBC # BLD AUTO: 4.95 10E6/UL (ref 4.4–5.9)
SODIUM SERPL-SCNC: 139 MMOL/L (ref 133–144)
WBC # BLD AUTO: 11.2 10E3/UL (ref 4–11)

## 2022-03-04 PROCEDURE — 250N000011 HC RX IP 250 OP 636: Performed by: STUDENT IN AN ORGANIZED HEALTH CARE EDUCATION/TRAINING PROGRAM

## 2022-03-04 PROCEDURE — 96365 THER/PROPH/DIAG IV INF INIT: CPT

## 2022-03-04 PROCEDURE — 82040 ASSAY OF SERUM ALBUMIN: CPT

## 2022-03-04 PROCEDURE — 80053 COMPREHEN METABOLIC PANEL: CPT

## 2022-03-04 PROCEDURE — 36591 DRAW BLOOD OFF VENOUS DEVICE: CPT

## 2022-03-04 PROCEDURE — 258N000003 HC RX IP 258 OP 636: Performed by: STUDENT IN AN ORGANIZED HEALTH CARE EDUCATION/TRAINING PROGRAM

## 2022-03-04 PROCEDURE — 999N000104 HC STATISTIC NO CHARGE

## 2022-03-04 PROCEDURE — 85025 COMPLETE CBC W/AUTO DIFF WBC: CPT

## 2022-03-04 PROCEDURE — 99214 OFFICE O/P EST MOD 30 MIN: CPT

## 2022-03-04 RX ORDER — PENTAMIDINE ISETHIONATE 300 MG/300MG
300 INHALANT RESPIRATORY (INHALATION)
Status: CANCELLED
Start: 2022-03-07

## 2022-03-04 RX ORDER — SENNOSIDES 8.6 MG
1 TABLET ORAL 3 TIMES DAILY PRN
Qty: 90 TABLET | Refills: 0 | Status: SHIPPED | OUTPATIENT
Start: 2022-03-04

## 2022-03-04 RX ORDER — HEPARIN SODIUM,PORCINE 10 UNIT/ML
5 VIAL (ML) INTRAVENOUS
Status: CANCELLED | OUTPATIENT
Start: 2022-03-07

## 2022-03-04 RX ORDER — HEPARIN SODIUM (PORCINE) LOCK FLUSH IV SOLN 100 UNIT/ML 100 UNIT/ML
5 SOLUTION INTRAVENOUS
Status: CANCELLED | OUTPATIENT
Start: 2022-03-07

## 2022-03-04 RX ORDER — HEPARIN SODIUM (PORCINE) LOCK FLUSH IV SOLN 100 UNIT/ML 100 UNIT/ML
5 SOLUTION INTRAVENOUS DAILY PRN
Status: DISCONTINUED | OUTPATIENT
Start: 2022-03-04 | End: 2022-03-04 | Stop reason: HOSPADM

## 2022-03-04 RX ORDER — ALBUTEROL SULFATE 5 MG/ML
2.5 SOLUTION RESPIRATORY (INHALATION) EVERY 6 HOURS PRN
Status: CANCELLED
Start: 2022-03-07

## 2022-03-04 RX ADMIN — MICAFUNGIN SODIUM 300 MG: 20 INJECTION, POWDER, LYOPHILIZED, FOR SOLUTION INTRAVENOUS at 11:43

## 2022-03-04 RX ADMIN — Medication 5 ML: at 11:03

## 2022-03-04 ASSESSMENT — PAIN SCALES - GENERAL: PAINLEVEL: NO PAIN (0)

## 2022-03-04 NOTE — PROGRESS NOTES
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 Feeling well without n/v/d. Eyes with sensation of eyelash in his eye, dry eyes. Hands had blister on palm after he ax-picked ice on their drive the other day. No blister today but hands are red around where blister was previously. No bleeding. Stools very firm, having some hemorrhoid bleeding, without significant pain.     Review of Systems                                                                                                                                         8 point Review of systems was o/w negative     PHYSICAL EXAM                                                                                                                                                 KPS: 80  Blood pressure 127/75, pulse 67, temperature 97.5  F (36.4  C), temperature source Tympanic, resp. rate 14, weight 103.6 kg (228 lb 4.8 oz), SpO2 (!) 14 %.    Wt Readings from Last 4 Encounters:   03/04/22 103.6 kg (228 lb 4.8 oz)   03/02/22 103.8 kg (228 lb 12.8 oz)   03/01/22 103.5 kg (228 lb 1.6 oz)   02/24/22 104.7 kg (230 lb 14.4 oz)      Lab Results   Component Value Date    WBC 11.2 (H) 03/04/2022    ANEU 3.5 10/26/2021    HGB 15.3 03/04/2022    HCT 45.7 03/04/2022     (L) 03/04/2022     03/04/2022    POTASSIUM 4.3 03/04/2022    CHLORIDE 109 03/04/2022    CO2 24 03/04/2022     (H) 03/04/2022    BUN 46 (H) 03/04/2022    CR 1.25 03/04/2022    MAG 2.0 02/21/2022    INR 0.96 02/07/2022    BILITOTAL 0.5 03/04/2022     (H) 03/04/2022     (H) 03/04/2022    ALKPHOS 162 (H) 03/04/2022    PROTTOTAL 7.1 03/04/2022    ALBUMIN 2.9 (L) 03/04/2022       General: NAD  HEENT: sclera anicteric. Sl conj erythema and tearing, less 3/1. Geographic tongue. No oral ulcers or erythema but mild lichenoid changes on both buccal mucosa  Lungs:  CTA b/l    no wheezes  CV: RRR  no gallop  Abd; soft no tenderness; BS nl  No big Hep or Spleen; no rebound.  Ext/ Skin: rash resolved on visible areas, back.  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 S/p allo sib PBSCT. (ABO matched)  HCT-CI score: 3 (prior solid tumor)  Day +100 bone marrow biopsy is 100% donor with no morphologic or flow cytometric evidence of leukemia BCR abl is pending.  - Day +180 restaging BM bx- still in CR  100% donor;  BCR Abl was missed- 2/16 pending from serum.     2.  HEME:   - Transfuse for hgb <7g/dL; plt < 10k.  No transfusions needs  -  pulmonary emboli: He developed a pulmonary emboli in the setting of receiving PEG asparaginase.   On Xarelto but because this was a provoked thrombus when the next phase of his treatment is settled; held for liver biopsy and was NOT resumed.    3.  FEN/Renal: Oral intake good/stable  - Cr stable  - He has a history of renal cancer and resection. has a single kidney.    4.    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 Use steroid topicals minimally and only on areas that are red/ pink. Skin resolved  - New chronic GVHD symptoms mouth and eyes. Injected, watering eyes; LFT; worse 3/1, started steroids. Finally improved 3/4  - 2/10 saw optho: mild GVHD of eyes- recommend Maxitrol to lids, continue refreshing drops QID.   - 2/7 Mouth: lichnoid changes on  soft palpate and bilateral buccal mucosa. Continue dex swish and spit TID,  Using tac ointment to lips BID.   - 2/16 Transaminitis worsening, again 2/21 and 2/24. 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 Now requested to stop any groin steroid cream 3/1  PFTs done 3/1    Consented 2/24 for PQRST observational study. As viral testing confirmed as negative:  3/1: started prednisone 1 mg/kg next week for 2 weeks; then taper over 2 weeks to 0.75 mg/kg/day and then taper over 2 weeks to 0.5 mg/kg/day and then taper over 4 weeks to 0.5 mg/kg every other day  Modified based on symptom and transaminase response. Could consider sooner taper next week if LFTs continue to normalize so rapidly. No rash, no n/v/d.    5.  ID: Afebrile  Otitis media 3/1: left ear is red, injected and has pocket of collected fluid (appears intact). Take levaquin 500mg daily x 7 days.  - prophy acv (letrmovir stopped at day +100), resume levaquin prophy after course of levaquin for ear infection. Micafungin for antifungal coverage while on pred- Viral panel taken (elevated LFTs) EBV to 5k, will monitor however this does not explain extent of liver dysfunction.  - Pentamidine 2/23. (it was due 2/10).   - CMV neg 3/1, EBV pending  - influenza vacc given 10/26.  Covid vaccine given November 18- given second dose 12/14     Pt given info on [facility]HELD    6. Endo: Hx of graves disease; On synthroid 175 mcg daily     7. CV: Norvasc: Decrease to 2.5mg 2/21. BP stable despite starting steroids.    8. GI: no n/v/d  Omeprazole for heartburn  LFTs as above    9. Psych: more anxiety - harder to manage as limited activity (would typically kind of work off stem but  cant do that with weather/restrictions post transplant  - Situational anxiety - start lexapro 10mg daily. Also difficult sleeping refilled ativan 1mg at bedtime prn  As halcion was not helpful.  Discussed would not recommend ativan for sleep long term. Hopeful lexapro will help with anxiety and therefore sleep.   - if not consider true sleep aid. Unisom? Trazadone was not effective. Monitor while on steroids      Plan: Cont steroids unchanged this week, then consider taper starting next Wednesday (instead of two weeks)  Micafungin given today  hospitals confirmed they have order, should be able to deliver by Monday  Consider sooner follow up with ophthalmology for dry/ foreign object sensation in eyes  Called pt's wife to discuss plan and back-up plan for clinic visit if no micafungin through hospitals (she will call Monday morning if no word from them)    RTC Wednesday for provider visit with labs, aung Sahni PAC  [phone]

## 2022-03-04 NOTE — PROGRESS NOTES
Infusion Nursing Note:  Nik Nava presents today for scheduled micafungin infusion.    Patient seen by provider today: Yes: Layla Sahni GIRISH   present during visit today: Not Applicable.    Note: Labs monitored. VSS. Pt assessed by provider.      Intravenous Access:  Implanted Port.    Treatment Conditions:  Pt received 300 mg Micafungin infusion.      Post Infusion Assessment:  Patient tolerated infusion without incident.       Discharge Plan:   Patient discharged in stable condition accompanied by: self.      Ghislaine Collier RN

## 2022-03-04 NOTE — LETTER
3/4/2022         RE: Nik Nava  [address] [facility] [address]        Dear Colleague,    Thank you for referring your patient, Nik Nava, to the [facility] BLOOD AND MARROW TRANSPLANT PROGRAM [place]. Please see a copy of my visit note below.      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 Feeling well without n/v/d. Eyes with sensation of eyelash in his eye, dry eyes. Hands had blister on palm after he ax-picked ice on their drive the other day. No blister today but hands are red around where blister was previously. No bleeding. Stools very firm, having some hemorrhoid bleeding, without significant pain.     Review of Systems                                                                                                                                         8 point Review of systems was o/w negative     PHYSICAL EXAM                                                                                                                                                 KPS: 80  Blood pressure 127/75, pulse 67, temperature 97.5  F (36.4  C), temperature source Tympanic, resp. rate 14, weight 103.6 kg (228 lb 4.8 oz), SpO2 (!) 14 %.    Wt Readings from Last 4 Encounters:   03/04/22 103.6 kg (228 lb 4.8 oz)   03/02/22 103.8 kg (228 lb 12.8 oz)   03/01/22 103.5 kg (228 lb 1.6 oz)   02/24/22 104.7 kg (230 lb 14.4 oz)      Lab Results   Component Value Date    WBC 11.2 (H) 03/04/2022    ANEU 3.5 10/26/2021    HGB 15.3 03/04/2022    HCT 45.7 03/04/2022     (L) 03/04/2022     03/04/2022    POTASSIUM 4.3 03/04/2022    CHLORIDE 109 03/04/2022    CO2 24 03/04/2022     (H) 03/04/2022    BUN 46 (H) 03/04/2022    CR 1.25 03/04/2022    MAG 2.0 02/21/2022    INR 0.96 02/07/2022    BILITOTAL 0.5 03/04/2022     (H) 03/04/2022     (H) 03/04/2022     ALKPHOS 162 (H) 03/04/2022    PROTTOTAL 7.1 03/04/2022    ALBUMIN 2.9 (L) 03/04/2022       General: NAD  HEENT: sclera anicteric. Sl conj erythema and tearing, less 3/1. Geographic tongue. No oral ulcers or erythema but mild lichenoid changes on both buccal mucosa  Lungs:  CTA b/l   no wheezes  CV: RRR  no gallop  Abd; soft no tenderness; BS nl  No big Hep or Spleen; no rebound.  Ext/ Skin: rash resolved on visible areas, back.  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 S/p allo sib PBSCT. (ABO matched)  HCT-CI score: 3 (prior solid tumor)  Day +100 bone marrow biopsy is 100% donor with no morphologic or flow cytometric evidence of leukemia BCR abl is pending.  - Day +180 restaging BM bx- still in CR  100% donor;  BCR Abl was missed- 2/16 pending from serum.     2.  HEME:   - Transfuse for hgb <7g/dL; plt < 10k.  No transfusions needs  -  pulmonary emboli: He developed a pulmonary emboli in the setting of receiving PEG asparaginase.   On Xarelto but because this was a provoked thrombus when the next phase of his treatment is settled; held for liver biopsy and was NOT resumed.    3.  FEN/Renal: Oral intake good/stable  - Cr stable  - He has a history of renal cancer and resection. has a single kidney.    4.    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 Use steroid topicals minimally and only on areas that  are red/ pink. Skin resolved  - New chronic GVHD symptoms mouth and eyes. Injected, watering eyes; LFT; worse 3/1, started steroids. Finally improved 3/4  - 2/10 saw optho: mild GVHD of eyes- recommend Maxitrol to lids, continue refreshing drops QID.   - 2/7 Mouth: lichnoid changes on soft palpate and bilateral buccal mucosa. Continue dex swish and spit TID,  Using tac ointment to lips BID.   - 2/16 Transaminitis worsening, again 2/21 and 2/24. 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 Now requested to stop any groin steroid cream 3/1  PFTs done 3/1    Consented 2/24 for PQRST observational study. As viral testing confirmed as negative:  3/1: started prednisone 1 mg/kg next week for 2 weeks; then taper over 2 weeks to 0.75 mg/kg/day and then taper over 2 weeks to 0.5 mg/kg/day and then taper over 4 weeks to 0.5 mg/kg every other day  Modified based on symptom and transaminase response. Could consider sooner taper next week if LFTs continue to normalize so rapidly. No rash, no n/v/d.    5.  ID: Afebrile  Otitis media 3/1: left ear is red, injected and has pocket of collected fluid (appears intact). Take levaquin 500mg daily x 7 days.  - prophy acv (letrmovir stopped at day +100), resume levaquin prophy after course of levaquin for ear infection. Micafungin for antifungal coverage while on pred- Viral panel taken (elevated LFTs) EBV to 5k, will monitor however this does not explain extent of liver dysfunction.  - Pentamidine 2/23. (it was due 2/10).   - CMV neg 3/1, EBV pending  - influenza vacc given 10/26.  Covid vaccine given November 18- given second dose 12/14     Pt given info on EVUSHELD    6. Endo: Hx of graves  disease; On synthroid 175 mcg daily     7. CV: Norvasc: Decrease to 2.5mg 2/21. BP stable despite starting steroids.    8. GI: no n/v/d  Omeprazole for heartburn  LFTs as above    9. Psych: more anxiety - harder to manage as limited activity (would typically kind of work off stem but cant do that with weather/restrictions post transplant  - Situational anxiety - start lexapro 10mg daily. Also difficult sleeping refilled ativan 1mg at bedtime prn  As halcion was not helpful.  Discussed would not recommend ativan for sleep long term. Hopeful lexapro will help with anxiety and therefore sleep.   - if not consider true sleep aid. Unisom? Trazadone was not effective. Monitor while on steroids      Plan: Cont steroids unchanged this week, then consider taper starting next Wednesday (instead of two weeks)  Micafungin given today  [facility] confirmed they have order, should be able to deliver by Monday  Consider sooner follow up with ophthalmology for dry/ foreign object sensation in eyes  Called pt's wife to discuss plan and back-up plan for clinic visit if no micafungin through [facility] (she will call Monday morning if no word from them)    RTC Wednesday for provider visit with labs, aung Sahni PAC  [phone]          Again, thank you for allowing me to participate in the care of your patient.      Sincerely,    BMT Advanced Practice Provider

## 2022-03-04 NOTE — NURSING NOTE
"Oncology Rooming Note    March 4, 2022 11:49 AM   Nik Nava is a 61 year old male who presents for:    Chief Complaint   Patient presents with     Oncology Clinic Visit     Acute lymphoblastic leukemia      Port Draw     Labs drawn via port by RN in lab. VS taken.      Initial Vitals: /75   Pulse 67   Temp 97.5  F (36.4  C) (Tympanic)   Resp 14   Wt 103.6 kg (228 lb 4.8 oz)   SpO2 (!) 14%   BMI 30.13 kg/m   Estimated body mass index is 30.13 kg/m  as calculated from the following:    Height as of 2/24/22: 1.854 m (6' 0.99\").    Weight as of this encounter: 103.6 kg (228 lb 4.8 oz). Body surface area is 2.31 meters squared.  No Pain (0) Comment: Data Unavailable   No LMP for male patient.  Allergies reviewed: Yes  Medications reviewed: Yes    Medications: Medication refills not needed today.  Pharmacy name entered into [employer]:    [facility] - [place], [place] - [address] [facility] [place], [place] - [address] [facility] [phone]    Clinical concerns: None. VSS. Pt denies pain, fevers, respiratory symptoms, nausea, vomiting, or diarrhea. Pt reports feeling well today.      Ghislaine Collier RN              "

## 2022-03-04 NOTE — LETTER
3/4/2022         RE: Nik Nava  [address] [facility] [address]        Dear Colleague,    Thank you for referring your patient, Nik Nava, to the [facility] BLOOD AND MARROW TRANSPLANT PROGRAM [place]. Please see a copy of my visit note below.    Infusion Nursing Note:  Nik Nava presents today for scheduled micafungin infusion.    Patient seen by provider today: Yes: Layla Sahni GIRISH   present during visit today: Not Applicable.    Note: Labs monitored. VSS. Pt assessed by provider.    Intravenous Access:  Implanted Port.    Treatment Conditions:  Pt received 300 mg Micafungin infusion.    Post Infusion Assessment:  Patient tolerated infusion without incident.     Discharge Plan:   Patient discharged in stable condition accompanied by: self.    Ghislaine Collier RN        Again, thank you for allowing me to participate in the care of your patient.      Sincerely,    [facility]

## 2022-03-04 NOTE — NURSING NOTE
Chief Complaint   Patient presents with     Oncology Clinic Visit     Acute lymphoblastic leukemia      Port Draw     Labs drawn via port by RN in lab. VS taken.      Emiliana Weinstein RN

## 2022-03-06 ENCOUNTER — HOME INFUSION (PRE-WILLOW HOME INFUSION) (OUTPATIENT)
Dept: PHARMACY | Facility: CLINIC | Age: 62
End: 2022-03-06
Payer: COMMERCIAL

## 2022-03-07 NOTE — PROGRESS NOTES
This is a recent snapshot of the patient's [facility] medical record.  For current drug dose and complete information and questions, call [phone] or In Basket pool, fv home infusion ([id number])  CSN Number:  [id number]

## 2022-03-09 ENCOUNTER — ONCOLOGY VISIT (OUTPATIENT)
Dept: TRANSPLANT | Facility: CLINIC | Age: 62
End: 2022-03-09
Attending: STUDENT IN AN ORGANIZED HEALTH CARE EDUCATION/TRAINING PROGRAM
Payer: COMMERCIAL

## 2022-03-09 ENCOUNTER — APPOINTMENT (OUTPATIENT)
Dept: LAB | Facility: CLINIC | Age: 62
End: 2022-03-09
Attending: STUDENT IN AN ORGANIZED HEALTH CARE EDUCATION/TRAINING PROGRAM
Payer: COMMERCIAL

## 2022-03-09 VITALS
WEIGHT: 227 LBS | OXYGEN SATURATION: 98 % | HEART RATE: 66 BPM | SYSTOLIC BLOOD PRESSURE: 133 MMHG | BODY MASS INDEX: 29.96 KG/M2 | TEMPERATURE: 97.4 F | DIASTOLIC BLOOD PRESSURE: 78 MMHG | RESPIRATION RATE: 16 BRPM

## 2022-03-09 DIAGNOSIS — Z94.81 STATUS POST BONE MARROW TRANSPLANT (H): Primary | ICD-10-CM

## 2022-03-09 DIAGNOSIS — D89.813 GVHD AS COMPLICATION OF BONE MARROW TRANSPLANT (H): ICD-10-CM

## 2022-03-09 DIAGNOSIS — Z94.81 STATUS POST BONE MARROW TRANSPLANT (H): ICD-10-CM

## 2022-03-09 DIAGNOSIS — C91.01 ACUTE LYMPHOBLASTIC LEUKEMIA (ALL) IN REMISSION (H): Primary | ICD-10-CM

## 2022-03-09 DIAGNOSIS — C91.01 ACUTE LYMPHOBLASTIC LEUKEMIA (ALL) IN REMISSION (H): ICD-10-CM

## 2022-03-09 DIAGNOSIS — T86.09 GVHD AS COMPLICATION OF BONE MARROW TRANSPLANT (H): ICD-10-CM

## 2022-03-09 LAB
ALBUMIN SERPL-MCNC: 2.9 G/DL (ref 3.4–5)
ALP SERPL-CCNC: 133 U/L (ref 40–150)
ALT SERPL W P-5'-P-CCNC: 183 U/L (ref 0–70)
ANION GAP SERPL CALCULATED.3IONS-SCNC: 7 MMOL/L (ref 3–14)
AST SERPL W P-5'-P-CCNC: 106 U/L (ref 0–45)
BASOPHILS # BLD AUTO: 0 10E3/UL (ref 0–0.2)
BASOPHILS NFR BLD AUTO: 0 %
BILIRUB SERPL-MCNC: 0.8 MG/DL (ref 0.2–1.3)
BUN SERPL-MCNC: 46 MG/DL (ref 7–30)
CALCIUM SERPL-MCNC: 8.9 MG/DL (ref 8.5–10.1)
CHLORIDE BLD-SCNC: 105 MMOL/L (ref 94–109)
CO2 SERPL-SCNC: 24 MMOL/L (ref 20–32)
CREAT SERPL-MCNC: 1.14 MG/DL (ref 0.66–1.25)
EOSINOPHIL # BLD AUTO: 0 10E3/UL (ref 0–0.7)
EOSINOPHIL NFR BLD AUTO: 0 %
ERYTHROCYTE [DISTWIDTH] IN BLOOD BY AUTOMATED COUNT: 18.4 % (ref 10–15)
GFR SERPL CREATININE-BSD FRML MDRD: 72 ML/MIN/1.73M2
GLUCOSE BLD-MCNC: 117 MG/DL (ref 70–99)
HCT VFR BLD AUTO: 48.3 % (ref 40–53)
HGB BLD-MCNC: 16.1 G/DL (ref 13.3–17.7)
IMM GRANULOCYTES # BLD: 0.1 10E3/UL
IMM GRANULOCYTES NFR BLD: 1 %
LYMPHOCYTES # BLD AUTO: 1.7 10E3/UL (ref 0.8–5.3)
LYMPHOCYTES NFR BLD AUTO: 23 %
MAGNESIUM SERPL-MCNC: 2 MG/DL (ref 1.6–2.3)
MCH RBC QN AUTO: 30.8 PG (ref 26.5–33)
MCHC RBC AUTO-ENTMCNC: 33.3 G/DL (ref 31.5–36.5)
MCV RBC AUTO: 93 FL (ref 78–100)
MONOCYTES # BLD AUTO: 0.7 10E3/UL (ref 0–1.3)
MONOCYTES NFR BLD AUTO: 10 %
NEUTROPHILS # BLD AUTO: 4.8 10E3/UL (ref 1.6–8.3)
NEUTROPHILS NFR BLD AUTO: 66 %
NRBC # BLD AUTO: 0 10E3/UL
NRBC BLD AUTO-RTO: 0 /100
PLATELET # BLD AUTO: 129 10E3/UL (ref 150–450)
POTASSIUM BLD-SCNC: 4.5 MMOL/L (ref 3.4–5.3)
PROT SERPL-MCNC: 6.8 G/DL (ref 6.8–8.8)
RBC # BLD AUTO: 5.22 10E6/UL (ref 4.4–5.9)
SODIUM SERPL-SCNC: 136 MMOL/L (ref 133–144)
WBC # BLD AUTO: 7.3 10E3/UL (ref 4–11)

## 2022-03-09 PROCEDURE — 83735 ASSAY OF MAGNESIUM: CPT

## 2022-03-09 PROCEDURE — G0463 HOSPITAL OUTPT CLINIC VISIT: HCPCS

## 2022-03-09 PROCEDURE — 94640 AIRWAY INHALATION TREATMENT: CPT | Performed by: INTERNAL MEDICINE

## 2022-03-09 PROCEDURE — 258N000003 HC RX IP 258 OP 636: Performed by: STUDENT IN AN ORGANIZED HEALTH CARE EDUCATION/TRAINING PROGRAM

## 2022-03-09 PROCEDURE — 250N000011 HC RX IP 250 OP 636: Performed by: STUDENT IN AN ORGANIZED HEALTH CARE EDUCATION/TRAINING PROGRAM

## 2022-03-09 PROCEDURE — 99214 OFFICE O/P EST MOD 30 MIN: CPT

## 2022-03-09 PROCEDURE — 80053 COMPREHEN METABOLIC PANEL: CPT

## 2022-03-09 PROCEDURE — 94642 AEROSOL INHALATION TREATMENT: CPT | Performed by: INTERNAL MEDICINE

## 2022-03-09 PROCEDURE — 36591 DRAW BLOOD OFF VENOUS DEVICE: CPT

## 2022-03-09 PROCEDURE — 85025 COMPLETE CBC W/AUTO DIFF WBC: CPT

## 2022-03-09 RX ORDER — PREDNISONE 50 MG/1
50 TABLET ORAL DAILY
Qty: 28 TABLET | Refills: 0 | Status: SHIPPED | OUTPATIENT
Start: 2022-03-09 | End: 2022-03-09

## 2022-03-09 RX ORDER — PENTAMIDINE ISETHIONATE 300 MG/300MG
300 INHALANT RESPIRATORY (INHALATION)
Status: DISCONTINUED | OUTPATIENT
Start: 2022-03-09 | End: 2022-03-09 | Stop reason: HOSPADM

## 2022-03-09 RX ORDER — ALBUTEROL SULFATE 5 MG/ML
2.5 SOLUTION RESPIRATORY (INHALATION) EVERY 6 HOURS PRN
Status: CANCELLED
Start: 2022-04-07

## 2022-03-09 RX ORDER — HEPARIN SODIUM (PORCINE) LOCK FLUSH IV SOLN 100 UNIT/ML 100 UNIT/ML
5 SOLUTION INTRAVENOUS
Status: CANCELLED | OUTPATIENT
Start: 2022-04-07

## 2022-03-09 RX ORDER — HEPARIN SODIUM (PORCINE) LOCK FLUSH IV SOLN 100 UNIT/ML 100 UNIT/ML
5 SOLUTION INTRAVENOUS EVERY 8 HOURS PRN
Status: DISCONTINUED | OUTPATIENT
Start: 2022-03-09 | End: 2022-03-09 | Stop reason: HOSPADM

## 2022-03-09 RX ORDER — ALBUTEROL SULFATE 5 MG/ML
2.5 SOLUTION RESPIRATORY (INHALATION) EVERY 6 HOURS PRN
Status: DISCONTINUED | OUTPATIENT
Start: 2022-03-09 | End: 2022-03-09 | Stop reason: HOSPADM

## 2022-03-09 RX ORDER — PREDNISONE 50 MG/1
75 TABLET ORAL DAILY
Qty: 45 TABLET | Refills: 0 | Status: SHIPPED | OUTPATIENT
Start: 2022-03-09 | End: 2022-03-31

## 2022-03-09 RX ORDER — PENTAMIDINE ISETHIONATE 300 MG/300MG
300 INHALANT RESPIRATORY (INHALATION)
Status: CANCELLED
Start: 2022-04-07

## 2022-03-09 RX ORDER — HEPARIN SODIUM,PORCINE 10 UNIT/ML
5 VIAL (ML) INTRAVENOUS
Status: CANCELLED | OUTPATIENT
Start: 2022-04-07

## 2022-03-09 RX ORDER — LEVOFLOXACIN 250 MG/1
250 TABLET, FILM COATED ORAL DAILY
Qty: 30 TABLET | Refills: 1 | Status: SHIPPED | OUTPATIENT
Start: 2022-03-09 | End: 2022-04-15

## 2022-03-09 RX ORDER — DEXAMETHASONE 0.5 MG/5ML
2 SOLUTION ORAL 4 TIMES DAILY
Qty: 1000 ML | Refills: 3 | Status: SHIPPED | OUTPATIENT
Start: 2022-03-09 | End: 2022-07-26

## 2022-03-09 RX ADMIN — Medication 5 ML: at 11:26

## 2022-03-09 RX ADMIN — SODIUM CHLORIDE 300 MG: 9 INJECTION, SOLUTION INTRAVENOUS at 12:37

## 2022-03-09 RX ADMIN — PENTAMIDINE ISETHIONATE 300 MG: 300 INHALANT RESPIRATORY (INHALATION) at 13:52

## 2022-03-09 ASSESSMENT — PAIN SCALES - GENERAL: PAINLEVEL: NO PAIN (0)

## 2022-03-09 NOTE — PROGRESS NOTES
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 Feeling well without n/v/d. Stools less firm with senna, no loose stools. Received IV micafungin for home infusion Monday without issue. Ear pain has nearly resolved on levaquin. He notes the sensation of a small speck in his eyes are limited to right eye now, however the sensation is less pronounced when he does not take his drops. No vision changes. Mouth is dry, however significantly better than before steroids. No rash. He notes he feels more flat and without energy or opinion on things since starting lexapro. Knows the month trial is ideal, would be next week to evaluate.    Review of Systems                                                                                                                                         8 point Review of systems was o/w negative     PHYSICAL EXAM                                                                                                                                                 KPS: 80  Blood pressure 133/78, pulse 66, temperature 97.4  F (36.3  C), temperature source Oral, resp. rate 16, weight 103 kg (227 lb), SpO2 98 %.    Wt Readings from Last 4 Encounters:   03/09/22 103 kg (227 lb)   03/04/22 103.6 kg (228 lb 4.8 oz)   03/02/22 103.8 kg (228 lb 12.8 oz)   03/01/22 103.5 kg (228 lb 1.6 oz)      Lab Results   Component Value Date    WBC 7.3 03/09/2022    ANEU 3.5 10/26/2021    HGB 16.1 03/09/2022    HCT 48.3 03/09/2022     (L) 03/09/2022     03/09/2022    POTASSIUM 4.5 03/09/2022    CHLORIDE 105 03/09/2022    CO2 24 03/09/2022     (H) 03/09/2022    BUN 46 (H) 03/09/2022    CR 1.14 03/09/2022    MAG 2.0 03/09/2022    INR 0.96 02/07/2022    BILITOTAL 0.8 03/09/2022     (H) 03/09/2022     (H) 03/09/2022    ALKPHOS 133 03/09/2022    PROTTOTAL 6.8 03/09/2022     ALBUMIN 2.9 (L) 03/09/2022       General: NAD  HEENT: sclera anicteric. Sl conj erythema and tearing, less 3/1. Geographic tongue. No oral ulcers or erythema but mild lichenoid changes on both buccal mucosa  Lungs:  CTA b/l   no wheezes  CV: RRR  no gallop  Abd; soft no tenderness; BS nl  No big Hep or Spleen; no rebound.  Ext/ Skin: rash resolved on visible areas, back.  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 S/p allo sib PBSCT. (ABO matched)  HCT-CI score: 3 (prior solid tumor)  Day +100 bone marrow biopsy is 100% donor with no morphologic or flow cytometric evidence of leukemia BCR abl is pending.  - Day +180 restaging BM bx- still in CR  100% donor;  BCR Abl was missed- 2/16 pending from serum.     2.  HEME:   - Transfuse for hgb <7g/dL; plt < 10k.  No transfusions needs  -  pulmonary emboli: He developed a pulmonary emboli in the setting of receiving PEG asparaginase.   On Xarelto but because this was a provoked thrombus when the next phase of his treatment is settled; held for liver biopsy and was NOT resumed.    3.  FEN/Renal: Oral intake good/stable  - Cr stable  - He has a history of renal cancer and resection. has a single kidney.    4.    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 Use steroid topicals minimally and only on areas that are red/ pink. Skin resolved  - New chronic GVHD symptoms  mouth and eyes. Injected, watering eyes; LFT; worse 3/1, started steroids. Finally improved 3/4  - 2/10 saw optho: mild GVHD of eyes- recommend Maxitrol to lids, continue refreshing drops QID.   - 2/7 Mouth: lichnoid changes on soft palpate and bilateral buccal mucosa. Continue dex swish and spit TID,  Using tac ointment to lips BID.   - 2/16 Transaminitis worsening, again 2/21 and 2/24. 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 Now requested to stop any groin steroid cream 3/1  PFTs done 3/1    Consented 2/24 for PQRST observational study. As viral testing confirmed as negative:  3/1: started prednisone 1 mg/kg next week for 2 weeks; then taper over 2 weeks to 0.75 mg/kg/day and then taper over 2 weeks to 0.5 mg/kg/day and then taper over 4 weeks to 0.5 mg/kg every other day  Modified based on symptom and transaminase response. Next taper 3/15, patient has instructions to decrease to 75mg/day    5.  ID: Afebrile  Otitis media 3/1: left ear is red, injected and has pocket of collected fluid (appears intact). Take levaquin 500mg daily x 7 days.  - prophy acv (letrmovir stopped at day +100), resume levaquin prophy after course of levaquin for ear infection. Micafungin for antifungal coverage while on pred- Viral panel taken (elevated LFTs) EBV to 5k, will monitor however this does not explain extent of liver dysfunction.  - Pentamidine 2/10, today 3/9  - CMV neg 3/1, EBV positive, however staying <10,000 since initial positive 2/2022  - influenza vacc given 10/26.  S/p covid vaccine series 12/2021  Pt given info on EVUSHELD    6. Endo: Hx of graves disease; On synthroid 175 mcg daily     7. CV:  Norvasc: Decrease to 2.5mg 2/21. BP stable despite starting steroids.    8. GI: no n/v/d  Omeprazole for heartburn  LFTs as above  Suggested senna 3/7 for constipation    9. Psych: more anxiety - harder to manage as limited activity (would typically use work as good distraction but cant do that with weather/restrictions post transplant)  - Situational anxiety - start lexapro 10mg daily. Also difficult sleeping refilled ativan 1mg at bedtime prn  As halcion was not helpful.  Discussed would not recommend ativan for sleep long term. Hopeful lexapro will help with anxiety and therefore sleep. Feels more flat and without energy or opinion on things since starting lexapro. Knows the month trial is ideal, would be next week to evaluate.    - if not consider true sleep aid. Unisom? Trazadone was not effective. Monitor while on steroids, ok per pt 3/9      Plan: Cont steroids unchanged this week, next taper starts 3/15  Micafungin given today, continue M/F with [facility]  Pt will call [facility] re sooner visit (currently scheduled in May)    RTC Wednesday for provider visit with labs    Layla Sahni PAC  [phone]

## 2022-03-09 NOTE — LETTER
3/9/2022         RE: Nik Nava  [address] [facility] [address]        Dear Colleague,    Thank you for referring your patient, Nik Nava, to the [facility] BLOOD AND MARROW TRANSPLANT PROGRAM [place]. Please see a copy of my visit note below.    Infusion Nursing Note:  Nik Nava presents today for scheduled Micafungin.   Patient seen by provider today: Yes: Layla Sahni   present during visit today: Not Applicable.    Note: Lab results monitored; no treatment parameters. Micafungin infused over 1 hour.    Intravenous Access:  Implanted Port.  Port kept accessed per patient request for home infusion.    Treatment Conditions:  Not Applicable.    Post Infusion Assessment:  Patient tolerated infusion without incident.     Discharge Plan:   Patient discharged in stable condition accompanied by: self.    Catherine Chavez RN      Again, thank you for allowing me to participate in the care of your patient.      Sincerely,    [facility]

## 2022-03-09 NOTE — NURSING NOTE
"Oncology Rooming Note    March 9, 2022 11:57 AM   Nik Nava is a 61 year old male who presents for:    Chief Complaint   Patient presents with     Port Draw     labs drawn via PORT by RN in lab. VS taken.      Oncology Clinic Visit     Status post bone marrow transplant (H)     Initial Vitals: /78   Pulse 66   Temp 97.4  F (36.3  C) (Oral)   Resp 16   Wt 103 kg (227 lb)   SpO2 98%   BMI 29.96 kg/m   Estimated body mass index is 29.96 kg/m  as calculated from the following:    Height as of 2/24/22: 1.854 m (6' 0.99\").    Weight as of this encounter: 103 kg (227 lb). Body surface area is 2.3 meters squared.  No Pain (0) Comment: Data Unavailable   No LMP for male patient.  Allergies reviewed: Yes  Medications reviewed: Yes    Medications: MEDICATION REFILLS NEEDED TODAY. Provider was notified.  Pharmacy name entered into [employer]:    [facility] - [place], [place] - [address] [facility] [place], [place] -  [facility] [phone]    Clinical concerns: Patient reports ongoing right eye discomfort-feels like a hair is in his eye. Patient has questions about Prednisone and what medications he will continue-please advise/discuss.        Missy Waterman LPN March 9, 2022 12:00 PM                "

## 2022-03-09 NOTE — LETTER
3/9/2022         RE: Nik Nava  [address] [facility] [address]        Dear Colleague,    Thank you for referring your patient, Nik Nava, to the [facility] BLOOD AND MARROW TRANSPLANT PROGRAM [place]. Please see a copy of my visit note below.      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 Feeling well without n/v/d. Stools less firm with senna, no loose stools. Received IV micafungin for home infusion Monday without issue. Ear pain has nearly resolved on levaquin. He notes the sensation of a small speck in his eyes are limited to right eye now, however the sensation is less pronounced when he does not take his drops. No vision changes. Mouth is dry, however significantly better than before steroids. No rash. He notes he feels more flat and without energy or opinion on things since starting lexapro. Knows the month trial is ideal, would be next week to evaluate.    Review of Systems                                                                                                                                         8 point Review of systems was o/w negative     PHYSICAL EXAM                                                                                                                                                 KPS: 80  Blood pressure 133/78, pulse 66, temperature 97.4  F (36.3  C), temperature source Oral, resp. rate 16, weight 103 kg (227 lb), SpO2 98 %.    Wt Readings from Last 4 Encounters:   03/09/22 103 kg (227 lb)   03/04/22 103.6 kg (228 lb 4.8 oz)   03/02/22 103.8 kg (228 lb 12.8 oz)   03/01/22 103.5 kg (228 lb 1.6 oz)      Lab Results   Component Value Date    WBC 7.3 03/09/2022    ANEU 3.5 10/26/2021    HGB 16.1 03/09/2022    HCT 48.3 03/09/2022     (L) 03/09/2022     03/09/2022    POTASSIUM 4.5 03/09/2022    CHLORIDE 105  03/09/2022    CO2 24 03/09/2022     (H) 03/09/2022    BUN 46 (H) 03/09/2022    CR 1.14 03/09/2022    MAG 2.0 03/09/2022    INR 0.96 02/07/2022    BILITOTAL 0.8 03/09/2022     (H) 03/09/2022     (H) 03/09/2022    ALKPHOS 133 03/09/2022    PROTTOTAL 6.8 03/09/2022    ALBUMIN 2.9 (L) 03/09/2022       General: NAD  HEENT: sclera anicteric. Sl conj erythema and tearing, less 3/1. Geographic tongue. No oral ulcers or erythema but mild lichenoid changes on both buccal mucosa  Lungs:  CTA b/l   no wheezes  CV: RRR  no gallop  Abd; soft no tenderness; BS nl  No big Hep or Spleen; no rebound.  Ext/ Skin: rash resolved on visible areas, back.  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 S/p allo sib PBSCT. (ABO matched)  HCT-CI score: 3 (prior solid tumor)  Day +100 bone marrow biopsy is 100% donor with no morphologic or flow cytometric evidence of leukemia BCR abl is pending.  - Day +180 restaging BM bx- still in CR  100% donor;  BCR Abl was missed- 2/16 pending from serum.     2.  HEME:   - Transfuse for hgb <7g/dL; plt < 10k.  No transfusions needs  -  pulmonary emboli: He developed a pulmonary emboli in the setting of receiving PEG asparaginase.   On Xarelto but because this was a provoked thrombus when the next phase of his treatment is settled; held for liver biopsy and was NOT resumed.    3.  FEN/Renal: Oral intake good/stable  - Cr stable  - He has a history of renal cancer and resection. has a single kidney.    4.    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 Use steroid topicals minimally and only on areas that are red/ pink. Skin resolved  - New chronic GVHD symptoms mouth and eyes. Injected, watering eyes; LFT; worse 3/1, started steroids. Finally improved 3/4  - 2/10 saw optho: mild GVHD of eyes- recommend Maxitrol to lids, continue refreshing drops QID.   - 2/7 Mouth: lichnoid changes on soft palpate and bilateral buccal mucosa. Continue dex swish and spit TID,  Using tac ointment to lips BID.   - 2/16 Transaminitis worsening, again 2/21 and 2/24. 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 Now requested to stop any groin steroid cream 3/1  PFTs done 3/1    Consented 2/24 for PQRST observational study. As viral testing confirmed as negative:  3/1: started prednisone 1 mg/kg next week for 2 weeks; then taper over 2 weeks to 0.75 mg/kg/day and then taper over 2 weeks to 0.5 mg/kg/day and then taper over 4 weeks to 0.5 mg/kg every other day  Modified based on symptom and transaminase response. Next taper 3/15, patient has instructions to decrease to 75mg/day    5.  ID: Afebrile  Otitis media 3/1: left ear is red, injected and has pocket of collected fluid (appears intact). Take levaquin 500mg daily x 7 days.  - prophy acv (letrmovir stopped at day +100), resume levaquin prophy after course of levaquin for ear infection. Micafungin for antifungal coverage while on pred- Viral panel taken (elevated LFTs) EBV to 5k, will monitor however this does not explain extent of liver  dysfunction.  - Pentamidine 2/10, today 3/9  - CMV neg 3/1, EBV positive, however staying <10,000 since initial positive 2/2022  - influenza vacc given 10/26.  S/p covid vaccine series 12/2021  Pt given info on EVUSHELD    6. Endo: Hx of graves disease; On synthroid 175 mcg daily     7. CV: Norvasc: Decrease to 2.5mg 2/21. BP stable despite starting steroids.    8. GI: no n/v/d  Omeprazole for heartburn  LFTs as above  Suggested senna 3/7 for constipation    9. Psych: more anxiety - harder to manage as limited activity (would typically use work as good distraction but cant do that with weather/restrictions post transplant)  - Situational anxiety - start lexapro 10mg daily. Also difficult sleeping refilled ativan 1mg at bedtime prn  As halcion was not helpful.  Discussed would not recommend ativan for sleep long term. Hopeful lexapro will help with anxiety and therefore sleep. Feels more flat and without energy or opinion on things since starting lexapro. Knows the month trial is ideal, would be next week to evaluate.    - if not consider true sleep aid. Unisom? Trazadone was not effective. Monitor while on steroids, ok per pt 3/9    Plan: Cont steroids unchanged this week, next taper starts 3/15  Micafungin given today, continue M/F with FHI  Pt will call opho re sooner visit (currently scheduled in May)    RTC Wednesday for provider visit with labs    Layla Sahni PAC  [phone]        Again, thank you for allowing me to participate in the care of your patient.      Sincerely,    BMT Advanced Practice Provider

## 2022-03-09 NOTE — PROGRESS NOTES
Infusion Nursing Note:  Nik Nava presents today for scheduled Micafungin.   Patient seen by provider today: Yes: Layla Sahni   present during visit today: Not Applicable.    Note: Lab results monitored; no treatment parameters. Micafungin infused over 1 hour.    Intravenous Access:  Implanted Port.  Port kept accessed per patient request for home infusion.    Treatment Conditions:  Not Applicable.      Post Infusion Assessment:  Patient tolerated infusion without incident.       Discharge Plan:   Patient discharged in stable condition accompanied by: self.      Catherine Chavez RN

## 2022-03-09 NOTE — PROGRESS NOTES
Patient was seen today for a Pentamidine nebulizer tx ordered by Dr. Gay.    Patient was first given 2.5 mg/3ml of albuterol nebulizer, after which Pentamidine 300 mg (Lot # [id number]) mixed with 6cc Sterile H20 was administered through a filtered nebulizer.    Pre-treatment: SpO2 = 97%   HR = 80   BBS = clear   Post-treatment: SpO2 = 97%  HR = 63  BBS = clear    No adverse side effects noted by the patient.    This service today was provided under the direct supervision of Dr. Oden, who was available if needed.     Procedure was completed by AAMIR MCGRAW.

## 2022-03-10 ENCOUNTER — HOME INFUSION (PRE-WILLOW HOME INFUSION) (OUTPATIENT)
Dept: PHARMACY | Facility: CLINIC | Age: 62
End: 2022-03-10

## 2022-03-10 LAB
CMV DNA SPEC NAA+PROBE-ACNC: <137 IU/ML
CMV DNA SPEC NAA+PROBE-LOG#: <2.1 {LOG_COPIES}/ML

## 2022-03-16 ENCOUNTER — APPOINTMENT (OUTPATIENT)
Dept: LAB | Facility: CLINIC | Age: 62
End: 2022-03-16
Attending: INTERNAL MEDICINE
Payer: COMMERCIAL

## 2022-03-16 ENCOUNTER — ONCOLOGY VISIT (OUTPATIENT)
Dept: TRANSPLANT | Facility: CLINIC | Age: 62
End: 2022-03-16
Attending: PHYSICIAN ASSISTANT
Payer: COMMERCIAL

## 2022-03-16 VITALS
BODY MASS INDEX: 30.3 KG/M2 | WEIGHT: 229.6 LBS | HEART RATE: 77 BPM | OXYGEN SATURATION: 97 % | SYSTOLIC BLOOD PRESSURE: 133 MMHG | DIASTOLIC BLOOD PRESSURE: 80 MMHG | RESPIRATION RATE: 16 BRPM

## 2022-03-16 DIAGNOSIS — Z94.81 STATUS POST BONE MARROW TRANSPLANT (H): ICD-10-CM

## 2022-03-16 DIAGNOSIS — F41.8 SITUATIONAL ANXIETY: ICD-10-CM

## 2022-03-16 DIAGNOSIS — C91.01 ACUTE LYMPHOBLASTIC LEUKEMIA (ALL) IN REMISSION (H): Primary | ICD-10-CM

## 2022-03-16 DIAGNOSIS — C91.01 ACUTE LYMPHOBLASTIC LEUKEMIA (ALL) IN REMISSION (H): ICD-10-CM

## 2022-03-16 DIAGNOSIS — T86.09 GVHD AS COMPLICATION OF BONE MARROW TRANSPLANT (H): ICD-10-CM

## 2022-03-16 DIAGNOSIS — D89.813 GVHD AS COMPLICATION OF BONE MARROW TRANSPLANT (H): ICD-10-CM

## 2022-03-16 LAB
ALBUMIN SERPL-MCNC: 2.8 G/DL (ref 3.4–5)
ALP SERPL-CCNC: 109 U/L (ref 40–150)
ALT SERPL W P-5'-P-CCNC: 161 U/L (ref 0–70)
ANION GAP SERPL CALCULATED.3IONS-SCNC: 8 MMOL/L (ref 3–14)
AST SERPL W P-5'-P-CCNC: 98 U/L (ref 0–45)
BASOPHILS # BLD AUTO: 0 10E3/UL (ref 0–0.2)
BASOPHILS NFR BLD AUTO: 0 %
BILIRUB SERPL-MCNC: 1.1 MG/DL (ref 0.2–1.3)
BUN SERPL-MCNC: 48 MG/DL (ref 7–30)
CALCIUM SERPL-MCNC: 8.3 MG/DL (ref 8.5–10.1)
CHLORIDE BLD-SCNC: 105 MMOL/L (ref 94–109)
CMV DNA SPEC NAA+PROBE-ACNC: <137 IU/ML
CMV DNA SPEC NAA+PROBE-LOG#: <2.1 {LOG_COPIES}/ML
CO2 SERPL-SCNC: 25 MMOL/L (ref 20–32)
CREAT SERPL-MCNC: 1.14 MG/DL (ref 0.66–1.25)
EOSINOPHIL # BLD AUTO: 0 10E3/UL (ref 0–0.7)
EOSINOPHIL NFR BLD AUTO: 0 %
ERYTHROCYTE [DISTWIDTH] IN BLOOD BY AUTOMATED COUNT: 19.1 % (ref 10–15)
GFR SERPL CREATININE-BSD FRML MDRD: 72 ML/MIN/1.73M2
GLUCOSE BLD-MCNC: 121 MG/DL (ref 70–99)
HCT VFR BLD AUTO: 48.3 % (ref 40–53)
HGB BLD-MCNC: 16.5 G/DL (ref 13.3–17.7)
IMM GRANULOCYTES # BLD: 0 10E3/UL
IMM GRANULOCYTES NFR BLD: 0 %
LYMPHOCYTES # BLD AUTO: 1.7 10E3/UL (ref 0.8–5.3)
LYMPHOCYTES NFR BLD AUTO: 21 %
MCH RBC QN AUTO: 31.3 PG (ref 26.5–33)
MCHC RBC AUTO-ENTMCNC: 34.2 G/DL (ref 31.5–36.5)
MCV RBC AUTO: 92 FL (ref 78–100)
MONOCYTES # BLD AUTO: 0.6 10E3/UL (ref 0–1.3)
MONOCYTES NFR BLD AUTO: 8 %
NEUTROPHILS # BLD AUTO: 5.7 10E3/UL (ref 1.6–8.3)
NEUTROPHILS NFR BLD AUTO: 71 %
NRBC # BLD AUTO: 0 10E3/UL
NRBC BLD AUTO-RTO: 0 /100
PLATELET # BLD AUTO: 137 10E3/UL (ref 150–450)
POTASSIUM BLD-SCNC: 4.5 MMOL/L (ref 3.4–5.3)
PROT SERPL-MCNC: 6.5 G/DL (ref 6.8–8.8)
RBC # BLD AUTO: 5.28 10E6/UL (ref 4.4–5.9)
SODIUM SERPL-SCNC: 138 MMOL/L (ref 133–144)
WBC # BLD AUTO: 8 10E3/UL (ref 4–11)

## 2022-03-16 PROCEDURE — 87799 DETECT AGENT NOS DNA QUANT: CPT

## 2022-03-16 PROCEDURE — 258N000003 HC RX IP 258 OP 636: Performed by: STUDENT IN AN ORGANIZED HEALTH CARE EDUCATION/TRAINING PROGRAM

## 2022-03-16 PROCEDURE — 250N000011 HC RX IP 250 OP 636: Performed by: PHYSICIAN ASSISTANT

## 2022-03-16 PROCEDURE — 250N000011 HC RX IP 250 OP 636: Performed by: STUDENT IN AN ORGANIZED HEALTH CARE EDUCATION/TRAINING PROGRAM

## 2022-03-16 PROCEDURE — 80053 COMPREHEN METABOLIC PANEL: CPT

## 2022-03-16 PROCEDURE — 96365 THER/PROPH/DIAG IV INF INIT: CPT

## 2022-03-16 PROCEDURE — 36591 DRAW BLOOD OFF VENOUS DEVICE: CPT

## 2022-03-16 PROCEDURE — 99215 OFFICE O/P EST HI 40 MIN: CPT

## 2022-03-16 PROCEDURE — G0463 HOSPITAL OUTPT CLINIC VISIT: HCPCS | Mod: 25

## 2022-03-16 PROCEDURE — 85025 COMPLETE CBC W/AUTO DIFF WBC: CPT

## 2022-03-16 RX ORDER — HEPARIN SODIUM,PORCINE 10 UNIT/ML
5 VIAL (ML) INTRAVENOUS
Status: CANCELLED | OUTPATIENT
Start: 2022-04-07

## 2022-03-16 RX ORDER — OMEPRAZOLE 40 MG/1
40 CAPSULE, DELAYED RELEASE ORAL DAILY
Qty: 30 CAPSULE | Refills: 3 | Status: SHIPPED | OUTPATIENT
Start: 2022-03-16 | End: 2022-10-10

## 2022-03-16 RX ORDER — ESCITALOPRAM OXALATE 10 MG/1
10 TABLET ORAL DAILY
Qty: 30 TABLET | Refills: 0 | Status: SHIPPED | OUTPATIENT
Start: 2022-03-16 | End: 2022-03-31

## 2022-03-16 RX ORDER — HEPARIN SODIUM (PORCINE) LOCK FLUSH IV SOLN 100 UNIT/ML 100 UNIT/ML
5 SOLUTION INTRAVENOUS
Status: CANCELLED | OUTPATIENT
Start: 2022-04-07

## 2022-03-16 RX ORDER — ALBUTEROL SULFATE 5 MG/ML
2.5 SOLUTION RESPIRATORY (INHALATION) EVERY 6 HOURS PRN
Status: CANCELLED
Start: 2022-04-07

## 2022-03-16 RX ORDER — HEPARIN SODIUM (PORCINE) LOCK FLUSH IV SOLN 100 UNIT/ML 100 UNIT/ML
5 SOLUTION INTRAVENOUS ONCE
Status: COMPLETED | OUTPATIENT
Start: 2022-03-16 | End: 2022-03-16

## 2022-03-16 RX ORDER — PENTAMIDINE ISETHIONATE 300 MG/300MG
300 INHALANT RESPIRATORY (INHALATION)
Status: CANCELLED
Start: 2022-04-07

## 2022-03-16 RX ADMIN — Medication 5 ML: at 10:10

## 2022-03-16 RX ADMIN — SODIUM CHLORIDE, PRESERVATIVE FREE 5 ML: 5 INJECTION INTRAVENOUS at 12:32

## 2022-03-16 RX ADMIN — SODIUM CHLORIDE 300 MG: 9 INJECTION, SOLUTION INTRAVENOUS at 10:58

## 2022-03-16 ASSESSMENT — PAIN SCALES - GENERAL: PAINLEVEL: NO PAIN (0)

## 2022-03-16 NOTE — LETTER
3/16/2022         RE: Nik Nava  [address] [facility] [address]        Dear Colleague,    Thank you for referring your patient, Nik Nava, to the [facility] BLOOD AND MARROW TRANSPLANT PROGRAM [place]. Please see a copy of my visit note below.    Infusion Nursing Note:  Nik Nava presents today for scheduled infusion    Patient seen by provider today: Yes: Teressa   present during visit today: Not Applicable.    Note: Patient received micafungin per therapy plan.      Intravenous Access:  Implanted Port.    Treatment Conditions:  Results reviewed, labs MET treatment parameters, ok to proceed with treatment.      Post Infusion Assessment:  Patient tolerated infusion without incident.       Discharge Plan:   Patient and/or family verbalized understanding of discharge instructions and all questions answered.      Rima Simon RN                          Again, thank you for allowing me to participate in the care of your patient.        Sincerely,        [facility]

## 2022-03-16 NOTE — PROGRESS NOTES
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  Mouth is okay.  Tapering steroids without any issues.      Review of Systems                                                                                                                                         8 point Review of systems was o/w negative     PHYSICAL EXAM                                                                                                                                                 KPS: 80  Blood pressure 133/80, pulse 77, resp. rate 16, weight 104.1 kg (229 lb 9.6 oz), SpO2 97 %.    Wt Readings from Last 4 Encounters:   03/16/22 104.1 kg (229 lb 9.6 oz)   03/09/22 103 kg (227 lb)   03/04/22 103.6 kg (228 lb 4.8 oz)   03/02/22 103.8 kg (228 lb 12.8 oz)      Lab Results   Component Value Date    WBC 8.0 03/16/2022    ANEU 3.5 10/26/2021    HGB 16.5 03/16/2022    HCT 48.3 03/16/2022     (L) 03/16/2022     03/16/2022    POTASSIUM 4.5 03/16/2022    CHLORIDE 105 03/16/2022    CO2 25 03/16/2022     (H) 03/16/2022    BUN 48 (H) 03/16/2022    CR 1.14 03/16/2022    MAG 2.0 03/09/2022    INR 0.96 02/07/2022    BILITOTAL 1.1 03/16/2022    AST 98 (H) 03/16/2022     (H) 03/16/2022    ALKPHOS 109 03/16/2022    PROTTOTAL 6.5 (L) 03/16/2022    ALBUMIN 2.8 (L) 03/16/2022       General: NAD  HEENT: sclera anicteric.  Left ear with serous fluid behind TM; no erythema, no purulence.   Lungs:  CTA b/l   no wheezes  CV: RRR  no gallop  Abd; soft no tenderness; BS nl  No big Hep or Spleen; no rebound.  Ext/ Skin: rash resolved on visible areas, back.  Psych:  Good outlook  No LE edema or bone tenderness  Full ROM UE and LE; full ROM wrists, fingers, ankles     Current aGVHD staging: Skin, UGI 0, LGI 0, Liver 0 (3/9/2022)      ASSESSMENT AND PLAN   Nik Nava is a 61 year old male with Ph Pos ALL, day  218 s/p sib allo stem cell transplant    Day -6 (8/4): flu/cy  Day -5 through day -2 (8/5-8/8): flu  Day -1 (8/9): TBI  Day 0 (8/10): transplant     1.  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 S/p allo sib PBSCT. (ABO matched)  HCT-CI score: 3 (prior solid tumor)  Day +100 bone marrow biopsy is 100% donor with no morphologic or flow cytometric evidence of leukemia BCR abl is pending.  - Day +180 restaging BM bx- still in CR  100% donor;  2/16 BCR ABL major breakpoint undetectable.     2.  HEME:   - Transfuse for hgb <7g/dL; plt < 10k.  No transfusions needs  -  pulmonary emboli: He developed a pulmonary emboli in the setting of receiving PEG asparaginase (ie provoked thrombus).  Xarelto complete.       3.  FEN/Renal: Oral intake good/stable  - Cr stable  - He has a history of renal cancer and resection. Has a single kidney.    4.    GVHD:   #Skin GVHD; resolved. Cont tac to 1mg qAM and tac 0.5gHS as of Feb 7;  Do not taper, per previous notes.   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  # Ocular GVHD: follows with ophthalmology. Maxitrol to lids, continue refreshing drops QID. Has follow up in May; he is hoping to move it sooner.   # Oral GVHD: dex s/s; tac ointment to lips.   Steroid taper:  3/1-3/16: prednisone 100mg every day  3/17 drops to 75mg every day  Plan per previous notes:  1 mg/kg next week for 2 weeks; then taper over 2 weeks to 0.75 mg/kg/day and then taper over 2 weeks to 0.5 mg/kg/day and then taper over 4 weeks to 0.5 mg/kg every other day    Consented 2/24 for PQRST observational study.      5.  ID: Afebrile  Otitis media 3/1: s/p levaquin.  Sudafed to dry up fluid behind ear.  PPx: ACV, levaquin, micafungin M/W/F. Pentamidine given 3/9.   EBV viremia: up to 7838 at 3/1 check; pending 3/16.    S/p covid vaccine series 12/2021  Pt given info on EVUSHASHWINI; unforutnately, he left today before getting his second dose of Evusheld.  Should get at next  visit.     6. Endo: Hx of graves disease; On synthroid 175 mcg daily     7. CV: Norvasc: Decrease to 2.5mg 2/21. BP stable despite starting steroids.    8. GI: no n/v/d  Omeprazole for heartburn  LFTs as above  Suggested senna 3/7 for constipation    9. Psych:   - Situational anxiety - start lexapro 10mg daily. He feels this week like it is working.   - Insomnia: ativan. Trazadone was not helpful.     RTC 3/25; will do micafungin at home 3/21 and 3/23 of next week.     I spent 45 minutes in the care of this patient today, which included time necessary for preparation for the visit, obtaining history, ordering medications/tests/procedures as medically indicated, review of pertinent medical literature, counseling of the patient, communication of recommendations to the care team, and documentation time.    Teressa Rick PA-C  3/16/2022

## 2022-03-16 NOTE — LETTER
3/16/2022         RE: Nik Nava  [address] [facility] [address]        Dear Colleague,    Thank you for referring your patient, Nik Nava, to the [facility] BLOOD AND MARROW TRANSPLANT PROGRAM [place]. Please see a copy of my visit note below.      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  Mouth is okay.  Tapering steroids without any issues.      Review of Systems                                                                                                                                         8 point Review of systems was o/w negative     PHYSICAL EXAM                                                                                                                                                 KPS: 80  Blood pressure 133/80, pulse 77, resp. rate 16, weight 104.1 kg (229 lb 9.6 oz), SpO2 97 %.    Wt Readings from Last 4 Encounters:   03/16/22 104.1 kg (229 lb 9.6 oz)   03/09/22 103 kg (227 lb)   03/04/22 103.6 kg (228 lb 4.8 oz)   03/02/22 103.8 kg (228 lb 12.8 oz)      Lab Results   Component Value Date    WBC 8.0 03/16/2022    ANEU 3.5 10/26/2021    HGB 16.5 03/16/2022    HCT 48.3 03/16/2022     (L) 03/16/2022     03/16/2022    POTASSIUM 4.5 03/16/2022    CHLORIDE 105 03/16/2022    CO2 25 03/16/2022     (H) 03/16/2022    BUN 48 (H) 03/16/2022    CR 1.14 03/16/2022    MAG 2.0 03/09/2022    INR 0.96 02/07/2022    BILITOTAL 1.1 03/16/2022    AST 98 (H) 03/16/2022     (H) 03/16/2022    ALKPHOS 109 03/16/2022    PROTTOTAL 6.5 (L) 03/16/2022    ALBUMIN 2.8 (L) 03/16/2022       General: NAD  HEENT: sclera anicteric.  Left ear with serous fluid behind TM; no erythema, no purulence.   Lungs:  CTA b/l   no wheezes  CV: RRR  no gallop  Abd; soft no tenderness; BS nl  No big Hep or Spleen; no rebound.  Ext/ Skin: rash resolved  on visible areas, back.  Psych:  Good outlook  No LE edema or bone tenderness  Full ROM UE and LE; full ROM wrists, fingers, ankles     Current aGVHD staging: Skin, UGI 0, LGI 0, Liver 0 (3/9/2022)    ASSESSMENT AND PLAN   Nik Nava is a 61 year old male with Ph Pos ALL, day 218 s/p sib allo stem cell transplant    Day -6 (8/4): flu/cy  Day -5 through day -2 (8/5-8/8): flu  Day -1 (8/9): TBI  Day 0 (8/10): transplant     1.  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 S/p allo sib PBSCT. (ABO matched)  HCT-CI score: 3 (prior solid tumor)  Day +100 bone marrow biopsy is 100% donor with no morphologic or flow cytometric evidence of leukemia BCR abl is pending.  - Day +180 restaging BM bx- still in CR  100% donor;  2/16 BCR ABL major breakpoint undetectable.     2.  HEME:   - Transfuse for hgb <7g/dL; plt < 10k.  No transfusions needs  -  pulmonary emboli: He developed a pulmonary emboli in the setting of receiving PEG asparaginase (ie provoked thrombus).  Xarelto complete.       3.  FEN/Renal: Oral intake good/stable  - Cr stable  - He has a history of renal cancer and resection. Has a single kidney.    4.    GVHD:   #Skin GVHD; resolved. Cont tac to 1mg qAM and tac 0.5gHS as of Feb 7;  Do not taper, per previous notes.   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  # Ocular GVHD: follows with ophthalmology. Maxitrol to lids, continue refreshing drops QID. Has follow up in May; he is hoping to move it sooner.   # Oral GVHD: dex s/s; tac ointment to lips.   Steroid taper:  3/1-3/16: prednisone 100mg every day  3/17 drops to 75mg every day  Plan per previous notes:  1 mg/kg next week for 2 weeks; then taper over 2 weeks to 0.75 mg/kg/day and then taper over 2 weeks to 0.5 mg/kg/day and then taper over 4 weeks to 0.5 mg/kg every other day    Consented 2/24 for PQRST observational study.      5.  ID: Afebrile  Otitis media 3/1: s/p levaquin.  Sudafed to dry up fluid  behind ear.  PPx: ACV, levaquin, micafungin M/W/F. Pentamidine given 3/9.   EBV viremia: up to 7838 at 3/1 check; pending 3/16.    S/p covid vaccine series 12/2021  Pt given info on EVUSHELD; unforutnately, he left today before getting his second dose of Evusheld.  Should get at next visit.     6. Endo: Hx of graves disease; On synthroid 175 mcg daily     7. CV: Norvasc: Decrease to 2.5mg 2/21. BP stable despite starting steroids.    8. GI: no n/v/d  Omeprazole for heartburn  LFTs as above  Suggested senna 3/7 for constipation    9. Psych:   - Situational anxiety - start lexapro 10mg daily. He feels this week like it is working.   - Insomnia: ativan. Trazadone was not helpful.     RTC 3/25; will do micafungin at home 3/21 and 3/23 of next week.     I spent 45 minutes in the care of this patient today, which included time necessary for preparation for the visit, obtaining history, ordering medications/tests/procedures as medically indicated, review of pertinent medical literature, counseling of the patient, communication of recommendations to the care team, and documentation time.    Teressa Rick PA-C  3/16/2022          Again, thank you for allowing me to participate in the care of your patient.      Sincerely,    BMT Advanced Practice Provider

## 2022-03-16 NOTE — NURSING NOTE
"Oncology Rooming Note    March 16, 2022 10:50 AM   Nik Nava is a 61 year old male who presents for:    Chief Complaint   Patient presents with     Port Draw     Labs drawn via port by RN in lab. VS Taken.       Oncology Clinic Visit     Hx ALL here for provider appt     Initial Vitals: /80 (BP Location: Right arm, Patient Position: Sitting, Cuff Size: Adult Regular)   Pulse 77   Resp 16   Wt 104.1 kg (229 lb 9.6 oz)   SpO2 97%   BMI 30.30 kg/m   Estimated body mass index is 30.3 kg/m  as calculated from the following:    Height as of 2/24/22: 1.854 m (6' 0.99\").    Weight as of this encounter: 104.1 kg (229 lb 9.6 oz). Body surface area is 2.32 meters squared.  No Pain (0) Comment: Data Unavailable   No LMP for male patient.  Allergies reviewed: Yes  Medications reviewed: Yes    Medications: Medication refills not needed today.  Pharmacy name entered into [employer]:    [facility] - [place], [place] - [address] [facility] [place], [place] - [address]    Clinical concerns: Pt states he may need refills based on medication changes today.     Rima Simon RN              "

## 2022-03-16 NOTE — NURSING NOTE
Chief Complaint   Patient presents with     Port Draw     Labs drawn via port by RN in lab. VS Taken.       Port de-accessed and re-accessed with 20 gauge 3/4 inch flat needle and labs drawn by RN.  Port flushed with saline and heparin.  Pt tolerated well.  VS taken.  Pt checked in for next appt.    Tamie Pruett RN

## 2022-03-16 NOTE — PROGRESS NOTES
Infusion Nursing Note:  Nik Nava presents today for scheduled infusion    Patient seen by provider today: Yes: Teressa   present during visit today: Not Applicable.    Note: Patient received micafungin per therapy plan.      Intravenous Access:  Implanted Port.    Treatment Conditions:  Results reviewed, labs MET treatment parameters, ok to proceed with treatment.      Post Infusion Assessment:  Patient tolerated infusion without incident.       Discharge Plan:   Patient and/or family verbalized understanding of discharge instructions and all questions answered.      Rima Simon RN

## 2022-03-17 ENCOUNTER — HOME INFUSION (PRE-WILLOW HOME INFUSION) (OUTPATIENT)
Dept: PHARMACY | Facility: CLINIC | Age: 62
End: 2022-03-17
Payer: COMMERCIAL

## 2022-03-17 LAB
EBV DNA COPIES/ML, INSTRUMENT: ABNORMAL COPIES/ML
EBV DNA SPEC NAA+PROBE-LOG#: 4.1 {LOG_COPIES}/ML

## 2022-03-18 NOTE — PROGRESS NOTES
This is a recent snapshot of the patient's [facility] medical record.  For current drug dose and complete information and questions, call [phone] or In Basket pool, [facility] home infusion ([id number])  CSN Number:  [id number]

## 2022-03-21 ENCOUNTER — MYC REFILL (OUTPATIENT)
Dept: TRANSPLANT | Facility: CLINIC | Age: 62
End: 2022-03-21
Payer: COMMERCIAL

## 2022-03-21 DIAGNOSIS — Z94.81 STATUS POST BONE MARROW TRANSPLANT (H): ICD-10-CM

## 2022-03-22 RX ORDER — LORAZEPAM 1 MG/1
1 TABLET ORAL
Qty: 30 TABLET | Refills: 0 | Status: SHIPPED | OUTPATIENT
Start: 2022-03-22 | End: 2022-04-15

## 2022-03-25 ENCOUNTER — ONCOLOGY VISIT (OUTPATIENT)
Dept: TRANSPLANT | Facility: CLINIC | Age: 62
End: 2022-03-25
Attending: INTERNAL MEDICINE
Payer: COMMERCIAL

## 2022-03-25 ENCOUNTER — LAB (OUTPATIENT)
Dept: LAB | Facility: CLINIC | Age: 62
End: 2022-03-25
Attending: INTERNAL MEDICINE
Payer: COMMERCIAL

## 2022-03-25 ENCOUNTER — HOME INFUSION (PRE-WILLOW HOME INFUSION) (OUTPATIENT)
Dept: PHARMACY | Facility: CLINIC | Age: 62
End: 2022-03-25

## 2022-03-25 VITALS
SYSTOLIC BLOOD PRESSURE: 128 MMHG | HEIGHT: 73 IN | HEART RATE: 68 BPM | WEIGHT: 218.7 LBS | BODY MASS INDEX: 28.98 KG/M2 | OXYGEN SATURATION: 97 % | TEMPERATURE: 97.5 F | RESPIRATION RATE: 16 BRPM | DIASTOLIC BLOOD PRESSURE: 86 MMHG

## 2022-03-25 DIAGNOSIS — C91.01 ACUTE LYMPHOBLASTIC LEUKEMIA (ALL) IN REMISSION (H): ICD-10-CM

## 2022-03-25 DIAGNOSIS — Z94.81 STATUS POST BONE MARROW TRANSPLANT (H): Primary | ICD-10-CM

## 2022-03-25 DIAGNOSIS — Z00.6 RESEARCH STUDY PATIENT: ICD-10-CM

## 2022-03-25 DIAGNOSIS — Z00.6 RESEARCH STUDY PATIENT: Primary | ICD-10-CM

## 2022-03-25 LAB
ALBUMIN SERPL-MCNC: 2.7 G/DL (ref 3.4–5)
ALP SERPL-CCNC: 185 U/L (ref 40–150)
ALT SERPL W P-5'-P-CCNC: 263 U/L (ref 0–70)
ANION GAP SERPL CALCULATED.3IONS-SCNC: 8 MMOL/L (ref 3–14)
AST SERPL W P-5'-P-CCNC: 131 U/L (ref 0–45)
BASOPHILS # BLD AUTO: 0 10E3/UL (ref 0–0.2)
BASOPHILS NFR BLD AUTO: 0 %
BILIRUB SERPL-MCNC: 1 MG/DL (ref 0.2–1.3)
BUN SERPL-MCNC: 52 MG/DL (ref 7–30)
CALCIUM SERPL-MCNC: 8.4 MG/DL (ref 8.5–10.1)
CHLORIDE BLD-SCNC: 104 MMOL/L (ref 94–109)
CO2 SERPL-SCNC: 23 MMOL/L (ref 20–32)
CREAT SERPL-MCNC: 1.16 MG/DL (ref 0.66–1.25)
EOSINOPHIL # BLD AUTO: 0 10E3/UL (ref 0–0.7)
EOSINOPHIL NFR BLD AUTO: 0 %
ERYTHROCYTE [DISTWIDTH] IN BLOOD BY AUTOMATED COUNT: 18.4 % (ref 10–15)
GFR SERPL CREATININE-BSD FRML MDRD: 71 ML/MIN/1.73M2
GLUCOSE BLD-MCNC: 120 MG/DL (ref 70–99)
HCT VFR BLD AUTO: 48.2 % (ref 40–53)
HGB BLD-MCNC: 16.8 G/DL (ref 13.3–17.7)
IMM GRANULOCYTES # BLD: 0 10E3/UL
IMM GRANULOCYTES NFR BLD: 1 %
LYMPHOCYTES # BLD AUTO: 1.2 10E3/UL (ref 0.8–5.3)
LYMPHOCYTES NFR BLD AUTO: 17 %
MAGNESIUM SERPL-MCNC: 2.1 MG/DL (ref 1.6–2.3)
MCH RBC QN AUTO: 31.3 PG (ref 26.5–33)
MCHC RBC AUTO-ENTMCNC: 34.9 G/DL (ref 31.5–36.5)
MCV RBC AUTO: 90 FL (ref 78–100)
MONOCYTES # BLD AUTO: 0.6 10E3/UL (ref 0–1.3)
MONOCYTES NFR BLD AUTO: 8 %
NEUTROPHILS # BLD AUTO: 5.5 10E3/UL (ref 1.6–8.3)
NEUTROPHILS NFR BLD AUTO: 74 %
NRBC # BLD AUTO: 0 10E3/UL
NRBC BLD AUTO-RTO: 0 /100
PLATELET # BLD AUTO: 128 10E3/UL (ref 150–450)
POTASSIUM BLD-SCNC: 5 MMOL/L (ref 3.4–5.3)
PROT SERPL-MCNC: 6.2 G/DL (ref 6.8–8.8)
RBC # BLD AUTO: 5.36 10E6/UL (ref 4.4–5.9)
SODIUM SERPL-SCNC: 135 MMOL/L (ref 133–144)
TACROLIMUS BLD-MCNC: 7.8 UG/L (ref 5–15)
TME LAST DOSE: NORMAL H
TME LAST DOSE: NORMAL H
WBC # BLD AUTO: 7.4 10E3/UL (ref 4–11)

## 2022-03-25 PROCEDURE — 83735 ASSAY OF MAGNESIUM: CPT | Performed by: INTERNAL MEDICINE

## 2022-03-25 PROCEDURE — 96372 THER/PROPH/DIAG INJ SC/IM: CPT | Performed by: INTERNAL MEDICINE

## 2022-03-25 PROCEDURE — 82374 ASSAY BLOOD CARBON DIOXIDE: CPT | Performed by: INTERNAL MEDICINE

## 2022-03-25 PROCEDURE — 250N000011 HC RX IP 250 OP 636: Performed by: INTERNAL MEDICINE

## 2022-03-25 PROCEDURE — 80197 ASSAY OF TACROLIMUS: CPT | Performed by: INTERNAL MEDICINE

## 2022-03-25 PROCEDURE — G0463 HOSPITAL OUTPT CLINIC VISIT: HCPCS

## 2022-03-25 PROCEDURE — 36415 COLL VENOUS BLD VENIPUNCTURE: CPT

## 2022-03-25 PROCEDURE — M0220 HC INJECTION TIXAGEVIMAB & CILGAVIMAB (EVUSHELD): HCPCS | Performed by: INTERNAL MEDICINE

## 2022-03-25 PROCEDURE — 36591 DRAW BLOOD OFF VENOUS DEVICE: CPT | Performed by: INTERNAL MEDICINE

## 2022-03-25 PROCEDURE — 85025 COMPLETE CBC W/AUTO DIFF WBC: CPT | Performed by: INTERNAL MEDICINE

## 2022-03-25 PROCEDURE — 99215 OFFICE O/P EST HI 40 MIN: CPT | Performed by: INTERNAL MEDICINE

## 2022-03-25 RX ORDER — HEPARIN SODIUM (PORCINE) LOCK FLUSH IV SOLN 100 UNIT/ML 100 UNIT/ML
500 SOLUTION INTRAVENOUS ONCE
Status: COMPLETED | OUTPATIENT
Start: 2022-03-25 | End: 2022-03-25

## 2022-03-25 RX ORDER — PENTAMIDINE ISETHIONATE 300 MG/300MG
300 INHALANT RESPIRATORY (INHALATION)
Status: CANCELLED
Start: 2022-04-07

## 2022-03-25 RX ORDER — HEPARIN SODIUM (PORCINE) LOCK FLUSH IV SOLN 100 UNIT/ML 100 UNIT/ML
5 SOLUTION INTRAVENOUS
Status: CANCELLED | OUTPATIENT
Start: 2022-04-07

## 2022-03-25 RX ORDER — ALBUTEROL SULFATE 5 MG/ML
2.5 SOLUTION RESPIRATORY (INHALATION) EVERY 6 HOURS PRN
Status: CANCELLED
Start: 2022-04-07

## 2022-03-25 RX ORDER — HEPARIN SODIUM,PORCINE 10 UNIT/ML
5 VIAL (ML) INTRAVENOUS
Status: CANCELLED | OUTPATIENT
Start: 2022-04-07

## 2022-03-25 RX ADMIN — Medication 500 UNITS: at 10:30

## 2022-03-25 RX ADMIN — Medication 3 ML: at 11:47

## 2022-03-25 ASSESSMENT — PAIN SCALES - GENERAL: PAINLEVEL: NO PAIN (0)

## 2022-03-25 NOTE — LETTER
"  3/25/2022    RE: Nik Nava  [address] [facility] [id number]    Dear Colleague,    Thank you for referring your patient, Nik Nava, to the [facility] BLOOD AND MARROW TRANSPLANT PROGRAM [place]. Please see a copy of my visit note below.  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  Werea t get together last week, wife had N/V/fever, he had as of Monday decreased appetite and abd pain but no N/V, started having loose stools and gas and yesterday had 4 loose/watery BM last was at night ans more formed no blood, never had fevers, chills, no night sweats. No abd cramps today, peptobismol helped. NO  symptoms. Eyes still dry and gritty but feeling that he had \"sand in eyes\" is overall improving, uses ointment and is scheduled to see ophthalmology, no changes in vision overall, mouth dry but with no open ulcers per him does not limit ability to eat to a big extend, skin feels much better overall, no new area of red or bumps. Tapering steroids down to 75 mg, starting dose of 100mg.    Review of Systems                                                                                                                                         12 point Review of systems was o/w negative     PHYSICAL EXAM                                                                                                                                                 KPS: 80  BP (!) 131/102   Pulse 68   Temp 97.5  F (36.4  C) (Oral)   Resp 16   Ht 1.854 m (6' 0.99\")   Wt 99.2 kg (218 lb 11.2 oz)   SpO2 97%   BMI 28.86 kg/m      Wt Readings from Last 4 Encounters:   03/16/22 104.1 kg (229 lb 9.6 oz)   03/09/22 103 kg (227 lb)   03/04/22 103.6 kg (228 lb 4.8 oz)   03/02/22 103.8 kg (228 lb 12.8 oz)      General: NAD  HEENT: sclera anicteric.  Left ear with " resolving clear fluid behind TM; no erythema, no purulence.   No lichenoid changes or oral ulcers noted on buccal mucosa, inner lips or palate  Lungs:  CTA b/l   no wheezes  CV: RRR  no gallop  Abd; soft no tenderness; BS nl ; no HSM  Ext/ Skin: Hyperpigmentation noted on torso, for patient and wife resolving and fading, no other visible areas of new skin rashes, examined suprapubic area also with hyperpigmentation with no active inflammatory rash noted.    1+ edema or bone tenderness, patient wearing compression socks noted to have asymmetric swelling right more than left.  Patient wife unable to tell if this is any significantly worse compared to prior.  Patient does not have any history of DVT however he does have history of PE.  No calf tenderness.  Full ROM UE and LE; full ROM wrists, fingers, ankles    cGVHD therapy started on February 24 2022  - Baseline NIH scoring 2/24/2022 : skin 2 maculopapular rash/erythema with no sclerotic features, mouth score 1 mild symptoms with lichenoid changes less than 25%, eyes score 2 moderate symptoms without new visual impairments due to KCS requiring lubricant eyedrops more than 3 times a day, overall mild scale for her provider  - 3/25/2022 1 month NIH treatment assessment on PQRST: skin 2, mouth score 1 mild symptoms with NO lichenoid changes, eyes score 2 moderate symptoms w requiring lubricant eyedrops more than 3 times a day, liver score 1 ALT 3.7x ULN and nl bilirubin       ASSESSMENT AND PLAN   Nik Nava is a 61 year old male with Ph Pos ALL, 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 S/p allo sib PBSCT. (ABO matched)  HCT-CI score: 3 (prior solid tumor)  Day +100 bone marrow biopsy is 100% donor with no morphologic or flow cytometric evidence of leukemia BCR abl is  pending.  - Day +180 restaging BM bx- still in CR  100% donor;  2/16 BCR ABL major breakpoint undetectable.     2.  HEME:   - Transfuse for hgb <7g/dL; plt < 10k.  No transfusions needs  - Pulmonary emboli: He developed a pulmonary emboli in the setting of receiving PEG asparaginase (ie provoked thrombus).  Xarelto complete.     3.  FEN/Renal: Oral intake good/stable  - Cr stable  - He has a history of renal cancer and resection. Has a single kidney.    4.    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 Mild increase today, in the setting or recent viral GI illness, will hold prednisone at 75mg till next follow up.  # Ocular GVHD: follows with ophthalmology. Maxitrol to lids, continue refreshing drops QID. Has follow up in May.   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     -Steroid taper:  3/1-3/16: prednisone 100mg every day  3/17 drops to 75mg every day- Mild increase today, in the setting or recent viral GI illness, will hold prednisone at 75mg till next follow up.    - Tacrolimus level today drawn, patient took dose this am, will recheck and reminded to hold medications    Plan per primary BMT Dr. Hill:  1 mg/kg next week for 2 weeks; then taper over 2 weeks to 0.75 mg/kg/day and then taper over 2 weeks to 0.5 mg/kg/day and then taper over 4 weeks to 0.5 mg/kg every other day    5.  ID: Afebrile  Otitis media 3/1: s/p levaquin.  Sudafed to dry up fluid behind ear.  PPx: ACV, levaquin, micafungin M/W/F. Pentamidine given 3/9.   EBV viremia: up to 7838 at 3/1, check 3/16 up to 13,000, ordered recheck for 3/31  CMV 3/9 and 3/16/2022 <137, will add on to today labs  S/p covid vaccine series 12/2021  S/p anuradha full dose, second today 3/25  Talked to patient and evaluated by me. We discussed the risks  and benefits of receiving EVUSHELD, as well as alternative treatments. The Emergency Use Administration (EUA) was provided to the patient for review and an opportunity for questions was provided. Patient wishes to proceed with EVUSHELD.    6. Endo: Hx of graves disease; On synthroid 175 mcg daily     7. CV: Norvasc: Decrease to 2.5mg 2/21. BP stable despite starting steroids.    8. GI:   Omeprazole for heartburn  LFTs as above  Resolving recent GI viral illness, as above    9. Psych:   - Situational anxiety - lexapro 10mg daily.   - Insomnia: worse on steroids. Ativan, trazadone, melatonin, discussed dose optimization.     RTC Next Thursday, follow up on LFTs, GI symptoms, CMV/EBV for next week and add on for today CMV    I spent 45 minutes in the care of this patient today, which included time necessary for preparation for the visit, obtaining history, ordering medications/tests/procedures as medically indicated, review of pertinent medical literature, counseling of the patient, communication of recommendations to the care team, and documentation time.    Again, thank you for allowing me to participate in the care of your patient.      Sincerely,    Akshat Tobar MD

## 2022-03-25 NOTE — NURSING NOTE
"Chief Complaint   Patient presents with     Port Draw     Labs drawn via port by RN in lab.  VS taken       Port accessed with 20 gauge 3/4\" Power needle by RN, labs collected, line flushed with saline and heparin.  Vitals taken. Pt checked in for appointment(s).    Rajni Menezes RN    "

## 2022-03-25 NOTE — NURSING NOTE
Chief Complaint   Patient presents with     Port Draw     Labs drawn via port by RN in lab.  VS taken     Oncology Clinic Visit     [facility] RETURN - ALL     Administered 2nd dose of Evusheld (1.5ml injections). Pt tolerated well.    Cornelio White RN

## 2022-03-25 NOTE — PROGRESS NOTES
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  Werea t get together last week, wife had N/V/fever, he had as of Monday decreased appetite and abd pain but no N/V, started having loose stools and gas and yesterday had 4 loose/watery BM last was at night ans more formed no blood, never had fevers, chills, no night sweats. No abd cramps today, peptobismol helped. NO  symptoms. Eyes still dry and gritty but feeling that he had \"sand in eyes\" is overall improving, uses ointment and is scheduled to see ophthalmology, no changes in vision overall, mouth dry but with no open ulcers per him does not limit ability to eat to a big extend, skin feels much better overall, no new area of red or bumps. Tapering steroids down to 75 mg, starting dose of 100mg.    Review of Systems                                                                                                                                         12 point Review of systems was o/w negative     PHYSICAL EXAM                                                                                                                                                 KPS: 80  BP (!) 131/102   Pulse 68   Temp 97.5  F (36.4  C) (Oral)   Resp 16   Ht 1.854 m (6' 0.99\")   Wt 99.2 kg (218 lb 11.2 oz)   SpO2 97%   BMI 28.86 kg/m      Wt Readings from Last 4 Encounters:   03/16/22 104.1 kg (229 lb 9.6 oz)   03/09/22 103 kg (227 lb)   03/04/22 103.6 kg (228 lb 4.8 oz)   03/02/22 103.8 kg (228 lb 12.8 oz)      General: NAD  HEENT: sclera anicteric.  Left ear with resolving clear fluid behind TM; no erythema, no purulence.   No lichenoid changes or oral ulcers noted on buccal mucosa, inner lips or palate  Lungs:  CTA b/l   no wheezes  CV: RRR  no gallop  Abd; soft no tenderness; BS nl ; no HSM  Ext/ Skin: Hyperpigmentation noted on " torso, for patient and wife resolving and fading, no other visible areas of new skin rashes, examined suprapubic area also with hyperpigmentation with no active inflammatory rash noted.    1+ edema or bone tenderness, patient wearing compression socks noted to have asymmetric swelling right more than left.  Patient wife unable to tell if this is any significantly worse compared to prior.  Patient does not have any history of DVT however he does have history of PE.  No calf tenderness.  Full ROM UE and LE; full ROM wrists, fingers, ankles    cGVHD therapy started on February 24 2022  - Baseline NIH scoring 2/24/2022 : skin 2 maculopapular rash/erythema with no sclerotic features, mouth score 1 mild symptoms with lichenoid changes less than 25%, eyes score 2 moderate symptoms without new visual impairments due to KCS requiring lubricant eyedrops more than 3 times a day, overall mild scale for her provider  - 3/25/2022 1 month NIH treatment assessment on PQRST: skin 2, mouth score 1 mild symptoms with NO lichenoid changes, eyes score 2 moderate symptoms w requiring lubricant eyedrops more than 3 times a day, liver score 1 ALT 3.7x ULN and nl bilirubin       ASSESSMENT AND PLAN   Nik Nava is a 61 year old male with Ph Pos ALL, 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 S/p allo sib PBSCT. (ABO matched)  HCT-CI score: 3 (prior solid tumor)  Day +100 bone marrow biopsy is 100% donor with no morphologic or flow cytometric evidence of leukemia BCR abl is pending.  - Day +180 restaging BM bx- still in CR  100% donor;  2/16 BCR ABL major breakpoint undetectable.     2.  HEME:   - Transfuse for hgb <7g/dL; plt < 10k.  No transfusions needs  - Pulmonary emboli: He developed a pulmonary emboli in the setting of receiving PEG  asparaginase (ie provoked thrombus).  Xarelto complete.     3.  FEN/Renal: Oral intake good/stable  - Cr stable  - He has a history of renal cancer and resection. Has a single kidney.    4.    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 Mild increase today, in the setting or recent viral GI illness, will hold prednisone at 75mg till next follow up.  # Ocular GVHD: follows with ophthalmology. Maxitrol to lids, continue refreshing drops QID. Has follow up in May.   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     -Steroid taper:  3/1-3/16: prednisone 100mg every day  3/17 drops to 75mg every day- Mild increase today, in the setting or recent viral GI illness, will hold prednisone at 75mg till next follow up.    - Tacrolimus level today drawn, patient took dose this am, will recheck and reminded to hold medications    Plan per primary BMT Dr. Hill:  1 mg/kg next week for 2 weeks; then taper over 2 weeks to 0.75 mg/kg/day and then taper over 2 weeks to 0.5 mg/kg/day and then taper over 4 weeks to 0.5 mg/kg every other day    5.  ID: Afebrile  Otitis media 3/1: s/p levaquin.  Sudafed to dry up fluid behind ear.  PPx: ACV, levaquin, micafungin M/W/F. Pentamidine given 3/9.   EBV viremia: up to 7838 at 3/1, check 3/16 up to 13,000, ordered recheck for 3/31  CMV 3/9 and 3/16/2022 <137, will add on to today labs  S/p covid vaccine series 12/2021  S/p evusheld full dose, second today 3/25  Talked to patient and evaluated by me. We discussed the risks and benefits of receiving EVUSHELD, as well as alternative treatments. The Emergency Use Administration (EUA) was provided to the patient for review and an opportunity for questions was provided. Patient wishes to proceed with EVUSHELD.    6. Endo: Hx of graves disease; On  synthroid 175 mcg daily     7. CV: Norvasc: Decrease to 2.5mg 2/21. BP stable despite starting steroids.    8. GI:   Omeprazole for heartburn  LFTs as above  Resolving recent GI viral illness, as above    9. Psych:   - Situational anxiety - lexapro 10mg daily.   - Insomnia: worse on steroids. Ativan, trazadone, melatonin, discussed dose optimization.     RTC Next Thursday, follow up on LFTs, GI symptoms, CMV/EBV for next week and add on for today CMV    I spent 45 minutes in the care of this patient today, which included time necessary for preparation for the visit, obtaining history, ordering medications/tests/procedures as medically indicated, review of pertinent medical literature, counseling of the patient, communication of recommendations to the care team, and documentation time.

## 2022-03-25 NOTE — NURSING NOTE
"Oncology Rooming Note    March 25, 2022 10:39 AM   Nik Nava is a 61 year old male who presents for:    Chief Complaint   Patient presents with     Port Draw     Labs drawn via port by RN in lab.  VS taken     Oncology Clinic Visit     [facility] RETURN - ALL     Initial Vitals: BP (!) 131/102   Pulse 68   Temp 97.5  F (36.4  C) (Oral)   Resp 16   Ht 1.854 m (6' 0.99\")   Wt 99.2 kg (218 lb 11.2 oz)   SpO2 97%   BMI 28.86 kg/m   Estimated body mass index is 28.86 kg/m  as calculated from the following:    Height as of this encounter: 1.854 m (6' 0.99\").    Weight as of this encounter: 99.2 kg (218 lb 11.2 oz). Body surface area is 2.26 meters squared.  No Pain (0) Comment: Data Unavailable   No LMP for male patient.  Allergies reviewed: Yes  Medications reviewed: Yes    Medications: Medication refills not needed today.  Pharmacy name entered into [employer]:    [facility] - [place], [place] - [address] [facility] [place], [place] - [phone]    Clinical concerns: Blood pressure 131/102. Avila Tobar was notified.      Kelvin Gomez LPN            "

## 2022-03-31 ENCOUNTER — APPOINTMENT (OUTPATIENT)
Dept: LAB | Facility: CLINIC | Age: 62
End: 2022-03-31
Attending: INTERNAL MEDICINE
Payer: COMMERCIAL

## 2022-03-31 ENCOUNTER — ONCOLOGY VISIT (OUTPATIENT)
Dept: TRANSPLANT | Facility: CLINIC | Age: 62
End: 2022-03-31
Attending: INTERNAL MEDICINE
Payer: COMMERCIAL

## 2022-03-31 VITALS
BODY MASS INDEX: 28.5 KG/M2 | RESPIRATION RATE: 16 BRPM | SYSTOLIC BLOOD PRESSURE: 129 MMHG | TEMPERATURE: 97.7 F | WEIGHT: 216 LBS | HEART RATE: 66 BPM | DIASTOLIC BLOOD PRESSURE: 75 MMHG | OXYGEN SATURATION: 98 %

## 2022-03-31 DIAGNOSIS — C91.01 ACUTE LYMPHOBLASTIC LEUKEMIA (ALL) IN REMISSION (H): ICD-10-CM

## 2022-03-31 DIAGNOSIS — T86.09 GVHD AS COMPLICATION OF BONE MARROW TRANSPLANT (H): ICD-10-CM

## 2022-03-31 DIAGNOSIS — D89.813 GVHD AS COMPLICATION OF BONE MARROW TRANSPLANT (H): ICD-10-CM

## 2022-03-31 DIAGNOSIS — Z94.81 STATUS POST BONE MARROW TRANSPLANT (H): ICD-10-CM

## 2022-03-31 DIAGNOSIS — F41.8 SITUATIONAL ANXIETY: ICD-10-CM

## 2022-03-31 LAB
ALBUMIN SERPL-MCNC: 2.6 G/DL (ref 3.4–5)
ALP SERPL-CCNC: 255 U/L (ref 40–150)
ALT SERPL W P-5'-P-CCNC: 312 U/L (ref 0–70)
ANION GAP SERPL CALCULATED.3IONS-SCNC: 8 MMOL/L (ref 3–14)
AST SERPL W P-5'-P-CCNC: ABNORMAL U/L
BASOPHILS # BLD AUTO: 0 10E3/UL (ref 0–0.2)
BASOPHILS NFR BLD AUTO: 0 %
BILIRUB SERPL-MCNC: 0.8 MG/DL (ref 0.2–1.3)
BUN SERPL-MCNC: 50 MG/DL (ref 7–30)
CALCIUM SERPL-MCNC: 8.7 MG/DL (ref 8.5–10.1)
CHLORIDE BLD-SCNC: 104 MMOL/L (ref 94–109)
CO2 SERPL-SCNC: 26 MMOL/L (ref 20–32)
CREAT SERPL-MCNC: 1.03 MG/DL (ref 0.66–1.25)
EOSINOPHIL # BLD AUTO: 0 10E3/UL (ref 0–0.7)
EOSINOPHIL NFR BLD AUTO: 0 %
ERYTHROCYTE [DISTWIDTH] IN BLOOD BY AUTOMATED COUNT: 18.6 % (ref 10–15)
GFR SERPL CREATININE-BSD FRML MDRD: 82 ML/MIN/1.73M2
GLUCOSE BLD-MCNC: 169 MG/DL (ref 70–99)
HCT VFR BLD AUTO: 46.4 % (ref 40–53)
HGB BLD-MCNC: 16.3 G/DL (ref 13.3–17.7)
IMM GRANULOCYTES # BLD: 0.1 10E3/UL
IMM GRANULOCYTES NFR BLD: 1 %
LYMPHOCYTES # BLD AUTO: 1 10E3/UL (ref 0.8–5.3)
LYMPHOCYTES NFR BLD AUTO: 16 %
MAGNESIUM SERPL-MCNC: 1.9 MG/DL (ref 1.6–2.3)
MCH RBC QN AUTO: 31 PG (ref 26.5–33)
MCHC RBC AUTO-ENTMCNC: 35.1 G/DL (ref 31.5–36.5)
MCV RBC AUTO: 88 FL (ref 78–100)
MONOCYTES # BLD AUTO: 0.2 10E3/UL (ref 0–1.3)
MONOCYTES NFR BLD AUTO: 2 %
NEUTROPHILS # BLD AUTO: 5.1 10E3/UL (ref 1.6–8.3)
NEUTROPHILS NFR BLD AUTO: 81 %
NRBC # BLD AUTO: 0 10E3/UL
NRBC BLD AUTO-RTO: 0 /100
PLATELET # BLD AUTO: 107 10E3/UL (ref 150–450)
POTASSIUM BLD-SCNC: 4.9 MMOL/L (ref 3.4–5.3)
PROT SERPL-MCNC: 6 G/DL (ref 6.8–8.8)
RBC # BLD AUTO: 5.25 10E6/UL (ref 4.4–5.9)
SODIUM SERPL-SCNC: 138 MMOL/L (ref 133–144)
WBC # BLD AUTO: 6.3 10E3/UL (ref 4–11)

## 2022-03-31 PROCEDURE — 87799 DETECT AGENT NOS DNA QUANT: CPT | Performed by: INTERNAL MEDICINE

## 2022-03-31 PROCEDURE — 80197 ASSAY OF TACROLIMUS: CPT | Performed by: INTERNAL MEDICINE

## 2022-03-31 PROCEDURE — 250N000011 HC RX IP 250 OP 636: Performed by: INTERNAL MEDICINE

## 2022-03-31 PROCEDURE — 99215 OFFICE O/P EST HI 40 MIN: CPT | Performed by: INTERNAL MEDICINE

## 2022-03-31 PROCEDURE — 83735 ASSAY OF MAGNESIUM: CPT | Performed by: INTERNAL MEDICINE

## 2022-03-31 PROCEDURE — 82306 VITAMIN D 25 HYDROXY: CPT | Performed by: INTERNAL MEDICINE

## 2022-03-31 PROCEDURE — 85025 COMPLETE CBC W/AUTO DIFF WBC: CPT | Performed by: INTERNAL MEDICINE

## 2022-03-31 PROCEDURE — G0463 HOSPITAL OUTPT CLINIC VISIT: HCPCS

## 2022-03-31 PROCEDURE — 36591 DRAW BLOOD OFF VENOUS DEVICE: CPT | Performed by: INTERNAL MEDICINE

## 2022-03-31 PROCEDURE — 84155 ASSAY OF PROTEIN SERUM: CPT | Performed by: INTERNAL MEDICINE

## 2022-03-31 RX ORDER — HEPARIN SODIUM (PORCINE) LOCK FLUSH IV SOLN 100 UNIT/ML 100 UNIT/ML
5 SOLUTION INTRAVENOUS
Status: COMPLETED | OUTPATIENT
Start: 2022-03-31 | End: 2022-03-31

## 2022-03-31 RX ORDER — PREDNISONE 50 MG/1
75 TABLET ORAL DAILY
Qty: 45 TABLET | Refills: 3 | COMMUNITY
Start: 2022-03-31 | End: 2022-08-18

## 2022-03-31 RX ORDER — ESCITALOPRAM OXALATE 10 MG/1
10 TABLET ORAL DAILY
Qty: 60 TABLET | Refills: 3 | Status: SHIPPED | OUTPATIENT
Start: 2022-03-31 | End: 2022-05-04

## 2022-03-31 RX ADMIN — Medication 5 ML: at 15:38

## 2022-03-31 ASSESSMENT — PAIN SCALES - GENERAL: PAINLEVEL: NO PAIN (0)

## 2022-03-31 NOTE — NURSING NOTE
"Oncology Rooming Note    March 31, 2022 3:55 PM   Nik Nava is a 61 year old male who presents for:    Chief Complaint   Patient presents with     Oncology Clinic Visit     Status post bone marrow transplant (H)     Port Draw     labs drawn from port by rn.  vs taken     Initial Vitals: /75 (BP Location: Right arm, Patient Position: Sitting, Cuff Size: Adult Large)   Pulse 66   Temp 97.7  F (36.5  C) (Oral)   Resp 16   Wt 98 kg (216 lb)   SpO2 98%   BMI 28.50 kg/m   Estimated body mass index is 28.5 kg/m  as calculated from the following:    Height as of 3/25/22: 1.854 m (6' 0.99\").    Weight as of this encounter: 98 kg (216 lb). Body surface area is 2.25 meters squared.  No Pain (0) Comment: Data Unavailable   No LMP for male patient.  Allergies reviewed: Yes  Medications reviewed: Yes    Medications: MEDICATION REFILLS NEEDED TODAY. Provider was notified.  Pharmacy name entered into [employer]:    [facility] - [place], [place] - [address] [facility] [place], [place] - [phone]    Clinical concerns: Please refill Lexapro, decreased sleep quality, please discuss drooling.       Missy Waterman LPN March 31, 2022 3:57 PM                "

## 2022-03-31 NOTE — NURSING NOTE
"Chief Complaint   Patient presents with     Oncology Clinic Visit     Status post bone marrow transplant (H)     Port Draw     labs drawn from port by rn.  vs taken     Port accessed with 20 gauge 3/4\" Power needle and labs drawn by rn.  Port flushed with NS and heparin.  Pt tolerated well.  VS taken.  Pt checked in for next appt.    Argenis Watkins RN      "

## 2022-03-31 NOTE — LETTER
"    3/31/2022         RE: Nik Nava  [address] [facility] [address]        Dear Colleague,    Thank you for referring your patient, Nik Nava, to the [facility] BLOOD AND MARROW TRANSPLANT PROGRAM [place]. Please see a copy of my visit note below.      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  Rash nearly gone.  Eating ok. No fcs or bleeding;  mild dry eyes but worse in AM  Mild dry mouth; also dry in AM \"due to mouth breathing while sleeping\"  Sl R leg swelling; not sore.  Occasional R hand cramping; no other cramps or charley horse.   steroids down to 75 mg, over last week.    Review of Systems                                                                                                                                         12 point Review of systems was o/w negative     PHYSICAL EXAM                                                                                                                                                 KPS: 80  /75 (BP Location: Right arm, Patient Position: Sitting, Cuff Size: Adult Large)   Pulse 66   Temp 97.7  F (36.5  C) (Oral)   Resp 16   Wt 98 kg (216 lb)   SpO2 98%   BMI 28.50 kg/m      Wt Readings from Last 4 Encounters:   03/31/22 98 kg (216 lb)   03/25/22 99.2 kg (218 lb 11.2 oz)   03/16/22 104.1 kg (229 lb 9.6 oz)   03/09/22 103 kg (227 lb)      General: NAD  HEENT: sclera anicteric. L ear now clear; no rednes or residual fluid in middle ear;  R ok  No lichenoid changes or oral ulcers noted on buccal mucosa, inner lips or palate; minimal erythema posterior buccal adjacent to uppre molars  Lungs:  CTA b/l   no wheezes  CV: RRR  no gallop  Abd; soft no tenderness; BS nl ; no HSM  Ext/ Skin: Hyperpigmentation noted on torso, for patient and wife resolving and fading, no " residual rash  LE  1+ edema  Rsl greater than L;    No bone tenderness, .  Patient does not have any history of DVT however he does have history of PE.  No calf tenderness.  Full ROM UE and LE; full ROM wrists, fingers, ankles    cGVHD therapy started on February 24 2022  - Baseline NIH scoring 2/24/2022 : skin 2 maculopapular rash/erythema with no sclerotic features, mouth score 1 mild symptoms with lichenoid changes less than 25%, eyes score 2 moderate symptoms without new visual impairments due to KCS requiring lubricant eyedrops more than 3 times a day, overall mild scale for her provider  - 3/25/2022 1 month NIH treatment assessment on PQRST: skin 2, mouth score 1 mild symptoms with NO lichenoid changes, eyes score 2 moderate symptoms w requiring lubricant eyedrops more than 3 times a day, liver score 1 ALT 3.7x ULN and nl bilirubin     3/31  Mouth clear;  eyes minimal (eye appt f/u tomorrow) LFT still abn; no joint or new GI sx      ASSESSMENT AND PLAN   Nik Nava is a 61 year old male with Ph Pos ALL, 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 S/p allo sib PBSCT. (ABO matched)  HCT-CI score: 3 (prior solid tumor)  Day +100 bone marrow biopsy is 100% donor with no morphologic or flow cytometric evidence of leukemia BCR abl is pending.  - Day +180 restaging BM bx- still in CR  100% donor;  2/16 BCR ABL major breakpoint undetectable.     2.  HEME:   - Transfuse for hgb <7g/dL; plt < 10k.  No transfusions needs  - Pulmonary emboli: He developed a pulmonary emboli in the setting of receiving PEG asparaginase (ie provoked thrombus).  Xarelto complete.     3.  FEN/Renal: Oral intake good/stable  - Cr stable  - He has a history of renal cancer and resection. Has a single kidney.    4.    GVHD: see scoring above  #Skin  GVHD- history of biopsy proven GVHD of the skin 8/30/2021; resolved. 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 Mild increase today, in the setting or recent viral GI illness, will hold prednisone at 75mg till next follow up.  # Ocular GVHD: follows with ophthalmology. Maxitrol to lids, continue refreshing drops QID. Has follow up in May.   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     -Steroid taper:  3/1-3/16: prednisone 100mg every day  3/17 drops to 75mg every day- last week continued pred same dose  3/31  need to begin taper to 75 alt with 50;  If GVHD sx and signs not controlled with tapering; will need new med.    Plan this dose for 2 weeks; then 75 alt with 25 for 2 weeks; then reassess with goal to get to 0.5 mg/kg (eg 50 mg) every other day if taper is tolerated.    - Tacrolimus level last week was a 3h trough at 7.8;  15+ hour trough today; level pending   Likely needing dose increase.    5.  ID: Afebrile  Otitis media 3/1: s/p levaquin.  Sudafed to dry up fluid behind ear.  PPx: ACV, levaquin, micafungin M/W/F. Pentamidine given 3/9.   EBV viremia: up to 7838 at 3/1, check 3/16 up to 13,000, ordered recheck for 3/31  CMV 3/9 and 3/16/2022 <137, will add on to today labs  S/p covid vaccine series 12/2021  S/p evusheld full dose, second today 3/25  Talked to patient and evaluated by me. We discussed the risks and benefits of receiving EVUSHELD, as well as alternative treatments. The Emergency Use Administration (EUA) was provided to the patient for review and an opportunity for questions was provided. Patient wishes to proceed with EVUSHELD.    6. Endo: Hx of graves disease; On synthroid 175 mcg daily     7. CV: Norvasc: Decrease to 2.5mg 2/21. BP stable despite starting steroids.    8. GI:   Omeprazole for heartburn  LFTs as above  Resolving recent GI viral illness, as above    9. Psych:   -  Situational anxiety - lexapro 10mg daily.   - Insomnia: worse on steroids. Ativan, trazadone, melatonin, discussed dose optimization.     RTC Next Wed, follow up on LFTs, GI symptoms, CMV/EBV for next week   Recheck TSH as well  I spent 45 minutes in the care of this patient today, which included time necessary for preparation for the visit, obtaining history, ordering medications/tests/procedures as medically indicated, review of pertinent medical literature, counseling of the patient, communication of recommendations to the care team, and documentation time.        Again, thank you for allowing me to participate in the care of your patient.      Sincerely,    Chapo Hill MD

## 2022-03-31 NOTE — PROGRESS NOTES
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  Rash nearly gone.  Eating ok. No fcs or bleeding;  mild dry eyes but worse in AM  Mild dry mouth; also dry in AM \"due to mouth breathing while sleeping\"  He also describes some drooling out of L corner of mouth; even while awake.  No coughing or choking or trouble swallowing;  Voice ok  Sl R leg swelling; not sore.  Occasional R hand cramping; no other cramps or charley horse.   steroids down to 75 mg, over last week.    Review of Systems                                                                                                                                         12 point Review of systems was o/w negative     PHYSICAL EXAM                                                                                                                                                 KPS: 80  /75 (BP Location: Right arm, Patient Position: Sitting, Cuff Size: Adult Large)   Pulse 66   Temp 97.7  F (36.5  C) (Oral)   Resp 16   Wt 98 kg (216 lb)   SpO2 98%   BMI 28.50 kg/m      Wt Readings from Last 4 Encounters:   03/31/22 98 kg (216 lb)   03/25/22 99.2 kg (218 lb 11.2 oz)   03/16/22 104.1 kg (229 lb 9.6 oz)   03/09/22 103 kg (227 lb)      General: NAD  HEENT: sclera anicteric. L ear now clear; no rednes or residual fluid in middle ear;  R ok  No lichenoid changes or oral ulcers noted on buccal mucosa, inner lips or palate; minimal erythema posterior buccal adjacent to uppre molars  Lungs:  CTA b/l   no wheezes  CV: RRR  no gallop  Abd; soft no tenderness; BS nl ; no HSM  Ext/ Skin: Hyperpigmentation noted on torso, for patient and wife resolving and fading, no residual rash  LE  1+ edema  Rsl greater than L;    No bone tenderness, .  Patient does not have any history of DVT however he does have history of PE.  No " calf tenderness.  Full ROM UE and LE; full ROM wrists, fingers, ankles  Neuro;  All CN intact; palate and face symmetric.    cGVHD therapy started on February 24 2022  - Baseline NIH scoring 2/24/2022 : skin 2 maculopapular rash/erythema with no sclerotic features, mouth score 1 mild symptoms with lichenoid changes less than 25%, eyes score 2 moderate symptoms without new visual impairments due to KCS requiring lubricant eyedrops more than 3 times a day, overall mild scale for her provider  - 3/25/2022 1 month NIH treatment assessment on PQRST: skin 2, mouth score 1 mild symptoms with NO lichenoid changes, eyes score 2 moderate symptoms w requiring lubricant eyedrops more than 3 times a day, liver score 1 ALT 3.7x ULN and nl bilirubin     3/31  Mouth clear;  eyes minimal (eye appt f/u tomorrow) LFT still abn; no joint or new GI sx      ASSESSMENT AND PLAN   Nik Nava is a 61 year old male with Ph Pos ALL, 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 S/p allo sib PBSCT. (ABO matched)  HCT-CI score: 3 (prior solid tumor)  Day +100 bone marrow biopsy is 100% donor with no morphologic or flow cytometric evidence of leukemia BCR abl is pending.  - Day +180 restaging BM bx- still in CR  100% donor;  2/16 BCR ABL major breakpoint undetectable.     2.  HEME:   - Transfuse for hgb <7g/dL; plt < 10k.  No transfusions needs  - Pulmonary emboli: He developed a pulmonary emboli in the setting of receiving PEG asparaginase (ie provoked thrombus).  Xarelto complete.     3.  FEN/Renal: Oral intake good/stable  - Cr stable  - He has a history of renal cancer and resection. Has a single kidney.    4.    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 Mild increase today, in the setting or recent viral GI illness, will hold prednisone at 75mg till next follow up.  # Ocular GVHD: follows with ophthalmology. Maxitrol to lids, continue refreshing drops QID. Has follow up in May.   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     -Steroid taper:  3/1-3/16: prednisone 100mg every day  3/17 drops to 75mg every day- last week continued pred same dose  3/31  need to begin taper to 75 alt with 50;  If GVHD sx and signs not controlled with tapering; will need new med.    Plan this dose for 2 weeks; then 75 alt with 25 for 2 weeks; then reassess with goal to get to 0.5 mg/kg (eg 50 mg) every other day if taper is tolerated.    - Tacrolimus level last week was a 3h trough at 7.8;  15+ hour trough today; level pending   Likely needing dose increase.    5.  ID: Afebrile  Otitis media 3/1: s/p levaquin.  Sudafed to dry up fluid behind ear.  PPx: ACV, levaquin, micafungin M/W/F. Pentamidine given 3/9.   EBV viremia: up to 7838 at 3/1, check 3/16 up to 13,000, ordered recheck for 3/31  CMV 3/9 and 3/16/2022 <137, will add on to today labs  S/p covid vaccine series 12/2021  S/p evusheld full dose, second today 3/25  Talked to patient and evaluated by me. We discussed the risks and benefits of receiving EVUSHELD, as well as alternative treatments. The Emergency Use Administration (EUA) was provided to the patient for review and an opportunity for questions was provided. Patient wishes to proceed with EVUSHELD.    6. Endo: Hx of graves disease; On synthroid 175 mcg daily     7. CV: Norvasc: Decrease to 2.5mg 2/21. BP stable despite starting steroids.    8. GI:   Omeprazole for heartburn  LFTs as above  Resolving recent GI viral illness, as above    9. Psych:   - Situational anxiety - lexapro 10mg daily.   - Insomnia: worse on steroids. Ativan, trazadone, melatonin,  discussed dose optimization.     RTC Next Wed, follow up on LFTs, GI symptoms, CMV/EBV for next week   Recheck TSH as well  I spent 45 minutes in the care of this patient today, which included time necessary for preparation for the visit, obtaining history, ordering medications/tests/procedures as medically indicated, review of pertinent medical literature, counseling of the patient, communication of recommendations to the care team, and documentation time.    Chapo Hill MD

## 2022-04-01 ENCOUNTER — HOME INFUSION (PRE-WILLOW HOME INFUSION) (OUTPATIENT)
Dept: PHARMACY | Facility: CLINIC | Age: 62
End: 2022-04-01

## 2022-04-01 ENCOUNTER — OFFICE VISIT (OUTPATIENT)
Dept: OPHTHALMOLOGY | Facility: CLINIC | Age: 62
End: 2022-04-01
Payer: COMMERCIAL

## 2022-04-01 ENCOUNTER — TELEPHONE (OUTPATIENT)
Dept: TRANSPLANT | Facility: CLINIC | Age: 62
End: 2022-04-01

## 2022-04-01 DIAGNOSIS — H52.03 HYPEROPIA WITH PRESBYOPIA OF BOTH EYES: ICD-10-CM

## 2022-04-01 DIAGNOSIS — H52.4 HYPEROPIA WITH PRESBYOPIA OF BOTH EYES: ICD-10-CM

## 2022-04-01 DIAGNOSIS — Z94.81 STATUS POST BONE MARROW TRANSPLANT (H): ICD-10-CM

## 2022-04-01 DIAGNOSIS — H04.129 DRY EYE: Primary | ICD-10-CM

## 2022-04-01 DIAGNOSIS — C91.01 ACUTE LYMPHOBLASTIC LEUKEMIA (ALL) IN REMISSION (H): ICD-10-CM

## 2022-04-01 LAB
CMV DNA IU/ML, INSTRUMENT: 291 IU/ML
CMV DNA SPEC NAA+PROBE-LOG#: 2.5 {LOG_COPIES}/ML
DEPRECATED CALCIDIOL+CALCIFEROL SERPL-MC: 19 UG/L (ref 20–75)
EBV DNA COPIES/ML, INSTRUMENT: ABNORMAL COPIES/ML
EBV DNA SPEC NAA+PROBE-LOG#: 4.2 {LOG_COPIES}/ML
TACROLIMUS BLD-MCNC: 3.4 UG/L (ref 5–15)
TME LAST DOSE: ABNORMAL H
TME LAST DOSE: ABNORMAL H

## 2022-04-01 PROCEDURE — 92014 COMPRE OPH EXAM EST PT 1/>: CPT | Performed by: OPTOMETRIST

## 2022-04-01 RX ORDER — TACROLIMUS 1 MG/1
1 CAPSULE ORAL 2 TIMES DAILY
Qty: 120 CAPSULE | Refills: 3 | COMMUNITY
Start: 2022-04-01 | End: 2022-04-20

## 2022-04-01 RX ORDER — TACROLIMUS 0.5 MG/1
CAPSULE ORAL
Qty: 60 CAPSULE | Refills: 1 | COMMUNITY
Start: 2022-04-01 | End: 2022-05-27

## 2022-04-01 ASSESSMENT — VISUAL ACUITY
OS_PH_SC+: -1
OD_PH_SC+: -1
OS_PH_SC: 20/25
OS_SC+: -2
OD_PH_SC: 20/40
OS_SC: 20/40
OD_SC: 20/60
METHOD: SNELLEN - LINEAR

## 2022-04-01 ASSESSMENT — CONF VISUAL FIELD
OD_NORMAL: 1
OS_NORMAL: 1

## 2022-04-01 ASSESSMENT — CUP TO DISC RATIO
OS_RATIO: 0.45
OD_RATIO: 0.45

## 2022-04-01 ASSESSMENT — SLIT LAMP EXAM - LIDS
COMMENTS: 1+ BLEPH, THICKENED MARGINS, 1-2+ MGD
COMMENTS: 1+ BLEPH, THICKENED MARGINS, 1-2+ MGD

## 2022-04-01 ASSESSMENT — TONOMETRY
OS_IOP_MMHG: 8
IOP_METHOD: ICARE
OD_IOP_MMHG: 10

## 2022-04-01 ASSESSMENT — EXTERNAL EXAM - LEFT EYE: OS_EXAM: DERMATOCHALASIS

## 2022-04-01 ASSESSMENT — EXTERNAL EXAM - RIGHT EYE: OD_EXAM: DERMATOCHALASIS

## 2022-04-01 NOTE — PROGRESS NOTES
A/P  1.) Dry Eye OU  -s/p BMT 08/2021  -Overall doing well on AT qid OU and Maxitrol benny. Did have stinging/grittiness with too much ointment but has improved once he reduced the amount used  -Corneas today still look excellent  -Does endorse fluctuating vision - likely related to MGD. He just got warm compress mask yesterday and will start doing these regularly.   -No need for more aggressive treatment at this time, but can consider should symptoms worsen  -Dilated ocular health unremarkable OU    2.) Hyperopia/Presbyopia OU  -BCVA 20/20 with correction. Uses low plus readers for distance and higher plus readers for near    Monitor 1 year comprehensive eye exam, sooner prn with worsening dryness symptoms    I have confirmed the patient's CC, HPI and reviewed Past Medical History, Past Surgical History, Social History, Family History, Problem List, Medication List and agree with Tech note.     Aisha Juarez, OD FAAO FSLS

## 2022-04-01 NOTE — NURSING NOTE
Chief Complaints and History of Present Illnesses   Patient presents with     Follow Up     Symptoms were worsening each eye but since are improving.     Chief Complaint(s) and History of Present Illness(es)     Follow Up     Laterality: both eyes    Associated symptoms: dryness.  Negative for eye pain, tearing and discharge    Pain scale: 0/10    Comments: Symptoms were worsening each eye but since are improving.              Comments     Patient states he made appt. Due to feeling that each eye were worsening but symptoms are improving since that appt. Decided to come in for check anyway. He reports feeling like he was putting in too much ointment in eye at night. With not closing each eye all the way feeling like sand was in each eye upon waking. Found putting less ointment in each eye began to feel better with each eye.  Lubricant QID each eye   Maxitrol ointment Q HS each eye.   Vision unchanged from last visit on 2/10/22 fluctuates with no pattern.     LLUVIA Choudhury COT 10:27 AM April 1, 2022

## 2022-04-04 ENCOUNTER — TELEPHONE (OUTPATIENT)
Dept: OPTOMETRY | Facility: CLINIC | Age: 62
End: 2022-04-04

## 2022-04-04 NOTE — TELEPHONE ENCOUNTER
Called and spoke to Nik Zaragoza had a questions in regards to aggressive treatment.     Per Dr. Juarez's note     No need for more aggressive treatment at this time, but can consider should symptoms worsen    I explained his eyes are doing well and he does not require a more aggressive treatment for dry eye.     He needed a refill on a med, but he would need to speak with his provider ( Dr. Hill)    Cheryle Heath Communication Facilitator on 4/4/2022 at 8:51 AM

## 2022-04-04 NOTE — TELEPHONE ENCOUNTER
[facility] Call Center    Phone Message    May a detailed message be left on voicemail: yes     Reason for Call: Other: Pt was in to see Dr. Juarez on 4/1. In the MyChart notes, it states to discontinue aggressive treatment and pt would like some clarification on what this means. Please call pt to discuss. Thank you.     Action Taken: Message routed to:  [facility] ([facility] EYE    Travel Screening: Not Applicable

## 2022-04-06 ENCOUNTER — ONCOLOGY VISIT (OUTPATIENT)
Dept: TRANSPLANT | Facility: CLINIC | Age: 62
End: 2022-04-06
Attending: INTERNAL MEDICINE
Payer: COMMERCIAL

## 2022-04-06 ENCOUNTER — APPOINTMENT (OUTPATIENT)
Dept: LAB | Facility: CLINIC | Age: 62
End: 2022-04-06
Attending: INTERNAL MEDICINE
Payer: COMMERCIAL

## 2022-04-06 VITALS
HEART RATE: 97 BPM | OXYGEN SATURATION: 96 % | BODY MASS INDEX: 29.18 KG/M2 | RESPIRATION RATE: 16 BRPM | DIASTOLIC BLOOD PRESSURE: 82 MMHG | SYSTOLIC BLOOD PRESSURE: 118 MMHG | TEMPERATURE: 97.7 F | WEIGHT: 221.12 LBS

## 2022-04-06 DIAGNOSIS — C91.01 ACUTE LYMPHOBLASTIC LEUKEMIA (ALL) IN REMISSION (H): ICD-10-CM

## 2022-04-06 DIAGNOSIS — Z94.81 STATUS POST BONE MARROW TRANSPLANT (H): ICD-10-CM

## 2022-04-06 DIAGNOSIS — D89.813 GVHD AS COMPLICATION OF BONE MARROW TRANSPLANT (H): ICD-10-CM

## 2022-04-06 DIAGNOSIS — T86.09 GVHD AS COMPLICATION OF BONE MARROW TRANSPLANT (H): ICD-10-CM

## 2022-04-06 LAB
ALBUMIN SERPL-MCNC: 2.5 G/DL (ref 3.4–5)
ALP SERPL-CCNC: 249 U/L (ref 40–150)
ALT SERPL W P-5'-P-CCNC: 239 U/L (ref 0–70)
AMYLASE SERPL-CCNC: 85 U/L (ref 30–110)
ANION GAP SERPL CALCULATED.3IONS-SCNC: 6 MMOL/L (ref 3–14)
AST SERPL W P-5'-P-CCNC: 114 U/L (ref 0–45)
BASOPHILS # BLD AUTO: 0 10E3/UL (ref 0–0.2)
BASOPHILS NFR BLD AUTO: 0 %
BILIRUB SERPL-MCNC: 0.7 MG/DL (ref 0.2–1.3)
BUN SERPL-MCNC: 40 MG/DL (ref 7–30)
CALCIUM SERPL-MCNC: 8.9 MG/DL (ref 8.5–10.1)
CHLORIDE BLD-SCNC: 104 MMOL/L (ref 94–109)
CO2 SERPL-SCNC: 26 MMOL/L (ref 20–32)
CREAT SERPL-MCNC: 0.99 MG/DL (ref 0.66–1.25)
EOSINOPHIL # BLD AUTO: 0 10E3/UL (ref 0–0.7)
EOSINOPHIL NFR BLD AUTO: 0 %
ERYTHROCYTE [DISTWIDTH] IN BLOOD BY AUTOMATED COUNT: 19.4 % (ref 10–15)
GFR SERPL CREATININE-BSD FRML MDRD: 86 ML/MIN/1.73M2
GLUCOSE BLD-MCNC: 104 MG/DL (ref 70–99)
HCT VFR BLD AUTO: 46.8 % (ref 40–53)
HGB BLD-MCNC: 16.1 G/DL (ref 13.3–17.7)
IMM GRANULOCYTES # BLD: 0.1 10E3/UL
IMM GRANULOCYTES NFR BLD: 1 %
LDH SERPL L TO P-CCNC: 350 U/L (ref 85–227)
LYMPHOCYTES # BLD AUTO: 2.4 10E3/UL (ref 0.8–5.3)
LYMPHOCYTES NFR BLD AUTO: 34 %
MAGNESIUM SERPL-MCNC: 1.9 MG/DL (ref 1.6–2.3)
MCH RBC QN AUTO: 30.8 PG (ref 26.5–33)
MCHC RBC AUTO-ENTMCNC: 34.4 G/DL (ref 31.5–36.5)
MCV RBC AUTO: 90 FL (ref 78–100)
MONOCYTES # BLD AUTO: 0.4 10E3/UL (ref 0–1.3)
MONOCYTES NFR BLD AUTO: 6 %
NEUTROPHILS # BLD AUTO: 4.1 10E3/UL (ref 1.6–8.3)
NEUTROPHILS NFR BLD AUTO: 59 %
NRBC # BLD AUTO: 0 10E3/UL
NRBC BLD AUTO-RTO: 0 /100
PLATELET # BLD AUTO: 95 10E3/UL (ref 150–450)
POTASSIUM BLD-SCNC: 4.8 MMOL/L (ref 3.4–5.3)
PROT SERPL-MCNC: 5.9 G/DL (ref 6.8–8.8)
RBC # BLD AUTO: 5.22 10E6/UL (ref 4.4–5.9)
SODIUM SERPL-SCNC: 136 MMOL/L (ref 133–144)
TACROLIMUS BLD-MCNC: 6 UG/L (ref 5–15)
TME LAST DOSE: NORMAL H
TME LAST DOSE: NORMAL H
TSH SERPL DL<=0.005 MIU/L-ACNC: 0.88 MU/L (ref 0.4–4)
WBC # BLD AUTO: 7 10E3/UL (ref 4–11)

## 2022-04-06 PROCEDURE — 82150 ASSAY OF AMYLASE: CPT

## 2022-04-06 PROCEDURE — 85025 COMPLETE CBC W/AUTO DIFF WBC: CPT

## 2022-04-06 PROCEDURE — 83735 ASSAY OF MAGNESIUM: CPT

## 2022-04-06 PROCEDURE — 36591 DRAW BLOOD OFF VENOUS DEVICE: CPT

## 2022-04-06 PROCEDURE — 99215 OFFICE O/P EST HI 40 MIN: CPT

## 2022-04-06 PROCEDURE — 80053 COMPREHEN METABOLIC PANEL: CPT

## 2022-04-06 PROCEDURE — G0463 HOSPITAL OUTPT CLINIC VISIT: HCPCS

## 2022-04-06 PROCEDURE — 80197 ASSAY OF TACROLIMUS: CPT

## 2022-04-06 PROCEDURE — 87799 DETECT AGENT NOS DNA QUANT: CPT

## 2022-04-06 PROCEDURE — 250N000011 HC RX IP 250 OP 636: Performed by: INTERNAL MEDICINE

## 2022-04-06 PROCEDURE — 84443 ASSAY THYROID STIM HORMONE: CPT

## 2022-04-06 PROCEDURE — 83615 LACTATE (LD) (LDH) ENZYME: CPT

## 2022-04-06 RX ORDER — HEPARIN SODIUM (PORCINE) LOCK FLUSH IV SOLN 100 UNIT/ML 100 UNIT/ML
5 SOLUTION INTRAVENOUS
Status: DISCONTINUED | OUTPATIENT
Start: 2022-04-06 | End: 2022-04-06 | Stop reason: HOSPADM

## 2022-04-06 RX ADMIN — Medication 5 ML: at 09:25

## 2022-04-06 ASSESSMENT — PAIN SCALES - GENERAL: PAINLEVEL: NO PAIN (0)

## 2022-04-06 NOTE — LETTER
4/6/2022         RE: Nik Nava  [address] [address] [id number]        Dear Colleague,    Thank you for referring your patient, Nik Nava, to the [facility]. Please see a copy of my visit note below.      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  Rash gone. Not using creams  Mouth without open lesions and overall has been okay. Using dex swish  Just saw optho and eyes looked good f/u 1yr  No new skin changes.  Struggling with sleep on steroids. Has tried multiple different regimens.     Review of Systems                                                                                                                                         12 point Review of systems was o/w negative     PHYSICAL EXAM                                                                                                                                                 KPS: 80  /82   Pulse 97   Temp 97.7  F (36.5  C) (Oral)   Resp 16   Wt 100.3 kg (221 lb 1.9 oz)   SpO2 96%   BMI 29.18 kg/m      Wt Readings from Last 4 Encounters:   04/06/22 100.3 kg (221 lb 1.9 oz)   03/31/22 98 kg (216 lb)   03/25/22 99.2 kg (218 lb 11.2 oz)   03/16/22 104.1 kg (229 lb 9.6 oz)      General: NAD.  HEENT: sclera anicteric.  No lichenoid changes or oral ulcers noted on buccal mucosa, inner lips or palate; minimal erythema posterior buccal adjacent to uppre molars.  Lungs:  CTA b/l   no wheezes  CV: RRR  no gallop  Abd; soft no tenderness; BS nl   Ext/ Skin: Hyperpigmentation noted on torso, for patient and wife resolving and fading, no residual rash  LE 1+ edema has on stockings.  Full ROM UE and LE; full ROM wrists, fingers, ankles  Neuro;  All CN intact; palate and face symmetric.    cGVHD therapy started on  February 24 2022  - Baseline NIH scoring 2/24/2022 : skin 2 maculopapular rash/erythema with no sclerotic features, mouth score 1 mild symptoms with lichenoid changes less than 25%, eyes score 2 moderate symptoms without new visual impairments due to KCS requiring lubricant eyedrops more than 3 times a day, overall mild scale for her provider  - 3/25/2022 1 month NIH treatment assessment on PQRST: skin 2, mouth score 1 mild symptoms with NO lichenoid changes, eyes score 2 moderate symptoms w requiring lubricant eyedrops more than 3 times a day, liver score 1 ALT 3.7x ULN and nl bilirubin   - 3/31  Mouth clear;  eyes minimal LFT still abn; no joint or new GI sx  - 4/6 Mouth clear, optho said eyes okay, LFT's slow improvement today, no new joint, skin, or GI symptoms.     ASSESSMENT AND PLAN   Nik Nava is a 61 year old male with Ph Pos ALL, day 239 s/p sib allo stem cell transplant    Day -6 (8/4): flu/cy  Day -5 through day -2 (8/5-8/8): flu  Day -1 (8/9): TBI  Day 0 (8/10): transplant     1.  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 S/p allo sib PBSCT. (ABO matched)  HCT-CI score: 3 (prior solid tumor)  Day +100 bone marrow biopsy is 100% donor with no morphologic or flow cytometric evidence of leukemia BCR abl is pending.  - Day +180 restaging BM bx- still in CR  100% donor;  2/16 BCR ABL major breakpoint undetectable.     2.  HEME:   - Transfuse for hgb <7g/dL; plt < 10k.  No transfusions needs  - Pulmonary emboli: He developed a pulmonary emboli in the setting of receiving PEG asparaginase (ie provoked thrombus).  Xarelto complete.     3.  FEN/Renal:  - Cr stable  - He has a history of renal cancer and resection. Has a single kidney.    4.    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 May require additional therapy.   #  Ocular GVHD: follows with ophthalmology. Maxitrol to lids, continue refreshing drops QID. Just seen by optho eyes look good. F/U 1yr.  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     - Cont tac to 1mg BID. Level pending.    -Steroid taper:  3/1-3/16: prednisone 100mg every day  3/17 75mg every day   3/31 75 alt with 50  4/6 In setting of viruses trying to reactivate, lots of mental/sleep steroid side effects, liver overall improved today will let him cut to 75 alt with 25mg for 2 weeks then reassess with goal to get to 0.5 mg/kg (eg 50 mg) every other day if taper is tolerated. Pending LFT's may require additional therapy pending what happens with pred taper today.     5.  ID: Afebrile  Otitis media 3/1: s/p levaquin.  Sudafed to dry up fluid behind ear.  PPx: ACV, levaquin, micafungin M/W/F. Pentamidine given 3/9. Requested for next week.   EBV viremia: pending.  CMV viremia: pending. Had not been started on therapy yet as level was 200 at last check it appears.   Tapering steroids as above. Will check IGG.  S/p covid vaccine series 12/2021  S/p shyamushcholo    6. Endo: Hx of graves disease; On synthroid 175 mcg daily. TSH normal today no reflex to T4.     7. CV: Norvasc: 2.5mg.    8. GI:   Omeprazole for heartburn  LFTs as above    9. Psych:   - Situational anxiety - lexapro 10mg daily.   - Insomnia: worse on steroids. Ativan, trazadone, melatonin, discussed dose optimization.     RTC: Tue-- follow up on LFTs (may need additional cgvh therapy), CMV/EBV, tac level    I spent 50 minutes in the care of this patient today, which included time necessary for preparation for the visit, obtaining history, ordering medications/tests/procedures as medically indicated, review of pertinent medical literature, counseling of the patient, communication of recommendations to the care team, and documentation time.    Mayra Cosby PA-C  [phone]        Again, thank you for allowing me to participate in the  care of your patient.      Sincerely,    BMT Advanced Practice Provider

## 2022-04-06 NOTE — PROGRESS NOTES
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  Rash gone. Not using creams  Mouth without open lesions and overall has been okay. Using dex swish  Just saw optho and eyes looked good f/u 1yr  No new skin changes.  Struggling with sleep on steroids. Has tried multiple different regimens.     Review of Systems                                                                                                                                         12 point Review of systems was o/w negative     PHYSICAL EXAM                                                                                                                                                 KPS: 80  /82   Pulse 97   Temp 97.7  F (36.5  C) (Oral)   Resp 16   Wt 100.3 kg (221 lb 1.9 oz)   SpO2 96%   BMI 29.18 kg/m      Wt Readings from Last 4 Encounters:   04/06/22 100.3 kg (221 lb 1.9 oz)   03/31/22 98 kg (216 lb)   03/25/22 99.2 kg (218 lb 11.2 oz)   03/16/22 104.1 kg (229 lb 9.6 oz)      General: NAD.  HEENT: sclera anicteric.  No lichenoid changes or oral ulcers noted on buccal mucosa, inner lips or palate; minimal erythema posterior buccal adjacent to uppre molars.  Lungs:  CTA b/l   no wheezes  CV: RRR  no gallop  Abd; soft no tenderness; BS nl   Ext/ Skin: Hyperpigmentation noted on torso, for patient and wife resolving and fading, no residual rash  LE 1+ edema has on stockings.  Full ROM UE and LE; full ROM wrists, fingers, ankles  Neuro;  All CN intact; palate and face symmetric.    cGVHD therapy started on February 24 2022  - Baseline NIH scoring 2/24/2022 : skin 2 maculopapular rash/erythema with no sclerotic features, mouth score 1 mild symptoms with lichenoid changes less than 25%, eyes score 2 moderate symptoms without new visual impairments due to KCS requiring lubricant eyedrops more  than 3 times a day, overall mild scale for her provider  - 3/25/2022 1 month NIH treatment assessment on PQRST: skin 2, mouth score 1 mild symptoms with NO lichenoid changes, eyes score 2 moderate symptoms w requiring lubricant eyedrops more than 3 times a day, liver score 1 ALT 3.7x ULN and nl bilirubin   - 3/31  Mouth clear;  eyes minimal LFT still abn; no joint or new GI sx  - 4/6 Mouth clear, optho said eyes okay, LFT's slow improvement today, no new joint, skin, or GI symptoms.     ASSESSMENT AND PLAN   Nik Nava is a 61 year old male with Ph Pos ALL, day 239 s/p sib allo stem cell transplant    Day -6 (8/4): flu/cy  Day -5 through day -2 (8/5-8/8): flu  Day -1 (8/9): TBI  Day 0 (8/10): transplant     1.  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 S/p allo sib PBSCT. (ABO matched)  HCT-CI score: 3 (prior solid tumor)  Day +100 bone marrow biopsy is 100% donor with no morphologic or flow cytometric evidence of leukemia BCR abl is pending.  - Day +180 restaging BM bx- still in CR  100% donor;  2/16 BCR ABL major breakpoint undetectable.     2.  HEME:   - Transfuse for hgb <7g/dL; plt < 10k.  No transfusions needs  - Pulmonary emboli: He developed a pulmonary emboli in the setting of receiving PEG asparaginase (ie provoked thrombus).  Xarelto complete.     3.  FEN/Renal:  - Cr stable  - He has a history of renal cancer and resection. Has a single kidney.    4.    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 May require additional therapy.   # Ocular GVHD: follows with ophthalmology. Maxitrol to lids, continue refreshing drops QID. Just seen by optho eyes look good. F/U 1yr.  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     - Cont tac to 1mg BID. Level pending.    -Steroid taper:  3/1-3/16: prednisone  100mg every day  3/17 75mg every day   3/31 75 alt with 50  4/6 In setting of viruses trying to reactivate, lots of mental/sleep steroid side effects, liver overall improved today will let him cut to 75 alt with 25mg for 2 weeks then reassess with goal to get to 0.5 mg/kg (eg 50 mg) every other day if taper is tolerated. Pending LFT's may require additional therapy pending what happens with pred taper today.     5.  ID: Afebrile  Otitis media 3/1: s/p levaquin.  Sudafed to dry up fluid behind ear.  PPx: ACV, levaquin, micafungin M/W/F. Pentamidine given 3/9. Requested for next week.   EBV viremia: pending.  CMV viremia: pending. Had not been started on therapy yet as level was 200 at last check it appears.   Tapering steroids as above. Will check IGG.  S/p covid vaccine series 12/2021  S/p evusheld    6. Endo: Hx of graves disease; On synthroid 175 mcg daily. TSH normal today no reflex to T4.     7. CV: Norvasc: 2.5mg.    8. GI:   Omeprazole for heartburn  LFTs as above    9. Psych:   - Situational anxiety - lexapro 10mg daily.   - Insomnia: worse on steroids. Ativan, trazadone, melatonin, discussed dose optimization.     RTC: Tue-- follow up on LFTs (may need additional cgvh therapy), CMV/EBV, tac level    I spent 50 minutes in the care of this patient today, which included time necessary for preparation for the visit, obtaining history, ordering medications/tests/procedures as medically indicated, review of pertinent medical literature, counseling of the patient, communication of recommendations to the care team, and documentation time.    Mayra Cosby PA-C  [phone]

## 2022-04-06 NOTE — NURSING NOTE
Pt states he's been feeling dizzy and unsteady on his feet. HR went from 76 sitting to 97 standing. BP relatively unchanged. JENA Portillo paged to notify.     Chief Complaint   Patient presents with     Oncology Clinic Visit     Acute lymphoblastic leukemia      Raquel Landa RN

## 2022-04-06 NOTE — NURSING NOTE
"Oncology Rooming Note    April 6, 2022 9:56 AM   Nik Nava is a 61 year old male who presents for:    Chief Complaint   Patient presents with     Oncology Clinic Visit     Acute lymphoblastic leukemia      Port Draw     Labs drawn by RN in Lab from Right Chest Port-a-Cath. Line flushed with Saline and Heparin.      Initial Vitals: /82   Pulse 97   Temp 97.7  F (36.5  C) (Oral)   Resp 16   Wt 100.3 kg (221 lb 1.9 oz)   SpO2 96%   BMI 29.18 kg/m   Estimated body mass index is 29.18 kg/m  as calculated from the following:    Height as of 3/25/22: 1.854 m (6' 0.99\").    Weight as of this encounter: 100.3 kg (221 lb 1.9 oz). Body surface area is 2.27 meters squared.  No Pain (0) Comment: Data Unavailable   No LMP for male patient.  Allergies reviewed: Yes  Medications reviewed: Yes    Medications: Medication refills not needed today.  Pharmacy name entered into [facility]:    [facility] - [place], [place] - [id number] [facility] [place], [place] - [phone]    Clinical concerns: questions on Sleep vs sleep medications and the best way to get the most restful night sleep.     Fell due to equilibrium being off.       Carmen Gottlieb LPN            "

## 2022-04-07 LAB
CMV DNA IU/ML, INSTRUMENT: 779 IU/ML
CMV DNA SPEC NAA+PROBE-LOG#: 2.9 {LOG_COPIES}/ML
EBV DNA COPIES/ML, INSTRUMENT: ABNORMAL COPIES/ML
EBV DNA SPEC NAA+PROBE-LOG#: 4.6 {LOG_COPIES}/ML

## 2022-04-08 ENCOUNTER — HOME INFUSION (PRE-WILLOW HOME INFUSION) (OUTPATIENT)
Dept: PHARMACY | Facility: CLINIC | Age: 62
End: 2022-04-08
Payer: COMMERCIAL

## 2022-04-11 NOTE — PROGRESS NOTES
This is a recent snapshot of the patient's [facility] medical record.  For current drug dose and complete information and questions, call [phone] or In Basket pool, [facility] ([id number])  CSN Number:  [id number]

## 2022-04-11 NOTE — PROGRESS NOTES
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    HPI:   No new rashes except for small area in the groin that is felt related to moisture and possible fungal skin infection, improving. Mouth without open lesions and overall has been okay, currently using dex swish.  Just saw optho and eyes looked good f/u 1yr. Struggling with sleep on steroids. Has tried multiple different regimens without effects. Now developed new gait imbalance/instability that is worse over the past week (Had 3 falls related to instability).    Review of Systems                                                                                                                                     12 point Review of systems was o/w negative     PHYSICAL EXAM                                                                                                                                                 KPS: 80  /88 (BP Location: Right arm, Patient Position: Sitting, Cuff Size: Adult Regular)   Pulse 88   Temp 97.7  F (36.5  C) (Oral)   Resp 18   Wt 99.7 kg (219 lb 14.4 oz)   SpO2 97%   BMI 29.02 kg/m      Wt Readings from Last 4 Encounters:   04/12/22 99.7 kg (219 lb 14.4 oz)   04/06/22 100.3 kg (221 lb 1.9 oz)   03/31/22 98 kg (216 lb)   03/25/22 99.2 kg (218 lb 11.2 oz)      General: NAD.  HEENT: sclera anicteric. Normal TM both ears with no discharge or erythema.  No lichenoid changes or oral ulcers noted on buccal mucosa, inner lips or palate  Lungs: CTA b/l. no wheezes.  CV: RRR. no gallop.  Abd: soft no tenderness; BS nl   Ext/ Skin: Mild hyperpigmentation noted on torso, for patient and wife resolving and fading, no residual rash. LE 1+ edema has on stockings. Small area of erythematous rashes with satellite lesions in both groins.  Full ROM UE and LE; full ROM wrists, fingers, ankles  Neuro: All CN intact; palate  and face symmetric. EOMI/PERRL, no nystagmus. Motor power grade 5/5 throughout. Resting and intention tremor. Intact FTNTF test.    cGVHD therapy started on February 24 2022  - Baseline NIH scoring 2/24/2022 : skin 2 maculopapular rash/erythema with no sclerotic features, mouth score 1 mild symptoms with lichenoid changes less than 25%, eyes score 2 moderate symptoms without new visual impairments due to KCS requiring lubricant eyedrops more than 3 times a day, overall mild scale for her provider  - 3/25/2022 1 month NIH treatment assessment on PQRST: skin 2, mouth score 1 mild symptoms with NO lichenoid changes, eyes score 2 moderate symptoms w requiring lubricant eyedrops more than 3 times a day, liver score 1 ALT 3.7x ULN and nl bilirubin   - 3/31  Mouth clear;  eyes minimal LFT still abn; no joint or new GI sx  - 4/6 Mouth clear, optho said eyes okay, LFT's slow improvement today, no new joint, skin, or GI symptoms.   - 4/12 Mouth/eyes clear. LFT stable. No new joint, skin, or GI symptoms.     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 S/p allo sib PBSCT. (ABO matched)  HCT-CI score: 3 (prior solid tumor)  Day +100 bone marrow biopsy is 100% donor with no morphologic or flow cytometric evidence of leukemia BCR abl is pending.  - Day +180 restaging BM bx - still in CR  100% donor;  2/16 BCR ABL major breakpoint undetectable.     2.  HEME:   Thrombocytopenia  Worsening thrombocytopenia over the last few visit, today (4/12) is 61k (has been 120-130k previously but also was in the range of 80k-90k). Differentials include side-effects from tacrolimus, EBV/CMV reactivation (in the setting of high-dose steroids), or relapsed ALL.  - Tacrolimus level,  EBV, CMV (4/12) - pending  - Send BCR ABL major breakpoint today (History of p190 fusion)    - Transfuse for hgb <7g/dL; plt < 10k.  No transfusions needs  - Pulmonary emboli: He developed a pulmonary emboli in the setting of receiving PEG asparaginase (ie provoked thrombus).  Xarelto complete.     3.  FEN/Renal:  - Cr stable  - He has a history of renal cancer and resection. Has a single kidney.    4.  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   # Ocular GVHD: follows with ophthalmology. Maxitrol to lids, continue refreshing drops QID. Just seen by optho eyes look good. F/U 1yr.  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     - Cont tac to 1mg BID. Level pending.    -Steroid taper:  3/1-3/16: prednisone 100mg every day  3/17 75mg every day   3/31 75 alt with 50  4/6 In setting of viruses trying to reactivate, lots of mental/sleep steroid side effects, liver overall improved today will let him cut to 75 alt with 25mg for 2 weeks then reassess with goal to get to 0.5 mg/kg (eg 50 mg) every other day if taper is tolerated. Pending LFT's may require additional therapy pending what happens with pred taper today.   4/12 Dose reduced to 60 mg daily alternating with 25 mg daily until next follow-up    5.  ID: Afebrile  Otitis media 3/1: s/p levaquin. Sudafed to dry up fluid behind ear.  PPx: ACV, levaquin, micafungin M/W/F. Pentamidine given 3/9.  Reactivation of CMV/EBV in the setting of high-dose steroids.  EBV viremia: pending  CMV viremia: pending  Tapering steroids as above. Will check IGG.  S/p covid vaccine series 12/2021  S/p evusheld    6. Endo: Hx of graves disease; On synthroid 175 mcg daily. Recent TSH normal.     7. CV: Norvasc: 5 mg daily    8. GI:   Omeprazole for heartburn  Following LFTs for liver GVHD as above    9.  Neuro/Psych:   Gait instability, possible vestibular etiology or steroid myopathy  - PT and ENT referral placed  - Steroid taper as above  - Situational anxiety - lexapro 10mg daily.   - Insomnia: worse on steroids. Ativan, trazadone, melatonin, discussed dose optimization. Anticipate improvement with tapering steroids.    RTC: DOM clinic Friday 4/15/2022 with CBC and CMP. Follow on results for Tacrolimus, EBV and CMV viral load, and BCR-ABL    Rohan Joshi MD  Internal Medicine PGY-3  P: [phone]

## 2022-04-12 ENCOUNTER — APPOINTMENT (OUTPATIENT)
Dept: LAB | Facility: CLINIC | Age: 62
End: 2022-04-12
Attending: INTERNAL MEDICINE
Payer: COMMERCIAL

## 2022-04-12 ENCOUNTER — ONCOLOGY VISIT (OUTPATIENT)
Dept: TRANSPLANT | Facility: CLINIC | Age: 62
End: 2022-04-12
Attending: INTERNAL MEDICINE
Payer: COMMERCIAL

## 2022-04-12 VITALS
HEART RATE: 88 BPM | WEIGHT: 219.9 LBS | DIASTOLIC BLOOD PRESSURE: 88 MMHG | SYSTOLIC BLOOD PRESSURE: 122 MMHG | BODY MASS INDEX: 29.02 KG/M2 | OXYGEN SATURATION: 97 % | RESPIRATION RATE: 18 BRPM | TEMPERATURE: 97.7 F

## 2022-04-12 DIAGNOSIS — T38.0X5A STEROID-INDUCED MYOPATHY: Primary | ICD-10-CM

## 2022-04-12 DIAGNOSIS — Z94.81 STATUS POST BONE MARROW TRANSPLANT (H): ICD-10-CM

## 2022-04-12 DIAGNOSIS — Z94.81 STATUS POST BONE MARROW TRANSPLANT (H): Primary | ICD-10-CM

## 2022-04-12 DIAGNOSIS — T86.09 GVHD AS COMPLICATION OF BONE MARROW TRANSPLANT (H): ICD-10-CM

## 2022-04-12 DIAGNOSIS — G72.0 STEROID-INDUCED MYOPATHY: Primary | ICD-10-CM

## 2022-04-12 DIAGNOSIS — H83.2X9 VESTIBULAR DISEQUILIBRIUM, UNSPECIFIED LATERALITY: ICD-10-CM

## 2022-04-12 DIAGNOSIS — C91.01 ACUTE LYMPHOBLASTIC LEUKEMIA (ALL) IN REMISSION (H): ICD-10-CM

## 2022-04-12 DIAGNOSIS — D89.813 GVHD AS COMPLICATION OF BONE MARROW TRANSPLANT (H): ICD-10-CM

## 2022-04-12 LAB
ALBUMIN SERPL-MCNC: 2.5 G/DL (ref 3.4–5)
ALP SERPL-CCNC: 260 U/L (ref 40–150)
ALT SERPL W P-5'-P-CCNC: 159 U/L (ref 0–70)
ANION GAP SERPL CALCULATED.3IONS-SCNC: 7 MMOL/L (ref 3–14)
AST SERPL W P-5'-P-CCNC: ABNORMAL U/L
BASOPHILS # BLD AUTO: 0 10E3/UL (ref 0–0.2)
BASOPHILS NFR BLD AUTO: 0 %
BILIRUB SERPL-MCNC: 0.8 MG/DL (ref 0.2–1.3)
BUN SERPL-MCNC: 35 MG/DL (ref 7–30)
CALCIUM SERPL-MCNC: 9.2 MG/DL (ref 8.5–10.1)
CHLORIDE BLD-SCNC: 99 MMOL/L (ref 94–109)
CMV DNA IU/ML, INSTRUMENT: 1041 IU/ML
CMV DNA SPEC NAA+PROBE-LOG#: 3 {LOG_COPIES}/ML
CO2 SERPL-SCNC: 28 MMOL/L (ref 20–32)
CREAT SERPL-MCNC: 0.95 MG/DL (ref 0.66–1.25)
EOSINOPHIL # BLD AUTO: 0 10E3/UL (ref 0–0.7)
EOSINOPHIL NFR BLD AUTO: 0 %
ERYTHROCYTE [DISTWIDTH] IN BLOOD BY AUTOMATED COUNT: 19.2 % (ref 10–15)
GFR SERPL CREATININE-BSD FRML MDRD: 90 ML/MIN/1.73M2
GLUCOSE BLD-MCNC: 101 MG/DL (ref 70–99)
HCT VFR BLD AUTO: 45.7 % (ref 40–53)
HGB BLD-MCNC: 16 G/DL (ref 13.3–17.7)
IGG SERPL-MCNC: 859 MG/DL (ref 610–1616)
IMM GRANULOCYTES # BLD: 0.1 10E3/UL
IMM GRANULOCYTES NFR BLD: 1 %
LAB DIRECTOR COMMENTS: NORMAL
LAB DIRECTOR DISCLAIMER: NORMAL
LAB DIRECTOR INTERPRETATION: NORMAL
LAB DIRECTOR METHODOLOGY: NORMAL
LAB DIRECTOR RESULTS: NORMAL
LYMPHOCYTES # BLD AUTO: 2.6 10E3/UL (ref 0.8–5.3)
LYMPHOCYTES NFR BLD AUTO: 45 %
MAGNESIUM SERPL-MCNC: 1.6 MG/DL (ref 1.6–2.3)
MCH RBC QN AUTO: 30.7 PG (ref 26.5–33)
MCHC RBC AUTO-ENTMCNC: 35 G/DL (ref 31.5–36.5)
MCV RBC AUTO: 88 FL (ref 78–100)
MONOCYTES # BLD AUTO: 0.5 10E3/UL (ref 0–1.3)
MONOCYTES NFR BLD AUTO: 9 %
NEUTROPHILS # BLD AUTO: 2.6 10E3/UL (ref 1.6–8.3)
NEUTROPHILS NFR BLD AUTO: 45 %
NRBC # BLD AUTO: 0 10E3/UL
NRBC BLD AUTO-RTO: 0 /100
PLATELET # BLD AUTO: 61 10E3/UL (ref 150–450)
POTASSIUM BLD-SCNC: 4.6 MMOL/L (ref 3.4–5.3)
PROT SERPL-MCNC: 6.2 G/DL (ref 6.8–8.8)
RBC # BLD AUTO: 5.22 10E6/UL (ref 4.4–5.9)
SODIUM SERPL-SCNC: 134 MMOL/L (ref 133–144)
SPECIMEN DESCRIPTION: NORMAL
TACROLIMUS BLD-MCNC: 2.9 UG/L (ref 5–15)
TME LAST DOSE: ABNORMAL H
TME LAST DOSE: ABNORMAL H
WBC # BLD AUTO: 5.9 10E3/UL (ref 4–11)

## 2022-04-12 PROCEDURE — 80197 ASSAY OF TACROLIMUS: CPT

## 2022-04-12 PROCEDURE — G0463 HOSPITAL OUTPT CLINIC VISIT: HCPCS

## 2022-04-12 PROCEDURE — 36591 DRAW BLOOD OFF VENOUS DEVICE: CPT

## 2022-04-12 PROCEDURE — 87799 DETECT AGENT NOS DNA QUANT: CPT

## 2022-04-12 PROCEDURE — 81207 BCR/ABL1 GENE MINOR BP: CPT

## 2022-04-12 PROCEDURE — 84155 ASSAY OF PROTEIN SERUM: CPT

## 2022-04-12 PROCEDURE — 250N000011 HC RX IP 250 OP 636: Performed by: INTERNAL MEDICINE

## 2022-04-12 PROCEDURE — G0452 MOLECULAR PATHOLOGY INTERPR: HCPCS | Mod: 26 | Performed by: PATHOLOGY

## 2022-04-12 PROCEDURE — 94640 AIRWAY INHALATION TREATMENT: CPT | Performed by: INTERNAL MEDICINE

## 2022-04-12 PROCEDURE — 99215 OFFICE O/P EST HI 40 MIN: CPT | Mod: GC

## 2022-04-12 PROCEDURE — 83735 ASSAY OF MAGNESIUM: CPT

## 2022-04-12 PROCEDURE — 82784 ASSAY IGA/IGD/IGG/IGM EACH: CPT

## 2022-04-12 PROCEDURE — 85025 COMPLETE CBC W/AUTO DIFF WBC: CPT

## 2022-04-12 PROCEDURE — 94642 AEROSOL INHALATION TREATMENT: CPT | Performed by: INTERNAL MEDICINE

## 2022-04-12 RX ORDER — TRAZODONE HYDROCHLORIDE 50 MG/1
50 TABLET, FILM COATED ORAL AT BEDTIME
COMMUNITY
End: 2022-04-22

## 2022-04-12 RX ORDER — HEPARIN SODIUM,PORCINE 10 UNIT/ML
5 VIAL (ML) INTRAVENOUS
Status: CANCELLED | OUTPATIENT
Start: 2022-05-07

## 2022-04-12 RX ORDER — PREDNISONE 20 MG/1
60 TABLET ORAL EVERY OTHER DAY
Qty: 90 TABLET | Refills: 1 | Status: SHIPPED | OUTPATIENT
Start: 2022-04-12 | End: 2022-08-18

## 2022-04-12 RX ORDER — HEPARIN SODIUM (PORCINE) LOCK FLUSH IV SOLN 100 UNIT/ML 100 UNIT/ML
5 SOLUTION INTRAVENOUS
Status: CANCELLED | OUTPATIENT
Start: 2022-05-07

## 2022-04-12 RX ORDER — ALBUTEROL SULFATE 5 MG/ML
2.5 SOLUTION RESPIRATORY (INHALATION) EVERY 6 HOURS PRN
Status: DISCONTINUED | OUTPATIENT
Start: 2022-04-12 | End: 2022-04-12 | Stop reason: HOSPADM

## 2022-04-12 RX ORDER — HEPARIN SODIUM (PORCINE) LOCK FLUSH IV SOLN 100 UNIT/ML 100 UNIT/ML
5 SOLUTION INTRAVENOUS ONCE
Status: COMPLETED | OUTPATIENT
Start: 2022-04-12 | End: 2022-04-12

## 2022-04-12 RX ORDER — PENTAMIDINE ISETHIONATE 300 MG/300MG
300 INHALANT RESPIRATORY (INHALATION)
Status: DISCONTINUED | OUTPATIENT
Start: 2022-04-12 | End: 2022-04-12 | Stop reason: HOSPADM

## 2022-04-12 RX ORDER — ALBUTEROL SULFATE 5 MG/ML
2.5 SOLUTION RESPIRATORY (INHALATION) EVERY 6 HOURS PRN
Status: CANCELLED
Start: 2022-05-07

## 2022-04-12 RX ORDER — PENTAMIDINE ISETHIONATE 300 MG/300MG
300 INHALANT RESPIRATORY (INHALATION)
Status: CANCELLED
Start: 2022-05-07

## 2022-04-12 RX ADMIN — PENTAMIDINE ISETHIONATE 300 MG: 300 INHALANT RESPIRATORY (INHALATION) at 10:46

## 2022-04-12 RX ADMIN — Medication 5 ML: at 09:43

## 2022-04-12 ASSESSMENT — PAIN SCALES - GENERAL: PAINLEVEL: NO PAIN (0)

## 2022-04-12 NOTE — NURSING NOTE
"Oncology Rooming Note    April 12, 2022 12:20 PM   Nik Nava is a 61 year old male who presents for:    Chief Complaint   Patient presents with     Port Draw     Labs drawn via port by RN in lab. VS taken.      Oncology Clinic Visit     Rtn for status post bone marrow transplant     Initial Vitals: /88 (BP Location: Right arm, Patient Position: Sitting, Cuff Size: Adult Regular)   Pulse 88   Temp 97.7  F (36.5  C) (Oral)   Resp 18   Wt 99.7 kg (219 lb 14.4 oz)   SpO2 97%   BMI 29.02 kg/m   Estimated body mass index is 29.02 kg/m  as calculated from the following:    Height as of 3/25/22: 1.854 m (6' 0.99\").    Weight as of this encounter: 99.7 kg (219 lb 14.4 oz). Body surface area is 2.27 meters squared.  No Pain (0) Comment: Data Unavailable   No LMP for male patient.  Allergies reviewed: Yes  Medications reviewed: Yes    Medications: Medication refills not needed today.   Pharmacy name entered into [employer]:    [facility] - [place], [place] - [address] [place], [place] - [address] [place] [address] [id number]    Clinical concerns: Pt says his equilibrium still feels off, and experienced a fall recently, Saturday 4/9 (4 total now). Pt reports drooling for last 3-4 weeks, and a rash on right side of groin. Bilateral hand cramping.    Pt says he had an ear infection. Pt is unable to remember exact time of onset of difficulties with equilibrium, but spouse believes it was concurrent with infection. Infection first looked at 3/1/22 by JENA Porras. Medication was prescribed and ear was later examined by Dr. Hill who reported it appeared cleared up (4/6), but issues with equilibrium have persisted.  MD was notified.      Christin Rose, EMT            "

## 2022-04-12 NOTE — PROGRESS NOTES
Patient was seen today for a Pentamidine nebulizer tx ordered by     Patient was first given 2.5 mg of Albuterol nebulizer:  NDC [id number]  LOT  [id number]  EXP  01/11/2022  This writer was not able to chart this in the MAR due to ordering error    After which Pentamidine 300 mg (Lot # [id number]) mixed with 6cc Sterile H20 was administered through a filtered nebulizer.    Pre-treatment: SpO2 =95%   HR =88   BBS = clear   Post-treatment: SpO2 =98%  HR =97  BBS = clear  No adverse side effects noted by the patient.    This service today was provided under the direct supervision of , who was available if needed.     Procedure was completed by Marla Miller.

## 2022-04-12 NOTE — LETTER
4/12/2022         RE: Nik Nava  [address] [facility] [id number]        Dear Colleague,    Thank you for referring your patient, Nik Nava, to the [facility] [place]. Please see a copy of my visit note below.      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    HPI:   No new rashes except for small area in the groin that is felt related to moisture and possible fungal skin infection, improving. Mouth without open lesions and overall has been okay, currently using dex swish.  Just saw optho and eyes looked good f/u 1yr. Struggling with sleep on steroids. Has tried multiple different regimens without effects. Now developed new gait imbalance/instability that is worse over the past week (Had 3 falls related to instability).    Review of Systems                                                                                                                                     12 point Review of systems was o/w negative     PHYSICAL EXAM                                                                                                                                                 KPS: 80  /88 (BP Location: Right arm, Patient Position: Sitting, Cuff Size: Adult Regular)   Pulse 88   Temp 97.7  F (36.5  C) (Oral)   Resp 18   Wt 99.7 kg (219 lb 14.4 oz)   SpO2 97%   BMI 29.02 kg/m      Wt Readings from Last 4 Encounters:   04/12/22 99.7 kg (219 lb 14.4 oz)   04/06/22 100.3 kg (221 lb 1.9 oz)   03/31/22 98 kg (216 lb)   03/25/22 99.2 kg (218 lb 11.2 oz)      General: NAD.  HEENT: sclera anicteric. Normal TM both ears with no discharge or erythema.  No lichenoid changes or oral ulcers noted on buccal mucosa, inner lips or palate  Lungs: CTA b/l. no wheezes.  CV: RRR. no gallop.  Abd: soft no tenderness; BS nl   Ext/ Skin:  Mild hyperpigmentation noted on torso, for patient and wife resolving and fading, no residual rash. LE 1+ edema has on stockings. Small area of erythematous rashes with satellite lesions in both groins.  Full ROM UE and LE; full ROM wrists, fingers, ankles  Neuro: All CN intact; palate and face symmetric. EOMI/PERRL, no nystagmus. Motor power grade 5/5 throughout. Resting and intention tremor. Intact FTNTF test.    cGVHD therapy started on February 24 2022  - Baseline NIH scoring 2/24/2022 : skin 2 maculopapular rash/erythema with no sclerotic features, mouth score 1 mild symptoms with lichenoid changes less than 25%, eyes score 2 moderate symptoms without new visual impairments due to KCS requiring lubricant eyedrops more than 3 times a day, overall mild scale for her provider  - 3/25/2022 1 month NIH treatment assessment on PQRST: skin 2, mouth score 1 mild symptoms with NO lichenoid changes, eyes score 2 moderate symptoms w requiring lubricant eyedrops more than 3 times a day, liver score 1 ALT 3.7x ULN and nl bilirubin   - 3/31  Mouth clear;  eyes minimal LFT still abn; no joint or new GI sx  - 4/6 Mouth clear, optho said eyes okay, LFT's slow improvement today, no new joint, skin, or GI symptoms.   - 4/12 Mouth/eyes clear. LFT stable. No new joint, skin, or GI symptoms.     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 S/p allo sib PBSCT. (ABO matched)  HCT-CI score: 3 (prior solid tumor)  Day +100 bone marrow biopsy is 100% donor with no morphologic or flow cytometric evidence of leukemia BCR abl is pending.  - Day +180 restaging BM bx - still in CR  100% donor;  2/16 BCR ABL major breakpoint undetectable.     2.  HEME:    Thrombocytopenia  Worsening thrombocytopenia over the last few visit, today (4/12) is 61k (has been 120-130k previously but also was in the range of 80k-90k). Differentials include side-effects from tacrolimus, EBV/CMV reactivation (in the setting of high-dose steroids), or relapsed ALL.  - Tacrolimus level, EBV, CMV (4/12) - pending  - Send BCR ABL major breakpoint today (History of p190 fusion)    - Transfuse for hgb <7g/dL; plt < 10k.  No transfusions needs  - Pulmonary emboli: He developed a pulmonary emboli in the setting of receiving PEG asparaginase (ie provoked thrombus).  Xarelto complete.     3.  FEN/Renal:  - Cr stable  - He has a history of renal cancer and resection. Has a single kidney.    4.  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   # Ocular GVHD: follows with ophthalmology. Maxitrol to lids, continue refreshing drops QID. Just seen by optho eyes look good. F/U 1yr.  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     - Cont tac to 1mg BID. Level pending.    -Steroid taper:  3/1-3/16: prednisone 100mg every day  3/17 75mg every day   3/31 75 alt with 50  4/6 In setting of viruses trying to reactivate, lots of mental/sleep steroid side effects, liver overall improved today will let him cut to 75 alt with 25mg for 2 weeks then reassess with goal to get to 0.5 mg/kg (eg 50 mg) every other day if taper is tolerated. Pending LFT's may require additional therapy pending what happens with pred taper today.   4/12 Dose reduced to 60 mg daily alternating with 25 mg daily until next follow-up    5.  ID: Afebrile  Otitis media 3/1: s/p levaquin. Sudafed to dry up fluid behind ear.  PPx: ACV, levaquin, micafungin M/W/F. Pentamidine given 3/9.  Reactivation of CMV/EBV in the setting of high-dose steroids.  EBV viremia: pending  CMV  viremia: pending  Tapering steroids as above. Will check IGG.  S/p covid vaccine series 12/2021  S/p shyamusheld    6. Endo: Hx of graves disease; On synthroid 175 mcg daily. Recent TSH normal.     7. CV: Norvasc: 5 mg daily    8. GI:   Omeprazole for heartburn  Following LFTs for liver GVHD as above    9. Neuro/Psych:   Gait instability, possible vestibular etiology or steroid myopathy  - PT and ENT referral placed  - Steroid taper as above  - Situational anxiety - lexapro 10mg daily.   - Insomnia: worse on steroids. Ativan, trazadone, melatonin, discussed dose optimization. Anticipate improvement with tapering steroids.    RTC: DOM clinic Friday 4/15/2022 with CBC and CMP. Follow on results for Tacrolimus, EBV and CMV viral load, and BCR-ABL    Rohan Joshi MD  Internal Medicine PGY-3  P: [phone]    Attestation signed by Cameron Grimaldo MD at 4/12/2022  4:14 PM:  I saw and examined the patient with Dr. Joshi, PGY3.  His note reflects our mutual assessment and plan.  This patient is 245 days from an allogenic transplant.  Over the last 1 to 2 weeks he has had some episodes where he lost balance and fell.  He is weaker.  He did have one episode that the room, Lime around him.  He has a history of a recent ear infection.  We also noticed that there is a drop in his platelet count over the last 5 visits.  The tacrolimus level has been low but therapeutic.  Today's level is pending.  The patient is on prednisone 75 mg alternating with 25.  When he tries to get up and move around he is clearly weaker although he does not think so.  The remainder of the exam is largely unremarkable as documented in the resident's note.  Our plan for today is to send a peripheral blood BCR-ABL P1 90, which is the one documented to be positive at the time of diagnosis.  In addition we will wait for the tacrolimus level to see if it is elevated.  We recommended and the patient agreed to be referred to physical  therapy.  He will try to set up the physical therapy closer to home.  We will taper his prednisone to 60 mg alternating with 25 mg every other day.  Last because of the symptoms of spinning we recommended and he agrees to see a ENT doctor.  The patient will be returning to clinic on Friday to follow-up on the results and on his clinical symptoms.  If there is continued drop in his platelets it would probably be advisable to discontinue the tacrolimus and start him on sirolimus in replacement.  Patient and wife aware to call and come sooner if needed.              Again, thank you for allowing me to participate in the care of your patient.      Sincerely,    BMT DOM

## 2022-04-12 NOTE — NURSING NOTE
Chief Complaint   Patient presents with     Port Draw     Labs drawn via port by RN in lab. VS taken.      Port de-accessed and re-accessed with 20 gauge 3/4 inch flat needle and labs drawn by RN.  Port flushed with saline and heparin.  Pt tolerated well.  VS taken.      Tamie Pruett, RN

## 2022-04-12 NOTE — PROGRESS NOTES
This is a recent snapshot of the patient's [facility] medical record.  For current drug dose and complete information and questions, call [phone] or In Basket pool, [facility] ([id number])  CSN Number:  [id number]

## 2022-04-13 ENCOUNTER — HOME INFUSION (PRE-WILLOW HOME INFUSION) (OUTPATIENT)
Dept: PHARMACY | Facility: CLINIC | Age: 62
End: 2022-04-13

## 2022-04-13 LAB
EBV DNA COPIES/ML, INSTRUMENT: ABNORMAL COPIES/ML
EBV DNA SPEC NAA+PROBE-LOG#: 4.7 {LOG_COPIES}/ML

## 2022-04-14 ENCOUNTER — TELEPHONE (OUTPATIENT)
Dept: OTOLARYNGOLOGY | Facility: CLINIC | Age: 62
End: 2022-04-14
Payer: COMMERCIAL

## 2022-04-14 ENCOUNTER — TELEPHONE (OUTPATIENT)
Dept: TRANSPLANT | Facility: CLINIC | Age: 62
End: 2022-04-14
Payer: COMMERCIAL

## 2022-04-14 ENCOUNTER — HOME INFUSION (PRE-WILLOW HOME INFUSION) (OUTPATIENT)
Dept: PHARMACY | Facility: CLINIC | Age: 62
End: 2022-04-14
Payer: COMMERCIAL

## 2022-04-14 NOTE — TELEPHONE ENCOUNTER
Received a voicemail from Nik. His wife has shingles on her scalp and he was wondering if he needs to do anything special to protect himself with his history of BMT. Message sent to Dr Hill. Per YESSI, nothing in particular as patient is already on acyclovir and he should continue to take that. Per Nik, the shingles are not open, his wife is on valtrex, and they are trying to stay away from each other as best as they can.

## 2022-04-15 ENCOUNTER — APPOINTMENT (OUTPATIENT)
Dept: LAB | Facility: CLINIC | Age: 62
End: 2022-04-15
Attending: INTERNAL MEDICINE
Payer: COMMERCIAL

## 2022-04-15 ENCOUNTER — ONCOLOGY VISIT (OUTPATIENT)
Dept: TRANSPLANT | Facility: CLINIC | Age: 62
End: 2022-04-15
Attending: INTERNAL MEDICINE
Payer: COMMERCIAL

## 2022-04-15 VITALS
OXYGEN SATURATION: 96 % | TEMPERATURE: 98.2 F | BODY MASS INDEX: 29.36 KG/M2 | RESPIRATION RATE: 18 BRPM | WEIGHT: 222.5 LBS | SYSTOLIC BLOOD PRESSURE: 129 MMHG | DIASTOLIC BLOOD PRESSURE: 82 MMHG | HEART RATE: 86 BPM

## 2022-04-15 DIAGNOSIS — T86.09 GVHD AS COMPLICATION OF BONE MARROW TRANSPLANT (H): ICD-10-CM

## 2022-04-15 DIAGNOSIS — D89.813 GVHD AS COMPLICATION OF BONE MARROW TRANSPLANT (H): ICD-10-CM

## 2022-04-15 DIAGNOSIS — B25.9 CYTOMEGALOVIRUS (CMV) VIREMIA (H): ICD-10-CM

## 2022-04-15 DIAGNOSIS — C91.01 ACUTE LYMPHOBLASTIC LEUKEMIA (ALL) IN REMISSION (H): Primary | ICD-10-CM

## 2022-04-15 DIAGNOSIS — Z94.81 STATUS POST BONE MARROW TRANSPLANT (H): ICD-10-CM

## 2022-04-15 LAB
ALBUMIN SERPL-MCNC: 2.5 G/DL (ref 3.4–5)
ALP SERPL-CCNC: 228 U/L (ref 40–150)
ALT SERPL W P-5'-P-CCNC: 132 U/L (ref 0–70)
ANION GAP SERPL CALCULATED.3IONS-SCNC: 6 MMOL/L (ref 3–14)
AST SERPL W P-5'-P-CCNC: ABNORMAL U/L
BASOPHILS # BLD AUTO: 0 10E3/UL (ref 0–0.2)
BASOPHILS NFR BLD AUTO: 0 %
BILIRUB SERPL-MCNC: 0.6 MG/DL (ref 0.2–1.3)
BUN SERPL-MCNC: 34 MG/DL (ref 7–30)
CALCIUM SERPL-MCNC: 9.3 MG/DL (ref 8.5–10.1)
CHLORIDE BLD-SCNC: 101 MMOL/L (ref 94–109)
CO2 SERPL-SCNC: 29 MMOL/L (ref 20–32)
CREAT SERPL-MCNC: 0.98 MG/DL (ref 0.66–1.25)
EOSINOPHIL # BLD AUTO: 0 10E3/UL (ref 0–0.7)
EOSINOPHIL NFR BLD AUTO: 0 %
ERYTHROCYTE [DISTWIDTH] IN BLOOD BY AUTOMATED COUNT: 19 % (ref 10–15)
GFR SERPL CREATININE-BSD FRML MDRD: 87 ML/MIN/1.73M2
GLUCOSE BLD-MCNC: 82 MG/DL (ref 70–99)
HCT VFR BLD AUTO: 43.3 % (ref 40–53)
HGB BLD-MCNC: 15.3 G/DL (ref 13.3–17.7)
HOLD SPECIMEN: NORMAL
IMM GRANULOCYTES # BLD: 0.1 10E3/UL
IMM GRANULOCYTES NFR BLD: 1 %
LYMPHOCYTES # BLD AUTO: 3.1 10E3/UL (ref 0.8–5.3)
LYMPHOCYTES NFR BLD AUTO: 47 %
MCH RBC QN AUTO: 30.6 PG (ref 26.5–33)
MCHC RBC AUTO-ENTMCNC: 35.3 G/DL (ref 31.5–36.5)
MCV RBC AUTO: 87 FL (ref 78–100)
MONOCYTES # BLD AUTO: 0.6 10E3/UL (ref 0–1.3)
MONOCYTES NFR BLD AUTO: 9 %
NEUTROPHILS # BLD AUTO: 2.8 10E3/UL (ref 1.6–8.3)
NEUTROPHILS NFR BLD AUTO: 43 %
NRBC # BLD AUTO: 0 10E3/UL
NRBC BLD AUTO-RTO: 0 /100
PLATELET # BLD AUTO: 56 10E3/UL (ref 150–450)
POTASSIUM BLD-SCNC: 4.7 MMOL/L (ref 3.4–5.3)
PROT SERPL-MCNC: 6 G/DL (ref 6.8–8.8)
RBC # BLD AUTO: 5 10E6/UL (ref 4.4–5.9)
SODIUM SERPL-SCNC: 136 MMOL/L (ref 133–144)
TACROLIMUS BLD-MCNC: 2.1 UG/L (ref 5–15)
TME LAST DOSE: ABNORMAL H
TME LAST DOSE: ABNORMAL H
WBC # BLD AUTO: 6.6 10E3/UL (ref 4–11)

## 2022-04-15 PROCEDURE — G0463 HOSPITAL OUTPT CLINIC VISIT: HCPCS

## 2022-04-15 PROCEDURE — 250N000011 HC RX IP 250 OP 636: Performed by: PHYSICIAN ASSISTANT

## 2022-04-15 PROCEDURE — 84155 ASSAY OF PROTEIN SERUM: CPT

## 2022-04-15 PROCEDURE — 80197 ASSAY OF TACROLIMUS: CPT

## 2022-04-15 PROCEDURE — 85025 COMPLETE CBC W/AUTO DIFF WBC: CPT

## 2022-04-15 PROCEDURE — 99215 OFFICE O/P EST HI 40 MIN: CPT

## 2022-04-15 PROCEDURE — 36591 DRAW BLOOD OFF VENOUS DEVICE: CPT

## 2022-04-15 RX ORDER — AZITHROMYCIN 250 MG/1
250 TABLET, FILM COATED ORAL DAILY
Qty: 30 TABLET | Refills: 1 | Status: SHIPPED | OUTPATIENT
Start: 2022-04-15 | End: 2022-05-31

## 2022-04-15 RX ORDER — HEPARIN SODIUM (PORCINE) LOCK FLUSH IV SOLN 100 UNIT/ML 100 UNIT/ML
5 SOLUTION INTRAVENOUS ONCE
Status: COMPLETED | OUTPATIENT
Start: 2022-04-15 | End: 2022-04-15

## 2022-04-15 RX ORDER — VALGANCICLOVIR 450 MG/1
TABLET, FILM COATED ORAL
Qty: 120 TABLET | Refills: 0 | Status: SHIPPED | OUTPATIENT
Start: 2022-04-15 | End: 2022-07-26

## 2022-04-15 RX ORDER — LORAZEPAM 1 MG/1
1 TABLET ORAL
Qty: 30 TABLET | Refills: 3 | Status: SHIPPED | OUTPATIENT
Start: 2022-04-15 | End: 2022-04-20

## 2022-04-15 RX ADMIN — Medication 5 ML: at 08:28

## 2022-04-15 NOTE — NURSING NOTE
Chief Complaint   Patient presents with     Port Draw     Labs drawn via port by RN in lab. VS taken.      Labs drawn from already accessed port. Port flushed with saline and locked with heparin. Pt tolerated well. Vital signs taken and patient checked in for next appointment. Tacrolimus level drawn per pt request and clinic notified.     Tamie Pruett RN

## 2022-04-15 NOTE — NURSING NOTE
"Oncology Rooming Note    April 15, 2022 8:40 AM   Nik Nava is a 61 year old male who presents for:    Chief Complaint   Patient presents with     Port Draw     Labs drawn via port by RN in lab. VS taken.      Oncology Clinic Visit     Hx ALL here for provider visit     Initial Vitals: /82 (BP Location: Right arm, Patient Position: Sitting, Cuff Size: Adult Regular)   Pulse 86   Temp 98.2  F (36.8  C) (Oral)   Resp 18   Wt 100.9 kg (222 lb 8 oz)   SpO2 96%   BMI 29.36 kg/m   Estimated body mass index is 29.36 kg/m  as calculated from the following:    Height as of 3/25/22: 1.854 m (6' 0.99\").    Weight as of this encounter: 100.9 kg (222 lb 8 oz). Body surface area is 2.28 meters squared.  Data Unavailable Comment: Data Unavailable   No LMP for male patient.  Allergies reviewed: Yes  Medications reviewed: Yes    Medications: MEDICATION REFILLS NEEDED TODAY. Provider was notified.  Pharmacy name entered into [employer]:    [facility] - [place], [place] - [address] [facility] [place], [place] - [address]    Clinical concerns: Patient would like lorazepam refilled to [facility] pharmacy       Rima Simon RN              "

## 2022-04-15 NOTE — LETTER
4/15/2022         RE: Nik Nava  [address] [facility] [address]        Dear Colleague,    Thank you for referring your patient, Nik Nava, to the [facility] [place]. Please see a copy of my visit note below.      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  Rash gone. Not using creams  Mouth without open lesions and overall has been okay. Using dex swish  Just saw optho and eyes looked good f/u 1yr- still bothersome to him. No skin changes.   - Wife has Shingles on scalp- discussion about risk to him and hygeine practice and that no longer infectious once scabs from  - had ear infection a couple of month ago, infection cleared but has been unsteady-- unsure if vertigo issue or if more balance struggles due to steroid induced myopathy. Dr Grimaldo had referred to ENT however first available 7/21. Okay for him to see a community ENT?   - Has fallen a couple of times- Would like external referral for PT that is nearer to his home than coming here. Also feeling weaker from steroids.   - 4/12 Pred decreased 75mg/25mg alternating --- no change in GVHD symptoms. Sleep improved on decreased steroid dose.   - new left ankle/achilles pain with walking. Doesn't know if twisted ankle during his fall? Has been on levaquin since starting steroids.       Review of Systems                                                                                                                                         12 point Review of systems was o/w negative     PHYSICAL EXAM                                                                                                                                                 KPS: 80  /82 (BP Location: Right arm, Patient Position: Sitting, Cuff Size: Adult Regular)    Pulse 86   Temp 98.2  F (36.8  C) (Oral)   Resp 18   Wt 100.9 kg (222 lb 8 oz)   SpO2 96%   BMI 29.36 kg/m      Wt Readings from Last 4 Encounters:   04/15/22 100.9 kg (222 lb 8 oz)   04/12/22 99.7 kg (219 lb 14.4 oz)   04/06/22 100.3 kg (221 lb 1.9 oz)   03/31/22 98 kg (216 lb)      General: NAD.  HEENT: sclera anicteric.  No lichenoid changes or oral ulcers noted on buccal mucosa, inner lips or palate; minimal erythema posterior buccal adjacent to uppre molars.  Lungs:  CTA b/l   no wheezes  CV: RRR  no gallop  Abd; soft no tenderness; BS nl   Ext/ Skin: Hyperpigmentation noted on torso, for patient and wife resolving and fading, no residual rash  LE 1+ edema has on stockings.  Full ROM UE and LE; full ROM wrists, fingers, ankles  Neuro;  All CN intact; palate and face symmetric.    cGVHD therapy started on February 24 2022  - Baseline NIH scoring 2/24/2022 : skin 2 maculopapular rash/erythema with no sclerotic features, mouth score 1 mild symptoms with lichenoid changes less than 25%, eyes score 2 moderate symptoms without new visual impairments due to KCS requiring lubricant eyedrops more than 3 times a day, overall mild scale for her provider  - 3/25/2022 1 month NIH treatment assessment on PQRST: skin 2, mouth score 1 mild symptoms with NO lichenoid changes, eyes score 2 moderate symptoms w requiring lubricant eyedrops more than 3 times a day, liver score 1 ALT 3.7x ULN and nl bilirubin   - 3/31  Mouth clear;  eyes minimal LFT still abn; no joint or new GI sx  - 4/6 Mouth clear, optho said eyes okay, LFT's slow improvement today, no new joint, skin, or GI symptoms.     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 S/p allo sib PBSCT. (ABO  matched)  HCT-CI score: 3 (prior solid tumor)  Day +100 bone marrow biopsy is 100% donor with no morphologic or flow cytometric evidence of leukemia BCR abl is pending.  - Day +180 restaging BM bx- still in CR  100% donor;  2/16 BCR ABL major breakpoint undetectable.     2.  HEME: counts are downtrending-- pred taper?   - Transfuse for hgb <7g/dL; plt < 10k.  No transfusions needs  - Pulmonary emboli: He developed a pulmonary emboli in the setting of receiving PEG asparaginase (ie provoked thrombus).  Xarelto complete.     3.  FEN/Renal:  - Cr stable  - He has a history of renal cancer and resection. Has a single kidney.    4.    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   #Skin GVHD- history of biopsy proven GVHD of the skin 8/30/2021; resolved.   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 May require additional therapy.   # Ocular GVHD: follows with ophthalmology. Maxitrol to lids, continue refreshing drops QID. Just seen by optho eyes look good. F/U 1yr.  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   - Cont tac to 1mg BID. Level 2.1. AWaiting message back from Dr Avila Tobar regarding steroid taper/switch to siro;   3/1-3/16: prednisone 100mg every day  3/17 75mg every day   3/31 75 alt with 50  4/6: 75 alt with 25mg every other day  4/12 pred tapered to 60 alternating with 25mg   4/15 In setting of viruses trying to reactivate,GVHD stable/not flaring. Taper further today 60/15mg alternating.   - Messaged Dr Avila Tobar about CMV/EBV reactivation to see how rapidly I can taper pred as prior goal of 50/0 alternating but I am concerned this will not be enough to suppress viral reactivation and significant steroid myopathy.     5.  ID: Afebrile  Otitis media 3/1: s/p levaquin.  Sudafed to dry up fluid behind ear.-- Balance issues- External referral to ENT as first available here is 7/2022. If further falls, balance issues  low threshold for Brain MRI.     PPx: ACV, levaquin, micafungin M/W/F. Pentamidine given 3/9. Requested for next week.   EBV viremia: 51k. No adenopathy on exam. 4/20 CAP CT (w/ contrast)-- Encouraged pushing fluid before and liekly give fluids after CT for renal protections.     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 4/15 start valcyte 900mg PO BID. Monitor counts closely.    S/p covid vaccine series 12/2021  S/p evusheld    6. Endo: Hx of graves disease; On synthroid 175 mcg daily. TSH normal today no reflex to T4.     7. CV: Norvasc: 2.5mg.    8. GI:   Omeprazole for heartburn  LFTs as above    9. Psych:   - Situational anxiety - lexapro 10mg daily.   - Insomnia: worse on steroids. Ativan, trazadone, melatonin, discussed dose optimization.     RTC: Wed/Friday  Awaiting discussion with Dr Avila Tobar regarding her recommendation of pred taper, siro/tac and managing steroid myopathy/viremia.     I spent 50 minutes in the care of this patient today, which included time necessary for preparation for the visit, obtaining history, ordering medications/tests/procedures as medically indicated, review of pertinent medical literature, counseling of the patient, communication of recommendations to the care team, and documentation time.    Nuvia Wisdom PA-C  [phone]          Again, thank you for allowing me to participate in the care of your patient.      Sincerely,    BMT Advanced Practice Provider

## 2022-04-15 NOTE — PROGRESS NOTES
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  Rash gone. Not using creams  Mouth without open lesions and overall has been okay. Using dex swish  Just saw optho and eyes looked good f/u 1yr- still bothersome to him. No skin changes.   - Wife has Shingles on scalp- discussion about risk to him and hygeine practice and that no longer infectious once scabs from  - had ear infection a couple of month ago, infection cleared but has been unsteady-- unsure if vertigo issue or if more balance struggles due to steroid induced myopathy. Dr Grimaldo had referred to ENT however first available 7/21. Okay for him to see a community ENT?   - Has fallen a couple of times- Would like external referral for PT that is nearer to his home than coming here. Also feeling weaker from steroids.   - 4/12 Pred decreased 75mg/25mg alternating --- no change in GVHD symptoms. Sleep improved on decreased steroid dose.   - new left ankle/achilles pain with walking. Doesn't know if twisted ankle during his fall? Has been on levaquin since starting steroids.       Review of Systems                                                                                                                                         12 point Review of systems was o/w negative     PHYSICAL EXAM                                                                                                                                                 KPS: 80  /82 (BP Location: Right arm, Patient Position: Sitting, Cuff Size: Adult Regular)   Pulse 86   Temp 98.2  F (36.8  C) (Oral)   Resp 18   Wt 100.9 kg (222 lb 8 oz)   SpO2 96%   BMI 29.36 kg/m      Wt Readings from Last 4 Encounters:   04/15/22 100.9 kg (222 lb 8 oz)   04/12/22 99.7 kg (219 lb 14.4 oz)   04/06/22 100.3 kg (221 lb 1.9 oz)   03/31/22 98 kg (216 lb)       General: NAD.  HEENT: sclera anicteric.  No lichenoid changes or oral ulcers noted on buccal mucosa, inner lips or palate; minimal erythema posterior buccal adjacent to uppre molars.  Lungs:  CTA b/l   no wheezes  CV: RRR  no gallop  Abd; soft no tenderness; BS nl   Ext/ Skin: Hyperpigmentation noted on torso, for patient and wife resolving and fading, no residual rash  LE 1+ edema has on stockings.  Full ROM UE and LE; full ROM wrists, fingers, ankles  Neuro;  All CN intact; palate and face symmetric.    cGVHD therapy started on February 24 2022  - Baseline NIH scoring 2/24/2022 : skin 2 maculopapular rash/erythema with no sclerotic features, mouth score 1 mild symptoms with lichenoid changes less than 25%, eyes score 2 moderate symptoms without new visual impairments due to KCS requiring lubricant eyedrops more than 3 times a day, overall mild scale for her provider  - 3/25/2022 1 month NIH treatment assessment on PQRST: skin 2, mouth score 1 mild symptoms with NO lichenoid changes, eyes score 2 moderate symptoms w requiring lubricant eyedrops more than 3 times a day, liver score 1 ALT 3.7x ULN and nl bilirubin   - 3/31  Mouth clear;  eyes minimal LFT still abn; no joint or new GI sx  - 4/6 Mouth clear, optho said eyes okay, LFT's slow improvement today, no new joint, skin, or GI symptoms.     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 S/p allo sib PBSCT. (ABO matched)  HCT-CI score: 3 (prior solid tumor)  Day +100 bone marrow biopsy is 100% donor with no morphologic or flow cytometric evidence of leukemia BCR abl is pending.  - Day +180 restaging BM bx- still in CR  100% donor;  2/16 BCR ABL major breakpoint undetectable.     2.  HEME: counts are  downtrending-- pred taper?   - Transfuse for hgb <7g/dL; plt < 10k.  No transfusions needs  - Pulmonary emboli: He developed a pulmonary emboli in the setting of receiving PEG asparaginase (ie provoked thrombus).  Xarelto complete.     3.  FEN/Renal:  - Cr stable  - He has a history of renal cancer and resection. Has a single kidney.    4.    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   #Skin GVHD- history of biopsy proven GVHD of the skin 8/30/2021; resolved.   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 May require additional therapy.   # Ocular GVHD: follows with ophthalmology. Maxitrol to lids, continue refreshing drops QID. Just seen by optho eyes look good. F/U 1yr.  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   - Cont tac to 1mg BID. Level 2.1. Discussed with Dr Hill-- Continue tac at his dose. Do not increase dose. But no need switch to sirolimus. (keep tac low as gvhd is quiet and viremia).      3/1-3/16: prednisone 100mg every day  3/17 75mg every day   3/31 75 alt with 50  4/6: 75 alt with 25mg every other day  4/12 pred tapered to 60 alternating with 25mg   4/15 In setting of viruses trying to reactivate,GVHD stable/not flaring. Taper further today 60/15mg alternating.   - Dr Hill-- continue to taper pred every few days as toelrated given viremias. Taper 50/10 4/20 if doing okay, then 50/0, 40/0 (likely slow to weekly taper at that point).       5.  ID: Afebrile  Otitis media 3/1: s/p levaquin.  Sudafed to dry up fluid behind ear.-- Balance issues- External referral to ENT as first available here is 7/2022. If further falls, balance issues low threshold for Brain MRI.     PPx: ACV, levaquin, micafungin M/W/F. Pentamidine given 3/9. Requested for next week.   EBV viremia: 51k. No adenopathy on exam. 4/20 CAP CT (w/ contrast)-- Encouraged pushing fluid before and liekly give fluids after CT for renal  protections.   -Plan fir rituximab 375mg/m2 4/22-- in tx plan but needs signed.     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 4/15 start valcyte 900mg PO BID. Monitor counts closely.    S/p covid vaccine series 12/2021  S/p anuradha    6. Endo: Hx of graves disease; On synthroid 175 mcg daily. TSH normal today no reflex to T4.     7. CV: Norvasc: 2.5mg.    8. GI:   Omeprazole for heartburn  LFTs as above    9. Psych:   - Situational anxiety - lexapro 10mg daily.   - Insomnia: worse on steroids. Ativan, trazadone, melatonin, discussed dose optimization.     RTC: Wed/Friday  Taper pred 60/15 today. If doing okay taper 50/10 Wednesday.   - f/u CMV and counts with start valcyte   Plan for rituximab Friday (after ours-- needs infusion pharmacy to ensure coverage next week)- message sent).     I spent 50 minutes in the care of this patient today, which included time necessary for preparation for the visit, obtaining history, ordering medications/tests/procedures as medically indicated, review of pertinent medical literature, counseling of the patient, communication of recommendations to the care team, and documentation time.    Nuvia Wisdom PA-C  [phone]     Yes

## 2022-04-16 DIAGNOSIS — B27.00 EPSTEIN-BARR VIRUS VIREMIA: Primary | ICD-10-CM

## 2022-04-16 DIAGNOSIS — C91.01 ACUTE LYMPHOBLASTIC LEUKEMIA (ALL) IN REMISSION (H): ICD-10-CM

## 2022-04-16 RX ORDER — MEPERIDINE HYDROCHLORIDE 25 MG/ML
25 INJECTION INTRAMUSCULAR; INTRAVENOUS; SUBCUTANEOUS EVERY 30 MIN PRN
Status: CANCELLED | OUTPATIENT
Start: 2022-05-13

## 2022-04-16 RX ORDER — DIPHENHYDRAMINE HCL 25 MG
50 CAPSULE ORAL ONCE
Status: CANCELLED
Start: 2022-04-29

## 2022-04-16 RX ORDER — EPINEPHRINE 1 MG/ML
0.3 INJECTION, SOLUTION INTRAMUSCULAR; SUBCUTANEOUS EVERY 5 MIN PRN
Status: CANCELLED | OUTPATIENT
Start: 2022-04-29

## 2022-04-16 RX ORDER — NALOXONE HYDROCHLORIDE 0.4 MG/ML
0.2 INJECTION, SOLUTION INTRAMUSCULAR; INTRAVENOUS; SUBCUTANEOUS
Status: CANCELLED | OUTPATIENT
Start: 2022-05-13

## 2022-04-16 RX ORDER — ALBUTEROL SULFATE 0.83 MG/ML
2.5 SOLUTION RESPIRATORY (INHALATION)
Status: CANCELLED | OUTPATIENT
Start: 2022-05-06

## 2022-04-16 RX ORDER — METHYLPREDNISOLONE SODIUM SUCCINATE 125 MG/2ML
125 INJECTION, POWDER, LYOPHILIZED, FOR SOLUTION INTRAMUSCULAR; INTRAVENOUS
Status: CANCELLED
Start: 2022-04-29

## 2022-04-16 RX ORDER — DIPHENHYDRAMINE HYDROCHLORIDE 50 MG/ML
50 INJECTION INTRAMUSCULAR; INTRAVENOUS
Status: CANCELLED
Start: 2022-05-06

## 2022-04-16 RX ORDER — EPINEPHRINE 1 MG/ML
0.3 INJECTION, SOLUTION INTRAMUSCULAR; SUBCUTANEOUS EVERY 5 MIN PRN
Status: CANCELLED | OUTPATIENT
Start: 2022-05-06

## 2022-04-16 RX ORDER — HEPARIN SODIUM,PORCINE 10 UNIT/ML
5 VIAL (ML) INTRAVENOUS
Status: CANCELLED | OUTPATIENT
Start: 2022-05-13

## 2022-04-16 RX ORDER — ALBUTEROL SULFATE 0.83 MG/ML
2.5 SOLUTION RESPIRATORY (INHALATION)
Status: CANCELLED | OUTPATIENT
Start: 2022-05-13

## 2022-04-16 RX ORDER — METHYLPREDNISOLONE SODIUM SUCCINATE 125 MG/2ML
125 INJECTION, POWDER, LYOPHILIZED, FOR SOLUTION INTRAMUSCULAR; INTRAVENOUS
Status: CANCELLED
Start: 2022-04-22

## 2022-04-16 RX ORDER — HEPARIN SODIUM (PORCINE) LOCK FLUSH IV SOLN 100 UNIT/ML 100 UNIT/ML
5 SOLUTION INTRAVENOUS
Status: CANCELLED | OUTPATIENT
Start: 2022-04-22

## 2022-04-16 RX ORDER — NALOXONE HYDROCHLORIDE 0.4 MG/ML
0.2 INJECTION, SOLUTION INTRAMUSCULAR; INTRAVENOUS; SUBCUTANEOUS
Status: CANCELLED | OUTPATIENT
Start: 2022-04-22

## 2022-04-16 RX ORDER — MEPERIDINE HYDROCHLORIDE 25 MG/ML
25 INJECTION INTRAMUSCULAR; INTRAVENOUS; SUBCUTANEOUS EVERY 30 MIN PRN
Status: CANCELLED | OUTPATIENT
Start: 2022-04-22

## 2022-04-16 RX ORDER — NALOXONE HYDROCHLORIDE 0.4 MG/ML
0.2 INJECTION, SOLUTION INTRAMUSCULAR; INTRAVENOUS; SUBCUTANEOUS
Status: CANCELLED | OUTPATIENT
Start: 2022-05-06

## 2022-04-16 RX ORDER — HEPARIN SODIUM,PORCINE 10 UNIT/ML
5 VIAL (ML) INTRAVENOUS
Status: CANCELLED | OUTPATIENT
Start: 2022-05-06

## 2022-04-16 RX ORDER — HEPARIN SODIUM (PORCINE) LOCK FLUSH IV SOLN 100 UNIT/ML 100 UNIT/ML
5 SOLUTION INTRAVENOUS
Status: CANCELLED | OUTPATIENT
Start: 2022-05-13

## 2022-04-16 RX ORDER — MEPERIDINE HYDROCHLORIDE 25 MG/ML
25 INJECTION INTRAMUSCULAR; INTRAVENOUS; SUBCUTANEOUS EVERY 30 MIN PRN
Status: CANCELLED | OUTPATIENT
Start: 2022-05-06

## 2022-04-16 RX ORDER — MEPERIDINE HYDROCHLORIDE 25 MG/ML
25 INJECTION INTRAMUSCULAR; INTRAVENOUS; SUBCUTANEOUS
Status: CANCELLED
Start: 2022-05-06

## 2022-04-16 RX ORDER — ALBUTEROL SULFATE 90 UG/1
1-2 AEROSOL, METERED RESPIRATORY (INHALATION)
Status: CANCELLED
Start: 2022-04-22

## 2022-04-16 RX ORDER — ACETAMINOPHEN 325 MG/1
650 TABLET ORAL ONCE
Status: CANCELLED
Start: 2022-04-29

## 2022-04-16 RX ORDER — METHYLPREDNISOLONE SODIUM SUCCINATE 125 MG/2ML
125 INJECTION, POWDER, LYOPHILIZED, FOR SOLUTION INTRAMUSCULAR; INTRAVENOUS
Status: CANCELLED
Start: 2022-05-13

## 2022-04-16 RX ORDER — DIPHENHYDRAMINE HYDROCHLORIDE 50 MG/ML
50 INJECTION INTRAMUSCULAR; INTRAVENOUS
Status: CANCELLED
Start: 2022-04-22

## 2022-04-16 RX ORDER — ALBUTEROL SULFATE 90 UG/1
1-2 AEROSOL, METERED RESPIRATORY (INHALATION)
Status: CANCELLED
Start: 2022-04-29

## 2022-04-16 RX ORDER — METHYLPREDNISOLONE SODIUM SUCCINATE 125 MG/2ML
125 INJECTION, POWDER, LYOPHILIZED, FOR SOLUTION INTRAMUSCULAR; INTRAVENOUS
Status: CANCELLED
Start: 2022-05-06

## 2022-04-16 RX ORDER — EPINEPHRINE 1 MG/ML
0.3 INJECTION, SOLUTION INTRAMUSCULAR; SUBCUTANEOUS EVERY 5 MIN PRN
Status: CANCELLED | OUTPATIENT
Start: 2022-05-13

## 2022-04-16 RX ORDER — DIPHENHYDRAMINE HCL 25 MG
50 CAPSULE ORAL ONCE
Status: CANCELLED
Start: 2022-05-06

## 2022-04-16 RX ORDER — HEPARIN SODIUM (PORCINE) LOCK FLUSH IV SOLN 100 UNIT/ML 100 UNIT/ML
5 SOLUTION INTRAVENOUS
Status: CANCELLED | OUTPATIENT
Start: 2022-05-06

## 2022-04-16 RX ORDER — DIPHENHYDRAMINE HYDROCHLORIDE 50 MG/ML
50 INJECTION INTRAMUSCULAR; INTRAVENOUS
Status: CANCELLED
Start: 2022-05-13

## 2022-04-16 RX ORDER — NALOXONE HYDROCHLORIDE 0.4 MG/ML
0.2 INJECTION, SOLUTION INTRAMUSCULAR; INTRAVENOUS; SUBCUTANEOUS
Status: CANCELLED | OUTPATIENT
Start: 2022-04-29

## 2022-04-16 RX ORDER — ACETAMINOPHEN 325 MG/1
650 TABLET ORAL ONCE
Status: CANCELLED
Start: 2022-04-22

## 2022-04-16 RX ORDER — MEPERIDINE HYDROCHLORIDE 25 MG/ML
25 INJECTION INTRAMUSCULAR; INTRAVENOUS; SUBCUTANEOUS
Status: CANCELLED
Start: 2022-05-13

## 2022-04-16 RX ORDER — DIPHENHYDRAMINE HYDROCHLORIDE 50 MG/ML
50 INJECTION INTRAMUSCULAR; INTRAVENOUS
Status: CANCELLED
Start: 2022-04-29

## 2022-04-16 RX ORDER — DIPHENHYDRAMINE HCL 25 MG
50 CAPSULE ORAL ONCE
Status: CANCELLED
Start: 2022-05-13

## 2022-04-16 RX ORDER — MEPERIDINE HYDROCHLORIDE 25 MG/ML
25 INJECTION INTRAMUSCULAR; INTRAVENOUS; SUBCUTANEOUS
Status: CANCELLED
Start: 2022-04-22

## 2022-04-16 RX ORDER — DIPHENHYDRAMINE HCL 25 MG
50 CAPSULE ORAL ONCE
Status: CANCELLED
Start: 2022-04-22

## 2022-04-16 RX ORDER — HEPARIN SODIUM,PORCINE 10 UNIT/ML
5 VIAL (ML) INTRAVENOUS
Status: CANCELLED | OUTPATIENT
Start: 2022-04-22

## 2022-04-16 RX ORDER — HEPARIN SODIUM,PORCINE 10 UNIT/ML
5 VIAL (ML) INTRAVENOUS
Status: CANCELLED | OUTPATIENT
Start: 2022-04-29

## 2022-04-16 RX ORDER — ALBUTEROL SULFATE 90 UG/1
1-2 AEROSOL, METERED RESPIRATORY (INHALATION)
Status: CANCELLED
Start: 2022-05-13

## 2022-04-16 RX ORDER — ALBUTEROL SULFATE 0.83 MG/ML
2.5 SOLUTION RESPIRATORY (INHALATION)
Status: CANCELLED | OUTPATIENT
Start: 2022-04-29

## 2022-04-16 RX ORDER — ACETAMINOPHEN 325 MG/1
650 TABLET ORAL ONCE
Status: CANCELLED
Start: 2022-05-13

## 2022-04-16 RX ORDER — ALBUTEROL SULFATE 90 UG/1
1-2 AEROSOL, METERED RESPIRATORY (INHALATION)
Status: CANCELLED
Start: 2022-05-06

## 2022-04-16 RX ORDER — HEPARIN SODIUM (PORCINE) LOCK FLUSH IV SOLN 100 UNIT/ML 100 UNIT/ML
5 SOLUTION INTRAVENOUS
Status: CANCELLED | OUTPATIENT
Start: 2022-04-29

## 2022-04-16 RX ORDER — MEPERIDINE HYDROCHLORIDE 25 MG/ML
25 INJECTION INTRAMUSCULAR; INTRAVENOUS; SUBCUTANEOUS
Status: CANCELLED
Start: 2022-04-29

## 2022-04-16 RX ORDER — EPINEPHRINE 1 MG/ML
0.3 INJECTION, SOLUTION INTRAMUSCULAR; SUBCUTANEOUS EVERY 5 MIN PRN
Status: CANCELLED | OUTPATIENT
Start: 2022-04-22

## 2022-04-16 RX ORDER — ACETAMINOPHEN 325 MG/1
650 TABLET ORAL ONCE
Status: CANCELLED
Start: 2022-05-06

## 2022-04-16 RX ORDER — MEPERIDINE HYDROCHLORIDE 25 MG/ML
25 INJECTION INTRAMUSCULAR; INTRAVENOUS; SUBCUTANEOUS EVERY 30 MIN PRN
Status: CANCELLED | OUTPATIENT
Start: 2022-04-29

## 2022-04-16 RX ORDER — ALBUTEROL SULFATE 0.83 MG/ML
2.5 SOLUTION RESPIRATORY (INHALATION)
Status: CANCELLED | OUTPATIENT
Start: 2022-04-22

## 2022-04-20 ENCOUNTER — ONCOLOGY VISIT (OUTPATIENT)
Dept: TRANSPLANT | Facility: CLINIC | Age: 62
End: 2022-04-20
Attending: PHYSICIAN ASSISTANT
Payer: COMMERCIAL

## 2022-04-20 ENCOUNTER — APPOINTMENT (OUTPATIENT)
Dept: LAB | Facility: CLINIC | Age: 62
End: 2022-04-20
Attending: PHYSICIAN ASSISTANT
Payer: COMMERCIAL

## 2022-04-20 ENCOUNTER — ANCILLARY PROCEDURE (OUTPATIENT)
Dept: CT IMAGING | Facility: CLINIC | Age: 62
End: 2022-04-20
Attending: PHYSICIAN ASSISTANT
Payer: COMMERCIAL

## 2022-04-20 VITALS
BODY MASS INDEX: 29.69 KG/M2 | RESPIRATION RATE: 18 BRPM | OXYGEN SATURATION: 97 % | DIASTOLIC BLOOD PRESSURE: 83 MMHG | SYSTOLIC BLOOD PRESSURE: 128 MMHG | HEART RATE: 82 BPM | WEIGHT: 225 LBS | TEMPERATURE: 97.1 F

## 2022-04-20 DIAGNOSIS — D89.813 GVHD AS COMPLICATION OF BONE MARROW TRANSPLANT (H): Primary | ICD-10-CM

## 2022-04-20 DIAGNOSIS — T86.09 GVHD AS COMPLICATION OF BONE MARROW TRANSPLANT (H): Primary | ICD-10-CM

## 2022-04-20 DIAGNOSIS — C91.01 ACUTE LYMPHOBLASTIC LEUKEMIA (ALL) IN REMISSION (H): ICD-10-CM

## 2022-04-20 DIAGNOSIS — D89.813 GVHD AS COMPLICATION OF BONE MARROW TRANSPLANT (H): ICD-10-CM

## 2022-04-20 DIAGNOSIS — T86.09 OTHER COMPLICATION OF BONE MARROW TRANSPLANT (H): ICD-10-CM

## 2022-04-20 DIAGNOSIS — Z94.81 STATUS POST BONE MARROW TRANSPLANT (H): ICD-10-CM

## 2022-04-20 DIAGNOSIS — T86.09 GVHD AS COMPLICATION OF BONE MARROW TRANSPLANT (H): ICD-10-CM

## 2022-04-20 LAB
ALBUMIN SERPL-MCNC: 2.3 G/DL (ref 3.4–5)
ALP SERPL-CCNC: 219 U/L (ref 40–150)
ALT SERPL W P-5'-P-CCNC: 100 U/L (ref 0–70)
ANION GAP SERPL CALCULATED.3IONS-SCNC: 5 MMOL/L (ref 3–14)
AST SERPL W P-5'-P-CCNC: ABNORMAL U/L
BASOPHILS # BLD AUTO: 0 10E3/UL (ref 0–0.2)
BASOPHILS NFR BLD AUTO: 0 %
BILIRUB SERPL-MCNC: 0.6 MG/DL (ref 0.2–1.3)
BUN SERPL-MCNC: 26 MG/DL (ref 7–30)
CALCIUM SERPL-MCNC: 8.5 MG/DL (ref 8.5–10.1)
CHLORIDE BLD-SCNC: 101 MMOL/L (ref 94–109)
CMV DNA SPEC NAA+PROBE-ACNC: <137 IU/ML
CMV DNA SPEC NAA+PROBE-LOG#: <2.1 {LOG_COPIES}/ML
CO2 SERPL-SCNC: 27 MMOL/L (ref 20–32)
CREAT SERPL-MCNC: 0.86 MG/DL (ref 0.66–1.25)
EOSINOPHIL # BLD AUTO: 0 10E3/UL (ref 0–0.7)
EOSINOPHIL NFR BLD AUTO: 0 %
ERYTHROCYTE [DISTWIDTH] IN BLOOD BY AUTOMATED COUNT: 20.1 % (ref 10–15)
GFR SERPL CREATININE-BSD FRML MDRD: >90 ML/MIN/1.73M2
GLUCOSE BLD-MCNC: 107 MG/DL (ref 70–99)
HCT VFR BLD AUTO: 41 % (ref 40–53)
HGB BLD-MCNC: 14.1 G/DL (ref 13.3–17.7)
IMM GRANULOCYTES # BLD: 0.1 10E3/UL
IMM GRANULOCYTES NFR BLD: 1 %
LYMPHOCYTES # BLD AUTO: 2.7 10E3/UL (ref 0.8–5.3)
LYMPHOCYTES NFR BLD AUTO: 47 %
MAGNESIUM SERPL-MCNC: 1.6 MG/DL (ref 1.6–2.3)
MCH RBC QN AUTO: 30.4 PG (ref 26.5–33)
MCHC RBC AUTO-ENTMCNC: 34.4 G/DL (ref 31.5–36.5)
MCV RBC AUTO: 88 FL (ref 78–100)
MONOCYTES # BLD AUTO: 0.4 10E3/UL (ref 0–1.3)
MONOCYTES NFR BLD AUTO: 6 %
NEUTROPHILS # BLD AUTO: 2.7 10E3/UL (ref 1.6–8.3)
NEUTROPHILS NFR BLD AUTO: 46 %
NRBC # BLD AUTO: 0 10E3/UL
NRBC BLD AUTO-RTO: 0 /100
PLATELET # BLD AUTO: 54 10E3/UL (ref 150–450)
POTASSIUM BLD-SCNC: 4.1 MMOL/L (ref 3.4–5.3)
PROT SERPL-MCNC: 6 G/DL (ref 6.8–8.8)
RADIOLOGIST FLAGS: ABNORMAL
RBC # BLD AUTO: 4.64 10E6/UL (ref 4.4–5.9)
SODIUM SERPL-SCNC: 133 MMOL/L (ref 133–144)
WBC # BLD AUTO: 5.9 10E3/UL (ref 4–11)

## 2022-04-20 PROCEDURE — 84155 ASSAY OF PROTEIN SERUM: CPT

## 2022-04-20 PROCEDURE — 36591 DRAW BLOOD OFF VENOUS DEVICE: CPT

## 2022-04-20 PROCEDURE — 71260 CT THORAX DX C+: CPT | Performed by: RADIOLOGY

## 2022-04-20 PROCEDURE — 83735 ASSAY OF MAGNESIUM: CPT

## 2022-04-20 PROCEDURE — 99215 OFFICE O/P EST HI 40 MIN: CPT

## 2022-04-20 PROCEDURE — 74177 CT ABD & PELVIS W/CONTRAST: CPT | Performed by: RADIOLOGY

## 2022-04-20 PROCEDURE — 85025 COMPLETE CBC W/AUTO DIFF WBC: CPT

## 2022-04-20 PROCEDURE — 250N000011 HC RX IP 250 OP 636: Performed by: PHYSICIAN ASSISTANT

## 2022-04-20 PROCEDURE — 87799 DETECT AGENT NOS DNA QUANT: CPT

## 2022-04-20 PROCEDURE — G0463 HOSPITAL OUTPT CLINIC VISIT: HCPCS

## 2022-04-20 RX ORDER — LORAZEPAM 1 MG/1
1 TABLET ORAL
Qty: 30 TABLET | Refills: 3 | Status: SHIPPED | OUTPATIENT
Start: 2022-04-20 | End: 2022-09-06

## 2022-04-20 RX ORDER — HEPARIN SODIUM (PORCINE) LOCK FLUSH IV SOLN 100 UNIT/ML 100 UNIT/ML
5 SOLUTION INTRAVENOUS ONCE
Status: COMPLETED | OUTPATIENT
Start: 2022-04-20 | End: 2022-04-20

## 2022-04-20 RX ORDER — TACROLIMUS 1 MG/1
1 CAPSULE ORAL 2 TIMES DAILY
Qty: 120 CAPSULE | Refills: 1 | Status: SHIPPED | OUTPATIENT
Start: 2022-04-20 | End: 2022-04-21

## 2022-04-20 RX ORDER — PREDNISONE 10 MG/1
TABLET ORAL
Qty: 60 TABLET | Refills: 0 | Status: SHIPPED | OUTPATIENT
Start: 2022-04-20 | End: 2022-08-18

## 2022-04-20 RX ORDER — IOPAMIDOL 755 MG/ML
135 INJECTION, SOLUTION INTRAVASCULAR ONCE
Status: COMPLETED | OUTPATIENT
Start: 2022-04-20 | End: 2022-04-20

## 2022-04-20 RX ADMIN — SODIUM CHLORIDE, PRESERVATIVE FREE 5 ML: 5 INJECTION INTRAVENOUS at 08:36

## 2022-04-20 RX ADMIN — IOPAMIDOL 135 ML: 755 INJECTION, SOLUTION INTRAVASCULAR at 07:48

## 2022-04-20 ASSESSMENT — PAIN SCALES - GENERAL: PAINLEVEL: NO PAIN (0)

## 2022-04-20 NOTE — LETTER
4/20/2022         RE: Nik Nava  [address] [facility] [address]        Dear Colleague,    Thank you for referring your patient, Nik Nava, to the [facility]LOOD [facility]ARR[facility] TRANSPLANT PRO[facility] [place]. Please see a copy of my visit note below.      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 No worsening in mouth(nosores, dry mouth and eyes which is stable. At times his back looks rashy (more pink than healing brown color). Left achilles discomfort is improved. However still some weakness in dorsiflexion on left. Balance seems improving as we decrease prednisone. Yet to start PT-- wondering if we should delay due to potential infectious risk with starting valcyte.     Review of Systems                                                                                                                                         12 point Review of systems was o/w negative     PHYSICAL EXAM                                                                                                                                                 KPS: 80  /83   Pulse 82   Temp 97.1  F (36.2  C) (Oral)   Resp 18   Wt 102.1 kg (225 lb)   SpO2 97%   BMI 29.69 kg/m      Wt Readings from Last 4 Encounters:   04/20/22 102.1 kg (225 lb)   04/15/22 100.9 kg (222 lb 8 oz)   04/12/22 99.7 kg (219 lb 14.4 oz)   04/06/22 100.3 kg (221 lb 1.9 oz)      General: NAD.  HEENT: sclera anicteric.  No lichenoid changes or oral ulcers noted on buccal mucosa, inner lips or palate; minimal erythema posterior buccal adjacent to uppre molars.  Lungs:  CTA b/l   no wheezes  CV: RRR  no gallop  Abd; soft no tenderness; BS nl   Ext/ Skin: Hyperpigmentation noted on torso. No areas concerning  for acute rash.   LE 1+ edema has on stockings.  Full ROM UE and LE; full ROM wrists, fingers, ankles  Neuro;  All CN intact; palate and face symmetric.    cGVHD therapy started on February 24 2022  - Baseline NIH scoring 2/24/2022 : skin 2 maculopapular rash/erythema with no sclerotic features, mouth score 1 mild symptoms with lichenoid changes less than 25%, eyes score 2 moderate symptoms without new visual impairments due to KCS requiring lubricant eyedrops more than 3 times a day, overall mild scale for her provider  - 3/25/2022 1 month NIH treatment assessment on PQRST: skin 2, mouth score 1 mild symptoms with NO lichenoid changes, eyes score 2 moderate symptoms w requiring lubricant eyedrops more than 3 times a day, liver score 1 ALT 3.7x ULN and nl bilirubin   - 3/31  Mouth clear;  eyes minimal LFT still abn; no joint or new GI sx  - 4/6 Mouth clear, optho said eyes okay, LFT's slow improvement today, no new joint, skin, or GI symptoms.     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 S/p allo sib PBSCT. (ABO matched)  HCT-CI score: 3 (prior solid tumor)  Day +100 bone marrow biopsy is 100% donor with no morphologic or flow cytometric evidence of leukemia BCR abl is pending.  - Day +180 restaging BM bx- still in CR  100% donor;  2/16 BCR ABL major breakpoint undetectable.     2.  HEME: Counts are stable currently with pred taper and starting valcyte.   - Transfuse for hgb <7g/dL; plt < 10k.  No transfusions needs  - Pulmonary emboli: He developed a pulmonary emboli in the setting of receiving PEG asparaginase (ie provoked thrombus).  Xarelto complete.   - Counts stable on valcyte to date!     3.  FEN/Renal:  - Cr stable  - He has a history of  renal cancer and resection. Has a single kidney.    4.    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   #Skin GVHD- history of biopsy proven GVHD of the skin 8/30/2021; resolved.   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 May require additional therapy.   # Ocular GVHD: follows with ophthalmology. Maxitrol to lids, continue refreshing drops QID. Just seen by optho eyes look good. F/U 1yr.  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   - Cont tac to 1mg BID. Level 2.1. Discussed with Dr Hill-- Continue tac at his dose. Do not increase dose. But no need switch to sirolimus. (keep tac low as gvhd is quiet and viremia).      3/1-3/16: prednisone 100mg every day  3/17 75mg every day   3/31 75 alt with 50  4/6: 75 alt with 25mg every other day  4/12 pred tapered to 60 alternating with 25mg   4/15 In setting of viruses trying to reactivate,GVHD stable: Taper 60/15mg alternating.  4/20 decrease pred further to 50/10.    -Per discussion with Dr Hill-- continue to taper pred every few days as toelrated given viremias. Taper  50/10 4/20 if doing okay, then 50/0, 40/0 (likely slow to weekly taper at that point).       5.  ID: Afebrile   Otitis media 3/1: s/p levaquin.  Sudafed to dry up fluid behind ear.-- Balance issues- seem to be improving as steroids taper-- monitor.      PPx: ACV, micafungin M/W/F. Pentamidine given 3/9.   4/16 Give azithro 250mg daily- continue (stopped levaquin due to possible tendonitis).     EBV viremia: 51k. No adenopathy on exam. 4/20 CAP CT (w/ contrast): No adenopathy.   -Plan for rituximab 375mg/m2 4/22/22 x1. Repeat EBV pending today. If <50k do not give rituximab and follow further.   - 4/20 CT chest with new RUL infiltrate. Highly immunocompromised. Fungitell/asp GM ordered for 4/22. Requested Pulm visit for possible BAL with Dr Garcia. Continue IV micafungin for now.     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  4/15 start valcyte 900mg PO BID.   S/p covid vaccine series 12/2021  S/p anuradha    6. Endo: Hx of graves disease; On synthroid 175 mcg daily. TSH normal today no reflex to T4.     7. CV: Norvasc: 2.5mg.    8. GI:   Omeprazole for heartburn  LFTs as above    9. Psych:   - Situational anxiety - lexapro 10mg daily.   - Insomnia: worse on steroids. Ativan, trazadone, melatonin, discussed dose optimization.     RTC: Wed/Friday  Taper pred 50/10 today.  - Messaged Dr Toussaint/Dr Hill about possible need for infectious work up of RUL nodule. Asp GM and Fungitell ordered for 4/22.   - EBV pending from today- if EBV quant <50k hold rituximab and monitor.   - f/u CMV and counts closely with valcyte   Plan for rituximab Friday (approved 4/20).     I spent 50 minutes in the care of this patient today, which included time necessary for preparation for the visit, obtaining history, ordering medications/tests/procedures as medically indicated, review of pertinent medical literature, counseling of the patient, communication of recommendations to the care team, and documentation time.    Nuvia Wisdom PA-C  [phone]        Again, thank you for allowing me to participate in the care of your patient.      Sincerely,    BMT Advanced Practice Provider

## 2022-04-20 NOTE — PROGRESS NOTES
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 No worsening in mouth(nosores, dry mouth and eyes which is stable. At times his back looks rashy (more pink than healing brown color). Left achilles discomfort is improved. However still some weakness in dorsiflexion on left. Balance seems improving as we decrease prednisone. Yet to start PT-- wondering if we should delay due to potential infectious risk with starting valcyte.     Review of Systems                                                                                                                                         12 point Review of systems was o/w negative     PHYSICAL EXAM                                                                                                                                                 KPS: 80  /83   Pulse 82   Temp 97.1  F (36.2  C) (Oral)   Resp 18   Wt 102.1 kg (225 lb)   SpO2 97%   BMI 29.69 kg/m      Wt Readings from Last 4 Encounters:   04/20/22 102.1 kg (225 lb)   04/15/22 100.9 kg (222 lb 8 oz)   04/12/22 99.7 kg (219 lb 14.4 oz)   04/06/22 100.3 kg (221 lb 1.9 oz)      General: NAD.  HEENT: sclera anicteric.  No lichenoid changes or oral ulcers noted on buccal mucosa, inner lips or palate; minimal erythema posterior buccal adjacent to uppre molars.  Lungs:  CTA b/l   no wheezes  CV: RRR  no gallop  Abd; soft no tenderness; BS nl   Ext/ Skin: Hyperpigmentation noted on torso. No areas concerning for acute rash.   LE 1+ edema has on stockings.  Full ROM UE and LE; full ROM wrists, fingers, ankles  Neuro;  All CN intact; palate and face symmetric.    cGVHD therapy started on February 24 2022  - Baseline NIH scoring 2/24/2022 : skin 2 maculopapular rash/erythema with no  sclerotic features, mouth score 1 mild symptoms with lichenoid changes less than 25%, eyes score 2 moderate symptoms without new visual impairments due to KCS requiring lubricant eyedrops more than 3 times a day, overall mild scale for her provider  - 3/25/2022 1 month [facility] treatment assessment on PQRST: skin 2, mouth score 1 mild symptoms with NO lichenoid changes, eyes score 2 moderate symptoms w requiring lubricant eyedrops more than 3 times a day, liver score 1 ALT 3.7x ULN and nl bilirubin   - 3/31  Mouth clear;  eyes minimal LFT still abn; no joint or new GI sx  - 4/6 Mouth clear, optho said eyes okay, LFT's slow improvement today, no new joint, skin, or GI symptoms.     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  Acute lymphoblastic leukemia, [place] chromosome positive in CR, MRD neg. S/p allo sib PBSCT. (ABO matched)  HCT-CI score: 3 (prior solid tumor)  Day +100 bone marrow biopsy is 100% donor with no morphologic or flow cytometric evidence of leukemia BCR abl is pending.  - Day +180 restaging BM bx- still in CR  100% donor;  2/16 BCR ABL major breakpoint undetectable.     2.  HEME: Counts are stable currently with pred taper and starting valcyte.   - Transfuse for hgb <7g/dL; plt < 10k.  No transfusions needs  - Pulmonary emboli: He developed a pulmonary emboli in the setting of receiving PEG asparaginase (ie provoked thrombus).  Xarelto complete.   - Counts stable on valcyte to date!     3.  FEN/Renal:  - Cr stable  - He has a history of renal cancer and resection. Has a single kidney.    4.    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   #Skin GVHD- history of biopsy proven GVHD of the skin 8/30/2021; resolved.   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 May require additional therapy.   # Ocular GVHD: follows with ophthalmology. Maxitrol to lids, continue refreshing drops QID. Just seen by optho eyes look good. F/U 1yr.  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   - Cont tac to 1mg BID. Level 2.1. Discussed with Dr Hill-- Continue tac at his dose. Do not increase dose. But no need switch to sirolimus. (keep tac low as gvhd is quiet and viremia).      3/1-3/16: prednisone 100mg every day  3/17 75mg every day   3/31 75 alt with 50  4/6: 75 alt with 25mg every other day  4/12 pred tapered to 60 alternating with 25mg   4/15 In setting of viruses trying to reactivate,GVHD stable: Taper 60/15mg alternating.  4/20 decrease pred further to 50/10.    -Per discussion with Dr Hill-- continue to taper pred every few days as toelrated given viremias. Taper  50/10 4/20 if doing okay, then 50/0, 40/0 (likely slow to weekly taper at that point).       5.  ID: Afebrile   Otitis media 3/1: s/p levaquin.  Sudafed to dry up fluid behind ear.-- Balance issues- seem to be improving as steroids taper-- monitor.      PPx: ACV, micafungin M/W/F. Pentamidine given 3/9.   4/16 Give azithro 250mg daily- continue (stopped levaquin due to possible tendonitis).     EBV viremia: 51k. No adenopathy on exam. 4/20 CAP CT (w/ contrast): No adenopathy.   -Plan for rituximab 375mg/m2 4/22/22 x1. Repeat EBV pending today. If <50k do not give rituximab and follow further.   - 4/20 CT chest with new RUL infiltrate. Highly immunocompromised. Fungitell/asp GM ordered for 4/22. Requested Pulm visit for possible BAL with Dr Garcia. Continue IV micafungin for now.     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 4/15 start valcyte 900mg PO BID.   S/p covid vaccine series 12/2021  S/p evusheld    6. Endo: Hx of graves disease; On synthroid 175 mcg daily. TSH normal today no reflex to T4.     7. CV: Norvasc: 2.5mg.    8. GI:   Omeprazole for heartburn  LFTs as above    9. Psych:   -  Situational anxiety - lexapro 10mg daily.   - Insomnia: worse on steroids. Ativan, trazadone, melatonin, discussed dose optimization.     RTC: Wed/Friday  Taper pred 50/10 today.  - Messaged Dr Toussaint/Dr Hill about possible need for infectious work up of RUL nodule. Asp GM and Fungitell ordered for 4/22.   - EBV pending from today- if EBV quant <50k hold rituximab and monitor.   - f/u CMV and counts closely with valcyte   Plan for rituximab Friday (approved 4/20).     I spent 50 minutes in the care of this patient today, which included time necessary for preparation for the visit, obtaining history, ordering medications/tests/procedures as medically indicated, review of pertinent medical literature, counseling of the patient, communication of recommendations to the care team, and documentation time.    Nuvia Wisdom PA-C  [phone]

## 2022-04-20 NOTE — NURSING NOTE
Chief Complaint   Patient presents with     Blood Draw       Port accessed with 20 g 3/4 in flat needle by RN, labs collected, line flushed with saline and heparin.  Vitals taken. Pt checked in for appointment(s).    Sofya Adam RN

## 2022-04-20 NOTE — NURSING NOTE
"Oncology Rooming Note    April 20, 2022 8:43 AM   Nik Nava is a 61 year old male who presents for:    Chief Complaint   Patient presents with     Blood Draw     Oncology Clinic Visit     Rtn Acute lymphoblastic leukemia (ALL) in remission; BMT     Initial Vitals: /83   Pulse 82   Temp 97.1  F (36.2  C) (Oral)   Resp 18   Wt 102.1 kg (225 lb)   SpO2 97%   BMI 29.69 kg/m   Estimated body mass index is 29.69 kg/m  as calculated from the following:    Height as of 3/25/22: 1.854 m (6' 0.99\").    Weight as of this encounter: 102.1 kg (225 lb). Body surface area is 2.29 meters squared.  No Pain (0) Comment: Data Unavailable   No LMP for male patient.  Allergies reviewed: Yes  Medications reviewed: Yes    Medications: Medication refills not needed today. tacrolimus instructions   Pharmacy name entered into [facility]:    [facility] - [place], [place] - [address] [facility] [place], [place] - [phone]    Clinical concerns: Pt has no concerns for their provider on April 20, 2022 and would like to discuss the original chief complaint of the appointment.     Pt notes the numbness, bruising,  and tingling in his Left foot    CRYSTAL Nichols on April 20, 2022 at 8:45 AM          "

## 2022-04-21 ENCOUNTER — TELEPHONE (OUTPATIENT)
Dept: PULMONOLOGY | Facility: CLINIC | Age: 62
End: 2022-04-21
Payer: COMMERCIAL

## 2022-04-21 DIAGNOSIS — C91.01 ACUTE LYMPHOBLASTIC LEUKEMIA (ALL) IN REMISSION (H): ICD-10-CM

## 2022-04-21 LAB
EBV DNA COPIES/ML, INSTRUMENT: ABNORMAL COPIES/ML
EBV DNA SPEC NAA+PROBE-LOG#: 4.7 {LOG_COPIES}/ML

## 2022-04-21 RX ORDER — TACROLIMUS 1 MG/1
1 CAPSULE ORAL 2 TIMES DAILY
Qty: 120 CAPSULE | Refills: 1 | Status: SHIPPED | OUTPATIENT
Start: 2022-04-21 | End: 2022-05-26

## 2022-04-21 NOTE — TELEPHONE ENCOUNTER
Spoke with patient after receiving communication from Dr. Garcia to arrange a NEW appt in [facility] with bronch the following day. Discussed with patient at length and plan is to schedule him for Thursday 4/28 with Dr. Garcia. Informed him bronch service has closed for the day, but will plan on calling first thing in the morning for bronch on 4/29 (preferred time is arriving between 8-9am). Once scheduled, pt will be in clinic for another appt and will plan on stopping by clinic. Informed him once scheduled, he will need covid testing Monday/Tuesday and informed him I would provide contact information once bronch was officially scheduled. Informed him that bronch prep would be completed in clinic on Thursday by RN after provider has seen him. Patient voiced understanding of plan and agreed to stop by pul clinic tomorrow for finalized details.

## 2022-04-22 ENCOUNTER — HOME INFUSION (PRE-WILLOW HOME INFUSION) (OUTPATIENT)
Dept: PHARMACY | Facility: CLINIC | Age: 62
End: 2022-04-22

## 2022-04-22 ENCOUNTER — APPOINTMENT (OUTPATIENT)
Dept: LAB | Facility: CLINIC | Age: 62
End: 2022-04-22
Attending: PHYSICIAN ASSISTANT
Payer: COMMERCIAL

## 2022-04-22 ENCOUNTER — TELEPHONE (OUTPATIENT)
Dept: PULMONOLOGY | Facility: CLINIC | Age: 62
End: 2022-04-22

## 2022-04-22 ENCOUNTER — INFUSION THERAPY VISIT (OUTPATIENT)
Dept: TRANSPLANT | Facility: CLINIC | Age: 62
End: 2022-04-22
Attending: PHYSICIAN ASSISTANT
Payer: COMMERCIAL

## 2022-04-22 VITALS
DIASTOLIC BLOOD PRESSURE: 75 MMHG | SYSTOLIC BLOOD PRESSURE: 123 MMHG | HEART RATE: 85 BPM | BODY MASS INDEX: 29.32 KG/M2 | TEMPERATURE: 97.9 F | WEIGHT: 222.2 LBS | OXYGEN SATURATION: 97 % | RESPIRATION RATE: 16 BRPM

## 2022-04-22 VITALS
RESPIRATION RATE: 14 BRPM | SYSTOLIC BLOOD PRESSURE: 129 MMHG | OXYGEN SATURATION: 96 % | HEART RATE: 73 BPM | DIASTOLIC BLOOD PRESSURE: 73 MMHG | TEMPERATURE: 98.7 F

## 2022-04-22 DIAGNOSIS — C91.01 ACUTE LYMPHOBLASTIC LEUKEMIA (ALL) IN REMISSION (H): Primary | ICD-10-CM

## 2022-04-22 DIAGNOSIS — D89.813 GVHD AS COMPLICATION OF BONE MARROW TRANSPLANT (H): ICD-10-CM

## 2022-04-22 DIAGNOSIS — Z11.59 ENCOUNTER FOR SCREENING FOR OTHER VIRAL DISEASES: Primary | ICD-10-CM

## 2022-04-22 DIAGNOSIS — T86.09 GVHD AS COMPLICATION OF BONE MARROW TRANSPLANT (H): ICD-10-CM

## 2022-04-22 DIAGNOSIS — R93.89 ABNORMAL FINDING ON IMAGING: Primary | ICD-10-CM

## 2022-04-22 DIAGNOSIS — B27.00 EPSTEIN-BARR VIRUS VIREMIA: ICD-10-CM

## 2022-04-22 DIAGNOSIS — Z94.81 STATUS POST BONE MARROW TRANSPLANT (H): ICD-10-CM

## 2022-04-22 LAB
ALBUMIN SERPL-MCNC: 2.5 G/DL (ref 3.4–5)
ALP SERPL-CCNC: 206 U/L (ref 40–150)
ALT SERPL W P-5'-P-CCNC: 102 U/L (ref 0–70)
ANION GAP SERPL CALCULATED.3IONS-SCNC: 5 MMOL/L (ref 3–14)
AST SERPL W P-5'-P-CCNC: 79 U/L (ref 0–45)
BASOPHILS # BLD AUTO: 0 10E3/UL (ref 0–0.2)
BASOPHILS NFR BLD AUTO: 0 %
BILIRUB SERPL-MCNC: 0.7 MG/DL (ref 0.2–1.3)
BUN SERPL-MCNC: 29 MG/DL (ref 7–30)
CALCIUM SERPL-MCNC: 9.5 MG/DL (ref 8.5–10.1)
CHLORIDE BLD-SCNC: 101 MMOL/L (ref 94–109)
CO2 SERPL-SCNC: 29 MMOL/L (ref 20–32)
CREAT SERPL-MCNC: 0.97 MG/DL (ref 0.66–1.25)
EOSINOPHIL # BLD AUTO: 0 10E3/UL (ref 0–0.7)
EOSINOPHIL NFR BLD AUTO: 0 %
ERYTHROCYTE [DISTWIDTH] IN BLOOD BY AUTOMATED COUNT: 20.3 % (ref 10–15)
GFR SERPL CREATININE-BSD FRML MDRD: 88 ML/MIN/1.73M2
GLUCOSE BLD-MCNC: 98 MG/DL (ref 70–99)
HCT VFR BLD AUTO: 42.9 % (ref 40–53)
HGB BLD-MCNC: 15 G/DL (ref 13.3–17.7)
IMM GRANULOCYTES # BLD: 0.1 10E3/UL
IMM GRANULOCYTES NFR BLD: 2 %
LYMPHOCYTES # BLD AUTO: 2.8 10E3/UL (ref 0.8–5.3)
LYMPHOCYTES NFR BLD AUTO: 47 %
MAGNESIUM SERPL-MCNC: 1.6 MG/DL (ref 1.6–2.3)
MCH RBC QN AUTO: 31.2 PG (ref 26.5–33)
MCHC RBC AUTO-ENTMCNC: 35 G/DL (ref 31.5–36.5)
MCV RBC AUTO: 89 FL (ref 78–100)
MONOCYTES # BLD AUTO: 0.2 10E3/UL (ref 0–1.3)
MONOCYTES NFR BLD AUTO: 3 %
NEUTROPHILS # BLD AUTO: 2.9 10E3/UL (ref 1.6–8.3)
NEUTROPHILS NFR BLD AUTO: 48 %
NRBC # BLD AUTO: 0 10E3/UL
NRBC BLD AUTO-RTO: 0 /100
PLAT MORPH BLD: NORMAL
PLATELET # BLD AUTO: 62 10E3/UL (ref 150–450)
POTASSIUM BLD-SCNC: 4.8 MMOL/L (ref 3.4–5.3)
PROT SERPL-MCNC: 6.3 G/DL (ref 6.8–8.8)
RBC # BLD AUTO: 4.81 10E6/UL (ref 4.4–5.9)
RBC MORPH BLD: NORMAL
SODIUM SERPL-SCNC: 135 MMOL/L (ref 133–144)
WBC # BLD AUTO: 6 10E3/UL (ref 4–11)

## 2022-04-22 PROCEDURE — 36591 DRAW BLOOD OFF VENOUS DEVICE: CPT

## 2022-04-22 PROCEDURE — 250N000013 HC RX MED GY IP 250 OP 250 PS 637: Performed by: INTERNAL MEDICINE

## 2022-04-22 PROCEDURE — 99214 OFFICE O/P EST MOD 30 MIN: CPT

## 2022-04-22 PROCEDURE — 83735 ASSAY OF MAGNESIUM: CPT

## 2022-04-22 PROCEDURE — 87305 ASPERGILLUS AG IA: CPT

## 2022-04-22 PROCEDURE — G0463 HOSPITAL OUTPT CLINIC VISIT: HCPCS | Mod: 25

## 2022-04-22 PROCEDURE — 96413 CHEMO IV INFUSION 1 HR: CPT

## 2022-04-22 PROCEDURE — 250N000011 HC RX IP 250 OP 636: Performed by: PHYSICIAN ASSISTANT

## 2022-04-22 PROCEDURE — 82310 ASSAY OF CALCIUM: CPT

## 2022-04-22 PROCEDURE — 96415 CHEMO IV INFUSION ADDL HR: CPT

## 2022-04-22 PROCEDURE — 87799 DETECT AGENT NOS DNA QUANT: CPT

## 2022-04-22 PROCEDURE — 258N000003 HC RX IP 258 OP 636: Performed by: INTERNAL MEDICINE

## 2022-04-22 PROCEDURE — 250N000011 HC RX IP 250 OP 636: Performed by: INTERNAL MEDICINE

## 2022-04-22 PROCEDURE — 82374 ASSAY BLOOD CARBON DIOXIDE: CPT

## 2022-04-22 PROCEDURE — 85025 COMPLETE CBC W/AUTO DIFF WBC: CPT

## 2022-04-22 PROCEDURE — 87449 NOS EACH ORGANISM AG IA: CPT

## 2022-04-22 RX ORDER — TRAZODONE HYDROCHLORIDE 50 MG/1
50 TABLET, FILM COATED ORAL AT BEDTIME
Qty: 30 TABLET | Refills: 3 | Status: SHIPPED | OUTPATIENT
Start: 2022-04-22 | End: 2022-09-06

## 2022-04-22 RX ORDER — DIPHENHYDRAMINE HCL 25 MG
50 CAPSULE ORAL ONCE
Status: COMPLETED | OUTPATIENT
Start: 2022-04-22 | End: 2022-04-22

## 2022-04-22 RX ORDER — HEPARIN SODIUM (PORCINE) LOCK FLUSH IV SOLN 100 UNIT/ML 100 UNIT/ML
500 SOLUTION INTRAVENOUS ONCE
Status: COMPLETED | OUTPATIENT
Start: 2022-04-22 | End: 2022-04-22

## 2022-04-22 RX ORDER — ACETAMINOPHEN 325 MG/1
650 TABLET ORAL ONCE
Status: COMPLETED | OUTPATIENT
Start: 2022-04-22 | End: 2022-04-22

## 2022-04-22 RX ADMIN — ACETAMINOPHEN 650 MG: 325 TABLET ORAL at 09:00

## 2022-04-22 RX ADMIN — RITUXIMAB-ABBS 900 MG: 10 INJECTION, SOLUTION INTRAVENOUS at 09:43

## 2022-04-22 RX ADMIN — DIPHENHYDRAMINE HYDROCHLORIDE 50 MG: 25 CAPSULE ORAL at 09:00

## 2022-04-22 RX ADMIN — Medication 500 UNITS: at 08:49

## 2022-04-22 ASSESSMENT — PAIN SCALES - GENERAL: PAINLEVEL: NO PAIN (0)

## 2022-04-22 NOTE — LETTER
4/22/2022         RE: Nik Nava  [address] [id number]        Dear Colleague,    Thank you for referring your patient, Nik Nava, to the [facility] BLOOD AND MARROW TRANSPLANT PROGRAM [place]. Please see a copy of my visit note below.    Infusion Nursing Note:  Nik Nava presents today for scheduled infusion.    Patient seen by provider today: Yes: Nuvia Wisdom PA-C   present during visit today: Not Applicable.    Note: Patient arrived for scheduled Rituximab infusion. Premedicated with Tylenol 650 mg PO and Benadryl 50 mg PO. Rituximab 900 mg IV as scheduled. VSS throughout.       Intravenous Access:  Implanted Port. Positive BR pre/post infusion.    Treatment Conditions:  Lab Results   Component Value Date    HGB 15.0 04/22/2022    WBC 6.0 04/22/2022    ANEU 3.5 10/26/2021    ANEUTAUTO 2.9 04/22/2022    PLT 62 (L) 04/22/2022          Post Infusion Assessment:  Patient tolerated infusion without incident.       Discharge Plan:   Patient discharged in stable condition accompanied by: wife.  Departure Mode: Ambulatory.      Emiliana Weinstein RN                          Again, thank you for allowing me to participate in the care of your patient.        Sincerely,        [facility]

## 2022-04-22 NOTE — PROGRESS NOTES
Infusion Nursing Note:  Nik Nava presents today for scheduled infusion.    Patient seen by provider today: Yes: Nuvia Wisdom PA-C   present during visit today: Not Applicable.    Note: Patient arrived for scheduled Rituximab infusion. Premedicated with Tylenol 650 mg PO and Benadryl 50 mg PO. Rituximab 900 mg IV as scheduled. VSS throughout.       Intravenous Access:  Implanted Port. Positive BR pre/post infusion.    Treatment Conditions:  Lab Results   Component Value Date    HGB 15.0 04/22/2022    WBC 6.0 04/22/2022    ANEU 3.5 10/26/2021    ANEUTAUTO 2.9 04/22/2022    PLT 62 (L) 04/22/2022          Post Infusion Assessment:  Patient tolerated infusion without incident.       Discharge Plan:   Patient discharged in stable condition accompanied by: wife.  Departure Mode: Ambulatory.      Emiliana Weinstein RN

## 2022-04-22 NOTE — LETTER
4/22/2022         RE: Nik Nava  [address] [address] [id number]        Dear Colleague,    Thank you for referring your patient, Nik Nava, to the [facility] BLOOD AND MARROW TRANSPLANT PROGRAM [place]. Please see a copy of my visit note below.      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  Decreased pred 50/10 and he think he is thinking/sleeping better (less irritable). No worsening in mouth(nosores, dry mouth and eyes which is stable and lfts continue to improve.   - No new issues today. Taking valcyte without issue.   Wife is still concerned about central mild erythema in mid back otherwise rash is fading/brown - stable.  - No respiratory symptoms. Planning on prem Garcia 4/28 and plan for BAL 4/29.   - Covid will be Tuesday 4/26.     Review of Systems                                                                                                                                         12 point Review of systems was o/w negative     PHYSICAL EXAM                                                                                                                                                 KPS: 80  /75   Pulse 85   Temp 97.9  F (36.6  C) (Oral)   Resp 16   Wt 100.8 kg (222 lb 3.2 oz)   SpO2 97%   BMI 29.32 kg/m      Wt Readings from Last 4 Encounters:   04/22/22 100.8 kg (222 lb 3.2 oz)   04/20/22 102.1 kg (225 lb)   04/15/22 100.9 kg (222 lb 8 oz)   04/12/22 99.7 kg (219 lb 14.4 oz)      General: NAD.  HEENT: sclera anicteric.  No lichenoid changes or oral ulcers noted on buccal mucosa, inner lips or palate; minimal erythema posterior buccal adjacent to uppre molars.  Lungs:  CTA b/l   no wheezes  CV: RRR  no gallop  Abd; soft no tenderness; BS nl   Ext/ Skin:  Hyperpigmentation noted on torso. Mild scattered mild erythematous rash in mid back about size of my hand. Overall rash remains to be how/inactive.   LE 1+ edema has on stockings.  Full ROM UE and LE; full ROM wrists, fingers, ankles  Neuro;  All CN intact; palate and face symmetric.    cGVHD therapy started on February 24 2022  - Baseline NIH scoring 2/24/2022 : skin 2 maculopapular rash/erythema with no sclerotic features, mouth score 1 mild symptoms with lichenoid changes less than 25%, eyes score 2 moderate symptoms without new visual impairments due to KCS requiring lubricant eyedrops more than 3 times a day, overall mild scale for her provider  - 3/25/2022 1 month [facility] treatment assessment on PQRST: skin 2, mouth score 1 mild symptoms with NO lichenoid changes, eyes score 2 moderate symptoms w requiring lubricant eyedrops more than 3 times a day, liver score 1 ALT 3.7x ULN and nl bilirubin   - 3/31  Mouth clear;  eyes minimal LFT still abn; no joint or new GI sx  - 4/6 Mouth clear, optho said eyes okay, LFT's slow improvement today, no new joint, skin, or GI symptoms.     ASSESSMENT AND PLAN   Nik Nava is a 61 year old male with Ph Pos ALL, day 255 s/p sib allo stem cell transplant    Day -6 (8/4): flu/cy  Day -5 through day -2 (8/5-8/8): flu  Day -1 (8/9): TBI  Day 0 (8/10): transplant     1.  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 S/p allo sib PBSCT. (ABO matched)  HCT-CI score: 3 (prior solid tumor)  Day +100 bone marrow biopsy is 100% donor with no morphologic or flow cytometric evidence of leukemia BCR abl is pending.  - Day +180 restaging BM bx- still in CR  100% donor;  2/16 BCR ABL major breakpoint undetectable.     2.  HEME: Counts are stable currently with pred taper and starting valcyte.   - Transfuse for hgb <7g/dL; plt < 10k.  No transfusions needs  - Pulmonary emboli: He developed a pulmonary emboli in the setting of receiving PEG asparaginase (ie  provoked thrombus).  Xarelto complete.   - Monitor counts closely.     3.  FEN/Renal:  - Cr stable  - He has a history of renal cancer and resection. Has a single kidney.    4.    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   #Skin GVHD- history of biopsy proven GVHD of the skin 8/30/2021; resolved.   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 May require additional therapy.   # Ocular GVHD: follows with ophthalmology. Maxitrol to lids, continue refreshing drops QID. Just seen by optho eyes look good. F/U 1yr.  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   - Cont tac to 1mg BID. Level 2.1. Discussed with Dr Hill-- Continue tac at his dose and no need for levels currently. Do not increase dose. But no need switch to sirolimus. (keep tac low as gvhd is quiet and viremia).      3/1-3/16: prednisone 100mg every day  3/17 75mg every day   3/31 75 alt with 50  4/6: 75 alt with 25mg every other day  4/12 pred tapered to 60 alternating with 25mg   4/15 In setting of viruses trying to reactivate,GVHD stable: Taper 60/15mg alternating.  4/20 decrease pred further to 50/10.    -4/22 very mild ~10% of back with rash-- recommend TMC cream once daily to mid back. Monitor.   -4/25 plan to taper pred to 50/0 every other day as long as symptoms stable.   -Per discussion with Dr Hill-- continue to taper pred every few days as toelrated given viremias.   - 4/28 if doing okay 40/0 (likely slow to weekly taper at that point).       5.  ID: Afebrile   Otitis media 3/1: s/p levaquin.  Sudafed to dry up fluid behind ear.-- Balance issues- seem to be improving as steroids taper-- monitor.      PPx: ACV, micafungin M/W/F. Pentamidine given 3/9.   4/16 Give azithro 250mg daily- continue (stopped levaquin due to possible tendonitis).     EBV viremia: 51k. No adenopathy on exam. 4/20 CAP CT (w/ contrast): No adenopathy.   -Plan for rituximab  375mg/m2 4/22/22 x1.   - After rituximab x1  will follow EBV viremia as long as remains lower(<50k) would not give additional rituximab doses.   - 4/20 CT chest with new RUL infiltrate. Highly immunocompromised.   -4/22 Fungitell/asp GM pending form today.  See Dr Garcia 4/28, and tentative plan for BAL 4/29  - Defer to Dr Garcia/serum labs before starting Vori/posaconazole. LFTS are nearly normal but hopeful he will be able to tolerate Azole if needed for new lung infection.   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 4/15 start valcyte 900mg PO BID.   S/p covid vaccine series 12/2021  S/p evusheld    6. Endo: Hx of graves disease; On synthroid 175 mcg daily. TSH normal today no reflex to T4.     7. CV: Norvasc: 2.5mg.    8. GI:   Omeprazole for heartburn  LFTs as above    9. Psych:   - Situational anxiety - lexapro 10mg daily.   - Insomnia: worse on steroids. Ativan, trazadone, melatonin, discussed dose optimization.     RTC: Wed/Friday  Taper pred 50/0 Monday-- cotinue 50/10 over the weekend  - GIve Rituximab x1 today. Plan to just follow ebv titer (as long as lower) and no further rituximab unless titer uptrending.   - 4/28 f/u BMT clinic for labs, provider and repeat labs. Possible plts prior to BAL Friday (currently wouldn't need but 2nd week on induction valcyte).   - 4/26 - Should get COVID swab at [facility] (requested this be scheduled) for BAL Friday.   - f/u CMV and counts closely with valcyte    I spent 30 minutes in the care of this patient today, which included time necessary for preparation for the visit, obtaining history, ordering medications/tests/procedures as medically indicated, review of pertinent medical literature, counseling of the patient, communication of recommendations to the care team, and documentation time.    Nuvia Wisdom PA-C  [phone]      Again, thank you for allowing me to participate in the care of your patient.      Sincerely,    BMT Advanced Practice  Provider

## 2022-04-22 NOTE — TELEPHONE ENCOUNTER
Action 4.22.22 sv   Action Taken Image request sent to [facility] for   CXR- 5/8/21, 6.29.20   CT- 4/4/21,   -- received images in PACS-        RECORDS RECEIVED FROM: internal/ce   DATE RECEIVED: 4/28/22    NOTES STATUS DETAILS   OFFICE NOTE from referring provider internal  Alba Wisdom PA-C   OFFICE NOTE from other specialist internal  BMT     DISCHARGE SUMMARY from hospital     DISCHARGE REPORT from the ER     MEDICATION LIST internal     IMAGING  (NEED IMAGES AND REPORTS)     CT SCAN internal /ce Internal 4.20.22, 7/12.21  [facility]- 4/4/21,    CHEST XRAY (CXR) internal /ce Internal 7/8/21   [facility]- 5/8/21, 6.29.20    TESTS     PULMONARY FUNCTION TESTING (PFT) internal  3.1.22, 7/12/21

## 2022-04-22 NOTE — NURSING NOTE
"Oncology Rooming Note    April 22, 2022 8:57 AM   Nik Nava is a 61 year old male who presents for:    Chief Complaint   Patient presents with     Port Draw     Labs drawn via port by RN in lab.  VS taken     Oncology Clinic Visit     Return clinic visit s/p BMT dx ALL     Initial Vitals: /75   Pulse 85   Temp 97.9  F (36.6  C) (Oral)   Resp 16   Wt 100.8 kg (222 lb 3.2 oz)   SpO2 97%   BMI 29.32 kg/m   Estimated body mass index is 29.32 kg/m  as calculated from the following:    Height as of 3/25/22: 1.854 m (6' 0.99\").    Weight as of this encounter: 100.8 kg (222 lb 3.2 oz). Body surface area is 2.28 meters squared.  No Pain (0) Comment: Data Unavailable   No LMP for male patient.  Allergies reviewed: Yes  Medications reviewed: Yes    Medications: MEDICATION REFILLS NEEDED TODAY. Provider was notified.  Pharmacy name entered into [employer]:    [facility] - [place], [place] - [address] [address] [place], [place] - [phone]    Clinical concerns: No new clinical concerns. Denies pain.     Needs trazodone refill.    Emiliana Weinstein RN              "

## 2022-04-22 NOTE — TELEPHONE ENCOUNTER
Informed by Dr. Garcia to arrange a bronch for Friday 4/29. Called patient back after received his preference for the procedure and speaking to bronch service about scheduling. Discussed appt details (4/29 at 10AM with Dr. Garcia, checking in at 9AM). Informed him  is needed as well as COVID testing. Testing number provided in case he is not contact to schedule his test. Message sent to nurse pool regarding prepping patient for procedure.

## 2022-04-22 NOTE — PROGRESS NOTES
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  Decreased pred 50/10 and he think he is thinking/sleeping better (less irritable). No worsening in mouth(nosores, dry mouth and eyes which is stable and lfts continue to improve.   - No new issues today. Taking valcyte without issue.   Wife is still concerned about central mild erythema in mid back otherwise rash is fading/brown - stable.  - No respiratory symptoms. Planning on meetin arturo Garcia 4/28 and plan for BAL 4/29.   - Covid will be Tuesday 4/26.     Review of Systems                                                                                                                                         12 point Review of systems was o/w negative     PHYSICAL EXAM                                                                                                                                                 KPS: 80  /75   Pulse 85   Temp 97.9  F (36.6  C) (Oral)   Resp 16   Wt 100.8 kg (222 lb 3.2 oz)   SpO2 97%   BMI 29.32 kg/m      Wt Readings from Last 4 Encounters:   04/22/22 100.8 kg (222 lb 3.2 oz)   04/20/22 102.1 kg (225 lb)   04/15/22 100.9 kg (222 lb 8 oz)   04/12/22 99.7 kg (219 lb 14.4 oz)      General: NAD.  HEENT: sclera anicteric.  No lichenoid changes or oral ulcers noted on buccal mucosa, inner lips or palate; minimal erythema posterior buccal adjacent to uppre molars.  Lungs:  CTA b/l   no wheezes  CV: RRR  no gallop  Abd; soft no tenderness; BS nl   Ext/ Skin: Hyperpigmentation noted on torso. Mild scattered mild erythematous rash in mid back about size of my hand. Overall rash remains to be how/inactive.   LE 1+ edema has on stockings.  Full ROM UE and LE; full ROM wrists, fingers, ankles  Neuro;  All CN intact; palate and face symmetric.    cGVHD  therapy started on February 24 2022  - Baseline NIH scoring 2/24/2022 : skin 2 maculopapular rash/erythema with no sclerotic features, mouth score 1 mild symptoms with lichenoid changes less than 25%, eyes score 2 moderate symptoms without new visual impairments due to KCS requiring lubricant eyedrops more than 3 times a day, overall mild scale for her provider  - 3/25/2022 1 month NIH treatment assessment on PQRST: skin 2, mouth score 1 mild symptoms with NO lichenoid changes, eyes score 2 moderate symptoms w requiring lubricant eyedrops more than 3 times a day, liver score 1 ALT 3.7x ULN and nl bilirubin   - 3/31  Mouth clear;  eyes minimal LFT still abn; no joint or new GI sx  - 4/6 Mouth clear, optho said eyes okay, LFT's slow improvement today, no new joint, skin, or GI symptoms.     ASSESSMENT AND PLAN   Nik Nava is a 61 year old male with Ph Pos ALL, day 255 s/p sib allo stem cell transplant    Day -6 (8/4): flu/cy  Day -5 through day -2 (8/5-8/8): flu  Day -1 (8/9): TBI  Day 0 (8/10): transplant     1.  Acute lymphoblastic leukemia, [place] chromosome positive in CR, MRD neg. S/p allo sib PBSCT. (ABO matched)  HCT-CI score: 3 (prior solid tumor)  Day +100 bone marrow biopsy is 100% donor with no morphologic or flow cytometric evidence of leukemia BCR abl is pending.  - Day +180 restaging BM bx- still in CR  100% donor;  2/16 BCR ABL major breakpoint undetectable.     2.  HEME: Counts are stable currently with pred taper and starting valcyte.   - Transfuse for hgb <7g/dL; plt < 10k.  No transfusions needs  - Pulmonary emboli: He developed a pulmonary emboli in the setting of receiving PEG asparaginase (ie provoked thrombus).  Xarelto complete.   - Monitor counts closely.     3.  FEN/Renal:  - Cr stable  - He has a history of renal cancer and resection. Has a single kidney.    4.    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   #Skin GVHD- history of biopsy proven GVHD of the  skin 8/30/2021; resolved.   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 May require additional therapy.   # Ocular GVHD: follows with ophthalmology. Maxitrol to lids, continue refreshing drops QID. Just seen by optho eyes look good. F/U 1yr.  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   - Cont tac to 1mg BID. Level 2.1. Discussed with Dr Hill-- Continue tac at his dose and no need for levels currently. Do not increase dose. But no need switch to sirolimus. (keep tac low as gvhd is quiet and viremia).      3/1-3/16: prednisone 100mg every day  3/17 75mg every day   3/31 75 alt with 50  4/6: 75 alt with 25mg every other day  4/12 pred tapered to 60 alternating with 25mg   4/15 In setting of viruses trying to reactivate,GVHD stable: Taper 60/15mg alternating.  4/20 decrease pred further to 50/10.    -4/22 very mild ~10% of back with rash-- recommend TMC cream once daily to mid back. Monitor.   -4/25 plan to taper pred to 50/0 every other day as long as symptoms stable.   -Per discussion with Dr Hill-- continue to taper pred every few days as toelrated given viremias.   - 4/28 if doing okay 40/0 (likely slow to weekly taper at that point).       5.  ID: Afebrile   Otitis media 3/1: s/p levaquin.  Sudafed to dry up fluid behind ear.-- Balance issues- seem to be improving as steroids taper-- monitor.      PPx: ACV, micafungin M/W/F. Pentamidine given 3/9.   4/16 Give azithro 250mg daily- continue (stopped levaquin due to possible tendonitis).     EBV viremia: 51k. No adenopathy on exam. 4/20 CAP CT (w/ contrast): No adenopathy.   -Plan for rituximab 375mg/m2 4/22/22 x1.   - After rituximab x1  will follow EBV viremia as long as remains lower(<50k) would not give additional rituximab doses.   - 4/20 CT chest with new RUL infiltrate. Highly immunocompromised.   -4/22 Fungitell/asp GM pending form today.  See Dr Garcia 4/28, and tentative plan  for BAL 4/29  - Defer to Dr Garcia/serum labs before starting Vori/posaconazole. LFTS are nearly normal but hopeful he will be able to tolerate Azole if needed for new lung infection.   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 4/15 start valcyte 900mg PO BID.   S/p covid vaccine series 12/2021  S/p evusheld    6. Endo: Hx of graves disease; On synthroid 175 mcg daily. TSH normal today no reflex to T4.     7. CV: Norvasc: 2.5mg.    8. GI:   Omeprazole for heartburn  LFTs as above    9. Psych:   - Situational anxiety - lexapro 10mg daily.   - Insomnia: worse on steroids. Ativan, trazadone, melatonin, discussed dose optimization.     RTC: Wed/Friday  Taper pred 50/0 Monday-- cotinue 50/10 over the weekend  - GIve Rituximab x1 today. Plan to just follow ebv titer (as long as lower) and no further rituximab unless titer uptrending.   - 4/28 f/u BMT clinic for labs, provider and repeat labs. Possible plts prior to BAL Friday (currently wouldn't need but 2nd week on induction valcyte).   - 4/26 - Should get COVID swab at [facility] (requested this be scheduled) for BAL Friday.   - f/u CMV and counts closely with valcyte        I spent 30 minutes in the care of this patient today, which included time necessary for preparation for the visit, obtaining history, ordering medications/tests/procedures as medically indicated, review of pertinent medical literature, counseling of the patient, communication of recommendations to the care team, and documentation time.    CATRACHITA MominC  [phone]

## 2022-04-22 NOTE — TELEPHONE ENCOUNTER
CHERYL with direct call back to discuss moving his bronch on 4/29 back an hour to 11AM. Called and spoke with Lamar, his wife, regarding moving his bronch due to Dr. Garcia's availability. Wife is agreeable and will pass the message along (4/29 11Am bronch, arriving 10AM).

## 2022-04-23 LAB
1,3 BETA GLUCAN SER-MCNC: <31 PG/ML
GALACTOMANNAN AG SERPL QL IA: NEGATIVE
GALACTOMANNAN AG SPEC IA-ACNC: 0.07
OBSERVATION IMP: NEGATIVE

## 2022-04-23 ASSESSMENT — ENCOUNTER SYMPTOMS
DOUBLE VISION: 0
DEPRESSION: 0
EYE PAIN: 1
SPEECH CHANGE: 0
MYALGIAS: 0
BACK PAIN: 0
SEIZURES: 0
WEAKNESS: 1
MUSCLE WEAKNESS: 1
PANIC: 0
NUMBNESS: 1
SWOLLEN GLANDS: 0
NERVOUS/ANXIOUS: 1
HEADACHES: 0
BRUISES/BLEEDS EASILY: 1
DECREASED CONCENTRATION: 1
EYE IRRITATION: 1
LOSS OF CONSCIOUSNESS: 0
ARTHRALGIAS: 0
TINGLING: 1
JOINT SWELLING: 0
MUSCLE CRAMPS: 1
MEMORY LOSS: 0
DIZZINESS: 1
DISTURBANCES IN COORDINATION: 0
STIFFNESS: 0
EYE REDNESS: 1
INSOMNIA: 1
TREMORS: 0
NECK PAIN: 0
PARALYSIS: 0
EYE WATERING: 1

## 2022-04-25 LAB
EBV DNA COPIES/ML, INSTRUMENT: ABNORMAL COPIES/ML
EBV DNA SPEC NAA+PROBE-LOG#: 4.8 {LOG_COPIES}/ML

## 2022-04-25 NOTE — PROGRESS NOTES
This is a recent snapshot of the patient's [facility] medical record.  For current drug dose and complete information and questions, call [phone] or In Basket pool, [facility] ([id number])  CSN Number:  [id number]

## 2022-04-26 ENCOUNTER — THERAPY VISIT (OUTPATIENT)
Dept: PHYSICAL THERAPY | Facility: CLINIC | Age: 62
End: 2022-04-26
Attending: PHYSICIAN ASSISTANT
Payer: COMMERCIAL

## 2022-04-26 ENCOUNTER — LAB (OUTPATIENT)
Dept: LAB | Facility: CLINIC | Age: 62
End: 2022-04-26
Payer: COMMERCIAL

## 2022-04-26 DIAGNOSIS — M62.81 GENERALIZED MUSCLE WEAKNESS: ICD-10-CM

## 2022-04-26 DIAGNOSIS — C91.01 ACUTE LYMPHOBLASTIC LEUKEMIA (ALL) IN REMISSION (H): ICD-10-CM

## 2022-04-26 DIAGNOSIS — Z11.59 ENCOUNTER FOR SCREENING FOR OTHER VIRAL DISEASES: ICD-10-CM

## 2022-04-26 PROCEDURE — U0003 INFECTIOUS AGENT DETECTION BY NUCLEIC ACID (DNA OR RNA); SEVERE ACUTE RESPIRATORY SYNDROME CORONAVIRUS 2 (SARS-COV-2) (CORONAVIRUS DISEASE [COVID-19]), AMPLIFIED PROBE TECHNIQUE, MAKING USE OF HIGH THROUGHPUT TECHNOLOGIES AS DESCRIBED BY CMS-2020-01-R: HCPCS

## 2022-04-26 PROCEDURE — 97161 PT EVAL LOW COMPLEX 20 MIN: CPT | Mod: GP | Performed by: PHYSICAL THERAPIST

## 2022-04-26 PROCEDURE — U0005 INFEC AGEN DETEC AMPLI PROBE: HCPCS

## 2022-04-26 PROCEDURE — 97110 THERAPEUTIC EXERCISES: CPT | Mod: GP | Performed by: PHYSICAL THERAPIST

## 2022-04-26 NOTE — PROGRESS NOTES
Physical Therapy Initial Evaluation  Subjective:    Patient Health History  Nik Nava being seen for Generalized weakness, Left ankle weakness/pain.     Problem began: 1/1/2021.   Problem occurred: Developed myopathy while on steroids   Pain is reported as 1/10 on pain scale.  General health as reported by patient is fair.  Pertinent medical history includes: cancer, high blood pressure, numbness/tingling, sleep disorder/apnea and smoking.     Medical allergies: other. Other medical allergies details: Medication Allergy to Bactrim DS.   Surgeries include:  Orthopedic surgery and other. Other surgery history details: Back surgery, micro disc L4.    Current medications:  Anti-depressants, high blood pressure medication, sleep medication, thyroid medication and other. Other medications details: Prednisone and Tacrolimus s/p post bone marrow transplant.    Current occupation is Operations, currently on disability.   Primary job tasks include:  Computer work.                  Therapist Generated HPI Evaluation  Problem details: Pt reports generalized muscle weakness more specifically in the left ankle, now just finding it hard to get up after being on the ground. .         Type of problem:  Left foot and left ankle.    This is a chronic (1/1/21) condition.  Occurance: cancer treatment.  Where condition occurred: for unknown reasons.  Site of Pain: top and bottom of left foot,   Pain is described as aching   Pain radiates to:  Foot. Pain is worse in the P.M..    Associated symptoms:  Buckling/giving out and loss of strength. Exacerbated by: getting up and down off the floor, going up and down stairs.   and relieved by nothing.      Restrictions due to condition include:  Currently not working due to present treatment.  Barriers include:  None as reported by patient.    Patient Health History  Nik Nava being seen for Generalized weakness, Left ankle weakness/pain.     Problem began: 1/1/2021.   Problem  occurred: Developed myopathy while on steroids   Pain is reported as 1/10 on pain scale.  General health as reported by patient is fair.  Pertinent medical history includes: cancer, high blood pressure, numbness/tingling, sleep disorder/apnea and smoking.     Medical allergies: other. Other medical allergies details: Medication Allergy to Bactrim DS.   Surgeries include:  Orthopedic surgery and other. Other surgery history details: Back surgery, micro disc L4.    Current medications:  Anti-depressants, high blood pressure medication, sleep medication, thyroid medication and other. Other medications details: Prednisone and Tacrolimus s/p post bone marrow transplant.    Current occupation is Operations, currently on disability.   Primary job tasks include:  Computer work.                                    Objective:  System    Ankle/Foot Evaluation  ROM:        Strength:    Dorsiflexion:  Left: 2/5   Weak/painful    Pain:   Right: 5/5   Strong/pain free  Pain:  Plantarflexion: Left: 4/5   Weak/pain free  Pain:   Right: 5/5  Strong/pain free  Pain:  Inversion:Left: 5/5  Strong/pain free  Pain:     Right: 5/5  Strong/pain free  Pain:  Eversion:Left: 2/5  Weak/pain free  Pain:  Right: 5/5  Strong/pain free  Pain:              Strength wnl ankle: can toe walk, unable to heel walk.                                                   Hip Evaluation    Hip Strength:    Flexion:   Left: 4/5    Pain: weak/pain free  Right: 4/5    Pain: weak/pain free                    Extension:  Left: 4/5   Pain:weak/pain freeRight: 4/5     Pain: weak/pain free    Abduction:  Left: 4/5      Pain:weak/pain freeRight: 4/5     Pain:weak/pain free  Adduction:  Left: 4/5     Pain:weak/pain freeRight: 4/5    Pain:weak/pain free  Internal Rotation:  Left: 4/5     Pain:weak/pain freeRight: 5/5    Pain:strong/pain free  External Rotation:  Left: 4/5    Pain: weak/pain free  Right: 4/5    Pain: weak/pain free  Knee Flexion:  Left: 5/5     Pain:strong/pain freeRight: 5/5    Pain: strong/pain free  Knee Extension:  Left: 5/5    Pain:strong/pain freeRight: 5/5     Pain: strong/pain free                 Knee Evaluation:  ROM:            Strength:     Extension:  Left: 5/5   Strong/pain free  Pain:      Right: 5/5   Strong/pain free  Pain:  Flexion:  Left: 5/5   Strong/pain free  Pain:      Right: 5/5   Strong/pain free  Pain:    Quad Set Left: Good    Pain:   Quad Set Right: Good    Pain:                  General     ROS    Assessment/Plan:    Patient is a 61 year old male with both sides hip, both sides knee and both sides ankle complaints.    Patient has the following significant findings with corresponding treatment plan.                Diagnosis 1:  Generalized muscle weakness following cancer treatment most significant left ankle DF and peroneals  Pain -  manual therapy, self management, education and home program  Decreased strength - therapeutic exercise, therapeutic activities and home program  Impaired balance - neuro re-education, gait training, therapeutic activities and home program  Impaired gait - gait training and home program  Decreased function - therapeutic activities and home program    Therapy Evaluation Codes:   1) History comprised of:   Personal factors that impact the plan of care:      Time since onset of symptoms.    Comorbidity factors that impact the plan of care are:      Weakness.     Medications impacting care: Steroids.  2) Examination of Body Systems comprised of:   Body structures and functions that impact the plan of care:      Ankle, Hip and Knee.   Activity limitations that impact the plan of care are:      Squatting/kneeling, Stairs and Walking.  3) Clinical presentation characteristics are:   Stable/Uncomplicated.  4) Decision-Making    Low complexity using standardized patient assessment instrument and/or measureable assessment of functional outcome.  Cumulative Therapy Evaluation is: Low complexity.    Previous  and current functional limitations:  (See Goal Flow Sheet for this information)    Short term and Long term goals: (See Goal Flow Sheet for this information)     Communication ability:  Patient appears to be able to clearly communicate and understand verbal and written communication and follow directions correctly.  Treatment Explanation - The following has been discussed with the patient:   RX ordered/plan of care  Anticipated outcomes  Possible risks and side effects  This patient would benefit from PT intervention to resume normal activities.   Rehab potential is excellent.    Frequency:  1 X week, once daily  Duration:  for 10 weeks  Discharge Plan:  Achieve all LTG.  Independent in home treatment program.  Reach maximal therapeutic benefit.    Please refer to the daily flowsheet for treatment today, total treatment time and time spent performing 1:1 timed codes.

## 2022-04-26 NOTE — PROGRESS NOTES
[facility]    OUTPATIENT Physical Therapy ORTHOPEDIC EVALUATION  PLAN OF TREATMENT FOR OUTPATIENT REHABILITATION  (COMPLETE FOR INITIAL CLAIMS ONLY)  Patient's Last Name, First Name, M.I.  YOB: 1960  Nik Nava    Provider s Name:  YOVANI [facility]   Medical Record No.  [id number]   Start of Care Date:  04/26/22   Onset Date:   01/01/21   Type:     _X__PT   ___OT Medical Diagnosis:    Encounter Diagnoses   Name Primary?    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)     Generalized muscle weakness         Treatment Diagnosis:  bilateral LE weakness        Goals:     04/26/22 0500   Treating Provider   Treating Provider Waqar Abdi DPT   Contact Information   Procedure Code: The timed procedures billed today were performed sequentially within this encounter. Therapeutic Exercise   Minutes: Therapeutic exercise 24   Rxs Authorized 10 E&T   Rxs Used 1   Diagnosis bilateral LE weakness   Insurance Medicare   Total Treatment Time (minutes) 40   Timed Code Treatment Minutes 24   SOC Date 04/26/22   Beginning of Cert date period 04/26/22   End of Cert period date 07/05/22   Therapy Frequency 1 x/ week   Predicted Duration of Therapy Intervention (days/wks) 10 weeks   DOI 01/01/21   Date/PN needed/next MD appt 06/25/22   Subjective See Eval   Objective See EVal   Initial Pain level 0/10   Current Pain level 0/10   PTRX Therapeutic Exercise   Therapeutic Exercise 1 Seated Toe Raises/Heel Raises   Therapeutic Exercise Notes 1 3 x 30  25lbs  HEP - Sets: 3 Reps: 30 reps Sessions per day: 1 No Notes   Therapeutic Exercise 2 Theraband Ankle Dorsiflexion   Therapeutic Exercise Notes 2 3 x 30 reps. no weight.  HEP - Sets: 3 Reps: 30 Sessions per day: 2-3 days/ week Notes: off step or book   Therapeutic Exercise 3 Ankle Eversion Against Gravity   Therapeutic Exercise  Notes 3 3 x 30  no weight.  HEP - Sets: 3 Reps: 30  Sessions per day: 1 No Notes       Therapy Frequency:  1 x/ week  Predicted Duration of Therapy Intervention:  10 weeks    Waqar Abdi, PT                 I CERTIFY THE NEED FOR THESE SERVICES FURNISHED UNDER        THIS PLAN OF TREATMENT AND WHILE UNDER MY CARE     (Physician attestation of this document indicates review and certification of the therapy plan).                     Certification Date From:  04/26/22   Certification Date To:  07/05/22    Referring Provider:  Alba Wisdom    Initial Assessment        See Epic Evaluation SOC Date: 04/26/22

## 2022-04-27 LAB — SARS-COV-2 RNA RESP QL NAA+PROBE: NEGATIVE

## 2022-04-28 ENCOUNTER — ONCOLOGY VISIT (OUTPATIENT)
Dept: TRANSPLANT | Facility: CLINIC | Age: 62
End: 2022-04-28
Attending: INTERNAL MEDICINE
Payer: COMMERCIAL

## 2022-04-28 ENCOUNTER — APPOINTMENT (OUTPATIENT)
Dept: LAB | Facility: CLINIC | Age: 62
End: 2022-04-28
Attending: INTERNAL MEDICINE
Payer: COMMERCIAL

## 2022-04-28 ENCOUNTER — PRE VISIT (OUTPATIENT)
Dept: PULMONOLOGY | Facility: CLINIC | Age: 62
End: 2022-04-28
Payer: COMMERCIAL

## 2022-04-28 ENCOUNTER — OFFICE VISIT (OUTPATIENT)
Dept: PULMONOLOGY | Facility: CLINIC | Age: 62
End: 2022-04-28
Attending: INTERNAL MEDICINE
Payer: COMMERCIAL

## 2022-04-28 ENCOUNTER — TELEPHONE (OUTPATIENT)
Dept: PULMONOLOGY | Facility: CLINIC | Age: 62
End: 2022-04-28

## 2022-04-28 VITALS
OXYGEN SATURATION: 96 % | DIASTOLIC BLOOD PRESSURE: 77 MMHG | HEART RATE: 94 BPM | BODY MASS INDEX: 29.49 KG/M2 | WEIGHT: 223.5 LBS | TEMPERATURE: 98 F | RESPIRATION RATE: 16 BRPM | SYSTOLIC BLOOD PRESSURE: 125 MMHG

## 2022-04-28 VITALS
WEIGHT: 223 LBS | BODY MASS INDEX: 29.43 KG/M2 | DIASTOLIC BLOOD PRESSURE: 77 MMHG | SYSTOLIC BLOOD PRESSURE: 125 MMHG | OXYGEN SATURATION: 96 % | HEART RATE: 94 BPM

## 2022-04-28 DIAGNOSIS — C91.01 ACUTE LYMPHOBLASTIC LEUKEMIA (ALL) IN REMISSION (H): ICD-10-CM

## 2022-04-28 DIAGNOSIS — J18.9 PNEUMONIA OF RIGHT UPPER LOBE DUE TO INFECTIOUS ORGANISM: Primary | ICD-10-CM

## 2022-04-28 LAB
ALBUMIN SERPL-MCNC: 2.7 G/DL (ref 3.4–5)
ALP SERPL-CCNC: 161 U/L (ref 40–150)
ALT SERPL W P-5'-P-CCNC: 88 U/L (ref 0–70)
ANION GAP SERPL CALCULATED.3IONS-SCNC: 5 MMOL/L (ref 3–14)
AST SERPL W P-5'-P-CCNC: 72 U/L (ref 0–45)
BASOPHILS # BLD AUTO: 0 10E3/UL (ref 0–0.2)
BASOPHILS NFR BLD AUTO: 1 %
BILIRUB SERPL-MCNC: 0.7 MG/DL (ref 0.2–1.3)
BUN SERPL-MCNC: 24 MG/DL (ref 7–30)
CALCIUM SERPL-MCNC: 9.3 MG/DL (ref 8.5–10.1)
CHLORIDE BLD-SCNC: 105 MMOL/L (ref 94–109)
CO2 SERPL-SCNC: 26 MMOL/L (ref 20–32)
CREAT SERPL-MCNC: 0.93 MG/DL (ref 0.66–1.25)
EOSINOPHIL # BLD AUTO: 0 10E3/UL (ref 0–0.7)
EOSINOPHIL NFR BLD AUTO: 0 %
ERYTHROCYTE [DISTWIDTH] IN BLOOD BY AUTOMATED COUNT: 21 % (ref 10–15)
GFR SERPL CREATININE-BSD FRML MDRD: >90 ML/MIN/1.73M2
GLUCOSE BLD-MCNC: 124 MG/DL (ref 70–99)
HCT VFR BLD AUTO: 43.7 % (ref 40–53)
HGB BLD-MCNC: 15 G/DL (ref 13.3–17.7)
IMM GRANULOCYTES # BLD: 0.1 10E3/UL
IMM GRANULOCYTES NFR BLD: 1 %
LYMPHOCYTES # BLD AUTO: 1.9 10E3/UL (ref 0.8–5.3)
LYMPHOCYTES NFR BLD AUTO: 35 %
MAGNESIUM SERPL-MCNC: 1.5 MG/DL (ref 1.6–2.3)
MCH RBC QN AUTO: 31.1 PG (ref 26.5–33)
MCHC RBC AUTO-ENTMCNC: 34.3 G/DL (ref 31.5–36.5)
MCV RBC AUTO: 91 FL (ref 78–100)
MONOCYTES # BLD AUTO: 0.1 10E3/UL (ref 0–1.3)
MONOCYTES NFR BLD AUTO: 2 %
NEUTROPHILS # BLD AUTO: 3.3 10E3/UL (ref 1.6–8.3)
NEUTROPHILS NFR BLD AUTO: 61 %
NRBC # BLD AUTO: 0 10E3/UL
NRBC BLD AUTO-RTO: 0 /100
PLATELET # BLD AUTO: 63 10E3/UL (ref 150–450)
POTASSIUM BLD-SCNC: 4.5 MMOL/L (ref 3.4–5.3)
PROT SERPL-MCNC: 6.3 G/DL (ref 6.8–8.8)
RBC # BLD AUTO: 4.82 10E6/UL (ref 4.4–5.9)
SODIUM SERPL-SCNC: 136 MMOL/L (ref 133–144)
WBC # BLD AUTO: 5.4 10E3/UL (ref 4–11)

## 2022-04-28 PROCEDURE — G0463 HOSPITAL OUTPT CLINIC VISIT: HCPCS

## 2022-04-28 PROCEDURE — 87799 DETECT AGENT NOS DNA QUANT: CPT

## 2022-04-28 PROCEDURE — 85025 COMPLETE CBC W/AUTO DIFF WBC: CPT

## 2022-04-28 PROCEDURE — 99204 OFFICE O/P NEW MOD 45 MIN: CPT | Performed by: INTERNAL MEDICINE

## 2022-04-28 PROCEDURE — 82310 ASSAY OF CALCIUM: CPT

## 2022-04-28 PROCEDURE — 250N000011 HC RX IP 250 OP 636: Performed by: PHYSICIAN ASSISTANT

## 2022-04-28 PROCEDURE — 83735 ASSAY OF MAGNESIUM: CPT

## 2022-04-28 PROCEDURE — 99215 OFFICE O/P EST HI 40 MIN: CPT

## 2022-04-28 PROCEDURE — 36591 DRAW BLOOD OFF VENOUS DEVICE: CPT

## 2022-04-28 RX ORDER — HEPARIN SODIUM (PORCINE) LOCK FLUSH IV SOLN 100 UNIT/ML 100 UNIT/ML
5 SOLUTION INTRAVENOUS DAILY PRN
Status: DISCONTINUED | OUTPATIENT
Start: 2022-04-28 | End: 2022-04-28 | Stop reason: HOSPADM

## 2022-04-28 RX ADMIN — Medication 5 ML: at 13:56

## 2022-04-28 ASSESSMENT — PAIN SCALES - GENERAL: PAINLEVEL: NO PAIN (0)

## 2022-04-28 NOTE — NURSING NOTE
"Oncology Rooming Note    April 28, 2022 2:17 PM   Nik Nava is a 61 year old male who presents for:    Chief Complaint   Patient presents with     Oncology Clinic Visit     ALL     Port Draw     Labs drawn via port by RN in lab. VS taken      Initial Vitals: /77   Pulse 94   Temp 98  F (36.7  C) (Oral)   Resp 16   Wt 101.4 kg (223 lb 8 oz)   SpO2 96%   BMI 29.49 kg/m   Estimated body mass index is 29.49 kg/m  as calculated from the following:    Height as of 3/25/22: 1.854 m (6' 0.99\").    Weight as of this encounter: 101.4 kg (223 lb 8 oz). Body surface area is 2.29 meters squared.  No Pain (0) Comment: Data Unavailable   No LMP for male patient.  Allergies reviewed: Yes  Medications reviewed: Yes    Medications: MEDICATION REFILLS NEEDED TODAY. Provider was notified.     Pt needs a refill of VALGANCICLOVIR.    Pharmacy name entered into [employer]:    [facility] - [place], [place] - [address] [address] [place], [place] - [phone]    Clinical concerns:     Pt has been tired the last few days.     Pt has appointments coming up with pulmonary and is wondering about meds/med changes.      Hung Baltazar            "

## 2022-04-28 NOTE — NURSING NOTE
Chief Complaint   Patient presents with     New Patient     Post BMT      Vitals were taken and medications were reconciled.     Jenny Thapa RMA  3:49 PM

## 2022-04-28 NOTE — PROGRESS NOTES
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 Noticing more fatigue last few days (rituximab vs tapering pred). Mouth is improving - no oral sores/sensitivities. Rash fully resolved again, just used TMC cream daily for 2-3 days on central back and no back to being healing/brown color. Started PT for steroid induced myopathy. Interestingly pt had complained of left calf pain-- thought could be levaquin induced tendonitis- pain improve with starting azithro, however notable left foot drop. Few months hx of left sided drooling from mouth that he doesn't notice. Discussed pt has hx of bulging disc but no current back pain. Discussed working with PT--- if left foot drop does not improve low threshold for lumbar MRI. Facial drooling/balance would be a more central process which seems unlikely he would have 2 separate issues but encouraged Nik and Lamar to follow his symptoms closely.   No NVD. His left eye has been bothersome more irritated- not consistently using eye ointment at bedtime bc seems to linger and cause blurriness next day. Discuss trying it for few days, doing warm compress to try to help clear it off for the day. Pt followed by optho- if not improved consider restasis.. though odd to be gvhd given other oral/skin/liver GVHD seem to be improving.     Review of Systems                                                                                                                                         12 point Review of systems was o/w negative     PHYSICAL EXAM                                                                                                                                                 KPS: 80  /77   Pulse 94   Temp 98  F (36.7  C) (Oral)   Resp 16   Wt 101.4 kg (223 lb 8 oz)   SpO2 96%   BMI 29.49 kg/m      Wt Readings from Last 4  Encounters:   04/22/22 100.8 kg (222 lb 3.2 oz)   04/20/22 102.1 kg (225 lb)   04/15/22 100.9 kg (222 lb 8 oz)   04/12/22 99.7 kg (219 lb 14.4 oz)      General: NAD.  HEENT: sclera anicteric.  No lichenoid changes or oral ulcers noted on buccal mucosa, inner lips or palate; minimal erythema posterior buccal adjacent to uppre molars.  Lungs:  CTA b/l   no wheezes  CV: RRR  no gallop  Abd; soft no tenderness; BS nl   Ext/ Skin: Hyperpigmentation noted on torso-- all healing/freckled color, full resolution of very mild erythema of midback.   LE 1+ edema has on stockings.  Full ROM UE and LE; full ROM wrists, fingers, ankles  Neuro;  All CN intact; palate and face symmetric.    cGVHD therapy started on February 24 2022  - Baseline NIH scoring 2/24/2022 : skin 2 maculopapular rash/erythema with no sclerotic features, mouth score 1 mild symptoms with lichenoid changes less than 25%, eyes score 2 moderate symptoms without new visual impairments due to KCS requiring lubricant eyedrops more than 3 times a day, overall mild scale for her provider  - 3/25/2022 1 month NIH treatment assessment on PQRST: skin 2, mouth score 1 mild symptoms with NO lichenoid changes, eyes score 2 moderate symptoms w requiring lubricant eyedrops more than 3 times a day, liver score 1 ALT 3.7x ULN and nl bilirubin   - 3/31  Mouth clear;  eyes minimal LFT still abn; no joint or new GI sx  - 4/28 Mouth clear, Left>R eye is mild sclera erythema, lids are pink and irritated appearing, LFT's slow improvement today, no new joint, skin, or GI symptoms.     ASSESSMENT AND PLAN   Nik Nava is a 61 year old male with Ph Pos ALL, day 261 s/p sib allo stem cell transplant    Day -6 (8/4): flu/cy  Day -5 through day -2 (8/5-8/8): flu  Day -1 (8/9): TBI  Day 0 (8/10): transplant     1.  Acute lymphoblastic leukemia, [place] chromosome positive in CR, MRD neg. S/p allo sib PBSCT. (ABO matched)  HCT-CI score: 3 (prior solid tumor)  Day +100 bone marrow  biopsy is 100% donor with no morphologic or flow cytometric evidence of leukemia BCR abl is pending.  - Day +180 restaging BM bx- still in CR  100% donor;  2/16 BCR ABL major breakpoint undetectable.     2.  HEME: Counts are stable currently with pred taper and starting valcyte.   - Transfuse for hgb <7g/dL; plt < 10k.  No transfusions needs  - Pulmonary emboli: He developed a pulmonary emboli in the setting of receiving PEG asparaginase (ie provoked thrombus).  Xarelto complete.   - Counts are tolerating valcyte well.     3.  FEN/Renal:  - Cr stable  - He has a history of renal cancer and resection. Has a single kidney.    4.    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   #Skin GVHD- history of biopsy proven GVHD of the skin 8/30/2021; resolved.   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   # Ocular GVHD: follows with ophthalmology. Maxitrol to lids, continue refreshing drops QID. Just seen by optho eyes look good. F/U 1yr. If eye symptoms not improving low threshold to message Dr Menezes to ask if restasis is a good option for him.   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   - Cont tac to 1mg BID. Level 2.1. Discussed with Dr Hill-- Continue tac at his dose and no need for levels currently. Do not increase dose. But no need switch to sirolimus. (keep tac low as gvhd is quiet and viremia).      3/1-3/16: prednisone 100mg every day  3/17 75mg every day   3/31 75 alt with 50  4/6: 75 alt with 25mg every other day  4/12 pred tapered to 60 alternating with 25mg   4/15 In setting of viruses trying to reactivate,GVHD stable: Taper 60/15mg alternating.  4/20 decrease pred further to 50/10.    -4/25 plan to taper pred to 50/0 every other day as long as symptoms stable.   - 5/2 Pred decrease 40/0 alternating every other day.   - Plan to decrease to 30/0, then 20/0 slowly then discuss with Dr Hill if taper should be  slowed at that point.        5.  ID: Afebrile   Otitis media 3/1: s/p levaquin.  Sudafed to dry up fluid behind ear.-- Balance issues- seem to be improving as steroids taper-- monitor.      PPx: ACV, micafungin M/W/F. Pentamidine given 4/16 4/16 Give azithro 250mg daily- continue (stopped levaquin due to possible tendonitis).     EBV viremia: 66k 4/25 prior to EBV,  4/20 CAP CT (w/ contrast): No adenopathy.   - 4/22 rituximab 375mg/m2  x1.   - After rituximab x1, 4/28 EBV pending today. As long as remains lower EBV(<50k) would not give additional rituximab doses.   - 4/20 CT chest with new RUL infiltrate. Highly immunocompromised.   - 4/22 Fungitell/asp GM were neg   -See Dr Garcia today 4/28, and tentative plan for BAL - 4/29   - Defer to Dr Garcia s before starting Vori/posaconazole. LFTS are nearly normal but hopeful he will be able to tolerate Azole if needed for new lung infection.   - Continue IV micafungin MWF currently. Will f/u Dr Garcia recommendation.     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 4/15 start valcyte 900mg PO BID. 4/20 CMV <137, repeat pending form today- plan to decrease to 450po BID 4/30 - continue 4 weeks as long as CMV <137.   S/p covid vaccine series 12/2021  S/p evusheld    6. Endo: Hx of graves disease; On synthroid 175 mcg daily. TSH normal today no reflex to T4.     7. CV: Norvasc: 2.5mg.    8. GI:   Omeprazole for heartburn  LFTs as above-- improving.     9. Psych:   - Situational anxiety - lexapro 10mg daily.   - Insomnia: worse on steroids. Ativan, trazadone, melatonin, discussed dose optimization. Recommend to trial skipping trazadone on 0 pred days or okay to decrease to 25mg daily. Also encouraged ativan is not a good long term sleep solution.     RTC: Wed-- doing well. Hopeful can go back to weekly visits  - f/y Dr Garcia rec 4/28 /BAl 4/29.   Taper pred 40/0 Monday.   - F/u CMV and EBV levels  - Pt to decrease valcyte 450mg PO BID 4/30 as I anticipate CMV will remains <137 or not detectable - 4  weeks of maintenance then off  - Eyes only GVHD that seems more troublesome-- monitor closely- low threshold for adding restasis - messaging Dr Menezes about other local tx give other GVHD (liver/mouth/skin) quiet.         I spent 40 minutes in the care of this patient today, which included time necessary for preparation for the visit, obtaining history, ordering medications/tests/procedures as medically indicated, review of pertinent medical literature, counseling of the patient, communication of recommendations to the care team, and documentation time.    Nuvia Wisdom PA-C  [phone]    Addendum: BAl today- reviewed Dr Garcia recommendations, discusse with Dr lozoya. Plan to increase micafungin to 150mg IV daily. Lamar called and updated with pain. [facility] has already called to set up delivery.   Dr Lozoya favors at least 2 doses of rituximab. I will order rituximab for next Wednesday when patient is here, Lamar also updated about this plan. Updated scheduling to add infusion appt. Rituximab is ordered and signed in therapy plan.     Nuvia Wisdom   4/29/22 at 1:21pm.

## 2022-04-28 NOTE — LETTER
4/28/2022     RE: Nik Nava  [id number] [facility] [id number]    Dear Colleague,    Thank you for referring your patient, Nik Nava, to the [facility] [place]. Please see a copy of my visit note below.    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 Noticing more fatigue last few days (rituximab vs tapering pred). Mouth is improving - no oral sores/sensitivities. Rash fully resolved again, just used TMC cream daily for 2-3 days on central back and no back to being healing/brown color. Started PT for steroid induced myopathy. Interestingly pt had complained of left calf pain-- thought could be levaquin induced tendonitis- pain improve with starting azithro, however notable left foot drop. Few months hx of left sided drooling from mouth that he doesn't notice. Discussed pt has hx of bulging disc but no current back pain. Discussed working with PT--- if left foot drop does not improve low threshold for lumbar MRI. Facial drooling/balance would be a more central process which seems unlikely he would have 2 separate issues but encouraged Nik and Lamar to follow his symptoms closely.   No NVD. His left eye has been bothersome more irritated- not consistently using eye ointment at bedtime bc seems to linger and cause blurriness next day. Discuss trying it for few days, doing warm compress to try to help clear it off for the day. Pt followed by optho- if not improved consider restasis.. though odd to be gvhd given other oral/skin/liver GVHD seem to be improving.     Review of Systems                                                                                                                                         12 point Review of systems was o/w negative     PHYSICAL EXAM                                                                                                                                                  KPS: 80  /77   Pulse 94   Temp 98  F (36.7  C) (Oral)   Resp 16   Wt 101.4 kg (223 lb 8 oz)   SpO2 96%   BMI 29.49 kg/m      Wt Readings from Last 4 Encounters:   04/22/22 100.8 kg (222 lb 3.2 oz)   04/20/22 102.1 kg (225 lb)   04/15/22 100.9 kg (222 lb 8 oz)   04/12/22 99.7 kg (219 lb 14.4 oz)      General: NAD.  HEENT: sclera anicteric.  No lichenoid changes or oral ulcers noted on buccal mucosa, inner lips or palate; minimal erythema posterior buccal adjacent to uppre molars.  Lungs:  CTA b/l   no wheezes  CV: RRR  no gallop  Abd; soft no tenderness; BS nl   Ext/ Skin: Hyperpigmentation noted on torso-- all healing/freckled color, full resolution of very mild erythema of midback.   LE 1+ edema has on stockings.  Full ROM UE and LE; full ROM wrists, fingers, ankles  Neuro;  All CN intact; palate and face symmetric.    cGVHD therapy started on February 24 2022  - Baseline NIH scoring 2/24/2022 : skin 2 maculopapular rash/erythema with no sclerotic features, mouth score 1 mild symptoms with lichenoid changes less than 25%, eyes score 2 moderate symptoms without new visual impairments due to KCS requiring lubricant eyedrops more than 3 times a day, overall mild scale for her provider  - 3/25/2022 1 month NIH treatment assessment on PQRST: skin 2, mouth score 1 mild symptoms with NO lichenoid changes, eyes score 2 moderate symptoms w requiring lubricant eyedrops more than 3 times a day, liver score 1 ALT 3.7x ULN and nl bilirubin   - 3/31  Mouth clear;  eyes minimal LFT still abn; no joint or new GI sx  - 4/28 Mouth clear, Left>R eye is mild sclera erythema, lids are pink and irritated appearing, LFT's slow improvement today, no new joint, skin, or GI symptoms.     ASSESSMENT AND PLAN   Nik Nava is a 61 year old male with Ph Pos ALL, day 261 s/p sib allo stem cell transplant    Day -6  (8/4): flu/cy  Day -5 through day -2 (8/5-8/8): flu  Day -1 (8/9): TBI  Day 0 (8/10): transplant     1.  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 S/p allo sib PBSCT. (ABO matched)  HCT-CI score: 3 (prior solid tumor)  Day +100 bone marrow biopsy is 100% donor with no morphologic or flow cytometric evidence of leukemia BCR abl is pending.  - Day +180 restaging BM bx- still in CR  100% donor;  2/16 BCR ABL major breakpoint undetectable.     2.  HEME: Counts are stable currently with pred taper and starting valcyte.   - Transfuse for hgb <7g/dL; plt < 10k.  No transfusions needs  - Pulmonary emboli: He developed a pulmonary emboli in the setting of receiving PEG asparaginase (ie provoked thrombus).  Xarelto complete.   - Counts are tolerating valcyte well.     3.  FEN/Renal:  - Cr stable  - He has a history of renal cancer and resection. Has a single kidney.    4.    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   #Skin GVHD- history of biopsy proven GVHD of the skin 8/30/2021; resolved.   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   # Ocular GVHD: follows with ophthalmology. Maxitrol to lids, continue refreshing drops QID. Just seen by optho eyes look good. F/U 1yr. If eye symptoms not improving low threshold to message Dr Menezes to ask if restasis is a good option for him.   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   - Cont tac to 1mg BID. Level 2.1. Discussed with Dr Hill-- Continue tac at his dose and no need for levels currently. Do not increase dose. But no need switch to sirolimus. (keep tac low as gvhd is quiet and viremia).      3/1-3/16: prednisone 100mg every day  3/17 75mg every day   3/31 75 alt with 50  4/6: 75 alt with 25mg every other day  4/12 pred tapered to 60 alternating with 25mg   4/15 In setting of viruses trying to reactivate,GVHD stable: Taper 60/15mg  alternating.  4/20 decrease pred further to 50/10.    -4/25 plan to taper pred to 50/0 every other day as long as symptoms stable.   - 5/2 Pred decrease 40/0 alternating every other day.   - Plan to decrease to 30/0, then 20/0 slowly then discuss with Dr Hill if taper should be slowed at that point.        5.  ID: Afebrile   Otitis media 3/1: s/p levaquin.  Sudafed to dry up fluid behind ear.-- Balance issues- seem to be improving as steroids taper-- monitor.      PPx: ACV, micafungin M/W/F. Pentamidine given 4/16 4/16 Give azithro 250mg daily- continue (stopped levaquin due to possible tendonitis).     EBV viremia: 66k 4/25 prior to EBV,  4/20 CAP CT (w/ contrast): No adenopathy.   - 4/22 rituximab 375mg/m2  x1.   - After rituximab x1, 4/28 EBV pending today. As long as remains lower EBV(<50k) would not give additional rituximab doses.   - 4/20 CT chest with new RUL infiltrate. Highly immunocompromised.   - 4/22 Fungitell/asp GM were neg   -See Dr Garcia today 4/28, and tentative plan for BAL - 4/29   - Defer to Dr Garcia s before starting Vori/posaconazole. LFTS are nearly normal but hopeful he will be able to tolerate Azole if needed for new lung infection.   - Continue IV micafungin MWF currently. Will f/u Dr Garcia recommendation.     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 4/15 start valcyte 900mg PO BID. 4/20 CMV <137, repeat pending form today- plan to decrease to 450po BID 4/30 - continue 4 weeks as long as CMV <137.   S/p covid vaccine series 12/2021  S/p evusheld    6. Endo: Hx of graves disease; On synthroid 175 mcg daily. TSH normal today no reflex to T4.     7. CV: Norvasc: 2.5mg.    8. GI:   Omeprazole for heartburn  LFTs as above-- improving.     9. Psych:   - Situational anxiety - lexapro 10mg daily.   - Insomnia: worse on steroids. Ativan, trazadone, melatonin, discussed dose optimization. Recommend to trial skipping trazadone on 0 pred days or okay to decrease to 25mg daily. Also encouraged ativan is not a good  long term sleep solution.     RTC: Wed-- doing well. Hopeful can go back to weekly visits  - f/y Dr Garcia rec 4/28 /BAl 4/29.   Taper pred 40/0 Monday.   - F/u CMV and EBV levels  - Pt to decrease valcyte 450mg PO BID 4/30 as I anticipate CMV will remains <137 or not detectable - 4 weeks of maintenance then off  - Eyes only GVHD that seems more troublesome-- monitor closely- low threshold for adding restasis - messaging Dr Menezes about other local tx give other GVHD (liver/mouth/skin) quiet.       I spent 40 minutes in the care of this patient today, which included time necessary for preparation for the visit, obtaining history, ordering medications/tests/procedures as medically indicated, review of pertinent medical literature, counseling of the patient, communication of recommendations to the care team, and documentation time.    Nuvia Wisdom PA-C  [phone]    Addendum: BAl today- reviewed Dr Garcia recommendations, discusse with Dr lozoya. Plan to increase micafungin to 150mg IV daily. Lamar called and updated with pain. [facility] has already called to set up delivery.   Dr Lozoya favors at least 2 doses of rituximab. I will order rituximab for next Wednesday when patient is here, Lamar also updated about this plan. Updated scheduling to add infusion appt. Rituximab is ordered and signed in therapy plan.     Nuvia Wisdom   4/29/22 at 1:21pm.

## 2022-04-28 NOTE — TELEPHONE ENCOUNTER
Contacted pt to prep for bronchoscopy tomorrow, 4/29 at 11 am, check in at 10 am.  Reviewed NPO after midnight, may take am meds with small sip of water, per Dr. Garcia.  Will need . No ibuprofen containing products today. Pt verbalized understanding.

## 2022-04-28 NOTE — PROGRESS NOTES
Reason for Visit  Nik Nava is a 61 year old year old male who is being seen for New Patient (Post BMT )    Pulmonary HPI  60 YO with ALL s/p PBSCT in 8-21 (Day + 261) referred to the Pulmonary clinic by Dr. Hill for an evaluation of an abnormal chest CT.  Denies previous history of pulmonary disease. At present no cough, SOB or sputum production.  No fevers or chills.  Diagnosed with cGvHD of eyes, skin, GI tract and liver.  Was placed on high dose steroids (3-1-22) at 100 mg per day; since that time has been tapering down dose, currently on 50 mg every other day. 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  Denies mold exposure at home.  Has cabin but has not been there this Winter.  No tobacco x 3 years, smoked since age 17, few packs per day.  FH- lung cancer in mother.    The patient was seen and examined by Murtaza Garcia MD           Current Outpatient Medications   Medication     amLODIPine (NORVASC) 5 MG tablet     azithromycin (ZITHROMAX) 250 MG tablet     Calcium Carb-Cholecalciferol 600-800 MG-UNIT CHEW     Carboxymethylcell-Glycerin PF (REFRESH RELIEVA PF) 0.5-1 % SOLN     dexamethasone AF (DECADRON) 0.1 MG/ML solution     escitalopram (LEXAPRO) 10 MG tablet     levothyroxine (SYNTHROID/LEVOTHROID) 175 MCG tablet     LORazepam (ATIVAN) 1 MG tablet     melatonin 5 MG CAPS     micafungin (MYCAMINE) 50 MG injection     neomycin-polymyxin-dexamethasone (MAXITROL) 3.5-28005-1.1 ophthalmic ointment     nicotine polacrilex (NICORETTE) 4 MG gum     omeprazole (PRILOSEC) 40 MG DR capsule     predniSONE (DELTASONE) 10 MG tablet     predniSONE (DELTASONE) 20 MG tablet     sennosides (SENOKOT) 8.6 MG tablet     tacrolimus (GENERIC EQUIVALENT) 1 MG capsule     tacrolimus (PROTOPIC) 0.1 % external ointment     traZODone (DESYREL) 50 MG tablet     valGANciclovir (VALCYTE) 450 MG tablet      acyclovir (ZOVIRAX) 800 MG tablet     predniSONE (DELTASONE) 50 MG tablet     predniSONE (DELTASONE) 50 MG tablet     tacrolimus (GENERIC EQUIVALENT) 0.5 MG capsule     No current facility-administered medications for this visit.     Allergies   Allergen Reactions     Sulfamethoxazole W/Trimethoprim Rash     Past Medical History:   Diagnosis Date     Cancer (H)     renal cell     CKD (chronic kidney disease)      Thyroid disease        Past Surgical History:   Procedure Laterality Date     BACK SURGERY  2017     BONE MARROW BIOPSY, BONE SPECIMEN, NEEDLE/TROCAR Left 2021    Procedure: BIOPSY, BONE MARROW;  Surgeon: Jailyn Ny APRN CNP;  Location: [facility] OR     BONE MARROW BIOPSY, BONE SPECIMEN, NEEDLE/TROCAR Left 11/15/2021    Procedure: BIOPSY, BONE MARROW;  Surgeon: Socrates De La Torre;  Location: [facility] OR     BONE MARROW BIOPSY, BONE SPECIMEN, NEEDLE/TROCAR Left 2022    Procedure: BIOPSY, BONE MARROW;  Surgeon: Renetta Wiggins PA-C;  Location: [facility] OR     IR CVC TUNNEL PLACEMENT > 5 YRS OF AGE  2021     IR CVC TUNNEL REMOVAL LEFT  2021     IR LIVER BIOPSY PERCUTANEOUS  2022     PERCUTANEOUS BIOPSY LIVER N/A 2022    Procedure: NEEDLE BIOPSY, LIVER, PERCUTANEOUS;  Surgeon: Trace Castellanos MD;  Location: [facility] OR       Social History     Socioeconomic History     Marital status:      Spouse name: Not on file     Number of children: Not on file     Years of education: Not on file     Highest education level: Not on file   Occupational History     Not on file   Tobacco Use     Smoking status: Former Smoker     Packs/day: 2.00     Years: 30.00     Pack years: 60.00     Quit date: 2019     Years since quittin.9     Smokeless tobacco: Never Used   Substance and Sexual Activity     Alcohol use: Not Currently     Drug use: Never     Sexual activity: Not on file   Other Topics Concern     Parent/sibling w/ CABG, MI or angioplasty before 65F 55M? Not Asked   Social  History Narrative     Not on file     Social Determinants of Health     Financial Resource Strain: Not on file   Food Insecurity: Not on file   Transportation Needs: Not on file   Physical Activity: Not on file   Stress: Not on file   Social Connections: Not on file   Intimate Partner Violence: Not on file   Housing Stability: Not on file       FH: Lung cancer- Mother  ROS Pulmonary  A complete ROS was otherwise negative except as noted in the HPI.  /77   Pulse 94   Wt 101.2 kg (223 lb)   SpO2 96%   BMI 29.43 kg/m    Exam:   GENERAL APPEARANCE: Well developed, well nourished, alert, and in no apparent distress.  EYES: PERRL, EOMI  HENT: Nasal mucosa with no edema and no hyperemia. No nasal polyps.  EARS: Canals clear, TMs normal  MOUTH: Oral mucosa is moist, without any lesions, no tonsillar enlargement, no oropharyngeal exudate.  NECK: supple, no masses, no thyromegaly.  LYMPHATICS: No significant axillary, cervical, or supraclavicular nodes.  RESP:  Good air flow throughout.  No crackles. No rhonchi. No wheezes.  CV: Normal S1, S2, regular rhythm, normal rate. No murmur.  No rub. No gallop. No LE edema.   ABDOMEN:  Bowel sounds normal, soft, nontender, no HSM or masses.   MS: extremities normal. No clubbing. No cyanosis.  SKIN: no rash on limited exam  NEURO: Mentation intact, speech normal, normal strength and tone, normal gait and stance  PSYCH: mentation appears normal. and affect normal/bright  Results:  Recent Results (from the past 168 hour(s))   Aspergillus Galactomannan Antigen    Collection Time: 04/22/22  8:45 AM    Specimen: Line, venous; Blood   Result Value Ref Range    Aspergillus Galactomannan Index 0.07     Aspergillus Galact AG Negative Negative   1,3 Beta D glucan fungitell    Collection Time: 04/22/22  8:45 AM   Result Value Ref Range    (1,3)-Beta-D-Glucan <31 pg/mL    B-D GLUCAN INTERPRETATION (1,3) Negative Negative   EBV DNA PCR Quantitative Whole Blood    Collection Time: 04/22/22   8:45 AM   Result Value Ref Range    EBV DNA Copies/mL 66,762 (H) <=0 copies/mL    EBV log 4.8    Magnesium    Collection Time: 04/22/22  8:45 AM   Result Value Ref Range    Magnesium 1.6 1.6 - 2.3 mg/dL   Comprehensive metabolic panel    Collection Time: 04/22/22  8:45 AM   Result Value Ref Range    Sodium 135 133 - 144 mmol/L    Potassium 4.8 3.4 - 5.3 mmol/L    Chloride 101 94 - 109 mmol/L    Carbon Dioxide (CO2) 29 20 - 32 mmol/L    Anion Gap 5 3 - 14 mmol/L    Urea Nitrogen 29 7 - 30 mg/dL    Creatinine 0.97 0.66 - 1.25 mg/dL    Calcium 9.5 8.5 - 10.1 mg/dL    Glucose 98 70 - 99 mg/dL    Alkaline Phosphatase 206 (H) 40 - 150 U/L    AST 79 (H) 0 - 45 U/L     (H) 0 - 70 U/L    Protein Total 6.3 (L) 6.8 - 8.8 g/dL    Albumin 2.5 (L) 3.4 - 5.0 g/dL    Bilirubin Total 0.7 0.2 - 1.3 mg/dL    GFR Estimate 88 >60 mL/min/1.73m2   CBC with platelets and differential    Collection Time: 04/22/22  8:45 AM   Result Value Ref Range    WBC Count 6.0 4.0 - 11.0 10e3/uL    RBC Count 4.81 4.40 - 5.90 10e6/uL    Hemoglobin 15.0 13.3 - 17.7 g/dL    Hematocrit 42.9 40.0 - 53.0 %    MCV 89 78 - 100 fL    MCH 31.2 26.5 - 33.0 pg    MCHC 35.0 31.5 - 36.5 g/dL    RDW 20.3 (H) 10.0 - 15.0 %    Platelet Count 62 (L) 150 - 450 10e3/uL    % Neutrophils 48 %    % Lymphocytes 47 %    % Monocytes 3 %    % Eosinophils 0 %    % Basophils 0 %    % Immature Granulocytes 2 %    NRBCs per 100 WBC 0 <1 /100    Absolute Neutrophils 2.9 1.6 - 8.3 10e3/uL    Absolute Lymphocytes 2.8 0.8 - 5.3 10e3/uL    Absolute Monocytes 0.2 0.0 - 1.3 10e3/uL    Absolute Eosinophils 0.0 0.0 - 0.7 10e3/uL    Absolute Basophils 0.0 0.0 - 0.2 10e3/uL    Absolute Immature Granulocytes 0.1 <=0.4 10e3/uL    Absolute NRBCs 0.0 10e3/uL   RBC and Platelet Morphology    Collection Time: 04/22/22  8:45 AM   Result Value Ref Range    Platelet Assessment  Automated Count Confirmed. Platelet morphology is normal.     Automated Count Confirmed. Platelet morphology is normal.     RBC Morphology Confirmed RBC Indices    Asymptomatic COVID-19 Virus (Coronavirus) by PCR Nose    Collection Time: 04/26/22  2:58 PM    Specimen: Nose; Swab   Result Value Ref Range    SARS CoV2 PCR Negative Negative, Testing sent to reference lab. Results will be returned via unsolicited result   Magnesium    Collection Time: 04/28/22  2:08 PM   Result Value Ref Range    Magnesium 1.5 (L) 1.6 - 2.3 mg/dL   Comprehensive metabolic panel    Collection Time: 04/28/22  2:08 PM   Result Value Ref Range    Sodium 136 133 - 144 mmol/L    Potassium 4.5 3.4 - 5.3 mmol/L    Chloride 105 94 - 109 mmol/L    Carbon Dioxide (CO2) 26 20 - 32 mmol/L    Anion Gap 5 3 - 14 mmol/L    Urea Nitrogen 24 7 - 30 mg/dL    Creatinine 0.93 0.66 - 1.25 mg/dL    Calcium 9.3 8.5 - 10.1 mg/dL    Glucose 124 (H) 70 - 99 mg/dL    Alkaline Phosphatase 161 (H) 40 - 150 U/L    AST 72 (H) 0 - 45 U/L    ALT 88 (H) 0 - 70 U/L    Protein Total 6.3 (L) 6.8 - 8.8 g/dL    Albumin 2.7 (L) 3.4 - 5.0 g/dL    Bilirubin Total 0.7 0.2 - 1.3 mg/dL    GFR Estimate >90 >60 mL/min/1.73m2   CBC with platelets and differential    Collection Time: 04/28/22  2:08 PM   Result Value Ref Range    WBC Count 5.4 4.0 - 11.0 10e3/uL    RBC Count 4.82 4.40 - 5.90 10e6/uL    Hemoglobin 15.0 13.3 - 17.7 g/dL    Hematocrit 43.7 40.0 - 53.0 %    MCV 91 78 - 100 fL    MCH 31.1 26.5 - 33.0 pg    MCHC 34.3 31.5 - 36.5 g/dL    RDW 21.0 (H) 10.0 - 15.0 %    Platelet Count 63 (L) 150 - 450 10e3/uL    % Neutrophils 61 %    % Lymphocytes 35 %    % Monocytes 2 %    % Eosinophils 0 %    % Basophils 1 %    % Immature Granulocytes 1 %    NRBCs per 100 WBC 0 <1 /100    Absolute Neutrophils 3.3 1.6 - 8.3 10e3/uL    Absolute Lymphocytes 1.9 0.8 - 5.3 10e3/uL    Absolute Monocytes 0.1 0.0 - 1.3 10e3/uL    Absolute Eosinophils 0.0 0.0 - 0.7 10e3/uL    Absolute Basophils 0.0 0.0 - 0.2 10e3/uL    Absolute Immature Granulocytes 0.1 <=0.4 10e3/uL    Absolute NRBCs 0.0 10e3/uL       Assessment and  plan:   60 YO with ALL s/p BMT complicated by cGvHD requiring high dose steroids.  Now with nodular infiltrate on CT concerning for an atypical infection (Fungal, Nocardia, or atypical Mycobacteria).  Needs further invasive testing to attempt to identify a pathogen; emperic full dose anti-fungal awaiting  BAL culture results.    1. Bronchoscopy with BAL of anterior segment RUL in am  2. Needs to be on treatment dosage of anti-fungal  3. Follow-up CT in one month    RTC in one month.  Patient advised to contact the clinic with any questions or concerns prior to his next clinic visit        Answers for HPI/ROS submitted by the patient on 4/23/2022  General Symptoms: No  Skin Symptoms: No  HENT Symptoms: No  EYE SYMPTOMS: Yes  HEART SYMPTOMS: No  LUNG SYMPTOMS: No  INTESTINAL SYMPTOMS: No  URINARY SYMPTOMS: No  REPRODUCTIVE SYMPTOMS: No  SKELETAL SYMPTOMS: Yes  BLOOD SYMPTOMS: Yes  NERVOUS SYSTEM SYMPTOMS: Yes  MENTAL HEALTH SYMPTOMS: Yes  Eye pain: Yes  Vision loss: No  Dry eyes: Yes  Watery eyes: Yes  Eye bulging: No  Double vision: No  Flashing of lights: No  Spots: No  Floaters: No  Redness: Yes  Crossed eyes: No  Tunnel Vision: No  Yellowing of eyes: No  Eye irritation: Yes  Back pain: No  Muscle aches: No  Neck pain: No  Swollen joints: No  Joint pain: No  Bone pain: No  Muscle cramps: Yes  Muscle weakness: Yes  Joint stiffness: No  Bone fracture: No  Anemia: No  Swollen glands: No  Easy bleeding or bruising: Yes  Edema or swelling: Yes  Trouble with coordination: No  Dizziness or trouble with balance: Yes  Fainting or black-out spells: No  Memory loss: No  Headache: No  Seizures: No  Speech problems: No  Tingling: Yes  Tremor: No  Weakness: Yes  Difficulty walking: No  Paralysis: No  Numbness: Yes  Nervous or Anxious: Yes  Depression: No  Trouble sleeping: Yes  Trouble thinking or concentrating: Yes  Mood changes: No  Panic attacks: No

## 2022-04-28 NOTE — NURSING NOTE
Chief Complaint   Patient presents with     Oncology Clinic Visit     ALL     Port Draw     Labs drawn via port by RN in lab. VS taken      Port accessed with 20 gauge 3/4 in power needle by RN, labs collected, line flushed with saline and heparin.  Vitals taken. Pt checked in for next appointment.    Alba Henry RN

## 2022-04-28 NOTE — LETTER
4/28/2022         RE: Nik Nava  [id number] [facility] [id number]        Dear Colleague,    Thank you for referring your patient, Nik Nava, to the [facility] FOR LUNG SCIENCE AND [facility] [place]. Please see a copy of my visit note below.    Reason for Visit  Nik Nava is a 61 year old year old male who is being seen for New Patient (Post BMT )    Pulmonary HPI  60 YO with ALL s/p PBSCT in 8-21 (Day + 261) referred to the Pulmonary clinic by Dr. Hill for an evaluation of an abnormal chest CT.  Denies previous history of pulmonary disease. At present no cough, SOB or sputum production.  No fevers or chills.  Diagnosed with cGvHD of eyes, skin, GI tract and liver.  Was placed on high dose steroids (3-1-22) at 100 mg per day; since that time has been tapering down dose, currently on 50 mg every other day. 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  Denies mold exposure at home.  Has cabin but has not been there this Winter.  No tobacco x 3 years, smoked since age 17, few packs per day.  FH- lung cancer in mother.    The patient was seen and examined by Murtaza Garcia MD           Current Outpatient Medications   Medication     amLODIPine (NORVASC) 5 MG tablet     azithromycin (ZITHROMAX) 250 MG tablet     Calcium Carb-Cholecalciferol 600-800 MG-UNIT CHEW     Carboxymethylcell-Glycerin PF (REFRESH RELIEVA PF) 0.5-1 % SOLN     dexamethasone AF (DECADRON) 0.1 MG/ML solution     escitalopram (LEXAPRO) 10 MG tablet     levothyroxine (SYNTHROID/LEVOTHROID) 175 MCG tablet     LORazepam (ATIVAN) 1 MG tablet     melatonin 5 MG CAPS     micafungin (MYCAMINE) 50 MG injection     neomycin-polymyxin-dexamethasone (MAXITROL) 3.5-04398-9.1 ophthalmic ointment     nicotine polacrilex (NICORETTE) 4 MG gum     omeprazole (PRILOSEC) 40 MG DR capsule     predniSONE  (DELTASONE) 10 MG tablet     predniSONE (DELTASONE) 20 MG tablet     sennosides (SENOKOT) 8.6 MG tablet     tacrolimus (GENERIC EQUIVALENT) 1 MG capsule     tacrolimus (PROTOPIC) 0.1 % external ointment     traZODone (DESYREL) 50 MG tablet     valGANciclovir (VALCYTE) 450 MG tablet     acyclovir (ZOVIRAX) 800 MG tablet     predniSONE (DELTASONE) 50 MG tablet     predniSONE (DELTASONE) 50 MG tablet     tacrolimus (GENERIC EQUIVALENT) 0.5 MG capsule     No current facility-administered medications for this visit.     Allergies   Allergen Reactions     Sulfamethoxazole W/Trimethoprim Rash     Past Medical History:   Diagnosis Date     Cancer (H)     renal cell     CKD (chronic kidney disease)      Thyroid disease        Past Surgical History:   Procedure Laterality Date     BACK SURGERY  2017     BONE MARROW BIOPSY, BONE SPECIMEN, NEEDLE/TROCAR Left 9/2/2021    Procedure: BIOPSY, BONE MARROW;  Surgeon: Jailyn Ny APRN CNP;  Location: [facility] OR     BONE MARROW BIOPSY, BONE SPECIMEN, NEEDLE/TROCAR Left 11/15/2021    Procedure: BIOPSY, BONE MARROW;  Surgeon: Socrates De La Torre;  Location: [facility] OR     BONE MARROW BIOPSY, BONE SPECIMEN, NEEDLE/TROCAR Left 2/7/2022    Procedure: BIOPSY, BONE MARROW;  Surgeon: Renetta Wiggins PA-C;  Location: [facility] OR     IR CVC TUNNEL PLACEMENT > 5 YRS OF AGE  8/4/2021     IR CVC TUNNEL REMOVAL LEFT  9/2/2021     IR LIVER BIOPSY PERCUTANEOUS  2/21/2022     PERCUTANEOUS BIOPSY LIVER N/A 2/21/2022    Procedure: NEEDLE BIOPSY, LIVER, PERCUTANEOUS;  Surgeon: Trace Castellanos MD;  Location: [facility] OR       Social History     Socioeconomic History     Marital status:      Spouse name: Not on file     Number of children: Not on file     Years of education: Not on file     Highest education level: Not on file   Occupational History     Not on file   Tobacco Use     Smoking status: Former Smoker     Packs/day: 2.00     Years: 30.00     Pack years: 60.00     Quit date:  2019     Years since quittin.9     Smokeless tobacco: Never Used   Substance and Sexual Activity     Alcohol use: Not Currently     Drug use: Never     Sexual activity: Not on file   Other Topics Concern     Parent/sibling w/ CABG, MI or angioplasty before 65F 55M? Not Asked   Social History Narrative     Not on file     Social Determinants of Health     Financial Resource Strain: Not on file   Food Insecurity: Not on file   Transportation Needs: Not on file   Physical Activity: Not on file   Stress: Not on file   Social Connections: Not on file   Intimate Partner Violence: Not on file   Housing Stability: Not on file       FH: Lung cancer- Mother  ROS Pulmonary  A complete ROS was otherwise negative except as noted in the HPI.  /77   Pulse 94   Wt 101.2 kg (223 lb)   SpO2 96%   BMI 29.43 kg/m    Exam:   GENERAL APPEARANCE: Well developed, well nourished, alert, and in no apparent distress.  EYES: PERRL, EOMI  HENT: Nasal mucosa with no edema and no hyperemia. No nasal polyps.  EARS: Canals clear, TMs normal  MOUTH: Oral mucosa is moist, without any lesions, no tonsillar enlargement, no oropharyngeal exudate.  NECK: supple, no masses, no thyromegaly.  LYMPHATICS: No significant axillary, cervical, or supraclavicular nodes.  RESP:  Good air flow throughout.  No crackles. No rhonchi. No wheezes.  CV: Normal S1, S2, regular rhythm, normal rate. No murmur.  No rub. No gallop. No LE edema.   ABDOMEN:  Bowel sounds normal, soft, nontender, no HSM or masses.   MS: extremities normal. No clubbing. No cyanosis.  SKIN: no rash on limited exam  NEURO: Mentation intact, speech normal, normal strength and tone, normal gait and stance  PSYCH: mentation appears normal. and affect normal/bright  Results:  Recent Results (from the past 168 hour(s))   Aspergillus Galactomannan Antigen    Collection Time: 22  8:45 AM    Specimen: Line, venous; Blood   Result Value Ref Range    Aspergillus Galactomannan Index  0.07     Aspergillus Galact AG Negative Negative   1,3 Beta D glucan fungitell    Collection Time: 04/22/22  8:45 AM   Result Value Ref Range    (1,3)-Beta-D-Glucan <31 pg/mL    B-D GLUCAN INTERPRETATION (1,3) Negative Negative   EBV DNA PCR Quantitative Whole Blood    Collection Time: 04/22/22  8:45 AM   Result Value Ref Range    EBV DNA Copies/mL 66,762 (H) <=0 copies/mL    EBV log 4.8    Magnesium    Collection Time: 04/22/22  8:45 AM   Result Value Ref Range    Magnesium 1.6 1.6 - 2.3 mg/dL   Comprehensive metabolic panel    Collection Time: 04/22/22  8:45 AM   Result Value Ref Range    Sodium 135 133 - 144 mmol/L    Potassium 4.8 3.4 - 5.3 mmol/L    Chloride 101 94 - 109 mmol/L    Carbon Dioxide (CO2) 29 20 - 32 mmol/L    Anion Gap 5 3 - 14 mmol/L    Urea Nitrogen 29 7 - 30 mg/dL    Creatinine 0.97 0.66 - 1.25 mg/dL    Calcium 9.5 8.5 - 10.1 mg/dL    Glucose 98 70 - 99 mg/dL    Alkaline Phosphatase 206 (H) 40 - 150 U/L    AST 79 (H) 0 - 45 U/L     (H) 0 - 70 U/L    Protein Total 6.3 (L) 6.8 - 8.8 g/dL    Albumin 2.5 (L) 3.4 - 5.0 g/dL    Bilirubin Total 0.7 0.2 - 1.3 mg/dL    GFR Estimate 88 >60 mL/min/1.73m2   CBC with platelets and differential    Collection Time: 04/22/22  8:45 AM   Result Value Ref Range    WBC Count 6.0 4.0 - 11.0 10e3/uL    RBC Count 4.81 4.40 - 5.90 10e6/uL    Hemoglobin 15.0 13.3 - 17.7 g/dL    Hematocrit 42.9 40.0 - 53.0 %    MCV 89 78 - 100 fL    MCH 31.2 26.5 - 33.0 pg    MCHC 35.0 31.5 - 36.5 g/dL    RDW 20.3 (H) 10.0 - 15.0 %    Platelet Count 62 (L) 150 - 450 10e3/uL    % Neutrophils 48 %    % Lymphocytes 47 %    % Monocytes 3 %    % Eosinophils 0 %    % Basophils 0 %    % Immature Granulocytes 2 %    NRBCs per 100 WBC 0 <1 /100    Absolute Neutrophils 2.9 1.6 - 8.3 10e3/uL    Absolute Lymphocytes 2.8 0.8 - 5.3 10e3/uL    Absolute Monocytes 0.2 0.0 - 1.3 10e3/uL    Absolute Eosinophils 0.0 0.0 - 0.7 10e3/uL    Absolute Basophils 0.0 0.0 - 0.2 10e3/uL    Absolute Immature  Granulocytes 0.1 <=0.4 10e3/uL    Absolute NRBCs 0.0 10e3/uL   RBC and Platelet Morphology    Collection Time: 04/22/22  8:45 AM   Result Value Ref Range    Platelet Assessment  Automated Count Confirmed. Platelet morphology is normal.     Automated Count Confirmed. Platelet morphology is normal.    RBC Morphology Confirmed RBC Indices    Asymptomatic COVID-19 Virus (Coronavirus) by PCR Nose    Collection Time: 04/26/22  2:58 PM    Specimen: Nose; Swab   Result Value Ref Range    SARS CoV2 PCR Negative Negative, Testing sent to reference lab. Results will be returned via unsolicited result   Magnesium    Collection Time: 04/28/22  2:08 PM   Result Value Ref Range    Magnesium 1.5 (L) 1.6 - 2.3 mg/dL   Comprehensive metabolic panel    Collection Time: 04/28/22  2:08 PM   Result Value Ref Range    Sodium 136 133 - 144 mmol/L    Potassium 4.5 3.4 - 5.3 mmol/L    Chloride 105 94 - 109 mmol/L    Carbon Dioxide (CO2) 26 20 - 32 mmol/L    Anion Gap 5 3 - 14 mmol/L    Urea Nitrogen 24 7 - 30 mg/dL    Creatinine 0.93 0.66 - 1.25 mg/dL    Calcium 9.3 8.5 - 10.1 mg/dL    Glucose 124 (H) 70 - 99 mg/dL    Alkaline Phosphatase 161 (H) 40 - 150 U/L    AST 72 (H) 0 - 45 U/L    ALT 88 (H) 0 - 70 U/L    Protein Total 6.3 (L) 6.8 - 8.8 g/dL    Albumin 2.7 (L) 3.4 - 5.0 g/dL    Bilirubin Total 0.7 0.2 - 1.3 mg/dL    GFR Estimate >90 >60 mL/min/1.73m2   CBC with platelets and differential    Collection Time: 04/28/22  2:08 PM   Result Value Ref Range    WBC Count 5.4 4.0 - 11.0 10e3/uL    RBC Count 4.82 4.40 - 5.90 10e6/uL    Hemoglobin 15.0 13.3 - 17.7 g/dL    Hematocrit 43.7 40.0 - 53.0 %    MCV 91 78 - 100 fL    MCH 31.1 26.5 - 33.0 pg    MCHC 34.3 31.5 - 36.5 g/dL    RDW 21.0 (H) 10.0 - 15.0 %    Platelet Count 63 (L) 150 - 450 10e3/uL    % Neutrophils 61 %    % Lymphocytes 35 %    % Monocytes 2 %    % Eosinophils 0 %    % Basophils 1 %    % Immature Granulocytes 1 %    NRBCs per 100 WBC 0 <1 /100    Absolute Neutrophils 3.3 1.6 -  8.3 10e3/uL    Absolute Lymphocytes 1.9 0.8 - 5.3 10e3/uL    Absolute Monocytes 0.1 0.0 - 1.3 10e3/uL    Absolute Eosinophils 0.0 0.0 - 0.7 10e3/uL    Absolute Basophils 0.0 0.0 - 0.2 10e3/uL    Absolute Immature Granulocytes 0.1 <=0.4 10e3/uL    Absolute NRBCs 0.0 10e3/uL       Assessment and plan:   60 YO with ALL s/p BMT complicated by cGvHD requiring high dose steroids.  Now with nodular infiltrate on CT concerning for an atypical infection (Fungal, Nocardia, or atypical Mycobacteria).  Needs further invasive testing to attempt to identify a pathogen; emperic full dose anti-fungal awaiting  BAL culture results.    1. Bronchoscopy with BAL of anterior segment RUL in am  2. Needs to be on treatment dosage of anti-fungal  3. Follow-up CT in one month    RTC in one month.  Patient advised to contact the clinic with any questions or concerns prior to his next clinic visit        Answers for HPI/ROS submitted by the patient on 4/23/2022  General Symptoms: No  Skin Symptoms: No  HENT Symptoms: No  EYE SYMPTOMS: Yes  HEART SYMPTOMS: No  LUNG SYMPTOMS: No  INTESTINAL SYMPTOMS: No  URINARY SYMPTOMS: No  REPRODUCTIVE SYMPTOMS: No  SKELETAL SYMPTOMS: Yes  BLOOD SYMPTOMS: Yes  NERVOUS SYSTEM SYMPTOMS: Yes  MENTAL HEALTH SYMPTOMS: Yes  Eye pain: Yes  Vision loss: No  Dry eyes: Yes  Watery eyes: Yes  Eye bulging: No  Double vision: No  Flashing of lights: No  Spots: No  Floaters: No  Redness: Yes  Crossed eyes: No  Tunnel Vision: No  Yellowing of eyes: No  Eye irritation: Yes  Back pain: No  Muscle aches: No  Neck pain: No  Swollen joints: No  Joint pain: No  Bone pain: No  Muscle cramps: Yes  Muscle weakness: Yes  Joint stiffness: No  Bone fracture: No  Anemia: No  Swollen glands: No  Easy bleeding or bruising: Yes  Edema or swelling: Yes  Trouble with coordination: No  Dizziness or trouble with balance: Yes  Fainting or black-out spells: No  Memory loss: No  Headache: No  Seizures: No  Speech problems: No  Tingling:  Yes  Tremor: No  Weakness: Yes  Difficulty walking: No  Paralysis: No  Numbness: Yes  Nervous or Anxious: Yes  Depression: No  Trouble sleeping: Yes  Trouble thinking or concentrating: Yes  Mood changes: No  Panic attacks: No          Again, thank you for allowing me to participate in the care of your patient.        Sincerely,        Murtaza Garcia MD

## 2022-04-29 ENCOUNTER — HOSPITAL ENCOUNTER (OUTPATIENT)
Facility: CLINIC | Age: 62
Discharge: HOME | End: 2022-04-29
Attending: INTERNAL MEDICINE | Admitting: INTERNAL MEDICINE
Payer: COMMERCIAL

## 2022-04-29 ENCOUNTER — HOME INFUSION (PRE-WILLOW HOME INFUSION) (OUTPATIENT)
Dept: PHARMACY | Facility: CLINIC | Age: 62
End: 2022-04-29

## 2022-04-29 VITALS
WEIGHT: 226.63 LBS | HEART RATE: 65 BPM | OXYGEN SATURATION: 94 % | RESPIRATION RATE: 18 BRPM | SYSTOLIC BLOOD PRESSURE: 115 MMHG | BODY MASS INDEX: 30.04 KG/M2 | TEMPERATURE: 97.6 F | DIASTOLIC BLOOD PRESSURE: 77 MMHG | HEIGHT: 73 IN

## 2022-04-29 LAB
APPEARANCE FLD: CLEAR
BRONCHOSCOPY: NORMAL
CELL COUNT BODY FLUID SOURCE: NORMAL
CMV DNA SPEC NAA+PROBE-ACNC: NOT DETECTED IU/ML
COLOR FLD: COLORLESS
EBV DNA # SPEC NAA+PROBE: <500 COPIES/ML
EBV DNA SPEC NAA+PROBE-LOG#: <2.7 {LOG_COPIES}/ML
GRAM STAIN RESULT: NORMAL
GRAM STAIN RESULT: NORMAL
LYMPHOCYTES NFR FLD MANUAL: 8 %
MONOS+MACROS NFR FLD MANUAL: NORMAL %
NEUTS BAND NFR FLD MANUAL: 2 %
OTHER CELLS FLD MANUAL: 91 %
PATH REPORT.COMMENTS IMP SPEC: NORMAL
PATH REPORT.FINAL DX SPEC: NORMAL
PATH REPORT.GROSS SPEC: NORMAL
PATH REPORT.MICROSCOPIC SPEC OTHER STN: NORMAL
PATH REPORT.RELEVANT HX SPEC: NORMAL
WBC # FLD AUTO: 176 /UL

## 2022-04-29 PROCEDURE — G0500 MOD SEDAT ENDO SERVICE >5YRS: HCPCS | Performed by: INTERNAL MEDICINE

## 2022-04-29 PROCEDURE — 88312 SPECIAL STAINS GROUP 1: CPT | Mod: TC | Performed by: INTERNAL MEDICINE

## 2022-04-29 PROCEDURE — 87206 SMEAR FLUORESCENT/ACID STAI: CPT | Performed by: INTERNAL MEDICINE

## 2022-04-29 PROCEDURE — 250N000009 HC RX 250: Performed by: INTERNAL MEDICINE

## 2022-04-29 PROCEDURE — 84999 UNLISTED CHEMISTRY PROCEDURE: CPT | Performed by: INTERNAL MEDICINE

## 2022-04-29 PROCEDURE — 88108 CYTOPATH CONCENTRATE TECH: CPT | Mod: 26 | Performed by: PATHOLOGY

## 2022-04-29 PROCEDURE — 87205 SMEAR GRAM STAIN: CPT | Performed by: INTERNAL MEDICINE

## 2022-04-29 PROCEDURE — 87070 CULTURE OTHR SPECIMN AEROBIC: CPT | Performed by: INTERNAL MEDICINE

## 2022-04-29 PROCEDURE — 250N000011 HC RX IP 250 OP 636: Performed by: INTERNAL MEDICINE

## 2022-04-29 PROCEDURE — 99152 MOD SED SAME PHYS/QHP 5/>YRS: CPT | Performed by: INTERNAL MEDICINE

## 2022-04-29 PROCEDURE — 89051 BODY FLUID CELL COUNT: CPT | Performed by: INTERNAL MEDICINE

## 2022-04-29 PROCEDURE — 31624 DX BRONCHOSCOPE/LAVAGE: CPT | Performed by: INTERNAL MEDICINE

## 2022-04-29 PROCEDURE — 87798 DETECT AGENT NOS DNA AMP: CPT | Performed by: INTERNAL MEDICINE

## 2022-04-29 PROCEDURE — 87102 FUNGUS ISOLATION CULTURE: CPT | Performed by: INTERNAL MEDICINE

## 2022-04-29 PROCEDURE — 0202U NFCT DS 22 TRGT SARS-COV-2: CPT | Performed by: INTERNAL MEDICINE

## 2022-04-29 PROCEDURE — 87305 ASPERGILLUS AG IA: CPT | Performed by: INTERNAL MEDICINE

## 2022-04-29 PROCEDURE — 87102 FUNGUS ISOLATION CULTURE: CPT | Mod: XU | Performed by: INTERNAL MEDICINE

## 2022-04-29 PROCEDURE — 88312 SPECIAL STAINS GROUP 1: CPT | Mod: 26 | Performed by: PATHOLOGY

## 2022-04-29 RX ORDER — LIDOCAINE HYDROCHLORIDE 10 MG/ML
INJECTION, SOLUTION INFILTRATION; PERINEURAL PRN
Status: COMPLETED | OUTPATIENT
Start: 2022-04-29 | End: 2022-04-29

## 2022-04-29 RX ORDER — MAGNESIUM HYDROXIDE 1200 MG/15ML
LIQUID ORAL PRN
Status: COMPLETED | OUTPATIENT
Start: 2022-04-29 | End: 2022-04-29

## 2022-04-29 RX ORDER — FENTANYL CITRATE 50 UG/ML
INJECTION, SOLUTION INTRAMUSCULAR; INTRAVENOUS PRN
Status: COMPLETED | OUTPATIENT
Start: 2022-04-29 | End: 2022-04-29

## 2022-04-29 RX ORDER — LIDOCAINE HYDROCHLORIDE 40 MG/ML
INJECTION, SOLUTION RETROBULBAR PRN
Status: COMPLETED | OUTPATIENT
Start: 2022-04-29 | End: 2022-04-29

## 2022-04-29 RX ORDER — MICAFUNGIN 10 MG/ML
150 INJECTION, POWDER, LYOPHILIZED, FOR SOLUTION INTRAVENOUS
Qty: 78 EACH | Refills: 0 | COMMUNITY
Start: 2022-04-29 | End: 2022-09-06

## 2022-04-29 RX ORDER — HEPARIN SODIUM,PORCINE 10 UNIT/ML
5-10 VIAL (ML) INTRAVENOUS EVERY 24 HOURS
Status: CANCELLED | OUTPATIENT
Start: 2022-04-29

## 2022-04-29 RX ADMIN — SODIUM CHLORIDE 120 ML: 900 IRRIGANT IRRIGATION at 11:28

## 2022-04-29 RX ADMIN — MIDAZOLAM 1 MG: 1 INJECTION INTRAMUSCULAR; INTRAVENOUS at 11:14

## 2022-04-29 RX ADMIN — LIDOCAINE HYDROCHLORIDE 12 ML: 10 INJECTION, SOLUTION INFILTRATION; PERINEURAL at 11:27

## 2022-04-29 RX ADMIN — FENTANYL CITRATE 50 MCG: 50 INJECTION, SOLUTION INTRAMUSCULAR; INTRAVENOUS at 11:17

## 2022-04-29 RX ADMIN — FENTANYL CITRATE 25 MCG: 50 INJECTION, SOLUTION INTRAMUSCULAR; INTRAVENOUS at 11:23

## 2022-04-29 RX ADMIN — LIDOCAINE HYDROCHLORIDE 9 ML: 40 INJECTION, SOLUTION RETROBULBAR; TOPICAL at 11:26

## 2022-04-29 RX ADMIN — MIDAZOLAM 0.5 MG: 1 INJECTION INTRAMUSCULAR; INTRAVENOUS at 11:26

## 2022-04-29 RX ADMIN — FENTANYL CITRATE 50 MCG: 50 INJECTION, SOLUTION INTRAMUSCULAR; INTRAVENOUS at 11:14

## 2022-04-29 RX ADMIN — MIDAZOLAM 0.5 MG: 1 INJECTION INTRAMUSCULAR; INTRAVENOUS at 11:20

## 2022-04-29 RX ADMIN — MIDAZOLAM 1 MG: 1 INJECTION INTRAMUSCULAR; INTRAVENOUS at 11:17

## 2022-04-29 RX ADMIN — FENTANYL CITRATE 25 MCG: 50 INJECTION, SOLUTION INTRAMUSCULAR; INTRAVENOUS at 11:20

## 2022-04-29 RX ADMIN — FENTANYL CITRATE 25 MCG: 50 INJECTION, SOLUTION INTRAMUSCULAR; INTRAVENOUS at 11:25

## 2022-04-29 RX ADMIN — MIDAZOLAM 0.5 MG: 1 INJECTION INTRAMUSCULAR; INTRAVENOUS at 11:23

## 2022-04-29 RX ADMIN — LIDOCAINE HYDROCHLORIDE 9 ML: 40 INJECTION, SOLUTION RETROBULBAR; TOPICAL at 11:25

## 2022-04-29 NOTE — OR NURSING
Pt tolerated Bronchoscopy with BAL of RUL, very well. Pt was on RA when he left the procedure room.

## 2022-04-29 NOTE — DISCHARGE INSTRUCTIONS
Discharge Instructions after Bronchoscopy    Activity  ___ You had medicine to relax and for pain. You may feel dizzy or sleepy.  For 24 hours:   Do not drive or use heavy equipment.   Do not make important decisions.   Do not drink any alcohol.    Diet  ___ When you can swallow easily, you may go back to your regular diet, medicines  and light exercise.    Follow-up  ___ We took small tissue or fluid samples to study. We will call you with the results in about 10 business days.    Call right away if you have:   Unusual chest pain   Temperature above 100.6  F (37.5  C)   Coughing that does not stop.    If you have severe pain, bleeding, or shortness of breath, go to an emergency room.    If you have questions, call:  Monday to Friday, 8 a.m. to 4:30 p.m.  Adult Pulmonology Clinic: [phone]    After hours:  Hospital: [phone] (Ask for the pulmonary fellow on call)

## 2022-04-29 NOTE — H&P
"[facility] Anesthesia Pre-op History and Physical    Nik Nava MRN# [id number]   Age: 61 year old YOB: 1960      Date of Surgery: 4-29-22 Location [facility]      Date of Exam 4/29/2022 Facility [facility]       Home clinic: [facility] [place]  Primary care provider: Wojciech Soares         Chief Complaint and/or Reason for Procedure:   No chief complaint on file.    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 No     No current facility-administered medications for this encounter.             Allergies:      Allergies   Allergen Reactions     Sulfamethoxazole W/Trimethoprim Rash     Allergy to Latex? No  Allergy to tape?   No  Intolerances: None            Physical Exam:   All vitals have been reviewed  Patient Vitals for the past 8 hrs:   BP Temp Temp src Pulse Resp SpO2 Height Weight   04/29/22 1005 (!) 125/92 97.8  F (36.6  C) Oral 78 18 98 % 1.854 m (6' 1\") 102.8 kg (226 lb 10.1 oz)     No intake/output data recorded.  None          Lab / Radiology Results:   CT chest reviewed         Anesthetic risk and/or ASA classification: 2       Murtaza Garcia MD     "

## 2022-04-29 NOTE — PROGRESS NOTES
[facility]    OUTPATIENT Physical Therapy ORTHOPEDIC EVALUATION  PLAN OF TREATMENT FOR OUTPATIENT REHABILITATION  (COMPLETE FOR INITIAL CLAIMS ONLY)  Patient's Last Name, First Name, M.I.  YOB: 1960  Nik Nava    Provider s Name:  [facility]   Medical Record No.  [id number]   Start of Care Date:  04/26/22   Onset Date:   01/01/21   Type:     _X__PT   ___OT Medical Diagnosis:    Encounter Diagnoses   Name Primary?    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)     Generalized muscle weakness         Treatment Diagnosis:  bilateral LE weakness        Goals:     04/26/22 0500   Body Part   Goals listed below are for general muscle weakness / left ankle   Other Goal   Activity getting up and down from floor   Current Functional Level unable to get off floor without assistance   STG [employer] Performance be able to get off the floor with use of one UE   Performance Level with one UE   Rationale get on and of floor for home projects   Due Date 05/24/22   LTG [employer] get on and off floor   Performance Level without use of UE   Rationale for safety iwth doing home projects playing with grandkids   Due Date 07/05/22       Therapy Frequency:  1 x/ week  Predicted Duration of Therapy Intervention:  10 weeks    Waqar Abdi, PT                 I CERTIFY THE NEED FOR THESE SERVICES FURNISHED UNDER        THIS PLAN OF TREATMENT AND WHILE UNDER MY CARE     (Physician attestation of this document indicates review and certification of the therapy plan).                     Certification Date From:  04/26/22   Certification Date To:  07/05/22    Referring Provider:  Alba Wisdom    Initial Assessment        See Epic Evaluation SOC Date: 04/26/22

## 2022-04-30 LAB
CMV DNA SPEC NAA+PROBE-ACNC: NOT DETECTED IU/ML
Lab: NORMAL
MAYO MISC RESULT: NORMAL
PERFORMING LABORATORY: NORMAL
SPECIMEN STATUS: NORMAL
TEST NAME: NORMAL

## 2022-05-01 LAB
BACTERIA BRONCH: NORMAL
GALACTOMANNAN AG BAL QL: NEGATIVE
GALACTOMANNAN AG SPEC IA-ACNC: 0.14

## 2022-05-04 ENCOUNTER — APPOINTMENT (OUTPATIENT)
Dept: LAB | Facility: CLINIC | Age: 62
End: 2022-05-04
Attending: PHYSICIAN ASSISTANT
Payer: COMMERCIAL

## 2022-05-04 ENCOUNTER — ONCOLOGY VISIT (OUTPATIENT)
Dept: TRANSPLANT | Facility: CLINIC | Age: 62
End: 2022-05-04
Attending: PHYSICIAN ASSISTANT
Payer: COMMERCIAL

## 2022-05-04 ENCOUNTER — HOME INFUSION (PRE-WILLOW HOME INFUSION) (OUTPATIENT)
Dept: PHARMACY | Facility: CLINIC | Age: 62
End: 2022-05-04

## 2022-05-04 ENCOUNTER — THERAPY VISIT (OUTPATIENT)
Dept: PHYSICAL THERAPY | Facility: CLINIC | Age: 62
End: 2022-05-04
Attending: PHYSICIAN ASSISTANT
Payer: COMMERCIAL

## 2022-05-04 VITALS
HEART RATE: 79 BPM | SYSTOLIC BLOOD PRESSURE: 135 MMHG | DIASTOLIC BLOOD PRESSURE: 81 MMHG | WEIGHT: 227 LBS | OXYGEN SATURATION: 97 % | BODY MASS INDEX: 29.95 KG/M2 | RESPIRATION RATE: 16 BRPM

## 2022-05-04 DIAGNOSIS — Z94.81 STATUS POST BONE MARROW TRANSPLANT (H): ICD-10-CM

## 2022-05-04 DIAGNOSIS — C91.01 ACUTE LYMPHOBLASTIC LEUKEMIA (ALL) IN REMISSION (H): Primary | ICD-10-CM

## 2022-05-04 DIAGNOSIS — M62.81 GENERALIZED MUSCLE WEAKNESS: Primary | ICD-10-CM

## 2022-05-04 DIAGNOSIS — B27.00 EPSTEIN-BARR VIRUS VIREMIA: ICD-10-CM

## 2022-05-04 LAB
ALBUMIN SERPL-MCNC: 2.8 G/DL (ref 3.4–5)
ALP SERPL-CCNC: 162 U/L (ref 40–150)
ALT SERPL W P-5'-P-CCNC: 77 U/L (ref 0–70)
ANION GAP SERPL CALCULATED.3IONS-SCNC: 7 MMOL/L (ref 3–14)
AST SERPL W P-5'-P-CCNC: 67 U/L (ref 0–45)
BASOPHILS # BLD AUTO: 0 10E3/UL (ref 0–0.2)
BASOPHILS NFR BLD AUTO: 0 %
BILIRUB SERPL-MCNC: 0.6 MG/DL (ref 0.2–1.3)
BUN SERPL-MCNC: 22 MG/DL (ref 7–30)
CALCIUM SERPL-MCNC: 9.1 MG/DL (ref 8.5–10.1)
CHLORIDE BLD-SCNC: 103 MMOL/L (ref 94–109)
CMV DNA SPEC NAA+PROBE-ACNC: NOT DETECTED IU/ML
CO2 SERPL-SCNC: 29 MMOL/L (ref 20–32)
CREAT SERPL-MCNC: 0.84 MG/DL (ref 0.66–1.25)
EOSINOPHIL # BLD AUTO: 0 10E3/UL (ref 0–0.7)
EOSINOPHIL NFR BLD AUTO: 0 %
ERYTHROCYTE [DISTWIDTH] IN BLOOD BY AUTOMATED COUNT: 21.4 % (ref 10–15)
GFR SERPL CREATININE-BSD FRML MDRD: >90 ML/MIN/1.73M2
GLUCOSE BLD-MCNC: 79 MG/DL (ref 70–99)
HCT VFR BLD AUTO: 42.9 % (ref 40–53)
HGB BLD-MCNC: 14.6 G/DL (ref 13.3–17.7)
IMM GRANULOCYTES # BLD: 0.1 10E3/UL
IMM GRANULOCYTES NFR BLD: 1 %
LYMPHOCYTES # BLD AUTO: 2.2 10E3/UL (ref 0.8–5.3)
LYMPHOCYTES NFR BLD AUTO: 34 %
MAGNESIUM SERPL-MCNC: 1.6 MG/DL (ref 1.6–2.3)
MCH RBC QN AUTO: 31 PG (ref 26.5–33)
MCHC RBC AUTO-ENTMCNC: 34 G/DL (ref 31.5–36.5)
MCV RBC AUTO: 91 FL (ref 78–100)
MONOCYTES # BLD AUTO: 0.3 10E3/UL (ref 0–1.3)
MONOCYTES NFR BLD AUTO: 5 %
NEUTROPHILS # BLD AUTO: 3.7 10E3/UL (ref 1.6–8.3)
NEUTROPHILS NFR BLD AUTO: 60 %
NRBC # BLD AUTO: 0 10E3/UL
NRBC BLD AUTO-RTO: 0 /100
PLAT MORPH BLD: NORMAL
PLATELET # BLD AUTO: 84 10E3/UL (ref 150–450)
POTASSIUM BLD-SCNC: 4.1 MMOL/L (ref 3.4–5.3)
PROT SERPL-MCNC: 6.6 G/DL (ref 6.8–8.8)
RBC # BLD AUTO: 4.71 10E6/UL (ref 4.4–5.9)
RBC MORPH BLD: NORMAL
SODIUM SERPL-SCNC: 139 MMOL/L (ref 133–144)
WBC # BLD AUTO: 6.3 10E3/UL (ref 4–11)

## 2022-05-04 PROCEDURE — 97110 THERAPEUTIC EXERCISES: CPT | Mod: GP | Performed by: PHYSICAL THERAPIST

## 2022-05-04 PROCEDURE — 83735 ASSAY OF MAGNESIUM: CPT

## 2022-05-04 PROCEDURE — 36591 DRAW BLOOD OFF VENOUS DEVICE: CPT

## 2022-05-04 PROCEDURE — 80053 COMPREHEN METABOLIC PANEL: CPT

## 2022-05-04 PROCEDURE — 96365 THER/PROPH/DIAG IV INF INIT: CPT

## 2022-05-04 PROCEDURE — 250N000013 HC RX MED GY IP 250 OP 250 PS 637: Performed by: INTERNAL MEDICINE

## 2022-05-04 PROCEDURE — 250N000011 HC RX IP 250 OP 636: Performed by: PHYSICIAN ASSISTANT

## 2022-05-04 PROCEDURE — 250N000011 HC RX IP 250 OP 636: Performed by: INTERNAL MEDICINE

## 2022-05-04 PROCEDURE — 99215 OFFICE O/P EST HI 40 MIN: CPT

## 2022-05-04 PROCEDURE — 258N000003 HC RX IP 258 OP 636: Performed by: INTERNAL MEDICINE

## 2022-05-04 PROCEDURE — 96366 THER/PROPH/DIAG IV INF ADDON: CPT

## 2022-05-04 PROCEDURE — 87799 DETECT AGENT NOS DNA QUANT: CPT

## 2022-05-04 PROCEDURE — 85025 COMPLETE CBC W/AUTO DIFF WBC: CPT

## 2022-05-04 PROCEDURE — G0463 HOSPITAL OUTPT CLINIC VISIT: HCPCS

## 2022-05-04 RX ORDER — ACETAMINOPHEN 325 MG/1
650 TABLET ORAL ONCE
Status: COMPLETED | OUTPATIENT
Start: 2022-05-04 | End: 2022-05-04

## 2022-05-04 RX ORDER — HEPARIN SODIUM (PORCINE) LOCK FLUSH IV SOLN 100 UNIT/ML 100 UNIT/ML
5 SOLUTION INTRAVENOUS ONCE
Status: COMPLETED | OUTPATIENT
Start: 2022-05-04 | End: 2022-05-04

## 2022-05-04 RX ORDER — CYCLOSPORINE 0.5 MG/ML
1 EMULSION OPHTHALMIC 2 TIMES DAILY
Qty: 30 EACH | Refills: 1 | Status: SHIPPED | OUTPATIENT
Start: 2022-05-04 | End: 2022-05-26

## 2022-05-04 RX ORDER — DIPHENHYDRAMINE HCL 25 MG
50 CAPSULE ORAL ONCE
Status: COMPLETED | OUTPATIENT
Start: 2022-05-04 | End: 2022-05-04

## 2022-05-04 RX ADMIN — RITUXIMAB-ABBS 900 MG: 10 INJECTION, SOLUTION INTRAVENOUS at 11:02

## 2022-05-04 RX ADMIN — SODIUM CHLORIDE, PRESERVATIVE FREE 5 ML: 5 INJECTION INTRAVENOUS at 12:35

## 2022-05-04 RX ADMIN — SODIUM CHLORIDE, PRESERVATIVE FREE 5 ML: 5 INJECTION INTRAVENOUS at 09:47

## 2022-05-04 RX ADMIN — DIPHENHYDRAMINE HYDROCHLORIDE 50 MG: 25 CAPSULE ORAL at 10:41

## 2022-05-04 RX ADMIN — ACETAMINOPHEN 650 MG: 325 TABLET ORAL at 10:41

## 2022-05-04 ASSESSMENT — PAIN SCALES - GENERAL: PAINLEVEL: NO PAIN (0)

## 2022-05-04 NOTE — PROGRESS NOTES
Infusion Nursing Note:  Nik Nava presents today for Cycle 1, Day 8 Rituxan for EBV.    Patient seen by provider today: Yes: Layla Spencer   present during visit today: Not Applicable.    Note: Labs were monitored.  No lab parameters for today's treatment.  Patient assessment was completed by Provider.      Intravenous Access:  Implanted Port.  Positive blood flow was noted pre and post Rituxan infusion.    Treatment Conditions:  Patient received his second dose of Rituxan for EBV.      Post Infusion Assessment:  Patient tolerated infusion without incident.       Discharge Plan:   Patient discharged in stable condition accompanied by: wife.      CORINE HANSEN RN                    felipe

## 2022-05-04 NOTE — NURSING NOTE
"Chief Complaint   Patient presents with     Port Draw     Labs drawn via port by RN. VS taken.     Oncology Clinic Visit     ALL in remission     Port needle and dressing changed.    Port accessed with 20g 3/4\" power needle and labs drawn by rn.  Port flushed with NS and heparin.  Pt tolerated well.  VS taken.  Pt checked in for next appt.    Cornelio White RN  "

## 2022-05-04 NOTE — PROGRESS NOTES
This is a recent snapshot of the patient's [facility] medical record.  For current drug dose and complete information and questions, call [phone] or In Basket pool, fv home infusion ([id number])  CSN Number:  [id number]

## 2022-05-04 NOTE — NURSING NOTE
"Oncology Rooming Note    May 4, 2022 9:47 AM   Nik Nava is a 62 year old male who presents for:    Chief Complaint   Patient presents with     Port Draw     Labs drawn via port by RN. VS taken.     Oncology Clinic Visit     ALL in remission     Initial Vitals: /81   Pulse 79   Resp 16   Wt 103 kg (227 lb)   SpO2 97%   BMI 29.95 kg/m   Estimated body mass index is 29.95 kg/m  as calculated from the following:    Height as of 4/29/22: 1.854 m (6' 1\").    Weight as of this encounter: 103 kg (227 lb). Body surface area is 2.3 meters squared.  No Pain (0) Comment: Data Unavailable   No LMP for male patient.  Allergies reviewed: Yes  Medications reviewed: Yes    Medications: Medication refills not needed today.  Pharmacy name entered into [employer]:    [facility] - [place], [place] - [phone] [facility] [place], [place] - [phone]    Clinical concerns: would like to get off the depression meds    Continue infusion?    Praveen Hilliard            "

## 2022-05-04 NOTE — PROGRESS NOTES
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 Continued fatigue.  Stable foot drop, working with PT.  Still with some stable drool, no new or worsening changes.  No fevers.  Coming out of the shower Monday he had a red lump on his left calf. Never painful. Went to  and obtained US which was negative for DVT. No muscle cramping at that site. No swelling, redness or pain today.  Eyes are dry, gritty, bothersome. Would like to try restasis. No mouth sores. No n/v/d. No rashes.       Review of Systems                                                                                                                                         12 point Review of systems was o/w negative     PHYSICAL EXAM                                                                                                                                                 KPS: 80  /81   Pulse 79   Resp 16   Wt 103 kg (227 lb)   SpO2 97%   BMI 29.95 kg/m      Wt Readings from Last 4 Encounters:   05/04/22 103 kg (227 lb)   04/29/22 102.8 kg (226 lb 10.1 oz)   04/28/22 101.2 kg (223 lb)   04/28/22 101.4 kg (223 lb 8 oz)      General: NAD.  HEENT: sclera anicteric. Eyes mildly injected  No lichenoid changes or oral ulcers noted on buccal mucosa, inner lips or palate; minimal erythema posterior buccal adjacent to uppre molars.  Lungs:  CTA b/l   no wheezes  CV: RRR  no gallop  Abd; soft no tenderness; BS nl   Ext/ Skin: Hyperpigmentation noted on torso-- all healing/freckled color, full resolution of very mild erythema of midback.   LE 1+ edema has on stockings. Left calf wnl  Full ROM UE and LE; full ROM wrists, fingers, ankles  Neuro;  All CN intact; palate and face symmetric.    cGVHD therapy started on February 24 2022  - Baseline NIH scoring 2/24/2022 : skin 2 maculopapular rash/erythema with no  sclerotic features, mouth score 1 mild symptoms with lichenoid changes less than 25%, eyes score 2 moderate symptoms without new visual impairments due to KCS requiring lubricant eyedrops more than 3 times a day, overall mild scale for her provider  - 3/25/2022 1 month [facility] treatment assessment on PQRST: skin 2, mouth score 1 mild symptoms with NO lichenoid changes, eyes score 2 moderate symptoms w requiring lubricant eyedrops more than 3 times a day, liver score 1 ALT 3.7x ULN and nl bilirubin   - 3/31  Mouth clear;  eyes minimal LFT still abn; no joint or new GI sx  - 4/28 Mouth clear, Left>R eye is mild sclera erythema, lids are pink and irritated appearing, LFT's slow improvement today, no new joint, skin, or GI symptoms.     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  Acute lymphoblastic leukemia, [place] chromosome positive in CR, MRD neg. S/p allo sib PBSCT. (ABO matched)  HCT-CI score: 3 (prior solid tumor)  Day +100 bone marrow biopsy is 100% donor with no morphologic or flow cytometric evidence of leukemia BCR abl is pending.  - Day +180 restaging BM bx- still in CR  100% donor;  2/16 BCR ABL major breakpoint undetectable.     2.  HEME: Counts are stable currently with pred taper and starting valcyte.   - Transfuse for hgb <7g/dL; plt < 10k.  No transfusions needs  - Pulmonary emboli: He developed a pulmonary emboli in the setting of receiving PEG asparaginase (ie provoked thrombus).  Xarelto complete.   - Counts are tolerating valcyte well.     3.  FEN/Renal:  - Cr stable  - He has a history of renal cancer and resection. Has a single kidney.    4.    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   #Skin GVHD- history of biopsy proven GVHD of the skin 8/30/2021; resolved.   # Liver cGVHD biopsy  proven 2/21/2022: LFTs are overall improving despite pred taper.   # Ocular GVHD: follows with ophthalmology. Maxitrol to lids, continue refreshing drops QID. Just seen by optho eyes look good. F/U 1yr. If eye symptoms not improving low threshold to message Dr Menezes to ask if restasis is a good option for him.   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   - Cont tac to 1mg BID. Level 2.1. Discussed with Dr Hill-- Continue tac at his dose and no need for levels currently. Do not increase dose. But no need switch to sirolimus. (keep tac low as gvhd is quiet and viremia).      3/1-3/16: prednisone 100mg every day  3/17 75mg every day   3/31 75 alt with 50  4/6: 75 alt with 25mg every other day  4/12 pred tapered to 60 alternating with 25mg   4/15 In setting of viruses trying to reactivate,GVHD stable: Taper 60/15mg alternating.  - 5/2 Pred decrease 40/0 alternating every other day.   - Plan to decrease to 30/0, then 20/0 slowly then discuss with Dr Hill if taper should be slowed at that point.        5.  ID: Afebrile   Otitis media 3/1: s/p levaquin.  Sudafed to dry up fluid behind ear.-- Balance issues- seem to be improving as steroids taper-- monitor.      PPx: ACV, micafungin M/W/F. Pentamidine given 4/16 4/16 Give azithro 250mg daily- continue (stopped levaquin due to possible tendonitis).     EBV viremia: 66k 4/25 prior to EBV,  4/20 CAP CT (w/ contrast): No adenopathy.   - 4/22 rituximab 375mg/m2  x1.   - After rituximab x1, 4/28 EBV pending today. As long as remains lower EBV(<50k) would not give additional rituximab doses.   - 4/20 CT chest with new RUL infiltrate. Highly immunocompromised.   - 4/22 Fungitell/asp GM were neg   - Per Radha/ Sergio discussion increased IV micafungin MWF to daily. Will f/u Dr Garcia recommendation.     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 4/15 start valcyte 900mg PO BID. 4/20 CMV <137, last NEGATIVE.  Decreased to 450po BID 4/30 - continue 4 weeks     S/p  covid vaccine series 12/2021  S/p anuradha (next possible dose 9/2022 6 mo interval between doses)    6. Endo: Hx of graves disease; On synthroid 175 mcg daily. TSH normal today no reflex to T4.     7. CV: Norvasc: 2.5mg.    8. GI:   Omeprazole for heartburn  LFTs as above-- improving.     9. Psych:   - Situational anxiety - lexapro 10mg daily.   - Insomnia: worse on steroids. Ativan, trazadone, melatonin, discussed dose optimization. Recommend to trial skipping trazadone on 0 pred days or okay to decrease to 25mg daily. Also encouraged ativan is not a good long term sleep solution.       Plan: rituxan given today   Start restasis  Taper pred 30/0 Monday.     RTC 5/13 Avila Tobar, sooner for any new or worsening sx       I spent 40 minutes in the care of this patient today, which included time necessary for preparation for the visit, obtaining history, ordering medications/tests/procedures as medically indicated, review of pertinent medical literature, counseling of the patient, communication of recommendations to the care team, and documentation time.    Layla Spencer PAC  [phone]

## 2022-05-04 NOTE — LETTER
5/4/2022         RE: Nik Nava  [address] [address] [address]        Dear Colleague,    Thank you for referring your patient, Nik Nava, to the [facility]LOOD AND MARROW TRANSPLANT PROGRAM [place]. Please see a copy of my visit note below.    Infusion Nursing Note:  Nik Nava presents today for Cycle 1, Day 8 Rituxan for EBV.    Patient seen by provider today: Yes: Layla Spencer   present during visit today: Not Applicable.    Note: Labs were monitored.  No lab parameters for today's treatment.  Patient assessment was completed by Provider.      Intravenous Access:  Implanted Port.  Positive blood flow was noted pre and post Rituxan infusion.    Treatment Conditions:  Patient received his second dose of Rituxan for EBV.      Post Infusion Assessment:  Patient tolerated infusion without incident.       Discharge Plan:   Patient discharged in stable condition accompanied by: wife.      CORINE HANSEN RN                    felipe      Again, thank you for allowing me to participate in the care of your patient.        Sincerely,        [facility]

## 2022-05-04 NOTE — LETTER
5/4/2022         RE: Nik Nava  [address] [address] [address]        Dear Colleague,    Thank you for referring your patient, Nik Nava, to the [facility] BLOOD AND MARROW TRANSPLANT PROGRAM [place]. Please see a copy of my visit note below.      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 Continued fatigue.  Stable foot drop, working with PT.  Still with some stable drool, no new or worsening changes.  No fevers.  Coming out of the shower Monday he had a red lump on his left calf. Never painful. Went to  and obtained US which was negative for DVT. No muscle cramping at that site. No swelling, redness or pain today.  Eyes are dry, gritty, bothersome. Would like to try restasis. No mouth sores. No n/v/d. No rashes.       Review of Systems                                                                                                                                         12 point Review of systems was o/w negative     PHYSICAL EXAM                                                                                                                                                 KPS: 80  /81   Pulse 79   Resp 16   Wt 103 kg (227 lb)   SpO2 97%   BMI 29.95 kg/m      Wt Readings from Last 4 Encounters:   05/04/22 103 kg (227 lb)   04/29/22 102.8 kg (226 lb 10.1 oz)   04/28/22 101.2 kg (223 lb)   04/28/22 101.4 kg (223 lb 8 oz)      General: NAD.  HEENT: sclera anicteric. Eyes mildly injected  No lichenoid changes or oral ulcers noted on buccal mucosa, inner lips or palate; minimal erythema posterior buccal adjacent to uppre molars.  Lungs:  CTA b/l   no wheezes  CV: RRR  no gallop  Abd; soft no tenderness; BS nl   Ext/ Skin: Hyperpigmentation noted on torso-- all healing/freckled color, full resolution of very mild erythema of  midback.   LE 1+ edema has on stockings. Left calf wnl  Full ROM UE and LE; full ROM wrists, fingers, ankles  Neuro;  All CN intact; palate and face symmetric.    cGVHD therapy started on February 24 2022  - Baseline NIH scoring 2/24/2022 : skin 2 maculopapular rash/erythema with no sclerotic features, mouth score 1 mild symptoms with lichenoid changes less than 25%, eyes score 2 moderate symptoms without new visual impairments due to KCS requiring lubricant eyedrops more than 3 times a day, overall mild scale for her provider  - 3/25/2022 1 month NIH treatment assessment on PQRST: skin 2, mouth score 1 mild symptoms with NO lichenoid changes, eyes score 2 moderate symptoms w requiring lubricant eyedrops more than 3 times a day, liver score 1 ALT 3.7x ULN and nl bilirubin   - 3/31  Mouth clear;  eyes minimal LFT still abn; no joint or new GI sx  - 4/28 Mouth clear, Left>R eye is mild sclera erythema, lids are pink and irritated appearing, LFT's slow improvement today, no new joint, skin, or GI symptoms.     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  Acute lymphoblastic leukemia, [place] chromosome positive in CR, MRD neg. S/p allo sib PBSCT. (ABO matched)  HCT-CI score: 3 (prior solid tumor)  Day +100 bone marrow biopsy is 100% donor with no morphologic or flow cytometric evidence of leukemia BCR abl is pending.  - Day +180 restaging BM bx- still in CR  100% donor;  2/16 BCR ABL major breakpoint undetectable.     2.  HEME: Counts are stable currently with pred taper and starting valcyte.   - Transfuse for hgb <7g/dL; plt < 10k.  No transfusions needs  - Pulmonary emboli: He developed a pulmonary emboli in the setting of receiving PEG asparaginase (ie provoked thrombus).  Xarelto complete.   - Counts are tolerating valcyte  well.     3.  FEN/Renal:  - Cr stable  - He has a history of renal cancer and resection. Has a single kidney.    4.    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   #Skin GVHD- history of biopsy proven GVHD of the skin 8/30/2021; resolved.   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   # Ocular GVHD: follows with ophthalmology. Maxitrol to lids, continue refreshing drops QID. Just seen by optho eyes look good. F/U 1yr. If eye symptoms not improving low threshold to message Dr Menezes to ask if restasis is a good option for him.   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   - Cont tac to 1mg BID. Level 2.1. Discussed with Dr Hill-- Continue tac at his dose and no need for levels currently. Do not increase dose. But no need switch to sirolimus. (keep tac low as gvhd is quiet and viremia).      3/1-3/16: prednisone 100mg every day  3/17 75mg every day   3/31 75 alt with 50  4/6: 75 alt with 25mg every other day  4/12 pred tapered to 60 alternating with 25mg   4/15 In setting of viruses trying to reactivate,GVHD stable: Taper 60/15mg alternating.  - 5/2 Pred decrease 40/0 alternating every other day.   - Plan to decrease to 30/0, then 20/0 slowly then discuss with Dr Hill if taper should be slowed at that point.        5.  ID: Afebrile   Otitis media 3/1: s/p levaquin.  Sudafed to dry up fluid behind ear.-- Balance issues- seem to be improving as steroids taper-- monitor.      PPx: ACV, micafungin M/W/F. Pentamidine given 4/16 4/16 Give azithro 250mg daily- continue (stopped levaquin due to possible tendonitis).     EBV viremia: 66k 4/25 prior to EBV,  4/20 CAP CT (w/ contrast): No adenopathy.   - 4/22 rituximab 375mg/m2  x1.   - After rituximab x1, 4/28 EBV pending today. As long as remains lower EBV(<50k) would not give additional rituximab doses.   - 4/20 CT chest with new RUL infiltrate. Highly immunocompromised.    - 4/22 Fungitell/asp GM were neg   - Per Radha/ Sergio discussion increased IV micafungin MWF to daily. Will f/u Dr Garcia recommendation.     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 4/15 start valcyte 900mg PO BID. 4/20 CMV <137, last NEGATIVE.  Decreased to 450po BID 4/30 - continue 4 weeks     S/p covid vaccine series 12/2021  S/p evusheld (next possible dose 9/2022 6 mo interval between doses)    6. Endo: Hx of graves disease; On synthroid 175 mcg daily. TSH normal today no reflex to T4.     7. CV: Norvasc: 2.5mg.    8. GI:   Omeprazole for heartburn  LFTs as above-- improving.     9. Psych:   - Situational anxiety - lexapro 10mg daily.   - Insomnia: worse on steroids. Ativan, trazadone, melatonin, discussed dose optimization. Recommend to trial skipping trazadone on 0 pred days or okay to decrease to 25mg daily. Also encouraged ativan is not a good long term sleep solution.       Plan: rituxan given today   Start restasis  Taper pred 30/0 Monday.     RTC 5/13 Avila Tobar, sooner for any new or worsening sx       I spent 40 minutes in the care of this patient today, which included time necessary for preparation for the visit, obtaining history, ordering medications/tests/procedures as medically indicated, review of pertinent medical literature, counseling of the patient, communication of recommendations to the care team, and documentation time.    Layla Spencer PAC  [phone]          Again, thank you for allowing me to participate in the care of your patient.      Sincerely,    BMT Advanced Practice Provider

## 2022-05-05 LAB
EBV DNA # SPEC NAA+PROBE: NOT DETECTED COPIES/ML
SCANNED LAB RESULT: NORMAL

## 2022-05-06 NOTE — PROGRESS NOTES
This is a recent snapshot of the patient's [facility] medical record.  For current drug dose and complete information and questions, call [phone] or In Basket pool, fv home infusion ([id number])  CSN Number:  [id number]

## 2022-05-07 ENCOUNTER — NURSE TRIAGE (OUTPATIENT)
Dept: NURSING | Facility: CLINIC | Age: 62
End: 2022-05-07
Payer: COMMERCIAL

## 2022-05-07 ENCOUNTER — TELEPHONE (OUTPATIENT)
Dept: ONCOLOGY | Facility: CLINIC | Age: 62
End: 2022-05-07
Payer: COMMERCIAL

## 2022-05-07 DIAGNOSIS — U07.1 INFECTION DUE TO 2019 NOVEL CORONAVIRUS: Primary | ICD-10-CM

## 2022-05-07 NOTE — TELEPHONE ENCOUNTER
Paged about positive covid test,      Patient is post transplant day +267, she has been having symptoms since 5/5/2022, chills , sore throat,  dry cough with nausea and vomitinng. He is post transplant, immunosuppresed and has mild to moderate symptoms, with an indication to treat with Paxlovid.      Creatinine is normal but  is on Tacrolimus. We will monitor his labs closely and get a tac level early next week due to drug-drug interactions.      Take 2 tablet twice a day for 5 days .      Patient's wife was updated about the plan. Labs on Mon/tue (tac level)     Plan was also discussed with .      Ryan Gibson MD  Hematology/Oncology/Transplant Fellow   [facility]   Pgr :

## 2022-05-07 NOTE — TELEPHONE ENCOUNTER
[facility] calling that pt prescribed Paxlovid today sig; 2 tablets twice daily for 5 days = 20 tablets, however Rx states dispense 30.  Pharmacist calling to state that 20 tablets were dispensed - verified with Pharmacist both sig and MD note state 20 tablets.  Stacey Russo RN  [facility] Nurse Advisor

## 2022-05-08 ENCOUNTER — HEALTH MAINTENANCE LETTER (OUTPATIENT)
Age: 62
End: 2022-05-08

## 2022-05-08 ENCOUNTER — HOME INFUSION (PRE-WILLOW HOME INFUSION) (OUTPATIENT)
Dept: PHARMACY | Facility: CLINIC | Age: 62
End: 2022-05-08

## 2022-05-09 ENCOUNTER — CARE COORDINATION (OUTPATIENT)
Dept: TRANSPLANT | Facility: CLINIC | Age: 62
End: 2022-05-09
Payer: COMMERCIAL

## 2022-05-09 ENCOUNTER — HOME INFUSION (PRE-WILLOW HOME INFUSION) (OUTPATIENT)
Dept: PHARMACY | Facility: CLINIC | Age: 62
End: 2022-05-09

## 2022-05-09 ENCOUNTER — TELEPHONE (OUTPATIENT)
Dept: TRANSPLANT | Facility: CLINIC | Age: 62
End: 2022-05-09
Payer: COMMERCIAL

## 2022-05-09 DIAGNOSIS — C91.01 ACUTE LYMPHOBLASTIC LEUKEMIA (ALL) IN REMISSION (H): Primary | ICD-10-CM

## 2022-05-09 DIAGNOSIS — Z94.81 STATUS POST BONE MARROW TRANSPLANT (H): ICD-10-CM

## 2022-05-09 NOTE — TELEPHONE ENCOUNTER
Patient is post transplant day +272, He has a recent diagnosis of covid 10.  Since he is post transplant, immunosuppresed and has mild to moderate symptoms, there is an indication to treat with Paxlovid.     Given the interaction w/ tac, he will get a tac level today or tomorrow w/ planned BMT f/u on Friday.    I spoke w/ pt's wife and NC.  If able, he will get a tac level at [facility] or if not, the lab at the [facility].    He is doing ok.  No SOB; No fever.  Feels run down.  Mild dyspepsia.    Lashanda Figueroa pa-c  [phone]

## 2022-05-09 NOTE — TELEPHONE ENCOUNTER
Pt's wife called stating pt experiencing symptoms, tested positive over the weekend for COVID-19. I informed pt that I will page an GIRISH to give Lamar a call @ [phone] and if pt does not get a call back within an hour to call [phone] again. Pt agreed. GIRISH paged.

## 2022-05-09 NOTE — PROGRESS NOTES
I received a call from the GIRISH that pt needs a tacrolimus level drawn today or tomorrow, he would like to go to  [facility]. I was unable to reach pt, but left a message  For him. I also sent a message to [employer]  to schedule and call pt today

## 2022-05-10 ENCOUNTER — HOME INFUSION (PRE-WILLOW HOME INFUSION) (OUTPATIENT)
Dept: PHARMACY | Facility: CLINIC | Age: 62
End: 2022-05-10

## 2022-05-10 ENCOUNTER — LAB (OUTPATIENT)
Dept: ONCOLOGY | Facility: CLINIC | Age: 62
End: 2022-05-10
Payer: COMMERCIAL

## 2022-05-10 ENCOUNTER — TELEPHONE (OUTPATIENT)
Dept: ONCOLOGY | Facility: CLINIC | Age: 62
End: 2022-05-10

## 2022-05-10 DIAGNOSIS — C91.01 ACUTE LYMPHOBLASTIC LEUKEMIA (ALL) IN REMISSION (H): ICD-10-CM

## 2022-05-10 DIAGNOSIS — Z94.81 STATUS POST BONE MARROW TRANSPLANT (H): ICD-10-CM

## 2022-05-10 LAB
ALBUMIN SERPL-MCNC: 2.9 G/DL (ref 3.4–5)
ALP SERPL-CCNC: 151 U/L (ref 40–150)
ALT SERPL W P-5'-P-CCNC: 59 U/L (ref 0–70)
ANION GAP SERPL CALCULATED.3IONS-SCNC: 8 MMOL/L (ref 3–14)
AST SERPL W P-5'-P-CCNC: ABNORMAL U/L
BASOPHILS # BLD AUTO: 0 10E3/UL (ref 0–0.2)
BASOPHILS NFR BLD AUTO: 0 %
BILIRUB SERPL-MCNC: 0.6 MG/DL (ref 0.2–1.3)
BUN SERPL-MCNC: 36 MG/DL (ref 7–30)
CALCIUM SERPL-MCNC: 9.7 MG/DL (ref 8.5–10.1)
CHLORIDE BLD-SCNC: 107 MMOL/L (ref 94–109)
CO2 SERPL-SCNC: 23 MMOL/L (ref 20–32)
CREAT SERPL-MCNC: 0.86 MG/DL (ref 0.66–1.25)
EOSINOPHIL # BLD AUTO: 0 10E3/UL (ref 0–0.7)
EOSINOPHIL NFR BLD AUTO: 0 %
ERYTHROCYTE [DISTWIDTH] IN BLOOD BY AUTOMATED COUNT: 21.4 % (ref 10–15)
GFR SERPL CREATININE-BSD FRML MDRD: >90 ML/MIN/1.73M2
GLUCOSE BLD-MCNC: 91 MG/DL (ref 70–99)
HCT VFR BLD AUTO: 41.9 % (ref 40–53)
HGB BLD-MCNC: 14.6 G/DL (ref 13.3–17.7)
IMM GRANULOCYTES # BLD: 0.1 10E3/UL
IMM GRANULOCYTES NFR BLD: 1 %
LYMPHOCYTES # BLD AUTO: 3.6 10E3/UL (ref 0.8–5.3)
LYMPHOCYTES NFR BLD AUTO: 36 %
MCH RBC QN AUTO: 31.6 PG (ref 26.5–33)
MCHC RBC AUTO-ENTMCNC: 34.8 G/DL (ref 31.5–36.5)
MCV RBC AUTO: 91 FL (ref 78–100)
MONOCYTES # BLD AUTO: 1.2 10E3/UL (ref 0–1.3)
MONOCYTES NFR BLD AUTO: 12 %
NEUTROPHILS # BLD AUTO: 5.3 10E3/UL (ref 1.6–8.3)
NEUTROPHILS NFR BLD AUTO: 51 %
NRBC # BLD AUTO: 0 10E3/UL
NRBC BLD AUTO-RTO: 0 /100
PLATELET # BLD AUTO: 128 10E3/UL (ref 150–450)
POTASSIUM BLD-SCNC: 4.6 MMOL/L (ref 3.4–5.3)
PROT SERPL-MCNC: 6.9 G/DL (ref 6.8–8.8)
RBC # BLD AUTO: 4.62 10E6/UL (ref 4.4–5.9)
SODIUM SERPL-SCNC: 138 MMOL/L (ref 133–144)
TACROLIMUS BLD-MCNC: 45.5 UG/L (ref 5–15)
TME LAST DOSE: ABNORMAL H
TME LAST DOSE: ABNORMAL H
WBC # BLD AUTO: 10.2 10E3/UL (ref 4–11)

## 2022-05-10 PROCEDURE — 80197 ASSAY OF TACROLIMUS: CPT

## 2022-05-10 PROCEDURE — 80048 BASIC METABOLIC PNL TOTAL CA: CPT | Performed by: INTERNAL MEDICINE

## 2022-05-10 PROCEDURE — 36591 DRAW BLOOD OFF VENOUS DEVICE: CPT

## 2022-05-10 PROCEDURE — 87799 DETECT AGENT NOS DNA QUANT: CPT | Performed by: INTERNAL MEDICINE

## 2022-05-10 PROCEDURE — 84460 ALANINE AMINO (ALT) (SGPT): CPT | Performed by: INTERNAL MEDICINE

## 2022-05-10 PROCEDURE — 82247 BILIRUBIN TOTAL: CPT | Performed by: INTERNAL MEDICINE

## 2022-05-10 PROCEDURE — 82040 ASSAY OF SERUM ALBUMIN: CPT | Performed by: INTERNAL MEDICINE

## 2022-05-10 PROCEDURE — 85025 COMPLETE CBC W/AUTO DIFF WBC: CPT | Performed by: INTERNAL MEDICINE

## 2022-05-10 PROCEDURE — 84075 ASSAY ALKALINE PHOSPHATASE: CPT | Performed by: INTERNAL MEDICINE

## 2022-05-10 PROCEDURE — 84155 ASSAY OF PROTEIN SERUM: CPT | Performed by: INTERNAL MEDICINE

## 2022-05-10 RX ORDER — HEPARIN SODIUM (PORCINE) LOCK FLUSH IV SOLN 100 UNIT/ML 100 UNIT/ML
500 SOLUTION INTRAVENOUS EVERY 8 HOURS
Status: DISCONTINUED | OUTPATIENT
Start: 2022-05-10 | End: 2022-05-10 | Stop reason: HOSPADM

## 2022-05-10 RX ADMIN — HEPARIN SODIUM (PORCINE) LOCK FLUSH IV SOLN 100 UNIT/ML 500 UNITS: 100 SOLUTION at 10:44

## 2022-05-10 NOTE — PROGRESS NOTES
Patient's name and  were verified.  See Doc Flowsheet - IV assess for details.  IVAD accessed already since pt is on home IV therapy.  blood return positive: YES  Site without redness, tenderness or swelling: YES  flushed with 20cc NS and 5cc 100u/ml heparin  Needle: left acccessed  Comments: Labs drawn, cap changed.  Patient tolerated procedure without incident.    Pt states he took 2 home COVID tests on Saturday, 22 and both were positive.  Pt c/o feeling tired and cough x 2 days.  Pt with an N95 mask on and put in private room while in clinic.    Chi Mari  RN, BSN

## 2022-05-11 ENCOUNTER — TELEPHONE (OUTPATIENT)
Dept: ONCOLOGY | Facility: CLINIC | Age: 62
End: 2022-05-11
Payer: COMMERCIAL

## 2022-05-11 DIAGNOSIS — C91.01 ACUTE LYMPHOBLASTIC LEUKEMIA (ALL) IN REMISSION (H): Primary | ICD-10-CM

## 2022-05-11 DIAGNOSIS — Z94.81 STATUS POST BONE MARROW TRANSPLANT (H): ICD-10-CM

## 2022-05-11 LAB
CMV DNA SPEC NAA+PROBE-ACNC: NOT DETECTED IU/ML
EBV DNA # SPEC NAA+PROBE: NOT DETECTED COPIES/ML

## 2022-05-11 NOTE — TELEPHONE ENCOUNTER
Pt 62 year old with hx of   bone marrow transplant Aug 2021   pt reports HA, started yesterday worsened overnight and this morning. Takes  tacrolimus 1mg BID. 5/10/22 Tacrolimus  Level 45.5   he is on paxlovid for covid treatment today is his last day,  covid symptoms improved greatly.   Blood pressure:124/73, HR 84.     Tremors from tacrolimus not worsening from baseline. Had 2x episode diarrhea yesterday and regular BM this morning.    took ES tylenol this morning at 7:00AM starting to improve with headache. Denies CP, SOB, fever, dizziness/lightheadedness    [id number] paged Dr. Hill   -Hold tacrolimus, until seen in clinic Friday.   - 5/13/2022 scheduled lab  and appt with Dr. Avila Tobar   -Complete paxlovid treatment     0752 called back pt and informed with above plan. Pt and wife voiced understanding and agreement with plan.

## 2022-05-11 NOTE — TELEPHONE ENCOUNTER
Notified by lab that Tacrolimus level resulted at 45. Mr aNva was recently started on Paxlovid for COVID19, which has a drug interaction with tacrolimus. (Ritonavir increases serum levels of tacrolimus)     I attempted to call Mr. Nava to discuss, but there was no answer on his cell phone x2 and his home phone x1    I will route this message to the clinic team so that someone can reach out to him in the morning.    Jovita Ramos MD  PGY6 Fellow. Hematology/Oncology/Transplantation

## 2022-05-13 ENCOUNTER — APPOINTMENT (OUTPATIENT)
Dept: LAB | Facility: CLINIC | Age: 62
End: 2022-05-13
Attending: INTERNAL MEDICINE
Payer: COMMERCIAL

## 2022-05-13 ENCOUNTER — ONCOLOGY VISIT (OUTPATIENT)
Dept: TRANSPLANT | Facility: CLINIC | Age: 62
End: 2022-05-13
Attending: INTERNAL MEDICINE
Payer: COMMERCIAL

## 2022-05-13 ENCOUNTER — HOME INFUSION (PRE-WILLOW HOME INFUSION) (OUTPATIENT)
Dept: PHARMACY | Facility: CLINIC | Age: 62
End: 2022-05-13

## 2022-05-13 VITALS
HEART RATE: 82 BPM | OXYGEN SATURATION: 97 % | RESPIRATION RATE: 18 BRPM | TEMPERATURE: 98.1 F | SYSTOLIC BLOOD PRESSURE: 144 MMHG | DIASTOLIC BLOOD PRESSURE: 92 MMHG

## 2022-05-13 DIAGNOSIS — Z94.81 STATUS POST BONE MARROW TRANSPLANT (H): Primary | ICD-10-CM

## 2022-05-13 DIAGNOSIS — Z94.81 STATUS POST BONE MARROW TRANSPLANT (H): ICD-10-CM

## 2022-05-13 DIAGNOSIS — C91.01 ACUTE LYMPHOBLASTIC LEUKEMIA (ALL) IN REMISSION (H): ICD-10-CM

## 2022-05-13 LAB
ALBUMIN SERPL-MCNC: 3.2 G/DL (ref 3.4–5)
ALP SERPL-CCNC: 161 U/L (ref 40–150)
ALT SERPL W P-5'-P-CCNC: 61 U/L (ref 0–70)
ANION GAP SERPL CALCULATED.3IONS-SCNC: 9 MMOL/L (ref 3–14)
AST SERPL W P-5'-P-CCNC: 53 U/L (ref 0–45)
BASOPHILS # BLD AUTO: 0 10E3/UL (ref 0–0.2)
BASOPHILS NFR BLD AUTO: 1 %
BILIRUB SERPL-MCNC: 0.5 MG/DL (ref 0.2–1.3)
BUN SERPL-MCNC: 41 MG/DL (ref 7–30)
CALCIUM SERPL-MCNC: 10 MG/DL (ref 8.5–10.1)
CHLORIDE BLD-SCNC: 107 MMOL/L (ref 94–109)
CO2 SERPL-SCNC: 24 MMOL/L (ref 20–32)
CREAT SERPL-MCNC: 1.25 MG/DL (ref 0.66–1.25)
EOSINOPHIL # BLD AUTO: 0 10E3/UL (ref 0–0.7)
EOSINOPHIL NFR BLD AUTO: 0 %
ERYTHROCYTE [DISTWIDTH] IN BLOOD BY AUTOMATED COUNT: 21.7 % (ref 10–15)
GFR SERPL CREATININE-BSD FRML MDRD: 64 ML/MIN/1.73M2
GLUCOSE BLD-MCNC: 112 MG/DL (ref 70–99)
HCT VFR BLD AUTO: 43.3 % (ref 40–53)
HGB BLD-MCNC: 15.2 G/DL (ref 13.3–17.7)
IMM GRANULOCYTES # BLD: 0.2 10E3/UL
IMM GRANULOCYTES NFR BLD: 3 %
LYMPHOCYTES # BLD AUTO: 1.9 10E3/UL (ref 0.8–5.3)
LYMPHOCYTES NFR BLD AUTO: 30 %
MAGNESIUM SERPL-MCNC: 1.3 MG/DL (ref 1.6–2.3)
MCH RBC QN AUTO: 32.1 PG (ref 26.5–33)
MCHC RBC AUTO-ENTMCNC: 35.1 G/DL (ref 31.5–36.5)
MCV RBC AUTO: 92 FL (ref 78–100)
MONOCYTES # BLD AUTO: 0.5 10E3/UL (ref 0–1.3)
MONOCYTES NFR BLD AUTO: 8 %
NEUTROPHILS # BLD AUTO: 3.6 10E3/UL (ref 1.6–8.3)
NEUTROPHILS NFR BLD AUTO: 58 %
NRBC # BLD AUTO: 0 10E3/UL
NRBC BLD AUTO-RTO: 1 /100
PLATELET # BLD AUTO: 154 10E3/UL (ref 150–450)
POTASSIUM BLD-SCNC: 4.8 MMOL/L (ref 3.4–5.3)
PROT SERPL-MCNC: 6.8 G/DL (ref 6.8–8.8)
RBC # BLD AUTO: 4.73 10E6/UL (ref 4.4–5.9)
SODIUM SERPL-SCNC: 140 MMOL/L (ref 133–144)
WBC # BLD AUTO: 6.1 10E3/UL (ref 4–11)

## 2022-05-13 PROCEDURE — G0463 HOSPITAL OUTPT CLINIC VISIT: HCPCS

## 2022-05-13 PROCEDURE — 94642 AEROSOL INHALATION TREATMENT: CPT | Performed by: INTERNAL MEDICINE

## 2022-05-13 PROCEDURE — 36591 DRAW BLOOD OFF VENOUS DEVICE: CPT | Performed by: INTERNAL MEDICINE

## 2022-05-13 PROCEDURE — 83735 ASSAY OF MAGNESIUM: CPT | Performed by: INTERNAL MEDICINE

## 2022-05-13 PROCEDURE — 80053 COMPREHEN METABOLIC PANEL: CPT | Performed by: INTERNAL MEDICINE

## 2022-05-13 PROCEDURE — 94640 AIRWAY INHALATION TREATMENT: CPT | Performed by: INTERNAL MEDICINE

## 2022-05-13 PROCEDURE — 99215 OFFICE O/P EST HI 40 MIN: CPT | Performed by: INTERNAL MEDICINE

## 2022-05-13 PROCEDURE — 80197 ASSAY OF TACROLIMUS: CPT | Performed by: INTERNAL MEDICINE

## 2022-05-13 PROCEDURE — 85025 COMPLETE CBC W/AUTO DIFF WBC: CPT | Performed by: INTERNAL MEDICINE

## 2022-05-13 RX ORDER — PENTAMIDINE ISETHIONATE 300 MG/300MG
300 INHALANT RESPIRATORY (INHALATION)
Status: DISCONTINUED | OUTPATIENT
Start: 2022-05-13 | End: 2022-05-13 | Stop reason: HOSPADM

## 2022-05-13 RX ORDER — ALBUTEROL SULFATE 5 MG/ML
2.5 SOLUTION RESPIRATORY (INHALATION) EVERY 6 HOURS PRN
Status: CANCELLED
Start: 2022-06-07

## 2022-05-13 RX ORDER — HEPARIN SODIUM (PORCINE) LOCK FLUSH IV SOLN 100 UNIT/ML 100 UNIT/ML
5 SOLUTION INTRAVENOUS
Status: CANCELLED | OUTPATIENT
Start: 2022-06-07

## 2022-05-13 RX ORDER — PENTAMIDINE ISETHIONATE 300 MG/300MG
300 INHALANT RESPIRATORY (INHALATION)
Status: CANCELLED
Start: 2022-06-07

## 2022-05-13 RX ORDER — HEPARIN SODIUM,PORCINE 10 UNIT/ML
5 VIAL (ML) INTRAVENOUS
Status: CANCELLED | OUTPATIENT
Start: 2022-06-07

## 2022-05-13 RX ORDER — ALBUTEROL SULFATE 5 MG/ML
2.5 SOLUTION RESPIRATORY (INHALATION) EVERY 6 HOURS PRN
Status: DISCONTINUED | OUTPATIENT
Start: 2022-05-13 | End: 2022-05-13 | Stop reason: HOSPADM

## 2022-05-13 RX ADMIN — PENTAMIDINE ISETHIONATE 300 MG: 300 INHALANT RESPIRATORY (INHALATION) at 12:17

## 2022-05-13 NOTE — PROGRESS NOTES
Patient was seen today for a Pentamidine nebulizer tx ordered by Dr. Avila Tobar    Patient was first given 2.5 mg of Albuterol via  Nebulizer (NDC [id number]  Lot #[id number]  Expires 12/2023), after which Pentamidine 300 mg (Lot # [id number]) mixed with 6cc Sterile H20 was administered through a filtered nebulizer.    Pre-treatment: SpO2 = 97%   HR = 83   BBS = Clear  Post-treatment: SpO2 = 97%  HR = 86 BBS = Clear    No adverse side effects noted by the patient.    This service today was provided under the direct supervision of Dr. Gonzalez , who was available if needed.     Procedure was completed by Aisha Monson. JUNIOR

## 2022-05-13 NOTE — NURSING NOTE
"Oncology Rooming Note    May 13, 2022 10:47 AM   Nik Nava is a 62 year old male who presents for:    No chief complaint on file.    Initial Vitals: There were no vitals taken for this visit. Estimated body mass index is 29.95 kg/m  as calculated from the following:    Height as of 4/29/22: 1.854 m (6' 1\").    Weight as of 5/4/22: 103 kg (227 lb). There is no height or weight on file to calculate BSA.  Data Unavailable Comment: Data Unavailable   No LMP for male patient.  Allergies reviewed: Yes  Medications reviewed: Yes    Medications: Medication refills not needed today.  Pharmacy name entered into [employer]:    [facility] - [place], [place] - [address] [facility] [place], [place] - [address] [facility] [phone]    Clinical concerns: when can he start being around people       Catherine Mahan RN              "

## 2022-05-13 NOTE — PROGRESS NOTES
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  He reports overall doing well review of system is positive for fatigue due to recent COVID he did have diarrhea with oral treatment for COVID that treatment was completed 2 days ago and diarrhea is now resolved.  He reports no oral ulcers or other concerns with oral GVHD he is only using the dexamethasone swish and spit as needed once or twice a day and sometimes none.  He has excellent appetite and food does not get stuck in his mouth like it did before.  He is using refresh eyedrops 4-6 times per day.  Restasis caused burning and that was subsequently stopped, he is not using the neomycin ointment.  He denies any nausea vomiting or diarrhea.  Denies any recurrence of the rash or skin itching. Numbness and tingling on top of the foot to calf.  Left foot drop is ongoing for weeks without worsening no other neurologic symptoms, was evaluated by PT who did not recommend a brace today we will follow-up with PT next week.  No worsening back pain or localizing symptoms necessitating imaging at this time.  Him and his wife are overall pleased with their progress after eventful couple of months.    Review of Systems                                                                                                                                         12 point Review of systems was o/w negative     PHYSICAL EXAM                                                                                                                                                 KPS: 80  There were no vitals taken for this visit.    Wt Readings from Last 4 Encounters:   05/04/22 103 kg (227 lb)   04/29/22 102.8 kg (226 lb 10.1 oz)   04/28/22 101.2 kg (223 lb)   04/28/22 101.4 kg (223 lb 8 oz)      General:  NAD.  HEENT: sclera anicteric.  No lichenoid changes or oral ulcers noted on buccal mucosa, inner lips or palate; minimal erythema posterior buccal adjacent to uppre molars.  Lungs:  CTA b/l   no wheezes  CV: RRR  no gallop  Abd; soft no tenderness; BS nl   Ext/ Skin: Hyperpigmentation noted on torso-- all healing/freckled color, full resolution of very mild erythema of midback.   LE 1+ edema has on stockings.  Full ROM UE and LE; full ROM wrists, fingers, ankles  Neuro;  All CN intact; palate and face symmetric.    cGVHD therapy started on February 24 2022  - Baseline NIH scoring 2/24/2022 : skin 2 maculopapular rash/erythema with no sclerotic features, mouth score 1 mild symptoms with lichenoid changes less than 25%, eyes score 2 moderate symptoms without new visual impairments due to KCS requiring lubricant eyedrops more than 3 times a day, overall mild scale for her provider  - 3/25/2022 1 month NIH treatment assessment on PQRST: skin 2, mouth score 1 mild symptoms with NO lichenoid changes, eyes score 2 moderate symptoms w requiring lubricant eyedrops more than 3 times a day, liver score 1 ALT 3.7x ULN and nl bilirubin   - 3/31  Mouth clear; eyes minimal LFT still abn; no joint or new GI sx  - 4/28 Mouth clear, Left>R eye is mild sclera erythema, lids are pink and irritated appearing, LFT's slow improvement, no new joint, skin, or GI symptoms.   -5/11 mouth with mild erythema but no lichenoid changes, eyes using eyedrops 4-6 times a day score of 2, LFTs significantly improved from baseline slight elevation in alk phos and AST with normal bilirubin, skin with hyperpigmentation from old acute GVHD no active skin rashes, no GI symptoms no musculoskeletal complaints.    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 S/p allo sib PBSCT. (ABO matched)  HCT-CI score: 3 (prior solid tumor)  Day +100 bone marrow biopsy is 100% donor with no morphologic or flow cytometric evidence of leukemia BCR abl is pending.  - Day +180 restaging BM bx- still in CR  100% donor;  2/16/22 BCR ABL major breakpoint undetectable.     2.  HEME: Counts are stable currently with pred taper and starting valcyte. 5/11 for the first time in months-  Likely indicates HD control at this time.  No need eosinophilia.  - Transfuse for hgb <7g/dL; plt < 10k.  No transfusions needs  - Pulmonary emboli: He developed a pulmonary emboli in the setting of receiving PEG asparaginase (ie provoked thrombus).  Xarelto complete.   - Counts are tolerating valcyte well.     3.  FEN/Renal:  - Cr up today likely due to supratx tacro but WNL, encouraged hydration  - Hypomag again due to tacro increase Mag to 2 BID, recheck Wed  - He has a history of renal cancer and resection. Has a single kidney.    4.    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   #Skin GVHD- history of biopsy proven GVHD of the skin 8/30/2021; resolved.   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   # Ocular GVHD: follows with ophthalmology. Maxitrol to lids, continue refreshing drops QID. Just seen by optho eyes look good. F/U 1yr. If eye symptoms not improving message Dr Menezes to ask if restasis is a good option for him.   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   - Cont tac to 1mg BID. previously decided to stay on same dose, no checks of levels if clinically stable, and no need switch to sirolimus. (keep tac low as gvhd is quiet and viremia). 5/10 level 45 with starting of COVID therapy and D-D intractions (Paxlovid for COVID19, which has a drug interaction with tacrolimus-Ritonavir increases serum levels of tacrolimus), tacro  held, pending level today 5/13     3/1-3/16: prednisone 100mg every day  3/17 75mg every day   3/31 75 alt with 50  4/6: 75 alt with 25mg every other day  4/12 pred tapered to 60 alternating with 25mg   4/15 In setting of viruses trying to reactivate,GVHD stable: Taper 60/15mg alternating.  4/20 decrease pred further to 50/10.    4/25 taper pred to 50/0 every other day as long as symptoms stable.   5/2 Pred decrease 40/0 alternating every other day.   5/13 decrease to 30/0, then 20/0 then slowly by 5 mg weekly      5.  ID: Afebrile   - Otitis media 3/1: s/p levaquin.  Sudafed to dry up fluid behind ear. Balance issues- seem to be improving as steroids taper-- monitor.    - PPx: ACV, micafungin M/W/F. Pentamidine given 4/16/22 and 5/13/2022. 4/16 switched to azithro 250mg daily (stopped levaquin due to possible tendonitis).   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 S/p 4/22 and 5/4/22 rituximab 375mg/m2 5/10 ND x2, monitor monthly   - 4/20 CT chest with new RUL infiltrate. Highly immunocompromised. 4/22 Fungitell/asp GM were neg   --- Seen Dr Garcia 4/28 s/p BAL 4/29 NGTD, increased micafungin to 150mg IV daily-- f/u Dr Garcia 5/24/2022.   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 4/15 startws valcyte 900mg PO BID. 4/20 CMV <137, 5/10 ND, decreased to 450mg BID 4/30 - continue 4 weeks as long as CMV <137 till 5/28 + weekly CMV  S/p covid vaccine series 12/2021  S/p evusheld    6. Endo: Hx of graves disease; On synthroid 175 mcg daily. TSH normal 4/6/2022 no reflex to T4.     7. CV: Norvasc: 2.5mg.    8. GI:   Omeprazole for heartburn  LFTs as above-- improving.     9. Psych:   - Situational anxiety - lexapro 10mg daily.   - Insomnia: worse on steroids. Ativan, trazadone, melatonin, discussed dose optimization. Recommend to trial skipping trazadone on 0 pred days or okay to decrease to 25mg daily. Also encouraged ativan is not a good long term sleep solution.     10. MSK: left food drop, PT    RTC:   Weekly  visits  5/13 pentamidine  Increase mag to 2 BID  Cont bev 150mg daily  Taper pred 30/0   Valcyte 450mg PO BID 4/30 - 5/28 then ppx   Eyes most active cGVHD organ currently, messaged ophthalmo reg restasis intolerance and encouraged restarting maxitriol eye drops   RTC next Wednesday with GIRISH and prior labs, CMV check  RTC with me on 5/26 as DOM        I spent 45 minutes in the care of this patient today, which included time necessary for preparation for the visit, obtaining history, ordering medications/tests/procedures as medically indicated, review of pertinent medical literature, counseling of the patient, communication of recommendations to the care team, and documentation time.

## 2022-05-13 NOTE — LETTER
5/13/2022         RE: Nik Nava  [id number] [facility] [id number]        Dear Colleague,    Thank you for referring your patient, Nik Nava, to the [facility] [place]. Please see a copy of my visit note below.    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  He reports overall doing well review of system is positive for fatigue due to recent COVID he did have diarrhea with oral treatment for COVID that treatment was completed 2 days ago and diarrhea is now resolved.  He reports no oral ulcers or other concerns with oral GVHD he is only using the dexamethasone swish and spit as needed once or twice a day and sometimes none.  He has excellent appetite and food does not get stuck in his mouth like it did before.  He is using refresh eyedrops 4-6 times per day.  Restasis caused burning and that was subsequently stopped, he is not using the neomycin ointment.  He denies any nausea vomiting or diarrhea.  Denies any recurrence of the rash or skin itching. Numbness and tingling on top of the foot to calf.  Left foot drop is ongoing for weeks without worsening no other neurologic symptoms, was evaluated by PT who did not recommend a brace today we will follow-up with PT next week.  No worsening back pain or localizing symptoms necessitating imaging at this time.  Him and his wife are overall pleased with their progress after eventful couple of months.    Review of Systems                                                                                                                                         12 point Review of systems was o/w negative     PHYSICAL EXAM                                                                                                                                                  KPS: 80  There were no vitals taken for this visit.    Wt Readings from Last 4 Encounters:   05/04/22 103 kg (227 lb)   04/29/22 102.8 kg (226 lb 10.1 oz)   04/28/22 101.2 kg (223 lb)   04/28/22 101.4 kg (223 lb 8 oz)      General: NAD.  HEENT: sclera anicteric.  No lichenoid changes or oral ulcers noted on buccal mucosa, inner lips or palate; minimal erythema posterior buccal adjacent to uppre molars.  Lungs:  CTA b/l   no wheezes  CV: RRR  no gallop  Abd; soft no tenderness; BS nl   Ext/ Skin: Hyperpigmentation noted on torso-- all healing/freckled color, full resolution of very mild erythema of midback.   LE 1+ edema has on stockings.  Full ROM UE and LE; full ROM wrists, fingers, ankles  Neuro;  All CN intact; palate and face symmetric.    cGVHD therapy started on February 24 2022  - Baseline NIH scoring 2/24/2022 : skin 2 maculopapular rash/erythema with no sclerotic features, mouth score 1 mild symptoms with lichenoid changes less than 25%, eyes score 2 moderate symptoms without new visual impairments due to KCS requiring lubricant eyedrops more than 3 times a day, overall mild scale for her provider  - 3/25/2022 1 month NIH treatment assessment on PQRST: skin 2, mouth score 1 mild symptoms with NO lichenoid changes, eyes score 2 moderate symptoms w requiring lubricant eyedrops more than 3 times a day, liver score 1 ALT 3.7x ULN and nl bilirubin   - 3/31  Mouth clear; eyes minimal LFT still abn; no joint or new GI sx  - 4/28 Mouth clear, Left>R eye is mild sclera erythema, lids are pink and irritated appearing, LFT's slow improvement, no new joint, skin, or GI symptoms.   -5/11 mouth with mild erythema but no lichenoid changes, eyes using eyedrops 4-6 times a day score of 2, LFTs significantly improved from baseline slight elevation in alk phos and AST with normal bilirubin, skin with hyperpigmentation from old acute GVHD no active skin rashes, no GI symptoms  no musculoskeletal complaints.    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 S/p allo sib PBSCT. (ABO matched)  HCT-CI score: 3 (prior solid tumor)  Day +100 bone marrow biopsy is 100% donor with no morphologic or flow cytometric evidence of leukemia BCR abl is pending.  - Day +180 restaging BM bx- still in CR  100% donor;  2/16/22 BCR ABL major breakpoint undetectable.     2.  HEME: Counts are stable currently with pred taper and starting valcyte. 5/11 for the first time in months-  Likely indicates HD control at this time.  No need eosinophilia.  - Transfuse for hgb <7g/dL; plt < 10k.  No transfusions needs  - Pulmonary emboli: He developed a pulmonary emboli in the setting of receiving PEG asparaginase (ie provoked thrombus).  Xarelto complete.   - Counts are tolerating valcyte well.     3.  FEN/Renal:  - Cr up today likely due to supratx tacro but WNL, encouraged hydration  - Hypomag again due to tacro increase Mag to 2 BID, recheck Wed  - He has a history of renal cancer and resection. Has a single kidney.    4.    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   #Skin GVHD- history of biopsy proven GVHD of the skin 8/30/2021; resolved.   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   # Ocular GVHD: follows with ophthalmology. Maxitrol to lids, continue refreshing drops QID. Just seen by optho eyes look good. F/U 1yr. If eye symptoms not improving message Dr Menezes to ask if restasis is a good option for him.   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   - Cont tac to 1mg BID. previously decided to stay on same dose, no checks of levels if clinically  stable, and no need switch to sirolimus. (keep tac low as gvhd is quiet and viremia). 5/10 level 45 with starting of COVID therapy and D-D intractions (Paxlovid for COVID19, which has a drug interaction with tacrolimus-Ritonavir increases serum levels of tacrolimus), tacro held, pending level today 5/13     3/1-3/16: prednisone 100mg every day  3/17 75mg every day   3/31 75 alt with 50  4/6: 75 alt with 25mg every other day  4/12 pred tapered to 60 alternating with 25mg   4/15 In setting of viruses trying to reactivate,GVHD stable: Taper 60/15mg alternating.  4/20 decrease pred further to 50/10.    4/25 taper pred to 50/0 every other day as long as symptoms stable.   5/2 Pred decrease 40/0 alternating every other day.   5/13 decrease to 30/0, then 20/0 then slowly by 5 mg weekly    5.  ID: Afebrile   - Otitis media 3/1: s/p levaquin.  Sudafed to dry up fluid behind ear. Balance issues- seem to be improving as steroids taper-- monitor.    - PPx: ACV, micafungin M/W/F. Pentamidine given 4/16/22 and 5/13/2022. 4/16 switched to azithro 250mg daily (stopped levaquin due to possible tendonitis).   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 S/p 4/22 and 5/4/22 rituximab 375mg/m2 5/10 ND x2, monitor monthly   - 4/20 CT chest with new RUL infiltrate. Highly immunocompromised. 4/22 Fungitell/asp GM were neg   --- Seen Dr Garcia 4/28 s/p BAL 4/29 NGTD, increased micafungin to 150mg IV daily-- f/u Dr Garcia 5/24/2022.   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 4/15 startws valcyte 900mg PO BID. 4/20 CMV <137, 5/10 ND, decreased to 450mg BID 4/30 - continue 4 weeks as long as CMV <137 till 5/28 + weekly CMV  S/p covid vaccine series 12/2021  S/p evusheld    6. Endo: Hx of graves disease; On synthroid 175 mcg daily. TSH normal 4/6/2022 no reflex to T4.     7. CV: Norvasc: 2.5mg.    8. GI:   Omeprazole for heartburn  LFTs as above-- improving.     9. Psych:   - Situational anxiety - lexapro 10mg daily.   - Insomnia: worse on  steroids. Ativan, trazadone, melatonin, discussed dose optimization. Recommend to trial skipping trazadone on 0 pred days or okay to decrease to 25mg daily. Also encouraged ativan is not a good long term sleep solution.     10. MSK: left food drop, PT    RTC:   Weekly visits  5/13 pentamidine  Increase mag to 2 BID  Cont bev 150mg daily  Taper pred 30/0   Valcyte 450mg PO BID 4/30 - 5/28 then ppx   Eyes most active cGVHD organ currently, messaged ophthalmo reg restasis intolerance and encouraged restarting maxitriol eye drops   RTC next Wednesday with GIRISH and prior labs, CMV check  RTC with me on 5/26 as DOM      I spent 45 minutes in the care of this patient today, which included time necessary for preparation for the visit, obtaining history, ordering medications/tests/procedures as medically indicated, review of pertinent medical literature, counseling of the patient, communication of recommendations to the care team, and documentation time.    Sincerely,  Akshat Tobar MD

## 2022-05-13 NOTE — PATIENT INSTRUCTIONS
Valcyte 450mg twice a day till 5/28     Decrease prednisone 30 mg alternating with 0 mg    Contuinue same micafungin dose    Increase magnesium to 2 tablets twice a day

## 2022-05-14 LAB
TACROLIMUS BLD-MCNC: 44.8 UG/L (ref 5–15)
TME LAST DOSE: ABNORMAL H
TME LAST DOSE: ABNORMAL H

## 2022-05-16 ENCOUNTER — TELEPHONE (OUTPATIENT)
Dept: OPTOMETRY | Facility: CLINIC | Age: 62
End: 2022-05-16

## 2022-05-17 ENCOUNTER — THERAPY VISIT (OUTPATIENT)
Dept: PHYSICAL THERAPY | Facility: CLINIC | Age: 62
End: 2022-05-17
Payer: COMMERCIAL

## 2022-05-17 ENCOUNTER — TELEPHONE (OUTPATIENT)
Dept: OPHTHALMOLOGY | Facility: CLINIC | Age: 62
End: 2022-05-17

## 2022-05-17 DIAGNOSIS — M62.81 GENERALIZED MUSCLE WEAKNESS: Primary | ICD-10-CM

## 2022-05-17 PROCEDURE — 97112 NEUROMUSCULAR REEDUCATION: CPT | Mod: GP | Performed by: PHYSICAL THERAPIST

## 2022-05-17 PROCEDURE — 97110 THERAPEUTIC EXERCISES: CPT | Mod: GP | Performed by: PHYSICAL THERAPIST

## 2022-05-17 NOTE — TELEPHONE ENCOUNTER
LVM regarding scheduling a return appt with Dr Juarez for: Re-evaluate treatment plan. Left direct number for scheduling.

## 2022-05-18 ENCOUNTER — ONCOLOGY VISIT (OUTPATIENT)
Dept: TRANSPLANT | Facility: CLINIC | Age: 62
End: 2022-05-18
Attending: PHYSICIAN ASSISTANT
Payer: COMMERCIAL

## 2022-05-18 ENCOUNTER — APPOINTMENT (OUTPATIENT)
Dept: LAB | Facility: CLINIC | Age: 62
End: 2022-05-18
Attending: PHYSICIAN ASSISTANT
Payer: COMMERCIAL

## 2022-05-18 ENCOUNTER — TELEPHONE (OUTPATIENT)
Dept: OPHTHALMOLOGY | Facility: CLINIC | Age: 62
End: 2022-05-18
Payer: COMMERCIAL

## 2022-05-18 VITALS
OXYGEN SATURATION: 98 % | HEART RATE: 88 BPM | TEMPERATURE: 98.2 F | DIASTOLIC BLOOD PRESSURE: 80 MMHG | SYSTOLIC BLOOD PRESSURE: 132 MMHG | RESPIRATION RATE: 18 BRPM

## 2022-05-18 DIAGNOSIS — Z94.81 STATUS POST BONE MARROW TRANSPLANT (H): ICD-10-CM

## 2022-05-18 LAB
ALBUMIN SERPL-MCNC: 3 G/DL (ref 3.4–5)
ALP SERPL-CCNC: 143 U/L (ref 40–150)
ALT SERPL W P-5'-P-CCNC: 50 U/L (ref 0–70)
ANION GAP SERPL CALCULATED.3IONS-SCNC: 5 MMOL/L (ref 3–14)
AST SERPL W P-5'-P-CCNC: 46 U/L (ref 0–45)
BASOPHILS # BLD AUTO: 0 10E3/UL (ref 0–0.2)
BASOPHILS NFR BLD AUTO: 1 %
BILIRUB SERPL-MCNC: 0.6 MG/DL (ref 0.2–1.3)
BUN SERPL-MCNC: 36 MG/DL (ref 7–30)
C PNEUM DNA SPEC QL NAA+PROBE: NOT DETECTED
CALCIUM SERPL-MCNC: 9.8 MG/DL (ref 8.5–10.1)
CHLORIDE BLD-SCNC: 106 MMOL/L (ref 94–109)
CO2 SERPL-SCNC: 27 MMOL/L (ref 20–32)
CREAT SERPL-MCNC: 0.96 MG/DL (ref 0.66–1.25)
EOSINOPHIL # BLD AUTO: 0 10E3/UL (ref 0–0.7)
EOSINOPHIL NFR BLD AUTO: 0 %
ERYTHROCYTE [DISTWIDTH] IN BLOOD BY AUTOMATED COUNT: 21.3 % (ref 10–15)
FLUAV H1 2009 PAND RNA SPEC QL NAA+PROBE: NOT DETECTED
FLUAV H1 RNA SPEC QL NAA+PROBE: NOT DETECTED
FLUAV H3 RNA SPEC QL NAA+PROBE: NOT DETECTED
FLUAV RNA SPEC QL NAA+PROBE: NOT DETECTED
FLUBV RNA SPEC QL NAA+PROBE: NOT DETECTED
GFR SERPL CREATININE-BSD FRML MDRD: 88 ML/MIN/1.73M2
GLUCOSE BLD-MCNC: 95 MG/DL (ref 70–99)
HADV DNA SPEC QL NAA+PROBE: NOT DETECTED
HCOV PNL SPEC NAA+PROBE: NOT DETECTED
HCT VFR BLD AUTO: 42.3 % (ref 40–53)
HGB BLD-MCNC: 14.6 G/DL (ref 13.3–17.7)
HMPV RNA SPEC QL NAA+PROBE: NOT DETECTED
HPIV1 RNA SPEC QL NAA+PROBE: NOT DETECTED
HPIV2 RNA SPEC QL NAA+PROBE: NOT DETECTED
HPIV3 RNA SPEC QL NAA+PROBE: NOT DETECTED
HPIV4 RNA SPEC QL NAA+PROBE: NOT DETECTED
IMM GRANULOCYTES # BLD: 0.1 10E3/UL
IMM GRANULOCYTES NFR BLD: 2 %
LYMPHOCYTES # BLD AUTO: 2 10E3/UL (ref 0.8–5.3)
LYMPHOCYTES NFR BLD AUTO: 31 %
M PNEUMO DNA SPEC QL NAA+PROBE: NOT DETECTED
MAGNESIUM SERPL-MCNC: 1.3 MG/DL (ref 1.6–2.3)
MCH RBC QN AUTO: 31.7 PG (ref 26.5–33)
MCHC RBC AUTO-ENTMCNC: 34.5 G/DL (ref 31.5–36.5)
MCV RBC AUTO: 92 FL (ref 78–100)
MONOCYTES # BLD AUTO: 0.7 10E3/UL (ref 0–1.3)
MONOCYTES NFR BLD AUTO: 11 %
NEUTROPHILS # BLD AUTO: 3.6 10E3/UL (ref 1.6–8.3)
NEUTROPHILS NFR BLD AUTO: 55 %
NRBC # BLD AUTO: 0 10E3/UL
NRBC BLD AUTO-RTO: 0 /100
PLATELET # BLD AUTO: 174 10E3/UL (ref 150–450)
POTASSIUM BLD-SCNC: 4.4 MMOL/L (ref 3.4–5.3)
PROT SERPL-MCNC: 6.8 G/DL (ref 6.8–8.8)
RBC # BLD AUTO: 4.61 10E6/UL (ref 4.4–5.9)
RSV RNA SPEC QL NAA+PROBE: NOT DETECTED
RSV RNA SPEC QL NAA+PROBE: NOT DETECTED
RV+EV RNA SPEC QL NAA+PROBE: NOT DETECTED
SARS-COV-2 RNA RESP QL NAA+PROBE: POSITIVE
SODIUM SERPL-SCNC: 138 MMOL/L (ref 133–144)
TACROLIMUS BLD-MCNC: 6.1 UG/L (ref 5–15)
TME LAST DOSE: NORMAL H
TME LAST DOSE: NORMAL H
WBC # BLD AUTO: 6.5 10E3/UL (ref 4–11)

## 2022-05-18 PROCEDURE — 80053 COMPREHEN METABOLIC PANEL: CPT

## 2022-05-18 PROCEDURE — G0463 HOSPITAL OUTPT CLINIC VISIT: HCPCS

## 2022-05-18 PROCEDURE — 36591 DRAW BLOOD OFF VENOUS DEVICE: CPT

## 2022-05-18 PROCEDURE — 85025 COMPLETE CBC W/AUTO DIFF WBC: CPT

## 2022-05-18 PROCEDURE — 99215 OFFICE O/P EST HI 40 MIN: CPT

## 2022-05-18 PROCEDURE — 87486 CHLMYD PNEUM DNA AMP PROBE: CPT

## 2022-05-18 PROCEDURE — 80197 ASSAY OF TACROLIMUS: CPT

## 2022-05-18 PROCEDURE — 83735 ASSAY OF MAGNESIUM: CPT

## 2022-05-18 PROCEDURE — U0003 INFECTIOUS AGENT DETECTION BY NUCLEIC ACID (DNA OR RNA); SEVERE ACUTE RESPIRATORY SYNDROME CORONAVIRUS 2 (SARS-COV-2) (CORONAVIRUS DISEASE [COVID-19]), AMPLIFIED PROBE TECHNIQUE, MAKING USE OF HIGH THROUGHPUT TECHNOLOGIES AS DESCRIBED BY CMS-2020-01-R: HCPCS

## 2022-05-18 ASSESSMENT — PAIN SCALES - GENERAL: PAINLEVEL: NO PAIN (0)

## 2022-05-18 NOTE — NURSING NOTE
"Oncology Rooming Note    May 18, 2022 11:15 AM   Nik Nava is a 62 year old male who presents for:    Chief Complaint   Patient presents with     Oncology Clinic Visit     Provider visit; hx ALL s/p BMT     Initial Vitals: /80 (BP Location: Right arm, Patient Position: Sitting)   Pulse 88   Temp 98.2  F (36.8  C) (Oral)   Resp 18   SpO2 98%  Estimated body mass index is 29.95 kg/m  as calculated from the following:    Height as of 4/29/22: 1.854 m (6' 1\").    Weight as of 5/4/22: 103 kg (227 lb). There is no height or weight on file to calculate BSA.  No Pain (0) Comment: Data Unavailable   No LMP for male patient.  Allergies reviewed: Yes  Medications reviewed: Yes    Medications: Medication refills not needed today.  Pharmacy name entered into [employer]:    [facility] - [place], [place] - [address] [place], [place] - [address] [facility] [phone]    Clinical concerns: Pt had positive covid test at home 12 days ago. Pt reporting cough and runny nose. No fevers.       Ghislaine Collier RN              "

## 2022-05-18 NOTE — PROGRESS NOTES
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    HPI: Returns for follow up, his wife Lamar joins by phone. Tapering prednisone and tolerating well. No flares of GVHD including skin rashes, abdominal pain, n/v/d. Eye symptoms still bothersome but improving with resumption of night creams. Still with some toxicities from elevated tac levels. Mostly tremors/shaky but also with occasional headaches. Headaches are not severe and are not associated with speech difficulties or coordination issues. Follows with PT for foot drop.     COVID symptoms had previously completely resolved after treatment with Paxlovid however in the last few days has had recurrence of rhinorrhea and cough, mostly in the mornings. UREÑA/SOB is minimal and not worsening. No fevers or chills.     Review of Systems                                                                                                                                         8 point Review of systems was o/w negative     PHYSICAL EXAM                                                                                                                                                 KPS: 80  /80 (BP Location: Right arm, Patient Position: Sitting)   Pulse 88   Temp 98.2  F (36.8  C) (Oral)   Resp 18   SpO2 98%     Wt Readings from Last 4 Encounters:   05/04/22 103 kg (227 lb)   04/29/22 102.8 kg (226 lb 10.1 oz)   04/28/22 101.2 kg (223 lb)   04/28/22 101.4 kg (223 lb 8 oz)      General: NAD.  HEENT: sclera anicteric. Masked  Lungs:  CTA b/l   no wheezes  CV: RRR  no gallop  Abd; soft no tenderness; BS nl   Ext/ Skin: Hyperpigmentation noted on torso-- all healing/freckled color, full resolution of very mild erythema of midback.   LE 1+ edema has on stockings.  Neuro;  All CN intact; palate and face symmetric.    cGVHD therapy started on February 24 2022  - Baseline NIH scoring  2/24/2022 : skin 2 maculopapular rash/erythema with no sclerotic features, mouth score 1 mild symptoms with lichenoid changes less than 25%, eyes score 2 moderate symptoms without new visual impairments due to KCS requiring lubricant eyedrops more than 3 times a day, overall mild scale for her provider  - 3/25/2022 1 month NIH treatment assessment on PQRST: skin 2, mouth score 1 mild symptoms with NO lichenoid changes, eyes score 2 moderate symptoms w requiring lubricant eyedrops more than 3 times a day, liver score 1 ALT 3.7x ULN and nl bilirubin   - 3/31  Mouth clear; eyes minimal LFT still abn; no joint or new GI sx  - 4/28 Mouth clear, Left>R eye is mild sclera erythema, lids are pink and irritated appearing, LFT's slow improvement, no new joint, skin, or GI symptoms.   -5/11 mouth with mild erythema but no lichenoid changes, eyes using eyedrops 4-6 times a day score of 2, LFTs significantly improved from baseline slight elevation in alk phos and AST with normal bilirubin, skin with hyperpigmentation from old acute GVHD no active skin rashes, no GI symptoms no musculoskeletal complaints.    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 S/p allo sib PBSCT. (ABO matched)  HCT-CI score: 3 (prior solid tumor)  Day +100 bone marrow biopsy is 100% donor with no morphologic or flow cytometric evidence of leukemia BCR abl is pending.  - Day +180 restaging BM bx- still in CR  100% donor;  2/16/22 BCR ABL major breakpoint undetectable.     2.  HEME:  - Transfuse for hgb <7g/dL; plt < 10k.  No transfusions needs  - Pulmonary emboli: He developed a pulmonary emboli in the setting of receiving PEG asparaginase (ie provoked thrombus).  Xarelto complete.   - Counts  are tolerating valcyte well.     3.  FEN/Renal:  - Cr improved.   - Hypomag: on Mag to 2 BID. Mag still low likely related to elevated tac level. Unable to give IV mag d/t delay in chemistries.  - He has a history of renal cancer and resection. Has a single kidney.    4.    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   #Skin GVHD- history of biopsy proven GVHD of the skin 8/30/2021; resolved.   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   # Ocular GVHD: follows with ophthalmology. Maxitrol to lids, continue refreshing drops QID.  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   - Cont tac to 1mg BID. previously decided to stay on same dose, no checks of levels if clinically stable, and no need switch to sirolimus. (keep tac low as gvhd is quiet and viremia). 5/10 level 45 with starting of COVID therapy and D-D intractions (Paxlovid for COVID19, which has a drug interaction with tacrolimus-Ritonavir increases serum levels of tacrolimus), tacro on hold until normalize, pending level today     3/1-3/16: prednisone 100mg every day  3/17 75mg every day   3/31 75 alt with 50  4/6: 75 alt with 25mg every other day  4/12 pred tapered to 60 alternating with 25mg   4/15 In setting of viruses trying to reactivate,GVHD stable: Taper 60/15mg alternating.  4/20 decrease pred further to 50/10.    4/25 taper pred to 50/0 every other day as long as symptoms stable.   5/2 Pred decrease 40/0 alternating every other day.   5/13 decrease to 30/0,   5/20 decrease to 20/0 then slowly by 5 mg weekly      5.  ID: Afebrile   #COVID   Positive via home test 5/8. Treated with Paxlovid x 7 days with resolution of symptoms. Now with slight recurrence. Will obtain PCR to monitor cycle threshold. Will also obtain RVP to assess for other viruses that may be confounding.     #Pulmonary infiltrates:   4/20 CT chest with new RUL infiltrate. Highly  immunocompromised. 4/22 Fungitell/asp GM were neg. BAL 4/29 NGTD, increased micafungin to 150mg IV daily-- f/u Dr Garcia 5/24/2022.    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 4/15 started valcyte 900mg PO BID. 4/20 CMV <137, 5/10 ND, decreased to 450mg BID 4/30 - continue 4 weeks as long as CMV <137 till 5/28 + weekly CMV      - PPx: ACV, micafungin 150mg daily. Pentamidine given  5/13/2022. azithro 250mg daily (stopped levaquin due to possible tendonitis).   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 S/p 4/22 and 5/4/22 rituximab 375mg/m2 5/10 ND x2, monitor monthly       S/p covid vaccine series 12/2021  S/p evusheld    6. Endo: Hx of graves disease; On synthroid 175 mcg daily. TSH normal 4/6/2022 no reflex to T4.     7. CV: Norvasc: 2.5mg.    8. GI:   Omeprazole for heartburn  LFTs as above-- improving.     9. Psych:   - Situational anxiety - lexapro 10mg daily.   - Insomnia: worse on steroids. Ativan, trazadone, melatonin    10. MSK: left food drop, PT    Plan:  Decrease prednisone 5/20 to 20/0. RVP and COVID.      RTC with Dr. Avila Tobar on 5/26 as QUINTON        I spent 45 minutes in the care of this patient today, which included time necessary for preparation for the visit, obtaining history, ordering medications/tests/procedures as medically indicated, review of pertinent medical literature, counseling of the patient, communication of recommendations to the care team, and documentation time.    Socrates De La Torre PA-C  [phone]

## 2022-05-18 NOTE — LETTER
5/18/2022         RE: Nik Nava  [id number] [id number]        Dear Colleague,    Thank you for referring your patient, Nik Nava, to the [facility] [place]. Please see a copy of my visit note below.      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    HPI: Returns for follow up, his wife Lamar joins by phone. Tapering prednisone and tolerating well. No flares of GVHD including skin rashes, abdominal pain, n/v/d. Eye symptoms still bothersome but improving with resumption of night creams. Still with some toxicities from elevated tac levels. Mostly tremors/shaky but also with occasional headaches. Headaches are not severe and are not associated with speech difficulties or coordination issues. Follows with PT for foot drop.     COVID symptoms had previously completely resolved after treatment with Paxlovid however in the last few days has had recurrence of rhinorrhea and cough, mostly in the mornings. UREÑA/SOB is minimal and not worsening. No fevers or chills.     Review of Systems                                                                                                                                         8 point Review of systems was o/w negative     PHYSICAL EXAM                                                                                                                                                 KPS: 80  /80 (BP Location: Right arm, Patient Position: Sitting)   Pulse 88   Temp 98.2  F (36.8  C) (Oral)   Resp 18   SpO2 98%     Wt Readings from Last 4 Encounters:   05/04/22 103 kg (227 lb)   04/29/22 102.8 kg (226 lb 10.1 oz)   04/28/22 101.2 kg (223 lb)   04/28/22 101.4 kg (223 lb 8 oz)      General: NAD.  HEENT: sclera anicteric. Masked  Lungs:  CTA b/l   no wheezes  CV: RRR  no gallop  Abd; soft no tenderness; BS nl    Ext/ Skin: Hyperpigmentation noted on torso-- all healing/freckled color, full resolution of very mild erythema of midback.   LE 1+ edema has on stockings.  Neuro;  All CN intact; palate and face symmetric.    cGVHD therapy started on February 24 2022  - Baseline NIH scoring 2/24/2022 : skin 2 maculopapular rash/erythema with no sclerotic features, mouth score 1 mild symptoms with lichenoid changes less than 25%, eyes score 2 moderate symptoms without new visual impairments due to KCS requiring lubricant eyedrops more than 3 times a day, overall mild scale for her provider  - 3/25/2022 1 month NIH treatment assessment on PQRST: skin 2, mouth score 1 mild symptoms with NO lichenoid changes, eyes score 2 moderate symptoms w requiring lubricant eyedrops more than 3 times a day, liver score 1 ALT 3.7x ULN and nl bilirubin   - 3/31  Mouth clear; eyes minimal LFT still abn; no joint or new GI sx  - 4/28 Mouth clear, Left>R eye is mild sclera erythema, lids are pink and irritated appearing, LFT's slow improvement, no new joint, skin, or GI symptoms.   -5/11 mouth with mild erythema but no lichenoid changes, eyes using eyedrops 4-6 times a day score of 2, LFTs significantly improved from baseline slight elevation in alk phos and AST with normal bilirubin, skin with hyperpigmentation from old acute GVHD no active skin rashes, no GI symptoms no musculoskeletal complaints.    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 S/p allo sib PBSCT. (ABO matched)  HCT-CI score: 3 (prior solid tumor)  Day +100 bone marrow biopsy is 100% donor with no morphologic or flow cytometric evidence of leukemia BCR abl is pending.  - Day +180 restaging BM bx- still in CR  100%  donor;  2/16/22 BCR ABL major breakpoint undetectable.     2.  HEME:  - Transfuse for hgb <7g/dL; plt < 10k.  No transfusions needs  - Pulmonary emboli: He developed a pulmonary emboli in the setting of receiving PEG asparaginase (ie provoked thrombus).  Xarelto complete.   - Counts are tolerating valcyte well.     3.  FEN/Renal:  - Cr improved.   - Hypomag: on Mag to 2 BID. Mag still low likely related to elevated tac level. Unable to give IV mag d/t delay in chemistries.  - He has a history of renal cancer and resection. Has a single kidney.    4.    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   #Skin GVHD- history of biopsy proven GVHD of the skin 8/30/2021; resolved.   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   # Ocular GVHD: follows with ophthalmology. Maxitrol to lids, continue refreshing drops QID.  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   - Cont tac to 1mg BID. previously decided to stay on same dose, no checks of levels if clinically stable, and no need switch to sirolimus. (keep tac low as gvhd is quiet and viremia). 5/10 level 45 with starting of COVID therapy and D-D intractions (Paxlovid for COVID19, which has a drug interaction with tacrolimus-Ritonavir increases serum levels of tacrolimus), tacro on hold until normalize, pending level today     3/1-3/16: prednisone 100mg every day  3/17 75mg every day   3/31 75 alt with 50  4/6: 75 alt with 25mg every other day  4/12 pred tapered to 60 alternating with 25mg   4/15 In setting of viruses trying to reactivate,GVHD stable: Taper 60/15mg alternating.  4/20 decrease pred further to 50/10.    4/25 taper pred to 50/0 every other day as long as symptoms stable.   5/2 Pred decrease 40/0 alternating every other day.   5/13 decrease to 30/0,   5/20 decrease to 20/0 then slowly by 5 mg weekly      5.  ID: Afebrile   #COVID   Positive via home test 5/8. Treated  with Paxlovid x 7 days with resolution of symptoms. Now with slight recurrence. Will obtain PCR to monitor cycle threshold. Will also obtain RVP to assess for other viruses that may be confounding.     #Pulmonary infiltrates:   4/20 CT chest with new RUL infiltrate. Highly immunocompromised. 4/22 Fungitell/asp GM were neg. BAL 4/29 NGTD, increased micafungin to 150mg IV daily-- f/u Dr Garcia 5/24/2022.    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 4/15 started valcyte 900mg PO BID. 4/20 CMV <137, 5/10 ND, decreased to 450mg BID 4/30 - continue 4 weeks as long as CMV <137 till 5/28 + weekly CMV      - PPx: ACV, micafungin 150mg daily. Pentamidine given  5/13/2022. azithro 250mg daily (stopped levaquin due to possible tendonitis).   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 S/p 4/22 and 5/4/22 rituximab 375mg/m2 5/10 ND x2, monitor monthly       S/p covid vaccine series 12/2021  S/p evusheld    6. Endo: Hx of graves disease; On synthroid 175 mcg daily. TSH normal 4/6/2022 no reflex to T4.     7. CV: Norvasc: 2.5mg.    8. GI:   Omeprazole for heartburn  LFTs as above-- improving.     9. Psych:   - Situational anxiety - lexapro 10mg daily.   - Insomnia: worse on steroids. Ativan, trazadone, melatonin    10. MSK: left food drop, PT    Plan:  Decrease prednisone 5/20 to 20/0. RVP and COVID.      RTC with Dr. Avila Tobar on 5/26 as DOM        I spent 45 minutes in the care of this patient today, which included time necessary for preparation for the visit, obtaining history, ordering medications/tests/procedures as medically indicated, review of pertinent medical literature, counseling of the patient, communication of recommendations to the care team, and documentation time.      Socrates De La Torre PA-C  [phone]

## 2022-05-19 ENCOUNTER — TELEPHONE (OUTPATIENT)
Dept: TRANSPLANT | Facility: CLINIC | Age: 62
End: 2022-05-19
Payer: COMMERCIAL

## 2022-05-19 ENCOUNTER — TELEPHONE (OUTPATIENT)
Dept: NURSING | Facility: CLINIC | Age: 62
End: 2022-05-19
Payer: COMMERCIAL

## 2022-05-19 LAB — CMV DNA SPEC NAA+PROBE-ACNC: NOT DETECTED IU/ML

## 2022-05-19 NOTE — TELEPHONE ENCOUNTER
Coronavirus (COVID-19) Notification    Caller Name (Patient, parent, daughter/son, grandparent, etc)  Patient    Patient already completed Rx and has a 20 day quarantine.    Reason for call  Notify of Positive Coronavirus (COVID-19) lab results, assess symptoms,  review [facility] recommendations    Lab Result    Lab test:  2019-nCoV rRt-PCR or SARS-CoV-2 PCR    Oropharyngeal AND/OR nasopharyngeal swabs is POSITIVE for 2019-nCoV RNA/SARS-COV-2 PCR (COVID-19 virus)      Gather patient reported symptoms   Assessment   Current Symptoms at time of phone call, reported by patient: (if no symptoms, document: No symptoms] Cough   Date of symptom(s) onset (if applicable) 5/4     If at time of call, Patients symptoms have worsened, the Patient should contact 911 or have someone drive them to Emergency Dept promptly:      If Patient calling 911, inform 911 personal that you have tested positive for the Coronavirus (COVID-19).  Place mask on and await 911 to arrive.    If Emergency Dept, If possible, please have another adult drive you to the Emergency Dept but you need to wear mask when in contact with other people.      Treatment Options:   Patient classified as COVID treatment eligible by Epic high risk algorithm: Yes  Is the patient symptomatic at the time of result notification? Yes. Was the onset of symptoms within the last 5 days? No. Was the onset of symptoms within the last 7 days? No.     Review information with Patient    Your result was positive. This means you have COVID-19 (coronavirus).    How can I protect others?    These guidelines are for isolating before returning to work, school or .    If you DO have symptoms    Stay home and away from others     For at least 5 days after your symptoms started, AND    You are fever free for 24 hours (with no medicine that reduces fever), AND    Your other symptoms are better    Wear a mask for 10 full days anytime you are around others    If you DON'T have  symptoms    Stay home and away from others for at least 5 days after your positive test    Wear a mask for 10 full days anytime you are around others    There may be different guidelines for healthcare facilities.  Please check with the specific sites before arriving.    If you have been told by a doctor that you were severely ill with COVID-19 or are immunocompromised, you should isolate for at least 10 days.    You should not go back to work until you meet the guidelines above for ending your home isolation. You don't need to be retested for COVID-19 before going back to work--studies show that you won't spread the virus if it's been at least 10 days since your symptoms started (or 20 days, if you have a weak immune system).    Employers, schools, and daycares: This is an official notice for this person's medical guidelines for returning in-person.  They must meet the above guidelines before going back to work, school or  in person.    You will receive a positive COVID-19 letter via [employer] or the mail soon with additional self-care information (exception, no letters will be sent to presurgical/preprocedure patients).    Would you like me to review some of that information with you now?  No    If you were tested for an upcoming procedure, please contact your provider for next steps.    Priscilla George

## 2022-05-20 NOTE — PROGRESS NOTES
This is a recent snapshot of the patient's [facility] medical record.  For current drug dose and complete information and questions, call [phone] or In Basket pool, [facility] home infusion ([id number])  CSN Number:  [id number]

## 2022-05-20 NOTE — PROGRESS NOTES
This is a recent snapshot of the patient's [facility] medical record.  For current drug dose and complete information and questions, call [phone] or In Basket pool, [facility]v home infusion ([id number])  CSN Number:  [id number]

## 2022-05-24 ENCOUNTER — TELEPHONE (OUTPATIENT)
Dept: PULMONOLOGY | Facility: CLINIC | Age: 62
End: 2022-05-24

## 2022-05-24 NOTE — TELEPHONE ENCOUNTER
Spoke with pt regarding his CT and appt with Dr. Garcia today. Informed him that Dr. Garcia is not feeling well and we have to cancel his appts. Informed him that we would make a game plan about getting him back into the clinic and would call him when we knew more. Pt voiced understanding.

## 2022-05-26 ENCOUNTER — LAB (OUTPATIENT)
Dept: LAB | Facility: CLINIC | Age: 62
End: 2022-05-26
Attending: INTERNAL MEDICINE
Payer: COMMERCIAL

## 2022-05-26 ENCOUNTER — OFFICE VISIT (OUTPATIENT)
Dept: OPHTHALMOLOGY | Facility: CLINIC | Age: 62
End: 2022-05-26

## 2022-05-26 ENCOUNTER — ONCOLOGY VISIT (OUTPATIENT)
Dept: TRANSPLANT | Facility: CLINIC | Age: 62
End: 2022-05-26
Attending: INTERNAL MEDICINE
Payer: COMMERCIAL

## 2022-05-26 ENCOUNTER — HOME INFUSION (PRE-WILLOW HOME INFUSION) (OUTPATIENT)
Dept: PHARMACY | Facility: CLINIC | Age: 62
End: 2022-05-26

## 2022-05-26 ENCOUNTER — TELEPHONE (OUTPATIENT)
Dept: PULMONOLOGY | Facility: CLINIC | Age: 62
End: 2022-05-26

## 2022-05-26 VITALS
OXYGEN SATURATION: 97 % | HEART RATE: 73 BPM | SYSTOLIC BLOOD PRESSURE: 118 MMHG | RESPIRATION RATE: 16 BRPM | DIASTOLIC BLOOD PRESSURE: 74 MMHG | TEMPERATURE: 97.9 F

## 2022-05-26 DIAGNOSIS — H04.129 DRY EYE: Primary | ICD-10-CM

## 2022-05-26 DIAGNOSIS — Z94.81 STATUS POST BONE MARROW TRANSPLANT (H): ICD-10-CM

## 2022-05-26 DIAGNOSIS — Z00.6 RESEARCH STUDY PATIENT: Primary | ICD-10-CM

## 2022-05-26 DIAGNOSIS — I15.8 OTHER SECONDARY HYPERTENSION: Primary | ICD-10-CM

## 2022-05-26 DIAGNOSIS — Z20.822 SUSPECTED 2019 NOVEL CORONAVIRUS INFECTION: ICD-10-CM

## 2022-05-26 DIAGNOSIS — T86.09 GVHD AS COMPLICATION OF BONE MARROW TRANSPLANT (H): ICD-10-CM

## 2022-05-26 DIAGNOSIS — D89.813 GVHD AS COMPLICATION OF BONE MARROW TRANSPLANT (H): ICD-10-CM

## 2022-05-26 DIAGNOSIS — C91.01 ACUTE LYMPHOBLASTIC LEUKEMIA (ALL) IN REMISSION (H): ICD-10-CM

## 2022-05-26 DIAGNOSIS — H16.123 FILAMENTARY KERATITIS OF BOTH EYES: ICD-10-CM

## 2022-05-26 LAB
ALBUMIN SERPL-MCNC: 3 G/DL (ref 3.4–5)
ALP SERPL-CCNC: 156 U/L (ref 40–150)
ALT SERPL W P-5'-P-CCNC: 48 U/L (ref 0–70)
ANION GAP SERPL CALCULATED.3IONS-SCNC: 7 MMOL/L (ref 3–14)
AST SERPL W P-5'-P-CCNC: 47 U/L (ref 0–45)
BASOPHILS # BLD AUTO: 0 10E3/UL (ref 0–0.2)
BASOPHILS NFR BLD AUTO: 1 %
BILIRUB SERPL-MCNC: 0.7 MG/DL (ref 0.2–1.3)
BUN SERPL-MCNC: 32 MG/DL (ref 7–30)
CALCIUM SERPL-MCNC: 9.6 MG/DL (ref 8.5–10.1)
CHLORIDE BLD-SCNC: 106 MMOL/L (ref 94–109)
CO2 SERPL-SCNC: 27 MMOL/L (ref 20–32)
CREAT SERPL-MCNC: 0.93 MG/DL (ref 0.66–1.25)
EOSINOPHIL # BLD AUTO: 0.1 10E3/UL (ref 0–0.7)
EOSINOPHIL NFR BLD AUTO: 1 %
ERYTHROCYTE [DISTWIDTH] IN BLOOD BY AUTOMATED COUNT: 21.8 % (ref 10–15)
GFR SERPL CREATININE-BSD FRML MDRD: >90 ML/MIN/1.73M2
GLUCOSE BLD-MCNC: 96 MG/DL (ref 70–99)
HCT VFR BLD AUTO: 40.8 % (ref 40–53)
HGB BLD-MCNC: 14.1 G/DL (ref 13.3–17.7)
IMM GRANULOCYTES # BLD: 0.1 10E3/UL
IMM GRANULOCYTES NFR BLD: 1 %
LYMPHOCYTES # BLD AUTO: 2 10E3/UL (ref 0.8–5.3)
LYMPHOCYTES NFR BLD AUTO: 34 %
MAGNESIUM SERPL-MCNC: 1.7 MG/DL (ref 1.6–2.3)
MCH RBC QN AUTO: 32 PG (ref 26.5–33)
MCHC RBC AUTO-ENTMCNC: 34.6 G/DL (ref 31.5–36.5)
MCV RBC AUTO: 93 FL (ref 78–100)
MONOCYTES # BLD AUTO: 0.6 10E3/UL (ref 0–1.3)
MONOCYTES NFR BLD AUTO: 10 %
NEUTROPHILS # BLD AUTO: 3.1 10E3/UL (ref 1.6–8.3)
NEUTROPHILS NFR BLD AUTO: 53 %
NRBC # BLD AUTO: 0 10E3/UL
NRBC BLD AUTO-RTO: 0 /100
PLATELET # BLD AUTO: 126 10E3/UL (ref 150–450)
POTASSIUM BLD-SCNC: 4.2 MMOL/L (ref 3.4–5.3)
PROT SERPL-MCNC: 6.6 G/DL (ref 6.8–8.8)
RBC # BLD AUTO: 4.41 10E6/UL (ref 4.4–5.9)
SARS-COV-2 RNA RESP QL NAA+PROBE: POSITIVE
SODIUM SERPL-SCNC: 140 MMOL/L (ref 133–144)
TACROLIMUS BLD-MCNC: 3.1 UG/L (ref 5–15)
TME LAST DOSE: ABNORMAL H
TME LAST DOSE: ABNORMAL H
WBC # BLD AUTO: 5.8 10E3/UL (ref 4–11)

## 2022-05-26 PROCEDURE — 99213 OFFICE O/P EST LOW 20 MIN: CPT | Performed by: OPTOMETRIST

## 2022-05-26 PROCEDURE — 80053 COMPREHEN METABOLIC PANEL: CPT

## 2022-05-26 PROCEDURE — 80197 ASSAY OF TACROLIMUS: CPT

## 2022-05-26 PROCEDURE — 36591 DRAW BLOOD OFF VENOUS DEVICE: CPT

## 2022-05-26 PROCEDURE — U0003 INFECTIOUS AGENT DETECTION BY NUCLEIC ACID (DNA OR RNA); SEVERE ACUTE RESPIRATORY SYNDROME CORONAVIRUS 2 (SARS-COV-2) (CORONAVIRUS DISEASE [COVID-19]), AMPLIFIED PROBE TECHNIQUE, MAKING USE OF HIGH THROUGHPUT TECHNOLOGIES AS DESCRIBED BY CMS-2020-01-R: HCPCS

## 2022-05-26 PROCEDURE — 83735 ASSAY OF MAGNESIUM: CPT

## 2022-05-26 PROCEDURE — 250N000011 HC RX IP 250 OP 636: Performed by: INTERNAL MEDICINE

## 2022-05-26 PROCEDURE — 99215 OFFICE O/P EST HI 40 MIN: CPT | Mod: GC

## 2022-05-26 PROCEDURE — G0463 HOSPITAL OUTPT CLINIC VISIT: HCPCS | Mod: 25

## 2022-05-26 PROCEDURE — 85004 AUTOMATED DIFF WBC COUNT: CPT

## 2022-05-26 PROCEDURE — 36415 COLL VENOUS BLD VENIPUNCTURE: CPT

## 2022-05-26 RX ORDER — HEPARIN SODIUM (PORCINE) LOCK FLUSH IV SOLN 100 UNIT/ML 100 UNIT/ML
5 SOLUTION INTRAVENOUS ONCE
Status: COMPLETED | OUTPATIENT
Start: 2022-05-26 | End: 2022-05-26

## 2022-05-26 RX ORDER — AMLODIPINE BESYLATE 2.5 MG/1
2.5 TABLET ORAL DAILY
Qty: 90 TABLET | Refills: 1 | Status: SHIPPED | OUTPATIENT
Start: 2022-05-26 | End: 2022-09-06

## 2022-05-26 RX ORDER — FLUOROMETHOLONE 0.1 %
1 SUSPENSION, DROPS(FINAL DOSAGE FORM)(ML) OPHTHALMIC (EYE) 2 TIMES DAILY
Qty: 5 ML | Refills: 1 | Status: SHIPPED | OUTPATIENT
Start: 2022-05-26 | End: 2022-06-10

## 2022-05-26 RX ORDER — TACROLIMUS 1 MG/1
1 CAPSULE ORAL 2 TIMES DAILY
Qty: 120 CAPSULE | Refills: 1 | Status: SHIPPED | OUTPATIENT
Start: 2022-05-26 | End: 2022-05-27

## 2022-05-26 RX ADMIN — Medication 5 ML: at 12:08

## 2022-05-26 ASSESSMENT — VISUAL ACUITY
METHOD: SNELLEN - LINEAR
OD_PH_SC: 20/25
OD_SC+: -2
OD_PH_SC+: -1
OD_SC: 20/50
OS_PH_SC+: -2
OS_SC+: -2
OS_PH_SC: 20/25
OS_SC: 20/30

## 2022-05-26 ASSESSMENT — TONOMETRY
OD_IOP_MMHG: 9
OS_IOP_MMHG: 9
IOP_METHOD: TONOPEN

## 2022-05-26 ASSESSMENT — SLIT LAMP EXAM - LIDS
COMMENTS: 1+ BLEPH, THICKENED MARGINS, 1-2+ MGD
COMMENTS: 1+ BLEPH, THICKENED MARGINS, 1-2+ MGD

## 2022-05-26 ASSESSMENT — PAIN SCALES - GENERAL: PAINLEVEL: NO PAIN (0)

## 2022-05-26 ASSESSMENT — CONF VISUAL FIELD
OD_NORMAL: 1
METHOD: COUNTING FINGERS
OS_NORMAL: 1

## 2022-05-26 ASSESSMENT — EXTERNAL EXAM - LEFT EYE: OS_EXAM: DERMATOCHALASIS

## 2022-05-26 ASSESSMENT — EXTERNAL EXAM - RIGHT EYE: OD_EXAM: DERMATOCHALASIS

## 2022-05-26 NOTE — NURSING NOTE
Chief Complaints and History of Present Illnesses   Patient presents with     Follow Up     Chief Complaint(s) and History of Present Illness(es)     Follow Up     Laterality: both eyes    Associated symptoms: dryness, eye pain, photophobia and foreign body sensation.  Negative for redness and pain with eye movement    Pain scale: 3/10              Comments     Patient present following up regarding GVH disease each eye.     BOGDAN Pollard May 26, 2022 1:20 PM

## 2022-05-26 NOTE — PROGRESS NOTES
A/P  1.) Dry Eye OU  -s/p BMT 08/2021  -Previously minimal signs of corneal dryness on AT qid and Maxitrol benny, warm compress  -Failed Restasis (stinging)  -Significantly more staining right eye>left eye today than seen previously (over past two months)  -Start Xiidra bid OU. Start FML pulse until Xiidra takes effect.   -Unable to place punctal plugs today (suspect scarring in puncta). May benefit from punctal cautery OU in the future  -Can also trial Tyrvaya   -Consider scleral lens as next step if symptoms worsen or do not improve    2.) Hyperopia/Presbyopia OU  -BCVA 20/20 with correction. Uses low plus readers for distance and higher plus readers for near    RTC 2-3 months recheck, sooner prn    I have confirmed the patient's CC, HPI and reviewed Past Medical History, Past Surgical History, Social History, Family History, Problem List, Medication List and agree with Tech note.     Aisha Juarez, OD [facility]

## 2022-05-26 NOTE — LETTER
5/26/2022         RE: Nik Nava  [id number] [id number]        Dear Colleague,    Thank you for referring your patient, Nik Nava, to the [facility] AND [facility]ARR[facility]ANSPLANT [facility] [place]. Please see a copy of my visit note below.      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    HPI: Nik returns for follow up accompanied by his wife Lamar on the phone.  He tested positive for COVID twice in the past month but in both cases reports he had only very minimal symptoms with a runny nose and some fatigue.  He now feels back to his baseline and wonders if he can have a COVID test to be cleared to continue PT.  He denies any concerns regarding GVHD flares, including dry mouth, dry skin, abdominal pain, nausea/vomiting, or diarrhea.  He does have dry eyes but thinks this is unchanged and is well controlled with his current eye regimen.  He also follows with ophthalmology and finds them helpful.        Review of Systems                                                                                                                                         8 point Review of systems was negative     PHYSICAL EXAM                                                                                                                                                 KPS: 80  /74   Pulse 73   Temp 97.9  F (36.6  C) (Oral)   Resp 16   SpO2 97%     Wt Readings from Last 4 Encounters:   05/04/22 103 kg (227 lb)   04/29/22 102.8 kg (226 lb 10.1 oz)   04/28/22 101.2 kg (223 lb)   04/28/22 101.4 kg (223 lb 8 oz)      General: NAD.  HEENT: sclera anicteric. Oropharynx with very mild lichenoid changes on bilateral inner cheeks.  No ulcerations.  Lungs:  CTA b/l  no wheezes  CV: RRR  no gallop  Abd; soft no tenderness; BS nl   Ext/ Skin: Hyperpigmentation noted on torso, back and anterior  pelvis.  LE Trace edema, no skin thickening.    cGVHD therapy started on February 24 2022  - Baseline NIH scoring 2/24/2022 : skin 2 maculopapular rash/erythema with no sclerotic features, mouth score 1 mild symptoms with lichenoid changes less than 25%, eyes score 2 moderate symptoms without new visual impairments due to KCS requiring lubricant eyedrops more than 3 times a day, overall mild scale for her provider  - 3/25/2022 1 month [facility] treatment assessment on PQRST: skin 2, mouth score 1 mild symptoms with NO lichenoid changes, eyes score 2 moderate symptoms w requiring lubricant eyedrops more than 3 times a day, liver score 1 ALT 3.7x ULN and nl bilirubin   - 3/31  Mouth clear; eyes minimal LFT still abn; no joint or new GI sx  - 4/28 Mouth clear, Left>R eye is mild sclera erythema, lids are pink and irritated appearing, LFT's slow improvement, no new joint, skin, or GI symptoms.   -5/11 mouth with mild erythema but no lichenoid changes, eyes using eyedrops 4-6 times a day score of 2, LFTs significantly improved from baseline slight elevation in alk phos and AST with normal bilirubin, skin with hyperpigmentation from old acute GVHD no active skin rashes, no GI symptoms no musculoskeletal complaints.  -5/26 Mouth with very slight lichenoid changes, erythema.  Eyes still using eyedrops 4-6x per day.  LFTs essentially normal with slight elevation in alk phos.  Skin with hyperpigmentation from old acute GVHD, no active rashes, no GI or MSK concerns.  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 S/p allo sib PBSCT. (ABO matched)  HCT-CI score: 3 (prior solid tumor)  Day +100 bone marrow biopsy is 100%  donor with no morphologic or flow cytometric evidence of leukemia BCR abl is pending.  - Day +180 restaging BM bx- still in CR  100% donor;  2/16/22 BCR ABL major breakpoint undetectable.     2.  HEME:  - Transfuse for hgb <7g/dL; plt < 10k.  No transfusions needs  - Pulmonary emboli: He developed a pulmonary emboli in the setting of receiving PEG asparaginase (ie provoked thrombus).  Xarelto complete.   - Counts are tolerating valcyte well.     3.  FEN/Renal:  - Cr improved.   - Magnesium WNL today, was hypomag likely due to tac.  Continue PO magnesium supplementation for now  - He has a history of renal cancer and resection. Has a single kidney.    4.    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   #Skin GVHD- history of biopsy proven GVHD of the skin 8/30/2021; resolved.   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   # Ocular GVHD: follows with ophthalmology. Maxitrol to lids, continue refreshing drops QID.  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   - Cont tac to 1mg BID. previously decided to stay on same dose, no checks of levels if clinically stable, and no need switch to sirolimus. (keep tac low as gvhd is quiet and viremia). 5/10 level 45 with starting of COVID therapy and D-D intractions (Paxlovid for COVID19, which has a drug interaction with tacrolimus-Ritonavir increases serum levels of tacrolimus).  On tacro 1 MG PO BID currently, level today pending     3/1-3/16: prednisone 100mg every day  3/17 75mg every day   3/31 75 alt with 50  4/6: 75 alt with 25mg every other day  4/12 pred tapered to 60 alternating with 25mg   4/15 In setting of viruses trying to reactivate,GVHD stable: Taper 60/15mg alternating.  4/20 decrease pred further to 50/10.    4/25 taper pred to 50/0 every other day as long as symptoms stable.   5/2 Pred decrease 40/0 alternating every other day.   5/13 decrease to 30/0,   5/20  decrease to 20/0 then slowly by 5 mg weekly      5.  ID: Afebrile   #COVID   Positive via home test 5/8. Treated with Paxlovid x 7 days with resolution of symptoms.  Had recurrence on 5/18.  Now asymptomatic, will retest today as Nik would like to return to PT.    #Pulmonary infiltrates:   4/20 CT chest with new RUL infiltrate. Highly immunocompromised. 4/22 Fungitell/asp GM were neg. BAL 4/29 NGTD, increased micafungin to 150mg IV daily-- f/u Dr Garcia being rescheduled. Will continue IV micafungin for now, reassess in 2 weeks.    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 4/15 started valcyte 900mg PO BID. 4/20 CMV <137, 5/10 ND, decreased to 450mg BID 4/30 - continue 4 weeks as long as CMV <137 till 5/28 + weekly CMV  - Ok to switch to acyclovir after completion of current therapy 5/28.      - PPx: ACV, micafungin 150mg daily. Pentamidine given 5/13/2022. azithro 250mg daily (stopped levaquin due to possible tendonitis).   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 S/p 4/22 and 5/4/22 rituximab 375mg/m2 5/10 ND x2, recheck today    S/p covid vaccine series 12/2021  S/p evusheld    6. Endo: Hx of graves disease; On synthroid 175 mcg daily. TSH normal 4/6/2022 no reflex to T4.     7. CV: Norvasc: 2.5mg.    8. GI:   Omeprazole for heartburn  LFTs as above-- improving.     9. Psych:   - Situational anxiety - lexapro 10mg daily.   - Insomnia: worse on steroids. Ativan, trazadone, melatonin    10. MSK: left food drop, PT      Plan:    - Decrease prednisone to 15/0 then hold until seen at follow up  - Recheck COVID today for asymptomatic clearance    RTC with Dr. Avila Tobar in 2 weeks    Patient seen and discussed with attending Dr. Avila Tobar.    Chapo Lepe MD  Hematology/Oncology/Transplant Fellow    Attestation signed by Akshat Bearden MD at 5/27/2022 12:59 PM:  ATTENDING NOTE       I have seen and personally evaluated the patient today.  I reviewed vitals, medications, laboratory results, and I  viewed pertinent imaging studies. I formulated a plan with Dr. Lepe  as documented in this note.  I spent >50% of a 60 minute visit personally communicating recommendations regarding cGVHD and other medical management. I personally performed all of the medical decision making associated with this visit.       Akshat Tobar MD       Sincerely,    BMT DOM

## 2022-05-26 NOTE — NURSING NOTE
"Oncology Rooming Note    May 26, 2022 12:29 PM   Nik Nava is a 62 year old male who presents for:    Chief Complaint   Patient presents with     Port Draw     Labs drawn via port by RN. VS taken.     Initial Vitals: /74   Pulse 73   Temp 97.9  F (36.6  C) (Oral)   Resp 16   SpO2 97%  Estimated body mass index is 29.95 kg/m  as calculated from the following:    Height as of 4/29/22: 1.854 m (6' 1\").    Weight as of 5/4/22: 103 kg (227 lb). There is no height or weight on file to calculate BSA.  No Pain (0) Comment: Data Unavailable   No LMP for male patient.  Allergies reviewed: Yes  Medications reviewed: Yes    Medications: Medication refills not needed today.  Pharmacy name entered into [email or web address]:    [facility] - [place], [place] - [address] [facility] [place], [place] - [id number]    Clinical concerns: Covid swab?     Port access was changed with a new 20g 3/4\" power needle. Labs were collected port was flushed with NS and heparin. New dressing applied. Pt tolerated well.      Cornelio White RN            "

## 2022-05-26 NOTE — TELEPHONE ENCOUNTER
Spoke with pt regarding rescheduling the canceled CT and appt with Dr. Garcia from 5/24 to 6/1. Details confirmed with pt

## 2022-05-26 NOTE — PROGRESS NOTES
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    HPI: Nik returns for follow up accompanied by his wife Lamar on the phone.  He tested positive for COVID twice in the past month but in both cases reports he had only very minimal symptoms with a runny nose and some fatigue.  He now feels back to his baseline and wonders if he can have a COVID test to be cleared to continue PT.  He denies any concerns regarding GVHD flares, including dry mouth, dry skin, abdominal pain, nausea/vomiting, or diarrhea.  He does have dry eyes but thinks this is unchanged and is well controlled with his current eye regimen.  He also follows with ophthalmology and finds them helpful.        Review of Systems                                                                                                                                         8 point Review of systems was negative     PHYSICAL EXAM                                                                                                                                                 KPS: 80  /74   Pulse 73   Temp 97.9  F (36.6  C) (Oral)   Resp 16   SpO2 97%     Wt Readings from Last 4 Encounters:   05/04/22 103 kg (227 lb)   04/29/22 102.8 kg (226 lb 10.1 oz)   04/28/22 101.2 kg (223 lb)   04/28/22 101.4 kg (223 lb 8 oz)      General: NAD.  HEENT: sclera anicteric. Oropharynx with very mild lichenoid changes on bilateral inner cheeks.  No ulcerations.  Lungs:  CTA b/l  no wheezes  CV: RRR  no gallop  Abd; soft no tenderness; BS nl   Ext/ Skin: Hyperpigmentation noted on torso, back and anterior pelvis.  LE Trace edema, no skin thickening.    cGVHD therapy started on February 24 2022  - Baseline NIH scoring 2/24/2022 : skin 2 maculopapular rash/erythema with no sclerotic features, mouth score 1 mild symptoms with lichenoid changes less than 25%, eyes score 2 moderate symptoms  without new visual impairments due to KCS requiring lubricant eyedrops more than 3 times a day, overall mild scale for her provider  - 3/25/2022 1 month [facility] treatment assessment on PQRST: skin 2, mouth score 1 mild symptoms with NO lichenoid changes, eyes score 2 moderate symptoms w requiring lubricant eyedrops more than 3 times a day, liver score 1 ALT 3.7x ULN and nl bilirubin   - 3/31  Mouth clear; eyes minimal LFT still abn; no joint or new GI sx  - 4/28 Mouth clear, Left>R eye is mild sclera erythema, lids are pink and irritated appearing, LFT's slow improvement, no new joint, skin, or GI symptoms.   -5/11 mouth with mild erythema but no lichenoid changes, eyes using eyedrops 4-6 times a day score of 2, LFTs significantly improved from baseline slight elevation in alk phos and AST with normal bilirubin, skin with hyperpigmentation from old acute GVHD no active skin rashes, no GI symptoms no musculoskeletal complaints.  -5/26 Mouth with very slight lichenoid changes, erythema.  Eyes still using eyedrops 4-6x per day.  LFTs essentially normal with slight elevation in alk phos.  Skin with hyperpigmentation from old acute GVHD, no active rashes, no GI or MSK concerns.  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 S/p allo sib PBSCT. (ABO matched)  HCT-CI score: 3 (prior solid tumor)  Day +100 bone marrow biopsy is 100% donor with no morphologic or flow cytometric evidence of leukemia BCR abl is pending.  - Day +180 restaging BM bx- still in CR  100% donor;  2/16/22 BCR ABL major breakpoint undetectable.     2.  HEME:  - Transfuse for hgb <7g/dL; plt < 10k.  No transfusions needs  - Pulmonary emboli:  He developed a pulmonary emboli in the setting of receiving PEG asparaginase (ie provoked thrombus).  Xarelto complete.   - Counts are tolerating valcyte well.     3.  FEN/Renal:  - Cr improved.   - Magnesium WNL today, was hypomag likely due to tac.  Continue PO magnesium supplementation for now  - He has a history of renal cancer and resection. Has a single kidney.    4.    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   #Skin GVHD- history of biopsy proven GVHD of the skin 8/30/2021; resolved.   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   # Ocular GVHD: follows with ophthalmology. Maxitrol to lids, continue refreshing drops QID.  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   - Cont tac to 1mg BID. previously decided to stay on same dose, no checks of levels if clinically stable, and no need switch to sirolimus. (keep tac low as gvhd is quiet and viremia). 5/10 level 45 with starting of COVID therapy and D-D intractions (Paxlovid for COVID19, which has a drug interaction with tacrolimus-Ritonavir increases serum levels of tacrolimus).  On tacro 1 MG PO BID currently, level today pending     3/1-3/16: prednisone 100mg every day  3/17 75mg every day   3/31 75 alt with 50  4/6: 75 alt with 25mg every other day  4/12 pred tapered to 60 alternating with 25mg   4/15 In setting of viruses trying to reactivate,GVHD stable: Taper 60/15mg alternating.  4/20 decrease pred further to 50/10.    4/25 taper pred to 50/0 every other day as long as symptoms stable.   5/2 Pred decrease 40/0 alternating every other day.   5/13 decrease to 30/0,   5/20 decrease to 20/0 then slowly by 5 mg weekly      5.  ID: Afebrile   #COVID   Positive via home test 5/8. Treated with Paxlovid x 7 days with resolution of symptoms.  Had recurrence on 5/18.  Now asymptomatic, will retest today as Nik would like to return to PT.    #Pulmonary infiltrates:    4/20 CT chest with new RUL infiltrate. Highly immunocompromised. 4/22 Fungitell/asp GM were neg. BAL 4/29 NGTD, increased micafungin to 150mg IV daily-- f/u Dr Garcia being rescheduled. Will continue IV micafungin for now, reassess in 2 weeks.    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 4/15 started valcyte 900mg PO BID. 4/20 CMV <137, 5/10 ND, decreased to 450mg BID 4/30 - continue 4 weeks as long as CMV <137 till 5/28 + weekly CMV  - Ok to switch to acyclovir after completion of current therapy 5/28.      - PPx: ACV, micafungin 150mg daily. Pentamidine given 5/13/2022. azithro 250mg daily (stopped levaquin due to possible tendonitis).   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 S/p 4/22 and 5/4/22 rituximab 375mg/m2 5/10 ND x2, recheck today    S/p covid vaccine series 12/2021  S/p evusheld    6. Endo: Hx of graves disease; On synthroid 175 mcg daily. TSH normal 4/6/2022 no reflex to T4.     7. CV: Norvasc: 2.5mg.    8. GI:   Omeprazole for heartburn  LFTs as above-- improving.     9. Psych:   - Situational anxiety - lexapro 10mg daily.   - Insomnia: worse on steroids. Ativan, trazadone, melatonin    10. MSK: left food drop, PT      Plan:    - Decrease prednisone to 15/0 then hold until seen at follow up  - Recheck COVID today for asymptomatic clearance    RTC with Dr. Avila Tobar in 2 weeks    Patient seen and discussed with attending Dr. Avila Tobar.    Chapo Lepe MD  Hematology/Oncology/Transplant Fellow

## 2022-05-27 ENCOUNTER — TELEPHONE (OUTPATIENT)
Dept: NURSING | Facility: CLINIC | Age: 62
End: 2022-05-27
Payer: COMMERCIAL

## 2022-05-27 ENCOUNTER — TELEPHONE (OUTPATIENT)
Dept: NURSING | Facility: CLINIC | Age: 62
End: 2022-05-27

## 2022-05-27 DIAGNOSIS — C91.01 ACUTE LYMPHOBLASTIC LEUKEMIA (ALL) IN REMISSION (H): ICD-10-CM

## 2022-05-27 DIAGNOSIS — Z94.81 STATUS POST BONE MARROW TRANSPLANT (H): ICD-10-CM

## 2022-05-27 LAB
BACTERIA BRONCH: NO GROWTH
BACTERIA BRONCH: NO GROWTH
CMV DNA SPEC NAA+PROBE-ACNC: NOT DETECTED IU/ML

## 2022-05-27 RX ORDER — TACROLIMUS 1 MG/1
CAPSULE ORAL
Qty: 120 CAPSULE | Refills: 1 | COMMUNITY
Start: 2022-05-27 | End: 2022-06-10

## 2022-05-27 RX ORDER — TACROLIMUS 0.5 MG/1
CAPSULE ORAL
Qty: 60 CAPSULE | Refills: 1 | COMMUNITY
Start: 2022-05-27 | End: 2022-06-10

## 2022-05-27 NOTE — TELEPHONE ENCOUNTER
Patient classified as COVID treatment eligible by Epic high risk algorithm:  Yes    Coronavirus (COVID-19) Notification    Reason for call  Notify of POSITIVE COVID-19 lab result, assess symptoms,  review [facility] recommendations    Lab Result   Lab test for 2019-nCoV rRt-PCR or SARS-COV-2 PCR  Oropharyngeal AND/OR nasopharyngeal swabs were POSITIVE for 2019-nCoV RNA [OR] SARS-COV-2 RNA (COVID-19) RNA     We have been unable to reach patient by phone at this time to notify of their Positive COVID-19 result.    Left voicemail message requesting a call back to [phone] [facility] for results.        A Positive COVID-19 letter will be sent via [employer] or the mail. (Exception, no letters sent to Presurgerical/Preprocedure Patients)    Lamar Dwyer LPN

## 2022-05-27 NOTE — PROGRESS NOTES
This is a recent snapshot of the patient's [facility] medical record.  For current drug dose and complete information and questions, call [phone] or In Basket pool, [facility] home infusion ([id number])  CSN Number:  [id number]

## 2022-05-30 ASSESSMENT — ENCOUNTER SYMPTOMS: EYE IRRITATION: 1

## 2022-05-31 DIAGNOSIS — T86.09 GVHD AS COMPLICATION OF BONE MARROW TRANSPLANT (H): ICD-10-CM

## 2022-05-31 DIAGNOSIS — C91.01 ACUTE LYMPHOBLASTIC LEUKEMIA (ALL) IN REMISSION (H): ICD-10-CM

## 2022-05-31 DIAGNOSIS — D89.813 GVHD AS COMPLICATION OF BONE MARROW TRANSPLANT (H): ICD-10-CM

## 2022-05-31 DIAGNOSIS — Z94.81 STATUS POST BONE MARROW TRANSPLANT (H): ICD-10-CM

## 2022-05-31 DIAGNOSIS — C91.01 ACUTE LYMPHOBLASTIC LEUKEMIA (ALL) IN REMISSION (H): Primary | ICD-10-CM

## 2022-05-31 RX ORDER — AZITHROMYCIN 250 MG/1
250 TABLET, FILM COATED ORAL DAILY
Qty: 30 TABLET | Refills: 1 | Status: SHIPPED | OUTPATIENT
Start: 2022-05-31 | End: 2022-07-26

## 2022-05-31 RX ORDER — MAGNESIUM OXIDE 400 MG/1
400 TABLET ORAL 2 TIMES DAILY
Qty: 120 TABLET | Refills: 1 | Status: SHIPPED | OUTPATIENT
Start: 2022-05-31 | End: 2022-08-18

## 2022-06-01 ENCOUNTER — ANCILLARY PROCEDURE (OUTPATIENT)
Dept: CT IMAGING | Facility: CLINIC | Age: 62
End: 2022-06-01
Attending: INTERNAL MEDICINE
Payer: COMMERCIAL

## 2022-06-01 ENCOUNTER — OFFICE VISIT (OUTPATIENT)
Dept: PULMONOLOGY | Facility: CLINIC | Age: 62
End: 2022-06-01
Attending: INTERNAL MEDICINE
Payer: COMMERCIAL

## 2022-06-01 VITALS
HEART RATE: 77 BPM | DIASTOLIC BLOOD PRESSURE: 69 MMHG | WEIGHT: 227 LBS | OXYGEN SATURATION: 95 % | BODY MASS INDEX: 29.95 KG/M2 | SYSTOLIC BLOOD PRESSURE: 116 MMHG

## 2022-06-01 DIAGNOSIS — J18.9 PNEUMONIA OF RIGHT UPPER LOBE DUE TO INFECTIOUS ORGANISM: ICD-10-CM

## 2022-06-01 DIAGNOSIS — R05.9 COUGH: Primary | ICD-10-CM

## 2022-06-01 PROCEDURE — 71250 CT THORAX DX C-: CPT | Mod: GC | Performed by: RADIOLOGY

## 2022-06-01 PROCEDURE — G0463 HOSPITAL OUTPT CLINIC VISIT: HCPCS

## 2022-06-01 PROCEDURE — 99214 OFFICE O/P EST MOD 30 MIN: CPT | Performed by: INTERNAL MEDICINE

## 2022-06-01 ASSESSMENT — PAIN SCALES - GENERAL: PAINLEVEL: NO PAIN (0)

## 2022-06-01 NOTE — PROGRESS NOTES
Reason for Visit  Nik Nava is a 62 year old year old male who is being seen for Follow Up (1 month follow up )    Pulmonary HPI  60 YO with ALL s/p PBSCT in 8-21 (Day + 261) referred to the Pulmonary clinic by Dr. Hill for an evaluation of an abnormal chest CT.  Denies previous history of pulmonary disease. At present no cough, SOB or sputum production.  No fevers or chills.  Diagnosed with cGvHD of eyes, skin, GI tract and liver.  Was placed on high dose steroids (3-1-22) at 100 mg per day; since that time has been tapering down dose, currently on 50 mg every other day. 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  Denies mold exposure at home.  Has cabin but has not been there this Winter.  No tobacco x 3 years, smoked since age 17, few packs per day.  FH- lung cancer in mother.    Last seen 1 month ago.  Underwent bronchoscopy with BAL after that clinic visit; all cultures were negative.  Since his last visit he has been continued on micafungin.  Developed COVID on 5-7 associated with cough and change in voice; contacted BMT clinic and was started on Paxlovid and felt better within a few days.  One week after that he developed recurrence of his symptoms for the next 5 days.  Since that time his symptoms have all resolved.  Repeat chest CT was personally reviewed in clinic; some areas of GGO and nodular infiltrate are improved but some are improved.  Last IgG level was > 850 on 4-12-22.      The patient was seen and examined by Murtaza Garcia MD           Current Outpatient Medications   Medication     acyclovir (ZOVIRAX) 800 MG tablet     amLODIPine (NORVASC) 2.5 MG tablet     amLODIPine (NORVASC) 5 MG tablet     azithromycin (ZITHROMAX) 250 MG tablet     Calcium Carb-Cholecalciferol 600-800 MG-UNIT CHEW     Carboxymethylcell-Glycerin PF (REFRESH RELIEVA PF) 0.5-1 % SOLN     dexamethasone AF  (DECADRON) 0.1 MG/ML solution     fluorometholone (FML LIQUIFILM) 0.1 % ophthalmic suspension     levothyroxine (SYNTHROID/LEVOTHROID) 175 MCG tablet     lifitegrast (XIIDRA) 5 % opthalmic solution     LORazepam (ATIVAN) 1 MG tablet     magnesium oxide (MAG-OX) 400 MG tablet     melatonin 5 MG CAPS     micafungin (MYCAMINE) 50 MG injection     neomycin-polymyxin-dexamethasone (MAXITROL) 3.5-94365-9.1 ophthalmic ointment     nicotine polacrilex (NICORETTE) 4 MG gum     omeprazole (PRILOSEC) 40 MG DR capsule     predniSONE (DELTASONE) 10 MG tablet     predniSONE (DELTASONE) 20 MG tablet     predniSONE (DELTASONE) 50 MG tablet     predniSONE (DELTASONE) 50 MG tablet     sennosides (SENOKOT) 8.6 MG tablet     tacrolimus (GENERIC EQUIVALENT) 0.5 MG capsule     tacrolimus (GENERIC EQUIVALENT) 1 MG capsule     tacrolimus (PROTOPIC) 0.1 % external ointment     traZODone (DESYREL) 50 MG tablet     valGANciclovir (VALCYTE) 450 MG tablet     No current facility-administered medications for this visit.     Allergies   Allergen Reactions     Sulfamethoxazole W/Trimethoprim Rash     Past Medical History:   Diagnosis Date     Cancer (H)     renal cell     CKD (chronic kidney disease)      Thyroid disease        Past Surgical History:   Procedure Laterality Date     BACK SURGERY  2017     BONE MARROW BIOPSY, BONE SPECIMEN, NEEDLE/TROCAR Left 9/2/2021    Procedure: BIOPSY, BONE MARROW;  Surgeon: Jailyn Ny APRN CNP;  Location: [facility] OR     BONE MARROW BIOPSY, BONE SPECIMEN, NEEDLE/TROCAR Left 11/15/2021    Procedure: BIOPSY, BONE MARROW;  Surgeon: Socrates De La Torre;  Location: [facility] OR     BONE MARROW BIOPSY, BONE SPECIMEN, NEEDLE/TROCAR Left 2/7/2022    Procedure: BIOPSY, BONE MARROW;  Surgeon: Renetta Wiggins PA-C;  Location: [facility] OR     BRONCHOSCOPY (RIGID OR FLEXIBLE), DIAGNOSTIC N/A 4/29/2022    Procedure: BRONCHOSCOPY, WITH BRONCHOALVEOLAR LAVAGE;  Surgeon: Murtaza Garcia MD;  Location: [facility] GI     IR CVC  TUNNEL PLACEMENT > 5 YRS OF AGE  8/4/2021     IR CVC TUNNEL REMOVAL LEFT  9/2/2021     IR LIVER BIOPSY PERCUTANEOUS  2/21/2022     PERCUTANEOUS BIOPSY LIVER N/A 2/21/2022    Procedure: NEEDLE BIOPSY, LIVER, PERCUTANEOUS;  Surgeon: Trace Castellanos MD;  [place] [facility]       Social History     Socioeconomic History     Marital status:      Spouse name: Not on file     Number of children: Not on file     Years of education: Not on file     Highest education level: Not on file   Occupational History     Not on file   Tobacco Use     Smoking status: Former Smoker     Packs/day: 2.00     Years: 30.00     Pack years: 60.00     Quit date: 5/4/2019     Years since quitting: 3.0     Smokeless tobacco: Never Used   Substance and Sexual Activity     Alcohol use: Not Currently     Drug use: Never     Sexual activity: Not on file   Other Topics Concern     Parent/sibling w/ CABG, MI or angioplasty before 65F 55M? Not Asked   Social History Narrative     Not on file     Social Determinants of Health     Financial Resource Strain: Not on file   Food Insecurity: Not on file   Transportation Needs: Not on file   Physical Activity: Not on file   Stress: Not on file   Social Connections: Not on file   Intimate Partner Violence: Not on file   Housing Stability: Not on file       ROS Pulmonary  A complete ROS was otherwise negative except as noted in the HPI.  Wt 103 kg (227 lb)   BMI 29.95 kg/m    Exam:   GENERAL APPEARANCE: Well developed, well nourished, alert, and in no apparent distress.  EYES: PERRL, EOMI  HENT: Nasal mucosa with no edema and no hyperemia. No nasal polyps.  EARS: Canals clear, TMs normal  MOUTH: Oral mucosa is moist, without any lesions, no tonsillar enlargement, no oropharyngeal exudate.  NECK: supple, no masses, no thyromegaly.  LYMPHATICS: No significant axillary, cervical, or supraclavicular nodes.  RESP:  Good air flow throughout.  No crackles. No rhonchi. No wheezes.  CV: Normal S1, S2, regular  rhythm, normal rate. No murmur.  No rub. No gallop. No LE edema.   ABDOMEN:  Bowel sounds normal, soft, nontender, no HSM or masses.   MS: extremities normal. No clubbing. No cyanosis.  SKIN: no rash on limited exam  NEURO: Mentation intact, speech normal, normal strength and tone, normal gait and stance  PSYCH: mentation appears normal. and affect normal/bright  Results:  Recent Results (from the past 168 hour(s))   CMV Quantitative, PCR    Collection Time: 05/26/22 12:10 PM    Specimen: Line, venous; Blood   Result Value Ref Range    CMV DNA IU/mL Not Detected Not Detected IU/mL   Magnesium    Collection Time: 05/26/22 12:10 PM   Result Value Ref Range    Magnesium 1.7 1.6 - 2.3 mg/dL   Tacrolimus by Tandem Mass Spectrometry    Collection Time: 05/26/22 12:10 PM   Result Value Ref Range    Tacrolimus by Tandem Mass Spectrometry 3.1 (L) 5.0 - 15.0 ug/L    Tacrolimus Last Dose Date 5/25/2022     Tacrolimus Last Dose Time  7:03 PM    Comprehensive metabolic panel    Collection Time: 05/26/22 12:10 PM   Result Value Ref Range    Sodium 140 133 - 144 mmol/L    Potassium 4.2 3.4 - 5.3 mmol/L    Chloride 106 94 - 109 mmol/L    Carbon Dioxide (CO2) 27 20 - 32 mmol/L    Anion Gap 7 3 - 14 mmol/L    Urea Nitrogen 32 (H) 7 - 30 mg/dL    Creatinine 0.93 0.66 - 1.25 mg/dL    Calcium 9.6 8.5 - 10.1 mg/dL    Glucose 96 70 - 99 mg/dL    Alkaline Phosphatase 156 (H) 40 - 150 U/L    AST 47 (H) 0 - 45 U/L    ALT 48 0 - 70 U/L    Protein Total 6.6 (L) 6.8 - 8.8 g/dL    Albumin 3.0 (L) 3.4 - 5.0 g/dL    Bilirubin Total 0.7 0.2 - 1.3 mg/dL    GFR Estimate >90 >60 mL/min/1.73m2   CBC with platelets and differential    Collection Time: 05/26/22 12:10 PM   Result Value Ref Range    WBC Count 5.8 4.0 - 11.0 10e3/uL    RBC Count 4.41 4.40 - 5.90 10e6/uL    Hemoglobin 14.1 13.3 - 17.7 g/dL    Hematocrit 40.8 40.0 - 53.0 %    MCV 93 78 - 100 fL    MCH 32.0 26.5 - 33.0 pg    MCHC 34.6 31.5 - 36.5 g/dL    RDW 21.8 (H) 10.0 - 15.0 %    Platelet  Count 126 (L) 150 - 450 10e3/uL    % Neutrophils 53 %    % Lymphocytes 34 %    % Monocytes 10 %    % Eosinophils 1 %    % Basophils 1 %    % Immature Granulocytes 1 %    NRBCs per 100 WBC 0 <1 /100    Absolute Neutrophils 3.1 1.6 - 8.3 10e3/uL    Absolute Lymphocytes 2.0 0.8 - 5.3 10e3/uL    Absolute Monocytes 0.6 0.0 - 1.3 10e3/uL    Absolute Eosinophils 0.1 0.0 - 0.7 10e3/uL    Absolute Basophils 0.0 0.0 - 0.2 10e3/uL    Absolute Immature Granulocytes 0.1 <=0.4 10e3/uL    Absolute NRBCs 0.0 10e3/uL   Asymptomatic COVID-19 Virus (Coronavirus) by PCR Nose    Collection Time: 05/26/22 12:59 PM    Specimen: Nose; Swab   Result Value Ref Range    SARS CoV2 PCR Positive (A) Negative, Testing sent to reference lab. Results will be returned via unsolicited result       Assessment and plan:  60 YO with ALL s/p BMT complicated by cGvHD requiring high dose steroids.  Now with nodular infiltrate on CT concerning for an atypical infection (Fungal, Nocardia, or atypical Mycobacteria).  Needed further invasive testing to attempt to identify a pathogen.  Follow-up CT with mixed results- some areas are better, some worse.  COVID19 since his last visit, new findings on CT may be secondary to COVID versus continued fungal infection.  Would continue micafungin for an additional month and assess follow-up CT.     1. Continue Micafungin  2. Follow-up CT in one month  3. IgG level to be drawn next week     RTC in one month.  Patient advised to contact the clinic with any questions or concerns prior to his next clinic visit           Answers for HPI/ROS submitted by the patient on 5/30/2022  General Symptoms: No  Skin Symptoms: No  HENT Symptoms: No  EYE SYMPTOMS: Yes  HEART SYMPTOMS: No  LUNG SYMPTOMS: No  INTESTINAL SYMPTOMS: No  URINARY SYMPTOMS: No  REPRODUCTIVE SYMPTOMS: No  SKELETAL SYMPTOMS: No  BLOOD SYMPTOMS: No  NERVOUS SYSTEM SYMPTOMS: No  MENTAL HEALTH SYMPTOMS: No  Dry eyes: Yes  Eye irritation: Yes

## 2022-06-01 NOTE — LETTER
6/1/2022         RE: Nik Nava  [address] [facility] [id number]        Dear Colleague,    Thank you for referring your patient, Nik Nava, to the [facility] FOR LUNG SCIENCE AND [facility] [place]. Please see a copy of my visit note below.    Reason for Visit  Nik Nava is a 62 year old year old male who is being seen for Follow Up (1 month follow up )    Pulmonary HPI  62 YO with ALL s/p PBSCT in 8-21 (Day + 261) referred to the Pulmonary clinic by Dr. Hill for an evaluation of an abnormal chest CT.  Denies previous history of pulmonary disease. At present no cough, SOB or sputum production.  No fevers or chills.  Diagnosed with cGvHD of eyes, skin, GI tract and liver.  Was placed on high dose steroids (3-1-22) at 100 mg per day; since that time has been tapering down dose, currently on 50 mg every other day. 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  Denies mold exposure at home.  Has cabin but has not been there this Winter.  No tobacco x 3 years, smoked since age 17, few packs per day.  FH- lung cancer in mother.    Last seen 1 month ago.  Underwent bronchoscopy with BAL after that clinic visit; all cultures were negative.  Since his last visit he has been continued on micafungin.  Developed COVID on 5-7 associated with cough and change in voice; contacted BMT clinic and was started on Paxlovid and felt better within a few days.  One week after that he developed recurrence of his symptoms for the next 5 days.  Since that time his symptoms have all resolved.  Repeat chest CT was personally reviewed in clinic; some areas of GGO and nodular infiltrate are improved but some are improved.  Last IgG level was > 850 on 4-12-22.      The patient was seen and examined by Murtaza Garcia MD           Current Outpatient Medications   Medication      acyclovir (ZOVIRAX) 800 MG tablet     amLODIPine (NORVASC) 2.5 MG tablet     amLODIPine (NORVASC) 5 MG tablet     azithromycin (ZITHROMAX) 250 MG tablet     Calcium Carb-Cholecalciferol 600-800 MG-UNIT CHEW     Carboxymethylcell-Glycerin PF (REFRESH RELIEVA PF) 0.5-1 % SOLN     dexamethasone AF (DECADRON) 0.1 MG/ML solution     fluorometholone (FML LIQUIFILM) 0.1 % ophthalmic suspension     levothyroxine (SYNTHROID/LEVOTHROID) 175 MCG tablet     lifitegrast (XIIDRA) 5 % opthalmic solution     LORazepam (ATIVAN) 1 MG tablet     magnesium oxide (MAG-OX) 400 MG tablet     melatonin 5 MG CAPS     micafungin (MYCAMINE) 50 MG injection     neomycin-polymyxin-dexamethasone (MAXITROL) 3.5-76400-7.1 ophthalmic ointment     nicotine polacrilex (NICORETTE) 4 MG gum     omeprazole (PRILOSEC) 40 MG DR capsule     predniSONE (DELTASONE) 10 MG tablet     predniSONE (DELTASONE) 20 MG tablet     predniSONE (DELTASONE) 50 MG tablet     predniSONE (DELTASONE) 50 MG tablet     sennosides (SENOKOT) 8.6 MG tablet     tacrolimus (GENERIC EQUIVALENT) 0.5 MG capsule     tacrolimus (GENERIC EQUIVALENT) 1 MG capsule     tacrolimus (PROTOPIC) 0.1 % external ointment     traZODone (DESYREL) 50 MG tablet     valGANciclovir (VALCYTE) 450 MG tablet     No current facility-administered medications for this visit.     Allergies   Allergen Reactions     Sulfamethoxazole W/Trimethoprim Rash     Past Medical History:   Diagnosis Date     Cancer (H)     renal cell     CKD (chronic kidney disease)      Thyroid disease        Past Surgical History:   Procedure Laterality Date     BACK SURGERY  2017     BONE MARROW BIOPSY, BONE SPECIMEN, NEEDLE/TROCAR Left 9/2/2021    Procedure: BIOPSY, BONE MARROW;  Surgeon: Jailyn Ny APRN CNP;  Location: [facility] OR     BONE MARROW BIOPSY, BONE SPECIMEN, NEEDLE/TROCAR Left 11/15/2021    Procedure: BIOPSY, BONE MARROW;  Surgeon: Socrates De La Torre;  Location: [facility] OR     BONE MARROW BIOPSY, BONE SPECIMEN,  NEEDLE/TROCAR Left 2/7/2022    Procedure: BIOPSY, BONE MARROW;  Surgeon: Renetta Wiggins PA-C;  Location: [facility] OR     BRONCHOSCOPY (RIGID OR FLEXIBLE), DIAGNOSTIC N/A 4/29/2022    Procedure: BRONCHOSCOPY, WITH BRONCHOALVEOLAR LAVAGE;  Surgeon: Murtaza Garcia MD;  Location: [facility]     IR CVC TUNNEL PLACEMENT > 5 YRS OF AGE  8/4/2021     IR CVC TUNNEL REMOVAL LEFT  9/2/2021     IR LIVER BIOPSY PERCUTANEOUS  2/21/2022     PERCUTANEOUS BIOPSY LIVER N/A 2/21/2022    Procedure: NEEDLE BIOPSY, LIVER, PERCUTANEOUS;  Surgeon: Trace Castellanos MD;  Location: [facility] OR       Social History     Socioeconomic History     Marital status:      Spouse name: Not on file     Number of children: Not on file     Years of education: Not on file     Highest education level: Not on file   Occupational History     Not on file   Tobacco Use     Smoking status: Former Smoker     Packs/day: 2.00     Years: 30.00     Pack years: 60.00     Quit date: 5/4/2019     Years since quitting: 3.0     Smokeless tobacco: Never Used   Substance and Sexual Activity     Alcohol use: Not Currently     Drug use: Never     Sexual activity: Not on file   Other Topics Concern     Parent/sibling w/ CABG, MI or angioplasty before 65F 55M? Not Asked   Social History Narrative     Not on file     Social Determinants of Health     Financial Resource Strain: Not on file   Food Insecurity: Not on file   Transportation Needs: Not on file   Physical Activity: Not on file   Stress: Not on file   Social Connections: Not on file   Intimate Partner Violence: Not on file   Housing Stability: Not on file       ROS Pulmonary  A complete ROS was otherwise negative except as noted in the HPI.  Wt 103 kg (227 lb)   BMI 29.95 kg/m    Exam:   GENERAL APPEARANCE: Well developed, well nourished, alert, and in no apparent distress.  EYES: PERRL, EOMI  HENT: Nasal mucosa with no edema and no hyperemia. No nasal polyps.  EARS: Canals clear, TMs normal  MOUTH: Oral  mucosa is moist, without any lesions, no tonsillar enlargement, no oropharyngeal exudate.  NECK: supple, no masses, no thyromegaly.  LYMPHATICS: No significant axillary, cervical, or supraclavicular nodes.  RESP:  Good air flow throughout.  No crackles. No rhonchi. No wheezes.  CV: Normal S1, S2, regular rhythm, normal rate. No murmur.  No rub. No gallop. No LE edema.   ABDOMEN:  Bowel sounds normal, soft, nontender, no HSM or masses.   MS: extremities normal. No clubbing. No cyanosis.  SKIN: no rash on limited exam  NEURO: Mentation intact, speech normal, normal strength and tone, normal gait and stance  PSYCH: mentation appears normal. and affect normal/bright  Results:  Recent Results (from the past 168 hour(s))   CMV Quantitative, PCR    Collection Time: 05/26/22 12:10 PM    Specimen: Line, venous; Blood   Result Value Ref Range    CMV DNA IU/mL Not Detected Not Detected IU/mL   Magnesium    Collection Time: 05/26/22 12:10 PM   Result Value Ref Range    Magnesium 1.7 1.6 - 2.3 mg/dL   Tacrolimus by Tandem Mass Spectrometry    Collection Time: 05/26/22 12:10 PM   Result Value Ref Range    Tacrolimus by Tandem Mass Spectrometry 3.1 (L) 5.0 - 15.0 ug/L    Tacrolimus Last Dose Date 5/25/2022     Tacrolimus Last Dose Time  7:03 PM    Comprehensive metabolic panel    Collection Time: 05/26/22 12:10 PM   Result Value Ref Range    Sodium 140 133 - 144 mmol/L    Potassium 4.2 3.4 - 5.3 mmol/L    Chloride 106 94 - 109 mmol/L    Carbon Dioxide (CO2) 27 20 - 32 mmol/L    Anion Gap 7 3 - 14 mmol/L    Urea Nitrogen 32 (H) 7 - 30 mg/dL    Creatinine 0.93 0.66 - 1.25 mg/dL    Calcium 9.6 8.5 - 10.1 mg/dL    Glucose 96 70 - 99 mg/dL    Alkaline Phosphatase 156 (H) 40 - 150 U/L    AST 47 (H) 0 - 45 U/L    ALT 48 0 - 70 U/L    Protein Total 6.6 (L) 6.8 - 8.8 g/dL    Albumin 3.0 (L) 3.4 - 5.0 g/dL    Bilirubin Total 0.7 0.2 - 1.3 mg/dL    GFR Estimate >90 >60 mL/min/1.73m2   CBC with platelets and differential    Collection Time:  05/26/22 12:10 PM   Result Value Ref Range    WBC Count 5.8 4.0 - 11.0 10e3/uL    RBC Count 4.41 4.40 - 5.90 10e6/uL    Hemoglobin 14.1 13.3 - 17.7 g/dL    Hematocrit 40.8 40.0 - 53.0 %    MCV 93 78 - 100 fL    MCH 32.0 26.5 - 33.0 pg    MCHC 34.6 31.5 - 36.5 g/dL    RDW 21.8 (H) 10.0 - 15.0 %    Platelet Count 126 (L) 150 - 450 10e3/uL    % Neutrophils 53 %    % Lymphocytes 34 %    % Monocytes 10 %    % Eosinophils 1 %    % Basophils 1 %    % Immature Granulocytes 1 %    NRBCs per 100 WBC 0 <1 /100    Absolute Neutrophils 3.1 1.6 - 8.3 10e3/uL    Absolute Lymphocytes 2.0 0.8 - 5.3 10e3/uL    Absolute Monocytes 0.6 0.0 - 1.3 10e3/uL    Absolute Eosinophils 0.1 0.0 - 0.7 10e3/uL    Absolute Basophils 0.0 0.0 - 0.2 10e3/uL    Absolute Immature Granulocytes 0.1 <=0.4 10e3/uL    Absolute NRBCs 0.0 10e3/uL   Asymptomatic COVID-19 Virus (Coronavirus) by PCR Nose    Collection Time: 05/26/22 12:59 PM    Specimen: Nose; Swab   Result Value Ref Range    SARS CoV2 PCR Positive (A) Negative, Testing sent to reference lab. Results will be returned via unsolicited result       Assessment and plan:  62 YO with ALL s/p BMT complicated by cGvHD requiring high dose steroids.  Now with nodular infiltrate on CT concerning for an atypical infection (Fungal, Nocardia, or atypical Mycobacteria).  Needed further invasive testing to attempt to identify a pathogen.  Follow-up CT with mixed results- some areas are better, some worse.  COVID19 since his last visit, new findings on CT may be secondary to COVID versus continued fungal infection.  Would continue micafungin for an additional month and assess follow-up CT.     1. Continue Micafungin  2. Follow-up CT in one month  3. IgG level to be drawn next week     RTC in one month.  Patient advised to contact the clinic with any questions or concerns prior to his next clinic visit           Answers for HPI/ROS submitted by the patient on 5/30/2022  General Symptoms: No  Skin Symptoms: No  HENT  Symptoms: No  EYE SYMPTOMS: Yes  HEART SYMPTOMS: No  LUNG SYMPTOMS: No  INTESTINAL SYMPTOMS: No  URINARY SYMPTOMS: No  REPRODUCTIVE SYMPTOMS: No  SKELETAL SYMPTOMS: No  BLOOD SYMPTOMS: No  NERVOUS SYSTEM SYMPTOMS: No  MENTAL HEALTH SYMPTOMS: No  Dry eyes: Yes  Eye irritation: Yes      Again, thank you for allowing me to participate in the care of your patient.      Sincerely,    Murtaza Garcia MD

## 2022-06-01 NOTE — NURSING NOTE
Chief Complaint   Patient presents with     Follow Up     1 month follow up        Vitals were taken and medications were reconciled.     Jenny Thapa RMA  3:22 PM

## 2022-06-02 ENCOUNTER — HOME INFUSION (PRE-WILLOW HOME INFUSION) (OUTPATIENT)
Dept: PHARMACY | Facility: CLINIC | Age: 62
End: 2022-06-02
Payer: COMMERCIAL

## 2022-06-03 ENCOUNTER — HOME INFUSION (PRE-WILLOW HOME INFUSION) (OUTPATIENT)
Dept: PHARMACY | Facility: CLINIC | Age: 62
End: 2022-06-03
Payer: COMMERCIAL

## 2022-06-06 ENCOUNTER — TELEPHONE (OUTPATIENT)
Dept: OPTOMETRY | Facility: CLINIC | Age: 62
End: 2022-06-06

## 2022-06-06 NOTE — PROGRESS NOTES
This is a recent snapshot of the patient's [facility] medical record.  For current drug dose and complete information and questions, call [phone] or In Basket pool, [facility] ([id number])  CSN Number:  [id number]

## 2022-06-06 NOTE — TELEPHONE ENCOUNTER
LVM after consultation with provider.  The provider asked that the patient increase artificial tears and use thicker gel tears or ointment at night.  If symptoms do not improve the provider would like to fit a scleral lens on the patient.

## 2022-06-06 NOTE — TELEPHONE ENCOUNTER
[facility] Call Center    Phone Message    May a detailed message be left on voicemail: yes     Reason for Call: Other: Pt called in stating that the Xiidra eye drops burn for about 20 minutes and don't seem to be doing anything other than that. Pt has been using them as they were prescribed but he having a hard time with the burning. Please call to discuss. Thank you      Action Taken: Message routed to:  [facility] Eye    Travel Screening: Not Applicable

## 2022-06-07 ENCOUNTER — LAB (OUTPATIENT)
Dept: LAB | Facility: CLINIC | Age: 62
End: 2022-06-07
Attending: INTERNAL MEDICINE
Payer: COMMERCIAL

## 2022-06-07 ENCOUNTER — ONCOLOGY VISIT (OUTPATIENT)
Dept: TRANSPLANT | Facility: CLINIC | Age: 62
End: 2022-06-07
Attending: INTERNAL MEDICINE
Payer: COMMERCIAL

## 2022-06-07 ENCOUNTER — TELEPHONE (OUTPATIENT)
Dept: OPHTHALMOLOGY | Facility: CLINIC | Age: 62
End: 2022-06-07

## 2022-06-07 VITALS
TEMPERATURE: 97.9 F | SYSTOLIC BLOOD PRESSURE: 118 MMHG | RESPIRATION RATE: 19 BRPM | OXYGEN SATURATION: 98 % | HEART RATE: 90 BPM | DIASTOLIC BLOOD PRESSURE: 82 MMHG

## 2022-06-07 DIAGNOSIS — Z94.81 STATUS POST BONE MARROW TRANSPLANT (H): ICD-10-CM

## 2022-06-07 DIAGNOSIS — C91.01 ACUTE LYMPHOBLASTIC LEUKEMIA (ALL) IN REMISSION (H): ICD-10-CM

## 2022-06-07 DIAGNOSIS — Z94.81 STATUS POST BONE MARROW TRANSPLANT (H): Primary | ICD-10-CM

## 2022-06-07 LAB
ALBUMIN SERPL-MCNC: 3.2 G/DL (ref 3.4–5)
ALP SERPL-CCNC: 132 U/L (ref 40–150)
ALT SERPL W P-5'-P-CCNC: 39 U/L (ref 0–70)
ANION GAP SERPL CALCULATED.3IONS-SCNC: 8 MMOL/L (ref 3–14)
AST SERPL W P-5'-P-CCNC: 46 U/L (ref 0–45)
BASOPHILS # BLD AUTO: 0.1 10E3/UL (ref 0–0.2)
BASOPHILS NFR BLD AUTO: 1 %
BILIRUB SERPL-MCNC: 0.8 MG/DL (ref 0.2–1.3)
BUN SERPL-MCNC: 27 MG/DL (ref 7–30)
BURR CELLS BLD QL SMEAR: ABNORMAL
CALCIUM SERPL-MCNC: 9.8 MG/DL (ref 8.5–10.1)
CHLORIDE BLD-SCNC: 106 MMOL/L (ref 94–109)
CMV DNA SPEC NAA+PROBE-ACNC: NOT DETECTED IU/ML
CO2 SERPL-SCNC: 26 MMOL/L (ref 20–32)
CREAT SERPL-MCNC: 0.86 MG/DL (ref 0.66–1.25)
EOSINOPHIL # BLD AUTO: 0.1 10E3/UL (ref 0–0.7)
EOSINOPHIL NFR BLD AUTO: 1 %
ERYTHROCYTE [DISTWIDTH] IN BLOOD BY AUTOMATED COUNT: 21.4 % (ref 10–15)
GFR SERPL CREATININE-BSD FRML MDRD: >90 ML/MIN/1.73M2
GLUCOSE BLD-MCNC: 96 MG/DL (ref 70–99)
HCT VFR BLD AUTO: 41.6 % (ref 40–53)
HGB BLD-MCNC: 14.1 G/DL (ref 13.3–17.7)
IMM GRANULOCYTES # BLD: 0.1 10E3/UL
IMM GRANULOCYTES NFR BLD: 1 %
LYMPHOCYTES # BLD AUTO: 2 10E3/UL (ref 0.8–5.3)
LYMPHOCYTES NFR BLD AUTO: 33 %
MAGNESIUM SERPL-MCNC: 2 MG/DL (ref 1.6–2.3)
MCH RBC QN AUTO: 32.5 PG (ref 26.5–33)
MCHC RBC AUTO-ENTMCNC: 33.9 G/DL (ref 31.5–36.5)
MCV RBC AUTO: 96 FL (ref 78–100)
MONOCYTES # BLD AUTO: 1.5 10E3/UL (ref 0–1.3)
MONOCYTES NFR BLD AUTO: 24 %
NEUTROPHILS # BLD AUTO: 2.4 10E3/UL (ref 1.6–8.3)
NEUTROPHILS NFR BLD AUTO: 40 %
NRBC # BLD AUTO: 0 10E3/UL
NRBC BLD AUTO-RTO: 0 /100
PLAT MORPH BLD: ABNORMAL
PLATELET # BLD AUTO: 170 10E3/UL (ref 150–450)
POTASSIUM BLD-SCNC: 4 MMOL/L (ref 3.4–5.3)
PROT SERPL-MCNC: 6.8 G/DL (ref 6.8–8.8)
RBC # BLD AUTO: 4.34 10E6/UL (ref 4.4–5.9)
RBC MORPH BLD: ABNORMAL
SODIUM SERPL-SCNC: 140 MMOL/L (ref 133–144)
TACROLIMUS BLD-MCNC: 2.8 UG/L (ref 5–15)
TME LAST DOSE: ABNORMAL H
TME LAST DOSE: ABNORMAL H
WBC # BLD AUTO: 6.1 10E3/UL (ref 4–11)

## 2022-06-07 PROCEDURE — G0463 HOSPITAL OUTPT CLINIC VISIT: HCPCS

## 2022-06-07 PROCEDURE — 82040 ASSAY OF SERUM ALBUMIN: CPT

## 2022-06-07 PROCEDURE — U0005 INFEC AGEN DETEC AMPLI PROBE: HCPCS

## 2022-06-07 PROCEDURE — 99215 OFFICE O/P EST HI 40 MIN: CPT | Performed by: INTERNAL MEDICINE

## 2022-06-07 PROCEDURE — 80053 COMPREHEN METABOLIC PANEL: CPT

## 2022-06-07 PROCEDURE — 87799 DETECT AGENT NOS DNA QUANT: CPT

## 2022-06-07 PROCEDURE — 85025 COMPLETE CBC W/AUTO DIFF WBC: CPT

## 2022-06-07 PROCEDURE — 80197 ASSAY OF TACROLIMUS: CPT

## 2022-06-07 PROCEDURE — 83735 ASSAY OF MAGNESIUM: CPT

## 2022-06-07 PROCEDURE — 36591 DRAW BLOOD OFF VENOUS DEVICE: CPT

## 2022-06-07 PROCEDURE — 250N000011 HC RX IP 250 OP 636: Performed by: INTERNAL MEDICINE

## 2022-06-07 RX ORDER — ATOVAQUONE 750 MG/5ML
1500 SUSPENSION ORAL DAILY
Qty: 210 ML | Refills: 4 | Status: SHIPPED | OUTPATIENT
Start: 2022-06-07 | End: 2022-07-26

## 2022-06-07 RX ORDER — HEPARIN SODIUM (PORCINE) LOCK FLUSH IV SOLN 100 UNIT/ML 100 UNIT/ML
5 SOLUTION INTRAVENOUS EVERY 8 HOURS PRN
Status: DISCONTINUED | OUTPATIENT
Start: 2022-06-07 | End: 2022-06-07 | Stop reason: HOSPADM

## 2022-06-07 RX ADMIN — Medication 5 ML: at 09:43

## 2022-06-07 ASSESSMENT — PAIN SCALES - GENERAL: PAINLEVEL: NO PAIN (0)

## 2022-06-07 NOTE — TELEPHONE ENCOUNTER
Spoke with patient after RN spoke with patient in Lobby regarding lubrication 5 minutes before instilling drops to help with the burning sensation of the Xiidra.    Informed patient  stated patient could Stop Xiidra and Continue FML (fluorometholone) drops Rx'd last time  Patient could also consider scleral contact lenses if he is interested. Patient stated he will try lubrication first and see if that helps and then go from there.-Per Patient

## 2022-06-07 NOTE — LETTER
6/7/2022         RE: Nik Nava  [id number] [facility] [id number]        Dear Colleague,    Thank you for referring your patient, Nik Nava, to the [facility] BLOOD AND MARROW TRANSPLANT PROGRAM [place]. Please see a copy of my visit note below.    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    HPI: Nik returns for follow up accompanied by his wife Lamar on the phone.  He still testing positive for covid, no resp symptoms. He denies any concerns regarding GVHD flares, including no dry mouth, no rashes or dry skin, abdominal pain, nausea/vomiting, good appetite, or diarrhea. No bleeding, no oral ulcers, no  concerns.  He does have dry eyes but thinks this is unchanged and is well controlled with his current eye regimen.  He also follows with ophthalmology and finds them helpful.      Review of Systems                                                                                                                                         10 point Review of systems was negative     PHYSICAL EXAM                                                                                                                                                 KPS: 80  /82   Pulse 90   Temp 97.9  F (36.6  C) (Oral)   Resp 19   SpO2 98%     Wt Readings from Last 4 Encounters:   06/01/22 103 kg (227 lb)   05/04/22 103 kg (227 lb)   04/29/22 102.8 kg (226 lb 10.1 oz)   04/28/22 101.2 kg (223 lb)      General: NAD.  HEENT: sclera anicteric. Oropharynx with very mild lichenoid changes on bilateral inner cheeks.  No ulcerations.  Lungs:  CTA b/l  no wheezes  CV: RRR  no gallop  Abd; soft no tenderness; BS nl   Ext/ Skin: Hyperpigmentation noted on torso, back and anterior pelvis.  LE Trace edema, no skin thickening.    cGVHD therapy started on February 24 2022  - Baseline NIH scoring 2/24/2022 : skin 2  maculopapular rash/erythema with no sclerotic features, mouth score 1 mild symptoms with lichenoid changes less than 25%, eyes score 2 moderate symptoms without new visual impairments due to KCS requiring lubricant eyedrops more than 3 times a day, overall mild scale for her provider  - 3/25/2022 1 month [facility] treatment assessment on PQRST: skin 2, mouth score 1 mild symptoms with NO lichenoid changes, eyes score 2 moderate symptoms w requiring lubricant eyedrops more than 3 times a day, liver score 1 ALT 3.7x ULN and nl bilirubin   - 3/31  Mouth clear; eyes minimal LFT still abn; no joint or new GI sx  - 4/28 Mouth clear, Left>R eye is mild sclera erythema, lids are pink and irritated appearing, LFT's slow improvement, no new joint, skin, or GI symptoms.   -5/11 mouth with mild erythema but no lichenoid changes, eyes using eyedrops 4-6 times a day score of 2, LFTs significantly improved from baseline slight elevation in alk phos and AST with normal bilirubin, skin with hyperpigmentation from old acute GVHD no active skin rashes, no GI symptoms no musculoskeletal complaints.  -5/26 Mouth with very slight lichenoid changes, erythema.  Eyes still using eyedrops 4-6x per day.  LFTs essentially normal with slight elevation in alk phos.  Skin with hyperpigmentation from old acute GVHD, no active rashes, no GI or MSK concerns.  Skin 0, mouth 0 but with lichenoid changes, eyes 2  -6/7/22 Mouth with no lichenoid changes, erythema.  Eyes still using eyedrops 4-6x per day.  LFTs essentially normal with slight elevation in alk phos.  Skin with hyperpigmentation from old acute GVHD, no active rashes, no GI or MSK concerns.  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 S/p allo sib PBSCT. (ABO matched)  HCT-CI score: 3 (prior solid tumor)  Day +100 bone marrow biopsy is 100% donor with no morphologic or flow cytometric evidence of leukemia BCR abl is pending.  - Day +180 restaging BM bx- still in CR  100% donor;  2/16/22 BCR ABL major breakpoint undetectable.     2.  HEME:  - Transfuse for hgb <7g/dL; plt < 10k.  No transfusions needs  - Pulmonary emboli: He developed a pulmonary emboli in the setting of receiving PEG asparaginase (ie provoked thrombus).  Xarelto complete.   - Counts are tolerating valcyte well.     3.  FEN/Renal:  - Cr improved.   - Magnesium WNL today, was hypomag likely due to tac.  Continue PO magnesium supplementation for now  - He has a history of renal cancer and resection. Has a single kidney.    4.    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   #Skin GVHD- history of biopsy proven GVHD of the skin 8/30/2021; resolved.   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 Essentially normal now.  # Ocular GVHD: follows with ophthalmology. Maxitrol to lids, continue refreshing drops QID.  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   - Cont tac to 1mg BID. previously decided to stay on same dose, no checks of levels if clinically stable, and no need switch to sirolimus. (keep tac low as gvhd is quiet and viremia). 5/10 level 45 with starting of COVID therapy and D-D intractions (Paxlovid for COVID19, which has a drug interaction with tacrolimus-Ritonavir increases serum levels of tacrolimus). On tacro 1/1.5 MG PO currently, level today pending     3/1-3/16: prednisone 100mg every day  3/17 75mg every day   3/31 75 alt with 50  4/6: 75 alt with 25mg every other day  4/12 pred tapered to 60 alternating with 25mg   4/15 In setting of viruses trying to reactivate,GVHD stable: Taper 60/15mg alternating.  4/20 decrease pred further to  50/10.    4/25 taper pred to 50/0 every other day as long as symptoms stable.   5/2 Pred decrease 40/0 alternating every other day.   5/13 decrease to 30/0  5/20 decrease to 20/0 then slowly by 5 mg weekly  6/7 decrease to 10/0      5.  ID: Afebrile   #COVID   Positive via home test 5/8. Treated with Paxlovid x 7 days with resolution of symptoms.  Had recurrence on 5/18.  Now asymptomatic, will retest today as Nik would like to return to PT.    #Pulmonary infiltrates:   4/20 CT chest with new RUL infiltrate. Highly immunocompromised. 4/22 Fungitell/asp GM were neg. BAL 4/29 NGTD, increased micafungin to 150mg IV daily-- f/u 6/1 Dr Garcia CT with mixed results will continue francisco javier for 1 month and recheck and follow up with him.     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 4/15 started valcyte 900mg PO BID. 4/20 CMV <137, 5/10 ND, decreased to 450mg BID 4/30 - continue 4 weeks as long as CMV <137 till 5/28 + weekly CMV  - Switch to acyclovir after completion of current therapy 5/28. 5/26 CMV ND.    - PPx: ACV, micafungin 150mg daily. Pentamidine given 5/13/2022. azithro 250mg daily (stopped levaquin due to possible tendonitis).   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 S/p 4/22 and 5/4/22 rituximab 375mg/m2 5/10 ND x2, recheck 6/7 pending    S/p covid vaccine series 12/2021  S/p evusheld    6. Endo: Hx of graves disease; On synthroid 175 mcg daily. TSH normal 4/6/2022 no reflex to T4.     7. CV: Norvasc: 2.5mg.    8. GI:   Omeprazole for heartburn  LFTs as above-- improving.     9. Psych:   - Situational anxiety - lexapro 10mg daily.   - Insomnia: worse on steroids. Ativan, trazadone, melatonin    10. MSK: left food drop, PT      Plan:    - Decrease prednisone to 10/0 then hold until seen at follow up  - Recheck COVID today for asymptomatic clearance  - EBV pending    RTC with Dr. Avila Tobar in 2 weeks        Again, thank you for allowing me to participate in the care of your patient.       Sincerely,    Akshat Tobar MD

## 2022-06-07 NOTE — NURSING NOTE
"Oncology Rooming Note    June 7, 2022 9:35 AM   Nik Nava is a 62 year old male who presents for:    Chief Complaint   Patient presents with     Oncology Clinic Visit     Pt here to visit with provider r/t  ALL     Initial Vitals: /82   Pulse 90   Temp 97.9  F (36.6  C) (Oral)   Resp 19   SpO2 98%  Estimated body mass index is 29.95 kg/m  as calculated from the following:    Height as of 4/29/22: 1.854 m (6' 1\").    Weight as of 6/1/22: 103 kg (227 lb). There is no height or weight on file to calculate BSA.  No Pain (0) Comment: Data Unavailable   No LMP for male patient.  Allergies reviewed: Yes  Medications reviewed: Yes    Medications: Medication refills not needed today.  Pharmacy name entered into [employer]:    [facility] - [place], [place] - [address] [facility] [place], [place] - [phone]    Clinical concerns: SHELDON Corrales RN              "

## 2022-06-08 ENCOUNTER — TELEPHONE (OUTPATIENT)
Dept: NURSING | Facility: CLINIC | Age: 62
End: 2022-06-08
Payer: COMMERCIAL

## 2022-06-08 DIAGNOSIS — Z94.81 STATUS POST BONE MARROW TRANSPLANT (H): Primary | ICD-10-CM

## 2022-06-08 LAB
CYCLE THRESHOLD (CT): 37.4
EBV DNA # SPEC NAA+PROBE: NOT DETECTED COPIES/ML
SARS-COV-2 RNA RESP QL NAA+PROBE: POSITIVE

## 2022-06-08 NOTE — TELEPHONE ENCOUNTER
Coronavirus (COVID-19) Notification    Caller Name (Patient, parent, daughter/son, grandparent, etc)  Patient    Reason for call  Notify of Positive Coronavirus (COVID-19) lab results, assess symptoms,  review [facility] recommendations    Lab Result    Lab test:  2019-nCoV rRt-PCR or SARS-CoV-2 PCR    Oropharyngeal AND/OR nasopharyngeal swabs is POSITIVE for 2019-nCoV RNA/SARS-COV-2 PCR (COVID-19 virus)      Gather patient reported symptoms   Assessment   Current Symptoms at time of phone call, reported by patient: (if no symptoms, document: No symptoms] No sx   Date of symptom(s) onset (if applicable) N/A, 3rd positive test     If at time of call, Patients symptoms have worsened, the Patient should contact 911 or have someone drive them to Emergency Dept promptly:      If Patient calling 911, inform 911 personal that you have tested positive for the Coronavirus (COVID-19).  Place mask on and await 911 to arrive.    If Emergency Dept, If possible, please have another adult drive you to the Emergency Dept but you need to wear mask when in contact with other people.      Treatment Options:   Patient classified as COVID treatment eligible by Epic high risk algorithm: Yes  Is the patient symptomatic at the time of result notification? No    Review information with Patient    Your result was positive. This means you have COVID-19 (coronavirus).    How can I protect others?    These guidelines are for isolating before returning to work, school or .    If you DO have symptoms    Stay home and away from others     For at least 5 days after your symptoms started, AND    You are fever free for 24 hours (with no medicine that reduces fever), AND    Your other symptoms are better    Wear a mask for 10 full days anytime you are around others    If you DON'T have symptoms    Stay home and away from others for at least 5 days after your positive test    Wear a mask for 10 full days anytime you are around  others    There may be different guidelines for healthcare facilities.  Please check with the specific sites before arriving.    If you have been told by a doctor that you were severely ill with COVID-19 or are immunocompromised, you should isolate for at least 10 days.    You should not go back to work until you meet the guidelines above for ending your home isolation. You don't need to be retested for COVID-19 before going back to work--studies show that you won't spread the virus if it's been at least 10 days since your symptoms started (or 20 days, if you have a weak immune system).    Employers, schools, and daycares: This is an official notice for this person's medical guidelines for returning in-person.  They must meet the above guidelines before going back to work, school or  in person.    You will receive a positive COVID-19 letter via [employer] or the mail soon with additional self-care information (exception, no letters will be sent to presurgical/preprocedure patients).    Would you like me to review some of that information with you now?  No    If you were tested for an upcoming procedure, please contact your provider for next steps.    Priscilla George

## 2022-06-09 ENCOUNTER — HOME INFUSION (PRE-WILLOW HOME INFUSION) (OUTPATIENT)
Dept: PHARMACY | Facility: CLINIC | Age: 62
End: 2022-06-09
Payer: COMMERCIAL

## 2022-06-10 ENCOUNTER — TELEPHONE (OUTPATIENT)
Dept: PULMONOLOGY | Facility: CLINIC | Age: 62
End: 2022-06-10
Payer: COMMERCIAL

## 2022-06-10 ENCOUNTER — TELEPHONE (OUTPATIENT)
Dept: OPTOMETRY | Facility: CLINIC | Age: 62
End: 2022-06-10

## 2022-06-10 DIAGNOSIS — H16.123 FILAMENTARY KERATITIS OF BOTH EYES: ICD-10-CM

## 2022-06-10 DIAGNOSIS — C91.01 ACUTE LYMPHOBLASTIC LEUKEMIA (ALL) IN REMISSION (H): ICD-10-CM

## 2022-06-10 DIAGNOSIS — D89.813 GVHD AS COMPLICATION OF BONE MARROW TRANSPLANT (H): ICD-10-CM

## 2022-06-10 DIAGNOSIS — Z94.81 STATUS POST BONE MARROW TRANSPLANT (H): ICD-10-CM

## 2022-06-10 DIAGNOSIS — T86.09 GVHD AS COMPLICATION OF BONE MARROW TRANSPLANT (H): ICD-10-CM

## 2022-06-10 DIAGNOSIS — H04.129 DRY EYE: ICD-10-CM

## 2022-06-10 RX ORDER — TACROLIMUS 0.5 MG/1
CAPSULE ORAL
Qty: 60 CAPSULE | Refills: 1 | COMMUNITY
Start: 2022-06-10 | End: 2022-06-10

## 2022-06-10 RX ORDER — FLUOROMETHOLONE 0.1 %
1 SUSPENSION, DROPS(FINAL DOSAGE FORM)(ML) OPHTHALMIC (EYE) 2 TIMES DAILY
Qty: 5 ML | Refills: 1 | Status: SHIPPED | OUTPATIENT
Start: 2022-06-10 | End: 2022-06-11

## 2022-06-10 RX ORDER — TACROLIMUS 1 MG/1
CAPSULE ORAL
Qty: 120 CAPSULE | Refills: 1 | Status: SHIPPED | OUTPATIENT
Start: 2022-06-10 | End: 2022-06-22

## 2022-06-10 RX ORDER — TACROLIMUS 0.5 MG/1
CAPSULE ORAL
Qty: 60 CAPSULE | Refills: 1 | Status: SHIPPED | OUTPATIENT
Start: 2022-06-10 | End: 2022-06-22

## 2022-06-10 RX ORDER — TACROLIMUS 1 MG/1
CAPSULE ORAL
Qty: 120 CAPSULE | Refills: 1 | COMMUNITY
Start: 2022-06-10 | End: 2022-06-10

## 2022-06-10 NOTE — TELEPHONE ENCOUNTER
Yes okay to stop Xiidra.     FML twice a day okay for now.     Needs to increase artificial tears to every 1-2 hours (preservative free) given lack of success with other Rx drops, and especially if he would like to avoid scleral lens. Continue ointment at night.     He has appt with me in August, I will discuss more options at that time      Above per Dr. Juarez      Called mobile number at [phone]  Reviewed information above via voicemail.    Reviewed to contact clinic for any worsening symptoms/vision changes    Provided direct number for any further assistance/questions    Chris Mireles RN 2:15 PM 06/14/22                  Spoke to pt at [phone]    Pt has used restasis in past and stopped secondary to stinging sensation all day    Xiidra use times 2 weeks and having stinging sensation with almost headache pain for about 30 minutes.  Pt states uses preservative free tear 10 minutes beforehand    Pt using PF tears about 6 times a day and also been prescribed tanner/poly/dex ointment to place in each eye at night.    Feels stingy lasting longer and not getting better over time    Reviewed if not tolerating and symptoms seem to be worsening ok to stop the Xiidra    Reviewed would forward to Dr. Juarez to review options for next steps    Pt does not look forward to scleral lenses if recommended next step-- does not like to put drops in eyes, does not feel would like contacts    Pt out of FML before 21 days up-- sent Rx and reviewed may us Good Rx if not going thru insurance.    Note to Dr. Juarez to review options/next steps next week when back in clinic    Pt seemed satisfied with information    Chris Mireles RN 3:18 PM 06/10/22                [facility] Call Center    Phone Message    May a detailed message be left on voicemail: yes     Reason for Call: Medication Refill Request    Has the patient contacted the pharmacy for the refill? Yes   Name of medication being requested: Florometholone, eye drops  Provider who  prescribed the medication: Larry  Pharmacy: [facility]  Date medication is needed: 6/10/2022     Pt is also stating that he is not tolerating the other eye drops well, Burning discomfort. (Boo) would like to speak a nurse about this one as well.     Action Taken: Message routed to:  [facility] eye    Travel Screening: Not Applicable

## 2022-06-11 RX ORDER — FLUOROMETHOLONE 0.1 %
1 SUSPENSION, DROPS(FINAL DOSAGE FORM)(ML) OPHTHALMIC (EYE) 2 TIMES DAILY
Qty: 10 ML | Refills: 0 | Status: SHIPPED | OUTPATIENT
Start: 2022-06-11 | End: 2022-08-18

## 2022-06-11 NOTE — TELEPHONE ENCOUNTER
fluorometholone (FML LIQUIFILM) 0.1 % ophthalmic suspension      Place 1 drop into both eyes 2 times daily For total of 21 days then stop  Last Written Prescription Date:  6-10-22  Last Fill Quantity: 5 ml,   # refills: 1  Last Office Visit : 6-10-22  Future Office visit:  8-18-22    Routing refill request to provider for review/approval because:  Med not on eye protocol  Pharm request: Please send new Rx only has  5 ml remain.  bottle 10 ml

## 2022-06-15 NOTE — PROGRESS NOTES
This is a recent snapshot of the patient's [facility] medical record.  For current drug dose and complete information and questions, call [phone] or In [facility] pool, fv home infusion ([id number])  CSN Number:  [id number]

## 2022-06-17 ENCOUNTER — HOME INFUSION (PRE-WILLOW HOME INFUSION) (OUTPATIENT)
Dept: PHARMACY | Facility: CLINIC | Age: 62
End: 2022-06-17

## 2022-06-17 NOTE — PROGRESS NOTES
This is a recent snapshot of the patient's [facility] medical record.  For current drug dose and complete information and questions, call [phone] or In [facility] pool, fv home infusion ([id number])  CSN Number:  [id number]

## 2022-06-21 ENCOUNTER — ONCOLOGY VISIT (OUTPATIENT)
Dept: TRANSPLANT | Facility: CLINIC | Age: 62
End: 2022-06-21
Attending: INTERNAL MEDICINE
Payer: COMMERCIAL

## 2022-06-21 ENCOUNTER — APPOINTMENT (OUTPATIENT)
Dept: LAB | Facility: CLINIC | Age: 62
End: 2022-06-21
Attending: INTERNAL MEDICINE
Payer: COMMERCIAL

## 2022-06-21 VITALS
WEIGHT: 231 LBS | SYSTOLIC BLOOD PRESSURE: 126 MMHG | TEMPERATURE: 98.3 F | DIASTOLIC BLOOD PRESSURE: 83 MMHG | OXYGEN SATURATION: 96 % | BODY MASS INDEX: 30.48 KG/M2 | HEART RATE: 80 BPM | RESPIRATION RATE: 16 BRPM

## 2022-06-21 DIAGNOSIS — R05.9 COUGH: ICD-10-CM

## 2022-06-21 DIAGNOSIS — Z94.81 STATUS POST BONE MARROW TRANSPLANT (H): ICD-10-CM

## 2022-06-21 LAB
ALBUMIN SERPL-MCNC: 3.2 G/DL (ref 3.4–5)
ALP SERPL-CCNC: 131 U/L (ref 40–150)
ALT SERPL W P-5'-P-CCNC: 60 U/L (ref 0–70)
ANION GAP SERPL CALCULATED.3IONS-SCNC: 9 MMOL/L (ref 3–14)
AST SERPL W P-5'-P-CCNC: 72 U/L (ref 0–45)
BASOPHILS # BLD AUTO: 0.1 10E3/UL (ref 0–0.2)
BASOPHILS NFR BLD AUTO: 1 %
BILIRUB SERPL-MCNC: 0.8 MG/DL (ref 0.2–1.3)
BUN SERPL-MCNC: 25 MG/DL (ref 7–30)
CALCIUM SERPL-MCNC: 9.1 MG/DL (ref 8.5–10.1)
CHLORIDE BLD-SCNC: 106 MMOL/L (ref 94–109)
CO2 SERPL-SCNC: 26 MMOL/L (ref 20–32)
CREAT SERPL-MCNC: 0.97 MG/DL (ref 0.66–1.25)
EOSINOPHIL # BLD AUTO: 0.4 10E3/UL (ref 0–0.7)
EOSINOPHIL NFR BLD AUTO: 7 %
ERYTHROCYTE [DISTWIDTH] IN BLOOD BY AUTOMATED COUNT: 19.9 % (ref 10–15)
GFR SERPL CREATININE-BSD FRML MDRD: 87 ML/MIN/1.73M2
GLUCOSE BLD-MCNC: 95 MG/DL (ref 70–99)
HCT VFR BLD AUTO: 38.6 % (ref 40–53)
HGB BLD-MCNC: 13.3 G/DL (ref 13.3–17.7)
IMM GRANULOCYTES # BLD: 0 10E3/UL
IMM GRANULOCYTES NFR BLD: 1 %
LYMPHOCYTES # BLD AUTO: 2.5 10E3/UL (ref 0.8–5.3)
LYMPHOCYTES NFR BLD AUTO: 36 %
MAGNESIUM SERPL-MCNC: 1.7 MG/DL (ref 1.6–2.3)
MCH RBC QN AUTO: 33.3 PG (ref 26.5–33)
MCHC RBC AUTO-ENTMCNC: 34.5 G/DL (ref 31.5–36.5)
MCV RBC AUTO: 97 FL (ref 78–100)
MONOCYTES # BLD AUTO: 1.5 10E3/UL (ref 0–1.3)
MONOCYTES NFR BLD AUTO: 23 %
NEUTROPHILS # BLD AUTO: 2.2 10E3/UL (ref 1.6–8.3)
NEUTROPHILS NFR BLD AUTO: 32 %
NRBC # BLD AUTO: 0 10E3/UL
NRBC BLD AUTO-RTO: 0 /100
PLATELET # BLD AUTO: 143 10E3/UL (ref 150–450)
POTASSIUM BLD-SCNC: 4.4 MMOL/L (ref 3.4–5.3)
PROT SERPL-MCNC: 6.5 G/DL (ref 6.8–8.8)
RBC # BLD AUTO: 3.99 10E6/UL (ref 4.4–5.9)
SODIUM SERPL-SCNC: 141 MMOL/L (ref 133–144)
TACROLIMUS BLD-MCNC: 2.4 UG/L (ref 5–15)
TME LAST DOSE: ABNORMAL H
TME LAST DOSE: ABNORMAL H
WBC # BLD AUTO: 6.7 10E3/UL (ref 4–11)

## 2022-06-21 PROCEDURE — G0463 HOSPITAL OUTPT CLINIC VISIT: HCPCS

## 2022-06-21 PROCEDURE — 250N000011 HC RX IP 250 OP 636: Performed by: INTERNAL MEDICINE

## 2022-06-21 PROCEDURE — 82784 ASSAY IGA/IGD/IGG/IGM EACH: CPT | Performed by: INTERNAL MEDICINE

## 2022-06-21 PROCEDURE — 85025 COMPLETE CBC W/AUTO DIFF WBC: CPT | Performed by: INTERNAL MEDICINE

## 2022-06-21 PROCEDURE — 99215 OFFICE O/P EST HI 40 MIN: CPT | Performed by: INTERNAL MEDICINE

## 2022-06-21 PROCEDURE — 82040 ASSAY OF SERUM ALBUMIN: CPT | Performed by: INTERNAL MEDICINE

## 2022-06-21 PROCEDURE — 80053 COMPREHEN METABOLIC PANEL: CPT | Performed by: INTERNAL MEDICINE

## 2022-06-21 PROCEDURE — 80197 ASSAY OF TACROLIMUS: CPT | Performed by: INTERNAL MEDICINE

## 2022-06-21 PROCEDURE — 36591 DRAW BLOOD OFF VENOUS DEVICE: CPT | Performed by: INTERNAL MEDICINE

## 2022-06-21 PROCEDURE — 83735 ASSAY OF MAGNESIUM: CPT | Performed by: INTERNAL MEDICINE

## 2022-06-21 RX ORDER — HEPARIN SODIUM (PORCINE) LOCK FLUSH IV SOLN 100 UNIT/ML 100 UNIT/ML
5 SOLUTION INTRAVENOUS ONCE
Status: COMPLETED | OUTPATIENT
Start: 2022-06-21 | End: 2022-06-21

## 2022-06-21 RX ADMIN — SODIUM CHLORIDE, PRESERVATIVE FREE 5 ML: 5 INJECTION INTRAVENOUS at 16:10

## 2022-06-21 ASSESSMENT — PAIN SCALES - GENERAL: PAINLEVEL: NO PAIN (0)

## 2022-06-21 NOTE — PROGRESS NOTES
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    HPI: Nik returns for follow up accompanied by his wife Lamar on the phone.  He still testing positive for covid, high CT, no resp symptoms. Eyes are overall stable, doing better on systane currently. He denies other concerns regarding GVHD flares, including no dry mouth or oral ulcers, no rashes or dry skin, abdominal pain, nausea/vomiting, good appetite, or diarrhea. No bleeding, no oral ulcers, no  concerns.      Review of Systems                                                                                                                                         10 point Review of systems was negative     PHYSICAL EXAM                                                                                                                                                 KPS: 80  Resp 16   Wt 104.8 kg (231 lb)   BMI 30.48 kg/m      Wt Readings from Last 4 Encounters:   06/21/22 104.8 kg (231 lb)   06/01/22 103 kg (227 lb)   05/04/22 103 kg (227 lb)   04/29/22 102.8 kg (226 lb 10.1 oz)      General: NAD.  HEENT: sclera anicteric. Oropharynx with very mild lichenoid changes on bilateral inner cheeks.  No ulcerations.  Lungs:  CTA b/l  no wheezes  CV: RRR  no gallop  Abd; soft no tenderness; BS nl   Ext/ Skin: Hyperpigmentation noted on torso, back and anterior pelvis.  LE Trace edema, no skin thickening.    cGVHD therapy started on February 24 2022  - Baseline NIH scoring 2/24/2022 : skin 2 maculopapular rash/erythema with no sclerotic features, mouth score 1 mild symptoms with lichenoid changes less than 25%, eyes score 2 moderate symptoms without new visual impairments due to KCS requiring lubricant eyedrops more than 3 times a day, overall mild scale for her provider  - 3/25/2022 1 month [facility] treatment assessment on PQRST: skin 2, mouth score 1 mild symptoms with NO lichenoid  changes, eyes score 2 moderate symptoms w requiring lubricant eyedrops more than 3 times a day, liver score 1 ALT 3.7x ULN and nl bilirubin   - 3/31  Mouth clear; eyes minimal LFT still abn; no joint or new GI sx  - 4/28 Mouth clear, Left>R eye is mild sclera erythema, lids are pink and irritated appearing, LFT's slow improvement, no new joint, skin, or GI symptoms.   -5/11 mouth with mild erythema but no lichenoid changes, eyes using eyedrops 4-6 times a day score of 2, LFTs significantly improved from baseline slight elevation in alk phos and AST with normal bilirubin, skin with hyperpigmentation from old acute GVHD no active skin rashes, no GI symptoms no musculoskeletal complaints.  -5/26 Mouth with very slight lichenoid changes, erythema.  Eyes still using eyedrops 4-6x per day.  LFTs essentially normal with slight elevation in alk phos.  Skin with hyperpigmentation from old acute GVHD, no active rashes, no GI or MSK concerns.  Skin 0, mouth 0 but with lichenoid changes, eyes 2  -6/7/22 Mouth with no lichenoid changes, erythema.  Eyes still using eyedrops 4-6x per day.  LFTs essentially normal with slight elevation in alk phos.  Skin with hyperpigmentation from old acute GVHD, no active rashes, no GI or MSK concerns.  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  Acute lymphoblastic leukemia, [place] chromosome positive in CR, MRD neg. S/p allo sib PBSCT. (ABO matched)  HCT-CI score: 3 (prior solid tumor)  Day +100 bone marrow biopsy is 100% donor with no morphologic or flow cytometric evidence of leukemia BCR abl is pending.  - Day +180 restaging BM bx- still in CR  100% donor;  2/16/22 BCR ABL major breakpoint undetectable.     2.  HEME:  - Transfuse for hgb <7g/dL; plt < 10k.  No transfusions  needs  - Pulmonary emboli: He developed a pulmonary emboli in the setting of receiving PEG asparaginase (ie provoked thrombus).  Xarelto complete.   - Counts are tolerating valcyte well.     3.  FEN/Renal:  - Cr improved.   - Magnesium WNL today, was hypomag likely due to tac.  Continue PO magnesium supplementation for now  - He has a history of renal cancer and resection. Has a single kidney.    4.    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   #Skin GVHD- history of biopsy proven GVHD of the skin 8/30/2021; resolved.   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 Essentially normal now.  # Ocular GVHD: follows with ophthalmology. Maxitrol to lids, continue refreshing drops QID.  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   - Cont tac to 1mg BID. previously decided to stay on same dose, no checks of levels if clinically stable, and no need switch to sirolimus. (keep tac low as gvhd is quiet and viremia). 5/10 level 45 with starting of COVID therapy and D-D intractions (Paxlovid for COVID19, which has a drug interaction with tacrolimus-Ritonavir increases serum levels of tacrolimus). On tacro 1.5/1.5 MG PO currently, level today pending     3/1-3/16: prednisone 100mg every day  3/17 75mg every day   3/31 75 alt with 50  4/6: 75 alt with 25mg every other day  4/12 pred tapered to 60 alternating with 25mg   4/15 In setting of viruses trying to reactivate,GVHD stable: Taper 60/15mg alternating.  4/20 decrease pred further to 50/10.    4/25 taper pred to 50/0 every other day as long as symptoms stable.   5/2 Pred decrease 40/0 alternating every other day.   5/13 decrease to 30/0  5/20 decrease to 20/0 then slowly by 5 mg weekly  6/7 decrease to 10/0      5.  ID: Afebrile   #COVID   Positive via home test 5/8. Treated with Paxlovid x 7 days with resolution of symptoms.  Had recurrence on 5/18.  Now asymptomatic, will retest  today as Nik would like to return to PT.    #Pulmonary infiltrates:   4/20 CT chest with new RUL infiltrate. Highly immunocompromised. 4/22 Fungitell/asp GM were neg. BAL 4/29 NGTD, increased micafungin to 150mg IV daily-- f/u 6/1 Dr Garcia CT with mixed results will continue francisco javier for 1 month and recheck and follow up with him.     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 4/15 started valcyte 900mg PO BID. 4/20 CMV <137, 5/10 ND, decreased to 450mg BID 4/30 - continue 4 weeks as long as CMV <137 till 5/28 + weekly CMV  - Switch to acyclovir after completion of current therapy 5/28. 5/26 CMV ND.    - PPx: ACV, micafungin 150mg daily. Pentamidine given 5/13/2022 did not tolerate atovaqone will reschedule on 7/7. Azithro 250mg daily (stopped levaquin due to possible tendonitis).   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 S/p 4/22 and 5/4/22 rituximab 375mg/m2 5/10 ND x2, 6/7 ND  S/p covid vaccine series 12/2021  S/p evusheld    6. Endo: Hx of graves disease; On synthroid 175 mcg daily. TSH normal 4/6/2022 no reflex to T4.     7. CV: Norvasc: 2.5mg.    8. GI:   Omeprazole for heartburn  LFTs as above-- improving.     9. Psych:   - Situational anxiety - lexapro 10mg daily.   - Insomnia: worse on steroids. Ativan, trazadone, melatonin    10. MSK: left food drop, PT    Plan:    - Keep prednisone to 10/0 and same dose of tacro tell we have better control of his occular symptoms.  - 7/7 has follow up with Dr. Garcia with CT chest and will add pentamidine as he is not tolerating atovaquone  - RTC with me on 7/26 and prior labs

## 2022-06-21 NOTE — NURSING NOTE
Chief Complaint   Patient presents with     Oncology Clinic Visit     Status post bone marrow transplant (H)     Port Draw     Labs drawn by RN via port, vitals taken.     Port accessed with 20 gauge 3/4 inch power port needle by RN, labs collected, line flushed with saline and heparin.  Vitals taken. Pt checked in for appointment(s).    Usha Sanabria RN

## 2022-06-21 NOTE — NURSING NOTE
"Oncology Rooming Note    June 21, 2022 4:20 PM   Nik Nava is a 62 year old male who presents for:    Chief Complaint   Patient presents with     Oncology Clinic Visit     Status post bone marrow transplant (H)     Port Draw     Labs drawn by RN via port, vitals taken.     Initial Vitals: /83   Pulse 80   Temp 98.3  F (36.8  C)   Resp 16   Wt 104.8 kg (231 lb)   SpO2 96%   BMI 30.48 kg/m   Estimated body mass index is 30.48 kg/m  as calculated from the following:    Height as of 4/29/22: 1.854 m (6' 1\").    Weight as of this encounter: 104.8 kg (231 lb). Body surface area is 2.32 meters squared.  No Pain (0) Comment: Data Unavailable   No LMP for male patient.  Allergies reviewed: Yes  Medications reviewed: Yes    Medications: MEDICATION REFILLS NEEDED TODAY. Provider was notified.  Pharmacy name entered into [employer]:    [facility] - [place], [place] - [address] [facility] - [place], [place] - [phone]    Clinical concerns: Possibly needs refill of liquid antibiotic instead-please discuss. Reports right eye dryness as ongoing.       Missy Waterman LPN June 21, 2022 4:22 PM              "

## 2022-06-21 NOTE — LETTER
6/21/2022         RE: Nik Nava  [id number] [address] [id number]        Dear Colleague,    Thank you for referring your patient, Nik Nava, to the [facility] BLOOD AND MARROW TRANSPLANT PROGRAM [place]. Please see a copy of my visit note below.      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    HPI: Nik returns for follow up accompanied by his wife Lamar on the phone.  He still testing positive for covid, high CT, no resp symptoms. Eyes are overall stable, doing better on systane currently. He denies other concerns regarding GVHD flares, including no dry mouth or oral ulcers, no rashes or dry skin, abdominal pain, nausea/vomiting, good appetite, or diarrhea. No bleeding, no oral ulcers, no  concerns.      Review of Systems                                                                                                                                         10 point Review of systems was negative     PHYSICAL EXAM                                                                                                                                                 KPS: 80  Resp 16   Wt 104.8 kg (231 lb)   BMI 30.48 kg/m      Wt Readings from Last 4 Encounters:   06/21/22 104.8 kg (231 lb)   06/01/22 103 kg (227 lb)   05/04/22 103 kg (227 lb)   04/29/22 102.8 kg (226 lb 10.1 oz)      General: NAD.  HEENT: sclera anicteric. Oropharynx with very mild lichenoid changes on bilateral inner cheeks.  No ulcerations.  Lungs:  CTA b/l  no wheezes  CV: RRR  no gallop  Abd; soft no tenderness; BS nl   Ext/ Skin: Hyperpigmentation noted on torso, back and anterior pelvis.  LE Trace edema, no skin thickening.    cGVHD therapy started on February 24 2022  - Baseline NIH scoring 2/24/2022 : skin 2 maculopapular rash/erythema with no sclerotic features, mouth score 1 mild symptoms with lichenoid changes less  than 25%, eyes score 2 moderate symptoms without new visual impairments due to KCS requiring lubricant eyedrops more than 3 times a day, overall mild scale for her provider  - 3/25/2022 1 month [facility] treatment assessment on PQRST: skin 2, mouth score 1 mild symptoms with NO lichenoid changes, eyes score 2 moderate symptoms w requiring lubricant eyedrops more than 3 times a day, liver score 1 ALT 3.7x ULN and nl bilirubin   - 3/31  Mouth clear; eyes minimal LFT still abn; no joint or new GI sx  - 4/28 Mouth clear, Left>R eye is mild sclera erythema, lids are pink and irritated appearing, LFT's slow improvement, no new joint, skin, or GI symptoms.   -5/11 mouth with mild erythema but no lichenoid changes, eyes using eyedrops 4-6 times a day score of 2, LFTs significantly improved from baseline slight elevation in alk phos and AST with normal bilirubin, skin with hyperpigmentation from old acute GVHD no active skin rashes, no GI symptoms no musculoskeletal complaints.  -5/26 Mouth with very slight lichenoid changes, erythema.  Eyes still using eyedrops 4-6x per day.  LFTs essentially normal with slight elevation in alk phos.  Skin with hyperpigmentation from old acute GVHD, no active rashes, no GI or MSK concerns.  Skin 0, mouth 0 but with lichenoid changes, eyes 2  -6/7/22 Mouth with no lichenoid changes, erythema.  Eyes still using eyedrops 4-6x per day.  LFTs essentially normal with slight elevation in alk phos.  Skin with hyperpigmentation from old acute GVHD, no active rashes, no GI or MSK concerns.  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 S/p allo sib PBSCT. (ABO matched)  HCT-CI score: 3 (prior solid tumor)  Day +100  bone marrow biopsy is 100% donor with no morphologic or flow cytometric evidence of leukemia BCR abl is pending.  - Day +180 restaging BM bx- still in CR  100% donor;  2/16/22 BCR ABL major breakpoint undetectable.     2.  HEME:  - Transfuse for hgb <7g/dL; plt < 10k.  No transfusions needs  - Pulmonary emboli: He developed a pulmonary emboli in the setting of receiving PEG asparaginase (ie provoked thrombus).  Xarelto complete.   - Counts are tolerating valcyte well.     3.  FEN/Renal:  - Cr improved.   - Magnesium WNL today, was hypomag likely due to tac.  Continue PO magnesium supplementation for now  - He has a history of renal cancer and resection. Has a single kidney.    4.    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   #Skin GVHD- history of biopsy proven GVHD of the skin 8/30/2021; resolved.   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 Essentially normal now.  # Ocular GVHD: follows with ophthalmology. Maxitrol to lids, continue refreshing drops QID.  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   - Cont tac to 1mg BID. previously decided to stay on same dose, no checks of levels if clinically stable, and no need switch to sirolimus. (keep tac low as gvhd is quiet and viremia). 5/10 level 45 with starting of COVID therapy and D-D intractions (Paxlovid for COVID19, which has a drug interaction with tacrolimus-Ritonavir increases serum levels of tacrolimus). On tacro 1.5/1.5 MG PO currently, level today pending     3/1-3/16: prednisone 100mg every day  3/17 75mg every day   3/31 75 alt with 50  4/6: 75 alt with 25mg every other day  4/12 pred tapered to 60 alternating with 25mg   4/15 In setting of viruses trying to reactivate,GVHD stable: Taper 60/15mg alternating.  4/20 decrease pred further to 50/10.    4/25 taper pred to 50/0 every other day as long as symptoms stable.   5/2 Pred decrease 40/0 alternating  every other day.   5/13 decrease to 30/0  5/20 decrease to 20/0 then slowly by 5 mg weekly  6/7 decrease to 10/0      5.  ID: Afebrile   #COVID   Positive via home test 5/8. Treated with Paxlovid x 7 days with resolution of symptoms.  Had recurrence on 5/18.  Now asymptomatic, will retest today as Nik would like to return to PT.    #Pulmonary infiltrates:   4/20 CT chest with new RUL infiltrate. Highly immunocompromised. 4/22 Fungitell/asp GM were neg. BAL 4/29 NGTD, increased micafungin to 150mg IV daily-- f/u 6/1 Dr Garcia CT with mixed results will continue francisco javier for 1 month and recheck and follow up with him.     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 4/15 started valcyte 900mg PO BID. 4/20 CMV <137, 5/10 ND, decreased to 450mg BID 4/30 - continue 4 weeks as long as CMV <137 till 5/28 + weekly CMV  - Switch to acyclovir after completion of current therapy 5/28. 5/26 CMV ND.    - PPx: ACV, micafungin 150mg daily. Pentamidine given 5/13/2022 did not tolerate atovaqone will reschedule on 7/7. Azithro 250mg daily (stopped levaquin due to possible tendonitis).   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 S/p 4/22 and 5/4/22 rituximab 375mg/m2 5/10 ND x2, 6/7 ND  S/p covid vaccine series 12/2021  S/p evusheld    6. Endo: Hx of graves disease; On synthroid 175 mcg daily. TSH normal 4/6/2022 no reflex to T4.     7. CV: Norvasc: 2.5mg.    8. GI:   Omeprazole for heartburn  LFTs as above-- improving.     9. Psych:   - Situational anxiety - lexapro 10mg daily.   - Insomnia: worse on steroids. Ativan, trazadone, melatonin    10. MSK: left food drop, PT    Plan:    - Keep prednisone to 10/0 and same dose of tacro tell we have better control of his occular symptoms.  - 7/7 has follow up with Dr. Garcia with CT chest and will add pentamidine as he is not tolerating atovaquone  - RTC with me on 7/26 and prior labs        Again, thank you for allowing me to participate in the care of your patient.       Sincerely,    Akshat Tobar MD

## 2022-06-22 ENCOUNTER — TELEPHONE (OUTPATIENT)
Dept: TRANSPLANT | Facility: CLINIC | Age: 62
End: 2022-06-22

## 2022-06-22 DIAGNOSIS — C91.01 ACUTE LYMPHOBLASTIC LEUKEMIA (ALL) IN REMISSION (H): ICD-10-CM

## 2022-06-22 DIAGNOSIS — Z94.81 STATUS POST BONE MARROW TRANSPLANT (H): ICD-10-CM

## 2022-06-22 LAB
CMV DNA SPEC NAA+PROBE-ACNC: NOT DETECTED IU/ML
IGG SERPL-MCNC: 704 MG/DL (ref 610–1616)

## 2022-06-22 RX ORDER — TACROLIMUS 0.5 MG/1
CAPSULE ORAL
Qty: 60 CAPSULE | Refills: 1 | COMMUNITY
Start: 2022-06-22 | End: 2022-07-27

## 2022-06-22 RX ORDER — TACROLIMUS 1 MG/1
CAPSULE ORAL
Qty: 120 CAPSULE | Refills: 1 | COMMUNITY
Start: 2022-06-22 | End: 2022-07-26

## 2022-06-24 LAB
ACID FAST STAIN (ARUP): NORMAL

## 2022-06-29 ENCOUNTER — LAB (OUTPATIENT)
Dept: LAB | Facility: CLINIC | Age: 62
End: 2022-06-29
Attending: INTERNAL MEDICINE
Payer: COMMERCIAL

## 2022-06-29 DIAGNOSIS — T86.09 GVHD AS COMPLICATION OF BONE MARROW TRANSPLANT (H): Primary | ICD-10-CM

## 2022-06-29 DIAGNOSIS — Z94.81 STATUS POST BONE MARROW TRANSPLANT (H): Primary | ICD-10-CM

## 2022-06-29 DIAGNOSIS — C91.01 ACUTE LYMPHOBLASTIC LEUKEMIA (ALL) IN REMISSION (H): ICD-10-CM

## 2022-06-29 DIAGNOSIS — D89.813 GVHD AS COMPLICATION OF BONE MARROW TRANSPLANT (H): Primary | ICD-10-CM

## 2022-06-29 LAB
ALBUMIN SERPL-MCNC: 3.1 G/DL (ref 3.4–5)
ALP SERPL-CCNC: 139 U/L (ref 40–150)
ALT SERPL W P-5'-P-CCNC: 107 U/L (ref 0–70)
AST SERPL W P-5'-P-CCNC: 122 U/L (ref 0–45)
BILIRUB DIRECT SERPL-MCNC: 0.2 MG/DL (ref 0–0.2)
BILIRUB SERPL-MCNC: 0.8 MG/DL (ref 0.2–1.3)
PROT SERPL-MCNC: 6.6 G/DL (ref 6.8–8.8)

## 2022-06-29 PROCEDURE — 80076 HEPATIC FUNCTION PANEL: CPT

## 2022-06-29 PROCEDURE — 36591 DRAW BLOOD OFF VENOUS DEVICE: CPT

## 2022-06-29 PROCEDURE — 250N000011 HC RX IP 250 OP 636

## 2022-06-29 RX ORDER — HEPARIN SODIUM (PORCINE) LOCK FLUSH IV SOLN 100 UNIT/ML 100 UNIT/ML
5 SOLUTION INTRAVENOUS
Status: DISCONTINUED | OUTPATIENT
Start: 2022-06-29 | End: 2022-07-05 | Stop reason: HOSPADM

## 2022-06-29 RX ORDER — HEPARIN SODIUM,PORCINE 10 UNIT/ML
5 VIAL (ML) INTRAVENOUS
Status: CANCELLED | OUTPATIENT
Start: 2022-07-07

## 2022-06-29 RX ORDER — PENTAMIDINE ISETHIONATE 300 MG/300MG
300 INHALANT RESPIRATORY (INHALATION)
Status: CANCELLED
Start: 2022-07-07

## 2022-06-29 RX ORDER — URSODIOL 300 MG/1
300 CAPSULE ORAL 2 TIMES DAILY
Qty: 60 CAPSULE | Refills: 3 | Status: SHIPPED | OUTPATIENT
Start: 2022-06-29 | End: 2022-09-20

## 2022-06-29 RX ORDER — ALBUTEROL SULFATE 5 MG/ML
2.5 SOLUTION RESPIRATORY (INHALATION) EVERY 6 HOURS PRN
Status: CANCELLED
Start: 2022-07-07

## 2022-06-29 RX ORDER — HEPARIN SODIUM (PORCINE) LOCK FLUSH IV SOLN 100 UNIT/ML 100 UNIT/ML
5 SOLUTION INTRAVENOUS
Status: CANCELLED | OUTPATIENT
Start: 2022-07-07

## 2022-06-29 RX ADMIN — Medication 5 ML: at 11:31

## 2022-06-29 NOTE — NURSING NOTE
Chief Complaint   Patient presents with     Labs Only     Labs drawn via port by RN in lab.      Port deaccessed and reaccessed by RN in lab, new dressing placed. Orders only for hepatic panel. Pt tolerated well. Labs collected.     Catherine Mahan RN

## 2022-06-30 ASSESSMENT — ENCOUNTER SYMPTOMS
NIGHT SWEATS: 0
FEVER: 0
DOUBLE VISION: 0
POLYDIPSIA: 0
HALLUCINATIONS: 0
INCREASED ENERGY: 1
POLYPHAGIA: 0
DECREASED APPETITE: 0
WEIGHT GAIN: 0
EYE IRRITATION: 1
EYE REDNESS: 1
ALTERED TEMPERATURE REGULATION: 0
WEIGHT LOSS: 0
EYE PAIN: 1
CHILLS: 0
FATIGUE: 1
EYE WATERING: 0

## 2022-07-06 DIAGNOSIS — C91.01 ACUTE LYMPHOBLASTIC LEUKEMIA (ALL) IN REMISSION (H): ICD-10-CM

## 2022-07-06 RX ORDER — ACYCLOVIR 800 MG/1
800 TABLET ORAL 2 TIMES DAILY
Qty: 60 TABLET | Refills: 1 | Status: SHIPPED | OUTPATIENT
Start: 2022-07-06 | End: 2022-11-01

## 2022-07-07 ENCOUNTER — OFFICE VISIT (OUTPATIENT)
Dept: PULMONOLOGY | Facility: CLINIC | Age: 62
End: 2022-07-07
Attending: INTERNAL MEDICINE
Payer: COMMERCIAL

## 2022-07-07 ENCOUNTER — HOME INFUSION (PRE-WILLOW HOME INFUSION) (OUTPATIENT)
Dept: PHARMACY | Facility: CLINIC | Age: 62
End: 2022-07-07

## 2022-07-07 ENCOUNTER — ANCILLARY PROCEDURE (OUTPATIENT)
Dept: CT IMAGING | Facility: CLINIC | Age: 62
End: 2022-07-07
Attending: INTERNAL MEDICINE
Payer: COMMERCIAL

## 2022-07-07 VITALS — DIASTOLIC BLOOD PRESSURE: 76 MMHG | OXYGEN SATURATION: 97 % | HEART RATE: 83 BPM | SYSTOLIC BLOOD PRESSURE: 113 MMHG

## 2022-07-07 DIAGNOSIS — Z94.81 STATUS POST BONE MARROW TRANSPLANT (H): Primary | ICD-10-CM

## 2022-07-07 DIAGNOSIS — B49 FUNGAL PNEUMONIA: Primary | ICD-10-CM

## 2022-07-07 DIAGNOSIS — J16.8 FUNGAL PNEUMONIA: Primary | ICD-10-CM

## 2022-07-07 DIAGNOSIS — R05.9 COUGH: ICD-10-CM

## 2022-07-07 PROCEDURE — 94642 AEROSOL INHALATION TREATMENT: CPT | Performed by: INTERNAL MEDICINE

## 2022-07-07 PROCEDURE — 94640 AIRWAY INHALATION TREATMENT: CPT | Performed by: INTERNAL MEDICINE

## 2022-07-07 PROCEDURE — 71250 CT THORAX DX C-: CPT | Mod: GC | Performed by: RADIOLOGY

## 2022-07-07 PROCEDURE — 99214 OFFICE O/P EST MOD 30 MIN: CPT | Performed by: INTERNAL MEDICINE

## 2022-07-07 PROCEDURE — G0463 HOSPITAL OUTPT CLINIC VISIT: HCPCS | Mod: 25

## 2022-07-07 RX ORDER — ALBUTEROL SULFATE 5 MG/ML
2.5 SOLUTION RESPIRATORY (INHALATION) EVERY 6 HOURS PRN
Status: DISCONTINUED | OUTPATIENT
Start: 2022-07-07 | End: 2022-07-07 | Stop reason: HOSPADM

## 2022-07-07 RX ORDER — ALBUTEROL SULFATE 5 MG/ML
2.5 SOLUTION RESPIRATORY (INHALATION) EVERY 6 HOURS PRN
Status: CANCELLED
Start: 2022-08-07

## 2022-07-07 RX ORDER — PENTAMIDINE ISETHIONATE 300 MG/300MG
300 INHALANT RESPIRATORY (INHALATION)
Status: CANCELLED
Start: 2022-08-07

## 2022-07-07 RX ORDER — PENTAMIDINE ISETHIONATE 300 MG/300MG
300 INHALANT RESPIRATORY (INHALATION)
Status: DISCONTINUED | OUTPATIENT
Start: 2022-07-07 | End: 2022-07-07 | Stop reason: HOSPADM

## 2022-07-07 RX ADMIN — PENTAMIDINE ISETHIONATE 300 MG: 300 INHALANT RESPIRATORY (INHALATION) at 14:26

## 2022-07-07 ASSESSMENT — PAIN SCALES - GENERAL: PAINLEVEL: NO PAIN (0)

## 2022-07-07 NOTE — LETTER
7/7/2022         RE: Nik Nava  [address] [address] [facility] [address]        Dear Colleague,    Thank you for referring your patient, Nik Nava, to the [facility] FOR LUNG SCIENCE AND [facility] [place]. Please see a copy of my visit note below.    Reason for Visit  Nik Nava is a 62 year old year old male who is being seen for Follow Up (Follow up pr Dr Toussaint )    Pulmonary HPI  62 YO with ALL s/p PBSCT in 8-21 (Day + 261) referred to the Pulmonary clinic by Dr. Hill for an evaluation of an abnormal chest CT.  Denies previous history of pulmonary disease. At present no cough, SOB or sputum production.  No fevers or chills.  Diagnosed with cGvHD of eyes, skin, GI tract and liver.  Was placed on high dose steroids (3-1-22) at 100 mg per day; since that time has been tapering down dose, currently on 50 mg every other day. 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  Denies mold exposure at home.  Has cabin but has not been there this Winter.  No tobacco x 3 years, smoked since age 17, few packs per day.  FH- lung cancer in mother.    Last seen one month ago. At his last visit he was recovering from COVID, CT chest with areas of new GGO possibly related to COVID versus fungal infection. Last IgG level was 704 on 6-21.  Since his last clinic visit he has been doing well.  No cough or SOB.  Has been maintained on micafungin.  CT chest was personally reviewed in clinic- GGO have mostly resolved, minor changes still present in the RUL.      The patient was seen and examined by Murtaza Garcia MD           Current Outpatient Medications   Medication     acyclovir (ZOVIRAX) 800 MG tablet     amLODIPine (NORVASC) 2.5 MG tablet     amLODIPine (NORVASC) 5 MG tablet     atovaquone (MEPRON) 750 MG/5ML suspension     azithromycin (ZITHROMAX) 250 MG tablet     Calcium  Carb-Cholecalciferol 600-800 MG-UNIT CHEW     Carboxymethylcell-Glycerin PF (REFRESH RELIEVA PF) 0.5-1 % SOLN     dexamethasone AF (DECADRON) 0.1 MG/ML solution     fluorometholone (FML LIQUIFILM) 0.1 % ophthalmic suspension     levothyroxine (SYNTHROID/LEVOTHROID) 175 MCG tablet     lifitegrast (XIIDRA) 5 % opthalmic solution     LORazepam (ATIVAN) 1 MG tablet     magnesium oxide (MAG-OX) 400 MG tablet     melatonin 5 MG CAPS     micafungin (MYCAMINE) 50 MG injection     neomycin-polymyxin-dexamethasone (MAXITROL) 3.5-57172-9.1 ophthalmic ointment     nicotine polacrilex (NICORETTE) 4 MG gum     omeprazole (PRILOSEC) 40 MG DR capsule     predniSONE (DELTASONE) 10 MG tablet     predniSONE (DELTASONE) 20 MG tablet     predniSONE (DELTASONE) 50 MG tablet     predniSONE (DELTASONE) 50 MG tablet     sennosides (SENOKOT) 8.6 MG tablet     tacrolimus (GENERIC EQUIVALENT) 0.5 MG capsule     tacrolimus (GENERIC EQUIVALENT) 1 MG capsule     tacrolimus (PROTOPIC) 0.1 % external ointment     traZODone (DESYREL) 50 MG tablet     ursodiol (ACTIGALL) 300 MG capsule     valGANciclovir (VALCYTE) 450 MG tablet     No current facility-administered medications for this visit.     Allergies   Allergen Reactions     Sulfamethoxazole W/Trimethoprim Rash     Past Medical History:   Diagnosis Date     Cancer (H)     renal cell     CKD (chronic kidney disease)      Thyroid disease        Past Surgical History:   Procedure Laterality Date     BACK SURGERY  2017     BONE MARROW BIOPSY, BONE SPECIMEN, NEEDLE/TROCAR Left 9/2/2021    Procedure: BIOPSY, BONE MARROW;  Surgeon: Jailyn Ny APRN CNP;  Location: [facility] OR     BONE MARROW BIOPSY, BONE SPECIMEN, NEEDLE/TROCAR Left 11/15/2021    Procedure: BIOPSY, BONE MARROW;  Surgeon: Socrates De La Torre;  Location: [facility] OR     BONE MARROW BIOPSY, BONE SPECIMEN, NEEDLE/TROCAR Left 2/7/2022    Procedure: BIOPSY, BONE MARROW;  Surgeon: Renetta Wiggins PA-C;  [place] [facility] [place]      BRONCHOSCOPY (RIGID OR FLEXIBLE), DIAGNOSTIC N/A 4/29/2022    Procedure: BRONCHOSCOPY, WITH BRONCHOALVEOLAR LAVAGE;  Surgeon: Murtaza Garcia MD;  Location: [facility] GI     IR CVC TUNNEL PLACEMENT > 5 YRS OF AGE  8/4/2021     IR CVC TUNNEL REMOVAL LEFT  9/2/2021     IR LIVER BIOPSY PERCUTANEOUS  2/21/2022     PERCUTANEOUS BIOPSY LIVER N/A 2/21/2022    Procedure: NEEDLE BIOPSY, LIVER, PERCUTANEOUS;  Surgeon: Trace Castellanos MD;  [place] [facility] [place]       Social History     Socioeconomic History     Marital status:      Spouse name: Not on file     Number of children: Not on file     Years of education: Not on file     Highest education level: Not on file   Occupational History     Not on file   Tobacco Use     Smoking status: Former Smoker     Packs/day: 2.00     Years: 30.00     Pack years: 60.00     Quit date: 5/4/2019     Years since quitting: 3.1     Smokeless tobacco: Never Used   Substance and Sexual Activity     Alcohol use: Not Currently     Drug use: Never     Sexual activity: Not on file   Other Topics Concern     Parent/sibling w/ CABG, MI or angioplasty before 65F 55M? Not Asked   Social History Narrative     Not on file     Social Determinants of Health     Financial Resource Strain: Not on file   Food Insecurity: Not on file   Transportation Needs: Not on file   Physical Activity: Not on file   Stress: Not on file   Social Connections: Not on file   Intimate Partner Violence: Not on file   Housing Stability: Not on file       ROS Pulmonary    A complete ROS was otherwise negative except as noted in the HPI.  There were no vitals taken for this visit.  Exam:   GENERAL APPEARANCE: Well developed, well nourished, alert, and in no apparent distress.  EYES: PERRL, EOMI  HENT: Nasal mucosa with no edema and no hyperemia. No nasal polyps.  EARS: Canals clear, TMs normal  MOUTH: Oral mucosa is moist, without any lesions, no tonsillar enlargement, no oropharyngeal exudate.  NECK: supple, no masses, no  thyromegaly.  LYMPHATICS: No significant axillary, cervical, or supraclavicular nodes.  RESP: normal percussion, good air flow throughout.  No crackles. No rhonchi. No wheezes.  CV: Normal S1, S2, regular rhythm, normal rate. No murmur.  No rub. No gallop. No LE edema.   ABDOMEN:  Bowel sounds normal, soft, nontender, no HSM or masses.   MS: extremities normal. No clubbing. No cyanosis.  SKIN: no rash on limited exam  NEURO: Mentation intact, speech normal, normal strength and tone, normal gait and stance  PSYCH: mentation appears normal. and affect normal/bright  Results:  No results found for this or any previous visit (from the past 168 hour(s)).    Assessment and plan:   62 YO with ALL s/p BMT complicated by cGvHD requiring high dose steroids.  Now with nodular infiltrate on CT concerning for an atypical infection (Fungal, Nocardia, or atypical Mycobacteria).  Needed further invasive testing to attempt to identify a pathogen.  Follow-up CT with mixed results- some areas are better, some worse.  COVID19 since his last visit, new findings on CT may be secondary to COVID versus continued fungal infection.  Overall CT is improved, some remaining GGO in RUL. Will review Radiology report to determine if should continue on Micafungin.    1. Will review Radiology report of chest CT for consideration of continuing Micafungin- Reviewed Radiology interpretation- will discontinue micafungin  2. Follow-up CT in one month to evaluate for recurrence or complete resolution       RTC in one month.  Patient advised to contact the clinic with any questions or concerns prior to his next clinic visit     Again, thank you for allowing me to participate in the care of your patient.        Sincerely,        Murtaza Garcia MD

## 2022-07-07 NOTE — NURSING NOTE
Chief Complaint   Patient presents with     Follow Up     Follow up pr Dr Toussaint      Vitals were taken and medications were reconciled.     Jenny Thapa RMA  3:26 PM

## 2022-07-07 NOTE — PROGRESS NOTES
Reason for Visit  Nik Nava is a 62 year old year old male who is being seen for Follow Up (Follow up pr Dr Toussaint )    Pulmonary HPI  62 YO with ALL s/p PBSCT in 8-21 (Day + 261) referred to the Pulmonary clinic by Dr. Hill for an evaluation of an abnormal chest CT.  Denies previous history of pulmonary disease. At present no cough, SOB or sputum production.  No fevers or chills.  Diagnosed with cGvHD of eyes, skin, GI tract and liver.  Was placed on high dose steroids (3-1-22) at 100 mg per day; since that time has been tapering down dose, currently on 50 mg every other day. 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  Denies mold exposure at home.  Has cabin but has not been there this Winter.  No tobacco x 3 years, smoked since age 17, few packs per day.  FH- lung cancer in mother.    Last seen one month ago. At his last visit he was recovering from COVID, CT chest with areas of new GGO possibly related to COVID versus fungal infection. Last IgG level was 704 on 6-21.  Since his last clinic visit he has been doing well.  No cough or SOB.  Has been maintained on micafungin.  CT chest was personally reviewed in clinic- GGO have mostly resolved, minor changes still present in the RUL.      The patient was seen and examined by Murtaza Garcia MD           Current Outpatient Medications   Medication     acyclovir (ZOVIRAX) 800 MG tablet     amLODIPine (NORVASC) 2.5 MG tablet     amLODIPine (NORVASC) 5 MG tablet     atovaquone (MEPRON) 750 MG/5ML suspension     azithromycin (ZITHROMAX) 250 MG tablet     Calcium Carb-Cholecalciferol 600-800 MG-UNIT CHEW     Carboxymethylcell-Glycerin PF (REFRESH RELIEVA PF) 0.5-1 % SOLN     dexamethasone AF (DECADRON) 0.1 MG/ML solution     fluorometholone (FML LIQUIFILM) 0.1 % ophthalmic suspension     levothyroxine (SYNTHROID/LEVOTHROID) 175 MCG tablet      lifitegrast (XIIDRA) 5 % opthalmic solution     LORazepam (ATIVAN) 1 MG tablet     magnesium oxide (MAG-OX) 400 MG tablet     melatonin 5 MG CAPS     micafungin (MYCAMINE) 50 MG injection     neomycin-polymyxin-dexamethasone (MAXITROL) 3.5-08414-9.1 ophthalmic ointment     nicotine polacrilex (NICORETTE) 4 MG gum     omeprazole (PRILOSEC) 40 MG DR capsule     predniSONE (DELTASONE) 10 MG tablet     predniSONE (DELTASONE) 20 MG tablet     predniSONE (DELTASONE) 50 MG tablet     predniSONE (DELTASONE) 50 MG tablet     sennosides (SENOKOT) 8.6 MG tablet     tacrolimus (GENERIC EQUIVALENT) 0.5 MG capsule     tacrolimus (GENERIC EQUIVALENT) 1 MG capsule     tacrolimus (PROTOPIC) 0.1 % external ointment     traZODone (DESYREL) 50 MG tablet     ursodiol (ACTIGALL) 300 MG capsule     valGANciclovir (VALCYTE) 450 MG tablet     No current facility-administered medications for this visit.     Allergies   Allergen Reactions     Sulfamethoxazole W/Trimethoprim Rash     Past Medical History:   Diagnosis Date     Cancer (H)     renal cell     CKD (chronic kidney disease)      Thyroid disease        Past Surgical History:   Procedure Laterality Date     BACK SURGERY  2017     BONE MARROW BIOPSY, BONE SPECIMEN, NEEDLE/TROCAR Left 9/2/2021    Procedure: BIOPSY, BONE MARROW;  Surgeon: Jailyn Ny APRN CNP;  Location: [facility] OR     BONE MARROW BIOPSY, BONE SPECIMEN, NEEDLE/TROCAR Left 11/15/2021    Procedure: BIOPSY, BONE MARROW;  Surgeon: Socrates De La Torre;  Location: [facility] OR     BONE MARROW BIOPSY, BONE SPECIMEN, NEEDLE/TROCAR Left 2/7/2022    Procedure: BIOPSY, BONE MARROW;  Surgeon: Renetta Wiggins PA-C;  Location: [facility] OR     BRONCHOSCOPY (RIGID OR FLEXIBLE), DIAGNOSTIC N/A 4/29/2022    Procedure: BRONCHOSCOPY, WITH BRONCHOALVEOLAR LAVAGE;  Surgeon: Murtaza Garcia MD;  Location: [facility]     IR CVC TUNNEL PLACEMENT > 5 YRS OF AGE  8/4/2021     IR CVC TUNNEL REMOVAL LEFT  9/2/2021     IR LIVER BIOPSY  PERCUTANEOUS  2/21/2022     PERCUTANEOUS BIOPSY LIVER N/A 2/21/2022    Procedure: NEEDLE BIOPSY, LIVER, PERCUTANEOUS;  Surgeon: Trace Castellanos MD;  [place] [facility] [place]       Social History     Socioeconomic History     Marital status:      Spouse name: Not on file     Number of children: Not on file     Years of education: Not on file     Highest education level: Not on file   Occupational History     Not on file   Tobacco Use     Smoking status: Former Smoker     Packs/day: 2.00     Years: 30.00     Pack years: 60.00     Quit date: 5/4/2019     Years since quitting: 3.1     Smokeless tobacco: Never Used   Substance and Sexual Activity     Alcohol use: Not Currently     Drug use: Never     Sexual activity: Not on file   Other Topics Concern     Parent/sibling w/ CABG, MI or angioplasty before 65F 55M? Not Asked   Social History Narrative     Not on file     Social Determinants of Health     Financial Resource Strain: Not on file   Food Insecurity: Not on file   Transportation Needs: Not on file   Physical Activity: Not on file   Stress: Not on file   Social Connections: Not on file   Intimate Partner Violence: Not on file   Housing Stability: Not on file       ROS Pulmonary    A complete ROS was otherwise negative except as noted in the HPI.  There were no vitals taken for this visit.  Exam:   GENERAL APPEARANCE: Well developed, well nourished, alert, and in no apparent distress.  EYES: PERRL, EOMI  HENT: Nasal mucosa with no edema and no hyperemia. No nasal polyps.  EARS: Canals clear, TMs normal  MOUTH: Oral mucosa is moist, without any lesions, no tonsillar enlargement, no oropharyngeal exudate.  NECK: supple, no masses, no thyromegaly.  LYMPHATICS: No significant axillary, cervical, or supraclavicular nodes.  RESP: normal percussion, good air flow throughout.  No crackles. No rhonchi. No wheezes.  CV: Normal S1, S2, regular rhythm, normal rate. No murmur.  No rub. No gallop. No LE edema.   ABDOMEN:   Bowel sounds normal, soft, nontender, no HSM or masses.   MS: extremities normal. No clubbing. No cyanosis.  SKIN: no rash on limited exam  NEURO: Mentation intact, speech normal, normal strength and tone, normal gait and stance  PSYCH: mentation appears normal. and affect normal/bright  Results:  No results found for this or any previous visit (from the past 168 hour(s)).    Assessment and plan:   62 YO with ALL s/p BMT complicated by cGvHD requiring high dose steroids.  Now with nodular infiltrate on CT concerning for an atypical infection (Fungal, Nocardia, or atypical Mycobacteria).  Needed further invasive testing to attempt to identify a pathogen.  Follow-up CT with mixed results- some areas are better, some worse.  COVID19 since his last visit, new findings on CT may be secondary to COVID versus continued fungal infection.  Overall CT is improved, some remaining GGO in RUL. Will review Radiology report to determine if should continue on Micafungin.    1. Will review Radiology report of chest CT for consideration of continuing Micafungin- Reviewed Radiology interpretation- will discontinue micafungin  2. Follow-up CT in one month to evaluate for recurrence or complete resolution       RTC in one month.  Patient advised to contact the clinic with any questions or concerns prior to his next clinic visit           Answers for HPI/ROS submitted by the patient on 6/30/2022  General Symptoms: Yes  Skin Symptoms: No  HENT Symptoms: No  EYE SYMPTOMS: Yes  HEART SYMPTOMS: No  LUNG SYMPTOMS: No  INTESTINAL SYMPTOMS: No  URINARY SYMPTOMS: No  REPRODUCTIVE SYMPTOMS: No  SKELETAL SYMPTOMS: No  BLOOD SYMPTOMS: No  NERVOUS SYSTEM SYMPTOMS: No  MENTAL HEALTH SYMPTOMS: No  Fever: No  Loss of appetite: No  Weight loss: No  Weight gain: No  Fatigue: Yes  Night sweats: No  Chills: No  Increased stress: Yes  Excessive hunger: No  Excessive thirst: No  Feeling hot or cold when others believe the temperature is normal: No  Loss of  height: No  Post-operative complications: No  Surgical site pain: No  Hallucinations: No  Change in or Loss of Energy: Yes  Hyperactivity: No  Confusion: No  Eye pain: Yes  Vision loss: No  Dry eyes: Yes  Watery eyes: No  Eye bulging: No  Double vision: No  Flashing of lights: No  Spots: No  Floaters: No  Redness: Yes  Crossed eyes: No  Tunnel Vision: No  Yellowing of eyes: No  Eye irritation: Yes

## 2022-07-07 NOTE — PROGRESS NOTES
Nik HARRY Veagsshabana was seen today for a Pentamidine nebulizer tx ordered by Natty Gay PA-C.    Patient was first given 2.5 mg of Albuterol (NDC 38954-948-94) nebulizer, after which Pentamidine 300 mg (Lot # [id number]) mixed with 6cc Sterile H20 was administered through a filtered nebulizer.    Pre-treatment: SpO2 = 98%   HR = 88   BBS = clear   Post-treatment: SpO2 =95%  HR = 90  BBS = clear    No adverse side effects noted by the patient.    This service today was provided under Dr. Perlman, who was available if needed.     Procedure was completed by Thuy Modi.

## 2022-07-08 ENCOUNTER — HOME INFUSION (PRE-WILLOW HOME INFUSION) (OUTPATIENT)
Dept: PHARMACY | Facility: CLINIC | Age: 62
End: 2022-07-08

## 2022-07-08 ENCOUNTER — TELEPHONE (OUTPATIENT)
Dept: TRANSPLANT | Facility: CLINIC | Age: 62
End: 2022-07-08

## 2022-07-08 NOTE — TELEPHONE ENCOUNTER
This writer spoke to Nik regarding his questions on Micafungin dosing.  I let Nik know that Dr. Avila Tobar would like him to take Micafungin on M/W/F (starting 7/11/22), at a decreased dose of 100mg per dose.  This writer updated [facility], and the prescription was updated.  Pharmacist was going to also connect with Nik on these changes and get the new dosage sent to him to begin on Monday 7/11/22.  Nik did not have any additional questions or concerns at this time.

## 2022-07-08 NOTE — PROGRESS NOTES
This is a recent snapshot of the patient's [facility] medical record.  For current drug dose and complete information and questions, call [phone] or In Basket pool, [facility] infusion ([id number])  CSN Number:  [id number]

## 2022-07-11 NOTE — PROGRESS NOTES
This is a recent snapshot of the patient's [facility] medical record.  For current drug dose and complete information and questions, call [phone] or In Basket pool, [facility] home infusion ([id number])  CSN Number:  [id number]

## 2022-07-13 ENCOUNTER — HOME INFUSION (PRE-WILLOW HOME INFUSION) (OUTPATIENT)
Dept: PHARMACY | Facility: CLINIC | Age: 62
End: 2022-07-13

## 2022-07-13 ENCOUNTER — LAB (OUTPATIENT)
Dept: LAB | Facility: CLINIC | Age: 62
End: 2022-07-13
Attending: INTERNAL MEDICINE
Payer: COMMERCIAL

## 2022-07-13 DIAGNOSIS — D89.813 GVHD AS COMPLICATION OF BONE MARROW TRANSPLANT (H): ICD-10-CM

## 2022-07-13 DIAGNOSIS — T86.09 GVHD AS COMPLICATION OF BONE MARROW TRANSPLANT (H): ICD-10-CM

## 2022-07-13 DIAGNOSIS — Z94.81 STATUS POST BONE MARROW TRANSPLANT (H): ICD-10-CM

## 2022-07-13 DIAGNOSIS — C91.01 ACUTE LYMPHOBLASTIC LEUKEMIA (ALL) IN REMISSION (H): ICD-10-CM

## 2022-07-13 DIAGNOSIS — Z94.81 STATUS POST BONE MARROW TRANSPLANT (H): Primary | ICD-10-CM

## 2022-07-13 LAB
ALBUMIN SERPL-MCNC: 3.4 G/DL (ref 3.4–5)
ALP SERPL-CCNC: 118 U/L (ref 40–150)
ALT SERPL W P-5'-P-CCNC: 87 U/L (ref 0–70)
ANION GAP SERPL CALCULATED.3IONS-SCNC: 9 MMOL/L (ref 3–14)
AST SERPL W P-5'-P-CCNC: 87 U/L (ref 0–45)
BASOPHILS # BLD AUTO: 0 10E3/UL (ref 0–0.2)
BASOPHILS NFR BLD AUTO: 0 %
BILIRUB DIRECT SERPL-MCNC: 0.2 MG/DL (ref 0–0.2)
BILIRUB SERPL-MCNC: 1.1 MG/DL (ref 0.2–1.3)
BUN SERPL-MCNC: 30 MG/DL (ref 7–30)
CALCIUM SERPL-MCNC: 9.8 MG/DL (ref 8.5–10.1)
CHLORIDE BLD-SCNC: 105 MMOL/L (ref 94–109)
CO2 SERPL-SCNC: 25 MMOL/L (ref 20–32)
CREAT SERPL-MCNC: 0.98 MG/DL (ref 0.66–1.25)
EOSINOPHIL # BLD AUTO: 0.1 10E3/UL (ref 0–0.7)
EOSINOPHIL NFR BLD AUTO: 1 %
ERYTHROCYTE [DISTWIDTH] IN BLOOD BY AUTOMATED COUNT: 16.7 % (ref 10–15)
GFR SERPL CREATININE-BSD FRML MDRD: 86 ML/MIN/1.73M2
GLUCOSE BLD-MCNC: 125 MG/DL (ref 70–99)
HCT VFR BLD AUTO: 39.8 % (ref 40–53)
HGB BLD-MCNC: 13.7 G/DL (ref 13.3–17.7)
IMM GRANULOCYTES # BLD: 0.1 10E3/UL
IMM GRANULOCYTES NFR BLD: 1 %
LYMPHOCYTES # BLD AUTO: 2 10E3/UL (ref 0.8–5.3)
LYMPHOCYTES NFR BLD AUTO: 22 %
MCH RBC QN AUTO: 33.9 PG (ref 26.5–33)
MCHC RBC AUTO-ENTMCNC: 34.4 G/DL (ref 31.5–36.5)
MCV RBC AUTO: 99 FL (ref 78–100)
MONOCYTES # BLD AUTO: 0.9 10E3/UL (ref 0–1.3)
MONOCYTES NFR BLD AUTO: 10 %
NEUTROPHILS # BLD AUTO: 6 10E3/UL (ref 1.6–8.3)
NEUTROPHILS NFR BLD AUTO: 66 %
NRBC # BLD AUTO: 0 10E3/UL
NRBC BLD AUTO-RTO: 0 /100
PLATELET # BLD AUTO: 167 10E3/UL (ref 150–450)
POTASSIUM BLD-SCNC: 4.6 MMOL/L (ref 3.4–5.3)
PROT SERPL-MCNC: 6.9 G/DL (ref 6.8–8.8)
RBC # BLD AUTO: 4.04 10E6/UL (ref 4.4–5.9)
SODIUM SERPL-SCNC: 139 MMOL/L (ref 133–144)
WBC # BLD AUTO: 9.1 10E3/UL (ref 4–11)

## 2022-07-13 PROCEDURE — 250N000011 HC RX IP 250 OP 636: Performed by: INTERNAL MEDICINE

## 2022-07-13 PROCEDURE — 36591 DRAW BLOOD OFF VENOUS DEVICE: CPT

## 2022-07-13 PROCEDURE — 82248 BILIRUBIN DIRECT: CPT

## 2022-07-13 PROCEDURE — 85025 COMPLETE CBC W/AUTO DIFF WBC: CPT

## 2022-07-13 PROCEDURE — 87799 DETECT AGENT NOS DNA QUANT: CPT

## 2022-07-13 PROCEDURE — 80053 COMPREHEN METABOLIC PANEL: CPT

## 2022-07-13 RX ORDER — HEPARIN SODIUM (PORCINE) LOCK FLUSH IV SOLN 100 UNIT/ML 100 UNIT/ML
5 SOLUTION INTRAVENOUS ONCE
Status: COMPLETED | OUTPATIENT
Start: 2022-07-13 | End: 2022-07-13

## 2022-07-13 RX ADMIN — Medication 5 ML: at 11:14

## 2022-07-13 NOTE — NURSING NOTE
Chief Complaint   Patient presents with     Port Draw     Labs drawn via port. Pt unsure of labs needed. 3//2022 labs drawn along with hepatic panel     Anila LARA RN PHN BSN  BMT/Oncology Lab

## 2022-07-14 LAB
CMV DNA SPEC NAA+PROBE-ACNC: NOT DETECTED IU/ML
EBV DNA # SPEC NAA+PROBE: NOT DETECTED COPIES/ML

## 2022-07-15 ENCOUNTER — HOME INFUSION (PRE-WILLOW HOME INFUSION) (OUTPATIENT)
Dept: PHARMACY | Facility: CLINIC | Age: 62
End: 2022-07-15

## 2022-07-21 ENCOUNTER — HOME INFUSION (PRE-WILLOW HOME INFUSION) (OUTPATIENT)
Dept: PHARMACY | Facility: CLINIC | Age: 62
End: 2022-07-21

## 2022-07-22 ENCOUNTER — HOME INFUSION (PRE-WILLOW HOME INFUSION) (OUTPATIENT)
Dept: PHARMACY | Facility: CLINIC | Age: 62
End: 2022-07-22

## 2022-07-22 NOTE — PROGRESS NOTES
This is a recent snapshot of the patient's [facility] medical record.  For current drug dose and complete information and questions, call [phone] or In Basket pool, [facility] home infusion ([id number])  CSN Number:  [id number]

## 2022-07-26 ENCOUNTER — ONCOLOGY VISIT (OUTPATIENT)
Dept: TRANSPLANT | Facility: CLINIC | Age: 62
End: 2022-07-26
Attending: INTERNAL MEDICINE
Payer: COMMERCIAL

## 2022-07-26 ENCOUNTER — APPOINTMENT (OUTPATIENT)
Dept: LAB | Facility: CLINIC | Age: 62
End: 2022-07-26
Attending: INTERNAL MEDICINE
Payer: COMMERCIAL

## 2022-07-26 VITALS
WEIGHT: 238.2 LBS | OXYGEN SATURATION: 97 % | BODY MASS INDEX: 31.43 KG/M2 | TEMPERATURE: 97.7 F | DIASTOLIC BLOOD PRESSURE: 85 MMHG | SYSTOLIC BLOOD PRESSURE: 134 MMHG | HEART RATE: 70 BPM | RESPIRATION RATE: 16 BRPM

## 2022-07-26 DIAGNOSIS — D89.813 GVHD AS COMPLICATION OF BONE MARROW TRANSPLANT (H): ICD-10-CM

## 2022-07-26 DIAGNOSIS — Z94.81 STATUS POST BONE MARROW TRANSPLANT (H): Primary | ICD-10-CM

## 2022-07-26 DIAGNOSIS — T86.09 GVHD AS COMPLICATION OF BONE MARROW TRANSPLANT (H): ICD-10-CM

## 2022-07-26 DIAGNOSIS — Z94.81 STATUS POST BONE MARROW TRANSPLANT (H): ICD-10-CM

## 2022-07-26 DIAGNOSIS — C91.01 ACUTE LYMPHOBLASTIC LEUKEMIA (ALL) IN REMISSION (H): ICD-10-CM

## 2022-07-26 LAB
ALBUMIN SERPL-MCNC: 3.4 G/DL (ref 3.4–5)
ALP SERPL-CCNC: 96 U/L (ref 40–150)
ALT SERPL W P-5'-P-CCNC: 77 U/L (ref 0–70)
ANION GAP SERPL CALCULATED.3IONS-SCNC: 6 MMOL/L (ref 3–14)
AST SERPL W P-5'-P-CCNC: 65 U/L (ref 0–45)
BASOPHILS # BLD AUTO: 0 10E3/UL (ref 0–0.2)
BASOPHILS NFR BLD AUTO: 1 %
BILIRUB SERPL-MCNC: 0.9 MG/DL (ref 0.2–1.3)
BUN SERPL-MCNC: 38 MG/DL (ref 7–30)
CALCIUM SERPL-MCNC: 9.6 MG/DL (ref 8.5–10.1)
CHLORIDE BLD-SCNC: 106 MMOL/L (ref 94–109)
CO2 SERPL-SCNC: 26 MMOL/L (ref 20–32)
CREAT SERPL-MCNC: 0.97 MG/DL (ref 0.66–1.25)
EOSINOPHIL # BLD AUTO: 0.1 10E3/UL (ref 0–0.7)
EOSINOPHIL NFR BLD AUTO: 1 %
ERYTHROCYTE [DISTWIDTH] IN BLOOD BY AUTOMATED COUNT: 15.4 % (ref 10–15)
GFR SERPL CREATININE-BSD FRML MDRD: 87 ML/MIN/1.73M2
GLUCOSE BLD-MCNC: 109 MG/DL (ref 70–99)
HCT VFR BLD AUTO: 40 % (ref 40–53)
HGB BLD-MCNC: 13.8 G/DL (ref 13.3–17.7)
IMM GRANULOCYTES # BLD: 0 10E3/UL
IMM GRANULOCYTES NFR BLD: 1 %
LYMPHOCYTES # BLD AUTO: 2.2 10E3/UL (ref 0.8–5.3)
LYMPHOCYTES NFR BLD AUTO: 37 %
MCH RBC QN AUTO: 35.1 PG (ref 26.5–33)
MCHC RBC AUTO-ENTMCNC: 34.5 G/DL (ref 31.5–36.5)
MCV RBC AUTO: 102 FL (ref 78–100)
MONOCYTES # BLD AUTO: 0.9 10E3/UL (ref 0–1.3)
MONOCYTES NFR BLD AUTO: 15 %
NEUTROPHILS # BLD AUTO: 2.8 10E3/UL (ref 1.6–8.3)
NEUTROPHILS NFR BLD AUTO: 45 %
NRBC # BLD AUTO: 0 10E3/UL
NRBC BLD AUTO-RTO: 0 /100
PLATELET # BLD AUTO: 127 10E3/UL (ref 150–450)
POTASSIUM BLD-SCNC: 4.3 MMOL/L (ref 3.4–5.3)
PROT SERPL-MCNC: 6.6 G/DL (ref 6.8–8.8)
RBC # BLD AUTO: 3.93 10E6/UL (ref 4.4–5.9)
SODIUM SERPL-SCNC: 138 MMOL/L (ref 133–144)
TACROLIMUS BLD-MCNC: 2.6 UG/L (ref 5–15)
TME LAST DOSE: ABNORMAL H
TME LAST DOSE: ABNORMAL H
WBC # BLD AUTO: 6.1 10E3/UL (ref 4–11)

## 2022-07-26 PROCEDURE — G0463 HOSPITAL OUTPT CLINIC VISIT: HCPCS

## 2022-07-26 PROCEDURE — 250N000011 HC RX IP 250 OP 636: Performed by: INTERNAL MEDICINE

## 2022-07-26 PROCEDURE — 36591 DRAW BLOOD OFF VENOUS DEVICE: CPT | Performed by: INTERNAL MEDICINE

## 2022-07-26 PROCEDURE — 99215 OFFICE O/P EST HI 40 MIN: CPT | Performed by: INTERNAL MEDICINE

## 2022-07-26 PROCEDURE — 80053 COMPREHEN METABOLIC PANEL: CPT | Performed by: INTERNAL MEDICINE

## 2022-07-26 PROCEDURE — 85025 COMPLETE CBC W/AUTO DIFF WBC: CPT | Performed by: INTERNAL MEDICINE

## 2022-07-26 PROCEDURE — 80197 ASSAY OF TACROLIMUS: CPT | Performed by: INTERNAL MEDICINE

## 2022-07-26 RX ORDER — HEPARIN SODIUM (PORCINE) LOCK FLUSH IV SOLN 100 UNIT/ML 100 UNIT/ML
5 SOLUTION INTRAVENOUS ONCE
Status: COMPLETED | OUTPATIENT
Start: 2022-07-26 | End: 2022-07-26

## 2022-07-26 RX ORDER — AZITHROMYCIN 250 MG/1
250 TABLET, FILM COATED ORAL DAILY
Qty: 30 TABLET | Refills: 1 | Status: SHIPPED | OUTPATIENT
Start: 2022-07-26 | End: 2022-09-28

## 2022-07-26 RX ORDER — TACROLIMUS 1 MG/1
CAPSULE ORAL
Qty: 120 CAPSULE | Refills: 1 | Status: SHIPPED | OUTPATIENT
Start: 2022-07-26 | End: 2022-07-27

## 2022-07-26 RX ORDER — DEXAMETHASONE 0.5 MG/5ML
2 SOLUTION ORAL 4 TIMES DAILY
Qty: 1000 ML | Refills: 3 | Status: SHIPPED | OUTPATIENT
Start: 2022-07-26 | End: 2022-09-06

## 2022-07-26 RX ADMIN — Medication 5 ML: at 10:18

## 2022-07-26 ASSESSMENT — PAIN SCALES - GENERAL: PAINLEVEL: NO PAIN (0)

## 2022-07-26 NOTE — LETTER
7/26/2022         RE: Nik Nava  [address] [address] [address]        Dear Colleague,    Thank you for referring your patient, Nik Nava, to the [facility] [place]. Please see a copy of my visit note below.      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    HPI: Nik returns for follow up accompanied by his wife Lamar. Eyes are overall stable maybe better left eye down to 3-4 eyedrops, doing better on systane currently. He reports chewing more nicotine guys while at the cabin and noted an asymptomatic left buccal ulcer that is healing nonbleeding and nonpainful not affecting his intake.  No rashes or dry skin, abdominal pain, nausea/vomiting, good appetite, or diarrhea. No bleeding, no  concerns.      Review of Systems                                                                                                                                         10 point Review of systems was negative     PHYSICAL EXAM                                                                                                                                                 KPS: 80  /85   Pulse 70   Temp 97.7  F (36.5  C) (Oral)   Resp 16   Wt 108 kg (238 lb 3.2 oz)   SpO2 97%   BMI 31.43 kg/m      Wt Readings from Last 4 Encounters:   07/26/22 108 kg (238 lb 3.2 oz)   06/21/22 104.8 kg (231 lb)   06/01/22 103 kg (227 lb)   05/04/22 103 kg (227 lb)      General: NAD.  HEENT: sclera anicteric. Oropharynx with very mild lichenoid changes on bilateral inner cheeks, and left buccal ulcer.  No ulcerations.  Lungs:  CTA b/l  no wheezes  CV: RRR  no gallop  Abd; soft no tenderness; BS nl   Ext/ Skin: Hyperpigmentation noted on torso, back and anterior pelvis.  LE Trace edema, no skin thickening.    cGVHD therapy started on February 24 2022  - Baseline NIH scoring  2/24/2022 : skin 2 maculopapular rash/erythema with no sclerotic features, mouth score 1 mild symptoms with lichenoid changes less than 25%, eyes score 2 moderate symptoms without new visual impairments due to KCS requiring lubricant eyedrops more than 3 times a day, overall mild scale for her provider  - 3/25/2022 1 month NIH treatment assessment on PQRST: skin 2, mouth score 1 mild symptoms with NO lichenoid changes, eyes score 2 moderate symptoms w requiring lubricant eyedrops more than 3 times a day, liver score 1 ALT 3.7x ULN and nl bilirubin   - 3/31  Mouth clear; eyes minimal LFT still abn; no joint or new GI sx  - 4/28 Mouth clear, Left>R eye is mild sclera erythema, lids are pink and irritated appearing, LFT's slow improvement, no new joint, skin, or GI symptoms.   -5/11 mouth with mild erythema but no lichenoid changes, eyes using eyedrops 4-6 times a day score of 2, LFTs significantly improved from baseline slight elevation in alk phos and AST with normal bilirubin, skin with hyperpigmentation from old acute GVHD no active skin rashes, no GI symptoms no musculoskeletal complaints.  -5/26 Mouth with very slight lichenoid changes, erythema.  Eyes still using eyedrops 4-6x per day.  LFTs essentially normal with slight elevation in alk phos.  Skin with hyperpigmentation from old acute GVHD, no active rashes, no GI or MSK concerns.  Skin 0, mouth 0 but with lichenoid changes, eyes 2  -6/7/22 Mouth with no lichenoid changes, erythema.  Eyes still using eyedrops 4-6x per day.  LFTs essentially normal with slight elevation in alk phos.  Skin with hyperpigmentation from old acute GVHD, no active rashes, no GI or MSK concerns.  Skin 0, mouth 0, eyes 2    ASSESSMENT AND PLAN   Nik Nava is a 61 year old male with Ph Pos ALL, day 350 s/p sib allo stem cell transplant    Day -6 (8/4): flu/cy  Day -5 through day -2 (8/5-8/8): flu  Day -1 (8/9): TBI  Day 0 (8/10): transplant     1.  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 S/p allo sib PBSCT. (ABO matched)  HCT-CI score: 3 (prior solid tumor)  Day +100 bone marrow biopsy is 100% donor with no morphologic or flow cytometric evidence of leukemia BCR abl is pending.  - Day +180 restaging BM bx- still in CR  100% donor;  2/16/22 BCR ABL major breakpoint undetectable.     2.  HEME:  - Transfuse for hgb <7g/dL; plt < 10k.  No transfusions needs  - Pulmonary emboli: He developed a pulmonary emboli in the setting of receiving PEG asparaginase (ie provoked thrombus).  Xarelto complete.   - Counts are tolerating valcyte well.     3.  FEN/Renal:  - Cr improved.   - Magnesium WNL today, was hypomag likely due to tac.  Continue PO magnesium supplementation for now  - He has a history of renal cancer and resection. Has a single kidney.    4.    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   #Skin GVHD- history of biopsy proven GVHD of the skin 8/30/2021; resolved.   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 Mild flares with transaminitis  # Ocular GVHD: follows with ophthalmology. Maxitrol to lids, continue refreshing drops QID. Score 1-2  # Oral GVHD: dex s/s three times a day; tac ointment to lips as needed.   - Cont tac to 1mg BID. previously decided to stay on same dose, no checks of levels if clinically stable, and no need switch to sirolimus. (keep tac low as gvhd is quiet and viremia). 5/10 level 45 with starting of COVID therapy and D-D intractions (Paxlovid for COVID19, which has a drug interaction with tacrolimus-Ritonavir increases serum levels of tacrolimus). On tacro 2/1.5 MG PO currently, level today pending     3/1-3/16: prednisone 100mg every day  3/17 75mg every day   3/31 75 alt with 50  4/6: 75 alt with 25mg every other day  4/12 pred tapered to 60 alternating with 25mg   4/15 In setting of viruses trying to reactivate,GVHD stable: Taper 60/15mg  alternating.  4/20 decrease pred further to 50/10.    4/25 taper pred to 50/0 every other day as long as symptoms stable.   5/2 Pred decrease 40/0 alternating every other day.   5/13 decrease to 30/0  5/20 decrease to 20/0 then slowly by 5 mg weekly  6/7 decrease to 10/0  Went up to 15/0 with mild increased LFTs      5.  ID: Afebrile   #COVID   Positive via home test 5/8. Treated with Paxlovid x 7 days with resolution of symptoms.  Had recurrence on 5/18.  Now asymptomatic.    #Pulmonary infiltrates:   4/20 CT chest with new RUL infiltrate. Highly immunocompromised. 4/22 Fungitell/asp GM were neg. BAL 4/29 NGTD, increased micafungin to 150mg IV daily-- f/u 6/1 Dr Garcia CT with mixed results will continue francisco javier for 1 month and recheck and follow up with him.     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 4/15 started valcyte 900mg PO BID. 4/20 CMV <137, 5/10 ND, decreased to 450mg BID 4/30 - continue 4 weeks as long as CMV <137 till 5/28 + weekly CMV  - Switch to acyclovir after completion of current therapy 5/28. 5/26 CMV ND.    - PPx: ACV, micafungin 150mg daily. Pentamidine given 5/13/2022 did not tolerate atovaqone will reschedule on 7/7. Azithro 250mg daily (stopped levaquin due to possible tendonitis).   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 S/p 4/22 and 5/4/22 rituximab 375mg/m2 5/10 ND x2, 6/7 ND  S/p covid vaccine series 12/2021  S/p evusheld    6. Endo: Hx of graves disease; On synthroid 175 mcg daily. TSH normal 4/6/2022 no reflex to T4.     7. CV: Norvasc: 2.5mg.    8. GI:   Omeprazole for heartburn  LFTs as above-- improving.     9. Psych:   - Situational anxiety - lexapro 10mg daily.   - Insomnia: worse on steroids. Ativan, trazadone, melatonin    10. MSK: left food drop, PT    Plan:    - Keep prednisone to 15/0, ursodiol BID, will increase tacrolimus to allow for steroid sparing effect.   - Follow up with Dr. Garcia.  - RTC with me on 8/23 and prior labs        Again, thank you for  allowing me to participate in the care of your patient.      Sincerely,    Akshat Tobar MD

## 2022-07-26 NOTE — NURSING NOTE
Chief Complaint   Patient presents with     Oncology Clinic Visit     Rtn ALL; Follow Up     Port Draw     Port accessed with 20g flat needle by RN, labs collected, line flushed with saline and heparin.  Vitals taken. Pt checked in for appointment(s).      Anila LARA RN PHN BSN  BMT/Oncology Lab

## 2022-07-26 NOTE — NURSING NOTE
"Oncology Rooming Note    July 26, 2022 10:42 AM   Nik Nava is a 62 year old male who presents for:    Chief Complaint   Patient presents with     Oncology Clinic Visit     Rtn ALL; Follow Up     Port Draw     Port accessed with 20g flat needle by RN, labs collected, line flushed with saline and heparin.  Vitals taken. Pt checked in for appointment(s).      Initial Vitals: /85   Pulse 70   Temp 97.7  F (36.5  C) (Oral)   Resp 16   Wt 108 kg (238 lb 3.2 oz)   SpO2 97%   BMI 31.43 kg/m   Estimated body mass index is 31.43 kg/m  as calculated from the following:    Height as of 4/29/22: 1.854 m (6' 1\").    Weight as of this encounter: 108 kg (238 lb 3.2 oz). Body surface area is 2.36 meters squared.  No Pain (0)   No LMP for male patient.  Allergies reviewed: Yes  Medications reviewed: Yes    Medications: Tacrolimus 1mg and Azithromycin  Pharmacy name entered into [facility]:    [facility] [place] - [address] [place] [place] - [id number]    Clinical concerns: Pt has no concerns for their provider on July 26, 2022 and would like to discuss the original chief complaint of the appointment. Pt would like a check up on his eyes.     Joann Davis, EMT on July 26, 2022 at 10:43 AM            "

## 2022-07-26 NOTE — PROGRESS NOTES
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    HPI: Nik returns for follow up accompanied by his wife Lamar. Eyes are overall stable maybe better left eye down to 3-4 eyedrops, doing better on systane currently. He reports chewing more nicotine guys while at the cabin and noted an asymptomatic left buccal ulcer that is healing nonbleeding and nonpainful not affecting his intake.  No rashes or dry skin, abdominal pain, nausea/vomiting, good appetite, or diarrhea. No bleeding, no  concerns.      Review of Systems                                                                                                                                         10 point Review of systems was negative     PHYSICAL EXAM                                                                                                                                                 KPS: 80  /85   Pulse 70   Temp 97.7  F (36.5  C) (Oral)   Resp 16   Wt 108 kg (238 lb 3.2 oz)   SpO2 97%   BMI 31.43 kg/m      Wt Readings from Last 4 Encounters:   07/26/22 108 kg (238 lb 3.2 oz)   06/21/22 104.8 kg (231 lb)   06/01/22 103 kg (227 lb)   05/04/22 103 kg (227 lb)      General: NAD.  HEENT: sclera anicteric. Oropharynx with very mild lichenoid changes on bilateral inner cheeks, and left buccal ulcer.  No ulcerations.  Lungs:  CTA b/l  no wheezes  CV: RRR  no gallop  Abd; soft no tenderness; BS nl   Ext/ Skin: Hyperpigmentation noted on torso, back and anterior pelvis.  LE Trace edema, no skin thickening.    cGVHD therapy started on February 24 2022  - Baseline NIH scoring 2/24/2022 : skin 2 maculopapular rash/erythema with no sclerotic features, mouth score 1 mild symptoms with lichenoid changes less than 25%, eyes score 2 moderate symptoms without new visual impairments due to KCS requiring lubricant eyedrops more than 3 times a day, overall mild scale  for her provider  - 3/25/2022 1 month [facility] treatment assessment on PQRST: skin 2, mouth score 1 mild symptoms with NO lichenoid changes, eyes score 2 moderate symptoms w requiring lubricant eyedrops more than 3 times a day, liver score 1 ALT 3.7x ULN and nl bilirubin   - 3/31  Mouth clear; eyes minimal LFT still abn; no joint or new GI sx  - 4/28 Mouth clear, Left>R eye is mild sclera erythema, lids are pink and irritated appearing, LFT's slow improvement, no new joint, skin, or GI symptoms.   -5/11 mouth with mild erythema but no lichenoid changes, eyes using eyedrops 4-6 times a day score of 2, LFTs significantly improved from baseline slight elevation in alk phos and AST with normal bilirubin, skin with hyperpigmentation from old acute GVHD no active skin rashes, no GI symptoms no musculoskeletal complaints.  -5/26 Mouth with very slight lichenoid changes, erythema.  Eyes still using eyedrops 4-6x per day.  LFTs essentially normal with slight elevation in alk phos.  Skin with hyperpigmentation from old acute GVHD, no active rashes, no GI or MSK concerns.  Skin 0, mouth 0 but with lichenoid changes, eyes 2  -6/7/22 Mouth with no lichenoid changes, erythema.  Eyes still using eyedrops 4-6x per day.  LFTs essentially normal with slight elevation in alk phos.  Skin with hyperpigmentation from old acute GVHD, no active rashes, no GI or MSK concerns.  Skin 0, mouth 0, eyes 2    ASSESSMENT AND PLAN   Nik Nava is a 61 year old male with Ph Pos ALL, day 350 s/p sib allo stem cell transplant    Day -6 (8/4): flu/cy  Day -5 through day -2 (8/5-8/8): flu  Day -1 (8/9): TBI  Day 0 (8/10): transplant     1.  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 S/p allo sib PBSCT. (ABO matched)  HCT-CI score: 3 (prior solid tumor)  Day +100 bone marrow biopsy is 100% donor with no morphologic or flow cytometric evidence of leukemia BCR abl is pending.  - Day +180 restaging BM bx- still in CR   100% donor;  2/16/22 BCR ABL major breakpoint undetectable.     2.  HEME:  - Transfuse for hgb <7g/dL; plt < 10k.  No transfusions needs  - Pulmonary emboli: He developed a pulmonary emboli in the setting of receiving PEG asparaginase (ie provoked thrombus).  Xarelto complete.   - Counts are tolerating valcyte well.     3.  FEN/Renal:  - Cr improved.   - Magnesium WNL today, was hypomag likely due to tac.  Continue PO magnesium supplementation for now  - He has a history of renal cancer and resection. Has a single kidney.    4.    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   #Skin GVHD- history of biopsy proven GVHD of the skin 8/30/2021; resolved.   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 Mild flares with transaminitis  # Ocular GVHD: follows with ophthalmology. Maxitrol to lids, continue refreshing drops QID. Score 1-2  # Oral GVHD: dex s/s three times a day; tac ointment to lips as needed.   - Cont tac to 1mg BID. previously decided to stay on same dose, no checks of levels if clinically stable, and no need switch to sirolimus. (keep tac low as gvhd is quiet and viremia). 5/10 level 45 with starting of COVID therapy and D-D intractions (Paxlovid for COVID19, which has a drug interaction with tacrolimus-Ritonavir increases serum levels of tacrolimus). On tacro 2/1.5 MG PO currently, level today pending     3/1-3/16: prednisone 100mg every day  3/17 75mg every day   3/31 75 alt with 50  4/6: 75 alt with 25mg every other day  4/12 pred tapered to 60 alternating with 25mg   4/15 In setting of viruses trying to reactivate,GVHD stable: Taper 60/15mg alternating.  4/20 decrease pred further to 50/10.    4/25 taper pred to 50/0 every other day as long as symptoms stable.   5/2 Pred decrease 40/0 alternating every other day.   5/13 decrease to 30/0  5/20 decrease to 20/0 then slowly by 5 mg weekly  6/7 decrease to 10/0  Went up to 15/0 with mild  increased LFTs      5.  ID: Afebrile   #COVID   Positive via home test 5/8. Treated with Paxlovid x 7 days with resolution of symptoms.  Had recurrence on 5/18.  Now asymptomatic.    #Pulmonary infiltrates:   4/20 CT chest with new RUL infiltrate. Highly immunocompromised. 4/22 Fungitell/asp GM were neg. BAL 4/29 NGTD, increased micafungin to 150mg IV daily-- f/u 6/1 Dr Garcia CT with mixed results will continue francisco javier for 1 month and recheck and follow up with him.     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 4/15 started valcyte 900mg PO BID. 4/20 CMV <137, 5/10 ND, decreased to 450mg BID 4/30 - continue 4 weeks as long as CMV <137 till 5/28 + weekly CMV  - Switch to acyclovir after completion of current therapy 5/28. 5/26 CMV ND.    - PPx: ACV, micafungin 150mg daily. Pentamidine given 5/13/2022 did not tolerate atovaqone will reschedule on 7/7. Azithro 250mg daily (stopped levaquin due to possible tendonitis).   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 S/p 4/22 and 5/4/22 rituximab 375mg/m2 5/10 ND x2, 6/7 ND  S/p covid vaccine series 12/2021  S/p evusheld    6. Endo: Hx of graves disease; On synthroid 175 mcg daily. TSH normal 4/6/2022 no reflex to T4.     7. CV: Norvasc: 2.5mg.    8. GI:   Omeprazole for heartburn  LFTs as above-- improving.     9. Psych:   - Situational anxiety - lexapro 10mg daily.   - Insomnia: worse on steroids. Ativan, trazadone, melatonin    10. MSK: left food drop, PT    Plan:    - Keep prednisone to 15/0, ursodiol BID, will increase tacrolimus to allow for steroid sparing effect.   - Follow up with Dr. Garcia.  - RTC with me on 8/23 and prior labs

## 2022-07-27 ENCOUNTER — TELEPHONE (OUTPATIENT)
Dept: PHARMACY | Facility: CLINIC | Age: 62
End: 2022-07-27

## 2022-07-27 DIAGNOSIS — Z94.81 STATUS POST BONE MARROW TRANSPLANT (H): ICD-10-CM

## 2022-07-27 DIAGNOSIS — C91.01 ACUTE LYMPHOBLASTIC LEUKEMIA (ALL) IN REMISSION (H): ICD-10-CM

## 2022-07-27 RX ORDER — TACROLIMUS 0.5 MG/1
CAPSULE ORAL
Qty: 60 CAPSULE | Refills: 1 | COMMUNITY
Start: 2022-07-27 | End: 2022-08-19

## 2022-07-27 RX ORDER — TACROLIMUS 1 MG/1
CAPSULE ORAL
Qty: 120 CAPSULE | Refills: 1 | COMMUNITY
Start: 2022-07-27 | End: 2022-08-19

## 2022-07-27 NOTE — TELEPHONE ENCOUNTER
I spoke with Nik Nava regarding tacrolimus level from BMT clinic visit dated 7/26, which resulted as 2.6 on 2mg AM/1.5mg PM. Per Dr Avila Tobar, he is to increase to 2.5mg AM/2mg PM. Plan to recheck level at next visit, pt was instructed to hold dose prior to labs. He repeated these directions to me and voiced his understanding.     Fortunato Santana, TannerD

## 2022-07-28 ENCOUNTER — HOME INFUSION (PRE-WILLOW HOME INFUSION) (OUTPATIENT)
Dept: PHARMACY | Facility: CLINIC | Age: 62
End: 2022-07-28

## 2022-07-29 ENCOUNTER — HOME INFUSION (PRE-WILLOW HOME INFUSION) (OUTPATIENT)
Dept: PHARMACY | Facility: CLINIC | Age: 62
End: 2022-07-29

## 2022-08-01 NOTE — PROGRESS NOTES
This is a recent snapshot of the patient's [facility] medical record.  For current drug dose and complete information and questions, call [phone] or In [facility] pool, fv home infusion ([id number])  CSN Number:  [id number]

## 2022-08-02 ENCOUNTER — HOME INFUSION (PRE-WILLOW HOME INFUSION) (OUTPATIENT)
Dept: PHARMACY | Facility: CLINIC | Age: 62
End: 2022-08-02

## 2022-08-02 NOTE — PROGRESS NOTES
This is a recent snapshot of the patient's [facility] medical record.  For current drug dose and complete information and questions, call [phone] or In Basket pool, fv home infusion ([id number])  CSN Number:  [id number]

## 2022-08-04 NOTE — PROGRESS NOTES
This is a recent snapshot of the patient's [facility] medical record.  For current drug dose and complete information and questions, call [phone] or In Basket pool, fv home infusion ([id number])  CSN Number:  [id number]

## 2022-08-05 ENCOUNTER — HOME INFUSION (PRE-WILLOW HOME INFUSION) (OUTPATIENT)
Dept: PHARMACY | Facility: CLINIC | Age: 62
End: 2022-08-05

## 2022-08-05 NOTE — PROGRESS NOTES
This is a recent snapshot of the patient's [facility] medical record.  For current drug dose and complete information and questions, call [phone] or In Basket pool, fv home infusion ([id number])  CSN Number:  [id number]

## 2022-08-09 ENCOUNTER — HOME INFUSION (PRE-WILLOW HOME INFUSION) (OUTPATIENT)
Dept: PHARMACY | Facility: CLINIC | Age: 62
End: 2022-08-09

## 2022-08-09 ENCOUNTER — VIRTUAL VISIT (OUTPATIENT)
Dept: TRANSPLANT | Facility: CLINIC | Age: 62
End: 2022-08-09
Attending: INTERNAL MEDICINE
Payer: COMMERCIAL

## 2022-08-09 DIAGNOSIS — Z53.9 ERRONEOUS ENCOUNTER--DISREGARD: ICD-10-CM

## 2022-08-09 DIAGNOSIS — Z94.81 STATUS POST BONE MARROW TRANSPLANT (H): Primary | ICD-10-CM

## 2022-08-09 ASSESSMENT — ENCOUNTER SYMPTOMS
SPEECH CHANGE: 0
TROUBLE SWALLOWING: 0
BLOOD IN STOOL: 0
WEAKNESS: 0
ABDOMINAL PAIN: 0
BOWEL INCONTINENCE: 0
TINGLING: 1
NAUSEA: 0
HEARTBURN: 0
EYE WATERING: 1
PARALYSIS: 0
CONSTIPATION: 0
NECK MASS: 0
DISTURBANCES IN COORDINATION: 0
EYE PAIN: 1
DIZZINESS: 0
DIARRHEA: 0
EYE IRRITATION: 1
SINUS PAIN: 0
SMELL DISTURBANCE: 0
RECTAL PAIN: 0
VOMITING: 0
NUMBNESS: 1
SWOLLEN GLANDS: 0
BRUISES/BLEEDS EASILY: 1
EYE REDNESS: 1
DOUBLE VISION: 0
TREMORS: 0
JAUNDICE: 0
SINUS CONGESTION: 0
HOARSE VOICE: 0
MEMORY LOSS: 1
SORE THROAT: 0
HEADACHES: 0
SEIZURES: 0
LOSS OF CONSCIOUSNESS: 0
BLOATING: 1
TASTE DISTURBANCE: 0

## 2022-08-09 NOTE — PROGRESS NOTES
Patient was rescheduled. Technical difficulties with the video visit connection.  was contacted to reschedule.    Paulina Silva

## 2022-08-09 NOTE — LETTER
8/9/2022         RE: Nik Nava  [address] [address]        Dear Colleague,    Thank you for referring your patient, Nik Nava, to the [facility]LOOD AND MARROW TRANSPLANT PROGRAM [place]. Please see a copy of my visit note below.    Patient was rescheduled. Technical difficulties with the video visit connection.  was contacted to reschedule.    Paulina Silva      This encounter was opened in error. Please disregard.      Again, thank you for allowing me to participate in the care of your patient.      Sincerely,     BMT Transplant Late Effects

## 2022-08-09 NOTE — PROGRESS NOTES
This is a recent snapshot of the patient's [facility] medical record.  For current drug dose and complete information and questions, call [phone] or In Basket pool, fv home infusion ([id number])  CSN Number:  [id number]

## 2022-08-11 ENCOUNTER — HOME INFUSION (PRE-WILLOW HOME INFUSION) (OUTPATIENT)
Dept: PHARMACY | Facility: CLINIC | Age: 62
End: 2022-08-11

## 2022-08-16 ENCOUNTER — OFFICE VISIT (OUTPATIENT)
Dept: PULMONOLOGY | Facility: CLINIC | Age: 62
End: 2022-08-16
Attending: INTERNAL MEDICINE
Payer: COMMERCIAL

## 2022-08-16 ENCOUNTER — ANCILLARY PROCEDURE (OUTPATIENT)
Dept: CT IMAGING | Facility: CLINIC | Age: 62
End: 2022-08-16
Attending: INTERNAL MEDICINE
Payer: COMMERCIAL

## 2022-08-16 VITALS — OXYGEN SATURATION: 98 % | SYSTOLIC BLOOD PRESSURE: 150 MMHG | HEART RATE: 65 BPM | DIASTOLIC BLOOD PRESSURE: 88 MMHG

## 2022-08-16 DIAGNOSIS — B49 FUNGAL PNEUMONIA: ICD-10-CM

## 2022-08-16 DIAGNOSIS — J16.8 FUNGAL PNEUMONIA: Primary | ICD-10-CM

## 2022-08-16 DIAGNOSIS — J16.8 FUNGAL PNEUMONIA: ICD-10-CM

## 2022-08-16 DIAGNOSIS — B49 FUNGAL PNEUMONIA: Primary | ICD-10-CM

## 2022-08-16 PROCEDURE — 71250 CT THORAX DX C-: CPT | Mod: GC | Performed by: RADIOLOGY

## 2022-08-16 PROCEDURE — G0463 HOSPITAL OUTPT CLINIC VISIT: HCPCS

## 2022-08-16 PROCEDURE — 99213 OFFICE O/P EST LOW 20 MIN: CPT | Performed by: INTERNAL MEDICINE

## 2022-08-16 NOTE — NURSING NOTE
Chief Complaint   Patient presents with     Follow Up     BMT follow      Vitals were taken and medications were reconciled.   Jenny Thapa RMA  2:52 PM

## 2022-08-16 NOTE — PROGRESS NOTES
Reason for Visit  Nik Nava is a 62 year old year old male who is being seen for Follow Up (BMT follow )    Pulmonary HPI  61 YO with ALL s/p PBSCT in 8-21 (Day + 261) referred to the Pulmonary clinic by Dr. Hill for an evaluation of an abnormal chest CT.  Denies previous history of pulmonary disease. At present no cough, SOB or sputum production.  No fevers or chills.  Diagnosed with cGvHD of eyes, skin, GI tract and liver.  Was placed on high dose steroids (3-1-22) at 100 mg per day; since that time has been tapering down dose, currently on 50 mg every other day. Denies known mold exposure but did work in the past in a furniture delivery business, worked in a warehouse.  Not currently working.  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  Denies mold exposure at home.  Has cabin but has not been there this Winter.  No tobacco x 3 years, smoked since age 17, few packs per day.  FH- lung cancer in mother.    Last seen one month ago. Since his last visit he has been doing well.  No cough or SOB.  Still on prophylactic micafungin, hope to transition to oral antifungal once LFT's are better.  [place].  CT chest from today was personally reviewed- remaining areas of GGO have completely resolved, no new findings.      The patient was seen and examined by Murtaza Garcia MD           Current Outpatient Medications   Medication     acyclovir (ZOVIRAX) 800 MG tablet     amLODIPine (NORVASC) 2.5 MG tablet     azithromycin (ZITHROMAX) 250 MG tablet     Calcium Carb-Cholecalciferol 600-800 MG-UNIT CHEW     Carboxymethylcell-Glycerin PF (REFRESH RELIEVA PF) 0.5-1 % SOLN     dexamethasone alcohol-free (DECADRON) 0.1 MG/ML solution     fluorometholone (FML LIQUIFILM) 0.1 % ophthalmic suspension     levothyroxine (SYNTHROID/LEVOTHROID) 175 MCG tablet     lifitegrast (XIIDRA) 5 % opthalmic solution     LORazepam (ATIVAN) 1 MG tablet     magnesium oxide (MAG-OX) 400 MG tablet     melatonin 5 MG CAPS      micafungin (MYCAMINE) 50 MG injection     neomycin-polymyxin-dexamethasone (MAXITROL) 3.5-92542-3.1 ophthalmic ointment     nicotine polacrilex (NICORETTE) 4 MG gum     omeprazole (PRILOSEC) 40 MG DR capsule     predniSONE (DELTASONE) 10 MG tablet     predniSONE (DELTASONE) 20 MG tablet     predniSONE (DELTASONE) 50 MG tablet     predniSONE (DELTASONE) 50 MG tablet     sennosides (SENOKOT) 8.6 MG tablet     tacrolimus (GENERIC EQUIVALENT) 0.5 MG capsule     tacrolimus (GENERIC EQUIVALENT) 1 MG capsule     tacrolimus (PROTOPIC) 0.1 % external ointment     traZODone (DESYREL) 50 MG tablet     ursodiol (ACTIGALL) 300 MG capsule     No current facility-administered medications for this visit.     Allergies   Allergen Reactions     Sulfamethoxazole W/Trimethoprim Rash     Past Medical History:   Diagnosis Date     Cancer (H)     renal cell     CKD (chronic kidney disease)      Thyroid disease        Past Surgical History:   Procedure Laterality Date     BACK SURGERY  2017     BONE MARROW BIOPSY, BONE SPECIMEN, NEEDLE/TROCAR Left 9/2/2021    Procedure: BIOPSY, BONE MARROW;  Surgeon: Jailyn Ny APRN CNP;  Location: [facility] OR     BONE MARROW BIOPSY, BONE SPECIMEN, NEEDLE/TROCAR Left 11/15/2021    Procedure: BIOPSY, BONE MARROW;  Surgeon: Socrates De La Torre;  Location: [facility] OR     BONE MARROW BIOPSY, BONE SPECIMEN, NEEDLE/TROCAR Left 2/7/2022    Procedure: BIOPSY, BONE MARROW;  Surgeon: Renetta Wiggins PA-C;  [facility]     BRONCHOSCOPY (RIGID OR FLEXIBLE), DIAGNOSTIC N/A 4/29/2022    Procedure: BRONCHOSCOPY, WITH BRONCHOALVEOLAR LAVAGE;  Surgeon: Murtaza Garcia MD;  Location: [facility] GI     IR CVC TUNNEL PLACEMENT > 5 YRS OF AGE  8/4/2021     IR CVC TUNNEL REMOVAL LEFT  9/2/2021     IR LIVER BIOPSY PERCUTANEOUS  2/21/2022     PERCUTANEOUS BIOPSY LIVER N/A 2/21/2022    Procedure: NEEDLE BIOPSY, LIVER, PERCUTANEOUS;  Surgeon: Trace Castellanos MD;  [facility]: [facility]       Social History      Socioeconomic History     Marital status:      Spouse name: Not on file     Number of children: Not on file     Years of education: Not on file     Highest education level: Not on file   Occupational History     Not on file   Tobacco Use     Smoking status: Former Smoker     Packs/day: 2.00     Years: 30.00     Pack years: 60.00     Quit date: 5/4/2019     Years since quitting: 3.2     Smokeless tobacco: Never Used   Substance and Sexual Activity     Alcohol use: Not Currently     Drug use: Never     Sexual activity: Not on file   Other Topics Concern     Parent/sibling w/ CABG, MI or angioplasty before 65F 55M? Not Asked   Social History Narrative     Not on file     Social Determinants of Health     Financial Resource Strain: Not on file   Food Insecurity: Not on file   Transportation Needs: Not on file   Physical Activity: Not on file   Stress: Not on file   Social Connections: Not on file   Intimate Partner Violence: Not on file   Housing Stability: Not on file       ROS Pulmonary  A complete ROS was otherwise negative except as noted in the HPI.  There were no vitals taken for this visit.  Exam:   GENERAL APPEARANCE: Well developed, well nourished, alert, and in no apparent distress.  EYES: PERRL, EOMI  HENT: Nasal mucosa with no edema and no hyperemia. No nasal polyps.  EARS: Canals clear, TMs normal  MOUTH: Oral mucosa is moist, without any lesions, no tonsillar enlargement, no oropharyngeal exudate.  NECK: supple, no masses, no thyromegaly.  LYMPHATICS: No significant axillary, cervical, or supraclavicular nodes.  RESP:  Good air flow throughout.  No crackles. No rhonchi. No wheezes.  CV: Normal S1, S2, regular rhythm, normal rate. No murmur.  No rub. No gallop. No LE edema.   ABDOMEN:  Bowel sounds normal, soft, nontender, no HSM or masses.   MS: extremities normal. No clubbing. No cyanosis.  SKIN: no rash on limited exam  NEURO: Mentation intact, speech normal, normal strength and tone, normal  gait and stance  PSYCH: mentation appears normal. and affect normal/bright  Results:  No results found for this or any previous visit (from the past 168 hour(s)).    Assessment and plan:   63 YO with ALL s/p BMT complicated by cGvHD requiring high dose steroids.  Now with nodular infiltrate on CT concerning for an atypical infection (Fungal, Nocardia, or atypical Mycobacteria).  Needed further invasive testing to attempt to identify a pathogen.  Follow-up CT showed mixed results- some areas are better, some worse.  COVID19 since his last visit, new findings on that CT may be secondary to COVID versus continued fungal infection.  CT today with complete resolution of upper lobe GGO.  All resolved with antifungal treatment with no recurrence on prophylactic therapy only.        RTC prn.  Patient advised to contact the clinic with any questions or concerns prior to his next clinic visit      Answers for HPI/ROS submitted by the patient on 8/9/2022  General Symptoms: No  Skin Symptoms: No  HENT Symptoms: Yes  EYE SYMPTOMS: Yes  HEART SYMPTOMS: No  LUNG SYMPTOMS: No  INTESTINAL SYMPTOMS: Yes  URINARY SYMPTOMS: No  REPRODUCTIVE SYMPTOMS: No  SKELETAL SYMPTOMS: No  BLOOD SYMPTOMS: Yes  NERVOUS SYSTEM SYMPTOMS: Yes  MENTAL HEALTH SYMPTOMS: No  Ear pain: No  Ear discharge: No  Hearing loss: No  Tinnitus: No  Nosebleeds: No  Congestion: No  Sinus pain: No  Trouble swallowing: No   Voice hoarseness: No  Mouth sores: Yes  Sore throat: No  Tooth pain: No  Gum tenderness: Yes  Bleeding gums: No  Change in taste: No  Change in sense of smell: No  Dry mouth: Yes  Hearing aid used: No  Neck lump: No  Eye pain: Yes  Vision loss: Yes  Dry eyes: Yes  Watery eyes: Yes  Eye bulging: No  Double vision: No  Flashing of lights: No  Spots: No  Floaters: No  Redness: Yes  Crossed eyes: No  Tunnel Vision: No  Yellowing of eyes: No  Eye irritation: Yes  Heart burn or indigestion: No  Nausea: No  Vomiting: No  Abdominal pain: No  Bloating:  Yes  Constipation: No  Diarrhea: No  Blood in stool: No  Black stools: No  Rectal or Anal pain: No  Fecal incontinence: No  Yellowing of skin or eyes: No  Vomit with blood: No  Change in stools: No  Anemia: No  Swollen glands: No  Easy bleeding or bruising: Yes  Edema or swelling: Yes  Trouble with coordination: No  Dizziness or trouble with balance: No  Fainting or black-out spells: No  Memory loss: Yes  Headache: No  Seizures: No  Speech problems: No  Tingling: Yes  Tremor: No  Weakness: No  Difficulty walking: No  Paralysis: No  Numbness: Yes

## 2022-08-16 NOTE — LETTER
8/16/2022         RE: Nik Nava  [address] [facility] [id number]        Dear Colleague,    Thank you for referring your patient, Nik Nava, to the [facility] FOR LUNG SCIENCE AND [facility] [place]. Please see a copy of my visit note below.    Reason for Visit  Nik Nava is a 62 year old year old male who is being seen for Follow Up (BMT follow )    Pulmonary HPI  63 YO with ALL s/p PBSCT in 8-21 (Day + 261) referred to the Pulmonary clinic by Dr. Hill for an evaluation of an abnormal chest CT.  Denies previous history of pulmonary disease. At present no cough, SOB or sputum production.  No fevers or chills.  Diagnosed with cGvHD of eyes, skin, GI tract and liver.  Was placed on high dose steroids (3-1-22) at 100 mg per day; since that time has been tapering down dose, currently on 50 mg every other day. Denies known mold exposure but did work in the past in a furniture delivery business, worked in a warehouse.  Not currently working.  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  Denies mold exposure at home.  Has cabin but has not been there this Winter.  No tobacco x 3 years, smoked since age 17, few packs per day.  FH- lung cancer in mother.    Last seen one month ago. Since his last visit he has been doing well.  No cough or SOB.  Still on prophylactic micafungin, hope to transition to oral antifungal once LFT's are better.  [place].  CT chest from today was personally reviewed- remaining areas of GGO have completely resolved, no new findings.      The patient was seen and examined by Murtaza Garcia MD           Current Outpatient Medications   Medication     acyclovir (ZOVIRAX) 800 MG tablet     amLODIPine (NORVASC) 2.5 MG tablet     azithromycin (ZITHROMAX) 250 MG tablet     Calcium Carb-Cholecalciferol 600-800 MG-UNIT CHEW     Carboxymethylcell-Glycerin PF (REFRESH RELIEVA PF) 0.5-1 % SOLN     dexamethasone alcohol-free (DECADRON) 0.1  MG/ML solution     fluorometholone (FML LIQUIFILM) 0.1 % ophthalmic suspension     levothyroxine (SYNTHROID/LEVOTHROID) 175 MCG tablet     lifitegrast (XIIDRA) 5 % opthalmic solution     LORazepam (ATIVAN) 1 MG tablet     magnesium oxide (MAG-OX) 400 MG tablet     melatonin 5 MG CAPS     micafungin (MYCAMINE) 50 MG injection     neomycin-polymyxin-dexamethasone (MAXITROL) 3.5-12562-5.1 ophthalmic ointment     nicotine polacrilex (NICORETTE) 4 MG gum     omeprazole (PRILOSEC) 40 MG DR capsule     predniSONE (DELTASONE) 10 MG tablet     predniSONE (DELTASONE) 20 MG tablet     predniSONE (DELTASONE) 50 MG tablet     predniSONE (DELTASONE) 50 MG tablet     sennosides (SENOKOT) 8.6 MG tablet     tacrolimus (GENERIC EQUIVALENT) 0.5 MG capsule     tacrolimus (GENERIC EQUIVALENT) 1 MG capsule     tacrolimus (PROTOPIC) 0.1 % external ointment     traZODone (DESYREL) 50 MG tablet     ursodiol (ACTIGALL) 300 MG capsule     No current facility-administered medications for this visit.     Allergies   Allergen Reactions     Sulfamethoxazole W/Trimethoprim Rash     Past Medical History:   Diagnosis Date     Cancer (H)     renal cell     CKD (chronic kidney disease)      Thyroid disease        Past Surgical History:   Procedure Laterality Date     BACK SURGERY  2017     BONE MARROW BIOPSY, BONE SPECIMEN, NEEDLE/TROCAR Left 9/2/2021    Procedure: BIOPSY, BONE MARROW;  Surgeon: Jailyn Ny APRN CNP;  Location: [facility] OR     BONE MARROW BIOPSY, BONE SPECIMEN, NEEDLE/TROCAR Left 11/15/2021    Procedure: BIOPSY, BONE MARROW;  Surgeon: Socrates De La Torre;  Location: [facility] OR     BONE MARROW BIOPSY, BONE SPECIMEN, NEEDLE/TROCAR Left 2/7/2022    Procedure: BIOPSY, BONE MARROW;  Surgeon: Renetta Wiggins PA-C;  Location: [facility] OR     BRONCHOSCOPY (RIGID OR FLEXIBLE), DIAGNOSTIC N/A 4/29/2022    Procedure: BRONCHOSCOPY, WITH BRONCHOALVEOLAR LAVAGE;  Surgeon: Murtaza Garcia MD;  Location: [facility]     IR CVC TUNNEL PLACEMENT >  5 YRS OF AGE  8/4/2021     IR CVC TUNNEL REMOVAL LEFT  9/2/2021     IR LIVER BIOPSY PERCUTANEOUS  2/21/2022     PERCUTANEOUS BIOPSY LIVER N/A 2/21/2022    Procedure: NEEDLE BIOPSY, LIVER, PERCUTANEOUS;  Surgeon: Trace Castellanos MD;  [place] [facility]       Social History     Socioeconomic History     Marital status:      Spouse name: Not on file     Number of children: Not on file     Years of education: Not on file     Highest education level: Not on file   Occupational History     Not on file   Tobacco Use     Smoking status: Former Smoker     Packs/day: 2.00     Years: 30.00     Pack years: 60.00     Quit date: 5/4/2019     Years since quitting: 3.2     Smokeless tobacco: Never Used   Substance and Sexual Activity     Alcohol use: Not Currently     Drug use: Never     Sexual activity: Not on file   Other Topics Concern     Parent/sibling w/ CABG, MI or angioplasty before 65F 55M? Not Asked   Social History Narrative     Not on file     Social Determinants of Health     Financial Resource Strain: Not on file   Food Insecurity: Not on file   Transportation Needs: Not on file   Physical Activity: Not on file   Stress: Not on file   Social Connections: Not on file   Intimate Partner Violence: Not on file   Housing Stability: Not on file       ROS Pulmonary  A complete ROS was otherwise negative except as noted in the HPI.  There were no vitals taken for this visit.  Exam:   GENERAL APPEARANCE: Well developed, well nourished, alert, and in no apparent distress.  EYES: PERRL, EOMI  HENT: Nasal mucosa with no edema and no hyperemia. No nasal polyps.  EARS: Canals clear, TMs normal  MOUTH: Oral mucosa is moist, without any lesions, no tonsillar enlargement, no oropharyngeal exudate.  NECK: supple, no masses, no thyromegaly.  LYMPHATICS: No significant axillary, cervical, or supraclavicular nodes.  RESP:  Good air flow throughout.  No crackles. No rhonchi. No wheezes.  CV: Normal S1, S2, regular rhythm, normal  rate. No murmur.  No rub. No gallop. No LE edema.   ABDOMEN:  Bowel sounds normal, soft, nontender, no HSM or masses.   MS: extremities normal. No clubbing. No cyanosis.  SKIN: no rash on limited exam  NEURO: Mentation intact, speech normal, normal strength and tone, normal gait and stance  PSYCH: mentation appears normal. and affect normal/bright  Results:  No results found for this or any previous visit (from the past 168 hour(s)).    Assessment and plan:   63 YO with ALL s/p BMT complicated by cGvHD requiring high dose steroids.  Now with nodular infiltrate on CT concerning for an atypical infection (Fungal, Nocardia, or atypical Mycobacteria).  Needed further invasive testing to attempt to identify a pathogen.  Follow-up CT showed mixed results- some areas are better, some worse.  COVID19 since his last visit, new findings on that CT may be secondary to COVID versus continued fungal infection.  CT today with complete resolution of upper lobe GGO.  All resolved with antifungal treatment with no recurrence on prophylactic therapy only.        RTC prn.  Patient advised to contact the clinic with any questions or concerns prior to his next clinic visit        Again, thank you for allowing me to participate in the care of your patient.        Sincerely,        Murtaza Garcia MD

## 2022-08-17 ENCOUNTER — ANESTHESIA EVENT (OUTPATIENT)
Dept: SURGERY | Facility: AMBULATORY SURGERY CENTER | Age: 62
End: 2022-08-17
Payer: COMMERCIAL

## 2022-08-18 ENCOUNTER — ANESTHESIA (OUTPATIENT)
Dept: SURGERY | Facility: AMBULATORY SURGERY CENTER | Age: 62
End: 2022-08-18
Payer: COMMERCIAL

## 2022-08-18 ENCOUNTER — OFFICE VISIT (OUTPATIENT)
Dept: OPHTHALMOLOGY | Facility: CLINIC | Age: 62
End: 2022-08-18
Payer: COMMERCIAL

## 2022-08-18 ENCOUNTER — OFFICE VISIT (OUTPATIENT)
Dept: TRANSPLANT | Facility: CLINIC | Age: 62
End: 2022-08-18
Attending: PHYSICIAN ASSISTANT
Payer: COMMERCIAL

## 2022-08-18 ENCOUNTER — LAB (OUTPATIENT)
Dept: LAB | Facility: CLINIC | Age: 62
End: 2022-08-18
Attending: PHYSICIAN ASSISTANT
Payer: COMMERCIAL

## 2022-08-18 ENCOUNTER — ONCOLOGY SURVIVORSHIP VISIT (OUTPATIENT)
Dept: TRANSPLANT | Facility: CLINIC | Age: 62
End: 2022-08-18
Attending: NURSE PRACTITIONER
Payer: COMMERCIAL

## 2022-08-18 ENCOUNTER — HOSPITAL ENCOUNTER (OUTPATIENT)
Facility: AMBULATORY SURGERY CENTER | Age: 62
Discharge: HOME | End: 2022-08-18
Attending: PHYSICIAN ASSISTANT
Payer: COMMERCIAL

## 2022-08-18 VITALS
OXYGEN SATURATION: 99 % | HEART RATE: 66 BPM | SYSTOLIC BLOOD PRESSURE: 161 MMHG | WEIGHT: 248.8 LBS | DIASTOLIC BLOOD PRESSURE: 88 MMHG | RESPIRATION RATE: 16 BRPM | BODY MASS INDEX: 32.83 KG/M2 | TEMPERATURE: 97.7 F

## 2022-08-18 VITALS
BODY MASS INDEX: 32.65 KG/M2 | DIASTOLIC BLOOD PRESSURE: 92 MMHG | OXYGEN SATURATION: 99 % | WEIGHT: 247.5 LBS | RESPIRATION RATE: 16 BRPM | SYSTOLIC BLOOD PRESSURE: 145 MMHG | TEMPERATURE: 97.5 F | HEART RATE: 62 BPM

## 2022-08-18 VITALS
WEIGHT: 248 LBS | HEIGHT: 73 IN | SYSTOLIC BLOOD PRESSURE: 151 MMHG | RESPIRATION RATE: 16 BRPM | OXYGEN SATURATION: 99 % | DIASTOLIC BLOOD PRESSURE: 85 MMHG | HEART RATE: 55 BPM | BODY MASS INDEX: 32.87 KG/M2 | TEMPERATURE: 97 F

## 2022-08-18 DIAGNOSIS — C91.01 ACUTE LYMPHOBLASTIC LEUKEMIA (ALL) IN REMISSION (H): Primary | ICD-10-CM

## 2022-08-18 DIAGNOSIS — D89.813 GRAFT VS HOST DISEASE (H): ICD-10-CM

## 2022-08-18 DIAGNOSIS — R79.89 ELEVATED LIVER FUNCTION TESTS: ICD-10-CM

## 2022-08-18 DIAGNOSIS — T86.09 OTHER COMPLICATION OF BONE MARROW TRANSPLANT (H): ICD-10-CM

## 2022-08-18 DIAGNOSIS — Z91.89 AT HIGH RISK FOR OSTEOPOROSIS: ICD-10-CM

## 2022-08-18 DIAGNOSIS — Z91.89 AT INCREASED RISK FOR CARDIOVASCULAR DISEASE: ICD-10-CM

## 2022-08-18 DIAGNOSIS — Z92.89 HISTORY OF BLOOD PRODUCT TRANSFUSION: ICD-10-CM

## 2022-08-18 DIAGNOSIS — H16.123 FILAMENTARY KERATITIS OF BOTH EYES: ICD-10-CM

## 2022-08-18 DIAGNOSIS — T86.09 GVHD AS COMPLICATION OF BONE MARROW TRANSPLANT (H): ICD-10-CM

## 2022-08-18 DIAGNOSIS — Z94.81 STATUS POST BONE MARROW TRANSPLANT (H): ICD-10-CM

## 2022-08-18 DIAGNOSIS — Z92.241 HISTORY OF SYSTEMIC STEROID THERAPY: ICD-10-CM

## 2022-08-18 DIAGNOSIS — H04.129 DRY EYE: Primary | ICD-10-CM

## 2022-08-18 DIAGNOSIS — D89.813 GVHD AS COMPLICATION OF BONE MARROW TRANSPLANT (H): ICD-10-CM

## 2022-08-18 DIAGNOSIS — Z94.84 HISTORY OF PERIPHERAL STEM CELL TRANSPLANT (H): ICD-10-CM

## 2022-08-18 DIAGNOSIS — N52.9 ERECTILE DYSFUNCTION, UNSPECIFIED ERECTILE DYSFUNCTION TYPE: ICD-10-CM

## 2022-08-18 LAB
ALBUMIN SERPL-MCNC: 3.3 G/DL (ref 3.4–5)
ALP SERPL-CCNC: 112 U/L (ref 40–150)
ALT SERPL W P-5'-P-CCNC: 42 U/L (ref 0–70)
ANION GAP SERPL CALCULATED.3IONS-SCNC: 6 MMOL/L (ref 3–14)
AST SERPL W P-5'-P-CCNC: 41 U/L (ref 0–45)
BASOPHILS # BLD AUTO: 0 10E3/UL (ref 0–0.2)
BASOPHILS NFR BLD AUTO: 1 %
BILIRUB SERPL-MCNC: 0.6 MG/DL (ref 0.2–1.3)
BUN SERPL-MCNC: 38 MG/DL (ref 7–30)
CA-I BLD-MCNC: 5.1 MG/DL (ref 4.4–5.2)
CALCIUM SERPL-MCNC: 9.4 MG/DL (ref 8.5–10.1)
CHLORIDE BLD-SCNC: 105 MMOL/L (ref 94–109)
CHOLEST SERPL-MCNC: 290 MG/DL
CO2 SERPL-SCNC: 27 MMOL/L (ref 20–32)
CREAT SERPL-MCNC: 0.94 MG/DL (ref 0.66–1.25)
DEPRECATED CALCIDIOL+CALCIFEROL SERPL-MC: 36 UG/L (ref 20–75)
EOSINOPHIL # BLD AUTO: 0.1 10E3/UL (ref 0–0.7)
EOSINOPHIL NFR BLD AUTO: 1 %
ERYTHROCYTE [DISTWIDTH] IN BLOOD BY AUTOMATED COUNT: 14.8 % (ref 10–15)
FASTING STATUS PATIENT QL REPORTED: YES
FERRITIN SERPL-MCNC: 1023 NG/ML (ref 26–388)
GFR SERPL CREATININE-BSD FRML MDRD: >90 ML/MIN/1.73M2
GLUCOSE BLD-MCNC: 94 MG/DL (ref 70–99)
HCT VFR BLD AUTO: 40 % (ref 40–53)
HDLC SERPL-MCNC: 99 MG/DL
HGB BLD-MCNC: 13.6 G/DL (ref 13.3–17.7)
IMM GRANULOCYTES # BLD: 0.1 10E3/UL
IMM GRANULOCYTES NFR BLD: 1 %
LAB DIRECTOR COMMENTS: NORMAL
LAB DIRECTOR COMMENTS: NORMAL
LAB DIRECTOR DISCLAIMER: NORMAL
LAB DIRECTOR INTERPRETATION: NORMAL
LAB DIRECTOR METHODOLOGY: NORMAL
LAB DIRECTOR RESULTS: NORMAL
LDLC SERPL CALC-MCNC: 162 MG/DL
LYMPHOCYTES # BLD AUTO: 2.3 10E3/UL (ref 0.8–5.3)
LYMPHOCYTES NFR BLD AUTO: 34 %
MAGNESIUM SERPL-MCNC: 1.9 MG/DL (ref 1.6–2.3)
MCH RBC QN AUTO: 34.5 PG (ref 26.5–33)
MCHC RBC AUTO-ENTMCNC: 34 G/DL (ref 31.5–36.5)
MCV RBC AUTO: 102 FL (ref 78–100)
MONOCYTES # BLD AUTO: 0.9 10E3/UL (ref 0–1.3)
MONOCYTES NFR BLD AUTO: 13 %
NEUTROPHILS # BLD AUTO: 3.3 10E3/UL (ref 1.6–8.3)
NEUTROPHILS NFR BLD AUTO: 50 %
NONHDLC SERPL-MCNC: 191 MG/DL
NRBC # BLD AUTO: 0 10E3/UL
NRBC BLD AUTO-RTO: 0 /100
PLATELET # BLD AUTO: 138 10E3/UL (ref 150–450)
POTASSIUM BLD-SCNC: 4.2 MMOL/L (ref 3.4–5.3)
PROT SERPL-MCNC: 6.7 G/DL (ref 6.8–8.8)
RBC # BLD AUTO: 3.94 10E6/UL (ref 4.4–5.9)
SODIUM SERPL-SCNC: 138 MMOL/L (ref 133–144)
SPECIMEN DESCRIPTION: NORMAL
TACROLIMUS BLD-MCNC: 3.2 UG/L (ref 5–15)
TACROLIMUS BLD-MCNC: 3.5 UG/L (ref 5–15)
TME LAST DOSE: ABNORMAL H
TRIGL SERPL-MCNC: 146 MG/DL
WBC # BLD AUTO: 6.6 10E3/UL (ref 4–11)

## 2022-08-18 PROCEDURE — 88275 CYTOGENETICS 100-300: CPT | Performed by: INTERNAL MEDICINE

## 2022-08-18 PROCEDURE — 88305 TISSUE EXAM BY PATHOLOGIST: CPT | Mod: TC | Performed by: INTERNAL MEDICINE

## 2022-08-18 PROCEDURE — 81207 BCR/ABL1 GENE MINOR BP: CPT

## 2022-08-18 PROCEDURE — 85014 HEMATOCRIT: CPT | Performed by: INTERNAL MEDICINE

## 2022-08-18 PROCEDURE — 87799 DETECT AGENT NOS DNA QUANT: CPT

## 2022-08-18 PROCEDURE — 85048 AUTOMATED LEUKOCYTE COUNT: CPT | Performed by: INTERNAL MEDICINE

## 2022-08-18 PROCEDURE — 80197 ASSAY OF TACROLIMUS: CPT

## 2022-08-18 PROCEDURE — G0463 HOSPITAL OUTPT CLINIC VISIT: HCPCS

## 2022-08-18 PROCEDURE — 88264 CHROMOSOME ANALYSIS 20-25: CPT | Performed by: INTERNAL MEDICINE

## 2022-08-18 PROCEDURE — 99215 OFFICE O/P EST HI 40 MIN: CPT | Mod: 25

## 2022-08-18 PROCEDURE — 80061 LIPID PANEL: CPT

## 2022-08-18 PROCEDURE — 88237 TISSUE CULTURE BONE MARROW: CPT | Performed by: INTERNAL MEDICINE

## 2022-08-18 PROCEDURE — 36591 DRAW BLOOD OFF VENOUS DEVICE: CPT

## 2022-08-18 PROCEDURE — 84403 ASSAY OF TOTAL TESTOSTERONE: CPT

## 2022-08-18 PROCEDURE — 88341 IMHCHEM/IMCYTCHM EA ADD ANTB: CPT | Mod: 26 | Performed by: PATHOLOGY

## 2022-08-18 PROCEDURE — 80053 COMPREHEN METABOLIC PANEL: CPT | Performed by: INTERNAL MEDICINE

## 2022-08-18 PROCEDURE — 99213 OFFICE O/P EST LOW 20 MIN: CPT | Performed by: OPTOMETRIST

## 2022-08-18 PROCEDURE — 88311 DECALCIFY TISSUE: CPT | Mod: TC | Performed by: INTERNAL MEDICINE

## 2022-08-18 PROCEDURE — G0452 MOLECULAR PATHOLOGY INTERPR: HCPCS | Mod: 26 | Performed by: PATHOLOGY

## 2022-08-18 PROCEDURE — 81267 CHIMERISM ANAL NO CELL SELEC: CPT | Performed by: INTERNAL MEDICINE

## 2022-08-18 PROCEDURE — 250N000011 HC RX IP 250 OP 636: Performed by: PHYSICIAN ASSISTANT

## 2022-08-18 PROCEDURE — 81206 BCR/ABL1 GENE MAJOR BP: CPT

## 2022-08-18 PROCEDURE — 85097 BONE MARROW INTERPRETATION: CPT | Mod: GC | Performed by: PATHOLOGY

## 2022-08-18 PROCEDURE — 88342 IMHCHEM/IMCYTCHM 1ST ANTB: CPT | Mod: 26 | Performed by: PATHOLOGY

## 2022-08-18 PROCEDURE — 82728 ASSAY OF FERRITIN: CPT

## 2022-08-18 PROCEDURE — 82784 ASSAY IGA/IGD/IGG/IGM EACH: CPT | Performed by: INTERNAL MEDICINE

## 2022-08-18 PROCEDURE — 82330 ASSAY OF CALCIUM: CPT

## 2022-08-18 PROCEDURE — 88368 INSITU HYBRIDIZATION MANUAL: CPT | Mod: 26 | Performed by: MEDICAL GENETICS

## 2022-08-18 PROCEDURE — 82306 VITAMIN D 25 HYDROXY: CPT

## 2022-08-18 PROCEDURE — 88311 DECALCIFY TISSUE: CPT | Mod: 26 | Performed by: PATHOLOGY

## 2022-08-18 PROCEDURE — 88188 FLOWCYTOMETRY/READ 9-15: CPT | Mod: GC | Performed by: STUDENT IN AN ORGANIZED HEALTH CARE EDUCATION/TRAINING PROGRAM

## 2022-08-18 PROCEDURE — 36415 COLL VENOUS BLD VENIPUNCTURE: CPT

## 2022-08-18 PROCEDURE — 38222 DX BONE MARROW BX & ASPIR: CPT | Mod: LT

## 2022-08-18 PROCEDURE — 88305 TISSUE EXAM BY PATHOLOGIST: CPT | Mod: 26 | Performed by: PATHOLOGY

## 2022-08-18 PROCEDURE — 83735 ASSAY OF MAGNESIUM: CPT

## 2022-08-18 PROCEDURE — 88185 FLOWCYTOMETRY/TC ADD-ON: CPT | Performed by: INTERNAL MEDICINE

## 2022-08-18 PROCEDURE — 85060 BLOOD SMEAR INTERPRETATION: CPT | Mod: GC | Performed by: PATHOLOGY

## 2022-08-18 RX ORDER — ONDANSETRON 4 MG/1
4 TABLET, ORALLY DISINTEGRATING ORAL EVERY 30 MIN PRN
Status: DISCONTINUED | OUTPATIENT
Start: 2022-08-18 | End: 2022-08-19 | Stop reason: HOSPADM

## 2022-08-18 RX ORDER — OFLOXACIN 3 MG/ML
1 SOLUTION/ DROPS OPHTHALMIC DAILY
Qty: 5 ML | Refills: 1 | Status: SHIPPED | OUTPATIENT
Start: 2022-08-18 | End: 2022-12-01

## 2022-08-18 RX ORDER — ONDANSETRON 2 MG/ML
4 INJECTION INTRAMUSCULAR; INTRAVENOUS EVERY 30 MIN PRN
Status: DISCONTINUED | OUTPATIENT
Start: 2022-08-18 | End: 2022-08-19 | Stop reason: HOSPADM

## 2022-08-18 RX ORDER — HEPARIN SODIUM (PORCINE) LOCK FLUSH IV SOLN 100 UNIT/ML 100 UNIT/ML
5-10 SOLUTION INTRAVENOUS
Status: DISCONTINUED | OUTPATIENT
Start: 2022-08-18 | End: 2022-08-19 | Stop reason: HOSPADM

## 2022-08-18 RX ORDER — GABAPENTIN 300 MG/1
300 CAPSULE ORAL
Status: DISCONTINUED | OUTPATIENT
Start: 2022-08-18 | End: 2022-08-19 | Stop reason: HOSPADM

## 2022-08-18 RX ORDER — HALOPERIDOL 5 MG/ML
1 INJECTION INTRAMUSCULAR
Status: DISCONTINUED | OUTPATIENT
Start: 2022-08-18 | End: 2022-08-19 | Stop reason: HOSPADM

## 2022-08-18 RX ORDER — MAGNESIUM OXIDE 400 MG/1
800 TABLET ORAL 2 TIMES DAILY
Qty: 120 TABLET | Refills: 1 | COMMUNITY
Start: 2022-08-18 | End: 2022-12-06

## 2022-08-18 RX ORDER — HEPARIN SODIUM (PORCINE) LOCK FLUSH IV SOLN 100 UNIT/ML 100 UNIT/ML
500 SOLUTION INTRAVENOUS ONCE
Status: COMPLETED | OUTPATIENT
Start: 2022-08-18 | End: 2022-08-18

## 2022-08-18 RX ORDER — HYDROMORPHONE HYDROCHLORIDE 1 MG/ML
0.2 INJECTION, SOLUTION INTRAMUSCULAR; INTRAVENOUS; SUBCUTANEOUS EVERY 10 MIN PRN
Status: DISCONTINUED | OUTPATIENT
Start: 2022-08-18 | End: 2022-08-19 | Stop reason: HOSPADM

## 2022-08-18 RX ORDER — PENTAMIDINE ISETHIONATE 300 MG/300MG
300 INHALANT RESPIRATORY (INHALATION)
Status: DISCONTINUED | OUTPATIENT
Start: 2022-08-18 | End: 2022-08-18

## 2022-08-18 RX ORDER — LIDOCAINE 40 MG/G
CREAM TOPICAL
Status: DISCONTINUED | OUTPATIENT
Start: 2022-08-18 | End: 2022-08-19 | Stop reason: HOSPADM

## 2022-08-18 RX ORDER — MEPERIDINE HYDROCHLORIDE 25 MG/ML
12.5 INJECTION INTRAMUSCULAR; INTRAVENOUS; SUBCUTANEOUS
Status: DISCONTINUED | OUTPATIENT
Start: 2022-08-18 | End: 2022-08-19 | Stop reason: HOSPADM

## 2022-08-18 RX ORDER — PREDNISONE 10 MG/1
TABLET ORAL
COMMUNITY
Start: 2022-08-18 | End: 2022-10-21

## 2022-08-18 RX ORDER — ALBUTEROL SULFATE 0.83 MG/ML
2.5 SOLUTION RESPIRATORY (INHALATION) EVERY 4 HOURS PRN
Status: DISCONTINUED | OUTPATIENT
Start: 2022-08-18 | End: 2022-08-19 | Stop reason: HOSPADM

## 2022-08-18 RX ORDER — PROPOFOL 10 MG/ML
INJECTION, EMULSION INTRAVENOUS PRN
Status: DISCONTINUED | OUTPATIENT
Start: 2022-08-18 | End: 2022-08-18

## 2022-08-18 RX ORDER — ACETAMINOPHEN 325 MG/1
975 TABLET ORAL ONCE
Status: COMPLETED | OUTPATIENT
Start: 2022-08-18 | End: 2022-08-18

## 2022-08-18 RX ORDER — PROPOFOL 10 MG/ML
INJECTION, EMULSION INTRAVENOUS CONTINUOUS PRN
Status: DISCONTINUED | OUTPATIENT
Start: 2022-08-18 | End: 2022-08-18

## 2022-08-18 RX ORDER — HEPARIN SODIUM,PORCINE 10 UNIT/ML
5-10 VIAL (ML) INTRAVENOUS EVERY 24 HOURS
Status: DISCONTINUED | OUTPATIENT
Start: 2022-08-18 | End: 2022-08-19 | Stop reason: HOSPADM

## 2022-08-18 RX ORDER — HEPARIN SODIUM,PORCINE 10 UNIT/ML
5-10 VIAL (ML) INTRAVENOUS
Status: DISCONTINUED | OUTPATIENT
Start: 2022-08-18 | End: 2022-08-19 | Stop reason: HOSPADM

## 2022-08-18 RX ORDER — FLUOROMETHOLONE 0.1 %
1 SUSPENSION, DROPS(FINAL DOSAGE FORM)(ML) OPHTHALMIC (EYE) 2 TIMES DAILY
Qty: 5 ML | Refills: 2 | Status: SHIPPED | OUTPATIENT
Start: 2022-08-18 | End: 2022-12-01

## 2022-08-18 RX ORDER — LIDOCAINE HYDROCHLORIDE 10 MG/ML
8-10 INJECTION, SOLUTION EPIDURAL; INFILTRATION; INTRACAUDAL; PERINEURAL
Status: DISCONTINUED | OUTPATIENT
Start: 2022-08-18 | End: 2022-08-19 | Stop reason: HOSPADM

## 2022-08-18 RX ORDER — OXYCODONE HYDROCHLORIDE 5 MG/1
5 TABLET ORAL EVERY 4 HOURS PRN
Status: DISCONTINUED | OUTPATIENT
Start: 2022-08-18 | End: 2022-08-19 | Stop reason: HOSPADM

## 2022-08-18 RX ORDER — FENTANYL CITRATE 50 UG/ML
25 INJECTION, SOLUTION INTRAMUSCULAR; INTRAVENOUS EVERY 5 MIN PRN
Status: DISCONTINUED | OUTPATIENT
Start: 2022-08-18 | End: 2022-08-19 | Stop reason: HOSPADM

## 2022-08-18 RX ORDER — ALBUTEROL SULFATE 0.83 MG/ML
2.5 SOLUTION RESPIRATORY (INHALATION)
Status: DISCONTINUED | OUTPATIENT
Start: 2022-08-18 | End: 2022-08-18

## 2022-08-18 RX ORDER — SODIUM CHLORIDE, SODIUM LACTATE, POTASSIUM CHLORIDE, CALCIUM CHLORIDE 600; 310; 30; 20 MG/100ML; MG/100ML; MG/100ML; MG/100ML
INJECTION, SOLUTION INTRAVENOUS CONTINUOUS
Status: DISCONTINUED | OUTPATIENT
Start: 2022-08-18 | End: 2022-08-19 | Stop reason: HOSPADM

## 2022-08-18 RX ADMIN — HEPARIN SODIUM (PORCINE) LOCK FLUSH IV SOLN 100 UNIT/ML 5 ML: 100 SOLUTION at 13:36

## 2022-08-18 RX ADMIN — Medication 500 UNITS: at 10:33

## 2022-08-18 RX ADMIN — PROPOFOL 125 MCG/KG/MIN: 10 INJECTION, EMULSION INTRAVENOUS at 12:53

## 2022-08-18 RX ADMIN — ACETAMINOPHEN 975 MG: 325 TABLET ORAL at 12:18

## 2022-08-18 RX ADMIN — SODIUM CHLORIDE, SODIUM LACTATE, POTASSIUM CHLORIDE, CALCIUM CHLORIDE: 600; 310; 30; 20 INJECTION, SOLUTION INTRAVENOUS at 12:49

## 2022-08-18 RX ADMIN — PROPOFOL 50 MG: 10 INJECTION, EMULSION INTRAVENOUS at 12:55

## 2022-08-18 ASSESSMENT — PAIN SCALES - GENERAL
PAINLEVEL: NO PAIN (0)
PAINLEVEL: NO PAIN (1)

## 2022-08-18 ASSESSMENT — VISUAL ACUITY
OD_PH_SC: 20/40
OD_SC: 20/250
OS_SC: 20/25
OS_SC+: -1
METHOD: SNELLEN - LINEAR

## 2022-08-18 ASSESSMENT — TONOMETRY
OD_IOP_MMHG: 13
IOP_METHOD: TONOPEN
OS_IOP_MMHG: 13

## 2022-08-18 ASSESSMENT — EXTERNAL EXAM - RIGHT EYE: OD_EXAM: DERMATOCHALASIS

## 2022-08-18 ASSESSMENT — CONF VISUAL FIELD
OD_NORMAL: 1
OS_NORMAL: 1

## 2022-08-18 ASSESSMENT — SLIT LAMP EXAM - LIDS
COMMENTS: 1+ BLEPH, THICKENED MARGINS, 1-2+ MGD
COMMENTS: 1+ BLEPH, THICKENED MARGINS, 1-2+ MGD

## 2022-08-18 ASSESSMENT — EXTERNAL EXAM - LEFT EYE: OS_EXAM: DERMATOCHALASIS

## 2022-08-18 NOTE — PROGRESS NOTES
Nik Nava is a 60 yo man D+365 s/p NMA allo sib PBSCT for Ph+ ALL, cGVHD enrolled on PQRST study.    See op note  Denies blood thinners    Lorrie Yap PA-C  [phone]

## 2022-08-18 NOTE — PROGRESS NOTES
A/P  1.) Dry Eye OU  -s/p BMT 08/2021  -Worsening dryness right eye>left eye, symptomatic for photophobia/tearing  -Right eye cornea worse today, left eye improved.   -Failed: Restasis/Xiidra (stinging), plugs (scarred puncta)  -Currently on: AT 3-10x/day as needed. Maxitrol benny at bedtime.   -Responded well to FML but stopped using after Rx ran out  -Reviewed options for treatment including: scleral lens (pt does not want to do I&R), serum tears, continuous steroid use, BCL  -He would like to proceed with BCL and steroid use (with monitoring) today. AV Oasys right eye placed. Daily oflox while on  -Restart FML bid OU until next appt. STOP benny while CL is in    2.) Hyperopia/Presbyopia OU  -Previously BCVA 20/20 with correction. Uses low plus readers for distance and higher plus readers for near  -Right eye acuity decreased today 2' to dryness    RTC 1 month recheck, sooner prn    I have confirmed the patient's CC, HPI and reviewed Past Medical History, Past Surgical History, Social History, Family History, Problem List, Medication List and agree with Tech note.     Aisha Juarez, ADILENE MONIQUEO SHARDAS

## 2022-08-18 NOTE — NURSING NOTE
"Oncology Rooming Note    August 18, 2022 8:00 AM   Nik Nava is a 62 year old male who presents for:    Chief Complaint   Patient presents with     Oncology Clinic Visit     ALL     Initial Vitals: BP (!) 161/88 (BP Location: Right arm, Patient Position: Sitting, Cuff Size: Adult Large)   Pulse 66   Temp 97.7  F (36.5  C) (Oral)   Resp 16   Wt 112.9 kg (248 lb 12.8 oz)   SpO2 99%   BMI 32.83 kg/m   Estimated body mass index is 32.83 kg/m  as calculated from the following:    Height as of 4/29/22: 1.854 m (6' 1\").    Weight as of this encounter: 112.9 kg (248 lb 12.8 oz). Body surface area is 2.41 meters squared.  No Pain (1) Comment: Data Unavailable   No LMP for male patient.  Allergies reviewed: Yes  Medications reviewed: Yes    Medications: Medication refills not needed today.  Pharmacy name entered into [employer]:    [facility] - [place], [place] - [address] [facility] [place], [place] - [phone] [facility] [phone]    Clinical concerns: None      Hung Baltazar            "

## 2022-08-18 NOTE — ANESTHESIA POSTPROCEDURE EVALUATION
Patient: Nik Nava    Procedure: Procedure(s):  BIOPSY, BONE MARROW       Anesthesia Type:  MAC    Note:  Disposition: Outpatient   Postop Pain Control: Uneventful            Sign Out: Well controlled pain   PONV: No   Neuro/Psych: Uneventful            Sign Out: Acceptable/Baseline neuro status   Airway/Respiratory: Uneventful            Sign Out: Acceptable/Baseline resp. status   CV/Hemodynamics: Uneventful            Sign Out: Acceptable CV status   Other NRE: NONE   DID A NON-ROUTINE EVENT OCCUR? No           Last vitals:  Vitals Value Taken Time   /85 08/18/22 1351   Temp 36.1  C (97  F) 08/18/22 1351   Pulse 55 08/18/22 1351   Resp 16 08/18/22 1351   SpO2 99 % 08/18/22 1351       Electronically Signed By: Trace Lynne MD  August 18, 2022  2:13 PM

## 2022-08-18 NOTE — INTERVAL H&P NOTE
"I have reviewed the surgical (or preoperative) H&P that is linked to this encounter, and examined the patient. There are no significant changes    Clinical Conditions Present on Arrival:  Clinically Significant Risk Factors Present on Admission               # Hypoalbuminemia: Albumin = 3.3 g/dL (Ref range: 3.4 - 5.0 g/dL) on admission, will monitor as appropriate     # Obesity: Estimated body mass index is 32.72 kg/m  as calculated from the following:    Height as of this encounter: 1.854 m (6' 1\").    Weight as of this encounter: 112.5 kg (248 lb).       "

## 2022-08-18 NOTE — LETTER
8/18/2022         RE: Nik Nava  [address] [facility] [address]        Dear Colleague,    Thank you for referring your patient, Nik Nava, to the [facility] BLOOD AND MARROW TRANSPLANT PROGRAM [place]. Please see a copy of my visit note below.    Nik Nava is a 62 yo man D+365 s/p NMA allo sib PBSCT for Ph+ ALL, cGVHD enrolled on PQRST study.    See op note  Denies blood thinners    Lorrie Yap PA-C  [phone]      Again, thank you for allowing me to participate in the care of your patient.        Sincerely,        [facility] BONE MARROW BIOPSY

## 2022-08-18 NOTE — NURSING NOTE
"Oncology Rooming Note    August 18, 2022 10:36 AM   Nik Nava is a 62 year old male who presents for:    Chief Complaint   Patient presents with     Oncology Clinic Visit     Rtn for ALL     Port Draw     Labs drawn via port by RN in lab.  VS taken     Initial Vitals: BP (!) 145/92   Pulse 62   Temp 97.5  F (36.4  C) (Oral)   Resp 16   Wt 112.3 kg (247 lb 8 oz)   SpO2 99%   BMI 32.65 kg/m   Estimated body mass index is 32.65 kg/m  as calculated from the following:    Height as of 4/29/22: 1.854 m (6' 1\").    Weight as of this encounter: 112.3 kg (247 lb 8 oz). Body surface area is 2.41 meters squared.  No Pain (0) Comment: Data Unavailable   No LMP for male patient.  Allergies reviewed: Yes  Medications reviewed: Yes    Medications: Medication refills not needed today.  Pharmacy name entered into [employer]:    [facility] - [place], [place] - [phone] [facility] [place], [place] - [phone]    Clinical concerns: Patient has no specific questions or clinical concerns outside of the reason for the visit.     Christin Rose, EMT            "

## 2022-08-18 NOTE — NURSING NOTE
"Chief Complaints and History of Present Illnesses   Patient presents with     Dry Eye(s) Follow-Up     Chief Complaint(s) and History of Present Illness(es)     Dry Eye(s) Follow-Up     Laterality: both eyes    Associated signs and symptoms: foreign body sensation, burning, tearing and blurred vision.  Negative for sinus problems and seasonal allergies              Comments     Patient states his eyes are very photophobic. Patient states wind dries his eyes up. Patient states \"it's all or nothing, I'm either super moist and watery eyes or really dry\". Patient denies pain and discomfort each eye.    Fay Polk, BOGDAN August 18, 2022 9:01 AM               "

## 2022-08-18 NOTE — PATIENT INSTRUCTIONS
STOP ointment at bedtime    CONTINUE artificial tears throughout the day    START FML (fluorometholone) eyedrops both eyes twice a day until our next visit    START ofloxacin antibiotic eyedrop once a day right eye while the bandage contact lens is in

## 2022-08-18 NOTE — NURSING NOTE
"Chief Complaint   Patient presents with     Oncology Clinic Visit     Rtn for ALL     Port Draw     Labs drawn via port by RN in lab.  VS taken       Port accessed with 20 gauge 3/4\" Power needle by RN, labs collected, line flushed with saline and heparin.  Vitals taken. Pt checked in for appointment(s).    Rajni Menezes RN    "

## 2022-08-18 NOTE — LETTER
2022         RE: Nik Nava  [address] [facility] [facility] [address]        Dear Colleague,    Thank you for referring your patient, Nik Nava, to the [facility] BLOOD AND MARROW TRANSPLANT PROGRAM [place]. Please see a copy of my visit note below.    Patient Name: Nik Nava  Patient MRN: [id number]  Patient : 1960    Abbreviated H&P and Pre-sedation Assessment for bone marrow biopsy with sedation    Chief complaint and/or reason for Procedure: 1 year s/p NMA allo sib PBSCT for Ph+ ALL    Planned level of sedation: Minimal - moderate sedation    History of problems with sedation: (patient or family hx): No    ASA Assessment Category: 2 - Mild systemic disease    History of sleep apnea: No    History of blood thinners: No     Appropriate NPO status: Yes    Current tobacco use: no (nicotine gum only)    Any recent fever, cough, chest or sinus congestion, SOB, weight loss, chest pain, diarrhea or constipation. No    Medications   Current Outpatient Medications   Medication     magnesium oxide (MAG-OX) 400 MG tablet     predniSONE (DELTASONE) 10 MG tablet     acyclovir (ZOVIRAX) 800 MG tablet     amLODIPine (NORVASC) 2.5 MG tablet     azithromycin (ZITHROMAX) 250 MG tablet     Carboxymethylcell-Glycerin PF (REFRESH RELIEVA PF) 0.5-1 % SOLN     dexamethasone alcohol-free (DECADRON) 0.1 MG/ML solution     fluorometholone (FML LIQUIFILM) 0.1 % ophthalmic suspension     levothyroxine (SYNTHROID/LEVOTHROID) 175 MCG tablet     LORazepam (ATIVAN) 1 MG tablet     melatonin 5 MG CAPS     micafungin (MYCAMINE) 50 MG injection     nicotine polacrilex (NICORETTE) 4 MG gum     ofloxacin (OCUFLOX) 0.3 % ophthalmic solution     omeprazole (PRILOSEC) 40 MG DR capsule     sennosides (SENOKOT) 8.6 MG tablet     tacrolimus (GENERIC EQUIVALENT) 0.5 MG capsule     tacrolimus (GENERIC EQUIVALENT) 1 MG capsule     tacrolimus (PROTOPIC) 0.1 % external ointment     traZODone (DESYREL) 50 MG tablet      ursodiol (ACTIGALL) 300 MG capsule     No current facility-administered medications for this visit.       Allergies  Sulfamethoxazole w/trimethoprim    PMH:  Past Medical History:   Diagnosis Date     Cancer (H)     renal cell     CKD (chronic kidney disease)      Thyroid disease        Past Surgical History:   Procedure Laterality Date     BACK SURGERY  2017     BONE MARROW BIOPSY, BONE SPECIMEN, NEEDLE/TROCAR Left 9/2/2021    Procedure: BIOPSY, BONE MARROW;  Surgeon: Jailyn Ny APRN CNP;  Location: [facility] OR     BONE MARROW BIOPSY, BONE SPECIMEN, NEEDLE/TROCAR Left 11/15/2021    Procedure: BIOPSY, BONE MARROW;  Surgeon: Socrates De La Torre;  Location: [facility] OR     BONE MARROW BIOPSY, BONE SPECIMEN, NEEDLE/TROCAR Left 2/7/2022    Procedure: BIOPSY, BONE MARROW;  Surgeon: Renetta Wiggins PA-C;  Location: [facility] OR     BRONCHOSCOPY (RIGID OR FLEXIBLE), DIAGNOSTIC N/A 4/29/2022    Procedure: BRONCHOSCOPY, WITH BRONCHOALVEOLAR LAVAGE;  Surgeon: Murtaza Garcia MD;  Location: [facility]     IR CVC TUNNEL PLACEMENT > 5 YRS OF AGE  8/4/2021     IR CVC TUNNEL REMOVAL LEFT  9/2/2021     IR LIVER BIOPSY PERCUTANEOUS  2/21/2022     PERCUTANEOUS BIOPSY LIVER N/A 2/21/2022    Procedure: NEEDLE BIOPSY, LIVER, PERCUTANEOUS;  Surgeon: Trace Castellanos MD;  Location: [facility]       Focused Physical exam pertinent to procedure:          (Details of heart, lung, ASA assessment and mallampati assessment in pre procedure assessment flowsheet)  General- healthy,alert,no distress   Recent vital signs-  There were no vitals taken for this visit.  HEART-regular rate and rhythm and no murmurs, gallops, or rub  LUNGS-Clear to Ausculation  OROPHARYNGEAL - MALLAMPATTI- Class I (visualization of the soft palate, fauces, uvula, anterior and posterior pillars)  * known lichenoid changes bilateral buccal mucosa with 1cm ulceration L buccal mucosa (chronic per pt)    A/P:Reviewed history, medications, allergies, clinical  information and pre procedure assessment. The patient was informed of the risks and benefits of the procedure.  They would like to proceed.  Nik Nava is approved for use of sedation during their procedure as noted above.      Meds reviewed and updated (multiple entries/doses for some meds)  Rec he discuss Pregnyl s/s with Dr. Avila Tobar vs ENT/oral surgery eval for chronic ulcer (GVHD but hx smoking)  No charge for visit as had Survivorship same day.      25 minutes spent on the date of the encounter doing chart review, review of test results, interpretation of tests, patient visit, documentation and discussion with family       Renetta Wiggins PA-C

## 2022-08-18 NOTE — PROGRESS NOTES
BMT 1-Year Post-Allogeneic BMT   Survivorship Care Plan    Date: August 18, 2022    Treatment Team:   Patient Care Team:  Wojciech Soares as PCP - General (Medical Oncology)  Wojciech Soares as MD (Medical Oncology)  Chapo Hill MD as Assigned Cancer Care Provider  Kel Schaefer MD as MD (Ophthalmology)  Aisha Juarez OD as Assigned Surgical Provider  Murtaza Garcia MD as Assigned PCP  Akshat Bearden MD as BMT Physician (Transplant)  [facility] Nurse Coord[facility] (Transplant)  Bouchra Baker MSW as  (BMT - Adult)  Paulina Silva APRN CNP as Nurse Practitioner (Hematology & Oncology)    Date of Transplant: 8/10/2021  Nik was an otherwise healthy 61-year-old gentleman, with the exception of a history of renal cancer resulting in a resection and remaining single remaining unilateral kidney, who was 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  He initially was treated with dasatinib and dexamethasone which led to significant improvement in his disease and then was referred to [facility].  Further induction was recommended and he returned to receive 2 cycles of vincristine, prednisone, daunorubicin, and pegasparaginase with intrathecal methotrexate.  This treatment was complicated by a pulmonary embolus leading to anticoagulation.  He also developed mild pancreatitis that led to the hospital stay.  Repeat marrow showed that his blast dropped to 1%.  He then received 1 course of high-dose mele-C.      He underwent a pretransplant evaluation and a bone marrow aspirate and biopsy with FISH, flow cytometry for MRD, and cytogenetics all demonstrate complete remission without detection of minimal residual disease.     Nik was treated with an HSCT on  8/10/21. His initial transplant course was uncomplicated. His Day +180 restaging bone marrow biopsy showed that he was in complete remission, 100% donor;  and on 2/16/22  his BCR ABL major breakpoint was undetectable.     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 His NIH score at the time of diagnosis 2/24/2022 : skin 2 maculopapular rash/erythema with no sclerotic features, mouth score 1 mild symptoms with lichenoid changes less than 25%, eyes score 2 moderate symptoms without new visual impairments due to KCS requiring lubricant eyedrops more than 3 times a day. Overall mild. Pulmonary infiltrate on 4/20/22 CT of the chest in the Right Upper Lobe, treated with IV micafungin; monitored by Dr. Garcia in pulmonology to date.  Viremias including CMV and EBV. And COVID19 infection, with first positive test on 5/8.    Ongoing issues at this time include cGVHD treated with tacrolimus and prednisone, monitoring of pulmonary infiltrate, weight gain secondary to steroids, and loss of muscle mass due to reduced physical activity secondary to ongoing restrictions due to immune compromised state.     Post-Transplant Treatment or Maintenance Therapy: Maintenance therapy and transfer of care to primary oncologist on HOLD due to therapy for cGVHD. Dr. Avila Tobar will inform the patient and primary oncologist when it is appropriate to start therapy.     Research Study Enrollment:    [id number]: Clinical Transplant-Related Long-term Outcomes of Alternative Donor Allogeneic Transplant     Status: Completed       Start Date: 2/4/2021 End Date: 8/10/2021     Branch: Main study & QOL sub-study       Enrollment Last Modified: 1/10/2022  6:53 PM by Interface, Adt Inbound               [id number] : Allo HSCT Using VERÓNICA for Hematological Diseases     Status: Enrolled/Randomized       Start Date: 7/18/2021       Branch: Cy/ Flu/TBI       Enrollment Last Modified: 9/22/2021 12:24 PM by Interface, Adt Inbound               Predicting the Quality of Response to Specific Treatments (PQRST). Chronic  GVHD Cohort     Status: Enrolled/Randomized       Start Date: 2/24/2022       Branch: Chronic GVHD       Enrollment Last Modified: 5/9/2022  3:38 PM by Interface, Adt Inbound           General Health Maintenance:     Call the BMT clinic if you develop a skin rash, oral sores, eye pain or excessive dryness, shortness of breath, new cough, bleeding, diarrhea, nausea, or vomiting.     Vaccinations should be given at 1 and 2 years after your transplant, these may be given at your annual anniversary visits in the BMT clinic, by your primary care provider, or your local oncologist. An exception is the influenza vaccine, which can be given after day +60 post-transplant during influenza season. See table below for more information.     You can receive the COVID19 vaccine if you have not already, for more information on how to sign up for an appointment you can the [employer] [place] vaccine website at [email or web address] or the        [place] Department of Health Vaccine Connector portal at https://[place].gov/covid19/vaccine/connector/connector.jsp    For general health concerns you can be seen by your primary care provider.     If you have questions about your transplant or follow up tests contact your BMT RN coordinator.      Vaccine Administration Schedule       Vaccinations Post-BMT: Please refer to our [employer] [place] BMT Vaccination Guidelines updated in March of 2021.         Slxzr-pa-Abfn-Disease Screening:    Has Nik Nava been diagnosed with chronic GVHD?  Yes: NIH score at the time of diagnosis 2/24/2022 : skin 2 maculopapular rash/erythema with no sclerotic features, mouth score 1 mild symptoms with lichenoid changes less than 25%, eyes score 2 moderate symptoms without new visual impairments due to KCS requiring lubricant eyedrops more than 3 times a day.    Current Systemic Immunosuppression:   1. Prednisone 15 mg every other day  2. Tacrolimus every 12 hours with therapeutic  monitoring     Chronic GVHD NIH Score At Today's Visit:   Score 0 Score 1 Score 2 Score 3   % 80-90% 60-70% <60%     X     Skin No sclerotic features Superficial sclerotic features Deep sclerotic features (hidebound)    No skin changes BSA 1-18% BSA 19-50% BSA >50%     X     Mouth No symptoms Mild, PO intake not limited Moderate, partial limitation in PO intake Severe, major limitation in PO intake      X    Eyes No symptoms Mild dry eyes Moderate, partially affecting ADL Severe, significantly affecting ADL      X    GI Tract No symptoms Symptoms without significant weight loss (<5%) Mild-mod weight loss (5-15%) OR diarrhea without significant impact in ADL Severe weight loss (>15%) OR esophageal dilatation required OR severe diarrhea impacting ADL    X      Liver Normal total bilirubin and transaminases < 3x ULN Normal total bilirubin with ALT/AST ? 3-5x ULN OR ALP ? 3x ULN Elevated total bilirubin but <3 mg/dl OR ALT >5x ULN Elevated total bilirubin > 3 mg/dl     X     Lungs No symptoms Mild dyspnea with exertion Moderate dyspnea (walking on flat ground) Severe dyspnea (requiring O2)    FEV1?80% FEV1 60-79% FEV1 40-59% FEV1 <39%    X      Joints/  Fascia No symptoms Mild tightness and decreased ROM not affecting ADL Moderate tightness and decreased ROM affecting ADL Contractures with significant limitation of ADL    X      Genital No symptoms Mild signs/symptoms Moderate signs/symptoms Severe signs/symptoms    X      Others Ascites Pericardial Effusion Pleural Effusion Nephrotic Syndrome           Myasthenia Gravis Peripheral Neuropathy Polymyositis Weight loss >5% without GI sxs           Eosinophilia >500 Platelets <100 Other (specify) Other (specify)          Overall NIH Chronic GVHD Score All scores = 0 Lung score = 0 PLUS   1 or 2 organs with score =1 Lung score = 1  OR  At least 1 organ with   score of 2  OR  3 organs with score = 1 Lung score = 2 or 3  OR  At least 1 other organ   with score = 3     No cGVHD Mild Moderate Severe      X        Current NIH cGVHD score on 8/18: Moderate    Survivorship Care Plan:     This individualized care plan is designed to inform you and your healthcare team of the recommended follow-up visits, tests, health maintenance activities, and cancer screening you should receive after transplant.     Immune System:  Risks Preventative Measures Recommendations   Infections, viral reactivations   Continue to take prescribed medications to prevent infection if you are treated for ngvuz-fr-fngb disease    Symptoms of a cold such as fever, cough, congestion, and shortness of breath should be reported to your primary care provider    Immunizations will be administered at 1 & 2 years after transplant. See schedule above. CMV (6 mos & annually for patients with GVHD) and Other message sent to BMT care team reguarding timing of vaccines. Patient currentlly on systemic immunosuppression. Vaccines were not given during todays visit.     Patient was provided a copy of the vaccination guidelines for BMT patients at our institution for reference in this care plan.     Eyes:   Risks Preventative Measures Recommendations   Cataracts, dry eyes, GVH of the eyes, viral infections, and other eye changes   Yearly eye exam     Screening and treatment for high blood pressure or diabetes    Call if you have eye pain, visual changes, or floaters immediately    Call If you are experiencing dry eyes and symptoms do not improve with Refresh eye drops  Patient will schedule an annual routine exam with community ophthalmologist and None at this time     Currently being evaluated for cGVH of the eyes with persistent dryness and injection bilaterally. Appointment of specialist evaluation 8/18/22.      Mouth:  Risks Preventative Measures Recommendations   Dry mouth, oral GVH, cavities, and oral cancer   Report any new symptoms such as dry mouth or painful sores     You can use over the counter Biotene for dry mouth  or try sucking on sour candy before meals    Get a dental checkup every year and a cleaning every 6 months    If you have a prosthetic heart valve, central venous catheter or  port , or are still taking immunosuppression you may need to take an antibiotic before your dental visits. None at this time, patient has dental provider and will schedule routine exam       Lungs  Risks Preventative Measures Recommendations   Changes in function from chemotherapy or radiation; lung infections (pneumonia)   Tell your provider about difficulty breathing, a cough, or new shortness of breath     Avoid use of tobacco products or smoking    Routine lung exams   Pulmonary Function Tests (Recommended annually post-transplant)     Last PFT was 5 months prior to today's visit. Will obtain repeat PFT due to diagnosis of cGVHD.     Of note, he is followed by Dr. Garcia for RUL GGO inflitrate. Per repeat CCT the GGO areas have completely resolved as of 8/16/22.    Patient is also due for Pentamidine. Will schedule both the PFT and pentamidine treatment in the clinic within the next two weeks.       Heart and Blood Vessels  Heart Disease Risk Score: The ASCVD Risk score (Raysal RADHA Jr., et al., 2013) failed to calculate for the following reasons:    Cannot find a previous HDL lab    Cannot find a previous total cholesterol lab  Risks Preventative Measures Recommendations   Damage from chemotherapy and/or radiation; early development of heart valve disease    Ask your provider if you should receive consultation from a cardiologist (heart doctor) or have special screening    Follow a  heart healthy  lifestyle. For more information visit the National Heart, Lung, and Blood [place] website at: https://www.nhlbi.nih.gov/health/health-topics/topics/heart-healthy-lifestyle-changes     Don't smoke. If you currently smoke and are ready to quit, we can help you find ways to quit    Maintain a healthy weight    Exercise regularly    Avoid foods that  have high amounts of:  o Salt/sodium (less than 2,300 mg of sodium per day)  o Saturated and trans fats  o Limit alcohol to less than 1 drink for women and 2 drinks for men per day  o Sugar such as soft drinks or sugary snacks Fasting Lipid Profile or Direct LDL (Recommended annually)    Will order the lipid profile today. Results will be evaluated by Dr. Avila Tobar during his scheduled visit next week.     Hormones  Risks Preventative Measures Recommendations   Low thyroid function, low function of other glands (adrenals and others)   Certain endocrine/hormone disorders are more common after transplant. For this reason, talk to your provider about:  o Fatigue  o Muscle weakness  o Changes in cold tolerance  o Reduced interest in sex  o Erectile dysfunction     Certain tests may be used to monitor for hormone changes if you are experiencing symptoms. These tests are done at 1 year    If you have been on steroids you will need to slowly taper or decrease the dose as recommended by your provider/pharmacist. Do not stop taking steroids without consulting with your provider.     If you have been on steroids in the past, you may need steroids during periods of illness TSH with T4 reflex (Recommended 1 & 2 years post-transplant), Testosterone/FSH/LH (Men, Ordered based on symptoms), Fasting Glucose (Recommended 6 mos & annually post-transplant) and Hgb A1C (Recommended annually post-transplant)    Testosterone, TSH, and fasting glucose ordered for today. Results will be evaluated and discussed with the patient by Dr. Avila Tobar at the time of his follow up visit next week.      Liver:  Risks Preventative Measures Recommendations      Damage from chemotherapy or other drugs, buildup of iron from blood transfusions, infections, and GVHD     Certain tests may be ordered at your next survivorship visit to evaluate liver function based on your individual risk factors.    Talk to your provider before taking herbal supplements or  over the counter drugs like Tylenol.    Ask your doctor if you should be treated for iron overload    Avoid alcohol in excess     Hepatic Panel/LFTs (Recommended every 3-6 mos the 1st year post-transplant & annually) and Serum Ferritin (Recommended 1 year post-transplant)    Ongoing monitoring of LFTs due to cGVH. LFTs, total bilirubin, and serum Ferritin will be checked with today's labs and reviewed with the patient next week during his follow up visit with Dr. Avila Tobar.      Kidneys and Bladder:  Risks Preventative Measures Recommendations   Damage from chemotherapy or other drugs, infections, high blood pressure   Monitoring blood tests of kidney function during follow up visits    Treating high blood pressure and diabetes     Drink adequate amounts of water    Report symptoms of infection such as frequent urination, pain with urination, foul odor to urine, or blood in the urine    Talk to your provider before taking herbal supplements or over the counter drugs like Ibuprofen Serum creatinine checked as part of anniversary labs or at least annually by primary provider       Nervous System:  Risks Preventative Measures Recommendations   Neuropathy from chemotherapy, changes in cognitive function   Report changes in sensation or discomfort in feet or hands    Tell your provider about ongoing changes in memory, ability to concentrate, or inability to make decisions   None at this time       Muscle & Connective Tissue  Risks Preventative Measures Recommendations   Reduced muscle strength, reduced stamina, GVHD causing hardening of muscle tissues (scleroderma) or inflammation of tissue over muscles (fascitis)  Let your provider know if:    You notice changes in your muscle strength    Require extra assistance with daily activities    Experience pain or difficult bending or moving joints    If you have been on steroids and are experiencing weakness particularly when standing up from a chair     Need help creating an  exercise routine    Notice reduced range of motion of the arms, hips, or legs  Follow general guidelines for physical activity as recommended by the Office of Disease Prevention & Health Promotion:    Avoid Inactivity  Some physical activity is better than none -- any amount has benefits.    Do Aerobic Activity  Do aerobic physical activity in episodes of at least 10 minutes, as many times as possible per day. This could include going for walks or using the elliptical or stationary bike.  Ask your doctor what aerobic activities would be safe and helpful for you, and set a goal for yourself!    Strengthen Muscles  Do muscle-strengthening activities (such as lifting light weights or using resistance bands and/or going up and down stairs) that are moderate or high intensity and involve all major muscle groups at least 4 days a week. Bone Scan (Recommended 1 year) and Calcium & Vitamin D Levels (Recommended annually)     Calcium, Vitamin D screening, and bone scan ordered today. Schedulers will contact the patient to set up the appointment for the bone scan. Results will be reviewed by Dr. Avila Tobar.       Emotional Health  Risks Preventative Measures Recommendations   Stress, depression, anxiety Talk to your provider about:    Changes in feelings, mood, or emotional wellbeing    Interest in support groups    If you are concerned about your caregivers emotional wellbeing    Desire to speak with a counselor for ongoing support  None at this time       Sexual Health  Risks Preventative Measures Recommendations   Reduced libido due to hormonal changes, erectile dysfunction, vaginal dryness, vaginal brgwx-ll-xjfe disease, vaginal infections, sexually transmitted diseases Talk to your provider about:    Reduced libido or concerns regarding your sexual health    Men: Erectile dysfunction     Women: Vaginal dryness, pain during intercourse, vaginal bleeding after intercourse, changes in vaginal discharge that may indicate  infection (green/white/foul odor)     General Recommendations:    It is safe to have sex if your platelet count is > 50,000 and you feel physically and emotionally ready     Women can use water-based lubricants to reduce discomfort from dryness. Prescription topical estrogen may help as well.    Use barrier protection such as condoms to prevent sexually transmitted diseases, you are at higher risk for infections due to a weakened immune system    For more information check out the National Marrow Donor Program web page on this topic:  https://[email or web address].org/patients-and-families/life-after-transplant/coping-with-life-after-transplant/relationships-and-sexual-health/  Other will check serum testosterone level with labs today due to complaints of reduced libido and ED.         Cancer Screening:  Risks Preventative Measures Recommendations   Higher risk for the development of solid tumors, PTLD, and blood cancers   Preform a full self skin exam monthly to assess for any changes in moles or signs of skin cancer    Minimize excessive sun exposure and wear sunscreen    Women should preform breast-self exams and report any changes in such as a new lump, discharge from nipples, and red or dimpled areas of the breast.     Imaging for breast cancer known as mammography is recommended for women starting at age 40 or 8 years after radiation exposure.     Screening for colorectal cancer should begin at age 50.     All women should have a pap smear every 1-3 years beginning at age 21    Men should perform a monthly testicular self-exam if they have been exposed to radiation as part of their cancer treatment.    Fecal Occult Blood Test (Recommended annually), Colonoscopy (Recommended every 10 years after the age of 50) and Dermatology Referral (Annual skin exam or evaluation of lesion)    Placed referral to dermatologist for a full skin check. Patient states he has never had a skin check before. Directed him to continue with  annual skin checks with a dermatologist and to continue using sun-screen to reduce his risk.         Recommended Tests & Follow-Up:  Referrals & Tests Ordered Today:  Your BMT physician will review these tests and referral results. If you require treatment based on the results, a member of the BMT team will contact you.  Orders placed today:  Orders Placed This Encounter   Procedures     DXA scan     Testosterone     Ferritin     Ionized Calcium     Vitamin D panel     Tacrolimus Timed Levels AUC     Lipid panel reflex to direct LDL Fasting     Dermatology referral     General PFT Lab (Please always keep checked)     Pulmonary Function Test     (Note to providers: After signing orders use the refresh tool in the note window to display results)   Future Tests/Referrals:   All recommendations above should be completed within 2 months either by your primary care provider or local oncologist (cancer doctor).   Recommendations that were not ordered today should be completed by your:  BMT Provider Team   Follow Up Care:  Continue to see your care team members routinely after transplant.  BMT Provider: Follow up with Dr. Avila Tobar on 8/23/2022, this is a virtual visit.  Hematologist/Oncologist:Dr. Avila Tobar will discuss plan for maintenance therapy and transfer of treatment monitoring to primary oncologist during his visit on 8/23/2022.  Primary Care Provider: Please see your PCP at least annually for health maintenance and monitoring.     Paulina Silva, APRN CNP    I spent 47 minutes with the patient and his wife, over half of which was spent discussing preventative care strategies, self-management practices, and potential complications after transplant.      Information used for these recommendations was obtained from: María NAlicia S., JIGAR Weathers, ANNA Hoang, URMILA Mack., KHLOE Alcala., Daja, KAYLEN MCDONALD.,   Shaan, K. M. (2013). Prevalence of Hematopoietic Cell Transplant Survivors in the [place]. Biology of Blood and  Marrow Transplantation?: Journal of the American Society for Blood and Marrow Transplantation, 19(10), 9144-2485. doi 10.1016/j.bbmt.2013.07.020

## 2022-08-18 NOTE — PROGRESS NOTES
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 No    Medications   Current Outpatient Medications   Medication     magnesium oxide (MAG-OX) 400 MG tablet     predniSONE (DELTASONE) 10 MG tablet     acyclovir (ZOVIRAX) 800 MG tablet     amLODIPine (NORVASC) 2.5 MG tablet     azithromycin (ZITHROMAX) 250 MG tablet     Carboxymethylcell-Glycerin PF (REFRESH RELIEVA PF) 0.5-1 % SOLN     dexamethasone alcohol-free (DECADRON) 0.1 MG/ML solution     fluorometholone (FML LIQUIFILM) 0.1 % ophthalmic suspension     levothyroxine (SYNTHROID/LEVOTHROID) 175 MCG tablet     LORazepam (ATIVAN) 1 MG tablet     melatonin 5 MG CAPS     micafungin (MYCAMINE) 50 MG injection     nicotine polacrilex (NICORETTE) 4 MG gum     ofloxacin (OCUFLOX) 0.3 % ophthalmic solution     omeprazole (PRILOSEC) 40 MG DR capsule     sennosides (SENOKOT) 8.6 MG tablet     tacrolimus (GENERIC EQUIVALENT) 0.5 MG capsule     tacrolimus (GENERIC EQUIVALENT) 1 MG capsule     tacrolimus (PROTOPIC) 0.1 % external ointment     traZODone (DESYREL) 50 MG tablet     ursodiol (ACTIGALL) 300 MG capsule     No current facility-administered medications for this visit.       Allergies  Sulfamethoxazole w/trimethoprim    PMH:  Past Medical History:   Diagnosis Date     Cancer (H)     renal cell     CKD (chronic kidney disease)      Thyroid disease         Past Surgical History:   Procedure Laterality Date     BACK SURGERY  2017     BONE MARROW BIOPSY, BONE SPECIMEN, NEEDLE/TROCAR Left 9/2/2021    Procedure: BIOPSY, BONE MARROW;  Surgeon: Jailyn Ny APRN CNP;  Location: [facility] OR     BONE MARROW BIOPSY, BONE SPECIMEN, NEEDLE/TROCAR Left 11/15/2021    Procedure: BIOPSY, BONE MARROW;  Surgeon: Socrates De La Torre;  Location: [facility] OR     BONE MARROW BIOPSY, BONE SPECIMEN, NEEDLE/TROCAR Left 2/7/2022    Procedure: BIOPSY, BONE MARROW;  Surgeon: Renetta Wiggins PA-C;  Location: [facility] OR     BRONCHOSCOPY (RIGID OR FLEXIBLE), DIAGNOSTIC N/A 4/29/2022    Procedure: BRONCHOSCOPY, WITH BRONCHOALVEOLAR LAVAGE;  Surgeon: Murtaza Garcia MD;  Location: [facility]     IR CVC TUNNEL PLACEMENT > 5 YRS OF AGE  8/4/2021     IR CVC TUNNEL REMOVAL LEFT  9/2/2021     IR LIVER BIOPSY PERCUTANEOUS  2/21/2022     PERCUTANEOUS BIOPSY LIVER N/A 2/21/2022    Procedure: NEEDLE BIOPSY, LIVER, PERCUTANEOUS;  Surgeon: Trace Castellanos MD;  Location: [facility] OR       Focused Physical exam pertinent to procedure:          (Details of heart, lung, ASA assessment and mallampati assessment in pre procedure assessment flowsheet)  General- healthy,alert,no distress   Recent vital signs-  There were no vitals taken for this visit.  HEART-regular rate and rhythm and no murmurs, gallops, or rub  LUNGS-Clear to Ausculation  OROPHARYNGEAL - MALLAMPATTI- Class I (visualization of the soft palate, fauces, uvula, anterior and posterior pillars)  * known lichenoid changes bilateral buccal mucosa with 1cm ulceration L buccal mucosa (chronic per pt)    A/P:Reviewed history, medications, allergies, clinical information and pre procedure assessment. The patient was informed of the risks and benefits of the procedure.  They would like to proceed.  Nik Nava is approved for use of sedation during their procedure as noted above.      Meds reviewed and updated (multiple entries/doses for some  meds)  Rec he discuss Pregnyl s/s with Dr. Avila Tobar vs ENT/oral surgery eval for chronic ulcer (GVHD but hx smoking)  No charge for visit as had Survivorship same day.  25 minutes spent on the date of the encounter doing chart review, review of test results, interpretation of tests, patient visit, documentation and discussion with family     Renetta Wiggins PA-C

## 2022-08-18 NOTE — ANESTHESIA CARE TRANSFER NOTE
Patient: Nik Nava    Procedure: Procedure(s):  BIOPSY, BONE MARROW       Diagnosis: Status post bone marrow transplant (H) [Z94.81]  Diagnosis Additional Information: No value filed.    Anesthesia Type:   MAC     Note:    Oropharynx: oropharynx clear of all foreign objects and spontaneously breathing  Level of Consciousness: awake  Oxygen Supplementation: room air    Independent Airway: airway patency satisfactory and stable  Dentition: dentition unchanged  Vital Signs Stable: post-procedure vital signs reviewed and stable  Report to RN Given: handoff report given  Patient transferred to: Phase II    Handoff Report: Identifed the Patient, Identified the Reponsible Provider, Reviewed the pertinent medical history, Discussed the surgical course, Reviewed Intra-OP anesthesia mangement and issues during anesthesia, Set expectations for post-procedure period and Allowed opportunity for questions and acknowledgement of understanding      Vitals:  Vitals Value Taken Time   /68    Temp 36  C (96.8  F) 08/18/22 1320   Pulse 71 08/18/22 1320   Resp 16 08/18/22 1320   SpO2 99 % 08/18/22 1320       Electronically Signed By: PEE Grover CRNA  August 18, 2022  1:28 PM

## 2022-08-18 NOTE — ANESTHESIA PREPROCEDURE EVALUATION
Anesthesia Pre-Procedure Evaluation    Patient: Nik Nava   MRN: [id number] : 1960        Procedure : Procedure(s):  BIOPSY, BONE MARROW          Past Medical History:   Diagnosis Date     Cancer (H)     renal cell     CKD (chronic kidney disease)      Thyroid disease       Past Surgical History:   Procedure Laterality Date     BACK SURGERY       BONE MARROW BIOPSY, BONE SPECIMEN, NEEDLE/TROCAR Left 2021    Procedure: BIOPSY, BONE MARROW;  Surgeon: Jailyn Ny APRN CNP;  Location: [facility]     BONE MARROW BIOPSY, BONE SPECIMEN, NEEDLE/TROCAR Left 11/15/2021    Procedure: BIOPSY, BONE MARROW;  Surgeon: Socrates De La Torre;  Location: [facility]     BONE MARROW BIOPSY, BONE SPECIMEN, NEEDLE/TROCAR Left 2022    Procedure: BIOPSY, BONE MARROW;  Surgeon: Renetta Wiggins PA-C;  Location: [facility] OR     BRONCHOSCOPY (RIGID OR FLEXIBLE), DIAGNOSTIC N/A 2022    Procedure: BRONCHOSCOPY, WITH BRONCHOALVEOLAR LAVAGE;  Surgeon: Murtzaa Garcia MD;  Location: [facility]     IR CVC TUNNEL PLACEMENT > 5 YRS OF AGE  2021     IR CVC TUNNEL REMOVAL LEFT  2021     IR LIVER BIOPSY PERCUTANEOUS  2022     PERCUTANEOUS BIOPSY LIVER N/A 2022    Procedure: NEEDLE BIOPSY, LIVER, PERCUTANEOUS;  Surgeon: Trace Castellanos MD;  Location: [facility] OR      Allergies   Allergen Reactions     Sulfamethoxazole W/Trimethoprim Rash      Social History     Tobacco Use     Smoking status: Former Smoker     Packs/day: 2.00     Years: 30.00     Pack years: 60.00     Quit date: 2019     Years since quitting: 3.2     Smokeless tobacco: Never Used   Substance Use Topics     Alcohol use: Not Currently      Wt Readings from Last 1 Encounters:   22 112.5 kg (248 lb)        Anesthesia Evaluation            ROS/MED HX  ENT/Pulmonary:       Neurologic:       Cardiovascular:       METS/Exercise Tolerance:     Hematologic:       Musculoskeletal:       GI/Hepatic:       Renal/Genitourinary:     (+)  renal disease,     Endo:     (+) thyroid problem, hypothyroidism,     Psychiatric/Substance Use:       Infectious Disease:       Malignancy:       Other:            Physical Exam    Airway  airway exam normal      Mallampati: II   TM distance: > 3 FB   Neck ROM: full   Mouth opening: > 3 cm    Respiratory Devices and Support         Dental  no notable dental history         Cardiovascular          Rhythm and rate: regular and normal     Pulmonary   pulmonary exam normal        breath sounds clear to auscultation           OUTSIDE LABS:  CBC:   Lab Results   Component Value Date    WBC 6.6 08/18/2022    WBC 6.1 07/26/2022    HGB 13.6 08/18/2022    HGB 13.8 07/26/2022    HCT 40.0 08/18/2022    HCT 40.0 07/26/2022     (L) 08/18/2022     (L) 07/26/2022     BMP:   Lab Results   Component Value Date     08/18/2022     07/26/2022    POTASSIUM 4.2 08/18/2022    POTASSIUM 4.3 07/26/2022    CHLORIDE 105 08/18/2022    CHLORIDE 106 07/26/2022    CO2 27 08/18/2022    CO2 26 07/26/2022    BUN 38 (H) 08/18/2022    BUN 38 (H) 07/26/2022    CR 0.94 08/18/2022    CR 0.97 07/26/2022    GLC 94 08/18/2022     (H) 07/26/2022     COAGS:   Lab Results   Component Value Date    PTT 28 08/03/2021    INR 0.96 02/07/2022     POC: No results found for: BGM, HCG, HCGS  HEPATIC:   Lab Results   Component Value Date    ALBUMIN 3.3 (L) 08/18/2022    PROTTOTAL 6.7 (L) 08/18/2022    ALT 42 08/18/2022    AST 41 08/18/2022    ALKPHOS 112 08/18/2022    BILITOTAL 0.6 08/18/2022     OTHER:   Lab Results   Component Value Date    LACT 0.6 (L) 09/12/2021    DAMON 9.4 08/18/2022    PHOS 3.5 08/22/2021    MAG 1.9 08/18/2022    AMYLASE 85 04/06/2022    TSH 0.88 04/06/2022    T4 1.33 11/18/2021    CRP 4.5 09/12/2021    SED 56 (H) 09/12/2021       Anesthesia Plan    ASA Status:  2   NPO Status:  NPO Appropriate    Anesthesia Type: MAC.     - Reason for MAC: immobility needed, straight local not clinically adequate   Induction:  Intravenous.   Maintenance: TIVA.        Consents    Anesthesia Plan(s) and associated risks, benefits, and realistic alternatives discussed. Questions answered and patient/representative(s) expressed understanding.    - Discussed:     - Discussed with:  Patient      - Extended Intubation/Ventilatory Support Discussed: No.      - Patient is DNR/DNI Status: No    Use of blood products discussed: No .     Postoperative Care    Pain management: IV analgesics, Oral pain medications, Multi-modal analgesia.   PONV prophylaxis: Ondansetron (or other 5HT-3), Background Propofol Infusion     Comments:                Trace Lynne MD

## 2022-08-18 NOTE — H&P (VIEW-ONLY)
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 No    Medications   Current Outpatient Medications   Medication     magnesium oxide (MAG-OX) 400 MG tablet     predniSONE (DELTASONE) 10 MG tablet     acyclovir (ZOVIRAX) 800 MG tablet     amLODIPine (NORVASC) 2.5 MG tablet     azithromycin (ZITHROMAX) 250 MG tablet     Carboxymethylcell-Glycerin PF (REFRESH RELIEVA PF) 0.5-1 % SOLN     dexamethasone alcohol-free (DECADRON) 0.1 MG/ML solution     fluorometholone (FML LIQUIFILM) 0.1 % ophthalmic suspension     levothyroxine (SYNTHROID/LEVOTHROID) 175 MCG tablet     LORazepam (ATIVAN) 1 MG tablet     melatonin 5 MG CAPS     micafungin (MYCAMINE) 50 MG injection     nicotine polacrilex (NICORETTE) 4 MG gum     ofloxacin (OCUFLOX) 0.3 % ophthalmic solution     omeprazole (PRILOSEC) 40 MG DR capsule     sennosides (SENOKOT) 8.6 MG tablet     tacrolimus (GENERIC EQUIVALENT) 0.5 MG capsule     tacrolimus (GENERIC EQUIVALENT) 1 MG capsule     tacrolimus (PROTOPIC) 0.1 % external ointment     traZODone (DESYREL) 50 MG tablet     ursodiol (ACTIGALL) 300 MG capsule     No current facility-administered medications for this visit.       Allergies  Sulfamethoxazole w/trimethoprim    PMH:  Past Medical History:   Diagnosis Date     Cancer (H)     renal cell     CKD (chronic kidney disease)      Thyroid disease         Past Surgical History:   Procedure Laterality Date     BACK SURGERY  2017     BONE MARROW BIOPSY, BONE SPECIMEN, NEEDLE/TROCAR Left 9/2/2021    Procedure: BIOPSY, BONE MARROW;  Surgeon: Jailyn Ny APRN CNP;  Location: [facility]     BONE MARROW BIOPSY, BONE SPECIMEN, NEEDLE/TROCAR Left 11/15/2021    Procedure: BIOPSY, BONE MARROW;  Surgeon: Socrates De La Torre;  Location: [facility]     BONE MARROW BIOPSY, BONE SPECIMEN, NEEDLE/TROCAR Left 2/7/2022    Procedure: BIOPSY, BONE MARROW;  Surgeon: Renetta Wiggins PA-C;  Location: [facility]     BRONCHOSCOPY (RIGID OR FLEXIBLE), DIAGNOSTIC N/A 4/29/2022    Procedure: BRONCHOSCOPY, WITH BRONCHOALVEOLAR LAVAGE;  Surgeon: Murtaza Garcia MD;  Location: [facility]     IR CVC TUNNEL PLACEMENT > 5 YRS OF AGE  8/4/2021     IR CVC TUNNEL REMOVAL LEFT  9/2/2021     IR LIVER BIOPSY PERCUTANEOUS  2/21/2022     PERCUTANEOUS BIOPSY LIVER N/A 2/21/2022    Procedure: NEEDLE BIOPSY, LIVER, PERCUTANEOUS;  Surgeon: Trace Castellanos MD;  Location: [facility] OR       Focused Physical exam pertinent to procedure:          (Details of heart, lung, ASA assessment and mallampati assessment in pre procedure assessment flowsheet)  General- healthy,alert,no distress   Recent vital signs-  There were no vitals taken for this visit.  HEART-regular rate and rhythm and no murmurs, gallops, or rub  LUNGS-Clear to Ausculation  OROPHARYNGEAL - MALLAMPATTI- Class I (visualization of the soft palate, fauces, uvula, anterior and posterior pillars)  * known lichenoid changes bilateral buccal mucosa with 1cm ulceration L buccal mucosa (chronic per pt)    A/P:Reviewed history, medications, allergies, clinical information and pre procedure assessment. The patient was informed of the risks and benefits of the procedure.  They would like to proceed.  Nik Nava is approved for use of sedation during their procedure as noted above.      Meds reviewed and updated (multiple entries/doses for some  meds)  Rec he discuss Pregnyl s/s with Dr. Avila Tobar vs ENT/oral surgery eval for chronic ulcer (GVHD but hx smoking)  No charge for visit as had Survivorship same day.  25 minutes spent on the date of the encounter doing chart review, review of test results, interpretation of tests, patient visit, documentation and discussion with family     Renetta Wiggins PA-C

## 2022-08-18 NOTE — DISCHARGE INSTRUCTIONS
How to Care for your Bone Marrow Biopsy    Activity  Relax and take it easy for the next 24 hours.   Resume regular activity after 24 hours.    Diet   Resume pre-procedure diet and drink plenty of fluids.    If you received sedation, you may feel a little nauseated so start with a clear liquid diet until the nausea passes.    Do Not Immerse Bone Marrow Biopsy Puncture Site in Water  Do not take a bath until the puncture site has healed.  Do not sit in a hot tub or spa until the puncture site has healed.  Do not swim until the puncture site has healed.  Wait 24 hours before taking a shower.    Drainage  Drainage should be minimal.  IF bleeding should occur and soaks through the dressing, lie down and put pressure on the puncture site.    IF bleeding persists, apply gentle pressure with your hand over the dressing for 5 minutes.    IF the pressure doesn't stop the bleeding, contact your provider immediately.    Dressing  Keep the dressing dry and in place for 24 hours, unless instructed otherwise.    IF bleeding soaks through the dressing in the first 24 hours do NOT remove the dressing as you may pull off any scab that has formed.  Instead, reinforce the dressing with extra gauze and tape.    No Alcohol  Do not drink alcoholic beverages for the next 24 hours.    No Driving or Operating Machinery  No driving or operating machinery for the next 24 hours.    Notify your provider IF:    Excessive bleeding or drainage at the puncture site    Excessive swelling, redness or tenderness at the puncture site    Fever above 100.5 degrees taken orally    Severe pain    Drainage that is green, yellow, thick white or has a bad odor    Telephone Numbers  Bone Marrow transplant clinic:  [phone] (Monday thru Friday, 8:00 am to 4:00 pm)  After business hours call the [facility]:  [phone] and ask for the Hematology/BMT doctor on call.  Or call the Emergency Room at the [facility]  [facility]:  [phone].

## 2022-08-18 NOTE — LETTER
Date:August 19, 2022      Provider requested that no letter be sent. Do not send.       [facility]

## 2022-08-19 ENCOUNTER — TELEPHONE (OUTPATIENT)
Dept: TRANSPLANT | Facility: CLINIC | Age: 62
End: 2022-08-19

## 2022-08-19 ENCOUNTER — CARE COORDINATION (OUTPATIENT)
Dept: TRANSPLANT | Facility: CLINIC | Age: 62
End: 2022-08-19

## 2022-08-19 ENCOUNTER — HOME INFUSION (PRE-WILLOW HOME INFUSION) (OUTPATIENT)
Dept: PHARMACY | Facility: CLINIC | Age: 62
End: 2022-08-19

## 2022-08-19 DIAGNOSIS — Z94.81 STATUS POST BONE MARROW TRANSPLANT (H): ICD-10-CM

## 2022-08-19 DIAGNOSIS — C91.01 ACUTE LYMPHOBLASTIC LEUKEMIA (ALL) IN REMISSION (H): ICD-10-CM

## 2022-08-19 LAB
CMV DNA SPEC NAA+PROBE-ACNC: NOT DETECTED IU/ML
EBV DNA COPIES/ML, INSTRUMENT: 971 COPIES/ML
EBV DNA SPEC NAA+PROBE-LOG#: 3 {LOG_COPIES}/ML
IGA SERPL-MCNC: 77 MG/DL (ref 84–499)
IGG SERPL-MCNC: 652 MG/DL (ref 610–1616)
IGM SERPL-MCNC: 38 MG/DL (ref 35–242)
PATH REPORT.COMMENTS IMP SPEC: NORMAL
PATH REPORT.FINAL DX SPEC: NORMAL
PATH REPORT.FINAL DX SPEC: NORMAL
PATH REPORT.GROSS SPEC: NORMAL
PATH REPORT.MICROSCOPIC SPEC OTHER STN: NORMAL
PATH REPORT.RELEVANT HX SPEC: NORMAL
PATH REPORT.RELEVANT HX SPEC: NORMAL

## 2022-08-19 RX ORDER — TACROLIMUS 1 MG/1
CAPSULE ORAL
Qty: 120 CAPSULE | Refills: 1 | COMMUNITY
Start: 2022-08-19 | End: 2022-09-10

## 2022-08-19 RX ORDER — TACROLIMUS 0.5 MG/1
CAPSULE ORAL
Qty: 60 CAPSULE | Refills: 1 | Status: SHIPPED | OUTPATIENT
Start: 2022-08-19 | End: 2022-09-10

## 2022-08-19 RX ORDER — TACROLIMUS 0.5 MG/1
CAPSULE ORAL
Qty: 60 CAPSULE | Refills: 1 | COMMUNITY
Start: 2022-08-19 | End: 2022-08-19

## 2022-08-19 NOTE — TELEPHONE ENCOUNTER
Called Nik regarding low tacrolimus level 3.5 and per [place] navin, asked him in increase dose to 2.5 mg po bid.

## 2022-08-19 NOTE — PROGRESS NOTES
This is a recent snapshot of the patient's [facility] medical record.  For current drug dose and complete information and questions, call [phone] or In [facility] pool, [facility] home infusion ([id number])  CSN Number:  [id number]

## 2022-08-19 NOTE — PROGRESS NOTES
This writer spoke to Lamar (Nik's wife) regarding questions for antifungal coverage.  Lamar states when Nik was in the clinic on 8/18 for his bone marrow biopsy his port was de-accessed before they remembered that he had a dose of IV Micafungin to do at home today.  Lamar was looking for guidance if he could transition back to fluconazole now that his LFTs have normalized and/or if it would be OK to skip the Micafungin dose that was supposed to be given today given that his port is not accessed.  This writer paged triage GIRISH for guidance, given that Dr. Avila Tobar is out of the office this week - awaiting call back.      This writer spoke with Renetta Wiggins who stated it is OK to skip Micafungin dose today.  Per Renetta, it would be OK to be without coverage until Nik's next meeting with Dr. Avila Tobar on Tuesday 8/23/22 when a plan for ongoing antifungal coverage can be made.  Per Renetta, patient would be OK to take 100mg of Fluconazole per day if he didn't want to be without coverage.  Per Lamar, they will likely just hold off and wait until their meeting with Dr. Avila Tobar on Tuesday.  No further questions or concerns at this time, and will call back if anything changes.

## 2022-08-20 NOTE — OP NOTE
"BMT ONC Adult Bone Marrow Biopsy Procedure Note  August 20, 2022  BP (!) 151/85   Pulse 55   Temp 97  F (36.1  C) (Temporal)   Resp 16   Ht 1.854 m (6' 1\")   Wt 112.5 kg (248 lb)   SpO2 99%   BMI 32.72 kg/m         DIAGNOSIS: Nik Nava is a 62 yo man D+365 s/p NMA allo sib PBSCT for Ph+ ALL, cGVHD enrolled on PQRST study.    PROCEDURE: Unilateral Bone Marrow Biopsy and Unilateral Aspirate    [place] [facility] [place]    Patient s identification was positively verified by verbal identification and invasive procedure safety checklist was completed. Informed consent was obtained. Following the administration of Propofol as pre-medication, patient was placed in the prone position and prepped and draped in a sterile manner. Approximately 10 cc of 1% Lidocaine was used over the left posterior iliac spine. Following this a 3 mm incision was made. Trephine bone marrow core(s) was (were) obtained from the [facility]. Bone marrow aspirates were obtained from the [facility]. Aspirates were sent for morphology, immunophenotyping, cytogenetics and molecular diagnostics BCR-ABL. A total of approximately 20 ml of marrow was aspirated. Following this procedure a sterile dressing was applied to the bone marrow biopsy site(s). The patient was placed in the supine position to maintain pressure on the biopsy site. Post-procedure wound care instructions were given.     Complications: NO    Pre-procedural pain: 0 out of 10 on the numeric pain rating scale.     Procedural pain: 0 out of 10 on the numeric pain rating scale. -Sedated    Post-procedural pain assessment: 0 out of 10 on the numeric pain rating scale.     Interventions: NO    Length of procedure:20 minutes or less      Procedure performed by: REANNA Herzog  [phone]  8/20/2022    "

## 2022-08-22 LAB — TESTOST SERPL-MCNC: 266 NG/DL (ref 240–950)

## 2022-08-22 NOTE — PROGRESS NOTES
This is a recent snapshot of the patient's [facility] medical record.  For current drug dose and complete information and questions, call [phone] or In [facility] pool, fv home infusion ([id number])  CSN Number:  [id number]

## 2022-08-22 NOTE — PROGRESS NOTES
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    HPI:   Nik returns for follow up over video visit, accompanied by his wife Lamar.   Follows with ophthalmology Scleral lens of the right eye, 3-4 times eye drops, has mouth sore on left side of the cheek stable in size per patient, no other ulcers, still chewing nicotine gums cutting down, No rashes or dry skin, abdominal pain, nausea/vomiting, good appetite, or diarrhea. No bleeding, no  concerns.      Review of Systems                                                                                                                                         10 point Review of systems was negative     PHYSICAL EXAM                                                                                                                                                 KPS: 80  There were no vitals taken for this visit.    Wt Readings from Last 4 Encounters:   08/18/22 112.5 kg (248 lb)   08/18/22 112.3 kg (247 lb 8 oz)   08/18/22 112.9 kg (248 lb 12.8 oz)   07/26/22 108 kg (238 lb 3.2 oz)      Per last in person visit:     General: NAD.  HEENT: sclera anicteric. Oropharynx with very mild lichenoid changes on bilateral inner cheeks, and left buccal ulcer.  No ulcerations.  Lungs:  CTA b/l  no wheezes  CV: RRR  no gallop  Abd; soft no tenderness; BS nl   Ext/ Skin: Hyperpigmentation noted on torso, back and anterior pelvis.  LE Trace edema, no skin thickening.    cGVHD therapy started on February 24 2022  - Baseline NIH scoring 2/24/2022 : skin 2 maculopapular rash/erythema with no sclerotic features, mouth score 1 mild symptoms with lichenoid changes less than 25%, eyes score 2 moderate symptoms without new visual impairments due to KCS requiring lubricant eyedrops more than 3 times a day, overall mild scale for her provider  - 3/25/2022 1 month [facility] treatment assessment on PQRST: skin 2,  mouth score 1 mild symptoms with NO lichenoid changes, eyes score 2 moderate symptoms w requiring lubricant eyedrops more than 3 times a day, liver score 1 ALT 3.7x ULN and nl bilirubin   - 3/31  Mouth clear; eyes minimal LFT still abn; no joint or new GI sx  - 4/28 Mouth clear, Left>R eye is mild sclera erythema, lids are pink and irritated appearing, LFT's slow improvement, no new joint, skin, or GI symptoms.   -5/11 mouth with mild erythema but no lichenoid changes, eyes using eyedrops 4-6 times a day score of 2, LFTs significantly improved from baseline slight elevation in alk phos and AST with normal bilirubin, skin with hyperpigmentation from old acute GVHD no active skin rashes, no GI symptoms no musculoskeletal complaints.  -5/26 Mouth with very slight lichenoid changes, erythema.  Eyes still using eyedrops 4-6x per day.  LFTs essentially normal with slight elevation in alk phos.  Skin with hyperpigmentation from old acute GVHD, no active rashes, no GI or MSK concerns.  Skin 0, mouth 0 but with lichenoid changes, eyes 2  -6/7/22 Mouth with no lichenoid changes, erythema.  Eyes still using eyedrops 4-6x per day.  LFTs essentially normal with slight elevation in alk phos.  Skin with hyperpigmentation from old acute GVHD, no active rashes, no GI or MSK concerns.  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  Acute lymphoblastic leukemia, [place] [facility] chromosome positive in CR, MRD neg. S/p allo sib PBSCT. (ABO matched)  HCT-CI score: 3 (prior solid tumor)  Day +100 bone marrow biopsy is 100% donor with no morphologic or flow cytometric evidence of leukemia BCR abl is pending.  - Day +180 restaging BM bx- still in CR  100% donor;  2/16/22 BCR ABL major breakpoint undetectable.   - 1 year restaging  8/18/2022: Markedly hypocellular bone marrow [less than 10% cellular] with maturing trilineage hematopoiesis and no definite morphologic or immunophenotypic evidence for involvement by B lymphoblastic leukemia. BCRABL1 PCR not detected, bone marrow % donor. FISH/CG/ pending.  - Discussed with the patient maintenance with TKI posttransplantation given his Ph positive ALL that have not been started previously presumed secondary to multiple active issues specifically infections and COVID.  We will reconsider this patient will think about whether this is something he would like to consider he was previously on Dasatinib, we would start on a low dose and increase as tolerated.    2.  HEME:  - Transfuse for hgb <7g/dL; plt < 10k.  No transfusions needs  - Pulmonary emboli: He developed a pulmonary emboli in the setting of receiving PEG asparaginase (ie provoked thrombus).  Xarelto complete.   - Counts are tolerating valcyte well.     3.  FEN/Renal:  - Cr improved.   - Magnesium WNL today, was hypomag likely due to tac.  Continue PO magnesium supplementation for now  - He has a history of renal cancer and resection. Has a single kidney.    4.    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   #Skin GVHD- history of biopsy proven GVHD of the skin 8/30/2021; resolved.   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 Mild flares with transaminitis  # Ocular GVHD: follows with ophthalmology. Maxitrol to lids, continue refreshing drops QID. Score 1-2  # Oral GVHD: dex s/s three times a day; tac ointment to lips as needed.   - Cont tac to 1mg BID. previously decided to stay on same dose, no checks of levels if clinically stable, and no need switch to sirolimus. (keep tac low as gvhd is quiet and viremia). 5/10 level 45 with starting of COVID therapy and D-D intractions (Paxlovid for COVID19, which has a drug interaction with tacrolimus-Ritonavir increases  serum levels of tacrolimus).   Tacrolimus level subtherapeutic, increase to 2.5 mg twice daily recently, 8/23/2022 instructed to change tacrolimus to 2 mg twice daily when he starts posaconazole and will repeat levels on Monday knows to hold dose    3/1-3/16: prednisone 100mg every day  3/17 75mg every day   3/31 75 alt with 50  4/6: 75 alt with 25mg every other day  4/12 pred tapered to 60 alternating with 25mg   4/15 In setting of viruses trying to reactivate,GVHD stable: Taper 60/15mg alternating.  4/20 decrease pred further to 50/10.    4/25 taper pred to 50/0 every other day as long as symptoms stable.   5/2 Pred decrease 40/0 alternating every other day.   5/13 decrease to 30/0  5/20 decrease to 20/0 then slowly by 5 mg weekly  6/7 decrease to 10/0  Went up to 15/0 with mild increased LFTs  8/23/2022 increased to 20/0, with persistence of oral ulcers and active ocular GVHD.  Discussed with the patient the importance of stopping chewing of nicotine gums for prolonged hours as that would not help with the healing of the oral ulcers.  I discussed with the patient alternatives of nicotine patches that he will reconsider and let me know.    5.  ID: Afebrile   #COVID   Positive via home test 5/8. Treated with Paxlovid with resolution of symptoms.  Had recurrence on 5/18.  Now asymptomatic.    #Pulmonary infiltrates:   4/20 CT chest with new RUL infiltrate. Highly immunocompromised. 4/22 Fungitell/asp GM were neg. BAL 4/29 NGTD, increased micafungin to 150mg IV daily-- f/u 6/1 Dr Garcia CT with mixed results, followed with Dr. Garcia August 2022 with resolution of pulmonary nodules and normalization of LFTs we will switch patient will switch patient to posaconazole prophylactic dose and monitor LFTs and any infectious concerns closely.    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 4/15 started valcyte 900mg PO BID. 4/20 CMV <137, 5/10 ND, decreased to 450mg BID 4/30 - continue 4 weeks as long as CMV <137 till 5/28 +  weekly CMV  - Switch to acyclovir after completion of current therapy 5/28. 8/18 CMV ND. Check monthly on IST    - PPx: ACV, micafungin 150mg daily. Pentamidine given 5/13/2022 did not tolerate atovaqone, overdue we will schedule on 8/29. Azithro 250mg daily (stopped levaquin due to possible tendonitis).   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 S/p 4/22 and 5/4/22 rituximab 375mg/m2 5/10 ND x2, 6/7 ND, 8/18/2022 971, check every other month on IST  S/p covid vaccine series 12/2021  S/p evusheld  Deferred annual vaccines in the setting of GVHD and immunosuppression therapy, discussed with patient    6. Endo: Hx of graves disease; On synthroid 175 mcg daily. TSH normal 4/6/2022 no reflex to T4.     7. CV: Norvasc: 2.5mg.    8. GI:   -Omeprazole for heartburn  -LFTs as above, now normal, continue ursodiol twice daily, monitor as we start posaconazole and consider checking posaconazole levels.  Will defer starting statins for hyperlipidemia given recent transaminitis and start of posaconazole.  Discussed with the patient.    9. Psych:   - Situational anxiety - lexapro 10mg daily.   - Insomnia: worse on steroids. Ativan, trazadone, melatonin    10. MSK: left food drop, PT. Occasional muscle cramps discussed optimizing vitamin D levels and considering vitamin D, some of the symptomatology of muscle cramps are likely related to chronic GVHD.    11. HCM/Age appropriate cancer screening:  -Discussed with the patient importance of age-appropriate cancer screening including colonoscopies, he is due for 1.  -Discussed importance of at least once a year vitamin D level check, 8/2020 236 low normal will start calcium and vitamin D replacement and recheck levels in 2 to 3 months  -Screening for secondary iron overload, 8/2022 ferritin 1023 we will recheck in 2-3 months  -Needs yearly thyroid check and lipid profile check, 8/2022 cholesterol 290 elevated, triglycerides 146 and LDL elevated at 162 discussed with  the patient he was on statins prior to transplantation that needs to be restarted [place] will hold off given start of posaconazole and recent transaminitis and LFT abnormalities related to GVHD see GI section above  -Will obtain DEXA scan as well as PFTs.  -Metabolic other, 8/2022 testosterone within normal    Plan:    - Add posaconazole 300 mg daily, tacrolimus 2mg BID, check level and lad Monday, increase prednisone to 20/0, ursodiol BID  - Am labs on Monday 8/29, knows to hold tacro, pentamidine, PFTs and dexa scan if possible to schedule all same day, requested  - RTC with me on 9/6 and prior labs

## 2022-08-23 ENCOUNTER — HOME INFUSION (PRE-WILLOW HOME INFUSION) (OUTPATIENT)
Dept: PHARMACY | Facility: CLINIC | Age: 62
End: 2022-08-23

## 2022-08-23 ENCOUNTER — VIRTUAL VISIT (OUTPATIENT)
Dept: TRANSPLANT | Facility: CLINIC | Age: 62
End: 2022-08-23
Attending: INTERNAL MEDICINE
Payer: COMMERCIAL

## 2022-08-23 DIAGNOSIS — Z94.81 STATUS POST BONE MARROW TRANSPLANT (H): Primary | ICD-10-CM

## 2022-08-23 PROCEDURE — G0463 HOSPITAL OUTPT CLINIC VISIT: HCPCS | Mod: PN,RTG | Performed by: INTERNAL MEDICINE

## 2022-08-23 PROCEDURE — 99215 OFFICE O/P EST HI 40 MIN: CPT | Mod: 95 | Performed by: INTERNAL MEDICINE

## 2022-08-23 RX ORDER — POSACONAZOLE 100 MG/1
300 TABLET, DELAYED RELEASE ORAL EVERY MORNING
Qty: 90 TABLET | Refills: 3 | Status: SHIPPED | OUTPATIENT
Start: 2022-08-23 | End: 2022-12-13

## 2022-08-23 NOTE — PROGRESS NOTES
Nik is a 62 year old who is being evaluated via a billable video visit.      If the video visit is dropped, the invitation should be resent by: Send to e-mail at: [email or web address]  Will anyone else be joining your video visit? Yes, wife will be w/ Pt.        Video-Visit Details    Video Start Time: 9:55  Type of service:  Video Visit    Video End Time: 10: 37    Originating Location (pt. Location): Home    Distant Location (provider location):  [facility] [place]     Platform used for Video Visit: Login[employer]

## 2022-08-23 NOTE — LETTER
8/23/2022         RE: Nik Nava  [address] [facility] [id number]        Dear Colleague,    Thank you for referring your patient, Nik Nava, to the [facility] [place]. Please see a copy of my visit note below.      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    HPI:   Nik returns for follow up over video visit, accompanied by his wife Lamar.   Follows with ophthalmology Scleral lens of the right eye, 3-4 times eye drops, has mouth sore on left side of the cheek stable in size per patient, no other ulcers, still chewing nicotine gums cutting down, No rashes or dry skin, abdominal pain, nausea/vomiting, good appetite, or diarrhea. No bleeding, no  concerns.      Review of Systems                                                                                                                                         10 point Review of systems was negative     PHYSICAL EXAM                                                                                                                                                 KPS: 80  There were no vitals taken for this visit.    Wt Readings from Last 4 Encounters:   08/18/22 112.5 kg (248 lb)   08/18/22 112.3 kg (247 lb 8 oz)   08/18/22 112.9 kg (248 lb 12.8 oz)   07/26/22 108 kg (238 lb 3.2 oz)      Per last in person visit:     General: NAD.  HEENT: sclera anicteric. Oropharynx with very mild lichenoid changes on bilateral inner cheeks, and left buccal ulcer.  No ulcerations.  Lungs:  CTA b/l  no wheezes  CV: RRR  no gallop  Abd; soft no tenderness; BS nl   Ext/ Skin: Hyperpigmentation noted on torso, back and anterior pelvis.  LE Trace edema, no skin thickening.    cGVHD therapy started on February 24 2022  - Baseline NIH scoring 2/24/2022 : skin 2 maculopapular rash/erythema with no sclerotic features, mouth score  1 mild symptoms with lichenoid changes less than 25%, eyes score 2 moderate symptoms without new visual impairments due to KCS requiring lubricant eyedrops more than 3 times a day, overall mild scale for her provider  - 3/25/2022 1 month [facility] treatment assessment on PQRST: skin 2, mouth score 1 mild symptoms with NO lichenoid changes, eyes score 2 moderate symptoms w requiring lubricant eyedrops more than 3 times a day, liver score 1 ALT 3.7x ULN and nl bilirubin   - 3/31  Mouth clear; eyes minimal LFT still abn; no joint or new GI sx  - 4/28 Mouth clear, Left>R eye is mild sclera erythema, lids are pink and irritated appearing, LFT's slow improvement, no new joint, skin, or GI symptoms.   -5/11 mouth with mild erythema but no lichenoid changes, eyes using eyedrops 4-6 times a day score of 2, LFTs significantly improved from baseline slight elevation in alk phos and AST with normal bilirubin, skin with hyperpigmentation from old acute GVHD no active skin rashes, no GI symptoms no musculoskeletal complaints.  -5/26 Mouth with very slight lichenoid changes, erythema.  Eyes still using eyedrops 4-6x per day.  LFTs essentially normal with slight elevation in alk phos.  Skin with hyperpigmentation from old acute GVHD, no active rashes, no GI or MSK concerns.  Skin 0, mouth 0 but with lichenoid changes, eyes 2  -6/7/22 Mouth with no lichenoid changes, erythema.  Eyes still using eyedrops 4-6x per day.  LFTs essentially normal with slight elevation in alk phos.  Skin with hyperpigmentation from old acute GVHD, no active rashes, no GI or MSK concerns.  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  Acute lymphoblastic leukemia, [place] chromosome positive in CR, MRD neg. S/p allo sib PBSCT. (ABO  matched)  HCT-CI score: 3 (prior solid tumor)  Day +100 bone marrow biopsy is 100% donor with no morphologic or flow cytometric evidence of leukemia BCR abl is pending.  - Day +180 restaging BM bx- still in CR  100% donor;  2/16/22 BCR ABL major breakpoint undetectable.   - 1 year restaging 8/18/2022: Markedly hypocellular bone marrow [less than 10% cellular] with maturing trilineage hematopoiesis and no definite morphologic or immunophenotypic evidence for involvement by B lymphoblastic leukemia. BCRABL1 PCR not detected, bone marrow % donor. FISH/CG/ pending.  - Discussed with the patient maintenance with TKI posttransplantation given his Ph positive ALL that have not been started previously presumed secondary to multiple active issues specifically infections and COVID.  We will reconsider this patient will think about whether this is something he would like to consider he was previously on Dasatinib, we would start on a low dose and increase as tolerated.    2.  HEME:  - Transfuse for hgb <7g/dL; plt < 10k.  No transfusions needs  - Pulmonary emboli: He developed a pulmonary emboli in the setting of receiving PEG asparaginase (ie provoked thrombus).  Xarelto complete.   - Counts are tolerating valcyte well.     3.  FEN/Renal:  - Cr improved.   - Magnesium WNL today, was hypomag likely due to tac.  Continue PO magnesium supplementation for now  - He has a history of renal cancer and resection. Has a single kidney.    4.    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   #Skin GVHD- history of biopsy proven GVHD of the skin 8/30/2021; resolved.   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 Mild flares with transaminitis  # Ocular GVHD: follows with ophthalmology. Maxitrol to lids, continue refreshing drops QID. Score 1-2  # Oral GVHD: dex s/s three times a day; tac ointment to lips as needed.   - Cont tac to 1mg BID. previously decided to  stay on same dose, no checks of levels if clinically stable, and no need switch to sirolimus. (keep tac low as gvhd is quiet and viremia). 5/10 level 45 with starting of COVID therapy and D-D intractions (Paxlovid for COVID19, which has a drug interaction with tacrolimus-Ritonavir increases serum levels of tacrolimus).   Tacrolimus level subtherapeutic, increase to 2.5 mg twice daily recently, 8/23/2022 instructed to change tacrolimus to 2 mg twice daily when he starts posaconazole and will repeat levels on Monday knows to hold dose    3/1-3/16: prednisone 100mg every day  3/17 75mg every day   3/31 75 alt with 50  4/6: 75 alt with 25mg every other day  4/12 pred tapered to 60 alternating with 25mg   4/15 In setting of viruses trying to reactivate,GVHD stable: Taper 60/15mg alternating.  4/20 decrease pred further to 50/10.    4/25 taper pred to 50/0 every other day as long as symptoms stable.   5/2 Pred decrease 40/0 alternating every other day.   5/13 decrease to 30/0  5/20 decrease to 20/0 then slowly by 5 mg weekly  6/7 decrease to 10/0  Went up to 15/0 with mild increased LFTs  8/23/2022 increased to 20/0, with persistence of oral ulcers and active ocular GVHD.  Discussed with the patient the importance of stopping chewing of nicotine gums for prolonged hours as that would not help with the healing of the oral ulcers.  I discussed with the patient alternatives of nicotine patches that he will reconsider and let me know.    5.  ID: Afebrile   #COVID   Positive via home test 5/8. Treated with Paxlovid with resolution of symptoms.  Had recurrence on 5/18.  Now asymptomatic.    #Pulmonary infiltrates:   4/20 CT chest with new RUL infiltrate. Highly immunocompromised. 4/22 Fungitell/asp GM were neg. BAL 4/29 NGTD, increased micafungin to 150mg IV daily-- f/u 6/1 Dr Garcia CT with mixed results, followed with Dr. Garcia August 2022 with resolution of pulmonary nodules and normalization of LFTs we will switch patient  will switch patient to posaconazole prophylactic dose and monitor LFTs and any infectious concerns closely.    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 4/15 started valcyte 900mg PO BID. 4/20 CMV <137, 5/10 ND, decreased to 450mg BID 4/30 - continue 4 weeks as long as CMV <137 till 5/28 + weekly CMV  - Switch to acyclovir after completion of current therapy 5/28. 8/18 CMV ND. Check monthly on IST    - PPx: ACV, micafungin 150mg daily. Pentamidine given 5/13/2022 did not tolerate atovaqone, overdue we will schedule on 8/29. Azithro 250mg daily (stopped levaquin due to possible tendonitis).   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 S/p 4/22 and 5/4/22 rituximab 375mg/m2 5/10 ND x2, 6/7 ND, 8/18/2022 971, check every other month on IST  S/p covid vaccine series 12/2021  S/p evusheld  Deferred annual vaccines in the setting of GVHD and immunosuppression therapy, discussed with patient    6. Endo: Hx of graves disease; On synthroid 175 mcg daily. TSH normal 4/6/2022 no reflex to T4.     7. CV: Norvasc: 2.5mg.    8. GI:   -Omeprazole for heartburn  -LFTs as above, now normal, continue ursodiol twice daily, monitor as we start posaconazole and consider checking posaconazole levels.  Will defer starting statins for hyperlipidemia given recent transaminitis and start of posaconazole.  Discussed with the patient.    9. Psych:   - Situational anxiety - lexapro 10mg daily.   - Insomnia: worse on steroids. Ativan, trazadone, melatonin    10. MSK: left food drop, PT. Occasional muscle cramps discussed optimizing vitamin D levels and considering vitamin D, some of the symptomatology of muscle cramps are likely related to chronic GVHD.    11. HCM/Age appropriate cancer screening:  -Discussed with the patient importance of age-appropriate cancer screening including colonoscopies, he is due for 1.  -Discussed importance of at least once a year vitamin D level check, 8/2020 236 low normal will start calcium and  vitamin D replacement and recheck levels in 2 to 3 months  -Screening for secondary iron overload, 8/2022 ferritin 1023 we will recheck in 2-3 months  -Needs yearly thyroid check and lipid profile check, 8/2022 cholesterol 290 elevated, triglycerides 146 and LDL elevated at 162 discussed with the patient he was on statins prior to transplantation that needs to be restarted [place] will hold off given start of posaconazole and recent transaminitis and LFT abnormalities related to GVHD see GI section above  -Will obtain DEXA scan as well as PFTs.  -Metabolic other, 8/2022 testosterone within normal    Plan:    - Add posaconazole 300 mg daily, tacrolimus 2mg BID, check level and lad Monday, increase prednisone to 20/0, ursodiol BID  - Am labs on Monday 8/29, knows to hold tacro, pentamidine, PFTs and dexa scan if possible to schedule all same day, requested  - RTC with me on 9/6 and prior labs          Sincerely,    Akshat Tobar MD

## 2022-08-24 NOTE — PROGRESS NOTES
DISCHARGE SUMMARY    Nik Nava was seen 3 times for evaluation and treatment.  Patient did not return for further treatment and current status is unknown.  Due to short treatment duration, no objective or functional changes were made.  Please see goal flow sheet from episode noted date below and initial evaluation for further information.  Patient is discharged from therapy and therapy episode is resolved as of 08/24/22.      Linked Episodes   Type: Episode: Status: Noted: Resolved: Last update: Updated by:   PHYSICAL THERAPY general weakness after luekemia 4/26/22 Active 4/26/2022 5/17/2022  3:42 PM Waqar Abdi, PT      Comments:

## 2022-08-25 NOTE — PROGRESS NOTES
Referred by: CORNEL Vivar*; Medical Diagnosis (from order):    Diagnosis Information      Diagnosis    959.7 (ICD-9-CM) - S89.91XA (ICD-10-CM) - Injury of right knee, initial encounter             Physical Therapy -  Daily Treatment Note    Visit:  2     SUBJECTIVE                                                                                                             Pt reports doing better than last week, still has burning on the inside of the knee.    Functional Change: Reports feeling 80% back to normal       Pain / Symptoms:  Pain rating (out of 10): Current: 1     OBJECTIVE                                                                                                                          TREATMENT                                                                                                                  Therapeutic Exercise:  Bike warm up - 5 mins   SLR x 10 - 2.5#  Single leg press 3 x 10 - 70#  Low squat position & kneeling   4\" step downs x 10 B     Therapeutic Activity:  Time spent discussing with patient his progress at this point and discussing his ability to return to work related tasks I.e. getting out of equipment, climbing up inclines, squatting, etc.     Home Exercise Program: (*above indicates provided as part of home exercise program)  SLR  Supine or stair flexion   Mini squats   Seated HS stretch   Ice       ASSESSMENT                                                                                                             Pt continues to show good improvement - had no tenderness today over medial knee or hamstring tendons. Good strength with eccentric step downs, single leg press, squatting, etc. With no increase in symptoms. Had no burning sensation today with work or exercises.     Pain/symptoms after session: 0       Procedures and total treatment time documented Time Entry flowsheet.     This is a recent snapshot of the patient's [facility] medical record.  For current drug dose and complete information and questions, call [phone] or In [facility] pool, fv home infusion ([id number])  CSN Number:  [id number]

## 2022-08-26 LAB
CULTURE HARVEST COMPLETE DATE: NORMAL
CULTURE HARVEST COMPLETE DATE: NORMAL
INTERPRETATION: NORMAL

## 2022-08-27 NOTE — PROGRESS NOTES
This is a recent snapshot of the patient's [facility] medical record.  For current drug dose and complete information and questions, call [phone] or In [facility] pool, fv home infusion ([id number])  CSN Number:  [id number]

## 2022-08-29 ENCOUNTER — LAB (OUTPATIENT)
Dept: LAB | Facility: CLINIC | Age: 62
End: 2022-08-29
Attending: INTERNAL MEDICINE
Payer: COMMERCIAL

## 2022-08-29 DIAGNOSIS — Z94.81 STATUS POST BONE MARROW TRANSPLANT (H): ICD-10-CM

## 2022-08-29 LAB
ALBUMIN SERPL-MCNC: 3.3 G/DL (ref 3.4–5)
ALP SERPL-CCNC: 118 U/L (ref 40–150)
ALT SERPL W P-5'-P-CCNC: 34 U/L (ref 0–70)
ANION GAP SERPL CALCULATED.3IONS-SCNC: 7 MMOL/L (ref 3–14)
AST SERPL W P-5'-P-CCNC: 33 U/L (ref 0–45)
BASOPHILS # BLD AUTO: 0 10E3/UL (ref 0–0.2)
BASOPHILS NFR BLD AUTO: 0 %
BILIRUB SERPL-MCNC: 0.8 MG/DL (ref 0.2–1.3)
BUN SERPL-MCNC: 47 MG/DL (ref 7–30)
CALCIUM SERPL-MCNC: 9.6 MG/DL (ref 8.5–10.1)
CHLORIDE BLD-SCNC: 106 MMOL/L (ref 94–109)
CO2 SERPL-SCNC: 26 MMOL/L (ref 20–32)
CREAT SERPL-MCNC: 1.15 MG/DL (ref 0.66–1.25)
EOSINOPHIL # BLD AUTO: 0 10E3/UL (ref 0–0.7)
EOSINOPHIL NFR BLD AUTO: 0 %
ERYTHROCYTE [DISTWIDTH] IN BLOOD BY AUTOMATED COUNT: 15.1 % (ref 10–15)
GFR SERPL CREATININE-BSD FRML MDRD: 71 ML/MIN/1.73M2
GLUCOSE BLD-MCNC: 102 MG/DL (ref 70–99)
HCT VFR BLD AUTO: 38.2 % (ref 40–53)
HGB BLD-MCNC: 13.4 G/DL (ref 13.3–17.7)
IMM GRANULOCYTES # BLD: 0.1 10E3/UL
IMM GRANULOCYTES NFR BLD: 1 %
LYMPHOCYTES # BLD AUTO: 2.4 10E3/UL (ref 0.8–5.3)
LYMPHOCYTES NFR BLD AUTO: 16 %
MAGNESIUM SERPL-MCNC: 1.7 MG/DL (ref 1.6–2.3)
MCH RBC QN AUTO: 35.3 PG (ref 26.5–33)
MCHC RBC AUTO-ENTMCNC: 35.1 G/DL (ref 31.5–36.5)
MCV RBC AUTO: 101 FL (ref 78–100)
MONOCYTES # BLD AUTO: 1.3 10E3/UL (ref 0–1.3)
MONOCYTES NFR BLD AUTO: 8 %
NEUTROPHILS # BLD AUTO: 11.8 10E3/UL (ref 1.6–8.3)
NEUTROPHILS NFR BLD AUTO: 75 %
NRBC # BLD AUTO: 0 10E3/UL
NRBC BLD AUTO-RTO: 0 /100
PLATELET # BLD AUTO: 133 10E3/UL (ref 150–450)
POTASSIUM BLD-SCNC: 4.7 MMOL/L (ref 3.4–5.3)
PROT SERPL-MCNC: 6.5 G/DL (ref 6.8–8.8)
RBC # BLD AUTO: 3.8 10E6/UL (ref 4.4–5.9)
SODIUM SERPL-SCNC: 139 MMOL/L (ref 133–144)
TACROLIMUS BLD-MCNC: 8.5 UG/L (ref 5–15)
TME LAST DOSE: NORMAL H
TME LAST DOSE: NORMAL H
WBC # BLD AUTO: 15.7 10E3/UL (ref 4–11)

## 2022-08-29 PROCEDURE — 80187 DRUG ASSAY POSACONAZOLE: CPT

## 2022-08-29 PROCEDURE — 80197 ASSAY OF TACROLIMUS: CPT

## 2022-08-29 PROCEDURE — 80053 COMPREHEN METABOLIC PANEL: CPT

## 2022-08-29 PROCEDURE — 83735 ASSAY OF MAGNESIUM: CPT

## 2022-08-29 PROCEDURE — 250N000011 HC RX IP 250 OP 636: Performed by: INTERNAL MEDICINE

## 2022-08-29 PROCEDURE — 85004 AUTOMATED DIFF WBC COUNT: CPT

## 2022-08-29 PROCEDURE — 36591 DRAW BLOOD OFF VENOUS DEVICE: CPT

## 2022-08-29 RX ORDER — HEPARIN SODIUM (PORCINE) LOCK FLUSH IV SOLN 100 UNIT/ML 100 UNIT/ML
500 SOLUTION INTRAVENOUS ONCE
Status: COMPLETED | OUTPATIENT
Start: 2022-08-29 | End: 2022-08-29

## 2022-08-29 RX ADMIN — Medication 500 UNITS: at 08:28

## 2022-08-29 NOTE — NURSING NOTE
"Chief Complaint   Patient presents with     Port Draw     Labs drawn via port by RN in lab.  VS taken       Port accessed with 20 gauge 3/4\" gripper needle by RN, labs collected, line flushed with saline and heparin, then de-accessed.    Rajni Menezes RN      "

## 2022-08-30 LAB
INTERPRETATION: NORMAL
POSACONAZOLE SERPL-MCNC: 2.5 UG/ML (ref 0.7–5)

## 2022-09-06 ENCOUNTER — ANCILLARY PROCEDURE (OUTPATIENT)
Dept: BONE DENSITY | Facility: CLINIC | Age: 62
End: 2022-09-06
Attending: INTERNAL MEDICINE
Payer: COMMERCIAL

## 2022-09-06 ENCOUNTER — ONCOLOGY VISIT (OUTPATIENT)
Dept: TRANSPLANT | Facility: CLINIC | Age: 62
End: 2022-09-06
Attending: INTERNAL MEDICINE
Payer: COMMERCIAL

## 2022-09-06 ENCOUNTER — LAB (OUTPATIENT)
Dept: LAB | Facility: CLINIC | Age: 62
End: 2022-09-06
Attending: INTERNAL MEDICINE
Payer: COMMERCIAL

## 2022-09-06 VITALS
DIASTOLIC BLOOD PRESSURE: 88 MMHG | RESPIRATION RATE: 16 BRPM | OXYGEN SATURATION: 97 % | SYSTOLIC BLOOD PRESSURE: 150 MMHG | TEMPERATURE: 97.9 F | WEIGHT: 247.8 LBS | BODY MASS INDEX: 32.69 KG/M2 | HEART RATE: 77 BPM

## 2022-09-06 DIAGNOSIS — Z94.81 STATUS POST BONE MARROW TRANSPLANT (H): ICD-10-CM

## 2022-09-06 DIAGNOSIS — C91.01 ACUTE LYMPHOBLASTIC LEUKEMIA (ALL) IN REMISSION (H): ICD-10-CM

## 2022-09-06 DIAGNOSIS — Z94.84 HISTORY OF PERIPHERAL STEM CELL TRANSPLANT (H): ICD-10-CM

## 2022-09-06 DIAGNOSIS — B49 FUNGAL PNEUMONIA: Primary | ICD-10-CM

## 2022-09-06 DIAGNOSIS — T86.09 GVHD AS COMPLICATION OF BONE MARROW TRANSPLANT (H): ICD-10-CM

## 2022-09-06 DIAGNOSIS — D89.813 GVHD AS COMPLICATION OF BONE MARROW TRANSPLANT (H): ICD-10-CM

## 2022-09-06 DIAGNOSIS — J16.8 FUNGAL PNEUMONIA: Primary | ICD-10-CM

## 2022-09-06 DIAGNOSIS — Z00.6 EXAMINATION OF PARTICIPANT IN CLINICAL TRIAL: Primary | ICD-10-CM

## 2022-09-06 LAB
ALBUMIN SERPL BCG-MCNC: 4 G/DL (ref 3.5–5.2)
ALP SERPL-CCNC: 113 U/L (ref 40–129)
ALT SERPL W P-5'-P-CCNC: 26 U/L (ref 10–50)
ANION GAP SERPL CALCULATED.3IONS-SCNC: 13 MMOL/L (ref 7–15)
AST SERPL W P-5'-P-CCNC: 33 U/L (ref 10–50)
BASOPHILS # BLD AUTO: 0 10E3/UL (ref 0–0.2)
BASOPHILS NFR BLD AUTO: 0 %
BILIRUB SERPL-MCNC: 0.6 MG/DL
BUN SERPL-MCNC: 52.4 MG/DL (ref 8–23)
CALCIUM SERPL-MCNC: 10.1 MG/DL (ref 8.8–10.2)
CHLORIDE SERPL-SCNC: 101 MMOL/L (ref 98–107)
CREAT SERPL-MCNC: 1.31 MG/DL (ref 0.67–1.17)
DEPRECATED HCO3 PLAS-SCNC: 23 MMOL/L (ref 22–29)
EOSINOPHIL # BLD AUTO: 0 10E3/UL (ref 0–0.7)
EOSINOPHIL NFR BLD AUTO: 0 %
ERYTHROCYTE [DISTWIDTH] IN BLOOD BY AUTOMATED COUNT: 15.1 % (ref 10–15)
GFR SERPL CREATININE-BSD FRML MDRD: 61 ML/MIN/1.73M2
GLUCOSE SERPL-MCNC: 124 MG/DL (ref 70–99)
HCT VFR BLD AUTO: 37.8 % (ref 40–53)
HGB BLD-MCNC: 13.3 G/DL (ref 13.3–17.7)
IMM GRANULOCYTES # BLD: 0.3 10E3/UL
IMM GRANULOCYTES NFR BLD: 3 %
LYMPHOCYTES # BLD AUTO: 1.9 10E3/UL (ref 0.8–5.3)
LYMPHOCYTES NFR BLD AUTO: 22 %
MAGNESIUM SERPL-MCNC: 1.6 MG/DL (ref 1.7–2.3)
MCH RBC QN AUTO: 35.4 PG (ref 26.5–33)
MCHC RBC AUTO-ENTMCNC: 35.2 G/DL (ref 31.5–36.5)
MCV RBC AUTO: 101 FL (ref 78–100)
MONOCYTES # BLD AUTO: 0.4 10E3/UL (ref 0–1.3)
MONOCYTES NFR BLD AUTO: 5 %
NEUTROPHILS # BLD AUTO: 6.1 10E3/UL (ref 1.6–8.3)
NEUTROPHILS NFR BLD AUTO: 70 %
NRBC # BLD AUTO: 0 10E3/UL
NRBC BLD AUTO-RTO: 0 /100
PLATELET # BLD AUTO: 150 10E3/UL (ref 150–450)
POTASSIUM SERPL-SCNC: 5.7 MMOL/L (ref 3.4–5.3)
PROT SERPL-MCNC: 6.5 G/DL (ref 6.4–8.3)
RBC # BLD AUTO: 3.76 10E6/UL (ref 4.4–5.9)
SODIUM SERPL-SCNC: 137 MMOL/L (ref 136–145)
TACROLIMUS BLD-MCNC: 8.9 UG/L (ref 5–15)
TME LAST DOSE: NORMAL H
TME LAST DOSE: NORMAL H
WBC # BLD AUTO: 8.7 10E3/UL (ref 4–11)

## 2022-09-06 PROCEDURE — 36591 DRAW BLOOD OFF VENOUS DEVICE: CPT | Performed by: INTERNAL MEDICINE

## 2022-09-06 PROCEDURE — 94640 AIRWAY INHALATION TREATMENT: CPT | Performed by: INTERNAL MEDICINE

## 2022-09-06 PROCEDURE — 36415 COLL VENOUS BLD VENIPUNCTURE: CPT

## 2022-09-06 PROCEDURE — 83735 ASSAY OF MAGNESIUM: CPT | Performed by: INTERNAL MEDICINE

## 2022-09-06 PROCEDURE — 80197 ASSAY OF TACROLIMUS: CPT | Performed by: INTERNAL MEDICINE

## 2022-09-06 PROCEDURE — 99215 OFFICE O/P EST HI 40 MIN: CPT | Performed by: INTERNAL MEDICINE

## 2022-09-06 PROCEDURE — 250N000011 HC RX IP 250 OP 636: Performed by: INTERNAL MEDICINE

## 2022-09-06 PROCEDURE — 85004 AUTOMATED DIFF WBC COUNT: CPT | Performed by: INTERNAL MEDICINE

## 2022-09-06 PROCEDURE — 80053 COMPREHEN METABOLIC PANEL: CPT | Performed by: INTERNAL MEDICINE

## 2022-09-06 PROCEDURE — 77080 DXA BONE DENSITY AXIAL: CPT | Performed by: INTERNAL MEDICINE

## 2022-09-06 PROCEDURE — G0463 HOSPITAL OUTPT CLINIC VISIT: HCPCS

## 2022-09-06 PROCEDURE — 94642 AEROSOL INHALATION TREATMENT: CPT | Performed by: INTERNAL MEDICINE

## 2022-09-06 RX ORDER — DEXAMETHASONE 0.5 MG/5ML
2 ELIXIR ORAL
COMMUNITY
Start: 2022-07-26 | End: 2022-09-06

## 2022-09-06 RX ORDER — TRAZODONE HYDROCHLORIDE 50 MG/1
50 TABLET, FILM COATED ORAL AT BEDTIME
Qty: 30 TABLET | Refills: 3 | Status: SHIPPED | OUTPATIENT
Start: 2022-09-06 | End: 2023-02-23

## 2022-09-06 RX ORDER — AMLODIPINE BESYLATE 5 MG/1
2.5 TABLET ORAL
COMMUNITY
Start: 2022-08-13 | End: 2022-11-22

## 2022-09-06 RX ORDER — LORAZEPAM 1 MG/1
1 TABLET ORAL
Qty: 30 TABLET | Refills: 3 | Status: SHIPPED | OUTPATIENT
Start: 2022-09-06 | End: 2023-01-09

## 2022-09-06 RX ORDER — HEPARIN SODIUM (PORCINE) LOCK FLUSH IV SOLN 100 UNIT/ML 100 UNIT/ML
5 SOLUTION INTRAVENOUS
Status: COMPLETED | OUTPATIENT
Start: 2022-09-06 | End: 2022-09-06

## 2022-09-06 RX ORDER — FLUOROMETHOLONE 0.1 %
1 SUSPENSION, DROPS(FINAL DOSAGE FORM)(ML) OPHTHALMIC (EYE)
COMMUNITY
Start: 2022-08-18 | End: 2022-10-25

## 2022-09-06 RX ORDER — DEXAMETHASONE 0.5 MG/5ML
2 SOLUTION ORAL 4 TIMES DAILY
Qty: 1000 ML | Refills: 3 | Status: SHIPPED | OUTPATIENT
Start: 2022-09-06 | End: 2023-03-29

## 2022-09-06 RX ADMIN — HEPARIN 5 ML: 100 SYRINGE at 13:00

## 2022-09-06 ASSESSMENT — PAIN SCALES - GENERAL: PAINLEVEL: MODERATE PAIN (5)

## 2022-09-06 NOTE — PATIENT INSTRUCTIONS
- Decrease tacrolimus to 1.5mg twice a day  - Labs Friday  - Decrease prednisone to 15 alternating 0mg  - Decrease ursodiol to once daily   - WIll see you on 9/20

## 2022-09-06 NOTE — PROGRESS NOTES
Patient was seen today for a Pentamidine nebulizer tx ordered by Natty Calderón.    Patient was first given 2.5 mg of Albuterol (NDC: 0810-1481-52)  nebulizer, after which Pentamidine 300 mg (Lot # [id number]) (NDC: 63323-877-15) mixed with 6cc Sterile H20 was administered through a filtered nebulizer.    Pre-treatment: SpO2 = 99%   HR = 75   BBS = Clear    Post-treatment: SpO2 = 98%  HR = 79  BBS =Clear    No adverse side effects noted by the patient.      This service today was provided under the direct supervision of Dr. Willis, who was available if needed.         Procedure was completed by RT Harsha.

## 2022-09-06 NOTE — PROGRESS NOTES
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    HPI:   Nik returns for follow up, accompanied by his wife Lamar. Reports mid paraspinal pain more on the right, he said he has had back pains for years that he sees chiropractor for, denies any bony tenderness denies any radiation singling numbness or weakness.  Reports improvement in ocular symptoms, after ophthalmology recommended scleral lens of the right eye, using 3-4 times eye drops, has mouth sore on left side of the cheek improving, cut down on his chewing of nicotine gums since we last talked, no other new ulcers or concerns, good oral intake, No rashes or dry skin, abdominal pain, nausea/vomiting, good appetite, or diarrhea. No bleeding, no  concerns.      Review of Systems                                                                                                                                         10 point Review of systems was negative     PHYSICAL EXAM                                                                                                                                                 KPS: 80  BP (!) 150/88 (BP Location: Right arm, Patient Position: Sitting, Cuff Size: Adult Large)   Pulse 77   Temp 97.9  F (36.6  C) (Oral)   Resp 16   Wt 112.4 kg (247 lb 12.8 oz)   SpO2 97%   BMI 32.69 kg/m      Wt Readings from Last 4 Encounters:   09/06/22 112.4 kg (247 lb 12.8 oz)   08/18/22 112.5 kg (248 lb)   08/18/22 112.3 kg (247 lb 8 oz)   08/18/22 112.9 kg (248 lb 12.8 oz)      General: NAD.  HEENT: sclera anicteric. Oropharynx with very mild lichenoid changes on bilateral inner cheeks, and left buccal ulcer-smaller and overall healing.  No ulcerations.  Lungs:  CTA b/l  no wheezes  CV: RRR  no gallop  Abd; soft no tenderness; BS nl   Ext/ Skin: Hyperpigmentation noted on torso, back and anterior pelvis.  LE Trace edema, no skin  thickening.    cGVHD therapy started on February 24 2022  - Baseline NIH scoring 2/24/2022 : skin 2 maculopapular rash/erythema with no sclerotic features, mouth score 1 mild symptoms with lichenoid changes less than 25%, eyes score 2 moderate symptoms without new visual impairments due to KCS requiring lubricant eyedrops more than 3 times a day, overall mild scale for her provider  - 3/25/2022 1 month NIH treatment assessment on PQRST: skin 2, mouth score 1 mild symptoms with NO lichenoid changes, eyes score 2 moderate symptoms w requiring lubricant eyedrops more than 3 times a day, liver score 1 ALT 3.7x ULN and nl bilirubin   - 3/31  Mouth clear; eyes minimal LFT still abn; no joint or new GI sx  - 4/28 Mouth clear, Left>R eye is mild sclera erythema, lids are pink and irritated appearing, LFT's slow improvement, no new joint, skin, or GI symptoms.   -5/11 mouth with mild erythema but no lichenoid changes, eyes using eyedrops 4-6 times a day score of 2, LFTs significantly improved from baseline slight elevation in alk phos and AST with normal bilirubin, skin with hyperpigmentation from old acute GVHD no active skin rashes, no GI symptoms no musculoskeletal complaints.  -5/26 Mouth with very slight lichenoid changes, erythema.  Eyes still using eyedrops 4-6x per day.  LFTs essentially normal with slight elevation in alk phos.  Skin with hyperpigmentation from old acute GVHD, no active rashes, no GI or MSK concerns.  Skin 0, mouth 0 but with lichenoid changes, eyes 2  -6/7/22 Mouth with no lichenoid changes, erythema.  Eyes still using eyedrops 4-6x per day.  LFTs essentially normal with slight elevation in alk phos.  Skin with hyperpigmentation from old acute GVHD, no active rashes, no GI or MSK concerns.  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 S/p allo sib PBSCT. (ABO matched)  HCT-CI score: 3 (prior solid tumor)  Day +100 bone marrow biopsy is 100% donor with no morphologic or flow cytometric evidence of leukemia BCR abl is pending.  - Day +180 restaging BM bx- still in CR  100% donor;  2/16/22 BCR ABL major breakpoint undetectable.   - 1 year restaging 8/18/2022: Markedly hypocellular bone marrow [less than 10% cellular] with maturing trilineage hematopoiesis and no definite morphologic or immunophenotypic evidence for involvement by B lymphoblastic leukemia. BCRABL1 PCR not detected, bone marrow % donor. FISH NORMAL No evidence of BCR-ABL1 fusion  - Discussed with the patient maintenance with TKI posttransplantation given his Ph positive ALL that have not been started previously presumed secondary to multiple active issues specifically infections and COVID.  We will reconsider this patient will think about whether this is something he would like to consider he was previously on Dasatinib, we would start on a low dose and increase as tolerated.    2.  HEME:  - Transfuse for hgb <7g/dL; plt < 10k.  No transfusions needs  - Pulmonary emboli: He developed a pulmonary emboli in the setting of receiving PEG asparaginase (ie provoked thrombus).  Xarelto complete.   - Counts are tolerating valcyte well.     3.  FEN/Renal:  - Cr improved.   - Magnesium WNL today, was hypomag likely due to tac.  Continue PO magnesium supplementation for now  - He has a history of renal cancer and resection. Has a single kidney.    4.    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   #Skin GVHD- history of biopsy proven GVHD of the skin 8/30/2021; resolved.   # Liver cGVHD biopsy proven 2/21/2022: LFTs are  overall improving despite pred taper. Mild flares with transaminitis  # Ocular GVHD: follows with ophthalmology. Maxitrol to lids, continue refreshing drops QID. Score 1-2  # Oral GVHD: dex s/s three times a day; tac ointment to lips as needed.   - Cont tac to 1mg BID. previously decided to stay on same dose, no checks of levels if clinically stable, and no need switch to sirolimus. (keep tac low as gvhd is quiet and viremia). 5/10 level 45 with starting of COVID therapy and D-D intractions (Paxlovid for COVID19, which has a drug interaction with tacrolimus-Ritonavir increases serum levels of tacrolimus).   Tacrolimus level subtherapeutic, increase to 2.5 mg twice daily recently, 8/23/2022 instructed to change tacrolimus to 2 mg twice daily when he starts posaconazole and will repeat levels on Monday knows to hold dose. 9/6 with mild NEGRA, hyperkalemia, hypomagnesemia, and reports some tremors and cramps, suspect tacrolimus to be high therefore empirically decreased to 1.5 twice daily, skip morning dose of tacrolimus and took 2 mg today after his afternoon labs.    3/1-3/16: prednisone 100mg every day  3/17 75mg every day   3/31 75 alt with 50  4/6: 75 alt with 25mg every other day  4/12 pred tapered to 60 alternating with 25mg   4/15 In setting of viruses trying to reactivate,GVHD stable: Taper 60/15mg alternating.  4/20 decrease pred further to 50/10.    4/25 taper pred to 50/0 every other day as long as symptoms stable.   5/2 Pred decrease 40/0 alternating every other day.   5/13 decrease to 30/0  5/20 decrease to 20/0 then slowly by 5 mg weekly  6/7 decrease to 10/0  Went up to 15/0 with mild increased LFTs  8/23/2022 increased to 20/0, with persistence of oral ulcers and active ocular GVHD.  Discussed with the patient the importance of stopping chewing of nicotine gums for prolonged hours as that would not help with the healing of the oral ulcers.  I discussed with the patient alternatives of nicotine patches  that he will reconsider and let me know.  9/6 decreased to 15 alternating with 0    5.  ID: Afebrile   #COVID   Positive via homed test 5/8. Treated with Paxlovid with resolution of symptoms.  Had recurrence on 5/18.  Now asymptomatic.  Covid 11/21, 12/21, due for booster    #Pulmonary infiltrates:   4/20 CT chest with new RUL infiltrate. Highly immunocompromised. 4/22 Fungitell/asp GM were neg. BAL 4/29 NGTD, increased micafungin to 150mg IV daily-- f/u 6/1 Dr Garcia CT with mixed results, followed with Dr. Garcia August 2022 with resolution of pulmonary nodules and normalization of LFTs we will switch patient will switch patient to posaconazole prophylactic dose and monitor LFTs and any infectious concerns closely.    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 4/15 started valcyte 900mg PO BID. 4/20 CMV <137, 5/10 ND, decreased to 450mg BID 4/30 - continue 4 weeks as long as CMV <137 till 5/28 + weekly CMV  - Switch to acyclovir after completion of current therapy 5/28. 8/18 CMV ND. Check monthly on IST    - PPx: ACV, micafungin 150mg daily. Pentamidine given 5/13/2022 did not tolerate atovaqone, overdue we will schedule on 8/29. Azithro 250mg daily (stopped levaquin due to possible tendonitis).   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 S/p 4/22 and 5/4/22 rituximab 375mg/m2 5/10 ND x2, 6/7 ND, 8/18/2022 971, check every other month on IST  S/p covid vaccine series 12/2021  S/p evusheld  Deferred annual vaccines in the setting of GVHD and immunosuppression therapy, discussed with patient    6. Endo: Hx of graves disease; On synthroid 175 mcg daily. TSH normal 4/6/2022 no reflex to T4.     7. CV: Norvasc: 2.5mg, increase to 5mg 9/6    8. GI:   -Omeprazole for heartburn  -LFTs as above, now normal, 9/6 decrease ursodiol to daily, monitor, posaconazole level from August 2022 within normal.  Will defer starting statins for hyperlipidemia given recent transaminitis and start of posaconazole.  Discussed  with the patient.    9. Psych:   - Situational anxiety - lexapro 10mg daily.   - Insomnia: worse on steroids. Ativan, trazadone, melatonin    10. MSK: left food drop, PT. Occasional muscle cramps discussed optimizing vitamin D levels and considering vitamin D, some of the symptomatology of muscle cramps are likely related to chronic GVHD.    11. HCM/Age appropriate cancer screening:  -Discussed with the patient importance of age-appropriate cancer screening including colonoscopies, he is due for 1.  -Discussed importance of at least once a year vitamin D level check, 8/2020 236 low normal will start calcium and vitamin D replacement and recheck levels in 2 to 3 months  -Screening for secondary iron overload, 8/2022 ferritin 1023 we will recheck in 2-3 months  -Needs yearly thyroid check and lipid profile check, 8/2022 cholesterol 290 elevated, triglycerides 146 and LDL elevated at 162 discussed with the patient he was on statins prior to transplantation that needs to be restarted [place] will hold off given start of posaconazole and recent transaminitis and LFT abnormalities related to GVHD see GI section above  -Will obtain DEXA scan as well as PFTs.  -Metabolic other, 8/2022 testosterone within normal    Plan:    - Decrease tacro to 1.5mg BID, pending level from today  - Labs Friday in [place], schedulers notified  - Decrease prednisone to 15/0mg  - Decrease ursodiol daily   - 9/20 follow up with me as DOM with prior labs         I spent 40 minutes in the care of this patient today, which included time necessary for preparation for the visit, obtaining history, ordering medications/tests/procedures as medically indicated, review of pertinent medical literature, counseling of the patient, communication of recommendations to the care team, and documentation time.

## 2022-09-06 NOTE — LETTER
9/6/2022         RE: Nik Nava  [address] [facility] [id number]        Dear Colleague,    Thank you for referring your patient, Nik Nava, to the [facility] BLOOD AND MARROW TRANSPLANT PROGRAM [place]. Please see a copy of my visit note below.      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    HPI:   Nik returns for follow up, accompanied by his wife Lamar. Reports mid paraspinal pain more on the right, he said he has had back pains for years that he sees chiropractor for, denies any bony tenderness denies any radiation singling numbness or weakness.  Reports improvement in ocular symptoms, after ophthalmology recommended scleral lens of the right eye, using 3-4 times eye drops, has mouth sore on left side of the cheek improving, cut down on his chewing of nicotine gums since we last talked, no other new ulcers or concerns, good oral intake, No rashes or dry skin, abdominal pain, nausea/vomiting, good appetite, or diarrhea. No bleeding, no  concerns.      Review of Systems                                                                                                                                         10 point Review of systems was negative     PHYSICAL EXAM                                                                                                                                                 KPS: 80  BP (!) 150/88 (BP Location: Right arm, Patient Position: Sitting, Cuff Size: Adult Large)   Pulse 77   Temp 97.9  F (36.6  C) (Oral)   Resp 16   Wt 112.4 kg (247 lb 12.8 oz)   SpO2 97%   BMI 32.69 kg/m      Wt Readings from Last 4 Encounters:   09/06/22 112.4 kg (247 lb 12.8 oz)   08/18/22 112.5 kg (248 lb)   08/18/22 112.3 kg (247 lb 8 oz)   08/18/22 112.9 kg (248 lb 12.8 oz)      General: NAD.  HEENT: sclera anicteric. Oropharynx with very mild lichenoid changes on  bilateral inner cheeks, and left buccal ulcer-smaller and overall healing.  No ulcerations.  Lungs:  CTA b/l  no wheezes  CV: RRR  no gallop  Abd; soft no tenderness; BS nl   Ext/ Skin: Hyperpigmentation noted on torso, back and anterior pelvis.  LE Trace edema, no skin thickening.    cGVHD therapy started on February 24 2022  - Baseline NIH scoring 2/24/2022 : skin 2 maculopapular rash/erythema with no sclerotic features, mouth score 1 mild symptoms with lichenoid changes less than 25%, eyes score 2 moderate symptoms without new visual impairments due to KCS requiring lubricant eyedrops more than 3 times a day, overall mild scale for her provider  - 3/25/2022 1 month NIH treatment assessment on PQRST: skin 2, mouth score 1 mild symptoms with NO lichenoid changes, eyes score 2 moderate symptoms w requiring lubricant eyedrops more than 3 times a day, liver score 1 ALT 3.7x ULN and nl bilirubin   - 3/31  Mouth clear; eyes minimal LFT still abn; no joint or new GI sx  - 4/28 Mouth clear, Left>R eye is mild sclera erythema, lids are pink and irritated appearing, LFT's slow improvement, no new joint, skin, or GI symptoms.   -5/11 mouth with mild erythema but no lichenoid changes, eyes using eyedrops 4-6 times a day score of 2, LFTs significantly improved from baseline slight elevation in alk phos and AST with normal bilirubin, skin with hyperpigmentation from old acute GVHD no active skin rashes, no GI symptoms no musculoskeletal complaints.  -5/26 Mouth with very slight lichenoid changes, erythema.  Eyes still using eyedrops 4-6x per day.  LFTs essentially normal with slight elevation in alk phos.  Skin with hyperpigmentation from old acute GVHD, no active rashes, no GI or MSK concerns.  Skin 0, mouth 0 but with lichenoid changes, eyes 2  -6/7/22 Mouth with no lichenoid changes, erythema.  Eyes still using eyedrops 4-6x per day.  LFTs essentially normal with slight elevation in alk phos.  Skin with hyperpigmentation  from old acute GVHD, no active rashes, no GI or MSK concerns.  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 S/p allo sib PBSCT. (ABO matched)  HCT-CI score: 3 (prior solid tumor)  Day +100 bone marrow biopsy is 100% donor with no morphologic or flow cytometric evidence of leukemia BCR abl is pending.  - Day +180 restaging BM bx- still in CR  100% donor;  2/16/22 BCR ABL major breakpoint undetectable.   - 1 year restaging 8/18/2022: Markedly hypocellular bone marrow [less than 10% cellular] with maturing trilineage hematopoiesis and no definite morphologic or immunophenotypic evidence for involvement by B lymphoblastic leukemia. BCRABL1 PCR not detected, bone marrow % donor. FISH NORMAL No evidence of BCR-ABL1 fusion  - Discussed with the patient maintenance with TKI posttransplantation given his Ph positive ALL that have not been started previously presumed secondary to multiple active issues specifically infections and COVID.  We will reconsider this patient will think about whether this is something he would like to consider he was previously on Dasatinib, we would start on a low dose and increase as tolerated.    2.  HEME:  - Transfuse for hgb <7g/dL; plt < 10k.  No transfusions needs  - Pulmonary emboli: He developed a pulmonary emboli in the setting of receiving PEG asparaginase (ie provoked thrombus).  Xarelto complete.   - Counts are tolerating valcyte well.     3.  FEN/Renal:  - Cr improved.   - Magnesium WNL today, was hypomag likely due to tac.  Continue PO magnesium supplementation for now  - He has a history of renal cancer and resection. Has a single kidney.    4.    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   #Skin GVHD- history of biopsy proven GVHD of the skin 8/30/2021; resolved.   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 Mild flares with transaminitis  # Ocular GVHD: follows with ophthalmology. Maxitrol to lids, continue refreshing drops QID. Score 1-2  # Oral GVHD: dex s/s three times a day; tac ointment to lips as needed.   - Cont tac to 1mg BID. previously decided to stay on same dose, no checks of levels if clinically stable, and no need switch to sirolimus. (keep tac low as gvhd is quiet and viremia). 5/10 level 45 with starting of COVID therapy and D-D intractions (Paxlovid for COVID19, which has a drug interaction with tacrolimus-Ritonavir increases serum levels of tacrolimus).   Tacrolimus level subtherapeutic, increase to 2.5 mg twice daily recently, 8/23/2022 instructed to change tacrolimus to 2 mg twice daily when he starts posaconazole and will repeat levels on Monday knows to hold dose. 9/6 with mild NEGRA, hyperkalemia, hypomagnesemia, and reports some tremors and cramps, suspect tacrolimus to be high therefore empirically decreased to 1.5 twice daily, skip morning dose of tacrolimus and took 2 mg today after his afternoon labs.    3/1-3/16: prednisone 100mg every day  3/17 75mg every day   3/31 75 alt with 50  4/6: 75 alt with 25mg every other day  4/12 pred tapered to 60 alternating with 25mg   4/15 In setting of viruses trying to reactivate,GVHD stable: Taper 60/15mg alternating.  4/20 decrease pred further to 50/10.    4/25 taper pred to 50/0 every other day as long as symptoms stable.   5/2 Pred decrease 40/0 alternating every other day.   5/13 decrease to 30/0  5/20 decrease to 20/0 then slowly by 5 mg weekly  6/7 decrease to 10/0  Went up to 15/0 with mild increased LFTs  8/23/2022 increased to 20/0, with persistence of oral ulcers and active ocular GVHD.  Discussed with the patient the importance of stopping chewing of  nicotine gums for prolonged hours as that would not help with the healing of the oral ulcers.  I discussed with the patient alternatives of nicotine patches that he will reconsider and let me know.  9/6 decreased to 15 alternating with 0    5.  ID: Afebrile   #COVID   Positive via homed test 5/8. Treated with Paxlovid with resolution of symptoms.  Had recurrence on 5/18.  Now asymptomatic.  Covid 11/21, 12/21, due for booster    #Pulmonary infiltrates:   4/20 CT chest with new RUL infiltrate. Highly immunocompromised. 4/22 Fungitell/asp GM were neg. BAL 4/29 NGTD, increased micafungin to 150mg IV daily-- f/u 6/1 Dr Garcia CT with mixed results, followed with Dr. Garcia August 2022 with resolution of pulmonary nodules and normalization of LFTs we will switch patient will switch patient to posaconazole prophylactic dose and monitor LFTs and any infectious concerns closely.    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 4/15 started valcyte 900mg PO BID. 4/20 CMV <137, 5/10 ND, decreased to 450mg BID 4/30 - continue 4 weeks as long as CMV <137 till 5/28 + weekly CMV  - Switch to acyclovir after completion of current therapy 5/28. 8/18 CMV ND. Check monthly on IST    - PPx: ACV, micafungin 150mg daily. Pentamidine given 5/13/2022 did not tolerate atovaqone, overdue we will schedule on 8/29. Azithro 250mg daily (stopped levaquin due to possible tendonitis).   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 S/p 4/22 and 5/4/22 rituximab 375mg/m2 5/10 ND x2, 6/7 ND, 8/18/2022 971, check every other month on IST  S/p covid vaccine series 12/2021  S/p evusheld  Deferred annual vaccines in the setting of GVHD and immunosuppression therapy, discussed with patient    6. Endo: Hx of graves disease; On synthroid 175 mcg daily. TSH normal 4/6/2022 no reflex to T4.     7. CV: Norvasc: 2.5mg, increase to 5mg 9/6    8. GI:   -Omeprazole for heartburn  -LFTs as above, now normal, 9/6 decrease ursodiol to daily, monitor,  posaconazole level from August 2022 within normal.  Will defer starting statins for hyperlipidemia given recent transaminitis and start of posaconazole.  Discussed with the patient.    9. Psych:   - Situational anxiety - lexapro 10mg daily.   - Insomnia: worse on steroids. Ativan, trazadone, melatonin    10. MSK: left food drop, PT. Occasional muscle cramps discussed optimizing vitamin D levels and considering vitamin D, some of the symptomatology of muscle cramps are likely related to chronic GVHD.    11. HCM/Age appropriate cancer screening:  -Discussed with the patient importance of age-appropriate cancer screening including colonoscopies, he is due for 1.  -Discussed importance of at least once a year vitamin D level check, 8/2020 236 low normal will start calcium and vitamin D replacement and recheck levels in 2 to 3 months  -Screening for secondary iron overload, 8/2022 ferritin 1023 we will recheck in 2-3 months  -Needs yearly thyroid check and lipid profile check, 8/2022 cholesterol 290 elevated, triglycerides 146 and LDL elevated at 162 discussed with the patient he was on statins prior to transplantation that needs to be restarted [place] will hold off given start of posaconazole and recent transaminitis and LFT abnormalities related to GVHD see GI section above  -Will obtain DEXA scan as well as PFTs.  -Metabolic other, 8/2022 testosterone within normal    Plan:    - Decrease tacro to 1.5mg BID, pending level from today  - Labs Friday in [place], schedulers notified  - Decrease prednisone to 15/0mg  - Decrease ursodiol daily   - 9/20 follow up with me as DOM with prior labs      I spent 40 minutes in the care of this patient today, which included time necessary for preparation for the visit, obtaining history, ordering medications/tests/procedures as medically indicated, review of pertinent medical literature, counseling of the patient, communication of recommendations to the care team, and  documentation time.        Again, thank you for allowing me to participate in the care of your patient.      Sincerely,    Akshat Tobar MD

## 2022-09-06 NOTE — NURSING NOTE
"Chief Complaint   Patient presents with     Port Draw     Labs drawn from port by rn.  VS taken.     Port accessed with 20 gauge 3/4\" gripper needle and labs (including researcdh labs) by rn.  Port flushed with NS and heparin then de-accessed.  Pt tolerated well.  VS taken.  Pt checked in for next appt.    Argenis Watkins RN      "

## 2022-09-07 LAB — CMV DNA SPEC NAA+PROBE-ACNC: NOT DETECTED IU/ML

## 2022-09-09 ENCOUNTER — LAB (OUTPATIENT)
Dept: LAB | Facility: CLINIC | Age: 62
End: 2022-09-09
Payer: COMMERCIAL

## 2022-09-09 DIAGNOSIS — D89.813 GVHD AS COMPLICATION OF BONE MARROW TRANSPLANT (H): ICD-10-CM

## 2022-09-09 DIAGNOSIS — Z94.81 STATUS POST BONE MARROW TRANSPLANT (H): Primary | ICD-10-CM

## 2022-09-09 DIAGNOSIS — T86.09 GVHD AS COMPLICATION OF BONE MARROW TRANSPLANT (H): ICD-10-CM

## 2022-09-09 LAB
ALBUMIN SERPL-MCNC: 3.7 G/DL (ref 3.4–5)
ALP SERPL-CCNC: 111 U/L (ref 40–150)
ALT SERPL W P-5'-P-CCNC: 35 U/L (ref 0–70)
ANION GAP SERPL CALCULATED.3IONS-SCNC: 6 MMOL/L (ref 3–14)
AST SERPL W P-5'-P-CCNC: 33 U/L (ref 0–45)
BASOPHILS # BLD AUTO: 0 10E3/UL (ref 0–0.2)
BASOPHILS NFR BLD AUTO: 0 %
BILIRUB SERPL-MCNC: 0.8 MG/DL (ref 0.2–1.3)
BUN SERPL-MCNC: 64 MG/DL (ref 7–30)
CALCIUM SERPL-MCNC: 10.1 MG/DL (ref 8.5–10.1)
CHLORIDE BLD-SCNC: 107 MMOL/L (ref 94–109)
CO2 SERPL-SCNC: 26 MMOL/L (ref 20–32)
CREAT SERPL-MCNC: 1.84 MG/DL (ref 0.66–1.25)
EOSINOPHIL # BLD AUTO: 0 10E3/UL (ref 0–0.7)
EOSINOPHIL NFR BLD AUTO: 0 %
ERYTHROCYTE [DISTWIDTH] IN BLOOD BY AUTOMATED COUNT: 14.8 % (ref 10–15)
GFR SERPL CREATININE-BSD FRML MDRD: 40 ML/MIN/1.73M2
GLUCOSE BLD-MCNC: 100 MG/DL (ref 70–99)
HCT VFR BLD AUTO: 39.3 % (ref 40–53)
HGB BLD-MCNC: 13.5 G/DL (ref 13.3–17.7)
IMM GRANULOCYTES # BLD: 0.1 10E3/UL
IMM GRANULOCYTES NFR BLD: 1 %
LYMPHOCYTES # BLD AUTO: 2.6 10E3/UL (ref 0.8–5.3)
LYMPHOCYTES NFR BLD AUTO: 26 %
MAGNESIUM SERPL-MCNC: 1.9 MG/DL (ref 1.6–2.3)
MCH RBC QN AUTO: 35.3 PG (ref 26.5–33)
MCHC RBC AUTO-ENTMCNC: 34.4 G/DL (ref 31.5–36.5)
MCV RBC AUTO: 103 FL (ref 78–100)
MONOCYTES # BLD AUTO: 1.2 10E3/UL (ref 0–1.3)
MONOCYTES NFR BLD AUTO: 11 %
NEUTROPHILS # BLD AUTO: 6.1 10E3/UL (ref 1.6–8.3)
NEUTROPHILS NFR BLD AUTO: 62 %
NRBC # BLD AUTO: 0 10E3/UL
NRBC BLD AUTO-RTO: 0 /100
PLATELET # BLD AUTO: 151 10E3/UL (ref 150–450)
POTASSIUM BLD-SCNC: 4.7 MMOL/L (ref 3.4–5.3)
PROT SERPL-MCNC: 6.9 G/DL (ref 6.8–8.8)
RBC # BLD AUTO: 3.82 10E6/UL (ref 4.4–5.9)
SODIUM SERPL-SCNC: 139 MMOL/L (ref 133–144)
TACROLIMUS BLD-MCNC: 9.9 UG/L (ref 5–15)
TME LAST DOSE: NORMAL H
TME LAST DOSE: NORMAL H
WBC # BLD AUTO: 10.1 10E3/UL (ref 4–11)

## 2022-09-09 PROCEDURE — 80197 ASSAY OF TACROLIMUS: CPT

## 2022-09-09 PROCEDURE — 80053 COMPREHEN METABOLIC PANEL: CPT

## 2022-09-09 PROCEDURE — 83735 ASSAY OF MAGNESIUM: CPT

## 2022-09-09 PROCEDURE — 36415 COLL VENOUS BLD VENIPUNCTURE: CPT

## 2022-09-09 PROCEDURE — 85025 COMPLETE CBC W/AUTO DIFF WBC: CPT

## 2022-09-10 ENCOUNTER — INFUSION THERAPY VISIT (OUTPATIENT)
Dept: TRANSPLANT | Facility: CLINIC | Age: 62
End: 2022-09-10
Attending: INTERNAL MEDICINE
Payer: COMMERCIAL

## 2022-09-10 VITALS
DIASTOLIC BLOOD PRESSURE: 79 MMHG | HEART RATE: 85 BPM | SYSTOLIC BLOOD PRESSURE: 120 MMHG | WEIGHT: 245 LBS | TEMPERATURE: 97.9 F | OXYGEN SATURATION: 98 % | RESPIRATION RATE: 18 BRPM | BODY MASS INDEX: 32.32 KG/M2

## 2022-09-10 DIAGNOSIS — D89.813 GVHD AS COMPLICATION OF BONE MARROW TRANSPLANT (H): Primary | ICD-10-CM

## 2022-09-10 DIAGNOSIS — Z94.81 STATUS POST BONE MARROW TRANSPLANT (H): ICD-10-CM

## 2022-09-10 DIAGNOSIS — C91.01 ACUTE LYMPHOBLASTIC LEUKEMIA (ALL) IN REMISSION (H): ICD-10-CM

## 2022-09-10 DIAGNOSIS — T86.09 GVHD AS COMPLICATION OF BONE MARROW TRANSPLANT (H): Primary | ICD-10-CM

## 2022-09-10 LAB
ALBUMIN SERPL BCG-MCNC: 3.9 G/DL (ref 3.5–5.2)
ALP SERPL-CCNC: 104 U/L (ref 40–129)
ALT SERPL W P-5'-P-CCNC: 30 U/L (ref 10–50)
ANION GAP SERPL CALCULATED.3IONS-SCNC: 11 MMOL/L (ref 7–15)
AST SERPL W P-5'-P-CCNC: 36 U/L (ref 10–50)
BILIRUB SERPL-MCNC: 0.7 MG/DL
BUN SERPL-MCNC: 56.4 MG/DL (ref 8–23)
CALCIUM SERPL-MCNC: 10.1 MG/DL (ref 8.8–10.2)
CHLORIDE SERPL-SCNC: 103 MMOL/L (ref 98–107)
CREAT SERPL-MCNC: 1.4 MG/DL (ref 0.67–1.17)
DEPRECATED HCO3 PLAS-SCNC: 22 MMOL/L (ref 22–29)
GFR SERPL CREATININE-BSD FRML MDRD: 56 ML/MIN/1.73M2
GLUCOSE SERPL-MCNC: 107 MG/DL (ref 70–99)
POTASSIUM SERPL-SCNC: 5.3 MMOL/L (ref 3.4–5.3)
PROT SERPL-MCNC: 6.5 G/DL (ref 6.4–8.3)
SODIUM SERPL-SCNC: 136 MMOL/L (ref 136–145)

## 2022-09-10 PROCEDURE — 80053 COMPREHEN METABOLIC PANEL: CPT

## 2022-09-10 PROCEDURE — 250N000011 HC RX IP 250 OP 636: Performed by: INTERNAL MEDICINE

## 2022-09-10 PROCEDURE — 96360 HYDRATION IV INFUSION INIT: CPT

## 2022-09-10 PROCEDURE — 258N000003 HC RX IP 258 OP 636: Performed by: INTERNAL MEDICINE

## 2022-09-10 PROCEDURE — 36591 DRAW BLOOD OFF VENOUS DEVICE: CPT

## 2022-09-10 RX ORDER — HEPARIN SODIUM (PORCINE) LOCK FLUSH IV SOLN 100 UNIT/ML 100 UNIT/ML
5 SOLUTION INTRAVENOUS EVERY 8 HOURS
Status: DISCONTINUED | OUTPATIENT
Start: 2022-09-10 | End: 2022-09-10 | Stop reason: HOSPADM

## 2022-09-10 RX ORDER — TACROLIMUS 0.5 MG/1
CAPSULE ORAL
Qty: 60 CAPSULE | Refills: 1 | COMMUNITY
Start: 2022-09-10 | End: 2022-09-21

## 2022-09-10 RX ORDER — TACROLIMUS 1 MG/1
CAPSULE ORAL
Qty: 120 CAPSULE | Refills: 1 | COMMUNITY
Start: 2022-09-10 | End: 2022-09-21

## 2022-09-10 RX ADMIN — Medication 5 ML: at 08:35

## 2022-09-10 RX ADMIN — SODIUM CHLORIDE 1000 ML: 9 INJECTION, SOLUTION INTRAVENOUS at 07:33

## 2022-09-10 ASSESSMENT — PAIN SCALES - GENERAL: PAINLEVEL: NO PAIN (0)

## 2022-09-10 NOTE — PROGRESS NOTES
Infusion Nursing Note:  Nik MCDONALD [facility] presents today for IV hydration.    Patient seen by provider today: No   present during visit today: Not Applicable.    Note: pt received 1 liter NS.    Intravenous Access:  Implanted Port.    Treatment Conditions:  Not Applicable.    Post Infusion Assessment:  Patient tolerated infusion without incident.     Discharge Plan:   Patient and/or family verbalized understanding of discharge instructions and all questions answered.      Stephanie Seals RN

## 2022-09-10 NOTE — LETTER
Date:September 10, 2022      Provider requested that no letter be sent. Do not send.       [facility]

## 2022-09-10 NOTE — LETTER
9/10/2022         RE: Nik Nava  [address] [facility] [place] [address]        Dear Colleague,    Thank you for referring your patient, Nik Nvaa, to the [facility] BLOOD AND MARROW TRANSPLANT PROGRAM [place]. Please see a copy of my visit note below.    Infusion Nursing Note:  Nik Nava presents today for IV hydration.    Patient seen by provider today: No   present during visit today: Not Applicable.    Note: pt received 1 liter NS.    Intravenous Access:  Implanted Port.    Treatment Conditions:  Not Applicable.    Post Infusion Assessment:  Patient tolerated infusion without incident.     Discharge Plan:   Patient and/or family verbalized understanding of discharge instructions and all questions answered.      Stephanie Seals, ENRIQUE                          Again, thank you for allowing me to participate in the care of your patient.        Sincerely,        [facility]

## 2022-09-12 ENCOUNTER — TELEPHONE (OUTPATIENT)
Dept: ONCOLOGY | Facility: CLINIC | Age: 62
End: 2022-09-12

## 2022-09-12 NOTE — TELEPHONE ENCOUNTER
Renetta from [employer] is looking for prior authorization for Posaconazole.     PA team can call or fax back information:   Phone [phone]  Fax; [phone]

## 2022-09-12 NOTE — TELEPHONE ENCOUNTER
Central Prior Authorization Team   Phone: [phone]      PA Initiation    Medication: posaconazole (NOXAFIL) 100 MG EC tablet-PA INITIATED  Insurance Company: [employer] - Phone [phone] Fax [phone]  Pharmacy Filling the Rx: [facility] - [place], [place] - [address] [facility]  Filling Pharmacy Phone: [phone]  Filling Pharmacy Fax:    Start Date: 9/12/2022

## 2022-09-13 ENCOUNTER — APPOINTMENT (OUTPATIENT)
Dept: LAB | Facility: CLINIC | Age: 62
End: 2022-09-13
Attending: INTERNAL MEDICINE
Payer: COMMERCIAL

## 2022-09-13 ENCOUNTER — ONCOLOGY VISIT (OUTPATIENT)
Dept: TRANSPLANT | Facility: CLINIC | Age: 62
End: 2022-09-13
Attending: INTERNAL MEDICINE
Payer: COMMERCIAL

## 2022-09-13 VITALS
SYSTOLIC BLOOD PRESSURE: 124 MMHG | BODY MASS INDEX: 32.43 KG/M2 | RESPIRATION RATE: 18 BRPM | OXYGEN SATURATION: 98 % | DIASTOLIC BLOOD PRESSURE: 79 MMHG | TEMPERATURE: 97.5 F | WEIGHT: 245.8 LBS | HEART RATE: 89 BPM

## 2022-09-13 DIAGNOSIS — T86.09 GVHD AS COMPLICATION OF BONE MARROW TRANSPLANT (H): Primary | ICD-10-CM

## 2022-09-13 DIAGNOSIS — D89.813 GVHD AS COMPLICATION OF BONE MARROW TRANSPLANT (H): Primary | ICD-10-CM

## 2022-09-13 DIAGNOSIS — Z94.81 STATUS POST BONE MARROW TRANSPLANT (H): ICD-10-CM

## 2022-09-13 LAB
ALBUMIN SERPL BCG-MCNC: 4 G/DL (ref 3.5–5.2)
ALP SERPL-CCNC: 111 U/L (ref 40–129)
ALT SERPL W P-5'-P-CCNC: 30 U/L (ref 10–50)
ANION GAP SERPL CALCULATED.3IONS-SCNC: 13 MMOL/L (ref 7–15)
AST SERPL W P-5'-P-CCNC: 37 U/L (ref 10–50)
BASOPHILS # BLD AUTO: 0 10E3/UL (ref 0–0.2)
BASOPHILS NFR BLD AUTO: 0 %
BILIRUB SERPL-MCNC: 0.7 MG/DL
BUN SERPL-MCNC: 50.4 MG/DL (ref 8–23)
CALCIUM SERPL-MCNC: 10 MG/DL (ref 8.8–10.2)
CHLORIDE SERPL-SCNC: 102 MMOL/L (ref 98–107)
CREAT SERPL-MCNC: 1.38 MG/DL (ref 0.67–1.17)
DEPRECATED HCO3 PLAS-SCNC: 22 MMOL/L (ref 22–29)
EOSINOPHIL # BLD AUTO: 0.1 10E3/UL (ref 0–0.7)
EOSINOPHIL NFR BLD AUTO: 1 %
ERYTHROCYTE [DISTWIDTH] IN BLOOD BY AUTOMATED COUNT: 15.2 % (ref 10–15)
GFR SERPL CREATININE-BSD FRML MDRD: 57 ML/MIN/1.73M2
GLUCOSE SERPL-MCNC: 150 MG/DL (ref 70–99)
HCT VFR BLD AUTO: 38.3 % (ref 40–53)
HGB BLD-MCNC: 13.3 G/DL (ref 13.3–17.7)
IMM GRANULOCYTES # BLD: 0.1 10E3/UL
IMM GRANULOCYTES NFR BLD: 1 %
LYMPHOCYTES # BLD AUTO: 2.1 10E3/UL (ref 0.8–5.3)
LYMPHOCYTES NFR BLD AUTO: 23 %
MAGNESIUM SERPL-MCNC: 1.5 MG/DL (ref 1.7–2.3)
MCH RBC QN AUTO: 34.6 PG (ref 26.5–33)
MCHC RBC AUTO-ENTMCNC: 34.7 G/DL (ref 31.5–36.5)
MCV RBC AUTO: 100 FL (ref 78–100)
MONOCYTES # BLD AUTO: 1.1 10E3/UL (ref 0–1.3)
MONOCYTES NFR BLD AUTO: 12 %
NEUTROPHILS # BLD AUTO: 5.8 10E3/UL (ref 1.6–8.3)
NEUTROPHILS NFR BLD AUTO: 63 %
NRBC # BLD AUTO: 0 10E3/UL
NRBC BLD AUTO-RTO: 0 /100
PLATELET # BLD AUTO: 160 10E3/UL (ref 150–450)
POTASSIUM SERPL-SCNC: 4.6 MMOL/L (ref 3.4–5.3)
PROT SERPL-MCNC: 6.6 G/DL (ref 6.4–8.3)
RBC # BLD AUTO: 3.84 10E6/UL (ref 4.4–5.9)
SODIUM SERPL-SCNC: 137 MMOL/L (ref 136–145)
TACROLIMUS BLD-MCNC: 6.6 UG/L (ref 5–15)
TME LAST DOSE: NORMAL H
TME LAST DOSE: NORMAL H
WBC # BLD AUTO: 9.2 10E3/UL (ref 4–11)

## 2022-09-13 PROCEDURE — 258N000003 HC RX IP 258 OP 636: Performed by: INTERNAL MEDICINE

## 2022-09-13 PROCEDURE — 85025 COMPLETE CBC W/AUTO DIFF WBC: CPT

## 2022-09-13 PROCEDURE — 36591 DRAW BLOOD OFF VENOUS DEVICE: CPT

## 2022-09-13 PROCEDURE — 250N000011 HC RX IP 250 OP 636: Performed by: INTERNAL MEDICINE

## 2022-09-13 PROCEDURE — 80197 ASSAY OF TACROLIMUS: CPT

## 2022-09-13 PROCEDURE — 83735 ASSAY OF MAGNESIUM: CPT

## 2022-09-13 PROCEDURE — G0463 HOSPITAL OUTPT CLINIC VISIT: HCPCS

## 2022-09-13 PROCEDURE — 96360 HYDRATION IV INFUSION INIT: CPT

## 2022-09-13 PROCEDURE — 80053 COMPREHEN METABOLIC PANEL: CPT

## 2022-09-13 PROCEDURE — 99214 OFFICE O/P EST MOD 30 MIN: CPT | Performed by: INTERNAL MEDICINE

## 2022-09-13 RX ORDER — HEPARIN SODIUM (PORCINE) LOCK FLUSH IV SOLN 100 UNIT/ML 100 UNIT/ML
5 SOLUTION INTRAVENOUS ONCE
Status: COMPLETED | OUTPATIENT
Start: 2022-09-13 | End: 2022-09-13

## 2022-09-13 RX ADMIN — SODIUM CHLORIDE 1000 ML: 9 INJECTION, SOLUTION INTRAVENOUS at 15:35

## 2022-09-13 RX ADMIN — SODIUM CHLORIDE, PRESERVATIVE FREE 5 ML: 5 INJECTION INTRAVENOUS at 16:35

## 2022-09-13 RX ADMIN — SODIUM CHLORIDE, PRESERVATIVE FREE 5 ML: 5 INJECTION INTRAVENOUS at 14:20

## 2022-09-13 ASSESSMENT — PAIN SCALES - GENERAL: PAINLEVEL: NO PAIN (0)

## 2022-09-13 NOTE — PROGRESS NOTES
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    HPI:   Nik returns for follow up, Lamar is on the phone. Reports improvement in mid paraspinal pain, denies any bony tenderness denies any radiation numbness or weakness. Reports stable/improvement in ocular symptoms, after ophthalmology recommended scleral lens of the right eye, using 3-4 times eye drops, has mouth sore on left side of the cheek improving, cut down on his chewing of nicotine gums since we last talked, no other new ulcers or concerns, good oral intake, No rashes or dry skin, abdominal pain, nausea/vomiting, good appetite, or diarrhea. No bleeding, no  concerns.      Review of Systems                                                                                                                                         10 point Review of systems was negative     PHYSICAL EXAM                                                                                                                                                 KPS: 80  /79   Pulse 89   Temp 97.5  F (36.4  C) (Oral)   Resp 18   Wt 111.5 kg (245 lb 12.8 oz)   SpO2 98%   BMI 32.43 kg/m      Wt Readings from Last 4 Encounters:   09/10/22 111.1 kg (245 lb)   09/06/22 112.4 kg (247 lb 12.8 oz)   08/18/22 112.5 kg (248 lb)   08/18/22 112.3 kg (247 lb 8 oz)      General: NAD.  HEENT: sclera anicteric. Oropharynx with very mild lichenoid changes on bilateral inner cheeks, and left buccal ulcer-smaller and overall healing.  No ulcerations.  Lungs:  CTA b/l  no wheezes  CV: RRR  no gallop  Abd; soft no tenderness; BS nl   Ext/ Skin: Hyperpigmentation noted on torso, back and anterior pelvis.  LE Trace edema, no skin thickening.    cGVHD therapy started on February 24 2022  - Baseline NIH scoring 2/24/2022 : skin 2 maculopapular rash/erythema with no sclerotic features, mouth score 1 mild symptoms  with lichenoid changes less than 25%, eyes score 2 moderate symptoms without new visual impairments due to KCS requiring lubricant eyedrops more than 3 times a day, overall mild scale for her provider  - 3/25/2022 1 month [facility] treatment assessment on PQRST: skin 2, mouth score 1 mild symptoms with NO lichenoid changes, eyes score 2 moderate symptoms w requiring lubricant eyedrops more than 3 times a day, liver score 1 ALT 3.7x ULN and nl bilirubin   - 3/31  Mouth clear; eyes minimal LFT still abn; no joint or new GI sx  - 4/28 Mouth clear, Left>R eye is mild sclera erythema, lids are pink and irritated appearing, LFT's slow improvement, no new joint, skin, or GI symptoms.   -5/11 mouth with mild erythema but no lichenoid changes, eyes using eyedrops 4-6 times a day score of 2, LFTs significantly improved from baseline slight elevation in alk phos and AST with normal bilirubin, skin with hyperpigmentation from old acute GVHD no active skin rashes, no GI symptoms no musculoskeletal complaints.  -5/26 Mouth with very slight lichenoid changes, erythema.  Eyes still using eyedrops 4-6x per day.  LFTs essentially normal with slight elevation in alk phos.  Skin with hyperpigmentation from old acute GVHD, no active rashes, no GI or MSK concerns.  Skin 0, mouth 0 but with lichenoid changes, eyes 2  -6/7/22 Mouth with no lichenoid changes, erythema.  Eyes still using eyedrops 4-6x per day.  LFTs essentially normal with slight elevation in alk phos.  Skin with hyperpigmentation from old acute GVHD, no active rashes, no GI or MSK concerns.  Skin 0, mouth 0, eyes 2  -6 month PQRST assessment 9/6/2022: eyes 3, mouth 0 for symptoms, 25% lichenoid changes (1), liver/GI/skin 0    ASSESSMENT AND PLAN   Nik Nava is a 61 year old male with Ph Pos ALL, day 399 s/p sib allo stem cell transplant    Day -6 (8/4): flu/cy  Day -5 through day -2 (8/5-8/8): flu  Day -1 (8/9): TBI  Day 0 (8/10): transplant     1.  Acute lymphoblastic  leukemia, [place] chromosome positive in CR, MRD neg. S/p allo sib PBSCT. (ABO matched)  HCT-CI score: 3 (prior solid tumor)  Day +100 bone marrow biopsy is 100% donor with no morphologic or flow cytometric evidence of leukemia BCR abl is pending.  - Day +180 restaging BM bx- still in CR  100% donor;  2/16/22 BCR ABL major breakpoint undetectable.   - 1 year restaging 8/18/2022: Markedly hypocellular bone marrow [less than 10% cellular] with maturing trilineage hematopoiesis and no definite morphologic or immunophenotypic evidence for involvement by B lymphoblastic leukemia. BCRABL1 PCR not detected, bone marrow % donor. FISH NORMAL No evidence of BCR-ABL1 fusion  - Discussed with the patient maintenance with TKI posttransplantation given his Ph positive ALL that have not been started previously presumed secondary to multiple active issues specifically infections and COVID.  We will reconsider this patient will think about whether this is something he would like to consider he was previously on Dasatinib, we would start on a low dose and increase as tolerated.    2.  HEME:  - Transfuse for hgb <7g/dL; plt < 10k.  No transfusions needs  - Pulmonary emboli: He developed a pulmonary emboli in the setting of receiving PEG asparaginase (ie provoked thrombus).  Xarelto complete.   - Counts are tolerating valcyte well.     3.  FEN/Renal:  - Cr improved to 1.4-1.3  - Magnesium 1.5, was hypomag likely due to tac. PO magnesium supplementation for now  - He has a history of renal cancer and resection. Has a single kidney.    4.    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   #Skin GVHD- history of biopsy proven GVHD of the skin 8/30/2021; resolved.   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 Mild flares with transaminitis  # Ocular GVHD: follows with ophthalmology. Maxitrol to lids, continue refreshing drops QID. Score 1-2  # Oral GVHD: dex s/s  three times a day; tac ointment to lips as needed.   - Cont tac to 1mg BID. previously decided to stay on same dose, no checks of levels if clinically stable, and no need switch to sirolimus. (keep tac low as gvhd is quiet and viremia). 5/10 level 45 with starting of COVID therapy and D-D intractions (Paxlovid for COVID19, which has a drug interaction with tacrolimus-Ritonavir increases serum levels of tacrolimus).   Tacrolimus level subtherapeutic, increase to 2.5 mg twice daily recently, 8/23/2022 instructed to change tacrolimus to 2 mg twice daily when he starts posaconazole and will repeat levels on Monday knows to hold dose. 9/6 with mild NEGRA, hyperkalemia, hypomagnesemia, and reports some tremors and cramps, suspect tacrolimus to be high therefore empirically decreased to 1.5 twice daily, skip morning dose of tacrolimus and took 2 mg today after his afternoon labs. Decrease to 1mg BID on Saturday    3/1-3/16: prednisone 100mg every day  3/17 75mg every day   3/31 75 alt with 50  4/6: 75 alt with 25mg every other day  4/12 pred tapered to 60 alternating with 25mg   4/15 In setting of viruses trying to reactivate,GVHD stable: Taper 60/15mg alternating.  4/20 decrease pred further to 50/10.    4/25 taper pred to 50/0 every other day as long as symptoms stable.   5/2 Pred decrease 40/0 alternating every other day.   5/13 decrease to 30/0  5/20 decrease to 20/0 then slowly by 5 mg weekly  6/7 decrease to 10/0  Went up to 15/0 with mild increased LFTs  8/23/2022 increased to 20/0, with persistence of oral ulcers and active ocular GVHD.  Discussed with the patient the importance of stopping chewing of nicotine gums for prolonged hours as that would not help with the healing of the oral ulcers.  I discussed with the patient alternatives of nicotine patches that he will reconsider and let me know.  9/6 decreased to 15 alternating with 0  9/13 decreased to 10 alternating with 0    5.  ID: Afebrile   #COVID   Positive  via homed test 5/8. Treated with Paxlovid with resolution of symptoms.  Had recurrence on 5/18.  Now asymptomatic.  Covid 11/21, 12/21, due for booster will recommend locally but he would like it here     #Pulmonary infiltrates:   4/20 CT chest with new RUL infiltrate. Highly immunocompromised. 4/22 Fungitell/asp GM were neg. BAL 4/29 NGTD, increased micafungin to 150mg IV daily-- f/u 6/1 Dr Garcia CT with mixed results, followed with Dr. Garcia August 2022 with resolution of pulmonary nodules and normalization of LFTs we will switch patient will switch patient to posaconazole prophylactic dose and monitor LFTs and any infectious concerns closely.    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 4/15 started valcyte 900mg PO BID. 4/20 CMV <137, 5/10 ND, decreased to 450mg BID 4/30 - continue 4 weeks as long as CMV <137 till 5/28 + weekly CMV  - Switch to acyclovir after completion of current therapy 5/28. 8/18 CMV ND. Check monthly on IST    - PPx: ACV, micafungin 150mg daily. Pentamidine given 5/13/2022 did not tolerate atovaqone, overdue we will schedule on 8/29. Azithro 250mg daily (stopped levaquin due to possible tendonitis).   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 S/p 4/22 and 5/4/22 rituximab 375mg/m2 5/10 ND x2, 6/7 ND, 8/18/2022 971, check every other month on IST  S/p covid vaccine series 12/2021  S/p evusheld  Deferred annual vaccines in the setting of GVHD and immunosuppression therapy, discussed with patient    6. Endo: Hx of graves disease; On synthroid 175 mcg daily. TSH normal 4/6/2022 no reflex to T4.     7. CV: Norvasc: 2.5mg, increase to 5mg 9/6    8. GI:   -Omeprazole for heartburn  -LFTs as above, now normal, 9/6 decrease ursodiol to daily, monitor, posaconazole level from August 2022 within normal.  Will defer starting statins for hyperlipidemia given recent transaminitis and start of posaconazole.  Discussed with the patient.    9. Psych:   - Situational anxiety - lexapro 10mg  daily.   - Insomnia: worse on steroids. Ativan, trazadone, melatonin    10. MSK: left food drop, PT. Occasional muscle cramps discussed optimizing vitamin D levels and considering vitamin D, some of the symptomatology of muscle cramps are likely related to chronic GVHD.    11. HCM/Age appropriate cancer screening:  -Discussed with the patient importance of age-appropriate cancer screening including colonoscopies, he is due for 1.  -Discussed importance of at least once a year vitamin D level check, 8/2020 236 low normal will start calcium and vitamin D replacement and recheck levels in 2 to 3 months  -Screening for secondary iron overload, 8/2022 ferritin 1023 we will recheck in 2-3 months  -Needs yearly thyroid check and lipid profile check, 8/2022 cholesterol 290 elevated, triglycerides 146 and LDL elevated at 162 discussed with the patient he was on statins prior to transplantation that needs to be restarted [place] will hold off given start of posaconazole and recent transaminitis and LFT abnormalities related to GVHD see GI section above  -9/6/2022 DEXA scan Based on BMD diagnosis is consistent with normal bone density based on WHO criteria Ref. 1   -Needs PFTs. 9/20  -Metabolic other, 8/2022 testosterone within normal    Plan:    - Decreased tacro to 1 mg BID, pending level from today  - Decrease prednisone to 10/0mg  - Discontinue ursodiol   - Magnesium daily  - Thursday after ophthalmo labs and possible fluids  - 9/20 follow up with me as DOM with prior labs         I spent 30 minutes in the care of this patient today, which included time necessary for preparation for the visit, obtaining history, ordering medications/tests/procedures as medically indicated, review of pertinent medical literature, counseling of the patient, communication of recommendations to the care team, and documentation time.

## 2022-09-13 NOTE — NURSING NOTE
"Oncology Rooming Note    September 13, 2022 2:37 PM   Nik Nava is a 62 year old male who presents for:    Chief Complaint   Patient presents with     Blood Draw     Port blood draw with heparin flush by lab RN. Vitals taken and appointment arrived     Initial Vitals: /79   Pulse 89   Temp 97.5  F (36.4  C) (Oral)   Resp 18   Wt 111.5 kg (245 lb 12.8 oz)   SpO2 98%   BMI 32.43 kg/m   Estimated body mass index is 32.43 kg/m  as calculated from the following:    Height as of 8/18/22: 1.854 m (6' 1\").    Weight as of this encounter: 111.5 kg (245 lb 12.8 oz). Body surface area is 2.4 meters squared.  No Pain (0) Comment: Data Unavailable   No LMP for male patient.  Allergies reviewed: Yes  Medications reviewed: Yes    Medications: Medication refills not needed today.  Pharmacy name entered into [facility]:    [facility] [place], [place] - [address] [facility] [place], [place] - [phone]    Clinical concerns: none       Catherine Mahan, NERIQUE            "

## 2022-09-13 NOTE — LETTER
9/13/2022         RE: Nik Nava  [id number]  [place] [address]        Dear Colleague,    Thank you for referring your patient, Nik Nava, to the [facility] [place]. Please see a copy of my visit note below.    Infusion Nursing Note:  Nik Nava presents today for add-on infusion.    Patient seen by provider today: Yes: Dr. Avila Tobar   present during visit today: Not Applicable.    Note: Labs were monitored.    Intravenous Access:  Implanted Port.    Treatment Conditions:  Per Provider order, Patient received an add-on IV fluid infusion for a creatinine of 1.38.    Post Infusion Assessment:  Patient tolerated infusion without incident.     Discharge Plan:   Patient discharged in stable condition accompanied by: self.      CORINE HANSEN RN                          Again, thank you for allowing me to participate in the care of your patient.        Sincerely,        [facility]

## 2022-09-13 NOTE — NURSING NOTE
Chief Complaint   Patient presents with     Blood Draw     Port blood draw with heparin flush by lab RN. Vitals taken and appointment arrived     Natty Ceballos RN

## 2022-09-13 NOTE — PROGRESS NOTES
Infusion Nursing Note:  Nik MCDONALD [place]t presents today for add-on infusion.    Patient seen by provider today: Yes: Dr. Avila Tobar   present during visit today: Not Applicable.    Note: Labs were monitored.    Intravenous Access:  Implanted Port.    Treatment Conditions:  Per Provider order, Patient received an add-on IV fluid infusion for a creatinine of 1.38.    Post Infusion Assessment:  Patient tolerated infusion without incident.     Discharge Plan:   Patient discharged in stable condition accompanied by: self.      CORINE HANSEN RN

## 2022-09-13 NOTE — LETTER
9/13/2022         RE: Nik Nava  [address] [facility] [address]        Dear Colleague,    Thank you for referring your patient, Nik Nava, to the [facility] BLOOD AND MARROW TRANSPLANT PROGRAM [place]. Please see a copy of my visit note below.      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    HPI:   Nik returns for follow up, Lamar is on the phone. Reports improvement in mid paraspinal pain, denies any bony tenderness denies any radiation numbness or weakness. Reports stable/improvement in ocular symptoms, after ophthalmology recommended scleral lens of the right eye, using 3-4 times eye drops, has mouth sore on left side of the cheek improving, cut down on his chewing of nicotine gums since we last talked, no other new ulcers or concerns, good oral intake, No rashes or dry skin, abdominal pain, nausea/vomiting, good appetite, or diarrhea. No bleeding, no  concerns.      Review of Systems                                                                                                                                         10 point Review of systems was negative     PHYSICAL EXAM                                                                                                                                                 KPS: 80  /79   Pulse 89   Temp 97.5  F (36.4  C) (Oral)   Resp 18   Wt 111.5 kg (245 lb 12.8 oz)   SpO2 98%   BMI 32.43 kg/m      Wt Readings from Last 4 Encounters:   09/10/22 111.1 kg (245 lb)   09/06/22 112.4 kg (247 lb 12.8 oz)   08/18/22 112.5 kg (248 lb)   08/18/22 112.3 kg (247 lb 8 oz)      General: NAD.  HEENT: sclera anicteric. Oropharynx with very mild lichenoid changes on bilateral inner cheeks, and left buccal ulcer-smaller and overall healing.  No ulcerations.  Lungs:  CTA b/l  no wheezes  CV: RRR  no gallop  Abd; soft no tenderness; BS nl    Ext/ Skin: Hyperpigmentation noted on torso, back and anterior pelvis.  LE Trace edema, no skin thickening.    cGVHD therapy started on February 24 2022  - Baseline NIH scoring 2/24/2022 : skin 2 maculopapular rash/erythema with no sclerotic features, mouth score 1 mild symptoms with lichenoid changes less than 25%, eyes score 2 moderate symptoms without new visual impairments due to KCS requiring lubricant eyedrops more than 3 times a day, overall mild scale for her provider  - 3/25/2022 1 month NIH treatment assessment on PQRST: skin 2, mouth score 1 mild symptoms with NO lichenoid changes, eyes score 2 moderate symptoms w requiring lubricant eyedrops more than 3 times a day, liver score 1 ALT 3.7x ULN and nl bilirubin   - 3/31  Mouth clear; eyes minimal LFT still abn; no joint or new GI sx  - 4/28 Mouth clear, Left>R eye is mild sclera erythema, lids are pink and irritated appearing, LFT's slow improvement, no new joint, skin, or GI symptoms.   -5/11 mouth with mild erythema but no lichenoid changes, eyes using eyedrops 4-6 times a day score of 2, LFTs significantly improved from baseline slight elevation in alk phos and AST with normal bilirubin, skin with hyperpigmentation from old acute GVHD no active skin rashes, no GI symptoms no musculoskeletal complaints.  -5/26 Mouth with very slight lichenoid changes, erythema.  Eyes still using eyedrops 4-6x per day.  LFTs essentially normal with slight elevation in alk phos.  Skin with hyperpigmentation from old acute GVHD, no active rashes, no GI or MSK concerns.  Skin 0, mouth 0 but with lichenoid changes, eyes 2  -6/7/22 Mouth with no lichenoid changes, erythema.  Eyes still using eyedrops 4-6x per day.  LFTs essentially normal with slight elevation in alk phos.  Skin with hyperpigmentation from old acute GVHD, no active rashes, no GI or MSK concerns.  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 S/p allo sib PBSCT. (ABO matched)  HCT-CI score: 3 (prior solid tumor)  Day +100 bone marrow biopsy is 100% donor with no morphologic or flow cytometric evidence of leukemia BCR abl is pending.  - Day +180 restaging BM bx- still in CR  100% donor;  2/16/22 BCR ABL major breakpoint undetectable.   - 1 year restaging 8/18/2022: Markedly hypocellular bone marrow [less than 10% cellular] with maturing trilineage hematopoiesis and no definite morphologic or immunophenotypic evidence for involvement by B lymphoblastic leukemia. BCRABL1 PCR not detected, bone marrow % donor. FISH NORMAL No evidence of BCR-ABL1 fusion  - Discussed with the patient maintenance with TKI posttransplantation given his Ph positive ALL that have not been started previously presumed secondary to multiple active issues specifically infections and COVID.  We will reconsider this patient will think about whether this is something he would like to consider he was previously on Dasatinib, we would start on a low dose and increase as tolerated.    2.  HEME:  - Transfuse for hgb <7g/dL; plt < 10k.  No transfusions needs  - Pulmonary emboli: He developed a pulmonary emboli in the setting of receiving PEG asparaginase (ie provoked thrombus).  Xarelto complete.   - Counts are tolerating valcyte well.     3.  FEN/Renal:  - Cr improved to 1.4-1.3  - Magnesium 1.5, was hypomag likely due to tac. PO magnesium supplementation for now  - He has a history of renal cancer and resection. Has a single kidney.    4.    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   #Skin GVHD- history of biopsy proven  GVHD of the skin 8/30/2021; resolved.   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 Mild flares with transaminitis  # Ocular GVHD: follows with ophthalmology. Maxitrol to lids, continue refreshing drops QID. Score 1-2  # Oral GVHD: dex s/s three times a day; tac ointment to lips as needed.   - Cont tac to 1mg BID. previously decided to stay on same dose, no checks of levels if clinically stable, and no need switch to sirolimus. (keep tac low as gvhd is quiet and viremia). 5/10 level 45 with starting of COVID therapy and D-D intractions (Paxlovid for COVID19, which has a drug interaction with tacrolimus-Ritonavir increases serum levels of tacrolimus).   Tacrolimus level subtherapeutic, increase to 2.5 mg twice daily recently, 8/23/2022 instructed to change tacrolimus to 2 mg twice daily when he starts posaconazole and will repeat levels on Monday knows to hold dose. 9/6 with mild NEGRA, hyperkalemia, hypomagnesemia, and reports some tremors and cramps, suspect tacrolimus to be high therefore empirically decreased to 1.5 twice daily, skip morning dose of tacrolimus and took 2 mg today after his afternoon labs. Decrease to 1mg BID on Saturday    3/1-3/16: prednisone 100mg every day  3/17 75mg every day   3/31 75 alt with 50  4/6: 75 alt with 25mg every other day  4/12 pred tapered to 60 alternating with 25mg   4/15 In setting of viruses trying to reactivate,GVHD stable: Taper 60/15mg alternating.  4/20 decrease pred further to 50/10.    4/25 taper pred to 50/0 every other day as long as symptoms stable.   5/2 Pred decrease 40/0 alternating every other day.   5/13 decrease to 30/0  5/20 decrease to 20/0 then slowly by 5 mg weekly  6/7 decrease to 10/0  Went up to 15/0 with mild increased LFTs  8/23/2022 increased to 20/0, with persistence of oral ulcers and active ocular GVHD.  Discussed with the patient the importance of stopping chewing of nicotine gums for prolonged hours as  that would not help with the healing of the oral ulcers.  I discussed with the patient alternatives of nicotine patches that he will reconsider and let me know.  9/6 decreased to 15 alternating with 0  9/13 decreased to 10 alternating with 0    5.  ID: Afebrile   #COVID   Positive via homed test 5/8. Treated with Paxlovid with resolution of symptoms.  Had recurrence on 5/18.  Now asymptomatic.  Covid 11/21, 12/21, due for booster will recommend locally but he would like it here     #Pulmonary infiltrates:   4/20 CT chest with new RUL infiltrate. Highly immunocompromised. 4/22 Fungitell/asp GM were neg. BAL 4/29 NGTD, increased micafungin to 150mg IV daily-- f/u 6/1 Dr Garcia CT with mixed results, followed with Dr. Garcia August 2022 with resolution of pulmonary nodules and normalization of LFTs we will switch patient will switch patient to posaconazole prophylactic dose and monitor LFTs and any infectious concerns closely.    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 4/15 started valcyte 900mg PO BID. 4/20 CMV <137, 5/10 ND, decreased to 450mg BID 4/30 - continue 4 weeks as long as CMV <137 till 5/28 + weekly CMV  - Switch to acyclovir after completion of current therapy 5/28. 8/18 CMV ND. Check monthly on IST    - PPx: ACV, micafungin 150mg daily. Pentamidine given 5/13/2022 did not tolerate atovaqone, overdue we will schedule on 8/29. Azithro 250mg daily (stopped levaquin due to possible tendonitis).   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 S/p 4/22 and 5/4/22 rituximab 375mg/m2 5/10 ND x2, 6/7 ND, 8/18/2022 971, check every other month on IST  S/p covid vaccine series 12/2021  S/p evusheld  Deferred annual vaccines in the setting of GVHD and immunosuppression therapy, discussed with patient    6. Endo: Hx of graves disease; On synthroid 175 mcg daily. TSH normal 4/6/2022 no reflex to T4.     7. CV: Norvasc: 2.5mg, increase to 5mg 9/6 8. GI:   -Omeprazole for heartburn  -LFTs as above, now  normal, 9/6 decrease ursodiol to daily, monitor, posaconazole level from August 2022 within normal.  Will defer starting statins for hyperlipidemia given recent transaminitis and start of posaconazole.  Discussed with the patient.    9. Psych:   - Situational anxiety - lexapro 10mg daily.   - Insomnia: worse on steroids. Ativan, trazadone, melatonin    10. MSK: left food drop, PT. Occasional muscle cramps discussed optimizing vitamin D levels and considering vitamin D, some of the symptomatology of muscle cramps are likely related to chronic GVHD.    11. HCM/Age appropriate cancer screening:  -Discussed with the patient importance of age-appropriate cancer screening including colonoscopies, he is due for 1.  -Discussed importance of at least once a year vitamin D level check, 8/2020 236 low normal will start calcium and vitamin D replacement and recheck levels in 2 to 3 months  -Screening for secondary iron overload, 8/2022 ferritin 1023 we will recheck in 2-3 months  -Needs yearly thyroid check and lipid profile check, 8/2022 cholesterol 290 elevated, triglycerides 146 and LDL elevated at 162 discussed with the patient he was on statins prior to transplantation that needs to be restarted [place] will hold off given start of posaconazole and recent transaminitis and LFT abnormalities related to GVHD see GI section above  -9/6/2022 DEXA scan Based on BMD diagnosis is consistent with normal bone density based on WHO criteria Ref. 1   -Needs PFTs. 9/20  -Metabolic other, 8/2022 testosterone within normal    Plan:    - Decreased tacro to 1 mg BID, pending level from today  - Decrease prednisone to 10/0mg  - Discontinue ursodiol   - Magnesium daily  - Thursday after ophthalmo labs and possible fluids  - 9/20 follow up with me as DOM with prior labs         I spent 30 minutes in the care of this patient today, which included time necessary for preparation for the visit, obtaining history, ordering  medications/tests/procedures as medically indicated, review of pertinent medical literature, counseling of the patient, communication of recommendations to the care team, and documentation time.        Again, thank you for allowing me to participate in the care of your patient.      Sincerely,    Akshat Tobar MD

## 2022-09-14 NOTE — TELEPHONE ENCOUNTER
I spoke to Ceasar at [employer]. He states this is covered and does not need a PA. Test claim indicates this will pay #90/30 day or 270/90 days.     Prior Authorization Not Needed per Insurance    Medication: posaconazole (NOXAFIL) 100 MG EC tablet-PA Not Needed  Insurance Company: [employer] - Phone [phone] Fax [phone]  Expected CoPay:      Pharmacy Filling the Rx: [facility] - [place], [place] - [address] [facility]  Pharmacy Notified: No  Patient Notified: Yes

## 2022-09-15 ENCOUNTER — APPOINTMENT (OUTPATIENT)
Dept: LAB | Facility: CLINIC | Age: 62
End: 2022-09-15
Attending: INTERNAL MEDICINE
Payer: COMMERCIAL

## 2022-09-15 ENCOUNTER — INFUSION THERAPY VISIT (OUTPATIENT)
Dept: TRANSPLANT | Facility: CLINIC | Age: 62
End: 2022-09-15
Attending: INTERNAL MEDICINE
Payer: COMMERCIAL

## 2022-09-15 ENCOUNTER — OFFICE VISIT (OUTPATIENT)
Dept: OPHTHALMOLOGY | Facility: CLINIC | Age: 62
End: 2022-09-15
Payer: COMMERCIAL

## 2022-09-15 VITALS
DIASTOLIC BLOOD PRESSURE: 82 MMHG | TEMPERATURE: 98 F | RESPIRATION RATE: 18 BRPM | HEART RATE: 77 BPM | WEIGHT: 249.5 LBS | OXYGEN SATURATION: 98 % | BODY MASS INDEX: 32.92 KG/M2 | SYSTOLIC BLOOD PRESSURE: 142 MMHG

## 2022-09-15 DIAGNOSIS — D89.813 GVHD AS COMPLICATION OF BONE MARROW TRANSPLANT (H): ICD-10-CM

## 2022-09-15 DIAGNOSIS — H52.4 HYPEROPIA WITH PRESBYOPIA OF BOTH EYES: ICD-10-CM

## 2022-09-15 DIAGNOSIS — H04.129 DRY EYE: Primary | ICD-10-CM

## 2022-09-15 DIAGNOSIS — T86.09 GVHD AS COMPLICATION OF BONE MARROW TRANSPLANT (H): ICD-10-CM

## 2022-09-15 DIAGNOSIS — H52.03 HYPEROPIA WITH PRESBYOPIA OF BOTH EYES: ICD-10-CM

## 2022-09-15 DIAGNOSIS — C91.01 ACUTE LYMPHOBLASTIC LEUKEMIA (ALL) IN REMISSION (H): ICD-10-CM

## 2022-09-15 DIAGNOSIS — H16.123 FILAMENTARY KERATITIS OF BOTH EYES: ICD-10-CM

## 2022-09-15 DIAGNOSIS — Z94.81 STATUS POST BONE MARROW TRANSPLANT (H): ICD-10-CM

## 2022-09-15 LAB
ANION GAP SERPL CALCULATED.3IONS-SCNC: 11 MMOL/L (ref 7–15)
BASOPHILS # BLD AUTO: 0 10E3/UL (ref 0–0.2)
BASOPHILS NFR BLD AUTO: 1 %
BUN SERPL-MCNC: 42.9 MG/DL (ref 8–23)
CALCIUM SERPL-MCNC: 9.9 MG/DL (ref 8.8–10.2)
CHLORIDE SERPL-SCNC: 101 MMOL/L (ref 98–107)
CREAT SERPL-MCNC: 1.23 MG/DL (ref 0.67–1.17)
DEPRECATED HCO3 PLAS-SCNC: 23 MMOL/L (ref 22–29)
EOSINOPHIL # BLD AUTO: 0.1 10E3/UL (ref 0–0.7)
EOSINOPHIL NFR BLD AUTO: 1 %
ERYTHROCYTE [DISTWIDTH] IN BLOOD BY AUTOMATED COUNT: 15.3 % (ref 10–15)
GFR SERPL CREATININE-BSD FRML MDRD: 66 ML/MIN/1.73M2
GLUCOSE SERPL-MCNC: 106 MG/DL (ref 70–99)
HCT VFR BLD AUTO: 36.7 % (ref 40–53)
HGB BLD-MCNC: 12.8 G/DL (ref 13.3–17.7)
IMM GRANULOCYTES # BLD: 0.1 10E3/UL
IMM GRANULOCYTES NFR BLD: 1 %
LYMPHOCYTES # BLD AUTO: 1.9 10E3/UL (ref 0.8–5.3)
LYMPHOCYTES NFR BLD AUTO: 27 %
MAGNESIUM SERPL-MCNC: 1.6 MG/DL (ref 1.7–2.3)
MCH RBC QN AUTO: 35 PG (ref 26.5–33)
MCHC RBC AUTO-ENTMCNC: 34.9 G/DL (ref 31.5–36.5)
MCV RBC AUTO: 100 FL (ref 78–100)
MONOCYTES # BLD AUTO: 1.1 10E3/UL (ref 0–1.3)
MONOCYTES NFR BLD AUTO: 16 %
NEUTROPHILS # BLD AUTO: 3.8 10E3/UL (ref 1.6–8.3)
NEUTROPHILS NFR BLD AUTO: 54 %
NRBC # BLD AUTO: 0 10E3/UL
NRBC BLD AUTO-RTO: 0 /100
PLATELET # BLD AUTO: 148 10E3/UL (ref 150–450)
POTASSIUM SERPL-SCNC: 5.1 MMOL/L (ref 3.4–5.3)
RBC # BLD AUTO: 3.66 10E6/UL (ref 4.4–5.9)
SODIUM SERPL-SCNC: 135 MMOL/L (ref 136–145)
TACROLIMUS BLD-MCNC: 6.9 UG/L (ref 5–15)
TME LAST DOSE: NORMAL H
TME LAST DOSE: NORMAL H
WBC # BLD AUTO: 7 10E3/UL (ref 4–11)

## 2022-09-15 PROCEDURE — 80197 ASSAY OF TACROLIMUS: CPT

## 2022-09-15 PROCEDURE — 36591 DRAW BLOOD OFF VENOUS DEVICE: CPT

## 2022-09-15 PROCEDURE — 96365 THER/PROPH/DIAG IV INF INIT: CPT

## 2022-09-15 PROCEDURE — 250N000011 HC RX IP 250 OP 636: Performed by: INTERNAL MEDICINE

## 2022-09-15 PROCEDURE — 80048 BASIC METABOLIC PNL TOTAL CA: CPT

## 2022-09-15 PROCEDURE — 99213 OFFICE O/P EST LOW 20 MIN: CPT | Performed by: OPTOMETRIST

## 2022-09-15 PROCEDURE — 258N000003 HC RX IP 258 OP 636: Performed by: INTERNAL MEDICINE

## 2022-09-15 PROCEDURE — 83735 ASSAY OF MAGNESIUM: CPT

## 2022-09-15 PROCEDURE — 85025 COMPLETE CBC W/AUTO DIFF WBC: CPT

## 2022-09-15 RX ORDER — HEPARIN SODIUM (PORCINE) LOCK FLUSH IV SOLN 100 UNIT/ML 100 UNIT/ML
5 SOLUTION INTRAVENOUS ONCE
Status: COMPLETED | OUTPATIENT
Start: 2022-09-15 | End: 2022-09-15

## 2022-09-15 RX ADMIN — SODIUM CHLORIDE 1000 ML: 9 INJECTION, SOLUTION INTRAVENOUS at 10:24

## 2022-09-15 RX ADMIN — SODIUM CHLORIDE, PRESERVATIVE FREE 5 ML: 5 INJECTION INTRAVENOUS at 11:26

## 2022-09-15 ASSESSMENT — VISUAL ACUITY
OD_PH_CC: 20/25
METHOD: SNELLEN - LINEAR
METHOD: SNELLEN - LINEAR
OS_CC: 20/25
OD_SC: 20/50
OS_CC+: -2
CORRECTION_TYPE: CONTACTS
OD_CC: 20/150
OD_PH_CC+: -2

## 2022-09-15 ASSESSMENT — CONF VISUAL FIELD
OD_NORMAL: 1
OS_NORMAL: 1
METHOD: COUNTING FINGERS

## 2022-09-15 ASSESSMENT — EXTERNAL EXAM - RIGHT EYE: OD_EXAM: DERMATOCHALASIS

## 2022-09-15 ASSESSMENT — PAIN SCALES - GENERAL: PAINLEVEL: NO PAIN (0)

## 2022-09-15 ASSESSMENT — TONOMETRY
OD_IOP_MMHG: 13
IOP_METHOD: ICARE
OS_IOP_MMHG: 12

## 2022-09-15 ASSESSMENT — SLIT LAMP EXAM - LIDS
COMMENTS: 1+ BLEPH, THICKENED MARGINS, 1-2+ MGD
COMMENTS: 1+ BLEPH, THICKENED MARGINS, 1-2+ MGD

## 2022-09-15 ASSESSMENT — EXTERNAL EXAM - LEFT EYE: OS_EXAM: DERMATOCHALASIS

## 2022-09-15 NOTE — NURSING NOTE
Chief Complaints and History of Present Illnesses   Patient presents with     Dry Eye Syndrome Follow Up     Chief Complaint(s) and History of Present Illness(es)     Dry Eye Syndrome Follow Up     Laterality: both eyes    Associated symptoms: red eyes and photophobia.  Negative for eye pain and burning    Treatments tried: artificial tears              Comments     Here for dry eyes follow up both eyes. Vision have slightly improved since last visit. He notes mild redness and photophobia with some tearing in both eyes. Uses drops as instructed. No flashes or floaters. No eye pain.    Jered Hall COT 8:07 AM September 15, 2022

## 2022-09-15 NOTE — LETTER
Date:September 17, 2022      Provider requested that no letter be sent. Do not send.       [facility]

## 2022-09-15 NOTE — PROGRESS NOTES
A/P  1.) Dry Eye OU  -s/p BMT 08/2021  -Worsening dryness right eye>left eye, symptomatic for photophobia/tearing  -Right eye cornea improved with BCL wear (though not in today), left eye stable without sig stain  -Failed: Restasis/Xiidra (stinging), plugs (scarred puncta)  -Currently on: AT 3-10x/day as needed.   -Doing well on BCL with daily oflox right eye, FML bid OU  -Replaced BCL today. Continue current regimen  -Consider monthly replacement of BCL for now, until eyes have stabilized    2.) Hyperopia/Presbyopia OU  -Previously BCVA 20/20 with correction. Uses low plus readers for distance and higher plus readers for near  -Right eye acuity back to baseline    RTC 1 month recheck, sooner prn    I have confirmed the patient's CC, HPI and reviewed Past Medical History, Past Surgical History, Social History, Family History, Problem List, Medication List and agree with Tech note.     Aisha Juarez, OD [facility]

## 2022-09-15 NOTE — PROGRESS NOTES
Infusion Nursing Note:  Nik MCDONALD Victorianocolt presents today for add-on infusion.    Patient seen by provider today: No   present during visit today: Not Applicable.    Note: 1 L NS bolus given for elevated creatinine per Avila Tobar's request.     Intravenous Access:  Implanted Port.    Treatment Conditions:  Results reviewed, labs MET treatment parameters, ok to proceed with treatment.    Post Infusion Assessment:  Patient tolerated infusion without incident.     Discharge Plan:   Patient and/or family verbalized understanding of discharge instructions and all questions answered.      Rima Simon RN

## 2022-09-15 NOTE — LETTER
9/15/2022         RE: Nik Nava  [address]  [place] [address]        Dear Colleague,    Thank you for referring your patient, Nik Nava, to the [facility] BLOOD AND MARROW TRANSPLANT PROGRAM [place]. Please see a copy of my visit note below.    Infusion Nursing Note:  Nik Nava presents today for add-on infusion.    Patient seen by provider today: No   present during visit today: Not Applicable.    Note: 1 L NS bolus given for elevated creatinine per Avila Tobar's request.     Intravenous Access:  Implanted Port.    Treatment Conditions:  Results reviewed, labs MET treatment parameters, ok to proceed with treatment.    Post Infusion Assessment:  Patient tolerated infusion without incident.     Discharge Plan:   Patient and/or family verbalized understanding of discharge instructions and all questions answered.      Rima Simon RN                          Again, thank you for allowing me to participate in the care of your patient.        Sincerely,        [facility]

## 2022-09-19 ENCOUNTER — TELEPHONE (OUTPATIENT)
Dept: OPTOMETRY | Facility: CLINIC | Age: 62
End: 2022-09-19

## 2022-09-19 NOTE — TELEPHONE ENCOUNTER
Called and spoke to Nik العلي by Dr. Juarez for 9/20 @ 1145 am     Cheryle Heath Communication Facilitator on 9/19/2022 at 11:51 AM

## 2022-09-19 NOTE — TELEPHONE ENCOUNTER
" [facility] Call Center    Phone Message    May a detailed message be left on voicemail: yes     Reason for Call: Other: Pt was in to see Dr. Juarez last week and a \"bandaid contact\" was put in. He states it popped out and is wondering if he can come in on 9/20 to see her to have this put back in. [place] [facility] [place] so this would be most convenient for him. Please call pt to discuss. Thank you.     Action Taken: Message routed to:  [facility] ([facility] EYE    Travel Screening: Not Applicable                                                                        "

## 2022-09-20 ENCOUNTER — HOME INFUSION (PRE-WILLOW HOME INFUSION) (OUTPATIENT)
Dept: PHARMACY | Facility: CLINIC | Age: 62
End: 2022-09-20

## 2022-09-20 ENCOUNTER — ONCOLOGY VISIT (OUTPATIENT)
Dept: TRANSPLANT | Facility: CLINIC | Age: 62
End: 2022-09-20
Attending: INTERNAL MEDICINE
Payer: COMMERCIAL

## 2022-09-20 ENCOUNTER — OFFICE VISIT (OUTPATIENT)
Dept: OPTOMETRY | Facility: CLINIC | Age: 62
End: 2022-09-20
Payer: COMMERCIAL

## 2022-09-20 VITALS
SYSTOLIC BLOOD PRESSURE: 129 MMHG | OXYGEN SATURATION: 97 % | BODY MASS INDEX: 32.19 KG/M2 | TEMPERATURE: 97.9 F | WEIGHT: 244 LBS | DIASTOLIC BLOOD PRESSURE: 79 MMHG | HEART RATE: 84 BPM | RESPIRATION RATE: 16 BRPM

## 2022-09-20 DIAGNOSIS — Z94.81 S/P BONE MARROW TRANSPLANT (H): Primary | ICD-10-CM

## 2022-09-20 DIAGNOSIS — H04.129 DRY EYE: Primary | ICD-10-CM

## 2022-09-20 DIAGNOSIS — Z94.81 STATUS POST BONE MARROW TRANSPLANT (H): ICD-10-CM

## 2022-09-20 DIAGNOSIS — D89.813 GVHD AS COMPLICATION OF BONE MARROW TRANSPLANT (H): ICD-10-CM

## 2022-09-20 DIAGNOSIS — H16.123 FILAMENTARY KERATITIS OF BOTH EYES: ICD-10-CM

## 2022-09-20 DIAGNOSIS — D89.813 GVHD AS COMPLICATION OF BONE MARROW TRANSPLANT (H): Primary | ICD-10-CM

## 2022-09-20 DIAGNOSIS — T86.09 GVHD AS COMPLICATION OF BONE MARROW TRANSPLANT (H): Primary | ICD-10-CM

## 2022-09-20 DIAGNOSIS — Z94.84 HISTORY OF PERIPHERAL STEM CELL TRANSPLANT (H): ICD-10-CM

## 2022-09-20 DIAGNOSIS — C91.01 ACUTE LYMPHOBLASTIC LEUKEMIA (ALL) IN REMISSION (H): ICD-10-CM

## 2022-09-20 DIAGNOSIS — T86.09 GVHD AS COMPLICATION OF BONE MARROW TRANSPLANT (H): ICD-10-CM

## 2022-09-20 LAB
ALBUMIN SERPL BCG-MCNC: 3.9 G/DL (ref 3.5–5.2)
ALP SERPL-CCNC: 108 U/L (ref 40–129)
ALT SERPL W P-5'-P-CCNC: 26 U/L (ref 10–50)
ANION GAP SERPL CALCULATED.3IONS-SCNC: 12 MMOL/L (ref 7–15)
AST SERPL W P-5'-P-CCNC: 40 U/L (ref 10–50)
BASOPHILS # BLD AUTO: 0 10E3/UL (ref 0–0.2)
BASOPHILS NFR BLD AUTO: 0 %
BILIRUB SERPL-MCNC: 0.8 MG/DL
BUN SERPL-MCNC: 50.6 MG/DL (ref 8–23)
CALCIUM SERPL-MCNC: 10.1 MG/DL (ref 8.8–10.2)
CHLORIDE SERPL-SCNC: 104 MMOL/L (ref 98–107)
CREAT SERPL-MCNC: 1.45 MG/DL (ref 0.67–1.17)
DEPRECATED HCO3 PLAS-SCNC: 23 MMOL/L (ref 22–29)
EOSINOPHIL # BLD AUTO: 0 10E3/UL (ref 0–0.7)
EOSINOPHIL NFR BLD AUTO: 0 %
ERYTHROCYTE [DISTWIDTH] IN BLOOD BY AUTOMATED COUNT: 15.3 % (ref 10–15)
GFR SERPL CREATININE-BSD FRML MDRD: 54 ML/MIN/1.73M2
GLUCOSE SERPL-MCNC: 152 MG/DL (ref 70–99)
HCT VFR BLD AUTO: 36.3 % (ref 40–53)
HGB BLD-MCNC: 13 G/DL (ref 13.3–17.7)
IMM GRANULOCYTES # BLD: 0.2 10E3/UL
IMM GRANULOCYTES NFR BLD: 2 %
LYMPHOCYTES # BLD AUTO: 1.9 10E3/UL (ref 0.8–5.3)
LYMPHOCYTES NFR BLD AUTO: 18 %
MAGNESIUM SERPL-MCNC: 1.6 MG/DL (ref 1.7–2.3)
MCH RBC QN AUTO: 35.5 PG (ref 26.5–33)
MCHC RBC AUTO-ENTMCNC: 35.8 G/DL (ref 31.5–36.5)
MCV RBC AUTO: 99 FL (ref 78–100)
MONOCYTES # BLD AUTO: 0.9 10E3/UL (ref 0–1.3)
MONOCYTES NFR BLD AUTO: 8 %
NEUTROPHILS # BLD AUTO: 7.6 10E3/UL (ref 1.6–8.3)
NEUTROPHILS NFR BLD AUTO: 72 %
NRBC # BLD AUTO: 0 10E3/UL
NRBC BLD AUTO-RTO: 0 /100
PLATELET # BLD AUTO: 176 10E3/UL (ref 150–450)
POTASSIUM SERPL-SCNC: 4.9 MMOL/L (ref 3.4–5.3)
PROT SERPL-MCNC: 6.4 G/DL (ref 6.4–8.3)
RBC # BLD AUTO: 3.66 10E6/UL (ref 4.4–5.9)
SODIUM SERPL-SCNC: 139 MMOL/L (ref 136–145)
WBC # BLD AUTO: 10.6 10E3/UL (ref 4–11)

## 2022-09-20 PROCEDURE — 36591 DRAW BLOOD OFF VENOUS DEVICE: CPT

## 2022-09-20 PROCEDURE — 250N000011 HC RX IP 250 OP 636: Performed by: PHYSICIAN ASSISTANT

## 2022-09-20 PROCEDURE — 83735 ASSAY OF MAGNESIUM: CPT

## 2022-09-20 PROCEDURE — 94729 DIFFUSING CAPACITY: CPT | Performed by: INTERNAL MEDICINE

## 2022-09-20 PROCEDURE — G0463 HOSPITAL OUTPT CLINIC VISIT: HCPCS

## 2022-09-20 PROCEDURE — 94726 PLETHYSMOGRAPHY LUNG VOLUMES: CPT | Performed by: INTERNAL MEDICINE

## 2022-09-20 PROCEDURE — 94375 RESPIRATORY FLOW VOLUME LOOP: CPT | Performed by: INTERNAL MEDICINE

## 2022-09-20 PROCEDURE — 85025 COMPLETE CBC W/AUTO DIFF WBC: CPT

## 2022-09-20 PROCEDURE — 80053 COMPREHEN METABOLIC PANEL: CPT

## 2022-09-20 PROCEDURE — 99215 OFFICE O/P EST HI 40 MIN: CPT

## 2022-09-20 RX ORDER — HEPARIN SODIUM,PORCINE 10 UNIT/ML
5 VIAL (ML) INTRAVENOUS
Status: CANCELLED | OUTPATIENT
Start: 2022-09-20

## 2022-09-20 RX ORDER — HEPARIN SODIUM (PORCINE) LOCK FLUSH IV SOLN 100 UNIT/ML 100 UNIT/ML
5 SOLUTION INTRAVENOUS
Status: CANCELLED | OUTPATIENT
Start: 2022-09-20

## 2022-09-20 RX ORDER — HEPARIN SODIUM (PORCINE) LOCK FLUSH IV SOLN 100 UNIT/ML 100 UNIT/ML
5 SOLUTION INTRAVENOUS
Status: DISCONTINUED | OUTPATIENT
Start: 2022-09-20 | End: 2022-09-21 | Stop reason: HOSPADM

## 2022-09-20 RX ADMIN — SODIUM CHLORIDE, PRESERVATIVE FREE 5 ML: 5 INJECTION INTRAVENOUS at 15:12

## 2022-09-20 ASSESSMENT — EXTERNAL EXAM - RIGHT EYE: OD_EXAM: DERMATOCHALASIS

## 2022-09-20 ASSESSMENT — VISUAL ACUITY
OS_CC+: -1
OS_CC: 20/40
CORRECTION_TYPE: GLASSES
OD_CC: 20/25
METHOD: SNELLEN - LINEAR
OD_CC+: -2

## 2022-09-20 ASSESSMENT — CONF VISUAL FIELD
METHOD: COUNTING FINGERS
OS_NORMAL: 1
OD_NORMAL: 1

## 2022-09-20 ASSESSMENT — SLIT LAMP EXAM - LIDS
COMMENTS: 1+ BLEPH, THICKENED MARGINS, 1-2+ MGD
COMMENTS: 1+ BLEPH, THICKENED MARGINS, 1-2+ MGD

## 2022-09-20 ASSESSMENT — PAIN SCALES - GENERAL: PAINLEVEL: MODERATE PAIN (4)

## 2022-09-20 ASSESSMENT — EXTERNAL EXAM - LEFT EYE: OS_EXAM: DERMATOCHALASIS

## 2022-09-20 NOTE — PROGRESS NOTES
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    HPI:   Nik returns for follow up, Lamar is on the phone.   Seen by ophthalmology today, stable/improvement in ocular symptoms, using 3-4 times eye drops, mouth about the same, no new ulcers, intermtitent improvement in mid paraspinal pain, denies any bony tenderness denies any radiation numbness or weakness. No new chinyere seeing derm tomorrow. ood oral intake, No rashes or dry skin, abdominal pain, nausea/vomiting, good appetite, or diarrhea. No bleeding, no  concerns.      Review of Systems                                                                                                                                         10 point Review of systems was negative     PHYSICAL EXAM                                                                                                                                                 KPS: 80  /79   Pulse 84   Temp 97.9  F (36.6  C) (Oral)   Resp 16   Wt 110.7 kg (244 lb)   SpO2 97%   BMI 32.19 kg/m      Wt Readings from Last 4 Encounters:   09/20/22 110.7 kg (244 lb)   09/15/22 113.2 kg (249 lb 8 oz)   09/13/22 111.5 kg (245 lb 12.8 oz)   09/10/22 111.1 kg (245 lb)      General: NAD.  HEENT: sclera anicteric. Oropharynx with mild lichenoid changes on bilateral inner cheeks-more prominent on the left.  No ulcerations.  Lungs:  CTA b/l  no wheezes  CV: RRR  no gallop  Abd; soft no tenderness; BS nl   Ext/ Skin: Hyperpigmentation noted on torso, back and anterior pelvis.  LE Trace edema, no skin thickening.    cGVHD therapy started on February 24 2022  - Baseline NIH scoring 2/24/2022 : skin 2 maculopapular rash/erythema with no sclerotic features, mouth score 1 mild symptoms with lichenoid changes less than 25%, eyes score 2 moderate symptoms without new visual impairments due to KCS requiring lubricant eyedrops more than 3  times a day, overall mild scale for her provider  - 3/25/2022 1 month [facility] treatment assessment on PQRST: skin 2, mouth score 1 mild symptoms with NO lichenoid changes, eyes score 2 moderate symptoms w requiring lubricant eyedrops more than 3 times a day, liver score 1 ALT 3.7x ULN and nl bilirubin   - 3/31  Mouth clear; eyes minimal LFT still abn; no joint or new GI sx  - 4/28 Mouth clear, Left>R eye is mild sclera erythema, lids are pink and irritated appearing, LFT's slow improvement, no new joint, skin, or GI symptoms.   -5/11 mouth with mild erythema but no lichenoid changes, eyes using eyedrops 4-6 times a day score of 2, LFTs significantly improved from baseline slight elevation in alk phos and AST with normal bilirubin, skin with hyperpigmentation from old acute GVHD no active skin rashes, no GI symptoms no musculoskeletal complaints.  -5/26 Mouth with very slight lichenoid changes, erythema.  Eyes still using eyedrops 4-6x per day.  LFTs essentially normal with slight elevation in alk phos.  Skin with hyperpigmentation from old acute GVHD, no active rashes, no GI or MSK concerns.  Skin 0, mouth 0 but with lichenoid changes, eyes 2  -6/7/22 Mouth with no lichenoid changes, erythema.  Eyes still using eyedrops 4-6x per day.  LFTs essentially normal with slight elevation in alk phos.  Skin with hyperpigmentation from old acute GVHD, no active rashes, no GI or MSK concerns.  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  Acute lymphoblastic leukemia, [place] chromosome positive in CR, MRD neg. S/p allo sib PBSCT. (ABO matched)  HCT-CI score: 3 (prior solid tumor)  Day +100 bone marrow  biopsy is 100% donor with no morphologic or flow cytometric evidence of leukemia BCR abl is pending.  - Day +180 restaging BM bx- still in CR  100% donor;  2/16/22 BCR ABL major breakpoint undetectable.   - 1 year restaging 8/18/2022: Markedly hypocellular bone marrow [less than 10% cellular] with maturing trilineage hematopoiesis and no definite morphologic or immunophenotypic evidence for involvement by B lymphoblastic leukemia. BCRABL1 PCR not detected, bone marrow % donor. FISH NORMAL No evidence of BCR-ABL1 fusion  - Discussed with the patient maintenance with TKI posttransplantation given his Ph positive ALL that have not been started previously presumed secondary to multiple active issues specifically infections and COVID.  We will reconsider this patient will think about whether this is something he would like to consider he was previously on Dasatinib, we would start on a low dose and increase as tolerated.    2.  HEME:  - Transfuse for hgb <7g/dL; plt < 10k.  No transfusions needs  - Pulmonary emboli: He developed a pulmonary emboli in the setting of receiving PEG asparaginase (ie provoked thrombus).  Xarelto complete.   - Counts are tolerating valcyte well.     3.  FEN/Renal:  - Cr improved up to 1.45 today we will get fluids.  - Magnesium 1.6, hypomag secondary to tac. PO magnesium supplementation 3 pills twice daily.  - He has a history of renal cancer and resection. Has a single kidney.    4.    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   #Skin GVHD- history of biopsy proven GVHD of the skin 8/30/2021; resolved.   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 Mild flares with transaminitis  # Ocular GVHD: follows with ophthalmology. Maxitrol to lids, continue refreshing drops QID. Score 1-2  Seen 9/20 by Dr. Juarez:  1.) Dry Eye OU  -s/p BMT 08/2021  -Worsening dryness right eye>left eye, symptomatic for  photophobia/tearing  -Right eye cornea improved with BCL wear (though not in today), left eye stable without sig stain  -Failed: Restasis/Xiidra (stinging), plugs (scarred puncta)  -Currently on: AT 3-10x/day as needed.   -Doing well on BCL with daily oflox right eye, FML bid OU  -Replaced BCL today. Continue current regimen. Air Optix N&D  -Consider monthly replacement of BCL for now, until eyes have stabilized  2.) Hyperopia/Presbyopia OU  -Previously BCVA 20/20 with correction. Uses low plus readers for distance and higher plus readers for near  -Right eye acuity back to baseline    # Oral GVHD: dex s/s three times a day; tac ointment to lips as needed.   - Cont tac to 1mg BID. previously decided to stay on same dose, no checks of levels if clinically stable, and no need switch to sirolimus. (keep tac low as gvhd is quiet and viremia). 5/10 level 45 with starting of COVID therapy and D-D intractions (Paxlovid for COVID19, which has a drug interaction with tacrolimus-Ritonavir increases serum levels of tacrolimus).   Tacrolimus level subtherapeutic, increase to 2.5 mg twice daily recently, 8/23/2022 instructed to change tacrolimus to 2 mg twice daily when he starts posaconazole and will repeat levels on Monday knows to hold dose. 9/6 with mild NEGRA, hyperkalemia, hypomagnesemia, and reports some tremors and cramps, suspect tacrolimus to be high therefore empirically decreased to 1.5 twice daily, skip morning dose of tacrolimus and took 2 mg today after his afternoon labs. Decrease to 1mg BID on Saturday    3/1-3/16: prednisone 100mg every day  3/17 75mg every day   3/31 75 alt with 50  4/6: 75 alt with 25mg every other day  4/12 pred tapered to 60 alternating with 25mg   4/15 In setting of viruses trying to reactivate,GVHD stable: Taper 60/15mg alternating.  4/20 decrease pred further to 50/10.    4/25 taper pred to 50/0 every other day as long as symptoms stable.   5/2 Pred decrease 40/0 alternating every other  day.   5/13 decrease to 30/0  5/20 decrease to 20/0 then slowly by 5 mg weekly  6/7 decrease to 10/0  Went up to 15/0 with mild increased LFTs  8/23/2022 increased to 20/0, with persistence of oral ulcers and active ocular GVHD.  Discussed with the patient the importance of stopping chewing of nicotine gums for prolonged hours as that would not help with the healing of the oral ulcers.  I discussed with the patient alternatives of nicotine patches that he will reconsider and let me know.  9/6 decreased to 15 alternating with 0  9/13 decreased to 10 alternating with 0  9/21 we will discontinue tacrolimus given persistent NGERA and single kidney and start the patient on sirolimus without loading dose.  We will get a list of possible side effects and adverse events from the Pharm.D.    5.  ID: Afebrile   #COVID   Positive via homed test 5/8. Treated with Paxlovid with resolution of symptoms.  Had recurrence on 5/18.  Now asymptomatic.  Covid 11/21, 12/21, due for booster will recommend locally but he would like it here     #Pulmonary infiltrates:   4/20 CT chest with new RUL infiltrate. Highly immunocompromised. 4/22 Fungitell/asp GM were neg. BAL 4/29 NGTD, increased micafungin to 150mg IV daily-- f/u 6/1 Dr Garcia CT with mixed results, followed with Dr. Garcia August 2022 with resolution of pulmonary nodules and normalization of LFTs we will switch patient will switch patient to posaconazole prophylactic dose and monitor LFTs and any infectious concerns closely.    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 4/15 started valcyte 900mg PO BID. 4/20 CMV <137, 5/10 ND, decreased to 450mg BID 4/30 - continue 4 weeks as long as CMV <137 till 5/28 + weekly CMV  - Switch to acyclovir after completion of current therapy 5/28. 8/18 CMV ND. Check monthly on IST    - PPx: ACV, micafungin 150mg daily. Pentamidine given 5/13/2022 did not tolerate atovaqone, overdue we will schedule on 8/29. Azithro 250mg daily (stopped levaquin due  to possible tendonitis).   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 S/p 4/22 and 5/4/22 rituximab 375mg/m2 5/10 ND x2, 6/7 ND, 8/18/2022 971, check every other month on IST  S/p covid vaccine series 12/2021  S/p evusheld  Deferred annual vaccines in the setting of GVHD and immunosuppression therapy, discussed with patient    6. Endo: Hx of graves disease; On synthroid 175 mcg daily. TSH normal 4/6/2022 no reflex to T4.     7. CV: Norvasc: 2.5mg, increase to 5mg 9/6    8. GI:   -Omeprazole for heartburn  -LFTs as above, now normal, 9/6 decrease ursodiol to daily, monitor, posaconazole level from August 2022 within normal.  Will defer starting statins for hyperlipidemia given recent transaminitis and start of posaconazole, LFTs now within normal with the start of sirolimus will recheck couple weeks after start and initiate appropriate therapy.  Discussed with the patient.    9. Psych:   - Situational anxiety - lexapro 10mg daily.   - Insomnia: worse on steroids. Ativan, trazadone, melatonin    10. MSK: left food drop, PT. Occasional muscle cramps discussed optimizing vitamin D levels and considering vitamin D, some of the symptomatology of muscle cramps are likely related to chronic GVHD.    11. HCM/Age appropriate cancer screening:  -Discussed with the patient importance of age-appropriate cancer screening including colonoscopies, he is due for 1.  -Discussed importance of at least once a year vitamin D level check, 8/2020 236 low normal will start calcium and vitamin D replacement and recheck levels in 2 to 3 months  -Screening for secondary iron overload, 8/2022 ferritin 1023 we will recheck in 2-3 months  -Needs yearly thyroid check and lipid profile check, 8/2022 cholesterol 290 elevated, triglycerides 146 and LDL elevated at 162 discussed with the patient he was on statins prior to transplantation that needs to be restarted [place] will hold off given start of posaconazole and recent transaminitis  and LFT abnormalities related to GVHD see GI section above  -9/6/2022 DEXA scan Based on BMD diagnosis is consistent with normal bone density based on WHO criteria Ref. 1   -PFTs 9/20   -Metabolic other, 8/2022 testosterone within normal    Plan:    - Mag 3 pills/3 pills  - Vitamin E   - Stop tacro, start sirolimus 1mg daily today 9/21  - Continue prednisone to 10/0mg  - Monday labs, will call  - 1 L of normal saline 9/21 encourage hydration  - 10/11 follow up with me, to check CMV and EBV levels, lipid profile in the near future.       I spent 40 minutes in the care of this patient today, which included time necessary for preparation for the visit, obtaining history, ordering medications/tests/procedures as medically indicated, review of pertinent medical literature, counseling of the patient, communication of recommendations to the care team, and documentation time.

## 2022-09-20 NOTE — LETTER
9/20/2022         RE: Nik Nava  [address] [facility] [id number]        Dear Colleague,    Thank you for referring your patient, Nik Nava, to the [facility] [place]. Please see a copy of my visit note below.      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    HPI:   Nik returns for follow up, Lamar is on the phone.   Seen by ophthalmology today, stable/improvement in ocular symptoms, using 3-4 times eye drops, mouth about the same, no new ulcers, intermtitent improvement in mid paraspinal pain, denies any bony tenderness denies any radiation numbness or weakness. No new chinyere seeing derm tomorrow. ood oral intake, No rashes or dry skin, abdominal pain, nausea/vomiting, good appetite, or diarrhea. No bleeding, no  concerns.      Review of Systems                                                                                                                                         10 point Review of systems was negative     PHYSICAL EXAM                                                                                                                                                 KPS: 80  /79   Pulse 84   Temp 97.9  F (36.6  C) (Oral)   Resp 16   Wt 110.7 kg (244 lb)   SpO2 97%   BMI 32.19 kg/m      Wt Readings from Last 4 Encounters:   09/20/22 110.7 kg (244 lb)   09/15/22 113.2 kg (249 lb 8 oz)   09/13/22 111.5 kg (245 lb 12.8 oz)   09/10/22 111.1 kg (245 lb)      General: NAD.  HEENT: sclera anicteric. Oropharynx with mild lichenoid changes on bilateral inner cheeks-more prominent on the left.  No ulcerations.  Lungs:  CTA b/l  no wheezes  CV: RRR  no gallop  Abd; soft no tenderness; BS nl   Ext/ Skin: Hyperpigmentation noted on torso, back and anterior pelvis.  LE Trace edema, no skin thickening.    cGVHD therapy started on February 24  2022  - Baseline NIH scoring 2/24/2022 : skin 2 maculopapular rash/erythema with no sclerotic features, mouth score 1 mild symptoms with lichenoid changes less than 25%, eyes score 2 moderate symptoms without new visual impairments due to KCS requiring lubricant eyedrops more than 3 times a day, overall mild scale for her provider  - 3/25/2022 1 month NIH treatment assessment on PQRST: skin 2, mouth score 1 mild symptoms with NO lichenoid changes, eyes score 2 moderate symptoms w requiring lubricant eyedrops more than 3 times a day, liver score 1 ALT 3.7x ULN and nl bilirubin   - 3/31  Mouth clear; eyes minimal LFT still abn; no joint or new GI sx  - 4/28 Mouth clear, Left>R eye is mild sclera erythema, lids are pink and irritated appearing, LFT's slow improvement, no new joint, skin, or GI symptoms.   -5/11 mouth with mild erythema but no lichenoid changes, eyes using eyedrops 4-6 times a day score of 2, LFTs significantly improved from baseline slight elevation in alk phos and AST with normal bilirubin, skin with hyperpigmentation from old acute GVHD no active skin rashes, no GI symptoms no musculoskeletal complaints.  -5/26 Mouth with very slight lichenoid changes, erythema.  Eyes still using eyedrops 4-6x per day.  LFTs essentially normal with slight elevation in alk phos.  Skin with hyperpigmentation from old acute GVHD, no active rashes, no GI or MSK concerns.  Skin 0, mouth 0 but with lichenoid changes, eyes 2  -6/7/22 Mouth with no lichenoid changes, erythema.  Eyes still using eyedrops 4-6x per day.  LFTs essentially normal with slight elevation in alk phos.  Skin with hyperpigmentation from old acute GVHD, no active rashes, no GI or MSK concerns.  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  Acute lymphoblastic leukemia, [place] chromosome positive in CR, MRD neg. S/p allo sib PBSCT. (ABO matched)  HCT-CI score: 3 (prior solid tumor)  Day +100 bone marrow biopsy is 100% donor with no morphologic or flow cytometric evidence of leukemia BCR abl is pending.  - Day +180 restaging BM bx- still in CR  100% donor;  2/16/22 BCR ABL major breakpoint undetectable.   - 1 year restaging 8/18/2022: Markedly hypocellular bone marrow [less than 10% cellular] with maturing trilineage hematopoiesis and no definite morphologic or immunophenotypic evidence for involvement by B lymphoblastic leukemia. BCRABL1 PCR not detected, bone marrow % donor. FISH NORMAL No evidence of BCR-ABL1 fusion  - Discussed with the patient maintenance with TKI posttransplantation given his Ph positive ALL that have not been started previously presumed secondary to multiple active issues specifically infections and COVID.  We will reconsider this patient will think about whether this is something he would like to consider he was previously on Dasatinib, we would start on a low dose and increase as tolerated.    2.  HEME:  - Transfuse for hgb <7g/dL; plt < 10k.  No transfusions needs  - Pulmonary emboli: He developed a pulmonary emboli in the setting of receiving PEG asparaginase (ie provoked thrombus).  Xarelto complete.   - Counts are tolerating valcyte well.     3.  FEN/Renal:  - Cr improved up to 1.45 today we will get fluids.  - Magnesium 1.6, hypomag secondary to tac. PO magnesium supplementation 3 pills twice daily.  - He has a history of renal cancer and resection. Has a single kidney.    4.    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   #Skin GVHD- history of biopsy proven GVHD of the skin 8/30/2021; resolved.   # Liver cGVHD biopsy proven 2/21/2022: LFTs are overall improving despite pred  taper. Mild flares with transaminitis  # Ocular GVHD: follows with ophthalmology. Maxitrol to lids, continue refreshing drops QID. Score 1-2  Seen 9/20 by Dr. Juarez:  1.) Dry Eye OU  -s/p BMT 08/2021  -Worsening dryness right eye>left eye, symptomatic for photophobia/tearing  -Right eye cornea improved with BCL wear (though not in today), left eye stable without sig stain  -Failed: Restasis/Xiidra (stinging), plugs (scarred puncta)  -Currently on: AT 3-10x/day as needed.   -Doing well on BCL with daily oflox right eye, FML bid OU  -Replaced BCL today. Continue current regimen. Air Optix N&D  -Consider monthly replacement of BCL for now, until eyes have stabilized  2.) Hyperopia/Presbyopia OU  -Previously BCVA 20/20 with correction. Uses low plus readers for distance and higher plus readers for near  -Right eye acuity back to baseline    # Oral GVHD: dex s/s three times a day; tac ointment to lips as needed.   - Cont tac to 1mg BID. previously decided to stay on same dose, no checks of levels if clinically stable, and no need switch to sirolimus. (keep tac low as gvhd is quiet and viremia). 5/10 level 45 with starting of COVID therapy and D-D intractions (Paxlovid for COVID19, which has a drug interaction with tacrolimus-Ritonavir increases serum levels of tacrolimus).   Tacrolimus level subtherapeutic, increase to 2.5 mg twice daily recently, 8/23/2022 instructed to change tacrolimus to 2 mg twice daily when he starts posaconazole and will repeat levels on Monday knows to hold dose. 9/6 with mild NEGRA, hyperkalemia, hypomagnesemia, and reports some tremors and cramps, suspect tacrolimus to be high therefore empirically decreased to 1.5 twice daily, skip morning dose of tacrolimus and took 2 mg today after his afternoon labs. Decrease to 1mg BID on Saturday    3/1-3/16: prednisone 100mg every day  3/17 75mg every day   3/31 75 alt with 50  4/6: 75 alt with 25mg every other day  4/12 pred tapered to 60 alternating  with 25mg   4/15 In setting of viruses trying to reactivate,GVHD stable: Taper 60/15mg alternating.  4/20 decrease pred further to 50/10.    4/25 taper pred to 50/0 every other day as long as symptoms stable.   5/2 Pred decrease 40/0 alternating every other day.   5/13 decrease to 30/0  5/20 decrease to 20/0 then slowly by 5 mg weekly  6/7 decrease to 10/0  Went up to 15/0 with mild increased LFTs  8/23/2022 increased to 20/0, with persistence of oral ulcers and active ocular GVHD.  Discussed with the patient the importance of stopping chewing of nicotine gums for prolonged hours as that would not help with the healing of the oral ulcers.  I discussed with the patient alternatives of nicotine patches that he will reconsider and let me know.  9/6 decreased to 15 alternating with 0  9/13 decreased to 10 alternating with 0  9/21 we will discontinue tacrolimus given persistent NEGRA and single kidney and start the patient on sirolimus without loading dose.  We will get a list of possible side effects and adverse events from the Pharm.D.    5.  ID: Afebrile   #COVID   Positive via homed test 5/8. Treated with Paxlovid with resolution of symptoms.  Had recurrence on 5/18.  Now asymptomatic.  Covid 11/21, 12/21, due for booster will recommend locally but he would like it here     #Pulmonary infiltrates:   4/20 CT chest with new RUL infiltrate. Highly immunocompromised. 4/22 Fungitell/asp GM were neg. BAL 4/29 NGTD, increased micafungin to 150mg IV daily-- f/u 6/1 Dr Garcia CT with mixed results, followed with Dr. Garcia August 2022 with resolution of pulmonary nodules and normalization of LFTs we will switch patient will switch patient to posaconazole prophylactic dose and monitor LFTs and any infectious concerns closely.    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 4/15 started valcyte 900mg PO BID. 4/20 CMV <137, 5/10 ND, decreased to 450mg BID 4/30 - continue 4 weeks as long as CMV <137 till 5/28 + weekly CMV  - Switch to  acyclovir after completion of current therapy 5/28. 8/18 CMV ND. Check monthly on IST    - PPx: ACV, micafungin 150mg daily. Pentamidine given 5/13/2022 did not tolerate atovaqone, overdue we will schedule on 8/29. Azithro 250mg daily (stopped levaquin due to possible tendonitis).   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 S/p 4/22 and 5/4/22 rituximab 375mg/m2 5/10 ND x2, 6/7 ND, 8/18/2022 971, check every other month on IST  S/p covid vaccine series 12/2021  S/p evusheld  Deferred annual vaccines in the setting of GVHD and immunosuppression therapy, discussed with patient    6. Endo: Hx of graves disease; On synthroid 175 mcg daily. TSH normal 4/6/2022 no reflex to T4.     7. CV: Norvasc: 2.5mg, increase to 5mg 9/6    8. GI:   -Omeprazole for heartburn  -LFTs as above, now normal, 9/6 decrease ursodiol to daily, monitor, posaconazole level from August 2022 within normal.  Will defer starting statins for hyperlipidemia given recent transaminitis and start of posaconazole, LFTs now within normal with the start of sirolimus will recheck couple weeks after start and initiate appropriate therapy.  Discussed with the patient.    9. Psych:   - Situational anxiety - lexapro 10mg daily.   - Insomnia: worse on steroids. Ativan, trazadone, melatonin    10. MSK: left food drop, PT. Occasional muscle cramps discussed optimizing vitamin D levels and considering vitamin D, some of the symptomatology of muscle cramps are likely related to chronic GVHD.    11. HCM/Age appropriate cancer screening:  -Discussed with the patient importance of age-appropriate cancer screening including colonoscopies, he is due for 1.  -Discussed importance of at least once a year vitamin D level check, 8/2020 236 low normal will start calcium and vitamin D replacement and recheck levels in 2 to 3 months  -Screening for secondary iron overload, 8/2022 ferritin 1023 we will recheck in 2-3 months  -Needs yearly thyroid check and lipid  profile check, 8/2022 cholesterol 290 elevated, triglycerides 146 and LDL elevated at 162 discussed with the patient he was on statins prior to transplantation that needs to be restarted [place] will hold off given start of posaconazole and recent transaminitis and LFT abnormalities related to GVHD see GI section above  -9/6/2022 DEXA scan Based on BMD diagnosis is consistent with normal bone density based on WHO criteria Ref. 1   -PFTs 9/20   -Metabolic other, 8/2022 testosterone within normal    Plan:    - Mag 3 pills/3 pills  - Vitamin E   - Stop tacro, start sirolimus 1mg daily today 9/21  - Continue prednisone to 10/0mg  - Monday labs, will call  - 1 L of normal saline 9/21 encourage hydration  - 10/11 follow up with me, to check CMV and EBV levels, lipid profile in the near future.       I spent 40 minutes in the care of this patient today, which included time necessary for preparation for the visit, obtaining history, ordering medications/tests/procedures as medically indicated, review of pertinent medical literature, counseling of the patient, communication of recommendations to the care team, and documentation time.        Again, thank you for allowing me to participate in the care of your patient.      Sincerely,    BMT DOM

## 2022-09-20 NOTE — NURSING NOTE
"Oncology Rooming Note    September 20, 2022 2:43 PM   Nik Nava is a 62 year old male who presents for:    Chief Complaint   Patient presents with     Oncology Clinic Visit     ALL in remission     Initial Vitals: /79   Pulse 84   Temp 97.9  F (36.6  C) (Oral)   Resp 16   Wt 110.7 kg (244 lb)   SpO2 97%   BMI 32.19 kg/m   Estimated body mass index is 32.19 kg/m  as calculated from the following:    Height as of 8/18/22: 1.854 m (6' 1\").    Weight as of this encounter: 110.7 kg (244 lb). Body surface area is 2.39 meters squared.  Moderate Pain (4) Comment: Data Unavailable   No LMP for male patient.  Allergies reviewed: Yes  Medications reviewed: Yes    Medications: Medication refills not needed today.  Pharmacy name entered into [facility]:    [facility] - [place], [place] - [address] [facility] [address] [facility] [place], [place] - [id number]    Clinical concerns: none       Yamilet Marley CMA            "

## 2022-09-20 NOTE — PROGRESS NOTES
[facility] Dermatology Note  Encounter Date: Sep 21, 2022  Office Visit     Dermatology Problem List:  1. 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 Discussed increased risk of skin cancers in patients with a history of allogeneic stem cell transplant due to iatrogenic immunosuppression.   - ABCDs of melanoma were discussed and self skin checks were advised.  - Sun precaution was advised including the use of sun screens of SPF 30 or higher, sun protective clothing, and avoidance of tanning beds.    # GVHD with skin, oral cavity involvement. Chronic, flare.   Has been managed with prednisone taper (current 10mg every other day) and PRN triamcinolone use. Occular component followed by Opthalmology. Today, mostly post-inflammatory hyperpigmentation with mild erythema and red papules forming on the mid back.  - Clobetasol 0.05% gel BID PRN for oral cavity lesions, apply up to BID  - Decrease gum use if possible to help with healing  - Try CeraVe or Vanicream for dry areas    # Clustered, red papules and vesicle, R medial knee  Patient has hx of shingles, however these current lesions are not symptomatic and appeared within the week. One is vesicular.  - Swabbed contents of vesicle, HSV and VZV PCR ordered     #Benign lesions: Multiple benign nevi, solar lentigos, seborrheic keratoses, cherry angiomas. Explained to patient benign nature of lesion. No treatment is necessary at this time unless the lesion changes or becomes symptomatic.   - ABCDs of melanoma were discussed and self skin checks were advised.  - Sun precaution was advised including the use of sun screens of SPF 30 or higher, sun protective clothing, and  avoidance of tanning beds.    Procedures Performed:   - None.     Follow-up: 1 year(s) in-person, or earlier for new or changing lesions    Staff, Medical Stuent and Scribe:     Matthew Ramos, MS3  [employer] [facility] [employer] [employer] [employer]    Staff Physician:  I was present with the medical student who participated in the service and in the documentation of the note. I have verified the history and personally performed the physical exam and medical decision making. I agree with the assessment and plan of care as documented in the note.     Provider Disclosure:   The documentation recorded by the scribe accurately reflects the services I personally performed and the decisions made by me.    Jin Gaffney MD    Department of Dermatology  [facility]: Phone: [phone], Fax:[phone]  [facility]: Phone: [phone] Fax: [phone]  ____________________________________________    CC: Skin Check (Nik is here for a skin check and has no spots of concern.)    HPI:  Mr. Nik Nava is a(n) 62 year old male who presents today as a new patient for a skin check, s/p BMT for ALL 8/10/21. He has no history of skin cancer and no family hx. Tacrolimus 1mg for immunosuppression.    Has been dealing with GVHD since dec/juani with skin involvement. It has been predominantly on his back but also on the face, arms, chest, lower abdomen. There was also involvement of his R eye and oral cavity. The majority of these spots have been managed on prednisone taper, currently 10mg every other day. The lesions on his cheeks have been slow to heal, associated with nicorette gum use.    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 He likes to spend time outside, wearing a hat but no sunscreen use.     Notes some clustered red papules on his L medial knee, one of which appears to be vesicular. They are  not painful, itchy or otherwise symptomatic. He has had shingles in the past, not received shingrex vaccine.     Patient is otherwise feeling well, without additional skin concerns.    Labs Reviewed:  N/A    Physical Exam:  Vitals: There were no vitals taken for this visit.  SKIN: Full skin, which includes the head/face, both arms, chest, back, abdomen,both legs, genitalia and/or groin buttocks, digits and/or nails, was examined.  - There are dome shaped bright red papules on the trunk and extremities.   - There are waxy stuck on tan to brown papules on the trunk and extremities.   - Post inflammatory hyperpigmentation on trunk and upper extremities  - Mild erythema with pink papules mid back  - Scattered ecchymosis r/t procedures on forearms   - Clustered red papules on the R medial knee, one appears to be vesicular  - There is xerosis of the back and extremities.   - No other lesions of concern on areas examined.     Medications:  Current Outpatient Medications   Medication     acyclovir (ZOVIRAX) 800 MG tablet     amLODIPine (NORVASC) 5 MG tablet     azithromycin (ZITHROMAX) 250 MG tablet     Carboxymethylcell-Glycerin PF (REFRESH RELIEVA PF) 0.5-1 % SOLN     clobetasol (TEMOVATE) 0.05 % external ointment     dexamethasone alcohol-free (DECADRON) 0.1 MG/ML solution     fluorometholone (FML LIQUIFILM) 0.1 % ophthalmic suspension     fluorometholone (FML LIQUIFILM) 0.1 % ophthalmic suspension     levothyroxine (SYNTHROID/LEVOTHROID) 175 MCG tablet     LORazepam (ATIVAN) 1 MG tablet     magnesium oxide (MAG-OX) 400 MG tablet     nicotine polacrilex (NICORETTE) 4 MG gum     ofloxacin (OCUFLOX) 0.3 % ophthalmic solution     omeprazole (PRILOSEC) 40 MG DR capsule     posaconazole (NOXAFIL) 100 MG EC tablet     predniSONE (DELTASONE) 10 MG tablet     sennosides (SENOKOT) 8.6 MG tablet     traZODone (DESYREL) 50 MG tablet     melatonin 5 MG CAPS     sirolimus (GENERIC EQUIVALENT) 1 MG tablet     tacrolimus (PROTOPIC) 0.1  % external ointment     No current facility-administered medications for this visit.     Facility-Administered Medications Ordered in Other Visits   Medication     heparin 100 UNIT/ML injection 5 mL      Past Medical History:   Patient Active Problem List   Diagnosis     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)     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)     Status post bone marrow transplant (H)     Mus[place]-Barr virus viremia     Generalized muscle weakness     Past Medical History:   Diagnosis Date     Cancer (H)     renal cell     CKD (chronic kidney disease)      Thyroid disease         CC Paulina Silva, APRN [facility]  [address] [address] on close of this encounter.

## 2022-09-20 NOTE — PROGRESS NOTES
BCL replacement only.     A/P  1.) Dry Eye OU  -s/p BMT 08/2021  -Worsening dryness right eye>left eye, symptomatic for photophobia/tearing  -Right eye cornea improved with BCL wear (though not in today), left eye stable without sig stain  -Failed: Restasis/Xiidra (stinging), plugs (scarred puncta)  -Currently on: AT 3-10x/day as needed.   -Doing well on BCL with daily oflox right eye, FML bid OU  -Replaced BCL today. Continue current regimen. Air Optix N&D  -Consider monthly replacement of BCL for now, until eyes have stabilized    2.) Hyperopia/Presbyopia OU  -Previously BCVA 20/20 with correction. Uses low plus readers for distance and higher plus readers for near  -Right eye acuity back to baseline    RTC 1 month recheck, sooner prn    I have confirmed the patient's CC, HPI and reviewed Past Medical History, Past Surgical History, Social History, Family History, Problem List, Medication List and agree with Tech note.     Aisha Juarez, ADILENE MONIQUEO SHARDAS

## 2022-09-21 ENCOUNTER — INFUSION THERAPY VISIT (OUTPATIENT)
Dept: TRANSPLANT | Facility: CLINIC | Age: 62
End: 2022-09-21
Attending: INTERNAL MEDICINE
Payer: COMMERCIAL

## 2022-09-21 ENCOUNTER — OFFICE VISIT (OUTPATIENT)
Dept: DERMATOLOGY | Facility: CLINIC | Age: 62
End: 2022-09-21
Payer: COMMERCIAL

## 2022-09-21 VITALS
DIASTOLIC BLOOD PRESSURE: 80 MMHG | OXYGEN SATURATION: 98 % | TEMPERATURE: 98 F | HEART RATE: 80 BPM | RESPIRATION RATE: 18 BRPM | SYSTOLIC BLOOD PRESSURE: 135 MMHG

## 2022-09-21 DIAGNOSIS — D89.813 GVHD (GRAFT VERSUS HOST DISEASE) (H): Primary | ICD-10-CM

## 2022-09-21 DIAGNOSIS — C91.00 ALL (ACUTE LYMPHOBLASTIC LEUKEMIA) (H): Primary | ICD-10-CM

## 2022-09-21 DIAGNOSIS — Z94.84 HISTORY OF PERIPHERAL STEM CELL TRANSPLANT (H): ICD-10-CM

## 2022-09-21 DIAGNOSIS — L82.1 SEBORRHEIC KERATOSIS: ICD-10-CM

## 2022-09-21 DIAGNOSIS — D22.9 MULTIPLE BENIGN NEVI: ICD-10-CM

## 2022-09-21 DIAGNOSIS — L81.4 SOLAR LENTIGO: ICD-10-CM

## 2022-09-21 DIAGNOSIS — L30.9 DERMATITIS: ICD-10-CM

## 2022-09-21 DIAGNOSIS — D18.01 CHERRY ANGIOMA: ICD-10-CM

## 2022-09-21 LAB
CMV DNA SPEC NAA+PROBE-ACNC: NOT DETECTED IU/ML
DLCOCOR-%PRED-PRE: 142 %
DLCOCOR-PRE: 42.05 ML/MIN/MMHG
DLCOUNC-%PRED-PRE: 134 %
DLCOUNC-PRE: 39.75 ML/MIN/MMHG
DLCOUNC-PRED: 29.48 ML/MIN/MMHG
ERV-%PRED-PRE: 170 %
ERV-PRE: 1.9 L
ERV-PRED: 1.12 L
EXPTIME-PRE: 9.84 SEC
FEF2575-%PRED-PRE: 75 %
FEF2575-PRE: 2.23 L/SEC
FEF2575-PRED: 2.97 L/SEC
FEFMAX-%PRED-PRE: 111 %
FEFMAX-PRE: 10.67 L/SEC
FEFMAX-PRED: 9.6 L/SEC
FEV1-%PRED-PRE: 99 %
FEV1-PRE: 3.74 L
FEV1FEV6-PRE: 73 %
FEV1FEV6-PRED: 78 %
FEV1FVC-PRE: 70 %
FEV1FVC-PRED: 76 %
FEV1SVC-PRE: 68 %
FEV1SVC-PRED: 70 %
FIFMAX-PRE: 7.68 L/SEC
FRCPLETH-%PRED-PRE: 125 %
FRCPLETH-PRE: 4.82 L
FRCPLETH-PRED: 3.82 L
FVC-%PRED-PRE: 108 %
FVC-PRE: 5.36 L
FVC-PRED: 4.96 L
IC-%PRED-PRE: 83 %
IC-PRE: 3.58 L
IC-PRED: 4.31 L
RVPLETH-%PRED-PRE: 111 %
RVPLETH-PRE: 2.91 L
RVPLETH-PRED: 2.61 L
TLCPLETH-%PRED-PRE: 108 %
TLCPLETH-PRE: 8.4 L
TLCPLETH-PRED: 7.74 L
VA-%PRED-PRE: 111 %
VA-PRE: 8.04 L
VC-%PRED-PRE: 101 %
VC-PRE: 5.48 L
VC-PRED: 5.42 L

## 2022-09-21 PROCEDURE — 87798 DETECT AGENT NOS DNA AMP: CPT | Performed by: PATHOLOGY

## 2022-09-21 PROCEDURE — 87529 HSV DNA AMP PROBE: CPT | Performed by: PATHOLOGY

## 2022-09-21 PROCEDURE — 99000 SPECIMEN HANDLING OFFICE-LAB: CPT | Performed by: PATHOLOGY

## 2022-09-21 PROCEDURE — 99204 OFFICE O/P NEW MOD 45 MIN: CPT | Performed by: DERMATOLOGY

## 2022-09-21 PROCEDURE — 96360 HYDRATION IV INFUSION INIT: CPT

## 2022-09-21 PROCEDURE — 258N000003 HC RX IP 258 OP 636: Performed by: INTERNAL MEDICINE

## 2022-09-21 RX ORDER — HEPARIN SODIUM (PORCINE) LOCK FLUSH IV SOLN 100 UNIT/ML 100 UNIT/ML
5 SOLUTION INTRAVENOUS ONCE
Status: DISCONTINUED | OUTPATIENT
Start: 2022-09-21 | End: 2022-09-21 | Stop reason: HOSPADM

## 2022-09-21 RX ORDER — SIROLIMUS 1 MG/1
1 TABLET, FILM COATED ORAL DAILY
Qty: 60 TABLET | Refills: 3 | Status: SHIPPED | OUTPATIENT
Start: 2022-09-21 | End: 2022-11-01

## 2022-09-21 RX ORDER — CLOBETASOL PROPIONATE 0.5 MG/G
OINTMENT TOPICAL
Qty: 30 G | Refills: 3 | Status: SHIPPED | OUTPATIENT
Start: 2022-09-21 | End: 2023-02-23

## 2022-09-21 RX ADMIN — SODIUM CHLORIDE 1000 ML: 9 INJECTION, SOLUTION INTRAVENOUS at 09:48

## 2022-09-21 ASSESSMENT — PAIN SCALES - GENERAL
PAINLEVEL: NO PAIN (0)
PAINLEVEL: NO PAIN (0)

## 2022-09-21 NOTE — LETTER
9/21/2022       RE: Nik Nava  [address] [place] [address]     Dear Colleague,    Thank you for referring your patient, Nik Nava, to the [facility] DERMATOLOGY [facility] [place] [facility]. Please see a copy of my visit note below.    [facility] Dermatology Note  Encounter Date: Sep 21, 2022  Office Visit     Dermatology Problem List:  1. 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 Discussed increased risk of skin cancers in patients with a history of allogeneic stem cell transplant due to iatrogenic immunosuppression.   - ABCDs of melanoma were discussed and self skin checks were advised.  - Sun precaution was advised including the use of sun screens of SPF 30 or higher, sun protective clothing, and avoidance of tanning beds.    # GVHD with skin, oral cavity involvement. Chronic, flare.   Has been managed with prednisone taper (current 10mg every other day) and PRN triamcinolone use. Occular component followed by Opthalmology. Today, mostly post-inflammatory hyperpigmentation with mild erythema and red papules forming on the mid back.  - Clobetasol 0.05% gel BID PRN for oral cavity lesions, apply up to BID  - Decrease gum use if possible to help with healing  - Try CeraVe or Vanicream for dry areas    # Clustered, red papules and vesicle, R medial knee  Patient has hx of shingles, however these current lesions are not symptomatic and appeared within the week. One is vesicular.  - Swabbed contents of vesicle, HSV and VZV PCR ordered     #Benign lesions: Multiple benign nevi, solar lentigos, seborrheic keratoses, cherry angiomas.  Explained to patient benign nature of lesion. No treatment is necessary at this time unless the lesion changes or becomes symptomatic.   - ABCDs of melanoma were discussed and self skin checks were advised.  - Sun precaution was advised including the use of sun screens of SPF 30 or higher, sun protective clothing, and avoidance of tanning beds.    Procedures Performed:   - None.     Follow-up: 1 year(s) in-person, or earlier for new or changing lesions    Staff, Medical Stuent and Scribe:     Matthew Ramos, MS3  [employer]    Staff Physician:  I was present with the medical student who participated in the service and in the documentation of the note. I have verified the history and personally performed the physical exam and medical decision making. I agree with the assessment and plan of care as documented in the note.     Provider Disclosure:   The documentation recorded by the scribe accurately reflects the services I personally performed and the decisions made by me.    Jin Gaffney MD    Department of Dermatology  [facility]: Phone: [phone], Fax:[phone]  [facility]: Phone: [phone] Fax: [phone]  ____________________________________________    CC: Skin Check (Nik is here for a skin check and has no spots of concern.)    HPI:  Mr. Nik Nava is a(n) 62 year old male who presents today as a new patient for a skin check, s/p BMT for ALL 8/10/21. He has no history of skin cancer and no family hx. Tacrolimus 1mg for immunosuppression.    Has been dealing with GVHD since dec/juani with skin involvement. It has been predominantly on his back but also on the face, arms, chest, lower abdomen. There was also involvement of his R eye and oral cavity. The majority of these spots have been managed on prednisone taper, currently 10mg every other day.  The lesions on his cheeks have been slow to heal, associated with nicorette gum use.    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 He likes to spend time outside, wearing a hat but no sunscreen use.     Notes some clustered red papules on his L medial knee, one of which appears to be vesicular. They are not painful, itchy or otherwise symptomatic. He has had shingles in the past, not received shingrex vaccine.     Patient is otherwise feeling well, without additional skin concerns.    Labs Reviewed:  N/A    Physical Exam:  Vitals: There were no vitals taken for this visit.  SKIN: Full skin, which includes the head/face, both arms, chest, back, abdomen,both legs, genitalia and/or groin buttocks, digits and/or nails, was examined.  - There are dome shaped bright red papules on the trunk and extremities.   - There are waxy stuck on tan to brown papules on the trunk and extremities.   - Post inflammatory hyperpigmentation on trunk and upper extremities  - Mild erythema with pink papules mid back  - Scattered ecchymosis r/t procedures on forearms   - Clustered red papules on the R medial knee, one appears to be vesicular  - There is xerosis of the back and extremities.   - No other lesions of concern on areas examined.     Medications:  Current Outpatient Medications   Medication     acyclovir (ZOVIRAX) 800 MG tablet     amLODIPine (NORVASC) 5 MG tablet     azithromycin (ZITHROMAX) 250 MG tablet     Carboxymethylcell-Glycerin PF (REFRESH RELIEVA PF) 0.5-1 % SOLN     clobetasol (TEMOVATE) 0.05 % external ointment     dexamethasone alcohol-free (DECADRON) 0.1 MG/ML solution     fluorometholone (FML LIQUIFILM) 0.1 % ophthalmic suspension     fluorometholone (FML LIQUIFILM) 0.1 % ophthalmic suspension     levothyroxine (SYNTHROID/LEVOTHROID) 175 MCG tablet     LORazepam (ATIVAN) 1 MG tablet     magnesium oxide (MAG-OX) 400 MG tablet     nicotine polacrilex (NICORETTE) 4 MG gum     ofloxacin (OCUFLOX) 0.3 %  ophthalmic solution     omeprazole (PRILOSEC) 40 MG DR capsule     posaconazole (NOXAFIL) 100 MG EC tablet     predniSONE (DELTASONE) 10 MG tablet     sennosides (SENOKOT) 8.6 MG tablet     traZODone (DESYREL) 50 MG tablet     melatonin 5 MG CAPS     sirolimus (GENERIC EQUIVALENT) 1 MG tablet     tacrolimus (PROTOPIC) 0.1 % external ointment     No current facility-administered medications for this visit.     Facility-Administered Medications Ordered in Other Visits   Medication     heparin 100 UNIT/ML injection 5 mL      Past Medical History:   Patient Active Problem List   Diagnosis     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)     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)     Status post bone marrow transplant (H)     Musa-Barr virus viremia     Generalized muscle weakness     Past Medical History:   Diagnosis Date     Cancer (H)     renal cell     CKD (chronic kidney disease)      Thyroid disease         CC Paulina Silva, PEE CNP  [address]  [place] [address] on close of this encounter.

## 2022-09-21 NOTE — NURSING NOTE
Dermatology Rooming Note    Nik Nava's goals for this visit include:   Chief Complaint   Patient presents with     Skin Check     Nik is here for a skin check and has no spots of concern.     Omari Beltre, EMT

## 2022-09-21 NOTE — LETTER
9/21/2022         RE: Nik Nava  [id number]  [place] [address]        Dear Colleague,    Thank you for referring your patient, Nik Nava, to the [facility]LOOD [facility] MARROW [facility]ANSPLANT PRO[facility] [place]. Please see a copy of my visit note below.    Infusion Nursing Note:  Nik Nava presents today for scheduled infusion.    Patient seen by provider today: No   present during visit today: Not Applicable.    Note: Patient received 1 L NS bolus per therapy plan.    Intravenous Access:  Implanted Port.    Treatment Conditions:  Results reviewed, labs MET treatment parameters, ok to proceed with treatment.    Post Infusion Assessment:  Patient tolerated infusion without incident.     Discharge Plan:   Patient and/or family verbalized understanding of discharge instructions and all questions answered.      Rima Simon RN                          Again, thank you for allowing me to participate in the care of your patient.        Sincerely,        [facility]

## 2022-09-21 NOTE — PROGRESS NOTES
Infusion Nursing Note:  Nik Nava presents today for scheduled infusion.    Patient seen by provider today: No   present during visit today: Not Applicable.    Note: Patient received 1 L NS bolus per therapy plan.    Intravenous Access:  Implanted Port.    Treatment Conditions:  Results reviewed, labs MET treatment parameters, ok to proceed with treatment.    Post Infusion Assessment:  Patient tolerated infusion without incident.     Discharge Plan:   Patient and/or family verbalized understanding of discharge instructions and all questions answered.      Rima Simon RN

## 2022-09-22 LAB
HSV1 DNA SPEC QL NAA+PROBE: NOT DETECTED
HSV2 DNA SPEC QL NAA+PROBE: NOT DETECTED
VZV DNA SPEC QL NAA+PROBE: NOT DETECTED

## 2022-09-22 NOTE — PROGRESS NOTES
This is a recent snapshot of the patient's [facility] medical record.  For current drug dose and complete information and questions, call [phone] or In Basket pool, [facility]v home infusion ([id number])  CSN Number:  [id number]

## 2022-09-26 ENCOUNTER — LAB (OUTPATIENT)
Dept: LAB | Facility: CLINIC | Age: 62
End: 2022-09-26
Attending: INTERNAL MEDICINE
Payer: COMMERCIAL

## 2022-09-26 DIAGNOSIS — T86.09 GVHD AS COMPLICATION OF BONE MARROW TRANSPLANT (H): ICD-10-CM

## 2022-09-26 DIAGNOSIS — D89.813 GVHD AS COMPLICATION OF BONE MARROW TRANSPLANT (H): ICD-10-CM

## 2022-09-26 LAB
ALBUMIN SERPL BCG-MCNC: 4 G/DL (ref 3.5–5.2)
ALP SERPL-CCNC: 113 U/L (ref 40–129)
ALT SERPL W P-5'-P-CCNC: 32 U/L (ref 10–50)
ANION GAP SERPL CALCULATED.3IONS-SCNC: 12 MMOL/L (ref 7–15)
AST SERPL W P-5'-P-CCNC: 46 U/L (ref 10–50)
BASOPHILS # BLD AUTO: 0 10E3/UL (ref 0–0.2)
BASOPHILS NFR BLD AUTO: 0 %
BILIRUB SERPL-MCNC: 0.8 MG/DL
BUN SERPL-MCNC: 46.5 MG/DL (ref 8–23)
CALCIUM SERPL-MCNC: 10.2 MG/DL (ref 8.8–10.2)
CHLORIDE SERPL-SCNC: 101 MMOL/L (ref 98–107)
CREAT SERPL-MCNC: 1.23 MG/DL (ref 0.67–1.17)
DEPRECATED HCO3 PLAS-SCNC: 23 MMOL/L (ref 22–29)
EOSINOPHIL # BLD AUTO: 0 10E3/UL (ref 0–0.7)
EOSINOPHIL NFR BLD AUTO: 0 %
ERYTHROCYTE [DISTWIDTH] IN BLOOD BY AUTOMATED COUNT: 15.4 % (ref 10–15)
GFR SERPL CREATININE-BSD FRML MDRD: 66 ML/MIN/1.73M2
GLUCOSE SERPL-MCNC: 125 MG/DL (ref 70–99)
HCT VFR BLD AUTO: 37.1 % (ref 40–53)
HGB BLD-MCNC: 13.3 G/DL (ref 13.3–17.7)
IMM GRANULOCYTES # BLD: 0.2 10E3/UL
IMM GRANULOCYTES NFR BLD: 2 %
LDH SERPL L TO P-CCNC: NORMAL U/L
LYMPHOCYTES # BLD AUTO: 2.3 10E3/UL (ref 0.8–5.3)
LYMPHOCYTES NFR BLD AUTO: 27 %
MAGNESIUM SERPL-MCNC: 1.9 MG/DL (ref 1.7–2.3)
MCH RBC QN AUTO: 35.3 PG (ref 26.5–33)
MCHC RBC AUTO-ENTMCNC: 35.8 G/DL (ref 31.5–36.5)
MCV RBC AUTO: 98 FL (ref 78–100)
MONOCYTES # BLD AUTO: 1 10E3/UL (ref 0–1.3)
MONOCYTES NFR BLD AUTO: 12 %
NEUTROPHILS # BLD AUTO: 4.8 10E3/UL (ref 1.6–8.3)
NEUTROPHILS NFR BLD AUTO: 59 %
NRBC # BLD AUTO: 0 10E3/UL
NRBC BLD AUTO-RTO: 0 /100
PLATELET # BLD AUTO: 153 10E3/UL (ref 150–450)
POTASSIUM SERPL-SCNC: 5.2 MMOL/L (ref 3.4–5.3)
PROT SERPL-MCNC: 6.8 G/DL (ref 6.4–8.3)
RBC # BLD AUTO: 3.77 10E6/UL (ref 4.4–5.9)
RETICS # AUTO: 0.06 10E6/UL (ref 0.03–0.1)
RETICS/RBC NFR AUTO: 1.7 % (ref 0.5–2)
SIROLIMUS BLD-MCNC: 6.8 UG/L (ref 5–15)
SODIUM SERPL-SCNC: 136 MMOL/L (ref 136–145)
TACROLIMUS BLD-MCNC: <1 UG/L (ref 5–15)
TME LAST DOSE: ABNORMAL H
TME LAST DOSE: ABNORMAL H
TME LAST DOSE: NORMAL H
TME LAST DOSE: NORMAL H
WBC # BLD AUTO: 8.3 10E3/UL (ref 4–11)

## 2022-09-26 PROCEDURE — 250N000011 HC RX IP 250 OP 636: Performed by: INTERNAL MEDICINE

## 2022-09-26 PROCEDURE — 85045 AUTOMATED RETICULOCYTE COUNT: CPT

## 2022-09-26 PROCEDURE — 83735 ASSAY OF MAGNESIUM: CPT

## 2022-09-26 PROCEDURE — 85025 COMPLETE CBC W/AUTO DIFF WBC: CPT

## 2022-09-26 PROCEDURE — 85060 BLOOD SMEAR INTERPRETATION: CPT | Performed by: PATHOLOGY

## 2022-09-26 PROCEDURE — 83615 LACTATE (LD) (LDH) ENZYME: CPT

## 2022-09-26 PROCEDURE — 80195 ASSAY OF SIROLIMUS: CPT

## 2022-09-26 PROCEDURE — 80053 COMPREHEN METABOLIC PANEL: CPT

## 2022-09-26 PROCEDURE — 80197 ASSAY OF TACROLIMUS: CPT

## 2022-09-26 RX ORDER — HEPARIN SODIUM (PORCINE) LOCK FLUSH IV SOLN 100 UNIT/ML 100 UNIT/ML
5 SOLUTION INTRAVENOUS ONCE
Status: COMPLETED | OUTPATIENT
Start: 2022-09-26 | End: 2022-09-26

## 2022-09-26 RX ADMIN — Medication 5 ML: at 07:55

## 2022-09-26 NOTE — NURSING NOTE
Chief Complaint   Patient presents with     Port Draw     Labs drawn via port by RN in lab.      Port accessed with 20 gauge 3/4 inch flat needle and labs drawn by RN.  Port flushed with saline and heparin.  Pt tolerated well.    Tamie Pruett RN

## 2022-09-27 ENCOUNTER — TELEPHONE (OUTPATIENT)
Dept: OPTOMETRY | Facility: CLINIC | Age: 62
End: 2022-09-27

## 2022-09-27 LAB
PATH REPORT.COMMENTS IMP SPEC: NORMAL
PATH REPORT.COMMENTS IMP SPEC: NORMAL
PATH REPORT.FINAL DX SPEC: NORMAL
PATH REPORT.MICROSCOPIC SPEC OTHER STN: NORMAL
PATH REPORT.MICROSCOPIC SPEC OTHER STN: NORMAL
PATH REPORT.RELEVANT HX SPEC: NORMAL

## 2022-09-27 NOTE — TELEPHONE ENCOUNTER
[facility] Call Center    Phone Message    May a detailed message be left on voicemail: yes     Reason for Call: Other: Lens fell out of left eye, patient would like to know if he should reinsert it and how, or if he needs to be seen.  Please call him or his wife Lamar    Action Taken: Message routed to:  [facility] ([facility] Ophthalmology    Travel Screening: Not Applicable                                                                    .

## 2022-09-28 ENCOUNTER — TELEPHONE (OUTPATIENT)
Dept: OPHTHALMOLOGY | Facility: CLINIC | Age: 62
End: 2022-09-28

## 2022-09-28 DIAGNOSIS — D89.813 GVHD AS COMPLICATION OF BONE MARROW TRANSPLANT (H): ICD-10-CM

## 2022-09-28 DIAGNOSIS — D89.813 GVHD AS COMPLICATION OF BONE MARROW TRANSPLANT (H): Primary | ICD-10-CM

## 2022-09-28 DIAGNOSIS — T86.09 GVHD AS COMPLICATION OF BONE MARROW TRANSPLANT (H): Primary | ICD-10-CM

## 2022-09-28 DIAGNOSIS — T86.09 GVHD AS COMPLICATION OF BONE MARROW TRANSPLANT (H): ICD-10-CM

## 2022-09-28 RX ORDER — AZITHROMYCIN 250 MG/1
250 TABLET, FILM COATED ORAL DAILY
Qty: 30 TABLET | Refills: 3 | Status: SHIPPED | OUTPATIENT
Start: 2022-09-28 | End: 2023-02-08

## 2022-09-28 NOTE — TELEPHONE ENCOUNTER
Called and spoke to Nik     Made him an appointment with Dr. Juarez for 9/29 @ 840 am @ the [facility] location- ok per Dr. ALEX [facility] Communication Facilitator on 9/28/2022 at 8:09 AM

## 2022-09-28 NOTE — TELEPHONE ENCOUNTER
LVM for patient regarding appointment on 9/29/22 has been changed to 9am with  at same location.. Provided direct number for any questions.

## 2022-09-29 ENCOUNTER — OFFICE VISIT (OUTPATIENT)
Dept: OPHTHALMOLOGY | Facility: CLINIC | Age: 62
End: 2022-09-29
Payer: COMMERCIAL

## 2022-09-29 DIAGNOSIS — H04.129 DRY EYE: ICD-10-CM

## 2022-09-29 DIAGNOSIS — D89.813 GVHD AS COMPLICATION OF BONE MARROW TRANSPLANT (H): Primary | ICD-10-CM

## 2022-09-29 DIAGNOSIS — T86.09 GVHD AS COMPLICATION OF BONE MARROW TRANSPLANT (H): Primary | ICD-10-CM

## 2022-09-29 PROCEDURE — 99213 OFFICE O/P EST LOW 20 MIN: CPT | Performed by: OPTOMETRIST

## 2022-09-29 ASSESSMENT — REFRACTION_WEARINGRX
OS_CYLINDER: SPHERE
OD_SPHERE: +3.00
OS_SPHERE: +3.00
OD_CYLINDER: SPHERE

## 2022-09-29 ASSESSMENT — EXTERNAL EXAM - RIGHT EYE: OD_EXAM: DERMATOCHALASIS

## 2022-09-29 ASSESSMENT — VISUAL ACUITY
CORRECTION_TYPE: GLASSES
OS_PH_SC+: -2
OS_PH_SC: 20/20
OS_SC+: -2
METHOD: SNELLEN - LINEAR
OS_SC: 20/40
OD_PH_SC: 20/25
OD_PH_SC+: -3
OD_SC: 20/70

## 2022-09-29 ASSESSMENT — SLIT LAMP EXAM - LIDS
COMMENTS: 1+ BLEPH, THICKENED MARGINS, 1-2+ MGD
COMMENTS: 1+ BLEPH, THICKENED MARGINS, 1-2+ MGD

## 2022-09-29 ASSESSMENT — CONF VISUAL FIELD
METHOD: COUNTING FINGERS
OS_NORMAL: 1
OD_NORMAL: 1

## 2022-09-29 ASSESSMENT — REFRACTION_CURRENTRX
OD_SPHERE: -0.25
OD_BRAND: AIR OPTIX N&D
OD_DIAMETER: 13.8
OD_BASECURVE: 8.6

## 2022-09-29 ASSESSMENT — TONOMETRY
IOP_METHOD: ICARE
OS_IOP_MMHG: 13
OD_IOP_MMHG: 14

## 2022-09-29 ASSESSMENT — EXTERNAL EXAM - LEFT EYE: OS_EXAM: DERMATOCHALASIS

## 2022-09-29 NOTE — PROGRESS NOTES
History  HPI     Follow Up      Additional comments: BCL change               Comments     Patient states that the bandage contact lens fell out two days ago.  Patient was instructed to bring it with him. He states that he has the drop in water. He states that this bandage contact lens was not as comfortable as the first one. Patient states that his vision is the same.     Ocular Meds: refresh tears 8-9 times a day recently.     Kelvin Gutiérrez COT, September 29, 2022 8:53 AM                Last edited by Kelvin Gutiérrez on 9/29/2022  9:00 AM. (History)          Assessment/Plan  (T86.09,  D89.813) 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)  (primary encounter diagnosis)  (H04.129) Dry eye  Comment: Bandage lens ([employer] N&D) reinserted  Plan:  Educated patient on clinical findings. Continue use of ofloxacin, increased to twice each day due to corneal abrasion. If lens falls out, do not replace. Call clinic and return for follow-up. Continue use of antibiotic drop if lens falls out. Will consider different bandage lens if this brand falls out again.    Return to clinic in 3 weeks with Dr. Juarez for follow-up.    Complete documentation of historical and exam elements from today's encounter can  be found in the full encounter summary report (not reduplicated in this progress  note). I personally obtained the chief complaint(s) and history of present illness. I  confirmed and edited as necessary the review of systems, past medical/surgical  history, family history, social history, and examination findings as documented by  others; and I examined the patient myself. I personally reviewed the relevant tests,  images, and reports as documented above. I formulated and edited as necessary the  assessment and plan and discussed the findings and management plan with the  patient and family.    Haseeb Caicedo, OD, FAAO  
Patient is at baseline

## 2022-09-29 NOTE — NURSING NOTE
Chief Complaints and History of Present Illnesses   Patient presents with     Follow Up     BCL change      Chief Complaint(s) and History of Present Illness(es)     Follow Up     Comments: BCL change               Comments     Patient states that the bandage contact lens fell out two days ago.  Patient was instructed to bring it with him. He states that he has the drop in water. He states that this bandage contact lens was not as comfortable as the first one. Patient states that his vision is the same.     Ocular Meds: refresh tears 8-9 times a day recently.     Kelvin TEIXEIRA, September 29, 2022 8:53 AM

## 2022-10-03 ENCOUNTER — LAB (OUTPATIENT)
Dept: LAB | Facility: CLINIC | Age: 62
End: 2022-10-03
Payer: COMMERCIAL

## 2022-10-03 DIAGNOSIS — D89.813 GVHD AS COMPLICATION OF BONE MARROW TRANSPLANT (H): ICD-10-CM

## 2022-10-03 DIAGNOSIS — T86.09 GVHD AS COMPLICATION OF BONE MARROW TRANSPLANT (H): ICD-10-CM

## 2022-10-03 LAB
ANION GAP SERPL CALCULATED.3IONS-SCNC: 6 MMOL/L (ref 3–14)
BUN SERPL-MCNC: 45 MG/DL (ref 7–30)
CALCIUM SERPL-MCNC: 9.5 MG/DL (ref 8.5–10.1)
CHLORIDE BLD-SCNC: 105 MMOL/L (ref 94–109)
CO2 SERPL-SCNC: 25 MMOL/L (ref 20–32)
CREAT SERPL-MCNC: 1.1 MG/DL (ref 0.66–1.25)
GFR SERPL CREATININE-BSD FRML MDRD: 75 ML/MIN/1.73M2
GLUCOSE BLD-MCNC: 108 MG/DL (ref 70–99)
HOLD SPECIMEN: NORMAL
POTASSIUM BLD-SCNC: 4.8 MMOL/L (ref 3.4–5.3)
SIROLIMUS BLD-MCNC: 11 UG/L (ref 5–15)
SODIUM SERPL-SCNC: 136 MMOL/L (ref 133–144)
TME LAST DOSE: NORMAL H
TME LAST DOSE: NORMAL H

## 2022-10-03 PROCEDURE — 80048 BASIC METABOLIC PNL TOTAL CA: CPT

## 2022-10-03 PROCEDURE — 80195 ASSAY OF SIROLIMUS: CPT

## 2022-10-03 PROCEDURE — 36415 COLL VENOUS BLD VENIPUNCTURE: CPT

## 2022-10-04 ENCOUNTER — TELEPHONE (OUTPATIENT)
Dept: OPHTHALMOLOGY | Facility: CLINIC | Age: 62
End: 2022-10-04

## 2022-10-04 NOTE — TELEPHONE ENCOUNTER
Reviewed by Dr. Caicedo/Larry and ok to f/u as scheduled with Dr. Juarez in lieu of any worsening symptoms/vision changes    Updated pt with information and to use frequent lubrication and ok to resume eye ointment at night if contact lens out    Pt verbally demonstrated understanding and seemed comfortable with plan.    Chris Mireles RN 10:39 AM 10/04/22    ---      Spoke to pt at 0945    Pt wearing bandage contact lens in right eye only.    Lens out now.    Pt states eye more comfortable with lens out today than when in.    Pt been using artificial tears every 15 minutes when in.    Reviewed will confirm with Dr. Caicedo, but likely ok to leave eye out if more comfortable.    Pt traveling to [place] tomorrow til Sunday.    Reviewed if ok would recommend preservative free tears every 1-2 hours/frequent lubrication.    Will reach out to pt after verifying    Chris Mireles RN 9:53 AM 10/04/22          [facility] Call [facility]    Phone Message    May a detailed message be left on voicemail: yes     Reason for Call: Symptoms or Concerns     If patient has red-flag symptoms, warm transfer to triage line    Current symptom or concern: Bandaid contact lens popped out of the right eye. Pt keaves tomorrow out of town and will not return until Monday 10/10.     Symptoms have been present for: 15 minutes-pt states that his eye actually feels better since it popped out.     Has patient previously been seen for this? Yes    By: Dr. Caicedo placed the lens    Date: 9/29/22  Are there any new or worsening symptoms? Yes      Action Taken: Message routed to:  [facility] EYE    Travel Screening: Not Applicable

## 2022-10-05 DIAGNOSIS — Z94.81 STATUS POST BONE MARROW TRANSPLANT (H): Primary | ICD-10-CM

## 2022-10-06 ENCOUNTER — TELEPHONE (OUTPATIENT)
Dept: TRANSPLANT | Facility: CLINIC | Age: 62
End: 2022-10-06

## 2022-10-06 NOTE — TELEPHONE ENCOUNTER
Patient's wife called to report worsening of his LE swelling, primarily involving ankles and feet. She states that the swelling is equal on both sides, she denies redness or warmth.  She is informed to seek urgent care or emergency services for Nik if he displays the above symptoms or develops shortness of breath or chest discomfort.  Dr. Avila Tobar is scheduled to see Nik on 10/11 and will review status then. She asks that they record his weight daily between now and then.  Patient's wife verbalizes understanding of plan

## 2022-10-10 DIAGNOSIS — Z94.81 STATUS POST BONE MARROW TRANSPLANT (H): ICD-10-CM

## 2022-10-10 DIAGNOSIS — C91.01 ACUTE LYMPHOBLASTIC LEUKEMIA (ALL) IN REMISSION (H): ICD-10-CM

## 2022-10-10 RX ORDER — OMEPRAZOLE 40 MG/1
40 CAPSULE, DELAYED RELEASE ORAL DAILY
Qty: 30 CAPSULE | Refills: 3 | Status: SHIPPED | OUTPATIENT
Start: 2022-10-10

## 2022-10-11 ENCOUNTER — ONCOLOGY VISIT (OUTPATIENT)
Dept: TRANSPLANT | Facility: CLINIC | Age: 62
End: 2022-10-11
Attending: INTERNAL MEDICINE
Payer: COMMERCIAL

## 2022-10-11 ENCOUNTER — TELEPHONE (OUTPATIENT)
Dept: OPTOMETRY | Facility: CLINIC | Age: 62
End: 2022-10-11

## 2022-10-11 ENCOUNTER — APPOINTMENT (OUTPATIENT)
Dept: LAB | Facility: CLINIC | Age: 62
End: 2022-10-11
Attending: INTERNAL MEDICINE
Payer: COMMERCIAL

## 2022-10-11 VITALS
OXYGEN SATURATION: 96 % | HEART RATE: 94 BPM | RESPIRATION RATE: 16 BRPM | TEMPERATURE: 98 F | SYSTOLIC BLOOD PRESSURE: 142 MMHG | DIASTOLIC BLOOD PRESSURE: 76 MMHG | BODY MASS INDEX: 34.11 KG/M2 | WEIGHT: 257.4 LBS | HEIGHT: 73 IN

## 2022-10-11 DIAGNOSIS — T86.09 OTHER COMPLICATION OF BONE MARROW TRANSPLANT (H): ICD-10-CM

## 2022-10-11 DIAGNOSIS — C91.01 ACUTE LYMPHOBLASTIC LEUKEMIA (ALL) IN REMISSION (H): ICD-10-CM

## 2022-10-11 DIAGNOSIS — Z94.81 STATUS POST BONE MARROW TRANSPLANT (H): ICD-10-CM

## 2022-10-11 DIAGNOSIS — T86.09 GVHD AS COMPLICATION OF BONE MARROW TRANSPLANT (H): ICD-10-CM

## 2022-10-11 DIAGNOSIS — D89.813 GVHD AS COMPLICATION OF BONE MARROW TRANSPLANT (H): ICD-10-CM

## 2022-10-11 LAB
ALBUMIN MFR UR ELPH: 9.1 MG/DL
ALBUMIN SERPL BCG-MCNC: 3.9 G/DL (ref 3.5–5.2)
ALP SERPL-CCNC: 107 U/L (ref 40–129)
ALT SERPL W P-5'-P-CCNC: 45 U/L (ref 10–50)
ANION GAP SERPL CALCULATED.3IONS-SCNC: 11 MMOL/L (ref 7–15)
AST SERPL W P-5'-P-CCNC: 55 U/L (ref 10–50)
BASOPHILS # BLD AUTO: 0 10E3/UL (ref 0–0.2)
BASOPHILS NFR BLD AUTO: 0 %
BILIRUB SERPL-MCNC: 0.6 MG/DL
BUN SERPL-MCNC: 45.6 MG/DL (ref 8–23)
CALCIUM SERPL-MCNC: 10 MG/DL (ref 8.8–10.2)
CHLORIDE SERPL-SCNC: 98 MMOL/L (ref 98–107)
CREAT SERPL-MCNC: 1.39 MG/DL (ref 0.67–1.17)
CREAT UR-MCNC: 22 MG/DL
DEPRECATED HCO3 PLAS-SCNC: 23 MMOL/L (ref 22–29)
EOSINOPHIL # BLD AUTO: 0 10E3/UL (ref 0–0.7)
EOSINOPHIL NFR BLD AUTO: 0 %
ERYTHROCYTE [DISTWIDTH] IN BLOOD BY AUTOMATED COUNT: 14.8 % (ref 10–15)
GFR SERPL CREATININE-BSD FRML MDRD: 57 ML/MIN/1.73M2
GLUCOSE SERPL-MCNC: 99 MG/DL (ref 70–99)
HCT VFR BLD AUTO: 30.6 % (ref 40–53)
HGB BLD-MCNC: 11 G/DL (ref 13.3–17.7)
IMM GRANULOCYTES # BLD: 0 10E3/UL
IMM GRANULOCYTES NFR BLD: 1 %
LYMPHOCYTES # BLD AUTO: 2.4 10E3/UL (ref 0.8–5.3)
LYMPHOCYTES NFR BLD AUTO: 34 %
MAGNESIUM SERPL-MCNC: 2 MG/DL (ref 1.7–2.3)
MCH RBC QN AUTO: 34.3 PG (ref 26.5–33)
MCHC RBC AUTO-ENTMCNC: 35.9 G/DL (ref 31.5–36.5)
MCV RBC AUTO: 95 FL (ref 78–100)
MONOCYTES # BLD AUTO: 1.2 10E3/UL (ref 0–1.3)
MONOCYTES NFR BLD AUTO: 17 %
NEUTROPHILS # BLD AUTO: 3.4 10E3/UL (ref 1.6–8.3)
NEUTROPHILS NFR BLD AUTO: 48 %
NRBC # BLD AUTO: 0 10E3/UL
NRBC BLD AUTO-RTO: 0 /100
PLATELET # BLD AUTO: 89 10E3/UL (ref 150–450)
POTASSIUM SERPL-SCNC: 5.1 MMOL/L (ref 3.4–5.3)
PROT SERPL-MCNC: 6.3 G/DL (ref 6.4–8.3)
PROT/CREAT 24H UR: 0.41 MG/MG CR (ref 0–0.2)
RBC # BLD AUTO: 3.21 10E6/UL (ref 4.4–5.9)
SIROLIMUS BLD-MCNC: 7 UG/L (ref 5–15)
SODIUM SERPL-SCNC: 132 MMOL/L (ref 136–145)
TME LAST DOSE: NORMAL H
TME LAST DOSE: NORMAL H
WBC # BLD AUTO: 7 10E3/UL (ref 4–11)

## 2022-10-11 PROCEDURE — 87529 HSV DNA AMP PROBE: CPT | Performed by: INTERNAL MEDICINE

## 2022-10-11 PROCEDURE — G0463 HOSPITAL OUTPT CLINIC VISIT: HCPCS

## 2022-10-11 PROCEDURE — 80195 ASSAY OF SIROLIMUS: CPT | Performed by: INTERNAL MEDICINE

## 2022-10-11 PROCEDURE — 80053 COMPREHEN METABOLIC PANEL: CPT | Performed by: INTERNAL MEDICINE

## 2022-10-11 PROCEDURE — 36591 DRAW BLOOD OFF VENOUS DEVICE: CPT | Performed by: INTERNAL MEDICINE

## 2022-10-11 PROCEDURE — 250N000011 HC RX IP 250 OP 636: Performed by: INTERNAL MEDICINE

## 2022-10-11 PROCEDURE — 85004 AUTOMATED DIFF WBC COUNT: CPT | Performed by: INTERNAL MEDICINE

## 2022-10-11 PROCEDURE — 87799 DETECT AGENT NOS DNA QUANT: CPT | Performed by: INTERNAL MEDICINE

## 2022-10-11 PROCEDURE — 99215 OFFICE O/P EST HI 40 MIN: CPT | Performed by: INTERNAL MEDICINE

## 2022-10-11 PROCEDURE — 83735 ASSAY OF MAGNESIUM: CPT | Performed by: INTERNAL MEDICINE

## 2022-10-11 PROCEDURE — 84156 ASSAY OF PROTEIN URINE: CPT | Performed by: INTERNAL MEDICINE

## 2022-10-11 RX ORDER — HEPARIN SODIUM (PORCINE) LOCK FLUSH IV SOLN 100 UNIT/ML 100 UNIT/ML
5 SOLUTION INTRAVENOUS ONCE
Status: COMPLETED | OUTPATIENT
Start: 2022-10-11 | End: 2022-10-11

## 2022-10-11 RX ORDER — PREDNISONE 20 MG/1
40 TABLET ORAL EVERY OTHER DAY
Qty: 90 TABLET | Refills: 3 | Status: SHIPPED | OUTPATIENT
Start: 2022-10-11 | End: 2022-10-25

## 2022-10-11 RX ORDER — FUROSEMIDE 20 MG
20 TABLET ORAL DAILY
Qty: 30 TABLET | Refills: 0 | Status: SHIPPED | OUTPATIENT
Start: 2022-10-11 | End: 2022-10-25

## 2022-10-11 RX ADMIN — Medication 5 ML: at 15:20

## 2022-10-11 ASSESSMENT — PAIN SCALES - GENERAL: PAINLEVEL: NO PAIN (0)

## 2022-10-11 NOTE — PROGRESS NOTES
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    HPI:   Nik returns for follow up, New mouth sores, eyes worse as well, drop in every hour, and steriods eyedrops twice a days, no back pain and no muscle aches, no rashes, no N/V/D, no abd cramps. No bleeding, no  concerns.      Review of Systems                                                                                                                                         10 point Review of systems was negative     PHYSICAL EXAM                                                                                                                                                 KPS: 80  BP (!) 142/76   Pulse 94   Temp 98  F (36.7  C)   Resp 16   Ht 1.854 m (6' 0.99\")   Wt 116.8 kg (257 lb 6.4 oz)   SpO2 96%   BMI 33.97 kg/m        Wt Readings from Last 4 Encounters:   10/11/22 116.8 kg (257 lb 6.4 oz)   09/20/22 110.7 kg (244 lb)   09/15/22 113.2 kg (249 lb 8 oz)   09/13/22 111.5 kg (245 lb 12.8 oz)      General: NAD.  HEENT: sclera anicteric, injected conjunctive a with erythema and no discharge. Oropharynx with mild lichenoid changes on bilateral inner cheeks, <25% lichenoid changes and erythema, no ulcerations noted1-2mm more prominent on the right buccal mucosa that are new since last examined.  Lungs:  CTA b/l  no wheezes  CV: RRR  no gallop  Abd; soft no tenderness; BS nl   Ext/ Skin: Hyperpigmentation noted on torso, back and anterior pelvis.  LE Trace edema, no skin thickening.    cGVHD therapy started on February 24 2022  - Baseline NIH scoring 2/24/2022 : skin 2 maculopapular rash/erythema with no sclerotic features, mouth score 1 mild symptoms with lichenoid changes less than 25%, eyes score 2 moderate symptoms without new visual impairments due to KCS requiring lubricant eyedrops more than 3 times a day, overall mild scale for her provider  - 3/25/2022 " 1 month NIH treatment assessment on PQRST: skin 2, mouth score 1 mild symptoms with NO lichenoid changes, eyes score 2 moderate symptoms w requiring lubricant eyedrops more than 3 times a day, liver score 1 ALT 3.7x ULN and nl bilirubin   - 3/31  Mouth clear; eyes minimal LFT still abn; no joint or new GI sx  - 4/28 Mouth clear, Left>R eye is mild sclera erythema, lids are pink and irritated appearing, LFT's slow improvement, no new joint, skin, or GI symptoms.   -5/11 mouth with mild erythema but no lichenoid changes, eyes using eyedrops 4-6 times a day score of 2, LFTs significantly improved from baseline slight elevation in alk phos and AST with normal bilirubin, skin with hyperpigmentation from old acute GVHD no active skin rashes, no GI symptoms no musculoskeletal complaints.  -5/26 Mouth with very slight lichenoid changes, erythema.  Eyes still using eyedrops 4-6x per day.  LFTs essentially normal with slight elevation in alk phos.  Skin with hyperpigmentation from old acute GVHD, no active rashes, no GI or MSK concerns.  Skin 0, mouth 0 but with lichenoid changes, eyes 2  -6/7/22 Mouth with no lichenoid changes, erythema.  Eyes still using eyedrops 4-6x per day.  LFTs essentially normal with slight elevation in alk phos.  Skin with hyperpigmentation from old acute GVHD, no active rashes, no GI or MSK concerns.  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  Acute lymphoblastic leukemia, [place] chromosome positive in CR, MRD neg. S/p allo sib PBSCT. (ABO matched)  HCT-CI score: 3 (prior solid tumor)  Day +100 bone marrow biopsy is 100% donor with no morphologic or flow cytometric  evidence of leukemia BCR abl is pending.  - Day +180 restaging BM bx- still in CR  100% donor;  2/16/22 BCR ABL major breakpoint undetectable.   - 1 year restaging 8/18/2022: Markedly hypocellular bone marrow [less than 10% cellular] with maturing trilineage hematopoiesis and no definite morphologic or immunophenotypic evidence for involvement by B lymphoblastic leukemia. BCRABL1 PCR not detected, bone marrow % donor. FISH NORMAL No evidence of BCR-ABL1 fusion  - Discussed with the patient maintenance with TKI posttransplantation given his Ph positive ALL that have not been started previously presumed secondary to multiple active issues specifically infections and COVID.  We will reconsider this patient will think about whether this is something he would like to consider he was previously on Dasatinib, we would start on a low dose and increase as tolerated.  This is on hold now pending active GVHD therapy.    2.  HEME:  - Transfuse for hgb <7g/dL; plt < 10k.  No transfusions needs  - Pulmonary emboli: He developed a pulmonary emboli in the setting of receiving PEG asparaginase (ie provoked thrombus).  Xarelto complete.   - Counts are tolerating valcyte well.     3.  FEN/Renal:  - Cr improved with switching patient to sirolimus briefly, now with new NEGRA on 10/11 with third spacing.  We will give 1 dose of 20 mg of Lasix on 10/12 and patient to message with changes in weight.  Will avoid additional doses of Lasix given risk for intravascular depletion further aggravating NEGRA.  Typically edema/fluid retention associated with sirolimus toxicity does not respond well to Lasix.  - Magnesium 1.6, hypomag secondary to tac. PO magnesium supplementation 3 pills twice daily.  Recheck level Friday.  - He has a history of renal cancer and resection. Has a single kidney.    4.    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   #Skin GVHD- history of biopsy proven GVHD of the skin 8/30/2021;  resolved.   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 Mild flares with transaminitis-does not seem to be active currently.  # Ocular GVHD: follows with ophthalmology. Maxitrol to lids, continue refreshing drops QID. Score 2-3  Seen 9/20 by Dr. Juarez:  1.) Dry Eye OU  -s/p BMT 08/2021  -Worsening dryness right eye>left eye, symptomatic for photophobia/tearing  -Right eye cornea improved with BCL wear (though not in today), left eye stable without sig stain  -Failed: Restasis/Xiidra (stinging), plugs (scarred puncta)  -Currently on: AT 3-10x/day as needed.   -Doing well on BCL with daily oflox right eye, FML bid OU  -Replaced BCL today. Continue current regimen. Air Optix N&D  -Consider monthly replacement of BCL for now, until eyes have stabilized  2.) Hyperopia/Presbyopia OU  -Previously BCVA 20/20 with correction. Uses low plus readers for distance and higher plus readers for near  -Right eye acuity back to baseline  # Oral GVHD: dex s/s three-4 times a day; tac ointment to lips as needed.  Clotrimazole cream added after seeing dermatology.    Previous therapies  Tacrolimus-previously decided to stay on same dose, no checks of levels if clinically stable, and no need switch to sirolimus. (keep tac low as gvhd is quiet and viremia). 5/10 level 45 with starting of COVID therapy and D-D intractions (Paxlovid for COVID19, which has a drug interaction with tacrolimus-Ritonavir increases serum levels of tacrolimus).   Tacrolimus level subtherapeutic, increase to 2.5 mg twice daily recently, 8/23/2022 instructed to change tacrolimus to 2 mg twice daily. 9/6 with mild NEGRA, hyperkalemia, hypomagnesemia, and reports some tremors and cramps, suspect tacrolimus to be high therefore empirically decreased to 1.5 twice daily, skip morning dose of tacrolimus and took 2 mg today after his afternoon labs. Decrease to 1mg BID on Saturday.  Sirolimus  9/21 despite fluids and a dose  adjustment of tacrolimus patient seem to have persistence NORBERTO related NEGRA, therefore after discussion with the patient decision was made to transition to sirolimus that was started on 9/21, patient initially seem to tolerate this well with normalization of kidney function  10/11 presented with flares of ocular and oral GVHD, fluid retention and gain of 5-6 kg, lower extremity edema, abdominal distention, total protein to creatinine ratio elevated at 0.4, in addition to elevation in BUN and creatinine, HTN.  Picture most consistent with sirolimus related side effects.  Less likely that the patient will tolerate even a lower level of sirolimus.  Therefore the patient was instructed to stop sirolimus, last dose on 10/10.  We will recheck level on Friday with follow-up.    Corticosteroids  3/1-3/16: prednisone 100mg every day  3/17 75mg every day   3/31 75 alt with 50  4/6: 75 alt with 25mg every other day  4/12 pred tapered to 60 alternating with 25mg   4/15 In setting of viruses trying to reactivate,GVHD stable: Taper 60/15mg alternating.  4/20 decrease pred further to 50/10.    4/25 taper pred to 50/0 every other day as long as symptoms stable.   5/2 Pred decrease 40/0 alternating every other day.   5/13 decrease to 30/0  5/20 decrease to 20/0 then slowly by 5 mg weekly  6/7 decrease to 10/0  Went up to 15/0 with mild increased LFTs  8/23/2022 increased to 20/0, with persistence of oral ulcers and active ocular GVHD.  Discussed with the patient the importance of stopping chewing of nicotine gums for prolonged hours as that would not help with the healing of the oral ulcers.  I discussed with the patient alternatives of nicotine patches that he will reconsider and let me know.  9/6 decreased to 15 alternating with 0  9/13 decreased to 10 alternating with 0  9/21 discontinued tacrolimus given persistent NEGRA and single kidney and start the patient on sirolimus without loading dose.  We will get a list of possible side  effects and adverse events from the Pharm.D. see sirolimus section above.  10/11 GVHD flare.  Sirolimus to be stopped.  Patient will resume prednisone 40 mg daily as of 10/12 to follow-up on Friday.    Belumosudil  Patient intolerant/with disease flares on tacrolimus and sirolimus see above.  Discussed with the patient the different options for therapy of chronic GVHD that are FDA approved.  Given the side effect profiles after discussing in detail and given the least nephrotoxicity and expected response, taking into account other metabolic effect as well.  We will proceed with prior authorization with belumosudil.  Patient was given material to review for possible expected adverse events and side effects with both amoxicillin and ruxolitinib.    5.  ID: Afebrile   #COVID   Positive via homed test 5/8. Treated with Paxlovid with resolution of symptoms.  Had recurrence on 5/18.  Now asymptomatic.  Covid 11/21, 12/21, due for booster will recommend locally but he would like it here     #Pulmonary infiltrates:   4/20 CT chest with new RUL infiltrate. Highly immunocompromised. 4/22 Fungitell/asp GM were neg. BAL 4/29 NGTD, increased micafungin to 150mg IV daily-- f/u 6/1 Dr Garcia CT with mixed results, followed with Dr. Garcia August 2022 with resolution of pulmonary nodules and normalization of LFTs we will switch patient will switch patient to posaconazole prophylactic dose and monitor LFTs and any infectious concerns closely.    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 4/15 started valcyte 900mg PO BID. 4/20 CMV <137, 5/10 ND, decreased to 450mg BID 4/30 - continue 4 weeks as long as CMV <137 till 5/28 + weekly CMV  - Switch to acyclovir after completion of current therapy 5/28. 10/11 CMV ND. Check monthly on IST    - PPx:   ACV  Posaconazole (previously on micafungin with LFts abl, hx of pulmonary nodules needign treatment dose). Pentamidine, last 9/6, we will schedule   Azithro 250mg daily (stopped levaquin due to  possible tendonitis).     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 S/p 4/22 and 5/4/22 rituximab 375mg/m2 5/10 ND x2, 6/7 ND, 8/18/2022 971, 10/1 ND, check every other month on IST  S/p covid vaccine series 12/2021  S/p evusheld   Deferred annual vaccines in the setting of GVHD and immunosuppression therapy, discussed with patient    6. Endo: Hx of graves disease; On synthroid 175 mcg daily. TSH normal 4/6/2022 no reflex to T4.     7. CV: Norvasc: 2.5mg, increase to 5mg 9/6    8. GI:   -Omeprazole for heartburn  -LFTs as above, now normal, 9/6 decreased ursodiol to daily, monitor, posaconazole level from August 2022 within normal.  Will defer starting statins for hyperlipidemia given recent transaminitis and start of posaconazole, will revisit and initiate appropriate therapy.  Discussed with the patient.    9. Psych:   - Situational anxiety - lexapro 10mg daily.   - Insomnia: worse on steroids. Ativan, trazadone, melatonin    10. MSK: left food drop, PT. Occasional muscle cramps discussed optimizing vitamin D levels and considering vitamin D, some of the symptomatology of muscle cramps are likely related to chronic GVHD.    11. HCM/Age appropriate cancer screening:  -Discussed with the patient importance of age-appropriate cancer screening including colonoscopies, he is due for 1.  -Discussed importance of at least once a year vitamin D level check, 8/2020 236 low normal will start calcium and vitamin D replacement and recheck levels in 2 to 3 months  -Screening for secondary iron overload, 8/2022 ferritin 1023 we will recheck in 2-3 months  -Needs yearly thyroid check and lipid profile check, 8/2022 cholesterol 290 elevated, triglycerides 146 and LDL elevated at 162 discussed with the patient he was on statins prior to transplantation that needs to be restarted [place] will hold off given start of posaconazole and recent transaminitis and LFT abnormalities related to GVHD see GI section  above  -9/6/2022 DEXA scan Based on BMD diagnosis is consistent with normal bone density based on WHO criteria Ref. 1   -PFTs 9/20   -Metabolic other, 8/2022 testosterone within normal    Plan:    10/11 GVHD flare.    Sirolimus to be stopped with SE detailed above.  Last dose 10/10.  Recheck level on Friday.  Patient will resume prednisone 40 mg daily as of 10/12 to follow-up on Friday.  We will give 1 dose of 20 mg of Lasix on 10/12 and patient to message with changes in weight.   Will get heart US and kidney US on Friday  Refer to [facility]  Follow-up on Friday with labs and GIRISH, please call me when you see patient in clinic  Follow-up with me on Tuesday.       I spent 45 minutes in the care of this patient today, which included time necessary for preparation for the visit, obtaining history, ordering medications/tests/procedures as medically indicated, review of pertinent medical literature, counseling of the patient, communication of recommendations to the care team, and documentation time.

## 2022-10-11 NOTE — PATIENT INSTRUCTIONS
Stop sirolimus till we call you    Will check urine test today    Will get heart US and kidney US on Friday    Start lasix tomorrow 20mg daily with daily weights    Increase prednisone to 40 mg every other day

## 2022-10-11 NOTE — NURSING NOTE
Chief Complaint   Patient presents with     Port Draw     Labs drawn by RN via port, vitals taken.     Port accessed with 20 gauge 3/4 inch flat needle by RN, labs collected, line flushed with saline and heparin.  Vitals taken. Pt checked in for appointment(s).    Usha Sanabria RN

## 2022-10-11 NOTE — LETTER
"    10/11/2022         RE: Nik Nava  [id number] [facility] [id number]        Dear Colleague,    Thank you for referring your patient, Nik Nava, to the [facility] BLOOD AND MARROW TRANSPLANT PROGRAM [place]. Please see a copy of my visit note below.      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    HPI:   Nik returns for follow up, New mouth sores, eyes worse as well, drop in every hour, and steriods eyedrops twice a days, no back pain and no muscle aches, no rashes, no N/V/D, no abd cramps. No bleeding, no  concerns.      Review of Systems                                                                                                                                         10 point Review of systems was negative     PHYSICAL EXAM                                                                                                                                                 KPS: 80  BP (!) 142/76   Pulse 94   Temp 98  F (36.7  C)   Resp 16   Ht 1.854 m (6' 0.99\")   Wt 116.8 kg (257 lb 6.4 oz)   SpO2 96%   BMI 33.97 kg/m        Wt Readings from Last 4 Encounters:   10/11/22 116.8 kg (257 lb 6.4 oz)   09/20/22 110.7 kg (244 lb)   09/15/22 113.2 kg (249 lb 8 oz)   09/13/22 111.5 kg (245 lb 12.8 oz)      General: NAD.  HEENT: sclera anicteric, injected conjunctive a with erythema and no discharge. Oropharynx with mild lichenoid changes on bilateral inner cheeks, <25% lichenoid changes and erythema, no ulcerations noted1-2mm more prominent on the right buccal mucosa that are new since last examined.  Lungs:  CTA b/l  no wheezes  CV: RRR  no gallop  Abd; soft no tenderness; BS nl   Ext/ Skin: Hyperpigmentation noted on torso, back and anterior pelvis.  LE Trace edema, no skin thickening.    cGVHD therapy started on February 24 2022  - Baseline NIH scoring 2/24/2022 : skin 2 maculopapular " rash/erythema with no sclerotic features, mouth score 1 mild symptoms with lichenoid changes less than 25%, eyes score 2 moderate symptoms without new visual impairments due to KCS requiring lubricant eyedrops more than 3 times a day, overall mild scale for her provider  - 3/25/2022 1 month NIH treatment assessment on PQRST: skin 2, mouth score 1 mild symptoms with NO lichenoid changes, eyes score 2 moderate symptoms w requiring lubricant eyedrops more than 3 times a day, liver score 1 ALT 3.7x ULN and nl bilirubin   - 3/31  Mouth clear; eyes minimal LFT still abn; no joint or new GI sx  - 4/28 Mouth clear, Left>R eye is mild sclera erythema, lids are pink and irritated appearing, LFT's slow improvement, no new joint, skin, or GI symptoms.   -5/11 mouth with mild erythema but no lichenoid changes, eyes using eyedrops 4-6 times a day score of 2, LFTs significantly improved from baseline slight elevation in alk phos and AST with normal bilirubin, skin with hyperpigmentation from old acute GVHD no active skin rashes, no GI symptoms no musculoskeletal complaints.  -5/26 Mouth with very slight lichenoid changes, erythema.  Eyes still using eyedrops 4-6x per day.  LFTs essentially normal with slight elevation in alk phos.  Skin with hyperpigmentation from old acute GVHD, no active rashes, no GI or MSK concerns.  Skin 0, mouth 0 but with lichenoid changes, eyes 2  -6/7/22 Mouth with no lichenoid changes, erythema.  Eyes still using eyedrops 4-6x per day.  LFTs essentially normal with slight elevation in alk phos.  Skin with hyperpigmentation from old acute GVHD, no active rashes, no GI or MSK concerns.  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 S/p allo sib PBSCT. (ABO matched)  HCT-CI score: 3 (prior solid tumor)  Day +100 bone marrow biopsy is 100% donor with no morphologic or flow cytometric evidence of leukemia BCR abl is pending.  - Day +180 restaging BM bx- still in CR  100% donor;  2/16/22 BCR ABL major breakpoint undetectable.   - 1 year restaging 8/18/2022: Markedly hypocellular bone marrow [less than 10% cellular] with maturing trilineage hematopoiesis and no definite morphologic or immunophenotypic evidence for involvement by B lymphoblastic leukemia. BCRABL1 PCR not detected, bone marrow % donor. FISH NORMAL No evidence of BCR-ABL1 fusion  - Discussed with the patient maintenance with TKI posttransplantation given his Ph positive ALL that have not been started previously presumed secondary to multiple active issues specifically infections and COVID.  We will reconsider this patient will think about whether this is something he would like to consider he was previously on Dasatinib, we would start on a low dose and increase as tolerated.  This is on hold now pending active GVHD therapy.    2.  HEME:  - Transfuse for hgb <7g/dL; plt < 10k.  No transfusions needs  - Pulmonary emboli: He developed a pulmonary emboli in the setting of receiving PEG asparaginase (ie provoked thrombus).  Xarelto complete.   - Counts are tolerating valcyte well.     3.  FEN/Renal:  - Cr improved with switching patient to sirolimus briefly, now with new NEGRA on 10/11 with third spacing.  We will give 1 dose of 20 mg of Lasix on 10/12 and patient to message with changes in weight.  Will avoid additional doses of Lasix given risk for intravascular depletion further aggravating NEGRA.  Typically edema/fluid retention associated with sirolimus toxicity does not respond well to Lasix.  - Magnesium 1.6, hypomag secondary to tac. PO magnesium  supplementation 3 pills twice daily.  Recheck level Friday.  - He has a history of renal cancer and resection. Has a single kidney.    4.    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   #Skin GVHD- history of biopsy proven GVHD of the skin 8/30/2021; resolved.   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 Mild flares with transaminitis-does not seem to be active currently.  # Ocular GVHD: follows with ophthalmology. Maxitrol to lids, continue refreshing drops QID. Score 2-3  Seen 9/20 by Dr. Juarez:  1.) Dry Eye OU  -s/p BMT 08/2021  -Worsening dryness right eye>left eye, symptomatic for photophobia/tearing  -Right eye cornea improved with BCL wear (though not in today), left eye stable without sig stain  -Failed: Restasis/Xiidra (stinging), plugs (scarred puncta)  -Currently on: AT 3-10x/day as needed.   -Doing well on BCL with daily oflox right eye, FML bid OU  -Replaced BCL today. Continue current regimen. Air Optix N&D  -Consider monthly replacement of BCL for now, until eyes have stabilized  2.) Hyperopia/Presbyopia OU  -Previously BCVA 20/20 with correction. Uses low plus readers for distance and higher plus readers for near  -Right eye acuity back to baseline  # Oral GVHD: dex s/s three-4 times a day; tac ointment to lips as needed.  Clotrimazole cream added after seeing dermatology.    Previous therapies  Tacrolimus-previously decided to stay on same dose, no checks of levels if clinically stable, and no need switch to sirolimus. (keep tac low as gvhd is quiet and viremia). 5/10 level 45 with starting of COVID therapy and D-D intractions (Paxlovid for COVID19, which has a drug interaction with tacrolimus-Ritonavir increases serum levels of tacrolimus).   Tacrolimus level subtherapeutic, increase to 2.5 mg twice daily recently, 8/23/2022 instructed to change tacrolimus to 2 mg twice daily. 9/6 with mild NEGRA, hyperkalemia, hypomagnesemia,  and reports some tremors and cramps, suspect tacrolimus to be high therefore empirically decreased to 1.5 twice daily, skip morning dose of tacrolimus and took 2 mg today after his afternoon labs. Decrease to 1mg BID on Saturday.  Sirolimus  9/21 despite fluids and a dose adjustment of tacrolimus patient seem to have persistence NORBERTO related NEGRA, therefore after discussion with the patient decision was made to transition to sirolimus that was started on 9/21, patient initially seem to tolerate this well with normalization of kidney function  10/11 presented with flares of ocular and oral GVHD, fluid retention and gain of 5-6 kg, lower extremity edema, abdominal distention, total protein to creatinine ratio elevated at 0.4, in addition to elevation in BUN and creatinine, HTN.  Picture most consistent with sirolimus related side effects.  Less likely that the patient will tolerate even a lower level of sirolimus.  Therefore the patient was instructed to stop sirolimus, last dose on 10/10.  We will recheck level on Friday with follow-up.    Corticosteroids  3/1-3/16: prednisone 100mg every day  3/17 75mg every day   3/31 75 alt with 50  4/6: 75 alt with 25mg every other day  4/12 pred tapered to 60 alternating with 25mg   4/15 In setting of viruses trying to reactivate,GVHD stable: Taper 60/15mg alternating.  4/20 decrease pred further to 50/10.    4/25 taper pred to 50/0 every other day as long as symptoms stable.   5/2 Pred decrease 40/0 alternating every other day.   5/13 decrease to 30/0  5/20 decrease to 20/0 then slowly by 5 mg weekly  6/7 decrease to 10/0  Went up to 15/0 with mild increased LFTs  8/23/2022 increased to 20/0, with persistence of oral ulcers and active ocular GVHD.  Discussed with the patient the importance of stopping chewing of nicotine gums for prolonged hours as that would not help with the healing of the oral ulcers.  I discussed with the patient alternatives of nicotine patches that he will  reconsider and let me know.  9/6 decreased to 15 alternating with 0  9/13 decreased to 10 alternating with 0  9/21 discontinued tacrolimus given persistent NEGRA and single kidney and start the patient on sirolimus without loading dose.  We will get a list of possible side effects and adverse events from the Pharm.D. see sirolimus section above.  10/11 GVHD flare.  Sirolimus to be stopped.  Patient will resume prednisone 40 mg daily as of 10/12 to follow-up on Friday.    Belumosudil  Patient intolerant/with disease flares on tacrolimus and sirolimus see above.  Discussed with the patient the different options for therapy of chronic GVHD that are FDA approved.  Given the side effect profiles after discussing in detail and given the least nephrotoxicity and expected response, taking into account other metabolic effect as well.  We will proceed with prior authorization with belumosudil.  Patient was given material to review for possible expected adverse events and side effects with both amoxicillin and ruxolitinib.    5.  ID: Afebrile   #COVID   Positive via homed test 5/8. Treated with Paxlovid with resolution of symptoms.  Had recurrence on 5/18.  Now asymptomatic.  Covid 11/21, 12/21, due for booster will recommend locally but he would like it here     #Pulmonary infiltrates:   4/20 CT chest with new RUL infiltrate. Highly immunocompromised. 4/22 Fungitell/asp GM were neg. BAL 4/29 NGTD, increased micafungin to 150mg IV daily-- f/u 6/1 Dr Garcia CT with mixed results, followed with Dr. Garcia August 2022 with resolution of pulmonary nodules and normalization of LFTs we will switch patient will switch patient to posaconazole prophylactic dose and monitor LFTs and any infectious concerns closely.    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 4/15 started valcyte 900mg PO BID. 4/20 CMV <137, 5/10 ND, decreased to 450mg BID 4/30 - continue 4 weeks as long as CMV <137 till 5/28 + weekly CMV  - Switch to acyclovir after  completion of current therapy 5/28. 10/11 CMV ND. Check monthly on IST    - PPx:   ACV  Posaconazole (previously on micafungin with LFts abl, hx of pulmonary nodules needign treatment dose). Pentamidine, last 9/6, we will schedule   Azithro 250mg daily (stopped levaquin due to possible tendonitis).     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 S/p 4/22 and 5/4/22 rituximab 375mg/m2 5/10 ND x2, 6/7 ND, 8/18/2022 971, 10/1 ND, check every other month on IST  S/p covid vaccine series 12/2021  S/p evusheld   Deferred annual vaccines in the setting of GVHD and immunosuppression therapy, discussed with patient    6. Endo: Hx of graves disease; On synthroid 175 mcg daily. TSH normal 4/6/2022 no reflex to T4.     7. CV: Norvasc: 2.5mg, increase to 5mg 9/6    8. GI:   -Omeprazole for heartburn  -LFTs as above, now normal, 9/6 decreased ursodiol to daily, monitor, posaconazole level from August 2022 within normal.  Will defer starting statins for hyperlipidemia given recent transaminitis and start of posaconazole, will revisit and initiate appropriate therapy.  Discussed with the patient.    9. Psych:   - Situational anxiety - lexapro 10mg daily.   - Insomnia: worse on steroids. Ativan, trazadone, melatonin    10. MSK: left food drop, PT. Occasional muscle cramps discussed optimizing vitamin D levels and considering vitamin D, some of the symptomatology of muscle cramps are likely related to chronic GVHD.    11. HCM/Age appropriate cancer screening:  -Discussed with the patient importance of age-appropriate cancer screening including colonoscopies, he is due for 1.  -Discussed importance of at least once a year vitamin D level check, 8/2020 236 low normal will start calcium and vitamin D replacement and recheck levels in 2 to 3 months  -Screening for secondary iron overload, 8/2022 ferritin 1023 we will recheck in 2-3 months  -Needs yearly thyroid check and lipid profile check, 8/2022 cholesterol 290 elevated,  triglycerides 146 and LDL elevated at 162 discussed with the patient he was on statins prior to transplantation that needs to be restarted [place] will hold off given start of posaconazole and recent transaminitis and LFT abnormalities related to GVHD see GI section above  -9/6/2022 DEXA scan Based on BMD diagnosis is consistent with normal bone density based on WHO criteria Ref. 1   -PFTs 9/20   -Metabolic other, 8/2022 testosterone within normal    Plan:    10/11 GVHD flare.    Sirolimus to be stopped with SE detailed above.  Last dose 10/10.  Recheck level on Friday.  Patient will resume prednisone 40 mg daily as of 10/12 to follow-up on Friday.  We will give 1 dose of 20 mg of Lasix on 10/12 and patient to message with changes in weight.   Will get heart US and kidney US on Friday  Refer to [facility]  Follow-up on Friday with labs and GIRISH, please call me when you see patient in clinic  Follow-up with me on Tuesday.       I spent 45 minutes in the care of this patient today, which included time necessary for preparation for the visit, obtaining history, ordering medications/tests/procedures as medically indicated, review of pertinent medical literature, counseling of the patient, communication of recommendations to the care team, and documentation time.        Sincerely,    Akshat Tobar MD

## 2022-10-11 NOTE — NURSING NOTE
Swab from right cheek and midstream urine collected in clinic, pt tolerated. Specimen sent to lab.    Yamilet aMrley CMA on 10/11/2022 at 5:02 PM

## 2022-10-11 NOTE — TELEPHONE ENCOUNTER
[facility] Call Center    Phone Message    May a detailed message be left on voicemail: yes     Reason for Call: Other: Pt wife called to ask if Pt should continue taking the eye drops or not and would either like a phone call back or a [employer] message is just fine.      Action Taken: Message routed to:  [facility] ([facility] EYE    Travel Screening: Not Applicable

## 2022-10-11 NOTE — NURSING NOTE
"Oncology Rooming Note    October 11, 2022 3:31 PM   Nik Nava is a 62 year old male who presents for:    Chief Complaint   Patient presents with     Port Draw     Labs drawn by RN via port, vitals taken.     Oncology Clinic Visit     [facility] RETURN - ALL     Initial Vitals: BP (!) 142/76   Pulse 94   Temp 98  F (36.7  C)   Resp 16   Ht 1.854 m (6' 0.99\")   Wt 116.8 kg (257 lb 6.4 oz)   SpO2 96%   BMI 33.97 kg/m   Estimated body mass index is 33.97 kg/m  as calculated from the following:    Height as of this encounter: 1.854 m (6' 0.99\").    Weight as of this encounter: 116.8 kg (257 lb 6.4 oz). Body surface area is 2.45 meters squared.  No Pain (0) Comment: Data Unavailable   No LMP for male patient.  Allergies reviewed: Yes  Medications reviewed: Yes    Medications: Medication refills not needed today.  Pharmacy name entered into [employer] Mobile:    [facility] - [place], [place] - [address] [facility] [place], [place] - [phone]    Clinical concerns: No new concerns.       Kelvin Gomez LPN            "

## 2022-10-12 PROBLEM — T86.09 GVHD AS COMPLICATION OF BONE MARROW TRANSPLANT (H): Status: ACTIVE | Noted: 2022-10-12

## 2022-10-12 PROBLEM — D89.813 GVHD AS COMPLICATION OF BONE MARROW TRANSPLANT (H): Status: ACTIVE | Noted: 2022-10-12

## 2022-10-12 LAB
CMV DNA SPEC NAA+PROBE-ACNC: NOT DETECTED IU/ML
EBV DNA # SPEC NAA+PROBE: NOT DETECTED COPIES/ML
HSV1 DNA SPEC QL NAA+PROBE: NOT DETECTED
HSV2 DNA SPEC QL NAA+PROBE: NOT DETECTED

## 2022-10-13 DIAGNOSIS — D89.813 GVHD AS COMPLICATION OF BONE MARROW TRANSPLANT (H): Primary | ICD-10-CM

## 2022-10-13 DIAGNOSIS — C91.01 ACUTE LYMPHOBLASTIC LEUKEMIA (ALL) IN REMISSION (H): ICD-10-CM

## 2022-10-13 DIAGNOSIS — T86.09 GVHD AS COMPLICATION OF BONE MARROW TRANSPLANT (H): Primary | ICD-10-CM

## 2022-10-14 ENCOUNTER — APPOINTMENT (OUTPATIENT)
Dept: LAB | Facility: CLINIC | Age: 62
End: 2022-10-14
Attending: INTERNAL MEDICINE
Payer: COMMERCIAL

## 2022-10-14 ENCOUNTER — TELEPHONE (OUTPATIENT)
Dept: NEPHROLOGY | Facility: CLINIC | Age: 62
End: 2022-10-14

## 2022-10-14 ENCOUNTER — ONCOLOGY VISIT (OUTPATIENT)
Dept: TRANSPLANT | Facility: CLINIC | Age: 62
End: 2022-10-14
Attending: INTERNAL MEDICINE
Payer: COMMERCIAL

## 2022-10-14 ENCOUNTER — HOSPITAL ENCOUNTER (OUTPATIENT)
Dept: CARDIOLOGY | Facility: CLINIC | Age: 62
Discharge: HOME | End: 2022-10-14
Attending: INTERNAL MEDICINE
Payer: COMMERCIAL

## 2022-10-14 ENCOUNTER — TELEPHONE (OUTPATIENT)
Dept: ONCOLOGY | Facility: CLINIC | Age: 62
End: 2022-10-14

## 2022-10-14 ENCOUNTER — HOSPITAL ENCOUNTER (OUTPATIENT)
Dept: ULTRASOUND IMAGING | Facility: CLINIC | Age: 62
Discharge: HOME | End: 2022-10-14
Attending: INTERNAL MEDICINE
Payer: COMMERCIAL

## 2022-10-14 ENCOUNTER — HOSPITAL ENCOUNTER (OUTPATIENT)
Dept: ULTRASOUND IMAGING | Facility: CLINIC | Age: 62
Discharge: HOME | End: 2022-10-14
Attending: INTERNAL MEDICINE | Admitting: INTERNAL MEDICINE
Payer: COMMERCIAL

## 2022-10-14 VITALS
WEIGHT: 251.7 LBS | BODY MASS INDEX: 33.22 KG/M2 | OXYGEN SATURATION: 98 % | DIASTOLIC BLOOD PRESSURE: 68 MMHG | HEART RATE: 97 BPM | TEMPERATURE: 97.4 F | SYSTOLIC BLOOD PRESSURE: 147 MMHG | RESPIRATION RATE: 16 BRPM

## 2022-10-14 DIAGNOSIS — T86.09 OTHER COMPLICATION OF BONE MARROW TRANSPLANT (H): ICD-10-CM

## 2022-10-14 DIAGNOSIS — T86.09 GVHD AS COMPLICATION OF BONE MARROW TRANSPLANT (H): ICD-10-CM

## 2022-10-14 DIAGNOSIS — D89.813 GVHD AS COMPLICATION OF BONE MARROW TRANSPLANT (H): ICD-10-CM

## 2022-10-14 DIAGNOSIS — T86.09 GVHD AS COMPLICATION OF BONE MARROW TRANSPLANT (H): Primary | ICD-10-CM

## 2022-10-14 DIAGNOSIS — Z94.81 STATUS POST BONE MARROW TRANSPLANT (H): ICD-10-CM

## 2022-10-14 DIAGNOSIS — C91.01 ACUTE LYMPHOBLASTIC LEUKEMIA (ALL) IN REMISSION (H): ICD-10-CM

## 2022-10-14 DIAGNOSIS — D89.813 GVHD AS COMPLICATION OF BONE MARROW TRANSPLANT (H): Primary | ICD-10-CM

## 2022-10-14 DIAGNOSIS — R79.89 ELEVATED SERUM CREATININE: ICD-10-CM

## 2022-10-14 LAB
ALBUMIN SERPL BCG-MCNC: 3.9 G/DL (ref 3.5–5.2)
ALP SERPL-CCNC: 122 U/L (ref 40–129)
ALT SERPL W P-5'-P-CCNC: 45 U/L (ref 10–50)
ANION GAP SERPL CALCULATED.3IONS-SCNC: 12 MMOL/L (ref 7–15)
AST SERPL W P-5'-P-CCNC: 47 U/L (ref 10–50)
BASOPHILS # BLD AUTO: 0 10E3/UL (ref 0–0.2)
BASOPHILS NFR BLD AUTO: 0 %
BILIRUB SERPL-MCNC: 0.6 MG/DL
BUN SERPL-MCNC: 42 MG/DL (ref 8–23)
CALCIUM SERPL-MCNC: 10 MG/DL (ref 8.8–10.2)
CHLORIDE SERPL-SCNC: 103 MMOL/L (ref 98–107)
CREAT SERPL-MCNC: 1.16 MG/DL (ref 0.67–1.17)
DEPRECATED HCO3 PLAS-SCNC: 22 MMOL/L (ref 22–29)
EOSINOPHIL # BLD AUTO: 0 10E3/UL (ref 0–0.7)
EOSINOPHIL NFR BLD AUTO: 0 %
ERYTHROCYTE [DISTWIDTH] IN BLOOD BY AUTOMATED COUNT: 15.2 % (ref 10–15)
GFR SERPL CREATININE-BSD FRML MDRD: 70 ML/MIN/1.73M2
GLUCOSE SERPL-MCNC: 272 MG/DL (ref 70–99)
HCT VFR BLD AUTO: 33.1 % (ref 40–53)
HGB BLD-MCNC: 11.9 G/DL (ref 13.3–17.7)
IMM GRANULOCYTES # BLD: 0.1 10E3/UL
IMM GRANULOCYTES NFR BLD: 1 %
LVEF ECHO: NORMAL
LYMPHOCYTES # BLD AUTO: 1.7 10E3/UL (ref 0.8–5.3)
LYMPHOCYTES NFR BLD AUTO: 24 %
MAGNESIUM SERPL-MCNC: 2.1 MG/DL (ref 1.7–2.3)
MCH RBC QN AUTO: 34.9 PG (ref 26.5–33)
MCHC RBC AUTO-ENTMCNC: 36 G/DL (ref 31.5–36.5)
MCV RBC AUTO: 97 FL (ref 78–100)
MONOCYTES # BLD AUTO: 0.3 10E3/UL (ref 0–1.3)
MONOCYTES NFR BLD AUTO: 4 %
NEUTROPHILS # BLD AUTO: 5 10E3/UL (ref 1.6–8.3)
NEUTROPHILS NFR BLD AUTO: 71 %
NRBC # BLD AUTO: 0 10E3/UL
NRBC BLD AUTO-RTO: 0 /100
PLAT MORPH BLD: NORMAL
PLATELET # BLD AUTO: 77 10E3/UL (ref 150–450)
POTASSIUM SERPL-SCNC: 4.7 MMOL/L (ref 3.4–5.3)
PROT SERPL-MCNC: 6.5 G/DL (ref 6.4–8.3)
RBC # BLD AUTO: 3.41 10E6/UL (ref 4.4–5.9)
RBC MORPH BLD: NORMAL
RETICS # AUTO: 0.06 10E6/UL (ref 0.03–0.1)
RETICS/RBC NFR AUTO: 1.6 % (ref 0.5–2)
SIROLIMUS BLD-MCNC: 3.5 UG/L (ref 5–15)
SODIUM SERPL-SCNC: 137 MMOL/L (ref 136–145)
TME LAST DOSE: ABNORMAL H
TME LAST DOSE: ABNORMAL H
WBC # BLD AUTO: 7.1 10E3/UL (ref 4–11)

## 2022-10-14 PROCEDURE — 76705 ECHO EXAM OF ABDOMEN: CPT | Mod: 26 | Performed by: RADIOLOGY

## 2022-10-14 PROCEDURE — 93975 VASCULAR STUDY: CPT | Mod: 26 | Performed by: RADIOLOGY

## 2022-10-14 PROCEDURE — 36591 DRAW BLOOD OFF VENOUS DEVICE: CPT

## 2022-10-14 PROCEDURE — 82040 ASSAY OF SERUM ALBUMIN: CPT

## 2022-10-14 PROCEDURE — 85060 BLOOD SMEAR INTERPRETATION: CPT | Performed by: PATHOLOGY

## 2022-10-14 PROCEDURE — 250N000011 HC RX IP 250 OP 636: Performed by: INTERNAL MEDICINE

## 2022-10-14 PROCEDURE — 83735 ASSAY OF MAGNESIUM: CPT

## 2022-10-14 PROCEDURE — G0463 HOSPITAL OUTPT CLINIC VISIT: HCPCS | Mod: 25

## 2022-10-14 PROCEDURE — 80053 COMPREHEN METABOLIC PANEL: CPT

## 2022-10-14 PROCEDURE — 93306 TTE W/DOPPLER COMPLETE: CPT

## 2022-10-14 PROCEDURE — 80195 ASSAY OF SIROLIMUS: CPT

## 2022-10-14 PROCEDURE — 93975 VASCULAR STUDY: CPT

## 2022-10-14 PROCEDURE — 85025 COMPLETE CBC W/AUTO DIFF WBC: CPT

## 2022-10-14 PROCEDURE — 76705 ECHO EXAM OF ABDOMEN: CPT | Mod: XS

## 2022-10-14 PROCEDURE — 93306 TTE W/DOPPLER COMPLETE: CPT | Mod: 26 | Performed by: INTERNAL MEDICINE

## 2022-10-14 PROCEDURE — 99215 OFFICE O/P EST HI 40 MIN: CPT

## 2022-10-14 PROCEDURE — 85045 AUTOMATED RETICULOCYTE COUNT: CPT

## 2022-10-14 RX ORDER — HEPARIN SODIUM (PORCINE) LOCK FLUSH IV SOLN 100 UNIT/ML 100 UNIT/ML
5 SOLUTION INTRAVENOUS
Status: COMPLETED | OUTPATIENT
Start: 2022-10-14 | End: 2022-10-14

## 2022-10-14 RX ADMIN — Medication 5 ML: at 12:22

## 2022-10-14 ASSESSMENT — PAIN SCALES - GENERAL: PAINLEVEL: NO PAIN (0)

## 2022-10-14 NOTE — PROGRESS NOTES
This is a recent snapshot of the patient's [facility] medical record.  For current drug dose and complete information and questions, call [phone] or In Basket pool, [facility] ([id number])  CSN Number:  [id number]

## 2022-10-14 NOTE — LETTER
Date:October 15, 2022      Provider requested that no letter be sent. Do not send.       [facility]

## 2022-10-14 NOTE — NURSING NOTE
"Chief Complaint   Patient presents with     Port Draw     Labs drawn from port by rn.  VS taken.     Port accessed with 20 gauge 3/4\" gripper needle and labs drawn by rn.  Port flushed with NS and heparin then de-accessed.  Pt tolerated well.  VS taken.  Pt checked in for next appt.    Argenis Watkins RN    "

## 2022-10-14 NOTE — LETTER
10/14/2022         RE: Nik Nava  [id number] [facility] [id number]        Dear Colleague,    Thank you for referring your patient, Nik Nava, to the [facility]PL[facility] [place]. Please see a copy of my visit note below.      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    HPI:   Nik returns for follow up. Eyes are more painful, what felt like specks of dust now feels like a larger ball of sand. He denies any new mouth sores. No tendon pain, no difficulty with tight joints or decreased mobility. He denies rashes, no N/V/D, no abd cramps. No bleeding, no  concerns.        Review of Systems                                                                                                                                         10 point Review of systems was negative     PHYSICAL EXAM                                                                                                                                                 KPS: 80  BP (!) 147/68 (BP Location: Right arm, Patient Position: Sitting, Cuff Size: Adult Large)   Pulse 97   Temp 97.4  F (36.3  C) (Oral)   Resp 16   Wt 114.2 kg (251 lb 11.2 oz)   SpO2 98%   BMI 33.22 kg/m        Wt Readings from Last 4 Encounters:   10/14/22 114.2 kg (251 lb 11.2 oz)   10/11/22 116.8 kg (257 lb 6.4 oz)   09/20/22 110.7 kg (244 lb)   09/15/22 113.2 kg (249 lb 8 oz)      General: NAD.  HEENT: sclera anicteric, injected conjunctive a with erythema and no discharge. Squinting. Oropharynx with mild lichenoid changes on bilateral inner cheeks, <25% lichenoid changes and erythema, 1-2 shallow ulcerations on right buccal mucosa  Lungs:  CTA b/l  no wheezes  CV: RRR  no gallop  Abd; soft no tenderness; BS nl   Ext/ Skin: Hyperpigmentation noted on torso, back and anterior pelvis.  LE Trace edema, no skin thickening.  Access:  port-a-cath      Labs:   Lab Results   Component Value Date    WBC 7.1 10/14/2022    WBC 8.9 07/08/2021     Lab Results   Component Value Date    RBC 3.41 10/14/2022    RBC 2.53 07/08/2021     Lab Results   Component Value Date    HGB 11.9 10/14/2022    HGB 8.5 07/08/2021     Lab Results   Component Value Date    HCT 33.1 10/14/2022    HCT 25.9 07/08/2021     Lab Results   Component Value Date    MCV 97 10/14/2022     07/08/2021     Lab Results   Component Value Date    MCH 34.9 10/14/2022    MCH 33.6 07/08/2021     Lab Results   Component Value Date    MCHC 36.0 10/14/2022    MCHC 32.8 07/08/2021     Lab Results   Component Value Date    RDW 15.2 10/14/2022    RDW 19.6 07/08/2021     Lab Results   Component Value Date    PLT 77 10/14/2022     07/08/2021     Last Comprehensive Metabolic Panel:  Sodium   Date Value Ref Range Status   10/14/2022 137 136 - 145 mmol/L Final   07/08/2021 139 133 - 144 mmol/L Final     Potassium   Date Value Ref Range Status   10/14/2022 4.7 3.4 - 5.3 mmol/L Final     Comment:     Specimen slightly hemolyzed, potassium may be falsely elevated.   10/03/2022 4.8 3.4 - 5.3 mmol/L Final     Comment:     Specimen slightly hemolyzed, potassium may be falsely elevated.   07/08/2021 3.9 3.4 - 5.3 mmol/L Final     Chloride   Date Value Ref Range Status   10/14/2022 103 98 - 107 mmol/L Final   10/03/2022 105 94 - 109 mmol/L Final   07/08/2021 108 94 - 109 mmol/L Final     Carbon Dioxide   Date Value Ref Range Status   07/08/2021 23 20 - 32 mmol/L Final     Carbon Dioxide (CO2)   Date Value Ref Range Status   10/14/2022 22 22 - 29 mmol/L Final   10/03/2022 25 20 - 32 mmol/L Final     Anion Gap   Date Value Ref Range Status   10/14/2022 12 7 - 15 mmol/L Final   10/03/2022 6 3 - 14 mmol/L Final   07/08/2021 7 3 - 14 mmol/L Final     Glucose   Date Value Ref Range Status   10/14/2022 272 (H) 70 - 99 mg/dL Final   10/03/2022 108 (H) 70 - 99 mg/dL Final   07/08/2021 107 (H) 70 - 99 mg/dL  Final     Urea Nitrogen   Date Value Ref Range Status   10/14/2022 42.0 (H) 8.0 - 23.0 mg/dL Final   10/03/2022 45 (H) 7 - 30 mg/dL Final   07/08/2021 24 7 - 30 mg/dL Final     Creatinine   Date Value Ref Range Status   10/14/2022 1.16 0.67 - 1.17 mg/dL Final   07/08/2021 0.94 0.66 - 1.25 mg/dL Final     GFR Estimate   Date Value Ref Range Status   10/14/2022 70 >60 mL/min/1.73m2 Final     Comment:     Effective December 21, 2021 eGFRcr in adults is calculated using the 2021 CKD-EPI creatinine equation which includes age and gender (Omar et al., NEJ, DOI: 10.1056/IXBXrv5949133)   07/08/2021 86 >60 mL/min/[1.73_m2] Final     Comment:     Non  GFR Calc  Starting 12/18/2018, serum creatinine based estimated GFR (eGFR) will be   calculated using the Chronic Kidney Disease Epidemiology Collaboration   (CKD-EPI) equation.       Calcium   Date Value Ref Range Status   10/14/2022 10.0 8.8 - 10.2 mg/dL Final   07/08/2021 8.9 8.5 - 10.1 mg/dL Final       cGVHD therapy started on February 24 2022  - Baseline NIH scoring 2/24/2022 : skin 2 maculopapular rash/erythema with no sclerotic features, mouth score 1 mild symptoms with lichenoid changes less than 25%, eyes score 2 moderate symptoms without new visual impairments due to KCS requiring lubricant eyedrops more than 3 times a day, overall mild scale for her provider  - 3/25/2022 1 month NIH treatment assessment on PQRST: skin 2, mouth score 1 mild symptoms with NO lichenoid changes, eyes score 2 moderate symptoms w requiring lubricant eyedrops more than 3 times a day, liver score 1 ALT 3.7x ULN and nl bilirubin   - 3/31  Mouth clear; eyes minimal LFT still abn; no joint or new GI sx  - 4/28 Mouth clear, Left>R eye is mild sclera erythema, lids are pink and irritated appearing, LFT's slow improvement, no new joint, skin, or GI symptoms.   -5/11 mouth with mild erythema but no lichenoid changes, eyes using eyedrops 4-6 times a day score of 2, LFTs  significantly improved from baseline slight elevation in alk phos and AST with normal bilirubin, skin with hyperpigmentation from old acute GVHD no active skin rashes, no GI symptoms no musculoskeletal complaints.  -5/26 Mouth with very slight lichenoid changes, erythema.  Eyes still using eyedrops 4-6x per day.  LFTs essentially normal with slight elevation in alk phos.  Skin with hyperpigmentation from old acute GVHD, no active rashes, no GI or MSK concerns.  Skin 0, mouth 0 but with lichenoid changes, eyes 2  -6/7/22 Mouth with no lichenoid changes, erythema.  Eyes still using eyedrops 4-6x per day.  LFTs essentially normal with slight elevation in alk phos.  Skin with hyperpigmentation from old acute GVHD, no active rashes, no GI or MSK concerns.  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  Acute lymphoblastic leukemia, [place] chromosome positive in CR, MRD neg. S/p allo sib PBSCT. (ABO matched)  HCT-CI score: 3 (prior solid tumor)  Day +100 bone marrow biopsy is 100% donor with no morphologic or flow cytometric evidence of leukemia BCR abl is pending.  - Day +180 restaging BM bx- still in CR  100% donor;  2/16/22 BCR ABL major breakpoint undetectable.   - 1 year restaging 8/18/2022: Markedly hypocellular bone marrow [less than 10% cellular] with maturing trilineage hematopoiesis and no definite morphologic or immunophenotypic evidence for involvement by B lymphoblastic leukemia. BCRABL1 PCR not detected, bone marrow % donor. FISH NORMAL No evidence of BCR-ABL1 fusion  - Maintenance with TKI posttransplantation given his Ph positive ALL that have not been started previously  presumed secondary to multiple active issues specifically infections and COVID.  Per Avila Tobar: will reconsider this patient will think about whether this is something he would like to consider he was previously on Dasatinib, we would start on a low dose and increase as tolerated.  This is on hold now pending active GVHD therapy.    2.  HEME:  - Transfuse for hgb <7g/dL; plt < 10k.  No transfusions needs  - Pulmonary emboli: He developed a pulmonary emboli in the setting of receiving PEG asparaginase (ie provoked thrombus).  Xarelto complete.   - Counts are tolerating valcyte well.     3.  FEN/Renal:  - Cr improved with switching patient to sirolimus briefly, now with new NEGRA on 10/11 with third spacing.  We will give 1 dose of 20 mg of Lasix on 10/12. No significant change. Pt continues to monitor weight at home.  sirolimus toxicity does not respond well to Lasix.  - Magnesium 2.1  - He has a history of renal cancer and resection. Has a single kidney.  *Elevated left arcuate artery resistive indices suggest medical renal disease.    4.    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   #Skin GVHD- history of biopsy proven GVHD of the skin 8/30/2021; resolved.   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 WNL today 10/14  # Ocular GVHD: follows with ophthalmology. Maxitrol to lids, continue refreshing drops QID. Score 2-3  Seen 9/20 by Dr. Juarez, again 10/20 consider serum ggts    1.) Dry Eye OU  -s/p BMT 08/2021  -Worsening dryness right eye>left eye, symptomatic for photophobia/tearing  -Right eye cornea improved with BCL wear (though not in today), left eye stable without sig stain  -Failed: Restasis/Xiidra (stinging), plugs (scarred puncta)  -Currently on: AT 3-10x/day as needed.   -Doing well on BCL with daily oflox right eye, FML bid OU  -Replaced BCL today. Continue current regimen. Air Optix N&D  -Consider monthly replacement of BCL for  now, until eyes have stabilized    2.) Hyperopia/Presbyopia OU  -Previously BCVA 20/20 with correction. Uses low plus readers for distance and higher plus readers for near  -Right eye acuity back to baseline    # Oral GVHD: dex s/s three-4 times a day; tac ointment to lips as needed.  Clotrimazole cream added after seeing dermatology.    Previous therapies  Tacrolimus-previously decided to stay on same dose, no checks of levels if clinically stable, and no need switch to sirolimus. (keep tac low as gvhd is quiet and viremia). 5/10 level 45 with starting of COVID therapy and D-D intractions (Paxlovid for COVID19, which has a drug interaction with tacrolimus-Ritonavir increases serum levels of tacrolimus).   Tacrolimus level subtherapeutic, increase to 2.5 mg twice daily recently, 8/23/2022 instructed to change tacrolimus to 2 mg twice daily. 9/6 with mild NEGRA, hyperkalemia, hypomagnesemia, and reports some tremors and cramps, suspect tacrolimus to be high therefore empirically decreased to 1.5 twice daily, skip morning dose of tacrolimus and took 2 mg today after his afternoon labs. Decrease to 1mg BID on Saturday.  Sirolimus  9/21 despite fluids and a dose adjustment of tacrolimus patient seem to have persistence NORBERTO related NEGRA, therefore after discussion with the patient decision was made to transition to sirolimus that was started on 9/21, patient initially seem to tolerate this well with normalization of kidney function  10/11 presented with flares of ocular and oral GVHD, fluid retention and gain of 5-6 kg, lower extremity edema, abdominal distention, total protein to creatinine ratio elevated at 0.4, in addition to elevation in BUN and creatinine, HTN.  Picture most consistent with sirolimus related side effects.  Less likely that the patient will tolerate even a lower level of sirolimus.  Therefore the patient was instructed to stop sirolimus, last dose on 10/10.  10/14 level 3.5 appropriately trending  down.    Corticosteroids  3/1-3/16: prednisone 100mg every day  3/17 75mg every day   3/31 75 alt with 50  4/6: 75 alt with 25mg every other day  4/12 pred tapered to 60 alternating with 25mg   4/15 In setting of viruses trying to reactivate,GVHD stable: Taper 60/15mg alternating.  4/20 decrease pred further to 50/10.    4/25 taper pred to 50/0 every other day as long as symptoms stable.   5/2 Pred decrease 40/0 alternating every other day.   5/13 decrease to 30/0  5/20 decrease to 20/0 then slowly by 5 mg weekly  6/7 decrease to 10/0  Went up to 15/0 with mild increased LFTs  8/23/2022 increased to 20/0, with persistence of oral ulcers and active ocular GVHD.  Discussed with the patient the importance of stopping chewing of nicotine gums for prolonged hours as that would not help with the healing of the oral ulcers.  I discussed with the patient alternatives of nicotine patches that he will reconsider and let me know.  9/6 decreased to 15 alternating with 0  9/13 decreased to 10 alternating with 0  9/21 discontinued tacrolimus given persistent NEGRA and single kidney and start the patient on sirolimus without loading dose.  We will get a list of possible side effects and adverse events from the Pharm.D. see sirolimus section above.  10/11 GVHD flare. Sirolimus to be stopped. Patient will resume prednisone 40 mg daily as of 10/12, no change with mildly worsening eye pain 10/14    Belumosudil  Patient intolerant/with disease flares on tacrolimus and sirolimus see above.  Discussed with the patient the different options for therapy of chronic GVHD that are FDA approved.  Given the side effect profiles after discussing in detail and given the least nephrotoxicity and expected response, taking into account other metabolic effect as well.  We will proceed with prior authorization with belumosudil.  Patient was given material to review for possible expected adverse events and side effects with both amoxicillin and  ruxolitinib. Not yet approved per pt/pharmacy    5.  ID: Afebrile   #COVID   Positive via homed test 5/8. Treated with Paxlovid with resolution of symptoms.  Had recurrence on 5/18. Resolved  Covid 11/21, 12/21, due for booster will recommend locally but he would like it here     #Pulmonary infiltrates:   4/20 CT chest with new RUL infiltrate. Highly immunocompromised. 4/22 Fungitell/asp GM were neg. BAL 4/29 NGTD, increased micafungin to 150mg IV daily-- f/u 6/1 Dr Garcia CT with mixed results, followed with Dr. Garcia August 2022 with resolution of pulmonary nodules and normalization of LFTs we will switch patient will switch patient to posaconazole prophylactic dose and monitor LFTs and any infectious concerns closely (wnl stable 10/14)    #CMV viremia:    - CMV viremia up to 1100. 4/15 started valcyte 900mg PO BID. 4/20 CMV <137, 5/10 ND, decreased to 450mg BID 4/30 - continue 4 weeks as long as CMV <137 till 5/28 + weekly CMV  - Switch to acyclovir after completion of current therapy 5/28. 10/11 CMV ND. Check monthly on IST    - PPx:   ACV  Posaconazole (previously on micafungin with LFts abl, hx of pulmonary nodules needign treatment dose). Pentamidine, last 9/20, we will schedule 10/25  Azithro 250mg daily (stopped levaquin due to possible tendonitis).     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 S/p 4/22 and 5/4/22 rituximab 375mg/m2 5/10 ND x2, 6/7 ND, 8/18/2022 971, 10/11 ND, check every other month on IST  S/p covid vaccine series 12/2021  S/p evusheld   Deferred annual vaccines in the setting of GVHD and immunosuppression therapy    6. Endo: Hx of graves disease; On synthroid 175 mcg daily. TSH normal 4/6/2022 no reflex to T4.     7. CV: Norvasc: 5mg     8. GI:   -Omeprazole for heartburn  -LFTs as above, now normal.   9/6 decreased ursodiol to daily, monitor, posaconazole level from August 2022 within normal.    Will defer starting statins for hyperlipidemia given recent transaminitis and  start of posaconazole, will revisit and initiate appropriate therapy.      9. Psych:   - Situational anxiety - lexapro 10mg daily.   - Insomnia: worse on steroids. Ativan, trazadone, melatonin    10. MSK: left food drop, PT. Occasional muscle cramps discussed optimizing vitamin D levels and considering vitamin D, some of the symptomatology of muscle cramps are likely related to chronic GVHD.      11. HCM/Age appropriate cancer screening:  -Discussed with the patient importance of age-appropriate cancer screening including colonoscopies, he is due for this.  -Discussed importance of at least once a year vitamin D level check, 8/18/2022 wnl  -Screening for secondary iron overload, 8/2022 ferritin 1023 we will recheck in 2-3 months  -Yearly TSH 2022 wnl; yearly lipid panel - see GI section above  -9/6/2022 DEXA scan Based on BMD diagnosis is consistent with normal bone density based on WHO criteria Ref. 1   -PFTs 9/20, see above  -Metabolic other, 8/2022 testosterone within normal      Plan:    10/11 GVHD flare, 10/14 continues on 40mg pred daily as eyes slightly worse than previous visit  Check ferritin 10/18  Consider serum eye drops with ophtho 10/20, and/or teaching for wife to place contacts  Try goggles to minimize wind irritation and sensation of eye dryness  Colonscopy not discussed today    Schedule pentamidine 10/25  Messaged schedulers re [facility] nephrology (not yet scheduled)  Messaged pharmacy re Belumosudil coverage    Follow-up with Avila Tobar on Tuesday 10/18     I spent 45 minutes in the care of this patient today, which included time necessary for preparation for the visit, obtaining history, ordering medications/tests/procedures as medically indicated, review of pertinent medical literature, counseling of the patient, communication of recommendations to the care team, and documentation time.    Layla Spencer PAC  [phone]      Again, thank you for allowing me to participate in the care of your patient.         Sincerely,        No name on file

## 2022-10-14 NOTE — PROGRESS NOTES
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    HPI:   Nik returns for follow up. Eyes are more painful, what felt like specks of dust now feels like a larger ball of sand. He denies any new mouth sores. No tendon pain, no difficulty with tight joints or decreased mobility. He denies rashes, no N/V/D, no abd cramps. No bleeding, no  concerns.        Review of Systems                                                                                                                                         10 point Review of systems was negative     PHYSICAL EXAM                                                                                                                                                 KPS: 80  BP (!) 147/68 (BP Location: Right arm, Patient Position: Sitting, Cuff Size: Adult Large)   Pulse 97   Temp 97.4  F (36.3  C) (Oral)   Resp 16   Wt 114.2 kg (251 lb 11.2 oz)   SpO2 98%   BMI 33.22 kg/m        Wt Readings from Last 4 Encounters:   10/14/22 114.2 kg (251 lb 11.2 oz)   10/11/22 116.8 kg (257 lb 6.4 oz)   09/20/22 110.7 kg (244 lb)   09/15/22 113.2 kg (249 lb 8 oz)      General: NAD.  HEENT: sclera anicteric, injected conjunctive a with erythema and no discharge. Squinting. Oropharynx with mild lichenoid changes on bilateral inner cheeks, <25% lichenoid changes and erythema, 1-2 shallow ulcerations on right buccal mucosa  Lungs:  CTA b/l  no wheezes  CV: RRR  no gallop  Abd; soft no tenderness; BS nl   Ext/ Skin: Hyperpigmentation noted on torso, back and anterior pelvis.  LE Trace edema, no skin thickening.  Access: port-a-cath      Labs:   Lab Results   Component Value Date    WBC 7.1 10/14/2022    WBC 8.9 07/08/2021     Lab Results   Component Value Date    RBC 3.41 10/14/2022    RBC 2.53 07/08/2021     Lab Results   Component Value Date    HGB 11.9 10/14/2022    HGB 8.5 07/08/2021     Lab  Results   Component Value Date    HCT 33.1 10/14/2022    HCT 25.9 07/08/2021     Lab Results   Component Value Date    MCV 97 10/14/2022     07/08/2021     Lab Results   Component Value Date    MCH 34.9 10/14/2022    MCH 33.6 07/08/2021     Lab Results   Component Value Date    MCHC 36.0 10/14/2022    MCHC 32.8 07/08/2021     Lab Results   Component Value Date    RDW 15.2 10/14/2022    RDW 19.6 07/08/2021     Lab Results   Component Value Date    PLT 77 10/14/2022     07/08/2021     Last Comprehensive Metabolic Panel:  Sodium   Date Value Ref Range Status   10/14/2022 137 136 - 145 mmol/L Final   07/08/2021 139 133 - 144 mmol/L Final     Potassium   Date Value Ref Range Status   10/14/2022 4.7 3.4 - 5.3 mmol/L Final     Comment:     Specimen slightly hemolyzed, potassium may be falsely elevated.   10/03/2022 4.8 3.4 - 5.3 mmol/L Final     Comment:     Specimen slightly hemolyzed, potassium may be falsely elevated.   07/08/2021 3.9 3.4 - 5.3 mmol/L Final     Chloride   Date Value Ref Range Status   10/14/2022 103 98 - 107 mmol/L Final   10/03/2022 105 94 - 109 mmol/L Final   07/08/2021 108 94 - 109 mmol/L Final     Carbon Dioxide   Date Value Ref Range Status   07/08/2021 23 20 - 32 mmol/L Final     Carbon Dioxide (CO2)   Date Value Ref Range Status   10/14/2022 22 22 - 29 mmol/L Final   10/03/2022 25 20 - 32 mmol/L Final     Anion Gap   Date Value Ref Range Status   10/14/2022 12 7 - 15 mmol/L Final   10/03/2022 6 3 - 14 mmol/L Final   07/08/2021 7 3 - 14 mmol/L Final     Glucose   Date Value Ref Range Status   10/14/2022 272 (H) 70 - 99 mg/dL Final   10/03/2022 108 (H) 70 - 99 mg/dL Final   07/08/2021 107 (H) 70 - 99 mg/dL Final     Urea Nitrogen   Date Value Ref Range Status   10/14/2022 42.0 (H) 8.0 - 23.0 mg/dL Final   10/03/2022 45 (H) 7 - 30 mg/dL Final   07/08/2021 24 7 - 30 mg/dL Final     Creatinine   Date Value Ref Range Status   10/14/2022 1.16 0.67 - 1.17 mg/dL Final   07/08/2021 0.94 0.66  - 1.25 mg/dL Final     GFR Estimate   Date Value Ref Range Status   10/14/2022 70 >60 mL/min/1.73m2 Final     Comment:     Effective December 21, 2021 eGFRcr in adults is calculated using the 2021 CKD-EPI creatinine equation which includes age and gender (Omar et al., [place], DOI: 10.1056/MPIHhd8010959)   07/08/2021 86 >60 mL/min/[1.73_m2] Final     Comment:     Non  GFR Calc  Starting 12/18/2018, serum creatinine based estimated GFR (eGFR) will be   calculated using the Chronic Kidney Disease Epidemiology Collaboration   (CKD-EPI) equation.       Calcium   Date Value Ref Range Status   10/14/2022 10.0 8.8 - 10.2 mg/dL Final   07/08/2021 8.9 8.5 - 10.1 mg/dL Final       cGVHD therapy started on February 24 2022  - Baseline NIH scoring 2/24/2022 : skin 2 maculopapular rash/erythema with no sclerotic features, mouth score 1 mild symptoms with lichenoid changes less than 25%, eyes score 2 moderate symptoms without new visual impairments due to KCS requiring lubricant eyedrops more than 3 times a day, overall mild scale for her provider  - 3/25/2022 1 month NIH treatment assessment on PQRST: skin 2, mouth score 1 mild symptoms with NO lichenoid changes, eyes score 2 moderate symptoms w requiring lubricant eyedrops more than 3 times a day, liver score 1 ALT 3.7x ULN and nl bilirubin   - 3/31  Mouth clear; eyes minimal LFT still abn; no joint or new GI sx  - 4/28 Mouth clear, Left>R eye is mild sclera erythema, lids are pink and irritated appearing, LFT's slow improvement, no new joint, skin, or GI symptoms.   -5/11 mouth with mild erythema but no lichenoid changes, eyes using eyedrops 4-6 times a day score of 2, LFTs significantly improved from baseline slight elevation in alk phos and AST with normal bilirubin, skin with hyperpigmentation from old acute GVHD no active skin rashes, no GI symptoms no musculoskeletal complaints.  -5/26 Mouth with very slight lichenoid changes, erythema.  Eyes still using  eyedrops 4-6x per day.  LFTs essentially normal with slight elevation in alk phos.  Skin with hyperpigmentation from old acute GVHD, no active rashes, no GI or MSK concerns.  Skin 0, mouth 0 but with lichenoid changes, eyes 2  -6/7/22 Mouth with no lichenoid changes, erythema.  Eyes still using eyedrops 4-6x per day.  LFTs essentially normal with slight elevation in alk phos.  Skin with hyperpigmentation from old acute GVHD, no active rashes, no GI or MSK concerns.  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  Acute lymphoblastic leukemia, [place] chromosome positive in CR, MRD neg. S/p allo sib PBSCT. (ABO matched)  HCT-CI score: 3 (prior solid tumor)  Day +100 bone marrow biopsy is 100% donor with no morphologic or flow cytometric evidence of leukemia BCR abl is pending.  - Day +180 restaging BM bx- still in CR  100% donor;  2/16/22 BCR ABL major breakpoint undetectable.   - 1 year restaging 8/18/2022: Markedly hypocellular bone marrow [less than 10% cellular] with maturing trilineage hematopoiesis and no definite morphologic or immunophenotypic evidence for involvement by B lymphoblastic leukemia. BCRABL1 PCR not detected, bone marrow % donor. FISH NORMAL No evidence of BCR-ABL1 fusion  - Maintenance with TKI posttransplantation given his Ph positive ALL that have not been started previously presumed secondary to multiple active issues specifically infections and COVID.  Per Avila Tobar: will reconsider this patient will think about whether this is something he would like to consider he was previously on Dasatinib, we would start on a low dose and increase as tolerated.  This is on  hold now pending active GVHD therapy.    2.  HEME:  - Transfuse for hgb <7g/dL; plt < 10k.  No transfusions needs  - Pulmonary emboli: He developed a pulmonary emboli in the setting of receiving PEG asparaginase (ie provoked thrombus).  Xarelto complete.   - Counts are tolerating valcyte well.     3.  FEN/Renal:  - Cr improved with switching patient to sirolimus briefly, now with new NEGRA on 10/11 with third spacing.  We will give 1 dose of 20 mg of Lasix on 10/12. No significant change. Pt continues to monitor weight at home.  sirolimus toxicity does not respond well to Lasix.  - Magnesium 2.1  - He has a history of renal cancer and resection. Has a single kidney.  *Elevated left arcuate artery resistive indices suggest medical renal disease.    4.    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   #Skin GVHD- history of biopsy proven GVHD of the skin 8/30/2021; resolved.   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 WNL today 10/14  # Ocular GVHD: follows with ophthalmology. Maxitrol to lids, continue refreshing drops QID. Score 2-3  Seen 9/20 by Dr. Juarez, again 10/20 consider serum ggts    1.) Dry Eye OU  -s/p BMT 08/2021  -Worsening dryness right eye>left eye, symptomatic for photophobia/tearing  -Right eye cornea improved with BCL wear (though not in today), left eye stable without sig stain  -Failed: Restasis/Xiidra (stinging), plugs (scarred puncta)  -Currently on: AT 3-10x/day as needed.   -Doing well on BCL with daily oflox right eye, FML bid OU  -Replaced BCL today. Continue current regimen. Air [employer]ix N&D  -Consider monthly replacement of BCL for now, until eyes have stabilized    2.) Hyperopia/Presbyopia OU  -Previously BCVA 20/20 with correction. Uses low plus readers for distance and higher plus readers for near  -Right eye acuity back to baseline    # Oral GVHD: dex s/s three-4 times a day; tac ointment to lips as needed.   Clotrimazole cream added after seeing dermatology.    Previous therapies  Tacrolimus-previously decided to stay on same dose, no checks of levels if clinically stable, and no need switch to sirolimus. (keep tac low as gvhd is quiet and viremia). 5/10 level 45 with starting of COVID therapy and D-D intractions (Paxlovid for COVID19, which has a drug interaction with tacrolimus-Ritonavir increases serum levels of tacrolimus).   Tacrolimus level subtherapeutic, increase to 2.5 mg twice daily recently, 8/23/2022 instructed to change tacrolimus to 2 mg twice daily. 9/6 with mild NEGRA, hyperkalemia, hypomagnesemia, and reports some tremors and cramps, suspect tacrolimus to be high therefore empirically decreased to 1.5 twice daily, skip morning dose of tacrolimus and took 2 mg today after his afternoon labs. Decrease to 1mg BID on Saturday.  Sirolimus  9/21 despite fluids and a dose adjustment of tacrolimus patient seem to have persistence NORBERTO related NEGRA, therefore after discussion with the patient decision was made to transition to sirolimus that was started on 9/21, patient initially seem to tolerate this well with normalization of kidney function  10/11 presented with flares of ocular and oral GVHD, fluid retention and gain of 5-6 kg, lower extremity edema, abdominal distention, total protein to creatinine ratio elevated at 0.4, in addition to elevation in BUN and creatinine, HTN.  Picture most consistent with sirolimus related side effects.  Less likely that the patient will tolerate even a lower level of sirolimus.  Therefore the patient was instructed to stop sirolimus, last dose on 10/10.  10/14 level 3.5 appropriately trending down.    Corticosteroids  3/1-3/16: prednisone 100mg every day  3/17 75mg every day   3/31 75 alt with 50  4/6: 75 alt with 25mg every other day  4/12 pred tapered to 60 alternating with 25mg   4/15 In setting of viruses trying to reactivate,GVHD stable: Taper 60/15mg alternating.  4/20  decrease pred further to 50/10.    4/25 taper pred to 50/0 every other day as long as symptoms stable.   5/2 Pred decrease 40/0 alternating every other day.   5/13 decrease to 30/0  5/20 decrease to 20/0 then slowly by 5 mg weekly  6/7 decrease to 10/0  Went up to 15/0 with mild increased LFTs  8/23/2022 increased to 20/0, with persistence of oral ulcers and active ocular GVHD.  Discussed with the patient the importance of stopping chewing of nicotine gums for prolonged hours as that would not help with the healing of the oral ulcers.  I discussed with the patient alternatives of nicotine patches that he will reconsider and let me know.  9/6 decreased to 15 alternating with 0  9/13 decreased to 10 alternating with 0  9/21 discontinued tacrolimus given persistent NEGRA and single kidney and start the patient on sirolimus without loading dose.  We will get a list of possible side effects and adverse events from the Pharm.D. see sirolimus section above.  10/11 GVHD flare. Sirolimus to be stopped. Patient will resume prednisone 40 mg daily as of 10/12, no change with mildly worsening eye pain 10/14    Belumosudil  Patient intolerant/with disease flares on tacrolimus and sirolimus see above.  Discussed with the patient the different options for therapy of chronic GVHD that are FDA approved.  Given the side effect profiles after discussing in detail and given the least nephrotoxicity and expected response, taking into account other metabolic effect as well.  We will proceed with prior authorization with belumosudil.  Patient was given material to review for possible expected adverse events and side effects with both amoxicillin and ruxolitinib. Not yet approved per pt/pharmacy    5.  ID: Afebrile   #COVID   Positive via homed test 5/8. Treated with Paxlovid with resolution of symptoms.  Had recurrence on 5/18. Resolved  Covid 11/21, 12/21, due for booster will recommend locally but he would like it here     #Pulmonary  infiltrates:   4/20 CT chest with new RUL infiltrate. Highly immunocompromised. 4/22 Fungitell/asp GM were neg. BAL 4/29 NGTD, increased micafungin to 150mg IV daily-- f/u 6/1 Dr Garcia CT with mixed results, followed with Dr. Garcia August 2022 with resolution of pulmonary nodules and normalization of LFTs we will switch patient will switch patient to posaconazole prophylactic dose and monitor LFTs and any infectious concerns closely (wnl stable 10/14)    #CMV viremia:    - CMV viremia up to 1100. 4/15 started valcyte 900mg PO BID. 4/20 CMV <137, 5/10 ND, decreased to 450mg BID 4/30 - continue 4 weeks as long as CMV <137 till 5/28 + weekly CMV  - Switch to acyclovir after completion of current therapy 5/28. 10/11 CMV ND. Check monthly on IST    - PPx:   ACV  Posaconazole (previously on micafungin with LFts abl, hx of pulmonary nodules needign treatment dose). Pentamidine, last 9/20, we will schedule 10/25  Azithro 250mg daily (stopped levaquin due to possible tendonitis).     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 S/p 4/22 and 5/4/22 rituximab 375mg/m2 5/10 ND x2, 6/7 ND, 8/18/2022 971, 10/11 ND, check every other month on IST  S/p covid vaccine series 12/2021  S/p evusheld   Deferred annual vaccines in the setting of GVHD and immunosuppression therapy    6. Endo: Hx of graves disease; On synthroid 175 mcg daily. TSH normal 4/6/2022 no reflex to T4.     7. CV: Norvasc: 5mg     8. GI:   -Omeprazole for heartburn  -LFTs as above, now normal.   9/6 decreased ursodiol to daily, monitor, posaconazole level from August 2022 within normal.    Will defer starting statins for hyperlipidemia given recent transaminitis and start of posaconazole, will revisit and initiate appropriate therapy.      9. Psych:   - Situational anxiety - lexapro 10mg daily.   - Insomnia: worse on steroids. Ativan, trazadone, melatonin    10. MSK: left food drop, PT. Occasional muscle cramps discussed optimizing vitamin D levels and  considering vitamin D, some of the symptomatology of muscle cramps are likely related to chronic GVHD.      11. HCM/Age appropriate cancer screening:  -Discussed with the patient importance of age-appropriate cancer screening including colonoscopies, he is due for this.  -Discussed importance of at least once a year vitamin D level check, 8/18/2022 wnl  -Screening for secondary iron overload, 8/2022 ferritin 1023 we will recheck in 2-3 months  -Yearly TSH 2022 wnl; yearly lipid panel - see GI section above  -9/6/2022 DEXA scan Based on BMD diagnosis is consistent with normal bone density based on WHO criteria Ref. 1   -PFTs 9/20, see above  -Metabolic other, 8/2022 testosterone within normal      Plan:    10/11 GVHD flare, 10/14 continues on 40mg pred daily as eyes slightly worse than previous visit  Check ferritin 10/18  Consider serum eye drops with ophtho 10/20, and/or teaching for wife to place contacts  Try goggles to minimize wind irritation and sensation of eye dryness  Colonscopy not discussed today    Schedule pentamidine 10/25  Messaged schedulers re onco nephrology (not yet scheduled)  Messaged pharmacy re Belumosudil coverage    Follow-up with Avila Tobar on Tuesday 10/18     I spent 45 minutes in the care of this patient today, which included time necessary for preparation for the visit, obtaining history, ordering medications/tests/procedures as medically indicated, review of pertinent medical literature, counseling of the patient, communication of recommendations to the care team, and documentation time.    Layla Spencer PAC  [phone]

## 2022-10-14 NOTE — TELEPHONE ENCOUNTER
Thuy Roldan CPhTOncology Pharmacy Liaison     [facility] [place] [address]  Office: [phone]  Fax: [phone]  [email or web address]

## 2022-10-14 NOTE — TELEPHONE ENCOUNTER
PA Initiation    Medication: Rezurock - Submitted  Insurance Company: [employer] - Phone [phone] Fax [phone]  Pharmacy Filling the Rx:    Filling Pharmacy Phone:    Filling Pharmacy Fax:    Start Date: 10/14/2022        Thuy Roldan CPhTOncology Pharmacy [facility] [address] [address]  Office: [phone]  Fax: [phone]  [email or web address]

## 2022-10-14 NOTE — NURSING NOTE
"Oncology Rooming Note    October 14, 2022 2:49 PM   Nik Nava is a 62 year old male who presents for:    Chief Complaint   Patient presents with     Port Draw     Labs drawn from port by rn.  VS taken.     Oncology Clinic Visit     Status post bone marrow transplant (H)     Initial Vitals: BP (!) 147/68 (BP Location: Right arm, Patient Position: Sitting, Cuff Size: Adult Large)   Pulse 97   Temp 97.4  F (36.3  C) (Oral)   Resp 16   Wt 114.2 kg (251 lb 11.2 oz)   SpO2 98%   BMI 33.22 kg/m   Estimated body mass index is 33.22 kg/m  as calculated from the following:    Height as of 10/11/22: 1.854 m (6' 0.99\").    Weight as of this encounter: 114.2 kg (251 lb 11.2 oz). Body surface area is 2.43 meters squared.  No Pain (0) Comment: Data Unavailable   No LMP for male patient.  Allergies reviewed: Yes  Medications reviewed: Yes    Medications: Medication refills not needed today.  Pharmacy name entered into Stoot[facility]:    [facility] - [place], [place] - [address] [facility] [place], [place] -  [facility] [phone]    Clinical concerns: Please review  Images, stopped the sirolimus, taking 40 mg/day of prednisone.        Missy Waterman LPN October 14, 2022 2:50 PM              "

## 2022-10-17 ENCOUNTER — DOCUMENTATION ONLY (OUTPATIENT)
Dept: TRANSPLANT | Facility: CLINIC | Age: 62
End: 2022-10-17

## 2022-10-18 ENCOUNTER — APPOINTMENT (OUTPATIENT)
Dept: LAB | Facility: CLINIC | Age: 62
End: 2022-10-18
Attending: INTERNAL MEDICINE
Payer: COMMERCIAL

## 2022-10-18 ENCOUNTER — ONCOLOGY VISIT (OUTPATIENT)
Dept: TRANSPLANT | Facility: CLINIC | Age: 62
End: 2022-10-18
Attending: INTERNAL MEDICINE
Payer: COMMERCIAL

## 2022-10-18 VITALS
WEIGHT: 260 LBS | OXYGEN SATURATION: 98 % | RESPIRATION RATE: 16 BRPM | SYSTOLIC BLOOD PRESSURE: 143 MMHG | HEART RATE: 99 BPM | TEMPERATURE: 97.6 F | BODY MASS INDEX: 34.31 KG/M2 | DIASTOLIC BLOOD PRESSURE: 76 MMHG

## 2022-10-18 DIAGNOSIS — C91.01 ACUTE LYMPHOBLASTIC LEUKEMIA (ALL) IN REMISSION (H): ICD-10-CM

## 2022-10-18 DIAGNOSIS — R79.89 ELEVATED SERUM CREATININE: ICD-10-CM

## 2022-10-18 DIAGNOSIS — Z94.81 S/P BONE MARROW TRANSPLANT (H): Primary | ICD-10-CM

## 2022-10-18 DIAGNOSIS — D89.813 GVHD AS COMPLICATION OF BONE MARROW TRANSPLANT (H): ICD-10-CM

## 2022-10-18 DIAGNOSIS — E03.9 HYPOTHYROIDISM, UNSPECIFIED TYPE: ICD-10-CM

## 2022-10-18 DIAGNOSIS — T86.09 GVHD AS COMPLICATION OF BONE MARROW TRANSPLANT (H): ICD-10-CM

## 2022-10-18 LAB
ALBUMIN MFR UR ELPH: 13 MG/DL
ALBUMIN SERPL BCG-MCNC: 3.9 G/DL (ref 3.5–5.2)
ALBUMIN UR-MCNC: NEGATIVE MG/DL
ALP SERPL-CCNC: 120 U/L (ref 40–129)
ALT SERPL W P-5'-P-CCNC: 43 U/L (ref 10–50)
ANION GAP SERPL CALCULATED.3IONS-SCNC: 11 MMOL/L (ref 7–15)
APPEARANCE UR: CLEAR
AST SERPL W P-5'-P-CCNC: 42 U/L (ref 10–50)
BACTERIA #/AREA URNS HPF: ABNORMAL /HPF
BASOPHILS # BLD AUTO: 0 10E3/UL (ref 0–0.2)
BASOPHILS NFR BLD AUTO: 0 %
BILIRUB SERPL-MCNC: 0.6 MG/DL
BILIRUB UR QL STRIP: NEGATIVE
BUN SERPL-MCNC: 41.3 MG/DL (ref 8–23)
CALCIUM SERPL-MCNC: 9.8 MG/DL (ref 8.8–10.2)
CHLORIDE SERPL-SCNC: 100 MMOL/L (ref 98–107)
COLOR UR AUTO: YELLOW
CREAT SERPL-MCNC: 1.24 MG/DL (ref 0.67–1.17)
CREAT UR-MCNC: 33.5 MG/DL
DEPRECATED HCO3 PLAS-SCNC: 24 MMOL/L (ref 22–29)
EOSINOPHIL # BLD AUTO: 0 10E3/UL (ref 0–0.7)
EOSINOPHIL NFR BLD AUTO: 0 %
ERYTHROCYTE [DISTWIDTH] IN BLOOD BY AUTOMATED COUNT: 15.2 % (ref 10–15)
FERRITIN SERPL-MCNC: 2348 NG/ML (ref 31–409)
GFR SERPL CREATININE-BSD FRML MDRD: 65 ML/MIN/1.73M2
GLUCOSE SERPL-MCNC: 137 MG/DL (ref 70–99)
GLUCOSE UR STRIP-MCNC: NEGATIVE MG/DL
HCT VFR BLD AUTO: 32.7 % (ref 40–53)
HGB BLD-MCNC: 11.3 G/DL (ref 13.3–17.7)
HGB UR QL STRIP: NEGATIVE
IMM GRANULOCYTES # BLD: 0.3 10E3/UL
IMM GRANULOCYTES NFR BLD: 3 %
KETONES UR STRIP-MCNC: NEGATIVE MG/DL
LEUKOCYTE ESTERASE UR QL STRIP: NEGATIVE
LYMPHOCYTES # BLD AUTO: 4.2 10E3/UL (ref 0.8–5.3)
LYMPHOCYTES NFR BLD AUTO: 37 %
MCH RBC QN AUTO: 33.7 PG (ref 26.5–33)
MCHC RBC AUTO-ENTMCNC: 34.6 G/DL (ref 31.5–36.5)
MCV RBC AUTO: 98 FL (ref 78–100)
MONOCYTES # BLD AUTO: 0.6 10E3/UL (ref 0–1.3)
MONOCYTES NFR BLD AUTO: 5 %
NEUTROPHILS # BLD AUTO: 6.3 10E3/UL (ref 1.6–8.3)
NEUTROPHILS NFR BLD AUTO: 55 %
NITRATE UR QL: NEGATIVE
NRBC # BLD AUTO: 0.2 10E3/UL
NRBC BLD AUTO-RTO: 2 /100
PH UR STRIP: 7 [PH] (ref 5–7)
PHOSPHATE SERPL-MCNC: 2 MG/DL (ref 2.5–4.5)
PLATELET # BLD AUTO: 112 10E3/UL (ref 150–450)
POTASSIUM SERPL-SCNC: 4.8 MMOL/L (ref 3.4–5.3)
PROT SERPL-MCNC: 6.4 G/DL (ref 6.4–8.3)
PROT/CREAT 24H UR: 0.39 MG/MG CR (ref 0–0.2)
PTH-INTACT SERPL-MCNC: 60 PG/ML (ref 15–65)
RBC # BLD AUTO: 3.35 10E6/UL (ref 4.4–5.9)
RBC URINE: 0 /HPF
SODIUM SERPL-SCNC: 135 MMOL/L (ref 136–145)
SP GR UR STRIP: 1.01 (ref 1–1.03)
T4 FREE SERPL-MCNC: 1.51 NG/DL (ref 0.9–1.7)
TSH SERPL DL<=0.005 MIU/L-ACNC: 0.03 UIU/ML (ref 0.3–4.2)
UROBILINOGEN UR STRIP-MCNC: NORMAL MG/DL
WBC # BLD AUTO: 11.4 10E3/UL (ref 4–11)
WBC URINE: 1 /HPF

## 2022-10-18 PROCEDURE — 81001 URINALYSIS AUTO W/SCOPE: CPT | Performed by: INTERNAL MEDICINE

## 2022-10-18 PROCEDURE — 84100 ASSAY OF PHOSPHORUS: CPT | Performed by: INTERNAL MEDICINE

## 2022-10-18 PROCEDURE — 94640 AIRWAY INHALATION TREATMENT: CPT | Performed by: INTERNAL MEDICINE

## 2022-10-18 PROCEDURE — 82040 ASSAY OF SERUM ALBUMIN: CPT | Performed by: INTERNAL MEDICINE

## 2022-10-18 PROCEDURE — 82728 ASSAY OF FERRITIN: CPT | Performed by: INTERNAL MEDICINE

## 2022-10-18 PROCEDURE — 94642 AEROSOL INHALATION TREATMENT: CPT | Performed by: INTERNAL MEDICINE

## 2022-10-18 PROCEDURE — 84439 ASSAY OF FREE THYROXINE: CPT | Performed by: INTERNAL MEDICINE

## 2022-10-18 PROCEDURE — 250N000011 HC RX IP 250 OP 636: Performed by: INTERNAL MEDICINE

## 2022-10-18 PROCEDURE — 80053 COMPREHEN METABOLIC PANEL: CPT | Performed by: INTERNAL MEDICINE

## 2022-10-18 PROCEDURE — 84481 FREE ASSAY (FT-3): CPT | Performed by: INTERNAL MEDICINE

## 2022-10-18 PROCEDURE — 99215 OFFICE O/P EST HI 40 MIN: CPT | Performed by: INTERNAL MEDICINE

## 2022-10-18 PROCEDURE — 84156 ASSAY OF PROTEIN URINE: CPT | Performed by: INTERNAL MEDICINE

## 2022-10-18 PROCEDURE — 36591 DRAW BLOOD OFF VENOUS DEVICE: CPT | Performed by: INTERNAL MEDICINE

## 2022-10-18 PROCEDURE — G0463 HOSPITAL OUTPT CLINIC VISIT: HCPCS

## 2022-10-18 PROCEDURE — 84443 ASSAY THYROID STIM HORMONE: CPT | Performed by: INTERNAL MEDICINE

## 2022-10-18 PROCEDURE — 83970 ASSAY OF PARATHORMONE: CPT | Performed by: INTERNAL MEDICINE

## 2022-10-18 PROCEDURE — 85025 COMPLETE CBC W/AUTO DIFF WBC: CPT | Performed by: INTERNAL MEDICINE

## 2022-10-18 RX ORDER — HEPARIN SODIUM (PORCINE) LOCK FLUSH IV SOLN 100 UNIT/ML 100 UNIT/ML
5 SOLUTION INTRAVENOUS ONCE
Status: COMPLETED | OUTPATIENT
Start: 2022-10-18 | End: 2022-10-18

## 2022-10-18 RX ORDER — PREDNISONE 5 MG/1
5 TABLET ORAL DAILY
Qty: 60 TABLET | Refills: 3 | Status: SHIPPED | OUTPATIENT
Start: 2022-10-18 | End: 2022-10-25

## 2022-10-18 RX ADMIN — Medication 5 ML: at 14:14

## 2022-10-18 ASSESSMENT — PAIN SCALES - GENERAL: PAINLEVEL: NO PAIN (0)

## 2022-10-18 NOTE — PROGRESS NOTES
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    HPI:   Nik returns for follow up. Eyes are better/stable, mouth better less painful- no new ulcers. No tendon pain, no difficulty with tight joints or decreased mobility. He denies rashes, no N/V/D, no abd cramps. No bleeding, no  concerns. He is now off sirolimus due to intolerance.  Lower extremity edema appears stable overall.  He has been measuring his weights daily in the morning.  He denies any shortness of breath or chest pain no respiratory symptoms otherwise.  Images completed recently.        Review of Systems                                                                                                                                         10 point Review of systems was negative except as detailed above     PHYSICAL EXAM                                                                                                                                                 KPS: 80  BP (!) 141/75   Pulse 99   Temp 97.6  F (36.4  C)   Resp 16   Wt 117.9 kg (260 lb)   SpO2 98%   BMI 34.31 kg/m        Wt Readings from Last 4 Encounters:   10/18/22 117.9 kg (260 lb)   10/14/22 114.2 kg (251 lb 11.2 oz)   10/11/22 116.8 kg (257 lb 6.4 oz)   09/20/22 110.7 kg (244 lb)      General: NAD.  HEENT: sclera anicteric, injected conjunctive with erythema and no discharge. Squinting. Oropharynx with mild lichenoid changes on bilateral inner cheeks, <25% lichenoid changes and erythema, 2-3 shallow ulcerations on right buccal mucosa surrounded by otherwise healthy appearing mucosa  Lungs:  CTA b/l  no wheezes  CV: RRR  no gallop  Abd; soft no tenderness; BS nl   Ext/ Skin: Hyperpigmentation noted on torso, back and anterior pelvis.  LE Trace edema, no skin thickening.  Access: port-a-cath      Labs: Last Comprehensive Metabolic Panel:  Sodium   Date Value Ref Range Status    10/14/2022 137 136 - 145 mmol/L Final   07/08/2021 139 133 - 144 mmol/L Final     Potassium   Date Value Ref Range Status   10/14/2022 4.7 3.4 - 5.3 mmol/L Final     Comment:     Specimen slightly hemolyzed, potassium may be falsely elevated.   10/03/2022 4.8 3.4 - 5.3 mmol/L Final     Comment:     Specimen slightly hemolyzed, potassium may be falsely elevated.   07/08/2021 3.9 3.4 - 5.3 mmol/L Final     Chloride   Date Value Ref Range Status   10/14/2022 103 98 - 107 mmol/L Final   10/03/2022 105 94 - 109 mmol/L Final   07/08/2021 108 94 - 109 mmol/L Final     Carbon Dioxide   Date Value Ref Range Status   07/08/2021 23 20 - 32 mmol/L Final     Carbon Dioxide (CO2)   Date Value Ref Range Status   10/14/2022 22 22 - 29 mmol/L Final   10/03/2022 25 20 - 32 mmol/L Final     Anion Gap   Date Value Ref Range Status   10/14/2022 12 7 - 15 mmol/L Final   10/03/2022 6 3 - 14 mmol/L Final   07/08/2021 7 3 - 14 mmol/L Final     Glucose   Date Value Ref Range Status   10/14/2022 272 (H) 70 - 99 mg/dL Final   10/03/2022 108 (H) 70 - 99 mg/dL Final   07/08/2021 107 (H) 70 - 99 mg/dL Final     Urea Nitrogen   Date Value Ref Range Status   10/14/2022 42.0 (H) 8.0 - 23.0 mg/dL Final   10/03/2022 45 (H) 7 - 30 mg/dL Final   07/08/2021 24 7 - 30 mg/dL Final     Creatinine   Date Value Ref Range Status   10/14/2022 1.16 0.67 - 1.17 mg/dL Final   07/08/2021 0.94 0.66 - 1.25 mg/dL Final     GFR Estimate   Date Value Ref Range Status   10/14/2022 70 >60 mL/min/1.73m2 Final     Comment:     Effective December 21, 2021 eGFRcr in adults is calculated using the 2021 CKD-EPI creatinine equation which includes age and gender ( et al., NEJM, DOI: 10.1056/AMLZxt9106041)   07/08/2021 86 >60 mL/min/[1.73_m2] Final     Comment:     Non  GFR Calc  Starting 12/18/2018, serum creatinine based estimated GFR (eGFR) will be   calculated using the Chronic Kidney Disease Epidemiology Collaboration   (CKD-EPI) equation.       Calcium    Date Value Ref Range Status   10/14/2022 10.0 8.8 - 10.2 mg/dL Final   07/08/2021 8.9 8.5 - 10.1 mg/dL Final       cGVHD therapy started on February 24 2022  - Baseline NIH scoring 2/24/2022 : skin 2 maculopapular rash/erythema with no sclerotic features, mouth score 1 mild symptoms with lichenoid changes less than 25%, eyes score 2 moderate symptoms without new visual impairments due to KCS requiring lubricant eyedrops more than 3 times a day, overall mild scale for her provider  - 3/25/2022 1 month NIH treatment assessment on PQRST: skin 2, mouth score 1 mild symptoms with NO lichenoid changes, eyes score 2 moderate symptoms w requiring lubricant eyedrops more than 3 times a day, liver score 1 ALT 3.7x ULN and nl bilirubin   - 3/31  Mouth clear; eyes minimal LFT still abn; no joint or new GI sx  - 4/28 Mouth clear, Left>R eye is mild sclera erythema, lids are pink and irritated appearing, LFT's slow improvement, no new joint, skin, or GI symptoms.   -5/11 mouth with mild erythema but no lichenoid changes, eyes using eyedrops 4-6 times a day score of 2, LFTs significantly improved from baseline slight elevation in alk phos and AST with normal bilirubin, skin with hyperpigmentation from old acute GVHD no active skin rashes, no GI symptoms no musculoskeletal complaints.  -5/26 Mouth with very slight lichenoid changes, erythema.  Eyes still using eyedrops 4-6x per day.  LFTs essentially normal with slight elevation in alk phos.  Skin with hyperpigmentation from old acute GVHD, no active rashes, no GI or MSK concerns.  Skin 0, mouth 0 but with lichenoid changes, eyes 2  -6/7/22 Mouth with no lichenoid changes, erythema.  Eyes still using eyedrops 4-6x per day.  LFTs essentially normal with slight elevation in alk phos.  Skin with hyperpigmentation from old acute GVHD, no active rashes, no GI or MSK concerns.  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 S/p allo sib PBSCT. (ABO matched)  HCT-CI score: 3 (prior solid tumor)  Day +100 bone marrow biopsy is 100% donor with no morphologic or flow cytometric evidence of leukemia BCR abl is pending.  - Day +180 restaging BM bx- still in CR  100% donor;  2/16/22 BCR ABL major breakpoint undetectable.   - 1 year restaging 8/18/2022: Markedly hypocellular bone marrow [less than 10% cellular] with maturing trilineage hematopoiesis and no definite morphologic or immunophenotypic evidence for involvement by B lymphoblastic leukemia. BCRABL1 PCR not detected, bone marrow % donor. FISH NORMAL No evidence of BCR-ABL1 fusion  - Maintenance with TKI posttransplantation given his Ph positive ALL that have not been started previously presumed secondary to multiple active issues specifically infections and COVID.  Per Avila Tobar: will reconsider this patient will think about whether this is something he would like to consider he was previously on Dasatinib, we would start on a low dose and increase as tolerated.  This is on hold now pending active GVHD therapy, we discussed on this visit 10/18 in agreement with holding off.    2.  HEME:  - Pulmonary emboli: He developed a pulmonary emboli in the setting of receiving PEG asparaginase (ie provoked thrombus).  Xarelto complete.   -At 1 year anniversary visit completed screening for secondary iron overload, 8/2022 ferritin 1023, recheck on 10/18 increased to 2348, expect this is acute phase reactant with recent GVHD flare.  We will recheck in 1 month with full iron panel    3.  FEN/Renal:  - Cr improved with switching patient to sirolimus  briefly, now with new NEGRA on 10/11 with third spacing.  We will give 1 dose of 20 mg of Lasix on 10/12. No significant change. Pt continues to monitor weight at home sirolimus toxicity does not respond well to Lasix.  -Total protein to creatinine ratio 0.41 on 10/11 recheck 0.39 on 10/18  - Magnesium 2.1  - He has a history of renal cancer and resection. Has a single kidney.  *Elevated left arcuate artery resistive indices suggest medical renal disease.    4.    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   #Skin GVHD- history of biopsy proven GVHD of the skin 8/30/2021; resolved.   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 WNL today 10/14  # Ocular GVHD: follows with ophthalmology. Maxitrol to lids, continue refreshing drops QID. Score 3  Seen 9/20 by Dr. Juarez, scheduled to be seen 10/20     1.) Dry Eye OU  -s/p BMT 08/2021  -Worsening dryness right eye>left eye, symptomatic for photophobia/tearing  -Right eye cornea improved with BCL wear (though not in today), left eye stable without sig stain  -Failed: Restasis/Xiidra (stinging), plugs (scarred puncta)  -Currently on: AT 3-10x/day as needed.   -Doing well on BCL with daily oflox right eye, FML bid OU  -Replaced BCL today. Continue current regimen. Air Optix N&D  -Consider monthly replacement of BCL for now, until eyes have stabilized    2.) Hyperopia/Presbyopia OU  -Previously BCVA 20/20 with correction. Uses low plus readers for distance and higher plus readers for near  -Right eye acuity back to baseline    # Oral GVHD: dex s/s three-4 times a day; tac ointment to lips as needed.  Clotrimazole cream added after seeing dermatology.    Previous therapies  Tacrolimus-previously decided to stay on same dose, no checks of levels if clinically stable, and no need switch to sirolimus. (keep tac low as gvhd is quiet and viremia). 5/10 level 45 with starting of COVID therapy and D-D intractions  (Paxlovid for COVID19, which has a drug interaction with tacrolimus-Ritonavir increases serum levels of tacrolimus).   Tacrolimus level subtherapeutic, increase to 2.5 mg twice daily recently, 8/23/2022 instructed to change tacrolimus to 2 mg twice daily. 9/6 with mild NEGRA, hyperkalemia, hypomagnesemia, and reports some tremors and cramps, suspect tacrolimus to be high therefore empirically decreased to 1.5 twice daily, skip morning dose of tacrolimus and took 2 mg today after his afternoon labs. Decrease to 1mg BID on Saturday.  Sirolimus  9/21 despite fluids and a dose adjustment of tacrolimus patient seem to have persistence NORBERTO related NEGRA, therefore after discussion with the patient decision was made to transition to sirolimus that was started on 9/21, patient initially seem to tolerate this well with normalization of kidney function  10/11 presented with flares of ocular and oral GVHD, fluid retention and gain of 5-6 kg, lower extremity edema, abdominal distention, total protein to creatinine ratio elevated at 0.4, in addition to elevation in BUN and creatinine, HTN.  Picture most consistent with sirolimus related side effects.  Less likely that the patient will tolerate even a lower level of sirolimus.  Therefore the patient was instructed to stop sirolimus, last dose on 10/10.  10/14 level 3.5 appropriately trending down.    Corticosteroids  3/1-3/16: prednisone 100mg every day  3/17 75mg every day   3/31 75 alt with 50  4/6: 75 alt with 25mg every other day  4/12 pred tapered to 60 alternating with 25mg   4/15 In setting of viruses trying to reactivate,GVHD stable: Taper 60/15mg alternating.  4/20 decrease pred further to 50/10.    4/25 taper pred to 50/0 every other day as long as symptoms stable.   5/2 Pred decrease 40/0 alternating every other day.   5/13 decrease to 30/0  5/20 decrease to 20/0 then slowly by 5 mg weekly  6/7 decrease to 10/0  Went up to 15/0 with mild increased LFTs  8/23/2022 increased  to 20/0, with persistence of oral ulcers and active ocular GVHD.  Discussed with the patient the importance of stopping chewing of nicotine gums for prolonged hours as that would not help with the healing of the oral ulcers.  I discussed with the patient alternatives of nicotine patches that he will reconsider and let me know.  9/6 decreased to 15 alternating with 0  9/13 decreased to 10 alternating with 0  9/21 discontinued tacrolimus given persistent NEGRA and single kidney and start the patient on sirolimus without loading dose.  We will get a list of possible side effects and adverse events from the Pharm.D. see sirolimus section above.  10/11 GVHD flare. Sirolimus to be stopped. Patient will resume prednisone 40 mg daily as of 10/12, no change with mildly worsening eye pain 10/14, today ocular symptoms improved we will continue on same dose to allow time for Belumosudil to show activity    Belumosudil  Patient intolerant/with disease flares on tacrolimus and sirolimus see above.  Discussed with the patient the different options for therapy of chronic GVHD that are FDA approved.  Given the side effect profiles after discussing in detail and given the least nephrotoxicity and expected response, taking into account other metabolic effect as well.  We will proceed with prior authorization with belumosudil.  Patient was given material to review for possible expected adverse events and side effects with both Belumosodil and ruxolitinib.   --- Started on 10/18 in p.m.    5.  ID: Afebrile with no current signs or symptoms of infection  #History of COVID   Positive via homed test 5/8. Treated with Paxlovid with resolution of symptoms.  Had recurrence on 5/18. Resolved  Covid 11/21, 12/21, due for booster will recommend locally but he would like it here     #History of pulmonary infiltrates:   4/20 CT chest with new RUL infiltrate. Highly immunocompromised. 4/22 Fungitell/asp GM were neg. BAL 4/29 NGTD, increased  micafungin to 150mg IV daily-- f/u 6/1 Dr Garcia CT with mixed results, followed with Dr. Garcia August 2022 with resolution of pulmonary nodules and normalization of LFTs we will switch patient will switch patient to posaconazole prophylactic dose and monitor LFTs and any infectious concerns closely (wnl stable 10/14)    #History of CMV viremia:    - CMV viremia up to 1100. 4/15 started valcyte 900mg PO BID. 4/20 CMV <137, 5/10 ND, decreased to 450mg BID 4/30 - continue 4 weeks as long as CMV <137 till 5/28 + weekly CMV  - Switch to acyclovir after completion of current therapy 5/28. 10/11 CMV ND. Check monthly on IST    - PPx:   ACV  Posaconazole (previously on micafungin with LFts abl, hx of pulmonary nodules needign treatment dose). Pentamidine, last 9/20, we will schedule 10/25  Azithro 250mg daily (stopped levaquin due to possible tendonitis).     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 S/p 4/22 and 5/4/22 rituximab 375mg/m2 5/10 ND x2, 6/7 ND, 8/18/2022 971, 10/11 ND, check every other month on IST  S/p covid vaccine series 12/2021  S/p evusheld   Deferred annual vaccines in the setting of GVHD and immunosuppression therapy    6. Endo: Hx of graves disease; On synthroid 175 mcg daily. TSH normal 4/6/2022 no reflex to T4.  Recheck on 10/18 T3 low at 0.03, free T3 and T4 within normal.  Subclinical hyperthyroidism, will discuss with endocrine.     7. CV:   -Hypertension on Norvasc: 5mg, will monitor blood pressure closely see if it normalizes again off sirolimus, could consider Norvasc increased however this is associated with lower extremity edema and other alternatives including ACE inhibitors could be better in the setting of proteinuria, this will be further determined when he follows with nephrology.    8. GI:   -Omeprazole for heartburn  -LFTs as above, now normal.   9/6 decreased ursodiol to daily, monitor, posaconazole level from August 2022 within normal.    Will defer starting statins  for hyperlipidemia given recent transaminitis and start of posaconazole, will revisit and initiate appropriate therapy.      9. Psych:   - Situational anxiety - lexapro 10mg daily.   - Insomnia: worse on steroids. Ativan, trazadone, melatonin    10. MSK: left food drop, PT. Occasional muscle cramps discussed optimizing vitamin D levels and considering vitamin D, some of the symptomatology of muscle cramps are likely related to chronic GVHD.      11. HCM/Age appropriate cancer screening:  -Discussed with the patient importance of age-appropriate cancer screening including colonoscopies, he is due for this.  -Discussed importance of at least once a year vitamin D level check, 8/18/2022 wnl  -Screening for secondary iron overload, 8/2022 ferritin 1023 we will recheck in 2-3 months  -Yearly TSH 2022 wnl; yearly lipid panel - see GI section above  -9/6/2022 DEXA scan Based on BMD diagnosis is consistent with normal bone density based on WHO criteria Ref. 1   -PFTs 9/20, see above  -Metabolic other, 8/2022 testosterone within normal      Plan:    Start Belumosodil 10/18  Continue same dose of steroids for now  Recheck serum ferritin with iron panel 1 month from 10/18  Needs pentamadine on 10/25  We will recheck lipid profile as well on 11/1, patient aware  Follow-up with ophthalmology on 10/20 for further recommendations of topical therapy for optimization of ocular GVHD  Follow-up with nephrology on 11/1 for further evaluation of renal function and proteinuria/hypertension optimal management  Continue to monitor daily a.m. weights  Patient knows to call us with any significant changes or concerns for infections or any questions or concerns  Follow-up with Avila Tobar on Tuesday 11/8     I spent 45 minutes in the care of this patient today, which included time necessary for preparation for the visit, obtaining history, ordering medications/tests/procedures as medically indicated, review of pertinent medical literature,  counseling of the patient, communication of recommendations to the care team, and documentation time.

## 2022-10-18 NOTE — NURSING NOTE
"Oncology Rooming Note    October 18, 2022 2:20 PM   Nik Nava is a 62 year old male who presents for:    Chief Complaint   Patient presents with     Port Draw     Labs drawn by RN via port, vitals taken.     Oncology Clinic Visit     Acute lymphoblastic leukemia      Initial Vitals: BP (!) 141/75   Pulse 99   Temp 97.6  F (36.4  C)   Resp 16   Wt 117.9 kg (260 lb)   SpO2 98%   BMI 34.31 kg/m   Estimated body mass index is 34.31 kg/m  as calculated from the following:    Height as of 10/11/22: 1.854 m (6' 0.99\").    Weight as of this encounter: 117.9 kg (260 lb). Body surface area is 2.46 meters squared.  No Pain (0) Comment: Data Unavailable   No LMP for male patient.  Allergies reviewed: Yes  Medications reviewed: Yes    Medications: Medication refills not needed today.  Pharmacy name entered into TASCET:    [facility] - [place], [place] - [id number] [facility] [place], [place] - [address] [facility] [phone]  [facility]    Clinical concerns: Bilateral swelling on legs, wrist, and face.       Deshawn Chaudhary"

## 2022-10-18 NOTE — LETTER
10/18/2022         RE: Nik Nava  [address] [facility] [id number]        Dear Colleague,    Thank you for referring your patient, Nik Nava, to the [facility] [place]. Please see a copy of my visit note below.      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    HPI:   Nik returns for follow up. Eyes are better/stable, mouth better less painful- no new ulcers. No tendon pain, no difficulty with tight joints or decreased mobility. He denies rashes, no N/V/D, no abd cramps. No bleeding, no  concerns. He is now off sirolimus due to intolerance.  Lower extremity edema appears stable overall.  He has been measuring his weights daily in the morning.  He denies any shortness of breath or chest pain no respiratory symptoms otherwise.  Images completed recently.        Review of Systems                                                                                                                                         10 point Review of systems was negative except as detailed above     PHYSICAL EXAM                                                                                                                                                 KPS: 80  BP (!) 141/75   Pulse 99   Temp 97.6  F (36.4  C)   Resp 16   Wt 117.9 kg (260 lb)   SpO2 98%   BMI 34.31 kg/m        Wt Readings from Last 4 Encounters:   10/18/22 117.9 kg (260 lb)   10/14/22 114.2 kg (251 lb 11.2 oz)   10/11/22 116.8 kg (257 lb 6.4 oz)   09/20/22 110.7 kg (244 lb)      General: NAD.  HEENT: sclera anicteric, injected conjunctive with erythema and no discharge. Squinting. Oropharynx with mild lichenoid changes on bilateral inner cheeks, <25% lichenoid changes and erythema, 2-3 shallow ulcerations on right buccal mucosa surrounded by otherwise healthy appearing mucosa  Lungs:  CTA b/l  no  wheezes  CV: RRR  no gallop  Abd; soft no tenderness; BS nl   Ext/ Skin: Hyperpigmentation noted on torso, back and anterior pelvis.  LE Trace edema, no skin thickening.  Access: port-a-cath      Labs: Last Comprehensive Metabolic Panel:  Sodium   Date Value Ref Range Status   10/14/2022 137 136 - 145 mmol/L Final   07/08/2021 139 133 - 144 mmol/L Final     Potassium   Date Value Ref Range Status   10/14/2022 4.7 3.4 - 5.3 mmol/L Final     Comment:     Specimen slightly hemolyzed, potassium may be falsely elevated.   10/03/2022 4.8 3.4 - 5.3 mmol/L Final     Comment:     Specimen slightly hemolyzed, potassium may be falsely elevated.   07/08/2021 3.9 3.4 - 5.3 mmol/L Final     Chloride   Date Value Ref Range Status   10/14/2022 103 98 - 107 mmol/L Final   10/03/2022 105 94 - 109 mmol/L Final   07/08/2021 108 94 - 109 mmol/L Final     Carbon Dioxide   Date Value Ref Range Status   07/08/2021 23 20 - 32 mmol/L Final     Carbon Dioxide (CO2)   Date Value Ref Range Status   10/14/2022 22 22 - 29 mmol/L Final   10/03/2022 25 20 - 32 mmol/L Final     Anion Gap   Date Value Ref Range Status   10/14/2022 12 7 - 15 mmol/L Final   10/03/2022 6 3 - 14 mmol/L Final   07/08/2021 7 3 - 14 mmol/L Final     Glucose   Date Value Ref Range Status   10/14/2022 272 (H) 70 - 99 mg/dL Final   10/03/2022 108 (H) 70 - 99 mg/dL Final   07/08/2021 107 (H) 70 - 99 mg/dL Final     Urea Nitrogen   Date Value Ref Range Status   10/14/2022 42.0 (H) 8.0 - 23.0 mg/dL Final   10/03/2022 45 (H) 7 - 30 mg/dL Final   07/08/2021 24 7 - 30 mg/dL Final     Creatinine   Date Value Ref Range Status   10/14/2022 1.16 0.67 - 1.17 mg/dL Final   07/08/2021 0.94 0.66 - 1.25 mg/dL Final     GFR Estimate   Date Value Ref Range Status   10/14/2022 70 >60 mL/min/1.73m2 Final     Comment:     Effective December 21, 2021 eGFRcr in adults is calculated using the 2021 CKD-EPI creatinine equation which includes age and gender (Omar et al., NEJ, DOI:  10.1056/KWEPqx3045183)   07/08/2021 86 >60 mL/min/[1.73_m2] Final     Comment:     Non  GFR Calc  Starting 12/18/2018, serum creatinine based estimated GFR (eGFR) will be   calculated using the Chronic Kidney Disease Epidemiology Collaboration   (CKD-EPI) equation.       Calcium   Date Value Ref Range Status   10/14/2022 10.0 8.8 - 10.2 mg/dL Final   07/08/2021 8.9 8.5 - 10.1 mg/dL Final       cGVHD therapy started on February 24 2022  - Baseline NIH scoring 2/24/2022 : skin 2 maculopapular rash/erythema with no sclerotic features, mouth score 1 mild symptoms with lichenoid changes less than 25%, eyes score 2 moderate symptoms without new visual impairments due to KCS requiring lubricant eyedrops more than 3 times a day, overall mild scale for her provider  - 3/25/2022 1 month NIH treatment assessment on PQRST: skin 2, mouth score 1 mild symptoms with NO lichenoid changes, eyes score 2 moderate symptoms w requiring lubricant eyedrops more than 3 times a day, liver score 1 ALT 3.7x ULN and nl bilirubin   - 3/31  Mouth clear; eyes minimal LFT still abn; no joint or new GI sx  - 4/28 Mouth clear, Left>R eye is mild sclera erythema, lids are pink and irritated appearing, LFT's slow improvement, no new joint, skin, or GI symptoms.   -5/11 mouth with mild erythema but no lichenoid changes, eyes using eyedrops 4-6 times a day score of 2, LFTs significantly improved from baseline slight elevation in alk phos and AST with normal bilirubin, skin with hyperpigmentation from old acute GVHD no active skin rashes, no GI symptoms no musculoskeletal complaints.  -5/26 Mouth with very slight lichenoid changes, erythema.  Eyes still using eyedrops 4-6x per day.  LFTs essentially normal with slight elevation in alk phos.  Skin with hyperpigmentation from old acute GVHD, no active rashes, no GI or MSK concerns.  Skin 0, mouth 0 but with lichenoid changes, eyes 2  -6/7/22 Mouth with no lichenoid changes, erythema.  Eyes  still using eyedrops 4-6x per day.  LFTs essentially normal with slight elevation in alk phos.  Skin with hyperpigmentation from old acute GVHD, no active rashes, no GI or MSK concerns.  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  Acute lymphoblastic leukemia, [place] chromosome positive in CR, MRD neg. S/p allo sib PBSCT. (ABO matched)  HCT-CI score: 3 (prior solid tumor)  Day +100 bone marrow biopsy is 100% donor with no morphologic or flow cytometric evidence of leukemia BCR abl is pending.  - Day +180 restaging BM bx- still in CR  100% donor;  2/16/22 BCR ABL major breakpoint undetectable.   - 1 year restaging 8/18/2022: Markedly hypocellular bone marrow [less than 10% cellular] with maturing trilineage hematopoiesis and no definite morphologic or immunophenotypic evidence for involvement by B lymphoblastic leukemia. BCRABL1 PCR not detected, bone marrow % donor. FISH NORMAL No evidence of BCR-ABL1 fusion  - Maintenance with TKI posttransplantation given his Ph positive ALL that have not been started previously presumed secondary to multiple active issues specifically infections and COVID.  Per Avila Tobar: will reconsider this patient will think about whether this is something he would like to consider he was previously on Dasatinib, we would start on a low dose and increase as tolerated.  This is on hold now pending active GVHD therapy, we discussed on this visit 10/18 in agreement with holding off.    2.  HEME:  - Pulmonary emboli: He developed a pulmonary emboli in the setting of receiving PEG asparaginase (ie provoked thrombus).  Xarelto complete.   -At 1 year anniversary  visit completed screening for secondary iron overload, 8/2022 ferritin 1023, recheck on 10/18 increased to 2348, expect this is acute phase reactant with recent GVHD flare.  We will recheck in 1 month with full iron panel    3.  FEN/Renal:  - Cr improved with switching patient to sirolimus briefly, now with new NEGRA on 10/11 with third spacing.  We will give 1 dose of 20 mg of Lasix on 10/12. No significant change. Pt continues to monitor weight at home sirolimus toxicity does not respond well to Lasix.  -Total protein to creatinine ratio 0.41 on 10/11 recheck 0.39 on 10/18  - Magnesium 2.1  - He has a history of renal cancer and resection. Has a single kidney.  *Elevated left arcuate artery resistive indices suggest medical renal disease.    4.    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   #Skin GVHD- history of biopsy proven GVHD of the skin 8/30/2021; resolved.   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 WNL today 10/14  # Ocular GVHD: follows with ophthalmology. Maxitrol to lids, continue refreshing drops QID. Score 3  Seen 9/20 by Dr. Juarez, scheduled to be seen 10/20     1.) Dry Eye OU  -s/p BMT 08/2021  -Worsening dryness right eye>left eye, symptomatic for photophobia/tearing  -Right eye cornea improved with BCL wear (though not in today), left eye stable without sig stain  -Failed: Restasis/Xiidra (stinging), plugs (scarred puncta)  -Currently on: AT 3-10x/day as needed.   -Doing well on BCL with daily oflox right eye, FML bid OU  -Replaced BCL today. Continue current regimen. Air Optix N&D  -Consider monthly replacement of BCL for now, until eyes have stabilized    2.) Hyperopia/Presbyopia OU  -Previously BCVA 20/20 with correction. Uses low plus readers for distance and higher plus readers for near  -Right eye acuity back to baseline    # Oral GVHD: dex s/s three-4 times a day; tac ointment to lips as needed.  Clotrimazole cream  added after seeing dermatology.    Previous therapies  Tacrolimus-previously decided to stay on same dose, no checks of levels if clinically stable, and no need switch to sirolimus. (keep tac low as gvhd is quiet and viremia). 5/10 level 45 with starting of COVID therapy and D-D intractions (Paxlovid for COVID19, which has a drug interaction with tacrolimus-Ritonavir increases serum levels of tacrolimus).   Tacrolimus level subtherapeutic, increase to 2.5 mg twice daily recently, 8/23/2022 instructed to change tacrolimus to 2 mg twice daily. 9/6 with mild NEGRA, hyperkalemia, hypomagnesemia, and reports some tremors and cramps, suspect tacrolimus to be high therefore empirically decreased to 1.5 twice daily, skip morning dose of tacrolimus and took 2 mg today after his afternoon labs. Decrease to 1mg BID on Saturday.  Sirolimus  9/21 despite fluids and a dose adjustment of tacrolimus patient seem to have persistence NORBERTO related NEGRA, therefore after discussion with the patient decision was made to transition to sirolimus that was started on 9/21, patient initially seem to tolerate this well with normalization of kidney function  10/11 presented with flares of ocular and oral GVHD, fluid retention and gain of 5-6 kg, lower extremity edema, abdominal distention, total protein to creatinine ratio elevated at 0.4, in addition to elevation in BUN and creatinine, HTN.  Picture most consistent with sirolimus related side effects.  Less likely that the patient will tolerate even a lower level of sirolimus.  Therefore the patient was instructed to stop sirolimus, last dose on 10/10.  10/14 level 3.5 appropriately trending down.    Corticosteroids  3/1-3/16: prednisone 100mg every day  3/17 75mg every day   3/31 75 alt with 50  4/6: 75 alt with 25mg every other day  4/12 pred tapered to 60 alternating with 25mg   4/15 In setting of viruses trying to reactivate,GVHD stable: Taper 60/15mg alternating.  4/20 decrease pred further  to 50/10.    4/25 taper pred to 50/0 every other day as long as symptoms stable.   5/2 Pred decrease 40/0 alternating every other day.   5/13 decrease to 30/0  5/20 decrease to 20/0 then slowly by 5 mg weekly  6/7 decrease to 10/0  Went up to 15/0 with mild increased LFTs  8/23/2022 increased to 20/0, with persistence of oral ulcers and active ocular GVHD.  Discussed with the patient the importance of stopping chewing of nicotine gums for prolonged hours as that would not help with the healing of the oral ulcers.  I discussed with the patient alternatives of nicotine patches that he will reconsider and let me know.  9/6 decreased to 15 alternating with 0  9/13 decreased to 10 alternating with 0  9/21 discontinued tacrolimus given persistent NEGRA and single kidney and start the patient on sirolimus without loading dose.  We will get a list of possible side effects and adverse events from the Pharm.D. see sirolimus section above.  10/11 GVHD flare. Sirolimus to be stopped. Patient will resume prednisone 40 mg daily as of 10/12, no change with mildly worsening eye pain 10/14, today ocular symptoms improved we will continue on same dose to allow time for Belumosudil to show activity    Belumosudil  Patient intolerant/with disease flares on tacrolimus and sirolimus see above.  Discussed with the patient the different options for therapy of chronic GVHD that are FDA approved.  Given the side effect profiles after discussing in detail and given the least nephrotoxicity and expected response, taking into account other metabolic effect as well.  We will proceed with prior authorization with belumosudil.  Patient was given material to review for possible expected adverse events and side effects with both Belumosodil and ruxolitinib.   --- Started on 10/18 in p.m.    5.  ID: Afebrile with no current signs or symptoms of infection  #History of COVID   Positive via homed test 5/8. Treated with Paxlovid with resolution of  symptoms.  Had recurrence on 5/18. Resolved  Covid 11/21, 12/21, due for booster will recommend locally but he would like it here     #History of pulmonary infiltrates:   4/20 CT chest with new RUL infiltrate. Highly immunocompromised. 4/22 Fungitell/asp GM were neg. BAL 4/29 NGTD, increased micafungin to 150mg IV daily-- f/u 6/1 Dr Garcia CT with mixed results, followed with Dr. Garcia August 2022 with resolution of pulmonary nodules and normalization of LFTs we will switch patient will switch patient to posaconazole prophylactic dose and monitor LFTs and any infectious concerns closely (wnl stable 10/14)    #History of CMV viremia:    - CMV viremia up to 1100. 4/15 started valcyte 900mg PO BID. 4/20 CMV <137, 5/10 ND, decreased to 450mg BID 4/30 - continue 4 weeks as long as CMV <137 till 5/28 + weekly CMV  - Switch to acyclovir after completion of current therapy 5/28. 10/11 CMV ND. Check monthly on IST    - PPx:   ACV  Posaconazole (previously on micafungin with LFts abl, hx of pulmonary nodules needign treatment dose). Pentamidine, last 9/20, we will schedule 10/25  Azithro 250mg daily (stopped levaquin due to possible tendonitis).     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 S/p 4/22 and 5/4/22 rituximab 375mg/m2 5/10 ND x2, 6/7 ND, 8/18/2022 971, 10/11 ND, check every other month on IST  S/p covid vaccine series 12/2021  S/p evusheld   Deferred annual vaccines in the setting of GVHD and immunosuppression therapy    6. Endo: Hx of graves disease; On synthroid 175 mcg daily. TSH normal 4/6/2022 no reflex to T4.  Recheck on 10/18 T3 low at 0.03, free T3 and T4 within normal.  Subclinical hyperthyroidism, will discuss with endocrine.     7. CV:   -Hypertension on Norvasc: 5mg, will monitor blood pressure closely see if it normalizes again off sirolimus, could consider Norvasc increased however this is associated with lower extremity edema and other alternatives including ACE inhibitors could be  better in the setting of proteinuria, this will be further determined when he follows with nephrology.    8. GI:   -Omeprazole for heartburn  -LFTs as above, now normal.   9/6 decreased ursodiol to daily, monitor, posaconazole level from August 2022 within normal.    Will defer starting statins for hyperlipidemia given recent transaminitis and start of posaconazole, will revisit and initiate appropriate therapy.      9. Psych:   - Situational anxiety - lexapro 10mg daily.   - Insomnia: worse on steroids. Ativan, trazadone, melatonin    10. MSK: left food drop, PT. Occasional muscle cramps discussed optimizing vitamin D levels and considering vitamin D, some of the symptomatology of muscle cramps are likely related to chronic GVHD.      11. HCM/Age appropriate cancer screening:  -Discussed with the patient importance of age-appropriate cancer screening including colonoscopies, he is due for this.  -Discussed importance of at least once a year vitamin D level check, 8/18/2022 wnl  -Screening for secondary iron overload, 8/2022 ferritin 1023 we will recheck in 2-3 months  -Yearly TSH 2022 wnl; yearly lipid panel - see GI section above  -9/6/2022 DEXA scan Based on BMD diagnosis is consistent with normal bone density based on WHO criteria Ref. 1   -PFTs 9/20, see above  -Metabolic other, 8/2022 testosterone within normal      Plan:    Start Belumosodil 10/18  Continue same dose of steroids for now  Recheck serum ferritin with iron panel 1 month from 10/18  Needs pentamadine on 10/25  We will recheck lipid profile as well on 11/1, patient aware  Follow-up with ophthalmology on 10/20 for further recommendations of topical therapy for optimization of ocular GVHD  Follow-up with nephrology on 11/1 for further evaluation of renal function and proteinuria/hypertension optimal management  Continue to monitor daily a.m. weights  Patient knows to call us with any significant changes or concerns for infections or any questions or  concerns  Follow-up with Avila Tobar on Tuesday 11/8     I spent 45 minutes in the care of this patient today, which included time necessary for preparation for the visit, obtaining history, ordering medications/tests/procedures as medically indicated, review of pertinent medical literature, counseling of the patient, communication of recommendations to the care team, and documentation time.          Sincerely,    Akshat Tobar MD

## 2022-10-18 NOTE — PROGRESS NOTES
Nik Nava Patient was seen today for a Pentamidine nebulizer tx ordered by Natty Gay PA-C.    Patient was first given 2.5 mg of Albuterol (NDC 17871-116-41) nebulizer, after which Pentamidine 300 mg (Lot # [id number]) mixed with 6cc Sterile H20 was administered through a filtered nebulizer.    Pre-treatment: SpO2 = 98%   HR = 90   BBS = clear   Post-treatment: SpO2 = 97%  HR = 89  BBS = clear    No adverse side effects noted by the patient.    This service today was provided under Dr. Willis, who was available if needed.     Procedure was completed by Thuy Modi.

## 2022-10-18 NOTE — PROGRESS NOTES
This is a recent snapshot of the patient's [facility] medical record.  For current drug dose and complete information and questions, call [phone] or In Basket pool, [facility] ([id number])  CSN Number:  [id number]

## 2022-10-20 ENCOUNTER — OFFICE VISIT (OUTPATIENT)
Dept: OPHTHALMOLOGY | Facility: CLINIC | Age: 62
End: 2022-10-20
Payer: COMMERCIAL

## 2022-10-20 DIAGNOSIS — T86.09 GVHD AS COMPLICATION OF BONE MARROW TRANSPLANT (H): ICD-10-CM

## 2022-10-20 DIAGNOSIS — H16.123 FILAMENTARY KERATITIS OF BOTH EYES: ICD-10-CM

## 2022-10-20 DIAGNOSIS — D89.813 GVHD AS COMPLICATION OF BONE MARROW TRANSPLANT (H): ICD-10-CM

## 2022-10-20 DIAGNOSIS — H04.129 DRY EYE: Primary | ICD-10-CM

## 2022-10-20 DIAGNOSIS — E03.9 HYPOTHYROIDISM, UNSPECIFIED TYPE: Primary | ICD-10-CM

## 2022-10-20 LAB
CMV DNA SPEC NAA+PROBE-ACNC: NOT DETECTED IU/ML
T3FREE SERPL-MCNC: 2.7 PG/ML (ref 2–4.4)

## 2022-10-20 PROCEDURE — 99213 OFFICE O/P EST LOW 20 MIN: CPT | Performed by: OPTOMETRIST

## 2022-10-20 RX ORDER — ERYTHROMYCIN 5 MG/G
0.5 OINTMENT OPHTHALMIC AT BEDTIME
Qty: 3.5 G | Refills: 4 | Status: SHIPPED | OUTPATIENT
Start: 2022-10-20 | End: 2023-11-14

## 2022-10-20 ASSESSMENT — TONOMETRY
IOP_METHOD: TONOPEN
OD_IOP_MMHG: 13
OS_IOP_MMHG: 16

## 2022-10-20 ASSESSMENT — VISUAL ACUITY
OS_SC: 20/50
OD_SC: 20/80
OS_PH_SC: 20/20
METHOD: SNELLEN - LINEAR
OS_SC+: +2

## 2022-10-20 ASSESSMENT — CONF VISUAL FIELD
OD_NORMAL: 1
METHOD: COUNTING FINGERS
OS_SUPERIOR_NASAL_RESTRICTION: 0
OS_SUPERIOR_TEMPORAL_RESTRICTION: 0
OD_INFERIOR_NASAL_RESTRICTION: 0
OD_SUPERIOR_NASAL_RESTRICTION: 0
OD_INFERIOR_TEMPORAL_RESTRICTION: 0
OS_NORMAL: 1
OD_SUPERIOR_TEMPORAL_RESTRICTION: 0
OS_INFERIOR_TEMPORAL_RESTRICTION: 0
OS_INFERIOR_NASAL_RESTRICTION: 0

## 2022-10-20 ASSESSMENT — SLIT LAMP EXAM - LIDS
COMMENTS: 1+ BLEPH, THICKENED MARGINS, 1-2+ MGD
COMMENTS: 1+ BLEPH, THICKENED MARGINS, 1-2+ MGD

## 2022-10-20 NOTE — PROGRESS NOTES
A/P  1.) Dry Eye OU  -s/p BMT 08/2021  -Worsening dryness right eye>left eye, symptomatic for photophobia/tearing  -Right eye has been in BCL for several months, though recently issues with lens retention. Has been out x several weeks now  -Now on increased oral steroids which have improved ocular symptoms significantly  -Right eye still with moderate K stain and filaments. Left eye without K stain  -Given currently improved symptoms will keep BCL out. Will likely need to increase therapy once he tapers off oral steroids  -Continue FML bid OU. Continue AT tid OU. Start benny at bedtime. 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 Uses low plus readers for distance and higher plus readers for near    RTC 1 month recheck, sooner prn    I have confirmed the patient's CC, HPI and reviewed Past Medical History, Past Surgical History, Social History, Family History, Problem List, Medication List and agree with Tech note.     Aisha Juarez, OD [facility]     Principal Discharge DX:	HI (head injury)  Secondary Diagnosis:	Skin abrasion   1

## 2022-10-20 NOTE — PATIENT INSTRUCTIONS
CONTINUE FML (fluorometholone) steroid eye drops twice a day in both eyes    HOLD on ofloxacin antibiotic eyedrops at this time    CONTINUE artificial tears several times a day (at least 3x/day right eye)    START erythromycin ointment in both eyes at bedtime to help prevent the eyes from drying out overnight    CONSIDER wearing moisture chamber goggles at bedtime

## 2022-10-20 NOTE — NURSING NOTE
Chief Complaints and History of Present Illnesses   Patient presents with     Follow Up     Pt here for BCL change and IOP check each eye.      Chief Complaint(s) and History of Present Illness(es)     Follow Up            Associated symptoms: Negative for eye pain and headache    Comments: Pt here for BCL change and IOP check each eye.           Comments    Pt is back on steroid orally. Eye is feeling much better, especially while on the steroid.   Pt using:  Ofloxacin  BOGDAN PALOMARES 8:07 AM October 20, 2022

## 2022-10-21 DIAGNOSIS — T86.09 OTHER COMPLICATION OF BONE MARROW TRANSPLANT (H): ICD-10-CM

## 2022-10-21 DIAGNOSIS — T86.09 GVHD AS COMPLICATION OF BONE MARROW TRANSPLANT (H): ICD-10-CM

## 2022-10-21 DIAGNOSIS — C91.01 ACUTE LYMPHOBLASTIC LEUKEMIA (ALL) IN REMISSION (H): ICD-10-CM

## 2022-10-21 DIAGNOSIS — D89.813 GVHD AS COMPLICATION OF BONE MARROW TRANSPLANT (H): ICD-10-CM

## 2022-10-21 RX ORDER — PREDNISONE 10 MG/1
10 TABLET ORAL DAILY
Qty: 30 TABLET | Refills: 1 | Status: SHIPPED | OUTPATIENT
Start: 2022-10-21 | End: 2022-10-25

## 2022-10-25 ENCOUNTER — APPOINTMENT (OUTPATIENT)
Dept: LAB | Facility: CLINIC | Age: 62
End: 2022-10-25
Attending: INTERNAL MEDICINE
Payer: COMMERCIAL

## 2022-10-25 ENCOUNTER — ONCOLOGY VISIT (OUTPATIENT)
Dept: TRANSPLANT | Facility: CLINIC | Age: 62
End: 2022-10-25
Attending: INTERNAL MEDICINE
Payer: COMMERCIAL

## 2022-10-25 VITALS
SYSTOLIC BLOOD PRESSURE: 149 MMHG | OXYGEN SATURATION: 99 % | RESPIRATION RATE: 16 BRPM | HEART RATE: 94 BPM | WEIGHT: 260 LBS | BODY MASS INDEX: 34.31 KG/M2 | DIASTOLIC BLOOD PRESSURE: 82 MMHG | TEMPERATURE: 97.5 F

## 2022-10-25 DIAGNOSIS — D89.813 GVHD AS COMPLICATION OF BONE MARROW TRANSPLANT (H): ICD-10-CM

## 2022-10-25 DIAGNOSIS — T86.09 OTHER COMPLICATION OF BONE MARROW TRANSPLANT (H): ICD-10-CM

## 2022-10-25 DIAGNOSIS — C91.01 ACUTE LYMPHOBLASTIC LEUKEMIA (ALL) IN REMISSION (H): ICD-10-CM

## 2022-10-25 DIAGNOSIS — Z94.81 STATUS POST BONE MARROW TRANSPLANT (H): ICD-10-CM

## 2022-10-25 DIAGNOSIS — T86.09 GVHD AS COMPLICATION OF BONE MARROW TRANSPLANT (H): ICD-10-CM

## 2022-10-25 LAB
ALBUMIN SERPL BCG-MCNC: 3.8 G/DL (ref 3.5–5.2)
ALP SERPL-CCNC: 126 U/L (ref 40–129)
ALT SERPL W P-5'-P-CCNC: 37 U/L (ref 10–50)
ANION GAP SERPL CALCULATED.3IONS-SCNC: 10 MMOL/L (ref 7–15)
AST SERPL W P-5'-P-CCNC: 39 U/L (ref 10–50)
BASOPHILS # BLD AUTO: 0 10E3/UL (ref 0–0.2)
BASOPHILS NFR BLD AUTO: 0 %
BILIRUB SERPL-MCNC: 0.6 MG/DL
BUN SERPL-MCNC: 43.2 MG/DL (ref 8–23)
CALCIUM SERPL-MCNC: 9.5 MG/DL (ref 8.8–10.2)
CHLORIDE SERPL-SCNC: 103 MMOL/L (ref 98–107)
CREAT SERPL-MCNC: 1.19 MG/DL (ref 0.67–1.17)
DEPRECATED HCO3 PLAS-SCNC: 24 MMOL/L (ref 22–29)
EOSINOPHIL # BLD AUTO: 0 10E3/UL (ref 0–0.7)
EOSINOPHIL NFR BLD AUTO: 0 %
ERYTHROCYTE [DISTWIDTH] IN BLOOD BY AUTOMATED COUNT: 16.3 % (ref 10–15)
GFR SERPL CREATININE-BSD FRML MDRD: 68 ML/MIN/1.73M2
GLUCOSE SERPL-MCNC: 162 MG/DL (ref 70–99)
HCT VFR BLD AUTO: 32.9 % (ref 40–53)
HGB BLD-MCNC: 11.3 G/DL (ref 13.3–17.7)
IMM GRANULOCYTES # BLD: 0.2 10E3/UL
IMM GRANULOCYTES NFR BLD: 2 %
LYMPHOCYTES # BLD AUTO: 2 10E3/UL (ref 0.8–5.3)
LYMPHOCYTES NFR BLD AUTO: 19 %
MCH RBC QN AUTO: 34.1 PG (ref 26.5–33)
MCHC RBC AUTO-ENTMCNC: 34.3 G/DL (ref 31.5–36.5)
MCV RBC AUTO: 99 FL (ref 78–100)
MONOCYTES # BLD AUTO: 0.6 10E3/UL (ref 0–1.3)
MONOCYTES NFR BLD AUTO: 6 %
NEUTROPHILS # BLD AUTO: 7.7 10E3/UL (ref 1.6–8.3)
NEUTROPHILS NFR BLD AUTO: 73 %
NRBC # BLD AUTO: 0.1 10E3/UL
NRBC BLD AUTO-RTO: 1 /100
PLATELET # BLD AUTO: 132 10E3/UL (ref 150–450)
POTASSIUM SERPL-SCNC: 5.1 MMOL/L (ref 3.4–5.3)
PROT SERPL-MCNC: 6.2 G/DL (ref 6.4–8.3)
RBC # BLD AUTO: 3.31 10E6/UL (ref 4.4–5.9)
SODIUM SERPL-SCNC: 137 MMOL/L (ref 136–145)
WBC # BLD AUTO: 10.5 10E3/UL (ref 4–11)

## 2022-10-25 PROCEDURE — G0463 HOSPITAL OUTPT CLINIC VISIT: HCPCS

## 2022-10-25 PROCEDURE — 99215 OFFICE O/P EST HI 40 MIN: CPT

## 2022-10-25 PROCEDURE — 250N000011 HC RX IP 250 OP 636: Performed by: INTERNAL MEDICINE

## 2022-10-25 PROCEDURE — 80053 COMPREHEN METABOLIC PANEL: CPT

## 2022-10-25 PROCEDURE — 82040 ASSAY OF SERUM ALBUMIN: CPT

## 2022-10-25 PROCEDURE — 36591 DRAW BLOOD OFF VENOUS DEVICE: CPT

## 2022-10-25 PROCEDURE — 85025 COMPLETE CBC W/AUTO DIFF WBC: CPT

## 2022-10-25 RX ORDER — HEPARIN SODIUM (PORCINE) LOCK FLUSH IV SOLN 100 UNIT/ML 100 UNIT/ML
5 SOLUTION INTRAVENOUS ONCE
Status: COMPLETED | OUTPATIENT
Start: 2022-10-25 | End: 2022-10-25

## 2022-10-25 RX ORDER — PREDNISONE 20 MG/1
TABLET ORAL
Qty: 90 TABLET | Refills: 3 | Status: ON HOLD | COMMUNITY
Start: 2022-10-25 | End: 2022-12-03

## 2022-10-25 RX ORDER — PREDNISONE 10 MG/1
TABLET ORAL
Qty: 30 TABLET | Refills: 1 | Status: ON HOLD | OUTPATIENT
Start: 2022-10-25 | End: 2022-12-03

## 2022-10-25 RX ORDER — DOXYCYCLINE HYCLATE 100 MG
100 TABLET ORAL 2 TIMES DAILY
Qty: 20 TABLET | Refills: 0 | Status: SHIPPED | OUTPATIENT
Start: 2022-10-25 | End: 2022-12-06

## 2022-10-25 RX ORDER — PREDNISONE 5 MG/1
TABLET ORAL
Qty: 60 TABLET | Refills: 3 | Status: ON HOLD | OUTPATIENT
Start: 2022-10-25 | End: 2022-12-03

## 2022-10-25 RX ADMIN — Medication 5 ML: at 16:45

## 2022-10-25 ASSESSMENT — PAIN SCALES - GENERAL: PAINLEVEL: NO PAIN (0)

## 2022-10-25 NOTE — LETTER
10/25/2022         RE: Nik Nava  [id number] [facility] [id number]        Dear Colleague,    Thank you for referring your patient, Nik Nava, to the [facility] [place]. Please see a copy of my visit note below.      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    HPI:  Nik returns for follow up.  He is about the same as last week.  R eye is bothering him.  No contact in currently.  His wt is down from a couple days ago.  LE edema ongoing but not worse.  He remains active--working in his garage.  No fever or rash.  Multiple skin tears.  His neighbor cat bit and scratched him since the last visit.  No GI complaints.  No fever.  No SOB.  Mouth is a bit better.  Still using dex s/s.          Review of Systems                                                                                                                                         10 point Review of systems was negative except as detailed above     PHYSICAL EXAM                                                                                                                                                 KPS: 80  BP (!) 149/82   Pulse 94   Temp 97.5  F (36.4  C)   Resp 16   Wt 117.9 kg (260 lb)   SpO2 99%   BMI 34.31 kg/m      Wt Readings from Last 4 Encounters:   10/25/22 117.9 kg (260 lb)   10/18/22 117.9 kg (260 lb)   10/14/22 114.2 kg (251 lb 11.2 oz)   10/11/22 116.8 kg (257 lb 6.4 oz)      General: NAD.  HEENT: sclera anicteric, injected conjunctive with erythema and no discharge. Squinting. Oropharynx with mild lichenoid changes on bilateral inner cheeks, <25% lichenoid changes and erythema, 2-3 shallow ulcerations on right buccal mucosa surrounded by otherwise healthy appearing mucosa  Lungs:  CTA b/l  no wheezes  CV: RRR  no gallop  Abd; soft no tenderness; BS nl   Ext/ Skin:  Hyperpigmentation noted on torso, back and anterior pelvis.  Skin bruising and multiple skin tears notably on b/l forearms.  L forearm with small puncture (cat bite) with scant erythema surrounding it, no warmth/fluctuance.  Near this are a 2 1-2 cm scratches (also from cat).  LE Trace edema, no skin thickening.  Access: port-a-cath      cGVHD therapy started on February 24 2022  - Baseline NIH scoring 2/24/2022 : skin 2 maculopapular rash/erythema with no sclerotic features, mouth score 1 mild symptoms with lichenoid changes less than 25%, eyes score 2 moderate symptoms without new visual impairments due to KCS requiring lubricant eyedrops more than 3 times a day, overall mild scale for her provider  - 3/25/2022 1 month NIH treatment assessment on PQRST: skin 2, mouth score 1 mild symptoms with NO lichenoid changes, eyes score 2 moderate symptoms w requiring lubricant eyedrops more than 3 times a day, liver score 1 ALT 3.7x ULN and nl bilirubin   - 3/31  Mouth clear; eyes minimal LFT still abn; no joint or new GI sx  - 4/28 Mouth clear, Left>R eye is mild sclera erythema, lids are pink and irritated appearing, LFT's slow improvement, no new joint, skin, or GI symptoms.   -5/11 mouth with mild erythema but no lichenoid changes, eyes using eyedrops 4-6 times a day score of 2, LFTs significantly improved from baseline slight elevation in alk phos and AST with normal bilirubin, skin with hyperpigmentation from old acute GVHD no active skin rashes, no GI symptoms no musculoskeletal complaints.  -5/26 Mouth with very slight lichenoid changes, erythema.  Eyes still using eyedrops 4-6x per day.  LFTs essentially normal with slight elevation in alk phos.  Skin with hyperpigmentation from old acute GVHD, no active rashes, no GI or MSK concerns.  Skin 0, mouth 0 but with lichenoid changes, eyes 2  -6/7/22 Mouth with no lichenoid changes, erythema.  Eyes still using eyedrops 4-6x per day.  LFTs essentially normal with slight  elevation in alk phos.  Skin with hyperpigmentation from old acute GVHD, no active rashes, no GI or MSK concerns.  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 S/p allo sib PBSCT. (ABO matched)  HCT-CI score: 3 (prior solid tumor)  Day +100 bone marrow biopsy is 100% donor with no morphologic or flow cytometric evidence of leukemia BCR abl is pending.  - Day +180 restaging BM bx- still in CR  100% donor;  2/16/22 BCR ABL major breakpoint undetectable.   - 1 year restaging 8/18/2022: Markedly hypocellular bone marrow [less than 10% cellular] with maturing trilineage hematopoiesis and no definite morphologic or immunophenotypic evidence for involvement by B lymphoblastic leukemia. BCRABL1 PCR not detected, bone marrow % donor. FISH NORMAL No evidence of BCR-ABL1 fusion  - Maintenance with TKI posttransplantation given his Ph positive ALL that have not been started previously presumed secondary to multiple active issues specifically infections and COVID.  Per Avila Tobar: will reconsider this patient will think about whether this is something he would like to consider he was previously on Dasatinib, we would start on a low dose and increase as tolerated.  This is on hold now pending active GVHD therapy, we discussed on this visit 10/18 in agreement with holding off.    2.  HEME:  - Pulmonary emboli: He developed a pulmonary emboli in the setting of receiving PEG asparaginase (ie provoked thrombus).  Xarelto complete.   - At 1 year anniversary visit completed screening for secondary iron overload, 8/2022 ferritin  1023, recheck on 10/18 increased to 2348, expect this is acute phase reactant with recent GVHD flare.  We will recheck in 1 month with full iron panel (mid November)    3.  FEN/Renal:  - Cr improved with switching patient to sirolimus briefly, now with new NEGRA on 10/11 with third spacing.   Pt continues to monitor weight at home sirolimus toxicity does not respond well to Lasix.  -Total protein to creatinine ratio 0.41 on 10/11 recheck 0.39 on 10/18  - Magnesium 2.1  - He has a history of renal cancer and resection. Has a single kidney.  *Elevated left arcuate artery resistive indices suggest medical renal disease.    4.    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   #Skin GVHD- history of biopsy proven GVHD of the skin 8/30/2021; resolved.   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 WNL today 10/14  # Ocular GVHD: follows with ophthalmology. Maxitrol to lids, continue refreshing drops QID. Score 3  Seen 9/20 by Dr. Juarez, scheduled to be seen again next month--pt may call to be seen sooner.    1.) Dry Eye OU  -s/p BMT 08/2021  -Worsening dryness right eye>left eye, symptomatic for photophobia/tearing  -Right eye cornea improved with BCL wear (though not in today), left eye stable without sig stain  -Failed: Restasis/Xiidra (stinging), plugs (scarred puncta)  -Currently on: AT 3-10x/day as needed.   -Doing well on BCL with daily oflox right eye, FML bid OU  -Replaced BCL today. Continue current regimen. Air Optix N&D  -Consider monthly replacement of BCL for now, until eyes have stabilized    2.) Hyperopia/Presbyopia OU  -Previously BCVA 20/20 with correction. Uses low plus readers for distance and higher plus readers for near  -Right eye acuity back to baseline    # Oral GVHD: dex s/s three-4 times a day; tac ointment to lips as needed.  Clotrimazole cream added after seeing dermatology.    Previous therapies  Tacrolimus-previously decided  to stay on same dose, no checks of levels if clinically stable, and no need switch to sirolimus. (keep tac low as gvhd is quiet and viremia). 5/10 level 45 with starting of COVID therapy and D-D intractions (Paxlovid for COVID19, which has a drug interaction with tacrolimus-Ritonavir increases serum levels of tacrolimus).   Tacrolimus level subtherapeutic, increase to 2.5 mg twice daily recently, 8/23/2022 instructed to change tacrolimus to 2 mg twice daily. 9/6 with mild NEGRA, hyperkalemia, hypomagnesemia, and reports some tremors and cramps, suspect tacrolimus to be high therefore empirically decreased to 1.5 twice daily, skip morning dose of tacrolimus and took 2 mg today after his afternoon labs. Decrease to 1mg BID on Saturday.  Sirolimus  9/21 despite fluids and a dose adjustment of tacrolimus patient seem to have persistence NORBERTO related NEGRA, therefore after discussion with the patient decision was made to transition to sirolimus that was started on 9/21, patient initially seem to tolerate this well with normalization of kidney function  10/11 presented with flares of ocular and oral GVHD, fluid retention and gain of 5-6 kg, lower extremity edema, abdominal distention, total protein to creatinine ratio elevated at 0.4, in addition to elevation in BUN and creatinine, HTN.  Picture most consistent with sirolimus related side effects.  Less likely that the patient will tolerate even a lower level of sirolimus.  Therefore the patient was instructed to stop sirolimus, last dose on 10/10.  10/14 level 3.5 appropriately trending down.    Corticosteroids  3/1-3/16: prednisone 100mg every day  3/17 75mg every day   3/31 75 alt with 50  4/6: 75 alt with 25mg every other day  4/12 pred tapered to 60 alternating with 25mg   4/15 In setting of viruses trying to reactivate,GVHD stable: Taper 60/15mg alternating.  4/20 decrease pred further to 50/10.    4/25 taper pred to 50/0 every other day as long as symptoms stable.   5/2  Pred decrease 40/0 alternating every other day.   5/13 decrease to 30/0  5/20 decrease to 20/0 then slowly by 5 mg weekly  6/7 decrease to 10/0  Went up to 15/0 with mild increased LFTs  8/23/2022 increased to 20/0, with persistence of oral ulcers and active ocular GVHD.  Discussed with the patient the importance of stopping chewing of nicotine gums for prolonged hours as that would not help with the healing of the oral ulcers.  I discussed with the patient alternatives of nicotine patches that he will reconsider and let me know.  9/6 decreased to 15 alternating with 0  9/13 decreased to 10 alternating with 0  9/21 discontinued tacrolimus given persistent NEGRA and single kidney and start the patient on sirolimus without loading dose.  We will get a list of possible side effects and adverse events from the Pharm.D. see sirolimus section above.  10/11 GVHD flare. Sirolimus to be stopped. Patient will resume prednisone 40 mg daily as of 10/12, no change with mildly worsening eye pain 10/14, today ocular symptoms improved we will continue on same dose to allow time for Belumosudil to show activity  -10/18 decreased pred to 35  -10/25 decreased pred to 30    Belumosudil  Patient intolerant/with disease flares on tacrolimus and sirolimus see above.  Discussed with the patient the different options for therapy of chronic GVHD that are FDA approved.  Given the side effect profiles after discussing in detail and given the least nephrotoxicity and expected response, taking into account other metabolic effect as well.  We will proceed with prior authorization with belumosudil.  Patient was given material to review for possible expected adverse events and side effects with both Belumosodil and ruxolitinib.   --- Started on 10/18 in p.m.    5.  ID: Afebrile   # cat bite/scratch: doxy 100mg BID x 10 days    #History of COVID   Positive via homed test 5/8. Treated with Paxlovid with resolution of symptoms.  Had recurrence on 5/18.  Resolved  Covid 11/21, 12/21, due for booster will recommend locally but he would like it here     #History of pulmonary infiltrates:   4/20 CT chest with new RUL infiltrate. Highly immunocompromised. 4/22 Fungitell/asp GM were neg. BAL 4/29 NGTD, increased micafungin to 150mg IV daily-- f/u 6/1 Dr Garcia CT with mixed results, followed with Dr. Garcia August 2022 with resolution of pulmonary nodules and normalization of LFTs we will switch patient will switch patient to posaconazole prophylactic dose and monitor LFTs and any infectious concerns closely (wnl stable 10/14)    #History of CMV viremia:    - CMV viremia up to 1100. 4/15 started valcyte 900mg PO BID. 4/20 CMV <137, 5/10 ND, decreased to 450mg BID 4/30 - continue 4 weeks as long as CMV <137 till 5/28 + weekly CMV  - Switch to acyclovir after completion of current therapy 5/28. 10/11 CMV ND. Check monthly on IST    - PPx:   ACV  Posaconazole (previously on micafungin with LFts abl, hx of pulmonary nodules needing treatment dose). Pentamidine, last 10/18, next 11/22  Azithro 250mg daily (stopped levaquin due to possible tendonitis).     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 S/p 4/22 and 5/4/22 rituximab 375mg/m2 5/10 ND x2, 6/7 ND, 8/18/2022 971, 10/11 ND, check every other month on IST  S/p covid vaccine series 12/2021  S/p evusheld   Deferred annual vaccines in the setting of GVHD and immunosuppression therapy    6. Endo: Hx of graves disease; On synthroid 175 mcg daily. TSH normal 4/6/2022 no reflex to T4.  Recheck on 10/18 T3 low at 0.03, free T3 and T4 within normal.  Subclinical hyperthyroidism, will discuss with endocrine.     7. CV:   -Hypertension on Norvasc: 5mg, will monitor blood pressure closely see if it normalizes again off sirolimus, could consider Norvasc increased however this is associated with lower extremity edema and other alternatives including ACE inhibitors could be better in the setting of proteinuria, this will be  further determined when he follows with nephrology.    8. GI:   - Omeprazole for heartburn  - LFTs as above, now normal.   9/6 decreased ursodiol to daily, monitor, posaconazole level from August 2022 within normal.    Will defer starting statins for hyperlipidemia given recent transaminitis and start of posaconazole, will revisit and initiate appropriate therapy.      9. Psych:   - Situational anxiety - lexapro 10mg daily.   - Insomnia: worse on steroids. Ativan, trazadone, melatonin    10. MSK: left food drop, PT. Occasional muscle cramps discussed optimizing vitamin D levels and considering vitamin D, some of the symptomatology of muscle cramps are likely related to chronic GVHD.      11. HCM/Age appropriate cancer screening (discussed at prior visit):  -Discussed with the patient importance of age-appropriate cancer screening including colonoscopies, he is due for this.  -Discussed importance of at least once a year vitamin D level check, 8/18/2022 wnl  -Screening for secondary iron overload, 8/2022 ferritin 1023 we will recheck in 2-3 months  -Yearly TSH 2022 wnl; yearly lipid panel - see GI section above  -9/6/2022 DEXA scan Based on BMD diagnosis is consistent with normal bone density based on WHO criteria Ref. 1   -PFTs 9/20, see above  -Metabolic other, 8/2022 testosterone within normal      Plan:    Start doxy course; call if cat bite lesion worsens  Recheck serum ferritin with iron panel mid november  We will recheck lipid profile as well on 11/1, patient aware    Follow-up with ophthalmology monthly or sooner prn    RTC 11/1 for lab/provider  11/1: Follow-up with nephrology for further evaluation of renal function and proteinuria/hypertension optimal management    Continue to monitor daily a.m. weights    11/8 Follow-up with Avila Tobar I spent 55 minutes in the care of this patient today, which included time necessary for preparation for the visit, obtaining history, ordering  medications/tests/procedures as medically indicated, review of pertinent medical literature, counseling of the patient, communication of recommendations to the care team, and documentation time.        Again, thank you for allowing me to participate in the care of your patient.      Sincerely,    No name on file

## 2022-10-25 NOTE — NURSING NOTE
"Oncology Rooming Note    October 25, 2022 4:55 PM   Nik Nava is a 62 year old male who presents for:    Chief Complaint   Patient presents with     Port Draw     Labs drawn by RN via port, vitals taken.     Oncology Clinic Visit     Acute Lymphoblastic Leukemia in Remission     Initial Vitals: BP (!) 149/82   Pulse 94   Temp 97.5  F (36.4  C)   Resp 16   Wt 117.9 kg (260 lb)   SpO2 99%   BMI 34.31 kg/m   Estimated body mass index is 34.31 kg/m  as calculated from the following:    Height as of 10/11/22: 1.854 m (6' 0.99\").    Weight as of this encounter: 117.9 kg (260 lb). Body surface area is 2.46 meters squared.  No Pain (0) Comment: Data Unavailable   No LMP for male patient.  Allergies reviewed: Yes  Medications reviewed: Yes    Medications: Medication refills not needed today.  Pharmacy name entered into PLASTIQ:    [facility] - [place], [place] - [address] [facility] [place], [place] - [phone]  [facility]    Clinical concerns: Pt presents today for f/u.       Deborah Michaels LPN  10/25/2022              "

## 2022-10-25 NOTE — PROGRESS NOTES
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    HPI:  Nik returns for follow up.  He is about the same as last week.  R eye is bothering him.  No contact in currently.  His wt is down from a couple days ago.  LE edema ongoing but not worse.  He remains active--working in his garage.  No fever or rash.  Multiple skin tears.  His neighbor cat bit and scratched him since the last visit.  No GI complaints.  No fever.  No SOB.  Mouth is a bit better.  Still using dex s/s.          Review of Systems                                                                                                                                         10 point Review of systems was negative except as detailed above     PHYSICAL EXAM                                                                                                                                                 KPS: 80  BP (!) 149/82   Pulse 94   Temp 97.5  F (36.4  C)   Resp 16   Wt 117.9 kg (260 lb)   SpO2 99%   BMI 34.31 kg/m      Wt Readings from Last 4 Encounters:   10/25/22 117.9 kg (260 lb)   10/18/22 117.9 kg (260 lb)   10/14/22 114.2 kg (251 lb 11.2 oz)   10/11/22 116.8 kg (257 lb 6.4 oz)      General: NAD.  HEENT: sclera anicteric, injected conjunctive with erythema and no discharge. Squinting. Oropharynx with mild lichenoid changes on bilateral inner cheeks, <25% lichenoid changes and erythema, 2-3 shallow ulcerations on right buccal mucosa surrounded by otherwise healthy appearing mucosa  Lungs:  CTA b/l  no wheezes  CV: RRR  no gallop  Abd; soft no tenderness; BS nl   Ext/ Skin: Hyperpigmentation noted on torso, back and anterior pelvis.  Skin bruising and multiple skin tears notably on b/l forearms.  L forearm with small puncture (cat bite) with scant erythema surrounding it, no warmth/fluctuance.  Near this are a 2 1-2 cm scratches (also from cat).  LE Trace  edema, no skin thickening.  Access: port-a-cath      cGVHD therapy started on February 24 2022  - Baseline NIH scoring 2/24/2022 : skin 2 maculopapular rash/erythema with no sclerotic features, mouth score 1 mild symptoms with lichenoid changes less than 25%, eyes score 2 moderate symptoms without new visual impairments due to KCS requiring lubricant eyedrops more than 3 times a day, overall mild scale for her provider  - 3/25/2022 1 month NIH treatment assessment on PQRST: skin 2, mouth score 1 mild symptoms with NO lichenoid changes, eyes score 2 moderate symptoms w requiring lubricant eyedrops more than 3 times a day, liver score 1 ALT 3.7x ULN and nl bilirubin   - 3/31  Mouth clear; eyes minimal LFT still abn; no joint or new GI sx  - 4/28 Mouth clear, Left>R eye is mild sclera erythema, lids are pink and irritated appearing, LFT's slow improvement, no new joint, skin, or GI symptoms.   -5/11 mouth with mild erythema but no lichenoid changes, eyes using eyedrops 4-6 times a day score of 2, LFTs significantly improved from baseline slight elevation in alk phos and AST with normal bilirubin, skin with hyperpigmentation from old acute GVHD no active skin rashes, no GI symptoms no musculoskeletal complaints.  -5/26 Mouth with very slight lichenoid changes, erythema.  Eyes still using eyedrops 4-6x per day.  LFTs essentially normal with slight elevation in alk phos.  Skin with hyperpigmentation from old acute GVHD, no active rashes, no GI or MSK concerns.  Skin 0, mouth 0 but with lichenoid changes, eyes 2  -6/7/22 Mouth with no lichenoid changes, erythema.  Eyes still using eyedrops 4-6x per day.  LFTs essentially normal with slight elevation in alk phos.  Skin with hyperpigmentation from old acute GVHD, no active rashes, no GI or MSK concerns.  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  Acute lymphoblastic leukemia, [place] chromosome positive in CR, MRD neg. S/p allo sib PBSCT. (ABO matched)  HCT-CI score: 3 (prior solid tumor)  Day +100 bone marrow biopsy is 100% donor with no morphologic or flow cytometric evidence of leukemia BCR abl is pending.  - Day +180 restaging BM bx- still in CR  100% donor;  2/16/22 BCR ABL major breakpoint undetectable.   - 1 year restaging 8/18/2022: Markedly hypocellular bone marrow [less than 10% cellular] with maturing trilineage hematopoiesis and no definite morphologic or immunophenotypic evidence for involvement by B lymphoblastic leukemia. BCRABL1 PCR not detected, bone marrow % donor. FISH NORMAL No evidence of BCR-ABL1 fusion  - Maintenance with TKI posttransplantation given his Ph positive ALL that have not been started previously presumed secondary to multiple active issues specifically infections and COVID.  Per Avila Tobar: will reconsider this patient will think about whether this is something he would like to consider he was previously on Dasatinib, we would start on a low dose and increase as tolerated.  This is on hold now pending active GVHD therapy, we discussed on this visit 10/18 in agreement with holding off.    2.  HEME:  - Pulmonary emboli: He developed a pulmonary emboli in the setting of receiving PEG asparaginase (ie provoked thrombus).  Xarelto complete.   - At 1 year anniversary visit completed screening for secondary iron overload, 8/2022 ferritin 1023, recheck on 10/18 increased to 2348, expect this is acute phase reactant with recent GVHD flare.  We will recheck in 1 month with full iron panel (mid November)    3.  FEN/Renal:  - Cr improved with switching patient to sirolimus briefly, now with new NEGRA on 10/11 with third  spacing.   Pt continues to monitor weight at home sirolimus toxicity does not respond well to Lasix.  -Total protein to creatinine ratio 0.41 on 10/11 recheck 0.39 on 10/18  - Magnesium 2.1  - He has a history of renal cancer and resection. Has a single kidney.  *Elevated left arcuate artery resistive indices suggest medical renal disease.    4.    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   #Skin GVHD- history of biopsy proven GVHD of the skin 8/30/2021; resolved.   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 WNL today 10/14  # Ocular GVHD: follows with ophthalmology. Maxitrol to lids, continue refreshing drops QID. Score 3  Seen 9/20 by Dr. Juarez, scheduled to be seen again next month--pt may call to be seen sooner.    1.) Dry Eye OU  -s/p BMT 08/2021  -Worsening dryness right eye>left eye, symptomatic for photophobia/tearing  -Right eye cornea improved with BCL wear (though not in today), left eye stable without sig stain  -Failed: Restasis/Xiidra (stinging), plugs (scarred puncta)  -Currently on: AT 3-10x/day as needed.   -Doing well on BCL with daily oflox right eye, FML bid OU  -Replaced BCL today. Continue current regimen. Air Optix N&D  -Consider monthly replacement of BCL for now, until eyes have stabilized    2.) Hyperopia/Presbyopia OU  -Previously BCVA 20/20 with correction. Uses low plus readers for distance and higher plus readers for near  -Right eye acuity back to baseline    # Oral GVHD: dex s/s three-4 times a day; tac ointment to lips as needed.  Clotrimazole cream added after seeing dermatology.    Previous therapies  Tacrolimus-previously decided to stay on same dose, no checks of levels if clinically stable, and no need switch to sirolimus. (keep tac low as gvhd is quiet and viremia). 5/10 level 45 with starting of COVID therapy and D-D intractions (Paxlovid for COVID19, which has a drug interaction with  tacrolimus-Ritonavir increases serum levels of tacrolimus).   Tacrolimus level subtherapeutic, increase to 2.5 mg twice daily recently, 8/23/2022 instructed to change tacrolimus to 2 mg twice daily. 9/6 with mild NEGRA, hyperkalemia, hypomagnesemia, and reports some tremors and cramps, suspect tacrolimus to be high therefore empirically decreased to 1.5 twice daily, skip morning dose of tacrolimus and took 2 mg today after his afternoon labs. Decrease to 1mg BID on Saturday.  Sirolimus  9/21 despite fluids and a dose adjustment of tacrolimus patient seem to have persistence NORBERTO related NEGRA, therefore after discussion with the patient decision was made to transition to sirolimus that was started on 9/21, patient initially seem to tolerate this well with normalization of kidney function  10/11 presented with flares of ocular and oral GVHD, fluid retention and gain of 5-6 kg, lower extremity edema, abdominal distention, total protein to creatinine ratio elevated at 0.4, in addition to elevation in BUN and creatinine, HTN.  Picture most consistent with sirolimus related side effects.  Less likely that the patient will tolerate even a lower level of sirolimus.  Therefore the patient was instructed to stop sirolimus, last dose on 10/10.  10/14 level 3.5 appropriately trending down.    Corticosteroids  3/1-3/16: prednisone 100mg every day  3/17 75mg every day   3/31 75 alt with 50  4/6: 75 alt with 25mg every other day  4/12 pred tapered to 60 alternating with 25mg   4/15 In setting of viruses trying to reactivate,GVHD stable: Taper 60/15mg alternating.  4/20 decrease pred further to 50/10.    4/25 taper pred to 50/0 every other day as long as symptoms stable.   5/2 Pred decrease 40/0 alternating every other day.   5/13 decrease to 30/0  5/20 decrease to 20/0 then slowly by 5 mg weekly  6/7 decrease to 10/0  Went up to 15/0 with mild increased LFTs  8/23/2022 increased to 20/0, with persistence of oral ulcers and active  ocular GVHD.  Discussed with the patient the importance of stopping chewing of nicotine gums for prolonged hours as that would not help with the healing of the oral ulcers.  I discussed with the patient alternatives of nicotine patches that he will reconsider and let me know.  9/6 decreased to 15 alternating with 0  9/13 decreased to 10 alternating with 0  9/21 discontinued tacrolimus given persistent NEGRA and single kidney and start the patient on sirolimus without loading dose.  We will get a list of possible side effects and adverse events from the Pharm.D. see sirolimus section above.  10/11 GVHD flare. Sirolimus to be stopped. Patient will resume prednisone 40 mg daily as of 10/12, no change with mildly worsening eye pain 10/14, today ocular symptoms improved we will continue on same dose to allow time for Belumosudil to show activity  -10/18 decreased pred to 35  -10/25 decreased pred to 30    Belumosudil  Patient intolerant/with disease flares on tacrolimus and sirolimus see above.  Discussed with the patient the different options for therapy of chronic GVHD that are FDA approved.  Given the side effect profiles after discussing in detail and given the least nephrotoxicity and expected response, taking into account other metabolic effect as well.  We will proceed with prior authorization with belumosudil.  Patient was given material to review for possible expected adverse events and side effects with both Belumosodil and ruxolitinib.   --- Started on 10/18 in p.m.    5.  ID: Afebrile   # cat bite/scratch: doxy 100mg BID x 10 days    #History of COVID   Positive via homed test 5/8. Treated with Paxlovid with resolution of symptoms.  Had recurrence on 5/18. Resolved  Covid 11/21, 12/21, due for booster will recommend locally but he would like it here     #History of pulmonary infiltrates:   4/20 CT chest with new RUL infiltrate. Highly immunocompromised. 4/22 Fungitell/asp GM were neg. BAL 4/29 NGTD, increased  micafungin to 150mg IV daily-- f/u 6/1 Dr Garcia CT with mixed results, followed with Dr. Garcia August 2022 with resolution of pulmonary nodules and normalization of LFTs we will switch patient will switch patient to posaconazole prophylactic dose and monitor LFTs and any infectious concerns closely (wnl stable 10/14)    #History of CMV viremia:    - CMV viremia up to 1100. 4/15 started valcyte 900mg PO BID. 4/20 CMV <137, 5/10 ND, decreased to 450mg BID 4/30 - continue 4 weeks as long as CMV <137 till 5/28 + weekly CMV  - Switch to acyclovir after completion of current therapy 5/28. 10/11 CMV ND. Check monthly on IST    - PPx:   ACV  Posaconazole (previously on micafungin with LFts abl, hx of pulmonary nodules needing treatment dose). Pentamidine, last 10/18, next 11/22  Azithro 250mg daily (stopped levaquin due to possible tendonitis).     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 S/p 4/22 and 5/4/22 rituximab 375mg/m2 5/10 ND x2, 6/7 ND, 8/18/2022 971, 10/11 ND, check every other month on IST  S/p covid vaccine series 12/2021  S/p evusheld   Deferred annual vaccines in the setting of GVHD and immunosuppression therapy    6. Endo: Hx of graves disease; On synthroid 175 mcg daily. TSH normal 4/6/2022 no reflex to T4.  Recheck on 10/18 T3 low at 0.03, free T3 and T4 within normal.  Subclinical hyperthyroidism, will discuss with endocrine.     7. CV:   -Hypertension on Norvasc: 5mg, will monitor blood pressure closely see if it normalizes again off sirolimus, could consider Norvasc increased however this is associated with lower extremity edema and other alternatives including ACE inhibitors could be better in the setting of proteinuria, this will be further determined when he follows with nephrology.    8. GI:   - Omeprazole for heartburn  - LFTs as above, now normal.   9/6 decreased ursodiol to daily, monitor, posaconazole level from August 2022 within normal.    Will defer starting statins for  hyperlipidemia given recent transaminitis and start of posaconazole, will revisit and initiate appropriate therapy.      9. Psych:   - Situational anxiety - lexapro 10mg daily.   - Insomnia: worse on steroids. Ativan, trazadone, melatonin    10. MSK: left food drop, PT. Occasional muscle cramps discussed optimizing vitamin D levels and considering vitamin D, some of the symptomatology of muscle cramps are likely related to chronic GVHD.      11. HCM/Age appropriate cancer screening (discussed at prior visit):  -Discussed with the patient importance of age-appropriate cancer screening including colonoscopies, he is due for this.  -Discussed importance of at least once a year vitamin D level check, 8/18/2022 wnl  -Screening for secondary iron overload, 8/2022 ferritin 1023 we will recheck in 2-3 months  -Yearly TSH 2022 wnl; yearly lipid panel - see GI section above  -9/6/2022 DEXA scan Based on BMD diagnosis is consistent with normal bone density based on WHO criteria Ref. 1   -PFTs 9/20, see above  -Metabolic other, 8/2022 testosterone within normal      Plan:    Start doxy course; call if cat bite lesion worsens  Recheck serum ferritin with iron panel mid november  We will recheck lipid profile as well on 11/1, patient aware    Follow-up with ophthalmology monthly or sooner prn    RTC 11/1 for lab/provider  11/1: Follow-up with nephrology for further evaluation of renal function and proteinuria/hypertension optimal management    Continue to monitor daily a.m. weights    11/8 Follow-up with Avila Tobar I spent 55 minutes in the care of this patient today, which included time necessary for preparation for the visit, obtaining history, ordering medications/tests/procedures as medically indicated, review of pertinent medical literature, counseling of the patient, communication of recommendations to the care team, and documentation time.

## 2022-10-25 NOTE — NURSING NOTE
Chief Complaint   Patient presents with     Port Draw     Labs drawn by RN via port, vitals taken.     Port accessed with 20 gauge 3/4 inch gripper needle by RN, labs collected, line flushed with saline and heparin.  Vitals taken. Pt checked in for appointment(s).    Usha Sanabria RN

## 2022-10-27 NOTE — PROGRESS NOTES
This is a recent snapshot of the patient's [facility] medical record.  For current drug dose and complete information and questions, call [phone] or In Basket pool, [facility] ([id number])  CSN Number:  [id number]

## 2022-10-31 ENCOUNTER — LAB (OUTPATIENT)
Dept: ONCOLOGY | Facility: CLINIC | Age: 62
End: 2022-10-31
Payer: COMMERCIAL

## 2022-10-31 DIAGNOSIS — T86.09 GVHD AS COMPLICATION OF BONE MARROW TRANSPLANT (H): ICD-10-CM

## 2022-10-31 DIAGNOSIS — D89.813 GVHD AS COMPLICATION OF BONE MARROW TRANSPLANT (H): ICD-10-CM

## 2022-10-31 LAB
ALBUMIN SERPL-MCNC: 3.3 G/DL (ref 3.4–5)
ALP SERPL-CCNC: 113 U/L (ref 40–150)
ALT SERPL W P-5'-P-CCNC: 39 U/L (ref 0–70)
ANION GAP SERPL CALCULATED.3IONS-SCNC: 5 MMOL/L (ref 3–14)
AST SERPL W P-5'-P-CCNC: 34 U/L (ref 0–45)
BASOPHILS # BLD AUTO: 0 10E3/UL (ref 0–0.2)
BASOPHILS NFR BLD AUTO: 0 %
BILIRUB SERPL-MCNC: 0.9 MG/DL (ref 0.2–1.3)
BUN SERPL-MCNC: 44 MG/DL (ref 7–30)
CALCIUM SERPL-MCNC: 9.3 MG/DL (ref 8.5–10.1)
CHLORIDE BLD-SCNC: 106 MMOL/L (ref 94–109)
CHOLEST SERPL-MCNC: 355 MG/DL
CO2 SERPL-SCNC: 29 MMOL/L (ref 20–32)
CREAT SERPL-MCNC: 1.08 MG/DL (ref 0.66–1.25)
EOSINOPHIL # BLD AUTO: 0 10E3/UL (ref 0–0.7)
EOSINOPHIL NFR BLD AUTO: 0 %
ERYTHROCYTE [DISTWIDTH] IN BLOOD BY AUTOMATED COUNT: 16.7 % (ref 10–15)
FASTING STATUS PATIENT QL REPORTED: YES
GFR SERPL CREATININE-BSD FRML MDRD: 77 ML/MIN/1.73M2
GLUCOSE BLD-MCNC: 107 MG/DL (ref 70–99)
HCT VFR BLD AUTO: 32.2 % (ref 40–53)
HDLC SERPL-MCNC: 148 MG/DL
HGB BLD-MCNC: 11.1 G/DL (ref 13.3–17.7)
IMM GRANULOCYTES # BLD: 0.1 10E3/UL
IMM GRANULOCYTES NFR BLD: 1 %
LDLC SERPL CALC-MCNC: 176 MG/DL
LYMPHOCYTES # BLD AUTO: 2.3 10E3/UL (ref 0.8–5.3)
LYMPHOCYTES NFR BLD AUTO: 25 %
MCH RBC QN AUTO: 34.8 PG (ref 26.5–33)
MCHC RBC AUTO-ENTMCNC: 34.5 G/DL (ref 31.5–36.5)
MCV RBC AUTO: 101 FL (ref 78–100)
MONOCYTES # BLD AUTO: 1.1 10E3/UL (ref 0–1.3)
MONOCYTES NFR BLD AUTO: 12 %
NEUTROPHILS # BLD AUTO: 5.6 10E3/UL (ref 1.6–8.3)
NEUTROPHILS NFR BLD AUTO: 62 %
NONHDLC SERPL-MCNC: 207 MG/DL
NRBC # BLD AUTO: 0.1 10E3/UL
NRBC BLD AUTO-RTO: 1 /100
PLATELET # BLD AUTO: 146 10E3/UL (ref 150–450)
POTASSIUM BLD-SCNC: 4.5 MMOL/L (ref 3.4–5.3)
PROT SERPL-MCNC: 6.3 G/DL (ref 6.8–8.8)
RBC # BLD AUTO: 3.19 10E6/UL (ref 4.4–5.9)
SODIUM SERPL-SCNC: 140 MMOL/L (ref 133–144)
TRIGL SERPL-MCNC: 153 MG/DL
WBC # BLD AUTO: 9.1 10E3/UL (ref 4–11)

## 2022-10-31 PROCEDURE — 85025 COMPLETE CBC W/AUTO DIFF WBC: CPT | Performed by: INTERNAL MEDICINE

## 2022-10-31 PROCEDURE — 80053 COMPREHEN METABOLIC PANEL: CPT | Performed by: INTERNAL MEDICINE

## 2022-10-31 PROCEDURE — 36591 DRAW BLOOD OFF VENOUS DEVICE: CPT

## 2022-10-31 PROCEDURE — 80061 LIPID PANEL: CPT | Performed by: INTERNAL MEDICINE

## 2022-10-31 RX ORDER — HEPARIN SODIUM (PORCINE) LOCK FLUSH IV SOLN 100 UNIT/ML 100 UNIT/ML
500 SOLUTION INTRAVENOUS DAILY PRN
Status: DISCONTINUED | OUTPATIENT
Start: 2022-10-31 | End: 2022-10-31 | Stop reason: HOSPADM

## 2022-10-31 RX ADMIN — HEPARIN SODIUM (PORCINE) LOCK FLUSH IV SOLN 100 UNIT/ML 500 UNITS: 100 SOLUTION at 08:20

## 2022-10-31 NOTE — PROGRESS NOTES
Port accessed using 20 G 3/4 inch needle.  Labs collected from port.  Line flushed with NS and Heparin.  Pt tolerated procedure.  Port de-accessed per protocol.    Karla Fritz RN-BSN, PHN, OCN  [facility]

## 2022-11-01 ENCOUNTER — ONCOLOGY VISIT (OUTPATIENT)
Dept: TRANSPLANT | Facility: CLINIC | Age: 62
End: 2022-11-01
Attending: INTERNAL MEDICINE
Payer: COMMERCIAL

## 2022-11-01 ENCOUNTER — OFFICE VISIT (OUTPATIENT)
Dept: NEPHROLOGY | Facility: CLINIC | Age: 62
End: 2022-11-01
Attending: INTERNAL MEDICINE
Payer: COMMERCIAL

## 2022-11-01 VITALS
TEMPERATURE: 97.7 F | BODY MASS INDEX: 35.11 KG/M2 | RESPIRATION RATE: 16 BRPM | HEART RATE: 88 BPM | SYSTOLIC BLOOD PRESSURE: 148 MMHG | WEIGHT: 264.9 LBS | DIASTOLIC BLOOD PRESSURE: 76 MMHG | OXYGEN SATURATION: 97 % | HEIGHT: 73 IN

## 2022-11-01 VITALS
HEART RATE: 83 BPM | OXYGEN SATURATION: 99 % | TEMPERATURE: 97.5 F | DIASTOLIC BLOOD PRESSURE: 81 MMHG | BODY MASS INDEX: 34.13 KG/M2 | SYSTOLIC BLOOD PRESSURE: 136 MMHG | WEIGHT: 258.6 LBS

## 2022-11-01 DIAGNOSIS — C91.01 ACUTE LYMPHOBLASTIC LEUKEMIA (ALL) IN REMISSION (H): ICD-10-CM

## 2022-11-01 DIAGNOSIS — D89.813 GVHD AS COMPLICATION OF BONE MARROW TRANSPLANT (H): ICD-10-CM

## 2022-11-01 DIAGNOSIS — N17.9 AKI (ACUTE KIDNEY INJURY) (H): Primary | ICD-10-CM

## 2022-11-01 DIAGNOSIS — I10 HYPERTENSION, ESSENTIAL: ICD-10-CM

## 2022-11-01 DIAGNOSIS — T86.09 GVHD AS COMPLICATION OF BONE MARROW TRANSPLANT (H): ICD-10-CM

## 2022-11-01 DIAGNOSIS — T86.09 GVHD AS COMPLICATION OF BONE MARROW TRANSPLANT (H): Primary | ICD-10-CM

## 2022-11-01 DIAGNOSIS — D89.813 GVHD AS COMPLICATION OF BONE MARROW TRANSPLANT (H): Primary | ICD-10-CM

## 2022-11-01 PROCEDURE — G0463 HOSPITAL OUTPT CLINIC VISIT: HCPCS

## 2022-11-01 PROCEDURE — G0463 HOSPITAL OUTPT CLINIC VISIT: HCPCS | Mod: 27

## 2022-11-01 PROCEDURE — 99205 OFFICE O/P NEW HI 60 MIN: CPT | Mod: GC | Performed by: INTERNAL MEDICINE

## 2022-11-01 PROCEDURE — 99215 OFFICE O/P EST HI 40 MIN: CPT

## 2022-11-01 PROCEDURE — 99417 PROLNG OP E/M EACH 15 MIN: CPT | Performed by: INTERNAL MEDICINE

## 2022-11-01 RX ORDER — ACYCLOVIR 800 MG/1
800 TABLET ORAL 2 TIMES DAILY
Qty: 60 TABLET | Refills: 1 | Status: SHIPPED | OUTPATIENT
Start: 2022-11-01 | End: 2022-12-27

## 2022-11-01 RX ORDER — CHLORTHALIDONE 25 MG/1
12.5 TABLET ORAL DAILY
Qty: 15 TABLET | Refills: 6 | Status: SHIPPED | OUTPATIENT
Start: 2022-11-01 | End: 2023-01-10

## 2022-11-01 ASSESSMENT — PAIN SCALES - GENERAL
PAINLEVEL: NO PAIN (0)
PAINLEVEL: MILD PAIN (2)

## 2022-11-01 NOTE — PROGRESS NOTES
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    HPI:  Eyes still bothersome, especially the right one.  Mouth improved on prednisone.  Continues with dex s/s as well.  Legs still swollen; less so in the morning. Urinates a lot at night.  Eating a lot, craving carbs.         Review of Systems                                                                                                                                         8 point Review of systems was negative except as detailed above     PHYSICAL EXAM                                                                                                                                                 KPS: 80  BP (!) 148/76   Pulse 88   Temp 97.7  F (36.5  C) (Oral)   Resp 16   Ht 1.854 m (6' 0.99\")   Wt 120.2 kg (264 lb 14.4 oz)   SpO2 97%   BMI 34.96 kg/m      Wt Readings from Last 4 Encounters:   11/01/22 120.2 kg (264 lb 14.4 oz)   10/25/22 117.9 kg (260 lb)   10/18/22 117.9 kg (260 lb)   10/14/22 114.2 kg (251 lb 11.2 oz)      General: NAD.  HEENT: sclera anicteric, injected conjunctivae.  Beefy buccal mucosae, more on left.  No open areas.   Lungs:  CTA b/l  no wheezes  CV: RRR  no gallop  Abd; soft, NABS, protuberant  Ext/ Skin: Skin bruising and multiple skin tears notably on b/l forearms.    LE 2-3+ bilateral edema in lower extremities; wearing compression socks  Access: port-a-cath    ASSESSMENT AND PLAN   Nik Nava is a 61 year old male with Ph Pos ALL, day 448 s/p sib allo stem cell transplant    Day -6 (8/4): flu/cy  Day -5 through day -2 (8/5-8/8): flu  Day -1 (8/9): TBI  Day 0 (8/10): transplant     1.  Acute lymphoblastic leukemia, [place] chromosome positive in CR, MRD neg. S/p allo sib PBSCT.   - 1 year restaging 8/18/2022: Markedly hypocellular bone marrow [less than 10% cellular] with maturing trilineage hematopoiesis and no definite " morphologic or immunophenotypic evidence for involvement by B lymphoblastic leukemia. BCRABL1 PCR not detected, bone marrow % donor. FISH NORMAL No evidence of BCR-ABL1 fusion  - Maintenance with dasatinib pending management of GVHD.      2.  HEME:  - Pulmonary emboli: He developed a pulmonary emboli in the setting of receiving PEG asparaginase (ie provoked thrombus).  Xarelto complete.   - possible iron overload vs elevated ferritin as acute phase reactant.  Check at next visit with Avila Tobar.     3.  FEN/Renal:  - seeing nephrology today for proteinuria, hypertension  - h/o renal cancer and resection of one kidney  - Elevated left arcuate artery resistive indices suggest medical renal disease (per previous notes)    4.    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   #Skin GVHD- history of biopsy proven GVHD of the skin 8/30/2021; resolved.   # Liver cGVHD biopsy proven 2/21/2022: LFTs wnl now  # Ocular GVHD: follows with ophthalmology.  Recommend f/u with Larry soon.  # Oral GVHD: dex s/s three-4 times a day; tac ointment to lips hasnt' been needed.  Clotrimazole cream added after seeing dermatology, but no longer needing this.  Increase in prednisone recently helped the mouth sores.   Patient did not tolerate tacrolimus nor sirolimus.  Patient has been on variable steroid doses, depending on response/flares.  Most recent flare 10/11; increased pred to 40mg daily 10/12.  Now tapering down as belumosidil (x10/18) begins to help.  Reduce pred to 25mg today, 11/1.     5.  ID: Afebrile   # cat bite/scratch: doxy 100mg BID x 10 days; completes 11/3.  Bite has healed, no signs of infection.     #History of COVID   Positive via homed test 5/8. Treated with Paxlovid with resolution of symptoms.  Had recurrence on 5/18. Resolved  Covid 11/21, 12/21, due for booster will recommend locally but he would like it here     #History of pulmonary infiltrates:   4/20 CT chest with nodules; 9/22  nodules resolved on treatment dose micafungin (now off).  Continue prophylactic posaconazole.     #History of CMV viremia:    - CMV viremia: s/p rx with valtrex.  Monitor monthly.      #Ppx:  ACV  Posaconazole   Pentamidine, last 10/18, next 11/22  Azithro 250mg daily (stopped levaquin due to possible tendonitis).     #EBV viremia: 4/20 CAP CT (w/ contrast): No adenopathy. S/p 4/22 and 5/4/22 rituximab 375mg/m2.  Check every other month.    S/p covid vaccine series 12/2021  S/p shyamusheld   Deferred annual vaccines in the setting of GVHD and immunosuppression therapy    6. Endo: Hx of graves disease; On synthroid 175 mcg daily. Recheck on 10/18 T3 low at 0.03, free T3 and T4 within normal.  Per previous notes; previous provider discussed with endocrine.  Outcome of the conversation is unknown to me.     Consider starting statin at next visit; his LFTs are improved.  Will defer to Dr. Avila Tobar at her next visit.    7. CV:   -Hypertension on Norvasc: 5mg;  ACE might be a better choice, given proteinuria.  Patient to see nephrology today.      8. GI:   - Omeprazole while on oral steroids.  Denies any heartburn.     9. Psych:   - Situational anxiety - no longer on lexapro  - Insomnia: worse on steroids. Ativan, trazadone, melatonin    10. MSK: left food drop, PT. Occasional muscle cramps discussed optimizing vitamin D levels and considering vitamin D, some of the symptomatology of muscle cramps are likely related to chronic GVHD.  Not discussed 11/1/22.       Plan:    Complete doxycycline  Follow up with Dr. Juarez for eyes  Nephrology visit today  Consider adding statin at next visit      RTC   11/8 Follow-up with Avila Tobar      I spent 55 minutes in the care of this patient today, which included time necessary for preparation for the visit, obtaining history, ordering medications/tests/procedures as medically indicated, review of pertinent medical literature, counseling of the patient, communication of recommendations  to the care team, and documentation time.    Teressa Rick PA-C  11/1/2022

## 2022-11-01 NOTE — NURSING NOTE
"Oncology Rooming Note    November 1, 2022 2:25 PM   Nik Nava is a 62 year old male who presents for:    Chief Complaint   Patient presents with     Oncology Clinic Visit     [facility] RETURN - GVHD       Initial Vitals: BP (!) 148/76   Pulse 88   Temp 97.7  F (36.5  C) (Oral)   Resp 16   Ht 1.854 m (6' 0.99\")   Wt 120.2 kg (264 lb 14.4 oz)   SpO2 97%   BMI 34.96 kg/m   Estimated body mass index is 34.96 kg/m  as calculated from the following:    Height as of this encounter: 1.854 m (6' 0.99\").    Weight as of this encounter: 120.2 kg (264 lb 14.4 oz). Body surface area is 2.49 meters squared.  Mild Pain (2) Comment: Data Unavailable   No LMP for male patient.  Allergies reviewed: Yes  Medications reviewed: Yes    Medications: Medication refills not needed today.  Pharmacy name entered into [employer]:    [facility] - [place], [place] - [address] [facility] [place], [place] - [address] [place] [address] [phone]  [facility]    Kelvin Gomez LPN            "

## 2022-11-01 NOTE — PROGRESS NOTES
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 Initially on tacrolimus and was subtherapeutic through around August. Dose was increased, and patient developed NEGRA, hyperkalemia, hypomagnesemia, and tremors + cramps. Dose was decreased but patient continued to have persistent NEGRA on 9/21, at which point tacrolimus was stopped, and patient was transitioned to sirolimus. Patient again had mild NEGRA on 10/11 with increased ocular and oral GVHD flares, fluid retention (with 5-6lb weight gain), LE edema, abd distention, and elevated UPCR to 0.4. Suspected due to sirolimus toxicity, and drug was stopped. Currently on belumosudil with improvement in swelling and Cr. Also on a prednisone taper, recently changed from 30mg daily to 25mg daily as of today.     From a nephrology standpoint, patient's Cr prior to August was around 0.9. Increased to 1.84 during initial NEGRA (suspected related to tacro), recovered to 1.1, then increased again to 1.39 during second NEGRA (suspected 2/2 sirolimus toxicity). It has been slowly downtrending since stopping the sirolimus and is 1.08 today. Has been taking amlodipine for BP control since 8/2021, but patient notes continued elevated BP at home usually around 140s/80s.     Feels overall well today. Has some concerns about his blood pressure, which has been around 140s systolic lately. Was initially 160s/80s in clinic today, then 150/80 > 136/81 on recheck. Drinks a lot of iced tea (about 2x 64oz). Urinating a lot at night (almost every hour). Mildly tired. Has some weight gain, was 248 in August, now 261. Eating a lot a  night, thinks it is related to the prednisone. Still taking doxycycline for cat bite/scratch incident (last day 11/3).     - History of urinary symptoms: some nocturia  - History of Hematuria: none  - Swelling: significant LE edema for at least 1 year  - Hx of UTIs: none  - Hx of stones: none  - Rashes/Joint pain: none  - Family hx of kidney disease: father had kidney issues related to vancomycin treatment for MRSA infection  - NSAID use: denies, uses tylenol for pain       Allergies   Allergen Reactions     Sulfamethoxazole W/Trimethoprim Rash       amLODIPine (NORVASC) 5 MG tablet,   azithromycin (ZITHROMAX) 250 MG tablet, Take 1 tablet (250 mg) by mouth daily  Belumosudil Mesylate 200 MG TABS, Take 200 mg by mouth daily  Carboxymethylcell-Glycerin PF (REFRESH RELIEVA PF) 0.5-1 % SOLN, Apply 1 drop to eye 4 times daily Preservative free. Either PF multidose bottle or PF vials  clobetasol (TEMOVATE) 0.05 % external ointment, Apply twice daily as needed for sore in the mouth  dexamethasone alcohol-free (DECADRON) 0.1 MG/ML solution, Swish and spit 20 mLs (2 mg) in mouth 4 times daily  doxycycline hyclate (VIBRA-TABS) 100 MG tablet, Take 1 tablet (100 mg) by mouth 2 times daily  erythromycin (ROMYCIN) 5 MG/GM ophthalmic ointment, Place 0.5 inches into both eyes At Bedtime  fluorometholone (FML LIQUIFILM) 0.1 % ophthalmic suspension, Place 1 drop into both eyes 2 times daily For total of 21 days then stop  levothyroxine (SYNTHROID/LEVOTHROID) 175 MCG tablet, Take 1 tablet (175 mcg) by mouth daily  LORazepam (ATIVAN) 1 MG tablet, Take 1 tablet (1 mg) by mouth nightly as needed for anxiety or sleep  magnesium oxide (MAG-OX) 400 MG tablet, Take 2 tablets (800 mg) by mouth 2 times daily  melatonin 5 MG CAPS, Take 5 mg by mouth At Bedtime   nicotine polacrilex (NICORETTE) 4 MG gum, Take 4 mg by mouth as needed for smoking cessation   ofloxacin (OCUFLOX) 0.3 % ophthalmic solution, Place 1 drop into the right eye  daily  omeprazole (PRILOSEC) 40 MG DR capsule, Take 1 capsule (40 mg) by mouth daily  posaconazole (NOXAFIL) 100 MG EC tablet, Take 3 tablets (300 mg) by mouth every morning  predniSONE (DELTASONE) 10 MG tablet, Current dose is 30mg/day (x 10/25)  predniSONE (DELTASONE) 20 MG tablet, Current dose is 30mg/day (x 10/25)  predniSONE (DELTASONE) 5 MG tablet, Current dose is 30mg/day (x 10/25)  sennosides (SENOKOT) 8.6 MG tablet, Take 1 tablet by mouth 3 times daily as needed for constipation  traZODone (DESYREL) 50 MG tablet, Take 1 tablet (50 mg) by mouth At Bedtime  Belumosudil Mesylate 200 MG TABS, Take 200 mg by mouth 2 times daily (Patient not taking: Reported on 11/1/2022)    No current facility-administered medications on file prior to visit.      Past Medical History:   Diagnosis Date     Cancer (H)     renal cell     CKD (chronic kidney disease)      Thyroid disease        Past Surgical History:   Procedure Laterality Date     BACK SURGERY  2017     BONE MARROW BIOPSY, BONE SPECIMEN, NEEDLE/TROCAR Left 9/2/2021    Procedure: BIOPSY, BONE MARROW;  Surgeon: Jailyn Ny APRN CNP;  Location: [facility] OR     BONE MARROW BIOPSY, BONE SPECIMEN, NEEDLE/TROCAR Left 11/15/2021    Procedure: BIOPSY, BONE MARROW;  Surgeon: Socrates De La Torre;  Location: [facility] OR     BONE MARROW BIOPSY, BONE SPECIMEN, NEEDLE/TROCAR Left 2/7/2022    Procedure: BIOPSY, BONE MARROW;  Surgeon: Renetta Wiggins PA-C;  Location: [facility] OR     BONE MARROW BIOPSY, BONE SPECIMEN, NEEDLE/TROCAR Left 8/18/2022    Procedure: BIOPSY, BONE MARROW;  Surgeon: Lorrie Yap PA-C;  Location: [facility] OR     BRONCHOSCOPY (RIGID OR FLEXIBLE), DIAGNOSTIC N/A 4/29/2022    Procedure: BRONCHOSCOPY, WITH BRONCHOALVEOLAR LAVAGE;  Surgeon: Murtaza Garcai MD;  Location: [facility]     IR CVC TUNNEL PLACEMENT > 5 YRS OF AGE  8/4/2021     IR CVC TUNNEL REMOVAL LEFT  9/2/2021     IR LIVER BIOPSY PERCUTANEOUS  2/21/2022     PERCUTANEOUS BIOPSY LIVER N/A 2/21/2022     Procedure: NEEDLE BIOPSY, LIVER, PERCUTANEOUS;  Surgeon: Trace Castellanos MD;  [place] [facility] [place]       Social History     Tobacco Use     Smoking status: Former     Packs/day: 2.00     Years: 30.00     Pack years: 60.00     Types: Cigarettes     Quit date: 5/4/2019     Years since quitting: 3.4     Smokeless tobacco: Never   Substance Use Topics     Alcohol use: Not Currently     Drug use: Never       Family History   Problem Relation Age of Onset     Lung Cancer Mother      Polycythemia Father      Coronary Artery Disease Maternal Grandmother        ROS: A 12 system review of systems was negative other than noted here or above.     Exam:  /81   Pulse 83   Temp 97.5  F (36.4  C) (Oral)   Wt 117.3 kg (258 lb 9.6 oz)   SpO2 99%   BMI 34.13 kg/m      GENERAL APPEARANCE: alert and no distress  EYES: no scleral icterus  HENT: facial swelling around upper neck  RESP: lungs clear to auscultation   CV: regular rhythm, normal rate, no rub  Abd: distended, soft, non-tender, normoactive BS  Extremities: 2+ bilateral pitting edema up to mid-shin  SKIN: scattered lesions in upper extremities  NEURO: mentation intact and speech normal  PSYCH: affect normal/bright    Results:    No visits with results within 1 Day(s) from this visit.   Latest known visit with results is:   Lab on 10/31/2022   Component Date Value Ref Range Status     Cholesterol 10/31/2022 355 (H)  <200 mg/dL Final     Triglycerides 10/31/2022 153 (H)  <150 mg/dL Final     Direct Measure HDL 10/31/2022 148  >=40 mg/dL Final     LDL Cholesterol Calculated 10/31/2022 176 (H)  <=100 mg/dL Final     Non HDL Cholesterol 10/31/2022 207 (H)  <130 mg/dL Final     Patient Fasting > 8hrs? 10/31/2022 Yes   Final    10/30/2022 2200  10/30/2022 2200  10/30/2022 2200  10/30/2022 2200     Sodium 10/31/2022 140  133 - 144 mmol/L Final     Potassium 10/31/2022 4.5  3.4 - 5.3 mmol/L Final    Specimen slightly hemolyzed, potassium may be falsely elevated.      Chloride 10/31/2022 106  94 - 109 mmol/L Final     Carbon Dioxide (CO2) 10/31/2022 29  20 - 32 mmol/L Final     Anion Gap 10/31/2022 5  3 - 14 mmol/L Final     Urea Nitrogen 10/31/2022 44 (H)  7 - 30 mg/dL Final     Creatinine 10/31/2022 1.08  0.66 - 1.25 mg/dL Final     Calcium 10/31/2022 9.3  8.5 - 10.1 mg/dL Final     Glucose 10/31/2022 107 (H)  70 - 99 mg/dL Final     Alkaline Phosphatase 10/31/2022 113  40 - 150 U/L Final     AST 10/31/2022 34  0 - 45 U/L Final    Specimen is hemolyzed which can falsely elevate AST. Analysis of a non-hemolyzed specimen may result in a lower value.     ALT 10/31/2022 39  0 - 70 U/L Final     Protein Total 10/31/2022 6.3 (L)  6.8 - 8.8 g/dL Final     Albumin 10/31/2022 3.3 (L)  3.4 - 5.0 g/dL Final     Bilirubin Total 10/31/2022 0.9  0.2 - 1.3 mg/dL Final     GFR Estimate 10/31/2022 77  >60 mL/min/1.73m2 Final    Effective December 21, 2021 eGFRcr in adults is calculated using the 2021 CKD-EPI creatinine equation which includes age and gender (Omar et al., NE, DOI: 10.1056/BDNOay0789430)     WBC Count 10/31/2022 9.1  4.0 - 11.0 10e3/uL Final     RBC Count 10/31/2022 3.19 (L)  4.40 - 5.90 10e6/uL Final     Hemoglobin 10/31/2022 11.1 (L)  13.3 - 17.7 g/dL Final     Hematocrit 10/31/2022 32.2 (L)  40.0 - 53.0 % Final     MCV 10/31/2022 101 (H)  78 - 100 fL Final     MCH 10/31/2022 34.8 (H)  26.5 - 33.0 pg Final     MCHC 10/31/2022 34.5  31.5 - 36.5 g/dL Final     RDW 10/31/2022 16.7 (H)  10.0 - 15.0 % Final     Platelet Count 10/31/2022 146 (L)  150 - 450 10e3/uL Final     % Neutrophils 10/31/2022 62  % Final     % Lymphocytes 10/31/2022 25  % Final     % Monocytes 10/31/2022 12  % Final     % Eosinophils 10/31/2022 0  % Final     % Basophils 10/31/2022 0  % Final     % Immature Granulocytes 10/31/2022 1  % Final     NRBCs per 100 WBC 10/31/2022 1 (H)  <1 /100 Final     Absolute Neutrophils 10/31/2022 5.6  1.6 - 8.3 10e3/uL Final     Absolute Lymphocytes 10/31/2022 2.3  0.8 - 5.3  10e3/uL Final     Absolute Monocytes 10/31/2022 1.1  0.0 - 1.3 10e3/uL Final     Absolute Eosinophils 10/31/2022 0.0  0.0 - 0.7 10e3/uL Final     Absolute Basophils 10/31/2022 0.0  0.0 - 0.2 10e3/uL Final     Absolute Immature Granulocytes 10/31/2022 0.1  <=0.4 10e3/uL Final     Absolute NRBCs 10/31/2022 0.1  10e3/uL Final       Assessment/Plan:   Nik is a 62yoM w/ 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     Recent acute kidney injury  Creatinine baseline of around 0.9 prior to August 2022, with elevation to 1.84 in setting of tacrolimus therapy, was switched to sirolimus with a second mild NEGRA to 1.39 suspected 2/2 sirolimus toxicity, now improved to 1.08 since stopping sirolimus. Patient also has some mild proteinuria with UPCR of 0.41> 0.39 (10/18/2022). In setting of his persistently elevated BP, patient will benefit most from a nephrologic standpoint with tighter BP control. Given his concurrent concerns with LE swelling, we will decrease his amlodipine from 5mg to 2.5mg daily and start chlorthalidone 12.5mg daily.   - CHANGED: amlodipine 2.5 mg daily (previoulsy 5mg daily)  - START: chlorthalidone 12.5mg daily  - repeat labs in 2 weeks    LE edema  Patient notes LE edema starting over a year ago, but significantly worsened while he was on the sirolimus, now improving since stopping the med, though has not completely resolved and is still endorsing a significant amount of swelling. He is concerned as he is also continuing to gain weight steadily and has some increasing abdominal distention as well. Etiology of his edema is likely multifactorial with components of amlodipine side effect (given that it started even a year ago) and salt/fluid retention related to his ongoing steroid therapy. In setting of his elevated BP, patient will benefit from a thiazide diuretic for tighter BP control and diuresis effect. We will start chlorthalidone 12.5mg daily and  decrease amlodipine to 2.5mg daily as well. Of note, patient was previously on lasix for about 1 day, but was stopped due to concerns about intravascular volume status.   - starting chlorthalidone 12.5mg daily and decreasing amlodipine to 2.5mg daily as above    Hypertension  BP at home elevated to 140s/80s. Initial BP in clinic was 160s/80s, improved to 136/81 on recheck. Taking amlodipine 5mg daily at home. Will decrease amlodipine to 2.5mg daily and start chlorthalidone 12.5mg daily as above. Also discussed with patient to measure BP daily and report log of BP in 2 weeks.     cGVHD  Follows closely with BMT. Initially had involvement of eyes, mouth, skin, and liver. Liver and skin issues have mostly resolved. Unsure if his eyes and mouth have improved since starting the Belumosudil, but they have not worsened.     Return to clinic: in 2 months with labs    Discussed with Dr. Chew.     Petar Beebe MD  PGY1, Internal Medicine  Nephrology Clinic

## 2022-11-01 NOTE — NURSING NOTE
Chief Complaint   Patient presents with     Consult       /81   Pulse 83   Temp 97.5  F (36.4  C) (Oral)   Wt 117.3 kg (258 lb 9.6 oz)   SpO2 99%   BMI 34.13 kg/m      Regan Smith on 11/1/2022 at 3:14 PM

## 2022-11-01 NOTE — PROGRESS NOTES
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    Oncologic history: The patient was diagnosed with Ph+ ALL in 2020 after presented with feeling unwell and found to have leukocytosis and blast in his blood. He was treated with Dex and Dasatinib then 2 cycles of vincristine, prednisone, daunorubicin, and pegasparaginase with intrathecal methotrexate. Last ly, he received 1 course of high dose Jaymie-C. He achieved CR with no MRD. Subsequently, he underwent allo sib NMA (flu/cy+TBI) PBSCT on  8/10/21. hematocrit-CI was 3.  The patient was noted to be in CR with BmBx at D100, D180 and 1Y showing 100% donor. He has not been started on TKI due to previous multiple active issues. Post Tx complication included cGVHD at skin, eyes, o liver (all Bx proven, except eyes, clinically) started since 2/22. The current active cGVHDD involved eyes and skin. He was on Tacrolimus for cGVHD but later discontinued due to NEGRA, hyperkalemia hypomagnesemia, tremors and cramps in 9/22. Sirolimus was started in 9/21/22 in replacement of Tac. Cr improved, but the presented on 10/11/22 with ocular and cGVHD flare, fluid retention and gain 5-6 kg. BMT thought this is sirolimus Tox? (of note UPCR was only 0.4 and normal SAlb). Sirolimus was then stopped. Prednisone was started at 40 mg with slow tapering. He was also started on Belumosudil on 10/18/22 (ROCK2 inhibitors:Rho-associated coiled-coil kinase )  Other pertinent Hx:   - He had PE in the setting of receiving PEG asparaginase and was treated with Xarelto which is now completed.   - He had  hyperferritinemia which now being followed closely. Not on iron chelators.  - He had COVID -19 in 5/22.  - CMV viremia  - Grave disease  - HTN  Kidney history: He had RCC s/p Rt nephrectomy in 2007.  The patient has baseline creatinine of 0.9-1 pretransplant.  He developed NEGRA in 9/21 with Cr peaked at 1.89 but then come down to baseline. He had NEGRA again in 9/22 with Cr peaked at 1.8, suspect that due to tacrolimus toxicity and it was stopped. Cr improved but now still fluctuates between 1.1-1.3.  The most recent creatinine was on 10/31/2022 at 1.08.  Serum albumin is 3.3. UCPR is 0.39 g/g on 10/18/22. UA on 9/12/21 and 10/18/22 showed no protein, no blood.  However UA on 9/12/2021 showed 8 hyaline cast.     Today, he is doing well except that he still has LE edema. It has improved from when he was on Sirolimus but still quite a bit. Edema has actually started since 2021 but unclear when.But it is certainly getting worse lately when he was treated with high dose steroid and sirolimus.His cGVHD is o/eva all stable but not completely gone. He was just started Belumosudil on 10/18/22 for cGVHD. He just saw BMT today and they are decreasing his steroid.Amlodipine started since 8/21 and possibly related to his swelling. He has multiple SEs from steroid such as weight gain , central obesity, easily bruised, increased appetite and partial insomnia. He has some nocturia. He has no dysuria or hematuria. Recently, he had cat scrath incidence and being treated with doxycycline. He has abdominal distension and gained weight progressively. He drinks 120 Oz of tea per day.     PAST MEDICAL HISTORY:  Reviewed with patient on 11/01/2022     Past Medical History:   Diagnosis Date     Cancer (H)     renal cell     CKD (chronic kidney disease)      Thyroid disease        Past Surgical History:   Procedure Laterality Date     BACK SURGERY  2017     BONE MARROW BIOPSY, BONE SPECIMEN, NEEDLE/TROCAR Left 9/2/2021    Procedure: BIOPSY,  BONE MARROW;  Surgeon: Jailyn Ny APRN CNP;  Location: [facility] OR     BONE MARROW BIOPSY, BONE SPECIMEN, NEEDLE/TROCAR Left 11/15/2021    Procedure: BIOPSY, BONE MARROW;  Surgeon: Socrates De La Torre;  Location: [facility] OR     BONE MARROW BIOPSY, BONE SPECIMEN, NEEDLE/TROCAR Left 2/7/2022    Procedure: BIOPSY, BONE MARROW;  Surgeon: Renetta Wiggins PA-C;  Location: [facility] OR     BONE MARROW BIOPSY, BONE SPECIMEN, NEEDLE/TROCAR Left 8/18/2022    Procedure: BIOPSY, BONE MARROW;  Surgeon: Lorrie Yap PA-C;  Location: [facility] OR     BRONCHOSCOPY (RIGID OR FLEXIBLE), DIAGNOSTIC N/A 4/29/2022    Procedure: BRONCHOSCOPY, WITH BRONCHOALVEOLAR LAVAGE;  Surgeon: Murtaza Garcia MD;  Location: [facility]     IR CVC TUNNEL PLACEMENT > 5 YRS OF AGE  8/4/2021     IR CVC TUNNEL REMOVAL LEFT  9/2/2021     IR LIVER BIOPSY PERCUTANEOUS  2/21/2022     PERCUTANEOUS BIOPSY LIVER N/A 2/21/2022    Procedure: NEEDLE BIOPSY, LIVER, PERCUTANEOUS;  Surgeon: Trace Castellanos MD;  Location: [facility] OR        MEDICATIONS:  PTA Meds  Current Outpatient Medications   Medication     acyclovir (ZOVIRAX) 800 MG tablet     amLODIPine (NORVASC) 5 MG tablet     azithromycin (ZITHROMAX) 250 MG tablet     Belumosudil Mesylate 200 MG TABS     Carboxymethylcell-Glycerin PF (REFRESH RELIEVA PF) 0.5-1 % SOLN     chlorthalidone (HYGROTON) 25 MG tablet     clobetasol (TEMOVATE) 0.05 % external ointment     dexamethasone alcohol-free (DECADRON) 0.1 MG/ML solution     doxycycline hyclate (VIBRA-TABS) 100 MG tablet     erythromycin (ROMYCIN) 5 MG/GM ophthalmic ointment     fluorometholone (FML LIQUIFILM) 0.1 % ophthalmic suspension     levothyroxine (SYNTHROID/LEVOTHROID) 175 MCG tablet     LORazepam (ATIVAN) 1 MG tablet     magnesium oxide (MAG-OX) 400 MG tablet     melatonin 5 MG CAPS     nicotine polacrilex (NICORETTE) 4 MG gum     ofloxacin (OCUFLOX) 0.3 % ophthalmic solution     omeprazole (PRILOSEC) 40 MG DR capsule     posaconazole (NOXAFIL) 100  MG EC tablet     predniSONE (DELTASONE) 10 MG tablet     predniSONE (DELTASONE) 20 MG tablet     predniSONE (DELTASONE) 5 MG tablet     sennosides (SENOKOT) 8.6 MG tablet     traZODone (DESYREL) 50 MG tablet     Belumosudil Mesylate 200 MG TABS     No current facility-administered medications for this visit.         ALLERGIES:    Allergies   Allergen Reactions     Sulfamethoxazole W/Trimethoprim Rash       REVIEW OF SYSTEMS:  A comprehensive of systems was negative except as noted above.    SOCIAL HISTORY:   Social History     Socioeconomic History     Marital status:      Spouse name: Not on file     Number of children: Not on file     Years of education: Not on file     Highest education level: Not on file   Occupational History     Not on file   Tobacco Use     Smoking status: Former     Packs/day: 2.00     Years: 30.00     Pack years: 60.00     Types: Cigarettes     Quit date: 5/4/2019     Years since quitting: 3.4     Smokeless tobacco: Never   Substance and Sexual Activity     Alcohol use: Not Currently     Drug use: Never     Sexual activity: Not on file   Other Topics Concern     Parent/sibling w/ CABG, MI or angioplasty before 65F 55M? Not Asked   Social History Narrative     Not on file     Social Determinants of Health     Financial Resource Strain: Not on file   Food Insecurity: Not on file   Transportation Needs: Not on file   Physical Activity: Not on file   Stress: Not on file   Social Connections: Not on file   Intimate Partner Violence: Not At Risk     Fear of Current or Ex-Partner: No     Emotionally Abused: No     Physically Abused: No     Sexually Abused: No   Housing Stability: Not on file     Reviewed with patient.    FAMILY MEDICAL HISTORY:   Family History   Problem Relation Age of Onset     Lung Cancer Mother      Polycythemia Father      Coronary Artery Disease Maternal Grandmother      Reviewed with patient.    PHYSICAL EXAM:   /81   Pulse 83   Temp 97.5  F (36.4  C) (Oral)    Wt 117.3 kg (258 lb 9.6 oz)   SpO2 99%   BMI 34.13 kg/m       GENERAL APPEARANCE: No distress, awake, pleasant  EYES: No scleral icterus, pupils equal  Endo: No goiter, mild moon facies  Lymphatics: no cervical or supraclavicular LAD  Pulmonary: lungs clear to auscultation with equal breath sounds bilaterally, no clubbing  CV: regular rhythm, normal rate, no rub   - Edema: 2+ pitting edema  GI: soft, nontender, normal bowel sounds  MS: no evidence of inflammation in joints, no muscle tenderness  : No CVA tenderness  SKIN: no rash, warm, dry, no cyanosis  NEURO: face symmetric, no asterixis     LABS:   Last Renal Panel:  Sodium   Date Value Ref Range Status   10/31/2022 140 133 - 144 mmol/L Final   07/08/2021 139 133 - 144 mmol/L Final     Potassium   Date Value Ref Range Status   10/31/2022 4.5 3.4 - 5.3 mmol/L Final     Comment:     Specimen slightly hemolyzed, potassium may be falsely elevated.   07/08/2021 3.9 3.4 - 5.3 mmol/L Final     Chloride   Date Value Ref Range Status   10/31/2022 106 94 - 109 mmol/L Final   07/08/2021 108 94 - 109 mmol/L Final     Carbon Dioxide   Date Value Ref Range Status   07/08/2021 23 20 - 32 mmol/L Final     Carbon Dioxide (CO2)   Date Value Ref Range Status   10/31/2022 29 20 - 32 mmol/L Final     Anion Gap   Date Value Ref Range Status   10/31/2022 5 3 - 14 mmol/L Final   07/08/2021 7 3 - 14 mmol/L Final     Glucose   Date Value Ref Range Status   10/31/2022 107 (H) 70 - 99 mg/dL Final   07/08/2021 107 (H) 70 - 99 mg/dL Final     Urea Nitrogen   Date Value Ref Range Status   10/31/2022 44 (H) 7 - 30 mg/dL Final   07/08/2021 24 7 - 30 mg/dL Final     Creatinine   Date Value Ref Range Status   10/31/2022 1.08 0.66 - 1.25 mg/dL Final   07/08/2021 0.94 0.66 - 1.25 mg/dL Final     GFR Estimate   Date Value Ref Range Status   10/31/2022 77 >60 mL/min/1.73m2 Final     Comment:     Effective December 21, 2021 eGFRcr in adults is calculated using the 2021 CKD-EPI creatinine equation  which includes age and gender (Omar byers al., NEJ, DOI: 10.1056/LWSGat0833823)   07/08/2021 86 >60 mL/min/[1.73_m2] Final     Comment:     Non  GFR Calc  Starting 12/18/2018, serum creatinine based estimated GFR (eGFR) will be   calculated using the Chronic Kidney Disease Epidemiology Collaboration   (CKD-EPI) equation.       Calcium   Date Value Ref Range Status   10/31/2022 9.3 8.5 - 10.1 mg/dL Final   07/08/2021 8.9 8.5 - 10.1 mg/dL Final     Phosphorus   Date Value Ref Range Status   10/18/2022 2.0 (L) 2.5 - 4.5 mg/dL Final     Albumin   Date Value Ref Range Status   10/31/2022 3.3 (L) 3.4 - 5.0 g/dL Final   07/08/2021 3.7 3.4 - 5.0 g/dL Final       URINE STUDIES  Recent Labs   Lab Test 10/18/22  1546 09/12/21  1219 07/08/21  1045   COLOR Yellow Light Yellow Yellow   APPEARANCE Clear Clear Clear   URINEGLC Negative Negative Negative   URINEBILI Negative Negative Negative   URINEKETONE Negative Negative Negative   SG 1.010 1.013 1.010   UBLD Negative Negative Small*   URINEPH 7.0 5.5 5.0   PROTEIN Negative Negative Negative   NITRITE Negative Negative Negative   LEUKEST Negative Negative Negative   RBCU 0 <1 3*   WBCU 1 1 <1     No lab results found.  PTH  Recent Labs   Lab Test 10/18/22  1412   PTHI 60     IRON STUDIES  Recent Labs   Lab Test 10/18/22  1412 08/18/22  1030 07/08/21  1055   CLARE 2,348* 1,023* 762*       IMAGING:  I review the US renal renal on 10/14/2 which showed  1. CT demonstrated two left renal arteries without stenosis. Ventral  renal artery originates anterior to the dorsal renal artery. Only one  was interrogated by this study and it was not clear which it was.  Renal artery origin obscured. No left renal artery stenosis  demonstrated.     2. Right nephrectomy.     3. Elevated left arcuate artery resistive indices suggest medical  renal disease.     4. Negative grayscale appearance of left kidney.    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 However, he had worsening swelling and increased in Cr to 1.39. It is difficult to say that this is Sirolimus toxicity but his Cr and swelling improved after Sirolimus was off. Typically, sirolimus long term use is associated with proteinuria but I rarely see \"acute toxicity\" like this. It can also cause TMA but again clinically not suggestive. His UA is bland with minimal proteinuria and no blood. UCPR is 0.39 g/g. UPCR is likely from uncontrolled BP.    I think high dose steroid is likely the main culprit of HTN and swelling. However, I do believe that amlodipine and sirolimus may also contribute. At this time, the most pressing issue is his BP and swelling. As steroid dose continue to decrease, I am hopeful that swelling will get better. However, I do believe that he needs diuretics. I will start him on Chlorthalidone 12.5 mg per day and reduce amlodipine to 2.5 mg per day. He will measure BP daily and log it. We will repeat BMP in 2 weeks. If swelling not improve, we may increase chlorthalidone further IF cr and electrolytes stable. I may add low dose ACEi/ARB later. In the meantime, I discussed about low salt diet and reduce amount of green tea intake (120 Oz currently)  - Start Chlorthalidone 12.5 mg daily and reduce amlodipine to 2.5 mg per day  - BP daily  - BMP in 2 weeks  - Low salt diet and reduce ice tea  - Next step: may increase chlorthalidone if Cr stable but if not improving in swelling, may start torsemide    Follow-up in 2 month     I spent 100 minutes on the " date of the encounter doing chart review, history and exam, documentation and further activities as noted above. 30 minutes of this visit is dedicated to direct patient interaction via face to face.    Keegan Chew MD on 11/1/2022 at 10:08 AM

## 2022-11-01 NOTE — LETTER
"    11/1/2022         RE: Nik Nava  [address] [facility] [id number]        Dear Colleague,    Thank you for referring your patient, Nik Nava, to the [facility]. Please see a copy of my visit note below.        BMT Clinic Note  11/1/2022     ID:  Nik Nava is a 62 yo man D+448 s/p NMA allo sib PBSCT for Ph+ ALL, cGVHD enrolled on PQRST study.    HPI:  Eyes still bothersome, especially the right one.  Mouth improved on prednisone.  Continues with dex s/s as well.  Legs still swollen; less so in the morning. Urinates a lot at night.  Eating a lot, craving carbs.         Review of Systems                                                                                                                                         8 point Review of systems was negative except as detailed above     PHYSICAL EXAM                                                                                                                                                 KPS: 80  BP (!) 148/76   Pulse 88   Temp 97.7  F (36.5  C) (Oral)   Resp 16   Ht 1.854 m (6' 0.99\")   Wt 120.2 kg (264 lb 14.4 oz)   SpO2 97%   BMI 34.96 kg/m      Wt Readings from Last 4 Encounters:   11/01/22 120.2 kg (264 lb 14.4 oz)   10/25/22 117.9 kg (260 lb)   10/18/22 117.9 kg (260 lb)   10/14/22 114.2 kg (251 lb 11.2 oz)      General: NAD.  HEENT: sclera anicteric, injected conjunctivae.  Beefy buccal mucosae, more on left.  No open areas.   Lungs:  CTA b/l  no wheezes  CV: RRR  no gallop  Abd; soft, NABS, protuberant  Ext/ Skin: Skin bruising and multiple skin tears notably on b/l forearms.    LE 2-3+ bilateral edema in lower extremities; wearing compression socks  Access: port-a-cath    ASSESSMENT AND PLAN   Nik Nava is a 61 year old male with Ph Pos ALL, day 448 s/p sib allo stem cell transplant    Day -6 (8/4): flu/cy  Day -5 through day -2 (8/5-8/8): flu  Day -1 (8/9): " TBI  Day 0 (8/10): transplant     1.  Acute lymphoblastic leukemia, [place] chromosome positive in CR, MRD neg. S/p allo sib PBSCT.   - 1 year restaging 8/18/2022: Markedly hypocellular bone marrow [less than 10% cellular] with maturing trilineage hematopoiesis and no definite morphologic or immunophenotypic evidence for involvement by B lymphoblastic leukemia. BCRABL1 PCR not detected, bone marrow % donor. FISH NORMAL No evidence of BCR-ABL1 fusion  - Maintenance with dasatinib pending management of GVHD.      2.  HEME:  - Pulmonary emboli: He developed a pulmonary emboli in the setting of receiving PEG asparaginase (ie provoked thrombus).  Xarelto complete.   - possible iron overload vs elevated ferritin as acute phase reactant.  Check at next visit with Avila Tobar.     3.  FEN/Renal:  - seeing nephrology today for proteinuria, hypertension  - h/o renal cancer and resection of one kidney  - Elevated left arcuate artery resistive indices suggest medical renal disease (per previous notes)    4.    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   #Skin GVHD- history of biopsy proven GVHD of the skin 8/30/2021; resolved.   # Liver cGVHD biopsy proven 2/21/2022: LFTs wnl now  # Ocular GVHD: follows with ophthalmology.  Recommend f/u with Larry soon.  # Oral GVHD: dex s/s three-4 times a day; tac ointment to lips hasnt' been needed.  Clotrimazole cream added after seeing dermatology, but no longer needing this.  Increase in prednisone recently helped the mouth sores.   Patient did not tolerate tacrolimus nor sirolimus.  Patient has been on variable steroid doses, depending on response/flares.  Most recent flare 10/11; increased pred to 40mg daily 10/12.  Now tapering down as belumosidil (x10/18) begins to help.  Reduce pred to 25mg today, 11/1.     5.  ID: Afebrile   # cat bite/scratch: doxy 100mg BID x 10 days; completes 11/3.  Bite has healed, no signs of infection.     #History  of COVID   Positive via homed test 5/8. Treated with Paxlovid with resolution of symptoms.  Had recurrence on 5/18. Resolved  Covid 11/21, 12/21, due for booster will recommend locally but he would like it here     #History of pulmonary infiltrates:   4/20 CT chest with nodules; 9/22 nodules resolved on treatment dose micafungin (now off).  Continue prophylactic posaconazole.     #History of CMV viremia:    - CMV viremia: s/p rx with valtrex.  Monitor monthly.      #Ppx:  ACV  Posaconazole   Pentamidine, last 10/18, next 11/22  Azithro 250mg daily (stopped levaquin due to possible tendonitis).     #EBV viremia: 4/20 CAP CT (w/ contrast): No adenopathy. S/p 4/22 and 5/4/22 rituximab 375mg/m2.  Check every other month.    S/p covid vaccine series 12/2021  S/p evusheld   Deferred annual vaccines in the setting of GVHD and immunosuppression therapy    6. Endo: Hx of graves disease; On synthroid 175 mcg daily. Recheck on 10/18 T3 low at 0.03, free T3 and T4 within normal.  Per previous notes; previous provider discussed with endocrine.  Outcome of the conversation is unknown to me.     Consider starting statin at next visit; his LFTs are improved.  Will defer to Dr. Avila Tobar at her next visit.    7. CV:   -Hypertension on Norvasc: 5mg;  ACE might be a better choice, given proteinuria.  Patient to see nephrology today.      8. GI:   - Omeprazole while on oral steroids.  Denies any heartburn.     9. Psych:   - Situational anxiety - no longer on lexapro  - Insomnia: worse on steroids. Ativan, trazadone, melatonin    10. MSK: left food drop, PT. Occasional muscle cramps discussed optimizing vitamin D levels and considering vitamin D, some of the symptomatology of muscle cramps are likely related to chronic GVHD.  Not discussed 11/1/22.       Plan:    Complete doxycycline  Follow up with Dr. Juarez for eyes  Nephrology visit today  Consider adding statin at next visit      RTC   11/8 Follow-up with Avila Tobar      I spent 55  minutes in the care of this patient today, which included time necessary for preparation for the visit, obtaining history, ordering medications/tests/procedures as medically indicated, review of pertinent medical literature, counseling of the patient, communication of recommendations to the care team, and documentation time.    Teressa Rick PA-C  11/1/2022        Again, thank you for allowing me to participate in the care of your patient.      Sincerely,    No name on file

## 2022-11-01 NOTE — LETTER
11/1/2022       RE: Nik Nava  [id number] [address]  [facility] [id number]     Dear Colleague,    Thank you for referring your patient, Nik Nava, to the [facility] [place] at [facility]. Please see a copy of my visit note below.      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    Oncologic history: The patient was diagnosed with Ph+ ALL in 2020 after presented with feeling unwell and found to have leukocytosis and blast in his blood. He was treated with Dex and Dasatinib then 2 cycles of vincristine, prednisone, daunorubicin, and pegasparaginase with intrathecal methotrexate. Last ly, he received 1 course of high dose Jaymie-C. He achieved CR with no MRD. Subsequently, he underwent allo sib NMA (flu/cy+TBI) PBSCT on  8/10/21. hematocrit-CI was 3.  The patient was noted to be in CR with BmBx at D100, D180 and 1Y showing 100% donor. He has not been started on TKI due to previous multiple active issues. Post Tx complication included cGVHD at skin, eyes, o liver (all Bx proven, except eyes, clinically) started since 2/22. The current active cGVHDD involved eyes and skin. He was on Tacrolimus for cGVHD but later discontinued due to NEGRA, hyperkalemia hypomagnesemia, tremors and cramps in 9/22. Sirolimus was started in 9/21/22 in replacement of Tac. Cr improved, but the presented on 10/11/22 with ocular and cGVHD flare, fluid retention and gain 5-6 kg. BMT thought this is sirolimus Tox? (of note UPCR was only 0.4 and normal SAlb). Sirolimus was  then stopped. Prednisone was started at 40 mg with slow tapering. He was also started on Belumosudil on 10/18/22 (ROCK2 inhibitors:Rho-associated coiled-coil kinase )  Other pertinent Hx:   - He had PE in the setting of receiving PEG asparaginase and was treated with Xarelto which is now completed.   - He had hyperferritinemia which now being followed closely. Not on iron chelators.  - He had COVID -19 in 5/22.  - CMV viremia  - Grave disease  - HTN  Kidney history: He had RCC s/p Rt nephrectomy in 2007.  The patient has baseline creatinine of 0.9-1 pretransplant.  He developed NEGRA in 9/21 with Cr peaked at 1.89 but then come down to baseline. He had NEGRA again in 9/22 with Cr peaked at 1.8, suspect that due to tacrolimus toxicity and it was stopped. Cr improved but now still fluctuates between 1.1-1.3.  The most recent creatinine was on 10/31/2022 at 1.08.  Serum albumin is 3.3. UCPR is 0.39 g/g on 10/18/22. UA on 9/12/21 and 10/18/22 showed no protein, no blood.  However UA on 9/12/2021 showed 8 hyaline cast.     Today, he is doing well except that he still has LE edema. It has improved from when he was on Sirolimus but still quite a bit. Edema has actually started since 2021 but unclear when.But it is certainly getting worse lately when he was treated with high dose steroid and sirolimus.His cGVHD is o/eva all stable but not completely gone. He was just started Belumosudil on 10/18/22 for cGVHD. He just saw BMT today and they are decreasing his steroid.Amlodipine started since 8/21 and possibly related to his swelling. He has multiple SEs from steroid such as weight gain , central obesity, easily bruised, increased appetite and partial insomnia. He has some nocturia. He has no dysuria or hematuria. Recently, he had cat scrath incidence and being treated with doxycycline. He has abdominal distension and gained weight progressively. He drinks 120 Oz of tea per day.     PAST MEDICAL HISTORY:  Reviewed with patient on  11/01/2022     Past Medical History:   Diagnosis Date     Cancer (H)     renal cell     CKD (chronic kidney disease)      Thyroid disease        Past Surgical History:   Procedure Laterality Date     BACK SURGERY  2017     BONE MARROW BIOPSY, BONE SPECIMEN, NEEDLE/TROCAR Left 9/2/2021    Procedure: BIOPSY, BONE MARROW;  Surgeon: Jailyn Ny APRN CNP;  Location: [facility] OR     BONE MARROW BIOPSY, BONE SPECIMEN, NEEDLE/TROCAR Left 11/15/2021    Procedure: BIOPSY, BONE MARROW;  Surgeon: Socrates De La Torre;  Location: [facility] OR     BONE MARROW BIOPSY, BONE SPECIMEN, NEEDLE/TROCAR Left 2/7/2022    Procedure: BIOPSY, BONE MARROW;  Surgeon: Renetta Wiggins PA-C;  Location: [facility] OR     BONE MARROW BIOPSY, BONE SPECIMEN, NEEDLE/TROCAR Left 8/18/2022    Procedure: BIOPSY, BONE MARROW;  Surgeon: Lorrie Yap PA-C;  Location: [facility] OR     BRONCHOSCOPY (RIGID OR FLEXIBLE), DIAGNOSTIC N/A 4/29/2022    Procedure: BRONCHOSCOPY, WITH BRONCHOALVEOLAR LAVAGE;  Surgeon: Murtaza Garcia MD;  Location: [facility]     IR CVC TUNNEL PLACEMENT > 5 YRS OF AGE  8/4/2021     IR CVC TUNNEL REMOVAL LEFT  9/2/2021     IR LIVER BIOPSY PERCUTANEOUS  2/21/2022     PERCUTANEOUS BIOPSY LIVER N/A 2/21/2022    Procedure: NEEDLE BIOPSY, LIVER, PERCUTANEOUS;  Surgeon: Trace Castellanos MD;  Location: [facility] OR        MEDICATIONS:  PTA Meds  Current Outpatient Medications   Medication     acyclovir (ZOVIRAX) 800 MG tablet     amLODIPine (NORVASC) 5 MG tablet     azithromycin (ZITHROMAX) 250 MG tablet     Belumosudil Mesylate 200 MG TABS     Carboxymethylcell-Glycerin PF (REFRESH RELIEVA PF) 0.5-1 % SOLN     chlorthalidone (HYGROTON) 25 MG tablet     clobetasol (TEMOVATE) 0.05 % external ointment     dexamethasone alcohol-free (DECADRON) 0.1 MG/ML solution     doxycycline hyclate (VIBRA-TABS) 100 MG tablet     erythromycin (ROMYCIN) 5 MG/GM ophthalmic ointment     fluorometholone (FML LIQUIFILM) 0.1 % ophthalmic suspension     levothyroxine  (SYNTHROID/LEVOTHROID) 175 MCG tablet     LORazepam (ATIVAN) 1 MG tablet     magnesium oxide (MAG-OX) 400 MG tablet     melatonin 5 MG CAPS     nicotine polacrilex (NICORETTE) 4 MG gum     ofloxacin (OCUFLOX) 0.3 % ophthalmic solution     omeprazole (PRILOSEC) 40 MG DR capsule     posaconazole (NOXAFIL) 100 MG EC tablet     predniSONE (DELTASONE) 10 MG tablet     predniSONE (DELTASONE) 20 MG tablet     predniSONE (DELTASONE) 5 MG tablet     sennosides (SENOKOT) 8.6 MG tablet     traZODone (DESYREL) 50 MG tablet     Belumosudil Mesylate 200 MG TABS     No current facility-administered medications for this visit.         ALLERGIES:    Allergies   Allergen Reactions     Sulfamethoxazole W/Trimethoprim Rash       REVIEW OF SYSTEMS:  A comprehensive of systems was negative except as noted above.    SOCIAL HISTORY:   Social History     Socioeconomic History     Marital status:      Spouse name: Not on file     Number of children: Not on file     Years of education: Not on file     Highest education level: Not on file   Occupational History     Not on file   Tobacco Use     Smoking status: Former     Packs/day: 2.00     Years: 30.00     Pack years: 60.00     Types: Cigarettes     Quit date: 5/4/2019     Years since quitting: 3.4     Smokeless tobacco: Never   Substance and Sexual Activity     Alcohol use: Not Currently     Drug use: Never     Sexual activity: Not on file   Other Topics Concern     Parent/sibling w/ CABG, MI or angioplasty before 65F 55M? Not Asked   Social History Narrative     Not on file     Social Determinants of Health     Financial Resource Strain: Not on file   Food Insecurity: Not on file   Transportation Needs: Not on file   Physical Activity: Not on file   Stress: Not on file   Social Connections: Not on file   Intimate Partner Violence: Not At Risk     Fear of Current or Ex-Partner: No     Emotionally Abused: No     Physically Abused: No     Sexually Abused: No   Housing Stability: Not on  file     Reviewed with patient.    FAMILY MEDICAL HISTORY:   Family History   Problem Relation Age of Onset     Lung Cancer Mother      Polycythemia Father      Coronary Artery Disease Maternal Grandmother      Reviewed with patient.    PHYSICAL EXAM:   /81   Pulse 83   Temp 97.5  F (36.4  C) (Oral)   Wt 117.3 kg (258 lb 9.6 oz)   SpO2 99%   BMI 34.13 kg/m       GENERAL APPEARANCE: No distress, awake, pleasant  EYES: No scleral icterus, pupils equal  Endo: No goiter, mild moon facies  Lymphatics: no cervical or supraclavicular LAD  Pulmonary: lungs clear to auscultation with equal breath sounds bilaterally, no clubbing  CV: regular rhythm, normal rate, no rub   - Edema: 2+ pitting edema  GI: soft, nontender, normal bowel sounds  MS: no evidence of inflammation in joints, no muscle tenderness  : No CVA tenderness  SKIN: no rash, warm, dry, no cyanosis  NEURO: face symmetric, no asterixis     LABS:   Last Renal Panel:  Sodium   Date Value Ref Range Status   10/31/2022 140 133 - 144 mmol/L Final   07/08/2021 139 133 - 144 mmol/L Final     Potassium   Date Value Ref Range Status   10/31/2022 4.5 3.4 - 5.3 mmol/L Final     Comment:     Specimen slightly hemolyzed, potassium may be falsely elevated.   07/08/2021 3.9 3.4 - 5.3 mmol/L Final     Chloride   Date Value Ref Range Status   10/31/2022 106 94 - 109 mmol/L Final   07/08/2021 108 94 - 109 mmol/L Final     Carbon Dioxide   Date Value Ref Range Status   07/08/2021 23 20 - 32 mmol/L Final     Carbon Dioxide (CO2)   Date Value Ref Range Status   10/31/2022 29 20 - 32 mmol/L Final     Anion Gap   Date Value Ref Range Status   10/31/2022 5 3 - 14 mmol/L Final   07/08/2021 7 3 - 14 mmol/L Final     Glucose   Date Value Ref Range Status   10/31/2022 107 (H) 70 - 99 mg/dL Final   07/08/2021 107 (H) 70 - 99 mg/dL Final     Urea Nitrogen   Date Value Ref Range Status   10/31/2022 44 (H) 7 - 30 mg/dL Final   07/08/2021 24 7 - 30 mg/dL Final     Creatinine   Date  Value Ref Range Status   10/31/2022 1.08 0.66 - 1.25 mg/dL Final   07/08/2021 0.94 0.66 - 1.25 mg/dL Final     GFR Estimate   Date Value Ref Range Status   10/31/2022 77 >60 mL/min/1.73m2 Final     Comment:     Effective December 21, 2021 eGFRcr in adults is calculated using the 2021 CKD-EPI creatinine equation which includes age and gender (Omar et al., NE, DOI: 10.[facility]6/[id number])   07/08/2021 86 >60 mL/min/[1.73_m2] Final     Comment:     Non  GFR Calc  Starting 12/18/2018, serum creatinine based estimated GFR (eGFR) will be   calculated using the Chronic Kidney Disease Epidemiology Collaboration   (CKD-EPI) equation.       Calcium   Date Value Ref Range Status   10/31/2022 9.3 8.5 - 10.1 mg/dL Final   07/08/2021 8.9 8.5 - 10.1 mg/dL Final     Phosphorus   Date Value Ref Range Status   10/18/2022 2.0 (L) 2.5 - 4.5 mg/dL Final     Albumin   Date Value Ref Range Status   10/31/2022 3.3 (L) 3.4 - 5.0 g/dL Final   07/08/2021 3.7 3.4 - 5.0 g/dL Final       URINE STUDIES  Recent Labs   Lab Test 10/18/22  1546 09/12/21  1219 07/08/21  1045   COLOR Yellow Light Yellow Yellow   APPEARANCE Clear Clear Clear   URINEGLC Negative Negative Negative   URINEBILI Negative Negative Negative   URINEKETONE Negative Negative Negative   SG 1.010 1.013 1.010   UBLD Negative Negative Small*   URINEPH 7.0 5.5 5.0   PROTEIN Negative Negative Negative   NITRITE Negative Negative Negative   LEUKEST Negative Negative Negative   RBCU 0 <1 3*   WBCU 1 1 <1     No lab results found.  PTH  Recent Labs   Lab Test 10/18/22  1412   PTHI 60     IRON STUDIES  Recent Labs   Lab Test 10/18/22  1412 08/18/22  1030 07/08/21  1055   CLARE 2,348* 1,023* 762*       IMAGING:  I review the US renal renal on 10/14/2 which showed  1. CT demonstrated two left renal arteries without stenosis. Ventral  renal artery originates anterior to the dorsal renal artery. Only one  was interrogated by this study and it was not clear which it was.  Renal  "artery origin obscured. No left renal artery stenosis  demonstrated.     2. Right nephrectomy.     3. Elevated left arcuate artery resistive indices suggest medical  renal disease.     4. Negative grayscale appearance of left kidney.    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 However, he had worsening swelling and increased in Cr to 1.39. It is difficult to say that this is Sirolimus toxicity but his Cr and swelling improved after Sirolimus was off. Typically, sirolimus long term use is associated with proteinuria but I rarely see \"acute toxicity\" like this. It can also cause TMA but again clinically not suggestive. His UA is bland with minimal proteinuria and no blood. UCPR is 0.39 g/g. UPCR is likely from uncontrolled BP.    I think high dose steroid is likely the main culprit of HTN and swelling. However, I do believe that amlodipine and sirolimus may also contribute. At this time, the most pressing issue is his BP and swelling. As steroid dose continue to decrease, I am hopeful that swelling will get better. However, I do believe that he needs diuretics. I will start him on Chlorthalidone 12.5 mg per day and reduce amlodipine to 2.5 mg per day. He will measure BP daily and log it. We will repeat BMP in 2 weeks. If swelling not improve, we may increase chlorthalidone further IF cr and electrolytes stable. I may add low dose ACEi/ARB later. In the meantime, I discussed about low salt diet and reduce amount of green tea intake (120 Oz " currently)  - Start Chlorthalidone 12.5 mg daily and reduce amlodipine to 2.5 mg per day  - BP daily  - BMP in 2 weeks  - Low salt diet and reduce ice tea  - Next step: may increase chlorthalidone if Cr stable but if not improving in swelling, may start torsemide    Follow-up in 2 month     I spent 100 minutes on the date of the encounter doing chart review, history and exam, documentation and further activities as noted above. 30 minutes of this visit is dedicated to direct patient interaction via face to face.    Keegan Chew MD on 11/1/2022 at 10:08 AM              Nephrology Clinic Note  November 1, 2022    CC: recent NEGRA    HPI: Nik Nava is a 62 year old male with 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 Initially on tacrolimus and was subtherapeutic through around August. Dose was increased, and patient developed NEGRA, hyperkalemia, hypomagnesemia, and tremors + cramps. Dose was decreased but patient continued to have persistent NEGRA on 9/21, at which point tacrolimus was stopped, and patient was transitioned to sirolimus. Patient again had mild NEGRA on 10/11 with increased ocular and oral GVHD flares, fluid retention (with 5-6lb weight gain), LE edema, abd distention, and elevated UPCR to 0.4. Suspected due to sirolimus toxicity, and drug was stopped. Currently on belumosudil with improvement in swelling and Cr. Also on a prednisone taper, recently changed from 30mg daily to 25mg daily as of today.     From a nephrology standpoint, patient's Cr prior to August was around 0.9. Increased to 1.84 during initial NEGRA (suspected related to tacro), recovered to 1.1, then increased again to 1.39 during second NEGRA (suspected 2/2 sirolimus  toxicity). It has been slowly downtrending since stopping the sirolimus and is 1.08 today. Has been taking amlodipine for BP control since 8/2021, but patient notes continued elevated BP at home usually around 140s/80s.     Feels overall well today. Has some concerns about his blood pressure, which has been around 140s systolic lately. Was initially 160s/80s in clinic today, then 150/80 > 136/81 on recheck. Drinks a lot of iced tea (about 2x 64oz). Urinating a lot at night (almost every hour). Mildly tired. Has some weight gain, was 248 in August, now 261. Eating a lot a night, thinks it is related to the prednisone. Still taking doxycycline for cat bite/scratch incident (last day 11/3).     - History of urinary symptoms: some nocturia  - History of Hematuria: none  - Swelling: significant LE edema for at least 1 year  - Hx of UTIs: none  - Hx of stones: none  - Rashes/Joint pain: none  - Family hx of kidney disease: father had kidney issues related to vancomycin treatment for MRSA infection  - NSAID use: denies, uses tylenol for pain       Allergies   Allergen Reactions     Sulfamethoxazole W/Trimethoprim Rash       amLODIPine (NORVASC) 5 MG tablet,   azithromycin (ZITHROMAX) 250 MG tablet, Take 1 tablet (250 mg) by mouth daily  Belumosudil Mesylate 200 MG TABS, Take 200 mg by mouth daily  Carboxymethylcell-Glycerin PF (REFRESH RELIEVA PF) 0.5-1 % SOLN, Apply 1 drop to eye 4 times daily Preservative free. Either PF multidose bottle or PF vials  clobetasol (TEMOVATE) 0.05 % external ointment, Apply twice daily as needed for sore in the mouth  dexamethasone alcohol-free (DECADRON) 0.1 MG/ML solution, Swish and spit 20 mLs (2 mg) in mouth 4 times daily  doxycycline hyclate (VIBRA-TABS) 100 MG tablet, Take 1 tablet (100 mg) by mouth 2 times daily  erythromycin (ROMYCIN) 5 MG/GM ophthalmic ointment, Place 0.5 inches into both eyes At Bedtime  fluorometholone (FML LIQUIFILM) 0.1 % ophthalmic suspension, Place 1 drop into  both eyes 2 times daily For total of 21 days then stop  levothyroxine (SYNTHROID/LEVOTHROID) 175 MCG tablet, Take 1 tablet (175 mcg) by mouth daily  LORazepam (ATIVAN) 1 MG tablet, Take 1 tablet (1 mg) by mouth nightly as needed for anxiety or sleep  magnesium oxide (MAG-OX) 400 MG tablet, Take 2 tablets (800 mg) by mouth 2 times daily  melatonin 5 MG CAPS, Take 5 mg by mouth At Bedtime   nicotine polacrilex (NICORETTE) 4 MG gum, Take 4 mg by mouth as needed for smoking cessation   ofloxacin (OCUFLOX) 0.3 % ophthalmic solution, Place 1 drop into the right eye daily  omeprazole (PRILOSEC) 40 MG DR capsule, Take 1 capsule (40 mg) by mouth daily  posaconazole (NOXAFIL) 100 MG EC tablet, Take 3 tablets (300 mg) by mouth every morning  predniSONE (DELTASONE) 10 MG tablet, Current dose is 30mg/day (x 10/25)  predniSONE (DELTASONE) 20 MG tablet, Current dose is 30mg/day (x 10/25)  predniSONE (DELTASONE) 5 MG tablet, Current dose is 30mg/day (x 10/25)  sennosides (SENOKOT) 8.6 MG tablet, Take 1 tablet by mouth 3 times daily as needed for constipation  traZODone (DESYREL) 50 MG tablet, Take 1 tablet (50 mg) by mouth At Bedtime  Belumosudil Mesylate 200 MG TABS, Take 200 mg by mouth 2 times daily (Patient not taking: Reported on 11/1/2022)    No current facility-administered medications on file prior to visit.      Past Medical History:   Diagnosis Date     Cancer (H)     renal cell     CKD (chronic kidney disease)      Thyroid disease        Past Surgical History:   Procedure Laterality Date     BACK SURGERY  2017     BONE MARROW BIOPSY, BONE SPECIMEN, NEEDLE/TROCAR Left 9/2/2021    Procedure: BIOPSY, BONE MARROW;  Surgeon: Jailyn Ny APRN CNP;  Location: [facility] OR     BONE MARROW BIOPSY, BONE SPECIMEN, NEEDLE/TROCAR Left 11/15/2021    Procedure: BIOPSY, BONE MARROW;  Surgeon: Socrates De La Torre;  Location: [facility] OR     BONE MARROW BIOPSY, BONE SPECIMEN, NEEDLE/TROCAR Left 2/7/2022    Procedure: BIOPSY, BONE  MARROW;  Surgeon: Renetta Wiggins PA-C;  Location: [facility] OR     BONE MARROW BIOPSY, BONE SPECIMEN, NEEDLE/TROCAR Left 8/18/2022    Procedure: BIOPSY, BONE MARROW;  Surgeon: Lorrie Yap PA-C;  Location: [facility] OR     BRONCHOSCOPY (RIGID OR FLEXIBLE), DIAGNOSTIC N/A 4/29/2022    Procedure: BRONCHOSCOPY, WITH BRONCHOALVEOLAR LAVAGE;  Surgeon: Murtaza Garcia MD;  Location: [facility]     IR CVC TUNNEL PLACEMENT > 5 YRS OF AGE  8/4/2021     IR CVC TUNNEL REMOVAL LEFT  9/2/2021     IR LIVER BIOPSY PERCUTANEOUS  2/21/2022     PERCUTANEOUS BIOPSY LIVER N/A 2/21/2022    Procedure: NEEDLE BIOPSY, LIVER, PERCUTANEOUS;  Surgeon: Trace Castellanos MD;  Location: [facility] OR       Social History     Tobacco Use     Smoking status: Former     Packs/day: 2.00     Years: 30.00     Pack years: 60.00     Types: Cigarettes     Quit date: 5/4/2019     Years since quitting: 3.4     Smokeless tobacco: Never   Substance Use Topics     Alcohol use: Not Currently     Drug use: Never       Family History   Problem Relation Age of Onset     Lung Cancer Mother      Polycythemia Father      Coronary Artery Disease Maternal Grandmother        ROS: A 12 system review of systems was negative other than noted here or above.     Exam:  /81   Pulse 83   Temp 97.5  F (36.4  C) (Oral)   Wt 117.3 kg (258 lb 9.6 oz)   SpO2 99%   BMI 34.13 kg/m      GENERAL APPEARANCE: alert and no distress  EYES: no scleral icterus  HENT: facial swelling around upper neck  RESP: lungs clear to auscultation   CV: regular rhythm, normal rate, no rub  Abd: distended, soft, non-tender, normoactive BS  Extremities: 2+ bilateral pitting edema up to mid-shin  SKIN: scattered lesions in upper extremities  NEURO: mentation intact and speech normal  PSYCH: affect normal/bright    Results:    No visits with results within 1 Day(s) from this visit.   Latest known visit with results is:   Lab on 10/31/2022   Component Date Value Ref Range Status     Cholesterol  10/31/2022 355 (H)  <200 mg/dL Final     Triglycerides 10/31/2022 153 (H)  <150 mg/dL Final     Direct Measure HDL 10/31/2022 148  >=40 mg/dL Final     LDL Cholesterol Calculated 10/31/2022 176 (H)  <=100 mg/dL Final     Non HDL Cholesterol 10/31/2022 207 (H)  <130 mg/dL Final     Patient Fasting > 8hrs? 10/31/2022 Yes   Final    10/30/2022 2200  10/30/2022 2200  10/30/2022 2200  10/30/2022 2200     Sodium 10/31/2022 140  133 - 144 mmol/L Final     Potassium 10/31/2022 4.5  3.4 - 5.3 mmol/L Final    Specimen slightly hemolyzed, potassium may be falsely elevated.     Chloride 10/31/2022 106  94 - 109 mmol/L Final     Carbon Dioxide (CO2) 10/31/2022 29  20 - 32 mmol/L Final     Anion Gap 10/31/2022 5  3 - 14 mmol/L Final     Urea Nitrogen 10/31/2022 44 (H)  7 - 30 mg/dL Final     Creatinine 10/31/2022 1.08  0.66 - 1.25 mg/dL Final     Calcium 10/31/2022 9.3  8.5 - 10.1 mg/dL Final     Glucose 10/31/2022 107 (H)  70 - 99 mg/dL Final     Alkaline Phosphatase 10/31/2022 113  40 - 150 U/L Final     AST 10/31/2022 34  0 - 45 U/L Final    Specimen is hemolyzed which can falsely elevate AST. Analysis of a non-hemolyzed specimen may result in a lower value.     ALT 10/31/2022 39  0 - 70 U/L Final     Protein Total 10/31/2022 6.3 (L)  6.8 - 8.8 g/dL Final     Albumin 10/31/2022 3.3 (L)  3.4 - 5.0 g/dL Final     Bilirubin Total 10/31/2022 0.9  0.2 - 1.3 mg/dL Final     GFR Estimate 10/31/2022 77  >60 mL/min/1.73m2 Final    Effective December 21, 2021 eGFRcr in adults is calculated using the 2021 CKD-EPI creatinine equation which includes age and gender ( et al., NEJ, DOI: 10.1056/QYYBbe4983022)     WBC Count 10/31/2022 9.1  4.0 - 11.0 10e3/uL Final     RBC Count 10/31/2022 3.19 (L)  4.40 - 5.90 10e6/uL Final     Hemoglobin 10/31/2022 11.1 (L)  13.3 - 17.7 g/dL Final     Hematocrit 10/31/2022 32.2 (L)  40.0 - 53.0 % Final     MCV 10/31/2022 101 (H)  78 - 100 fL Final     MCH 10/31/2022 34.8 (H)  26.5 - 33.0 pg Final      MCHC 10/31/2022 34.5  31.5 - 36.5 g/dL Final     RDW 10/31/2022 16.7 (H)  10.0 - 15.0 % Final     Platelet Count 10/31/2022 146 (L)  150 - 450 10e3/uL Final     % Neutrophils 10/31/2022 62  % Final     % Lymphocytes 10/31/2022 25  % Final     % Monocytes 10/31/2022 12  % Final     % Eosinophils 10/31/2022 0  % Final     % Basophils 10/31/2022 0  % Final     % Immature Granulocytes 10/31/2022 1  % Final     NRBCs per 100 WBC 10/31/2022 1 (H)  <1 /100 Final     Absolute Neutrophils 10/31/2022 5.6  1.6 - 8.3 10e3/uL Final     Absolute Lymphocytes 10/31/2022 2.3  0.8 - 5.3 10e3/uL Final     Absolute Monocytes 10/31/2022 1.1  0.0 - 1.3 10e3/uL Final     Absolute Eosinophils 10/31/2022 0.0  0.0 - 0.7 10e3/uL Final     Absolute Basophils 10/31/2022 0.0  0.0 - 0.2 10e3/uL Final     Absolute Immature Granulocytes 10/31/2022 0.1  <=0.4 10e3/uL Final     Absolute NRBCs 10/31/2022 0.1  10e3/uL Final       Assessment/Plan:   Nik is a 62yoM w/ 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     Recent acute kidney injury  Creatinine baseline of around 0.9 prior to August 2022, with elevation to 1.84 in setting of tacrolimus therapy, was switched to sirolimus with a second mild NEGRA to 1.39 suspected 2/2 sirolimus toxicity, now improved to 1.08 since stopping sirolimus. Patient also has some mild proteinuria with UPCR of 0.41> 0.39 (10/18/2022). In setting of his persistently elevated BP, patient will benefit most from a nephrologic standpoint with tighter BP control. Given his concurrent concerns with LE swelling, we will decrease his amlodipine from 5mg to 2.5mg daily and start chlorthalidone 12.5mg daily.   - CHANGED: amlodipine 2.5 mg daily (previoulsy 5mg daily)  - START: chlorthalidone 12.5mg daily  - repeat labs in 2 weeks    LE edema  Patient notes LE edema starting over a year ago, but significantly worsened while he was on the sirolimus, now improving since stopping the med,  though has not completely resolved and is still endorsing a significant amount of swelling. He is concerned as he is also continuing to gain weight steadily and has some increasing abdominal distention as well. Etiology of his edema is likely multifactorial with components of amlodipine side effect (given that it started even a year ago) and salt/fluid retention related to his ongoing steroid therapy. In setting of his elevated BP, patient will benefit from a thiazide diuretic for tighter BP control and diuresis effect. We will start chlorthalidone 12.5mg daily and decrease amlodipine to 2.5mg daily as well. Of note, patient was previously on lasix for about 1 day, but was stopped due to concerns about intravascular volume status.   - starting chlorthalidone 12.5mg daily and decreasing amlodipine to 2.5mg daily as above    Hypertension  BP at home elevated to 140s/80s. Initial BP in clinic was 160s/80s, improved to 136/81 on recheck. Taking amlodipine 5mg daily at home. Will decrease amlodipine to 2.5mg daily and start chlorthalidone 12.5mg daily as above. Also discussed with patient to measure BP daily and report log of BP in 2 weeks.     cGVHD  Follows closely with BMT. Initially had involvement of eyes, mouth, skin, and liver. Liver and skin issues have mostly resolved. Unsure if his eyes and mouth have improved since starting the Belumosudil, but they have not worsened.     Return to clinic: in 2 months with labs    Discussed with Dr. Chew.     Petar Beebe MD  PGY1, Internal Medicine  [facility]phrology Clinic    Attestation signed by Keegan Chew MD at 11/1/2022  7:19 PM:  I personally examined and evaluated this patient on 11/01/22. I discussed the patient with the resident/fellow and care team, and agree with the assessment and plan of care as documented in the resident's/fellow's note of 11/01/22.   Please also see my separate note for additional information.   Keegan Chew MD on 11/1/2022 at 7:19  PM

## 2022-11-01 NOTE — PATIENT INSTRUCTIONS
Start Chlorthalidone 12.5 mg daily and reduce amlodipine to 2.5 mg daily  Measure BP daily and report in 2 weeks  Labs in 2 week  4.   Will see you again in 2 months

## 2022-11-03 NOTE — PROGRESS NOTES
This is a recent snapshot of the patient's [facility] medical record.  For current drug dose and complete information and questions, call [phone] or In Basket pool, [facility] ([id number])  CSN Number:  [id number]

## 2022-11-07 ENCOUNTER — DOCUMENTATION ONLY (OUTPATIENT)
Dept: OPTOMETRY | Facility: CLINIC | Age: 62
End: 2022-11-07

## 2022-11-07 DIAGNOSIS — C91.01 ACUTE LYMPHOBLASTIC LEUKEMIA (ALL) IN REMISSION (H): Primary | ICD-10-CM

## 2022-11-07 NOTE — PROGRESS NOTES
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    HPI:    Eyes still bothersome but improved, had a right eye lens fitted today, was instructed to use eyedrops every 1-2 hours when awake.  He reports stable vision.  Mouth with no recent ulcers or flares.  Tolerating his prednisone taper well it seems. Continues with dex s/s as well.  Lower extremity edema improved.  Weight gain down nicely to almost his recent baseline weight.  Discussed fluid intake.  Good oral intake.  Had 1 episode of epistaxis that we will monitor closely while on belumosudil adult but no other signs of bleeding clinically.          Review of Systems                                                                                                                                         8 point Review of systems was negative except as detailed above     PHYSICAL EXAM                                                                                                                                                 KPS: 80  BP (!) 156/86   Pulse 90   Temp 98  F (36.7  C)   Resp 18   Wt 114.9 kg (253 lb 3.2 oz)   SpO2 98%   BMI 33.41 kg/m    Blood pressure at home, generally systolic blood pressure less than 145    Wt Readings from Last 4 Encounters:   11/01/22 117.3 kg (258 lb 9.6 oz)   11/01/22 120.2 kg (264 lb 14.4 oz)   10/25/22 117.9 kg (260 lb)   10/18/22 117.9 kg (260 lb)      General: NAD.  HEENT: sclera anicteric, injected conjunctivae.  No lichenoid changes in oral mucosa, no ulcers seen.    Lungs:  CTA b/l  no wheezes  CV: RRR  no gallop  Abd; soft, NABS, protuberant  Ext/ Skin: Skin bruising on bilateral forearms and previous skin tears have now healed, Bites also healed    LE 1-2 bilateral edema in lower extremities; wearing compression socks-improved  Access: port-a-cath    cGVHD therapy started on February 24 2022  - Baseline NIH scoring  2/24/2022 : skin 2 maculopapular rash/erythema with no sclerotic features, mouth score 1 mild symptoms with lichenoid changes less than 25%, eyes score 2 moderate symptoms without new visual impairments due to KCS requiring lubricant eyedrops more than 3 times a day, overall mild scale for her provider  - 3/25/2022 1 month NIH treatment assessment on PQRST: skin 2, mouth score 1 mild symptoms with NO lichenoid changes, eyes score 2 moderate symptoms w requiring lubricant eyedrops more than 3 times a day, liver score 1 ALT 3.7x ULN and nl bilirubin   - 3/31  Mouth clear; eyes minimal LFT still abn; no joint or new GI sx  - 4/28 Mouth clear, Left>R eye is mild sclera erythema, lids are pink and irritated appearing, LFT's slow improvement, no new joint, skin, or GI symptoms.   -5/11 mouth with mild erythema but no lichenoid changes, eyes using eyedrops 4-6 times a day score of 2, LFTs significantly improved from baseline slight elevation in alk phos and AST with normal bilirubin, skin with hyperpigmentation from old acute GVHD no active skin rashes, no GI symptoms no musculoskeletal complaints.  -5/26 Mouth with very slight lichenoid changes, erythema.  Eyes still using eyedrops 4-6x per day.  LFTs essentially normal with slight elevation in alk phos.  Skin with hyperpigmentation from old acute GVHD, no active rashes, no GI or MSK concerns.  Skin 0, mouth 0 but with lichenoid changes, eyes 2  -6/7/22 Mouth with no lichenoid changes, erythema.  Eyes still using eyedrops 4-6x per day.  LFTs essentially normal with slight elevation in alk phos.  Skin with hyperpigmentation from old acute GVHD, no active rashes, no GI or MSK concerns.  Skin 0, mouth 0, eyes 2     -6 month PQRST assessment 9/6/2022: eyes 3, mouth 0 for symptoms, 25% lichenoid changes (1), liver/GI/skin 0    11/8/2022 cGVHD NIG scoring eyes 3, no other organ involvement clinically    ASSESSMENT AND PLAN   Nik Nava is a 61 year old male with Ph Pos  ALL, day 454 s/p sib allo stem cell transplant    Day -6 (8/4): flu/cy  Day -5 through day -2 (8/5-8/8): flu  Day -1 (8/9): TBI  Day 0 (8/10): transplant     1.  Acute lymphoblastic leukemia, [place] chromosome positive in CR, MRD neg. S/p allo sib PBSCT. (ABO matched)  HCT-CI score: 3 (prior solid tumor)  Day +100 bone marrow biopsy is 100% donor with no morphologic or flow cytometric evidence of leukemia BCR abl is pending.  - Day +180 restaging BM bx- still in CR  100% donor;  2/16/22 BCR ABL major breakpoint undetectable.   - 1 year restaging 8/18/2022: Markedly hypocellular bone marrow [less than 10% cellular] with maturing trilineage hematopoiesis and no definite morphologic or immunophenotypic evidence for involvement by B lymphoblastic leukemia. BCRABL1 PCR not detected, bone marrow % donor. FISH NORMAL No evidence of BCR-ABL1 fusion  - Maintenance with TKI posttransplantation given his Ph positive ALL that have not been started previously presumed secondary to multiple active issues specifically infections and COVID.  Per Avila Tobar: will reconsider this patient will think about whether this is something he would like to consider he was previously on Dasatinib, we would start on a low dose and increase as tolerated.  This is on hold now pending active GVHD therapy, we discussed on this visit 10/18 in agreement with holding off.     2.  HEME:  - Pulmonary emboli: He developed a pulmonary emboli in the setting of receiving PEG asparaginase (ie provoked thrombus).  Xarelto complete.   -At 1 year anniversary visit completed screening for secondary iron overload, 8/2022 ferritin 1023, recheck on 10/18 increased to 2348, expect this is acute phase reactant with recent GVHD flare.    - 11/8 ferritin pending iron profile within normal, discussed with the patient tentatively need to start phlebotomies as I worry about his tolerance to iron chelators specifically regarding his renal function, will likely  have to start with small volumes and assess his tolerance.     3.  FEN/Renal:  - Cr improved with switching patient to sirolimus briefly, then with increased Cr on 10/11 with third spacing. Total protein to creatinine ratio 0.41 on 10/11 recheck 0.39 on 10/18. 11/8 creatinine up to 1.4 along with elevated BUN recently started on chlorthalidone, will communicate with nephrology-he will have labs done next week   - He has a history of renal cancer and resection. Has a single kidney.  - 11/1/2022 seen by nephrology: multifactorial, focus on BP control, Started Chlorthalidone 12.5 mg daily and reduce amlodipine to 2.5 mg per day, Low salt diet and reduce ice tea. Next step: may increase chlorthalidone if Cr stable but if not improving in swelling, may start torsemide Follow-up in 2 month   -Hypomagnesemia,11/8 decrease magnesium to 2 tablets 2 times a day     4.    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   #Skin GVHD- history of biopsy proven GVHD of the skin 8/30/2021; resolved.   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 WNL today 11/8  # Ocular GVHD: follows with ophthalmology. Maxitrol to lids, continue refreshing drops QID. Score 3  Seen 10/20 by Dr. Juarez and had ocular fittings (today 11/8     1.) Dry Eye OU  -s/p BMT 08/2021  -Worsening dryness right eye>left eye, symptomatic for photophobia/tearing  -Right eye cornea improved with BCL wear (though not in today), left eye stable without sig stain  -Failed: Restasis/Xiidra (stinging), plugs (scarred puncta)  -Currently on: AT 3-10x/day as needed.   -Doing well on BCL with daily oflox right eye, FML bid OU  -Replaced BCL today. Continue current regimen. Air Optix N&D  -Consider monthly replacement of BCL for now, until eyes have stabilized     2.) Hyperopia/Presbyopia OU  -Previously BCVA 20/20 with correction. Uses low plus readers for distance and higher plus readers for near  -Right  eye acuity back to baseline     # Oral GVHD: dex s/s three-4 times a day; tac ointment to lips as needed.  Clotrimazole cream added after seeing dermatology.  Lichenoid changes have resolved with no other ulcers today 11/8     Previous therapies  Tacrolimus-previously decided to stay on same dose, no checks of levels if clinically stable, and no need switch to sirolimus. (keep tac low as gvhd is quiet and viremia). 5/10 level 45 with starting of COVID therapy and D-D intractions (Paxlovid for COVID19, which has a drug interaction with tacrolimus-Ritonavir increases serum levels of tacrolimus).   Tacrolimus level subtherapeutic, increase to 2.5 mg twice daily recently, 8/23/2022 instructed to change tacrolimus to 2 mg twice daily. 9/6 with mild NEGRA, hyperkalemia, hypomagnesemia, and reports some tremors and cramps, suspect tacrolimus to be high therefore empirically decreased to 1.5 twice daily, skip morning dose of tacrolimus and took 2 mg today after his afternoon labs. Decrease to 1mg BID on Saturday.  Sirolimus  9/21 despite fluids and a dose adjustment of tacrolimus patient seem to have persistence NORBERTO related NEGRA, therefore after discussion with the patient decision was made to transition to sirolimus that was started on 9/21, patient initially seem to tolerate this well with normalization of kidney function  10/11 presented with flares of ocular and oral GVHD, fluid retention and gain of 5-6 kg, lower extremity edema, abdominal distention, total protein to creatinine ratio elevated at 0.4, in addition to elevation in BUN and creatinine, HTN.  Picture most consistent with sirolimus related side effects.  Less likely that the patient will tolerate even a lower level of sirolimus.  Therefore the patient was instructed to stop sirolimus, last dose on 10/10. 10/14 level 3.5 appropriately trending down.     Corticosteroids  3/1-3/16: prednisone 100mg every day  3/17 75mg every day   3/31 75 alt with 50  4/6: 75 alt  with 25mg every other day  4/12 pred tapered to 60 alternating with 25mg   4/15 In setting of viruses trying to reactivate,GVHD stable: Taper 60/15mg alternating.  4/20 decrease pred further to 50/10.    4/25 taper pred to 50/0 every other day as long as symptoms stable.   5/2 Pred decrease 40/0 alternating every other day.   5/13 decrease to 30/0  5/20 decrease to 20/0 then slowly by 5 mg weekly  6/7 decrease to 10/0  Went up to 15/0 with mild increased LFTs  8/23/2022 increased to 20/0, with persistence of oral ulcers and active ocular GVHD.  Discussed with the patient the importance of stopping chewing of nicotine gums for prolonged hours as that would not help with the healing of the oral ulcers.  I discussed with the patient alternatives of nicotine patches that he will reconsider and let me know.  9/6 decreased to 15 alternating with 0  9/13 decreased to 10 alternating with 0  9/21 discontinued tacrolimus given persistent NEGRA and single kidney and start the patient on sirolimus without loading dose.  We will get a list of possible side effects and adverse events from the Pharm.D. see sirolimus section above.  10/11 GVHD flare. Sirolimus stopped. Resume prednisone 40 mg daily as of 10/12, no change with mildly worsening eye pain 10/14  Reduce pred to 35mg 10/25 and 25mg 11/1. 11/8 20 mg plan to go down not more than 5 mg weekly-we will stay on 20 mg till he sees me next appointment on 11/22    Belumosudil  Patient intolerant/with disease flares on tacrolimus and sirolimus see above.  Discussed with the patient the different options for therapy of chronic GVHD that are FDA approved.  Given the side effect profiles after discussing in detail and given the least nephrotoxicity and expected response, taking into account other metabolic effect as well.  We will proceed with prior authorization with belumosudil.  Patient was given material to review for possible expected adverse events and side effects with both  Belumosodil and ruxolitinib.   --- Started on 10/18 in p.m.     5.  ID: Afebrile with no current signs or symptoms of infection    # cat bite/scratch: doxy 100mg BID x 10 days; completes 11/3/22.  Bite has healed, no signs of infection.     #History of COVID   Positive via homed test 5/8. Treated with Paxlovid with resolution of symptoms.  Had recurrence on 5/18. Resolved  Covid 11/21, 12/21, due for booster will recommend locally but he would like it here      #History of pulmonary infiltrates:   4/20 CT chest with new RUL infiltrate. Highly immunocompromised. 4/22 Fungitell/asp GM were neg. BAL 4/29 NGTD, increased micafungin to 150mg IV daily-- f/u 6/1 Dr Garcia CT with mixed results, followed with Dr. Garcia August 2022 with resolution of pulmonary nodules and normalization of LFTs we will switch patient will switch patient to posaconazole prophylactic dose and monitor LFTs and any infectious concerns closely (wnl stable 10/14)     #History of CMV viremia:    - CMV viremia up to 1100. 4/15 started valcyte 900mg PO BID. 4/20 CMV <137, 5/10 ND, decreased to 450mg BID 4/30 - continue 4 weeks as long as CMV <137 till 5/28 + weekly CMV  - Switch to acyclovir after completion of current therapy 5/28. 10/11 CMV ND. Check monthly on IST, pending 11/8     - PPx:   ACV  Posaconazole (previously on micafungin with LFts abl, hx of pulmonary nodules needign treatment dose).   Pentamidine, last 10/25  Azithro 250mg daily (stopped levaquin due to possible tendonitis).      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 S/p 4/22 and 5/4/22 rituximab 375mg/m2 5/10 ND x2, 6/7 ND, 8/18/2022 971, 10/11 ND, check every other month on IST, pending 11/8  S/p covid vaccine series 12/2021  S/p evusheld   Deferred annual vaccines in the setting of GVHD and immunosuppression therapy     6. Endo:   - Hx of graves disease; On synthroid 175 mcg daily. TSH normal 4/6/2022 no reflex to T4.  Recheck on 10/18 T3 low at 0.03, free T3 and  T4 within normal.  Subclinical hyperthyroidism, will discuss with endocrine-message sent and request follow-up in their clinic officially.  - HLD: 11/2022 cholesterol 355, triglycerides 153, -- 11/8 started rosuvastatin 5 mg daily we will recheck lipid profile in 3 months.     7. CV:   - Hypertension on Norvasc: Decreased to 2.5mg, added thiazide as above nephrology section  - TTE most recently 10/2022     8. GI:   -Omeprazole for heartburn  -LFTs as above, now normal. 9/6 decreased ursodiol to daily      9. Psych:   - Situational anxiety - lexapro 10mg daily.   - Insomnia: worse on steroids. Ativan, trazadone, melatonin     10. MSK: left food drop, PT. Occasional muscle cramps discussed optimizing vitamin D levels and considering vitamin D, some of the symptomatology of muscle cramps are likely related to chronic GVHD.     11. HCM/Age appropriate cancer screening:  -Discussed with the patient importance of age-appropriate cancer screening including colonoscopies, he is due for this.  -Discussed importance of at least once a year vitamin D level check, 8/18/2022 wnl  -Screening for secondary iron overload, 8/2022 ferritin 1023 we will recheck in 2-3 months, pending 11/8  -Yearly TSH 2022 wnl; yearly lipid panel - see GI section above  -9/6/2022 DEXA scan Based on BMD diagnosis is consistent with normal bone density based on WHO criteria Ref. 1   -PFTs 9/20/22, see above  -Metabolic other, 8/2022 testosterone within normal        Plan:    Pending Ferritin, CMV/EBV  Completed doxycycline  Decrease magnesium to 2 tablets 2 times a day  Decrease prednisone to 20 mg daily till next appointment with me  He has monthly pentamadine next appointment scheduled  Will reach out to nephrology regarding labs next week and add LFTs to that  Start rosuvastatin 5 mg daily we will check CPK on 11/22 and uptitrate pending repeat lipid profile in 3 months  We discussed patient receiving his influenza annual vaccine as well as  COVID boosters locally  Follow up with Dr. Juarez for eyes  Return to clinic with me on 11/22        I spent 45 minutes in the care of this patient today, which included time necessary for preparation for the visit, obtaining history, ordering medications/tests/procedures as medically indicated, review of pertinent medical literature, counseling of the patient, communication of recommendations to the care team, and documentation time.

## 2022-11-08 ENCOUNTER — ALLIED HEALTH/NURSE VISIT (OUTPATIENT)
Dept: OPHTHALMOLOGY | Facility: CLINIC | Age: 62
End: 2022-11-08
Payer: COMMERCIAL

## 2022-11-08 ENCOUNTER — LAB (OUTPATIENT)
Dept: LAB | Facility: CLINIC | Age: 62
End: 2022-11-08
Attending: INTERNAL MEDICINE
Payer: COMMERCIAL

## 2022-11-08 ENCOUNTER — ONCOLOGY VISIT (OUTPATIENT)
Dept: TRANSPLANT | Facility: CLINIC | Age: 62
End: 2022-11-08
Attending: INTERNAL MEDICINE
Payer: COMMERCIAL

## 2022-11-08 VITALS
SYSTOLIC BLOOD PRESSURE: 156 MMHG | HEART RATE: 90 BPM | RESPIRATION RATE: 18 BRPM | OXYGEN SATURATION: 98 % | BODY MASS INDEX: 33.41 KG/M2 | TEMPERATURE: 98 F | DIASTOLIC BLOOD PRESSURE: 86 MMHG | WEIGHT: 253.2 LBS

## 2022-11-08 DIAGNOSIS — C91.01 ACUTE LYMPHOBLASTIC LEUKEMIA (ALL) IN REMISSION (H): ICD-10-CM

## 2022-11-08 DIAGNOSIS — D89.813 GVHD AS COMPLICATION OF BONE MARROW TRANSPLANT (H): ICD-10-CM

## 2022-11-08 DIAGNOSIS — E78.2 MIXED HYPERLIPIDEMIA: Primary | ICD-10-CM

## 2022-11-08 DIAGNOSIS — H16.123 FILAMENTARY KERATITIS OF BOTH EYES: Primary | ICD-10-CM

## 2022-11-08 DIAGNOSIS — T86.09 GVHD AS COMPLICATION OF BONE MARROW TRANSPLANT (H): ICD-10-CM

## 2022-11-08 LAB
ALBUMIN SERPL BCG-MCNC: 4.1 G/DL (ref 3.5–5.2)
ALP SERPL-CCNC: 122 U/L (ref 40–129)
ALT SERPL W P-5'-P-CCNC: 41 U/L (ref 10–50)
ANION GAP SERPL CALCULATED.3IONS-SCNC: 12 MMOL/L (ref 7–15)
AST SERPL W P-5'-P-CCNC: 41 U/L (ref 10–50)
BASOPHILS # BLD AUTO: 0 10E3/UL (ref 0–0.2)
BASOPHILS NFR BLD AUTO: 0 %
BILIRUB SERPL-MCNC: 0.8 MG/DL
BUN SERPL-MCNC: 52 MG/DL (ref 8–23)
CALCIUM SERPL-MCNC: 10.1 MG/DL (ref 8.8–10.2)
CHLORIDE SERPL-SCNC: 98 MMOL/L (ref 98–107)
CREAT SERPL-MCNC: 1.42 MG/DL (ref 0.67–1.17)
DEPRECATED HCO3 PLAS-SCNC: 25 MMOL/L (ref 22–29)
EOSINOPHIL # BLD AUTO: 0 10E3/UL (ref 0–0.7)
EOSINOPHIL NFR BLD AUTO: 0 %
ERYTHROCYTE [DISTWIDTH] IN BLOOD BY AUTOMATED COUNT: 16.5 % (ref 10–15)
GFR SERPL CREATININE-BSD FRML MDRD: 55 ML/MIN/1.73M2
GLUCOSE SERPL-MCNC: 120 MG/DL (ref 70–99)
HCT VFR BLD AUTO: 34.4 % (ref 40–53)
HGB BLD-MCNC: 11.9 G/DL (ref 13.3–17.7)
IMM GRANULOCYTES # BLD: 0.2 10E3/UL
IMM GRANULOCYTES NFR BLD: 2 %
IRON BINDING CAPACITY (ROCHE): 351 UG/DL (ref 240–430)
IRON SATN MFR SERPL: 32 % (ref 15–46)
IRON SERPL-MCNC: 114 UG/DL (ref 61–157)
LYMPHOCYTES # BLD AUTO: 2.3 10E3/UL (ref 0.8–5.3)
LYMPHOCYTES NFR BLD AUTO: 21 %
MAGNESIUM SERPL-MCNC: 2.4 MG/DL (ref 1.7–2.3)
MCH RBC QN AUTO: 34.5 PG (ref 26.5–33)
MCHC RBC AUTO-ENTMCNC: 34.6 G/DL (ref 31.5–36.5)
MCV RBC AUTO: 100 FL (ref 78–100)
MONOCYTES # BLD AUTO: 0.8 10E3/UL (ref 0–1.3)
MONOCYTES NFR BLD AUTO: 7 %
NEUTROPHILS # BLD AUTO: 7.9 10E3/UL (ref 1.6–8.3)
NEUTROPHILS NFR BLD AUTO: 70 %
NRBC # BLD AUTO: 0.1 10E3/UL
NRBC BLD AUTO-RTO: 1 /100
PHOSPHATE SERPL-MCNC: 3.8 MG/DL (ref 2.5–4.5)
PLATELET # BLD AUTO: 150 10E3/UL (ref 150–450)
POTASSIUM SERPL-SCNC: 5.2 MMOL/L (ref 3.4–5.3)
PROT SERPL-MCNC: 6.6 G/DL (ref 6.4–8.3)
RBC # BLD AUTO: 3.45 10E6/UL (ref 4.4–5.9)
SODIUM SERPL-SCNC: 135 MMOL/L (ref 136–145)
WBC # BLD AUTO: 11.2 10E3/UL (ref 4–11)

## 2022-11-08 PROCEDURE — 80053 COMPREHEN METABOLIC PANEL: CPT

## 2022-11-08 PROCEDURE — 83550 IRON BINDING TEST: CPT

## 2022-11-08 PROCEDURE — 36591 DRAW BLOOD OFF VENOUS DEVICE: CPT

## 2022-11-08 PROCEDURE — 250N000011 HC RX IP 250 OP 636: Performed by: INTERNAL MEDICINE

## 2022-11-08 PROCEDURE — 99215 OFFICE O/P EST HI 40 MIN: CPT | Performed by: INTERNAL MEDICINE

## 2022-11-08 PROCEDURE — 99207 PR NO CHARGE LOS: CPT

## 2022-11-08 PROCEDURE — 84100 ASSAY OF PHOSPHORUS: CPT

## 2022-11-08 PROCEDURE — 85025 COMPLETE CBC W/AUTO DIFF WBC: CPT

## 2022-11-08 PROCEDURE — 87799 DETECT AGENT NOS DNA QUANT: CPT

## 2022-11-08 PROCEDURE — 83735 ASSAY OF MAGNESIUM: CPT

## 2022-11-08 PROCEDURE — G0463 HOSPITAL OUTPT CLINIC VISIT: HCPCS

## 2022-11-08 PROCEDURE — 82728 ASSAY OF FERRITIN: CPT

## 2022-11-08 RX ORDER — ROSUVASTATIN CALCIUM 5 MG/1
5 TABLET, COATED ORAL DAILY
Qty: 30 TABLET | Refills: 3 | Status: SHIPPED | OUTPATIENT
Start: 2022-11-08 | End: 2023-03-17

## 2022-11-08 RX ORDER — HEPARIN SODIUM (PORCINE) LOCK FLUSH IV SOLN 100 UNIT/ML 100 UNIT/ML
5 SOLUTION INTRAVENOUS ONCE
Status: COMPLETED | OUTPATIENT
Start: 2022-11-08 | End: 2022-11-08

## 2022-11-08 RX ADMIN — SODIUM CHLORIDE, PRESERVATIVE FREE 5 ML: 5 INJECTION INTRAVENOUS at 14:50

## 2022-11-08 ASSESSMENT — PAIN SCALES - GENERAL: PAINLEVEL: MILD PAIN (2)

## 2022-11-08 NOTE — LETTER
11/8/2022         RE: Nik Nava  [id number] [facility] [id number]        Dear Colleague,    Thank you for referring your patient, Nik Nava, to the [facility] BLOOD AND MARROW TRANSPLANT PROGRAM [place]. Please see a copy of my visit note below.        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    HPI:    Eyes still bothersome but improved, had a right eye lens fitted today, was instructed to use eyedrops every 1-2 hours when awake.  He reports stable vision.  Mouth with no recent ulcers or flares.  Tolerating his prednisone taper well it seems. Continues with dex s/s as well.  Lower extremity edema improved.  Weight gain down nicely to almost his recent baseline weight.  Discussed fluid intake.  Good oral intake.  Had 1 episode of epistaxis that we will monitor closely while on belumosudil adult but no other signs of bleeding clinically.          Review of Systems                                                                                                                                         8 point Review of systems was negative except as detailed above     PHYSICAL EXAM                                                                                                                                                 KPS: 80  BP (!) 156/86   Pulse 90   Temp 98  F (36.7  C)   Resp 18   Wt 114.9 kg (253 lb 3.2 oz)   SpO2 98%   BMI 33.41 kg/m    Blood pressure at home, generally systolic blood pressure less than 145    Wt Readings from Last 4 Encounters:   11/01/22 117.3 kg (258 lb 9.6 oz)   11/01/22 120.2 kg (264 lb 14.4 oz)   10/25/22 117.9 kg (260 lb)   10/18/22 117.9 kg (260 lb)      General: NAD.  HEENT: sclera anicteric, injected conjunctivae.  No lichenoid changes in oral mucosa, no ulcers seen.    Lungs:  CTA b/l  no wheezes  CV: RRR  no gallop  Abd; soft, NABS,  protuberant  Ext/ Skin: Skin bruising on bilateral forearms and previous skin tears have now healed, Bites also healed    LE 1-2 bilateral edema in lower extremities; wearing compression socks-improved  Access: port-a-cath    cGVHD therapy started on February 24 2022  - Baseline NIH scoring 2/24/2022 : skin 2 maculopapular rash/erythema with no sclerotic features, mouth score 1 mild symptoms with lichenoid changes less than 25%, eyes score 2 moderate symptoms without new visual impairments due to KCS requiring lubricant eyedrops more than 3 times a day, overall mild scale for her provider  - 3/25/2022 1 month NIH treatment assessment on PQRST: skin 2, mouth score 1 mild symptoms with NO lichenoid changes, eyes score 2 moderate symptoms w requiring lubricant eyedrops more than 3 times a day, liver score 1 ALT 3.7x ULN and nl bilirubin   - 3/31  Mouth clear; eyes minimal LFT still abn; no joint or new GI sx  - 4/28 Mouth clear, Left>R eye is mild sclera erythema, lids are pink and irritated appearing, LFT's slow improvement, no new joint, skin, or GI symptoms.   -5/11 mouth with mild erythema but no lichenoid changes, eyes using eyedrops 4-6 times a day score of 2, LFTs significantly improved from baseline slight elevation in alk phos and AST with normal bilirubin, skin with hyperpigmentation from old acute GVHD no active skin rashes, no GI symptoms no musculoskeletal complaints.  -5/26 Mouth with very slight lichenoid changes, erythema.  Eyes still using eyedrops 4-6x per day.  LFTs essentially normal with slight elevation in alk phos.  Skin with hyperpigmentation from old acute GVHD, no active rashes, no GI or MSK concerns.  Skin 0, mouth 0 but with lichenoid changes, eyes 2  -6/7/22 Mouth with no lichenoid changes, erythema.  Eyes still using eyedrops 4-6x per day.  LFTs essentially normal with slight elevation in alk phos.  Skin with hyperpigmentation from old acute GVHD, no active rashes, no GI or MSK concerns.   Skin 0, mouth 0, eyes 2     -6 month PQRST assessment 9/6/2022: eyes 3, mouth 0 for symptoms, 25% lichenoid changes (1), liver/GI/skin 0    11/8/2022 cGVHD NIG scoring eyes 3, no other organ involvement clinically    ASSESSMENT AND PLAN   Nik Nava is a 61 year old male with Ph Pos ALL, day 454 s/p sib allo stem cell transplant    Day -6 (8/4): flu/cy  Day -5 through day -2 (8/5-8/8): flu  Day -1 (8/9): TBI  Day 0 (8/10): transplant     1.  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 S/p allo sib PBSCT. (ABO matched)  HCT-CI score: 3 (prior solid tumor)  Day +100 bone marrow biopsy is 100% donor with no morphologic or flow cytometric evidence of leukemia BCR abl is pending.  - Day +180 restaging BM bx- still in CR  100% donor;  2/16/22 BCR ABL major breakpoint undetectable.   - 1 year restaging 8/18/2022: Markedly hypocellular bone marrow [less than 10% cellular] with maturing trilineage hematopoiesis and no definite morphologic or immunophenotypic evidence for involvement by B lymphoblastic leukemia. BCRABL1 PCR not detected, bone marrow % donor. FISH NORMAL No evidence of BCR-ABL1 fusion  - Maintenance with TKI posttransplantation given his Ph positive ALL that have not been started previously presumed secondary to multiple active issues specifically infections and COVID.  Per Avila Tobar: will reconsider this patient will think about whether this is something he would like to consider he was previously on Dasatinib, we would start on a low dose and increase as tolerated.  This is on hold now pending active GVHD therapy, we discussed on this visit 10/18 in agreement with holding off.     2.  HEME:  - Pulmonary emboli: He developed a pulmonary emboli in the setting of receiving PEG asparaginase (ie provoked thrombus).  Xarelto complete.   -At 1 year anniversary visit completed screening for secondary iron overload, 8/2022 ferritin 1023, recheck on 10/18 increased to  2348, expect this is acute phase reactant with recent GVHD flare.    - 11/8 ferritin pending iron profile within normal, discussed with the patient tentatively need to start phlebotomies as I worry about his tolerance to iron chelators specifically regarding his renal function, will likely have to start with small volumes and assess his tolerance.     3.  FEN/Renal:  - Cr improved with switching patient to sirolimus briefly, then with increased Cr on 10/11 with third spacing. Total protein to creatinine ratio 0.41 on 10/11 recheck 0.39 on 10/18. 11/8 creatinine up to 1.4 along with elevated BUN recently started on chlorthalidone, will communicate with nephrology-he will have labs done next week   - He has a history of renal cancer and resection. Has a single kidney.  - 11/1/2022 seen by nephrology: multifactorial, focus on BP control, Started Chlorthalidone 12.5 mg daily and reduce amlodipine to 2.5 mg per day, Low salt diet and reduce ice tea. Next step: may increase chlorthalidone if Cr stable but if not improving in swelling, may start torsemide Follow-up in 2 month   -Hypomagnesemia,11/8 decrease magnesium to 2 tablets 2 times a day     4.    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   #Skin GVHD- history of biopsy proven GVHD of the skin 8/30/2021; resolved.   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 WNL today 11/8  # Ocular GVHD: follows with ophthalmology. Maxitrol to lids, continue refreshing drops QID. Score 3  Seen 10/20 by Dr. Juarez and had ocular fittings (today 11/8     1.) Dry Eye OU  -s/p BMT 08/2021  -Worsening dryness right eye>left eye, symptomatic for photophobia/tearing  -Right eye cornea improved with BCL wear (though not in today), left eye stable without sig stain  -Failed: Restasis/Xiidra (stinging), plugs (scarred puncta)  -Currently on: AT 3-10x/day as needed.   -Doing well on BCL with daily oflox right eye, FML  bid OU  -Replaced BCL today. Continue current regimen. Air Optix N&D  -Consider monthly replacement of BCL for now, until eyes have stabilized     2.) Hyperopia/Presbyopia OU  -Previously BCVA 20/20 with correction. Uses low plus readers for distance and higher plus readers for near  -Right eye acuity back to baseline     # Oral GVHD: dex s/s three-4 times a day; tac ointment to lips as needed.  Clotrimazole cream added after seeing dermatology.  Lichenoid changes have resolved with no other ulcers today 11/8     Previous therapies  Tacrolimus-previously decided to stay on same dose, no checks of levels if clinically stable, and no need switch to sirolimus. (keep tac low as gvhd is quiet and viremia). 5/10 level 45 with starting of COVID therapy and D-D intractions (Paxlovid for COVID19, which has a drug interaction with tacrolimus-Ritonavir increases serum levels of tacrolimus).   Tacrolimus level subtherapeutic, increase to 2.5 mg twice daily recently, 8/23/2022 instructed to change tacrolimus to 2 mg twice daily. 9/6 with mild NEGRA, hyperkalemia, hypomagnesemia, and reports some tremors and cramps, suspect tacrolimus to be high therefore empirically decreased to 1.5 twice daily, skip morning dose of tacrolimus and took 2 mg today after his afternoon labs. Decrease to 1mg BID on Saturday.  Sirolimus  9/21 despite fluids and a dose adjustment of tacrolimus patient seem to have persistence NORBERTO related NEGRA, therefore after discussion with the patient decision was made to transition to sirolimus that was started on 9/21, patient initially seem to tolerate this well with normalization of kidney function  10/11 presented with flares of ocular and oral GVHD, fluid retention and gain of 5-6 kg, lower extremity edema, abdominal distention, total protein to creatinine ratio elevated at 0.4, in addition to elevation in BUN and creatinine, HTN.  Picture most consistent with sirolimus related side effects.  Less likely that the  patient will tolerate even a lower level of sirolimus.  Therefore the patient was instructed to stop sirolimus, last dose on 10/10. 10/14 level 3.5 appropriately trending down.     Corticosteroids  3/1-3/16: prednisone 100mg every day  3/17 75mg every day   3/31 75 alt with 50  4/6: 75 alt with 25mg every other day  4/12 pred tapered to 60 alternating with 25mg   4/15 In setting of viruses trying to reactivate,GVHD stable: Taper 60/15mg alternating.  4/20 decrease pred further to 50/10.    4/25 taper pred to 50/0 every other day as long as symptoms stable.   5/2 Pred decrease 40/0 alternating every other day.   5/13 decrease to 30/0  5/20 decrease to 20/0 then slowly by 5 mg weekly  6/7 decrease to 10/0  Went up to 15/0 with mild increased LFTs  8/23/2022 increased to 20/0, with persistence of oral ulcers and active ocular GVHD.  Discussed with the patient the importance of stopping chewing of nicotine gums for prolonged hours as that would not help with the healing of the oral ulcers.  I discussed with the patient alternatives of nicotine patches that he will reconsider and let me know.  9/6 decreased to 15 alternating with 0  9/13 decreased to 10 alternating with 0  9/21 discontinued tacrolimus given persistent NEGRA and single kidney and start the patient on sirolimus without loading dose.  We will get a list of possible side effects and adverse events from the Pharm.D. see sirolimus section above.  10/11 GVHD flare. Sirolimus stopped. Resume prednisone 40 mg daily as of 10/12, no change with mildly worsening eye pain 10/14  Reduce pred to 35mg 10/25 and 25mg 11/1. 11/8 20 mg plan to go down not more than 5 mg weekly-we will stay on 20 mg till he sees me next appointment on 11/22    Belumosudil  Patient intolerant/with disease flares on tacrolimus and sirolimus see above.  Discussed with the patient the different options for therapy of chronic GVHD that are FDA approved.  Given the side effect profiles after  discussing in detail and given the least nephrotoxicity and expected response, taking into account other metabolic effect as well.  We will proceed with prior authorization with belumosudil.  Patient was given material to review for possible expected adverse events and side effects with both Belumosodil and ruxolitinib.   --- Started on 10/18 in p.m.     5.  ID: Afebrile with no current signs or symptoms of infection    # cat bite/scratch: doxy 100mg BID x 10 days; completes 11/3/22.  Bite has healed, no signs of infection.     #History of COVID   Positive via homed test 5/8. Treated with Paxlovid with resolution of symptoms.  Had recurrence on 5/18. Resolved  Covid 11/21, 12/21, due for booster will recommend locally but he would like it here      #History of pulmonary infiltrates:   4/20 CT chest with new RUL infiltrate. Highly immunocompromised. 4/22 Fungitell/asp GM were neg. BAL 4/29 NGTD, increased micafungin to 150mg IV daily-- f/u 6/1 Dr Garcia CT with mixed results, followed with Dr. Garcia August 2022 with resolution of pulmonary nodules and normalization of LFTs we will switch patient will switch patient to posaconazole prophylactic dose and monitor LFTs and any infectious concerns closely (wnl stable 10/14)     #History of CMV viremia:    - CMV viremia up to 1100. 4/15 started valcyte 900mg PO BID. 4/20 CMV <137, 5/10 ND, decreased to 450mg BID 4/30 - continue 4 weeks as long as CMV <137 till 5/28 + weekly CMV  - Switch to acyclovir after completion of current therapy 5/28. 10/11 CMV ND. Check monthly on IST, pending 11/8     - PPx:   ACV  Posaconazole (previously on micafungin with LFts abl, hx of pulmonary nodules needign treatment dose).   Pentamidine, last 10/25  Azithro 250mg daily (stopped levaquin due to possible tendonitis).      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 S/p 4/22 and 5/4/22 rituximab 375mg/m2 5/10 ND x2, 6/7 ND, 8/18/2022 971, 10/11 ND, check every other month on  IST, pending 11/8  S/p covid vaccine series 12/2021  S/p anuradha   Deferred annual vaccines in the setting of GVHD and immunosuppression therapy     6. Endo:   - Hx of graves disease; On synthroid 175 mcg daily. TSH normal 4/6/2022 no reflex to T4.  Recheck on 10/18 T3 low at 0.03, free T3 and T4 within normal.  Subclinical hyperthyroidism, will discuss with endocrine-message sent and request follow-up in their clinic officially.  - HLD: 11/2022 cholesterol 355, triglycerides 153, -- 11/8 started rosuvastatin 5 mg daily we will recheck lipid profile in 3 months.     7. CV:   - Hypertension on Norvasc: Decreased to 2.5mg, added thiazide as above nephrology section  - TTE most recently 10/2022     8. GI:   -Omeprazole for heartburn  -LFTs as above, now normal. 9/6 decreased ursodiol to daily      9. Psych:   - Situational anxiety - lexapro 10mg daily.   - Insomnia: worse on steroids. Ativan, trazadone, melatonin     10. MSK: left food drop, PT. Occasional muscle cramps discussed optimizing vitamin D levels and considering vitamin D, some of the symptomatology of muscle cramps are likely related to chronic GVHD.     11. HCM/Age appropriate cancer screening:  -Discussed with the patient importance of age-appropriate cancer screening including colonoscopies, he is due for this.  -Discussed importance of at least once a year vitamin D level check, 8/18/2022 wnl  -Screening for secondary iron overload, 8/2022 ferritin 1023 we will recheck in 2-3 months, pending 11/8  -Yearly TSH 2022 wnl; yearly lipid panel - see GI section above  -9/6/2022 DEXA scan Based on BMD diagnosis is consistent with normal bone density based on WHO criteria Ref. 1   -PFTs 9/20/22, see above  -Metabolic other, 8/2022 testosterone within normal        Plan:    Pending Ferritin, CMV/EBV  Completed doxycycline  Decrease magnesium to 2 tablets 2 times a day  Decrease prednisone to 20 mg daily till next appointment with me  He has monthly  pentamadine next appointment scheduled  Will reach out to nephrology regarding labs next week and add LFTs to that  Start rosuvastatin 5 mg daily we will check CPK on 11/22 and uptitrate pending repeat lipid profile in 3 months  We discussed patient receiving his influenza annual vaccine as well as COVID boosters locally  Follow up with Dr. Juarez for eyes  Return to clinic with me on 11/22        I spent 45 minutes in the care of this patient today, which included time necessary for preparation for the visit, obtaining history, ordering medications/tests/procedures as medically indicated, review of pertinent medical literature, counseling of the patient, communication of recommendations to the care team, and documentation time.      Sincerely,        Akshat Tobar MD

## 2022-11-08 NOTE — NURSING NOTE
"Chief Complaints and History of Present Illnesses   Patient presents with     Contact Lens Problem Right Eye     Chief Complaint(s) and History of Present Illness(es)     Contact Lens Problem Right Eye            Pain scale: 0/10          Comments    Pt here today at the request of Dr. Juarez, patient BCL \"acuvue Night and Day\" fell out and needed a different CTL, placed Acuvue Oasys 8.8 14.0.  pt was informed to use AT frequently to keep lens moist, informed NO TAP water near the eye.     BSS and KMS assisted patient.     Glendy Whitt COT November 8, 2022 2:26 PM                     "

## 2022-11-08 NOTE — NURSING NOTE
"Oncology Rooming Note    November 8, 2022 3:23 PM   Nik Nava is a 62 year old male who presents for:    Chief Complaint   Patient presents with     Blood Draw     Labs obtained via noncoring powerport 20 gauge, 3/4 inch needle, heparin flushed, VS taken, checked into next appt     Oncology Clinic Visit     ALL in remission     Initial Vitals: BP (!) 156/86   Pulse 90   Temp 98  F (36.7  C)   Resp 18   Wt 114.9 kg (253 lb 3.2 oz)   SpO2 98%   BMI 33.41 kg/m   Estimated body mass index is 33.41 kg/m  as calculated from the following:    Height as of 11/1/22: 1.854 m (6' 0.99\").    Weight as of this encounter: 114.9 kg (253 lb 3.2 oz). Body surface area is 2.43 meters squared.  Mild Pain (2) Comment: Data Unavailable   No LMP for male patient.  Allergies reviewed: Yes  Medications reviewed: Yes    Medications: Medication refills not needed today.  Pharmacy name entered into [facility]:    [facility] - [place], [place] - [address] [facility] [place], [place] - [phone]  [facility]    Clinical concerns: none       Brandy Soares            "

## 2022-11-09 DIAGNOSIS — C91.01 ACUTE LYMPHOBLASTIC LEUKEMIA (ALL) IN REMISSION (H): ICD-10-CM

## 2022-11-09 DIAGNOSIS — Z94.81 STATUS POST BONE MARROW TRANSPLANT (H): Primary | ICD-10-CM

## 2022-11-09 LAB
CMV DNA SPEC NAA+PROBE-ACNC: <137 IU/ML
CMV DNA SPEC NAA+PROBE-LOG#: <2.1 {LOG_COPIES}/ML
FERRITIN SERPL-MCNC: 2813 NG/ML (ref 31–409)

## 2022-11-10 DIAGNOSIS — Z94.81 STATUS POST BONE MARROW TRANSPLANT (H): ICD-10-CM

## 2022-11-10 DIAGNOSIS — T86.09 GVHD AS COMPLICATION OF BONE MARROW TRANSPLANT (H): Primary | ICD-10-CM

## 2022-11-10 DIAGNOSIS — D89.813 GVHD AS COMPLICATION OF BONE MARROW TRANSPLANT (H): Primary | ICD-10-CM

## 2022-11-10 LAB — EBV DNA # SPEC NAA+PROBE: NOT DETECTED COPIES/ML

## 2022-11-10 NOTE — TELEPHONE ENCOUNTER
DX, Referring NOTES: Status post BMT, acute lymphoblastic leukemia in remission    For Cancer Patients: Need the original operative and surgical pathology reports and all imaging reports/images related to the disease (includes all thyroid US, nuclear thyroid and total body scans, PET scans, chest CT reports since prior to the diagnosis ).   APPT DATE: 3/1/2023   NOTES (FOR ALL VISITS) STATUS DETAILS   OFFICE NOTES from referring provider N/A    OFFICE NOTES from other specialist Care Everywhere / Internal [facility]:  11/8/22 - BMT OV with Dr. Avila Tobar  11/1/22 - NEPH OV with Dr. Chew  8/18/22 - BMT OV with JENA Moon    [facility]:  10/13/22 - PCC OV with Dr. Hinojosa   ED NOTES Care Everywhere / Internal [facility]:  8/3/21 - Admission with Dr. Vasquez    [facility]:  11/23/22 - ED OV with Dr. Lara  6/1/21 - Admission with Dr. Mejia  5/8/21 - Admission with Dr. Manzano  3/20/21 - ED OV with Dr. Gandhi  * Additional in Care Everywhere   OPERATIVE REPORT  (thyroid, pituitary, adrenal, parathyroid)  (All op notes related to diagnoses) Care Everywhere / Internal [facility]:  8/18/22, 2/7/22 - Bone Marrow Biopsy  * Additional in Epic    [facility]:  6/28/21, 5/17/21, 4/5/21 - Bone Marrow Biopsy  * Additional in CE   MEDICATION LIST Internal    IMAGING      DEXASCAN Internal [facility]:  9/6/22 - DEXA   XR (Chest) Received / Internal [facility]:  11/29/22 - XR Chest  7/8/21 - XR Chest    [facility]:  11/23/22 - XR Chest  5/8/21 - XR Chest  6/29/20 - XR Chest  3/13/18 - XR Chest  12/18/17 - XR Chest   CT (HEAD/NECK/CHEST/ABDOMEN) Received / Internal [facility]:  12/1/22 - CT Chest  8/16/22 - CT Chest  7/7/22 - CT Chest  6/1/22 - CT Chest  4/20/22 - CT Chest/Abd/Pelvis  9/12/21 - CT Facial Bones  7/12/21 - CT Chest    [facility]:  5/8/21 - CT Head/Brain  4/4/21 - CT Chest  3/20/21 - CT Abd/Pelvis   LABS     DIABETES: HBGA1C, CREATININE, FASTING LIPIDS, MICROALBUMIN URINE, POTASSIUM, TSH, T4    THYROID: TSH, T4, CBC, THYRODLONULIN, TOTAL T3,  FREE T4, CALCITONIN, CEA Care Everywhere / Internal [facility]:  1/12/23 - CBC  1/12/23 - CMP  10/31/22 - Lipid  1/10/23 - Routine UA  1/10/23 - TSH, T4, T3  12/3/22 - BMP  12/3/22 - Glucose  10/18/22 - Parathyroid Hormone  8/6/21 - Potassium    [facility]:  5/8/21 - HBGA1C  3/13/18 - Creatinine   PATHOLOGY REPORTS WITH CASE NUMBER  *Surgical path reports for endocrine organs (ovaries, testes, pancreas, etc) Internal   [facility]:  8/18/22 - BM Biopsy (Case: [id number]) (Flow: [id number])     Records Requested  11/10/22    Facility  [facility]  Fax: [phone]   Outcome * 11/10/22 8:49 AM Faxed req to [facility] for images to be pushed to [place] [facility]. - Tamie    * 11/16/22 1:30 PM Images received from [facility] and attached to the patient in PACs. - Tamie    * 2/1/23 8:24 AM Faxed urg req to [facility] for images to be pushed to [facility]. - Tamie    * 2/1/23 2:01 PM Images received from [facility] and attached to the patient in PACs. - Tamie

## 2022-11-11 ENCOUNTER — TELEPHONE (OUTPATIENT)
Dept: ENDOCRINOLOGY | Facility: CLINIC | Age: 62
End: 2022-11-11

## 2022-11-11 DIAGNOSIS — C91.01 ACUTE LYMPHOBLASTIC LEUKEMIA (ALL) IN REMISSION (H): ICD-10-CM

## 2022-11-11 DIAGNOSIS — E03.9 HYPOTHYROIDISM: Primary | ICD-10-CM

## 2022-11-11 DIAGNOSIS — Z94.81 STATUS POST BONE MARROW TRANSPLANT (H): ICD-10-CM

## 2022-11-11 RX ORDER — LEVOTHYROXINE SODIUM 150 UG/1
150 TABLET ORAL DAILY
Qty: 30 TABLET | Refills: 1 | Status: SHIPPED | OUTPATIENT
Start: 2022-11-11 | End: 2022-12-27

## 2022-11-11 NOTE — TELEPHONE ENCOUNTER
----- Message from Akshat Tobar MD sent at 11/11/2022  8:49 AM CST -----  Thank you very much, appreciated.    Akshat  ----- Message -----  From: Ntaalia Gray MD  Sent: 11/10/2022   9:42 PM CST  To: Natalia Gray MD, Akshat Tobar MD    E-consult is probably the quickest way to get answers to questions like this.    I recommend to reduce the levothyroxine to 150 mcg/day and repeat labs in 2-3 months.    Natalia Gray    ----- Message -----  From: Akshat Bearden MD  Sent: 11/7/2022  12:09 PM CST  To: MD Dr. Marina Smyth,    We will formally refer this patient to your care, 1 year post alloHCT for ALL, in the meantime I was hoping to get your input if possible:    - Hx of graves disease; On synthroid 175 mcg daily. TSH normal 4/6/2022 no reflex to T4.  Recheck on 10/18 T3 low at 0.03, free T3 and T4 within normal.     Those were checked int he setting of HTN and LE edema that were likely drug related and now beiong addressed.      Would you recommend any change to current treatment dose or recheck and monitor for now?    Happy to talk as well [phone].    Thank you  Akshat Tobar (BMT staff)

## 2022-11-14 ENCOUNTER — TELEPHONE (OUTPATIENT)
Dept: ENDOCRINOLOGY | Facility: CLINIC | Age: 62
End: 2022-11-14

## 2022-11-15 ENCOUNTER — LAB (OUTPATIENT)
Dept: ONCOLOGY | Facility: CLINIC | Age: 62
End: 2022-11-15
Payer: COMMERCIAL

## 2022-11-15 DIAGNOSIS — I10 HYPERTENSION, ESSENTIAL: ICD-10-CM

## 2022-11-15 DIAGNOSIS — E03.9 HYPOTHYROIDISM: ICD-10-CM

## 2022-11-15 DIAGNOSIS — C91.01 ACUTE LYMPHOBLASTIC LEUKEMIA (ALL) IN REMISSION (H): ICD-10-CM

## 2022-11-15 DIAGNOSIS — T86.09 GVHD AS COMPLICATION OF BONE MARROW TRANSPLANT (H): ICD-10-CM

## 2022-11-15 DIAGNOSIS — D89.813 GVHD AS COMPLICATION OF BONE MARROW TRANSPLANT (H): ICD-10-CM

## 2022-11-15 DIAGNOSIS — Z94.81 STATUS POST BONE MARROW TRANSPLANT (H): ICD-10-CM

## 2022-11-15 LAB
ALBUMIN SERPL-MCNC: 3.6 G/DL (ref 3.4–5)
ALP SERPL-CCNC: 147 U/L (ref 40–150)
ALT SERPL W P-5'-P-CCNC: 52 U/L (ref 0–70)
ANION GAP SERPL CALCULATED.3IONS-SCNC: 3 MMOL/L (ref 3–14)
AST SERPL W P-5'-P-CCNC: 43 U/L (ref 0–45)
BASOPHILS # BLD AUTO: 0 10E3/UL (ref 0–0.2)
BASOPHILS NFR BLD AUTO: 0 %
BILIRUB DIRECT SERPL-MCNC: 0.3 MG/DL (ref 0–0.2)
BILIRUB SERPL-MCNC: 1.1 MG/DL (ref 0.2–1.3)
BUN SERPL-MCNC: 51 MG/DL (ref 7–30)
CALCIUM SERPL-MCNC: 9.8 MG/DL (ref 8.5–10.1)
CHLORIDE BLD-SCNC: 101 MMOL/L (ref 94–109)
CO2 SERPL-SCNC: 30 MMOL/L (ref 20–32)
CREAT SERPL-MCNC: 1.26 MG/DL (ref 0.66–1.25)
EOSINOPHIL # BLD AUTO: 0 10E3/UL (ref 0–0.7)
EOSINOPHIL NFR BLD AUTO: 0 %
ERYTHROCYTE [DISTWIDTH] IN BLOOD BY AUTOMATED COUNT: 16.1 % (ref 10–15)
GFR SERPL CREATININE-BSD FRML MDRD: 64 ML/MIN/1.73M2
GLUCOSE BLD-MCNC: 113 MG/DL (ref 70–99)
HCT VFR BLD AUTO: 34.5 % (ref 40–53)
HGB BLD-MCNC: 11.9 G/DL (ref 13.3–17.7)
IMM GRANULOCYTES # BLD: 0.2 10E3/UL
IMM GRANULOCYTES NFR BLD: 2 %
LYMPHOCYTES # BLD AUTO: 2 10E3/UL (ref 0.8–5.3)
LYMPHOCYTES NFR BLD AUTO: 19 %
MCH RBC QN AUTO: 34.5 PG (ref 26.5–33)
MCHC RBC AUTO-ENTMCNC: 34.5 G/DL (ref 31.5–36.5)
MCV RBC AUTO: 100 FL (ref 78–100)
MONOCYTES # BLD AUTO: 0.7 10E3/UL (ref 0–1.3)
MONOCYTES NFR BLD AUTO: 7 %
NEUTROPHILS # BLD AUTO: 7.8 10E3/UL (ref 1.6–8.3)
NEUTROPHILS NFR BLD AUTO: 72 %
NRBC # BLD AUTO: 0.1 10E3/UL
NRBC BLD AUTO-RTO: 1 /100
PLATELET # BLD AUTO: 143 10E3/UL (ref 150–450)
POTASSIUM BLD-SCNC: 5 MMOL/L (ref 3.4–5.3)
PROT SERPL-MCNC: 6.7 G/DL (ref 6.8–8.8)
RBC # BLD AUTO: 3.45 10E6/UL (ref 4.4–5.9)
SODIUM SERPL-SCNC: 134 MMOL/L (ref 133–144)
T4 FREE SERPL-MCNC: 1.37 NG/DL (ref 0.76–1.46)
WBC # BLD AUTO: 10.7 10E3/UL (ref 4–11)

## 2022-11-15 PROCEDURE — 80053 COMPREHEN METABOLIC PANEL: CPT | Performed by: INTERNAL MEDICINE

## 2022-11-15 PROCEDURE — 36591 DRAW BLOOD OFF VENOUS DEVICE: CPT

## 2022-11-15 PROCEDURE — 84439 ASSAY OF FREE THYROXINE: CPT | Performed by: INTERNAL MEDICINE

## 2022-11-15 PROCEDURE — 85025 COMPLETE CBC W/AUTO DIFF WBC: CPT | Performed by: INTERNAL MEDICINE

## 2022-11-15 PROCEDURE — 82248 BILIRUBIN DIRECT: CPT | Performed by: INTERNAL MEDICINE

## 2022-11-15 RX ORDER — HEPARIN SODIUM (PORCINE) LOCK FLUSH IV SOLN 100 UNIT/ML 100 UNIT/ML
5 SOLUTION INTRAVENOUS
Status: DISCONTINUED | OUTPATIENT
Start: 2022-11-15 | End: 2022-11-15 | Stop reason: HOSPADM

## 2022-11-15 RX ADMIN — HEPARIN SODIUM (PORCINE) LOCK FLUSH IV SOLN 100 UNIT/ML 5 ML: 100 SOLUTION at 10:59

## 2022-11-15 NOTE — PROGRESS NOTES
Port site scrubbed with Chloraprep for 30 seconds. Accessed port using sterile technique.  See documentation flowsheet. Tolerated port access and draw without complaint. Port heparin locked and de-accessed.     Brandy Carrillo RN

## 2022-11-15 NOTE — TELEPHONE ENCOUNTER
----- Message from Patito Alaniz RN sent at 11/14/2022  2:06 PM CST -----  Thanks, corrected.  Patito  ----- Message -----  From: Natalia Gray MD  Sent: 11/11/2022  12:52 PM CST  To: Patito Alaniz RN    Follow up labs should include free T4 (ie, not be TSh reflex). The TSH will be the last thing to improve.  You need to see the impact on the free T4 to tell what has really happened.  Natalia Gray    ----- Message -----  From: Patito Alaniz RN  Sent: 11/11/2022  10:11 AM CST  To: Natalia Gray MD, Akshat Tobar MD    Dose adjusted and repeat T3 total and TSH w/ T4 reflex placed for 2 months from now  Patito  ----- Message -----  From: Akshat Bearden MD  Sent: 11/11/2022   8:49 AM CST  To: Natalia Gray MD, Patito Alaniz RN    Thank you very much, appreciated.    Akshat  ----- Message -----  From: Natalia Gray MD  Sent: 11/10/2022   9:42 PM CST  To: Natalia Gray MD, Akshat Tobar MD    E-consult is probably the quickest way to get answers to questions like this.    I recommend to reduce the levothyroxine to 150 mcg/day and repeat labs in 2-3 months.    Natalia Gray    ----- Message -----  From: Akshat Bearden MD  Sent: 11/7/2022  12:09 PM CST  To: MD Dr. Marina Smyth,    We will formally refer this patient to your care, 1 year post alloHCT for ALL, in the meantime I was hoping to get your input if possible:    - Hx of graves disease; On synthroid 175 mcg daily. TSH normal 4/6/2022 no reflex to T4.  Recheck on 10/18 T3 low at 0.03, free T3 and T4 within normal.     Those were checked int he setting of HTN and LE edema that were likely drug related and now beiong addressed.      Would you recommend any change to current treatment dose or recheck and monitor for now?    Happy to talk as well [phone].    Thank you  Akshat Tobar (BMT staff)

## 2022-11-15 NOTE — PROGRESS NOTES
This is a recent snapshot of the patient's [facility] medical record.  For current drug dose and complete information and questions, call [phone] or In Basket pool, [facility] home infusion ([id number])  CSN Number:  [id number]

## 2022-11-18 ENCOUNTER — HOSPITAL ENCOUNTER (OUTPATIENT)
Dept: MRI IMAGING | Facility: CLINIC | Age: 62
Discharge: HOME | End: 2022-11-18
Attending: INTERNAL MEDICINE | Admitting: INTERNAL MEDICINE
Payer: COMMERCIAL

## 2022-11-18 DIAGNOSIS — D89.813 GVHD AS COMPLICATION OF BONE MARROW TRANSPLANT (H): ICD-10-CM

## 2022-11-18 DIAGNOSIS — Z94.81 STATUS POST BONE MARROW TRANSPLANT (H): ICD-10-CM

## 2022-11-18 DIAGNOSIS — T86.09 GVHD AS COMPLICATION OF BONE MARROW TRANSPLANT (H): ICD-10-CM

## 2022-11-18 PROCEDURE — 74181 MRI ABDOMEN W/O CONTRAST: CPT

## 2022-11-18 PROCEDURE — 74181 MRI ABDOMEN W/O CONTRAST: CPT | Mod: 26 | Performed by: STUDENT IN AN ORGANIZED HEALTH CARE EDUCATION/TRAINING PROGRAM

## 2022-11-22 ENCOUNTER — APPOINTMENT (OUTPATIENT)
Dept: LAB | Facility: CLINIC | Age: 62
End: 2022-11-22
Attending: INTERNAL MEDICINE
Payer: COMMERCIAL

## 2022-11-22 ENCOUNTER — ONCOLOGY VISIT (OUTPATIENT)
Dept: TRANSPLANT | Facility: CLINIC | Age: 62
End: 2022-11-22
Attending: INTERNAL MEDICINE
Payer: COMMERCIAL

## 2022-11-22 VITALS
RESPIRATION RATE: 16 BRPM | WEIGHT: 259.6 LBS | SYSTOLIC BLOOD PRESSURE: 140 MMHG | BODY MASS INDEX: 34.26 KG/M2 | HEART RATE: 86 BPM | TEMPERATURE: 98 F | OXYGEN SATURATION: 98 % | DIASTOLIC BLOOD PRESSURE: 78 MMHG

## 2022-11-22 DIAGNOSIS — D89.813 GVHD AS COMPLICATION OF BONE MARROW TRANSPLANT (H): ICD-10-CM

## 2022-11-22 DIAGNOSIS — E78.2 MIXED HYPERLIPIDEMIA: ICD-10-CM

## 2022-11-22 DIAGNOSIS — C91.01 ACUTE LYMPHOBLASTIC LEUKEMIA (ALL) IN REMISSION (H): ICD-10-CM

## 2022-11-22 DIAGNOSIS — Z94.81 STATUS POST BONE MARROW TRANSPLANT (H): ICD-10-CM

## 2022-11-22 DIAGNOSIS — I10 HYPERTENSION, ESSENTIAL: ICD-10-CM

## 2022-11-22 DIAGNOSIS — E83.111 IRON OVERLOAD DUE TO REPEATED RED BLOOD CELL TRANSFUSIONS: Primary | ICD-10-CM

## 2022-11-22 DIAGNOSIS — T86.09 GVHD AS COMPLICATION OF BONE MARROW TRANSPLANT (H): ICD-10-CM

## 2022-11-22 DIAGNOSIS — Z94.81 STATUS POST BONE MARROW TRANSPLANT (H): Primary | ICD-10-CM

## 2022-11-22 LAB
ALBUMIN SERPL BCG-MCNC: 4 G/DL (ref 3.5–5.2)
ALP SERPL-CCNC: 146 U/L (ref 40–129)
ALT SERPL W P-5'-P-CCNC: 44 U/L (ref 10–50)
ANION GAP SERPL CALCULATED.3IONS-SCNC: 11 MMOL/L (ref 7–15)
AST SERPL W P-5'-P-CCNC: 39 U/L (ref 10–50)
BASOPHILS # BLD AUTO: 0 10E3/UL (ref 0–0.2)
BASOPHILS NFR BLD AUTO: 0 %
BILIRUB SERPL-MCNC: 1.1 MG/DL
BUN SERPL-MCNC: 51.1 MG/DL (ref 8–23)
CALCIUM SERPL-MCNC: 9.7 MG/DL (ref 8.8–10.2)
CHLORIDE SERPL-SCNC: 97 MMOL/L (ref 98–107)
CK SERPL-CCNC: 65 U/L (ref 39–308)
CMV DNA SPEC NAA+PROBE-ACNC: NOT DETECTED IU/ML
CREAT SERPL-MCNC: 1.25 MG/DL (ref 0.67–1.17)
DEPRECATED HCO3 PLAS-SCNC: 27 MMOL/L (ref 22–29)
EOSINOPHIL # BLD AUTO: 0 10E3/UL (ref 0–0.7)
EOSINOPHIL NFR BLD AUTO: 0 %
ERYTHROCYTE [DISTWIDTH] IN BLOOD BY AUTOMATED COUNT: 17.1 % (ref 10–15)
GFR SERPL CREATININE-BSD FRML MDRD: 64 ML/MIN/1.73M2
GLUCOSE SERPL-MCNC: 98 MG/DL (ref 70–99)
HCT VFR BLD AUTO: 33.7 % (ref 40–53)
HGB BLD-MCNC: 11.3 G/DL (ref 13.3–17.7)
IMM GRANULOCYTES # BLD: 0.2 10E3/UL
IMM GRANULOCYTES NFR BLD: 2 %
LYMPHOCYTES # BLD AUTO: 2.3 10E3/UL (ref 0.8–5.3)
LYMPHOCYTES NFR BLD AUTO: 19 %
MAGNESIUM SERPL-MCNC: 2.1 MG/DL (ref 1.7–2.3)
MCH RBC QN AUTO: 34.3 PG (ref 26.5–33)
MCHC RBC AUTO-ENTMCNC: 33.5 G/DL (ref 31.5–36.5)
MCV RBC AUTO: 102 FL (ref 78–100)
MONOCYTES # BLD AUTO: 0.9 10E3/UL (ref 0–1.3)
MONOCYTES NFR BLD AUTO: 8 %
NEUTROPHILS # BLD AUTO: 8.9 10E3/UL (ref 1.6–8.3)
NEUTROPHILS NFR BLD AUTO: 71 %
NRBC # BLD AUTO: 0.1 10E3/UL
NRBC BLD AUTO-RTO: 1 /100
PLATELET # BLD AUTO: 139 10E3/UL (ref 150–450)
POTASSIUM SERPL-SCNC: 4.8 MMOL/L (ref 3.4–5.3)
PROT SERPL-MCNC: 6.4 G/DL (ref 6.4–8.3)
RBC # BLD AUTO: 3.29 10E6/UL (ref 4.4–5.9)
SODIUM SERPL-SCNC: 135 MMOL/L (ref 136–145)
WBC # BLD AUTO: 12.3 10E3/UL (ref 4–11)

## 2022-11-22 PROCEDURE — 250N000011 HC RX IP 250 OP 636: Performed by: INTERNAL MEDICINE

## 2022-11-22 PROCEDURE — G0463 HOSPITAL OUTPT CLINIC VISIT: HCPCS

## 2022-11-22 PROCEDURE — 94642 AEROSOL INHALATION TREATMENT: CPT | Performed by: INTERNAL MEDICINE

## 2022-11-22 PROCEDURE — 99215 OFFICE O/P EST HI 40 MIN: CPT | Performed by: INTERNAL MEDICINE

## 2022-11-22 PROCEDURE — 85025 COMPLETE CBC W/AUTO DIFF WBC: CPT | Performed by: INTERNAL MEDICINE

## 2022-11-22 PROCEDURE — 83735 ASSAY OF MAGNESIUM: CPT | Performed by: INTERNAL MEDICINE

## 2022-11-22 PROCEDURE — 80053 COMPREHEN METABOLIC PANEL: CPT | Performed by: INTERNAL MEDICINE

## 2022-11-22 PROCEDURE — 94640 AIRWAY INHALATION TREATMENT: CPT | Performed by: INTERNAL MEDICINE

## 2022-11-22 PROCEDURE — 82550 ASSAY OF CK (CPK): CPT | Performed by: INTERNAL MEDICINE

## 2022-11-22 PROCEDURE — 36591 DRAW BLOOD OFF VENOUS DEVICE: CPT | Performed by: INTERNAL MEDICINE

## 2022-11-22 RX ORDER — ESCITALOPRAM OXALATE 10 MG/1
10 TABLET ORAL DAILY
Qty: 30 TABLET | Refills: 3 | Status: SHIPPED | OUTPATIENT
Start: 2022-11-22 | End: 2023-02-23

## 2022-11-22 RX ORDER — AMLODIPINE BESYLATE 2.5 MG/1
2.5 TABLET ORAL DAILY
Qty: 30 TABLET | Refills: 1 | Status: SHIPPED | OUTPATIENT
Start: 2022-11-22 | End: 2022-12-06

## 2022-11-22 RX ORDER — HEPARIN SODIUM (PORCINE) LOCK FLUSH IV SOLN 100 UNIT/ML 100 UNIT/ML
5 SOLUTION INTRAVENOUS
Status: DISCONTINUED | OUTPATIENT
Start: 2022-11-22 | End: 2022-11-22 | Stop reason: HOSPADM

## 2022-11-22 RX ADMIN — Medication 5 ML: at 08:21

## 2022-11-22 ASSESSMENT — PAIN SCALES - GENERAL: PAINLEVEL: NO PAIN (0)

## 2022-11-22 NOTE — PROGRESS NOTES
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    HPI:    Eyes still bothersome but improved overall except some days when it is not, seems like the right eyelids that was fitted 2 weeks ago fell off in 1 or 2 days, he has an appointment next week with ophthalmology, h they most recently instructed to use eyedrops every 1-2 hours when awake.  He reports stable vision.  Mouth with no recent ulcers or flares. Tolerating his prednisone taper well it seems. Continues with dex s/s as well.  Lower extremity edema improved. No recurrent episodes of epistaxis that we will monitor closely while on belumosudil but no other signs of bleeding clinically of concern.        Review of Systems                                                                                                                                         12 point Review of systems was negative except as detailed above     PHYSICAL EXAM                                                                                                                                                 KPS: 80  BP (!) 140/78 (BP Location: Left arm, Patient Position: Sitting, Cuff Size: Adult Large)   Pulse 86   Temp 98  F (36.7  C) (Oral)   Resp 16   Wt 117.8 kg (259 lb 9.6 oz)   SpO2 98%   BMI 34.26 kg/m    Blood pressure at home, generally systolic blood pressure less than 145    Wt Readings from Last 4 Encounters:   11/22/22 117.8 kg (259 lb 9.6 oz)   11/08/22 114.9 kg (253 lb 3.2 oz)   11/01/22 117.3 kg (258 lb 9.6 oz)   11/01/22 120.2 kg (264 lb 14.4 oz)      General: NAD.  HEENT: sclera anicteric, injected conjunctivae. No lichenoid changes in oral mucosa, no ulcers seen.    Lungs:  CTA b/l  no wheezes  CV: RRR  no gallop  Abd; soft, NABS, protuberant  Ext/ Skin: Skin bruising on bilateral forearms and previous skin tears have now healed, Bites also healed    LE 1+ bilateral  edema in lower extremities; wearing compression socks-improved  Access: port-a-cath    cGVHD therapy started on February 24 2022  - Baseline NIH scoring 2/24/2022 : skin 2 maculopapular rash/erythema with no sclerotic features, mouth score 1 mild symptoms with lichenoid changes less than 25%, eyes score 2 moderate symptoms without new visual impairments due to KCS requiring lubricant eyedrops more than 3 times a day, overall mild scale for her provider  - 3/25/2022 1 month NIH treatment assessment on PQRST: skin 2, mouth score 1 mild symptoms with NO lichenoid changes, eyes score 2 moderate symptoms w requiring lubricant eyedrops more than 3 times a day, liver score 1 ALT 3.7x ULN and nl bilirubin   - 3/31  Mouth clear; eyes minimal LFT still abn; no joint or new GI sx  - 4/28 Mouth clear, Left>R eye is mild sclera erythema, lids are pink and irritated appearing, LFT's slow improvement, no new joint, skin, or GI symptoms.   -5/11 mouth with mild erythema but no lichenoid changes, eyes using eyedrops 4-6 times a day score of 2, LFTs significantly improved from baseline slight elevation in alk phos and AST with normal bilirubin, skin with hyperpigmentation from old acute GVHD no active skin rashes, no GI symptoms no musculoskeletal complaints.  -5/26 Mouth with very slight lichenoid changes, erythema.  Eyes still using eyedrops 4-6x per day.  LFTs essentially normal with slight elevation in alk phos.  Skin with hyperpigmentation from old acute GVHD, no active rashes, no GI or MSK concerns.  Skin 0, mouth 0 but with lichenoid changes, eyes 2  -6/7/22 Mouth with no lichenoid changes, erythema.  Eyes still using eyedrops 4-6x per day.  LFTs essentially normal with slight elevation in alk phos.  Skin with hyperpigmentation from old acute GVHD, no active rashes, no GI or MSK concerns.  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 S/p allo sib PBSCT. (ABO matched)  HCT-CI score: 3 (prior solid tumor)  Day +100 bone marrow biopsy is 100% donor with no morphologic or flow cytometric evidence of leukemia BCR abl is pending.  - Day +180 restaging BM bx- still in CR  100% donor;  2/16/22 BCR ABL major breakpoint undetectable.   - 1 year restaging 8/18/2022: Markedly hypocellular bone marrow [less than 10% cellular] with maturing trilineage hematopoiesis and no definite morphologic or immunophenotypic evidence for involvement by B lymphoblastic leukemia. BCRABL1 PCR not detected, bone marrow % donor. FISH NORMAL No evidence of BCR-ABL1 fusion  - Maintenance with TKI posttransplantation given his Ph positive ALL that have not been started previously presumed secondary to multiple active issues specifically infections and COVID.  Per Avila Tobar: will reconsider this patient will think about whether this is something he would like to consider he was previously on Dasatinib, we would start on a low dose and increase as tolerated.  This is on hold now pending active GVHD therapy, we discussed on this visit 10/18 in agreement with holding off.     2.  HEME:  - Pulmonary emboli: He developed a pulmonary emboli in the setting of receiving PEG asparaginase (ie provoked thrombus).  Xarelto complete.   -At 1 year anniversary visit completed screening for secondary iron overload, 8/2022 ferritin 1023, recheck on 10/18 increased to 2348, expect this is acute phase reactant with recent GVHD flare.    - 11/8 ferritin 2812, MRI completed 11/2022 confirming  iron overload, therefore today 11/22 I discussed with the patient starting phlebotomies as I worry about his tolerance to iron chelators specifically regarding his renal function, will start phlebotomies 250 mL every 4 weeks as tolerated and plan to keep his hemoglobin more than 11-volume can be titrated as well as frequency depending on his tolerance and hemoglobin as well as follow-up ferritin levels.      3.  FEN/Renal:  - Cr improved with switching patient to sirolimus briefly, then with increased Cr on 10/11 with third spacing. Total protein to creatinine ratio 0.41 on 10/11 recheck 0.39 on 10/18. 11/8 creatinine up to 1.4, 11/22 creatinine down to 1.25   - He has a history of renal cancer and resection. Has a single kidney.  - 11/1/2022 seen by nephrology: multifactorial, focus on BP control, Started Chlorthalidone 12.5 mg daily and reduce amlodipine to 2.5 mg per day, Low salt diet and reduce ice tea. Next step: may increase chlorthalidone if Cr stable but if not improving in swelling, may start torsemide Follow-up in 2 month   -Hypomagnesemia,11/8 decrease magnesium to 2 tablets 2 times a day-seems to have triggered more muscle cramps specifically in his right arm cussed with the patient uptitrating that back up for symptom control of his cramps, he continues on vitamin E for muscle cramps as well.     4.    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   #Skin GVHD- history of biopsy proven GVHD of the skin 8/30/2021; resolved.   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 WNL today 11/22  # Ocular GVHD: follows with ophthalmology. Maxitrol to lids, continue refreshing drops QID. Score 3  Followed closely by Dr. Juarez and had ocular fittings 11/8 however no lens seen on exam today he will follow-up with her team for refitting   # Oral GVHD: dex s/s three-4 times a day; tac ointment to lips as needed.  Clotrimazole cream added after  seeing dermatology.  Lichenoid changes have resolved with no other ulcers since 11/8     Previous therapies  Tacrolimus-previously decided to stay on same dose, no checks of levels if clinically stable, and no need switch to sirolimus. (keep tac low as gvhd is quiet and viremia). 5/10 level 45 with starting of COVID therapy and D-D intractions (Paxlovid for COVID19, which has a drug interaction with tacrolimus-Ritonavir increases serum levels of tacrolimus).   Tacrolimus level subtherapeutic, increase to 2.5 mg twice daily recently, 8/23/2022 instructed to change tacrolimus to 2 mg twice daily. 9/6 with mild NEGRA, hyperkalemia, hypomagnesemia, and reports some tremors and cramps, suspect tacrolimus to be high therefore empirically decreased to 1.5 twice daily, skip morning dose of tacrolimus and took 2 mg today after his afternoon labs. Decrease to 1mg BID on Saturday.  Sirolimus  9/21 despite fluids and a dose adjustment of tacrolimus patient seem to have persistence NORBERTO related NEGRA, therefore after discussion with the patient decision was made to transition to sirolimus that was started on 9/21, patient initially seem to tolerate this well with normalization of kidney function  10/11 presented with flares of ocular and oral GVHD, fluid retention and gain of 5-6 kg, lower extremity edema, abdominal distention, total protein to creatinine ratio elevated at 0.4, in addition to elevation in BUN and creatinine, HTN.  Picture most consistent with sirolimus related side effects.  Less likely that the patient will tolerate even a lower level of sirolimus.  Therefore the patient was instructed to stop sirolimus, last dose on 10/10. 10/14 level 3.5 appropriately trending down.     Corticosteroids  3/1-3/16: prednisone 100mg every day  3/17 75mg every day   3/31 75 alt with 50  4/6: 75 alt with 25mg every other day  4/12 pred tapered to 60 alternating with 25mg   4/15 In setting of viruses trying to reactivate,GVHD stable:  Taper 60/15mg alternating.  4/20 decrease pred further to 50/10.    4/25 taper pred to 50/0 every other day as long as symptoms stable.   5/2 Pred decrease 40/0 alternating every other day.   5/13 decrease to 30/0  5/20 decrease to 20/0 then slowly by 5 mg weekly  6/7 decrease to 10/0  Went up to 15/0 with mild increased LFTs  8/23/2022 increased to 20/0, with persistence of oral ulcers and active ocular GVHD.  Discussed with the patient the importance of stopping chewing of nicotine gums for prolonged hours as that would not help with the healing of the oral ulcers.  I discussed with the patient alternatives of nicotine patches that he will reconsider and let me know.  9/6 decreased to 15 alternating with 0  9/13 decreased to 10 alternating with 0  9/21 discontinued tacrolimus given persistent NEGRA and single kidney and start the patient on sirolimus without loading dose.  We will get a list of possible side effects and adverse events from the Pharm.D. see sirolimus section above.  10/11 GVHD flare. Sirolimus stopped. Resume prednisone 40 mg daily as of 10/12, no change with mildly worsening eye pain 10/14  Reduce pred to 35mg 10/25 and 25mg 11/1 11/8 20 mg   11/22 15 mg    Belumosudil  Patient intolerant/with disease flares on tacrolimus and sirolimus see above.  Discussed with the patient the different options for therapy of chronic GVHD that are FDA approved.  Given the side effect profiles after discussing in detail and given the least nephrotoxicity and expected response, taking into account other metabolic effect as well.  We will proceed with prior authorization with belumosudil.  Patient was given material to review for possible expected adverse events and side effects with both Belumosodil and ruxolitinib.   --- Started on 10/18 in p.m.     5.  ID: Afebrile with no current signs or symptoms of infection    # cat bite/scratch: doxy 100mg BID x 10 days; completes 11/3/22.  Bite has healed, no signs of  infection.     #History of COVID   Positive via homed test 5/8. Treated with Paxlovid with resolution of symptoms.  Had recurrence on 5/18. Resolved  Covid 11/21, 12/21, received his influenza annual vaccine as well as COVID boosters locally 11/16     #History of pulmonary infiltrates:   4/20 CT chest with new RUL infiltrate. Highly immunocompromised. 4/22 Fungitell/asp GM were neg. BAL 4/29 NGTD, increased micafungin to 150mg IV daily-- f/u 6/1 Dr Garcia CT with mixed results, followed with Dr. Garcia August 2022 with resolution of pulmonary nodules and normalization of LFTs we will switch patient will switch patient to posaconazole prophylactic dose and monitor LFTs and any infectious concerns closely (wnl stable 10/14)     #History of CMV viremia:    - CMV viremia up to 1100. 4/15 started valcyte 900mg PO BID. 4/20 CMV <137, 5/10 ND, decreased to 450mg BID 4/30 - continue 4 weeks as long as CMV <137 till 5/28 + weekly CMV  - Switch to acyclovir after completion of current therapy 5/28. 10/11 CMV ND. Check monthly on IST, 11/8 CMV <137, 11/22 ND, recheck 12/8     - PPx:   ACV  Posaconazole (previously on micafungin with LFts abl, hx of pulmonary nodules needign treatment dose).   Pentamidine, last 11/22  Azithro 250mg daily (stopped levaquin due to possible tendonitis).      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 S/p 4/22 and 5/4/22 rituximab 375mg/m2 5/10 ND x2, 6/7 ND, 8/18/2022 971, 10/11 ND, check every other month on IST, 11/8 ND  S/p covid vaccine series 12/2021  S/p heathereld   Deferred annual vaccines in the setting of GVHD and immunosuppression therapy     6. Endo:   - Hx of graves disease; On synthroid 175 mcg daily. TSH normal 4/6/2022 no reflex to T4.  Recheck on 10/18 T3 low at 0.03, free T3 and T4 within normal.  Subclinical hyperthyroidism, 11/22/222 and discussion with endocrine, reduce the levothyroxine to 150 mcg/day and repeat labs in 2-3.  He has follow-up appointment with  endocrine    - HLD: 11/2022 cholesterol 355, triglycerides 153, -- 11/8 started rosuvastatin 5 mg daily we will recheck lipid profile in 3 months, 11/22 CPK within normal     7. CV:   - Hypertension on Norvasc: Decreased to 2.5mg, added thiazide as above nephrology section  - TTE most recently 10/2022     8. GI:   -Omeprazole for heartburn  -LFTs as above, now normal. 9/6 decreased ursodiol to daily      9. Psych:   - Situational anxiety - lexapro 10mg daily.   - Insomnia: worse on steroids. Ativan, trazadone, melatonin     10. MSK: left food drop, PT. Occasional muscle cramps discussed optimizing vitamin D levels and considering vitamin D, some of the symptomatology of muscle cramps are likely related to chronic GVHD.     11. HCM/Age appropriate cancer screening:  -Discussed with the patient importance of age-appropriate cancer screening including colonoscopies, he is due for this.  -Discussed importance of at least once a year vitamin D level check, 8/18/2022 wnl  -Screening for secondary iron overload, see heme section above, start vitamins on 12/13  -Yearly TSH 2022 wnl; yearly lipid panel - see GI section above  -9/6/2022 DEXA scan Based on BMD diagnosis is consistent with normal bone density based on WHO criteria Ref. 1   -PFTs 9/20/22, see above  -Metabolic other, 8/2022 testosterone within normal  -11/22/2022 discussed with the patient the challenges and the stresses of chronic illness and healthcare fatigue.  Patient had previously been on Lexapro that we restarted today confirmed with pharmacy that there are no drug drug interactions.  Additionally we will refer patient to PMNR to help with physical rehab and strength to help with fatigue.  Our social workers will also reach out to Nik and his wife for further resources including support groups for patients with chronic illness and fatigue post transplant.        Plan:   -Decrease prednisone from 20 mg to 15 mg  -Start small-volume phlebotomies  every 4weeks 250 mL to start on his next follow-up visit with us on 12/13 with  [facility].  I will kindly asked Dr. Cote who happens to be doing at that time to continue following him while I am on leave.  -CMP from today not detected continue to monitor intermittently  -Flare of cramps specifically in his right arm after recent decrease magnesium to 2 tablets 2 times a day, magnesium within normal however discussed with him uptitrating as needed for symptom management.  -He has monthly pentamadine next appointment scheduled  -Continue rosuvastatin 5 mg daily   -Follow-up with nephrology as well as Follow up with Dr. Juarez for eyes      I spent 45 minutes in the care of this patient today, which included time necessary for preparation for the visit, obtaining history, ordering medications/tests/procedures as medically indicated, review of pertinent medical literature, counseling of the patient, communication of recommendations to the care team, and documentation time.

## 2022-11-22 NOTE — NURSING NOTE
Chief Complaint   Patient presents with     Port Draw     Vitals taken, port accessed, labs drawn, heparin locked, de-accessed, checked into next appt     BP (!) 140/78 (BP Location: Left arm, Patient Position: Sitting, Cuff Size: Adult Large)   Pulse 86   Temp 98  F (36.7  C) (Oral)   Resp 16   Wt 117.8 kg (259 lb 9.6 oz)   SpO2 98%   BMI 34.26 kg/m    Stewart Jordan RN on 11/22/2022 at 8:11 AM

## 2022-11-22 NOTE — PROGRESS NOTES
Patient was seen today for a Pentamidine nebulizer tx ordered by Natty Gay PA-C.    Patient was first given 2.5 mg of albuterol nebulizer NDC# 96843-798-85), after which Pentamidine 300 mg (NDC# 63323-877-15)  (Lot # [id number]) mixed with 6cc Sterile H20 was administered through a filtered nebulizer.    Pre-treatment: SpO2 = 97%   HR = 82   BBS = clear   Post-treatment: SpO2 = 97%  HR = 82  BBS = clear    No adverse side effects noted by the patient.    This service today was provided under the direct supervision of Dr. Dotson, who was available if needed.     Procedure was completed by Willie Blackwell, RRT, [facility].

## 2022-11-22 NOTE — LETTER
11/22/2022         RE: Nik Nava  [id number] [facility] [id number]        Dear Colleague,    Thank you for referring your patient, Nik Nava, to the [facility]LOOD AND MARROW [facility]ANSPLANT PROGRAM [place]. Please see a copy of my visit note below.        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    HPI:    Eyes still bothersome but improved overall except some days when it is not, seems like the right eyelids that was fitted 2 weeks ago fell off in 1 or 2 days, he has an appointment next week with ophthalmology, h they most recently instructed to use eyedrops every 1-2 hours when awake.  He reports stable vision.  Mouth with no recent ulcers or flares. Tolerating his prednisone taper well it seems. Continues with dex s/s as well.  Lower extremity edema improved. No recurrent episodes of epistaxis that we will monitor closely while on belumosudil but no other signs of bleeding clinically of concern.        Review of Systems                                                                                                                                         12 point Review of systems was negative except as detailed above     PHYSICAL EXAM                                                                                                                                                 KPS: 80  BP (!) 140/78 (BP Location: Left arm, Patient Position: Sitting, Cuff Size: Adult Large)   Pulse 86   Temp 98  F (36.7  C) (Oral)   Resp 16   Wt 117.8 kg (259 lb 9.6 oz)   SpO2 98%   BMI 34.26 kg/m    Blood pressure at home, generally systolic blood pressure less than 145    Wt Readings from Last 4 Encounters:   11/22/22 117.8 kg (259 lb 9.6 oz)   11/08/22 114.9 kg (253 lb 3.2 oz)   11/01/22 117.3 kg (258 lb 9.6 oz)   11/01/22 120.2 kg (264 lb 14.4 oz)      General: NAD.  HEENT: sclera anicteric,  injected conjunctivae. No lichenoid changes in oral mucosa, no ulcers seen.    Lungs:  CTA b/l  no wheezes  CV: RRR  no gallop  Abd; soft, NABS, protuberant  Ext/ Skin: Skin bruising on bilateral forearms and previous skin tears have now healed, Bites also healed    LE 1+ bilateral edema in lower extremities; wearing compression socks-improved  Access: port-a-cath    cGVHD therapy started on February 24 2022  - Baseline NIH scoring 2/24/2022 : skin 2 maculopapular rash/erythema with no sclerotic features, mouth score 1 mild symptoms with lichenoid changes less than 25%, eyes score 2 moderate symptoms without new visual impairments due to KCS requiring lubricant eyedrops more than 3 times a day, overall mild scale for her provider  - 3/25/2022 1 month NIH treatment assessment on PQRST: skin 2, mouth score 1 mild symptoms with NO lichenoid changes, eyes score 2 moderate symptoms w requiring lubricant eyedrops more than 3 times a day, liver score 1 ALT 3.7x ULN and nl bilirubin   - 3/31  Mouth clear; eyes minimal LFT still abn; no joint or new GI sx  - 4/28 Mouth clear, Left>R eye is mild sclera erythema, lids are pink and irritated appearing, LFT's slow improvement, no new joint, skin, or GI symptoms.   -5/11 mouth with mild erythema but no lichenoid changes, eyes using eyedrops 4-6 times a day score of 2, LFTs significantly improved from baseline slight elevation in alk phos and AST with normal bilirubin, skin with hyperpigmentation from old acute GVHD no active skin rashes, no GI symptoms no musculoskeletal complaints.  -5/26 Mouth with very slight lichenoid changes, erythema.  Eyes still using eyedrops 4-6x per day.  LFTs essentially normal with slight elevation in alk phos.  Skin with hyperpigmentation from old acute GVHD, no active rashes, no GI or MSK concerns.  Skin 0, mouth 0 but with lichenoid changes, eyes 2  -6/7/22 Mouth with no lichenoid changes, erythema.  Eyes still using eyedrops 4-6x per day.  LFTs  essentially normal with slight elevation in alk phos.  Skin with hyperpigmentation from old acute GVHD, no active rashes, no GI or MSK concerns.  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  Acute lymphoblastic leukemia, [place] chromosome positive in CR, MRD neg. S/p allo sib PBSCT. (ABO matched)  HCT-CI score: 3 (prior solid tumor)  Day +100 bone marrow biopsy is 100% donor with no morphologic or flow cytometric evidence of leukemia BCR abl is pending.  - Day +180 restaging BM bx- still in CR  100% donor;  2/16/22 BCR ABL major breakpoint undetectable.   - 1 year restaging 8/18/2022: Markedly hypocellular bone marrow [less than 10% cellular] with maturing trilineage hematopoiesis and no definite morphologic or immunophenotypic evidence for involvement by B lymphoblastic leukemia. BCRABL1 PCR not detected, bone marrow % donor. FISH NORMAL No evidence of BCR-ABL1 fusion  - Maintenance with TKI posttransplantation given his Ph positive ALL that have not been started previously presumed secondary to multiple active issues specifically infections and COVID.  Per Avila Tobar: will reconsider this patient will think about whether this is something he would like to consider he was previously on Dasatinib, we would start on a low dose and increase as tolerated.  This is on hold now pending active GVHD therapy, we discussed on this visit 10/18 in agreement with holding off.     2.  HEME:  - Pulmonary emboli: He developed a pulmonary emboli in the setting of receiving PEG asparaginase (ie provoked  thrombus).  Xarelto complete.   -At 1 year anniversary visit completed screening for secondary iron overload, 8/2022 ferritin 1023, recheck on 10/18 increased to 2348, expect this is acute phase reactant with recent GVHD flare.    - 11/8 ferritin 2812, MRI completed 11/2022 confirming iron overload, therefore today 11/22 I discussed with the patient starting phlebotomies as I worry about his tolerance to iron chelators specifically regarding his renal function, will start phlebotomies 250 mL every 4 weeks as tolerated and plan to keep his hemoglobin more than 11-volume can be titrated as well as frequency depending on his tolerance and hemoglobin as well as follow-up ferritin levels.      3.  FEN/Renal:  - Cr improved with switching patient to sirolimus briefly, then with increased Cr on 10/11 with third spacing. Total protein to creatinine ratio 0.41 on 10/11 recheck 0.39 on 10/18. 11/8 creatinine up to 1.4, 11/22 creatinine down to 1.25   - He has a history of renal cancer and resection. Has a single kidney.  - 11/1/2022 seen by nephrology: multifactorial, focus on BP control, Started Chlorthalidone 12.5 mg daily and reduce amlodipine to 2.5 mg per day, Low salt diet and reduce ice tea. Next step: may increase chlorthalidone if Cr stable but if not improving in swelling, may start torsemide Follow-up in 2 month   -Hypomagnesemia,11/8 decrease magnesium to 2 tablets 2 times a day-seems to have triggered more muscle cramps specifically in his right arm cussed with the patient uptitrating that back up for symptom control of his cramps, he continues on vitamin E for muscle cramps as well.     4.    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   #Skin GVHD- history of biopsy proven GVHD of the skin 8/30/2021; resolved.   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 WNL today 11/22  # Ocular GVHD: follows with ophthalmology. Maxitrol to lids, continue  refreshing drops QID. Score 3  Followed closely by Dr. Juarez and had ocular fittings 11/8 however no lens seen on exam today he will follow-up with her team for refitting   # Oral GVHD: dex s/s three-4 times a day; tac ointment to lips as needed.  Clotrimazole cream added after seeing dermatology.  Lichenoid changes have resolved with no other ulcers since 11/8     Previous therapies  Tacrolimus-previously decided to stay on same dose, no checks of levels if clinically stable, and no need switch to sirolimus. (keep tac low as gvhd is quiet and viremia). 5/10 level 45 with starting of COVID therapy and D-D intractions (Paxlovid for COVID19, which has a drug interaction with tacrolimus-Ritonavir increases serum levels of tacrolimus).   Tacrolimus level subtherapeutic, increase to 2.5 mg twice daily recently, 8/23/2022 instructed to change tacrolimus to 2 mg twice daily. 9/6 with mild NEGRA, hyperkalemia, hypomagnesemia, and reports some tremors and cramps, suspect tacrolimus to be high therefore empirically decreased to 1.5 twice daily, skip morning dose of tacrolimus and took 2 mg today after his afternoon labs. Decrease to 1mg BID on Saturday.  Sirolimus  9/21 despite fluids and a dose adjustment of tacrolimus patient seem to have persistence NORBERTO related NEGRA, therefore after discussion with the patient decision was made to transition to sirolimus that was started on 9/21, patient initially seem to tolerate this well with normalization of kidney function  10/11 presented with flares of ocular and oral GVHD, fluid retention and gain of 5-6 kg, lower extremity edema, abdominal distention, total protein to creatinine ratio elevated at 0.4, in addition to elevation in BUN and creatinine, HTN.  Picture most consistent with sirolimus related side effects.  Less likely that the patient will tolerate even a lower level of sirolimus.  Therefore the patient was instructed to stop sirolimus, last dose on 10/10. 10/14 level  3.5 appropriately trending down.     Corticosteroids  3/1-3/16: prednisone 100mg every day  3/17 75mg every day   3/31 75 alt with 50  4/6: 75 alt with 25mg every other day  4/12 pred tapered to 60 alternating with 25mg   4/15 In setting of viruses trying to reactivate,GVHD stable: Taper 60/15mg alternating.  4/20 decrease pred further to 50/10.    4/25 taper pred to 50/0 every other day as long as symptoms stable.   5/2 Pred decrease 40/0 alternating every other day.   5/13 decrease to 30/0  5/20 decrease to 20/0 then slowly by 5 mg weekly  6/7 decrease to 10/0  Went up to 15/0 with mild increased LFTs  8/23/2022 increased to 20/0, with persistence of oral ulcers and active ocular GVHD.  Discussed with the patient the importance of stopping chewing of nicotine gums for prolonged hours as that would not help with the healing of the oral ulcers.  I discussed with the patient alternatives of nicotine patches that he will reconsider and let me know.  9/6 decreased to 15 alternating with 0  9/13 decreased to 10 alternating with 0  9/21 discontinued tacrolimus given persistent NEGRA and single kidney and start the patient on sirolimus without loading dose.  We will get a list of possible side effects and adverse events from the Pharm.D. see sirolimus section above.  10/11 GVHD flare. Sirolimus stopped. Resume prednisone 40 mg daily as of 10/12, no change with mildly worsening eye pain 10/14  Reduce pred to 35mg 10/25 and 25mg 11/1 11/8 20 mg   11/22 15 mg    Belumosudil  Patient intolerant/with disease flares on tacrolimus and sirolimus see above.  Discussed with the patient the different options for therapy of chronic GVHD that are FDA approved.  Given the side effect profiles after discussing in detail and given the least nephrotoxicity and expected response, taking into account other metabolic effect as well.  We will proceed with prior authorization with belumosudil.  Patient was given material to review for possible  expected adverse events and side effects with both Belumosodil and ruxolitinib.   --- Started on 10/18 in p.m.     5.  ID: Afebrile with no current signs or symptoms of infection    # cat bite/scratch: doxy 100mg BID x 10 days; completes 11/3/22.  Bite has healed, no signs of infection.     #History of COVID   Positive via homed test 5/8. Treated with Paxlovid with resolution of symptoms.  Had recurrence on 5/18. Resolved  Covid 11/21, 12/21, received his influenza annual vaccine as well as COVID boosters locally 11/16     #History of pulmonary infiltrates:   4/20 CT chest with new RUL infiltrate. Highly immunocompromised. 4/22 Fungitell/asp GM were neg. BAL 4/29 NGTD, increased micafungin to 150mg IV daily-- f/u 6/1 Dr Garcia CT with mixed results, followed with Dr. Garcia August 2022 with resolution of pulmonary nodules and normalization of LFTs we will switch patient will switch patient to posaconazole prophylactic dose and monitor LFTs and any infectious concerns closely (wnl stable 10/14)     #History of CMV viremia:    - CMV viremia up to 1100. 4/15 started valcyte 900mg PO BID. 4/20 CMV <137, 5/10 ND, decreased to 450mg BID 4/30 - continue 4 weeks as long as CMV <137 till 5/28 + weekly CMV  - Switch to acyclovir after completion of current therapy 5/28. 10/11 CMV ND. Check monthly on IST, 11/8 CMV <137, 11/22 ND, recheck 12/8     - PPx:   ACV  Posaconazole (previously on micafungin with LFts abl, hx of pulmonary nodules needign treatment dose).   Pentamidine, last 11/22  Azithro 250mg daily (stopped levaquin due to possible tendonitis).      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 S/p 4/22 and 5/4/22 rituximab 375mg/m2 5/10 ND x2, 6/7 ND, 8/18/2022 971, 10/11 ND, check every other month on IST, 11/8 ND  S/p covid vaccine series 12/2021  S/p evusheld   Deferred annual vaccines in the setting of GVHD and immunosuppression therapy     6. Endo:   - Hx of graves disease; On synthroid 175 mcg daily.  TSH normal 4/6/2022 no reflex to T4.  Recheck on 10/18 T3 low at 0.03, free T3 and T4 within normal.  Subclinical hyperthyroidism, 11/22/222 and discussion with endocrine, reduce the levothyroxine to 150 mcg/day and repeat labs in 2-3.  He has follow-up appointment with endocrine    - HLD: 11/2022 cholesterol 355, triglycerides 153, -- 11/8 started rosuvastatin 5 mg daily we will recheck lipid profile in 3 months, 11/22 CPK within normal     7. CV:   - Hypertension on Norvasc: Decreased to 2.5mg, added thiazide as above nephrology section  - TTE most recently 10/2022     8. GI:   -Omeprazole for heartburn  -LFTs as above, now normal. 9/6 decreased ursodiol to daily      9. Psych:   - Situational anxiety - lexapro 10mg daily.   - Insomnia: worse on steroids. Ativan, trazadone, melatonin     10. MSK: left food drop, PT. Occasional muscle cramps discussed optimizing vitamin D levels and considering vitamin D, some of the symptomatology of muscle cramps are likely related to chronic GVHD.     11. HCM/Age appropriate cancer screening:  -Discussed with the patient importance of age-appropriate cancer screening including colonoscopies, he is due for this.  -Discussed importance of at least once a year vitamin D level check, 8/18/2022 wnl  -Screening for secondary iron overload, see heme section above, start vitamins on 12/13  -Yearly TSH 2022 wnl; yearly lipid panel - see GI section above  -9/6/2022 DEXA scan Based on BMD diagnosis is consistent with normal bone density based on WHO criteria Ref. 1   -PFTs 9/20/22, see above  -Metabolic other, 8/2022 testosterone within normal  -11/22/2022 discussed with the patient the challenges and the stresses of chronic illness and healthcare fatigue.  Patient had previously been on Lexapro that we restarted today confirmed with pharmacy that there are no drug drug interactions.  Additionally we will refer patient to PMNR to help with physical rehab and strength to help with  fatigue.  Our social workers will also reach out to Nik and his wife for further resources including support groups for patients with chronic illness and fatigue post transplant.        Plan:   -Decrease prednisone from 20 mg to 15 mg  -Start small-volume phlebotomies every 4weeks 250 mL to start on his next follow-up visit with us on 12/13 with  QUINTON.  I will kindly asked Dr. Cote who happens to be doing at that time to continue following him while I am on leave.  -CMP from today not detected continue to monitor intermittently  -Flare of cramps specifically in his right arm after recent decrease magnesium to 2 tablets 2 times a day, magnesium within normal however discussed with him uptitrating as needed for symptom management.  -He has monthly pentamadine next appointment scheduled  -Continue rosuvastatin 5 mg daily   -Follow-up with nephrology as well as Follow up with Dr. Juarez for eyes      I spent 45 minutes in the care of this patient today, which included time necessary for preparation for the visit, obtaining history, ordering medications/tests/procedures as medically indicated, review of pertinent medical literature, counseling of the patient, communication of recommendations to the care team, and documentation time.        Akshat Tobar MD

## 2022-11-23 ENCOUNTER — TELEPHONE (OUTPATIENT)
Dept: ONCOLOGY | Facility: CLINIC | Age: 62
End: 2022-11-23

## 2022-11-23 ENCOUNTER — TELEPHONE (OUTPATIENT)
Dept: TRANSPLANT | Facility: CLINIC | Age: 62
End: 2022-11-23

## 2022-11-23 NOTE — TELEPHONE ENCOUNTER
Reports that patient has tickle in throat yesterday when he was seen in clinic. In evening and overnight cough developed. Took temp at 6:30am and was 99.2F, /86; patient's wife administered tylenol. Wife states that lungs CTA. Denies shortness of breath, wheezing. Endorses fatigue; and heart burn which responded to TUMS. Home antigen test for COVID is negative.     Encouraged him to push fluids and rest. Also requested they withhold from tylenol administration unless the fever reached 100.5F or he is uncomfortable otherwise. Recommend Robitussin DM for cough as opposed to dayquil/nyquil as these products contain tylenol. Continue to monitor.

## 2022-11-23 NOTE — TELEPHONE ENCOUNTER
Per Dr. Avila Tobar if cough persists, is productive or he develops worsening fever or other symptom she would recommend RVP and CXR.   LVM for patient explaining and providing after hours triage line number

## 2022-11-23 NOTE — PROGRESS NOTES
This is a recent snapshot of the patient's [facility] medical record.  For current drug dose and complete information and questions, call [phone] or In Basket pool, [facility] ([id number])  CSN Number:  [id number]

## 2022-11-23 NOTE — NURSING NOTE
"Oncology Rooming Note    November 22, 2022 12:15 PM   Nik Nava is a 62 year old male who presents for:    Chief Complaint   Patient presents with     Port Draw     Vitals taken, port accessed, labs drawn, heparin locked, de-accessed, checked into next appt     Oncology Clinic Visit     Acute Lymphoblastic Leukemia     Initial Vitals: BP (!) 140/78 (BP Location: Left arm, Patient Position: Sitting, Cuff Size: Adult Large)   Pulse 86   Temp 98  F (36.7  C) (Oral)   Resp 16   Wt 117.8 kg (259 lb 9.6 oz)   SpO2 98%   BMI 34.26 kg/m   Estimated body mass index is 34.26 kg/m  as calculated from the following:    Height as of 11/1/22: 1.854 m (6' 0.99\").    Weight as of this encounter: 117.8 kg (259 lb 9.6 oz). Body surface area is 2.46 meters squared.  No Pain (0) Comment: Data Unavailable   No LMP for male patient.  Allergies reviewed: Yes  Medications reviewed: Yes    Medications: Medication refills not needed today.  Pharmacy name entered into RF Code:    [facility] - [place], [place] - [address] [facility] [place], [place] - [address] [place] [address] [id number]  [facility]    Clinical concerns: Pt presents today for f/u.       Deborah Michaels LPN  11-22-22            "

## 2022-11-24 NOTE — TELEPHONE ENCOUNTER
Patient's wife, Lamar, called stating that patient spiked a fever to 101.5. He also has cough but no other symptoms. He had a low grade fever earlier today and she gave him two tylenols this morning. They rechecked him temperature this evening and it was even higher than before. She gave him some more tylenol after this. Reviewing prior notes, it was advised that they obtain an RVP and CXR per Dr. Avila Tobar's recommendations. I relayed this information to them and explained that the only places to get this workup done was an urgent care or the emergency department. Lamar stated that she did not want to go to the ED or urgent care this evening because of the wait times. I encouraged them to get further evaluation considering his recurrent temperature elevations. She said she will see how the night goes.

## 2022-11-28 ENCOUNTER — CARE COORDINATION (OUTPATIENT)
Dept: TRANSPLANT | Facility: CLINIC | Age: 62
End: 2022-11-28

## 2022-11-28 NOTE — PROGRESS NOTES
Spoke with Nik and his wife (Lamar) for update on symptoms related to his influenza A diagnosis.  Per Nik, he is extremely fatigued, has worsening SOB (per patient, has had SOB for about 1 month, but much worse in last few days.  Example patient provided was that he filled bird feeder outside today and felt like he ran a marathon afterwards), and cough (productive at times).  Nik states he has been afebrile for a few days.  Nik also states that chest xray was completed when at the ED on 11/23, and was clear of pneumonia at that time.  Nik also expressed concerns possibly related to his decrease in Prednisone last week.  Per Nik, since prednisone dose decreased last week he has had increased dryness and pain in his eyes, and increased reddened mouth sores.  This RN updated Dr. Avila Tobar with these symptoms, and it was recommended that Nik be seen in our BMT clinic on 11/29/22, and he should return to ED in the meantime with any additional new or worsening symptoms, especially worsening SOB.  Appointment requested, and this RN will update patient with appointment date/time once scheduled.      ADDENDUM: Spoke with Nik and Lamar about Dr. Avila Tobar's request for him to be seen for symptoms tomorrow.  Let them know that appointment request has been entered, but has not been completed yet. Nik will check My Chart for appointment updates this evening, and this RN will update in the morning as well.  Nik understands that if any symptoms worsen in the meantime that he should be seen in the ED right away.

## 2022-11-29 ENCOUNTER — ANCILLARY PROCEDURE (OUTPATIENT)
Dept: GENERAL RADIOLOGY | Facility: CLINIC | Age: 62
End: 2022-11-29
Attending: PHYSICIAN ASSISTANT
Payer: COMMERCIAL

## 2022-11-29 ENCOUNTER — ONCOLOGY VISIT (OUTPATIENT)
Dept: TRANSPLANT | Facility: CLINIC | Age: 62
End: 2022-11-29
Attending: INTERNAL MEDICINE
Payer: COMMERCIAL

## 2022-11-29 VITALS
SYSTOLIC BLOOD PRESSURE: 110 MMHG | DIASTOLIC BLOOD PRESSURE: 80 MMHG | OXYGEN SATURATION: 95 % | HEART RATE: 84 BPM | TEMPERATURE: 98.3 F | WEIGHT: 249.9 LBS | BODY MASS INDEX: 32.98 KG/M2

## 2022-11-29 DIAGNOSIS — Z94.81 STATUS POST BONE MARROW TRANSPLANT (H): ICD-10-CM

## 2022-11-29 DIAGNOSIS — Z94.81 STATUS POST BONE MARROW TRANSPLANT (H): Primary | ICD-10-CM

## 2022-11-29 LAB
ALBUMIN SERPL BCG-MCNC: 4 G/DL (ref 3.5–5.2)
ALP SERPL-CCNC: 113 U/L (ref 40–129)
ALT SERPL W P-5'-P-CCNC: 40 U/L (ref 10–50)
ANION GAP SERPL CALCULATED.3IONS-SCNC: 13 MMOL/L (ref 7–15)
AST SERPL W P-5'-P-CCNC: 41 U/L (ref 10–50)
BASOPHILS # BLD AUTO: 0 10E3/UL (ref 0–0.2)
BASOPHILS NFR BLD AUTO: 0 %
BILIRUB SERPL-MCNC: 1.1 MG/DL
BUN SERPL-MCNC: 35.4 MG/DL (ref 8–23)
CALCIUM SERPL-MCNC: 9.9 MG/DL (ref 8.8–10.2)
CHLORIDE SERPL-SCNC: 95 MMOL/L (ref 98–107)
CREAT SERPL-MCNC: 1.24 MG/DL (ref 0.67–1.17)
DEPRECATED HCO3 PLAS-SCNC: 24 MMOL/L (ref 22–29)
EOSINOPHIL # BLD AUTO: 0 10E3/UL (ref 0–0.7)
EOSINOPHIL NFR BLD AUTO: 0 %
ERYTHROCYTE [DISTWIDTH] IN BLOOD BY AUTOMATED COUNT: 15.8 % (ref 10–15)
GFR SERPL CREATININE-BSD FRML MDRD: 65 ML/MIN/1.73M2
GLUCOSE SERPL-MCNC: 101 MG/DL (ref 70–99)
HCT VFR BLD AUTO: 34.5 % (ref 40–53)
HGB BLD-MCNC: 12 G/DL (ref 13.3–17.7)
IMM GRANULOCYTES # BLD: 0.1 10E3/UL
IMM GRANULOCYTES NFR BLD: 1 %
LYMPHOCYTES # BLD AUTO: 1.9 10E3/UL (ref 0.8–5.3)
LYMPHOCYTES NFR BLD AUTO: 21 %
MCH RBC QN AUTO: 34.8 PG (ref 26.5–33)
MCHC RBC AUTO-ENTMCNC: 34.8 G/DL (ref 31.5–36.5)
MCV RBC AUTO: 100 FL (ref 78–100)
MONOCYTES # BLD AUTO: 0.8 10E3/UL (ref 0–1.3)
MONOCYTES NFR BLD AUTO: 9 %
NEUTROPHILS # BLD AUTO: 6.2 10E3/UL (ref 1.6–8.3)
NEUTROPHILS NFR BLD AUTO: 69 %
NRBC # BLD AUTO: 0 10E3/UL
NRBC BLD AUTO-RTO: 0 /100
PLATELET # BLD AUTO: 94 10E3/UL (ref 150–450)
POTASSIUM SERPL-SCNC: 4.8 MMOL/L (ref 3.4–5.3)
PROT SERPL-MCNC: 6.7 G/DL (ref 6.4–8.3)
RBC # BLD AUTO: 3.45 10E6/UL (ref 4.4–5.9)
SARS-COV-2 RNA RESP QL NAA+PROBE: NEGATIVE
SODIUM SERPL-SCNC: 132 MMOL/L (ref 136–145)
WBC # BLD AUTO: 9.1 10E3/UL (ref 4–11)

## 2022-11-29 PROCEDURE — U0005 INFEC AGEN DETEC AMPLI PROBE: HCPCS

## 2022-11-29 PROCEDURE — 36591 DRAW BLOOD OFF VENOUS DEVICE: CPT

## 2022-11-29 PROCEDURE — 85025 COMPLETE CBC W/AUTO DIFF WBC: CPT

## 2022-11-29 PROCEDURE — 80053 COMPREHEN METABOLIC PANEL: CPT

## 2022-11-29 PROCEDURE — 99215 OFFICE O/P EST HI 40 MIN: CPT | Mod: CS

## 2022-11-29 PROCEDURE — 71046 X-RAY EXAM CHEST 2 VIEWS: CPT | Mod: GC | Performed by: RADIOLOGY

## 2022-11-29 PROCEDURE — G0463 HOSPITAL OUTPT CLINIC VISIT: HCPCS

## 2022-11-29 NOTE — PROGRESS NOTES
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    HPI:    The patient tested positive for Influenza A on 11/23 - he was started Tamiflu and completed this course. He has been afebrile for the last 3 days but called triage about persistent fatigue and shortness of breath so Dr. Avila Tobar recommended he come in for labs and examination. He reports that his shortness of breath and fatigue are not any worse than prior to influenza but he just feels \"defeated\" right now.     He is also complaining of worsening eyes over the last week since decreasing his prednisone last week. He is scheduled to see the ophthalmologist in 2 days. He also notes that his mouth is worse but the s/s dex is managing the pain well. He is just frustrated with the inability to manage these GVH symptoms.        Review of Systems                                                                                                                                         10 point Review of systems was negative except as detailed above     PHYSICAL EXAM                                                                                                                                                 KPS: 80  /80   Pulse 84   Temp 98.3  F (36.8  C)   Wt 113.4 kg (249 lb 14.4 oz)   SpO2 95%   BMI 32.98 kg/m    Blood pressure at home, generally systolic blood pressure less than 145    Wt Readings from Last 4 Encounters:   11/29/22 113.4 kg (249 lb 14.4 oz)   11/22/22 117.8 kg (259 lb 9.6 oz)   11/08/22 114.9 kg (253 lb 3.2 oz)   11/01/22 117.3 kg (258 lb 9.6 oz)      General: NAD.  HEENT: sclera anicteric, injected conjunctivae. No lichenoid changes in oral mucosa, no ulcers seen.    Lungs:  CTA b/l  no wheezes  CV: RRR  no gallop  Abd; soft, NABS, protuberant  Ext/ Skin: Skin bruising on bilateral forearms and previous skin tears have now healed, Bites " also healed    LE 1+ bilateral edema in lower extremities; wearing compression socks-improved  Access: port-a-cath    cGVHD therapy started on February 24 2022  - Baseline NIH scoring 2/24/2022 : skin 2 maculopapular rash/erythema with no sclerotic features, mouth score 1 mild symptoms with lichenoid changes less than 25%, eyes score 2 moderate symptoms without new visual impairments due to KCS requiring lubricant eyedrops more than 3 times a day, overall mild scale for her provider  - 3/25/2022 1 month NIH treatment assessment on PQRST: skin 2, mouth score 1 mild symptoms with NO lichenoid changes, eyes score 2 moderate symptoms w requiring lubricant eyedrops more than 3 times a day, liver score 1 ALT 3.7x ULN and nl bilirubin   - 3/31  Mouth clear; eyes minimal LFT still abn; no joint or new GI sx  - 4/28 Mouth clear, Left>R eye is mild sclera erythema, lids are pink and irritated appearing, LFT's slow improvement, no new joint, skin, or GI symptoms.   -5/11 mouth with mild erythema but no lichenoid changes, eyes using eyedrops 4-6 times a day score of 2, LFTs significantly improved from baseline slight elevation in alk phos and AST with normal bilirubin, skin with hyperpigmentation from old acute GVHD no active skin rashes, no GI symptoms no musculoskeletal complaints.  -5/26 Mouth with very slight lichenoid changes, erythema.  Eyes still using eyedrops 4-6x per day.  LFTs essentially normal with slight elevation in alk phos.  Skin with hyperpigmentation from old acute GVHD, no active rashes, no GI or MSK concerns.  Skin 0, mouth 0 but with lichenoid changes, eyes 2  -6/7/22 Mouth with no lichenoid changes, erythema.  Eyes still using eyedrops 4-6x per day.  LFTs essentially normal with slight elevation in alk phos.  Skin with hyperpigmentation from old acute GVHD, no active rashes, no GI or MSK concerns.  Skin 0, mouth 0, eyes 2     -6 month PQRST assessment 9/6/2022: eyes 3, mouth 0 for symptoms, 25% lichenoid  changes (1), liver/GI/skin 0    11/8/2022 and 11/22/2022 cGVHD NIG scoring eyes 3, no other organ involvement clinically    ASSESSMENT AND PLAN   Nik Nava is a 61 year old male with Ph Pos ALL, day 476 s/p sib allo stem cell transplant    Day -6 (8/4): flu/cy  Day -5 through day -2 (8/5-8/8): flu  Day -1 (8/9): TBI  Day 0 (8/10): transplant     1.  Acute lymphoblastic leukemia, [place] chromosome positive in CR, MRD neg. S/p allo sib PBSCT. (ABO matched)  HCT-CI score: 3 (prior solid tumor)  Day +100 bone marrow biopsy is 100% donor with no morphologic or flow cytometric evidence of leukemia BCR abl is pending.  - Day +180 restaging BM bx- still in CR  100% donor;  2/16/22 BCR ABL major breakpoint undetectable.   - 1 year restaging 8/18/2022: Markedly hypocellular bone marrow [less than 10% cellular] with maturing trilineage hematopoiesis and no definite morphologic or immunophenotypic evidence for involvement by B lymphoblastic leukemia. BCRABL1 PCR not detected, bone marrow % donor. FISH NORMAL No evidence of BCR-ABL1 fusion  - Maintenance with TKI posttransplantation given his Ph positive ALL that have not been started previously presumed secondary to multiple active issues specifically infections and COVID.  Per Avila Tobar: will reconsider this patient will think about whether this is something he would like to consider he was previously on Dasatinib, we would start on a low dose and increase as tolerated.  This is on hold now pending active GVHD therapy, we discussed on this visit 10/18 in agreement with holding off.     2.  HEME:  - Pulmonary emboli: He developed a pulmonary emboli in the setting of receiving PEG asparaginase (ie provoked thrombus).  Xarelto complete.   -At 1 year anniversary visit completed screening for secondary iron overload, 8/2022 ferritin 1023, recheck on 10/18 increased to 2348, expect this is acute phase reactant with recent GVHD flare.    - 11/8 ferritin 2812, MRI  completed 11/2022 confirming iron overload, therefore today 11/22 I discussed with the patient starting phlebotomies as I worry about his tolerance to iron chelators specifically regarding his renal function, will start phlebotomies 250 mL every 4 weeks as tolerated and plan to keep his hemoglobin more than 11-volume can be titrated as well as frequency depending on his tolerance and hemoglobin as well as follow-up ferritin levels.      3.  FEN/Renal:  - Cr improved with switching patient to sirolimus briefly, then with increased Cr on 10/11 with third spacing. Total protein to creatinine ratio 0.41 on 10/11 recheck 0.39 on 10/18. 11/8 creatinine up to 1.4, 11/22 creatinine down to 1.25   - He has a history of renal cancer and resection. Has a single kidney.  - 11/1/2022 seen by nephrology: multifactorial, focus on BP control, Started Chlorthalidone 12.5 mg daily and reduce amlodipine to 2.5 mg per day, Low salt diet and reduce ice tea. Next step: may increase chlorthalidone if Cr stable but if not improving in swelling, may start torsemide Follow-up in 2 month   -Hypomagnesemia,11/8 decrease magnesium to 2 tablets 2 times a day-seems to have triggered more muscle cramps specifically in his right arm cussed with the patient uptitrating that back up for symptom control of his cramps, he continues on vitamin E for muscle cramps as well.     4.    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   #Skin GVHD- history of biopsy proven GVHD of the skin 8/30/2021; resolved.   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 WNL today 11/22  # Ocular GVHD: follows with ophthalmology. Maxitrol to lids, continue refreshing drops QID. Score 3  Followed closely by Dr. Juarez and had ocular fittings 11/8 however no lens seen on exam today he will follow-up with her team for refitting   # Oral GVHD: dex s/s three-4 times a day; tac ointment to lips as needed.   Clotrimazole cream added after seeing dermatology.  Lichenoid changes have resolved with no other ulcers since 11/8     Previous therapies  Tacrolimus-previously decided to stay on same dose, no checks of levels if clinically stable, and no need switch to sirolimus. (keep tac low as gvhd is quiet and viremia). 5/10 level 45 with starting of COVID therapy and D-D intractions (Paxlovid for COVID19, which has a drug interaction with tacrolimus-Ritonavir increases serum levels of tacrolimus).   Tacrolimus level subtherapeutic, increase to 2.5 mg twice daily recently, 8/23/2022 instructed to change tacrolimus to 2 mg twice daily. 9/6 with mild NEGRA, hyperkalemia, hypomagnesemia, and reports some tremors and cramps, suspect tacrolimus to be high therefore empirically decreased to 1.5 twice daily, skip morning dose of tacrolimus and took 2 mg today after his afternoon labs. Decrease to 1mg BID on Saturday.  Sirolimus  9/21 despite fluids and a dose adjustment of tacrolimus patient seem to have persistence NORBERTO related NEGRA, therefore after discussion with the patient decision was made to transition to sirolimus that was started on 9/21, patient initially seem to tolerate this well with normalization of kidney function  10/11 presented with flares of ocular and oral GVHD, fluid retention and gain of 5-6 kg, lower extremity edema, abdominal distention, total protein to creatinine ratio elevated at 0.4, in addition to elevation in BUN and creatinine, HTN.  Picture most consistent with sirolimus related side effects.  Less likely that the patient will tolerate even a lower level of sirolimus.  Therefore the patient was instructed to stop sirolimus, last dose on 10/10. 10/14 level 3.5 appropriately trending down.     Corticosteroids  3/1-3/16: prednisone 100mg every day  3/17 75mg every day   3/31 75 alt with 50  4/6: 75 alt with 25mg every other day  4/12 pred tapered to 60 alternating with 25mg   4/15 In setting of viruses  trying to reactivate,GVHD stable: Taper 60/15mg alternating.  4/20 decrease pred further to 50/10.    4/25 taper pred to 50/0 every other day as long as symptoms stable.   5/2 Pred decrease 40/0 alternating every other day.   5/13 decrease to 30/0  5/20 decrease to 20/0 then slowly by 5 mg weekly  6/7 decrease to 10/0  Went up to 15/0 with mild increased LFTs  8/23/2022 increased to 20/0, with persistence of oral ulcers and active ocular GVHD.  Discussed with the patient the importance of stopping chewing of nicotine gums for prolonged hours as that would not help with the healing of the oral ulcers.  I discussed with the patient alternatives of nicotine patches that he will reconsider and let me know.  9/6 decreased to 15 alternating with 0  9/13 decreased to 10 alternating with 0  9/21 discontinued tacrolimus given persistent NEGRA and single kidney and start the patient on sirolimus without loading dose.  We will get a list of possible side effects and adverse events from the Pharm.D. see sirolimus section above.  10/11 GVHD flare. Sirolimus stopped. Resume prednisone 40 mg daily as of 10/12, no change with mildly worsening eye pain 10/14  Reduce pred to 35mg 10/25 and 25mg 11/1 11/8 20 mg   11/22 15 mg    Belumosudil  Patient intolerant/with disease flares on tacrolimus and sirolimus see above.  Discussed with the patient the different options for therapy of chronic GVHD that are FDA approved.  Given the side effect profiles after discussing in detail and given the least nephrotoxicity and expected response, taking into account other metabolic effect as well.  We will proceed with prior authorization with belumosudil.  Patient was given material to review for possible expected adverse events and side effects with both Belumosodil and ruxolitinib.   --- Started on 10/18 in p.m.     5.  ID: Afebrile     #Influenza A (tested positive on 11/23): s/p Tamiflu now. Afebrile for last 3 days. (11/28) Persistent but  unchanged SOB and fatigue. Dr. Avila Tobar wanted him seen in clinic: CXR is negative for pneumonia. COVID pending. O2 sats are stable - fatigue and SOB have not changed since he started Belumosudil, but he is feeling defeated right now after this influenza infection. Requested follow up next week with Dr. Avila Tobar.    # cat bite/scratch: doxy 100mg BID x 10 days; completes 11/3/22.  Bite has healed, no signs of infection.     #History of COVID   Positive via homed test 5/8. Treated with Paxlovid with resolution of symptoms.  Had recurrence on 5/18. Resolved  Covid 11/21, 12/21, received his influenza annual vaccine as well as COVID boosters locally 11/16     #History of pulmonary infiltrates:   4/20 CT chest with new RUL infiltrate. Highly immunocompromised. 4/22 Fungitell/asp GM were neg. BAL 4/29 NGTD, increased micafungin to 150mg IV daily-- f/u 6/1 Dr Garcia CT with mixed results, followed with Dr. Garcia August 2022 with resolution of pulmonary nodules and normalization of LFTs we will switch patient will switch patient to posaconazole prophylactic dose and monitor LFTs and any infectious concerns closely (wnl stable 10/14)     #History of CMV viremia:    - CMV viremia up to 1100. 4/15 started valcyte 900mg PO BID. 4/20 CMV <137, 5/10 ND, decreased to 450mg BID 4/30 - continue 4 weeks as long as CMV <137 till 5/28 + weekly CMV  - Switch to acyclovir after completion of current therapy 5/28. 10/11 CMV ND. Check monthly on IST, 11/8 CMV <137, 11/22 ND, recheck 12/8     - PPx:   ACV  Posaconazole (previously on micafungin with LFts abl, hx of pulmonary nodules needign treatment dose).   Pentamidine, last 11/22  Azithro 250mg daily (stopped levaquin due to possible tendonitis).      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 S/p 4/22 and 5/4/22 rituximab 375mg/m2 5/10 ND x2, 6/7 ND, 8/18/2022 971, 10/11 ND, check every other month on IST, 11/8 ND  S/p covid vaccine series 12/2021  S/p anuradha   Deferred  annual vaccines in the setting of GVHD and immunosuppression therapy     6. Endo:   - Hx of graves disease; On synthroid 175 mcg daily. TSH normal 4/6/2022 no reflex to T4.  Recheck on 10/18 T3 low at 0.03, free T3 and T4 within normal.  Subclinical hyperthyroidism, 11/22/222 and discussion with endocrine, reduce the levothyroxine to 150 mcg/day and repeat labs in 2-3.  He has follow-up appointment with endocrine    - HLD: 11/2022 cholesterol 355, triglycerides 153, -- 11/8 started rosuvastatin 5 mg daily we will recheck lipid profile in 3 months, 11/22 CPK within normal     7. CV:   - Hypertension on Norvasc: Decreased to 2.5mg, added thiazide as above nephrology section  - TTE most recently 10/2022     8. GI:   -Omeprazole for heartburn  -LFTs as above, now normal. 9/6 decreased ursodiol to daily      9. Psych:   - Situational anxiety - lexapro 10mg daily.   - Insomnia: worse on steroids. Ativan, trazadone, melatonin     10. MSK: left food drop, PT. Occasional muscle cramps discussed optimizing vitamin D levels and considering vitamin D, some of the symptomatology of muscle cramps are likely related to chronic GVHD.     11. HCM/Age appropriate cancer screening:  -Discussed with the patient importance of age-appropriate cancer screening including colonoscopies, he is due for this.  -Discussed importance of at least once a year vitamin D level check, 8/18/2022 wnl  -Screening for secondary iron overload, see heme section above, start vitamins on 12/13  -Yearly TSH 2022 wnl; yearly lipid panel - see GI section above  -9/6/2022 DEXA scan Based on BMD diagnosis is consistent with normal bone density based on WHO criteria Ref. 1   -PFTs 9/20/22, see above  -Metabolic other, 8/2022 testosterone within normal  -11/22/2022 discussed with the patient the challenges and the stresses of chronic illness and healthcare fatigue.  Patient had previously been on Lexapro that we restarted today confirmed with pharmacy that  there are no drug drug interactions.  Additionally we will refer patient to PMNR to help with physical rehab and strength to help with fatigue.  Our social workers will also reach out to Nik and his wife for further resources including support groups for patients with chronic illness and fatigue post transplant.        Plan:   Follow up post influenza A infection following course of Tamiflu: CXR negative (Reports of persistent SOB and O2 sats 94-95%), COVID pending.   Requested appointment with Dr. Avila Tobar next week - patient would like to discuss Belumosudil and whether this is the best option for him still.  Continue s/s dex for mouth    Follow up with ophthalmology on 12/1      I spent 40 minutes in the care of this patient today, which included time necessary for preparation for the visit, obtaining history, ordering medications/tests/procedures as medically indicated, review of pertinent medical literature, counseling of the patient, communication of recommendations to the care team, and documentation time.    Moris Ortiz PA-C  *[phone]

## 2022-11-29 NOTE — NURSING NOTE
"Oncology Rooming Note    November 29, 2022 10:21 AM   Nik Nava is a 62 year old male who presents for:    Chief Complaint   Patient presents with     Oncology Clinic Visit     RTN for ALL     Initial Vitals: /80   Pulse 84   Temp 98.3  F (36.8  C)   Wt 113.4 kg (249 lb 14.4 oz)   SpO2 95%   BMI 32.98 kg/m   Estimated body mass index is 32.98 kg/m  as calculated from the following:    Height as of 11/1/22: 1.854 m (6' 0.99\").    Weight as of this encounter: 113.4 kg (249 lb 14.4 oz). Body surface area is 2.42 meters squared.  Data Unavailable Comment: Data Unavailable   No LMP for male patient.  Allergies reviewed: Yes  Medications reviewed: Yes    Medications: Medication refills not needed today.  Pharmacy name entered into [employer]:    [facility] - [place], [place] - [address] [facility] [place], [place] - [address] [place] [address] [phone]  [facility]    Clinical concerns: pt here after ER visit after flu, still feeling very fatigued.       Melissa Rose, EMT              "

## 2022-11-29 NOTE — LETTER
"    11/29/2022         RE: Nik Nava  [id number] [facility] [id number]        Dear Colleague,    Thank you for referring your patient, Nik Nava, to the [facility] [place]. Please see a copy of my visit note below.        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    HPI:    The patient tested positive for Influenza A on 11/23 - he was started Tamiflu and completed this course. He has been afebrile for the last 3 days but called triage about persistent fatigue and shortness of breath so Dr. Avila Tobar recommended he come in for labs and examination. He reports that his shortness of breath and fatigue are not any worse than prior to influenza but he just feels \"defeated\" right now.     He is also complaining of worsening eyes over the last week since decreasing his prednisone last week. He is scheduled to see the ophthalmologist in 2 days. He also notes that his mouth is worse but the s/s dex is managing the pain well. He is just frustrated with the inability to manage these GVH symptoms.        Review of Systems                                                                                                                                         10 point Review of systems was negative except as detailed above     PHYSICAL EXAM                                                                                                                                                 KPS: 80  /80   Pulse 84   Temp 98.3  F (36.8  C)   Wt 113.4 kg (249 lb 14.4 oz)   SpO2 95%   BMI 32.98 kg/m    Blood pressure at home, generally systolic blood pressure less than 145    Wt Readings from Last 4 Encounters:   11/29/22 113.4 kg (249 lb 14.4 oz)   11/22/22 117.8 kg (259 lb 9.6 oz)   11/08/22 114.9 kg (253 lb 3.2 oz)   11/01/22 117.3 kg (258 lb 9.6 oz)      General: " NAD.  HEENT: sclera anicteric, injected conjunctivae. No lichenoid changes in oral mucosa, no ulcers seen.    Lungs:  CTA b/l  no wheezes  CV: RRR  no gallop  Abd; soft, NABS, protuberant  Ext/ Skin: Skin bruising on bilateral forearms and previous skin tears have now healed, Bites also healed    LE 1+ bilateral edema in lower extremities; wearing compression socks-improved  Access: port-a-cath    cGVHD therapy started on February 24 2022  - Baseline NIH scoring 2/24/2022 : skin 2 maculopapular rash/erythema with no sclerotic features, mouth score 1 mild symptoms with lichenoid changes less than 25%, eyes score 2 moderate symptoms without new visual impairments due to KCS requiring lubricant eyedrops more than 3 times a day, overall mild scale for her provider  - 3/25/2022 1 month NIH treatment assessment on PQRST: skin 2, mouth score 1 mild symptoms with NO lichenoid changes, eyes score 2 moderate symptoms w requiring lubricant eyedrops more than 3 times a day, liver score 1 ALT 3.7x ULN and nl bilirubin   - 3/31  Mouth clear; eyes minimal LFT still abn; no joint or new GI sx  - 4/28 Mouth clear, Left>R eye is mild sclera erythema, lids are pink and irritated appearing, LFT's slow improvement, no new joint, skin, or GI symptoms.   -5/11 mouth with mild erythema but no lichenoid changes, eyes using eyedrops 4-6 times a day score of 2, LFTs significantly improved from baseline slight elevation in alk phos and AST with normal bilirubin, skin with hyperpigmentation from old acute GVHD no active skin rashes, no GI symptoms no musculoskeletal complaints.  -5/26 Mouth with very slight lichenoid changes, erythema.  Eyes still using eyedrops 4-6x per day.  LFTs essentially normal with slight elevation in alk phos.  Skin with hyperpigmentation from old acute GVHD, no active rashes, no GI or MSK concerns.  Skin 0, mouth 0 but with lichenoid changes, eyes 2  -6/7/22 Mouth with no lichenoid changes, erythema.  Eyes still using  eyedrops 4-6x per day.  LFTs essentially normal with slight elevation in alk phos.  Skin with hyperpigmentation from old acute GVHD, no active rashes, no GI or MSK concerns.  Skin 0, mouth 0, eyes 2     -6 month PQRST assessment 9/6/2022: eyes 3, mouth 0 for symptoms, 25% lichenoid changes (1), liver/GI/skin 0    11/8/2022 and 11/22/2022 cGVHD NIG scoring eyes 3, no other organ involvement clinically    ASSESSMENT AND PLAN   Nik Nava is a 61 year old male with Ph Pos ALL, day 476 s/p sib allo stem cell transplant    Day -6 (8/4): flu/cy  Day -5 through day -2 (8/5-8/8): flu  Day -1 (8/9): TBI  Day 0 (8/10): transplant     1.  Acute lymphoblastic leukemia, [place] chromosome positive in CR, MRD neg. S/p allo sib PBSCT. (ABO matched)  HCT-CI score: 3 (prior solid tumor)  Day +100 bone marrow biopsy is 100% donor with no morphologic or flow cytometric evidence of leukemia BCR abl is pending.  - Day +180 restaging BM bx- still in CR  100% donor;  2/16/22 BCR ABL major breakpoint undetectable.   - 1 year restaging 8/18/2022: Markedly hypocellular bone marrow [less than 10% cellular] with maturing trilineage hematopoiesis and no definite morphologic or immunophenotypic evidence for involvement by B lymphoblastic leukemia. BCRABL1 PCR not detected, bone marrow % donor. FISH NORMAL No evidence of BCR-ABL1 fusion  - Maintenance with TKI posttransplantation given his Ph positive ALL that have not been started previously presumed secondary to multiple active issues specifically infections and COVID.  Per Avila Tobar: will reconsider this patient will think about whether this is something he would like to consider he was previously on Dasatinib, we would start on a low dose and increase as tolerated.  This is on hold now pending active GVHD therapy, we discussed on this visit 10/18 in agreement with holding off.     2.  HEME:  - Pulmonary emboli: He developed a pulmonary emboli in the setting of receiving PEG  asparaginase (ie provoked thrombus).  Xarelto complete.   -At 1 year anniversary visit completed screening for secondary iron overload, 8/2022 ferritin 1023, recheck on 10/18 increased to 2348, expect this is acute phase reactant with recent GVHD flare.    - 11/8 ferritin 2812, MRI completed 11/2022 confirming iron overload, therefore today 11/22 I discussed with the patient starting phlebotomies as I worry about his tolerance to iron chelators specifically regarding his renal function, will start phlebotomies 250 mL every 4 weeks as tolerated and plan to keep his hemoglobin more than 11-volume can be titrated as well as frequency depending on his tolerance and hemoglobin as well as follow-up ferritin levels.      3.  FEN/Renal:  - Cr improved with switching patient to sirolimus briefly, then with increased Cr on 10/11 with third spacing. Total protein to creatinine ratio 0.41 on 10/11 recheck 0.39 on 10/18. 11/8 creatinine up to 1.4, 11/22 creatinine down to 1.25   - He has a history of renal cancer and resection. Has a single kidney.  - 11/1/2022 seen by nephrology: multifactorial, focus on BP control, Started Chlorthalidone 12.5 mg daily and reduce amlodipine to 2.5 mg per day, Low salt diet and reduce ice tea. Next step: may increase chlorthalidone if Cr stable but if not improving in swelling, may start torsemide Follow-up in 2 month   -Hypomagnesemia,11/8 decrease magnesium to 2 tablets 2 times a day-seems to have triggered more muscle cramps specifically in his right arm cussed with the patient uptitrating that back up for symptom control of his cramps, he continues on vitamin E for muscle cramps as well.     4.    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   #Skin GVHD- history of biopsy proven GVHD of the skin 8/30/2021; resolved.   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 WNL today 11/22  # Ocular GVHD: follows with ophthalmology.  Maxitrol to lids, continue refreshing drops QID. Score 3  Followed closely by Dr. Juarez and had ocular fittings 11/8 however no lens seen on exam today he will follow-up with her team for refitting   # Oral GVHD: dex s/s three-4 times a day; tac ointment to lips as needed.  Clotrimazole cream added after seeing dermatology.  Lichenoid changes have resolved with no other ulcers since 11/8     Previous therapies  Tacrolimus-previously decided to stay on same dose, no checks of levels if clinically stable, and no need switch to sirolimus. (keep tac low as gvhd is quiet and viremia). 5/10 level 45 with starting of COVID therapy and D-D intractions (Paxlovid for COVID19, which has a drug interaction with tacrolimus-Ritonavir increases serum levels of tacrolimus).   Tacrolimus level subtherapeutic, increase to 2.5 mg twice daily recently, 8/23/2022 instructed to change tacrolimus to 2 mg twice daily. 9/6 with mild NEGRA, hyperkalemia, hypomagnesemia, and reports some tremors and cramps, suspect tacrolimus to be high therefore empirically decreased to 1.5 twice daily, skip morning dose of tacrolimus and took 2 mg today after his afternoon labs. Decrease to 1mg BID on Saturday.  Sirolimus  9/21 despite fluids and a dose adjustment of tacrolimus patient seem to have persistence NORBERTO related NEGRA, therefore after discussion with the patient decision was made to transition to sirolimus that was started on 9/21, patient initially seem to tolerate this well with normalization of kidney function  10/11 presented with flares of ocular and oral GVHD, fluid retention and gain of 5-6 kg, lower extremity edema, abdominal distention, total protein to creatinine ratio elevated at 0.4, in addition to elevation in BUN and creatinine, HTN.  Picture most consistent with sirolimus related side effects.  Less likely that the patient will tolerate even a lower level of sirolimus.  Therefore the patient was instructed to stop sirolimus, last  dose on 10/10. 10/14 level 3.5 appropriately trending down.     Corticosteroids  3/1-3/16: prednisone 100mg every day  3/17 75mg every day   3/31 75 alt with 50  4/6: 75 alt with 25mg every other day  4/12 pred tapered to 60 alternating with 25mg   4/15 In setting of viruses trying to reactivate,GVHD stable: Taper 60/15mg alternating.  4/20 decrease pred further to 50/10.    4/25 taper pred to 50/0 every other day as long as symptoms stable.   5/2 Pred decrease 40/0 alternating every other day.   5/13 decrease to 30/0  5/20 decrease to 20/0 then slowly by 5 mg weekly  6/7 decrease to 10/0  Went up to 15/0 with mild increased LFTs  8/23/2022 increased to 20/0, with persistence of oral ulcers and active ocular GVHD.  Discussed with the patient the importance of stopping chewing of nicotine gums for prolonged hours as that would not help with the healing of the oral ulcers.  I discussed with the patient alternatives of nicotine patches that he will reconsider and let me know.  9/6 decreased to 15 alternating with 0  9/13 decreased to 10 alternating with 0  9/21 discontinued tacrolimus given persistent NEGRA and single kidney and start the patient on sirolimus without loading dose.  We will get a list of possible side effects and adverse events from the Pharm.D. see sirolimus section above.  10/11 GVHD flare. Sirolimus stopped. Resume prednisone 40 mg daily as of 10/12, no change with mildly worsening eye pain 10/14  Reduce pred to 35mg 10/25 and 25mg 11/1 11/8 20 mg   11/22 15 mg    Belumosudil  Patient intolerant/with disease flares on tacrolimus and sirolimus see above.  Discussed with the patient the different options for therapy of chronic GVHD that are FDA approved.  Given the side effect profiles after discussing in detail and given the least nephrotoxicity and expected response, taking into account other metabolic effect as well.  We will proceed with prior authorization with belumosudil.  Patient was given  material to review for possible expected adverse events and side effects with both Belumosodil and ruxolitinib.   --- Started on 10/18 in p.m.     5.  ID: Afebrile     #Influenza A (tested positive on 11/23): s/p Tamiflu now. Afebrile for last 3 days. (11/28) Persistent but unchanged SOB and fatigue. Dr. Avila Tobar wanted him seen in clinic: CXR is negative for pneumonia. COVID pending. O2 sats are stable - fatigue and SOB have not changed since he started Belumosudil, but he is feeling defeated right now after this influenza infection. Requested follow up next week with Dr. Avila Tobar.    # cat bite/scratch: doxy 100mg BID x 10 days; completes 11/3/22.  Bite has healed, no signs of infection.     #History of COVID   Positive via homed test 5/8. Treated with Paxlovid with resolution of symptoms.  Had recurrence on 5/18. Resolved  Covid 11/21, 12/21, received his influenza annual vaccine as well as COVID boosters locally 11/16     #History of pulmonary infiltrates:   4/20 CT chest with new RUL infiltrate. Highly immunocompromised. 4/22 Fungitell/asp GM were neg. BAL 4/29 NGTD, increased micafungin to 150mg IV daily-- f/u 6/1 Dr Garcia CT with mixed results, followed with Dr. Garcia August 2022 with resolution of pulmonary nodules and normalization of LFTs we will switch patient will switch patient to posaconazole prophylactic dose and monitor LFTs and any infectious concerns closely (wnl stable 10/14)     #History of CMV viremia:    - CMV viremia up to 1100. 4/15 started valcyte 900mg PO BID. 4/20 CMV <137, 5/10 ND, decreased to 450mg BID 4/30 - continue 4 weeks as long as CMV <137 till 5/28 + weekly CMV  - Switch to acyclovir after completion of current therapy 5/28. 10/11 CMV ND. Check monthly on IST, 11/8 CMV <137, 11/22 ND, recheck 12/8     - PPx:   ACV  Posaconazole (previously on micafungin with LFts abl, hx of pulmonary nodules needign treatment dose).   Pentamidine, last 11/22  Azithro 250mg daily (stopped levaquin  due to possible tendonitis).      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 S/p 4/22 and 5/4/22 rituximab 375mg/m2 5/10 ND x2, 6/7 ND, 8/18/2022 971, 10/11 ND, check every other month on IST, 11/8 ND  S/p covid vaccine series 12/2021  S/p shyamusheld   Deferred annual vaccines in the setting of GVHD and immunosuppression therapy     6. Endo:   - Hx of graves disease; On synthroid 175 mcg daily. TSH normal 4/6/2022 no reflex to T4.  Recheck on 10/18 T3 low at 0.03, free T3 and T4 within normal.  Subclinical hyperthyroidism, 11/22/222 and discussion with endocrine, reduce the levothyroxine to 150 mcg/day and repeat labs in 2-3.  He has follow-up appointment with endocrine    - HLD: 11/2022 cholesterol 355, triglycerides 153, -- 11/8 started rosuvastatin 5 mg daily we will recheck lipid profile in 3 months, 11/22 CPK within normal     7. CV:   - Hypertension on Norvasc: Decreased to 2.5mg, added thiazide as above nephrology section  - TTE most recently 10/2022     8. GI:   -Omeprazole for heartburn  -LFTs as above, now normal. 9/6 decreased ursodiol to daily      9. Psych:   - Situational anxiety - lexapro 10mg daily.   - Insomnia: worse on steroids. Ativan, trazadone, melatonin     10. MSK: left food drop, PT. Occasional muscle cramps discussed optimizing vitamin D levels and considering vitamin D, some of the symptomatology of muscle cramps are likely related to chronic GVHD.     11. HCM/Age appropriate cancer screening:  -Discussed with the patient importance of age-appropriate cancer screening including colonoscopies, he is due for this.  -Discussed importance of at least once a year vitamin D level check, 8/18/2022 wnl  -Screening for secondary iron overload, see heme section above, start vitamins on 12/13  -Yearly TSH 2022 wnl; yearly lipid panel - see GI section above  -9/6/2022 DEXA scan Based on BMD diagnosis is consistent with normal bone density based on WHO criteria Ref. 1   -PFTs  9/20/22, see above  -Metabolic other, 8/2022 testosterone within normal  -11/22/2022 discussed with the patient the challenges and the stresses of chronic illness and healthcare fatigue.  Patient had previously been on Lexapro that we restarted today confirmed with pharmacy that there are no drug drug interactions.  Additionally we will refer patient to PMNR to help with physical rehab and strength to help with fatigue.  Our social workers will also reach out to Nik and his wife for further resources including support groups for patients with chronic illness and fatigue post transplant.        Plan:   Follow up post influenza A infection following course of Tamiflu: CXR negative (Reports of persistent SOB and O2 sats 94-95%), COVID pending.   Requested appointment with Dr. Avila Tobar next week - patient would like to discuss Belumosudil and whether this is the best option for him still.  Continue s/s dex for mouth    Follow up with ophthalmology on 12/1      I spent 40 minutes in the care of this patient today, which included time necessary for preparation for the visit, obtaining history, ordering medications/tests/procedures as medically indicated, review of pertinent medical literature, counseling of the patient, communication of recommendations to the care team, and documentation time.    Moris Ortiz PA-C  *[phone]          Again, thank you for allowing me to participate in the care of your patient.      Sincerely,    No name on file

## 2022-12-01 ENCOUNTER — OFFICE VISIT (OUTPATIENT)
Dept: OPHTHALMOLOGY | Facility: CLINIC | Age: 62
End: 2022-12-01
Payer: COMMERCIAL

## 2022-12-01 ENCOUNTER — HOSPITAL ENCOUNTER (INPATIENT)
Facility: CLINIC | Age: 62
LOS: 2 days | Discharge: HOME | End: 2022-12-03
Attending: EMERGENCY MEDICINE | Admitting: STUDENT IN AN ORGANIZED HEALTH CARE EDUCATION/TRAINING PROGRAM
Payer: COMMERCIAL

## 2022-12-01 ENCOUNTER — CARE COORDINATION (OUTPATIENT)
Dept: TRANSPLANT | Facility: CLINIC | Age: 62
End: 2022-12-01

## 2022-12-01 ENCOUNTER — TELEPHONE (OUTPATIENT)
Dept: TRANSPLANT | Facility: CLINIC | Age: 62
End: 2022-12-01

## 2022-12-01 ENCOUNTER — APPOINTMENT (OUTPATIENT)
Dept: CT IMAGING | Facility: CLINIC | Age: 62
End: 2022-12-01
Attending: EMERGENCY MEDICINE
Payer: COMMERCIAL

## 2022-12-01 DIAGNOSIS — R60.0 BILATERAL LOWER EXTREMITY EDEMA: ICD-10-CM

## 2022-12-01 DIAGNOSIS — C91.01 ACUTE LYMPHOBLASTIC LEUKEMIA (ALL) IN REMISSION (H): ICD-10-CM

## 2022-12-01 DIAGNOSIS — J10.1 INFLUENZA A: ICD-10-CM

## 2022-12-01 DIAGNOSIS — T86.09 GVHD AS COMPLICATION OF BONE MARROW TRANSPLANT (H): ICD-10-CM

## 2022-12-01 DIAGNOSIS — D89.813 GVHD AS COMPLICATION OF BONE MARROW TRANSPLANT (H): Primary | ICD-10-CM

## 2022-12-01 DIAGNOSIS — R91.8 PULMONARY NODULES: ICD-10-CM

## 2022-12-01 DIAGNOSIS — Z11.52 ENCOUNTER FOR SCREENING LABORATORY TESTING FOR SEVERE ACUTE RESPIRATORY SYNDROME CORONAVIRUS 2 (SARS-COV-2): ICD-10-CM

## 2022-12-01 DIAGNOSIS — R06.02 SHORTNESS OF BREATH: ICD-10-CM

## 2022-12-01 DIAGNOSIS — H04.129 DRY EYE: ICD-10-CM

## 2022-12-01 DIAGNOSIS — T86.09 GVHD AS COMPLICATION OF BONE MARROW TRANSPLANT (H): Primary | ICD-10-CM

## 2022-12-01 DIAGNOSIS — D89.813 GVHD AS COMPLICATION OF BONE MARROW TRANSPLANT (H): ICD-10-CM

## 2022-12-01 DIAGNOSIS — T86.09 OTHER COMPLICATION OF BONE MARROW TRANSPLANT (H): ICD-10-CM

## 2022-12-01 DIAGNOSIS — H16.123 FILAMENTARY KERATITIS OF BOTH EYES: Primary | ICD-10-CM

## 2022-12-01 LAB
ALBUMIN SERPL BCG-MCNC: 4 G/DL (ref 3.5–5.2)
ALP SERPL-CCNC: 124 U/L (ref 40–129)
ALT SERPL W P-5'-P-CCNC: 39 U/L (ref 10–50)
ANION GAP SERPL CALCULATED.3IONS-SCNC: 11 MMOL/L (ref 7–15)
APTT PPP: 22 SECONDS (ref 22–38)
AST SERPL W P-5'-P-CCNC: 37 U/L (ref 10–50)
ATRIAL RATE - MUSE: 78 BPM
BASOPHILS # BLD AUTO: 0 10E3/UL (ref 0–0.2)
BASOPHILS NFR BLD AUTO: 0 %
BILIRUB SERPL-MCNC: 1.1 MG/DL
BUN SERPL-MCNC: 39.2 MG/DL (ref 8–23)
CA-I BLD-MCNC: 5 MG/DL (ref 4.4–5.2)
CALCIUM SERPL-MCNC: 9.6 MG/DL (ref 8.8–10.2)
CHLORIDE SERPL-SCNC: 97 MMOL/L (ref 98–107)
CPB POCT: NO
CREAT SERPL-MCNC: 1.24 MG/DL (ref 0.67–1.17)
DEPRECATED HCO3 PLAS-SCNC: 23 MMOL/L (ref 22–29)
DIASTOLIC BLOOD PRESSURE - MUSE: NORMAL MMHG
EOSINOPHIL # BLD AUTO: 0 10E3/UL (ref 0–0.7)
EOSINOPHIL NFR BLD AUTO: 0 %
ERYTHROCYTE [DISTWIDTH] IN BLOOD BY AUTOMATED COUNT: 16 % (ref 10–15)
FLUAV RNA SPEC QL NAA+PROBE: POSITIVE
FLUBV RNA RESP QL NAA+PROBE: NEGATIVE
GFR SERPL CREATININE-BSD FRML MDRD: 65 ML/MIN/1.73M2
GLUCOSE BLD-MCNC: 112 MG/DL (ref 70–99)
GLUCOSE SERPL-MCNC: 111 MG/DL (ref 70–99)
HCO3 BLDV-SCNC: 25 MMOL/L (ref 21–28)
HCT VFR BLD AUTO: 35 % (ref 40–53)
HCT VFR BLD CALC: 34 % (ref 40–53)
HGB BLD-MCNC: 11.6 G/DL (ref 13.3–17.7)
HGB BLD-MCNC: 12.1 G/DL (ref 13.3–17.7)
HOLD SPECIMEN: NORMAL
IMM GRANULOCYTES # BLD: 0.1 10E3/UL
IMM GRANULOCYTES NFR BLD: 1 %
INR PPP: 0.86 (ref 0.85–1.15)
INR PPP: 0.9 (ref 0.85–1.15)
INTERPRETATION ECG - MUSE: NORMAL
LYMPHOCYTES # BLD AUTO: 3.1 10E3/UL (ref 0.8–5.3)
LYMPHOCYTES NFR BLD AUTO: 31 %
MAGNESIUM SERPL-MCNC: 2.1 MG/DL (ref 1.7–2.3)
MCH RBC QN AUTO: 34.9 PG (ref 26.5–33)
MCHC RBC AUTO-ENTMCNC: 34.6 G/DL (ref 31.5–36.5)
MCV RBC AUTO: 101 FL (ref 78–100)
MONOCYTES # BLD AUTO: 0.9 10E3/UL (ref 0–1.3)
MONOCYTES NFR BLD AUTO: 9 %
NEUTROPHILS # BLD AUTO: 5.8 10E3/UL (ref 1.6–8.3)
NEUTROPHILS NFR BLD AUTO: 59 %
NRBC # BLD AUTO: 0 10E3/UL
NRBC BLD AUTO-RTO: 0 /100
NT-PROBNP SERPL-MCNC: 33 PG/ML (ref 0–900)
P AXIS - MUSE: 72 DEGREES
PCO2 BLDV: 38 MM HG (ref 40–50)
PH BLDV: 7.42 [PH] (ref 7.32–7.43)
PHOSPHATE SERPL-MCNC: 3.1 MG/DL (ref 2.5–4.5)
PLATELET # BLD AUTO: 101 10E3/UL (ref 150–450)
PO2 BLDV: 26 MM HG (ref 25–47)
POTASSIUM BLD-SCNC: 4.8 MMOL/L (ref 3.4–5.3)
POTASSIUM SERPL-SCNC: 4.9 MMOL/L (ref 3.4–5.3)
PR INTERVAL - MUSE: 166 MS
PROCALCITONIN SERPL IA-MCNC: 0.18 NG/ML
PROT SERPL-MCNC: 6.6 G/DL (ref 6.4–8.3)
QRS DURATION - MUSE: 88 MS
QT - MUSE: 382 MS
QTC - MUSE: 435 MS
R AXIS - MUSE: 29 DEGREES
RBC # BLD AUTO: 3.47 10E6/UL (ref 4.4–5.9)
RSV RNA SPEC NAA+PROBE: NEGATIVE
SAO2 % BLDV: 49 % (ref 94–100)
SARS-COV-2 RNA RESP QL NAA+PROBE: NEGATIVE
SODIUM BLD-SCNC: 130 MMOL/L (ref 133–144)
SODIUM SERPL-SCNC: 131 MMOL/L (ref 136–145)
SYSTOLIC BLOOD PRESSURE - MUSE: NORMAL MMHG
T AXIS - MUSE: 87 DEGREES
TROPONIN T SERPL HS-MCNC: 21 NG/L
URATE SERPL-MCNC: 4.9 MG/DL (ref 3.4–7)
VENTRICULAR RATE- MUSE: 78 BPM
WBC # BLD AUTO: 9.9 10E3/UL (ref 4–11)

## 2022-12-01 PROCEDURE — 99285 EMERGENCY DEPT VISIT HI MDM: CPT | Mod: 25

## 2022-12-01 PROCEDURE — 258N000003 HC RX IP 258 OP 636: Performed by: EMERGENCY MEDICINE

## 2022-12-01 PROCEDURE — 99213 OFFICE O/P EST LOW 20 MIN: CPT | Performed by: OPTOMETRIST

## 2022-12-01 PROCEDURE — 36591 DRAW BLOOD OFF VENOUS DEVICE: CPT | Performed by: STUDENT IN AN ORGANIZED HEALTH CARE EDUCATION/TRAINING PROGRAM

## 2022-12-01 PROCEDURE — 82947 ASSAY GLUCOSE BLOOD QUANT: CPT

## 2022-12-01 PROCEDURE — 93005 ELECTROCARDIOGRAM TRACING: CPT

## 2022-12-01 PROCEDURE — 87449 NOS EACH ORGANISM AG IA: CPT | Performed by: STUDENT IN AN ORGANIZED HEALTH CARE EDUCATION/TRAINING PROGRAM

## 2022-12-01 PROCEDURE — 120N000002 HC R&B MED SURG/OB UMMC

## 2022-12-01 PROCEDURE — 85730 THROMBOPLASTIN TIME PARTIAL: CPT | Performed by: STUDENT IN AN ORGANIZED HEALTH CARE EDUCATION/TRAINING PROGRAM

## 2022-12-01 PROCEDURE — 85610 PROTHROMBIN TIME: CPT | Performed by: EMERGENCY MEDICINE

## 2022-12-01 PROCEDURE — 250N000009 HC RX 250: Performed by: EMERGENCY MEDICINE

## 2022-12-01 PROCEDURE — 84100 ASSAY OF PHOSPHORUS: CPT | Performed by: STUDENT IN AN ORGANIZED HEALTH CARE EDUCATION/TRAINING PROGRAM

## 2022-12-01 PROCEDURE — 83880 ASSAY OF NATRIURETIC PEPTIDE: CPT | Performed by: EMERGENCY MEDICINE

## 2022-12-01 PROCEDURE — 99285 EMERGENCY DEPT VISIT HI MDM: CPT | Mod: CS | Performed by: EMERGENCY MEDICINE

## 2022-12-01 PROCEDURE — 250N000011 HC RX IP 250 OP 636: Performed by: EMERGENCY MEDICINE

## 2022-12-01 PROCEDURE — 71275 CT ANGIOGRAPHY CHEST: CPT | Mod: 26 | Performed by: RADIOLOGY

## 2022-12-01 PROCEDURE — 87637 SARSCOV2&INF A&B&RSV AMP PRB: CPT | Performed by: EMERGENCY MEDICINE

## 2022-12-01 PROCEDURE — 71275 CT ANGIOGRAPHY CHEST: CPT

## 2022-12-01 PROCEDURE — C9803 HOPD COVID-19 SPEC COLLECT: HCPCS

## 2022-12-01 PROCEDURE — 84443 ASSAY THYROID STIM HORMONE: CPT | Performed by: STUDENT IN AN ORGANIZED HEALTH CARE EDUCATION/TRAINING PROGRAM

## 2022-12-01 PROCEDURE — 87449 NOS EACH ORGANISM AG IA: CPT | Performed by: PHYSICIAN ASSISTANT

## 2022-12-01 PROCEDURE — 99223 1ST HOSP IP/OBS HIGH 75: CPT | Mod: AI | Performed by: STUDENT IN AN ORGANIZED HEALTH CARE EDUCATION/TRAINING PROGRAM

## 2022-12-01 PROCEDURE — 36415 COLL VENOUS BLD VENIPUNCTURE: CPT | Performed by: EMERGENCY MEDICINE

## 2022-12-01 PROCEDURE — 85610 PROTHROMBIN TIME: CPT | Performed by: STUDENT IN AN ORGANIZED HEALTH CARE EDUCATION/TRAINING PROGRAM

## 2022-12-01 PROCEDURE — 84439 ASSAY OF FREE THYROXINE: CPT | Performed by: STUDENT IN AN ORGANIZED HEALTH CARE EDUCATION/TRAINING PROGRAM

## 2022-12-01 PROCEDURE — 85018 HEMOGLOBIN: CPT | Performed by: EMERGENCY MEDICINE

## 2022-12-01 PROCEDURE — 83735 ASSAY OF MAGNESIUM: CPT | Performed by: STUDENT IN AN ORGANIZED HEALTH CARE EDUCATION/TRAINING PROGRAM

## 2022-12-01 PROCEDURE — 82330 ASSAY OF CALCIUM: CPT

## 2022-12-01 PROCEDURE — 84550 ASSAY OF BLOOD/URIC ACID: CPT | Performed by: STUDENT IN AN ORGANIZED HEALTH CARE EDUCATION/TRAINING PROGRAM

## 2022-12-01 PROCEDURE — 84484 ASSAY OF TROPONIN QUANT: CPT | Performed by: EMERGENCY MEDICINE

## 2022-12-01 PROCEDURE — 82947 ASSAY GLUCOSE BLOOD QUANT: CPT | Performed by: EMERGENCY MEDICINE

## 2022-12-01 PROCEDURE — 84145 PROCALCITONIN (PCT): CPT | Performed by: STUDENT IN AN ORGANIZED HEALTH CARE EDUCATION/TRAINING PROGRAM

## 2022-12-01 RX ORDER — SODIUM CHLORIDE FOR INHALATION 0.9 %
3 VIAL, NEBULIZER (ML) INHALATION 2 TIMES DAILY
Qty: 300 ML | Refills: 11 | Status: SHIPPED | OUTPATIENT
Start: 2022-12-01 | End: 2023-06-28

## 2022-12-01 RX ORDER — POSACONAZOLE 100 MG/1
300 TABLET, DELAYED RELEASE ORAL EVERY MORNING
Status: DISCONTINUED | OUTPATIENT
Start: 2022-12-02 | End: 2022-12-03 | Stop reason: HOSPADM

## 2022-12-01 RX ORDER — CEFEPIME HYDROCHLORIDE 1 G/1
1 INJECTION, POWDER, FOR SOLUTION INTRAMUSCULAR; INTRAVENOUS ONCE
Status: COMPLETED | OUTPATIENT
Start: 2022-12-01 | End: 2022-12-01

## 2022-12-01 RX ORDER — DEXAMETHASONE 0.5 MG/5ML
2 SOLUTION ORAL 4 TIMES DAILY
Status: DISCONTINUED | OUTPATIENT
Start: 2022-12-01 | End: 2022-12-03 | Stop reason: HOSPADM

## 2022-12-01 RX ORDER — IOPAMIDOL 755 MG/ML
80 INJECTION, SOLUTION INTRAVASCULAR ONCE
Status: COMPLETED | OUTPATIENT
Start: 2022-12-01 | End: 2022-12-01

## 2022-12-01 RX ORDER — SENNOSIDES 8.6 MG
1 TABLET ORAL 2 TIMES DAILY PRN
Status: DISCONTINUED | OUTPATIENT
Start: 2022-12-01 | End: 2022-12-03 | Stop reason: HOSPADM

## 2022-12-01 RX ORDER — ESCITALOPRAM OXALATE 10 MG/1
10 TABLET ORAL DAILY
Status: DISCONTINUED | OUTPATIENT
Start: 2022-12-02 | End: 2022-12-03 | Stop reason: HOSPADM

## 2022-12-01 RX ORDER — MAGNESIUM OXIDE 400 MG/1
800 TABLET ORAL 2 TIMES DAILY
Status: DISCONTINUED | OUTPATIENT
Start: 2022-12-01 | End: 2022-12-03 | Stop reason: HOSPADM

## 2022-12-01 RX ORDER — PANTOPRAZOLE SODIUM 40 MG/1
40 TABLET, DELAYED RELEASE ORAL 2 TIMES DAILY
Status: DISCONTINUED | OUTPATIENT
Start: 2022-12-02 | End: 2022-12-03 | Stop reason: HOSPADM

## 2022-12-01 RX ORDER — FLUOROMETHOLONE 0.1 %
1 SUSPENSION, DROPS(FINAL DOSAGE FORM)(ML) OPHTHALMIC (EYE) 2 TIMES DAILY
Status: DISCONTINUED | OUTPATIENT
Start: 2022-12-01 | End: 2022-12-03 | Stop reason: HOSPADM

## 2022-12-01 RX ORDER — TRAZODONE HYDROCHLORIDE 50 MG/1
50 TABLET, FILM COATED ORAL AT BEDTIME
Status: DISCONTINUED | OUTPATIENT
Start: 2022-12-01 | End: 2022-12-03 | Stop reason: HOSPADM

## 2022-12-01 RX ORDER — CLOBETASOL PROPIONATE 0.5 MG/G
OINTMENT TOPICAL 2 TIMES DAILY PRN
Status: DISCONTINUED | OUTPATIENT
Start: 2022-12-01 | End: 2022-12-03 | Stop reason: HOSPADM

## 2022-12-01 RX ORDER — LORAZEPAM 0.5 MG/1
1 TABLET ORAL
Status: DISCONTINUED | OUTPATIENT
Start: 2022-12-01 | End: 2022-12-03 | Stop reason: HOSPADM

## 2022-12-01 RX ORDER — ACYCLOVIR 800 MG/1
800 TABLET ORAL 2 TIMES DAILY
Status: DISCONTINUED | OUTPATIENT
Start: 2022-12-01 | End: 2022-12-03 | Stop reason: HOSPADM

## 2022-12-01 RX ORDER — PROCHLORPERAZINE MALEATE 5 MG
5-10 TABLET ORAL EVERY 6 HOURS PRN
Status: DISCONTINUED | OUTPATIENT
Start: 2022-12-01 | End: 2022-12-03 | Stop reason: HOSPADM

## 2022-12-01 RX ORDER — FLUOROMETHOLONE 0.1 %
1 SUSPENSION, DROPS(FINAL DOSAGE FORM)(ML) OPHTHALMIC (EYE) 2 TIMES DAILY
Qty: 5 ML | Refills: 4 | Status: SHIPPED | OUTPATIENT
Start: 2022-12-01 | End: 2023-03-16

## 2022-12-01 RX ORDER — ACETAMINOPHEN 325 MG/1
325-650 TABLET ORAL EVERY 4 HOURS PRN
Status: DISCONTINUED | OUTPATIENT
Start: 2022-12-01 | End: 2022-12-03 | Stop reason: HOSPADM

## 2022-12-01 RX ORDER — CARBOXYMETHYLCELLULOSE SODIUM 5 MG/ML
1 SOLUTION/ DROPS OPHTHALMIC 4 TIMES DAILY
Status: DISCONTINUED | OUTPATIENT
Start: 2022-12-01 | End: 2022-12-03 | Stop reason: HOSPADM

## 2022-12-01 RX ORDER — AZITHROMYCIN 250 MG/1
500 TABLET, FILM COATED ORAL DAILY
Status: DISCONTINUED | OUTPATIENT
Start: 2022-12-02 | End: 2022-12-02

## 2022-12-01 RX ORDER — LEVOTHYROXINE SODIUM 150 UG/1
150 TABLET ORAL DAILY
Status: DISCONTINUED | OUTPATIENT
Start: 2022-12-02 | End: 2022-12-03 | Stop reason: HOSPADM

## 2022-12-01 RX ORDER — ERYTHROMYCIN 5 MG/G
0.5 OINTMENT OPHTHALMIC AT BEDTIME
Status: DISCONTINUED | OUTPATIENT
Start: 2022-12-01 | End: 2022-12-03 | Stop reason: HOSPADM

## 2022-12-01 RX ORDER — AMLODIPINE BESYLATE 2.5 MG/1
2.5 TABLET ORAL DAILY
Status: DISCONTINUED | OUTPATIENT
Start: 2022-12-02 | End: 2022-12-03 | Stop reason: HOSPADM

## 2022-12-01 RX ORDER — CARBOXYMETHYLCELLULOSE SODIUM 5 MG/ML
1 SOLUTION/ DROPS OPHTHALMIC 4 TIMES DAILY
Qty: 70 EACH | Refills: 11 | Status: SHIPPED | OUTPATIENT
Start: 2022-12-01 | End: 2023-11-14

## 2022-12-01 RX ORDER — ROSUVASTATIN CALCIUM 5 MG/1
5 TABLET, COATED ORAL DAILY
Status: DISCONTINUED | OUTPATIENT
Start: 2022-12-02 | End: 2022-12-03 | Stop reason: HOSPADM

## 2022-12-01 RX ADMIN — CEFEPIME HYDROCHLORIDE 1 G: 1 INJECTION, POWDER, FOR SOLUTION INTRAMUSCULAR; INTRAVENOUS at 18:28

## 2022-12-01 RX ADMIN — AZITHROMYCIN MONOHYDRATE 500 MG: 500 INJECTION, POWDER, LYOPHILIZED, FOR SOLUTION INTRAVENOUS at 19:41

## 2022-12-01 RX ADMIN — IOPAMIDOL 80 ML: 755 INJECTION, SOLUTION INTRAVENOUS at 12:46

## 2022-12-01 RX ADMIN — SODIUM CHLORIDE, PRESERVATIVE FREE 91 ML: 5 INJECTION INTRAVENOUS at 12:45

## 2022-12-01 ASSESSMENT — VISUAL ACUITY
OD_SC+: +1
OS_SC+: -2
OD_SC: 20/70
METHOD: SNELLEN - LINEAR
OS_SC: 20/50

## 2022-12-01 ASSESSMENT — REFRACTION_WEARINGRX
OD_CYLINDER: SPHERE
OS_CYLINDER: SPHERE
OD_SPHERE: +3.00
OS_SPHERE: +3.00

## 2022-12-01 ASSESSMENT — ENCOUNTER SYMPTOMS
HEADACHES: 0
NECK PAIN: 0
PALPITATIONS: 0
LIGHT-HEADEDNESS: 1
NECK STIFFNESS: 0
COUGH: 0
SHORTNESS OF BREATH: 1
WEAKNESS: 0
ABDOMINAL PAIN: 0
GASTROINTESTINAL NEGATIVE: 1
BACK PAIN: 0
FATIGUE: 1

## 2022-12-01 ASSESSMENT — SLIT LAMP EXAM - LIDS
COMMENTS: 1+ BLEPH, THICKENED MARGINS, 1-2+ MGD
COMMENTS: 1+ BLEPH, THICKENED MARGINS, 1-2+ MGD

## 2022-12-01 ASSESSMENT — REFRACTION_CURRENTRX
OD_DIAMETER: 14.9
OD_SPHERE: -2.50
OD_BASECURVE: 7.8
OD_BRAND: ONEFIT

## 2022-12-01 ASSESSMENT — ACTIVITIES OF DAILY LIVING (ADL)
ADLS_ACUITY_SCORE: 35
ADLS_ACUITY_SCORE: 33
ADLS_ACUITY_SCORE: 35

## 2022-12-01 ASSESSMENT — TONOMETRY
OD_IOP_MMHG: 10
OS_IOP_MMHG: 10
IOP_METHOD: ICARE

## 2022-12-01 NOTE — PROGRESS NOTES
A/P  1.) Dry Eye OU  -s/p BMT 08/2021  -Worsening dryness right eye>left eye, symptomatic for photophobia/tearing  -Failed: Restasis/Xiidra (stinging), plugs (scarred puncta)  -Now not tolerating BCL (does not retain in the eye). Tapering off oral steroids, eyes significantly worse. Right eye >>>> left eye  -Several filaments removed today right eye  -Strongly rec scleral lens given symptoms. Good fit with trial lens today. Wife successful I&R. Reviewed CL care and hygiene ([employer], [employer])  -Continue FML bid (IOP okay again today)      2.) Hyperopia/Presbyopia OU  -Previously BCVA 20/20 with correction. Uses low plus readers for distance and higher plus readers for near  -Likely having cataract progression 2' to steroid use but monitor for now    Dispensed trial lens. Order Rx lens and HOLD for appt in 2 week    I have confirmed the patient's CC, HPI and reviewed Past Medical History, Past Surgical History, Social History, Family History, Problem List, Medication List and agree with Tech note.     Aisha Juarez, OD [facility]

## 2022-12-01 NOTE — PROGRESS NOTES
BMT Brief Progress Note:    Subjective/HPI: 61yo D+478 from NMA allo sib PBSCT for Ph+ ALL c/b ongoing challenges with GVHD (skin, mucosa, liver, and ocular most recently on prednisone and belumosudil), along with h/o of RCC s/p nephrectomy and c/b CKD who presents with malaise and dyspnea.    Recently found to have influenza infection. S/p course of Tamiflu. Followed frequently by Dr. Avila Tobar and her team in BMT clinic given ongoing struggles. Appears he feels he never really recovered and continues to have significant malaise.     More distant history is quite complex, with a variety of issues following his transplant. Most notable is ongoing issues with GVHD, which appears to involve the skin, mucosa, and ocular compartments with a history of liver involvement. He has been tapering his prednisone since March of this year.     Objective: Na 131. Cr 1.24 (around baseline). WBC 9.9, Hgb 12.1, plts 101. Influenza A PCR positive. CTA chest without PE, but with L base tree-in-bud opacities and some more central cavitary nodules.    Assessment: 61yo with history of allo SCT for Ph+ ALL c/b cGVHD and recent influenza infection who presents with fever and malaise.    # ALL s/p allo SCT (D+478)  # cGVHD currently managed with prednisone and belumosudil  # Recent influenza A infection with persistent viral shedding  # C/f secondary bacterial PNA  # Hyponatremia, mild  # CKD  # H/o EBV and CMV viremia (most recent measures both undetectable in 11/2022)  # Secondary iron overload (receieving intermittent phlebotomy)    He is s/p a 5-day course of Tamiflu and the relevance of continued positivity on PCR is unclear. Though possible, I doubt that his lung findings are related to ongoing influenza, and more likely represent a secondary bacterial infection. Of note, he has had a variety of imaging abnormalities of the lungs in the past, including cavitary findings that are likely d/t consolidations surrounding his pre-existing  centrilobular emphysema. This is likely the case with his current imaging findings.    He certainly is at risk for atypical infections, including fungal infections, but has been on prophylaxis with posaconazole. For now, it would seem to be reasonable to start with broad bacterial coverage, narrowing or transitioning to an antifungal focus as we gather additional data. Depending on our degree of concern, it might be beneficial to obtain BAL samples with bronchoscopy, while asking our ID colleagues help to weigh in on antimicrobial management along with the relevance of his ongoing influenza positivity and role for further antiviral treatments.    As always, another consideration is whether this is a manifestation of GVHD, but given its unilateral nature and lack of typical imaging appearance, I favor alternative etiologies and we should rule them out first.    Plan:  - Admit to BMT, discussed with Dr. Diaz  - Please admit to 5B if possible given influenza positivity to reduce risk for transmission to BMT pts  - Start cefepime and azithromycin for now (note he is azithro 250mg for bacterial ppx)  - Continue posaconazole ppx dosed for now (he is also receiving pentamidine for ppx)  - Reasonable to continue his immunosuppressive regimen at this time, including prednisone 15mg and belumosudil (may need to discuss utility of the latter or both, especially if his condition worsens)  - Add-on procalcitonin (ordered)  - Sputum culture if he is able to produce sufficient sample  - AM pulmonology and ID consults    Discussed with Dr. Daniel.    Stefan Conklin MD PhD  Hematology/Oncology Fellow  Pgr: [phone]

## 2022-12-01 NOTE — ED PROVIDER NOTES
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    Patient presents today with his loved one with concern for worsening shortness of breath. They have noticed a gradual decline with worsening fatigue and worsening shortness of breath probably over at least the last 6 weeks.  Around that time they changed his medications a bit, changing his antirejection medications in mid October, put on Belumosudil for his GVHD, as well as changed to chlorthalidone.  Outside of those medication changes he has also been diagnosed with influenza at one point, they think probably about a week ago.  And additionally they note a prior history of PE.  Though previously on Xarelto, I do not see that he is currently on any chronic anticoagulation. They do note that the patient has had some lower extremity edema, but not particularly changed, no calf tenderness.  No particular cough or sputum production, no hemoptysis.  Has felt a little bit lightheaded, but no syncope, near syncope, vertigo, vision changes or new numbness, tingling or weakness.  No traumas or falls.  No new headaches or HEENT symptoms.  No new neck pain, stiffness or back symptoms.  No chest pain or palpitations.,  Just the worsening shortness of breath as mentioned.  No abdominal pain or new GI/ symptoms.  No rashes or skin changes from previous baseline.  No other new symptoms or complaints at this time.  Please see ROS for further details.    Past Medical History  Past Medical History:   Diagnosis Date     Cancer (H)     renal cell     CKD  (chronic kidney disease)      Thyroid disease      Past Surgical History:   Procedure Laterality Date     BACK SURGERY  2017     BONE MARROW BIOPSY, BONE SPECIMEN, NEEDLE/TROCAR Left 9/2/2021    Procedure: BIOPSY, BONE MARROW;  Surgeon: Jailyn Ny APRN CNP;  Location: [facility] OR     BONE MARROW BIOPSY, BONE SPECIMEN, NEEDLE/TROCAR Left 11/15/2021    Procedure: BIOPSY, BONE MARROW;  Surgeon: Socrates De La Torre;  Location: [facility] OR     BONE MARROW BIOPSY, BONE SPECIMEN, NEEDLE/TROCAR Left 2/7/2022    Procedure: BIOPSY, BONE MARROW;  Surgeon: Renetta Wiggins PA-C;  Location: [facility] OR     BONE MARROW BIOPSY, BONE SPECIMEN, NEEDLE/TROCAR Left 8/18/2022    Procedure: BIOPSY, BONE MARROW;  Surgeon: Lorrie Yap PA-C;  Location: [facility] OR     BRONCHOSCOPY (RIGID OR FLEXIBLE), DIAGNOSTIC N/A 4/29/2022    Procedure: BRONCHOSCOPY, WITH BRONCHOALVEOLAR LAVAGE;  Surgeon: Murtaza Garcia MD;  Location: [facility]     IR CVC TUNNEL PLACEMENT > 5 YRS OF AGE  8/4/2021     IR CVC TUNNEL REMOVAL LEFT  9/2/2021     IR LIVER BIOPSY PERCUTANEOUS  2/21/2022     PERCUTANEOUS BIOPSY LIVER N/A 2/21/2022    Procedure: NEEDLE BIOPSY, LIVER, PERCUTANEOUS;  Surgeon: Trace Castellanos MD;  Location: [facility] OR     acyclovir (ZOVIRAX) 800 MG tablet  amLODIPine (NORVASC) 2.5 MG tablet  azithromycin (ZITHROMAX) 250 MG tablet  Belumosudil Mesylate 200 MG TABS  Belumosudil Mesylate 200 MG TABS  Carboxymethylcell-Glycerin PF (REFRESH RELIEVA PF) 0.5-1 % SOLN  carboxymethylcellulose PF (CARBOXYMETHYLCELLULOSE SODIUM) 0.5 % ophthalmic solution  chlorthalidone (HYGROTON) 25 MG tablet  clobetasol (TEMOVATE) 0.05 % external ointment  dexamethasone alcohol-free (DECADRON) 0.1 MG/ML solution  doxycycline hyclate (VIBRA-TABS) 100 MG tablet  erythromycin (ROMYCIN) 5 MG/GM ophthalmic ointment  escitalopram (LEXAPRO) 10 MG tablet  fluorometholone (FML LIQUIFILM) 0.1 % ophthalmic suspension  levothyroxine (SYNTHROID/LEVOTHROID) 150 MCG  tablet  LORazepam (ATIVAN) 1 MG tablet  magnesium oxide (MAG-OX) 400 MG tablet  melatonin 5 MG CAPS  nicotine polacrilex (NICORETTE) 4 MG gum  omeprazole (PRILOSEC) 40 MG DR capsule  posaconazole (NOXAFIL) 100 MG EC tablet  predniSONE (DELTASONE) 10 MG tablet  predniSONE (DELTASONE) 20 MG tablet  predniSONE (DELTASONE) 5 MG tablet  rosuvastatin (CRESTOR) 5 MG tablet  sennosides (SENOKOT) 8.6 MG tablet  sodium chloride 0.9 % neb solution  traZODone (DESYREL) 50 MG tablet      Allergies   Allergen Reactions     Sulfamethoxazole W/Trimethoprim Rash     Family History  Family History   Problem Relation Age of Onset     Lung Cancer Mother      Polycythemia Father      Coronary Artery Disease Maternal Grandmother      Social History   Social History     Tobacco Use     Smoking status: Former     Packs/day: 2.00     Years: 30.00     Pack years: 60.00     Types: Cigarettes     Quit date: 5/4/2019     Years since quitting: 3.5     Smokeless tobacco: Never   Substance Use Topics     Alcohol use: Not Currently     Drug use: Never      Past medical history, past surgical history, medications, allergies, family history, and social history were reviewed with the patient. No additional pertinent items.       Review of Systems   Constitutional: Positive for fatigue. Negative for fever.   HENT: Negative.    Eyes: Negative for visual disturbance.   Respiratory: Positive for shortness of breath. Negative for cough (no sputum production, no hemoptysis).    Cardiovascular: Positive for leg swelling (unchanged, no calf tenderness). Negative for chest pain and palpitations.   Gastrointestinal: Negative.  Negative for abdominal pain and vomiting.   Genitourinary: Negative.    Musculoskeletal: Negative for back pain, neck pain and neck stiffness.   Skin: Negative.  Negative for rash.   Neurological: Positive for light-headedness. Negative for syncope, weakness and headaches.   Hematological:        BMT   Psychiatric/Behavioral: Negative for  "suicidal ideas.   All other systems reviewed and are negative.    A complete review of systems was performed with pertinent positives and negatives noted in the HPI, and all other systems negative.    Physical Exam   BP: 131/79  Pulse: 80  Temp: 98  F (36.7  C)  Resp: 18  Height: 185.4 cm (6' 1\")  Weight: 111.1 kg (245 lb)  SpO2: 96 %  Physical Exam  CONSTITUTIONAL: Well-developed and well-nourished. Awake and alert. Non-toxic appearance. No acute distress.   HENT:   - Head: Normocephalic and atraumatic.   - Ears: External ear grossly normal.   - Nose: Nose normal. No rhinorrhea. No epistaxis.   - Mouth/Throat: MMM  EYES: Conjunctivae and lids are normal. No scleral icterus.   NECK: Normal range of motion and phonation normal. Neck supple.  No tracheal deviation, no stridor. No edema or erythema noted.  CARDIOVASCULAR: Normal rate, regular rhythm and no appreciable abnormal heart sounds.  PULMONARY/CHEST: Normal work of breathing. No accessory muscle usage or stridor. No respiratory distress. Patient does have some expiratory wheeze/coarse breath sounds at both bases.  ABDOMEN: Soft, non-distended. No tenderness. No peritoneal findings, no rigidity, rebound or guarding.  No palpable masses or abnormal pulsatility appreciated.  MUSCULOSKELETAL: Extremities warm and seemingly well perfused. Mild nonpitting edema bilateral lower extremities, no calf tenderness.  NEUROLOGIC: Awake, alert. Not disoriented. No seizure activity. GCS 15  SKIN: Skin is warm and dry. No rash noted. No diaphoresis. No pallor.   PSYCHIATRIC: Normal mood and affect. Speech and behavior normal. Thought processes linear. Cognition and memory are normal. No SI/HI reported.    ED Course          EKG Interpretation:      Interpreted by Veronica Alba MD  Time reviewed: 11:13  Symptoms at time of EKG: shortness of breath   Rhythm: normal sinus   Rate: Normal  Axis: Normal  Ectopy: none  ST Segments/ T Waves: No acute appearing ST-T wave " changes  Comparison to prior:   Vs. 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    Clinically, patient peers nontoxic, NAD.  Vitals grossly WNL.  Otherwise on examination, he does have coarse breath sounds with expiration bilateral bases and some mild nonpitting edema to both lower extremities, but no calf tenderness.  No other acute findings on exam.    DDx includes, but not limited to, worsening of his [id number] disease, complication from his prior malignancy, occult infection or symptoms that are more exacerbated from his relatively recent influenza diagnosis, recurrent PE (as he is no longer anticoagulated), other infectious etiology, cardiac etiology or heart failure (though less findings for such on exam), medication side effect, amongst others.    PLAN:   - Screening ECG, laboratory studies, chest imaging with CT given his previous history of PE and not being currently anticoagulated, will discuss with the specialty teams as needed based on those results, dispo pending ED course  - Risks/benefits of pursuing imaging reviewed and accepted.     RESULTS:  Labs:   - pH 7.42, CO2 38, bicarb 25, POC sodium 130, potassium 4.8  - COVID negative, influenza positive  - CMP shows an NA of 131, CL 97, creatinine 1.24 with BUN of 39.2 (slightly improved from most recent), troponin 21, BNP 33  - No leukocytosis, Hgb 12.1 is up from previous  Imaging: Written preliminary reports reviewed and discussed w/ patient and BMT    INTERVENTIONS:   -  IV Abx: Cefepime, azithromycin (discussed w/ BMT)  - BMT discussion/admission    RE-EVALUATION:  - Pt otherwise continues to do well here in the ED, no acute issues or apparent concerning changes in vitals or clinical appearance.    DISCUSSIONS:  - w/ BM: Reviewed patient/presentation, current state of workup/any pending studies. They will admit for further evaluation/management (Requesting unit 5B, which we've communicated to PPM), F/U pending studies as needed, coordinate w/ consulting services as needed. No additional requests/recommendations for workup/management for in the ED at this time.   - w/ Patient: I have reviewed the available findings, plan with the patient and his loved one/guest. They expressed understanding and agreement with this plan. All questions answered to the best of our ability at this time.     DISPOSITION/PLANNING:  IMPRESSION:   - Influenza A  - Atypical infection  - Worsening shortness of breath, fatigue/malaise  DISPOSITION:  - Admit to BMT (5B - Request from BMT passed along to PPM)    ______________________________________________________________________    This part of the medical record was transcribed by Joao Karimi, Medical Scribe, from a dictation done by Veronica Alba MD.     --  Veronica Alba MD  [facility] EMERGENCY DEPARTMENT  12/1/2022     Veronica Alba MD  12/02/22 0201

## 2022-12-01 NOTE — NURSING NOTE
Chief Complaints and History of Present Illnesses   Patient presents with     Dry Eye(s) Follow-Up     Pt here for for dry eye follow up.      Chief Complaint(s) and History of Present Illness(es)     Dry Eye(s) Follow-Up            Comments: Pt here for for dry eye follow up.           Comments    Pt BCL fell out right eye after a couple of days. Vision is unchanged. Pt interested in Serum tears- Pt was reduced on oral steroid to 15 mg and felt eyes have gotten worse. Pt also notes he was placed on a new anti rejection medication.   Pt using:  FML BID each eye -- ran out about 1 week ago.   ATs TID each eye   Erythromycin at night  BOGDAN PALOMARES 8:26 AM December 1, 2022

## 2022-12-01 NOTE — ED TRIAGE NOTES
Pt presents with a 1 week history of SOB.  States that today he has to actively think about breathing.  Called primary and advised to present to ED. Hx of PE and stem cell transplant     Triage Assessment     Row Name 12/01/22 1028       Triage Assessment (Adult)    Airway WDL WDL       Respiratory WDL    Respiratory WDL X       Skin Circulation/Temperature WDL    Skin Circulation/Temperature WDL WDL       Cardiac WDL    Cardiac WDL WDL       Peripheral/Neurovascular WDL    Peripheral Neurovascular WDL WDL       Cognitive/Neuro/Behavioral WDL    Cognitive/Neuro/Behavioral WDL WDL

## 2022-12-01 NOTE — PROGRESS NOTES
BMT Nurse Triage - Generic/Other Symptoms     Treating Provider:   Dr. Avila Tobar    Date of last office visit: 11/22/22    Onset of symptoms: 3 days ago     Duration of symptoms: 3 day    Brief description of symptoms: Shortness of breath, lethargy, feeling like a weight on his chest.    RNCC called pt's wife and advised they go to the ED, she reported they are in the clinic right now for an eye appointment and prefer to see someone in clinic, spoke with GIRISH on call who also agreed pt should go to ED.  Discussed again with pt's wife who agreed to take pt to ED.

## 2022-12-02 ENCOUNTER — APPOINTMENT (OUTPATIENT)
Dept: ULTRASOUND IMAGING | Facility: CLINIC | Age: 62
End: 2022-12-02
Attending: PHYSICIAN ASSISTANT
Payer: COMMERCIAL

## 2022-12-02 ENCOUNTER — TELEPHONE (OUTPATIENT)
Dept: OPTOMETRY | Facility: CLINIC | Age: 62
End: 2022-12-02

## 2022-12-02 ENCOUNTER — APPOINTMENT (OUTPATIENT)
Dept: CARDIOLOGY | Facility: CLINIC | Age: 62
End: 2022-12-02
Attending: PHYSICIAN ASSISTANT
Payer: COMMERCIAL

## 2022-12-02 ENCOUNTER — APPOINTMENT (OUTPATIENT)
Dept: PHYSICAL THERAPY | Facility: CLINIC | Age: 62
End: 2022-12-02
Attending: STUDENT IN AN ORGANIZED HEALTH CARE EDUCATION/TRAINING PROGRAM
Payer: COMMERCIAL

## 2022-12-02 LAB
ALBUMIN SERPL BCG-MCNC: 4 G/DL (ref 3.5–5.2)
ALP SERPL-CCNC: 115 U/L (ref 40–129)
ALT SERPL W P-5'-P-CCNC: 37 U/L (ref 10–50)
ANION GAP SERPL CALCULATED.3IONS-SCNC: 14 MMOL/L (ref 7–15)
AST SERPL W P-5'-P-CCNC: 38 U/L (ref 10–50)
BASOPHILS # BLD AUTO: 0 10E3/UL (ref 0–0.2)
BASOPHILS NFR BLD AUTO: 0 %
BILIRUB DIRECT SERPL-MCNC: 0.24 MG/DL (ref 0–0.3)
BILIRUB SERPL-MCNC: 1.3 MG/DL
BUN SERPL-MCNC: 38.4 MG/DL (ref 8–23)
CALCIUM SERPL-MCNC: 9.6 MG/DL (ref 8.8–10.2)
CHLORIDE SERPL-SCNC: 98 MMOL/L (ref 98–107)
CREAT SERPL-MCNC: 1.36 MG/DL (ref 0.67–1.17)
DEPRECATED HCO3 PLAS-SCNC: 22 MMOL/L (ref 22–29)
EOSINOPHIL # BLD AUTO: 0 10E3/UL (ref 0–0.7)
EOSINOPHIL NFR BLD AUTO: 0 %
ERYTHROCYTE [DISTWIDTH] IN BLOOD BY AUTOMATED COUNT: 16.1 % (ref 10–15)
GFR SERPL CREATININE-BSD FRML MDRD: 58 ML/MIN/1.73M2
GLUCOSE BLDC GLUCOMTR-MCNC: 114 MG/DL (ref 70–99)
GLUCOSE BLDC GLUCOMTR-MCNC: 91 MG/DL (ref 70–99)
GLUCOSE SERPL-MCNC: 87 MG/DL (ref 70–99)
HCT VFR BLD AUTO: 35 % (ref 40–53)
HGB BLD-MCNC: 12.4 G/DL (ref 13.3–17.7)
IMM GRANULOCYTES # BLD: 0.1 10E3/UL
IMM GRANULOCYTES NFR BLD: 1 %
LVEF ECHO: NORMAL
LYMPHOCYTES # BLD AUTO: 2.8 10E3/UL (ref 0.8–5.3)
LYMPHOCYTES NFR BLD AUTO: 36 %
MAGNESIUM SERPL-MCNC: 2.3 MG/DL (ref 1.7–2.3)
MCH RBC QN AUTO: 35.3 PG (ref 26.5–33)
MCHC RBC AUTO-ENTMCNC: 35.4 G/DL (ref 31.5–36.5)
MCV RBC AUTO: 100 FL (ref 78–100)
MONOCYTES # BLD AUTO: 0.9 10E3/UL (ref 0–1.3)
MONOCYTES NFR BLD AUTO: 12 %
MRSA DNA SPEC QL NAA+PROBE: NEGATIVE
NEUTROPHILS # BLD AUTO: 3.9 10E3/UL (ref 1.6–8.3)
NEUTROPHILS NFR BLD AUTO: 51 %
NRBC # BLD AUTO: 0 10E3/UL
NRBC BLD AUTO-RTO: 0 /100
PLATELET # BLD AUTO: 105 10E3/UL (ref 150–450)
POTASSIUM SERPL-SCNC: 4.6 MMOL/L (ref 3.4–5.3)
PROT SERPL-MCNC: 6.6 G/DL (ref 6.4–8.3)
RBC # BLD AUTO: 3.51 10E6/UL (ref 4.4–5.9)
SA TARGET DNA: NEGATIVE
SODIUM SERPL-SCNC: 134 MMOL/L (ref 136–145)
T4 FREE SERPL-MCNC: 2.09 NG/DL (ref 0.9–1.7)
TSH SERPL DL<=0.005 MIU/L-ACNC: 0.08 UIU/ML (ref 0.3–4.2)
WBC # BLD AUTO: 7.7 10E3/UL (ref 4–11)

## 2022-12-02 PROCEDURE — 83735 ASSAY OF MAGNESIUM: CPT | Performed by: STUDENT IN AN ORGANIZED HEALTH CARE EDUCATION/TRAINING PROGRAM

## 2022-12-02 PROCEDURE — 87305 ASPERGILLUS AG IA: CPT | Performed by: PHYSICIAN ASSISTANT

## 2022-12-02 PROCEDURE — 99223 1ST HOSP IP/OBS HIGH 75: CPT | Performed by: INTERNAL MEDICINE

## 2022-12-02 PROCEDURE — 250N000012 HC RX MED GY IP 250 OP 636 PS 637: Performed by: STUDENT IN AN ORGANIZED HEALTH CARE EDUCATION/TRAINING PROGRAM

## 2022-12-02 PROCEDURE — 80053 COMPREHEN METABOLIC PANEL: CPT | Performed by: STUDENT IN AN ORGANIZED HEALTH CARE EDUCATION/TRAINING PROGRAM

## 2022-12-02 PROCEDURE — 93321 DOPPLER ECHO F-UP/LMTD STD: CPT | Mod: 26 | Performed by: STUDENT IN AN ORGANIZED HEALTH CARE EDUCATION/TRAINING PROGRAM

## 2022-12-02 PROCEDURE — 250N000011 HC RX IP 250 OP 636: Performed by: STUDENT IN AN ORGANIZED HEALTH CARE EDUCATION/TRAINING PROGRAM

## 2022-12-02 PROCEDURE — 97110 THERAPEUTIC EXERCISES: CPT | Mod: GP

## 2022-12-02 PROCEDURE — 85025 COMPLETE CBC W/AUTO DIFF WBC: CPT | Performed by: STUDENT IN AN ORGANIZED HEALTH CARE EDUCATION/TRAINING PROGRAM

## 2022-12-02 PROCEDURE — 93325 DOPPLER ECHO COLOR FLOW MAPG: CPT | Mod: 26 | Performed by: STUDENT IN AN ORGANIZED HEALTH CARE EDUCATION/TRAINING PROGRAM

## 2022-12-02 PROCEDURE — 99207 PR SC NO CHARGE VISIT: CPT | Performed by: STUDENT IN AN ORGANIZED HEALTH CARE EDUCATION/TRAINING PROGRAM

## 2022-12-02 PROCEDURE — 120N000002 HC R&B MED SURG/OB UMMC

## 2022-12-02 PROCEDURE — 97161 PT EVAL LOW COMPLEX 20 MIN: CPT | Mod: GP

## 2022-12-02 PROCEDURE — 250N000009 HC RX 250: Performed by: STUDENT IN AN ORGANIZED HEALTH CARE EDUCATION/TRAINING PROGRAM

## 2022-12-02 PROCEDURE — 93970 EXTREMITY STUDY: CPT

## 2022-12-02 PROCEDURE — 36591 DRAW BLOOD OFF VENOUS DEVICE: CPT | Performed by: PHYSICIAN ASSISTANT

## 2022-12-02 PROCEDURE — 93308 TTE F-UP OR LMTD: CPT

## 2022-12-02 PROCEDURE — 250N000013 HC RX MED GY IP 250 OP 250 PS 637: Performed by: STUDENT IN AN ORGANIZED HEALTH CARE EDUCATION/TRAINING PROGRAM

## 2022-12-02 PROCEDURE — 82248 BILIRUBIN DIRECT: CPT | Performed by: STUDENT IN AN ORGANIZED HEALTH CARE EDUCATION/TRAINING PROGRAM

## 2022-12-02 PROCEDURE — 93308 TTE F-UP OR LMTD: CPT | Mod: 26 | Performed by: STUDENT IN AN ORGANIZED HEALTH CARE EDUCATION/TRAINING PROGRAM

## 2022-12-02 PROCEDURE — 87641 MR-STAPH DNA AMP PROBE: CPT | Performed by: STUDENT IN AN ORGANIZED HEALTH CARE EDUCATION/TRAINING PROGRAM

## 2022-12-02 PROCEDURE — 99233 SBSQ HOSP IP/OBS HIGH 50: CPT | Performed by: PHYSICIAN ASSISTANT

## 2022-12-02 PROCEDURE — 93970 EXTREMITY STUDY: CPT | Mod: 26 | Performed by: RADIOLOGY

## 2022-12-02 PROCEDURE — 99223 1ST HOSP IP/OBS HIGH 75: CPT | Mod: GC | Performed by: INTERNAL MEDICINE

## 2022-12-02 PROCEDURE — 93325 DOPPLER ECHO COLOR FLOW MAPG: CPT

## 2022-12-02 RX ORDER — AZITHROMYCIN 250 MG/1
500 TABLET, FILM COATED ORAL DAILY
Status: DISCONTINUED | OUTPATIENT
Start: 2022-12-03 | End: 2022-12-03 | Stop reason: HOSPADM

## 2022-12-02 RX ORDER — GUAIFENESIN 200 MG/10ML
10 LIQUID ORAL EVERY 4 HOURS PRN
Status: DISCONTINUED | OUTPATIENT
Start: 2022-12-02 | End: 2022-12-03 | Stop reason: HOSPADM

## 2022-12-02 RX ADMIN — ACYCLOVIR 800 MG: 800 TABLET ORAL at 19:45

## 2022-12-02 RX ADMIN — ACYCLOVIR 800 MG: 800 TABLET ORAL at 08:02

## 2022-12-02 RX ADMIN — Medication 1 DROP: at 08:01

## 2022-12-02 RX ADMIN — POSACONAZOLE 300 MG: 100 TABLET, COATED ORAL at 08:26

## 2022-12-02 RX ADMIN — DEXAMETHASONE 2 MG: 0.5 SOLUTION ORAL at 12:48

## 2022-12-02 RX ADMIN — PREDNISONE 15 MG: 5 TABLET ORAL at 08:01

## 2022-12-02 RX ADMIN — AMLODIPINE BESYLATE 2.5 MG: 2.5 TABLET ORAL at 10:21

## 2022-12-02 RX ADMIN — ROSUVASTATIN CALCIUM 5 MG: 5 TABLET, FILM COATED ORAL at 18:05

## 2022-12-02 RX ADMIN — PANTOPRAZOLE SODIUM 40 MG: 40 TABLET, DELAYED RELEASE ORAL at 19:45

## 2022-12-02 RX ADMIN — DEXAMETHASONE 2 MG: 0.5 SOLUTION ORAL at 08:01

## 2022-12-02 RX ADMIN — Medication 800 MG: at 19:45

## 2022-12-02 RX ADMIN — LORAZEPAM 1 MG: 0.5 TABLET ORAL at 19:58

## 2022-12-02 RX ADMIN — ESCITALOPRAM OXALATE 10 MG: 10 TABLET ORAL at 08:02

## 2022-12-02 RX ADMIN — Medication 1 DROP: at 19:44

## 2022-12-02 RX ADMIN — CEFEPIME HYDROCHLORIDE 2 G: 2 INJECTION, POWDER, FOR SOLUTION INTRAVENOUS at 10:20

## 2022-12-02 RX ADMIN — FLUOROMETHOLONE 1 DROP: 1 SOLUTION/ DROPS OPHTHALMIC at 08:02

## 2022-12-02 RX ADMIN — DEXAMETHASONE 2 MG: 0.5 SOLUTION ORAL at 19:44

## 2022-12-02 RX ADMIN — PANTOPRAZOLE SODIUM 40 MG: 40 TABLET, DELAYED RELEASE ORAL at 08:02

## 2022-12-02 RX ADMIN — LEVOTHYROXINE SODIUM 150 MCG: 0.07 TABLET ORAL at 08:02

## 2022-12-02 RX ADMIN — AZITHROMYCIN MONOHYDRATE 500 MG: 250 TABLET ORAL at 08:01

## 2022-12-02 RX ADMIN — FLUOROMETHOLONE 1 DROP: 1 SOLUTION/ DROPS OPHTHALMIC at 19:43

## 2022-12-02 RX ADMIN — TRAZODONE HYDROCHLORIDE 50 MG: 50 TABLET ORAL at 22:27

## 2022-12-02 RX ADMIN — ERYTHROMYCIN 0.5 INCH: 5 OINTMENT OPHTHALMIC at 22:27

## 2022-12-02 RX ADMIN — Medication 1 DROP: at 12:47

## 2022-12-02 RX ADMIN — Medication 800 MG: at 08:02

## 2022-12-02 RX ADMIN — Medication 1 DROP: at 16:21

## 2022-12-02 RX ADMIN — Medication 2 MG: at 18:05

## 2022-12-02 RX ADMIN — DEXAMETHASONE 2 MG: 0.5 SOLUTION ORAL at 16:21

## 2022-12-02 ASSESSMENT — ACTIVITIES OF DAILY LIVING (ADL)
ADLS_ACUITY_SCORE: 35
ADLS_ACUITY_SCORE: 37
ADLS_ACUITY_SCORE: 35
ADLS_ACUITY_SCORE: 35
ADLS_ACUITY_SCORE: 37
ADLS_ACUITY_SCORE: 35
ADLS_ACUITY_SCORE: 39
ADLS_ACUITY_SCORE: 39

## 2022-12-02 ASSESSMENT — ENCOUNTER SYMPTOMS
VOMITING: 0
FEVER: 0

## 2022-12-02 NOTE — CONSULTS
[facility]  Transplant Infectious Disease Consult Note - New Patient     Patient:  Nik Nava, Date of birth 1960, Medical record number [id number]  Date of Visit:  12/02/2022  Consult requested by Dr. Joao Daniel for evaluation of fatigue and SOB.    Recommendations:   1. Obtain 1-3 BD glucan, Aspergillus GM, urine Histoplasma and Blastomyces urine antigens, and posaconazole level in the AM  2. Discontinue cefepime  3. Reasonable to continue oral azithromycin 500 mg daily x 3 days   4. Will defer additional influenza A treatment given mild symptoms and lack of improvement with previous round of oseltamivir  5. Consider belumosudil as a cause for shortness of breath  6. Does not have a productive cough to obtain a sputum sample  7. Pulmonology not planning on a bronchoscopy which I agree with for the time being. We can re-evaluate the use of oseltamivir and/or bronchoscopy if clinically he worsens.     Thank you very much for this consultation. Transplant Infectious Disease will continue to follow with you.    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     Acute on subacute shortness of breath  Influenza A 11/23/22  Tree in bud small nodules in bilateral lower lobes (L>R)  His shortness of breath started about 4 weeks ago. He makes a strong correlation that his SOB started after initiating belumosudil which was started about 6 weeks ago. Based on the side effect profile SOB can occur in ~ 1/3 of patients on this medication. He had worsening SOB and diagnosed with influenza A on 11/23, and received oseltamivir for 5 days. He did  not notice an improvement in his symptoms with oseltamivir. His SOB has continued to progress prompting him to be come back to the ED. CT chest does have small nodularity in the lower lobes but the degree and cavitation mentioned is very minimal. This could be representative of resolving influenza. He is at risk for a superimposed bacterial infection so coverage with azithromycin is reasonable. There is not the appearance of a lobar pneumonia so likely does not need broad cefepime coverage. Given his steroid use and that he is on second line PJP prophylaxis will send 1-3 BD glucan to risk stratify for PJP. There are no plans for a bronchoscopy at this time.   He is at risk for prolonged shedding of influenza given his immunosuppressed state but given his minimal symptoms and lack of improvement on oseltamivir will defer another 5 days of oselamivir. There is minimal data for extending treatment to 10 days but can be considered in this patient population.   He does not have a fever or leukocytosis which could support a non-infectious etiology.    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 Not detected on 11/8/22.    GVHD: skin, liver, ocular, oral.  On a clinical trail PQRST. Previous tried tacrolimus and sirolimus due to side effects. Has been on various doses of prednisone since March 2022. In October he was noted to have a flair so resumed a prednisone taper. Started Belumosudil on 10/18/22.    Previous ID issues:  -Cat scratch 11/3/22 received doxycyline x 10 days  -History of COVID 5/2022  -History of pulmonary infiltrates. 4/2022 CT chest with RUL infiltrate. Bronchoscopy unremarkable. Received micafungin. 8/2022 with CT improvement. Micafungin transitioned to posaconazole prophylaxis.  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      Other ID considerations:  - QTc interval: 435 mx 12/1/11  - Bacterial prophylaxis: azithromycin, stopped levofloxacin due to possible tendonitis  - Pneumocystis  prophylaxis: pentamidine, last dose 11/22/22  - Viral serostatus & prophylaxis:CMV IgG+, HSV 1+/2-; acyclovir  - Fungal prophylaxis: posaconazole, last level 8/29/22 2.5  - Immunization status: covid 19 x 3 (last 11/16/22), influenza 11/16/22  - Immunoglobulins:8/18/22 652  - Isolation status: droplet       History of Infectious Disease Illness:   63 y/o male with a history of ph+ALL s/p NMA allo sibling PBSCT (2021) c/b cGVHD (skin, oral, liver, ocular) on numerous agents, influenza A 11/23/22 who was admitted on 12/1/22 for shortness of breath and fatigue. His SOB started about 4 weeks ago. He correlates the onset of his with starting belumosudil for GVHD which was started about 6 weeks ago. Since then his SOB and fatigue has progressively worsened. He has also been on prendisone since ~ October and is currently on a taper with a dose of prednisone is 15 mg. Has been on pentamidine prophylaxis (last dose 11/22). On 11/23 he presented to the ED with worsening SOB and tested positive for influenza. Received oseltamivir for 5 days. No imaging performed at that time. He does not think the oseltamivir was helpful and did not improve his SOB.     His SOB with associated chest pain continued prompting him to return to the ED on 12/1.  PTA prophylaxis include acyclovir, posconazole, and pentamidine. Started posaconazole 8/23/22. 8/29/22 posaconazole level was 2.5.     On admission he was afebrile. WBC normal. After admission he was started on azithromycin and cefepime. 11/29/22 CXR was negative. CT chest demonstrated tree in bud nodularity with fainting centrally cavitation nodules. Influenza A still positive. CMV PCR negative.  Creatinine at baseline ~ 1.2-1.4.LFTs are normal. TSH very low at 0.08.   TTE with EF 55-60%.     He thinks there has been some improvement in SOB since admission. Not on oxygen. Denies sinus congestion, sore throat, nausea, vomiting, diarrhea, dysuria.   + GVHD in eyes and mouth.    Born [place] in [place] the [place]. Did not help winterize it this year. [place]. Does not have a hot tub or pool. Only a dog at home. Does not hunt. His wife has been coughing but she tested negative for influenza.    Review of Systems: Remaining systems all reviewed and negative.    Past Medical History:   Diagnosis Date     ALL (acute lymphocytic leukemia) (H)      Cancer (H)     renal cell     CKD (chronic kidney disease)      H/O peripheral stem cell transplant (H)      Thyroid disease        Past Surgical History:   Procedure Laterality Date     BACK SURGERY  2017     BONE MARROW BIOPSY, BONE SPECIMEN, NEEDLE/TROCAR Left 9/2/2021    Procedure: BIOPSY, BONE MARROW;  Surgeon: Jailyn Ny APRN CNP;  Location: [facility] OR     BONE MARROW BIOPSY, BONE SPECIMEN, NEEDLE/TROCAR Left 11/15/2021    Procedure: BIOPSY, BONE MARROW;  Surgeon: Socrates De La Torre;  Location: [facility] OR     BONE MARROW BIOPSY, BONE SPECIMEN, NEEDLE/TROCAR Left 2/7/2022    Procedure: BIOPSY, BONE MARROW;  Surgeon: Renetta Wiggins PA-C;  Location: [facility] OR     BONE MARROW BIOPSY, BONE SPECIMEN, NEEDLE/TROCAR Left 8/18/2022    Procedure: BIOPSY, BONE MARROW;  Surgeon: Lorrie Yap PA-C;  Location: [facility] OR     BRONCHOSCOPY (RIGID OR FLEXIBLE), DIAGNOSTIC N/A 4/29/2022    Procedure: BRONCHOSCOPY, WITH BRONCHOALVEOLAR LAVAGE;  Surgeon: Murtaza Garcia MD;  Location: [facility]     IR CVC TUNNEL PLACEMENT > 5 YRS OF AGE  8/4/2021     IR CVC TUNNEL REMOVAL LEFT  9/2/2021     IR LIVER BIOPSY PERCUTANEOUS  2/21/2022     PERCUTANEOUS BIOPSY LIVER N/A 2/21/2022    Procedure: NEEDLE BIOPSY, LIVER, PERCUTANEOUS;  Surgeon: Trace Castellanos MD;  Location: [facility] OR       Family History   Problem Relation Age of Onset     Lung Cancer Mother      Polycythemia Father      Coronary Artery Disease Maternal Grandmother        Social History     Social History Narrative     Not on file     Social  History     Tobacco Use     Smoking status: Former     Packs/day: 2.00     Years: 30.00     Pack years: 60.00     Types: Cigarettes     Quit date: 5/4/2019     Years since quitting: 3.5     Smokeless tobacco: Never   Substance Use Topics     Alcohol use: Not Currently     Drug use: Never       Immunization History   Administered Date(s) Administered     COVID-19 Vaccine 12+ (Pfizer) 11/18/2021, 12/14/2021     COVID-19 Vaccine Bivalent Booster 12+ (Pfizer) 11/16/2022     Influenza Vaccine 50-64 or 18-64 w/egg allergy (Flublok) 10/12/2021       Patient Active Problem List   Diagnosis     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)     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)     Status post bone marrow transplant (H)     Musa-Barr virus viremia     Generalized muscle weakness     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)     Shortness of breath     Influenza A            Current Medications & Allergies:       acyclovir  800 mg Oral BID     amLODIPine  2.5 mg Oral Daily     azithromycin  500 mg Oral Daily     carboxymethylcellulose PF  1 drop Both Eyes 4x Daily     ceFEPIme  2 g Intravenous Q8H     dexamethasone alcohol-free  2 mg Swish & Spit 4x Daily     erythromycin  0.5 inch Both Eyes At Bedtime     escitalopram  10 mg Oral Daily     fluorometholone  1 drop Both Eyes BID     levothyroxine  150 mcg Oral Daily     magnesium oxide  800 mg Oral BID     pantoprazole  40 mg Oral BID     posaconazole  300 mg Oral QAM     predniSONE  15 mg Oral QAM     rosuvastatin  5 mg Oral Daily     traZODone  50 mg Oral At Bedtime     Allergies   Allergen Reactions     Sulfamethoxazole W/Trimethoprim Rash            Physical Exam:     Patient Vitals for the past 24 hrs:   BP Pulse Resp SpO2   12/02/22 1021 128/78 77 16 97 %   12/02/22 0700 120/77 74 16 92 %   12/02/22 0242 137/81 74 16 93 %   12/01/22 1844 108/72 68 17 94 %     Ranges for vital signs:  Pulse:  [68-77] 77  Resp:  [16-17] 16  BP:  (108-137)/(72-81) 128/78  SpO2:  [92 %-97 %] 97 %  Vitals:    12/01/22 1026   Weight: 111.1 kg (245 lb)       Physical Examination:  GENERAL:  well-appearing, in no acute distress. In bed. Pleasant.   HEAD:  Head is normocephalic, atraumatic   EYES:  Eyes have anicteric sclerae without conjunctival injection. Pupils reactive bilaterally.    ENT:  Oropharynx with evidence of a white film c/w GVHD  LUNGS:  Clear to auscultation bilaterally. No wheezing.  No accessory muscle use. Not on oxygen.   CARDIOVASCULAR:  Regular rate and rhythm with no murmurs  ABDOMEN:  Normal bowel sounds, soft, non-tender, non-distended  SKIN: scattered ecchymosis on hands and arms  EXTREMITIES: no joint swelling or erythema  NEUROLOGIC:  Alert. Oriented. Grossly nonfocal. Active x4 extremities.  LINES: port in place. No erythema around insertion site and dressing intact.          Laboratory Data:     Metabolic Studies       Recent Labs   Lab Test 12/02/22  1331 12/02/22  0820 12/02/22  0800 12/01/22  1114 12/01/22  1112 11/29/22  1047 11/22/22  0817 04/28/22  1408 04/22/22  0845 09/14/21  1030 09/12/21  1045 08/18/21  0356 08/17/21  1320   NA  --  134*  --  130* 131*   < > 135*   < > 135   < > 131*   < >  --    POTASSIUM  --  4.6  --  4.8 4.9   < > 4.8   < > 4.8   < > 4.5   < >  --    CHLORIDE  --  98  --   --  97*   < > 97*   < > 101   < > 100   < >  --    CO2  --  22  --   --  23   < > 27   < > 29   < > 22   < >  --    ANIONGAP  --  14  --   --  11   < > 11   < > 5   < > 9   < >  --    BUN  --  38.4*  --   --  39.2*   < > 51.1*   < > 29   < > 29   < >  --    CR  --  1.36*  --   --  1.24*   < > 1.25*   < > 0.97   < > 1.49*   < >  --    GFRESTIMATED  --  58*  --   --  65   < > 64   < > 88   < > 49*   < >  --    * 87   < > 112* 111*   < > 98   < > 98   < > 105*   < >  --    DAMON  --  9.6  --   --  9.6   < > 9.7   < > 9.5   < > 9.7   < >  --    PHOS  --   --   --   --  3.1  --   --    < >  --   --   --    < >  --    MAG  --  2.3  --    --  2.1  --  2.1   < > 1.6   < >  --    < >  --    LACT  --   --   --   --   --   --   --   --   --   --  0.6*  --  0.4*   PCAL  --   --   --   --  0.18*  --   --   --   --   --   --   --   --    FGTL  --   --   --   --   --   --   --   --  <31  --   --   --   --    CKT  --   --   --   --   --   --  65  --   --   --   --   --   --     < > = values in this interval not displayed.       Hepatic Studies    Recent Labs   Lab Test 12/02/22  0820 12/01/22  1112 11/29/22  1047 04/12/22  0951 04/06/22  0947   BILITOTAL 1.3* 1.1 1.1   < > 0.7   DBIL 0.24  --   --    < >  --    ALKPHOS 115 124 113   < > 249*   PROTTOTAL 6.6 6.6 6.7   < > 5.9*   ALBUMIN 4.0 4.0 4.0   < > 2.5*   AST 38 37 41   < > 114*   ALT 37 39 40   < > 239*   LDH  --   --   --   --  350*    < > = values in this interval not displayed.       Hematology Studies      Recent Labs   Lab Test 12/02/22  0820 12/01/22  1114 12/01/22  1040 11/29/22  1047 11/22/22  0817 11/15/22  1059 11/08/22  1446 11/09/21  0931 10/26/21  0955 09/09/21  1011 09/07/21  1530   WBC 7.7  --  9.9 9.1 12.3* 10.7 11.2*   < > 5.7   < > 3.9*   ANEU  --   --   --   --   --   --   --   --  3.5  --  1.9   ALYM  --   --   --   --   --   --   --   --  1.7  --  0.8   BOSTON  --   --   --   --   --   --   --   --  0.2  --  0.9   AEOS  --   --   --   --   --   --   --   --  0.2  --  0.2   HGB 12.4*   < > 12.1* 12.0* 11.3* 11.9* 11.9*   < > 10.4*   < > 9.2*   HCT 35.0*   < > 35.0* 34.5* 33.7* 34.5* 34.4*   < > 31.0*   < > 27.0*   *  --  101* 94* 139* 143* 150   < > 169   < > 145*    < > = values in this interval not displayed.       Medication levels    Recent Labs   Lab Test 10/14/22  1229 10/03/22  0755 09/26/22  0803   TACROL  --   --  <1.0*   RAPAMY 3.5*   < > 6.8    < > = values in this interval not displayed.     Inflammatory Markers    Recent Labs   Lab Test 09/12/21  1219 09/12/21  1045   SED 56*  --    CRP  --  4.5       Immune Globulin Studies     Recent Labs   Lab Test 08/18/22  1030  06/21/22  1555 04/12/22  0951 11/15/21  0930    704 859 894   IGM 38  --   --  88   IGA 77*  --   --  231       Pancreatitis testing    Recent Labs   Lab Test 10/31/22  0819 08/18/22  1030 04/06/22  0947   AMYLASE  --   --  85   TRIG 153*   < >  --     < > = values in this interval not displayed.       Gout Labs      Recent Labs   Lab Test 12/01/22  1112 08/03/21  0932 07/08/21  1055   URIC 4.9 5.4 4.8       Clotting Studies    Recent Labs   Lab Test 12/01/22  2101 12/01/22  1112 02/07/22  0945 11/15/21  0930   INR 0.90 0.86 0.96 1.07   PTT 22  --   --   --      Thyroid Studies     Recent Labs   Lab Test 12/01/22  1112 11/15/22  1059 10/18/22  1412 04/06/22  0947 11/18/21  1612 11/09/21  0931   TSH 0.08*  --  0.03* 0.88 0.23* 0.34*   T4 2.09* 1.37 1.51  --  1.33 1.28     Urine Studies     Recent Labs   Lab Test 10/18/22  1546 09/12/21  1219 07/08/21  1045   URINEPH 7.0 5.5 5.0   NITRITE Negative Negative Negative   LEUKEST Negative Negative Negative   WBCU 1 1 <1       CSF testing     Recent Labs   Lab Test 07/14/21  1337   CWBC 1   CRBC 6*   CGLU 56   CTP 46       Body fluid stats    Recent Labs   Lab Test 04/29/22  1129   FCOL Colorless   FAPR Clear   FWBC 176   FNEU 2   FLYM 8       Microbiology:  Fungal testing  Recent Labs   Lab Test 04/29/22  1129 04/22/22  0845   FGTL  --  <31   ASPGAI 0.14 0.07   ASPAG Negative  --    ASPGAA  --  Negative       Culture   Date Value Ref Range Status   04/29/2022 No Growth  Final   04/29/2022 No Growth  Final   04/29/2022 1+ Normal tono  Final   09/12/2021 No Growth  Final   08/17/2021   Final    No Vancomycin Resistant Enterococcus species isolated     Virology:  Coronavirus-19 testing    Recent Labs   Lab Test 12/01/22  1113 11/29/22  1039 06/07/22  0953 05/26/22  1259 07/12/21  1423 07/08/21  1045 06/25/21  1400 06/01/21  1201 05/08/21  1011   [id number] Negative Negative Positive* Positive*   < > Test received-See reflex to IDDL test SARS CoV2 (COVID-19) Virus  RT-PCR  NEGATIVE  --   --   --    [id number]  --   --   --   --   --  Nasopharyngeal  --   --   --    [id number]  --   --   --   --   --  Nasopharyngeal  --   --   --    COVIDPCREXT  --   --   --   --   --   --  Negative Negative Negative   CYCLETHRES  --   --  37.4  --   --   --   --   --   --     < > = values in this interval not displayed.       Respiratory virus testing    Recent Labs   Lab Test 12/01/22  1113 05/18/22  1228 01/15/22  1100 07/08/21  1045   IFLUA  --  Not Detected  --   --    INFZA Positive*  --    < >  --    FLUAH1  --  Not Detected  --   --    UM0604  --  Not Detected  --   --    FLUAH3  --  Not Detected  --   --    IFLUB  --  Not Detected  --   --    INFZB Negative  --    < >  --    PIV1  --  Not Detected  --   --    PIV2  --  Not Detected  --   --    PIV3  --  Not Detected  --   --    PIV4  --  Not Detected  --   --    IRSV Negative  --    < >  --    RSVA  --  Not Detected  --   --    RSVB  --  Not Detected  --   --    HMPV  --  Not Detected  --   --    ADENOV  --  Not Detected  --   --    CORONA  --  Not Detected  --   --    [id number]  --   --   --  Nasopharyngeal    < > = values in this interval not displayed.       CMV Antibody IgG   Date Value Ref Range Status   07/08/2021 >8.0 (H) 0.0 - 0.8 AI Final     Comment:     Positive  Antibody index (AI) values reflect qualitative changes in antibody   concentration that cannot be directly associated with clinical condition or   disease state.       EBV Capsid Antibody IgG   Date Value Ref Range Status   07/08/2021 7.5 (H) 0.0 - 0.8 AI Final     Comment:     Positive, suggests recent or past exposure  Antibody index (AI) values reflect qualitative changes in antibody   concentration that cannot be directly associated with clinical condition or   disease state.       Herpes Simplex Virus Type 1 IgG   Date Value Ref Range Status   07/08/2021 >8.0 (H) 0.0 - 0.8 AI Final     Comment:     Positive.  IgG antibody to HSV-1 detected.  Antibody index  (AI) values reflect qualitative changes in antibody   concentration that cannot be directly associated with clinical condition or   disease state.       Herpes Simplex Virus Type 2 IgG   Date Value Ref Range Status   07/08/2021 <0.2 0.0 - 0.8 AI Final     Comment:     No HSV-2 IgG antibodies detected.  Antibody index (AI) values reflect qualitative changes in antibody   concentration that cannot be directly associated with clinical condition or   disease state.         CMV viral loads    Recent Labs   Lab Test 11/22/22  0817 11/08/22  1446 10/18/22  1412 10/11/22  1519 09/20/22  1509 09/06/22  1309 08/18/22  1126 07/13/22  1107 06/21/22  1555 04/28/22  1408 04/20/22  0833 04/12/22  0951 04/06/22  0947 03/31/22  1550   CMVQNT Not Detected <137* Not Detected Not Detected Not Detected Not Detected Not Detected Not Detected Not Detected   < > <137*  --   --   --    CMVRESINST  --   --   --   --   --   --   --   --   --   --   --  1,041* 779* 291*   CMVLOG  --  <2.1  --   --   --   --   --   --   --   --  <2.1 3.0 2.9 2.5    < > = values in this interval not displayed.       EBV DNA Copies/mL   Date Value Ref Range Status   11/08/2022 Not Detected Not Detected copies/mL Final   10/11/2022 Not Detected Not Detected copies/mL Final   07/13/2022 Not Detected Not Detected copies/mL Final   06/07/2022 Not Detected Not Detected copies/mL Final   05/10/2022 Not Detected Not Detected copies/mL Final   05/04/2022 Not Detected Not Detected copies/mL Final     Adenovirus Testing    Recent Labs   Lab Test 02/21/22  1214   ADRES Not Detected     Hepatitis B Testing     Recent Labs   Lab Test 02/21/22  1214 07/08/21  1055   HBCAB Nonreactive Nonreactive   HEPBANG Nonreactive Nonreactive        Hepatitis C Antibody   Date Value Ref Range Status   02/21/2022 Nonreactive Nonreactive Final   07/08/2021 Nonreactive NR^Nonreactive Final     Comment:     Assay performance characteristics have not been established for newborns,   infants, and  children         Imaging:  Recent Results (from the past 48 hour(s))   CT Chest Pulmonary Embolism w Contrast    Narrative    EXAMINATION: CTA pulmonary angiogram, 12/1/2022 1:03 PM     COMPARISON: Chest CT 8/16/2022    HISTORY: shortness of breath, hx PE (also new meds), ? PE or other  cause for shortness of breath    TECHNIQUE: Volumetric helical acquisition of CT images of the chest  from the lung apices to the kidneys were acquired after the  administration of 80 mL of Isovue-370 IV contrast. .  Post-processed  multiplanar and/or MIP reformations were obtained, archived to PACS  and used in interpretation of this study.     FINDINGS:  Contrast bolus is: adequate.  Exam is negative   for acute pulmonary embolism.    Remainder of the mediastinum: Right chest port catheter terminates in  the low SVC. No cardiomegaly. No pericardial effusion. Normal caliber  thoracic aorta and main pulmonary trunk. No suspicious lymphadenopathy  in the chest. Thoracic esophagus with normal limits.    Lungs: The central airways patent. Left greater than right bibasilar  tree-in-bud nodularity. Patchy and nodular groundglass opacities in  the superior left lower lobe and left upper lobe some of which contain  small areas of central cavitation. No effusions or pneumothorax.  Minimal apical paraseptal emphysema.    Upper abdomen: Evaluation of the upper abdomen is limited. Partially  seen right nephrectomy changes. Partially seen simple cystic density  in the left kidney. Nodular hepatic contour.    Bones/soft tissues: No destructive bony lesions. Soft tissues  unremarkable.      Impression    IMPRESSION:   1. Exam is negative for acute pulmonary embolism.    2. Left greater than right basilar predominant tree-in-bud nodularity  and a few centrally cavitating nodules in the left lung, suspicious  for atypical infection.    I have personally reviewed the examination and initial interpretation  and I agree with the findings.    HALEY SAWANT  DO MARLYN         SYSTEM ID:  [id number]   Echo Limited   Result Value    LVEF  55-60%    Located within [facility]    [id number]  [id number]  [id number]  [id number]^[email or web address]^ADALBERTO^KHLOE     [facility],[place]  Echocardiography Laboratory  [address] [place] [id number]     Name: CONNER MCKEON  MRN: [id number]  : 1960  Study Date: 2022 11:24 AM  Age: 62 yrs  Gender: Male  Patient Location: [facility]  Reason For Study: Orthopnea  Ordering Physician: ADALBERTO MEMBRENO  Performed By: Florencia Galeana     BSA: 2.3 m2  Height: 73 in  Weight: 245 lb  HR: 74  BP: 128/78 mmHg  ______________________________________________________________________________  Procedure  Limited Portable Echo Adult. Technically difficult study.  ______________________________________________________________________________  Interpretation Summary  Global and regional left ventricular function is normal with an EF of 55-60%.  The right ventricle is normal size.  Global right ventricular function is normal.  Pulmonary artery systolic pressure cannot be assessed.  IVC diameter <2.1 cm collapsing >50% with sniff suggests a normal RA pressure  of 3 mmHg.  No pericardial effusion is present.  ______________________________________________________________________________  Left Ventricle  Global and regional left ventricular function is normal with an EF of 55-60%.     Right Ventricle  The right ventricle is normal size. Global right ventricular function is  normal.     Aortic Valve  The valve leaflets are not well visualized. On Doppler interrogation, there is  no significant stenosis or regurgitation.     Tricuspid Valve  The tricuspid valve is normal. Trace tricuspid insufficiency is present.  Pulmonary artery systolic pressure cannot be assessed.     Vessels  IVC diameter <2.1 cm collapsing >50% with sniff suggests a normal RA pressure  of 3 mmHg.     Pericardium  No pericardial effusion is present.     Compared to Previous Study  This  study was compared with the study from 10/14/2022 . No significant  changes noted.  ______________________________________________________________________________  Doppler Measurements & Calculations  MV E max kristin: 39.0 cm/sec  MV A max kristin: 62.1 cm/sec  MV E/A: 0.63  MV dec time: 0.15 sec  PA acc time: 0.14 sec  E/E' av.4  Lateral E/e': 4.5  Medial E/e': 6.2     ______________________________________________________________________________  Report approved by: MD Gerardo Olivarez 2022 12:12 PM

## 2022-12-02 NOTE — PROGRESS NOTES
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    Admission date: 12/1/2022    INTERVAL  HISTORY     Doing ok this morning. Mostly w/ dyspnea on exertion and orthopnea. Wife thinks fatigue started to worsen when he started belumosudil about 6 weeks ago. Has been tapering prednisone, on 15mg daily. Denies any fevers. Coughing but seems to be mostly dry.     Review of Systems: 10 point ROS negative except as noted above.      PHYSICAL EXAM     Weight In/Out     Wt Readings from Last 3 Encounters:   12/01/22 111.1 kg (245 lb)   11/29/22 113.4 kg (249 lb 14.4 oz)   11/22/22 117.8 kg (259 lb 9.6 oz)      No intake/output data recorded.       KPS:  80    /78 (BP Location: Left arm)   Pulse 77   Temp 98  F (36.7  C)   Resp 16   Ht 1.854 m (6' 1\")   Wt 111.1 kg (245 lb)   SpO2 97%   BMI 32.32 kg/m       General: NAD, appears tired  Eyes: : YARELIS, sclera anicteric, eyes dry  Lungs: Crackles to L lung fields, normal respiratory effort  Cardiovascular: RRR, no M/R/G   Abdominal/Rectal: +BS, soft, NT, ND, No HSM   Lymphatics: no edema  Skin: ecchymosis to forearm and hand w/ healed scab  Neuro: A&O   Musculoskeletal: muscle mass adequate  Additional Findings: port-a-cath C/D/I.    cGVHD therapy started on February 24 2022  - Baseline NIH scoring 2/24/2022 : skin 2 maculopapular rash/erythema with no sclerotic features, mouth score 1 mild symptoms with lichenoid changes less than 25%, eyes score 2 moderate symptoms without new visual impairments due to KCS requiring lubricant eyedrops more than 3 times a day, overall mild scale for her provider  - 3/25/2022 1 month NIH treatment assessment on PQRST: skin 2, mouth score 1 mild symptoms with NO lichenoid changes, eyes score 2 moderate symptoms w requiring lubricant eyedrops more " than 3 times a day, liver score 1 ALT 3.7x ULN and nl bilirubin   - 3/31  Mouth clear; eyes minimal LFT still abn; no joint or new GI sx  - 4/28 Mouth clear, Left>R eye is mild sclera erythema, lids are pink and irritated appearing, LFT's slow improvement, no new joint, skin, or GI symptoms.   -5/11 mouth with mild erythema but no lichenoid changes, eyes using eyedrops 4-6 times a day score of 2, LFTs significantly improved from baseline slight elevation in alk phos and AST with normal bilirubin, skin with hyperpigmentation from old acute GVHD no active skin rashes, no GI symptoms no musculoskeletal complaints.  -5/26 Mouth with very slight lichenoid changes, erythema.  Eyes still using eyedrops 4-6x per day.  LFTs essentially normal with slight elevation in alk phos.  Skin with hyperpigmentation from old acute GVHD, no active rashes, no GI or MSK concerns.  Skin 0, mouth 0 but with lichenoid changes, eyes 2  -6/7/22 Mouth with no lichenoid changes, erythema.  Eyes still using eyedrops 4-6x per day.  LFTs essentially normal with slight elevation in alk phos.  Skin with hyperpigmentation from old acute GVHD, no active rashes, no GI or MSK concerns.  Skin 0, mouth 0, eyes 2     -6 month PQRST assessment 9/6/2022: eyes 3, mouth 0 for symptoms, 25% lichenoid changes (1), liver/GI/skin 0     12/2/22: cGVHD NIG scoring eyes 3, no other organ involvement clinically    LABS AND IMAGING: I have assessed all abnormal lab values for their clinical significance and any values considered clinically significant have been addressed in the assessment and plan.        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 S/p allo sib PBSCT. (ABO matched)  HCT-CI score: 3 (prior solid tumor)  Day +100 bone marrow biopsy is 100% donor with no morphologic or flow cytometric evidence of leukemia BCR abl is pending.  - Day +180 restaging BM bx- still in CR  100% donor;  2/16/22 BCR ABL major breakpoint undetectable.   - 1 year restaging 8/18/2022: Markedly hypocellular bone marrow [less than 10% cellular] with maturing trilineage hematopoiesis and no definite morphologic or immunophenotypic evidence for involvement by B lymphoblastic leukemia. BCRABL1 PCR not detected, bone marrow % donor. FISH NORMAL No evidence of BCR-ABL1 fusion  - Maintenance with TKI posttransplantation given his Ph positive ALL that have not been started previously presumed secondary to multiple active issues specifically infections and COVID.  Per Avila Tobar: will reconsider this patient will think about whether this is something he would like to consider he was previously on Dasatinib, we would start on a low dose and increase as tolerated.  This is on hold now pending active GVHD therapy, we discussed on this visit 10/18 in agreement with holding off.    HEME/COAG  #Secondary iron overload: per outpatient team plan is for phlebotomies 250ml every 4 weeks as tolerated (as long as Hgb >11)     Transfusion parameters: hemoglobin <7, platelets <10 - caution with RBC transfusions in setting of iron overload, may not be needed given current Hgb is 12.1    Relevant thrombosis or bleeding history: PE in setting of PEG asparaginase.  S/p Xarelto.    IMMUNOCOMPROMISED  #Influenza A: positive test on 11/23, s/p course of Tamiflu. Tested positive again on admission. Droplet precautions, consult ID.     #Pulmonary infiltrates:   4/20 CT chest with new RUL infiltrate. Highly immunocompromised. 4/22 Fungitell/asp GM were neg. BAL 4/29 NGTD, increased micafungin to 150mg IV daily-- f/u 6/1 Dr Garcia CT with mixed results, followed with Dr. Garcia August 2022 with resolution of pulmonary nodules and normalization of LFTs switched to posaconazole prophylactic dose and monitor LFTs and any infectious concerns closely (wnl stable 10/14)  -Chest CT done on admission due to worsening shortness of breath, showed L > R basilar predominant tree-in-bud nodularity and a few centrally cavitating nodules in L lung suspicious for atypical infection. On azithroymcin and cefepime. Pulmonary and ID consulted.     #History of CMV viremia:    - CMV viremia up to 1100. 4/15 started valcyte 900mg PO BID. 4/20 CMV <137, 5/10 ND, decreased to 450mg BID 4/30 - continue 4 weeks as long as CMV <137 till 5/28 + weekly CMV  - Switch to acyclovir after completion of current therapy 5/28. 10/11 CMV ND. Check monthly on IST, 11/8 CMV <137, 11/22 ND, recheck 12/8      Vaccination status: Influenza vaccination after day +60, COVID vaccination after day +100, followed by vaccinations at 12, 14, 24, and 26 months.    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   #Skin GVHD- history of biopsy proven GVHD of the skin 8/30/2021; resolved.   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 WNL today 11/22  # Ocular GVHD: follows with ophthalmology. Maxitrol to lids, continue refreshing drops QID. Score 3  Followed closely by Dr. Juarez and had ocular fittings 11/8. Had contact lens which was helpful, but unfortunately it broke - patient's  wife has contacted eye clinic to get replacement.  # Oral GVHD: dex s/s three-4 times a day; tac ointment to lips as needed.  Clotrimazole cream added after seeing dermatology.  Lichenoid changes have resolved with no other ulcers since 11/8     Previous therapies  Tacrolimus-previously decided to stay on same dose, no checks of levels if clinically stable, and no need switch to sirolimus. (keep tac low as gvhd is quiet and viremia). 5/10 level 45 with starting of COVID therapy and D-D intractions (Paxlovid for COVID19, which has a drug interaction with tacrolimus-Ritonavir increases serum levels of tacrolimus).   Tacrolimus level subtherapeutic, increase to 2.5 mg twice daily recently, 8/23/2022 instructed to change tacrolimus to 2 mg twice daily. 9/6 with mild NEGRA, hyperkalemia, hypomagnesemia, and reports some tremors and cramps, suspect tacrolimus to be high therefore empirically decreased to 1.5 twice daily, skip morning dose of tacrolimus and took 2 mg today after his afternoon labs. Decrease to 1mg BID on Saturday.  Sirolimus  9/21 despite fluids and a dose adjustment of tacrolimus patient seem to have persistence NORBERTO related NEGAR, therefore after discussion with the patient decision was made to transition to sirolimus that was started on 9/21, patient initially seem to tolerate this well with normalization of kidney function  10/11 presented with flares of ocular and oral GVHD, fluid retention and gain of 5-6 kg, lower extremity edema, abdominal distention, total protein to creatinine ratio elevated at 0.4, in addition to elevation in BUN and creatinine, HTN.  Picture most consistent with sirolimus related side effects.  Less likely that the patient will tolerate even a lower level of sirolimus.  Therefore the patient was instructed to stop sirolimus, last dose on 10/10. 10/14 level 3.5 appropriately trending down.     Corticosteroids  3/1-3/16: prednisone 100mg every day  3/17 75mg every day   3/31 75 alt  with 50  4/6: 75 alt with 25mg every other day  4/12 pred tapered to 60 alternating with 25mg   4/15 In setting of viruses trying to reactivate,GVHD stable: Taper 60/15mg alternating.  4/20 decrease pred further to 50/10.    4/25 taper pred to 50/0 every other day as long as symptoms stable.   5/2 Pred decrease 40/0 alternating every other day.   5/13 decrease to 30/0  5/20 decrease to 20/0 then slowly by 5 mg weekly  6/7 decrease to 10/0  Went up to 15/0 with mild increased LFTs  8/23/2022 increased to 20/0, with persistence of oral ulcers and active ocular GVHD.  Discussed with the patient the importance of stopping chewing of nicotine gums for prolonged hours as that would not help with the healing of the oral ulcers.  I discussed with the patient alternatives of nicotine patches that he will reconsider and let me know.  9/6 decreased to 15 alternating with 0  9/13 decreased to 10 alternating with 0  9/21 discontinued tacrolimus given persistent NEGRA and single kidney and start the patient on sirolimus without loading dose.  We will get a list of possible side effects and adverse events from the Pharm.D. see sirolimus section above.  10/11 GVHD flare. Sirolimus stopped. Resume prednisone 40 mg daily as of 10/12, no change with mildly worsening eye pain 10/14  Reduce pred to 35mg 10/25 and 25mg 11/1 11/8 20 mg   11/22 15 mg - continued during admission     Belumosudil  Patient intolerant/with disease flares on tacrolimus and sirolimus see above.  Discussed with the patient the different options for therapy of chronic GVHD that are FDA approved.  Given the side effect profiles after discussing in detail and given the least nephrotoxicity and expected response, taking into account other metabolic effect as well.  We will proceed with prior authorization with belumosudil.  Patient was given material to review for possible expected adverse events and side effects with both Belumosodil and ruxolitinib.   -- Started on  10/18 in p.m. Will continue during admission    CARDIOVASCULAR  #Hypertension: Amlodipine 2.5mg daily    RESPIRATORY  #Shortness of breath  #Orthopnea  - CT as above. No PE. Obtain lower extremity ultrasounds and echocardiogram to complete the workup.  #Pulmonary infiltrates: see ID section    #Smoking: Nicorette PRN    GI/NUTRITION    Ulcer prophylaxis: Protonix    Risk of malnutrition: RD consulted    RENAL/ELECTROLYTES/  #CKD: Recent baseline 1.2-1.4. Hx RCC s/p R nephrectomy in 2007. Creatinine stable on admission.  #Hyponatremia: Decreased PO intake, also on chlorthalidone recently - hold chlorthalidone. Continue to monitor.     Electrolyte management per protocol    DIABETES/ENDOCRINE  #Graves' disease: Continue Synthroid.    MUSCULOSKELETAL/FRAILTY    Baseline Frailty Score:     Patient with substantial risk of sarcopenia    Daily PT/OT as needed while inpatient    Cancer Rehab as needed outpatient    SYMPTOM MANAGEMENT    Anxiety: PRN lorazepam at bedtime  # Pain Assessment:  Current Pain Score 12/2/2022   Patient currently in pain? denies   Pain score (0-10) -   Nik lowe pain level was assessed and he currently denies pain.          SOCIAL DETERMINANTS    Caregiver: Wife Lamar    Financial/insurance concerns: Not discussed    Known issues that I take into account for medical decisions, with salient changes to the plan considering these complexities noted above.    Patient Active Problem List   Diagnosis     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)     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)     Status post bone marrow transplant (H)     Musa-Barr virus viremia     Generalized muscle weakness     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)     Shortness of breath     Influenza A       I spent 45 minutes in the care of this patient today, which included time necessary for preparation for the visit, obtaining history, ordering medications/tests/procedures as medically indicated,  review of pertinent medical literature, counseling of the patient, communication of recommendations to the care team, and documentation time.    Cinthya Monae PA-C  [phone]      ______________________________________________      BMT ATTENDING NOTE    Nik Nava is a 62 year old male, admitted on 12/1/2022, who remains hospitalized pending workup for shortness of breath.  He is currently D+479 post allogeneic BMT for Ph+ ALL after VER[facility] MRD PBSCT.  He has known cGHVD of the skin, liver, and mouth.   He has noted worsening shortness of breath since starting belumosudil, which can cause shortness of breath in up to 33% of patients.  He also notes LE swelling and orthopnea.  Echo for orthopnea evaluation and LE US for DVTs given asymmetry R>L.  ID and pulmonary consult given his complex history with worsening tree-in-bud noted on admission CT and is only on ppx posaconazole at this time.  Influenza+ despite completing course of tamiflu, but doing better overall.  Defer additional tamiflu at this time per ID recs.  Will consider switch to alternative medication such as ruxolitinib for cGVHD as it is likely belumosudil as an etiology for his shortness of breath.     I spent 45 minutes in the care of Nik today, including an independent face-to-face assessment, review of vitals, medications, laboratory results, and independent review of imaging studies. I personally performed all of the medical decision making associated with this visit, and I edited the above note to reflect my current plan of care. I spent over 50% of my time counseling the patient/family today and coordinating care with the team.    Key management decisions made by me and carried out under my direction include:   -Pulm consult  -ID consult.   -Continue belumosudil at home dose on admit.  Will discuss alternatives with pt and team.   -Echo.   -LE DVT US.     Counseling and/or coordination of care performed by me:    -Possible etiologies for  shortness of breath.   -Infectious concerns pending consult.     Ranges for vital signs:    Temp:  [97.8  F (36.6  C)-98.2  F (36.8  C)] 97.8  F (36.6  C)  Pulse:  [74-83] 74  Resp:  [16] 16  BP: (104-137)/(72-92) 117/72  SpO2:  [92 %-97 %] 95 %    Infusions:      Scheduled Medications:    acyclovir  800 mg Oral BID     amLODIPine  2.5 mg Oral Daily     [START ON 12/3/2022] azithromycin  500 mg Oral Daily     carboxymethylcellulose PF  1 drop Both Eyes 4x Daily     dexamethasone alcohol-free  2 mg Swish & Spit 4x Daily     erythromycin  0.5 inch Both Eyes At Bedtime     escitalopram  10 mg Oral Daily     fluorometholone  1 drop Both Eyes BID     levothyroxine  150 mcg Oral Daily     magnesium oxide  800 mg Oral BID     pantoprazole  40 mg Oral BID     posaconazole  300 mg Oral QAM     predniSONE  15 mg Oral QAM     rosuvastatin  5 mg Oral Daily     traZODone  50 mg Oral At Bedtime         Joao Daniel MD, PhD  BMT/Cellular Therapy/ Oncology       Securely message with the Vocera Web Console

## 2022-12-02 NOTE — CONSULTS
[facility]   Pulmonary   Consult Note 12/2/2022  Nik Nava MRN: [id number]    We were consulted for evaluation of dyspnea and abnormal CT imaging in the setting of immunocompromised host.     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    Unclear etiology to his current presentation but imaging on CT and risk factors including immunosuppression in a BMT patient are suggestive of an atypical bacterial or fungal infectious process. Clinically, he appears to be very stable at this point. Favor non-invasive infectious workup as directed by our Infectious Diseases colleagues. Bronchoscopy not indicated at this time but would re-consider if clinical worsening over the next couple of days. Of note, belumosudil has been associated with dyspnea in up to 33% of patients (per UpToDate review). Will defer to BMT service if adverse effects of this medication are contributing to his current clinical situation.    Recommendations:    - no bronchoscopy at this point, ok to eat from our perspective   - infectious workup, antimicrobials per ID              - Pulmonary hygiene                          - incentive spirometer                          - flutter valve                          - out of bed to chair                          - ambulation    We will continue to follow.    Patient seen & discussed w/  Dr. Muñoz, who agrees with the above assessment and plan.    Kelvin Mendoza M.D.  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    Progressive shortness of breath and fatigue over the last several weeks. Started belumosudil for GVHD around that time and had been on a prednisone taper (currently at 15 mg daily). Endorses chronic cough without significant sputum production. Tested positive for influenza on 11/23/2022 and subsequently took Tamiflu with temporary improvement in his symptoms. Given his persistent symptoms, he came to the ED for evaluation.    ED evaluation notable for normal WBC 9.9 with hemoglobin 12.1 (around baseline), creatinine 1.24 (around baseline), procalcitonin 0.18, NT-Pro BNP 33, VBG 7.42 / 38 / 26 / 25. Influenza A positive on PCR; SARS-CoV2 and RSV negative. Of note, tested positive for COVID-19 back in June 2022. CTA chest negative for acute pulmonary emboli but notable for left greater than right basilar predominant tree-in-bud nodularity with a few centrally cavitating nodules in left lung (suspicious for atypical infection).    Currently, he is hemodynamically stable and afebrile with adequate oxygenation on room air. Being empirically treated with cefepime. Infectious Diseases has been consulted.          Review of Symptoms:   10-point ROS reviewed, & found negative w/ exceptions noted in the HPI.          Past Medical History:     Past Medical History:   Diagnosis Date     Cancer (H)     renal cell     CKD (chronic kidney disease)      Thyroid disease        Past Surgical History:   Procedure Laterality Date     BACK SURGERY  2017     BONE MARROW BIOPSY,  BONE SPECIMEN, NEEDLE/TROCAR Left 9/2/2021    Procedure: BIOPSY, BONE MARROW;  Surgeon: Jailyn Ny APRN CNP;  Location: [facility] OR     BONE MARROW BIOPSY, BONE SPECIMEN, NEEDLE/TROCAR Left 11/15/2021    Procedure: BIOPSY, BONE MARROW;  Surgeon: Socrates De La Torre;  Location: [facility] OR     BONE MARROW BIOPSY, BONE SPECIMEN, NEEDLE/TROCAR Left 2/7/2022    Procedure: BIOPSY, BONE MARROW;  Surgeon: Renetta Wiggins PA-C;  Location: [facility] OR     BONE MARROW BIOPSY, BONE SPECIMEN, NEEDLE/TROCAR Left 8/18/2022    Procedure: BIOPSY, BONE MARROW;  Surgeon: Lorrie Yap PA-C;  Location: [facility] OR     BRONCHOSCOPY (RIGID OR FLEXIBLE), DIAGNOSTIC N/A 4/29/2022    Procedure: BRONCHOSCOPY, WITH BRONCHOALVEOLAR LAVAGE;  Surgeon: Murtaza Garcia MD;  Location: [facility] GI     IR CVC TUNNEL PLACEMENT > 5 YRS OF AGE  8/4/2021     IR CVC TUNNEL REMOVAL LEFT  9/2/2021     IR LIVER BIOPSY PERCUTANEOUS  2/21/2022     PERCUTANEOUS BIOPSY LIVER N/A 2/21/2022    Procedure: NEEDLE BIOPSY, LIVER, PERCUTANEOUS;  Surgeon: Trace Castellanos MD;  Location: [facility] OR            Allergies:     Allergies   Allergen Reactions     Sulfamethoxazole W/Trimethoprim Rash             Outpatient Medications:     No current facility-administered medications on file prior to encounter.  acyclovir (ZOVIRAX) 800 MG tablet, Take 1 tablet (800 mg) by mouth 2 times daily  amLODIPine (NORVASC) 2.5 MG tablet, Take 1 tablet (2.5 mg) by mouth daily  azithromycin (ZITHROMAX) 250 MG tablet, Take 1 tablet (250 mg) by mouth daily  Belumosudil Mesylate 200 MG TABS, Take 200 mg by mouth daily  Belumosudil Mesylate 200 MG TABS, Take 200 mg by mouth 2 times daily  Carboxymethylcell-Glycerin PF (REFRESH RELIEVA PF) 0.5-1 % SOLN, Apply 1 drop to eye 4 times daily Preservative free. Either PF multidose bottle or PF vials  carboxymethylcellulose PF (CARBOXYMETHYLCELLULOSE SODIUM) 0.5 % ophthalmic solution, Place 1 drop into both eyes 4 times daily  chlorthalidone  (HYGROTON) 25 MG tablet, Take 0.5 tablets (12.5 mg) by mouth daily  clobetasol (TEMOVATE) 0.05 % external ointment, Apply twice daily as needed for sore in the mouth  dexamethasone alcohol-free (DECADRON) 0.1 MG/ML solution, Swish and spit 20 mLs (2 mg) in mouth 4 times daily  doxycycline hyclate (VIBRA-TABS) 100 MG tablet, Take 1 tablet (100 mg) by mouth 2 times daily  erythromycin (ROMYCIN) 5 MG/GM ophthalmic ointment, Place 0.5 inches into both eyes At Bedtime  escitalopram (LEXAPRO) 10 MG tablet, Take 1 tablet (10 mg) by mouth daily  fluorometholone (FML LIQUIFILM) 0.1 % ophthalmic suspension, Place 1 drop into both eyes 2 times daily For total of 21 days then stop  levothyroxine (SYNTHROID/LEVOTHROID) 150 MCG tablet, Take 1 tablet (150 mcg) by mouth daily  LORazepam (ATIVAN) 1 MG tablet, Take 1 tablet (1 mg) by mouth nightly as needed for anxiety or sleep  magnesium oxide (MAG-OX) 400 MG tablet, Take 2 tablets (800 mg) by mouth 2 times daily  melatonin 5 MG CAPS, Take 5 mg by mouth At Bedtime   nicotine polacrilex (NICORETTE) 4 MG gum, Take 4 mg by mouth as needed for smoking cessation   omeprazole (PRILOSEC) 40 MG DR capsule, Take 1 capsule (40 mg) by mouth daily  posaconazole (NOXAFIL) 100 MG EC tablet, Take 3 tablets (300 mg) by mouth every morning  predniSONE (DELTASONE) 10 MG tablet, Current dose is 30mg/day (x 10/25)  predniSONE (DELTASONE) 20 MG tablet, Current dose is 30mg/day (x 10/25)  predniSONE (DELTASONE) 5 MG tablet, Current dose is 30mg/day (x 10/25)  rosuvastatin (CRESTOR) 5 MG tablet, Take 1 tablet (5 mg) by mouth daily  sennosides (SENOKOT) 8.6 MG tablet, Take 1 tablet by mouth 3 times daily as needed for constipation  sodium chloride 0.9 % neb solution, 3 mLs by Other route 2 times daily  traZODone (DESYREL) 50 MG tablet, Take 1 tablet (50 mg) by mouth At Bedtime              Family History:     Family History   Problem Relation Age of Onset     Lung Cancer Mother      Polycythemia Father   "    Coronary Artery Disease Maternal Grandmother                Social History:     Social History     Tobacco Use     Smoking status: Former     Packs/day: 2.00     Years: 30.00     Pack years: 60.00     Types: Cigarettes     Quit date: 5/4/2019     Years since quitting: 3.5     Smokeless tobacco: Never   Substance Use Topics     Alcohol use: Not Currently     Drug use: Never             Physical Exam:   /78 (BP Location: Left arm)   Pulse 77   Temp 98  F (36.7  C)   Resp 16   Ht 1.854 m (6' 1\")   Wt 111.1 kg (245 lb)   SpO2 97%   BMI 32.32 kg/m      General: alert, interactive, resting comfortably in bed  HENT: sclera anicteric, oropharynx clear  Lungs: clear to auscultation bilaterally, no wheezing  Heart: regular rate and rhythm, no murmurs  Abdomen: soft, nontender, nondistended  Extremities: no lower extremity edema, no obvious deformities  Skin: no concerning rashes or lesions  Neurologic: alert, interactive, answers questions appropriately, nonfocal exam          Data:   Labs (all laboratory studies reviewed by me): notable labs in HPI above.    Imaging and other diagnostic testing (all imaging studies reviewed by me)    CTA chest 12/2/2022:  IMPRESSION:   1. Exam is negative for acute pulmonary embolism.  2. Left greater than right basilar predominant tree-in-bud nodularity and a few centrally cavitating nodules in the left lung, suspicious for atypical infection.    Chest xray 11/29/2022:  IMPRESSION:   Stable right chest Port-A-Cath. Otherwise negative chest.    Transthoracic echocardiogram 10/14/2022:  Interpretation Summary  - Global and regional left ventricular function is normal with an EF of 55-60%.  - Right ventricular function, chamber size, wall motion, and thickness are  normal.  - The inferior vena cava is normal.  - No pericardial effusion is present.    ===================================================================    Attending Addendum    Thank you for consulting the " general pulmonary service and allowing us to be a part of this patient's care.     I saw the patient with the fellow/resident and agree with the findings and the plan of care as documented in the fellow's/resident s note.  To reiterate, immunocompromised patient with new dyspnea symptoms and CT findings of tree-in-bud in locations in the left lung and nodularity with faint cavitation seen. Infectious work up including sputum acterial culture, sputum fungal culture, AFB sputum culture, and fungal serologies.  Empiric antibiotics with cefepime should be continued along with atypical coverage with azithromycin or doxycycline.     Young Muñoz MD  , Division of Pulmonary/CCM  December 2, 2022

## 2022-12-02 NOTE — UTILIZATION REVIEW
Admission Status; Secondary Review Determination     Admission Date: 12/1/2022 10:48 AM       Under the authority of the Utilization Management Committee, the utilization review process indicated a secondary review on the above patient.  The review outcome is based on review of the medical records, discussions with staff, and applying clinical experience noted on the date of the review.        (x)      Inpatient Status Appropriate - This patient's medical care is consistent with medical management for inpatient care and reasonable inpatient medical practice.       RATIONALE FOR DETERMINATION      Brief clinical presentation, information copied from the chart, abbreviated and edited for relevant content:     Patient high risk, agree with IP.         Nik Nava is a 62 year old male admitted on 12/1/2022. 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 Patient has had a prolonged taper of his prednisone with new addition of belumosudil approximately 6 weeks ago. He has notable GVHD with recent flare.CT imaging with out acute pulmonary embolism but does show predominant tree-in-bud nodularities with some centrally cavitating nodules in the left lung with suspicion for atypical infection.  Of note he was on azithromycin and doxycycline prior to admission.  Discussed with BMT team and plan to admit for potential primary PNA versus secondary infection post influenza (currently still positive, has already received Tamiflu). Broadening to IV cefepime with azithromycin. Will also get pulmonology involved for determination of possible intervention (bronc).    Plan to Follow sputum sample, MRSA swab. Follow procalcitonin. Will not be discharging today.        At the time of admission with the information  available to the attending physician, more than 2 nights hospital complex care was anticipated. Also, there was a risk of adverse outcome if patient was treated outpatient or observation. High intensity of services anticipated. Inpatient admission appropriate based on Medicare guidelines.       The information on this document is developed by the utilization review team in order for the business office to ensure compliance.  This only denotes the appropriateness of proper admission status and does not reflect the quality of care rendered.         The definitions of Inpatient Status and Observation Status used in making the determination above are those provided in the CMS Coverage Manual, Chapter 1 and Chapter 6, section 70.4.      Sincerely,      Shannan Chou MD   Utilization Review/ Case Management  [facility].

## 2022-12-02 NOTE — H&P
[facility]    History and Physical - Hospitalist Service       Date of Admission:  12/1/2022    Assessment & Plan      Nik Nava is a 62 year old male admitted on 12/1/2022. 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 Patient has had a prolonged taper of his prednisone with new addition of belumosudil approximately 6 weeks ago.  Of note he also had an addition of hydrochlorothiazide around the same time.  Since that time he has had worsening shortness of breath and fatigue. He has notable GVHD with recent flare.    Labs here notable for sodium 131, creatinine 1.24, hemoglobin of 12.1, and platelets of 101.  His white blood count is 9.9.  CT imaging with out acute pulmonary embolism but does show predominant tree-in-bud nodularities with some centrally cavitating nodules in the left lung with suspicion for atypical infection.  Of note he was on azithromycin and doxycycline (cat scratch) prior to admission.  Discussed with BMT team and plan to admit for potential primary PNA versus secondary infection post influenza (currently still positive, has already received Tamiflu). Broadening to cefepime with azithromycin. Will also get pulmonology involved for determination of possible intervention ([facility]).    -Admit to inpatient (5B given influenza status)  -Continue Cefepime and azithromycin  -Follow sputum sample  -Follow MRSA swab  -Consult ID  -Consult pulmonology  -Continue prednisone 15 mg daily  -Belumosudil per BMT team in AM  -Follow procalcitonin  -Continue acyclovir and posaconazole  -Recheck CBC with  differential and CMP in a.m.  -Continue glycerin, refresh drops  -Continue erythromycin ointment and fluorometholone drops  -PT/OT    Mild hyponatremia  Suspect in the setting of decreased p.o. intake and thiazide use.  Will monitor with a.m. lab.  -Follow-up BMP in AM  -Hold chlorthalidone for the time being    History of PE  Patient with PE and completed Xarelto.  Not currently on anticoagulation.  No PE on admission CT scan. Currently off O2, will continue to monitor.    Influenza positive  Tested positive for influenza A on 11/23.  He was started on Tamiflu and completed his course.  Influenza positive here.  -Droplet cautions  -ID consult as above    CKD  Recent baseline of 1.2-1.4 in the past month.  History of RCC status post right nephrectomy in 2007.  1.24 on admission.  -Follow-up creatinine in the morning to ensure stability    Decreased PO intake  Patient mentions decreased PO intake and fatigue. Will get nutrition team to see him and do calorie counts.  -Consult nutrition  -Calorie counts  -Check Mg and Phos    Hypertension  Stable vital signs and can give 2.5 mg of Norvasc.  Will hold chlorthalidone as above.    History of Graves' disease  Has follow-up appointment with endocrine due to subclinical hyperthyroidism (dose changed to 150 mcg/day).  -Continue Synthroid    Smoking cessation  -Continue prn nicorette    Hx of anxiety  -Continue lorazepam prn bedtime    HLD  -Continue Crestor       Diet: Snacks/Supplements Adult: Other - Please comment; Between Meals  High Kcal/High Protein Diet, ADULT  Calorie Counts  NPO per Anesthesia Guidelines for Procedure/Surgery Except for: Meds  DVT Prophylaxis: Pneumatic Compression Devices  Ga Catheter: Not present  Central Lines: PRESENT       Cardiac Monitoring: None  Code Status: Full Code    Clinically Significant Risk Factors Present on Admission         # Hyponatremia: Lowest Na = 130 mmol/L (Ref range: 136-145) in last 2 days, will monitor as appropriate    "     # Thrombocytopenia: Lowest platelets = 101 (Ref range: 150-450) in last 2 days, will monitor for bleeding       # Obesity: Estimated body mass index is 32.32 kg/m  as calculated from the following:    Height as of this encounter: 1.854 m (6' 1\").    Weight as of this encounter: 111.1 kg (245 lb).           Disposition Plan      Expected Discharge Date: 12/03/2022                The patient's care was discussed with the Bedside Nurse, Patient, Patient's Family and BMT Team.    Aldo Diaz MD  Hospitalist Service  [facility]  Securely message with the Vocera Web Console (learn more here)  Text page via [facility] Paging/Directory         ______________________________________________________________________    Chief Complaint   SOB and fatigue    History is obtained from the patient and patient's spouse    History of Present Illness   Nik Nava is a 62 year old male who has a past medical history of ALL (status post BMT), GVHD, prior PEs, EBV, and CKD who presents with shortness of breath and fatigue.    Patient reports worsening shortness of breath and fatigue for the past 6 weeks.  He does note that he started belumosudil for his GVHD approximate that time.  He has been tapering down on his prednisone and is at 15 mg/day.  He is on a chronic cough but not much of a sputum production.  Of note patient tested positive for influenza on 11/23 and subsequently took Tamiflu with some improvement.  No chest pain or palpitations.  Does not note any new pain in his neck or back.  No fever or chills.  No rashes or skin changes.  No abdominal pain.  No changes to his urinary or stool habits.  No bleeding per any orifice.  Lower extremity bilateral mild swelling stable.  No headaches.  No new sensory or motoric neurologic losses.  He mentions a decreased appetite.    Brother had lung cancer.  He does not currently smoke but uses Nicorette gum.  Drinks alcohol rarely.  Full " code.    Review of Systems    The 10 point Review of Systems is negative other than noted in the HPI or here.     Past Medical History    I have reviewed this patient's medical history and updated it with pertinent information if needed.   Past Medical History:   Diagnosis Date     Cancer (H)     renal cell     CKD (chronic kidney disease)      Thyroid disease        Past Surgical History   I have reviewed this patient's surgical history and updated it with pertinent information if needed.  Past Surgical History:   Procedure Laterality Date     BACK SURGERY  2017     BONE MARROW BIOPSY, BONE SPECIMEN, NEEDLE/TROCAR Left 9/2/2021    Procedure: BIOPSY, BONE MARROW;  Surgeon: Jailyn Ny APRN CNP;  Location: [facility] OR     BONE MARROW BIOPSY, BONE SPECIMEN, NEEDLE/TROCAR Left 11/15/2021    Procedure: BIOPSY, BONE MARROW;  Surgeon: Socrates De La Torre;  Location: [facility] OR     BONE MARROW BIOPSY, BONE SPECIMEN, NEEDLE/TROCAR Left 2/7/2022    Procedure: BIOPSY, BONE MARROW;  Surgeon: Renetta Wiggins PA-C;  Location: [facility] OR     BONE MARROW BIOPSY, BONE SPECIMEN, NEEDLE/TROCAR Left 8/18/2022    Procedure: BIOPSY, BONE MARROW;  Surgeon: Lorrie Yap PA-C;  Location: [facility] OR     BRONCHOSCOPY (RIGID OR FLEXIBLE), DIAGNOSTIC N/A 4/29/2022    Procedure: BRONCHOSCOPY, WITH BRONCHOALVEOLAR LAVAGE;  Surgeon: Murtaza Garcia MD;  Location: [facility] GI     IR CVC TUNNEL PLACEMENT > 5 YRS OF AGE  8/4/2021     IR CVC TUNNEL REMOVAL LEFT  9/2/2021     IR LIVER BIOPSY PERCUTANEOUS  2/21/2022     PERCUTANEOUS BIOPSY LIVER N/A 2/21/2022    Procedure: NEEDLE BIOPSY, LIVER, PERCUTANEOUS;  Surgeon: Trace Castellanos MD;  Location: [facility] OR       Social History   I have reviewed this patient's social history and updated it with pertinent information if needed.  Social History     Tobacco Use     Smoking status: Former     Packs/day: 2.00     Years: 30.00     Pack years: 60.00     Types: Cigarettes     Quit date: 5/4/2019      Years since quitting: 3.5     Smokeless tobacco: Never   Substance Use Topics     Alcohol use: Not Currently     Drug use: Never       Family History   I have reviewed this patient's family history and updated it with pertinent information if needed.  Family History   Problem Relation Age of Onset     Lung Cancer Mother      Polycythemia Father      Coronary Artery Disease Maternal Grandmother        Prior to Admission Medications   Prior to Admission Medications   Prescriptions Last Dose Informant Patient Reported? Taking?   Belumosudil Mesylate 200 MG TABS   Yes No   Sig: Take 200 mg by mouth 2 times daily   Belumosudil Mesylate 200 MG TABS   No No   Sig: Take 200 mg by mouth daily   Carboxymethylcell-Glycerin PF (REFRESH RELIEVA PF) 0.5-1 % SOLN   No No   Sig: Apply 1 drop to eye 4 times daily Preservative free. Either PF multidose bottle or PF vials   LORazepam (ATIVAN) 1 MG tablet   No No   Sig: Take 1 tablet (1 mg) by mouth nightly as needed for anxiety or sleep   acyclovir (ZOVIRAX) 800 MG tablet   No No   Sig: Take 1 tablet (800 mg) by mouth 2 times daily   amLODIPine (NORVASC) 2.5 MG tablet   No No   Sig: Take 1 tablet (2.5 mg) by mouth daily   azithromycin (ZITHROMAX) 250 MG tablet   No No   Sig: Take 1 tablet (250 mg) by mouth daily   carboxymethylcellulose PF (CARBOXYMETHYLCELLULOSE SODIUM) 0.5 % ophthalmic solution   No No   Sig: Place 1 drop into both eyes 4 times daily   chlorthalidone (HYGROTON) 25 MG tablet   No No   Sig: Take 0.5 tablets (12.5 mg) by mouth daily   clobetasol (TEMOVATE) 0.05 % external ointment   No No   Sig: Apply twice daily as needed for sore in the mouth   dexamethasone alcohol-free (DECADRON) 0.1 MG/ML solution   No No   Sig: Swish and spit 20 mLs (2 mg) in mouth 4 times daily   doxycycline hyclate (VIBRA-TABS) 100 MG tablet   No No   Sig: Take 1 tablet (100 mg) by mouth 2 times daily   erythromycin (ROMYCIN) 5 MG/GM ophthalmic ointment   No No   Sig: Place 0.5 inches into both  eyes At Bedtime   escitalopram (LEXAPRO) 10 MG tablet   No No   Sig: Take 1 tablet (10 mg) by mouth daily   fluorometholone (FML LIQUIFILM) 0.1 % ophthalmic suspension   No No   Sig: Place 1 drop into both eyes 2 times daily For total of 21 days then stop   levothyroxine (SYNTHROID/LEVOTHROID) 150 MCG tablet   No No   Sig: Take 1 tablet (150 mcg) by mouth daily   magnesium oxide (MAG-OX) 400 MG tablet   Yes No   Sig: Take 2 tablets (800 mg) by mouth 2 times daily   melatonin 5 MG CAPS   Yes No   Sig: Take 5 mg by mouth At Bedtime    nicotine polacrilex (NICORETTE) 4 MG gum   Yes No   Sig: Take 4 mg by mouth as needed for smoking cessation    omeprazole (PRILOSEC) 40 MG DR capsule   No No   Sig: Take 1 capsule (40 mg) by mouth daily   posaconazole (NOXAFIL) 100 MG EC tablet   No No   Sig: Take 3 tablets (300 mg) by mouth every morning   predniSONE (DELTASONE) 10 MG tablet   No No   Sig: Current dose is 30mg/day (x 10/25)   predniSONE (DELTASONE) 20 MG tablet   Yes No   Sig: Current dose is 30mg/day (x 10/25)   predniSONE (DELTASONE) 5 MG tablet   No No   Sig: Current dose is 30mg/day (x 10/25)   rosuvastatin (CRESTOR) 5 MG tablet   No No   Sig: Take 1 tablet (5 mg) by mouth daily   sennosides (SENOKOT) 8.6 MG tablet   No No   Sig: Take 1 tablet by mouth 3 times daily as needed for constipation   sodium chloride 0.9 % neb solution   No No   Sig: 3 mLs by Other route 2 times daily   traZODone (DESYREL) 50 MG tablet   No No   Sig: Take 1 tablet (50 mg) by mouth At Bedtime      Facility-Administered Medications: None     Allergies   Allergies   Allergen Reactions     Sulfamethoxazole W/Trimethoprim Rash       Physical Exam   Vital Signs: Temp: 98  F (36.7  C)   BP: 108/72 Pulse: 68   Resp: 17 SpO2: 94 % O2 Device: None (Room air)    Weight: 245 lbs 0 oz    Gen: NAD  HEENT:  Normocephalic, atraumatic, PERRL, no scleral icterus, no nasal discharge, OP moist and without lesions.    Neck: Supple, no LAD, no thyromegaly  CV:  Normal S1 S2, RRR  Respiratory: Slightly coarse breath sounds at bases, normal respiratory effort  Abdominal: Bowel sounds +, soft NT/ND  Extremities: Mild peripheral edema (nonpitting).  Skin: Warm and dry.   Neuro: Alert and oriented x3. Cranial nerves grossly intact.  Psych: normal mood/affect, appropriately oriented      Data   Data reviewed today: I reviewed all medications, new labs and imaging results over the last 24 hours. I personally reviewed the chest CT image(s) showing tree-in-bud nodules bilaterally.    Recent Labs   Lab 12/01/22  1114 12/01/22  1112 12/01/22  1040 11/29/22  1047   WBC  --   --  9.9 9.1   HGB 11.6*  --  12.1* 12.0*   MCV  --   --  101* 100   PLT  --   --  101* 94*   INR  --  0.86  --   --    * 131*  --  132*   POTASSIUM 4.8 4.9  --  4.8   CHLORIDE  --  97*  --  95*   CO2  --  23  --  24   BUN  --  39.2*  --  35.4*   CR  --  1.24*  --  1.24*   ANIONGAP  --  11  --  13   DAMON  --  9.6  --  9.9   * 111*  --  101*   ALBUMIN  --  4.0  --  4.0   PROTTOTAL  --  6.6  --  6.7   BILITOTAL  --  1.1  --  1.1   ALKPHOS  --  124  --  113   ALT  --  39  --  40   AST  --  37  --  41     Recent Results (from the past 24 hour(s))   CT Chest Pulmonary Embolism w Contrast    Narrative    EXAMINATION: CTA pulmonary angiogram, 12/1/2022 1:03 PM     COMPARISON: Chest CT 8/16/2022    HISTORY: shortness of breath, hx PE (also new meds), ? PE or other  cause for shortness of breath    TECHNIQUE: Volumetric helical acquisition of CT images of the chest  from the lung apices to the kidneys were acquired after the  administration of 80 mL of Isovue-370 IV contrast. .  Post-processed  multiplanar and/or MIP reformations were obtained, archived to PACS  and used in interpretation of this study.     FINDINGS:  Contrast bolus is: adequate.  Exam is negative   for acute pulmonary embolism.    Remainder of the mediastinum: Right chest port catheter terminates in  the low SVC. No cardiomegaly. No pericardial  effusion. Normal caliber  thoracic aorta and main pulmonary trunk. No suspicious lymphadenopathy  in the chest. Thoracic esophagus with normal limits.    Lungs: The central airways patent. Left greater than right bibasilar  tree-in-bud nodularity. Patchy and nodular groundglass opacities in  the superior left lower lobe and left upper lobe some of which contain  small areas of central cavitation. No effusions or pneumothorax.  Minimal apical paraseptal emphysema.    Upper abdomen: Evaluation of the upper abdomen is limited. Partially  seen right nephrectomy changes. Partially seen simple cystic density  in the left kidney. Nodular hepatic contour.    Bones/soft tissues: No destructive bony lesions. Soft tissues  unremarkable.      Impression    IMPRESSION:   1. Exam is negative for acute pulmonary embolism.    2. Left greater than right basilar predominant tree-in-bud nodularity  and a few centrally cavitating nodules in the left lung, suspicious  for atypical infection.    I have personally reviewed the examination and initial interpretation  and I agree with the findings.    HALEY CLINE,          SYSTEM ID:  [id number]

## 2022-12-02 NOTE — CONSULTS
Care Management Initial Consult    General Information  Assessment completed with: VM-chart review,    Type of CM/SW Visit: Other (comment) (Re-admission)    Primary Care Provider verified and updated as needed: Yes   Readmission within the last 30 days: no previous admission in last 30 days      Reason for Consult:  (post BMT complications)  Advance Care Planning: Advance Care Planning Reviewed: other (see comments) (No ACP docs in chart)       Communication Assessment  Patient's communication style: spoken language (English or Bilingual)        Cognitive  Cognitive/Neuro/Behavioral: WDL                      Living Environment:   People in home: spouse   (Pt Nik and wife Lamar)  Current living Arrangements: house      Able to return to prior arrangements: yes     Family/Social Support:  Care provided by: self  Provides care for: no one  Marital Status:   Wife, Children          Description of Support System: Supportive, Involved    Support Assessment: Adequate family and caregiver support, Adequate social supports    Current Resources:   Patient receiving home care services: No     Community Resources: None  Equipment currently used at home:  N/A  Supplies currently used at home:  N/A    Employment/Financial:  Employment Status: employed full-time        Financial Concerns: No concerns identified (Sw has dicussed aziza/SSDI in previous admissions)   Referral to Financial Worker: No     Lifestyle & Psychosocial Needs:  Social Determinants of Health     Tobacco Use: Medium Risk     Smoking Tobacco Use: Former     Smokeless Tobacco Use: Never     Passive Exposure: Not on file   Alcohol Use: Not on file   Financial Resource Strain: Not on file   Food Insecurity: Not on file   Transportation Needs: Not on file   Physical Activity: Not on file   Stress: Not on file   Social Connections: Not on file   Intimate Partner Violence: Not At Risk     Fear of Current or Ex-Partner: No     Emotionally Abused: No      Physically Abused: No     Sexually Abused: No   Depression: Not at risk     PHQ-2 Score: 0   Housing Stability: Not on file     Functional Status:  Prior to admission patient needed assistance: Independent    Mental Health/chemical dependency status:  See below.     Values/Beliefs:  Spiritual, Cultural Beliefs, Gnosticism Practices, Values that affect care: yes  Description of Beliefs that Will Affect Care:  (Pt identifies as Yazidism)      Other comments:   Per provider note from 12/1/22 - Nik Nava is a 62 year old male admitted on 12/1/2022. 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        Psychosocial assessment completed in the BMT clinic on 8/3/21 and has been copied here for staff review:     Blood and Marrow Transplant   Psychosocial Assessment with   Clinical      Assessment completed on 7/9/21 via phone as part of the COVID 19 protocol. Assessment of living situation, support system, financial status, functional status, coping, stressors, need for resources and social work intervention provided as needed. Information for this assessment was provided by pt and pt's spouse's report in addition to medical chart review and consultation with medical team.      Present at Assessment:   Patient: Nik Nava  Spouse: Lamar Nava  : MATEO Garcia LGSW     Diagnosis: Acute Lymphoblastic Leukemia (ALL)      Date of Diagnosis: 1/2021     Transplant type: Allogeneic     Donor: Related allogeneic donor stem cell transplant  Donor: Jenise Dasilva       Physician: Chapo Hill MD     Nurse Coordinator: Patito Herr RN     Social Workers: MATEO Garcia LGSW     Permanent/Local Address:   [id number] [place] [id number]     Living Situation: Pt  lives in a house w/ his spouse Lamar.      Local Address: Pt lives in [place], [place] (28 miles; 32 min from [facility] [place]) which is within the required distance of the hospital. Pt does not need to relocate.      Contact Information:  Cell Phone: [phone]  Home Phone: [phone]  Wife Cell Phone: [phone]  Pt Email: [email or web address]     Presenting Information:  Nik Nava is a 61 year old male diagnosed with ALL who presents for evaluation for an allogeneic transplant at the [facility] ([facility] Pt was accompanied to today's visit by his wife Lamar.      Decision Making: Self     Health Care Directive:   Yes. Pt has a health care directive and will bring it when they return to the BMT program.      Relationship Status: . Pt and pt's spouse have been  for 43 years. Pt described relationship as stable/supportive.      Special Needs: None identified at this time.      Family/Support System: Pt endorsed a strong support system including family and close friends who will be available to support pt throughout transplant process.      Family Information:   Spouse: Lamar Nava   Parents:   Siblings: Valentín, Mau, Jenise, & Uma (all live in the [facility] area)  Children: 3 -Son Chapo, Dtr Bouchra Fisher, & Dtr Lisa  *Pt & spouse have 13 grandchildren.     Caregiver: SW discussed with pt and pt's spouse the caregiver role and expectation at length. Pt is agreeable to having a full time caregiver for a minimum of 100 days until cleared by the BMT physician. Pt and pt's spouse confirmed understanding of the caregiver requirement. Pt's primary caregiver will be his wife Lamar with his sister Jenise & dtr Lisa as a secondary or back-up to assist as needed. Caregiver education and resources provided. No caregiver concerns identified.      Caregiver Contact Information:  Lamar Nava (wife) - Cell Phone: [phone]     Transportation Mode: Personal Vehicle. Pt is aware of driving  "restrictions post-BMT and the need for the caregiver is to drive until cleared to drive by the BMT physician. SW provided information on parking info and monthly parking pass options.      Insurance: Pt has BCBS health insurance. Pt denied specific insurance concerns at this time. NIYAH reiterated information about the [employer]  should specific insurance questions arise as pt moves through transplant process. Future Insurance questions referred to [facility] [employer] - Yamilet Espinoo -  # [phone].     Sources of Income: Pt is supported by [email or web address] benefits & spouse's employment income. Pt applied for SSDI on 6/16/21 and still waiting for results. Pt denied anticipation of immediate financial hardship related to BMT at this time. However, he does feel express concerns with decrease in income and various non-medical expenses continue at this time. NIYAH provided information on aziza options (discussed [employer] Co-[employer], & [employer]) and encouraged pt to contact this SW for support should financial situation change or this wish to apply.      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 Pt is currently taking medication (Lorazapam in the evenings along w/ melatonin) and pt feels the medication is working well. Pt endorses a history of panic attacks (last one approximately 3 years ago). Pt describes anxiety for him looks like struggling with sleep and intrusive anxious thoughts. When experiencing panic attacks Pt notes he experiences chest pain and inability to sleep. Pt identifies as an \"internal processor\" and walking is helpful for " "him. SW explained that it's not uncommon for patients going through transplant to experience symptoms of depression/anxiety. Pt believes he would be able to identify symptoms of his depression/anxiety throughout the transplant process.      PHQ-9:  Pt scored a 5 which indicates \"mild\" on the depression severity scale. Pt endorses this is an accurate reflection of his emotional state.     GAD7:  Pt scored a 4 which indicates \"mild\" signs of anxiety on the Generalized Anxiety Disorder Questionnaire. Pt endorses this is an accurate reflection of his emotional state.     Chemical Use:   Tobacco: No current use; quit 2 years ago, smoked 40 years, and Pt utilizes nicotine gum. Denies concern continuing to abstain.   Alcohol: No current use; prior to diagnosis Pt reports avg 6-10 beers/wk. Denies concern continuing to abstain.   Marijuana: No hx  Other Drugs: No hx  Based on the information provided, there appear to be no specific risks or concerns identified at this time.      Trauma/Loss/Abuse History: Multiple losses associated with cancer diagnosis and treatment, including health, employment, changes to physical appearance, etc.      Spirituality: Pt identified as Pentecostal. SW explained that there are Chaplains on the unit and pt can request to meet with a  at anytime. Pt is interested in a blessing ceremony.     Coping: Pt noted that he is currently feeling \"a little anxious\". Pt shared that his main coping mechanisms are positive self-talk, walking, chewing nicotine gum, prayer/spiritual practices, reading, exercise, and taking naps. Pt identifies as an \"internal processor.\" Pt indicates he \"comes from a family of worriers.\" Pt noted that he enjoys yardwork, gardening, cars, and going to his cabin. SW and pt discussed additional positive coping mechanisms that pt can utilize while in the hospital. While hospitalized, pt plans to bring his IPAD, listen to music, crossword books, and read magazines. " "     Caregiver Coping: Pt's wife noted that she is feeling \"a little overwhelmed\" following the RNCC education. Pt's wife noted that she mireya by talking with her family/friends, prayer/spiritual practices, thinking positively, and focusing on what she can control. She endorses that it is helpful for her to try to focus on the present rather than worry about the next chapter in Pt's treatment.      Education Provided: Transplant process expectations, Caregiver requirements, Caregiver self-care, Financial issues related to transplant, Financial resources/grants available, Common psychosocial stressors pre/post transplant, Support group(s) available, Hospital resources available, Web site information, Social Work role and Resources for children/siblings  Provided:  -[employer] Application  -[employer] Application  -[facility] Co-Pay Assistance Information  -[employer] Peer Connect  -[employer] Counseling Services  -Cancer Legal Care  -Cancer Care Support Groups  -Current Visitor Policy Handout     Interventions Provided: Psychosocial Support and Education      Assessment and Recommendations for Team:  Pt is a 61 year old male diagnosed with ALL who is here undergoing preparation for a planned allogeneic transplant.      Pt is pleasant, calm and able to articulate concerns/coping mechanisms in an appropriate manner. Pt utilized humor throughout CSW visit and communicated thoughtful insight to his psychosocial needs. During our meeting pt was alert, he was interactive, affect was full, he displayed appropriate memory and thought processes. Pt feels comfortable communicating with the medical team. Pt has a strong supportive network of family and friends who are involved. Pt has developed strong coping mechanisms.      Pt has a strong support system and a confirmed caregiver plan. Pt verbalizes understanding of the transplant process and wanting to proceed. SW provided contact information and encouraged pt to " contact SW with questions, concerns, resources and for support.     Per this assessment, SW did not identify any barriers to this patient moving forward with transplant.     Important Information:   - would like treadmill while IP.    - would like to bring his own brand of nicotine gum while IP and will discuss with nursing upon admission.    -hx of anxiety/panic attacks - nicotine gum and walking is helpful for Pt.      Follow up Planned:   Psychosocial support  Healthcare Directive     MATEO Garcia, [facility]  Adult Blood & Marrow Transplant   Phone: [phone]  Pager: [phone]    SHIN Corcoran  SOT/BMT/CF Float

## 2022-12-02 NOTE — PROGRESS NOTES
12/02/22 1400   Appointment Info   Signing Clinician's Name / Credentials (PT) Valorie Godwin DPT   Living Environment   People in Home spouse   Current Living Arrangements house   Home Accessibility stairs within home   Number of Stairs, Within Home, Primary greater than 10 stairs   Stair Railings, Within Home, Primary railings safe and in good condition   Transportation Anticipated family or friend will provide   Living Environment Comments Pt lives in split level home with wife - has 6 stairs down to lower level with bedroom, and 6 stairs to upper level. Pt typically sleeps on the couch.   Self-Care   Usual Activity Tolerance good   Current Activity Tolerance moderate   Regular Exercise No   Equipment Currently Used at Home none   Fall history within last six months no   Activity/Exercise/Self-Care Comment Pt reports IND at baseline. Change in status over the last 6 weeks - typically able to remain busy throughout the day getting things done around the house, however, hasn't been able to tolerate this due to SOB and fatigue.   General Information   Onset of Illness/Injury or Date of Surgery 12/01/22   Referring Physician Aldo Diaz MD   Patient/Family Therapy Goals Statement (PT) To [facility]   Pertinent History of Current Problem (include personal factors and/or comorbidities that impact the POC) Nik Nava is a 62 year old male admitted on 12/1/2022. 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 Patient has had a prolonged taper of his prednisone with new addition of belumosudil approximately 6 weeks ago. He has notable GVHD with recent flare.CT imaging with out acute pulmonary embolism but does show predominant tree-in-bud nodularities with some centrally cavitating  nodules in the left lung with suspicion for atypical infection.  Of note he was on azithromycin and doxycycline prior to admission.  Discussed with BMT team and plan to admit for potential primary PNA versus secondary infection post influenza (currently still positive, has already received Tamiflu). Broadening to IV cefepime with azithromycin. Will also get pulmonology involved for determination of possible intervention ([facility]).   Weight-Bearing Status - LUE full weight-bearing   Weight-Bearing Status - RUE full weight-bearing   Weight-Bearing Status - LLE full weight-bearing   Weight-Bearing Status - RLE full weight-bearing   Heart Disease Risk Factors Lack of physical activity;Overweight;Stress;Family history;Medical history;Gender   Cognition   Affect/Mental Status (Cognition) WFL   Orientation Status (Cognition) oriented x 4   Follows Commands (Cognition) WFL   Pain Assessment   Patient Currently in Pain No   Integumentary/Edema   Integumentary/Edema no deficits were identifed   Posture    Posture Forward head position;Protracted shoulders   Range of Motion (ROM)   Range of Motion ROM is WFL   Strength (Manual Muscle Testing)   Strength (Manual Muscle Testing) strength is WFL   Strength Comments 4/5 bilat LEs (hip flexion, knee extension/flexion, ankle DF/PF); hx of R drop foot following BMT   Bed Mobility   Comment, (Bed Mobility) IND   Transfers   Comment, (Transfers) IND   Gait/Stairs (Locomotion)   Comment, (Gait/Stairs) IND, unable to assess outside room at al 2/2 droplet precautions   Balance   Balance no deficits were identified   Sensory Examination   Sensory Perception patient reports no sensory changes   Coordination   Coordination no deficits were identified   Muscle Tone   Muscle Tone no deficits were identified   Clinical Impression   Criteria for Skilled Therapeutic Intervention Yes, treatment indicated   PT Diagnosis (PT) Decreased activity tolerance   Clinical Presentation (PT Evaluation  Complexity) Stable/Uncomplicated   Clinical Presentation Rationale Pt has 1-2 body structure/function impairments requiring low complexity clinical decision making.   Clinical Decision Making (Complexity) low complexity   Planned Therapy Interventions (PT) gait training;patient/family education;postural re-education;stair training;strengthening;transfer training;progressive activity/exercise;risk factor education   Risk & Benefits of therapy have been explained evaluation/treatment results reviewed;care plan/treatment goals reviewed;risks/benefits reviewed;current/potential barriers reviewed;participants voiced agreement with care plan;participants included;patient;spouse/significant other   PT Total Evaluation Time   PT Eval, Low Complexity Minutes (58536) 10   Plan of Care Review   Plan of Care Reviewed With patient;spouse   Physical Therapy Goals   PT Frequency 5x/week   PT Predicted Duration/Target Date for Goal Attainment 12/30/22   PT Goals Gait;Stairs;Aerobic Activity   PT: Gait Independent;Greater than 200 feet   PT: Stairs Independent;Greater than 10 stairs   PT: Perform aerobic activity with stable cardiovascular response continuous activity;25 minutes;ambulation   Therapeutic Procedure/Exercise   Ther. Procedure: strength, endurance, ROM, flexibillity Minutes (56382) 18   Symptoms Noted During/After Treatment fatigue   Treatment Detail/Skilled Intervention Pt greeted seated EOB with wife present, agreeable to therapy. Discussed, at length, options for increased activity level. Educated pt on anticipated deconditioning with decreased activity over past 6 weeks. To increase activity tolerance, instructed in repeated sit<>stands x10 reps without use of UEs. SpO2 >95%,  bpm. Pt noting mild fatigue with this. Encouraged repeated sit<>stands x5 reps throughout the day as starting place for activity, pt agreeable. Left with all needs met at PT departure.   PT Discharge Planning   PT Plan Progress  ambulation distance, Nustep for activity tolerance   PT Discharge Recommendation (DC Rec) home with assist;home with outpatient physical therapy   PT Rationale for DC Rec Pt is near baseline mobility and primarily limited by decreased activity tolerance. Recommend OP PT upon dc to progress functional endurance.   PT Brief overview of current status IND in room   Total Session Time   Timed Code Treatment Minutes 18   Total Session Time (sum of timed and untimed services) 28

## 2022-12-02 NOTE — PROGRESS NOTES
CLINICAL NUTRITION SERVICES - ASSESSMENT NOTE     Nutrition Prescription    RECOMMENDATIONS FOR MDs/PROVIDERS TO ORDER:  Advance diet as medically able post procedure     Malnutrition Status:    Patient does not meet two criteria at this time     Recommendations already ordered by Registered Dietitian (RD):  Discontinued calorie count (NPO this am)  Ordered ensure daily     Future/Additional Recommendations:  If unable to change medication regimen, impacting appetite consider appetite stimulant  Continue to encourage oral intake - consider additional scheduled supplements and snacks        REASON FOR ASSESSMENT  Nik Nava is a/an 62 year old male assessed by the dietitian for Provider Order - decreased PO intake, fatigue    CLINICAL HISTORY   ALL (status post BMT, 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  --Influenza Positive   -Possible Bronch today     NUTRITION HISTORY  Patient and wife in room. Reported that appetite was low for the past 6 weeks and severely further declined in the last week due to influenza. He reported prior to 6 weeks ago, he was eating normally. Then, medication change has greatly impacted appetite due to taste changes and general lack of appetite He has been trying to drink premiere protein drink and eat half banana in the morning, possibly a sandwich in the afternoon and dinner is variable. Lamar (spouse) was in room and reported due to taste changes, it has been very difficult to find foods Nik wants to eat. Nik is willing to try supplements and continue premiere.    Reported that weight has fluctuated greatly due to prednisone/steroid medications. He was around 238-240 lbs during transplant and weight up to about 250 lbs. Due to steroids, he was experiencing increased appetite and edema- see weight hx below.  "    CURRENT NUTRITION ORDERS  Diet: NPO--> Regular   Intake/Tolerance: N/A  -Calorie count ordered on 12/1, Pt NPO     LABS  Na 134 (L), K+ 4.6 (WNL), Urea nitrogen 38 (H), Creatinine 1.3 (H)  Total Bili 1.3 (H)  Glucose 87 (WNL)    MEDICATIONS  Acyclovir  Decardoon  Levothyroxine  Protonix  Posaconazole  Prednisone    ANTHROPOMETRICS  Height: 185.4 cm (6' 1\")  Most Recent Weight: 111.1 kg (245 lb)    IBW: 83.6 kg  BMI: Obesity Grade I BMI 30-34.9  Weight History:   Wt Readings from Last 25 Encounters:   12/01/22 111.1 kg (245 lb)   11/29/22 113.4 kg (249 lb 14.4 oz)   11/22/22 117.8 kg (259 lb 9.6 oz)   11/08/22 114.9 kg (253 lb 3.2 oz)   11/01/22 117.3 kg (258 lb 9.6 oz)   11/01/22 120.2 kg (264 lb 14.4 oz)   10/25/22 117.9 kg (260 lb)   10/18/22 117.9 kg (260 lb)   10/14/22 114.2 kg (251 lb 11.2 oz)   10/11/22 116.8 kg (257 lb 6.4 oz)   09/20/22 110.7 kg (244 lb)   09/15/22 113.2 kg (249 lb 8 oz)   09/13/22 111.5 kg (245 lb 12.8 oz)   09/10/22 111.1 kg (245 lb)   09/06/22 112.4 kg (247 lb 12.8 oz)   08/18/22 112.5 kg (248 lb)   08/18/22 112.3 kg (247 lb 8 oz)   08/18/22 112.9 kg (248 lb 12.8 oz)   07/26/22 108 kg (238 lb 3.2 oz)   06/21/22 104.8 kg (231 lb)   06/01/22 103 kg (227 lb)   05/04/22 103 kg (227 lb)   04/29/22 102.8 kg (226 lb 10.1 oz)   04/28/22 101.2 kg (223 lb)   04/28/22 101.4 kg (223 lb 8 oz)     Dosing Weight: 90.4  Kg (Adjusted based on weight of 111.1 kg and IBW)     ASSESSED NUTRITION NEEDS  Estimated Energy Needs: 1688-5992 kcals/day (25 - 30 kcals/kg)  Justification: Maintenance  Estimated Protein Needs: 108-135 grams protein/day (1.2 - 1.5 grams of pro/kg)  Justification: Increased needs with hospitalization/lower end with kidney function  Estimated Fluid Needs: 2662-5138 mL/day (1 mL/kcal)   Justification: Maintenance    PHYSICAL FINDINGS  See malnutrition section below.    MALNUTRITION  % Intake: </=50% for >/= 1 month (severe)  % Weight Loss: Weight loss does not meet " criteria  Subcutaneous Fat Loss: None observed  Muscle Loss: None observed  Fluid Accumulation/Edema: None noted  Malnutrition Diagnosis: Patient does not meet two of the established criteria necessary for diagnosing malnutrition    NUTRITION DIAGNOSIS  Inadequate oral intake related to decreased appetite  as evidenced by report of significant decline in appetite for the past 6 weeks     INTERVENTIONS  Implementation  Nutrition Education: RD role in care, potential for appetite stimulant    Collaboration with other providers- 5c rounds  Nutrition Supplement      Goals  Diet adv v nutrition support within 2-3 days.    Intake >75% of nutritionally adequate meals TID  Or equivalent with snacks/supplements      Monitoring/Evaluation  Progress toward goals will be monitored and evaluated per protocol.    Nuris Price RD, LD  Weekend pager: [phone]

## 2022-12-02 NOTE — TELEPHONE ENCOUNTER
[facility] Call Center    Phone Message    May a detailed message be left on voicemail: yes     Reason for Call: Other: Lamar, patients wife called in stating that patient was just seen yesterday with Dr Juarez and given a contact lens for his right eye which patient said he can see better with but this morning they woke up and it broke so patient couldn't wear it. Lamar also want to let us know that patient is currently aadmitted at the [facility] on [address]. Please call Lamar back to advise. Thank you.     Action Taken: Message routed to:  [facility] ([facility] Eye    Travel Screening: Not Applicable

## 2022-12-03 VITALS
BODY MASS INDEX: 32.47 KG/M2 | WEIGHT: 245 LBS | HEART RATE: 78 BPM | OXYGEN SATURATION: 96 % | RESPIRATION RATE: 16 BRPM | HEIGHT: 73 IN | TEMPERATURE: 97.4 F | SYSTOLIC BLOOD PRESSURE: 97 MMHG | DIASTOLIC BLOOD PRESSURE: 64 MMHG

## 2022-12-03 DIAGNOSIS — Z94.81 S/P BONE MARROW TRANSPLANT (H): ICD-10-CM

## 2022-12-03 DIAGNOSIS — C91.01 ACUTE LYMPHOBLASTIC LEUKEMIA (ALL) IN REMISSION (H): Primary | ICD-10-CM

## 2022-12-03 LAB
ANION GAP SERPL CALCULATED.3IONS-SCNC: 14 MMOL/L (ref 7–15)
BASOPHILS # BLD AUTO: 0 10E3/UL (ref 0–0.2)
BASOPHILS NFR BLD AUTO: 0 %
BUN SERPL-MCNC: 37.4 MG/DL (ref 8–23)
CALCIUM SERPL-MCNC: 9.4 MG/DL (ref 8.8–10.2)
CHLORIDE SERPL-SCNC: 99 MMOL/L (ref 98–107)
CREAT SERPL-MCNC: 1.33 MG/DL (ref 0.67–1.17)
DEPRECATED HCO3 PLAS-SCNC: 20 MMOL/L (ref 22–29)
EOSINOPHIL # BLD AUTO: 0 10E3/UL (ref 0–0.7)
EOSINOPHIL NFR BLD AUTO: 0 %
ERYTHROCYTE [DISTWIDTH] IN BLOOD BY AUTOMATED COUNT: 16 % (ref 10–15)
GFR SERPL CREATININE-BSD FRML MDRD: 60 ML/MIN/1.73M2
GLUCOSE BLDC GLUCOMTR-MCNC: 116 MG/DL (ref 70–99)
GLUCOSE SERPL-MCNC: 101 MG/DL (ref 70–99)
HCT VFR BLD AUTO: 33.9 % (ref 40–53)
HGB BLD-MCNC: 11.6 G/DL (ref 13.3–17.7)
HOLD SPECIMEN: NORMAL
IMM GRANULOCYTES # BLD: 0.1 10E3/UL
IMM GRANULOCYTES NFR BLD: 1 %
LYMPHOCYTES # BLD AUTO: 2.5 10E3/UL (ref 0.8–5.3)
LYMPHOCYTES NFR BLD AUTO: 45 %
MAGNESIUM SERPL-MCNC: 2.5 MG/DL (ref 1.7–2.3)
MCH RBC QN AUTO: 34.7 PG (ref 26.5–33)
MCHC RBC AUTO-ENTMCNC: 34.2 G/DL (ref 31.5–36.5)
MCV RBC AUTO: 102 FL (ref 78–100)
MONOCYTES # BLD AUTO: 0.6 10E3/UL (ref 0–1.3)
MONOCYTES NFR BLD AUTO: 11 %
NEUTROPHILS # BLD AUTO: 2.4 10E3/UL (ref 1.6–8.3)
NEUTROPHILS NFR BLD AUTO: 43 %
NRBC # BLD AUTO: 0 10E3/UL
NRBC BLD AUTO-RTO: 1 /100
PLATELET # BLD AUTO: 105 10E3/UL (ref 150–450)
POTASSIUM SERPL-SCNC: 4.7 MMOL/L (ref 3.4–5.3)
RBC # BLD AUTO: 3.34 10E6/UL (ref 4.4–5.9)
SODIUM SERPL-SCNC: 133 MMOL/L (ref 136–145)
WBC # BLD AUTO: 5.6 10E3/UL (ref 4–11)

## 2022-12-03 PROCEDURE — 85025 COMPLETE CBC W/AUTO DIFF WBC: CPT | Performed by: STUDENT IN AN ORGANIZED HEALTH CARE EDUCATION/TRAINING PROGRAM

## 2022-12-03 PROCEDURE — 250N000013 HC RX MED GY IP 250 OP 250 PS 637: Performed by: STUDENT IN AN ORGANIZED HEALTH CARE EDUCATION/TRAINING PROGRAM

## 2022-12-03 PROCEDURE — 83735 ASSAY OF MAGNESIUM: CPT | Performed by: PHYSICIAN ASSISTANT

## 2022-12-03 PROCEDURE — 250N000011 HC RX IP 250 OP 636: Performed by: STUDENT IN AN ORGANIZED HEALTH CARE EDUCATION/TRAINING PROGRAM

## 2022-12-03 PROCEDURE — 999N000111 HC STATISTIC OT IP EVAL DEFER

## 2022-12-03 PROCEDURE — 250N000012 HC RX MED GY IP 250 OP 636 PS 637: Performed by: STUDENT IN AN ORGANIZED HEALTH CARE EDUCATION/TRAINING PROGRAM

## 2022-12-03 PROCEDURE — 80048 BASIC METABOLIC PNL TOTAL CA: CPT | Performed by: STUDENT IN AN ORGANIZED HEALTH CARE EDUCATION/TRAINING PROGRAM

## 2022-12-03 PROCEDURE — 250N000013 HC RX MED GY IP 250 OP 250 PS 637: Performed by: PHYSICIAN ASSISTANT

## 2022-12-03 PROCEDURE — 80187 DRUG ASSAY POSACONAZOLE: CPT | Performed by: PHYSICIAN ASSISTANT

## 2022-12-03 PROCEDURE — 36415 COLL VENOUS BLD VENIPUNCTURE: CPT | Performed by: STUDENT IN AN ORGANIZED HEALTH CARE EDUCATION/TRAINING PROGRAM

## 2022-12-03 PROCEDURE — 99239 HOSP IP/OBS DSCHRG MGMT >30: CPT | Mod: GC | Performed by: STUDENT IN AN ORGANIZED HEALTH CARE EDUCATION/TRAINING PROGRAM

## 2022-12-03 RX ORDER — PREDNISONE 5 MG/1
TABLET ORAL
Qty: 60 TABLET | Refills: 3 | COMMUNITY
Start: 2022-12-03 | End: 2023-03-06

## 2022-12-03 RX ORDER — PREDNISONE 10 MG/1
TABLET ORAL
Qty: 30 TABLET | Refills: 1 | COMMUNITY
Start: 2022-12-03 | End: 2023-01-12

## 2022-12-03 RX ORDER — HEPARIN SODIUM (PORCINE) LOCK FLUSH IV SOLN 100 UNIT/ML 100 UNIT/ML
5-10 SOLUTION INTRAVENOUS
Status: DISCONTINUED | OUTPATIENT
Start: 2022-12-03 | End: 2022-12-03 | Stop reason: HOSPADM

## 2022-12-03 RX ORDER — HEPARIN SODIUM,PORCINE 10 UNIT/ML
5-10 VIAL (ML) INTRAVENOUS EVERY 24 HOURS
Status: DISCONTINUED | OUTPATIENT
Start: 2022-12-03 | End: 2022-12-03 | Stop reason: HOSPADM

## 2022-12-03 RX ORDER — HEPARIN SODIUM,PORCINE 10 UNIT/ML
5-10 VIAL (ML) INTRAVENOUS
Status: DISCONTINUED | OUTPATIENT
Start: 2022-12-03 | End: 2022-12-03 | Stop reason: HOSPADM

## 2022-12-03 RX ADMIN — FLUOROMETHOLONE 1 DROP: 1 SOLUTION/ DROPS OPHTHALMIC at 08:31

## 2022-12-03 RX ADMIN — ESCITALOPRAM OXALATE 10 MG: 10 TABLET ORAL at 08:30

## 2022-12-03 RX ADMIN — LEVOTHYROXINE SODIUM 150 MCG: 0.07 TABLET ORAL at 08:30

## 2022-12-03 RX ADMIN — Medication 800 MG: at 08:30

## 2022-12-03 RX ADMIN — PREDNISONE 15 MG: 5 TABLET ORAL at 08:30

## 2022-12-03 RX ADMIN — ACYCLOVIR 800 MG: 800 TABLET ORAL at 08:30

## 2022-12-03 RX ADMIN — Medication 2 MG: at 08:32

## 2022-12-03 RX ADMIN — DEXAMETHASONE 2 MG: 0.5 SOLUTION ORAL at 08:32

## 2022-12-03 RX ADMIN — POSACONAZOLE 300 MG: 100 TABLET, COATED ORAL at 08:31

## 2022-12-03 RX ADMIN — HEPARIN 5 ML: 100 SYRINGE at 12:56

## 2022-12-03 RX ADMIN — Medication 1 DROP: at 08:31

## 2022-12-03 RX ADMIN — PANTOPRAZOLE SODIUM 40 MG: 40 TABLET, DELAYED RELEASE ORAL at 08:30

## 2022-12-03 RX ADMIN — AZITHROMYCIN MONOHYDRATE 500 MG: 250 TABLET ORAL at 08:30

## 2022-12-03 ASSESSMENT — ACTIVITIES OF DAILY LIVING (ADL)
ADLS_ACUITY_SCORE: 39

## 2022-12-03 NOTE — PLAN OF CARE
Occupational Therapy: Orders received. Chart reviewed and discussed with care team.? Occupational Therapy not indicated at this time as pt is at baseline w/ ADL completion, primarily limited by activity tolerance. Needs able to be met by one discipline and no skilled OT needs at this time, PT to follow.? Defer discharge recommendations to PT.? Will complete orders.

## 2022-12-03 NOTE — PLAN OF CARE
Discharged to: home  Transportation: via car w wife  Time: 1315  Prescriptions: does not have any prescription to   Belongings: pt brought all belongings home with him  PIV/Access: writer removed R PIV and de-accessed R port  Care Plan and Education discontinued: done  Paperwork: writer reviewed and signed paperwork with pt

## 2022-12-03 NOTE — PLAN OF CARE
"Goal Outcome Evaluation:  /72 (BP Location: Left arm)   Pulse 74   Temp 97.8  F (36.6  C) (Oral)   Resp 16   Ht 1.854 m (6' 1\")   Wt 111.1 kg (245 lb)   SpO2 95%   BMI 32.32 kg/m      Admitted for SOB and worsening fatigue. A&Ox4, denied pain. H/o BMT more than a year ago for ALL. GVHD on his eyes. Eye drops per MAR. Pt denied SOB this shift. Independent in his room. Vs stable and afebrile. Left lower lobe expiratory wheezing. On droplet isol for positive influenza. Infrequent non productive coughing. Sputum specimen needed. MRSA swaps sent.   "

## 2022-12-04 LAB
GALACTOMANNAN AG SERPL QL IA: NEGATIVE
GALACTOMANNAN AG SPEC IA-ACNC: 0.08

## 2022-12-05 LAB
1,3 BETA GLUCAN SER-MCNC: NORMAL PG/ML
OBSERVATION IMP: NORMAL

## 2022-12-05 NOTE — TELEPHONE ENCOUNTER
Will give trial [employer] 7.9/14.9/-2.00 to use until Rx lens arrives.    They will  tomorrow 12/6 @ [facility] prior to their [facility] appt

## 2022-12-06 ENCOUNTER — APPOINTMENT (OUTPATIENT)
Dept: LAB | Facility: CLINIC | Age: 62
End: 2022-12-06
Attending: INTERNAL MEDICINE
Payer: COMMERCIAL

## 2022-12-06 ENCOUNTER — ONCOLOGY VISIT (OUTPATIENT)
Dept: TRANSPLANT | Facility: CLINIC | Age: 62
End: 2022-12-06
Attending: INTERNAL MEDICINE
Payer: COMMERCIAL

## 2022-12-06 VITALS
OXYGEN SATURATION: 98 % | TEMPERATURE: 98 F | HEART RATE: 85 BPM | DIASTOLIC BLOOD PRESSURE: 77 MMHG | RESPIRATION RATE: 18 BRPM | BODY MASS INDEX: 32.47 KG/M2 | SYSTOLIC BLOOD PRESSURE: 125 MMHG | WEIGHT: 246.1 LBS

## 2022-12-06 DIAGNOSIS — Z94.81 STATUS POST BONE MARROW TRANSPLANT (H): ICD-10-CM

## 2022-12-06 DIAGNOSIS — Z94.81 S/P BONE MARROW TRANSPLANT (H): ICD-10-CM

## 2022-12-06 DIAGNOSIS — C91.01 ACUTE LYMPHOBLASTIC LEUKEMIA (ALL) IN REMISSION (H): ICD-10-CM

## 2022-12-06 LAB
ALBUMIN SERPL BCG-MCNC: 3.8 G/DL (ref 3.5–5.2)
ALP SERPL-CCNC: 121 U/L (ref 40–129)
ALT SERPL W P-5'-P-CCNC: 42 U/L (ref 10–50)
ANION GAP SERPL CALCULATED.3IONS-SCNC: 10 MMOL/L (ref 7–15)
AST SERPL W P-5'-P-CCNC: 46 U/L (ref 10–50)
BASOPHILS # BLD AUTO: 0 10E3/UL (ref 0–0.2)
BASOPHILS NFR BLD AUTO: 0 %
BILIRUB SERPL-MCNC: 0.8 MG/DL
BUN SERPL-MCNC: 42.2 MG/DL (ref 8–23)
CALCIUM SERPL-MCNC: 9.5 MG/DL (ref 8.8–10.2)
CHLORIDE SERPL-SCNC: 100 MMOL/L (ref 98–107)
CREAT SERPL-MCNC: 1.46 MG/DL (ref 0.67–1.17)
DEPRECATED HCO3 PLAS-SCNC: 26 MMOL/L (ref 22–29)
EOSINOPHIL # BLD AUTO: 0 10E3/UL (ref 0–0.7)
EOSINOPHIL NFR BLD AUTO: 0 %
ERYTHROCYTE [DISTWIDTH] IN BLOOD BY AUTOMATED COUNT: 15.6 % (ref 10–15)
GFR SERPL CREATININE-BSD FRML MDRD: 53 ML/MIN/1.73M2
GLUCOSE SERPL-MCNC: 118 MG/DL (ref 70–99)
HCT VFR BLD AUTO: 32.5 % (ref 40–53)
HGB BLD-MCNC: 11 G/DL (ref 13.3–17.7)
IMM GRANULOCYTES # BLD: 0.2 10E3/UL
IMM GRANULOCYTES NFR BLD: 2 %
LYMPHOCYTES # BLD AUTO: 3.9 10E3/UL (ref 0.8–5.3)
LYMPHOCYTES NFR BLD AUTO: 36 %
MAGNESIUM SERPL-MCNC: 2.1 MG/DL (ref 1.7–2.3)
MCH RBC QN AUTO: 34.3 PG (ref 26.5–33)
MCHC RBC AUTO-ENTMCNC: 33.8 G/DL (ref 31.5–36.5)
MCV RBC AUTO: 101 FL (ref 78–100)
MONOCYTES # BLD AUTO: 1.1 10E3/UL (ref 0–1.3)
MONOCYTES NFR BLD AUTO: 10 %
NEUTROPHILS # BLD AUTO: 5.7 10E3/UL (ref 1.6–8.3)
NEUTROPHILS NFR BLD AUTO: 52 %
NRBC # BLD AUTO: 0 10E3/UL
NRBC BLD AUTO-RTO: 0 /100
PLATELET # BLD AUTO: 111 10E3/UL (ref 150–450)
POSACONAZOLE SERPL-MCNC: 2.7 UG/ML (ref 0.7–5)
POTASSIUM SERPL-SCNC: 4.3 MMOL/L (ref 3.4–5.3)
PROT SERPL-MCNC: 6.2 G/DL (ref 6.4–8.3)
RBC # BLD AUTO: 3.21 10E6/UL (ref 4.4–5.9)
SODIUM SERPL-SCNC: 136 MMOL/L (ref 136–145)
WBC # BLD AUTO: 10.8 10E3/UL (ref 4–11)

## 2022-12-06 PROCEDURE — 36591 DRAW BLOOD OFF VENOUS DEVICE: CPT

## 2022-12-06 PROCEDURE — 83735 ASSAY OF MAGNESIUM: CPT

## 2022-12-06 PROCEDURE — 85004 AUTOMATED DIFF WBC COUNT: CPT

## 2022-12-06 PROCEDURE — 80053 COMPREHEN METABOLIC PANEL: CPT

## 2022-12-06 PROCEDURE — 99214 OFFICE O/P EST MOD 30 MIN: CPT

## 2022-12-06 PROCEDURE — 99212 OFFICE O/P EST SF 10 MIN: CPT

## 2022-12-06 PROCEDURE — G0463 HOSPITAL OUTPT CLINIC VISIT: HCPCS

## 2022-12-06 PROCEDURE — 250N000011 HC RX IP 250 OP 636: Performed by: INTERNAL MEDICINE

## 2022-12-06 RX ORDER — HEPARIN SODIUM (PORCINE) LOCK FLUSH IV SOLN 100 UNIT/ML 100 UNIT/ML
5 SOLUTION INTRAVENOUS ONCE
Status: COMPLETED | OUTPATIENT
Start: 2022-12-06 | End: 2022-12-06

## 2022-12-06 RX ORDER — AMLODIPINE BESYLATE 2.5 MG/1
2.5 TABLET ORAL DAILY
Qty: 30 TABLET | Refills: 1 | COMMUNITY
Start: 2022-12-06 | End: 2023-01-12

## 2022-12-06 RX ORDER — MAGNESIUM OXIDE 400 MG/1
400 TABLET ORAL 2 TIMES DAILY
Qty: 120 TABLET | Refills: 1 | COMMUNITY
Start: 2022-12-06 | End: 2023-10-10

## 2022-12-06 RX ADMIN — Medication 5 ML: at 14:00

## 2022-12-06 ASSESSMENT — PAIN SCALES - GENERAL: PAINLEVEL: NO PAIN (0)

## 2022-12-06 NOTE — PROGRESS NOTES
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     Brief Hospital Course and Plan:   Found to have some mild pulmonary abnormalities of the L lung on CT, and was treated with a treatment course of azithromycin. Pulmonary and ID consults were placed, both with some concern for possible belumosudil as the cause of his symptoms as opposed to his lung findings. No additional interventions were recommended for his ongoing influenza positivity. He was not able to take his home belumosudil during admission and his symptoms improved.    Further discussions on discontinuing Belumosudil with transition to possible alternatives can be had. Follow-up on inpatient labs include fungal studies and posaconazole level.     Interim History  Today feeling much better;  R eye no longer sore/swollen;   was given new scleral lens after first one was torn;   may need to bring new one back to ophthalmaology for repeat instruction on its use  Overall energy improved; no fcs. no bleeidng except torn fragile skin on arms  (puppy scratched him).   No new bone pain  No new rash except the bruises.  Eating ok   Some LE edema but not much change  recently started hygroton but still on low dose amlodipine.    /77 (BP Location: Right arm, Patient Position: Sitting, Cuff Size: Adult Large)   Pulse 85   Temp 98  F (36.7  C) (Oral)   Resp 18   Wt 111.6 kg (246 lb 1.6 oz)   SpO2 98%   BMI 32.47 kg/m    Wt Readings from Last 4 Encounters:   12/06/22 111.6 kg (246 lb 1.6 oz)   12/01/22 111.1 kg (245 lb)   11/29/22 113.4 kg (249 lb 14.4 oz)   11/22/22 117.8 kg (259 lb 9.6 oz)     Labls;   cbc ok   Creat a bit higher at 1.46; similar to recent range.  galactomann neg;  beta2 glucan couldn't run    hemolyzed.    Problems; and plan     HEME/COAG  #Secondary iron overload: per outpatient team plan is for phlebotomies 250ml every 4 weeks as tolerated (as long as Hgb >11). He did not require any transfusions during admission.     #Acute lymphoblastic leukemia, [place] chromosome positive in CR, MRD neg. S/p allo sib PBSCT. (ABO matched)  HCT-CI score: 3 (prior solid tumor)  Remains in CR. See outpatient notes. TKI on hold given ongoing concerns with GVHD.     IMMUNOCOMPROMISED  #Influenza A: positive test on 11/23, s/p course of Tamiflu. Tested positive again on admission. ID was consulted and no plan for further interventions and unlikely to be causing his symptoms.     #Pulmonary infiltrates:   He has had numerous pulmonary findings on imaging previously. CT chest on admission showed scant tree-in-bud consolidations with some central cavitary lesions in the L lung. Pulmonary and ID were consulted with recommendations for empiric antibiotics. He completed a treatment course of azithromycin. Fungal workup was recommended and remains pending. He did not have sputum production so unable to test for additional etiologies of infection with AFB stain.  Cont posaconazole; repeat beta 2 d glucan but doubt active new fungal infection.        #Vaccinations & Prophylaxis    Vaccination status: Influenza vaccination after day +60, COVID vaccination after day +100, followed by vaccinations at 12, 14, 24, and 26 months.    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 See outpatient notes. He was started on belumosudil approximately six weeks ago with almost immediate issues with malaise, exertional dyspnea, and anorexia. There is ongoing suspicion that these symptoms are related to the medication. Of note, he was not able to get his home medication administered during admission and his symptoms improved  significantly.   He has restarted his belumosidil; no increase in his symptoms; not a good plan to change GVHD meds unless its clear that fatigue and felling unwell was due to the drug; Overlapped in tme with ifluenza and other problems    Plan- Continue prednisone 15mg (prolonged taper)  - Continue belumosudil for now as there is no good justification for changing meds now.     CARDIOVASCULAR  #Hypertension: Continue amlodipine 2.5mg daily     RESPIRATORY  # Pulmonary infiltrates, dyspnea, c/f PNA, recent Influenza  - See above  NO clear diagnosis.     #Smoking: Nicorette PRN     GI/NUTRITION    Ulcer prophylaxis: Protonix    Risk of malnutrition: RD consulted     RENAL/ELECTROLYTES/  #CKD: Recent baseline 1.2-1.4. Hx RCC s/p R nephrectomy in 2007. Creatinine stable during admission.  #Hyponatremia: Decreased PO intake,  on chlorthalidone. Restarted on discharge given some improvement and improved appetite.  Very low dose amlodipine; BP ok;   Hold amlodipine in case it is contributing to his LE edema.     DIABETES/ENDOCRINE  #Graves' disease: Continue Synthroid.  TSH very suppressed    RTC 1 week;  visit and labs; Pt to call if new sx develop    Chapo Hill MD         Latest Reference Range & Units 12/03/22 04:52 12/03/22 11:25 12/06/22 14:05   Sodium 136 - 145 mmol/L 133 (L)  136   Potassium 3.4 - 5.3 mmol/L 4.7  4.3   Chloride 98 - 107 mmol/L 99  100   Carbon Dioxide (CO2) 22 - 29 mmol/L 20 (L)  26   Urea Nitrogen 8.0 - 23.0 mg/dL 37.4 (H)  42.2 (H)   Creatinine 0.67 - 1.17 mg/dL 1.33 (H)  1.46 (H)   GFR Estimate >60 mL/min/1.73m2 60 (L)  53 (L)   Calcium 8.8 - 10.2 mg/dL 9.4  9.5   Anion Gap 7 - 15 mmol/L 14  10   Magnesium 1.7 - 2.3 mg/dL 2.5 (H)  2.1   Albumin 3.5 - 5.2 g/dL   3.8   Protein Total 6.4 - 8.3 g/dL   6.2 (L)   Alkaline Phosphatase 40 - 129 U/L   121   ALT 10 - 50 U/L   42   AST 10 - 50 U/L   46   Bilirubin Total <=1.2 mg/dL   0.8   Glucose 70 - 99 mg/dL 101 (H)  118 (H)   GLUCOSE BY  METER POCT 70 - 99 mg/dL  116 (H)    WBC 4.0 - 11.0 10e3/uL 5.6  10.8   Hemoglobin 13.3 - 17.7 g/dL 11.6 (L)  11.0 (L)   Hematocrit 40.0 - 53.0 % 33.9 (L)  32.5 (L)   Platelet Count 150 - 450 10e3/uL 105 (L)  111 (L)   RBC Count 4.40 - 5.90 10e6/uL 3.34 (L)  3.21 (L)   MCV 78 - 100 fL 102 (H)  101 (H)   MCH 26.5 - 33.0 pg 34.7 (H)  34.3 (H)   MCHC 31.5 - 36.5 g/dL 34.2  33.8   RDW 10.0 - 15.0 % 16.0 (H)  15.6 (H)   % Neutrophils % 43  52   % Lymphocytes % 45  36   % Monocytes % 11  10   % Eosinophils % 0  0   % Basophils % 0  0   Absolute Basophils 0.0 - 0.2 10e3/uL 0.0  0.0   Absolute Eosinophils 0.0 - 0.7 10e3/uL 0.0  0.0   Absolute Immature Granulocytes <=0.4 10e3/uL 0.1  0.2   Absolute Lymphocytes 0.8 - 5.3 10e3/uL 2.5  3.9   Absolute Monocytes 0.0 - 1.3 10e3/uL 0.6  1.1   % Immature Granulocytes % 1  2   Absolute Neutrophils 1.6 - 8.3 10e3/uL 2.4  5.7   Absolute NRBCs 10e3/uL 0.0  0.0   NRBCs per 100 WBC <1 /100 1 (H)  0   (L): Data is abnormally low  (H): Data is abnormally high

## 2022-12-06 NOTE — LETTER
12/6/2022         RE: Nik Nava  [address] [place] [id number]        Dear Colleague,    Thank you for referring your patient, Nik Nava, to the [facility] [place]. Please see a copy of my visit note below.    December 6, 2022    Just OOH  61yo 15 months from NMA allo sib PBSCT for Ph+ ALL with  ongoing challenges with GVHD (skin, mucosa, liver, and ocular most recently on prednisone and belumosudil), along with h/o of RCC s/p nephrectomy and c/b CKD who presented with malaise and dyspnea.     Brief Hospital Course and Plan:   Found to have some mild pulmonary abnormalities of the L lung on CT, and was treated with a treatment course of azithromycin. Pulmonary and ID consults were placed, both with some concern for possible belumosudil as the cause of his symptoms as opposed to his lung findings. No additional interventions were recommended for his ongoing influenza positivity. He was not able to take his home belumosudil during admission and his symptoms improved.    Further discussions on discontinuing Belumosudil with transition to possible alternatives can be had. Follow-up on inpatient labs include fungal studies and posaconazole level.     Interim History  Today feeling much better;  R eye no longer sore/swollen;   was given new scleral lens after first one was torn;   may need to bring new one back to ophthalmaology for repeat instruction on its use  Overall energy improved; no fcs. no bleeidng except torn fragile skin on arms  (puppy scratched him).   No new bone pain  No new rash except the bruises.  Eating ok   Some LE edema but not much change  recently started hygroton but still on low dose amlodipine.    /77 (BP Location: Right arm, Patient Position: Sitting, Cuff Size: Adult Large)   Pulse 85   Temp 98  F (36.7  C) (Oral)   Resp 18   Wt 111.6 kg (246 lb 1.6 oz)   SpO2 98%   BMI 32.47 kg/m    Wt Readings from Last 4  Encounters:   12/06/22 111.6 kg (246 lb 1.6 oz)   12/01/22 111.1 kg (245 lb)   11/29/22 113.4 kg (249 lb 14.4 oz)   11/22/22 117.8 kg (259 lb 9.6 oz)     Labls;   cbc ok   Creat a bit higher at 1.46; similar to recent range.  galactomann neg;  beta2 glucan couldn't run   hemolyzed.    Problems; and plan     HEME/COAG  #Secondary iron overload: per outpatient team plan is for phlebotomies 250ml every 4 weeks as tolerated (as long as Hgb >11). He did not require any transfusions during admission.     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 S/p allo sib PBSCT. (ABO matched)  HCT-CI score: 3 (prior solid tumor)  Remains in CR. See outpatient notes. TKI on hold given ongoing concerns with GVHD.     IMMUNOCOMPROMISED  #Influenza A: positive test on 11/23, s/p course of Tamiflu. Tested positive again on admission. ID was consulted and no plan for further interventions and unlikely to be causing his symptoms.     #Pulmonary infiltrates:   He has had numerous pulmonary findings on imaging previously. CT chest on admission showed scant tree-in-bud consolidations with some central cavitary lesions in the L lung. Pulmonary and ID were consulted with recommendations for empiric antibiotics. He completed a treatment course of azithromycin. Fungal workup was recommended and remains pending. He did not have sputum production so unable to test for additional etiologies of infection with AFB stain.  Cont posaconazole; repeat beta 2 d glucan but doubt active new fungal infection.        #Vaccinations & Prophylaxis    Vaccination status: Influenza vaccination after day +60, COVID vaccination after day +100, followed by vaccinations at 12, 14, 24, and 26 months.    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 See outpatient notes. He was started on belumosudil approximately six weeks  ago with almost immediate issues with malaise, exertional dyspnea, and anorexia. There is ongoing suspicion that these symptoms are related to the medication. Of note, he was not able to get his home medication administered during admission and his symptoms improved significantly.   He has restarted his belumosidil; no increase in his symptoms; not a good plan to change GVHD meds unless its clear that fatigue and felling unwell was due to the drug; Overlapped in tme with ifluenza and other problems    Plan- Continue prednisone 15mg (prolonged taper)  - Continue belumosudil for now as there is no good justification for changing meds now.     CARDIOVASCULAR  #Hypertension: Continue amlodipine 2.5mg daily     RESPIRATORY  # Pulmonary infiltrates, dyspnea, c/f PNA, recent Influenza  - See above  NO clear diagnosis.     #Smoking: Nicorette PRN     GI/NUTRITION    Ulcer prophylaxis: Protonix    Risk of malnutrition: RD consulted     RENAL/ELECTROLYTES/  #CKD: Recent baseline 1.2-1.4. Hx RCC s/p R nephrectomy in 2007. Creatinine stable during admission.  #Hyponatremia: Decreased PO intake,  on chlorthalidone. Restarted on discharge given some improvement and improved appetite.  Very low dose amlodipine; BP ok;   Hold amlodipine in case it is contributing to his LE edema.     DIABETES/ENDOCRINE  #Graves' disease: Continue Synthroid.  TSH very suppressed    RTC 1 week;  visit and labs; Pt to call if new sx develop    Chapo Hill MD         Latest Reference Range & Units 12/03/22 04:52 12/03/22 11:25 12/06/22 14:05   Sodium 136 - 145 mmol/L 133 (L)  136   Potassium 3.4 - 5.3 mmol/L 4.7  4.3   Chloride 98 - 107 mmol/L 99  100   Carbon Dioxide (CO2) 22 - 29 mmol/L 20 (L)  26   Urea Nitrogen 8.0 - 23.0 mg/dL 37.4 (H)  42.2 (H)   Creatinine 0.67 - 1.17 mg/dL 1.33 (H)  1.46 (H)   GFR Estimate >60 mL/min/1.73m2 60 (L)  53 (L)   Calcium 8.8 - 10.2 mg/dL 9.4  9.5   Anion Gap 7 - 15 mmol/L 14  10   Magnesium 1.7 - 2.3  mg/dL 2.5 (H)  2.1   Albumin 3.5 - 5.2 g/dL   3.8   Protein Total 6.4 - 8.3 g/dL   6.2 (L)   Alkaline Phosphatase 40 - 129 U/L   121   ALT 10 - 50 U/L   42   AST 10 - 50 U/L   46   Bilirubin Total <=1.2 mg/dL   0.8   Glucose 70 - 99 mg/dL 101 (H)  118 (H)   GLUCOSE BY METER POCT 70 - 99 mg/dL  116 (H)    WBC 4.0 - 11.0 10e3/uL 5.6  10.8   Hemoglobin 13.3 - 17.7 g/dL 11.6 (L)  11.0 (L)   Hematocrit 40.0 - 53.0 % 33.9 (L)  32.5 (L)   Platelet Count 150 - 450 10e3/uL 105 (L)  111 (L)   RBC Count 4.40 - 5.90 10e6/uL 3.34 (L)  3.21 (L)   MCV 78 - 100 fL 102 (H)  101 (H)   MCH 26.5 - 33.0 pg 34.7 (H)  34.3 (H)   MCHC 31.5 - 36.5 g/dL 34.2  33.8   RDW 10.0 - 15.0 % 16.0 (H)  15.6 (H)   % Neutrophils % 43  52   % Lymphocytes % 45  36   % Monocytes % 11  10   % Eosinophils % 0  0   % Basophils % 0  0   Absolute Basophils 0.0 - 0.2 10e3/uL 0.0  0.0   Absolute Eosinophils 0.0 - 0.7 10e3/uL 0.0  0.0   Absolute Immature Granulocytes <=0.4 10e3/uL 0.1  0.2   Absolute Lymphocytes 0.8 - 5.3 10e3/uL 2.5  3.9   Absolute Monocytes 0.0 - 1.3 10e3/uL 0.6  1.1   % Immature Granulocytes % 1  2   Absolute Neutrophils 1.6 - 8.3 10e3/uL 2.4  5.7   Absolute NRBCs 10e3/uL 0.0  0.0   NRBCs per 100 WBC <1 /100 1 (H)  0   (L): Data is abnormally low  (H): Data is abnormally high         Sincerely,    No name on file   Complex Repair And Graft Additional Text (Will Appearing After The Standard Complex Repair Text): The complex repair was not sufficient to completely close the primary defect. The remaining additional defect was repaired with the graft mentioned below.

## 2022-12-06 NOTE — NURSING NOTE
Chief Complaint   Patient presents with     Port Draw     Labs drawn via port by RN in lab. VS taken.      Port accessed with 20 gauge 3/4 inch flat needle and labs drawn by RN.  Port flushed with saline and heparin.  Pt tolerated well.  VS taken.  Pt checked in for next appt.    Tamie Pruett RN

## 2022-12-07 NOTE — NURSING NOTE
"Oncology Rooming Note    December 7, 2022 10:18 AM   Nik Nava is a 62 year old male who presents for:    Chief Complaint   Patient presents with     Port Draw     Labs drawn via port by RN in lab. VS taken.      Oncology Clinic Visit     [facility] RETURN - ALL     Initial Vitals: /77 (BP Location: Right arm, Patient Position: Sitting, Cuff Size: Adult Large)   Pulse 85   Temp 98  F (36.7  C) (Oral)   Resp 18   Wt 111.6 kg (246 lb 1.6 oz)   SpO2 98%   BMI 32.47 kg/m   Estimated body mass index is 32.47 kg/m  as calculated from the following:    Height as of 12/1/22: 1.854 m (6' 1\").    Weight as of this encounter: 111.6 kg (246 lb 1.6 oz). Body surface area is 2.4 meters squared.  No Pain (0) Comment: Data Unavailable   No LMP for male patient.  Allergies reviewed: Yes  Medications reviewed: Yes    Medications: Medication refills not needed today.  Pharmacy name entered into Funding Options:    [facility] - [place], [place] - [address] [facility] [place], [place] - [id number]  [facility]    Kelvin Gomez LPN            "

## 2022-12-11 ENCOUNTER — TELEPHONE (OUTPATIENT)
Dept: ONCOLOGY | Facility: CLINIC | Age: 62
End: 2022-12-11

## 2022-12-11 NOTE — TELEPHONE ENCOUNTER
"BMT Brief Telephone Note:    Subjective: Called by patient's wife, Lamar, regarding a fall yesterday.    Patient was applying eyedrops yesterday, tilting head back, and then fell. Had trauma to the neck area, but not really the head. Uncertain whether he lost consciousness or not. Initially told his wife no, but then said \"next thing he knew\" he was on the ground. No confusion or possible post-ictal state. No bleeding, just some swelling around the site of trauma.     His wife also wonders about whether recent changes to his medications could be causing the fall, plus possibly contributing to his more chronic and non-specific symptoms. His BP is currently well controlled, typically in the 110s for systolics.    A/P: 63yo M s/p BMT with GVHD known to this author from recent admission, who had a likely vasovagal fall yesterday without any worrisome sequelae.    Advised to reach out to us if he was recurrent symptoms. Discussed that it is unlikely that his fall or other chronic issues are related to chlorthalidone (and/or holding amlodipine) and that it is reassuring that his BP is well-controlled. Has had some hyponatremia recently, and he has a follow-up on Tuesday with labs where his sodium can be checked to determine whether chlorthalidone is causing any electrolyte abnormalities.    Stefan Conklin MD PhD  Hematology/Oncology Fellow  Pgr: [phone]    "

## 2022-12-12 RX ORDER — PENTAMIDINE ISETHIONATE 300 MG/300MG
300 INHALANT RESPIRATORY (INHALATION)
Status: CANCELLED
Start: 2022-12-22

## 2022-12-12 RX ORDER — ALBUTEROL SULFATE 0.83 MG/ML
2.5 SOLUTION RESPIRATORY (INHALATION)
Status: CANCELLED
Start: 2022-12-12

## 2022-12-12 RX ORDER — PENTAMIDINE ISETHIONATE 300 MG/300MG
300 INHALANT RESPIRATORY (INHALATION)
Status: CANCELLED
Start: 2022-12-12

## 2022-12-12 RX ORDER — ALBUTEROL SULFATE 0.83 MG/ML
2.5 SOLUTION RESPIRATORY (INHALATION)
Status: CANCELLED
Start: 2022-12-22

## 2022-12-12 NOTE — PROGRESS NOTES
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    HPI:    He returns today mostly feeling okay although his major complaints remain his eyes, and ongoing fatigue and malaise.  Eyes bother him through the day requiring eyedrops quite frequently.  At times he needs to stop and just keep his eyes closed and it certainly affects his activities of daily living.  He recently saw his ophthalmologist who feels that his eye findings are actually improved.  He continues to have mild dry mouth that does not interfere with his ability to eat and he uses the mouth rinse intermittently.  His skin remains pigmented and smooth but without acute rash or scleroderma.  He notices significant fatigue and malaise which she believes is due to the belumosidil although he is also recently had influenza and was admitted to the hospital.      Review of Systems                                                                                                                                         12 point Review of systems was negative except as detailed above     PHYSICAL EXAM                                                                                                                                                 KPS: 80  /78 (BP Location: Right arm)   Pulse 77   Temp 98.1  F (36.7  C) (Oral)   Resp 16   Wt 111.1 kg (245 lb)   SpO2 97%   BMI 32.32 kg/m    Blood pressure at home, generally systolic blood pressure less than 145    Wt Readings from Last 4 Encounters:   12/13/22 111.1 kg (245 lb)   12/06/22 111.6 kg (246 lb 1.6 oz)   12/01/22 111.1 kg (245 lb)   11/29/22 113.4 kg (249 lb 14.4 oz)      General: NAD.  HEENT: sclera anicteric, injected conjunctivae. No lichenoid changes in oral mucosa, no ulcers seen.    Lungs:  CTA b/l  no wheezes  CV: RRR  no gallop  Abd; soft, NABS, protuberant  Ext/ Skin: Skin bruising on bilateral  forearms and previous skin tears have now healed, Bites also healed    LE 1+ bilateral edema in lower extremities; wearing compression socks-improved  Access: port-a-cath    cGVHD therapy started on February 24 2022  - Baseline NIH scoring 2/24/2022 : skin 2 maculopapular rash/erythema with no sclerotic features, mouth score 1 mild symptoms with lichenoid changes less than 25%, eyes score 2 moderate symptoms without new visual impairments due to KCS requiring lubricant eyedrops more than 3 times a day, overall mild scale for her provider  - 3/25/2022 1 month NIH treatment assessment on PQRST: skin 2, mouth score 1 mild symptoms with NO lichenoid changes, eyes score 2 moderate symptoms w requiring lubricant eyedrops more than 3 times a day, liver score 1 ALT 3.7x ULN and nl bilirubin   - 3/31  Mouth clear; eyes minimal LFT still abn; no joint or new GI sx  - 4/28 Mouth clear, Left>R eye is mild sclera erythema, lids are pink and irritated appearing, LFT's slow improvement, no new joint, skin, or GI symptoms.   -5/11 mouth with mild erythema but no lichenoid changes, eyes using eyedrops 4-6 times a day score of 2, LFTs significantly improved from baseline slight elevation in alk phos and AST with normal bilirubin, skin with hyperpigmentation from old acute GVHD no active skin rashes, no GI symptoms no musculoskeletal complaints.  -5/26 Mouth with very slight lichenoid changes, erythema.  Eyes still using eyedrops 4-6x per day.  LFTs essentially normal with slight elevation in alk phos.  Skin with hyperpigmentation from old acute GVHD, no active rashes, no GI or MSK concerns.  Skin 0, mouth 0 but with lichenoid changes, eyes 2  -6/7/22 Mouth with no lichenoid changes, erythema.  Eyes still using eyedrops 4-6x per day.  LFTs essentially normal with slight elevation in alk phos.  Skin with hyperpigmentation from old acute GVHD, no active rashes, no GI or MSK concerns.  Skin 0, mouth 0, eyes 2     -6 month PQRST  assessment 9/6/2022: eyes 3, mouth 0 for symptoms, 25% lichenoid changes (1), liver/GI/skin 0    11/8/2022, 11/22/2022, 12/13/22 cGVHD NIG scoring eyes 3, no other organ involvement clinically    ASSESSMENT AND PLAN   Nik Nava is a 61 year old male with Ph Pos ALL, day 490 s/p sib allo stem cell transplant    Day -6 (8/4): flu/cy  Day -5 through day -2 (8/5-8/8): flu  Day -1 (8/9): TBI  Day 0 (8/10): transplant     1.  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 S/p allo sib PBSCT. (ABO matched)  HCT-CI score: 3 (prior solid tumor)  Day +100 bone marrow biopsy is 100% donor with no morphologic or flow cytometric evidence of leukemia BCR abl is pending.  - Day +180 restaging BM bx- still in CR  100% donor;  2/16/22 BCR ABL major breakpoint undetectable.   - 1 year restaging 8/18/2022: Markedly hypocellular bone marrow [less than 10% cellular] with maturing trilineage hematopoiesis and no definite morphologic or immunophenotypic evidence for involvement by B lymphoblastic leukemia. BCRABL1 PCR not detected, bone marrow % donor. FISH NORMAL No evidence of BCR-ABL1 fusion  - Maintenance with TKI posttransplantation given his Ph positive ALL that have not been started previously presumed secondary to multiple active issues specifically infections and COVID.  Per Avila Tobar: will reconsider this patient will think about whether this is something he would like to consider he was previously on Dasatinib, we would start on a low dose and increase as tolerated.  This is on hold now pending active GVHD therapy, we discussed on this visit 10/18 in agreement with holding off.     2.  HEME:  - Pulmonary emboli: He developed a pulmonary emboli in the setting of receiving PEG asparaginase (ie provoked thrombus).  Xarelto complete.   -At 1 year anniversary visit completed screening for secondary iron overload, 8/2022 ferritin 1023, recheck on 10/18 increased to 2348, expect this is  acute phase reactant with recent GVHD flare.    - 11/8 ferritin 2812, MRI completed 11/2022 confirming iron overload, therefore today 11/22 I discussed with the patient starting phlebotomies as I worry about his tolerance to iron chelators specifically regarding his renal function, will start phlebotomies 250 mL every 4 weeks as tolerated and plan to keep his hemoglobin more than 11-volume can be titrated as well as frequency depending on his tolerance and hemoglobin as well as follow-up ferritin levels.      3.  FEN/Renal:  - Cr improved with switching patient to sirolimus briefly, then with increased Cr on 10/11 with third spacing. Total protein to creatinine ratio 0.41 on 10/11 recheck 0.39 on 10/18. 11/8 creatinine up to 1.4, 11/22 creatinine down to 1.25   - He has a history of renal cancer and resection. Has a single kidney.  - 11/1/2022 seen by nephrology: multifactorial, focus on BP control, Started Chlorthalidone 12.5 mg daily and reduce amlodipine to 2.5 mg per day, Low salt diet and reduce ice tea. Next step: may increase chlorthalidone if Cr stable but if not improving in swelling, may start torsemide Follow-up in 2 month   -Hypomagnesemia,11/8 decrease magnesium to 2 tablets 2 times a day-seems to have triggered more muscle cramps specifically in his right arm cussed with the patient uptitrating that back up for symptom control of his cramps, he continues on vitamin E for muscle cramps as well.     4.    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   #Skin GVHD- history of biopsy proven GVHD of the skin 8/30/2021; resolved.   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 WNL today 11/22  # Ocular GVHD: follows with ophthalmology. Maxitrol to lids, continue refreshing drops QID. Score 3  Followed closely by Dr. Juarez and had ocular fittings 11/8 however no lens seen on exam today he will follow-up with her team for refitting   # Oral  GVHD: dex s/s three-4 times a day; tac ointment to lips as needed.  Clotrimazole cream added after seeing dermatology.  Lichenoid changes have resolved with no other ulcers since 11/8     Previous therapies  Tacrolimus-previously decided to stay on same dose, no checks of levels if clinically stable, and no need switch to sirolimus. (keep tac low as gvhd is quiet and viremia). 5/10 level 45 with starting of COVID therapy and D-D intractions (Paxlovid for COVID19, which has a drug interaction with tacrolimus-Ritonavir increases serum levels of tacrolimus).   Tacrolimus level subtherapeutic, increase to 2.5 mg twice daily recently, 8/23/2022 instructed to change tacrolimus to 2 mg twice daily. 9/6 with mild NEGRA, hyperkalemia, hypomagnesemia, and reports some tremors and cramps, suspect tacrolimus to be high therefore empirically decreased to 1.5 twice daily, skip morning dose of tacrolimus and took 2 mg today after his afternoon labs. Decrease to 1mg BID on Saturday.  Sirolimus  9/21 despite fluids and a dose adjustment of tacrolimus patient seem to have persistence NORBERTO related NEGRA, therefore after discussion with the patient decision was made to transition to sirolimus that was started on 9/21, patient initially seem to tolerate this well with normalization of kidney function  10/11 presented with flares of ocular and oral GVHD, fluid retention and gain of 5-6 kg, lower extremity edema, abdominal distention, total protein to creatinine ratio elevated at 0.4, in addition to elevation in BUN and creatinine, HTN.  Picture most consistent with sirolimus related side effects.  Less likely that the patient will tolerate even a lower level of sirolimus.  Therefore the patient was instructed to stop sirolimus, last dose on 10/10. 10/14 level 3.5 appropriately trending down.     Corticosteroids  3/1-3/16: prednisone 100mg every day  3/17 75mg every day   3/31 75 alt with 50  4/6: 75 alt with 25mg every other day  4/12 pred  tapered to 60 alternating with 25mg   4/15 In setting of viruses trying to reactivate,GVHD stable: Taper 60/15mg alternating.  4/20 decrease pred further to 50/10.    4/25 taper pred to 50/0 every other day as long as symptoms stable.   5/2 Pred decrease 40/0 alternating every other day.   5/13 decrease to 30/0  5/20 decrease to 20/0 then slowly by 5 mg weekly  6/7 decrease to 10/0  Went up to 15/0 with mild increased LFTs  8/23/2022 increased to 20/0, with persistence of oral ulcers and active ocular GVHD.  Discussed with the patient the importance of stopping chewing of nicotine gums for prolonged hours as that would not help with the healing of the oral ulcers.  I discussed with the patient alternatives of nicotine patches that he will reconsider and let me know.  9/6 decreased to 15 alternating with 0  9/13 decreased to 10 alternating with 0  9/21 discontinued tacrolimus given persistent NERGA and single kidney and start the patient on sirolimus without loading dose.  We will get a list of possible side effects and adverse events from the Pharm.D. see sirolimus section above.  10/11 GVHD flare. Sirolimus stopped. Resume prednisone 40 mg daily as of 10/12, no change with mildly worsening eye pain 10/14  Reduce pred to 35mg 10/25 and 25mg 11/1 11/8 20 mg   11/22 15 mg  12/13/22 10 mg (plan to taper to 5mg then slow taper 1 mg per month given long pred history)    Belumosudil  Patient intolerant/with disease flares on tacrolimus and sirolimus see above.  Discussed with the patient the different options for therapy of chronic GVHD that are FDA approved.  Given the side effect profiles after discussing in detail and given the least nephrotoxicity and expected response, taking into account other metabolic effect as well.  We will proceed with prior authorization with belumosudil.  Patient was given material to review for possible expected adverse events and side effects with both Belumosodil and ruxolitinib.   ---  Started on 10/18 in p.m. - feeling tired with malaise since starting and worried the drug is responsible     5.  ID: Afebrile with no current signs or symptoms of infection.  He recently was admitted following influenza and is now improved    # cat bite/scratch: doxy 100mg BID x 10 days; completes 11/3/22.  Bite has healed, no signs of infection.     #History of COVID   Positive via homed test 5/8. Treated with Paxlovid with resolution of symptoms.  Had recurrence on 5/18. Resolved  Covid 11/21, 12/21, received his influenza annual vaccine as well as COVID boosters locally 11/16     #History of pulmonary infiltrates:   4/20 CT chest with new RUL infiltrate. Highly immunocompromised. 4/22 Fungitell/asp GM were neg. BAL 4/29 NGTD, increased micafungin to 150mg IV daily-- f/u 6/1 Dr Garcia CT with mixed results, followed with Dr. Garcia August 2022 with resolution of pulmonary nodules and normalization of LFTs we will switch patient will switch patient to posaconazole prophylactic dose and monitor LFTs and any infectious concerns closely (wnl stable 10/14)     #History of CMV viremia:    - CMV viremia up to 1100. 4/15 started valcyte 900mg PO BID. 4/20 CMV <137, 5/10 ND, decreased to 450mg BID 4/30 - continue 4 weeks as long as CMV <137 till 5/28 + weekly CMV  - Switch to acyclovir after completion of current therapy 5/28. 10/11 CMV ND. Check monthly on IST, 11/8 CMV <137, 11/22 ND, recheck 12/8     - PPx:   ACV  Posaconazole (previously on micafungin with LFts abl, hx of pulmonary nodules needign treatment dose).   Pentamidine, last 11/22  Azithro 250mg daily (stopped levaquin due to possible tendonitis).      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 S/p 4/22 and 5/4/22 rituximab 375mg/m2 5/10 ND x2, 6/7 ND, 8/18/2022 971, 10/11 ND, check every other month on IST, 11/8 ND  S/p covid vaccine series 12/2021  S/p anuradha   Deferred annual vaccines in the setting of GVHD and immunosuppression therapy     6.  Endo:   - Hx of graves disease; On synthroid 175 mcg daily. TSH normal 4/6/2022 no reflex to T4.  Recheck on 10/18 T3 low at 0.03, free T3 and T4 within normal.  Subclinical hyperthyroidism, 11/22/222 and discussion with endocrine, reduce the levothyroxine to 150 mcg/day and repeat labs in 2-3.  He has follow-up appointment with endocrine    - HLD: 11/2022 cholesterol 355, triglycerides 153, -- 11/8 started rosuvastatin 5 mg daily we will recheck lipid profile in 3 months, 11/22 CPK within normal     7. CV:   - Hypertension history - now on low side after weight loss  - TTE most recently 10/2022     8. GI:   -Omeprazole for heartburn  -LFTs as above, now normal. 9/6 decreased ursodiol to daily      9. Psych:   - Situational anxiety - lexapro 10mg daily.   - Insomnia: worse on steroids. Ativan, trazadone, melatonin     10. MSK: left food drop, PT. Occasional muscle cramps discussed optimizing vitamin D levels and considering vitamin D, some of the symptomatology of muscle cramps are likely related to chronic GVHD.     11. HCM/Age appropriate cancer screening:  -Discussed with the patient importance of age-appropriate cancer screening including colonoscopies, he is due for this.  -Discussed importance of at least once a year vitamin D level check, 8/18/2022 wnl  -Screening for secondary iron overload, see heme section above, start vitamins on 12/13  -Yearly TSH 2022 wnl; yearly lipid panel - see GI section above  -9/6/2022 DEXA scan Based on BMD diagnosis is consistent with normal bone density based on WHO criteria Ref. 1   -PFTs 9/20/22, see above  -Metabolic other, 8/2022 testosterone within normal  -11/22/2022 discussed with the patient the challenges and the stresses of chronic illness and healthcare fatigue.  Patient had previously been on Lexapro that we restarted today confirmed with pharmacy that there are no drug drug interactions.  Additionally we will refer patient to PMNR to help with physical rehab  and strength to help with fatigue.  Our social workers will also reach out to Nik and his wife for further resources including support groups for patients with chronic illness and fatigue post transplant.       Plan    -Decrease prednisone from 15 mg to 10 mg  - stop chlorthalidone - he will monitor his weight and BP  - he will avoid pushing free water and diversify his fluids  -He has monthly pentamadine next appointment scheduled  -Continue rosuvastatin 5 mg daily   -Follow-up with nephrology as well as Follow up with Dr. Juarez for eyes  - RTC two weeks with GIRISH to follow up on Na, BP and pred change, and 4 weeks with MD      I spent 45 minutes in the care of this patient today, which included time necessary for preparation for the visit, obtaining history, ordering medications/tests/procedures as medically indicated, review of pertinent medical literature, counseling of the patient, communication of recommendations to the care team, and documentation time.    DOMINIQUE BRITTON MD  December 13, 2022

## 2022-12-13 ENCOUNTER — APPOINTMENT (OUTPATIENT)
Dept: LAB | Facility: CLINIC | Age: 62
End: 2022-12-13
Attending: INTERNAL MEDICINE
Payer: COMMERCIAL

## 2022-12-13 ENCOUNTER — OFFICE VISIT (OUTPATIENT)
Dept: OPTOMETRY | Facility: CLINIC | Age: 62
End: 2022-12-13
Payer: COMMERCIAL

## 2022-12-13 ENCOUNTER — ONCOLOGY VISIT (OUTPATIENT)
Dept: TRANSPLANT | Facility: CLINIC | Age: 62
End: 2022-12-13
Attending: INTERNAL MEDICINE
Payer: COMMERCIAL

## 2022-12-13 VITALS
BODY MASS INDEX: 32.32 KG/M2 | TEMPERATURE: 98.1 F | OXYGEN SATURATION: 97 % | WEIGHT: 245 LBS | HEART RATE: 77 BPM | SYSTOLIC BLOOD PRESSURE: 133 MMHG | RESPIRATION RATE: 16 BRPM | DIASTOLIC BLOOD PRESSURE: 78 MMHG

## 2022-12-13 DIAGNOSIS — C91.01 ACUTE LYMPHOBLASTIC LEUKEMIA (ALL) IN REMISSION (H): ICD-10-CM

## 2022-12-13 DIAGNOSIS — D89.813 GVHD AS COMPLICATION OF BONE MARROW TRANSPLANT (H): ICD-10-CM

## 2022-12-13 DIAGNOSIS — Z94.81 S/P BONE MARROW TRANSPLANT (H): ICD-10-CM

## 2022-12-13 DIAGNOSIS — T86.09 GVHD AS COMPLICATION OF BONE MARROW TRANSPLANT (H): ICD-10-CM

## 2022-12-13 DIAGNOSIS — Z94.81 STATUS POST BONE MARROW TRANSPLANT (H): ICD-10-CM

## 2022-12-13 DIAGNOSIS — H04.129 DRY EYE: Primary | ICD-10-CM

## 2022-12-13 DIAGNOSIS — H16.123 FILAMENTARY KERATITIS OF BOTH EYES: ICD-10-CM

## 2022-12-13 LAB
ALBUMIN SERPL BCG-MCNC: 4.1 G/DL (ref 3.5–5.2)
ALP SERPL-CCNC: 116 U/L (ref 40–129)
ALT SERPL W P-5'-P-CCNC: 40 U/L (ref 10–50)
ANION GAP SERPL CALCULATED.3IONS-SCNC: 10 MMOL/L (ref 7–15)
AST SERPL W P-5'-P-CCNC: 33 U/L (ref 10–50)
BASOPHILS # BLD AUTO: 0 10E3/UL (ref 0–0.2)
BASOPHILS NFR BLD AUTO: 0 %
BILIRUB SERPL-MCNC: 1.1 MG/DL
BUN SERPL-MCNC: 37.6 MG/DL (ref 8–23)
CALCIUM SERPL-MCNC: 10.2 MG/DL (ref 8.8–10.2)
CHLORIDE SERPL-SCNC: 93 MMOL/L (ref 98–107)
CREAT SERPL-MCNC: 1.28 MG/DL (ref 0.67–1.17)
DEPRECATED HCO3 PLAS-SCNC: 25 MMOL/L (ref 22–29)
EOSINOPHIL # BLD AUTO: 0 10E3/UL (ref 0–0.7)
EOSINOPHIL NFR BLD AUTO: 0 %
ERYTHROCYTE [DISTWIDTH] IN BLOOD BY AUTOMATED COUNT: 15 % (ref 10–15)
GFR SERPL CREATININE-BSD FRML MDRD: 62 ML/MIN/1.73M2
GLUCOSE SERPL-MCNC: 95 MG/DL (ref 70–99)
HCT VFR BLD AUTO: 30.6 % (ref 40–53)
HGB BLD-MCNC: 11 G/DL (ref 13.3–17.7)
IMM GRANULOCYTES # BLD: 0.1 10E3/UL
IMM GRANULOCYTES NFR BLD: 1 %
LYMPHOCYTES # BLD AUTO: 2.3 10E3/UL (ref 0.8–5.3)
LYMPHOCYTES NFR BLD AUTO: 28 %
MAGNESIUM SERPL-MCNC: 1.8 MG/DL (ref 1.7–2.3)
MCH RBC QN AUTO: 35.4 PG (ref 26.5–33)
MCHC RBC AUTO-ENTMCNC: 35.9 G/DL (ref 31.5–36.5)
MCV RBC AUTO: 98 FL (ref 78–100)
MONOCYTES # BLD AUTO: 0.7 10E3/UL (ref 0–1.3)
MONOCYTES NFR BLD AUTO: 8 %
NEUTROPHILS # BLD AUTO: 5.2 10E3/UL (ref 1.6–8.3)
NEUTROPHILS NFR BLD AUTO: 63 %
NRBC # BLD AUTO: 0 10E3/UL
NRBC BLD AUTO-RTO: 0 /100
PLATELET # BLD AUTO: 102 10E3/UL (ref 150–450)
POTASSIUM SERPL-SCNC: 5.3 MMOL/L (ref 3.4–5.3)
PROT SERPL-MCNC: 6.4 G/DL (ref 6.4–8.3)
RBC # BLD AUTO: 3.11 10E6/UL (ref 4.4–5.9)
SODIUM SERPL-SCNC: 128 MMOL/L (ref 136–145)
WBC # BLD AUTO: 8.3 10E3/UL (ref 4–11)

## 2022-12-13 PROCEDURE — 83735 ASSAY OF MAGNESIUM: CPT

## 2022-12-13 PROCEDURE — 87449 NOS EACH ORGANISM AG IA: CPT

## 2022-12-13 PROCEDURE — G0463 HOSPITAL OUTPT CLINIC VISIT: HCPCS

## 2022-12-13 PROCEDURE — 99215 OFFICE O/P EST HI 40 MIN: CPT

## 2022-12-13 PROCEDURE — 250N000011 HC RX IP 250 OP 636: Performed by: INTERNAL MEDICINE

## 2022-12-13 PROCEDURE — 80053 COMPREHEN METABOLIC PANEL: CPT

## 2022-12-13 PROCEDURE — 85025 COMPLETE CBC W/AUTO DIFF WBC: CPT

## 2022-12-13 PROCEDURE — 36591 DRAW BLOOD OFF VENOUS DEVICE: CPT

## 2022-12-13 RX ORDER — HEPARIN SODIUM (PORCINE) LOCK FLUSH IV SOLN 100 UNIT/ML 100 UNIT/ML
500 SOLUTION INTRAVENOUS ONCE
Status: COMPLETED | OUTPATIENT
Start: 2022-12-13 | End: 2022-12-13

## 2022-12-13 RX ORDER — POSACONAZOLE 100 MG/1
300 TABLET, DELAYED RELEASE ORAL EVERY MORNING
Qty: 90 TABLET | Refills: 3 | Status: SHIPPED | OUTPATIENT
Start: 2022-12-13 | End: 2023-04-25

## 2022-12-13 RX ADMIN — Medication 500 UNITS: at 13:57

## 2022-12-13 ASSESSMENT — REFRACTION_CURRENTRX
OD_ADDL_SPECS: OPT EXTRA CLEAR
OD_SPHERE: -2.50
OD_DIAMETER: 14.9
OD_BASECURVE: 8.2
OD_BASECURVE: 7.8
OD_SPHERE: +2.75
OD_BRAND: ONEFIT
OD_DIAMETER: 15.2
OD_BRAND: ONEFIT

## 2022-12-13 ASSESSMENT — VISUAL ACUITY
OS_SC+: +2
METHOD: SNELLEN - LINEAR
OD_SC: 20/30
OS_SC: 20/40

## 2022-12-13 ASSESSMENT — PAIN SCALES - GENERAL: PAINLEVEL: NO PAIN (0)

## 2022-12-13 ASSESSMENT — SLIT LAMP EXAM - LIDS
COMMENTS: 1+ BLEPH, THICKENED MARGINS, 1-2+ MGD
COMMENTS: 1+ BLEPH, THICKENED MARGINS, 1-2+ MGD

## 2022-12-13 NOTE — NURSING NOTE
"Chief Complaint   Patient presents with     Port Draw     Labs drawn via port by RN. Vitals taken.     Port accessed with 20G 3/4\" flat needle by RN, labs collected, line flushed with saline and heparin.  Vitals taken. Pt checked in for appointment(s).    Josiane Ceja RN    "

## 2022-12-13 NOTE — PATIENT INSTRUCTIONS
CLEAN/STORE IN:  -Tangible Clean  -Grandy Simplus  (Okay to get into the eye)    FILL WITH:  -Purilens (bottle)  -Lacripure saline vials  -Nutrifill saline vials.   -0.9% saline vials

## 2022-12-13 NOTE — LETTER
12/13/2022         RE: Nik Nava  [id number] [facility] [id number]        Dear Colleague,    Thank you for referring your patient, Nik Nava, to the [facility] AND MARROW [facility]ANSPLANT [facility] [place]. Please see a copy of my visit note below.        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    HPI:    He returns today mostly feeling okay although his major complaints remain his eyes, and ongoing fatigue and malaise.  Eyes bother him through the day requiring eyedrops quite frequently.  At times he needs to stop and just keep his eyes closed and it certainly affects his activities of daily living.  He recently saw his ophthalmologist who feels that his eye findings are actually improved.  He continues to have mild dry mouth that does not interfere with his ability to eat and he uses the mouth rinse intermittently.  His skin remains pigmented and smooth but without acute rash or scleroderma.  He notices significant fatigue and malaise which she believes is due to the belumosidil although he is also recently had influenza and was admitted to the hospital.      Review of Systems                                                                                                                                         12 point Review of systems was negative except as detailed above     PHYSICAL EXAM                                                                                                                                                 KPS: 80  /78 (BP Location: Right arm)   Pulse 77   Temp 98.1  F (36.7  C) (Oral)   Resp 16   Wt 111.1 kg (245 lb)   SpO2 97%   BMI 32.32 kg/m    Blood pressure at home, generally systolic blood pressure less than 145    Wt Readings from Last 4 Encounters:   12/13/22 111.1 kg (245 lb)   12/06/22 111.6 kg (246 lb 1.6 oz)   12/01/22 111.1 kg (245  lb)   11/29/22 113.4 kg (249 lb 14.4 oz)      General: NAD.  HEENT: sclera anicteric, injected conjunctivae. No lichenoid changes in oral mucosa, no ulcers seen.    Lungs:  CTA b/l  no wheezes  CV: RRR  no gallop  Abd; soft, NABS, protuberant  Ext/ Skin: Skin bruising on bilateral forearms and previous skin tears have now healed, Bites also healed    LE 1+ bilateral edema in lower extremities; wearing compression socks-improved  Access: port-a-cath    cGVHD therapy started on February 24 2022  - Baseline NIH scoring 2/24/2022 : skin 2 maculopapular rash/erythema with no sclerotic features, mouth score 1 mild symptoms with lichenoid changes less than 25%, eyes score 2 moderate symptoms without new visual impairments due to KCS requiring lubricant eyedrops more than 3 times a day, overall mild scale for her provider  - 3/25/2022 1 month NIH treatment assessment on PQRST: skin 2, mouth score 1 mild symptoms with NO lichenoid changes, eyes score 2 moderate symptoms w requiring lubricant eyedrops more than 3 times a day, liver score 1 ALT 3.7x ULN and nl bilirubin   - 3/31  Mouth clear; eyes minimal LFT still abn; no joint or new GI sx  - 4/28 Mouth clear, Left>R eye is mild sclera erythema, lids are pink and irritated appearing, LFT's slow improvement, no new joint, skin, or GI symptoms.   -5/11 mouth with mild erythema but no lichenoid changes, eyes using eyedrops 4-6 times a day score of 2, LFTs significantly improved from baseline slight elevation in alk phos and AST with normal bilirubin, skin with hyperpigmentation from old acute GVHD no active skin rashes, no GI symptoms no musculoskeletal complaints.  -5/26 Mouth with very slight lichenoid changes, erythema.  Eyes still using eyedrops 4-6x per day.  LFTs essentially normal with slight elevation in alk phos.  Skin with hyperpigmentation from old acute GVHD, no active rashes, no GI or MSK concerns.  Skin 0, mouth 0 but with lichenoid changes, eyes 2  -6/7/22 Mouth  with no lichenoid changes, erythema.  Eyes still using eyedrops 4-6x per day.  LFTs essentially normal with slight elevation in alk phos.  Skin with hyperpigmentation from old acute GVHD, no active rashes, no GI or MSK concerns.  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  Acute lymphoblastic leukemia, [place] chromosome positive in CR, MRD neg. S/p allo sib PBSCT. (ABO matched)  HCT-CI score: 3 (prior solid tumor)  Day +100 bone marrow biopsy is 100% donor with no morphologic or flow cytometric evidence of leukemia BCR abl is pending.  - Day +180 restaging BM bx- still in CR  100% donor;  2/16/22 BCR ABL major breakpoint undetectable.   - 1 year restaging 8/18/2022: Markedly hypocellular bone marrow [less than 10% cellular] with maturing trilineage hematopoiesis and no definite morphologic or immunophenotypic evidence for involvement by B lymphoblastic leukemia. BCRABL1 PCR not detected, bone marrow % donor. FISH NORMAL No evidence of BCR-ABL1 fusion  - Maintenance with TKI posttransplantation given his Ph positive ALL that have not been started previously presumed secondary to multiple active issues specifically infections and COVID.  Per Avila Tobar: will reconsider this patient will think about whether this is something he would like to consider he was previously on Dasatinib, we would start on a low dose and increase as tolerated.  This is on hold now pending active GVHD therapy, we discussed on this visit 10/18 in agreement with holding off.     2.  HEME:  - Pulmonary emboli: He  developed a pulmonary emboli in the setting of receiving PEG asparaginase (ie provoked thrombus).  Xarelto complete.   -At 1 year anniversary visit completed screening for secondary iron overload, 8/2022 ferritin 1023, recheck on 10/18 increased to 2348, expect this is acute phase reactant with recent GVHD flare.    - 11/8 ferritin 2812, MRI completed 11/2022 confirming iron overload, therefore today 11/22 I discussed with the patient starting phlebotomies as I worry about his tolerance to iron chelators specifically regarding his renal function, will start phlebotomies 250 mL every 4 weeks as tolerated and plan to keep his hemoglobin more than 11-volume can be titrated as well as frequency depending on his tolerance and hemoglobin as well as follow-up ferritin levels.      3.  FEN/Renal:  - Cr improved with switching patient to sirolimus briefly, then with increased Cr on 10/11 with third spacing. Total protein to creatinine ratio 0.41 on 10/11 recheck 0.39 on 10/18. 11/8 creatinine up to 1.4, 11/22 creatinine down to 1.25   - He has a history of renal cancer and resection. Has a single kidney.  - 11/1/2022 seen by nephrology: multifactorial, focus on BP control, Started Chlorthalidone 12.5 mg daily and reduce amlodipine to 2.5 mg per day, Low salt diet and reduce ice tea. Next step: may increase chlorthalidone if Cr stable but if not improving in swelling, may start torsemide Follow-up in 2 month   -Hypomagnesemia,11/8 decrease magnesium to 2 tablets 2 times a day-seems to have triggered more muscle cramps specifically in his right arm cussed with the patient uptitrating that back up for symptom control of his cramps, he continues on vitamin E for muscle cramps as well.     4.    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   #Skin GVHD- history of biopsy proven GVHD of the skin 8/30/2021; resolved.   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  WNL today 11/22  # Ocular GVHD: follows with ophthalmology. Maxitrol to lids, continue refreshing drops QID. Score 3  Followed closely by Dr. Juarez and had ocular fittings 11/8 however no lens seen on exam today he will follow-up with her team for refitting   # Oral GVHD: dex s/s three-4 times a day; tac ointment to lips as needed.  Clotrimazole cream added after seeing dermatology.  Lichenoid changes have resolved with no other ulcers since 11/8     Previous therapies  Tacrolimus-previously decided to stay on same dose, no checks of levels if clinically stable, and no need switch to sirolimus. (keep tac low as gvhd is quiet and viremia). 5/10 level 45 with starting of COVID therapy and D-D intractions (Paxlovid for COVID19, which has a drug interaction with tacrolimus-Ritonavir increases serum levels of tacrolimus).   Tacrolimus level subtherapeutic, increase to 2.5 mg twice daily recently, 8/23/2022 instructed to change tacrolimus to 2 mg twice daily. 9/6 with mild NEGRA, hyperkalemia, hypomagnesemia, and reports some tremors and cramps, suspect tacrolimus to be high therefore empirically decreased to 1.5 twice daily, skip morning dose of tacrolimus and took 2 mg today after his afternoon labs. Decrease to 1mg BID on Saturday.  Sirolimus  9/21 despite fluids and a dose adjustment of tacrolimus patient seem to have persistence NORBERTO related NEGRA, therefore after discussion with the patient decision was made to transition to sirolimus that was started on 9/21, patient initially seem to tolerate this well with normalization of kidney function  10/11 presented with flares of ocular and oral GVHD, fluid retention and gain of 5-6 kg, lower extremity edema, abdominal distention, total protein to creatinine ratio elevated at 0.4, in addition to elevation in BUN and creatinine, HTN.  Picture most consistent with sirolimus related side effects.  Less likely that the patient will tolerate even a lower level of sirolimus.   Therefore the patient was instructed to stop sirolimus, last dose on 10/10. 10/14 level 3.5 appropriately trending down.     Corticosteroids  3/1-3/16: prednisone 100mg every day  3/17 75mg every day   3/31 75 alt with 50  4/6: 75 alt with 25mg every other day  4/12 pred tapered to 60 alternating with 25mg   4/15 In setting of viruses trying to reactivate,GVHD stable: Taper 60/15mg alternating.  4/20 decrease pred further to 50/10.    4/25 taper pred to 50/0 every other day as long as symptoms stable.   5/2 Pred decrease 40/0 alternating every other day.   5/13 decrease to 30/0  5/20 decrease to 20/0 then slowly by 5 mg weekly  6/7 decrease to 10/0  Went up to 15/0 with mild increased LFTs  8/23/2022 increased to 20/0, with persistence of oral ulcers and active ocular GVHD.  Discussed with the patient the importance of stopping chewing of nicotine gums for prolonged hours as that would not help with the healing of the oral ulcers.  I discussed with the patient alternatives of nicotine patches that he will reconsider and let me know.  9/6 decreased to 15 alternating with 0  9/13 decreased to 10 alternating with 0  9/21 discontinued tacrolimus given persistent NEGRA and single kidney and start the patient on sirolimus without loading dose.  We will get a list of possible side effects and adverse events from the Pharm.D. see sirolimus section above.  10/11 GVHD flare. Sirolimus stopped. Resume prednisone 40 mg daily as of 10/12, no change with mildly worsening eye pain 10/14  Reduce pred to 35mg 10/25 and 25mg 11/1 11/8 20 mg   11/22 15 mg  12/13/22 10 mg (plan to taper to 5mg then slow taper 1 mg per month given long pred history)    Belumosudil  Patient intolerant/with disease flares on tacrolimus and sirolimus see above.  Discussed with the patient the different options for therapy of chronic GVHD that are FDA approved.  Given the side effect profiles after discussing in detail and given the least nephrotoxicity and  expected response, taking into account other metabolic effect as well.  We will proceed with prior authorization with belumosudil.  Patient was given material to review for possible expected adverse events and side effects with both Belumosodil and ruxolitinib.   --- Started on 10/18 in p.m. - feeling tired with malaise since starting and worried the drug is responsible     5.  ID: Afebrile with no current signs or symptoms of infection.  He recently was admitted following influenza and is now improved    # cat bite/scratch: doxy 100mg BID x 10 days; completes 11/3/22.  Bite has healed, no signs of infection.     #History of COVID   Positive via homed test 5/8. Treated with Paxlovid with resolution of symptoms.  Had recurrence on 5/18. Resolved  Covid 11/21, 12/21, received his influenza annual vaccine as well as COVID boosters locally 11/16     #History of pulmonary infiltrates:   4/20 CT chest with new RUL infiltrate. Highly immunocompromised. 4/22 Fungitell/asp GM were neg. BAL 4/29 NGTD, increased micafungin to 150mg IV daily-- f/u 6/1 Dr Garcia CT with mixed results, followed with Dr. Garcia August 2022 with resolution of pulmonary nodules and normalization of LFTs we will switch patient will switch patient to posaconazole prophylactic dose and monitor LFTs and any infectious concerns closely (wnl stable 10/14)     #History of CMV viremia:    - CMV viremia up to 1100. 4/15 started valcyte 900mg PO BID. 4/20 CMV <137, 5/10 ND, decreased to 450mg BID 4/30 - continue 4 weeks as long as CMV <137 till 5/28 + weekly CMV  - Switch to acyclovir after completion of current therapy 5/28. 10/11 CMV ND. Check monthly on IST, 11/8 CMV <137, 11/22 ND, recheck 12/8     - PPx:   ACV  Posaconazole (previously on micafungin with LFts abl, hx of pulmonary nodules needign treatment dose).   Pentamidine, last 11/22  Azithro 250mg daily (stopped levaquin due to possible tendonitis).      - EBV viremia: 4/20 CAP CT (w/ contrast): No  adenopathy. S/p 4/22 and 5/4/22 rituximab 375mg/m2 5/10 ND x2, 6/7 ND, 8/18/2022 971, 10/11 ND, check every other month on IST, 11/8 ND  S/p covid vaccine series 12/2021  S/p anuradha   Deferred annual vaccines in the setting of GVHD and immunosuppression therapy     6. Endo:   - Hx of graves disease; On synthroid 175 mcg daily. TSH normal 4/6/2022 no reflex to T4.  Recheck on 10/18 T3 low at 0.03, free T3 and T4 within normal.  Subclinical hyperthyroidism, 11/22/222 and discussion with endocrine, reduce the levothyroxine to 150 mcg/day and repeat labs in 2-3.  He has follow-up appointment with endocrine    - HLD: 11/2022 cholesterol 355, triglycerides 153, -- 11/8 started rosuvastatin 5 mg daily we will recheck lipid profile in 3 months, 11/22 CPK within normal     7. CV:   - Hypertension history - now on low side after weight loss  - TTE most recently 10/2022     8. GI:   -Omeprazole for heartburn  -LFTs as above, now normal. 9/6 decreased ursodiol to daily      9. Psych:   - Situational anxiety - lexapro 10mg daily.   - Insomnia: worse on steroids. Ativan, trazadone, melatonin     10. MSK: left food drop, PT. Occasional muscle cramps discussed optimizing vitamin D levels and considering vitamin D, some of the symptomatology of muscle cramps are likely related to chronic GVHD.     11. HCM/Age appropriate cancer screening:  -Discussed with the patient importance of age-appropriate cancer screening including colonoscopies, he is due for this.  -Discussed importance of at least once a year vitamin D level check, 8/18/2022 wnl  -Screening for secondary iron overload, see heme section above, start vitamins on 12/13  -Yearly TSH 2022 wnl; yearly lipid panel - see GI section above  -9/6/2022 DEXA scan Based on BMD diagnosis is consistent with normal bone density based on WHO criteria Ref. 1   -PFTs 9/20/22, see above  -Metabolic other, 8/2022 testosterone within normal  -11/22/2022 discussed with the patient the  challenges and the stresses of chronic illness and healthcare fatigue.  Patient had previously been on Lexapro that we restarted today confirmed with pharmacy that there are no drug drug interactions.  Additionally we will refer patient to PMNR to help with physical rehab and strength to help with fatigue.  Our social workers will also reach out to Nik and his wife for further resources including support groups for patients with chronic illness and fatigue post transplant.       Plan    -Decrease prednisone from 15 mg to 10 mg  - stop chlorthalidone - he will monitor his weight and BP  - he will avoid pushing free water and diversify his fluids  -He has monthly pentamadine next appointment scheduled  -Continue rosuvastatin 5 mg daily   -Follow-up with nephrology as well as Follow up with Dr. Juarez for eyes  - RTC two weeks with GIRISH to follow up on Na, BP and pred change, and 4 weeks with MD      I spent 45 minutes in the care of this patient today, which included time necessary for preparation for the visit, obtaining history, ordering medications/tests/procedures as medically indicated, review of pertinent medical literature, counseling of the patient, communication of recommendations to the care team, and documentation time.    DOMINIQUE BRITTON MD  December 13, 2022

## 2022-12-13 NOTE — NURSING NOTE
"Oncology Rooming Note    December 13, 2022 2:38 PM   Nik Nava is a 62 year old male who presents for:    Chief Complaint   Patient presents with     Port Draw     Labs drawn via port by RN. Vitals taken.     Oncology Clinic Visit     ALL in remission     Initial Vitals: /78 (BP Location: Right arm)   Pulse 77   Temp 98.1  F (36.7  C) (Oral)   Resp 16   Wt 111.1 kg (245 lb)   SpO2 97%   BMI 32.32 kg/m   Estimated body mass index is 32.32 kg/m  as calculated from the following:    Height as of 12/1/22: 1.854 m (6' 1\").    Weight as of this encounter: 111.1 kg (245 lb). Body surface area is 2.39 meters squared.  No Pain (0) Comment: Data Unavailable   No LMP for male patient.  Allergies reviewed: Yes  Medications reviewed: Yes    Medications: MEDICATION REFILLS NEEDED TODAY. Provider was notified.     Pharmacy name entered into [employer]:    [facility] - [place], [place] - [id number] [facility] [place], [place] - [address] [facility] [id number]  [facility]    Clinical concerns: none       Yamilet Marley CMA            "

## 2022-12-14 LAB — CMV DNA SPEC NAA+PROBE-ACNC: NOT DETECTED IU/ML

## 2022-12-15 ENCOUNTER — PATIENT OUTREACH (OUTPATIENT)
Dept: ONCOLOGY | Facility: CLINIC | Age: 62
End: 2022-12-15

## 2022-12-15 LAB
1,3 BETA GLUCAN SER-MCNC: <31 PG/ML
OBSERVATION IMP: NEGATIVE

## 2022-12-15 NOTE — PROGRESS NOTES
[facility]: Cancer Care                                                                                          Attempted to reach patient to change appt time today from 3:30 to 1-2pm, LVM with scheduling line to return call    Signature:  Emiliana Lan RN

## 2022-12-19 NOTE — PROGRESS NOTES
"For scheduled Bone marrow transplant I actually Nik is a 62 year old who is being evaluated via a billable video visit.      How would you like to obtain your AVS? MyChart  If the video visit is dropped, the invitation should be resent by: Send to e-mail at: [email or web address]  Will anyone else be joining your video visit? No     Kathleen MALLOY          Video-Visit Details    Video Start Time: 9:06 AM    Type of service:  Video Visit    Video End Time:10:00 AM    Originating Location (pt. 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     Oncologic history:   Per Dr. Chris Cote BMT note on 2022  \"1.  Acute lymphoblastic leukemia, [place] chromosome positive in CR, MRD neg. S/p allo sib PBSCT. (ABO matched)  HCT-CI score: 3 (prior solid tumor)  Day +100 bone marrow biopsy is 100% donor with no morphologic or flow cytometric evidence of leukemia BCR abl is pending.  - Day +180 restaging BM bx- still in CR  100% donor;  22 BCR ABL major breakpoint undetectable.   - 1 year restaging 2022: Markedly hypocellular bone marrow [less than 10% cellular] with maturing trilineage hematopoiesis and no definite morphologic or immunophenotypic evidence for involvement by B lymphoblastic leukemia. BCRABL1 PCR not detected, bone marrow % donor. FISH NORMAL No evidence of BCR-ABL1 " "fusion  - Maintenance with TKI posttransplantation given his Ph positive ALL that have not been started previously presumed secondary to multiple active issues specifically infections and COVID.  Per Avila Tobar: will reconsider this patient will think about whether this is something he would like to consider he was previously on Dasatinib, we would start on a low dose and increase as tolerated.  This is on hold now pending active GVHD therapy, we discussed on this visit 10/18 in agreement with holding off.\"    Nik is joined by his wife Lamar.  This is a phone visit near their home and [facility]    Symptoms,  -Nik and his wife note that in the last few weeks he has had steady improvement since having been last hospitalized and sick with influenza A.  - Generalized weakness and fatigue: This usually waxes and wanes depending on where he is at within his treatment schedule.  He most recently week with significant fatigue and shortness of breath following a recent influenza A hospitalization.  This is what prompted a PM&R consult.  Feels they have some general muscle wasting as well.  He and his wife also think the immunosuppressive drug he is currently on is causing some fatigue as well.  This has started to improve however and has been walking on a treadmill at home twice a day, 12 minutes at a time, which is about a third of a mile has been relatively hard for him.  He would exercise outside however given his immunocompromise state they are concerned about picking up infection, particularly fungal, while outside.  This is the biggest concern and are hopeful to try to address this with therapy and home exercise programs.  They would like to improve his conditioning so that in the event he does get sick again he has better reserves and a home exercise program to rely on.  -Falls and orthostatic hypotension: Has had multiple falls in the last year.  The last was week or so ago and relates this to orthostatic " hypotension and positional (leaning backwards).  Since his last fall his blood pressure medications, amlodipine and chlorthalidone, have been discontinued.  He no longer has symptoms of orthostasis.  He has had leg compressions in the past.  His other falls are related to drowsiness from sleeping medications.  - Sleep: He has a history of insomnia.  He currently takes a lorazepam every night to help fall asleep.  In the past he has taken trazodone however he does not like this as it makes him feel foggy in the morning.  This is resulted in at least 1 fall.  He has also taken melatonin in the past but not for quite some time.  His biggest issue is staying asleep after going to the bathroom about 4 times a night.  Is also worse when he is on higher doses of prednisone.  He gets about 9 hours of total sleep and does not feel well rested when he wakes up.  Denies any snoring.  - Left foot drop: This first occurred around March 2022 while he was on high doses of steroids for his GVHD.  He went to 1 physical therapy session because soon after his first session he developed COVID.  He never required an assistive device and his foot drop slowly improved during the summer after he began walking outside more and along with a steroid taper.  This is completely resolved now.  -Nutrition: He reports a balanced diet.  His weight has been stable exception of recently lost weight during diuresis.  He does drink a protein shake in the morning.  -Vision:  Has previously been dramatically affected by GVHD. Currently doing well with no concerns today.       Therapies/HEP,  PT: Previoiusly for foot drop, was 1 session.  He is concerned about returning as he got COVID from his last visit and is very cognizant of developing infections given his immunocompromise state.      Functionally,  He is currently independent with functional mobility in all ADLs and IADLs.  However when he has developed infections or sudden worsening disease he has  relied on his wife, who is a nurse to help with activities such as lower body dressing.           Past Medical and Surgical History:     Past Medical History:   Diagnosis Date     ALL (acute lymphocytic leukemia) (H)      Cancer (H)     renal cell     CKD (chronic kidney disease)      H/O peripheral stem cell transplant (H)      Thyroid disease      Past Surgical History:   Procedure Laterality Date     BACK SURGERY  2017     BONE MARROW BIOPSY, BONE SPECIMEN, NEEDLE/TROCAR Left 9/2/2021    Procedure: BIOPSY, BONE MARROW;  Surgeon: Jailyn Ny APRN CNP;  Location: [facility] OR     BONE MARROW BIOPSY, BONE SPECIMEN, NEEDLE/TROCAR Left 11/15/2021    Procedure: BIOPSY, BONE MARROW;  Surgeon: Socrates De La Torre;  Location: [facility] OR     BONE MARROW BIOPSY, BONE SPECIMEN, NEEDLE/TROCAR Left 2/7/2022    Procedure: BIOPSY, BONE MARROW;  Surgeon: Renetta Wiggins PA-C;  Location: [facility] OR     BONE MARROW BIOPSY, BONE SPECIMEN, NEEDLE/TROCAR Left 8/18/2022    Procedure: BIOPSY, BONE MARROW;  Surgeon: Lorrie Yap PA-C;  Location: [facility] OR     BRONCHOSCOPY (RIGID OR FLEXIBLE), DIAGNOSTIC N/A 4/29/2022    Procedure: BRONCHOSCOPY, WITH BRONCHOALVEOLAR LAVAGE;  Surgeon: Murtaza Garcia MD;  Location: [facility] GI     IR CVC TUNNEL PLACEMENT > 5 YRS OF AGE  8/4/2021     IR CVC TUNNEL REMOVAL LEFT  9/2/2021     IR LIVER BIOPSY PERCUTANEOUS  2/21/2022     PERCUTANEOUS BIOPSY LIVER N/A 2/21/2022    Procedure: NEEDLE BIOPSY, LIVER, PERCUTANEOUS;  Surgeon: Trace Castellanos MD;  Location: [facility] [place] [place]            Social History:     Social History     Tobacco Use     Smoking status: Former     Packs/day: 2.00     Years: 30.00     Pack years: 60.00     Types: Cigarettes     Quit date: 5/4/2019     Years since quitting: 3.6     Smokeless tobacco: Never   Substance Use Topics     Alcohol use: Not Currently       Marital Status:  45 years to his wife Lamar.  She works as a care coordinator in the [employer] system.  They have 3  kids and 13 grandkids.  Living situation: Nik lives with his wife in [place].  Multilevel home with several stairs to enter.  It is 6 stairs to get up to the main level and 6 stairs down from the entrance further other level of her home.  All needs can be met on the upper level.  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  Is not sure if they will go back as they are about about to sell the company.         Functional history:     Nik Nava is independent with all aspects of life.    ADLs: Independent   Assistive devices: None  iADLs (medication management and finances): Independent  Hand dominance: Right  Driving: Unknown           Family History:     Family History   Problem Relation Age of Onset     Lung Cancer Mother      Polycythemia Father      Coronary Artery Disease Maternal Grandmother             Medications:     Current Outpatient Medications   Medication Sig Dispense Refill     acyclovir (ZOVIRAX) 800 MG tablet Take 1 tablet (800 mg) by mouth 2 times daily 60 tablet 1     amLODIPine (NORVASC) 2.5 MG tablet Take 1 tablet (2.5 mg) by mouth daily HOLD as of 12/6 30 tablet 1     azithromycin (ZITHROMAX) 250 MG tablet Take 1 tablet (250 mg) by mouth daily 30 tablet 3     Belumosudil Mesylate 200 MG TABS Take 200 mg by mouth daily 30 tablet 3     Carboxymethylcell-Glycerin PF (REFRESH RELIEVA PF) 0.5-1 % SOLN Apply 1 drop to eye 4 times daily Preservative free. Either PF multidose bottle or PF vials 30 each 11     carboxymethylcellulose PF (CARBOXYMETHYLCELLULOSE SODIUM) 0.5 % ophthalmic solution Place 1 drop into both eyes 4 times daily 70 each 11     chlorthalidone (HYGROTON) 25 MG tablet Take 0.5 tablets (12.5 mg) by mouth daily 15 tablet 6     clobetasol (TEMOVATE) 0.05 % external ointment Apply twice daily as needed for sore in the mouth 30 g 3     dexamethasone alcohol-free  (DECADRON) 0.1 MG/ML solution Swish and spit 20 mLs (2 mg) in mouth 4 times daily 1000 mL 3     erythromycin (ROMYCIN) 5 MG/GM ophthalmic ointment Place 0.5 inches into both eyes At Bedtime 3.5 g 4     escitalopram (LEXAPRO) 10 MG tablet Take 1 tablet (10 mg) by mouth daily 30 tablet 3     fluorometholone (FML LIQUIFILM) 0.1 % ophthalmic suspension Place 1 drop into both eyes 2 times daily For total of 21 days then stop 5 mL 4     levothyroxine (SYNTHROID/LEVOTHROID) 150 MCG tablet Take 1 tablet (150 mcg) by mouth daily 30 tablet 1     LORazepam (ATIVAN) 1 MG tablet Take 1 tablet (1 mg) by mouth nightly as needed for anxiety or sleep 30 tablet 3     magnesium oxide (MAG-OX) 400 MG tablet Take 1 tablet (400 mg) by mouth 2 times daily 120 tablet 1     nicotine polacrilex (NICORETTE) 4 MG gum Take 4 mg by mouth as needed for smoking cessation        omeprazole (PRILOSEC) 40 MG DR capsule Take 1 capsule (40 mg) by mouth daily 30 capsule 3     posaconazole (NOXAFIL) 100 MG EC tablet Take 3 tablets (300 mg) by mouth every morning 90 tablet 3     predniSONE (DELTASONE) 10 MG tablet Current dose is 15mg/day 30 tablet 1     predniSONE (DELTASONE) 5 MG tablet Current dose is 15mg/day 60 tablet 3     rosuvastatin (CRESTOR) 5 MG tablet Take 1 tablet (5 mg) by mouth daily 30 tablet 3     sennosides (SENOKOT) 8.6 MG tablet Take 1 tablet by mouth 3 times daily as needed for constipation 90 tablet 0     sodium chloride 0.9 % neb solution 3 mLs by Other route 2 times daily 300 mL 11     traZODone (DESYREL) 50 MG tablet Take 1 tablet (50 mg) by mouth At Bedtime 30 tablet 3            Allergies:     Allergies   Allergen Reactions     Sulfamethoxazole W/Trimethoprim Rash              ROS:     A focused ROS is negative other than the symptoms noted above in the HPI.      Constitutional: denies any fevers, chills, any recent weight loss   Eyes: denies changes in visual acuity; has had chronic vision changes related to GVHD  Ears, Nose,  "Throat: denies any difficulty swallowing   Cardiovascular: denies any exertional chest pain or palpitation  Respiratory: shortness of breath with activity is stable  Gastrointestinal: denies any nausea, vomiting, abdominal pain, diarrhea or constipation   Genitourinary: denies any dysuria, hematuria, frequency or urgency; Has LUTS overnight.  Musculoskeletal: denies any muscle pain, joint pain, neck pain or back pain   Neurologic: denies any headache, changes in motor or sensory function, no loss of balance or vertigo   Psychiatric: denies mood changes; sleep is fair to poor             Physical Examiniation:     VITAL SIGNS: There were no vitals taken for this visit.   BMI: Estimated body mass index is 32.32 kg/m  as calculated from the following:    Height as of 12/1/22: 1.854 m (6' 1\").    Weight as of 12/13/22: 111.1 kg (245 lb).  Limited given nature of video exam    Gen: NAD, pleasant and cooperative   HEENT:  normocepalic, without obvious abnormality  Pulm: non-labored breathing in room air  Neuro/MSK: Awake, alert, oriented to conversation, standing and moving all extremities with at least antigravity strength            Laboratory/Imaging:     Oncology Visit on 12/13/2022   Component Date Value Ref Range Status     (1,3)-Beta-D-Glucan 12/13/2022 <31  pg/mL Final     B-D GLUCAN INTERPRETATION (1,3) 12/13/2022 Negative  Negative Final    INTERPRETIVE INFORMATION: (1,3)-beta-D-glucan (Fungitell)      Less than 31 pg/mL ................... Negative    31-59 pg/mL .......................... Negative    60-79 pg/mL .......................... Indeterminate    Greater than or equal to 80 pg/mL .... Positive    The Fungitell test is indicated for presumptive diagnosis   of fungal infection and should be used in conjunction with   other diagnostic procedures. This test does not detect   certain fungal species such as Cryptococcus, which produce   very low levels of (1,3)-beta-D-glucan. This test will not   detect the " zygomycetes, such as Absidia, Mucor, and   Rhizopus, which are not known to produce   (1,3)-beta-D-glucan. In addition, the yeast phase of   Blastomyces dermatitidis produces little   (1,3)-beta-D-glucan and may not be detected by the assay.  Performed By: [facility]  [address] [place] [address]  : Juwan Bernard MD, PhD     CMV DNA IU/mL 12/13/2022 Not Detected  Not Detected IU/mL Final     Magnesium 12/13/2022 1.8  1.7 - 2.3 mg/dL Final     Sodium 12/13/2022 128 (L)  136 - 145 mmol/L Final     Potassium 12/13/2022 5.3  3.4 - 5.3 mmol/L Final     Chloride 12/13/2022 93 (L)  98 - 107 mmol/L Final     Carbon Dioxide (CO2) 12/13/2022 25  22 - 29 mmol/L Final     Anion Gap 12/13/2022 10  7 - 15 mmol/L Final     Urea Nitrogen 12/13/2022 37.6 (H)  8.0 - 23.0 mg/dL Final     Creatinine 12/13/2022 1.28 (H)  0.67 - 1.17 mg/dL Final     Calcium 12/13/2022 10.2  8.8 - 10.2 mg/dL Final     Glucose 12/13/2022 95  70 - 99 mg/dL Final     Alkaline Phosphatase 12/13/2022 116  40 - 129 U/L Final     AST 12/13/2022 33  10 - 50 U/L Final     ALT 12/13/2022 40  10 - 50 U/L Final     Protein Total 12/13/2022 6.4  6.4 - 8.3 g/dL Final     Albumin 12/13/2022 4.1  3.5 - 5.2 g/dL Final     Bilirubin Total 12/13/2022 1.1  <=1.2 mg/dL Final     GFR Estimate 12/13/2022 62  >60 mL/min/1.73m2 Final    Effective December 21, 2021 eGFRcr in adults is calculated using the 2021 CKD-EPI creatinine equation which includes age and gender (Omar et al., NEJM, DOI: 10.1056/XZTXzh8171888)     WBC Count 12/13/2022 8.3  4.0 - 11.0 10e3/uL Final     RBC Count 12/13/2022 3.11 (L)  4.40 - 5.90 10e6/uL Final     Hemoglobin 12/13/2022 11.0 (L)  13.3 - 17.7 g/dL Final     Hematocrit 12/13/2022 30.6 (L)  40.0 - 53.0 % Final     MCV 12/13/2022 98  78 - 100 fL Final     MCH 12/13/2022 35.4 (H)  26.5 - 33.0 pg Final     MCHC 12/13/2022 35.9  31.5 - 36.5 g/dL Final     RDW 12/13/2022 15.0  10.0 - 15.0 % Final     Platelet  Count 12/13/2022 102 (L)  150 - 450 10e3/uL Final     % Neutrophils 12/13/2022 63  % Final     % Lymphocytes 12/13/2022 28  % Final     % Monocytes 12/13/2022 8  % Final     % Eosinophils 12/13/2022 0  % Final     % Basophils 12/13/2022 0  % Final     % Immature Granulocytes 12/13/2022 1  % Final     NRBCs per 100 WBC 12/13/2022 0  <1 /100 Final     Absolute Neutrophils 12/13/2022 5.2  1.6 - 8.3 10e3/uL Final     Absolute Lymphocytes 12/13/2022 2.3  0.8 - 5.3 10e3/uL Final     Absolute Monocytes 12/13/2022 0.7  0.0 - 1.3 10e3/uL Final     Absolute Eosinophils 12/13/2022 0.0  0.0 - 0.7 10e3/uL Final     Absolute Basophils 12/13/2022 0.0  0.0 - 0.2 10e3/uL Final     Absolute Immature Granulocytes 12/13/2022 0.1  <=0.4 10e3/uL Final     Absolute NRBCs 12/13/2022 0.0  10e3/uL Final              Assessment/Plan:     Nik Nava is a 62 year old male with cancer history of 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     Overall, Nik and his wife report that in the last few weeks he is actually made some solid improvement in his general conditioning as he is still slowly recovering from recent influenza during a brief hospitalization.  Their main concern today is developing a home exercise program that he can do inside as there is concerns given his immunosuppression of being outside for too long.  They are hoping to improve his physical condition so he has better reserves for when he becomes sick or has disease recurrence.  We discussed that we will try to get virtual physical therapy ordered for them to accommodate the concerns about sandrine infections in the public.  Given his foot drop is resolved no need for bracing therapy for this.  As for his  sleep we advised a trial melatonin 5 mg each evening and taking that 2 hours prior to desired bedtime.  We discussed he can continue using the lorazepam as needed.  Since trazodone has an drowsy to the point where he is fallen we advised against using this at this time.    1. Patient education: In depth discussion and education was provided about the assessment and implications of each of the below recommendations for management. Patient indicated readiness to learn, all questions were answered and understanding of material presented was confirmed.  2. Work-up: None  3. Therapy/equipment/braces:  1. We will try to get virtual physical therapy ordered for him.  If this does not work we can discuss with the patient whether they would prefer home PT or come into the clinic.  4. Medications:  1. Continue lorazepam as needed.  2. Trial melatonin 5 mg each evening. Advised 2 hours prior to bed time.   5. Interventions: None  6. Referral / follow up with other providers: None  7. Follow up: 3-month virtual follow-up    Patient was seen and discussed with attending physician, Dr. Mcdonough.     Simón Valadez DO  PGY3 PM&R  [facility]  Pager: [phone]    Physician Attestation   I, Carmen Mcdonough MD, saw this patient and agree with the findings and plan of care as documented in the note.      Items personally reviewed/procedural attestation: vitals, labs and imaging and agree with the interpretation documented in the note.    Carmen Mcdonough MD     I appreciate the opportunity to participate in the care of your patient.     90 minutes spent on the date of the encounter doing chart review, history and exam, documentation and further activities as noted above.

## 2022-12-20 ENCOUNTER — VIRTUAL VISIT (OUTPATIENT)
Dept: ONCOLOGY | Facility: CLINIC | Age: 62
End: 2022-12-20
Attending: INTERNAL MEDICINE
Payer: COMMERCIAL

## 2022-12-20 DIAGNOSIS — C91.01 ACUTE LYMPHOBLASTIC LEUKEMIA (ALL) IN REMISSION (H): ICD-10-CM

## 2022-12-20 DIAGNOSIS — Z94.81 STATUS POST BONE MARROW TRANSPLANT (H): ICD-10-CM

## 2022-12-20 DIAGNOSIS — D89.813 GVHD AS COMPLICATION OF BONE MARROW TRANSPLANT (H): ICD-10-CM

## 2022-12-20 DIAGNOSIS — G47.00 INSOMNIA, UNSPECIFIED TYPE: ICD-10-CM

## 2022-12-20 DIAGNOSIS — R53.82 CHRONIC FATIGUE: Primary | ICD-10-CM

## 2022-12-20 DIAGNOSIS — T86.09 GVHD AS COMPLICATION OF BONE MARROW TRANSPLANT (H): ICD-10-CM

## 2022-12-20 PROCEDURE — 99417 PROLNG OP E/M EACH 15 MIN: CPT | Mod: 95 | Performed by: STUDENT IN AN ORGANIZED HEALTH CARE EDUCATION/TRAINING PROGRAM

## 2022-12-20 PROCEDURE — G0463 HOSPITAL OUTPT CLINIC VISIT: HCPCS | Mod: PN,GT | Performed by: STUDENT IN AN ORGANIZED HEALTH CARE EDUCATION/TRAINING PROGRAM

## 2022-12-20 PROCEDURE — 99205 OFFICE O/P NEW HI 60 MIN: CPT | Mod: 95 | Performed by: STUDENT IN AN ORGANIZED HEALTH CARE EDUCATION/TRAINING PROGRAM

## 2022-12-20 NOTE — LETTER
"    2022         RE: Nik Nava  [id number] [facility] [id number]        Dear Colleague,    Thank you for referring your patient, Nik Nava, to the [facility] CANC[facility]. Please see a copy of my visit note below.    For scheduled Bone marrow transplant I actually Nik is a 62 year old who is being evaluated via a billable video visit.      How would you like to obtain your AVS? MyChart  If the video visit is dropped, the invitation should be resent by: Send to e-mail at: [email or web address]  Will anyone else be joining your video visit? Lisa MALLOY          Video-Visit Details    Video Start Time: 9:06 AM    Type of service:  Video Visit    Video End Time:10:00 AM    Originating Location (pt. Location): Home        Distant Location (provider location):  On-site    Platform used for Video Visit: 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     Oncologic history:   Per Dr. Chris Cote BMT note on 2022  \"1.  Acute lymphoblastic leukemia, [place] chromosome positive in CR, MRD neg. S/p allo sib PBSCT. (ABO matched)  HCT-CI score: 3 (prior solid tumor)  Day +100 bone marrow biopsy is 100% donor with no morphologic or flow cytometric evidence of leukemia BCR abl is pending.  - Day +180 restaging BM bx- still in CR  100% donor;  22 BCR ABL major breakpoint undetectable.   - 1 year restaging 2022: Markedly hypocellular bone marrow " "[less than 10% cellular] with maturing trilineage hematopoiesis and no definite morphologic or immunophenotypic evidence for involvement by B lymphoblastic leukemia. BCRABL1 PCR not detected, bone marrow % donor. FISH NORMAL No evidence of BCR-ABL1 fusion  - Maintenance with TKI posttransplantation given his Ph positive ALL that have not been started previously presumed secondary to multiple active issues specifically infections and COVID.  Per Avila Tobar: will reconsider this patient will think about whether this is something he would like to consider he was previously on Dasatinib, we would start on a low dose and increase as tolerated.  This is on hold now pending active GVHD therapy, we discussed on this visit 10/18 in agreement with holding off.\"    Nik is joined by his wife Lamar.  This is a phone visit near their home and [facility]    Symptoms,  -Nik and his wife note that in the last few weeks he has had steady improvement since having been last hospitalized and sick with influenza A.  - Generalized weakness and fatigue: This usually waxes and wanes depending on where he is at within his treatment schedule.  He most recently week with significant fatigue and shortness of breath following a recent influenza A hospitalization.  This is what prompted a PM&R consult.  Feels they have some general muscle wasting as well.  He and his wife also think the immunosuppressive drug he is currently on is causing some fatigue as well.  This has started to improve however and has been walking on a treadmill at home twice a day, 12 minutes at a time, which is about a third of a mile has been relatively hard for him.  He would exercise outside however given his immunocompromise state they are concerned about picking up infection, particularly fungal, while outside.  This is the biggest concern and are hopeful to try to address this with therapy and home exercise programs.  They would like to improve his " conditioning so that in the event he does get sick again he has better reserves and a home exercise program to rely on.  -Falls and orthostatic hypotension: Has had multiple falls in the last year.  The last was week or so ago and relates this to orthostatic hypotension and positional (leaning backwards).  Since his last fall his blood pressure medications, amlodipine and chlorthalidone, have been discontinued.  He no longer has symptoms of orthostasis.  He has had leg compressions in the past.  His other falls are related to drowsiness from sleeping medications.  - Sleep: He has a history of insomnia.  He currently takes a lorazepam every night to help fall asleep.  In the past he has taken trazodone however he does not like this as it makes him feel foggy in the morning.  This is resulted in at least 1 fall.  He has also taken melatonin in the past but not for quite some time.  His biggest issue is staying asleep after going to the bathroom about 4 times a night.  Is also worse when he is on higher doses of prednisone.  He gets about 9 hours of total sleep and does not feel well rested when he wakes up.  Denies any snoring.  - Left foot drop: This first occurred around March 2022 while he was on high doses of steroids for his GVHD.  He went to 1 physical therapy session because soon after his first session he developed COVID.  He never required an assistive device and his foot drop slowly improved during the summer after he began walking outside more and along with a steroid taper.  This is completely resolved now.  -Nutrition: He reports a balanced diet.  His weight has been stable exception of recently lost weight during diuresis.  He does drink a protein shake in the morning.  -Vision:  Has previously been dramatically affected by GVHD. Currently doing well with no concerns today.       Therapies/HEP,  PT: Previoiusly for foot drop, was 1 session.  He is concerned about returning as he got COVID from his last  visit and is very cognizant of developing infections given his immunocompromise state.      Functionally,  He is currently independent with functional mobility in all ADLs and IADLs.  However when he has developed infections or sudden worsening disease he has relied on his wife, who is a nurse to help with activities such as lower body dressing.           Past Medical and Surgical History:     Past Medical History:   Diagnosis Date     ALL (acute lymphocytic leukemia) (H)      Cancer (H)     renal cell     CKD (chronic kidney disease)      H/O peripheral stem cell transplant (H)      Thyroid disease      Past Surgical History:   Procedure Laterality Date     BACK SURGERY  2017     BONE MARROW BIOPSY, BONE SPECIMEN, NEEDLE/TROCAR Left 9/2/2021    Procedure: BIOPSY, BONE MARROW;  Surgeon: Jailyn Ny APRN CNP;  Location: [facility] OR     BONE MARROW BIOPSY, BONE SPECIMEN, NEEDLE/TROCAR Left 11/15/2021    Procedure: BIOPSY, BONE MARROW;  Surgeon: Socrates De La Torre;  Location: [facility] OR     BONE MARROW BIOPSY, BONE SPECIMEN, NEEDLE/TROCAR Left 2/7/2022    Procedure: BIOPSY, BONE MARROW;  Surgeon: Renetta Wiggins PA-C;  Location: [facility] OR     BONE MARROW BIOPSY, BONE SPECIMEN, NEEDLE/TROCAR Left 8/18/2022    Procedure: BIOPSY, BONE MARROW;  Surgeon: Lorrie Yap PA-C;  Location: [facility] OR     BRONCHOSCOPY (RIGID OR FLEXIBLE), DIAGNOSTIC N/A 4/29/2022    Procedure: BRONCHOSCOPY, WITH BRONCHOALVEOLAR LAVAGE;  Surgeon: Murtaza Garcia MD;  Location: [facility] GI     IR CVC TUNNEL PLACEMENT > 5 YRS OF AGE  8/4/2021     IR CVC TUNNEL REMOVAL LEFT  9/2/2021     IR LIVER BIOPSY PERCUTANEOUS  2/21/2022     PERCUTANEOUS BIOPSY LIVER N/A 2/21/2022    Procedure: NEEDLE BIOPSY, LIVER, PERCUTANEOUS;  Surgeon: Trace Castellanos MD;  Location[place] [facility]            Social History:     Social History     Tobacco Use     Smoking status: Former     Packs/day: 2.00     Years: 30.00     Pack years: 60.00     Types: Cigarettes      Quit date: 5/4/2019     Years since quitting: 3.6     Smokeless tobacco: Never   Substance Use Topics     Alcohol use: Not Currently       Marital Status:  45 years to his wife Lamar.  She works as a care coordinator in the [employer].  They have 3 kids and 13 grandkids.  Living situation: Nik lives with his wife in [place].  Multilevel home with several stairs to enter.  It is 6 stairs to get up to the main level and 6 stairs down from the entrance further other level of her home.  All needs can be met on the upper level.  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  Is not sure if they will go back as they are about about to sell the company.         Functional history:     Nik Nava is independent with all aspects of life.    ADLs: Independent   Assistive devices: None  iADLs (medication management and finances): Independent  Hand dominance: Right  Driving: Unknown           Family History:     Family History   Problem Relation Age of Onset     Lung Cancer Mother      Polycythemia Father      Coronary Artery Disease Maternal Grandmother             Medications:     Current Outpatient Medications   Medication Sig Dispense Refill     acyclovir (ZOVIRAX) 800 MG tablet Take 1 tablet (800 mg) by mouth 2 times daily 60 tablet 1     amLODIPine (NORVASC) 2.5 MG tablet Take 1 tablet (2.5 mg) by mouth daily HOLD as of 12/6 30 tablet 1     azithromycin (ZITHROMAX) 250 MG tablet Take 1 tablet (250 mg) by mouth daily 30 tablet 3     Belumosudil Mesylate 200 MG TABS Take 200 mg by mouth daily 30 tablet 3     Carboxymethylcell-Glycerin PF (REFRESH RELIEVA PF) 0.5-1 % SOLN Apply 1 drop to eye 4 times daily Preservative free. Either PF multidose bottle or PF vials 30 each 11     carboxymethylcellulose PF (CARBOXYMETHYLCELLULOSE SODIUM) 0.5 % ophthalmic solution Place 1 drop into both eyes 4 times  daily 70 each 11     chlorthalidone (HYGROTON) 25 MG tablet Take 0.5 tablets (12.5 mg) by mouth daily 15 tablet 6     clobetasol (TEMOVATE) 0.05 % external ointment Apply twice daily as needed for sore in the mouth 30 g 3     dexamethasone alcohol-free (DECADRON) 0.1 MG/ML solution Swish and spit 20 mLs (2 mg) in mouth 4 times daily 1000 mL 3     erythromycin (ROMYCIN) 5 MG/GM ophthalmic ointment Place 0.5 inches into both eyes At Bedtime 3.5 g 4     escitalopram (LEXAPRO) 10 MG tablet Take 1 tablet (10 mg) by mouth daily 30 tablet 3     fluorometholone (FML LIQUIFILM) 0.1 % ophthalmic suspension Place 1 drop into both eyes 2 times daily For total of 21 days then stop 5 mL 4     levothyroxine (SYNTHROID/LEVOTHROID) 150 MCG tablet Take 1 tablet (150 mcg) by mouth daily 30 tablet 1     LORazepam (ATIVAN) 1 MG tablet Take 1 tablet (1 mg) by mouth nightly as needed for anxiety or sleep 30 tablet 3     magnesium oxide (MAG-OX) 400 MG tablet Take 1 tablet (400 mg) by mouth 2 times daily 120 tablet 1     nicotine polacrilex (NICORETTE) 4 MG gum Take 4 mg by mouth as needed for smoking cessation        omeprazole (PRILOSEC) 40 MG DR capsule Take 1 capsule (40 mg) by mouth daily 30 capsule 3     posaconazole (NOXAFIL) 100 MG EC tablet Take 3 tablets (300 mg) by mouth every morning 90 tablet 3     predniSONE (DELTASONE) 10 MG tablet Current dose is 15mg/day 30 tablet 1     predniSONE (DELTASONE) 5 MG tablet Current dose is 15mg/day 60 tablet 3     rosuvastatin (CRESTOR) 5 MG tablet Take 1 tablet (5 mg) by mouth daily 30 tablet 3     sennosides (SENOKOT) 8.6 MG tablet Take 1 tablet by mouth 3 times daily as needed for constipation 90 tablet 0     sodium chloride 0.9 % neb solution 3 mLs by Other route 2 times daily 300 mL 11     traZODone (DESYREL) 50 MG tablet Take 1 tablet (50 mg) by mouth At Bedtime 30 tablet 3            Allergies:     Allergies   Allergen Reactions     Sulfamethoxazole W/Trimethoprim Rash               "ROS:     A focused ROS is negative other than the symptoms noted above in the HPI.      Constitutional: denies any fevers, chills, any recent weight loss   Eyes: denies changes in visual acuity; has had chronic vision changes related to GVHD  Ears, Nose, Throat: denies any difficulty swallowing   Cardiovascular: denies any exertional chest pain or palpitation  Respiratory: shortness of breath with activity is stable  Gastrointestinal: denies any nausea, vomiting, abdominal pain, diarrhea or constipation   Genitourinary: denies any dysuria, hematuria, frequency or urgency; Has LUTS overnight.  Musculoskeletal: denies any muscle pain, joint pain, neck pain or back pain   Neurologic: denies any headache, changes in motor or sensory function, no loss of balance or vertigo   Psychiatric: denies mood changes; sleep is fair to poor             Physical Examiniation:     VITAL SIGNS: There were no vitals taken for this visit.   BMI: Estimated body mass index is 32.32 kg/m  as calculated from the following:    Height as of 12/1/22: 1.854 m (6' 1\").    Weight as of 12/13/22: 111.1 kg (245 lb).  Limited given nature of video exam    Gen: NAD, pleasant and cooperative   HEENT:  normocepalic, without obvious abnormality  Pulm: non-labored breathing in room air  Neuro/MSK: Awake, alert, oriented to conversation, standing and moving all extremities with at least antigravity strength            Laboratory/Imaging:     Oncology Visit on 12/13/2022   Component Date Value Ref Range Status     (1,3)-Beta-D-Glucan 12/13/2022 <31  pg/mL Final     B-D GLUCAN INTERPRETATION (1,3) 12/13/2022 Negative  Negative Final    INTERPRETIVE INFORMATION: (1,3)-beta-D-glucan (Fungitell)      Less than 31 pg/mL ................... Negative    31-59 pg/mL .......................... Negative    60-79 pg/mL .......................... Indeterminate    Greater than or equal to 80 pg/mL .... Positive    The Fungitell test is indicated for presumptive " diagnosis   of fungal infection and should be used in conjunction with   other diagnostic procedures. This test does not detect   certain fungal species such as Cryptococcus, which produce   very low levels of (1,3)-beta-D-glucan. This test will not   detect the zygomycetes, such as Absidia, Mucor, and   Rhizopus, which are not known to produce   (1,3)-beta-D-glucan. In addition, the yeast phase of   Blastomyces dermatitidis produces little   (1,3)-beta-D-glucan and may not be detected by the assay.  Performed By: [facility]  [address] [place] [id number]  : Juwan Bernard MD, PhD     CMV DNA IU/mL 12/13/2022 Not Detected  Not Detected IU/mL Final     Magnesium 12/13/2022 1.8  1.7 - 2.3 mg/dL Final     Sodium 12/13/2022 128 (L)  136 - 145 mmol/L Final     Potassium 12/13/2022 5.3  3.4 - 5.3 mmol/L Final     Chloride 12/13/2022 93 (L)  98 - 107 mmol/L Final     Carbon Dioxide (CO2) 12/13/2022 25  22 - 29 mmol/L Final     Anion Gap 12/13/2022 10  7 - 15 mmol/L Final     Urea Nitrogen 12/13/2022 37.6 (H)  8.0 - 23.0 mg/dL Final     Creatinine 12/13/2022 1.28 (H)  0.67 - 1.17 mg/dL Final     Calcium 12/13/2022 10.2  8.8 - 10.2 mg/dL Final     Glucose 12/13/2022 95  70 - 99 mg/dL Final     Alkaline Phosphatase 12/13/2022 116  40 - 129 U/L Final     AST 12/13/2022 33  10 - 50 U/L Final     ALT 12/13/2022 40  10 - 50 U/L Final     Protein Total 12/13/2022 6.4  6.4 - 8.3 g/dL Final     Albumin 12/13/2022 4.1  3.5 - 5.2 g/dL Final     Bilirubin Total 12/13/2022 1.1  <=1.2 mg/dL Final     GFR Estimate 12/13/2022 62  >60 mL/min/1.73m2 Final    Effective December 21, 2021 eGFRcr in adults is calculated using the 2021 CKD-EPI creatinine equation which includes age and gender (Omar et al., NEJ, DOI: 10.1056/AWKCwb6200407)     WBC Count 12/13/2022 8.3  4.0 - 11.0 10e3/uL Final     RBC Count 12/13/2022 3.11 (L)  4.40 - 5.90 10e6/uL Final     Hemoglobin 12/13/2022 11.0 (L)  13.3 - 17.7  g/dL Final     Hematocrit 12/13/2022 30.6 (L)  40.0 - 53.0 % Final     MCV 12/13/2022 98  78 - 100 fL Final     MCH 12/13/2022 35.4 (H)  26.5 - 33.0 pg Final     MCHC 12/13/2022 35.9  31.5 - 36.5 g/dL Final     RDW 12/13/2022 15.0  10.0 - 15.0 % Final     Platelet Count 12/13/2022 102 (L)  150 - 450 10e3/uL Final     % Neutrophils 12/13/2022 63  % Final     % Lymphocytes 12/13/2022 28  % Final     % Monocytes 12/13/2022 8  % Final     % Eosinophils 12/13/2022 0  % Final     % Basophils 12/13/2022 0  % Final     % Immature Granulocytes 12/13/2022 1  % Final     NRBCs per 100 WBC 12/13/2022 0  <1 /100 Final     Absolute Neutrophils 12/13/2022 5.2  1.6 - 8.3 10e3/uL Final     Absolute Lymphocytes 12/13/2022 2.3  0.8 - 5.3 10e3/uL Final     Absolute Monocytes 12/13/2022 0.7  0.0 - 1.3 10e3/uL Final     Absolute Eosinophils 12/13/2022 0.0  0.0 - 0.7 10e3/uL Final     Absolute Basophils 12/13/2022 0.0  0.0 - 0.2 10e3/uL Final     Absolute Immature Granulocytes 12/13/2022 0.1  <=0.4 10e3/uL Final     Absolute NRBCs 12/13/2022 0.0  10e3/uL Final              Assessment/Plan:     Nik Nava is a 62 year old male with cancer history of 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     Overall, Nik and his wife report that in the last few weeks he is actually made some solid improvement in his general conditioning as he is still slowly recovering from recent influenza during a brief hospitalization.  Their main concern today is developing a home exercise program that he can do inside as there is concerns given his immunosuppression of being outside for too long.  They are hoping to improve his physical condition so he has better reserves for when he becomes  sick or has disease recurrence.  We discussed that we will try to get virtual physical therapy ordered for them to accommodate the concerns about sandrine infections in the public.  Given his foot drop is resolved no need for bracing therapy for this.  As for his sleep we advised a trial melatonin 5 mg each evening and taking that 2 hours prior to desired bedtime.  We discussed he can continue using the lorazepam as needed.  Since trazodone has an drowsy to the point where he is fallen we advised against using this at this time.    1. Patient education: In depth discussion and education was provided about the assessment and implications of each of the below recommendations for management. Patient indicated readiness to learn, all questions were answered and understanding of material presented was confirmed.  2. Work-up: None  3. Therapy/equipment/braces:  1. We will try to get virtual physical therapy ordered for him.  If this does not work we can discuss with the patient whether they would prefer home PT or come into the clinic.  4. Medications:  1. Continue lorazepam as needed.  2. Trial melatonin 5 mg each evening. Advised 2 hours prior to bed time.   5. Interventions: None  6. Referral / follow up with other providers: None  7. Follow up: 3-month virtual follow-up    Patient was seen and discussed with attending physician, Dr. Mcdonough.     Simón Rory DO  PGY3 PM&R  [facility]  Pager: [phone]    Physician Attestation   I, Carmen Mcdonough MD, saw this patient and agree with the findings and plan of care as documented in the note.      Items personally reviewed/procedural attestation: vitals, labs and imaging and agree with the interpretation documented in the note.    Carmen Mcdonough MD     I appreciate the opportunity to participate in the care of your patient.     90 minutes spent on the date of the encounter doing chart review, history and exam, documentation and further activities as  noted above.                Again, thank you for allowing me to participate in the care of your patient.        Sincerely,        Carmen Mcdonough MD

## 2022-12-20 NOTE — PATIENT INSTRUCTIONS
We will order physical therapy to help with generalized weakness and deconditioning to help develop a home exercise program. We will start with virtual sessions if available.   Trial melatonin 5 mg each evening two hours before you're desired bedtime.   Follow up in 3 months with a virtual visit

## 2022-12-20 NOTE — LETTER
"    2022         RE: Nik Nava  [address] [facility] [address]        Dear Colleague,    Thank you for referring your patient, Nik Nava, to the [facility]. Please see a copy of my visit note below.    For scheduled Bone marrow transplant I actually Nik is a 62 year old who is being evaluated via a billable video visit.      How would you like to obtain your AVS? MyChart  If the video visit is dropped, the invitation should be resent by: Send to e-mail at: [email or web address]  Will anyone else be joining your video visit? Lisa MALLOY          Video-Visit Details    Video Start Time: 9:06 AM    Type of service:  Video Visit    Video End Time:10:00 AM    Originating Location (pt. Location): Home        Distant Location (provider location):  On-site    Platform used for Video Visit: 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     Oncologic history:   Per Dr. Chris Cote BMT note on 2022  \"1.  Acute lymphoblastic leukemia, [place] chromosome positive in CR, MRD neg. S/p allo sib PBSCT. (ABO matched)  HCT-CI score: 3 (prior solid tumor)  Day +100 bone marrow biopsy is 100% donor with no morphologic or flow cytometric evidence of leukemia BCR abl is pending.  - Day +180 restaging BM bx- still in CR  100% donor;  22 BCR ABL major breakpoint undetectable.   - 1 year restaging 2022: Markedly hypocellular bone marrow " "[less than 10% cellular] with maturing trilineage hematopoiesis and no definite morphologic or immunophenotypic evidence for involvement by B lymphoblastic leukemia. BCRABL1 PCR not detected, bone marrow % donor. FISH NORMAL No evidence of BCR-ABL1 fusion  - Maintenance with TKI posttransplantation given his Ph positive ALL that have not been started previously presumed secondary to multiple active issues specifically infections and COVID.  Per Avila Tobar: will reconsider this patient will think about whether this is something he would like to consider he was previously on Dasatinib, we would start on a low dose and increase as tolerated.  This is on hold now pending active GVHD therapy, we discussed on this visit 10/18 in agreement with holding off.\"    Nik is joined by his wife Lamar.  This is a phone visit near their home and [facility]    Symptoms,  -Nik and his wife note that in the last few weeks he has had steady improvement since having been last hospitalized and sick with influenza A.  - Generalized weakness and fatigue: This usually waxes and wanes depending on where he is at within his treatment schedule.  He most recently week with significant fatigue and shortness of breath following a recent influenza A hospitalization.  This is what prompted a PM&R consult.  Feels they have some general muscle wasting as well.  He and his wife also think the immunosuppressive drug he is currently on is causing some fatigue as well.  This has started to improve however and has been walking on a treadmill at home twice a day, 12 minutes at a time, which is about a third of a mile has been relatively hard for him.  He would exercise outside however given his immunocompromise state they are concerned about picking up infection, particularly fungal, while outside.  This is the biggest concern and are hopeful to try to address this with therapy and home exercise programs.  They would like to improve his " conditioning so that in the event he does get sick again he has better reserves and a home exercise program to rely on.  -Falls and orthostatic hypotension: Has had multiple falls in the last year.  The last was week or so ago and relates this to orthostatic hypotension and positional (leaning backwards).  Since his last fall his blood pressure medications, amlodipine and chlorthalidone, have been discontinued.  He no longer has symptoms of orthostasis.  He has had leg compressions in the past.  His other falls are related to drowsiness from sleeping medications.  - Sleep: He has a history of insomnia.  He currently takes a lorazepam every night to help fall asleep.  In the past he has taken trazodone however he does not like this as it makes him feel foggy in the morning.  This is resulted in at least 1 fall.  He has also taken melatonin in the past but not for quite some time.  His biggest issue is staying asleep after going to the bathroom about 4 times a night.  Is also worse when he is on higher doses of prednisone.  He gets about 9 hours of total sleep and does not feel well rested when he wakes up.  Denies any snoring.  - Left foot drop: This first occurred around March 2022 while he was on high doses of steroids for his GVHD.  He went to 1 physical therapy session because soon after his first session he developed COVID.  He never required an assistive device and his foot drop slowly improved during the summer after he began walking outside more and along with a steroid taper.  This is completely resolved now.  -Nutrition: He reports a balanced diet.  His weight has been stable exception of recently lost weight during diuresis.  He does drink a protein shake in the morning.  -Vision:  Has previously been dramatically affected by GVHD. Currently doing well with no concerns today.       Therapies/HEP,  PT: Previoiusly for foot drop, was 1 session.  He is concerned about returning as he got COVID from his last  visit and is very cognizant of developing infections given his immunocompromise state.      Functionally,  He is currently independent with functional mobility in all ADLs and IADLs.  However when he has developed infections or sudden worsening disease he has relied on his wife, who is a nurse to help with activities such as lower body dressing.           Past Medical and Surgical History:     Past Medical History:   Diagnosis Date     ALL (acute lymphocytic leukemia) (H)      Cancer (H)     renal cell     CKD (chronic kidney disease)      H/O peripheral stem cell transplant (H)      Thyroid disease      Past Surgical History:   Procedure Laterality Date     BACK SURGERY  2017     BONE MARROW BIOPSY, BONE SPECIMEN, NEEDLE/TROCAR Left 9/2/2021    Procedure: BIOPSY, BONE MARROW;  Surgeon: Jailyn Ny APRN CNP;  Location: [facility] OR     BONE MARROW BIOPSY, BONE SPECIMEN, NEEDLE/TROCAR Left 11/15/2021    Procedure: BIOPSY, BONE MARROW;  Surgeon: Socrates De La Torre;  Location: [facility] OR     BONE MARROW BIOPSY, BONE SPECIMEN, NEEDLE/TROCAR Left 2/7/2022    Procedure: BIOPSY, BONE MARROW;  Surgeon: Renetta Wiggins PA-C;  Location: [facility] OR     BONE MARROW BIOPSY, BONE SPECIMEN, NEEDLE/TROCAR Left 8/18/2022    Procedure: BIOPSY, BONE MARROW;  Surgeon: Lorrie Yap PA-C;  Location: [facility] OR     BRONCHOSCOPY (RIGID OR FLEXIBLE), DIAGNOSTIC N/A 4/29/2022    Procedure: BRONCHOSCOPY, WITH BRONCHOALVEOLAR LAVAGE;  Surgeon: Murtaza Garcia MD;  Location: [facility] GI     IR CVC TUNNEL PLACEMENT > 5 YRS OF AGE  8/4/2021     IR CVC TUNNEL REMOVAL LEFT  9/2/2021     IR LIVER BIOPSY PERCUTANEOUS  2/21/2022     PERCUTANEOUS BIOPSY LIVER N/A 2/21/2022    Procedure: NEEDLE BIOPSY, LIVER, PERCUTANEOUS;  Surgeon: Trace Castellanos MD;  [place] [facility] [place]            Social History:     Social History     Tobacco Use     Smoking status: Former     Packs/day: 2.00     Years: 30.00     Pack years: 60.00     Types: Cigarettes      Quit date: 5/4/2019     Years since quitting: 3.6     Smokeless tobacco: Never   Substance Use Topics     Alcohol use: Not Currently       Marital Status:  45 years to his wife Lamar.  She works as a care coordinator in the [email or web address] system.  They have 3 kids and 13 grandkids.  Living situation: Nik lives with his wife in [place].  Multilevel home with several stairs to enter.  It is 6 stairs to get up to the main level and 6 stairs down from the entrance further other level of her home.  All needs can be met on the upper level.  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  Is not sure if they will go back as they are about about to sell the company.         Functional history:     Nik Nava is independent with all aspects of life.    ADLs: Independent   Assistive devices: None  iADLs (medication management and finances): Independent  Hand dominance: Right  Driving: Unknown           Family History:     Family History   Problem Relation Age of Onset     Lung Cancer Mother      Polycythemia Father      Coronary Artery Disease Maternal Grandmother             Medications:     Current Outpatient Medications   Medication Sig Dispense Refill     acyclovir (ZOVIRAX) 800 MG tablet Take 1 tablet (800 mg) by mouth 2 times daily 60 tablet 1     amLODIPine (NORVASC) 2.5 MG tablet Take 1 tablet (2.5 mg) by mouth daily HOLD as of 12/6 30 tablet 1     azithromycin (ZITHROMAX) 250 MG tablet Take 1 tablet (250 mg) by mouth daily 30 tablet 3     Belumosudil Mesylate 200 MG TABS Take 200 mg by mouth daily 30 tablet 3     Carboxymethylcell-Glycerin PF (REFRESH RELIEVA PF) 0.5-1 % SOLN Apply 1 drop to eye 4 times daily Preservative free. Either PF multidose bottle or PF vials 30 each 11     carboxymethylcellulose PF (CARBOXYMETHYLCELLULOSE SODIUM) 0.5 % ophthalmic solution Place 1 drop into both eyes 4 times  daily 70 each 11     chlorthalidone (HYGROTON) 25 MG tablet Take 0.5 tablets (12.5 mg) by mouth daily 15 tablet 6     clobetasol (TEMOVATE) 0.05 % external ointment Apply twice daily as needed for sore in the mouth 30 g 3     dexamethasone alcohol-free (DECADRON) 0.1 MG/ML solution Swish and spit 20 mLs (2 mg) in mouth 4 times daily 1000 mL 3     erythromycin (ROMYCIN) 5 MG/GM ophthalmic ointment Place 0.5 inches into both eyes At Bedtime 3.5 g 4     escitalopram (LEXAPRO) 10 MG tablet Take 1 tablet (10 mg) by mouth daily 30 tablet 3     fluorometholone (FML LIQUIFILM) 0.1 % ophthalmic suspension Place 1 drop into both eyes 2 times daily For total of 21 days then stop 5 mL 4     levothyroxine (SYNTHROID/LEVOTHROID) 150 MCG tablet Take 1 tablet (150 mcg) by mouth daily 30 tablet 1     LORazepam (ATIVAN) 1 MG tablet Take 1 tablet (1 mg) by mouth nightly as needed for anxiety or sleep 30 tablet 3     magnesium oxide (MAG-OX) 400 MG tablet Take 1 tablet (400 mg) by mouth 2 times daily 120 tablet 1     nicotine polacrilex (NICORETTE) 4 MG gum Take 4 mg by mouth as needed for smoking cessation        omeprazole (PRILOSEC) 40 MG DR capsule Take 1 capsule (40 mg) by mouth daily 30 capsule 3     posaconazole (NOXAFIL) 100 MG EC tablet Take 3 tablets (300 mg) by mouth every morning 90 tablet 3     predniSONE (DELTASONE) 10 MG tablet Current dose is 15mg/day 30 tablet 1     predniSONE (DELTASONE) 5 MG tablet Current dose is 15mg/day 60 tablet 3     rosuvastatin (CRESTOR) 5 MG tablet Take 1 tablet (5 mg) by mouth daily 30 tablet 3     sennosides (SENOKOT) 8.6 MG tablet Take 1 tablet by mouth 3 times daily as needed for constipation 90 tablet 0     sodium chloride 0.9 % neb solution 3 mLs by Other route 2 times daily 300 mL 11     traZODone (DESYREL) 50 MG tablet Take 1 tablet (50 mg) by mouth At Bedtime 30 tablet 3            Allergies:     Allergies   Allergen Reactions     Sulfamethoxazole W/Trimethoprim Rash               "ROS:     A focused ROS is negative other than the symptoms noted above in the HPI.      Constitutional: denies any fevers, chills, any recent weight loss   Eyes: denies changes in visual acuity; has had chronic vision changes related to GVHD  Ears, Nose, Throat: denies any difficulty swallowing   Cardiovascular: denies any exertional chest pain or palpitation  Respiratory: shortness of breath with activity is stable  Gastrointestinal: denies any nausea, vomiting, abdominal pain, diarrhea or constipation   Genitourinary: denies any dysuria, hematuria, frequency or urgency; Has LUTS overnight.  Musculoskeletal: denies any muscle pain, joint pain, neck pain or back pain   Neurologic: denies any headache, changes in motor or sensory function, no loss of balance or vertigo   Psychiatric: denies mood changes; sleep is fair to poor             Physical Examiniation:     VITAL SIGNS: There were no vitals taken for this visit.   BMI: Estimated body mass index is 32.32 kg/m  as calculated from the following:    Height as of 12/1/22: 1.854 m (6' 1\").    Weight as of 12/13/22: 111.1 kg (245 lb).  Limited given nature of video exam    Gen: NAD, pleasant and cooperative   HEENT:  normocepalic, without obvious abnormality  Pulm: non-labored breathing in room air  Neuro/MSK: Awake, alert, oriented to conversation, standing and moving all extremities with at least antigravity strength            Laboratory/Imaging:     Oncology Visit on 12/13/2022   Component Date Value Ref Range Status     (1,3)-Beta-D-Glucan 12/13/2022 <31  pg/mL Final     B-D GLUCAN INTERPRETATION (1,3) 12/13/2022 Negative  Negative Final    INTERPRETIVE INFORMATION: (1,3)-beta-D-glucan (Fungitell)      Less than 31 pg/mL ................... Negative    31-59 pg/mL .......................... Negative    60-79 pg/mL .......................... Indeterminate    Greater than or equal to 80 pg/mL .... Positive    The Fungitell test is indicated for presumptive " diagnosis   of fungal infection and should be used in conjunction with   other diagnostic procedures. This test does not detect   certain fungal species such as Cryptococcus, which produce   very low levels of (1,3)-beta-D-glucan. This test will not   detect the zygomycetes, such as Absidia, Mucor, and   Rhizopus, which are not known to produce   (1,3)-beta-D-glucan. In addition, the yeast phase of   Blastomyces dermatitidis produces little   (1,3)-beta-D-glucan and may not be detected by the assay.  Performed By: [facility]  [address] [place] [id number]  : Juwan Bernard MD, PhD     CMV DNA IU/mL 12/13/2022 Not Detected  Not Detected IU/mL Final     Magnesium 12/13/2022 1.8  1.7 - 2.3 mg/dL Final     Sodium 12/13/2022 128 (L)  136 - 145 mmol/L Final     Potassium 12/13/2022 5.3  3.4 - 5.3 mmol/L Final     Chloride 12/13/2022 93 (L)  98 - 107 mmol/L Final     Carbon Dioxide (CO2) 12/13/2022 25  22 - 29 mmol/L Final     Anion Gap 12/13/2022 10  7 - 15 mmol/L Final     Urea Nitrogen 12/13/2022 37.6 (H)  8.0 - 23.0 mg/dL Final     Creatinine 12/13/2022 1.28 (H)  0.67 - 1.17 mg/dL Final     Calcium 12/13/2022 10.2  8.8 - 10.2 mg/dL Final     Glucose 12/13/2022 95  70 - 99 mg/dL Final     Alkaline Phosphatase 12/13/2022 116  40 - 129 U/L Final     AST 12/13/2022 33  10 - 50 U/L Final     ALT 12/13/2022 40  10 - 50 U/L Final     Protein Total 12/13/2022 6.4  6.4 - 8.3 g/dL Final     Albumin 12/13/2022 4.1  3.5 - 5.2 g/dL Final     Bilirubin Total 12/13/2022 1.1  <=1.2 mg/dL Final     GFR Estimate 12/13/2022 62  >60 mL/min/1.73m2 Final    Effective December 21, 2021 eGFRcr in adults is calculated using the 2021 CKD-EPI creatinine equation which includes age and gender (Omar et al., NEJ, DOI: 10.1056/ZVJQbq1824543)     WBC Count 12/13/2022 8.3  4.0 - 11.0 10e3/uL Final     RBC Count 12/13/2022 3.11 (L)  4.40 - 5.90 10e6/uL Final     Hemoglobin 12/13/2022 11.0 (L)  13.3 - 17.7  g/dL Final     Hematocrit 12/13/2022 30.6 (L)  40.0 - 53.0 % Final     MCV 12/13/2022 98  78 - 100 fL Final     MCH 12/13/2022 35.4 (H)  26.5 - 33.0 pg Final     MCHC 12/13/2022 35.9  31.5 - 36.5 g/dL Final     RDW 12/13/2022 15.0  10.0 - 15.0 % Final     Platelet Count 12/13/2022 102 (L)  150 - 450 10e3/uL Final     % Neutrophils 12/13/2022 63  % Final     % Lymphocytes 12/13/2022 28  % Final     % Monocytes 12/13/2022 8  % Final     % Eosinophils 12/13/2022 0  % Final     % Basophils 12/13/2022 0  % Final     % Immature Granulocytes 12/13/2022 1  % Final     NRBCs per 100 WBC 12/13/2022 0  <1 /100 Final     Absolute Neutrophils 12/13/2022 5.2  1.6 - 8.3 10e3/uL Final     Absolute Lymphocytes 12/13/2022 2.3  0.8 - 5.3 10e3/uL Final     Absolute Monocytes 12/13/2022 0.7  0.0 - 1.3 10e3/uL Final     Absolute Eosinophils 12/13/2022 0.0  0.0 - 0.7 10e3/uL Final     Absolute Basophils 12/13/2022 0.0  0.0 - 0.2 10e3/uL Final     Absolute Immature Granulocytes 12/13/2022 0.1  <=0.4 10e3/uL Final     Absolute NRBCs 12/13/2022 0.0  10e3/uL Final              Assessment/Plan:     Nik Nava is a 62 year old male with cancer history of 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     Overall, Nik and his wife report that in the last few weeks he is actually made some solid improvement in his general conditioning as he is still slowly recovering from recent influenza during a brief hospitalization.  Their main concern today is developing a home exercise program that he can do inside as there is concerns given his immunosuppression of being outside for too long.  They are hoping to improve his physical condition so he has better reserves for when he becomes  sick or has disease recurrence.  We discussed that we will try to get virtual physical therapy ordered for them to accommodate the concerns about sandrine infections in the public.  Given his foot drop is resolved no need for bracing therapy for this.  As for his sleep we advised a trial melatonin 5 mg each evening and taking that 2 hours prior to desired bedtime.  We discussed he can continue using the lorazepam as needed.  Since trazodone has an drowsy to the point where he is fallen we advised against using this at this time.    1. Patient education: In depth discussion and education was provided about the assessment and implications of each of the below recommendations for management. Patient indicated readiness to learn, all questions were answered and understanding of material presented was confirmed.  2. Work-up: None  3. Therapy/equipment/braces:  1. We will try to get virtual physical therapy ordered for him.  If this does not work we can discuss with the patient whether they would prefer home PT or come into the clinic.  4. Medications:  1. Continue lorazepam as needed.  2. Trial melatonin 5 mg each evening. Advised 2 hours prior to bed time.   5. Interventions: None  6. Referral / follow up with other providers: None  7. Follow up: 3-month virtual follow-up    Patient was seen and discussed with attending physician, Dr. Mcdonough.     Simón Rory DO  PGY3 PM&R  [facility]  Pager: [phone]    Physician Attestation   I, Carmen Mcdonough MD, saw this patient and agree with the findings and plan of care as documented in the note.      Items personally reviewed/procedural attestation: vitals, labs and imaging and agree with the interpretation documented in the note.    Carmen Mcdonough MD     I appreciate the opportunity to participate in the care of your patient.     90 minutes spent on the date of the encounter doing chart review, history and exam, documentation and further activities as  noted above.                Again, thank you for allowing me to participate in the care of your patient.        Sincerely,        Carmen Mcdonough MD

## 2022-12-21 ENCOUNTER — TELEPHONE (OUTPATIENT)
Dept: ONCOLOGY | Facility: CLINIC | Age: 62
End: 2022-12-21

## 2022-12-27 ENCOUNTER — ONCOLOGY VISIT (OUTPATIENT)
Dept: TRANSPLANT | Facility: CLINIC | Age: 62
End: 2022-12-27
Attending: INTERNAL MEDICINE
Payer: COMMERCIAL

## 2022-12-27 ENCOUNTER — APPOINTMENT (OUTPATIENT)
Dept: LAB | Facility: CLINIC | Age: 62
End: 2022-12-27
Attending: INTERNAL MEDICINE
Payer: COMMERCIAL

## 2022-12-27 VITALS
TEMPERATURE: 98.4 F | WEIGHT: 246.3 LBS | SYSTOLIC BLOOD PRESSURE: 104 MMHG | DIASTOLIC BLOOD PRESSURE: 66 MMHG | HEIGHT: 73 IN | RESPIRATION RATE: 16 BRPM | HEART RATE: 102 BPM | BODY MASS INDEX: 32.64 KG/M2 | OXYGEN SATURATION: 95 %

## 2022-12-27 DIAGNOSIS — Z94.81 S/P BONE MARROW TRANSPLANT (H): ICD-10-CM

## 2022-12-27 DIAGNOSIS — C91.01 ACUTE LYMPHOBLASTIC LEUKEMIA (ALL) IN REMISSION (H): Primary | ICD-10-CM

## 2022-12-27 DIAGNOSIS — Z94.81 STATUS POST BONE MARROW TRANSPLANT (H): ICD-10-CM

## 2022-12-27 DIAGNOSIS — C91.01 ACUTE LYMPHOBLASTIC LEUKEMIA (ALL) IN REMISSION (H): ICD-10-CM

## 2022-12-27 LAB
ALBUMIN SERPL BCG-MCNC: 4 G/DL (ref 3.5–5.2)
ALP SERPL-CCNC: 102 U/L (ref 40–129)
ALT SERPL W P-5'-P-CCNC: 40 U/L (ref 10–50)
ANION GAP SERPL CALCULATED.3IONS-SCNC: 11 MMOL/L (ref 7–15)
AST SERPL W P-5'-P-CCNC: 38 U/L (ref 10–50)
BASOPHILS # BLD AUTO: 0 10E3/UL (ref 0–0.2)
BASOPHILS NFR BLD AUTO: 0 %
BILIRUB SERPL-MCNC: 0.8 MG/DL
BUN SERPL-MCNC: 40 MG/DL (ref 8–23)
CALCIUM SERPL-MCNC: 10.1 MG/DL (ref 8.8–10.2)
CHLORIDE SERPL-SCNC: 101 MMOL/L (ref 98–107)
CREAT SERPL-MCNC: 1.35 MG/DL (ref 0.67–1.17)
DEPRECATED HCO3 PLAS-SCNC: 25 MMOL/L (ref 22–29)
EOSINOPHIL # BLD AUTO: 0 10E3/UL (ref 0–0.7)
EOSINOPHIL NFR BLD AUTO: 0 %
ERYTHROCYTE [DISTWIDTH] IN BLOOD BY AUTOMATED COUNT: 16.9 % (ref 10–15)
GFR SERPL CREATININE-BSD FRML MDRD: 59 ML/MIN/1.73M2
GLUCOSE SERPL-MCNC: 130 MG/DL (ref 70–99)
HCT VFR BLD AUTO: 31.3 % (ref 40–53)
HGB BLD-MCNC: 10.5 G/DL (ref 13.3–17.7)
IMM GRANULOCYTES # BLD: 0.1 10E3/UL
IMM GRANULOCYTES NFR BLD: 1 %
LYMPHOCYTES # BLD AUTO: 2.9 10E3/UL (ref 0.8–5.3)
LYMPHOCYTES NFR BLD AUTO: 29 %
MAGNESIUM SERPL-MCNC: 1.7 MG/DL (ref 1.7–2.3)
MCH RBC QN AUTO: 34.7 PG (ref 26.5–33)
MCHC RBC AUTO-ENTMCNC: 33.5 G/DL (ref 31.5–36.5)
MCV RBC AUTO: 103 FL (ref 78–100)
MONOCYTES # BLD AUTO: 1.1 10E3/UL (ref 0–1.3)
MONOCYTES NFR BLD AUTO: 11 %
NEUTROPHILS # BLD AUTO: 6.1 10E3/UL (ref 1.6–8.3)
NEUTROPHILS NFR BLD AUTO: 59 %
NRBC # BLD AUTO: 0.1 10E3/UL
NRBC BLD AUTO-RTO: 1 /100
PLATELET # BLD AUTO: 123 10E3/UL (ref 150–450)
POTASSIUM SERPL-SCNC: 4.4 MMOL/L (ref 3.4–5.3)
PROT SERPL-MCNC: 6.2 G/DL (ref 6.4–8.3)
RBC # BLD AUTO: 3.03 10E6/UL (ref 4.4–5.9)
SODIUM SERPL-SCNC: 137 MMOL/L (ref 136–145)
T4 FREE SERPL-MCNC: 1.92 NG/DL (ref 0.9–1.7)
TSH SERPL DL<=0.005 MIU/L-ACNC: 0.06 UIU/ML (ref 0.3–4.2)
WBC # BLD AUTO: 10.3 10E3/UL (ref 4–11)

## 2022-12-27 PROCEDURE — 85025 COMPLETE CBC W/AUTO DIFF WBC: CPT

## 2022-12-27 PROCEDURE — 80053 COMPREHEN METABOLIC PANEL: CPT

## 2022-12-27 PROCEDURE — 94642 AEROSOL INHALATION TREATMENT: CPT | Performed by: INTERNAL MEDICINE

## 2022-12-27 PROCEDURE — G0463 HOSPITAL OUTPT CLINIC VISIT: HCPCS

## 2022-12-27 PROCEDURE — 94640 AIRWAY INHALATION TREATMENT: CPT | Performed by: INTERNAL MEDICINE

## 2022-12-27 PROCEDURE — 36591 DRAW BLOOD OFF VENOUS DEVICE: CPT

## 2022-12-27 PROCEDURE — 82040 ASSAY OF SERUM ALBUMIN: CPT

## 2022-12-27 PROCEDURE — 84439 ASSAY OF FREE THYROXINE: CPT

## 2022-12-27 PROCEDURE — 99214 OFFICE O/P EST MOD 30 MIN: CPT

## 2022-12-27 PROCEDURE — 250N000011 HC RX IP 250 OP 636: Performed by: INTERNAL MEDICINE

## 2022-12-27 PROCEDURE — 83735 ASSAY OF MAGNESIUM: CPT

## 2022-12-27 PROCEDURE — 84443 ASSAY THYROID STIM HORMONE: CPT

## 2022-12-27 PROCEDURE — 82784 ASSAY IGA/IGD/IGG/IGM EACH: CPT

## 2022-12-27 PROCEDURE — 99212 OFFICE O/P EST SF 10 MIN: CPT

## 2022-12-27 RX ORDER — HEPARIN SODIUM (PORCINE) LOCK FLUSH IV SOLN 100 UNIT/ML 100 UNIT/ML
5 SOLUTION INTRAVENOUS ONCE
Status: COMPLETED | OUTPATIENT
Start: 2022-12-27 | End: 2022-12-27

## 2022-12-27 RX ORDER — ACYCLOVIR 800 MG/1
800 TABLET ORAL 2 TIMES DAILY
Qty: 60 TABLET | Refills: 1 | Status: SHIPPED | OUTPATIENT
Start: 2022-12-27 | End: 2023-02-23

## 2022-12-27 RX ORDER — ALBUTEROL SULFATE 0.83 MG/ML
2.5 SOLUTION RESPIRATORY (INHALATION)
Status: DISCONTINUED | OUTPATIENT
Start: 2022-12-27 | End: 2022-12-27 | Stop reason: HOSPADM

## 2022-12-27 RX ORDER — LEVOTHYROXINE SODIUM 150 UG/1
150 TABLET ORAL DAILY
Qty: 30 TABLET | Refills: 1 | Status: SHIPPED | OUTPATIENT
Start: 2022-12-27 | End: 2023-03-01

## 2022-12-27 RX ORDER — ALBUTEROL SULFATE 0.83 MG/ML
2.5 SOLUTION RESPIRATORY (INHALATION)
Status: CANCELLED
Start: 2023-01-22

## 2022-12-27 RX ORDER — PENTAMIDINE ISETHIONATE 300 MG/300MG
300 INHALANT RESPIRATORY (INHALATION)
Status: DISCONTINUED | OUTPATIENT
Start: 2022-12-27 | End: 2022-12-27 | Stop reason: HOSPADM

## 2022-12-27 RX ORDER — PENTAMIDINE ISETHIONATE 300 MG/300MG
300 INHALANT RESPIRATORY (INHALATION)
Status: CANCELLED
Start: 2023-01-22

## 2022-12-27 RX ADMIN — PENTAMIDINE ISETHIONATE 300 MG: 300 INHALANT RESPIRATORY (INHALATION) at 12:42

## 2022-12-27 RX ADMIN — ALBUTEROL SULFATE 2.5 MG: 0.83 SOLUTION RESPIRATORY (INHALATION) at 12:41

## 2022-12-27 RX ADMIN — Medication 5 ML: at 11:28

## 2022-12-27 ASSESSMENT — PAIN SCALES - GENERAL: PAINLEVEL: NO PAIN (0)

## 2022-12-27 NOTE — NURSING NOTE
"Chief Complaint   Patient presents with     Port Draw     Labs drawn via port by RN. Vitals taken.     Labs drawn via port by RN. Port accessed with 20G 3/4\" power needle. Flushed with NS and heparin. De-accessed. Pt tolerated well. Vitals taken. Pt checked in for next appointment.    Suma Andino, RN  "

## 2022-12-27 NOTE — PROGRESS NOTES
Nik HARRY Vegasshabana was seen today for a Pentamidine nebulizer tx ordered by Dr. Chris Cote.    Patient was first given 2.5 mg of albuterol nebulizer, after which Pentamidine 300 mg inhalation solution mixed with 6cc Sterile H20 was administered through a filtered nebulizer.    No adverse side effects noted by the patient.    This service today was provided with Dr. Roe Willis,a physician on duty, who was available if needed.     Procedure was completed by Robert Mora.

## 2022-12-27 NOTE — PROGRESS NOTES
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    HPI:      He is feeling good today - his eyes are feeling much better. He has not had any flares of his GVH since decreasing the pred to 10 mg. His appetite has been good - he has been salting his food rather than drinking any electrolyte drinks as he just can't tolerate these. His BP and weight have been stable at home - he occasionally feels lightheaded while walking just after standing up but not truly positional. He denies any falls or syncope.    Review of Systems                                                                                                                                         10 point Review of systems was negative except as detailed above     PHYSICAL EXAM                                                                                                                                                   KPS: 80    /66   Pulse 102   Temp 98.4  F (36.9  C) (Oral)   Resp 16   Ht 1.854 m (6' 0.99\")   Wt 111.7 kg (246 lb 4.8 oz)   SpO2 95%   BMI 32.50 kg/m      104/66 is standing BP.  Sitting BP is 122/77    Wt Readings from Last 4 Encounters:   12/27/22 111.7 kg (246 lb 4.8 oz)   12/13/22 111.1 kg (245 lb)   12/06/22 111.6 kg (246 lb 1.6 oz)   12/01/22 111.1 kg (245 lb)      General: NAD.  HEENT: sclera anicteric, injected conjunctivae. No lichenoid changes in oral mucosa, no ulcers seen.    Lungs:  CTA b/l  no wheezes  CV: RRR  no gallop  Abd; soft, NABS, protuberant  Ext/ Skin: no rash.  LE 1+ bilateral edema in lower extremities; wearing compression socks-improved  Access: port-a-cath    cGVHD therapy started on February 24 2022  - Baseline NIH scoring 2/24/2022 : skin 2 maculopapular rash/erythema with no sclerotic features, mouth score 1 mild symptoms with lichenoid changes less than 25%, eyes score 2 moderate symptoms without " new visual impairments due to KCS requiring lubricant eyedrops more than 3 times a day, overall mild scale for her provider  - 3/25/2022 1 month NIH treatment assessment on PQRST: skin 2, mouth score 1 mild symptoms with NO lichenoid changes, eyes score 2 moderate symptoms w requiring lubricant eyedrops more than 3 times a day, liver score 1 ALT 3.7x ULN and nl bilirubin   - 3/31  Mouth clear; eyes minimal LFT still abn; no joint or new GI sx  - 4/28 Mouth clear, Left>R eye is mild sclera erythema, lids are pink and irritated appearing, LFT's slow improvement, no new joint, skin, or GI symptoms.   -5/11 mouth with mild erythema but no lichenoid changes, eyes using eyedrops 4-6 times a day score of 2, LFTs significantly improved from baseline slight elevation in alk phos and AST with normal bilirubin, skin with hyperpigmentation from old acute GVHD no active skin rashes, no GI symptoms no musculoskeletal complaints.  -5/26 Mouth with very slight lichenoid changes, erythema.  Eyes still using eyedrops 4-6x per day.  LFTs essentially normal with slight elevation in alk phos.  Skin with hyperpigmentation from old acute GVHD, no active rashes, no GI or MSK concerns.  Skin 0, mouth 0 but with lichenoid changes, eyes 2  -6/7/22 Mouth with no lichenoid changes, erythema.  Eyes still using eyedrops 4-6x per day.  LFTs essentially normal with slight elevation in alk phos.  Skin with hyperpigmentation from old acute GVHD, no active rashes, no GI or MSK concerns.  Skin 0, mouth 0, eyes 2     -6 month PQRST assessment 9/6/2022: eyes 3, mouth 0 for symptoms, 25% lichenoid changes (1), liver/GI/skin 0    11/8/2022, 11/22/2022, 12/13/22 cGVHD NIG scoring eyes 3, no other organ involvement clinically    Lab:    Lab Results   Component Value Date    WBC 10.3 12/27/2022    ANEU 3.5 10/26/2021    HGB 10.5 (L) 12/27/2022    HCT 31.3 (L) 12/27/2022     (L) 12/27/2022     12/27/2022    POTASSIUM 4.4 12/27/2022    CHLORIDE 101  12/27/2022    CO2 25 12/27/2022     (H) 12/27/2022    BUN 40.0 (H) 12/27/2022    CR 1.35 (H) 12/27/2022    MAG 1.7 12/27/2022    INR 0.90 12/01/2022    BILITOTAL 0.8 12/27/2022    AST 38 12/27/2022    ALT 40 12/27/2022    ALKPHOS 102 12/27/2022    PROTTOTAL 6.2 (L) 12/27/2022    ALBUMIN 4.0 12/27/2022       ASSESSMENT AND PLAN   Nik Nava is a 62 year old male with Ph Pos ALL, day 504 s/p sib allo stem cell transplant    Day -6 (8/4): flu/cy  Day -5 through day -2 (8/5-8/8): flu  Day -1 (8/9): TBI  Day 0 (8/10): transplant     1.  Acute lymphoblastic leukemia, [place] chromosome positive in CR, MRD neg. S/p allo sib PBSCT. (ABO matched)  HCT-CI score: 3 (prior solid tumor)  Day +100 bone marrow biopsy is 100% donor with no morphologic or flow cytometric evidence of leukemia BCR abl is pending.  - Day +180 restaging BM bx- still in CR  100% donor;  2/16/22 BCR ABL major breakpoint undetectable.   - 1 year restaging 8/18/2022: Markedly hypocellular bone marrow [less than 10% cellular] with maturing trilineage hematopoiesis and no definite morphologic or immunophenotypic evidence for involvement by B lymphoblastic leukemia. BCRABL1 PCR not detected, bone marrow % donor. FISH NORMAL No evidence of BCR-ABL1 fusion  - Maintenance with TKI posttransplantation given his Ph positive ALL that have not been started previously presumed secondary to multiple active issues specifically infections and COVID.  Per Avila Tobar: will reconsider this patient will think about whether this is something he would like to consider he was previously on Dasatinib, we would start on a low dose and increase as tolerated.  This is on hold now pending active GVHD therapy, we discussed on this visit 10/18 in agreement with holding off.     2.  HEME:  - Pulmonary emboli: He developed a pulmonary emboli in the setting of receiving PEG asparaginase (ie provoked thrombus).  Xarelto complete.   -At 1 year anniversary visit  completed screening for secondary iron overload, 8/2022 ferritin 1023, recheck on 10/18 increased to 2348, expect this is acute phase reactant with recent GVHD flare.    - 11/8 ferritin 2812, MRI completed 11/2022 confirming iron overload, therefore today 11/22 I discussed with the patient starting phlebotomies as I worry about his tolerance to iron chelators specifically regarding his renal function, will start phlebotomies 250 mL every 4 weeks as tolerated and plan to keep his hemoglobin more than 11-volume can be titrated as well as frequency depending on his tolerance and hemoglobin as well as follow-up ferritin levels.      3.  FEN/Renal:  - Cr improved with switching patient to sirolimus briefly, then with increased Cr on 10/11 with third spacing. Total protein to creatinine ratio 0.41 on 10/11 recheck 0.39 on 10/18. 11/8 creatinine up to 1.4, 11/22 creatinine down to 1.25   - He has a history of renal cancer and resection. Has a single kidney.  - 11/1/2022 seen by nephrology: multifactorial, focus on BP control, Started Chlorthalidone 12.5 mg daily and reduce amlodipine to 2.5 mg per day, Low salt diet and reduce ice tea. Next step: may increase chlorthalidone if Cr stable but if not improving in swelling, may start torsemide Follow-up in 2 month   -Hypomagnesemia,11/8 decrease magnesium to 2 tablets 2 times a day-seems to have triggered more muscle cramps specifically in his right arm cussed with the patient uptitrating that back up for symptom control of his cramps, he continues on vitamin E for muscle cramps as well.     4.    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   #Skin GVHD- history of biopsy proven GVHD of the skin 8/30/2021; resolved.   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 WNL today 11/22  # Ocular GVHD: follows with ophthalmology. Maxitrol to lids, continue refreshing drops QID. Score 3  Followed closely by   Larry and had ocular fittings 11/8 however no lens seen on exam today he will follow-up with her team for refitting   # Oral GVHD: dex s/s three-4 times a day; tac ointment to lips as needed.  Clotrimazole cream added after seeing dermatology.  Lichenoid changes have resolved with no other ulcers since 11/8     Previous therapies  Tacrolimus-previously decided to stay on same dose, no checks of levels if clinically stable, and no need switch to sirolimus. (keep tac low as gvhd is quiet and viremia). 5/10 level 45 with starting of COVID therapy and D-D intractions (Paxlovid for COVID19, which has a drug interaction with tacrolimus-Ritonavir increases serum levels of tacrolimus).   Tacrolimus level subtherapeutic, increase to 2.5 mg twice daily recently, 8/23/2022 instructed to change tacrolimus to 2 mg twice daily. 9/6 with mild NEGRA, hyperkalemia, hypomagnesemia, and reports some tremors and cramps, suspect tacrolimus to be high therefore empirically decreased to 1.5 twice daily, skip morning dose of tacrolimus and took 2 mg today after his afternoon labs. Decrease to 1mg BID on Saturday.  Sirolimus  9/21 despite fluids and a dose adjustment of tacrolimus patient seem to have persistence NORBERTO related NEGRA, therefore after discussion with the patient decision was made to transition to sirolimus that was started on 9/21, patient initially seem to tolerate this well with normalization of kidney function  10/11 presented with flares of ocular and oral GVHD, fluid retention and gain of 5-6 kg, lower extremity edema, abdominal distention, total protein to creatinine ratio elevated at 0.4, in addition to elevation in BUN and creatinine, HTN.  Picture most consistent with sirolimus related side effects.  Less likely that the patient will tolerate even a lower level of sirolimus.  Therefore the patient was instructed to stop sirolimus, last dose on 10/10. 10/14 level 3.5 appropriately trending  down.     Corticosteroids  3/1-3/16: prednisone 100mg every day  3/17 75mg every day   3/31 75 alt with 50  4/6: 75 alt with 25mg every other day  4/12 pred tapered to 60 alternating with 25mg   4/15 In setting of viruses trying to reactivate,GVHD stable: Taper 60/15mg alternating.  4/20 decrease pred further to 50/10.    4/25 taper pred to 50/0 every other day as long as symptoms stable.   5/2 Pred decrease 40/0 alternating every other day.   5/13 decrease to 30/0  5/20 decrease to 20/0 then slowly by 5 mg weekly  6/7 decrease to 10/0  Went up to 15/0 with mild increased LFTs  8/23/2022 increased to 20/0, with persistence of oral ulcers and active ocular GVHD.  Discussed with the patient the importance of stopping chewing of nicotine gums for prolonged hours as that would not help with the healing of the oral ulcers.  I discussed with the patient alternatives of nicotine patches that he will reconsider and let me know.  9/6 decreased to 15 alternating with 0  9/13 decreased to 10 alternating with 0  9/21 discontinued tacrolimus given persistent NEGRA and single kidney and start the patient on sirolimus without loading dose.  We will get a list of possible side effects and adverse events from the Pharm.D. see sirolimus section above.  10/11 GVHD flare. Sirolimus stopped. Resume prednisone 40 mg daily as of 10/12, no change with mildly worsening eye pain 10/14  Reduce pred to 35mg 10/25 and 25mg 11/1 11/8 20 mg   11/22 15 mg  12/13/22 10 mg (plan to taper to 5mg then slow taper 1 mg per month given long pred history)    Belumosudil  Patient intolerant/with disease flares on tacrolimus and sirolimus see above.  Discussed with the patient the different options for therapy of chronic GVHD that are FDA approved.  Given the side effect profiles after discussing in detail and given the least nephrotoxicity and expected response, taking into account other metabolic effect as well.  We will proceed with prior authorization  with belumosudil.  Patient was given material to review for possible expected adverse events and side effects with both Belumosodil and ruxolitinib.   --- Started on 10/18 in p.m. - feeling tired with malaise since starting and worried the drug is responsible     5.  ID: Afebrile with no current signs or symptoms of infection.  He recently was admitted following influenza and is now improved    # cat bite/scratch: doxy 100mg BID x 10 days; completes 11/3/22.  Bite has healed, no signs of infection.     #History of COVID   Positive via homed test 5/8. Treated with Paxlovid with resolution of symptoms.  Had recurrence on 5/18. Resolved  Covid 11/21, 12/21, received his influenza annual vaccine as well as COVID boosters locally 11/16     #History of pulmonary infiltrates:   4/20 CT chest with new RUL infiltrate. Highly immunocompromised. 4/22 Fungitell/asp GM were neg. BAL 4/29 NGTD, increased micafungin to 150mg IV daily-- f/u 6/1 Dr Garcia CT with mixed results, followed with Dr. Garcia August 2022 with resolution of pulmonary nodules and normalization of LFTs we will switch patient will switch patient to posaconazole prophylactic dose and monitor LFTs and any infectious concerns closely (wnl stable 10/14)     #History of CMV viremia:    - CMV viremia up to 1100. 4/15 started valcyte 900mg PO BID. 4/20 CMV <137, 5/10 ND, decreased to 450mg BID 4/30 - continue 4 weeks as long as CMV <137 till 5/28 + weekly CMV  - Switch to acyclovir after completion of current therapy 5/28. 10/11 CMV ND. Check monthly on IST, 11/8 CMV <137, 11/22 ND, recheck 12/8     - PPx:   ACV  Posaconazole (previously on micafungin with LFts abl, hx of pulmonary nodules needign treatment dose).   Pentamidine, last 11/22  Azithro 250mg daily (stopped levaquin due to possible tendonitis).      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 S/p 4/22 and 5/4/22 rituximab 375mg/m2 5/10 ND x2, 6/7 ND, 8/18/2022 971, 10/11 ND, check every other  month on IST, 11/8 ND  S/p covid vaccine series 12/2021  S/p shyampalmira   Deferred annual vaccines in the setting of GVHD and immunosuppression therapy     6. Endo:   - Hx of graves disease; On synthroid 175 mcg daily. TSH normal 4/6/2022 no reflex to T4.  Recheck on 10/18 T3 low at 0.03, free T3 and T4 within normal.  Subclinical hyperthyroidism, 11/22/222 and discussion with endocrine, reduce the levothyroxine to 150 mcg/day and repeat labs in 2-3.  He has follow-up appointment with endocrine    - HLD: 11/2022 cholesterol 355, triglycerides 153, -- 11/8 started rosuvastatin 5 mg daily we will recheck lipid profile in 3 months, 11/22 CPK within normal     7. CV:   - Hypertension history - now on low side after weight loss  - TTE most recently 10/2022     8. GI:   -Omeprazole for heartburn  -LFTs as above, now normal. 9/6 decreased ursodiol to daily      9. Psych:   - Situational anxiety - lexapro 10mg daily.   - Insomnia: worse on steroids. Ativan, trazadone, melatonin     10. MSK: left food drop, PT. Occasional muscle cramps discussed optimizing vitamin D levels and considering vitamin D, some of the symptomatology of muscle cramps are likely related to chronic GVHD.     11. HCM/Age appropriate cancer screening:  -Discussed with the patient importance of age-appropriate cancer screening including colonoscopies, he is due for this.  -Discussed importance of at least once a year vitamin D level check, 8/18/2022 wnl  -Screening for secondary iron overload, see heme section above, start vitamins on 12/13  -Yearly TSH 2022 wnl; yearly lipid panel - see GI section above  -9/6/2022 DEXA scan Based on BMD diagnosis is consistent with normal bone density based on WHO criteria Ref. 1   -PFTs 9/20/22, see above  -Metabolic other, 8/2022 testosterone within normal  -11/22/2022 discussed with the patient the challenges and the stresses of chronic illness and healthcare fatigue.  Patient had previously been on Lexapro that  we restarted today confirmed with pharmacy that there are no drug drug interactions.  Additionally we will refer patient to PMNR to help with physical rehab and strength to help with fatigue.  Our social workers will also reach out to Nik and his wife for further resources including support groups for patients with chronic illness and fatigue post transplant.       Today's Summary:  Cont 10 mg until follow up with MD  Na is improved - continue to hydrate and salt food  BP and weight are stable     Follow up with nephrology on 1/10  Follow up with Dr. Grant on 1/12      I spent 30 minutes in the care of this patient today, which included time necessary for preparation for the visit, obtaining history, ordering medications/tests/procedures as medically indicated, review of pertinent medical literature, counseling of the patient, communication of recommendations to the care team, and documentation time.    Moris Ortiz PA-C   *[phone]

## 2022-12-27 NOTE — NURSING NOTE
"Oncology Rooming Note    December 27, 2022 12:08 PM   Nik Nava is a 62 year old male who presents for:    Chief Complaint   Patient presents with     Port Draw     Labs drawn via port by RN. Vitals taken.     Oncology Clinic Visit     [facility] RETURN - ALL     Initial Vitals: /77 (BP Location: Left arm, Patient Position: Sitting, Cuff Size: Adult Regular)   Pulse 102   Temp 98.4  F (36.9  C) (Oral)   Resp 16   Ht 1.854 m (6' 0.99\")   Wt 111.7 kg (246 lb 4.8 oz)   SpO2 95%   BMI 32.50 kg/m   Estimated body mass index is 32.5 kg/m  as calculated from the following:    Height as of this encounter: 1.854 m (6' 0.99\").    Weight as of this encounter: 111.7 kg (246 lb 4.8 oz). Body surface area is 2.4 meters squared.  No Pain (0) Comment: Data Unavailable   No LMP for male patient.  Allergies reviewed: Yes  Medications reviewed: Yes    Medications: Medication refills not needed today.  Pharmacy name entered into [employer]:    [facility] - [place], [place] - [address] [facility] [place], [place] - [address] [place] [address] [id number]  [facility]    Kelvin Gomez LPN            "

## 2022-12-27 NOTE — LETTER
"    12/27/2022         RE: Nik Nava  [id number] [facility] [id number]        Dear Colleague,    Thank you for referring your patient, Nik Nava, to the [facility]LOOD AND MARROW [facility]ANSPLANT PROGRAM [place]. Please see a copy of my visit note below.        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    HPI:      He is feeling good today - his eyes are feeling much better. He has not had any flares of his GVH since decreasing the pred to 10 mg. His appetite has been good - he has been salting his food rather than drinking any electrolyte drinks as he just can't tolerate these. His BP and weight have been stable at home - he occasionally feels lightheaded while walking just after standing up but not truly positional. He denies any falls or syncope.    Review of Systems                                                                                                                                         10 point Review of systems was negative except as detailed above     PHYSICAL EXAM                                                                                                                                                   KPS: 80    /66   Pulse 102   Temp 98.4  F (36.9  C) (Oral)   Resp 16   Ht 1.854 m (6' 0.99\")   Wt 111.7 kg (246 lb 4.8 oz)   SpO2 95%   BMI 32.50 kg/m      104/66 is standing BP.  Sitting BP is 122/77    Wt Readings from Last 4 Encounters:   12/27/22 111.7 kg (246 lb 4.8 oz)   12/13/22 111.1 kg (245 lb)   12/06/22 111.6 kg (246 lb 1.6 oz)   12/01/22 111.1 kg (245 lb)      General: NAD.  HEENT: sclera anicteric, injected conjunctivae. No lichenoid changes in oral mucosa, no ulcers seen.    Lungs:  CTA b/l  no wheezes  CV: RRR  no gallop  Abd; soft, NABS, protuberant  Ext/ Skin: no rash.  LE 1+ bilateral edema in lower extremities; wearing compression " socks-improved  Access: port-a-cath    cGVHD therapy started on February 24 2022  - Baseline NIH scoring 2/24/2022 : skin 2 maculopapular rash/erythema with no sclerotic features, mouth score 1 mild symptoms with lichenoid changes less than 25%, eyes score 2 moderate symptoms without new visual impairments due to KCS requiring lubricant eyedrops more than 3 times a day, overall mild scale for her provider  - 3/25/2022 1 month NIH treatment assessment on PQRST: skin 2, mouth score 1 mild symptoms with NO lichenoid changes, eyes score 2 moderate symptoms w requiring lubricant eyedrops more than 3 times a day, liver score 1 ALT 3.7x ULN and nl bilirubin   - 3/31  Mouth clear; eyes minimal LFT still abn; no joint or new GI sx  - 4/28 Mouth clear, Left>R eye is mild sclera erythema, lids are pink and irritated appearing, LFT's slow improvement, no new joint, skin, or GI symptoms.   -5/11 mouth with mild erythema but no lichenoid changes, eyes using eyedrops 4-6 times a day score of 2, LFTs significantly improved from baseline slight elevation in alk phos and AST with normal bilirubin, skin with hyperpigmentation from old acute GVHD no active skin rashes, no GI symptoms no musculoskeletal complaints.  -5/26 Mouth with very slight lichenoid changes, erythema.  Eyes still using eyedrops 4-6x per day.  LFTs essentially normal with slight elevation in alk phos.  Skin with hyperpigmentation from old acute GVHD, no active rashes, no GI or MSK concerns.  Skin 0, mouth 0 but with lichenoid changes, eyes 2  -6/7/22 Mouth with no lichenoid changes, erythema.  Eyes still using eyedrops 4-6x per day.  LFTs essentially normal with slight elevation in alk phos.  Skin with hyperpigmentation from old acute GVHD, no active rashes, no GI or MSK concerns.  Skin 0, mouth 0, eyes 2     -6 month PQRST assessment 9/6/2022: eyes 3, mouth 0 for symptoms, 25% lichenoid changes (1), liver/GI/skin 0    11/8/2022, 11/22/2022, 12/13/22 cGVHD NIG  scoring eyes 3, no other organ involvement clinically    Lab:    Lab Results   Component Value Date    WBC 10.3 12/27/2022    ANEU 3.5 10/26/2021    HGB 10.5 (L) 12/27/2022    HCT 31.3 (L) 12/27/2022     (L) 12/27/2022     12/27/2022    POTASSIUM 4.4 12/27/2022    CHLORIDE 101 12/27/2022    CO2 25 12/27/2022     (H) 12/27/2022    BUN 40.0 (H) 12/27/2022    CR 1.35 (H) 12/27/2022    MAG 1.7 12/27/2022    INR 0.90 12/01/2022    BILITOTAL 0.8 12/27/2022    AST 38 12/27/2022    ALT 40 12/27/2022    ALKPHOS 102 12/27/2022    PROTTOTAL 6.2 (L) 12/27/2022    ALBUMIN 4.0 12/27/2022       ASSESSMENT AND PLAN   Nik Nava is a 62 year old male with Ph Pos ALL, day 504 s/p sib allo stem cell transplant    Day -6 (8/4): flu/cy  Day -5 through day -2 (8/5-8/8): flu  Day -1 (8/9): TBI  Day 0 (8/10): transplant     1.  Acute lymphoblastic leukemia, [place] chromosome positive in CR, MRD neg. S/p allo sib PBSCT. (ABO matched)  HCT-CI score: 3 (prior solid tumor)  Day +100 bone marrow biopsy is 100% donor with no morphologic or flow cytometric evidence of leukemia BCR abl is pending.  - Day +180 restaging BM bx- still in CR  100% donor;  2/16/22 BCR ABL major breakpoint undetectable.   - 1 year restaging 8/18/2022: Markedly hypocellular bone marrow [less than 10% cellular] with maturing trilineage hematopoiesis and no definite morphologic or immunophenotypic evidence for involvement by B lymphoblastic leukemia. BCRABL1 PCR not detected, bone marrow % donor. FISH NORMAL No evidence of BCR-ABL1 fusion  - Maintenance with TKI posttransplantation given his Ph positive ALL that have not been started previously presumed secondary to multiple active issues specifically infections and COVID.  Per Avila Tobar: will reconsider this patient will think about whether this is something he would like to consider he was previously on Dasatinib, we would start on a low dose and increase as tolerated.  This is on  hold now pending active GVHD therapy, we discussed on this visit 10/18 in agreement with holding off.     2.  HEME:  - Pulmonary emboli: He developed a pulmonary emboli in the setting of receiving PEG asparaginase (ie provoked thrombus).  Xarelto complete.   -At 1 year anniversary visit completed screening for secondary iron overload, 8/2022 ferritin 1023, recheck on 10/18 increased to 2348, expect this is acute phase reactant with recent GVHD flare.    - 11/8 ferritin 2812, MRI completed 11/2022 confirming iron overload, therefore today 11/22 I discussed with the patient starting phlebotomies as I worry about his tolerance to iron chelators specifically regarding his renal function, will start phlebotomies 250 mL every 4 weeks as tolerated and plan to keep his hemoglobin more than 11-volume can be titrated as well as frequency depending on his tolerance and hemoglobin as well as follow-up ferritin levels.      3.  FEN/Renal:  - Cr improved with switching patient to sirolimus briefly, then with increased Cr on 10/11 with third spacing. Total protein to creatinine ratio 0.41 on 10/11 recheck 0.39 on 10/18. 11/8 creatinine up to 1.4, 11/22 creatinine down to 1.25   - He has a history of renal cancer and resection. Has a single kidney.  - 11/1/2022 seen by nephrology: multifactorial, focus on BP control, Started Chlorthalidone 12.5 mg daily and reduce amlodipine to 2.5 mg per day, Low salt diet and reduce ice tea. Next step: may increase chlorthalidone if Cr stable but if not improving in swelling, may start torsemide Follow-up in 2 month   -Hypomagnesemia,11/8 decrease magnesium to 2 tablets 2 times a day-seems to have triggered more muscle cramps specifically in his right arm cussed with the patient uptitrating that back up for symptom control of his cramps, he continues on vitamin E for muscle cramps as well.     4.    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   #Skin GVHD- history of  biopsy proven GVHD of the skin 8/30/2021; resolved.   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 WNL today 11/22  # Ocular GVHD: follows with ophthalmology. Maxitrol to lids, continue refreshing drops QID. Score 3  Followed closely by Dr. Juarez and had ocular fittings 11/8 however no lens seen on exam today he will follow-up with her team for refitting   # Oral GVHD: dex s/s three-4 times a day; tac ointment to lips as needed.  Clotrimazole cream added after seeing dermatology.  Lichenoid changes have resolved with no other ulcers since 11/8     Previous therapies  Tacrolimus-previously decided to stay on same dose, no checks of levels if clinically stable, and no need switch to sirolimus. (keep tac low as gvhd is quiet and viremia). 5/10 level 45 with starting of COVID therapy and D-D intractions (Paxlovid for COVID19, which has a drug interaction with tacrolimus-Ritonavir increases serum levels of tacrolimus).   Tacrolimus level subtherapeutic, increase to 2.5 mg twice daily recently, 8/23/2022 instructed to change tacrolimus to 2 mg twice daily. 9/6 with mild NEGRA, hyperkalemia, hypomagnesemia, and reports some tremors and cramps, suspect tacrolimus to be high therefore empirically decreased to 1.5 twice daily, skip morning dose of tacrolimus and took 2 mg today after his afternoon labs. Decrease to 1mg BID on Saturday.  Sirolimus  9/21 despite fluids and a dose adjustment of tacrolimus patient seem to have persistence NORBERTO related NEGRA, therefore after discussion with the patient decision was made to transition to sirolimus that was started on 9/21, patient initially seem to tolerate this well with normalization of kidney function  10/11 presented with flares of ocular and oral GVHD, fluid retention and gain of 5-6 kg, lower extremity edema, abdominal distention, total protein to creatinine ratio elevated at 0.4, in addition to elevation in BUN and creatinine, HTN.   Picture most consistent with sirolimus related side effects.  Less likely that the patient will tolerate even a lower level of sirolimus.  Therefore the patient was instructed to stop sirolimus, last dose on 10/10. 10/14 level 3.5 appropriately trending down.     Corticosteroids  3/1-3/16: prednisone 100mg every day  3/17 75mg every day   3/31 75 alt with 50  4/6: 75 alt with 25mg every other day  4/12 pred tapered to 60 alternating with 25mg   4/15 In setting of viruses trying to reactivate,GVHD stable: Taper 60/15mg alternating.  4/20 decrease pred further to 50/10.    4/25 taper pred to 50/0 every other day as long as symptoms stable.   5/2 Pred decrease 40/0 alternating every other day.   5/13 decrease to 30/0  5/20 decrease to 20/0 then slowly by 5 mg weekly  6/7 decrease to 10/0  Went up to 15/0 with mild increased LFTs  8/23/2022 increased to 20/0, with persistence of oral ulcers and active ocular GVHD.  Discussed with the patient the importance of stopping chewing of nicotine gums for prolonged hours as that would not help with the healing of the oral ulcers.  I discussed with the patient alternatives of nicotine patches that he will reconsider and let me know.  9/6 decreased to 15 alternating with 0  9/13 decreased to 10 alternating with 0  9/21 discontinued tacrolimus given persistent NEGRA and single kidney and start the patient on sirolimus without loading dose.  We will get a list of possible side effects and adverse events from the Pharm.D. see sirolimus section above.  10/11 GVHD flare. Sirolimus stopped. Resume prednisone 40 mg daily as of 10/12, no change with mildly worsening eye pain 10/14  Reduce pred to 35mg 10/25 and 25mg 11/1 11/8 20 mg   11/22 15 mg  12/13/22 10 mg (plan to taper to 5mg then slow taper 1 mg per month given long pred history)    Belumosudil  Patient intolerant/with disease flares on tacrolimus and sirolimus see above.  Discussed with the patient the different options for  therapy of chronic GVHD that are FDA approved.  Given the side effect profiles after discussing in detail and given the least nephrotoxicity and expected response, taking into account other metabolic effect as well.  We will proceed with prior authorization with belumosudil.  Patient was given material to review for possible expected adverse events and side effects with both Belumosodil and ruxolitinib.   --- Started on 10/18 in p.m. - feeling tired with malaise since starting and worried the drug is responsible     5.  ID: Afebrile with no current signs or symptoms of infection.  He recently was admitted following influenza and is now improved    # cat bite/scratch: doxy 100mg BID x 10 days; completes 11/3/22.  Bite has healed, no signs of infection.     #History of COVID   Positive via homed test 5/8. Treated with Paxlovid with resolution of symptoms.  Had recurrence on 5/18. Resolved  Covid 11/21, 12/21, received his influenza annual vaccine as well as COVID boosters locally 11/16     #History of pulmonary infiltrates:   4/20 CT chest with new RUL infiltrate. Highly immunocompromised. 4/22 Fungitell/asp GM were neg. BAL 4/29 NGTD, increased micafungin to 150mg IV daily-- f/u 6/1 Dr Gacria CT with mixed results, followed with Dr. Garcia August 2022 with resolution of pulmonary nodules and normalization of LFTs we will switch patient will switch patient to posaconazole prophylactic dose and monitor LFTs and any infectious concerns closely (wnl stable 10/14)     #History of CMV viremia:    - CMV viremia up to 1100. 4/15 started valcyte 900mg PO BID. 4/20 CMV <137, 5/10 ND, decreased to 450mg BID 4/30 - continue 4 weeks as long as CMV <137 till 5/28 + weekly CMV  - Switch to acyclovir after completion of current therapy 5/28. 10/11 CMV ND. Check monthly on IST, 11/8 CMV <137, 11/22 ND, recheck 12/8     - PPx:   ACV  Posaconazole (previously on micafungin with LFts abl, hx of pulmonary nodules needign treatment dose).    Pentamidine, last 11/22  Azithro 250mg daily (stopped levaquin due to possible tendonitis).      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 S/p 4/22 and 5/4/22 rituximab 375mg/m2 5/10 ND x2, 6/7 ND, 8/18/2022 971, 10/11 ND, check every other month on IST, 11/8 ND  S/p covid vaccine series 12/2021  S/p heathereld   Deferred annual vaccines in the setting of GVHD and immunosuppression therapy     6. Endo:   - Hx of graves disease; On synthroid 175 mcg daily. TSH normal 4/6/2022 no reflex to T4.  Recheck on 10/18 T3 low at 0.03, free T3 and T4 within normal.  Subclinical hyperthyroidism, 11/22/222 and discussion with endocrine, reduce the levothyroxine to 150 mcg/day and repeat labs in 2-3.  He has follow-up appointment with endocrine    - HLD: 11/2022 cholesterol 355, triglycerides 153, -- 11/8 started rosuvastatin 5 mg daily we will recheck lipid profile in 3 months, 11/22 CPK within normal     7. CV:   - Hypertension history - now on low side after weight loss  - TTE most recently 10/2022     8. GI:   -Omeprazole for heartburn  -LFTs as above, now normal. 9/6 decreased ursodiol to daily      9. Psych:   - Situational anxiety - lexapro 10mg daily.   - Insomnia: worse on steroids. Ativan, trazadone, melatonin     10. MSK: left food drop, PT. Occasional muscle cramps discussed optimizing vitamin D levels and considering vitamin D, some of the symptomatology of muscle cramps are likely related to chronic GVHD.     11. HCM/Age appropriate cancer screening:  -Discussed with the patient importance of age-appropriate cancer screening including colonoscopies, he is due for this.  -Discussed importance of at least once a year vitamin D level check, 8/18/2022 wnl  -Screening for secondary iron overload, see heme section above, start vitamins on 12/13  -Yearly TSH 2022 wnl; yearly lipid panel - see GI section above  -9/6/2022 DEXA scan Based on BMD diagnosis is consistent with normal bone density based on  WHO criteria Ref. 1   -PFTs 9/20/22, see above  -Metabolic other, 8/2022 testosterone within normal  -11/22/2022 discussed with the patient the challenges and the stresses of chronic illness and healthcare fatigue.  Patient had previously been on Lexapro that we restarted today confirmed with pharmacy that there are no drug drug interactions.  Additionally we will refer patient to PMNR to help with physical rehab and strength to help with fatigue.  Our social workers will also reach out to Nik and his wife for further resources including support groups for patients with chronic illness and fatigue post transplant.       Today's Summary:  Cont 10 mg until follow up with MD  Na is improved - continue to hydrate and salt food  BP and weight are stable     Follow up with nephrology on 1/10  Follow up with Dr. Grant on 1/12      I spent 30 minutes in the care of this patient today, which included time necessary for preparation for the visit, obtaining history, ordering medications/tests/procedures as medically indicated, review of pertinent medical literature, counseling of the patient, communication of recommendations to the care team, and documentation time.    Moris Ortiz PA-C   *[phone]          Again, thank you for allowing me to participate in the care of your patient.      Sincerely,    BMT Advanced Practice Provider

## 2022-12-28 LAB — IGG SERPL-MCNC: 342 MG/DL (ref 610–1616)

## 2022-12-28 NOTE — PROGRESS NOTES
This is a recent snapshot of the patient's [facility] medical record.  For current drug dose and complete information and questions, call [phone] or In Basket pool, [facility] ([id number])  CSN Number:  [id number]

## 2023-01-01 NOTE — TELEPHONE ENCOUNTER
"[facility] Call Center    Phone Message    May a detailed message be left on voicemail: yes     Reason for Call: Appointment Intake    Referring Provider Name: Akshat Bearden MD  Diagnosis and/or Symptoms: \"Kidney dz; pmh pof renal ca with nephrectomy; s/p allo BMT. Need reqs and managemtn\"    Patient is being referred for the above. Appt needed in 1-2 weeks. Writer unable to schedule within requested timeframe. Sending encounter per guidelines. Please review and follow-up with patient for scheduling.       Action Taken: Message routed to:  [facility] Nephrology    Travel Screening: Not Applicable                                                                      " Statement Selected

## 2023-01-08 ENCOUNTER — HEALTH MAINTENANCE LETTER (OUTPATIENT)
Age: 63
End: 2023-01-08

## 2023-01-09 RX ORDER — LORAZEPAM 1 MG/1
1 TABLET ORAL
Qty: 30 TABLET | Refills: 3 | Status: SHIPPED | OUTPATIENT
Start: 2023-01-09 | End: 2023-11-14

## 2023-01-10 ENCOUNTER — LAB (OUTPATIENT)
Dept: LAB | Facility: CLINIC | Age: 63
End: 2023-01-10
Payer: COMMERCIAL

## 2023-01-10 ENCOUNTER — OFFICE VISIT (OUTPATIENT)
Dept: NEPHROLOGY | Facility: CLINIC | Age: 63
End: 2023-01-10
Attending: INTERNAL MEDICINE
Payer: COMMERCIAL

## 2023-01-10 VITALS
TEMPERATURE: 98 F | WEIGHT: 250.2 LBS | DIASTOLIC BLOOD PRESSURE: 73 MMHG | BODY MASS INDEX: 33.02 KG/M2 | HEART RATE: 77 BPM | OXYGEN SATURATION: 96 % | SYSTOLIC BLOOD PRESSURE: 125 MMHG

## 2023-01-10 DIAGNOSIS — N17.9 AKI (ACUTE KIDNEY INJURY) (H): Primary | ICD-10-CM

## 2023-01-10 DIAGNOSIS — E03.9 HYPOTHYROIDISM: ICD-10-CM

## 2023-01-10 DIAGNOSIS — N17.9 AKI (ACUTE KIDNEY INJURY) (H): ICD-10-CM

## 2023-01-10 LAB
ALBUMIN MFR UR ELPH: 58 MG/DL (ref 1–14)
ALBUMIN SERPL BCG-MCNC: 4.2 G/DL (ref 3.5–5.2)
ALBUMIN UR-MCNC: 30 MG/DL
ANION GAP SERPL CALCULATED.3IONS-SCNC: 8 MMOL/L (ref 7–15)
APPEARANCE UR: CLEAR
BASOPHILS # BLD AUTO: 0 10E3/UL (ref 0–0.2)
BASOPHILS NFR BLD AUTO: 0 %
BILIRUB UR QL STRIP: NEGATIVE
BUN SERPL-MCNC: 34.6 MG/DL (ref 8–23)
CALCIUM SERPL-MCNC: 10.1 MG/DL (ref 8.8–10.2)
CHLORIDE SERPL-SCNC: 104 MMOL/L (ref 98–107)
COLOR UR AUTO: ABNORMAL
CREAT SERPL-MCNC: 1.11 MG/DL (ref 0.67–1.17)
CREAT UR-MCNC: 164 MG/DL
DEPRECATED HCO3 PLAS-SCNC: 28 MMOL/L (ref 22–29)
EOSINOPHIL # BLD AUTO: 0 10E3/UL (ref 0–0.7)
EOSINOPHIL NFR BLD AUTO: 0 %
ERYTHROCYTE [DISTWIDTH] IN BLOOD BY AUTOMATED COUNT: 17.2 % (ref 10–15)
GFR SERPL CREATININE-BSD FRML MDRD: 74 ML/MIN/1.73M2
GLUCOSE SERPL-MCNC: 139 MG/DL (ref 70–99)
GLUCOSE UR STRIP-MCNC: NEGATIVE MG/DL
HCT VFR BLD AUTO: 33.8 % (ref 40–53)
HGB BLD-MCNC: 11.3 G/DL (ref 13.3–17.7)
HGB UR QL STRIP: NEGATIVE
HYALINE CASTS: 1 /LPF
IMM GRANULOCYTES # BLD: 0.1 10E3/UL
IMM GRANULOCYTES NFR BLD: 1 %
KETONES UR STRIP-MCNC: NEGATIVE MG/DL
LEUKOCYTE ESTERASE UR QL STRIP: NEGATIVE
LYMPHOCYTES # BLD AUTO: 3 10E3/UL (ref 0.8–5.3)
LYMPHOCYTES NFR BLD AUTO: 33 %
MCH RBC QN AUTO: 35.4 PG (ref 26.5–33)
MCHC RBC AUTO-ENTMCNC: 33.4 G/DL (ref 31.5–36.5)
MCV RBC AUTO: 106 FL (ref 78–100)
MONOCYTES # BLD AUTO: 0.7 10E3/UL (ref 0–1.3)
MONOCYTES NFR BLD AUTO: 7 %
MUCOUS THREADS #/AREA URNS LPF: PRESENT /LPF
NEUTROPHILS # BLD AUTO: 5.2 10E3/UL (ref 1.6–8.3)
NEUTROPHILS NFR BLD AUTO: 59 %
NITRATE UR QL: NEGATIVE
NRBC # BLD AUTO: 0.1 10E3/UL
NRBC BLD AUTO-RTO: 1 /100
PH UR STRIP: 6.5 [PH] (ref 5–7)
PHOSPHATE SERPL-MCNC: 2.3 MG/DL (ref 2.5–4.5)
PLATELET # BLD AUTO: 140 10E3/UL (ref 150–450)
POTASSIUM SERPL-SCNC: 4.8 MMOL/L (ref 3.4–5.3)
PROT/CREAT 24H UR: 0.35 MG/MG CR (ref 0–0.2)
RBC # BLD AUTO: 3.19 10E6/UL (ref 4.4–5.9)
RBC URINE: 1 /HPF
SODIUM SERPL-SCNC: 140 MMOL/L (ref 136–145)
SP GR UR STRIP: 1.01 (ref 1–1.03)
T3 SERPL-MCNC: 88 NG/DL (ref 85–202)
T4 FREE SERPL-MCNC: 1.67 NG/DL (ref 0.9–1.7)
TSH SERPL DL<=0.005 MIU/L-ACNC: 0.04 UIU/ML (ref 0.3–4.2)
UROBILINOGEN UR STRIP-MCNC: NORMAL MG/DL
WBC # BLD AUTO: 8.9 10E3/UL (ref 4–11)
WBC URINE: 1 /HPF

## 2023-01-10 PROCEDURE — 84156 ASSAY OF PROTEIN URINE: CPT | Performed by: PATHOLOGY

## 2023-01-10 PROCEDURE — 84443 ASSAY THYROID STIM HORMONE: CPT | Performed by: PATHOLOGY

## 2023-01-10 PROCEDURE — 80069 RENAL FUNCTION PANEL: CPT | Performed by: PATHOLOGY

## 2023-01-10 PROCEDURE — 85025 COMPLETE CBC W/AUTO DIFF WBC: CPT | Performed by: PATHOLOGY

## 2023-01-10 PROCEDURE — 99215 OFFICE O/P EST HI 40 MIN: CPT | Performed by: INTERNAL MEDICINE

## 2023-01-10 PROCEDURE — 84480 ASSAY TRIIODOTHYRONINE (T3): CPT | Performed by: INTERNAL MEDICINE

## 2023-01-10 PROCEDURE — 36415 COLL VENOUS BLD VENIPUNCTURE: CPT | Performed by: PATHOLOGY

## 2023-01-10 PROCEDURE — 84439 ASSAY OF FREE THYROXINE: CPT | Performed by: PATHOLOGY

## 2023-01-10 PROCEDURE — 99213 OFFICE O/P EST LOW 20 MIN: CPT | Performed by: INTERNAL MEDICINE

## 2023-01-10 PROCEDURE — 81001 URINALYSIS AUTO W/SCOPE: CPT | Performed by: PATHOLOGY

## 2023-01-10 ASSESSMENT — PAIN SCALES - GENERAL: PAINLEVEL: NO PAIN (0)

## 2023-01-10 NOTE — PROGRESS NOTES
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     Oncologic history: The patient was diagnosed with Ph+ ALL in 2020 after presented with feeling unwell and found to have leukocytosis and blast in his blood. He was treated with Dex and Dasatinib then 2 cycles of vincristine, prednisone, daunorubicin, and pegasparaginase with intrathecal methotrexate. Last ly, he received 1 course of high dose Jaymie-C. He achieved CR with no MRD. Subsequently, he underwent allo sib NMA (flu/cy+TBI) PBSCT on  8/10/21. hematocrit-CI was 3.  The patient was noted to be in CR with BmBx at D100, D180 and 1Y showing 100% donor. He has not been started on TKI due to previous multiple active issues. Post Tx complication included cGVHD at skin, eyes, o liver (all Bx proven, except eyes, clinically) started since 2/22. The current active cGVHDD involved eyes and skin. He was on Tacrolimus for cGVHD but later discontinued due to NEGRA, hyperkalemia hypomagnesemia, tremors and cramps in 9/22. Sirolimus was started in 9/21/22 in replacement of Tac. Cr improved, but the presented on 10/11/22 with ocular and cGVHD flare, fluid retention and gain 5-6 kg. BMT thought this is sirolimus Tox? (of note UPCR was only 0.4 and normal SAlb). Sirolimus was then stopped. Prednisone was started at 40 mg with slow tapering. He was also started on Belumosudil on 10/18/22 (ROCK2 inhibitors:Rho-associated coiled-coil kinase )  Other pertinent Hx:   - He had PE in the setting of receiving PEG asparaginase and was treated with Xarelto which is now completed.   - He had hyperferritinemia which now being followed closely. Not on iron chelators.  - He had COVID -19 in 5/22.  - CMV viremia  - Grave disease  -  HTN  Kidney history: He had RCC s/p Rt nephrectomy in 2007.  The patient has baseline creatinine of 0.9-1 pretransplant.  He developed NEGRA in 9/21 with Cr peaked at 1.89 but then come down to baseline. He had NEGRA again in 9/22 with Cr peaked at 1.8, suspect that due to tacrolimus toxicity and it was stopped. Cr improved but now still fluctuates between 1.1-1.3.  The most recent creatinine was on 10/31/2022 at 1.08.  Serum albumin is 3.3. UCPR is 0.39 g/g on 10/18/22. UA on 9/12/21 and 10/18/22 showed no protein, no blood.  However UA on 9/12/2021 showed 8 hyaline cast.      11/1/22: He is doing well except that he still has LE edema. It has improved from when he was on Sirolimus but still quite a bit. Edema has actually started since 2021 but unclear when.But it is certainly getting worse lately when he was treated with high dose steroid and sirolimus.His cGVHD is o/eva all stable but not completely gone. He was just started Belumosudil on 10/18/22 for cGVHD. He just saw BMT today and they are decreasing his steroid.Amlodipine started since 8/21 and possibly related to his swelling. He has multiple SEs from steroid such as weight gain , central obesity, easily bruised, increased appetite and partial insomnia. He has some nocturia. He has no dysuria or hematuria. Recently, he had cat scrath incidence and being treated with doxycycline. He has abdominal distension and gained weight progressively. He drinks 120 Oz of tea per day. We started chlorthalidone and reduced amlodipine.   12/1-12/3/22: Was admitted for dyspnea and malaise concerning for belumosudil intolerance. However, at the same time he also had flu A positive. He was taken off of amlodipine on 12/6/22. And subsequently chlorthalidone was off in 12/13/22 when Na was 128. Na improved afterwards.   1/10/23:  He feels good today. His BP are now very good 110-130/70 mmHg. He is not on any BP medication ayt this time. He is taking Belumosidil. Symptoms of cGVHD  have been improving.  He has some LE edema and easily bruised likely from steroid/belumosudil.   Labs: Creatinine 1.11, potassium 4.8,, dioxide 28, phosphorus 2.3, albumin 4.2, calcium 10.1.  Hemoglobin 11.3, platelet 140. UA pending.     Past medical history  Past Medical History:   Diagnosis Date     ALL (acute lymphocytic leukemia) (H)      Cancer (H)     renal cell     CKD (chronic kidney disease)      H/O peripheral stem cell transplant (H)      Thyroid disease        Past surgical history  Past Surgical History:   Procedure Laterality Date     BACK SURGERY  2017     BONE MARROW BIOPSY, BONE SPECIMEN, NEEDLE/TROCAR Left 9/2/2021    Procedure: BIOPSY, BONE MARROW;  Surgeon: Jailyn Ny APRN CNP;  Location: [facility] OR     BONE MARROW BIOPSY, BONE SPECIMEN, NEEDLE/TROCAR Left 11/15/2021    Procedure: BIOPSY, BONE MARROW;  Surgeon: Socrates De La Torre;  Location: [facility] OR     BONE MARROW BIOPSY, BONE SPECIMEN, NEEDLE/TROCAR Left 2/7/2022    Procedure: BIOPSY, BONE MARROW;  Surgeon: Renetta Wiggins PA-C;  Location: [facility] OR     BONE MARROW BIOPSY, BONE SPECIMEN, NEEDLE/TROCAR Left 8/18/2022    Procedure: BIOPSY, BONE MARROW;  Surgeon: Lorrie Yap PA-C;  [place] [facility]     BRONCHOSCOPY (RIGID OR FLEXIBLE), DIAGNOSTIC N/A 4/29/2022    Procedure: BRONCHOSCOPY, WITH BRONCHOALVEOLAR LAVAGE;  Surgeon: Murtaza Garcia MD;  Location: [facility]     IR CVC TUNNEL PLACEMENT > 5 YRS OF AGE  8/4/2021     IR CVC TUNNEL REMOVAL LEFT  9/2/2021     IR LIVER BIOPSY PERCUTANEOUS  2/21/2022     PERCUTANEOUS BIOPSY LIVER N/A 2/21/2022    Procedure: NEEDLE BIOPSY, LIVER, PERCUTANEOUS;  Surgeon: Trace Castellanos MD;  [place] [facility] [place]         Review of Systems:   14 systems were reviewed and all negative except as mentioned above.   Current Medications:  Current Outpatient Medications   Medication     acyclovir (ZOVIRAX) 800 MG tablet     amLODIPine (NORVASC) 2.5 MG tablet     azithromycin (ZITHROMAX) 250 MG tablet      Belumosudil Mesylate 200 MG TABS     Carboxymethylcell-Glycerin PF (REFRESH RELIEVA PF) 0.5-1 % SOLN     carboxymethylcellulose PF (CARBOXYMETHYLCELLULOSE SODIUM) 0.5 % ophthalmic solution     chlorthalidone (HYGROTON) 25 MG tablet     clobetasol (TEMOVATE) 0.05 % external ointment     dexamethasone alcohol-free (DECADRON) 0.1 MG/ML solution     erythromycin (ROMYCIN) 5 MG/GM ophthalmic ointment     escitalopram (LEXAPRO) 10 MG tablet     fluorometholone (FML LIQUIFILM) 0.1 % ophthalmic suspension     levothyroxine (SYNTHROID/LEVOTHROID) 150 MCG tablet     LORazepam (ATIVAN) 1 MG tablet     magnesium oxide (MAG-OX) 400 MG tablet     nicotine polacrilex (NICORETTE) 4 MG gum     omeprazole (PRILOSEC) 40 MG DR capsule     posaconazole (NOXAFIL) 100 MG EC tablet     predniSONE (DELTASONE) 10 MG tablet     predniSONE (DELTASONE) 5 MG tablet     rosuvastatin (CRESTOR) 5 MG tablet     sennosides (SENOKOT) 8.6 MG tablet     sodium chloride 0.9 % neb solution     traZODone (DESYREL) 50 MG tablet     No current facility-administered medications for this visit.       Physical Exam:   There were no vitals taken for this visit.   There is no height or weight on file to calculate BMI.    GENERAL APPEARANCE: Alert, not in acute distress; pleasant.   EYES:  Not pale conjunctiva, pupils equal  HENT: Mouth without ulcers or lesions  PULM: lungs clear to auscultation bilaterally, equal air movement, no clubbing  CV: regular rhythm, normal rate, no rub     -JVD no distended.      -edema: trace edema  GI: soft,  - tender, no distended, bowel sounds are present  INTEGUMENT: No rash  NEURO:  Non focal. No asterixis.     Labs:   All labs reviewed by me  Last Renal Panel:  Sodium   Date Value Ref Range Status   12/27/2022 137 136 - 145 mmol/L Final   07/08/2021 139 133 - 144 mmol/L Final     Potassium   Date Value Ref Range Status   12/27/2022 4.4 3.4 - 5.3 mmol/L Final   11/15/2022 5.0 3.4 - 5.3 mmol/L Final   07/08/2021 3.9 3.4 - 5.3  mmol/L Final     Potassium POCT   Date Value Ref Range Status   12/01/2022 4.8 3.4 - 5.3 mmol/L Final     Chloride   Date Value Ref Range Status   12/27/2022 101 98 - 107 mmol/L Final   11/15/2022 101 94 - 109 mmol/L Final   07/08/2021 108 94 - 109 mmol/L Final     Carbon Dioxide   Date Value Ref Range Status   07/08/2021 23 20 - 32 mmol/L Final     Carbon Dioxide (CO2)   Date Value Ref Range Status   12/27/2022 25 22 - 29 mmol/L Final   11/15/2022 30 20 - 32 mmol/L Final     Anion Gap   Date Value Ref Range Status   12/27/2022 11 7 - 15 mmol/L Final   11/15/2022 3 3 - 14 mmol/L Final   07/08/2021 7 3 - 14 mmol/L Final     Glucose   Date Value Ref Range Status   12/27/2022 130 (H) 70 - 99 mg/dL Final   11/15/2022 113 (H) 70 - 99 mg/dL Final   07/08/2021 107 (H) 70 - 99 mg/dL Final     GLUCOSE BY METER POCT   Date Value Ref Range Status   12/03/2022 116 (H) 70 - 99 mg/dL Final     Urea Nitrogen   Date Value Ref Range Status   12/27/2022 40.0 (H) 8.0 - 23.0 mg/dL Final   11/15/2022 51 (H) 7 - 30 mg/dL Final   07/08/2021 24 7 - 30 mg/dL Final     Creatinine   Date Value Ref Range Status   12/27/2022 1.35 (H) 0.67 - 1.17 mg/dL Final   07/08/2021 0.94 0.66 - 1.25 mg/dL Final     GFR Estimate   Date Value Ref Range Status   12/27/2022 59 (L) >60 mL/min/1.73m2 Final     Comment:     Effective December 21, 2021 eGFRcr in adults is calculated using the 2021 CKD-EPI creatinine equation which includes age and gender (Omar byers al., NEJM, DOI: 10.1056/KJFIkz7193449)   07/08/2021 86 >60 mL/min/[1.73_m2] Final     Comment:     Non  GFR Calc  Starting 12/18/2018, serum creatinine based estimated GFR (eGFR) will be   calculated using the Chronic Kidney Disease Epidemiology Collaboration   (CKD-EPI) equation.       Calcium   Date Value Ref Range Status   12/27/2022 10.1 8.8 - 10.2 mg/dL Final   07/08/2021 8.9 8.5 - 10.1 mg/dL Final     Phosphorus   Date Value Ref Range Status   12/01/2022 3.1 2.5 - 4.5 mg/dL Final      Albumin   Date Value Ref Range Status   12/27/2022 4.0 3.5 - 5.2 g/dL Final   11/15/2022 3.6 3.4 - 5.0 g/dL Final   07/08/2021 3.7 3.4 - 5.0 g/dL Final       Imaging:  I reviewed imaging studies.     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 However, he had worsening swelling and increased in Cr to 1.39. At that time, it was concerned that he may have sirolimus toxicity. But he only had UCPR 0.39 g/g at that time. At that time, I started low dose chlorthalidone and reduced dose amlodipine. His swelling improved but he developed hypotension so now he is not on any BP medications. His Cr also improved to 1.1 which is closer to his baseline. UA is pending.     At this time, we will continue to watch his kidney functions and blood pressure. Discussed with him and his wife about ways to keep kidney healthy.   - Continue to hold blood pressure medication and monitor blood pressure 2-3 times per week and weight daily  - Low salt diet; NA 2000 mg per day  - Follow-up in 4 months with labs     Follow-up in 4 months with labs    I spent  45 minutes on the date of the encounter doing chart review, history and exam, documentation and further activities as noted above. 20 minutes of this visit is dedicated to direct patient interaction via face to face.     Keegan Chew MD on 01/10/2023

## 2023-01-10 NOTE — PATIENT INSTRUCTIONS
Thank you for coming in today  Recommend checking blood pressure 2-3 per week  See you in 4 months

## 2023-01-12 ENCOUNTER — APPOINTMENT (OUTPATIENT)
Dept: LAB | Facility: CLINIC | Age: 63
End: 2023-01-12
Attending: INTERNAL MEDICINE
Payer: COMMERCIAL

## 2023-01-12 ENCOUNTER — ONCOLOGY VISIT (OUTPATIENT)
Dept: TRANSPLANT | Facility: CLINIC | Age: 63
End: 2023-01-12
Attending: INTERNAL MEDICINE
Payer: COMMERCIAL

## 2023-01-12 VITALS
BODY MASS INDEX: 33.12 KG/M2 | WEIGHT: 251 LBS | TEMPERATURE: 98 F | OXYGEN SATURATION: 98 % | HEART RATE: 71 BPM | DIASTOLIC BLOOD PRESSURE: 82 MMHG | SYSTOLIC BLOOD PRESSURE: 141 MMHG | RESPIRATION RATE: 16 BRPM

## 2023-01-12 DIAGNOSIS — Z94.81 S/P BONE MARROW TRANSPLANT (H): ICD-10-CM

## 2023-01-12 LAB
ALBUMIN SERPL BCG-MCNC: 4 G/DL (ref 3.5–5.2)
ALP SERPL-CCNC: 103 U/L (ref 40–129)
ALT SERPL W P-5'-P-CCNC: 33 U/L (ref 10–50)
ANION GAP SERPL CALCULATED.3IONS-SCNC: 9 MMOL/L (ref 7–15)
AST SERPL W P-5'-P-CCNC: 36 U/L (ref 10–50)
BASOPHILS # BLD AUTO: 0 10E3/UL (ref 0–0.2)
BASOPHILS NFR BLD AUTO: 0 %
BILIRUB SERPL-MCNC: 0.8 MG/DL
BUN SERPL-MCNC: 39.5 MG/DL (ref 8–23)
CALCIUM SERPL-MCNC: 9.8 MG/DL (ref 8.8–10.2)
CHLORIDE SERPL-SCNC: 104 MMOL/L (ref 98–107)
CREAT SERPL-MCNC: 1.05 MG/DL (ref 0.67–1.17)
DEPRECATED HCO3 PLAS-SCNC: 26 MMOL/L (ref 22–29)
EOSINOPHIL # BLD AUTO: 0 10E3/UL (ref 0–0.7)
EOSINOPHIL NFR BLD AUTO: 0 %
ERYTHROCYTE [DISTWIDTH] IN BLOOD BY AUTOMATED COUNT: 17.2 % (ref 10–15)
GFR SERPL CREATININE-BSD FRML MDRD: 79 ML/MIN/1.73M2
GLUCOSE SERPL-MCNC: 88 MG/DL (ref 70–99)
HCT VFR BLD AUTO: 32.9 % (ref 40–53)
HGB BLD-MCNC: 11 G/DL (ref 13.3–17.7)
IMM GRANULOCYTES # BLD: 0.1 10E3/UL
IMM GRANULOCYTES NFR BLD: 1 %
LYMPHOCYTES # BLD AUTO: 4.1 10E3/UL (ref 0.8–5.3)
LYMPHOCYTES NFR BLD AUTO: 44 %
MAGNESIUM SERPL-MCNC: 1.9 MG/DL (ref 1.7–2.3)
MCH RBC QN AUTO: 35.1 PG (ref 26.5–33)
MCHC RBC AUTO-ENTMCNC: 33.4 G/DL (ref 31.5–36.5)
MCV RBC AUTO: 105 FL (ref 78–100)
MONOCYTES # BLD AUTO: 0.9 10E3/UL (ref 0–1.3)
MONOCYTES NFR BLD AUTO: 9 %
NEUTROPHILS # BLD AUTO: 4.3 10E3/UL (ref 1.6–8.3)
NEUTROPHILS NFR BLD AUTO: 46 %
NRBC # BLD AUTO: 0.1 10E3/UL
NRBC BLD AUTO-RTO: 1 /100
PLATELET # BLD AUTO: 156 10E3/UL (ref 150–450)
POTASSIUM SERPL-SCNC: 5 MMOL/L (ref 3.4–5.3)
PROT SERPL-MCNC: 6.2 G/DL (ref 6.4–8.3)
RBC # BLD AUTO: 3.13 10E6/UL (ref 4.4–5.9)
SODIUM SERPL-SCNC: 139 MMOL/L (ref 136–145)
WBC # BLD AUTO: 9.3 10E3/UL (ref 4–11)

## 2023-01-12 PROCEDURE — 83735 ASSAY OF MAGNESIUM: CPT | Performed by: INTERNAL MEDICINE

## 2023-01-12 PROCEDURE — 85025 COMPLETE CBC W/AUTO DIFF WBC: CPT | Performed by: INTERNAL MEDICINE

## 2023-01-12 PROCEDURE — 250N000011 HC RX IP 250 OP 636: Performed by: INTERNAL MEDICINE

## 2023-01-12 PROCEDURE — 36591 DRAW BLOOD OFF VENOUS DEVICE: CPT | Performed by: INTERNAL MEDICINE

## 2023-01-12 PROCEDURE — 99212 OFFICE O/P EST SF 10 MIN: CPT | Performed by: INTERNAL MEDICINE

## 2023-01-12 PROCEDURE — 99214 OFFICE O/P EST MOD 30 MIN: CPT | Performed by: INTERNAL MEDICINE

## 2023-01-12 PROCEDURE — 82784 ASSAY IGA/IGD/IGG/IGM EACH: CPT | Performed by: INTERNAL MEDICINE

## 2023-01-12 PROCEDURE — 80053 COMPREHEN METABOLIC PANEL: CPT | Performed by: INTERNAL MEDICINE

## 2023-01-12 RX ORDER — HEPARIN SODIUM (PORCINE) LOCK FLUSH IV SOLN 100 UNIT/ML 100 UNIT/ML
5 SOLUTION INTRAVENOUS ONCE
Status: COMPLETED | OUTPATIENT
Start: 2023-01-12 | End: 2023-01-12

## 2023-01-12 RX ADMIN — Medication 5 ML: at 16:33

## 2023-01-12 ASSESSMENT — PAIN SCALES - GENERAL: PAINLEVEL: NO PAIN (0)

## 2023-01-12 NOTE — PROGRESS NOTES
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    HPI:      He has been happy about his overall sense of wellbeing and feels like all in all there is a trend of improvement.  He said no new signs of GVH.  His most notable site of chronic GVHD remains his eyes.  He does see an ophthalmologist periodically and continues to drops.  His mouth is intermittently sensitive to certain foods but is not bothersome otherwise.  He continues to use the dexamethasone although intermittently.  He is feeling improved since decreasing the prednisone to 5 mg/day and his renal function has also improved.  He has slight edema in both legs and is not taking blood pressure medications or diuretics.    Review of Systems                                                                                                                                         10 point Review of systems was negative except as detailed above     PHYSICAL EXAM                                                                                                                                                   KPS: 80    BP (!) 141/82   Pulse 71   Temp 98  F (36.7  C)   Resp 16   Wt 113.9 kg (251 lb)   SpO2 98%   BMI 33.12 kg/m      104/66 is standing BP.  Sitting BP is 122/77    Wt Readings from Last 4 Encounters:   01/12/23 113.9 kg (251 lb)   01/10/23 113.5 kg (250 lb 3.2 oz)   12/27/22 111.7 kg (246 lb 4.8 oz)   12/13/22 111.1 kg (245 lb)      General: NAD.  HEENT: sclera anicteric, injected conjunctivae. No lichenoid changes in oral mucosa, no ulcers seen.    Ext/ Skin: no rash.  LE 1+ bilateral edema in lower extremities; wearing compression socks-improved  Access: port-a-cath    cGVHD therapy started on February 24 2022  - Baseline NIH scoring 2/24/2022 : skin 2 maculopapular rash/erythema with no sclerotic features, mouth score 1 mild symptoms with  lichenoid changes less than 25%, eyes score 2 moderate symptoms without new visual impairments due to KCS requiring lubricant eyedrops more than 3 times a day, overall mild scale for her provider  - 3/25/2022 1 month NIH treatment assessment on PQRST: skin 2, mouth score 1 mild symptoms with NO lichenoid changes, eyes score 2 moderate symptoms w requiring lubricant eyedrops more than 3 times a day, liver score 1 ALT 3.7x ULN and nl bilirubin   - 3/31  Mouth clear; eyes minimal LFT still abn; no joint or new GI sx  - 4/28 Mouth clear, Left>R eye is mild sclera erythema, lids are pink and irritated appearing, LFT's slow improvement, no new joint, skin, or GI symptoms.   -5/11 mouth with mild erythema but no lichenoid changes, eyes using eyedrops 4-6 times a day score of 2, LFTs significantly improved from baseline slight elevation in alk phos and AST with normal bilirubin, skin with hyperpigmentation from old acute GVHD no active skin rashes, no GI symptoms no musculoskeletal complaints.  -5/26 Mouth with very slight lichenoid changes, erythema.  Eyes still using eyedrops 4-6x per day.  LFTs essentially normal with slight elevation in alk phos.  Skin with hyperpigmentation from old acute GVHD, no active rashes, no GI or MSK concerns.  Skin 0, mouth 0 but with lichenoid changes, eyes 2  -6/7/22 Mouth with no lichenoid changes, erythema.  Eyes still using eyedrops 4-6x per day.  LFTs essentially normal with slight elevation in alk phos.  Skin with hyperpigmentation from old acute GVHD, no active rashes, no GI or MSK concerns.  Skin 0, mouth 0, eyes 2     -6 month PQRST assessment 9/6/2022: eyes 3, mouth 0 for symptoms, 25% lichenoid changes (1), liver/GI/skin 0    11/8/2022, 11/22/2022, 12/13/22 cGVHD NIG scoring eyes 3, no other organ involvement clinically    Lab:    Lab Results   Component Value Date    WBC 9.3 01/12/2023    ANEU 3.5 10/26/2021    HGB 11.0 (L) 01/12/2023    HCT 32.9 (L) 01/12/2023      01/12/2023     01/12/2023    POTASSIUM 5.0 01/12/2023    CHLORIDE 104 01/12/2023    CO2 26 01/12/2023    GLC 88 01/12/2023    BUN 39.5 (H) 01/12/2023    CR 1.05 01/12/2023    MAG 1.9 01/12/2023    INR 0.90 12/01/2022    BILITOTAL 0.8 01/12/2023    AST 36 01/12/2023    ALT 33 01/12/2023    ALKPHOS 103 01/12/2023    PROTTOTAL 6.2 (L) 01/12/2023    ALBUMIN 4.0 01/12/2023       ASSESSMENT AND PLAN   Nik Nava is a 62 year old male with Ph Pos ALL, day 520 s/p sib allo stem cell transplant    Day -6 (8/4): flu/cy  Day -5 through day -2 (8/5-8/8): flu  Day -1 (8/9): TBI  Day 0 (8/10): transplant     1.  Acute lymphoblastic leukemia, [place] chromosome positive in CR, MRD neg. S/p allo sib PBSCT. (ABO matched)  HCT-CI score: 3 (prior solid tumor)  Day +100 bone marrow biopsy is 100% donor with no morphologic or flow cytometric evidence of leukemia BCR abl is pending.  - Day +180 restaging BM bx- still in CR  100% donor;  2/16/22 BCR ABL major breakpoint undetectable.   - 1 year restaging 8/18/2022: Markedly hypocellular bone marrow [less than 10% cellular] with maturing trilineage hematopoiesis and no definite morphologic or immunophenotypic evidence for involvement by B lymphoblastic leukemia. BCRABL1 PCR not detected, bone marrow % donor. FISH NORMAL No evidence of BCR-ABL1 fusion  - Maintenance with TKI posttransplantation given his Ph positive ALL that have not been started previously presumed secondary to multiple active issues specifically infections and COVID.  Per Avila Tobar: will reconsider this patient will think about whether this is something he would like to consider he was previously on Dasatinib, we would start on a low dose and increase as tolerated.  This is on hold now pending active GVHD therapy, we discussed on this visit 10/18 in agreement with holding off.     2.  HEME:  - Pulmonary emboli: He developed a pulmonary emboli in the setting of receiving PEG asparaginase (ie provoked  thrombus).  Xarelto complete.   -At 1 year anniversary visit completed screening for secondary iron overload, 8/2022 ferritin 1023, recheck on 10/18 increased to 2348, expect this is acute phase reactant with recent GVHD flare.    - 11/8 ferritin 2812, MRI completed 11/2022 confirming iron overload, therefore today 11/22 I discussed with the patient starting phlebotomies as I worry about his tolerance to iron chelators specifically regarding his renal function, will start phlebotomies 250 mL every 4 weeks as tolerated and plan to keep his hemoglobin more than 11-volume can be titrated as well as frequency depending on his tolerance and hemoglobin as well as follow-up ferritin levels.      3.  FEN/Renal:  - Cr improved with switching patient to sirolimus briefly, then with increased Cr on 10/11 with third spacing. Total protein to creatinine ratio 0.41 on 10/11 recheck 0.39 on 10/18. 11/8 creatinine up to 1.4, 11/22 creatinine down to 1.25   - He has a history of renal cancer and resection. Has a single kidney.  - 11/1/2022 seen by nephrology: multifactorial, focus on BP control, Started Chlorthalidone 12.5 mg daily and reduce amlodipine to 2.5 mg per day, Low salt diet and reduce ice tea. Next step: may increase chlorthalidone if Cr stable but if not improving in swelling, may start torsemide Follow-up in 2 month   -Hypomagnesemia,11/8 decrease magnesium to 2 tablets 2 times a day-seems to have triggered more muscle cramps specifically in his right arm cussed with the patient uptitrating that back up for symptom control of his cramps, he continues on vitamin E for muscle cramps as well.     4.    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   #Skin GVHD- history of biopsy proven GVHD of the skin 8/30/2021; resolved.   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 WNL today 11/22  # Ocular GVHD: follows with ophthalmology. Maxitrol to lids, continue  refreshing drops QID. Score 3  Followed closely by Dr. Juarez and had ocular fittings 11/8 however no lens seen on exam today he will follow-up with her team for refitting   # Oral GVHD: dex s/s three-4 times a day; tac ointment to lips as needed.  Clotrimazole cream added after seeing dermatology.  Lichenoid changes have resolved with no other ulcers since 11/8     Previous therapies  Tacrolimus-previously decided to stay on same dose, no checks of levels if clinically stable, and no need switch to sirolimus. (keep tac low as gvhd is quiet and viremia). 5/10 level 45 with starting of COVID therapy and D-D intractions (Paxlovid for COVID19, which has a drug interaction with tacrolimus-Ritonavir increases serum levels of tacrolimus).   Tacrolimus level subtherapeutic, increase to 2.5 mg twice daily recently, 8/23/2022 instructed to change tacrolimus to 2 mg twice daily. 9/6 with mild NEGRA, hyperkalemia, hypomagnesemia, and reports some tremors and cramps, suspect tacrolimus to be high therefore empirically decreased to 1.5 twice daily, skip morning dose of tacrolimus and took 2 mg today after his afternoon labs. Decrease to 1mg BID on Saturday.  Sirolimus  9/21 despite fluids and a dose adjustment of tacrolimus patient seem to have persistence NORBERTO related NEGRA, therefore after discussion with the patient decision was made to transition to sirolimus that was started on 9/21, patient initially seem to tolerate this well with normalization of kidney function  10/11 presented with flares of ocular and oral GVHD, fluid retention and gain of 5-6 kg, lower extremity edema, abdominal distention, total protein to creatinine ratio elevated at 0.4, in addition to elevation in BUN and creatinine, HTN.  Picture most consistent with sirolimus related side effects.  Less likely that the patient will tolerate even a lower level of sirolimus.  Therefore the patient was instructed to stop sirolimus, last dose on 10/10. 10/14 level  3.5 appropriately trending down.     Corticosteroids  3/1-3/16: prednisone 100mg every day  3/17 75mg every day   3/31 75 alt with 50  4/6: 75 alt with 25mg every other day  4/12 pred tapered to 60 alternating with 25mg   4/15 In setting of viruses trying to reactivate,GVHD stable: Taper 60/15mg alternating.  4/20 decrease pred further to 50/10.    4/25 taper pred to 50/0 every other day as long as symptoms stable.   5/2 Pred decrease 40/0 alternating every other day.   5/13 decrease to 30/0  5/20 decrease to 20/0 then slowly by 5 mg weekly  6/7 decrease to 10/0  Went up to 15/0 with mild increased LFTs  8/23/2022 increased to 20/0, with persistence of oral ulcers and active ocular GVHD.  Discussed with the patient the importance of stopping chewing of nicotine gums for prolonged hours as that would not help with the healing of the oral ulcers.  I discussed with the patient alternatives of nicotine patches that he will reconsider and let me know.  9/6 decreased to 15 alternating with 0  9/13 decreased to 10 alternating with 0  9/21 discontinued tacrolimus given persistent NEGRA and single kidney and start the patient on sirolimus without loading dose.  We will get a list of possible side effects and adverse events from the Pharm.D. see sirolimus section above.  10/11 GVHD flare. Sirolimus stopped. Resume prednisone 40 mg daily as of 10/12, no change with mildly worsening eye pain 10/14  Reduce pred to 35mg 10/25 and 25mg 11/1 11/8 20 mg   11/22 15 mg  12/13/22 10 mg (plan to taper to 5mg then slow taper 1 mg per month given long pred history)    Belumosudil  Patient intolerant/with disease flares on tacrolimus and sirolimus see above.  Discussed with the patient the different options for therapy of chronic GVHD that are FDA approved.  Given the side effect profiles after discussing in detail and given the least nephrotoxicity and expected response, taking into account other metabolic effect as well.  We will proceed  with prior authorization with belumosudil.  Patient was given material to review for possible expected adverse events and side effects with both Belumosodil and ruxolitinib.   --- Started on 10/18 in p.m. - feeling tired with malaise since starting and worried the drug is responsible     5.  ID: Afebrile with no current signs or symptoms of infection.  He recently was admitted following influenza and is now improved    # cat bite/scratch: doxy 100mg BID x 10 days; completes 11/3/22.  Bite has healed, no signs of infection.     #History of COVID   Positive via homed test 5/8. Treated with Paxlovid with resolution of symptoms.  Had recurrence on 5/18. Resolved  Covid 11/21, 12/21, received his influenza annual vaccine as well as COVID boosters locally 11/16     #History of pulmonary infiltrates:   4/20 CT chest with new RUL infiltrate. Highly immunocompromised. 4/22 Fungitell/asp GM were neg. BAL 4/29 NGTD, increased micafungin to 150mg IV daily-- f/u 6/1 Dr Garcia CT with mixed results, followed with Dr. Garcia August 2022 with resolution of pulmonary nodules and normalization of LFTs we will switch patient will switch patient to posaconazole prophylactic dose and monitor LFTs and any infectious concerns closely (wnl stable 10/14)     #History of CMV viremia:    - CMV viremia up to 1100. 4/15 started valcyte 900mg PO BID. 4/20 CMV <137, 5/10 ND, decreased to 450mg BID 4/30 - continue 4 weeks as long as CMV <137 till 5/28 + weekly CMV  - Switch to acyclovir after completion of current therapy 5/28. 10/11 CMV ND. Check monthly on IST, 11/8 CMV <137, 11/22 ND, recheck 12/8     - PPx:   ACV  Posaconazole (previously on micafungin with LFts abl, hx of pulmonary nodules needign treatment dose).   Pentamidine, last 11/22  Azithro 250mg daily (stopped levaquin due to possible tendonitis).      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 S/p 4/22 and 5/4/22 rituximab 375mg/m2 5/10 ND x2, 6/7 ND, 8/18/2022 971,  10/11 ND, check every other month on IST, 11/8 ND  S/p covid vaccine series 12/2021  S/p anuradha   Deferred annual vaccines in the setting of GVHD and immunosuppression therapy     6. Endo:   - Hx of graves disease; On synthroid 175 mcg daily. TSH normal 4/6/2022 no reflex to T4.  Recheck on 10/18 T3 low at 0.03, free T3 and T4 within normal.  Subclinical hyperthyroidism, 11/22/222 and discussion with endocrine, reduce the levothyroxine to 150 mcg/day and repeat labs in 2-3.  He has follow-up appointment with endocrine    - HLD: 11/2022 cholesterol 355, triglycerides 153, -- 11/8 started rosuvastatin 5 mg daily we will recheck lipid profile in 3 months, 11/22 CPK within normal     7. CV:   - Hypertension history - now on low side after weight loss  - TTE most recently 10/2022     8. GI:   -Omeprazole for heartburn  -LFTs as above, now normal. 9/6 decreased ursodiol to daily      9. Psych:   - Situational anxiety - lexapro 10mg daily.   - Insomnia: worse on steroids. Ativan, trazadone, melatonin     10. MSK: left food drop, PT. Occasional muscle cramps discussed optimizing vitamin D levels and considering vitamin D, some of the symptomatology of muscle cramps are likely related to chronic GVHD.     11. HCM/Age appropriate cancer screening:  -Discussed with the patient importance of age-appropriate cancer screening including colonoscopies, he is due for this.  -Discussed importance of at least once a year vitamin D level check, 8/18/2022 wnl  -Screening for secondary iron overload, see heme section above, start vitamins on 12/13  -Yearly TSH 2022 wnl; yearly lipid panel - see GI section above  -9/6/2022 DEXA scan Based on BMD diagnosis is consistent with normal bone density based on WHO criteria Ref. 1   -PFTs 9/20/22, see above  -Metabolic other, 8/2022 testosterone within normal  -11/22/2022 discussed with the patient the challenges and the stresses of chronic illness and healthcare fatigue.  Patient had  previously been on Lexapro that we restarted today confirmed with pharmacy that there are no drug drug interactions.  Additionally we will refer patient to PMNR to help with physical rehab and strength to help with fatigue.  Our social workers will also reach out to Nik and his wife for further resources including support groups for patients with chronic illness and fatigue post transplant.       Summary:  He has mild GVHD symptoms and no evidence of active inflammation.     Plan  - lower prednisone to 5 mg daily and plan to lower by 1 mg per month afterward  - try flax seed oil for dry eyes and mouth  - monitor IgG levels   - use dex bid PRN    RTC one month      I spent 30 minutes in the care of this patient today, which included time necessary for preparation for the visit, obtaining history, ordering medications/tests/procedures as medically indicated, review of pertinent medical literature, counseling of the patient, communication of recommendations to the care team, and documentation time.    DOMINIQUE BRITTON MD

## 2023-01-12 NOTE — LETTER
1/12/2023         RE: Nik Nava  [address] [facility] [id number]        Dear Colleague,    Thank you for referring your patient, Nik Nava, to the [facility] [place]. Please see a copy of my visit note below.        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    HPI:      He has been happy about his overall sense of wellbeing and feels like all in all there is a trend of improvement.  He said no new signs of GVH.  His most notable site of chronic GVHD remains his eyes.  He does see an ophthalmologist periodically and continues to drops.  His mouth is intermittently sensitive to certain foods but is not bothersome otherwise.  He continues to use the dexamethasone although intermittently.  He is feeling improved since decreasing the prednisone to 5 mg/day and his renal function has also improved.  He has slight edema in both legs and is not taking blood pressure medications or diuretics.    Review of Systems                                                                                                                                         10 point Review of systems was negative except as detailed above     PHYSICAL EXAM                                                                                                                                                   KPS: 80    BP (!) 141/82   Pulse 71   Temp 98  F (36.7  C)   Resp 16   Wt 113.9 kg (251 lb)   SpO2 98%   BMI 33.12 kg/m      104/66 is standing BP.  Sitting BP is 122/77    Wt Readings from Last 4 Encounters:   01/12/23 113.9 kg (251 lb)   01/10/23 113.5 kg (250 lb 3.2 oz)   12/27/22 111.7 kg (246 lb 4.8 oz)   12/13/22 111.1 kg (245 lb)      General: NAD.  HEENT: sclera anicteric, injected conjunctivae. No lichenoid changes in oral mucosa, no ulcers seen.    Ext/ Skin: no  rash.  LE 1+ bilateral edema in lower extremities; wearing compression socks-improved  Access: port-a-cath    cGVHD therapy started on February 24 2022  - Baseline NIH scoring 2/24/2022 : skin 2 maculopapular rash/erythema with no sclerotic features, mouth score 1 mild symptoms with lichenoid changes less than 25%, eyes score 2 moderate symptoms without new visual impairments due to KCS requiring lubricant eyedrops more than 3 times a day, overall mild scale for her provider  - 3/25/2022 1 month NIH treatment assessment on PQRST: skin 2, mouth score 1 mild symptoms with NO lichenoid changes, eyes score 2 moderate symptoms w requiring lubricant eyedrops more than 3 times a day, liver score 1 ALT 3.7x ULN and nl bilirubin   - 3/31  Mouth clear; eyes minimal LFT still abn; no joint or new GI sx  - 4/28 Mouth clear, Left>R eye is mild sclera erythema, lids are pink and irritated appearing, LFT's slow improvement, no new joint, skin, or GI symptoms.   -5/11 mouth with mild erythema but no lichenoid changes, eyes using eyedrops 4-6 times a day score of 2, LFTs significantly improved from baseline slight elevation in alk phos and AST with normal bilirubin, skin with hyperpigmentation from old acute GVHD no active skin rashes, no GI symptoms no musculoskeletal complaints.  -5/26 Mouth with very slight lichenoid changes, erythema.  Eyes still using eyedrops 4-6x per day.  LFTs essentially normal with slight elevation in alk phos.  Skin with hyperpigmentation from old acute GVHD, no active rashes, no GI or MSK concerns.  Skin 0, mouth 0 but with lichenoid changes, eyes 2  -6/7/22 Mouth with no lichenoid changes, erythema.  Eyes still using eyedrops 4-6x per day.  LFTs essentially normal with slight elevation in alk phos.  Skin with hyperpigmentation from old acute GVHD, no active rashes, no GI or MSK concerns.  Skin 0, mouth 0, eyes 2     -6 month PQRST assessment 9/6/2022: eyes 3, mouth 0 for symptoms, 25% lichenoid changes  (1), liver/GI/skin 0    11/8/2022, 11/22/2022, 12/13/22 cGVHD NIG scoring eyes 3, no other organ involvement clinically    Lab:    Lab Results   Component Value Date    WBC 9.3 01/12/2023    ANEU 3.5 10/26/2021    HGB 11.0 (L) 01/12/2023    HCT 32.9 (L) 01/12/2023     01/12/2023     01/12/2023    POTASSIUM 5.0 01/12/2023    CHLORIDE 104 01/12/2023    CO2 26 01/12/2023    GLC 88 01/12/2023    BUN 39.5 (H) 01/12/2023    CR 1.05 01/12/2023    MAG 1.9 01/12/2023    INR 0.90 12/01/2022    BILITOTAL 0.8 01/12/2023    AST 36 01/12/2023    ALT 33 01/12/2023    ALKPHOS 103 01/12/2023    PROTTOTAL 6.2 (L) 01/12/2023    ALBUMIN 4.0 01/12/2023       ASSESSMENT AND PLAN   Nik Nava is a 62 year old male with Ph Pos ALL, day 520 s/p sib allo stem cell transplant    Day -6 (8/4): flu/cy  Day -5 through day -2 (8/5-8/8): flu  Day -1 (8/9): TBI  Day 0 (8/10): transplant     1.  Acute lymphoblastic leukemia, [place] chromosome positive in CR, MRD neg. S/p allo sib PBSCT. (ABO matched)  HCT-CI score: 3 (prior solid tumor)  Day +100 bone marrow biopsy is 100% donor with no morphologic or flow cytometric evidence of leukemia BCR abl is pending.  - Day +180 restaging BM bx- still in CR  100% donor;  2/16/22 BCR ABL major breakpoint undetectable.   - 1 year restaging 8/18/2022: Markedly hypocellular bone marrow [less than 10% cellular] with maturing trilineage hematopoiesis and no definite morphologic or immunophenotypic evidence for involvement by B lymphoblastic leukemia. BCRABL1 PCR not detected, bone marrow % donor. FISH NORMAL No evidence of BCR-ABL1 fusion  - Maintenance with TKI posttransplantation given his Ph positive ALL that have not been started previously presumed secondary to multiple active issues specifically infections and COVID.  Per Avila Tobar: will reconsider this patient will think about whether this is something he would like to consider he was previously on Dasatinib, we would start on a  low dose and increase as tolerated.  This is on hold now pending active GVHD therapy, we discussed on this visit 10/18 in agreement with holding off.     2.  HEME:  - Pulmonary emboli: He developed a pulmonary emboli in the setting of receiving PEG asparaginase (ie provoked thrombus).  Xarelto complete.   -At 1 year anniversary visit completed screening for secondary iron overload, 8/2022 ferritin 1023, recheck on 10/18 increased to 2348, expect this is acute phase reactant with recent GVHD flare.    - 11/8 ferritin 2812, MRI completed 11/2022 confirming iron overload, therefore today 11/22 I discussed with the patient starting phlebotomies as I worry about his tolerance to iron chelators specifically regarding his renal function, will start phlebotomies 250 mL every 4 weeks as tolerated and plan to keep his hemoglobin more than 11-volume can be titrated as well as frequency depending on his tolerance and hemoglobin as well as follow-up ferritin levels.      3.  FEN/Renal:  - Cr improved with switching patient to sirolimus briefly, then with increased Cr on 10/11 with third spacing. Total protein to creatinine ratio 0.41 on 10/11 recheck 0.39 on 10/18. 11/8 creatinine up to 1.4, 11/22 creatinine down to 1.25   - He has a history of renal cancer and resection. Has a single kidney.  - 11/1/2022 seen by nephrology: multifactorial, focus on BP control, Started Chlorthalidone 12.5 mg daily and reduce amlodipine to 2.5 mg per day, Low salt diet and reduce ice tea. Next step: may increase chlorthalidone if Cr stable but if not improving in swelling, may start torsemide Follow-up in 2 month   -Hypomagnesemia,11/8 decrease magnesium to 2 tablets 2 times a day-seems to have triggered more muscle cramps specifically in his right arm cussed with the patient uptitrating that back up for symptom control of his cramps, he continues on vitamin E for muscle cramps as well.     4.    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   #Skin GVHD- history of biopsy proven GVHD of the skin 8/30/2021; resolved.   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 WNL today 11/22  # Ocular GVHD: follows with ophthalmology. Maxitrol to lids, continue refreshing drops QID. Score 3  Followed closely by Dr. Juarez and had ocular fittings 11/8 however no lens seen on exam today he will follow-up with her team for refitting   # Oral GVHD: dex s/s three-4 times a day; tac ointment to lips as needed.  Clotrimazole cream added after seeing dermatology.  Lichenoid changes have resolved with no other ulcers since 11/8     Previous therapies  Tacrolimus-previously decided to stay on same dose, no checks of levels if clinically stable, and no need switch to sirolimus. (keep tac low as gvhd is quiet and viremia). 5/10 level 45 with starting of COVID therapy and D-D intractions (Paxlovid for COVID19, which has a drug interaction with tacrolimus-Ritonavir increases serum levels of tacrolimus).   Tacrolimus level subtherapeutic, increase to 2.5 mg twice daily recently, 8/23/2022 instructed to change tacrolimus to 2 mg twice daily. 9/6 with mild NEGRA, hyperkalemia, hypomagnesemia, and reports some tremors and cramps, suspect tacrolimus to be high therefore empirically decreased to 1.5 twice daily, skip morning dose of tacrolimus and took 2 mg today after his afternoon labs. Decrease to 1mg BID on Saturday.  Sirolimus  9/21 despite fluids and a dose adjustment of tacrolimus patient seem to have persistence NORBERTO related NEGRA, therefore after discussion with the patient decision was made to transition to sirolimus that was started on 9/21, patient initially seem to tolerate this well with normalization of kidney function  10/11 presented with flares of ocular and oral GVHD, fluid retention and gain of 5-6 kg, lower extremity edema, abdominal distention, total protein to creatinine ratio elevated at 0.4, in  addition to elevation in BUN and creatinine, HTN.  Picture most consistent with sirolimus related side effects.  Less likely that the patient will tolerate even a lower level of sirolimus.  Therefore the patient was instructed to stop sirolimus, last dose on 10/10. 10/14 level 3.5 appropriately trending down.     Corticosteroids  3/1-3/16: prednisone 100mg every day  3/17 75mg every day   3/31 75 alt with 50  4/6: 75 alt with 25mg every other day  4/12 pred tapered to 60 alternating with 25mg   4/15 In setting of viruses trying to reactivate,GVHD stable: Taper 60/15mg alternating.  4/20 decrease pred further to 50/10.    4/25 taper pred to 50/0 every other day as long as symptoms stable.   5/2 Pred decrease 40/0 alternating every other day.   5/13 decrease to 30/0  5/20 decrease to 20/0 then slowly by 5 mg weekly  6/7 decrease to 10/0  Went up to 15/0 with mild increased LFTs  8/23/2022 increased to 20/0, with persistence of oral ulcers and active ocular GVHD.  Discussed with the patient the importance of stopping chewing of nicotine gums for prolonged hours as that would not help with the healing of the oral ulcers.  I discussed with the patient alternatives of nicotine patches that he will reconsider and let me know.  9/6 decreased to 15 alternating with 0  9/13 decreased to 10 alternating with 0  9/21 discontinued tacrolimus given persistent NEGRA and single kidney and start the patient on sirolimus without loading dose.  We will get a list of possible side effects and adverse events from the Pharm.D. see sirolimus section above.  10/11 GVHD flare. Sirolimus stopped. Resume prednisone 40 mg daily as of 10/12, no change with mildly worsening eye pain 10/14  Reduce pred to 35mg 10/25 and 25mg 11/1 11/8 20 mg   11/22 15 mg  12/13/22 10 mg (plan to taper to 5mg then slow taper 1 mg per month given long pred history)    Belumosudil  Patient intolerant/with disease flares on tacrolimus and sirolimus see above.   Discussed with the patient the different options for therapy of chronic GVHD that are FDA approved.  Given the side effect profiles after discussing in detail and given the least nephrotoxicity and expected response, taking into account other metabolic effect as well.  We will proceed with prior authorization with belumosudil.  Patient was given material to review for possible expected adverse events and side effects with both Belumosodil and ruxolitinib.   --- Started on 10/18 in p.m. - feeling tired with malaise since starting and worried the drug is responsible     5.  ID: Afebrile with no current signs or symptoms of infection.  He recently was admitted following influenza and is now improved    # cat bite/scratch: doxy 100mg BID x 10 days; completes 11/3/22.  Bite has healed, no signs of infection.     #History of COVID   Positive via homed test 5/8. Treated with Paxlovid with resolution of symptoms.  Had recurrence on 5/18. Resolved  Covid 11/21, 12/21, received his influenza annual vaccine as well as COVID boosters locally 11/16     #History of pulmonary infiltrates:   4/20 CT chest with new RUL infiltrate. Highly immunocompromised. 4/22 Fungitell/asp GM were neg. BAL 4/29 NGTD, increased micafungin to 150mg IV daily-- f/u 6/1 Dr Garcia CT with mixed results, followed with Dr. Garcia August 2022 with resolution of pulmonary nodules and normalization of LFTs we will switch patient will switch patient to posaconazole prophylactic dose and monitor LFTs and any infectious concerns closely (wnl stable 10/14)     #History of CMV viremia:    - CMV viremia up to 1100. 4/15 started valcyte 900mg PO BID. 4/20 CMV <137, 5/10 ND, decreased to 450mg BID 4/30 - continue 4 weeks as long as CMV <137 till 5/28 + weekly CMV  - Switch to acyclovir after completion of current therapy 5/28. 10/11 CMV ND. Check monthly on IST, 11/8 CMV <137, 11/22 ND, recheck 12/8     - PPx:   ACV  Posaconazole (previously on micafungin with LFts  abl, hx of pulmonary nodules needign treatment dose).   Pentamidine, last 11/22  Azithro 250mg daily (stopped levaquin due to possible tendonitis).      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 S/p 4/22 and 5/4/22 rituximab 375mg/m2 5/10 ND x2, 6/7 ND, 8/18/2022 971, 10/11 ND, check every other month on IST, 11/8 ND  S/p covid vaccine series 12/2021  S/p evusheld   Deferred annual vaccines in the setting of GVHD and immunosuppression therapy     6. Endo:   - Hx of graves disease; On synthroid 175 mcg daily. TSH normal 4/6/2022 no reflex to T4.  Recheck on 10/18 T3 low at 0.03, free T3 and T4 within normal.  Subclinical hyperthyroidism, 11/22/222 and discussion with endocrine, reduce the levothyroxine to 150 mcg/day and repeat labs in 2-3.  He has follow-up appointment with endocrine    - HLD: 11/2022 cholesterol 355, triglycerides 153, -- 11/8 started rosuvastatin 5 mg daily we will recheck lipid profile in 3 months, 11/22 CPK within normal     7. CV:   - Hypertension history - now on low side after weight loss  - TTE most recently 10/2022     8. GI:   -Omeprazole for heartburn  -LFTs as above, now normal. 9/6 decreased ursodiol to daily      9. Psych:   - Situational anxiety - lexapro 10mg daily.   - Insomnia: worse on steroids. Ativan, trazadone, melatonin     10. MSK: left food drop, PT. Occasional muscle cramps discussed optimizing vitamin D levels and considering vitamin D, some of the symptomatology of muscle cramps are likely related to chronic GVHD.     11. HCM/Age appropriate cancer screening:  -Discussed with the patient importance of age-appropriate cancer screening including colonoscopies, he is due for this.  -Discussed importance of at least once a year vitamin D level check, 8/18/2022 wnl  -Screening for secondary iron overload, see heme section above, start vitamins on 12/13  -Yearly TSH 2022 wnl; yearly lipid panel - see GI section above  -9/6/2022 DEXA scan Based on BMD  diagnosis is consistent with normal bone density based on WHO criteria Ref. 1   -PFTs 9/20/22, see above  -Metabolic other, 8/2022 testosterone within normal  -11/22/2022 discussed with the patient the challenges and the stresses of chronic illness and healthcare fatigue.  Patient had previously been on Lexapro that we restarted today confirmed with pharmacy that there are no drug drug interactions.  Additionally we will refer patient to PMNR to help with physical rehab and strength to help with fatigue.  Our social workers will also reach out to Nik and his wife for further resources including support groups for patients with chronic illness and fatigue post transplant.       Summary:  He has mild GVHD symptoms and no evidence of active inflammation.     Plan  - lower prednisone to 5 mg daily and plan to lower by 1 mg per month afterward  - try flax seed oil for dry eyes and mouth  - monitor IgG levels   - use dex bid PRN    RTC one month      I spent 30 minutes in the care of this patient today, which included time necessary for preparation for the visit, obtaining history, ordering medications/tests/procedures as medically indicated, review of pertinent medical literature, counseling of the patient, communication of recommendations to the care team, and documentation time.    DOMINIQUE BRITTON MD

## 2023-01-13 LAB — IGG SERPL-MCNC: 364 MG/DL (ref 610–1616)

## 2023-01-16 ENCOUNTER — TELEPHONE (OUTPATIENT)
Dept: TRANSPLANT | Facility: CLINIC | Age: 63
End: 2023-01-16

## 2023-01-16 ENCOUNTER — TELEPHONE (OUTPATIENT)
Dept: ONCOLOGY | Facility: CLINIC | Age: 63
End: 2023-01-16

## 2023-01-16 ENCOUNTER — TELEPHONE (OUTPATIENT)
Dept: OPTOMETRY | Facility: CLINIC | Age: 63
End: 2023-01-16

## 2023-01-16 NOTE — TELEPHONE ENCOUNTER
BMT Nurse Triage - Generic/Other Symptoms     Treating Provider:  Dr. Chris Cote (covering during Dr. Avila Tobar's BARRON)    Date of last office visit:  1/12/23    Onset of symptoms:  Overnight last night     Brief description of symptoms:  Pt states he began coughing overnight; today took an at-home Covid test which was positive (although faint). Pt reports fatigue; denies fever or any other symptoms. Pt recently established with PCP and was advised to call their nurse line to request Paxlovid prescription today. Pt was agreeable to this.    Of note - pt is on steroid taper for GVHD flare several months ago. With most recent decrease in steroid dosing, notes increased redness in R eye. Did have eye appt with Dr. Juarez scheduled tomorrow 1/17, which pt and wife called the eye clinic to inquire about given the Covid positive. Appt looks to have been canceled; pt and wife plan to call back for further guidance on rescheduling this.    Pt also has next monthly pentamidine scheduled for next Fri 1/27 in clinic; if still Covid positive at that time will need to reschedule. Pt and wife would like further advisement from Dr. Cote on how to proceed with further PJP prophy at that time if needed.    Routing note to pt's OP RNCC-Victor Manuel for further guidance/request for discussion with Dr. Cote regarding pentamidine.      Haley Diaz, RN, BSN  RN Care Coordinator - BMT  Ph [phone]   [phone]

## 2023-01-16 NOTE — TELEPHONE ENCOUNTER
[facility] Call Center    Phone Message    May a detailed message be left on voicemail: yes     Reason for Call: Other: Patient was scheduled for an appointment tomorrow but is unable to come due to illness. He would like a call back from clinical staff. He has some questions about medication etc.      Action Taken: Other: Eye    Travel Screening: Not Applicable

## 2023-01-17 NOTE — TELEPHONE ENCOUNTER
I spoke to the patient a second time after consulting with the provider.  The Provider recommends lid hygiene with either lid wipes or baby shampoo on the lid margin near the eyelashes.  I explained to the patient and he expresses understanding.  He also should increase artificial tears.  He will call if symptoms worsen and develops eye pain or vision change.

## 2023-01-17 NOTE — TELEPHONE ENCOUNTER
I spoke to the patient who cancelled his appointment because of positive COVID.  He reports a cough and fatigue for symptoms.  He note his provider dropped his Prednisone to 5mg.  Since then his has noticed right eye redness (Right to left).  He also has a milky film that collects in the corner of his eyes. His symptoms are worse in the right eye.     He has no eye pain or vision change.  He uses frequent artificial tears.  He wonders if he should do anything else while waiting for his follow up appointment.

## 2023-01-19 ENCOUNTER — TELEPHONE (OUTPATIENT)
Dept: TRANSPLANT | Facility: CLINIC | Age: 63
End: 2023-01-19

## 2023-01-19 NOTE — TELEPHONE ENCOUNTER
"Spoke with patient regarding questions with scheduling in relation to his recent COVID infection.   HE states that symptoms are improving and he is \"half way\" through his paxlovid treatment.   PFT lab confirms patient may proceed with pentamidine as scheduled on 1/27/23, despite infection.  He also states that eye redness he had noted is improving. He is encouraged to increase frequency of the refresh drops suring the day to reduce irritation until he can be seen by ophthalmology (rescheduled appt d/t infection)     "

## 2023-01-26 ENCOUNTER — DOCUMENTATION ONLY (OUTPATIENT)
Dept: OPTOMETRY | Facility: CLINIC | Age: 63
End: 2023-01-26

## 2023-01-26 ENCOUNTER — TELEPHONE (OUTPATIENT)
Dept: TRANSPLANT | Facility: CLINIC | Age: 63
End: 2023-01-26
Payer: COMMERCIAL

## 2023-01-26 ENCOUNTER — TELEPHONE (OUTPATIENT)
Dept: ONCOLOGY | Facility: CLINIC | Age: 63
End: 2023-01-26
Payer: COMMERCIAL

## 2023-01-26 NOTE — PROGRESS NOTES
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 He is coming for other appt 1/27/23 and can pickup    [employer] 7.9/14.9/-2.00

## 2023-01-26 NOTE — TELEPHONE ENCOUNTER
BMT Nurse Triage - Generic/Other Symptoms     Treating Provider:   Dr. Cote (during Dr. Avila Tobar's BARRON)    Date of last office visit: 1/12/23    Onset of symptoms:  1/16/23 (date of positive Covid test)    Brief description of symptoms:  Ongoing low grade fevers (highest 100.5) since date of positive test. Pt has been taking DayQuil frequently (per package directions).     Pt called triage line today to report ongoing fevers since Covid positive test 10 days ago. (At that time, pt was instructed to call the triage line for his newly established PCP to request Paxlovid, which he did and Paxlovid was initiated thereafter.)    For today's call regarding ongoing fevers with positive Covid, pt was instructed to contact his PCP again since they are currently managing his Covid. Pt and wife were agreeable to this.     Pt and wife asked about pt's appt tomorrow for inhaled pentamidine (which was previously verified as ok to proceed with Covid positive) - inquiring whether the ongoing correlating fevers would make a difference to this decision. I called PFT lab at [phone] to verify that pt can indeed still go to his pentamidine appt tomorrow. Called pt and wife back to relay this. Patient and wife had no further questions.    Note will be routed to pt's OP RNCC-Victor Manuel Gtz.      Haley Diaz, RN, BSN  RN Care Coordinator - BMT  Ph [phone]  Pg [phone]                [phone]

## 2023-01-27 DIAGNOSIS — C91.01 ACUTE LYMPHOBLASTIC LEUKEMIA (ALL) IN REMISSION (H): Primary | ICD-10-CM

## 2023-01-27 PROCEDURE — 94640 AIRWAY INHALATION TREATMENT: CPT | Performed by: INTERNAL MEDICINE

## 2023-01-27 PROCEDURE — 94642 AEROSOL INHALATION TREATMENT: CPT | Performed by: INTERNAL MEDICINE

## 2023-01-27 RX ORDER — PENTAMIDINE ISETHIONATE 300 MG/300MG
300 INHALANT RESPIRATORY (INHALATION)
Status: CANCELLED
Start: 2023-02-22

## 2023-01-27 RX ORDER — ALBUTEROL SULFATE 0.83 MG/ML
2.5 SOLUTION RESPIRATORY (INHALATION)
Status: DISCONTINUED | OUTPATIENT
Start: 2023-01-27 | End: 2023-01-27 | Stop reason: HOSPADM

## 2023-01-27 RX ORDER — PENTAMIDINE ISETHIONATE 300 MG/300MG
300 INHALANT RESPIRATORY (INHALATION)
Status: DISCONTINUED | OUTPATIENT
Start: 2023-01-27 | End: 2023-01-27 | Stop reason: HOSPADM

## 2023-01-27 RX ORDER — ALBUTEROL SULFATE 0.83 MG/ML
2.5 SOLUTION RESPIRATORY (INHALATION)
Status: CANCELLED
Start: 2023-02-22

## 2023-01-27 RX ADMIN — PENTAMIDINE ISETHIONATE 300 MG: 300 INHALANT RESPIRATORY (INHALATION) at 10:08

## 2023-01-27 RX ADMIN — ALBUTEROL SULFATE 2.5 MG: 0.83 SOLUTION RESPIRATORY (INHALATION) at 10:00

## 2023-01-27 NOTE — PROGRESS NOTES
Nik Nava was seen today for a Pentamidine nebulizer tx ordered by Dr. Chapo [place].    Patient was first given 2.5 mg of albuterol nebulizer, after which Pentamidine 300 mg (Lot # [id number]) mixed with 6cc Sterile H20 was administered through a filtered nebulizer.    Pre-treatment: SpO2 = 98%   HR = 102   BBS =Ronchi  Post-treatment: SpO2 = 96%  HR = 99  BBS = ronchi    No adverse side effects noted by the patient.    This service today was provided under the  supervision of Dr. Young Muñoz , who was available if needed.     Procedure was completed by Ayan Clarke, LUNA.

## 2023-02-08 ENCOUNTER — OFFICE VISIT (OUTPATIENT)
Dept: OPTOMETRY | Facility: CLINIC | Age: 63
End: 2023-02-08
Payer: COMMERCIAL

## 2023-02-08 DIAGNOSIS — D89.813 GVHD AS COMPLICATION OF BONE MARROW TRANSPLANT (H): ICD-10-CM

## 2023-02-08 DIAGNOSIS — T86.09 GVHD AS COMPLICATION OF BONE MARROW TRANSPLANT (H): ICD-10-CM

## 2023-02-08 DIAGNOSIS — H16.123 FILAMENTARY KERATITIS OF BOTH EYES: ICD-10-CM

## 2023-02-08 DIAGNOSIS — H04.129 DRY EYE: Primary | ICD-10-CM

## 2023-02-08 RX ORDER — AZITHROMYCIN 250 MG/1
250 TABLET, FILM COATED ORAL DAILY
Qty: 30 TABLET | Refills: 3 | Status: SHIPPED | OUTPATIENT
Start: 2023-02-08 | End: 2023-06-20

## 2023-02-08 ASSESSMENT — REFRACTION_CURRENTRX
OS_DIAMETER: 14.9
OD_BRAND: ONEFIT
OS_SPHERE: -2.00
OS_BRAND: ONEFIT TRIAL
OD_DIAMETER: 15.2
OD_BASECURVE: 8.2
OD_ADDL_SPECS: OPT EXTRA CLEAR, HYDRAPEG
OD_SPHERE: +2.75
OS_BASECURVE: 7.9

## 2023-02-08 ASSESSMENT — TONOMETRY
IOP_METHOD: ICARE
OS_IOP_MMHG: 11
OD_IOP_MMHG: 10

## 2023-02-08 ASSESSMENT — SLIT LAMP EXAM - LIDS
COMMENTS: 1+ BLEPH, THICKENED MARGINS, 1-2+ MGD
COMMENTS: 1+ BLEPH, THICKENED MARGINS, 1-2+ MGD

## 2023-02-08 ASSESSMENT — VISUAL ACUITY
OD_CC: 20/70
METHOD: SNELLEN - LINEAR
CORRECTION_TYPE: CONTACTS
OS_CC: 20/60-2

## 2023-02-08 NOTE — PROGRESS NOTES
A/P  1.) Dry Eye OU  -s/p BMT 08/2021  -Worsening dryness right eye>left eye, symptomatic for photophobia/tearing  -Failed: Restasis/Xiidra (stinging), plugs (scarred puncta)  -Now not tolerating BCL (does not retain in the eye). Tapering off oral steroids, eyes significantly worse. Right eye >>>> left eye  -Now excellent response to scleral lens - corneal staining resolved OU. Much improved ocular comfort.   -Wife helping with I&R. Pt on oral steroids causing hands to shake, unable to do himself  -Some fogging/deposits right eye>left eye. Rec AT over while in and saline rinses instead of tap water on lenses  -Vault slightly high but tolerating well. Switch to smaller diam OU  -Continue FML bid, PFAT prn    2.) Hyperopia/Presbyopia OU  -BCVA 20/30 right eye, 20/25 left eye  -Cataract progression related to oral steroid use but monitor only for now    Order new pair with updated Rx and mail direct to pt/ F/u 3-4 months, sooner prn

## 2023-02-13 ENCOUNTER — LAB (OUTPATIENT)
Dept: ONCOLOGY | Facility: CLINIC | Age: 63
End: 2023-02-13
Payer: COMMERCIAL

## 2023-02-13 PROCEDURE — 96523 IRRIG DRUG DELIVERY DEVICE: CPT | Performed by: NURSE PRACTITIONER

## 2023-02-13 RX ORDER — HEPARIN SODIUM (PORCINE) LOCK FLUSH IV SOLN 100 UNIT/ML 100 UNIT/ML
5 SOLUTION INTRAVENOUS
Status: DISCONTINUED | OUTPATIENT
Start: 2023-02-13 | End: 2023-02-13 | Stop reason: HOSPADM

## 2023-02-13 RX ADMIN — HEPARIN SODIUM (PORCINE) LOCK FLUSH IV SOLN 100 UNIT/ML 5 ML: 100 SOLUTION at 13:08

## 2023-02-13 NOTE — PROGRESS NOTES
Port site scrubbed with Chloraprep for 30 seconds. Accessed port using sterile technique.  See documentation flowsheet. Tolerated port access, saline flushed, heparin locked and port de-accessed without complaint.     Brandy Carrillo RN

## 2023-02-14 ENCOUNTER — CARE COORDINATION (OUTPATIENT)
Dept: TRANSPLANT | Facility: CLINIC | Age: 63
End: 2023-02-14
Payer: COMMERCIAL

## 2023-02-14 ENCOUNTER — TELEPHONE (OUTPATIENT)
Dept: TRANSPLANT | Facility: CLINIC | Age: 63
End: 2023-02-14
Payer: COMMERCIAL

## 2023-02-14 NOTE — PROGRESS NOTES
"BMT Nurse Triage - Nausea and/or Vomiting:     Treating Provider:   Dr. Avila Tobar (Dr. Cote treating while she is out on leave)    Date of last office visit: 1/12/23    Onset of symptoms: Did not have exact date, but has been going on for about 2 weeks now     Frequency & Duration: Reports feelings of nausea daily (comes and goes, but becoming more and more frequent)     Has patient received recent chemotherapy (if yes, when): No    Medications    Has patient taken any antiemetic medication: No  - if yes, what medications and last taken dose: Patient reports that he has not taken any antiemetic medications.  He has tried Tums, without much relief. Also reports trying to sip on coke occasionally as this has helped him in the past, but not providing relief this time around. Nik also reports resuming Senna thinking that some of the nausea may be related to being \"backed up\".  Now having daily bowel movements, but on the harder side and sometimes only marbles.    Has patient started any new medications: No  - if yes, what is the new medication:  N/A      Narcotic medication use: No    Is vomiting present: No  - last episode: per patient no actual emesis  - Concerns of Blood or coffee ground emesis: No    Oral intake for the last 24 hours:   Regular diet, but only tolerating liquids due to associated symptoms.   Per Nik, he is tolerating a regular diet, though as much less of an appetite, can only eat about 1/2 of his meals if that.  Has been trying to drink more fluids, but also feels like this is lacking due to the nausea feeling associated with eating/drinking.    Urine output:   No changes in voiding. Normal frequency and characteristics.    Is there pain associated with symptoms (if yes, describe): Yes: Nik states he does have intermittent stomach pain associated with the nausea, but also describes feeling very bloated most of the time    Does patient have other flu like symptoms: No   - if fever present what " is patients most recent temperature: N/A    Additional information (if necessary): Nik also states that he has been extremely fatigued over the last few weeks (much worse than his normal baseline).  Both Nik and his wife Lamar stated that his eyes have been doing pretty well, and mouth is stable, but they expressed concerns about this ongoing nausea possibly being GVHD related.    N/A      Grades for reference:       Grade 1 - Educate and  if:   o Nausea but able to eat   o One vomiting episode in 24 hours   o Recent additional of antibiotic or analgesic   o Other household members are ill      Grade 2 - Encourage patient to see care within 24 hours if:  o Significant decrease in intake for more than 24 hours   o Vomiting > 6 times in 24 hour period   o Weakness and or dizziness.   o Persistent nausea and vomiting after 24 hours of antiemetic therapy       Grade 3 - Encourage patient to seek care immediately if:   o Blood or coffee ground emesis  o Severe pain with vomiting   o For Temps greater than 100.4   o Sudden projectile vomiting         Recommendations:   Nik and Lamar both felt like he should not wait until next scheduled appointment on 2/23/23 given the ongoing symptoms.  When asked about a primary care physician, he mentioned that he has not really established care as we have been following him at least monthly thus far.  Encouraged Nik to continue to work on increasing fluids and let him and Lamar know that I would page the triage provider for further guidance.    ADDENDUM:     Spoke with triage GIRISH (Lashanda Figueroa) who recommended that Nik come into clinic on 2/15/23 for labs, infusion for IV fluids, and GIRISH follow up.  These appointments have been scheduled and Nik is aware.  Reviewed that he should continue to work on fluid intake and utilize drinks with additional calories/nutrients (I.e., ensure, gatorade, coca cola, etc.).  Advised Nik that if he feels light headed or faint that he  should consider being evaluated at the emergency room as well. Nik and Lamar were in agreement with this plan and have no additional questions at this time.

## 2023-02-15 ENCOUNTER — APPOINTMENT (OUTPATIENT)
Dept: LAB | Facility: CLINIC | Age: 63
End: 2023-02-15
Attending: INTERNAL MEDICINE
Payer: COMMERCIAL

## 2023-02-15 ENCOUNTER — ONCOLOGY VISIT (OUTPATIENT)
Dept: TRANSPLANT | Facility: CLINIC | Age: 63
End: 2023-02-15
Attending: INTERNAL MEDICINE
Payer: COMMERCIAL

## 2023-02-15 VITALS
SYSTOLIC BLOOD PRESSURE: 123 MMHG | WEIGHT: 245 LBS | RESPIRATION RATE: 18 BRPM | HEART RATE: 81 BPM | DIASTOLIC BLOOD PRESSURE: 77 MMHG | OXYGEN SATURATION: 98 % | BODY MASS INDEX: 32.33 KG/M2 | TEMPERATURE: 98 F

## 2023-02-15 DIAGNOSIS — Z94.81 S/P BONE MARROW TRANSPLANT (H): Primary | ICD-10-CM

## 2023-02-15 DIAGNOSIS — Z94.81 S/P BONE MARROW TRANSPLANT (H): ICD-10-CM

## 2023-02-15 LAB
ALBUMIN SERPL BCG-MCNC: 4.1 G/DL (ref 3.5–5.2)
ALP SERPL-CCNC: 94 U/L (ref 40–129)
ALT SERPL W P-5'-P-CCNC: 24 U/L (ref 10–50)
ANION GAP SERPL CALCULATED.3IONS-SCNC: 10 MMOL/L (ref 7–15)
AST SERPL W P-5'-P-CCNC: 32 U/L (ref 10–50)
BASOPHILS # BLD AUTO: 0 10E3/UL (ref 0–0.2)
BASOPHILS NFR BLD AUTO: 0 %
BILIRUB SERPL-MCNC: 0.7 MG/DL
BUN SERPL-MCNC: 31.3 MG/DL (ref 8–23)
CALCIUM SERPL-MCNC: 10 MG/DL (ref 8.8–10.2)
CHLORIDE SERPL-SCNC: 101 MMOL/L (ref 98–107)
CREAT SERPL-MCNC: 1.28 MG/DL (ref 0.67–1.17)
DEPRECATED HCO3 PLAS-SCNC: 26 MMOL/L (ref 22–29)
EOSINOPHIL # BLD AUTO: 0.1 10E3/UL (ref 0–0.7)
EOSINOPHIL NFR BLD AUTO: 1 %
ERYTHROCYTE [DISTWIDTH] IN BLOOD BY AUTOMATED COUNT: 15.2 % (ref 10–15)
GFR SERPL CREATININE-BSD FRML MDRD: 62 ML/MIN/1.73M2
GLUCOSE SERPL-MCNC: 109 MG/DL (ref 70–99)
HCT VFR BLD AUTO: 34 % (ref 40–53)
HGB BLD-MCNC: 11.5 G/DL (ref 13.3–17.7)
IMM GRANULOCYTES # BLD: 0.1 10E3/UL
IMM GRANULOCYTES NFR BLD: 1 %
LYMPHOCYTES # BLD AUTO: 4.1 10E3/UL (ref 0.8–5.3)
LYMPHOCYTES NFR BLD AUTO: 41 %
MAGNESIUM SERPL-MCNC: 2 MG/DL (ref 1.7–2.3)
MCH RBC QN AUTO: 34.5 PG (ref 26.5–33)
MCHC RBC AUTO-ENTMCNC: 33.8 G/DL (ref 31.5–36.5)
MCV RBC AUTO: 102 FL (ref 78–100)
MONOCYTES # BLD AUTO: 1.3 10E3/UL (ref 0–1.3)
MONOCYTES NFR BLD AUTO: 13 %
NEUTROPHILS # BLD AUTO: 4.5 10E3/UL (ref 1.6–8.3)
NEUTROPHILS NFR BLD AUTO: 44 %
NRBC # BLD AUTO: 0 10E3/UL
NRBC BLD AUTO-RTO: 0 /100
PLATELET # BLD AUTO: 163 10E3/UL (ref 150–450)
POTASSIUM SERPL-SCNC: 4.5 MMOL/L (ref 3.4–5.3)
PROT SERPL-MCNC: 6.6 G/DL (ref 6.4–8.3)
RBC # BLD AUTO: 3.33 10E6/UL (ref 4.4–5.9)
SODIUM SERPL-SCNC: 137 MMOL/L (ref 136–145)
WBC # BLD AUTO: 10 10E3/UL (ref 4–11)

## 2023-02-15 PROCEDURE — 36591 DRAW BLOOD OFF VENOUS DEVICE: CPT

## 2023-02-15 PROCEDURE — 85025 COMPLETE CBC W/AUTO DIFF WBC: CPT

## 2023-02-15 PROCEDURE — 250N000011 HC RX IP 250 OP 636: Performed by: NURSE PRACTITIONER

## 2023-02-15 PROCEDURE — 250N000011 HC RX IP 250 OP 636: Performed by: INTERNAL MEDICINE

## 2023-02-15 PROCEDURE — 96374 THER/PROPH/DIAG INJ IV PUSH: CPT

## 2023-02-15 PROCEDURE — 99212 OFFICE O/P EST SF 10 MIN: CPT | Mod: 25

## 2023-02-15 PROCEDURE — 99215 OFFICE O/P EST HI 40 MIN: CPT

## 2023-02-15 PROCEDURE — 258N000003 HC RX IP 258 OP 636: Performed by: NURSE PRACTITIONER

## 2023-02-15 PROCEDURE — 83735 ASSAY OF MAGNESIUM: CPT

## 2023-02-15 PROCEDURE — 96361 HYDRATE IV INFUSION ADD-ON: CPT

## 2023-02-15 PROCEDURE — 80053 COMPREHEN METABOLIC PANEL: CPT

## 2023-02-15 RX ORDER — ONDANSETRON 2 MG/ML
8 INJECTION INTRAMUSCULAR; INTRAVENOUS ONCE
Status: COMPLETED | OUTPATIENT
Start: 2023-02-15 | End: 2023-02-15

## 2023-02-15 RX ORDER — ONDANSETRON 8 MG/1
8 TABLET, ORALLY DISINTEGRATING ORAL EVERY 8 HOURS PRN
Qty: 30 TABLET | Refills: 0 | Status: SHIPPED | OUTPATIENT
Start: 2023-02-15 | End: 2023-06-13

## 2023-02-15 RX ORDER — ONDANSETRON 2 MG/ML
8 INJECTION INTRAMUSCULAR; INTRAVENOUS ONCE
Status: DISCONTINUED | OUTPATIENT
Start: 2023-02-15 | End: 2023-02-15 | Stop reason: HOSPADM

## 2023-02-15 RX ORDER — HEPARIN SODIUM (PORCINE) LOCK FLUSH IV SOLN 100 UNIT/ML 100 UNIT/ML
5 SOLUTION INTRAVENOUS ONCE
Status: COMPLETED | OUTPATIENT
Start: 2023-02-15 | End: 2023-02-15

## 2023-02-15 RX ADMIN — ONDANSETRON 8 MG: 2 INJECTION INTRAMUSCULAR; INTRAVENOUS at 10:54

## 2023-02-15 RX ADMIN — Medication 5 ML: at 09:44

## 2023-02-15 RX ADMIN — Medication 5 ML: at 11:50

## 2023-02-15 RX ADMIN — SODIUM CHLORIDE 1000 ML: 9 INJECTION, SOLUTION INTRAVENOUS at 10:49

## 2023-02-15 ASSESSMENT — PAIN SCALES - GENERAL: PAINLEVEL: NO PAIN (0)

## 2023-02-15 NOTE — LETTER
Date:February 16, 2023      Provider requested that no letter be sent. Do not send.       [facility]

## 2023-02-15 NOTE — PROGRESS NOTES
Infusion Nursing Note:  Nik Nava presents today for add-on infusion.    Patient seen by provider today: Yes: Patito Mims   present during visit today: Not Applicable.    Note: Labs were monitored.    Intravenous Access:  Implanted Port.    Treatment Conditions:  Per Provider verbal order, Patient received an add-on IV fluid infusion over one hour and 8 mg IV push Zofran for nausea.    Post Infusion Assessment:  Patient tolerated infusion without incident.  Patient reported the Zofran did not help the nausea.    Discharge Plan:   Patient discharged in stable condition accompanied by: wife.      CORINE HANSEN RN

## 2023-02-15 NOTE — ADDENDUM NOTE
Addended by: ADONIS SCHAEFER on: 2/15/2023 11:01 AM     Modules accepted: Orders, Level of Service

## 2023-02-15 NOTE — LETTER
2/15/2023         RE: Nik Nava  [address] [address] [place] [id number]        Dear Colleague,    Thank you for referring your patient, Nik Nava, to the [facility] [place]. Please see a copy of my visit note below.    Infusion Nursing Note:  Nik Nava presents today for add-on infusion.    Patient seen by provider today: Yes: Patito Mims   present during visit today: Not Applicable.    Note: Labs were monitored.    Intravenous Access:  Implanted Port.    Treatment Conditions:  Per Provider verbal order, Patient received an add-on IV fluid infusion over one hour and 8 mg IV push Zofran for nausea.    Post Infusion Assessment:  Patient tolerated infusion without incident.  Patient reported the Zofran did not help the nausea.    Discharge Plan:   Patient discharged in stable condition accompanied by: wife.      CORINE HANSEN, ENRIQUE                          Again, thank you for allowing me to participate in the care of your patient.        Sincerely,        BMT Infusion Nurse

## 2023-02-15 NOTE — LETTER
2/15/2023         RE: Nik Nava  [address] [address] [id number]        Dear Colleague,    Thank you for referring your patient, Nik Nava, to the [facility] [place]. Please see a copy of my visit note below.        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    HPI:      Nik was seen today with c/o fatigue and nausea. He had COVID 1/16, s/p Paxlovid and has lingering fatigue with nausea-no emesis. Still eating, drinking okay but appetite is down. Not constipated. Some bloating.  -worsening vision but waiting for new corrective lenses. Nik's wife notes that nausea correlates with worsening vision. Nik continues to wear his old lenses until new ones arrive, hopefully within a week.    -continues on pred 5. Aside from vision changes, no c/o scratchy or dry eyes. Buccal mucosa initially worse when pred decreased to 5 but now improved. Mucosa only irritated by spicy foods. Uses dex s/s BID.   -no new medication    Review of Systems                                                                                                                                         10 point Review of systems was negative except as detailed above     PHYSICAL EXAM                                                                                                                                                   KPS: 80    /77   Pulse 81   Temp 98  F (36.7  C)   Resp 18   Wt 111.1 kg (245 lb)   SpO2 98%   BMI 32.33 kg/m      Wt Readings from Last 4 Encounters:   02/15/23 111.1 kg (245 lb)   01/12/23 113.9 kg (251 lb)   01/10/23 113.5 kg (250 lb 3.2 oz)   12/27/22 111.7 kg (246 lb 4.8 oz)      General: NAD.  HEENT: sclera anicteric, injected conjunctivae. No lichenoid changes in oral mucosa, no ulcers seen.    Ext/ Skin: no rash.  Lungs: CTA b/l  Abdomen:  Soft, NT  LE 1+ bilateral edema in lower extremities   Access: port-a-cath    cGVHD therapy started on February 24 2022  - Baseline NIH scoring 2/24/2022 : skin 2 maculopapular rash/erythema with no sclerotic features, mouth score 1 mild symptoms with lichenoid changes less than 25%, eyes score 2 moderate symptoms without new visual impairments due to KCS requiring lubricant eyedrops more than 3 times a day, overall mild scale for her provider  - 3/25/2022 1 month NIH treatment assessment on PQRST: skin 2, mouth score 1 mild symptoms with NO lichenoid changes, eyes score 2 moderate symptoms w requiring lubricant eyedrops more than 3 times a day, liver score 1 ALT 3.7x ULN and nl bilirubin   - 3/31  Mouth clear; eyes minimal LFT still abn; no joint or new GI sx  - 4/28 Mouth clear, Left>R eye is mild sclera erythema, lids are pink and irritated appearing, LFT's slow improvement, no new joint, skin, or GI symptoms.   -5/11 mouth with mild erythema but no lichenoid changes, eyes using eyedrops 4-6 times a day score of 2, LFTs significantly improved from baseline slight elevation in alk phos and AST with normal bilirubin, skin with hyperpigmentation from old acute GVHD no active skin rashes, no GI symptoms no musculoskeletal complaints.  -5/26 Mouth with very slight lichenoid changes, erythema.  Eyes still using eyedrops 4-6x per day.  LFTs essentially normal with slight elevation in alk phos.  Skin with hyperpigmentation from old acute GVHD, no active rashes, no GI or MSK concerns.  Skin 0, mouth 0 but with lichenoid changes, eyes 2  -6/7/22 Mouth with no lichenoid changes, erythema.  Eyes still using eyedrops 4-6x per day.  LFTs essentially normal with slight elevation in alk phos.  Skin with hyperpigmentation from old acute GVHD, no active rashes, no GI or MSK concerns.  Skin 0, mouth 0, eyes 2     -6 month PQRST assessment 9/6/2022: eyes 3, mouth 0 for symptoms, 25% lichenoid changes (1), liver/GI/skin  0    11/8/2022, 11/22/2022, 12/13/22 cGVHD NIG scoring eyes 3, no other organ involvement clinically    Lab:    Lab Results   Component Value Date    WBC 10.0 02/15/2023    ANEU 3.5 10/26/2021    HGB 11.5 (L) 02/15/2023    HCT 34.0 (L) 02/15/2023     02/15/2023     02/15/2023    POTASSIUM 4.5 02/15/2023    CHLORIDE 101 02/15/2023    CO2 26 02/15/2023     (H) 02/15/2023    BUN 31.3 (H) 02/15/2023    CR 1.28 (H) 02/15/2023    MAG 2.0 02/15/2023    INR 0.90 12/01/2022    BILITOTAL 0.7 02/15/2023    AST 32 02/15/2023    ALT 24 02/15/2023    ALKPHOS 94 02/15/2023    PROTTOTAL 6.6 02/15/2023    ALBUMIN 4.1 02/15/2023       ASSESSMENT AND PLAN   Nik Nava is a 62 year old male with Ph Pos ALL, 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 S/p allo sib PBSCT. (ABO matched)  HCT-CI score: 3 (prior solid tumor)  Day +100 bone marrow biopsy is 100% donor with no morphologic or flow cytometric evidence of leukemia BCR abl is pending.  - Day +180 restaging BM bx- still in CR  100% donor;  2/16/22 BCR ABL major breakpoint undetectable.   - 1 year restaging 8/18/2022: Markedly hypocellular bone marrow [less than 10% cellular] with maturing trilineage hematopoiesis and no definite morphologic or immunophenotypic evidence for involvement by B lymphoblastic leukemia. BCRABL1 PCR not detected, bone marrow % donor. FISH NORMAL No evidence of BCR-ABL1 fusion  - Maintenance with TKI posttransplantation given his Ph positive ALL that have not been started previously presumed secondary to multiple active issues specifically infections and COVID.  Per Avila Tobar: will reconsider this patient will think about whether this is something he would like to consider he was previously on Dasatinib, we would start on a low dose and  increase as tolerated.  This is on hold now pending active GVHD therapy, we discussed on this visit 10/18 in agreement with holding off.     2.  HEME:  -CBC stable    3.  FEN/Renal: Creatinine 1.3(1). 1L NS today  -nephrology following     4.    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   #Skin GVHD- history of biopsy proven GVHD of the skin 8/30/2021; resolved.   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 WNL today 11/22  # Ocular GVHD: follows with ophthalmology. Maxitrol to lids, continue refreshing drops QID. Score 3  Followed closely by Dr. Juarez and had ocular fittings 11/8 however no lens seen on exam today he will follow-up with her team for refitting   # Oral GVHD: dex s/s three-4 times a day; tac ointment to lips as needed.  Clotrimazole cream added after seeing dermatology.  Lichenoid changes have resolved with no other ulcers since 11/8     Previous therapies  Tacrolimus-previously decided to stay on same dose, no checks of levels if clinically stable, and no need switch to sirolimus. (keep tac low as gvhd is quiet and viremia). 5/10 level 45 with starting of COVID therapy and D-D intractions (Paxlovid for COVID19, which has a drug interaction with tacrolimus-Ritonavir increases serum levels of tacrolimus).   Tacrolimus level subtherapeutic, increase to 2.5 mg twice daily recently, 8/23/2022 instructed to change tacrolimus to 2 mg twice daily. 9/6 with mild NEGRA, hyperkalemia, hypomagnesemia, and reports some tremors and cramps, suspect tacrolimus to be high therefore empirically decreased to 1.5 twice daily, skip morning dose of tacrolimus and took 2 mg today after his afternoon labs. Decrease to 1mg BID on Saturday.  Sirolimus  9/21 despite fluids and a dose adjustment of tacrolimus patient seem to have persistence NORBERTO related NEGRA, therefore after discussion with the patient decision was made to transition to sirolimus that was  started on 9/21, patient initially seem to tolerate this well with normalization of kidney function  10/11 presented with flares of ocular and oral GVHD, fluid retention and gain of 5-6 kg, lower extremity edema, abdominal distention, total protein to creatinine ratio elevated at 0.4, in addition to elevation in BUN and creatinine, HTN.  Picture most consistent with sirolimus related side effects.  Less likely that the patient will tolerate even a lower level of sirolimus.  Therefore the patient was instructed to stop sirolimus, last dose on 10/10. 10/14 level 3.5 appropriately trending down.     Corticosteroids  3/1-3/16: prednisone 100mg every day  3/17 75mg every day   3/31 75 alt with 50  4/6: 75 alt with 25mg every other day  4/12 pred tapered to 60 alternating with 25mg   4/15 In setting of viruses trying to reactivate,GVHD stable: Taper 60/15mg alternating.  4/20 decrease pred further to 50/10.    4/25 taper pred to 50/0 every other day as long as symptoms stable.   5/2 Pred decrease 40/0 alternating every other day.   5/13 decrease to 30/0  5/20 decrease to 20/0 then slowly by 5 mg weekly  6/7 decrease to 10/0  Went up to 15/0 with mild increased LFTs  8/23/2022 increased to 20/0, with persistence of oral ulcers and active ocular GVHD.  Discussed with the patient the importance of stopping chewing of nicotine gums for prolonged hours as that would not help with the healing of the oral ulcers.  I discussed with the patient alternatives of nicotine patches that he will reconsider and let me know.  9/6 decreased to 15 alternating with 0  9/13 decreased to 10 alternating with 0  9/21 discontinued tacrolimus given persistent NEGRA and single kidney and start the patient on sirolimus without loading dose.  We will get a list of possible side effects and adverse events from the Pharm.D. see sirolimus section above.  10/11 GVHD flare. Sirolimus stopped. Resume prednisone 40 mg daily as of 10/12, no change with mildly  worsening eye pain 10/14  Reduce pred to 35mg 10/25 and 25mg 11/1 11/8 20 mg   11/22 15 mg  12/13/22 10 mg (plan to taper to 5mg then slow taper 1 mg per month given long pred history)    Belumosudil  Patient intolerant/with disease flares on tacrolimus and sirolimus see above.  Discussed with the patient the different options for therapy of chronic GVHD that are FDA approved.  Given the side effect profiles after discussing in detail and given the least nephrotoxicity and expected response, taking into account other metabolic effect as well.  We will proceed with prior authorization with belumosudil.  Patient was given material to review for possible expected adverse events and side effects with both Belumosodil and ruxolitinib.   --- Started on 10/18 in p.m. - feeling tired with malaise since starting and worried the drug is responsible     5.  ID: Afebrile with no current signs or symptoms of infection.  He recently was admitted following influenza and is now improved    # cat bite/scratch: doxy 100mg BID x 10 days; completes 11/3/22.  Bite has healed, no signs of infection.     #History of COVID   Positive via homed test 5/8. Treated with Paxlovid with resolution of symptoms.  Had recurrence on 5/18. Resolved  Covid 11/21, 12/21, received his influenza annual vaccine as well as COVID boosters locally 11/16     #History of pulmonary infiltrates:   4/20 CT chest with new RUL infiltrate. Highly immunocompromised. 4/22 Fungitell/asp GM were neg. BAL 4/29 NGTD, increased micafungin to 150mg IV daily-- f/u 6/1 Dr Garcia CT with mixed results, followed with Dr. Garcia August 2022 with resolution of pulmonary nodules and normalization of LFTs we will switch patient will switch patient to posaconazole prophylactic dose and monitor LFTs and any infectious concerns closely (wnl stable 10/14)     #History of CMV viremia:    - CMV viremia up to 1100. 4/15 started valcyte 900mg PO BID. 4/20 CMV <137, 5/10 ND, decreased to  450mg BID 4/30 - continue 4 weeks as long as CMV <137 till 5/28 + weekly CMV  - Switch to acyclovir after completion of current therapy 5/28. 10/11 CMV ND. Check monthly on IST, 11/8 CMV <137, 11/22 ND, recheck 12/8     - PPx:   ACV  Posaconazole (previously on micafungin with LFts abl, hx of pulmonary nodules needign treatment dose).   Pentamidine, last 11/22  Azithro 250mg daily (stopped levaquin due to possible tendonitis).      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 S/p 4/22 and 5/4/22 rituximab 375mg/m2 5/10 ND x2, 6/7 ND, 8/18/2022 971, 10/11 ND, check every other month on IST, 11/8 ND  S/p covid vaccine series 12/2021  S/p evusheld   Deferred annual vaccines in the setting of GVHD and immunosuppression therapy     6. Endo:   - Hx of graves disease; On synthroid 175 mcg daily. TSH normal 4/6/2022 no reflex to T4.  Recheck on 10/18 T3 low at 0.03, free T3 and T4 within normal.  Subclinical hyperthyroidism, 11/22/222 and discussion with endocrine, reduce the levothyroxine to 150 mcg/day and repeat labs in 2-3.  He has follow-up appointment with endocrine    - HLD: 11/2022 cholesterol 355, triglycerides 153, -- 11/8 started rosuvastatin 5 mg daily we will recheck lipid profile in 3 months, 11/22 CPK within normal     7. CV:   - Hypertension history - now on low side after weight loss  - TTE most recently 10/2022     8. GI:   -Omeprazole for heartburn  -LFTs as above, now normal. 9/6 decreased ursodiol to daily   -zofran 8mg IV x1 today; new Rx for zofran sent       9. Psych:   - Situational anxiety - lexapro 10mg daily.   - Insomnia: worse on steroids. Ativan, trazadone, melatonin     10. MSK: left food drop, PT. Occasional muscle cramps discussed optimizing vitamin D levels and considering vitamin D, some of the symptomatology of muscle cramps are likely related to chronic GVHD.     11. HCM/Age appropriate cancer screening:  -Discussed with the patient importance of age-appropriate  cancer screening including colonoscopies, he is due for this.  -Discussed importance of at least once a year vitamin D level check, 8/18/2022 wnl  -Screening for secondary iron overload, see heme section above, start vitamins on 12/13  -Yearly TSH 2022 wnl; yearly lipid panel - see GI section above  -9/6/2022 DEXA scan Based on BMD diagnosis is consistent with normal bone density based on WHO criteria Ref. 1   -PFTs 9/20/22, see above  -Metabolic other, 8/2022 testosterone within normal  -11/22/2022 discussed with the patient the challenges and the stresses of chronic illness and healthcare fatigue.  Patient had previously been on Lexapro that we restarted today confirmed with pharmacy that there are no drug drug interactions.  Additionally we will refer patient to PMNR to help with physical rehab and strength to help with fatigue.  Our social workers will also reach out to Nik and his wife for further resources including support groups for patients with chronic illness and fatigue post transplant.       Summary:  He has mild GVHD symptoms and no evidence of active inflammation. Ongoing fatigue with mild nausea. Possibly 2/2 covid vs blurry vision, awaiting corrective lenses. 1L NS and IV zofran today. Cont pred 5mg daily. Unlikely this is GVHD. Will add a TSH for next visit         RTC Dr. Cote in 2/23 as scheduled      I spent 40 minutes in the care of this patient today, which included time necessary for preparation for the visit, obtaining history, ordering medications/tests/procedures as medically indicated, review of pertinent medical literature, counseling of the patient, communication of recommendations to the care team, and documentation time.    PEE Pereira CNP

## 2023-02-15 NOTE — NURSING NOTE
"Oncology Rooming Note    February 15, 2023 10:02 AM   Nik Nava is a 62 year old male who presents for:    Chief Complaint   Patient presents with     Port Draw     Labs drawn by RN via port, vitals taken.     Oncology Clinic Visit     Post bmt for ALL here for labs and md visit     Initial Vitals: /77   Pulse 81   Temp 98  F (36.7  C)   Resp 18   Wt 111.1 kg (245 lb)   SpO2 98%   BMI 32.33 kg/m   Estimated body mass index is 32.33 kg/m  as calculated from the following:    Height as of 12/27/22: 1.854 m (6' 0.99\").    Weight as of this encounter: 111.1 kg (245 lb). Body surface area is 2.39 meters squared.  No Pain (0) Comment: Data Unavailable   No LMP for male patient.  Allergies reviewed: Yes  Medications reviewed: Yes    Medications: Medication refills not needed today.  Pharmacy name entered into [employer]:    [facility] - [place], [place] - [address] [facility] [place], [place] - [phone]  [facility]    Clinical concerns: wonders if vision is causing intermittent nausea      Stephanie Seals, RN              "

## 2023-02-15 NOTE — PROGRESS NOTES
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    HPI:      Nik was seen today with c/o fatigue and nausea. He had COVID 1/16, s/p Paxlovid and has lingering fatigue with nausea-no emesis. Still eating, drinking okay but appetite is down. Not constipated. Some bloating.  -worsening vision but waiting for new corrective lenses. Nik's wife notes that nausea correlates with worsening vision. Nik continues to wear his old lenses until new ones arrive, hopefully within a week.    -continues on pred 5. Aside from vision changes, no c/o scratchy or dry eyes. Buccal mucosa initially worse when pred decreased to 5 but now improved. Mucosa only irritated by spicy foods. Uses dex s/s BID.   -no new medication    Review of Systems                                                                                                                                         10 point Review of systems was negative except as detailed above     PHYSICAL EXAM                                                                                                                                                   KPS: 80    /77   Pulse 81   Temp 98  F (36.7  C)   Resp 18   Wt 111.1 kg (245 lb)   SpO2 98%   BMI 32.33 kg/m      Wt Readings from Last 4 Encounters:   02/15/23 111.1 kg (245 lb)   01/12/23 113.9 kg (251 lb)   01/10/23 113.5 kg (250 lb 3.2 oz)   12/27/22 111.7 kg (246 lb 4.8 oz)      General: NAD.  HEENT: sclera anicteric, injected conjunctivae. No lichenoid changes in oral mucosa, no ulcers seen.    Ext/ Skin: no rash.  Lungs: CTA b/l  Abdomen: Soft, NT  LE 1+ bilateral edema in lower extremities   Access: port-a-cath    cGVHD therapy started on February 24 2022  - Baseline NIH scoring 2/24/2022 : skin 2 maculopapular rash/erythema with no sclerotic features, mouth score 1 mild symptoms with lichenoid changes less than 25%,  eyes score 2 moderate symptoms without new visual impairments due to KCS requiring lubricant eyedrops more than 3 times a day, overall mild scale for her provider  - 3/25/2022 1 month NIH treatment assessment on PQRST: skin 2, mouth score 1 mild symptoms with NO lichenoid changes, eyes score 2 moderate symptoms w requiring lubricant eyedrops more than 3 times a day, liver score 1 ALT 3.7x ULN and nl bilirubin   - 3/31  Mouth clear; eyes minimal LFT still abn; no joint or new GI sx  - 4/28 Mouth clear, Left>R eye is mild sclera erythema, lids are pink and irritated appearing, LFT's slow improvement, no new joint, skin, or GI symptoms.   -5/11 mouth with mild erythema but no lichenoid changes, eyes using eyedrops 4-6 times a day score of 2, LFTs significantly improved from baseline slight elevation in alk phos and AST with normal bilirubin, skin with hyperpigmentation from old acute GVHD no active skin rashes, no GI symptoms no musculoskeletal complaints.  -5/26 Mouth with very slight lichenoid changes, erythema.  Eyes still using eyedrops 4-6x per day.  LFTs essentially normal with slight elevation in alk phos.  Skin with hyperpigmentation from old acute GVHD, no active rashes, no GI or MSK concerns.  Skin 0, mouth 0 but with lichenoid changes, eyes 2  -6/7/22 Mouth with no lichenoid changes, erythema.  Eyes still using eyedrops 4-6x per day.  LFTs essentially normal with slight elevation in alk phos.  Skin with hyperpigmentation from old acute GVHD, no active rashes, no GI or MSK concerns.  Skin 0, mouth 0, eyes 2     -6 month PQRST assessment 9/6/2022: eyes 3, mouth 0 for symptoms, 25% lichenoid changes (1), liver/GI/skin 0    11/8/2022, 11/22/2022, 12/13/22 cGVHD NIG scoring eyes 3, no other organ involvement clinically    Lab:    Lab Results   Component Value Date    WBC 10.0 02/15/2023    ANEU 3.5 10/26/2021    HGB 11.5 (L) 02/15/2023    HCT 34.0 (L) 02/15/2023     02/15/2023     02/15/2023     POTASSIUM 4.5 02/15/2023    CHLORIDE 101 02/15/2023    CO2 26 02/15/2023     (H) 02/15/2023    BUN 31.3 (H) 02/15/2023    CR 1.28 (H) 02/15/2023    MAG 2.0 02/15/2023    INR 0.90 12/01/2022    BILITOTAL 0.7 02/15/2023    AST 32 02/15/2023    ALT 24 02/15/2023    ALKPHOS 94 02/15/2023    PROTTOTAL 6.6 02/15/2023    ALBUMIN 4.1 02/15/2023       ASSESSMENT AND PLAN   Nik Nava is a 62 year old male with Ph Pos ALL, 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  Acute lymphoblastic leukemia, [place] chromosome positive in CR, MRD neg. S/p allo sib PBSCT. (ABO matched)  HCT-CI score: 3 (prior solid tumor)  Day +100 bone marrow biopsy is 100% donor with no morphologic or flow cytometric evidence of leukemia BCR abl is pending.  - Day +180 restaging BM bx- still in CR  100% donor;  2/16/22 BCR ABL major breakpoint undetectable.   - 1 year restaging 8/18/2022: Markedly hypocellular bone marrow [less than 10% cellular] with maturing trilineage hematopoiesis and no definite morphologic or immunophenotypic evidence for involvement by B lymphoblastic leukemia. BCRABL1 PCR not detected, bone marrow % donor. FISH NORMAL No evidence of BCR-ABL1 fusion  - Maintenance with TKI posttransplantation given his Ph positive ALL that have not been started previously presumed secondary to multiple active issues specifically infections and COVID.  Per Avila Tobar: will reconsider this patient will think about whether this is something he would like to consider he was previously on Dasatinib, we would start on a low dose and increase as tolerated.  This is on hold now pending active GVHD therapy, we discussed on this visit 10/18 in agreement with holding off.     2.  HEME:  -CBC stable    3.  FEN/Renal: Creatinine 1.3(1). 1L NS today  -nephrology following     4.    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   #Skin GVHD- history of biopsy proven GVHD of the skin 8/30/2021; resolved.   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 WNL today 11/22  # Ocular GVHD: follows with ophthalmology. Maxitrol to lids, continue refreshing drops QID. Score 3  Followed closely by Dr. Juarez and had ocular fittings 11/8 however no lens seen on exam today he will follow-up with her team for refitting   # Oral GVHD: dex s/s three-4 times a day; tac ointment to lips as needed.  Clotrimazole cream added after seeing dermatology.  Lichenoid changes have resolved with no other ulcers since 11/8     Previous therapies  Tacrolimus-previously decided to stay on same dose, no checks of levels if clinically stable, and no need switch to sirolimus. (keep tac low as gvhd is quiet and viremia). 5/10 level 45 with starting of COVID therapy and D-D intractions (Paxlovid for COVID19, which has a drug interaction with tacrolimus-Ritonavir increases serum levels of tacrolimus).   Tacrolimus level subtherapeutic, increase to 2.5 mg twice daily recently, 8/23/2022 instructed to change tacrolimus to 2 mg twice daily. 9/6 with mild NEGRA, hyperkalemia, hypomagnesemia, and reports some tremors and cramps, suspect tacrolimus to be high therefore empirically decreased to 1.5 twice daily, skip morning dose of tacrolimus and took 2 mg today after his afternoon labs. Decrease to 1mg BID on Saturday.  Sirolimus  9/21 despite fluids and a dose adjustment of tacrolimus patient seem to have persistence NORBERTO related NEGRA, therefore after discussion with the patient decision was made to transition to sirolimus that was started on 9/21, patient initially seem to tolerate this well with normalization of kidney function  10/11 presented with flares of ocular and oral GVHD, fluid retention and gain of 5-6 kg, lower extremity edema, abdominal distention, total protein to creatinine ratio elevated at 0.4, in  addition to elevation in BUN and creatinine, HTN.  Picture most consistent with sirolimus related side effects.  Less likely that the patient will tolerate even a lower level of sirolimus.  Therefore the patient was instructed to stop sirolimus, last dose on 10/10. 10/14 level 3.5 appropriately trending down.     Corticosteroids  3/1-3/16: prednisone 100mg every day  3/17 75mg every day   3/31 75 alt with 50  4/6: 75 alt with 25mg every other day  4/12 pred tapered to 60 alternating with 25mg   4/15 In setting of viruses trying to reactivate,GVHD stable: Taper 60/15mg alternating.  4/20 decrease pred further to 50/10.    4/25 taper pred to 50/0 every other day as long as symptoms stable.   5/2 Pred decrease 40/0 alternating every other day.   5/13 decrease to 30/0  5/20 decrease to 20/0 then slowly by 5 mg weekly  6/7 decrease to 10/0  Went up to 15/0 with mild increased LFTs  8/23/2022 increased to 20/0, with persistence of oral ulcers and active ocular GVHD.  Discussed with the patient the importance of stopping chewing of nicotine gums for prolonged hours as that would not help with the healing of the oral ulcers.  I discussed with the patient alternatives of nicotine patches that he will reconsider and let me know.  9/6 decreased to 15 alternating with 0  9/13 decreased to 10 alternating with 0  9/21 discontinued tacrolimus given persistent NEGRA and single kidney and start the patient on sirolimus without loading dose.  We will get a list of possible side effects and adverse events from the Pharm.D. see sirolimus section above.  10/11 GVHD flare. Sirolimus stopped. Resume prednisone 40 mg daily as of 10/12, no change with mildly worsening eye pain 10/14  Reduce pred to 35mg 10/25 and 25mg 11/1 11/8 20 mg   11/22 15 mg  12/13/22 10 mg (plan to taper to 5mg then slow taper 1 mg per month given long pred history)    Belumosudil  Patient intolerant/with disease flares on tacrolimus and sirolimus see above.   Discussed with the patient the different options for therapy of chronic GVHD that are FDA approved.  Given the side effect profiles after discussing in detail and given the least nephrotoxicity and expected response, taking into account other metabolic effect as well.  We will proceed with prior authorization with belumosudil.  Patient was given material to review for possible expected adverse events and side effects with both Belumosodil and ruxolitinib.   --- Started on 10/18 in p.m. - feeling tired with malaise since starting and worried the drug is responsible     5.  ID: Afebrile with no current signs or symptoms of infection.  He recently was admitted following influenza and is now improved    # cat bite/scratch: doxy 100mg BID x 10 days; completes 11/3/22.  Bite has healed, no signs of infection.     #History of COVID   Positive via homed test 5/8. Treated with Paxlovid with resolution of symptoms.  Had recurrence on 5/18. Resolved  Covid 11/21, 12/21, received his influenza annual vaccine as well as COVID boosters locally 11/16     #History of pulmonary infiltrates:   4/20 CT chest with new RUL infiltrate. Highly immunocompromised. 4/22 Fungitell/asp GM were neg. BAL 4/29 NGTD, increased micafungin to 150mg IV daily-- f/u 6/1 Dr Garcia CT with mixed results, followed with Dr. Garcia August 2022 with resolution of pulmonary nodules and normalization of LFTs we will switch patient will switch patient to posaconazole prophylactic dose and monitor LFTs and any infectious concerns closely (wnl stable 10/14)     #History of CMV viremia:    - CMV viremia up to 1100. 4/15 started valcyte 900mg PO BID. 4/20 CMV <137, 5/10 ND, decreased to 450mg BID 4/30 - continue 4 weeks as long as CMV <137 till 5/28 + weekly CMV  - Switch to acyclovir after completion of current therapy 5/28. 10/11 CMV ND. Check monthly on IST, 11/8 CMV <137, 11/22 ND, recheck 12/8     - PPx:   ACV  Posaconazole (previously on micafungin with LFts  abl, hx of pulmonary nodules needign treatment dose).   Pentamidine, last 11/22  Azithro 250mg daily (stopped levaquin due to possible tendonitis).      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 S/p 4/22 and 5/4/22 rituximab 375mg/m2 5/10 ND x2, 6/7 ND, 8/18/2022 971, 10/11 ND, check every other month on IST, 11/8 ND  S/p covid vaccine series 12/2021  S/p shyamusheld   Deferred annual vaccines in the setting of GVHD and immunosuppression therapy     6. Endo:   - Hx of graves disease; On synthroid 175 mcg daily. TSH normal 4/6/2022 no reflex to T4.  Recheck on 10/18 T3 low at 0.03, free T3 and T4 within normal.  Subclinical hyperthyroidism, 11/22/222 and discussion with endocrine, reduce the levothyroxine to 150 mcg/day and repeat labs in 2-3.  He has follow-up appointment with endocrine    - HLD: 11/2022 cholesterol 355, triglycerides 153, -- 11/8 started rosuvastatin 5 mg daily we will recheck lipid profile in 3 months, 11/22 CPK within normal     7. CV:   - Hypertension history - now on low side after weight loss  - TTE most recently 10/2022     8. GI:   -Omeprazole for heartburn  -LFTs as above, now normal. 9/6 decreased ursodiol to daily   -zofran 8mg IV x1 today; new Rx for zofran sent       9. Psych:   - Situational anxiety - lexapro 10mg daily.   - Insomnia: worse on steroids. Ativan, trazadone, melatonin     10. MSK: left food drop, PT. Occasional muscle cramps discussed optimizing vitamin D levels and considering vitamin D, some of the symptomatology of muscle cramps are likely related to chronic GVHD.     11. HCM/Age appropriate cancer screening:  -Discussed with the patient importance of age-appropriate cancer screening including colonoscopies, he is due for this.  -Discussed importance of at least once a year vitamin D level check, 8/18/2022 wnl  -Screening for secondary iron overload, see heme section above, start vitamins on 12/13  -Yearly TSH 2022 wnl; yearly lipid panel - see GI  section above  -9/6/2022 DEXA scan Based on BMD diagnosis is consistent with normal bone density based on WHO criteria Ref. 1   -PFTs 9/20/22, see above  -Metabolic other, 8/2022 testosterone within normal  -11/22/2022 discussed with the patient the challenges and the stresses of chronic illness and healthcare fatigue.  Patient had previously been on Lexapro that we restarted today confirmed with pharmacy that there are no drug drug interactions.  Additionally we will refer patient to PMNR to help with physical rehab and strength to help with fatigue.  Our social workers will also reach out to Nik and his wife for further resources including support groups for patients with chronic illness and fatigue post transplant.       Summary:  He has mild GVHD symptoms and no evidence of active inflammation. Ongoing fatigue with mild nausea. Possibly 2/2 covid vs blurry vision, awaiting corrective lenses. 1L NS and IV zofran today. Cont pred 5mg daily. Unlikely this is GVHD. Will add a TSH for next visit         RTC Dr. Cote in 2/23 as scheduled      I spent 40 minutes in the care of this patient today, which included time necessary for preparation for the visit, obtaining history, ordering medications/tests/procedures as medically indicated, review of pertinent medical literature, counseling of the patient, communication of recommendations to the care team, and documentation time.    PEE Pereira CNP

## 2023-02-23 ENCOUNTER — VIRTUAL VISIT (OUTPATIENT)
Dept: TRANSPLANT | Facility: CLINIC | Age: 63
End: 2023-02-23
Attending: INTERNAL MEDICINE
Payer: COMMERCIAL

## 2023-02-23 VITALS — WEIGHT: 239 LBS | HEIGHT: 73 IN | BODY MASS INDEX: 31.68 KG/M2

## 2023-02-23 DIAGNOSIS — C91.01 ACUTE LYMPHOBLASTIC LEUKEMIA (ALL) IN REMISSION (H): Primary | ICD-10-CM

## 2023-02-23 PROCEDURE — 99215 OFFICE O/P EST HI 40 MIN: CPT | Mod: VID | Performed by: INTERNAL MEDICINE

## 2023-02-23 RX ORDER — ACYCLOVIR 800 MG/1
800 TABLET ORAL 2 TIMES DAILY
Qty: 60 TABLET | Refills: 1 | Status: SHIPPED | OUTPATIENT
Start: 2023-02-23 | End: 2023-05-02

## 2023-02-23 RX ORDER — PREDNISONE 1 MG/1
4 TABLET ORAL DAILY
Qty: 120 TABLET | Refills: 1 | Status: SHIPPED | OUTPATIENT
Start: 2023-02-23 | End: 2023-03-06

## 2023-02-23 ASSESSMENT — PAIN SCALES - GENERAL: PAINLEVEL: NO PAIN (0)

## 2023-02-23 NOTE — LETTER
2/23/2023         RE: Nik Nava  [address] [facility] [id number]        Dear Colleague,    Thank you for referring your patient, Nik Nava, to the [facility] [place]. Please see a copy of my visit note below.        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    HPI:    Nik returns to clinic with ongoing issues of fatigue, intermittent nausea, and little ambition or energy to want to get out and walk or be busy.  He has been treated for chronic dcgso-vpfubr-qzcy disease, and in mid January shortly after I saw him last, he developed COVID.  He did take Paxlovid which improved his symptoms quickly within a few days, but then after discontinuing Paxlovid, he began to feel worse again.  The fatigue and malaise have persisted since that time now over the last couple weeks.  He was in clinic last week and was given some IV fluids.  He does feel like he is eating and drinking well.  When I last saw him, we lowered his prednisone to 5 mg/day from 10 mg.  I also had him lower the dexamethasone from 5 times a day to twice a day.  He did feel his mouth was little more red but really did not feel any differently with the change and now that appearance has resolved.  He had attempted to take vitamin E which she had been on for some time but wondered whether this is making him sick and he discontinued it.  He is not having fevers, he has no respiratory symptoms, he does not feel his chronic GVH is any better or worse than before based on the symptom of his mouth, and eyes.  He has no skin changes.  His bowel habits are unchanged.  He has not lost weight and continues to eat well.    Review of Systems                                                                                                                                         10 point Review of  systems was negative except as detailed above     PHYSICAL EXAM                                                                                                                                                   KPS: 80    There were no vitals taken for this visit.    Wt Readings from Last 4 Encounters:   02/15/23 111.1 kg (245 lb)   01/12/23 113.9 kg (251 lb)   01/10/23 113.5 kg (250 lb 3.2 oz)   12/27/22 111.7 kg (246 lb 4.8 oz)      General: NAD.      cGVHD therapy started on February 24 2022  - Baseline NIH scoring 2/24/2022 : skin 2 maculopapular rash/erythema with no sclerotic features, mouth score 1 mild symptoms with lichenoid changes less than 25%, eyes score 2 moderate symptoms without new visual impairments due to KCS requiring lubricant eyedrops more than 3 times a day, overall mild scale for her provider  - 3/25/2022 1 month NIH treatment assessment on PQRST: skin 2, mouth score 1 mild symptoms with NO lichenoid changes, eyes score 2 moderate symptoms w requiring lubricant eyedrops more than 3 times a day, liver score 1 ALT 3.7x ULN and nl bilirubin   - 3/31  Mouth clear; eyes minimal LFT still abn; no joint or new GI sx  - 4/28 Mouth clear, Left>R eye is mild sclera erythema, lids are pink and irritated appearing, LFT's slow improvement, no new joint, skin, or GI symptoms.   -5/11 mouth with mild erythema but no lichenoid changes, eyes using eyedrops 4-6 times a day score of 2, LFTs significantly improved from baseline slight elevation in alk phos and AST with normal bilirubin, skin with hyperpigmentation from old acute GVHD no active skin rashes, no GI symptoms no musculoskeletal complaints.  -5/26 Mouth with very slight lichenoid changes, erythema.  Eyes still using eyedrops 4-6x per day.  LFTs essentially normal with slight elevation in alk phos.  Skin with hyperpigmentation from old acute GVHD, no active rashes, no GI or MSK concerns.  Skin 0, mouth 0 but with lichenoid changes, eyes 2  -6/7/22 Mouth  with no lichenoid changes, erythema.  Eyes still using eyedrops 4-6x per day.  LFTs essentially normal with slight elevation in alk phos.  Skin with hyperpigmentation from old acute GVHD, no active rashes, no GI or MSK concerns.  Skin 0, mouth 0, eyes 2     -6 month PQRST assessment 9/6/2022: eyes 3, mouth 0 for symptoms, 25% lichenoid changes (1), liver/GI/skin 0    11/8/2022, 11/22/2022, 12/13/22 cGVHD NIG scoring eyes 3, no other organ involvement clinically    Lab:    Lab Results   Component Value Date    WBC 10.0 02/15/2023    ANEU 3.5 10/26/2021    HGB 11.5 (L) 02/15/2023    HCT 34.0 (L) 02/15/2023     02/15/2023     02/15/2023    POTASSIUM 4.5 02/15/2023    CHLORIDE 101 02/15/2023    CO2 26 02/15/2023     (H) 02/15/2023    BUN 31.3 (H) 02/15/2023    CR 1.28 (H) 02/15/2023    MAG 2.0 02/15/2023    INR 0.90 12/01/2022    BILITOTAL 0.7 02/15/2023    AST 32 02/15/2023    ALT 24 02/15/2023    ALKPHOS 94 02/15/2023    PROTTOTAL 6.6 02/15/2023    ALBUMIN 4.1 02/15/2023       ASSESSMENT AND PLAN   Nik Nava is a 62 year old male with Ph Pos ALL, day 562 s/p sib allo stem cell transplant    Day -6 (8/4): flu/cy  Day -5 through day -2 (8/5-8/8): flu  Day -1 (8/9): TBI  Day 0 (8/10): transplant     1.  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 S/p allo sib PBSCT. (ABO matched)  HCT-CI score: 3 (prior solid tumor)  Day +100 bone marrow biopsy is 100% donor with no morphologic or flow cytometric evidence of leukemia BCR abl is pending.  - Day +180 restaging BM bx- still in CR  100% donor;  2/16/22 BCR ABL major breakpoint undetectable.   - 1 year restaging 8/18/2022: Markedly hypocellular bone marrow [less than 10% cellular] with maturing trilineage hematopoiesis and no definite morphologic or immunophenotypic evidence for involvement by B lymphoblastic leukemia. BCRABL1 PCR not detected, bone marrow % donor. FISH NORMAL No evidence of BCR-ABL1  fusion  - Maintenance with TKI posttransplantation given his Ph positive ALL that have not been started previously presumed secondary to multiple active issues specifically infections and COVID.  Per Avila Tobar: will reconsider this patient will think about whether this is something he would like to consider he was previously on Dasatinib, we would start on a low dose and increase as tolerated.  This is on hold now pending active GVHD therapy, we discussed on this visit 10/18 in agreement with holding off.     2.  HEME:  -CBC stable    3.  FEN/Renal: Creatinine 1.3(1). 1L NS today  -nephrology following     4.    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   #Skin GVHD- history of biopsy proven GVHD of the skin 8/30/2021; resolved.   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 WNL today 11/22  # Ocular GVHD: follows with ophthalmology. Maxitrol to lids, continue refreshing drops QID. Score 3  Followed closely by Dr. Juarez and had ocular fittings 11/8 however no lens seen on exam today he will follow-up with her team for refitting   # Oral GVHD: dex s/s twice a day; tac ointment to lips as needed.  Clotrimazole cream added after seeing dermatology.  Lichenoid changes have resolved with no other ulcers since 11/8     Previous therapies  Tacrolimus-previously decided to stay on same dose, no checks of levels if clinically stable, and no need switch to sirolimus. (keep tac low as gvhd is quiet and viremia). 5/10 level 45 with starting of COVID therapy and D-D intractions (Paxlovid for COVID19, which has a drug interaction with tacrolimus-Ritonavir increases serum levels of tacrolimus).   Tacrolimus level subtherapeutic, increase to 2.5 mg twice daily recently, 8/23/2022 instructed to change tacrolimus to 2 mg twice daily. 9/6 with mild NEGRA, hyperkalemia, hypomagnesemia, and reports some tremors and cramps, suspect tacrolimus to be high therefore  empirically decreased to 1.5 twice daily, skip morning dose of tacrolimus and took 2 mg today after his afternoon labs. Decrease to 1mg BID on Saturday.  Sirolimus  9/21 despite fluids and a dose adjustment of tacrolimus patient seem to have persistence NORBERTO related NEGRA, therefore after discussion with the patient decision was made to transition to sirolimus that was started on 9/21, patient initially seem to tolerate this well with normalization of kidney function  10/11 presented with flares of ocular and oral GVHD, fluid retention and gain of 5-6 kg, lower extremity edema, abdominal distention, total protein to creatinine ratio elevated at 0.4, in addition to elevation in BUN and creatinine, HTN.  Picture most consistent with sirolimus related side effects.  Less likely that the patient will tolerate even a lower level of sirolimus.  Therefore the patient was instructed to stop sirolimus, last dose on 10/10. 10/14 level 3.5 appropriately trending down.     Corticosteroids  3/1-3/16: prednisone 100mg every day  3/17 75mg every day   3/31 75 alt with 50  4/6: 75 alt with 25mg every other day  4/12 pred tapered to 60 alternating with 25mg   4/15 In setting of viruses trying to reactivate,GVHD stable: Taper 60/15mg alternating.  4/20 decrease pred further to 50/10.    4/25 taper pred to 50/0 every other day as long as symptoms stable.   5/2 Pred decrease 40/0 alternating every other day.   5/13 decrease to 30/0  5/20 decrease to 20/0 then slowly by 5 mg weekly  6/7 decrease to 10/0  Went up to 15/0 with mild increased LFTs  8/23/2022 increased to 20/0, with persistence of oral ulcers and active ocular GVHD.  Discussed with the patient the importance of stopping chewing of nicotine gums for prolonged hours as that would not help with the healing of the oral ulcers.  I discussed with the patient alternatives of nicotine patches that he will reconsider and let me know.  9/6 decreased to 15 alternating with 0  9/13  decreased to 10 alternating with 0  9/21 discontinued tacrolimus given persistent NEGRA and single kidney and start the patient on sirolimus without loading dose.  We will get a list of possible side effects and adverse events from the Pharm.D. see sirolimus section above.  10/11 GVHD flare. Sirolimus stopped. Resume prednisone 40 mg daily as of 10/12, no change with mildly worsening eye pain 10/14  Reduce pred to 35mg 10/25 and 25mg 11/1 11/8 20 mg   11/22 15 mg  12/13/22 10 mg (plan to taper to 5mg then slow taper 1 mg per month given long pred history)  2/23/23 lower from 5 mg to 4 mg for the next month    Belumosudil  Patient intolerant/with disease flares on tacrolimus and sirolimus see above.  Discussed with the patient the different options for therapy of chronic GVHD that are FDA approved.  Given the side effect profiles after discussing in detail and given the least nephrotoxicity and expected response, taking into account other metabolic effect as well.  We will proceed with prior authorization with belumosudil.  Patient was given material to review for possible expected adverse events and side effects with both Belumosodil and ruxolitinib.   --- Started on 10/18 in p.m. - feeling tired with malaise since starting and worried the drug is responsible     5.  ID: Afebrile with no current signs or symptoms of infection.  He recently was admitted following influenza and is now improved    # cat bite/scratch: doxy 100mg BID x 10 days; completes 11/3/22.  Bite has healed, no signs of infection.     #History of COVID   Positive via homed test 5/8. Treated with Paxlovid with resolution of symptoms.  Had recurrence on 5/18. Resolved  Covid 11/21, 12/21, received his influenza annual vaccine as well as COVID boosters locally 11/16     #History of pulmonary infiltrates:   4/20 CT chest with new RUL infiltrate. Highly immunocompromised. 4/22 Fungitell/asp GM were neg. BAL 4/29 NGTD, increased micafungin to 150mg IV  daily-- f/u 6/1 Dr Garcia CT with mixed results, followed with Dr. Garcia August 2022 with resolution of pulmonary nodules and normalization of LFTs we will switch patient will switch patient to posaconazole prophylactic dose and monitor LFTs and any infectious concerns closely (wnl stable 10/14)     #History of CMV viremia:    - CMV viremia up to 1100. 4/15 started valcyte 900mg PO BID. 4/20 CMV <137, 5/10 ND, decreased to 450mg BID 4/30 - continue 4 weeks as long as CMV <137 till 5/28 + weekly CMV  - Switch to acyclovir after completion of current therapy 5/28. 10/11 CMV ND. Check monthly on IST, 11/8 CMV <137, 11/22 ND, recheck 12/8     - PPx:   ACV  Posaconazole (previously on micafungin with LFts abl, hx of pulmonary nodules needign treatment dose).   Pentamidine, last 11/22  Azithro 250mg daily (stopped levaquin due to possible tendonitis).      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 S/p 4/22 and 5/4/22 rituximab 375mg/m2 5/10 ND x2, 6/7 ND, 8/18/2022 971, 10/11 ND, check every other month on IST, 11/8 ND  S/p covid vaccine series 12/2021  S/p evusheld   Deferred annual vaccines in the setting of GVHD and immunosuppression therapy     6. Endo:   - Hx of graves disease; On synthroid 175 mcg daily. TSH normal 4/6/2022 no reflex to T4.  Recheck on 10/18 T3 low at 0.03, free T3 and T4 within normal.  Subclinical hyperthyroidism, 11/22/222 and discussion with endocrine, reduce the levothyroxine to 150 mcg/day and repeat labs in 2-3.  He has follow-up appointment with endocrine    - HLD: 11/2022 cholesterol 355, triglycerides 153, -- 11/8 started rosuvastatin 5 mg daily we will recheck lipid profile in 3 months, 11/22 CPK within normal     7. CV:   - Hypertension history - now on low side after weight loss  - TTE most recently 10/2022     8. GI:   -Omeprazole for heartburn  -LFTs as above, now normal. 9/6 decreased ursodiol to daily   -zofran 8mg IV x1 today; new Rx for zofran sent       9.  Psych:   - Situational anxiety - lexapro 10mg daily.   - Insomnia: worse on steroids. Ativan, trazadone, melatonin     10. MSK: left food drop, PT. Occasional muscle cramps discussed optimizing vitamin D levels and considering vitamin D, some of the symptomatology of muscle cramps are likely related to chronic GVHD.     11. HCM/Age appropriate cancer screening:  -Discussed with the patient importance of age-appropriate cancer screening including colonoscopies, he is due for this.  -Discussed importance of at least once a year vitamin D level check, 8/18/2022 wnl  -Screening for secondary iron overload, see heme section above, start vitamins on 12/13  -Yearly TSH 2022 wnl; yearly lipid panel - see GI section above  -9/6/2022 DEXA scan Based on BMD diagnosis is consistent with normal bone density based on WHO criteria Ref. 1   -PFTs 9/20/22, see above  -Metabolic other, 8/2022 testosterone within normal  -11/22/2022 discussed with the patient the challenges and the stresses of chronic illness and healthcare fatigue.  Patient had previously been on Lexapro that we restarted today confirmed with pharmacy that there are no drug drug interactions.  Additionally we will refer patient to PMNR to help with physical rehab and strength to help with fatigue.  Our social workers will also reach out to Nik and his wife for further resources including support groups for patients with chronic illness and fatigue post transplant.     Summary: I am unsure why he has the fatigue and nausea although this has been ongoing since he developed COVID.  Although he dates this to COVID, and the notes he does describe feeling this way for some time and also attributes some of the change to when he began belumosidil.  He also is taking other medications such as posaconazole and azithromycin that may be contributing and more noticeable now that the prednisone has been decreased.  Lastly, we did taper his prednisone 4 weeks ago from 10 to 5 mg  and this could be a contributing factor as well.  I recommended that we stick with the plan at this point and continue the prednisone taper.       Plan  - omeprazole twice daily for 7 days, then go back to daily  - restart vitamin E or flax seed oil  - taper prednisone to 4 mg daily  - continue azithro and posa for now  - decrease dex s/s to daily  - RTC 4 weeks with Dr. Avila Tobar if she is back  - consider EGD if symptoms do not improve    I spent 40 minutes in the care of this patient today, which included time necessary for preparation for the visit, obtaining history, ordering medications/tests/procedures as medically indicated, review of pertinent medical literature, counseling of the patient, communication of recommendations to the care team, and documentation time.    DOMINIQUE BRITTON MD  February 23, 2023              Nik is a 62 year old who is being evaluated via a billable video visit.      How would you like to obtain your AVS? MyChart  If the video visit is dropped, the invitation should be resent by: Send to e-mail at: [email or web address]  Will anyone else be joining your video visit? Yes-wife will join         Shima Garibay        Video-Visit Details    Type of service:  Video Visit     Originating Location (pt. Location): Home    Distant Location (provider location):  Off-site  Platform used for Video Visit: AmWellZofran 10 MG dissolve on tongue every 8hrs .     Shima Garibay

## 2023-02-23 NOTE — PROGRESS NOTES
Nik is a 62 year old who is being evaluated via a billable video visit.      How would you like to obtain your AVS? MyChart  If the video visit is dropped, the invitation should be resent by: Send to e-mail at: [email or web address]  Will anyone else be joining your video visit? Yes-wife will join         Shima Garibay        Video-Visit Details    Type of service:  Video Visit     Originating Location (pt. Location): Home    Distant Location (provider location):  Off-site  Platform used for Video Visit: AmWellZofran 10 MG dissolve on tongue every 8hrs .     Shima Garibay

## 2023-02-23 NOTE — PROGRESS NOTES
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    HPI:    Nik returns to clinic with ongoing issues of fatigue, intermittent nausea, and little ambition or energy to want to get out and walk or be busy.  He has been treated for chronic pacnj-vxkvek-dljz disease, and in mid January shortly after I saw him last, he developed COVID.  He did take Paxlovid which improved his symptoms quickly within a few days, but then after discontinuing Paxlovid, he began to feel worse again.  The fatigue and malaise have persisted since that time now over the last couple weeks.  He was in clinic last week and was given some IV fluids.  He does feel like he is eating and drinking well.  When I last saw him, we lowered his prednisone to 5 mg/day from 10 mg.  I also had him lower the dexamethasone from 5 times a day to twice a day.  He did feel his mouth was little more red but really did not feel any differently with the change and now that appearance has resolved.  He had attempted to take vitamin E which she had been on for some time but wondered whether this is making him sick and he discontinued it.  He is not having fevers, he has no respiratory symptoms, he does not feel his chronic GVH is any better or worse than before based on the symptom of his mouth, and eyes.  He has no skin changes.  His bowel habits are unchanged.  He has not lost weight and continues to eat well.    Review of Systems                                                                                                                                         10 point Review of systems was negative except as detailed above     PHYSICAL EXAM                                                                                                                                                   KPS: 80    There were no vitals taken for this visit.    Wt Readings from Last  4 Encounters:   02/15/23 111.1 kg (245 lb)   01/12/23 113.9 kg (251 lb)   01/10/23 113.5 kg (250 lb 3.2 oz)   12/27/22 111.7 kg (246 lb 4.8 oz)      General: NAD.      cGVHD therapy started on February 24 2022  - Baseline NIH scoring 2/24/2022 : skin 2 maculopapular rash/erythema with no sclerotic features, mouth score 1 mild symptoms with lichenoid changes less than 25%, eyes score 2 moderate symptoms without new visual impairments due to KCS requiring lubricant eyedrops more than 3 times a day, overall mild scale for her provider  - 3/25/2022 1 month NIH treatment assessment on PQRST: skin 2, mouth score 1 mild symptoms with NO lichenoid changes, eyes score 2 moderate symptoms w requiring lubricant eyedrops more than 3 times a day, liver score 1 ALT 3.7x ULN and nl bilirubin   - 3/31  Mouth clear; eyes minimal LFT still abn; no joint or new GI sx  - 4/28 Mouth clear, Left>R eye is mild sclera erythema, lids are pink and irritated appearing, LFT's slow improvement, no new joint, skin, or GI symptoms.   -5/11 mouth with mild erythema but no lichenoid changes, eyes using eyedrops 4-6 times a day score of 2, LFTs significantly improved from baseline slight elevation in alk phos and AST with normal bilirubin, skin with hyperpigmentation from old acute GVHD no active skin rashes, no GI symptoms no musculoskeletal complaints.  -5/26 Mouth with very slight lichenoid changes, erythema.  Eyes still using eyedrops 4-6x per day.  LFTs essentially normal with slight elevation in alk phos.  Skin with hyperpigmentation from old acute GVHD, no active rashes, no GI or MSK concerns.  Skin 0, mouth 0 but with lichenoid changes, eyes 2  -6/7/22 Mouth with no lichenoid changes, erythema.  Eyes still using eyedrops 4-6x per day.  LFTs essentially normal with slight elevation in alk phos.  Skin with hyperpigmentation from old acute GVHD, no active rashes, no GI or MSK concerns.  Skin 0, mouth 0, eyes 2     -6 month PQRST assessment  9/6/2022: eyes 3, mouth 0 for symptoms, 25% lichenoid changes (1), liver/GI/skin 0    11/8/2022, 11/22/2022, 12/13/22 cGVHD NIG scoring eyes 3, no other organ involvement clinically    Lab:    Lab Results   Component Value Date    WBC 10.0 02/15/2023    ANEU 3.5 10/26/2021    HGB 11.5 (L) 02/15/2023    HCT 34.0 (L) 02/15/2023     02/15/2023     02/15/2023    POTASSIUM 4.5 02/15/2023    CHLORIDE 101 02/15/2023    CO2 26 02/15/2023     (H) 02/15/2023    BUN 31.3 (H) 02/15/2023    CR 1.28 (H) 02/15/2023    MAG 2.0 02/15/2023    INR 0.90 12/01/2022    BILITOTAL 0.7 02/15/2023    AST 32 02/15/2023    ALT 24 02/15/2023    ALKPHOS 94 02/15/2023    PROTTOTAL 6.6 02/15/2023    ALBUMIN 4.1 02/15/2023       ASSESSMENT AND PLAN   Nik Nava is a 62 year old male with Ph Pos ALL, day 562 s/p sib allo stem cell transplant    Day -6 (8/4): flu/cy  Day -5 through day -2 (8/5-8/8): flu  Day -1 (8/9): TBI  Day 0 (8/10): transplant     1.  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 S/p allo sib PBSCT. (ABO matched)  HCT-CI score: 3 (prior solid tumor)  Day +100 bone marrow biopsy is 100% donor with no morphologic or flow cytometric evidence of leukemia BCR abl is pending.  - Day +180 restaging BM bx- still in CR  100% donor;  2/16/22 BCR ABL major breakpoint undetectable.   - 1 year restaging 8/18/2022: Markedly hypocellular bone marrow [less than 10% cellular] with maturing trilineage hematopoiesis and no definite morphologic or immunophenotypic evidence for involvement by B lymphoblastic leukemia. BCRABL1 PCR not detected, bone marrow % donor. FISH NORMAL No evidence of BCR-ABL1 fusion  - Maintenance with TKI posttransplantation given his Ph positive ALL that have not been started previously presumed secondary to multiple active issues specifically infections and COVID.  Per Avila Tobar: will reconsider this patient will think about whether this is something he would  like to consider he was previously on Dasatinib, we would start on a low dose and increase as tolerated.  This is on hold now pending active GVHD therapy, we discussed on this visit 10/18 in agreement with holding off.     2.  HEME:  -CBC stable    3.  FEN/Renal: Creatinine 1.3(1). 1L NS today  -nephrology following     4.    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   #Skin GVHD- history of biopsy proven GVHD of the skin 8/30/2021; resolved.   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 WNL today 11/22  # Ocular GVHD: follows with ophthalmology. Maxitrol to lids, continue refreshing drops QID. Score 3  Followed closely by Dr. Juarez and had ocular fittings 11/8 however no lens seen on exam today he will follow-up with her team for refitting   # Oral GVHD: dex s/s twice a day; tac ointment to lips as needed.  Clotrimazole cream added after seeing dermatology.  Lichenoid changes have resolved with no other ulcers since 11/8     Previous therapies  Tacrolimus-previously decided to stay on same dose, no checks of levels if clinically stable, and no need switch to sirolimus. (keep tac low as gvhd is quiet and viremia). 5/10 level 45 with starting of COVID therapy and D-D intractions (Paxlovid for COVID19, which has a drug interaction with tacrolimus-Ritonavir increases serum levels of tacrolimus).   Tacrolimus level subtherapeutic, increase to 2.5 mg twice daily recently, 8/23/2022 instructed to change tacrolimus to 2 mg twice daily. 9/6 with mild NEGRA, hyperkalemia, hypomagnesemia, and reports some tremors and cramps, suspect tacrolimus to be high therefore empirically decreased to 1.5 twice daily, skip morning dose of tacrolimus and took 2 mg today after his afternoon labs. Decrease to 1mg BID on Saturday.  Sirolimus  9/21 despite fluids and a dose adjustment of tacrolimus patient seem to have persistence NORBERTO related NEGRA, therefore after  discussion with the patient decision was made to transition to sirolimus that was started on 9/21, patient initially seem to tolerate this well with normalization of kidney function  10/11 presented with flares of ocular and oral GVHD, fluid retention and gain of 5-6 kg, lower extremity edema, abdominal distention, total protein to creatinine ratio elevated at 0.4, in addition to elevation in BUN and creatinine, HTN.  Picture most consistent with sirolimus related side effects.  Less likely that the patient will tolerate even a lower level of sirolimus.  Therefore the patient was instructed to stop sirolimus, last dose on 10/10. 10/14 level 3.5 appropriately trending down.     Corticosteroids  3/1-3/16: prednisone 100mg every day  3/17 75mg every day   3/31 75 alt with 50  4/6: 75 alt with 25mg every other day  4/12 pred tapered to 60 alternating with 25mg   4/15 In setting of viruses trying to reactivate,GVHD stable: Taper 60/15mg alternating.  4/20 decrease pred further to 50/10.    4/25 taper pred to 50/0 every other day as long as symptoms stable.   5/2 Pred decrease 40/0 alternating every other day.   5/13 decrease to 30/0  5/20 decrease to 20/0 then slowly by 5 mg weekly  6/7 decrease to 10/0  Went up to 15/0 with mild increased LFTs  8/23/2022 increased to 20/0, with persistence of oral ulcers and active ocular GVHD.  Discussed with the patient the importance of stopping chewing of nicotine gums for prolonged hours as that would not help with the healing of the oral ulcers.  I discussed with the patient alternatives of nicotine patches that he will reconsider and let me know.  9/6 decreased to 15 alternating with 0  9/13 decreased to 10 alternating with 0  9/21 discontinued tacrolimus given persistent NEGRA and single kidney and start the patient on sirolimus without loading dose.  We will get a list of possible side effects and adverse events from the Pharm.D. see sirolimus section above.  10/11 GVHD flare.  Sirolimus stopped. Resume prednisone 40 mg daily as of 10/12, no change with mildly worsening eye pain 10/14  Reduce pred to 35mg 10/25 and 25mg 11/1 11/8 20 mg   11/22 15 mg  12/13/22 10 mg (plan to taper to 5mg then slow taper 1 mg per month given long pred history)  2/23/23 lower from 5 mg to 4 mg for the next month    Belumosudil  Patient intolerant/with disease flares on tacrolimus and sirolimus see above.  Discussed with the patient the different options for therapy of chronic GVHD that are FDA approved.  Given the side effect profiles after discussing in detail and given the least nephrotoxicity and expected response, taking into account other metabolic effect as well.  We will proceed with prior authorization with belumosudil.  Patient was given material to review for possible expected adverse events and side effects with both Belumosodil and ruxolitinib.   --- Started on 10/18 in p.m. - feeling tired with malaise since starting and worried the drug is responsible     5.  ID: Afebrile with no current signs or symptoms of infection.  He recently was admitted following influenza and is now improved    # cat bite/scratch: doxy 100mg BID x 10 days; completes 11/3/22.  Bite has healed, no signs of infection.     #History of COVID   Positive via homed test 5/8. Treated with Paxlovid with resolution of symptoms.  Had recurrence on 5/18. Resolved  Covid 11/21, 12/21, received his influenza annual vaccine as well as COVID boosters locally 11/16     #History of pulmonary infiltrates:   4/20 CT chest with new RUL infiltrate. Highly immunocompromised. 4/22 Fungitell/asp GM were neg. BAL 4/29 NGTD, increased micafungin to 150mg IV daily-- f/u 6/1 Dr Garcia CT with mixed results, followed with Dr. Garcia August 2022 with resolution of pulmonary nodules and normalization of LFTs we will switch patient will switch patient to posaconazole prophylactic dose and monitor LFTs and any infectious concerns closely (wnl stable  10/14)     #History of CMV viremia:    - CMV viremia up to 1100. 4/15 started valcyte 900mg PO BID. 4/20 CMV <137, 5/10 ND, decreased to 450mg BID 4/30 - continue 4 weeks as long as CMV <137 till 5/28 + weekly CMV  - Switch to acyclovir after completion of current therapy 5/28. 10/11 CMV ND. Check monthly on IST, 11/8 CMV <137, 11/22 ND, recheck 12/8     - PPx:   ACV  Posaconazole (previously on micafungin with LFts abl, hx of pulmonary nodules needign treatment dose).   Pentamidine, last 11/22  Azithro 250mg daily (stopped levaquin due to possible tendonitis).      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 S/p 4/22 and 5/4/22 rituximab 375mg/m2 5/10 ND x2, 6/7 ND, 8/18/2022 971, 10/11 ND, check every other month on IST, 11/8 ND  S/p covid vaccine series 12/2021  S/p evusheld   Deferred annual vaccines in the setting of GVHD and immunosuppression therapy     6. Endo:   - Hx of graves disease; On synthroid 175 mcg daily. TSH normal 4/6/2022 no reflex to T4.  Recheck on 10/18 T3 low at 0.03, free T3 and T4 within normal.  Subclinical hyperthyroidism, 11/22/222 and discussion with endocrine, reduce the levothyroxine to 150 mcg/day and repeat labs in 2-3.  He has follow-up appointment with endocrine    - HLD: 11/2022 cholesterol 355, triglycerides 153, -- 11/8 started rosuvastatin 5 mg daily we will recheck lipid profile in 3 months, 11/22 CPK within normal     7. CV:   - Hypertension history - now on low side after weight loss  - TTE most recently 10/2022     8. GI:   -Omeprazole for heartburn  -LFTs as above, now normal. 9/6 decreased ursodiol to daily   -zofran 8mg IV x1 today; new Rx for zofran sent       9. Psych:   - Situational anxiety - lexapro 10mg daily.   - Insomnia: worse on steroids. Ativan, trazadone, melatonin     10. MSK: left food drop, PT. Occasional muscle cramps discussed optimizing vitamin D levels and considering vitamin D, some of the symptomatology of muscle cramps are  likely related to chronic GVHD.     11. HCM/Age appropriate cancer screening:  -Discussed with the patient importance of age-appropriate cancer screening including colonoscopies, he is due for this.  -Discussed importance of at least once a year vitamin D level check, 8/18/2022 wnl  -Screening for secondary iron overload, see heme section above, start vitamins on 12/13  -Yearly TSH 2022 wnl; yearly lipid panel - see GI section above  -9/6/2022 DEXA scan Based on BMD diagnosis is consistent with normal bone density based on WHO criteria Ref. 1   -PFTs 9/20/22, see above  -Metabolic other, 8/2022 testosterone within normal  -11/22/2022 discussed with the patient the challenges and the stresses of chronic illness and healthcare fatigue.  Patient had previously been on Lexapro that we restarted today confirmed with pharmacy that there are no drug drug interactions.  Additionally we will refer patient to PMNR to help with physical rehab and strength to help with fatigue.  Our social workers will also reach out to Nik and his wife for further resources including support groups for patients with chronic illness and fatigue post transplant.     Summary: I am unsure why he has the fatigue and nausea although this has been ongoing since he developed COVID.  Although he dates this to COVID, and the notes he does describe feeling this way for some time and also attributes some of the change to when he began belumosidil.  He also is taking other medications such as posaconazole and azithromycin that may be contributing and more noticeable now that the prednisone has been decreased.  Lastly, we did taper his prednisone 4 weeks ago from 10 to 5 mg and this could be a contributing factor as well.  I recommended that we stick with the plan at this point and continue the prednisone taper.       Plan  - omeprazole twice daily for 7 days, then go back to daily  - restart vitamin E or flax seed oil  - taper prednisone to 4 mg daily  -  continue azithro and posa for now  - decrease dex s/s to daily  - RTC 4 weeks with Dr. Avila Tobar if she is back  - consider EGD if symptoms do not improve    I spent 40 minutes in the care of this patient today, which included time necessary for preparation for the visit, obtaining history, ordering medications/tests/procedures as medically indicated, review of pertinent medical literature, counseling of the patient, communication of recommendations to the care team, and documentation time.    DOMINIQUE BRITTON MD  February 23, 2023

## 2023-02-25 ASSESSMENT — ENCOUNTER SYMPTOMS
POLYPHAGIA: 0
EYE WATERING: 1
HEADACHES: 1
PARALYSIS: 0
DOUBLE VISION: 0
EYE REDNESS: 1
ABDOMINAL PAIN: 0
BLOOD IN STOOL: 0
DISTURBANCES IN COORDINATION: 0
NAUSEA: 1
CHILLS: 0
BOWEL INCONTINENCE: 0
WEIGHT LOSS: 1
EYE PAIN: 1
WEIGHT GAIN: 0
ALTERED TEMPERATURE REGULATION: 0
HEARTBURN: 0
SEIZURES: 0
LOSS OF CONSCIOUSNESS: 0
RECTAL PAIN: 0
FEVER: 0
TREMORS: 1
CONSTIPATION: 1
WEAKNESS: 1
HALLUCINATIONS: 0
POLYDIPSIA: 0
FATIGUE: 1
NUMBNESS: 0
EYE IRRITATION: 1
INCREASED ENERGY: 1
DIARRHEA: 0
BLOATING: 1
SPEECH CHANGE: 0
SWOLLEN GLANDS: 0
JAUNDICE: 0
NIGHT SWEATS: 0
VOMITING: 0
TINGLING: 1
DECREASED APPETITE: 1
MEMORY LOSS: 0
BRUISES/BLEEDS EASILY: 1
DIZZINESS: 0

## 2023-03-01 ENCOUNTER — OFFICE VISIT (OUTPATIENT)
Dept: ENDOCRINOLOGY | Facility: CLINIC | Age: 63
End: 2023-03-01
Attending: INTERNAL MEDICINE
Payer: COMMERCIAL

## 2023-03-01 ENCOUNTER — PRE VISIT (OUTPATIENT)
Dept: ENDOCRINOLOGY | Facility: CLINIC | Age: 63
End: 2023-03-01

## 2023-03-01 ENCOUNTER — LAB (OUTPATIENT)
Dept: LAB | Facility: CLINIC | Age: 63
End: 2023-03-01
Attending: INTERNAL MEDICINE
Payer: COMMERCIAL

## 2023-03-01 VITALS
SYSTOLIC BLOOD PRESSURE: 141 MMHG | WEIGHT: 244.7 LBS | HEART RATE: 70 BPM | OXYGEN SATURATION: 99 % | DIASTOLIC BLOOD PRESSURE: 77 MMHG | BODY MASS INDEX: 32.28 KG/M2

## 2023-03-01 DIAGNOSIS — R79.89 LOW SERUM CORTISOL LEVEL: ICD-10-CM

## 2023-03-01 DIAGNOSIS — Z79.52 ON CORTICOSTEROID THERAPY: ICD-10-CM

## 2023-03-01 DIAGNOSIS — E89.0 POSTABLATIVE HYPOTHYROIDISM: Primary | ICD-10-CM

## 2023-03-01 DIAGNOSIS — Z86.39 HISTORY OF GRAVES' DISEASE: ICD-10-CM

## 2023-03-01 DIAGNOSIS — E89.0 POSTABLATIVE HYPOTHYROIDISM: ICD-10-CM

## 2023-03-01 DIAGNOSIS — R53.83 FATIGUE, UNSPECIFIED TYPE: ICD-10-CM

## 2023-03-01 DIAGNOSIS — C91.01 ACUTE LYMPHOBLASTIC LEUKEMIA (ALL) IN REMISSION (H): Primary | ICD-10-CM

## 2023-03-01 DIAGNOSIS — Z94.81 STATUS POST BONE MARROW TRANSPLANT (H): ICD-10-CM

## 2023-03-01 DIAGNOSIS — Z94.81 S/P BONE MARROW TRANSPLANT (H): ICD-10-CM

## 2023-03-01 LAB
ALBUMIN SERPL BCG-MCNC: 4.1 G/DL (ref 3.5–5.2)
ALP SERPL-CCNC: 97 U/L (ref 40–129)
ALT SERPL W P-5'-P-CCNC: 20 U/L (ref 10–50)
ANION GAP SERPL CALCULATED.3IONS-SCNC: 11 MMOL/L (ref 7–15)
AST SERPL W P-5'-P-CCNC: 31 U/L (ref 10–50)
BASOPHILS # BLD AUTO: 0 10E3/UL (ref 0–0.2)
BASOPHILS NFR BLD AUTO: 0 %
BILIRUB SERPL-MCNC: 0.9 MG/DL
BUN SERPL-MCNC: 27.5 MG/DL (ref 8–23)
CALCIUM SERPL-MCNC: 10 MG/DL (ref 8.8–10.2)
CHLORIDE SERPL-SCNC: 100 MMOL/L (ref 98–107)
CMV DNA SPEC NAA+PROBE-ACNC: NOT DETECTED IU/ML
CREAT SERPL-MCNC: 1.18 MG/DL (ref 0.67–1.17)
DEPRECATED HCO3 PLAS-SCNC: 25 MMOL/L (ref 22–29)
EOSINOPHIL # BLD AUTO: 0.1 10E3/UL (ref 0–0.7)
EOSINOPHIL NFR BLD AUTO: 1 %
ERYTHROCYTE [DISTWIDTH] IN BLOOD BY AUTOMATED COUNT: 14.9 % (ref 10–15)
GFR SERPL CREATININE-BSD FRML MDRD: 69 ML/MIN/1.73M2
GLUCOSE SERPL-MCNC: 107 MG/DL (ref 70–99)
HCT VFR BLD AUTO: 33.5 % (ref 40–53)
HGB BLD-MCNC: 11.5 G/DL (ref 13.3–17.7)
IMM GRANULOCYTES # BLD: 0.1 10E3/UL
IMM GRANULOCYTES NFR BLD: 0 %
LYMPHOCYTES # BLD AUTO: 4.4 10E3/UL (ref 0.8–5.3)
LYMPHOCYTES NFR BLD AUTO: 38 %
MAGNESIUM SERPL-MCNC: 2.3 MG/DL (ref 1.7–2.3)
MCH RBC QN AUTO: 34.4 PG (ref 26.5–33)
MCHC RBC AUTO-ENTMCNC: 34.3 G/DL (ref 31.5–36.5)
MCV RBC AUTO: 100 FL (ref 78–100)
MONOCYTES # BLD AUTO: 1.2 10E3/UL (ref 0–1.3)
MONOCYTES NFR BLD AUTO: 11 %
NEUTROPHILS # BLD AUTO: 5.9 10E3/UL (ref 1.6–8.3)
NEUTROPHILS NFR BLD AUTO: 50 %
NRBC # BLD AUTO: 0 10E3/UL
NRBC BLD AUTO-RTO: 0 /100
PLATELET # BLD AUTO: 174 10E3/UL (ref 150–450)
POTASSIUM SERPL-SCNC: 4.5 MMOL/L (ref 3.4–5.3)
PROT SERPL-MCNC: 6.7 G/DL (ref 6.4–8.3)
RBC # BLD AUTO: 3.34 10E6/UL (ref 4.4–5.9)
SODIUM SERPL-SCNC: 136 MMOL/L (ref 136–145)
T3 SERPL-MCNC: 102 NG/DL (ref 85–202)
T4 FREE SERPL-MCNC: 1.7 NG/DL (ref 0.9–1.7)
TSH SERPL DL<=0.005 MIU/L-ACNC: 0.12 UIU/ML (ref 0.3–4.2)
WBC # BLD AUTO: 11.7 10E3/UL (ref 4–11)

## 2023-03-01 PROCEDURE — 87799 DETECT AGENT NOS DNA QUANT: CPT

## 2023-03-01 PROCEDURE — 250N000011 HC RX IP 250 OP 636: Performed by: INTERNAL MEDICINE

## 2023-03-01 PROCEDURE — 83735 ASSAY OF MAGNESIUM: CPT

## 2023-03-01 PROCEDURE — 36591 DRAW BLOOD OFF VENOUS DEVICE: CPT

## 2023-03-01 PROCEDURE — 85004 AUTOMATED DIFF WBC COUNT: CPT

## 2023-03-01 PROCEDURE — 94642 AEROSOL INHALATION TREATMENT: CPT | Performed by: INTERNAL MEDICINE

## 2023-03-01 PROCEDURE — 99245 OFF/OP CONSLTJ NEW/EST HI 55: CPT

## 2023-03-01 PROCEDURE — 94640 AIRWAY INHALATION TREATMENT: CPT | Performed by: INTERNAL MEDICINE

## 2023-03-01 PROCEDURE — 84439 ASSAY OF FREE THYROXINE: CPT

## 2023-03-01 PROCEDURE — 80053 COMPREHEN METABOLIC PANEL: CPT

## 2023-03-01 PROCEDURE — 84443 ASSAY THYROID STIM HORMONE: CPT

## 2023-03-01 PROCEDURE — 84480 ASSAY TRIIODOTHYRONINE (T3): CPT

## 2023-03-01 RX ORDER — PENTAMIDINE ISETHIONATE 300 MG/300MG
300 INHALANT RESPIRATORY (INHALATION)
Status: DISCONTINUED | OUTPATIENT
Start: 2023-03-01 | End: 2023-03-01 | Stop reason: HOSPADM

## 2023-03-01 RX ORDER — ALBUTEROL SULFATE 0.83 MG/ML
2.5 SOLUTION RESPIRATORY (INHALATION)
Status: CANCELLED
Start: 2023-03-22

## 2023-03-01 RX ORDER — LEVOTHYROXINE SODIUM 150 UG/1
150 TABLET ORAL DAILY
Qty: 30 TABLET | Refills: 11 | Status: SHIPPED | OUTPATIENT
Start: 2023-03-01 | End: 2023-08-16 | Stop reason: DRUGHIGH

## 2023-03-01 RX ORDER — HEPARIN SODIUM (PORCINE) LOCK FLUSH IV SOLN 100 UNIT/ML 100 UNIT/ML
5 SOLUTION INTRAVENOUS ONCE
Status: COMPLETED | OUTPATIENT
Start: 2023-03-01 | End: 2023-03-01

## 2023-03-01 RX ORDER — PENTAMIDINE ISETHIONATE 300 MG/300MG
300 INHALANT RESPIRATORY (INHALATION)
Status: CANCELLED
Start: 2023-03-22

## 2023-03-01 RX ORDER — ALBUTEROL SULFATE 0.83 MG/ML
2.5 SOLUTION RESPIRATORY (INHALATION)
Status: DISCONTINUED | OUTPATIENT
Start: 2023-03-01 | End: 2023-03-01 | Stop reason: HOSPADM

## 2023-03-01 RX ADMIN — Medication 5 ML: at 11:17

## 2023-03-01 RX ADMIN — PENTAMIDINE ISETHIONATE 300 MG: 300 INHALANT RESPIRATORY (INHALATION) at 11:59

## 2023-03-01 ASSESSMENT — PAIN SCALES - GENERAL: PAINLEVEL: NO PAIN (0)

## 2023-03-01 NOTE — PATIENT INSTRUCTIONS
Labs before 0800 and before taking daily steroids  in the near future  Labs in 2 months  I will send results on [employer]

## 2023-03-01 NOTE — PROGRESS NOTES
Nik HARRY Pastranacolt was seen today for a Pentamidine nebulizer tx ordered by Dr. Cote.    Patient was first given 4 puffs of Albuterol MDI, after which Pentamidine 300 mg (Lot # [id number]) mixed with 6cc Sterile H20 was administered through a filtered nebulizer.    Pre-treatment: SpO2 = 99%   HR = 72   BBS = clear   Post-treatment: SpO2 = 99%  HR = 74  BBS = clear    No adverse side effects noted by the patient.    This service today was provided Dr. Oden, who was available if needed.     Procedure was completed by Thuy Modi.

## 2023-03-01 NOTE — PROGRESS NOTES
Endocrine Consult note    Attending Assessment/Plan :     Postablative hypothyroidism - unstable.  On LT4 150 mcg/day since late December.   Labs from today are showing improvement.  T3 is pending. Continue LT4 150 mcg/day and repeat labs in 2months    History of Graves' hyperthyroidism s/p 131I treatment 1996.  He does not appear to have LUZ MARIA  T3 pending from today , helps us understand how much of the low TSH is due to endogenous vs exogenous thyroid hormone.     On corticosteroid which has been tapering.  This is a risk for the bone and for suppression of the HPA.   Currently at prednisone 4 mg/day for < 1 week. We are below physiologic replacement and HPA has not been tested.     Addendum  3/1/2023 T3 102   3/3/2023 0748 cortisol 2 -- strong suggestion of HPA suppression - he should be on minimum dose prednisone 5 mg/day or equivalent.   5/2/23 1134 AM.  TSH 0.15, free T4 1.55, T3 pending; He was supposed to get labs drawn before 0800 . Since he didn't, they didn't do the cortisol  6/13/23 1424  cortisol 1.4 uninterpretable drawn at wrong time of day; Ca 10.3  6/20/23 0723 cortisol 0.5   8/15/23 TSH 0.16, free T4 1.71, Ca 10, creatinine 1.18,   8/16/23 LT4 137 mcg/day  11/14/23 TSH 0.08,f ree T4 1.83  --  Reduce to LT4 125 mcg/day  11/2723 TSH 0.89, free T4 1.37, t3 88  1/29/24 0935 AM not ordered by me , unknown circumstances ([place], Dr Chris Ledbetter): testosterone 134, PSA 0.83-- not reports ED, fatigue not able to tolerate CPAP mask prescribed for sleep apnea  1/30/24 [place] ED not ordered by me TSH 0.4-- Diagnosis SIRS  2/20/24 TSh 0.33, free T4 1.59  5/9/24 TSH 1.32, free T4 1.52- next appt with me 11/26/24     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     History or recent COVID-- he cites he last felt well priotr to covid.      Fatigue , low energy -a nonspecific symptom- as above.      Tremor - attributed to albuterol today.  Steroids  may also be playing a role.  Plan as per # 1     Chart review/prep time 1    76__ minutes spent on the date of the encounter doing chart review, history and exam, documentation and further activities as noted above.      Chief complaint:  Nik is a 62 year old male seen in consultation at the request of Dr Akshat Tobar for thyroid.  I have reviewed Care Everywhere including [facility] lab reports, imaging reports and provider notes as indicated.      HISTORY OF PRESENT ILLNESS    In 1996 he was treated with 16 mCi 131I for hyperthyroidism.  We do not have thyroid lab values prior to 2003.  Since 11/18/2021 the lT4 dose has been progressively reduced from 200 to 175 to 150 mcg/day.  He has been on the current dose just over 2 months.       BMT 8/10/2021.  With this, he got some steroid treatments. However, he was not on continuous steroid until he was diagnosed with GVHD. One year ago involving eye, skin, mouth, liver.  He started on high dose prednisone 100 mg 3/1/22.  The prednisone is currently tapering.   He is currently on prednisone 4 mg/day .  He has been on this dose since last week, was on 5 mg/day for one month prior to that and 10 mg/day prior to that.   Nausea and not feeling right started  within weeks after reducing prednisone from 10 mg/day to 5 mg/day.     Selected Endocrine relevant labs are as follows:  12/2/2003 TSh 2.92  11/21/2006 free T4 1.6  1/8/2008 TSH 6.60  4/23/2008 free T4 1.7  7/16/08 free T4 1.9  8/27/08 free T4 1.7  2/4/2009 free T4 1.8  5/1/2014 free T4 1.22  4/6/17 free T4 1.67  11/9/2021 TSH 0.34, free T4 1.28  2/4/2010 TSH 4.31  1/6/2016 TSh 1.22  1/8/2021 TSH 14.11, free T4 1.22  1/11/2021 LT4 200 mcg/day   2/11/2021 TSh 10.84, free T4 1.3  3/2/2021 TSh 0.03  3/23/2021 free T4 2.48  4/4/2021 TSh 0.04  5/24/2021 TSh 1.99  11/18/2021 weight 230#, HR 87/minute TSH 0.23, free T4 1.33  11/18/2021 LT4 175 mcg/day  4/6/22 TSh 0.88  10/18/22 TSH 0.03, free t4 1.51, free t3  2.27  11/15/22 free T4 1.37  12/1/22  Weight 245, HR 80/minute, TSh 0.08, free T4 2.08  12/1/22 CT with contrast  12/27/22 weight 246, /minute TSH 0.06, free T4 1.92  12/27/22 LT4 150 mcg/day  1/10/23 weight 20#, HR 77/minute TSH 0.04, free T4 1.67, 3 88    Relevant imaging is as follows: (as read by me as it pertains to endocrine relevant organs)  4/26/1996 123I thyroid uptake/scan: 24 hour uptake 53%; diffuse; small pyramidal lobe.   4/29/1996 16 mCi 131I   9/6/22 DXA    REVIEW OF SYSTEMS  Last fetl well months ago - before he had covid  Low energy; had to recline after treadmill yesterday  No appetite  nop temperature intolerance.    Early satiety    Answers for HPI/ROS submitted by the patient on 2/25/2023  General Symptoms: Yes  Skin Symptoms: No  HENT Symptoms: No  EYE SYMPTOMS: Yes  HEART SYMPTOMS: No  LUNG SYMPTOMS: No-- feels weak climbing stairs, not SOB;   INTESTINAL SYMPTOMS: Yes  URINARY SYMPTOMS: No  REPRODUCTIVE SYMPTOMS: Yes  SKELETAL SYMPTOMS: No  BLOOD SYMPTOMS: Yes  NERVOUS SYSTEM SYMPTOMS: Yes  MENTAL HEALTH SYMPTOMS: No  Fever: No  Loss of appetite: Yes  Weight loss: Yes  Weight gain: No  Fatigue: Yes  Night sweats: No  Chills: No  Increased stress: No  Excessive hunger: No  Excessive thirst: No  Feeling hot or cold when others believe the temperature is normal: No  Loss of height: No  Post-operative complications: No  Surgical site pain: No  Hallucinations: No  Change in or Loss of Energy: Yes  Hyperactivity: No  Confusion: No  Eye pain: Yes  Vision loss: No  Dry eyes: Yes- attributed to GVHD  Watery eyes: Yes  Eye bulging: No  Double vision: No  Flashing of lights: No  Spots: No  Floaters: No  Redness: Yes  Crossed eyes: No  Tunnel Vision: No  Yellowing of eyes: No  Eye irritation: Yes  Heart burn or indigestion: No  Nausea: Yes, daily - onset within weeks after reducing prednisone from 10 mg/day to 5 mg/day.    Vomiting: No  Abdominal pain: No  Bloating: Yes after eating   Constipation:  Yes requires nightly senna; BM loose in the last 2 AM: BM usually 1/day, sometimes more  Diarrhea: No  Blood in stool: No  Black stools: No  Rectal or Anal pain: No  Fecal incontinence: No  Yellowing of skin or eyes: No  Vomit with blood: No  Change in stools: No  Scrotal pain or swelling: No  Erectile dysfunction: Yes  Penile discharge: No  Genital ulcers: No  Reduced libido: Yes  Anemia: Yes  Swollen glands: No  Easy bleeding or bruising: Yes  Edema or swelling: No  Trouble with coordination: No  Dizziness or trouble with balance: No  Fainting or black-out spells: No  Memory loss: No  Headache: Yes  Seizures: No  Speech problems: No  Tingling: Yes  Tremor: Yes  Weakness: Yes  Difficulty walking: No  Paralysis: No  Numbness: No      10 system ROS otherwise as per the HPI or negative    Past Medical History  Past Medical History:   Diagnosis Date    ALL (acute lymphocytic leukemia) (H)     CKD (chronic kidney disease)     GVHD (graft versus host disease) (H)     H/O peripheral stem cell transplant (H)     Hyperthyroidism 1996    Infection due to 2019 novel coronavirus 01/16/2023    Influenza A 11/2022    Postablative hypothyroidism 1997    Pulmonary embolism (H)     Renal cell carcinoma (H) 2007    right kidney    Sleep apnea        Medications  Current Outpatient Medications   Medication Sig Dispense Refill    acyclovir (ZOVIRAX) 800 MG tablet Take 1 tablet (800 mg) by mouth 2 times daily 60 tablet 1    azithromycin (ZITHROMAX) 250 MG tablet Take 1 tablet (250 mg) by mouth daily 30 tablet 3    Belumosudil Mesylate 200 MG TABS Take 200 mg by mouth daily 30 tablet 3    Carboxymethylcell-Glycerin PF (REFRESH RELIEVA PF) 0.5-1 % SOLN Apply 1 drop to eye 4 times daily Preservative free. Either PF multidose bottle or PF vials 30 each 11    carboxymethylcellulose PF (CARBOXYMETHYLCELLULOSE SODIUM) 0.5 % ophthalmic solution Place 1 drop into both eyes 4 times daily 70 each 11    dexamethasone alcohol-free (DECADRON) 0.1  MG/ML solution Swish and spit 20 mLs (2 mg) in mouth 4 times daily 1000 mL 3    erythromycin (ROMYCIN) 5 MG/GM ophthalmic ointment Place 0.5 inches into both eyes At Bedtime 3.5 g 4    fluorometholone (FML LIQUIFILM) 0.1 % ophthalmic suspension Place 1 drop into both eyes 2 times daily For total of 21 days then stop 5 mL 4    levothyroxine (SYNTHROID/LEVOTHROID) 150 MCG tablet Take 1 tablet (150 mcg) by mouth daily 30 tablet 11    LORazepam (ATIVAN) 1 MG tablet Take 1 tablet (1 mg) by mouth nightly as needed for anxiety or sleep 30 tablet 3    magnesium oxide (MAG-OX) 400 MG tablet Take 1 tablet (400 mg) by mouth 2 times daily 120 tablet 1    nicotine polacrilex (NICORETTE) 4 MG gum Take 4 mg by mouth as needed for smoking cessation       omeprazole (PRILOSEC) 40 MG DR capsule Take 1 capsule (40 mg) by mouth daily 30 capsule 3    ondansetron (ZOFRAN ODT) 8 MG ODT tab Take 1 tablet (8 mg) by mouth every 8 hours as needed for nausea 30 tablet 0    posaconazole (NOXAFIL) 100 MG EC tablet Take 3 tablets (300 mg) by mouth every morning 90 tablet 3    predniSONE (DELTASONE) 1 MG tablet Take 4 tablets (4 mg) by mouth daily 120 tablet 1    rosuvastatin (CRESTOR) 5 MG tablet Take 1 tablet (5 mg) by mouth daily 30 tablet 3    sennosides (SENOKOT) 8.6 MG tablet Take 1 tablet by mouth 3 times daily as needed for constipation 90 tablet 0    sodium chloride 0.9 % neb solution 3 mLs by Other route 2 times daily 300 mL 11    UNABLE TO FIND Take 1,200 mg by mouth daily MEDICATION NAME: Flaxseed Oil      predniSONE (DELTASONE) 5 MG tablet  (Patient not taking: Reported on 3/1/2023) 60 tablet 3     Swish and spit is once/day  Steroid eye drops  Flax seed oil  Calcium + D  Pure protein shake once/day    Allergies  Allergies   Allergen Reactions    Sulfamethoxazole W/Trimethoprim Rash     Family History  family history includes Anesthesia Reaction in his father; Coronary Artery Disease in his maternal grandmother; Depression in his  mother; Diabetes in his maternal grandmother; Hypertension in his maternal grandmother; Hypothyroidism in his sister; Lung Cancer in his mother; Polycythemia in his father.    Social History  Social History     Tobacco Use    Smoking status: Former     Packs/day: 2.00     Years: 30.00     Pack years: 60.00     Types: Cigarettes     Quit date: 5/4/2019     Years since quitting: 3.8    Smokeless tobacco: Never   Substance Use Topics    Alcohol use: Not Currently    Drug use: Never       ; wife Lamar. She was just diagnosed with papillary thyroid carcinoma.     Physical Exam  GENERAL mask; pleasant man in NAD; his wife is present.   He does not look cushingoid  BP (!) 141/77 (BP Location: Right arm, Patient Position: Sitting, Cuff Size: Adult Regular)   Pulse 70   Wt 111 kg (244 lb 11.2 oz)   SpO2 99%   BMI 32.28 kg/m    SKIN: normal color, temperature, texture without  purple striae  HEENT: PER, no scleral icterus, eyelid retraction, stare, lid lag, proptosis or conjunctival injection.  .   NECK: supple.  No visible or palpable neck masses, cervical adenopathy,  or goiter.   LUNGS: clear to auscultation bilaterally.   CARDIAC: RRR, S1, S2 without murmurs, rubs or gallops.    BACK: normal spinal contour.    NEURO: Alert, responds appropriately to questions,  moves all extremities, DTRs 2/4, gait normal, + coarse  tremor of the outstretched hand    DATA REVIEW      [facility]2023   THYROID STIMULATING HORMONE (R) 0.30 - 4.20 uIU/mL 0.12 (L)   FREE T3 2.0 - 4.4 pg/mL    TRIIODOTHYRONINE(T3) 85 - 202 ng/dL    FREE T4 0.90 - 1.70 ng/dL 1.70     [facility] Lat[facility] Units 1/10/2023   THYROID STIMULATING HORMONE (R) 0.30 - 4.20 uIU/mL 0.04 (L)   FREE T3 2.0 - 4.4 pg/mL    TRIIODOTHYRONINE(T3) 85 - 202 ng/dL 88   FREE T4 0.90 - 1.70 ng/dL 1.67     [facility] Lat[facility] Units 12/27/2022   THYROID STIMULATING HORMONE (R) 0.30 - 4.20 uIU/mL 0.06  (L)   FREE T3 2.0 - 4.4 pg/mL    TRIIODOTHYRONINE(T3) 85 - 202 ng/dL    FREE T4 0.90 - 1.70 ng/dL 1.92 (H)     [facility] 12/1/2022   THYROID STIMULATING HORMONE (R) 0.30 - 4.20 uIU/mL 0.08 (L)   FREE T3 2.0 - 4.4 pg/mL    TRIIODOTHYRONINE(T3) 85 - 202 ng/dL    FREE T4 0.90 - 1.70 ng/dL 2.09 (H)     [facility] Lat[facility] & Units 11/15/2022   THYROID STIMULATING HORMONE (R) 0.30 - 4.20 uIU/mL    FREE T3 2.0 - 4.4 pg/mL    TRIIODOTHYRONINE(T3) 85 - 202 ng/dL    FREE T4 0.90 - 1.70 ng/dL 1.37     [facility] Latest [facility] R[facility] & Units 10/18/2022   THYROID STIMULATING HORMONE (R) 0.30 - 4.20 uIU/mL 0.03 (L)   FREE T3 2.0 - 4.4 pg/mL 2.7   TRIIODOTHYRONINE(T3) 85 - 202 ng/dL    FREE T4 0.90 - 1.70 ng/dL 1.51

## 2023-03-01 NOTE — LETTER
3/1/2023       RE: Nik Nava  [address] [facility] [id number]     Dear Colleague,    Thank you for referring your patient, Nik Nava, to the [facility]OCRINOLOG[facility] [place] at [facility]. Please see a copy of my visit note below.    Endocrine Consult note    Attending Assessment/Plan :     Postablative hypothyroidism - unstable.  On LT4 150 mcg/day since late December.   Labs from today are showing improvement.  T3 is pending. Continue LT4 150 mcg/day and repeat labs in 2months    History of Graves' hyperthyroidism s/p 131I treatment 1996.  He does not appear to have LUZ MARIA  T3 pending from today , helps us understand how much of the low TSH is due to endogenous vs exogenous thyroid hormone.     On corticosteroid which has been tapering.  This is a risk for the bone and for suppression of the HPA.   Currently at prednisone 4 mg/day for < 1 week. We are below physiologic replacement and HPA has not been tested.     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     History or recent COVID-- he cites he last felt well priotr to covid.      Fatigue , low energy -a nonspecific symptom- as above.      Tremor - attributed to albuterol today.  Steroids may also be playing a role.  Plan as per # 1     Chart review/prep time 1    76__ minutes spent on the date of the encounter doing chart review, history and exam, documentation and further activities as noted above.      Chief complaint:  Nik is a 62 year old male seen in consultation at the request of Dr Akshat Tobar for thyroid.  I have reviewed Care Everywhere including [facility] lab reports, imaging reports and provider notes as indicated.      HISTORY OF PRESENT ILLNESS    In 1996 he was treated with 16 mCi 131I for hyperthyroidism.  We do not have thyroid lab values prior to 2003.  Since 11/18/2021 the lT4  dose has been progressively reduced from 200 to 175 to 150 mcg/day.  He has been on the current dose just over 2 months.       BMT 8/10/2021.  With this, he got some steroid treatments. However, he was not on continuous steroid until he was diagnosed with GVHD. One year ago involving eye, skin, mouth, liver.  He started on high dose prednisone 100 mg 3/1/22.  The prednisone is currently tapering.   He is currently on prednisone 4 mg/day .  He has been on this dose since last week, was on 5 mg/day for one month prior to that and 10 mg/day prior to that.   Nausea and not feeling right started  within weeks after reducing prednisone from 10 mg/day to 5 mg/day.     Selected Endocrine relevant labs are as follows:  12/2/2003 TSh 2.92  11/21/2006 free T4 1.6  1/8/2008 TSH 6.60  4/23/2008 free T4 1.7  7/16/08 free T4 1.9  8/27/08 free T4 1.7  2/4/2009 free T4 1.8  5/1/2014 free T4 1.22  4/6/17 free T4 1.67  11/9/2021 TSH 0.34, free T4 1.28  2/4/2010 TSH 4.31  1/6/2016 TSh 1.22  1/8/2021 TSH 14.11, free T4 1.22  1/11/2021 LT4 200 mcg/day   2/11/2021 TSh 10.84, free T4 1.3  3/2/2021 TSh 0.03  3/23/2021 free T4 2.48  4/4/2021 TSh 0.04  5/24/2021 TSh 1.99  11/18/2021 weight 230#, HR 87/minute TSH 0.23, free T4 1.33  11/18/2021 LT4 175 mcg/day  4/6/22 TSh 0.88  10/18/22 TSH 0.03, free t4 1.51, free t3 2.27  11/15/22 free T4 1.37  12/1/22  Weight 245, HR 80/minute, TSh 0.08, free T4 2.08  12/1/22 CT with contrast  12/27/22 weight 246, /minute TSH 0.06, free T4 1.92  12/27/22 LT4 150 mcg/day  1/10/23 weight 20#, HR 77/minute TSH 0.04, free T4 1.67, 3 88    Relevant imaging is as follows: (as read by me as it pertains to endocrine relevant organs)  4/26/1996 123I thyroid uptake/scan: 24 hour uptake 53%; diffuse; small pyramidal lobe.   4/29/1996 16 mCi 131I   9/6/22 DXA    REVIEW OF SYSTEMS  Last fetl well months ago - before he had covid  Low energy; had to recline after treadmill yesterday  No appetite  nop temperature  intolerance.    Early satiety    Answers for HPI/ROS submitted by the patient on 2/25/2023  General Symptoms: Yes  Skin Symptoms: No  HENT Symptoms: No  EYE SYMPTOMS: Yes  HEART SYMPTOMS: No  LUNG SYMPTOMS: No-- feels weak climbing stairs, not SOB;   INTESTINAL SYMPTOMS: Yes  URINARY SYMPTOMS: No  REPRODUCTIVE SYMPTOMS: Yes  SKELETAL SYMPTOMS: No  BLOOD SYMPTOMS: Yes  NERVOUS SYSTEM SYMPTOMS: Yes  MENTAL HEALTH SYMPTOMS: No  Fever: No  Loss of appetite: Yes  Weight loss: Yes  Weight gain: No  Fatigue: Yes  Night sweats: No  Chills: No  Increased stress: No  Excessive hunger: No  Excessive thirst: No  Feeling hot or cold when others believe the temperature is normal: No  Loss of height: No  Post-operative complications: No  Surgical site pain: No  Hallucinations: No  Change in or Loss of Energy: Yes  Hyperactivity: No  Confusion: No  Eye pain: Yes  Vision loss: No  Dry eyes: Yes- attributed to GVHD  Watery eyes: Yes  Eye bulging: No  Double vision: No  Flashing of lights: No  Spots: No  Floaters: No  Redness: Yes  Crossed eyes: No  Tunnel Vision: No  Yellowing of eyes: No  Eye irritation: Yes  Heart burn or indigestion: No  Nausea: Yes, daily - onset within weeks after reducing prednisone from 10 mg/day to 5 mg/day.    Vomiting: No  Abdominal pain: No  Bloating: Yes after eating   Constipation: Yes requires nightly senna; BM loose in the last 2 AM: BM usually 1/day, sometimes more  Diarrhea: No  Blood in stool: No  Black stools: No  Rectal or Anal pain: No  Fecal incontinence: No  Yellowing of skin or eyes: No  Vomit with blood: No  Change in stools: No  Scrotal pain or swelling: No  Erectile dysfunction: Yes  Penile discharge: No  Genital ulcers: No  Reduced libido: Yes  Anemia: Yes  Swollen glands: No  Easy bleeding or bruising: Yes  Edema or swelling: No  Trouble with coordination: No  Dizziness or trouble with balance: No  Fainting or black-out spells: No  Memory loss: No  Headache: Yes  Seizures: No  Speech  problems: No  Tingling: Yes  Tremor: Yes  Weakness: Yes  Difficulty walking: No  Paralysis: No  Numbness: No      10 system ROS otherwise as per the HPI or negative    Past Medical History  Past Medical History:   Diagnosis Date     ALL (acute lymphocytic leukemia) (H)      CKD (chronic kidney disease)      GVHD (graft versus host disease) (H)      H/O peripheral stem cell transplant (H)      Hyperthyroidism 1996     Infection due to 2019 novel coronavirus 01/16/2023     Influenza A 11/2022     Postablative hypothyroidism 1997     Pulmonary embolism (H)      Renal cell carcinoma (H) 2007    right kidney     Sleep apnea        Medications  Current Outpatient Medications   Medication Sig Dispense Refill     acyclovir (ZOVIRAX) 800 MG tablet Take 1 tablet (800 mg) by mouth 2 times daily 60 tablet 1     azithromycin (ZITHROMAX) 250 MG tablet Take 1 tablet (250 mg) by mouth daily 30 tablet 3     Belumosudil Mesylate 200 MG TABS Take 200 mg by mouth daily 30 tablet 3     Carboxymethylcell-Glycerin PF (REFRESH RELIEVA PF) 0.5-1 % SOLN Apply 1 drop to eye 4 times daily Preservative free. Either PF multidose bottle or PF vials 30 each 11     carboxymethylcellulose PF (CARBOXYMETHYLCELLULOSE SODIUM) 0.5 % ophthalmic solution Place 1 drop into both eyes 4 times daily 70 each 11     dexamethasone alcohol-free (DECADRON) 0.1 MG/ML solution Swish and spit 20 mLs (2 mg) in mouth 4 times daily 1000 mL 3     erythromycin (ROMYCIN) 5 MG/GM ophthalmic ointment Place 0.5 inches into both eyes At Bedtime 3.5 g 4     fluorometholone (FML LIQUIFILM) 0.1 % ophthalmic suspension Place 1 drop into both eyes 2 times daily For total of 21 days then stop 5 mL 4     levothyroxine (SYNTHROID/LEVOTHROID) 150 MCG tablet Take 1 tablet (150 mcg) by mouth daily 30 tablet 11     LORazepam (ATIVAN) 1 MG tablet Take 1 tablet (1 mg) by mouth nightly as needed for anxiety or sleep 30 tablet 3     magnesium oxide (MAG-OX) 400 MG tablet Take 1 tablet (400  mg) by mouth 2 times daily 120 tablet 1     nicotine polacrilex (NICORETTE) 4 MG gum Take 4 mg by mouth as needed for smoking cessation        omeprazole (PRILOSEC) 40 MG DR capsule Take 1 capsule (40 mg) by mouth daily 30 capsule 3     ondansetron (ZOFRAN ODT) 8 MG ODT tab Take 1 tablet (8 mg) by mouth every 8 hours as needed for nausea 30 tablet 0     posaconazole (NOXAFIL) 100 MG EC tablet Take 3 tablets (300 mg) by mouth every morning 90 tablet 3     predniSONE (DELTASONE) 1 MG tablet Take 4 tablets (4 mg) by mouth daily 120 tablet 1     rosuvastatin (CRESTOR) 5 MG tablet Take 1 tablet (5 mg) by mouth daily 30 tablet 3     sennosides (SENOKOT) 8.6 MG tablet Take 1 tablet by mouth 3 times daily as needed for constipation 90 tablet 0     sodium chloride 0.9 % neb solution 3 mLs by Other route 2 times daily 300 mL 11     UNABLE TO FIND Take 1,200 mg by mouth daily MEDICATION NAME: Flaxseed Oil       predniSONE (DELTASONE) 5 MG tablet  (Patient not taking: Reported on 3/1/2023) 60 tablet 3     Swish and spit is once/day  Steroid eye drops  Flax seed oil  Calcium + D  Pure protein shake once/day    Allergies  Allergies   Allergen Reactions     Sulfamethoxazole W/Trimethoprim Rash     Family History  family history includes Anesthesia Reaction in his father; Coronary Artery Disease in his maternal grandmother; Depression in his mother; Diabetes in his maternal grandmother; Hypertension in his maternal grandmother; Hypothyroidism in his sister; Lung Cancer in his mother; Polycythemia in his father.    Social History  Social History     Tobacco Use     Smoking status: Former     Packs/day: 2.00     Years: 30.00     Pack years: 60.00     Types: Cigarettes     Quit date: 5/4/2019     Years since quitting: 3.8     Smokeless tobacco: Never   Substance Use Topics     Alcohol use: Not Currently     Drug use: Never       ; wife Lamar. She was just diagnosed with papillary thyroid carcinoma.     Physical Exam  GENERAL  mask; pleasant man in NAD; his wife is present.   He does not look cushingoid  BP (!) 141/77 (BP Location: Right arm, Patient Position: Sitting, Cuff Size: Adult Regular)   Pulse 70   Wt 111 kg (244 lb 11.2 oz)   SpO2 99%   BMI 32.28 kg/m    SKIN: normal color, temperature, texture without  purple striae  HEENT: PER, no scleral icterus, eyelid retraction, stare, lid lag, proptosis or conjunctival injection.  .   NECK: supple.  No visible or palpable neck masses, cervical adenopathy,  or goiter.   LUNGS: clear to auscultation bilaterally.   CARDIAC: RRR, S1, S2 without murmurs, rubs or gallops.    BACK: normal spinal contour.    NEURO: Alert, responds appropriately to questions,  moves all extremities, DTRs 2/4, gait normal, + coarse  tremor of the outstretched hand    DATA REVIEW      [facility] 3/1/2023   THYROID STIMULATING HORMONE (R) 0.30 - 4.20 uIU/mL 0.12 (L)   FREE T3 2.0 - 4.4 pg/mL    TRIIODOTHYRONINE(T3) 85 - 202 ng/dL    FREE T4 0.90 - 1.70 ng/dL 1.70     [facility] 1/10/2023   THYROID STIMULATING HORMONE (R) 0.30 - 4.20 uIU/mL 0.04 (L)   FREE T3 2.0 - 4.4 pg/mL    TRIIODOTHYRONINE(T3) 85 - 202 ng/dL 88   FREE T4 0.90 - 1.70 ng/dL 1.67     [facility] Lat[facility] & Units 12/27/2022   THYROID STIMULATING HORMONE (R) 0.30 - 4.20 uIU/mL 0.06 (L)   FREE T3 2.0 - 4.4 pg/mL    TRIIODOTHYRONINE(T3) 85 - 202 ng/dL    FREE T4 0.90 - 1.70 ng/dL 1.92 (H)     [facility] Lat[facility] R[facility] & Units 12/1/2022   THYROID STIMULATING HORMONE (R) 0.30 - 4.20 uIU/mL 0.08 (L)   FREE T3 2.0 - 4.4 pg/mL    TRIIODOTHYRONINE(T3) 85 - 202 ng/dL    FREE T4 0.90 - 1.70 ng/dL 2.09 (H)     ENDO THYROID LABS-[facility] Latest Ref Rng & Units 11/15/2022   THYROID STIMULATING HORMONE (R) 0.30 - 4.20 uIU/mL    FREE T3 2.0 - 4.4 pg/mL    TRIIODOTHYRONINE(T3) 85 - 202 ng/dL    FREE T4 0.90 - 1.70 ng/dL 1.37     ENDO THYROID LABS-[facility] Latest Ref Rng & Units  10/18/2022   THYROID STIMULATING HORMONE (R) 0.30 - 4.20 uIU/mL 0.03 (L)   FREE T3 2.0 - 4.4 pg/mL 2.7   TRIIODOTHYRONINE(T3) 85 - 202 ng/dL    FREE T4 0.90 - 1.70 ng/dL 1.51       Natalia Gray MD

## 2023-03-02 LAB
EBV DNA COPIES/ML, INSTRUMENT: 956 COPIES/ML
EBV DNA SPEC NAA+PROBE-LOG#: 3 {LOG_COPIES}/ML

## 2023-03-03 ENCOUNTER — LAB (OUTPATIENT)
Dept: ONCOLOGY | Facility: CLINIC | Age: 63
End: 2023-03-03
Payer: COMMERCIAL

## 2023-03-03 DIAGNOSIS — Z79.52 ON CORTICOSTEROID THERAPY: ICD-10-CM

## 2023-03-03 LAB — CORTIS SERPL-MCNC: 2 UG/DL

## 2023-03-03 PROCEDURE — 82533 TOTAL CORTISOL: CPT | Performed by: INTERNAL MEDICINE

## 2023-03-03 PROCEDURE — 36591 DRAW BLOOD OFF VENOUS DEVICE: CPT | Performed by: INTERNAL MEDICINE

## 2023-03-03 RX ORDER — HEPARIN SODIUM (PORCINE) LOCK FLUSH IV SOLN 100 UNIT/ML 100 UNIT/ML
5 SOLUTION INTRAVENOUS ONCE
Status: COMPLETED | OUTPATIENT
Start: 2023-03-03 | End: 2023-03-03

## 2023-03-03 RX ADMIN — HEPARIN SODIUM (PORCINE) LOCK FLUSH IV SOLN 100 UNIT/ML 5 ML: 100 SOLUTION at 07:54

## 2023-03-03 NOTE — PROGRESS NOTES
"  Patient's name and  were verified.  See Doc Flowsheet - IV assess for details.  IVAD accessed with 20G 3/4\" decker gripper plus needle  blood return positive: YES  Site without redness, tenderness or swelling: YES  flushed with 10cc NS and 5cc 100u/ml heparin  Needle: removed- not needed.   Comments: Labs drawn.  Patient tolerated procedure without incident.    "

## 2023-03-05 ENCOUNTER — MYC MEDICAL ADVICE (OUTPATIENT)
Dept: TRANSPLANT | Facility: CLINIC | Age: 63
End: 2023-03-05
Payer: COMMERCIAL

## 2023-03-06 DIAGNOSIS — C91.01 ACUTE LYMPHOBLASTIC LEUKEMIA (ALL) IN REMISSION (H): ICD-10-CM

## 2023-03-06 DIAGNOSIS — T86.09 GVHD AS COMPLICATION OF BONE MARROW TRANSPLANT (H): ICD-10-CM

## 2023-03-06 DIAGNOSIS — D89.813 GVHD AS COMPLICATION OF BONE MARROW TRANSPLANT (H): ICD-10-CM

## 2023-03-06 RX ORDER — PREDNISONE 1 MG/1
4 TABLET ORAL DAILY
Qty: 120 TABLET | Refills: 1 | Status: SHIPPED | OUTPATIENT
Start: 2023-03-06 | End: 2023-08-10

## 2023-03-06 RX ORDER — PREDNISONE 5 MG/1
10 TABLET ORAL EVERY OTHER DAY
Qty: 60 TABLET | Refills: 3 | Status: SHIPPED | OUTPATIENT
Start: 2023-03-06 | End: 2023-12-11

## 2023-03-06 NOTE — TELEPHONE ENCOUNTER
I spoke to Nik.  He is feeling nauseated, fatigued and this has become more of a problem since lowering pred from 10 to 5 and perhaps slightly worse since lowering further to 4mg.  I recommended that he increase the pred to 10 mg on odd days and 4 mg on even days.  Assuming he feels better, we can consider tapering on an every other day basis.      DOMINIQUE BRITTON MD  March 6, 2023

## 2023-03-07 ENCOUNTER — CARE COORDINATION (OUTPATIENT)
Dept: TRANSPLANT | Facility: CLINIC | Age: 63
End: 2023-03-07
Payer: COMMERCIAL

## 2023-03-07 NOTE — PROGRESS NOTES
Re-faxed [facility] paperwork for wife Lamar (previously faxed on 2/15/23).  Per Lamar, HR states they did not receive last fax from 2/15/23.  Confirmed that fax was a success on rightfax and also emailed HR to have them confirm receipt of faxed [facility] paperwork - awaiting a response.

## 2023-03-15 DIAGNOSIS — H16.123 FILAMENTARY KERATITIS OF BOTH EYES: ICD-10-CM

## 2023-03-15 DIAGNOSIS — D89.813 GVHD AS COMPLICATION OF BONE MARROW TRANSPLANT (H): ICD-10-CM

## 2023-03-15 DIAGNOSIS — H04.129 DRY EYE: ICD-10-CM

## 2023-03-15 DIAGNOSIS — T86.09 GVHD AS COMPLICATION OF BONE MARROW TRANSPLANT (H): ICD-10-CM

## 2023-03-16 RX ORDER — FLUOROMETHOLONE 0.1 %
SUSPENSION, DROPS(FINAL DOSAGE FORM)(ML) OPHTHALMIC (EYE)
Qty: 5 ML | Refills: 2 | Status: SHIPPED | OUTPATIENT
Start: 2023-03-16 | End: 2023-07-27

## 2023-03-16 NOTE — TELEPHONE ENCOUNTER
Medication: fluorometholone 0.1 % eye drops,suspension (FML)   Requested directions: Place 1 drop into both eyes 2 times daily For total of 21 days then stop   Current directions on the medication list:  Place 1 drop into both eyes 2 times daily For total of 21 days then stop     Last Written Prescription Date:  12/1/22  Last Fill Quantity: 5 ml ,   # refills: 4    Last Office Visit: 12/1/22  Future Office visit: 4/6/23    Attending Provider: Larry  Last Clinic Note: A/P  1.) Dry Eye OU  -s/p BMT 08/2021  -Worsening dryness right eye>left eye, symptomatic for photophobia/tearing  -Failed: Restasis/Xiidra (stinging), plugs (scarred puncta)  -Now not tolerating BCL (does not retain in the eye). Tapering off oral steroids, eyes significantly worse. Right eye >>>> left eye  -Several filaments removed today right eye  -Strongly rec scleral lens given symptoms. Good fit with trial lens today. Wife successful I&R. Reviewed CL care and hygiene ([place] [employer], Addipabrit)  -Continue FML bid (IOP okay again today)      Requested to alternate pharmacy- [facility]. Remaining refills sent to requested pharmacy.

## 2023-03-17 DIAGNOSIS — E78.2 MIXED HYPERLIPIDEMIA: ICD-10-CM

## 2023-03-17 RX ORDER — ROSUVASTATIN CALCIUM 5 MG/1
5 TABLET, COATED ORAL DAILY
Qty: 30 TABLET | Refills: 3 | Status: SHIPPED | OUTPATIENT
Start: 2023-03-17 | End: 2023-05-02

## 2023-03-17 NOTE — LETTER
1/10/2023       RE: Nik Nava  [id number] [facility] [id number]     Dear Colleague,    Thank you for referring your patient, Nik Nava, to the [facility] [place] at [facility]. Please see a copy of my visit note below.      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     Oncologic history: The patient was diagnosed with Ph+ ALL in 2020 after presented with feeling unwell and found to have leukocytosis and blast in his blood. He was treated with Dex and Dasatinib then 2 cycles of vincristine, prednisone, daunorubicin, and pegasparaginase with intrathecal methotrexate. Last ly, he received 1 course of high dose Jaymie-C. He achieved CR with no MRD. Subsequently, he underwent allo sib NMA (flu/cy+TBI) PBSCT on  8/10/21. hematocrit-CI was 3.  The patient was noted to be in CR with BmBx at D100, D180 and 1Y showing 100% donor. He has not been started on TKI due to previous multiple active issues. Post Tx complication included cGVHD at skin, eyes, o liver (all Bx proven, except eyes, clinically) started since 2/22. The current active cGVHDD involved eyes and skin. He was on Tacrolimus for cGVHD but later discontinued due to NEGRA, hyperkalemia hypomagnesemia, tremors and cramps in 9/22. Sirolimus was started in 9/21/22 in replacement of Tac. Cr improved, but the presented on 10/11/22 with ocular and cGVHD flare, fluid retention and gain 5-6 kg. BMT thought this is sirolimus Tox? (of note UPCR was only 0.4 and normal SAlb). Sirolimus was then stopped. Prednisone was started at 40 mg with slow tapering. He was also started on Belumosudil on 10/18/22 (ROCK2  Assessment/Plan:    Annual physical examination  - History and physical examination done  - Pt was counseled to eat a heart healthy diet, to drink at least 2 L of water daily, to take a daily multivitamin and to exercise for at least 30 minutes of cardio exercise daily, for at least 5 days a week  - CBC, CMP, TSH and lipid panel were done on 3/10/2023 and all results were reviewed with patient and all questions answered  -He is up-to-date with his COVID-vaccine x2 and flu shot but does not remember when he had his last Tdap  - He we will be due for a colonoscopy next year  - follow up in 3 months    Hyperglycemia  -Blood glucose on 3/10/2023 was elevated at 131, up from 97  -We will order a hemoglobin A1c to evaluate him for a new diagnosis of diabetes mellitus    Hyperlipidemia  -Improving, last lipid panel done on 3/10/2023 showed a total cholesterol of 179, down from 208 on 1/17/2022, triglyceride of 101, up from 93, HDL of 45, down from 51 and LDL of 114, down from 138 on 1/17/2022  -Calculated 10-year ASCVD risk is 1 9% with recommendation for diet and exercise  -Patient was counseled to continue to eat a heart healthy diet and exercise    The 10-year ASCVD risk score (Lia CABRERA, et al , 2019) is: 1 9%    Values used to calculate the score:      Age: 38 years      Sex: Male      Is Non- : No      Diabetic: No      Tobacco smoker: No      Systolic Blood Pressure: 737 mmHg      Is BP treated: Yes      HDL Cholesterol: 45 mg/dL      Total Cholesterol: 179 mg/dL       Diagnoses and all orders for this visit:    Annual physical exam    Hyperglycemia  -     Hemoglobin A1C; Future    Other hyperlipidemia    BMI 40 0-44 9, adult (HCC)        BMI Counseling: Body mass index is 41 35 kg/m²  The BMI is above normal  Nutrition recommendations include consuming healthier snacks, limiting drinks that contain sugar, reducing intake of saturated and trans fat and reducing intake of cholesterol  Exercise recommendations include moderate physical activity 150 minutes/week  No pharmacotherapy was ordered  Patient referred to PCP  Rationale for BMI follow-up plan is due to patient being overweight or obese  Depression Screening and Follow-up Plan: Patient was screened for depression during today's encounter  They screened negative with a PHQ-2 score of 0  Subjective:      Patient ID: Yennifer Monroy is a 38 y o  male  HPI    Patient presents for an annual physical exam     Last annual physical exam- 1/17/22    Past medical history- htn, hyperglycemia, hld, hematuria    Past surgical history- anal fistulasx    Medications- see list    Allergies- NKDA    Diet-some junk but is trying to improve his diet, drinks 2 16 oz bottles of water daily     Exercise- not a lot    Alcohol use-none    Caffeine and soda use- soda, no coffee and no tea    Nicotine use-never    Recreational drug use- none    Work-    Sexual history, STD history and HIV testing- not sexually active and never had an STD, hiv testing - never    Gynecological history/Prostate health/testicular health history- LUTS    Colonoscopy- n/A    Immunization history- up to date with the covid shot x 2, flu shot, tdap - cant remember    Dental visit- every 6 months    Vision- glasses for myopia    Family history-  htn - parents  Dm - dad, ? Mom  Breast ca - mom  Cancer ? Primary - dad    Today, patient states that he has no complaints  He admits to polyuria which usually occurs after he takes his diuretic in the morning  He states that the polyuria stops at about midday  He also admits to weight gain and occasional but denies fever, chills, night sweats, headache, dizziness, nasal congestion, rhinorrhea, sore throat, sinus pain or pressure, cough, chest pain, shortness of breath, palpitations, nausea, vomiting, abdominal pain, diarrhea, constipation, myalgias, arthralgias, feelings of anxiety or depression       The inhibitors:Rho-associated coiled-coil kinase )  Other pertinent Hx:   - He had PE in the setting of receiving PEG asparaginase and was treated with Xarelto which is now completed.   - He had hyperferritinemia which now being followed closely. Not on iron chelators.  - He had COVID -19 in 5/22.  - CMV viremia  - Grave disease  - HTN  Kidney history: He had RCC s/p Rt nephrectomy in 2007.  The patient has baseline creatinine of 0.9-1 pretransplant.  He developed NEGRA in 9/21 with Cr peaked at 1.89 but then come down to baseline. He had NEGRA again in 9/22 with Cr peaked at 1.8, suspect that due to tacrolimus toxicity and it was stopped. Cr improved but now still fluctuates between 1.1-1.3.  The most recent creatinine was on 10/31/2022 at 1.08.  Serum albumin is 3.3. UCPR is 0.39 g/g on 10/18/22. UA on 9/12/21 and 10/18/22 showed no protein, no blood.  However UA on 9/12/2021 showed 8 hyaline cast.      11/1/22: He is doing well except that he still has LE edema. It has improved from when he was on Sirolimus but still quite a bit. Edema has actually started since 2021 but unclear when.But it is certainly getting worse lately when he was treated with high dose steroid and sirolimus.His cGVHD is o/eva all stable but not completely gone. He was just started Belumosudil on 10/18/22 for cGVHD. He just saw BMT today and they are decreasing his steroid.Amlodipine started since 8/21 and possibly related to his swelling. He has multiple SEs from steroid such as weight gain , central obesity, easily bruised, increased appetite and partial insomnia. He has some nocturia. He has no dysuria or hematuria. Recently, he had cat scrath incidence and being treated with doxycycline. He has abdominal distension and gained weight progressively. He drinks 120 Oz of tea per day. We started chlorthalidone and reduced amlodipine.   12/1-12/3/22: Was admitted for dyspnea and malaise concerning for belumosudil intolerance. However, at the same time  following portions of the patient's history were reviewed and updated as appropriate:   He  has a past medical history of Hypertension (12/2021)  He   Patient Active Problem List    Diagnosis Date Noted   • Hyperglycemia 03/17/2023   • BMI 40 0-44 9, adult ([facility] [address] ) 03/17/2023   • Leucocytosis 09/28/2022   • Skin tag of perianal region 07/27/2022   • Other hyperlipidemia 04/22/2022   • Other microscopic hematuria 04/22/2022   • Crystalluria 04/22/2022   • Other proteinuria 02/03/2022   • Transaminitis 02/03/2022   • Prediabetes 02/03/2022   • Elevated serum protein level 02/03/2022   • Annual physical exam 12/10/2021   • Closed low lateral malleolus fracture, left, with routine healing, subsequent encounter 03/19/2021     He  has a past surgical history that includes No past surgeries; pr anrct xm surg req anes general spi/edrl dx (N/A, 10/12/2022); and pr surg tx anal fistula subq (N/A, 10/12/2022)  His family history includes Cancer in his father; Diabetes in his father; Hypertension in his father and mother  He  reports that he has never smoked  He has never used smokeless tobacco  He reports current alcohol use  He reports that he does not use drugs  Current Outpatient Medications   Medication Sig Dispense Refill   • amLODIPine (NORVASC) 10 mg tablet Take 1 tablet (10 mg total) by mouth daily 90 tablet 1   • losartan-hydrochlorothiazide (HYZAAR) 100-25 MG per tablet Take 1 tablet by mouth daily 90 tablet 0     No current facility-administered medications for this visit  Current Outpatient Medications on File Prior to Visit   Medication Sig   • amLODIPine (NORVASC) 10 mg tablet Take 1 tablet (10 mg total) by mouth daily   • losartan-hydrochlorothiazide (HYZAAR) 100-25 MG per tablet Take 1 tablet by mouth daily     No current facility-administered medications on file prior to visit  He has No Known Allergies       Review of Systems   Constitutional: Positive for unexpected weight change (wt gain)   Negative he also had flu A positive. He was taken off of amlodipine on 12/6/22. And subsequently chlorthalidone was off in 12/13/22 when Na was 128. Na improved afterwards.   1/10/23:  He feels good today. His BP are now very good 110-130/70 mmHg. He is not on any BP medication ayt this time. He is taking Belumosidil. Symptoms of cGVHD have been improving.  He has some LE edema and easily bruised likely from steroid/belumosudil.   Labs: Creatinine 1.11, potassium 4.8,, dioxide 28, phosphorus 2.3, albumin 4.2, calcium 10.1.  Hemoglobin 11.3, platelet 140. UA pending.     Past medical history  Past Medical History:   Diagnosis Date     ALL (acute lymphocytic leukemia) (H)      Cancer (H)     renal cell     CKD (chronic kidney disease)      H/O peripheral stem cell transplant (H)      Thyroid disease        Past surgical history  Past Surgical History:   Procedure Laterality Date     BACK SURGERY  2017     BONE MARROW BIOPSY, BONE SPECIMEN, NEEDLE/TROCAR Left 9/2/2021    Procedure: BIOPSY, BONE MARROW;  Surgeon: Jailyn Ny APRN CNP;  Location: [facility] OR     BONE MARROW BIOPSY, BONE SPECIMEN, NEEDLE/TROCAR Left 11/15/2021    Procedure: BIOPSY, BONE MARROW;  Surgeon: Socrates De La Torre;  Location: [facility] OR     BONE MARROW BIOPSY, BONE SPECIMEN, NEEDLE/TROCAR Left 2/7/2022    Procedure: BIOPSY, BONE MARROW;  Surgeon: Renetta Wiggins PA-C;  Location: [facility] OR     BONE MARROW BIOPSY, BONE SPECIMEN, NEEDLE/TROCAR Left 8/18/2022    Procedure: BIOPSY, BONE MARROW;  Surgeon: Lorrie Yap PA-C;  Location: [facility] OR     BRONCHOSCOPY (RIGID OR FLEXIBLE), DIAGNOSTIC N/A 4/29/2022    Procedure: BRONCHOSCOPY, WITH BRONCHOALVEOLAR LAVAGE;  Surgeon: Murtaza Garcia MD;  Location: [facility] GI     IR CVC TUNNEL PLACEMENT > 5 YRS OF AGE  8/4/2021     IR CVC TUNNEL REMOVAL LEFT  9/2/2021     IR LIVER BIOPSY PERCUTANEOUS  2/21/2022     PERCUTANEOUS BIOPSY LIVER N/A 2/21/2022    Procedure: NEEDLE BIOPSY, LIVER, PERCUTANEOUS;  Surgeon:  Trace Castellanos MD;  Location: [facility] OR         Review of Systems:   14 systems were reviewed and all negative except as mentioned above.   Current Medications:  Current Outpatient Medications   Medication     acyclovir (ZOVIRAX) 800 MG tablet     amLODIPine (NORVASC) 2.5 MG tablet     azithromycin (ZITHROMAX) 250 MG tablet     Belumosudil Mesylate 200 MG TABS     Carboxymethylcell-Glycerin PF (REFRESH RELIEVA PF) 0.5-1 % SOLN     carboxymethylcellulose PF (CARBOXYMETHYLCELLULOSE SODIUM) 0.5 % ophthalmic solution     chlorthalidone (HYGROTON) 25 MG tablet     clobetasol (TEMOVATE) 0.05 % external ointment     dexamethasone alcohol-free (DECADRON) 0.1 MG/ML solution     erythromycin (ROMYCIN) 5 MG/GM ophthalmic ointment     escitalopram (LEXAPRO) 10 MG tablet     fluorometholone (FML LIQUIFILM) 0.1 % ophthalmic suspension     levothyroxine (SYNTHROID/LEVOTHROID) 150 MCG tablet     LORazepam (ATIVAN) 1 MG tablet     magnesium oxide (MAG-OX) 400 MG tablet     nicotine polacrilex (NICORETTE) 4 MG gum     omeprazole (PRILOSEC) 40 MG DR capsule     posaconazole (NOXAFIL) 100 MG EC tablet     predniSONE (DELTASONE) 10 MG tablet     predniSONE (DELTASONE) 5 MG tablet     rosuvastatin (CRESTOR) 5 MG tablet     sennosides (SENOKOT) 8.6 MG tablet     sodium chloride 0.9 % neb solution     traZODone (DESYREL) 50 MG tablet     No current facility-administered medications for this visit.       Physical Exam:   There were no vitals taken for this visit.   There is no height or weight on file to calculate BMI.    GENERAL APPEARANCE: Alert, not in acute distress; pleasant.   EYES:  Not pale conjunctiva, pupils equal  HENT: Mouth without ulcers or lesions  PULM: lungs clear to auscultation bilaterally, equal air movement, no clubbing  CV: regular rhythm, normal rate, no rub     -JVD no distended.      -edema: trace edema  GI: soft,  - tender, no distended, bowel sounds are present  INTEGUMENT: No rash  NEURO:  Non focal. No  for activity change, chills, fatigue and fever  HENT: Negative for ear pain, postnasal drip, rhinorrhea, sinus pressure and sore throat  Eyes: Negative for pain  Respiratory: Negative for cough, choking, chest tightness, shortness of breath and wheezing  Cardiovascular: Negative for chest pain, palpitations and leg swelling  Gastrointestinal: Negative for abdominal pain, constipation, diarrhea, nausea and vomiting  Endocrine: Positive for polyuria  Genitourinary: Negative for dysuria and hematuria  Musculoskeletal: Negative for arthralgias, back pain, gait problem, joint swelling, myalgias and neck stiffness  Skin: Negative for pallor and rash  Neurological: Negative for dizziness, tremors, seizures, syncope, light-headedness and headaches  Hematological: Negative for adenopathy  Psychiatric/Behavioral: Positive for sleep disturbance (occasionally )  Negative for behavioral problems  Objective:      /66 (BP Location: Left arm, Patient Position: Sitting, Cuff Size: Large)   Pulse 76   Temp 98 5 °F (36 9 °C)   Ht 5' 9" (1 753 m)   Wt 127 kg (280 lb)   SpO2 96%   BMI 41 35 kg/m²          Physical Exam  Constitutional:       General: He is not in acute distress  Appearance: He is well-developed  He is obese  He is not diaphoretic  Comments: BMI is 41 35   HENT:      Head: Normocephalic and atraumatic  Right Ear: External ear normal  There is no impacted cerumen (some cerumen b/l)  Left Ear: External ear normal  There is no impacted cerumen  Nose: Nose normal       Mouth/Throat:      Mouth: Mucous membranes are dry  Pharynx: Posterior oropharyngeal erythema (with Mallampati class 3 oropharynx) present  No oropharyngeal exudate  Eyes:      General: No scleral icterus  Right eye: No discharge  Left eye: No discharge  Conjunctiva/sclera: Conjunctivae normal       Pupils: Pupils are equal, round, and reactive to light     Neck: Thyroid: No thyromegaly  Vascular: No JVD  Trachea: No tracheal deviation  Cardiovascular:      Rate and Rhythm: Normal rate and regular rhythm  Heart sounds: Normal heart sounds  No murmur heard  No friction rub  No gallop  Pulmonary:      Effort: Pulmonary effort is normal  No respiratory distress  Breath sounds: Normal breath sounds  No wheezing or rales  Chest:      Chest wall: No tenderness  Abdominal:      General: Bowel sounds are normal  There is no distension  Palpations: Abdomen is soft  There is no mass  Tenderness: There is no abdominal tenderness  There is no guarding or rebound  Musculoskeletal:         General: No tenderness  Normal range of motion  Cervical back: Normal range of motion and neck supple  Thoracic back: Deformity (thoracolumbar kyphosis) present  Right ankle: Swelling present  Left ankle: Swelling (trace pitting pedal edema b/l) present  Lymphadenopathy:      Cervical: No cervical adenopathy  Skin:     General: Skin is warm and dry  Coloration: Skin is not pale  Findings: No erythema or rash  Neurological:      Mental Status: He is alert and oriented to person, place, and time  Cranial Nerves: No cranial nerve deficit  Motor: No abnormal muscle tone  Coordination: Coordination normal       Deep Tendon Reflexes: Reflexes are normal and symmetric        Comments: Cranial nerves 2-12 are intact bilaterally  Muscle strength is 5/5 in all extremities  Sensation is intact in bilateral face and extremities  Rapid alternating movement and finger-to-nose pointing test intact   Deep tendon reflexes are 2+ bilaterally  Gait is intact       Psychiatric:         Behavior: Behavior normal            Appointment on 03/10/2023   Component Date Value Ref Range Status   • WBC 03/10/2023 10 50 (H)  4 31 - 10 16 Thousand/uL Final   • RBC 03/10/2023 4 59  3 88 - 5 62 Million/uL Final   • Hemoglobin 03/10/2023 14 3 anshu.     Labs:   All labs reviewed by me  Last Renal Panel:  Sodium   Date Value Ref Range Status   12/27/2022 137 136 - 145 mmol/L Final   07/08/2021 139 133 - 144 mmol/L Final     Potassium   Date Value Ref Range Status   12/27/2022 4.4 3.4 - 5.3 mmol/L Final   11/15/2022 5.0 3.4 - 5.3 mmol/L Final   07/08/2021 3.9 3.4 - 5.3 mmol/L Final     Potassium POCT   Date Value Ref Range Status   12/01/2022 4.8 3.4 - 5.3 mmol/L Final     Chloride   Date Value Ref Range Status   12/27/2022 101 98 - 107 mmol/L Final   11/15/2022 101 94 - 109 mmol/L Final   07/08/2021 108 94 - 109 mmol/L Final     Carbon Dioxide   Date Value Ref Range Status   07/08/2021 23 20 - 32 mmol/L Final     Carbon Dioxide (CO2)   Date Value Ref Range Status   12/27/2022 25 22 - 29 mmol/L Final   11/15/2022 30 20 - 32 mmol/L Final     Anion Gap   Date Value Ref Range Status   12/27/2022 11 7 - 15 mmol/L Final   11/15/2022 3 3 - 14 mmol/L Final   07/08/2021 7 3 - 14 mmol/L Final     Glucose   Date Value Ref Range Status   12/27/2022 130 (H) 70 - 99 mg/dL Final   11/15/2022 113 (H) 70 - 99 mg/dL Final   07/08/2021 107 (H) 70 - 99 mg/dL Final     GLUCOSE BY METER POCT   Date Value Ref Range Status   12/03/2022 116 (H) 70 - 99 mg/dL Final     Urea Nitrogen   Date Value Ref Range Status   12/27/2022 40.0 (H) 8.0 - 23.0 mg/dL Final   11/15/2022 51 (H) 7 - 30 mg/dL Final   07/08/2021 24 7 - 30 mg/dL Final     Creatinine   Date Value Ref Range Status   12/27/2022 1.35 (H) 0.67 - 1.17 mg/dL Final   07/08/2021 0.94 0.66 - 1.25 mg/dL Final     GFR Estimate   Date Value Ref Range Status   12/27/2022 59 (L) >60 mL/min/1.73m2 Final     Comment:     Effective December 21, 2021 eGFRcr in adults is calculated using the 2021 CKD-EPI creatinine equation which includes age and gender (Omar byers al., NEJM, DOI: 10.1056/IMQBij4904203)   07/08/2021 86 >60 mL/min/[1.73_m2] Final     Comment:     Non  GFR Calc  Starting 12/18/2018, serum creatinine based  12 0 - 17 0 g/dL Final   • Hematocrit 03/10/2023 42 5  36 5 - 49 3 % Final   • MCV 03/10/2023 93  82 - 98 fL Final   • MCH 03/10/2023 31 2  26 8 - 34 3 pg Final   • MCHC 03/10/2023 33 6  31 4 - 37 4 g/dL Final   • RDW 03/10/2023 13 4  11 6 - 15 1 % Final   • MPV 03/10/2023 10 2  8 9 - 12 7 fL Final   • Platelets 33/08/9447 316  149 - 390 Thousands/uL Final   • nRBC 03/10/2023 0  /100 WBCs Final   • Neutrophils Relative 03/10/2023 64  43 - 75 % Final   • Immat GRANS % 03/10/2023 2  0 - 2 % Final   • Lymphocytes Relative 03/10/2023 22  14 - 44 % Final   • Monocytes Relative 03/10/2023 9  4 - 12 % Final   • Eosinophils Relative 03/10/2023 2  0 - 6 % Final   • Basophils Relative 03/10/2023 1  0 - 1 % Final   • Neutrophils Absolute 03/10/2023 6 79  1 85 - 7 62 Thousands/µL Final   • Immature Grans Absolute 03/10/2023 0 17  0 00 - 0 20 Thousand/uL Final   • Lymphocytes Absolute 03/10/2023 2 33  0 60 - 4 47 Thousands/µL Final   • Monocytes Absolute 03/10/2023 0 89  0 17 - 1 22 Thousand/µL Final   • Eosinophils Absolute 03/10/2023 0 24  0 00 - 0 61 Thousand/µL Final   • Basophils Absolute 03/10/2023 0 08  0 00 - 0 10 Thousands/µL Final   • TSH 3RD GENERATON 03/10/2023 2 170  0 450 - 4 500 uIU/mL Final    Adult TSH (3rd generation) reference range follows the recommended guidelines of the American Thyroid Association, January, 2020  • Sodium 03/10/2023 138  135 - 147 mmol/L Final   • Potassium 03/10/2023 3 9  3 5 - 5 3 mmol/L Final   • Chloride 03/10/2023 104  96 - 108 mmol/L Final   • CO2 03/10/2023 25  21 - 32 mmol/L Final   • ANION GAP 03/10/2023 9  4 - 13 mmol/L Final   • BUN 03/10/2023 15  5 - 25 mg/dL Final   • Creatinine 03/10/2023 0 72  0 60 - 1 30 mg/dL Final    Standardized to IDMS reference method   • Glucose, Fasting 03/10/2023 131 (H)  65 - 99 mg/dL Final    Specimen collection should occur prior to Sulfasalazine administration due to the potential for falsely depressed results   Specimen collection should estimated GFR (eGFR) will be   calculated using the Chronic Kidney Disease Epidemiology Collaboration   (CKD-EPI) equation.       Calcium   Date Value Ref Range Status   12/27/2022 10.1 8.8 - 10.2 mg/dL Final   07/08/2021 8.9 8.5 - 10.1 mg/dL Final     Phosphorus   Date Value Ref Range Status   12/01/2022 3.1 2.5 - 4.5 mg/dL Final     Albumin   Date Value Ref Range Status   12/27/2022 4.0 3.5 - 5.2 g/dL Final   11/15/2022 3.6 3.4 - 5.0 g/dL Final   07/08/2021 3.7 3.4 - 5.0 g/dL Final       Imaging:  I reviewed imaging studies.     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 However, he had worsening swelling and increased in Cr to 1.39. At that time, it was concerned that he may have sirolimus toxicity. But he only had UCPR 0.39 g/g at that time. At that time, I started low dose chlorthalidone and reduced dose amlodipine. His swelling improved but he developed hypotension so now he is not on any BP medications. His Cr also improved to 1.1 which is closer to his baseline. UA is pending.     At this time, we will continue to watch his kidney functions and blood pressure. Discussed with him and his wife about ways to keep kidney healthy.   - Continue to hold blood pressure medication and monitor blood pressure 2-3 times per week and weight daily  - Low salt diet; NA 2000 mg per day  - Follow-up in 4 months with labs     Follow-up in 4 months with labs    I spent  45 minutes on the date  occur prior to Sulfapyridine administration due to the potential for falsely elevated results  • Calcium 03/10/2023 9 4  8 3 - 10 1 mg/dL Final   • AST 03/10/2023 41  5 - 45 U/L Final    Specimen collection should occur prior to Sulfasalazine administration due to the potential for falsely depressed results  • ALT 03/10/2023 85 (H)  12 - 78 U/L Final    Specimen collection should occur prior to Sulfasalazine and/or Sulfapyridine administration due to the potential for falsely depressed results  • Alkaline Phosphatase 03/10/2023 70  46 - 116 U/L Final   • Total Protein 03/10/2023 7 7  6 4 - 8 4 g/dL Final   • Albumin 03/10/2023 3 7  3 5 - 5 0 g/dL Final   • Total Bilirubin 03/10/2023 0 63  0 20 - 1 00 mg/dL Final    Use of this assay is not recommended for patients undergoing treatment with eltrombopag due to the potential for falsely elevated results  • eGFR 03/10/2023 113  ml/min/1 73sq m Final   • Cholesterol 03/10/2023 179  See Comment mg/dL Final    Cholesterol:         Pediatric <18 Years        Desirable          <170 mg/dL      Borderline High    170-199 mg/dL      High               >=200 mg/dL        Adult >=18 Years            Desirable         <200 mg/dL      Borderline High   200-239 mg/dL      High              >239 mg/dL     • Triglycerides 03/10/2023 101  See Comment mg/dL Final    Triglyceride:     0-9Y            <75mg/dL     10Y-17Y         <90 mg/dL       >=18Y     Normal          <150 mg/dL     Borderline High 150-199 mg/dL     High            200-499 mg/dL        Very High       >499 mg/dL    Specimen collection should occur prior to N-Acetylcysteine or Metamizole administration due to the potential for falsely depressed results  • HDL, Direct 03/10/2023 45  >=40 mg/dL Final    Specimen collection should occur prior to Metamizole administration due to the potential for falsley depressed results     • LDL Calculated 03/10/2023 114 (H)  0 - 100 mg/dL Final    LDL Cholesterol:     of the encounter doing chart review, history and exam, documentation and further activities as noted above. 20 minutes of this visit is dedicated to direct patient interaction via face to face.     Keegan Chew MD on 01/10/2023         Optimal           <100 mg/dl     Near Optimal      100-129 mg/dl     Above Optimal       Borderline High 130-159 mg/dl       High            160-189 mg/dl       Very High       >189 mg/dl         This screening LDL is a calculated result  It does not have the accuracy of the Direct Measured LDL in the monitoring of patients with hyperlipidemia and/or statin therapy  Direct Measure LDL (YKQ025) must be ordered separately in these patients     • Non-HDL-Chol (CHOL-HDL) 03/10/2023 134  mg/dl Final   • Color, UA 03/10/2023 Light Yellow   Final   • Clarity, UA 03/10/2023 Clear   Final   • Specific Gravity, UA 03/10/2023 1 022  1 003 - 1 030 Final   • pH, UA 03/10/2023 6 5  4 5, 5 0, 5 5, 6 0, 6 5, 7 0, 7 5, 8 0 Final   • Leukocytes, UA 03/10/2023 Negative  Negative Final   • Nitrite, UA 03/10/2023 Negative  Negative Final   • Protein, UA 03/10/2023 Trace (A)  Negative mg/dl Final   • Glucose, UA 03/10/2023 Negative  Negative mg/dl Final   • Ketones, UA 03/10/2023 Negative  Negative mg/dl Final   • Urobilinogen, UA 03/10/2023 <2 0  <2 0 mg/dl mg/dl Final   • Bilirubin, UA 03/10/2023 Negative  Negative Final   • Occult Blood, UA 03/10/2023 Small (A)  Negative Final   • RBC, UA 03/10/2023 2-4 (A)  None Seen, 1-2 /hpf Final   • WBC, UA 03/10/2023 1-2  None Seen, 1-2 /hpf Final   • Epithelial Cells 03/10/2023 None Seen  None Seen, Occasional /hpf Final   • Bacteria, UA 03/10/2023 None Seen  None Seen, Occasional /hpf Final   • MUCUS THREADS 03/10/2023 Occasional (A)  None Seen Final   Admission on 10/12/2022, Discharged on 10/12/2022   Component Date Value Ref Range Status   • Case Report 10/12/2022    Final                    Value:Surgical Pathology Report                         Case: [id number]                                   Authorizing Provider:  Amrita Torres MD   Collected:           10/12/2022 0936              Ordering Location:     [address] [place] [address]      Received:            10/12/2022 [facility]                                                      Pathologist:           Tari Moore MD                                                             Specimen:    Skin, Cyst/Tag/Debridement, Perianal Skin Tag                                             • Final Diagnosis 10/12/2022    Final                    Value: This result contains rich text formatting which cannot be displayed here  • Note 10/12/2022    Final                    Value: This result contains rich text formatting which cannot be displayed here  • Additional Information 10/12/2022    Final                    Value: This result contains rich text formatting which cannot be displayed here  • Gross Description 10/12/2022    Final                    Value: This result contains rich text formatting which cannot be displayed here  • Clinical Information 10/12/2022    Final                    Value:perianal skin tag with purulent drainage in the right anterior lateral location  This proximally 5 cm from the anal verge  There was scarring near the anterior midline concerning for primary opening for fistula in ANO  Lab on 09/16/2022   Component Date Value Ref Range Status   • Total Bilirubin 09/16/2022 0 32  0 20 - 1 00 mg/dL Final    Use of this assay is not recommended for patients undergoing treatment with eltrombopag due to the potential for falsely elevated results  • Bilirubin, Direct 09/16/2022 0 06  0 00 - 0 20 mg/dL Final   • Alkaline Phosphatase 09/16/2022 73  46 - 116 U/L Final   • AST 09/16/2022 56 (H)  5 - 45 U/L Final    Slightly Hemolyzed; Results May be Affected  Specimen collection should occur prior to Sulfasalazine and/or Sulfapyridine administration due to the potential for falsely depressed results      • ALT 09/16/2022 108 (H)  12 - 78 U/L Final    Specimen collection should occur prior to Sulfasalazine and/or Sulfapyridine administration due to the potential for falsely depressed results  • Total Protein 09/16/2022 8 1  6 4 - 8 4 g/dL Final   • Albumin 09/16/2022 3 6  3 5 - 5 0 g/dL Final   • Sodium 09/16/2022 138  135 - 147 mmol/L Final   • Potassium 09/16/2022 4 1  3 5 - 5 3 mmol/L Final    Slightly Hemolyzed; Results May be Affected   • Chloride 09/16/2022 108  96 - 108 mmol/L Final   • CO2 09/16/2022 23  21 - 32 mmol/L Final   • ANION GAP 09/16/2022 7  4 - 13 mmol/L Final   • BUN 09/16/2022 11  5 - 25 mg/dL Final   • Creatinine 09/16/2022 0 77  0 60 - 1 30 mg/dL Final    Standardized to IDMS reference method   • Glucose, Fasting 09/16/2022 97  65 - 99 mg/dL Final    Specimen collection should occur prior to Sulfasalazine administration due to the potential for falsely depressed results  Specimen collection should occur prior to Sulfapyridine administration due to the potential for falsely elevated results     • Calcium 09/16/2022 9 4  8 3 - 10 1 mg/dL Final   • eGFR 09/16/2022 111  ml/min/1 73sq m Final   • WBC 09/16/2022 12 10 (H)  4 31 - 10 16 Thousand/uL Final   • RBC 09/16/2022 4 60  3 88 - 5 62 Million/uL Final   • Hemoglobin 09/16/2022 14 1  12 0 - 17 0 g/dL Final   • Hematocrit 09/16/2022 44 4  36 5 - 49 3 % Final   • MCV 09/16/2022 97  82 - 98 fL Final   • MCH 09/16/2022 30 7  26 8 - 34 3 pg Final   • MCHC 09/16/2022 31 8  31 4 - 37 4 g/dL Final   • RDW 09/16/2022 13 2  11 6 - 15 1 % Final   • MPV 09/16/2022 10 5  8 9 - 12 7 fL Final   • Platelets 49/77/2982 341  149 - 390 Thousands/uL Final   • nRBC 09/16/2022 0  /100 WBCs Final   • Neutrophils Relative 09/16/2022 61  43 - 75 % Final   • Immat GRANS % 09/16/2022 2  0 - 2 % Final   • Lymphocytes Relative 09/16/2022 24  14 - 44 % Final   • Monocytes Relative 09/16/2022 9  4 - 12 % Final   • Eosinophils Relative 09/16/2022 3  0 - 6 % Final   • Basophils Relative 09/16/2022 1  0 - 1 % Final   • Neutrophils Absolute 09/16/2022 7 35  1 85 - 7 62 Thousands/µL Final   • Immature Grans Absolute 09/16/2022 0 28 (H)  0 00 - 0 20 Thousand/uL Final   • Lymphocytes Absolute 09/16/2022 2 94  0 60 - 4 47 Thousands/µL Final   • Monocytes Absolute 09/16/2022 1 05  0 17 - 1 22 Thousand/µL Final   • Eosinophils Absolute 09/16/2022 0 36  0 00 - 0 61 Thousand/µL Final   • Basophils Absolute 09/16/2022 0 12 (H)  0 00 - 0 10 Thousands/µL Final   Office Visit on 07/15/2022   Component Date Value Ref Range Status   • LEUKOCYTE ESTERASE,UA 07/15/2022 neg   Final   • Angy Bosworth 07/15/2022 neg   Final   • SL AMB POCT UROBILINOGEN 07/15/2022 0 2   Final   • POCT URINE PROTEIN 07/15/2022 neg   Final   •  PH,UA 07/15/2022 5 0   Final   • BLOOD,UA 07/15/2022 large   Final   • SPECIFIC GRAVITY,UA 07/15/2022 1 015   Final   • KETONES,UA 07/15/2022 neg   Final   • BILIRUBIN,UA 07/15/2022 neg   Final   • GLUCOSE, UA 07/15/2022 neg   Final   •  COLOR,UA 07/15/2022 yellow   Final   • CLARITY,UA 07/15/2022 clear   Final   • Color, UA 07/15/2022 Light Yellow   Final   • Clarity, UA 07/15/2022 Clear   Final   • Specific Gravity, UA 07/15/2022 1 017  1 003 - 1 030 Final   • pH, UA 07/15/2022 6 0  4 5, 5 0, 5 5, 6 0, 6 5, 7 0, 7 5, 8 0 Final   • Leukocytes, UA 07/15/2022 Negative  Negative Final   • Nitrite, UA 07/15/2022 Negative  Negative Final   • Protein, UA 07/15/2022 Negative  Negative mg/dl Final   • Glucose, UA 07/15/2022 Negative  Negative mg/dl Final   • Ketones, UA 07/15/2022 Negative  Negative mg/dl Final   • Urobilinogen, UA 07/15/2022 <2 0  <2 0 mg/dl mg/dl Final   • Bilirubin, UA 07/15/2022 Negative  Negative Final   • Occult Blood, UA 07/15/2022 Small (A)  Negative Final   • RBC, UA 07/15/2022 1-2  None Seen, 1-2 /hpf Final   • WBC, UA 07/15/2022 2-4 (A)  None Seen, 1-2 /hpf Final   • Epithelial Cells 07/15/2022 Occasional  None Seen, Occasional /hpf Final   • Bacteria, UA 07/15/2022 None Seen  None Seen, Occasional /hpf Final   • MUCUS THREADS 07/15/2022 Moderate (A)  None Seen Final   • Urine Culture 07/15/2022 No Growth <1000 cfu/mL   Final Appointment on 05/07/2022   Component Date Value Ref Range Status   • Color, UA 05/07/2022 Light Yellow   Final   • Clarity, UA 05/07/2022 Clear   Final   • Specific Gravity, UA 05/07/2022 1 013  1 003 - 1 030 Final   • pH, UA 05/07/2022 5 5  4 5, 5 0, 5 5, 6 0, 6 5, 7 0, 7 5, 8 0 Final   • Leukocytes, UA 05/07/2022 Negative  Negative Final   • Nitrite, UA 05/07/2022 Negative  Negative Final   • Protein, UA 05/07/2022 Negative  Negative mg/dl Final   • Glucose, UA 05/07/2022 Negative  Negative mg/dl Final   • Ketones, UA 05/07/2022 Negative  Negative mg/dl Final   • Urobilinogen, UA 05/07/2022 <2 0  <2 0 mg/dl mg/dl Final   • Bilirubin, UA 05/07/2022 Negative  Negative Final   • Occult Blood, UA 05/07/2022 Small (A)  Negative Final   • RBC, UA 05/07/2022 1-2  None Seen, 1-2 /hpf Final   • WBC, UA 05/07/2022 1-2  None Seen, 1-2 /hpf Final   • Epithelial Cells 05/07/2022 None Seen  None Seen, Occasional /hpf Final   • Bacteria, UA 05/07/2022 None Seen  None Seen, Occasional /hpf Final   • MUCUS THREADS 05/07/2022 Occasional (A)  None Seen Final   • Hyaline Casts, UA 05/07/2022 0-3 (A)  None Seen /lpf Final   Appointment on 04/22/2022   Component Date Value Ref Range Status   • Color, UA 04/22/2022 Light Orange   Final   • Clarity, UA 04/22/2022 Extra Turbid   Final   • Specific Gravity, UA 04/22/2022 1 021  1 003 - 1 030 Final   • pH, UA 04/22/2022 5 5  4 5, 5 0, 5 5, 6 0, 6 5, 7 0, 7 5, 8 0 Final   • Leukocytes, UA 04/22/2022 Negative  Negative Final   • Nitrite, UA 04/22/2022 Negative  Negative Final   • Protein, UA 04/22/2022 Trace (A)  Negative mg/dl Final   • Glucose, UA 04/22/2022 Negative  Negative mg/dl Final   • Ketones, UA 04/22/2022 Negative  Negative mg/dl Final   • Urobilinogen, UA 04/22/2022 <2 0  <2 0 mg/dl mg/dl Final   • Bilirubin, UA 04/22/2022 Negative  Negative Final   • Occult Blood, UA 04/22/2022 Small (A)  Negative Final   • RBC, UA 04/22/2022 1-2  None Seen, 1-2 /hpf Final   • WBC, UA 04/22/2022 1-2  None Seen, 1-2 /hpf Final   • Epithelial Cells 04/22/2022 Occasional  None Seen, Occasional /hpf Final   • Bacteria, UA 04/22/2022 Moderate (A)  None Seen, Occasional /hpf Final   • MUCUS THREADS 04/22/2022 Moderate (A)  None Seen Final   • Hepatitis B Surface Ag 04/22/2022 Non-reactive  Non-reactive, NonReactive - Confirmed Final   • Hepatitis C Ab 04/22/2022 Non-reactive  Non-reactive Final   • Hep B C IgM 04/22/2022 Non-reactive  Non-reactive Final   • Hep B Core Total Ab 04/22/2022 Non-reactive  Non-reactive Final   • Sodium 04/22/2022 141  136 - 145 mmol/L Final   • Potassium 04/22/2022 4 3  3 5 - 5 3 mmol/L Final   • Chloride 04/22/2022 109 (H)  100 - 108 mmol/L Final   • CO2 04/22/2022 28  21 - 32 mmol/L Final   • ANION GAP 04/22/2022 4  4 - 13 mmol/L Final   • BUN 04/22/2022 12  5 - 25 mg/dL Final   • Creatinine 04/22/2022 0 78  0 60 - 1 30 mg/dL Final    Standardized to IDMS reference method   • Glucose, Fasting 04/22/2022 112 (H)  65 - 99 mg/dL Final    Specimen collection should occur prior to Sulfasalazine administration due to the potential for falsely depressed results  Specimen collection should occur prior to Sulfapyridine administration due to the potential for falsely elevated results  • Calcium 04/22/2022 9 5  8 3 - 10 1 mg/dL Final   • AST 04/22/2022 53 (H)  5 - 45 U/L Final    Specimen collection should occur prior to Sulfasalazine administration due to the potential for falsely depressed results  • ALT 04/22/2022 122 (H)  12 - 78 U/L Final    Specimen collection should occur prior to Sulfasalazine and/or Sulfapyridine administration due to the potential for falsely depressed results      • Alkaline Phosphatase 04/22/2022 80  46 - 116 U/L Final   • Total Protein 04/22/2022 8 2  6 4 - 8 2 g/dL Final   • Albumin 04/22/2022 3 6  3 5 - 5 0 g/dL Final   • Total Bilirubin 04/22/2022 0 49  0 20 - 1 00 mg/dL Final    Use of this assay is not recommended for patients undergoing treatment with eltrombopag due to the potential for falsely elevated results     • eGFR 04/22/2022 110  ml/min/1 73sq m Final

## 2023-03-27 NOTE — PROGRESS NOTES
"[facility]   PM&R clinic note        Interval history:     Nik Nava presents to clinic today for follow up reg his rehab needs.   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  Was last seen in clinic on 12/20/22 via virtual phone visit  Recommendations included:  1. Therapy/equipment/braces:  1. We will try to get virtual physical therapy ordered for him.  If this does not work we can discuss with the patient whether they would prefer home PT or come into the clinic.  2. Medications:  1. Continue lorazepam as needed.  2. Trial melatonin 5 mg each evening. Advised 2 hours prior to bed time.   3. Follow up: 3-month virtual follow-up      Oncology History:  Per Dr. Chris Cote BMT note on 12/2022  \"1.  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 S/p allo sib PBSCT. (ABO matched)  HCT-CI score: 3 (prior solid tumor)  Day +100 bone marrow biopsy is 100% donor with no morphologic or flow cytometric evidence of leukemia BCR abl is pending.  - Day +180 restaging BM bx- still in CR  100% donor;  2/16/22 BCR ABL major breakpoint undetectable.   - 1 year restaging 8/18/2022: Markedly hypocellular bone marrow [less than 10% cellular] with maturing trilineage hematopoiesis and no definite morphologic or immunophenotypic evidence for involvement by B lymphoblastic leukemia. BCRABL1 PCR not detected, bone marrow % donor. FISH NORMAL No evidence of BCR-ABL1 fusion  - Maintenance with TKI posttransplantation given his Ph positive ALL that have not been started previously presumed secondary to multiple active issues specifically infections and COVID.  Per Avila Tobar: will reconsider this patient will think about whether this is something he would like to consider he was previously on Dasatinib, we would start on a low dose and increase as tolerated.  This is on hold now pending active GVHD therapy, we discussed on this visit " "10/18 in agreement with holding off.\"    Medical issues since last visit, tested positive for COVID-19 in January.        Symptoms,  Patient was seen today for a return virtual visit.   He had one PT session so far and has a plan for once weekly sessions moving forward.   He had COVID in January, and it took 4 weeks to get over the infection.   About 1 month ago, he slipped on the ice and fell onto his bottom.  He states that he had some soreness in his bottom for some time, but then 2 weeks later he got up 1 morning after sleeping and had significant pain in his back.  He went to the chiropractor on 3/13/2023 and ended up doing 3 sessions with a chiropractor that week with no relief in his pain.  He then went to [facility] orthopedics urgent care, and saw a PA there who did x-rays which did not demonstrate any acute findings.  He was sent to physical therapy, and started physical therapy last week when he had his first session.  He has another session planned for today, and sessions ongoing weekly.  He feels that his back pain is still pretty significant and has not really improved.  He was prescribed oxycodone when he went to [facility] orthopedics, and he takes this as needed with relief of pain but states that it makes him feel very groggy so he does not like the feeling.  He has a follow-up appointment with the providers at [facility] orthopedics next week, and per his history may obtain more detailed imaging such as an MRI to evaluate for any other injuries.  Besides that fall, he has not had any other falls since his last visit.  He denies any other concerns at today's visit.  He feels that his appetite has been good and his energy levels overall are good otherwise.      Therapies/HEP,  Started outpatient physical therapy for low back pain after a fall on the ice.  Being seen at [facility] orthopedics.      Functionally,   Remains independent with mobility, ADLs and IADLs.      Social history is " unchanged.      Medications:  Current Outpatient Medications   Medication Sig Dispense Refill     acyclovir (ZOVIRAX) 800 MG tablet Take 1 tablet (800 mg) by mouth 2 times daily 60 tablet 1     azithromycin (ZITHROMAX) 250 MG tablet Take 1 tablet (250 mg) by mouth daily 30 tablet 3     Belumosudil Mesylate 200 MG TABS Take 200 mg by mouth daily 30 tablet 3     Carboxymethylcell-Glycerin PF (REFRESH RELIEVA PF) 0.5-1 % SOLN Apply 1 drop to eye 4 times daily Preservative free. Either PF multidose bottle or PF vials 30 each 11     carboxymethylcellulose PF (CARBOXYMETHYLCELLULOSE SODIUM) 0.5 % ophthalmic solution Place 1 drop into both eyes 4 times daily 70 each 11     dexamethasone alcohol-free (DECADRON) 0.1 MG/ML solution Swish and spit 20 mLs (2 mg) in mouth 4 times daily 1000 mL 3     erythromycin (ROMYCIN) 5 MG/GM ophthalmic ointment Place 0.5 inches into both eyes At Bedtime 3.5 g 4     fluorometholone (FML LIQUIFILM) 0.1 % ophthalmic suspension Place 1 Drop into both eyes twice daily for 21 days then stop 5 mL 2     levothyroxine (SYNTHROID/LEVOTHROID) 150 MCG tablet Take 1 tablet (150 mcg) by mouth daily 30 tablet 11     LORazepam (ATIVAN) 1 MG tablet Take 1 tablet (1 mg) by mouth nightly as needed for anxiety or sleep 30 tablet 3     magnesium oxide (MAG-OX) 400 MG tablet Take 1 tablet (400 mg) by mouth 2 times daily 120 tablet 1     nicotine polacrilex (NICORETTE) 4 MG gum Take 4 mg by mouth as needed for smoking cessation        omeprazole (PRILOSEC) 40 MG DR capsule Take 1 capsule (40 mg) by mouth daily 30 capsule 3     ondansetron (ZOFRAN ODT) 8 MG ODT tab Take 1 tablet (8 mg) by mouth every 8 hours as needed for nausea 30 tablet 0     posaconazole (NOXAFIL) 100 MG EC tablet Take 3 tablets (300 mg) by mouth every morning 90 tablet 3     predniSONE (DELTASONE) 1 MG tablet Take 4 tablets (4 mg) by mouth daily Take on even days 120 tablet 1     predniSONE (DELTASONE) 5 MG tablet Take 2 tablets (10 mg) by  mouth every other day Take on odd days 60 tablet 3     rosuvastatin (CRESTOR) 5 MG tablet Take 1 tablet (5 mg) by mouth daily 30 tablet 3     sennosides (SENOKOT) 8.6 MG tablet Take 1 tablet by mouth 3 times daily as needed for constipation 90 tablet 0     sodium chloride 0.9 % neb solution 3 mLs by Other route 2 times daily 300 mL 11     UNABLE TO FIND Take 1,200 mg by mouth daily MEDICATION NAME: Flaxseed Oil                Physical Exam:   There were no vitals taken for this visit.   Gen: NAD, pleasant and cooperative   HEENT: Atraumatic, normocephalic, extraocular movements appear intact  Pulm: non-labored breathing in room air  Neuro/MSK:   Orientation: Oriented to person, time, place and situation, Exhibits good insight into his condition and ongoing treatment  Motor: Observed moving upper extremities actively against gravity      Labs/Imaging:  Lab Results   Component Value Date    WBC 11.7 (H) 03/01/2023    HGB 11.5 (L) 03/01/2023    HCT 33.5 (L) 03/01/2023     03/01/2023     03/01/2023     Lab Results   Component Value Date     03/01/2023    POTASSIUM 4.5 03/01/2023    CHLORIDE 100 03/01/2023    CO2 25 03/01/2023     (H) 03/01/2023     Lab Results   Component Value Date    GFRESTIMATED 69 03/01/2023    GFRESTBLACK >90 07/08/2021     Lab Results   Component Value Date    AST 31 03/01/2023    ALT 20 03/01/2023    ALKPHOS 97 03/01/2023    BILITOTAL 0.9 03/01/2023     Lab Results   Component Value Date    INR 0.90 12/01/2022     Lab Results   Component Value Date    BUN 27.5 (H) 03/01/2023    CR 1.18 (H) 03/01/2023              Assessment/Plan     Nik Nava is a 62 year old male w/ cancer history of Ph+ ALL now s/p all sib PBSCT complicated by GVHD, along w/ pertinent history provoked PE in setting of PEG asparaginase, secondary iron overload, RCC s/p nephrectomy in 2007, graves disease, anxiety, insomnia, left foot drop which has resolved who presents today for follow up on his  rehab needs.  As discussed with Nik, he should keep his scheduled follow-up at [facility] orthoped[facility] for further evaluation as his low back pain has not improved since he was last seen there.  He should also continue with physical therapy.  Initial imaging/x-rays did not demonstrate any acute findings, but with pain that is not improving he should be assessed for any other potential injuries that are soft tissue, muscular or disc related.  He denies any symptoms down his lower extremities that would be suggestive of radiculopathy.  He should continue with his home exercise regimen as instructed by therapy.  Overall aside from his low back pain, he is doing well functionally since his last visit, and so we will plan a 6-month return virtual visit.  Patient is in agreement with this plan.      1. Therapy/equipment/braces:  1. Continue outpatient physical therapy for low back pain.  2. Continue home exercise regimen as tolerated.  2. Medications:  1. Continue current pain medication regimen.  3. Referral / follow up with other providers:  1. Follow-up with [facility] orthopedics as planned for further evaluation and management of back injury.  4. Follow up: 6-month virtual visit.      Carmen Mcdonough MD  Physical Medicine & Rehabilitation    50 minutes spent on the date of the encounter doing chart review, history and exam, documentation and further activities as noted above.

## 2023-03-27 NOTE — PROGRESS NOTES
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    HPI:    Nik returns to clinic for follow up, reports biggest current concerns is ongoing lower back pain since his fall that is resulting in pain, limited activity, and overall decreased sense of wellbeing and fatigue; partially related also to his COVID re-infection in January as he has improvement in his overall well being and energy Till his covid infection. Reports overall better energy and resolution of nausea that he experienced after lowering steroids, now on higher dose steroids 10/4 with low am cortisol as checked by endocrine. Appetite is still sub optimal, denies V and diarrhea. He is on lower dexamethasone from 5 times a day to daily.  Mouth feels overall well, no ulcers, no sores, no skin rashes and eyes significantly better with wearing lenses, no pain. NO resp symptoms. No bleeding. NO HA.    Review of Systems                                                                                                                                         10 point Review of systems was negative except as detailed above     PHYSICAL EXAM                                                                                                                                                   KPS: 70    /72   Pulse 76   Temp 98  F (36.7  C)   Resp 16   Wt 108.9 kg (240 lb)   SpO2 98%   BMI 31.66 kg/m      Wt Readings from Last 4 Encounters:   03/01/23 111 kg (244 lb 11.2 oz)   02/23/23 108.4 kg (239 lb)   02/15/23 111.1 kg (245 lb)   01/12/23 113.9 kg (251 lb)      General: NAD.  HEENT: sclera anicteric, injected conjunctivae. No lichenoid changes in oral mucosa, no ulcers seen.    Lungs:  CTA b/l  no wheezes  CV: RRR  no gallop  Abd; soft, NABS, protuberant  Ext/ Skin: Skin bruising on bilateral forearms, fainting of previous hyperpigmented areas of previously  known cGVHD  LE 1+ bilateral edema in lower extremities; improved  Access: port-a-cath    cGVHD therapy started on February 24 2022  - Baseline NIH scoring 2/24/2022 : skin 2 maculopapular rash/erythema with no sclerotic features, mouth score 1 mild symptoms with lichenoid changes less than 25%, eyes score 2 moderate symptoms without new visual impairments due to KCS requiring lubricant eyedrops more than 3 times a day, overall mild scale for her provider  - 3/25/2022 1 month NIH treatment assessment on PQRST: skin 2, mouth score 1 mild symptoms with NO lichenoid changes, eyes score 2 moderate symptoms w requiring lubricant eyedrops more than 3 times a day, liver score 1 ALT 3.7x ULN and nl bilirubin   - 3/31  Mouth clear; eyes minimal LFT still abn; no joint or new GI sx  - 4/28 Mouth clear, Left>R eye is mild sclera erythema, lids are pink and irritated appearing, LFT's slow improvement, no new joint, skin, or GI symptoms.   -5/11 mouth with mild erythema but no lichenoid changes, eyes using eyedrops 4-6 times a day score of 2, LFTs significantly improved from baseline slight elevation in alk phos and AST with normal bilirubin, skin with hyperpigmentation from old acute GVHD no active skin rashes, no GI symptoms no musculoskeletal complaints.  -5/26 Mouth with very slight lichenoid changes, erythema.  Eyes still using eyedrops 4-6x per day.  LFTs essentially normal with slight elevation in alk phos.  Skin with hyperpigmentation from old acute GVHD, no active rashes, no GI or MSK concerns.  Skin 0, mouth 0 but with lichenoid changes, eyes 2  -6/7/22 Mouth with no lichenoid changes, erythema.  Eyes still using eyedrops 4-6x per day.  LFTs essentially normal with slight elevation in alk phos.  Skin with hyperpigmentation from old acute GVHD, no active rashes, no GI or MSK concerns.  Skin 0, mouth 0, eyes 2     -6 month PQRST assessment 9/6/2022: eyes 3, mouth 0 for symptoms, 25% lichenoid changes (1), liver/GI/skin  0    11/8/2022, 11/22/2022, 12/13/22 cGVHD NIG scoring eyes 3, no other organ involvement clinically  3/28/23  cGVHD NIG scoring eyes 3, no other organ involvement clinically    Lab:    Lab Results   Component Value Date    WBC 11.7 (H) 03/01/2023    ANEU 3.5 10/26/2021    HGB 11.5 (L) 03/01/2023    HCT 33.5 (L) 03/01/2023     03/01/2023     03/01/2023    POTASSIUM 4.5 03/01/2023    CHLORIDE 100 03/01/2023    CO2 25 03/01/2023     (H) 03/01/2023    BUN 27.5 (H) 03/01/2023    CR 1.18 (H) 03/01/2023    MAG 2.3 03/01/2023    INR 0.90 12/01/2022    BILITOTAL 0.9 03/01/2023    AST 31 03/01/2023    ALT 20 03/01/2023    ALKPHOS 97 03/01/2023    PROTTOTAL 6.7 03/01/2023    ALBUMIN 4.1 03/01/2023       ASSESSMENT AND PLAN   Nik Nava is a 62 year old male with Ph Pos ALL, day 594 s/p sib allo stem cell transplant    Day -6 (8/4): flu/cy  Day -5 through day -2 (8/5-8/8): flu  Day -1 (8/9): TBI  Day 0 (8/10): transplant     1.  Acute lymphoblastic leukemia, [place] chromosome positive in CR, MRD neg. S/p allo sib PBSCT. (ABO matched)  HCT-CI score: 3 (prior solid tumor)  Day +100 bone marrow biopsy is 100% donor with no morphologic or flow cytometric evidence of leukemia BCR abl is pending.  - Day +180 restaging BM bx- still in CR  100% donor;  2/16/22 BCR ABL major breakpoint undetectable.   - 1 year restaging 8/18/2022: Markedly hypocellular bone marrow [less than 10% cellular] with maturing trilineage hematopoiesis and no definite morphologic or immunophenotypic evidence for involvement by B lymphoblastic leukemia. BCRABL1 PCR not detected, bone marrow % donor. FISH NORMAL No evidence of BCR-ABL1 fusion  - Maintenance with TKI posttransplantation given his Ph positive ALL that have not been started previously presumed secondary to multiple active issues specifically infections and COVID.  Per Avila Tobar: will reconsider this patient will think about whether this is something he would like  to consider he was previously on Dasatinib, we would start on a low dose and increase as tolerated.  This is on hold now pending active GVHD therapy, we discussed on this visit 10/18 in agreement with holding off.     2.  HEME: CBC stable    3.  FEN/Renal: Creatinine 1.1, nephrology following     4.    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   #Skin GVHD- history of biopsy proven GVHD of the skin 8/30/2021; resolved.   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 WNL today   # Ocular GVHD: follows with ophthalmology. Maxitrol to lids, continue refreshing drops QID. Score 3  Followed closely by Dr. Juarez with significant improvement with scleral lenses and eyedrops  # Oral GVHD: dex s/s once daily now.  Lichenoid changes have resolved with no other ulcers since 11/8/2022     Previous therapies  Tacrolimus-previously decided to stay on same dose, no checks of levels if clinically stable, and no need switch to sirolimus. (keep tac low as gvhd is quiet and viremia). 5/10 level 45 with starting of COVID therapy and D-D intractions (Paxlovid for COVID19, which has a drug interaction with tacrolimus-Ritonavir increases serum levels of tacrolimus).   Tacrolimus level subtherapeutic, increase to 2.5 mg twice daily recently, 8/23/2022 instructed to change tacrolimus to 2 mg twice daily. 9/6 with mild NEGRA, hyperkalemia, hypomagnesemia, and reports some tremors and cramps, suspect tacrolimus to be high therefore empirically decreased to 1.5 twice daily, skip morning dose of tacrolimus and took 2 mg today after his afternoon labs. Decrease to 1mg BID on Saturday.  Sirolimus  9/21 despite fluids and a dose adjustment of tacrolimus patient seem to have persistence NORBERTO related NEGRA, therefore after discussion with the patient decision was made to transition to sirolimus that was started on 9/21, patient initially seem to tolerate this well with normalization  of kidney function  10/11 presented with flares of ocular and oral GVHD, fluid retention and gain of 5-6 kg, lower extremity edema, abdominal distention, total protein to creatinine ratio elevated at 0.4, in addition to elevation in BUN and creatinine, HTN.  Picture most consistent with sirolimus related side effects.  Less likely that the patient will tolerate even a lower level of sirolimus.  Therefore the patient was instructed to stop sirolimus, last dose on 10/10. 10/14 level 3.5 appropriately trending down.     Corticosteroids  3/1-3/16: prednisone 100mg every day  3/17 75mg every day   3/31 75 alt with 50  4/6: 75 alt with 25mg every other day  4/12 pred tapered to 60 alternating with 25mg   4/15 In setting of viruses trying to reactivate,GVHD stable: Taper 60/15mg alternating.  4/20 decrease pred further to 50/10.    4/25 taper pred to 50/0 every other day as long as symptoms stable.   5/2 Pred decrease 40/0 alternating every other day.   5/13 decrease to 30/0  5/20 decrease to 20/0 then slowly by 5 mg weekly  6/7 decrease to 10/0  Went up to 15/0 with mild increased LFTs  8/23/2022 increased to 20/0, with persistence of oral ulcers and active ocular GVHD.  Discussed with the patient the importance of stopping chewing of nicotine gums for prolonged hours as that would not help with the healing of the oral ulcers.  I discussed with the patient alternatives of nicotine patches that he will reconsider and let me know.  9/6 decreased to 15 alternating with 0  9/13 decreased to 10 alternating with 0  9/21 discontinued tacrolimus given persistent NEGRA and single kidney and start the patient on sirolimus without loading dose.  We will get a list of possible side effects and adverse events from the Pharm.D. see sirolimus section above.  10/11 GVHD flare. Sirolimus stopped. Resume prednisone 40 mg daily as of 10/12, no change with mildly worsening eye pain 10/14  Reduce pred to 35mg 10/25 and 25mg 11/1 11/8 20 mg    11/22 15 mg  12/13/22 10 mg (plan to taper to 5mg then slow taper 1 mg per month given long pred history)  2/23/23 lower from 5 mg to 4 mg for the next month  3/28/2023 continue on 10/4 given adrenal insufficieny with recent am cortisol check and clinical symptoms on taper to lower dose of 4 mg daily    Belumosudil  Patient intolerant/with disease flares on tacrolimus and sirolimus see above.  Discussed with the patient the different options for therapy of chronic GVHD that are FDA approved.  Given the side effect profiles after discussing in detail and given the least nephrotoxicity and expected response, taking into account other metabolic effect as well.  We will proceed with prior authorization with belumosudil.  Patient was given material to review for possible expected adverse events and side effects with both Belumosodil and ruxolitinib.   --- Started on 10/18 - skin/liver/oral GVHD inactive, and occular symptoms controlled. Will continue.     5.  ID:   #History of COVID x2 last 1/2023 and influenza    Treated with Paxlovid with persistent fatigue but no active respiratory symptoms  received his influenza annual vaccine as well as COVID boosters locally 11/16/2022     #History of pulmonary infiltrates:   4/20 CT chest with new RUL infiltrate. Highly immunocompromised. 4/22 Fungitell/asp GM were neg. BAL 4/29 NGTD, increased micafungin to 150mg IV daily-- f/u 6/1 Dr Garcia CT with mixed results, followed with Dr. Garcia August 2022 with resolution of pulmonary nodules and normalization of LFTs we will switch patient will switch patient to posaconazole prophylactic dose and monitor LFTs and any infectious concerns closely (wnl stable 10/14)     #History of CMV viremia:    - CMV viremia up to 1100. 4/15 started valcyte 900mg PO BID. 4/20 CMV <137, 5/10 ND, decreased to 450mg BID 4/30 - continue 4 weeks as long as CMV <137 till 5/28 + weekly CMV  - Switch to acyclovir after completion of current therapy 5/28.  10/11 CMV ND. Check monthly on IST, 11/8 CMV <137, recheck 3/1 ND     - PPx:   ACV  Posaconazole (previously on micafungin with LFts abl, hx of pulmonary nodules needign treatment dose).   Pentamidine, last 11/22  Azithro 250mg daily (stopped levaquin due to possible tendonitis).      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 S/p 4/22 and 5/4/22 rituximab 375mg/m2 5/10 ND x2, 6/7 ND, 8/18/2022 971, 10/11 ND, check every other month on IST, 11/8 ND, pending 3/28  S/p covid vaccine series 12/2021  S/p heathereld   Deferred annual vaccines in the setting of GVHD and immunosuppression therapy     6. Endo:   - Hx of graves disease; On synthroid 175 mcg daily. TSH normal 4/6/2022 no reflex to T4.  Recheck on 10/18 T3 low at 0.03, free T3 and T4 within normal.  Subclinical hyperthyroidism, 11/22/222 and discussion with endocrine, reduce the levothyroxine to 150 mcg/day and repeat labs in 2-3.  He has follow-up appointment with endocrine    - HLD: 11/2022 cholesterol 355, triglycerides 153, -- 11/8 started rosuvastatin 5 mg daily we will recheck lipid profile in 3 months, 11/22 CPK within normal     7. CV:   - Hypertension history - now on low side after weight loss  - TTE most recently 10/2022     8. GI:   -Omeprazole for heartburn  -LFTs as above, now normal. 9/6 decreased ursodiol to daily   -zofran 8mg IV x1 today; new Rx for zofran sent       9. Psych:   - Situational anxiety - lexapro 10mg daily.   - Insomnia: worse on steroids. Ativan, trazadone, melatonin     10. MSK: left food drop, PT. Occasional muscle cramps discussed optimizing vitamin D levels and considering vitamin D, some of the symptomatology of muscle cramps are likely related to chronic GVHD.     11. HCM/Age appropriate cancer screening:  -Discussed with the patient importance of age-appropriate cancer screening including colonoscopies, he is due for this.  -Discussed importance of at least once a year vitamin D level check, 8/18/2022  wnl  -Screening for secondary iron overload, see heme section above  -Yearly TSH 2022 wnl; yearly lipid panel - see GI section above  -9/6/2022 DEXA scan Based on BMD diagnosis is consistent with normal bone density based on WHO criteria Ref. 1   -PFTs 9/20/22, see above  -Metabolic other, 8/2022 testosterone within normal  -11/22/2022 discussed with the patient the challenges and the stresses of chronic illness and healthcare fatigue.  Patient had previously been on Lexapro that we restarted confirmed with pharmacy that there are no drug drug interactions.  Additionally we will referred patient to PMNR to help with physical rehab and strength to help with fatigue.  Our social workers will also reach out to Nik and his wife for further resources including support groups for patients with chronic illness and fatigue post transplant.     - RTC 4 weeks, continue same GVHD treatment, will check iron profile, B12/B9/zinc and copper levels, EBV PCR, inform me if I can be of help reg MSK issues, will follow with ortho and PT locally and PMR    I spent 40 minutes in the care of this patient today, which included time necessary for preparation for the visit, obtaining history, ordering medications/tests/procedures as medically indicated, review of pertinent medical literature, counseling of the patient, communication of recommendations to the care team, and documentation time.

## 2023-03-28 ENCOUNTER — ONCOLOGY VISIT (OUTPATIENT)
Dept: TRANSPLANT | Facility: CLINIC | Age: 63
End: 2023-03-28
Attending: INTERNAL MEDICINE
Payer: COMMERCIAL

## 2023-03-28 ENCOUNTER — APPOINTMENT (OUTPATIENT)
Dept: LAB | Facility: CLINIC | Age: 63
End: 2023-03-28
Attending: INTERNAL MEDICINE
Payer: COMMERCIAL

## 2023-03-28 ENCOUNTER — VIRTUAL VISIT (OUTPATIENT)
Dept: ONCOLOGY | Facility: CLINIC | Age: 63
End: 2023-03-28
Attending: INTERNAL MEDICINE
Payer: COMMERCIAL

## 2023-03-28 VITALS
WEIGHT: 240 LBS | DIASTOLIC BLOOD PRESSURE: 72 MMHG | SYSTOLIC BLOOD PRESSURE: 137 MMHG | HEART RATE: 76 BPM | TEMPERATURE: 98 F | RESPIRATION RATE: 16 BRPM | BODY MASS INDEX: 31.66 KG/M2 | OXYGEN SATURATION: 98 %

## 2023-03-28 DIAGNOSIS — D89.813 GVHD AS COMPLICATION OF BONE MARROW TRANSPLANT (H): Primary | ICD-10-CM

## 2023-03-28 DIAGNOSIS — T86.09 GVHD AS COMPLICATION OF BONE MARROW TRANSPLANT (H): Primary | ICD-10-CM

## 2023-03-28 DIAGNOSIS — D89.813 GVHD AS COMPLICATION OF BONE MARROW TRANSPLANT (H): ICD-10-CM

## 2023-03-28 DIAGNOSIS — T86.09 GVHD AS COMPLICATION OF BONE MARROW TRANSPLANT (H): ICD-10-CM

## 2023-03-28 DIAGNOSIS — C91.01 ACUTE LYMPHOBLASTIC LEUKEMIA (ALL) IN REMISSION (H): Primary | ICD-10-CM

## 2023-03-28 DIAGNOSIS — Z94.81 STATUS POST BONE MARROW TRANSPLANT (H): ICD-10-CM

## 2023-03-28 DIAGNOSIS — Z94.81 S/P BONE MARROW TRANSPLANT (H): ICD-10-CM

## 2023-03-28 DIAGNOSIS — C91.01 ACUTE LYMPHOBLASTIC LEUKEMIA (ALL) IN REMISSION (H): ICD-10-CM

## 2023-03-28 DIAGNOSIS — R53.81 PHYSICAL DECONDITIONING: ICD-10-CM

## 2023-03-28 DIAGNOSIS — R53.82 CHRONIC FATIGUE: ICD-10-CM

## 2023-03-28 LAB
ALBUMIN SERPL BCG-MCNC: 4.1 G/DL (ref 3.5–5.2)
ALP SERPL-CCNC: 98 U/L (ref 40–129)
ALT SERPL W P-5'-P-CCNC: 16 U/L (ref 10–50)
ANION GAP SERPL CALCULATED.3IONS-SCNC: 11 MMOL/L (ref 7–15)
AST SERPL W P-5'-P-CCNC: 26 U/L (ref 10–50)
BASOPHILS # BLD AUTO: 0 10E3/UL (ref 0–0.2)
BASOPHILS NFR BLD AUTO: 0 %
BILIRUB SERPL-MCNC: 0.7 MG/DL
BUN SERPL-MCNC: 25.1 MG/DL (ref 8–23)
CALCIUM SERPL-MCNC: 10 MG/DL (ref 8.8–10.2)
CHLORIDE SERPL-SCNC: 100 MMOL/L (ref 98–107)
CREAT SERPL-MCNC: 1.1 MG/DL (ref 0.67–1.17)
DEPRECATED HCO3 PLAS-SCNC: 25 MMOL/L (ref 22–29)
EOSINOPHIL # BLD AUTO: 0 10E3/UL (ref 0–0.7)
EOSINOPHIL NFR BLD AUTO: 0 %
ERYTHROCYTE [DISTWIDTH] IN BLOOD BY AUTOMATED COUNT: 15.2 % (ref 10–15)
FERRITIN SERPL-MCNC: 1381 NG/ML (ref 31–409)
GFR SERPL CREATININE-BSD FRML MDRD: 75 ML/MIN/1.73M2
GLUCOSE SERPL-MCNC: 131 MG/DL (ref 70–99)
HCT VFR BLD AUTO: 31.2 % (ref 40–53)
HGB BLD-MCNC: 10.7 G/DL (ref 13.3–17.7)
IMM GRANULOCYTES # BLD: 0 10E3/UL
IMM GRANULOCYTES NFR BLD: 0 %
IRON BINDING CAPACITY (ROCHE): 279 UG/DL (ref 240–430)
IRON SATN MFR SERPL: 10 % (ref 15–46)
IRON SERPL-MCNC: 28 UG/DL (ref 61–157)
LYMPHOCYTES # BLD AUTO: 3.2 10E3/UL (ref 0.8–5.3)
LYMPHOCYTES NFR BLD AUTO: 32 %
MCH RBC QN AUTO: 34.1 PG (ref 26.5–33)
MCHC RBC AUTO-ENTMCNC: 34.3 G/DL (ref 31.5–36.5)
MCV RBC AUTO: 99 FL (ref 78–100)
MONOCYTES # BLD AUTO: 0.7 10E3/UL (ref 0–1.3)
MONOCYTES NFR BLD AUTO: 7 %
NEUTROPHILS # BLD AUTO: 6 10E3/UL (ref 1.6–8.3)
NEUTROPHILS NFR BLD AUTO: 61 %
NRBC # BLD AUTO: 0 10E3/UL
NRBC BLD AUTO-RTO: 0 /100
PLATELET # BLD AUTO: 188 10E3/UL (ref 150–450)
POTASSIUM SERPL-SCNC: 4.5 MMOL/L (ref 3.4–5.3)
PROT SERPL-MCNC: 6.8 G/DL (ref 6.4–8.3)
RBC # BLD AUTO: 3.14 10E6/UL (ref 4.4–5.9)
SODIUM SERPL-SCNC: 136 MMOL/L (ref 136–145)
VIT B12 SERPL-MCNC: 470 PG/ML (ref 232–1245)
WBC # BLD AUTO: 10 10E3/UL (ref 4–11)

## 2023-03-28 PROCEDURE — 84466 ASSAY OF TRANSFERRIN: CPT | Performed by: INTERNAL MEDICINE

## 2023-03-28 PROCEDURE — 82607 VITAMIN B-12: CPT | Performed by: INTERNAL MEDICINE

## 2023-03-28 PROCEDURE — 87799 DETECT AGENT NOS DNA QUANT: CPT | Performed by: INTERNAL MEDICINE

## 2023-03-28 PROCEDURE — 85049 AUTOMATED PLATELET COUNT: CPT | Performed by: INTERNAL MEDICINE

## 2023-03-28 PROCEDURE — 80053 COMPREHEN METABOLIC PANEL: CPT | Performed by: INTERNAL MEDICINE

## 2023-03-28 PROCEDURE — 99212 OFFICE O/P EST SF 10 MIN: CPT | Performed by: INTERNAL MEDICINE

## 2023-03-28 PROCEDURE — 250N000011 HC RX IP 250 OP 636: Performed by: INTERNAL MEDICINE

## 2023-03-28 PROCEDURE — 36591 DRAW BLOOD OFF VENOUS DEVICE: CPT | Performed by: INTERNAL MEDICINE

## 2023-03-28 PROCEDURE — 83550 IRON BINDING TEST: CPT | Performed by: INTERNAL MEDICINE

## 2023-03-28 PROCEDURE — 82728 ASSAY OF FERRITIN: CPT | Performed by: INTERNAL MEDICINE

## 2023-03-28 PROCEDURE — 99215 OFFICE O/P EST HI 40 MIN: CPT | Performed by: INTERNAL MEDICINE

## 2023-03-28 RX ORDER — LEVOTHYROXINE SODIUM 150 UG/1
1 TABLET ORAL DAILY
COMMUNITY
Start: 2023-03-01 | End: 2023-08-07

## 2023-03-28 RX ORDER — ONDANSETRON 8 MG/1
TABLET, ORALLY DISINTEGRATING ORAL
COMMUNITY
Start: 2023-02-15 | End: 2023-11-14

## 2023-03-28 RX ORDER — ESCITALOPRAM OXALATE 10 MG/1
TABLET ORAL
COMMUNITY
Start: 2023-03-05 | End: 2023-07-07

## 2023-03-28 RX ORDER — AZITHROMYCIN 250 MG/1
1 TABLET, FILM COATED ORAL DAILY
COMMUNITY
Start: 2023-02-08 | End: 2023-08-07

## 2023-03-28 RX ORDER — LORAZEPAM 1 MG/1
1 TABLET ORAL
COMMUNITY
Start: 2023-01-09 | End: 2023-08-07

## 2023-03-28 RX ORDER — OMEPRAZOLE 40 MG/1
1 CAPSULE, DELAYED RELEASE ORAL DAILY
COMMUNITY
Start: 2023-02-09 | End: 2023-08-07

## 2023-03-28 RX ORDER — OXYCODONE AND ACETAMINOPHEN 5; 325 MG/1; MG/1
1 TABLET ORAL DAILY
COMMUNITY
Start: 2023-03-20 | End: 2023-11-14

## 2023-03-28 RX ORDER — ACYCLOVIR 800 MG/1
800 TABLET ORAL
COMMUNITY
Start: 2023-02-23 | End: 2023-08-07

## 2023-03-28 RX ORDER — FLUOROMETHOLONE 0.1 %
1 SUSPENSION, DROPS(FINAL DOSAGE FORM)(ML) OPHTHALMIC (EYE)
COMMUNITY
Start: 2023-03-16 | End: 2023-07-27

## 2023-03-28 RX ORDER — HEPARIN SODIUM (PORCINE) LOCK FLUSH IV SOLN 100 UNIT/ML 100 UNIT/ML
5 SOLUTION INTRAVENOUS ONCE
Status: COMPLETED | OUTPATIENT
Start: 2023-03-28 | End: 2023-03-28

## 2023-03-28 RX ADMIN — Medication 5 ML: at 15:49

## 2023-03-28 ASSESSMENT — PAIN SCALES - GENERAL: PAINLEVEL: MODERATE PAIN (4)

## 2023-03-28 NOTE — NURSING NOTE
Is the patient currently in the state of [place]? YES    Visit mode:VIDEO    If the visit is dropped, the patient can be reconnected by: VIDEO VISIT: Send to e-mail at: [email or web address]    Will anyone else be joining the visit? NO      How would you like to obtain your AVS? MyChart    Are changes needed to the allergy or medication list? NO    Reason for visit: return video visit    Trudy Mir, MELLISSA

## 2023-03-28 NOTE — NURSING NOTE
"Oncology Rooming Note    March 28, 2023 4:07 PM   Nik Nava is a 62 year old male who presents for:    Chief Complaint   Patient presents with     Port Draw     Labs drawn by RN via port, vitals taken.     Oncology Clinic Visit     Acute lymphoblastic leukemia      Initial Vitals: /72   Pulse 76   Temp 98  F (36.7  C)   Resp 16   Wt 108.9 kg (240 lb)   SpO2 98%   BMI 31.66 kg/m   Estimated body mass index is 31.66 kg/m  as calculated from the following:    Height as of 2/23/23: 1.854 m (6' 1\").    Weight as of this encounter: 108.9 kg (240 lb). Body surface area is 2.37 meters squared.  Moderate Pain (4) Comment: Data Unavailable   No LMP for male patient.  Allergies reviewed: Yes  Medications reviewed: Yes    Medications: Medication refills not needed today.  Pharmacy name entered into [employer]:    [facility] - [place], [place] - [address] [facility] [place], [place] - [address] [place] [address] [phone]  [facility]    Clinical concerns: none.       Deshawn Chaudhary"

## 2023-03-28 NOTE — LETTER
3/28/2023         RE: Nik Nava  [id number] [facility] [id number]        Dear Colleague,    Thank you for referring your patient, Nik Nava, to the [facility] [place]. Please see a copy of my visit note below.        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    HPI:    Nik returns to clinic for follow up, reports biggest current concerns is ongoing lower back pain since his fall that is resulting in pain, limited activity, and overall decreased sense of wellbeing and fatigue; partially related also to his COVID re-infection in January as he has improvement in his overall well being and energy Till his covid infection. Reports overall better energy and resolution of nausea that he experienced after lowering steroids, now on higher dose steroids 10/4 with low am cortisol as checked by endocrine. Appetite is still sub optimal, denies V and diarrhea. He is on lower dexamethasone from 5 times a day to daily.  Mouth feels overall well, no ulcers, no sores, no skin rashes and eyes significantly better with wearing lenses, no pain. NO resp symptoms. No bleeding. NO HA.    Review of Systems                                                                                                                                         10 point Review of systems was negative except as detailed above     PHYSICAL EXAM                                                                                                                                                   KPS: 70    /72   Pulse 76   Temp 98  F (36.7  C)   Resp 16   Wt 108.9 kg (240 lb)   SpO2 98%   BMI 31.66 kg/m      Wt Readings from Last 4 Encounters:   03/01/23 111 kg (244 lb 11.2 oz)   02/23/23 108.4 kg (239 lb)   02/15/23 111.1 kg (245 lb)   01/12/23 113.9 kg (251 lb)      General:  NAD.  HEENT: sclera anicteric, injected conjunctivae. No lichenoid changes in oral mucosa, no ulcers seen.    Lungs:  CTA b/l  no wheezes  CV: RRR  no gallop  Abd; soft, NABS, protuberant  Ext/ Skin: Skin bruising on bilateral forearms, fainting of previous hyperpigmented areas of previously known cGVHD  LE 1+ bilateral edema in lower extremities; improved  Access: port-a-cath    cGVHD therapy started on February 24 2022  - Baseline NIH scoring 2/24/2022 : skin 2 maculopapular rash/erythema with no sclerotic features, mouth score 1 mild symptoms with lichenoid changes less than 25%, eyes score 2 moderate symptoms without new visual impairments due to KCS requiring lubricant eyedrops more than 3 times a day, overall mild scale for her provider  - 3/25/2022 1 month NIH treatment assessment on PQRST: skin 2, mouth score 1 mild symptoms with NO lichenoid changes, eyes score 2 moderate symptoms w requiring lubricant eyedrops more than 3 times a day, liver score 1 ALT 3.7x ULN and nl bilirubin   - 3/31  Mouth clear; eyes minimal LFT still abn; no joint or new GI sx  - 4/28 Mouth clear, Left>R eye is mild sclera erythema, lids are pink and irritated appearing, LFT's slow improvement, no new joint, skin, or GI symptoms.   -5/11 mouth with mild erythema but no lichenoid changes, eyes using eyedrops 4-6 times a day score of 2, LFTs significantly improved from baseline slight elevation in alk phos and AST with normal bilirubin, skin with hyperpigmentation from old acute GVHD no active skin rashes, no GI symptoms no musculoskeletal complaints.  -5/26 Mouth with very slight lichenoid changes, erythema.  Eyes still using eyedrops 4-6x per day.  LFTs essentially normal with slight elevation in alk phos.  Skin with hyperpigmentation from old acute GVHD, no active rashes, no GI or MSK concerns.  Skin 0, mouth 0 but with lichenoid changes, eyes 2  -6/7/22 Mouth with no lichenoid changes, erythema.  Eyes still using eyedrops 4-6x per  day.  LFTs essentially normal with slight elevation in alk phos.  Skin with hyperpigmentation from old acute GVHD, no active rashes, no GI or MSK concerns.  Skin 0, mouth 0, eyes 2     -6 month PQRST assessment 9/6/2022: eyes 3, mouth 0 for symptoms, 25% lichenoid changes (1), liver/GI/skin 0    11/8/2022, 11/22/2022, 12/13/22 cGVHD NIG scoring eyes 3, no other organ involvement clinically  3/28/23  cGVHD NIG scoring eyes 3, no other organ involvement clinically    Lab:    Lab Results   Component Value Date    WBC 11.7 (H) 03/01/2023    ANEU 3.5 10/26/2021    HGB 11.5 (L) 03/01/2023    HCT 33.5 (L) 03/01/2023     03/01/2023     03/01/2023    POTASSIUM 4.5 03/01/2023    CHLORIDE 100 03/01/2023    CO2 25 03/01/2023     (H) 03/01/2023    BUN 27.5 (H) 03/01/2023    CR 1.18 (H) 03/01/2023    MAG 2.3 03/01/2023    INR 0.90 12/01/2022    BILITOTAL 0.9 03/01/2023    AST 31 03/01/2023    ALT 20 03/01/2023    ALKPHOS 97 03/01/2023    PROTTOTAL 6.7 03/01/2023    ALBUMIN 4.1 03/01/2023       ASSESSMENT AND PLAN   Nik Nava is a 62 year old male with Ph Pos ALL, day 594 s/p sib allo stem cell transplant    Day -6 (8/4): flu/cy  Day -5 through day -2 (8/5-8/8): flu  Day -1 (8/9): TBI  Day 0 (8/10): transplant     1.  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 S/p allo sib PBSCT. (ABO matched)  HCT-CI score: 3 (prior solid tumor)  Day +100 bone marrow biopsy is 100% donor with no morphologic or flow cytometric evidence of leukemia BCR abl is pending.  - Day +180 restaging BM bx- still in CR  100% donor;  2/16/22 BCR ABL major breakpoint undetectable.   - 1 year restaging 8/18/2022: Markedly hypocellular bone marrow [less than 10% cellular] with maturing trilineage hematopoiesis and no definite morphologic or immunophenotypic evidence for involvement by B lymphoblastic leukemia. BCRABL1 PCR not detected, bone marrow % donor. FISH NORMAL No evidence of BCR-ABL1  fusion  - Maintenance with TKI posttransplantation given his Ph positive ALL that have not been started previously presumed secondary to multiple active issues specifically infections and COVID.  Per Avila Tobar: will reconsider this patient will think about whether this is something he would like to consider he was previously on Dasatinib, we would start on a low dose and increase as tolerated.  This is on hold now pending active GVHD therapy, we discussed on this visit 10/18 in agreement with holding off.     2.  HEME: CBC stable    3.  FEN/Renal: Creatinine 1.1, nephrology following     4.    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   #Skin GVHD- history of biopsy proven GVHD of the skin 8/30/2021; resolved.   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 WNL today   # Ocular GVHD: follows with ophthalmology. Maxitrol to lids, continue refreshing drops QID. Score 3  Followed closely by Dr. Juarez with significant improvement with scleral lenses and eyedrops  # Oral GVHD: dex s/s once daily now.  Lichenoid changes have resolved with no other ulcers since 11/8/2022     Previous therapies  Tacrolimus-previously decided to stay on same dose, no checks of levels if clinically stable, and no need switch to sirolimus. (keep tac low as gvhd is quiet and viremia). 5/10 level 45 with starting of COVID therapy and D-D intractions (Paxlovid for COVID19, which has a drug interaction with tacrolimus-Ritonavir increases serum levels of tacrolimus).   Tacrolimus level subtherapeutic, increase to 2.5 mg twice daily recently, 8/23/2022 instructed to change tacrolimus to 2 mg twice daily. 9/6 with mild NEGRA, hyperkalemia, hypomagnesemia, and reports some tremors and cramps, suspect tacrolimus to be high therefore empirically decreased to 1.5 twice daily, skip morning dose of tacrolimus and took 2 mg today after his afternoon labs. Decrease to 1mg BID on  Saturday.  Sirolimus  9/21 despite fluids and a dose adjustment of tacrolimus patient seem to have persistence NORBERTO related NEGRA, therefore after discussion with the patient decision was made to transition to sirolimus that was started on 9/21, patient initially seem to tolerate this well with normalization of kidney function  10/11 presented with flares of ocular and oral GVHD, fluid retention and gain of 5-6 kg, lower extremity edema, abdominal distention, total protein to creatinine ratio elevated at 0.4, in addition to elevation in BUN and creatinine, HTN.  Picture most consistent with sirolimus related side effects.  Less likely that the patient will tolerate even a lower level of sirolimus.  Therefore the patient was instructed to stop sirolimus, last dose on 10/10. 10/14 level 3.5 appropriately trending down.     Corticosteroids  3/1-3/16: prednisone 100mg every day  3/17 75mg every day   3/31 75 alt with 50  4/6: 75 alt with 25mg every other day  4/12 pred tapered to 60 alternating with 25mg   4/15 In setting of viruses trying to reactivate,GVHD stable: Taper 60/15mg alternating.  4/20 decrease pred further to 50/10.    4/25 taper pred to 50/0 every other day as long as symptoms stable.   5/2 Pred decrease 40/0 alternating every other day.   5/13 decrease to 30/0  5/20 decrease to 20/0 then slowly by 5 mg weekly  6/7 decrease to 10/0  Went up to 15/0 with mild increased LFTs  8/23/2022 increased to 20/0, with persistence of oral ulcers and active ocular GVHD.  Discussed with the patient the importance of stopping chewing of nicotine gums for prolonged hours as that would not help with the healing of the oral ulcers.  I discussed with the patient alternatives of nicotine patches that he will reconsider and let me know.  9/6 decreased to 15 alternating with 0  9/13 decreased to 10 alternating with 0  9/21 discontinued tacrolimus given persistent NEGRA and single kidney and start the patient on sirolimus without  loading dose.  We will get a list of possible side effects and adverse events from the Pharm.D. see sirolimus section above.  10/11 GVHD flare. Sirolimus stopped. Resume prednisone 40 mg daily as of 10/12, no change with mildly worsening eye pain 10/14  Reduce pred to 35mg 10/25 and 25mg 11/1 11/8 20 mg   11/22 15 mg  12/13/22 10 mg (plan to taper to 5mg then slow taper 1 mg per month given long pred history)  2/23/23 lower from 5 mg to 4 mg for the next month  3/28/2023 continue on 10/4 given adrenal insufficieny with recent am cortisol check and clinical symptoms on taper to lower dose of 4 mg daily    Belumosudil  Patient intolerant/with disease flares on tacrolimus and sirolimus see above.  Discussed with the patient the different options for therapy of chronic GVHD that are FDA approved.  Given the side effect profiles after discussing in detail and given the least nephrotoxicity and expected response, taking into account other metabolic effect as well.  We will proceed with prior authorization with belumosudil.  Patient was given material to review for possible expected adverse events and side effects with both Belumosodil and ruxolitinib.   --- Started on 10/18 - skin/liver/oral GVHD inactive, and occular symptoms controlled. Will continue.     5.  ID:   #History of COVID x2 last 1/2023 and influenza    Treated with Paxlovid with persistent fatigue but no active respiratory symptoms  received his influenza annual vaccine as well as COVID boosters locally 11/16/2022     #History of pulmonary infiltrates:   4/20 CT chest with new RUL infiltrate. Highly immunocompromised. 4/22 Fungitell/asp GM were neg. BAL 4/29 NGTD, increased micafungin to 150mg IV daily-- f/u 6/1 Dr Garcia CT with mixed results, followed with Dr. Garcia August 2022 with resolution of pulmonary nodules and normalization of LFTs we will switch patient will switch patient to posaconazole prophylactic dose and monitor LFTs and any infectious  concerns closely (wnl stable 10/14)     #History of CMV viremia:    - CMV viremia up to 1100. 4/15 started valcyte 900mg PO BID. 4/20 CMV <137, 5/10 ND, decreased to 450mg BID 4/30 - continue 4 weeks as long as CMV <137 till 5/28 + weekly CMV  - Switch to acyclovir after completion of current therapy 5/28. 10/11 CMV ND. Check monthly on IST, 11/8 CMV <137, recheck 3/1 ND     - PPx:   ACV  Posaconazole (previously on micafungin with LFts abl, hx of pulmonary nodules needign treatment dose).   Pentamidine, last 11/22  Azithro 250mg daily (stopped levaquin due to possible tendonitis).      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 S/p 4/22 and 5/4/22 rituximab 375mg/m2 5/10 ND x2, 6/7 ND, 8/18/2022 971, 10/11 ND, check every other month on IST, 11/8 ND, pending 3/28  S/p covid vaccine series 12/2021  S/p evusheld   Deferred annual vaccines in the setting of GVHD and immunosuppression therapy     6. Endo:   - Hx of graves disease; On synthroid 175 mcg daily. TSH normal 4/6/2022 no reflex to T4.  Recheck on 10/18 T3 low at 0.03, free T3 and T4 within normal.  Subclinical hyperthyroidism, 11/22/222 and discussion with endocrine, reduce the levothyroxine to 150 mcg/day and repeat labs in 2-3.  He has follow-up appointment with endocrine    - HLD: 11/2022 cholesterol 355, triglycerides 153, -- 11/8 started rosuvastatin 5 mg daily we will recheck lipid profile in 3 months, 11/22 CPK within normal     7. CV:   - Hypertension history - now on low side after weight loss  - TTE most recently 10/2022     8. GI:   -Omeprazole for heartburn  -LFTs as above, now normal. 9/6 decreased ursodiol to daily   -zofran 8mg IV x1 today; new Rx for zofran sent       9. Psych:   - Situational anxiety - lexapro 10mg daily.   - Insomnia: worse on steroids. Ativan, trazadone, melatonin     10. MSK: left food drop, PT. Occasional muscle cramps discussed optimizing vitamin D levels and considering vitamin D, some of the  symptomatology of muscle cramps are likely related to chronic GVHD.     11. HCM/Age appropriate cancer screening:  -Discussed with the patient importance of age-appropriate cancer screening including colonoscopies, he is due for this.  -Discussed importance of at least once a year vitamin D level check, 8/18/2022 wnl  -Screening for secondary iron overload, see heme section above  -Yearly TSH 2022 wnl; yearly lipid panel - see GI section above  -9/6/2022 DEXA scan Based on BMD diagnosis is consistent with normal bone density based on WHO criteria Ref. 1   -PFTs 9/20/22, see above  -Metabolic other, 8/2022 testosterone within normal  -11/22/2022 discussed with the patient the challenges and the stresses of chronic illness and healthcare fatigue.  Patient had previously been on Lexapro that we restarted confirmed with pharmacy that there are no drug drug interactions.  Additionally we will referred patient to PMNR to help with physical rehab and strength to help with fatigue.  Our social workers will also reach out to Nik and his wife for further resources including support groups for patients with chronic illness and fatigue post transplant.     - RTC 4 weeks, continue same GVHD treatment, will check iron profile, B12/B9/zinc and copper levels, EBV PCR, inform me if I can be of help reg MSK issues, will follow with ortho and PT locally and PMR    I spent 40 minutes in the care of this patient today, which included time necessary for preparation for the visit, obtaining history, ordering medications/tests/procedures as medically indicated, review of pertinent medical literature, counseling of the patient, communication of recommendations to the care team, and documentation time.      Akshat Tobar MD

## 2023-03-28 NOTE — LETTER
"    3/28/2023         RE: Nik Nava  [address] [facility] [address]        Dear Colleague,    Thank you for referring your patient, Nik Nava, to the [facility]. Please see a copy of my visit note below.    [facility]   PM&R clinic note        Interval history:     Nik Nava presents to clinic today for follow up reg his rehab needs.   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  Was last seen in clinic on 12/20/22 via virtual phone visit  Recommendations included:  1. Therapy/equipment/braces:  1. We will try to get virtual physical therapy ordered for him.  If this does not work we can discuss with the patient whether they would prefer home PT or come into the clinic.  2. Medications:  1. Continue lorazepam as needed.  2. Trial melatonin 5 mg each evening. Advised 2 hours prior to bed time.   3. Follow up: 3-month virtual follow-up      Oncology History:  Per Dr. Chris Cote BMT note on 12/2022  \"1.  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 S/p allo sib PBSCT. (ABO matched)  HCT-CI score: 3 (prior solid tumor)  Day +100 bone marrow biopsy is 100% donor with no morphologic or flow cytometric evidence of leukemia BCR abl is pending.  - Day +180 restaging BM bx- still in CR  100% donor;  2/16/22 BCR ABL major breakpoint undetectable.   - 1 year restaging 8/18/2022: Markedly hypocellular bone marrow [less than 10% cellular] with maturing trilineage hematopoiesis and no definite morphologic or immunophenotypic evidence for involvement by B lymphoblastic leukemia. BCRABL1 PCR not detected, bone marrow % donor. FISH NORMAL No evidence of BCR-ABL1 fusion  - Maintenance with TKI posttransplantation given his Ph positive ALL that have not been started previously presumed secondary to multiple active issues specifically infections and COVID.  Per Avila Tobar: will " "reconsider this patient will think about whether this is something he would like to consider he was previously on Dasatinib, we would start on a low dose and increase as tolerated.  This is on hold now pending active GVHD therapy, we discussed on this visit 10/18 in agreement with holding off.\"    Medical issues since last visit, tested positive for COVID-19 in January.        Symptoms,  Patient was seen today for a return virtual visit.   He had one PT session so far and has a plan for once weekly sessions moving forward.   He had COVID in January, and it took 4 weeks to get over the infection.   About 1 month ago, he slipped on the ice and fell onto his bottom.  He states that he had some soreness in his bottom for some time, but then 2 weeks later he got up 1 morning after sleeping and had significant pain in his back.  He went to the chiropractor on 3/13/2023 and ended up doing 3 sessions with a chiropractor that week with no relief in his pain.  He then went to [facility] orthopedics urgent care, and saw a PA there who did x-rays which did not demonstrate any acute findings.  He was sent to physical therapy, and started physical therapy last week when he had his first session.  He has another session planned for today, and sessions ongoing weekly.  He feels that his back pain is still pretty significant and has not really improved.  He was prescribed oxycodone when he went to [facility] orthopedics, and he takes this as needed with relief of pain but states that it makes him feel very groggy so he does not like the feeling.  He has a follow-up appointment with the providers at [facility] orthopedics next week, and per his history may obtain more detailed imaging such as an MRI to evaluate for any other injuries.  Besides that fall, he has not had any other falls since his last visit.  He denies any other concerns at today's visit.  He feels that his appetite has been good and his energy levels overall are " good otherwise.      Therapies/HEP,  Started outpatient physical therapy for low back pain after a fall on the ice.  Being seen at [facility] orthopedics.      Functionally,   Remains independent with mobility, ADLs and IADLs.      Social history is unchanged.      Medications:  Current Outpatient Medications   Medication Sig Dispense Refill     acyclovir (ZOVIRAX) 800 MG tablet Take 1 tablet (800 mg) by mouth 2 times daily 60 tablet 1     azithromycin (ZITHROMAX) 250 MG tablet Take 1 tablet (250 mg) by mouth daily 30 tablet 3     Belumosudil Mesylate 200 MG TABS Take 200 mg by mouth daily 30 tablet 3     Carboxymethylcell-Glycerin PF (REFRESH RELIEVA PF) 0.5-1 % SOLN Apply 1 drop to eye 4 times daily Preservative free. Either PF multidose bottle or PF vials 30 each 11     carboxymethylcellulose PF (CARBOXYMETHYLCELLULOSE SODIUM) 0.5 % ophthalmic solution Place 1 drop into both eyes 4 times daily 70 each 11     dexamethasone alcohol-free (DECADRON) 0.1 MG/ML solution Swish and spit 20 mLs (2 mg) in mouth 4 times daily 1000 mL 3     erythromycin (ROMYCIN) 5 MG/GM ophthalmic ointment Place 0.5 inches into both eyes At Bedtime 3.5 g 4     fluorometholone (FML LIQUIFILM) 0.1 % ophthalmic suspension Place 1 Drop into both eyes twice daily for 21 days then stop 5 mL 2     levothyroxine (SYNTHROID/LEVOTHROID) 150 MCG tablet Take 1 tablet (150 mcg) by mouth daily 30 tablet 11     LORazepam (ATIVAN) 1 MG tablet Take 1 tablet (1 mg) by mouth nightly as needed for anxiety or sleep 30 tablet 3     magnesium oxide (MAG-OX) 400 MG tablet Take 1 tablet (400 mg) by mouth 2 times daily 120 tablet 1     nicotine polacrilex (NICORETTE) 4 MG gum Take 4 mg by mouth as needed for smoking cessation        omeprazole (PRILOSEC) 40 MG DR capsule Take 1 capsule (40 mg) by mouth daily 30 capsule 3     ondansetron (ZOFRAN ODT) 8 MG ODT tab Take 1 tablet (8 mg) by mouth every 8 hours as needed for nausea 30 tablet 0     posaconazole (NOXAFIL)  100 MG EC tablet Take 3 tablets (300 mg) by mouth every morning 90 tablet 3     predniSONE (DELTASONE) 1 MG tablet Take 4 tablets (4 mg) by mouth daily Take on even days 120 tablet 1     predniSONE (DELTASONE) 5 MG tablet Take 2 tablets (10 mg) by mouth every other day Take on odd days 60 tablet 3     rosuvastatin (CRESTOR) 5 MG tablet Take 1 tablet (5 mg) by mouth daily 30 tablet 3     sennosides (SENOKOT) 8.6 MG tablet Take 1 tablet by mouth 3 times daily as needed for constipation 90 tablet 0     sodium chloride 0.9 % neb solution 3 mLs by Other route 2 times daily 300 mL 11     UNABLE TO FIND Take 1,200 mg by mouth daily MEDICATION NAME: Flaxseed Oil                Physical Exam:   There were no vitals taken for this visit.   Gen: NAD, pleasant and cooperative   HEENT: Atraumatic, normocephalic, extraocular movements appear intact  Pulm: non-labored breathing in room air  Neuro/MSK:   Orientation: Oriented to person, time, place and situation, Exhibits good insight into his condition and ongoing treatment  Motor: Observed moving upper extremities actively against gravity      Labs/Imaging:  Lab Results   Component Value Date    WBC 11.7 (H) 03/01/2023    HGB 11.5 (L) 03/01/2023    HCT 33.5 (L) 03/01/2023     03/01/2023     03/01/2023     Lab Results   Component Value Date     03/01/2023    POTASSIUM 4.5 03/01/2023    CHLORIDE 100 03/01/2023    CO2 25 03/01/2023     (H) 03/01/2023     Lab Results   Component Value Date    GFRESTIMATED 69 03/01/2023    GFRESTBLACK >90 07/08/2021     Lab Results   Component Value Date    AST 31 03/01/2023    ALT 20 03/01/2023    ALKPHOS 97 03/01/2023    BILITOTAL 0.9 03/01/2023     Lab Results   Component Value Date    INR 0.90 12/01/2022     Lab Results   Component Value Date    BUN 27.5 (H) 03/01/2023    CR 1.18 (H) 03/01/2023              Assessment/Plan     Nik Nava is a 62 year old male w/ cancer history of Ph+ ALL now s/p all sib PBSCT  complicated by GVHD, along w/ pertinent history provoked PE in setting of PEG asparaginase, secondary iron overload, RCC s/p nephrectomy in 2007, graves disease, anxiety, insomnia, left foot drop which has resolved who presents today for follow up on his rehab needs.  As discussed with Nik, he should keep his scheduled follow-up at [facility] orthopedics for further evaluation as his low back pain has not improved since he was last seen there.  He should also continue with physical therapy.  Initial imaging/x-rays did not demonstrate any acute findings, but with pain that is not improving he should be assessed for any other potential injuries that are soft tissue, muscular or disc related.  He denies any symptoms down his lower extremities that would be suggestive of radiculopathy.  He should continue with his home exercise regimen as instructed by therapy.  Overall aside from his low back pain, he is doing well functionally since his last visit, and so we will plan a 6-month return virtual visit.  Patient is in agreement with this plan.      1. Therapy/equipment/braces:  1. Continue outpatient physical therapy for low back pain.  2. Continue home exercise regimen as tolerated.  2. Medications:  1. Continue current pain medication regimen.  3. Referral / follow up with other providers:  1. Follow-up with [facility] orthopedics as planned for further evaluation and management of back injury.  4. Follow up: 6-month virtual visit.      Carmen Mcdonough MD  Physical Medicine & Rehabilitation    50 minutes spent on the date of the encounter doing chart review, history and exam, documentation and further activities as noted above.              Virtual Visit Details    Type of service:  Video Visit     Originating Location (pt. Location): Home  Distant Location (provider location):  On-site  Platform used for Video Visit: AmWell              Again, thank you for allowing me to participate in the care of your patient.         Sincerely,        Carmen Mcdonough MD

## 2023-03-28 NOTE — LETTER
"    3/28/2023         RE: Nik Nava  [address] [facility] [address]        Dear Colleague,    Thank you for referring your patient, Nik Nava, to the [facility]. Please see a copy of my visit note below.    [facility]   PM&R clinic note        Interval history:     Nik Nava presents to clinic today for follow up reg his rehab needs.   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  Was last seen in clinic on 12/20/22 via virtual phone visit  Recommendations included:  1. Therapy/equipment/braces:  1. We will try to get virtual physical therapy ordered for him.  If this does not work we can discuss with the patient whether they would prefer home PT or come into the clinic.  2. Medications:  1. Continue lorazepam as needed.  2. Trial melatonin 5 mg each evening. Advised 2 hours prior to bed time.   3. Follow up: 3-month virtual follow-up      Oncology History:  Per Dr. Chris Cote BMT note on 12/2022  \"1.  Acute lymphoblastic leukemia, [place] chromosome positive in CR, MRD neg. S/p allo sib PBSCT. (ABO matched)  HCT-CI score: 3 (prior solid tumor)  Day +100 bone marrow biopsy is 100% donor with no morphologic or flow cytometric evidence of leukemia BCR abl is pending.  - Day +180 restaging BM bx- still in CR  100% donor;  2/16/22 BCR ABL major breakpoint undetectable.   - 1 year restaging 8/18/2022: Markedly hypocellular bone marrow [less than 10% cellular] with maturing trilineage hematopoiesis and no definite morphologic or immunophenotypic evidence for involvement by B lymphoblastic leukemia. BCRABL1 PCR not detected, bone marrow % donor. FISH NORMAL No evidence of BCR-ABL1 fusion  - Maintenance with TKI posttransplantation given his Ph positive ALL that have not been started previously presumed secondary to multiple active issues specifically infections and COVID.  Per Avila Tobar: will " "reconsider this patient will think about whether this is something he would like to consider he was previously on Dasatinib, we would start on a low dose and increase as tolerated.  This is on hold now pending active GVHD therapy, we discussed on this visit 10/18 in agreement with holding off.\"    Medical issues since last visit, tested positive for COVID-19 in January.        Symptoms,  Patient was seen today for a return virtual visit.   He had one PT session so far and has a plan for once weekly sessions moving forward.   He had COVID in January, and it took 4 weeks to get over the infection.   About 1 month ago, he slipped on the ice and fell onto his bottom.  He states that he had some soreness in his bottom for some time, but then 2 weeks later he got up 1 morning after sleeping and had significant pain in his back.  He went to the chiropractor on 3/13/2023 and ended up doing 3 sessions with a chiropractor that week with no relief in his pain.  He then went to [facility] orthopedics urgent care, and saw a PA there who did x-rays which did not demonstrate any acute findings.  He was sent to physical therapy, and started physical therapy last week when he had his first session.  He has another session planned for today, and sessions ongoing weekly.  He feels that his back pain is still pretty significant and has not really improved.  He was prescribed oxycodone when he went to [facility] orthopedics, and he takes this as needed with relief of pain but states that it makes him feel very groggy so he does not like the feeling.  He has a follow-up appointment with the providers at [facility] orthopedics next week, and per his history may obtain more detailed imaging such as an MRI to evaluate for any other injuries.  Besides that fall, he has not had any other falls since his last visit.  He denies any other concerns at today's visit.  He feels that his appetite has been good and his energy levels overall are " good otherwise.      Therapies/HEP,  Started outpatient physical therapy for low back pain after a fall on the ice.  Being seen at [facility] orthopedics.      Functionally,   Remains independent with mobility, ADLs and IADLs.      Social history is unchanged.      Medications:  Current Outpatient Medications   Medication Sig Dispense Refill     acyclovir (ZOVIRAX) 800 MG tablet Take 1 tablet (800 mg) by mouth 2 times daily 60 tablet 1     azithromycin (ZITHROMAX) 250 MG tablet Take 1 tablet (250 mg) by mouth daily 30 tablet 3     Belumosudil Mesylate 200 MG TABS Take 200 mg by mouth daily 30 tablet 3     Carboxymethylcell-Glycerin PF (REFRESH RELIEVA PF) 0.5-1 % SOLN Apply 1 drop to eye 4 times daily Preservative free. Either PF multidose bottle or PF vials 30 each 11     carboxymethylcellulose PF (CARBOXYMETHYLCELLULOSE SODIUM) 0.5 % ophthalmic solution Place 1 drop into both eyes 4 times daily 70 each 11     dexamethasone alcohol-free (DECADRON) 0.1 MG/ML solution Swish and spit 20 mLs (2 mg) in mouth 4 times daily 1000 mL 3     erythromycin (ROMYCIN) 5 MG/GM ophthalmic ointment Place 0.5 inches into both eyes At Bedtime 3.5 g 4     fluorometholone (FML LIQUIFILM) 0.1 % ophthalmic suspension Place 1 Drop into both eyes twice daily for 21 days then stop 5 mL 2     levothyroxine (SYNTHROID/LEVOTHROID) 150 MCG tablet Take 1 tablet (150 mcg) by mouth daily 30 tablet 11     LORazepam (ATIVAN) 1 MG tablet Take 1 tablet (1 mg) by mouth nightly as needed for anxiety or sleep 30 tablet 3     magnesium oxide (MAG-OX) 400 MG tablet Take 1 tablet (400 mg) by mouth 2 times daily 120 tablet 1     nicotine polacrilex (NICORETTE) 4 MG gum Take 4 mg by mouth as needed for smoking cessation        omeprazole (PRILOSEC) 40 MG DR capsule Take 1 capsule (40 mg) by mouth daily 30 capsule 3     ondansetron (ZOFRAN ODT) 8 MG ODT tab Take 1 tablet (8 mg) by mouth every 8 hours as needed for nausea 30 tablet 0     posaconazole (NOXAFIL)  100 MG EC tablet Take 3 tablets (300 mg) by mouth every morning 90 tablet 3     predniSONE (DELTASONE) 1 MG tablet Take 4 tablets (4 mg) by mouth daily Take on even days 120 tablet 1     predniSONE (DELTASONE) 5 MG tablet Take 2 tablets (10 mg) by mouth every other day Take on odd days 60 tablet 3     rosuvastatin (CRESTOR) 5 MG tablet Take 1 tablet (5 mg) by mouth daily 30 tablet 3     sennosides (SENOKOT) 8.6 MG tablet Take 1 tablet by mouth 3 times daily as needed for constipation 90 tablet 0     sodium chloride 0.9 % neb solution 3 mLs by Other route 2 times daily 300 mL 11     UNABLE TO FIND Take 1,200 mg by mouth daily MEDICATION NAME: Flaxseed Oil                Physical Exam:   There were no vitals taken for this visit.   Gen: NAD, pleasant and cooperative   HEENT: Atraumatic, normocephalic, extraocular movements appear intact  Pulm: non-labored breathing in room air  Neuro/MSK:   Orientation: Oriented to person, time, place and situation, Exhibits good insight into his condition and ongoing treatment  Motor: Observed moving upper extremities actively against gravity      Labs/Imaging:  Lab Results   Component Value Date    WBC 11.7 (H) 03/01/2023    HGB 11.5 (L) 03/01/2023    HCT 33.5 (L) 03/01/2023     03/01/2023     03/01/2023     Lab Results   Component Value Date     03/01/2023    POTASSIUM 4.5 03/01/2023    CHLORIDE 100 03/01/2023    CO2 25 03/01/2023     (H) 03/01/2023     Lab Results   Component Value Date    GFRESTIMATED 69 03/01/2023    GFRESTBLACK >90 07/08/2021     Lab Results   Component Value Date    AST 31 03/01/2023    ALT 20 03/01/2023    ALKPHOS 97 03/01/2023    BILITOTAL 0.9 03/01/2023     Lab Results   Component Value Date    INR 0.90 12/01/2022     Lab Results   Component Value Date    BUN 27.5 (H) 03/01/2023    CR 1.18 (H) 03/01/2023              Assessment/Plan     Nik Nava is a 62 year old male w/ cancer history of Ph+ ALL now s/p all sib PBSCT  complicated by GVHD, along w/ pertinent history provoked PE in setting of PEG asparaginase, secondary iron overload, RCC s/p nephrectomy in 2007, graves disease, anxiety, insomnia, left foot drop which has resolved who presents today for follow up on his rehab needs.  As discussed with Nik, he should keep his scheduled follow-up at [facility] orthopedics for further evaluation as his low back pain has not improved since he was last seen there.  He should also continue with physical therapy.  Initial imaging/x-rays did not demonstrate any acute findings, but with pain that is not improving he should be assessed for any other potential injuries that are soft tissue, muscular or disc related.  He denies any symptoms down his lower extremities that would be suggestive of radiculopathy.  He should continue with his home exercise regimen as instructed by therapy.  Overall aside from his low back pain, he is doing well functionally since his last visit, and so we will plan a 6-month return virtual visit.  Patient is in agreement with this plan.      1. Therapy/equipment/braces:  1. Continue outpatient physical therapy for low back pain.  2. Continue home exercise regimen as tolerated.  2. Medications:  1. Continue current pain medication regimen.  3. Referral / follow up with other providers:  1. Follow-up with [facility] orthopedics as planned for further evaluation and management of back injury.  4. Follow up: 6-month virtual visit.      Carmen Mcdonough MD  Physical Medicine & Rehabilitation    50 minutes spent on the date of the encounter doing chart review, history and exam, documentation and further activities as noted above.              Virtual Visit Details    Type of service:  Video Visit     Originating Location (pt. Location): Home  Distant Location (provider location):  On-site  Platform used for Video Visit: AmWell              Again, thank you for allowing me to participate in the care of your patient.         Sincerely,        Carmen Mcdonough MD

## 2023-03-28 NOTE — PROGRESS NOTES
Virtual Visit Details    Type of service:  Video Visit     Originating Location (pt. Location): Home  Distant Location (provider location):  On-site  Platform used for Video Visit: Tito

## 2023-03-28 NOTE — PATIENT INSTRUCTIONS
1.  Continue your home exercise regimen as tolerated.  2.  Continue physical therapy.  3.  Follow-up with [facility] orthopedics as planned next week for your low back pain.  4.  Follow-up with Dr. Mcdonough in 6 months for a return virtual visit or earlier as needed.

## 2023-03-29 DIAGNOSIS — T86.09 GVHD AS COMPLICATION OF BONE MARROW TRANSPLANT (H): ICD-10-CM

## 2023-03-29 DIAGNOSIS — T86.09 GVHD AS COMPLICATION OF BONE MARROW TRANSPLANT (H): Primary | ICD-10-CM

## 2023-03-29 DIAGNOSIS — D89.813 GVHD AS COMPLICATION OF BONE MARROW TRANSPLANT (H): ICD-10-CM

## 2023-03-29 DIAGNOSIS — C91.01 ACUTE LYMPHOBLASTIC LEUKEMIA (ALL) IN REMISSION (H): ICD-10-CM

## 2023-03-29 DIAGNOSIS — D89.813 GVHD AS COMPLICATION OF BONE MARROW TRANSPLANT (H): Primary | ICD-10-CM

## 2023-03-29 LAB
EBV DNA COPIES/ML, INSTRUMENT: 843 COPIES/ML
EBV DNA SPEC NAA+PROBE-LOG#: 2.9 {LOG_COPIES}/ML

## 2023-03-29 RX ORDER — DEXAMETHASONE 0.5 MG/5ML
2 SOLUTION ORAL 4 TIMES DAILY
Qty: 1000 ML | Refills: 3 | Status: SHIPPED | OUTPATIENT
Start: 2023-03-29 | End: 2023-06-26

## 2023-03-31 ENCOUNTER — TELEPHONE (OUTPATIENT)
Dept: TRANSPLANT | Facility: CLINIC | Age: 63
End: 2023-03-31
Payer: COMMERCIAL

## 2023-03-31 DIAGNOSIS — Z94.81 S/P BONE MARROW TRANSPLANT (H): Primary | ICD-10-CM

## 2023-03-31 LAB — TRANSFERRIN SERPL-MCNC: 225 MG/DL (ref 200–360)

## 2023-03-31 NOTE — TELEPHONE ENCOUNTER
Received call from internist at [facility]. Pt is hospitalized with diskitis, osteomyelitis, and epidural abscess. Provider called to coordinate care regarding immunosuppression. Dr Avila Tobar notified.

## 2023-04-10 DIAGNOSIS — D89.813 GVHD AS COMPLICATION OF BONE MARROW TRANSPLANT (H): ICD-10-CM

## 2023-04-10 DIAGNOSIS — T86.09 GVHD AS COMPLICATION OF BONE MARROW TRANSPLANT (H): ICD-10-CM

## 2023-04-10 RX ORDER — BELUMOSUDIL 200 MG/1
TABLET ORAL
Qty: 30 TABLET | Refills: 3 | Status: SHIPPED | OUTPATIENT
Start: 2023-04-10 | End: 2023-08-21

## 2023-04-11 DIAGNOSIS — C91.01 ACUTE LYMPHOBLASTIC LEUKEMIA (ALL) IN REMISSION (H): Primary | ICD-10-CM

## 2023-04-11 PROCEDURE — 94642 AEROSOL INHALATION TREATMENT: CPT | Performed by: INTERNAL MEDICINE

## 2023-04-11 PROCEDURE — 94640 AIRWAY INHALATION TREATMENT: CPT | Performed by: INTERNAL MEDICINE

## 2023-04-11 RX ORDER — PENTAMIDINE ISETHIONATE 300 MG/300MG
300 INHALANT RESPIRATORY (INHALATION)
Status: DISCONTINUED | OUTPATIENT
Start: 2023-04-11 | End: 2023-04-11 | Stop reason: HOSPADM

## 2023-04-11 RX ORDER — PENTAMIDINE ISETHIONATE 300 MG/300MG
300 INHALANT RESPIRATORY (INHALATION)
Status: CANCELLED
Start: 2023-04-22

## 2023-04-11 RX ORDER — ALBUTEROL SULFATE 0.83 MG/ML
2.5 SOLUTION RESPIRATORY (INHALATION)
Status: CANCELLED
Start: 2023-04-22

## 2023-04-11 RX ADMIN — PENTAMIDINE ISETHIONATE 300 MG: 300 INHALANT RESPIRATORY (INHALATION) at 11:59

## 2023-04-11 NOTE — PROGRESS NOTES
Nik Nava was seen today for a Pentamidine nebulizer tx ordered by Chapo Hansen.    Patient was first given 4 puffs Albuterol, after which Pentamidine 300 mg (Lot # [id number]) mixed with 6cc Sterile H20 was administered through a filtered nebulizer.    Pre-treatment: SpO2 = 97%   HR = 66   BBS = clear   Post-treatment: SpO2 = 98%  HR = 69  BBS = clear    No adverse side effects noted by the patient.    This service today was provided under the direct supervision of Dr. Oden, who was available if needed.     Procedure was completed by Deborah Lopez RRT.

## 2023-04-25 DIAGNOSIS — Z94.81 STATUS POST BONE MARROW TRANSPLANT (H): ICD-10-CM

## 2023-04-25 RX ORDER — POSACONAZOLE 100 MG/1
300 TABLET, DELAYED RELEASE ORAL EVERY MORNING
Qty: 90 TABLET | Refills: 3 | Status: SHIPPED | OUTPATIENT
Start: 2023-04-25 | End: 2023-08-31

## 2023-04-27 ENCOUNTER — TELEPHONE (OUTPATIENT)
Dept: TRANSPLANT | Facility: CLINIC | Age: 63
End: 2023-04-27
Payer: COMMERCIAL

## 2023-04-27 NOTE — TELEPHONE ENCOUNTER
BMT Nurse Triage - Generic/Other Symptoms     Treating Provider:   Dr Santo Tobar    Date of last office visit: 3/28    Onset of symptoms: 3/30    Duration of symptoms: 4 week    Brief description of symptoms:   Pt called BMT triage line to report worsening Back and hip pain. Pt had imaging and work up for back pain starting 3/30/23. Imaging concerning for discitis/osteomyelitis/ventral epidural abscess. Pt was started on antibiotics, no growth to date. Pt as been on Ceftriaxone IV Q 24hrs for 6 weeks. Pt has a MRI scheduled for tonight. Dr Melendrez is the provider from infectious disease that is managing this patient. Pt called with question for Dr Avila Tobar, could this be increase in pain be related to cGVHD? Writer said that it was unlikely, typical presentation of musculoskeletal GVHD symptoms are bilateral and pt is reporting unilateral pain. Pt was deeply concerned that that could be the cause and would like Dr Avila Tobar to weigh in on this increased concern for worsening back pain. Pt reports low back pain and right hip pain that is painful to touch and limited range of motion, and stiffness. Pt denies fever. Pt has been using Voltaren Gel. Is taking tramadol and tylenol scheduled, Hydroxyzine PRN and Oxycodone at night. Encouraged pt to reach out to palliative team to address pain control.  Pt denies lower extremity swelling, reports some muscle atrophy.     Addendum: Dr Avila Tobar replied that bony pain is not related to GVHD. Encouraged that pt has MRI scheduled. Pt can see DOM on Monday if he would prefer, otherwise he is scheduled to see her on Tues.

## 2023-05-01 RX ORDER — PENTAMIDINE ISETHIONATE 300 MG/300MG
300 INHALANT RESPIRATORY (INHALATION)
Status: CANCELLED | OUTPATIENT
Start: 2023-05-01

## 2023-05-01 RX ORDER — ALBUTEROL SULFATE 0.83 MG/ML
2.5 SOLUTION RESPIRATORY (INHALATION) EVERY 4 HOURS PRN
Status: CANCELLED | OUTPATIENT
Start: 2023-05-01

## 2023-05-01 NOTE — PROGRESS NOTES
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    HPI:    Nik returns to clinic for follow up, he has had a rough month with new diagnosis of osteomyelitis requiring IV antibiotics for at least 6 weeks.  He reports that since initiating therapy his back pain has improved however he had a new right hip pain with difficulty ambulating, he had MRI done at [facility] through his infectious disease physician for follow-up of treatment and also to include the right hip showed degeneration and likely tearing of the right acetabular labrum anterosuperiorly, and probably low-grade chondromalacia of the right hip joint.  Physical pain has caused a lot of mental distress in addition to difficulty sleeping.   In terms of chronic GVHD overall things have been stable.  Denies V and diarrhea. Mouth feels overall well, no ulcers, no sores, no skin rashes and eyes significantly better even though he has not been wearing lenses for a few days, no pain, eyedrops >=10 times per day. No active resp symptoms. No bleeding. NO HA.    Review of Systems                                                                                                                                         10 point Review of systems was negative except as detailed above     PHYSICAL EXAM                                                                                                                                                   KPS: 60-70      Wt Readings from Last 4 Encounters:   03/28/23 108.9 kg (240 lb)   03/01/23 111 kg (244 lb 11.2 oz)   02/23/23 108.4 kg (239 lb)   02/15/23 111.1 kg (245 lb)      General: NAD.  HEENT: sclera anicteric, injected conjunctivae. Mild patchy lichenoid changes in oral mucosa, no ulcers seen.    Lungs:  CTA b/l  no wheezes  CV: RRR  no gallop  Abd; soft, NABS, protuberant  Ext/ Skin: Skin bruising on bilateral forearms,  fainting of previous hyperpigmented areas of previously known cGVHD  LE 1+ bilateral edema in lower extremities; stable  Access: port-a-cath    cGVHD therapy started on February 24 2022  - Baseline NIH scoring 2/24/2022 : skin 2 maculopapular rash/erythema with no sclerotic features, mouth score 1 mild symptoms with lichenoid changes less than 25%, eyes score 2 moderate symptoms without new visual impairments due to KCS requiring lubricant eyedrops more than 3 times a day, overall mild scale for her provider  - 3/25/2022 1 month NIH treatment assessment on PQRST: skin 2, mouth score 1 mild symptoms with NO lichenoid changes, eyes score 2 moderate symptoms w requiring lubricant eyedrops more than 3 times a day, liver score 1 ALT 3.7x ULN and nl bilirubin   - 3/31  Mouth clear; eyes minimal LFT still abn; no joint or new GI sx  - 4/28 Mouth clear, Left>R eye is mild sclera erythema, lids are pink and irritated appearing, LFT's slow improvement, no new joint, skin, or GI symptoms.   -5/11 mouth with mild erythema but no lichenoid changes, eyes using eyedrops 4-6 times a day score of 2, LFTs significantly improved from baseline slight elevation in alk phos and AST with normal bilirubin, skin with hyperpigmentation from old acute GVHD no active skin rashes, no GI symptoms no musculoskeletal complaints.  -5/26 Mouth with very slight lichenoid changes, erythema.  Eyes still using eyedrops 4-6x per day.  LFTs essentially normal with slight elevation in alk phos.  Skin with hyperpigmentation from old acute GVHD, no active rashes, no GI or MSK concerns.  Skin 0, mouth 0 but with lichenoid changes, eyes 2  -6/7/22 Mouth with no lichenoid changes, erythema.  Eyes still using eyedrops 4-6x per day.  LFTs essentially normal with slight elevation in alk phos.  Skin with hyperpigmentation from old acute GVHD, no active rashes, no GI or MSK concerns.  Skin 0, mouth 0, eyes 2     -6 month PQRST assessment 9/6/2022: eyes 3, mouth 0  for symptoms, 25% lichenoid changes (1), liver/GI/skin 0    11/8/2022, 11/22/2022, 12/13/22 cGVHD NIG scoring eyes 3, no other organ involvement clinically  3/28/23 cGVHD NIG scoring eyes 3, no other organ involvement clinically  5/2/23 cGVHD NIG scoring eyes 3, oral 1, no other organ involvement clinically    Lab:    Lab Results   Component Value Date    WBC 10.0 03/28/2023    ANEU 3.5 10/26/2021    HGB 10.7 (L) 03/28/2023    HCT 31.2 (L) 03/28/2023     03/28/2023     03/28/2023    POTASSIUM 4.5 03/28/2023    CHLORIDE 100 03/28/2023    CO2 25 03/28/2023     (H) 03/28/2023    BUN 25.1 (H) 03/28/2023    CR 1.10 03/28/2023    MAG 2.3 03/01/2023    INR 0.90 12/01/2022    BILITOTAL 0.7 03/28/2023    AST 26 03/28/2023    ALT 16 03/28/2023    ALKPHOS 98 03/28/2023    PROTTOTAL 6.8 03/28/2023    ALBUMIN 4.1 03/28/2023 4/28/2023    MRI Lumbar spine  1.  The previously seen ventral epidural abscess has resolved with small amount of residual ventral epidural phlegmon.   2.  Marrow edema about the L5-S1 endplates has mildly worsened within new rim-enhancing subchondral lesion in the left S1 superior endplate. This could reflect progression of osteomyelitis.      MRI hip right  1.  No evidence of septic arthritis of the right hip joint.   2.  There is degeneration and likely tearing of the right acetabular labrum anterosuperiorly, and probably low-grade chondromalacia of the right hip joint.   3.  Abnormality at L5-S1 compatible with known discitis-osteomyelitis.    ASSESSMENT AND PLAN   Nik Nava is a 62 year old male with Ph Pos ALL, day 629 s/p sib allo stem cell transplant    Day -6 (8/4): flu/cy  Day -5 through day -2 (8/5-8/8): flu  Day -1 (8/9): TBI  Day 0 (8/10): transplant     1.  Acute lymphoblastic leukemia, [place] chromosome positive in CR, MRD neg. S/p allo sib PBSCT. (ABO matched)  HCT-CI score: 3 (prior solid tumor)  Day +100 bone marrow biopsy is 100% donor with no morphologic  or flow cytometric evidence of leukemia BCR abl is pending.  - Day +180 restaging BM bx- still in CR  100% donor;  2/16/22 BCR ABL major breakpoint undetectable.   - 1 year restaging 8/18/2022: Markedly hypocellular bone marrow [less than 10% cellular] with maturing trilineage hematopoiesis and no definite morphologic or immunophenotypic evidence for involvement by B lymphoblastic leukemia. BCRABL1 PCR not detected, bone marrow % donor. FISH NORMAL No evidence of BCR-ABL1 fusion  - Maintenance with TKI posttransplantation given his Ph positive ALL that have not been started previously presumed secondary to multiple active issues specifically infections and COVID.  Per Avila Tobar: will reconsider this patient will think about whether this is something he would like to consider he was previously on Dasatinib, we would start on a low dose and increase as tolerated.  This is on hold now pending active GVHD therapy, we discussed on this visit 10/18 in agreement with holding off.     2.  HEME: CBC stable.   3/2023 iron low 28, iron saturation index 10%, transferrin 225, ferritin 1381 down trending (in retrospect that was the time of epidural abscess as well).  B12, folate normal.  Pending copper and zinc.  Started iron replacement in 4/2023, recheck iron profile on follow up    3.  FEN/Renal: Creatinine 0.97, s/p nephrectormy, nephrology following     4.    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   5/2/23 cGVHD NIG scoring eyes 3, oral 1, no other organ involvement clinically  #Skin GVHD- history of biopsy proven GVHD of the skin 8/30/2021; resolved.   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 WNL today   # Ocular GVHD: follows with ophthalmology. Maxitrol to lids, continue refreshing drops QID. Score 3  Followed closely by Dr. Juarez with significant improvement with scleral lenses and eyedrops  # Oral GVHD: dex s/s.  Lichenoid changes  have largely resolved with no other ulcers since 11/8/2022     Previous therapies  Tacrolimus-previously decided to stay on same dose, no checks of levels if clinically stable, and no need switch to sirolimus. (keep tac low as gvhd is quiet and viremia). 5/10 level 45 with starting of COVID therapy and D-D intractions (Paxlovid for COVID19, which has a drug interaction with tacrolimus-Ritonavir increases serum levels of tacrolimus).   Tacrolimus level subtherapeutic, increase to 2.5 mg twice daily recently, 8/23/2022 instructed to change tacrolimus to 2 mg twice daily. 9/6 with mild NEGRA, hyperkalemia, hypomagnesemia, and reports some tremors and cramps, suspect tacrolimus to be high therefore empirically decreased to 1.5 twice daily, skip morning dose of tacrolimus and took 2 mg today after his afternoon labs. Decrease to 1mg BID on Saturday.  Sirolimus  9/21 despite fluids and a dose adjustment of tacrolimus patient seem to have persistence NORBERTO related NEGRA, therefore after discussion with the patient decision was made to transition to sirolimus that was started on 9/21, patient initially seem to tolerate this well with normalization of kidney function  10/11 presented with flares of ocular and oral GVHD, fluid retention and gain of 5-6 kg, lower extremity edema, abdominal distention, total protein to creatinine ratio elevated at 0.4, in addition to elevation in BUN and creatinine, HTN.  Picture most consistent with sirolimus related side effects.  Less likely that the patient will tolerate even a lower level of sirolimus.  Therefore the patient was instructed to stop sirolimus, last dose on 10/10. 10/14 level 3.5 appropriately trending down.     Corticosteroids  3/1-3/16: prednisone 100mg every day  3/17 75mg every day   3/31 75 alt with 50  4/6: 75 alt with 25mg every other day  4/12 pred tapered to 60 alternating with 25mg   4/15 In setting of viruses trying to reactivate,GVHD stable: Taper 60/15mg  alternating.  4/20 decrease pred further to 50/10.    4/25 taper pred to 50/0 every other day as long as symptoms stable.   5/2 Pred decrease 40/0 alternating every other day.   5/13 decrease to 30/0  5/20 decrease to 20/0 then slowly by 5 mg weekly  6/7 decrease to 10/0  Went up to 15/0 with mild increased LFTs  8/23/2022 increased to 20/0, with persistence of oral ulcers and active ocular GVHD.  Discussed with the patient the importance of stopping chewing of nicotine gums for prolonged hours as that would not help with the healing of the oral ulcers.  I discussed with the patient alternatives of nicotine patches that he will reconsider and let me know.  9/6 decreased to 15 alternating with 0  9/13 decreased to 10 alternating with 0  9/21 discontinued tacrolimus given persistent NEGRA and single kidney and start the patient on sirolimus without loading dose.  We will get a list of possible side effects and adverse events from the Pharm.D. see sirolimus section above.  10/11 GVHD flare. Sirolimus stopped. Resume prednisone 40 mg daily as of 10/12, no change with mildly worsening eye pain 10/14  Reduce pred to 35mg 10/25 and 25mg 11/1 11/8 20 mg   11/22 15 mg  12/13/22 10 mg (plan to taper to 5mg then slow taper 1 mg per month given long pred history)  2/23/23 lower from 5 mg to 4 mg for the next month  3/28/2023 - 5/2/2023 continue on 10/4 given adrenal insufficieny with recent am cortisol check and clinical symptoms on taper to lower dose of 4 mg daily    Belumosudil  Patient intolerant/with disease flares on tacrolimus and sirolimus see above.  Discussed with the patient the different options for therapy of chronic GVHD that are FDA approved.  Given the side effect profiles after discussing in detail and given the least nephrotoxicity and expected response, taking into account other metabolic effect as well.  We will proceed with prior authorization with belumosudil.  Patient was given material to review for  possible expected adverse events and side effects with both Belumosodil and ruxolitinib.   --- Started on 10/18 - skin/liver/oral GVHD inactive, and occular symptoms controlled/symptomatic improvement. Will continue.     5.  ID:   # Recent history of Epidural abscess: Followed by Dr. Candelario ID, plan to be on IV ceftriaxone for at least 6 weeks course through 5/11/2023-to be determined on further follow-up.  See imaging with MRI follow-up as above.  3/30/2023 blood culture with Staph epidermidis  3/31/2023 BONE, L5, FLUOROSCOPICALLY-GUIDED NEEDLE BIOPSY: Benign bone with trilineage hematopoiesis (normal) Negative for neoplasm Negative for acute osteomyelitis. DISC, L5-S1, ASPIRATION FOR CYTOLOGY:   Acellular debris; no cellular material present for evaluation     #History of COVID x2 last 1/2023 and influenza    Treated with Paxlovid with persistent fatigue but no active respiratory symptoms  received his influenza annual vaccine as well as COVID boosters locally 11/16/2022     #History of pulmonary infiltrates:   4/20 CT chest with new RUL infiltrate. Highly immunocompromised. 4/22 Fungitell/asp GM were neg. BAL 4/29 NGTD, increased micafungin to 150mg IV daily-- f/u 6/1 Dr Garcia CT with mixed results, followed with Dr. Garcia August 2022 with resolution of pulmonary nodules and normalization of LFTs we will switch patient will switch patient to posaconazole prophylactic dose and monitor LFTs and any infectious concerns closely     #History of CMV viremia:    - CMV viremia up to 1100. 4/15 started valcyte 900mg PO BID. 4/20 CMV <137, 5/10 ND, decreased to 450mg BID 4/30 - continue 4 weeks as long as CMV <137 till 5/28 + weekly CMV  - Switch to acyclovir after completion of current therapy 5/28. 10/11 CMV ND. Check monthly on IST, 11/8 CMV <137, recheck 3/1 ND     - PPx:   ACV  Posaconazole (previously on micafungin with LFts abl, hx of pulmonary nodules needign treatment dose).   Pentamidine, last 11/22  Hipolito  250mg daily (stopped levaquin due to possible tendonitis).   IgG1/2023 364, 4/2023 460      - EBV viremia: 4/20 CAP CT (w/ contrast): No adenopathy. S/p 4/22 and 5/4/22 rituximab 375mg/m2 5/10 ND x2, 6/7 ND, 8/18/2022 971, 10/11 ND, check every other month on IST, 11/8 ND, 3/28 843  S/p covid vaccine series 12/2021  S/p evusheld   Deferred annual vaccines in the setting of GVHD and immunosuppression therapy     6. Endo:   - Hx of graves disease; On synthroid follows with endocrine  - HLD: 11/2022 cholesterol 355, triglycerides 153, -- 11/8 started rosuvastatin 5 mg daily we will recheck lipid profile in 3 months, 11/22 CPK within normal, 5/2/2023 repeat lipid profile cholesterol down to 202, triglycerides 191, LDL now normal at 95.  We will continue same dose of rosuvastatin and  add fish oil 1g BID.     7. CV:   - Hypertension history   - TTE most recently 10/2022     8. GI:   -Omeprazole for heartburn  -LFTs as above, now normal. Off ursodiol     9. Psych:   - Situational anxiety - lexapro 10mg daily.   - Insomnia: worse on steroids. Ativan, trazadone, melatonin     10. MSK:   -History of left food drop, PT. Occasional muscle cramps discussed optimizing vitamin D levels and considering vitamin D, some of the symptomatology of muscle cramps are likely related to chronic GVHD.  No muscle cramps reported today.  -4/2023 new tearing of the right acetabular labrum anterosuperiorly referred to to orthopedic surgery walk-in clinic care or locally if faster access to care.       11. HCM/Age appropriate cancer screening:  -Discussed with the patient importance of age-appropriate cancer screening including colonoscopies, he is due for this.  -Discussed importance of at least once a year vitamin D level check, 8/18/2022 wnl  -Screening for secondary iron overload, see heme section above  -Yearly TSH 2022 wnl; yearly lipid panel - see GI section above  -9/6/2022 DEXA scan Based on BMD diagnosis is consistent with  normal bone density based on WHO criteria Ref. 1   -PFTs 9/20/22, see above  -Metabolic other, 8/2022 testosterone within normal  -11/22/2022 discussed with the patient the challenges and the stresses of chronic illness and healthcare fatigue.  Patient had previously been on Lexapro that we restarted confirmed with pharmacy that there are no drug drug interactions.  Additionally we will referred patient to PMNR to help with physical rehab and strength to help with fatigue.  Our social workers will also reach out to Nik and his wife for further resources including support groups for patients with chronic illness and fatigue post transplant.  -Referral to palliative care for symptom and pain management including insomnia     - RTC 6 weeks  start fish oil 1g BID  referral to palliative care/pain management  referral to Ortho  follow-up locally with infectious disease for management of osteomyelitis  continue same GVHD treatment (on physiologic dose of steroids and good GVHD control overall)  will re-check iron profile, pending zinc and copper levels  follow with PT locally and PMR  Mild zinc deficiency, will add supplementation for 6 weeks  Follow-up with ophthalmology for optimizing topical ocular GVHD therapy    I spent 50 minutes in the care of this patient today, which included time necessary for preparation for the visit, obtaining history, ordering medications/tests/procedures as medically indicated, review of pertinent medical literature, counseling of the patient, communication of recommendations to the care team, and documentation time.

## 2023-05-02 ENCOUNTER — APPOINTMENT (OUTPATIENT)
Dept: LAB | Facility: CLINIC | Age: 63
End: 2023-05-02
Attending: INTERNAL MEDICINE
Payer: COMMERCIAL

## 2023-05-02 ENCOUNTER — ONCOLOGY VISIT (OUTPATIENT)
Dept: TRANSPLANT | Facility: CLINIC | Age: 63
End: 2023-05-02
Attending: INTERNAL MEDICINE
Payer: COMMERCIAL

## 2023-05-02 VITALS
BODY MASS INDEX: 31.7 KG/M2 | DIASTOLIC BLOOD PRESSURE: 78 MMHG | RESPIRATION RATE: 18 BRPM | HEART RATE: 67 BPM | TEMPERATURE: 98.6 F | WEIGHT: 240.3 LBS | SYSTOLIC BLOOD PRESSURE: 136 MMHG | OXYGEN SATURATION: 94 %

## 2023-05-02 DIAGNOSIS — C91.01 ACUTE LYMPHOBLASTIC LEUKEMIA (ALL) IN REMISSION (H): ICD-10-CM

## 2023-05-02 DIAGNOSIS — D89.813 GVHD AS COMPLICATION OF BONE MARROW TRANSPLANT (H): ICD-10-CM

## 2023-05-02 DIAGNOSIS — E89.0 POSTABLATIVE HYPOTHYROIDISM: ICD-10-CM

## 2023-05-02 DIAGNOSIS — E78.2 MIXED HYPERLIPIDEMIA: ICD-10-CM

## 2023-05-02 DIAGNOSIS — T86.09 GVHD AS COMPLICATION OF BONE MARROW TRANSPLANT (H): ICD-10-CM

## 2023-05-02 DIAGNOSIS — D89.813 GVHD AS COMPLICATION OF BONE MARROW TRANSPLANT (H): Primary | ICD-10-CM

## 2023-05-02 DIAGNOSIS — T86.09 GVHD AS COMPLICATION OF BONE MARROW TRANSPLANT (H): Primary | ICD-10-CM

## 2023-05-02 LAB
CHOLEST SERPL-MCNC: 202 MG/DL
FOLATE SERPL-MCNC: 7.3 NG/ML (ref 4.6–34.8)
HDLC SERPL-MCNC: 69 MG/DL
LDLC SERPL CALC-MCNC: 95 MG/DL
MAGNESIUM SERPL-MCNC: 2 MG/DL (ref 1.7–2.3)
NONHDLC SERPL-MCNC: 133 MG/DL
T3 SERPL-MCNC: 106 NG/DL (ref 85–202)
T4 FREE SERPL-MCNC: 1.55 NG/DL (ref 0.9–1.7)
TRIGL SERPL-MCNC: 191 MG/DL
TSH SERPL DL<=0.005 MIU/L-ACNC: 0.15 UIU/ML (ref 0.3–4.2)

## 2023-05-02 PROCEDURE — 84439 ASSAY OF FREE THYROXINE: CPT | Performed by: INTERNAL MEDICINE

## 2023-05-02 PROCEDURE — 84630 ASSAY OF ZINC: CPT | Performed by: INTERNAL MEDICINE

## 2023-05-02 PROCEDURE — 36591 DRAW BLOOD OFF VENOUS DEVICE: CPT | Performed by: INTERNAL MEDICINE

## 2023-05-02 PROCEDURE — 84480 ASSAY TRIIODOTHYRONINE (T3): CPT | Performed by: INTERNAL MEDICINE

## 2023-05-02 PROCEDURE — 82525 ASSAY OF COPPER: CPT | Performed by: INTERNAL MEDICINE

## 2023-05-02 PROCEDURE — 99215 OFFICE O/P EST HI 40 MIN: CPT | Performed by: INTERNAL MEDICINE

## 2023-05-02 PROCEDURE — 250N000011 HC RX IP 250 OP 636: Performed by: INTERNAL MEDICINE

## 2023-05-02 PROCEDURE — 83735 ASSAY OF MAGNESIUM: CPT | Performed by: INTERNAL MEDICINE

## 2023-05-02 PROCEDURE — 82746 ASSAY OF FOLIC ACID SERUM: CPT | Performed by: INTERNAL MEDICINE

## 2023-05-02 PROCEDURE — 99211 OFF/OP EST MAY X REQ PHY/QHP: CPT | Performed by: INTERNAL MEDICINE

## 2023-05-02 PROCEDURE — 84443 ASSAY THYROID STIM HORMONE: CPT | Performed by: INTERNAL MEDICINE

## 2023-05-02 PROCEDURE — 80061 LIPID PANEL: CPT | Performed by: INTERNAL MEDICINE

## 2023-05-02 RX ORDER — ACETAMINOPHEN 500 MG
500 TABLET ORAL PRN
COMMUNITY
End: 2024-08-05

## 2023-05-02 RX ORDER — ACYCLOVIR 800 MG/1
800 TABLET ORAL 2 TIMES DAILY
Qty: 60 TABLET | Refills: 4 | Status: SHIPPED | OUTPATIENT
Start: 2023-05-02 | End: 2023-10-17

## 2023-05-02 RX ORDER — OXYCODONE HYDROCHLORIDE 5 MG/1
TABLET ORAL
COMMUNITY
Start: 2023-04-19 | End: 2023-08-07

## 2023-05-02 RX ORDER — ROSUVASTATIN CALCIUM 5 MG/1
5 TABLET, COATED ORAL DAILY
Qty: 30 TABLET | Refills: 3 | Status: SHIPPED | OUTPATIENT
Start: 2023-05-02 | End: 2023-08-31

## 2023-05-02 RX ORDER — HYDROCHLOROTHIAZIDE 12.5 MG/1
TABLET ORAL
COMMUNITY
End: 2023-08-01

## 2023-05-02 RX ORDER — HEPARIN SODIUM (PORCINE) LOCK FLUSH IV SOLN 100 UNIT/ML 100 UNIT/ML
5 SOLUTION INTRAVENOUS ONCE
Status: COMPLETED | OUTPATIENT
Start: 2023-05-02 | End: 2023-05-02

## 2023-05-02 RX ORDER — FERROUS SULFATE 325(65) MG
325 TABLET, DELAYED RELEASE (ENTERIC COATED) ORAL
COMMUNITY
End: 2023-10-10

## 2023-05-02 RX ADMIN — Medication 5 ML: at 11:42

## 2023-05-02 ASSESSMENT — PAIN SCALES - GENERAL: PAINLEVEL: MODERATE PAIN (4)

## 2023-05-02 NOTE — LETTER
5/2/2023         RE: Nik Nava  [address] [facility] [address]        Dear Colleague,    Thank you for referring your patient, Nik Nava, to the [facility] [place]. Please see a copy of my visit note below.        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    HPI:    Nik returns to clinic for follow up, he has had a rough month with new diagnosis of osteomyelitis requiring IV antibiotics for at least 6 weeks.  He reports that since initiating therapy his back pain has improved however he had a new right hip pain with difficulty ambulating, he had MRI done at [facility] through his infectious disease physician for follow-up of treatment and also to include the right hip showed degeneration and likely tearing of the right acetabular labrum anterosuperiorly, and probably low-grade chondromalacia of the right hip joint.  Physical pain has caused a lot of mental distress in addition to difficulty sleeping.   In terms of chronic GVHD overall things have been stable.  Denies V and diarrhea. Mouth feels overall well, no ulcers, no sores, no skin rashes and eyes significantly better even though he has not been wearing lenses for a few days, no pain, eyedrops >=10 times per day. No active resp symptoms. No bleeding. NO HA.    Review of Systems                                                                                                                                         10 point Review of systems was negative except as detailed above     PHYSICAL EXAM                                                                                                                                                   KPS: 60-70      Wt Readings from Last 4 Encounters:   03/28/23 108.9 kg (240 lb)   03/01/23 111 kg (244 lb 11.2 oz)   02/23/23 108.4 kg (239 lb)   02/15/23  111.1 kg (245 lb)      General: NAD.  HEENT: sclera anicteric, injected conjunctivae. Mild patchy lichenoid changes in oral mucosa, no ulcers seen.    Lungs:  CTA b/l  no wheezes  CV: RRR  no gallop  Abd; soft, NABS, protuberant  Ext/ Skin: Skin bruising on bilateral forearms, fainting of previous hyperpigmented areas of previously known cGVHD  LE 1+ bilateral edema in lower extremities; stable  Access: port-a-cath    cGVHD therapy started on February 24 2022  - Baseline NIH scoring 2/24/2022 : skin 2 maculopapular rash/erythema with no sclerotic features, mouth score 1 mild symptoms with lichenoid changes less than 25%, eyes score 2 moderate symptoms without new visual impairments due to KCS requiring lubricant eyedrops more than 3 times a day, overall mild scale for her provider  - 3/25/2022 1 month NIH treatment assessment on PQRST: skin 2, mouth score 1 mild symptoms with NO lichenoid changes, eyes score 2 moderate symptoms w requiring lubricant eyedrops more than 3 times a day, liver score 1 ALT 3.7x ULN and nl bilirubin   - 3/31  Mouth clear; eyes minimal LFT still abn; no joint or new GI sx  - 4/28 Mouth clear, Left>R eye is mild sclera erythema, lids are pink and irritated appearing, LFT's slow improvement, no new joint, skin, or GI symptoms.   -5/11 mouth with mild erythema but no lichenoid changes, eyes using eyedrops 4-6 times a day score of 2, LFTs significantly improved from baseline slight elevation in alk phos and AST with normal bilirubin, skin with hyperpigmentation from old acute GVHD no active skin rashes, no GI symptoms no musculoskeletal complaints.  -5/26 Mouth with very slight lichenoid changes, erythema.  Eyes still using eyedrops 4-6x per day.  LFTs essentially normal with slight elevation in alk phos.  Skin with hyperpigmentation from old acute GVHD, no active rashes, no GI or MSK concerns.  Skin 0, mouth 0 but with lichenoid changes, eyes 2  -6/7/22 Mouth with no lichenoid changes,  erythema.  Eyes still using eyedrops 4-6x per day.  LFTs essentially normal with slight elevation in alk phos.  Skin with hyperpigmentation from old acute GVHD, no active rashes, no GI or MSK concerns.  Skin 0, mouth 0, eyes 2     -6 month PQRST assessment 9/6/2022: eyes 3, mouth 0 for symptoms, 25% lichenoid changes (1), liver/GI/skin 0    11/8/2022, 11/22/2022, 12/13/22 cGVHD NIG scoring eyes 3, no other organ involvement clinically  3/28/23 cGVHD NIG scoring eyes 3, no other organ involvement clinically  5/2/23 cGVHD NIG scoring eyes 3, oral 1, no other organ involvement clinically    Lab:    Lab Results   Component Value Date    WBC 10.0 03/28/2023    ANEU 3.5 10/26/2021    HGB 10.7 (L) 03/28/2023    HCT 31.2 (L) 03/28/2023     03/28/2023     03/28/2023    POTASSIUM 4.5 03/28/2023    CHLORIDE 100 03/28/2023    CO2 25 03/28/2023     (H) 03/28/2023    BUN 25.1 (H) 03/28/2023    CR 1.10 03/28/2023    MAG 2.3 03/01/2023    INR 0.90 12/01/2022    BILITOTAL 0.7 03/28/2023    AST 26 03/28/2023    ALT 16 03/28/2023    ALKPHOS 98 03/28/2023    PROTTOTAL 6.8 03/28/2023    ALBUMIN 4.1 03/28/2023 4/28/2023    MRI Lumbar spine  1.  The previously seen ventral epidural abscess has resolved with small amount of residual ventral epidural phlegmon.   2.  Marrow edema about the L5-S1 endplates has mildly worsened within new rim-enhancing subchondral lesion in the left S1 superior endplate. This could reflect progression of osteomyelitis.      MRI hip right  1.  No evidence of septic arthritis of the right hip joint.   2.  There is degeneration and likely tearing of the right acetabular labrum anterosuperiorly, and probably low-grade chondromalacia of the right hip joint.   3.  Abnormality at L5-S1 compatible with known discitis-osteomyelitis.    ASSESSMENT AND PLAN   Nik Nava is a 62 year old male with Ph Pos ALL, day 629 s/p sib allo stem cell transplant    Day -6 (8/4): flu/cy  Day -5 through day  -2 (8/5-8/8): flu  Day -1 (8/9): TBI  Day 0 (8/10): transplant     1.  Acute lymphoblastic leukemia, [place] chromosome positive in CR, MRD neg. S/p allo sib PBSCT. (ABO matched)  HCT-CI score: 3 (prior solid tumor)  Day +100 bone marrow biopsy is 100% donor with no morphologic or flow cytometric evidence of leukemia BCR abl is pending.  - Day +180 restaging BM bx- still in CR  100% donor;  2/16/22 BCR ABL major breakpoint undetectable.   - 1 year restaging 8/18/2022: Markedly hypocellular bone marrow [less than 10% cellular] with maturing trilineage hematopoiesis and no definite morphologic or immunophenotypic evidence for involvement by B lymphoblastic leukemia. BCRABL1 PCR not detected, bone marrow % donor. FISH NORMAL No evidence of BCR-ABL1 fusion  - Maintenance with TKI posttransplantation given his Ph positive ALL that have not been started previously presumed secondary to multiple active issues specifically infections and COVID.  Per Avila Tobar: will reconsider this patient will think about whether this is something he would like to consider he was previously on Dasatinib, we would start on a low dose and increase as tolerated.  This is on hold now pending active GVHD therapy, we discussed on this visit 10/18 in agreement with holding off.     2.  HEME: CBC stable.   3/2023 iron low 28, iron saturation index 10%, transferrin 225, ferritin 1381 down trending (in retrospect that was the time of epidural abscess as well).  B12, folate normal.  Pending copper and zinc.  Started iron replacement in 4/2023, recheck iron profile on follow up    3.  FEN/Renal: Creatinine 0.97, s/p nephrectormy, nephrology following     4.    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   5/2/23 cGVHD NIG scoring eyes 3, oral 1, no other organ involvement clinically  #Skin GVHD- history of biopsy proven GVHD of the skin 8/30/2021; resolved.   # Liver cGVHD biopsy proven 2/21/2022: LFTs are overall  improving despite pred taper. WNL today   # Ocular GVHD: follows with ophthalmology. Maxitrol to lids, continue refreshing drops QID. Score 3  Followed closely by Dr. Juarez with significant improvement with scleral lenses and eyedrops  # Oral GVHD: dex s/s.  Lichenoid changes have largely resolved with no other ulcers since 11/8/2022     Previous therapies  Tacrolimus-previously decided to stay on same dose, no checks of levels if clinically stable, and no need switch to sirolimus. (keep tac low as gvhd is quiet and viremia). 5/10 level 45 with starting of COVID therapy and D-D intractions (Paxlovid for COVID19, which has a drug interaction with tacrolimus-Ritonavir increases serum levels of tacrolimus).   Tacrolimus level subtherapeutic, increase to 2.5 mg twice daily recently, 8/23/2022 instructed to change tacrolimus to 2 mg twice daily. 9/6 with mild NEGRA, hyperkalemia, hypomagnesemia, and reports some tremors and cramps, suspect tacrolimus to be high therefore empirically decreased to 1.5 twice daily, skip morning dose of tacrolimus and took 2 mg today after his afternoon labs. Decrease to 1mg BID on Saturday.  Sirolimus  9/21 despite fluids and a dose adjustment of tacrolimus patient seem to have persistence NORBERTO related NEGRA, therefore after discussion with the patient decision was made to transition to sirolimus that was started on 9/21, patient initially seem to tolerate this well with normalization of kidney function  10/11 presented with flares of ocular and oral GVHD, fluid retention and gain of 5-6 kg, lower extremity edema, abdominal distention, total protein to creatinine ratio elevated at 0.4, in addition to elevation in BUN and creatinine, HTN.  Picture most consistent with sirolimus related side effects.  Less likely that the patient will tolerate even a lower level of sirolimus.  Therefore the patient was instructed to stop sirolimus, last dose on 10/10. 10/14 level 3.5 appropriately trending  down.     Corticosteroids  3/1-3/16: prednisone 100mg every day  3/17 75mg every day   3/31 75 alt with 50  4/6: 75 alt with 25mg every other day  4/12 pred tapered to 60 alternating with 25mg   4/15 In setting of viruses trying to reactivate,GVHD stable: Taper 60/15mg alternating.  4/20 decrease pred further to 50/10.    4/25 taper pred to 50/0 every other day as long as symptoms stable.   5/2 Pred decrease 40/0 alternating every other day.   5/13 decrease to 30/0  5/20 decrease to 20/0 then slowly by 5 mg weekly  6/7 decrease to 10/0  Went up to 15/0 with mild increased LFTs  8/23/2022 increased to 20/0, with persistence of oral ulcers and active ocular GVHD.  Discussed with the patient the importance of stopping chewing of nicotine gums for prolonged hours as that would not help with the healing of the oral ulcers.  I discussed with the patient alternatives of nicotine patches that he will reconsider and let me know.  9/6 decreased to 15 alternating with 0  9/13 decreased to 10 alternating with 0  9/21 discontinued tacrolimus given persistent NEGRA and single kidney and start the patient on sirolimus without loading dose.  We will get a list of possible side effects and adverse events from the Pharm.D. see sirolimus section above.  10/11 GVHD flare. Sirolimus stopped. Resume prednisone 40 mg daily as of 10/12, no change with mildly worsening eye pain 10/14  Reduce pred to 35mg 10/25 and 25mg 11/1 11/8 20 mg   11/22 15 mg  12/13/22 10 mg (plan to taper to 5mg then slow taper 1 mg per month given long pred history)  2/23/23 lower from 5 mg to 4 mg for the next month  3/28/2023 - 5/2/2023 continue on 10/4 given adrenal insufficieny with recent am cortisol check and clinical symptoms on taper to lower dose of 4 mg daily    Belumosudil  Patient intolerant/with disease flares on tacrolimus and sirolimus see above.  Discussed with the patient the different options for therapy of chronic GVHD that are FDA approved.  Given  the side effect profiles after discussing in detail and given the least nephrotoxicity and expected response, taking into account other metabolic effect as well.  We will proceed with prior authorization with belumosudil.  Patient was given material to review for possible expected adverse events and side effects with both Belumosodil and ruxolitinib.   --- Started on 10/18 - skin/liver/oral GVHD inactive, and occular symptoms controlled/symptomatic improvement. Will continue.     5.  ID:   # Recent history of Epidural abscess: Followed by Dr. Candelario ID, plan to be on IV ceftriaxone for at least 6 weeks course through 5/11/2023-to be determined on further follow-up.  See imaging with MRI follow-up as above.  3/30/2023 blood culture with Staph epidermidis  3/31/2023 BONE, L5, FLUOROSCOPICALLY-GUIDED NEEDLE BIOPSY: Benign bone with trilineage hematopoiesis (normal) Negative for neoplasm Negative for acute osteomyelitis. DISC, L5-S1, ASPIRATION FOR CYTOLOGY:   Acellular debris; no cellular material present for evaluation     #History of COVID x2 last 1/2023 and influenza    Treated with Paxlovid with persistent fatigue but no active respiratory symptoms  received his influenza annual vaccine as well as COVID boosters locally 11/16/2022     #History of pulmonary infiltrates:   4/20 CT chest with new RUL infiltrate. Highly immunocompromised. 4/22 Fungitell/asp GM were neg. BAL 4/29 NGTD, increased micafungin to 150mg IV daily-- f/u 6/1 Dr Garcia CT with mixed results, followed with Dr. Garcia August 2022 with resolution of pulmonary nodules and normalization of LFTs we will switch patient will switch patient to posaconazole prophylactic dose and monitor LFTs and any infectious concerns closely     #History of CMV viremia:    - CMV viremia up to 1100. 4/15 started valcyte 900mg PO BID. 4/20 CMV <137, 5/10 ND, decreased to 450mg BID 4/30 - continue 4 weeks as long as CMV <137 till 5/28 + weekly CMV  - Switch to acyclovir  after completion of current therapy 5/28. 10/11 CMV ND. Check monthly on IST, 11/8 CMV <137, recheck 3/1 ND     - PPx:   ACV  Posaconazole (previously on micafungin with LFts abl, hx of pulmonary nodules needign treatment dose).   Pentamidine, last 11/22  Azithro 250mg daily (stopped levaquin due to possible tendonitis).   IgG1/2023 364, 4/2023 460      - EBV viremia: 4/20 CAP CT (w/ contrast): No adenopathy. S/p 4/22 and 5/4/22 rituximab 375mg/m2 5/10 ND x2, 6/7 ND, 8/18/2022 971, 10/11 ND, check every other month on IST, 11/8 ND, 3/28 843  S/p covid vaccine series 12/2021  S/p evusheld   Deferred annual vaccines in the setting of GVHD and immunosuppression therapy     6. Endo:   - Hx of graves disease; On synthroid follows with endocrine  - HLD: 11/2022 cholesterol 355, triglycerides 153, -- 11/8 started rosuvastatin 5 mg daily we will recheck lipid profile in 3 months, 11/22 CPK within normal, 5/2/2023 repeat lipid profile cholesterol down to 202, triglycerides 191, LDL now normal at 95.  We will continue same dose of rosuvastatin and  add fish oil 1g BID.     7. CV:   - Hypertension history   - TTE most recently 10/2022     8. GI:   -Omeprazole for heartburn  -LFTs as above, now normal. Off ursodiol     9. Psych:   - Situational anxiety - lexapro 10mg daily.   - Insomnia: worse on steroids. Ativan, trazadone, melatonin     10. MSK:   -History of left food drop, PT. Occasional muscle cramps discussed optimizing vitamin D levels and considering vitamin D, some of the symptomatology of muscle cramps are likely related to chronic GVHD.  No muscle cramps reported today.  -4/2023 new tearing of the right acetabular labrum anterosuperiorly referred to to orthopedic surgery walk-in clinic care or locally if faster access to care.       11. HCM/Age appropriate cancer screening:  -Discussed with the patient importance of age-appropriate cancer screening including colonoscopies, he is due for this.  -Discussed  importance of at least once a year vitamin D level check, 8/18/2022 wnl  -Screening for secondary iron overload, see heme section above  -Yearly TSH 2022 wnl; yearly lipid panel - see GI section above  -9/6/2022 DEXA scan Based on BMD diagnosis is consistent with normal bone density based on WHO criteria Ref. 1   -PFTs 9/20/22, see above  -Metabolic other, 8/2022 testosterone within normal  -11/22/2022 discussed with the patient the challenges and the stresses of chronic illness and healthcare fatigue.  Patient had previously been on Lexapro that we restarted confirmed with pharmacy that there are no drug drug interactions.  Additionally we will referred patient to PMNR to help with physical rehab and strength to help with fatigue.  Our social workers will also reach out to Nik and his wife for further resources including support groups for patients with chronic illness and fatigue post transplant.  -Referral to palliative care for symptom and pain management including insomnia     - RTC 6 weeks  start fish oil 1g BID  referral to palliative care/pain management  referral to Ortho  follow-up locally with infectious disease for management of osteomyelitis  continue same GVHD treatment (on physiologic dose of steroids and good GVHD control overall)  will re-check iron profile, pending zinc and copper levels  follow with PT locally and PMR  Follow-up with ophthalmology for optimizing topical ocular GVHD therapy    I spent 50 minutes in the care of this patient today, which included time necessary for preparation for the visit, obtaining history, ordering medications/tests/procedures as medically indicated, review of pertinent medical literature, counseling of the patient, communication of recommendations to the care team, and documentation time.        Akshat Tobar MD

## 2023-05-02 NOTE — NURSING NOTE
Chief Complaint   Patient presents with     Oncology Clinic Visit     RTN: Acute lymphoblastic leukemia     Port Draw     Labs drawn via port. VS taken.     Pt came with port accessed. Labs collected, line flushed with saline and heparin.  Vitals taken. Pt checked in for appointment(s). Pt remained accessed for at home infusions.    Tru Rogers RN

## 2023-05-02 NOTE — NURSING NOTE
"Oncology Rooming Note    May 2, 2023 11:50 AM   Nik Nava is a 62 year old male who presents for:    Chief Complaint   Patient presents with     Oncology Clinic Visit     RTN: Acute lymphoblastic leukemia     Port Draw     Labs drawn via port. VS taken.     Initial Vitals: /78   Pulse 67   Temp 98.6  F (37  C) (Oral)   Resp 18   Wt 109 kg (240 lb 4.8 oz)   SpO2 94%   BMI 31.70 kg/m   Estimated body mass index is 31.7 kg/m  as calculated from the following:    Height as of 2/23/23: 1.854 m (6' 1\").    Weight as of this encounter: 109 kg (240 lb 4.8 oz). Body surface area is 2.37 meters squared.  Moderate Pain (4) Comment: Data Unavailable   No LMP for male patient.  Allergies reviewed: Yes  Medications reviewed: Yes    Medications: Medication refills not needed today.  Pharmacy name entered into [employer]:    [facility] - [place], [place] - [address] [facility] [place], [place] - [address] [facility] [phone]  [facility]    Clinical concerns: none     Priscilla Spaulding            "

## 2023-05-03 ENCOUNTER — OFFICE VISIT (OUTPATIENT)
Dept: OPTOMETRY | Facility: CLINIC | Age: 63
End: 2023-05-03
Payer: MEDICARE

## 2023-05-03 DIAGNOSIS — H25.13 NUCLEAR AGE-RELATED CATARACT, BOTH EYES: ICD-10-CM

## 2023-05-03 DIAGNOSIS — H04.129 DRY EYE: Primary | ICD-10-CM

## 2023-05-03 DIAGNOSIS — D89.813 GVHD AS COMPLICATION OF BONE MARROW TRANSPLANT (H): ICD-10-CM

## 2023-05-03 DIAGNOSIS — T86.09 GVHD AS COMPLICATION OF BONE MARROW TRANSPLANT (H): ICD-10-CM

## 2023-05-03 LAB
COPPER SERPL-MCNC: 154.4 UG/DL
ZINC SERPL-MCNC: 59.8 UG/DL

## 2023-05-03 RX ORDER — METAPROTERENOL SULFATE 10 MG
1000 TABLET ORAL DAILY
Qty: 120 CAPSULE | Refills: 3 | Status: SHIPPED | OUTPATIENT
Start: 2023-05-03 | End: 2023-12-14

## 2023-05-03 ASSESSMENT — REFRACTION_CURRENTRX
OS_BASECURVE: 7.9
OS_SPHERE: -0.50
OS_ADDL_SPECS: OPT EXTRA BLUE, HYDRAPEG
OD_BASECURVE: 7.9
OD_DIAMETER: 14.9
OD_BRAND: ONEFIT
OD_ADDL_SPECS: OPT EXTRA CLEAR, HYDRAPEG
OS_DIAMETER: 14.9
OS_BRAND: ONEFIT
OD_SPHERE: +0.25

## 2023-05-03 ASSESSMENT — VISUAL ACUITY
OS_CC: 20/20
CORRECTION_TYPE: CONTACTS
VA_OR_OD_CURRENT_RX: 20/30
METHOD: SNELLEN - LINEAR
OD_CC: 20/60
VA_OR_OS_CURRENT_RX: 20/20

## 2023-05-03 ASSESSMENT — CUP TO DISC RATIO
OS_RATIO: 0.45
OD_RATIO: 0.45

## 2023-05-03 ASSESSMENT — TONOMETRY
OS_IOP_MMHG: 9
OD_IOP_MMHG: 10
IOP_METHOD: ICARE

## 2023-05-03 ASSESSMENT — SLIT LAMP EXAM - LIDS
COMMENTS: 1+ BLEPH, THICKENED MARGINS, 1-2+ MGD
COMMENTS: 1+ BLEPH, THICKENED MARGINS, 1-2+ MGD

## 2023-05-03 NOTE — TELEPHONE ENCOUNTER
Records Requested     May 3, 2023 8:36 AM  [id number]   Facility  [facility] imaging release   MRN: [id number]   Outcome Sent a fax for images to be pushed       DIAGNOSIS: Right hip   APPOINTMENT DATE: 5/4/23   NOTES STATUS DETAILS   OFFICE NOTE from other specialist Internal 5/17/22 Waqar Cabrera, PATRIC     OPERATIVE REPORT Care Everywhere [facility]   3/31/23 FLUOROSCOPICALLY-GUIDED PERCUTANEOUS VERTEBRAL BODY BONE BIOPSY (Case: [id number] )     MEDICATION LIST Internal    MRI Internal      Care everywhere  Internal: PACS   MR ABD: 11/18/22    [facility]: pending   MR  Lumbar Spine: 4/28/23, 3/30/23  MR HIP: 4/28/23   DEXA (osteoporosis/bone health) Internal Internal: PACS   9/6/22   XRAYS (IMAGES & REPORTS) Internal      Care everywhere  Internal: PACS   XR LUMBAR: 7/14/21    [facility]: pending   XR Pelvis: 6/4/15

## 2023-05-03 NOTE — PROGRESS NOTES
A/P  1.) Dry Eye OU  -s/p BMT 08/2021  -Worsening dryness right eye>left eye, symptomatic for photophobia/tearing  -Failed: Restasis/Xiidra (stinging), plugs (scarred puncta), BCL (retention)  -Chronic oral steroid use  -Now excellent response to scleral lens - corneal staining resolved OU. Much improved ocular comfort.   -Wife helping with I&R. Pt on oral steroids causing hands to shake, unable to do himself. Continue daily lens wear when able  -Reduce FML to every day only now, BID only on days not wearing lenses, PFAT prn    2.) Cataracts OU  -Progressed by chronic steroid use  -Visually significant, BCVA 20/30 right eye with myopic shift, 20/25+ left eye  -Reviewed with pt. He has multiple other health issues and would like to defer cataract surgery as long as he has functional vision  -Would increase right CL power for improved vision. Keep up as long as we can with adjusted Rx and can then send to general ophth when ready    Order new right and mail direct. F/u 6 months, sooner prn    I have confirmed the patient's CC, HPI and reviewed Past Medical History, Past Surgical History, Social History, Family History, Problem List, Medication List and agree with Tech note.     Aisha Juarez, ADILENE MONIQUEO SHARDAS

## 2023-05-04 ENCOUNTER — PRE VISIT (OUTPATIENT)
Dept: ORTHOPEDICS | Facility: CLINIC | Age: 63
End: 2023-05-04

## 2023-05-04 RX ORDER — GINSENG 100 MG
100 CAPSULE ORAL DAILY
Qty: 82 TABLET | Refills: 0 | Status: SHIPPED | OUTPATIENT
Start: 2023-05-04 | End: 2023-11-14

## 2023-05-09 ASSESSMENT — ENCOUNTER SYMPTOMS
HEADACHES: 0
SEIZURES: 0
TINGLING: 0
DEPRESSION: 0
DECREASED CONCENTRATION: 0
DIZZINESS: 0
NERVOUS/ANXIOUS: 0
PARALYSIS: 0
NUMBNESS: 0
LOSS OF CONSCIOUSNESS: 0
INSOMNIA: 1
DISTURBANCES IN COORDINATION: 0
SPEECH CHANGE: 0
TREMORS: 0
MEMORY LOSS: 0
BACK PAIN: 1
WEAKNESS: 0
PANIC: 0

## 2023-05-12 ENCOUNTER — OFFICE VISIT (OUTPATIENT)
Dept: ANESTHESIOLOGY | Facility: CLINIC | Age: 63
End: 2023-05-12
Payer: COMMERCIAL

## 2023-05-12 VITALS
HEIGHT: 73 IN | WEIGHT: 240 LBS | HEART RATE: 89 BPM | BODY MASS INDEX: 31.81 KG/M2 | OXYGEN SATURATION: 96 % | DIASTOLIC BLOOD PRESSURE: 79 MMHG | SYSTOLIC BLOOD PRESSURE: 129 MMHG

## 2023-05-12 DIAGNOSIS — M54.50 LUMBAR SPINE PAIN: Primary | ICD-10-CM

## 2023-05-12 DIAGNOSIS — Z51.81 ENCOUNTER FOR THERAPEUTIC DRUG MONITORING: ICD-10-CM

## 2023-05-12 DIAGNOSIS — M46.26 OSTEOMYELITIS OF LUMBAR SPINE (H): ICD-10-CM

## 2023-05-12 DIAGNOSIS — M25.551 RIGHT HIP PAIN: ICD-10-CM

## 2023-05-12 DIAGNOSIS — M54.10 RADICULAR PAIN OF RIGHT LOWER EXTREMITY: ICD-10-CM

## 2023-05-12 DIAGNOSIS — G06.1 ABSCESS IN EPIDURAL SPACE OF LUMBAR SPINE: ICD-10-CM

## 2023-05-12 DIAGNOSIS — Z79.899 CONTROLLED SUBSTANCE AGREEMENT SIGNED: ICD-10-CM

## 2023-05-12 DIAGNOSIS — T86.09 GVHD AS COMPLICATION OF BONE MARROW TRANSPLANT (H): ICD-10-CM

## 2023-05-12 DIAGNOSIS — F11.90 CHRONIC, CONTINUOUS USE OF OPIOIDS: ICD-10-CM

## 2023-05-12 DIAGNOSIS — D89.813 GVHD AS COMPLICATION OF BONE MARROW TRANSPLANT (H): ICD-10-CM

## 2023-05-12 LAB — CREAT UR-MCNC: 165 MG/DL

## 2023-05-12 PROCEDURE — 99000 SPECIMEN HANDLING OFFICE-LAB: CPT | Performed by: PATHOLOGY

## 2023-05-12 PROCEDURE — 80363 OPIOIDS & OPIATE ANALOGS 3/4: CPT | Performed by: PATHOLOGY

## 2023-05-12 PROCEDURE — 99205 OFFICE O/P NEW HI 60 MIN: CPT

## 2023-05-12 RX ORDER — OXYCODONE HYDROCHLORIDE 5 MG/1
5 TABLET ORAL 2 TIMES DAILY PRN
Qty: 20 TABLET | Refills: 0 | Status: SHIPPED | OUTPATIENT
Start: 2023-05-12 | End: 2023-06-15

## 2023-05-12 ASSESSMENT — PAIN SCALES - GENERAL: PAINLEVEL: EXTREME PAIN (9)

## 2023-05-12 NOTE — NURSING NOTE
Patient presents with:  Consult: Consult Right side lower back, hip pain      Extreme Pain (9)     Pain Medications     Analgesics Other Refills Start End     acetaminophen (TYLENOL) 500 MG tablet          Sig: Acetaminophen Oral        active    Class: Historical    Opioid Combinations Refills Start End     oxyCODONE-acetaminophen (PERCOCET) 5-325 MG tablet     3/20/2023     Sig - Route: Take 1 tablet by mouth daily - Oral    Class: Historical    Opioid Agonists Refills Start End     oxyCODONE (ROXICODONE) 5 MG tablet     4/19/2023     Class: Historical          What medications are you using for pain? Oxycodone, tylenol, tramadol, hydroyzine    (New patients only) Have you been seen by another pain clinic/ provider? no    (Return Patients only) What refills are you needing today? No    Expectation not sure - want to get off the Oxycodone

## 2023-05-12 NOTE — PATIENT INSTRUCTIONS
"Pain Physical Therapy:  NO   He is currently working with home PT. May consider pain PT in the future, if needed.     Pain Psychologist to address relaxation, behavioral change, coping style, and other factors important to improvement.  NO    Diagnostic Studies:  Reviewed lumbar MRI in chart - demonstrated multilevel degenerative changes, discitis, epidural space abscess and osteomyelitis.    Medication Management:   Hold Oxycontin for 12 hours, then start Belbuca 150 mcg twice daily. He reports significant side effects with Oxycontin (notably sedation and \"brain fog\"). I think buprenorphine is more ideal for extended use, also has less side effects in comparison to full opioids, including dependence/tolerance and escalating doses.   Monitor for sedation - may cause dizziness or drowsiness. Be careful driving/moving around until you know effects of medication. Avoid concurrent alcohol use.   Hold short acting oxycodone for the first 3 days after starting Belbuca. May continue tylenol as needed during this time. After 3 days, may resume oxycodone 5 mg up to twice daily as needed for breakthrough pain. Refilled for #20 tabs today.   Naloxone - Prescription sent into pharmacy. Advised to  to have on hand at home in case of accidental opioid overdose.   Controlled substance agreement and UDS completed today.     Potential procedures:   Deferred - we may consider in future, once infection fully resolves and cleared by infectious disease.     Other Orders/Referrals:   None     Follow up with PEE Fernandes CNP in 2 weeks via video visit.     Belbuca is an opioid film that works by sticking to the inside of your cheek and dissolving completely-typically within 30 minutes.    Do not use the Oxycontin while on Belbuca.   There are 3 steps you need to follow to use Belbuca: peel, place, and press.     Peel the foil to open the Belbuca packaging  Place the film on your fingers  Press the yellow side of buccal film " against the inside of cheek     Make sure your fingers are clean and dry. Tear open the foil package by folding on the dotted line and tearing down at the perforation, or by carefully cutting the package with scissors.  Remove the buccal film from the package  Wet the inside of your cheek with your tongue or with water.  Place the film on a dry fingertip with the yellow side facing up.  Press the yellow side against the inside of your cheek and hold in place for 5 seconds.  Leave Belbuca on the inside of your cheek until fully dissolved, usually within 30 minutes     Tips for applying BELBUCA       Apply at the same time each day, making sure to follow your healthcare provider's instructions    Never use Belbuca if the pouch seal is broken or the film is damaged in any way    You may need to use 2 fingers to hold the film securely before placing it in your mouth    It helps to press another finger on the outside of your cheek to position the film correctly    Do not apply the film to any area of your mouth with open sores or lesions    Do not place the film too high or too far back on the inside of your cheek    Do not chew or swallow the film; this may decrease the film's effectiveness and could cause choking, overdose, or death    It is normal for the texture of the film to change as it dissolves    Do not eat or drink anything until the film has completely dissolved, usually within 30 minutes     If you have additional questions, please contact us via [email or web address] or by calling the main clinic number at [phone].

## 2023-05-12 NOTE — PROGRESS NOTES
[facility]     Date of visit: 5/12/2023    Assessment:  Nik Nava is a 63 year old male with a past medical history significant for Grave's disease, GVHD s/p bone marrow txp, generalized muscle weakness, ALL in remission, CKD, renal cell carcinoma,  who presents with complaints of low back and RLE pain.     1. Low back/RLE - Onset of pain occurred early March 2023 after fall on ice, had low back and tailbone pain. He then went to ED on 3/30/23 with worsening pain and was found to have epidural space abscess, discitis, osteomyelitis. He is currently following with ED for extended antibiotic course. Of note, he has hx of ALL (in remission), s/p BMT. Lumbar MRIs on 3/30/23 and 4/28/23, in addition to acute red flag findings, noted multilevel degenerative changes. He continues to have significant low back and RLE pain that extends down lateral aspect of leg into ankle. Denies foot paraesthesias, no leg weakness, no other red flag symptoms. On exam, mild TTP in lumbosacral area, focal tenderness of left lower lumbar, no red flag findings. Etiology of pain is likely multifactorial, including osteomyelitis, epidural abscess, underlying degenerative changes of spine, with overlying myofascial component to an extent.     Visit diagnoses:   1. Lumbar spine pain    2. Radicular pain of right lower extremity    3. Right hip pain    4. Osteomyelitis of lumbar spine (H)    5. Abscess in epidural space of lumbar spine    6. 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 Encounter for therapeutic drug monitoring    8. Controlled substance agreement signed    9. Chronic, continuous use of opioids        Plan:  The following recommendations were given to the patient. Diagnosis, treatment options, risks, benefits, and alternatives were discussed, and all questions were answered. The patient expressed understanding of the plan for management.     I am recommending a multidisciplinary treatment  "plan to help this patient better manage his pain.  This includes:     Pain Physical Therapy:  NO   He is currently working with home PT. May consider pain PT in the future, if needed.     Pain Psychologist to address relaxation, behavioral change, coping style, and other factors important to improvement.  NO    Diagnostic Studies:  Reviewed lumbar MRI in chart - demonstrated multilevel degenerative changes, discitis, epidural space abscess and osteomyelitis.    Medication Management:     Hold Oxycontin for 12 hours, then start Belbuca 150 mcg twice daily. He reports significant side effects with Oxycontin (notably sedation and \"brain fog\"). I think buprenorphine is more ideal for extended use, as it has less side effects in comparison to full opioids, including dependence/tolerance and escalating doses. He was on 45 MME of Oxycontin, starting Belbuca dosage per 's dosing guide.   o Monitor for sedation - may cause dizziness or drowsiness. Be careful driving/moving around until you know effects of medication. Avoid concurrent alcohol use.     Hold short acting oxycodone for the first 3 days after starting Belbuca. May continue tylenol as needed during this time. After 3 days, may resume oxycodone 5 mg up to twice daily as needed for breakthrough pain. Refilled for #20 tabs today.     Naloxone - Prescription sent into pharmacy. Advised to  to have on hand at home in case of accidental opioid overdose.     Controlled substance agreement and UDS completed today.     Potential procedures:     Deferred - we may consider in future, once infection fully resolves and cleared by infectious disease.     Other Orders/Referrals:     None     Follow up with PEE Fernandes CNP in 2 weeks via video visit.         Review of Electronic Chart: Today I have also reviewed available medical information in the patient's medical record at [facility] ([facility]) and Care Everywhere (if available), including relevant " "provider notes, laboratory work, and imaging.     Akilah Shah, DNP, APRN, AGNP-C  [facility]         -------------------------------------------------------------------    Subjective     Reason for consultation:    Nik Nava is a 63 year old male who is seen in consultation today at the request of Dr. 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 Do you agree to these terms?       Please see the [facility] health questionnaire which the patient completed and reviewed with me in detail (if available).     Review of [place] Prescription Monitoring Program (): No concern for abuse or misuse of controlled medications based on this report. Reviewed - oxycodone in last 12 months, recent rx for MS contin 15mg #30 tabs    Review of Electronic Chart: Today I have also reviewed available medical information in the patient's medical record at [facility] ([facility]), including relevant provider notes, laboratory work, and imaging.     Pain medications are being prescribed by PCP - MS Contin.     Chief Complaint:    Chief Complaint   Patient presents with     Consult     Consult Right side lower back, hip pain         HPI:     Nik Nava is a 63 year old male presents with a chief complaint of low back and right hip pain, .     The pain has been present for 2+ months .    The pain is Extreme Pain (9) in severity.    The pain is described as throbbing in low back and thigh, \"zingers\" down the leg.   The pain is alleviated by meds " (oxycontin), rest/lying down.    It is exacerbated by prolonged sitting and walking, driving.    Modalities that have been utilized in the past which were helpful include oxycodone while inpatient.    Things that were not helpful, but tried ,include tramadol while inpatient, .    The patient has never tried pain PT, injections (cannot do right now due to osteomyelitis).  The patient otherwise denies bowel or bladder incontinence, parasthesias, weakness, saddle anesthesia, unintentional weight loss, or fever/chills/sweats.   Nik Nava has not been seen at a pain clinic in the past.  He had inpatient consult while hospitalized 3/30 ([facility])  -He had fall on ice 3/5/23, tailbone pain initially, then that has improved but continues to have low back and leg pain.   -He went to ED and admitted to hospital ([facility]) for osetomyelitis in the low back.   -He had pain team consult while inpatient, was started on oxycodone, tramadol, and hydroxyzine.   -Denies paraesthesias in feet.   -Denies weakness.   -He follows with Inf Dis, still on antibiotics.   -He finds MS contin helpful but he has significant sedation.   -His wife notes improvement in functioning after starting Oxycontin.   -He has home PT right now, chiropractor in the past.   -Oxycodone 5 mg at bedtime PRN prior to fall/infection  -Daughter Luis Miguel is present for visit, wife Lamar on the phone.       Pain Questionnaire    What number best describes your pain right now: 4  (0 = No pain to 10 = Worst pain imaginable)    How would you describe the pain? sharp, dull, aching, throbbing    Which of the following worsen your pain? standing, sitting, walking, exercise    Which of the following improve or reduce your pain? lying down, medication, relaxation    What number best describes your average pain for the past week: 5  (0 = No pain to 10 = Worst pain imaginable)    What number best describes your LOWEST pain in past 24 hours: 2  (0 = No pain to 10 = Worst pain  imaginable)    What number best describes your WORST pain in past 24 hours: 6  (0 = No pain to 10 = Worst pain imaginable)    When is your pain worst? PM    What non-medicine treatments have you already had for your pain? physical therapy, chiropractic care    Have you tried treating your pain with medication? Yes    If yes, please answer the below questions -     What topical medications have you tried in the past but are no longer taking? Diclofenac (Voltaren) gel    What anti-convulsants (seizure medicines) have you tried in the past but are no longer taking? None    What anti-depressant medication have you tried in the past but are no longer taking? None    What sleep aid medications have you tried in the past but are no longer taking? Hydroxyzine (Atarax, Vistaril)    What opioid medications have you tried in the past but are no longer taking? Tramadol (Ultram)    What NSAID medications have you tried in the past but are no longer taking?      What muscle relaxer medications have you tried in the past but are no longer taking? Methocarbamol (Robaxin)    What anti-migraine medications have you tried in the past but are no longer taking? None      Are you currently taking medications for your pain? Yes    If yes, please answer below -     During the past month, list all the medications that you have used for pain. Please list drug name, dose, and frequency taking: Oxycontin 15mg 1 tab e 12 hr, oxycodone 5 mg 1 tab at bedtime as needed; Tramadol 50 mg 1 tab, 3 times  a day as needed for pain; hydroxyzine HCL 10 mg tab 1 tab every 6 hrs as needed (not taking hydroxyzine, oxycodone or tramadol) Voltaren gel evry 6 hrs      Current Pain Treatments:    1. Medications:       Tylenol 1000 mg    Belbuca 150 mcg BID   Oxycodone 5 mg BID PRN (advised to hold for first 3 days on Belbuca)   Naloxone - sent into pharmacy today     2. Other therapies:    Inf Disease - currently on antibiotic therapy    Home PT      Current  Outpatient Medications   Medication     acetaminophen (TYLENOL) 500 MG tablet     acyclovir (ZOVIRAX) 800 MG tablet     acyclovir (ZOVIRAX) 800 MG tablet     azithromycin (ZITHROMAX) 250 MG tablet     azithromycin (ZITHROMAX) 250 MG tablet     Belumosudil Mesylate 200 MG TABS     Buprenorphine HCl (BELBUCA) 150 MCG FILM buccal film     Carboxymethylcell-Glycerin PF (REFRESH RELIEVA PF) 0.5-1 % SOLN     carboxymethylcellulose PF (CARBOXYMETHYLCELLULOSE SODIUM) 0.5 % ophthalmic solution     dexamethasone alcohol-free (DECADRON) 0.1 MG/ML solution     erythromycin (ROMYCIN) 5 MG/GM ophthalmic ointment     escitalopram (LEXAPRO) 10 MG tablet     ferrous sulfate (FE TABS) 325 (65 Fe) MG EC tablet     fluorometholone (FML LIQUIFILM) 0.1 % ophthalmic suspension     fluorometholone (FML LIQUIFILM) 0.1 % ophthalmic suspension     hydrochlorothiazide (HYDRODIURIL) 12.5 MG tablet     levothyroxine (SYNTHROID/LEVOTHROID) 150 MCG tablet     levothyroxine (SYNTHROID/LEVOTHROID) 150 MCG tablet     LORazepam (ATIVAN) 1 MG tablet     LORazepam (ATIVAN) 1 MG tablet     magnesium oxide (MAG-OX) 400 MG tablet     naloxone (NARCAN) 4 MG/0.1ML nasal spray     nicotine polacrilex (NICORETTE) 4 MG gum     Omega-3 Fish Oil 500 MG capsule     omeprazole (PRILOSEC) 40 MG DR capsule     omeprazole (PRILOSEC) 40 MG DR capsule     ondansetron (ZOFRAN ODT) 8 MG ODT tab     ondansetron (ZOFRAN ODT) 8 MG ODT tab     oxyCODONE (ROXICODONE) 5 MG tablet     oxyCODONE (ROXICODONE) 5 MG tablet     oxyCODONE-acetaminophen (PERCOCET) 5-325 MG tablet     posaconazole (NOXAFIL) 100 MG EC tablet     predniSONE (DELTASONE) 1 MG tablet     predniSONE (DELTASONE) 5 MG tablet     REZUROCK 200 MG TABS     rosuvastatin (CRESTOR) 5 MG tablet     sennosides (SENOKOT) 8.6 MG tablet     sodium chloride 0.9 % neb solution     UNABLE TO FIND     zinc 50 MG TABS     No current facility-administered medications for this visit.     Allergies   Allergen Reactions      Sulfamethoxazole-Trimethoprim Rash      Past Medical History:   Diagnosis Date     ALL (acute lymphocytic leukemia) (H)      CKD (chronic kidney disease)      GVHD (graft versus host disease) (H)      H/O peripheral stem cell transplant (H)      Hyperthyroidism 1996     Infection due to 2019 novel coronavirus 01/16/2023     Influenza A 11/2022     Postablative hypothyroidism 1997     Pulmonary embolism (H)      Renal cell carcinoma (H) 2007    right kidney     Sleep apnea      Past Surgical History:   Procedure Laterality Date     BACK SURGERY  2017     BONE MARROW BIOPSY, BONE SPECIMEN, NEEDLE/TROCAR Left 09/02/2021    Procedure: BIOPSY, BONE MARROW;  Surgeon: Jailyn Ny APRN CNP;  Location: [facility] OR     BONE MARROW BIOPSY, BONE SPECIMEN, NEEDLE/TROCAR Left 11/15/2021    Procedure: BIOPSY, BONE MARROW;  Surgeon: Socrates De La Torre;  Location: [facility] OR     BONE MARROW BIOPSY, BONE SPECIMEN, NEEDLE/TROCAR Left 02/07/2022    Procedure: BIOPSY, BONE MARROW;  Surgeon: Renetta Wiggins PA-C;  Location: [facility] OR     BONE MARROW BIOPSY, BONE SPECIMEN, NEEDLE/TROCAR Left 08/18/2022    Procedure: BIOPSY, BONE MARROW;  Surgeon: Lorrie Yap PA-C;  [place]: [facility]     BRONCHOSCOPY (RIGID OR FLEXIBLE), DIAGNOSTIC N/A 04/29/2022    Procedure: BRONCHOSCOPY, WITH BRONCHOALVEOLAR LAVAGE;  Surgeon: Murtaza Garcia MD;  Location: [facility] GI     IR CVC TUNNEL PLACEMENT > 5 YRS OF AGE  08/04/2021     IR CVC TUNNEL REMOVAL LEFT  09/02/2021     IR LIVER BIOPSY PERCUTANEOUS  02/21/2022     NEPHRECTOMY  2007     PERCUTANEOUS BIOPSY LIVER N/A 02/21/2022    Procedure: NEEDLE BIOPSY, LIVER, PERCUTANEOUS;  Surgeon: Trace Castellanos MD;  Location: [facility]     Family History   Problem Relation Age of Onset     Lung Cancer Mother      Depression Mother      Polycythemia Father      Anesthesia Reaction Father      Hypothyroidism Sister      Coronary Artery Disease Maternal Grandmother      Diabetes Maternal Grandmother       Hypertension Maternal Grandmother      Social History     Socioeconomic History     Marital status:    Tobacco Use     Smoking status: Former     Packs/day: 2.00     Years: 30.00     Pack years: 60.00     Types: Cigarettes     Quit date: 2019     Years since quittin.0     Smokeless tobacco: Never   Substance and Sexual Activity     Alcohol use: Not Currently     Drug use: Never     Social Determinants of Health     Intimate Partner Violence: Not At Risk (2022)    Humiliation, Afraid, Rape, and Kick questionnaire      Fear of Current or Ex-Partner: No      Emotionally Abused: No      Physically Abused: No      Sexually Abused: No      ROS: 10 point ROS neg other than the symptoms noted above in the HPI.      Objective      Diagnostic Testing - Imaging/Labs:    Labs:    CMP 23 - GFR of 83, hepatic function WNL    Imaging:       MR Spine Lumbar w/wo Contrast  Order: [id number]  Impression    1.  Overall constellation of imaging findings suggests infectious/inflammatory process centered at L5-S1 with L5-S1 discitis, adjacent vertebral body osteomyelitis with marrow/subchondral edema involving the vertebral bodies, as well as ventral epidural abscess. Ventral epidural abscess extends from L5 to the upper sacral level and deforms the L5-S1 and S1 level thecal sac and may contribute to left-sided sacral radicular symptoms. Increased linear enhancement of some of the nerve roots of the cauda equina to the left of midline may reflect secondary inflammatory radiculitis enhancement.     2.  No retroperitoneal abscess or adenopathy noted. Right nephrectomy changes noted. Nothing to suggest a marrow infiltrative process overall.     3.  Underlying degenerative and postoperative changes lumbar spine most notable L1-L2 and L4-L5.     4.  Full description and details provided above.     Discussed with Dr. Chris Restrepo 3/30/2023 12:12 PM.     Key images: Series 5 image 11, series 13 image 11, series 14 image  15.  Narrative    For Patients: As a result of the 21st Century Cures Act, medical imaging exams and procedure reports are released immediately into your electronic medical record. You may view this report before your referring provider. If you have questions, please contact your health care provider.     EXAM: MR SPINE LUMBAR WWO   LOCATION: [facility]   DATE/TIME: 3/30/2023 11:57 AM     INDICATION: Low back pain, symptoms persist with > 6 wks treatment, ALL cancer, low back pain.   COMPARISON: Body CT 3/20/2021.   CONTRAST: Gadavist 7.5 mL   TECHNIQUE: Routine Lumbar Spine MRI without and with IV contrast.     FINDINGS: Patient has history of neoplasm reported status post right nephrectomy. Prior CT 3/20/2021 was reviewed.     Nomenclature is based on 5 lumbar-type vertebral bodies. No compression fractures. Satisfactory vertebral body height. Satisfactory alignment. There is abnormal linear intradiscal nonenhancing hyperintense T2 signal L5-S1 posteriorly. Adjacent endplate and subchondral enhancing hyperintense STIR signal/edema about the L5-S1 interspace. There is some indistinctness of the endplates about the L5-S1 interspace particularly to the left of midline. Increased ventral epidural and dorsal epidural enhancement at and below L5 extends to the upper sacrum. There is heterogeneous signal as evidenced by increased T2 and diminished/decreased T1 signal within the prominent ventral epidural enhancement at L5, and upper sacrum. This measures about 11 mm AP dimension to the left of midline seen series 14 image 50 with resulting deformity of the thecal sac, with increased enhancement circumferentially about the thecal sac. Abnormal enhancement extends about the S1 nerve root sleeves as well within the S1 subarticular zones with additional heterogeneous signal abnormality within the ventral epidural space of the upper sacrum ventrally. Overall, imaging findings are worrisome for L5-S1 discitis with adjacent  L5-S1 vertebral body osteomyelitis and ventral epidural abscess L5 to the upper sacrum. The abnormal ventral epidural sacral enhancement with abscess contributes to effacement of the thecal sac particularly at and to the left of midline and lateral aspects at L5-S1 and S1 level, may correlate to left-sided sacral radicular symptom presentation. Deformity and displacement specifically identified of the traversing left S1 nerve root series 12 image 38. No additional evidence for multifocal discitis/osteomyelitis or abscess elsewhere in the visualized lower thoracic spine or lumbar spine. The conus medullaris tip is at the L1 level with satisfactory cord signal/enhancement characteristics. There is some increased enhancement generally of the lower nerve roots of the cauda equina to the left of midline which may reflect inflammatory radicular enhancement. This is best seen series 13 images 10-12. Small degree of left-sided presacral edema is suggested. Mild left hemisacral edema. No vertebral body edema otherwise noted lower thoracic spine or L1-L4. Degenerative changes both SI joints and lower lumbar spine facet joints with rightward lumbar curve. No retroperitoneal abscess. Symmetric psoas muscular appearance with respect to signal/enhancement and size. Normal caliber abdominal aorta. Status post right nephrectomy. Left renal contour is satisfactory where visualized. No retroperitoneal adenopathy, hemorrhage or fluid collections are noted. No retroperitoneal inflammatory process is specifically identified.     T12-L1: Normal disc height and signal. No herniation. Normal facets. No spinal canal or neural foraminal stenosis.     L1-L2: Moderate loss of disc height. Generalized disc bulge with shallow left paracentral disc protrusion. Mild spinal stenosis without foraminal narrowing. Facet joints are normal.     L2-L3: Normal disc height with annular bulge. No herniation. Mild facet joint hypertrophic changes. No spinal  canal or neural foraminal stenosis.     L3-L4: Annular bulge asymmetric to the left with mild left foraminal narrowing. No disc herniation. No spinal stenosis or right foraminal narrowing. Mild facet joint arthropathy bilaterally.     L4-L5: Right laminotomy changes with mild loss of disc height. Generalized disc bulge. Shallow broad-based central disc protrusion with annular tear. The endplates about L4-L5 are intact with Modic type II endplate signal changes noted. No convincing evidence for discitis at this level, the intradiscal high T2 signal should represent an annular tear. No spinal stenosis. Mild foraminal narrowing on the left without right foraminal stenosis. Mild encroachment/narrowing of the L5 subarticular zones right more than left with moderate facet joint arthropathy.     L5-S1: Abnormal intradiscal high T2 signal linear zone posteriorly within the mildly narrowed interspace, adjacent enhancing edema about the indistinct endplates L5-S1, abnormal ventral epidural signal/enhancement all compatible with L5-S1 level centered discitis, adjacent osteomyelitis and ventral epidural abscess. Abscess extends ventrally into the upper sacrum/S2 level and deforms the thecal sac with effective moderate to severe spinal stenosis as well as moderate to severe narrowing left S1 subarticular zone, correlate for left S1 and/or possible left S2 radicular symptoms. Moderate foraminal compromise is present left more than right. Mild facet joint arthropathy.      Impression    1.  The previously seen ventral epidural abscess has resolved with small amount of residual ventral epidural phlegmon.   2.  Marrow edema about the L5-S1 endplates has mildly worsened within new rim-enhancing subchondral lesion in the left S1 superior endplate. This could reflect progression of osteomyelitis.  Narrative    For Patients: As a result of the 21st Century Cures Act, medical imaging exams and procedure reports are released immediately  into your electronic medical record. You may view this report before your referring provider. If you have questions, please contact your health care provider.     EXAM: MR SPINE LUMBAR WWO   LOCATION: [facility]   DATE/TIME: 4/28/2023 10:19 AM CDT     INDICATION: Osteomyelitis Of Other Site, Unspecified Type (hc)   COMPARISON: None.   CONTRAST: Gadavist 10   TECHNIQUE: Routine Lumbar Spine MRI without and with IV contrast.     FINDINGS:   Worsened marrow edema and enhanced centered at the dorsal L5-S1 endplates. Interval development of rim-enhancing T2 STIR hyperintense area within the left lateral S1 superior endplate. Resolution of the rim-enhancing collection within the ventral cervical facet thecal sac with residual phlegmon. No new epidural phlegmon or abscess.       Unchanged alignment. Unchanged body heights. No significant change in multilevel lumbar spondylosis. No high-grade spinal canal or neural foraminal stenosis. No abnormal cord signal. Cauda equina nerve roots are normal. Conus medullaris terminates at the level of L1. No abnormal intramedullary or leptomeningeal enhancement.        Physical Exam  HENT:      Head: Normocephalic.   Pulmonary:      Effort: Pulmonary effort is normal.   Musculoskeletal:         General: Tenderness present.   Neurological:      Mental Status: He is alert and oriented to person, place, and time.      Comments: Gait intact.   Psychiatric:         Mood and Affect: Mood normal.             BILLING TIME DOCUMENTATION:   The total TIME spent on this patient on the date of the encounter/appointment was 75 minutes.      TOTAL TIME includes:   Time spent preparing to see the patient (reviewing records and tests)   Time spent face to face (or over the phone) with the patient   Time spent ordering tests, medications, procedures and referrals   Time spent Referring and communicating with other healthcare professionals   Time spent documenting clinical information in Epic

## 2023-05-12 NOTE — LETTER
Opioid / Opioid Plus Controlled Substance Agreement    This is an agreement between you and your provider about the safe and appropriate use of controlled substance/opioids prescribed by your care team. Controlled substances are medicines that can cause physical and mental dependence (abuse).    There are strict laws about having and using these medicines. We here at [facility] are committing to working with you in your efforts to get better. To support you in this work, we ll help you schedule regular office appointments for medicine refills. If we must cancel or change your appointment for any reason, we ll make sure you have enough medicine to last until your next appointment.     As a Provider, I will:  Listen carefully to your concerns and treat you with respect.   Recommend a treatment plan that I believe is in your best interest. This plan may involve therapies other than opioid pain medication.   Talk with you often about the possible benefits, and the risk of harm of any medicine that we prescribe for you.   Provide a plan on how to taper (discontinue or go off) using this medicine if the decision is made to stop its use.    As a Patient, I understand that opioid(s):   Are a controlled substance prescribed by my care team to help me function or work and manage my condition(s).   Are strong medicines and can cause serious side effects such as:  Drowsiness, which can seriously affect my driving ability  A lower breathing rate, enough to cause death  Harm to my thinking ability   Depression   Abuse of and addiction to this medicine  Need to be taken exactly as prescribed. Combining opioids with certain medicines or chemicals (such as illegal drugs, sedatives, sleeping pills, and benzodiazepines) can be dangerous or even fatal. If I stop opioids suddenly, I may have severe withdrawal symptoms.  Do not work for all types of pain nor for all patients. If they re not helpful, I may be asked to stop  them.      The risks, benefits and side effects of these medicine(s) were explained to me. I agree that:  I will take part in other treatments as advised by my care team. This may be psychiatry or counseling, physical therapy, behavioral therapy, group treatment or a referral to a specialist.     I will keep all my appointments. I understand that this is part of the monitoring of opioids. My care team may require an office visit for EVERY opioid/controlled substance refill. If I miss appointments or don t follow instructions, my care team may stop my medicine.    I will take my medicines as prescribed. I will not change the dose or schedule unless my care team tells me to. There will be no refills if I run out early.     I may be asked to come to the clinic and complete a urine drug test or complete a pill count at any time. If I don t give a urine sample or participate in a pill count, the care team may stop my medicine.    I will only receive prescriptions from this clinic for chronic pain. If I am treated by another provider for acute pain issues, I will tell them that I am taking opioid pain medication for chronic pain and that I have a treatment agreement with this provider. I will inform my [facility] care team within one business day if I am given a prescription for any pain medication by another healthcare provider. My [facility] care team can contact other providers and pharmacists about my use of any medicines.    It is up to me to make sure that I don t run out of my medicines on weekends or holidays. If my care team is willing to refill my opioid prescription without a visit, I must request refills only during office hours. Refills may take up to 7 business days to process. I will use one pharmacy to fill all my opioid and other controlled substance prescriptions. I will notify the clinic about any changes to my insurance or medication availability.    I am responsible for my prescriptions.  If the medicine/prescription is lost, stolen or destroyed, it will not be replaced. I also agree not to share controlled substance medicines with anyone.    I am aware I should not use any illegal or recreational drugs. I agree not to drink alcohol unless my care team says I can.       If I enroll in the [place] [facility] program, I will tell my care team prior to my next refill.     I will tell my care team right away if I become pregnant, have a new medical problem treated outside of my regular clinic, or have a change in my medications.    I understand that this medicine can affect my thinking, judgment and reaction time. Alcohol and drugs affect the brain and body, which can affect the safety of my driving. Being under the influence of alcohol or drugs can affect my decision-making, behaviors, personal safety, and the safety of others. Driving while impaired (DWI) can occur if a person is driving, operating, or in physical control of a car, motorcycle, boat, snowmobile, ATV, motorbike, off-road vehicle, or any other motor vehicle ([place] Statute 169A.20). I understand the risk if I choose to drive or operate any vehicle or machinery.    I understand that if I do not follow any of the conditions above, my prescriptions or treatment may be stopped or changed.          Opioids  What You Need to Know    What are opioids?   Opioids are pain medicines that must be prescribed by a doctor. They are also known as narcotics.     Examples are:   morphine (MS Contin, Mera)  oxycodone (Oxycontin)  oxycodone and acetaminophen (Percocet)  hydrocodone and acetaminophen (Vicodin, Norco)   fentanyl patch (Duragesic)   hydromorphone (Dilaudid)   methadone  codeine (Tylenol #3)     What do opioids do well?   Opioids are best for severe short-term pain such as after a surgery or injury. They may work well for cancer pain. They may help some people with long-lasting (chronic) pain.     What do opioids NOT do well?   Opioids  never get rid of pain entirely, and they don t work well for most patients with chronic pain. Opioids don t reduce swelling, one of the causes of pain.                                    Other ways to manage chronic pain and improve function include:     Treat the health problem that may be causing pain  Anti-inflammation medicines, which reduce swelling and tenderness, such as ibuprofen (Advil, Motrin) or naproxen (Aleve)  Acetaminophen (Tylenol)  Antidepressants and anti-seizure medicines, especially for nerve pain  Topical treatments such as patches or creams  Injections or nerve blocks  Chiropractic or osteopathic treatment  Acupuncture, massage, deep breathing, meditation, visual imagery, aromatherapy  Use heat or ice at the pain site  Physical therapy   Exercise  Stop smoking  Take part in therapy       Risks and side effects     Talk to your doctor before you start or decide to keep taking opioids. Possible side effects include:    Lowering your breathing rate enough to cause death  Overdose, including death, especially if taking higher than prescribed doses  Worse depression symptoms; less pleasure in things you usually enjoy  Feeling tired or sluggish  Slower thoughts or cloudy thinking  Being more sensitive to pain over time; pain is harder to control  Trouble sleeping or restless sleep  Changes in hormone levels (for example, less testosterone)  Changes in sex drive or ability to have sex  Constipation  Unsafe driving  Itching and sweating  Dizziness  Nausea, throwing up and dry mouth    What else should I know about opioids?    Opioids may lead to dependence, tolerance, or addiction.    Dependence means that if you stop or reduce the medicine too quickly, you will have withdrawal symptoms. These include loose poop (diarrhea), jitters, flu-like symptoms, nervousness and tremors. Dependence is not the same as addiction.                     Tolerance means needing higher doses over time to get the same  effect. This may increase the chance of serious side effects.    Addiction is when people improperly use a substance that harms their body, their mind or their relations with others. Use of opiates can cause a relapse of addiction if you have a history of drug or alcohol abuse.    People who have used opioids for a long time may have a lower quality of life, worse depression, higher levels of pain and more visits to doctors.    You can overdose on opioids. Take these steps to lower your risk of overdose:    Recognize the signs:  Signs of overdose include decrease or loss of consciousness (blackout), slowed breathing, trouble waking up and blue lips. If someone is worried about overdose, they should call 911.    Talk to your doctor about Narcan (naloxone).   If you are at risk for overdose, you may be given a prescription for Narcan. This medicine very quickly reverses the effects of opioids.   If you overdose, a friend or family member can give you Narcan while waiting for the ambulance. They need to know the signs of overdose and how to give Narcan.     Don't use alcohol or street drugs.   Taking them with opioids can cause death.    Do not take any of these medicines unless your doctor says it s OK. Taking these with opioids can cause death:  Benzodiazepines, such as lorazepam (Ativan), alprazolam (Xanax) or diazepam (Valium)  Muscle relaxers, such as cyclobenzaprine (Flexeril)  Sleeping pills like zolpidem (Ambien)   Other opioids      How to keep you and other people safe while taking opioids:    Never share your opioids with others.  Opioid medicines are regulated by the Drug Enforcement Agency ([employer]). Selling or sharing medications is a criminal act.    2. Be sure to store opioids in a secure place, locked up if possible. Young children can easily swallow them and overdose.    3. When you are traveling with your medicines, keep them in the original bottles. If you use a pill box, be sure you also carry a copy  of your medicine list from your clinic or pharmacy.    4. Safe disposal of opioids    Most pharmacies have places to get rid of medicine, called disposal kiosks. Medicine disposal options are also available in every [facility]. Search your county and  medication disposal  to find more options. You can find more details at:  [email or web address]://[email or web address]-green/[email or web address]-[email or web address]-medications     I agree that my provider, clinic care team, and pharmacy may work with any city, state or federal law enforcement agency that investigates the misuse, sale, or other diversion of my controlled medicine. I will allow my provider to discuss my care with, or share a copy of, this agreement with any other treating provider, pharmacy or emergency room where I receive care.    I have read this agreement and have asked questions about anything I did not understand.    _______________________________________________________  Patient Signature - Nik Nava _____________________                   Date     _______________________________________________________  Provider Signature - PEE Fernandes CNP   _____________________                   Date     _______________________________________________________  Witness Signature (required if provider not present while patient signing)   _____________________                   Date

## 2023-05-12 NOTE — LETTER
5/12/2023       RE: Nik Nava  [address] [facility] [id number]       Dear Colleague,    Thank you for referring your patient, Nik Nava, to the [facility] FOR COMPREHENSIVE PAIN MANAGEMENT [place] at [facility]. Please see a copy of my visit note below.      [facility]     Date of visit: 5/12/2023    Assessment:  Nik Nava is a 63 year old male with a past medical history significant for Grave's disease, GVHD s/p bone marrow txp, generalized muscle weakness, ALL in remission, CKD, renal cell carcinoma,  who presents with complaints of low back and RLE pain.     Low back/RLE - Onset of pain occurred early March 2023 after fall on ice, had low back and tailbone pain. He then went to ED on 3/30/23 with worsening pain and was found to have epidural space abscess, discitis, osteomyelitis. He is currently following with ED for extended antibiotic course. Of note, he has hx of ALL (in remission), s/p BMT. Lumbar MRIs on 3/30/23 and 4/28/23, in addition to acute red flag findings, noted multilevel degenerative changes. He continues to have significant low back and RLE pain that extends down lateral aspect of leg into ankle. Denies foot paraesthesias, no leg weakness, no other red flag symptoms. On exam, mild TTP in lumbosacral area, focal tenderness of left lower lumbar, no red flag findings. Etiology of pain is likely multifactorial, including osteomyelitis, epidural abscess, underlying degenerative changes of spine, with overlying myofascial component to an extent.     Visit diagnoses:   1. Lumbar spine pain    2. Radicular pain of right lower extremity    3. Right hip pain    4. Osteomyelitis of lumbar spine (H)    5. Abscess in epidural space of lumbar spine    6. 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 Encounter for therapeutic drug monitoring    8. Controlled substance agreement signed   "  9. Chronic, continuous use of opioids        Plan:  The following recommendations were given to the patient. Diagnosis, treatment options, risks, benefits, and alternatives were discussed, and all questions were answered. The patient expressed understanding of the plan for management.     I am recommending a multidisciplinary treatment plan to help this patient better manage his pain.  This includes:     Pain Physical Therapy:  NO   He is currently working with home PT. May consider pain PT in the future, if needed.     Pain Psychologist to address relaxation, behavioral change, coping style, and other factors important to improvement.  NO    Diagnostic Studies:  Reviewed lumbar MRI in chart - demonstrated multilevel degenerative changes, discitis, epidural space abscess and osteomyelitis.    Medication Management:   Hold Oxycontin for 12 hours, then start Belbuca 150 mcg twice daily. He reports significant side effects with Oxycontin (notably sedation and \"brain fog\"). I think buprenorphine is more ideal for extended use, as it has less side effects in comparison to full opioids, including dependence/tolerance and escalating doses. He was on 45 MME of Oxycontin, starting Belbuca dosage per 's dosing guide.   Monitor for sedation - may cause dizziness or drowsiness. Be careful driving/moving around until you know effects of medication. Avoid concurrent alcohol use.   Hold short acting oxycodone for the first 3 days after starting Belbuca. May continue tylenol as needed during this time. After 3 days, may resume oxycodone 5 mg up to twice daily as needed for breakthrough pain. Refilled for #20 tabs today.   Naloxone - Prescription sent into pharmacy. Advised to  to have on hand at home in case of accidental opioid overdose.   Controlled substance agreement and UDS completed today.     Potential procedures:   Deferred - we may consider in future, once infection fully resolves and cleared by " infectious disease.     Other Orders/Referrals:   None     Follow up with PEE Fernandes CNP in 2 weeks via video visit.         Review of Electronic Chart: Today I have also reviewed available medical information in the patient's medical record at [facility] ([facility]) and Care Everywhere (if available), including relevant provider notes, laboratory work, and imaging.     Akilah Shah DNP, PEE, AGNP-C  [facility]         -------------------------------------------------------------------    Subjective     Reason for consultation:    Nik Nava is a 63 year old male who is seen in consultation today at the request of Dr. 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 Do you agree to these terms?       Please see the [facility] health questionnaire which the patient completed and reviewed with me in detail (if available).     Review of [place] Prescription Monitoring Program (): No concern for abuse or misuse of controlled medications based on this report. Reviewed - oxycodone in last 12 months, recent rx for MS contin 15mg #30 tabs    Review of Electronic Chart: Today I have also reviewed available medical information in the patient's medical record at [facility] ([facility] including relevant provider notes, laboratory work, and imaging.     Pain medications are being prescribed by PCP - MS Contin.     Chief Complaint:    Chief Complaint   Patient presents with    Consult     Consult Right side lower back, hip pain  "        HPI:     Nik Nava is a 63 year old male presents with a chief complaint of low back and right hip pain, .     The pain has been present for 2+ months .    The pain is Extreme Pain (9) in severity.    The pain is described as throbbing in low back and thigh, \"zingers\" down the leg.   The pain is alleviated by meds (oxycontin), rest/lying down.    It is exacerbated by prolonged sitting and walking, driving.    Modalities that have been utilized in the past which were helpful include oxycodone while inpatient.    Things that were not helpful, but tried ,include tramadol while inpatient, .    The patient has never tried pain PT, injections (cannot do right now due to osteomyelitis).  The patient otherwise denies bowel or bladder incontinence, parasthesias, weakness, saddle anesthesia, unintentional weight loss, or fever/chills/sweats.   Nik Nava has not been seen at a pain clinic in the past.  He had inpatient consult while hospitalized 3/30 ([facility])  -He had fall on ice 3/5/23, tailbone pain initially, then that has improved but continues to have low back and leg pain.   -He went to ED and admitted to hospital ([facility]) for osetomyelitis in the low back.   -He had pain team consult while inpatient, was started on oxycodone, tramadol, and hydroxyzine.   -Denies paraesthesias in feet.   -Denies weakness.   -He follows with Inf Dis, still on antibiotics.   -He finds MS contin helpful but he has significant sedation.   -His wife notes improvement in functioning after starting Oxycontin.   -He has home PT right now, chiropractor in the past.   -Oxycodone 5 mg at bedtime PRN prior to fall/infection  -Daughter Luis Miguel is present for visit, wife Lamar on the phone.       Pain Questionnaire    What number best describes your pain right now: 4  (0 = No pain to 10 = Worst pain imaginable)    How would you describe the pain? sharp, dull, aching, throbbing    Which of the following worsen your pain? standing, " sitting, walking, exercise    Which of the following improve or reduce your pain? lying down, medication, relaxation    What number best describes your average pain for the past week: 5  (0 = No pain to 10 = Worst pain imaginable)    What number best describes your LOWEST pain in past 24 hours: 2  (0 = No pain to 10 = Worst pain imaginable)    What number best describes your WORST pain in past 24 hours: 6  (0 = No pain to 10 = Worst pain imaginable)    When is your pain worst? PM    What non-medicine treatments have you already had for your pain? physical therapy, chiropractic care    Have you tried treating your pain with medication? Yes    If yes, please answer the below questions -     What topical medications have you tried in the past but are no longer taking? Diclofenac (Voltaren) gel    What anti-convulsants (seizure medicines) have you tried in the past but are no longer taking? None    What anti-depressant medication have you tried in the past but are no longer taking? None    What sleep aid medications have you tried in the past but are no longer taking? Hydroxyzine (Atarax, Vistaril)    What opioid medications have you tried in the past but are no longer taking? Tramadol (Ultram)    What NSAID medications have you tried in the past but are no longer taking?      What muscle relaxer medications have you tried in the past but are no longer taking? Methocarbamol (Robaxin)    What anti-migraine medications have you tried in the past but are no longer taking? None      Are you currently taking medications for your pain? Yes    If yes, please answer below -     During the past month, list all the medications that you have used for pain. Please list drug name, dose, and frequency taking: Oxycontin 15mg 1 tab e 12 hr, oxycodone 5 mg 1 tab at bedtime as needed; Tramadol 50 mg 1 tab, 3 times  a day as needed for pain; hydroxyzine HCL 10 mg tab 1 tab every 6 hrs as needed (not taking hydroxyzine, oxycodone or  tramadol) Voltaren gel evry 6 hrs      Current Pain Treatments:    Medications:       Tylenol 1000 mg    Belbuca 150 mcg BID   Oxycodone 5 mg BID PRN (advised to hold for first 3 days on Belbuca)   Naloxone - sent into pharmacy today     2. Other therapies:    Inf Disease - currently on antibiotic therapy    Home PT      Current Outpatient Medications   Medication    acetaminophen (TYLENOL) 500 MG tablet    acyclovir (ZOVIRAX) 800 MG tablet    acyclovir (ZOVIRAX) 800 MG tablet    azithromycin (ZITHROMAX) 250 MG tablet    azithromycin (ZITHROMAX) 250 MG tablet    Belumosudil Mesylate 200 MG TABS    Buprenorphine HCl (BELBUCA) 150 MCG FILM buccal film    Carboxymethylcell-Glycerin PF (REFRESH RELIEVA PF) 0.5-1 % SOLN    carboxymethylcellulose PF (CARBOXYMETHYLCELLULOSE SODIUM) 0.5 % ophthalmic solution    dexamethasone alcohol-free (DECADRON) 0.1 MG/ML solution    erythromycin (ROMYCIN) 5 MG/GM ophthalmic ointment    escitalopram (LEXAPRO) 10 MG tablet    ferrous sulfate (FE TABS) 325 (65 Fe) MG EC tablet    fluorometholone (FML LIQUIFILM) 0.1 % ophthalmic suspension    fluorometholone (FML LIQUIFILM) 0.1 % ophthalmic suspension    hydrochlorothiazide (HYDRODIURIL) 12.5 MG tablet    levothyroxine (SYNTHROID/LEVOTHROID) 150 MCG tablet    levothyroxine (SYNTHROID/LEVOTHROID) 150 MCG tablet    LORazepam (ATIVAN) 1 MG tablet    LORazepam (ATIVAN) 1 MG tablet    magnesium oxide (MAG-OX) 400 MG tablet    naloxone (NARCAN) 4 MG/0.1ML nasal spray    nicotine polacrilex (NICORETTE) 4 MG gum    Omega-3 Fish Oil 500 MG capsule    omeprazole (PRILOSEC) 40 MG DR capsule    omeprazole (PRILOSEC) 40 MG DR capsule    ondansetron (ZOFRAN ODT) 8 MG ODT tab    ondansetron (ZOFRAN ODT) 8 MG ODT tab    oxyCODONE (ROXICODONE) 5 MG tablet    oxyCODONE (ROXICODONE) 5 MG tablet    oxyCODONE-acetaminophen (PERCOCET) 5-325 MG tablet    posaconazole (NOXAFIL) 100 MG EC tablet    predniSONE (DELTASONE) 1 MG tablet    predniSONE (DELTASONE) 5 MG  tablet    REZUROCK 200 MG TABS    rosuvastatin (CRESTOR) 5 MG tablet    sennosides (SENOKOT) 8.6 MG tablet    sodium chloride 0.9 % neb solution    UNABLE TO FIND    zinc 50 MG TABS     No current facility-administered medications for this visit.     Allergies   Allergen Reactions    Sulfamethoxazole-Trimethoprim Rash      Past Medical History:   Diagnosis Date    ALL (acute lymphocytic leukemia) (H)     CKD (chronic kidney disease)     GVHD (graft versus host disease) (H)     H/O peripheral stem cell transplant (H)     Hyperthyroidism 1996    Infection due to 2019 novel coronavirus 01/16/2023    Influenza A 11/2022    Postablative hypothyroidism 1997    Pulmonary embolism (H)     Renal cell carcinoma (H) 2007    right kidney    Sleep apnea      Past Surgical History:   Procedure Laterality Date    BACK SURGERY  2017    BONE MARROW BIOPSY, BONE SPECIMEN, NEEDLE/TROCAR Left 09/02/2021    Procedure: BIOPSY, BONE MARROW;  Surgeon: Jailyn Ny APRN CNP;  Location: [facility] OR    BONE MARROW BIOPSY, BONE SPECIMEN, NEEDLE/TROCAR Left 11/15/2021    Procedure: BIOPSY, BONE MARROW;  Surgeon: Socrates De La Torre;  Location: [facility] OR    BONE MARROW BIOPSY, BONE SPECIMEN, NEEDLE/TROCAR Left 02/07/2022    Procedure: BIOPSY, BONE MARROW;  Surgeon: Renetta Wiggins PA-C;  Location: [facility] OR    BONE MARROW BIOPSY, BONE SPECIMEN, NEEDLE/TROCAR Left 08/18/2022    Procedure: BIOPSY, BONE MARROW;  Surgeon: Lorrie Yap PA-C;  [place] [facility]    BRONCHOSCOPY (RIGID OR FLEXIBLE), DIAGNOSTIC N/A 04/29/2022    Procedure: BRONCHOSCOPY, WITH BRONCHOALVEOLAR LAVAGE;  Surgeon: Murtaza Garcia MD;  Location: [facility] GI    IR CVC TUNNEL PLACEMENT > 5 YRS OF AGE  08/04/2021    IR CVC TUNNEL REMOVAL LEFT  09/02/2021    IR LIVER BIOPSY PERCUTANEOUS  02/21/2022    NEPHRECTOMY  2007    PERCUTANEOUS BIOPSY LIVER N/A 02/21/2022    Procedure: NEEDLE BIOPSY, LIVER, PERCUTANEOUS;  Surgeon: Trace Castellanos MD;  [place] [facility]      Family History   Problem Relation Age of Onset    Lung Cancer Mother     Depression Mother     Polycythemia Father     Anesthesia Reaction Father     Hypothyroidism Sister     Coronary Artery Disease Maternal Grandmother     Diabetes Maternal Grandmother     Hypertension Maternal Grandmother      Social History     Socioeconomic History    Marital status:    Tobacco Use    Smoking status: Former     Packs/day: 2.00     Years: 30.00     Pack years: 60.00     Types: Cigarettes     Quit date: 2019     Years since quittin.0    Smokeless tobacco: Never   Substance and Sexual Activity    Alcohol use: Not Currently    Drug use: Never     Social Determinants of Health     Intimate Partner Violence: Not At Risk (2022)    Humiliation, Afraid, Rape, and Kick questionnaire     Fear of Current or Ex-Partner: No     Emotionally Abused: No     Physically Abused: No     Sexually Abused: No      ROS: 10 point ROS neg other than the symptoms noted above in the HPI.      Objective      Diagnostic Testing - Imaging/Labs:    Labs:    CMP 23 - GFR of 83, hepatic function WNL    Imaging:       MR Spine Lumbar w/wo Contrast  Order: [id number]  Impression    1.  Overall constellation of imaging findings suggests infectious/inflammatory process centered at L5-S1 with L5-S1 discitis, adjacent vertebral body osteomyelitis with marrow/subchondral edema involving the vertebral bodies, as well as ventral epidural abscess. Ventral epidural abscess extends from L5 to the upper sacral level and deforms the L5-S1 and S1 level thecal sac and may contribute to left-sided sacral radicular symptoms. Increased linear enhancement of some of the nerve roots of the cauda equina to the left of midline may reflect secondary inflammatory radiculitis enhancement.     2.  No retroperitoneal abscess or adenopathy noted. Right nephrectomy changes noted. Nothing to suggest a marrow infiltrative process overall.     3.  Underlying  degenerative and postoperative changes lumbar spine most notable L1-L2 and L4-L5.     4.  Full description and details provided above.     Discussed with Dr. Chris Restrepo 3/30/2023 12:12 PM.     Key images: Series 5 image 11, series 13 image 11, series 14 image 15.  Narrative    For Patients: As a result of the 21st Century Cures Act, medical imaging exams and procedure reports are released immediately into your electronic medical record. You may view this report before your referring provider. If you have questions, please contact your health care provider.     EXAM: MR SPINE LUMBAR WWO   LOCATION: [facility]   DATE/TIME: 3/30/2023 11:57 AM     INDICATION: Low back pain, symptoms persist with > 6 wks treatment, ALL cancer, low back pain.   COMPARISON: Body CT 3/20/2021.   CONTRAST: Gadavist 7.5 mL   TECHNIQUE: Routine Lumbar Spine MRI without and with IV contrast.     FINDINGS: Patient has history of neoplasm reported status post right nephrectomy. Prior CT 3/20/2021 was reviewed.     Nomenclature is based on 5 lumbar-type vertebral bodies. No compression fractures. Satisfactory vertebral body height. Satisfactory alignment. There is abnormal linear intradiscal nonenhancing hyperintense T2 signal L5-S1 posteriorly. Adjacent endplate and subchondral enhancing hyperintense STIR signal/edema about the L5-S1 interspace. There is some indistinctness of the endplates about the L5-S1 interspace particularly to the left of midline. Increased ventral epidural and dorsal epidural enhancement at and below L5 extends to the upper sacrum. There is heterogeneous signal as evidenced by increased T2 and diminished/decreased T1 signal within the prominent ventral epidural enhancement at L5, and upper sacrum. This measures about 11 mm AP dimension to the left of midline seen series 14 image 50 with resulting deformity of the thecal sac, with increased enhancement circumferentially about the thecal sac. Abnormal enhancement extends  about the S1 nerve root sleeves as well within the S1 subarticular zones with additional heterogeneous signal abnormality within the ventral epidural space of the upper sacrum ventrally. Overall, imaging findings are worrisome for L5-S1 discitis with adjacent L5-S1 vertebral body osteomyelitis and ventral epidural abscess L5 to the upper sacrum. The abnormal ventral epidural sacral enhancement with abscess contributes to effacement of the thecal sac particularly at and to the left of midline and lateral aspects at L5-S1 and S1 level, may correlate to left-sided sacral radicular symptom presentation. Deformity and displacement specifically identified of the traversing left S1 nerve root series 12 image 38. No additional evidence for multifocal discitis/osteomyelitis or abscess elsewhere in the visualized lower thoracic spine or lumbar spine. The conus medullaris tip is at the L1 level with satisfactory cord signal/enhancement characteristics. There is some increased enhancement generally of the lower nerve roots of the cauda equina to the left of midline which may reflect inflammatory radicular enhancement. This is best seen series 13 images 10-12. Small degree of left-sided presacral edema is suggested. Mild left hemisacral edema. No vertebral body edema otherwise noted lower thoracic spine or L1-L4. Degenerative changes both SI joints and lower lumbar spine facet joints with rightward lumbar curve. No retroperitoneal abscess. Symmetric psoas muscular appearance with respect to signal/enhancement and size. Normal caliber abdominal aorta. Status post right nephrectomy. Left renal contour is satisfactory where visualized. No retroperitoneal adenopathy, hemorrhage or fluid collections are noted. No retroperitoneal inflammatory process is specifically identified.     T12-L1: Normal disc height and signal. No herniation. Normal facets. No spinal canal or neural foraminal stenosis.     L1-L2: Moderate loss of disc height.  Generalized disc bulge with shallow left paracentral disc protrusion. Mild spinal stenosis without foraminal narrowing. Facet joints are normal.     L2-L3: Normal disc height with annular bulge. No herniation. Mild facet joint hypertrophic changes. No spinal canal or neural foraminal stenosis.     L3-L4: Annular bulge asymmetric to the left with mild left foraminal narrowing. No disc herniation. No spinal stenosis or right foraminal narrowing. Mild facet joint arthropathy bilaterally.     L4-L5: Right laminotomy changes with mild loss of disc height. Generalized disc bulge. Shallow broad-based central disc protrusion with annular tear. The endplates about L4-L5 are intact with Modic type II endplate signal changes noted. No convincing evidence for discitis at this level, the intradiscal high T2 signal should represent an annular tear. No spinal stenosis. Mild foraminal narrowing on the left without right foraminal stenosis. Mild encroachment/narrowing of the L5 subarticular zones right more than left with moderate facet joint arthropathy.     L5-S1: Abnormal intradiscal high T2 signal linear zone posteriorly within the mildly narrowed interspace, adjacent enhancing edema about the indistinct endplates L5-S1, abnormal ventral epidural signal/enhancement all compatible with L5-S1 level centered discitis, adjacent osteomyelitis and ventral epidural abscess. Abscess extends ventrally into the upper sacrum/S2 level and deforms the thecal sac with effective moderate to severe spinal stenosis as well as moderate to severe narrowing left S1 subarticular zone, correlate for left S1 and/or possible left S2 radicular symptoms. Moderate foraminal compromise is present left more than right. Mild facet joint arthropathy.      Impression    1.  The previously seen ventral epidural abscess has resolved with small amount of residual ventral epidural phlegmon.   2.  Marrow edema about the L5-S1 endplates has mildly worsened within  new rim-enhancing subchondral lesion in the left S1 superior endplate. This could reflect progression of osteomyelitis.  Narrative    For Patients: As a result of the 21st Century Cures Act, medical imaging exams and procedure reports are released immediately into your electronic medical record. You may view this report before your referring provider. If you have questions, please contact your health care provider.     EXAM: MR SPINE LUMBAR WWO   LOCATION: [facility]   DATE/TIME: 4/28/2023 10:19 AM CDT     INDICATION: Osteomyelitis Of Other Site, Unspecified Type (hc)   COMPARISON: None.   CONTRAST: Gadavist 10   TECHNIQUE: Routine Lumbar Spine MRI without and with IV contrast.     FINDINGS:   Worsened marrow edema and enhanced centered at the dorsal L5-S1 endplates. Interval development of rim-enhancing T2 STIR hyperintense area within the left lateral S1 superior endplate. Resolution of the rim-enhancing collection within the ventral cervical facet thecal sac with residual phlegmon. No new epidural phlegmon or abscess.       Unchanged alignment. Unchanged body heights. No significant change in multilevel lumbar spondylosis. No high-grade spinal canal or neural foraminal stenosis. No abnormal cord signal. Cauda equina nerve roots are normal. Conus medullaris terminates at the level of L1. No abnormal intramedullary or leptomeningeal enhancement.        Physical Exam  HENT:      Head: Normocephalic.   Pulmonary:      Effort: Pulmonary effort is normal.   Musculoskeletal:         General: Tenderness present.   Neurological:      Mental Status: He is alert and oriented to person, place, and time.      Comments: Gait intact.   Psychiatric:         Mood and Affect: Mood normal.             BILLING TIME DOCUMENTATION:   The total TIME spent on this patient on the date of the encounter/appointment was 75 minutes.      TOTAL TIME includes:   Time spent preparing to see the patient (reviewing records and tests)   Time  spent face to face (or over the phone) with the patient   Time spent ordering tests, medications, procedures and referrals   Time spent Referring and communicating with other healthcare professionals   Time spent documenting clinical information in Epic         Again, thank you for allowing me to participate in the care of your patient.      Sincerely,    PEE Fernandes CNP

## 2023-05-12 NOTE — NURSING NOTE
----- Message from Winton Lesch sent at 9/9/2022  3:51 PM EDT -----  Subject: Medication Problem    Medication: traZODone (DESYREL) 100 MG tablet  Dosage: 100MG  Ordering Provider: emil    Question/Problem: Pt is stating she is having trouble getting this   medication because her insurance is stating they will not pay for this   without her providers approval, problem with company because she is    and using [employer] as last name now. Please advise pt.    Additional Information for Provider:     Ph[facility]: [facility] [address] [place] [address] 22, [address] [phone] [facility] [phone]    ---------------------------------------------------------------------------  --------------  [facility]  [phone]; OK to leave message on voicemail  ---------------------------------------------------------------------------  --------------    SCRIPT ANSWERS  Relationship to Patient: Self RN reviewed AVS with patient. Patient to contact clinic if any questions/concerns. Patient verbalized understanding.    CSA and UDS completed in clinic today.    Bouchra Gavin RNCC

## 2023-05-17 DIAGNOSIS — C91.01 ACUTE LYMPHOBLASTIC LEUKEMIA (ALL) IN REMISSION (H): ICD-10-CM

## 2023-05-17 DIAGNOSIS — T86.09 GVHD AS COMPLICATION OF BONE MARROW TRANSPLANT (H): Primary | ICD-10-CM

## 2023-05-17 DIAGNOSIS — D89.813 GVHD AS COMPLICATION OF BONE MARROW TRANSPLANT (H): Primary | ICD-10-CM

## 2023-05-17 PROCEDURE — 94642 AEROSOL INHALATION TREATMENT: CPT | Performed by: INTERNAL MEDICINE

## 2023-05-17 PROCEDURE — 94640 AIRWAY INHALATION TREATMENT: CPT | Performed by: INTERNAL MEDICINE

## 2023-05-17 RX ORDER — ALBUTEROL SULFATE 0.83 MG/ML
2.5 SOLUTION RESPIRATORY (INHALATION) EVERY 4 HOURS PRN
Status: CANCELLED | OUTPATIENT
Start: 2023-05-17

## 2023-05-17 RX ORDER — PENTAMIDINE ISETHIONATE 300 MG/300MG
300 INHALANT RESPIRATORY (INHALATION)
Status: DISCONTINUED | OUTPATIENT
Start: 2023-05-17 | End: 2023-05-17 | Stop reason: HOSPADM

## 2023-05-17 RX ORDER — PENTAMIDINE ISETHIONATE 300 MG/300MG
300 INHALANT RESPIRATORY (INHALATION)
Status: CANCELLED | OUTPATIENT
Start: 2023-05-17

## 2023-05-17 RX ADMIN — PENTAMIDINE ISETHIONATE 300 MG: 300 INHALANT RESPIRATORY (INHALATION) at 09:13

## 2023-05-17 NOTE — PROGRESS NOTES
Patient was seen today for a Pentamidine nebulizer tx ordered by Dr. JENNIFER MULLEN    Patient was first given  4 PUFFS ALBUTEROL MDI after which Pentamidine 300 mg (Lot #    [id number]) mixed with 6cc Sterile H20 was administered through a filtered nebulizer.    Pre-treatment: SpO2 = 99%   HR = 63BPM   BBS = CLEAR AND SLIGHTLY DIMINISHED   Post-treatment:SpO2 = 98%  HR= 69 BPM   BBS = CLEAR THROUGHOUT    No adverse side effects noted by the patient.    This service today was provided under the direct supervision of Dr. MORALES, who was available if needed.     Procedure was completed by Marla Miller.

## 2023-05-18 LAB
OXYCODONE UR CFM-MCNC: 5860 NG/ML
OXYCODONE/CREAT UR: 3552 NG/MG {CREAT}
OXYMORPHONE UR CFM-MCNC: 1400 NG/ML
OXYMORPHONE/CREAT UR: 848 NG/MG {CREAT}
TRAMADOL CTO UR CFM-MCNC: 88 NG/ML
TRAMADOL/CREAT UR: 53 NG/MG {CREAT}

## 2023-05-24 ENCOUNTER — OFFICE VISIT (OUTPATIENT)
Dept: PALLIATIVE MEDICINE | Facility: CLINIC | Age: 63
End: 2023-05-24
Payer: COMMERCIAL

## 2023-05-24 VITALS — SYSTOLIC BLOOD PRESSURE: 158 MMHG | DIASTOLIC BLOOD PRESSURE: 81 MMHG | HEART RATE: 65 BPM

## 2023-05-24 DIAGNOSIS — M46.26 OSTEOMYELITIS OF LUMBAR SPINE (H): ICD-10-CM

## 2023-05-24 DIAGNOSIS — T86.09 GVHD AS COMPLICATION OF BONE MARROW TRANSPLANT (H): ICD-10-CM

## 2023-05-24 DIAGNOSIS — M54.10 RADICULAR PAIN OF RIGHT LOWER EXTREMITY: ICD-10-CM

## 2023-05-24 DIAGNOSIS — M25.551 HIP PAIN, RIGHT: ICD-10-CM

## 2023-05-24 DIAGNOSIS — M54.50 LUMBAR SPINE PAIN: Primary | ICD-10-CM

## 2023-05-24 DIAGNOSIS — G06.1 ABSCESS IN EPIDURAL SPACE OF LUMBAR SPINE: ICD-10-CM

## 2023-05-24 DIAGNOSIS — D89.813 GVHD AS COMPLICATION OF BONE MARROW TRANSPLANT (H): ICD-10-CM

## 2023-05-24 PROCEDURE — 99215 OFFICE O/P EST HI 40 MIN: CPT

## 2023-05-24 ASSESSMENT — PAIN SCALES - GENERAL: PAINLEVEL: MILD PAIN (3)

## 2023-05-24 NOTE — PROGRESS NOTES
[facility] Pain Management     Date of visit: 5/24/2023      Assessment:   Nik Nava is a 63 year old male with a past medical history significant for Grave's disease, GVHD s/p bone marrow txp, generalized muscle weakness, ALL in remission, CKD, renal cell carcinoma,  who presents with complaints of low back and RLE pain.      1. Low back/RLE - Onset of pain occurred early March 2023 after fall on ice, had low back and tailbone pain. He then went to ED on 3/30/23 with worsening pain and was found to have epidural space abscess, discitis, osteomyelitis. He is currently following with ED for extended antibiotic course. Of note, he has hx of ALL (in remission), s/p BMT. Lumbar MRIs on 3/30/23 and 4/28/23, in addition to acute red flag findings, noted multilevel degenerative changes. He continues to have significant low back and RLE pain that extends down lateral aspect of leg into ankle. Denies foot paraesthesias, no leg weakness, no other red flag symptoms. On exam, mild TTP in lumbosacral area, focal tenderness of left lower lumbar, no red flag findings. Etiology of pain is likely multifactorial, including osteomyelitis, epidural abscess, underlying degenerative changes of spine, with overlying myofascial component to an extent.     Visit Diagnoses:  1. Lumbar spine pain    2. Radicular pain of right lower extremity    3. Hip pain, right    4. Osteomyelitis of lumbar spine (H)    5. 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 Abscess in epidural space of lumbar spine        Plan:  Diagnosis reviewed, treatment option addressed, and risk/benifits discussed.  Self-care instructions given.  I am recommending a multidisciplinary treatment plan to help this patient better manage their pain.      Pain Physical Therapy:  NO   He is currently working with home PT. May consider pain PT in the future, if needed.      Pain Psychologist to address relaxation, behavioral change, coping style, and other  "factors important to improvement.  NO     Diagnostic Studies:  Reviewed lumbar MRI in chart - demonstrated multilevel degenerative changes, discitis, epidural space abscess and osteomyelitis.     Medication Management:     Increase Belbuca to 300 mcg twice daily. He reports significant side effects with Oxycontin (notably sedation and \"brain fog\"), but this has improved since transition to Belbuca. He has not appreciated much difference in pain levels with 150 mcg dosage, so we will increase today. Advised to monitor for improvement in baseline pain and intensity of fluctuations.   ? Monitor for sedation - may cause dizziness or drowsiness. Be careful driving/moving around until you know effects of medication. Avoid concurrent alcohol use.   ? As discussed, if you have marked sedation with increasing both morning and evening dosage, try reducing morning dose to 150 mcg and continue 300 mcg at bedtime for 3-4 days, then try increasing as tolerated.     Continue oxycodone 5 mg up to twice daily as needed for breakthrough pain.     Naloxone - Prescription sent into pharmacy at last visit. Advised to  to have on hand at home in case of accidental opioid overdose.     Controlled substance agreement and UDS completed on 5/12/23.     Apply diclofenac gel to painful joints at least 2 x day, ideally 3-4 x day. This medication works best when used consistently. Monitor for benefit over the next 1-2 weeks.      Potential procedures:     Deferred - we may consider in future, once infection fully resolves and cleared by infectious disease.      Other Orders/Referrals:     None      Follow up with PEE Fernandes CNP in 4 weeks via video visit or in person.       Review of Electronic Chart: Today I have also reviewed available medical information in the patient's medical record at [facility] and Care Everywhere (if available), including relevant provider notes, laboratory work, and imaging.     Akilah Shah, " "PEE ALFARO, RAJEEV-BRANDON  [facility] Pain Management     -------------------------------------------------    Subjective:    Chief complaint:   Chief Complaint   Patient presents with     Pain       Interval history:  Nik Nava is a 63 year old male last seen on 5/12/23.  They are a patient of mine seen in follow up.     Recommendations/plan at the last visit included:  Pain Physical Therapy:  NO   He is currently working with home PT. May consider pain PT in the future, if needed.      Pain Psychologist to address relaxation, behavioral change, coping style, and other factors important to improvement.  NO     Diagnostic Studies:  Reviewed lumbar MRI in chart - demonstrated multilevel degenerative changes, discitis, epidural space abscess and osteomyelitis.     Medication Management:     Hold Oxycontin for 12 hours, then start Belbuca 150 mcg twice daily. He reports significant side effects with Oxycontin (notably sedation and \"brain fog\"). I think buprenorphine is more ideal for extended use, as it has less side effects in comparison to full opioids, including dependence/tolerance and escalating doses. He was on 45 MME of Oxycontin, starting Belbuca dosage per 's dosing guide.   ? Monitor for sedation - may cause dizziness or drowsiness. Be careful driving/moving around until you know effects of medication. Avoid concurrent alcohol use.     Hold short acting oxycodone for the first 3 days after starting Belbuca. May continue tylenol as needed during this time. After 3 days, may resume oxycodone 5 mg up to twice daily as needed for breakthrough pain. Refilled for #20 tabs today.     Naloxone - Prescription sent into pharmacy. Advised to  to have on hand at home in case of accidental opioid overdose.     Controlled substance agreement and UDS completed today.      Potential procedures:     Deferred - we may consider in future, once infection fully resolves and cleared by infectious disease. " "     Other Orders/Referrals:     None      Follow up with PEE Fernandes CNP in 2 weeks via video visit.       Since his last visit, Nik Nava reports:  -his pain is about the same as it was at last visit.   -He was started on Belbuca 150 mcg BID at last visit.   -He was previously taking Oxycontin BID that caused marked sedation.   -He states things are \"okay\" since starting Belbuca, but he is having some oral irritation.   -He has hx of GVHD in his mouth, no open sores but he is concerned about this possibility.  -He has not appreciated significant benefit for pain with Belbuca 150 mcg BID.   -He has noticed improvement in mental clarity.  -No other side effects aside from mild oral irritation.   -He was given oxycodone 5 mg #20 tabs at last visit for breakthrough pain, reports he has only used 1 tablet of this prescription.   -He uses diclofenac gel on low back, right hip and RLE.   -He requests refill for diclofenac gel.   -He finishes long term abx tomorrow.   -He will be following up with Infectious Disease and neurosurgery soon.   -His UDS was positive for tramadol, which he was given prior. Denies tramadol use since last visit.   -We discussed [facility] drug drop boxes for old medications.   -His wife Lamar is present for the visit today.       Pain Information:   Pain rating: averages 2-3/10 on a 0-10 scale.        HPI from initial visit with me on 5/12/23:  Nik Nava is a 63 year old male presents with a chief complaint of low back and right hip pain, .      The pain has been present for 2+ months .    The pain is Extreme Pain (9) in severity.    The pain is described as throbbing in low back and thigh, \"zingers\" down the leg.   The pain is alleviated by meds (oxycontin), rest/lying down.    It is exacerbated by prolonged sitting and walking, driving.    Modalities that have been utilized in the past which were helpful include oxycodone while inpatient.    Things that were not helpful, but tried " ,include tramadol while inpatient, .    The patient has never tried pain PT, injections (cannot do right now due to osteomyelitis).  The patient otherwise denies bowel or bladder incontinence, parasthesias, weakness, saddle anesthesia, unintentional weight loss, or fever/chills/sweats.   Nik Nava has not been seen at a pain clinic in the past.  He had inpatient consult while hospitalized 3/30 ([facility])  -He had fall on ice 3/5/23, tailbone pain initially, then that has improved but continues to have low back and leg pain.   -He went to ED and admitted to hospital ([facility]) for osetomyelitis in the low back.   -He had pain team consult while inpatient, was started on oxycodone, tramadol, and hydroxyzine.   -Denies paraesthesias in feet.   -Denies weakness.   -He follows with Inf Dis, still on antibiotics.   -He finds MS contin helpful but he has significant sedation.   -His wife notes improvement in functioning after starting Oxycontin.   -He has home PT right now, chiropractor in the past.   -Oxycodone 5 mg at bedtime PRN prior to fall/infection  -Daughter Luis Miguel is present for visit, wife Lamar on the phone.         Current Pain Treatments:    1. Medications:                             Tylenol 1000 mg               Belbuca 150 mcg BID              Oxycodone 5 mg BID PRN               Naloxone - sent into pharmacy at prior visit     2. Other therapies:               Inf Disease - currently on antibiotic therapy               Home PT    Current MME: 0-15    Review of [place] Prescription Monitoring Program (): No concern for abuse or misuse of controlled medications based on this report. Reviewed - appears appropriate.     Annual Controlled Substance Agreement/UDS due date: Completed on 5/12/23    Past pain treatments:  Surgery       Medications:  Current Outpatient Medications   Medication Sig Dispense Refill     acetaminophen (TYLENOL) 500 MG tablet Acetaminophen Oral        active       acyclovir (ZOVIRAX)  800 MG tablet Take 1 tablet (800 mg) by mouth 2 times daily 60 tablet 4     acyclovir (ZOVIRAX) 800 MG tablet Take 800 mg by mouth       azithromycin (ZITHROMAX) 250 MG tablet Take 1 tablet by mouth daily       azithromycin (ZITHROMAX) 250 MG tablet Take 1 tablet (250 mg) by mouth daily 30 tablet 3     Belumosudil Mesylate 200 MG TABS Take 200 mg by mouth daily 30 tablet 3     Buprenorphine HCl (BELBUCA) 300 MCG FILM buccal film Place 1 Film (300 mcg) inside cheek every 12 hours 60 Film 0     Carboxymethylcell-Glycerin PF (REFRESH RELIEVA PF) 0.5-1 % SOLN Apply 1 drop to eye 4 times daily Preservative free. Either PF multidose bottle or PF vials 30 each 11     carboxymethylcellulose PF (CARBOXYMETHYLCELLULOSE SODIUM) 0.5 % ophthalmic solution Place 1 drop into both eyes 4 times daily 70 each 11     dexamethasone alcohol-free (DECADRON) 0.1 MG/ML solution Swish and spit 20 mLs (2 mg) in mouth 4 times daily 1000 mL 3     diclofenac (VOLTAREN) 1 % topical gel Apply 4 g topically 4 times daily 350 g 1     erythromycin (ROMYCIN) 5 MG/GM ophthalmic ointment Place 0.5 inches into both eyes At Bedtime (Patient taking differently: Place into both eyes At Bedtime) 3.5 g 4     escitalopram (LEXAPRO) 10 MG tablet        ferrous sulfate (FE TABS) 325 (65 Fe) MG EC tablet Take 325 mg by mouth       fluorometholone (FML LIQUIFILM) 0.1 % ophthalmic suspension Apply 1 drop to eye       fluorometholone (FML LIQUIFILM) 0.1 % ophthalmic suspension Place 1 Drop into both eyes twice daily for 21 days then stop 5 mL 2     hydrochlorothiazide (HYDRODIURIL) 12.5 MG tablet Hydrochlorothiazide Oral    daily     inactive       levothyroxine (SYNTHROID/LEVOTHROID) 150 MCG tablet Take 1 tablet by mouth daily       levothyroxine (SYNTHROID/LEVOTHROID) 150 MCG tablet Take 1 tablet (150 mcg) by mouth daily 30 tablet 11     LORazepam (ATIVAN) 1 MG tablet Take 1 mg by mouth       LORazepam (ATIVAN) 1 MG tablet Take 1 tablet (1 mg) by mouth nightly as  needed for anxiety or sleep 30 tablet 3     magnesium oxide (MAG-OX) 400 MG tablet Take 1 tablet (400 mg) by mouth 2 times daily 120 tablet 1     naloxone (NARCAN) 4 MG/0.1ML nasal spray Spray 1 spray (4 mg) into one nostril alternating nostrils as needed for opioid reversal every 2-3 minutes until assistance arrives 0.2 mL 0     nicotine polacrilex (NICORETTE) 4 MG gum Take 4 mg by mouth as needed for smoking cessation        Omega-3 Fish Oil 500 MG capsule Take 2 capsules (1,000 mg) by mouth daily 120 capsule 3     omeprazole (PRILOSEC) 40 MG DR capsule Take 1 capsule by mouth daily       omeprazole (PRILOSEC) 40 MG DR capsule Take 1 capsule (40 mg) by mouth daily 30 capsule 3     ondansetron (ZOFRAN ODT) 8 MG ODT tab        ondansetron (ZOFRAN ODT) 8 MG ODT tab Take 1 tablet (8 mg) by mouth every 8 hours as needed for nausea 30 tablet 0     oxyCODONE (ROXICODONE) 5 MG tablet Take 1 tablet (5 mg) by mouth 2 times daily as needed for pain 20 tablet 0     oxyCODONE (ROXICODONE) 5 MG tablet        oxyCODONE-acetaminophen (PERCOCET) 5-325 MG tablet Take 1 tablet by mouth daily       posaconazole (NOXAFIL) 100 MG EC tablet Take 3 tablets (300 mg) by mouth every morning 90 tablet 3     predniSONE (DELTASONE) 1 MG tablet Take 4 tablets (4 mg) by mouth daily Take on even days 120 tablet 1     predniSONE (DELTASONE) 5 MG tablet Take 2 tablets (10 mg) by mouth every other day Take on odd days 60 tablet 3     REZUROCK 200 MG TABS TAKE ONE TABLET BY MOUTH ONCE DAILY 30 tablet 3     rosuvastatin (CRESTOR) 5 MG tablet Take 1 tablet (5 mg) by mouth daily 30 tablet 3     sennosides (SENOKOT) 8.6 MG tablet Take 1 tablet by mouth 3 times daily as needed for constipation 90 tablet 0     sodium chloride 0.9 % neb solution 3 mLs by Other route 2 times daily 300 mL 11     UNABLE TO FIND Take 1,200 mg by mouth daily MEDICATION NAME: Flaxseed Oil       zinc 50 MG TABS Take 100 mg by mouth daily Take 100 mg daily for 6 weeks 82 tablet 0        Medical History: any changes in medical history since they were last seen? No      Objective:    Physical Exam:  Blood pressure (!) 158/81, pulse 65.  Constitutional: Well developed, well nourished, appears stated age.  Gait: Intact   HEENT: Head atraumatic, normocephalic. Eyes without conjunctival injection or jaundice. Oropharynx clear. Neck supple. No obvious neck masses.  Skin: No rash, lesions, or petechiae of exposed skin.   Psychiatric/mental status: Alert, without lethargy or stupor. Speech fluent. Appropriate affect. Mood normal. Able to follow commands without difficulty.     Diagnostic Tests/Imaging/Labs:  UDS on 5/12/23 - positive for oxycodone (expected), positive for tramadol (had recent tramadol prescription filled on 4/19/23, denies usage since last visit, no concerns at this point)    BILLING TIME DOCUMENTATION:   The total TIME spent on this patient on the date of the encounter/appointment was 45 minutes.      TOTAL TIME includes:   Time spent preparing to see the patient (reviewing records and tests)   Time spent face to face (or over the phone) with the patient   Time spent ordering tests, medications, procedures and referrals   Time spent Referring and communicating with other healthcare professionals   Time spent documenting clinical information in Epic

## 2023-05-24 NOTE — PATIENT INSTRUCTIONS
"Pain Physical Therapy:  NO   He is currently working with home PT. May consider pain PT in the future, if needed.      Pain Psychologist to address relaxation, behavioral change, coping style, and other factors important to improvement.  NO     Diagnostic Studies:  Reviewed lumbar MRI in chart - demonstrated multilevel degenerative changes, discitis, epidural space abscess and osteomyelitis.     Medication Management:   Increase Belbuca to 300 mcg twice daily. He reports significant side effects with Oxycontin (notably sedation and \"brain fog\"), but this has improved since transition to Belbuca. He has not appreciated much difference in pain levels with 150 mcg dosage, so we will increase today. Advised to monitor for improvement in baseline pain and intensity of fluctuations.   Monitor for sedation - may cause dizziness or drowsiness. Be careful driving/moving around until you know effects of medication. Avoid concurrent alcohol use.   As discussed, if you have marked sedation with increasing both morning and evening dosage, try reducing morning dose to 150 mcg and continue 300 mcg at bedtime for 3-4 days, then try increasing as tolerated.   Continue oxycodone 5 mg up to twice daily as needed for breakthrough pain.   Naloxone - Prescription sent into pharmacy at last visit. Advised to  to have on hand at home in case of accidental opioid overdose.   Controlled substance agreement and UDS completed on 5/12/23.   Apply diclofenac gel to painful joints at least 2 x day, ideally 3-4 x day. This medication works best when used consistently. Monitor for benefit over the next 1-2 weeks.      Potential procedures:   Deferred - we may consider in future, once infection fully resolves and cleared by infectious disease.      Other Orders/Referrals:   None      Follow up with PEE Fernandes CNP in 4 weeks via video visit or in person.     ----------------------------------------------------------------  Clinic " Number:  [phone]   Call with any questions about your care and for scheduling assistance.   Calls are returned Monday through Friday between 8 AM and 4:30 PM. We usually get back to you within 2 business days depending on the issue/request.    If we are prescribing your medications:  For opioid medication refills, call the clinic or send a [employer] message 7 days in advance.  Please include:  Name of requested medication  Name of the pharmacy.  For non-opioid medications, call your pharmacy directly to request a refill. Please allow 3-4 days to be processed.   Per [place] State Law:  All controlled substance prescriptions must be filled within 30 days of being written.    For those controlled substances allowing refills, pickup must occur within 30 days of last fill.      We believe regular attendance is key to your success in our program!    Any time you are unable to keep your appointment we ask that you call us at least 24 hours in advance to cancel.This will allow us to offer the appointment time to another patient.   Multiple missed appointments may lead to dismissal from the clinic.

## 2023-05-25 ENCOUNTER — TELEPHONE (OUTPATIENT)
Dept: PALLIATIVE MEDICINE | Facility: CLINIC | Age: 63
End: 2023-05-25
Payer: MEDICARE

## 2023-05-25 NOTE — TELEPHONE ENCOUNTER
Central Prior Authorization Team - Phone: [phone]     Prior Authorization Approval    Medication: DICLOFENAC SODIUM 1 % EX GEL  Authorization Effective Date: 4/25/2023  Authorization Expiration Date: 5/24/2024  Approved Dose/Quantity: 350  Reference #:     Insurance Company: [employer] - Phone [phone] Fax [phone]  Expected CoPay:       CoPay Card Available:      Financial Assistance Needed:   Which Pharm[facility] is filling the prescription[facility] - [address] [facility]  Pharmacy Notified: Yes  Patient Notified: Yes

## 2023-05-25 NOTE — TELEPHONE ENCOUNTER
PA Initiation : 5/25/2023  Medication: Voltaren 1% gel     Insurance Company: [facility]  Pharmacy Filling the Rx:    [facility] [place] [address] [facility]  Filling Pharmacy Phone: [phone]  Filling Pharmacy Fax: [phone]  Start Date: 5/24/2023

## 2023-05-25 NOTE — TELEPHONE ENCOUNTER
Central Prior Authorization Team - Phone: [phone]     PA Initiation    Medication: DICLOFENAC SODIUM 1 % EX GEL  Insurance Company: [employer] - Phone [phone] Fax [phone]  Pharmacy Filling the Rx: [facility] - [place], [place] - [address] [facility]  Filling Pharmacy Phone: [phone]  Filling Pharmacy Fax:    Start Date: 5/25/2023

## 2023-06-13 ENCOUNTER — OFFICE VISIT (OUTPATIENT)
Dept: NEPHROLOGY | Facility: CLINIC | Age: 63
End: 2023-06-13
Attending: INTERNAL MEDICINE
Payer: COMMERCIAL

## 2023-06-13 ENCOUNTER — ONCOLOGY VISIT (OUTPATIENT)
Dept: TRANSPLANT | Facility: CLINIC | Age: 63
End: 2023-06-13
Attending: INTERNAL MEDICINE
Payer: COMMERCIAL

## 2023-06-13 ENCOUNTER — LAB (OUTPATIENT)
Dept: LAB | Facility: CLINIC | Age: 63
End: 2023-06-13
Attending: INTERNAL MEDICINE
Payer: COMMERCIAL

## 2023-06-13 VITALS
DIASTOLIC BLOOD PRESSURE: 78 MMHG | BODY MASS INDEX: 31.8 KG/M2 | HEART RATE: 59 BPM | WEIGHT: 241 LBS | RESPIRATION RATE: 12 BRPM | OXYGEN SATURATION: 98 % | SYSTOLIC BLOOD PRESSURE: 135 MMHG | TEMPERATURE: 97.9 F

## 2023-06-13 VITALS
BODY MASS INDEX: 31.66 KG/M2 | OXYGEN SATURATION: 96 % | DIASTOLIC BLOOD PRESSURE: 73 MMHG | WEIGHT: 240 LBS | SYSTOLIC BLOOD PRESSURE: 133 MMHG | HEART RATE: 52 BPM

## 2023-06-13 VITALS
HEART RATE: 69 BPM | SYSTOLIC BLOOD PRESSURE: 119 MMHG | WEIGHT: 241.6 LBS | TEMPERATURE: 98.1 F | DIASTOLIC BLOOD PRESSURE: 73 MMHG | OXYGEN SATURATION: 97 % | BODY MASS INDEX: 31.88 KG/M2 | RESPIRATION RATE: 16 BRPM

## 2023-06-13 DIAGNOSIS — E83.52 HYPERCALCEMIA: ICD-10-CM

## 2023-06-13 DIAGNOSIS — N18.2 CKD (CHRONIC KIDNEY DISEASE) STAGE 2, GFR 60-89 ML/MIN: Primary | ICD-10-CM

## 2023-06-13 DIAGNOSIS — R79.9 ELEVATED BUN: ICD-10-CM

## 2023-06-13 DIAGNOSIS — T86.09 GVHD AS COMPLICATION OF BONE MARROW TRANSPLANT (H): ICD-10-CM

## 2023-06-13 DIAGNOSIS — T86.09 GVHD AS COMPLICATION OF BONE MARROW TRANSPLANT (H): Primary | ICD-10-CM

## 2023-06-13 DIAGNOSIS — R79.89 LOW SERUM CORTISOL LEVEL: ICD-10-CM

## 2023-06-13 DIAGNOSIS — Z79.52 ON CORTICOSTEROID THERAPY: ICD-10-CM

## 2023-06-13 DIAGNOSIS — N17.9 AKI (ACUTE KIDNEY INJURY) (H): ICD-10-CM

## 2023-06-13 DIAGNOSIS — D89.813 GVHD AS COMPLICATION OF BONE MARROW TRANSPLANT (H): Primary | ICD-10-CM

## 2023-06-13 DIAGNOSIS — D89.813 GVHD AS COMPLICATION OF BONE MARROW TRANSPLANT (H): ICD-10-CM

## 2023-06-13 LAB
ALBUMIN MFR UR ELPH: 19 MG/DL
ALBUMIN SERPL BCG-MCNC: 4 G/DL (ref 3.5–5.2)
ALBUMIN SERPL BCG-MCNC: 4 G/DL (ref 3.5–5.2)
ALBUMIN UR-MCNC: 10 MG/DL
ALP SERPL-CCNC: 130 U/L (ref 40–129)
ALT SERPL W P-5'-P-CCNC: 26 U/L (ref 0–70)
ANION GAP SERPL CALCULATED.3IONS-SCNC: 8 MMOL/L (ref 7–15)
ANION GAP SERPL CALCULATED.3IONS-SCNC: 9 MMOL/L (ref 7–15)
APPEARANCE UR: CLEAR
AST SERPL W P-5'-P-CCNC: 35 U/L (ref 0–45)
BASOPHILS # BLD AUTO: 0 10E3/UL (ref 0–0.2)
BASOPHILS NFR BLD AUTO: 0 %
BILIRUB SERPL-MCNC: 0.8 MG/DL
BILIRUB UR QL STRIP: NEGATIVE
BUN SERPL-MCNC: 45.7 MG/DL (ref 8–23)
BUN SERPL-MCNC: 46.2 MG/DL (ref 8–23)
CALCIUM SERPL-MCNC: 10.2 MG/DL (ref 8.8–10.2)
CALCIUM SERPL-MCNC: 10.3 MG/DL (ref 8.8–10.2)
CHLORIDE SERPL-SCNC: 102 MMOL/L (ref 98–107)
CHLORIDE SERPL-SCNC: 102 MMOL/L (ref 98–107)
COLOR UR AUTO: YELLOW
CORTIS SERPL-MCNC: 1.4 UG/DL
CREAT SERPL-MCNC: 1.1 MG/DL (ref 0.67–1.17)
CREAT SERPL-MCNC: 1.12 MG/DL (ref 0.67–1.17)
CREAT UR-MCNC: 132 MG/DL
DEPRECATED HCO3 PLAS-SCNC: 27 MMOL/L (ref 22–29)
DEPRECATED HCO3 PLAS-SCNC: 28 MMOL/L (ref 22–29)
EOSINOPHIL # BLD AUTO: 0 10E3/UL (ref 0–0.7)
EOSINOPHIL NFR BLD AUTO: 0 %
ERYTHROCYTE [DISTWIDTH] IN BLOOD BY AUTOMATED COUNT: 17.1 % (ref 10–15)
FERRITIN SERPL-MCNC: 1474 NG/ML (ref 31–409)
GFR SERPL CREATININE-BSD FRML MDRD: 73 ML/MIN/1.73M2
GFR SERPL CREATININE-BSD FRML MDRD: 74 ML/MIN/1.73M2
GLUCOSE SERPL-MCNC: 129 MG/DL (ref 70–99)
GLUCOSE SERPL-MCNC: 129 MG/DL (ref 70–99)
GLUCOSE UR STRIP-MCNC: NEGATIVE MG/DL
HCT VFR BLD AUTO: 34.8 % (ref 40–53)
HGB BLD-MCNC: 11.7 G/DL (ref 13.3–17.7)
HGB UR QL STRIP: NEGATIVE
IMM GRANULOCYTES # BLD: 0 10E3/UL
IMM GRANULOCYTES NFR BLD: 0 %
IRON BINDING CAPACITY (ROCHE): 283 UG/DL (ref 240–430)
IRON SATN MFR SERPL: 23 % (ref 15–46)
IRON SERPL-MCNC: 66 UG/DL (ref 61–157)
KETONES UR STRIP-MCNC: NEGATIVE MG/DL
LEUKOCYTE ESTERASE UR QL STRIP: NEGATIVE
LYMPHOCYTES # BLD AUTO: 2.4 10E3/UL (ref 0.8–5.3)
LYMPHOCYTES NFR BLD AUTO: 24 %
MAGNESIUM SERPL-MCNC: 2.1 MG/DL (ref 1.7–2.3)
MCH RBC QN AUTO: 33.5 PG (ref 26.5–33)
MCHC RBC AUTO-ENTMCNC: 33.6 G/DL (ref 31.5–36.5)
MCV RBC AUTO: 100 FL (ref 78–100)
MONOCYTES # BLD AUTO: 0.4 10E3/UL (ref 0–1.3)
MONOCYTES NFR BLD AUTO: 4 %
MUCOUS THREADS #/AREA URNS LPF: PRESENT /LPF
NEUTROPHILS # BLD AUTO: 7.2 10E3/UL (ref 1.6–8.3)
NEUTROPHILS NFR BLD AUTO: 72 %
NITRATE UR QL: NEGATIVE
NRBC # BLD AUTO: 0 10E3/UL
NRBC BLD AUTO-RTO: 0 /100
PH UR STRIP: 6.5 [PH] (ref 5–7)
PHOSPHATE SERPL-MCNC: 3.1 MG/DL (ref 2.5–4.5)
PLATELET # BLD AUTO: 182 10E3/UL (ref 150–450)
POTASSIUM SERPL-SCNC: 5.1 MMOL/L (ref 3.4–5.3)
POTASSIUM SERPL-SCNC: 5.1 MMOL/L (ref 3.4–5.3)
PROT SERPL-MCNC: 6.6 G/DL (ref 6.4–8.3)
PROT/CREAT 24H UR: 0.14 MG/MG CR (ref 0–0.2)
RBC # BLD AUTO: 3.49 10E6/UL (ref 4.4–5.9)
RBC URINE: <1 /HPF
SODIUM SERPL-SCNC: 138 MMOL/L (ref 136–145)
SODIUM SERPL-SCNC: 138 MMOL/L (ref 136–145)
SP GR UR STRIP: 1.03 (ref 1–1.03)
UROBILINOGEN UR STRIP-MCNC: 2 MG/DL
WBC # BLD AUTO: 10.1 10E3/UL (ref 4–11)
WBC URINE: <1 /HPF

## 2023-06-13 PROCEDURE — 82728 ASSAY OF FERRITIN: CPT

## 2023-06-13 PROCEDURE — 99215 OFFICE O/P EST HI 40 MIN: CPT | Performed by: INTERNAL MEDICINE

## 2023-06-13 PROCEDURE — 83735 ASSAY OF MAGNESIUM: CPT

## 2023-06-13 PROCEDURE — 82784 ASSAY IGA/IGD/IGG/IGM EACH: CPT

## 2023-06-13 PROCEDURE — 85004 AUTOMATED DIFF WBC COUNT: CPT

## 2023-06-13 PROCEDURE — 84156 ASSAY OF PROTEIN URINE: CPT

## 2023-06-13 PROCEDURE — 82306 VITAMIN D 25 HYDROXY: CPT | Performed by: INTERNAL MEDICINE

## 2023-06-13 PROCEDURE — 84100 ASSAY OF PHOSPHORUS: CPT

## 2023-06-13 PROCEDURE — 99211 OFF/OP EST MAY X REQ PHY/QHP: CPT

## 2023-06-13 PROCEDURE — 99213 OFFICE O/P EST LOW 20 MIN: CPT | Mod: 27 | Performed by: INTERNAL MEDICINE

## 2023-06-13 PROCEDURE — 36591 DRAW BLOOD OFF VENOUS DEVICE: CPT

## 2023-06-13 PROCEDURE — 82533 TOTAL CORTISOL: CPT

## 2023-06-13 PROCEDURE — 99215 OFFICE O/P EST HI 40 MIN: CPT

## 2023-06-13 PROCEDURE — 81001 URINALYSIS AUTO W/SCOPE: CPT

## 2023-06-13 PROCEDURE — 83550 IRON BINDING TEST: CPT

## 2023-06-13 PROCEDURE — 250N000011 HC RX IP 250 OP 636: Performed by: INTERNAL MEDICINE

## 2023-06-13 RX ORDER — HEPARIN SODIUM (PORCINE) LOCK FLUSH IV SOLN 100 UNIT/ML 100 UNIT/ML
5 SOLUTION INTRAVENOUS ONCE
Status: COMPLETED | OUTPATIENT
Start: 2023-06-13 | End: 2023-06-13

## 2023-06-13 RX ADMIN — Medication 5 ML: at 14:30

## 2023-06-13 ASSESSMENT — PAIN SCALES - GENERAL
PAINLEVEL: MODERATE PAIN (4)
PAINLEVEL: NO PAIN (0)
PAINLEVEL: MODERATE PAIN (4)

## 2023-06-13 NOTE — NURSING NOTE
"Oncology Rooming Note    June 13, 2023 2:51 PM   Nik Nava is a 63 year old male who presents for:    Chief Complaint   Patient presents with     Oncology Clinic Visit     GVHD     Initial Vitals: /78   Pulse 59   Temp 97.9  F (36.6  C) (Oral)   Resp 12   Wt 109.3 kg (241 lb)   SpO2 98%   BMI 31.80 kg/m   Estimated body mass index is 31.8 kg/m  as calculated from the following:    Height as of 5/12/23: 1.854 m (6' 1\").    Weight as of this encounter: 109.3 kg (241 lb). Body surface area is 2.37 meters squared.  Moderate Pain (4) Comment: Data Unavailable   No LMP for male patient.  Allergies reviewed: Yes  Medications reviewed: Yes    Medications: Medication refills not needed today.  Pharmacy name entered into [employer]:    [facility] - [place], [place] - [address] [facility] [place], [place] - [phone]  [facility]    Clinical concerns:  none      Alba Rahman            "

## 2023-06-13 NOTE — NURSING NOTE
Chief Complaint   Patient presents with     Blood Draw     Port blood draw with heparin flush by lab RN. Vitals taken. Appointment arrived.     Urine sample collected.    Natty Ceballos RN

## 2023-06-13 NOTE — PROGRESS NOTES
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    HPI:    Nik returns to clinic for follow up, he recently completed 8 weeks of IV therapy and has repeat imaging with neurosurgery later this month.  He is currently actively titrating and adjusting his pain regiment with pain as well as PCP locally.  His pain is in the lower back.  In terms of chronic GVHD he feels that symptoms are well controlled.  Uses eyedrops now less than 10 times per day and ocular lenses have made a significant difference in his quality of life.  He notes no new oral sores or ulcers however he does have a persistent lichenoid changes on the right buccal mucosa that may be have slightly increased in appearance since starting the oral pain medicine application to the buccal mucosa.  No new skin rash or thickening.  No respiratory concerns.  No nausea vomiting or diarrhea.  No bleeding no headaches.      Review of Systems                                                                                                                                         10 point Review of systems was negative except as detailed above     PHYSICAL EXAM                                                                                                                                                   KPS: 60-70      Wt Readings from Last 4 Encounters:   05/12/23 108.9 kg (240 lb)   05/02/23 109 kg (240 lb 4.8 oz)   03/28/23 108.9 kg (240 lb)   03/01/23 111 kg (244 lb 11.2 oz)      General: NAD.  HEENT: sclera anicteric, injected conjunctivae. Mild patchy lichenoid changes in oral mucosa with prominent area in the right buccal mucosa slightly increased in area, no ulcers seen.    Lungs:  CTA b/l  no wheezes  CV: RRR  no gallop  Abd; soft, NABS, protuberant  Ext/ Skin: Skin bruising on bilateral forearms, fainting of previous hyperpigmented areas of previously known  cGVHD  LE 1+ bilateral edema in lower extremities; stable  Access: port-a-cath    cGVHD therapy started on February 24 2022  - Baseline NIH scoring 2/24/2022 : skin 2 maculopapular rash/erythema with no sclerotic features, mouth score 1 mild symptoms with lichenoid changes less than 25%, eyes score 2 moderate symptoms without new visual impairments due to KCS requiring lubricant eyedrops more than 3 times a day, overall mild scale for her provider  - 3/25/2022 1 month NIH treatment assessment on PQRST: skin 2, mouth score 1 mild symptoms with NO lichenoid changes, eyes score 2 moderate symptoms w requiring lubricant eyedrops more than 3 times a day, liver score 1 ALT 3.7x ULN and nl bilirubin   - 3/31  Mouth clear; eyes minimal LFT still abn; no joint or new GI sx  - 4/28 Mouth clear, Left>R eye is mild sclera erythema, lids are pink and irritated appearing, LFT's slow improvement, no new joint, skin, or GI symptoms.   -5/11 mouth with mild erythema but no lichenoid changes, eyes using eyedrops 4-6 times a day score of 2, LFTs significantly improved from baseline slight elevation in alk phos and AST with normal bilirubin, skin with hyperpigmentation from old acute GVHD no active skin rashes, no GI symptoms no musculoskeletal complaints.  -5/26 Mouth with very slight lichenoid changes, erythema.  Eyes still using eyedrops 4-6x per day.  LFTs essentially normal with slight elevation in alk phos.  Skin with hyperpigmentation from old acute GVHD, no active rashes, no GI or MSK concerns.  Skin 0, mouth 0 but with lichenoid changes, eyes 2  -6/7/22 Mouth with no lichenoid changes, erythema.  Eyes still using eyedrops 4-6x per day.  LFTs essentially normal with slight elevation in alk phos.  Skin with hyperpigmentation from old acute GVHD, no active rashes, no GI or MSK concerns.  Skin 0, mouth 0, eyes 2     -6 month PQRST assessment 9/6/2022: eyes 3, mouth 0 for symptoms, 25% lichenoid changes (1), liver/GI/skin  0    11/8/2022, 11/22/2022, 12/13/22 cGVHD NIG scoring eyes 3, no other organ involvement clinically  3/28/23 cGVHD NIG scoring eyes 3, no other organ involvement clinically  5/2/23 cGVHD NIG scoring eyes 3, oral 1, no other organ involvement clinically  6/13/23 cGVHD NIG scoring eyes 3, oral 1, no other organ involvement clinically    Lab:    Lab Results   Component Value Date    WBC 10.0 03/28/2023    ANEU 3.5 10/26/2021    HGB 10.7 (L) 03/28/2023    HCT 31.2 (L) 03/28/2023     03/28/2023     03/28/2023    POTASSIUM 4.5 03/28/2023    CHLORIDE 100 03/28/2023    CO2 25 03/28/2023     (H) 03/28/2023    BUN 25.1 (H) 03/28/2023    CR 1.10 03/28/2023    MAG 2.0 05/02/2023    INR 0.90 12/01/2022    BILITOTAL 0.7 03/28/2023    AST 26 03/28/2023    ALT 16 03/28/2023    ALKPHOS 98 03/28/2023    PROTTOTAL 6.8 03/28/2023    ALBUMIN 4.1 03/28/2023 4/28/2023    MRI Lumbar spine  1.  The previously seen ventral epidural abscess has resolved with small amount of residual ventral epidural phlegmon.   2.  Marrow edema about the L5-S1 endplates has mildly worsened within new rim-enhancing subchondral lesion in the left S1 superior endplate. This could reflect progression of osteomyelitis.      MRI hip right  1.  No evidence of septic arthritis of the right hip joint.   2.  There is degeneration and likely tearing of the right acetabular labrum anterosuperiorly, and probably low-grade chondromalacia of the right hip joint.   3.  Abnormality at L5-S1 compatible with known discitis-osteomyelitis.    ASSESSMENT AND PLAN   Nik Nava is a 63 year old male with Ph Pos ALL, day 672 s/p sib allo stem cell transplant    Day -6 (8/4): flu/cy  Day -5 through day -2 (8/5-8/8): flu  Day -1 (8/9): TBI  Day 0 (8/10): transplant     1.  Acute lymphoblastic leukemia, [place] chromosome positive in CR, MRD neg. S/p allo sib PBSCT. (ABO matched)  HCT-CI score: 3 (prior solid tumor)  Day +100 bone marrow biopsy is 100%  donor with no morphologic or flow cytometric evidence of leukemia BCR abl is pending.  - Day +180 restaging BM bx- still in CR  100% donor;  2/16/22 BCR ABL major breakpoint undetectable.   - 1 year restaging 8/18/2022: Markedly hypocellular bone marrow [less than 10% cellular] with maturing trilineage hematopoiesis and no definite morphologic or immunophenotypic evidence for involvement by B lymphoblastic leukemia. BCRABL1 PCR not detected, bone marrow % donor. FISH NORMAL No evidence of BCR-ABL1 fusion  - Maintenance with TKI posttransplantation given his Ph positive ALL that have not been started previously presumed secondary to multiple active issues specifically infections and COVID.  Per Avila Tobar: will reconsider this patient will think about whether this is something he would like to consider he was previously on Dasatinib, we would start on a low dose and increase as tolerated.  This is on hold now pending active GVHD therapy, we discussed on this visit 10/18 in agreement with holding off.     2.  HEME: CBC stable.   3/2023 iron low 28, iron saturation index 10%, transferrin 225, ferritin 1381 down trending (in retrospect that was the time of epidural abscess as well).  B12, folate normal.  High copper and zinc borderline low, 5/2023 started zinc.  Started iron replacement in 4/2023, recheck iron profile on follow up    3.  FEN/Renal: Creatinine 0.97, s/p nephrectormy, nephrology following     4.    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   5/2/23 cGVHD NIG scoring eyes 3, oral 1, no other organ involvement clinically  #Skin GVHD- history of biopsy proven GVHD of the skin 8/30/2021; resolved.   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 WNL today   # Ocular GVHD: follows with ophthalmology. Maxitrol to lids, continue refreshing drops QID. Score 3  Followed closely by Dr. Juarez with significant improvement with scleral lenses  and eyedrops  # Oral GVHD: dex s/s.  Lichenoid changes have largely resolved with no other ulcers since 11/8/2022, except for persistent lichenoid changes to the right buccal mucosa, to avoid applying any medication to that area directly.     Previous therapies  Tacrolimus-previously decided to stay on same dose, no checks of levels if clinically stable, and no need switch to sirolimus. (keep tac low as gvhd is quiet and viremia). 5/10 level 45 with starting of COVID therapy and D-D intractions (Paxlovid for COVID19, which has a drug interaction with tacrolimus-Ritonavir increases serum levels of tacrolimus).   Tacrolimus level subtherapeutic, increase to 2.5 mg twice daily recently, 8/23/2022 instructed to change tacrolimus to 2 mg twice daily. 9/6 with mild NEGRA, hyperkalemia, hypomagnesemia, and reports some tremors and cramps, suspect tacrolimus to be high therefore empirically decreased to 1.5 twice daily, skip morning dose of tacrolimus and took 2 mg today after his afternoon labs. Decrease to 1mg BID on Saturday.  Sirolimus  9/21 despite fluids and a dose adjustment of tacrolimus patient seem to have persistence NORBERTO related NEGRA, therefore after discussion with the patient decision was made to transition to sirolimus that was started on 9/21, patient initially seem to tolerate this well with normalization of kidney function  10/11 presented with flares of ocular and oral GVHD, fluid retention and gain of 5-6 kg, lower extremity edema, abdominal distention, total protein to creatinine ratio elevated at 0.4, in addition to elevation in BUN and creatinine, HTN.  Picture most consistent with sirolimus related side effects.  Less likely that the patient will tolerate even a lower level of sirolimus.  Therefore the patient was instructed to stop sirolimus, last dose on 10/10. 10/14 level 3.5 appropriately trending down.     Corticosteroids  3/1-3/16: prednisone 100mg every day  3/17 75mg every day   3/31 75 alt with  50  4/6: 75 alt with 25mg every other day  4/12 pred tapered to 60 alternating with 25mg   4/15 In setting of viruses trying to reactivate,GVHD stable: Taper 60/15mg alternating.  4/20 decrease pred further to 50/10.    4/25 taper pred to 50/0 every other day as long as symptoms stable.   5/2 Pred decrease 40/0 alternating every other day.   5/13 decrease to 30/0  5/20 decrease to 20/0 then slowly by 5 mg weekly  6/7 decrease to 10/0  Went up to 15/0 with mild increased LFTs  8/23/2022 increased to 20/0, with persistence of oral ulcers and active ocular GVHD.  Discussed with the patient the importance of stopping chewing of nicotine gums for prolonged hours as that would not help with the healing of the oral ulcers.  I discussed with the patient alternatives of nicotine patches that he will reconsider and let me know.  9/6 decreased to 15 alternating with 0  9/13 decreased to 10 alternating with 0  9/21 discontinued tacrolimus given persistent NEGRA and single kidney and start the patient on sirolimus without loading dose.  We will get a list of possible side effects and adverse events from the Pharm.D. see sirolimus section above.  10/11 GVHD flare. Sirolimus stopped. Resume prednisone 40 mg daily as of 10/12, no change with mildly worsening eye pain 10/14  Reduce pred to 35mg 10/25 and 25mg 11/1 11/8 20 mg   11/22 15 mg  12/13/22 10 mg (plan to taper to 5mg then slow taper 1 mg per month given long pred history)  2/23/23 lower from 5 mg to 4 mg for the next month  3/28/2023 - 5/2/2023 continue on 10/4 given adrenal insufficieny with recent am cortisol check and clinical symptoms on taper to lower dose of 4 mg daily    Belumosudil  Patient intolerant/with disease flares on tacrolimus and sirolimus see above.  Discussed with the patient the different options for therapy of chronic GVHD that are FDA approved.  Given the side effect profiles after discussing in detail and given the least nephrotoxicity and expected  response, taking into account other metabolic effect as well.  We will proceed with prior authorization with belumosudil.  Patient was given material to review for possible expected adverse events and side effects with both Belumosodil and ruxolitinib.   --- Started on 10/18 - skin/liver/oral GVHD inactive, and occular symptoms controlled/symptomatic improvement. Will continue.     5.  ID:   # Recent history of Epidural abscess: Followed by Dr. Candelario ID, plan to be on IV ceftriaxone for at least 6 weeks course through 5/11/2023-to be determined on further follow-up.  See imaging with MRI follow-up as above.  3/30/2023 blood culture with Staph epidermidis  3/31/2023 BONE, L5, FLUOROSCOPICALLY-GUIDED NEEDLE BIOPSY: Benign bone with trilineage hematopoiesis (normal) Negative for neoplasm Negative for acute osteomyelitis. DISC, L5-S1, ASPIRATION FOR CYTOLOGY:   Acellular debris; no cellular material present for evaluation     #History of COVID x2 last 1/2023 and influenza    Treated with Paxlovid with persistent fatigue but no active respiratory symptoms  received his influenza annual vaccine as well as COVID boosters locally 11/16/2022     #History of pulmonary infiltrates:   4/20 CT chest with new RUL infiltrate. Highly immunocompromised. 4/22 Fungitell/asp GM were neg. BAL 4/29 NGTD, increased micafungin to 150mg IV daily-- f/u 6/1 Dr Garcia CT with mixed results, followed with Dr. Garcia August 2022 with resolution of pulmonary nodules and normalization of LFTs we will switch patient will switch patient to posaconazole prophylactic dose and monitor LFTs and any infectious concerns closely     #History of CMV viremia:    - CMV viremia up to 1100. 4/15 started valcyte 900mg PO BID. 4/20 CMV <137, 5/10 ND, decreased to 450mg BID 4/30 - continue 4 weeks as long as CMV <137 till 5/28 + weekly CMV  - Switch to acyclovir after completion of current therapy 5/28. 10/11 CMV ND. Check monthly on IST, 11/8 CMV <137, recheck 3/1  ND     - PPx:   ACV  Posaconazole (previously on micafungin with LFts abl, hx of pulmonary nodules needign treatment dose).   Pentamidine, last 11/22  Azithro 250mg daily (stopped levaquin due to possible tendonitis).   IgG1/2023 364, 4/2023 460      - EBV viremia: 4/20 CAP CT (w/ contrast): No adenopathy. S/p 4/22 and 5/4/22 rituximab 375mg/m2 5/10 ND x2, 6/7 ND, 8/18/2022 971, 10/11 ND, check every other month on IST, 11/8 ND, 3/28 843  S/p covid vaccine series 12/2021  S/p evusheld   Deferred annual vaccines in the setting of GVHD and immunosuppression therapy     6. Endo:   - Hx of graves disease; On synthroid follows with endocrine  - HLD: 11/2022 cholesterol 355, triglycerides 153, -- 11/8 started rosuvastatin 5 mg daily, 11/22 CPK within normal, 5/2/2023 repeat lipid profile cholesterol down to 202, triglycerides 191, LDL now normal at 95.  We will continue same dose of rosuvastatin and add fish oil 1g BID. Repeat August with PCP     7. CV:   - Hypertension history   - TTE most recently 10/2022     8. GI:   -Omeprazole for heartburn  -LFTs as above, now normal. Off ursodiol     9. Psych:   - Situational anxiety - lexapro 10mg daily.   - Insomnia: worse on steroids. Ativan, trazadone, melatonin     10. MSK:   -History of left food drop, PT. Occasional muscle cramps discussed optimizing vitamin D levels and considering vitamin D, some of the symptomatology of muscle cramps are likely related to chronic GVHD.  No muscle cramps reported today.  -4/2023 new tearing of the right acetabular labrum anterosuperiorly referred to to orthopedic surgery.       11. HCM/Age appropriate cancer screening:  -Discussed with the patient importance of age-appropriate cancer screening including colonoscopies, he is due for this.  -Discussed importance of at least once a year vitamin D level check, 8/18/2022 wnl  -Screening for secondary iron overload, see heme section above  -Yearly TSH 2022 wnl; yearly lipid panel - see GI  section above  -9/6/2022 DEXA scan Based on BMD diagnosis is consistent with normal bone density based on WHO criteria Ref. 1   -PFTs 9/20/22, see above  -Metabolic other, 8/2022 testosterone within normal  -11/22/2022 discussed with the patient the challenges and the stresses of chronic illness and healthcare fatigue.  Patient had previously been on Lexapro that we restarted confirmed with pharmacy that there are no drug drug interactions.  Additionally we will referred patient to PMNR to help with physical rehab and strength to help with fatigue.  Our social workers will also reach out to Nik and his wife for further resources including support groups for patients with chronic illness and fatigue post transplant.  -Referral to palliative care for symptom and pain management including insomnia     - RTC 8 weeks, 8/15/2023  follow-up locally with infectious disease, neurosurgery and pain management for management of osteomyelitis and pain control  continue same GVHD treatment (on physiologic dose of steroids and good GVHD control overall)    I spent 45 minutes in the care of this patient today, which included time necessary for preparation for the visit, obtaining history, ordering medications/tests/procedures as medically indicated, review of pertinent medical literature, counseling of the patient, communication of recommendations to the care team, and documentation time.

## 2023-06-13 NOTE — LETTER
6/13/2023       RE: Nik Nava  [id number] [facility] [id number]     Dear Colleague,    Thank you for referring your patient, Nik Nava, to the [facility] [place] at [facility]. Please see a copy of my visit note below.      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      Oncologic history: The patient was diagnosed with Ph+ ALL in 2020 after presented with feeling unwell and found to have leukocytosis and blast in his blood. He was treated with Dex and Dasatinib then 2 cycles of vincristine, prednisone, daunorubicin, and pegasparaginase with intrathecal methotrexate. Last ly, he received 1 course of high dose Jaymie-C. He achieved CR with no MRD. Subsequently, he underwent allo sib NMA (flu/cy+TBI) PBSCT on  8/10/21. hematocrit-CI was 3.  The patient was noted to be in CR with BmBx at D100, D180 and 1Y showing 100% donor. He has not been started on TKI due to previous multiple active issues. Post Tx complication included cGVHD at skin, eyes, o liver (all Bx proven, except eyes, clinically) started since 2/22. The current active cGVHDD involved eyes and skin. He was on Tacrolimus for cGVHD but later discontinued due to NEGRA, hyperkalemia hypomagnesemia, tremors and cramps in 9/22. Sirolimus was started in 9/21/22 in replacement of Tac. Cr improved, but the presented on 10/11/22 with ocular and cGVHD flare, fluid retention and gain 5-6 kg. BMT thought this is sirolimus Tox? (of note UPCR was only 0.4 and normal SAlb). Sirolimus was then stopped. Prednisone was started at 40 mg with slow tapering. He was also started on Belumosudil on 10/18/22 (ROCK2  inhibitors:Rho-associated coiled-coil kinase )  Other pertinent Hx:   - He had PE in the setting of receiving PEG asparaginase and was treated with Xarelto which is now completed.   - He had hyperferritinemia which now being followed closely. Not on iron chelators.  - He had COVID -19 in 5/22.  - CMV viremia  - Grave disease  - HTN  Kidney history: He had RCC s/p Rt nephrectomy in 2007.  The patient has baseline creatinine of 0.9-1 pretransplant.  He developed NEGRA in 9/21 with Cr peaked at 1.89 but then come down to baseline. He had NEGRA again in 9/22 with Cr peaked at 1.8, suspect that due to tacrolimus toxicity and it was stopped. Cr improved but now still fluctuates between 1.1-1.3.  The most recent creatinine was on 10/31/2022 at 1.08.  Serum albumin is 3.3. UCPR is 0.39 g/g on 10/18/22. UA on 9/12/21 and 10/18/22 showed no protein, no blood.  However UA on 9/12/2021 showed 8 hyaline cast.      11/1/22: He is doing well except that he still has LE edema. It has improved from when he was on Sirolimus but still quite a bit. Edema has actually started since 2021 but unclear when.But it is certainly getting worse lately when he was treated with high dose steroid and sirolimus.His cGVHD is o/eva all stable but not completely gone. He was just started Belumosudil on 10/18/22 for cGVHD. He just saw BMT today and they are decreasing his steroid.Amlodipine started since 8/21 and possibly related to his swelling. He has multiple SEs from steroid such as weight gain , central obesity, easily bruised, increased appetite and partial insomnia. He has some nocturia. He has no dysuria or hematuria. Recently, he had cat scrath incidence and being treated with doxycycline. He has abdominal distension and gained weight progressively. He drinks 120 Oz of tea per day. We started chlorthalidone and reduced amlodipine.   12/1-12/3/22: Was admitted for dyspnea and malaise concerning for belumosudil intolerance. However, at the same time  he also had flu A positive. He was taken off of amlodipine on 12/6/22. And subsequently chlorthalidone was off in 12/13/22 when Na was 128. Na improved afterwards.   1/10/23:  He feels good today. His BP are now very good 110-130/70 mmHg. He is not on any BP medication ayt this time. He is taking Belumosidil. Symptoms of cGVHD have been improving.  He has some LE edema and easily bruised likely from steroid/belumosudil.   Labs: Creatinine 1.11, potassium 4.8,, dioxide 28, phosphorus 2.3, albumin 4.2, calcium 10.1.  Hemoglobin 11.3, platelet 140. UA pending.   6/13/23: In the interim, he went to the ED on 5/14/23 for testicular pain. CT showed no hernia or mass. Hew was also diagnosed with spine osteomyelitis and Epidural abscess and being treated with Ceftraixone for 6 weeks. Noted stable cGVHD. He also had COVID in 1/23. Now on physiologica dose of steroid. Still has dry eyes and a bit of mouth sore.  He drinks about 3-4 glasses of milk per day. He still has back pain that radiating downt to right leg. He completed ceftriaxone in 5/25/23. Appetite is stable as well as his weight. He has minimal swelling. He is on prednisone 4 mg alternate with 10 mg per day. Labs 6/13/23: Cr 1.10, BUN 46.2, K 5.1, Calcium 10.3, Phos 3.1, Albumin 4.0. Hb 11.7, Platelet 182, WBC 10.1. UA       Past medical history  Past Medical History:   Diagnosis Date    ALL (acute lymphocytic leukemia) (H)     CKD (chronic kidney disease)     GVHD (graft versus host disease) (H)     H/O peripheral stem cell transplant (H)     Hyperthyroidism 1996    Infection due to 2019 novel coronavirus 01/16/2023    Influenza A 11/2022    Postablative hypothyroidism 1997    Pulmonary embolism (H)     Renal cell carcinoma (H) 2007    right kidney    Sleep apnea        Past surgical history  Past Surgical History:   Procedure Laterality Date    BACK SURGERY  2017    BONE MARROW BIOPSY, BONE SPECIMEN, NEEDLE/TROCAR Left 09/02/2021    Procedure: BIOPSY, BONE  MARROW;  Surgeon: Jailyn Ny APRN CNP;  Location: [facility] OR    BONE MARROW BIOPSY, BONE SPECIMEN, NEEDLE/TROCAR Left 11/15/2021    Procedure: BIOPSY, BONE MARROW;  Surgeon: Socrates De La Torre;  Location: [facility] OR    BONE MARROW BIOPSY, BONE SPECIMEN, NEEDLE/TROCAR Left 02/07/2022    Procedure: BIOPSY, BONE MARROW;  Surgeon: Renetta Wiggins PA-C;  Location: [facility] OR    BONE MARROW BIOPSY, BONE SPECIMEN, NEEDLE/TROCAR Left 08/18/2022    Procedure: BIOPSY, BONE MARROW;  Surgeon: Lorrie Yap PA-C;  Location: [facility] OR    BRONCHOSCOPY (RIGID OR FLEXIBLE), DIAGNOSTIC N/A 04/29/2022    Procedure: BRONCHOSCOPY, WITH BRONCHOALVEOLAR LAVAGE;  Surgeon: Murtaza Garcia MD;  Location: [facility] GI    IR CVC TUNNEL PLACEMENT > 5 YRS OF AGE  08/04/2021    IR CVC TUNNEL REMOVAL LEFT  09/02/2021    IR LIVER BIOPSY PERCUTANEOUS  02/21/2022    NEPHRECTOMY  2007    PERCUTANEOUS BIOPSY LIVER N/A 02/21/2022    Procedure: NEEDLE BIOPSY, LIVER, PERCUTANEOUS;  Surgeon: Trace Castellanos MD;  Location: [facility] OR         Review of Systems:   14 systems were reviewed and all negative except as mentioned above.   Current Medications:  Current Outpatient Medications   Medication    acetaminophen (TYLENOL) 500 MG tablet    acyclovir (ZOVIRAX) 800 MG tablet    acyclovir (ZOVIRAX) 800 MG tablet    azithromycin (ZITHROMAX) 250 MG tablet    azithromycin (ZITHROMAX) 250 MG tablet    Belumosudil Mesylate 200 MG TABS    Buprenorphine HCl (BELBUCA) 300 MCG FILM buccal film    Carboxymethylcell-Glycerin PF (REFRESH RELIEVA PF) 0.5-1 % SOLN    carboxymethylcellulose PF (CARBOXYMETHYLCELLULOSE SODIUM) 0.5 % ophthalmic solution    dexamethasone alcohol-free (DECADRON) 0.1 MG/ML solution    diclofenac (VOLTAREN) 1 % topical gel    erythromycin (ROMYCIN) 5 MG/GM ophthalmic ointment    escitalopram (LEXAPRO) 10 MG tablet    ferrous sulfate (FE TABS) 325 (65 Fe) MG EC tablet    fluorometholone (FML LIQUIFILM) 0.1 % ophthalmic suspension     fluorometholone (FML LIQUIFILM) 0.1 % ophthalmic suspension    hydrochlorothiazide (HYDRODIURIL) 12.5 MG tablet    levothyroxine (SYNTHROID/LEVOTHROID) 150 MCG tablet    levothyroxine (SYNTHROID/LEVOTHROID) 150 MCG tablet    LORazepam (ATIVAN) 1 MG tablet    LORazepam (ATIVAN) 1 MG tablet    magnesium oxide (MAG-OX) 400 MG tablet    naloxone (NARCAN) 4 MG/0.1ML nasal spray    nicotine polacrilex (NICORETTE) 4 MG gum    Omega-3 Fish Oil 500 MG capsule    omeprazole (PRILOSEC) 40 MG DR capsule    omeprazole (PRILOSEC) 40 MG DR capsule    ondansetron (ZOFRAN ODT) 8 MG ODT tab    ondansetron (ZOFRAN ODT) 8 MG ODT tab    oxyCODONE (ROXICODONE) 5 MG tablet    oxyCODONE (ROXICODONE) 5 MG tablet    oxyCODONE-acetaminophen (PERCOCET) 5-325 MG tablet    posaconazole (NOXAFIL) 100 MG EC tablet    predniSONE (DELTASONE) 1 MG tablet    predniSONE (DELTASONE) 5 MG tablet    REZUROCK 200 MG TABS    rosuvastatin (CRESTOR) 5 MG tablet    sennosides (SENOKOT) 8.6 MG tablet    sodium chloride 0.9 % neb solution    UNABLE TO FIND    zinc 50 MG TABS     No current facility-administered medications for this visit.       Physical Exam:   There were no vitals taken for this visit.   There is no height or weight on file to calculate BMI.    GENERAL APPEARANCE: Alert, not in acute distress; pleasant.   EYES:  Not pale conjunctiva, pupils equal  HENT: Mouth without ulcers or lesions  PULM: lungs clear to auscultation bilaterally, equal air movement, no clubbing  CV: regular rhythm, normal rate, no rub     -JVD no distended.      -edema: No edema  GI: soft,  - tender, no distended, bowel sounds are present  INTEGUMENT: No rash  NEURO:  Non focal. No asterixis.     Labs:   All labs reviewed by me  Last Renal Panel:  Sodium   Date Value Ref Range Status   03/28/2023 136 136 - 145 mmol/L Final   07/08/2021 139 133 - 144 mmol/L Final     Potassium   Date Value Ref Range Status   03/28/2023 4.5 3.4 - 5.3 mmol/L Final   11/15/2022 5.0 3.4 - 5.3  mmol/L Final   07/08/2021 3.9 3.4 - 5.3 mmol/L Final     Potassium POCT   Date Value Ref Range Status   12/01/2022 4.8 3.4 - 5.3 mmol/L Final     Chloride   Date Value Ref Range Status   03/28/2023 100 98 - 107 mmol/L Final   11/15/2022 101 94 - 109 mmol/L Final   07/08/2021 108 94 - 109 mmol/L Final     Carbon Dioxide   Date Value Ref Range Status   07/08/2021 23 20 - 32 mmol/L Final     Carbon Dioxide (CO2)   Date Value Ref Range Status   03/28/2023 25 22 - 29 mmol/L Final   11/15/2022 30 20 - 32 mmol/L Final     Anion Gap   Date Value Ref Range Status   03/28/2023 11 7 - 15 mmol/L Final   11/15/2022 3 3 - 14 mmol/L Final   07/08/2021 7 3 - 14 mmol/L Final     Glucose   Date Value Ref Range Status   03/28/2023 131 (H) 70 - 99 mg/dL Final   11/15/2022 113 (H) 70 - 99 mg/dL Final   07/08/2021 107 (H) 70 - 99 mg/dL Final     GLUCOSE BY METER POCT   Date Value Ref Range Status   12/03/2022 116 (H) 70 - 99 mg/dL Final     Urea Nitrogen   Date Value Ref Range Status   03/28/2023 25.1 (H) 8.0 - 23.0 mg/dL Final   11/15/2022 51 (H) 7 - 30 mg/dL Final   07/08/2021 24 7 - 30 mg/dL Final     Creatinine   Date Value Ref Range Status   03/28/2023 1.10 0.67 - 1.17 mg/dL Final   07/08/2021 0.94 0.66 - 1.25 mg/dL Final     GFR Estimate   Date Value Ref Range Status   03/28/2023 75 >60 mL/min/1.73m2 Final     Comment:     eGFR calculated using 2021 CKD-EPI equation.   07/08/2021 86 >60 mL/min/[1.73_m2] Final     Comment:     Non  GFR Calc  Starting 12/18/2018, serum creatinine based estimated GFR (eGFR) will be   calculated using the Chronic Kidney Disease Epidemiology Collaboration   (CKD-EPI) equation.       Calcium   Date Value Ref Range Status   03/28/2023 10.0 8.8 - 10.2 mg/dL Final   07/08/2021 8.9 8.5 - 10.1 mg/dL Final     Phosphorus   Date Value Ref Range Status   01/10/2023 2.3 (L) 2.5 - 4.5 mg/dL Final     Albumin   Date Value Ref Range Status   03/28/2023 4.1 3.5 - 5.2 g/dL Final   11/15/2022 3.6 3.4 -  5.0 g/dL Final   07/08/2021 3.7 3.4 - 5.0 g/dL Final       Imaging:  I reviewed imaging studies.     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 However, he had worsening swelling and increased in Cr to 1.39. At that time, it was concerned that he may have sirolimus toxicity. But he only had UCPR 0.39 g/g at that time.  I started low dose chlorthalidone and reduced dose amlodipine and his swelling improved but he developed hypotension so all blood pressure medications were discontinued.  Now his creatinine is stable at 1.1 with a EGFR in the 70s.  His chemistry is stable and he has no signs of volume overloaded.  At this time, we will continue to watch his kidney functions and blood pressure. Discussed with him and his wife about ways to keep kidney healthy.   - Continue to hold blood pressure medication and monitor blood pressure 2-3 times per week and weight daily  - Stay well hydrated  - Low salt diet; NA 2000 mg per day  # Elevated BUN  Could be a combination of dehydration, on prednisone and high protein diet?. NO signs of GI bleeding.  I recommend the patient to drink more fluid at this time.  # Mild hypercalcemia  Calcium is mildly elevated at 10.3.  The patient is taking calcium vitamin D 1 tabs per day.  This mildly elevated calcium could be related to dehydration in the setting of high calcium intake from new drinking.  I asked him to limit the amount  of water drinking but continue calcium at the same dose for now.  If his calcium persistently elevated then would recommend stopping calcium vitamin D altogether.  I will also check vitamin D today as well.     Follow-up in 6 months with labs    I spent  45 minutes on the date of the encounter doing chart review, history and exam, documentation and further activities as noted above. 20 minutes of this visit is dedicated to direct patient interaction via face to face.     Keegan Chew MD on 06/13/2023              Again, thank you for allowing me to participate in the care of your patient.      Sincerely,    Keegan Chew MD

## 2023-06-13 NOTE — PROGRESS NOTES
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      Oncologic history: The patient was diagnosed with Ph+ ALL in 2020 after presented with feeling unwell and found to have leukocytosis and blast in his blood. He was treated with Dex and Dasatinib then 2 cycles of vincristine, prednisone, daunorubicin, and pegasparaginase with intrathecal methotrexate. Last ly, he received 1 course of high dose Jaymie-C. He achieved CR with no MRD. Subsequently, he underwent allo sib NMA (flu/cy+TBI) PBSCT on  8/10/21. hematocrit-CI was 3.  The patient was noted to be in CR with BmBx at D100, D180 and 1Y showing 100% donor. He has not been started on TKI due to previous multiple active issues. Post Tx complication included cGVHD at skin, eyes, o liver (all Bx proven, except eyes, clinically) started since 2/22. The current active cGVHDD involved eyes and skin. He was on Tacrolimus for cGVHD but later discontinued due to NEGRA, hyperkalemia hypomagnesemia, tremors and cramps in 9/22. Sirolimus was started in 9/21/22 in replacement of Tac. Cr improved, but the presented on 10/11/22 with ocular and cGVHD flare, fluid retention and gain 5-6 kg. BMT thought this is sirolimus Tox? (of note UPCR was only 0.4 and normal SAlb). Sirolimus was then stopped. Prednisone was started at 40 mg with slow tapering. He was also started on Belumosudil on 10/18/22 (ROCK2 inhibitors:Rho-associated coiled-coil kinase )  Other pertinent Hx:   - He had PE in the setting of receiving PEG asparaginase and was treated with Xarelto which is now completed.   - He had hyperferritinemia which now being followed closely. Not on iron chelators.  - He had COVID -19 in 5/22.  - CMV viremia  - Grave disease  -  HTN  Kidney history: He had RCC s/p Rt nephrectomy in 2007.  The patient has baseline creatinine of 0.9-1 pretransplant.  He developed NEGRA in 9/21 with Cr peaked at 1.89 but then come down to baseline. He had NEGRA again in 9/22 with Cr peaked at 1.8, suspect that due to tacrolimus toxicity and it was stopped. Cr improved but now still fluctuates between 1.1-1.3.  The most recent creatinine was on 10/31/2022 at 1.08.  Serum albumin is 3.3. UCPR is 0.39 g/g on 10/18/22. UA on 9/12/21 and 10/18/22 showed no protein, no blood.  However UA on 9/12/2021 showed 8 hyaline cast.      11/1/22: He is doing well except that he still has LE edema. It has improved from when he was on Sirolimus but still quite a bit. Edema has actually started since 2021 but unclear when.But it is certainly getting worse lately when he was treated with high dose steroid and sirolimus.His cGVHD is o/eva all stable but not completely gone. He was just started Belumosudil on 10/18/22 for cGVHD. He just saw BMT today and they are decreasing his steroid.Amlodipine started since 8/21 and possibly related to his swelling. He has multiple SEs from steroid such as weight gain , central obesity, easily bruised, increased appetite and partial insomnia. He has some nocturia. He has no dysuria or hematuria. Recently, he had cat scrath incidence and being treated with doxycycline. He has abdominal distension and gained weight progressively. He drinks 120 Oz of tea per day. We started chlorthalidone and reduced amlodipine.   12/1-12/3/22: Was admitted for dyspnea and malaise concerning for belumosudil intolerance. However, at the same time he also had flu A positive. He was taken off of amlodipine on 12/6/22. And subsequently chlorthalidone was off in 12/13/22 when Na was 128. Na improved afterwards.   1/10/23:  He feels good today. His BP are now very good 110-130/70 mmHg. He is not on any BP medication ayt this time. He is taking Belumosidil. Symptoms of cGVHD  have been improving.  He has some LE edema and easily bruised likely from steroid/belumosudil.   Labs: Creatinine 1.11, potassium 4.8,, dioxide 28, phosphorus 2.3, albumin 4.2, calcium 10.1.  Hemoglobin 11.3, platelet 140. UA pending.   6/13/23: In the interim, he went to the ED on 5/14/23 for testicular pain. CT showed no hernia or mass. Hew was also diagnosed with spine osteomyelitis and Epidural abscess and being treated with Ceftraixone for 6 weeks. Noted stable cGVHD. He also had COVID in 1/23. Now on physiologica dose of steroid. Still has dry eyes and a bit of mouth sore.  He drinks about 3-4 glasses of milk per day. He still has back pain that radiating downt to right leg. He completed ceftriaxone in 5/25/23. Appetite is stable as well as his weight. He has minimal swelling. He is on prednisone 4 mg alternate with 10 mg per day. Labs 6/13/23: Cr 1.10, BUN 46.2, K 5.1, Calcium 10.3, Phos 3.1, Albumin 4.0. Hb 11.7, Platelet 182, WBC 10.1. UA       Past medical history  Past Medical History:   Diagnosis Date     ALL (acute lymphocytic leukemia) (H)      CKD (chronic kidney disease)      GVHD (graft versus host disease) (H)      H/O peripheral stem cell transplant (H)      Hyperthyroidism 1996     Infection due to 2019 novel coronavirus 01/16/2023     Influenza A 11/2022     Postablative hypothyroidism 1997     Pulmonary embolism (H)      Renal cell carcinoma (H) 2007    right kidney     Sleep apnea        Past surgical history  Past Surgical History:   Procedure Laterality Date     BACK SURGERY  2017     BONE MARROW BIOPSY, BONE SPECIMEN, NEEDLE/TROCAR Left 09/02/2021    Procedure: BIOPSY, BONE MARROW;  Surgeon: Jailyn Ny APRN CNP;  Location: [facility] OR     BONE MARROW BIOPSY, BONE SPECIMEN, NEEDLE/TROCAR Left 11/15/2021    Procedure: BIOPSY, BONE MARROW;  Surgeon: Socrates De La Torre;  Location: [facility] OR     BONE MARROW BIOPSY, BONE SPECIMEN, NEEDLE/TROCAR Left 02/07/2022    Procedure: BIOPSY, BONE  MARROW;  Surgeon: Renetta Wiggins PA-C;  Location: [facility] OR     BONE MARROW BIOPSY, BONE SPECIMEN, NEEDLE/TROCAR Left 08/18/2022    Procedure: BIOPSY, BONE MARROW;  Surgeon: Lorrie Yap PA-C;  Location: [facility] OR     BRONCHOSCOPY (RIGID OR FLEXIBLE), DIAGNOSTIC N/A 04/29/2022    Procedure: BRONCHOSCOPY, WITH BRONCHOALVEOLAR LAVAGE;  Surgeon: Murtaza Garcia MD;  Location: [facility]     IR CVC TUNNEL PLACEMENT > 5 YRS OF AGE  08/04/2021     IR CVC TUNNEL REMOVAL LEFT  09/02/2021     IR LIVER BIOPSY PERCUTANEOUS  02/21/2022     NEPHRECTOMY  2007     PERCUTANEOUS BIOPSY LIVER N/A 02/21/2022    Procedure: NEEDLE BIOPSY, LIVER, PERCUTANEOUS;  Surgeon: Trace Castellanos MD;  Location: [facility] OR         Review of Systems:   14 systems were reviewed and all negative except as mentioned above.   Current Medications:  Current Outpatient Medications   Medication     acetaminophen (TYLENOL) 500 MG tablet     acyclovir (ZOVIRAX) 800 MG tablet     acyclovir (ZOVIRAX) 800 MG tablet     azithromycin (ZITHROMAX) 250 MG tablet     azithromycin (ZITHROMAX) 250 MG tablet     Belumosudil Mesylate 200 MG TABS     Buprenorphine HCl (BELBUCA) 300 MCG FILM buccal film     Carboxymethylcell-Glycerin PF (REFRESH RELIEVA PF) 0.5-1 % SOLN     carboxymethylcellulose PF (CARBOXYMETHYLCELLULOSE SODIUM) 0.5 % ophthalmic solution     dexamethasone alcohol-free (DECADRON) 0.1 MG/ML solution     diclofenac (VOLTAREN) 1 % topical gel     erythromycin (ROMYCIN) 5 MG/GM ophthalmic ointment     escitalopram (LEXAPRO) 10 MG tablet     ferrous sulfate (FE TABS) 325 (65 Fe) MG EC tablet     fluorometholone (FML LIQUIFILM) 0.1 % ophthalmic suspension     fluorometholone (FML LIQUIFILM) 0.1 % ophthalmic suspension     hydrochlorothiazide (HYDRODIURIL) 12.5 MG tablet     levothyroxine (SYNTHROID/LEVOTHROID) 150 MCG tablet     levothyroxine (SYNTHROID/LEVOTHROID) 150 MCG tablet     LORazepam (ATIVAN) 1 MG tablet     LORazepam (ATIVAN) 1 MG tablet      magnesium oxide (MAG-OX) 400 MG tablet     naloxone (NARCAN) 4 MG/0.1ML nasal spray     nicotine polacrilex (NICORETTE) 4 MG gum     Omega-3 Fish Oil 500 MG capsule     omeprazole (PRILOSEC) 40 MG DR capsule     omeprazole (PRILOSEC) 40 MG DR capsule     ondansetron (ZOFRAN ODT) 8 MG ODT tab     ondansetron (ZOFRAN ODT) 8 MG ODT tab     oxyCODONE (ROXICODONE) 5 MG tablet     oxyCODONE (ROXICODONE) 5 MG tablet     oxyCODONE-acetaminophen (PERCOCET) 5-325 MG tablet     posaconazole (NOXAFIL) 100 MG EC tablet     predniSONE (DELTASONE) 1 MG tablet     predniSONE (DELTASONE) 5 MG tablet     REZUROCK 200 MG TABS     rosuvastatin (CRESTOR) 5 MG tablet     sennosides (SENOKOT) 8.6 MG tablet     sodium chloride 0.9 % neb solution     UNABLE TO FIND     zinc 50 MG TABS     No current facility-administered medications for this visit.       Physical Exam:   There were no vitals taken for this visit.   There is no height or weight on file to calculate BMI.    GENERAL APPEARANCE: Alert, not in acute distress; pleasant.   EYES:  Not pale conjunctiva, pupils equal  HENT: Mouth without ulcers or lesions  PULM: lungs clear to auscultation bilaterally, equal air movement, no clubbing  CV: regular rhythm, normal rate, no rub     -JVD no distended.      -edema: No edema  GI: soft,  - tender, no distended, bowel sounds are present  INTEGUMENT: No rash  NEURO:  Non focal. No asterixis.     Labs:   All labs reviewed by me  Last Renal Panel:  Sodium   Date Value Ref Range Status   03/28/2023 136 136 - 145 mmol/L Final   07/08/2021 139 133 - 144 mmol/L Final     Potassium   Date Value Ref Range Status   03/28/2023 4.5 3.4 - 5.3 mmol/L Final   11/15/2022 5.0 3.4 - 5.3 mmol/L Final   07/08/2021 3.9 3.4 - 5.3 mmol/L Final     Potassium POCT   Date Value Ref Range Status   12/01/2022 4.8 3.4 - 5.3 mmol/L Final     Chloride   Date Value Ref Range Status   03/28/2023 100 98 - 107 mmol/L Final   11/15/2022 101 94 - 109 mmol/L Final   07/08/2021  108 94 - 109 mmol/L Final     Carbon Dioxide   Date Value Ref Range Status   07/08/2021 23 20 - 32 mmol/L Final     Carbon Dioxide (CO2)   Date Value Ref Range Status   03/28/2023 25 22 - 29 mmol/L Final   11/15/2022 30 20 - 32 mmol/L Final     Anion Gap   Date Value Ref Range Status   03/28/2023 11 7 - 15 mmol/L Final   11/15/2022 3 3 - 14 mmol/L Final   07/08/2021 7 3 - 14 mmol/L Final     Glucose   Date Value Ref Range Status   03/28/2023 131 (H) 70 - 99 mg/dL Final   11/15/2022 113 (H) 70 - 99 mg/dL Final   07/08/2021 107 (H) 70 - 99 mg/dL Final     GLUCOSE BY METER POCT   Date Value Ref Range Status   12/03/2022 116 (H) 70 - 99 mg/dL Final     Urea Nitrogen   Date Value Ref Range Status   03/28/2023 25.1 (H) 8.0 - 23.0 mg/dL Final   11/15/2022 51 (H) 7 - 30 mg/dL Final   07/08/2021 24 7 - 30 mg/dL Final     Creatinine   Date Value Ref Range Status   03/28/2023 1.10 0.67 - 1.17 mg/dL Final   07/08/2021 0.94 0.66 - 1.25 mg/dL Final     GFR Estimate   Date Value Ref Range Status   03/28/2023 75 >60 mL/min/1.73m2 Final     Comment:     eGFR calculated using 2021 CKD-EPI equation.   07/08/2021 86 >60 mL/min/[1.73_m2] Final     Comment:     Non  GFR Calc  Starting 12/18/2018, serum creatinine based estimated GFR (eGFR) will be   calculated using the Chronic Kidney Disease Epidemiology Collaboration   (CKD-EPI) equation.       Calcium   Date Value Ref Range Status   03/28/2023 10.0 8.8 - 10.2 mg/dL Final   07/08/2021 8.9 8.5 - 10.1 mg/dL Final     Phosphorus   Date Value Ref Range Status   01/10/2023 2.3 (L) 2.5 - 4.5 mg/dL Final     Albumin   Date Value Ref Range Status   03/28/2023 4.1 3.5 - 5.2 g/dL Final   11/15/2022 3.6 3.4 - 5.0 g/dL Final   07/08/2021 3.7 3.4 - 5.0 g/dL Final       Imaging:  I reviewed imaging studies.     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 However, he had worsening swelling and increased in Cr to 1.39. At that time, it was concerned that he may have sirolimus toxicity. But he only had UCPR 0.39 g/g at that time.  I started low dose chlorthalidone and reduced dose amlodipine and his swelling improved but he developed hypotension so all blood pressure medications were discontinued.  Now his creatinine is stable at 1.1 with a EGFR in the 70s.  His chemistry is stable and he has no signs of volume overloaded.  At this time, we will continue to watch his kidney functions and blood pressure. Discussed with him and his wife about ways to keep kidney healthy.   - Continue to hold blood pressure medication and monitor blood pressure 2-3 times per week and weight daily  - Stay well hydrated  - Low salt diet; NA 2000 mg per day  # Elevated BUN  Could be a combination of dehydration, on prednisone and high protein diet?. NO signs of GI bleeding.  I recommend the patient to drink more fluid at this time.  # Mild hypercalcemia  Calcium is mildly elevated at 10.3.  The patient is taking calcium vitamin D 1 tabs per day.  This mildly elevated calcium could be related to dehydration in the setting of high calcium intake from new drinking.  I asked him to limit the amount of water drinking but continue calcium at the same dose for now.  If his calcium persistently elevated then would recommend stopping calcium vitamin D altogether.  I will also check vitamin D today as well.     Follow-up in 6 months with labs    I spent  45 minutes on the  date of the encounter doing chart review, history and exam, documentation and further activities as noted above. 20 minutes of this visit is dedicated to direct patient interaction via face to face.     Keegan Chew MD on 06/13/2023

## 2023-06-13 NOTE — LETTER
6/13/2023         RE: Nik Nava  [address] [facility] [id number]        Dear Colleague,    Thank you for referring your patient, Nik Nava, to the [facility] [place]. Please see a copy of my visit note below.        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    HPI:    Nik returns to clinic for follow up, he recently completed 8 weeks of IV therapy and has repeat imaging with neurosurgery later this month.  He is currently actively titrating and adjusting his pain regiment with pain as well as PCP locally.  His pain is in the lower back.  In terms of chronic GVHD he feels that symptoms are well controlled.  Uses eyedrops now less than 10 times per day and ocular lenses have made a significant difference in his quality of life.  He notes no new oral sores or ulcers however he does have a persistent lichenoid changes on the right buccal mucosa that may be have slightly increased in appearance since starting the oral pain medicine application to the buccal mucosa.  No new skin rash or thickening.  No respiratory concerns.  No nausea vomiting or diarrhea.  No bleeding no headaches.      Review of Systems                                                                                                                                         10 point Review of systems was negative except as detailed above     PHYSICAL EXAM                                                                                                                                                   KPS: 60-70      Wt Readings from Last 4 Encounters:   05/12/23 108.9 kg (240 lb)   05/02/23 109 kg (240 lb 4.8 oz)   03/28/23 108.9 kg (240 lb)   03/01/23 111 kg (244 lb 11.2 oz)      General: NAD.  HEENT: sclera anicteric, injected conjunctivae. Mild patchy lichenoid changes in oral mucosa  with prominent area in the right buccal mucosa slightly increased in area, no ulcers seen.    Lungs:  CTA b/l  no wheezes  CV: RRR  no gallop  Abd; soft, NABS, protuberant  Ext/ Skin: Skin bruising on bilateral forearms, fainting of previous hyperpigmented areas of previously known cGVHD  LE 1+ bilateral edema in lower extremities; stable  Access: port-a-cath    cGVHD therapy started on February 24 2022  - Baseline NIH scoring 2/24/2022 : skin 2 maculopapular rash/erythema with no sclerotic features, mouth score 1 mild symptoms with lichenoid changes less than 25%, eyes score 2 moderate symptoms without new visual impairments due to KCS requiring lubricant eyedrops more than 3 times a day, overall mild scale for her provider  - 3/25/2022 1 month NIH treatment assessment on PQRST: skin 2, mouth score 1 mild symptoms with NO lichenoid changes, eyes score 2 moderate symptoms w requiring lubricant eyedrops more than 3 times a day, liver score 1 ALT 3.7x ULN and nl bilirubin   - 3/31  Mouth clear; eyes minimal LFT still abn; no joint or new GI sx  - 4/28 Mouth clear, Left>R eye is mild sclera erythema, lids are pink and irritated appearing, LFT's slow improvement, no new joint, skin, or GI symptoms.   -5/11 mouth with mild erythema but no lichenoid changes, eyes using eyedrops 4-6 times a day score of 2, LFTs significantly improved from baseline slight elevation in alk phos and AST with normal bilirubin, skin with hyperpigmentation from old acute GVHD no active skin rashes, no GI symptoms no musculoskeletal complaints.  -5/26 Mouth with very slight lichenoid changes, erythema.  Eyes still using eyedrops 4-6x per day.  LFTs essentially normal with slight elevation in alk phos.  Skin with hyperpigmentation from old acute GVHD, no active rashes, no GI or MSK concerns.  Skin 0, mouth 0 but with lichenoid changes, eyes 2  -6/7/22 Mouth with no lichenoid changes, erythema.  Eyes still using eyedrops 4-6x per day.  LFTs  essentially normal with slight elevation in alk phos.  Skin with hyperpigmentation from old acute GVHD, no active rashes, no GI or MSK concerns.  Skin 0, mouth 0, eyes 2     -6 month PQRST assessment 9/6/2022: eyes 3, mouth 0 for symptoms, 25% lichenoid changes (1), liver/GI/skin 0    11/8/2022, 11/22/2022, 12/13/22 cGVHD NIG scoring eyes 3, no other organ involvement clinically  3/28/23 cGVHD NIG scoring eyes 3, no other organ involvement clinically  5/2/23 cGVHD NIG scoring eyes 3, oral 1, no other organ involvement clinically  6/13/23 cGVHD NIG scoring eyes 3, oral 1, no other organ involvement clinically    Lab:    Lab Results   Component Value Date    WBC 10.0 03/28/2023    ANEU 3.5 10/26/2021    HGB 10.7 (L) 03/28/2023    HCT 31.2 (L) 03/28/2023     03/28/2023     03/28/2023    POTASSIUM 4.5 03/28/2023    CHLORIDE 100 03/28/2023    CO2 25 03/28/2023     (H) 03/28/2023    BUN 25.1 (H) 03/28/2023    CR 1.10 03/28/2023    MAG 2.0 05/02/2023    INR 0.90 12/01/2022    BILITOTAL 0.7 03/28/2023    AST 26 03/28/2023    ALT 16 03/28/2023    ALKPHOS 98 03/28/2023    PROTTOTAL 6.8 03/28/2023    ALBUMIN 4.1 03/28/2023 4/28/2023    MRI Lumbar spine  1.  The previously seen ventral epidural abscess has resolved with small amount of residual ventral epidural phlegmon.   2.  Marrow edema about the L5-S1 endplates has mildly worsened within new rim-enhancing subchondral lesion in the left S1 superior endplate. This could reflect progression of osteomyelitis.      MRI hip right  1.  No evidence of septic arthritis of the right hip joint.   2.  There is degeneration and likely tearing of the right acetabular labrum anterosuperiorly, and probably low-grade chondromalacia of the right hip joint.   3.  Abnormality at L5-S1 compatible with known discitis-osteomyelitis.    ASSESSMENT AND PLAN   Nik Nava is a 63 year old male with Ph Pos ALL, day 672 s/p sib allo stem cell transplant    Day -6 (8/4):  flu/cy  Day -5 through day -2 (8/5-8/8): flu  Day -1 (8/9): TBI  Day 0 (8/10): transplant     1.  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 S/p allo sib PBSCT. (ABO matched)  HCT-CI score: 3 (prior solid tumor)  Day +100 bone marrow biopsy is 100% donor with no morphologic or flow cytometric evidence of leukemia BCR abl is pending.  - Day +180 restaging BM bx- still in CR  100% donor;  2/16/22 BCR ABL major breakpoint undetectable.   - 1 year restaging 8/18/2022: Markedly hypocellular bone marrow [less than 10% cellular] with maturing trilineage hematopoiesis and no definite morphologic or immunophenotypic evidence for involvement by B lymphoblastic leukemia. BCRABL1 PCR not detected, bone marrow % donor. FISH NORMAL No evidence of BCR-ABL1 fusion  - Maintenance with TKI posttransplantation given his Ph positive ALL that have not been started previously presumed secondary to multiple active issues specifically infections and COVID.  Per Avila Tobar: will reconsider this patient will think about whether this is something he would like to consider he was previously on Dasatinib, we would start on a low dose and increase as tolerated.  This is on hold now pending active GVHD therapy, we discussed on this visit 10/18 in agreement with holding off.     2.  HEME: CBC stable.   3/2023 iron low 28, iron saturation index 10%, transferrin 225, ferritin 1381 down trending (in retrospect that was the time of epidural abscess as well).  B12, folate normal.  High copper and zinc borderline low, 5/2023 started zinc.  Started iron replacement in 4/2023, recheck iron profile on follow up    3.  FEN/Renal: Creatinine 0.97, s/p nephrectormy, nephrology following     4.    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   5/2/23 cGVHD NIG scoring eyes 3, oral 1, no other organ involvement clinically  #Skin GVHD- history of biopsy proven GVHD of the skin 8/30/2021;  resolved.   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 WNL today   # Ocular GVHD: follows with ophthalmology. Maxitrol to lids, continue refreshing drops QID. Score 3  Followed closely by Dr. Juarez with significant improvement with scleral lenses and eyedrops  # Oral GVHD: dex s/s.  Lichenoid changes have largely resolved with no other ulcers since 11/8/2022, except for persistent lichenoid changes to the right buccal mucosa, to avoid applying any medication to that area directly.     Previous therapies  Tacrolimus-previously decided to stay on same dose, no checks of levels if clinically stable, and no need switch to sirolimus. (keep tac low as gvhd is quiet and viremia). 5/10 level 45 with starting of COVID therapy and D-D intractions (Paxlovid for COVID19, which has a drug interaction with tacrolimus-Ritonavir increases serum levels of tacrolimus).   Tacrolimus level subtherapeutic, increase to 2.5 mg twice daily recently, 8/23/2022 instructed to change tacrolimus to 2 mg twice daily. 9/6 with mild NEGRA, hyperkalemia, hypomagnesemia, and reports some tremors and cramps, suspect tacrolimus to be high therefore empirically decreased to 1.5 twice daily, skip morning dose of tacrolimus and took 2 mg today after his afternoon labs. Decrease to 1mg BID on Saturday.  Sirolimus  9/21 despite fluids and a dose adjustment of tacrolimus patient seem to have persistence NORBERTO related NEGRA, therefore after discussion with the patient decision was made to transition to sirolimus that was started on 9/21, patient initially seem to tolerate this well with normalization of kidney function  10/11 presented with flares of ocular and oral GVHD, fluid retention and gain of 5-6 kg, lower extremity edema, abdominal distention, total protein to creatinine ratio elevated at 0.4, in addition to elevation in BUN and creatinine, HTN.  Picture most consistent with sirolimus related side effects.   Less likely that the patient will tolerate even a lower level of sirolimus.  Therefore the patient was instructed to stop sirolimus, last dose on 10/10. 10/14 level 3.5 appropriately trending down.     Corticosteroids  3/1-3/16: prednisone 100mg every day  3/17 75mg every day   3/31 75 alt with 50  4/6: 75 alt with 25mg every other day  4/12 pred tapered to 60 alternating with 25mg   4/15 In setting of viruses trying to reactivate,GVHD stable: Taper 60/15mg alternating.  4/20 decrease pred further to 50/10.    4/25 taper pred to 50/0 every other day as long as symptoms stable.   5/2 Pred decrease 40/0 alternating every other day.   5/13 decrease to 30/0  5/20 decrease to 20/0 then slowly by 5 mg weekly  6/7 decrease to 10/0  Went up to 15/0 with mild increased LFTs  8/23/2022 increased to 20/0, with persistence of oral ulcers and active ocular GVHD.  Discussed with the patient the importance of stopping chewing of nicotine gums for prolonged hours as that would not help with the healing of the oral ulcers.  I discussed with the patient alternatives of nicotine patches that he will reconsider and let me know.  9/6 decreased to 15 alternating with 0  9/13 decreased to 10 alternating with 0  9/21 discontinued tacrolimus given persistent NEGRA and single kidney and start the patient on sirolimus without loading dose.  We will get a list of possible side effects and adverse events from the Pharm.D. see sirolimus section above.  10/11 GVHD flare. Sirolimus stopped. Resume prednisone 40 mg daily as of 10/12, no change with mildly worsening eye pain 10/14  Reduce pred to 35mg 10/25 and 25mg 11/1 11/8 20 mg   11/22 15 mg  12/13/22 10 mg (plan to taper to 5mg then slow taper 1 mg per month given long pred history)  2/23/23 lower from 5 mg to 4 mg for the next month  3/28/2023 - 5/2/2023 continue on 10/4 given adrenal insufficieny with recent am cortisol check and clinical symptoms on taper to lower dose of 4 mg  daily    Belumosudil  Patient intolerant/with disease flares on tacrolimus and sirolimus see above.  Discussed with the patient the different options for therapy of chronic GVHD that are FDA approved.  Given the side effect profiles after discussing in detail and given the least nephrotoxicity and expected response, taking into account other metabolic effect as well.  We will proceed with prior authorization with belumosudil.  Patient was given material to review for possible expected adverse events and side effects with both Belumosodil and ruxolitinib.   --- Started on 10/18 - skin/liver/oral GVHD inactive, and occular symptoms controlled/symptomatic improvement. Will continue.     5.  ID:   # Recent history of Epidural abscess: Followed by Dr. Candelario ID, plan to be on IV ceftriaxone for at least 6 weeks course through 5/11/2023-to be determined on further follow-up.  See imaging with MRI follow-up as above.  3/30/2023 blood culture with Staph epidermidis  3/31/2023 BONE, L5, FLUOROSCOPICALLY-GUIDED NEEDLE BIOPSY: Benign bone with trilineage hematopoiesis (normal) Negative for neoplasm Negative for acute osteomyelitis. DISC, L5-S1, ASPIRATION FOR CYTOLOGY:   Acellular debris; no cellular material present for evaluation     #History of COVID x2 last 1/2023 and influenza    Treated with Paxlovid with persistent fatigue but no active respiratory symptoms  received his influenza annual vaccine as well as COVID boosters locally 11/16/2022     #History of pulmonary infiltrates:   4/20 CT chest with new RUL infiltrate. Highly immunocompromised. 4/22 Fungitell/asp GM were neg. BAL 4/29 NGTD, increased micafungin to 150mg IV daily-- f/u 6/1 Dr Garcia CT with mixed results, followed with Dr. 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 4/15 started valcyte 900mg PO BID. 4/20 CMV <137, 5/10 ND, decreased to 450mg BID 4/30 - continue 4 weeks as long as CMV <137 till 5/28 + weekly CMV  - Switch to acyclovir after completion of current therapy 5/28. 10/11 CMV ND. Check monthly on IST, 11/8 CMV <137, recheck 3/1 ND     - PPx:   ACV  Posaconazole (previously on micafungin with LFts abl, hx of pulmonary nodules needign treatment dose).   Pentamidine, last 11/22  Azithro 250mg daily (stopped levaquin due to possible tendonitis).   IgG1/2023 364, 4/2023 460      - EBV viremia: 4/20 CAP CT (w/ contrast): No adenopathy. S/p 4/22 and 5/4/22 rituximab 375mg/m2 5/10 ND x2, 6/7 ND, 8/18/2022 971, 10/11 ND, check every other month on IST, 11/8 ND, 3/28 843  S/p covid vaccine series 12/2021  S/p evusheld   Deferred annual vaccines in the setting of GVHD and immunosuppression therapy     6. Endo:   - Hx of graves disease; On synthroid follows with endocrine  - HLD: 11/2022 cholesterol 355, triglycerides 153, -- 11/8 started rosuvastatin 5 mg daily, 11/22 CPK within normal, 5/2/2023 repeat lipid profile cholesterol down to 202, triglycerides 191, LDL now normal at 95.  We will continue same dose of rosuvastatin and add fish oil 1g BID. Repeat August with PCP     7. CV:   - Hypertension history   - TTE most recently 10/2022     8. GI:   -Omeprazole for heartburn  -LFTs as above, now normal. Off ursodiol     9. Psych:   - Situational anxiety - lexapro 10mg daily.   - Insomnia: worse on steroids. Ativan, trazadone, melatonin     10. MSK:   -History of left food drop, PT. Occasional muscle cramps discussed optimizing vitamin D levels and considering vitamin D, some of the symptomatology of muscle cramps are likely related to chronic GVHD.  No muscle cramps reported today.  -4/2023 new tearing of the right acetabular labrum anterosuperiorly referred to to orthopedic surgery.       11. HCM/Age appropriate cancer  screening:  -Discussed with the patient importance of age-appropriate cancer screening including colonoscopies, he is due for this.  -Discussed importance of at least once a year vitamin D level check, 8/18/2022 wnl  -Screening for secondary iron overload, see heme section above  -Yearly TSH 2022 wnl; yearly lipid panel - see GI section above  -9/6/2022 DEXA scan Based on BMD diagnosis is consistent with normal bone density based on WHO criteria Ref. 1   -PFTs 9/20/22, see above  -Metabolic other, 8/2022 testosterone within normal  -11/22/2022 discussed with the patient the challenges and the stresses of chronic illness and healthcare fatigue.  Patient had previously been on Lexapro that we restarted confirmed with pharmacy that there are no drug drug interactions.  Additionally we will referred patient to PMNR to help with physical rehab and strength to help with fatigue.  Our social workers will also reach out to Nik and his wife for further resources including support groups for patients with chronic illness and fatigue post transplant.  -Referral to palliative care for symptom and pain management including insomnia     - RTC 8 weeks, 8/15/2023  follow-up locally with infectious disease, neurosurgery and pain management for management of osteomyelitis and pain control  continue same GVHD treatment (on physiologic dose of steroids and good GVHD control overall)    I spent 45 minutes in the care of this patient today, which included time necessary for preparation for the visit, obtaining history, ordering medications/tests/procedures as medically indicated, review of pertinent medical literature, counseling of the patient, communication of recommendations to the care team, and documentation time.        BMT DOM

## 2023-06-13 NOTE — NURSING NOTE
Chief Complaint   Patient presents with     RECHECK     /73 (BP Location: Right arm, Patient Position: Sitting, Cuff Size: Adult Large)   Pulse 52   Wt 108.9 kg (240 lb)   SpO2 96%   BMI 31.66 kg/m

## 2023-06-14 DIAGNOSIS — Z79.52 ON CORTICOSTEROID THERAPY: Primary | ICD-10-CM

## 2023-06-14 LAB
DEPRECATED CALCIDIOL+CALCIFEROL SERPL-MC: 65 UG/L (ref 20–75)
IGG SERPL-MCNC: 525 MG/DL (ref 610–1616)

## 2023-06-15 ENCOUNTER — TELEPHONE (OUTPATIENT)
Dept: PALLIATIVE MEDICINE | Facility: CLINIC | Age: 63
End: 2023-06-15

## 2023-06-15 ENCOUNTER — VIRTUAL VISIT (OUTPATIENT)
Dept: PALLIATIVE MEDICINE | Facility: CLINIC | Age: 63
End: 2023-06-15
Payer: COMMERCIAL

## 2023-06-15 DIAGNOSIS — G06.1 ABSCESS IN EPIDURAL SPACE OF LUMBAR SPINE: ICD-10-CM

## 2023-06-15 DIAGNOSIS — M25.551 HIP PAIN, RIGHT: ICD-10-CM

## 2023-06-15 DIAGNOSIS — D89.813 GVHD AS COMPLICATION OF BONE MARROW TRANSPLANT (H): ICD-10-CM

## 2023-06-15 DIAGNOSIS — M46.26 OSTEOMYELITIS OF LUMBAR SPINE (H): ICD-10-CM

## 2023-06-15 DIAGNOSIS — M54.50 LUMBAR SPINE PAIN: ICD-10-CM

## 2023-06-15 DIAGNOSIS — M54.10 RADICULAR PAIN OF RIGHT LOWER EXTREMITY: ICD-10-CM

## 2023-06-15 DIAGNOSIS — M25.551 RIGHT HIP PAIN: ICD-10-CM

## 2023-06-15 DIAGNOSIS — T86.09 GVHD AS COMPLICATION OF BONE MARROW TRANSPLANT (H): ICD-10-CM

## 2023-06-15 PROCEDURE — 99214 OFFICE O/P EST MOD 30 MIN: CPT | Mod: VID

## 2023-06-15 RX ORDER — OXYCODONE HYDROCHLORIDE 5 MG/1
5-10 TABLET ORAL 2 TIMES DAILY PRN
Qty: 30 TABLET | Refills: 0 | Status: SHIPPED | OUTPATIENT
Start: 2023-06-15 | End: 2023-07-25

## 2023-06-15 ASSESSMENT — PAIN SCALES - GENERAL: PAINLEVEL: MODERATE PAIN (5)

## 2023-06-15 NOTE — PATIENT INSTRUCTIONS
"Pain Physical Therapy:  NO   He is currently working with home PT. May consider pain PT in the future, if needed.   Pain Psychologist to address relaxation, behavioral change, coping style, and other factors important to improvement.  NO  Diagnostic Studies:  Reviewed lumbar MRI in chart - demonstrated multilevel degenerative changes, discitis, epidural space abscess and osteomyelitis.  Medication Management:   Increase Belbuca to 450 mcg twice daily. He reports significant side effects with Oxycontin (notably sedation and \"brain fog\"), but this has improved since transition to Belbuca. He has not appreciated much difference in pain levels with 300 mcg dosage, so we will increase today. Advised to monitor for improvement in baseline pain and intensity of fluctuations. New prescription for 450 mcg #60 films sent into pharmacy today.   Monitor for sedation - may cause dizziness or drowsiness. Be careful driving/moving around until you know effects of medication. Avoid concurrent alcohol use.   Continue oxycodone 5 mg up to twice daily as needed for breakthrough pain. Refilled today for #60 tabs.   Naloxone - Prescription sent into pharmacy at prior visit. Advised to  to have on hand at home in case of accidental opioid overdose.   Controlled substance agreement and UDS completed on 5/12/23.   Apply diclofenac gel to painful joints at least 2 x day, ideally 3-4 x day. This medication works best when used consistently. Monitor for benefit over the next 1-2 weeks.   Potential procedures:   Deferred - we may consider in future, once infection fully resolves and cleared by infectious disease.   Other Orders/Referrals:   None   Follow up with PEE Fernandes CNP in 4 weeks    ----------------------------------------------------------------  Clinic Number:  [phone]   Call with any questions about your care and for scheduling assistance.   Calls are returned Monday through Friday between 8 AM and 4:30 PM. We " usually get back to you within 2 business days depending on the issue/request.    If we are prescribing your medications:  For opioid medication refills, call the clinic or send a VideoJaxt message 7 days in advance.  Please include:  Name of requested medication  Name of the pharmacy.  For non-opioid medications, call your pharmacy directly to request a refill. Please allow 3-4 days to be processed.   Per [place] State Law:  All controlled substance prescriptions must be filled within 30 days of being written.    For those controlled substances allowing refills, pickup must occur within 30 days of last fill.      We believe regular attendance is key to your success in our program!    Any time you are unable to keep your appointment we ask that you call us at least 24 hours in advance to cancel.This will allow us to offer the appointment time to another patient.   Multiple missed appointments may lead to dismissal from the clinic.

## 2023-06-15 NOTE — PROGRESS NOTES
Video-Visit Details    Type of service:  Video Visit     Originating Location (pt. Location): Home    Distant Location (provider location):  On-site  Platform used for Video Visit: Unable to complete video visit - started with [place], converted to telephone.       [facility]     Date of visit: 6/15/2023      Assessment:   Nik Nava is a 63 year old male with a past medical history significant for Grave's disease, GVHD s/p bone marrow txp, generalized muscle weakness, ALL in remission, CKD, renal cell carcinoma,  who presents with complaints of low back and RLE pain.      1. Low back/RLE - Onset of pain occurred early March 2023 after fall on ice, had low back and tailbone pain. He then went to ED on 3/30/23 with worsening pain and was found to have epidural space abscess, discitis, osteomyelitis. He is currently following with ED for extended antibiotic course. Of note, he has hx of ALL (in remission), s/p BMT. Lumbar MRIs on 3/30/23 and 4/28/23, in addition to acute red flag findings, noted multilevel degenerative changes. He continues to have significant low back and RLE pain that extends down lateral aspect of leg into ankle. Denies foot paraesthesias, no leg weakness, no other red flag symptoms. On exam, mild TTP in lumbosacral area, focal tenderness of left lower lumbar, no red flag findings. Etiology of pain is likely multifactorial, including osteomyelitis, epidural abscess, underlying degenerative changes of spine, with overlying myofascial component to an extent.       Visit Diagnoses:  1. Lumbar spine pain    2. Radicular pain of right lower extremity    3. Hip pain, right    4. Osteomyelitis of lumbar spine (H)    5. 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 Abscess in epidural space of lumbar spine    7. Right hip pain        Plan:  Diagnosis reviewed, treatment option addressed, and risk/benifits discussed.  Self-care instructions given.  I am recommending a  "multidisciplinary treatment plan to help this patient better manage their pain.      Pain Physical Therapy:  NO   He is currently working with home PT. May consider pain PT in the future, if needed.      Pain Psychologist to address relaxation, behavioral change, coping style, and other factors important to improvement.  NO     Diagnostic Studies:  Reviewed lumbar MRI in chart - demonstrated multilevel degenerative changes, discitis, epidural space abscess and osteomyelitis.     Medication Management:     Increase Belbuca to 450 mcg twice daily. He reports significant side effects with Oxycontin (notably sedation and \"brain fog\"), but this has improved since transition to Belbuca. He has not appreciated much difference in pain levels with 300 mcg dosage, so we will increase today. Advised to monitor for improvement in baseline pain and intensity of fluctuations. New prescription for 450 mcg #60 films sent into pharmacy today.   ? Monitor for sedation - may cause dizziness or drowsiness. Be careful driving/moving around until you know effects of medication. Avoid concurrent alcohol use.     Continue oxycodone 5 mg up to twice daily as needed for breakthrough pain. Refilled today for #60 tabs.     Naloxone - Prescription sent into pharmacy at prior visit. Advised to  to have on hand at home in case of accidental opioid overdose.     Controlled substance agreement and UDS completed on 5/12/23.     Apply diclofenac gel to painful joints at least 2 x day, ideally 3-4 x day. This medication works best when used consistently. Monitor for benefit over the next 1-2 weeks.      Potential procedures:     Deferred - we may consider in future, once infection fully resolves and cleared by infectious disease.      Other Orders/Referrals:     None      Follow up with PEE Fernandes CNP in 4 weeks      Review of Electronic Chart: Today I have also reviewed available medical information in the patient's medical record at [facility] and Care Everywhere (if available), including relevant provider notes, laboratory work, and imaging.     Akilah Shah, DOMINIC, APRN, AGNP-C  [facility]     -------------------------------------------------    Subjective:    Chief complaint:   Chief Complaint   Patient presents with     Pain     Back and down the right leg       Interval history:  Nik Nava is a 63 year old male last seen on 5/24/23.  They are a patient of mine seen in follow up.     Recommendations/plan at the last visit included:  Pain Physical Therapy:  NO   He is currently working with home PT. May consider pain PT in the future, if needed.      Pain Psychologist to address relaxation, behavioral change, coping style, and other factors important to improvement.  NO     Diagnostic Studies:  Reviewed lumbar MRI in chart - demonstrated multilevel degenerative changes, discitis, epidural space abscess and osteomyelitis.     Medication Management:     Increase Belbuca to 300 mcg twice daily. He reports significant side effects with Oxycontin (notably sedation and \"brain fog\"), but this has improved since transition to Belbuca. He has not appreciated much difference in pain levels with 150 mcg dosage, so we will increase today. Advised to monitor for improvement in baseline pain and intensity of fluctuations.   ? Monitor for sedation - may cause dizziness or drowsiness. Be careful driving/moving around until you know effects of medication. Avoid concurrent alcohol use.   ? As discussed, if you have marked sedation with increasing both morning and evening dosage, try reducing morning dose to 150 mcg and continue 300 mcg at bedtime for 3-4 days, then try increasing as tolerated.     Continue oxycodone 5 mg up to twice daily as needed for breakthrough pain.     Naloxone - Prescription sent into pharmacy at last visit. Advised to  to have on hand at home in case of accidental opioid overdose. " "    Controlled substance agreement and UDS completed on 5/12/23.     Apply diclofenac gel to painful joints at least 2 x day, ideally 3-4 x day. This medication works best when used consistently. Monitor for benefit over the next 1-2 weeks.      Potential procedures:     Deferred - we may consider in future, once infection fully resolves and cleared by infectious disease.      Other Orders/Referrals:     None      Follow up with PEE Fernandes CNP in 4 weeks via video visit or in person.       Since his last visit, Nik Nava reports:  -his pain is about the same as it was at last visit.   -He notes he has been more active recently.   -Belbuca was increased at last visit to 300 mcg, has not appreciated improvement in pain.   -He notes his increased activity is impacting pain levels.   -He uses oxycodone 5 mg 1-2 x day PRN for breakthrough pain.   -He does not like to use oxycodone due to side effects.   -He has upcoming appointment with neurosurgery, states they will be doing another MRI.   -He states   -He has some mouth irritation from buccal film.   -No side effects       Pain Information:   Pain rating: averages 5/10 on a 0-10 scale.      Interval history from last visit on 5/24/23:  -his pain is about the same as it was at last visit.   -He was started on Belbuca 150 mcg BID at last visit.   -He was previously taking Oxycontin BID that caused marked sedation.   -He states things are \"okay\" since starting Belbuca, but he is having some oral irritation.   -He has hx of GVHD in his mouth, no open sores but he is concerned about this possibility.  -He has not appreciated significant benefit for pain with Belbuca 150 mcg BID.   -He has noticed improvement in mental clarity.  -No other side effects aside from mild oral irritation.   -He was given oxycodone 5 mg #20 tabs at last visit for breakthrough pain, reports he has only used 1 tablet of this prescription.   -He uses diclofenac gel on low back, right hip " "and RLE.   -He requests refill for diclofenac gel.   -He finishes long term abx tomorrow.   -He will be following up with Infectious Disease and neurosurgery soon.   -His UDS was positive for tramadol, which he was given prior. Denies tramadol use since last visit.   -We discussed [facility] drug drop boxes for old medications.   -His wife Lamar is present for the visit today.   Pain Information:              Pain rating: averages 2-3/10 on a 0-10 scale.           HPI from initial visit with me on 5/12/23:  Nik Nava is a 63 year old male presents with a chief complaint of low back and right hip pain, .      The pain has been present for 2+ months .    The pain is Extreme Pain (9) in severity.    The pain is described as throbbing in low back and thigh, \"zingers\" down the leg.   The pain is alleviated by meds (oxycontin), rest/lying down.    It is exacerbated by prolonged sitting and walking, driving.    Modalities that have been utilized in the past which were helpful include oxycodone while inpatient.    Things that were not helpful, but tried ,include tramadol while inpatient, .    The patient has never tried pain PT, injections (cannot do right now due to osteomyelitis).  The patient otherwise denies bowel or bladder incontinence, parasthesias, weakness, saddle anesthesia, unintentional weight loss, or fever/chills/sweats.   Nik Nava has not been seen at a pain clinic in the past.  He had inpatient consult while hospitalized 3/30 ([facility])  -He had fall on ice 3/5/23, tailbone pain initially, then that has improved but continues to have low back and leg pain.   -He went to ED and admitted to hospital ([facility]) for osetomyelitis in the low back.   -He had pain team consult while inpatient, was started on oxycodone, tramadol, and hydroxyzine.   -Denies paraesthesias in feet.   -Denies weakness.   -He follows with Inf Dis, still on antibiotics.   -He finds MS contin helpful but he has significant sedation. "   -His wife notes improvement in functioning after starting Oxycontin.   -He has home PT right now, chiropractor in the past.   -Oxycodone 5 mg at bedtime PRN prior to fall/infection  -Daughter Luis Miguel is present for visit, wife Lamar on the phone.           Current Pain Treatments:    1. Medications:                             Tylenol 1000 mg               Belbuca 150 mcg BID              Oxycodone 5 mg BID PRN               Naloxone - sent into pharmacy at prior visit     2. Other therapies:               Inf Disease - currently on antibiotic therapy               Home PT     Current MME: 0-15     Review of [place] Prescription Monitoring Program (): No concern for abuse or misuse of controlled medications based on this report. Reviewed - appears appropriate.      Annual Controlled Substance Agreement/UDS due date: Completed on 5/12/23     Past pain treatments:  Surgery      Medications:  Current Outpatient Medications   Medication Sig Dispense Refill     acyclovir (ZOVIRAX) 800 MG tablet Take 1 tablet (800 mg) by mouth 2 times daily 60 tablet 4     azithromycin (ZITHROMAX) 250 MG tablet Take 1 tablet (250 mg) by mouth daily 30 tablet 3     Belumosudil Mesylate 200 MG TABS Take 200 mg by mouth daily 30 tablet 3     Buprenorphine HCl (BELBUCA) 450 MCG FILM buccal film Place 0.625 Film (281.25 mcg) inside cheek every 12 hours 60 Film 0     Carboxymethylcell-Glycerin PF (REFRESH RELIEVA PF) 0.5-1 % SOLN Apply 1 drop to eye 4 times daily Preservative free. Either PF multidose bottle or PF vials 30 each 11     carboxymethylcellulose PF (CARBOXYMETHYLCELLULOSE SODIUM) 0.5 % ophthalmic solution Place 1 drop into both eyes 4 times daily 70 each 11     dexamethasone alcohol-free (DECADRON) 0.1 MG/ML solution Swish and spit 20 mLs (2 mg) in mouth 4 times daily 1000 mL 3     diclofenac (VOLTAREN) 1 % topical gel Apply 4 g topically 4 times daily 350 g 1     erythromycin (ROMYCIN) 5 MG/GM ophthalmic ointment Place 0.5  inches into both eyes At Bedtime (Patient taking differently: Place into both eyes At Bedtime) 3.5 g 4     escitalopram (LEXAPRO) 10 MG tablet        ferrous sulfate (FE TABS) 325 (65 Fe) MG EC tablet Take 325 mg by mouth       fluorometholone (FML LIQUIFILM) 0.1 % ophthalmic suspension Place 1 Drop into both eyes twice daily for 21 days then stop 5 mL 2     levothyroxine (SYNTHROID/LEVOTHROID) 150 MCG tablet Take 1 tablet by mouth daily       levothyroxine (SYNTHROID/LEVOTHROID) 150 MCG tablet Take 1 tablet (150 mcg) by mouth daily 30 tablet 11     LORazepam (ATIVAN) 1 MG tablet Take 1 tablet (1 mg) by mouth nightly as needed for anxiety or sleep 30 tablet 3     magnesium oxide (MAG-OX) 400 MG tablet Take 1 tablet (400 mg) by mouth 2 times daily 120 tablet 1     naloxone (NARCAN) 4 MG/0.1ML nasal spray Spray 1 spray (4 mg) into one nostril alternating nostrils as needed for opioid reversal every 2-3 minutes until assistance arrives 0.2 mL 0     nicotine polacrilex (NICORETTE) 4 MG gum Take 4 mg by mouth as needed for smoking cessation        Omega-3 Fish Oil 500 MG capsule Take 2 capsules (1,000 mg) by mouth daily 120 capsule 3     omeprazole (PRILOSEC) 40 MG DR capsule Take 1 capsule by mouth daily       omeprazole (PRILOSEC) 40 MG DR capsule Take 1 capsule (40 mg) by mouth daily 30 capsule 3     ondansetron (ZOFRAN ODT) 8 MG ODT tab        oxyCODONE (ROXICODONE) 5 MG tablet Take 1-2 tablets (5-10 mg) by mouth 2 times daily as needed for pain 30 tablet 0     oxyCODONE (ROXICODONE) 5 MG tablet        oxyCODONE-acetaminophen (PERCOCET) 5-325 MG tablet Take 1 tablet by mouth daily       posaconazole (NOXAFIL) 100 MG EC tablet Take 3 tablets (300 mg) by mouth every morning 90 tablet 3     predniSONE (DELTASONE) 1 MG tablet Take 4 tablets (4 mg) by mouth daily Take on even days 120 tablet 1     predniSONE (DELTASONE) 5 MG tablet Take 2 tablets (10 mg) by mouth every other day Take on odd days 60 tablet 3     REZUROCK  200 MG TABS TAKE ONE TABLET BY MOUTH ONCE DAILY 30 tablet 3     rosuvastatin (CRESTOR) 5 MG tablet Take 1 tablet (5 mg) by mouth daily 30 tablet 3     sennosides (SENOKOT) 8.6 MG tablet Take 1 tablet by mouth 3 times daily as needed for constipation 90 tablet 0     sodium chloride 0.9 % neb solution 3 mLs by Other route 2 times daily 300 mL 11     UNABLE TO FIND Take 1,200 mg by mouth daily MEDICATION NAME: Flaxseed Oil       zinc 50 MG TABS Take 100 mg by mouth daily Take 100 mg daily for 6 weeks 82 tablet 0     acetaminophen (TYLENOL) 500 MG tablet  (Patient not taking: Reported on 6/13/2023)       acyclovir (ZOVIRAX) 800 MG tablet Take 800 mg by mouth (Patient not taking: Reported on 6/13/2023)       azithromycin (ZITHROMAX) 250 MG tablet Take 1 tablet by mouth daily (Patient not taking: Reported on 6/13/2023)       fluorometholone (FML LIQUIFILM) 0.1 % ophthalmic suspension Apply 1 drop to eye (Patient not taking: Reported on 6/13/2023)       hydrochlorothiazide (HYDRODIURIL) 12.5 MG tablet Hydrochlorothiazide Oral    daily     inactive (Patient not taking: Reported on 6/13/2023)       LORazepam (ATIVAN) 1 MG tablet Take 1 mg by mouth (Patient not taking: Reported on 6/15/2023)         Medical History: any changes in medical history since they were last seen? No      Objective:    Physical Exam:  There were no vitals taken for this visit.  healthy, alert and no distress  PSYCH: Alert and oriented times 3; coherent speech, normal   rate and volume, able to articulate logical thoughts, able   to abstract reason, no tangential thoughts, no hallucinations   or delusions  Her affect is normal  RESP: No cough, no audible wheezing, able to talk in full sentences  Remainder of exam unable to be completed due to telephone visits    Diagnostic Tests/Imaging/Labs:  None     BILLING TIME DOCUMENTATION:   The total TIME spent on this patient on the date of the encounter/appointment was 34 minutes.      TOTAL TIME includes:    Time spent preparing to see the patient (reviewing records and tests)   Time spent face to face (or over the phone) with the patient   Time spent ordering tests, medications, procedures and referrals   Time spent Referring and communicating with other healthcare professionals   Time spent documenting clinical information in Epic

## 2023-06-15 NOTE — TELEPHONE ENCOUNTER
"[facility] Call Center    Phone Message    May a detailed message be left on voicemail: yes     Reason for Call: Medication Question or concern regarding medication   Prescription Clarification  Name of Medication: Buprenorphine HCl (BELBUCA) 450 MCG FILM buccal film  Prescribing Provider: Akilah Shah   Pharmacy: [facility] [id number] [facility]   What on the order needs clarification? Pharmacy called to ask about dose of this medication as it was prescribed that the \"Pt cut 60% of the film\" Pharmacist stated it is too hard for the Pt to cut it like that and asked if Akilah Shah can send a new prescription for 300 MCG instead of 450. Please call back to advise          Action Taken: Message routed to:  Other: [facility]    Travel Screening: Not Applicable                                                                      "

## 2023-06-15 NOTE — NURSING NOTE
Nik is a 63 year old who is being evaluated via a billable video visit.      How would you like to obtain your AVS? MyChart  If the video visit is dropped, the invitation should be resent by: Send to e-mail at: [email or web address]  Will anyone else be joining your video visit? No        Video-Visit Details    Type of service:  Video Visit     Originating Location (pt. Location): Home    Distant Location (provider location):  On-site  Platform used for Video Visit: RodneyWell

## 2023-06-20 ENCOUNTER — LAB (OUTPATIENT)
Dept: LAB | Facility: CLINIC | Age: 63
End: 2023-06-20
Payer: COMMERCIAL

## 2023-06-20 DIAGNOSIS — D89.813 GVHD AS COMPLICATION OF BONE MARROW TRANSPLANT (H): ICD-10-CM

## 2023-06-20 DIAGNOSIS — T86.09 GVHD AS COMPLICATION OF BONE MARROW TRANSPLANT (H): ICD-10-CM

## 2023-06-20 DIAGNOSIS — Z79.52 ON CORTICOSTEROID THERAPY: ICD-10-CM

## 2023-06-20 LAB
ALBUMIN SERPL BCG-MCNC: 4 G/DL (ref 3.5–5.2)
ALP SERPL-CCNC: 117 U/L (ref 40–129)
ALT SERPL W P-5'-P-CCNC: 19 U/L (ref 0–70)
ANION GAP SERPL CALCULATED.3IONS-SCNC: 12 MMOL/L (ref 7–15)
AST SERPL W P-5'-P-CCNC: 28 U/L (ref 0–45)
BASOPHILS # BLD AUTO: 0 10E3/UL (ref 0–0.2)
BASOPHILS NFR BLD AUTO: 0 %
BILIRUB SERPL-MCNC: 0.7 MG/DL
BUN SERPL-MCNC: 34.5 MG/DL (ref 8–23)
CALCIUM SERPL-MCNC: 9.7 MG/DL (ref 8.8–10.2)
CHLORIDE SERPL-SCNC: 100 MMOL/L (ref 98–107)
CORTIS SERPL-MCNC: 0.5 UG/DL
CREAT SERPL-MCNC: 1.26 MG/DL (ref 0.67–1.17)
DEPRECATED HCO3 PLAS-SCNC: 27 MMOL/L (ref 22–29)
EOSINOPHIL # BLD AUTO: 0.1 10E3/UL (ref 0–0.7)
EOSINOPHIL NFR BLD AUTO: 1 %
ERYTHROCYTE [DISTWIDTH] IN BLOOD BY AUTOMATED COUNT: 17 % (ref 10–15)
GFR SERPL CREATININE-BSD FRML MDRD: 63 ML/MIN/1.73M2
GLUCOSE SERPL-MCNC: 98 MG/DL (ref 70–99)
HCT VFR BLD AUTO: 36.5 % (ref 40–53)
HGB BLD-MCNC: 12.2 G/DL (ref 13.3–17.7)
IMM GRANULOCYTES # BLD: 0 10E3/UL
IMM GRANULOCYTES NFR BLD: 0 %
LYMPHOCYTES # BLD AUTO: 3.2 10E3/UL (ref 0.8–5.3)
LYMPHOCYTES NFR BLD AUTO: 35 %
MCH RBC QN AUTO: 33.5 PG (ref 26.5–33)
MCHC RBC AUTO-ENTMCNC: 33.4 G/DL (ref 31.5–36.5)
MCV RBC AUTO: 100 FL (ref 78–100)
MONOCYTES # BLD AUTO: 0.9 10E3/UL (ref 0–1.3)
MONOCYTES NFR BLD AUTO: 10 %
NEUTROPHILS # BLD AUTO: 5 10E3/UL (ref 1.6–8.3)
NEUTROPHILS NFR BLD AUTO: 54 %
PLATELET # BLD AUTO: 188 10E3/UL (ref 150–450)
POTASSIUM SERPL-SCNC: 4.1 MMOL/L (ref 3.4–5.3)
PROT SERPL-MCNC: 6.4 G/DL (ref 6.4–8.3)
RBC # BLD AUTO: 3.64 10E6/UL (ref 4.4–5.9)
SODIUM SERPL-SCNC: 139 MMOL/L (ref 136–145)
WBC # BLD AUTO: 9.1 10E3/UL (ref 4–11)

## 2023-06-20 PROCEDURE — 80053 COMPREHEN METABOLIC PANEL: CPT

## 2023-06-20 PROCEDURE — 82533 TOTAL CORTISOL: CPT

## 2023-06-20 PROCEDURE — 36415 COLL VENOUS BLD VENIPUNCTURE: CPT

## 2023-06-20 PROCEDURE — 85025 COMPLETE CBC W/AUTO DIFF WBC: CPT

## 2023-06-20 RX ORDER — AZITHROMYCIN 250 MG/1
250 TABLET, FILM COATED ORAL DAILY
Qty: 30 TABLET | Refills: 3 | Status: SHIPPED | OUTPATIENT
Start: 2023-06-20 | End: 2023-08-07

## 2023-06-20 NOTE — PROGRESS NOTES
This is a recent snapshot of the patient's [facility] medical record.  For current drug dose and complete information and questions, call [phone] or In Basket pool, [facility] home infusion ([id number])  CSN Number:  [id number]

## 2023-06-21 DIAGNOSIS — T86.09 GVHD AS COMPLICATION OF BONE MARROW TRANSPLANT (H): Primary | ICD-10-CM

## 2023-06-21 DIAGNOSIS — D89.813 GVHD AS COMPLICATION OF BONE MARROW TRANSPLANT (H): Primary | ICD-10-CM

## 2023-06-21 DIAGNOSIS — C91.01 ACUTE LYMPHOBLASTIC LEUKEMIA (ALL) IN REMISSION (H): ICD-10-CM

## 2023-06-21 PROCEDURE — 94642 AEROSOL INHALATION TREATMENT: CPT | Performed by: INTERNAL MEDICINE

## 2023-06-21 PROCEDURE — 94640 AIRWAY INHALATION TREATMENT: CPT | Performed by: INTERNAL MEDICINE

## 2023-06-21 RX ORDER — ALBUTEROL SULFATE 0.83 MG/ML
2.5 SOLUTION RESPIRATORY (INHALATION) EVERY 4 HOURS PRN
Status: DISCONTINUED | OUTPATIENT
Start: 2023-06-21 | End: 2023-06-21 | Stop reason: HOSPADM

## 2023-06-21 RX ORDER — ALBUTEROL SULFATE 0.83 MG/ML
2.5 SOLUTION RESPIRATORY (INHALATION) EVERY 4 HOURS PRN
Status: CANCELLED | OUTPATIENT
Start: 2023-06-21

## 2023-06-21 RX ORDER — PENTAMIDINE ISETHIONATE 300 MG/300MG
300 INHALANT RESPIRATORY (INHALATION)
Status: CANCELLED | OUTPATIENT
Start: 2023-06-21

## 2023-06-21 RX ORDER — PENTAMIDINE ISETHIONATE 300 MG/300MG
300 INHALANT RESPIRATORY (INHALATION)
Status: DISCONTINUED | OUTPATIENT
Start: 2023-06-21 | End: 2023-06-21 | Stop reason: HOSPADM

## 2023-06-21 RX ADMIN — PENTAMIDINE ISETHIONATE 300 MG: 300 INHALANT RESPIRATORY (INHALATION) at 09:59

## 2023-06-21 NOTE — PROGRESS NOTES
Patient was seen today for a Pentamidine nebulizer tx ordered by Dr. Avila Tobar.    Patient was first given 4 puffs of albuterol via spacer , after which Pentamidine 300 mg (Lot # [id number]) mixed with 6cc Sterile H20 was administered through a filtered nebulizer.    Pre-treatment: SpO2 =96%   HR = 89  BBS = Clear  Post-treatment: SpO2 = 98%  HR = 78  BBS = Clear    No adverse side effects noted by the patient.    This service today was provided under the direct supervision of Dr. Carr, who was available if needed.     Procedure was completed by Aisha Monson. JUNIOR

## 2023-06-26 DIAGNOSIS — T86.09 GVHD AS COMPLICATION OF BONE MARROW TRANSPLANT (H): ICD-10-CM

## 2023-06-26 DIAGNOSIS — H04.129 DRY EYE: ICD-10-CM

## 2023-06-26 DIAGNOSIS — C91.01 ACUTE LYMPHOBLASTIC LEUKEMIA (ALL) IN REMISSION (H): ICD-10-CM

## 2023-06-26 DIAGNOSIS — D89.813 GVHD AS COMPLICATION OF BONE MARROW TRANSPLANT (H): ICD-10-CM

## 2023-06-26 RX ORDER — DEXAMETHASONE 0.5 MG/5ML
2 SOLUTION ORAL 4 TIMES DAILY
Qty: 1000 ML | Refills: 3 | Status: SHIPPED | OUTPATIENT
Start: 2023-06-26 | End: 2023-12-21

## 2023-06-27 ENCOUNTER — TELEPHONE (OUTPATIENT)
Dept: PALLIATIVE MEDICINE | Facility: CLINIC | Age: 63
End: 2023-06-27
Payer: MEDICARE

## 2023-06-27 NOTE — TELEPHONE ENCOUNTER
[facility] Call Center    Phone Message    May a detailed message be left on voicemail: yes     Reason for Call: Medication Question or concern regarding medication   Prescription Clarification  Name of Medication: oxyCODONE (ROXICODONE) 5 MG tablet  Buprenorphine HCl (BELBUCA) 450 MCG FILM buccal film  Prescribing Provider: Akilah Shah   Pharmacy: [facility] [id number] [facility]   What on the order needs clarification?   Patient is calling to let us know that he just met with his Neurosurgeon who advised the patient to stop these two medications. The patient is wanting a call back to discuss how to taper off of these medications.           Action Taken: Message routed to:  [facility]    Travel Screening: Not Applicable

## 2023-06-27 NOTE — TELEPHONE ENCOUNTER
sodium chloride 0.9 % neb solution    Last Written Prescription Date:  12-1-2022  Last Fill Quantity: 300 ml,   # refills:  11  Last Office Visit : 5-3-2023  Future Office visit:  Not on file

## 2023-06-28 RX ORDER — SODIUM CHLORIDE FOR INHALATION 0.9 %
3 VIAL, NEBULIZER (ML) INHALATION 2 TIMES DAILY
Qty: 300 ML | OUTPATIENT
Start: 2023-06-28

## 2023-06-28 RX ORDER — SODIUM CHLORIDE FOR INHALATION 0.9 %
3 VIAL, NEBULIZER (ML) INHALATION 2 TIMES DAILY
Qty: 300 ML | Refills: 11 | Status: SHIPPED | OUTPATIENT
Start: 2023-06-28

## 2023-06-28 NOTE — TELEPHONE ENCOUNTER
[facility] Call Center    Phone Message    May a detailed message be left on voicemail: yes     Reason for Call: Other: Patient is calling requesting a call back to discuss tapering medications. Please call patient back.      Action Taken: Message routed to:  Other: [facility]    Travel Screening: Not Applicable

## 2023-06-29 NOTE — TELEPHONE ENCOUNTER
"Patient requesting intructions on tapering off:  oxyCODONE (ROXICODONE) 5 MG tablet  Buprenorphine HCl (BELBUCA) 450 MCG FILM buccal film    \"Patient is calling to let us know that he just met with his Neurosurgeon who advised the patient to stop these two medications. The patient is wanting a call back to discuss how to taper off of these medications.  \"    Routing to provider to  advise.    Natty Tao RN  [facility]  [phone]        "

## 2023-06-30 NOTE — TELEPHONE ENCOUNTER
Chart reviewed - I recommend he follow up with me to discuss a plan. Additionally, I have questions regarding neurosurgery's recommendation to taper of Belbuca. Please have him schedule follow up at my next available, video visit is okay.     Akilah Shah DNP, APRN, AGNP-C  [facility]

## 2023-07-03 NOTE — TELEPHONE ENCOUNTER
ELISEM requesting return call to clinic.    Natty Tao, ENRIQUE  [facility]  [phone]

## 2023-07-03 NOTE — TELEPHONE ENCOUNTER
"Spoke with patient who states that neurosurgery ([facility]) recommended that he taper medications prior to steroid injection scheduled for 7/12.  Patient further states that he was informed by neurosurgery that they feel patient should be on a different medication that would target nerve pain.  Patient also states he does not enjoy the way that medications make him feel, using the word \"foggy.\"    Patient states he had remaining 300mg films from previous prescription and has been taking 300mg daily of Belbuca for past 5 days.    Routing  to provider to advise on new information.     .Natty Tao RN  [facility]  [phone]    "

## 2023-07-06 DIAGNOSIS — C91.01 ACUTE LYMPHOBLASTIC LEUKEMIA (ALL) IN REMISSION (H): Primary | ICD-10-CM

## 2023-07-07 DIAGNOSIS — Z94.81 STATUS POST BONE MARROW TRANSPLANT (H): Primary | ICD-10-CM

## 2023-07-07 RX ORDER — ESCITALOPRAM OXALATE 10 MG/1
10 TABLET ORAL DAILY
Qty: 30 TABLET | Refills: 3 | Status: SHIPPED | OUTPATIENT
Start: 2023-07-07

## 2023-07-07 NOTE — TELEPHONE ENCOUNTER
"Patient states that he currently has been taking 300mcg films daily and has decreased to 150mcg daily starting today.    Patient states he currently has \"about 15\" 300mg films remaining.     Routing to provider to review.    Natty Tao RN  [facility]  [phone]    "

## 2023-07-07 NOTE — TELEPHONE ENCOUNTER
Patient states he will call clinic back when he is home to take accurate stock of bulbuca films.  Patient does identify he has been doing some home tapering of medication already.     Patient educated on disposal of oxycodone and verbalizes understanding.    Natty Tao RN  [facility]  [phone]

## 2023-07-07 NOTE — TELEPHONE ENCOUNTER
[facility] Call Center    Phone Message    May a detailed message be left on voicemail: yes     Reason for Call: Other: Patient is returning phone call to nurse, please call patient back to discuss further.      Action Taken: Message routed to:  Other: [facility]    Travel Screening: Not Applicable

## 2023-07-07 NOTE — TELEPHONE ENCOUNTER
Chart reviewed - I am happy to provider tapering instructions. He can follow up with neurosurgeon to explore alternative medications they are recommending. Please confirm all remaining Belbuca supply he has at home so we can determine exact prescriptions to taper off. The oxycodone is ordered as needed, so he can just stop taking it now. Please advise him to dispose of remaining medication at pharmacy drug drop box.     Akilah Shah, DOMINIC, APRN, AGNP-C  [facility]

## 2023-07-07 NOTE — PROGRESS NOTES
-- DO NOT REPLY / DO NOT REPLY ALL --  -- Message is from Engagement Center Operations (ECO) --      Patient phone# [phone]    Referral Request  Name of Specialist: Dr Gee Foreman  Provider's specialty: Pulmonary Medicine    Medical condition for referral:    327.23, V46.8 (ICD-9-CM) - G47.33, Z99.89 (ICD-10-CM) - JATIN on CPAP   493.90 (ICD-9-CM) - J45.20 (ICD-10-CM) - Mild intermittent asthma without complication     Previous referral . Patient has appointment Monday 7/10/2023    Is this a NEW request?: no      Referral ordered by: Chon BECKER [place]      Insurance type: see below       Payor: [facility] MEDICAID / Plan: [facility] MEDICAID HMO / Product Type: T19 HMO      Preferred Delivery Method   Fax - number to send to: [phone]   Can be sent to patient via portal        Alternative phone number: no    Can a detailed message be left? Yes    Please give this turnaround time to the caller:   \"This message will be sent to [state Provider's full name]. The clinical team will return your call as soon as they review your message. Typically, it takes 3 business days to process referral requests.\"   This is a recent snapshot of the patient's [facility] medical record.  For current drug dose and complete information and questions, call [phone] or In Basket pool, [facility] home infusion ([id number])  CSN Number:  [id number]

## 2023-07-10 ENCOUNTER — VIRTUAL VISIT (OUTPATIENT)
Dept: ANESTHESIOLOGY | Facility: CLINIC | Age: 63
End: 2023-07-10
Payer: COMMERCIAL

## 2023-07-10 DIAGNOSIS — T86.09 GVHD AS COMPLICATION OF BONE MARROW TRANSPLANT (H): ICD-10-CM

## 2023-07-10 DIAGNOSIS — M54.50 LUMBAR SPINE PAIN: Primary | ICD-10-CM

## 2023-07-10 DIAGNOSIS — M46.26 OSTEOMYELITIS OF LUMBAR SPINE (H): ICD-10-CM

## 2023-07-10 DIAGNOSIS — M25.551 RIGHT HIP PAIN: ICD-10-CM

## 2023-07-10 DIAGNOSIS — D89.813 GVHD AS COMPLICATION OF BONE MARROW TRANSPLANT (H): ICD-10-CM

## 2023-07-10 DIAGNOSIS — M54.10 RADICULAR PAIN OF RIGHT LOWER EXTREMITY: ICD-10-CM

## 2023-07-10 DIAGNOSIS — G06.1 ABSCESS IN EPIDURAL SPACE OF LUMBAR SPINE: ICD-10-CM

## 2023-07-10 PROCEDURE — 99214 OFFICE O/P EST MOD 30 MIN: CPT | Mod: 95

## 2023-07-10 ASSESSMENT — PAIN SCALES - GENERAL: PAINLEVEL: MILD PAIN (3)

## 2023-07-10 NOTE — LETTER
7/10/2023       RE: Nik Nava  [id number] [address] [facility] [address] [facility] [address]       Dear Colleague,    Thank you for referring your patient, Nik Nava, to the [facility] [place] at [facility]. Please see a copy of my visit note below.    Video-Visit Details    Type of service:  Video Visit     Originating Location (pt. Location): Home    Distant Location (provider location):  On-site  Platform used for Video Visit: [facility]     Date of visit: 7/10/2023      Assessment:   Nik Nava is a 63 year old male with a past medical history significant for Grave's disease, GVHD s/p bone marrow txp, generalized muscle weakness, ALL in remission, CKD, renal cell carcinoma,  who presents with complaints of low back and RLE pain.      Low back/RLE - Onset of pain occurred early March 2023 after fall on ice, had low back and tailbone pain. He then went to ED on 3/30/23 with worsening pain and was found to have epidural space abscess, discitis, osteomyelitis. He is currently following with ED for extended antibiotic course. Of note, he has hx of ALL (in remission), s/p BMT. Lumbar MRIs on 3/30/23 and 4/28/23, in addition to acute red flag findings, noted multilevel degenerative changes. He continues to have significant low back and RLE pain that extends down lateral aspect of leg into ankle. Denies foot paraesthesias, no leg weakness, no other red flag symptoms. On exam, mild TTP in lumbosacral area, focal tenderness of left lower lumbar, no red flag findings. Etiology of pain is likely multifactorial, including osteomyelitis, epidural abscess, underlying degenerative changes of spine, with overlying myofascial component to an extent.       Visit Diagnoses:  1. Lumbar spine pain    2. Radicular pain of right lower extremity    3. Right hip pain    4. GVHD as complication of bone marrow  transplant (H)    5. Osteomyelitis of lumbar spine (H)    6. Abscess in epidural space of lumbar spine        Plan:  Diagnosis reviewed, treatment option addressed, and risk/benifits discussed.  Self-care instructions given.  I am recommending a multidisciplinary treatment plan to help this patient better manage their pain.      Pain Physical Therapy:  NO   Continue PT per neurosurgery recommendation for now, activity as tolerated at home.      Pain Psychologist to address relaxation, behavioral change, coping style, and other factors important to improvement.  NO     Diagnostic Studies:  Reviewed lumbar MRI in chart - demonstrated multilevel degenerative changes, discitis, epidural space abscess and osteomyelitis.     Medication Management:  Belbuca -at last visit, recommended increase to 450 mcg twice daily.  He reach out in between visits and advised that he is recently seen external neurosurgeon, who recommended that he taper off of Belbuca and oxycodone prior to injection this week on Wednesday.  Nik has been working on tapering down on dosage, currently taking 150 mcg twice daily.  We had a discussion regarding longer-term plan with Belbuca, pending outcome from injection and his decision to pursue surgery.  Advised that he could try dropping down to 150 mcg daily for the next 2 days, then hold morning of procedure.  We will need to have a follow-up appointment to discuss resuming Belbuca, advised that he may continue oxycodone as needed until then.  Continue oxycodone 5 mg up to twice daily as needed for breakthrough pain.   No refills today, advised to let me know if he needs one in between visits.   Naloxone - Prescription sent into pharmacy at prior visit. Advised to  to have on hand at home in case of accidental opioid overdose.   Controlled substance agreement and UDS completed on 5/12/23.   Apply diclofenac gel to painful joints at least 2 x day, ideally 3-4 x day. This medication works best  "when used consistently. Monitor for benefit over the next 1-2 weeks.      Potential procedures:   Deferred - He recently saw external neurosurgery, reports injection scheduled this week on Wednesday, may be considering surgery.      Other Orders/Referrals:   None      Follow up with PEE Fernandes CNP after injection - advised to MyChart with update, if he plans to pursue surgery. Otherwise, schedule follow up to discuss resuming Belbuca.     Review of Electronic Chart: Today I have also reviewed available medical information in the patient's medical record at [facility] and Care Everywhere (if available), including relevant provider notes, laboratory work, and imaging.     Akilah Shah, DOMINIC, PEE, AGNP-C  [facility]     -------------------------------------------------    Subjective:    Chief complaint:   Chief Complaint   Patient presents with    Follow Up     Follow-up Lower back, right side lower extermities       Interval history:  Nik Nava is a 63 year old male last seen on 6/15/23.  They are a patient of mine seen in follow up.     Recommendations/plan at the last visit included:  Pain Physical Therapy:  NO   He is currently working with home PT. May consider pain PT in the future, if needed.      Pain Psychologist to address relaxation, behavioral change, coping style, and other factors important to improvement.  NO     Diagnostic Studies:  Reviewed lumbar MRI in chart - demonstrated multilevel degenerative changes, discitis, epidural space abscess and osteomyelitis.     Medication Management:   Increase Belbuca to 450 mcg twice daily. He reports significant side effects with Oxycontin (notably sedation and \"brain fog\"), but this has improved since transition to Belbuca. He has not appreciated much difference in pain levels with 300 mcg dosage, so we will increase today. Advised to monitor for improvement in baseline pain and intensity of fluctuations. New " prescription for 450 mcg #60 films sent into pharmacy today.   Monitor for sedation - may cause dizziness or drowsiness. Be careful driving/moving around until you know effects of medication. Avoid concurrent alcohol use.   Continue oxycodone 5 mg up to twice daily as needed for breakthrough pain. Refilled today for #60 tabs.   Naloxone - Prescription sent into pharmacy at prior visit. Advised to  to have on hand at home in case of accidental opioid overdose.   Controlled substance agreement and UDS completed on 5/12/23.   Apply diclofenac gel to painful joints at least 2 x day, ideally 3-4 x day. This medication works best when used consistently. Monitor for benefit over the next 1-2 weeks.      Potential procedures:   Deferred - we may consider in future, once infection fully resolves and cleared by infectious disease.      Other Orders/Referrals:   None      Follow up with PEE Fernandes CNP in 4 weeks      Since his last visit, Nik Nava reports:  -He saw neurosurgeon since last visit, who advised him to taper off Belbuca and oxycodone.   -He has been working on reducing dosage on his own, down to Belbuca 150 mcg BID.   -He states he is having injection on Wednesday with neurosurgery.   -He is going to wait until after injection to decide how to proceed with surgery and possibly medications.   -His last dose of oxycodone was 4-5 days ago.   -He may be interested in resuming Belbuca, pending outcome from injection.   -He is not sure if he wants to pursue surgery.   -He reports surgeon recommended possibly gabapentin to help with nerve pain.   -He is interested in possibly exploring this, pending outcome from injection.         Pain Information:   Pain rating: averages 3/10 on a 0-10 scale.      Interval history from last visit on 6/15/23:  -his pain is about the same as it was at last visit.   -He notes he has been more active recently.   -Belbuca was increased at last visit to 300 mcg, has not  "appreciated improvement in pain.   -He notes his increased activity is impacting pain levels.   -He uses oxycodone 5 mg 1-2 x day PRN for breakthrough pain.   -He does not like to use oxycodone due to side effects.   -He has upcoming appointment with neurosurgery, states they will be doing another MRI.   -He states   -He has some mouth irritation from buccal film.   -No side effects   Pain Information:              Pain rating: averages 5/10 on a 0-10 scale.        Interval history from last visit on 5/24/23:  -his pain is about the same as it was at last visit.   -He was started on Belbuca 150 mcg BID at last visit.   -He was previously taking Oxycontin BID that caused marked sedation.   -He states things are \"okay\" since starting Belbuca, but he is having some oral irritation.   -He has hx of GVHD in his mouth, no open sores but he is concerned about this possibility.  -He has not appreciated significant benefit for pain with Belbuca 150 mcg BID.   -He has noticed improvement in mental clarity.  -No other side effects aside from mild oral irritation.   -He was given oxycodone 5 mg #20 tabs at last visit for breakthrough pain, reports he has only used 1 tablet of this prescription.   -He uses diclofenac gel on low back, right hip and RLE.   -He requests refill for diclofenac gel.   -He finishes long term abx tomorrow.   -He will be following up with Infectious Disease and neurosurgery soon.   -His UDS was positive for tramadol, which he was given prior. Denies tramadol use since last visit.   -We discussed [facility] drug drop boxes for old medications.   -His wife Lamar is present for the visit today.   Pain Information:              Pain rating: averages 2-3/10 on a 0-10 scale.           HPI from initial visit with me on 5/12/23:  Nik Nava is a 63 year old male presents with a chief complaint of low back and right hip pain, .      The pain has been present for 2+ months .    The pain is Extreme Pain (9) in " "severity.    The pain is described as throbbing in low back and thigh, \"zingers\" down the leg.   The pain is alleviated by meds (oxycontin), rest/lying down.    It is exacerbated by prolonged sitting and walking, driving.    Modalities that have been utilized in the past which were helpful include oxycodone while inpatient.    Things that were not helpful, but tried ,include tramadol while inpatient, .    The patient has never tried pain PT, injections (cannot do right now due to osteomyelitis).  The patient otherwise denies bowel or bladder incontinence, parasthesias, weakness, saddle anesthesia, unintentional weight loss, or fever/chills/sweats.   Nik Nava has not been seen at a pain clinic in the past.  He had inpatient consult while hospitalized 3/30 ([facility])  -He had fall on ice 3/5/23, tailbone pain initially, then that has improved but continues to have low back and leg pain.   -He went to ED and admitted to hospital ([facility]) for osetomyelitis in the low back.   -He had pain team consult while inpatient, was started on oxycodone, tramadol, and hydroxyzine.   -Denies paraesthesias in feet.   -Denies weakness.   -He follows with Inf Dis, still on antibiotics.   -He finds MS contin helpful but he has significant sedation.   -His wife notes improvement in functioning after starting Oxycontin.   -He has home PT right now, chiropractor in the past.   -Oxycodone 5 mg at bedtime PRN prior to fall/infection  -Daughter Luis Miguel is present for visit, wife Lamar on the phone.           Current Pain Treatments:    Medications:                             Tylenol 1000 mg               Belbuca 150 mcg BID - advised to decrease 150 mcg daily, then off prior to injection (per pt request)              Oxycodone 5 mg BID PRN               Naloxone - sent into pharmacy at prior visit     2. Other therapies:               Inf Disease - currently on antibiotic therapy               Home PT     Current MME: 0-15     Review of " [place] Prescription Monitoring Program (): No concern for abuse or misuse of controlled medications based on this report. Reviewed - appears appropriate.      Annual Controlled Substance Agreement/UDS due date: Completed on 5/12/23     Past pain treatments:  Surgery        Medications:  Current Outpatient Medications   Medication Sig Dispense Refill    acetaminophen (TYLENOL) 500 MG tablet       acyclovir (ZOVIRAX) 800 MG tablet Take 1 tablet (800 mg) by mouth 2 times daily 60 tablet 4    acyclovir (ZOVIRAX) 800 MG tablet Take 800 mg by mouth      azithromycin (ZITHROMAX) 250 MG tablet Take 1 tablet (250 mg) by mouth daily 30 tablet 3    azithromycin (ZITHROMAX) 250 MG tablet Take 1 tablet by mouth daily      Belumosudil Mesylate 200 MG TABS Take 200 mg by mouth daily 30 tablet 3    Buprenorphine HCl (BELBUCA) 450 MCG FILM buccal film Place 1 Film (450 mcg) inside cheek every 12 hours 60 Film 0    Carboxymethylcell-Glycerin PF (REFRESH RELIEVA PF) 0.5-1 % SOLN Apply 1 drop to eye 4 times daily Preservative free. Either PF multidose bottle or PF vials 30 each 11    carboxymethylcellulose PF (CARBOXYMETHYLCELLULOSE SODIUM) 0.5 % ophthalmic solution Place 1 drop into both eyes 4 times daily 70 each 11    dexamethasone alcohol-free (DECADRON) 0.1 MG/ML solution Swish and spit 20 mLs (2 mg) in mouth 4 times daily 1000 mL 3    diclofenac (VOLTAREN) 1 % topical gel Apply 4 g topically 4 times daily 350 g 1    erythromycin (ROMYCIN) 5 MG/GM ophthalmic ointment Place 0.5 inches into both eyes At Bedtime (Patient taking differently: Place into both eyes At Bedtime) 3.5 g 4    escitalopram (LEXAPRO) 10 MG tablet Take 1 tablet (10 mg) by mouth daily 30 tablet 3    ferrous sulfate (FE TABS) 325 (65 Fe) MG EC tablet Take 325 mg by mouth      fluorometholone (FML LIQUIFILM) 0.1 % ophthalmic suspension Apply 1 drop to eye      fluorometholone (FML LIQUIFILM) 0.1 % ophthalmic suspension Place 1 Drop into both eyes twice daily  for 21 days then stop 5 mL 2    hydrochlorothiazide (HYDRODIURIL) 12.5 MG tablet       levothyroxine (SYNTHROID/LEVOTHROID) 150 MCG tablet Take 1 tablet by mouth daily      levothyroxine (SYNTHROID/LEVOTHROID) 150 MCG tablet Take 1 tablet (150 mcg) by mouth daily 30 tablet 11    LORazepam (ATIVAN) 1 MG tablet Take 1 mg by mouth      LORazepam (ATIVAN) 1 MG tablet Take 1 tablet (1 mg) by mouth nightly as needed for anxiety or sleep 30 tablet 3    magnesium oxide (MAG-OX) 400 MG tablet Take 1 tablet (400 mg) by mouth 2 times daily 120 tablet 1    naloxone (NARCAN) 4 MG/0.1ML nasal spray Spray 1 spray (4 mg) into one nostril alternating nostrils as needed for opioid reversal every 2-3 minutes until assistance arrives 0.2 mL 0    nicotine polacrilex (NICORETTE) 4 MG gum Take 4 mg by mouth as needed for smoking cessation       Omega-3 Fish Oil 500 MG capsule Take 2 capsules (1,000 mg) by mouth daily 120 capsule 3    omeprazole (PRILOSEC) 40 MG DR capsule Take 1 capsule by mouth daily      omeprazole (PRILOSEC) 40 MG DR capsule Take 1 capsule (40 mg) by mouth daily 30 capsule 3    ondansetron (ZOFRAN ODT) 8 MG ODT tab       oxyCODONE (ROXICODONE) 5 MG tablet Take 1-2 tablets (5-10 mg) by mouth 2 times daily as needed for pain 30 tablet 0    oxyCODONE (ROXICODONE) 5 MG tablet       oxyCODONE-acetaminophen (PERCOCET) 5-325 MG tablet Take 1 tablet by mouth daily      posaconazole (NOXAFIL) 100 MG EC tablet Take 3 tablets (300 mg) by mouth every morning 90 tablet 3    predniSONE (DELTASONE) 1 MG tablet Take 4 tablets (4 mg) by mouth daily Take on even days 120 tablet 1    predniSONE (DELTASONE) 5 MG tablet Take 2 tablets (10 mg) by mouth every other day Take on odd days 60 tablet 3    REZUROCK 200 MG TABS TAKE ONE TABLET BY MOUTH ONCE DAILY 30 tablet 3    rosuvastatin (CRESTOR) 5 MG tablet Take 1 tablet (5 mg) by mouth daily 30 tablet 3    sennosides (SENOKOT) 8.6 MG tablet Take 1 tablet by mouth 3 times daily as needed for  constipation 90 tablet 0    sodium chloride 0.9 % neb solution 3 mLs by Other route 2 times daily 300 mL 11    UNABLE TO FIND Take 1,200 mg by mouth daily MEDICATION NAME: Flaxseed Oil      zinc 50 MG TABS Take 100 mg by mouth daily Take 100 mg daily for 6 weeks 82 tablet 0       Medical History: any changes in medical history since they were last seen? No      Objective:    Physical Exam:  There were no vitals taken for this visit.  GENERAL: Healthy, alert and no distress  EYES: Eyes grossly normal to inspection.  No discharge or erythema, or obvious scleral/conjunctival abnormalities.  RESP: No audible wheeze, cough, or visible cyanosis.  No visible retractions or increased work of breathing.    SKIN: Visible skin clear. No significant rash, abnormal pigmentation or lesions.  NEURO: Cranial nerves grossly intact.  Mentation and speech appropriate for age.  PSYCH: Mentation appears normal, affect normal/bright, judgement and insight intact, normal speech and appearance well-groomed.    Diagnostic Tests/Imaging/Labs:  None     BILLING TIME DOCUMENTATION:   The total TIME spent on this patient on the date of the encounter/appointment was 33 minutes.      TOTAL TIME includes:   Time spent preparing to see the patient (reviewing records and tests)   Time spent face to face (or over the phone) with the patient   Time spent ordering tests, medications, procedures and referrals   Time spent Referring and communicating with other healthcare professionals   Time spent documenting clinical information in Epic             Nik is a 63 year old who is being evaluated via a billable video visit.      How would you like to obtain your AVS? MyChart  If the video visit is dropped, the invitation should be resent by: Text to cell phone: [phone]  Will anyone else be joining your video visit? No            Again, thank you for allowing me to participate in the care of your patient.      Sincerely,    PEE Fernandes CNP

## 2023-07-10 NOTE — PROGRESS NOTES
Video-Visit Details    Type of service:  Video Visit     Originating Location (pt. Location): Home    Distant Location (provider location):  On-site  Platform used for Video Visit: [facility]     Date of visit: 7/10/2023      Assessment:   Nik Nava is a 63 year old male with a past medical history significant for Grave's disease, GVHD s/p bone marrow txp, generalized muscle weakness, ALL in remission, CKD, renal cell carcinoma,  who presents with complaints of low back and RLE pain.      1. Low back/RLE - Onset of pain occurred early March 2023 after fall on ice, had low back and tailbone pain. He then went to ED on 3/30/23 with worsening pain and was found to have epidural space abscess, discitis, osteomyelitis. He is currently following with ED for extended antibiotic course. Of note, he has hx of ALL (in remission), s/p BMT. Lumbar MRIs on 3/30/23 and 4/28/23, in addition to acute red flag findings, noted multilevel degenerative changes. He continues to have significant low back and RLE pain that extends down lateral aspect of leg into ankle. Denies foot paraesthesias, no leg weakness, no other red flag symptoms. On exam, mild TTP in lumbosacral area, focal tenderness of left lower lumbar, no red flag findings. Etiology of pain is likely multifactorial, including osteomyelitis, epidural abscess, underlying degenerative changes of spine, with overlying myofascial component to an extent.       Visit Diagnoses:  1. Lumbar spine pain    2. Radicular pain of right lower extremity    3. Right hip pain    4. 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 Osteomyelitis of lumbar spine (H)    6. Abscess in epidural space of lumbar spine        Plan:  Diagnosis reviewed, treatment option addressed, and risk/benifits discussed.  Self-care instructions given.  I am recommending a multidisciplinary treatment plan to help this patient better manage their pain.      Pain  Physical Therapy:  NO   Continue PT per neurosurgery recommendation for now, activity as tolerated at home.      Pain Psychologist to address relaxation, behavioral change, coping style, and other factors important to improvement.  NO     Diagnostic Studies:  Reviewed lumbar MRI in chart - demonstrated multilevel degenerative changes, discitis, epidural space abscess and osteomyelitis.     Medication Management:    Belbuca -at last visit, recommended increase to 450 mcg twice daily.  He reach out in between visits and advised that he is recently seen external neurosurgeon, who recommended that he taper off of Belbuca and oxycodone prior to injection this week on Wednesday.  Nik has been working on tapering down on dosage, currently taking 150 mcg twice daily.  We had a discussion regarding longer-term plan with Belbuca, pending outcome from injection and his decision to pursue surgery.  Advised that he could try dropping down to 150 mcg daily for the next 2 days, then hold morning of procedure.  We will need to have a follow-up appointment to discuss resuming Belbuca, advised that he may continue oxycodone as needed until then.    Continue oxycodone 5 mg up to twice daily as needed for breakthrough pain.   No refills today, advised to let me know if he needs one in between visits.     Naloxone - Prescription sent into pharmacy at prior visit. Advised to  to have on hand at home in case of accidental opioid overdose.     Controlled substance agreement and UDS completed on 5/12/23.     Apply diclofenac gel to painful joints at least 2 x day, ideally 3-4 x day. This medication works best when used consistently. Monitor for benefit over the next 1-2 weeks.      Potential procedures:     Deferred - He recently saw external neurosurgery, reports injection scheduled this week on Wednesday, may be considering surgery.      Other Orders/Referrals:     None      Follow up with PEE Fernandes CNP after injection -  "advised to [place] with update, if he plans to pursue surgery. Otherwise, schedule follow up to discuss resuming Belbuca.     Review of Electronic Chart: Today I have also reviewed available medical information in the patient's medical record at [facility] ([facility]) and Care Everywhere (if available), including relevant provider notes, laboratory work, and imaging.     Akilah Shah, DNP, APRN, AGNP-C  [facility]     -------------------------------------------------    Subjective:    Chief complaint:   Chief Complaint   Patient presents with     Follow Up     Follow-up Lower back, right side lower extermities       Interval history:  Nik Nava is a 63 year old male last seen on 6/15/23.  They are a patient of mine seen in follow up.     Recommendations/plan at the last visit included:  Pain Physical Therapy:  NO   He is currently working with home PT. May consider pain PT in the future, if needed.      Pain Psychologist to address relaxation, behavioral change, coping style, and other factors important to improvement.  NO     Diagnostic Studies:  Reviewed lumbar MRI in chart - demonstrated multilevel degenerative changes, discitis, epidural space abscess and osteomyelitis.     Medication Management:     Increase Belbuca to 450 mcg twice daily. He reports significant side effects with Oxycontin (notably sedation and \"brain fog\"), but this has improved since transition to Belbuca. He has not appreciated much difference in pain levels with 300 mcg dosage, so we will increase today. Advised to monitor for improvement in baseline pain and intensity of fluctuations. New prescription for 450 mcg #60 films sent into pharmacy today.   ? Monitor for sedation - may cause dizziness or drowsiness. Be careful driving/moving around until you know effects of medication. Avoid concurrent alcohol use.     Continue oxycodone 5 mg up to twice daily as needed for breakthrough pain. Refilled today for #60 " tabs.     Naloxone - Prescription sent into pharmacy at prior visit. Advised to  to have on hand at home in case of accidental opioid overdose.     Controlled substance agreement and UDS completed on 5/12/23.     Apply diclofenac gel to painful joints at least 2 x day, ideally 3-4 x day. This medication works best when used consistently. Monitor for benefit over the next 1-2 weeks.      Potential procedures:     Deferred - we may consider in future, once infection fully resolves and cleared by infectious disease.      Other Orders/Referrals:     None      Follow up with PEE Fernandes CNP in 4 weeks      Since his last visit, Nik Nava reports:  -He saw neurosurgeon since last visit, who advised him to taper off Belbuca and oxycodone.   -He has been working on reducing dosage on his own, down to Belbuca 150 mcg BID.   -He states he is having injection on Wednesday with neurosurgery.   -He is going to wait until after injection to decide how to proceed with surgery and possibly medications.   -His last dose of oxycodone was 4-5 days ago.   -He may be interested in resuming Belbuca, pending outcome from injection.   -He is not sure if he wants to pursue surgery.   -He reports surgeon recommended possibly gabapentin to help with nerve pain.   -He is interested in possibly exploring this, pending outcome from injection.         Pain Information:   Pain rating: averages 3/10 on a 0-10 scale.      Interval history from last visit on 6/15/23:  -his pain is about the same as it was at last visit.   -He notes he has been more active recently.   -Belbuca was increased at last visit to 300 mcg, has not appreciated improvement in pain.   -He notes his increased activity is impacting pain levels.   -He uses oxycodone 5 mg 1-2 x day PRN for breakthrough pain.   -He does not like to use oxycodone due to side effects.   -He has upcoming appointment with neurosurgery, states they will be doing another MRI.   -He  "states   -He has some mouth irritation from buccal film.   -No side effects   Pain Information:              Pain rating: averages 5/10 on a 0-10 scale.        Interval history from last visit on 5/24/23:  -his pain is about the same as it was at last visit.   -He was started on Belbuca 150 mcg BID at last visit.   -He was previously taking Oxycontin BID that caused marked sedation.   -He states things are \"okay\" since starting Belbuca, but he is having some oral irritation.   -He has hx of GVHD in his mouth, no open sores but he is concerned about this possibility.  -He has not appreciated significant benefit for pain with Belbuca 150 mcg BID.   -He has noticed improvement in mental clarity.  -No other side effects aside from mild oral irritation.   -He was given oxycodone 5 mg #20 tabs at last visit for breakthrough pain, reports he has only used 1 tablet of this prescription.   -He uses diclofenac gel on low back, right hip and RLE.   -He requests refill for diclofenac gel.   -He finishes long term abx tomorrow.   -He will be following up with Infectious Disease and neurosurgery soon.   -His UDS was positive for tramadol, which he was given prior. Denies tramadol use since last visit.   -We discussed [facility] drug drop boxes for old medications.   -His wife Lamar is present for the visit today.   Pain Information:              Pain rating: averages 2-3/10 on a 0-10 scale.           HPI from initial visit with me on 5/12/23:  Nik Nava is a 63 year old male presents with a chief complaint of low back and right hip pain, .      The pain has been present for 2+ months .    The pain is Extreme Pain (9) in severity.    The pain is described as throbbing in low back and thigh, \"zingers\" down the leg.   The pain is alleviated by meds (oxycontin), rest/lying down.    It is exacerbated by prolonged sitting and walking, driving.    Modalities that have been utilized in the past which were helpful include oxycodone while " inpatient.    Things that were not helpful, but tried ,include tramadol while inpatient, .    The patient has never tried pain PT, injections (cannot do right now due to osteomyelitis).  The patient otherwise denies bowel or bladder incontinence, parasthesias, weakness, saddle anesthesia, unintentional weight loss, or fever/chills/sweats.   Nik Nava has not been seen at a pain clinic in the past.  He had inpatient consult while hospitalized 3/30 ([facility])  -He had fall on ice 3/5/23, tailbone pain initially, then that has improved but continues to have low back and leg pain.   -He went to ED and admitted to hospital ([facility]) for osetomyelitis in the low back.   -He had pain team consult while inpatient, was started on oxycodone, tramadol, and hydroxyzine.   -Denies paraesthesias in feet.   -Denies weakness.   -He follows with Inf Dis, still on antibiotics.   -He finds MS contin helpful but he has significant sedation.   -His wife notes improvement in functioning after starting Oxycontin.   -He has home PT right now, chiropractor in the past.   -Oxycodone 5 mg at bedtime PRN prior to fall/infection  -Daughter Luis Miguel is present for visit, wife Lamar on the phone.           Current Pain Treatments:    1. Medications:                             Tylenol 1000 mg               Belbuca 150 mcg BID - advised to decrease 150 mcg daily, then off prior to injection (per pt request)              Oxycodone 5 mg BID PRN               Naloxone - sent into pharmacy at prior visit     2. Other therapies:               Inf Disease - currently on antibiotic therapy               Home PT     Current MME: 0-15     Review of [place] Prescription Monitoring Program (): No concern for abuse or misuse of controlled medications based on this report. Reviewed - appears appropriate.      Annual Controlled Substance Agreement/UDS due date: Completed on 5/12/23     Past pain treatments:  Surgery        Medications:  Current Outpatient  Medications   Medication Sig Dispense Refill     acetaminophen (TYLENOL) 500 MG tablet        acyclovir (ZOVIRAX) 800 MG tablet Take 1 tablet (800 mg) by mouth 2 times daily 60 tablet 4     acyclovir (ZOVIRAX) 800 MG tablet Take 800 mg by mouth       azithromycin (ZITHROMAX) 250 MG tablet Take 1 tablet (250 mg) by mouth daily 30 tablet 3     azithromycin (ZITHROMAX) 250 MG tablet Take 1 tablet by mouth daily       Belumosudil Mesylate 200 MG TABS Take 200 mg by mouth daily 30 tablet 3     Buprenorphine HCl (BELBUCA) 450 MCG FILM buccal film Place 1 Film (450 mcg) inside cheek every 12 hours 60 Film 0     Carboxymethylcell-Glycerin PF (REFRESH RELIEVA PF) 0.5-1 % SOLN Apply 1 drop to eye 4 times daily Preservative free. Either PF multidose bottle or PF vials 30 each 11     carboxymethylcellulose PF (CARBOXYMETHYLCELLULOSE SODIUM) 0.5 % ophthalmic solution Place 1 drop into both eyes 4 times daily 70 each 11     dexamethasone alcohol-free (DECADRON) 0.1 MG/ML solution Swish and spit 20 mLs (2 mg) in mouth 4 times daily 1000 mL 3     diclofenac (VOLTAREN) 1 % topical gel Apply 4 g topically 4 times daily 350 g 1     erythromycin (ROMYCIN) 5 MG/GM ophthalmic ointment Place 0.5 inches into both eyes At Bedtime (Patient taking differently: Place into both eyes At Bedtime) 3.5 g 4     escitalopram (LEXAPRO) 10 MG tablet Take 1 tablet (10 mg) by mouth daily 30 tablet 3     ferrous sulfate (FE TABS) 325 (65 Fe) MG EC tablet Take 325 mg by mouth       fluorometholone (FML LIQUIFILM) 0.1 % ophthalmic suspension Apply 1 drop to eye       fluorometholone (FML LIQUIFILM) 0.1 % ophthalmic suspension Place 1 Drop into both eyes twice daily for 21 days then stop 5 mL 2     hydrochlorothiazide (HYDRODIURIL) 12.5 MG tablet        levothyroxine (SYNTHROID/LEVOTHROID) 150 MCG tablet Take 1 tablet by mouth daily       levothyroxine (SYNTHROID/LEVOTHROID) 150 MCG tablet Take 1 tablet (150 mcg) by mouth daily 30 tablet 11     LORazepam  (ATIVAN) 1 MG tablet Take 1 mg by mouth       LORazepam (ATIVAN) 1 MG tablet Take 1 tablet (1 mg) by mouth nightly as needed for anxiety or sleep 30 tablet 3     magnesium oxide (MAG-OX) 400 MG tablet Take 1 tablet (400 mg) by mouth 2 times daily 120 tablet 1     naloxone (NARCAN) 4 MG/0.1ML nasal spray Spray 1 spray (4 mg) into one nostril alternating nostrils as needed for opioid reversal every 2-3 minutes until assistance arrives 0.2 mL 0     nicotine polacrilex (NICORETTE) 4 MG gum Take 4 mg by mouth as needed for smoking cessation        Omega-3 Fish Oil 500 MG capsule Take 2 capsules (1,000 mg) by mouth daily 120 capsule 3     omeprazole (PRILOSEC) 40 MG DR capsule Take 1 capsule by mouth daily       omeprazole (PRILOSEC) 40 MG DR capsule Take 1 capsule (40 mg) by mouth daily 30 capsule 3     ondansetron (ZOFRAN ODT) 8 MG ODT tab        oxyCODONE (ROXICODONE) 5 MG tablet Take 1-2 tablets (5-10 mg) by mouth 2 times daily as needed for pain 30 tablet 0     oxyCODONE (ROXICODONE) 5 MG tablet        oxyCODONE-acetaminophen (PERCOCET) 5-325 MG tablet Take 1 tablet by mouth daily       posaconazole (NOXAFIL) 100 MG EC tablet Take 3 tablets (300 mg) by mouth every morning 90 tablet 3     predniSONE (DELTASONE) 1 MG tablet Take 4 tablets (4 mg) by mouth daily Take on even days 120 tablet 1     predniSONE (DELTASONE) 5 MG tablet Take 2 tablets (10 mg) by mouth every other day Take on odd days 60 tablet 3     REZUROCK 200 MG TABS TAKE ONE TABLET BY MOUTH ONCE DAILY 30 tablet 3     rosuvastatin (CRESTOR) 5 MG tablet Take 1 tablet (5 mg) by mouth daily 30 tablet 3     sennosides (SENOKOT) 8.6 MG tablet Take 1 tablet by mouth 3 times daily as needed for constipation 90 tablet 0     sodium chloride 0.9 % neb solution 3 mLs by Other route 2 times daily 300 mL 11     UNABLE TO FIND Take 1,200 mg by mouth daily MEDICATION NAME: Flaxseed Oil       zinc 50 MG TABS Take 100 mg by mouth daily Take 100 mg daily for 6 weeks 82  tablet 0       Medical History: any changes in medical history since they were last seen? No      Objective:    Physical Exam:  There were no vitals taken for this visit.  GENERAL: Healthy, alert and no distress  EYES: Eyes grossly normal to inspection.  No discharge or erythema, or obvious scleral/conjunctival abnormalities.  RESP: No audible wheeze, cough, or visible cyanosis.  No visible retractions or increased work of breathing.    SKIN: Visible skin clear. No significant rash, abnormal pigmentation or lesions.  NEURO: Cranial nerves grossly intact.  Mentation and speech appropriate for age.  PSYCH: Mentation appears normal, affect normal/bright, judgement and insight intact, normal speech and appearance well-groomed.    Diagnostic Tests/Imaging/Labs:  None     BILLING TIME DOCUMENTATION:   The total TIME spent on this patient on the date of the encounter/appointment was 33 minutes.      TOTAL TIME includes:   Time spent preparing to see the patient (reviewing records and tests)   Time spent face to face (or over the phone) with the patient   Time spent ordering tests, medications, procedures and referrals   Time spent Referring and communicating with other healthcare professionals   Time spent documenting clinical information in Epic

## 2023-07-10 NOTE — PROGRESS NOTES
Nik is a 63 year old who is being evaluated via a billable video visit.      How would you like to obtain your AVS? MyChart  If the video visit is dropped, the invitation should be resent by: Text to cell phone: [phone]  Will anyone else be joining your video visit? No

## 2023-07-10 NOTE — PATIENT INSTRUCTIONS
Pain Physical Therapy:  NO   Continue PT per neurosurgery recommendation for now, activity as tolerated at home.      Pain Psychologist to address relaxation, behavioral change, coping style, and other factors important to improvement.  NO     Diagnostic Studies:  Reviewed lumbar MRI in chart - demonstrated multilevel degenerative changes, discitis, epidural space abscess and osteomyelitis.     Medication Management:  Belbuca -at last visit, recommended increase to 450 mcg twice daily.  He reach out in between visits and advised that he is recently seen external neurosurgeon, who recommended that he taper off of Belbuca and oxycodone prior to injection this week on Wednesday.  Nik has been working on tapering down on dosage, currently taking 150 mcg twice daily.  We had a discussion regarding longer-term plan with Belbuca, pending outcome from injection and his decision to pursue surgery.  Advised that he could try dropping down to 150 mcg daily for the next 2 days, then hold morning of procedure.  We will need to have a follow-up appointment to discuss resuming Belbuca, advised that he may continue oxycodone as needed until then.  Continue oxycodone 5 mg up to twice daily as needed for breakthrough pain.   No refills today, advised to let me know if he needs one in between visits.   Naloxone - Prescription sent into pharmacy at prior visit. Advised to  to have on hand at home in case of accidental opioid overdose.   Controlled substance agreement and UDS completed on 5/12/23.   Apply diclofenac gel to painful joints at least 2 x day, ideally 3-4 x day. This medication works best when used consistently. Monitor for benefit over the next 1-2 weeks.      Potential procedures:   Deferred - He recently saw external neurosurgery, reports injection scheduled this week on Wednesday, may be considering surgery.      Other Orders/Referrals:   None      Follow up with PEE Fernandes CNP after injection - advised to  [place] with update, if he plans to pursue surgery. Otherwise, schedule follow up to discuss resuming Belbuca.

## 2023-07-10 NOTE — NURSING NOTE
Patient presents with:  Follow Up: Follow-up Lower back, right side lower extermities      Mild Pain (3)     Pain Medications     Analgesics Other Refills Start End     acetaminophen (TYLENOL) 500 MG tablet          Class: Historical    Opioid Combinations Refills Start End     oxyCODONE-acetaminophen (PERCOCET) 5-325 MG tablet     3/20/2023     Sig - Route: Take 1 tablet by mouth daily - Oral    Class: Historical    Opioid Agonists Refills Start End     oxyCODONE (ROXICODONE) 5 MG tablet    0 6/15/2023     Sig - Route: Take 1-2 tablets (5-10 mg) by mouth 2 times daily as needed for pain - Oral    Class: E-Prescribe    Earliest Fill Date: 6/15/2023     oxyCODONE (ROXICODONE) 5 MG tablet     4/19/2023     Class: Historical    Opioid Partial Agonists Refills Start End     Buprenorphine HCl (BELBUCA) 450 MCG FILM buccal film    0 6/15/2023     Sig - Route: Place 1 Film (450 mcg) inside cheek every 12 hours - Buccal    Class: E-Prescribe    Prior authorization: Closed - Prior Authorization not required for patient/medication          What medications are you using for pain? Belbuca    (New patients only) Have you been seen by another pain clinic/ provider? no    (Return Patients only) What refills are you needing today? no

## 2023-07-12 NOTE — TELEPHONE ENCOUNTER
He had follow up with me 7/10/23, addressed [facility].     Akilah Shah, DOMINIC, APRN, AGNP-C  [facility]

## 2023-07-16 ENCOUNTER — HEALTH MAINTENANCE LETTER (OUTPATIENT)
Age: 63
End: 2023-07-16

## 2023-07-22 ENCOUNTER — TELEPHONE (OUTPATIENT)
Dept: OPHTHALMOLOGY | Facility: CLINIC | Age: 63
End: 2023-07-22
Payer: MEDICARE

## 2023-07-22 DIAGNOSIS — H16.123 FILAMENTARY KERATITIS OF BOTH EYES: ICD-10-CM

## 2023-07-22 DIAGNOSIS — T86.09 GVHD AS COMPLICATION OF BONE MARROW TRANSPLANT (H): ICD-10-CM

## 2023-07-22 DIAGNOSIS — H04.129 DRY EYE: ICD-10-CM

## 2023-07-22 DIAGNOSIS — D89.813 GVHD AS COMPLICATION OF BONE MARROW TRANSPLANT (H): ICD-10-CM

## 2023-07-24 NOTE — TELEPHONE ENCOUNTER
fluorometholone (FML LIQUIFILM) 0.1 % ophthalmic suspension    Last Written Prescription Date:  03-  Last Fill Quantity: 5 ml,   # refills: 2  Last Office Visit : 5-3-2023  Future Office visit:  none

## 2023-07-25 ENCOUNTER — MYC MEDICAL ADVICE (OUTPATIENT)
Dept: PALLIATIVE MEDICINE | Facility: CLINIC | Age: 63
End: 2023-07-25

## 2023-07-25 DIAGNOSIS — C91.01 ACUTE LYMPHOBLASTIC LEUKEMIA (ALL) IN REMISSION (H): ICD-10-CM

## 2023-07-25 DIAGNOSIS — T86.09 GVHD AS COMPLICATION OF BONE MARROW TRANSPLANT (H): Primary | ICD-10-CM

## 2023-07-25 DIAGNOSIS — M25.551 RIGHT HIP PAIN: ICD-10-CM

## 2023-07-25 DIAGNOSIS — M54.10 RADICULAR PAIN OF RIGHT LOWER EXTREMITY: ICD-10-CM

## 2023-07-25 DIAGNOSIS — G06.1 ABSCESS IN EPIDURAL SPACE OF LUMBAR SPINE: ICD-10-CM

## 2023-07-25 DIAGNOSIS — T86.09 GVHD AS COMPLICATION OF BONE MARROW TRANSPLANT (H): ICD-10-CM

## 2023-07-25 DIAGNOSIS — M46.26 OSTEOMYELITIS OF LUMBAR SPINE (H): ICD-10-CM

## 2023-07-25 DIAGNOSIS — M54.50 LUMBAR SPINE PAIN: ICD-10-CM

## 2023-07-25 DIAGNOSIS — D89.813 GVHD AS COMPLICATION OF BONE MARROW TRANSPLANT (H): Primary | ICD-10-CM

## 2023-07-25 DIAGNOSIS — D89.813 GVHD AS COMPLICATION OF BONE MARROW TRANSPLANT (H): ICD-10-CM

## 2023-07-25 PROCEDURE — 94642 AEROSOL INHALATION TREATMENT: CPT | Performed by: INTERNAL MEDICINE

## 2023-07-25 PROCEDURE — 94640 AIRWAY INHALATION TREATMENT: CPT | Performed by: INTERNAL MEDICINE

## 2023-07-25 RX ORDER — PENTAMIDINE ISETHIONATE 300 MG/300MG
300 INHALANT RESPIRATORY (INHALATION)
Status: CANCELLED | OUTPATIENT
Start: 2023-07-25

## 2023-07-25 RX ORDER — FLUOROMETHOLONE 0.1 %
SUSPENSION, DROPS(FINAL DOSAGE FORM)(ML) OPHTHALMIC (EYE)
Qty: 5 ML | OUTPATIENT
Start: 2023-07-25

## 2023-07-25 RX ORDER — ALBUTEROL SULFATE 0.83 MG/ML
2.5 SOLUTION RESPIRATORY (INHALATION) EVERY 4 HOURS PRN
Status: CANCELLED | OUTPATIENT
Start: 2023-07-25

## 2023-07-25 RX ORDER — PENTAMIDINE ISETHIONATE 300 MG/300MG
300 INHALANT RESPIRATORY (INHALATION)
Status: DISCONTINUED | OUTPATIENT
Start: 2023-07-25 | End: 2023-07-25 | Stop reason: HOSPADM

## 2023-07-25 RX ORDER — OXYCODONE HYDROCHLORIDE 5 MG/1
5-10 TABLET ORAL 2 TIMES DAILY PRN
Qty: 30 TABLET | Refills: 0 | Status: SHIPPED | OUTPATIENT
Start: 2023-07-25 | End: 2023-08-23

## 2023-07-25 RX ADMIN — PENTAMIDINE ISETHIONATE 300 MG: 300 INHALANT RESPIRATORY (INHALATION) at 12:44

## 2023-07-25 NOTE — TELEPHONE ENCOUNTER
The note from 12/1/22 stated he would need follow up in 2 weeks and he has not had a visit since. He will need to have an appointment with Dr. Juarez before the medication can be refilled.     Alec Michael MD  Resident Physician, PGY-2  Department of Ophthalmology

## 2023-07-25 NOTE — TELEPHONE ENCOUNTER
Patient requesting refill of: Oxycodone 5mg  To be sent to: [facility] [place].    Natty Tao RN  [facility]  [phone]

## 2023-07-25 NOTE — TELEPHONE ENCOUNTER
Received refill request for:     oxyCODONE (ROXICODONE) 5 MG tablet     Last dispensed from pharmacy on 6/15/2023.    Patient's last office/virtual visit by prescribing provider on 6/15/2023.    Next office/virtual appointment scheduled for NONE    Last urine drug screen date 5/12/2023.    Current opioid agreement on file? NO     E-prescribe to:     [facility] - [facility] [address] [facility]    Will route to [facility] for review and preparation of prescription(s).

## 2023-07-25 NOTE — PROGRESS NOTES
Nik MCDONALD Victorianocolt was seen today for a Pentamidine nebulizer tx ordered by Dr. Avila Tobar.    Patient was first given 4 puffs of albuterol MDI, after which Pentamidine 300 mg (Lot # [id number]) mixed with 6cc Sterile H20 was administered through a filtered nebulizer.    Pre-treatment: SpO2 = 97%   HR = 71   BBS = Clear   Post-treatment: SpO2 = 99%  HR = 69  BBS = Clear    No adverse side effects noted by the patient.    This service today was provided under Dr. Oden, who was available if needed.     Procedure was completed by Chapo Geller.

## 2023-07-25 NOTE — TELEPHONE ENCOUNTER
Medication refill information reviewed.     Due date for oxyCODONE (ROXICODONE) 5 MG tablet  was 7/15/2023 per last dispense date.      CSA 5/12/2023  Prescriptions prepped for review.     Will route to provider.     Natty Tao RN  [facility]  [phone]

## 2023-07-27 RX ORDER — FLUOROMETHOLONE 0.1 %
1 SUSPENSION, DROPS(FINAL DOSAGE FORM)(ML) OPHTHALMIC (EYE) DAILY
Qty: 5 ML | Refills: 3 | Status: SHIPPED | OUTPATIENT
Start: 2023-07-27 | End: 2023-12-12

## 2023-07-27 NOTE — TELEPHONE ENCOUNTER
Spoke to pt at 1300    Reviewed our epic system if go to ophthalmology notes only does not show optometry visit thru department -- may have been case with ophthalmology resident reviewing refill request.    Rx for FML sent to pharmacy-- pt to use daily and f/u in 6 months following May visit with Dr. Larry Mireles, RN 1:06 PM 07/27/23

## 2023-07-27 NOTE — TELEPHONE ENCOUNTER
[facility] Call Center    Phone Message    May a detailed message be left on voicemail: yes     Reason for Call: Other: Pt calling in to check on refill, the note below states pt has not been seen since December of 2022 which is incorrect pt was seen 05/06/2023 and is not expected to follow up for 6 months. Pt requesting refill to be granted ASAP      Action Taken: Other: ASAP    Travel Screening: Not Applicable

## 2023-08-04 ENCOUNTER — ANESTHESIA EVENT (OUTPATIENT)
Dept: SURGERY | Facility: AMBULATORY SURGERY CENTER | Age: 63
End: 2023-08-04
Payer: COMMERCIAL

## 2023-08-04 NOTE — ANESTHESIA PREPROCEDURE EVALUATION
Anesthesia Pre-Procedure Evaluation    Patient: Nik Nava   MRN: [id number] : 1960        Procedure : Procedure(s):  Bone marrow biopsy, bone specimen, needle/trocar          Past Medical History:   Diagnosis Date    ALL (acute lymphocytic leukemia) (H)     CKD (chronic kidney disease)     GVHD (graft versus host disease) (H)     H/O peripheral stem cell transplant (H)     Hyperthyroidism     Infection due to 2019 novel coronavirus 2023    Influenza A 2022    Postablative hypothyroidism     Pulmonary embolism (H)     Renal cell carcinoma (H)     right kidney    Sleep apnea       Past Surgical History:   Procedure Laterality Date    BACK SURGERY      BONE MARROW BIOPSY, BONE SPECIMEN, NEEDLE/TROCAR Left 2021    Procedure: BIOPSY, BONE MARROW;  Surgeon: Jailyn Ny APRN CNP;  Location: [facility] OR    BONE MARROW BIOPSY, BONE SPECIMEN, NEEDLE/TROCAR Left 11/15/2021    Procedure: BIOPSY, BONE MARROW;  Surgeon: Socrates De La Torre;  Location: [facility] OR    BONE MARROW BIOPSY, BONE SPECIMEN, NEEDLE/TROCAR Left 2022    Procedure: BIOPSY, BONE MARROW;  Surgeon: Renetta Wiggins PA-C;  Location: [facility] OR    BONE MARROW BIOPSY, BONE SPECIMEN, NEEDLE/TROCAR Left 2022    Procedure: BIOPSY, BONE MARROW;  Surgeon: Lorrie Yap PA-C;  Location: [facility] OR    BRONCHOSCOPY (RIGID OR FLEXIBLE), DIAGNOSTIC N/A 2022    Procedure: BRONCHOSCOPY, WITH BRONCHOALVEOLAR LAVAGE;  Surgeon: Murtaza Garcia MD;  [facility]: [facility]    IR CVC TUNNEL PLACEMENT > 5 YRS OF AGE  2021    IR CVC TUNNEL REMOVAL LEFT  2021    IR LIVER BIOPSY PERCUTANEOUS  2022    NEPHRECTOMY  2007    PERCUTANEOUS BIOPSY LIVER N/A 2022    Procedure: NEEDLE BIOPSY, LIVER, PERCUTANEOUS;  Surgeon: Trace Castellanos MD;  Location: [facility] OR      Allergies   Allergen Reactions    Sulfamethoxazole-Trimethoprim Rash      Social History     Tobacco Use    Smoking status: Former      Packs/day: 2.00     Years: 30.00     Pack years: 60.00     Types: Cigarettes     Quit date: 2019     Years since quittin.2    Smokeless tobacco: Never   Substance Use Topics    Alcohol use: Not Currently      Wt Readings from Last 1 Encounters:   23 108.9 kg (240 lb)           Physical Exam    Airway  airway exam normal      Mallampati: II   TM distance: > 3 FB   Neck ROM: full   Mouth opening: > 3 cm    Respiratory Devices and Support         Dental  no notable dental history         Cardiovascular   cardiovascular exam normal          Pulmonary   pulmonary exam normal                OUTSIDE LABS:  CBC:   Lab Results   Component Value Date    WBC 9.1 2023    WBC 10.1 2023    HGB 12.2 (L) 2023    HGB 11.7 (L) 2023    HCT 36.5 (L) 2023    HCT 34.8 (L) 2023     2023     2023     BMP:   Lab Results   Component Value Date     2023     2023    POTASSIUM 4.1 2023    POTASSIUM 5.1 2023    CHLORIDE 100 2023    CHLORIDE 102 2023    CO2 27 2023    CO2 28 2023    BUN 34.5 (H) 2023    BUN 46.2 (H) 2023    CR 1.26 (H) 2023    CR 1.10 2023    GLC 98 2023     (H) 2023     COAGS:   Lab Results   Component Value Date    PTT 22 2022    INR 0.90 2022     POC: No results found for: BGM, HCG, HCGS  HEPATIC:   Lab Results   Component Value Date    ALBUMIN 4.0 2023    PROTTOTAL 6.4 2023    ALT 19 2023    AST 28 2023    ALKPHOS 117 2023    BILITOTAL 0.7 2023     OTHER:   Lab Results   Component Value Date    LACT 0.6 (L) 2021    DAMON 9.7 2023    PHOS 3.1 2023    MAG 2.1 2023    AMYLASE 85 2022    TSH 0.15 (L) 2023    T4 1.55 2023    T3 106 2023    CRP 4.5 2021    SED 56 (H) 2021       Anesthesia Plan    ASA Status:  3    NPO Status:  NPO Appropriate     Anesthesia Type: MAC.     - Reason for MAC: immobility needed, straight local not clinically adequate   Induction: Intravenous.   Maintenance: TIVA.        Consents    Anesthesia Plan(s) and associated risks, benefits, and realistic alternatives discussed. Questions answered and patient/representative(s) expressed understanding.     - Discussed:     - Discussed with:  Patient      - Extended Intubation/Ventilatory Support Discussed: No.      - Patient is DNR/DNI Status: No     Use of blood products discussed: No .     Postoperative Care    Pain management: IV analgesics, Oral pain medications, Multi-modal analgesia.   PONV prophylaxis: Ondansetron (or other 5HT-3), Background Propofol Infusion     Comments:                Avel Kim MD

## 2023-08-06 ENCOUNTER — TELEPHONE (OUTPATIENT)
Dept: NEPHROLOGY | Facility: CLINIC | Age: 63
End: 2023-08-06
Payer: MEDICARE

## 2023-08-06 NOTE — TELEPHONE ENCOUNTER
Patient Contacted for the patient to call back and schedule the following:    Appointment type: return neph onc  Provider: Keegan Chew  Return date: Disposition: Return in about 6 months (around 12/13/2023) for Follow up.   Specialty phone number: [phone]  Additional appointment(s) needed: lab  Additonal Notes: F/U and labs scheduled

## 2023-08-07 ENCOUNTER — ONCOLOGY VISIT (OUTPATIENT)
Dept: TRANSPLANT | Facility: CLINIC | Age: 63
End: 2023-08-07
Attending: PHYSICIAN ASSISTANT
Payer: COMMERCIAL

## 2023-08-07 ENCOUNTER — HOSPITAL ENCOUNTER (OUTPATIENT)
Facility: AMBULATORY SURGERY CENTER | Age: 63
Discharge: HOME | End: 2023-08-07
Attending: PHYSICIAN ASSISTANT
Payer: COMMERCIAL

## 2023-08-07 ENCOUNTER — ANESTHESIA (OUTPATIENT)
Dept: SURGERY | Facility: AMBULATORY SURGERY CENTER | Age: 63
End: 2023-08-07
Payer: COMMERCIAL

## 2023-08-07 ENCOUNTER — LAB (OUTPATIENT)
Dept: LAB | Facility: CLINIC | Age: 63
End: 2023-08-07
Attending: PHYSICIAN ASSISTANT
Payer: COMMERCIAL

## 2023-08-07 VITALS
DIASTOLIC BLOOD PRESSURE: 80 MMHG | RESPIRATION RATE: 18 BRPM | HEART RATE: 66 BPM | BODY MASS INDEX: 31.53 KG/M2 | WEIGHT: 239 LBS | TEMPERATURE: 98.3 F | OXYGEN SATURATION: 97 % | SYSTOLIC BLOOD PRESSURE: 129 MMHG

## 2023-08-07 VITALS — HEART RATE: 68 BPM

## 2023-08-07 VITALS
OXYGEN SATURATION: 96 % | BODY MASS INDEX: 31.68 KG/M2 | SYSTOLIC BLOOD PRESSURE: 121 MMHG | HEIGHT: 73 IN | WEIGHT: 239 LBS | RESPIRATION RATE: 16 BRPM | DIASTOLIC BLOOD PRESSURE: 64 MMHG | TEMPERATURE: 96.8 F | HEART RATE: 71 BPM

## 2023-08-07 DIAGNOSIS — Z94.81 STATUS POST BONE MARROW TRANSPLANT (H): Primary | ICD-10-CM

## 2023-08-07 DIAGNOSIS — C91.01 ACUTE LYMPHOBLASTIC LEUKEMIA (ALL) IN REMISSION (H): Primary | ICD-10-CM

## 2023-08-07 LAB
ALBUMIN SERPL BCG-MCNC: 4 G/DL (ref 3.5–5.2)
ALP SERPL-CCNC: 108 U/L (ref 40–129)
ALT SERPL W P-5'-P-CCNC: 21 U/L (ref 0–70)
ANION GAP SERPL CALCULATED.3IONS-SCNC: 10 MMOL/L (ref 7–15)
AST SERPL W P-5'-P-CCNC: 30 U/L (ref 0–45)
BASOPHILS # BLD AUTO: 0 10E3/UL (ref 0–0.2)
BASOPHILS NFR BLD AUTO: 0 %
BILIRUB SERPL-MCNC: 0.8 MG/DL
BUN SERPL-MCNC: 35.2 MG/DL (ref 8–23)
CALCIUM SERPL-MCNC: 10.1 MG/DL (ref 8.8–10.2)
CHLORIDE SERPL-SCNC: 104 MMOL/L (ref 98–107)
CREAT SERPL-MCNC: 1.07 MG/DL (ref 0.67–1.17)
DEPRECATED HCO3 PLAS-SCNC: 27 MMOL/L (ref 22–29)
EOSINOPHIL # BLD AUTO: 0 10E3/UL (ref 0–0.7)
EOSINOPHIL NFR BLD AUTO: 1 %
ERYTHROCYTE [DISTWIDTH] IN BLOOD BY AUTOMATED COUNT: 16.1 % (ref 10–15)
GFR SERPL CREATININE-BSD FRML MDRD: 77 ML/MIN/1.73M2
GLUCOSE SERPL-MCNC: 98 MG/DL (ref 70–99)
HCT VFR BLD AUTO: 35.3 % (ref 40–53)
HGB BLD-MCNC: 12.1 G/DL (ref 13.3–17.7)
IMM GRANULOCYTES # BLD: 0 10E3/UL
IMM GRANULOCYTES NFR BLD: 1 %
LAB DIRECTOR DISCLAIMER: NORMAL
LAB DIRECTOR INTERPRETATION: NORMAL
LAB DIRECTOR METHODOLOGY: NORMAL
LAB DIRECTOR RESULTS: NORMAL
LYMPHOCYTES # BLD AUTO: 2.3 10E3/UL (ref 0.8–5.3)
LYMPHOCYTES NFR BLD AUTO: 36 %
MCH RBC QN AUTO: 33.7 PG (ref 26.5–33)
MCHC RBC AUTO-ENTMCNC: 34.3 G/DL (ref 31.5–36.5)
MCV RBC AUTO: 98 FL (ref 78–100)
MONOCYTES # BLD AUTO: 0.8 10E3/UL (ref 0–1.3)
MONOCYTES NFR BLD AUTO: 13 %
NEUTROPHILS # BLD AUTO: 3.2 10E3/UL (ref 1.6–8.3)
NEUTROPHILS NFR BLD AUTO: 49 %
NRBC # BLD AUTO: 0 10E3/UL
NRBC BLD AUTO-RTO: 0 /100
PLATELET # BLD AUTO: 134 10E3/UL (ref 150–450)
POTASSIUM SERPL-SCNC: 4.3 MMOL/L (ref 3.4–5.3)
PROT SERPL-MCNC: 6.4 G/DL (ref 6.4–8.3)
RBC # BLD AUTO: 3.59 10E6/UL (ref 4.4–5.9)
SODIUM SERPL-SCNC: 141 MMOL/L (ref 136–145)
SPECIMEN DESCRIPTION: NORMAL
WBC # BLD AUTO: 6.5 10E3/UL (ref 4–11)

## 2023-08-07 PROCEDURE — 88185 FLOWCYTOMETRY/TC ADD-ON: CPT | Performed by: INTERNAL MEDICINE

## 2023-08-07 PROCEDURE — 88305 TISSUE EXAM BY PATHOLOGIST: CPT | Mod: TC | Performed by: INTERNAL MEDICINE

## 2023-08-07 PROCEDURE — 80053 COMPREHEN METABOLIC PANEL: CPT | Performed by: INTERNAL MEDICINE

## 2023-08-07 PROCEDURE — G0452 MOLECULAR PATHOLOGY INTERPR: HCPCS | Mod: 26 | Performed by: PATHOLOGY

## 2023-08-07 PROCEDURE — 88291 CYTO/MOLECULAR REPORT: CPT | Mod: 26 | Performed by: MEDICAL GENETICS

## 2023-08-07 PROCEDURE — 88237 TISSUE CULTURE BONE MARROW: CPT | Performed by: INTERNAL MEDICINE

## 2023-08-07 PROCEDURE — 250N000011 HC RX IP 250 OP 636: Mod: JZ | Performed by: PHYSICIAN ASSISTANT

## 2023-08-07 PROCEDURE — 88341 IMHCHEM/IMCYTCHM EA ADD ANTB: CPT | Mod: 26 | Performed by: PATHOLOGY

## 2023-08-07 PROCEDURE — 36591 DRAW BLOOD OFF VENOUS DEVICE: CPT

## 2023-08-07 PROCEDURE — 88342 IMHCHEM/IMCYTCHM 1ST ANTB: CPT | Mod: 26 | Performed by: PATHOLOGY

## 2023-08-07 PROCEDURE — 88311 DECALCIFY TISSUE: CPT | Mod: TC | Performed by: INTERNAL MEDICINE

## 2023-08-07 PROCEDURE — 88188 FLOWCYTOMETRY/READ 9-15: CPT | Mod: GC | Performed by: PATHOLOGY

## 2023-08-07 PROCEDURE — 81267 CHIMERISM ANAL NO CELL SELEC: CPT | Performed by: INTERNAL MEDICINE

## 2023-08-07 PROCEDURE — 88275 CYTOGENETICS 100-300: CPT | Performed by: INTERNAL MEDICINE

## 2023-08-07 PROCEDURE — 85097 BONE MARROW INTERPRETATION: CPT | Mod: GC | Performed by: PATHOLOGY

## 2023-08-07 PROCEDURE — 88368 INSITU HYBRIDIZATION MANUAL: CPT | Mod: 26 | Performed by: MEDICAL GENETICS

## 2023-08-07 PROCEDURE — 85025 COMPLETE CBC W/AUTO DIFF WBC: CPT | Performed by: INTERNAL MEDICINE

## 2023-08-07 PROCEDURE — 88264 CHROMOSOME ANALYSIS 20-25: CPT | Performed by: INTERNAL MEDICINE

## 2023-08-07 PROCEDURE — 88311 DECALCIFY TISSUE: CPT | Mod: 26 | Performed by: PATHOLOGY

## 2023-08-07 PROCEDURE — 38222 DX BONE MARROW BX & ASPIR: CPT | Performed by: PHYSICIAN ASSISTANT

## 2023-08-07 PROCEDURE — 88184 FLOWCYTOMETRY/ TC 1 MARKER: CPT | Performed by: INTERNAL MEDICINE

## 2023-08-07 PROCEDURE — 88305 TISSUE EXAM BY PATHOLOGIST: CPT | Mod: 26 | Performed by: PATHOLOGY

## 2023-08-07 RX ORDER — SODIUM CHLORIDE, SODIUM LACTATE, POTASSIUM CHLORIDE, CALCIUM CHLORIDE 600; 310; 30; 20 MG/100ML; MG/100ML; MG/100ML; MG/100ML
INJECTION, SOLUTION INTRAVENOUS CONTINUOUS
Status: DISCONTINUED | OUTPATIENT
Start: 2023-08-07 | End: 2023-08-08 | Stop reason: HOSPADM

## 2023-08-07 RX ORDER — HYDROMORPHONE HYDROCHLORIDE 1 MG/ML
0.4 INJECTION, SOLUTION INTRAMUSCULAR; INTRAVENOUS; SUBCUTANEOUS EVERY 5 MIN PRN
Status: DISCONTINUED | OUTPATIENT
Start: 2023-08-07 | End: 2023-08-08 | Stop reason: HOSPADM

## 2023-08-07 RX ORDER — PROPOFOL 10 MG/ML
INJECTION, EMULSION INTRAVENOUS PRN
Status: DISCONTINUED | OUTPATIENT
Start: 2023-08-07 | End: 2023-08-07

## 2023-08-07 RX ORDER — OXYCODONE HYDROCHLORIDE 5 MG/1
5 TABLET ORAL
Status: DISCONTINUED | OUTPATIENT
Start: 2023-08-07 | End: 2023-08-08 | Stop reason: HOSPADM

## 2023-08-07 RX ORDER — HYDROMORPHONE HYDROCHLORIDE 1 MG/ML
0.2 INJECTION, SOLUTION INTRAMUSCULAR; INTRAVENOUS; SUBCUTANEOUS EVERY 5 MIN PRN
Status: DISCONTINUED | OUTPATIENT
Start: 2023-08-07 | End: 2023-08-08 | Stop reason: HOSPADM

## 2023-08-07 RX ORDER — ONDANSETRON 2 MG/ML
4 INJECTION INTRAMUSCULAR; INTRAVENOUS EVERY 30 MIN PRN
Status: DISCONTINUED | OUTPATIENT
Start: 2023-08-07 | End: 2023-08-08 | Stop reason: HOSPADM

## 2023-08-07 RX ORDER — LIDOCAINE 40 MG/G
CREAM TOPICAL
Status: DISCONTINUED | OUTPATIENT
Start: 2023-08-07 | End: 2023-08-08 | Stop reason: HOSPADM

## 2023-08-07 RX ORDER — ACETAMINOPHEN 325 MG/1
975 TABLET ORAL ONCE
Status: COMPLETED | OUTPATIENT
Start: 2023-08-07 | End: 2023-08-07

## 2023-08-07 RX ORDER — ONDANSETRON 4 MG/1
4 TABLET, ORALLY DISINTEGRATING ORAL EVERY 30 MIN PRN
Status: DISCONTINUED | OUTPATIENT
Start: 2023-08-07 | End: 2023-08-08 | Stop reason: HOSPADM

## 2023-08-07 RX ORDER — FENTANYL CITRATE 50 UG/ML
50 INJECTION, SOLUTION INTRAMUSCULAR; INTRAVENOUS EVERY 5 MIN PRN
Status: DISCONTINUED | OUTPATIENT
Start: 2023-08-07 | End: 2023-08-08 | Stop reason: HOSPADM

## 2023-08-07 RX ORDER — HEPARIN SODIUM (PORCINE) LOCK FLUSH IV SOLN 100 UNIT/ML 100 UNIT/ML
5 SOLUTION INTRAVENOUS ONCE
Status: COMPLETED | OUTPATIENT
Start: 2023-08-07 | End: 2023-08-07

## 2023-08-07 RX ORDER — SODIUM CHLORIDE, SODIUM LACTATE, POTASSIUM CHLORIDE, CALCIUM CHLORIDE 600; 310; 30; 20 MG/100ML; MG/100ML; MG/100ML; MG/100ML
INJECTION, SOLUTION INTRAVENOUS CONTINUOUS PRN
Status: DISCONTINUED | OUTPATIENT
Start: 2023-08-07 | End: 2023-08-07

## 2023-08-07 RX ORDER — PROPOFOL 10 MG/ML
INJECTION, EMULSION INTRAVENOUS CONTINUOUS PRN
Status: DISCONTINUED | OUTPATIENT
Start: 2023-08-07 | End: 2023-08-07

## 2023-08-07 RX ORDER — HEPARIN SODIUM (PORCINE) LOCK FLUSH IV SOLN 100 UNIT/ML 100 UNIT/ML
5-10 SOLUTION INTRAVENOUS
Status: DISCONTINUED | OUTPATIENT
Start: 2023-08-07 | End: 2023-08-08 | Stop reason: HOSPADM

## 2023-08-07 RX ORDER — LIDOCAINE HYDROCHLORIDE 10 MG/ML
8-10 INJECTION, SOLUTION EPIDURAL; INFILTRATION; INTRACAUDAL; PERINEURAL
Status: DISCONTINUED | OUTPATIENT
Start: 2023-08-07 | End: 2023-08-08 | Stop reason: HOSPADM

## 2023-08-07 RX ORDER — HEPARIN SODIUM,PORCINE 10 UNIT/ML
5-10 VIAL (ML) INTRAVENOUS EVERY 24 HOURS
Status: DISCONTINUED | OUTPATIENT
Start: 2023-08-07 | End: 2023-08-08 | Stop reason: HOSPADM

## 2023-08-07 RX ORDER — FENTANYL CITRATE 50 UG/ML
25 INJECTION, SOLUTION INTRAMUSCULAR; INTRAVENOUS EVERY 5 MIN PRN
Status: DISCONTINUED | OUTPATIENT
Start: 2023-08-07 | End: 2023-08-08 | Stop reason: HOSPADM

## 2023-08-07 RX ORDER — OXYCODONE HYDROCHLORIDE 5 MG/1
10 TABLET ORAL
Status: DISCONTINUED | OUTPATIENT
Start: 2023-08-07 | End: 2023-08-08 | Stop reason: HOSPADM

## 2023-08-07 RX ORDER — HEPARIN SODIUM,PORCINE 10 UNIT/ML
5-10 VIAL (ML) INTRAVENOUS
Status: DISCONTINUED | OUTPATIENT
Start: 2023-08-07 | End: 2023-08-08 | Stop reason: HOSPADM

## 2023-08-07 RX ADMIN — PROPOFOL 50 MG: 10 INJECTION, EMULSION INTRAVENOUS at 11:45

## 2023-08-07 RX ADMIN — SODIUM CHLORIDE, SODIUM LACTATE, POTASSIUM CHLORIDE, CALCIUM CHLORIDE: 600; 310; 30; 20 INJECTION, SOLUTION INTRAVENOUS at 11:39

## 2023-08-07 RX ADMIN — PROPOFOL 150 MCG/KG/MIN: 10 INJECTION, EMULSION INTRAVENOUS at 11:45

## 2023-08-07 RX ADMIN — ACETAMINOPHEN 975 MG: 325 TABLET ORAL at 10:32

## 2023-08-07 RX ADMIN — Medication 5 ML: at 09:07

## 2023-08-07 ASSESSMENT — PAIN SCALES - GENERAL: PAINLEVEL: MILD PAIN (3)

## 2023-08-07 NOTE — DISCHARGE INSTRUCTIONS
How to Care for your Bone Marrow Biopsy    Activity  Relax and take it easy for the next 24 hours.   Resume regular activity after 24 hours.    Diet   Resume pre-procedure diet and drink plenty of fluids.    If you received sedation, you may feel a little nauseated so start with a clear liquid diet until the nausea passes.    Do Not Immerse Bone Marrow Biopsy Puncture Site in Water  Do not take a bath until the puncture site has healed.  Do not sit in a hot tub or spa until the puncture site has healed.  Do not swim until the puncture site has healed.  Wait 24 hours before taking a shower.    Drainage  Drainage should be minimal.  IF bleeding should occur and soaks through the dressing, lie down and put pressure on the puncture site.    IF bleeding persists, apply gentle pressure with your hand over the dressing for 5 minutes.    IF the pressure doesn't stop the bleeding, contact your provider immediately.    Dressing  Keep the dressing dry and in place for 24 hours, unless instructed otherwise.    IF bleeding soaks through the dressing in the first 24 hours do NOT remove the dressing as you may pull off any scab that has formed.  Instead, reinforce the dressing with extra gauze and tape.    No Alcohol  Do not drink alcoholic beverages for the next 24 hours.    No Driving or Operating Machinery  No driving or operating machinery for the next 24 hours.    Notify your provider IF:    Excessive bleeding or drainage at the puncture site    Excessive swelling, redness or tenderness at the puncture site    Fever above 100.5 degrees taken orally    Severe pain    Drainage that is green, yellow, thick white or has a bad odor    Telephone Numbers  Bone Marrow transplant clinic:  [phone] (Monday thru Friday, 8:00 am to 4:00 pm)  After business hours call the [facility]:  [phone] and ask for the Hematology/BMT doctor on call.  Or call the Emergency Room at the [facility]  [facility]:  [phone].         [facility]   Adult Discharge Orders & Instructions     For 24 hours after surgery    Get plenty of rest.  A responsible adult must stay with you for at least 24 hours after you leave the hospital.   Do not drive or use heavy equipment.  If you have weakness or tingling, don't drive or use heavy equipment until this feeling goes away.  Do not drink alcohol.  Avoid strenuous or risky activities.  Ask for help when climbing stairs.   You may feel lightheaded.  IF so, sit for a few minutes before standing.  Have someone help you get up.   If you have nausea (feel sick to your stomach): Drink only clear liquids such as apple juice, ginger ale, broth or 7-Up.  Rest may also help.  Be sure to drink enough fluids.  Move to a regular diet as you feel able.  You may have a slight fever. Call the doctor if your fever is over 100 F (37.7 C) (taken under the tongue) or lasts longer than 24 hours.  You may have a dry mouth, a sore throat, muscle aches or trouble sleeping.  These should go away after 24 hours.  Do not make important or legal decisions.   Call your doctor for any of the followin.  Signs of infection (fever, growing tenderness at the surgery site, a large amount of drainage or bleeding, severe pain, foul-smelling drainage, redness, swelling).    2. It has been over 8 to 10 hours since surgery and you are still not able to urinate (pass water).    3.  Headache for over 24 hours.

## 2023-08-07 NOTE — LETTER
8/7/2023         RE: Nik Nava  [id number] [address] [facility] [address]        Dear Colleague,    Thank you for referring your patient, Nik Nava, to the [facility] BLOOD AND MARROW TRANSPLANT PROGRAM [place]. Please see a copy of my visit note below.    Note completed in procedure encounter.       Again, thank you for allowing me to participate in the care of your patient.        Sincerely,        [facility] Advanced Practice Provider

## 2023-08-07 NOTE — ANESTHESIA POSTPROCEDURE EVALUATION
Patient: Nik Nava    Procedure: Procedure(s):  Bone marrow biopsy, bone specimen, needle/trocar       Anesthesia Type:  MAC    Note:  Disposition: Outpatient   Postop Pain Control: Uneventful            Sign Out: Well controlled pain   PONV: No   Neuro/Psych: Uneventful            Sign Out: Acceptable/Baseline neuro status   Airway/Respiratory: Uneventful            Sign Out: Acceptable/Baseline resp. status   CV/Hemodynamics: Uneventful            Sign Out: Acceptable CV status; No obvious hypovolemia; No obvious fluid overload   Other NRE:    DID A NON-ROUTINE EVENT OCCUR?            Last vitals:  Vitals Value Taken Time   /64 08/07/23 1228   Temp 36  C (96.8  F) 08/07/23 1201   Pulse     Resp 16 08/07/23 1228   SpO2 96 % 08/07/23 1228       Electronically Signed By: Avel Kim MD  August 7, 2023  12:35 PM

## 2023-08-07 NOTE — PROCEDURES
"BMT ONC Adult Bone Marrow Biopsy Procedure Note  August 7, 2023  /64   Pulse 71   Temp 96.8  F (36  C) (Temporal)   Resp 16   Ht 1.854 m (6' 1\")   Wt 108.4 kg (239 lb)   SpO2 96%   BMI 31.53 kg/m       Learning needs assessment complete within 12 months? YES    DIAGNOSIS: ALL     PROCEDURE: Unilateral Bone Marrow Biopsy and Unilateral Aspirate    [place] [facility] [place]    Patient s identification was positively verified by patient identification band and invasive procedure safety checklist was completed. Informed consent was obtained. Following the administration of Propofol as pre-medication, patient was placed in the prone position and prepped and draped in a sterile manner. Approximately 10 cc of 1% Lidocaine was used over the left posterior iliac spine. Following this a 3 mm incision was made. Trephine bone marrow core(s) was (were) obtained from the [facility]. Bone marrow aspirates were obtained from the [facility]. Aspirates were sent for morphology, immunophenotyping, cytogenetics, and molecular diagnostics RFLP. A total of approximately 20 ml of marrow was aspirated. Following this procedure a sterile dressing was applied to the bone marrow biopsy site(s). The patient was placed in the supine position to maintain pressure on the biopsy site. Post-procedure wound care instructions were given.     Complications: NO    Interventions: NO    Length of procedure:20 minutes or less      Procedure performed by: Teressa Rick PA-C      "

## 2023-08-07 NOTE — INTERVAL H&P NOTE
"I have reviewed the surgical (or preoperative) H&P that is linked to this encounter, and examined the patient. There are no significant changes    Clinical Conditions Present on Arrival:  Clinically Significant Risk Factors Present on Admission                  # Obesity: Estimated body mass index is 31.53 kg/m  as calculated from the following:    Height as of this encounter: 1.854 m (6' 1\").    Weight as of this encounter: 108.4 kg (239 lb).       "

## 2023-08-07 NOTE — PROGRESS NOTES
Patient Name: Nik Nava  Patient MRN: [id number]  Patient : 1960    Abbreviated H&P and Pre-sedation Assessment for bmbx (procedure name) with sedation    Chief complaint and/or reason for Procedure: ALL    Planned level of sedation: Minimal - moderate sedation    History of problems with sedation: (patient or family hx): No    ASA Assessment Category: 2 - Mild systemic disease    History of sleep apnea: No    History of blood thinners: No     Appropriate NPO status: Yes    Current tobacco use: No    Any recent fever, cough, chest or sinus congestion, SOB, weight loss, chest pain, diarrhea or constipation. No    Medications   Currently Scheduled Medications       Home Med List)  (Not in a hospital admission)      Allergies  Sulfamethoxazole-trimethoprim    PMH:  Past Medical History:   Diagnosis Date    ALL (acute lymphocytic leukemia) (H)     CKD (chronic kidney disease)     GVHD (graft versus host disease) (H)     H/O peripheral stem cell transplant (H)     Hyperthyroidism     Infection due to 2019 novel coronavirus 2023    Influenza A 2022    Postablative hypothyroidism     Pulmonary embolism (H)     Renal cell carcinoma (H)     right kidney    Sleep apnea        Past Surgical History:   Procedure Laterality Date    BACK SURGERY      BONE MARROW BIOPSY, BONE SPECIMEN, NEEDLE/TROCAR Left 2021    Procedure: BIOPSY, BONE MARROW;  Surgeon: Jailyn Ny APRN CNP;  Location: [facility] OR    BONE MARROW BIOPSY, BONE SPECIMEN, NEEDLE/TROCAR Left 11/15/2021    Procedure: BIOPSY, BONE MARROW;  Surgeon: Socrates De La Torre;  Location: [facility] OR    BONE MARROW BIOPSY, BONE SPECIMEN, NEEDLE/TROCAR Left 2022    Procedure: BIOPSY, BONE MARROW;  Surgeon: Renetta Wiggins PA-C;  Location: [facility] OR    BONE MARROW BIOPSY, BONE SPECIMEN, NEEDLE/TROCAR Left 2022    Procedure: BIOPSY, BONE MARROW;  Surgeon: Lorrie Yap PA-C;  Location: [facility] OR    BRONCHOSCOPY (RIGID  OR FLEXIBLE), DIAGNOSTIC N/A 04/29/2022    Procedure: BRONCHOSCOPY, WITH BRONCHOALVEOLAR LAVAGE;  Surgeon: Murtaza Garcia MD;  Location: [facility]    IR CVC TUNNEL PLACEMENT > 5 YRS OF AGE  08/04/2021    IR CVC TUNNEL REMOVAL LEFT  09/02/2021    IR LIVER BIOPSY PERCUTANEOUS  02/21/2022    NEPHRECTOMY  2007    PERCUTANEOUS BIOPSY LIVER N/A 02/21/2022    Procedure: NEEDLE BIOPSY, LIVER, PERCUTANEOUS;  Surgeon: Trace Castellanos MD;  Location: [facility]       Focused Physical exam pertinent to procedure:          (Details of heart, lung, ASA assessment and mallampati assessment in pre procedure assessment flowsheet)  General- healthy,alert,no distress   Recent vital signs-  /80 (BP Location: Right arm, Patient Position: Sitting, Cuff Size: Adult Regular)   Pulse 66   Temp 98.3  F (36.8  C) (Oral)   Resp 18   Wt 108.4 kg (239 lb)   SpO2 97%   BMI 31.53 kg/m    HEART-regular rate and rhythm and no murmurs, gallops, or rub  LUNGS-Clear to Ausculation  OROPHARYNGEAL - MALLAMPATTI- Class III (visualization of the soft palate and base of uvula)    A/P:Reviewed history, medications, allergies, clinical information and pre procedure assessment. The patient was informed of the risks and benefits of the procedure.  They would like to proceed.  Nik Nava is approved for use of sedation during their procedure as noted above.      MD signature  Alba Mims, APRN CNP

## 2023-08-07 NOTE — NURSING NOTE
Chief Complaint   Patient presents with    Port Draw     Labs drawn via port by RN in lab. VS Taken.      Port accessed and labs drawn by RN.\ Pt tolerated well.  VS taken.  Pt checked in for next appt.    Tamie Pruett RN

## 2023-08-07 NOTE — H&P (VIEW-ONLY)
Patient Name: Nik Nava  Patient MRN: [id number]  Patient : 1960    Abbreviated H&P and Pre-sedation Assessment for bmbx (procedure name) with sedation    Chief complaint and/or reason for Procedure: ALL    Planned level of sedation: Minimal - moderate sedation    History of problems with sedation: (patient or family hx): No    ASA Assessment Category: 2 - Mild systemic disease    History of sleep apnea: No    History of blood thinners: No     Appropriate NPO status: Yes    Current tobacco use: No    Any recent fever, cough, chest or sinus congestion, SOB, weight loss, chest pain, diarrhea or constipation. No    Medications   Currently Scheduled Medications       Home Med List)  (Not in a hospital admission)      Allergies  Sulfamethoxazole-trimethoprim    PMH:  Past Medical History:   Diagnosis Date    ALL (acute lymphocytic leukemia) (H)     CKD (chronic kidney disease)     GVHD (graft versus host disease) (H)     H/O peripheral stem cell transplant (H)     Hyperthyroidism     Infection due to 2019 novel coronavirus 2023    Influenza A 2022    Postablative hypothyroidism     Pulmonary embolism (H)     Renal cell carcinoma (H)     right kidney    Sleep apnea        Past Surgical History:   Procedure Laterality Date    BACK SURGERY      BONE MARROW BIOPSY, BONE SPECIMEN, NEEDLE/TROCAR Left 2021    Procedure: BIOPSY, BONE MARROW;  Surgeon: Jailyn Ny APRN CNP;  Location: [facility]    BONE MARROW BIOPSY, BONE SPECIMEN, NEEDLE/TROCAR Left 11/15/2021    Procedure: BIOPSY, BONE MARROW;  Surgeon: Socrates De La Torre;  Location: [facility]    BONE MARROW BIOPSY, BONE SPECIMEN, NEEDLE/TROCAR Left 2022    Procedure: BIOPSY, BONE MARROW;  Surgeon: Renetta Wiggins PA-C;  Location: [facility] OR    BONE MARROW BIOPSY, BONE SPECIMEN, NEEDLE/TROCAR Left 2022    Procedure: BIOPSY, BONE MARROW;  Surgeon: Lorrie Yap PA-C;  Location: [facility] OR    BRONCHOSCOPY (RIGID  OR FLEXIBLE), DIAGNOSTIC N/A 04/29/2022    Procedure: BRONCHOSCOPY, WITH BRONCHOALVEOLAR LAVAGE;  Surgeon: Murtaza Garcia MD;  Location: [facility]    IR CVC TUNNEL PLACEMENT > 5 YRS OF AGE  08/04/2021    IR CVC TUNNEL REMOVAL LEFT  09/02/2021    IR LIVER BIOPSY PERCUTANEOUS  02/21/2022    NEPHRECTOMY  2007    PERCUTANEOUS BIOPSY LIVER N/A 02/21/2022    Procedure: NEEDLE BIOPSY, LIVER, PERCUTANEOUS;  Surgeon: Trace Castellanos MD;  [place] [facility] [place]       Focused Physical exam pertinent to procedure:          (Details of heart, lung, ASA assessment and mallampati assessment in pre procedure assessment flowsheet)  General- healthy,alert,no distress   Recent vital signs-  /80 (BP Location: Right arm, Patient Position: Sitting, Cuff Size: Adult Regular)   Pulse 66   Temp 98.3  F (36.8  C) (Oral)   Resp 18   Wt 108.4 kg (239 lb)   SpO2 97%   BMI 31.53 kg/m    HEART-regular rate and rhythm and no murmurs, gallops, or rub  LUNGS-Clear to Ausculation  OROPHARYNGEAL - MALLAMPATTI- Class III (visualization of the soft palate and base of uvula)    A/P:Reviewed history, medications, allergies, clinical information and pre procedure assessment. The patient was informed of the risks and benefits of the procedure.  They would like to proceed.  Nik Nava is approved for use of sedation during their procedure as noted above.      MD signature  Alba Mims, APRN CNP

## 2023-08-07 NOTE — ANESTHESIA CARE TRANSFER NOTE
Patient: Nik Nava    Procedure: Procedure(s):  Bone marrow biopsy, bone specimen, needle/trocar       Diagnosis: 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) [C91.01]  Diagnosis Additional Information: No value filed.    Anesthesia Type:   MAC     Note:    Oropharynx: spontaneously breathing  Level of Consciousness: awake  Oxygen Supplementation: room air    Independent Airway: airway patency satisfactory and stable  Dentition: dentition unchanged  Vital Signs Stable: post-procedure vital signs reviewed and stable  Report to RN Given: handoff report given  Patient transferred to: Phase II    Handoff Report: Identifed the Patient, Identified the Reponsible Provider, Reviewed the pertinent medical history, Discussed the surgical course, Reviewed Intra-OP anesthesia mangement and issues during anesthesia, Set expectations for post-procedure period and Allowed opportunity for questions and acknowledgement of understanding      Vitals:  Vitals Value Taken Time   /63 08/07/23 1201   Temp 36  C (96.8  F) 08/07/23 1201   Pulse 68    Resp 16 08/07/23 1201   SpO2 95 % 08/07/23 1201       Electronically Signed By: PEE Jacinto CRNA  August 7, 2023  12:03 PM

## 2023-08-07 NOTE — LETTER
2023         RE: Nik Nava  [address] [address] [address]        Dear Colleague,    Thank you for referring your patient, Nik Nava, to the [facility] BLOOD AND MARROW TRANSPLANT PROGRAM [place]. Please see a copy of my visit note below.    Patient Name: Nik Nava  Patient MRN: [id number]  Patient : 1960    Abbreviated H&P and Pre-sedation Assessment for bmbx (procedure name) with sedation    Chief complaint and/or reason for Procedure: ALL    Planned level of sedation: Minimal - moderate sedation    History of problems with sedation: (patient or family hx): No    ASA Assessment Category: 2 - Mild systemic disease    History of sleep apnea: No    History of blood thinners: No     Appropriate NPO status: Yes    Current tobacco use: No    Any recent fever, cough, chest or sinus congestion, SOB, weight loss, chest pain, diarrhea or constipation. No    Medications   Currently Scheduled Medications       Home Med List)  (Not in a hospital admission)      Allergies  Sulfamethoxazole-trimethoprim    PMH:  Past Medical History:   Diagnosis Date    ALL (acute lymphocytic leukemia) (H)     CKD (chronic kidney disease)     GVHD (graft versus host disease) (H)     H/O peripheral stem cell transplant (H)     Hyperthyroidism     Infection due to 2019 novel coronavirus 2023    Influenza A 2022    Postablative hypothyroidism     Pulmonary embolism (H)     Renal cell carcinoma (H)     right kidney    Sleep apnea        Past Surgical History:   Procedure Laterality Date    BACK SURGERY  2017    BONE MARROW BIOPSY, BONE SPECIMEN, NEEDLE/TROCAR Left 2021    Procedure: BIOPSY, BONE MARROW;  Surgeon: Jailyn Ny APRN CNP;  Location: [facility]    BONE MARROW BIOPSY, BONE SPECIMEN, NEEDLE/TROCAR Left 11/15/2021    Procedure: BIOPSY, BONE MARROW;  Surgeon: Socrates De La Torre;  Location: [facility] OR    BONE MARROW BIOPSY, BONE SPECIMEN, NEEDLE/TROCAR Left  02/07/2022    Procedure: BIOPSY, BONE MARROW;  Surgeon: Renetta Wiggins PA-C;  Location: [facility]    BONE MARROW BIOPSY, BONE SPECIMEN, NEEDLE/TROCAR Left 08/18/2022    Procedure: BIOPSY, BONE MARROW;  Surgeon: Lorrie Yap PA-C;  Location: [facility] OR    BRONCHOSCOPY (RIGID OR FLEXIBLE), DIAGNOSTIC N/A 04/29/2022    Procedure: BRONCHOSCOPY, WITH BRONCHOALVEOLAR LAVAGE;  Surgeon: Murtaza Garcia MD;  [facility]    IR CVC TUNNEL PLACEMENT > 5 YRS OF AGE  08/04/2021    IR CVC TUNNEL REMOVAL LEFT  09/02/2021    IR LIVER BIOPSY PERCUTANEOUS  02/21/2022    NEPHRECTOMY  2007    PERCUTANEOUS BIOPSY LIVER N/A 02/21/2022    Procedure: NEEDLE BIOPSY, LIVER, PERCUTANEOUS;  Surgeon: Trace Castellanos MD;  Location: [facility] OR       Focused Physical exam pertinent to procedure:          (Details of heart, lung, ASA assessment and mallampati assessment in pre procedure assessment flowsheet)  General- healthy,alert,no distress   Recent vital signs-  /80 (BP Location: Right arm, Patient Position: Sitting, Cuff Size: Adult Regular)   Pulse 66   Temp 98.3  F (36.8  C) (Oral)   Resp 18   Wt 108.4 kg (239 lb)   SpO2 97%   BMI 31.53 kg/m    HEART-regular rate and rhythm and no murmurs, gallops, or rub  LUNGS-Clear to Ausculation  OROPHARYNGEAL - MALLAMPATTI- Class III (visualization of the soft palate and base of uvula)    A/P:Reviewed history, medications, allergies, clinical information and pre procedure assessment. The patient was informed of the risks and benefits of the procedure.  They would like to proceed.  Nik Nava is approved for use of sedation during their procedure as noted above.      MD signature  Alba Mims, APRN CNP

## 2023-08-10 DIAGNOSIS — C91.01 ACUTE LYMPHOBLASTIC LEUKEMIA (ALL) IN REMISSION (H): ICD-10-CM

## 2023-08-10 RX ORDER — PREDNISONE 1 MG/1
4 TABLET ORAL DAILY
Qty: 120 TABLET | Refills: 0 | Status: SHIPPED | OUTPATIENT
Start: 2023-08-10 | End: 2023-10-02

## 2023-08-14 NOTE — PROGRESS NOTES
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    HPI:    Nik returns to clinic for follow up. His most active clinical issue currently is his back pain and he is scheduled to see neurosurgery today.  They are contemplating surgery.  He is followed closely for adjusting his pain regimen with PCP locally.  In terms of chronic GVHD symptoms are better controlled since last seen he specifically is using eyedrops 3-4 times a day only down from more than 10 times per day on last follow-up, believes ocular lenses have made a significant difference in his quality of life and ocular symptoms.  No new oral sores or ulcers however he does have persistent mild lichenoid changes with a healed epithelialized ulcer on bilateral buccal mucosa.  Still using Dex 2-3 times a day swish and spit.  No new respiratory symptoms.  No chest pain.  No nausea vomiting or diarrhea.  No bleeding and no headaches.    Review of Systems                                                                                                                                         10 point Review of systems was negative except as detailed above     PHYSICAL EXAM                                                                                                                                                   KPS: 60-70      Wt Readings from Last 4 Encounters:   08/07/23 108.4 kg (239 lb)   08/07/23 108.4 kg (239 lb)   06/13/23 108.9 kg (240 lb)   06/13/23 109.3 kg (241 lb)      General: NAD.  HEENT: sclera anicteric, injected conjunctivae. Mild patchy lichenoid changes in oral mucosa with prominent area of healed in the right/left mid buccal mucosa , no ulcers seen.    Lungs:  CTA b/l  no wheezes  CV: RRR  no gallop  Abd; soft, NABS, protuberant  Ext/ Skin: Skin bruising on bilateral forearms, fainting of previous hyperpigmented areas of previously known  cGVHD  LE 1+ bilateral edema in lower extremities, left>right noted today; stable    cGVHD therapy started on February 24 2022  - Baseline NIH scoring 2/24/2022 : skin 2 maculopapular rash/erythema with no sclerotic features, mouth score 1 mild symptoms with lichenoid changes less than 25%, eyes score 2 moderate symptoms without new visual impairments due to KCS requiring lubricant eyedrops more than 3 times a day, overall mild scale for her provider  - 3/25/2022 1 month NIH treatment assessment on PQRST: skin 2, mouth score 1 mild symptoms with NO lichenoid changes, eyes score 2 moderate symptoms w requiring lubricant eyedrops more than 3 times a day, liver score 1 ALT 3.7x ULN and nl bilirubin   - 3/31  Mouth clear; eyes minimal LFT still abn; no joint or new GI sx  - 4/28 Mouth clear, Left>R eye is mild sclera erythema, lids are pink and irritated appearing, LFT's slow improvement, no new joint, skin, or GI symptoms.   -5/11 mouth with mild erythema but no lichenoid changes, eyes using eyedrops 4-6 times a day score of 2, LFTs significantly improved from baseline slight elevation in alk phos and AST with normal bilirubin, skin with hyperpigmentation from old acute GVHD no active skin rashes, no GI symptoms no musculoskeletal complaints.  -5/26 Mouth with very slight lichenoid changes, erythema.  Eyes still using eyedrops 4-6x per day.  LFTs essentially normal with slight elevation in alk phos.  Skin with hyperpigmentation from old acute GVHD, no active rashes, no GI or MSK concerns.  Skin 0, mouth 0 but with lichenoid changes, eyes 2  -6/7/22 Mouth with no lichenoid changes, erythema.  Eyes still using eyedrops 4-6x per day.  LFTs essentially normal with slight elevation in alk phos.  Skin with hyperpigmentation from old acute GVHD, no active rashes, no GI or MSK concerns.  Skin 0, mouth 0, eyes 2     -6 month PQRST assessment 9/6/2022: eyes 3, mouth 0 for symptoms, 25% lichenoid changes (1), liver/GI/skin  0    11/8/2022, 11/22/2022, 12/13/22 cGVHD NIG scoring eyes 3, no other organ involvement clinically  3/28/23 cGVHD NIG scoring eyes 3, no other organ involvement clinically  5/2/23 cGVHD NIG scoring eyes 3, oral 1, no other organ involvement clinically  6/13/23 cGVHD NIG scoring eyes 3, oral 1, no other organ involvement clinically  8/15/23 cGVHD NIG scoring eyes 3 (down to 3-4 times eye drop from >10 but still using scleral lenses), oral 1, no other organ involvement clinically  Lab:    Lab Results   Component Value Date    WBC 6.5 08/07/2023    ANEU 3.5 10/26/2021    HGB 12.1 (L) 08/07/2023    HCT 35.3 (L) 08/07/2023     (L) 08/07/2023     08/07/2023    POTASSIUM 4.3 08/07/2023    CHLORIDE 104 08/07/2023    CO2 27 08/07/2023    GLC 98 08/07/2023    BUN 35.2 (H) 08/07/2023    CR 1.07 08/07/2023    MAG 2.1 06/13/2023    INR 0.90 12/01/2022    BILITOTAL 0.8 08/07/2023    AST 30 08/07/2023    ALT 21 08/07/2023    ALKPHOS 108 08/07/2023    PROTTOTAL 6.4 08/07/2023    ALBUMIN 4.0 08/07/2023 4/28/2023    MRI Lumbar spine  1.  The previously seen ventral epidural abscess has resolved with small amount of residual ventral epidural phlegmon.   2.  Marrow edema about the L5-S1 endplates has mildly worsened within new rim-enhancing subchondral lesion in the left S1 superior endplate. This could reflect progression of osteomyelitis.      MRI hip right  1.  No evidence of septic arthritis of the right hip joint.   2.  There is degeneration and likely tearing of the right acetabular labrum anterosuperiorly, and probably low-grade chondromalacia of the right hip joint.   3.  Abnormality at L5-S1 compatible with known discitis-osteomyelitis.    ASSESSMENT AND PLAN   Nik Nava is a 63 year old male with Ph Pos ALL, day 734 s/p sib allo stem cell transplant    Day -6 (8/4): flu/cy  Day -5 through day -2 (8/5-8/8): flu  Day -1 (8/9): TBI  Day 0 (8/10): transplant     1.  Acute lymphoblastic leukemia,  [place] chromosome positive in CR, MRD neg. S/p allo sib PBSCT. (ABO matched)  HCT-CI score: 3 (prior solid tumor)  Day +100 bone marrow biopsy is 100% donor with no morphologic or flow cytometric evidence of leukemia BCR abl is pending.  - Day +180 restaging BM bx- still in CR  100% donor;  2/16/22 BCR ABL major breakpoint undetectable.   - 1 year restaging 8/18/2022: Markedly hypocellular bone marrow [less than 10% cellular] with maturing trilineage hematopoiesis and no definite morphologic or immunophenotypic evidence for involvement by B lymphoblastic leukemia. BCRABL1 PCR not detected, bone marrow % donor. FISH NORMAL No evidence of BCR-ABL1 fusion  - Maintenance with TKI posttransplantation given his Ph positive ALL that have not been started previously presumed secondary to multiple active issues specifically infections and COVID.  Per Avila Tobar: will reconsider this patient will think about whether this is something he would like to consider he was previously on Dasatinib, we would start on a low dose and increase as tolerated.  This is on hold now pending active GVHD therapy, we discussed on this visit 10/18 in agreement with holding off.  - 2 year restaging 8/7/2023 BMB: - No morphologic or immunophenotypic evidence of recurrent/persistent B-lymphoblastic leukemia. Slightly hypocellular marrow (10-20% estimated cellularity, patchy and variable), with trilineage hematopoetiic maturation and less than 2% blasts. Peripheral blood showing slight normocytic normochromic anemia and slight thrombocytopenia. BM % donor. FISH/CG pending. Add on PCR BCR-ABL     2.  HEME: CBC stable.   3/2023 iron low 28, iron saturation index 10%, transferrin 225, ferritin 1381 down trending (in retrospect that was the time of epidural abscess as well).  B12, folate normal.  High copper and zinc borderline low, 5/2023 started zinc.  Started iron replacement in 4/2023, recheck iron profile on follow up 8/152023 low  iron and % sat with slight decrease in hgb--  restart low dose iron    3.  FEN/Renal: Creatinine 0.97, s/p nephrectormy, nephrology following     4.    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   8/15/23 cGVHD NIG scoring eyes 3, oral 1, no other organ involvement clinically #Skin GVHD- history of biopsy proven GVHD of the skin 8/30/2021; resolved.   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 WNL today   # Ocular GVHD: follows with ophthalmology. Maxitrol to lids, continue refreshing drops QID. Score 3, but down to 3-4 times eye drops from >10/day  Followed closely by Dr. Juarez with significant improvement with scleral lenses and eyedrops  # Oral GVHD: dex s/s 2-3 x.  Lichenoid changes have largely resolved with no open ulcers since 11/8/2022, except for persistent lichenoid changes to the mid right/left buccal mucosa, to avoid applying any medication to that area directly.     Previous therapies  Tacrolimus-previously decided to stay on same dose, no checks of levels if clinically stable, and no need switch to sirolimus. (keep tac low as gvhd is quiet and viremia). 5/10 level 45 with starting of COVID therapy and D-D intractions (Paxlovid for COVID19, which has a drug interaction with tacrolimus-Ritonavir increases serum levels of tacrolimus).   Tacrolimus level subtherapeutic, increase to 2.5 mg twice daily recently, 8/23/2022 instructed to change tacrolimus to 2 mg twice daily. 9/6 with mild NEGRA, hyperkalemia, hypomagnesemia, and reports some tremors and cramps, suspect tacrolimus to be high therefore empirically decreased to 1.5 twice daily, skip morning dose of tacrolimus and took 2 mg today after his afternoon labs. Decrease to 1mg BID on Saturday.  Sirolimus  9/21 despite fluids and a dose adjustment of tacrolimus patient seem to have persistence NORBERTO related NEGRA, therefore after discussion with the patient decision was made to  transition to sirolimus that was started on 9/21, patient initially seem to tolerate this well with normalization of kidney function  10/11 presented with flares of ocular and oral GVHD, fluid retention and gain of 5-6 kg, lower extremity edema, abdominal distention, total protein to creatinine ratio elevated at 0.4, in addition to elevation in BUN and creatinine, HTN.  Picture most consistent with sirolimus related side effects.  Less likely that the patient will tolerate even a lower level of sirolimus.  Therefore the patient was instructed to stop sirolimus, last dose on 10/10. 10/14 level 3.5 appropriately trending down.     Corticosteroids  3/1-3/16: prednisone 100mg every day  3/17 75mg every day   3/31 75 alt with 50  4/6: 75 alt with 25mg every other day  4/12 pred tapered to 60 alternating with 25mg   4/15 In setting of viruses trying to reactivate,GVHD stable: Taper 60/15mg alternating.  4/20 decrease pred further to 50/10.    4/25 taper pred to 50/0 every other day as long as symptoms stable.   5/2 Pred decrease 40/0 alternating every other day.   5/13 decrease to 30/0  5/20 decrease to 20/0 then slowly by 5 mg weekly  6/7 decrease to 10/0  Went up to 15/0 with mild increased LFTs  8/23/2022 increased to 20/0, with persistence of oral ulcers and active ocular GVHD.  Discussed with the patient the importance of stopping chewing of nicotine gums for prolonged hours as that would not help with the healing of the oral ulcers.  I discussed with the patient alternatives of nicotine patches that he will reconsider and let me know.  9/6 decreased to 15 alternating with 0  9/13 decreased to 10 alternating with 0  9/21 discontinued tacrolimus given persistent NEGRA and single kidney and start the patient on sirolimus without loading dose.  We will get a list of possible side effects and adverse events from the Pharm.D. see sirolimus section above.  10/11 GVHD flare. Sirolimus stopped. Resume prednisone 40 mg daily as  of 10/12, no change with mildly worsening eye pain 10/14  Reduce pred to 35mg 10/25 and 25mg 11/1 11/8 20 mg   11/22 15 mg  12/13/22 10 mg (plan to taper to 5mg then slow taper 1 mg per month given long pred history)  2/23/23 lower from 5 mg to 4 mg for the next month  3/28/2023 - 5/2/2023- 8/15/2023 continue on 10/4 given adrenal insufficieny with recent am cortisol check and clinical symptoms on taper to lower dose of 4 mg daily    Belumosudil  Patient intolerant/with disease flares on tacrolimus and sirolimus see above.  Discussed with the patient the different options for therapy of chronic GVHD that are FDA approved.  Given the side effect profiles after discussing in detail and given the least nephrotoxicity and expected response, taking into account other metabolic effect as well.  We will proceed with prior authorization with belumosudil.  Patient was given material to review for possible expected adverse events and side effects with both Belumosodil and ruxolitinib.   --- Started on 10/18/2022 - skin/liver/oral GVHD inactive, and occular symptoms controlled/symptomatic improvement. Will continue.     5.  ID:   # Recent history of Epidural abscess: Followed by Dr. Candelario ID, plan to be on IV ceftriaxone for at least 6 weeks course through 5/11/2023-to be determined on further follow-up.  See imaging with MRI follow-up as above.  3/30/2023 blood culture with Staph epidermidis  3/31/2023 BONE, L5, FLUOROSCOPICALLY-GUIDED NEEDLE BIOPSY: Benign bone with trilineage hematopoiesis (normal) Negative for neoplasm Negative for acute osteomyelitis. DISC, L5-S1, ASPIRATION FOR CYTOLOGY:   Acellular debris; no cellular material present for evaluation     #History of COVID x2 last 1/2023 and influenza    Treated with Paxlovid with persistent fatigue but no active respiratory symptoms  received his influenza annual vaccine as well as COVID boosters locally 11/16/2022     #History of pulmonary infiltrates:   4/20 CT chest  with new RUL infiltrate. Highly immunocompromised. 4/22 Fungitell/asp GM were neg. BAL 4/29 NGTD, increased micafungin to 150mg IV daily-- f/u 6/1 Dr Garcia CT with mixed results, followed with Dr. Garcia August 2022 with resolution of pulmonary nodules and normalization of LFTs we will switch patient will switch patient to posaconazole prophylactic dose and monitor LFTs and any infectious concerns closely     #History of CMV viremia:    - CMV viremia up to 1100. 4/15 started valcyte 900mg PO BID. 4/20 CMV <137, 5/10 ND, decreased to 450mg BID 4/30 - continue 4 weeks as long as CMV <137 till 5/28 + weekly CMV  - Switch to acyclovir after completion of current therapy 5/28. 10/11 CMV ND. Check monthly on IST, 11/8 CMV <137, recheck 3/1 ND     - PPx:   ACV  Posaconazole (previously on micafungin with LFts abl, hx of pulmonary nodules needign treatment dose).   Pentamidine, last 11/22  Azithro 250mg daily (stopped levaquin due to possible tendonitis).   IgG1/2023 364, 4/2023 460      - EBV viremia: 4/20 CAP CT (w/ contrast): No adenopathy. S/p 4/22 and 5/4/22 rituximab 375mg/m2 5/10 ND x2, 6/7 ND, 8/18/2022 971, 10/11 ND, check every other month on IST, 11/8 ND, 3/28 843  S/p covid vaccine series 12/2021  S/p evusheld   Deferred annual vaccines in the setting of GVHD and immunosuppression therapy     6. Endo:   - Hx of graves disease; On synthroid follows with endocrine  - HLD: 11/2022 cholesterol 355, triglycerides 153, -- 11/8 started rosuvastatin 5 mg daily, 11/22 CPK within normal, 5/2/2023 repeat lipid profile cholesterol down to 202, triglycerides 191, LDL now normal at 95.  We will continue same dose of rosuvastatin and add fish oil 1g BID. Repeat August with PCP     7. CV:   - Hypertension history   - TTE most recently 10/2022     8. GI:   -Omeprazole for heartburn  -LFTs as above, now normal. Off ursodiol     9. Psych:   - Situational anxiety - lexapro 10mg daily.   - Insomnia: worse on steroids. Ativan,  trazadone, melatonin     10. MSK:   -History of left food drop, PT. Occasional muscle cramps discussed optimizing vitamin D levels and considering vitamin D, some of the symptomatology of muscle cramps are likely related to chronic GVHD.  No muscle cramps reported today.  -4/2023 new tearing of the right acetabular labrum anterosuperiorly referred to to orthopedic surgery.       11. HCM/Age appropriate cancer screening:  -Discussed with the patient importance of age-appropriate cancer screening including colonoscopies, he is due for this.  -Discussed importance of at least once a year vitamin D level check, 8/18/2022 wnl  -Screening for secondary iron overload, see heme section above  -Yearly TSH 2022 wnl; yearly lipid panel - see GI section above  -9/6/2022 DEXA scan Based on BMD diagnosis is consistent with normal bone density based on WHO criteria Ref. 1   -PFTs 9/20/22, see above  -Metabolic other, 8/2022 testosterone within normal  -11/22/2022 discussed with the patient the challenges and the stresses of chronic illness and healthcare fatigue.  Patient had previously been on Lexapro that we restarted confirmed with pharmacy that there are no drug drug interactions.  Additionally we will referred patient to PMNR to help with physical rehab and strength to help with fatigue.  Our social workers will also reach out to Nik and his wife for further resources including support groups for patients with chronic illness and fatigue post transplant.  -Referral to palliative care for symptom and pain management including insomnia     - RTC 8 weeks, follow-up locally with infectious disease, neurosurgery and pain management for management of osteomyelitis and pain control  continue same GVHD treatment (on physiologic dose of steroids and good GVHD control overall). PCP follow up for lipid profile, TSH, age appropriate cancer screening, discussed starting vaccinations since he is on low dose steroids and will be on long  term IST.    I spent 45 minutes in the care of this patient today, which included time necessary for preparation for the visit, obtaining history, ordering medications/tests/procedures as medically indicated, review of pertinent medical literature, counseling of the patient, communication of recommendations to the care team, and documentation time.

## 2023-08-14 NOTE — PROGRESS NOTES
This is a recent snapshot of the patient's [facility] medical record.  For current drug dose and complete information and questions, call [phone] or In [facility] pool, [facility] ([id number])  CSN Number:  [id number]

## 2023-08-15 ENCOUNTER — APPOINTMENT (OUTPATIENT)
Dept: LAB | Facility: CLINIC | Age: 63
End: 2023-08-15
Attending: INTERNAL MEDICINE
Payer: COMMERCIAL

## 2023-08-15 ENCOUNTER — ONCOLOGY VISIT (OUTPATIENT)
Dept: TRANSPLANT | Facility: CLINIC | Age: 63
End: 2023-08-15
Attending: INTERNAL MEDICINE
Payer: COMMERCIAL

## 2023-08-15 VITALS
BODY MASS INDEX: 32.23 KG/M2 | RESPIRATION RATE: 16 BRPM | WEIGHT: 244.3 LBS | DIASTOLIC BLOOD PRESSURE: 77 MMHG | TEMPERATURE: 97.9 F | HEART RATE: 77 BPM | OXYGEN SATURATION: 96 % | SYSTOLIC BLOOD PRESSURE: 123 MMHG

## 2023-08-15 DIAGNOSIS — E89.0 POSTABLATIVE HYPOTHYROIDISM: ICD-10-CM

## 2023-08-15 DIAGNOSIS — C91.01 ACUTE LYMPHOBLASTIC LEUKEMIA (ALL) IN REMISSION (H): Primary | ICD-10-CM

## 2023-08-15 DIAGNOSIS — C91.01 ACUTE LYMPHOBLASTIC LEUKEMIA (ALL) IN REMISSION (H): ICD-10-CM

## 2023-08-15 LAB
ALBUMIN SERPL BCG-MCNC: 4 G/DL (ref 3.5–5.2)
ALP SERPL-CCNC: 117 U/L (ref 40–129)
ALT SERPL W P-5'-P-CCNC: 17 U/L (ref 0–70)
ANION GAP SERPL CALCULATED.3IONS-SCNC: 8 MMOL/L (ref 7–15)
AST SERPL W P-5'-P-CCNC: 26 U/L (ref 0–45)
BASOPHILS # BLD AUTO: 0 10E3/UL (ref 0–0.2)
BASOPHILS NFR BLD AUTO: 0 %
BILIRUB SERPL-MCNC: 0.6 MG/DL
BUN SERPL-MCNC: 32.5 MG/DL (ref 8–23)
CALCIUM SERPL-MCNC: 10 MG/DL (ref 8.8–10.2)
CHLORIDE SERPL-SCNC: 99 MMOL/L (ref 98–107)
CREAT SERPL-MCNC: 1.18 MG/DL (ref 0.67–1.17)
DEPRECATED HCO3 PLAS-SCNC: 28 MMOL/L (ref 22–29)
EOSINOPHIL # BLD AUTO: 0 10E3/UL (ref 0–0.7)
EOSINOPHIL NFR BLD AUTO: 0 %
ERYTHROCYTE [DISTWIDTH] IN BLOOD BY AUTOMATED COUNT: 16.2 % (ref 10–15)
FERRITIN SERPL-MCNC: 1610 NG/ML (ref 31–409)
GFR SERPL CREATININE-BSD FRML MDRD: 68 ML/MIN/1.73M2
GLUCOSE SERPL-MCNC: 133 MG/DL (ref 70–99)
HCT VFR BLD AUTO: 34.3 % (ref 40–53)
HGB BLD-MCNC: 11.8 G/DL (ref 13.3–17.7)
IMM GRANULOCYTES # BLD: 0.1 10E3/UL
IMM GRANULOCYTES NFR BLD: 1 %
IRON BINDING CAPACITY (ROCHE): 282 UG/DL (ref 240–430)
IRON SATN MFR SERPL: 13 % (ref 15–46)
IRON SERPL-MCNC: 38 UG/DL (ref 61–157)
LYMPHOCYTES # BLD AUTO: 2.8 10E3/UL (ref 0.8–5.3)
LYMPHOCYTES NFR BLD AUTO: 29 %
MAGNESIUM SERPL-MCNC: 2 MG/DL (ref 1.7–2.3)
MCH RBC QN AUTO: 34 PG (ref 26.5–33)
MCHC RBC AUTO-ENTMCNC: 34.4 G/DL (ref 31.5–36.5)
MCV RBC AUTO: 99 FL (ref 78–100)
MONOCYTES # BLD AUTO: 0.6 10E3/UL (ref 0–1.3)
MONOCYTES NFR BLD AUTO: 6 %
NEUTROPHILS # BLD AUTO: 6.3 10E3/UL (ref 1.6–8.3)
NEUTROPHILS NFR BLD AUTO: 64 %
NRBC # BLD AUTO: 0 10E3/UL
NRBC BLD AUTO-RTO: 0 /100
PLATELET # BLD AUTO: 174 10E3/UL (ref 150–450)
POTASSIUM SERPL-SCNC: 4.7 MMOL/L (ref 3.4–5.3)
PROT SERPL-MCNC: 6.3 G/DL (ref 6.4–8.3)
RBC # BLD AUTO: 3.47 10E6/UL (ref 4.4–5.9)
SODIUM SERPL-SCNC: 135 MMOL/L (ref 136–145)
T3 SERPL-MCNC: 97 NG/DL (ref 85–202)
T4 FREE SERPL-MCNC: 1.71 NG/DL (ref 0.9–1.7)
TSH SERPL DL<=0.005 MIU/L-ACNC: 0.16 UIU/ML (ref 0.3–4.2)
WBC # BLD AUTO: 9.8 10E3/UL (ref 4–11)

## 2023-08-15 PROCEDURE — 82728 ASSAY OF FERRITIN: CPT | Performed by: INTERNAL MEDICINE

## 2023-08-15 PROCEDURE — 99215 OFFICE O/P EST HI 40 MIN: CPT | Performed by: INTERNAL MEDICINE

## 2023-08-15 PROCEDURE — 36591 DRAW BLOOD OFF VENOUS DEVICE: CPT | Performed by: INTERNAL MEDICINE

## 2023-08-15 PROCEDURE — 84443 ASSAY THYROID STIM HORMONE: CPT | Performed by: INTERNAL MEDICINE

## 2023-08-15 PROCEDURE — 84480 ASSAY TRIIODOTHYRONINE (T3): CPT | Performed by: INTERNAL MEDICINE

## 2023-08-15 PROCEDURE — 99213 OFFICE O/P EST LOW 20 MIN: CPT | Performed by: INTERNAL MEDICINE

## 2023-08-15 PROCEDURE — 83550 IRON BINDING TEST: CPT | Performed by: INTERNAL MEDICINE

## 2023-08-15 PROCEDURE — 84439 ASSAY OF FREE THYROXINE: CPT | Performed by: INTERNAL MEDICINE

## 2023-08-15 PROCEDURE — 85004 AUTOMATED DIFF WBC COUNT: CPT | Performed by: INTERNAL MEDICINE

## 2023-08-15 PROCEDURE — 250N000011 HC RX IP 250 OP 636: Mod: JZ | Performed by: INTERNAL MEDICINE

## 2023-08-15 PROCEDURE — 80053 COMPREHEN METABOLIC PANEL: CPT | Performed by: INTERNAL MEDICINE

## 2023-08-15 PROCEDURE — 83735 ASSAY OF MAGNESIUM: CPT | Performed by: INTERNAL MEDICINE

## 2023-08-15 RX ORDER — HEPARIN SODIUM (PORCINE) LOCK FLUSH IV SOLN 100 UNIT/ML 100 UNIT/ML
5 SOLUTION INTRAVENOUS ONCE
Status: COMPLETED | OUTPATIENT
Start: 2023-08-15 | End: 2023-08-15

## 2023-08-15 RX ADMIN — Medication 5 ML: at 13:18

## 2023-08-15 ASSESSMENT — PAIN SCALES - GENERAL: PAINLEVEL: MODERATE PAIN (4)

## 2023-08-15 NOTE — LETTER
8/15/2023         RE: Nik Nava  [address] [facility] [id number]        Dear Colleague,    Thank you for referring your patient, Nik Nava, to the [facility] BLOOD AND MARROW TRANSPLANT PROGRAM [place]. Please see a copy of my visit note below.        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    HPI:    Nik returns to clinic for follow up. His most active clinical issue currently is his back pain and he is scheduled to see neurosurgery today.  They are contemplating surgery.  He is followed closely for adjusting his pain regimen with PCP locally.  In terms of chronic GVHD symptoms are better controlled since last seen he specifically is using eyedrops 3-4 times a day only down from more than 10 times per day on last follow-up, believes ocular lenses have made a significant difference in his quality of life and ocular symptoms.  No new oral sores or ulcers however he does have persistent mild lichenoid changes with a healed epithelialized ulcer on bilateral buccal mucosa.  Still using Dex 2-3 times a day swish and spit.  No new respiratory symptoms.  No chest pain.  No nausea vomiting or diarrhea.  No bleeding and no headaches.    Review of Systems                                                                                                                                         10 point Review of systems was negative except as detailed above     PHYSICAL EXAM                                                                                                                                                   KPS: 60-70      Wt Readings from Last 4 Encounters:   08/07/23 108.4 kg (239 lb)   08/07/23 108.4 kg (239 lb)   06/13/23 108.9 kg (240 lb)   06/13/23 109.3 kg (241 lb)      General: NAD.  HEENT: sclera anicteric, injected conjunctivae. Mild patchy lichenoid changes in oral  mucosa with prominent area of healed in the right/left mid buccal mucosa , no ulcers seen.    Lungs:  CTA b/l  no wheezes  CV: RRR  no gallop  Abd; soft, NABS, protuberant  Ext/ Skin: Skin bruising on bilateral forearms, fainting of previous hyperpigmented areas of previously known cGVHD  LE 1+ bilateral edema in lower extremities, left>right noted today; stable    cGVHD therapy started on February 24 2022  - Baseline NIH scoring 2/24/2022 : skin 2 maculopapular rash/erythema with no sclerotic features, mouth score 1 mild symptoms with lichenoid changes less than 25%, eyes score 2 moderate symptoms without new visual impairments due to KCS requiring lubricant eyedrops more than 3 times a day, overall mild scale for her provider  - 3/25/2022 1 month NIH treatment assessment on PQRST: skin 2, mouth score 1 mild symptoms with NO lichenoid changes, eyes score 2 moderate symptoms w requiring lubricant eyedrops more than 3 times a day, liver score 1 ALT 3.7x ULN and nl bilirubin   - 3/31  Mouth clear; eyes minimal LFT still abn; no joint or new GI sx  - 4/28 Mouth clear, Left>R eye is mild sclera erythema, lids are pink and irritated appearing, LFT's slow improvement, no new joint, skin, or GI symptoms.   -5/11 mouth with mild erythema but no lichenoid changes, eyes using eyedrops 4-6 times a day score of 2, LFTs significantly improved from baseline slight elevation in alk phos and AST with normal bilirubin, skin with hyperpigmentation from old acute GVHD no active skin rashes, no GI symptoms no musculoskeletal complaints.  -5/26 Mouth with very slight lichenoid changes, erythema.  Eyes still using eyedrops 4-6x per day.  LFTs essentially normal with slight elevation in alk phos.  Skin with hyperpigmentation from old acute GVHD, no active rashes, no GI or MSK concerns.  Skin 0, mouth 0 but with lichenoid changes, eyes 2  -6/7/22 Mouth with no lichenoid changes, erythema.  Eyes still using eyedrops 4-6x per day.  LFTs  essentially normal with slight elevation in alk phos.  Skin with hyperpigmentation from old acute GVHD, no active rashes, no GI or MSK concerns.  Skin 0, mouth 0, eyes 2     -6 month PQRST assessment 9/6/2022: eyes 3, mouth 0 for symptoms, 25% lichenoid changes (1), liver/GI/skin 0    11/8/2022, 11/22/2022, 12/13/22 cGVHD NIG scoring eyes 3, no other organ involvement clinically  3/28/23 cGVHD NIG scoring eyes 3, no other organ involvement clinically  5/2/23 cGVHD NIG scoring eyes 3, oral 1, no other organ involvement clinically  6/13/23 cGVHD NIG scoring eyes 3, oral 1, no other organ involvement clinically  8/15/23 cGVHD NIG scoring eyes 3 (down to 3-4 times eye drop from >10 but still using scleral lenses), oral 1, no other organ involvement clinically  Lab:    Lab Results   Component Value Date    WBC 6.5 08/07/2023    ANEU 3.5 10/26/2021    HGB 12.1 (L) 08/07/2023    HCT 35.3 (L) 08/07/2023     (L) 08/07/2023     08/07/2023    POTASSIUM 4.3 08/07/2023    CHLORIDE 104 08/07/2023    CO2 27 08/07/2023    GLC 98 08/07/2023    BUN 35.2 (H) 08/07/2023    CR 1.07 08/07/2023    MAG 2.1 06/13/2023    INR 0.90 12/01/2022    BILITOTAL 0.8 08/07/2023    AST 30 08/07/2023    ALT 21 08/07/2023    ALKPHOS 108 08/07/2023    PROTTOTAL 6.4 08/07/2023    ALBUMIN 4.0 08/07/2023 4/28/2023    MRI Lumbar spine  1.  The previously seen ventral epidural abscess has resolved with small amount of residual ventral epidural phlegmon.   2.  Marrow edema about the L5-S1 endplates has mildly worsened within new rim-enhancing subchondral lesion in the left S1 superior endplate. This could reflect progression of osteomyelitis.      MRI hip right  1.  No evidence of septic arthritis of the right hip joint.   2.  There is degeneration and likely tearing of the right acetabular labrum anterosuperiorly, and probably low-grade chondromalacia of the right hip joint.   3.  Abnormality at L5-S1 compatible with known  discitis-osteomyelitis.    ASSESSMENT AND PLAN   Nik Nava is a 63 year old male with Ph Pos ALL, day 734 s/p sib allo stem cell transplant    Day -6 (8/4): flu/cy  Day -5 through day -2 (8/5-8/8): flu  Day -1 (8/9): TBI  Day 0 (8/10): transplant     1.  Acute lymphoblastic leukemia, [place] chromosome positive in CR, MRD neg. S/p allo sib PBSCT. (ABO matched)  HCT-CI score: 3 (prior solid tumor)  Day +100 bone marrow biopsy is 100% donor with no morphologic or flow cytometric evidence of leukemia BCR abl is pending.  - Day +180 restaging BM bx- still in CR  100% donor;  2/16/22 BCR ABL major breakpoint undetectable.   - 1 year restaging 8/18/2022: Markedly hypocellular bone marrow [less than 10% cellular] with maturing trilineage hematopoiesis and no definite morphologic or immunophenotypic evidence for involvement by B lymphoblastic leukemia. BCRABL1 PCR not detected, bone marrow % donor. FISH NORMAL No evidence of BCR-ABL1 fusion  - Maintenance with TKI posttransplantation given his Ph positive ALL that have not been started previously presumed secondary to multiple active issues specifically infections and COVID.  Per Avila Tobar: will reconsider this patient will think about whether this is something he would like to consider he was previously on Dasatinib, we would start on a low dose and increase as tolerated.  This is on hold now pending active GVHD therapy, we discussed on this visit 10/18 in agreement with holding off.  - 2 year restaging 8/7/2023 BMB: - No morphologic or immunophenotypic evidence of recurrent/persistent B-lymphoblastic leukemia. Slightly hypocellular marrow (10-20% estimated cellularity, patchy and variable), with trilineage hematopoetiic maturation and less than 2% blasts. Peripheral blood showing slight normocytic normochromic anemia and slight thrombocytopenia. BM % donor. FISH/CG pending. Add on PCR BCR-ABL     2.  HEME: CBC stable.   3/2023 iron low 28, iron  saturation index 10%, transferrin 225, ferritin 1381 down trending (in retrospect that was the time of epidural abscess as well).  B12, folate normal.  High copper and zinc borderline low, 5/2023 started zinc.  Started iron replacement in 4/2023, recheck iron profile on follow up 8/152023 low iron and % sat with slight decrease in hgb--  restart low dose iron    3.  FEN/Renal: Creatinine 0.97, s/p nephrectormy, nephrology following     4.    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   8/15/23 cGVHD NIG scoring eyes 3, oral 1, no other organ involvement clinically #Skin GVHD- history of biopsy proven GVHD of the skin 8/30/2021; resolved.   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 WNL today   # Ocular GVHD: follows with ophthalmology. Maxitrol to lids, continue refreshing drops QID. Score 3, but down to 3-4 times eye drops from >10/day  Followed closely by Dr. Juarez with significant improvement with scleral lenses and eyedrops  # Oral GVHD: dex s/s 2-3 x.  Lichenoid changes have largely resolved with no open ulcers since 11/8/2022, except for persistent lichenoid changes to the mid right/left buccal mucosa, to avoid applying any medication to that area directly.     Previous therapies  Tacrolimus-previously decided to stay on same dose, no checks of levels if clinically stable, and no need switch to sirolimus. (keep tac low as gvhd is quiet and viremia). 5/10 level 45 with starting of COVID therapy and D-D intractions (Paxlovid for COVID19, which has a drug interaction with tacrolimus-Ritonavir increases serum levels of tacrolimus).   Tacrolimus level subtherapeutic, increase to 2.5 mg twice daily recently, 8/23/2022 instructed to change tacrolimus to 2 mg twice daily. 9/6 with mild NEGRA, hyperkalemia, hypomagnesemia, and reports some tremors and cramps, suspect tacrolimus to be high therefore empirically decreased to 1.5 twice daily, skip  morning dose of tacrolimus and took 2 mg today after his afternoon labs. Decrease to 1mg BID on Saturday.  Sirolimus  9/21 despite fluids and a dose adjustment of tacrolimus patient seem to have persistence NORBERTO related NEGRA, therefore after discussion with the patient decision was made to transition to sirolimus that was started on 9/21, patient initially seem to tolerate this well with normalization of kidney function  10/11 presented with flares of ocular and oral GVHD, fluid retention and gain of 5-6 kg, lower extremity edema, abdominal distention, total protein to creatinine ratio elevated at 0.4, in addition to elevation in BUN and creatinine, HTN.  Picture most consistent with sirolimus related side effects.  Less likely that the patient will tolerate even a lower level of sirolimus.  Therefore the patient was instructed to stop sirolimus, last dose on 10/10. 10/14 level 3.5 appropriately trending down.     Corticosteroids  3/1-3/16: prednisone 100mg every day  3/17 75mg every day   3/31 75 alt with 50  4/6: 75 alt with 25mg every other day  4/12 pred tapered to 60 alternating with 25mg   4/15 In setting of viruses trying to reactivate,GVHD stable: Taper 60/15mg alternating.  4/20 decrease pred further to 50/10.    4/25 taper pred to 50/0 every other day as long as symptoms stable.   5/2 Pred decrease 40/0 alternating every other day.   5/13 decrease to 30/0  5/20 decrease to 20/0 then slowly by 5 mg weekly  6/7 decrease to 10/0  Went up to 15/0 with mild increased LFTs  8/23/2022 increased to 20/0, with persistence of oral ulcers and active ocular GVHD.  Discussed with the patient the importance of stopping chewing of nicotine gums for prolonged hours as that would not help with the healing of the oral ulcers.  I discussed with the patient alternatives of nicotine patches that he will reconsider and let me know.  9/6 decreased to 15 alternating with 0  9/13 decreased to 10 alternating with 0  9/21 discontinued  tacrolimus given persistent NEGRA and single kidney and start the patient on sirolimus without loading dose.  We will get a list of possible side effects and adverse events from the Pharm.D. see sirolimus section above.  10/11 GVHD flare. Sirolimus stopped. Resume prednisone 40 mg daily as of 10/12, no change with mildly worsening eye pain 10/14  Reduce pred to 35mg 10/25 and 25mg 11/1 11/8 20 mg   11/22 15 mg  12/13/22 10 mg (plan to taper to 5mg then slow taper 1 mg per month given long pred history)  2/23/23 lower from 5 mg to 4 mg for the next month  3/28/2023 - 5/2/2023- 8/15/2023 continue on 10/4 given adrenal insufficieny with recent am cortisol check and clinical symptoms on taper to lower dose of 4 mg daily    Belumosudil  Patient intolerant/with disease flares on tacrolimus and sirolimus see above.  Discussed with the patient the different options for therapy of chronic GVHD that are FDA approved.  Given the side effect profiles after discussing in detail and given the least nephrotoxicity and expected response, taking into account other metabolic effect as well.  We will proceed with prior authorization with belumosudil.  Patient was given material to review for possible expected adverse events and side effects with both Belumosodil and ruxolitinib.   --- Started on 10/18/2022 - skin/liver/oral GVHD inactive, and occular symptoms controlled/symptomatic improvement. Will continue.     5.  ID:   # Recent history of Epidural abscess: Followed by Dr. Candelario ID, plan to be on IV ceftriaxone for at least 6 weeks course through 5/11/2023-to be determined on further follow-up.  See imaging with MRI follow-up as above.  3/30/2023 blood culture with Staph epidermidis  3/31/2023 BONE, L5, FLUOROSCOPICALLY-GUIDED NEEDLE BIOPSY: Benign bone with trilineage hematopoiesis (normal) Negative for neoplasm Negative for acute osteomyelitis. DISC, L5-S1, ASPIRATION FOR CYTOLOGY:   Acellular debris; no cellular material present  for evaluation     #History of COVID x2 last 1/2023 and influenza    Treated with Paxlovid with persistent fatigue but no active respiratory symptoms  received his influenza annual vaccine as well as COVID boosters locally 11/16/2022     #History of pulmonary infiltrates:   4/20 CT chest with new RUL infiltrate. Highly immunocompromised. 4/22 Fungitell/asp GM were neg. BAL 4/29 NGTD, increased micafungin to 150mg IV daily-- f/u 6/1 Dr Garcia CT with mixed results, followed with Dr. Garcia August 2022 with resolution of pulmonary nodules and normalization of LFTs we will switch patient will switch patient to posaconazole prophylactic dose and monitor LFTs and any infectious concerns closely     #History of CMV viremia:    - CMV viremia up to 1100. 4/15 started valcyte 900mg PO BID. 4/20 CMV <137, 5/10 ND, decreased to 450mg BID 4/30 - continue 4 weeks as long as CMV <137 till 5/28 + weekly CMV  - Switch to acyclovir after completion of current therapy 5/28. 10/11 CMV ND. Check monthly on IST, 11/8 CMV <137, recheck 3/1 ND     - PPx:   ACV  Posaconazole (previously on micafungin with LFts abl, hx of pulmonary nodules needign treatment dose).   Pentamidine, last 11/22  Azithro 250mg daily (stopped levaquin due to possible tendonitis).   IgG1/2023 364, 4/2023 460      - EBV viremia: 4/20 CAP CT (w/ contrast): No adenopathy. S/p 4/22 and 5/4/22 rituximab 375mg/m2 5/10 ND x2, 6/7 ND, 8/18/2022 971, 10/11 ND, check every other month on IST, 11/8 ND, 3/28 843  S/p covid vaccine series 12/2021  S/p evusheld   Deferred annual vaccines in the setting of GVHD and immunosuppression therapy     6. Endo:   - Hx of graves disease; On synthroid follows with endocrine  - HLD: 11/2022 cholesterol 355, triglycerides 153, -- 11/8 started rosuvastatin 5 mg daily, 11/22 CPK within normal, 5/2/2023 repeat lipid profile cholesterol down to 202, triglycerides 191, LDL now normal at 95.  We will continue same dose of rosuvastatin and add  fish oil 1g BID. Repeat August with PCP     7. CV:   - Hypertension history   - TTE most recently 10/2022     8. GI:   -Omeprazole for heartburn  -LFTs as above, now normal. Off ursodiol     9. Psych:   - Situational anxiety - lexapro 10mg daily.   - Insomnia: worse on steroids. Ativan, trazadone, melatonin     10. MSK:   -History of left food drop, PT. Occasional muscle cramps discussed optimizing vitamin D levels and considering vitamin D, some of the symptomatology of muscle cramps are likely related to chronic GVHD.  No muscle cramps reported today.  -4/2023 new tearing of the right acetabular labrum anterosuperiorly referred to to orthopedic surgery.       11. HCM/Age appropriate cancer screening:  -Discussed with the patient importance of age-appropriate cancer screening including colonoscopies, he is due for this.  -Discussed importance of at least once a year vitamin D level check, 8/18/2022 wnl  -Screening for secondary iron overload, see heme section above  -Yearly TSH 2022 wnl; yearly lipid panel - see GI section above  -9/6/2022 DEXA scan Based on BMD diagnosis is consistent with normal bone density based on WHO criteria Ref. 1   -PFTs 9/20/22, see above  -Metabolic other, 8/2022 testosterone within normal  -11/22/2022 discussed with the patient the challenges and the stresses of chronic illness and healthcare fatigue.  Patient had previously been on Lexapro that we restarted confirmed with pharmacy that there are no drug drug interactions.  Additionally we will referred patient to PMNR to help with physical rehab and strength to help with fatigue.  Our social workers will also reach out to Nik and his wife for further resources including support groups for patients with chronic illness and fatigue post transplant.  -Referral to palliative care for symptom and pain management including insomnia     - RTC 8 weeks, follow-up locally with infectious disease, neurosurgery and pain management for management  of osteomyelitis and pain control  continue same GVHD treatment (on physiologic dose of steroids and good GVHD control overall). PCP follow up for lipid profile, TSH, age appropriate cancer screening, discussed starting vaccinations since he is on low dose steroids and will be on long term IST.    I spent 45 minutes in the care of this patient today, which included time necessary for preparation for the visit, obtaining history, ordering medications/tests/procedures as medically indicated, review of pertinent medical literature, counseling of the patient, communication of recommendations to the care team, and documentation time.        Akshat Tobar MD

## 2023-08-15 NOTE — NURSING NOTE
"Oncology Rooming Note    August 15, 2023 1:47 PM   Nik Nava is a 63 year old male who presents for:    Chief Complaint   Patient presents with    Port Draw     Labs drawn via port by RN. VS taken.    Oncology Clinic Visit     Acute lymphoblastic leukemia (ALL)     Initial Vitals: /77 (BP Location: Right arm, Patient Position: Sitting, Cuff Size: Adult Large)   Pulse 77   Temp 97.9  F (36.6  C) (Oral)   Resp 16   Wt 110.8 kg (244 lb 4.8 oz)   SpO2 96%   BMI 32.23 kg/m   Estimated body mass index is 32.23 kg/m  as calculated from the following:    Height as of 8/7/23: 1.854 m (6' 1\").    Weight as of this encounter: 110.8 kg (244 lb 4.8 oz). Body surface area is 2.39 meters squared.  Moderate Pain (4) Comment: Data Unavailable   No LMP for male patient.  Allergies reviewed: Yes  Medications reviewed: Yes    Medications: Medication refills not needed today.  Pharmacy name entered into [employer]:    [facility] - [place], [place] - [address] [facility] [place], [place] - [phone]  [facility]    Clinical concerns: Back pain to discuss.      Chelo Quinones, EMT  8/15/2023              "

## 2023-08-15 NOTE — NURSING NOTE
"Chief Complaint   Patient presents with    Port Draw     Labs drawn via port by RN. VS taken.     Port accessed with 20 gauge, 3/4\" power needle by RN, labs collected, line flushed with saline and heparin, then de-accessed.  Vitals taken. Pt checked in for appointment(s).     Jessika Riggs, RN    "

## 2023-08-16 DIAGNOSIS — Z94.81 STATUS POST BONE MARROW TRANSPLANT (H): Primary | ICD-10-CM

## 2023-08-16 DIAGNOSIS — E89.0 POSTABLATIVE HYPOTHYROIDISM: Primary | ICD-10-CM

## 2023-08-16 RX ORDER — LEVOTHYROXINE SODIUM 137 UG/1
137 TABLET ORAL DAILY
Qty: 90 TABLET | Refills: 4 | Status: SHIPPED | OUTPATIENT
Start: 2023-08-16 | End: 2023-11-14 | Stop reason: DRUGHIGH

## 2023-08-18 LAB
CULTURE HARVEST COMPLETE DATE: NORMAL
INTERPRETATION: NORMAL

## 2023-08-21 DIAGNOSIS — D89.813 GVHD AS COMPLICATION OF BONE MARROW TRANSPLANT (H): ICD-10-CM

## 2023-08-21 DIAGNOSIS — T86.09 GVHD AS COMPLICATION OF BONE MARROW TRANSPLANT (H): ICD-10-CM

## 2023-08-21 DIAGNOSIS — C91.01 ACUTE LYMPHOBLASTIC LEUKEMIA (ALL) IN REMISSION (H): Primary | ICD-10-CM

## 2023-08-21 PROCEDURE — 94642 AEROSOL INHALATION TREATMENT: CPT | Performed by: INTERNAL MEDICINE

## 2023-08-21 PROCEDURE — 94640 AIRWAY INHALATION TREATMENT: CPT | Performed by: INTERNAL MEDICINE

## 2023-08-21 RX ORDER — ALBUTEROL SULFATE 0.83 MG/ML
2.5 SOLUTION RESPIRATORY (INHALATION) EVERY 4 HOURS PRN
Status: CANCELLED | OUTPATIENT
Start: 2023-08-21

## 2023-08-21 RX ORDER — BELUMOSUDIL 200 MG/1
1 TABLET ORAL DAILY
Qty: 30 TABLET | Refills: 3 | Status: SHIPPED | OUTPATIENT
Start: 2023-08-21 | End: 2023-11-14

## 2023-08-21 RX ORDER — PENTAMIDINE ISETHIONATE 300 MG/300MG
300 INHALANT RESPIRATORY (INHALATION)
Status: CANCELLED | OUTPATIENT
Start: 2023-08-21

## 2023-08-21 RX ORDER — PENTAMIDINE ISETHIONATE 300 MG/300MG
300 INHALANT RESPIRATORY (INHALATION)
Status: DISCONTINUED | OUTPATIENT
Start: 2023-08-21 | End: 2023-08-21 | Stop reason: HOSPADM

## 2023-08-21 RX ADMIN — PENTAMIDINE ISETHIONATE 300 MG: 300 INHALANT RESPIRATORY (INHALATION) at 11:13

## 2023-08-21 NOTE — PROGRESS NOTES
Nik Nava   Patient was seen today for a Pentamidine nebulizer tx ordered by Dr. Avila Tobar.    Patient was first given 4 puffs Albuterol MDI, after which Pentamidine 300 mg (Lot # [id number]) mixed with 6cc Sterile H20 was administered through a filtered nebulizer.    Pre-treatment: SpO2 = 98%   HR = 80   BBS = clear   Post-treatment: SpO2 = 99%  HR = 76  BBS = clear    No adverse side effects noted by the patient.    This service today was provided by Dr. Castrejon, who was available if needed.     Procedure was completed by LALO INGRAM.

## 2023-08-23 ENCOUNTER — MYC REFILL (OUTPATIENT)
Dept: PALLIATIVE MEDICINE | Facility: CLINIC | Age: 63
End: 2023-08-23
Payer: MEDICARE

## 2023-08-23 DIAGNOSIS — D89.813 GVHD AS COMPLICATION OF BONE MARROW TRANSPLANT (H): ICD-10-CM

## 2023-08-23 DIAGNOSIS — M54.10 RADICULAR PAIN OF RIGHT LOWER EXTREMITY: ICD-10-CM

## 2023-08-23 DIAGNOSIS — M25.551 RIGHT HIP PAIN: ICD-10-CM

## 2023-08-23 DIAGNOSIS — M46.26 OSTEOMYELITIS OF LUMBAR SPINE (H): ICD-10-CM

## 2023-08-23 DIAGNOSIS — T86.09 GVHD AS COMPLICATION OF BONE MARROW TRANSPLANT (H): ICD-10-CM

## 2023-08-23 DIAGNOSIS — G06.1 ABSCESS IN EPIDURAL SPACE OF LUMBAR SPINE: ICD-10-CM

## 2023-08-23 DIAGNOSIS — M54.50 LUMBAR SPINE PAIN: ICD-10-CM

## 2023-08-23 RX ORDER — OXYCODONE HYDROCHLORIDE 5 MG/1
5-10 TABLET ORAL 2 TIMES DAILY PRN
Qty: 30 TABLET | Refills: 0 | Status: SHIPPED | OUTPATIENT
Start: 2023-08-23 | End: 2023-08-31

## 2023-08-23 NOTE — TELEPHONE ENCOUNTER
[facility] Call Center    Phone Message    May a detailed message be left on voicemail: yes     Reason for Call: Other: Patients wife is calling stating that the patient is having some break through pain and the patient is now completely out of this medication. They are needing to  this medication today.      Action Taken: Message routed to:  Other: [facility]    Travel Screening: Not Applicable

## 2023-08-23 NOTE — TELEPHONE ENCOUNTER
Received call from patient requesting refill(s) oxyCODONE (ROXICODONE) 5 MG tablet    Last dispensed from pharmacy on 07/25/23    Patient's last office/virtual visit by prescribing provider on 06/15/23  Next office/virtual appointment scheduled for None    Last urine drug screen date 05/12/23  Current opioid agreement on file (completed within the last year) Yes Date of opioid agreement: 05/12/23    E-prescribe to      [facility] - [place], [place] - [address]    Will route to [facility] for review and preparation of prescription(s).    Erika Tobin MA  Pain management, [place]

## 2023-08-23 NOTE — TELEPHONE ENCOUNTER
Chart reviewed - Request appears appropriate. Refilled for 30 day supply.     Akilah Shah DNP, APRN, AGNP-C  [facility]

## 2023-08-23 NOTE — TELEPHONE ENCOUNTER
Refills have been requested for the following medications:         oxyCODONE (ROXICODONE) 5 MG tablet [Akilah YAP]     Preferred pharmacy: [facility] - [place], [place] - [address] [address]

## 2023-08-23 NOTE — TELEPHONE ENCOUNTER
Medication refill information reviewed.     Due date for oxyCODONE (ROXICODONE) 5 MG tablet  is 8/24/2023     Prescriptions prepped for review.     Will route to provider.     Natty Tao RN  [facility]  [phone]

## 2023-08-30 ASSESSMENT — PAIN SCALES - PAIN ENJOYMENT GENERAL ACTIVITY SCALE (PEG)
AVG_PAIN_PASTWEEK: 5
INTERFERED_GENERAL_ACTIVITY: 7
AVG_PAIN_PASTWEEK: 5
INTERFERED_ENJOYMENT_LIFE: 7
INTERFERED_ENJOYMENT_LIFE: 7
PEG_TOTALSCORE: 6.33
INTERFERED_GENERAL_ACTIVITY: 7
PEG_TOTALSCORE: 6.33

## 2023-08-31 ENCOUNTER — OFFICE VISIT (OUTPATIENT)
Dept: PALLIATIVE MEDICINE | Facility: CLINIC | Age: 63
End: 2023-08-31
Payer: COMMERCIAL

## 2023-08-31 VITALS — HEART RATE: 69 BPM | SYSTOLIC BLOOD PRESSURE: 122 MMHG | DIASTOLIC BLOOD PRESSURE: 74 MMHG

## 2023-08-31 DIAGNOSIS — Z94.81 STATUS POST BONE MARROW TRANSPLANT (H): ICD-10-CM

## 2023-08-31 DIAGNOSIS — M54.50 LUMBAR SPINE PAIN: ICD-10-CM

## 2023-08-31 DIAGNOSIS — M25.551 RIGHT HIP PAIN: ICD-10-CM

## 2023-08-31 DIAGNOSIS — D89.813 GVHD AS COMPLICATION OF BONE MARROW TRANSPLANT (H): ICD-10-CM

## 2023-08-31 DIAGNOSIS — M54.10 RADICULAR PAIN OF RIGHT LOWER EXTREMITY: ICD-10-CM

## 2023-08-31 DIAGNOSIS — G06.1 ABSCESS IN EPIDURAL SPACE OF LUMBAR SPINE: Primary | ICD-10-CM

## 2023-08-31 DIAGNOSIS — T86.09 GVHD AS COMPLICATION OF BONE MARROW TRANSPLANT (H): ICD-10-CM

## 2023-08-31 DIAGNOSIS — E78.2 MIXED HYPERLIPIDEMIA: ICD-10-CM

## 2023-08-31 PROCEDURE — 99215 OFFICE O/P EST HI 40 MIN: CPT

## 2023-08-31 RX ORDER — FENTANYL 25 UG/1
1 PATCH TRANSDERMAL
Qty: 10 PATCH | Refills: 0 | Status: SHIPPED | OUTPATIENT
Start: 2023-08-31 | End: 2023-11-14

## 2023-08-31 RX ORDER — ROSUVASTATIN CALCIUM 5 MG/1
5 TABLET, COATED ORAL DAILY
Qty: 30 TABLET | Refills: 3 | Status: SHIPPED | OUTPATIENT
Start: 2023-08-31 | End: 2024-05-09

## 2023-08-31 RX ORDER — OXYCODONE HYDROCHLORIDE 5 MG/1
5-10 TABLET ORAL 2 TIMES DAILY PRN
Qty: 120 TABLET | Refills: 0 | Status: SHIPPED | OUTPATIENT
Start: 2023-08-31 | End: 2024-01-08

## 2023-08-31 RX ORDER — FENTANYL 25 UG/1
1 PATCH TRANSDERMAL
COMMUNITY
Start: 2023-08-30 | End: 2023-09-08

## 2023-08-31 RX ORDER — POSACONAZOLE 100 MG/1
300 TABLET, DELAYED RELEASE ORAL EVERY MORNING
Qty: 90 TABLET | Refills: 3 | Status: SHIPPED | OUTPATIENT
Start: 2023-08-31 | End: 2023-11-14

## 2023-08-31 RX ORDER — HYDROXYZINE HYDROCHLORIDE 10 MG/1
TABLET, FILM COATED ORAL
COMMUNITY
Start: 2023-08-31 | End: 2024-01-08

## 2023-08-31 ASSESSMENT — PAIN SCALES - GENERAL: PAINLEVEL: MODERATE PAIN (5)

## 2023-08-31 NOTE — NURSING NOTE
5/24/2023     8:52 AM 6/15/2023     8:56 AM 8/31/2023     8:07 AM   PEG Score   PEG Total Score 4.33 4.67 7

## 2023-08-31 NOTE — PROGRESS NOTES
[facility] Pain Management     Date of visit: 8/31/2023      Assessment:   Nik Nava is a 63 year old male with a past medical history significant for Grave's disease, GVHD s/p bone marrow txp, generalized muscle weakness, ALL in remission, CKD, renal cell carcinoma,  who presents with complaints of low back and RLE pain.      Low back/RLE - Onset of pain occurred early March 2023 after fall on ice, had low back and tailbone pain. He then went to ED on 3/30/23 with worsening pain and was found to have epidural space abscess, discitis, osteomyelitis. He is currently following with ED for extended antibiotic course. Of note, he has hx of ALL (in remission), s/p BMT. Lumbar MRIs on 3/30/23 and 4/28/23, in addition to acute red flag findings, noted multilevel degenerative changes. He continues to have significant low back and RLE pain that extends down lateral aspect of leg into ankle. Denies foot paraesthesias, no leg weakness, no other red flag symptoms. On exam, mild TTP in lumbosacral area, focal tenderness of left lower lumbar, no red flag findings. Etiology of pain is likely multifactorial, including osteomyelitis, epidural abscess, underlying degenerative changes of spine, with overlying myofascial component to an extent.     Visit Diagnoses:  1. Abscess in epidural space of lumbar spine    2. Right hip pain    3. 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 Lumbar spine pain    5. Radicular pain of right lower extremity        Plan:  Diagnosis reviewed, treatment option addressed, and risk/benifits discussed.  Self-care instructions given.  I am recommending a multidisciplinary treatment plan to help this patient better manage their pain.      Pain Physical Therapy:  NO   Continue activity as tolerated at home. May consider pain PT referral in future.      Pain Psychologist to address relaxation, behavioral change, coping style, and other factors important to improvement.  NO      Diagnostic Studies:  Reviewed lumbar MRI in chart - demonstrated multilevel degenerative changes, discitis, epidural space abscess and osteomyelitis.     Medication Management:  Belbuca stopped since last visit. Out of pocket cost too high. Trial of gabapentin recommended by neurosurgery in lieu of Belbuca, though gabapentin 300 mg TID ineffective. He was started on Fentanyl 25 mcg/hr patch while recently inpatient ([facility]). Advised that this is not a medication I generally recommend, and I advised against dosage increase. Continue at 25 mcg/hr dose. Prescription sent into pharmacy today.   Continue oxycodone 5-10 mg up to twice daily as needed for breakthrough pain. New prescription sent into pharmacy today for #120 tabs to last 30 days.   Naloxone - Prescription sent into pharmacy at prior visit. He reports having this on hand at home.   Controlled substance agreement and UDS completed on 5/12/23.   Apply diclofenac gel to painful joints at least 2 x day, ideally 3-4 x day. This medication works best when used consistently.   Hydroxyzine 10 mg at bedtime as needed for pain and sleep. No refills today, added to med list, has leftover home supply.   Advised to contact clinic in between visits if struggling with pain control. Will recommend starting gabapentin 300 mg and titrate to goal dose 600 mg three times daily. He was previously taking 300 mg TID and did not appreciate      Potential procedures:   Deferred - He had lumbar injection through external neurosurgery clinic that was not helpful. No injection recommendations at this point, as he is on IV antibiotics for epidural abscess.      Other Orders/Referrals:   None      Follow up with PEE Fernandes CNP on 9/14/23 at 8:30 am via video visit.       Review of Electronic Chart: Today I have also reviewed available medical information in the patient's medical record at [facility] ([facility]) and Care Everywhere (if available), including relevant  provider notes, laboratory work, and imaging.     Akilah Shah, DOMINIC, APRN, AGNP-C  [facility] Management     -------------------------------------------------    Subjective:    Chief complaint:   Chief Complaint   Patient presents with    Pain       Interval history:  Nik Nava is a 63 year old male last seen on 7/10/23.  They are a patient of mine seen in follow up.     Recommendations/plan at the last visit included:  Pain Physical Therapy:  NO   Continue PT per neurosurgery recommendation for now, activity as tolerated at home.      Pain Psychologist to address relaxation, behavioral change, coping style, and other factors important to improvement.  NO     Diagnostic Studies:  Reviewed lumbar MRI in chart - demonstrated multilevel degenerative changes, discitis, epidural space abscess and osteomyelitis.     Medication Management:  Belbuca -at last visit, recommended increase to 450 mcg twice daily.  He reach out in between visits and advised that he is recently seen external neurosurgeon, who recommended that he taper off of Belbuca and oxycodone prior to injection this week on Wednesday.  Nik has been working on tapering down on dosage, currently taking 150 mcg twice daily.  We had a discussion regarding longer-term plan with Belbuca, pending outcome from injection and his decision to pursue surgery.  Advised that he could try dropping down to 150 mcg daily for the next 2 days, then hold morning of procedure.  We will need to have a follow-up appointment to discuss resuming Belbuca, advised that he may continue oxycodone as needed until then.  Continue oxycodone 5 mg up to twice daily as needed for breakthrough pain.   No refills today, advised to let me know if he needs one in between visits.   Naloxone - Prescription sent into pharmacy at prior visit. Advised to  to have on hand at home in case of accidental opioid overdose.   Controlled substance agreement and UDS completed on  5/12/23.   Apply diclofenac gel to painful joints at least 2 x day, ideally 3-4 x day. This medication works best when used consistently. Monitor for benefit over the next 1-2 weeks.      Potential procedures:   Deferred - He recently saw external neurosurgery, reports injection scheduled this week on Wednesday, may be considering surgery.      Other Orders/Referrals:   None      Follow up with PEE Fernandes CNP after injection [facility], if he plans to pursue surgery. Otherwise, schedule follow up to discuss resuming Belbuca.     Since his last visit, Nik Nava reports:  -his pain is worse than it was at last visit.   -He was inpatient at [facility], epidural infection recurrence.   -He had tapered off Belbuca in between visits at the recommendation of neurosurgery.   -He started gabapentin 300 mg TID, but he did not appreciate analgesic benefit.   -He was started on fentanyl patch in hospital, gabapentin discontinued due to lack of benefit.   -Current fentanyl dosage 25 mcg/hr patch, increased and changed yesterday, so not at therapeutic level quite yet.   -He continues oxycodone for breal through pain.   -Surgery not recommended at this point.   -He took hydroxyzine for pain and sleep in the spring with prior injection.   -He is on IV antibiotics now at home.   -He had LESI with neurosurgery that was not helpful at all.       Pain Information:   Pain rating: averages 5/10 on a 0-10 scale.      Interval history from last visit on 7/10/23:  -He saw neurosurgeon since last visit, who advised him to taper off Belbuca and oxycodone.   -He has been working on reducing dosage on his own, down to Belbuca 150 mcg BID.   -He states he is having injection on Wednesday with neurosurgery.   -He is going to wait until after injection to decide how to proceed with surgery and possibly medications.   -His last dose of oxycodone was 4-5 days ago.   -He may be interested in resuming Belbuca, pending  "outcome from injection.   -He is not sure if he wants to pursue surgery.   -He reports surgeon recommended possibly gabapentin to help with nerve pain.   -He is interested in possibly exploring this, pending outcome from injection.   Pain Information:              Pain rating: averages 3/10 on a 0-10 scale.        Interval history from last visit on 6/15/23:  -his pain is about the same as it was at last visit.   -He notes he has been more active recently.   -Belbuca was increased at last visit to 300 mcg, has not appreciated improvement in pain.   -He notes his increased activity is impacting pain levels.   -He uses oxycodone 5 mg 1-2 x day PRN for breakthrough pain.   -He does not like to use oxycodone due to side effects.   -He has upcoming appointment with neurosurgery, states they will be doing another MRI.   -He states   -He has some mouth irritation from buccal film.   -No side effects   Pain Information:              Pain rating: averages 5/10 on a 0-10 scale.        Interval history from last visit on 5/24/23:  -his pain is about the same as it was at last visit.   -He was started on Belbuca 150 mcg BID at last visit.   -He was previously taking Oxycontin BID that caused marked sedation.   -He states things are \"okay\" since starting Belbuca, but he is having some oral irritation.   -He has hx of GVHD in his mouth, no open sores but he is concerned about this possibility.  -He has not appreciated significant benefit for pain with Belbuca 150 mcg BID.   -He has noticed improvement in mental clarity.  -No other side effects aside from mild oral irritation.   -He was given oxycodone 5 mg #20 tabs at last visit for breakthrough pain, reports he has only used 1 tablet of this prescription.   -He uses diclofenac gel on low back, right hip and RLE.   -He requests refill for diclofenac gel.   -He finishes long term abx tomorrow.   -He will be following up with Infectious Disease and neurosurgery soon.   -His UDS was " "positive for tramadol, which he was given prior. Denies tramadol use since last visit.   -We discussed [facility] drug drop boxes for old medications.   -His wife Lamar is present for the visit today.   Pain Information:              Pain rating: averages 2-3/10 on a 0-10 scale.           HPI from initial visit with me on 5/12/23:  Nik Nava is a 63 year old male presents with a chief complaint of low back and right hip pain, .      The pain has been present for 2+ months .    The pain is Extreme Pain (9) in severity.    The pain is described as throbbing in low back and thigh, \"zingers\" down the leg.   The pain is alleviated by meds (oxycontin), rest/lying down.    It is exacerbated by prolonged sitting and walking, driving.    Modalities that have been utilized in the past which were helpful include oxycodone while inpatient.    Things that were not helpful, but tried ,include tramadol while inpatient, .    The patient has never tried pain PT, injections (cannot do right now due to osteomyelitis).  The patient otherwise denies bowel or bladder incontinence, parasthesias, weakness, saddle anesthesia, unintentional weight loss, or fever/chills/sweats.   Nik Nava has not been seen at a pain clinic in the past.  He had inpatient consult while hospitalized 3/30 ([facility])  -He had fall on ice 3/5/23, tailbone pain initially, then that has improved but continues to have low back and leg pain.   -He went to ED and admitted to hospital ([facility]) for osetomyelitis in the low back.   -He had pain team consult while inpatient, was started on oxycodone, tramadol, and hydroxyzine.   -Denies paraesthesias in feet.   -Denies weakness.   -He follows with Inf Dis, still on antibiotics.   -He finds MS contin helpful but he has significant sedation.   -His wife notes improvement in functioning after starting Oxycontin.   -He has home PT right now, chiropractor in the past.   -Oxycodone 5 mg at bedtime PRN prior to " fall/infection  -Daughter Luis Miguel is present for visit, wife Lamar on the phone.         Current Pain Treatments:    Medications:                             Tylenol 1000 mg       Fentanyl 25 mcg/hr patch q72h              Oxycodone 5 mg BID PRN               Naloxone - sent into pharmacy at prior visit     2. Other therapies:               Inf Disease - currently on antibiotic therapy               Home PT     Current MME: 0-15     Review of [place] Prescription Monitoring Program (): No concern for abuse or misuse of controlled medications based on this report. Reviewed - appears appropriate.      Annual Controlled Substance Agreement/UDS due date: Completed on 5/12/23     Past pain treatments:  Surgery   Belbuca 150 mcg BID       Medications:  Current Outpatient Medications   Medication Sig Dispense Refill    acetaminophen (TYLENOL) 500 MG tablet       acyclovir (ZOVIRAX) 800 MG tablet Take 1 tablet (800 mg) by mouth 2 times daily 60 tablet 4    Belumosudil Mesylate (REZUROCK) 200 MG TABS Take 1 tablet by mouth daily 30 tablet 3    Carboxymethylcell-Glycerin PF (REFRESH RELIEVA PF) 0.5-1 % SOLN Apply 1 drop to eye 4 times daily Preservative free. Either PF multidose bottle or PF vials 30 each 11    carboxymethylcellulose PF (CARBOXYMETHYLCELLULOSE SODIUM) 0.5 % ophthalmic solution Place 1 drop into both eyes 4 times daily 70 each 11    dexamethasone alcohol-free (DECADRON) 0.1 MG/ML solution Swish and spit 20 mLs (2 mg) in mouth 4 times daily (Patient taking differently: Swish and spit 2 mg in mouth 4 times daily Only using twice daily) 1000 mL 3    diclofenac (VOLTAREN) 1 % topical gel Apply 4 g topically 4 times daily 350 g 1    escitalopram (LEXAPRO) 10 MG tablet Take 1 tablet (10 mg) by mouth daily 30 tablet 3    fentaNYL (DURAGESIC) 25 mcg/hr 72 hr patch Place 1 patch onto the skin every 72 hours      fentaNYL (DURAGESIC) 25 mcg/hr 72 hr patch Place 1 patch onto the skin every 72 hours remove old patch. 10  patch 0    fluorometholone (FML LIQUIFILM) 0.1 % ophthalmic suspension Place 1 drop into both eyes daily 5 mL 3    hydrOXYzine (ATARAX) 10 MG tablet Take 10 mg at bedtime PRN for pain and sleep      levothyroxine (SYNTHROID/LEVOTHROID) 137 MCG tablet Take 1 tablet (137 mcg) by mouth daily 90 tablet 4    LORazepam (ATIVAN) 1 MG tablet Take 1 tablet (1 mg) by mouth nightly as needed for anxiety or sleep 30 tablet 3    magnesium oxide (MAG-OX) 400 MG tablet Take 1 tablet (400 mg) by mouth 2 times daily 120 tablet 1    naloxone (NARCAN) 4 MG/0.1ML nasal spray Spray 1 spray (4 mg) into one nostril alternating nostrils as needed for opioid reversal every 2-3 minutes until assistance arrives 0.2 mL 0    nicotine polacrilex (NICORETTE) 4 MG gum Take 4 mg by mouth as needed for smoking cessation       Omega-3 Fish Oil 500 MG capsule Take 2 capsules (1,000 mg) by mouth daily 120 capsule 3    omeprazole (PRILOSEC) 40 MG DR capsule Take 1 capsule (40 mg) by mouth daily 30 capsule 3    oxyCODONE (ROXICODONE) 5 MG tablet Take 1-2 tablets (5-10 mg) by mouth 2 times daily as needed for pain (max 4 tabs/day) Fill 8/23/2023 start 8/24/2023. 120 tablet 0    posaconazole (NOXAFIL) 100 MG EC tablet Take 3 tablets (300 mg) by mouth every morning 90 tablet 3    predniSONE (DELTASONE) 1 MG tablet Take 4 tablets (4 mg) by mouth daily Take on even days 120 tablet 0    predniSONE (DELTASONE) 5 MG tablet Take 2 tablets (10 mg) by mouth every other day Take on odd days 60 tablet 3    rosuvastatin (CRESTOR) 5 MG tablet Take 1 tablet (5 mg) by mouth daily 30 tablet 3    sennosides (SENOKOT) 8.6 MG tablet Take 1 tablet by mouth 3 times daily as needed for constipation 90 tablet 0    sodium chloride 0.9 % neb solution 3 mLs by Other route 2 times daily 300 mL 11    UNABLE TO FIND Take 1,200 mg by mouth daily MEDICATION NAME: Flaxseed Oil      erythromycin (ROMYCIN) 5 MG/GM ophthalmic ointment Place 0.5 inches into both eyes At Bedtime (Patient not  taking: Reported on 8/15/2023) 3.5 g 4    ferrous sulfate (FE TABS) 325 (65 Fe) MG EC tablet Take 325 mg by mouth      ondansetron (ZOFRAN ODT) 8 MG ODT tab       oxyCODONE-acetaminophen (PERCOCET) 5-325 MG tablet Take 1 tablet by mouth daily      zinc 50 MG TABS Take 100 mg by mouth daily Take 100 mg daily for 6 weeks (Patient not taking: Reported on 8/7/2023) 82 tablet 0       Medical History: any changes in medical history since they were last seen? Yes - hospital admission 8/26-8/30, lumbar epidural space abscess.      Objective:    Physical Exam:  Blood pressure 122/74, pulse 69.  Constitutional: Well developed, well nourished, appears stated age.  Gait: Antalgic  HEENT: Head atraumatic, normocephalic. Eyes without conjunctival injection or jaundice. Oropharynx clear. Neck supple. No obvious neck masses.  Skin: No rash, lesions, or petechiae of exposed skin.   Psychiatric/mental status: Alert, without lethargy or stupor. Speech fluent. Appropriate affect. Mood normal. Able to follow commands without difficulty.     Diagnostic Tests/Imaging/Labs:  Creatinine and hepatic panel 8/29/23 - GFR 74, hepatic function intact      BILLING TIME DOCUMENTATION:   The total TIME spent on this patient on the date of the encounter/appointment was 44 minutes.      TOTAL TIME includes:   Time spent preparing to see the patient (reviewing records and tests)   Time spent face to face (or over the phone) with the patient   Time spent ordering tests, medications, procedures and referrals   Time spent Referring and communicating with other healthcare professionals   Time spent documenting clinical information in Epic

## 2023-08-31 NOTE — NURSING NOTE
"Oncology Rooming Note    September 6, 2022 2:54 PM   Nik Nava is a 62 year old male who presents for:    Chief Complaint   Patient presents with     Port Draw     Labs drawn from port by rn.  VS taken.     Oncology Clinic Visit     Status post bone marrow transplant      Initial Vitals: BP (!) 150/88 (BP Location: Right arm, Patient Position: Sitting, Cuff Size: Adult Large)   Pulse 77   Temp 97.9  F (36.6  C) (Oral)   Resp 16   Wt 112.4 kg (247 lb 12.8 oz)   SpO2 97%   BMI 32.69 kg/m   Estimated body mass index is 32.69 kg/m  as calculated from the following:    Height as of 8/18/22: 1.854 m (6' 1\").    Weight as of this encounter: 112.4 kg (247 lb 12.8 oz). Body surface area is 2.41 meters squared.  Moderate Pain (5) Comment: Data Unavailable   No LMP for male patient.  Allergies reviewed: Yes  Medications reviewed: Yes    Medications: Medication refills not needed today.  Pharmacy name entered into [employer]:    [facility] - [place], [place] - [id number] [facility] [place], [place] - [id number]    Clinical concerns: Pt needs refill for dexamethazone elixir. Pt experiencing back pain x3 weeks. Would like to discuss some of the result for labs.     Avila Tobar was notified.      Deshawn Chaudhary" Rituxan Pregnancy And Lactation Text: This medication is Pregnancy Category C and it isn't know if it is safe during pregnancy. It is unknown if this medication is excreted in breast milk but similar antibodies are known to be excreted.

## 2023-08-31 NOTE — PATIENT INSTRUCTIONS
Pain Physical Therapy:  NO   Continue activity as tolerated at home. May consider pain PT referral in future.      Pain Psychologist to address relaxation, behavioral change, coping style, and other factors important to improvement.  NO     Diagnostic Studies:  Reviewed lumbar MRI in chart - demonstrated multilevel degenerative changes, discitis, epidural space abscess and osteomyelitis.     Medication Management:  Belbuca stopped since last visit. Trial of gabapentin recommended by neurosurgery in lieu of Belbuca. He was started on Fentanyl 25 mcg/hr patch while recently inpatient ([facility]). Advised that this is not a medication I generally recommend, and I advised against dosage increase. Continue at 25 mcg/hr dose. Prescription sent into pharmacy today.   Continue oxycodone 5-10 mg up to twice daily as needed for breakthrough pain. New prescription sent into pharmacy today for #120 tabs to last 30 days.   Naloxone - Prescription sent into pharmacy at prior visit. He reports having this on hand at home.   Controlled substance agreement and UDS completed on 5/12/23.   Apply diclofenac gel to painful joints at least 2 x day, ideally 3-4 x day. This medication works best when used consistently.   Hydroxyzine 10 mg at bedtime as needed for pain and sleep.      Potential procedures:   Deferred - He had lumbar injection through external neurosurgery clinic that was not helpful. No injection recommendations at this point, as he is on IV antibiotics for epidural abscess.      Other Orders/Referrals:   None      Follow up with PEE Fernandes CNP on 9/14/23 at 8:30 am via video visit.     ----------------------------------------------------------------  Clinic Number:  [phone]   Call with any questions about your care and for scheduling assistance.   Calls are returned Monday through Friday between 8 AM and 4:30 PM. We usually get back to you within 2 business days depending on the issue/request.    If we are  prescribing your medications:  For opioid medication refills, call the clinic or send a Tutor[employer] message 7 days in advance.  Please include:  Name of requested medication  Name of the pharmacy.  For non-opioid medications, call your pharmacy directly to request a refill. Please allow 3-4 days to be processed.   Per [place] State Law:  All controlled substance prescriptions must be filled within 30 days of being written.    For those controlled substances allowing refills, pickup must occur within 30 days of last fill.      We believe regular attendance is key to your success in our program!    Any time you are unable to keep your appointment we ask that you call us at least 24 hours in advance to cancel.This will allow us to offer the appointment time to another patient.   Multiple missed appointments may lead to dismissal from the clinic.

## 2023-08-31 NOTE — PROGRESS NOTES
08/31/23 0807   PEG: A Thee-Item Scale Assessing Pain Intensity and Interference        0 = No pain / No interference    10 = Pain as bad as you can imagine / Completely interferes   What number best describes your pain on average in the past week? 5   What number best describes how, during the past week, pain has interfered with your enjoyment of life? 8   What number best describes how, during the past week, pain has interfered with your general activity? 8   PEG Total Score 7

## 2023-09-05 ENCOUNTER — TELEPHONE (OUTPATIENT)
Dept: PALLIATIVE MEDICINE | Facility: CLINIC | Age: 63
End: 2023-09-05
Payer: MEDICARE

## 2023-09-05 NOTE — TELEPHONE ENCOUNTER
[facility] Call Center    Phone Message    May a detailed message be left on voicemail: yes     Reason for Call: Other: Queen-NP calling to discuss continuity of care regarding Pt, Pt is currently admitted and NP would like to discuss after care with Akilah Shah. Please call back      Action Taken: Message routed to:  Other: [facility]    Travel Screening: Not Applicable

## 2023-09-05 NOTE — TELEPHONE ENCOUNTER
Call returned to inpatient pain provider, Queen CONCHITA. He is inpatient at [facility] due to uncontrolled pain. Fentanyl was increased to 50 mcg/hr patch, continues oxycodone 10 mg 4-5 x day. Advised I am okay to continue with the understanding this is on a temporary basis. Ideally, I recommend he look for pain provider within [facility], as he follows with [facility] primary care for longer term management, if indicated. His next follow up with me is 9/14/23.     Akilah Shah, DOMINIC, APRN, AGNP-C  [facility] Management

## 2023-09-12 ASSESSMENT — PAIN SCALES - PAIN ENJOYMENT GENERAL ACTIVITY SCALE (PEG)
AVG_PAIN_PASTWEEK: 6
PEG_TOTALSCORE: 6
INTERFERED_ENJOYMENT_LIFE: 6
INTERFERED_GENERAL_ACTIVITY: 6

## 2023-09-13 RX ORDER — ERTAPENEM 1 G/1
1 INJECTION, POWDER, LYOPHILIZED, FOR SOLUTION INTRAMUSCULAR; INTRAVENOUS EVERY 24 HOURS
COMMUNITY
Start: 2023-09-05 | End: 2023-10-09

## 2023-09-13 RX ORDER — METHOCARBAMOL 750 MG/1
750 TABLET, FILM COATED ORAL PRN
COMMUNITY
Start: 2023-09-06 | End: 2024-01-08

## 2023-09-13 RX ORDER — POLYETHYLENE GLYCOL 3350 17 G/17G
1 POWDER, FOR SOLUTION ORAL
COMMUNITY
End: 2023-11-14

## 2023-09-13 RX ORDER — VANCOMYCIN HCL IN 5 % DEXTROSE 1.25 G/25
1250 PLASTIC BAG, INJECTION (ML) INTRAVENOUS EVERY 12 HOURS
COMMUNITY
Start: 2023-09-05 | End: 2023-10-09

## 2023-09-13 NOTE — PROGRESS NOTES
6/15/2023     8:56 AM 8/31/2023     8:07 AM 9/13/2023     3:37 PM   PEG Score   PEG Total Score 4.67 7 5.33    Nik is a 63 year old who is being evaluated via a billable video visit.      How would you like to obtain your AVS? MyChart  If the video visit is dropped, the invitation should be resent by: Send to e-mail at: [email or web address]  Will anyone else be joining your video visit? Yes, Lamar, his wife   Is Pt currently in [place]? Yes    NOTE:  If Pt is not in [place], Appointment needs to be canceled and rescheduled.

## 2023-09-13 NOTE — PATIENT INSTRUCTIONS
Pain Physical Therapy:  NO   Continue activity as tolerated at home. May consider pain PT referral in future.      Pain Psychologist to address relaxation, behavioral change, coping style, and other factors important to improvement.  NO     Diagnostic Studies:  Reviewed lumbar MRI in chart - demonstrated multilevel degenerative changes, discitis, epidural space abscess and osteomyelitis.     Medication Management:  Fentanyl increased by [facility] inpatient pain team to 50 mcg/hr patch. I spoke with [facility] pain provider prior to hospital discharge 9/6/23. Today, I advised that we will plan to continue on a short term basis until IV antibiotics are completed (as of right now, estimated end date 10/9/23), then recommend he begin tapering or consider more ideal longer-term medications.  Updated prescription for #5 patches sent in to pharmacy today.  Stop oxycodone.  He reports significant cognitive side effects that are unpleasant.  Start hydrocodone 5-325 mg, 1-2 tabs up to 3 times daily as needed, max 6 tabs per day.  Advised to let me know in between visits if he does not appreciate equivalent pain benefit to oxycodone, but he is not to resume oxycodone without speaking to clinic first.  He and wife verbalized understanding of this plan.  New hydrocodone prescription for #84 tablets sent in to pharmacy today.  Advised to bring all remaining oxycodone and hydrocodone to next visit.  Reports #1 fentanyl 25 mcg/h patch leftover at home, advised to keep at home for future weaning.  Naloxone - Prescription sent into pharmacy at prior visit. He reports having this on hand at home.   Controlled substance agreement and UDS completed on 5/12/23.   Apply diclofenac gel to painful joints at least 2 x day, ideally 3-4 x day. This medication works best when used consistently.   Hydroxyzine 10 mg at bedtime as needed for pain and sleep. No refills today, added to med list, has leftover home supply.   Advised at last visit to  contact clinic in between visits if struggling with pain control. Will recommend starting gabapentin 300 mg and titrate to goal dose 600 mg three times daily. He was previously taking 300 mg TID and did not appreciate analgesic benefit.  I stand by this recommendation discussed at last visit, specifically that we will need to consider neuropathic agents for better pain control and lieu of escalating opioid doses.     Potential procedures:   Deferred - He had lumbar injection through external neurosurgery clinic that was not helpful. No injection recommendations at this point, as he is on IV antibiotics for epidural abscess.      Other Orders/Referrals:   None      Follow up with PEE Fernandes CNP on 9/2723 at 11:30 am for in person visit.      ----------------------------------------------------------------  Clinic Number:  [phone]   Call with any questions about your care and for scheduling assistance.   Calls are returned Monday through Friday between 8 AM and 4:30 PM. We usually get back to you within 2 business days depending on the issue/request.    If we are prescribing your medications:  For opioid medication refills, call the clinic or send a [employer] message 7 days in advance.  Please include:  Name of requested medication  Name of the pharmacy.  For non-opioid medications, call your pharmacy directly to request a refill. Please allow 3-4 days to be processed.   Per [place] State Law:  All controlled substance prescriptions must be filled within 30 days of being written.    For those controlled substances allowing refills, pickup must occur within 30 days of last fill.      We believe regular attendance is key to your success in our program!    Any time you are unable to keep your appointment we ask that you call us at least 24 hours in advance to cancel.This will allow us to offer the appointment time to another patient.   Multiple missed appointments may lead to dismissal from the clinic.

## 2023-09-14 ENCOUNTER — VIRTUAL VISIT (OUTPATIENT)
Dept: PALLIATIVE MEDICINE | Facility: CLINIC | Age: 63
End: 2023-09-14
Payer: COMMERCIAL

## 2023-09-14 DIAGNOSIS — M54.50 LUMBAR SPINE PAIN: ICD-10-CM

## 2023-09-14 DIAGNOSIS — D89.813 GVHD AS COMPLICATION OF BONE MARROW TRANSPLANT (H): ICD-10-CM

## 2023-09-14 DIAGNOSIS — T86.09 GVHD AS COMPLICATION OF BONE MARROW TRANSPLANT (H): ICD-10-CM

## 2023-09-14 DIAGNOSIS — M25.551 RIGHT HIP PAIN: ICD-10-CM

## 2023-09-14 DIAGNOSIS — M54.10 RADICULAR PAIN OF RIGHT LOWER EXTREMITY: ICD-10-CM

## 2023-09-14 DIAGNOSIS — G06.1 ABSCESS IN EPIDURAL SPACE OF LUMBAR SPINE: Primary | ICD-10-CM

## 2023-09-14 PROCEDURE — 99215 OFFICE O/P EST HI 40 MIN: CPT | Mod: VID

## 2023-09-14 RX ORDER — HYDROCODONE BITARTRATE AND ACETAMINOPHEN 5; 325 MG/1; MG/1
1-2 TABLET ORAL 3 TIMES DAILY PRN
Qty: 84 TABLET | Refills: 0 | Status: SHIPPED | OUTPATIENT
Start: 2023-09-14 | End: 2023-11-14

## 2023-09-14 RX ORDER — FENTANYL 50 UG/1
1 PATCH TRANSDERMAL
Qty: 5 PATCH | Refills: 0 | Status: SHIPPED | OUTPATIENT
Start: 2023-09-14 | End: 2023-09-27 | Stop reason: DRUGHIGH

## 2023-09-14 NOTE — PROGRESS NOTES
Video-Visit Details    Type of service:  Video Visit     Originating Location (pt. Location): Home    Distant Location (provider location):  On-site  Platform used for Video Visit: [facility]     Date of visit: 9/14/2023      Assessment:   Nik Nava is a 63 year old male with a past medical history significant for Grave's disease, GVHD s/p bone marrow txp, generalized muscle weakness, ALL in remission, CKD, renal cell carcinoma,  who presents with complaints of low back and RLE pain.      Low back/RLE - Onset of pain occurred early March 2023 after fall on ice, had low back and tailbone pain. He then went to ED on 3/30/23 with worsening pain and was found to have epidural space abscess, discitis, osteomyelitis. He is currently following with ED for extended antibiotic course. Of note, he has hx of ALL (in remission), s/p BMT. Lumbar MRIs on 3/30/23 and 4/28/23, in addition to acute red flag findings, noted multilevel degenerative changes. He continues to have significant low back and RLE pain that extends down lateral aspect of leg into ankle. Denies foot paraesthesias, no leg weakness, no other red flag symptoms. On exam, mild TTP in lumbosacral area, focal tenderness of left lower lumbar, no red flag findings. Etiology of pain is likely multifactorial, including osteomyelitis, epidural abscess, underlying degenerative changes of spine, with overlying myofascial component to an extent.     Visit Diagnoses:  1. Abscess in epidural space of lumbar spine    2. 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 Lumbar spine pain    4. Radicular pain of right lower extremity    5. Right hip pain        Plan:  Diagnosis reviewed, treatment option addressed, and risk/benifits discussed.  Self-care instructions given.  I am recommending a multidisciplinary treatment plan to help this patient better manage their pain.      Pain Physical Therapy:  NO   Continue activity as  tolerated at home. May consider pain PT referral in future.      Pain Psychologist to address relaxation, behavioral change, coping style, and other factors important to improvement.  NO     Diagnostic Studies:  Reviewed lumbar MRI in chart - demonstrated multilevel degenerative changes, discitis, epidural space abscess and osteomyelitis.     Medication Management:  Fentanyl increased by [facility] inpatient pain team to 50 mcg/hr patch. I spoke with [facility] pain provider prior to hospital discharge 9/6/23. Today, I advised that we will plan to continue on a short term basis until IV antibiotics are completed (as of right now, estimated end date 10/9/23), then recommend he begin tapering or consider more ideal longer-term medications.  Updated prescription for #5 patches sent in to pharmacy today.  Stop oxycodone.  He reports significant cognitive side effects that are unpleasant.  Start hydrocodone 5-325 mg, 1-2 tabs up to 3 times daily as needed, max 6 tabs per day.  Advised to let me know in between visits if he does not appreciate equivalent pain benefit to oxycodone, but he is not to resume oxycodone without speaking to clinic first.  He and wife verbalized understanding of this plan.  New hydrocodone prescription for #84 tablets sent in to pharmacy today.  Advised to bring all remaining oxycodone and hydrocodone to next visit.  Reports #1 fentanyl 25 mcg/h patch leftover at home, advised to keep at home for future weaning.  Naloxone - Prescription sent into pharmacy at prior visit. He reports having this on hand at home.   Controlled substance agreement and UDS completed on 5/12/23.   Apply diclofenac gel to painful joints at least 2 x day, ideally 3-4 x day. This medication works best when used consistently.   Hydroxyzine 10 mg at bedtime as needed for pain and sleep. No refills today, added to med list, has leftover home supply.   Advised at last visit to contact clinic in between visits if struggling with  pain control. Will recommend starting gabapentin 300 mg and titrate to goal dose 600 mg three times daily. He was previously taking 300 mg TID and did not appreciate analgesic benefit.  I stand by this recommendation discussed at last visit, specifically that we will need to consider neuropathic agents for better pain control and lieu of escalating opioid doses.     Potential procedures:   Deferred - He had lumbar injection through external neurosurgery clinic that was not helpful. No injection recommendations at this point, as he is on IV antibiotics for epidural abscess.      Other Orders/Referrals:   None      Follow up with PEE Fernandes CNP on 9/2723 at 11:30 am for in person visit.      Review of Electronic Chart: Today I have also reviewed available medical information in the patient's medical record at [facility] ([facility]) and Care Everywhere (if available), including relevant provider notes, laboratory work, and imaging.     Akilah Shah DNP, PEE, AGNP-C  [facility]     -------------------------------------------------    Subjective:    Chief complaint:   Chief Complaint   Patient presents with    Pain       Interval history:  Nik Nava is a 63 year old male last seen on 8/31/23.  They are a patient of mine seen in follow up.     Recommendations/plan at the last visit included:  Pain Physical Therapy:  NO   Continue activity as tolerated at home. May consider pain PT referral in future.      Pain Psychologist to address relaxation, behavioral change, coping style, and other factors important to improvement.  NO     Diagnostic Studies:  Reviewed lumbar MRI in chart - demonstrated multilevel degenerative changes, discitis, epidural space abscess and osteomyelitis.     Medication Management:  Belbuca stopped since last visit. Out of pocket cost too high. Trial of gabapentin recommended by neurosurgery in lieu of Belbuca, though gabapentin 300 mg TID ineffective. He was  started on Fentanyl 25 mcg/hr patch while recently inpatient ([facility]). Advised that this is not a medication I generally recommend, and I advised against dosage increase. Continue at 25 mcg/hr dose. Prescription sent into pharmacy today.   Continue oxycodone 5-10 mg up to twice daily as needed for breakthrough pain. New prescription sent into pharmacy today for #120 tabs to last 30 days.   Naloxone - Prescription sent into pharmacy at prior visit. He reports having this on hand at home.   Controlled substance agreement and UDS completed on 5/12/23.   Apply diclofenac gel to painful joints at least 2 x day, ideally 3-4 x day. This medication works best when used consistently.   Hydroxyzine 10 mg at bedtime as needed for pain and sleep. No refills today, added to med list, has leftover home supply.   Advised to contact clinic in between visits if struggling with pain control. Will recommend starting gabapentin 300 mg and titrate to goal dose 600 mg three times daily. He was previously taking 300 mg TID and did not appreciate      Potential procedures:   Deferred - He had lumbar injection through external neurosurgery clinic that was not helpful. No injection recommendations at this point, as he is on IV antibiotics for epidural abscess.      Other Orders/Referrals:   None      Follow up with PEE Fernandes CNP on 9/14/23 at 8:30 am via video visit.     Since his last visit, Nik Nava reports:  -his pain is about the same as it was at last visit.   -He was in hospital again due to pain from epidural abscess infection.   -He continues to struggle with pain overnight.   -Per wife we took tylenol and muscle relaxant overnight and this helped.   -He uses hydroxyzine PRN as well for sleep.   -Fentanyl patch was increased to 50 mcg/hr with recent hospital visit.   -He continues to take oxycodone, current dosage 10 mg BID for breakthrough pain, total 20 mg/day.  -He states he does not like side effects with  oxycodone, wondering about different opioid.   -He is open to trial hydrocodone  -He may be interested medical cannabis to help reduce opioid usage, should pain persist beyond infection tx.   -He is using miralax to help with constipation.    Pain Information:   Pain rating: averages 5/10 on a 0-10 scale.      Interval history from last visit on 8/31/23:  -his pain is worse than it was at last visit.   -He was inpatient at [facility], epidural infection recurrence.   -He had tapered off Belbuca in between visits at the recommendation of neurosurgery.   -He started gabapentin 300 mg TID, but he did not appreciate analgesic benefit.   -He was started on fentanyl patch in hospital, gabapentin discontinued due to lack of benefit.   -Current fentanyl dosage 25 mcg/hr patch, increased and changed yesterday, so not at therapeutic level quite yet.   -He continues oxycodone for breal through pain.   -Surgery not recommended at this point.   -He took hydroxyzine for pain and sleep in the spring with prior injection.   -He is on IV antibiotics now at home.   -He had LESI with neurosurgery that was not helpful at all.   Pain Information:              Pain rating: averages 5/10 on a 0-10 scale.        Interval history from last visit on 7/10/23:  -He saw neurosurgeon since last visit, who advised him to taper off Belbuca and oxycodone.   -He has been working on reducing dosage on his own, down to Belbuca 150 mcg BID.   -He states he is having injection on Wednesday with neurosurgery.   -He is going to wait until after injection to decide how to proceed with surgery and possibly medications.   -His last dose of oxycodone was 4-5 days ago.   -He may be interested in resuming Belbuca, pending outcome from injection.   -He is not sure if he wants to pursue surgery.   -He reports surgeon recommended possibly gabapentin to help with nerve pain.   -He is interested in possibly exploring this, pending outcome from injection.   Pain  "Information:              Pain rating: averages 3/10 on a 0-10 scale.        Interval history from last visit on 6/15/23:  -his pain is about the same as it was at last visit.   -He notes he has been more active recently.   -Belbuca was increased at last visit to 300 mcg, has not appreciated improvement in pain.   -He notes his increased activity is impacting pain levels.   -He uses oxycodone 5 mg 1-2 x day PRN for breakthrough pain.   -He does not like to use oxycodone due to side effects.   -He has upcoming appointment with neurosurgery, states they will be doing another MRI.   -He states   -He has some mouth irritation from buccal film.   -No side effects   Pain Information:              Pain rating: averages 5/10 on a 0-10 scale.        Interval history from last visit on 5/24/23:  -his pain is about the same as it was at last visit.   -He was started on Belbuca 150 mcg BID at last visit.   -He was previously taking Oxycontin BID that caused marked sedation.   -He states things are \"okay\" since starting Belbuca, but he is having some oral irritation.   -He has hx of GVHD in his mouth, no open sores but he is concerned about this possibility.  -He has not appreciated significant benefit for pain with Belbuca 150 mcg BID.   -He has noticed improvement in mental clarity.  -No other side effects aside from mild oral irritation.   -He was given oxycodone 5 mg #20 tabs at last visit for breakthrough pain, reports he has only used 1 tablet of this prescription.   -He uses diclofenac gel on low back, right hip and RLE.   -He requests refill for diclofenac gel.   -He finishes long term abx tomorrow.   -He will be following up with Infectious Disease and neurosurgery soon.   -His UDS was positive for tramadol, which he was given prior. Denies tramadol use since last visit.   -We discussed [facility] drug drop boxes for old medications.   -His wife Lamar is present for the visit today.   Pain Information:              Pain rating: " "averages 2-3/10 on a 0-10 scale.           HPI from initial visit with me on 5/12/23:  Nik Nava is a 63 year old male presents with a chief complaint of low back and right hip pain, .      The pain has been present for 2+ months .    The pain is Extreme Pain (9) in severity.    The pain is described as throbbing in low back and thigh, \"zingers\" down the leg.   The pain is alleviated by meds (oxycontin), rest/lying down.    It is exacerbated by prolonged sitting and walking, driving.    Modalities that have been utilized in the past which were helpful include oxycodone while inpatient.    Things that were not helpful, but tried ,include tramadol while inpatient, .    The patient has never tried pain PT, injections (cannot do right now due to osteomyelitis).  The patient otherwise denies bowel or bladder incontinence, parasthesias, weakness, saddle anesthesia, unintentional weight loss, or fever/chills/sweats.   Nik Nava has not been seen at a pain clinic in the past.  He had inpatient consult while hospitalized 3/30 ([facility])  -He had fall on ice 3/5/23, tailbone pain initially, then that has improved but continues to have low back and leg pain.   -He went to ED and admitted to hospital ([facility]) for osetomyelitis in the low back.   -He had pain team consult while inpatient, was started on oxycodone, tramadol, and hydroxyzine.   -Denies paraesthesias in feet.   -Denies weakness.   -He follows with Inf Dis, still on antibiotics.   -He finds MS contin helpful but he has significant sedation.   -His wife notes improvement in functioning after starting Oxycontin.   -He has home PT right now, chiropractor in the past.   -Oxycodone 5 mg at bedtime PRN prior to fall/infection  -Daughter Luis Miguel is present for visit, wife Lamar on the phone.         Current Pain Treatments:    Medications:                             Tylenol 1000 mg                  Fentanyl 25 mcg/hr patch q72h              Oxycodone 5 mg BID PRN    "            Naloxone - sent into pharmacy at prior visit     2. Other therapies:               Inf Disease - currently on antibiotic therapy               Home PT     Current MME: 0-15     Review of [place] Prescription Monitoring Program (): No concern for abuse or misuse of controlled medications based on this report. Reviewed - appears appropriate.      Annual Controlled Substance Agreement/UDS due date: Completed on 5/12/23     Past pain treatments:  Surgery   Belbuca 150 mcg BID       Medications:  Current Outpatient Medications   Medication Sig Dispense Refill    ertapenem (INVANZ) 1 GM vial Inject 1 g into the vein every 24 hours      fentaNYL (DURAGESIC) 50 mcg/hr 72 hr patch Place 1 patch onto the skin every 72 hours remove old patch. 5 patch 0    HYDROcodone-acetaminophen (NORCO) 5-325 MG tablet Take 1-2 tablets by mouth 3 times daily as needed for severe pain 84 tablet 0    methocarbamol (ROBAXIN) 750 MG tablet Take 750 mg by mouth      Vancomycin HCl-Dextrose (VANCOMYCIN HCL IN DEXTROSE) 1.25-5 GM/250ML-% SOLN Inject 1,250 mg into the vein every 12 hours      acetaminophen (TYLENOL) 500 MG tablet       acyclovir (ZOVIRAX) 800 MG tablet Take 1 tablet (800 mg) by mouth 2 times daily 60 tablet 4    Belumosudil Mesylate (REZUROCK) 200 MG TABS Take 1 tablet by mouth daily 30 tablet 3    Carboxymethylcell-Glycerin PF (REFRESH RELIEVA PF) 0.5-1 % SOLN Apply 1 drop to eye 4 times daily Preservative free. Either PF multidose bottle or PF vials 30 each 11    carboxymethylcellulose PF (CARBOXYMETHYLCELLULOSE SODIUM) 0.5 % ophthalmic solution Place 1 drop into both eyes 4 times daily 70 each 11    dexamethasone alcohol-free (DECADRON) 0.1 MG/ML solution Swish and spit 20 mLs (2 mg) in mouth 4 times daily (Patient taking differently: Swish and spit 2 mg in mouth 4 times daily Only using twice daily) 1000 mL 3    diclofenac (VOLTAREN) 1 % topical gel Apply 4 g topically 4 times daily (Patient not taking: Reported  on 9/13/2023) 350 g 1    erythromycin (ROMYCIN) 5 MG/GM ophthalmic ointment Place 0.5 inches into both eyes At Bedtime (Patient not taking: Reported on 8/15/2023) 3.5 g 4    escitalopram (LEXAPRO) 10 MG tablet Take 1 tablet (10 mg) by mouth daily 30 tablet 3    fentaNYL (DURAGESIC) 25 mcg/hr 72 hr patch Place 1 patch onto the skin every 72 hours remove old patch. 10 patch 0    ferrous sulfate (FE TABS) 325 (65 Fe) MG EC tablet Take 325 mg by mouth      fluorometholone (FML LIQUIFILM) 0.1 % ophthalmic suspension Place 1 drop into both eyes daily 5 mL 3    hydrOXYzine (ATARAX) 10 MG tablet Take 10 mg at bedtime PRN for pain and sleep      levothyroxine (SYNTHROID/LEVOTHROID) 137 MCG tablet Take 1 tablet (137 mcg) by mouth daily 90 tablet 4    LORazepam (ATIVAN) 1 MG tablet Take 1 tablet (1 mg) by mouth nightly as needed for anxiety or sleep (Patient not taking: Reported on 9/13/2023) 30 tablet 3    magnesium oxide (MAG-OX) 400 MG tablet Take 1 tablet (400 mg) by mouth 2 times daily 120 tablet 1    naloxone (NARCAN) 4 MG/0.1ML nasal spray Spray 1 spray (4 mg) into one nostril alternating nostrils as needed for opioid reversal every 2-3 minutes until assistance arrives 0.2 mL 0    nicotine polacrilex (NICORETTE) 4 MG gum Take 4 mg by mouth as needed for smoking cessation       Omega-3 Fish Oil 500 MG capsule Take 2 capsules (1,000 mg) by mouth daily 120 capsule 3    omeprazole (PRILOSEC) 40 MG DR capsule Take 1 capsule (40 mg) by mouth daily 30 capsule 3    ondansetron (ZOFRAN ODT) 8 MG ODT tab       oxyCODONE (ROXICODONE) 5 MG tablet Take 1-2 tablets (5-10 mg) by mouth 2 times daily as needed for pain (max 4 tabs/day) Fill 8/23/2023 start 8/24/2023. 120 tablet 0    oxyCODONE-acetaminophen (PERCOCET) 5-325 MG tablet Take 1 tablet by mouth daily      polyethylene glycol (MIRALAX) 17 g packet Take 1 packet by mouth      posaconazole (NOXAFIL) 100 MG EC tablet Take 3 tablets (300 mg) by mouth every morning 90 tablet 3     predniSONE (DELTASONE) 1 MG tablet Take 4 tablets (4 mg) by mouth daily Take on even days 120 tablet 0    predniSONE (DELTASONE) 5 MG tablet Take 2 tablets (10 mg) by mouth every other day Take on odd days 60 tablet 3    rosuvastatin (CRESTOR) 5 MG tablet Take 1 tablet (5 mg) by mouth daily 30 tablet 3    sennosides (SENOKOT) 8.6 MG tablet Take 1 tablet by mouth 3 times daily as needed for constipation 90 tablet 0    sodium chloride 0.9 % neb solution 3 mLs by Other route 2 times daily 300 mL 11    UNABLE TO FIND Take 1,200 mg by mouth daily MEDICATION NAME: Flaxseed Oil      zinc 50 MG TABS Take 100 mg by mouth daily Take 100 mg daily for 6 weeks (Patient not taking: Reported on 8/7/2023) 82 tablet 0       Medical History: any changes in medical history since they were last seen? Yes - ED visit to hospital admission 9/2-9/6 [facility]       Objective:    Physical Exam:  There were no vitals taken for this visit.  GENERAL: Healthy, alert and no distress  EYES: Eyes grossly normal to inspection.  No discharge or erythema, or obvious scleral/conjunctival abnormalities.  RESP: No audible wheeze, cough, or visible cyanosis.  No visible retractions or increased work of breathing.    SKIN: Visible skin clear. No significant rash, abnormal pigmentation or lesions.  NEURO: Cranial nerves grossly intact.  Mentation and speech appropriate for age.  PSYCH: Mentation appears normal, affect normal/bright, judgement and insight intact, normal speech and appearance well-groomed.    Diagnostic Tests/Imaging/Labs:  [facility] 9/2/23 - GFR 77    BILLING TIME DOCUMENTATION:   The total TIME spent on this patient on the date of the encounter/appointment was 40 minutes.      TOTAL TIME includes:   Time spent preparing to see the patient (reviewing records and tests)   Time spent face to face (or over the phone) with the patient   Time spent ordering tests, medications, procedures and referrals   Time spent Referring and communicating with  other healthcare professionals   Time spent documenting clinical information in Epic

## 2023-09-20 LAB
CULTURE HARVEST COMPLETE DATE: NORMAL
INTERPRETATION: NORMAL
ISCN: NORMAL
METHODS: NORMAL

## 2023-09-21 ENCOUNTER — OFFICE VISIT (OUTPATIENT)
Dept: PULMONOLOGY | Facility: CLINIC | Age: 63
End: 2023-09-21
Payer: COMMERCIAL

## 2023-09-21 DIAGNOSIS — C91.01 ACUTE LYMPHOBLASTIC LEUKEMIA (ALL) IN REMISSION (H): ICD-10-CM

## 2023-09-21 DIAGNOSIS — T86.09 GVHD AS COMPLICATION OF BONE MARROW TRANSPLANT (H): Primary | ICD-10-CM

## 2023-09-21 DIAGNOSIS — Z94.81 STATUS POST BONE MARROW TRANSPLANT (H): Primary | ICD-10-CM

## 2023-09-21 DIAGNOSIS — D89.813 GVHD AS COMPLICATION OF BONE MARROW TRANSPLANT (H): Primary | ICD-10-CM

## 2023-09-21 PROCEDURE — 94375 RESPIRATORY FLOW VOLUME LOOP: CPT | Performed by: INTERNAL MEDICINE

## 2023-09-21 PROCEDURE — 94729 DIFFUSING CAPACITY: CPT | Performed by: INTERNAL MEDICINE

## 2023-09-21 PROCEDURE — 99207 PR NO CHARGE LOS: CPT

## 2023-09-21 PROCEDURE — 94642 AEROSOL INHALATION TREATMENT: CPT | Performed by: INTERNAL MEDICINE

## 2023-09-21 PROCEDURE — 94640 AIRWAY INHALATION TREATMENT: CPT | Performed by: INTERNAL MEDICINE

## 2023-09-21 PROCEDURE — 94726 PLETHYSMOGRAPHY LUNG VOLUMES: CPT | Performed by: INTERNAL MEDICINE

## 2023-09-21 RX ORDER — ALBUTEROL SULFATE 0.83 MG/ML
2.5 SOLUTION RESPIRATORY (INHALATION) EVERY 4 HOURS PRN
Status: CANCELLED | OUTPATIENT
Start: 2023-09-21

## 2023-09-21 RX ORDER — PENTAMIDINE ISETHIONATE 300 MG/300MG
300 INHALANT RESPIRATORY (INHALATION)
Status: CANCELLED | OUTPATIENT
Start: 2023-09-21

## 2023-09-21 RX ORDER — PENTAMIDINE ISETHIONATE 300 MG/300MG
300 INHALANT RESPIRATORY (INHALATION)
Status: DISCONTINUED | OUTPATIENT
Start: 2023-09-21 | End: 2023-09-21 | Stop reason: HOSPADM

## 2023-09-21 RX ORDER — ALBUTEROL SULFATE 0.83 MG/ML
2.5 SOLUTION RESPIRATORY (INHALATION) EVERY 4 HOURS PRN
Status: DISCONTINUED | OUTPATIENT
Start: 2023-09-21 | End: 2023-09-21 | Stop reason: HOSPADM

## 2023-09-21 RX ADMIN — PENTAMIDINE ISETHIONATE 300 MG: 300 INHALANT RESPIRATORY (INHALATION) at 13:19

## 2023-09-21 NOTE — PROGRESS NOTES
Nik Nava was seen today for a Pentamidine nebulizer tx ordered by Akshat Kwan.    Patient was first given 4 puffs Albuterol, after which Pentamidine 300 mg (Lot # [id number]) mixed with 6cc Sterile H20 was administered through a filtered nebulizer.    Pre-treatment: SpO2 = 97%   HR = 67   BBS = clear   Post-treatment: SpO2 = 99%  HR = 72  BBS = clear    No adverse side effects noted by the patient.    This service today was provided under the supervision of Dr. Eaton, who was available if needed.     Procedure was completed by Deborah Lopez RRT

## 2023-09-25 LAB
DLCOUNC-%PRED-PRE: 91 %
DLCOUNC-PRE: 26.62 ML/MIN/MMHG
DLCOUNC-PRED: 29.02 ML/MIN/MMHG
ERV-%PRED-PRE: 58 %
ERV-PRE: 0.98 L
ERV-PRED: 1.69 L
EXPTIME-PRE: 9.67 SEC
FEF2575-%PRED-PRE: 73 %
FEF2575-PRE: 2.01 L/SEC
FEF2575-PRED: 2.73 L/SEC
FEFMAX-%PRED-PRE: 102 %
FEFMAX-PRE: 9.73 L/SEC
FEFMAX-PRED: 9.48 L/SEC
FEV1-%PRED-PRE: 91 %
FEV1-PRE: 3.17 L
FEV1FEV6-PRE: 74 %
FEV1FEV6-PRED: 78 %
FEV1FVC-PRE: 71 %
FEV1FVC-PRED: 77 %
FEV1SVC-PRE: 70 %
FEV1SVC-PRED: 66 %
FIFMAX-PRE: 6.55 L/SEC
FVC-%PRED-PRE: 99 %
FVC-PRE: 4.48 L
FVC-PRED: 4.51 L
IC-%PRED-PRE: 106 %
IC-PRE: 3.58 L
IC-PRED: 3.37 L
VA-%PRED-PRE: 97 %
VA-PRE: 6.97 L
VC-%PRED-PRE: 86 %
VC-PRE: 4.56 L
VC-PRED: 5.28 L

## 2023-09-26 ENCOUNTER — VIRTUAL VISIT (OUTPATIENT)
Dept: ONCOLOGY | Facility: CLINIC | Age: 63
End: 2023-09-26
Attending: STUDENT IN AN ORGANIZED HEALTH CARE EDUCATION/TRAINING PROGRAM
Payer: COMMERCIAL

## 2023-09-26 VITALS — HEIGHT: 73 IN | WEIGHT: 244 LBS | BODY MASS INDEX: 32.34 KG/M2

## 2023-09-26 DIAGNOSIS — Z94.81 STATUS POST BONE MARROW TRANSPLANT (H): ICD-10-CM

## 2023-09-26 DIAGNOSIS — R53.81 PHYSICAL DECONDITIONING: ICD-10-CM

## 2023-09-26 DIAGNOSIS — R53.82 CHRONIC FATIGUE: ICD-10-CM

## 2023-09-26 DIAGNOSIS — C91.01 ACUTE LYMPHOBLASTIC LEUKEMIA (ALL) IN REMISSION (H): Primary | ICD-10-CM

## 2023-09-26 PROCEDURE — 99215 OFFICE O/P EST HI 40 MIN: CPT | Mod: VID | Performed by: STUDENT IN AN ORGANIZED HEALTH CARE EDUCATION/TRAINING PROGRAM

## 2023-09-26 NOTE — PATIENT INSTRUCTIONS
1.  Home physical therapy was ordered, and the order will be faxed over to [facility].  2.  A quad cane was ordered for help with assistance when you are transferring and walking for fall prevention.  Locations:  3.  Continue protein supplementation, aiming for an extra 30 to 60 g of protein daily.  4.  Follow-up with Dr. Mcdonough in 3 months for a return virtual visit.

## 2023-09-26 NOTE — LETTER
"    9/26/2023         RE: Nik Nava  [address] [facility] [id number]        Dear Colleague,    Thank you for referring your patient, Nik Nava, to the [facility]. Please see a copy of my visit note below.    Virtual Visit Details    Type of service:  Video Visit     Originating Location (pt. Location): Home    Distant Location (provider location):  On-site  Platform used for Video Visit: [facility]   PM&R clinic note        Interval history:     Nik Nava presents to clinic today for follow up reg his rehab needs.   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   Was last seen in clinic on 8/28/23.  Recommendations included:  Therapy/equipment/braces:  Continue outpatient physical therapy for low back pain.  Continue home exercise regimen as tolerated.  Medications:  Continue current pain medication regimen.  Referral / follow up with other providers:  Follow-up with [facility] orthopedics as planned for further evaluation and management of back injury.  Follow up: 6-month virtual visit.    Oncology History:  Per Dr. Chris Cote BMT note on 12/2022  \"1.  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 S/p allo sib PBSCT. (ABO matched)  HCT-CI score: 3 (prior solid tumor)  Day +100 bone marrow biopsy is 100% donor with no morphologic or flow cytometric evidence of leukemia BCR abl is pending.  - Day +180 restaging BM bx- still in CR  100% donor;  2/16/22 BCR ABL major breakpoint undetectable.   - 1 year restaging 8/18/2022: Markedly hypocellular bone marrow [less than 10% cellular] with maturing trilineage hematopoiesis and no definite morphologic or immunophenotypic evidence for involvement by B lymphoblastic leukemia. BCRABL1 PCR not detected, bone marrow % donor. FISH NORMAL No evidence of BCR-ABL1 fusion  - Maintenance with TKI posttransplantation given " "his Ph positive ALL that have not been started previously presumed secondary to multiple active issues specifically infections and COVID.  Per Avila Tobar: will reconsider this patient will think about whether this is something he would like to consider he was previously on Dasatinib, we would start on a low dose and increase as tolerated.  This is on hold now pending active GVHD therapy, we discussed on this visit 10/18 in agreement with holding off.\"      Symptoms,  Nik was seen for a return virtual visit today.  His wife, Lamar is present for the visit.  Overall, he has had several setbacks since his last visit.  He was discovered to have osteomyelitis of his lower lumbar upper sacral vertebra months ago, and was hospitalized and on IV antibiotics for about 8 weeks.  He had significant pain over the course of this time, and has been following with a pain medicine team.  His pain regimen includes oxycodone, and OxyContin was also brought on.  His pain can last for a long time, and has improved with a good pain medication regimen but not completely dissipated.  Post the first course of antibiotics, his infection eventually returned through MRI that was done, and the second time involved S2.  He was hospitalized a few more times because of pain, and now he continues on a fentanyl patch and has oxycodone for breakthrough pain.  He states that his back pain is at a 3-4 level in terms of intensity compared to where it was at a 7-8.  He continues on IV vancomycin in addition to other antibiotics on his current regimen.  With his setbacks and antibiotic treatment, he has not felt his best in terms of functionality and mobility.  He sleeps most of the day, and has difficulty with generalized weakness and deconditioning.  In addition his appetite has been poor throughout the day.  His wife Lamar has been trying to get at least 1 protein shake in him every day, and aims for good nutrition and his meals.  He has small meals " throughout the day.  The last day of his current antibiotic regimen is October 9, then the plan is for imaging to reassess resolution of infection.  He cannot move without the assist of family members or others.  He fell down on his driveway attempting to ambulate on 9/15/2023 and suffered a few skin tears which have not healed, so he is being followed in clinic for wound care.  He has home infusions.        Therapies/HEP,  Difficulty participating in a home exercise regimen due to significant deconditioning and medical setbacks.      Functionally,   Significant difficulties with mobility and ADLs in the setting of current medical setbacks.      Social history is unchanged.      Medications:  Current Outpatient Medications   Medication Sig Dispense Refill     acetaminophen (TYLENOL) 500 MG tablet        acyclovir (ZOVIRAX) 800 MG tablet Take 1 tablet (800 mg) by mouth 2 times daily 60 tablet 4     Belumosudil Mesylate (REZUROCK) 200 MG TABS Take 1 tablet by mouth daily 30 tablet 3     Carboxymethylcell-Glycerin PF (REFRESH RELIEVA PF) 0.5-1 % SOLN Apply 1 drop to eye 4 times daily Preservative free. Either PF multidose bottle or PF vials 30 each 11     dexamethasone alcohol-free (DECADRON) 0.1 MG/ML solution Swish and spit 20 mLs (2 mg) in mouth 4 times daily (Patient taking differently: Swish and spit 2 mg in mouth 4 times daily Only using twice daily) 1000 mL 3     ertapenem (INVANZ) 1 GM vial Inject 1 g into the vein every 24 hours       escitalopram (LEXAPRO) 10 MG tablet Take 1 tablet (10 mg) by mouth daily 30 tablet 3     fentaNYL (DURAGESIC) 25 mcg/hr 72 hr patch Place 1 patch onto the skin every 72 hours remove old patch. 10 patch 0     fentaNYL (DURAGESIC) 50 mcg/hr 72 hr patch Place 1 patch onto the skin every 72 hours remove old patch. 5 patch 0     fluorometholone (FML LIQUIFILM) 0.1 % ophthalmic suspension Place 1 drop into both eyes daily 5 mL 3     HYDROcodone-acetaminophen (NORCO) 5-325 MG tablet  Take 1-2 tablets by mouth 3 times daily as needed for severe pain 84 tablet 0     hydrOXYzine (ATARAX) 10 MG tablet Take 10 mg at bedtime PRN for pain and sleep       levothyroxine (SYNTHROID/LEVOTHROID) 137 MCG tablet Take 1 tablet (137 mcg) by mouth daily 90 tablet 4     magnesium oxide (MAG-OX) 400 MG tablet Take 1 tablet (400 mg) by mouth 2 times daily 120 tablet 1     methocarbamol (ROBAXIN) 750 MG tablet Take 750 mg by mouth       naloxone (NARCAN) 4 MG/0.1ML nasal spray Spray 1 spray (4 mg) into one nostril alternating nostrils as needed for opioid reversal every 2-3 minutes until assistance arrives 0.2 mL 0     nicotine polacrilex (NICORETTE) 4 MG gum Take 4 mg by mouth as needed for smoking cessation        Omega-3 Fish Oil 500 MG capsule Take 2 capsules (1,000 mg) by mouth daily 120 capsule 3     omeprazole (PRILOSEC) 40 MG DR capsule Take 1 capsule (40 mg) by mouth daily 30 capsule 3     oxyCODONE (ROXICODONE) 5 MG tablet Take 1-2 tablets (5-10 mg) by mouth 2 times daily as needed for pain (max 4 tabs/day) Fill 8/23/2023 start 8/24/2023. 120 tablet 0     polyethylene glycol (MIRALAX) 17 g packet Take 1 packet by mouth       posaconazole (NOXAFIL) 100 MG EC tablet Take 3 tablets (300 mg) by mouth every morning 90 tablet 3     predniSONE (DELTASONE) 1 MG tablet Take 4 tablets (4 mg) by mouth daily Take on even days 120 tablet 0     predniSONE (DELTASONE) 5 MG tablet Take 2 tablets (10 mg) by mouth every other day Take on odd days 60 tablet 3     rosuvastatin (CRESTOR) 5 MG tablet Take 1 tablet (5 mg) by mouth daily 30 tablet 3     sennosides (SENOKOT) 8.6 MG tablet Take 1 tablet by mouth 3 times daily as needed for constipation 90 tablet 0     sodium chloride 0.9 % neb solution 3 mLs by Other route 2 times daily 300 mL 11     UNABLE TO FIND Take 1,200 mg by mouth daily MEDICATION NAME: Flaxseed Oil       Vancomycin HCl-Dextrose (VANCOMYCIN HCL IN DEXTROSE) 1.25-5 GM/250ML-% SOLN Inject 1,250 mg into the  "vein every 12 hours       carboxymethylcellulose PF (CARBOXYMETHYLCELLULOSE SODIUM) 0.5 % ophthalmic solution Place 1 drop into both eyes 4 times daily (Patient not taking: Reported on 9/26/2023) 70 each 11     diclofenac (VOLTAREN) 1 % topical gel Apply 4 g topically 4 times daily (Patient not taking: Reported on 9/13/2023) 350 g 1     erythromycin (ROMYCIN) 5 MG/GM ophthalmic ointment Place 0.5 inches into both eyes At Bedtime (Patient not taking: Reported on 8/15/2023) 3.5 g 4     ferrous sulfate (FE TABS) 325 (65 Fe) MG EC tablet Take 325 mg by mouth       LORazepam (ATIVAN) 1 MG tablet Take 1 tablet (1 mg) by mouth nightly as needed for anxiety or sleep (Patient not taking: Reported on 9/13/2023) 30 tablet 3     ondansetron (ZOFRAN ODT) 8 MG ODT tab        oxyCODONE-acetaminophen (PERCOCET) 5-325 MG tablet Take 1 tablet by mouth daily       zinc 50 MG TABS Take 100 mg by mouth daily Take 100 mg daily for 6 weeks (Patient not taking: Reported on 8/7/2023) 82 tablet 0              Physical Exam:   Ht 1.854 m (6' 1\")   Wt 110.7 kg (244 lb)   BMI 32.19 kg/m    Gen: NAD, pleasant and cooperative   HEENT: Atraumatic, normocephalic, extraocular movements appear intact  Pulm: non-labored breathing in room air  Neuro/MSK:   Orientation: Oriented to person, time, place and situation, Exhibits good insight into his condition and ongoing treatment  Motor: Observed moving upper extremities actively against gravity    Labs/Imaging:  Lab Results   Component Value Date    WBC 9.8 08/15/2023    HGB 11.8 (L) 08/15/2023    HCT 34.3 (L) 08/15/2023    MCV 99 08/15/2023     08/15/2023     Lab Results   Component Value Date     (L) 08/15/2023    POTASSIUM 4.7 08/15/2023    CHLORIDE 99 08/15/2023    CO2 28 08/15/2023     (H) 08/15/2023     Lab Results   Component Value Date    GFRESTIMATED 68 08/15/2023    GFRESTBLACK >90 07/08/2021     Lab Results   Component Value Date    AST 26 08/15/2023    ALT 17 08/15/2023 "    ALKPHOS 117 08/15/2023    BILITOTAL 0.6 08/15/2023     Lab Results   Component Value Date    INR 0.90 12/01/2022     Lab Results   Component Value Date    BUN 32.5 (H) 08/15/2023    CR 1.18 (H) 08/15/2023              Assessment/Plan   Nik Nava presents to clinic today for follow up reg his rehab needs.   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 Was last seen in clinic on 8/28/23.  Multiple rehabilitation considerations were discussed with Leeann at today's visit.  He has had a significant decline in mobility and ADLs since his last visit as a result of his spinal infection and multiple hospitalizations causing setbacks.  He continues with home infusions through [facility] care.  We discussed home physical therapy as what we would recommend to help with his significant deconditioning, gait and falls.  In addition it is recommended for him to use an assistive device, quad cane, for all transfers and ambulation to help with safety and fall prevention.  We will plan a return virtual visit in 3 months.  Nik is in agreement with this plan.      Therapy/equipment/braces:  Order for home physical therapy placed, will fax over to [facility].  Order for quad cane placed for safety with transfers and ambulation/fall prevention.  Continue protein supplementation, aiming for an extra 30 to 60 g of protein daily.  Follow up: 3-month virtual visit.      Carmen Mcdonough MD  Physical Medicine & Rehabilitation      50 minutes spent on the date of the encounter doing chart review, history and exam, documentation and further activities as noted above.               Again, thank you for allowing me to participate in the care of your patient.        Sincerely,        Carmen Mcdonough MD

## 2023-09-26 NOTE — NURSING NOTE
Is the patient currently in the state of [place]? YES    Visit mode:VIDEO    If the visit is dropped, the patient can be reconnected by: TELEPHONE VISIT: Phone number: [phone]    Will anyone else be joining the visit? Yes, spouse Lamar is there with him.  (If patient encounters technical issues they should call [phone])    How would you like to obtain your AVS? MyChart    Are changes needed to the allergy or medication list? No    Reason for visit: RECHECK (Video visit )    Mercedes CASSIDY

## 2023-09-26 NOTE — LETTER
"    9/26/2023         RE: Nik Nava  [id number] [facility] [id number]        Dear Colleague,    Thank you for referring your patient, Nik Nava, to the [facility]. Please see a copy of my visit note below.    Virtual Visit Details    Type of service:  Video Visit     Originating Location (pt. Location): Home    Distant Location (provider location):  On-site  Platform used for Video Visit: [facility]   PM&R clinic note        Interval history:     Nik Nava presents to clinic today for follow up reg his rehab needs.   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   Was last seen in clinic on 8/28/23.  Recommendations included:  Therapy/equipment/braces:  Continue outpatient physical therapy for low back pain.  Continue home exercise regimen as tolerated.  Medications:  Continue current pain medication regimen.  Referral / follow up with other providers:  Follow-up with [facility] orthopedics as planned for further evaluation and management of back injury.  Follow up: 6-month virtual visit.    Oncology History:  Per Dr. Chris Cote BMT note on 12/2022  \"1.  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 S/p allo sib PBSCT. (ABO matched)  HCT-CI score: 3 (prior solid tumor)  Day +100 bone marrow biopsy is 100% donor with no morphologic or flow cytometric evidence of leukemia BCR abl is pending.  - Day +180 restaging BM bx- still in CR  100% donor;  2/16/22 BCR ABL major breakpoint undetectable.   - 1 year restaging 8/18/2022: Markedly hypocellular bone marrow [less than 10% cellular] with maturing trilineage hematopoiesis and no definite morphologic or immunophenotypic evidence for involvement by B lymphoblastic leukemia. BCRABL1 PCR not detected, bone marrow % donor. FISH NORMAL No evidence of BCR-ABL1 fusion  - Maintenance with TKI posttransplantation given " "his Ph positive ALL that have not been started previously presumed secondary to multiple active issues specifically infections and COVID.  Per Avila Tobar: will reconsider this patient will think about whether this is something he would like to consider he was previously on Dasatinib, we would start on a low dose and increase as tolerated.  This is on hold now pending active GVHD therapy, we discussed on this visit 10/18 in agreement with holding off.\"      Symptoms,  Nik was seen for a return virtual visit today.  His wife, Lamar is present for the visit.  Overall, he has had several setbacks since his last visit.  He was discovered to have osteomyelitis of his lower lumbar upper sacral vertebra months ago, and was hospitalized and on IV antibiotics for about 8 weeks.  He had significant pain over the course of this time, and has been following with a pain medicine team.  His pain regimen includes oxycodone, and OxyContin was also brought on.  His pain can last for a long time, and has improved with a good pain medication regimen but not completely dissipated.  Post the first course of antibiotics, his infection eventually returned through MRI that was done, and the second time involved S2.  He was hospitalized a few more times because of pain, and now he continues on a fentanyl patch and has oxycodone for breakthrough pain.  He states that his back pain is at a 3-4 level in terms of intensity compared to where it was at a 7-8.  He continues on IV vancomycin in addition to other antibiotics on his current regimen.  With his setbacks and antibiotic treatment, he has not felt his best in terms of functionality and mobility.  He sleeps most of the day, and has difficulty with generalized weakness and deconditioning.  In addition his appetite has been poor throughout the day.  His wife Lamar has been trying to get at least 1 protein shake in him every day, and aims for good nutrition and his meals.  He has small meals " throughout the day.  The last day of his current antibiotic regimen is October 9, then the plan is for imaging to reassess resolution of infection.  He cannot move without the assist of family members or others.  He fell down on his driveway attempting to ambulate on 9/15/2023 and suffered a few skin tears which have not healed, so he is being followed in clinic for wound care.  He has home infusions.        Therapies/HEP,  Difficulty participating in a home exercise regimen due to significant deconditioning and medical setbacks.      Functionally,   Significant difficulties with mobility and ADLs in the setting of current medical setbacks.      Social history is unchanged.      Medications:  Current Outpatient Medications   Medication Sig Dispense Refill     acetaminophen (TYLENOL) 500 MG tablet        acyclovir (ZOVIRAX) 800 MG tablet Take 1 tablet (800 mg) by mouth 2 times daily 60 tablet 4     Belumosudil Mesylate (REZUROCK) 200 MG TABS Take 1 tablet by mouth daily 30 tablet 3     Carboxymethylcell-Glycerin PF (REFRESH RELIEVA PF) 0.5-1 % SOLN Apply 1 drop to eye 4 times daily Preservative free. Either PF multidose bottle or PF vials 30 each 11     dexamethasone alcohol-free (DECADRON) 0.1 MG/ML solution Swish and spit 20 mLs (2 mg) in mouth 4 times daily (Patient taking differently: Swish and spit 2 mg in mouth 4 times daily Only using twice daily) 1000 mL 3     ertapenem (INVANZ) 1 GM vial Inject 1 g into the vein every 24 hours       escitalopram (LEXAPRO) 10 MG tablet Take 1 tablet (10 mg) by mouth daily 30 tablet 3     fentaNYL (DURAGESIC) 25 mcg/hr 72 hr patch Place 1 patch onto the skin every 72 hours remove old patch. 10 patch 0     fentaNYL (DURAGESIC) 50 mcg/hr 72 hr patch Place 1 patch onto the skin every 72 hours remove old patch. 5 patch 0     fluorometholone (FML LIQUIFILM) 0.1 % ophthalmic suspension Place 1 drop into both eyes daily 5 mL 3     HYDROcodone-acetaminophen (NORCO) 5-325 MG tablet  Take 1-2 tablets by mouth 3 times daily as needed for severe pain 84 tablet 0     hydrOXYzine (ATARAX) 10 MG tablet Take 10 mg at bedtime PRN for pain and sleep       levothyroxine (SYNTHROID/LEVOTHROID) 137 MCG tablet Take 1 tablet (137 mcg) by mouth daily 90 tablet 4     magnesium oxide (MAG-OX) 400 MG tablet Take 1 tablet (400 mg) by mouth 2 times daily 120 tablet 1     methocarbamol (ROBAXIN) 750 MG tablet Take 750 mg by mouth       naloxone (NARCAN) 4 MG/0.1ML nasal spray Spray 1 spray (4 mg) into one nostril alternating nostrils as needed for opioid reversal every 2-3 minutes until assistance arrives 0.2 mL 0     nicotine polacrilex (NICORETTE) 4 MG gum Take 4 mg by mouth as needed for smoking cessation        Omega-3 Fish Oil 500 MG capsule Take 2 capsules (1,000 mg) by mouth daily 120 capsule 3     omeprazole (PRILOSEC) 40 MG DR capsule Take 1 capsule (40 mg) by mouth daily 30 capsule 3     oxyCODONE (ROXICODONE) 5 MG tablet Take 1-2 tablets (5-10 mg) by mouth 2 times daily as needed for pain (max 4 tabs/day) Fill 8/23/2023 start 8/24/2023. 120 tablet 0     polyethylene glycol (MIRALAX) 17 g packet Take 1 packet by mouth       posaconazole (NOXAFIL) 100 MG EC tablet Take 3 tablets (300 mg) by mouth every morning 90 tablet 3     predniSONE (DELTASONE) 1 MG tablet Take 4 tablets (4 mg) by mouth daily Take on even days 120 tablet 0     predniSONE (DELTASONE) 5 MG tablet Take 2 tablets (10 mg) by mouth every other day Take on odd days 60 tablet 3     rosuvastatin (CRESTOR) 5 MG tablet Take 1 tablet (5 mg) by mouth daily 30 tablet 3     sennosides (SENOKOT) 8.6 MG tablet Take 1 tablet by mouth 3 times daily as needed for constipation 90 tablet 0     sodium chloride 0.9 % neb solution 3 mLs by Other route 2 times daily 300 mL 11     UNABLE TO FIND Take 1,200 mg by mouth daily MEDICATION NAME: Flaxseed Oil       Vancomycin HCl-Dextrose (VANCOMYCIN HCL IN DEXTROSE) 1.25-5 GM/250ML-% SOLN Inject 1,250 mg into the  "vein every 12 hours       carboxymethylcellulose PF (CARBOXYMETHYLCELLULOSE SODIUM) 0.5 % ophthalmic solution Place 1 drop into both eyes 4 times daily (Patient not taking: Reported on 9/26/2023) 70 each 11     diclofenac (VOLTAREN) 1 % topical gel Apply 4 g topically 4 times daily (Patient not taking: Reported on 9/13/2023) 350 g 1     erythromycin (ROMYCIN) 5 MG/GM ophthalmic ointment Place 0.5 inches into both eyes At Bedtime (Patient not taking: Reported on 8/15/2023) 3.5 g 4     ferrous sulfate (FE TABS) 325 (65 Fe) MG EC tablet Take 325 mg by mouth       LORazepam (ATIVAN) 1 MG tablet Take 1 tablet (1 mg) by mouth nightly as needed for anxiety or sleep (Patient not taking: Reported on 9/13/2023) 30 tablet 3     ondansetron (ZOFRAN ODT) 8 MG ODT tab        oxyCODONE-acetaminophen (PERCOCET) 5-325 MG tablet Take 1 tablet by mouth daily       zinc 50 MG TABS Take 100 mg by mouth daily Take 100 mg daily for 6 weeks (Patient not taking: Reported on 8/7/2023) 82 tablet 0              Physical Exam:   Ht 1.854 m (6' 1\")   Wt 110.7 kg (244 lb)   BMI 32.19 kg/m    Gen: NAD, pleasant and cooperative   HEENT: Atraumatic, normocephalic, extraocular movements appear intact  Pulm: non-labored breathing in room air  Neuro/MSK:   Orientation: Oriented to person, time, place and situation, Exhibits good insight into his condition and ongoing treatment  Motor: Observed moving upper extremities actively against gravity    Labs/Imaging:  Lab Results   Component Value Date    WBC 9.8 08/15/2023    HGB 11.8 (L) 08/15/2023    HCT 34.3 (L) 08/15/2023    MCV 99 08/15/2023     08/15/2023     Lab Results   Component Value Date     (L) 08/15/2023    POTASSIUM 4.7 08/15/2023    CHLORIDE 99 08/15/2023    CO2 28 08/15/2023     (H) 08/15/2023     Lab Results   Component Value Date    GFRESTIMATED 68 08/15/2023    GFRESTBLACK >90 07/08/2021     Lab Results   Component Value Date    AST 26 08/15/2023    ALT 17 08/15/2023 "    ALKPHOS 117 08/15/2023    BILITOTAL 0.6 08/15/2023     Lab Results   Component Value Date    INR 0.90 12/01/2022     Lab Results   Component Value Date    BUN 32.5 (H) 08/15/2023    CR 1.18 (H) 08/15/2023              Assessment/Plan   Nik Nava presents to clinic today for follow up reg his rehab needs.   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 Was last seen in clinic on 8/28/23.  Multiple rehabilitation considerations were discussed with Leeann at today's visit.  He has had a significant decline in mobility and ADLs since his last visit as a result of his spinal infection and multiple hospitalizations causing setbacks.  He continues with home infusions through [facility] care.  We discussed home physical therapy as what we would recommend to help with his significant deconditioning, gait and falls.  In addition it is recommended for him to use an assistive device, quad cane, for all transfers and ambulation to help with safety and fall prevention.  We will plan a return virtual visit in 3 months.  Nik is in agreement with this plan.      Therapy/equipment/braces:  Order for home physical therapy placed, will fax over to [facility].  Order for quad cane placed for safety with transfers and ambulation/fall prevention.  Continue protein supplementation, aiming for an extra 30 to 60 g of protein daily.  Follow up: 3-month virtual visit.      Carmen Mcdonough MD  Physical Medicine & Rehabilitation      50 minutes spent on the date of the encounter doing chart review, history and exam, documentation and further activities as noted above.               Again, thank you for allowing me to participate in the care of your patient.        Sincerely,        Carmen Mcdonough MD

## 2023-09-27 ENCOUNTER — OFFICE VISIT (OUTPATIENT)
Dept: PALLIATIVE MEDICINE | Facility: CLINIC | Age: 63
End: 2023-09-27
Payer: COMMERCIAL

## 2023-09-27 ENCOUNTER — PATIENT OUTREACH (OUTPATIENT)
Dept: ONCOLOGY | Facility: CLINIC | Age: 63
End: 2023-09-27

## 2023-09-27 VITALS — SYSTOLIC BLOOD PRESSURE: 169 MMHG | DIASTOLIC BLOOD PRESSURE: 90 MMHG | HEART RATE: 71 BPM

## 2023-09-27 DIAGNOSIS — M54.10 RADICULAR PAIN OF RIGHT LOWER EXTREMITY: ICD-10-CM

## 2023-09-27 DIAGNOSIS — G06.1 ABSCESS IN EPIDURAL SPACE OF LUMBAR SPINE: ICD-10-CM

## 2023-09-27 DIAGNOSIS — M54.50 LUMBAR SPINE PAIN: Primary | ICD-10-CM

## 2023-09-27 PROCEDURE — 99215 OFFICE O/P EST HI 40 MIN: CPT

## 2023-09-27 RX ORDER — FENTANYL 37.5 UG/H
1 PATCH, EXTENDED RELEASE TRANSDERMAL
Qty: 10 PATCH | Refills: 0 | Status: SHIPPED | OUTPATIENT
Start: 2023-09-27 | End: 2023-11-14

## 2023-09-27 ASSESSMENT — PAIN SCALES - GENERAL: PAINLEVEL: MODERATE PAIN (4)

## 2023-09-27 NOTE — PROGRESS NOTES
[facility] Pain Management     Date of visit: 9/27/2023      Assessment:   Nik Nava is a 63 year old male with a past medical history significant for Grave's disease, GVHD s/p bone marrow txp, generalized muscle weakness, ALL in remission, CKD, renal cell carcinoma,  who presents with complaints of low back and RLE pain.      Low back/RLE - Onset of pain occurred early March 2023 after fall on ice, had low back and tailbone pain. He then went to ED on 3/30/23 with worsening pain and was found to have epidural space abscess, discitis, osteomyelitis. He is currently following with ED for extended antibiotic course. Of note, he has hx of ALL (in remission), s/p BMT. Lumbar MRIs on 3/30/23 and 4/28/23, in addition to acute red flag findings, noted multilevel degenerative changes. He continues to have significant low back and RLE pain that extends down lateral aspect of leg into ankle. Denies foot paraesthesias, no leg weakness, no other red flag symptoms. On exam, mild TTP in lumbosacral area, focal tenderness of left lower lumbar, no red flag findings. Etiology of pain is likely multifactorial, including osteomyelitis, epidural abscess, underlying degenerative changes of spine, with overlying myofascial component to an extent.     Visit Diagnoses:  1. Lumbar spine pain    2. Abscess in epidural space of lumbar spine    3. Radicular pain of right lower extremity        Plan:  Diagnosis reviewed, treatment option addressed, and risk/benifits discussed.  Self-care instructions given.  I am recommending a multidisciplinary treatment plan to help this patient better manage their pain.      Pain Physical Therapy:  NO   Continue activity as tolerated at home. May consider pain PT referral in future.      Pain Psychologist to address relaxation, behavioral change, coping style, and other factors important to improvement.  NO     Diagnostic Studies:  Reviewed lumbar MRI in chart - demonstrated multilevel  degenerative changes, discitis, epidural space abscess and osteomyelitis.     Medication Management:  Fentanyl - Reduce to 37.5 mcg/hr patch every 72 hours. Dosage was increased by [facility] inpatient pain team to 50 mcg/hr patch, and I spoke with [facility] pain provider prior to hospital discharge 9/6/23. At last visit, I advised that we will plan to continue on a short term basis until IV antibiotics are completed (as of right now, estimated end date 10/9/23), then recommend he begin tapering or consider more ideal longer-term medications.  He reports over sedation during daytime. Advised that it is likely due to high dose opioids and recommend dose reduction. He and wife are in agreement with this. Updated prescription for #10 patches sent in to pharmacy today.  Oxycodone - continue 5-10 mg twice daily as needed for breakthrough pain. Advised to contact clinic if twice daily dosing insufficient with decreased fentanyl dosage BEFORE increasing dosage. If they do contact clinic in between visits, okay to advise TID PRN dosing. No refills provided today, advised to contact clinic if refill needed in between visits.   Home supply of opioids brought into clinic today. Advised to dispose of hydrocodone, percocet, oxycontin, and Belbuca in [facility]. Advised to keep leftover fentanyl patches for now, as we may use for future titration/taper.   Naloxone - Prescription sent into pharmacy at prior visit. He reports having this on hand at home.   Controlled substance agreement and UDS completed on 5/12/23.   Apply diclofenac gel to painful joints at least 2 x day, ideally 3-4 x day. This medication works best when used consistently.   Hydroxyzine 10 mg at bedtime as needed for pain and sleep. No refills today, added to med list, has leftover home supply.   Advised at prior visit to contact clinic in between visits if struggling with pain control. We will need to consider neuropathic agents for better pain  control and lieu of escalating opioid doses in the future if needed. Will consider gabapentin      Potential procedures:   Deferred - He had lumbar injection through external neurosurgery clinic that was not helpful. No injection recommendations at this point, as he is on IV antibiotics for epidural abscess.      Other Orders/Referrals:   None      Follow up with PEE Fernandes CNP on 10/19/23 at 9:30 am via video visit      Review of Electronic Chart: Today I have also reviewed available medical information in the patient's medical record at [facility] ([facility]) and Care Everywhere (if available), including relevant provider notes, laboratory work, and imaging.     Akilah Shah, DOMINIC, PEE, AGNP-C  [facility] Pain Management     -------------------------------------------------    Subjective:    Chief complaint:   Chief Complaint   Patient presents with    Pain       Interval history:  Nik Nava is a 63 year old male last seen on 9/14/23.  They are a patient of mine seen in follow up.     Recommendations/plan at the last visit included:  Pain Physical Therapy:  NO   Continue activity as tolerated at home. May consider pain PT referral in future.      Pain Psychologist to address relaxation, behavioral change, coping style, and other factors important to improvement.  NO     Diagnostic Studies:  Reviewed lumbar MRI in chart - demonstrated multilevel degenerative changes, discitis, epidural space abscess and osteomyelitis.     Medication Management:  Fentanyl increased by [facility] inpatient pain team to 50 mcg/hr patch. I spoke with [facility] pain provider prior to hospital discharge 9/6/23. Today, I advised that we will plan to continue on a short term basis until IV antibiotics are completed (as of right now, estimated end date 10/9/23), then recommend he begin tapering or consider more ideal longer-term medications.  Updated prescription for #5 patches sent in to pharmacy today.  Stop oxycodone.   He reports significant cognitive side effects that are unpleasant.  Start hydrocodone 5-325 mg, 1-2 tabs up to 3 times daily as needed, max 6 tabs per day.  Advised to let me know in between visits if he does not appreciate equivalent pain benefit to oxycodone, but he is not to resume oxycodone without speaking to clinic first.  He and wife verbalized understanding of this plan.  New hydrocodone prescription for #84 tablets sent in to pharmacy today.  Advised to bring all remaining oxycodone and hydrocodone to next visit.  Reports #1 fentanyl 25 mcg/h patch leftover at home, advised to keep at home for future weaning.  Naloxone - Prescription sent into pharmacy at prior visit. He reports having this on hand at home.   Controlled substance agreement and UDS completed on 5/12/23.   Apply diclofenac gel to painful joints at least 2 x day, ideally 3-4 x day. This medication works best when used consistently.   Hydroxyzine 10 mg at bedtime as needed for pain and sleep. No refills today, added to med list, has leftover home supply.   Advised at last visit to contact clinic in between visits if struggling with pain control. Will recommend starting gabapentin 300 mg and titrate to goal dose 600 mg three times daily. He was previously taking 300 mg TID and did not appreciate analgesic benefit.  I stand by this recommendation discussed at last visit, specifically that we will need to consider neuropathic agents for better pain control and lieu of escalating opioid doses.     Potential procedures:   Deferred - He had lumbar injection through external neurosurgery clinic that was not helpful. No injection recommendations at this point, as he is on IV antibiotics for epidural abscess.      Other Orders/Referrals:   None      Follow up with PEE Fernandes CNP on 9/2723 at 11:30 am for in person visit.      Since his last visit, Nik Nava reports:  -He had a fall on 9/15, sustained skin tears.   -He is still on IV  antibiotics.   -He is on fentanyl 50 mcg/hr patch, last use of oxycodone on Saturday.   -He tried hydrocodone for breakthrough pain, but he only used once.   -He has #82 hydrocodone tabs left, per wife.   -He has #61 tabs of oxycodone.   -He also has misc opioids they will be disposing of today in the pharmacy.   -He continues to take miralax and senna for OIC, reports this is working well.   -He reports oversedation and sleeping a lot lately, even when out at activities like his granddaughter's volleyball game.   -      Pain Information:   Pain rating: averages 4/10 on a 0-10 scale.      Interval history from last visit on 9/14/23:  -his pain is about the same as it was at last visit.   -He was in hospital again due to pain from epidural abscess infection.   -He continues to struggle with pain overnight.   -Per wife we took tylenol and muscle relaxant overnight and this helped.   -He uses hydroxyzine PRN as well for sleep.   -Fentanyl patch was increased to 50 mcg/hr with recent hospital visit.   -He continues to take oxycodone, current dosage 10 mg BID for breakthrough pain, total 20 mg/day.  -He states he does not like side effects with oxycodone, wondering about different opioid.   -He is open to trial hydrocodone  -He may be interested medical cannabis to help reduce opioid usage, should pain persist beyond infection tx.   -He is using miralax to help with constipation.  Pain Information:              Pain rating: averages 5/10 on a 0-10 scale.        Interval history from last visit on 8/31/23:  -his pain is worse than it was at last visit.   -He was inpatient at [facility], epidural infection recurrence.   -He had tapered off Belbuca in between visits at the recommendation of neurosurgery.   -He started gabapentin 300 mg TID, but he did not appreciate analgesic benefit.   -He was started on fentanyl patch in hospital, gabapentin discontinued due to lack of benefit.   -Current fentanyl dosage 25 mcg/hr patch,  increased and changed yesterday, so not at therapeutic level quite yet.   -He continues oxycodone for breal through pain.   -Surgery not recommended at this point.   -He took hydroxyzine for pain and sleep in the spring with prior injection.   -He is on IV antibiotics now at home.   -He had LESI with neurosurgery that was not helpful at all.   Pain Information:              Pain rating: averages 5/10 on a 0-10 scale.        Interval history from last visit on 7/10/23:  -He saw neurosurgeon since last visit, who advised him to taper off Belbuca and oxycodone.   -He has been working on reducing dosage on his own, down to Belbuca 150 mcg BID.   -He states he is having injection on Wednesday with neurosurgery.   -He is going to wait until after injection to decide how to proceed with surgery and possibly medications.   -His last dose of oxycodone was 4-5 days ago.   -He may be interested in resuming Belbuca, pending outcome from injection.   -He is not sure if he wants to pursue surgery.   -He reports surgeon recommended possibly gabapentin to help with nerve pain.   -He is interested in possibly exploring this, pending outcome from injection.   Pain Information:              Pain rating: averages 3/10 on a 0-10 scale.        Interval history from last visit on 6/15/23:  -his pain is about the same as it was at last visit.   -He notes he has been more active recently.   -Belbuca was increased at last visit to 300 mcg, has not appreciated improvement in pain.   -He notes his increased activity is impacting pain levels.   -He uses oxycodone 5 mg 1-2 x day PRN for breakthrough pain.   -He does not like to use oxycodone due to side effects.   -He has upcoming appointment with neurosurgery, states they will be doing another MRI.   -He states   -He has some mouth irritation from buccal film.   -No side effects   Pain Information:              Pain rating: averages 5/10 on a 0-10 scale.        Interval history from last visit  "on 5/24/23:  -his pain is about the same as it was at last visit.   -He was started on Belbuca 150 mcg BID at last visit.   -He was previously taking Oxycontin BID that caused marked sedation.   -He states things are \"okay\" since starting Belbuca, but he is having some oral irritation.   -He has hx of GVHD in his mouth, no open sores but he is concerned about this possibility.  -He has not appreciated significant benefit for pain with Belbuca 150 mcg BID.   -He has noticed improvement in mental clarity.  -No other side effects aside from mild oral irritation.   -He was given oxycodone 5 mg #20 tabs at last visit for breakthrough pain, reports he has only used 1 tablet of this prescription.   -He uses diclofenac gel on low back, right hip and RLE.   -He requests refill for diclofenac gel.   -He finishes long term abx tomorrow.   -He will be following up with Infectious Disease and neurosurgery soon.   -His UDS was positive for tramadol, which he was given prior. Denies tramadol use since last visit.   -We discussed [facility] drug drop boxes for old medications.   -His wife Lamar is present for the visit today.   Pain Information:              Pain rating: averages 2-3/10 on a 0-10 scale.           HPI from initial visit with me on 5/12/23:  Nik Nava is a 63 year old male presents with a chief complaint of low back and right hip pain, .      The pain has been present for 2+ months .    The pain is Extreme Pain (9) in severity.    The pain is described as throbbing in low back and thigh, \"zingers\" down the leg.   The pain is alleviated by meds (oxycontin), rest/lying down.    It is exacerbated by prolonged sitting and walking, driving.    Modalities that have been utilized in the past which were helpful include oxycodone while inpatient.    Things that were not helpful, but tried ,include tramadol while inpatient, .    The patient has never tried pain PT, injections (cannot do right now due to osteomyelitis).  The " patient otherwise denies bowel or bladder incontinence, parasthesias, weakness, saddle anesthesia, unintentional weight loss, or fever/chills/sweats.   Nik Nava has not been seen at a pain clinic in the past.  He had inpatient consult while hospitalized 3/30 ([facility])  -He had fall on ice 3/5/23, tailbone pain initially, then that has improved but continues to have low back and leg pain.   -He went to ED and admitted to hospital ([facility]) for osetomyelitis in the low back.   -He had pain team consult while inpatient, was started on oxycodone, tramadol, and hydroxyzine.   -Denies paraesthesias in feet.   -Denies weakness.   -He follows with Inf Dis, still on antibiotics.   -He finds MS contin helpful but he has significant sedation.   -His wife notes improvement in functioning after starting Oxycontin.   -He has home PT right now, chiropractor in the past.   -Oxycodone 5 mg at bedtime PRN prior to fall/infection  -Daughter Luis Miguel is present for visit, wife Lamar on the phone.         Current Pain Treatments:    Medications:                             Tylenol 1000 mg                  Fentanyl 25 mcg/hr patch q72h              Oxycodone 5 mg BID PRN               Naloxone - sent into pharmacy at prior visit     2. Other therapies:               Inf Disease - currently on antibiotic therapy               Home PT     Current MME: 0-15     Review of [place] Prescription Monitoring Program (): No concern for abuse or misuse of controlled medications based on this report. Reviewed - appears appropriate.      Annual Controlled Substance Agreement/UDS due date: Completed on 5/12/23     Past pain treatments:  Surgery   Belbuca 150 mcg BID     Medications:  Current Outpatient Medications   Medication Sig Dispense Refill    acetaminophen (TYLENOL) 500 MG tablet       acyclovir (ZOVIRAX) 800 MG tablet Take 1 tablet (800 mg) by mouth 2 times daily 60 tablet 4    Belumosudil Mesylate (REZUROCK) 200 MG TABS Take 1 tablet by  mouth daily 30 tablet 3    Carboxymethylcell-Glycerin PF (REFRESH RELIEVA PF) 0.5-1 % SOLN Apply 1 drop to eye 4 times daily Preservative free. Either PF multidose bottle or PF vials 30 each 11    carboxymethylcellulose PF (CARBOXYMETHYLCELLULOSE SODIUM) 0.5 % ophthalmic solution Place 1 drop into both eyes 4 times daily 70 each 11    dexamethasone alcohol-free (DECADRON) 0.1 MG/ML solution Swish and spit 20 mLs (2 mg) in mouth 4 times daily (Patient taking differently: Swish and spit 2 mg in mouth 4 times daily Only using twice daily) 1000 mL 3    ertapenem (INVANZ) 1 GM vial Inject 1 g into the vein every 24 hours      escitalopram (LEXAPRO) 10 MG tablet Take 1 tablet (10 mg) by mouth daily 30 tablet 3    fentaNYL 37.5 MCG/HR 72 hr patch Place 1 patch onto the skin every 72 hours 10 patch 0    fluorometholone (FML LIQUIFILM) 0.1 % ophthalmic suspension Place 1 drop into both eyes daily 5 mL 3    levothyroxine (SYNTHROID/LEVOTHROID) 137 MCG tablet Take 1 tablet (137 mcg) by mouth daily 90 tablet 4    LORazepam (ATIVAN) 1 MG tablet Take 1 tablet (1 mg) by mouth nightly as needed for anxiety or sleep 30 tablet 3    magnesium oxide (MAG-OX) 400 MG tablet Take 1 tablet (400 mg) by mouth 2 times daily 120 tablet 1    methocarbamol (ROBAXIN) 750 MG tablet Take 750 mg by mouth      naloxone (NARCAN) 4 MG/0.1ML nasal spray Spray 1 spray (4 mg) into one nostril alternating nostrils as needed for opioid reversal every 2-3 minutes until assistance arrives 0.2 mL 0    nicotine polacrilex (NICORETTE) 4 MG gum Take 4 mg by mouth as needed for smoking cessation       Omega-3 Fish Oil 500 MG capsule Take 2 capsules (1,000 mg) by mouth daily 120 capsule 3    omeprazole (PRILOSEC) 40 MG DR capsule Take 1 capsule (40 mg) by mouth daily 30 capsule 3    oxyCODONE (ROXICODONE) 5 MG tablet Take 1-2 tablets (5-10 mg) by mouth 2 times daily as needed for pain (max 4 tabs/day) Fill 8/23/2023 start 8/24/2023. 120 tablet 0    polyethylene  glycol (MIRALAX) 17 g packet Take 1 packet by mouth      posaconazole (NOXAFIL) 100 MG EC tablet Take 3 tablets (300 mg) by mouth every morning 90 tablet 3    predniSONE (DELTASONE) 1 MG tablet Take 4 tablets (4 mg) by mouth daily Take on even days 120 tablet 0    predniSONE (DELTASONE) 5 MG tablet Take 2 tablets (10 mg) by mouth every other day Take on odd days 60 tablet 3    rosuvastatin (CRESTOR) 5 MG tablet Take 1 tablet (5 mg) by mouth daily 30 tablet 3    sennosides (SENOKOT) 8.6 MG tablet Take 1 tablet by mouth 3 times daily as needed for constipation 90 tablet 0    sodium chloride 0.9 % neb solution 3 mLs by Other route 2 times daily 300 mL 11    UNABLE TO FIND Take 1,200 mg by mouth daily MEDICATION NAME: Flaxseed Oil      Vancomycin HCl-Dextrose (VANCOMYCIN HCL IN DEXTROSE) 1.25-5 GM/250ML-% SOLN Inject 1,250 mg into the vein every 12 hours      diclofenac (VOLTAREN) 1 % topical gel Apply 4 g topically 4 times daily (Patient not taking: Reported on 9/13/2023) 350 g 1    erythromycin (ROMYCIN) 5 MG/GM ophthalmic ointment Place 0.5 inches into both eyes At Bedtime (Patient not taking: Reported on 8/15/2023) 3.5 g 4    fentaNYL (DURAGESIC) 25 mcg/hr 72 hr patch Place 1 patch onto the skin every 72 hours remove old patch. (Patient not taking: Reported on 9/27/2023) 10 patch 0    ferrous sulfate (FE TABS) 325 (65 Fe) MG EC tablet Take 325 mg by mouth      HYDROcodone-acetaminophen (NORCO) 5-325 MG tablet Take 1-2 tablets by mouth 3 times daily as needed for severe pain (Patient not taking: Reported on 9/27/2023) 84 tablet 0    hydrOXYzine (ATARAX) 10 MG tablet Take 10 mg at bedtime PRN for pain and sleep (Patient not taking: Reported on 9/27/2023)      ondansetron (ZOFRAN ODT) 8 MG ODT tab       oxyCODONE-acetaminophen (PERCOCET) 5-325 MG tablet Take 1 tablet by mouth daily      zinc 50 MG TABS Take 100 mg by mouth daily Take 100 mg daily for 6 weeks (Patient not taking: Reported on 8/7/2023) 82 tablet 0        Medical History: any changes in medical history since they were last seen? Yes - fall 9/15/23, sustained large skin tear.       Objective:    Physical Exam:  Blood pressure (!) 169/90, pulse 71.  Constitutional: Well developed, well nourished, appears stated age.  Gait: Grossly intact   HEENT: Head atraumatic, normocephalic. Eyes without conjunctival injection or jaundice. Oropharynx clear. Neck supple. No obvious neck masses.  Skin: No rash, lesions, or petechiae of exposed skin.   Psychiatric/mental status: Alert, without lethargy or stupor. Speech fluent. Appropriate affect. Mood normal. Able to follow commands without difficulty.     Diagnostic Tests/Imaging/Labs:  None     BILLING TIME DOCUMENTATION:   The total TIME spent on this patient on the date of the encounter/appointment was 45 minutes.      TOTAL TIME includes:   Time spent preparing to see the patient (reviewing records and tests)   Time spent face to face (or over the phone) with the patient   Time spent ordering tests, medications, procedures and referrals   Time spent Referring and communicating with other healthcare professionals   Time spent documenting clinical information in Epic

## 2023-09-27 NOTE — PATIENT INSTRUCTIONS
Pain Physical Therapy:  NO   Continue activity as tolerated at home. May consider pain PT referral in future.      Pain Psychologist to address relaxation, behavioral change, coping style, and other factors important to improvement.  NO     Diagnostic Studies:  Reviewed lumbar MRI in chart - demonstrated multilevel degenerative changes, discitis, epidural space abscess and osteomyelitis.     Medication Management:  Fentanyl - Reduce to 37.5 mcg/hr patch every 72 hours. Dosage was increased by [facility] inpatient pain team to 50 mcg/hr patch, and I spoke with [facility] pain provider prior to hospital discharge 9/6/23. At last visit, I advised that we will plan to continue on a short term basis until IV antibiotics are completed (as of right now, estimated end date 10/9/23), then recommend he begin tapering or consider more ideal longer-term medications.  He reports over sedation during daytime. Advised that it is likely due to high dose opioids and recommend dose reduction. He and wife are in agreement with this. Updated prescription for #10 patches sent in to pharmacy today.  Oxycodone - continue 5-10 mg twice daily as needed for breakthrough pain. Advised to contact clinic if twice daily dosing insufficient with decreased fentanyl dosage BEFORE increasing dosage. If they do contact clinic in between visits, okay to advise TID PRN dosing. No refills provided today, advised to contact clinic if refill needed in between visits.   Home supply of opioids brought into clinic today. Advised to dispose of hydrocodone, percocet, oxycontin, and Belbuca in [facility]. Advised to keep leftover fentanyl patches for now, as we may use for future titration/taper.   Naloxone - Prescription sent into pharmacy at prior visit. He reports having this on hand at home.   Controlled substance agreement and UDS completed on 5/12/23.   Apply diclofenac gel to painful joints at least 2 x day, ideally 3-4 x day. This medication  works best when used consistently.   Hydroxyzine 10 mg at bedtime as needed for pain and sleep. No refills today, added to med list, has leftover home supply.   Advised at prior visit to contact clinic in between visits if struggling with pain control. We will need to consider neuropathic agents for better pain control and lieu of escalating opioid doses in the future if needed. Will consider gabapentin      Potential procedures:   Deferred - He had lumbar injection through external neurosurgery clinic that was not helpful. No injection recommendations at this point, as he is on IV antibiotics for epidural abscess.      Other Orders/Referrals:   None      Follow up with PEE Fernandes CNP on 10/19/23 at 9:30 am via video visit    ----------------------------------------------------------------  Clinic Number:  [phone]   Call with any questions about your care and for scheduling assistance.   Calls are returned Monday through Friday between 8 AM and 4:30 PM. We usually get back to you within 2 business days depending on the issue/request.    If we are prescribing your medications:  For opioid medication refills, call the clinic or send a [employer] message 7 days in advance.  Please include:  Name of requested medication  Name of the pharmacy.  For non-opioid medications, call your pharmacy directly to request a refill. Please allow 3-4 days to be processed.   Per [place] State Law:  All controlled substance prescriptions must be filled within 30 days of being written.    For those controlled substances allowing refills, pickup must occur within 30 days of last fill.      We believe regular attendance is key to your success in our program!    Any time you are unable to keep your appointment we ask that you call us at least 24 hours in advance to cancel.This will allow us to offer the appointment time to another patient.   Multiple missed appointments may lead to dismissal from the clinic.

## 2023-09-27 NOTE — NURSING NOTE
8/31/2023     8:07 AM 9/13/2023     3:37 PM 9/27/2023    11:31 AM   PEG Score   PEG Total Score 7 5.33 5.33

## 2023-09-27 NOTE — PROGRESS NOTES
[facility]: Cancer Care                                                                                          Faxed Home care referral to [facility] [phone]    Signature:  Emiliana Lan RN

## 2023-10-02 ENCOUNTER — TELEPHONE (OUTPATIENT)
Dept: PALLIATIVE MEDICINE | Facility: CLINIC | Age: 63
End: 2023-10-02
Payer: MEDICARE

## 2023-10-02 DIAGNOSIS — C91.01 ACUTE LYMPHOBLASTIC LEUKEMIA (ALL) IN REMISSION (H): ICD-10-CM

## 2023-10-02 RX ORDER — PREDNISONE 1 MG/1
4 TABLET ORAL DAILY
Qty: 120 TABLET | Refills: 0 | Status: SHIPPED | OUTPATIENT
Start: 2023-10-02 | End: 2023-12-11

## 2023-10-02 NOTE — TELEPHONE ENCOUNTER
Pt's wife (Lamar, no consent to communicate on file) calling to state that pt has an MRI 10/4/23 at [facility].     They were instructed that the fentanyl patch will need to be removed for MRI, wife is asking if pt can put used patch back on complete to fentanyl dose or a new one will need to be applied?     Aileen Cullen RN on 10/2/2023 at 11:39 AM

## 2023-10-09 NOTE — PROGRESS NOTES
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    HPI:    Nik returns to clinic for follow up. He is disappointed understandably with the recurrence of discitis/OM, and second long course of treatment, was seen by ID and planned to continue treatment and referred to [facility], pain however is better controlled on fentanyl but causing significant fatigue and sleepiness. He is followed for adjusting his pain regimen with PCP locally.  In terms of chronic GVHD symptoms are better controlled overall and stable, using eyedrops 3-4 times a day only down from more than 10 times per day earlier in the summer, not using scleral lenses regularly but believes ocular lenses have made a significant difference in his quality of life and ocular symptoms.  No new oral sores or ulcers however he does have persistent mild lichenoid changes with a healed epithelialized ulcer on bilateral buccal mucosa.   No new respiratory symptoms.  No chest pain.  No nausea vomiting or diarrhea.  No bleeding and no headaches.    Review of Systems                                                                                                                                         10 point Review of systems was negative except as detailed above     PHYSICAL EXAM                                                                                                                                                   KPS: 60-70      Wt Readings from Last 4 Encounters:   09/26/23 110.7 kg (244 lb)   08/15/23 110.8 kg (244 lb 4.8 oz)   08/07/23 108.4 kg (239 lb)   08/07/23 108.4 kg (239 lb)      General: NAD.  HEENT: sclera anicteric, injected conjunctivae. Mild patchy lichenoid changes in oral mucosa with prominent area of healed in the right/left mid buccal mucosa -10/10 less prominent on the right today with overall improved appearence and more normal appearing  mucosal tissue, no ulcers seen.    Lungs:  CTA b/l  no wheezes  CV: RRR  no gallop  Abd; soft, NABS, protuberant  Ext/ Skin: Skin bruising on bilateral forearms, wrapped right forarm after recent fall, pictures reviewed, fainting of previous hyperpigmented areas of previously known cGVHD  LE 1+ bilateral edema in lower extremities, left>right noted today; stable    cGVHD therapy started on February 24 2022  - Baseline NIH scoring 2/24/2022 : skin 2 maculopapular rash/erythema with no sclerotic features, mouth score 1 mild symptoms with lichenoid changes less than 25%, eyes score 2 moderate symptoms without new visual impairments due to KCS requiring lubricant eyedrops more than 3 times a day, overall mild scale for her provider  - 3/25/2022 1 month NIH treatment assessment on PQRST: skin 2, mouth score 1 mild symptoms with NO lichenoid changes, eyes score 2 moderate symptoms w requiring lubricant eyedrops more than 3 times a day, liver score 1 ALT 3.7x ULN and nl bilirubin   - 3/31  Mouth clear; eyes minimal LFT still abn; no joint or new GI sx  - 4/28 Mouth clear, Left>R eye is mild sclera erythema, lids are pink and irritated appearing, LFT's slow improvement, no new joint, skin, or GI symptoms.   -5/11 mouth with mild erythema but no lichenoid changes, eyes using eyedrops 4-6 times a day score of 2, LFTs significantly improved from baseline slight elevation in alk phos and AST with normal bilirubin, skin with hyperpigmentation from old acute GVHD no active skin rashes, no GI symptoms no musculoskeletal complaints.  -5/26 Mouth with very slight lichenoid changes, erythema.  Eyes still using eyedrops 4-6x per day.  LFTs essentially normal with slight elevation in alk phos.  Skin with hyperpigmentation from old acute GVHD, no active rashes, no GI or MSK concerns.  Skin 0, mouth 0 but with lichenoid changes, eyes 2  -6/7/22 Mouth with no lichenoid changes, erythema.  Eyes still using eyedrops 4-6x per day.  LFTs  essentially normal with slight elevation in alk phos.  Skin with hyperpigmentation from old acute GVHD, no active rashes, no GI or MSK concerns.  Skin 0, mouth 0, eyes 2     -6 month PQRST assessment 9/6/2022: eyes 3, mouth 0 for symptoms, 25% lichenoid changes (1), liver/GI/skin 0    11/8/2022, 11/22/2022, 12/13/22 cGVHD NIG scoring eyes 3, no other organ involvement clinically  3/28/23 cGVHD NIG scoring eyes 3, no other organ involvement clinically  5/2/23 cGVHD NIG scoring eyes 3, oral 1, no other organ involvement clinically  6/13/23 cGVHD NIG scoring eyes 3, oral 1, no other organ involvement clinically  8/15/23 cGVHD NIG scoring eyes 3 (down to 3-4 times eye drop from >10 but still using scleral lenses), oral 1, no other organ involvement clinically  10/10/2023 cGVHD NIG scoring eyes 2-3 (down to 3-4 times eye drop from >10 but still using scleral lenses), oral 1, no other organ involvement clinically  Lab:    Lab Results   Component Value Date    WBC 9.8 08/15/2023    ANEU 3.5 10/26/2021    HGB 11.8 (L) 08/15/2023    HCT 34.3 (L) 08/15/2023     08/15/2023     (L) 08/15/2023    POTASSIUM 4.7 08/15/2023    CHLORIDE 99 08/15/2023    CO2 28 08/15/2023     (H) 08/15/2023    BUN 32.5 (H) 08/15/2023    CR 1.18 (H) 08/15/2023    MAG 2.0 08/15/2023    INR 0.90 12/01/2022    BILITOTAL 0.6 08/15/2023    AST 26 08/15/2023    ALT 17 08/15/2023    ALKPHOS 117 08/15/2023    PROTTOTAL 6.3 (L) 08/15/2023    ALBUMIN 4.0 08/15/2023     4/28/2023    MRI Lumbar spine  1.  The previously seen ventral epidural abscess has resolved with small amount of residual ventral epidural phlegmon.   2.  Marrow edema about the L5-S1 endplates has mildly worsened within new rim-enhancing subchondral lesion in the left S1 superior endplate. This could reflect progression of osteomyelitis.      MRI hip right  1.  No evidence of septic arthritis of the right hip joint.   2.  There is degeneration and likely tearing of the right  acetabular labrum anterosuperiorly, and probably low-grade chondromalacia of the right hip joint.   3.  Abnormality at L5-S1 compatible with known discitis-osteomyelitis.    ASSESSMENT AND PLAN   Nik Nava is a 63 year old male with Ph Pos ALL, day 790 s/p sib allo stem cell transplant    Day -6 (8/4): flu/cy  Day -5 through day -2 (8/5-8/8): flu  Day -1 (8/9): TBI  Day 0 (8/10): transplant     1.  Acute lymphoblastic leukemia, [place] chromosome positive in CR, MRD neg. S/p allo sib PBSCT. (ABO matched)  HCT-CI score: 3 (prior solid tumor)  Day +100 bone marrow biopsy is 100% donor with no morphologic or flow cytometric evidence of leukemia BCR abl is pending.  - Day +180 restaging BM bx- still in CR  100% donor;  2/16/22 BCR ABL major breakpoint undetectable.   - 1 year restaging 8/18/2022: Markedly hypocellular bone marrow [less than 10% cellular] with maturing trilineage hematopoiesis and no definite morphologic or immunophenotypic evidence for involvement by B lymphoblastic leukemia. BCRABL1 PCR not detected, bone marrow % donor. FISH NORMAL No evidence of BCR-ABL1 fusion  - Maintenance with TKI posttransplantation given his Ph positive ALL that have not been started previously presumed secondary to multiple active issues specifically infections and COVID.  Per Avila Tobar: will reconsider this patient will think about whether this is something he would like to consider he was previously on Dasatinib, we would start on a low dose and increase as tolerated.  This is on hold now pending active GVHD therapy, we discussed on this visit 10/18 in agreement with holding off.  - 2 year restaging 8/7/2023 BMB: - No morphologic or immunophenotypic evidence of recurrent/persistent B-lymphoblastic leukemia. Slightly hypocellular marrow (10-20% estimated cellularity, patchy and variable), with trilineage hematopoetiic maturation and less than 2% blasts. Peripheral blood showing slight normocytic normochromic  anemia and slight thrombocytopenia. BM % donor. FISH No evidence of BCR::ABL1 fusion /CG No recipient (male) cells  or cells with a t(9;22) or other clonal chromosomal abnormality were  detected among the 20 metaphases analyzed.      2.  HEME: CBC stable.   3/2023 iron low 28, iron saturation index 10%, transferrin 225, ferritin 1381 down trending (in retrospect that was the time of epidural abscess as well).  B12, folate normal.  High copper and zinc borderline low, 5/2023 started zinc.  Started iron replacement in 4/2023, recheck iron profile on follow up more consistent with AOCD, will discontinue iron. Note ferritin elevation is int he setting of discitis/OM.    3.  FEN/Renal: Creatinine 0.97, s/p nephrectormy, nephrology following     4.    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   10/10/2023 cGVHD NIG scoring eyes 2-3, oral 1, no other organ involvement clinically   #Skin GVHD- history of biopsy proven GVHD of the skin 8/30/2021; resolved.   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 WNL today   # Ocular GVHD: follows with ophthalmology. Maxitrol to lids, continue refreshing drops QID. Score 3, but down to 3-4 times eye drops from >10/day. Not needing Sl all the time.  Followed closely by Dr. Juarez with significant improvement with scleral lenses and eyedrops  # Oral GVHD: dex s/s <2-3 x.  Lichenoid changes have largely resolved with no open ulcers since 11/8/2022, except for improving lichenoid changes to the mid right/left buccal mucosa    Previous therapies  Tacrolimus-previously decided to stay on same dose, no checks of levels if clinically stable, and no need switch to sirolimus. (keep tac low as gvhd is quiet and viremia). 5/10 level 45 with starting of COVID therapy and D-D intractions (Paxlovid for COVID19, which has a drug interaction with tacrolimus-Ritonavir increases serum levels of tacrolimus).   Tacrolimus level  subtherapeutic, increase to 2.5 mg twice daily recently, 8/23/2022 instructed to change tacrolimus to 2 mg twice daily. 9/6 with mild NEGRA, hyperkalemia, hypomagnesemia, and reports some tremors and cramps, suspect tacrolimus to be high therefore empirically decreased to 1.5 twice daily, skip morning dose of tacrolimus and took 2 mg today after his afternoon labs. Decrease to 1mg BID on Saturday.  Sirolimus  9/21 despite fluids and a dose adjustment of tacrolimus patient seem to have persistence NORBERTO related NEGRA, therefore after discussion with the patient decision was made to transition to sirolimus that was started on 9/21, patient initially seem to tolerate this well with normalization of kidney function  10/11 presented with flares of ocular and oral GVHD, fluid retention and gain of 5-6 kg, lower extremity edema, abdominal distention, total protein to creatinine ratio elevated at 0.4, in addition to elevation in BUN and creatinine, HTN.  Picture most consistent with sirolimus related side effects.  Less likely that the patient will tolerate even a lower level of sirolimus.  Therefore the patient was instructed to stop sirolimus, last dose on 10/10. 10/14 level 3.5 appropriately trending down.     Corticosteroids  3/1-3/16: prednisone 100mg every day  3/17 75mg every day   3/31 75 alt with 50  4/6: 75 alt with 25mg every other day  4/12 pred tapered to 60 alternating with 25mg   4/15 In setting of viruses trying to reactivate,GVHD stable: Taper 60/15mg alternating.  4/20 decrease pred further to 50/10.    4/25 taper pred to 50/0 every other day as long as symptoms stable.   5/2 Pred decrease 40/0 alternating every other day.   5/13 decrease to 30/0  5/20 decrease to 20/0 then slowly by 5 mg weekly  6/7 decrease to 10/0  Went up to 15/0 with mild increased LFTs  8/23/2022 increased to 20/0, with persistence of oral ulcers and active ocular GVHD.  Discussed with the patient the importance of stopping chewing of  nicotine gums for prolonged hours as that would not help with the healing of the oral ulcers.  I discussed with the patient alternatives of nicotine patches that he will reconsider and let me know.  9/6 decreased to 15 alternating with 0  9/13 decreased to 10 alternating with 0  9/21 discontinued tacrolimus given persistent NEGRA and single kidney and start the patient on sirolimus without loading dose.  We will get a list of possible side effects and adverse events from the Pharm.D. see sirolimus section above.  10/11 GVHD flare. Sirolimus stopped. Resume prednisone 40 mg daily as of 10/12, no change with mildly worsening eye pain 10/14  Reduce pred to 35mg 10/25 and 25mg 11/1 11/8 20 mg   11/22 15 mg  12/13/22 10 mg (plan to taper to 5mg then slow taper 1 mg per month given long pred history)  2/23/23 lower from 5 mg to 4 mg for the next month  3/28/2023 - 5/2/2023- 8/15/2023 continue on 10/4 given adrenal insufficieny with recent am cortisol check and clinical symptoms on taper to lower dose of 4 mg daily    Belumosudil  Patient intolerant/with disease flares on tacrolimus and sirolimus see above.  Discussed with the patient the different options for therapy of chronic GVHD that are FDA approved.  Given the side effect profiles after discussing in detail and given the least nephrotoxicity and expected response, taking into account other metabolic effect as well.  We will proceed with prior authorization with belumosudil.  Patient was given material to review for possible expected adverse events and side effects with both Belumosodil and ruxolitinib.   --- Started on 10/18/2022 - skin/liver/oral GVHD inactive, and occular symptoms controlled/symptomatic improvement.   --- 10/10/2023 will hold belumosodil given recurrent infections ans significant improvement in symptoms with no end organ damage after discussions with him and his wife. Will optimize topical treatment with scleral lenses, eyedrops ans dex s/sp int he  interim to avoid flares. Will readdress need to restart systemic treatment pending infectious resolution and symptoms.      5.  ID:   # Recent history of Epidural abscess: Followed by Dr. Candelario ID, plan to be on IV ceftriaxone for at least 6 weeks course through 5/11/2023-to be determined on further follow-up.  See imaging with MRI follow-up as above.   #Recurrent discitis/OM still on treatment through October 2023, cx negative, managed with ID locally.  3/30/2023 blood culture with Staph epidermidis  3/31/2023 BONE, L5, FLUOROSCOPICALLY-GUIDED NEEDLE BIOPSY: Benign bone with trilineage hematopoiesis (normal) Negative for neoplasm Negative for acute osteomyelitis. DISC, L5-S1, ASPIRATION FOR CYTOLOGY: Acellular debris; no cellular material present for evaluation     #History of COVID x2 last 1/2023 and influenza    Treated with Paxlovid with persistent fatigue but no active respiratory symptoms  received his influenza annual vaccine as well as COVID boosters locally 11/16/2022     #History of pulmonary infiltrates:   4/20 CT chest with new RUL infiltrate. Highly immunocompromised. 4/22 Fungitell/asp GM were neg. BAL 4/29 NGTD, increased micafungin to 150mg IV daily-- f/u 6/1 Dr Garcia CT with mixed results, followed with Dr. Garcia August 2022 with resolution of pulmonary nodules and normalization of LFTs we will switch patient will switch patient to posaconazole prophylactic dose and monitor LFTs and any infectious concerns closely. Will continue till we determine durable discontinuation of IST given high risk history.      #History of CMV viremia:    - CMV viremia up to 1100. 4/15 started valcyte 900mg PO BID. 4/20 CMV <137, 5/10 ND, decreased to 450mg BID 4/30 - continue 4 weeks as long as CMV <137 till 5/28 + weekly CMV. Switched to acyclovir after completion of therapy 5/28/2022. recheck 3/1/23 ND     - PPx:   ACV  Posaconazole (previously on micafungin with LFts abl, hx of pulmonary nodules needign treatment  dose). Will continue till we determine durable discontinuation of IST given high risk history.   Pentamidine, last 9/21/2023. Will be due late october  Azithro 250mg daily (stopped levaquin due to possible tendonitis).   IgG1/2023 364, 4/2023 460      - EBV viremia: 4/20 CAP CT (w/ contrast): No adenopathy. S/p 4/22 and 5/4/22 rituximab 375mg/m2 5/10 ND x2, 6/7 ND, 8/18/2022 971, 3/28/22 843  S/p covid vaccine series 12/2021  S/p evusheld   Deferred annual vaccines in the setting of GVHD and immunosuppression therapy     6. Endo:   - Hx of graves disease; On synthroid follows with endocrine  - HLD: 11/2022 cholesterol 355, triglycerides 153, -- 11/8 started rosuvastatin 5 mg daily, 11/22 CPK within normal, 5/2/2023 repeat lipid profile cholesterol down to 202, triglycerides 191, LDL now normal at 95.  We will continue same dose of rosuvastatin and add fish oil 1g BID. Repeat August with PCP (not discussed today)     7. CV:   - Hypertension history   - TTE most recently 10/2022     8. GI:   -Omeprazole for heartburn  -LFTs as above, now normal. Off ursodiol     9. Psych:   - Situational anxiety - lexapro 10mg daily.   - Insomnia: worse on steroids. Ativan, trazadone, melatonin     10. MSK:   -History of left food drop, PT. Occasional muscle cramps discussed optimizing vitamin D levels and considering vitamin D, some of the symptomatology of muscle cramps are likely related to chronic GVHD.  No muscle cramps reported today.  -4/2023 new tearing of the right acetabular labrum anterosuperiorly referred to to orthopedic surgery.       11. HCM/Age appropriate cancer screening:  -Discussed with the patient importance of age-appropriate cancer screening including colonoscopies, he is due for this.  -Discussed importance of at least once a year vitamin D level check, 8/18/2022 wnl  -Screening for secondary iron overload, see heme section above  -Yearly TSH 2022 wnl; yearly lipid panel - see GI section above  -9/6/2022  DEXA scan Based on BMD diagnosis is consistent with normal bone density based on WHO criteria Ref. 1   -PFTs 9/20/22, see above  -Metabolic other, 8/2022 testosterone within normal  -11/22/2022 discussed with the patient the challenges and the stresses of chronic illness and healthcare fatigue.  Patient had previously been on Lexapro that we restarted confirmed with pharmacy that there are no drug drug interactions.  Additionally we will referred patient to PMNR to help with physical rehab and strength to help with fatigue.  Our social workers will also reach out to Nik and his wife for further resources including support groups for patients with chronic illness and fatigue post transplant.  -Referral to palliative care for symptom and pain management including insomnia     - RTC 11/14 or earlier as needed, will trial off belumosodil, discontinue magnesium/iron, zinc level pending, follow with ID for abx renal dose adjustment, monitor BP and add antiHTN  - follow with nephrology, PCP, follow-up locally with infectious disease, neurosurgery and pain management for management of osteomyelitis and pain control. PCP follow up for lipid profile, TSH, age appropriate cancer screening, discussed starting vaccinations since he is on low dose steroids and will be on long term IST.    I spent 45 minutes in the care of this patient today, which included time necessary for preparation for the visit, obtaining history, ordering medications/tests/procedures as medically indicated, review of pertinent medical literature, counseling of the patient, communication of recommendations to the care team, and documentation time.

## 2023-10-10 ENCOUNTER — ONCOLOGY VISIT (OUTPATIENT)
Dept: TRANSPLANT | Facility: CLINIC | Age: 63
End: 2023-10-10
Attending: INTERNAL MEDICINE
Payer: COMMERCIAL

## 2023-10-10 ENCOUNTER — TELEPHONE (OUTPATIENT)
Dept: NEPHROLOGY | Facility: CLINIC | Age: 63
End: 2023-10-10

## 2023-10-10 ENCOUNTER — LAB (OUTPATIENT)
Dept: LAB | Facility: CLINIC | Age: 63
End: 2023-10-10
Attending: INTERNAL MEDICINE
Payer: COMMERCIAL

## 2023-10-10 VITALS
TEMPERATURE: 97.9 F | OXYGEN SATURATION: 98 % | SYSTOLIC BLOOD PRESSURE: 160 MMHG | DIASTOLIC BLOOD PRESSURE: 80 MMHG | BODY MASS INDEX: 32.48 KG/M2 | HEART RATE: 80 BPM | RESPIRATION RATE: 16 BRPM | WEIGHT: 246.2 LBS

## 2023-10-10 DIAGNOSIS — C91.01 ACUTE LYMPHOBLASTIC LEUKEMIA (ALL) IN REMISSION (H): ICD-10-CM

## 2023-10-10 DIAGNOSIS — Z94.81 STATUS POST BONE MARROW TRANSPLANT (H): Primary | ICD-10-CM

## 2023-10-10 DIAGNOSIS — D89.813 GVHD AS COMPLICATION OF BONE MARROW TRANSPLANT (H): ICD-10-CM

## 2023-10-10 DIAGNOSIS — T86.09 GVHD AS COMPLICATION OF BONE MARROW TRANSPLANT (H): ICD-10-CM

## 2023-10-10 DIAGNOSIS — N17.9 AKI (ACUTE KIDNEY INJURY) (H): ICD-10-CM

## 2023-10-10 DIAGNOSIS — N17.9 AKI (ACUTE KIDNEY INJURY) (H): Primary | ICD-10-CM

## 2023-10-10 DIAGNOSIS — Z94.81 STATUS POST BONE MARROW TRANSPLANT (H): ICD-10-CM

## 2023-10-10 LAB
ALBUMIN SERPL BCG-MCNC: 4.1 G/DL (ref 3.5–5.2)
ALP SERPL-CCNC: 141 U/L (ref 40–129)
ALT SERPL W P-5'-P-CCNC: 26 U/L (ref 0–70)
ANION GAP SERPL CALCULATED.3IONS-SCNC: 14 MMOL/L (ref 7–15)
AST SERPL W P-5'-P-CCNC: 30 U/L (ref 0–45)
BASO+EOS+MONOS # BLD AUTO: ABNORMAL 10*3/UL
BASO+EOS+MONOS NFR BLD AUTO: ABNORMAL %
BASOPHILS # BLD AUTO: 0 10E3/UL (ref 0–0.2)
BASOPHILS NFR BLD AUTO: 0 %
BILIRUB SERPL-MCNC: 1 MG/DL
BUN SERPL-MCNC: 26 MG/DL (ref 8–23)
CALCIUM SERPL-MCNC: 10.2 MG/DL (ref 8.8–10.2)
CHLORIDE SERPL-SCNC: 101 MMOL/L (ref 98–107)
CREAT SERPL-MCNC: 1.56 MG/DL (ref 0.67–1.17)
DEPRECATED HCO3 PLAS-SCNC: 28 MMOL/L (ref 22–29)
EGFRCR SERPLBLD CKD-EPI 2021: 49 ML/MIN/1.73M2
EOSINOPHIL # BLD AUTO: 0.1 10E3/UL (ref 0–0.7)
EOSINOPHIL NFR BLD AUTO: 1 %
ERYTHROCYTE [DISTWIDTH] IN BLOOD BY AUTOMATED COUNT: 15.9 % (ref 10–15)
FERRITIN SERPL-MCNC: 1997 NG/ML (ref 31–409)
GLUCOSE SERPL-MCNC: 110 MG/DL (ref 70–99)
HCT VFR BLD AUTO: 32.6 % (ref 40–53)
HGB BLD-MCNC: 10.9 G/DL (ref 13.3–17.7)
IMM GRANULOCYTES # BLD: 0.1 10E3/UL
IMM GRANULOCYTES NFR BLD: 1 %
IRON BINDING CAPACITY (ROCHE): 283 UG/DL (ref 240–430)
IRON SATN MFR SERPL: 17 % (ref 15–46)
IRON SERPL-MCNC: 48 UG/DL (ref 61–157)
LYMPHOCYTES # BLD AUTO: 3 10E3/UL (ref 0.8–5.3)
LYMPHOCYTES NFR BLD AUTO: 23 %
MAGNESIUM SERPL-MCNC: 1.9 MG/DL (ref 1.7–2.3)
MCH RBC QN AUTO: 32.4 PG (ref 26.5–33)
MCHC RBC AUTO-ENTMCNC: 33.4 G/DL (ref 31.5–36.5)
MCV RBC AUTO: 97 FL (ref 78–100)
MONOCYTES # BLD AUTO: 1.7 10E3/UL (ref 0–1.3)
MONOCYTES NFR BLD AUTO: 13 %
NEUTROPHILS # BLD AUTO: 8.2 10E3/UL (ref 1.6–8.3)
NEUTROPHILS NFR BLD AUTO: 62 %
NRBC # BLD AUTO: 0 10E3/UL
NRBC BLD AUTO-RTO: 0 /100
PLATELET # BLD AUTO: 213 10E3/UL (ref 150–450)
POTASSIUM SERPL-SCNC: 3.5 MMOL/L (ref 3.4–5.3)
PROT SERPL-MCNC: 7 G/DL (ref 6.4–8.3)
RBC # BLD AUTO: 3.36 10E6/UL (ref 4.4–5.9)
SODIUM SERPL-SCNC: 143 MMOL/L (ref 135–145)
WBC # BLD AUTO: 13 10E3/UL (ref 4–11)

## 2023-10-10 PROCEDURE — 99000 SPECIMEN HANDLING OFFICE-LAB: CPT | Performed by: PATHOLOGY

## 2023-10-10 PROCEDURE — 99213 OFFICE O/P EST LOW 20 MIN: CPT | Performed by: INTERNAL MEDICINE

## 2023-10-10 PROCEDURE — 85025 COMPLETE CBC W/AUTO DIFF WBC: CPT | Performed by: PATHOLOGY

## 2023-10-10 PROCEDURE — 36415 COLL VENOUS BLD VENIPUNCTURE: CPT | Performed by: PATHOLOGY

## 2023-10-10 PROCEDURE — 82728 ASSAY OF FERRITIN: CPT | Performed by: PATHOLOGY

## 2023-10-10 PROCEDURE — 81170 ABL1 GENE: CPT | Mod: 90 | Performed by: PATHOLOGY

## 2023-10-10 PROCEDURE — 80053 COMPREHEN METABOLIC PANEL: CPT | Performed by: PATHOLOGY

## 2023-10-10 PROCEDURE — 84630 ASSAY OF ZINC: CPT | Mod: 90 | Performed by: PATHOLOGY

## 2023-10-10 PROCEDURE — 99215 OFFICE O/P EST HI 40 MIN: CPT | Performed by: INTERNAL MEDICINE

## 2023-10-10 PROCEDURE — 83735 ASSAY OF MAGNESIUM: CPT | Performed by: PATHOLOGY

## 2023-10-10 PROCEDURE — 83550 IRON BINDING TEST: CPT | Performed by: PATHOLOGY

## 2023-10-10 PROCEDURE — 83540 ASSAY OF IRON: CPT | Performed by: PATHOLOGY

## 2023-10-10 ASSESSMENT — PAIN SCALES - GENERAL: PAINLEVEL: NO PAIN (0)

## 2023-10-10 NOTE — LETTER
10/10/2023         RE: Nik Nava  [id number] [facility] [id number]        Dear Colleague,    Thank you for referring your patient, Nik Nava, to the [facility]LO[facility] AND [facility]ANSPL[facility] [place]. Please see a copy of my visit note below.        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    HPI:    Nik returns to clinic for follow up. He is disappointed understandably with the recurrence of discitis/OM, and second long course of treatment, was seen by ID and planned to continue treatment and referred to [facility], pain however is better controlled on fentanyl but causing significant fatigue and sleepiness. He is followed for adjusting his pain regimen with PCP locally.  In terms of chronic GVHD symptoms are better controlled overall and stable, using eyedrops 3-4 times a day only down from more than 10 times per day earlier in the summer, not using scleral lenses regularly but believes ocular lenses have made a significant difference in his quality of life and ocular symptoms.  No new oral sores or ulcers however he does have persistent mild lichenoid changes with a healed epithelialized ulcer on bilateral buccal mucosa.   No new respiratory symptoms.  No chest pain.  No nausea vomiting or diarrhea.  No bleeding and no headaches.    Review of Systems                                                                                                                                         10 point Review of systems was negative except as detailed above     PHYSICAL EXAM                                                                                                                                                   KPS: 60-70      Wt Readings from Last 4 Encounters:   09/26/23 110.7 kg (244 lb)   08/15/23 110.8 kg (244 lb 4.8 oz)   08/07/23 108.4 kg (239 lb)   08/07/23 108.4 kg (239  lb)      General: NAD.  HEENT: sclera anicteric, injected conjunctivae. Mild patchy lichenoid changes in oral mucosa with prominent area of healed in the right/left mid buccal mucosa -10/10 less prominent on the right today with overall improved appearence and more normal appearing mucosal tissue, no ulcers seen.    Lungs:  CTA b/l  no wheezes  CV: RRR  no gallop  Abd; soft, NABS, protuberant  Ext/ Skin: Skin bruising on bilateral forearms, wrapped right forarm after recent fall, pictures reviewed, fainting of previous hyperpigmented areas of previously known cGVHD  LE 1+ bilateral edema in lower extremities, left>right noted today; stable    cGVHD therapy started on February 24 2022  - Baseline NIH scoring 2/24/2022 : skin 2 maculopapular rash/erythema with no sclerotic features, mouth score 1 mild symptoms with lichenoid changes less than 25%, eyes score 2 moderate symptoms without new visual impairments due to KCS requiring lubricant eyedrops more than 3 times a day, overall mild scale for her provider  - 3/25/2022 1 month NIH treatment assessment on PQRST: skin 2, mouth score 1 mild symptoms with NO lichenoid changes, eyes score 2 moderate symptoms w requiring lubricant eyedrops more than 3 times a day, liver score 1 ALT 3.7x ULN and nl bilirubin   - 3/31  Mouth clear; eyes minimal LFT still abn; no joint or new GI sx  - 4/28 Mouth clear, Left>R eye is mild sclera erythema, lids are pink and irritated appearing, LFT's slow improvement, no new joint, skin, or GI symptoms.   -5/11 mouth with mild erythema but no lichenoid changes, eyes using eyedrops 4-6 times a day score of 2, LFTs significantly improved from baseline slight elevation in alk phos and AST with normal bilirubin, skin with hyperpigmentation from old acute GVHD no active skin rashes, no GI symptoms no musculoskeletal complaints.  -5/26 Mouth with very slight lichenoid changes, erythema.  Eyes still using eyedrops 4-6x per day.  LFTs essentially  normal with slight elevation in alk phos.  Skin with hyperpigmentation from old acute GVHD, no active rashes, no GI or MSK concerns.  Skin 0, mouth 0 but with lichenoid changes, eyes 2  -6/7/22 Mouth with no lichenoid changes, erythema.  Eyes still using eyedrops 4-6x per day.  LFTs essentially normal with slight elevation in alk phos.  Skin with hyperpigmentation from old acute GVHD, no active rashes, no GI or MSK concerns.  Skin 0, mouth 0, eyes 2     -6 month PQRST assessment 9/6/2022: eyes 3, mouth 0 for symptoms, 25% lichenoid changes (1), liver/GI/skin 0    11/8/2022, 11/22/2022, 12/13/22 cGVHD NIG scoring eyes 3, no other organ involvement clinically  3/28/23 cGVHD NIG scoring eyes 3, no other organ involvement clinically  5/2/23 cGVHD NIG scoring eyes 3, oral 1, no other organ involvement clinically  6/13/23 cGVHD NIG scoring eyes 3, oral 1, no other organ involvement clinically  8/15/23 cGVHD NIG scoring eyes 3 (down to 3-4 times eye drop from >10 but still using scleral lenses), oral 1, no other organ involvement clinically  10/10/2023 cGVHD NIG scoring eyes 2-3 (down to 3-4 times eye drop from >10 but still using scleral lenses), oral 1, no other organ involvement clinically  Lab:    Lab Results   Component Value Date    WBC 9.8 08/15/2023    ANEU 3.5 10/26/2021    HGB 11.8 (L) 08/15/2023    HCT 34.3 (L) 08/15/2023     08/15/2023     (L) 08/15/2023    POTASSIUM 4.7 08/15/2023    CHLORIDE 99 08/15/2023    CO2 28 08/15/2023     (H) 08/15/2023    BUN 32.5 (H) 08/15/2023    CR 1.18 (H) 08/15/2023    MAG 2.0 08/15/2023    INR 0.90 12/01/2022    BILITOTAL 0.6 08/15/2023    AST 26 08/15/2023    ALT 17 08/15/2023    ALKPHOS 117 08/15/2023    PROTTOTAL 6.3 (L) 08/15/2023    ALBUMIN 4.0 08/15/2023     4/28/2023    MRI Lumbar spine  1.  The previously seen ventral epidural abscess has resolved with small amount of residual ventral epidural phlegmon.   2.  Marrow edema about the L5-S1 endplates  has mildly worsened within new rim-enhancing subchondral lesion in the left S1 superior endplate. This could reflect progression of osteomyelitis.      MRI hip right  1.  No evidence of septic arthritis of the right hip joint.   2.  There is degeneration and likely tearing of the right acetabular labrum anterosuperiorly, and probably low-grade chondromalacia of the right hip joint.   3.  Abnormality at L5-S1 compatible with known discitis-osteomyelitis.    ASSESSMENT AND PLAN   Nik Nava is a 63 year old male with Ph Pos ALL, day 790 s/p sib allo stem cell transplant    Day -6 (8/4): flu/cy  Day -5 through day -2 (8/5-8/8): flu  Day -1 (8/9): TBI  Day 0 (8/10): transplant     1.  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 S/p allo sib PBSCT. (ABO matched)  HCT-CI score: 3 (prior solid tumor)  Day +100 bone marrow biopsy is 100% donor with no morphologic or flow cytometric evidence of leukemia BCR abl is pending.  - Day +180 restaging BM bx- still in CR  100% donor;  2/16/22 BCR ABL major breakpoint undetectable.   - 1 year restaging 8/18/2022: Markedly hypocellular bone marrow [less than 10% cellular] with maturing trilineage hematopoiesis and no definite morphologic or immunophenotypic evidence for involvement by B lymphoblastic leukemia. BCRABL1 PCR not detected, bone marrow % donor. FISH NORMAL No evidence of BCR-ABL1 fusion  - Maintenance with TKI posttransplantation given his Ph positive ALL that have not been started previously presumed secondary to multiple active issues specifically infections and COVID.  Per Avila Tobar: will reconsider this patient will think about whether this is something he would like to consider he was previously on Dasatinib, we would start on a low dose and increase as tolerated.  This is on hold now pending active GVHD therapy, we discussed on this visit 10/18 in agreement with holding off.  - 2 year restaging 8/7/2023 BMB: - No  morphologic or immunophenotypic evidence of recurrent/persistent B-lymphoblastic leukemia. Slightly hypocellular marrow (10-20% estimated cellularity, patchy and variable), with trilineage hematopoetiic maturation and less than 2% blasts. Peripheral blood showing slight normocytic normochromic anemia and slight thrombocytopenia. BM % donor. FISH No evidence of BCR::ABL1 fusion /CG No recipient (male) cells  or cells with a t(9;22) or other clonal chromosomal abnormality were  detected among the 20 metaphases analyzed.      2.  HEME: CBC stable.   3/2023 iron low 28, iron saturation index 10%, transferrin 225, ferritin 1381 down trending (in retrospect that was the time of epidural abscess as well).  B12, folate normal.  High copper and zinc borderline low, 5/2023 started zinc.  Started iron replacement in 4/2023, recheck iron profile on follow up more consistent with AOCD, will discontinue iron. Note ferritin elevation is int he setting of discitis/OM.    3.  FEN/Renal: Creatinine 0.97, s/p nephrectormy, nephrology following     4.    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   10/10/2023 cGVHD NIG scoring eyes 2-3, oral 1, no other organ involvement clinically   #Skin GVHD- history of biopsy proven GVHD of the skin 8/30/2021; resolved.   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 WNL today   # Ocular GVHD: follows with ophthalmology. Maxitrol to lids, continue refreshing drops QID. Score 3, but down to 3-4 times eye drops from >10/day. Not needing Sl all the time.  Followed closely by Dr. Juarez with significant improvement with scleral lenses and eyedrops  # Oral GVHD: dex s/s <2-3 x.  Lichenoid changes have largely resolved with no open ulcers since 11/8/2022, except for improving lichenoid changes to the mid right/left buccal mucosa    Previous therapies  Tacrolimus-previously decided to stay on same dose, no checks of levels if  clinically stable, and no need switch to sirolimus. (keep tac low as gvhd is quiet and viremia). 5/10 level 45 with starting of COVID therapy and D-D intractions (Paxlovid for COVID19, which has a drug interaction with tacrolimus-Ritonavir increases serum levels of tacrolimus).   Tacrolimus level subtherapeutic, increase to 2.5 mg twice daily recently, 8/23/2022 instructed to change tacrolimus to 2 mg twice daily. 9/6 with mild NEGRA, hyperkalemia, hypomagnesemia, and reports some tremors and cramps, suspect tacrolimus to be high therefore empirically decreased to 1.5 twice daily, skip morning dose of tacrolimus and took 2 mg today after his afternoon labs. Decrease to 1mg BID on Saturday.  Sirolimus  9/21 despite fluids and a dose adjustment of tacrolimus patient seem to have persistence NORBERTO related NEGRA, therefore after discussion with the patient decision was made to transition to sirolimus that was started on 9/21, patient initially seem to tolerate this well with normalization of kidney function  10/11 presented with flares of ocular and oral GVHD, fluid retention and gain of 5-6 kg, lower extremity edema, abdominal distention, total protein to creatinine ratio elevated at 0.4, in addition to elevation in BUN and creatinine, HTN.  Picture most consistent with sirolimus related side effects.  Less likely that the patient will tolerate even a lower level of sirolimus.  Therefore the patient was instructed to stop sirolimus, last dose on 10/10. 10/14 level 3.5 appropriately trending down.     Corticosteroids  3/1-3/16: prednisone 100mg every day  3/17 75mg every day   3/31 75 alt with 50  4/6: 75 alt with 25mg every other day  4/12 pred tapered to 60 alternating with 25mg   4/15 In setting of viruses trying to reactivate,GVHD stable: Taper 60/15mg alternating.  4/20 decrease pred further to 50/10.    4/25 taper pred to 50/0 every other day as long as symptoms stable.   5/2 Pred decrease 40/0 alternating every other  day.   5/13 decrease to 30/0  5/20 decrease to 20/0 then slowly by 5 mg weekly  6/7 decrease to 10/0  Went up to 15/0 with mild increased LFTs  8/23/2022 increased to 20/0, with persistence of oral ulcers and active ocular GVHD.  Discussed with the patient the importance of stopping chewing of nicotine gums for prolonged hours as that would not help with the healing of the oral ulcers.  I discussed with the patient alternatives of nicotine patches that he will reconsider and let me know.  9/6 decreased to 15 alternating with 0  9/13 decreased to 10 alternating with 0  9/21 discontinued tacrolimus given persistent NEGRA and single kidney and start the patient on sirolimus without loading dose.  We will get a list of possible side effects and adverse events from the Pharm.D. see sirolimus section above.  10/11 GVHD flare. Sirolimus stopped. Resume prednisone 40 mg daily as of 10/12, no change with mildly worsening eye pain 10/14  Reduce pred to 35mg 10/25 and 25mg 11/1 11/8 20 mg   11/22 15 mg  12/13/22 10 mg (plan to taper to 5mg then slow taper 1 mg per month given long pred history)  2/23/23 lower from 5 mg to 4 mg for the next month  3/28/2023 - 5/2/2023- 8/15/2023 continue on 10/4 given adrenal insufficieny with recent am cortisol check and clinical symptoms on taper to lower dose of 4 mg daily    Belumosudil  Patient intolerant/with disease flares on tacrolimus and sirolimus see above.  Discussed with the patient the different options for therapy of chronic GVHD that are FDA approved.  Given the side effect profiles after discussing in detail and given the least nephrotoxicity and expected response, taking into account other metabolic effect as well.  We will proceed with prior authorization with belumosudil.  Patient was given material to review for possible expected adverse events and side effects with both Belumosodil and ruxolitinib.   --- Started on 10/18/2022 - skin/liver/oral GVHD inactive, and occular  symptoms controlled/symptomatic improvement.   --- 10/10/2023 will hold belumosodil given recurrent infections ans significant improvement in symptoms with no end organ damage after discussions with him and his wife. Will optimize topical treatment with scleral lenses, eyedrops ans dex s/sp int he interim to avoid flares. Will readdress need to restart systemic treatment pending infectious resolution and symptoms.      5.  ID:   # Recent history of Epidural abscess: Followed by Dr. Candelario ID, plan to be on IV ceftriaxone for at least 6 weeks course through 5/11/2023-to be determined on further follow-up.  See imaging with MRI follow-up as above.   #Recurrent discitis/OM still on treatment through October 2023, cx negative, managed with ID locally.  3/30/2023 blood culture with Staph epidermidis  3/31/2023 BONE, L5, FLUOROSCOPICALLY-GUIDED NEEDLE BIOPSY: Benign bone with trilineage hematopoiesis (normal) Negative for neoplasm Negative for acute osteomyelitis. DISC, L5-S1, ASPIRATION FOR CYTOLOGY: Acellular debris; no cellular material present for evaluation     #History of COVID x2 last 1/2023 and influenza    Treated with Paxlovid with persistent fatigue but no active respiratory symptoms  received his influenza annual vaccine as well as COVID boosters locally 11/16/2022     #History of pulmonary infiltrates:   4/20 CT chest with new RUL infiltrate. Highly immunocompromised. 4/22 Fungitell/asp GM were neg. BAL 4/29 NGTD, increased micafungin to 150mg IV daily-- f/u 6/1 Dr Garcia CT with mixed results, followed with Dr. Garcia August 2022 with resolution of pulmonary nodules and normalization of LFTs we will switch patient will switch patient to posaconazole prophylactic dose and monitor LFTs and any infectious concerns closely. Will continue till we determine durable discontinuation of IST given high risk history.      #History of CMV viremia:    - CMV viremia up to 1100. 4/15 started valcyte 900mg PO BID. 4/20 CMV  <137, 5/10 ND, decreased to 450mg BID 4/30 - continue 4 weeks as long as CMV <137 till 5/28 + weekly CMV. Switched to acyclovir after completion of therapy 5/28/2022. recheck 3/1/23 ND     - PPx:   ACV  Posaconazole (previously on micafungin with LFts abl, hx of pulmonary nodules needign treatment dose). Will continue till we determine durable discontinuation of IST given high risk history.   Pentamidine, last 9/21/2023. Will be due late october  Azithro 250mg daily (stopped levaquin due to possible tendonitis).   IgG1/2023 364, 4/2023 460      - EBV viremia: 4/20 CAP CT (w/ contrast): No adenopathy. S/p 4/22 and 5/4/22 rituximab 375mg/m2 5/10 ND x2, 6/7 ND, 8/18/2022 971, 3/28/22 843  S/p covid vaccine series 12/2021  S/p anuradha   Deferred annual vaccines in the setting of GVHD and immunosuppression therapy     6. Endo:   - Hx of graves disease; On synthroid follows with endocrine  - HLD: 11/2022 cholesterol 355, triglycerides 153, -- 11/8 started rosuvastatin 5 mg daily, 11/22 CPK within normal, 5/2/2023 repeat lipid profile cholesterol down to 202, triglycerides 191, LDL now normal at 95.  We will continue same dose of rosuvastatin and add fish oil 1g BID. Repeat August with PCP (not discussed today)     7. CV:   - Hypertension history   - TTE most recently 10/2022     8. GI:   -Omeprazole for heartburn  -LFTs as above, now normal. Off ursodiol     9. Psych:   - Situational anxiety - lexapro 10mg daily.   - Insomnia: worse on steroids. Ativan, trazadone, melatonin     10. MSK:   -History of left food drop, PT. Occasional muscle cramps discussed optimizing vitamin D levels and considering vitamin D, some of the symptomatology of muscle cramps are likely related to chronic GVHD.  No muscle cramps reported today.  -4/2023 new tearing of the right acetabular labrum anterosuperiorly referred to to orthopedic surgery.       11. HCM/Age appropriate cancer screening:  -Discussed with the patient importance of  age-appropriate cancer screening including colonoscopies, he is due for this.  -Discussed importance of at least once a year vitamin D level check, 8/18/2022 wnl  -Screening for secondary iron overload, see heme section above  -Yearly TSH 2022 wnl; yearly lipid panel - see GI section above  -9/6/2022 DEXA scan Based on BMD diagnosis is consistent with normal bone density based on WHO criteria Ref. 1   -PFTs 9/20/22, see above  -Metabolic other, 8/2022 testosterone within normal  -11/22/2022 discussed with the patient the challenges and the stresses of chronic illness and healthcare fatigue.  Patient had previously been on Lexapro that we restarted confirmed with pharmacy that there are no drug drug interactions.  Additionally we will referred patient to PMNR to help with physical rehab and strength to help with fatigue.  Our social workers will also reach out to Nik and his wife for further resources including support groups for patients with chronic illness and fatigue post transplant.  -Referral to palliative care for symptom and pain management including insomnia     - RTC 11/14 or earlier as needed, will trial off belumosodil, discontinue magnesium/iron, zinc level pending, follow with ID for abx renal dose adjustment, monitor BP and add antiHTN  - follow with nephrology, PCP, follow-up locally with infectious disease, neurosurgery and pain management for management of osteomyelitis and pain control. PCP follow up for lipid profile, TSH, age appropriate cancer screening, discussed starting vaccinations since he is on low dose steroids and will be on long term IST.    I spent 45 minutes in the care of this patient today, which included time necessary for preparation for the visit, obtaining history, ordering medications/tests/procedures as medically indicated, review of pertinent medical literature, counseling of the patient, communication of recommendations to the care team, and documentation time.    Akshat Gramajo  MD Amadou

## 2023-10-10 NOTE — TELEPHONE ENCOUNTER
[facility] Call Center    Phone Message    May a detailed message be left on voicemail: no     Reason for Call: Other: Patient has had High blood pressure all day and they were wondering if they need to see someone before there appointment. Patients spouse stated that patient had blood pressure of 153/81 and they just had an appointment where it was 170/90. Please call to follow up.      Action Taken: Message routed to:  [facility] Nephrology    Travel Screening: Not Applicable

## 2023-10-10 NOTE — NURSING NOTE
"Oncology Rooming Note    October 10, 2023 10:34 AM   Nik Nava is a 63 year old male who presents for:    Chief Complaint   Patient presents with    Oncology Clinic Visit     BMT; ALL (acute lymphoblastic leukemia)      Initial Vitals: BP (!) 174/90 (BP Location: Left arm, Patient Position: Sitting, Cuff Size: Adult Regular)   Pulse 80   Temp 97.9  F (36.6  C) (Oral)   Resp 16   Wt 111.7 kg (246 lb 3.2 oz)   SpO2 98%   BMI 32.48 kg/m   Estimated body mass index is 32.48 kg/m  as calculated from the following:    Height as of 9/26/23: 1.854 m (6' 1\").    Weight as of this encounter: 111.7 kg (246 lb 3.2 oz). Body surface area is 2.4 meters squared.  No Pain (0) Comment: Data Unavailable   No LMP for male patient.  Allergies reviewed: Yes  Medications reviewed: Yes    Medications: Medication refills not needed today.  Pharmacy name entered into Venu[facility]:    [facility] - [place], [place] - [id number] [facility] [place], [place] - [address] [facility] [phone]  [facility]    Clinical concerns: NONE       Merry Gomez              "

## 2023-10-11 ENCOUNTER — MYC MEDICAL ADVICE (OUTPATIENT)
Dept: PALLIATIVE MEDICINE | Facility: CLINIC | Age: 63
End: 2023-10-11
Payer: MEDICARE

## 2023-10-11 DIAGNOSIS — M54.10 RADICULAR PAIN OF RIGHT LOWER EXTREMITY: ICD-10-CM

## 2023-10-11 DIAGNOSIS — M54.50 LUMBAR SPINE PAIN: ICD-10-CM

## 2023-10-11 DIAGNOSIS — G06.1 ABSCESS IN EPIDURAL SPACE OF LUMBAR SPINE: ICD-10-CM

## 2023-10-11 NOTE — TELEPHONE ENCOUNTER
Chart reviewed - I am okay if he would like to decrease to 25 mcg patch. I believe he has one leftover 25 mcg patch. Then, I can send in a new prescription tomorrow.     Akilah Shah DNP, APRN, AGNP-C  [facility]      Composite Graft Text: The defect edges were debeveled with a #15 scalpel blade.  Given the location of the defect, shape of the defect, the proximity to free margins and the fact the defect was full thickness a composite graft was deemed most appropriate.  The defect was outline and then transferred to the donor site.  A full thickness graft was then excised from the donor site. The graft was then placed in the primary defect, oriented appropriately and then sutured into place.  The secondary defect was then repaired using a primary closure.

## 2023-10-11 NOTE — TELEPHONE ENCOUNTER
Genaro Isbell,      This is Lamar Nik s spouse helping with this message. Nik has continued to feel over-sedated, hardly able to stay awake. And when he is awake, he doesn t feel great. His creatinine is running high from the Vancomycin trough result being slightly high, so that may also be why he doesn t feel good??     His pain has been relatively managed with Tylenol, and he has not needed to take oxy. Would you consider lowering the fentanyl dose again? I could pick it up today as his fentanyl patch is due to be changed today.     Thank you,  Nik and Lamar

## 2023-10-11 NOTE — TELEPHONE ENCOUNTER
Communicated recommendations to patient who verbalizes understanding and identifies he does currently have a 25mcg patch available.       Routing prepped medication orders to provider.     Natty Tao RN  [facility]  [phone]

## 2023-10-12 ENCOUNTER — TELEPHONE (OUTPATIENT)
Dept: NEPHROLOGY | Facility: CLINIC | Age: 63
End: 2023-10-12
Payer: MEDICARE

## 2023-10-12 LAB — ZINC SERPL-MCNC: 83 UG/DL

## 2023-10-12 RX ORDER — FENTANYL 25 UG/1
1 PATCH TRANSDERMAL
Qty: 5 PATCH | Refills: 0 | Status: SHIPPED | OUTPATIENT
Start: 2023-10-12 | End: 2023-11-14

## 2023-10-12 NOTE — TELEPHONE ENCOUNTER
[facility] Call Center    Phone Message    May a detailed message be left on voicemail: no     Reason for Call: Other: Patients spouse is calling wondering if they need labs before their appointment on Monday. They would also like to go over a medication that was recommended to them. Please follow up with patients spouse to discuss further.     Action Taken: Message routed to:  [facility] Nephrology    Travel Screening: Not Applicable

## 2023-10-12 NOTE — TELEPHONE ENCOUNTER
Call to patient. Spoke to patients wife. She reports that BP was still high 170/90 last night. She gave him amlodipine 5 mg at night and this morning BP was 160/84. The home care nurse was there today and reports BP to be 152/76. She was there to redraw kidney function lab. Patient is still on vanco 1250 and ertapenem 1 gram daily.  Reviewed with Dr Chew, please add on C3, C4, CRP, ESR, urine protein an UA and inform patient ok to take amlodipine 5 mg HS. Labs taken by [facility]. VO given.      Writer informed patient of recommendations.  Will follow up on Monday  Gita Moore LPN  Nephrology  [phone]

## 2023-10-13 NOTE — TELEPHONE ENCOUNTER
Contacted Lamar who states that patient's pain is being well managed currently.     Natty Tao RN  [facility]  [phone]

## 2023-10-16 ENCOUNTER — OFFICE VISIT (OUTPATIENT)
Dept: NEPHROLOGY | Facility: CLINIC | Age: 63
End: 2023-10-16
Attending: INTERNAL MEDICINE
Payer: COMMERCIAL

## 2023-10-16 VITALS
HEART RATE: 75 BPM | TEMPERATURE: 97.8 F | DIASTOLIC BLOOD PRESSURE: 78 MMHG | HEIGHT: 73 IN | OXYGEN SATURATION: 97 % | SYSTOLIC BLOOD PRESSURE: 136 MMHG | BODY MASS INDEX: 32.08 KG/M2 | RESPIRATION RATE: 20 BRPM | WEIGHT: 242.1 LBS

## 2023-10-16 DIAGNOSIS — N18.2 CKD (CHRONIC KIDNEY DISEASE) STAGE 2, GFR 60-89 ML/MIN: ICD-10-CM

## 2023-10-16 DIAGNOSIS — T86.09 GVHD AS COMPLICATION OF BONE MARROW TRANSPLANT (H): ICD-10-CM

## 2023-10-16 DIAGNOSIS — C91.01 ACUTE LYMPHOBLASTIC LEUKEMIA (ALL) IN REMISSION (H): ICD-10-CM

## 2023-10-16 DIAGNOSIS — D89.813 GVHD AS COMPLICATION OF BONE MARROW TRANSPLANT (H): ICD-10-CM

## 2023-10-16 DIAGNOSIS — N17.9 AKI (ACUTE KIDNEY INJURY) (H): Primary | ICD-10-CM

## 2023-10-16 PROCEDURE — 99215 OFFICE O/P EST HI 40 MIN: CPT | Performed by: INTERNAL MEDICINE

## 2023-10-16 PROCEDURE — 99213 OFFICE O/P EST LOW 20 MIN: CPT | Performed by: INTERNAL MEDICINE

## 2023-10-16 ASSESSMENT — PAIN SCALES - GENERAL: PAINLEVEL: NO PAIN (1)

## 2023-10-16 NOTE — PROGRESS NOTES
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      Oncologic history: The patient was diagnosed with Ph+ ALL in 2020 after presented with feeling unwell and found to have leukocytosis and blast in his blood. He was treated with Dex and Dasatinib then 2 cycles of vincristine, prednisone, daunorubicin, and pegasparaginase with intrathecal methotrexate. Last ly, he received 1 course of high dose Jaymie-C. He achieved CR with no MRD. Subsequently, he underwent allo sib NMA (flu/cy+TBI) PBSCT on  8/10/21. hematocrit-CI was 3.  The patient was noted to be in CR with BmBx at D100, D180 and 1Y showing 100% donor. He has not been started on TKI due to previous multiple active issues. Post Tx complication included cGVHD at skin, eyes, o liver (all Bx proven, except eyes, clinically) started since 2/22. The current active cGVHDD involved eyes and skin. He was on Tacrolimus for cGVHD but later discontinued due to NEGRA, hyperkalemia hypomagnesemia, tremors and cramps in 9/22. Sirolimus was started in 9/21/22 in replacement of Tac. Cr improved, but the presented on 10/11/22 with ocular and cGVHD flare, fluid retention and gain 5-6 kg. BMT thought this is sirolimus Tox? (of note UPCR was only 0.4 and normal SAlb). Sirolimus was then stopped. Prednisone was started at 40 mg with slow tapering. He was also started on Belumosudil on 10/18/22 (ROCK2 inhibitors:Rho-associated coiled-coil kinase )  Other pertinent Hx:   - He had PE in the setting of receiving PEG asparaginase and was treated with Xarelto which is now completed.   - He had hyperferritinemia which now being followed closely. Not on iron chelators.  - He had COVID -19 in 5/22.  - CMV viremia  - Grave disease  -  HTN  Kidney history: He had RCC s/p Rt nephrectomy in 2007.  The patient has baseline creatinine of 0.9-1 pretransplant.  He developed NEGRA in 9/21 with Cr peaked at 1.89 but then come down to baseline. He had NEGRA again in 9/22 with Cr peaked at 1.8, suspect that due to tacrolimus toxicity and it was stopped. Cr improved but now still fluctuates between 1.1-1.3.  The most recent creatinine was on 10/31/2022 at 1.08.  Serum albumin is 3.3. UCPR is 0.39 g/g on 10/18/22. UA on 9/12/21 and 10/18/22 showed no protein, no blood.  However UA on 9/12/2021 showed 8 hyaline cast.      11/1/22: He is doing well except that he still has LE edema. It has improved from when he was on Sirolimus but still quite a bit. Edema has actually started since 2021 but unclear when.But it is certainly getting worse lately when he was treated with high dose steroid and sirolimus.His cGVHD is o/eva all stable but not completely gone. He was just started Belumosudil on 10/18/22 for cGVHD. He just saw BMT today and they are decreasing his steroid.Amlodipine started since 8/21 and possibly related to his swelling. He has multiple SEs from steroid such as weight gain , central obesity, easily bruised, increased appetite and partial insomnia. He has some nocturia. He has no dysuria or hematuria. Recently, he had cat scrath incidence and being treated with doxycycline. He has abdominal distension and gained weight progressively. He drinks 120 Oz of tea per day. We started chlorthalidone and reduced amlodipine.   12/1-12/3/22: Was admitted for dyspnea and malaise concerning for belumosudil intolerance. However, at the same time he also had flu A positive. He was taken off of amlodipine on 12/6/22. And subsequently chlorthalidone was off in 12/13/22 when Na was 128. Na improved afterwards.   1/10/23:  He feels good today. His BP are now very good 110-130/70 mmHg. He is not on any BP medication ayt this time. He is taking Belumosidil. Symptoms of cGVHD  have been improving.  He has some LE edema and easily bruised likely from steroid/belumosudil.   Labs: Creatinine 1.11, potassium 4.8,, dioxide 28, phosphorus 2.3, albumin 4.2, calcium 10.1.  Hemoglobin 11.3, platelet 140. UA pending.   6/13/23: In the interim, he went to the ED on 5/14/23 for testicular pain. CT showed no hernia or mass. Hew was also diagnosed with spine osteomyelitis and Epidural abscess and being treated with Ceftraixone for 6 weeks. Noted stable cGVHD. He also had COVID in 1/23. Now on physiologica dose of steroid. Still has dry eyes and a bit of mouth sore.  He drinks about 3-4 glasses of milk per day. He still has back pain that radiating downt to right leg. He completed ceftriaxone in 5/25/23. Appetite is stable as well as his weight. He has minimal swelling. He is on prednisone 4 mg alternate with 10 mg per day. Labs 6/13/23: Cr 1.10, BUN 46.2, K 5.1, Calcium 10.3, Phos 3.1, Albumin 4.0. Hb 11.7, Platelet 182, WBC 10.1.   10/16/23: In interim, he was diagnosed with L5-S1 discitis osteomyelitis at [facility] after presented with ongoing back pain.  MRI showing findings consistent with ongoing discitis-osteomyelitis at L5-S1 which has progressed since last imaging in June. He was seen by ID and neurosurgery. He underwent disc aspiration.  ID is recommending another 6 weeks course of antibiotics with a different regimen: Invanz and vancomycin. Neurosurgery did not recommend any surgical intervention at this time. Vanco dose was reduced on 10/10/23 due to kidney function and stopped last Thursday when Cr was 1.87. Lately, his blood pressure has been increasing to 150-160 and sometimes 180. I asked him to resume amlodipine 5 mg.  After stopping vancomycin, BP has improved along with his appetite. Cr has improved  to 1.68 on 10/16/23 ([facility] labs). He has gained some weight. His swelling was worse when he was admitted in the hospital. Now he is on Ertapenenm and clindamycin oral until 10/23.  Pain is mostly when he lays too long and going to get up. He is still using fentanyl patch but the dose was reduced. Belumosidil was just on hold.  Labs on 10/10/2023 showed creatinine 1.56 but Cr 1.678 on 10/16/23 ([facility] lab), BUN 26, GFR 49, potassium 3.5,, dioxide 28, calcium 10.2, albumin 4.1, alkaline phosphatase 141.  CBC show white blood cell 13, hemoglobin 10.9, platelet 213. Vancomycin at [facility] was 21.4 on 10/9/23. CRP 2.2 and ESR 46. UA on 10/12/23 showed no blood but UPCR is 0.2 g/g.     Past medical history  Past Medical History:   Diagnosis Date    ALL (acute lymphocytic leukemia) (H)     CKD (chronic kidney disease)     GVHD (graft versus host disease) (H)     H/O peripheral stem cell transplant (H)     Hyperthyroidism 1996    Infection due to 2019 novel coronavirus 01/16/2023    Influenza A 11/2022    Postablative hypothyroidism 1997    Pulmonary embolism (H)     Renal cell carcinoma (H) 2007    right kidney    Sleep apnea        Past surgical history  Past Surgical History:   Procedure Laterality Date    BACK SURGERY  2017    BONE MARROW BIOPSY, BONE SPECIMEN, NEEDLE/TROCAR Left 09/02/2021    Procedure: BIOPSY, BONE MARROW;  Surgeon: Jailyn Ny APRN CNP;  Location: [facility] OR    BONE MARROW BIOPSY, BONE SPECIMEN, NEEDLE/TROCAR Left 11/15/2021    Procedure: BIOPSY, BONE MARROW;  Surgeon: Socrates De La Torre;  Location: [facility] OR    BONE MARROW BIOPSY, BONE SPECIMEN, NEEDLE/TROCAR Left 02/07/2022    Procedure: BIOPSY, BONE MARROW;  Surgeon: Renetta Wiggins PA-C;  Location: [facility] OR    BONE MARROW BIOPSY, BONE SPECIMEN, NEEDLE/TROCAR Left 08/18/2022    Procedure: BIOPSY, BONE MARROW;  Surgeon: Lorrie Yap PA-C;  Location: [facility] OR    BONE MARROW BIOPSY, BONE SPECIMEN, NEEDLE/TROCAR Left 8/7/2023    Procedure: Bone marrow biopsy, bone specimen, needle/trocar;  Surgeon: Teressa Rick PA-C;  Location: [facility] OR    BRONCHOSCOPY (RIGID OR FLEXIBLE), DIAGNOSTIC N/A 04/29/2022     Procedure: BRONCHOSCOPY, WITH BRONCHOALVEOLAR LAVAGE;  Surgeon: Murtaza Garcia MD;  Location: [facility] GI    IR CVC TUNNEL PLACEMENT > 5 YRS OF AGE  08/04/2021    IR CVC TUNNEL REMOVAL LEFT  09/02/2021    IR LIVER BIOPSY PERCUTANEOUS  02/21/2022    NEPHRECTOMY  2007    PERCUTANEOUS BIOPSY LIVER N/A 02/21/2022    Procedure: NEEDLE BIOPSY, LIVER, PERCUTANEOUS;  Surgeon: Trace Castellanos MD;  Location: [facility] OR       Review of Systems:   14 systems were reviewed and all negative except as mentioned above.   Current Medications:  Current Outpatient Medications   Medication    acetaminophen (TYLENOL) 500 MG tablet    acyclovir (ZOVIRAX) 800 MG tablet    Belumosudil Mesylate (REZUROCK) 200 MG TABS    Carboxymethylcell-Glycerin PF (REFRESH RELIEVA PF) 0.5-1 % SOLN    carboxymethylcellulose PF (CARBOXYMETHYLCELLULOSE SODIUM) 0.5 % ophthalmic solution    dexamethasone alcohol-free (DECADRON) 0.1 MG/ML solution    escitalopram (LEXAPRO) 10 MG tablet    fentaNYL (DURAGESIC) 25 mcg/hr 72 hr patch    fluorometholone (FML LIQUIFILM) 0.1 % ophthalmic suspension    levothyroxine (SYNTHROID/LEVOTHROID) 137 MCG tablet    LORazepam (ATIVAN) 1 MG tablet    methocarbamol (ROBAXIN) 750 MG tablet    naloxone (NARCAN) 4 MG/0.1ML nasal spray    nicotine polacrilex (NICORETTE) 4 MG gum    Omega-3 Fish Oil 500 MG capsule    omeprazole (PRILOSEC) 40 MG DR capsule    oxyCODONE (ROXICODONE) 5 MG tablet    polyethylene glycol (MIRALAX) 17 g packet    posaconazole (NOXAFIL) 100 MG EC tablet    predniSONE (DELTASONE) 1 MG tablet    predniSONE (DELTASONE) 5 MG tablet    rosuvastatin (CRESTOR) 5 MG tablet    sennosides (SENOKOT) 8.6 MG tablet    sodium chloride 0.9 % neb solution    UNABLE TO FIND    diclofenac (VOLTAREN) 1 % topical gel    erythromycin (ROMYCIN) 5 MG/GM ophthalmic ointment    fentaNYL (DURAGESIC) 25 mcg/hr 72 hr patch    fentaNYL 37.5 MCG/HR 72 hr patch    HYDROcodone-acetaminophen (NORCO) 5-325 MG tablet    hydrOXYzine (ATARAX) 10  "MG tablet    ondansetron (ZOFRAN ODT) 8 MG ODT tab    oxyCODONE-acetaminophen (PERCOCET) 5-325 MG tablet    zinc 50 MG TABS     No current facility-administered medications for this visit.       Physical Exam:   /78 (BP Location: Right arm, Patient Position: Sitting, Cuff Size: Adult Regular)   Pulse 75   Temp 97.8  F (36.6  C) (Oral)   Resp 20   Ht 1.854 m (6' 0.99\")   Wt 109.8 kg (242 lb 1.6 oz)   SpO2 97%   BMI 31.95 kg/m     Body mass index is 31.95 kg/m .    GENERAL APPEARANCE: Alert, not in acute distress; pleasant.   EYES:  Not pale conjunctiva, pupils equal  HENT: Mouth without ulcers or lesions  PULM: lungs clear to auscultation bilaterally, equal air movement, no clubbing  CV: regular rhythm, normal rate, no rub     -JVD no distended.      -edema: Only trace edema, Rt leg.   GI: soft,  - tender, no distended, bowel sounds are present  INTEGUMENT: + Recent healed large abrasion at Rt arm.   NEURO:  Non focal. No asterixis.     Labs:   All labs reviewed by me  Last Renal Panel:  Sodium   Date Value Ref Range Status   10/10/2023 143 135 - 145 mmol/L Final     Comment:     Reference intervals for this test were updated on 09/26/2023 to more accurately reflect our healthy population. There may be differences in the flagging of prior results with similar values performed with this method. Interpretation of those prior results can be made in the context of the updated reference intervals.    07/08/2021 139 133 - 144 mmol/L Final     Potassium   Date Value Ref Range Status   10/10/2023 3.5 3.4 - 5.3 mmol/L Final   11/15/2022 5.0 3.4 - 5.3 mmol/L Final   07/08/2021 3.9 3.4 - 5.3 mmol/L Final     Potassium POCT   Date Value Ref Range Status   12/01/2022 4.8 3.4 - 5.3 mmol/L Final     Chloride   Date Value Ref Range Status   10/10/2023 101 98 - 107 mmol/L Final   11/15/2022 101 94 - 109 mmol/L Final   07/08/2021 108 94 - 109 mmol/L Final     Carbon Dioxide   Date Value Ref Range Status   07/08/2021 23 20 " - 32 mmol/L Final     Carbon Dioxide (CO2)   Date Value Ref Range Status   10/10/2023 28 22 - 29 mmol/L Final   11/15/2022 30 20 - 32 mmol/L Final     Anion Gap   Date Value Ref Range Status   10/10/2023 14 7 - 15 mmol/L Final   11/15/2022 3 3 - 14 mmol/L Final   07/08/2021 7 3 - 14 mmol/L Final     Glucose   Date Value Ref Range Status   10/10/2023 110 (H) 70 - 99 mg/dL Final   11/15/2022 113 (H) 70 - 99 mg/dL Final   07/08/2021 107 (H) 70 - 99 mg/dL Final     GLUCOSE BY METER POCT   Date Value Ref Range Status   12/03/2022 116 (H) 70 - 99 mg/dL Final     Urea Nitrogen   Date Value Ref Range Status   10/10/2023 26.0 (H) 8.0 - 23.0 mg/dL Final   11/15/2022 51 (H) 7 - 30 mg/dL Final   07/08/2021 24 7 - 30 mg/dL Final     Creatinine   Date Value Ref Range Status   10/10/2023 1.56 (H) 0.67 - 1.17 mg/dL Final   07/08/2021 0.94 0.66 - 1.25 mg/dL Final     GFR Estimate   Date Value Ref Range Status   10/10/2023 49 (L) >60 mL/min/1.73m2 Final   07/08/2021 86 >60 mL/min/[1.73_m2] Final     Comment:     Non  GFR Calc  Starting 12/18/2018, serum creatinine based estimated GFR (eGFR) will be   calculated using the Chronic Kidney Disease Epidemiology Collaboration   (CKD-EPI) equation.       Calcium   Date Value Ref Range Status   10/10/2023 10.2 8.8 - 10.2 mg/dL Final   07/08/2021 8.9 8.5 - 10.1 mg/dL Final     Phosphorus   Date Value Ref Range Status   06/13/2023 3.1 2.5 - 4.5 mg/dL Final     Albumin   Date Value Ref Range Status   10/10/2023 4.1 3.5 - 5.2 g/dL Final   11/15/2022 3.6 3.4 - 5.0 g/dL Final   07/08/2021 3.7 3.4 - 5.0 g/dL Final       Imaging:  I reviewed imaging studies.     Assessment & Recommendations:   Problem list  # Mild NEGRA, stage 1 in 10/222 due to Tac then Sirolimus, resolved   # CKD stage 2 secondary to prior nephrectomy  # Prior AKIs  # Baseline Cr 0.9-1.0  # Swelling-> multi factorial-> likely steroid; improved  # Rt nephrectomy from RCC in 2007  # Ph+ALL s/p allo sib NMA (flu/cy+TBI)  PBSCT on  8/10/21  # cGVHD previously on TAC (off due to NEGRA), Sirolimus (Off due to tox) and now on Belumosudil and prednisone (slow tapering) since 10/18/22  # Hypertension, resolved  He had NEGRA in 10/22 likely from Tacrolimus toxicity in the setting of single kidney and Cr improved to 1.1 after discontinuation. However, he had worsening swelling and increased Cr to 1.39. At that time, it was concerned that he may have sirolimus toxicity. But he only had UCPR 0.39 g/g at that time and Sirolimus was stopped. I started him on low dose chlorthalidone and reduced dose amlodipine and his swelling improved but he developed hypotension so all blood pressure medications were discontinued.  His creatinine then came down to be stable at 1.1 with a EGFR in the 70s.  However, he recently developed acute kidney injury stage I likely secondary to vancomycin toxicity (mentioned below)  - Stay well hydrated  - Low salt diet; NA 2000 mg per day  # NEGRA stage 1 likely from vanco toxicity  Cr increased to peak at 1.87 (peaked at 10/12/23) from baseline of 1.-1.2. Vanco was stopped on 10/12/2023.  Labs on 10/16/2020 showed creatinine 1.68.  We will continue to monitor kidney function asked him to stay well-hydrated.  # L5/S1 discitis; rescurrent  Started on Ertapenem and Vanco since August 2023, plan for 6 weeks but Vanco stopped on 10/12/23 due to NEGRA now replaced with Clindamycin and will be on until 110/23/23.  # Hypertension  Blood pressure started to increase after he developed NEGRA.  It was as high as 180s sometimes.  Now after he resumed amlodipine and stop vancomycin blood pressure has improved.  I asked him to measure blood pressure daily a couple weeks.  # Mild hypercalcemia; resolved  Calcium was mildly elevated at 10.3 but now is normal.  The patient is taking calcium vitamin D 1 tabs per day. Vit D is 65 on 6/13/23. PTH is 60 on 10/18/22.     Follow-up in 11/14/23 with labs; previous appt    I spent  45 minutes on the date  of the encounter doing chart review, history and exam, documentation and further activities as noted above. 26 minutes of this visit is dedicated to direct patient interaction via face to face.     Keegan Chwe MD on 10/16/2023

## 2023-10-16 NOTE — LETTER
10/16/2023       RE: Nik Nava  [id number] [facility] [id number]     Dear Colleague,    Thank you for referring your patient, Nik Nava, to the [facility] [place] at [facility]. Please see a copy of my visit note below.      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      Oncologic history: The patient was diagnosed with Ph+ ALL in 2020 after presented with feeling unwell and found to have leukocytosis and blast in his blood. He was treated with Dex and Dasatinib then 2 cycles of vincristine, prednisone, daunorubicin, and pegasparaginase with intrathecal methotrexate. Last ly, he received 1 course of high dose Jaymie-C. He achieved CR with no MRD. Subsequently, he underwent allo sib NMA (flu/cy+TBI) PBSCT on  8/10/21. hematocrit-CI was 3.  The patient was noted to be in CR with BmBx at D100, D180 and 1Y showing 100% donor. He has not been started on TKI due to previous multiple active issues. Post Tx complication included cGVHD at skin, eyes, o liver (all Bx proven, except eyes, clinically) started since 2/22. The current active cGVHDD involved eyes and skin. He was on Tacrolimus for cGVHD but later discontinued due to NEGRA, hyperkalemia hypomagnesemia, tremors and cramps in 9/22. Sirolimus was started in 9/21/22 in replacement of Tac. Cr improved, but the presented on 10/11/22 with ocular and cGVHD flare, fluid retention and gain 5-6 kg. BMT thought this is sirolimus Tox? (of note UPCR was only 0.4 and normal SAlb). Sirolimus was then stopped. Prednisone was started at 40 mg with slow tapering. He was also started on Belumosudil on 10/18/22 (ROCK2  inhibitors:Rho-associated coiled-coil kinase )  Other pertinent Hx:   - He had PE in the setting of receiving PEG asparaginase and was treated with Xarelto which is now completed.   - He had hyperferritinemia which now being followed closely. Not on iron chelators.  - He had COVID -19 in 5/22.  - CMV viremia  - Grave disease  - HTN  Kidney history: He had RCC s/p Rt nephrectomy in 2007.  The patient has baseline creatinine of 0.9-1 pretransplant.  He developed NEGRA in 9/21 with Cr peaked at 1.89 but then come down to baseline. He had NEGRA again in 9/22 with Cr peaked at 1.8, suspect that due to tacrolimus toxicity and it was stopped. Cr improved but now still fluctuates between 1.1-1.3.  The most recent creatinine was on 10/31/2022 at 1.08.  Serum albumin is 3.3. UCPR is 0.39 g/g on 10/18/22. UA on 9/12/21 and 10/18/22 showed no protein, no blood.  However UA on 9/12/2021 showed 8 hyaline cast.      11/1/22: He is doing well except that he still has LE edema. It has improved from when he was on Sirolimus but still quite a bit. Edema has actually started since 2021 but unclear when.But it is certainly getting worse lately when he was treated with high dose steroid and sirolimus.His cGVHD is o/eva all stable but not completely gone. He was just started Belumosudil on 10/18/22 for cGVHD. He just saw BMT today and they are decreasing his steroid.Amlodipine started since 8/21 and possibly related to his swelling. He has multiple SEs from steroid such as weight gain , central obesity, easily bruised, increased appetite and partial insomnia. He has some nocturia. He has no dysuria or hematuria. Recently, he had cat scrath incidence and being treated with doxycycline. He has abdominal distension and gained weight progressively. He drinks 120 Oz of tea per day. We started chlorthalidone and reduced amlodipine.   12/1-12/3/22: Was admitted for dyspnea and malaise concerning for belumosudil intolerance. However, at the same time  he also had flu A positive. He was taken off of amlodipine on 12/6/22. And subsequently chlorthalidone was off in 12/13/22 when Na was 128. Na improved afterwards.   1/10/23:  He feels good today. His BP are now very good 110-130/70 mmHg. He is not on any BP medication ayt this time. He is taking Belumosidil. Symptoms of cGVHD have been improving.  He has some LE edema and easily bruised likely from steroid/belumosudil.   Labs: Creatinine 1.11, potassium 4.8,, dioxide 28, phosphorus 2.3, albumin 4.2, calcium 10.1.  Hemoglobin 11.3, platelet 140. UA pending.   6/13/23: In the interim, he went to the ED on 5/14/23 for testicular pain. CT showed no hernia or mass. Hew was also diagnosed with spine osteomyelitis and Epidural abscess and being treated with Ceftraixone for 6 weeks. Noted stable cGVHD. He also had COVID in 1/23. Now on physiologica dose of steroid. Still has dry eyes and a bit of mouth sore.  He drinks about 3-4 glasses of milk per day. He still has back pain that radiating downt to right leg. He completed ceftriaxone in 5/25/23. Appetite is stable as well as his weight. He has minimal swelling. He is on prednisone 4 mg alternate with 10 mg per day. Labs 6/13/23: Cr 1.10, BUN 46.2, K 5.1, Calcium 10.3, Phos 3.1, Albumin 4.0. Hb 11.7, Platelet 182, WBC 10.1.   10/16/23: In interim, he was diagnosed with L5-S1 discitis osteomyelitis at [facility] after presented with ongoing back pain.  MRI showing findings consistent with ongoing discitis-osteomyelitis at L5-S1 which has progressed since last imaging in June. He was seen by ID and neurosurgery. He underwent disc aspiration.  ID is recommending another 6 weeks course of antibiotics with a different regimen: Invanz and vancomycin. Neurosurgery did not recommend any surgical intervention at this time. Vanco dose was reduced on 10/10/23 due to kidney function and stopped last Thursday when Cr was 1.87. Lately, his blood pressure has been increasing to  150-160 and sometimes 180. I asked him to resume amlodipine 5 mg.  After stopping vancomycin, BP has improved along with his appetite. Cr has improved  to 1.68 on 10/16/23 ([facility] labs). He has gained some weight. His swelling was worse when he was admitted in the hospital. Now he is on Ertapenenm and clindamycin oral until 10/23. Pain is mostly when he lays too long and going to get up. He is still using fentanyl patch but the dose was reduced. Belumosidil was just on hold.  Labs on 10/10/2023 showed creatinine 1.56 but Cr 1.678 on 10/16/23 ([facility] lab), BUN 26, GFR 49, potassium 3.5,, dioxide 28, calcium 10.2, albumin 4.1, alkaline phosphatase 141.  CBC show white blood cell 13, hemoglobin 10.9, platelet 213. [place] 21.4 on 10/9/23. CRP 2.2 and ESR 46. UA on 10/12/23 showed no blood but UPCR is 0.2 g/g.     Past medical history  Past Medical History:   Diagnosis Date    ALL (acute lymphocytic leukemia) (H)     CKD (chronic kidney disease)     GVHD (graft versus host disease) (H)     H/O peripheral stem cell transplant (H)     Hyperthyroidism 1996    Infection due to 2019 novel coronavirus 01/16/2023    Influenza A 11/2022    Postablative hypothyroidism 1997    Pulmonary embolism (H)     Renal cell carcinoma (H) 2007    right kidney    Sleep apnea        Past surgical history  Past Surgical History:   Procedure Laterality Date    BACK SURGERY  2017    BONE MARROW BIOPSY, BONE SPECIMEN, NEEDLE/TROCAR Left 09/02/2021    Procedure: BIOPSY, BONE MARROW;  Surgeon: Jailyn Ny APRN CNP;  Location: [facility] OR    BONE MARROW BIOPSY, BONE SPECIMEN, NEEDLE/TROCAR Left 11/15/2021    Procedure: BIOPSY, BONE MARROW;  Surgeon: Socrates De La Torre;  Location: [facility] OR    BONE MARROW BIOPSY, BONE SPECIMEN, NEEDLE/TROCAR Left 02/07/2022    Procedure: BIOPSY, BONE MARROW;  Surgeon: Renetta Wiggins PA-C;  Location: [facility] OR    BONE MARROW BIOPSY, BONE SPECIMEN, NEEDLE/TROCAR Left 08/18/2022     Procedure: BIOPSY, BONE MARROW;  Surgeon: Lorrie Yap PA-C;  Location: [facility] OR    BONE MARROW BIOPSY, BONE SPECIMEN, NEEDLE/TROCAR Left 8/7/2023    Procedure: Bone marrow biopsy, bone specimen, needle/trocar;  Surgeon: Teressa Rick PA-C;  Location: [facility] OR    BRONCHOSCOPY (RIGID OR FLEXIBLE), DIAGNOSTIC N/A 04/29/2022    Procedure: BRONCHOSCOPY, WITH BRONCHOALVEOLAR LAVAGE;  Surgeon: Murtaza Garcia MD;  Location: [facility] GI    IR CVC TUNNEL PLACEMENT > 5 YRS OF AGE  08/04/2021    IR CVC TUNNEL REMOVAL LEFT  09/02/2021    IR LIVER BIOPSY PERCUTANEOUS  02/21/2022    NEPHRECTOMY  2007    PERCUTANEOUS BIOPSY LIVER N/A 02/21/2022    Procedure: NEEDLE BIOPSY, LIVER, PERCUTANEOUS;  Surgeon: Trace Castellanos MD;  Location: [facility] OR       Review of Systems:   14 systems were reviewed and all negative except as mentioned above.   Current Medications:  Current Outpatient Medications   Medication    acetaminophen (TYLENOL) 500 MG tablet    acyclovir (ZOVIRAX) 800 MG tablet    Belumosudil Mesylate (REZUROCK) 200 MG TABS    Carboxymethylcell-Glycerin PF (REFRESH RELIEVA PF) 0.5-1 % SOLN    carboxymethylcellulose PF (CARBOXYMETHYLCELLULOSE SODIUM) 0.5 % ophthalmic solution    dexamethasone alcohol-free (DECADRON) 0.1 MG/ML solution    escitalopram (LEXAPRO) 10 MG tablet    fentaNYL (DURAGESIC) 25 mcg/hr 72 hr patch    fluorometholone (FML LIQUIFILM) 0.1 % ophthalmic suspension    levothyroxine (SYNTHROID/LEVOTHROID) 137 MCG tablet    LORazepam (ATIVAN) 1 MG tablet    methocarbamol (ROBAXIN) 750 MG tablet    naloxone (NARCAN) 4 MG/0.1ML nasal spray    nicotine polacrilex (NICORETTE) 4 MG gum    Omega-3 Fish Oil 500 MG capsule    omeprazole (PRILOSEC) 40 MG DR capsule    oxyCODONE (ROXICODONE) 5 MG tablet    polyethylene glycol (MIRALAX) 17 g packet    posaconazole (NOXAFIL) 100 MG EC tablet    predniSONE (DELTASONE) 1 MG tablet    predniSONE (DELTASONE) 5 MG tablet    rosuvastatin (CRESTOR) 5 MG tablet    sennosides  "(SENOKOT) 8.6 MG tablet    sodium chloride 0.9 % neb solution    UNABLE TO FIND    diclofenac (VOLTAREN) 1 % topical gel    erythromycin (ROMYCIN) 5 MG/GM ophthalmic ointment    fentaNYL (DURAGESIC) 25 mcg/hr 72 hr patch    fentaNYL 37.5 MCG/HR 72 hr patch    HYDROcodone-acetaminophen (NORCO) 5-325 MG tablet    hydrOXYzine (ATARAX) 10 MG tablet    ondansetron (ZOFRAN ODT) 8 MG ODT tab    oxyCODONE-acetaminophen (PERCOCET) 5-325 MG tablet    zinc 50 MG TABS     No current facility-administered medications for this visit.       Physical Exam:   /78 (BP Location: Right arm, Patient Position: Sitting, Cuff Size: Adult Regular)   Pulse 75   Temp 97.8  F (36.6  C) (Oral)   Resp 20   Ht 1.854 m (6' 0.99\")   Wt 109.8 kg (242 lb 1.6 oz)   SpO2 97%   BMI 31.95 kg/m     Body mass index is 31.95 kg/m .    GENERAL APPEARANCE: Alert, not in acute distress; pleasant.   EYES:  Not pale conjunctiva, pupils equal  HENT: Mouth without ulcers or lesions  PULM: lungs clear to auscultation bilaterally, equal air movement, no clubbing  CV: regular rhythm, normal rate, no rub     -JVD no distended.      -edema: Only trace edema, Rt leg.   GI: soft,  - tender, no distended, bowel sounds are present  INTEGUMENT: + Recent healed large abrasion at Rt arm.   NEURO:  Non focal. No asterixis.     Labs:   All labs reviewed by me  Last Renal Panel:  Sodium   Date Value Ref Range Status   10/10/2023 143 135 - 145 mmol/L Final     Comment:     Reference intervals for this test were updated on 09/26/2023 to more accurately reflect our healthy population. There may be differences in the flagging of prior results with similar values performed with this method. Interpretation of those prior results can be made in the context of the updated reference intervals.    07/08/2021 139 133 - 144 mmol/L Final     Potassium   Date Value Ref Range Status   10/10/2023 3.5 3.4 - 5.3 mmol/L Final   11/15/2022 5.0 3.4 - 5.3 mmol/L Final   07/08/2021 3.9 " 3.4 - 5.3 mmol/L Final     Potassium POCT   Date Value Ref Range Status   12/01/2022 4.8 3.4 - 5.3 mmol/L Final     Chloride   Date Value Ref Range Status   10/10/2023 101 98 - 107 mmol/L Final   11/15/2022 101 94 - 109 mmol/L Final   07/08/2021 108 94 - 109 mmol/L Final     Carbon Dioxide   Date Value Ref Range Status   07/08/2021 23 20 - 32 mmol/L Final     Carbon Dioxide (CO2)   Date Value Ref Range Status   10/10/2023 28 22 - 29 mmol/L Final   11/15/2022 30 20 - 32 mmol/L Final     Anion Gap   Date Value Ref Range Status   10/10/2023 14 7 - 15 mmol/L Final   11/15/2022 3 3 - 14 mmol/L Final   07/08/2021 7 3 - 14 mmol/L Final     Glucose   Date Value Ref Range Status   10/10/2023 110 (H) 70 - 99 mg/dL Final   11/15/2022 113 (H) 70 - 99 mg/dL Final   07/08/2021 107 (H) 70 - 99 mg/dL Final     GLUCOSE BY METER POCT   Date Value Ref Range Status   12/03/2022 116 (H) 70 - 99 mg/dL Final     Urea Nitrogen   Date Value Ref Range Status   10/10/2023 26.0 (H) 8.0 - 23.0 mg/dL Final   11/15/2022 51 (H) 7 - 30 mg/dL Final   07/08/2021 24 7 - 30 mg/dL Final     Creatinine   Date Value Ref Range Status   10/10/2023 1.56 (H) 0.67 - 1.17 mg/dL Final   07/08/2021 0.94 0.66 - 1.25 mg/dL Final     GFR Estimate   Date Value Ref Range Status   10/10/2023 49 (L) >60 mL/min/1.73m2 Final   07/08/2021 86 >60 mL/min/[1.73_m2] Final     Comment:     Non  GFR Calc  Starting 12/18/2018, serum creatinine based estimated GFR (eGFR) will be   calculated using the Chronic Kidney Disease Epidemiology Collaboration   (CKD-EPI) equation.       Calcium   Date Value Ref Range Status   10/10/2023 10.2 8.8 - 10.2 mg/dL Final   07/08/2021 8.9 8.5 - 10.1 mg/dL Final     Phosphorus   Date Value Ref Range Status   06/13/2023 3.1 2.5 - 4.5 mg/dL Final     Albumin   Date Value Ref Range Status   10/10/2023 4.1 3.5 - 5.2 g/dL Final   11/15/2022 3.6 3.4 - 5.0 g/dL Final   07/08/2021 3.7 3.4 - 5.0 g/dL Final       Imaging:  I reviewed imaging  studies.     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 However, he had worsening swelling and increased Cr to 1.39. At that time, it was concerned that he may have sirolimus toxicity. But he only had UCPR 0.39 g/g at that time and Sirolimus was stopped. I started him on low dose chlorthalidone and reduced dose amlodipine and his swelling improved but he developed hypotension so all blood pressure medications were discontinued.  His creatinine then came down to be stable at 1.1 with a EGFR in the 70s.  However, he recently developed acute kidney injury stage I likely secondary to vancomycin toxicity (mentioned below)  - Stay well hydrated  - Low salt diet; NA 2000 mg per day  # NEGRA stage 1 likely from vanco toxicity  Cr increased to peak at 1.87 (peaked at 10/12/23) from baseline of 1.-1.2. Vanco was stopped on 10/12/2023.  Labs on 10/16/2020 showed creatinine 1.68.  We will continue to monitor kidney function asked him to stay well-hydrated.  # L5/S1 discitis; rescurrent  Started on Ertapenem and Vanco since August 2023, plan for 6 weeks but Vanco stopped on 10/12/23 due to NEGRA now replaced with Clindamycin and will be on until 110/23/23.  # Hypertension  Blood pressure started to increase after he developed NEGRA.  It was as high as 180s sometimes.  Now after he resumed amlodipine and stop vancomycin blood pressure has  improved.  I asked him to measure blood pressure daily a couple weeks.  # Mild hypercalcemia; resolved  Calcium was mildly elevated at 10.3 but now is normal.  The patient is taking calcium vitamin D 1 tabs per day. Vit D is 65 on 6/13/23. PTH is 60 on 10/18/22.     Follow-up in 11/14/23 with labs; previous appt    I spent  45 minutes on the date of the encounter doing chart review, history and exam, documentation and further activities as noted above. 26 minutes of this visit is dedicated to direct patient interaction via face to face.     Keegan Chew MD on 10/16/2023

## 2023-10-16 NOTE — NURSING NOTE
"Chief Complaint   Patient presents with    RECHECK     CKD     Vital signs:  Temp: 97.8  F (36.6  C) Temp src: Oral BP: 136/78 Pulse: 75   Resp: 20 SpO2: 97 %     Height: 185.4 cm (6' 0.99\") Weight: 109.8 kg (242 lb 1.6 oz)  Estimated body mass index is 31.95 kg/m  as calculated from the following:    Height as of this encounter: 1.854 m (6' 0.99\").    Weight as of this encounter: 109.8 kg (242 lb 1.6 oz).      Sara Cordero, [facility]  10/16/2023 2:25 PM    "

## 2023-10-17 DIAGNOSIS — I10 HYPERTENSION, ESSENTIAL: Primary | ICD-10-CM

## 2023-10-17 DIAGNOSIS — T86.09 GVHD AS COMPLICATION OF BONE MARROW TRANSPLANT (H): ICD-10-CM

## 2023-10-17 DIAGNOSIS — C91.01 ACUTE LYMPHOBLASTIC LEUKEMIA (ALL) IN REMISSION (H): ICD-10-CM

## 2023-10-17 DIAGNOSIS — D89.813 GVHD AS COMPLICATION OF BONE MARROW TRANSPLANT (H): ICD-10-CM

## 2023-10-17 RX ORDER — ACYCLOVIR 800 MG/1
800 TABLET ORAL 2 TIMES DAILY
Qty: 60 TABLET | Refills: 4 | Status: SHIPPED | OUTPATIENT
Start: 2023-10-17 | End: 2023-12-14

## 2023-10-17 RX ORDER — AMLODIPINE BESYLATE 5 MG/1
5 TABLET ORAL DAILY
Qty: 30 TABLET | Refills: 6 | Status: SHIPPED | OUTPATIENT
Start: 2023-10-17 | End: 2023-12-22

## 2023-10-17 RX ORDER — AZITHROMYCIN 250 MG/1
250 TABLET, FILM COATED ORAL DAILY
Qty: 30 TABLET | Refills: 3 | Status: SHIPPED | OUTPATIENT
Start: 2023-10-17 | End: 2023-11-14

## 2023-10-19 ENCOUNTER — VIRTUAL VISIT (OUTPATIENT)
Dept: PALLIATIVE MEDICINE | Facility: CLINIC | Age: 63
End: 2023-10-19
Payer: COMMERCIAL

## 2023-10-19 DIAGNOSIS — K59.03 THERAPEUTIC OPIOID-INDUCED CONSTIPATION (OIC): ICD-10-CM

## 2023-10-19 DIAGNOSIS — M54.50 LUMBAR SPINE PAIN: Primary | ICD-10-CM

## 2023-10-19 DIAGNOSIS — M54.10 RADICULAR PAIN OF RIGHT LOWER EXTREMITY: ICD-10-CM

## 2023-10-19 DIAGNOSIS — G06.1 ABSCESS IN EPIDURAL SPACE OF LUMBAR SPINE: ICD-10-CM

## 2023-10-19 DIAGNOSIS — T40.2X5A THERAPEUTIC OPIOID-INDUCED CONSTIPATION (OIC): ICD-10-CM

## 2023-10-19 DIAGNOSIS — M25.551 RIGHT HIP PAIN: ICD-10-CM

## 2023-10-19 PROCEDURE — 99214 OFFICE O/P EST MOD 30 MIN: CPT | Mod: VID

## 2023-10-19 RX ORDER — CLINDAMYCIN HCL 150 MG
600 CAPSULE ORAL
COMMUNITY
Start: 2023-10-13 | End: 2023-10-23

## 2023-10-19 RX ORDER — FENTANYL 12.5 UG/1
1 PATCH TRANSDERMAL
Qty: 5 PATCH | Refills: 0 | Status: SHIPPED | OUTPATIENT
Start: 2023-10-19 | End: 2023-11-14

## 2023-10-19 ASSESSMENT — PAIN SCALES - GENERAL: PAINLEVEL: NO PAIN (1)

## 2023-10-19 NOTE — PATIENT INSTRUCTIONS
Pain Physical Therapy:  NO   Continue activity as tolerated at home. May consider pain PT referral in future.      Pain Psychologist to address relaxation, behavioral change, coping style, and other factors important to improvement.  NO     Diagnostic Studies:  Reviewed lumbar MRI in chart - demonstrated multilevel degenerative changes, discitis, epidural space abscess and osteomyelitis.     Medication Management:  Fentanyl - Reduce to 12 mcg/hr patch every 72 hours x 12 days, then stop. Monitor for changes in pain levels and send Stir message update. Dosage was decreased in between visits to 25 mcg/hr, he reports cognitive effects have improved. Updated prescription for #5 patches sent in to pharmacy today.  Oxycodone - continue 5-10 mg twice daily as needed for breakthrough pain. Last dose 10/7/23, using Tylenol as needed.   Naloxone - Prescription sent into pharmacy at prior visit. He reports having this on hand at home.   Controlled substance agreement and UDS completed on 5/12/23.   Apply diclofenac gel to painful joints at least 2 x day, ideally 3-4 x day. This medication works best when used consistently.   Hydroxyzine 10 mg at bedtime as needed for pain and sleep. No refills today, added to med list, has leftover home supply.   Senna and Miralax - continue use for opioid induced constipation.      Potential procedures:   Deferred - He had lumbar injection through external neurosurgery clinic that was not helpful. No injection recommendations at this point, as he is on IV antibiotics for epidural abscess.      Other Orders/Referrals:   None     Follow up with PEE Fernandes CNP on 11/16/23 at 9:30 am via video visit    ----------------------------------------------------------------  Clinic Number:  [phone]   Call with any questions about your care and for scheduling assistance.   Calls are returned Monday through Friday between 8 AM and 4:30 PM. We usually get back to you within 2 business days  depending on the issue/request.    If we are prescribing your medications:  For opioid medication refills, call the clinic or send a [employer] message 7 days in advance.  Please include:  Name of requested medication  Name of the pharmacy.  For non-opioid medications, call your pharmacy directly to request a refill. Please allow 3-4 days to be processed.   Per [place] State Law:  All controlled substance prescriptions must be filled within 30 days of being written.    For those controlled substances allowing refills, pickup must occur within 30 days of last fill.      We believe regular attendance is key to your success in our program!    Any time you are unable to keep your appointment we ask that you call us at least 24 hours in advance to cancel.This will allow us to offer the appointment time to another patient.   Multiple missed appointments may lead to dismissal from the clinic.

## 2023-10-19 NOTE — PROGRESS NOTES
Nik is a 63 year old who is being evaluated via a billable video visit.      How would you like to obtain your AVS? MyChart  If the video visit is dropped, the invitation should be resent by: Text to cell phone: [phone]  Will anyone else be joining your video visit? Yes, his wife Lamar  Is Pt currently in [place]? Yes    Renate Streeter MA      NOTE:  If Pt is not in [place], Appointment needs to be canceled and rescheduled.

## 2023-10-19 NOTE — PROGRESS NOTES
Video-Visit Details    Type of service:  Video Visit     Originating Location (pt. Location): Home    Distant Location (provider location):  On-site  Platform used for Video Visit: [facility]     Date of visit: 10/19/2023      Assessment:   Nik Nava is a 63 year old male with a past medical history significant for Grave's disease, GVHD s/p bone marrow txp, generalized muscle weakness, ALL in remission, CKD, renal cell carcinoma,  who presents with complaints of low back and RLE pain.      Low back/RLE - Onset of pain occurred early March 2023 after fall on ice, had low back and tailbone pain. He then went to ED on 3/30/23 with worsening pain and was found to have epidural space abscess, discitis, osteomyelitis. He is currently following with ED for extended antibiotic course. Of note, he has hx of ALL (in remission), s/p BMT. Lumbar MRIs on 3/30/23 and 4/28/23, in addition to acute red flag findings, noted multilevel degenerative changes. He continues to have significant low back and RLE pain that extends down lateral aspect of leg into ankle. Denies foot paraesthesias, no leg weakness, no other red flag symptoms. On exam, mild TTP in lumbosacral area, focal tenderness of left lower lumbar, no red flag findings. Etiology of pain is likely multifactorial, including osteomyelitis, epidural abscess, underlying degenerative changes of spine, with overlying myofascial component to an extent.     Assigned to [place] nursing team.     Visit Diagnoses:  1. Lumbar spine pain    2. Abscess in epidural space of lumbar spine    3. Radicular pain of right lower extremity    4. Right hip pain    5. Therapeutic opioid-induced constipation (OIC)        Plan:  Diagnosis reviewed, treatment option addressed, and risk/benifits discussed.  Self-care instructions given.  I am recommending a multidisciplinary treatment plan to help this patient better manage their pain.      Pain Physical Therapy:   NO   Continue activity as tolerated at home. May consider pain PT referral in future.      Pain Psychologist to address relaxation, behavioral change, coping style, and other factors important to improvement.  NO     Diagnostic Studies:  Reviewed lumbar MRI in chart - demonstrated multilevel degenerative changes, discitis, epidural space abscess and osteomyelitis.     Medication Management:  Fentanyl - Reduce to 12 mcg/hr patch every 72 hours x 12 days, then stop. Monitor for changes in pain levels and send LivePerson message update. Dosage was decreased in between visits to 25 mcg/hr, he reports cognitive effects have improved. Updated prescription for #5 patches sent in to pharmacy today.  Oxycodone - continue 5-10 mg twice daily as needed for breakthrough pain. Last dose 10/7/23, using Tylenol as needed.   Naloxone - Prescription sent into pharmacy at prior visit. He reports having this on hand at home.   Controlled substance agreement and UDS completed on 5/12/23.   Apply diclofenac gel to painful joints at least 2 x day, ideally 3-4 x day. This medication works best when used consistently.   Hydroxyzine 10 mg at bedtime as needed for pain and sleep. No refills today, added to med list, has leftover home supply.   Senna and Miralax - continue use for opioid induced constipation.      Potential procedures:   Deferred - He had lumbar injection through external neurosurgery clinic that was not helpful. No injection recommendations at this point, as he is on IV antibiotics for epidural abscess.      Other Orders/Referrals:   None      Follow up with PEE Fernandes CNP on 11/16/23 at 9:30 am via video visit      Review of Electronic Chart: Today I have also reviewed available medical information in the patient's medical record at [facility] ([facility]) and Care Everywhere (if available), including relevant provider notes, laboratory work, and imaging.     Akilah Shah, DOMINIC, APRN, AGNP-C  [facility]     -------------------------------------------------    Subjective:    Chief complaint:   Chief Complaint   Patient presents with    Pain       Interval history:  Nik Nava is a 63 year old male last seen on 9/27/23.  They are a patient of mine seen in follow up.     Recommendations/plan at the last visit included:  Pain Physical Therapy:  NO   Continue activity as tolerated at home. May consider pain PT referral in future.      Pain Psychologist to address relaxation, behavioral change, coping style, and other factors important to improvement.  NO     Diagnostic Studies:  Reviewed lumbar MRI in chart - demonstrated multilevel degenerative changes, discitis, epidural space abscess and osteomyelitis.     Medication Management:  Fentanyl - Reduce to 37.5 mcg/hr patch every 72 hours. Dosage was increased by [facility] inpatient pain team to 50 mcg/hr patch, and I spoke with [facility] pain provider prior to hospital discharge 9/6/23. At last visit, I advised that we will plan to continue on a short term basis until IV antibiotics are completed (as of right now, estimated end date 10/9/23), then recommend he begin tapering or consider more ideal longer-term medications.  He reports over sedation during daytime. Advised that it is likely due to high dose opioids and recommend dose reduction. He and wife are in agreement with this. Updated prescription for #10 patches sent in to pharmacy today.  Oxycodone - continue 5-10 mg twice daily as needed for breakthrough pain. Advised to contact clinic if twice daily dosing insufficient with decreased fentanyl dosage BEFORE increasing dosage. If they do contact clinic in between visits, okay to advise TID PRN dosing. No refills provided today, advised to contact clinic if refill needed in between visits.   Home supply of opioids brought into clinic today. Advised to dispose of hydrocodone, percocet, oxycontin, and Belbuca in [facility]. Advised to  keep leftover fentanyl patches for now, as we may use for future titration/taper.   Naloxone - Prescription sent into pharmacy at prior visit. He reports having this on hand at home.   Controlled substance agreement and UDS completed on 5/12/23.   Apply diclofenac gel to painful joints at least 2 x day, ideally 3-4 x day. This medication works best when used consistently.   Hydroxyzine 10 mg at bedtime as needed for pain and sleep. No refills today, added to med list, has leftover home supply.   Advised at prior visit to contact clinic in between visits if struggling with pain control. We will need to consider neuropathic agents for better pain control and lieu of escalating opioid doses in the future if needed. Will consider gabapentin      Potential procedures:   Deferred - He had lumbar injection through external neurosurgery clinic that was not helpful. No injection recommendations at this point, as he is on IV antibiotics for epidural abscess.      Other Orders/Referrals:   None      Follow up with PEE Fernandes CNP on 10/19/23 at 9:30 am via video visit    Since his last visit, Nik Nava reports:  -he reports his pain level has gone considerably in the last week.   -He had reached out in between visits to reduce fentanyl patch to 25 mcg.   -He has not has taken oxycodone since 10/7/23.   -He is interested in reducing fentanyl patch even further.   -He continues to use tylenol PRN, has not needed as much.   -He had to stop Vanco in between visits due to renal function, switched to Clindo.   -He has repeat imaging coming up.   -Monday is last day of meropenem.   -He does still struggle with OIC but he is management with senna and miralax.       Pain Information:   Pain rating: averages 4/10 on a 0-10 scale.      Interval history from last visit on 9/27/23:  -He had a fall on 9/15, sustained skin tears.   -He is still on IV antibiotics.   -He is on fentanyl 50 mcg/hr patch, last use of oxycodone on  Saturday.   -He tried hydrocodone for breakthrough pain, but he only used once.   -He has #82 hydrocodone tabs left, per wife.   -He has #61 tabs of oxycodone.   -He also has misc opioids they will be disposing of today in the pharmacy.   -He continues to take miralax and senna for OIC, reports this is working well.   -He reports oversedation and sleeping a lot lately, even when out at activities like his granddaughter's volleyball game.   Pain Information:              Pain rating: averages 4/10 on a 0-10 scale.        Interval history from last visit on 9/14/23:  -his pain is about the same as it was at last visit.   -He was in hospital again due to pain from epidural abscess infection.   -He continues to struggle with pain overnight.   -Per wife we took tylenol and muscle relaxant overnight and this helped.   -He uses hydroxyzine PRN as well for sleep.   -Fentanyl patch was increased to 50 mcg/hr with recent hospital visit.   -He continues to take oxycodone, current dosage 10 mg BID for breakthrough pain, total 20 mg/day.  -He states he does not like side effects with oxycodone, wondering about different opioid.   -He is open to trial hydrocodone  -He may be interested medical cannabis to help reduce opioid usage, should pain persist beyond infection tx.   -He is using miralax to help with constipation.  Pain Information:              Pain rating: averages 5/10 on a 0-10 scale.        Interval history from last visit on 8/31/23:  -his pain is worse than it was at last visit.   -He was inpatient at [facility], epidural infection recurrence.   -He had tapered off Belbuca in between visits at the recommendation of neurosurgery.   -He started gabapentin 300 mg TID, but he did not appreciate analgesic benefit.   -He was started on fentanyl patch in hospital, gabapentin discontinued due to lack of benefit.   -Current fentanyl dosage 25 mcg/hr patch, increased and changed yesterday, so not at therapeutic level quite yet.    -He continues oxycodone for breal through pain.   -Surgery not recommended at this point.   -He took hydroxyzine for pain and sleep in the spring with prior injection.   -He is on IV antibiotics now at home.   -He had LESI with neurosurgery that was not helpful at all.   Pain Information:              Pain rating: averages 5/10 on a 0-10 scale.        Interval history from last visit on 7/10/23:  -He saw neurosurgeon since last visit, who advised him to taper off Belbuca and oxycodone.   -He has been working on reducing dosage on his own, down to Belbuca 150 mcg BID.   -He states he is having injection on Wednesday with neurosurgery.   -He is going to wait until after injection to decide how to proceed with surgery and possibly medications.   -His last dose of oxycodone was 4-5 days ago.   -He may be interested in resuming Belbuca, pending outcome from injection.   -He is not sure if he wants to pursue surgery.   -He reports surgeon recommended possibly gabapentin to help with nerve pain.   -He is interested in possibly exploring this, pending outcome from injection.   Pain Information:              Pain rating: averages 3/10 on a 0-10 scale.        Interval history from last visit on 6/15/23:  -his pain is about the same as it was at last visit.   -He notes he has been more active recently.   -Belbuca was increased at last visit to 300 mcg, has not appreciated improvement in pain.   -He notes his increased activity is impacting pain levels.   -He uses oxycodone 5 mg 1-2 x day PRN for breakthrough pain.   -He does not like to use oxycodone due to side effects.   -He has upcoming appointment with neurosurgery, states they will be doing another MRI.   -He states   -He has some mouth irritation from buccal film.   -No side effects   Pain Information:              Pain rating: averages 5/10 on a 0-10 scale.        Interval history from last visit on 5/24/23:  -his pain is about the same as it was at last visit.   -He  "was started on Belbuca 150 mcg BID at last visit.   -He was previously taking Oxycontin BID that caused marked sedation.   -He states things are \"okay\" since starting Belbuca, but he is having some oral irritation.   -He has hx of GVHD in his mouth, no open sores but he is concerned about this possibility.  -He has not appreciated significant benefit for pain with Belbuca 150 mcg BID.   -He has noticed improvement in mental clarity.  -No other side effects aside from mild oral irritation.   -He was given oxycodone 5 mg #20 tabs at last visit for breakthrough pain, reports he has only used 1 tablet of this prescription.   -He uses diclofenac gel on low back, right hip and RLE.   -He requests refill for diclofenac gel.   -He finishes long term abx tomorrow.   -He will be following up with Infectious Disease and neurosurgery soon.   -His UDS was positive for tramadol, which he was given prior. Denies tramadol use since last visit.   -We discussed [facility] drug drop boxes for old medications.   -His wife Lamar is present for the visit today.   Pain Information:              Pain rating: averages 2-3/10 on a 0-10 scale.           HPI from initial visit with me on 5/12/23:  Nik Nava is a 63 year old male presents with a chief complaint of low back and right hip pain, .      The pain has been present for 2+ months .    The pain is Extreme Pain (9) in severity.    The pain is described as throbbing in low back and thigh, \"zingers\" down the leg.   The pain is alleviated by meds (oxycontin), rest/lying down.    It is exacerbated by prolonged sitting and walking, driving.    Modalities that have been utilized in the past which were helpful include oxycodone while inpatient.    Things that were not helpful, but tried ,include tramadol while inpatient, .    The patient has never tried pain PT, injections (cannot do right now due to osteomyelitis).  The patient otherwise denies bowel or bladder incontinence, parasthesias, " weakness, saddle anesthesia, unintentional weight loss, or fever/chills/sweats.   Nik Nava has not been seen at a pain clinic in the past.  He had inpatient consult while hospitalized 3/30 ([facility])  -He had fall on ice 3/5/23, tailbone pain initially, then that has improved but continues to have low back and leg pain.   -He went to ED and admitted to hospital ([facility]) for osetomyelitis in the low back.   -He had pain team consult while inpatient, was started on oxycodone, tramadol, and hydroxyzine.   -Denies paraesthesias in feet.   -Denies weakness.   -He follows with Inf Dis, still on antibiotics.   -He finds MS contin helpful but he has significant sedation.   -His wife notes improvement in functioning after starting Oxycontin.   -He has home PT right now, chiropractor in the past.   -Oxycodone 5 mg at bedtime PRN prior to fall/infection  -Daughter Luis Miguel is present for visit, wife Lamar on the phone.            Current Pain Treatments:     Medications:                             Tylenol 1000 mg                  Fentanyl 25 mcg/hr patch q72h              Oxycodone 5 mg BID PRN               Naloxone - sent into pharmacy at prior visit     2. Other therapies:               Inf Disease - currently on antibiotic therapy               Home PT     Current MME: 0-15     Review of [place] Prescription Monitoring Program (): No concern for abuse or misuse of controlled medications based on this report. Reviewed - appears appropriate.      Annual Controlled Substance Agreement/UDS due date: Completed on 5/12/23     Past pain treatments:  Surgery   Belbuca 150 mcg BID       Medications:  Current Outpatient Medications   Medication Sig Dispense Refill    acetaminophen (TYLENOL) 500 MG tablet       acyclovir (ZOVIRAX) 800 MG tablet Take 1 tablet (800 mg) by mouth 2 times daily 60 tablet 4    amLODIPine (NORVASC) 5 MG tablet Take 1 tablet (5 mg) by mouth daily 30 tablet 6    azithromycin (ZITHROMAX) 250 MG tablet  Take 1 tablet (250 mg) by mouth daily 30 tablet 3    Belumosudil Mesylate (REZUROCK) 200 MG TABS Take 1 tablet by mouth daily 30 tablet 3    Carboxymethylcell-Glycerin PF (REFRESH RELIEVA PF) 0.5-1 % SOLN Apply 1 drop to eye 4 times daily Preservative free. Either PF multidose bottle or PF vials 30 each 11    carboxymethylcellulose PF (CARBOXYMETHYLCELLULOSE SODIUM) 0.5 % ophthalmic solution Place 1 drop into both eyes 4 times daily 70 each 11    clindamycin (CLEOCIN) 150 MG capsule Take 600 mg by mouth      dexamethasone alcohol-free (DECADRON) 0.1 MG/ML solution Swish and spit 20 mLs (2 mg) in mouth 4 times daily (Patient taking differently: Swish and spit 2 mg in mouth 4 times daily Only using twice daily) 1000 mL 3    escitalopram (LEXAPRO) 10 MG tablet Take 1 tablet (10 mg) by mouth daily 30 tablet 3    fentaNYL (DURAGESIC) 12 mcg/hr 72 hr patch Place 1 patch onto the skin every 72 hours remove old patch. 5 patch 0    fentaNYL (DURAGESIC) 25 mcg/hr 72 hr patch Place 1 patch onto the skin every 72 hours remove old patch.  Fill / start 10/12/2023 5 patch 0    fentaNYL (DURAGESIC) 25 mcg/hr 72 hr patch Place 1 patch onto the skin every 72 hours remove old patch. 10 patch 0    fluorometholone (FML LIQUIFILM) 0.1 % ophthalmic suspension Place 1 drop into both eyes daily 5 mL 3    levothyroxine (SYNTHROID/LEVOTHROID) 137 MCG tablet Take 1 tablet (137 mcg) by mouth daily 90 tablet 4    LORazepam (ATIVAN) 1 MG tablet Take 1 tablet (1 mg) by mouth nightly as needed for anxiety or sleep 30 tablet 3    methocarbamol (ROBAXIN) 750 MG tablet Take 750 mg by mouth      naloxone (NARCAN) 4 MG/0.1ML nasal spray Spray 1 spray (4 mg) into one nostril alternating nostrils as needed for opioid reversal every 2-3 minutes until assistance arrives 0.2 mL 0    nicotine polacrilex (NICORETTE) 4 MG gum Take 4 mg by mouth as needed for smoking cessation       Omega-3 Fish Oil 500 MG capsule Take 2 capsules (1,000 mg) by mouth daily 120  capsule 3    omeprazole (PRILOSEC) 40 MG DR capsule Take 1 capsule (40 mg) by mouth daily 30 capsule 3    oxyCODONE (ROXICODONE) 5 MG tablet Take 1-2 tablets (5-10 mg) by mouth 2 times daily as needed for pain (max 4 tabs/day) Fill 8/23/2023 start 8/24/2023. 120 tablet 0    polyethylene glycol (MIRALAX) 17 g packet Take 1 packet by mouth      posaconazole (NOXAFIL) 100 MG EC tablet Take 3 tablets (300 mg) by mouth every morning 90 tablet 3    predniSONE (DELTASONE) 1 MG tablet Take 4 tablets (4 mg) by mouth daily Take on even days 120 tablet 0    predniSONE (DELTASONE) 5 MG tablet Take 2 tablets (10 mg) by mouth every other day Take on odd days 60 tablet 3    rosuvastatin (CRESTOR) 5 MG tablet Take 1 tablet (5 mg) by mouth daily 30 tablet 3    sennosides (SENOKOT) 8.6 MG tablet Take 1 tablet by mouth 3 times daily as needed for constipation 90 tablet 0    sodium chloride 0.9 % neb solution 3 mLs by Other route 2 times daily 300 mL 11    UNABLE TO FIND Take 1,200 mg by mouth daily MEDICATION NAME: Flaxseed Oil      diclofenac (VOLTAREN) 1 % topical gel Apply 4 g topically 4 times daily (Patient not taking: Reported on 9/13/2023) 350 g 1    erythromycin (ROMYCIN) 5 MG/GM ophthalmic ointment Place 0.5 inches into both eyes At Bedtime (Patient not taking: Reported on 8/15/2023) 3.5 g 4    fentaNYL 37.5 MCG/HR 72 hr patch Place 1 patch onto the skin every 72 hours (Patient not taking: Reported on 10/16/2023) 10 patch 0    HYDROcodone-acetaminophen (NORCO) 5-325 MG tablet Take 1-2 tablets by mouth 3 times daily as needed for severe pain (Patient not taking: Reported on 9/27/2023) 84 tablet 0    hydrOXYzine (ATARAX) 10 MG tablet Take 10 mg at bedtime PRN for pain and sleep (Patient not taking: Reported on 9/27/2023)      ondansetron (ZOFRAN ODT) 8 MG ODT tab       oxyCODONE-acetaminophen (PERCOCET) 5-325 MG tablet Take 1 tablet by mouth daily      zinc 50 MG TABS Take 100 mg by mouth daily Take 100 mg daily for 6 weeks  (Patient not taking: Reported on 8/7/2023) 82 tablet 0       Medical History: any changes in medical history since they were last seen? No      Objective:    Physical Exam:  There were no vitals taken for this visit.  GENERAL: Healthy, alert and no distress  EYES: Eyes grossly normal to inspection.  No discharge or erythema, or obvious scleral/conjunctival abnormalities.  RESP: No audible wheeze, cough, or visible cyanosis.  No visible retractions or increased work of breathing.    SKIN: Visible skin clear. No significant rash, abnormal pigmentation or lesions.  NEURO: Cranial nerves grossly intact.  Mentation and speech appropriate for age.  PSYCH: Mentation appears normal, affect normal/bright, judgement and insight intact, normal speech and appearance well-groomed.    Diagnostic Tests/Imaging/Labs:  None     BILLING TIME DOCUMENTATION:   The total TIME spent on this patient on the date of the encounter/appointment was 29 minutes.      TOTAL TIME includes:   Time spent preparing to see the patient (reviewing records and tests)   Time spent face to face (or over the phone) with the patient   Time spent ordering tests, medications, procedures and referrals   Time spent Referring and communicating with other healthcare professionals   Time spent documenting clinical information in Epic

## 2023-10-20 DIAGNOSIS — C91.01 ACUTE LYMPHOBLASTIC LEUKEMIA (ALL) IN REMISSION (H): Primary | ICD-10-CM

## 2023-10-20 DIAGNOSIS — D89.813 GVHD AS COMPLICATION OF BONE MARROW TRANSPLANT (H): ICD-10-CM

## 2023-10-20 DIAGNOSIS — T86.09 GVHD AS COMPLICATION OF BONE MARROW TRANSPLANT (H): ICD-10-CM

## 2023-10-20 LAB
BCR/ABL1 KINASE DOMAIN MUT ANL BLD/T: NOT DETECTED
SPECIMEN SOURCE: NORMAL

## 2023-10-20 PROCEDURE — 94642 AEROSOL INHALATION TREATMENT: CPT | Performed by: INTERNAL MEDICINE

## 2023-10-20 PROCEDURE — 94640 AIRWAY INHALATION TREATMENT: CPT | Performed by: INTERNAL MEDICINE

## 2023-10-20 RX ORDER — PENTAMIDINE ISETHIONATE 300 MG/300MG
300 INHALANT RESPIRATORY (INHALATION)
Status: CANCELLED | OUTPATIENT
Start: 2023-10-20

## 2023-10-20 RX ORDER — PENTAMIDINE ISETHIONATE 300 MG/300MG
300 INHALANT RESPIRATORY (INHALATION)
Status: DISCONTINUED | OUTPATIENT
Start: 2023-10-20 | End: 2023-10-20 | Stop reason: HOSPADM

## 2023-10-20 RX ORDER — ALBUTEROL SULFATE 0.83 MG/ML
2.5 SOLUTION RESPIRATORY (INHALATION) EVERY 4 HOURS PRN
Status: CANCELLED | OUTPATIENT
Start: 2023-10-20

## 2023-10-20 RX ADMIN — PENTAMIDINE ISETHIONATE 300 MG: 300 INHALANT RESPIRATORY (INHALATION) at 09:12

## 2023-10-20 NOTE — PROGRESS NOTES
Nik Nava was seen today for a Pentamidine nebulizer tx ordered by Dr. Akshat Tobar.    Patient was first given 4 puffs of albuterol MDI with spacer (due to albuterol nebulizer shortage), after which Pentamidine 300 mg (Lot # [id number], NDC# [id number], EXP 04/2025) mixed with 6cc Sterile H20 was administered through a filtered nebulizer.    Pre-treatment: SpO2 = 98% HR = 68 bpm   BBS = clear   Post-treatment: SpO2 =99%  HR =65 bpm   BBS = clear    No adverse side effects noted by the patient.    This service today was provided under the supervision of Dr. Janeth Eaton, who was available if needed.     Procedure was completed by Ayan Clarke RRT.

## 2023-11-07 ENCOUNTER — TELEPHONE (OUTPATIENT)
Dept: OPTOMETRY | Facility: CLINIC | Age: 63
End: 2023-11-07

## 2023-11-07 NOTE — TELEPHONE ENCOUNTER
Spoke to pt at 1508    Pt feels cataracts worsening-- h/o steroid use.    Blurrier vision each eye for past several weeks.    H/o GVHD and no acute eye pain/discomfort    Of note per pt--Pt did stop anti-rejection medication.    Pt would like to schedule cataract consult, but before does feels Dr. Juarez had specific provider in mind.    Will review/confirm recommendation with Dr. Juarez and reach out to pt.    Chris Mireles RN 3:14 PM 11/07/23

## 2023-11-07 NOTE — TELEPHONE ENCOUNTER
[facility] Call Center    Phone Message    May a detailed message be left on voicemail: yes     Reason for Call: Symptoms or Concerns     If patient has red-flag symptoms, warm transfer to triage line    Current symptom or concern: Pt advises his vision is worsening pt has an appt on 11/30/23 and was advised to call if vision got worse. Pt is requesting to be seen sooner. Please contact pt to discuss options. Thank you!     Symptoms have been present for:  3-4 weeks    Has patient previously been seen for this? Yes    By {provider's name:Dr Juarez  Date: 5/23    Are there any new or worsening symptoms? Yes:     Action Taken: Message routed to:  [facility] ([facility] eye    Travel Screening: Not Applicable

## 2023-11-08 ENCOUNTER — MYC MEDICAL ADVICE (OUTPATIENT)
Dept: PALLIATIVE MEDICINE | Facility: CLINIC | Age: 63
End: 2023-11-08
Payer: MEDICARE

## 2023-11-08 ENCOUNTER — TELEPHONE (OUTPATIENT)
Dept: OPHTHALMOLOGY | Facility: CLINIC | Age: 63
End: 2023-11-08
Payer: MEDICARE

## 2023-11-08 NOTE — TELEPHONE ENCOUNTER
Spoke with patient regarding scheduling with Dr. Menezes for cataract surgery eval-Per . Scheduled patient accordingly and sent reminder letter and map to confirmed address. Added patient to the wait-list as well. -Per Patient

## 2023-11-09 NOTE — TELEPHONE ENCOUNTER
Chart reviewed - Since it has been more than 1 week since he discontinued fentanyl patch, I would not expect withdrawal symptoms at this point. Is he having significant pain? He could try increasing hydroxyzine to 2 tabs (20 mg) at bedtime to see if this helps with the restlessness.     Akilah Shah, DNP, APRN, AGNP-C  [facility]

## 2023-11-13 NOTE — PROGRESS NOTES
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    HPI:    Nik returns to clinic for follow up.  He is doing much better today and i is in much better spirits.  Pain is essentially almost resolved.  He is set for repeat imaging in the near future with close follow-up.  Continues to be on antimicrobials. In terms of chronic GVHD symptoms are better controlled overall and stable, using eyedrops 3-4 times a day only down from more than 10 times per day earlier in the summer, not using scleral lenses regularly but believes ocular lenses have made a significant difference in his quality of life and ocular symptoms.  No new oral sores or ulcers and he believes that dexamethasone has been helping manage and control.   No new respiratory symptoms.  No chest pain.  No nausea vomiting or diarrhea.  No bleeding and no headaches.    Review of Systems                                                                                                                                         10 point Review of systems was negative except as detailed above     PHYSICAL EXAM                                                                                                                                                   KPS: 70      Wt Readings from Last 4 Encounters:   10/16/23 109.8 kg (242 lb 1.6 oz)   10/10/23 111.7 kg (246 lb 3.2 oz)   09/26/23 110.7 kg (244 lb)   08/15/23 110.8 kg (244 lb 4.8 oz)      General: NAD.  HEENT: sclera anicteric, injected conjunctivae.  11/14/2023 No noted lichenoid changes in the oral mucosa previous prominent area of healed ulcers in the right/left mid buccal mucosa are less prominent with overall improved appearence and more normal appearing mucosal tissue, no ulcers seen.    Lungs:  CTA b/l  no wheezes  CV: RRR  no gallop  Abd; soft, NABS, protuberant  Ext/ Skin: Skin bruising on bilateral forearms,   fainting of previous hyperpigmented areas of previously known cGVHD  LE trace bilateral edema in lower extremities, left>right noted today; stable    cGVHD therapy started on February 24 2022  - Baseline NIH scoring 2/24/2022 : skin 2 maculopapular rash/erythema with no sclerotic features, mouth score 1 mild symptoms with lichenoid changes less than 25%, eyes score 2 moderate symptoms without new visual impairments due to KCS requiring lubricant eyedrops more than 3 times a day, overall mild scale for her provider  - 3/25/2022 1 month NIH treatment assessment on PQRST: skin 2, mouth score 1 mild symptoms with NO lichenoid changes, eyes score 2 moderate symptoms w requiring lubricant eyedrops more than 3 times a day, liver score 1 ALT 3.7x ULN and nl bilirubin   - 3/31  Mouth clear; eyes minimal LFT still abn; no joint or new GI sx  - 4/28 Mouth clear, Left>R eye is mild sclera erythema, lids are pink and irritated appearing, LFT's slow improvement, no new joint, skin, or GI symptoms.   -5/11 mouth with mild erythema but no lichenoid changes, eyes using eyedrops 4-6 times a day score of 2, LFTs significantly improved from baseline slight elevation in alk phos and AST with normal bilirubin, skin with hyperpigmentation from old acute GVHD no active skin rashes, no GI symptoms no musculoskeletal complaints.  -5/26 Mouth with very slight lichenoid changes, erythema.  Eyes still using eyedrops 4-6x per day.  LFTs essentially normal with slight elevation in alk phos.  Skin with hyperpigmentation from old acute GVHD, no active rashes, no GI or MSK concerns.  Skin 0, mouth 0 but with lichenoid changes, eyes 2  -6/7/22 Mouth with no lichenoid changes, erythema.  Eyes still using eyedrops 4-6x per day.  LFTs essentially normal with slight elevation in alk phos.  Skin with hyperpigmentation from old acute GVHD, no active rashes, no GI or MSK concerns.  Skin 0, mouth 0, eyes 2     -6 month PQRST assessment 9/6/2022: eyes 3,  mouth 0 for symptoms, 25% lichenoid changes (1), liver/GI/skin 0    11/8/2022, 11/22/2022, 12/13/22 cGVHD NIG scoring eyes 3, no other organ involvement clinically  3/28/23 cGVHD NIG scoring eyes 3, no other organ involvement clinically  5/2/23 cGVHD NIG scoring eyes 3, oral 1, no other organ involvement clinically  6/13/23 cGVHD NIG scoring eyes 3, oral 1, no other organ involvement clinically  8/15/23 cGVHD NIG scoring eyes 3 (down to 3-4 times eye drop from >10 but still using scleral lenses), oral 1, no other organ involvement clinically  10/10/2023 cGVHD NIG scoring eyes 2-3 (down to 3-4 times eye drop from >10 but still using scleral lenses), oral 1, no other organ involvement clinically  11/14/2023 cGVHD NIG scoring eyes 2-3 (down to 3-4 times eye drop from >10 but still using scleral lenses), oral 1, no other organ involvement clinically    Lab:    Lab Results   Component Value Date    WBC 13.0 (H) 10/10/2023    ANEU 3.5 10/26/2021    HGB 10.9 (L) 10/10/2023    HCT 32.6 (L) 10/10/2023     10/10/2023     10/10/2023    POTASSIUM 3.5 10/10/2023    CHLORIDE 101 10/10/2023    CO2 28 10/10/2023     (H) 10/10/2023    BUN 26.0 (H) 10/10/2023    CR 1.56 (H) 10/10/2023    MAG 1.9 10/10/2023    INR 0.90 12/01/2022    BILITOTAL 1.0 10/10/2023    AST 30 10/10/2023    ALT 26 10/10/2023    ALKPHOS 141 (H) 10/10/2023    PROTTOTAL 7.0 10/10/2023    ALBUMIN 4.1 10/10/2023     4/28/2023    MRI Lumbar spine  1.  The previously seen ventral epidural abscess has resolved with small amount of residual ventral epidural phlegmon.   2.  Marrow edema about the L5-S1 endplates has mildly worsened within new rim-enhancing subchondral lesion in the left S1 superior endplate. This could reflect progression of osteomyelitis.      MRI hip right  1.  No evidence of septic arthritis of the right hip joint.   2.  There is degeneration and likely tearing of the right acetabular labrum anterosuperiorly, and probably low-grade  chondromalacia of the right hip joint.   3.  Abnormality at L5-S1 compatible with known discitis-osteomyelitis.    ASSESSMENT AND PLAN   Nik Nava is a 63 year old male with Ph Pos ALL, day 825 s/p sib allo stem cell transplant    Day -6 (8/4): flu/cy  Day -5 through day -2 (8/5-8/8): flu  Day -1 (8/9): TBI  Day 0 (8/10): transplant     1.  Acute lymphoblastic leukemia, [place] chromosome positive in CR, MRD neg. S/p allo sib PBSCT. (ABO matched)  HCT-CI score: 3 (prior solid tumor)  Day +100 bone marrow biopsy is 100% donor with no morphologic or flow cytometric evidence of leukemia BCR abl is pending.  - Day +180 restaging BM bx- still in CR  100% donor;  2/16/22 BCR ABL major breakpoint undetectable.   - 1 year restaging 8/18/2022: Markedly hypocellular bone marrow [less than 10% cellular] with maturing trilineage hematopoiesis and no definite morphologic or immunophenotypic evidence for involvement by B lymphoblastic leukemia. BCRABL1 PCR not detected, bone marrow % donor. FISH NORMAL No evidence of BCR-ABL1 fusion  - Maintenance with TKI posttransplantation given his Ph positive ALL that have not been started previously presumed secondary to multiple active issues specifically infections and COVID.  Per Avila Tobar: will reconsider this patient will think about whether this is something he would like to consider he was previously on Dasatinib, we would start on a low dose and increase as tolerated.  This is on hold now pending active GVHD therapy, we discussed on this visit 10/18 in agreement with holding off.  - 2 year restaging 8/7/2023 BMB: - No morphologic or immunophenotypic evidence of recurrent/persistent B-lymphoblastic leukemia. Slightly hypocellular marrow (10-20% estimated cellularity, patchy and variable), with trilineage hematopoetiic maturation and less than 2% blasts. Peripheral blood showing slight normocytic normochromic anemia and slight thrombocytopenia. BM % donor. FISH  No evidence of BCR::ABL1 fusion /CG No recipient (male) cells  or cells with a t(9;22) or other clonal chromosomal abnormality were  detected among the 20 metaphases analyzed.      2.  HEME: CBC stable.   3/2023 iron low 28, iron saturation index 10%, transferrin 225, ferritin 1381 down trending (in retrospect that was the time of epidural abscess as well).  B12, folate normal.  High copper and zinc borderline low, 5/2023 started zinc.  Started iron replacement in 4/2023, recheck iron profile on follow up more consistent with AOCD, discontinued iron. Iron 11/14/23 WNL. Note ferritin elevation is in the setting of discitis/OM, will reassess.    3.  FEN/Renal: Creatinine 0.97, s/p nephrectormy, nephrology following     4.    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   10/10/2023 cGVHD NIG scoring eyes 2-3, oral 1, no other organ involvement clinically   11/14/2023 cGVHD NIG scoring eyes 2-3, oral 1, no other organ involvement clinically   #Skin GVHD- history of biopsy proven GVHD of the skin 8/30/2021; resolved.   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 WNL today   # Ocular GVHD: follows with ophthalmology. Maxitrol to lids, continue refreshing drops QID. Score 3, but down to 3-4 times eye drops from >10/day. Not needing Sl all the time.  Followed closely by Dr. Juarez with significant improvement with scleral lenses and eyedrops  # Oral GVHD: dex s/s <2-3 x.  Lichenoid changes have largely resolved with no open ulcers since 11/8/2022, except for improving lichenoid changes to the mid right/left buccal mucosa    Previous therapies  Tacrolimus-previously decided to stay on same dose, no checks of levels if clinically stable, and no need switch to sirolimus. (keep tac low as gvhd is quiet and viremia). 5/10 level 45 with starting of COVID therapy and D-D intractions (Paxlovid for COVID19, which has a drug interaction with tacrolimus-Ritonavir  increases serum levels of tacrolimus).   Tacrolimus level subtherapeutic, increase to 2.5 mg twice daily recently, 8/23/2022 instructed to change tacrolimus to 2 mg twice daily. 9/6 with mild NEGRA, hyperkalemia, hypomagnesemia, and reports some tremors and cramps, suspect tacrolimus to be high therefore empirically decreased to 1.5 twice daily, skip morning dose of tacrolimus and took 2 mg today after his afternoon labs. Decrease to 1mg BID on Saturday.  Sirolimus  9/21 despite fluids and a dose adjustment of tacrolimus patient seem to have persistence NORBERTO related NEGRA, therefore after discussion with the patient decision was made to transition to sirolimus that was started on 9/21, patient initially seem to tolerate this well with normalization of kidney function  10/11 presented with flares of ocular and oral GVHD, fluid retention and gain of 5-6 kg, lower extremity edema, abdominal distention, total protein to creatinine ratio elevated at 0.4, in addition to elevation in BUN and creatinine, HTN.  Picture most consistent with sirolimus related side effects.  Less likely that the patient will tolerate even a lower level of sirolimus.  Therefore the patient was instructed to stop sirolimus, last dose on 10/10. 10/14 level 3.5 appropriately trending down.     Corticosteroids  3/1-3/16: prednisone 100mg every day  3/17 75mg every day   3/31 75 alt with 50  4/6: 75 alt with 25mg every other day  4/12 pred tapered to 60 alternating with 25mg   4/15 In setting of viruses trying to reactivate,GVHD stable: Taper 60/15mg alternating.  4/20 decrease pred further to 50/10.    4/25 taper pred to 50/0 every other day as long as symptoms stable.   5/2 Pred decrease 40/0 alternating every other day.   5/13 decrease to 30/0  5/20 decrease to 20/0 then slowly by 5 mg weekly  6/7 decrease to 10/0  Went up to 15/0 with mild increased LFTs  8/23/2022 increased to 20/0, with persistence of oral ulcers and active ocular GVHD.  Discussed  with the patient the importance of stopping chewing of nicotine gums for prolonged hours as that would not help with the healing of the oral ulcers.  I discussed with the patient alternatives of nicotine patches that he will reconsider and let me know.  9/6 decreased to 15 alternating with 0  9/13 decreased to 10 alternating with 0  9/21 discontinued tacrolimus given persistent NEGRA and single kidney and start the patient on sirolimus without loading dose.  We will get a list of possible side effects and adverse events from the Pharm.D. see sirolimus section above.  10/11 GVHD flare. Sirolimus stopped. Resume prednisone 40 mg daily as of 10/12, no change with mildly worsening eye pain 10/14  Reduce pred to 35mg 10/25 and 25mg 11/1 11/8 20 mg   11/22 15 mg  12/13/22 10 mg (plan to taper to 5mg then slow taper 1 mg per month given long pred history)  2/23/23 lower from 5 mg to 4 mg for the next month  3/28/2023 - 5/2/2023- 8/15/2023 continue on 10/4 given adrenal insufficieny with recent am cortisol check and clinical symptoms on taper to lower dose of 4 mg daily    Belumosudil  Patient intolerant/with disease flares on tacrolimus and sirolimus see above.  Discussed with the patient the different options for therapy of chronic GVHD that are FDA approved.  Given the side effect profiles after discussing in detail and given the least nephrotoxicity and expected response, taking into account other metabolic effect as well.  We will proceed with prior authorization with belumosudil.  Patient was given material to review for possible expected adverse events and side effects with both Belumosodil and ruxolitinib.   --- Started on 10/18/2022 - skin/liver/oral GVHD inactive, and occular symptoms controlled/symptomatic improvement.   --- 10/10/2023 will hold belumosodil given recurrent infections ans significant improvement in symptoms with no end organ damage after discussions with him and his wife. Will optimize topical  treatment with scleral lenses, eyedrops ans dex s/sp int he interim to avoid flares. Will readdress need to restart systemic treatment pending infectious resolution and symptoms.      5.  ID:   # Recent history of Epidural abscess: Followed by Dr. Candelario ID, plan to be on IV ceftriaxone for at least 6 weeks course through 5/11/2023-to be determined on further follow-up.  See imaging with MRI follow-up as above.   #Recurrent discitis/OM still on treatment through October 2023, cx negative, managed with ID locally.  3/30/2023 blood culture with Staph epidermidis  3/31/2023 BONE, L5, FLUOROSCOPICALLY-GUIDED NEEDLE BIOPSY: Benign bone with trilineage hematopoiesis (normal) Negative for neoplasm Negative for acute osteomyelitis. DISC, L5-S1, ASPIRATION FOR CYTOLOGY: Acellular debris; no cellular material present for evaluation     #History of COVID x2 last 1/2023 and influenza    Treated with Paxlovid with persistent fatigue but no active respiratory symptoms  received his influenza annual vaccine as well as COVID boosters locally 11/16/2022     #History of pulmonary infiltrates:   4/20 CT chest with new RUL infiltrate. Highly immunocompromised. 4/22 Fungitell/asp GM were neg. BAL 4/29 NGTD, increased micafungin to 150mg IV daily-- f/u 6/1 Dr Garcia CT with mixed results, followed with Dr. Garcia August 2022 with resolution of pulmonary nodules and normalization of LFTs we will switch patient will switch patient to posaconazole prophylactic dose and monitor LFTs and any infectious concerns closely. Will continue till we determine durable discontinuation of IST given high risk history.      #History of CMV viremia:    - CMV viremia up to 1100. 4/15 started valcyte 900mg PO BID. 4/20 CMV <137, 5/10 ND, decreased to 450mg BID 4/30 - continue 4 weeks as long as CMV <137 till 5/28 + weekly CMV. Switched to acyclovir after completion of therapy 5/28/2022. recheck 3/1/23 ND     - PPx:   ACV, will start Shingrix vaccination series  and discontinue in 2 months.  Posaconazole (previously on micafungin with LFts abl, hx of pulmonary nodules needign treatment dose). 11/14/2023 discontinue and check CT in 2 months given history.  Pentamidine, last 10/20/2023. Will schedule next dose.    11/2023 Discontinue azithro 250mg daily (stopped levaquin due to possible tendonitis).  He is on Augmentin and doxycycline for discitis/osteomyelitis.  IgG1/2023 364, 4/2023 460      - EBV viremia: 4/20 CAP CT (w/ contrast): No adenopathy. S/p 4/22 and 5/4/22 rituximab 375mg/m2 5/10 ND x2, 6/7 ND, 8/18/2022 971, 3/28/22 843  S/p covid vaccine series 12/2021  S/p evusheld     - vaccinations: Previously deferred annual vaccines in the setting of GVHD and immunosuppression therapy. 11/14/2023 we will start vaccination series, including Shingrix, COVID-vaccine.     6. Endo:   - Hx of graves disease; On synthroid follows with endocrine  - HLD: 11/2022 cholesterol 355, triglycerides 153, -- 11/8 started rosuvastatin 5 mg daily, 11/22 CPK within normal, 5/2/2023 repeat lipid profile cholesterol down to 202, triglycerides 191, LDL now normal at 95.  We will continue same dose of rosuvastatin and add fish oil 1g BID (on hold). Repeated August with PCP     7. CV:   - Hypertension history   - TTE most recently 10/2022     8. GI:   -Omeprazole for heartburn  -LFTs as above, now normal. Off ursodiol     9. Psych:   - Situational anxiety - lexapro 10mg daily.   - Insomnia: worse on steroids. Ativan, trazadone, melatonin     10. MSK:   -History of left food drop, PT. Occasional muscle cramps discussed optimizing vitamin D levels and considering vitamin D, some of the symptomatology of muscle cramps are likely related to chronic GVHD.  No muscle cramps reported today.  -4/2023 new tearing of the right acetabular labrum anterosuperiorly referred to to orthopedic surgery.       11. HCM/Age appropriate cancer screening:  -Discussed with the patient importance of age-appropriate  cancer screening including colonoscopies, he is due for this.  -Discussed importance of at least once a year vitamin D level check, 8/18/2022 wnl  -Screening for secondary iron overload, see heme section above  -Yearly TSH 2022 wnl; yearly lipid panel - see GI section above  -9/6/2022 DEXA scan Based on BMD diagnosis is consistent with normal bone density based on WHO criteria Ref. 1   -PFTs 9/20/22, see above. 9/21/2023 PFTs The FVC, FEV1, FEV1/FVC ratio and MEH46-44% are within normal limits. FVC 99% (108), FEV1 91%, DLCO uncorrected 91%.  Repeat PFTs in 4 to 6 months.  -Metabolic other, 8/2022 testosterone within normal  -11/22/2022 discussed with the patient the challenges and the stresses of chronic illness and healthcare fatigue.  Patient had previously been on Lexapro that we restarted confirmed with pharmacy that there are no drug drug interactions.  Additionally we will referred patient to PMNR to help with physical rehab and strength to help with fatigue.  Our social workers will also reach out to Nik and his wife for further resources including support groups for patients with chronic illness and fatigue post transplant.  -Referral to palliative care for symptom and pain management including insomnia     Plan:  -Discontinue posaconazole and repeat CT chest in 2 months given his history  -Continue off belumosodil  -Decrease dexamethasone to 3 times a day from 4 times a day and monitor oral symptoms  Schedule pentamadine monthly and 2 months upon follow-up  -Initiate vaccination series today we will obtain boosters in 2 months  COVID-vaccine today  - follow with nephrology, endocrinology, PCP, follow-up locally with infectious disease, neurosurgery and pain management for management of osteomyelitis and pain control. PCP follow up for lipid profile, age appropriate cancer screening  - RTC 2 months or earlier as needed    I spent 45 minutes in the care of this patient today, which included time necessary  for preparation for the visit, obtaining history, ordering medications/tests/procedures as medically indicated, review of pertinent medical literature, counseling of the patient, communication of recommendations to the care team, and documentation time.

## 2023-11-14 ENCOUNTER — ONCOLOGY VISIT (OUTPATIENT)
Dept: TRANSPLANT | Facility: CLINIC | Age: 63
End: 2023-11-14
Attending: INTERNAL MEDICINE
Payer: COMMERCIAL

## 2023-11-14 ENCOUNTER — OFFICE VISIT (OUTPATIENT)
Dept: NEPHROLOGY | Facility: CLINIC | Age: 63
End: 2023-11-14
Attending: INTERNAL MEDICINE
Payer: COMMERCIAL

## 2023-11-14 ENCOUNTER — LAB (OUTPATIENT)
Dept: LAB | Facility: CLINIC | Age: 63
End: 2023-11-14
Attending: INTERNAL MEDICINE
Payer: COMMERCIAL

## 2023-11-14 VITALS
WEIGHT: 243.1 LBS | TEMPERATURE: 98.2 F | BODY MASS INDEX: 32.08 KG/M2 | DIASTOLIC BLOOD PRESSURE: 81 MMHG | HEART RATE: 61 BPM | SYSTOLIC BLOOD PRESSURE: 125 MMHG | OXYGEN SATURATION: 98 %

## 2023-11-14 VITALS
WEIGHT: 243 LBS | SYSTOLIC BLOOD PRESSURE: 125 MMHG | DIASTOLIC BLOOD PRESSURE: 81 MMHG | HEART RATE: 61 BPM | TEMPERATURE: 98.2 F | RESPIRATION RATE: 18 BRPM | BODY MASS INDEX: 32.07 KG/M2 | OXYGEN SATURATION: 98 %

## 2023-11-14 DIAGNOSIS — N17.9 AKI (ACUTE KIDNEY INJURY) (H): Primary | ICD-10-CM

## 2023-11-14 DIAGNOSIS — E89.0 POSTABLATIVE HYPOTHYROIDISM: ICD-10-CM

## 2023-11-14 DIAGNOSIS — G25.81 RESTLESS LEG SYNDROME: ICD-10-CM

## 2023-11-14 DIAGNOSIS — E89.0 POSTABLATIVE HYPOTHYROIDISM: Primary | ICD-10-CM

## 2023-11-14 DIAGNOSIS — N17.9 AKI (ACUTE KIDNEY INJURY) (H): ICD-10-CM

## 2023-11-14 DIAGNOSIS — Z94.81 STATUS POST BONE MARROW TRANSPLANT (H): ICD-10-CM

## 2023-11-14 DIAGNOSIS — Z86.39 HISTORY OF GRAVES' DISEASE: ICD-10-CM

## 2023-11-14 DIAGNOSIS — Z94.81 STATUS POST BONE MARROW TRANSPLANT (H): Primary | ICD-10-CM

## 2023-11-14 DIAGNOSIS — N18.31 ANEMIA IN STAGE 3A CHRONIC KIDNEY DISEASE (H): ICD-10-CM

## 2023-11-14 DIAGNOSIS — T86.09 GVHD AS COMPLICATION OF BONE MARROW TRANSPLANT (H): ICD-10-CM

## 2023-11-14 DIAGNOSIS — D63.1 ANEMIA IN STAGE 3A CHRONIC KIDNEY DISEASE (H): ICD-10-CM

## 2023-11-14 DIAGNOSIS — D89.813 GVHD AS COMPLICATION OF BONE MARROW TRANSPLANT (H): ICD-10-CM

## 2023-11-14 DIAGNOSIS — C91.01 ACUTE LYMPHOBLASTIC LEUKEMIA (ALL) IN REMISSION (H): Primary | ICD-10-CM

## 2023-11-14 LAB
ALBUMIN MFR UR ELPH: 65.8 MG/DL
ALBUMIN SERPL BCG-MCNC: 4.4 G/DL (ref 3.5–5.2)
ALBUMIN SERPL BCG-MCNC: 4.5 G/DL (ref 3.5–5.2)
ALBUMIN UR-MCNC: 50 MG/DL
ALP SERPL-CCNC: 142 U/L (ref 40–150)
ALT SERPL W P-5'-P-CCNC: 25 U/L (ref 0–70)
ANION GAP SERPL CALCULATED.3IONS-SCNC: 12 MMOL/L (ref 7–15)
ANION GAP SERPL CALCULATED.3IONS-SCNC: 13 MMOL/L (ref 7–15)
APPEARANCE UR: CLEAR
AST SERPL W P-5'-P-CCNC: 34 U/L (ref 0–45)
BASOPHILS # BLD AUTO: 0 10E3/UL (ref 0–0.2)
BASOPHILS NFR BLD AUTO: 0 %
BILIRUB SERPL-MCNC: 0.9 MG/DL
BILIRUB UR QL STRIP: NEGATIVE
BUN SERPL-MCNC: 46.7 MG/DL (ref 8–23)
BUN SERPL-MCNC: 46.9 MG/DL (ref 8–23)
CALCIUM SERPL-MCNC: 10.1 MG/DL (ref 8.8–10.2)
CALCIUM SERPL-MCNC: 10.3 MG/DL (ref 8.8–10.2)
CHLORIDE SERPL-SCNC: 103 MMOL/L (ref 98–107)
CHLORIDE SERPL-SCNC: 104 MMOL/L (ref 98–107)
COLOR UR AUTO: YELLOW
CREAT SERPL-MCNC: 1.4 MG/DL (ref 0.67–1.17)
CREAT SERPL-MCNC: 1.4 MG/DL (ref 0.67–1.17)
CREAT UR-MCNC: 138 MG/DL
CRP SERPL-MCNC: <3 MG/L
DEPRECATED HCO3 PLAS-SCNC: 24 MMOL/L (ref 22–29)
DEPRECATED HCO3 PLAS-SCNC: 25 MMOL/L (ref 22–29)
EGFRCR SERPLBLD CKD-EPI 2021: 56 ML/MIN/1.73M2
EGFRCR SERPLBLD CKD-EPI 2021: 56 ML/MIN/1.73M2
EOSINOPHIL # BLD AUTO: 0 10E3/UL (ref 0–0.7)
EOSINOPHIL NFR BLD AUTO: 0 %
ERYTHROCYTE [DISTWIDTH] IN BLOOD BY AUTOMATED COUNT: 19.2 % (ref 10–15)
ERYTHROCYTE [SEDIMENTATION RATE] IN BLOOD BY WESTERGREN METHOD: 27 MM/HR (ref 0–20)
FERRITIN SERPL-MCNC: 2686 NG/ML (ref 31–409)
GLUCOSE SERPL-MCNC: 103 MG/DL (ref 70–99)
GLUCOSE SERPL-MCNC: 103 MG/DL (ref 70–99)
GLUCOSE UR STRIP-MCNC: NEGATIVE MG/DL
HCT VFR BLD AUTO: 33.7 % (ref 40–53)
HGB BLD-MCNC: 11.6 G/DL (ref 13.3–17.7)
HGB UR QL STRIP: NEGATIVE
IMM GRANULOCYTES # BLD: 0.1 10E3/UL
IMM GRANULOCYTES NFR BLD: 1 %
IRON BINDING CAPACITY (ROCHE): NORMAL
IRON SATN MFR SERPL: NORMAL %
IRON SERPL-MCNC: 149 UG/DL (ref 61–157)
KETONES UR STRIP-MCNC: NEGATIVE MG/DL
LEUKOCYTE ESTERASE UR QL STRIP: NEGATIVE
LYMPHOCYTES # BLD AUTO: 3.4 10E3/UL (ref 0.8–5.3)
LYMPHOCYTES NFR BLD AUTO: 30 %
MAGNESIUM SERPL-MCNC: 2 MG/DL (ref 1.7–2.3)
MCH RBC QN AUTO: 33.6 PG (ref 26.5–33)
MCHC RBC AUTO-ENTMCNC: 34.4 G/DL (ref 31.5–36.5)
MCV RBC AUTO: 98 FL (ref 78–100)
MONOCYTES # BLD AUTO: 0.9 10E3/UL (ref 0–1.3)
MONOCYTES NFR BLD AUTO: 8 %
MUCOUS THREADS #/AREA URNS LPF: PRESENT /LPF
NEUTROPHILS # BLD AUTO: 7 10E3/UL (ref 1.6–8.3)
NEUTROPHILS NFR BLD AUTO: 61 %
NITRATE UR QL: NEGATIVE
NRBC # BLD AUTO: 0 10E3/UL
NRBC BLD AUTO-RTO: 0 /100
PH UR STRIP: 5.5 [PH] (ref 5–7)
PHOSPHATE SERPL-MCNC: 3.6 MG/DL (ref 2.5–4.5)
PLATELET # BLD AUTO: 202 10E3/UL (ref 150–450)
POTASSIUM SERPL-SCNC: 4.8 MMOL/L (ref 3.4–5.3)
POTASSIUM SERPL-SCNC: 4.8 MMOL/L (ref 3.4–5.3)
PROT SERPL-MCNC: 7.2 G/DL (ref 6.4–8.3)
PROT/CREAT 24H UR: 0.48 MG/MG CR (ref 0–0.2)
RBC # BLD AUTO: 3.45 10E6/UL (ref 4.4–5.9)
RBC URINE: <1 /HPF
SODIUM SERPL-SCNC: 140 MMOL/L (ref 135–145)
SODIUM SERPL-SCNC: 141 MMOL/L (ref 135–145)
SP GR UR STRIP: 1.02 (ref 1–1.03)
T4 FREE SERPL-MCNC: 1.83 NG/DL (ref 0.9–1.7)
TSH SERPL DL<=0.005 MIU/L-ACNC: 0.08 UIU/ML (ref 0.3–4.2)
UROBILINOGEN UR STRIP-MCNC: NORMAL MG/DL
WBC # BLD AUTO: 11.5 10E3/UL (ref 4–11)
WBC URINE: 2 /HPF

## 2023-11-14 PROCEDURE — 83550 IRON BINDING TEST: CPT | Performed by: INTERNAL MEDICINE

## 2023-11-14 PROCEDURE — 90670 PCV13 VACCINE IM: CPT | Performed by: INTERNAL MEDICINE

## 2023-11-14 PROCEDURE — 90472 IMMUNIZATION ADMIN EACH ADD: CPT | Performed by: INTERNAL MEDICINE

## 2023-11-14 PROCEDURE — 99215 OFFICE O/P EST HI 40 MIN: CPT | Performed by: INTERNAL MEDICINE

## 2023-11-14 PROCEDURE — 250N000021 HC RX MED A9270 GY (STAT IND- M) 250: Performed by: INTERNAL MEDICINE

## 2023-11-14 PROCEDURE — 82728 ASSAY OF FERRITIN: CPT | Performed by: INTERNAL MEDICINE

## 2023-11-14 PROCEDURE — 81001 URINALYSIS AUTO W/SCOPE: CPT | Performed by: PATHOLOGY

## 2023-11-14 PROCEDURE — 90648 HIB PRP-T VACCINE 4 DOSE IM: CPT | Performed by: INTERNAL MEDICINE

## 2023-11-14 PROCEDURE — 85652 RBC SED RATE AUTOMATED: CPT | Performed by: PATHOLOGY

## 2023-11-14 PROCEDURE — 250N000011 HC RX IP 250 OP 636: Mod: JZ | Performed by: INTERNAL MEDICINE

## 2023-11-14 PROCEDURE — 84075 ASSAY ALKALINE PHOSPHATASE: CPT | Performed by: PATHOLOGY

## 2023-11-14 PROCEDURE — 99000 SPECIMEN HANDLING OFFICE-LAB: CPT | Performed by: PATHOLOGY

## 2023-11-14 PROCEDURE — 91320 SARSCV2 VAC 30MCG TRS-SUC IM: CPT | Performed by: INTERNAL MEDICINE

## 2023-11-14 PROCEDURE — 86160 COMPLEMENT ANTIGEN: CPT | Performed by: INTERNAL MEDICINE

## 2023-11-14 PROCEDURE — 82247 BILIRUBIN TOTAL: CPT | Performed by: PATHOLOGY

## 2023-11-14 PROCEDURE — 90471 IMMUNIZATION ADMIN: CPT | Performed by: INTERNAL MEDICINE

## 2023-11-14 PROCEDURE — 36415 COLL VENOUS BLD VENIPUNCTURE: CPT | Performed by: PATHOLOGY

## 2023-11-14 PROCEDURE — 99213 OFFICE O/P EST LOW 20 MIN: CPT | Mod: 25,27 | Performed by: INTERNAL MEDICINE

## 2023-11-14 PROCEDURE — 99213 OFFICE O/P EST LOW 20 MIN: CPT | Mod: 25 | Performed by: INTERNAL MEDICINE

## 2023-11-14 PROCEDURE — 84156 ASSAY OF PROTEIN URINE: CPT | Performed by: INTERNAL MEDICINE

## 2023-11-14 PROCEDURE — 90750 HZV VACC RECOMBINANT IM: CPT | Performed by: INTERNAL MEDICINE

## 2023-11-14 PROCEDURE — 90480 ADMN SARSCOV2 VAC 1/ONLY CMP: CPT | Performed by: INTERNAL MEDICINE

## 2023-11-14 PROCEDURE — 84460 ALANINE AMINO (ALT) (SGPT): CPT | Performed by: PATHOLOGY

## 2023-11-14 PROCEDURE — 83735 ASSAY OF MAGNESIUM: CPT | Performed by: PATHOLOGY

## 2023-11-14 PROCEDURE — 84439 ASSAY OF FREE THYROXINE: CPT | Performed by: PATHOLOGY

## 2023-11-14 PROCEDURE — 84450 TRANSFERASE (AST) (SGOT): CPT | Performed by: PATHOLOGY

## 2023-11-14 PROCEDURE — 80069 RENAL FUNCTION PANEL: CPT | Performed by: PATHOLOGY

## 2023-11-14 PROCEDURE — 86140 C-REACTIVE PROTEIN: CPT | Performed by: PATHOLOGY

## 2023-11-14 PROCEDURE — G0009 ADMIN PNEUMOCOCCAL VACCINE: HCPCS | Performed by: INTERNAL MEDICINE

## 2023-11-14 PROCEDURE — 85025 COMPLETE CBC W/AUTO DIFF WBC: CPT | Performed by: PATHOLOGY

## 2023-11-14 PROCEDURE — 90723 DTAP-HEP B-IPV VACCINE IM: CPT | Performed by: INTERNAL MEDICINE

## 2023-11-14 PROCEDURE — 84443 ASSAY THYROID STIM HORMONE: CPT | Performed by: PATHOLOGY

## 2023-11-14 PROCEDURE — 84155 ASSAY OF PROTEIN SERUM: CPT | Performed by: PATHOLOGY

## 2023-11-14 RX ORDER — DOXYCYCLINE HYCLATE 100 MG/1
TABLET, DELAYED RELEASE ORAL 2 TIMES DAILY PRN
COMMUNITY
End: 2023-12-14

## 2023-11-14 RX ORDER — LORAZEPAM 1 MG/1
2 TABLET ORAL
Qty: 30 TABLET | Refills: 3 | Status: SHIPPED | OUTPATIENT
Start: 2023-11-14 | End: 2024-05-09

## 2023-11-14 RX ORDER — LEVOTHYROXINE SODIUM 125 UG/1
125 TABLET ORAL DAILY
Qty: 90 TABLET | Refills: 4 | Status: SHIPPED | OUTPATIENT
Start: 2023-11-14 | End: 2024-02-22 | Stop reason: DRUGHIGH

## 2023-11-14 RX ORDER — HEPARIN SODIUM (PORCINE) LOCK FLUSH IV SOLN 100 UNIT/ML 100 UNIT/ML
5 SOLUTION INTRAVENOUS DAILY PRN
Status: DISCONTINUED | OUTPATIENT
Start: 2023-11-14 | End: 2023-11-14 | Stop reason: HOSPADM

## 2023-11-14 RX ADMIN — HAEMOPHILUS B POLYSACCHARIDE CONJUGATE VACCINE FOR INJ 0.5 ML: RECON SOLN at 16:08

## 2023-11-14 RX ADMIN — COVID-19 VACCINE, MRNA 30 MCG: 0.05 INJECTION, SUSPENSION INTRAMUSCULAR at 16:07

## 2023-11-14 RX ADMIN — ZOSTER VACCINE RECOMBINANT, ADJUVANTED 0.5 ML: KIT at 16:07

## 2023-11-14 RX ADMIN — PNEUMOCOCCAL 13-VALENT CONJUGATE VACCINE 0.5 ML: 2.2; 2.2; 2.2; 2.2; 2.2; 4.4; 2.2; 2.2; 2.2; 2.2; 2.2; 2.2; 2.2 INJECTION, SUSPENSION INTRAMUSCULAR at 16:09

## 2023-11-14 RX ADMIN — Medication 5 ML: at 15:58

## 2023-11-14 RX ADMIN — DIPHTHERIA AND TETANUS TOXOIDS AND ACELLULAR PERTUSSIS ADSORBED, HEPATITIS B (RECOMBINANT) AND INACTIVATED POLIOVIRUS VACCINE COMBINED 0.5 ML: 25; 10; 25; 25; 8; 10; 40; 8; 32 INJECTION, SUSPENSION INTRAMUSCULAR at 16:10

## 2023-11-14 ASSESSMENT — PAIN SCALES - GENERAL
PAINLEVEL: NO PAIN (0)
PAINLEVEL: NO PAIN (0)

## 2023-11-14 NOTE — PROGRESS NOTES
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      Oncologic history: The patient was diagnosed with Ph+ ALL in 2020 after presented with feeling unwell and found to have leukocytosis and blast in his blood. He was treated with Dex and Dasatinib then 2 cycles of vincristine, prednisone, daunorubicin, and pegasparaginase with intrathecal methotrexate. Last ly, he received 1 course of high dose Jaymie-C. He achieved CR with no MRD. Subsequently, he underwent allo sib NMA (flu/cy+TBI) PBSCT on  8/10/21. hematocrit-CI was 3.  The patient was noted to be in CR with BmBx at D100, D180 and 1Y showing 100% donor. He has not been started on TKI due to previous multiple active issues. Post Tx complication included cGVHD at skin, eyes, o liver (all Bx proven, except eyes, clinically) started since 2/22. The current active cGVHDD involved eyes and skin. He was on Tacrolimus for cGVHD but later discontinued due to NEGRA, hyperkalemia hypomagnesemia, tremors and cramps in 9/22. Sirolimus was started in 9/21/22 in replacement of Tac. Cr improved, but the presented on 10/11/22 with ocular and cGVHD flare, fluid retention and gain 5-6 kg. BMT thought this is sirolimus Tox? (of note UPCR was only 0.4 and normal SAlb). Sirolimus was then stopped. Prednisone was started at 40 mg with slow tapering. He was also started on Belumosudil on 10/18/22 (ROCK2 inhibitors:Rho-associated coiled-coil kinase )  Other pertinent Hx:   - He had PE in the setting of receiving PEG asparaginase and was treated with Xarelto which is now completed.   - He had hyperferritinemia which now being followed closely. Not on iron chelators.  - He had COVID -19 in 5/22.  - CMV viremia  - Grave  disease  - HTN  Kidney history: He had RCC s/p Rt nephrectomy in 2007.  The patient has baseline creatinine of 0.9-1 pretransplant.  He developed NEGRA in 9/21 with Cr peaked at 1.89 but then come down to baseline. He had NEGRA again in 9/22 with Cr peaked at 1.8, suspect that due to tacrolimus toxicity and it was stopped. Cr improved but now still fluctuates between 1.1-1.3.  The most recent creatinine was on 10/31/2022 at 1.08.  Serum albumin is 3.3. UCPR is 0.39 g/g on 10/18/22. UA on 9/12/21 and 10/18/22 showed no protein, no blood.  However UA on 9/12/2021 showed 8 hyaline cast.      11/1/22: He is doing well except that he still has LE edema. It has improved from when he was on Sirolimus but still quite a bit. Edema has actually started since 2021 but unclear when.But it is certainly getting worse lately when he was treated with high dose steroid and sirolimus.His cGVHD is o/eva all stable but not completely gone. He was just started Belumosudil on 10/18/22 for cGVHD. He just saw BMT today and they are decreasing his steroid.Amlodipine started since 8/21 and possibly related to his swelling. He has multiple SEs from steroid such as weight gain , central obesity, easily bruised, increased appetite and partial insomnia. He has some nocturia. He has no dysuria or hematuria. Recently, he had cat scrath incidence and being treated with doxycycline. He has abdominal distension and gained weight progressively. He drinks 120 Oz of tea per day. We started chlorthalidone and reduced amlodipine.   12/1-12/3/22: Was admitted for dyspnea and malaise concerning for belumosudil intolerance. However, at the same time he also had flu A positive. He was taken off of amlodipine on 12/6/22. And subsequently chlorthalidone was off in 12/13/22 when Na was 128. Na improved afterwards.   1/10/23:  He feels good today. His BP are now very good 110-130/70 mmHg. He is not on any BP medication ayt this time. He is taking Belumosidil.  Symptoms of cGVHD have been improving.  He has some LE edema and easily bruised likely from steroid/belumosudil.   Labs: Creatinine 1.11, potassium 4.8,, dioxide 28, phosphorus 2.3, albumin 4.2, calcium 10.1.  Hemoglobin 11.3, platelet 140. UA pending.   6/13/23: In the interim, he went to the ED on 5/14/23 for testicular pain. CT showed no hernia or mass. Hew was also diagnosed with spine osteomyelitis and Epidural abscess and being treated with Ceftraixone for 6 weeks. Noted stable cGVHD. He also had COVID in 1/23. Now on physiologica dose of steroid. Still has dry eyes and a bit of mouth sore.  He drinks about 3-4 glasses of milk per day. He still has back pain that radiating downt to right leg. He completed ceftriaxone in 5/25/23. Appetite is stable as well as his weight. He has minimal swelling. He is on prednisone 4 mg alternate with 10 mg per day. Labs 6/13/23: Cr 1.10, BUN 46.2, K 5.1, Calcium 10.3, Phos 3.1, Albumin 4.0. Hb 11.7, Platelet 182, WBC 10.1.   10/16/23: In interim, he was diagnosed with L5-S1 discitis osteomyelitis at [facility] after presented with ongoing back pain.  MRI showing findings consistent with ongoing discitis-osteomyelitis at L5-S1 which has progressed since last imaging in June. He was seen by ID and neurosurgery. He underwent disc aspiration.  ID is recommending another 6 weeks course of antibiotics with a different regimen: Invanz and vancomycin. Neurosurgery did not recommend any surgical intervention at this time. Vanco dose was reduced on 10/10/23 due to kidney function and stopped last Thursday when Cr was 1.87. Lately, his blood pressure has been increasing to 150-160 and sometimes 180. I asked him to resume amlodipine 5 mg.  After stopping vancomycin, BP has improved along with his appetite. Cr has improved  to 1.68 on 10/16/23 ([facility] labs). He has gained some weight. His swelling was worse when he was admitted in the hospital. Now he is on Ertapenenm and clindamycin  oral until 10/23. Pain is mostly when he lays too long and going to get up. He is still using fentanyl patch but the dose was reduced. Belumosidil was just on hold.  Labs on 10/10/2023 showed creatinine 1.56 but Cr 1.678 on 10/16/23 ([facility] lab), BUN 26, GFR 49, potassium 3.5,, dioxide 28, calcium 10.2, albumin 4.1, alkaline phosphatase 141.  CBC show white blood cell 13, hemoglobin 10.9, platelet 213. Vancomycin at [facility] was 21.4 on 10/9/23. CRP 2.2 and ESR 46. UA on 10/12/23 showed no blood but UPCR is 0.2 g/g.   11/14/23: He has been on Augmentin and Doxycyline plan until end of this month . Clindamycin upsets his currently due to main complaint is low energy and restless leg syndrome that started about 2 weeks ago.  He recently stopped taking magnesium and iron a month ago.  Stomach. He has minor swelling. He is off of fentanyl 2 weeks ago. BP has been in the 120-130/80s. His Cr has improved and from [facility] lab, Cr 1.26 on 11/6/23. He has been eating and drinking well. He is on prednisone. He started to have restless leg.  Labs today show WBC 11.5, Hb 11.6.  Creatinine 1.4, EGFR 56, potassium 4.8, bicarb 24.  calcium 10.1 albumin 4.4.  CRP less than 3, sed rate 27.  UA showed protein 50 mg/dL.  Platelets of 2, RBC less than 1.UPCR pending.     Past medical history  Past Medical History:   Diagnosis Date    ALL (acute lymphocytic leukemia) (H)     CKD (chronic kidney disease)     GVHD (graft versus host disease) (H)     H/O peripheral stem cell transplant (H)     Hyperthyroidism 1996    Infection due to 2019 novel coronavirus 01/16/2023    Influenza A 11/2022    Postablative hypothyroidism 1997    Pulmonary embolism (H)     Renal cell carcinoma (H) 2007    right kidney    Sleep apnea        Past surgical history  Past Surgical History:   Procedure Laterality Date    BACK SURGERY  2017    BONE MARROW BIOPSY, BONE SPECIMEN, NEEDLE/TROCAR Left 09/02/2021    Procedure: BIOPSY, BONE MARROW;  Surgeon: Yanely  Jailyn Bay, PEE CNP;  Location: [facility] OR    BONE MARROW BIOPSY, BONE SPECIMEN, NEEDLE/TROCAR Left 11/15/2021    Procedure: BIOPSY, BONE MARROW;  Surgeon: Socrates De La Torre;  Location: [facility] OR    BONE MARROW BIOPSY, BONE SPECIMEN, NEEDLE/TROCAR Left 02/07/2022    Procedure: BIOPSY, BONE MARROW;  Surgeon: Renetta Wiggins PA-C;  Location: [facility] OR    BONE MARROW BIOPSY, BONE SPECIMEN, NEEDLE/TROCAR Left 08/18/2022    Procedure: BIOPSY, BONE MARROW;  Surgeon: Lorrie Yap PA-C;  Location: [facility] OR    BONE MARROW BIOPSY, BONE SPECIMEN, NEEDLE/TROCAR Left 8/7/2023    Procedure: Bone marrow biopsy, bone specimen, needle/trocar;  Surgeon: Teressa Rick PA-C;  Location: [facility] OR    BRONCHOSCOPY (RIGID OR FLEXIBLE), DIAGNOSTIC N/A 04/29/2022    Procedure: BRONCHOSCOPY, WITH BRONCHOALVEOLAR LAVAGE;  Surgeon: Murtaza Garcia MD;  Location: [facility] GI    IR CVC TUNNEL PLACEMENT > 5 YRS OF AGE  08/04/2021    IR CVC TUNNEL REMOVAL LEFT  09/02/2021    IR LIVER BIOPSY PERCUTANEOUS  02/21/2022    NEPHRECTOMY  2007    PERCUTANEOUS BIOPSY LIVER N/A 02/21/2022    Procedure: NEEDLE BIOPSY, LIVER, PERCUTANEOUS;  Surgeon: Trace Castellanos MD;  Location: [facility] OR       Review of Systems:   14 systems were reviewed and all negative except as mentioned above.   Current Medications:  Current Outpatient Medications   Medication    acetaminophen (TYLENOL) 500 MG tablet    acyclovir (ZOVIRAX) 800 MG tablet    amLODIPine (NORVASC) 5 MG tablet    amoxicillin-clavulanate (AUGMENTIN) 875-125 MG tablet    Carboxymethylcell-Glycerin PF (REFRESH RELIEVA PF) 0.5-1 % SOLN    dexamethasone alcohol-free (DECADRON) 0.1 MG/ML solution    Doxycycline Hyclate 100 MG TBEC    escitalopram (LEXAPRO) 10 MG tablet    fluorometholone (FML LIQUIFILM) 0.1 % ophthalmic suspension    hydrOXYzine (ATARAX) 10 MG tablet    levothyroxine (SYNTHROID/LEVOTHROID) 137 MCG tablet    methocarbamol (ROBAXIN) 750 MG tablet    nicotine polacrilex (NICORETTE) 4 MG gum     omeprazole (PRILOSEC) 40 MG DR capsule    oxyCODONE (ROXICODONE) 5 MG tablet    predniSONE (DELTASONE) 1 MG tablet    predniSONE (DELTASONE) 5 MG tablet    rosuvastatin (CRESTOR) 5 MG tablet    sennosides (SENOKOT) 8.6 MG tablet    sodium chloride 0.9 % neb solution    LORazepam (ATIVAN) 1 MG tablet    Omega-3 Fish Oil 500 MG capsule     No current facility-administered medications for this visit.     Facility-Administered Medications Ordered in Other Visits   Medication    heparin 100 unit/mL injection 5 mL       Physical Exam:   /81 (BP Location: Right arm, Patient Position: Sitting, Cuff Size: Adult Large)   Pulse 61   Temp 98.2  F (36.8  C) (Oral)   Wt 110.3 kg (243 lb 1.6 oz)   SpO2 98%   BMI 32.08 kg/m     Body mass index is 32.08 kg/m .    GENERAL APPEARANCE: Alert, not in acute distress; pleasant.   EYES:  Not pale conjunctiva, pupils equal  HENT: Mouth without ulcers or lesions  PULM: lungs clear to auscultation bilaterally, equal air movement, no clubbing  CV: regular rhythm, normal rate, no rub     -JVD no distended.      -edema: NO edema.   GI: soft,  - tender, no distended, bowel sounds are present  INTEGUMENT: No rashes.   NEURO:  Non focal. No asterixis.     Labs:   All labs reviewed by me  Last Renal Panel:  Sodium   Date Value Ref Range Status   11/14/2023 140 135 - 145 mmol/L Final     Comment:     Reference intervals for this test were updated on 09/26/2023 to more accurately reflect our healthy population. There may be differences in the flagging of prior results with similar values performed with this method. Interpretation of those prior results can be made in the context of the updated reference intervals.    07/08/2021 139 133 - 144 mmol/L Final     Potassium   Date Value Ref Range Status   11/14/2023 4.8 3.4 - 5.3 mmol/L Final   11/15/2022 5.0 3.4 - 5.3 mmol/L Final   07/08/2021 3.9 3.4 - 5.3 mmol/L Final     Potassium POCT   Date Value Ref Range Status   12/01/2022 4.8 3.4 -  5.3 mmol/L Final     Chloride   Date Value Ref Range Status   11/14/2023 103 98 - 107 mmol/L Final   11/15/2022 101 94 - 109 mmol/L Final   07/08/2021 108 94 - 109 mmol/L Final     Carbon Dioxide   Date Value Ref Range Status   07/08/2021 23 20 - 32 mmol/L Final     Carbon Dioxide (CO2)   Date Value Ref Range Status   11/14/2023 24 22 - 29 mmol/L Final   11/15/2022 30 20 - 32 mmol/L Final     Anion Gap   Date Value Ref Range Status   11/14/2023 13 7 - 15 mmol/L Final   11/15/2022 3 3 - 14 mmol/L Final   07/08/2021 7 3 - 14 mmol/L Final     Glucose   Date Value Ref Range Status   11/14/2023 103 (H) 70 - 99 mg/dL Final   11/15/2022 113 (H) 70 - 99 mg/dL Final   07/08/2021 107 (H) 70 - 99 mg/dL Final     GLUCOSE BY METER POCT   Date Value Ref Range Status   12/03/2022 116 (H) 70 - 99 mg/dL Final     Urea Nitrogen   Date Value Ref Range Status   11/14/2023 46.9 (H) 8.0 - 23.0 mg/dL Final   11/15/2022 51 (H) 7 - 30 mg/dL Final   07/08/2021 24 7 - 30 mg/dL Final     Creatinine   Date Value Ref Range Status   11/14/2023 1.40 (H) 0.67 - 1.17 mg/dL Final   07/08/2021 0.94 0.66 - 1.25 mg/dL Final     GFR Estimate   Date Value Ref Range Status   11/14/2023 56 (L) >60 mL/min/1.73m2 Final   07/08/2021 86 >60 mL/min/[1.73_m2] Final     Comment:     Non  GFR Calc  Starting 12/18/2018, serum creatinine based estimated GFR (eGFR) will be   calculated using the Chronic Kidney Disease Epidemiology Collaboration   (CKD-EPI) equation.       Calcium   Date Value Ref Range Status   11/14/2023 10.1 8.8 - 10.2 mg/dL Final   07/08/2021 8.9 8.5 - 10.1 mg/dL Final     Phosphorus   Date Value Ref Range Status   11/14/2023 3.6 2.5 - 4.5 mg/dL Final     Albumin   Date Value Ref Range Status   11/14/2023 4.4 3.5 - 5.2 g/dL Final   11/15/2022 3.6 3.4 - 5.0 g/dL Final   07/08/2021 3.7 3.4 - 5.0 g/dL Final       Imaging:  I reviewed imaging studies.     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 However, he had worsening swelling and increased Cr to 1.39. At that time, it was concerned that he may have sirolimus toxicity. But he only had UCPR 0.39 g/g at that time and Sirolimus was stopped. I started him on low dose chlorthalidone and reduced dose amlodipine and his swelling improved but he developed hypotension so all blood pressure medications were discontinued.  His creatinine then came down to be stable at 1.1 with a EGFR in the 70s.  However, he recently developed acute kidney injury stage I likely secondary to vancomycin toxicity (mentioned below) in October 2023.   - Stay well hydrated  - Low salt diet; NA 2000 mg per day  # NEGRA stage 1 likely from vanco toxicity  Cr increased to peak at 1.87 (peaked at 10/12/23) from baseline of 1.-1.2. Vanco was stopped on 10/12/2023.  Cr improved to 1.36 on 10/23/23 then 1.26 on 11/6/23 but today is 1.40 with BUN 46. Unclear why is the rise, could be from dehydration. He is now on Augmentin and Doxycyline. UA is relatively bland and UPCR is pending. Will check BMP, UA and urine protein in 2 weeks. If worsening, this may indicate AIN.   # L5/S1 discitis; rescurrent  Started on Ertapenem and Vanco since August 2023, plan for 6 weeks but Vanco stopped on 10/12/23 due to NEGRA  replaced with Clindamycin but then switched to Augmentin and doxycycline.  Pain has improved.  Inflammatory markers improved.  ESR is down to  27.  CRP less than 3.  # Hypertension; well controlled  Blood pressure started to increase after he developed NEGRA.  It was as high as 180s sometimes.  Now after he resumed amlodipine 5 mg per dayand stop vancomycin blood pressure has improved.   -Continue Lodine 5 mg daily and blood pressure trend down, can reduce the dose to 2.5 mg daily  # Mild hypercalcemia; resolved  Calcium was mildly elevated at 10.3 last visit but today is 10.1. The patient is taking calcium vitamin D 1 tabs per day. Vit D is 65 on 6/13/23. PTH is 60 on 10/18/22.   # Restless leg syndrome  Potassium calcium and magnesium are normal. The onset is 2 weeks the patient also feels tired the past.  I will refer the patient to neurology.    Follow-up in 3 months with labs; BMP and UA and UPCR in 2 weeks     I spent  41 minutes on the date of the encounter doing chart review, history and exam, documentation and further activities as noted above. 26 minutes of this visit is dedicated to direct patient interaction via face to face.     Keegan Chew MD on 11/14/2023

## 2023-11-14 NOTE — NURSING NOTE
Port accessed with sterile technique. Flushed with saline and heparin. Port de-accessed.       Skin over port site is clean, dry, and intact. However, appears that port is starting to get closer to skin surface as it is becoming more prominent. Educated patient and spouse to continue to monitor port site and notify clinic if port becomes open to air. Verbalized understanding.      Patient requested port flushes be set up at [place] moving forward. IB sent to PRAMOD Pyle to arrange.      Alba Henry RN

## 2023-11-14 NOTE — LETTER
11/14/2023         RE: Nik Nava  [id number] [facility] [id number]        Dear Colleague,    Thank you for referring your patient, Nik Nava, to the [facility] [place]. Please see a copy of my visit note below.        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    HPI:    Nik returns to clinic for follow up.  He is doing much better today and i is in much better spirits.  Pain is essentially almost resolved.  He is set for repeat imaging in the near future with close follow-up.  Continues to be on antimicrobials. In terms of chronic GVHD symptoms are better controlled overall and stable, using eyedrops 3-4 times a day only down from more than 10 times per day earlier in the summer, not using scleral lenses regularly but believes ocular lenses have made a significant difference in his quality of life and ocular symptoms.  No new oral sores or ulcers and he believes that dexamethasone has been helping manage and control.   No new respiratory symptoms.  No chest pain.  No nausea vomiting or diarrhea.  No bleeding and no headaches.    Review of Systems                                                                                                                                         10 point Review of systems was negative except as detailed above     PHYSICAL EXAM                                                                                                                                                   KPS: 70      Wt Readings from Last 4 Encounters:   10/16/23 109.8 kg (242 lb 1.6 oz)   10/10/23 111.7 kg (246 lb 3.2 oz)   09/26/23 110.7 kg (244 lb)   08/15/23 110.8 kg (244 lb 4.8 oz)      General: NAD.  HEENT: sclera anicteric, injected conjunctivae.  11/14/2023 No noted lichenoid changes in the oral mucosa previous prominent area of healed  ulcers in the right/left mid buccal mucosa are less prominent with overall improved appearence and more normal appearing mucosal tissue, no ulcers seen.    Lungs:  CTA b/l  no wheezes  CV: RRR  no gallop  Abd; soft, NABS, protuberant  Ext/ Skin: Skin bruising on bilateral forearms,  fainting of previous hyperpigmented areas of previously known cGVHD  LE trace bilateral edema in lower extremities, left>right noted today; stable    cGVHD therapy started on February 24 2022  - Baseline NIH scoring 2/24/2022 : skin 2 maculopapular rash/erythema with no sclerotic features, mouth score 1 mild symptoms with lichenoid changes less than 25%, eyes score 2 moderate symptoms without new visual impairments due to KCS requiring lubricant eyedrops more than 3 times a day, overall mild scale for her provider  - 3/25/2022 1 month NIH treatment assessment on PQRST: skin 2, mouth score 1 mild symptoms with NO lichenoid changes, eyes score 2 moderate symptoms w requiring lubricant eyedrops more than 3 times a day, liver score 1 ALT 3.7x ULN and nl bilirubin   - 3/31  Mouth clear; eyes minimal LFT still abn; no joint or new GI sx  - 4/28 Mouth clear, Left>R eye is mild sclera erythema, lids are pink and irritated appearing, LFT's slow improvement, no new joint, skin, or GI symptoms.   -5/11 mouth with mild erythema but no lichenoid changes, eyes using eyedrops 4-6 times a day score of 2, LFTs significantly improved from baseline slight elevation in alk phos and AST with normal bilirubin, skin with hyperpigmentation from old acute GVHD no active skin rashes, no GI symptoms no musculoskeletal complaints.  -5/26 Mouth with very slight lichenoid changes, erythema.  Eyes still using eyedrops 4-6x per day.  LFTs essentially normal with slight elevation in alk phos.  Skin with hyperpigmentation from old acute GVHD, no active rashes, no GI or MSK concerns.  Skin 0, mouth 0 but with lichenoid changes, eyes 2  -6/7/22 Mouth with no lichenoid  changes, erythema.  Eyes still using eyedrops 4-6x per day.  LFTs essentially normal with slight elevation in alk phos.  Skin with hyperpigmentation from old acute GVHD, no active rashes, no GI or MSK concerns.  Skin 0, mouth 0, eyes 2     -6 month PQRST assessment 9/6/2022: eyes 3, mouth 0 for symptoms, 25% lichenoid changes (1), liver/GI/skin 0    11/8/2022, 11/22/2022, 12/13/22 cGVHD NIG scoring eyes 3, no other organ involvement clinically  3/28/23 cGVHD NIG scoring eyes 3, no other organ involvement clinically  5/2/23 cGVHD NIG scoring eyes 3, oral 1, no other organ involvement clinically  6/13/23 cGVHD NIG scoring eyes 3, oral 1, no other organ involvement clinically  8/15/23 cGVHD NIG scoring eyes 3 (down to 3-4 times eye drop from >10 but still using scleral lenses), oral 1, no other organ involvement clinically  10/10/2023 cGVHD NIG scoring eyes 2-3 (down to 3-4 times eye drop from >10 but still using scleral lenses), oral 1, no other organ involvement clinically  11/14/2023 cGVHD NIG scoring eyes 2-3 (down to 3-4 times eye drop from >10 but still using scleral lenses), oral 1, no other organ involvement clinically    Lab:    Lab Results   Component Value Date    WBC 13.0 (H) 10/10/2023    ANEU 3.5 10/26/2021    HGB 10.9 (L) 10/10/2023    HCT 32.6 (L) 10/10/2023     10/10/2023     10/10/2023    POTASSIUM 3.5 10/10/2023    CHLORIDE 101 10/10/2023    CO2 28 10/10/2023     (H) 10/10/2023    BUN 26.0 (H) 10/10/2023    CR 1.56 (H) 10/10/2023    MAG 1.9 10/10/2023    INR 0.90 12/01/2022    BILITOTAL 1.0 10/10/2023    AST 30 10/10/2023    ALT 26 10/10/2023    ALKPHOS 141 (H) 10/10/2023    PROTTOTAL 7.0 10/10/2023    ALBUMIN 4.1 10/10/2023     4/28/2023    MRI Lumbar spine  1.  The previously seen ventral epidural abscess has resolved with small amount of residual ventral epidural phlegmon.   2.  Marrow edema about the L5-S1 endplates has mildly worsened within new rim-enhancing subchondral  lesion in the left S1 superior endplate. This could reflect progression of osteomyelitis.      MRI hip right  1.  No evidence of septic arthritis of the right hip joint.   2.  There is degeneration and likely tearing of the right acetabular labrum anterosuperiorly, and probably low-grade chondromalacia of the right hip joint.   3.  Abnormality at L5-S1 compatible with known discitis-osteomyelitis.    ASSESSMENT AND PLAN   Nik Nava is a 63 year old male with Ph Pos ALL, day 825 s/p sib allo stem cell transplant    Day -6 (8/4): flu/cy  Day -5 through day -2 (8/5-8/8): flu  Day -1 (8/9): TBI  Day 0 (8/10): transplant     1.  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 S/p allo sib PBSCT. (ABO matched)  HCT-CI score: 3 (prior solid tumor)  Day +100 bone marrow biopsy is 100% donor with no morphologic or flow cytometric evidence of leukemia BCR abl is pending.  - Day +180 restaging BM bx- still in CR  100% donor;  2/16/22 BCR ABL major breakpoint undetectable.   - 1 year restaging 8/18/2022: Markedly hypocellular bone marrow [less than 10% cellular] with maturing trilineage hematopoiesis and no definite morphologic or immunophenotypic evidence for involvement by B lymphoblastic leukemia. BCRABL1 PCR not detected, bone marrow % donor. FISH NORMAL No evidence of BCR-ABL1 fusion  - Maintenance with TKI posttransplantation given his Ph positive ALL that have not been started previously presumed secondary to multiple active issues specifically infections and COVID.  Per Avila Tobar: will reconsider this patient will think about whether this is something he would like to consider he was previously on Dasatinib, we would start on a low dose and increase as tolerated.  This is on hold now pending active GVHD therapy, we discussed on this visit 10/18 in agreement with holding off.  - 2 year restaging 8/7/2023 BMB: - No morphologic or immunophenotypic evidence of recurrent/persistent  B-lymphoblastic leukemia. Slightly hypocellular marrow (10-20% estimated cellularity, patchy and variable), with trilineage hematopoetiic maturation and less than 2% blasts. Peripheral blood showing slight normocytic normochromic anemia and slight thrombocytopenia. BM % donor. FISH No evidence of BCR::ABL1 fusion /CG No recipient (male) cells  or cells with a t(9;22) or other clonal chromosomal abnormality were  detected among the 20 metaphases analyzed.      2.  HEME: CBC stable.   3/2023 iron low 28, iron saturation index 10%, transferrin 225, ferritin 1381 down trending (in retrospect that was the time of epidural abscess as well).  B12, folate normal.  High copper and zinc borderline low, 5/2023 started zinc.  Started iron replacement in 4/2023, recheck iron profile on follow up more consistent with AOCD, discontinued iron. Iron 11/14/23 WNL. Note ferritin elevation is in the setting of discitis/OM, will reassess.    3.  FEN/Renal: Creatinine 0.97, s/p nephrectormy, nephrology following     4.    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   10/10/2023 cGVHD NIG scoring eyes 2-3, oral 1, no other organ involvement clinically   11/14/2023 cGVHD NIG scoring eyes 2-3, oral 1, no other organ involvement clinically   #Skin GVHD- history of biopsy proven GVHD of the skin 8/30/2021; resolved.   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 WNL today   # Ocular GVHD: follows with ophthalmology. Maxitrol to lids, continue refreshing drops QID. Score 3, but down to 3-4 times eye drops from >10/day. Not needing Sl all the time.  Followed closely by Dr. Juarez with significant improvement with scleral lenses and eyedrops  # Oral GVHD: dex s/s <2-3 x.  Lichenoid changes have largely resolved with no open ulcers since 11/8/2022, except for improving lichenoid changes to the mid right/left buccal mucosa    Previous therapies  Tacrolimus-previously  decided to stay on same dose, no checks of levels if clinically stable, and no need switch to sirolimus. (keep tac low as gvhd is quiet and viremia). 5/10 level 45 with starting of COVID therapy and D-D intractions (Paxlovid for COVID19, which has a drug interaction with tacrolimus-Ritonavir increases serum levels of tacrolimus).   Tacrolimus level subtherapeutic, increase to 2.5 mg twice daily recently, 8/23/2022 instructed to change tacrolimus to 2 mg twice daily. 9/6 with mild NEGRA, hyperkalemia, hypomagnesemia, and reports some tremors and cramps, suspect tacrolimus to be high therefore empirically decreased to 1.5 twice daily, skip morning dose of tacrolimus and took 2 mg today after his afternoon labs. Decrease to 1mg BID on Saturday.  Sirolimus  9/21 despite fluids and a dose adjustment of tacrolimus patient seem to have persistence NORBERTO related NEGRA, therefore after discussion with the patient decision was made to transition to sirolimus that was started on 9/21, patient initially seem to tolerate this well with normalization of kidney function  10/11 presented with flares of ocular and oral GVHD, fluid retention and gain of 5-6 kg, lower extremity edema, abdominal distention, total protein to creatinine ratio elevated at 0.4, in addition to elevation in BUN and creatinine, HTN.  Picture most consistent with sirolimus related side effects.  Less likely that the patient will tolerate even a lower level of sirolimus.  Therefore the patient was instructed to stop sirolimus, last dose on 10/10. 10/14 level 3.5 appropriately trending down.     Corticosteroids  3/1-3/16: prednisone 100mg every day  3/17 75mg every day   3/31 75 alt with 50  4/6: 75 alt with 25mg every other day  4/12 pred tapered to 60 alternating with 25mg   4/15 In setting of viruses trying to reactivate,GVHD stable: Taper 60/15mg alternating.  4/20 decrease pred further to 50/10.    4/25 taper pred to 50/0 every other day as long as symptoms  stable.   5/2 Pred decrease 40/0 alternating every other day.   5/13 decrease to 30/0  5/20 decrease to 20/0 then slowly by 5 mg weekly  6/7 decrease to 10/0  Went up to 15/0 with mild increased LFTs  8/23/2022 increased to 20/0, with persistence of oral ulcers and active ocular GVHD.  Discussed with the patient the importance of stopping chewing of nicotine gums for prolonged hours as that would not help with the healing of the oral ulcers.  I discussed with the patient alternatives of nicotine patches that he will reconsider and let me know.  9/6 decreased to 15 alternating with 0  9/13 decreased to 10 alternating with 0  9/21 discontinued tacrolimus given persistent NEGRA and single kidney and start the patient on sirolimus without loading dose.  We will get a list of possible side effects and adverse events from the Pharm.D. see sirolimus section above.  10/11 GVHD flare. Sirolimus stopped. Resume prednisone 40 mg daily as of 10/12, no change with mildly worsening eye pain 10/14  Reduce pred to 35mg 10/25 and 25mg 11/1 11/8 20 mg   11/22 15 mg  12/13/22 10 mg (plan to taper to 5mg then slow taper 1 mg per month given long pred history)  2/23/23 lower from 5 mg to 4 mg for the next month  3/28/2023 - 5/2/2023- 8/15/2023 continue on 10/4 given adrenal insufficieny with recent am cortisol check and clinical symptoms on taper to lower dose of 4 mg daily    Belumosudil  Patient intolerant/with disease flares on tacrolimus and sirolimus see above.  Discussed with the patient the different options for therapy of chronic GVHD that are FDA approved.  Given the side effect profiles after discussing in detail and given the least nephrotoxicity and expected response, taking into account other metabolic effect as well.  We will proceed with prior authorization with belumosudil.  Patient was given material to review for possible expected adverse events and side effects with both Belumosodil and ruxolitinib.   --- Started on  10/18/2022 - skin/liver/oral GVHD inactive, and occular symptoms controlled/symptomatic improvement.   --- 10/10/2023 will hold belumosodil given recurrent infections ans significant improvement in symptoms with no end organ damage after discussions with him and his wife. Will optimize topical treatment with scleral lenses, eyedrops ans dex s/sp int he interim to avoid flares. Will readdress need to restart systemic treatment pending infectious resolution and symptoms.      5.  ID:   # Recent history of Epidural abscess: Followed by Dr. Candelario ID, plan to be on IV ceftriaxone for at least 6 weeks course through 5/11/2023-to be determined on further follow-up.  See imaging with MRI follow-up as above.   #Recurrent discitis/OM still on treatment through October 2023, cx negative, managed with ID locally.  3/30/2023 blood culture with Staph epidermidis  3/31/2023 BONE, L5, FLUOROSCOPICALLY-GUIDED NEEDLE BIOPSY: Benign bone with trilineage hematopoiesis (normal) Negative for neoplasm Negative for acute osteomyelitis. DISC, L5-S1, ASPIRATION FOR CYTOLOGY: Acellular debris; no cellular material present for evaluation     #History of COVID x2 last 1/2023 and influenza    Treated with Paxlovid with persistent fatigue but no active respiratory symptoms  received his influenza annual vaccine as well as COVID boosters locally 11/16/2022     #History of pulmonary infiltrates:   4/20 CT chest with new RUL infiltrate. Highly immunocompromised. 4/22 Fungitell/asp GM were neg. BAL 4/29 NGTD, increased micafungin to 150mg IV daily-- f/u 6/1 Dr Gacria CT with mixed results, followed with Dr. Garcia August 2022 with resolution of pulmonary nodules and normalization of LFTs we will switch patient will switch patient to posaconazole prophylactic dose and monitor LFTs and any infectious concerns closely. Will continue till we determine durable discontinuation of IST given high risk history.      #History of CMV viremia:    - CMV viremia up  to 1100. 4/15 started valcyte 900mg PO BID. 4/20 CMV <137, 5/10 ND, decreased to 450mg BID 4/30 - continue 4 weeks as long as CMV <137 till 5/28 + weekly CMV. Switched to acyclovir after completion of therapy 5/28/2022. recheck 3/1/23 ND     - PPx:   ACV, will start Shingrix vaccination series and discontinue in 2 months.  Posaconazole (previously on micafungin with LFts abl, hx of pulmonary nodules needign treatment dose). 11/14/2023 discontinue and check CT in 2 months given history.  Pentamidine, last 10/20/2023. Will schedule next dose.    11/2023 Discontinue azithro 250mg daily (stopped levaquin due to possible tendonitis).  He is on Augmentin and doxycycline for discitis/osteomyelitis.  IgG1/2023 364, 4/2023 460      - EBV viremia: 4/20 CAP CT (w/ contrast): No adenopathy. S/p 4/22 and 5/4/22 rituximab 375mg/m2 5/10 ND x2, 6/7 ND, 8/18/2022 971, 3/28/22 843  S/p covid vaccine series 12/2021  S/p evusheld     - vaccinations: Previously deferred annual vaccines in the setting of GVHD and immunosuppression therapy. 11/14/2023 we will start vaccination series, including Shingrix, COVID-vaccine.     6. Endo:   - Hx of graves disease; On synthroid follows with endocrine  - HLD: 11/2022 cholesterol 355, triglycerides 153, -- 11/8 started rosuvastatin 5 mg daily, 11/22 CPK within normal, 5/2/2023 repeat lipid profile cholesterol down to 202, triglycerides 191, LDL now normal at 95.  We will continue same dose of rosuvastatin and add fish oil 1g BID (on hold). Repeated August with PCP     7. CV:   - Hypertension history   - TTE most recently 10/2022     8. GI:   -Omeprazole for heartburn  -LFTs as above, now normal. Off ursodiol     9. Psych:   - Situational anxiety - lexapro 10mg daily.   - Insomnia: worse on steroids. Ativan, trazadone, melatonin     10. MSK:   -History of left food drop, PT. Occasional muscle cramps discussed optimizing vitamin D levels and considering vitamin D, some of the symptomatology of  muscle cramps are likely related to chronic GVHD.  No muscle cramps reported today.  -4/2023 new tearing of the right acetabular labrum anterosuperiorly referred to to orthopedic surgery.       11. HCM/Age appropriate cancer screening:  -Discussed with the patient importance of age-appropriate cancer screening including colonoscopies, he is due for this.  -Discussed importance of at least once a year vitamin D level check, 8/18/2022 wnl  -Screening for secondary iron overload, see heme section above  -Yearly TSH 2022 wnl; yearly lipid panel - see GI section above  -9/6/2022 DEXA scan Based on BMD diagnosis is consistent with normal bone density based on WHO criteria Ref. 1   -PFTs 9/20/22, see above. 9/21/2023 PFTs The FVC, FEV1, FEV1/FVC ratio and NDZ66-79% are within normal limits. FVC 99% (108), FEV1 91%, DLCO uncorrected 91%.  Repeat PFTs in 4 to 6 months.  -Metabolic other, 8/2022 testosterone within normal  -11/22/2022 discussed with the patient the challenges and the stresses of chronic illness and healthcare fatigue.  Patient had previously been on Lexapro that we restarted confirmed with pharmacy that there are no drug drug interactions.  Additionally we will referred patient to PMNR to help with physical rehab and strength to help with fatigue.  Our social workers will also reach out to Nik and his wife for further resources including support groups for patients with chronic illness and fatigue post transplant.  -Referral to palliative care for symptom and pain management including insomnia     Plan:  -Discontinue posaconazole and repeat CT chest in 2 months given his history  -Continue off belumosodil  -Decrease dexamethasone to 3 times a day from 4 times a day and monitor oral symptoms  Schedule pentamadine monthly and 2 months upon follow-up  -Initiate vaccination series today we will obtain boosters in 2 months  COVID-vaccine today  - follow with nephrology, endocrinology, PCP, follow-up locally with  infectious disease, neurosurgery and pain management for management of osteomyelitis and pain control. PCP follow up for lipid profile, age appropriate cancer screening  - RTC 2 months or earlier as needed    I spent 45 minutes in the care of this patient today, which included time necessary for preparation for the visit, obtaining history, ordering medications/tests/procedures as medically indicated, review of pertinent medical literature, counseling of the patient, communication of recommendations to the care team, and documentation time.    Akshat Tobar MD

## 2023-11-14 NOTE — PATIENT INSTRUCTIONS
Will check labs in 2 weeks  Drink at least 70 Oz per day  Referral for neurology  See you in 3 months with labs

## 2023-11-14 NOTE — NURSING NOTE
Pt received his 12 month Vaccines  COVID  Pedirix  Prevnar  Shingrix  HIB    After the COVID Vaccine (right arm) there was some slight bruising. I alerted the wife and showed her. She is a RN.  She gave the pt some Tylenol.     They are being observed for 15 mins after the injections.    See KAYLENE Weeks, A

## 2023-11-14 NOTE — LETTER
11/14/2023       RE: Nik Nava  [address] [facility] [id number]     Dear Colleague,    Thank you for referring your patient, Nik Nava, to the [facility]PHR[facility] [place] at [facility]. Please see a copy of my visit note below.      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      Oncologic history: The patient was diagnosed with Ph+ ALL in 2020 after presented with feeling unwell and found to have leukocytosis and blast in his blood. He was treated with Dex and Dasatinib then 2 cycles of vincristine, prednisone, daunorubicin, and pegasparaginase with intrathecal methotrexate. Last ly, he received 1 course of high dose Jaymie-C. He achieved CR with no MRD. Subsequently, he underwent allo sib NMA (flu/cy+TBI) PBSCT on  8/10/21. hematocrit-CI was 3.  The patient was noted to be in CR with BmBx at D100, D180 and 1Y showing 100% donor. He has not been started on TKI due to previous multiple active issues. Post Tx complication included cGVHD at skin, eyes, o liver (all Bx proven, except eyes, clinically) started since 2/22. The current active cGVHDD involved eyes and skin. He was on Tacrolimus for cGVHD but later discontinued due to NEGRA, hyperkalemia hypomagnesemia, tremors and cramps in 9/22. Sirolimus was started in 9/21/22 in replacement of Tac. Cr improved, but the presented on 10/11/22 with ocular and cGVHD flare, fluid retention and gain 5-6 kg. BMT thought this is sirolimus Tox? (of note UPCR was only 0.4 and normal SAlb). Sirolimus was then stopped. Prednisone was started at 40 mg with slow tapering. He was also started on Belumosudil on 10/18/22 (ROCK2  inhibitors:Rho-associated coiled-coil kinase )  Other pertinent Hx:   - He had PE in the setting of receiving PEG asparaginase and was treated with Xarelto which is now completed.   - He had hyperferritinemia which now being followed closely. Not on iron chelators.  - He had COVID -19 in 5/22.  - CMV viremia  - Grave disease  - HTN  Kidney history: He had RCC s/p Rt nephrectomy in 2007.  The patient has baseline creatinine of 0.9-1 pretransplant.  He developed NEGRA in 9/21 with Cr peaked at 1.89 but then come down to baseline. He had NEGRA again in 9/22 with Cr peaked at 1.8, suspect that due to tacrolimus toxicity and it was stopped. Cr improved but now still fluctuates between 1.1-1.3.  The most recent creatinine was on 10/31/2022 at 1.08.  Serum albumin is 3.3. UCPR is 0.39 g/g on 10/18/22. UA on 9/12/21 and 10/18/22 showed no protein, no blood.  However UA on 9/12/2021 showed 8 hyaline cast.      11/1/22: He is doing well except that he still has LE edema. It has improved from when he was on Sirolimus but still quite a bit. Edema has actually started since 2021 but unclear when.But it is certainly getting worse lately when he was treated with high dose steroid and sirolimus.His cGVHD is o/eva all stable but not completely gone. He was just started Belumosudil on 10/18/22 for cGVHD. He just saw BMT today and they are decreasing his steroid.Amlodipine started since 8/21 and possibly related to his swelling. He has multiple SEs from steroid such as weight gain , central obesity, easily bruised, increased appetite and partial insomnia. He has some nocturia. He has no dysuria or hematuria. Recently, he had cat scrath incidence and being treated with doxycycline. He has abdominal distension and gained weight progressively. He drinks 120 Oz of tea per day. We started chlorthalidone and reduced amlodipine.   12/1-12/3/22: Was admitted for dyspnea and malaise concerning for belumosudil intolerance. However, at the same time  he also had flu A positive. He was taken off of amlodipine on 12/6/22. And subsequently chlorthalidone was off in 12/13/22 when Na was 128. Na improved afterwards.   1/10/23:  He feels good today. His BP are now very good 110-130/70 mmHg. He is not on any BP medication ayt this time. He is taking Belumosidil. Symptoms of cGVHD have been improving.  He has some LE edema and easily bruised likely from steroid/belumosudil.   Labs: Creatinine 1.11, potassium 4.8,, dioxide 28, phosphorus 2.3, albumin 4.2, calcium 10.1.  Hemoglobin 11.3, platelet 140. UA pending.   6/13/23: In the interim, he went to the ED on 5/14/23 for testicular pain. CT showed no hernia or mass. Hew was also diagnosed with spine osteomyelitis and Epidural abscess and being treated with Ceftraixone for 6 weeks. Noted stable cGVHD. He also had COVID in 1/23. Now on physiologica dose of steroid. Still has dry eyes and a bit of mouth sore.  He drinks about 3-4 glasses of milk per day. He still has back pain that radiating downt to right leg. He completed ceftriaxone in 5/25/23. Appetite is stable as well as his weight. He has minimal swelling. He is on prednisone 4 mg alternate with 10 mg per day. Labs 6/13/23: Cr 1.10, BUN 46.2, K 5.1, Calcium 10.3, Phos 3.1, Albumin 4.0. Hb 11.7, Platelet 182, WBC 10.1.   10/16/23: In interim, he was diagnosed with L5-S1 discitis osteomyelitis at [facility] after presented with ongoing back pain.  MRI showing findings consistent with ongoing discitis-osteomyelitis at L5-S1 which has progressed since last imaging in June. He was seen by ID and neurosurgery. He underwent disc aspiration.  ID is recommending another 6 weeks course of antibiotics with a different regimen: Invanz and vancomycin. Neurosurgery did not recommend any surgical intervention at this time. Vanco dose was reduced on 10/10/23 due to kidney function and stopped last Thursday when Cr was 1.87. Lately, his blood pressure has been increasing to  150-160 and sometimes 180. I asked him to resume amlodipine 5 mg.  After stopping vancomycin, BP has improved along with his appetite. Cr has improved  to 1.68 on 10/16/23 ([facility] labs). He has gained some weight. His swelling was worse when he was admitted in the hospital. Now he is on Ertapenenm and clindamycin oral until 10/23. Pain is mostly when he lays too long and going to get up. He is still using fentanyl patch but the dose was reduced. Belumosidil was just on hold.  Labs on 10/10/2023 showed creatinine 1.56 but Cr 1.678 on 10/16/23 ([facility] lab), BUN 26, GFR 49, potassium 3.5,, dioxide 28, calcium 10.2, albumin 4.1, alkaline phosphatase 141.  CBC show white blood cell 13, hemoglobin 10.9, platelet 213. Vancomycin at [facility] was 21.4 on 10/9/23. CRP 2.2 and ESR 46. UA on 10/12/23 showed no blood but UPCR is 0.2 g/g.   11/14/23: He has been on Augmentin and Doxycyline plan until end of this month . Clindamycin upsets his currently due to main complaint is low energy and restless leg syndrome that started about 2 weeks ago.  He recently stopped taking magnesium and iron a month ago.  Stomach. He has minor swelling. He is off of fentanyl 2 weeks ago. BP has been in the 120-130/80s. His Cr has improved and from [facility] lab, Cr 1.26 on 11/6/23. He has been eating and drinking well. He is on prednisone. He started to have restless leg.  Labs today show WBC 11.5, Hb 11.6.  Creatinine 1.4, EGFR 56, potassium 4.8, bicarb 24.  calcium 10.1 albumin 4.4.  CRP less than 3, sed rate 27.  UA showed protein 50 mg/dL.  Platelets of 2, RBC less than 1.UPCR pending.     Past medical history  Past Medical History:   Diagnosis Date    ALL (acute lymphocytic leukemia) (H)     CKD (chronic kidney disease)     GVHD (graft versus host disease) (H)     H/O peripheral stem cell transplant (H)     Hyperthyroidism 1996    Infection due to 2019 novel coronavirus 01/16/2023    Influenza A 11/2022    Postablative hypothyroidism 1997     Pulmonary embolism (H)     Renal cell carcinoma (H) 2007    right kidney    Sleep apnea        Past surgical history  Past Surgical History:   Procedure Laterality Date    BACK SURGERY  2017    BONE MARROW BIOPSY, BONE SPECIMEN, NEEDLE/TROCAR Left 09/02/2021    Procedure: BIOPSY, BONE MARROW;  Surgeon: Jailyn yN APRN CNP;  Location: [facility] OR    BONE MARROW BIOPSY, BONE SPECIMEN, NEEDLE/TROCAR Left 11/15/2021    Procedure: BIOPSY, BONE MARROW;  Surgeon: Socrates De La Torre;  Location: [facility] OR    BONE MARROW BIOPSY, BONE SPECIMEN, NEEDLE/TROCAR Left 02/07/2022    Procedure: BIOPSY, BONE MARROW;  Surgeon: Renetta Wiggins PA-C;  Location: [facility] OR    BONE MARROW BIOPSY, BONE SPECIMEN, NEEDLE/TROCAR Left 08/18/2022    Procedure: BIOPSY, BONE MARROW;  Surgeon: Lorrie Yap PA-C;  Location: [facility] OR    BONE MARROW BIOPSY, BONE SPECIMEN, NEEDLE/TROCAR Left 8/7/2023    Procedure: Bone marrow biopsy, bone specimen, needle/trocar;  Surgeon: Teressa Rick PA-C;  Location: [facility] OR    BRONCHOSCOPY (RIGID OR FLEXIBLE), DIAGNOSTIC N/A 04/29/2022    Procedure: BRONCHOSCOPY, WITH BRONCHOALVEOLAR LAVAGE;  Surgeon: Murtaza Garcia MD;  Location: [facility] GI    IR CVC TUNNEL PLACEMENT > 5 YRS OF AGE  08/04/2021    IR CVC TUNNEL REMOVAL LEFT  09/02/2021    IR LIVER BIOPSY PERCUTANEOUS  02/21/2022    NEPHRECTOMY  2007    PERCUTANEOUS BIOPSY LIVER N/A 02/21/2022    Procedure: NEEDLE BIOPSY, LIVER, PERCUTANEOUS;  Surgeon: Trace Castellanos MD;  [place] [facility]       Review of Systems:   14 systems were reviewed and all negative except as mentioned above.   Current Medications:  Current Outpatient Medications   Medication    acetaminophen (TYLENOL) 500 MG tablet    acyclovir (ZOVIRAX) 800 MG tablet    amLODIPine (NORVASC) 5 MG tablet    amoxicillin-clavulanate (AUGMENTIN) 875-125 MG tablet    Carboxymethylcell-Glycerin PF (REFRESH RELIEVA PF) 0.5-1 % SOLN    dexamethasone alcohol-free (DECADRON) 0.1 MG/ML solution     Doxycycline Hyclate 100 MG TBEC    escitalopram (LEXAPRO) 10 MG tablet    fluorometholone (FML LIQUIFILM) 0.1 % ophthalmic suspension    hydrOXYzine (ATARAX) 10 MG tablet    levothyroxine (SYNTHROID/LEVOTHROID) 137 MCG tablet    methocarbamol (ROBAXIN) 750 MG tablet    nicotine polacrilex (NICORETTE) 4 MG gum    omeprazole (PRILOSEC) 40 MG DR capsule    oxyCODONE (ROXICODONE) 5 MG tablet    predniSONE (DELTASONE) 1 MG tablet    predniSONE (DELTASONE) 5 MG tablet    rosuvastatin (CRESTOR) 5 MG tablet    sennosides (SENOKOT) 8.6 MG tablet    sodium chloride 0.9 % neb solution    LORazepam (ATIVAN) 1 MG tablet    Omega-3 Fish Oil 500 MG capsule     No current facility-administered medications for this visit.     Facility-Administered Medications Ordered in Other Visits   Medication    heparin 100 unit/mL injection 5 mL       Physical Exam:   /81 (BP Location: Right arm, Patient Position: Sitting, Cuff Size: Adult Large)   Pulse 61   Temp 98.2  F (36.8  C) (Oral)   Wt 110.3 kg (243 lb 1.6 oz)   SpO2 98%   BMI 32.08 kg/m     Body mass index is 32.08 kg/m .    GENERAL APPEARANCE: Alert, not in acute distress; pleasant.   EYES:  Not pale conjunctiva, pupils equal  HENT: Mouth without ulcers or lesions  PULM: lungs clear to auscultation bilaterally, equal air movement, no clubbing  CV: regular rhythm, normal rate, no rub     -JVD no distended.      -edema: NO edema.   GI: soft,  - tender, no distended, bowel sounds are present  INTEGUMENT: No rashes.   NEURO:  Non focal. No asterixis.     Labs:   All labs reviewed by me  Last Renal Panel:  Sodium   Date Value Ref Range Status   11/14/2023 140 135 - 145 mmol/L Final     Comment:     Reference intervals for this test were updated on 09/26/2023 to more accurately reflect our healthy population. There may be differences in the flagging of prior results with similar values performed with this method. Interpretation of those prior results can be made in the context  of the updated reference intervals.    07/08/2021 139 133 - 144 mmol/L Final     Potassium   Date Value Ref Range Status   11/14/2023 4.8 3.4 - 5.3 mmol/L Final   11/15/2022 5.0 3.4 - 5.3 mmol/L Final   07/08/2021 3.9 3.4 - 5.3 mmol/L Final     Potassium POCT   Date Value Ref Range Status   12/01/2022 4.8 3.4 - 5.3 mmol/L Final     Chloride   Date Value Ref Range Status   11/14/2023 103 98 - 107 mmol/L Final   11/15/2022 101 94 - 109 mmol/L Final   07/08/2021 108 94 - 109 mmol/L Final     Carbon Dioxide   Date Value Ref Range Status   07/08/2021 23 20 - 32 mmol/L Final     Carbon Dioxide (CO2)   Date Value Ref Range Status   11/14/2023 24 22 - 29 mmol/L Final   11/15/2022 30 20 - 32 mmol/L Final     Anion Gap   Date Value Ref Range Status   11/14/2023 13 7 - 15 mmol/L Final   11/15/2022 3 3 - 14 mmol/L Final   07/08/2021 7 3 - 14 mmol/L Final     Glucose   Date Value Ref Range Status   11/14/2023 103 (H) 70 - 99 mg/dL Final   11/15/2022 113 (H) 70 - 99 mg/dL Final   07/08/2021 107 (H) 70 - 99 mg/dL Final     GLUCOSE BY METER POCT   Date Value Ref Range Status   12/03/2022 116 (H) 70 - 99 mg/dL Final     Urea Nitrogen   Date Value Ref Range Status   11/14/2023 46.9 (H) 8.0 - 23.0 mg/dL Final   11/15/2022 51 (H) 7 - 30 mg/dL Final   07/08/2021 24 7 - 30 mg/dL Final     Creatinine   Date Value Ref Range Status   11/14/2023 1.40 (H) 0.67 - 1.17 mg/dL Final   07/08/2021 0.94 0.66 - 1.25 mg/dL Final     GFR Estimate   Date Value Ref Range Status   11/14/2023 56 (L) >60 mL/min/1.73m2 Final   07/08/2021 86 >60 mL/min/[1.73_m2] Final     Comment:     Non  GFR Calc  Starting 12/18/2018, serum creatinine based estimated GFR (eGFR) will be   calculated using the Chronic Kidney Disease Epidemiology Collaboration   (CKD-EPI) equation.       Calcium   Date Value Ref Range Status   11/14/2023 10.1 8.8 - 10.2 mg/dL Final   07/08/2021 8.9 8.5 - 10.1 mg/dL Final     Phosphorus   Date Value Ref Range Status   11/14/2023  3.6 2.5 - 4.5 mg/dL Final     Albumin   Date Value Ref Range Status   11/14/2023 4.4 3.5 - 5.2 g/dL Final   11/15/2022 3.6 3.4 - 5.0 g/dL Final   07/08/2021 3.7 3.4 - 5.0 g/dL Final       Imaging:  I reviewed imaging studies.     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 However, he had worsening swelling and increased Cr to 1.39. At that time, it was concerned that he may have sirolimus toxicity. But he only had UCPR 0.39 g/g at that time and Sirolimus was stopped. I started him on low dose chlorthalidone and reduced dose amlodipine and his swelling improved but he developed hypotension so all blood pressure medications were discontinued.  His creatinine then came down to be stable at 1.1 with a EGFR in the 70s.  However, he recently developed acute kidney injury stage I likely secondary to vancomycin toxicity (mentioned below) in October 2023.   - Stay well hydrated  - Low salt diet; NA 2000 mg per day  # NEGRA stage 1 likely from vanco toxicity  Cr increased to peak at 1.87 (peaked at 10/12/23) from baseline of 1.-1.2. Vanco was stopped on 10/12/2023.  Cr improved to 1.36 on 10/23/23 then 1.26 on 11/6/23 but today is 1.40 with BUN 46. Unclear why is the rise, could be from dehydration. He is now on Augmentin and Doxycyline. UA is relatively bland and UPCR is pending. Will check BMP, UA and urine protein in 2 weeks. If  worsening, this may indicate AIN.   # L5/S1 discitis; rescurrent  Started on Ertapenem and Vanco since August 2023, plan for 6 weeks but Vanco stopped on 10/12/23 due to NEGRA  replaced with Clindamycin but then switched to Augmentin and doxycycline.  Pain has improved.  Inflammatory markers improved.  ESR is down to 27.  CRP less than 3.  # Hypertension; well controlled  Blood pressure started to increase after he developed NEGRA.  It was as high as 180s sometimes.  Now after he resumed amlodipine 5 mg per dayand stop vancomycin blood pressure has improved.   -Continue Lodine 5 mg daily and blood pressure trend down, can reduce the dose to 2.5 mg daily  # Mild hypercalcemia; resolved  Calcium was mildly elevated at 10.3 last visit but today is 10.1. The patient is taking calcium vitamin D 1 tabs per day. Vit D is 65 on 6/13/23. PTH is 60 on 10/18/22.   # Restless leg syndrome  Potassium calcium and magnesium are normal. The onset is 2 weeks the patient also feels tired the past.  I will refer the patient to neurology.    Follow-up in 3 months with labs; BMP and UA and UPCR in 2 weeks     I spent  41 minutes on the date of the encounter doing chart review, history and exam, documentation and further activities as noted above. 26 minutes of this visit is dedicated to direct patient interaction via face to face.     Keegan Chew MD on 11/14/2023            Again, thank you for allowing me to participate in the care of your patient.      Sincerely,    Keegan Chew MD

## 2023-11-14 NOTE — NURSING NOTE
"Oncology Rooming Note    November 14, 2023 3:04 PM   Nik Nava is a 63 year old male who presents for:    Chief Complaint   Patient presents with    Oncology Clinic Visit     Status post bone marrow transplant     Initial Vitals: /81   Pulse 61   Temp 98.2  F (36.8  C) (Oral)   Resp 18   Wt 110.2 kg (243 lb)   SpO2 98%   BMI 32.07 kg/m   Estimated body mass index is 32.07 kg/m  as calculated from the following:    Height as of 10/16/23: 1.854 m (6' 0.99\").    Weight as of this encounter: 110.2 kg (243 lb). Body surface area is 2.38 meters squared.  No Pain (0) Comment: Data Unavailable   No LMP for male patient.  Allergies reviewed: Yes  Medications reviewed: Yes    Medications: Medication refills not needed today.  Pharmacy name entered into [employer]:    [facility] - [place], [place] - [address] [facility] [place], [place] - [address] [place] [address] [phone]  [facility]    Clinical concerns:        Lauren Sommer CMA              "

## 2023-11-15 DIAGNOSIS — R79.89 LOW SERUM CORTISOL LEVEL: Primary | ICD-10-CM

## 2023-11-15 LAB
C3 SERPL-MCNC: 167 MG/DL (ref 81–157)
C4 SERPL-MCNC: 43 MG/DL (ref 13–39)

## 2023-11-16 ENCOUNTER — VIRTUAL VISIT (OUTPATIENT)
Dept: PALLIATIVE MEDICINE | Facility: CLINIC | Age: 63
End: 2023-11-16
Payer: COMMERCIAL

## 2023-11-16 DIAGNOSIS — M46.26 OSTEOMYELITIS OF LUMBAR SPINE (H): ICD-10-CM

## 2023-11-16 DIAGNOSIS — M54.50 LUMBAR SPINE PAIN: Primary | ICD-10-CM

## 2023-11-16 DIAGNOSIS — G06.1 ABSCESS IN EPIDURAL SPACE OF LUMBAR SPINE: ICD-10-CM

## 2023-11-16 DIAGNOSIS — M54.10 RADICULAR PAIN OF RIGHT LOWER EXTREMITY: ICD-10-CM

## 2023-11-16 DIAGNOSIS — M25.551 RIGHT HIP PAIN: ICD-10-CM

## 2023-11-16 DIAGNOSIS — Z91.148 CONTROLLED SUBSTANCE AGREEMENT TERMINATED: ICD-10-CM

## 2023-11-16 PROCEDURE — 99214 OFFICE O/P EST MOD 30 MIN: CPT | Mod: VID

## 2023-11-16 ASSESSMENT — PAIN SCALES - GENERAL: PAINLEVEL: NO PAIN (1)

## 2023-11-16 NOTE — PROGRESS NOTES
Video-Visit Details    Type of service:  Video Visit     Originating Location (pt. Location): Home    Distant Location (provider location):  On-site  Platform used for Video Visit: [facility]     Date of visit: 11/16/2023      Assessment:   Nik Nava is a 63 year old male with a past medical history significant for Grave's disease, GVHD s/p bone marrow txp, generalized muscle weakness, ALL in remission, CKD, renal cell carcinoma,  who presents with complaints of low back and RLE pain.      Low back/RLE - Onset of pain occurred early March 2023 after fall on ice, had low back and tailbone pain. He then went to ED on 3/30/23 with worsening pain and was found to have epidural space abscess, discitis, osteomyelitis. He is currently following with ED for extended antibiotic course. Of note, he has hx of ALL (in remission), s/p BMT. Lumbar MRIs on 3/30/23 and 4/28/23, in addition to acute red flag findings, noted multilevel degenerative changes. He continues to have significant low back and RLE pain that extends down lateral aspect of leg into ankle. Denies foot paraesthesias, no leg weakness, no other red flag symptoms. On exam, mild TTP in lumbosacral area, focal tenderness of left lower lumbar, no red flag findings. Etiology of pain is likely multifactorial, including osteomyelitis, epidural abscess, underlying degenerative changes of spine, with overlying myofascial component to an extent.     Assigned to [place] nursing team.     Visit Diagnoses:  1. Lumbar spine pain    2. Controlled substance agreement terminated    3. Osteomyelitis of lumbar spine (H)    4. Abscess in epidural space of lumbar spine    5. Radicular pain of right lower extremity    6. Right hip pain        Plan:  Diagnosis reviewed, treatment option addressed, and risk/benifits discussed.  Self-care instructions given.  I am recommending a multidisciplinary treatment plan to help this patient better manage their  pain.      Pain Physical Therapy:  NO   Continue activity as tolerated at home.      Pain Psychologist to address relaxation, behavioral change, coping style, and other factors important to improvement.  NO     Diagnostic Studies:  None      Medication Management:  Fentanyl - tapered off in between visits. Pain is nearly resolved.   Oxycodone - advised he could trial 10 mg at bedtime until he sees neurology on next week Monday for restless leg evaluation. He inquired about oxycodone refill in future, should he need occasionally for pain. I advised to follow up with PCP to discuss if needed. Advised he could follow up with me if pain changes and needs to discuss medication management.   Controlled substance agreement and UDS completed on 5/12/23. Agreement terminated today, as his pain has nearly resolved and has discontinued fentanyl patch and oxycodone.   Hydroxyzine - tried 20 mg at bedtime due to onset of restless legs and sleep disturbance. Was not helpful, has stopped medication.   Senna and Miralax - He is no longer on daily opioids. Recommend discontinuing use if constipation is resolved.      Potential procedures:   None      Other Orders/Referrals:   None      Follow up with PEE Fernandes CNP as needed      Review of Electronic Chart: Today I have also reviewed available medical information in the patient's medical record at [facility] ([facility]) and Care Everywhere (if available), including relevant provider notes, laboratory work, and imaging.     Akilah Shah DNP, PEE, AGNP-C  [facility]     -------------------------------------------------    Subjective:    Chief complaint:   Chief Complaint   Patient presents with    Pain       Interval history:  Nik Nava is a 63 year old male last seen on 10/19/23.  They are a patient of mine seen in follow up.     Recommendations/plan at the last visit included:  Pain Physical Therapy:  NO   Continue activity as tolerated at  home. May consider pain PT referral in future.      Pain Psychologist to address relaxation, behavioral change, coping style, and other factors important to improvement.  NO     Diagnostic Studies:  Reviewed lumbar MRI in chart - demonstrated multilevel degenerative changes, discitis, epidural space abscess and osteomyelitis.     Medication Management:  Fentanyl - Reduce to 12 mcg/hr patch every 72 hours x 12 days, then stop. Monitor for changes in pain levels and send [employer] message update. Dosage was decreased in between visits to 25 mcg/hr, he reports cognitive effects have improved. Updated prescription for #5 patches sent in to pharmacy today.  Oxycodone - continue 5-10 mg twice daily as needed for breakthrough pain. Last dose 10/7/23, using Tylenol as needed.   Naloxone - Prescription sent into pharmacy at prior visit. He reports having this on hand at home.   Controlled substance agreement and UDS completed on 5/12/23.   Apply diclofenac gel to painful joints at least 2 x day, ideally 3-4 x day. This medication works best when used consistently.   Hydroxyzine 10 mg at bedtime as needed for pain and sleep. No refills today, added to med list, has leftover home supply.   Senna and Miralax - continue use for opioid induced constipation.      Potential procedures:   Deferred - He had lumbar injection through external neurosurgery clinic that was not helpful. No injection recommendations at this point, as he is on IV antibiotics for epidural abscess.      Other Orders/Referrals:   None      Follow up with PEE eFrnandes CNP on 11/16/23 at 9:30 am via video visit    Since his last visit, Nik Nava reports:  -His pain is much improved, states it is 0-1.   -He was able to taper off fentanyl patch in between visits, oxycodone use very infrequently. He states last oxycodone for pain was weeks ago, last use a week ago trying to help with RLS.   -He started having restless leg symptoms, states he is neurology  on Monday.   -The restless legs started after stopping fentanyl patch and antibiotic and then started on new antibiotic.   -He is struggling with sleep due to restless legs, states his thyroid medication recently adjusted.   -He tried increasing lorazepam for sleep, but now he is having daytime drowsiness.   -He is wondering about medications that could help with RLS.   -He tried hydroxyzine and oxycodone at night to try to help with RLS, did not appreciate much benefit.     Pain Information:   Pain rating: averages 0-1/10 on a 0-10 scale.      Interval history from last visit on 10/19/23:  -he reports his pain level has gone considerably in the last week.   -He had reached out in between visits to reduce fentanyl patch to 25 mcg.   -He has not has taken oxycodone since 10/7/23.   -He is interested in reducing fentanyl patch even further.   -He continues to use tylenol PRN, has not needed as much.   -He had to stop Vanco in between visits due to renal function, switched to Clindo.   -He has repeat imaging coming up.   -Monday is last day of meropenem.   -He does still struggle with OIC but he is management with senna and miralax.   Pain Information:              Pain rating: averages 4/10 on a 0-10 scale.        Interval history from last visit on 9/27/23:  -He had a fall on 9/15, sustained skin tears.   -He is still on IV antibiotics.   -He is on fentanyl 50 mcg/hr patch, last use of oxycodone on Saturday.   -He tried hydrocodone for breakthrough pain, but he only used once.   -He has #82 hydrocodone tabs left, per wife.   -He has #61 tabs of oxycodone.   -He also has misc opioids they will be disposing of today in the pharmacy.   -He continues to take miralax and senna for OIC, reports this is working well.   -He reports oversedation and sleeping a lot lately, even when out at activities like his granddaughter's volleyball game.   Pain Information:              Pain rating: averages 4/10 on a 0-10 scale.         Interval history from last visit on 9/14/23:  -his pain is about the same as it was at last visit.   -He was in hospital again due to pain from epidural abscess infection.   -He continues to struggle with pain overnight.   -Per wife we took tylenol and muscle relaxant overnight and this helped.   -He uses hydroxyzine PRN as well for sleep.   -Fentanyl patch was increased to 50 mcg/hr with recent hospital visit.   -He continues to take oxycodone, current dosage 10 mg BID for breakthrough pain, total 20 mg/day.  -He states he does not like side effects with oxycodone, wondering about different opioid.   -He is open to trial hydrocodone  -He may be interested medical cannabis to help reduce opioid usage, should pain persist beyond infection tx.   -He is using miralax to help with constipation.  Pain Information:              Pain rating: averages 5/10 on a 0-10 scale.        Interval history from last visit on 8/31/23:  -his pain is worse than it was at last visit.   -He was inpatient at [facility], epidural infection recurrence.   -He had tapered off Belbuca in between visits at the recommendation of neurosurgery.   -He started gabapentin 300 mg TID, but he did not appreciate analgesic benefit.   -He was started on fentanyl patch in hospital, gabapentin discontinued due to lack of benefit.   -Current fentanyl dosage 25 mcg/hr patch, increased and changed yesterday, so not at therapeutic level quite yet.   -He continues oxycodone for breal through pain.   -Surgery not recommended at this point.   -He took hydroxyzine for pain and sleep in the spring with prior injection.   -He is on IV antibiotics now at home.   -He had LESI with neurosurgery that was not helpful at all.   Pain Information:              Pain rating: averages 5/10 on a 0-10 scale.        Interval history from last visit on 7/10/23:  -He saw neurosurgeon since last visit, who advised him to taper off Belbuca and oxycodone.   -He has been working on reducing  "dosage on his own, down to Belbuca 150 mcg BID.   -He states he is having injection on Wednesday with neurosurgery.   -He is going to wait until after injection to decide how to proceed with surgery and possibly medications.   -His last dose of oxycodone was 4-5 days ago.   -He may be interested in resuming Belbuca, pending outcome from injection.   -He is not sure if he wants to pursue surgery.   -He reports surgeon recommended possibly gabapentin to help with nerve pain.   -He is interested in possibly exploring this, pending outcome from injection.   Pain Information:              Pain rating: averages 3/10 on a 0-10 scale.        Interval history from last visit on 6/15/23:  -his pain is about the same as it was at last visit.   -He notes he has been more active recently.   -Belbuca was increased at last visit to 300 mcg, has not appreciated improvement in pain.   -He notes his increased activity is impacting pain levels.   -He uses oxycodone 5 mg 1-2 x day PRN for breakthrough pain.   -He does not like to use oxycodone due to side effects.   -He has upcoming appointment with neurosurgery, states they will be doing another MRI.   -He states   -He has some mouth irritation from buccal film.   -No side effects   Pain Information:              Pain rating: averages 5/10 on a 0-10 scale.        Interval history from last visit on 5/24/23:  -his pain is about the same as it was at last visit.   -He was started on Belbuca 150 mcg BID at last visit.   -He was previously taking Oxycontin BID that caused marked sedation.   -He states things are \"okay\" since starting Belbuca, but he is having some oral irritation.   -He has hx of GVHD in his mouth, no open sores but he is concerned about this possibility.  -He has not appreciated significant benefit for pain with Belbuca 150 mcg BID.   -He has noticed improvement in mental clarity.  -No other side effects aside from mild oral irritation.   -He was given oxycodone 5 mg #20 " "tabs at last visit for breakthrough pain, reports he has only used 1 tablet of this prescription.   -He uses diclofenac gel on low back, right hip and RLE.   -He requests refill for diclofenac gel.   -He finishes long term abx tomorrow.   -He will be following up with Infectious Disease and neurosurgery soon.   -His UDS was positive for tramadol, which he was given prior. Denies tramadol use since last visit.   -We discussed [facility] drug drop boxes for old medications.   -His wife Lamar is present for the visit today.   Pain Information:              Pain rating: averages 2-3/10 on a 0-10 scale.           HPI from initial visit with me on 5/12/23:  Nik Nava is a 63 year old male presents with a chief complaint of low back and right hip pain, .      The pain has been present for 2+ months .    The pain is Extreme Pain (9) in severity.    The pain is described as throbbing in low back and thigh, \"zingers\" down the leg.   The pain is alleviated by meds (oxycontin), rest/lying down.    It is exacerbated by prolonged sitting and walking, driving.    Modalities that have been utilized in the past which were helpful include oxycodone while inpatient.    Things that were not helpful, but tried ,include tramadol while inpatient, .    The patient has never tried pain PT, injections (cannot do right now due to osteomyelitis).  The patient otherwise denies bowel or bladder incontinence, parasthesias, weakness, saddle anesthesia, unintentional weight loss, or fever/chills/sweats.   Nik Nava has not been seen at a pain clinic in the past.  He had inpatient consult while hospitalized 3/30 ([facility])  -He had fall on ice 3/5/23, tailbone pain initially, then that has improved but continues to have low back and leg pain.   -He went to ED and admitted to hospital ([facility]) for osetomyelitis in the low back.   -He had pain team consult while inpatient, was started on oxycodone, tramadol, and hydroxyzine.   -Denies paraesthesias " in feet.   -Denies weakness.   -He follows with Inf Dis, still on antibiotics.   -He finds MS contin helpful but he has significant sedation.   -His wife notes improvement in functioning after starting Oxycontin.   -He has home PT right now, chiropractor in the past.   -Oxycodone 5 mg at bedtime PRN prior to fall/infection  -Daughter Luis Miguel is present for visit, wife Lamar on the phone.            Current Pain Treatments:     Medications:                             Tylenol 1000 mg PRN      Current MME: 0     Review of [place] Prescription Monitoring Program (): No concern for abuse or misuse of controlled medications based on this report. Reviewed - appears appropriate.      Annual Controlled Substance Agreement/UDS due date: Completed on 5/12/23, terminated today as he is no longer taking opioids.      Past pain treatments:  Surgery  Belbuca 150 mcg BID   Fentanyl 25 mcg/hr patch q72h  Oxycodone 5 mg BID PRN     Medications:  Current Outpatient Medications   Medication Sig Dispense Refill    acetaminophen (TYLENOL) 500 MG tablet       amLODIPine (NORVASC) 5 MG tablet Take 1 tablet (5 mg) by mouth daily 30 tablet 6    amoxicillin-clavulanate (AUGMENTIN) 875-125 MG tablet Take 1 tablet by mouth 2 times daily      Carboxymethylcell-Glycerin PF (REFRESH RELIEVA PF) 0.5-1 % SOLN Apply 1 drop to eye 4 times daily Preservative free. Either PF multidose bottle or PF vials 30 each 11    Doxycycline Hyclate 100 MG TBEC Take by mouth 2 times daily as needed      escitalopram (LEXAPRO) 10 MG tablet Take 1 tablet (10 mg) by mouth daily 30 tablet 3    fluorometholone (FML LIQUIFILM) 0.1 % ophthalmic suspension Place 1 drop into both eyes daily 5 mL 3    hydrOXYzine (ATARAX) 10 MG tablet Take 10 mg at bedtime PRN for pain and sleep      levothyroxine (SYNTHROID/LEVOTHROID) 125 MCG tablet Take 1 tablet (125 mcg) by mouth daily 90 tablet 4    LORazepam (ATIVAN) 1 MG tablet Take 2 tablets (2 mg) by mouth nightly as needed for  anxiety or sleep 30 tablet 3    methocarbamol (ROBAXIN) 750 MG tablet Take 750 mg by mouth      nicotine polacrilex (NICORETTE) 4 MG gum Take 4 mg by mouth as needed for smoking cessation       omeprazole (PRILOSEC) 40 MG DR capsule Take 1 capsule (40 mg) by mouth daily 30 capsule 3    oxyCODONE (ROXICODONE) 5 MG tablet Take 1-2 tablets (5-10 mg) by mouth 2 times daily as needed for pain (max 4 tabs/day) Fill 8/23/2023 start 8/24/2023. 120 tablet 0    predniSONE (DELTASONE) 1 MG tablet Take 4 tablets (4 mg) by mouth daily Take on even days 120 tablet 0    predniSONE (DELTASONE) 5 MG tablet Take 2 tablets (10 mg) by mouth every other day Take on odd days 60 tablet 3    rosuvastatin (CRESTOR) 5 MG tablet Take 1 tablet (5 mg) by mouth daily 30 tablet 3    sennosides (SENOKOT) 8.6 MG tablet Take 1 tablet by mouth 3 times daily as needed for constipation 90 tablet 0    sodium chloride 0.9 % neb solution 3 mLs by Other route 2 times daily 300 mL 11    acyclovir (ZOVIRAX) 800 MG tablet Take 1 tablet (800 mg) by mouth 2 times daily 60 tablet 4    dexamethasone alcohol-free (DECADRON) 0.1 MG/ML solution Swish and spit 20 mLs (2 mg) in mouth 4 times daily (Patient not taking: Reported on 11/14/2023) 1000 mL 3    Omega-3 Fish Oil 500 MG capsule Take 2 capsules (1,000 mg) by mouth daily (Patient not taking: Reported on 11/14/2023) 120 capsule 3       Medical History: any changes in medical history since they were last seen? No      Objective:    Physical Exam:  There were no vitals taken for this visit.  GENERAL: Healthy, alert and no distress  EYES: Eyes grossly normal to inspection.  No discharge or erythema, or obvious scleral/conjunctival abnormalities.  RESP: No audible wheeze, cough, or visible cyanosis.  No visible retractions or increased work of breathing.    SKIN: Visible skin clear. No significant rash, abnormal pigmentation or lesions.  NEURO: Cranial nerves grossly intact.  Mentation and speech appropriate for  age.  PSYCH: Mentation appears normal, affect normal/bright, judgement and insight intact, normal speech and appearance well-groomed.    Diagnostic Tests/Imaging/Labs:  None     BILLING TIME DOCUMENTATION:   The total TIME spent on this patient on the date of the encounter/appointment was 31 minutes.      TOTAL TIME includes:   Time spent preparing to see the patient (reviewing records and tests)   Time spent face to face (or over the phone) with the patient   Time spent ordering tests, medications, procedures and referrals   Time spent Referring and communicating with other healthcare professionals   Time spent documenting clinical information in Epic

## 2023-11-16 NOTE — PROGRESS NOTES
Nik is a 63 year old who is being evaluated via a billable video visit.      How would you like to obtain your AVS? MyChart  If the video visit is dropped, the invitation should be resent by: Text to cell phone: [phone]  Will anyone else be joining your video visit? Yes, Wife, Lamar  Is Pt currently in [place]? Yes    Renate Streeter MA      NOTE:  If Pt is not in [place], Appointment needs to be canceled and rescheduled.

## 2023-11-16 NOTE — PATIENT INSTRUCTIONS
Pain Physical Therapy:  NO   Continue activity as tolerated at home.      Pain Psychologist to address relaxation, behavioral change, coping style, and other factors important to improvement.  NO     Diagnostic Studies:  None      Medication Management:  Fentanyl - tapered off in between visits. Pain is nearly resolved.   Oxycodone - advised he could trial 10 mg at bedtime until he sees neurology on next week Monday for restless leg evaluation. He inquired about oxycodone refill in future, should he need occasionally for pain. I advised to follow up with PCP to discuss if needed. Advised he could follow up with me if pain changes and needs to discuss medication management.   Controlled substance agreement and UDS completed on 5/12/23. Agreement terminated today, as his pain has nearly resolved and has discontinued fentanyl patch and oxycodone.   Hydroxyzine - tried 20 mg at bedtime due to onset of restless legs and sleep disturbance. Was not helpful, has stopped medication.   Senna and Miralax - He is no longer on daily opioids. Recommend discontinuing use if constipation is resolved.      Potential procedures:   None      Other Orders/Referrals:   None      Follow up with PEE Fernandes CNP as needed    ----------------------------------------------------------------  Clinic Number:  [phone]   Call with any questions about your care and for scheduling assistance.   Calls are returned Monday through Friday between 8 AM and 4:30 PM. We usually get back to you within 2 business days depending on the issue/request.    If we are prescribing your medications:  For opioid medication refills, call the clinic or send a [employer] message 7 days in advance.  Please include:  Name of requested medication  Name of the pharmacy.  For non-opioid medications, call your pharmacy directly to request a refill. Please allow 3-4 days to be processed.   Per [place] State Law:  All controlled substance prescriptions must be filled  within 30 days of being written.    For those controlled substances allowing refills, pickup must occur within 30 days of last fill.      We believe regular attendance is key to your success in our program!    Any time you are unable to keep your appointment we ask that you call us at least 24 hours in advance to cancel.This will allow us to offer the appointment time to another patient.   Multiple missed appointments may lead to dismissal from the clinic.

## 2023-11-17 ENCOUNTER — TELEPHONE (OUTPATIENT)
Dept: NEPHROLOGY | Facility: CLINIC | Age: 63
End: 2023-11-17
Payer: MEDICARE

## 2023-11-20 ENCOUNTER — TELEPHONE (OUTPATIENT)
Dept: TRANSPLANT | Facility: CLINIC | Age: 63
End: 2023-11-20

## 2023-11-20 RX ORDER — PENTAMIDINE ISETHIONATE 300 MG/300MG
300 INHALANT RESPIRATORY (INHALATION)
Status: CANCELLED | OUTPATIENT
Start: 2023-11-20 | End: 2023-11-20

## 2023-11-20 RX ORDER — ALBUTEROL SULFATE 0.83 MG/ML
2.5 SOLUTION RESPIRATORY (INHALATION)
Status: CANCELLED | OUTPATIENT
Start: 2023-11-20 | End: 2023-11-20

## 2023-11-20 NOTE — TELEPHONE ENCOUNTER
"Oncology Nurse Triage - Reporting Symptoms  Situation:   BMT symptom call (Nik had 7 vaccinations last week and is not experiencing viral symptoms and has a resting heart rate of 100. Lamar ( wife) called and was concerned, please call to triage.)   Return call Lamar, spouse, and Nik.     Background:   Treating Provider:   Dr. Avila Tobar     Date of last office visit: 11/14/23    Recent treatments: No    Assessment  Had a cold prior to recent appt on 11/14, said he was \"right at the tail end of it\", felt good the day of appt. Had 7 immunizations. Then by Thursday 11/16/23, cold symptoms returned. Over weekend, HR has been 100-107 at rest. Today, HR sitting was 102. Energy is poor, spends a lot of time of on couch. Spouse reports normal HR is around 70. When in clinic on 11/14/23, HR was 61.   He reports he nasal cavity is congestion- clear mucus, cough-productive, unsure of color of mucus.   Denies fever, SOB, chest pain, sore throat, new rashes, signs of bleeding, changes in appetite or fluid intake. Drinking about 60 oz fluids/day. He is taking NyQuil at night and DayQuil during the day for cold.   BP today 119/83.    He continues on Doxycyline and Augmentin abx.   COVID tests at home x2 have been negative.    Per notes, pt received the following vaccines on 11/14/23:   COVID  Pedirix  Prevnar  Shingrix  HIB    Recommendations:   Writer informed lower energy is common after vaccines, especially COVID booster, reviewed with pt the pseudoephedrine in Dayquil does have the side effect of slow or fast HR, which could be contributing, informed will page on call provider just to be sure and call him back with recommendations. Pt voiced understanding.   [phone] paged BMT GIRISH.   [id number] return call from GIRISH Yap, who advised pt back off on DayQuil, continue w/ NyQuil, could try Flonase for sinus congestion. If symptoms do not resolve in 4-5d, should reach out to primary care or , if no luck can contact BMT clinic to be seen. "     0821 return call to pt and spouse, reviewed recommendations above. Pt states last time he took Dayquil was 4pm yesterday. He states he will hold off on taking this. He denies diarrhea. Writer encouraged rest and push fluids, call if fever/changes in sputum/or SOB develops.     Encounter routed to Lashanda, care coordinator, and Dr. Avila Tobar.

## 2023-11-27 ENCOUNTER — LAB (OUTPATIENT)
Dept: ONCOLOGY | Facility: CLINIC | Age: 63
End: 2023-11-27
Payer: COMMERCIAL

## 2023-11-27 DIAGNOSIS — R79.89 LOW SERUM CORTISOL LEVEL: ICD-10-CM

## 2023-11-27 DIAGNOSIS — Z86.39 HISTORY OF GRAVES' DISEASE: ICD-10-CM

## 2023-11-27 DIAGNOSIS — E89.0 POSTABLATIVE HYPOTHYROIDISM: ICD-10-CM

## 2023-11-27 DIAGNOSIS — N17.9 AKI (ACUTE KIDNEY INJURY) (H): ICD-10-CM

## 2023-11-27 DIAGNOSIS — Z94.81 STATUS POST BONE MARROW TRANSPLANT (H): Primary | ICD-10-CM

## 2023-11-27 DIAGNOSIS — C91.01 ACUTE LYMPHOBLASTIC LEUKEMIA (ALL) IN REMISSION (H): Primary | ICD-10-CM

## 2023-11-27 LAB
ALBUMIN MFR UR ELPH: 64.6 MG/DL
ALBUMIN SERPL BCG-MCNC: 4.2 G/DL (ref 3.5–5.2)
ALBUMIN UR-MCNC: 70 MG/DL
ANION GAP SERPL CALCULATED.3IONS-SCNC: 11 MMOL/L (ref 7–15)
APPEARANCE UR: CLEAR
BACTERIA #/AREA URNS HPF: ABNORMAL /HPF
BASOPHILS # BLD AUTO: 0 10E3/UL (ref 0–0.2)
BASOPHILS NFR BLD AUTO: 0 %
BILIRUB UR QL STRIP: NEGATIVE
BUN SERPL-MCNC: 34.9 MG/DL (ref 8–23)
CALCIUM SERPL-MCNC: 10 MG/DL (ref 8.8–10.2)
CHLORIDE SERPL-SCNC: 103 MMOL/L (ref 98–107)
COLOR UR AUTO: YELLOW
CORTIS SERPL-MCNC: 1.4 UG/DL
CREAT SERPL-MCNC: 1.17 MG/DL (ref 0.67–1.17)
CREAT UR-MCNC: 189 MG/DL
DEPRECATED HCO3 PLAS-SCNC: 25 MMOL/L (ref 22–29)
EGFRCR SERPLBLD CKD-EPI 2021: 69 ML/MIN/1.73M2
EOSINOPHIL # BLD AUTO: 0.1 10E3/UL (ref 0–0.7)
EOSINOPHIL NFR BLD AUTO: 1 %
ERYTHROCYTE [DISTWIDTH] IN BLOOD BY AUTOMATED COUNT: 18.4 % (ref 10–15)
GLUCOSE SERPL-MCNC: 111 MG/DL (ref 70–99)
GLUCOSE UR STRIP-MCNC: NEGATIVE MG/DL
HCT VFR BLD AUTO: 32.8 % (ref 40–53)
HGB BLD-MCNC: 11 G/DL (ref 13.3–17.7)
HGB UR QL STRIP: NEGATIVE
HYALINE CASTS #/AREA URNS LPF: ABNORMAL /LPF
IMM GRANULOCYTES # BLD: 0.1 10E3/UL
IMM GRANULOCYTES NFR BLD: 1 %
KETONES UR STRIP-MCNC: NEGATIVE MG/DL
LEUKOCYTE ESTERASE UR QL STRIP: NEGATIVE
LYMPHOCYTES # BLD AUTO: 3.7 10E3/UL (ref 0.8–5.3)
LYMPHOCYTES NFR BLD AUTO: 39 %
MCH RBC QN AUTO: 32.8 PG (ref 26.5–33)
MCHC RBC AUTO-ENTMCNC: 33.5 G/DL (ref 31.5–36.5)
MCV RBC AUTO: 98 FL (ref 78–100)
MONOCYTES # BLD AUTO: 0.9 10E3/UL (ref 0–1.3)
MONOCYTES NFR BLD AUTO: 10 %
MUCOUS THREADS #/AREA URNS LPF: PRESENT /LPF
NEUTROPHILS # BLD AUTO: 4.8 10E3/UL (ref 1.6–8.3)
NEUTROPHILS NFR BLD AUTO: 49 %
NITRATE UR QL: NEGATIVE
NRBC # BLD AUTO: 0 10E3/UL
NRBC BLD AUTO-RTO: 0 /100
PH UR STRIP: 6 [PH] (ref 5–7)
PHOSPHATE SERPL-MCNC: 3.3 MG/DL (ref 2.5–4.5)
PLATELET # BLD AUTO: 166 10E3/UL (ref 150–450)
POTASSIUM SERPL-SCNC: 4.1 MMOL/L (ref 3.4–5.3)
PROT/CREAT 24H UR: 0.34 MG/MG CR (ref 0–0.2)
PTH-INTACT SERPL-MCNC: 54 PG/ML (ref 15–65)
RBC # BLD AUTO: 3.35 10E6/UL (ref 4.4–5.9)
RBC #/AREA URNS AUTO: ABNORMAL /HPF
SKIP: ABNORMAL
SODIUM SERPL-SCNC: 139 MMOL/L (ref 135–145)
SP GR UR STRIP: 1.02 (ref 1–1.03)
T3 SERPL-MCNC: 88 NG/DL (ref 85–202)
T4 FREE SERPL-MCNC: 1.37 NG/DL (ref 0.9–1.7)
TSH SERPL DL<=0.005 MIU/L-ACNC: 0.89 UIU/ML (ref 0.3–4.2)
UROBILINOGEN UR STRIP-MCNC: NORMAL MG/DL
VIT D+METAB SERPL-MCNC: 59 NG/ML (ref 20–50)
WBC # BLD AUTO: 9.7 10E3/UL (ref 4–11)
WBC #/AREA URNS AUTO: ABNORMAL /HPF

## 2023-11-27 PROCEDURE — 85025 COMPLETE CBC W/AUTO DIFF WBC: CPT

## 2023-11-27 PROCEDURE — 81001 URINALYSIS AUTO W/SCOPE: CPT

## 2023-11-27 PROCEDURE — 80069 RENAL FUNCTION PANEL: CPT

## 2023-11-27 PROCEDURE — 82306 VITAMIN D 25 HYDROXY: CPT

## 2023-11-27 PROCEDURE — 84156 ASSAY OF PROTEIN URINE: CPT

## 2023-11-27 PROCEDURE — 82533 TOTAL CORTISOL: CPT

## 2023-11-27 PROCEDURE — 36591 DRAW BLOOD OFF VENOUS DEVICE: CPT

## 2023-11-27 PROCEDURE — 83970 ASSAY OF PARATHORMONE: CPT

## 2023-11-27 RX ORDER — HEPARIN SODIUM (PORCINE) LOCK FLUSH IV SOLN 100 UNIT/ML 100 UNIT/ML
500 SOLUTION INTRAVENOUS ONCE
Status: COMPLETED | OUTPATIENT
Start: 2023-11-27 | End: 2023-11-27

## 2023-11-27 RX ADMIN — HEPARIN SODIUM (PORCINE) LOCK FLUSH IV SOLN 100 UNIT/ML 500 UNITS: 100 SOLUTION at 07:55

## 2023-11-27 NOTE — PROGRESS NOTES
Port accessed with no incident. Blood return noted. Port flushed with saline and heparin. Patient tolerated port flush well. Port was de-accessed. Please refer to flow sheets for more information.  Beth Marley RN on 11/27/2023 at 8:17 AM

## 2023-11-28 DIAGNOSIS — E89.0 POSTABLATIVE HYPOTHYROIDISM: Primary | ICD-10-CM

## 2023-11-30 ENCOUNTER — OFFICE VISIT (OUTPATIENT)
Dept: OPHTHALMOLOGY | Facility: CLINIC | Age: 63
End: 2023-11-30
Payer: COMMERCIAL

## 2023-11-30 DIAGNOSIS — H04.129 DRY EYE: Primary | ICD-10-CM

## 2023-11-30 DIAGNOSIS — D89.813 GVHD AS COMPLICATION OF BONE MARROW TRANSPLANT (H): ICD-10-CM

## 2023-11-30 DIAGNOSIS — C91.01 ACUTE LYMPHOBLASTIC LEUKEMIA (ALL) IN REMISSION (H): Primary | ICD-10-CM

## 2023-11-30 DIAGNOSIS — H25.13 NUCLEAR AGE-RELATED CATARACT, BOTH EYES: ICD-10-CM

## 2023-11-30 DIAGNOSIS — T86.09 GVHD AS COMPLICATION OF BONE MARROW TRANSPLANT (H): ICD-10-CM

## 2023-11-30 PROCEDURE — 94642 AEROSOL INHALATION TREATMENT: CPT | Performed by: INTERNAL MEDICINE

## 2023-11-30 PROCEDURE — 94640 AIRWAY INHALATION TREATMENT: CPT | Performed by: INTERNAL MEDICINE

## 2023-11-30 PROCEDURE — 99213 OFFICE O/P EST LOW 20 MIN: CPT | Performed by: OPTOMETRIST

## 2023-11-30 RX ORDER — POLYETHYLENE GLYCOL 3350 17 G/17G
1 POWDER, FOR SOLUTION ORAL PRN
COMMUNITY
Start: 2023-11-30 | End: 2024-05-09

## 2023-11-30 RX ORDER — BENZONATATE 200 MG/1
200 CAPSULE ORAL
COMMUNITY
Start: 2023-11-28 | End: 2023-12-05

## 2023-11-30 RX ORDER — ALBUTEROL SULFATE 0.83 MG/ML
2.5 SOLUTION RESPIRATORY (INHALATION)
Status: CANCELLED | OUTPATIENT
Start: 2024-01-14 | End: 2024-01-14

## 2023-11-30 RX ORDER — HYDROXYZINE PAMOATE 25 MG/1
25 CAPSULE ORAL PRN
COMMUNITY
Start: 2023-09-06 | End: 2024-05-09

## 2023-11-30 RX ORDER — ALBUTEROL SULFATE 90 UG/1
1-2 AEROSOL, METERED RESPIRATORY (INHALATION)
COMMUNITY
Start: 2023-11-28 | End: 2024-01-08

## 2023-11-30 RX ORDER — ATORVASTATIN CALCIUM 20 MG/1
20 TABLET, FILM COATED ORAL EVERY EVENING
COMMUNITY
End: 2023-12-14

## 2023-11-30 RX ORDER — PENTAMIDINE ISETHIONATE 300 MG/300MG
300 INHALANT RESPIRATORY (INHALATION)
Status: CANCELLED | OUTPATIENT
Start: 2024-01-14 | End: 2024-01-14

## 2023-11-30 RX ORDER — PENTAMIDINE ISETHIONATE 300 MG/300MG
300 INHALANT RESPIRATORY (INHALATION)
Status: COMPLETED | OUTPATIENT
Start: 2023-11-30 | End: 2023-11-30

## 2023-11-30 RX ORDER — FLUTICASONE PROPIONATE 50 MCG
2 SPRAY, SUSPENSION (ML) NASAL PRN
COMMUNITY
Start: 2023-11-28 | End: 2024-01-08

## 2023-11-30 RX ADMIN — PENTAMIDINE ISETHIONATE 300 MG: 300 INHALANT RESPIRATORY (INHALATION) at 15:47

## 2023-11-30 ASSESSMENT — CONF VISUAL FIELD
OD_SUPERIOR_NASAL_RESTRICTION: 0
OS_INFERIOR_TEMPORAL_RESTRICTION: 0
OS_SUPERIOR_TEMPORAL_RESTRICTION: 0
OD_SUPERIOR_TEMPORAL_RESTRICTION: 0
OD_INFERIOR_NASAL_RESTRICTION: 0
OS_INFERIOR_NASAL_RESTRICTION: 0
METHOD: COUNTING FINGERS
OS_SUPERIOR_NASAL_RESTRICTION: 0
OD_INFERIOR_TEMPORAL_RESTRICTION: 0
OD_NORMAL: 1
OS_NORMAL: 1

## 2023-11-30 ASSESSMENT — REFRACTION_CURRENTRX
OS_BASECURVE: 7.9
OS_BRAND: ONEFIT
OD_BRAND: ONEFIT
OD_ADDL_SPECS: OPT EXTRA CLEAR, HYDRAPEG
OD_BASECURVE: 7.9
OS_SPHERE: -0.50
OS_ADDL_SPECS: OPT EXTRA BLUE, HYDRAPEG
OD_SPHERE: -0.75
OS_DIAMETER: 14.9
OD_DIAMETER: 14.9

## 2023-11-30 ASSESSMENT — VISUAL ACUITY
OD_CC+: +1
METHOD: SNELLEN - LINEAR
OS_CC: 20/50
OS_PH_CC+: -2
OD_CC: 20/50
OS_CC+: -3
CORRECTION_TYPE: CONTACTS
OS_PH_CC: 20/25

## 2023-11-30 ASSESSMENT — TONOMETRY
IOP_METHOD: ICARE
OS_IOP_MMHG: 13
OD_IOP_MMHG: 13

## 2023-11-30 ASSESSMENT — REFRACTION_WEARINGRX
OS_SPHERE: +3.00
OD_SPHERE: +3.00
SPECS_TYPE: OTC READERS
OS_CYLINDER: SPHERE
OD_CYLINDER: SPHERE

## 2023-11-30 NOTE — NURSING NOTE
Chief Complaints and History of Present Illnesses   Patient presents with    Follow Up     Chief Complaint(s) and History of Present Illness(es)       Follow Up              Laterality: both eyes    Associated symptoms: Negative for eye pain, headache, fatigue and floaters    Pain scale: 0/10              Comments    Patient states he cannot see very well but thinks it's his cataracts. Patient states lenses are comfortable. Patient states dryness issues are gone when lenses are in.   Fay Polk, BOGDAN November 30, 2023 2:21 PM

## 2023-11-30 NOTE — PROGRESS NOTES
Nik HARRY Pastranacolt was seen today for a Pentamidine nebulizer tx ordered by Dr. Avila Tobar.    Patient was first given 4 puffs of albuterol MDI, after which Pentamidine 300 mg (Lot # [id number]) mixed with 6cc Sterile H20 was administered through a filtered nebulizer.    Pre-treatment: SpO2 = 97%   HR = 84   BBS = Clear   Post-treatment: SpO2 = 97%  HR = 86  BBS = Clear    No adverse side effects noted by the patient.    This service today was provided under Dr. Willis, who was available if needed.     Procedure was completed by Chapo Geller.

## 2023-12-01 ENCOUNTER — ANCILLARY PROCEDURE (OUTPATIENT)
Dept: CT IMAGING | Facility: CLINIC | Age: 63
End: 2023-12-01
Attending: INTERNAL MEDICINE
Payer: COMMERCIAL

## 2023-12-01 DIAGNOSIS — Z94.81 STATUS POST BONE MARROW TRANSPLANT (H): ICD-10-CM

## 2023-12-01 PROCEDURE — 71250 CT THORAX DX C-: CPT | Performed by: RADIOLOGY

## 2023-12-01 ASSESSMENT — SLIT LAMP EXAM - LIDS
COMMENTS: 1+ BLEPH, THICKENED MARGINS, 1-2+ MGD
COMMENTS: 1+ BLEPH, THICKENED MARGINS, 1-2+ MGD

## 2023-12-01 NOTE — PROGRESS NOTES
A/P  1.) Dry Eye OU  -s/p BMT 08/2021. Dryness right eye>left eye, symptomatic for photophobia/tearing  -Failed: Restasis/Xiidra (stinging), plugs (scarred puncta), BCL (retention)  -Chronic oral steroid use  -Now excellent response to scleral lens - corneal staining resolved OU. Much improved ocular comfort.   -Wife helping with I&R. Pt on oral steroids causing hands to shake, unable to do himself.  -Continue PFAT and once daily FML - though now that he is stable in CL's we may consider discontinuing. However I would leave him on it pending upcoming surgery to keep ocular surface inflammation down. IOP stable    2.) Cataracts OU  -Progressed by chronic steroid use  -Visually significant, BCVA 20/50+ right and left eye. Slight myopic shift in CL's but without significant improvement  -He is meeting with Dr. Menezes next week for CE/IOL consult. Reviewed expectations regarding new CL power after surgery and timeline for refitting. Should contact me to schedule pending surgery date    No lens changes recommended. Ocular surface stable. Rec CE/IOL OU and I will see him after prn for CL refit/OR      I have confirmed the patient's CC, HPI and reviewed Past Medical History, Past Surgical History, Social History, Family History, Problem List, Medication List and agree with Tech note.     Aisha Juarez, OD [facility]     Physical Therapy      Patient Name:  Quincy Triplett   MRN:  [id number]    Spoke to RN this am and reported pt still in holding area after procedure. Will hold PT today and f/u tomorrow as schedule allows.

## 2023-12-05 ENCOUNTER — OFFICE VISIT (OUTPATIENT)
Dept: OPHTHALMOLOGY | Facility: CLINIC | Age: 63
End: 2023-12-05
Attending: OPHTHALMOLOGY
Payer: COMMERCIAL

## 2023-12-05 DIAGNOSIS — H04.129 DRY EYE: ICD-10-CM

## 2023-12-05 DIAGNOSIS — H25.813 COMBINED FORMS OF AGE-RELATED CATARACT OF BOTH EYES: Primary | ICD-10-CM

## 2023-12-05 DIAGNOSIS — H16.123 FILAMENTARY KERATITIS OF BOTH EYES: ICD-10-CM

## 2023-12-05 DIAGNOSIS — T86.09 GVHD AS COMPLICATION OF BONE MARROW TRANSPLANT (H): ICD-10-CM

## 2023-12-05 DIAGNOSIS — D89.813 GVHD AS COMPLICATION OF BONE MARROW TRANSPLANT (H): ICD-10-CM

## 2023-12-05 PROCEDURE — 92025 CPTRIZED CORNEAL TOPOGRAPHY: CPT | Performed by: OPHTHALMOLOGY

## 2023-12-05 PROCEDURE — 92025 CPTRIZED CORNEAL TOPOGRAPHY: CPT | Mod: 26 | Performed by: OPHTHALMOLOGY

## 2023-12-05 PROCEDURE — 99214 OFFICE O/P EST MOD 30 MIN: CPT | Performed by: OPHTHALMOLOGY

## 2023-12-05 PROCEDURE — 99207 IOL BIOMETRY W/ IOL CALC OU (BOTH EYES): CPT | Mod: 26 | Performed by: OPHTHALMOLOGY

## 2023-12-05 PROCEDURE — 76519 ECHO EXAM OF EYE: CPT | Performed by: OPHTHALMOLOGY

## 2023-12-05 PROCEDURE — 76519 ECHO EXAM OF EYE: CPT | Mod: 26 | Performed by: OPHTHALMOLOGY

## 2023-12-05 PROCEDURE — 99204 OFFICE O/P NEW MOD 45 MIN: CPT | Mod: GC | Performed by: OPHTHALMOLOGY

## 2023-12-05 ASSESSMENT — CONF VISUAL FIELD
METHOD: COUNTING FINGERS
OD_SUPERIOR_NASAL_RESTRICTION: 0
OS_NORMAL: 1
OD_SUPERIOR_TEMPORAL_RESTRICTION: 0
OS_INFERIOR_NASAL_RESTRICTION: 0
OS_SUPERIOR_NASAL_RESTRICTION: 0
OD_INFERIOR_NASAL_RESTRICTION: 0
OS_INFERIOR_TEMPORAL_RESTRICTION: 0
OD_INFERIOR_TEMPORAL_RESTRICTION: 0
OS_SUPERIOR_TEMPORAL_RESTRICTION: 0
OD_NORMAL: 1

## 2023-12-05 ASSESSMENT — REFRACTION_MANIFEST
OD_CYLINDER: SPHERE
OS_CYLINDER: SPHERE
OD_SPHERE: +0.50
OS_SPHERE: PLANO

## 2023-12-05 ASSESSMENT — SLIT LAMP EXAM - LIDS
COMMENTS: 1+ BLEPH, THICKENED MARGINS, 1-2+ MGD
COMMENTS: 1+ BLEPH, THICKENED MARGINS, 1-2+ MGD

## 2023-12-05 ASSESSMENT — VISUAL ACUITY
OS_CC: 20/60
OS_CC+: -1
OS_PH_CC+: +2
OD_CC+: -2
OD_CC: 20/50
METHOD_MR: DIAGNOSTIC
CORRECTION_TYPE: CONTACTS
METHOD: SNELLEN - LINEAR
OS_PH_CC: 20/50

## 2023-12-05 ASSESSMENT — EXTERNAL EXAM - LEFT EYE: OS_EXAM: DERMATOCHALASIS

## 2023-12-05 ASSESSMENT — TONOMETRY
OS_IOP_MMHG: 10
OD_IOP_MMHG: 09
IOP_METHOD: TONOPEN

## 2023-12-05 ASSESSMENT — EXTERNAL EXAM - RIGHT EYE: OD_EXAM: DERMATOCHALASIS

## 2023-12-05 ASSESSMENT — CUP TO DISC RATIO
OS_RATIO: 0.45
OD_RATIO: 0.45

## 2023-12-05 NOTE — PROGRESS NOTES
Chief Complaint(s) and History of Present Illness(es)     Consult For            Laterality: both eyes    Onset: 6 months ago    Context: night driving    Course: gradually worsening    Associated symptoms: glare, dryness and tearing (intermittent).  Negative   for eye pain    Pain scale: 0/10          Comments    He states that his vision has been gradually been getting more clouded   over the past 6 months.  He is unable to see an object/person if there is   back lighting, then he sees only a silhouette. He wears scleral lenses,   which greatly improves his dry eye symptoms.    Jenniffer Gould, COT 9:17 AM  December 5, 2023       Glare is very bothersome, especially from headlights at night, interfering with driving.  No sudden changes. Both eyes seem equally blurry.       POH:  - Dry eyes, uses scleral lenses with good relief.         Review of systems for the eyes was negative other than the pertinent positives/negatives listed in the HPI.      Assessment & Plan      Nik Nava is a 63 year old male with the following diagnoses:   1. Combined forms of age-related cataract of both eyes    2. Dry eye    3. 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 Filamentary keratitis of both eyes       # Cataracts, both eyes  Visually significant both eyes.   Special equipment/needs:  Eye: left first  Anesthesia: Topical  Dilates well  Iris expansion: not needed  Pseudoexfoliation: No  Trypan Blue: No  Trauma: No    Able lay to flat: Yes  but history of back problems.   Blood Thinner: No   Tamsulosin: Yes for short time after back surgery in the past, not currently.   DM: No  Guttae: No    Dominant Eye: right    Risks, benefits and alternatives to cataract extraction/IOL implantation discussed; consent obtained.  Will schedule surgery today       Plan:   RBB  Coral each eye, monofocal.   Suture both eyes   Increase FML and artificial tears to four times a day for now  - Proceed with CE/IOL OS first.  -  Discussed importance of dry eye treatment and limited prognosis due to dryness.   - Last wore hard contact lenses this morning, plan for contact lens holiday and return in 4-6 weeks for repeat IOL biometry and pentacam each eye.       # Dry eyes/oGVHD  - Follows with Dr. Juarez, uses scleral contact lenses with good relief.   -s/p BMT 08/2021. Dryness right eye>left eye, symptomatic for photophobia/tearing  -Failed: Restasis/Xiidra (stinging), plugs (scarred puncta), BCL (retention)  -Chronic oral steroid use  -Wife helps with lenses.          Kiran Ann MD  Ophthalmology, PGY-4    Attending Physician Attestation:  Complete documentation of historical and exam elements from today's encounter can be found in the full encounter summary report (not reduplicated in this progress note).  I personally obtained the chief complaint(s) and history of present illness.  I confirmed and edited as necessary the review of systems, past medical/surgical history, family history, social history, and examination findings as documented by others; and I examined the patient myself.  I personally reviewed the relevant tests, images, and reports as documented above.  I formulated and edited as necessary the assessment and plan and discussed the findings and management plan with the patient and family. Attending Physician Image/Tesing Attestation: I personally reviewed the ophthalmic test(s) associated with this encounter, agree with the interpretation(s) as documented by the resident/fellow, and have edited the corresponding report(s) as necessary.  Today with Nik Nava  and his wife, I reviewed the indications, risks, benefits, and alternatives of the proposed surgical procedure including, but not limited to, failure obtain the desired result  and need for additional surgery, bleeding, infection, loss of vision, loss of the eye, and the remote possibility of permanent damage to any organ system or death with the use of  anesthesia.  I provided multiple opportunities for the questions, answered all questions to the best of my ability, and confirmed that my answers and my discussion were understood.    . - Deshawn Menezes MD

## 2023-12-05 NOTE — NURSING NOTE
Chief Complaints and History of Present Illnesses   Patient presents with    Consult For     Chief Complaint(s) and History of Present Illness(es)       Consult For              Laterality: both eyes    Onset: 6 months ago    Context: night driving    Course: gradually worsening    Associated symptoms: glare, dryness and tearing (intermittent).  Negative for eye pain    Pain scale: 0/10              Comments    He states that his vision has been gradually been getting more clouded over the past 6 months.  He is unable to see an object/person if there is back lighting, then he sees only a silhouette. He wears scleral lenses, which greatly improves his dry eye symptoms.    Jenniffer Gould, COT 9:17 AM  December 5, 2023

## 2023-12-06 ENCOUNTER — HOSPITAL ENCOUNTER (OUTPATIENT)
Facility: AMBULATORY SURGERY CENTER | Age: 63
Discharge: HOME | End: 2023-12-06
Attending: OPHTHALMOLOGY
Payer: COMMERCIAL

## 2023-12-06 DIAGNOSIS — H25.813 COMBINED FORMS OF AGE-RELATED CATARACT OF BOTH EYES: Primary | ICD-10-CM

## 2023-12-06 PROBLEM — H04.129 DRY EYE: Status: ACTIVE | Noted: 2023-12-05

## 2023-12-07 NOTE — TELEPHONE ENCOUNTER
FUTURE VISIT INFORMATION      SURGERY INFORMATION:  Date: 12/15/23  Location:  or  Surgeon:  Deshawn Menezes MD   Anesthesia Type:  MAC with Retrobulbar   Procedure: PHACOEMULSIFICATION, CATARACT, WITH STANDARD INTRAOCULAR LENS IMPLANT INSERTION LEFT EYE     RECORDS REQUESTED FROM:       Primary Care Provider: Yamilet Hinojosa MD - [facility]    Most recent EKG+ Tracin23    Most recent ECHO: 22    Most recent PFT's: 23

## 2023-12-08 ENCOUNTER — TELEPHONE (OUTPATIENT)
Dept: OPHTHALMOLOGY | Facility: CLINIC | Age: 63
End: 2023-12-08
Payer: MEDICARE

## 2023-12-08 NOTE — TELEPHONE ENCOUNTER
Called patient to schedule surgery with Dr. Menezes    Date of Surgery: 12/15,1/15    Location of surgery: [facility]    Pre-Op H&P: PCP    Pre/Post Imaging:  No    Post-Op Appt Date: 12/20,2/7     Surgery Packet Mailed: yes    Additional comments: Spoke with patient, he is aware of above dates, will review packet and reach out with any questions           Anna C. Schoenecker on 12/8/2023 at 11:09 AM

## 2023-12-11 ENCOUNTER — TELEPHONE (OUTPATIENT)
Dept: OPHTHALMOLOGY | Facility: CLINIC | Age: 63
End: 2023-12-11

## 2023-12-11 DIAGNOSIS — T86.09 GVHD AS COMPLICATION OF BONE MARROW TRANSPLANT (H): ICD-10-CM

## 2023-12-11 DIAGNOSIS — D89.813 GVHD AS COMPLICATION OF BONE MARROW TRANSPLANT (H): ICD-10-CM

## 2023-12-11 DIAGNOSIS — C91.01 ACUTE LYMPHOBLASTIC LEUKEMIA (ALL) IN REMISSION (H): ICD-10-CM

## 2023-12-11 RX ORDER — PREDNISONE 5 MG/1
10 TABLET ORAL EVERY OTHER DAY
Qty: 60 TABLET | Refills: 3 | OUTPATIENT
Start: 2023-12-11

## 2023-12-11 RX ORDER — PREDNISONE 1 MG/1
4 TABLET ORAL DAILY
Qty: 120 TABLET | Refills: 3 | Status: SHIPPED | OUTPATIENT
Start: 2023-12-11 | End: 2024-05-09

## 2023-12-11 RX ORDER — PREDNISONE 5 MG/1
10 TABLET ORAL EVERY OTHER DAY
Qty: 60 TABLET | Refills: 3 | Status: SHIPPED | OUTPATIENT
Start: 2023-12-11 | End: 2024-04-24

## 2023-12-11 NOTE — TELEPHONE ENCOUNTER
[facility] Call Center    Phone Message    May a detailed message be left on voicemail: yes     Reason for Call: Other: pt advises he was doing e check in for DOS on 12/15/23 and he advises he does have a cough and was advised to contact clinic. Please contact pt to discuss. Thank you!      Action Taken: Message routed to:  [facility] ([facility] eye    Travel Screening: Not Applicable

## 2023-12-11 NOTE — TELEPHONE ENCOUNTER
The patient notes he had a virus one month ago.  He has coughing spells once in awhile.  The patient will monitor for the next couple days and if he is coughing a lot he will call us and postpone procedure.  He states he is on the mend.

## 2023-12-12 DIAGNOSIS — H04.129 DRY EYE: ICD-10-CM

## 2023-12-12 DIAGNOSIS — T86.09 GVHD AS COMPLICATION OF BONE MARROW TRANSPLANT (H): ICD-10-CM

## 2023-12-12 DIAGNOSIS — H16.123 FILAMENTARY KERATITIS OF BOTH EYES: ICD-10-CM

## 2023-12-12 DIAGNOSIS — D89.813 GVHD AS COMPLICATION OF BONE MARROW TRANSPLANT (H): ICD-10-CM

## 2023-12-13 RX ORDER — FLUOROMETHOLONE 0.1 %
1 SUSPENSION, DROPS(FINAL DOSAGE FORM)(ML) OPHTHALMIC (EYE) 4 TIMES DAILY
Qty: 10 ML | Refills: 1 | Status: SHIPPED | OUTPATIENT
Start: 2023-12-13 | End: 2024-05-09

## 2023-12-13 RX ORDER — PROPARACAINE HYDROCHLORIDE 5 MG/ML
1 SOLUTION/ DROPS OPHTHALMIC ONCE
Status: CANCELLED | OUTPATIENT
Start: 2023-12-13 | End: 2023-12-13

## 2023-12-13 RX ORDER — CYCLOPENTOLAT/TROPIC/PHENYLEPH 1%-1%-2.5%
1 DROPS (EA) OPHTHALMIC (EYE)
Status: CANCELLED | OUTPATIENT
Start: 2023-12-13

## 2023-12-13 NOTE — TELEPHONE ENCOUNTER
fluorometholone (FML LIQUIFILM) 0.1 % ophthalmic suspension   Needing a new script for 10 mL bottles.  Because that is all they have in stock right now.  Please send new order.  Med not on protocol to fill.  Thank you     Renetta Lay RN  Central Triage Red Flags/Med Refills

## 2023-12-14 ENCOUNTER — ANESTHESIA EVENT (OUTPATIENT)
Dept: SURGERY | Facility: AMBULATORY SURGERY CENTER | Age: 63
End: 2023-12-14
Payer: MEDICARE

## 2023-12-14 ENCOUNTER — VIRTUAL VISIT (OUTPATIENT)
Dept: SURGERY | Facility: CLINIC | Age: 63
End: 2023-12-14
Payer: COMMERCIAL

## 2023-12-14 ENCOUNTER — PRE VISIT (OUTPATIENT)
Dept: SURGERY | Facility: CLINIC | Age: 63
End: 2023-12-14

## 2023-12-14 DIAGNOSIS — H25.813 COMBINED FORMS OF AGE-RELATED CATARACT OF BOTH EYES: ICD-10-CM

## 2023-12-14 DIAGNOSIS — H04.129 DRY EYE: ICD-10-CM

## 2023-12-14 DIAGNOSIS — Z01.818 PREOP EXAMINATION: Primary | ICD-10-CM

## 2023-12-14 PROCEDURE — 99204 OFFICE O/P NEW MOD 45 MIN: CPT | Mod: 95

## 2023-12-14 RX ORDER — SODIUM CHLORIDE, SODIUM LACTATE, POTASSIUM CHLORIDE, CALCIUM CHLORIDE 600; 310; 30; 20 MG/100ML; MG/100ML; MG/100ML; MG/100ML
INJECTION, SOLUTION INTRAVENOUS CONTINUOUS
Status: CANCELLED | OUTPATIENT
Start: 2023-12-14

## 2023-12-14 RX ORDER — ONDANSETRON 4 MG/1
4 TABLET, ORALLY DISINTEGRATING ORAL EVERY 30 MIN PRN
Status: CANCELLED | OUTPATIENT
Start: 2023-12-14

## 2023-12-14 RX ORDER — HYDROMORPHONE HYDROCHLORIDE 1 MG/ML
0.2 INJECTION, SOLUTION INTRAMUSCULAR; INTRAVENOUS; SUBCUTANEOUS EVERY 5 MIN PRN
Status: CANCELLED | OUTPATIENT
Start: 2023-12-14

## 2023-12-14 RX ORDER — ACETAMINOPHEN 325 MG/1
975 TABLET ORAL ONCE
Status: CANCELLED | OUTPATIENT
Start: 2023-12-14 | End: 2023-12-14

## 2023-12-14 RX ORDER — HYDROMORPHONE HYDROCHLORIDE 1 MG/ML
0.4 INJECTION, SOLUTION INTRAMUSCULAR; INTRAVENOUS; SUBCUTANEOUS EVERY 5 MIN PRN
Status: CANCELLED | OUTPATIENT
Start: 2023-12-14

## 2023-12-14 RX ORDER — LIDOCAINE 40 MG/G
CREAM TOPICAL
Status: CANCELLED | OUTPATIENT
Start: 2023-12-14

## 2023-12-14 RX ORDER — OXYCODONE HYDROCHLORIDE 5 MG/1
10 TABLET ORAL
Status: CANCELLED | OUTPATIENT
Start: 2023-12-14

## 2023-12-14 RX ORDER — ONDANSETRON 2 MG/ML
4 INJECTION INTRAMUSCULAR; INTRAVENOUS EVERY 30 MIN PRN
Status: CANCELLED | OUTPATIENT
Start: 2023-12-14

## 2023-12-14 RX ORDER — FENTANYL CITRATE 50 UG/ML
25 INJECTION, SOLUTION INTRAMUSCULAR; INTRAVENOUS EVERY 5 MIN PRN
Status: CANCELLED | OUTPATIENT
Start: 2023-12-14

## 2023-12-14 RX ORDER — OXYCODONE HYDROCHLORIDE 5 MG/1
5 TABLET ORAL
Status: CANCELLED | OUTPATIENT
Start: 2023-12-14

## 2023-12-14 RX ORDER — FENTANYL CITRATE 50 UG/ML
50 INJECTION, SOLUTION INTRAMUSCULAR; INTRAVENOUS EVERY 5 MIN PRN
Status: CANCELLED | OUTPATIENT
Start: 2023-12-14

## 2023-12-14 ASSESSMENT — ENCOUNTER SYMPTOMS: SEIZURES: 0

## 2023-12-14 ASSESSMENT — LIFESTYLE VARIABLES: TOBACCO_USE: 1

## 2023-12-14 ASSESSMENT — COPD QUESTIONNAIRES: COPD: 0

## 2023-12-14 NOTE — PATIENT INSTRUCTIONS
Preparing for Your Surgery      Name:  Nik Nava   MRN:  [id number]   :  1960   Today's Date:  2023         Arriving for surgery:  Surgery date:  12/15/23  Arrival time:  10:50 am  Surgery time: 12:20 pm    Restrictions due to COVID 19:    Please maintain social distance.  Masking is optional        parking is available for anyone with mobility limitations or disabilities. (Monday- Friday 7 am- 5 pm)    Please come to:    [facility]  [address] [place] [id number]    Check in on the 5th floor, [facility].    What can I eat or drink?    -  You may eat and drink normally until 8 hours before arrival time  (Until 2:50 am on 12/15/23)  -  You may have clear liquids until 2 hours before arrival time  (Until 8:50 am on 12/15/23)    Examples of clear liquids:  Water  Clear broth  Juices (apple, white grape, white cranberry  and cider) without pulp  Noncarbonated, powder based beverages  (lemonade and Robbin-Aid)  Sodas (Sprite, 7-Up, ginger ale and seltzer)  Coffee or tea (without milk or cream)  Gatorade    --No alcohol or cannabis products for at least 24 hours before surgery    Which medicines can I take?    Hold Ibuprofen (Advil, Motrin) for 1 day before surgery--unless otherwise directed by surgeon.  Hold Naproxen (Aleve) for 4 days before surgery.    -  DO NOT take the following medications the day of surgery:   Nicotine gum   Miralax   Multivitamin     -  PLEASE TAKE the following medications the day of surgery    Acetaminophen (tylenol) as needed   Albuterol inhaler as needed   Eye drops as directed   Flonase nasal spray as needed   Levothyroxine (Synthroid)   Methocarbamol (Robaxin) as needed   Omeprazole (Prilosec)   Oxycodone as needed   Prednisone     How do I prepare myself?  - Please take 2 showers (one the night prior to surgery and one the morning of surgery) using Scrubcare or Hibiclens soap.    Use this soap only from the neck to  your toes.     Leave the soap on your skin for one minute--then rinse thoroughly.      You may use your own shampoo and conditioner; no other hair products.   - Please remove all jewelry and body piercings.  - No lotions, deodorants or fragrance.  - Bring your ID and insurance card.    -If you have a Deep Brain Stimulator, a Spinal Cord Stimulator or any implanted Neuro device you must bring the remote to the Surgery Center          ALL PATIENTS ARE REQUIRED TO HAVE A RESPONSIBLE ADULT TO DRIVE AND BE IN ATTENDANCE WITH THEM FOR 24 HOURS FOLLOWING SURGERY       Covid testing policy as of 12/06/2022  Your surgeon will notify and schedule you for a COVID test if one is needed before surgery--please direct any questions or COVID symptoms to your surgeon      Questions or Concerns:    -For questions regarding the day of surgery please contact the Ambulatory Surgery Center at [phone].    -If you have health changes between today and your surgery please contact your surgeon.     For questions after surgery please call your surgeons office.

## 2023-12-14 NOTE — H&P
Pre-Operative H & P     CC:  Preoperative exam to assess for increased cardiopulmonary risk while undergoing surgery and anesthesia.    Date of Encounter: 12/14/2023  Primary Care Physician:  Wojciech Soares     Reason for visit:   Encounter Diagnoses   Name Primary?    Preop examination Yes    Combined forms of age-related cataract of both eyes     Dry eye        HPI  Nik Nava is a 63 year old male who presents for pre-operative H & P in preparation for  Procedure Information       Case: [id number] Date/Time: 12/15/23 1220    Procedure: LEFT EYE PHACOEMULSIFICATION, CATARACT, WITH STANDARD INTRAOCULAR LENS IMPLANT INSERTION (Left: Eye)    Anesthesia type: MAC with Retrobulbar    Diagnosis:       Combined forms of age-related cataract of both eyes [H25.813]      Dry eye [H04.129]    Pre-op diagnosis:       Combined forms of age-related cataract of both eyes [H25.813]      Dry eye [H04.129]    Location: [place] [address] / [facility]    Providers: Deshawn Menezes MD            Nik Nava is a 64 y/o male with a PMH significant for former tobacco use, HTN, KISHAN, ALL s/p bone marrow transplant, 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 He consulted with Dr. Menezes on 12/5/23 and is now scheduled for the surgery listed above for further management.     History is obtained from the patient and chart review    Hx of abnormal bleeding or anti-platelet use: None.      Past Medical History  Past Medical History:   Diagnosis Date    ALL (acute lymphocytic leukemia) (H)     CKD (chronic kidney disease)     GVHD (graft versus host disease) (H)     H/O peripheral stem cell transplant (H)     Hyperthyroidism 1996    Infection due to 2019 novel coronavirus 01/16/2023    Influenza A 11/2022    Postablative hypothyroidism  1997    Pulmonary embolism (H)     Renal cell carcinoma (H) 2007    right kidney    Sleep apnea        Past Surgical History  Past Surgical History:   Procedure Laterality Date    BACK SURGERY  2017    BONE MARROW BIOPSY, BONE SPECIMEN, NEEDLE/TROCAR Left 09/02/2021    Procedure: BIOPSY, BONE MARROW;  Surgeon: Jailyn Ny APRN CNP;  Location: [facility] OR    BONE MARROW BIOPSY, BONE SPECIMEN, NEEDLE/TROCAR Left 11/15/2021    Procedure: BIOPSY, BONE MARROW;  Surgeon: Socrates De La Torre;  Location: [facility] OR    BONE MARROW BIOPSY, BONE SPECIMEN, NEEDLE/TROCAR Left 02/07/2022    Procedure: BIOPSY, BONE MARROW;  Surgeon: Renetta Wiggins PA-C;  Location: [facility] OR    BONE MARROW BIOPSY, BONE SPECIMEN, NEEDLE/TROCAR Left 08/18/2022    Procedure: BIOPSY, BONE MARROW;  Surgeon: Lorrie Yap PA-C;  Location: [facility] OR    BONE MARROW BIOPSY, BONE SPECIMEN, NEEDLE/TROCAR Left 8/7/2023    Procedure: Bone marrow biopsy, bone specimen, needle/trocar;  Surgeon: Teressa Rick PA-C;  Location: [facility] OR    BRONCHOSCOPY (RIGID OR FLEXIBLE), DIAGNOSTIC N/A 04/29/2022    Procedure: BRONCHOSCOPY, WITH BRONCHOALVEOLAR LAVAGE;  Surgeon: Murtaza Garcia MD;  Location: [facility]    IR CVC TUNNEL PLACEMENT > 5 YRS OF AGE  08/04/2021    IR CVC TUNNEL REMOVAL LEFT  09/02/2021    IR LIVER BIOPSY PERCUTANEOUS  02/21/2022    NEPHRECTOMY  2007    PERCUTANEOUS BIOPSY LIVER N/A 02/21/2022    Procedure: NEEDLE BIOPSY, LIVER, PERCUTANEOUS;  Surgeon: Trace Castellanos MD;  Location: [facility] OR       Prior to Admission Medications  Current Outpatient Medications   Medication Sig Dispense Refill    acetaminophen (TYLENOL) 500 MG tablet Take 500 mg by mouth as needed      albuterol (PROAIR HFA/PROVENTIL HFA/VENTOLIN HFA) 108 (90 Base) MCG/ACT inhaler Inhale 1-2 puffs into the lungs      amLODIPine (NORVASC) 5 MG tablet Take 1 tablet (5 mg) by mouth daily (Patient taking differently: Take 5 mg by mouth every evening) 30 tablet 6     Carboxymethylcell-Glycerin PF (REFRESH RELIEVA PF) 0.5-1 % SOLN Apply 1 drop to eye 4 times daily Preservative free. Either PF multidose bottle or PF vials 30 each 11    dexamethasone alcohol-free (DECADRON) 0.1 MG/ML solution Swish and spit 20 mLs (2 mg) in mouth 4 times daily 1000 mL 3    escitalopram (LEXAPRO) 10 MG tablet Take 1 tablet (10 mg) by mouth daily (Patient taking differently: Take 10 mg by mouth every evening) 30 tablet 3    fluorometholone (FML LIQUIFILM) 0.1 % ophthalmic suspension Place 1 drop into both eyes 4 times daily (Patient taking differently: Place 1 drop into both eyes 2 times daily) 10 mL 1    fluticasone (FLONASE) 50 MCG/ACT nasal spray Spray 2 sprays in nostril as needed      levothyroxine (SYNTHROID/LEVOTHROID) 125 MCG tablet Take 1 tablet (125 mcg) by mouth daily (Patient taking differently: Take 125 mcg by mouth every morning) 90 tablet 4    LORazepam (ATIVAN) 1 MG tablet Take 2 tablets (2 mg) by mouth nightly as needed for anxiety or sleep 30 tablet 3    methocarbamol (ROBAXIN) 750 MG tablet Take 750 mg by mouth as needed      nicotine polacrilex (NICORETTE) 4 MG gum Take 4 mg by mouth as needed for smoking cessation       Nutritional Supplements (ENSURE COMPLETE SHAKE) LIQD Take by mouth every morning      omeprazole (PRILOSEC) 40 MG DR capsule Take 1 capsule (40 mg) by mouth daily (Patient taking differently: Take 40 mg by mouth every morning) 30 capsule 3    oxyCODONE (ROXICODONE) 5 MG tablet Take 1-2 tablets (5-10 mg) by mouth 2 times daily as needed for pain (max 4 tabs/day) Fill 8/23/2023 start 8/24/2023. 120 tablet 0    polyethylene glycol (MIRALAX) 17 GM/Dose powder Take 1 Capful by mouth as needed      predniSONE (DELTASONE) 1 MG tablet Take 4 tablets (4 mg) by mouth daily Take on even days (Patient taking differently: Take 4 mg by mouth every morning Take on even days) 120 tablet 3    predniSONE (DELTASONE) 5 MG tablet Take 2 tablets (10 mg) by mouth every other day Take  on odd days 60 tablet 3    rosuvastatin (CRESTOR) 5 MG tablet Take 1 tablet (5 mg) by mouth daily (Patient taking differently: Take 5 mg by mouth every evening) 30 tablet 3    sennosides (SENOKOT) 8.6 MG tablet Take 1 tablet by mouth 3 times daily as needed for constipation (Patient taking differently: Take 1 tablet by mouth every evening) 90 tablet 0    sodium chloride 0.9 % neb solution 3 mLs by Other route 2 times daily 300 mL 11    hydrOXYzine (ATARAX) 10 MG tablet Take 10 mg at bedtime PRN for pain and sleep (Patient not taking: Reported on 2023)      hydrOXYzine (VISTARIL) 25 MG capsule  (Patient not taking: Reported on 2023)      Multiple Vitamins-Minerals (MENS MULTIVITAMIN) TABS Take 1 tablet by mouth every morning         Allergies  Allergies   Allergen Reactions    Sulfamethoxazole-Trimethoprim Rash       Social History  Social History     Socioeconomic History    Marital status:      Spouse name: Not on file    Number of children: Not on file    Years of education: Not on file    Highest education level: Not on file   Occupational History    Not on file   Tobacco Use    Smoking status: Former     Packs/day: 2.00     Years: 30.00     Additional pack years: 0.00     Total pack years: 60.00     Types: Cigarettes     Quit date: 2019     Years since quittin.6     Passive exposure: Past    Smokeless tobacco: Never    Tobacco comments:     DAILY USE OF NICORETTE GUM   Vaping Use    Vaping Use: Never used   Substance and Sexual Activity    Alcohol use: Not Currently    Drug use: Never    Sexual activity: Not on file   Other Topics Concern    Parent/sibling w/ CABG, MI or angioplasty before 65F 55M? Not Asked   Social History Narrative    Not on file     Social Determinants of Health     Financial Resource Strain: Not on file   Food Insecurity: Not on file   Transportation Needs: Not on file   Physical Activity: Not on file   Stress: Not on file   Social Connections: Not on file    Interpersonal Safety: Not At Risk (9/20/2022)    Humiliation, Afraid, Rape, and Kick questionnaire     Fear of Current or Ex-Partner: No     Emotionally Abused: No     Physically Abused: No     Sexually Abused: No   Housing Stability: Not on file       Family History  Family History   Problem Relation Age of Onset    Lung Cancer Mother     Depression Mother     Polycythemia Father     Anesthesia Reaction Father         slow to awaken    Hypothyroidism Sister     Coronary Artery Disease Maternal Grandmother     Diabetes Maternal Grandmother     Hypertension Maternal Grandmother     Glaucoma No family hx of     Macular Degeneration No family hx of     Venous thrombosis No family hx of        Review of Systems  The complete review of systems is negative other than noted in the HPI or here.   Anesthesia Evaluation   Pt has had prior anesthetic.     No history of anesthetic complications       ROS/MED HX  ENT/Pulmonary: Comment: - Bilateral cataracts     (+) sleep apnea, doesn't use CPAP,   KISHAN risk factors, snores loudly, hypertension,         tobacco use, Past use,                   (-) asthma and COPD   Neurologic:  - neg neurologic ROS  (-) no seizures and no CVA   Cardiovascular:     (+) Dyslipidemia hypertension- -   -  - -                                 Previous cardiac testing   Echo: Date: 12/2/22 Results:  Interpretation Summary  Global and regional left ventricular function is normal with an EF of 55-60%.  The right ventricle is normal size.  Global right ventricular function is normal.  Pulmonary artery systolic pressure cannot be assessed.  IVC diameter <2.1 cm collapsing >50% with sniff suggests a normal RA pressure  of 3 mmHg.  No pericardial effusion is present.    Stress Test:  Date: 6/30/20 Results:  FINAL CONCLUSIONS     1. Probably normal myocardial perfusion study, despite diaphragmatic/soft   tissue attenuation artifact which may decrease sensitivity for coronary   artery disease.   2. No obvious  inducible ischemia or infarction.   3. Normal left ventricular size & calculated ejection fraction; no obvious   left ventricular regional wall motion abnormalities noted.     No previous study is available for comparison.     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 Now resolved per MRI this fall. Reports minimal back pain.      GI/Hepatic:     (+) GERD, Asymptomatic on medication,               (-) liver disease   Renal/Genitourinary: Comment: - Right RCC s/p right nephrectomy (2007)    (+) renal disease, type: CRI,            Endo:     (+)          thyroid problem, hypothyroidism, Chronic steroid usage for Other. Date most recently used: GVHD.        Psychiatric/Substance Use:  - neg psychiatric ROS     Infectious Disease:       Malignancy:   (+) Malignancy, History of Lymphoma/Leukemia and Other.Lymph CA Remission status post Surgery, Chemo and Radiation.  Other CA Right RCC Remission status post Surgery.    Other:            Virtual visit -  No vitals were obtained    Physical Exam  Constitutional: Awake, alert, cooperative, no apparent distress, and appears stated age.  Eyes: Pupils equal  HENT: Normocephalic  Respiratory: non labored breathing   Neurologic: Awake, alert, oriented to name, place and time.   Neuropsychiatric: Calm,  cooperative. Normal affect.      Prior Labs/Diagnostic Studies   All labs and imaging personally reviewed     Component      Latest Ref Rng 11/27/2023  7:53 AM   WBC      4.0 - 11.0 10e3/uL 9.7    RBC Count      4.40 - 5.90 10e6/uL 3.35 (L)    Hemoglobin      13.3 - 17.7 g/dL 11.0 (L)    Hematocrit      40.0 - 53.0 % 32.8 (L)    MCV      78 - 100 fL 98    MCH      26.5 - 33.0 pg 32.8    MCHC      31.5 - 36.5 g/dL 33.5    RDW      10.0 - 15.0 % 18.4 (H)    Platelet Count      150 - 450 10e3/uL 166    % Neutrophils      % 49    % Lymphocytes      % 39    % Monocytes      % 10    % Eosinophils      % 1    % Basophils      % 0    % Immature Granulocytes      % 1    NRBCs per 100 WBC      <1 /100 0    Absolute Neutrophils      1.6 - 8.3 10e3/uL 4.8    Absolute Lymphocytes      0.8 - 5.3 10e3/uL 3.7    Absolute Monocytes      0.0 - 1.3 10e3/uL 0.9    Absolute Eosinophils      0.0 - 0.7 10e3/uL 0.1    Absolute Basophils      0.0 - 0.2 10e3/uL 0.0    Absolute Immature Granulocytes      <=0.4 10e3/uL 0.1    Absolute NRBCs      10e3/uL 0.0    Sodium      135 - 145 mmol/L 139    Potassium      3.4 - 5.3 mmol/L 4.1    Chloride      98 - 107 mmol/L 103    Carbon Dioxide (CO2)      22 - 29 mmol/L 25    Anion Gap      7 - 15 mmol/L 11    Glucose      70 - 99 mg/dL 111 (H)    Urea Nitrogen      8.0 - 23.0 mg/dL 34.9 (H)    Creatinine      0.67 - 1.17 mg/dL 1.17    GFR Estimate      >60 mL/min/1.73m2 69    Calcium      8.8 - 10.2 mg/dL 10.0    Albumin      3.5 - 5.2 g/dL 4.2    Phosphorus      2.5 - 4.5 mg/dL 3.3       Legend:  (L) Low  (H) High    EKG/ stress test - if available please see in ROS above         Latest Ref Rng & Units 9/21/2023    12:46 PM   PFT   FVC L 4.48    FEV1 L 3.17    FVC% % 99    FEV1% % 91          The patient's records and results personally reviewed by this provider.     Outside records reviewed from: Care Everywhere      Assessment    Nik HARRY Nava is a 63 year old male seen as a PAC referral for risk  "assessment and optimization for anesthesia.    Plan/Recommendations  Pt will be optimized for the proposed procedure.  See below for details on the assessment, risk, and preoperative recommendations    NEUROLOGY  - No history of TIA, CVA or seizure  -Post Op delirium risk factors:  No risk identified    HEENT  - No current airway concerns.  Will need to be reassessed day of surgery.  Mallampati: Unable to assess  TM: Unable to assess    - Bilateral cataracts (see HPI) - surgery planned as above.     CARDIAC  - No history of CAD and Afib Patient  is able to achieve > 4 METS and denies any symptoms of CP, chest tightness, or SOB.  - Echo from 12/2/22 showing EF 55-60%.  - Hypertension managed on amlodipine - continue on DOS.   - Hyperlipidemia managed on rosuvastatin    - METS (Metabolic Equivalents)  Patient performs 4 or more METS exercise without symptoms            Total Score: 0      RCRI-Very low risk: Class 1 0.4% complication rate            Total Score: 0        PULMONARY  - Pt reports history of KISHAN 25 years ago. He reports he lost weight and was told he no longer needs CPAP.   KISHAN Medium Risk            Total Score: 4    KISHAN: Snores loudly    KISHAN: Hypertension    KISHAN: Over 50 ys old    KISHAN: Male      - Denies asthma or inhaler use  - Tobacco History    History   Smoking Status    Former    Packs/day: 2.00    Years: 30.00    Types: Cigarettes    Quit date: 5/4/2019   Smokeless Tobacco    Never       GI  - GERD  Controlled on medications: Proton Pump Inhibitor  PONV Medium Risk  Total Score: 2           1 AN PONV: Patient is not a current smoker    1 AN PONV: Intended Post Op Opioids        /RENAL  - Right RCC s/p right nephrectomy (2007)  - CKD, followed by Dr. Chew in nephrology   - Baseline Creatinine  1.17    ENDOCRINE    - BMI: Estimated body mass index is 32.07 kg/m  as calculated from the following:    Height as of 10/16/23: 1.854 m (6' 0.99\").    Weight as of 11/14/23: 110.2 kg (243 lb).  Obesity " (BMI >30)  - No history of Diabetes Mellitus  - Thyroid disorder  Continue home replacement while hospitalized.    HEME  VTE High Risk 3%            Total Score: 10    VTE: Greater than 59 yrs old    VTE: Male    VTE: Pt history of VTE    VTE: Family Hx of VTE      - No history of abnormal bleeding or antiplatelet use.  - ALL s/p bone marrow transplant (8/10/21), chemo, and radiation, now in remission. Following with BMT, last on 11/14/23.  - Chronic graft vs host disease (primarily affecting eyes and mouth) managed on daily prednisone (alternating between 10mg and 4mg daily)  - Hx of bilateral PE (2021) during chemo treatment, previously managed on xarelto.     - Chronic anemia with baseline hgb around 11.0.  Recommend perioperative use of blood conservation techniques intraoperatively and close monitoring for postoperative bleeding.      MSK  - Hx of osteomyelitis of lumbar spine (3/2023) treated with IV antibiotics managed by ID. Now resolved per MRI this fall. Reports minimal back pain.      Different anesthesia methods/types have been discussed with the patient, but they are aware that the final plan will be decided by the assigned anesthesia provider on the date of service.    The patient is optimized for their procedure. AVS with information on surgery time/arrival time, meds and NPO status given by nursing staff. No further diagnostic testing indicated.    Please refer to the physical examination documented by the anesthesiologist in the anesthesia record on the day of surgery.    Video-Visit Details    Type of service:  Video Visit    Provider received verbal consent for a Video Visit from the patient? Yes     Originating Location (pt. Location): Home    Distant Location (provider location):  Off-site  Mode of Communication:  Video Conference via [employer]  On the day of service:     Prep time: 20 minutes  Visit time: 20 minutes  Documentation time: 12 minutes  ------------------------------------------  Total  time: 52 minutes      PEE Jolly CNP  Preoperative Assessment Center  [facility]  Phone: [phone]  Fax: [phone]

## 2023-12-14 NOTE — PROGRESS NOTES
Nik is a 63 year old who is being evaluated via a billable video visit.      How would you like to obtain your AVS? MyChart  If the video visit is dropped, the invitation should be resent by: Text to cell phone: [phone]          HPI           Review of Systems     Physical Exam

## 2023-12-15 ENCOUNTER — ANESTHESIA (OUTPATIENT)
Dept: SURGERY | Facility: AMBULATORY SURGERY CENTER | Age: 63
End: 2023-12-15
Payer: MEDICARE

## 2023-12-15 RX ORDER — MOXIFLOXACIN 5 MG/ML
1 SOLUTION/ DROPS OPHTHALMIC 3 TIMES DAILY
Qty: 3 ML | Refills: 1 | OUTPATIENT
Start: 2023-12-15 | End: 2024-08-08

## 2023-12-15 RX ORDER — PREDNISOLONE ACETATE 10 MG/ML
1 SUSPENSION/ DROPS OPHTHALMIC 4 TIMES DAILY
Qty: 10 ML | Refills: 1 | OUTPATIENT
Start: 2023-12-15 | End: 2024-08-08

## 2023-12-15 RX ORDER — SODIUM CHLORIDE, SODIUM LACTATE, POTASSIUM CHLORIDE, CALCIUM CHLORIDE 600; 310; 30; 20 MG/100ML; MG/100ML; MG/100ML; MG/100ML
INJECTION, SOLUTION INTRAVENOUS CONTINUOUS
Status: CANCELLED | OUTPATIENT
Start: 2023-12-15

## 2023-12-18 ENCOUNTER — TELEPHONE (OUTPATIENT)
Dept: OPHTHALMOLOGY | Facility: CLINIC | Age: 63
End: 2023-12-18
Payer: MEDICARE

## 2023-12-18 NOTE — TELEPHONE ENCOUNTER
Called patient to schedule surgery with Dr. Menezes    Date of Surgery: 1/15,2/5    Location of surgery: [facility]    Pre-Op H&P: PCP    Pre/Post Imaging:  No    Post-Op Appt Date: 1/25,2/29    Surgery Packet Mailed: yes    Additional comments: Spoke with patient to reschedule past procedure due to patients ride having covid. He is rescheduled patient had preop done for 1st surgery on 12/14 originally. Patient is wondering if we need a new pre op or if  will update DOS message routed to provider        Anna C. Schoenecker on 12/18/2023 at 1:21 PM

## 2023-12-19 DIAGNOSIS — D89.813 GVHD AS COMPLICATION OF BONE MARROW TRANSPLANT (H): Primary | ICD-10-CM

## 2023-12-19 DIAGNOSIS — T86.09 GVHD AS COMPLICATION OF BONE MARROW TRANSPLANT (H): Primary | ICD-10-CM

## 2023-12-19 RX ORDER — PREDNISONE 20 MG/1
20 TABLET ORAL DAILY
Qty: 50 TABLET | Refills: 0 | Status: SHIPPED | OUTPATIENT
Start: 2023-12-19 | End: 2024-01-15

## 2023-12-19 NOTE — TELEPHONE ENCOUNTER
FUTURE VISIT INFORMATION        SURGERY INFORMATION:  Date: 1/15/24  Location:  or  Surgeon:  Deshawn Menezes MD   Anesthesia Type:  MAC with Retrobulbar   Procedure: LEFT EYE PHACOEMULSIFICATION, CATARACT, WITH STANDARD INTRAOCULAR LENS IMPLANT INSERTION   RECORDS REQUESTED FROM:         Primary Care Provider: Yamilet Hinojosa MD - [facility]     Most recent EKG+ [id number]     Most recent ECHO: 22     Most recent PFT's: 23

## 2023-12-21 ENCOUNTER — ONCOLOGY VISIT (OUTPATIENT)
Dept: TRANSPLANT | Facility: CLINIC | Age: 63
End: 2023-12-21
Attending: INTERNAL MEDICINE
Payer: COMMERCIAL

## 2023-12-21 ENCOUNTER — APPOINTMENT (OUTPATIENT)
Dept: LAB | Facility: CLINIC | Age: 63
End: 2023-12-21
Attending: INTERNAL MEDICINE
Payer: COMMERCIAL

## 2023-12-21 VITALS
TEMPERATURE: 97.9 F | OXYGEN SATURATION: 98 % | DIASTOLIC BLOOD PRESSURE: 83 MMHG | HEART RATE: 60 BPM | SYSTOLIC BLOOD PRESSURE: 128 MMHG | RESPIRATION RATE: 16 BRPM

## 2023-12-21 DIAGNOSIS — K12.1 ORAL ULCERATION: Primary | ICD-10-CM

## 2023-12-21 DIAGNOSIS — N18.2 CKD (CHRONIC KIDNEY DISEASE) STAGE 2, GFR 60-89 ML/MIN: ICD-10-CM

## 2023-12-21 DIAGNOSIS — C91.01 ACUTE LYMPHOBLASTIC LEUKEMIA (ALL) IN REMISSION (H): ICD-10-CM

## 2023-12-21 DIAGNOSIS — D89.813 GVHD AS COMPLICATION OF BONE MARROW TRANSPLANT (H): ICD-10-CM

## 2023-12-21 DIAGNOSIS — Z94.81 STATUS POST BONE MARROW TRANSPLANT (H): ICD-10-CM

## 2023-12-21 DIAGNOSIS — T86.09 GVHD AS COMPLICATION OF BONE MARROW TRANSPLANT (H): ICD-10-CM

## 2023-12-21 DIAGNOSIS — Z94.81 STATUS POST BONE MARROW TRANSPLANT (H): Primary | ICD-10-CM

## 2023-12-21 LAB
ALBUMIN SERPL BCG-MCNC: 4.3 G/DL (ref 3.5–5.2)
ALP SERPL-CCNC: 114 U/L (ref 40–150)
ALT SERPL W P-5'-P-CCNC: 23 U/L (ref 0–70)
ANION GAP SERPL CALCULATED.3IONS-SCNC: 11 MMOL/L (ref 7–15)
AST SERPL W P-5'-P-CCNC: 25 U/L (ref 0–45)
BASOPHILS # BLD AUTO: 0 10E3/UL (ref 0–0.2)
BASOPHILS NFR BLD AUTO: 0 %
BILIRUB SERPL-MCNC: 0.6 MG/DL
BUN SERPL-MCNC: 49.6 MG/DL (ref 8–23)
C PNEUM DNA SPEC QL NAA+PROBE: NOT DETECTED
CALCIUM SERPL-MCNC: 10.2 MG/DL (ref 8.8–10.2)
CHLORIDE SERPL-SCNC: 102 MMOL/L (ref 98–107)
CREAT SERPL-MCNC: 1.13 MG/DL (ref 0.67–1.17)
DEPRECATED HCO3 PLAS-SCNC: 24 MMOL/L (ref 22–29)
EGFRCR SERPLBLD CKD-EPI 2021: 72 ML/MIN/1.73M2
EOSINOPHIL # BLD AUTO: 0 10E3/UL (ref 0–0.7)
EOSINOPHIL NFR BLD AUTO: 0 %
ERYTHROCYTE [DISTWIDTH] IN BLOOD BY AUTOMATED COUNT: 17.5 % (ref 10–15)
FLUAV H1 2009 PAND RNA SPEC QL NAA+PROBE: NOT DETECTED
FLUAV H1 RNA SPEC QL NAA+PROBE: NOT DETECTED
FLUAV H3 RNA SPEC QL NAA+PROBE: NOT DETECTED
FLUAV RNA SPEC QL NAA+PROBE: NOT DETECTED
FLUBV RNA SPEC QL NAA+PROBE: NOT DETECTED
GLUCOSE SERPL-MCNC: 112 MG/DL (ref 70–99)
HADV DNA SPEC QL NAA+PROBE: NOT DETECTED
HCOV PNL SPEC NAA+PROBE: NOT DETECTED
HCT VFR BLD AUTO: 34.8 % (ref 40–53)
HGB BLD-MCNC: 11.9 G/DL (ref 13.3–17.7)
HMPV RNA SPEC QL NAA+PROBE: NOT DETECTED
HPIV1 RNA SPEC QL NAA+PROBE: NOT DETECTED
HPIV2 RNA SPEC QL NAA+PROBE: NOT DETECTED
HPIV3 RNA SPEC QL NAA+PROBE: NOT DETECTED
HPIV4 RNA SPEC QL NAA+PROBE: NOT DETECTED
IMM GRANULOCYTES # BLD: 0.1 10E3/UL
IMM GRANULOCYTES NFR BLD: 1 %
LYMPHOCYTES # BLD AUTO: 2.2 10E3/UL (ref 0.8–5.3)
LYMPHOCYTES NFR BLD AUTO: 18 %
M PNEUMO DNA SPEC QL NAA+PROBE: NOT DETECTED
MCH RBC QN AUTO: 33.9 PG (ref 26.5–33)
MCHC RBC AUTO-ENTMCNC: 34.2 G/DL (ref 31.5–36.5)
MCV RBC AUTO: 99 FL (ref 78–100)
MONOCYTES # BLD AUTO: 0.9 10E3/UL (ref 0–1.3)
MONOCYTES NFR BLD AUTO: 8 %
NEUTROPHILS # BLD AUTO: 9.3 10E3/UL (ref 1.6–8.3)
NEUTROPHILS NFR BLD AUTO: 73 %
NRBC # BLD AUTO: 0 10E3/UL
NRBC BLD AUTO-RTO: 0 /100
PLATELET # BLD AUTO: 283 10E3/UL (ref 150–450)
POTASSIUM SERPL-SCNC: 4.6 MMOL/L (ref 3.4–5.3)
PROT SERPL-MCNC: 7 G/DL (ref 6.4–8.3)
PTH-INTACT SERPL-MCNC: 58 PG/ML (ref 15–65)
RBC # BLD AUTO: 3.51 10E6/UL (ref 4.4–5.9)
RSV RNA SPEC QL NAA+PROBE: NOT DETECTED
RSV RNA SPEC QL NAA+PROBE: NOT DETECTED
RV+EV RNA SPEC QL NAA+PROBE: DETECTED
SARS-COV-2 RNA RESP QL NAA+PROBE: NEGATIVE
SODIUM SERPL-SCNC: 137 MMOL/L (ref 135–145)
VIT D+METAB SERPL-MCNC: 54 NG/ML (ref 20–50)
WBC # BLD AUTO: 12.5 10E3/UL (ref 4–11)

## 2023-12-21 PROCEDURE — 85025 COMPLETE CBC W/AUTO DIFF WBC: CPT

## 2023-12-21 PROCEDURE — 87529 HSV DNA AMP PROBE: CPT | Performed by: PHYSICIAN ASSISTANT

## 2023-12-21 PROCEDURE — 82306 VITAMIN D 25 HYDROXY: CPT

## 2023-12-21 PROCEDURE — 99213 OFFICE O/P EST LOW 20 MIN: CPT

## 2023-12-21 PROCEDURE — 83970 ASSAY OF PARATHORMONE: CPT

## 2023-12-21 PROCEDURE — 87635 SARS-COV-2 COVID-19 AMP PRB: CPT

## 2023-12-21 PROCEDURE — 84132 ASSAY OF SERUM POTASSIUM: CPT

## 2023-12-21 PROCEDURE — 87633 RESP VIRUS 12-25 TARGETS: CPT

## 2023-12-21 PROCEDURE — 250N000011 HC RX IP 250 OP 636: Mod: JZ | Performed by: INTERNAL MEDICINE

## 2023-12-21 PROCEDURE — 99215 OFFICE O/P EST HI 40 MIN: CPT

## 2023-12-21 PROCEDURE — 36591 DRAW BLOOD OFF VENOUS DEVICE: CPT

## 2023-12-21 RX ORDER — DEXAMETHASONE 0.5 MG/5ML
1 SOLUTION ORAL 4 TIMES DAILY
COMMUNITY
Start: 2023-12-21 | End: 2024-04-02

## 2023-12-21 RX ORDER — VALACYCLOVIR HYDROCHLORIDE 1 G/1
1000 TABLET, FILM COATED ORAL 2 TIMES DAILY
Qty: 20 TABLET | Refills: 0 | Status: SHIPPED | OUTPATIENT
Start: 2023-12-21 | End: 2024-01-08

## 2023-12-21 RX ORDER — HEPARIN SODIUM (PORCINE) LOCK FLUSH IV SOLN 100 UNIT/ML 100 UNIT/ML
5 SOLUTION INTRAVENOUS ONCE
Status: COMPLETED | OUTPATIENT
Start: 2023-12-21 | End: 2023-12-21

## 2023-12-21 RX ADMIN — Medication 5 ML: at 11:45

## 2023-12-21 ASSESSMENT — PAIN SCALES - GENERAL: PAINLEVEL: NO PAIN (0)

## 2023-12-21 NOTE — NURSING NOTE
Chief Complaint   Patient presents with    Oncology Clinic Visit     Status post bone marrow transplant; CKD (chronic kidney disease) stage 2, GFR 20-89 ml/min    Port Draw     Labs collected from port by RN. Checked in for appointment(s).       Port accessed with 20 gauge 3/4 inch gripper needle by RN, labs collected, line flushed with saline and heparin.  Vitals taken. Pt checked in for appointment(s).     Usha Sanabria RN

## 2023-12-21 NOTE — PROGRESS NOTES
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    HPI:    Here for eval of worsening oral lesions/pain, concern for GVHD flare vs other. On increased dose of pred 40mg/d (day 3; usual 10mg/4mg). No change yet. Started a few days after last visit when he received vaccines and stopped prophy ACV. Eyes stable. Wt down d/t decreased oral intake 2/2 pain. No n/v/d/c. No fevers. Chronic runny nose and cough, lingering the past 1.5mo.     Review of Systems                                                                                                                                         10 point Review of systems was negative except as detailed above     PHYSICAL EXAM                                                                                                                                                   KPS: 60  Blood pressure 128/83, pulse 60, temperature 97.9  F (36.6  C), temperature source Oral, resp. rate 16, SpO2 98%.  Wt Readings from Last 4 Encounters:   11/14/23 110.2 kg (243 lb)   11/14/23 110.3 kg (243 lb 1.6 oz)   10/16/23 109.8 kg (242 lb 1.6 oz)   10/10/23 111.7 kg (246 lb 3.2 oz)        General: NAD.  HEENT: sclera anicteric, injected conjunctivae.  12/21 mild lichenoid changes with small ulceration R and L buccal mucosa and and notable dime-sized area with several small ulcerations posterior L soft palate.  No lip lesions   Lungs:  CTA b/l  no wheezes  CV: RRR  no gallop  Abd; soft, NABS, protuberant  Ext/ Skin: Skin bruising on bilateral forearms,  fainting of previous hyperpigmented areas of previously known cGVHD  LE trace bilateral edema in lower extremities    cGVHD therapy started on February 24 2022  - Baseline NIH scoring 2/24/2022 : skin 2 maculopapular rash/erythema with no sclerotic features, mouth score 1 mild symptoms with lichenoid changes less than 25%, eyes score 2 moderate  symptoms without new visual impairments due to KCS requiring lubricant eyedrops more than 3 times a day, overall mild scale for her provider  - 3/25/2022 1 month NIH treatment assessment on PQRST: skin 2, mouth score 1 mild symptoms with NO lichenoid changes, eyes score 2 moderate symptoms w requiring lubricant eyedrops more than 3 times a day, liver score 1 ALT 3.7x ULN and nl bilirubin   - 3/31  Mouth clear; eyes minimal LFT still abn; no joint or new GI sx  - 4/28 Mouth clear, Left>R eye is mild sclera erythema, lids are pink and irritated appearing, LFT's slow improvement, no new joint, skin, or GI symptoms.   -5/11 mouth with mild erythema but no lichenoid changes, eyes using eyedrops 4-6 times a day score of 2, LFTs significantly improved from baseline slight elevation in alk phos and AST with normal bilirubin, skin with hyperpigmentation from old acute GVHD no active skin rashes, no GI symptoms no musculoskeletal complaints.  -5/26 Mouth with very slight lichenoid changes, erythema.  Eyes still using eyedrops 4-6x per day.  LFTs essentially normal with slight elevation in alk phos.  Skin with hyperpigmentation from old acute GVHD, no active rashes, no GI or MSK concerns.  Skin 0, mouth 0 but with lichenoid changes, eyes 2  -6/7/22 Mouth with no lichenoid changes, erythema.  Eyes still using eyedrops 4-6x per day.  LFTs essentially normal with slight elevation in alk phos.  Skin with hyperpigmentation from old acute GVHD, no active rashes, no GI or MSK concerns.  Skin 0, mouth 0, eyes 2     -6 month PQRST assessment 9/6/2022: eyes 3, mouth 0 for symptoms, 25% lichenoid changes (1), liver/GI/skin 0    11/8/2022, 11/22/2022, 12/13/22 cGVHD NIG scoring eyes 3, no other organ involvement clinically  3/28/23 cGVHD NIG scoring eyes 3, no other organ involvement clinically  5/2/23 cGVHD NIG scoring eyes 3, oral 1, no other organ involvement clinically  6/13/23 cGVHD NIG scoring eyes 3, oral 1, no other organ  involvement clinically  8/15/23 cGVHD NIG scoring eyes 3 (down to 3-4 times eye drop from >10 but still using scleral lenses), oral 1, no other organ involvement clinically  10/10/2023 cGVHD NIG scoring eyes 2-3 (down to 3-4 times eye drop from >10 but still using scleral lenses), oral 1, no other organ involvement clinically  11/14/2023 cGVHD NIG scoring eyes 2-3 (down to 3-4 times eye drop from >10 but still using scleral lenses), oral 1, no other organ involvement clinically    Lab:    Lab Results   Component Value Date    WBC 12.5 (H) 12/21/2023    ANEU 3.5 10/26/2021    HGB 11.9 (L) 12/21/2023    HCT 34.8 (L) 12/21/2023     12/21/2023     12/21/2023    POTASSIUM 4.6 12/21/2023    CHLORIDE 102 12/21/2023    CO2 24 12/21/2023     (H) 12/21/2023    BUN 49.6 (H) 12/21/2023    CR 1.13 12/21/2023    MAG 2.0 11/14/2023    INR 0.90 12/01/2022    BILITOTAL 0.6 12/21/2023    AST 25 12/21/2023    ALT 23 12/21/2023    ALKPHOS 114 12/21/2023    PROTTOTAL 7.0 12/21/2023    ALBUMIN 4.3 12/21/2023     Brief A/P:  ASSESSMENT AND PLAN   Nik Nava is a 63 year old male with Ph Pos ALL, day 863 s/p sib allo stem cell transplant    Concern for HSV oral lesions; PCR pending. Stopped ACV last month and lesions appears soon thereafter. Empirically Rx Valtrex. Hold dex s/s. MMW prn discomfort.  - mild lingering URI sx; RVP + rhino (12/21); supportive cares. (CCT 12/1/23 neg)  - cont pred 40mg/d for now pending improvement in oral ulcers in case GVHD flare/overlap  - RTC 1/9/24 with Dr. Avila Tobar; sooner prn    I spent 60 minutes in the care of this patient today, which included time necessary for preparation for the visit, obtaining history, ordering medications/tests/procedures as medically indicated, review of pertinent medical literature, counseling of the patient, communication of recommendations to the care team, and documentation time.  Addendum: HSV +, cont Valtrex course (will need prophy dose  thereafter).  Pred taper as tolerated per Dr. Avila Wiggins PA-C

## 2023-12-21 NOTE — LETTER
12/21/2023         RE: Nik Nava  [address] [facility] [id number]        Dear Colleague,    Thank you for referring your patient, Nik Nava, to the [facility]LOOD AND [facility]ARR[facility]PLANT [facility] [place]. Please see a copy of my visit note below.      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    HPI:    Here for eval of worsening oral lesions/pain, concern for GVHD flare vs other. On increased dose of pred 40mg/d (day 3; usual 10mg/4mg). No change yet. Started a few days after last visit when he received vaccines and stopped prophy ACV. Eyes stable. Wt down d/t decreased oral intake 2/2 pain. No n/v/d/c. No fevers. Chronic runny nose and cough, lingering the past 1.5mo.     Review of Systems                                                                                                                                         10 point Review of systems was negative except as detailed above     PHYSICAL EXAM                                                                                                                                                   KPS: 60  Blood pressure 128/83, pulse 60, temperature 97.9  F (36.6  C), temperature source Oral, resp. rate 16, SpO2 98%.  Wt Readings from Last 4 Encounters:   11/14/23 110.2 kg (243 lb)   11/14/23 110.3 kg (243 lb 1.6 oz)   10/16/23 109.8 kg (242 lb 1.6 oz)   10/10/23 111.7 kg (246 lb 3.2 oz)        General: NAD.  HEENT: sclera anicteric, injected conjunctivae.  12/21 mild lichenoid changes with small ulceration R and L buccal mucosa and and notable dime-sized area with several small ulcerations posterior L soft palate.  No lip lesions   Lungs:  CTA b/l  no wheezes  CV: RRR  no gallop  Abd; soft, NABS, protuberant  Ext/ Skin: Skin bruising on bilateral forearms,  fainting of previous hyperpigmented areas of previously known  cGVHD  LE trace bilateral edema in lower extremities    cGVHD therapy started on February 24 2022  - Baseline NIH scoring 2/24/2022 : skin 2 maculopapular rash/erythema with no sclerotic features, mouth score 1 mild symptoms with lichenoid changes less than 25%, eyes score 2 moderate symptoms without new visual impairments due to KCS requiring lubricant eyedrops more than 3 times a day, overall mild scale for her provider  - 3/25/2022 1 month NIH treatment assessment on PQRST: skin 2, mouth score 1 mild symptoms with NO lichenoid changes, eyes score 2 moderate symptoms w requiring lubricant eyedrops more than 3 times a day, liver score 1 ALT 3.7x ULN and nl bilirubin   - 3/31  Mouth clear; eyes minimal LFT still abn; no joint or new GI sx  - 4/28 Mouth clear, Left>R eye is mild sclera erythema, lids are pink and irritated appearing, LFT's slow improvement, no new joint, skin, or GI symptoms.   -5/11 mouth with mild erythema but no lichenoid changes, eyes using eyedrops 4-6 times a day score of 2, LFTs significantly improved from baseline slight elevation in alk phos and AST with normal bilirubin, skin with hyperpigmentation from old acute GVHD no active skin rashes, no GI symptoms no musculoskeletal complaints.  -5/26 Mouth with very slight lichenoid changes, erythema.  Eyes still using eyedrops 4-6x per day.  LFTs essentially normal with slight elevation in alk phos.  Skin with hyperpigmentation from old acute GVHD, no active rashes, no GI or MSK concerns.  Skin 0, mouth 0 but with lichenoid changes, eyes 2  -6/7/22 Mouth with no lichenoid changes, erythema.  Eyes still using eyedrops 4-6x per day.  LFTs essentially normal with slight elevation in alk phos.  Skin with hyperpigmentation from old acute GVHD, no active rashes, no GI or MSK concerns.  Skin 0, mouth 0, eyes 2     -6 month PQRST assessment 9/6/2022: eyes 3, mouth 0 for symptoms, 25% lichenoid changes (1), liver/GI/skin 0    11/8/2022, 11/22/2022,  12/13/22 cGVHD NIG scoring eyes 3, no other organ involvement clinically  3/28/23 cGVHD NIG scoring eyes 3, no other organ involvement clinically  5/2/23 cGVHD NIG scoring eyes 3, oral 1, no other organ involvement clinically  6/13/23 cGVHD NIG scoring eyes 3, oral 1, no other organ involvement clinically  8/15/23 cGVHD NIG scoring eyes 3 (down to 3-4 times eye drop from >10 but still using scleral lenses), oral 1, no other organ involvement clinically  10/10/2023 cGVHD NIG scoring eyes 2-3 (down to 3-4 times eye drop from >10 but still using scleral lenses), oral 1, no other organ involvement clinically  11/14/2023 cGVHD NIG scoring eyes 2-3 (down to 3-4 times eye drop from >10 but still using scleral lenses), oral 1, no other organ involvement clinically    Lab:    Lab Results   Component Value Date    WBC 12.5 (H) 12/21/2023    ANEU 3.5 10/26/2021    HGB 11.9 (L) 12/21/2023    HCT 34.8 (L) 12/21/2023     12/21/2023     12/21/2023    POTASSIUM 4.6 12/21/2023    CHLORIDE 102 12/21/2023    CO2 24 12/21/2023     (H) 12/21/2023    BUN 49.6 (H) 12/21/2023    CR 1.13 12/21/2023    MAG 2.0 11/14/2023    INR 0.90 12/01/2022    BILITOTAL 0.6 12/21/2023    AST 25 12/21/2023    ALT 23 12/21/2023    ALKPHOS 114 12/21/2023    PROTTOTAL 7.0 12/21/2023    ALBUMIN 4.3 12/21/2023     Brief A/P:  ASSESSMENT AND PLAN   Nki Nava is a 63 year old male with Ph Pos ALL, day 863 s/p sib allo stem cell transplant    Concern for HSV oral lesions; PCR pending. Stopped ACV last month and lesions appears soon thereafter. Empirically Rx Valtrex. Hold dex s/s. MMW prn discomfort.  - mild lingering URI sx; RVP + rhino (12/21); supportive cares. (CCT 12/1/23 neg)  - cont pred 40mg/d for now pending improvement in oral ulcers in case GVHD flare/overlap  - RTC 1/9/24 with Dr. Avila Tobar; sooner prn    I spent 60 minutes in the care of this patient today, which included time necessary for preparation for the visit, obtaining  history, ordering medications/tests/procedures as medically indicated, review of pertinent medical literature, counseling of the patient, communication of recommendations to the care team, and documentation time.    Renetta Wiggins PA-C

## 2023-12-21 NOTE — NURSING NOTE
"Oncology Rooming Note    December 21, 2023 11:45 AM   Nik Nava is a 63 year old male who presents for:    Chief Complaint   Patient presents with    Oncology Clinic Visit     Status post bone marrow transplant; CKD (chronic kidney disease) stage 2, GFR 20-89 ml/min    Port Draw     Labs collected from port by RN. Checked in for appointment(s).       Initial Vitals: /83 (BP Location: Right arm, Patient Position: Sitting, Cuff Size: Adult Regular)   Pulse 60   Temp 97.9  F (36.6  C) (Oral)   Resp 16   SpO2 98%  Estimated body mass index is 32.07 kg/m  as calculated from the following:    Height as of 10/16/23: 1.854 m (6' 0.99\").    Weight as of 11/14/23: 110.2 kg (243 lb). There is no height or weight on file to calculate BSA.  No Pain (0) Comment: Data Unavailable   No LMP for male patient.  Allergies reviewed: Yes  Medications reviewed: Yes    Medications: Medication refills not needed today.  Pharmacy name entered into [employer]:    [facility] - [place], [place] - [address] [facility] [place], [place] - [address] [place] [address] [phone]  [facility]    Frailty Screening:   Is the patient here for a new oncology consult visit in cancer care? 2. No      Clinical concerns: none       Merry Gomez              "

## 2023-12-22 ENCOUNTER — CARE COORDINATION (OUTPATIENT)
Dept: TRANSPLANT | Facility: CLINIC | Age: 63
End: 2023-12-22

## 2023-12-22 DIAGNOSIS — I10 HYPERTENSION, ESSENTIAL: ICD-10-CM

## 2023-12-22 LAB
HSV1 DNA SPEC QL NAA+PROBE: DETECTED
HSV2 DNA SPEC QL NAA+PROBE: NOT DETECTED

## 2023-12-22 RX ORDER — AMLODIPINE BESYLATE 5 MG/1
5 TABLET ORAL DAILY
Qty: 30 TABLET | Refills: 6 | Status: SHIPPED | OUTPATIENT
Start: 2023-12-22 | End: 2024-01-24

## 2023-12-22 NOTE — PROGRESS NOTES
Discussed prednisone dosing with Nik's wife Lamar. Per Dr. Avila Tobar, Nik can start prednisone taper and decrease by 10mg q3 days as long as he is improving clinically. Lamar stated she understood plan and will start tomorrow. Plan to check back in next week to see if things have improved.

## 2023-12-24 ENCOUNTER — TELEPHONE (OUTPATIENT)
Dept: ONCOLOGY | Facility: CLINIC | Age: 63
End: 2023-12-24
Payer: MEDICARE

## 2023-12-24 NOTE — TELEPHONE ENCOUNTER
Hematology/Oncology Fellow Note  12/24/2023    I was contacted via page about this patient. Briefly, this patient has been recently diagnosed with HSV-1 oral lesions and is also being concurrently treated with prednisone for possible GVHD flare/overlap for oral ulcers. The patient/pt spouse feel that the lesions are about the same and thus wanted to call to see if there was something extra they should be doing/worried about. The patient is currently taking leftover oxicodone, tylenol, magic mouthwash for symptom alleviation. He feels OK on it but not back to normal. The patient denies any systemic signs/symptoms of infection including fevers, new rashes, dyspnea etc.     She doesn't believe there is new lesions (and the patient declines there being new lesions present).     I explained to the spouse/patient that being only about 2 days since start of therapy, that Valtrex does take time for resolution of lesions. I mentioned to continue the current management plan. I advised the patient's spouse to try to take daily videos/pictures of the mouth to evaluate for potential new lesions. If so, this could be failure of therapy and I mentioned to call back our Triage line. I also gave precautions on when to be evaluated sooner/go to ED (ie fevers, feelings of throat closing, worsening lesions).  I also advised if the patient wasn't feeling better pain control wise by tomorrow or so, to call back our triage line and on-call fellow at that time could prescribe Gabapentin to help treat pain from the lesions. The patient and spouse were understanding of plan and agreeable.     I also spoke with my attending ,Dr. Fan who agreed with above plan.    Thank you for contacting hematology/oncology, please don't hesitate to re-page with questions.    Saúl Martines, DO   Hematology/Oncology/BMT Fellow PGY4  Pager: [phone]

## 2023-12-26 ENCOUNTER — CARE COORDINATION (OUTPATIENT)
Dept: TRANSPLANT | Facility: CLINIC | Age: 63
End: 2023-12-26
Payer: MEDICARE

## 2023-12-26 RX ORDER — OXYCODONE HYDROCHLORIDE 5 MG/1
5 TABLET ORAL EVERY 6 HOURS PRN
Qty: 15 TABLET | Refills: 0 | Status: SHIPPED | OUTPATIENT
Start: 2023-12-26 | End: 2023-12-28

## 2023-12-26 NOTE — PROGRESS NOTES
BMT Nurse Triage - Generic/Other Symptoms     Treating Provider:   Avila Tobar    Date of last office visit: 12/21    Onset of symptoms: 11/2 week     Duration of symptoms: 1 1/2 week    Brief description of symptoms: Painful mouth sores, HSV+. Spoke with GIRISH Renetta about continued pain in Nik's mouth. Rx sent for Oxycodone, encouraged MMW use, soft foods, and also ensure he takes fully Valtrex prescription. Stay at 20mg prednisone dosing. Discussed with wife Lamar and acknowledged understanding.

## 2023-12-28 ENCOUNTER — TELEPHONE (OUTPATIENT)
Dept: OPHTHALMOLOGY | Facility: CLINIC | Age: 63
End: 2023-12-28
Payer: MEDICARE

## 2023-12-28 DIAGNOSIS — C91.01 ACUTE LYMPHOBLASTIC LEUKEMIA (ALL) IN REMISSION (H): ICD-10-CM

## 2023-12-28 DIAGNOSIS — Z94.81 STATUS POST BONE MARROW TRANSPLANT (H): ICD-10-CM

## 2023-12-28 DIAGNOSIS — K12.1 ORAL ULCERATION: ICD-10-CM

## 2023-12-28 RX ORDER — OXYCODONE HYDROCHLORIDE 5 MG/1
10 TABLET ORAL EVERY 6 HOURS PRN
Qty: 30 TABLET | Refills: 0 | Status: SHIPPED | OUTPATIENT
Start: 2023-12-28 | End: 2024-05-09

## 2023-12-28 RX ORDER — PENTAMIDINE ISETHIONATE 300 MG/300MG
300 INHALANT RESPIRATORY (INHALATION)
Status: CANCELLED | OUTPATIENT
Start: 2024-01-09 | End: 2024-01-09

## 2023-12-28 NOTE — TELEPHONE ENCOUNTER
[facility] Call Center    Phone Message    May a detailed message be left on voicemail: yes     Reason for Call: Other: Pt sate that he is due to have a procedure w/.  Pt states that his dosage of predniSONE (DELTASONE) 20 MG tablet has been changed to 40 mg per day and wants to know if that would effect proceeding w/ ruba. Please review and contact pt to discuss. Thank you       Action Taken: Message routed to:  [facility] eye    Travel Screening: Not Applicable

## 2023-12-28 NOTE — TELEPHONE ENCOUNTER
Spoke to pt/wife at 1100    Recent GVHD in mouth with mouth sores and herptic/shingles infection likely secondary to shingrix vaccine.    No rash/lesion on face/around eyes.    Sores improving with treatment    Pt on course of Valtrex and oral prednisone increased from 20 to 40 mg.    Pt scheduled for surgery January 15th.    Reviewed ok to plan on surgery as scheduled and increase in oral prednisone will not effect cataract surgery.    Reviewed would forward to Dr. Menezes for review and review post-op scheduling change if applicable to check eye pressure in post-op period    Pt/wife verbally demonstrated understanding and seemed comfortable with information.    Chris Mireles RN 11:13 AM 12/28/23

## 2023-12-29 ENCOUNTER — TELEPHONE (OUTPATIENT)
Dept: NEPHROLOGY | Facility: CLINIC | Age: 63
End: 2023-12-29
Payer: MEDICARE

## 2024-01-05 DIAGNOSIS — Z94.81 STATUS POST BONE MARROW TRANSPLANT (H): Primary | ICD-10-CM

## 2024-01-08 ENCOUNTER — PRE VISIT (OUTPATIENT)
Dept: SURGERY | Facility: CLINIC | Age: 64
End: 2024-01-08

## 2024-01-08 ENCOUNTER — VIRTUAL VISIT (OUTPATIENT)
Dept: SURGERY | Facility: CLINIC | Age: 64
End: 2024-01-08
Payer: MEDICARE

## 2024-01-08 DIAGNOSIS — Z01.818 PRE-OP EVALUATION: Primary | ICD-10-CM

## 2024-01-08 PROCEDURE — 99213 OFFICE O/P EST LOW 20 MIN: CPT | Performed by: PHYSICIAN ASSISTANT

## 2024-01-08 ASSESSMENT — ENCOUNTER SYMPTOMS: SEIZURES: 0

## 2024-01-08 ASSESSMENT — LIFESTYLE VARIABLES: TOBACCO_USE: 1

## 2024-01-08 ASSESSMENT — COPD QUESTIONNAIRES: COPD: 0

## 2024-01-08 ASSESSMENT — PAIN SCALES - GENERAL: PAINLEVEL: MILD PAIN (2)

## 2024-01-08 NOTE — PATIENT INSTRUCTIONS
Preparing for Your Surgery      Name:  Nik Nava   MRN:  [id number]   :  1960   Today's Date:  2024       Arriving for surgery:  Surgery date:  1/15/24  Arrival time:  10:50 am      Please come to:   [facility] [address] [address]     Parking is available in front of the [facility] building from 5:30AM to 8:00PM.  -  Proceed to the 5th floor to check into the Ambulatory Surgery Center.              >> There will be patient concierges on the 1st and 5th floor, for assistance or an escort, if you would like.              >> Please call [phone] with any questions.    What can I eat or drink?  -  You may eat and drink normally up to 8 hours prior to arrival time.   -  You may have clear liquids until 2 hours prior to arrival time.     Examples of clear liquids:  Water  Clear broth  Juices (apple, white grape, white cranberry  and cider) without pulp  Noncarbonated, powder based beverages  (lemonade and Robbin-Aid)  Sodas (Sprite, 7-Up, ginger ale and seltzer)  Coffee or tea (without milk or cream)  Gatorade    -  No Alcohol or cannabis products for at least 24 hours before surgery.     Which medicines can I take?    Hold Aspirin for 7 days before surgery.   Hold Multivitamins for 7 days before surgery.  Hold Supplements for 7 days before surgery.  Hold Ibuprofen (Advil, Motrin) for 1 day(s) before surgery--unless otherwise directed by surgeon.  Hold Naproxen (Aleve) for 4 days before surgery.    -  DO NOT take these medications the day of surgery:  Miralax + nutritional supplement.    -  PLEASE TAKE these medications the day of surgery:  Tylenol or oxycodone if needed; take other scheduled morning medications.    How do I prepare myself?  - Please take 2 showers (one the night prior to surgery and one the morning of surgery) using Scrubcare or Hibiclens soap.    Use this soap only from the neck to your  toes.     Leave the soap on your skin for one minute--then rinse thoroughly.      You may use your own shampoo and conditioner. No other hair products.   - Please remove all jewelry and body piercings.  - No lotions, deodorants or fragrance.  - No makeup or fingernail polish.   - Bring your ID and insurance card.    -If you use a CPAP machine, please bring the CPAP machine, tubing, and mask to hospital.    -If you have a Deep Brain Stimulator, Spinal Cord Stimulator, or any Neuro Stimulator device---you must bring the remote control to the hospital.      ALL PATIENTS GOING HOME THE SAME DAY OF SURGERY ARE REQUIRED TO HAVE A RESPONSIBLE ADULT TO DRIVE AND BE IN ATTENDANCE WITH THEM FOR 24 HOURS FOLLOWING SURGERY.    Covid testing policy as of 12/06/2022  Your surgeon will notify and schedule you for a COVID test if one is needed before surgery--please direct any questions or COVID symptoms to your surgeon      Questions or Concerns:    - For any questions regarding the day of surgery or your hospital stay, please contact the Pre Admission Nursing Office at [phone].       - If you have health changes between today and your surgery, please call your surgeon.       - For questions after surgery, please call your surgeons office.           Current Visitor Guidelines    You may have 2 visitors in the pre op area.    Visiting hours: 8 a.m. to 8:30 p.m.    Patients confirmed or suspected to have symptoms of COVID 19 or flu:     No visitors allowed for adult patients.   Children (under age 18) can have 1 named visitor.     People who are sick or showing symptoms of COVID 19 or flu:    Are not allowed to visit patients--we can only make exceptions in special situations.       Please follow these guidelines for your visit:          Please maintain social distance          Masking is optional--however at times you may be asked to wear a mask for the safety of yourself and others     Clean your hands with alcohol hand  . Do this when you arrive at and leave the building and patient room,    And again after you touch your mask or anything in the room.     Go directly to and from the room you are visiting.     Stay in the patient s room during your visit. Limit going to other places in the hospital as much as possible     Leave bags and jackets at home or in the car.     For everyone s health, please don t come and go during your visit. That includes for smoking   during your visit.

## 2024-01-08 NOTE — H&P
Pre-Operative H & P     CC:  Preoperative exam to assess for increased cardiopulmonary risk while undergoing surgery and anesthesia.    Date of Encounter: 1/8/2024  Primary Care Physician:  Wojciech Soares     Reason for visit:   Encounter Diagnosis   Name Primary?    Pre-op evaluation Yes       HPI  Nik Nava is a 63 year old male who presents for pre-operative H & P in preparation for  Procedure Information       Case: [id number] Date/Time: 01/15/24 1220    Procedure: LEFT EYE PHACOEMULSIFICATION, CATARACT, WITH STANDARD INTRAOCULAR LENS IMPLANT INSERTION (Left: Eye)    Anesthesia type: MAC with Retrobulbar    Diagnosis:       Combined forms of age-related cataract of both eyes [H25.813]      Dry eye [H04.129]    Pre-op diagnosis:       Combined forms of age-related cataract of both eyes [H25.813]      Dry eye [H04.129]    Location: [facility] OR  / [facility]    Providers: Deshawn Menezes MD            Patient is being evaluated for comorbid conditions of ALL s/p peripheral stem cell transplant, GVHD, RCC, postablative hypothyroidism, h/o PE, KISHAN    Mr. Nava has a history fo bilateral cataracts. He was seen by Dr. Menezes and is now scheduled for the above procedure.     History is obtained from the patient and chart review. Patient's wife was also present for this visit     Hx of abnormal bleeding or anti-platelet use: denies      Past Medical History  Past Medical History:   Diagnosis Date    ALL (acute lymphocytic leukemia) (H)     CKD (chronic kidney disease)     GVHD (graft versus host disease) (H)     H/O peripheral stem cell transplant (H)     Hyperthyroidism 1996    Infection due to 2019 novel coronavirus 01/16/2023    Influenza A 11/2022    Postablative hypothyroidism 1997    Pulmonary embolism (H)     Renal cell carcinoma (H) 2007    right kidney    Sleep apnea        Past Surgical History  Past Surgical History:   Procedure Laterality Date     BACK SURGERY  2017    BONE MARROW BIOPSY, BONE SPECIMEN, NEEDLE/TROCAR Left 09/02/2021    Procedure: BIOPSY, BONE MARROW;  Surgeon: Jailyn Ny APRN CNP;  Location: [facility] OR    BONE MARROW BIOPSY, BONE SPECIMEN, NEEDLE/TROCAR Left 11/15/2021    Procedure: BIOPSY, BONE MARROW;  Surgeon: Socrates De La Torre;  Location: [facility] OR    BONE MARROW BIOPSY, BONE SPECIMEN, NEEDLE/TROCAR Left 02/07/2022    Procedure: BIOPSY, BONE MARROW;  Surgeon: Renetta Wiggins PA-C;  Location: [facility] OR    BONE MARROW BIOPSY, BONE SPECIMEN, NEEDLE/TROCAR Left 08/18/2022    Procedure: BIOPSY, BONE MARROW;  Surgeon: Lorrie Yap PA-C;  Location: [facility] OR    BONE MARROW BIOPSY, BONE SPECIMEN, NEEDLE/TROCAR Left 8/7/2023    Procedure: Bone marrow biopsy, bone specimen, needle/trocar;  Surgeon: Teressa Rick PA-C;  Location: [facility] OR    BRONCHOSCOPY (RIGID OR FLEXIBLE), DIAGNOSTIC N/A 04/29/2022    Procedure: BRONCHOSCOPY, WITH BRONCHOALVEOLAR LAVAGE;  Surgeon: Murtaza Garcia MD;  Location: [facility] GI    IR CVC TUNNEL PLACEMENT > 5 YRS OF AGE  08/04/2021    IR CVC TUNNEL REMOVAL LEFT  09/02/2021    IR LIVER BIOPSY PERCUTANEOUS  02/21/2022    NEPHRECTOMY  2007    PERCUTANEOUS BIOPSY LIVER N/A 02/21/2022    Procedure: NEEDLE BIOPSY, LIVER, PERCUTANEOUS;  Surgeon: Trace Castellanos MD;  Location: [facility] OR       Prior to Admission Medications  Current Outpatient Medications   Medication Sig Dispense Refill    acetaminophen (TYLENOL) 500 MG tablet Take 500 mg by mouth as needed      albuterol (PROAIR HFA/PROVENTIL HFA/VENTOLIN HFA) 108 (90 Base) MCG/ACT inhaler Inhale 1-2 puffs into the lungs      amLODIPine (NORVASC) 5 MG tablet Take 1 tablet (5 mg) by mouth daily 30 tablet 6    Carboxymethylcell-Glycerin PF (REFRESH RELIEVA PF) 0.5-1 % SOLN Apply 1 drop to eye 4 times daily Preservative free. Either PF multidose bottle or PF vials 30 each 11    dexAMETHasone alcohol-free (DECADRON) 0.1 MG/ML solution Hold while on Valtrex       escitalopram (LEXAPRO) 10 MG tablet Take 1 tablet (10 mg) by mouth daily (Patient taking differently: Take 10 mg by mouth every evening) 30 tablet 3    fluorometholone (FML LIQUIFILM) 0.1 % ophthalmic suspension Place 1 drop into both eyes 4 times daily (Patient taking differently: Place 1 drop into both eyes 2 times daily) 10 mL 1    fluticasone (FLONASE) 50 MCG/ACT nasal spray Spray 2 sprays in nostril as needed      hydrOXYzine (ATARAX) 10 MG tablet Take 10 mg at bedtime PRN for pain and sleep (Patient not taking: Reported on 12/14/2023)      hydrOXYzine (VISTARIL) 25 MG capsule  (Patient not taking: Reported on 12/14/2023)      levothyroxine (SYNTHROID/LEVOTHROID) 125 MCG tablet Take 1 tablet (125 mcg) by mouth daily (Patient taking differently: Take 125 mcg by mouth every morning) 90 tablet 4    LORazepam (ATIVAN) 1 MG tablet Take 2 tablets (2 mg) by mouth nightly as needed for anxiety or sleep 30 tablet 3    magic mouthwash suspension (diphenhydrAMINE, lidocaine, aluminum-magnesium & simethicone) Swish and spit 5-10 mLs in mouth every 4 hours as needed for mouth sores 400 mL 1    methocarbamol (ROBAXIN) 750 MG tablet Take 750 mg by mouth as needed      Multiple Vitamins-Minerals (MENS MULTIVITAMIN) TABS Take 1 tablet by mouth every morning      nicotine polacrilex (NICORETTE) 4 MG gum Take 4 mg by mouth as needed for smoking cessation       Nutritional Supplements (ENSURE COMPLETE SHAKE) LIQD Take by mouth every morning      omeprazole (PRILOSEC) 40 MG DR capsule Take 1 capsule (40 mg) by mouth daily (Patient taking differently: Take 40 mg by mouth every morning) 30 capsule 3    oxyCODONE (ROXICODONE) 5 MG tablet Take 2 tablets (10 mg) by mouth every 6 hours as needed for pain 30 tablet 0    oxyCODONE (ROXICODONE) 5 MG tablet Take 1-2 tablets (5-10 mg) by mouth 2 times daily as needed for pain (max 4 tabs/day) Fill 8/23/2023 start 8/24/2023. 120 tablet 0    polyethylene glycol (MIRALAX) 17 GM/Dose powder Take  1 Capful by mouth as needed      predniSONE (DELTASONE) 1 MG tablet Take 4 tablets (4 mg) by mouth daily Take on even days (Patient taking differently: Take 4 mg by mouth every morning Take on even days) 120 tablet 3    predniSONE (DELTASONE) 20 MG tablet Take 1 tablet (20 mg) by mouth daily (Patient taking differently: Take 40 mg by mouth daily) 50 tablet 0    predniSONE (DELTASONE) 5 MG tablet Take 2 tablets (10 mg) by mouth every other day Take on odd days 60 tablet 3    rosuvastatin (CRESTOR) 5 MG tablet Take 1 tablet (5 mg) by mouth daily (Patient taking differently: Take 5 mg by mouth every evening) 30 tablet 3    sennosides (SENOKOT) 8.6 MG tablet Take 1 tablet by mouth 3 times daily as needed for constipation (Patient taking differently: Take 1 tablet by mouth every evening) 90 tablet 0    sodium chloride 0.9 % neb solution 3 mLs by Other route 2 times daily 300 mL 11    valACYclovir (VALTREX) 1000 mg tablet Take 1 tablet (1,000 mg) by mouth 2 times daily for 10 days 20 tablet 0       Allergies  Allergies   Allergen Reactions    Sulfamethoxazole-Trimethoprim Rash       Social History  Social History     Socioeconomic History    Marital status:      Spouse name: Not on file    Number of children: Not on file    Years of education: Not on file    Highest education level: Not on file   Occupational History    Not on file   Tobacco Use    Smoking status: Former     Packs/day: 2.00     Years: 30.00     Additional pack years: 0.00     Total pack years: 60.00     Types: Cigarettes     Quit date: 2019     Years since quittin.6     Passive exposure: Past    Smokeless tobacco: Never    Tobacco comments:     DAILY USE OF NICORETTE GUM   Vaping Use    Vaping Use: Never used   Substance and Sexual Activity    Alcohol use: Not Currently    Drug use: Never    Sexual activity: Not on file   Other Topics Concern    Parent/sibling w/ CABG, MI or angioplasty before 65F 55M? Not Asked   Social History Narrative     Not on file     Social Determinants of Health     Financial Resource Strain: Not on file   Food Insecurity: Not on file   Transportation Needs: Not on file   Physical Activity: Not on file   Stress: Not on file   Social Connections: Not on file   Interpersonal Safety: Not At Risk (9/20/2022)    Humiliation, Afraid, Rape, and Kick questionnaire     Fear of Current or Ex-Partner: No     Emotionally Abused: No     Physically Abused: No     Sexually Abused: No   Housing Stability: Not on file       Family History  Family History   Problem Relation Age of Onset    Lung Cancer Mother     Depression Mother     Polycythemia Father     Anesthesia Reaction Father         slow to awaken    Hypothyroidism Sister     Coronary Artery Disease Maternal Grandmother     Diabetes Maternal Grandmother     Hypertension Maternal Grandmother     Glaucoma No family hx of     Macular Degeneration No family hx of     Venous thrombosis No family hx of        Review of Systems  The complete review of systems is negative other than noted in the HPI or here.   Anesthesia Evaluation   Pt has had prior anesthetic.     No history of anesthetic complications       ROS/MED HX  ENT/Pulmonary: Comment: Bilateral cataracts     (+) sleep apnea, doesn't use CPAP,   KISHAN risk factors, snores loudly, hypertension,         tobacco use, Past use,                    (-) asthma and COPD   Neurologic:  - neg neurologic ROS  (-) no seizures and no CVA   Cardiovascular:     (+) Dyslipidemia hypertension- -   -  - -                                 Previous cardiac testing   Echo: Date: 12/2/22 Results:  Interpretation Summary  Global and regional left ventricular function is normal with an EF of 55-60%.  The right ventricle is normal size.  Global right ventricular function is normal.  Pulmonary artery systolic pressure cannot be assessed.  IVC diameter <2.1 cm collapsing >50% with sniff suggests a normal RA pressure  of 3 mmHg.  No pericardial effusion is  present.    Stress Test:  Date: 6/30/20 Results:  FINAL CONCLUSIONS     1. Probably normal myocardial perfusion study, despite diaphragmatic/soft   tissue attenuation artifact which may decrease sensitivity for coronary   artery disease.   2. No obvious inducible ischemia or infarction.   3. Normal left ventricular size & calculated ejection fraction; no obvious   left ventricular regional wall motion abnormalities noted.     No previous study is available for comparison.     ECG Reviewed:  Date: 8/27/23 Results:  SB  Cath:  Date: Results:   (-) CAD and CHF   METS/Exercise Tolerance: >4 METS Comment: Walking to the store, ascending stairs in the home.   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 Now resolved per MRI this fall. Reports minimal back pain.      GI/Hepatic:     (+) GERD, Asymptomatic on medication,               (-) liver disease   Renal/Genitourinary: Comment: - Right RCC s/p right nephrectomy (2007)    (+) renal disease, type: CRI, Pt does not require dialysis,           Endo:     (+)          thyroid problem, hypothyroidism, Chronic steroid usage for Other. Date most recently used: GVHD.        Psychiatric/Substance Use:  - neg psychiatric ROS     Infectious Disease:       Malignancy:   (+) Malignancy, History of Lymphoma/Leukemia and Other.Lymph CA Remission status post Surgery, Chemo and Radiation.  Other CA Right RCC Remission status post Surgery.    Other:            Virtual visit -  No vitals were obtained    Physical Exam  Constitutional: Awake, alert,  cooperative, no apparent distress, and appears stated age.  HENT: Normocephalic  Respiratory: non labored breathing   Neurologic: Awake, alert, oriented to name, place and time.   Neuropsychiatric: Calm, cooperative. Normal affect.      Prior Labs/Diagnostic Studies   All labs and imaging personally reviewed     EKG/ stress test - if available please see in ROS above   Echo result w/o MOPS: Interpretation SummaryGlobal and regional left ventricular function is normal with an EF of 55-60%.The right ventricle is normal size.Global right ventricular function is normal.Pulmonary artery systolic pressure cannot be assessed.IVC diameter <2.1 cm collapsing >50% with sniff suggests a normal RA pressureof 3 mmHg.No pericardial effusion is present.          Latest Ref Rng & Units 9/21/2023    12:46 PM   PFT   FVC L 4.48    FEV1 L 3.17    FVC% % 99    FEV1% % 91          The patient's records and results personally reviewed by this provider.     Outside records reviewed from: Care Everywhere      Assessment    Nik Nava is a 63 year old male seen as a PAC referral for risk assessment and optimization for anesthesia.    Plan/Recommendations  Pt will be optimized for the proposed procedure.  See below for details on the assessment, risk, and preoperative recommendations    NEUROLOGY  - No history of TIA, CVA or seizure  -Post Op delirium risk factors:  No risk identified    ENT  - No current airway concerns.  Will need to be reassessed day of surgery.  Mallampati: Unable to assess  TM: Unable to assess    CARDIAC  - No history of CAD and Afib  -hypertension using amlodipine  -dyslipidemia using rovustatin   -denies cardiac symptoms.   -previous cardiac testing above   - METS (Metabolic Equivalents)  Patient performs 4 or more METS exercise without symptoms            Total Score: 0      RCRI-Very low risk: Class 1 0.4% complication rate            Total Score: 0        PULMONARY  -h/o KISHAN, Improved after weightloss  -  "Denies asthma or inhaler use  - Tobacco History    History   Smoking Status    Former    Packs/day: 2.00    Years: 30.00    Types: Cigarettes    Quit date: 5/4/2019   Smokeless Tobacco    Never       GI  - GERD  Controlled on medications: Proton Pump Inhibitor  PONV Medium Risk  Total Score: 2           1 AN PONV: Patient is not a current smoker    1 AN PONV: Intended Post Op Opioids        /RENAL  -h/o RCC s/p nephrectomy  -CRI, baseline Cr 1.17    ENDOCRINE    - BMI: Estimated body mass index is 32.07 kg/m  as calculated from the following:    Height as of 10/16/23: 1.854 m (6' 0.99\").    Weight as of 11/14/23: 110.2 kg (243 lb).  Obesity (BMI >30)  - No history of Diabetes Mellitus  -hypothyroid     HEME  VTE High Risk 3%            Total Score: 10    VTE: Greater than 59 yrs old    VTE: Male    VTE: Pt history of VTE    VTE: Family Hx of VTE      - No history of abnormal bleeding or antiplatelet use.  -ALL s/p BMT, chemo, radiation. Follows with BMT  -GVHD. Recent oral symptoms and steroids were increased. Surgeon is aware of the increase in steroids  -h/o PE. Completed anticoagulation.     Different anesthesia methods/types have been discussed with the patient, but they are aware that the final plan will be decided by the assigned anesthesia provider on the date of service.    The patient is optimized for their procedure. AVS with information on surgery time/arrival time, meds and NPO status given by nursing staff. No further diagnostic testing indicated.    Please refer to the physical examination documented by the anesthesiologist in the anesthesia record on the day of surgery.    Video-Visit Details    Type of service:  Video Visit    Provider received verbal consent for a Video Visit from the patient? Yes     Originating Location (pt. Location): Home    Distant Location (provider location):  Off-site  Mode of Communication:  Video Conference via [facility]  On the day of service:     Prep time: 13 " minutes  Visit time: 10 minutes  Documentation time: 3 minutes  ------------------------------------------  Total time: 26 minutes      Teressa Begum PA-C  Preoperative Assessment Center  [facility]  Phone: [phone]  Fax: [phone]

## 2024-01-08 NOTE — PROGRESS NOTES
Nik is a 63 year old who is being evaluated via a billable video visit.      How would you like to obtain your AVS? MyChart  If the video visit is dropped, the invitation should be resent by: Text to cell phone: [phone]          HPI           Review of Systems       Physical Exam

## 2024-01-09 ENCOUNTER — ONCOLOGY VISIT (OUTPATIENT)
Dept: TRANSPLANT | Facility: CLINIC | Age: 64
End: 2024-01-09
Attending: INTERNAL MEDICINE
Payer: MEDICARE

## 2024-01-09 ENCOUNTER — APPOINTMENT (OUTPATIENT)
Dept: LAB | Facility: CLINIC | Age: 64
End: 2024-01-09
Attending: INTERNAL MEDICINE
Payer: MEDICARE

## 2024-01-09 VITALS
RESPIRATION RATE: 16 BRPM | BODY MASS INDEX: 33.16 KG/M2 | DIASTOLIC BLOOD PRESSURE: 77 MMHG | OXYGEN SATURATION: 98 % | TEMPERATURE: 97.5 F | SYSTOLIC BLOOD PRESSURE: 138 MMHG | WEIGHT: 251.3 LBS | HEART RATE: 96 BPM

## 2024-01-09 DIAGNOSIS — R06.02 SHORTNESS OF BREATH: Primary | ICD-10-CM

## 2024-01-09 DIAGNOSIS — D89.813 GVHD AS COMPLICATION OF BONE MARROW TRANSPLANT (H): ICD-10-CM

## 2024-01-09 DIAGNOSIS — E78.2 MIXED HYPERLIPIDEMIA: ICD-10-CM

## 2024-01-09 DIAGNOSIS — T86.09 GVHD AS COMPLICATION OF BONE MARROW TRANSPLANT (H): ICD-10-CM

## 2024-01-09 DIAGNOSIS — C91.01 ACUTE LYMPHOBLASTIC LEUKEMIA (ALL) IN REMISSION (H): Primary | ICD-10-CM

## 2024-01-09 DIAGNOSIS — Z94.81 STATUS POST BONE MARROW TRANSPLANT (H): ICD-10-CM

## 2024-01-09 DIAGNOSIS — C91.01 ACUTE LYMPHOBLASTIC LEUKEMIA (ALL) IN REMISSION (H): ICD-10-CM

## 2024-01-09 LAB
ALBUMIN SERPL BCG-MCNC: 4 G/DL (ref 3.5–5.2)
ALP SERPL-CCNC: 108 U/L (ref 40–150)
ALT SERPL W P-5'-P-CCNC: 43 U/L (ref 0–70)
ANION GAP SERPL CALCULATED.3IONS-SCNC: 12 MMOL/L (ref 7–15)
AST SERPL W P-5'-P-CCNC: 27 U/L (ref 0–45)
BASOPHILS # BLD AUTO: 0 10E3/UL (ref 0–0.2)
BASOPHILS NFR BLD AUTO: 0 %
BILIRUB SERPL-MCNC: 0.7 MG/DL
BUN SERPL-MCNC: 42.9 MG/DL (ref 8–23)
CALCIUM SERPL-MCNC: 9.9 MG/DL (ref 8.8–10.2)
CHLORIDE SERPL-SCNC: 102 MMOL/L (ref 98–107)
CREAT SERPL-MCNC: 1.03 MG/DL (ref 0.67–1.17)
DEPRECATED HCO3 PLAS-SCNC: 23 MMOL/L (ref 22–29)
EGFRCR SERPLBLD CKD-EPI 2021: 81 ML/MIN/1.73M2
EOSINOPHIL # BLD AUTO: 0 10E3/UL (ref 0–0.7)
EOSINOPHIL NFR BLD AUTO: 0 %
ERYTHROCYTE [DISTWIDTH] IN BLOOD BY AUTOMATED COUNT: 17 % (ref 10–15)
GLUCOSE SERPL-MCNC: 184 MG/DL (ref 70–99)
HCT VFR BLD AUTO: 33.1 % (ref 40–53)
HGB BLD-MCNC: 11.2 G/DL (ref 13.3–17.7)
IMM GRANULOCYTES # BLD: 0.2 10E3/UL
IMM GRANULOCYTES NFR BLD: 2 %
LYMPHOCYTES # BLD AUTO: 1.5 10E3/UL (ref 0.8–5.3)
LYMPHOCYTES NFR BLD AUTO: 16 %
MCH RBC QN AUTO: 34.6 PG (ref 26.5–33)
MCHC RBC AUTO-ENTMCNC: 33.8 G/DL (ref 31.5–36.5)
MCV RBC AUTO: 102 FL (ref 78–100)
MONOCYTES # BLD AUTO: 0.3 10E3/UL (ref 0–1.3)
MONOCYTES NFR BLD AUTO: 3 %
NEUTROPHILS # BLD AUTO: 7.6 10E3/UL (ref 1.6–8.3)
NEUTROPHILS NFR BLD AUTO: 79 %
NRBC # BLD AUTO: 0.1 10E3/UL
NRBC BLD AUTO-RTO: 1 /100
PLATELET # BLD AUTO: 204 10E3/UL (ref 150–450)
POTASSIUM SERPL-SCNC: 4.3 MMOL/L (ref 3.4–5.3)
PROT SERPL-MCNC: 6.5 G/DL (ref 6.4–8.3)
RBC # BLD AUTO: 3.24 10E6/UL (ref 4.4–5.9)
SODIUM SERPL-SCNC: 137 MMOL/L (ref 135–145)
WBC # BLD AUTO: 9.6 10E3/UL (ref 4–11)

## 2024-01-09 PROCEDURE — 250N000011 HC RX IP 250 OP 636: Performed by: INTERNAL MEDICINE

## 2024-01-09 PROCEDURE — 99215 OFFICE O/P EST HI 40 MIN: CPT | Performed by: INTERNAL MEDICINE

## 2024-01-09 PROCEDURE — 90471 IMMUNIZATION ADMIN: CPT | Performed by: INTERNAL MEDICINE

## 2024-01-09 PROCEDURE — 250N000021 HC RX MED A9270 GY (STAT IND- M) 250: Performed by: INTERNAL MEDICINE

## 2024-01-09 PROCEDURE — 99213 OFFICE O/P EST LOW 20 MIN: CPT | Performed by: INTERNAL MEDICINE

## 2024-01-09 PROCEDURE — 94640 AIRWAY INHALATION TREATMENT: CPT | Performed by: INTERNAL MEDICINE

## 2024-01-09 PROCEDURE — 90750 HZV VACC RECOMBINANT IM: CPT | Performed by: INTERNAL MEDICINE

## 2024-01-09 PROCEDURE — 36591 DRAW BLOOD OFF VENOUS DEVICE: CPT | Performed by: INTERNAL MEDICINE

## 2024-01-09 PROCEDURE — 80053 COMPREHEN METABOLIC PANEL: CPT | Performed by: INTERNAL MEDICINE

## 2024-01-09 PROCEDURE — 85041 AUTOMATED RBC COUNT: CPT | Performed by: INTERNAL MEDICINE

## 2024-01-09 PROCEDURE — G0463 HOSPITAL OUTPT CLINIC VISIT: HCPCS | Mod: 25 | Performed by: INTERNAL MEDICINE

## 2024-01-09 PROCEDURE — 94642 AEROSOL INHALATION TREATMENT: CPT | Performed by: INTERNAL MEDICINE

## 2024-01-09 RX ORDER — PENTAMIDINE ISETHIONATE 300 MG/300MG
300 INHALANT RESPIRATORY (INHALATION)
Status: COMPLETED | OUTPATIENT
Start: 2024-01-09 | End: 2024-01-09

## 2024-01-09 RX ORDER — HEPARIN SODIUM (PORCINE) LOCK FLUSH IV SOLN 100 UNIT/ML 100 UNIT/ML
5 SOLUTION INTRAVENOUS ONCE
Status: COMPLETED | OUTPATIENT
Start: 2024-01-09 | End: 2024-01-09

## 2024-01-09 RX ORDER — ACYCLOVIR 400 MG/1
800 TABLET ORAL EVERY 12 HOURS
Qty: 120 TABLET | Refills: 3 | Status: SHIPPED | OUTPATIENT
Start: 2024-01-09 | End: 2024-05-09

## 2024-01-09 RX ORDER — ALBUTEROL SULFATE 0.83 MG/ML
2.5 SOLUTION RESPIRATORY (INHALATION)
Status: CANCELLED | OUTPATIENT
Start: 2024-01-14 | End: 2024-01-14

## 2024-01-09 RX ORDER — ALBUTEROL SULFATE 0.83 MG/ML
2.5 SOLUTION RESPIRATORY (INHALATION)
Status: DISCONTINUED | OUTPATIENT
Start: 2024-01-09 | End: 2024-01-09 | Stop reason: HOSPADM

## 2024-01-09 RX ORDER — ROSUVASTATIN CALCIUM 10 MG/1
10 TABLET, COATED ORAL DAILY
Qty: 60 TABLET | Refills: 3 | Status: SHIPPED | OUTPATIENT
Start: 2024-01-09 | End: 2024-01-15

## 2024-01-09 RX ORDER — PENTAMIDINE ISETHIONATE 300 MG/300MG
300 INHALANT RESPIRATORY (INHALATION)
Status: CANCELLED | OUTPATIENT
Start: 2024-01-14 | End: 2024-01-14

## 2024-01-09 RX ADMIN — PENTAMIDINE ISETHIONATE 300 MG: 300 INHALANT RESPIRATORY (INHALATION) at 14:16

## 2024-01-09 RX ADMIN — Medication 5 ML: at 13:52

## 2024-01-09 RX ADMIN — ZOSTER VACCINE RECOMBINANT, ADJUVANTED 0.5 ML: KIT at 16:35

## 2024-01-09 ASSESSMENT — PAIN SCALES - GENERAL: PAINLEVEL: MILD PAIN (2)

## 2024-01-09 NOTE — NURSING NOTE
Administrations This Visit           zoster vaccine recombinant adjuvanted (SHINGRIX) injection 0.5 mL Admin Date  01/09/2024 Action  $Given             Shingrix vaccine administered intramuscularly in R deltoid without complication. See MAR for details.    Catherine Chavez RN

## 2024-01-09 NOTE — PROGRESS NOTES
Nik HARRY Vegasshabana was seen today for a Pentamidine nebulizer tx ordered by Dr. Avila Tobar.     Patient was first given 4 puffs of albuterol MDI, after which Pentamidine 300 mg (Lot # [id number]) mixed with 6cc Sterile H20 was administered through a filtered nebulizer.     Pre-treatment: SpO2 = 98%   HR = 84   BBS = Clear   Post-treatment: SpO2 = 98%  HR = 96  BBS = Clear     No adverse side effects noted by the patient.     This service today was provided under Dr. Willis, who was available if needed.      Procedure was completed by Deborah Lopez RRT.

## 2024-01-09 NOTE — PROGRESS NOTES
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    HPI:    Here for eval of worsening oral lesions/pain, concern for GVHD flare vs other. On increased dose of pred 40mg/d (day 3; usual 10mg/4mg). No change yet. Started a few days after last visit when he received vaccines and stopped prophy ACV. Eyes stable. Wt down d/t decreased oral intake 2/2 pain. No n/v/d/c. No fevers. Chronic runny nose and cough, lingering the past 1.5mo.       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    HPI:    Nik returns to clinic for follow up. Treated recently for HSV with valtrex, mouth better, able to eat now.  Doing dexamethasone swish and spit 3-4 times a day that is helping.  Vision blurry and looks forward to his cataract surgery next week and then in February.  Using eyedrops 3-4 times a day only down from more than 10 times per day earlier in the summer, not using scleral lenses regularly but believes ocular lenses have made a significant difference in his quality of life and ocular symptoms.  No new respiratory symptoms.  No chest pain.  No nausea vomiting or diarrhea.  No bleeding and no headaches.    Review of Systems                                                                                                                                         10 point Review of systems was negative except as detailed above     PHYSICAL EXAM                                                                                                                                                   KPS: 70      Wt Readings from Last 4 Encounters:   01/09/24 114 kg (251 lb 4.8 oz)   11/14/23 110.2 kg (243 lb)   11/14/23 110.3 kg (243 lb 1.6 oz)   10/16/23 109.8 kg (242 lb 1.6 oz)      General: NAD.  HEENT: sclera anicteric, injected conjunctivae.   11/14/2023 No noted lichenoid changes in the oral mucosa previous prominent area of healed ulcers in the right/left mid buccal mucosa are less prominent with overall improved appearence and more normal appearing mucosal tissue, no ulcers seen.    1/9/2024 mild lichenoid changes and healing ulcers on both buccal mucosa no open discolored lesions noted  Lungs:  CTA b/l  no wheezes  CV: RRR  no gallop  Abd; soft, NABS, protuberant  Ext/ Skin: Skin bruising on bilateral forearms,  fainting of previous hyperpigmented areas of previously known cGVHD  LE trace bilateral edema in lower extremities    cGVHD therapy started on February 24 2022  - Baseline NIH scoring 2/24/2022 : skin 2 maculopapular rash/erythema with no sclerotic features, mouth score 1 mild symptoms with lichenoid changes less than 25%, eyes score 2 moderate symptoms without new visual impairments due to KCS requiring lubricant eyedrops more than 3 times a day, overall mild scale for her provider  - 3/25/2022 1 month NIH treatment assessment on PQRST: skin 2, mouth score 1 mild symptoms with NO lichenoid changes, eyes score 2 moderate symptoms w requiring lubricant eyedrops more than 3 times a day, liver score 1 ALT 3.7x ULN and nl bilirubin   - 3/31  Mouth clear; eyes minimal LFT still abn; no joint or new GI sx  - 4/28 Mouth clear, Left>R eye is mild sclera erythema, lids are pink and irritated appearing, LFT's slow improvement, no new joint, skin, or GI symptoms.   -5/11 mouth with mild erythema but no lichenoid changes, eyes using eyedrops 4-6 times a day score of 2, LFTs significantly improved from baseline slight elevation in alk phos and AST with normal bilirubin, skin with hyperpigmentation from old acute GVHD no active skin rashes, no GI symptoms no musculoskeletal complaints.  -5/26 Mouth with very slight lichenoid changes, erythema.  Eyes still using eyedrops 4-6x per day.  LFTs essentially normal with slight elevation in alk phos.  Skin with  hyperpigmentation from old acute GVHD, no active rashes, no GI or MSK concerns.  Skin 0, mouth 0 but with lichenoid changes, eyes 2  -6/7/22 Mouth with no lichenoid changes, erythema.  Eyes still using eyedrops 4-6x per day.  LFTs essentially normal with slight elevation in alk phos.  Skin with hyperpigmentation from old acute GVHD, no active rashes, no GI or MSK concerns.  Skin 0, mouth 0, eyes 2     -6 month PQRST assessment 9/6/2022: eyes 3, mouth 0 for symptoms, 25% lichenoid changes (1), liver/GI/skin 0    11/8/2022, 11/22/2022, 12/13/22 cGVHD NIG scoring eyes 3, no other organ involvement clinically  3/28/23 cGVHD NIG scoring eyes 3, no other organ involvement clinically  5/2/23 cGVHD NIG scoring eyes 3, oral 1, no other organ involvement clinically  6/13/23 cGVHD NIG scoring eyes 3, oral 1, no other organ involvement clinically  8/15/23 cGVHD NIG scoring eyes 3 (down to 3-4 times eye drop from >10 but still using scleral lenses), oral 1, no other organ involvement clinically  10/10/2023 cGVHD NIG scoring eyes 2-3 (down to 3-4 times eye drop from >10 but still using scleral lenses), oral 1, no other organ involvement clinically  11/14/2023 cGVHD NIG scoring eyes 2-3 (down to 3-4 times eye drop from >10 but still using scleral lenses), oral 1, no other organ involvement clinically  1/9/2023 cGVHD NIG scoring eyes 2-3 (down to 3-4 times eye drop from >10 but still using scleral lenses), oral 2, no other organ involvement clinically    Lab:    Lab Results   Component Value Date    WBC 9.6 01/09/2024    ANEU 3.5 10/26/2021    HGB 11.2 (L) 01/09/2024    HCT 33.1 (L) 01/09/2024     01/09/2024     01/09/2024    POTASSIUM 4.3 01/09/2024    CHLORIDE 102 01/09/2024    CO2 23 01/09/2024     (H) 01/09/2024    BUN 42.9 (H) 01/09/2024    CR 1.03 01/09/2024    MAG 2.0 11/14/2023    INR 0.90 12/01/2022    BILITOTAL 0.7 01/09/2024    AST 27 01/09/2024    ALT 43 01/09/2024    ALKPHOS 108 01/09/2024     PROTTOTAL 6.5 01/09/2024    ALBUMIN 4.0 01/09/2024 4/28/2023    MRI Lumbar spine  1.  The previously seen ventral epidural abscess has resolved with small amount of residual ventral epidural phlegmon.   2.  Marrow edema about the L5-S1 endplates has mildly worsened within new rim-enhancing subchondral lesion in the left S1 superior endplate. This could reflect progression of osteomyelitis.      MRI hip right  1.  No evidence of septic arthritis of the right hip joint.   2.  There is degeneration and likely tearing of the right acetabular labrum anterosuperiorly, and probably low-grade chondromalacia of the right hip joint.   3.  Abnormality at L5-S1 compatible with known discitis-osteomyelitis.    ASSESSMENT AND PLAN   Nik Nava is a 63 year old male with Ph Pos ALL, day 882 s/p sib allo stem cell transplant    Day -6 (8/4): flu/cy  Day -5 through day -2 (8/5-8/8): flu  Day -1 (8/9): TBI  Day 0 (8/10): transplant     1.  Acute lymphoblastic leukemia, [place] chromosome positive in CR, MRD neg. S/p allo sib PBSCT. (ABO matched)  HCT-CI score: 3 (prior solid tumor)  Day +100 bone marrow biopsy is 100% donor with no morphologic or flow cytometric evidence of leukemia BCR abl is pending.  - Day +180 restaging BM bx- still in CR  100% donor;  2/16/22 BCR ABL major breakpoint undetectable.   - 1 year restaging 8/18/2022: Markedly hypocellular bone marrow [less than 10% cellular] with maturing trilineage hematopoiesis and no definite morphologic or immunophenotypic evidence for involvement by B lymphoblastic leukemia. BCRABL1 PCR not detected, bone marrow % donor. FISH NORMAL No evidence of BCR-ABL1 fusion  - Maintenance with TKI posttransplantation given his Ph positive ALL that have not been started previously presumed secondary to multiple active issues specifically infections and COVID.  Per Avila Tobar: will reconsider this patient will think about whether this is something he would like to consider  he was previously on Dasatinib, we would start on a low dose and increase as tolerated.  This is on hold now pending active GVHD therapy, we discussed on this visit 10/18 in agreement with holding off.  - 2 year restaging 8/7/2023 BMB: - No morphologic or immunophenotypic evidence of recurrent/persistent B-lymphoblastic leukemia. Slightly hypocellular marrow (10-20% estimated cellularity, patchy and variable), with trilineage hematopoetiic maturation and less than 2% blasts. Peripheral blood showing slight normocytic normochromic anemia and slight thrombocytopenia. BM % donor. FISH No evidence of BCR::ABL1 fusion /CG No recipient (male) cells  or cells with a t(9;22) or other clonal chromosomal abnormality were  detected among the 20 metaphases analyzed.      2.  HEME: CBC stable.   3/2023 iron low 28, iron saturation index 10%, transferrin 225, ferritin 1381 down trending (in retrospect that was the time of epidural abscess as well).  B12, folate normal.  High copper and zinc borderline low, 5/2023 started zinc.  Started iron replacement in 4/2023, recheck iron profile on follow up more consistent with AOCD, discontinued iron. Iron 11/14/23 WNL. Note ferritin elevation is in the setting of discitis/OM, will reassess.    3.  FEN/Renal: Creatinine 0.97, s/p nephrectormy, nephrology following     4.    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   1/9/2/2024 cGVHD NIG scoring eyes 2-3, oral 1, no other organ involvement clinically   #Skin GVHD- history of biopsy proven GVHD of the skin 8/30/2021; resolved.   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 WNL today   # Ocular GVHD: follows with ophthalmology. Maxitrol to lids, continue refreshing drops QID. Score 3, but down to 3-4 times eye drops from >10/day. Not needing Sl all the time.  Scheduled for cataract surgeries.  Followed closely by Dr. 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  Lichenoid changes have largely resolved with no open ulcers since 11/8/2022, December 2020 for flare of oral GVHD in the setting of oral herpes reactivation    Previous therapies  Tacrolimus-previously decided to stay on same dose, no checks of levels if clinically stable, and no need switch to sirolimus. (keep tac low as gvhd is quiet and viremia). 5/10 level 45 with starting of COVID therapy and D-D intractions (Paxlovid for COVID19, which has a drug interaction with tacrolimus-Ritonavir increases serum levels of tacrolimus).   Tacrolimus level subtherapeutic, increase to 2.5 mg twice daily recently, 8/23/2022 instructed to change tacrolimus to 2 mg twice daily. 9/6 with mild NEGRA, hyperkalemia, hypomagnesemia, and reports some tremors and cramps, suspect tacrolimus to be high therefore empirically decreased to 1.5 twice daily, skip morning dose of tacrolimus and took 2 mg today after his afternoon labs. Decrease to 1mg BID on Saturday.  Sirolimus  9/21 despite fluids and a dose adjustment of tacrolimus patient seem to have persistence NORBERTO related NEGRA, therefore after discussion with the patient decision was made to transition to sirolimus that was started on 9/21, patient initially seem to tolerate this well with normalization of kidney function  10/11 presented with flares of ocular and oral GVHD, fluid retention and gain of 5-6 kg, lower extremity edema, abdominal distention, total protein to creatinine ratio elevated at 0.4, in addition to elevation in BUN and creatinine, HTN.  Picture most consistent with sirolimus related side effects.  Less likely that the patient will tolerate even a lower level of sirolimus.  Therefore the patient was instructed to stop sirolimus, last dose on 10/10. 10/14 level 3.5 appropriately trending down.     Corticosteroids  3/1-3/16: prednisone 100mg every day  3/17 75mg every day   3/31 75 alt with 50  4/6: 75 alt with 25mg  every other day  4/12 pred tapered to 60 alternating with 25mg   4/15 In setting of viruses trying to reactivate,GVHD stable: Taper 60/15mg alternating.  4/20 decrease pred further to 50/10.    4/25 taper pred to 50/0 every other day as long as symptoms stable.   5/2 Pred decrease 40/0 alternating every other day.   5/13 decrease to 30/0  5/20 decrease to 20/0 then slowly by 5 mg weekly  6/7 decrease to 10/0  Went up to 15/0 with mild increased LFTs  8/23/2022 increased to 20/0, with persistence of oral ulcers and active ocular GVHD.  Discussed with the patient the importance of stopping chewing of nicotine gums for prolonged hours as that would not help with the healing of the oral ulcers.  I discussed with the patient alternatives of nicotine patches that he will reconsider and let me know.  9/6 decreased to 15 alternating with 0  9/13 decreased to 10 alternating with 0  9/21 discontinued tacrolimus given persistent NEGRA and single kidney and start the patient on sirolimus without loading dose.  We will get a list of possible side effects and adverse events from the Pharm.D. see sirolimus section above.  10/11 GVHD flare. Sirolimus stopped. Resume prednisone 40 mg daily as of 10/12, no change with mildly worsening eye pain 10/14  Reduce pred to 35mg 10/25 and 25mg 11/1 11/8 20 mg   11/22 15 mg  12/13/22 10 mg (plan to taper to 5mg then slow taper 1 mg per month given long pred history)  2/23/23 lower from 5 mg to 4 mg for the next month  3/28/2023 - 5/2/2023- 8/15/2023 continue on 10/4 given adrenal insufficieny with recent am cortisol check and clinical symptoms on taper to lower dose of 4 mg daily  -12/2023 had oral GVHD flare started on Prednisone 40 mg,  start taper to 40 and 30 mg alternating for 1 week then 30 mg daily for 1 week, then 30 and 20 alternating for 1 week, then 20 for 1 week till he sees me.    Belumosudil  Patient intolerant/with disease flares on tacrolimus and sirolimus see above.  Discussed  with the patient the different options for therapy of chronic GVHD that are FDA approved.  Given the side effect profiles after discussing in detail and given the least nephrotoxicity and expected response, taking into account other metabolic effect as well.  We will proceed with prior authorization with belumosudil.  Patient was given material to review for possible expected adverse events and side effects with both Belumosodil and ruxolitinib.   --- Started on 10/18/2022 - skin/liver/oral GVHD inactive, and occular symptoms controlled/symptomatic improvement.   --- 10/10/2023 will hold belumosodil given recurrent infections ans significant improvement in symptoms with no end organ damage after discussions with him and his wife. Will optimize topical treatment with scleral lenses, eyedrops ans dex s/sp int he interim to avoid flares. Will readdress need to restart systemic treatment pending infectious resolution and symptoms.         5.  ID:   # Recent history of Epidural abscess: Followed by Dr. Candelario ID, plan to be on IV ceftriaxone for at least 6 weeks course through 5/11/2023-to be determined on further follow-up.  See imaging with MRI follow-up as above.   #Recurrent discitis/OM still on treatment through October 2023, cx negative, managed with ID locally.  3/30/2023 blood culture with Staph epidermidis  3/31/2023 BONE, L5, FLUOROSCOPICALLY-GUIDED NEEDLE BIOPSY: Benign bone with trilineage hematopoiesis (normal) Negative for neoplasm Negative for acute osteomyelitis. DISC, L5-S1, ASPIRATION FOR CYTOLOGY: Acellular debris; no cellular material present for evaluation     #History of COVID x2 last 1/2023 and influenza    Treated with Paxlovid with persistent fatigue but no active respiratory symptoms  received his influenza annual vaccine as well as COVID boosters locally 11/16/2022     #History of pulmonary infiltrates:   4/20 CT chest with new RUL infiltrate. Highly immunocompromised. 4/22 Fungitell/asp GM  were neg. BAL 4/29 NGTD, increased micafungin to 150mg IV daily-- f/u 6/1 Dr Garcia CT with mixed results, followed with Dr. Garcia August 2022 with resolution of pulmonary nodules and normalization of LFTs we will switch patient will switch patient to posaconazole prophylactic dose and monitor LFTs and any infectious concerns closely. Will continue till we determine durable discontinuation of IST given high risk history.      #History of CMV viremia:    - CMV viremia up to 1100. 4/15 started valcyte 900mg PO BID. 4/20 CMV <137, 5/10 ND, decreased to 450mg BID 4/30 - continue 4 weeks as long as CMV <137 till 5/28 + weekly CMV. Switched to acyclovir after completion of therapy 5/28/2022. recheck 3/1/23 ND     - PPx:   ACV, will start Shingrix vaccination series and discontinue in 2 months.  Patient developed oral herpes reactivation after stopping acyclovir therefore resumed on 1/9/2023  Posaconazole (previously on micafungin with LFts abl, hx of pulmonary nodules needign treatment dose). 11/14/2023 discontinue and check CT in 2 months given history completed December 2023 with no concerns for infections or nodular lesions.  Pentamidine, last 1/20/2024. Will schedule next dose 2/9/2024.  11/2023 Discontinue azithro 250mg daily (stopped levaquin due to possible tendonitis).  He is on Augmentin and doxycycline for discitis/osteomyelitis.  IgG1/2023 364, 4/2023 460      - EBV viremia: 4/20 CAP CT (w/ contrast): No adenopathy. S/p 4/22 and 5/4/22 rituximab 375mg/m2 5/10 ND x2, 6/7 ND, 8/18/2022 971, 3/28/22 843  S/p covid vaccine series 12/2021  S/p evusheld     - vaccinations: Previously deferred annual vaccines in the setting of GVHD and immunosuppression therapy. 11/14/2023 we will start vaccination series, including Shingrix, COVID-vaccine. 1/9/2023 Shingrix vaccine today.  Needs RSV locally.  Will schedule for the rest of his boosters on 2/9 per his request.    12/2023:HSV oral lesions; PCR positive. Stopped ACV last  month and lesions appears soon thereafter. Empirically Rx Valtrex. mild lingering URI sx; RVP + rhino (12/21); supportive cares. (CCT 12/1/23 neg).started on pred 40mg/d.  Completed Valtrex course and put back on acyclovir restarted 1/20/2024    6. Endo:   - Hx of graves disease; On synthroid follows with endocrine  - HLD: 11/2022 cholesterol 355, triglycerides 153, -- 11/8 started rosuvastatin 5 mg daily, 11/22 CPK within normal, 5/2/2023 repeat lipid profile cholesterol down to 202, triglycerides 191, LDL now normal at 95.  We will continue same dose of rosuvastatin and add fish oil 1g BID (on hold). Repeated August with PCP     7. CV:   - Hypertension history   - TTE most recently 10/2022     8. GI:   -Omeprazole for heartburn  -LFTs as above, now normal. Off ursodiol     9. Psych:   - Situational anxiety - lexapro 10mg daily.   - Insomnia: worse on steroids. Ativan, trazadone, melatonin     10. MSK:   -History of left food drop, PT. Occasional muscle cramps discussed optimizing vitamin D levels and considering vitamin D, some of the symptomatology of muscle cramps are likely related to chronic GVHD.  No muscle cramps reported today.  -4/2023 new tearing of the right acetabular labrum anterosuperiorly referred to to orthopedic surgery.       11. HCM/Age appropriate cancer screening:  -Discussed with the patient importance of age-appropriate cancer screening including colonoscopies, he is due for this.  -Discussed importance of at least once a year vitamin D level check, 8/18/2022 wnl  -Screening for secondary iron overload, see heme section above  -Yearly TSH 2022 wnl; yearly lipid panel - see GI section above  -9/6/2022 DEXA scan Based on BMD diagnosis is consistent with normal bone density based on WHO criteria Ref. 1   -PFTs 9/20/22, see above. 9/21/2023 PFTs The FVC, FEV1, FEV1/FVC ratio and AKZ33-70% are within normal limits. FVC 99% (108), FEV1 91%, DLCO uncorrected 91%.  Repeat PFTs in 4 to 6  months.  -Metabolic other, 8/2022 testosterone within normal  -11/22/2022 discussed with the patient the challenges and the stresses of chronic illness and healthcare fatigue.  Patient had previously been on Lexapro that we restarted confirmed with pharmacy that there are no drug drug interactions.  Additionally we will referred patient to PMNR to help with physical rehab and strength to help with fatigue.  Our social workers will also reach out to Nik and his wife for further resources including support groups for patients with chronic illness and fatigue post transplant.  -Referral to palliative care for symptom and pain management including insomnia     Plan:  -2/27 apt with me, 2/9 pentamidine, vaccines and port flush  -Shingles vaccine today  -Continue off belumosodil  -Continue dexamethasone to 3 times a day from 4 times a day and monitor oral symptoms  -Prednisone 40 mg,  start taper to 40 and 30 mg alternating for 1 week then 30 mg daily for 1 week, then 30 and 20 alternating for 1 week, then 20 for 1 week till he sees me.  - follow with nephrology, endocrinology, PCP, follow-up locally with infectious disease, neurosurgery and pain management for management of osteomyelitis and pain control. PCP follow up for lipid profile, age appropriate cancer screening      I spent 45 minutes in the care of this patient today, which included time necessary for preparation for the visit, obtaining history, ordering medications/tests/procedures as medically indicated, review of pertinent medical literature, counseling of the patient, communication of recommendations to the care team, and documentation time.

## 2024-01-09 NOTE — LETTER
1/9/2024         RE: Nik Nava  [id number] [facility] [id number]        Dear Colleague,    Thank you for referring your patient, Nik Nava, to the [facility] BLOOD AND MARROW TRANSPLANT PROGRAM [place]. Please see a copy of my visit note below.      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    HPI:    Here for eval of worsening oral lesions/pain, concern for GVHD flare vs other. On increased dose of pred 40mg/d (day 3; usual 10mg/4mg). No change yet. Started a few days after last visit when he received vaccines and stopped prophy ACV. Eyes stable. Wt down d/t decreased oral intake 2/2 pain. No n/v/d/c. No fevers. Chronic runny nose and cough, lingering the past 1.5mo.       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    HPI:    Nik returns to clinic for follow up. Treated recently for HSV with valtrex, mouth better, able to eat now.  Doing dexamethasone swish and spit 3-4 times a day that is helping.  Vision blurry and looks forward to his cataract surgery next week and then in February.  Using eyedrops 3-4 times a day only down from more than 10 times per day earlier in the summer, not using scleral lenses regularly but believes ocular lenses have made a significant difference in his quality of life and ocular symptoms.  No new respiratory symptoms.  No chest pain.  No nausea vomiting or diarrhea.  No bleeding and no headaches.    Review of Systems                                                                                                                                         10 point Review of systems was negative except as detailed above     PHYSICAL EXAM                                                                                                                                                    KPS: 70      Wt Readings from Last 4 Encounters:   01/09/24 114 kg (251 lb 4.8 oz)   11/14/23 110.2 kg (243 lb)   11/14/23 110.3 kg (243 lb 1.6 oz)   10/16/23 109.8 kg (242 lb 1.6 oz)      General: NAD.  HEENT: sclera anicteric, injected conjunctivae.  11/14/2023 No noted lichenoid changes in the oral mucosa previous prominent area of healed ulcers in the right/left mid buccal mucosa are less prominent with overall improved appearence and more normal appearing mucosal tissue, no ulcers seen.    1/9/2024 mild lichenoid changes and healing ulcers on both buccal mucosa no open discolored lesions noted  Lungs:  CTA b/l  no wheezes  CV: RRR  no gallop  Abd; soft, NABS, protuberant  Ext/ Skin: Skin bruising on bilateral forearms,  fainting of previous hyperpigmented areas of previously known cGVHD  LE trace bilateral edema in lower extremities    cGVHD therapy started on February 24 2022  - Baseline NIH scoring 2/24/2022 : skin 2 maculopapular rash/erythema with no sclerotic features, mouth score 1 mild symptoms with lichenoid changes less than 25%, eyes score 2 moderate symptoms without new visual impairments due to KCS requiring lubricant eyedrops more than 3 times a day, overall mild scale for her provider  - 3/25/2022 1 month NIH treatment assessment on PQRST: skin 2, mouth score 1 mild symptoms with NO lichenoid changes, eyes score 2 moderate symptoms w requiring lubricant eyedrops more than 3 times a day, liver score 1 ALT 3.7x ULN and nl bilirubin   - 3/31  Mouth clear; eyes minimal LFT still abn; no joint or new GI sx  - 4/28 Mouth clear, Left>R eye is mild sclera erythema, lids are pink and irritated appearing, LFT's slow improvement, no new joint, skin, or GI symptoms.   -5/11 mouth with mild erythema but no lichenoid changes, eyes using eyedrops 4-6 times a day score of 2, LFTs significantly improved from baseline slight elevation in alk phos and AST with normal bilirubin, skin with  hyperpigmentation from old acute GVHD no active skin rashes, no GI symptoms no musculoskeletal complaints.  -5/26 Mouth with very slight lichenoid changes, erythema.  Eyes still using eyedrops 4-6x per day.  LFTs essentially normal with slight elevation in alk phos.  Skin with hyperpigmentation from old acute GVHD, no active rashes, no GI or MSK concerns.  Skin 0, mouth 0 but with lichenoid changes, eyes 2  -6/7/22 Mouth with no lichenoid changes, erythema.  Eyes still using eyedrops 4-6x per day.  LFTs essentially normal with slight elevation in alk phos.  Skin with hyperpigmentation from old acute GVHD, no active rashes, no GI or MSK concerns.  Skin 0, mouth 0, eyes 2     -6 month PQRST assessment 9/6/2022: eyes 3, mouth 0 for symptoms, 25% lichenoid changes (1), liver/GI/skin 0    11/8/2022, 11/22/2022, 12/13/22 cGVHD NIG scoring eyes 3, no other organ involvement clinically  3/28/23 cGVHD NIG scoring eyes 3, no other organ involvement clinically  5/2/23 cGVHD NIG scoring eyes 3, oral 1, no other organ involvement clinically  6/13/23 cGVHD NIG scoring eyes 3, oral 1, no other organ involvement clinically  8/15/23 cGVHD NIG scoring eyes 3 (down to 3-4 times eye drop from >10 but still using scleral lenses), oral 1, no other organ involvement clinically  10/10/2023 cGVHD NIG scoring eyes 2-3 (down to 3-4 times eye drop from >10 but still using scleral lenses), oral 1, no other organ involvement clinically  11/14/2023 cGVHD NIG scoring eyes 2-3 (down to 3-4 times eye drop from >10 but still using scleral lenses), oral 1, no other organ involvement clinically  1/9/2023 cGVHD NIG scoring eyes 2-3 (down to 3-4 times eye drop from >10 but still using scleral lenses), oral 2, no other organ involvement clinically    Lab:    Lab Results   Component Value Date    WBC 9.6 01/09/2024    ANEU 3.5 10/26/2021    HGB 11.2 (L) 01/09/2024    HCT 33.1 (L) 01/09/2024     01/09/2024     01/09/2024    POTASSIUM 4.3  01/09/2024    CHLORIDE 102 01/09/2024    CO2 23 01/09/2024     (H) 01/09/2024    BUN 42.9 (H) 01/09/2024    CR 1.03 01/09/2024    MAG 2.0 11/14/2023    INR 0.90 12/01/2022    BILITOTAL 0.7 01/09/2024    AST 27 01/09/2024    ALT 43 01/09/2024    ALKPHOS 108 01/09/2024    PROTTOTAL 6.5 01/09/2024    ALBUMIN 4.0 01/09/2024 4/28/2023    MRI Lumbar spine  1.  The previously seen ventral epidural abscess has resolved with small amount of residual ventral epidural phlegmon.   2.  Marrow edema about the L5-S1 endplates has mildly worsened within new rim-enhancing subchondral lesion in the left S1 superior endplate. This could reflect progression of osteomyelitis.      MRI hip right  1.  No evidence of septic arthritis of the right hip joint.   2.  There is degeneration and likely tearing of the right acetabular labrum anterosuperiorly, and probably low-grade chondromalacia of the right hip joint.   3.  Abnormality at L5-S1 compatible with known discitis-osteomyelitis.    ASSESSMENT AND PLAN   Nik Nava is a 63 year old male with Ph Pos ALL, day 882 s/p sib allo stem cell transplant    Day -6 (8/4): flu/cy  Day -5 through day -2 (8/5-8/8): flu  Day -1 (8/9): TBI  Day 0 (8/10): transplant     1.  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 S/p allo sib PBSCT. (ABO matched)  HCT-CI score: 3 (prior solid tumor)  Day +100 bone marrow biopsy is 100% donor with no morphologic or flow cytometric evidence of leukemia BCR abl is pending.  - Day +180 restaging BM bx- still in CR  100% donor;  2/16/22 BCR ABL major breakpoint undetectable.   - 1 year restaging 8/18/2022: Markedly hypocellular bone marrow [less than 10% cellular] with maturing trilineage hematopoiesis and no definite morphologic or immunophenotypic evidence for involvement by B lymphoblastic leukemia. BCRABL1 PCR not detected, bone marrow % donor. FISH NORMAL No evidence of BCR-ABL1 fusion  - Maintenance with  TKI posttransplantation given his Ph positive ALL that have not been started previously presumed secondary to multiple active issues specifically infections and COVID.  Per Avila Tobar: will reconsider this patient will think about whether this is something he would like to consider he was previously on Dasatinib, we would start on a low dose and increase as tolerated.  This is on hold now pending active GVHD therapy, we discussed on this visit 10/18 in agreement with holding off.  - 2 year restaging 8/7/2023 BMB: - No morphologic or immunophenotypic evidence of recurrent/persistent B-lymphoblastic leukemia. Slightly hypocellular marrow (10-20% estimated cellularity, patchy and variable), with trilineage hematopoetiic maturation and less than 2% blasts. Peripheral blood showing slight normocytic normochromic anemia and slight thrombocytopenia. BM % donor. FISH No evidence of BCR::ABL1 fusion /CG No recipient (male) cells  or cells with a t(9;22) or other clonal chromosomal abnormality were  detected among the 20 metaphases analyzed.      2.  HEME: CBC stable.   3/2023 iron low 28, iron saturation index 10%, transferrin 225, ferritin 1381 down trending (in retrospect that was the time of epidural abscess as well).  B12, folate normal.  High copper and zinc borderline low, 5/2023 started zinc.  Started iron replacement in 4/2023, recheck iron profile on follow up more consistent with AOCD, discontinued iron. Iron 11/14/23 WNL. Note ferritin elevation is in the setting of discitis/OM, will reassess.    3.  FEN/Renal: Creatinine 0.97, s/p nephrectormy, nephrology following     4.    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   1/9/2/2024 cGVHD NIG scoring eyes 2-3, oral 1, no other organ involvement clinically   #Skin GVHD- history of biopsy proven GVHD of the skin 8/30/2021; resolved.   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 WNL today   #  Ocular GVHD: follows with ophthalmology. Maxitrol to lids, continue refreshing drops QID. Score 3, but down to 3-4 times eye drops from >10/day. Not needing Sl all the time.  Scheduled for cataract surgeries.  Followed closely by Dr. 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  Lichenoid changes have largely resolved with no open ulcers since 11/8/2022, December 2020 for flare of oral GVHD in the setting of oral herpes reactivation    Previous therapies  Tacrolimus-previously decided to stay on same dose, no checks of levels if clinically stable, and no need switch to sirolimus. (keep tac low as gvhd is quiet and viremia). 5/10 level 45 with starting of COVID therapy and D-D intractions (Paxlovid for COVID19, which has a drug interaction with tacrolimus-Ritonavir increases serum levels of tacrolimus).   Tacrolimus level subtherapeutic, increase to 2.5 mg twice daily recently, 8/23/2022 instructed to change tacrolimus to 2 mg twice daily. 9/6 with mild NEGRA, hyperkalemia, hypomagnesemia, and reports some tremors and cramps, suspect tacrolimus to be high therefore empirically decreased to 1.5 twice daily, skip morning dose of tacrolimus and took 2 mg today after his afternoon labs. Decrease to 1mg BID on Saturday.  Sirolimus  9/21 despite fluids and a dose adjustment of tacrolimus patient seem to have persistence NORBERTO related NEGRA, therefore after discussion with the patient decision was made to transition to sirolimus that was started on 9/21, patient initially seem to tolerate this well with normalization of kidney function  10/11 presented with flares of ocular and oral GVHD, fluid retention and gain of 5-6 kg, lower extremity edema, abdominal distention, total protein to creatinine ratio elevated at 0.4, in addition to elevation in BUN and creatinine, HTN.  Picture most consistent with sirolimus related side effects.  Less likely that the patient will tolerate  even a lower level of sirolimus.  Therefore the patient was instructed to stop sirolimus, last dose on 10/10. 10/14 level 3.5 appropriately trending down.     Corticosteroids  3/1-3/16: prednisone 100mg every day  3/17 75mg every day   3/31 75 alt with 50  4/6: 75 alt with 25mg every other day  4/12 pred tapered to 60 alternating with 25mg   4/15 In setting of viruses trying to reactivate,GVHD stable: Taper 60/15mg alternating.  4/20 decrease pred further to 50/10.    4/25 taper pred to 50/0 every other day as long as symptoms stable.   5/2 Pred decrease 40/0 alternating every other day.   5/13 decrease to 30/0  5/20 decrease to 20/0 then slowly by 5 mg weekly  6/7 decrease to 10/0  Went up to 15/0 with mild increased LFTs  8/23/2022 increased to 20/0, with persistence of oral ulcers and active ocular GVHD.  Discussed with the patient the importance of stopping chewing of nicotine gums for prolonged hours as that would not help with the healing of the oral ulcers.  I discussed with the patient alternatives of nicotine patches that he will reconsider and let me know.  9/6 decreased to 15 alternating with 0  9/13 decreased to 10 alternating with 0  9/21 discontinued tacrolimus given persistent NEGRA and single kidney and start the patient on sirolimus without loading dose.  We will get a list of possible side effects and adverse events from the Pharm.D. see sirolimus section above.  10/11 GVHD flare. Sirolimus stopped. Resume prednisone 40 mg daily as of 10/12, no change with mildly worsening eye pain 10/14  Reduce pred to 35mg 10/25 and 25mg 11/1 11/8 20 mg   11/22 15 mg  12/13/22 10 mg (plan to taper to 5mg then slow taper 1 mg per month given long pred history)  2/23/23 lower from 5 mg to 4 mg for the next month  3/28/2023 - 5/2/2023- 8/15/2023 continue on 10/4 given adrenal insufficieny with recent am cortisol check and clinical symptoms on taper to lower dose of 4 mg daily  -12/2023 had oral GVHD flare started on  Prednisone 40 mg,  start taper to 40 and 30 mg alternating for 1 week then 30 mg daily for 1 week, then 30 and 20 alternating for 1 week, then 20 for 1 week till he sees me.    Belumosudil  Patient intolerant/with disease flares on tacrolimus and sirolimus see above.  Discussed with the patient the different options for therapy of chronic GVHD that are FDA approved.  Given the side effect profiles after discussing in detail and given the least nephrotoxicity and expected response, taking into account other metabolic effect as well.  We will proceed with prior authorization with belumosudil.  Patient was given material to review for possible expected adverse events and side effects with both Belumosodil and ruxolitinib.   --- Started on 10/18/2022 - skin/liver/oral GVHD inactive, and occular symptoms controlled/symptomatic improvement.   --- 10/10/2023 will hold belumosodil given recurrent infections ans significant improvement in symptoms with no end organ damage after discussions with him and his wife. Will optimize topical treatment with scleral lenses, eyedrops ans dex s/sp int he interim to avoid flares. Will readdress need to restart systemic treatment pending infectious resolution and symptoms.         5.  ID:   # Recent history of Epidural abscess: Followed by Dr. Candelario ID, plan to be on IV ceftriaxone for at least 6 weeks course through 5/11/2023-to be determined on further follow-up.  See imaging with MRI follow-up as above.   #Recurrent discitis/OM still on treatment through October 2023, cx negative, managed with ID locally.  3/30/2023 blood culture with Staph epidermidis  3/31/2023 BONE, L5, FLUOROSCOPICALLY-GUIDED NEEDLE BIOPSY: Benign bone with trilineage hematopoiesis (normal) Negative for neoplasm Negative for acute osteomyelitis. DISC, L5-S1, ASPIRATION FOR CYTOLOGY: Acellular debris; no cellular material present for evaluation     #History of COVID x2 last 1/2023 and influenza    Treated with  Paxlovid with persistent fatigue but no active respiratory symptoms  received his influenza annual vaccine as well as COVID boosters locally 11/16/2022     #History of pulmonary infiltrates:   4/20 CT chest with new RUL infiltrate. Highly immunocompromised. 4/22 Fungitell/asp GM were neg. BAL 4/29 NGTD, increased micafungin to 150mg IV daily-- f/u 6/1 Dr Garcia CT with mixed results, followed with Dr. Garcia August 2022 with resolution of pulmonary nodules and normalization of LFTs we will switch patient will switch patient to posaconazole prophylactic dose and monitor LFTs and any infectious concerns closely. Will continue till we determine durable discontinuation of IST given high risk history.      #History of CMV viremia:    - CMV viremia up to 1100. 4/15 started valcyte 900mg PO BID. 4/20 CMV <137, 5/10 ND, decreased to 450mg BID 4/30 - continue 4 weeks as long as CMV <137 till 5/28 + weekly CMV. Switched to acyclovir after completion of therapy 5/28/2022. recheck 3/1/23 ND     - PPx:   ACV, will start Shingrix vaccination series and discontinue in 2 months.  Patient developed oral herpes reactivation after stopping acyclovir therefore resumed on 1/9/2023  Posaconazole (previously on micafungin with LFts abl, hx of pulmonary nodules needign treatment dose). 11/14/2023 discontinue and check CT in 2 months given history completed December 2023 with no concerns for infections or nodular lesions.  Pentamidine, last 1/20/2024. Will schedule next dose 2/9/2024.  11/2023 Discontinue azithro 250mg daily (stopped levaquin due to possible tendonitis).  He is on Augmentin and doxycycline for discitis/osteomyelitis.  IgG1/2023 364, 4/2023 460      - EBV viremia: 4/20 CAP CT (w/ contrast): No adenopathy. S/p 4/22 and 5/4/22 rituximab 375mg/m2 5/10 ND x2, 6/7 ND, 8/18/2022 971, 3/28/22 843  S/p covid vaccine series 12/2021  S/p evusheld     - vaccinations: Previously deferred annual vaccines in the setting of GVHD and  immunosuppression therapy. 11/14/2023 we will start vaccination series, including Shingrix, COVID-vaccine. 1/9/2023 Shingrix vaccine today.  Needs RSV locally.  Will schedule for the rest of his boosters on 2/9 per his request.    12/2023:HSV oral lesions; PCR positive. Stopped ACV last month and lesions appears soon thereafter. Empirically Rx Valtrex. mild lingering URI sx; RVP + rhino (12/21); supportive cares. (CCT 12/1/23 neg).started on pred 40mg/d.  Completed Valtrex course and put back on acyclovir restarted 1/20/2024    6. Endo:   - Hx of graves disease; On synthroid follows with endocrine  - HLD: 11/2022 cholesterol 355, triglycerides 153, -- 11/8 started rosuvastatin 5 mg daily, 11/22 CPK within normal, 5/2/2023 repeat lipid profile cholesterol down to 202, triglycerides 191, LDL now normal at 95.  We will continue same dose of rosuvastatin and add fish oil 1g BID (on hold). Repeated August with PCP     7. CV:   - Hypertension history   - TTE most recently 10/2022     8. GI:   -Omeprazole for heartburn  -LFTs as above, now normal. Off ursodiol     9. Psych:   - Situational anxiety - lexapro 10mg daily.   - Insomnia: worse on steroids. Ativan, trazadone, melatonin     10. MSK:   -History of left food drop, PT. Occasional muscle cramps discussed optimizing vitamin D levels and considering vitamin D, some of the symptomatology of muscle cramps are likely related to chronic GVHD.  No muscle cramps reported today.  -4/2023 new tearing of the right acetabular labrum anterosuperiorly referred to to orthopedic surgery.       11. HCM/Age appropriate cancer screening:  -Discussed with the patient importance of age-appropriate cancer screening including colonoscopies, he is due for this.  -Discussed importance of at least once a year vitamin D level check, 8/18/2022 wnl  -Screening for secondary iron overload, see heme section above  -Yearly TSH 2022 wnl; yearly lipid panel - see GI section above  -9/6/2022  DEXA scan Based on BMD diagnosis is consistent with normal bone density based on WHO criteria Ref. 1   -PFTs 9/20/22, see above. 9/21/2023 PFTs The FVC, FEV1, FEV1/FVC ratio and SPY27-85% are within normal limits. FVC 99% (108), FEV1 91%, DLCO uncorrected 91%.  Repeat PFTs in 4 to 6 months.  -Metabolic other, 8/2022 testosterone within normal  -11/22/2022 discussed with the patient the challenges and the stresses of chronic illness and healthcare fatigue.  Patient had previously been on Lexapro that we restarted confirmed with pharmacy that there are no drug drug interactions.  Additionally we will referred patient to PMNR to help with physical rehab and strength to help with fatigue.  Our social workers will also reach out to Nik and his wife for further resources including support groups for patients with chronic illness and fatigue post transplant.  -Referral to palliative care for symptom and pain management including insomnia     Plan:  -2/27 apt with me, 2/9 pentamidine, vaccines and port flush  -Shingles vaccine today  -Continue off belumosodil  -Continue dexamethasone to 3 times a day from 4 times a day and monitor oral symptoms  -Prednisone 40 mg,  start taper to 40 and 30 mg alternating for 1 week then 30 mg daily for 1 week, then 30 and 20 alternating for 1 week, then 20 for 1 week till he sees me.  - follow with nephrology, endocrinology, PCP, follow-up locally with infectious disease, neurosurgery and pain management for management of osteomyelitis and pain control. PCP follow up for lipid profile, age appropriate cancer screening      I spent 45 minutes in the care of this patient today, which included time necessary for preparation for the visit, obtaining history, ordering medications/tests/procedures as medically indicated, review of pertinent medical literature, counseling of the patient, communication of recommendations to the care team, and documentation time.        Akshat Tobar MD

## 2024-01-09 NOTE — NURSING NOTE
"Oncology Rooming Note    January 9, 2024 2:58 PM   Nik Naav is a 63 year old male who presents for:    Chief Complaint   Patient presents with    Port Draw     Labs drawn via port by RN. VS taken.    Oncology Clinic Visit     ALL     Initial Vitals: /77 (BP Location: Right arm, Patient Position: Sitting, Cuff Size: Adult Large)   Pulse 96   Temp 97.5  F (36.4  C) (Oral)   Resp 16   Wt 114 kg (251 lb 4.8 oz)   SpO2 98%   BMI 33.16 kg/m   Estimated body mass index is 33.16 kg/m  as calculated from the following:    Height as of 10/16/23: 1.854 m (6' 0.99\").    Weight as of this encounter: 114 kg (251 lb 4.8 oz). Body surface area is 2.42 meters squared.  Mild Pain (2) Comment: Data Unavailable   No LMP for male patient.  Allergies reviewed: Yes  Medications reviewed: Yes    Medications: MEDICATION REFILLS NEEDED TODAY. Provider was notified.  Pharmacy name entered into [employer]:    [facility] - [place], [place] - [address] [facility] [place], [place] - [phone]  [facility]    Frailty Screening:   Is the patient here for a new oncology consult visit in cancer care? 2. No      Clinical concerns: Refill rosuvastatin   Avila Tobar  was notified.      Pat Altman                "

## 2024-01-09 NOTE — NURSING NOTE
"Chief Complaint   Patient presents with    Port Draw     Labs drawn via port by RN. VS taken.     Port accessed with 20 gauge, 3/4\" power needle by RN, labs collected, line flushed with saline and heparin, then de-accessed.  Vitals taken.    Pt reminded to check in to next appt.    Jessika Riggs RN    "

## 2024-01-10 RX ORDER — PENICILLIN V POTASSIUM 250 MG/1
250 TABLET, FILM COATED ORAL 2 TIMES DAILY
Qty: 60 TABLET | Refills: 3 | Status: SHIPPED | OUTPATIENT
Start: 2024-01-10 | End: 2024-06-07

## 2024-01-12 RX ORDER — ONDANSETRON 4 MG/1
4 TABLET, ORALLY DISINTEGRATING ORAL EVERY 30 MIN PRN
Status: CANCELLED | OUTPATIENT
Start: 2024-01-12

## 2024-01-12 RX ORDER — FENTANYL CITRATE 50 UG/ML
50 INJECTION, SOLUTION INTRAMUSCULAR; INTRAVENOUS EVERY 5 MIN PRN
Status: CANCELLED | OUTPATIENT
Start: 2024-01-12

## 2024-01-12 RX ORDER — HYDROMORPHONE HYDROCHLORIDE 1 MG/ML
0.2 INJECTION, SOLUTION INTRAMUSCULAR; INTRAVENOUS; SUBCUTANEOUS EVERY 5 MIN PRN
Status: CANCELLED | OUTPATIENT
Start: 2024-01-12

## 2024-01-12 RX ORDER — FENTANYL CITRATE 50 UG/ML
25 INJECTION, SOLUTION INTRAMUSCULAR; INTRAVENOUS EVERY 5 MIN PRN
Status: CANCELLED | OUTPATIENT
Start: 2024-01-12

## 2024-01-12 RX ORDER — OXYCODONE HYDROCHLORIDE 5 MG/1
10 TABLET ORAL
Status: CANCELLED | OUTPATIENT
Start: 2024-01-12

## 2024-01-12 RX ORDER — HYDROMORPHONE HYDROCHLORIDE 1 MG/ML
0.4 INJECTION, SOLUTION INTRAMUSCULAR; INTRAVENOUS; SUBCUTANEOUS EVERY 5 MIN PRN
Status: CANCELLED | OUTPATIENT
Start: 2024-01-12

## 2024-01-12 RX ORDER — OXYCODONE HYDROCHLORIDE 5 MG/1
5 TABLET ORAL
Status: CANCELLED | OUTPATIENT
Start: 2024-01-12

## 2024-01-12 RX ORDER — SODIUM CHLORIDE, SODIUM LACTATE, POTASSIUM CHLORIDE, CALCIUM CHLORIDE 600; 310; 30; 20 MG/100ML; MG/100ML; MG/100ML; MG/100ML
INJECTION, SOLUTION INTRAVENOUS CONTINUOUS
Status: CANCELLED | OUTPATIENT
Start: 2024-01-12

## 2024-01-12 RX ORDER — ONDANSETRON 2 MG/ML
4 INJECTION INTRAMUSCULAR; INTRAVENOUS EVERY 30 MIN PRN
Status: CANCELLED | OUTPATIENT
Start: 2024-01-12

## 2024-01-14 ASSESSMENT — EXTERNAL EXAM - LEFT EYE: OS_EXAM: NORMAL

## 2024-01-15 ENCOUNTER — OFFICE VISIT (OUTPATIENT)
Dept: OPHTHALMOLOGY | Facility: CLINIC | Age: 64
End: 2024-01-15

## 2024-01-15 ENCOUNTER — HOSPITAL ENCOUNTER (OUTPATIENT)
Facility: AMBULATORY SURGERY CENTER | Age: 64
Discharge: HOME | End: 2024-01-15
Attending: OPHTHALMOLOGY
Payer: MEDICARE

## 2024-01-15 VITALS
SYSTOLIC BLOOD PRESSURE: 150 MMHG | BODY MASS INDEX: 31.81 KG/M2 | HEART RATE: 61 BPM | TEMPERATURE: 97.1 F | DIASTOLIC BLOOD PRESSURE: 86 MMHG | OXYGEN SATURATION: 97 % | RESPIRATION RATE: 17 BRPM | HEIGHT: 73 IN | WEIGHT: 240 LBS

## 2024-01-15 DIAGNOSIS — H04.129 DRY EYE: ICD-10-CM

## 2024-01-15 DIAGNOSIS — H25.813 COMBINED FORMS OF AGE-RELATED CATARACT OF BOTH EYES: Primary | ICD-10-CM

## 2024-01-15 DIAGNOSIS — T86.09 GVHD AS COMPLICATION OF BONE MARROW TRANSPLANT (H): ICD-10-CM

## 2024-01-15 DIAGNOSIS — D89.813 GVHD AS COMPLICATION OF BONE MARROW TRANSPLANT (H): ICD-10-CM

## 2024-01-15 DIAGNOSIS — Z98.890 POSTOPERATIVE EYE STATE: Primary | ICD-10-CM

## 2024-01-15 PROCEDURE — 66984 XCAPSL CTRC RMVL W/O ECP: CPT | Mod: LT | Performed by: OPHTHALMOLOGY

## 2024-01-15 PROCEDURE — 99024 POSTOP FOLLOW-UP VISIT: CPT | Mod: GC | Performed by: OPHTHALMOLOGY

## 2024-01-15 PROCEDURE — 66984 XCAPSL CTRC RMVL W/O ECP: CPT | Mod: LT

## 2024-01-15 DEVICE — IMPLANTABLE DEVICE: Type: IMPLANTABLE DEVICE | Site: EYE | Status: FUNCTIONAL

## 2024-01-15 RX ORDER — PROPOFOL 10 MG/ML
INJECTION, EMULSION INTRAVENOUS PRN
Status: DISCONTINUED | OUTPATIENT
Start: 2024-01-15 | End: 2024-01-15

## 2024-01-15 RX ORDER — ACETAMINOPHEN 325 MG/1
975 TABLET ORAL ONCE
Status: COMPLETED | OUTPATIENT
Start: 2024-01-15 | End: 2024-01-15

## 2024-01-15 RX ORDER — PROPARACAINE HYDROCHLORIDE 5 MG/ML
1 SOLUTION/ DROPS OPHTHALMIC ONCE
Status: COMPLETED | OUTPATIENT
Start: 2024-01-15 | End: 2024-01-15

## 2024-01-15 RX ORDER — PROPARACAINE HYDROCHLORIDE 5 MG/ML
1 SOLUTION/ DROPS OPHTHALMIC ONCE
Status: DISCONTINUED | OUTPATIENT
Start: 2024-01-15 | End: 2024-01-16 | Stop reason: HOSPADM

## 2024-01-15 RX ORDER — SODIUM CHLORIDE, SODIUM LACTATE, POTASSIUM CHLORIDE, CALCIUM CHLORIDE 600; 310; 30; 20 MG/100ML; MG/100ML; MG/100ML; MG/100ML
INJECTION, SOLUTION INTRAVENOUS CONTINUOUS
Status: DISCONTINUED | OUTPATIENT
Start: 2024-01-15 | End: 2024-01-16 | Stop reason: HOSPADM

## 2024-01-15 RX ORDER — LIDOCAINE HYDROCHLORIDE 10 MG/ML
INJECTION, SOLUTION EPIDURAL; INFILTRATION; INTRACAUDAL; PERINEURAL PRN
Status: DISCONTINUED | OUTPATIENT
Start: 2024-01-15 | End: 2024-01-15 | Stop reason: HOSPADM

## 2024-01-15 RX ORDER — FENTANYL CITRATE 50 UG/ML
INJECTION, SOLUTION INTRAMUSCULAR; INTRAVENOUS PRN
Status: DISCONTINUED | OUTPATIENT
Start: 2024-01-15 | End: 2024-01-15

## 2024-01-15 RX ORDER — TIMOLOL MALEATE 5 MG/ML
SOLUTION/ DROPS OPHTHALMIC PRN
Status: DISCONTINUED | OUTPATIENT
Start: 2024-01-15 | End: 2024-01-15 | Stop reason: HOSPADM

## 2024-01-15 RX ORDER — MOXIFLOXACIN 5 MG/ML
1 SOLUTION/ DROPS OPHTHALMIC 3 TIMES DAILY
Qty: 3 ML | Refills: 1 | Status: SHIPPED | OUTPATIENT
Start: 2024-01-15 | End: 2024-02-27

## 2024-01-15 RX ORDER — LIDOCAINE 40 MG/G
CREAM TOPICAL
Status: DISCONTINUED | OUTPATIENT
Start: 2024-01-15 | End: 2024-01-16 | Stop reason: HOSPADM

## 2024-01-15 RX ORDER — BALANCED SALT SOLUTION 6.4; .75; .48; .3; 3.9; 1.7 MG/ML; MG/ML; MG/ML; MG/ML; MG/ML; MG/ML
SOLUTION OPHTHALMIC PRN
Status: DISCONTINUED | OUTPATIENT
Start: 2024-01-15 | End: 2024-01-15 | Stop reason: HOSPADM

## 2024-01-15 RX ORDER — ONDANSETRON 2 MG/ML
INJECTION INTRAMUSCULAR; INTRAVENOUS PRN
Status: DISCONTINUED | OUTPATIENT
Start: 2024-01-15 | End: 2024-01-15

## 2024-01-15 RX ORDER — CYCLOPENTOLAT/TROPIC/PHENYLEPH 1%-1%-2.5%
1 DROPS (EA) OPHTHALMIC (EYE)
Status: DISCONTINUED | OUTPATIENT
Start: 2024-01-15 | End: 2024-01-16 | Stop reason: HOSPADM

## 2024-01-15 RX ORDER — LIDOCAINE HYDROCHLORIDE 20 MG/ML
INJECTION, SOLUTION INFILTRATION; PERINEURAL PRN
Status: DISCONTINUED | OUTPATIENT
Start: 2024-01-15 | End: 2024-01-15

## 2024-01-15 RX ORDER — MOXIFLOXACIN IN NACL,ISO-OS/PF 0.3MG/0.3
SYRINGE (ML) INTRAOCULAR PRN
Status: DISCONTINUED | OUTPATIENT
Start: 2024-01-15 | End: 2024-01-15 | Stop reason: HOSPADM

## 2024-01-15 RX ORDER — PREDNISONE 20 MG/1
20 TABLET ORAL DAILY
Qty: 50 TABLET | Refills: 1 | Status: SHIPPED | OUTPATIENT
Start: 2024-01-15 | End: 2024-05-09

## 2024-01-15 RX ORDER — SODIUM CHLORIDE, SODIUM LACTATE, POTASSIUM CHLORIDE, CALCIUM CHLORIDE 600; 310; 30; 20 MG/100ML; MG/100ML; MG/100ML; MG/100ML
INJECTION, SOLUTION INTRAVENOUS CONTINUOUS
Status: CANCELLED | OUTPATIENT
Start: 2024-01-15

## 2024-01-15 RX ORDER — CYCLOPENTOLAT/TROPIC/PHENYLEPH 1%-1%-2.5%
1 DROPS (EA) OPHTHALMIC (EYE)
Status: COMPLETED | OUTPATIENT
Start: 2024-01-15 | End: 2024-01-15

## 2024-01-15 RX ORDER — DEXAMETHASONE SODIUM PHOSPHATE 4 MG/ML
INJECTION, SOLUTION INTRA-ARTICULAR; INTRALESIONAL; INTRAMUSCULAR; INTRAVENOUS; SOFT TISSUE PRN
Status: DISCONTINUED | OUTPATIENT
Start: 2024-01-15 | End: 2024-01-15 | Stop reason: HOSPADM

## 2024-01-15 RX ORDER — PREDNISOLONE ACETATE 10 MG/ML
1 SUSPENSION/ DROPS OPHTHALMIC 4 TIMES DAILY
Qty: 10 ML | Refills: 1 | Status: SHIPPED | OUTPATIENT
Start: 2024-01-15 | End: 2024-04-02

## 2024-01-15 RX ADMIN — PROPOFOL 50 MG: 10 INJECTION, EMULSION INTRAVENOUS at 11:57

## 2024-01-15 RX ADMIN — Medication 1 DROP: at 10:40

## 2024-01-15 RX ADMIN — FENTANYL CITRATE 50 MCG: 50 INJECTION, SOLUTION INTRAMUSCULAR; INTRAVENOUS at 11:57

## 2024-01-15 RX ADMIN — PROPARACAINE HYDROCHLORIDE 1 DROP: 5 SOLUTION/ DROPS OPHTHALMIC at 10:38

## 2024-01-15 RX ADMIN — FENTANYL CITRATE 50 MCG: 50 INJECTION, SOLUTION INTRAMUSCULAR; INTRAVENOUS at 12:04

## 2024-01-15 RX ADMIN — ACETAMINOPHEN 975 MG: 325 TABLET ORAL at 10:40

## 2024-01-15 RX ADMIN — LIDOCAINE HYDROCHLORIDE 60 MG: 20 INJECTION, SOLUTION INFILTRATION; PERINEURAL at 11:57

## 2024-01-15 RX ADMIN — ONDANSETRON 4 MG: 2 INJECTION INTRAMUSCULAR; INTRAVENOUS at 12:03

## 2024-01-15 RX ADMIN — Medication 1 DROP: at 11:06

## 2024-01-15 RX ADMIN — SODIUM CHLORIDE, SODIUM LACTATE, POTASSIUM CHLORIDE, CALCIUM CHLORIDE: 600; 310; 30; 20 INJECTION, SOLUTION INTRAVENOUS at 10:30

## 2024-01-15 RX ADMIN — Medication 1 DROP: at 10:48

## 2024-01-15 ASSESSMENT — VISUAL ACUITY
OS_SC: 20/63
OS_SC+: -1
METHOD: SNELLEN - LINEAR

## 2024-01-15 ASSESSMENT — COPD QUESTIONNAIRES: COPD: 0

## 2024-01-15 ASSESSMENT — TONOMETRY
OS_IOP_MMHG: 15
IOP_METHOD: TONOPEN

## 2024-01-15 ASSESSMENT — ENCOUNTER SYMPTOMS: SEIZURES: 0

## 2024-01-15 ASSESSMENT — LIFESTYLE VARIABLES: TOBACCO_USE: 1

## 2024-01-15 NOTE — PROGRESS NOTES
# POD#0, status post cataract surgery, LEFT eye  - No complaints.  Denies eye pain.    Impression  # Pseudophakia, LEFT eye  - Uncomplicated phacoemulsification CE/IOL.  - POD0, good post-operative appearance, wounds sealed. IOP reasonable.    Plan  - Prednisolone acetate to operative eye: 4x/day for 1 week, 3x/day for 1 week, 2x/day for 1 week, 1x/day for 1 week.   - Moxifloxacin 3x/day to operative eye for 7 days.    - Eye protection at all times and eye shield at night for 1 week.  - Limited activities with no exercise or heavy lifting for 1 week.    - Instructed patient to contact us for decreasing vision, eye pain, new floaters or flashes of light or other concerning symptoms.  - Written instructions given  - Return to clinic for planned post-operative visit.       Kiran Ann MD  Ophthalmology, PGY-4    Attending Physician Attestation:  Complete documentation of historical and exam elements from today's encounter can be found in the full encounter summary report (not reduplicated in this progress note).  I personally obtained the chief complaint(s) and history of present illness.  I confirmed and edited as necessary the review of systems, past medical/surgical history, family history, social history, and examination findings as documented by others; and I examined the patient myself.  I personally reviewed the relevant tests, images, and reports as documented above.  I formulated and edited as necessary the assessment and plan and discussed the findings and management plan with the patient and family. . - Deshawn Menezes MD

## 2024-01-15 NOTE — ANESTHESIA PREPROCEDURE EVALUATION
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)     H/O peripheral stem cell transplant (H)     Hyperthyroidism     Infection due to 2019 novel coronavirus 2023    Influenza A 2022    Postablative hypothyroidism     Pulmonary embolism (H)     Renal cell carcinoma (H)     right kidney    Sleep apnea       Past Surgical History:   Procedure Laterality Date    APPENDECTOMY      BACK SURGERY  2017    BONE MARROW BIOPSY, BONE SPECIMEN, NEEDLE/TROCAR Left 2021    Procedure: BIOPSY, BONE MARROW;  Surgeon: Jailyn Ny APRN CNP;  Location: [facility] OR    BONE MARROW BIOPSY, BONE SPECIMEN, NEEDLE/TROCAR Left 11/15/2021    Procedure: BIOPSY, BONE MARROW;  Surgeon: Socrates De La Torre;  Location: [facility] OR    BONE MARROW BIOPSY, BONE SPECIMEN, NEEDLE/TROCAR Left 2022    Procedure: BIOPSY, BONE MARROW;  Surgeon: Renetta Wiggins PA-C;  Location: [facility] OR    BONE MARROW BIOPSY, BONE SPECIMEN, NEEDLE/TROCAR Left 2022    Procedure: BIOPSY, BONE MARROW;  Surgeon: Lorrie Yap PA-C;  Location: [facility] OR    BONE MARROW BIOPSY, BONE SPECIMEN, NEEDLE/TROCAR Left 2023    Procedure: Bone marrow biopsy, bone specimen, needle/trocar;  Surgeon: Teressa Rick PA-C;  Location: [facility] OR    BRONCHOSCOPY (RIGID OR FLEXIBLE), DIAGNOSTIC N/A 2022    Procedure: BRONCHOSCOPY, WITH BRONCHOALVEOLAR LAVAGE;  Surgeon: Murtaza Garcia MD;  Location: [facility]    IR CVC TUNNEL PLACEMENT > 5 YRS OF AGE  2021    IR CVC TUNNEL REMOVAL LEFT  2021    IR LIVER BIOPSY PERCUTANEOUS  2022    NEPHRECTOMY  2007    ORTHOPEDIC SURGERY      bilateral shoulder  surgeries    PERCUTANEOUS BIOPSY LIVER N/A 2022    Procedure: NEEDLE BIOPSY, LIVER, PERCUTANEOUS;  Surgeon: Trace Castellanos MD;  Location: [facility] OR      Allergies   Allergen Reactions    Sulfamethoxazole-Trimethoprim Rash      Social History     Tobacco Use    Smoking status: Former     Packs/day: 2.00     Years: 30.00     Additional pack years: 0.00     Total pack years: 60.00     Types: Cigarettes     Quit date: 2019     Years since quittin.7     Passive exposure: Past    Smokeless tobacco: Never    Tobacco comments:     DAILY USE OF NICORETTE GUM   Substance Use Topics    Alcohol use: Not Currently      Wt Readings from Last 1 Encounters:   01/15/24 108.9 kg (240 lb)        Anesthesia Evaluation   Pt has had prior anesthetic.     No history of anesthetic complications       ROS/MED HX  ENT/Pulmonary: Comment: Bilateral cataracts     (+) sleep apnea, doesn't use CPAP,   KISHAN risk factors, snores loudly, hypertension,         tobacco use, Past use,                    (-) asthma and COPD   Neurologic:  - neg neurologic ROS  (-) no seizures and no CVA   Cardiovascular:     (+) Dyslipidemia hypertension- -   -  - -                                 Previous cardiac testing   Echo: Date: 22 Results:  Interpretation Summary  Global and regional left ventricular function is normal with an EF of 55-60%.  The right ventricle is normal size.  Global right ventricular function is normal.  Pulmonary artery systolic pressure cannot be assessed.  IVC diameter <2.1 cm collapsing >50% with sniff suggests a normal RA pressure  of 3 mmHg.  No pericardial effusion is present.    Stress Test:  Date: 20 Results:  FINAL CONCLUSIONS     1. Probably normal myocardial perfusion study, despite diaphragmatic/soft   tissue attenuation artifact which may decrease sensitivity for coronary   artery disease.   2. No obvious inducible ischemia or infarction.   3. Normal left ventricular size & calculated ejection fraction;  no obvious   left ventricular regional wall motion abnormalities noted.     No previous study is available for comparison.     ECG Reviewed:  Date: 8/27/23 Results:  SB  Cath:  Date: Results:   (-) CAD and CHF   METS/Exercise Tolerance: >4 METS Comment: Walking to the store, ascending stairs in the home.   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 Now resolved per MRI this fall. Reports minimal back pain.      GI/Hepatic:     (+) GERD, Asymptomatic on medication,               (-) liver disease   Renal/Genitourinary: Comment: - Right RCC s/p right nephrectomy (2007)    (+) renal disease, type: CRI, Pt does not require dialysis,           Endo:     (+)          thyroid problem, hypothyroidism, Chronic steroid usage for Other. Date most recently used: GVHD.        Psychiatric/Substance Use:  - neg psychiatric ROS     Infectious Disease:       Malignancy:   (+) Malignancy, History of Lymphoma/Leukemia and Other.Lymph CA Remission status post Surgery, Chemo and Radiation.  Other CA Right RCC Remission status post Surgery.    Other:            Physical Exam    Airway        Mallampati: II   TM distance: > 3 FB      Respiratory Devices and Support  Comment: Room air       Dental     Comment: Evidence of prior oral lesions w/ gum discoloration but no open sores or bleeding.  unable to assess        Cardiovascular   cardiovascular exam normal          Pulmonary   pulmonary exam normal                OUTSIDE LABS:  CBC:   Lab Results   Component Value Date    WBC 9.6 01/09/2024     "WBC 12.5 (H) 12/21/2023    HGB 11.2 (L) 01/09/2024    HGB 11.9 (L) 12/21/2023    HCT 33.1 (L) 01/09/2024    HCT 34.8 (L) 12/21/2023     01/09/2024     12/21/2023     BMP:   Lab Results   Component Value Date     01/09/2024     12/21/2023    POTASSIUM 4.3 01/09/2024    POTASSIUM 4.6 12/21/2023    CHLORIDE 102 01/09/2024    CHLORIDE 102 12/21/2023    CO2 23 01/09/2024    CO2 24 12/21/2023    BUN 42.9 (H) 01/09/2024    BUN 49.6 (H) 12/21/2023    CR 1.03 01/09/2024    CR 1.13 12/21/2023     (H) 01/09/2024     (H) 12/21/2023     COAGS:   Lab Results   Component Value Date    PTT 22 12/01/2022    INR 0.90 12/01/2022     POC: No results found for: \"BGM\", \"HCG\", \"HCGS\"  HEPATIC:   Lab Results   Component Value Date    ALBUMIN 4.0 01/09/2024    PROTTOTAL 6.5 01/09/2024    ALT 43 01/09/2024    AST 27 01/09/2024    ALKPHOS 108 01/09/2024    BILITOTAL 0.7 01/09/2024     OTHER:   Lab Results   Component Value Date    LACT 0.6 (L) 09/12/2021    DAMON 9.9 01/09/2024    PHOS 3.3 11/27/2023    MAG 2.0 11/14/2023    AMYLASE 85 04/06/2022    TSH 0.89 11/27/2023    T4 1.37 11/27/2023    T3 88 11/27/2023    CRP 4.5 09/12/2021    SED 27 (H) 11/14/2023       Anesthesia Plan    ASA Status:  3       Anesthesia Type: IV Regional Anesthesia.   Induction: Intravenous.           Consents    Anesthesia Plan(s) and associated risks, benefits, and realistic alternatives discussed. Questions answered and patient/representative(s) expressed understanding.     - Discussed: Risks, Benefits and Alternatives for BOTH SEDATION and the PROCEDURE were discussed     - Discussed with:  Patient            Postoperative Care    Pain management: IV analgesics, Multi-modal analgesia, Oral pain medications.        Comments:               Ceasar Hawkins MD    I have reviewed the pertinent notes and labs in the chart from the past 30 days and (re)examined the patient.  Any updates or changes from those notes are reflected in this " "note.      # Hypercalcemia: Highest Ca = 10.2 mg/dL in last 30 days, will monitor as appropriate         # Obesity: Estimated body mass index is 31.66 kg/m  as calculated from the following:    Height as of this encounter: 1.854 m (6' 1\").    Weight as of this encounter: 108.9 kg (240 lb).      "

## 2024-01-15 NOTE — ANESTHESIA POSTPROCEDURE EVALUATION
Patient: Nik Nava    Procedure: Procedure(s):  LEFT EYE PHACOEMULSIFICATION, CATARACT, WITH STANDARD INTRAOCULAR LENS IMPLANT INSERTION       Anesthesia Type:  IV Regional Anesthesia    Note:  Disposition: Outpatient   Postop Pain Control: Uneventful            Sign Out: Well controlled pain   PONV: No   Neuro/Psych: Uneventful            Sign Out: Acceptable/Baseline neuro status   Airway/Respiratory: Uneventful            Sign Out: Acceptable/Baseline resp. status   CV/Hemodynamics: Uneventful            Sign Out: Acceptable CV status; No obvious hypovolemia; No obvious fluid overload   Other NRE: NONE   DID A NON-ROUTINE EVENT OCCUR? No           Last vitals:  Vitals Value Taken Time   /85 01/15/24 1241   Temp 36.2  C (97.1  F) 01/15/24 1241   Pulse 59 01/15/24 1241   Resp     SpO2 97 % 01/15/24 1241       Electronically Signed By: Ceasar Hawkins MD  January 15, 2024  1:16 PM

## 2024-01-15 NOTE — OP NOTE
PREOPERATIVE DIAGNOSIS:   1. Combined forms of age-related cataract of both eyes    2. 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 Cataract extraction with intraocular lens implant Left eye.  SURGEON: Deshawn Menezes M.D.  Assistant: Kiran Ann MD          INDICATIONS: The patient Nik Nava presented to the eye clinic with decreased vision secondary to cataract in the Left eye. The risks, benefits and alternatives to cataract extraction were discussed. The patient elected to proceed. All questions were answered to the patient's satisfaction.   DESCRIPTION OF PROCEDURE:Prior to the procedure, appropriate cardiac and respiratory monitors were applied to the patient.  In the pre-operative holding area, under monitored anesthesia care, a retrobulbar injection of 2% lidocaine with hyaluronidase was given.  The patient was brought to the operating room where a surgical pause was carried out to identify with all members of the surgical team the correct surgical site.  With adequate anesthesia and akinesia, the Left eye was prepped and draped in the usual sterile fashion. A lid speculum was placed, and the operating microscope was rotated into position. A paracentesis was created.  Through this limbal paracentesis, the anterior chamber was inflated with dispersive viscoelastic. A temporal wound was created at the limbus using a 2.5 mm blade. A capsulorrhexis was initiated using a bent 25-gauge needle and was completed in continuous and circular fashion using the capsulorrhexis forceps. The lens nucleus was hydrodissected using balanced salt solution.  The lens nucleus was rotated and removed using phacoemulsification in a stop and chop technique.  Residual cortical material was removed using irrigation-aspiration.  The capsular bag was reinflated to its maximal extent with cohesive viscoelastic.  A 22.5 diopter [id number] was inserted into the capsular bag.   The lens power selected was reviewed using the intraocular lens power measurements that were obtained preoperatively to confirm that the correct lens was selected for the desired post-operative refractive state. The residual viscoelastic was removed in its entirety, the wounds were hydrated.  Two simple 10-0 nylon sutures were used to secure the incisions and knots were buried.  Intracameral moxifloxacin was administered. Tactile pressure was confirmed to be in a normal range. Subconjunctival Dexamethasone (2 mg) was injected. The lid speculum was removed and a patch and shield were applied.  The patient tolerated the procedure well, and there were no complications.   PLAN: The patient will be discharged to home and will follow up tomorrow morning in the eye clinic.  EBL:  None  Complications:  None  Implant Name Type Inv. Item Serial No.  Lot No. LRB No. Used Action   LENS SY60WF 22.5 [employer] BLF ASPHERIC BICONVEX IOL - [id number] Lens/Eye Implant LENS SY60WF 22.5 [employer] BLF ASPHERIC BICONVEX IOL [id number] [facility] [employer]  Left 1 Implanted       Attending Physician Procedure Attestation: I was present for the entire procedure       Deshawn Menezes MD  , Comprehensive Ophthalmology  Department of Ophthalmology and Visual Neurosciences  [facility]

## 2024-01-15 NOTE — ANESTHESIA CARE TRANSFER NOTE
Patient: Nik Nava    Procedure: Procedure(s):  LEFT EYE PHACOEMULSIFICATION, CATARACT, WITH STANDARD INTRAOCULAR LENS IMPLANT INSERTION       Diagnosis: Combined forms of age-related cataract of both eyes [H25.813]  Dry eye [H04.129]  Diagnosis Additional Information: No value filed.    Anesthesia Type:   IV Regional Anesthesia     Note:    Oropharynx: oropharynx clear of all foreign objects and spontaneously breathing  Level of Consciousness: awake      Independent Airway: airway patency satisfactory and stable  Dentition: dentition unchanged  Vital Signs Stable: post-procedure vital signs reviewed and stable    Patient transferred to: Phase II    Handoff Report: Identifed the Patient, Identified the Reponsible Provider, Reviewed the pertinent medical history, Discussed the surgical course, Reviewed Intra-OP anesthesia mangement and issues during anesthesia, Set expectations for post-procedure period and Allowed opportunity for questions and acknowledgement of understanding      Vitals:  Vitals Value Taken Time   BP     Temp     Pulse     Resp     SpO2         Electronically Signed By: PEE Juárez CRNA  January 15, 2024  12:41 PM

## 2024-01-15 NOTE — DISCHARGE INSTRUCTIONS
[facility]  Home Care Following Anesthesia  For 24 hours after surgery:  Get plenty of rest.  A responsible adult must stay with you for at least 24 hours after you leave the surgery center.  Do not drive or use heavy equipment.  If you have weakness or tingling, don't drive or use heavy equipment until this feeling goes away.   Do not drink alcohol.   Avoid strenuous or risky activities.  Ask for help when climbing stairs.  You may feel lightheaded.  IF so, sit for a few minutes before standing.  Have someone help you get up.   If you have nausea (feel sick to your stomach): Drink only clear liquids such as apple juice, ginger ale, broth or 7-Up.  Rest may also help.  Be sure to drink enough fluids.  Move to a regular diet as you feel able.   You may have a slight fever.  Call the doctor if your fever is over 100 F (37.7 C) (taken under the tongue) or lasts longer than 24 hours.  You may have a dry mouth, a sore throat, muscle aches or trouble sleeping. These should go away after 24 hours.  Do not make important or legal decisions.   It is recommended to avoid smoking.               Tips for taking pain medications  To get the best pain relief possible, remember these points:  Take pain medications as directed, before pain becomes severe.  Pain medication can upset your stomach: taking it with food may help.  Constipation is a common side effect of pain medication. Drink plenty of  fluids.  Eat foods high in fiber. Take a stool softener if recommended by your doctor or pharmacist.  Do not drink alcohol, drive or operate machinery while taking pain medications.  Ask about other ways to control pain, such as with heat, ice or relaxation.    Tylenol/Acetaminophen Consumption    If you feel your pain relief is insufficient, you may take Tylenol/Acetaminophen in addition to your narcotic pain medication.   Be careful not to exceed 4,000 mg of Tylenol/Acetaminophen in a 24 hour  period from all sources.  If you are taking extra strength Tylenol/acetaminophen (500 mg), the maximum dose is 8 tablets in 24 hours.  If you are taking regular strength acetaminophen (325 mg), the maximum dose is 12 tablets in 24 hours.  ***You had acetaminophen 975 mg at 10:40am today. You may have another dose of acetaminophen after 4:40 pm today.***    Call a doctor for any of the following:  Signs of infection (fever, growing tenderness at the surgery site, a large amount of drainage or bleeding, severe pain, foul-smelling drainage, redness, swelling).  It has been over 8 to 10 hours since surgery and you are still not able to urinate (pass water).  Headache for over 24 hours.  Numbness, tingling or weakness the day after surgery (if you had spinal anesthesia).  Signs of Covid-19 infection (temperature over 100 degrees, shortness of breath, cough, loss of taste/smell, generalized body aches, persistent headache, chills, sore throat, nausea/vomiting/diarrhea)  Your doctor is:       Dr. Deshawn Menezes, Ophthalmology: [phone]               Or dial [phone] and ask for the resident on call for:  Ophthalmology  For emergency care, call the:  [facility]:  [phone] (TTY for hearing impaired: [phone])

## 2024-01-16 DIAGNOSIS — D89.813 GVHD AS COMPLICATION OF BONE MARROW TRANSPLANT (H): Primary | ICD-10-CM

## 2024-01-16 DIAGNOSIS — T86.09 GVHD AS COMPLICATION OF BONE MARROW TRANSPLANT (H): Primary | ICD-10-CM

## 2024-01-16 RX ORDER — PREDNISONE 10 MG/1
10 TABLET ORAL DAILY
Qty: 30 TABLET | Refills: 1 | Status: SHIPPED | OUTPATIENT
Start: 2024-01-16 | End: 2024-04-02

## 2024-01-17 ENCOUNTER — NURSE TRIAGE (OUTPATIENT)
Dept: NURSING | Facility: CLINIC | Age: 64
End: 2024-01-17
Payer: MEDICARE

## 2024-01-17 NOTE — TELEPHONE ENCOUNTER
Spoke to pt at 1108    S/p cataract surgery 2 days ago in left eye and ok to continue scleral lens use following surgery.    Pt states last night took contact out and had eye pain following--felt better when eye closed.    Pt slept and this morning no eye pain, no redness, no vision changes, no photophobia.    Pt felt did not use preservative free tears as much yesterday and feels lens may have been more dry.    Reviewed possible scratch or epithiel sloughing off with contact lens removal and reviewed can heal over quickly.    Reviewed ok to monitor and continue contact lens use with frequent lubrication-- pt states using PF tears every 45 minutes today.    Reviewed to reach out for any worsening symptoms/vision changes.    Reviewed Dr. Menezes able to review and if would like to amend plan-- clinic will reach out again.    Pt seemed comfortable with information/plan.    Chris Mireles RN 11:19 AM 01/17/24

## 2024-01-17 NOTE — TELEPHONE ENCOUNTER
Nurse Triage SBAR    Is this a 2nd Level Triage? YES, LICENSED PRACTITIONER REVIEW IS REQUIRED    Situation:   Patient received a call from the hospital nurse and was referred to Triage for his concern of:  Patient took out his sclera lens last night and had a sudden onset of sharp pain to his left eye.  He is wondering what that was about>  Did he have them in to long or what.  Concerns?    Background:   LEFT EYE PHACOEMULSIFICATION, CATARACT, WITH STANDARD INTRAOCULAR LENS IMPLANT INSERTION   Deshawn Menezes MD 1-    Assessment:   Patient has no pain today, vision is good today  Yesterday sudden onset of pain with removal of sclera lens.  Pain 4-5/10  constant and sharp.    He took Tylenol and some Oxycodone for pain relief and did get it.  He states that he could not open his eye up and that it felt better closed  Denies fall or injury  Denies fever or chills  Teary eyes  Patient also states that he has a red dot, which was present before surgery, it is still there.  About at the 1000 position when used to reference a clock.   and no changes    Protocol Recommended Disposition:   Routing to eye for a call back        Routed to provider          Reason for Disposition   Brief (now gone) blurred vision and unexplained    Additional Information   Negative: Complete loss of vision in one or both eyes   Negative: SEVERE eye pain   Negative: SEVERE headache   Negative: Double vision   Negative: Blurred vision or visual changes and present now and sudden onset or new (e.g., minutes, hours, days)  (Exception: Seeing floaters / black specks OR previously diagnosed migraine headaches with same symptoms.)   Negative: Patient sounds very sick or weak to the triager   Negative: Flashes of light  (Exception: Brief from pressing on the eyeball.)   Negative: Many floaters in the eye (Exception: Floater(s) are a chronic symptom and this is unchanged from patient's baseline pattern.)   Negative: Eye pain and brief  "(now gone) blurred vision or visual changes   Negative: Taking digoxin (e.g., Lanoxin, Digitek, Cardoxin, Lanoxicaps; Toloxin in [place]) and blurred vision, yellow vision, or yellow-green halos   Negative: Jaw pain while eating and age > 50 years   Negative: Headache and age > 50 years   Negative: Patient wants to be seen    Answer Assessment - Initial Assessment Questions  1. DESCRIPTION: \"How has your vision changed?\" (e.g., complete vision loss, blurred vision, double vision, floaters, etc.)      Last night when taking out sclera lens,  \"it hurt like the dickens\"    TODAY  no pain no issues, just wondering about,  Why did I have that pain, did I wear them to long, not sure why and just wanted to talk to someone about it.  2. LOCATION: \"One or both eyes?\" If one, ask: \"Which eye?\"      Left eye  3. SEVERITY: \"Can you see anything?\" If Yes, ask: \"What can you see?\" (e.g., fine print)      It was hard to se because my eye felt better being closed  4. ONSET: \"When did this begin?\" \"Did it start suddenly or has this been gradual?\"      Last night  5. PATTERN: \"Does this come and go, or has it been constant since it started?\"      It was constant last night, he took some Tylenol and a Oxycodone and eventually went away.  No pain today  6. PAIN: \"Is there any pain in your eye(s)?\"  (Scale 1-10; or mild, moderate, severe)    - NONE (0): No pain.    - MILD (1-3): Doesn't interfere with normal activities.    - MODERATE (4-7): Interferes with normal activities or awakens from sleep.     - SEVERE (8-10): Excruciating pain, unable to do any normal activities.      4-5/10 sharp constant until pain meds kicked in  7. CONTACTS-GLASSES: \"Do you wear contacts or glasses?\"        8. CAUSE: \"What do you think is causing this visual problem?\"      Lens?  9. OTHER SYMPTOMS: \"Do you have any other symptoms?\" (e.g., confusion, headache, arm or leg weakness, speech problems)      no  10. PREGNANCY: \"Is there any chance you are pregnant?\" " "\"When was your last menstrual period?\"        N/a    Protocols used: Vision Loss or Change-A-[place]    "

## 2024-01-18 ENCOUNTER — NURSE TRIAGE (OUTPATIENT)
Dept: NURSING | Facility: CLINIC | Age: 64
End: 2024-01-18
Payer: MEDICARE

## 2024-01-18 NOTE — TELEPHONE ENCOUNTER
"Nurse Triage SBAR    Is this a 2nd Level Triage? YES, LICENSED PRACTITIONER REVIEW IS REQUIRED    Situation:  Red dot that was present in left eye, now has changed since yesterday and is spreading with a tail, down the side of the iris    Background:     LEFT EYE PHACOEMULSIFICATION, CATARACT, WITH STANDARD INTRAOCULAR LENS IMPLANT INSERTION   Deshawn Menezes MD   on  01-      Assessment:   Pain to left eye is 1-2/10  sensation is like there is a lash in there.  Using his drops frequently  Wore his contacts for 6 hours yesterday and it went well.    They are wondering if this spreading is concerning or they need to be seen or expected.    Protocol Recommended Disposition:   Routing to EYE for a call back      Routed to provider        Reason for Disposition   Caller has NON-URGENT question and triager unable to answer question    Additional Information   Negative: Sounds like a life-threatening emergency to the triager   Negative: Bright red, wide-spread, sunburn-like rash   Negative: SEVERE headache and after spinal (epidural) anesthesia   Negative: Vomiting and persists > 4 hours   Negative: Vomiting and abdomen looks much more swollen than usual   Negative: Drinking very little and dehydration suspected (e.g., no urine > 12 hours, very dry mouth, very lightheaded)   Negative: Patient sounds very sick or weak to the triager   Negative: Sounds like a serious complication to the triager   Negative: Fever > 100.4 F (38.0 C)   Negative: Caller has URGENT question and triager unable to answer question   Negative: SEVERE post-op pain (e.g., excruciating, pain scale 8-10) that is not controlled with pain medications   Negative: Headache and after spinal (epidural) anesthesia and not severe   Negative: Fever present > 3 days (72 hours)   Negative: Patient wants to be seen    Answer Assessment - Initial Assessment Questions  1. SYMPTOM: \"What's the main symptom you're concerned about?\" (e.g., pain, fever, " "vomiting)      Patient previously had a red dot in left eye, today it is spreading more.  Unsure if they should be concerned about this.  Tail going down the side of his iris.  2. ONSET: \"When did   start?\"      Change since yesterday  3. SURGERY: \"What surgery did you have?\"      LEFT EYE PHACOEMULSIFICATION, CATARACT, WITH STANDARD INTRAOCULAR LENS IMPLANT INSERTION   Deshawn Menezes MD   on  01-    4. DATE of SURGERY: \"When was the surgery?\"       01-  5. ANESTHESIA: \" What type of anesthesia did you have?\" (e.g., general, spinal, epidural, local)        6. PAIN: \"Is there any pain?\" If Yes, ask: \"How bad is it?\"  (Scale 1-10; or mild, moderate, severe)      Pain 1-2/10  it feels like there is a lash or something in it sensation  7. FEVER: \"Do you have a fever?\" If Yes, ask: \"What is your temperature, how was it measured, and when did it start?\"      no  8. VOMITING: \"Is there any vomiting?\" If Yes, ask: \"How many times?\"      no  9. BLEEDING: \"Is there any bleeding?\" If Yes, ask: \"How much?\" and \"Where?\"      Left eye  10. OTHER SYMPTOMS: \"Do you have any other symptoms?\" (e.g., drainage from wound, painful urination, constipation)        Patient is using his drops a lot   He was able ot wear his contacts for 6 hours yesterday and it went well.    Protocols used: Post-Op Symptoms and [id number]    "

## 2024-01-18 NOTE — TELEPHONE ENCOUNTER
Pt noted small red spot following surgery when I spoke to pt earlier this week.    Spoke to pt today at 1045    Pt noting red spot spreading out now and noticed more by wife than patient    No new pain  No new vision changes.    Reviewed smart phrase below:    Your eye redness is a small amount of blood sitting in your conjunctiva called a subconjunctival hemorrhage. These are not dangerous and will not harm your eye. They may get worse before the get better. They will resolve on their own with time.    The best thing you can do for your eye at this time is to use artificial tears. You can buy these over the counter without a prescription at any pharmacy. Use them four times a day until the blood resolves.    If you develop vision changes, increased pain in the eye, or new light sensitivity, please contact us or be seen right away    --    Pt verbally demonstrated understanding and seemed comfortable with information/plan.    Chris Mireles RN 10:52 AM 01/18/24

## 2024-01-23 ENCOUNTER — OFFICE VISIT (OUTPATIENT)
Dept: OPTOMETRY | Facility: CLINIC | Age: 64
End: 2024-01-23
Payer: MEDICARE

## 2024-01-23 ENCOUNTER — OFFICE VISIT (OUTPATIENT)
Dept: OPHTHALMOLOGY | Facility: CLINIC | Age: 64
End: 2024-01-23
Attending: OPHTHALMOLOGY
Payer: MEDICARE

## 2024-01-23 DIAGNOSIS — T86.09 GVHD AS COMPLICATION OF BONE MARROW TRANSPLANT (H): ICD-10-CM

## 2024-01-23 DIAGNOSIS — Z98.890 POSTOPERATIVE EYE STATE: Primary | ICD-10-CM

## 2024-01-23 DIAGNOSIS — Z94.81 STATUS POST BONE MARROW TRANSPLANT (H): Primary | ICD-10-CM

## 2024-01-23 DIAGNOSIS — R00.2 PALPITATIONS: ICD-10-CM

## 2024-01-23 DIAGNOSIS — D89.813 GVHD AS COMPLICATION OF BONE MARROW TRANSPLANT (H): ICD-10-CM

## 2024-01-23 DIAGNOSIS — Z96.1 PSEUDOPHAKIA: ICD-10-CM

## 2024-01-23 DIAGNOSIS — H04.129 DRY EYE: Primary | ICD-10-CM

## 2024-01-23 DIAGNOSIS — H16.123 FILAMENTARY KERATITIS OF BOTH EYES: ICD-10-CM

## 2024-01-23 DIAGNOSIS — H25.11 AGE-RELATED NUCLEAR CATARACT, RIGHT EYE: ICD-10-CM

## 2024-01-23 PROCEDURE — 99024 POSTOP FOLLOW-UP VISIT: CPT | Performed by: OPHTHALMOLOGY

## 2024-01-23 PROCEDURE — G0463 HOSPITAL OUTPT CLINIC VISIT: HCPCS | Performed by: OPHTHALMOLOGY

## 2024-01-23 PROCEDURE — 999N000103 HC STATISTIC NO CHARGE FACILITY FEE

## 2024-01-23 ASSESSMENT — REFRACTION_CURRENTRX
OD_BASECURVE: 7.9
OS_DIAMETER: 14.9
OD_SPHERE: -0.75
OD_BRAND: ONEFIT
OS_ADDL_SPECS: OPT EXTRA BLUE, HYDRAPEG
OS_BRAND: ONEFIT
OD_ADDL_SPECS: OPT EXTRA CLEAR, HYDRAPEG
OS_SPHERE: -0.50
OS_BASECURVE: 7.9
OD_DIAMETER: 14.9

## 2024-01-23 ASSESSMENT — SLIT LAMP EXAM - LIDS
COMMENTS: 1+ BLEPH, THICKENED MARGINS, 1-2+ MGD
COMMENTS: NORMAL
COMMENTS: NORMAL
COMMENTS: 1+ BLEPH, THICKENED MARGINS, 1-2+ MGD

## 2024-01-23 ASSESSMENT — EXTERNAL EXAM - LEFT EYE
OS_EXAM: DERMATOCHALASIS
OS_EXAM: DERMATOCHALASIS

## 2024-01-23 ASSESSMENT — CONF VISUAL FIELD
OS_INFERIOR_TEMPORAL_RESTRICTION: 0
OD_NORMAL: 1
OD_INFERIOR_NASAL_RESTRICTION: 0
OS_INFERIOR_NASAL_RESTRICTION: 0
OS_NORMAL: 1
METHOD: COUNTING FINGERS
OS_SUPERIOR_TEMPORAL_RESTRICTION: 0
OD_SUPERIOR_NASAL_RESTRICTION: 0
OD_SUPERIOR_TEMPORAL_RESTRICTION: 0
OS_NORMAL: 1
OD_NORMAL: 1
OS_SUPERIOR_NASAL_RESTRICTION: 0
OD_SUPERIOR_TEMPORAL_RESTRICTION: 0
OD_INFERIOR_TEMPORAL_RESTRICTION: 0
OS_SUPERIOR_NASAL_RESTRICTION: 0
OD_INFERIOR_NASAL_RESTRICTION: 0
OS_INFERIOR_TEMPORAL_RESTRICTION: 0
OD_INFERIOR_TEMPORAL_RESTRICTION: 0
OS_INFERIOR_NASAL_RESTRICTION: 0
OD_SUPERIOR_NASAL_RESTRICTION: 0
OS_SUPERIOR_TEMPORAL_RESTRICTION: 0

## 2024-01-23 ASSESSMENT — TONOMETRY
OD_IOP_MMHG: 16
OD_IOP_MMHG: 16
IOP_METHOD: TONOPEN
OS_IOP_MMHG: 15
OS_IOP_MMHG: 15
IOP_METHOD: ICARE

## 2024-01-23 ASSESSMENT — VISUAL ACUITY
OS_SC: 20/20
METHOD: SNELLEN - LINEAR
OS_SC+: -3
OS_SC+: -3
OS_SC: 20/20
VA_OR_OS_CURRENT_RX: 20/20
OD_PH_SC: 20/70
OD_PH_SC: 20/70
METHOD: SNELLEN - LINEAR
OD_SC: 20/125
OD_SC: 20/125

## 2024-01-23 ASSESSMENT — EXTERNAL EXAM - RIGHT EYE
OD_EXAM: DERMATOCHALASIS
OD_EXAM: DERMATOCHALASIS

## 2024-01-23 NOTE — PROGRESS NOTES
A/P  1.) Dry Eye OU  -s/p BMT 08/2021. Dryness right eye>left eye, symptomatic for photophobia/tearing  -Failed: Restasis/Xiidra (stinging), plugs (scarred puncta), BCL (retention)  -Chronic oral steroid use  -Now excellent response to scleral lens - corneal staining resolved OU. Much improved ocular comfort.   -Wife helping with I&R. Pt on oral steroids causing hands to shake, unable to do himself.  -Right lens generally working well, has not worn in a few days. Would rec restarting prior to surgery given new filaments today.  -Left lens with some suction issues since cataract surgery - overall similar fit to previous - likely mild chemosis affecting lens removal. Will decrease sag and flatten edge to help - may need to tighten back up once eye is healed    2.) Cataracts OU  -Progressed by chronic steroid use  -Doing well s/p CE/IOL left eye. Excellent acuity. Needs updated power in scleral lens. Sutures intact   -Getting right eye surgery in 2 weeks, already has follow-up scheduled with me after    Order left lens and rush ship.     I have confirmed the patient's CC, HPI and reviewed Past Medical History, Past Surgical History, Social History, Family History, Problem List, Medication List and agree with Tech note.     Aisha Juarez, ADILENE ROBINS

## 2024-01-23 NOTE — Clinical Note
Thanks for seeing Nik today.  Are you available to see him sooner after his right eye surgery?  Maybe the week of the 12th?

## 2024-01-23 NOTE — PROGRESS NOTES
Chief Complaint(s) and History of Present Illness(es)     Cataract Follow-Up            Laterality: left eye    Comments: Cataract surgery done in left eye. Blurry vision with scleral   lenses left eye. Not wearing it on at the moment, has been having a hard   time taking it off. Has been using lubricating drops every 45 minutes.   Started acetylcysteine qid.  Last edited by Aisha Juarez, OD on 1/23/2024  9:34 AM.   (History)            Review of systems for the eyes was negative other than the pertinent positives/negatives listed in the HPI.      Assessment & Plan      Nik Nava is a 63 year old male with the following diagnoses:   1. Postoperative eye state    2. 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  I personally obtained the chief complaint(s) and history of present illness.  I confirmed and edited as necessary the review of systems, past medical/surgical history, family history, social history, and examination findings as documented by others; and I examined the patient myself.  I personally reviewed the relevant tests, images, and reports as documented above.  I formulated and edited as necessary the assessment and plan and discussed the findings and management plan with the patient and family. . - Deshawn Menezes MD

## 2024-01-24 ENCOUNTER — APPOINTMENT (OUTPATIENT)
Dept: LAB | Facility: CLINIC | Age: 64
End: 2024-01-24
Attending: INTERNAL MEDICINE
Payer: MEDICARE

## 2024-01-24 ENCOUNTER — ONCOLOGY VISIT (OUTPATIENT)
Dept: TRANSPLANT | Facility: CLINIC | Age: 64
End: 2024-01-24
Attending: INTERNAL MEDICINE
Payer: MEDICARE

## 2024-01-24 ENCOUNTER — ANCILLARY PROCEDURE (OUTPATIENT)
Dept: GENERAL RADIOLOGY | Facility: CLINIC | Age: 64
End: 2024-01-24
Attending: INTERNAL MEDICINE
Payer: MEDICARE

## 2024-01-24 VITALS
SYSTOLIC BLOOD PRESSURE: 128 MMHG | RESPIRATION RATE: 16 BRPM | TEMPERATURE: 97.9 F | OXYGEN SATURATION: 98 % | HEART RATE: 96 BPM | WEIGHT: 254.3 LBS | DIASTOLIC BLOOD PRESSURE: 70 MMHG | BODY MASS INDEX: 33.55 KG/M2

## 2024-01-24 DIAGNOSIS — R00.2 PALPITATIONS: ICD-10-CM

## 2024-01-24 DIAGNOSIS — I10 HYPERTENSION, ESSENTIAL: ICD-10-CM

## 2024-01-24 DIAGNOSIS — Z94.81 STATUS POST BONE MARROW TRANSPLANT (H): ICD-10-CM

## 2024-01-24 LAB
ALBUMIN SERPL BCG-MCNC: 4 G/DL (ref 3.5–5.2)
ALP SERPL-CCNC: 90 U/L (ref 40–150)
ALT SERPL W P-5'-P-CCNC: 38 U/L (ref 0–70)
ANION GAP SERPL CALCULATED.3IONS-SCNC: 10 MMOL/L (ref 7–15)
AST SERPL W P-5'-P-CCNC: 30 U/L (ref 0–45)
BASOPHILS # BLD AUTO: 0 10E3/UL (ref 0–0.2)
BASOPHILS NFR BLD AUTO: 0 %
BILIRUB SERPL-MCNC: 0.8 MG/DL
BUN SERPL-MCNC: 45.4 MG/DL (ref 8–23)
CALCIUM SERPL-MCNC: 9.7 MG/DL (ref 8.8–10.2)
CHLORIDE SERPL-SCNC: 100 MMOL/L (ref 98–107)
CREAT SERPL-MCNC: 1.15 MG/DL (ref 0.67–1.17)
DEPRECATED HCO3 PLAS-SCNC: 26 MMOL/L (ref 22–29)
EGFRCR SERPLBLD CKD-EPI 2021: 71 ML/MIN/1.73M2
EOSINOPHIL # BLD AUTO: 0 10E3/UL (ref 0–0.7)
EOSINOPHIL NFR BLD AUTO: 0 %
ERYTHROCYTE [DISTWIDTH] IN BLOOD BY AUTOMATED COUNT: 16.9 % (ref 10–15)
GLUCOSE SERPL-MCNC: 116 MG/DL (ref 70–99)
HCT VFR BLD AUTO: 33.5 % (ref 40–53)
HGB BLD-MCNC: 11.6 G/DL (ref 13.3–17.7)
IMM GRANULOCYTES # BLD: 0.1 10E3/UL
IMM GRANULOCYTES NFR BLD: 1 %
LYMPHOCYTES # BLD AUTO: 1.9 10E3/UL (ref 0.8–5.3)
LYMPHOCYTES NFR BLD AUTO: 21 %
MCH RBC QN AUTO: 35.6 PG (ref 26.5–33)
MCHC RBC AUTO-ENTMCNC: 34.6 G/DL (ref 31.5–36.5)
MCV RBC AUTO: 103 FL (ref 78–100)
MONOCYTES # BLD AUTO: 0.6 10E3/UL (ref 0–1.3)
MONOCYTES NFR BLD AUTO: 6 %
NEUTROPHILS # BLD AUTO: 6.4 10E3/UL (ref 1.6–8.3)
NEUTROPHILS NFR BLD AUTO: 72 %
NRBC # BLD AUTO: 0.1 10E3/UL
NRBC BLD AUTO-RTO: 1 /100
PLATELET # BLD AUTO: 158 10E3/UL (ref 150–450)
POTASSIUM SERPL-SCNC: 4.6 MMOL/L (ref 3.4–5.3)
PROT SERPL-MCNC: 6.3 G/DL (ref 6.4–8.3)
RBC # BLD AUTO: 3.26 10E6/UL (ref 4.4–5.9)
SODIUM SERPL-SCNC: 136 MMOL/L (ref 135–145)
WBC # BLD AUTO: 9 10E3/UL (ref 4–11)

## 2024-01-24 PROCEDURE — 36591 DRAW BLOOD OFF VENOUS DEVICE: CPT

## 2024-01-24 PROCEDURE — 71046 X-RAY EXAM CHEST 2 VIEWS: CPT | Mod: GC | Performed by: RADIOLOGY

## 2024-01-24 PROCEDURE — 99215 OFFICE O/P EST HI 40 MIN: CPT | Mod: 24

## 2024-01-24 PROCEDURE — 93005 ELECTROCARDIOGRAM TRACING: CPT

## 2024-01-24 PROCEDURE — 85025 COMPLETE CBC W/AUTO DIFF WBC: CPT

## 2024-01-24 PROCEDURE — 250N000011 HC RX IP 250 OP 636: Performed by: INTERNAL MEDICINE

## 2024-01-24 PROCEDURE — 80053 COMPREHEN METABOLIC PANEL: CPT

## 2024-01-24 PROCEDURE — G0463 HOSPITAL OUTPT CLINIC VISIT: HCPCS

## 2024-01-24 RX ORDER — HEPARIN SODIUM (PORCINE) LOCK FLUSH IV SOLN 100 UNIT/ML 100 UNIT/ML
500 SOLUTION INTRAVENOUS ONCE
Status: COMPLETED | OUTPATIENT
Start: 2024-01-24 | End: 2024-01-24

## 2024-01-24 RX ORDER — PENTAMIDINE ISETHIONATE 300 MG/300MG
300 INHALANT RESPIRATORY (INHALATION)
Status: CANCELLED | OUTPATIENT
Start: 2024-01-24 | End: 2024-01-24

## 2024-01-24 RX ORDER — AMLODIPINE BESYLATE 5 MG/1
5 TABLET ORAL DAILY
COMMUNITY
Start: 2024-01-24 | End: 2024-02-23

## 2024-01-24 RX ORDER — CHLORTHALIDONE 25 MG/1
12.5 TABLET ORAL DAILY
Qty: 30 TABLET | Refills: 0 | Status: SHIPPED | OUTPATIENT
Start: 2024-01-24 | End: 2024-03-12

## 2024-01-24 RX ORDER — ALBUTEROL SULFATE 0.83 MG/ML
2.5 SOLUTION RESPIRATORY (INHALATION)
Status: CANCELLED | OUTPATIENT
Start: 2024-01-24 | End: 2024-01-24

## 2024-01-24 RX ADMIN — Medication 500 UNITS: at 12:56

## 2024-01-24 ASSESSMENT — PAIN SCALES - GENERAL: PAINLEVEL: NO PAIN (0)

## 2024-01-24 NOTE — PROGRESS NOTES
Spoke with Nik and his wife regarding recent symptoms. Reported elevated BPs, UREÑA, and racing heart. Discussed with Dr. Avila Tobar and ordered EKG, ECHO, CXR, labs, and to be seen by provider in clinic. Patient and wife agreeable to the plan.

## 2024-01-24 NOTE — LETTER
1/24/2024         RE: Nik Nava  [id number] [facility] [id number]        Dear Colleague,    Thank you for referring your patient, Nik Nava, to the [facility] BLOOD AND MARROW TRANSPLANT PROGRAM [place]. Please see a copy of my visit note below.      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    HPI:  Nik was added today due to worsening symptoms of dyspnea with exertion, weight gain/water retention and higher blood pressure. Over the last 4 weeks he's been more SOB with stairs and walking short distances. Not SOB with sitting.  He's currently on 30/20 pred taper. Feels that the weight gain is mostly water in lower extremities, chin and abdomen. Nephrology recently increased amlodipine to 5mg BID (5mg daily). No HAs. No fevers. No CP. No cough. Wearing maurizio hose.             Review of Systems                                                                                                                                         10 point Review of systems was negative except as detailed above     PHYSICAL EXAM                                                                                                                                                   KPS: 70    /70   Pulse 96   Temp 97.9  F (36.6  C) (Oral)   Resp 16   Wt 115.3 kg (254 lb 4.8 oz)   SpO2 98%   BMI 33.55 kg/m      Wt Readings from Last 4 Encounters:   01/24/24 115.3 kg (254 lb 4.8 oz)   01/15/24 108.9 kg (240 lb)   01/09/24 114 kg (251 lb 4.8 oz)   11/14/23 110.2 kg (243 lb)      General: NAD. Cushingoid   HEENT: sclera anicteric, injected conjunctivae.    Lungs:  CTA b/l  no wheezes  CV: RRR  no gallop or murmer's   Abd; soft, NABS, protuberant  Ext/ Skin: Skin bruising on bilateral forearms,  fainting of previous hyperpigmented areas of previously known cGVHD  LE 1+ bilateral edema in lower  extremities    cGVHD therapy started on February 24 2022  - Baseline NIH scoring 2/24/2022 : skin 2 maculopapular rash/erythema with no sclerotic features, mouth score 1 mild symptoms with lichenoid changes less than 25%, eyes score 2 moderate symptoms without new visual impairments due to KCS requiring lubricant eyedrops more than 3 times a day, overall mild scale for her provider  - 3/25/2022 1 month NIH treatment assessment on PQRST: skin 2, mouth score 1 mild symptoms with NO lichenoid changes, eyes score 2 moderate symptoms w requiring lubricant eyedrops more than 3 times a day, liver score 1 ALT 3.7x ULN and nl bilirubin   - 3/31  Mouth clear; eyes minimal LFT still abn; no joint or new GI sx  - 4/28 Mouth clear, Left>R eye is mild sclera erythema, lids are pink and irritated appearing, LFT's slow improvement, no new joint, skin, or GI symptoms.   -5/11 mouth with mild erythema but no lichenoid changes, eyes using eyedrops 4-6 times a day score of 2, LFTs significantly improved from baseline slight elevation in alk phos and AST with normal bilirubin, skin with hyperpigmentation from old acute GVHD no active skin rashes, no GI symptoms no musculoskeletal complaints.  -5/26 Mouth with very slight lichenoid changes, erythema.  Eyes still using eyedrops 4-6x per day.  LFTs essentially normal with slight elevation in alk phos.  Skin with hyperpigmentation from old acute GVHD, no active rashes, no GI or MSK concerns.  Skin 0, mouth 0 but with lichenoid changes, eyes 2  -6/7/22 Mouth with no lichenoid changes, erythema.  Eyes still using eyedrops 4-6x per day.  LFTs essentially normal with slight elevation in alk phos.  Skin with hyperpigmentation from old acute GVHD, no active rashes, no GI or MSK concerns.  Skin 0, mouth 0, eyes 2     -6 month PQRST assessment 9/6/2022: eyes 3, mouth 0 for symptoms, 25% lichenoid changes (1), liver/GI/skin 0    11/8/2022, 11/22/2022, 12/13/22 cGVHD NIG scoring eyes 3, no other  organ involvement clinically  3/28/23 cGVHD NIG scoring eyes 3, no other organ involvement clinically  5/2/23 cGVHD NIG scoring eyes 3, oral 1, no other organ involvement clinically  6/13/23 cGVHD NIG scoring eyes 3, oral 1, no other organ involvement clinically  8/15/23 cGVHD NIG scoring eyes 3 (down to 3-4 times eye drop from >10 but still using scleral lenses), oral 1, no other organ involvement clinically  10/10/2023 cGVHD NIG scoring eyes 2-3 (down to 3-4 times eye drop from >10 but still using scleral lenses), oral 1, no other organ involvement clinically  11/14/2023 cGVHD NIG scoring eyes 2-3 (down to 3-4 times eye drop from >10 but still using scleral lenses), oral 1, no other organ involvement clinically  1/9/2023 cGVHD NIG scoring eyes 2-3 (down to 3-4 times eye drop from >10 but still using scleral lenses), oral 2, no other organ involvement clinically    Lab:    Lab Results   Component Value Date    WBC 9.0 01/24/2024    ANEU 3.5 10/26/2021    HGB 11.6 (L) 01/24/2024    HCT 33.5 (L) 01/24/2024     01/24/2024     01/24/2024    POTASSIUM 4.6 01/24/2024    CHLORIDE 100 01/24/2024    CO2 26 01/24/2024     (H) 01/24/2024    BUN 45.4 (H) 01/24/2024    CR 1.15 01/24/2024    MAG 2.0 11/14/2023    INR 0.90 12/01/2022    BILITOTAL 0.8 01/24/2024    AST 30 01/24/2024    ALT 38 01/24/2024    ALKPHOS 90 01/24/2024    PROTTOTAL 6.3 (L) 01/24/2024    ALBUMIN 4.0 01/24/2024 4/28/2023    MRI Lumbar spine  1.  The previously seen ventral epidural abscess has resolved with small amount of residual ventral epidural phlegmon.   2.  Marrow edema about the L5-S1 endplates has mildly worsened within new rim-enhancing subchondral lesion in the left S1 superior endplate. This could reflect progression of osteomyelitis.      MRI hip right  1.  No evidence of septic arthritis of the right hip joint.   2.  There is degeneration and likely tearing of the right acetabular labrum anterosuperiorly, and probably  low-grade chondromalacia of the right hip joint.   3.  Abnormality at L5-S1 compatible with known discitis-osteomyelitis.    ASSESSMENT AND PLAN   Nik Nava is a 63 year old male with Ph Pos ALL, day 897 s/p sib allo stem cell transplant         1.  Acute lymphoblastic leukemia, [place] chromosome positive in CR, MRD neg. S/p allo sib PBSCT. (ABO matched)  HCT-CI score: 3 (prior solid tumor)  Day +100 bone marrow biopsy is 100% donor with no morphologic or flow cytometric evidence of leukemia BCR abl is pending.  - Day +180 restaging BM bx- still in CR  100% donor;  2/16/22 BCR ABL major breakpoint undetectable.   - 1 year restaging 8/18/2022: Markedly hypocellular bone marrow [less than 10% cellular] with maturing trilineage hematopoiesis and no definite morphologic or immunophenotypic evidence for involvement by B lymphoblastic leukemia. BCRABL1 PCR not detected, bone marrow % donor. FISH NORMAL No evidence of BCR-ABL1 fusion  - Maintenance with TKI posttransplantation given his Ph positive ALL that have not been started previously presumed secondary to multiple active issues specifically infections and COVID.  Per Avila Tobar: will reconsider this patient will think about whether this is something he would like to consider he was previously on Dasatinib, we would start on a low dose and increase as tolerated.  This is on hold now pending active GVHD therapy, we discussed on this visit 10/18 in agreement with holding off.  - 2 year restaging 8/7/2023 BMB: - No morphologic or immunophenotypic evidence of recurrent/persistent B-lymphoblastic leukemia. Slightly hypocellular marrow (10-20% estimated cellularity, patchy and variable), with trilineage hematopoetiic maturation and less than 2% blasts. Peripheral blood showing slight normocytic normochromic anemia and slight thrombocytopenia. BM % donor. FISH No evidence of BCR::ABL1 fusion /CG No recipient (male) cells  or cells with a t(9;22) or  other clonal chromosomal abnormality were  detected among the 20 metaphases analyzed.      2.  HEME: CBC stable.   3/2023 iron low 28, iron saturation index 10%, transferrin 225, ferritin 1381 down trending (in retrospect that was the time of epidural abscess as well).  B12, folate normal.  High copper and zinc borderline low, 5/2023 started zinc.  Started iron replacement in 4/2023, recheck iron profile on follow up more consistent with AOCD, discontinued iron. Iron 11/14/23 WNL. Note ferritin elevation is in the setting of discitis/OM, will reassess.    3.  FEN/Renal: Creatinine 0.97, s/p nephrectormy, nephrology following  HTN: Recently amlodipine was increased to 5mg BID which may be contributing to LE edema but also BPs are elevated. Add chlorthalidone 12.5mg each morning and decrease amlodipine to 5mg at night.      4.    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   1/9/2/2024 cGVHD NIG scoring eyes 2-3, oral 1, no other organ involvement clinically   #Skin GVHD- history of biopsy proven GVHD of the skin 8/30/2021; resolved.   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 WNL today   # Ocular GVHD: follows with ophthalmology. Maxitrol to lids, continue refreshing drops QID. Score 3, but down to 3-4 times eye drops from >10/day. Not needing Sl all the time.  Scheduled for cataract surgeries.  Followed closely by Dr. 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  Lichenoid changes have largely resolved with no open ulcers since 11/8/2022, December 2020 for flare of oral GVHD in the setting of oral herpes reactivation    Previous therapies  Tacrolimus-previously decided to stay on same dose, no checks of levels if clinically stable, and no need switch to sirolimus. (keep tac low as gvhd is quiet and viremia). 5/10 level 45 with starting of COVID therapy and D-D intractions (Paxlovid for  COVID19, which has a drug interaction with tacrolimus-Ritonavir increases serum levels of tacrolimus).   Tacrolimus level subtherapeutic, increase to 2.5 mg twice daily recently, 8/23/2022 instructed to change tacrolimus to 2 mg twice daily. 9/6 with mild NEGRA, hyperkalemia, hypomagnesemia, and reports some tremors and cramps, suspect tacrolimus to be high therefore empirically decreased to 1.5 twice daily, skip morning dose of tacrolimus and took 2 mg today after his afternoon labs. Decrease to 1mg BID on Saturday.  Sirolimus  9/21 despite fluids and a dose adjustment of tacrolimus patient seem to have persistence NORBERTO related NEGRA, therefore after discussion with the patient decision was made to transition to sirolimus that was started on 9/21, patient initially seem to tolerate this well with normalization of kidney function  10/11 presented with flares of ocular and oral GVHD, fluid retention and gain of 5-6 kg, lower extremity edema, abdominal distention, total protein to creatinine ratio elevated at 0.4, in addition to elevation in BUN and creatinine, HTN.  Picture most consistent with sirolimus related side effects.  Less likely that the patient will tolerate even a lower level of sirolimus.  Therefore the patient was instructed to stop sirolimus, last dose on 10/10. 10/14 level 3.5 appropriately trending down.     Corticosteroids  3/1-3/16: prednisone 100mg every day  3/17 75mg every day   3/31 75 alt with 50  4/6: 75 alt with 25mg every other day  4/12 pred tapered to 60 alternating with 25mg   4/15 In setting of viruses trying to reactivate,GVHD stable: Taper 60/15mg alternating.  4/20 decrease pred further to 50/10.    4/25 taper pred to 50/0 every other day as long as symptoms stable.   5/2 Pred decrease 40/0 alternating every other day.   5/13 decrease to 30/0  5/20 decrease to 20/0 then slowly by 5 mg weekly  6/7 decrease to 10/0  Went up to 15/0 with mild increased LFTs  8/23/2022 increased to 20/0, with  persistence of oral ulcers and active ocular GVHD.  Discussed with the patient the importance of stopping chewing of nicotine gums for prolonged hours as that would not help with the healing of the oral ulcers.  I discussed with the patient alternatives of nicotine patches that he will reconsider and let me know.  9/6 decreased to 15 alternating with 0  9/13 decreased to 10 alternating with 0  9/21 discontinued tacrolimus given persistent NEGRA and single kidney and start the patient on sirolimus without loading dose.  We will get a list of possible side effects and adverse events from the Pharm.D. see sirolimus section above.  10/11 GVHD flare. Sirolimus stopped. Resume prednisone 40 mg daily as of 10/12, no change with mildly worsening eye pain 10/14  Reduce pred to 35mg 10/25 and 25mg 11/1 11/8 20 mg   11/22 15 mg  12/13/22 10 mg (plan to taper to 5mg then slow taper 1 mg per month given long pred history)  2/23/23 lower from 5 mg to 4 mg for the next month  3/28/2023 - 5/2/2023- 8/15/2023 continue on 10/4 given adrenal insufficieny with recent am cortisol check and clinical symptoms on taper to lower dose of 4 mg daily  -12/2023 had oral GVHD flare started on Prednisone 40 mg,  start taper to 40 and 30 mg alternating for 1 week then 30 mg daily for 1 week, then 30 and 20 alternating for 1 week, then 20 for 1 week till he sees me.    Belumosudil  Patient intolerant/with disease flares on tacrolimus and sirolimus see above.  Discussed with the patient the different options for therapy of chronic GVHD that are FDA approved.  Given the side effect profiles after discussing in detail and given the least nephrotoxicity and expected response, taking into account other metabolic effect as well.  We will proceed with prior authorization with belumosudil.  Patient was given material to review for possible expected adverse events and side effects with both Belumosodil and ruxolitinib.   --- Started on 10/18/2022 -  skin/liver/oral GVHD inactive, and occular symptoms controlled/symptomatic improvement.   --- 10/10/2023 will hold belumosodil given recurrent infections ans significant improvement in symptoms with no end organ damage after discussions with him and his wife. Will optimize topical treatment with scleral lenses, eyedrops ans dex s/sp int he interim to avoid flares. Will readdress need to restart systemic treatment pending infectious resolution and symptoms.         5.  ID:   # Recent history of Epidural abscess: Followed by Dr. Candelario ID, plan to be on IV ceftriaxone for at least 6 weeks course through 5/11/2023-to be determined on further follow-up.  See imaging with MRI follow-up as above.   #Recurrent discitis/OM still on treatment through October 2023, cx negative, managed with ID locally.  3/30/2023 blood culture with Staph epidermidis  3/31/2023 BONE, L5, FLUOROSCOPICALLY-GUIDED NEEDLE BIOPSY: Benign bone with trilineage hematopoiesis (normal) Negative for neoplasm Negative for acute osteomyelitis. DISC, L5-S1, ASPIRATION FOR CYTOLOGY: Acellular debris; no cellular material present for evaluation     #History of COVID x2 last 1/2023 and influenza    Treated with Paxlovid with persistent fatigue but no active respiratory symptoms  received his influenza annual vaccine as well as COVID boosters locally 11/16/2022     #History of pulmonary infiltrates:   4/20 CT chest with new RUL infiltrate. Highly immunocompromised. 4/22 Fungitell/asp GM were neg. BAL 4/29 NGTD, increased micafungin to 150mg IV daily-- f/u 6/1 Dr Garcia CT with mixed results, followed with Dr. Garcia August 2022 with resolution of pulmonary nodules and normalization of LFTs we will switch patient will switch patient to posaconazole prophylactic dose and monitor LFTs and any infectious concerns closely. Will continue till we determine durable discontinuation of IST given high risk history.      #History of CMV viremia:    - CMV viremia up to 1100.  4/15 started valcyte 900mg PO BID. 4/20 CMV <137, 5/10 ND, decreased to 450mg BID 4/30 - continue 4 weeks as long as CMV <137 till 5/28 + weekly CMV. Switched to acyclovir after completion of therapy 5/28/2022. recheck 3/1/23 ND     - PPx:   ACV, will start Shingrix vaccination series and discontinue in 2 months.  Patient developed oral herpes reactivation after stopping acyclovir therefore resumed on 1/9/2023  Posaconazole (previously on micafungin with LFts abl, hx of pulmonary nodules needign treatment dose). 11/14/2023 discontinue and check CT in 2 months given history completed December 2023 with no concerns for infections or nodular lesions.  Pentamidine, last 1/20/2024. Will schedule next dose 2/9/2024.  11/2023 Discontinue azithro 250mg daily (stopped levaquin due to possible tendonitis).  He is on Augmentin and doxycycline for discitis/osteomyelitis.  IgG1/2023 364, 4/2023 460      - EBV viremia: 4/20 CAP CT (w/ contrast): No adenopathy. S/p 4/22 and 5/4/22 rituximab 375mg/m2 5/10 ND x2, 6/7 ND, 8/18/2022 971, 3/28/22 843  S/p covid vaccine series 12/2021  S/p evusheld     - vaccinations: Previously deferred annual vaccines in the setting of GVHD and immunosuppression therapy. 11/14/2023 we will start vaccination series, including Shingrix, COVID-vaccine. 1/9/2023 Shingrix vaccine today.  Needs RSV locally.  Will schedule for the rest of his boosters on 2/9 per his request.    12/2023:HSV oral lesions; PCR positive. Stopped ACV last month and lesions appears soon thereafter. Empirically Rx Valtrex. mild lingering URI sx; RVP + rhino (12/21); supportive cares. (CCT 12/1/23 neg).started on pred 40mg/d.  Completed Valtrex course and put back on acyclovir restarted 1/20/2024    6. Endo:   - Hx of graves disease; On synthroid follows with endocrine. Check TSH today  - HLD: 11/2022 cholesterol 355, triglycerides 153, -- 11/8 started rosuvastatin 5 mg daily, 11/22 CPK within normal, 5/2/2023 repeat lipid  profile cholesterol down to 202, triglycerides 191, LDL now normal at 95.  We will continue same dose of rosuvastatin and add fish oil 1g BID (on hold). Repeated August with PCP     7. CV:   - Hypertension history   - TTE most recently 10/2022     8. GI:   -Omeprazole for heartburn  -LFTs as above, now normal. Off ursodiol     9. Psych:   - Situational anxiety - lexapro 10mg daily.   - Insomnia: worse on steroids. Ativan, trazadone, melatonin     10. MSK:   -History of left food drop, PT. Occasional muscle cramps discussed optimizing vitamin D levels and considering vitamin D, some of the symptomatology of muscle cramps are likely related to chronic GVHD.  No muscle cramps reported today.  -4/2023 new tearing of the right acetabular labrum anterosuperiorly referred to to orthopedic surgery.       11. HCM/Age appropriate cancer screening:  -Discussed with the patient importance of age-appropriate cancer screening including colonoscopies, he is due for this.  -Discussed importance of at least once a year vitamin D level check, 8/18/2022 wnl  -Screening for secondary iron overload, see heme section above  -Yearly TSH 2022 wnl; yearly lipid panel - see GI section above  -9/6/2022 DEXA scan Based on BMD diagnosis is consistent with normal bone density based on WHO criteria Ref. 1   -PFTs 9/20/22, see above. 9/21/2023 PFTs The FVC, FEV1, FEV1/FVC ratio and PUA83-45% are within normal limits. FVC 99% (108), FEV1 91%, DLCO uncorrected 91%.  Repeat PFTs in 4 to 6 months.  -Metabolic other, 8/2022 testosterone within normal  -11/22/2022 discussed with the patient the challenges and the stresses of chronic illness and healthcare fatigue.  Patient had previously been on Lexapro that we restarted confirmed with pharmacy that there are no drug drug interactions.  Additionally we will referred patient to PMNR to help with physical rehab and strength to help with fatigue.  Our social workers will also reach out to Nik and his wife  for further resources including support groups for patients with chronic illness and fatigue post transplant.  -Referral to palliative care for symptom and pain management including insomnia     Plan:  -decrease amlodipine 5mg at night  -start chlorthalidone 12.5mg each morning  -EKG okay  -CXR okay  -CBC/CMP okay  -echo pending  -TSH pending  -2/27 appt with Dr. Rivera, 2/9 pentamidine, vaccines and port flush     - follow with nephrology, endocrinology, PCP, follow-up locally with infectious disease, neurosurgery and pain management for management of osteomyelitis and pain control. PCP follow up for lipid profile, age appropriate cancer screening      I spent 50 minutes in the care of this patient today, which included time necessary for preparation for the visit, obtaining history, ordering medications/tests/procedures as medically indicated, review of pertinent medical literature, counseling of the patient, communication of recommendations to the care team, and documentation time.    Patito Mims NP

## 2024-01-24 NOTE — NURSING NOTE
"EKG was performed today per order written by Dr Avila Tobar.  Name and  verified with patient. Patient tolerated well without incident. File transmitted to chart.    Deborah Michaels LPN   on 2024 at 1:46 PM    Oncology Rooming Note    2024 1:47 PM   Nik Nava is a 63 year old male who presents for:    Chief Complaint   Patient presents with    Port Draw     Labs drawn via port by RN in lab.  VS taken    Oncology Clinic Visit     Acute Lymphoblastic Leukemia     Initial Vitals: /70   Pulse 96   Temp 97.9  F (36.6  C) (Oral)   Resp 16   Wt 115.3 kg (254 lb 4.8 oz)   SpO2 98%   BMI 33.55 kg/m   Estimated body mass index is 33.55 kg/m  as calculated from the following:    Height as of 1/15/24: 1.854 m (6' 1\").    Weight as of this encounter: 115.3 kg (254 lb 4.8 oz). Body surface area is 2.44 meters squared.  No Pain (0) Comment: Data Unavailable   No LMP for male patient.  Allergies reviewed: Yes  Medications reviewed: Yes    Medications: Medication refills not needed today.  Pharmacy name entered into [employer]:    formerly [facility] - [place], [place] - [address] [facility] - [place], [place] - [address] [place] [address] [phone]  [facility]  [facility] - [place], [place] - [address]    Frailty Screening:   Is the patient here for a new oncology consult visit in cancer care? 2. No      Clinical concerns: None       Deborah Michaels LPN  2024              "

## 2024-01-24 NOTE — PROGRESS NOTES
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    HPI:  Nik was added today due to worsening symptoms of dyspnea with exertion, weight gain/water retention and higher blood pressure. Over the last 4 weeks he's been more SOB with stairs and walking short distances. Not SOB with sitting.  He's currently on 30/20 pred taper. Feels that the weight gain is mostly water in lower extremities, chin and abdomen. Nephrology recently increased amlodipine to 5mg BID (5mg daily). No HAs. No fevers. No CP. No cough. Wearing maurizio hose.             Review of Systems                                                                                                                                         10 point Review of systems was negative except as detailed above     PHYSICAL EXAM                                                                                                                                                   KPS: 70    /70   Pulse 96   Temp 97.9  F (36.6  C) (Oral)   Resp 16   Wt 115.3 kg (254 lb 4.8 oz)   SpO2 98%   BMI 33.55 kg/m      Wt Readings from Last 4 Encounters:   01/24/24 115.3 kg (254 lb 4.8 oz)   01/15/24 108.9 kg (240 lb)   01/09/24 114 kg (251 lb 4.8 oz)   11/14/23 110.2 kg (243 lb)      General: NAD. Cushingoid   HEENT: sclera anicteric, injected conjunctivae.    Lungs:  CTA b/l  no wheezes  CV: RRR  no gallop or murmer's   Abd; soft, NABS, protuberant  Ext/ Skin: Skin bruising on bilateral forearms,  fainting of previous hyperpigmented areas of previously known cGVHD  LE 1+ bilateral edema in lower extremities    cGVHD therapy started on February 24 2022  - Baseline NIH scoring 2/24/2022 : skin 2 maculopapular rash/erythema with no sclerotic features, mouth score 1 mild symptoms with lichenoid changes less than 25%, eyes score 2 moderate symptoms without new visual impairments due to  KCS requiring lubricant eyedrops more than 3 times a day, overall mild scale for her provider  - 3/25/2022 1 month NIH treatment assessment on PQRST: skin 2, mouth score 1 mild symptoms with NO lichenoid changes, eyes score 2 moderate symptoms w requiring lubricant eyedrops more than 3 times a day, liver score 1 ALT 3.7x ULN and nl bilirubin   - 3/31  Mouth clear; eyes minimal LFT still abn; no joint or new GI sx  - 4/28 Mouth clear, Left>R eye is mild sclera erythema, lids are pink and irritated appearing, LFT's slow improvement, no new joint, skin, or GI symptoms.   -5/11 mouth with mild erythema but no lichenoid changes, eyes using eyedrops 4-6 times a day score of 2, LFTs significantly improved from baseline slight elevation in alk phos and AST with normal bilirubin, skin with hyperpigmentation from old acute GVHD no active skin rashes, no GI symptoms no musculoskeletal complaints.  -5/26 Mouth with very slight lichenoid changes, erythema.  Eyes still using eyedrops 4-6x per day.  LFTs essentially normal with slight elevation in alk phos.  Skin with hyperpigmentation from old acute GVHD, no active rashes, no GI or MSK concerns.  Skin 0, mouth 0 but with lichenoid changes, eyes 2  -6/7/22 Mouth with no lichenoid changes, erythema.  Eyes still using eyedrops 4-6x per day.  LFTs essentially normal with slight elevation in alk phos.  Skin with hyperpigmentation from old acute GVHD, no active rashes, no GI or MSK concerns.  Skin 0, mouth 0, eyes 2     -6 month PQRST assessment 9/6/2022: eyes 3, mouth 0 for symptoms, 25% lichenoid changes (1), liver/GI/skin 0    11/8/2022, 11/22/2022, 12/13/22 cGVHD NIG scoring eyes 3, no other organ involvement clinically  3/28/23 cGVHD NIG scoring eyes 3, no other organ involvement clinically  5/2/23 cGVHD NIG scoring eyes 3, oral 1, no other organ involvement clinically  6/13/23 cGVHD NIG scoring eyes 3, oral 1, no other organ involvement clinically  8/15/23 cGVHD NIG scoring eyes  3 (down to 3-4 times eye drop from >10 but still using scleral lenses), oral 1, no other organ involvement clinically  10/10/2023 cGVHD NIG scoring eyes 2-3 (down to 3-4 times eye drop from >10 but still using scleral lenses), oral 1, no other organ involvement clinically  11/14/2023 cGVHD NIG scoring eyes 2-3 (down to 3-4 times eye drop from >10 but still using scleral lenses), oral 1, no other organ involvement clinically  1/9/2023 cGVHD NIG scoring eyes 2-3 (down to 3-4 times eye drop from >10 but still using scleral lenses), oral 2, no other organ involvement clinically    Lab:    Lab Results   Component Value Date    WBC 9.0 01/24/2024    ANEU 3.5 10/26/2021    HGB 11.6 (L) 01/24/2024    HCT 33.5 (L) 01/24/2024     01/24/2024     01/24/2024    POTASSIUM 4.6 01/24/2024    CHLORIDE 100 01/24/2024    CO2 26 01/24/2024     (H) 01/24/2024    BUN 45.4 (H) 01/24/2024    CR 1.15 01/24/2024    MAG 2.0 11/14/2023    INR 0.90 12/01/2022    BILITOTAL 0.8 01/24/2024    AST 30 01/24/2024    ALT 38 01/24/2024    ALKPHOS 90 01/24/2024    PROTTOTAL 6.3 (L) 01/24/2024    ALBUMIN 4.0 01/24/2024 4/28/2023    MRI Lumbar spine  1.  The previously seen ventral epidural abscess has resolved with small amount of residual ventral epidural phlegmon.   2.  Marrow edema about the L5-S1 endplates has mildly worsened within new rim-enhancing subchondral lesion in the left S1 superior endplate. This could reflect progression of osteomyelitis.      MRI hip right  1.  No evidence of septic arthritis of the right hip joint.   2.  There is degeneration and likely tearing of the right acetabular labrum anterosuperiorly, and probably low-grade chondromalacia of the right hip joint.   3.  Abnormality at L5-S1 compatible with known discitis-osteomyelitis.    ASSESSMENT AND PLAN   Nik Nava is a 63 year old male with Ph Pos ALL, day 897 s/p sib allo stem cell transplant         1.  Acute lymphoblastic leukemia,  [place] chromosome positive in CR, MRD neg. S/p allo sib PBSCT. (ABO matched)  HCT-CI score: 3 (prior solid tumor)  Day +100 bone marrow biopsy is 100% donor with no morphologic or flow cytometric evidence of leukemia BCR abl is pending.  - Day +180 restaging BM bx- still in CR  100% donor;  2/16/22 BCR ABL major breakpoint undetectable.   - 1 year restaging 8/18/2022: Markedly hypocellular bone marrow [less than 10% cellular] with maturing trilineage hematopoiesis and no definite morphologic or immunophenotypic evidence for involvement by B lymphoblastic leukemia. BCRABL1 PCR not detected, bone marrow % donor. FISH NORMAL No evidence of BCR-ABL1 fusion  - Maintenance with TKI posttransplantation given his Ph positive ALL that have not been started previously presumed secondary to multiple active issues specifically infections and COVID.  Per Avila Tobar: will reconsider this patient will think about whether this is something he would like to consider he was previously on Dasatinib, we would start on a low dose and increase as tolerated.  This is on hold now pending active GVHD therapy, we discussed on this visit 10/18 in agreement with holding off.  - 2 year restaging 8/7/2023 BMB: - No morphologic or immunophenotypic evidence of recurrent/persistent B-lymphoblastic leukemia. Slightly hypocellular marrow (10-20% estimated cellularity, patchy and variable), with trilineage hematopoetiic maturation and less than 2% blasts. Peripheral blood showing slight normocytic normochromic anemia and slight thrombocytopenia. BM % donor. FISH No evidence of BCR::ABL1 fusion /CG No recipient (male) cells  or cells with a t(9;22) or other clonal chromosomal abnormality were  detected among the 20 metaphases analyzed.      2.  HEME: CBC stable.   3/2023 iron low 28, iron saturation index 10%, transferrin 225, ferritin 1381 down trending (in retrospect that was the time of epidural abscess as well).  B12, folate  normal.  High copper and zinc borderline low, 5/2023 started zinc.  Started iron replacement in 4/2023, recheck iron profile on follow up more consistent with AOCD, discontinued iron. Iron 11/14/23 WNL. Note ferritin elevation is in the setting of discitis/OM, will reassess.    3.  FEN/Renal: Creatinine 0.97, s/p nephrectormy, nephrology following  HTN: Recently amlodipine was increased to 5mg BID which may be contributing to LE edema but also BPs are elevated. Add chlorthalidone 12.5mg each morning and decrease amlodipine to 5mg at night.      4.    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   1/9/2/2024 cGVHD NIG scoring eyes 2-3, oral 1, no other organ involvement clinically   #Skin GVHD- history of biopsy proven GVHD of the skin 8/30/2021; resolved.   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 WNL today   # Ocular GVHD: follows with ophthalmology. Maxitrol to lids, continue refreshing drops QID. Score 3, but down to 3-4 times eye drops from >10/day. Not needing Sl all the time.  Scheduled for cataract surgeries.  Followed closely by Dr. 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  Lichenoid changes have largely resolved with no open ulcers since 11/8/2022, December 2020 for flare of oral GVHD in the setting of oral herpes reactivation    Previous therapies  Tacrolimus-previously decided to stay on same dose, no checks of levels if clinically stable, and no need switch to sirolimus. (keep tac low as gvhd is quiet and viremia). 5/10 level 45 with starting of COVID therapy and D-D intractions (Paxlovid for COVID19, which has a drug interaction with tacrolimus-Ritonavir increases serum levels of tacrolimus).   Tacrolimus level subtherapeutic, increase to 2.5 mg twice daily recently, 8/23/2022 instructed to change tacrolimus to 2 mg twice daily. 9/6 with mild NEGRA, hyperkalemia,  hypomagnesemia, and reports some tremors and cramps, suspect tacrolimus to be high therefore empirically decreased to 1.5 twice daily, skip morning dose of tacrolimus and took 2 mg today after his afternoon labs. Decrease to 1mg BID on Saturday.  Sirolimus  9/21 despite fluids and a dose adjustment of tacrolimus patient seem to have persistence NORBERTO related NEGRA, therefore after discussion with the patient decision was made to transition to sirolimus that was started on 9/21, patient initially seem to tolerate this well with normalization of kidney function  10/11 presented with flares of ocular and oral GVHD, fluid retention and gain of 5-6 kg, lower extremity edema, abdominal distention, total protein to creatinine ratio elevated at 0.4, in addition to elevation in BUN and creatinine, HTN.  Picture most consistent with sirolimus related side effects.  Less likely that the patient will tolerate even a lower level of sirolimus.  Therefore the patient was instructed to stop sirolimus, last dose on 10/10. 10/14 level 3.5 appropriately trending down.     Corticosteroids  3/1-3/16: prednisone 100mg every day  3/17 75mg every day   3/31 75 alt with 50  4/6: 75 alt with 25mg every other day  4/12 pred tapered to 60 alternating with 25mg   4/15 In setting of viruses trying to reactivate,GVHD stable: Taper 60/15mg alternating.  4/20 decrease pred further to 50/10.    4/25 taper pred to 50/0 every other day as long as symptoms stable.   5/2 Pred decrease 40/0 alternating every other day.   5/13 decrease to 30/0  5/20 decrease to 20/0 then slowly by 5 mg weekly  6/7 decrease to 10/0  Went up to 15/0 with mild increased LFTs  8/23/2022 increased to 20/0, with persistence of oral ulcers and active ocular GVHD.  Discussed with the patient the importance of stopping chewing of nicotine gums for prolonged hours as that would not help with the healing of the oral ulcers.  I discussed with the patient alternatives of nicotine patches  that he will reconsider and let me know.  9/6 decreased to 15 alternating with 0  9/13 decreased to 10 alternating with 0  9/21 discontinued tacrolimus given persistent NEGRA and single kidney and start the patient on sirolimus without loading dose.  We will get a list of possible side effects and adverse events from the Pharm.D. see sirolimus section above.  10/11 GVHD flare. Sirolimus stopped. Resume prednisone 40 mg daily as of 10/12, no change with mildly worsening eye pain 10/14  Reduce pred to 35mg 10/25 and 25mg 11/1 11/8 20 mg   11/22 15 mg  12/13/22 10 mg (plan to taper to 5mg then slow taper 1 mg per month given long pred history)  2/23/23 lower from 5 mg to 4 mg for the next month  3/28/2023 - 5/2/2023- 8/15/2023 continue on 10/4 given adrenal insufficieny with recent am cortisol check and clinical symptoms on taper to lower dose of 4 mg daily  -12/2023 had oral GVHD flare started on Prednisone 40 mg,  start taper to 40 and 30 mg alternating for 1 week then 30 mg daily for 1 week, then 30 and 20 alternating for 1 week, then 20 for 1 week till he sees me.    Belumosudil  Patient intolerant/with disease flares on tacrolimus and sirolimus see above.  Discussed with the patient the different options for therapy of chronic GVHD that are FDA approved.  Given the side effect profiles after discussing in detail and given the least nephrotoxicity and expected response, taking into account other metabolic effect as well.  We will proceed with prior authorization with belumosudil.  Patient was given material to review for possible expected adverse events and side effects with both Belumosodil and ruxolitinib.   --- Started on 10/18/2022 - skin/liver/oral GVHD inactive, and occular symptoms controlled/symptomatic improvement.   --- 10/10/2023 will hold belumosodil given recurrent infections ans significant improvement in symptoms with no end organ damage after discussions with him and his wife. Will optimize topical  treatment with scleral lenses, eyedrops ans dex s/sp int he interim to avoid flares. Will readdress need to restart systemic treatment pending infectious resolution and symptoms.         5.  ID:   # Recent history of Epidural abscess: Followed by Dr. Candelario ID, plan to be on IV ceftriaxone for at least 6 weeks course through 5/11/2023-to be determined on further follow-up.  See imaging with MRI follow-up as above.   #Recurrent discitis/OM still on treatment through October 2023, cx negative, managed with ID locally.  3/30/2023 blood culture with Staph epidermidis  3/31/2023 BONE, L5, FLUOROSCOPICALLY-GUIDED NEEDLE BIOPSY: Benign bone with trilineage hematopoiesis (normal) Negative for neoplasm Negative for acute osteomyelitis. DISC, L5-S1, ASPIRATION FOR CYTOLOGY: Acellular debris; no cellular material present for evaluation     #History of COVID x2 last 1/2023 and influenza    Treated with Paxlovid with persistent fatigue but no active respiratory symptoms  received his influenza annual vaccine as well as COVID boosters locally 11/16/2022     #History of pulmonary infiltrates:   4/20 CT chest with new RUL infiltrate. Highly immunocompromised. 4/22 Fungitell/asp GM were neg. BAL 4/29 NGTD, increased micafungin to 150mg IV daily-- f/u 6/1 Dr Garcia CT with mixed results, followed with Dr. Garcia August 2022 with resolution of pulmonary nodules and normalization of LFTs we will switch patient will switch patient to posaconazole prophylactic dose and monitor LFTs and any infectious concerns closely. Will continue till we determine durable discontinuation of IST given high risk history.      #History of CMV viremia:    - CMV viremia up to 1100. 4/15 started valcyte 900mg PO BID. 4/20 CMV <137, 5/10 ND, decreased to 450mg BID 4/30 - continue 4 weeks as long as CMV <137 till 5/28 + weekly CMV. Switched to acyclovir after completion of therapy 5/28/2022. recheck 3/1/23 ND     - PPx:   ACV, will start Shingrix vaccination  series and discontinue in 2 months.  Patient developed oral herpes reactivation after stopping acyclovir therefore resumed on 1/9/2023  Posaconazole (previously on micafungin with LFts abl, hx of pulmonary nodules needign treatment dose). 11/14/2023 discontinue and check CT in 2 months given history completed December 2023 with no concerns for infections or nodular lesions.  Pentamidine, last 1/20/2024. Will schedule next dose 2/9/2024.  11/2023 Discontinue azithro 250mg daily (stopped levaquin due to possible tendonitis).  He is on Augmentin and doxycycline for discitis/osteomyelitis.  IgG1/2023 364, 4/2023 460      - EBV viremia: 4/20 CAP CT (w/ contrast): No adenopathy. S/p 4/22 and 5/4/22 rituximab 375mg/m2 5/10 ND x2, 6/7 ND, 8/18/2022 971, 3/28/22 843  S/p covid vaccine series 12/2021  S/p evusheld     - vaccinations: Previously deferred annual vaccines in the setting of GVHD and immunosuppression therapy. 11/14/2023 we will start vaccination series, including Shingrix, COVID-vaccine. 1/9/2023 Shingrix vaccine today.  Needs RSV locally.  Will schedule for the rest of his boosters on 2/9 per his request.    12/2023:HSV oral lesions; PCR positive. Stopped ACV last month and lesions appears soon thereafter. Empirically Rx Valtrex. mild lingering URI sx; RVP + rhino (12/21); supportive cares. (CCT 12/1/23 neg).started on pred 40mg/d.  Completed Valtrex course and put back on acyclovir restarted 1/20/2024    6. Endo:   - Hx of graves disease; On synthroid follows with endocrine. Check TSH today  - HLD: 11/2022 cholesterol 355, triglycerides 153, -- 11/8 started rosuvastatin 5 mg daily, 11/22 CPK within normal, 5/2/2023 repeat lipid profile cholesterol down to 202, triglycerides 191, LDL now normal at 95.  We will continue same dose of rosuvastatin and add fish oil 1g BID (on hold). Repeated August with PCP     7. CV:   - Hypertension history   - TTE most recently 10/2022     8. GI:   -Omeprazole for  heartburn  -LFTs as above, now normal. Off ursodiol     9. Psych:   - Situational anxiety - lexapro 10mg daily.   - Insomnia: worse on steroids. Ativan, trazadone, melatonin     10. MSK:   -History of left food drop, PT. Occasional muscle cramps discussed optimizing vitamin D levels and considering vitamin D, some of the symptomatology of muscle cramps are likely related to chronic GVHD.  No muscle cramps reported today.  -4/2023 new tearing of the right acetabular labrum anterosuperiorly referred to to orthopedic surgery.       11. HCM/Age appropriate cancer screening:  -Discussed with the patient importance of age-appropriate cancer screening including colonoscopies, he is due for this.  -Discussed importance of at least once a year vitamin D level check, 8/18/2022 wnl  -Screening for secondary iron overload, see heme section above  -Yearly TSH 2022 wnl; yearly lipid panel - see GI section above  -9/6/2022 DEXA scan Based on BMD diagnosis is consistent with normal bone density based on WHO criteria Ref. 1   -PFTs 9/20/22, see above. 9/21/2023 PFTs The FVC, FEV1, FEV1/FVC ratio and LUQ34-73% are within normal limits. FVC 99% (108), FEV1 91%, DLCO uncorrected 91%.  Repeat PFTs in 4 to 6 months.  -Metabolic other, 8/2022 testosterone within normal  -11/22/2022 discussed with the patient the challenges and the stresses of chronic illness and healthcare fatigue.  Patient had previously been on Lexapro that we restarted confirmed with pharmacy that there are no drug drug interactions.  Additionally we will referred patient to PMNR to help with physical rehab and strength to help with fatigue.  Our social workers will also reach out to Nik and his wife for further resources including support groups for patients with chronic illness and fatigue post transplant.  -Referral to palliative care for symptom and pain management including insomnia     Plan:  -decrease amlodipine 5mg at night  -start chlorthalidone 12.5mg each  morning  -EKG okay  -CXR okay  -CBC/CMP okay  -echo pending  -TSH pending  -2/27 appt with Dr. Rivera, 2/9 pentamidine, vaccines and port flush     - follow with nephrology, endocrinology, PCP, follow-up locally with infectious disease, neurosurgery and pain management for management of osteomyelitis and pain control. PCP follow up for lipid profile, age appropriate cancer screening      I spent 50 minutes in the care of this patient today, which included time necessary for preparation for the visit, obtaining history, ordering medications/tests/procedures as medically indicated, review of pertinent medical literature, counseling of the patient, communication of recommendations to the care team, and documentation time.    Patito Mims NP

## 2024-01-26 ENCOUNTER — ANCILLARY PROCEDURE (OUTPATIENT)
Dept: CARDIOLOGY | Facility: CLINIC | Age: 64
End: 2024-01-26
Attending: INTERNAL MEDICINE
Payer: MEDICARE

## 2024-01-26 DIAGNOSIS — Z94.81 STATUS POST BONE MARROW TRANSPLANT (H): ICD-10-CM

## 2024-01-26 DIAGNOSIS — R00.2 PALPITATIONS: ICD-10-CM

## 2024-02-01 NOTE — PROGRESS NOTES
Called patient's wife Lamar to check in after Nik's discharge from the hospital. Reports Nik is feeling well after discharge and has had no fevers or tachycardia. Discussed prednisone taper and Dr. Avila Tobar's recommendation to continue tapering down if Nik is feeling well. Continues on prednisone 20mg and will reduce to alternating 20/10 if he continues to feel well. Will update RNCC next week.

## 2024-02-02 ENCOUNTER — ANESTHESIA EVENT (OUTPATIENT)
Dept: SURGERY | Facility: AMBULATORY SURGERY CENTER | Age: 64
End: 2024-02-02
Payer: MEDICARE

## 2024-02-02 DIAGNOSIS — Z94.81 STATUS POST BONE MARROW TRANSPLANT (H): Primary | ICD-10-CM

## 2024-02-05 ENCOUNTER — HOSPITAL ENCOUNTER (OUTPATIENT)
Facility: AMBULATORY SURGERY CENTER | Age: 64
Discharge: HOME | End: 2024-02-05
Attending: OPHTHALMOLOGY
Payer: MEDICARE

## 2024-02-05 ENCOUNTER — OFFICE VISIT (OUTPATIENT)
Dept: OPHTHALMOLOGY | Facility: CLINIC | Age: 64
End: 2024-02-05

## 2024-02-05 ENCOUNTER — ANESTHESIA (OUTPATIENT)
Dept: SURGERY | Facility: AMBULATORY SURGERY CENTER | Age: 64
End: 2024-02-05
Payer: MEDICARE

## 2024-02-05 VITALS
WEIGHT: 250 LBS | BODY MASS INDEX: 33.13 KG/M2 | HEART RATE: 99 BPM | RESPIRATION RATE: 16 BRPM | OXYGEN SATURATION: 96 % | DIASTOLIC BLOOD PRESSURE: 79 MMHG | TEMPERATURE: 97 F | SYSTOLIC BLOOD PRESSURE: 110 MMHG | HEIGHT: 73 IN

## 2024-02-05 DIAGNOSIS — Z96.1 PSEUDOPHAKIA, BOTH EYES: Primary | ICD-10-CM

## 2024-02-05 DIAGNOSIS — H25.813 COMBINED FORMS OF AGE-RELATED CATARACT OF BOTH EYES: Primary | ICD-10-CM

## 2024-02-05 PROCEDURE — 66984 XCAPSL CTRC RMVL W/O ECP: CPT | Mod: RT

## 2024-02-05 PROCEDURE — 99024 POSTOP FOLLOW-UP VISIT: CPT | Mod: GC | Performed by: OPHTHALMOLOGY

## 2024-02-05 PROCEDURE — 66984 XCAPSL CTRC RMVL W/O ECP: CPT | Mod: 79 | Performed by: OPHTHALMOLOGY

## 2024-02-05 DEVICE — IMPLANTABLE DEVICE: Type: IMPLANTABLE DEVICE | Site: EYE | Status: FUNCTIONAL

## 2024-02-05 RX ORDER — PREDNISOLONE ACETATE 10 MG/ML
1 SUSPENSION/ DROPS OPHTHALMIC 4 TIMES DAILY
Qty: 10 ML | Refills: 1 | Status: SHIPPED | OUTPATIENT
Start: 2024-02-05 | End: 2024-04-02

## 2024-02-05 RX ORDER — BALANCED SALT SOLUTION 6.4; .75; .48; .3; 3.9; 1.7 MG/ML; MG/ML; MG/ML; MG/ML; MG/ML; MG/ML
SOLUTION OPHTHALMIC PRN
Status: DISCONTINUED | OUTPATIENT
Start: 2024-02-05 | End: 2024-02-05 | Stop reason: HOSPADM

## 2024-02-05 RX ORDER — ONDANSETRON 2 MG/ML
4 INJECTION INTRAMUSCULAR; INTRAVENOUS EVERY 30 MIN PRN
Status: DISCONTINUED | OUTPATIENT
Start: 2024-02-05 | End: 2024-02-06 | Stop reason: HOSPADM

## 2024-02-05 RX ORDER — OXYCODONE HYDROCHLORIDE 5 MG/1
5 TABLET ORAL
Status: DISCONTINUED | OUTPATIENT
Start: 2024-02-05 | End: 2024-02-06 | Stop reason: HOSPADM

## 2024-02-05 RX ORDER — DEXAMETHASONE SODIUM PHOSPHATE 4 MG/ML
INJECTION, SOLUTION INTRA-ARTICULAR; INTRALESIONAL; INTRAMUSCULAR; INTRAVENOUS; SOFT TISSUE PRN
Status: DISCONTINUED | OUTPATIENT
Start: 2024-02-05 | End: 2024-02-05 | Stop reason: HOSPADM

## 2024-02-05 RX ORDER — OXYCODONE HYDROCHLORIDE 5 MG/1
10 TABLET ORAL
Status: DISCONTINUED | OUTPATIENT
Start: 2024-02-05 | End: 2024-02-06 | Stop reason: HOSPADM

## 2024-02-05 RX ORDER — LIDOCAINE HYDROCHLORIDE 10 MG/ML
INJECTION, SOLUTION EPIDURAL; INFILTRATION; INTRACAUDAL; PERINEURAL PRN
Status: DISCONTINUED | OUTPATIENT
Start: 2024-02-05 | End: 2024-02-05 | Stop reason: HOSPADM

## 2024-02-05 RX ORDER — SODIUM CHLORIDE, SODIUM LACTATE, POTASSIUM CHLORIDE, CALCIUM CHLORIDE 600; 310; 30; 20 MG/100ML; MG/100ML; MG/100ML; MG/100ML
INJECTION, SOLUTION INTRAVENOUS CONTINUOUS
Status: DISCONTINUED | OUTPATIENT
Start: 2024-02-05 | End: 2024-02-06 | Stop reason: HOSPADM

## 2024-02-05 RX ORDER — CYCLOPENTOLAT/TROPIC/PHENYLEPH 1%-1%-2.5%
1 DROPS (EA) OPHTHALMIC (EYE)
Status: COMPLETED | OUTPATIENT
Start: 2024-02-05 | End: 2024-02-05

## 2024-02-05 RX ORDER — HEPARIN SODIUM,PORCINE 10 UNIT/ML
5-10 VIAL (ML) INTRAVENOUS
Status: DISCONTINUED | OUTPATIENT
Start: 2024-02-05 | End: 2024-02-06 | Stop reason: HOSPADM

## 2024-02-05 RX ORDER — ONDANSETRON 4 MG/1
4 TABLET, ORALLY DISINTEGRATING ORAL EVERY 30 MIN PRN
Status: DISCONTINUED | OUTPATIENT
Start: 2024-02-05 | End: 2024-02-06 | Stop reason: HOSPADM

## 2024-02-05 RX ORDER — ACETAMINOPHEN 325 MG/1
975 TABLET ORAL ONCE
Status: COMPLETED | OUTPATIENT
Start: 2024-02-05 | End: 2024-02-05

## 2024-02-05 RX ORDER — FENTANYL CITRATE 50 UG/ML
25 INJECTION, SOLUTION INTRAMUSCULAR; INTRAVENOUS EVERY 5 MIN PRN
Status: DISCONTINUED | OUTPATIENT
Start: 2024-02-05 | End: 2024-02-06 | Stop reason: HOSPADM

## 2024-02-05 RX ORDER — HEPARIN SODIUM (PORCINE) LOCK FLUSH IV SOLN 100 UNIT/ML 100 UNIT/ML
5-10 SOLUTION INTRAVENOUS
Status: DISCONTINUED | OUTPATIENT
Start: 2024-02-05 | End: 2024-02-06 | Stop reason: HOSPADM

## 2024-02-05 RX ORDER — HYDROMORPHONE HYDROCHLORIDE 1 MG/ML
0.2 INJECTION, SOLUTION INTRAMUSCULAR; INTRAVENOUS; SUBCUTANEOUS EVERY 5 MIN PRN
Status: DISCONTINUED | OUTPATIENT
Start: 2024-02-05 | End: 2024-02-06 | Stop reason: HOSPADM

## 2024-02-05 RX ORDER — TETRACAINE HYDROCHLORIDE 5 MG/ML
SOLUTION OPHTHALMIC PRN
Status: DISCONTINUED | OUTPATIENT
Start: 2024-02-05 | End: 2024-02-05 | Stop reason: HOSPADM

## 2024-02-05 RX ORDER — PROPARACAINE HYDROCHLORIDE 5 MG/ML
1 SOLUTION/ DROPS OPHTHALMIC ONCE
Status: COMPLETED | OUTPATIENT
Start: 2024-02-05 | End: 2024-02-05

## 2024-02-05 RX ORDER — LIDOCAINE HYDROCHLORIDE 20 MG/ML
INJECTION, SOLUTION INFILTRATION; PERINEURAL PRN
Status: DISCONTINUED | OUTPATIENT
Start: 2024-02-05 | End: 2024-02-05

## 2024-02-05 RX ORDER — TIMOLOL MALEATE 5 MG/ML
SOLUTION/ DROPS OPHTHALMIC PRN
Status: DISCONTINUED | OUTPATIENT
Start: 2024-02-05 | End: 2024-02-05 | Stop reason: HOSPADM

## 2024-02-05 RX ORDER — LIDOCAINE 40 MG/G
CREAM TOPICAL
Status: DISCONTINUED | OUTPATIENT
Start: 2024-02-05 | End: 2024-02-06 | Stop reason: HOSPADM

## 2024-02-05 RX ORDER — FENTANYL CITRATE 50 UG/ML
INJECTION, SOLUTION INTRAMUSCULAR; INTRAVENOUS PRN
Status: DISCONTINUED | OUTPATIENT
Start: 2024-02-05 | End: 2024-02-05

## 2024-02-05 RX ORDER — HYDROMORPHONE HYDROCHLORIDE 1 MG/ML
0.4 INJECTION, SOLUTION INTRAMUSCULAR; INTRAVENOUS; SUBCUTANEOUS EVERY 5 MIN PRN
Status: DISCONTINUED | OUTPATIENT
Start: 2024-02-05 | End: 2024-02-06 | Stop reason: HOSPADM

## 2024-02-05 RX ORDER — HEPARIN SODIUM,PORCINE 10 UNIT/ML
5-10 VIAL (ML) INTRAVENOUS EVERY 24 HOURS
Status: DISCONTINUED | OUTPATIENT
Start: 2024-02-05 | End: 2024-02-06 | Stop reason: HOSPADM

## 2024-02-05 RX ORDER — FENTANYL CITRATE 50 UG/ML
50 INJECTION, SOLUTION INTRAMUSCULAR; INTRAVENOUS EVERY 5 MIN PRN
Status: DISCONTINUED | OUTPATIENT
Start: 2024-02-05 | End: 2024-02-06 | Stop reason: HOSPADM

## 2024-02-05 RX ORDER — MOXIFLOXACIN IN NACL,ISO-OS/PF 0.3MG/0.3
SYRINGE (ML) INTRAOCULAR PRN
Status: DISCONTINUED | OUTPATIENT
Start: 2024-02-05 | End: 2024-02-05 | Stop reason: HOSPADM

## 2024-02-05 RX ORDER — PROPOFOL 10 MG/ML
INJECTION, EMULSION INTRAVENOUS PRN
Status: DISCONTINUED | OUTPATIENT
Start: 2024-02-05 | End: 2024-02-05

## 2024-02-05 RX ORDER — MOXIFLOXACIN 5 MG/ML
1 SOLUTION/ DROPS OPHTHALMIC 3 TIMES DAILY
Qty: 3 ML | Refills: 1 | Status: SHIPPED | OUTPATIENT
Start: 2024-02-05 | End: 2024-02-27

## 2024-02-05 RX ADMIN — ACETAMINOPHEN 975 MG: 325 TABLET ORAL at 09:07

## 2024-02-05 RX ADMIN — SODIUM CHLORIDE, SODIUM LACTATE, POTASSIUM CHLORIDE, CALCIUM CHLORIDE: 600; 310; 30; 20 INJECTION, SOLUTION INTRAVENOUS at 09:29

## 2024-02-05 RX ADMIN — HEPARIN SODIUM (PORCINE) LOCK FLUSH IV SOLN 100 UNIT/ML 5 ML: 100 SOLUTION at 11:50

## 2024-02-05 RX ADMIN — Medication 1 DROP: at 09:00

## 2024-02-05 RX ADMIN — Medication 1 DROP: at 08:48

## 2024-02-05 RX ADMIN — PROPOFOL 60 MG: 10 INJECTION, EMULSION INTRAVENOUS at 10:32

## 2024-02-05 RX ADMIN — FENTANYL CITRATE 50 MCG: 50 INJECTION, SOLUTION INTRAMUSCULAR; INTRAVENOUS at 10:43

## 2024-02-05 RX ADMIN — LIDOCAINE HYDROCHLORIDE 100 MG: 20 INJECTION, SOLUTION INFILTRATION; PERINEURAL at 10:32

## 2024-02-05 RX ADMIN — PROPARACAINE HYDROCHLORIDE 1 DROP: 5 SOLUTION/ DROPS OPHTHALMIC at 08:48

## 2024-02-05 RX ADMIN — Medication 1 DROP: at 09:07

## 2024-02-05 ASSESSMENT — ENCOUNTER SYMPTOMS: SEIZURES: 0

## 2024-02-05 ASSESSMENT — COPD QUESTIONNAIRES: COPD: 0

## 2024-02-05 ASSESSMENT — VISUAL ACUITY
OD_SC: 20/63
METHOD: SNELLEN - LINEAR

## 2024-02-05 ASSESSMENT — SLIT LAMP EXAM - LIDS: COMMENTS: 1+ BLEPH, THICKENED MARGINS, 1-2+ MGD

## 2024-02-05 ASSESSMENT — TONOMETRY
OD_IOP_MMHG: 16
IOP_METHOD: TONOPEN

## 2024-02-05 ASSESSMENT — EXTERNAL EXAM - RIGHT EYE: OD_EXAM: DERMATOCHALASIS

## 2024-02-05 ASSESSMENT — LIFESTYLE VARIABLES: TOBACCO_USE: 1

## 2024-02-05 NOTE — ANESTHESIA CARE TRANSFER NOTE
Patient: Nik Nava    Procedure: Procedure(s):  RIGHT EYE PHACOEMULSIFICATION, CATARACT, WITH STANDARD INTRAOCULAR LENS IMPLANT INSERTION       Diagnosis: Combined forms of age-related cataract of both eyes [H25.813]  Dry eye [H04.129]  Diagnosis Additional Information: No value filed.    Anesthesia Type:   MAC     Note:    Oropharynx: oropharynx clear of all foreign objects and spontaneously breathing  Level of Consciousness: awake  Oxygen Supplementation: room air    Independent Airway: airway patency satisfactory and stable  Dentition: dentition unchanged  Vital Signs Stable: post-procedure vital signs reviewed and stable  Report to RN Given: handoff report given  Patient transferred to: Phase II  Comments: VSS and WNL, comfortable, no PONV, report to PACU RN  Handoff Report: Identifed the Patient, Identified the Reponsible Provider, Reviewed the pertinent medical history, Discussed the surgical course, Reviewed Intra-OP anesthesia mangement and issues during anesthesia, Set expectations for post-procedure period and Allowed opportunity for questions and acknowledgement of understanding      Vitals:  Vitals Value Taken Time   BP     Temp     Pulse     Resp     SpO2         Electronically Signed By: PEE Arrington CRNA  February 5, 2024  11:00 AM

## 2024-02-05 NOTE — ANESTHESIA PREPROCEDURE EVALUATION
Pre-Operative H & P     CC:  Preoperative exam to assess for increased cardiopulmonary risk while undergoing surgery and anesthesia.    Date of Encounter: 1/8/2024  Primary Care Physician:  Wojciech Soares     Reason for visit:   Encounter Diagnosis   Name Primary?    Combined forms of age-related cataract of both eyes Yes       HPI  Nik Nava is a 63 year old male who presents for pre-operative H & P in preparation for  Procedure Information       Case: [id number] Date/Time: 02/05/24 1010    Procedure: RIGHT EYE PHACOEMULSIFICATION, CATARACT, WITH STANDARD INTRAOCULAR LENS IMPLANT INSERTION (Right: Eye)    Anesthesia type: MAC with Retrobulbar    Diagnosis:       Combined forms of age-related cataract of both eyes [H25.813]      Dry eye [H04.129]    Pre-op diagnosis:       Combined forms of age-related cataract of both eyes [H25.813]      Dry eye [H04.129]    Location: [place] [address] / [facility]    Providers: Deshawn Menezes MD            Patient is being evaluated for comorbid conditions of ALL s/p peripheral stem cell transplant, GVHD, RCC, postablative hypothyroidism, h/o PE, KISHAN    Mr. Nava has a history fo bilateral cataracts. He was seen by Dr. Menezes and is now scheduled for the above procedure.     History is obtained from the patient and chart review. Patient's wife was also present for this visit     Hx of abnormal bleeding or anti-platelet use: denies      Past Medical History  Past Medical History:   Diagnosis Date    ALL (acute lymphocytic leukemia) (H)     CKD (chronic kidney disease)     GVHD (graft versus host disease) (H)     H/O peripheral stem cell transplant (H)     Hyperthyroidism 1996    Infection due to 2019 novel coronavirus 01/16/2023    Influenza A 11/2022    Postablative hypothyroidism 1997    Pulmonary embolism (H)     Renal cell carcinoma (H) 2007    right kidney    Sleep apnea        Past Surgical History  Past Surgical  History:   Procedure Laterality Date    APPENDECTOMY      BACK SURGERY  2017    BONE MARROW BIOPSY, BONE SPECIMEN, NEEDLE/TROCAR Left 09/02/2021    Procedure: BIOPSY, BONE MARROW;  Surgeon: Jailyn Ny APRN CNP;  Location: [facility] OR    BONE MARROW BIOPSY, BONE SPECIMEN, NEEDLE/TROCAR Left 11/15/2021    Procedure: BIOPSY, BONE MARROW;  Surgeon: Socrates De La Torre;  Location: [facility] OR    BONE MARROW BIOPSY, BONE SPECIMEN, NEEDLE/TROCAR Left 02/07/2022    Procedure: BIOPSY, BONE MARROW;  Surgeon: Renetta Wiggins PA-C;  Location: [facility] OR    BONE MARROW BIOPSY, BONE SPECIMEN, NEEDLE/TROCAR Left 08/18/2022    Procedure: BIOPSY, BONE MARROW;  Surgeon: Lorrie Yap PA-C;  Location: [facility] OR    BONE MARROW BIOPSY, BONE SPECIMEN, NEEDLE/TROCAR Left 08/07/2023    Procedure: Bone marrow biopsy, bone specimen, needle/trocar;  Surgeon: Teressa Rick PA-C;  Location: [facility] OR    BRONCHOSCOPY (RIGID OR FLEXIBLE), DIAGNOSTIC N/A 04/29/2022    Procedure: BRONCHOSCOPY, WITH BRONCHOALVEOLAR LAVAGE;  Surgeon: Murtaza Garcia MD;  Location: [facility] GI    IR CVC TUNNEL PLACEMENT > 5 YRS OF AGE  08/04/2021    IR CVC TUNNEL REMOVAL LEFT  09/02/2021    IR LIVER BIOPSY PERCUTANEOUS  02/21/2022    NEPHRECTOMY  2007    ORTHOPEDIC SURGERY      bilateral shoulder surgeries    PERCUTANEOUS BIOPSY LIVER N/A 02/21/2022    Procedure: NEEDLE BIOPSY, LIVER, PERCUTANEOUS;  Surgeon: Trace Castellanos MD;  Location: [facility] OR    PHACOEMULSIFICATION WITH STANDARD INTRAOCULAR LENS IMPLANT Left 1/15/2024    Procedure: LEFT EYE PHACOEMULSIFICATION, CATARACT, WITH STANDARD INTRAOCULAR LENS IMPLANT INSERTION;  Surgeon: Deshawn Menezes MD;  Location: [facility] OR       Prior to Admission Medications  Current Outpatient Medications   Medication Sig Dispense Refill    acetylcysteine 10 % (MUCOMYST) 10% SOLN Place 1 drop into the right eye 4 times daily 30 mL 11    acyclovir (ZOVIRAX) 400 MG tablet Take 2 tablets (800 mg) by mouth every 12  hours 120 tablet 3    amLODIPine (NORVASC) 5 MG tablet Take 1 tablet (5 mg) by mouth daily      chlorthalidone (HYGROTON) 25 MG tablet Take 0.5 tablets (12.5 mg) by mouth daily 30 tablet 0    escitalopram (LEXAPRO) 10 MG tablet Take 1 tablet (10 mg) by mouth daily (Patient taking differently: Take 10 mg by mouth every evening) 30 tablet 3    hydrOXYzine (VISTARIL) 25 MG capsule Take 25 mg by mouth as needed      levothyroxine (SYNTHROID/LEVOTHROID) 125 MCG tablet Take 1 tablet (125 mcg) by mouth daily 90 tablet 4    moxifloxacin (VIGAMOX) 0.5 % ophthalmic solution Apply 1 drop to eye 3 times daily To operative eye 3 mL 1    Multiple Vitamins-Minerals (MENS MULTIVITAMIN) TABS Take 1 tablet by mouth every morning      omeprazole (PRILOSEC) 40 MG DR capsule Take 1 capsule (40 mg) by mouth daily (Patient taking differently: Take 40 mg by mouth every morning) 30 capsule 3    penicillin V (VEETID) 250 MG tablet Take 1 tablet (250 mg) by mouth 2 times daily 60 tablet 3    prednisoLONE acetate (PRED FORTE) 1 % ophthalmic suspension Apply 1 drop to eye 4 times daily To operative eye 10 mL 1    predniSONE (DELTASONE) 1 MG tablet Take 4 tablets (4 mg) by mouth daily Take on even days 120 tablet 3    rosuvastatin (CRESTOR) 5 MG tablet Take 1 tablet (5 mg) by mouth daily (Patient taking differently: Take 5 mg by mouth every evening) 30 tablet 3    acetaminophen (TYLENOL) 500 MG tablet Take 500 mg by mouth as needed      Carboxymethylcell-Glycerin PF (REFRESH RELIEVA PF) 0.5-1 % SOLN Apply 1 drop to eye 4 times daily Preservative free. Either PF multidose bottle or PF vials 30 each 11    dexAMETHasone alcohol-free (DECADRON) 0.1 MG/ML solution Swish and spit 1 mg in mouth 4 times daily Hold while on Valtrex      fluorometholone (FML LIQUIFILM) 0.1 % ophthalmic suspension Place 1 drop into both eyes 4 times daily (Patient taking differently: Place 1 drop into both eyes 2 times daily) 10 mL 1    LORazepam (ATIVAN) 1 MG tablet Take  2 tablets (2 mg) by mouth nightly as needed for anxiety or sleep 30 tablet 3    moxifloxacin (VIGAMOX) 0.5 % ophthalmic solution Apply 1 drop to eye 3 times daily To operative eye 3 mL 1    nicotine polacrilex (NICORETTE) 4 MG gum Place 4 mg inside cheek daily      Nutritional Supplements (ENSURE COMPLETE SHAKE) LIQD Take 11 mLs by mouth every morning      oxyCODONE (ROXICODONE) 5 MG tablet Take 2 tablets (10 mg) by mouth every 6 hours as needed for pain (Patient taking differently: Take 5 mg by mouth every 6 hours as needed for pain 1-2 tablets every 6 hours if needed) 30 tablet 0    polyethylene glycol (MIRALAX) 17 GM/Dose powder Take 1 Capful by mouth as needed      prednisoLONE acetate (PRED FORTE) 1 % ophthalmic suspension Apply 1 drop to eye 4 times daily To operative eye 10 mL 1    predniSONE (DELTASONE) 10 MG tablet Take 1 tablet (10 mg) by mouth daily 30 tablet 1    predniSONE (DELTASONE) 20 MG tablet Take 1 tablet (20 mg) by mouth daily (Patient taking differently: Take 30 mg by mouth See Admin Instructions Taking 30 mg every other day.) 50 tablet 1    predniSONE (DELTASONE) 5 MG tablet Take 2 tablets (10 mg) by mouth every other day Take on odd days (Patient taking differently: Take 40 mg by mouth every other day Taking every other day) 60 tablet 3    sennosides (SENOKOT) 8.6 MG tablet Take 1 tablet by mouth 3 times daily as needed for constipation (Patient taking differently: Take 1 tablet by mouth every evening) 90 tablet 0    sodium chloride 0.9 % neb solution 3 mLs by Other route 2 times daily 300 mL 11       Allergies  Allergies   Allergen Reactions    Sulfamethoxazole-Trimethoprim Rash       Social History  Social History     Socioeconomic History    Marital status:      Spouse name: Not on file    Number of children: Not on file    Years of education: Not on file    Highest education level: Not on file   Occupational History    Not on file   Tobacco Use    Smoking status: Former      Packs/day: 2.00     Years: 30.00     Additional pack years: 0.00     Total pack years: 60.00     Types: Cigarettes     Quit date: 2019     Years since quittin.7     Passive exposure: Past    Smokeless tobacco: Never    Tobacco comments:     DAILY USE OF NICORETTE GUM   Vaping Use    Vaping Use: Never used   Substance and Sexual Activity    Alcohol use: Not Currently    Drug use: Never    Sexual activity: Not on file   Other Topics Concern    Parent/sibling w/ CABG, MI or angioplasty before 65F 55M? Not Asked   Social History Narrative    Not on file     Social Determinants of Health     Financial Resource Strain: Not on file   Food Insecurity: Not on file   Transportation Needs: Not on file   Physical Activity: Not on file   Stress: Not on file   Social Connections: Not on file   Interpersonal Safety: Not At Risk (2022)    Humiliation, Afraid, Rape, and Kick questionnaire     Fear of Current or Ex-Partner: No     Emotionally Abused: No     Physically Abused: No     Sexually Abused: No   Housing Stability: Not on file       Family History  Family History   Problem Relation Age of Onset    Lung Cancer Mother     Depression Mother     Polycythemia Father     Anesthesia Reaction Father         slow to awaken    Hypothyroidism Sister     Coronary Artery Disease Maternal Grandmother     Diabetes Maternal Grandmother     Hypertension Maternal Grandmother     Glaucoma No family hx of     Macular Degeneration No family hx of     Venous thrombosis No family hx of        Review of Systems  The complete review of systems is negative other than noted in the HPI or here.   Anesthesia Evaluation   Pt has had prior anesthetic.     No history of anesthetic complications       ROS/MED HX  ENT/Pulmonary: Comment: Bilateral cataracts     (+) sleep apnea, doesn't use CPAP,   KISHAN risk factors, snores loudly, hypertension,         tobacco use, Past use,                    (-) asthma and COPD   Neurologic:  - neg neurologic  ROS  (-) no seizures and no CVA   Cardiovascular:     (+) Dyslipidemia hypertension- -   -  - -                                 Previous cardiac testing   Echo: Date: 12/2/22 Results:  Interpretation Summary  Global and regional left ventricular function is normal with an EF of 55-60%.  The right ventricle is normal size.  Global right ventricular function is normal.  Pulmonary artery systolic pressure cannot be assessed.  IVC diameter <2.1 cm collapsing >50% with sniff suggests a normal RA pressure  of 3 mmHg.  No pericardial effusion is present.    Stress Test:  Date: 6/30/20 Results:  FINAL CONCLUSIONS     1. Probably normal myocardial perfusion study, despite diaphragmatic/soft   tissue attenuation artifact which may decrease sensitivity for coronary   artery disease.   2. No obvious inducible ischemia or infarction.   3. Normal left ventricular size & calculated ejection fraction; no obvious   left ventricular regional wall motion abnormalities noted.     No previous study is available for comparison.     ECG Reviewed:  Date: 8/27/23 Results:  SB  Cath:  Date: Results:   (-) CAD and CHF   METS/Exercise Tolerance: >4 METS Comment: Walking to the store, ascending stairs in the home.   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 Now resolved per MRI this fall. Reports minimal back pain.      GI/Hepatic:     (+) GERD, Asymptomatic on medication,               (-) liver disease   Renal/Genitourinary: Comment: - Right RCC s/p right nephrectomy (2007)    (+) renal disease,  type: CRI, Pt does not require dialysis,           Endo:     (+)          thyroid problem, hypothyroidism, Chronic steroid usage for Other. Date most recently used: GVHD.        Psychiatric/Substance Use:  - neg psychiatric ROS     Infectious Disease:       Malignancy:   (+) Malignancy, History of Lymphoma/Leukemia and Other.Lymph CA Remission status post Surgery, Chemo and Radiation.  Other CA Right RCC Remission status post Surgery.    Other:            Virtual visit -  No vitals were obtained    Physical Exam  Constitutional: Awake, alert, cooperative, no apparent distress, and appears stated age.  HENT: Normocephalic  Respiratory: non labored breathing   Neurologic: Awake, alert, oriented to name, place and time.   Neuropsychiatric: Calm, cooperative. Normal affect.      Prior Labs/Diagnostic Studies   All labs and imaging personally reviewed     EKG/ stress test - if available please see in ROS above   Echo result w/o MOPS: Interpretation SummaryGlobal and regional left ventricular function is normal with an EF of 55-60%.The right ventricle is normal size.Global right ventricular function is normal.Pulmonary artery systolic pressure cannot be assessed.IVC diameter <2.1 cm collapsing >50% with sniff suggests a normal RA pressureof 3 mmHg.No pericardial effusion is present.          Latest Ref Rng & Units 9/21/2023    12:46 PM   PFT   FVC L 4.48    FEV1 L 3.17    FVC% % 99    FEV1% % 91          The patient's records and results personally reviewed by this provider.     Outside records reviewed from: Care Everywhere      Assessment    Nik Nava is a 63 year old male seen as a PAC referral for risk assessment and optimization for anesthesia.    Plan/Recommendations  Pt will be optimized for the proposed procedure.  See below for details on the assessment, risk, and preoperative recommendations    NEUROLOGY  - No history of TIA, CVA or seizure  -Post Op delirium risk factors:  No risk identified    ENT  - No  "current airway concerns.  Will need to be reassessed day of surgery.  Mallampati: Unable to assess  TM: Unable to assess    CARDIAC  - No history of CAD and Afib  -hypertension using amlodipine  -dyslipidemia using rovustatin   -denies cardiac symptoms.   -previous cardiac testing above   - METS (Metabolic Equivalents)  Patient performs 4 or more METS exercise without symptoms            Total Score: 0      RCRI-Very low risk: Class 1 0.4% complication rate            Total Score: 0        PULMONARY  -h/o KISHAN, Improved after weightloss  - Denies asthma or inhaler use  - Tobacco History    History   Smoking Status    Former    Packs/day: 2.00    Years: 30.00    Types: Cigarettes    Quit date: 5/4/2019   Smokeless Tobacco    Never       GI  - GERD  Controlled on medications: Proton Pump Inhibitor  PONV Medium Risk  Total Score: 2           1 AN PONV: Patient is not a current smoker    1 AN PONV: Intended Post Op Opioids        /RENAL  -h/o RCC s/p nephrectomy  -CRI, baseline Cr 1.17    ENDOCRINE    - BMI: Estimated body mass index is 32.98 kg/m  as calculated from the following:    Height as of this encounter: 1.854 m (6' 1\").    Weight as of this encounter: 113.4 kg (250 lb).  Obesity (BMI >30)  - No history of Diabetes Mellitus  -hypothyroid     HEME  VTE High Risk 3%            Total Score: 10    VTE: Greater than 59 yrs old    VTE: Male    VTE: Pt history of VTE    VTE: Family Hx of VTE      - No history of abnormal bleeding or antiplatelet use.  -ALL s/p BMT, chemo, radiation. Follows with BMT  -GVHD. Recent oral symptoms and steroids were increased. Surgeon is aware of the increase in steroids  -h/o PE. Completed anticoagulation.     Different anesthesia methods/types have been discussed with the patient, but they are aware that the final plan will be decided by the assigned anesthesia provider on the date of service.    The patient is optimized for their procedure. AVS with information on surgery time/arrival " time, meds and NPO status given by nursing staff. No further diagnostic testing indicated.    Please refer to the physical examination documented by the anesthesiologist in the anesthesia record on the day of surgery.    Video-Visit Details    Type of service:  Video Visit    Provider received verbal consent for a Video Visit from the patient? Yes     Originating Location (pt. Location): Home    Distant Location (provider location):  Off-site  Mode of Communication:  Video Conference via [employer]  On the day of service:     Prep time: 13 minutes  Visit time: 10 minutes  Documentation time: 3 minutes  ------------------------------------------  Total time: 26 minutes      Teressa Begum PA-C  Preoperative Assessment Center  [facility]  Phone: [phone]  Fax: [phone]    Physical Exam    Airway        Mallampati: III   TM distance: > 3 FB   Neck ROM: full   Mouth opening: < 3 cm    Respiratory Devices and Support         Dental       (+) Minor Abnormalities - some fillings, tiny chips      Cardiovascular             Pulmonary                   Anesthesia Plan    ASA Status:  3    NPO Status:  NPO Appropriate    Anesthesia Type: MAC.     - Reason for MAC: straight local not clinically adequate   Induction: Intravenous, Propofol.           Consents    Anesthesia Plan(s) and associated risks, benefits, and realistic alternatives discussed. Questions answered and patient/representative(s) expressed understanding.     - Discussed:     - Discussed with:  Patient      - Extended Intubation/Ventilatory Support Discussed: No.      - Patient is DNR/DNI Status: No          Postoperative Care    Pain management: IV analgesics.   PONV prophylaxis: Background Propofol Infusion     Comments:

## 2024-02-05 NOTE — ANESTHESIA POSTPROCEDURE EVALUATION
Patient: Nik Nava    Procedure: Procedure(s):  RIGHT EYE PHACOEMULSIFICATION, CATARACT, WITH STANDARD INTRAOCULAR LENS IMPLANT INSERTION       Anesthesia Type:  MAC    Note:  Disposition: Outpatient   Postop Pain Control: Uneventful            Sign Out: Well controlled pain   PONV: No   Neuro/Psych: Uneventful            Sign Out: Acceptable/Baseline neuro status   Airway/Respiratory: Uneventful            Sign Out: Acceptable/Baseline resp. status   CV/Hemodynamics: Uneventful            Sign Out: Acceptable CV status; No obvious hypovolemia; No obvious fluid overload   Other NRE: NONE   DID A NON-ROUTINE EVENT OCCUR? No           Last vitals:  Vitals Value Taken Time   /79 02/05/24 1130   Temp 36.1  C (97  F) 02/05/24 1130   Pulse     Resp 16 02/05/24 1130   SpO2 96 % 02/05/24 1130       Electronically Signed By: Benji Ma MD  February 5, 2024  12:30 PM

## 2024-02-05 NOTE — OP NOTE
PREOPERATIVE DIAGNOSIS:   1. Combined forms of age-related cataract of both eyes     Right eye   POSTOPERATIVE DIAGNOSIS: Same   PROCEDURES:   1. Cataract extraction with intraocular lens implant Right eye.  SURGEON: Deshawn Menezes M.D.  Assistant: Jolanta Thompson MD          INDICATIONS: The patient Nik Nava presented to the eye clinic with decreased vision secondary to cataract in the Right eye. The risks, benefits and alternatives to cataract extraction were discussed. The patient elected to proceed. All questions were answered to the patient's satisfaction.   DESCRIPTION OF PROCEDURE:Prior to the procedure, appropriate cardiac and respiratory monitors were applied to the patient.  In the pre-operative holding area, under monitored anesthesia care, a retrobulbar injection of 2% lidocaine with hyaluronidase was given.  The patient was brought to the operating room where a surgical pause was carried out to identify with all members of the surgical team the correct surgical site.  With adequate anesthesia and akinesia, the Right eye was prepped and draped in the usual sterile fashion. A lid speculum was placed, and the operating microscope was rotated into position. A paracentesis was created.  Through this limbal paracentesis, the anterior chamber was inflated with dispersive viscoelastic. A temporal wound was created at the limbus using a 2.5 mm blade. A capsulorrhexis was initiated using a bent 25-gauge needle and was completed in continuous and circular fashion using the capsulorrhexis forceps. The lens nucleus was hydrodissected using balanced salt solution.  The lens nucleus was rotated and removed using phacoemulsification in a stop and chop technique.  Residual cortical material was removed using irrigation-aspiration.  The capsular bag was reinflated to its maximal extent with cohesive viscoelastic.  A 22.5 diopter [id number] was inserted into the capsular bag.  The lens power selected was reviewed  using the intraocular lens power measurements that were obtained preoperatively to confirm that the correct lens was selected for the desired post-operative refractive state. The residual viscoelastic was removed in its entirety, the wound were hydrated and found to be self-sealing.  Intracameral moxifloxacin was administered. Tactile pressure was confirmed to be in a normal range. Subconjunctival Dexamethasone (2 mg) was injected. The lid speculum was removed and a patch and shield were applied.  The patient tolerated the procedure well, and there were no complications.   PLAN: The patient will be discharged to home and will follow up tomorrow morning in the eye clinic.  EBL:  None  Complications:  None  Implant Name Type Inv. Item Serial No.  Lot No. LRB No. Used Action   LENS SY60WF 22.5 [employer] BLF ASPHERIC BICONVEX IOL - [id number] Lens/Eye Implant LENS SY60WF 22.5 [employer] BLF ASPHERIC BICONVEX IOL [id number] [employer]  Right 1 Implanted         Attending Physician Procedure Attestation: I was present for the entire procedure       Deshawn Menezes MD  , Comprehensive Ophthalmology  Department of Ophthalmology and Visual Neurosciences  [facility]

## 2024-02-05 NOTE — PROGRESS NOTES
# POD#0, status post cataract surgery, RIGHT eye  - No complaints.  Denies eye pain.    Base Eye Exam       Visual Acuity (Snellen - Linear)         Right Left    Dist sc 20/63               Tonometry (Tonopen, 11:23 AM)         Right Left    Pressure 16               Neuro/Psych       Oriented x3: Yes    Mood/Affect: Normal                  Slit Lamp and Fundus Exam       External Exam         Right Left    External Dermatochalasis               Slit Lamp Exam         Right Left    Lids/Lashes 1+ bleph, thickened margins, 1-2+ MGD     Conjunctiva/Sclera tr injection     Cornea temporal edema, suture in main wound temporally     Anterior Chamber Deep, 2+ cell     Iris Dilated     Lens PCIOL, clear well centered     Anterior Vitreous Normal                        Impression  # Pseudophakia, RIGHT eye  - Uncomplicated phacoemulsification CE/IOL.  - POD0, good post-operative appearance, wounds sealed. IOP reasonable.     Plan  - Prednisolone acetate to operative eye: 4x/day for 1 week, 3x/day for 1 week, 2x/day for 1 week, 1x/day for 1 week.   - Moxifloxacin 3x/day to operative eye for 7 days.     - Eye protection at all times and eye shield at night for 1 week.  - Limited activities with no exercise or heavy lifting for 1 week.     - Instructed patient to contact us for decreasing vision, eye pain, new floaters or flashes of light or other concerning symptoms.  - Written instructions given  - Return to clinic for planned post-operative visit.         Jolanta Thompson MD  Ophthalmology, PGY-4    Attending Physician Attestation:  Complete documentation of historical and exam elements from today's encounter can be found in the full encounter summary report (not reduplicated in this progress note).  I personally obtained the chief complaint(s) and history of present illness.  I confirmed and edited as necessary the review of systems, past medical/surgical history, family history, social history, and examination findings as  documented by others; and I examined the patient myself.  I personally reviewed the relevant tests, images, and reports as documented above.  I formulated and edited as necessary the assessment and plan and discussed the findings and management plan with the patient and family. . - Deshawn Menezes MD

## 2024-02-07 ENCOUNTER — TELEPHONE (OUTPATIENT)
Dept: OPHTHALMOLOGY | Facility: CLINIC | Age: 64
End: 2024-02-07
Payer: MEDICARE

## 2024-02-07 ENCOUNTER — CARE COORDINATION (OUTPATIENT)
Dept: TRANSPLANT | Facility: CLINIC | Age: 64
End: 2024-02-07
Payer: MEDICARE

## 2024-02-07 NOTE — TELEPHONE ENCOUNTER
Dr. Menezes will be out next week and recommended pt coming in tomorrow for post-op check.    Pt has dental appt tomorrow afternoon in [place].    Scheduled pt at 0830 with Dr. Menezes tomorrow.    Pt aware of date/time/location at [facility].    Chris Mireles RN 1:55 PM 02/07/24

## 2024-02-07 NOTE — PROGRESS NOTES
Patient's wife called and reported Nik has had increased gas, bloating. She had wondered if penicillin could be contributing and how long he should be on it. Discussed with Dr. Avila Tobar and he should remain on penicillin while he is also on prednisone. Patient alternating 20mg and 10mg this week and will call with any concerning sx.

## 2024-02-07 NOTE — TELEPHONE ENCOUNTER
[facility] Call Center    Phone Message    May a detailed message be left on voicemail: yes     Reason for Call: Symptoms or Concerns     If patient has red-flag symptoms, warm transfer to triage line    Current symptom or concern: PT HAD SURGERY W/ PROVIDER ON 2/5 AND STATES THAT SINCE THEN HE HAS BEEN EXPRENCING SOME BLURR VISION HE WANTED TPO DISCUSS WITH SOMEONE    Symptoms have been present for:   day(s)    Has patient previously been seen for this? Yes    By :     Date: N/A    Are there any new or worsening symptoms? No    Action Taken: Message routed to:  [facility] E[facility]    Travel Screening: Not Applicable

## 2024-02-07 NOTE — TELEPHONE ENCOUNTER
Spoke to pt at 1300    Right eye cataract surgery on Monday and states visiion still real blurry-- felt left eye vision was much better after surgeyr    Reviewed vision each eye at same day post-op 20/63 with inflammation noted in each eye.    Reviewed vision should be improving over course of week with steroid eye drops    Pt feels right eye vision about 10 % better today.    Reviewed if has any worsening of vision/eye pain to notify clinic.    Pt verbally demonstrated understanding and seemed comfortable with information.    Note to Dr. Menezes for review/amendment if applicable.    Chris Mireles RN 1:08 PM 02/07/24

## 2024-02-08 ENCOUNTER — OFFICE VISIT (OUTPATIENT)
Dept: OPHTHALMOLOGY | Facility: CLINIC | Age: 64
End: 2024-02-08
Attending: OPHTHALMOLOGY
Payer: MEDICARE

## 2024-02-08 DIAGNOSIS — H16.123 FILAMENTARY KERATITIS OF BOTH EYES: ICD-10-CM

## 2024-02-08 DIAGNOSIS — D89.813 GVHD AS COMPLICATION OF BONE MARROW TRANSPLANT (H): ICD-10-CM

## 2024-02-08 DIAGNOSIS — T86.09 GVHD AS COMPLICATION OF BONE MARROW TRANSPLANT (H): ICD-10-CM

## 2024-02-08 DIAGNOSIS — Z96.1 PSEUDOPHAKIA, BOTH EYES: Primary | ICD-10-CM

## 2024-02-08 PROCEDURE — G0463 HOSPITAL OUTPT CLINIC VISIT: HCPCS | Performed by: OPHTHALMOLOGY

## 2024-02-08 PROCEDURE — 99024 POSTOP FOLLOW-UP VISIT: CPT | Performed by: OPHTHALMOLOGY

## 2024-02-08 ASSESSMENT — VISUAL ACUITY
OS_SC+: +2
OS_PH_SC+: -1
OS_PH_SC: 20/30
OS_SC: 20/60
METHOD: SNELLEN - LINEAR
OD_SC: 20/40
OD_SC+: +2

## 2024-02-08 ASSESSMENT — EXTERNAL EXAM - RIGHT EYE: OD_EXAM: DERMATOCHALASIS

## 2024-02-08 ASSESSMENT — TONOMETRY
OS_IOP_MMHG: 10
OD_IOP_MMHG: 11
IOP_METHOD: TONOPEN

## 2024-02-08 ASSESSMENT — EXTERNAL EXAM - LEFT EYE: OS_EXAM: DERMATOCHALASIS

## 2024-02-08 ASSESSMENT — SLIT LAMP EXAM - LIDS
COMMENTS: 1+ BLEPH, THICKENED MARGINS, 1-2+ MGD
COMMENTS: 1+ BLEPH, THICKENED MARGINS, 1-2+ MGD

## 2024-02-08 NOTE — NURSING NOTE
Chief Complaints and History of Present Illnesses   Patient presents with    Post Op (Ophthalmology) Both Eyes     Cataract extraction with IOL in the right eye on 02/05/2024  Cataract extraction with IOL in the left eye on 01/15/2024        Chief Complaint(s) and History of Present Illness(es)       Post Op (Ophthalmology) Both Eyes              Laterality: both eyes    Course: gradually improving    Associated symptoms: dryness.  Negative for eye pain, redness and photophobia    Treatments tried: eye drops and artificial tears    Pain scale: 2/10    Comments: Cataract extraction with IOL in the right eye on 02/05/2024  Cataract extraction with IOL in the left eye on 01/15/2024                 Comments    Patient states that his vision in the right eye is blurry.  The vision seems to be gradually improving.  His left eye by day 3 was quite clear compared to where his right eye is currently.    LLUVIA Dubois 8:51 AM  February 8, 2024

## 2024-02-09 ENCOUNTER — CARE COORDINATION (OUTPATIENT)
Dept: TRANSPLANT | Facility: CLINIC | Age: 64
End: 2024-02-09

## 2024-02-09 ENCOUNTER — TELEPHONE (OUTPATIENT)
Dept: TRANSPLANT | Facility: CLINIC | Age: 64
End: 2024-02-09

## 2024-02-09 NOTE — PROGRESS NOTES
Patient's wife Lamar called and reported Nik had fever of 101.3 Given Tylenol and reduced to 99.6. Also reporting cough, runny nose, and fatigue. Discussed with Dr. Avila Tobar and recommended patient be seen by PCP. Discussed with wife Lamar and she was able to have him added on this afternoon with his PCP. Will give updates if anything further is needed.

## 2024-02-09 NOTE — TELEPHONE ENCOUNTER
Talked with patient and patient's wife regarding triage call. Patient started on doxycycline by PCP, wondering if they should hold their penicillin V while on the doxycycline. After conversing with Moris (GIRISH) and BMT pharmacist, recommended patient hold their penicillin V while on doxycycline and resume once doxycycline is finished. Patient and patient's wife verbalized understanding and have no further questions or concerns at this time.     Dorina Melendez RN, MSN  BMT Nurse Coordinator  Phone: [phone]

## 2024-02-12 ENCOUNTER — OFFICE VISIT (OUTPATIENT)
Dept: OPTOMETRY | Facility: CLINIC | Age: 64
End: 2024-02-12
Payer: MEDICARE

## 2024-02-12 DIAGNOSIS — T86.09 GVHD AS COMPLICATION OF BONE MARROW TRANSPLANT (H): ICD-10-CM

## 2024-02-12 DIAGNOSIS — D89.813 GVHD AS COMPLICATION OF BONE MARROW TRANSPLANT (H): ICD-10-CM

## 2024-02-12 DIAGNOSIS — Z96.1 PSEUDOPHAKIA: ICD-10-CM

## 2024-02-12 DIAGNOSIS — H04.129 DRY EYE: Primary | ICD-10-CM

## 2024-02-12 ASSESSMENT — VISUAL ACUITY
CORRECTION_TYPE: CONTACTS
OS_CC: 20/20
OD_CC: 20/30
METHOD: SNELLEN - LINEAR

## 2024-02-12 ASSESSMENT — REFRACTION_CURRENTRX
OD_ADDL_SPECS: OPT EXTRA CLEAR, HYDRAPEG
OS_BRAND: ONEFIT
OD_SPHERE: -0.75
OD_BRAND: ONEFIT
OD_DIAMETER: 14.9
OS_BASECURVE: 8.2
OS_ADDL_SPECS: OPT EXTRA BLUE, HYDRAPEG
OS_DIAMETER: 14.9
OD_BASECURVE: 7.9
OS_SPHERE: +0.12

## 2024-02-12 ASSESSMENT — EXTERNAL EXAM - LEFT EYE: OS_EXAM: DERMATOCHALASIS

## 2024-02-12 ASSESSMENT — TONOMETRY
OD_IOP_MMHG: 14
IOP_METHOD: ICARE
OS_IOP_MMHG: 10

## 2024-02-12 ASSESSMENT — SLIT LAMP EXAM - LIDS
COMMENTS: 1+ BLEPH, THICKENED MARGINS, 1-2+ MGD
COMMENTS: 1+ BLEPH, THICKENED MARGINS, 1-2+ MGD

## 2024-02-12 ASSESSMENT — EXTERNAL EXAM - RIGHT EYE: OD_EXAM: DERMATOCHALASIS

## 2024-02-12 NOTE — PROGRESS NOTES
A/P  1.) Dry Eye OU  -s/p BMT 08/2021. Dryness right eye>left eye, symptomatic for photophobia/tearing  -Failed: Restasis/Xiidra (stinging), plugs (scarred puncta), BCL (retention)  -Chronic oral steroid use  -Now excellent response to scleral lens - corneal staining resolved OU. Much improved ocular comfort.   -Wife helping with I&R. Pt on oral steroids causing hands to shake, unable to do himself.  -Right lens now needs refit after surgery. Will do similar lower sag and looser PC's given post-op swelling  -Left eye doing well, no fit adjustment recommended. Small plus OR for improved vision    2.) Pseudophakia OU  -Cataracts progressed by chronic steroid use  -Doing well s/p CE/IOL OU. BCVA with scleral lens 20/20 both eyes. He is somewhat enjoying minimonovision right eye but would prefer distance only and readers. Consider MF or modified MF in the future.   -Right eye still a bit tender after surgery. Insertion slightly more difficult after surgery due to sensitivity but they are managing.     Order new pair and mail direct. Follow-up 2 months here, sooner prn    I have confirmed the patient's CC, HPI and reviewed Past Medical History, Past Surgical History, Social History, Family History, Problem List, Medication List and agree with Tech note.     Aisha Juarez, ADILENE ROBINS

## 2024-02-19 NOTE — PROCEDURES
Radiotherapy Treatment Summary          Date of Report:  2021             PATIENT: CONNER MCKEON  MEDICAL RECORD NO: [id number]    : 1960     DIAGNOSIS: C91.01 Acute lymphoblastic leukemia, in remission     INTENT OF RADIOTHERAPY:  Part of conditioning regimen for stem cell transplantation        Details of the treatments summarized below are found in records kept in the Department of Radiation Oncology at [facility].     Treatment Summary:  Radiation Oncology - Course: 1 Protocol:   Treatment Site     Dose        Modality From To Elapsed Days Fx.  1 TBI         200 cGy 18 X  8/09/2021  2021   1             Dose per Fraction: 200 cGy   Total Dose: 200 cGy                  COMMENTS:   Patient tolerated total body radiation without any acute toxicity.     PAIN MANAGEMENT:   Patient did not require any pain management related to total body radiation.  Pain otherwise managed   by inpatient team.     FOLLOW UP PLAN: Total Body Irradiation was well tolerated.  After discharge from the hospital   the patient will be followed in the [facility].        Nurse Practitioner    Staff Physician  CONCHITA Richter M.D.     Physicist   Wojciech Paz, PhD                                       Radiation Oncology:  [facility] [address] [place] [address]                     DATE OF SERVICE: 02-19-24 @ 14:39    CHIEF COMPLAINT:Patient is a 77y old  Male who presents with a chief complaint of Chest Pain x 3 months (19 Feb 2024 13:21)      HISTORY OF PRESENT ILLNESS:HPI:  77M with PMHx of HTN, HLD, DMT2, ESRD on HD (M-W-F) via left AV fistula, CAD, s/p CABG in 1996 & PCI in 2017, A Flutter s/p ablation, on Eliquis, presents with intermittent exertional chest pain x 3 months. He states he has been feeling chest discomfort since the ablation, exertional, lasting for a few minutes and resolving when he rests.    Associated shortness of breath.   No nausea/ vomiting/ abdominal pain.      (18 Feb 2024 17:02)      PAST MEDICAL & SURGICAL HISTORY:  Hypertension      CAD (coronary artery disease)  S/P stent      Diabetes mellitus      HTN (hypertension)      Acute on chronic systolic congestive heart failure      Atrial fibrillation  on eliquis      HLD (hyperlipidemia)      Hyperlipidemia      ESRD on dialysis      S/P CABG (coronary artery bypass graft)      S/P coronary artery stent placement      S/P CABG (coronary artery bypass graft)      H/O cardiac catheterization              MEDICATIONS:  apixaban 2.5 milliGRAM(s) Oral two times a day  aspirin  chewable 81 milliGRAM(s) Oral daily  carvedilol 25 milliGRAM(s) Oral every 12 hours  hydrALAZINE 25 milliGRAM(s) Oral three times a day            atorvastatin 20 milliGRAM(s) Oral at bedtime  dextrose 50% Injectable 12.5 Gram(s) IV Push once  dextrose 50% Injectable 25 Gram(s) IV Push once  dextrose 50% Injectable 25 Gram(s) IV Push once  dextrose Oral Gel 15 Gram(s) Oral once PRN  glucagon  Injectable 1 milliGRAM(s) IntraMuscular once  insulin lispro (ADMELOG) corrective regimen sliding scale   SubCutaneous at bedtime  insulin lispro (ADMELOG) corrective regimen sliding scale   SubCutaneous three times a day before meals    dextrose 5%. 1000 milliLiter(s) IV Continuous <Continuous>  dextrose 5%. 1000 milliLiter(s) IV Continuous <Continuous>      FAMILY HISTORY:  FH: diabetes mellitus (Father)        Non-contributory    SOCIAL HISTORY:    [X] Tobacco  [X] Drugs  [X] Alcohol    Allergies    No Known Allergies    Intolerances    	    REVIEW OF SYSTEMS:  CONSTITUTIONAL: No fever  EYES: No eye pain, visual disturbances, or discharge  ENMT:  No difficulty hearing, tinnitus  NECK: No pain or stiffness  RESPIRATORY: No cough, wheezing,  CARDIOVASCULAR: No chest pain, palpitations, passing out, dizziness, or leg swelling  GASTROINTESTINAL:  No nausea, vomiting, diarrhea or constipation. No melena.  GENITOURINARY: No dysuria, hematuria  NEUROLOGICAL: No stroke like symptoms  SKIN: No burning or lesions   ENDOCRINE: No heat or cold intolerance  MUSCULOSKELETAL: No joint pain or swelling  PSYCHIATRIC: No  anxiety, mood swings  HEME/LYMPH: No bleeding gums  ALLERGY AND IMMUNOLOGIC: No hives or eczema	    All other ROS negative    PHYSICAL EXAM:  T(C): 36.3 (02-19-24 @ 12:31), Max: 37.1 (02-19-24 @ 05:38)  HR: 68 (02-19-24 @ 12:31) (64 - 76)  BP: 174/66 (02-19-24 @ 12:31) (105/65 - 174/66)  RR: 18 (02-19-24 @ 12:31) (18 - 18)  SpO2: 99% (02-19-24 @ 12:31) (94% - 99%)  Wt(kg): --  I&O's Summary      Appearance: Normal	  HEENT:   Normal oral mucosa, EOMI	  Cardiovascular:  S1 S2, No JVD,    Respiratory: Lungs clear to auscultation	  Psychiatry: Alert  Gastrointestinal:  Soft, Non-tender, + BS	  Skin: No rashes   Neurologic: Non-focal  Extremities:  No edema  Vascular: Peripheral pulses palpable    	    	  	  CARDIAC MARKERS:  Labs personally reviewed by me                                  10.9   7.45  )-----------( 59       ( 18 Feb 2024 11:45 )             35.0     02-19    134<L>  |  101  |  64<H>  ----------------------------<  227<H>  5.3   |  28  |  7.49<H>    Ca    10.0      19 Feb 2024 13:05  Phos  5.1     02-19  Mg     2.4     02-19    TPro  7.5  /  Alb  4.1  /  TBili  0.2  /  DBili  x   /  AST  14  /  ALT  14  /  AlkPhos  119  02-18          EKG: Personally reviewed by me - Sr HR 80  Radiology: Personally reviewed by me -  Xray Chest 1 View- PORTABLE-Urgent  Median sternotomy and mediastinal clips. No change heart mediastinum. No   consolidation pneumothorax or effusion. Left  axillary graft noted.    IMPRESSION: No acute findings      Assessment /Plan:   77M with PMHx of HTN, HLD, DMT2, ESRD on HD (M-W-F) via left AV fistula, CAD, s/p CABG in 1996 & PCI in 2017, A Flutter s/p ablation, on Eliquis, presents with intermittent exertional chest pain x 3 months.    1. Problem/Plan  Problem: Chest pain  - Appears atypical, reports intermittent exertional chest pain for 3 months   - Trop elevated 88-->88  - EKG revealing SR  w/ ST & T WAVE ABNORMALITY   - Given hx of CAD and PCIs, will plan for Nuclear stress test      2. Problem/Plan:  Problem: Coronary artery disease  - CABG in 1996  - PCI in 2017   - 4/17 Stress test + for multiple, partially reversible large, mild-moderate defects suggestive of infarct with moderate tashi-infarct ischemia        3. Problem/Plan:  Problem: A flutter  - AF  s/p Ablation   - Continue Eliquis 2.5mg PO BID     4. Problem/Plan:  Problem: Hypertension   - Continue Hydralazine 25mg TID  - Continue Coreg 25mg BID    5. Problem/Plan:  Problem: HLD  - Continue Atorvastatin 20mg PO daily            Differential diagnosis and plan of care discussed with patient after the evaluation. Counseling on diet, nutritional counseling, weight management, exercise and medication compliance was done.   Advanced care planning/advanced directives discussed with patient/family. DNR status including forceful chest compressions to attempt to restart the heart, ventilator support/artificial breathing, electric shock, artificial nutrition, health care proxy, Molst form all discussed with pt. Pt wishes to consider. More than fifteen minutes spent on discussing advanced directives.     MARGARET Jean DO [facility]  Cardiovascular Medicine  [address], [address]  Available for call or text via [employer]  Office [phone]   DATE OF SERVICE: 02-19-24 @ 14:39    CHIEF COMPLAINT:Patient is a 77y old  Male who presents with a chief complaint of Chest Pain x 3 months (19 Feb 2024 13:21)      HISTORY OF PRESENT ILLNESS:HPI:  77M with PMHx of HTN, HLD, DMT2, ESRD on HD (M-W-F) via left AV fistula, CAD, s/p CABG in 1996 & PCI in 2017, A Flutter s/p ablation, on Eliquis, presents with intermittent exertional chest pain x 3 months. He states he has been feeling chest discomfort since the ablation, exertional, lasting for a few minutes and resolving when he rests.    Associated shortness of breath.   No nausea/ vomiting/ abdominal pain.      (18 Feb 2024 17:02)      PAST MEDICAL & SURGICAL HISTORY:  Hypertension      CAD (coronary artery disease)  S/P stent      Diabetes mellitus      HTN (hypertension)      Acute on chronic systolic congestive heart failure      Atrial fibrillation  on eliquis      HLD (hyperlipidemia)      Hyperlipidemia      ESRD on dialysis      S/P CABG (coronary artery bypass graft)      S/P coronary artery stent placement      S/P CABG (coronary artery bypass graft)      H/O cardiac catheterization              MEDICATIONS:  apixaban 2.5 milliGRAM(s) Oral two times a day  aspirin  chewable 81 milliGRAM(s) Oral daily  carvedilol 25 milliGRAM(s) Oral every 12 hours  hydrALAZINE 25 milliGRAM(s) Oral three times a day            atorvastatin 20 milliGRAM(s) Oral at bedtime  dextrose 50% Injectable 12.5 Gram(s) IV Push once  dextrose 50% Injectable 25 Gram(s) IV Push once  dextrose 50% Injectable 25 Gram(s) IV Push once  dextrose Oral Gel 15 Gram(s) Oral once PRN  glucagon  Injectable 1 milliGRAM(s) IntraMuscular once  insulin lispro (ADMELOG) corrective regimen sliding scale   SubCutaneous at bedtime  insulin lispro (ADMELOG) corrective regimen sliding scale   SubCutaneous three times a day before meals    dextrose 5%. 1000 milliLiter(s) IV Continuous <Continuous>  dextrose 5%. 1000 milliLiter(s) IV Continuous <Continuous>      FAMILY HISTORY:  FH: diabetes mellitus (Father)        Non-contributory    SOCIAL HISTORY:    [X] Tobacco  [X] Drugs  [X] Alcohol    Allergies    No Known Allergies    Intolerances    	    REVIEW OF SYSTEMS:  CONSTITUTIONAL: No fever  EYES: No eye pain, visual disturbances, or discharge  ENMT:  No difficulty hearing, tinnitus  NECK: No pain or stiffness  RESPIRATORY: No cough, wheezing,  CARDIOVASCULAR: No chest pain, palpitations, passing out, dizziness, or leg swelling  GASTROINTESTINAL:  No nausea, vomiting, diarrhea or constipation. No melena.  GENITOURINARY: No dysuria, hematuria  NEUROLOGICAL: No stroke like symptoms  SKIN: No burning or lesions   ENDOCRINE: No heat or cold intolerance  MUSCULOSKELETAL: No joint pain or swelling  PSYCHIATRIC: No  anxiety, mood swings  HEME/LYMPH: No bleeding gums  ALLERGY AND IMMUNOLOGIC: No hives or eczema	    All other ROS negative    PHYSICAL EXAM:  T(C): 36.3 (02-19-24 @ 12:31), Max: 37.1 (02-19-24 @ 05:38)  HR: 68 (02-19-24 @ 12:31) (64 - 76)  BP: 174/66 (02-19-24 @ 12:31) (105/65 - 174/66)  RR: 18 (02-19-24 @ 12:31) (18 - 18)  SpO2: 99% (02-19-24 @ 12:31) (94% - 99%)  Wt(kg): --  I&O's Summary      Appearance: Normal	  HEENT:   Normal oral mucosa, EOMI	  Cardiovascular:  S1 S2, No JVD,    Respiratory: Lungs clear to auscultation	  Psychiatry: Alert  Gastrointestinal:  Soft, Non-tender, + BS	  Skin: No rashes   Neurologic: Non-focal  Extremities:  No edema  Vascular: Peripheral pulses palpable    	    	  	  CARDIAC MARKERS:  Labs personally reviewed by me                                  10.9   7.45  )-----------( 59       ( 18 Feb 2024 11:45 )             35.0     02-19    134<L>  |  101  |  64<H>  ----------------------------<  227<H>  5.3   |  28  |  7.49<H>    Ca    10.0      19 Feb 2024 13:05  Phos  5.1     02-19  Mg     2.4     02-19    TPro  7.5  /  Alb  4.1  /  TBili  0.2  /  DBili  x   /  AST  14  /  ALT  14  /  AlkPhos  119  02-18          EKG: Personally reviewed by me - Sr HR 80  Radiology: Personally reviewed by me -  Xray Chest 1 View- PORTABLE-Urgent  Median sternotomy and mediastinal clips. No change heart mediastinum. No   consolidation pneumothorax or effusion. Left  axillary graft noted.    IMPRESSION: No acute findings      Assessment /Plan:   77M with PMHx of HTN, HLD, DMT2, ESRD on HD (M-W-F) via left AV fistula, CAD, s/p CABG in 1996 & PCI in 2017, A Flutter s/p ablation, on Eliquis, presents with intermittent exertional chest pain x 3 months.    1. Problem/Plan  Problem: NSTEMI  -Reports intermittent exertional chest pain for 3 months  - Trop elevated 88-->88  - EKG revealing SR  w/ ST & T WAVE ABNORMALITY   - Given exertional angina,  hx of CAD and PCIs, elevated trop will plan for cardiac cath, r/b of cath discussed with pt, agreeable to proceed on Tuesday      2. Problem/Plan:  Problem: Coronary artery disease  - CABG in 1996  - PCI in 2017   - Cath 4/2023 with patent LIMA to LAD, with mLCx 90% disease, patent SVG to LPL    3. Problem/Plan:  Problem: A flutter  - AF  s/p Ablation   - Continue Eliquis 2.5mg PO BID (Hold for cath)    4. Problem/Plan:  Problem: Hypertension   - Continue Hydralazine 25mg TID  - Continue Coreg 25mg BID    5. Problem/Plan:  Problem: HLD  - Continue Atorvastatin 20mg PO daily            Differential diagnosis and plan of care discussed with patient after the evaluation. Counseling on diet, nutritional counseling, weight management, exercise and medication compliance was done.   Advanced care planning/advanced directives discussed with patient/family. DNR status including forceful chest compressions to attempt to restart the heart, ventilator support/artificial breathing, electric shock, artificial nutrition, health care proxy, Molst form all discussed with pt. Pt wishes to consider. More than fifteen minutes spent on discussing advanced directives.     MARGARET Jean DO [facility]  Cardiovascular Medicine  [address] [place] [address] Dr, [address]  Available for call or text via [employer]  Office [phone]

## 2024-02-20 ENCOUNTER — OFFICE VISIT (OUTPATIENT)
Dept: NEPHROLOGY | Facility: CLINIC | Age: 64
End: 2024-02-20
Attending: INTERNAL MEDICINE
Payer: MEDICARE

## 2024-02-20 ENCOUNTER — LAB (OUTPATIENT)
Dept: LAB | Facility: CLINIC | Age: 64
End: 2024-02-20
Attending: INTERNAL MEDICINE
Payer: MEDICARE

## 2024-02-20 VITALS
DIASTOLIC BLOOD PRESSURE: 86 MMHG | OXYGEN SATURATION: 98 % | BODY MASS INDEX: 32.46 KG/M2 | WEIGHT: 246 LBS | TEMPERATURE: 98.1 F | HEART RATE: 89 BPM | SYSTOLIC BLOOD PRESSURE: 132 MMHG

## 2024-02-20 DIAGNOSIS — D89.813 GVHD AS COMPLICATION OF BONE MARROW TRANSPLANT (H): ICD-10-CM

## 2024-02-20 DIAGNOSIS — N18.2 CKD (CHRONIC KIDNEY DISEASE) STAGE 2, GFR 60-89 ML/MIN: ICD-10-CM

## 2024-02-20 DIAGNOSIS — N17.9 AKI (ACUTE KIDNEY INJURY) (H): ICD-10-CM

## 2024-02-20 DIAGNOSIS — I12.9 BENIGN HYPERTENSION WITH CKD (CHRONIC KIDNEY DISEASE), STAGE II: Primary | ICD-10-CM

## 2024-02-20 DIAGNOSIS — E89.0 POSTABLATIVE HYPOTHYROIDISM: ICD-10-CM

## 2024-02-20 DIAGNOSIS — T86.09 GVHD AS COMPLICATION OF BONE MARROW TRANSPLANT (H): ICD-10-CM

## 2024-02-20 DIAGNOSIS — N18.2 BENIGN HYPERTENSION WITH CKD (CHRONIC KIDNEY DISEASE), STAGE II: Primary | ICD-10-CM

## 2024-02-20 LAB
ALBUMIN MFR UR ELPH: 15.7 MG/DL
ALBUMIN SERPL BCG-MCNC: 4.3 G/DL (ref 3.5–5.2)
ALBUMIN UR-MCNC: NEGATIVE MG/DL
ANION GAP SERPL CALCULATED.3IONS-SCNC: 12 MMOL/L (ref 7–15)
APPEARANCE UR: CLEAR
BASOPHILS # BLD AUTO: 0 10E3/UL (ref 0–0.2)
BASOPHILS NFR BLD AUTO: 0 %
BILIRUB UR QL STRIP: NEGATIVE
BUN SERPL-MCNC: 40.6 MG/DL (ref 8–23)
CALCIUM SERPL-MCNC: 10.1 MG/DL (ref 8.8–10.2)
CHLORIDE SERPL-SCNC: 96 MMOL/L (ref 98–107)
COLOR UR AUTO: YELLOW
CREAT SERPL-MCNC: 1.06 MG/DL (ref 0.67–1.17)
CREAT UR-MCNC: 58.5 MG/DL
DEPRECATED HCO3 PLAS-SCNC: 25 MMOL/L (ref 22–29)
EGFRCR SERPLBLD CKD-EPI 2021: 78 ML/MIN/1.73M2
EOSINOPHIL # BLD AUTO: 0 10E3/UL (ref 0–0.7)
EOSINOPHIL NFR BLD AUTO: 0 %
ERYTHROCYTE [DISTWIDTH] IN BLOOD BY AUTOMATED COUNT: 15.8 % (ref 10–15)
GLUCOSE SERPL-MCNC: 105 MG/DL (ref 70–99)
GLUCOSE UR STRIP-MCNC: NEGATIVE MG/DL
HCT VFR BLD AUTO: 35.2 % (ref 40–53)
HGB BLD-MCNC: 12.6 G/DL (ref 13.3–17.7)
HGB UR QL STRIP: NEGATIVE
HYALINE CASTS: 2 /LPF
IMM GRANULOCYTES # BLD: 0.2 10E3/UL
IMM GRANULOCYTES NFR BLD: 2 %
KETONES UR STRIP-MCNC: NEGATIVE MG/DL
LEUKOCYTE ESTERASE UR QL STRIP: NEGATIVE
LYMPHOCYTES # BLD AUTO: 3.5 10E3/UL (ref 0.8–5.3)
LYMPHOCYTES NFR BLD AUTO: 30 %
MCH RBC QN AUTO: 35.5 PG (ref 26.5–33)
MCHC RBC AUTO-ENTMCNC: 35.8 G/DL (ref 31.5–36.5)
MCV RBC AUTO: 99 FL (ref 78–100)
MONOCYTES # BLD AUTO: 0.6 10E3/UL (ref 0–1.3)
MONOCYTES NFR BLD AUTO: 5 %
MUCOUS THREADS #/AREA URNS LPF: PRESENT /LPF
NEUTROPHILS # BLD AUTO: 7.4 10E3/UL (ref 1.6–8.3)
NEUTROPHILS NFR BLD AUTO: 63 %
NITRATE UR QL: NEGATIVE
NRBC # BLD AUTO: 0.1 10E3/UL
NRBC BLD AUTO-RTO: 1 /100
PH UR STRIP: 6.5 [PH] (ref 5–7)
PHOSPHATE SERPL-MCNC: 3.4 MG/DL (ref 2.5–4.5)
PLATELET # BLD AUTO: 182 10E3/UL (ref 150–450)
POTASSIUM SERPL-SCNC: 4.6 MMOL/L (ref 3.4–5.3)
PROT/CREAT 24H UR: 0.27 MG/MG CR (ref 0–0.2)
RBC # BLD AUTO: 3.55 10E6/UL (ref 4.4–5.9)
RBC URINE: <1 /HPF
SODIUM SERPL-SCNC: 133 MMOL/L (ref 135–145)
SP GR UR STRIP: 1.01 (ref 1–1.03)
T4 FREE SERPL-MCNC: 1.59 NG/DL (ref 0.9–1.7)
TSH SERPL DL<=0.005 MIU/L-ACNC: 0.33 UIU/ML (ref 0.3–4.2)
UROBILINOGEN UR STRIP-MCNC: NORMAL MG/DL
WBC # BLD AUTO: 11.7 10E3/UL (ref 4–11)
WBC URINE: <1 /HPF

## 2024-02-20 PROCEDURE — 84156 ASSAY OF PROTEIN URINE: CPT

## 2024-02-20 PROCEDURE — 99215 OFFICE O/P EST HI 40 MIN: CPT | Mod: 24 | Performed by: INTERNAL MEDICINE

## 2024-02-20 PROCEDURE — 80069 RENAL FUNCTION PANEL: CPT

## 2024-02-20 PROCEDURE — 85004 AUTOMATED DIFF WBC COUNT: CPT

## 2024-02-20 PROCEDURE — 84443 ASSAY THYROID STIM HORMONE: CPT

## 2024-02-20 PROCEDURE — 81001 URINALYSIS AUTO W/SCOPE: CPT

## 2024-02-20 PROCEDURE — 250N000011 HC RX IP 250 OP 636: Performed by: INTERNAL MEDICINE

## 2024-02-20 PROCEDURE — G0463 HOSPITAL OUTPT CLINIC VISIT: HCPCS | Performed by: INTERNAL MEDICINE

## 2024-02-20 PROCEDURE — 36591 DRAW BLOOD OFF VENOUS DEVICE: CPT

## 2024-02-20 PROCEDURE — 84439 ASSAY OF FREE THYROXINE: CPT

## 2024-02-20 RX ORDER — DOXYCYCLINE 100 MG/1
100 CAPSULE ORAL
COMMUNITY
Start: 2024-02-09 | End: 2024-02-23

## 2024-02-20 RX ORDER — HEPARIN SODIUM (PORCINE) LOCK FLUSH IV SOLN 100 UNIT/ML 100 UNIT/ML
500 SOLUTION INTRAVENOUS ONCE
Status: COMPLETED | OUTPATIENT
Start: 2024-02-20 | End: 2024-02-20

## 2024-02-20 RX ADMIN — Medication 500 UNITS: at 15:10

## 2024-02-20 ASSESSMENT — PAIN SCALES - GENERAL: PAINLEVEL: NO PAIN (0)

## 2024-02-20 NOTE — PATIENT INSTRUCTIONS
Keep monitoring sodium and if they get worse please let me know  Drink to thirst  See you in 5 months with labs

## 2024-02-20 NOTE — NURSING NOTE
Chief Complaint   Patient presents with    RECHECK     3 mo f/u     /86   Pulse 89   Temp 98.1  F (36.7  C) (Oral)   Wt 111.6 kg (246 lb)   SpO2 98%   BMI 32.46 kg/m      Regan Smith on 2/20/2024 at 3:31 PM

## 2024-02-20 NOTE — LETTER
2/20/2024       RE: Nik Nava  [id number] [facility] [id number]     Dear Colleague,    Thank you for referring your patient, Nik Nava, to the [facility] [place] at [facility]. Please see a copy of my visit note below.      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      Oncologic history: The patient was diagnosed with Ph+ ALL in 2020 after presented with feeling unwell and found to have leukocytosis and blast in his blood. He was treated with Dex and Dasatinib then 2 cycles of vincristine, prednisone, daunorubicin, and pegasparaginase with intrathecal methotrexate. Last ly, he received 1 course of high dose Jaymie-C. He achieved CR with no MRD. Subsequently, he underwent allo sib NMA (flu/cy+TBI) PBSCT on  8/10/21. hematocrit-CI was 3.  The patient was noted to be in CR with BmBx at D100, D180 and 1Y showing 100% donor. He has not been started on TKI due to previous multiple active issues. Post Tx complication included cGVHD at skin, eyes, o liver (all Bx proven, except eyes, clinically) started since 2/22. The current active cGVHDD involved eyes and skin. He was on Tacrolimus for cGVHD but later discontinued due to NEGRA, hyperkalemia hypomagnesemia, tremors and cramps in 9/22. Sirolimus was started in 9/21/22 in replacement of Tac. Cr improved, but the presented on 10/11/22 with ocular and cGVHD flare, fluid retention and gain 5-6 kg. BMT thought this is sirolimus Tox? (of note UPCR was only 0.4 and normal SAlb). Sirolimus was then stopped. Prednisone was started at 40 mg with slow tapering. He was also started on Belumosudil on 10/18/22 (ROCK2  inhibitors:Rho-associated coiled-coil kinase )  Other pertinent Hx:   - He had PE in the setting of receiving PEG asparaginase and was treated with Xarelto which is now completed.   - He had hyperferritinemia which now being followed closely. Not on iron chelators.  - He had COVID -19 in 5/22.  - CMV viremia  - Grave disease  - HTN  Kidney history: He had RCC s/p Rt nephrectomy in 2007.  The patient has baseline creatinine of 0.9-1 pretransplant.  He developed NEGRA in 9/21 with Cr peaked at 1.89 but then come down to baseline. He had NEGRA again in 9/22 with Cr peaked at 1.8, suspect that due to tacrolimus toxicity and it was stopped. Cr improved but now still fluctuates between 1.1-1.3.  The most recent creatinine was on 10/31/2022 at 1.08.  Serum albumin is 3.3. UCPR is 0.39 g/g on 10/18/22. UA on 9/12/21 and 10/18/22 showed no protein, no blood.  However UA on 9/12/2021 showed 8 hyaline cast.      11/1/22: He is doing well except that he still has LE edema. It has improved from when he was on Sirolimus but still quite a bit. Edema has actually started since 2021 but unclear when.But it is certainly getting worse lately when he was treated with high dose steroid and sirolimus.His cGVHD is o/eva all stable but not completely gone. He was just started Belumosudil on 10/18/22 for cGVHD. He just saw BMT today and they are decreasing his steroid.Amlodipine started since 8/21 and possibly related to his swelling. He has multiple SEs from steroid such as weight gain , central obesity, easily bruised, increased appetite and partial insomnia. He has some nocturia. He has no dysuria or hematuria. Recently, he had cat scrath incidence and being treated with doxycycline. He has abdominal distension and gained weight progressively. He drinks 120 Oz of tea per day. We started chlorthalidone and reduced amlodipine.   12/1-12/3/22: Was admitted for dyspnea and malaise concerning for belumosudil intolerance. However, at the same time  he also had flu A positive. He was taken off of amlodipine on 12/6/22. And subsequently chlorthalidone was off in 12/13/22 when Na was 128. Na improved afterwards.   1/10/23:  He feels good today. His BP are now very good 110-130/70 mmHg. He is not on any BP medication ayt this time. He is taking Belumosidil. Symptoms of cGVHD have been improving.  He has some LE edema and easily bruised likely from steroid/belumosudil.   Labs: Creatinine 1.11, potassium 4.8,, dioxide 28, phosphorus 2.3, albumin 4.2, calcium 10.1.  Hemoglobin 11.3, platelet 140. UA pending.   6/13/23: In the interim, he went to the ED on 5/14/23 for testicular pain. CT showed no hernia or mass. Hew was also diagnosed with spine osteomyelitis and Epidural abscess and being treated with Ceftraixone for 6 weeks. Noted stable cGVHD. He also had COVID in 1/23. Now on physiologica dose of steroid. Still has dry eyes and a bit of mouth sore.  He drinks about 3-4 glasses of milk per day. He still has back pain that radiating downt to right leg. He completed ceftriaxone in 5/25/23. Appetite is stable as well as his weight. He has minimal swelling. He is on prednisone 4 mg alternate with 10 mg per day. Labs 6/13/23: Cr 1.10, BUN 46.2, K 5.1, Calcium 10.3, Phos 3.1, Albumin 4.0. Hb 11.7, Platelet 182, WBC 10.1.   10/16/23: In interim, he was diagnosed with L5-S1 discitis osteomyelitis at [facility] after presented with ongoing back pain.  MRI showing findings consistent with ongoing discitis-osteomyelitis at L5-S1 which has progressed since last imaging in June. He was seen by ID and neurosurgery. He underwent disc aspiration.  ID is recommending another 6 weeks course of antibiotics with a different regimen: Invanz and vancomycin. Neurosurgery did not recommend any surgical intervention at this time. Vanco dose was reduced on 10/10/23 due to kidney function and stopped last Thursday when Cr was 1.87. Lately, his blood pressure has been increasing to  150-160 and sometimes 180. I asked him to resume amlodipine 5 mg.  After stopping vancomycin, BP has improved along with his appetite. Cr has improved  to 1.68 on 10/16/23 ([facility] labs). He has gained some weight. His swelling was worse when he was admitted in the hospital. Now he is on Ertapenenm and clindamycin oral until 10/23. Pain is mostly when he lays too long and going to get up. He is still using fentanyl patch but the dose was reduced. Belumosidil was just on hold.  Labs on 10/10/2023 showed creatinine 1.56 but Cr 1.678 on 10/16/23 ([facility] lab), BUN 26, GFR 49, potassium 3.5,, dioxide 28, calcium 10.2, albumin 4.1, alkaline phosphatase 141.  CBC show white blood cell 13, hemoglobin 10.9, platelet 213. Vancomycin at [facility] was 21.4 on 10/9/23. CRP 2.2 and ESR 46. UA on 10/12/23 showed no blood but UPCR is 0.2 g/g.   11/14/23: He has been on Augmentin and Doxycyline plan until end of this month . Clindamycin upsets his currently due to main complaint is low energy and restless leg syndrome that started about 2 weeks ago.  He recently stopped taking magnesium and iron a month ago.  Stomach. He has minor swelling. He is off of fentanyl 2 weeks ago. BP has been in the 120-130/80s. His Cr has improved and from [facility] lab, Cr 1.26 on 11/6/23. He has been eating and drinking well. He is on prednisone. He started to have restless leg.  Labs today show WBC 11.5, Hb 11.6.  Creatinine 1.4, EGFR 56, potassium 4.8, bicarb 24.  calcium 10.1 albumin 4.4.  CRP less than 3, sed rate 27.  UA showed protein 50 mg/dL.  Platelets of 2, RBC less than 1.UPCR pending.   2/20/24: He has relapsed of oral GVHD and prednisone was increased to 40 mg/d and now on tapering dose. He is also taking valtrex which has helped with HSV. In the meantime, his BP were in the 140-150 so chlorthalidone 12.5 mg was added at the end of January 2024. He also was admitted at the end of January for fever, chest pain but all work-up were unrevealing. He  was treated him with Doxycycline for 14 days and now he feels better. Labs today showed Na 133, K 4.6, Bicarb 25, Cr 1.06 and BUN 40.6. CBC showed WBC 11.7, Hb 12.6. UA is bland. UPCR 0.27 g/g.     Past medical history  Past Medical History:   Diagnosis Date    ALL (acute lymphocytic leukemia) (H)     CKD (chronic kidney disease)     GVHD (graft versus host disease) (H)     H/O peripheral stem cell transplant (H)     Hyperthyroidism 1996    Infection due to 2019 novel coronavirus 01/16/2023    Influenza A 11/2022    Postablative hypothyroidism 1997    Pulmonary embolism (H)     Renal cell carcinoma (H) 2007    right kidney    Sleep apnea        Past surgical history  Past Surgical History:   Procedure Laterality Date    APPENDECTOMY      BACK SURGERY  2017    BONE MARROW BIOPSY, BONE SPECIMEN, NEEDLE/TROCAR Left 09/02/2021    Procedure: BIOPSY, BONE MARROW;  Surgeon: Jailyn Ny APRN CNP;  Location: [facility] OR    BONE MARROW BIOPSY, BONE SPECIMEN, NEEDLE/TROCAR Left 11/15/2021    Procedure: BIOPSY, BONE MARROW;  Surgeon: Socrates De La Torre;  Location: [facility] OR    BONE MARROW BIOPSY, BONE SPECIMEN, NEEDLE/TROCAR Left 02/07/2022    Procedure: BIOPSY, BONE MARROW;  Surgeon: Renetta Wiggins PA-C;  Location: [facility] OR    BONE MARROW BIOPSY, BONE SPECIMEN, NEEDLE/TROCAR Left 08/18/2022    Procedure: BIOPSY, BONE MARROW;  Surgeon: Lorrie Yap PA-C;  Location: [facility] OR    BONE MARROW BIOPSY, BONE SPECIMEN, NEEDLE/TROCAR Left 08/07/2023    Procedure: Bone marrow biopsy, bone specimen, needle/trocar;  Surgeon: Teressa Rick PA-C;  Location: [facility] OR    BRONCHOSCOPY (RIGID OR FLEXIBLE), DIAGNOSTIC N/A 04/29/2022    Procedure: BRONCHOSCOPY, WITH BRONCHOALVEOLAR LAVAGE;  Surgeon: Murtaza Garcia MD;  Location: [facility]    IR CVC TUNNEL PLACEMENT > 5 YRS OF AGE  08/04/2021    IR CVC TUNNEL REMOVAL LEFT  09/02/2021    IR LIVER BIOPSY PERCUTANEOUS  02/21/2022    NEPHRECTOMY  2007    ORTHOPEDIC SURGERY       bilateral shoulder surgeries    PERCUTANEOUS BIOPSY LIVER N/A 02/21/2022    Procedure: NEEDLE BIOPSY, LIVER, PERCUTANEOUS;  Surgeon: Trace Castellanos MD;  Location: [facility] OR    PHACOEMULSIFICATION WITH STANDARD INTRAOCULAR LENS IMPLANT Left 1/15/2024    Procedure: LEFT EYE PHACOEMULSIFICATION, CATARACT, WITH STANDARD INTRAOCULAR LENS IMPLANT INSERTION;  Surgeon: Deshawn Menezes MD;  Location: [facility] OR    PHACOEMULSIFICATION WITH STANDARD INTRAOCULAR LENS IMPLANT Right 2/5/2024    Procedure: RIGHT EYE PHACOEMULSIFICATION, CATARACT, WITH STANDARD INTRAOCULAR LENS IMPLANT INSERTION;  Surgeon: Deshawn Menezes MD;  Location: [facility] OR       Review of Systems:   14 systems were reviewed and all negative except as mentioned above.   Current Medications:  Current Outpatient Medications   Medication    doxycycline monohydrate (MONODOX) 100 MG capsule    acetaminophen (TYLENOL) 500 MG tablet    acetylcysteine 10 % (MUCOMYST) 10% SOLN    acyclovir (ZOVIRAX) 400 MG tablet    amLODIPine (NORVASC) 5 MG tablet    Carboxymethylcell-Glycerin PF (REFRESH RELIEVA PF) 0.5-1 % SOLN    chlorthalidone (HYGROTON) 25 MG tablet    dexAMETHasone alcohol-free (DECADRON) 0.1 MG/ML solution    escitalopram (LEXAPRO) 10 MG tablet    fluorometholone (FML LIQUIFILM) 0.1 % ophthalmic suspension    hydrOXYzine (VISTARIL) 25 MG capsule    levothyroxine (SYNTHROID/LEVOTHROID) 125 MCG tablet    LORazepam (ATIVAN) 1 MG tablet    moxifloxacin (VIGAMOX) 0.5 % ophthalmic solution    moxifloxacin (VIGAMOX) 0.5 % ophthalmic solution    Multiple Vitamins-Minerals (MENS MULTIVITAMIN) TABS    nicotine polacrilex (NICORETTE) 4 MG gum    Nutritional Supplements (ENSURE COMPLETE SHAKE) LIQD    omeprazole (PRILOSEC) 40 MG DR capsule    oxyCODONE (ROXICODONE) 5 MG tablet    penicillin V (VEETID) 250 MG tablet    polyethylene glycol (MIRALAX) 17 GM/Dose powder    prednisoLONE acetate (PRED FORTE) 1 % ophthalmic suspension    prednisoLONE acetate (PRED  FORTE) 1 % ophthalmic suspension    predniSONE (DELTASONE) 1 MG tablet    predniSONE (DELTASONE) 10 MG tablet    predniSONE (DELTASONE) 20 MG tablet    predniSONE (DELTASONE) 5 MG tablet    rosuvastatin (CRESTOR) 5 MG tablet    sennosides (SENOKOT) 8.6 MG tablet    sodium chloride 0.9 % neb solution     No current facility-administered medications for this visit.       Physical Exam:   /86   Pulse 89   Temp 98.1  F (36.7  C) (Oral)   Wt 111.6 kg (246 lb)   SpO2 98%   BMI 32.46 kg/m     Body mass index is 32.46 kg/m .    GENERAL APPEARANCE: Alert, not in acute distress; pleasant. + Cushingoid appearance.   EYES:  Not pale conjunctiva, pupils equal  HENT: Mouth without ulcers or lesions  PULM: lungs clear to auscultation bilaterally, equal air movement, no clubbing  CV: regular rhythm, normal rate, no rub     -JVD no distended.      -edema: No edema.   GI: soft,  - tender, no distended, bowel sounds are present  INTEGUMENT: No rashes.   NEURO:  Non focal. No asterixis.     Labs:   All labs reviewed by me  Last Renal Panel:  Sodium   Date Value Ref Range Status   02/20/2024 133 (L) 135 - 145 mmol/L Final     Comment:     Reference intervals for this test were updated on 09/26/2023 to more accurately reflect our healthy population. There may be differences in the flagging of prior results with similar values performed with this method. Interpretation of those prior results can be made in the context of the updated reference intervals.    07/08/2021 139 133 - 144 mmol/L Final     Potassium   Date Value Ref Range Status   02/20/2024 4.6 3.4 - 5.3 mmol/L Final   11/15/2022 5.0 3.4 - 5.3 mmol/L Final   07/08/2021 3.9 3.4 - 5.3 mmol/L Final     Potassium POCT   Date Value Ref Range Status   12/01/2022 4.8 3.4 - 5.3 mmol/L Final     Chloride   Date Value Ref Range Status   02/20/2024 96 (L) 98 - 107 mmol/L Final   11/15/2022 101 94 - 109 mmol/L Final   07/08/2021 108 94 - 109 mmol/L Final     Carbon Dioxide   Date  Value Ref Range Status   07/08/2021 23 20 - 32 mmol/L Final     Carbon Dioxide (CO2)   Date Value Ref Range Status   02/20/2024 25 22 - 29 mmol/L Final   11/15/2022 30 20 - 32 mmol/L Final     Anion Gap   Date Value Ref Range Status   02/20/2024 12 7 - 15 mmol/L Final   11/15/2022 3 3 - 14 mmol/L Final   07/08/2021 7 3 - 14 mmol/L Final     Glucose   Date Value Ref Range Status   02/20/2024 105 (H) 70 - 99 mg/dL Final   11/15/2022 113 (H) 70 - 99 mg/dL Final   07/08/2021 107 (H) 70 - 99 mg/dL Final     GLUCOSE BY METER POCT   Date Value Ref Range Status   12/03/2022 116 (H) 70 - 99 mg/dL Final     Urea Nitrogen   Date Value Ref Range Status   02/20/2024 40.6 (H) 8.0 - 23.0 mg/dL Final   11/15/2022 51 (H) 7 - 30 mg/dL Final   07/08/2021 24 7 - 30 mg/dL Final     Creatinine   Date Value Ref Range Status   02/20/2024 1.06 0.67 - 1.17 mg/dL Final   07/08/2021 0.94 0.66 - 1.25 mg/dL Final     GFR Estimate   Date Value Ref Range Status   02/20/2024 78 >60 mL/min/1.73m2 Final   07/08/2021 86 >60 mL/min/[1.73_m2] Final     Comment:     Non  GFR Calc  Starting 12/18/2018, serum creatinine based estimated GFR (eGFR) will be   calculated using the Chronic Kidney Disease Epidemiology Collaboration   (CKD-EPI) equation.       Calcium   Date Value Ref Range Status   02/20/2024 10.1 8.8 - 10.2 mg/dL Final   07/08/2021 8.9 8.5 - 10.1 mg/dL Final     Phosphorus   Date Value Ref Range Status   02/20/2024 3.4 2.5 - 4.5 mg/dL Final     Albumin   Date Value Ref Range Status   02/20/2024 4.3 3.5 - 5.2 g/dL Final   11/15/2022 3.6 3.4 - 5.0 g/dL Final   07/08/2021 3.7 3.4 - 5.0 g/dL Final       Imaging:  I reviewed imaging studies.     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 However, he had worsening swelling and increased Cr to 1.39. At that time, it was concerned that he may have sirolimus toxicity. But he only had UCPR 0.39 g/g at that time. However, Sirolimus was stopped. I started him on low dose chlorthalidone and reduced dose amlodipine and his swelling improved but he developed hypotension so all blood pressure medications were discontinued.  His creatinine then came down to be stable at 1.1 with a EGFR in the 70s.  However, he developed acute kidney injury stage I again likely secondary to vancomycin toxicity in October 2023. Cr peaked at 1.87 on 10/12/23 but later on improved and now down to 1.0-1.1.   - Stay well hydrated  - Low salt diet; NA 2000 mg per day  # L5/S1 discitis; rescurrent  Started on Ertapenem and Vanco since August 2023, plan for 6 weeks but Vanco stopped on 10/12/23 due to NEGRA  replaced with Clindamycin but then switched to Augmentin and doxycycline. Now completed Abx.  # Hypertension; well controlled  Blood pressure started to increase after he developed NEGRA in October 2023.  It was as high as 180s sometimes.  After he resumed amlodipine 5 mg, BP improved. However, BP worsened after escalation of prednisone dose in Dec 2023 so Cholrthalidone was added since 1/24/24 with improvement in swelling and BP. However, he has mild hypoNa today.  - Continue Amlodipine 5 mg daily and chlorthalidone 12.5 mg daily  - If he has persistent hyponatremia worsening hyponatremia, considering switching to torsemide 5 mg per day  - Eventually, I am leaning toward starting ACEi/ARB for proteinuria  # Mild hypercalcemia; resolved  Calcium was mildly elevated  at 10.3 last visit but today is 10.1. The patient is taking calcium vitamin D 1 tabs per day. Vit D is 65 on 6/13/23. PTH is 60 on 10/18/22.   # Mild hypoNa  Na 133.  Likely from chlorthalidone but will continue to watch for now if there is persistent or worsening then consider switching to torsemide as above.     Follow-up in 4 months with labs    I spent  35 minutes on the date of the encounter doing chart review, history and exam, documentation and further activities as noted above. 22 minutes of this visit is dedicated to direct patient interaction via face to face.     Keegan Chew MD on 02/20/2024

## 2024-02-20 NOTE — NURSING NOTE
"Chief Complaint   Patient presents with    Port Draw     Labs drawn via port by RN in lab.       Port accessed with 20 gauge 3/4\" gripper needle by RN, labs collected, line flushed with saline and heparin, then de-accessed.      Rajni Menezes RN    "

## 2024-02-20 NOTE — PROGRESS NOTES
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      Oncologic history: The patient was diagnosed with Ph+ ALL in 2020 after presented with feeling unwell and found to have leukocytosis and blast in his blood. He was treated with Dex and Dasatinib then 2 cycles of vincristine, prednisone, daunorubicin, and pegasparaginase with intrathecal methotrexate. Last ly, he received 1 course of high dose Jaymie-C. He achieved CR with no MRD. Subsequently, he underwent allo sib NMA (flu/cy+TBI) PBSCT on  8/10/21. hematocrit-CI was 3.  The patient was noted to be in CR with BmBx at D100, D180 and 1Y showing 100% donor. He has not been started on TKI due to previous multiple active issues. Post Tx complication included cGVHD at skin, eyes, o liver (all Bx proven, except eyes, clinically) started since 2/22. The current active cGVHDD involved eyes and skin. He was on Tacrolimus for cGVHD but later discontinued due to NEGRA, hyperkalemia hypomagnesemia, tremors and cramps in 9/22. Sirolimus was started in 9/21/22 in replacement of Tac. Cr improved, but the presented on 10/11/22 with ocular and cGVHD flare, fluid retention and gain 5-6 kg. BMT thought this is sirolimus Tox? (of note UPCR was only 0.4 and normal SAlb). Sirolimus was then stopped. Prednisone was started at 40 mg with slow tapering. He was also started on Belumosudil on 10/18/22 (ROCK2 inhibitors:Rho-associated coiled-coil kinase )  Other pertinent Hx:   - He had PE in the setting of receiving PEG asparaginase and was treated with Xarelto which is now completed.   - He had hyperferritinemia which now being followed closely. Not on iron chelators.  - He had COVID -19 in 5/22.  - CMV viremia  - Grave  disease  - HTN  Kidney history: He had RCC s/p Rt nephrectomy in 2007.  The patient has baseline creatinine of 0.9-1 pretransplant.  He developed NEGRA in 9/21 with Cr peaked at 1.89 but then come down to baseline. He had NEGRA again in 9/22 with Cr peaked at 1.8, suspect that due to tacrolimus toxicity and it was stopped. Cr improved but now still fluctuates between 1.1-1.3.  The most recent creatinine was on 10/31/2022 at 1.08.  Serum albumin is 3.3. UCPR is 0.39 g/g on 10/18/22. UA on 9/12/21 and 10/18/22 showed no protein, no blood.  However UA on 9/12/2021 showed 8 hyaline cast.      11/1/22: He is doing well except that he still has LE edema. It has improved from when he was on Sirolimus but still quite a bit. Edema has actually started since 2021 but unclear when.But it is certainly getting worse lately when he was treated with high dose steroid and sirolimus.His cGVHD is o/eva all stable but not completely gone. He was just started Belumosudil on 10/18/22 for cGVHD. He just saw BMT today and they are decreasing his steroid.Amlodipine started since 8/21 and possibly related to his swelling. He has multiple SEs from steroid such as weight gain , central obesity, easily bruised, increased appetite and partial insomnia. He has some nocturia. He has no dysuria or hematuria. Recently, he had cat scrath incidence and being treated with doxycycline. He has abdominal distension and gained weight progressively. He drinks 120 Oz of tea per day. We started chlorthalidone and reduced amlodipine.   12/1-12/3/22: Was admitted for dyspnea and malaise concerning for belumosudil intolerance. However, at the same time he also had flu A positive. He was taken off of amlodipine on 12/6/22. And subsequently chlorthalidone was off in 12/13/22 when Na was 128. Na improved afterwards.   1/10/23:  He feels good today. His BP are now very good 110-130/70 mmHg. He is not on any BP medication ayt this time. He is taking Belumosidil.  Symptoms of cGVHD have been improving.  He has some LE edema and easily bruised likely from steroid/belumosudil.   Labs: Creatinine 1.11, potassium 4.8,, dioxide 28, phosphorus 2.3, albumin 4.2, calcium 10.1.  Hemoglobin 11.3, platelet 140. UA pending.   6/13/23: In the interim, he went to the ED on 5/14/23 for testicular pain. CT showed no hernia or mass. Hew was also diagnosed with spine osteomyelitis and Epidural abscess and being treated with Ceftraixone for 6 weeks. Noted stable cGVHD. He also had COVID in 1/23. Now on physiologica dose of steroid. Still has dry eyes and a bit of mouth sore.  He drinks about 3-4 glasses of milk per day. He still has back pain that radiating downt to right leg. He completed ceftriaxone in 5/25/23. Appetite is stable as well as his weight. He has minimal swelling. He is on prednisone 4 mg alternate with 10 mg per day. Labs 6/13/23: Cr 1.10, BUN 46.2, K 5.1, Calcium 10.3, Phos 3.1, Albumin 4.0. Hb 11.7, Platelet 182, WBC 10.1.   10/16/23: In interim, he was diagnosed with L5-S1 discitis osteomyelitis at [facility] after presented with ongoing back pain.  MRI showing findings consistent with ongoing discitis-osteomyelitis at L5-S1 which has progressed since last imaging in June. He was seen by ID and neurosurgery. He underwent disc aspiration.  ID is recommending another 6 weeks course of antibiotics with a different regimen: Invanz and vancomycin. Neurosurgery did not recommend any surgical intervention at this time. Vanco dose was reduced on 10/10/23 due to kidney function and stopped last Thursday when Cr was 1.87. Lately, his blood pressure has been increasing to 150-160 and sometimes 180. I asked him to resume amlodipine 5 mg.  After stopping vancomycin, BP has improved along with his appetite. Cr has improved  to 1.68 on 10/16/23 ([facility] labs). He has gained some weight. His swelling was worse when he was admitted in the hospital. Now he is on Ertapenenm and clindamycin  oral until 10/23. Pain is mostly when he lays too long and going to get up. He is still using fentanyl patch but the dose was reduced. Belumosidil was just on hold.  Labs on 10/10/2023 showed creatinine 1.56 but Cr 1.678 on 10/16/23 ([facility] lab), BUN 26, GFR 49, potassium 3.5,, dioxide 28, calcium 10.2, albumin 4.1, alkaline phosphatase 141.  CBC show white blood cell 13, hemoglobin 10.9, platelet 213. Vancomycin at [facility] was 21.4 on 10/9/23. CRP 2.2 and ESR 46. UA on 10/12/23 showed no blood but UPCR is 0.2 g/g.   11/14/23: He has been on Augmentin and Doxycyline plan until end of this month . Clindamycin upsets his currently due to main complaint is low energy and restless leg syndrome that started about 2 weeks ago.  He recently stopped taking magnesium and iron a month ago.  Stomach. He has minor swelling. He is off of fentanyl 2 weeks ago. BP has been in the 120-130/80s. His Cr has improved and from [facility] lab, Cr 1.26 on 11/6/23. He has been eating and drinking well. He is on prednisone. He started to have restless leg.  Labs today show WBC 11.5, Hb 11.6.  Creatinine 1.4, EGFR 56, potassium 4.8, bicarb 24.  calcium 10.1 albumin 4.4.  CRP less than 3, sed rate 27.  UA showed protein 50 mg/dL.  Platelets of 2, RBC less than 1.UPCR pending.   2/20/24: He has relapsed of oral GVHD and prednisone was increased to 40 mg/d and now on tapering dose. He is also taking valtrex which has helped with HSV. In the meantime, his BP were in the 140-150 so chlorthalidone 12.5 mg was added at the end of January 2024. He also was admitted at the end of January for fever, chest pain but all work-up were unrevealing. He was treated him with Doxycycline for 14 days and now he feels better. Labs today showed Na 133, K 4.6, Bicarb 25, Cr 1.06 and BUN 40.6. CBC showed WBC 11.7, Hb 12.6. UA is bland. UPCR 0.27 g/g.     Past medical history  Past Medical History:   Diagnosis Date    ALL (acute lymphocytic leukemia) (H)     CKD (chronic  kidney disease)     GVHD (graft versus host disease) (H)     H/O peripheral stem cell transplant (H)     Hyperthyroidism 1996    Infection due to 2019 novel coronavirus 01/16/2023    Influenza A 11/2022    Postablative hypothyroidism 1997    Pulmonary embolism (H)     Renal cell carcinoma (H) 2007    right kidney    Sleep apnea        Past surgical history  Past Surgical History:   Procedure Laterality Date    APPENDECTOMY      BACK SURGERY  2017    BONE MARROW BIOPSY, BONE SPECIMEN, NEEDLE/TROCAR Left 09/02/2021    Procedure: BIOPSY, BONE MARROW;  Surgeon: Jailyn Ny APRN CNP;  Location: [facility] OR    BONE MARROW BIOPSY, BONE SPECIMEN, NEEDLE/TROCAR Left 11/15/2021    Procedure: BIOPSY, BONE MARROW;  Surgeon: Socrates De La Torre;  Location: [facility] OR    BONE MARROW BIOPSY, BONE SPECIMEN, NEEDLE/TROCAR Left 02/07/2022    Procedure: BIOPSY, BONE MARROW;  Surgeon: Renetta Wiggins PA-C;  Location: [facility] OR    BONE MARROW BIOPSY, BONE SPECIMEN, NEEDLE/TROCAR Left 08/18/2022    Procedure: BIOPSY, BONE MARROW;  Surgeon: Lorrie Yap PA-C;  Location: [facility] OR    BONE MARROW BIOPSY, BONE SPECIMEN, NEEDLE/TROCAR Left 08/07/2023    Procedure: Bone marrow biopsy, bone specimen, needle/trocar;  Surgeon: Teressa Rick PA-C;  Location: [facility] OR    BRONCHOSCOPY (RIGID OR FLEXIBLE), DIAGNOSTIC N/A 04/29/2022    Procedure: BRONCHOSCOPY, WITH BRONCHOALVEOLAR LAVAGE;  Surgeon: Murtaza Garcia MD;  Location: [facility]    IR CVC TUNNEL PLACEMENT > 5 YRS OF AGE  08/04/2021    IR CVC TUNNEL REMOVAL LEFT  09/02/2021    IR LIVER BIOPSY PERCUTANEOUS  02/21/2022    NEPHRECTOMY  2007    ORTHOPEDIC SURGERY      bilateral shoulder surgeries    PERCUTANEOUS BIOPSY LIVER N/A 02/21/2022    Procedure: NEEDLE BIOPSY, LIVER, PERCUTANEOUS;  Surgeon: Trace Castellanos MD;  Location: [facility] OR    PHACOEMULSIFICATION WITH STANDARD INTRAOCULAR LENS IMPLANT Left 1/15/2024    Procedure: LEFT EYE PHACOEMULSIFICATION, CATARACT, WITH STANDARD  INTRAOCULAR LENS IMPLANT INSERTION;  Surgeon: Deshawn Menezes MD;  Location: [facility] OR    PHACOEMULSIFICATION WITH STANDARD INTRAOCULAR LENS IMPLANT Right 2/5/2024    Procedure: RIGHT EYE PHACOEMULSIFICATION, CATARACT, WITH STANDARD INTRAOCULAR LENS IMPLANT INSERTION;  Surgeon: Deshawn Menezes MD;  Location: [facility] OR       Review of Systems:   14 systems were reviewed and all negative except as mentioned above.   Current Medications:  Current Outpatient Medications   Medication    doxycycline monohydrate (MONODOX) 100 MG capsule    acetaminophen (TYLENOL) 500 MG tablet    acetylcysteine 10 % (MUCOMYST) 10% SOLN    acyclovir (ZOVIRAX) 400 MG tablet    amLODIPine (NORVASC) 5 MG tablet    Carboxymethylcell-Glycerin PF (REFRESH RELIEVA PF) 0.5-1 % SOLN    chlorthalidone (HYGROTON) 25 MG tablet    dexAMETHasone alcohol-free (DECADRON) 0.1 MG/ML solution    escitalopram (LEXAPRO) 10 MG tablet    fluorometholone (FML LIQUIFILM) 0.1 % ophthalmic suspension    hydrOXYzine (VISTARIL) 25 MG capsule    levothyroxine (SYNTHROID/LEVOTHROID) 125 MCG tablet    LORazepam (ATIVAN) 1 MG tablet    moxifloxacin (VIGAMOX) 0.5 % ophthalmic solution    moxifloxacin (VIGAMOX) 0.5 % ophthalmic solution    Multiple Vitamins-Minerals (MENS MULTIVITAMIN) TABS    nicotine polacrilex (NICORETTE) 4 MG gum    Nutritional Supplements (ENSURE COMPLETE SHAKE) LIQD    omeprazole (PRILOSEC) 40 MG DR capsule    oxyCODONE (ROXICODONE) 5 MG tablet    penicillin V (VEETID) 250 MG tablet    polyethylene glycol (MIRALAX) 17 GM/Dose powder    prednisoLONE acetate (PRED FORTE) 1 % ophthalmic suspension    prednisoLONE acetate (PRED FORTE) 1 % ophthalmic suspension    predniSONE (DELTASONE) 1 MG tablet    predniSONE (DELTASONE) 10 MG tablet    predniSONE (DELTASONE) 20 MG tablet    predniSONE (DELTASONE) 5 MG tablet    rosuvastatin (CRESTOR) 5 MG tablet    sennosides (SENOKOT) 8.6 MG tablet    sodium chloride 0.9 % neb solution     No current  facility-administered medications for this visit.       Physical Exam:   /86   Pulse 89   Temp 98.1  F (36.7  C) (Oral)   Wt 111.6 kg (246 lb)   SpO2 98%   BMI 32.46 kg/m     Body mass index is 32.46 kg/m .    GENERAL APPEARANCE: Alert, not in acute distress; pleasant. + Cushingoid appearance.   EYES:  Not pale conjunctiva, pupils equal  HENT: Mouth without ulcers or lesions  PULM: lungs clear to auscultation bilaterally, equal air movement, no clubbing  CV: regular rhythm, normal rate, no rub     -JVD no distended.      -edema: No edema.   GI: soft,  - tender, no distended, bowel sounds are present  INTEGUMENT: No rashes.   NEURO:  Non focal. No asterixis.     Labs:   All labs reviewed by me  Last Renal Panel:  Sodium   Date Value Ref Range Status   02/20/2024 133 (L) 135 - 145 mmol/L Final     Comment:     Reference intervals for this test were updated on 09/26/2023 to more accurately reflect our healthy population. There may be differences in the flagging of prior results with similar values performed with this method. Interpretation of those prior results can be made in the context of the updated reference intervals.    07/08/2021 139 133 - 144 mmol/L Final     Potassium   Date Value Ref Range Status   02/20/2024 4.6 3.4 - 5.3 mmol/L Final   11/15/2022 5.0 3.4 - 5.3 mmol/L Final   07/08/2021 3.9 3.4 - 5.3 mmol/L Final     Potassium POCT   Date Value Ref Range Status   12/01/2022 4.8 3.4 - 5.3 mmol/L Final     Chloride   Date Value Ref Range Status   02/20/2024 96 (L) 98 - 107 mmol/L Final   11/15/2022 101 94 - 109 mmol/L Final   07/08/2021 108 94 - 109 mmol/L Final     Carbon Dioxide   Date Value Ref Range Status   07/08/2021 23 20 - 32 mmol/L Final     Carbon Dioxide (CO2)   Date Value Ref Range Status   02/20/2024 25 22 - 29 mmol/L Final   11/15/2022 30 20 - 32 mmol/L Final     Anion Gap   Date Value Ref Range Status   02/20/2024 12 7 - 15 mmol/L Final   11/15/2022 3 3 - 14 mmol/L Final   07/08/2021  7 3 - 14 mmol/L Final     Glucose   Date Value Ref Range Status   02/20/2024 105 (H) 70 - 99 mg/dL Final   11/15/2022 113 (H) 70 - 99 mg/dL Final   07/08/2021 107 (H) 70 - 99 mg/dL Final     GLUCOSE BY METER POCT   Date Value Ref Range Status   12/03/2022 116 (H) 70 - 99 mg/dL Final     Urea Nitrogen   Date Value Ref Range Status   02/20/2024 40.6 (H) 8.0 - 23.0 mg/dL Final   11/15/2022 51 (H) 7 - 30 mg/dL Final   07/08/2021 24 7 - 30 mg/dL Final     Creatinine   Date Value Ref Range Status   02/20/2024 1.06 0.67 - 1.17 mg/dL Final   07/08/2021 0.94 0.66 - 1.25 mg/dL Final     GFR Estimate   Date Value Ref Range Status   02/20/2024 78 >60 mL/min/1.73m2 Final   07/08/2021 86 >60 mL/min/[1.73_m2] Final     Comment:     Non  GFR Calc  Starting 12/18/2018, serum creatinine based estimated GFR (eGFR) will be   calculated using the Chronic Kidney Disease Epidemiology Collaboration   (CKD-EPI) equation.       Calcium   Date Value Ref Range Status   02/20/2024 10.1 8.8 - 10.2 mg/dL Final   07/08/2021 8.9 8.5 - 10.1 mg/dL Final     Phosphorus   Date Value Ref Range Status   02/20/2024 3.4 2.5 - 4.5 mg/dL Final     Albumin   Date Value Ref Range Status   02/20/2024 4.3 3.5 - 5.2 g/dL Final   11/15/2022 3.6 3.4 - 5.0 g/dL Final   07/08/2021 3.7 3.4 - 5.0 g/dL Final       Imaging:  I reviewed imaging studies.     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 However, he had worsening swelling and increased Cr to 1.39. At that time, it was concerned that he may have sirolimus toxicity. But he only had UCPR 0.39 g/g at that time. However, Sirolimus was stopped. I started him on low dose chlorthalidone and reduced dose amlodipine and his swelling improved but he developed hypotension so all blood pressure medications were discontinued.  His creatinine then came down to be stable at 1.1 with a EGFR in the 70s.  However, he developed acute kidney injury stage I again likely secondary to vancomycin toxicity in October 2023. Cr peaked at 1.87 on 10/12/23 but later on improved and now down to 1.0-1.1.   - Stay well hydrated  - Low salt diet; NA 2000 mg per day  # L5/S1 discitis; rescurrent  Started on Ertapenem and Vanco since August 2023, plan for 6 weeks but Vanco stopped on 10/12/23 due to NEGRA  replaced with Clindamycin but then switched to Augmentin and doxycycline. Now completed Abx.  # Hypertension; well controlled  Blood pressure started to increase after he developed NEGRA in October 2023.  It was as high as 180s sometimes.  After he resumed amlodipine 5 mg, BP improved. However, BP worsened after escalation of prednisone dose in Dec 2023 so Cholrthalidone was added since 1/24/24 with improvement in swelling and BP. However, he has mild hypoNa today.  - Continue Amlodipine 5 mg daily and chlorthalidone 12.5 mg daily  - If he has persistent hyponatremia worsening hyponatremia, considering switching to torsemide 5 mg per day  - Eventually, I am leaning toward starting ACEi/ARB for proteinuria  # Mild hypercalcemia; resolved  Calcium was mildly elevated at 10.3 last visit but today is 10.1. The patient is taking calcium vitamin D 1 tabs per day. Vit D is 65 on 6/13/23. PTH is 60 on 10/18/22.   # Mild hypoNa  Na 133.  Likely from chlorthalidone but will continue to watch for now if there is persistent or worsening then consider switching to torsemide as  above.     Follow-up in 4 months with labs    I spent  35 minutes on the date of the encounter doing chart review, history and exam, documentation and further activities as noted above. 22 minutes of this visit is dedicated to direct patient interaction via face to face.     Keegan Chew MD on 02/20/2024

## 2024-02-21 DIAGNOSIS — D89.813 GVHD AS COMPLICATION OF BONE MARROW TRANSPLANT (H): ICD-10-CM

## 2024-02-21 DIAGNOSIS — Z94.81 S/P BONE MARROW TRANSPLANT (H): Primary | ICD-10-CM

## 2024-02-21 DIAGNOSIS — T86.09 GVHD AS COMPLICATION OF BONE MARROW TRANSPLANT (H): ICD-10-CM

## 2024-02-21 DIAGNOSIS — Z94.81 STATUS POST BONE MARROW TRANSPLANT (H): Primary | ICD-10-CM

## 2024-02-21 DIAGNOSIS — C91.01 ACUTE LYMPHOBLASTIC LEUKEMIA (ALL) IN REMISSION (H): ICD-10-CM

## 2024-02-21 PROCEDURE — 94642 AEROSOL INHALATION TREATMENT: CPT | Performed by: PHYSICIAN ASSISTANT

## 2024-02-21 PROCEDURE — 94640 AIRWAY INHALATION TREATMENT: CPT | Performed by: PHYSICIAN ASSISTANT

## 2024-02-21 RX ORDER — ALBUTEROL SULFATE 0.83 MG/ML
2.5 SOLUTION RESPIRATORY (INHALATION)
Status: COMPLETED | OUTPATIENT
Start: 2024-02-21 | End: 2024-02-21

## 2024-02-21 RX ORDER — ALBUTEROL SULFATE 0.83 MG/ML
2.5 SOLUTION RESPIRATORY (INHALATION)
Status: CANCELLED | OUTPATIENT
Start: 2024-02-21 | End: 2024-02-21

## 2024-02-21 RX ORDER — PENTAMIDINE ISETHIONATE 300 MG/300MG
300 INHALANT RESPIRATORY (INHALATION)
Status: COMPLETED | OUTPATIENT
Start: 2024-02-21 | End: 2024-02-21

## 2024-02-21 RX ORDER — PENTAMIDINE ISETHIONATE 300 MG/300MG
300 INHALANT RESPIRATORY (INHALATION)
Status: CANCELLED | OUTPATIENT
Start: 2024-02-21 | End: 2024-02-21

## 2024-02-21 RX ADMIN — PENTAMIDINE ISETHIONATE 300 MG: 300 INHALANT RESPIRATORY (INHALATION) at 09:57

## 2024-02-21 RX ADMIN — ALBUTEROL SULFATE 2.5 MG: 0.83 SOLUTION RESPIRATORY (INHALATION) at 09:56

## 2024-02-21 NOTE — PROGRESS NOTES
Patient was seen today for a Pentamidine nebulizer tx ordered by Dr. Avila Tobar.    Patient was first given 2.5 mg of albuterol nebulizer, after which Pentamidine 300 mg (Lot # [id number]) mixed with 6cc Sterile H20 was administered through a filtered nebulizer.    Pre-treatment: SpO2 = 98%   HR = 102   BBS = clear   Post-treatment: SpO2 = 97%  HR = 105  BBS = clear    No adverse side effects noted by the patient.    This service today was provided under the direct supervision of Dr. Frost, who was available if needed.     Procedure was completed by Pat Medina.

## 2024-02-22 ENCOUNTER — CARE COORDINATION (OUTPATIENT)
Dept: TRANSPLANT | Facility: CLINIC | Age: 64
End: 2024-02-22
Payer: MEDICARE

## 2024-02-22 DIAGNOSIS — E89.0 POSTABLATIVE HYPOTHYROIDISM: Primary | ICD-10-CM

## 2024-02-22 RX ORDER — LEVOTHYROXINE SODIUM 112 UG/1
112 TABLET ORAL DAILY
Qty: 90 TABLET | Refills: 4 | Status: SHIPPED | OUTPATIENT
Start: 2024-02-22

## 2024-02-22 NOTE — PROGRESS NOTES
Spoke with Nik's wife Lamar to check in. Reported improvement of respiratory symptoms and no fevers. Plan to complete doxycycline dose on Saturday and resume penicillin the following day. Plan to see Dr. Avila Tobar next week, 2/27.

## 2024-02-23 ENCOUNTER — TELEPHONE (OUTPATIENT)
Dept: NEPHROLOGY | Facility: CLINIC | Age: 64
End: 2024-02-23
Payer: MEDICARE

## 2024-02-23 DIAGNOSIS — I10 HYPERTENSION, ESSENTIAL: ICD-10-CM

## 2024-02-23 RX ORDER — AMLODIPINE BESYLATE 5 MG/1
5 TABLET ORAL DAILY
Qty: 90 TABLET | Refills: 3 | Status: SHIPPED | OUTPATIENT
Start: 2024-02-23

## 2024-02-23 NOTE — TELEPHONE ENCOUNTER
2/23- Medication filled and sent to pts pharmacy.  Ayan MAXWELL RN  Nephrology care coordinator  [facility]

## 2024-02-26 NOTE — PROGRESS NOTES
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 Recent oral HSV and oral cGVHD flare treated with prednisone.     HPI:    Nik returns to clinic for follow up with [facility]. Her has extensive cardiac work up recently at ED when he presented with fever and CP. Fever recurrence seen by PCP on 2/9 and started on doxycyline for possible PNA/bronchitis now with resolved nonproductive cough and afebrile since. Mouth overall good, HSV resolved, doing dexamethasone swish and spit 2 times a day, no open ulcers, roger to eat. Saw nephrology recently advised to follow lytes.  Vision better. Using eyedrops 3-4 times a day only down from more than 10 times per day earlier in the summer, using scleral lenses. Still with lower energy, but good quality of life and ocular symptoms.  No new respiratory symptoms.  No chest pain.  No nausea vomiting or diarrhea.  No bleeding and no headaches.    Review of Systems                                                                                                                                         10 point Review of systems was negative except as detailed above     PHYSICAL EXAM                                                                                                                                                   KPS: 70    Wt Readings from Last 4 Encounters:   02/20/24 111.6 kg (246 lb)   02/05/24 113.4 kg (250 lb)   01/24/24 115.3 kg (254 lb 4.8 oz)   01/15/24 108.9 kg (240 lb)      General: NAD.  HEENT: sclera anicteric, injected conjunctivae.  11/14/2023 No noted lichenoid changes in the oral mucosa previous prominent area of healed ulcers in the right/left mid buccal mucosa are less prominent with overall improved appearence and more normal appearing mucosal tissue, no ulcers seen.    1/9/2024 mild lichenoid changes and healing ulcers on both buccal mucosa no open  discolored lesions noted  2/27/2024  no lichenoid changes, healing ulcers/minimal scaring on both buccal mucosa  Lungs:  CTA b/l  no wheezes  CV: RRR  no gallop  Abd; soft, NABS, protuberant  Ext/ Skin: Skin bruising on bilateral forearms,  fainting of previous hyperpigmented areas of previously known cGVHD  LE trace bilateral edema in lower extremities- discussed compression socks    cGVHD therapy started on February 24 2022  - Baseline NIH scoring 2/24/2022 : skin 2 maculopapular rash/erythema with no sclerotic features, mouth score 1 mild symptoms with lichenoid changes less than 25%, eyes score 2 moderate symptoms without new visual impairments due to KCS requiring lubricant eyedrops more than 3 times a day, overall mild scale for her provider  - 3/25/2022 1 month NIH treatment assessment on PQRST: skin 2, mouth score 1 mild symptoms with NO lichenoid changes, eyes score 2 moderate symptoms w requiring lubricant eyedrops more than 3 times a day, liver score 1 ALT 3.7x ULN and nl bilirubin   - 3/31  Mouth clear; eyes minimal LFT still abn; no joint or new GI sx  - 4/28 Mouth clear, Left>R eye is mild sclera erythema, lids are pink and irritated appearing, LFT's slow improvement, no new joint, skin, or GI symptoms.   -5/11 mouth with mild erythema but no lichenoid changes, eyes using eyedrops 4-6 times a day score of 2, LFTs significantly improved from baseline slight elevation in alk phos and AST with normal bilirubin, skin with hyperpigmentation from old acute GVHD no active skin rashes, no GI symptoms no musculoskeletal complaints.  -5/26 Mouth with very slight lichenoid changes, erythema.  Eyes still using eyedrops 4-6x per day.  LFTs essentially normal with slight elevation in alk phos.  Skin with hyperpigmentation from old acute GVHD, no active rashes, no GI or MSK concerns.  Skin 0, mouth 0 but with lichenoid changes, eyes 2  -6/7/22 Mouth with no lichenoid changes, erythema.  Eyes still using eyedrops 4-6x  per day.  LFTs essentially normal with slight elevation in alk phos.  Skin with hyperpigmentation from old acute GVHD, no active rashes, no GI or MSK concerns.  Skin 0, mouth 0, eyes 2     -6 month PQRST assessment 9/6/2022: eyes 3, mouth 0 for symptoms, 25% lichenoid changes (1), liver/GI/skin 0    11/8/2022, 11/22/2022, 12/13/22 cGVHD NIG scoring eyes 3, no other organ involvement clinically  3/28/23 cGVHD NIG scoring eyes 3, no other organ involvement clinically  5/2/23 cGVHD NIG scoring eyes 3, oral 1, no other organ involvement clinically  6/13/23 cGVHD NIG scoring eyes 3, oral 1, no other organ involvement clinically  8/15/23 cGVHD NIG scoring eyes 3 (down to 3-4 times eye drop from >10 but still using scleral lenses), oral 1, no other organ involvement clinically  10/10/2023 cGVHD NIG scoring eyes 2-3 (down to 3-4 times eye drop from >10 but still using scleral lenses), oral 1, no other organ involvement clinically  11/14/2023 cGVHD NIG scoring eyes 2-3 (down to 3-4 times eye drop from >10 but still using scleral lenses), oral 1, no other organ involvement clinically  1/9/2024 cGVHD NIG scoring eyes 2-3 (down to 3-4 times eye drop from >10 but still using scleral lenses), oral 2, no other organ involvement clinically  2/27/2024 cGVHD NIG scoring eyes 2-3 (down to 3-4 times eye drop from >10 but still using scleral lenses), oral 1, no other organ involvement clinically    Lab:    Lab Results   Component Value Date    WBC 11.7 (H) 02/20/2024    ANEU 3.5 10/26/2021    HGB 12.6 (L) 02/20/2024    HCT 35.2 (L) 02/20/2024     02/20/2024     (L) 02/20/2024    POTASSIUM 4.6 02/20/2024    CHLORIDE 96 (L) 02/20/2024    CO2 25 02/20/2024     (H) 02/20/2024    BUN 40.6 (H) 02/20/2024    CR 1.06 02/20/2024    MAG 2.0 11/14/2023    INR 0.90 12/01/2022    BILITOTAL 0.8 01/24/2024    AST 30 01/24/2024    ALT 38 01/24/2024    ALKPHOS 90 01/24/2024    PROTTOTAL 6.3 (L) 01/24/2024    ALBUMIN 4.3 02/20/2024      4/28/2023    MRI Lumbar spine  1.  The previously seen ventral epidural abscess has resolved with small amount of residual ventral epidural phlegmon.   2.  Marrow edema about the L5-S1 endplates has mildly worsened within new rim-enhancing subchondral lesion in the left S1 superior endplate. This could reflect progression of osteomyelitis.      MRI hip right  1.  No evidence of septic arthritis of the right hip joint.   2.  There is degeneration and likely tearing of the right acetabular labrum anterosuperiorly, and probably low-grade chondromalacia of the right hip joint.   3.  Abnormality at L5-S1 compatible with known discitis-osteomyelitis.    ASSESSMENT AND PLAN   Nik Nava is a 63 year old male with Ph Pos ALL, day 930 s/p sib allo stem cell transplant    Day -6 (8/4): flu/cy  Day -5 through day -2 (8/5-8/8): flu  Day -1 (8/9): TBI  Day 0 (8/10): transplant     1.  Acute lymphoblastic leukemia, [place] chromosome positive in CR, MRD neg. S/p allo sib PBSCT. (ABO matched)  HCT-CI score: 3 (prior solid tumor)  Day +100 bone marrow biopsy is 100% donor with no morphologic or flow cytometric evidence of leukemia BCR abl is pending.  - Day +180 restaging BM bx- still in CR  100% donor;  2/16/22 BCR ABL major breakpoint undetectable.   - 1 year restaging 8/18/2022: Markedly hypocellular bone marrow [less than 10% cellular] with maturing trilineage hematopoiesis and no definite morphologic or immunophenotypic evidence for involvement by B lymphoblastic leukemia. BCRABL1 PCR not detected, bone marrow % donor. FISH NORMAL No evidence of BCR-ABL1 fusion  - Maintenance with TKI posttransplantation given his Ph positive ALL that have not been started previously presumed secondary to multiple active issues specifically infections and COVID.  Per Avila Tobar: will reconsider this patient will think about whether this is something he would like to consider he was previously on Dasatinib, we would start on  a low dose and increase as tolerated.  This is on hold now pending active GVHD therapy, we discussed on this visit 10/18 in agreement with holding off.  - 2 year restaging 8/7/2023 BMB: - No morphologic or immunophenotypic evidence of recurrent/persistent B-lymphoblastic leukemia. Slightly hypocellular marrow (10-20% estimated cellularity, patchy and variable), with trilineage hematopoetiic maturation and less than 2% blasts. Peripheral blood showing slight normocytic normochromic anemia and slight thrombocytopenia. BM % donor. FISH No evidence of BCR::ABL1 fusion /CG No recipient (male) cells  or cells with a t(9;22) or other clonal chromosomal abnormality were  detected among the 20 metaphases analyzed.      2.  HEME: CBC stable.   3/2023 iron low 28, iron saturation index 10%, transferrin 225, ferritin 1381 down trending (in retrospect that was the time of epidural abscess as well).  B12, folate normal.  High copper and zinc borderline low, 5/2023 started zinc.  Started iron replacement in 4/2023, recheck iron profile on follow up more consistent with AOCD, discontinued iron. Iron 11/14/23 WNL. Note ferritin elevation is in the setting of discitis/OM, will reassess.    3.  FEN/Renal: Creatinine 0.97, s/p nephrectormy, nephrology following     4.    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   1/9/2/2024 cGVHD NIG scoring eyes 2-3, oral 1, no other organ involvement clinically   #Skin GVHD- history of biopsy proven GVHD of the skin 8/30/2021; resolved.   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 WNL today   # Ocular GVHD: follows with ophthalmology. Maxitrol to lids, continue refreshing drops QID. Score 3, but down to 3-4 times eye drops from >10/day. Not needing Sl all the time.  Scheduled for cataract surgeries.  Followed closely by Dr. Juarez with significant improvement with scleral lenses and eyedrops  # Oral GVHD: dex s/s 3-4  x.  Lichenoid changes have largely resolved with no open ulcers since 11/8/2022, December 2023 for flare of oral GVHD in the setting of oral herpes reactivation    Previous therapies  Tacrolimus-previously decided to stay on same dose, no checks of levels if clinically stable, and no need switch to sirolimus. (keep tac low as gvhd is quiet and viremia). 5/10 level 45 with starting of COVID therapy and D-D intractions (Paxlovid for COVID19, which has a drug interaction with tacrolimus-Ritonavir increases serum levels of tacrolimus).   Tacrolimus level subtherapeutic, increase to 2.5 mg twice daily recently, 8/23/2022 instructed to change tacrolimus to 2 mg twice daily. 9/6 with mild NEGRA, hyperkalemia, hypomagnesemia, and reports some tremors and cramps, suspect tacrolimus to be high therefore empirically decreased to 1.5 twice daily, skip morning dose of tacrolimus and took 2 mg today after his afternoon labs. Decrease to 1mg BID on Saturday.  Sirolimus  9/21 despite fluids and a dose adjustment of tacrolimus patient seem to have persistence NORBERTO related NEGRA, therefore after discussion with the patient decision was made to transition to sirolimus that was started on 9/21, patient initially seem to tolerate this well with normalization of kidney function  10/11 presented with flares of ocular and oral GVHD, fluid retention and gain of 5-6 kg, lower extremity edema, abdominal distention, total protein to creatinine ratio elevated at 0.4, in addition to elevation in BUN and creatinine, HTN.  Picture most consistent with sirolimus related side effects.  Less likely that the patient will tolerate even a lower level of sirolimus.  Therefore the patient was instructed to stop sirolimus, last dose on 10/10. 10/14 level 3.5 appropriately trending down.     Corticosteroids  3/1-3/16: prednisone 100mg every day  3/17 75mg every day   3/31 75 alt with 50  4/6: 75 alt with 25mg every other day  4/12 pred tapered to 60 alternating  with 25mg   4/15 In setting of viruses trying to reactivate,GVHD stable: Taper 60/15mg alternating.  4/20 decrease pred further to 50/10.    4/25 taper pred to 50/0 every other day as long as symptoms stable.   5/2 Pred decrease 40/0 alternating every other day.   5/13 decrease to 30/0  5/20 decrease to 20/0 then slowly by 5 mg weekly  6/7 decrease to 10/0  Went up to 15/0 with mild increased LFTs  8/23/2022 increased to 20/0, with persistence of oral ulcers and active ocular GVHD.  Discussed with the patient the importance of stopping chewing of nicotine gums for prolonged hours as that would not help with the healing of the oral ulcers.  I discussed with the patient alternatives of nicotine patches that he will reconsider and let me know.  9/6 decreased to 15 alternating with 0  9/13 decreased to 10 alternating with 0  9/21 discontinued tacrolimus given persistent NEGRA and single kidney and start the patient on sirolimus without loading dose.  We will get a list of possible side effects and adverse events from the Pharm.D. see sirolimus section above.  10/11 GVHD flare. Sirolimus stopped. Resume prednisone 40 mg daily as of 10/12, no change with mildly worsening eye pain 10/14  Reduce pred to 35mg 10/25 and 25mg 11/1 11/8 20 mg   11/22 15 mg  12/13/22 10 mg (plan to taper to 5mg then slow taper 1 mg per month given long pred history)  2/23/23 lower from 5 mg to 4 mg for the next month  3/28/2023 - 5/2/2023- 8/15/2023 continue on 10/4 given adrenal insufficieny with recent am cortisol check and clinical symptoms on taper to lower dose of 4 mg daily  -12/2023 had oral GVHD flare started on Prednisone 40 mg,  start taper to 40 and 30 mg alternating for 1 week then 30 mg daily for 1 week, then 30 and 20 alternating for 1 week, then 20 for 1 week till he sees me.  - 2/27/2024 decrease from 10/20mg prednisone to 10/15 then 10/10 doing down by 5mg weekly    Belumosudil  Patient intolerant/with disease flares on  tacrolimus and sirolimus see above.  Discussed with the patient the different options for therapy of chronic GVHD that are FDA approved.  Given the side effect profiles after discussing in detail and given the least nephrotoxicity and expected response, taking into account other metabolic effect as well.  We will proceed with prior authorization with belumosudil.  Patient was given material to review for possible expected adverse events and side effects with both Belumosodil and ruxolitinib.   --- Started on 10/18/2022 - skin/liver/oral GVHD inactive, and occular symptoms controlled/symptomatic improvement.   --- 10/10/2023 will hold belumosodil given recurrent infections ans significant improvement in symptoms with no end organ damage after discussions with him and his wife. Will optimize topical treatment with scleral lenses, eyedrops ans dex s/sp int he interim to avoid flares. Will readdress need to restart systemic treatment pending infectious resolution and symptoms.         5.  ID:   # Recent history of Epidural abscess: Followed by Dr. Candelario ID, plan to be on IV ceftriaxone for at least 6 weeks course through 5/11/2023-to be determined on further follow-up.  See imaging with MRI follow-up as above.   #Recurrent discitis/OM still on treatment through October 2023, cx negative, managed with ID locally.  3/30/2023 blood culture with Staph epidermidis  3/31/2023 BONE, L5, FLUOROSCOPICALLY-GUIDED NEEDLE BIOPSY: Benign bone with trilineage hematopoiesis (normal) Negative for neoplasm Negative for acute osteomyelitis. DISC, L5-S1, ASPIRATION FOR CYTOLOGY: Acellular debris; no cellular material present for evaluation     #History of COVID x2 last 1/2023 and influenza    Treated with Paxlovid with persistent fatigue but no active respiratory symptoms  received his influenza annual vaccine as well as COVID boosters locally 11/16/2022     #History of pulmonary infiltrates:   4/20 CT chest with new RUL infiltrate.  Highly immunocompromised. 4/22 Fungitell/asp GM were neg. BAL 4/29 NGTD, increased micafungin to 150mg IV daily-- f/u 6/1 Dr Garcia CT with mixed results, followed with Dr. Garcia August 2022 with resolution of pulmonary nodules and normalization of LFTs we will switch patient will switch patient to posaconazole prophylactic dose and monitor LFTs and any infectious concerns closely. Will continue till we determine durable discontinuation of IST given high risk history.      #History of CMV viremia:    - CMV viremia up to 1100. 4/15 started valcyte 900mg PO BID. 4/20 CMV <137, 5/10 ND, decreased to 450mg BID 4/30 - continue 4 weeks as long as CMV <137 till 5/28 + weekly CMV. Switched to acyclovir after completion of therapy 5/28/2022. recheck 3/1/23 ND     - PPx:   ACV, will start Shingrix vaccination series and discontinue in 2 months.  Patient developed oral herpes reactivation after stopping acyclovir therefore resumed on 1/9/2023  Posaconazole (previously on micafungin with LFts abl, hx of pulmonary nodules needign treatment dose). 11/14/2023 discontinue and check CT in 2 months given history completed December 2023 with no concerns for infections or nodular lesions.  Pentamidine, last 1/20/2024. Will schedule next dose 2/9/2024.  11/2023 Discontinue azithro 250mg daily (stopped levaquin due to possible tendonitis).  He is on Augmentin and doxycycline for discitis/osteomyelitis.  IgG1/2023 364, 4/2023 460      - EBV viremia: 4/20 CAP CT (w/ contrast): No adenopathy. S/p 4/22 and 5/4/22 rituximab 375mg/m2 5/10 ND x2, 6/7 ND, 8/18/2022 971, 3/28/22 843  S/p covid vaccine series 12/2021  S/p evusheld     - vaccinations: Previously deferred annual vaccines in the setting of GVHD and immunosuppression therapy. 11/14/2023 we will start vaccination series, including Shingrix, COVID-vaccine. 1/9/2023 Shingrix vaccine today.  Needs RSV locally.  Will schedule for the rest of his boosters on 2/9 per his  request.    12/2023:HSV oral lesions; PCR positive. Stopped ACV last month and lesions appears soon thereafter. Empirically Rx Valtrex. mild lingering URI sx; RVP + rhino (12/21); supportive cares. (CCT 12/1/23 neg).started on pred 40mg/d.  Completed Valtrex course and put back on acyclovir restarted 1/20/2024    6. Endo:   - Hx of graves disease; On synthroid follows with endocrine  - HLD: 11/2022 cholesterol 355, triglycerides 153, -- 11/8 started rosuvastatin 5 mg daily, 11/22 CPK within normal, 5/2/2023 repeat lipid profile cholesterol down to 202, triglycerides 191, LDL now normal at 95.  We will continue same dose of rosuvastatin and add fish oil 1g BID (on hold). Repeated August with PCP, who added back fish oil    7. CV:   - Hypertension history   - TTE most recently 10/2022     8. GI:   -Omeprazole for heartburn  -LFTs as above, now normal. Off ursodiol     9. Psych:   - Situational anxiety - lexapro 10mg daily.   - Insomnia: worse on steroids. Ativan, trazadone, melatonin     10. MSK:   -History of left food drop, PT. Occasional muscle cramps discussed optimizing vitamin D levels and considering vitamin D, some of the symptomatology of muscle cramps are likely related to chronic GVHD.  No muscle cramps reported today.  -4/2023 new tearing of the right acetabular labrum anterosuperiorly referred to to orthopedic surgery.       11. HCM/Age appropriate cancer screening:  -Discussed with the patient importance of age-appropriate cancer screening including colonoscopies, he is due for this.  -Discussed importance of at least once a year vitamin D level check, 8/18/2022 wnl  -Screening for secondary iron overload, see heme section above  -Yearly TSH 2022 wnl; yearly lipid panel - see GI section above  -9/6/2022 DEXA scan Based on BMD diagnosis is consistent with normal bone density based on WHO criteria Ref. 1   -PFTs 9/20/22, see above. 9/21/2023 PFTs The FVC, FEV1, FEV1/FVC ratio and ZPS38-94% are  within normal limits. FVC 99% (108), FEV1 91%, DLCO uncorrected 91%.  Repeat PFTs in 4 to 6 months.  -Metabolic other, 8/2022 testosterone within normal  -11/22/2022 discussed with the patient the challenges and the stresses of chronic illness and healthcare fatigue.  Patient had previously been on Lexapro that we restarted confirmed with pharmacy that there are no drug drug interactions.  Additionally we will referred patient to PMNR to help with physical rehab and strength to help with fatigue.  Our social workers will also reach out to Nik and his wife for further resources including support groups for patients with chronic illness and fatigue post transplant.  -Referral to palliative care for symptom and pain management including insomnia     Plan:  - 2/27/2024 decrease from 10/20mg prednisone to 10/15 then 10/10 doing down by 5mg weekly, he will message me weekly with updates   - March AND April pentamidine  -Shingles vaccines completed, to complete the rest when on lower dose steroids  -Continue off belumosodil  -Continue dexamethasone 2 times a day and monitor oral symptoms  - follow with nephrology, endocrinology, PCP, follow-up locally with infectious disease, neurosurgery and pain management for management of osteomyelitis and pain control. PCP follow up for lipid profile, age appropriate cancer screening  - RTC with me on 4/2/24      I spent 45 minutes in the care of this patient today, which included time necessary for preparation for the visit, obtaining history, ordering medications/tests/procedures as medically indicated, review of pertinent medical literature, counseling of the patient, communication of recommendations to the care team, and documentation time.

## 2024-02-27 ENCOUNTER — APPOINTMENT (OUTPATIENT)
Dept: LAB | Facility: CLINIC | Age: 64
End: 2024-02-27
Attending: INTERNAL MEDICINE
Payer: MEDICARE

## 2024-02-27 ENCOUNTER — ONCOLOGY VISIT (OUTPATIENT)
Dept: TRANSPLANT | Facility: CLINIC | Age: 64
End: 2024-02-27
Attending: INTERNAL MEDICINE
Payer: MEDICARE

## 2024-02-27 VITALS
OXYGEN SATURATION: 98 % | RESPIRATION RATE: 16 BRPM | WEIGHT: 249.9 LBS | TEMPERATURE: 97.6 F | BODY MASS INDEX: 32.97 KG/M2 | SYSTOLIC BLOOD PRESSURE: 143 MMHG | DIASTOLIC BLOOD PRESSURE: 82 MMHG | HEART RATE: 90 BPM

## 2024-02-27 DIAGNOSIS — Z94.81 STATUS POST BONE MARROW TRANSPLANT (H): ICD-10-CM

## 2024-02-27 LAB
ALBUMIN SERPL BCG-MCNC: 4.2 G/DL (ref 3.5–5.2)
ALP SERPL-CCNC: 86 U/L (ref 40–150)
ALT SERPL W P-5'-P-CCNC: 39 U/L (ref 0–70)
ANION GAP SERPL CALCULATED.3IONS-SCNC: 14 MMOL/L (ref 7–15)
AST SERPL W P-5'-P-CCNC: 35 U/L (ref 0–45)
BASOPHILS # BLD AUTO: 0 10E3/UL (ref 0–0.2)
BASOPHILS NFR BLD AUTO: 0 %
BILIRUB SERPL-MCNC: 0.7 MG/DL
BUN SERPL-MCNC: 42.5 MG/DL (ref 8–23)
CALCIUM SERPL-MCNC: 10.1 MG/DL (ref 8.8–10.2)
CHLORIDE SERPL-SCNC: 101 MMOL/L (ref 98–107)
CREAT SERPL-MCNC: 1.13 MG/DL (ref 0.67–1.17)
DEPRECATED HCO3 PLAS-SCNC: 24 MMOL/L (ref 22–29)
EGFRCR SERPLBLD CKD-EPI 2021: 72 ML/MIN/1.73M2
EOSINOPHIL # BLD AUTO: 0 10E3/UL (ref 0–0.7)
EOSINOPHIL NFR BLD AUTO: 0 %
ERYTHROCYTE [DISTWIDTH] IN BLOOD BY AUTOMATED COUNT: 16.2 % (ref 10–15)
GLUCOSE SERPL-MCNC: 144 MG/DL (ref 70–99)
HCT VFR BLD AUTO: 34.8 % (ref 40–53)
HGB BLD-MCNC: 12.2 G/DL (ref 13.3–17.7)
IMM GRANULOCYTES # BLD: 0.1 10E3/UL
IMM GRANULOCYTES NFR BLD: 1 %
LYMPHOCYTES # BLD AUTO: 3 10E3/UL (ref 0.8–5.3)
LYMPHOCYTES NFR BLD AUTO: 29 %
MCH RBC QN AUTO: 35.2 PG (ref 26.5–33)
MCHC RBC AUTO-ENTMCNC: 35.1 G/DL (ref 31.5–36.5)
MCV RBC AUTO: 100 FL (ref 78–100)
MONOCYTES # BLD AUTO: 0.7 10E3/UL (ref 0–1.3)
MONOCYTES NFR BLD AUTO: 7 %
NEUTROPHILS # BLD AUTO: 6.7 10E3/UL (ref 1.6–8.3)
NEUTROPHILS NFR BLD AUTO: 63 %
NRBC # BLD AUTO: 0.1 10E3/UL
NRBC BLD AUTO-RTO: 1 /100
PLATELET # BLD AUTO: 174 10E3/UL (ref 150–450)
POTASSIUM SERPL-SCNC: 4.2 MMOL/L (ref 3.4–5.3)
PROT SERPL-MCNC: 6.6 G/DL (ref 6.4–8.3)
RBC # BLD AUTO: 3.47 10E6/UL (ref 4.4–5.9)
SODIUM SERPL-SCNC: 139 MMOL/L (ref 135–145)
WBC # BLD AUTO: 10.6 10E3/UL (ref 4–11)

## 2024-02-27 PROCEDURE — 250N000011 HC RX IP 250 OP 636: Performed by: INTERNAL MEDICINE

## 2024-02-27 PROCEDURE — 99215 OFFICE O/P EST HI 40 MIN: CPT | Mod: 24 | Performed by: INTERNAL MEDICINE

## 2024-02-27 PROCEDURE — 87799 DETECT AGENT NOS DNA QUANT: CPT | Performed by: INTERNAL MEDICINE

## 2024-02-27 PROCEDURE — 36591 DRAW BLOOD OFF VENOUS DEVICE: CPT | Performed by: INTERNAL MEDICINE

## 2024-02-27 PROCEDURE — 80053 COMPREHEN METABOLIC PANEL: CPT | Performed by: INTERNAL MEDICINE

## 2024-02-27 PROCEDURE — G0463 HOSPITAL OUTPT CLINIC VISIT: HCPCS | Performed by: INTERNAL MEDICINE

## 2024-02-27 PROCEDURE — 85025 COMPLETE CBC W/AUTO DIFF WBC: CPT | Performed by: INTERNAL MEDICINE

## 2024-02-27 RX ORDER — HEPARIN SODIUM (PORCINE) LOCK FLUSH IV SOLN 100 UNIT/ML 100 UNIT/ML
5 SOLUTION INTRAVENOUS ONCE
Status: COMPLETED | OUTPATIENT
Start: 2024-02-27 | End: 2024-02-27

## 2024-02-27 RX ADMIN — Medication 5 ML: at 13:24

## 2024-02-27 ASSESSMENT — PAIN SCALES - GENERAL: PAINLEVEL: NO PAIN (0)

## 2024-02-27 NOTE — NURSING NOTE
"Oncology Rooming Note    February 27, 2024 1:40 PM   Nik Nava is a 63 year old male who presents for:    Chief Complaint   Patient presents with    Blood Draw     Port blood draw with heparin flush by lab RN. Vitals taken and appointment arrived    Oncology Clinic Visit     Acute Lymphoblastic Leukemia     Initial Vitals: BP (!) 143/82   Pulse 90   Temp 97.6  F (36.4  C) (Oral)   Resp 16   Wt 113.4 kg (249 lb 14.4 oz)   SpO2 98%   BMI 32.97 kg/m   Estimated body mass index is 32.97 kg/m  as calculated from the following:    Height as of 2/5/24: 1.854 m (6' 1\").    Weight as of this encounter: 113.4 kg (249 lb 14.4 oz). Body surface area is 2.42 meters squared.  No Pain (0) Comment: Data Unavailable   No LMP for male patient.  Allergies reviewed: Yes  Medications reviewed: Yes    Medications: Medication refills not needed today.  Pharmacy name entered into [employer]:    [facility] - [place], [place] - [address] [facility] [place], [place] - [address] [place] [address] [phone]  [facility]  [facility] - [place], [place] - [address] [place] [address]    Frailty Screening:   Is the patient here for a new oncology consult visit in cancer care? 2. No      Clinical concerns: None       Deborah Michaels LPN  2/27/2024              "

## 2024-02-27 NOTE — LETTER
2/27/2024         RE: Nik Nava  [id number] [facility] [id number]        Dear Colleague,    Thank you for referring your patient, Nik Nava, to the [facility] [place]. Please see a copy of my visit note below.      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 Recent oral HSV and oral cGVHD flare treated with prednisone.     HPI:    Nik returns to clinic for follow up with Lam[facility]. Her has extensive cardiac work up recently at ED when he presented with fever and CP. Fever recurrence seen by PCP on 2/9 and started on doxycyline for possible PNA/bronchitis now with resolved nonproductive cough and afebrile since. Mouth overall good, HSV resolved, doing dexamethasone swish and spit 2 times a day, no open ulcers, roger to eat. Saw nephrology recently advised to follow lytes.  Vision better. Using eyedrops 3-4 times a day only down from more than 10 times per day earlier in the summer, using scleral lenses. Still with lower energy, but good quality of life and ocular symptoms.  No new respiratory symptoms.  No chest pain.  No nausea vomiting or diarrhea.  No bleeding and no headaches.    Review of Systems                                                                                                                                         10 point Review of systems was negative except as detailed above     PHYSICAL EXAM                                                                                                                                                   KPS: 70    Wt Readings from Last 4 Encounters:   02/20/24 111.6 kg (246 lb)   02/05/24 113.4 kg (250 lb)   01/24/24 115.3 kg (254 lb 4.8 oz)   01/15/24 108.9 kg (240 lb)      General: NAD.  HEENT: sclera anicteric, injected conjunctivae.  11/14/2023 No noted lichenoid changes in the oral  mucosa previous prominent area of healed ulcers in the right/left mid buccal mucosa are less prominent with overall improved appearence and more normal appearing mucosal tissue, no ulcers seen.    1/9/2024 mild lichenoid changes and healing ulcers on both buccal mucosa no open discolored lesions noted  2/27/2024  no lichenoid changes, healing ulcers/minimal scaring on both buccal mucosa  Lungs:  CTA b/l  no wheezes  CV: RRR  no gallop  Abd; soft, NABS, protuberant  Ext/ Skin: Skin bruising on bilateral forearms,  fainting of previous hyperpigmented areas of previously known cGVHD  LE trace bilateral edema in lower extremities- discussed compression socks    cGVHD therapy started on February 24 2022  - Baseline NIH scoring 2/24/2022 : skin 2 maculopapular rash/erythema with no sclerotic features, mouth score 1 mild symptoms with lichenoid changes less than 25%, eyes score 2 moderate symptoms without new visual impairments due to KCS requiring lubricant eyedrops more than 3 times a day, overall mild scale for her provider  - 3/25/2022 1 month NIH treatment assessment on PQRST: skin 2, mouth score 1 mild symptoms with NO lichenoid changes, eyes score 2 moderate symptoms w requiring lubricant eyedrops more than 3 times a day, liver score 1 ALT 3.7x ULN and nl bilirubin   - 3/31  Mouth clear; eyes minimal LFT still abn; no joint or new GI sx  - 4/28 Mouth clear, Left>R eye is mild sclera erythema, lids are pink and irritated appearing, LFT's slow improvement, no new joint, skin, or GI symptoms.   -5/11 mouth with mild erythema but no lichenoid changes, eyes using eyedrops 4-6 times a day score of 2, LFTs significantly improved from baseline slight elevation in alk phos and AST with normal bilirubin, skin with hyperpigmentation from old acute GVHD no active skin rashes, no GI symptoms no musculoskeletal complaints.  -5/26 Mouth with very slight lichenoid changes, erythema.  Eyes still using eyedrops 4-6x per day.  LFTs  essentially normal with slight elevation in alk phos.  Skin with hyperpigmentation from old acute GVHD, no active rashes, no GI or MSK concerns.  Skin 0, mouth 0 but with lichenoid changes, eyes 2  -6/7/22 Mouth with no lichenoid changes, erythema.  Eyes still using eyedrops 4-6x per day.  LFTs essentially normal with slight elevation in alk phos.  Skin with hyperpigmentation from old acute GVHD, no active rashes, no GI or MSK concerns.  Skin 0, mouth 0, eyes 2     -6 month PQRST assessment 9/6/2022: eyes 3, mouth 0 for symptoms, 25% lichenoid changes (1), liver/GI/skin 0    11/8/2022, 11/22/2022, 12/13/22 cGVHD NIG scoring eyes 3, no other organ involvement clinically  3/28/23 cGVHD NIG scoring eyes 3, no other organ involvement clinically  5/2/23 cGVHD NIG scoring eyes 3, oral 1, no other organ involvement clinically  6/13/23 cGVHD NIG scoring eyes 3, oral 1, no other organ involvement clinically  8/15/23 cGVHD NIG scoring eyes 3 (down to 3-4 times eye drop from >10 but still using scleral lenses), oral 1, no other organ involvement clinically  10/10/2023 cGVHD NIG scoring eyes 2-3 (down to 3-4 times eye drop from >10 but still using scleral lenses), oral 1, no other organ involvement clinically  11/14/2023 cGVHD NIG scoring eyes 2-3 (down to 3-4 times eye drop from >10 but still using scleral lenses), oral 1, no other organ involvement clinically  1/9/2024 cGVHD NIG scoring eyes 2-3 (down to 3-4 times eye drop from >10 but still using scleral lenses), oral 2, no other organ involvement clinically  2/27/2024 cGVHD NIG scoring eyes 2-3 (down to 3-4 times eye drop from >10 but still using scleral lenses), oral 1, no other organ involvement clinically    Lab:    Lab Results   Component Value Date    WBC 11.7 (H) 02/20/2024    ANEU 3.5 10/26/2021    HGB 12.6 (L) 02/20/2024    HCT 35.2 (L) 02/20/2024     02/20/2024     (L) 02/20/2024    POTASSIUM 4.6 02/20/2024    CHLORIDE 96 (L) 02/20/2024    CO2 25  02/20/2024     (H) 02/20/2024    BUN 40.6 (H) 02/20/2024    CR 1.06 02/20/2024    MAG 2.0 11/14/2023    INR 0.90 12/01/2022    BILITOTAL 0.8 01/24/2024    AST 30 01/24/2024    ALT 38 01/24/2024    ALKPHOS 90 01/24/2024    PROTTOTAL 6.3 (L) 01/24/2024    ALBUMIN 4.3 02/20/2024 4/28/2023    MRI Lumbar spine  1.  The previously seen ventral epidural abscess has resolved with small amount of residual ventral epidural phlegmon.   2.  Marrow edema about the L5-S1 endplates has mildly worsened within new rim-enhancing subchondral lesion in the left S1 superior endplate. This could reflect progression of osteomyelitis.      MRI hip right  1.  No evidence of septic arthritis of the right hip joint.   2.  There is degeneration and likely tearing of the right acetabular labrum anterosuperiorly, and probably low-grade chondromalacia of the right hip joint.   3.  Abnormality at L5-S1 compatible with known discitis-osteomyelitis.    ASSESSMENT AND PLAN   Nik Nava is a 63 year old male with Ph Pos ALL, day 930 s/p sib allo stem cell transplant    Day -6 (8/4): flu/cy  Day -5 through day -2 (8/5-8/8): flu  Day -1 (8/9): TBI  Day 0 (8/10): transplant     1.  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 S/p allo sib PBSCT. (ABO matched)  HCT-CI score: 3 (prior solid tumor)  Day +100 bone marrow biopsy is 100% donor with no morphologic or flow cytometric evidence of leukemia BCR abl is pending.  - Day +180 restaging BM bx- still in CR  100% donor;  2/16/22 BCR ABL major breakpoint undetectable.   - 1 year restaging 8/18/2022: Markedly hypocellular bone marrow [less than 10% cellular] with maturing trilineage hematopoiesis and no definite morphologic or immunophenotypic evidence for involvement by B lymphoblastic leukemia. BCRABL1 PCR not detected, bone marrow % donor. FISH NORMAL No evidence of BCR-ABL1 fusion  - Maintenance with TKI posttransplantation given his Ph positive  ALL that have not been started previously presumed secondary to multiple active issues specifically infections and COVID.  Per Avila Tobar: will reconsider this patient will think about whether this is something he would like to consider he was previously on Dasatinib, we would start on a low dose and increase as tolerated.  This is on hold now pending active GVHD therapy, we discussed on this visit 10/18 in agreement with holding off.  - 2 year restaging 8/7/2023 BMB: - No morphologic or immunophenotypic evidence of recurrent/persistent B-lymphoblastic leukemia. Slightly hypocellular marrow (10-20% estimated cellularity, patchy and variable), with trilineage hematopoetiic maturation and less than 2% blasts. Peripheral blood showing slight normocytic normochromic anemia and slight thrombocytopenia. BM % donor. FISH No evidence of BCR::ABL1 fusion /CG No recipient (male) cells  or cells with a t(9;22) or other clonal chromosomal abnormality were  detected among the 20 metaphases analyzed.      2.  HEME: CBC stable.   3/2023 iron low 28, iron saturation index 10%, transferrin 225, ferritin 1381 down trending (in retrospect that was the time of epidural abscess as well).  B12, folate normal.  High copper and zinc borderline low, 5/2023 started zinc.  Started iron replacement in 4/2023, recheck iron profile on follow up more consistent with AOCD, discontinued iron. Iron 11/14/23 WNL. Note ferritin elevation is in the setting of discitis/OM, will reassess.    3.  FEN/Renal: Creatinine 0.97, s/p nephrectormy, nephrology following     4.    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   1/9/2/2024 cGVHD NIG scoring eyes 2-3, oral 1, no other organ involvement clinically   #Skin GVHD- history of biopsy proven GVHD of the skin 8/30/2021; resolved.   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 WNL today   # Ocular GVHD: follows with ophthalmology.  Maxitrol to lids, continue refreshing drops QID. Score 3, but down to 3-4 times eye drops from >10/day. Not needing Sl all the time.  Scheduled for cataract surgeries.  Followed closely by Dr. 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  Lichenoid changes have largely resolved with no open ulcers since 11/8/2022, December 2023 for flare of oral GVHD in the setting of oral herpes reactivation    Previous therapies  Tacrolimus-previously decided to stay on same dose, no checks of levels if clinically stable, and no need switch to sirolimus. (keep tac low as gvhd is quiet and viremia). 5/10 level 45 with starting of COVID therapy and D-D intractions (Paxlovid for COVID19, which has a drug interaction with tacrolimus-Ritonavir increases serum levels of tacrolimus).   Tacrolimus level subtherapeutic, increase to 2.5 mg twice daily recently, 8/23/2022 instructed to change tacrolimus to 2 mg twice daily. 9/6 with mild NEGRA, hyperkalemia, hypomagnesemia, and reports some tremors and cramps, suspect tacrolimus to be high therefore empirically decreased to 1.5 twice daily, skip morning dose of tacrolimus and took 2 mg today after his afternoon labs. Decrease to 1mg BID on Saturday.  Sirolimus  9/21 despite fluids and a dose adjustment of tacrolimus patient seem to have persistence NORBERTO related NEGRA, therefore after discussion with the patient decision was made to transition to sirolimus that was started on 9/21, patient initially seem to tolerate this well with normalization of kidney function  10/11 presented with flares of ocular and oral GVHD, fluid retention and gain of 5-6 kg, lower extremity edema, abdominal distention, total protein to creatinine ratio elevated at 0.4, in addition to elevation in BUN and creatinine, HTN.  Picture most consistent with sirolimus related side effects.  Less likely that the patient will tolerate even a lower level of sirolimus.   Therefore the patient was instructed to stop sirolimus, last dose on 10/10. 10/14 level 3.5 appropriately trending down.     Corticosteroids  3/1-3/16: prednisone 100mg every day  3/17 75mg every day   3/31 75 alt with 50  4/6: 75 alt with 25mg every other day  4/12 pred tapered to 60 alternating with 25mg   4/15 In setting of viruses trying to reactivate,GVHD stable: Taper 60/15mg alternating.  4/20 decrease pred further to 50/10.    4/25 taper pred to 50/0 every other day as long as symptoms stable.   5/2 Pred decrease 40/0 alternating every other day.   5/13 decrease to 30/0  5/20 decrease to 20/0 then slowly by 5 mg weekly  6/7 decrease to 10/0  Went up to 15/0 with mild increased LFTs  8/23/2022 increased to 20/0, with persistence of oral ulcers and active ocular GVHD.  Discussed with the patient the importance of stopping chewing of nicotine gums for prolonged hours as that would not help with the healing of the oral ulcers.  I discussed with the patient alternatives of nicotine patches that he will reconsider and let me know.  9/6 decreased to 15 alternating with 0  9/13 decreased to 10 alternating with 0  9/21 discontinued tacrolimus given persistent NEGRA and single kidney and start the patient on sirolimus without loading dose.  We will get a list of possible side effects and adverse events from the Pharm.D. see sirolimus section above.  10/11 GVHD flare. Sirolimus stopped. Resume prednisone 40 mg daily as of 10/12, no change with mildly worsening eye pain 10/14  Reduce pred to 35mg 10/25 and 25mg 11/1 11/8 20 mg   11/22 15 mg  12/13/22 10 mg (plan to taper to 5mg then slow taper 1 mg per month given long pred history)  2/23/23 lower from 5 mg to 4 mg for the next month  3/28/2023 - 5/2/2023- 8/15/2023 continue on 10/4 given adrenal insufficieny with recent am cortisol check and clinical symptoms on taper to lower dose of 4 mg daily  -12/2023 had oral GVHD flare started on Prednisone 40 mg,  start taper to  40 and 30 mg alternating for 1 week then 30 mg daily for 1 week, then 30 and 20 alternating for 1 week, then 20 for 1 week till he sees me.  - 2/27/2024 decrease from 10/20mg prednisone to 10/15 then 10/10 doing down by 5mg weekly    Belumosudil  Patient intolerant/with disease flares on tacrolimus and sirolimus see above.  Discussed with the patient the different options for therapy of chronic GVHD that are FDA approved.  Given the side effect profiles after discussing in detail and given the least nephrotoxicity and expected response, taking into account other metabolic effect as well.  We will proceed with prior authorization with belumosudil.  Patient was given material to review for possible expected adverse events and side effects with both Belumosodil and ruxolitinib.   --- Started on 10/18/2022 - skin/liver/oral GVHD inactive, and occular symptoms controlled/symptomatic improvement.   --- 10/10/2023 will hold belumosodil given recurrent infections ans significant improvement in symptoms with no end organ damage after discussions with him and his wife. Will optimize topical treatment with scleral lenses, eyedrops ans dex s/sp int he interim to avoid flares. Will readdress need to restart systemic treatment pending infectious resolution and symptoms.         5.  ID:   # Recent history of Epidural abscess: Followed by Dr. Candelario ID, plan to be on IV ceftriaxone for at least 6 weeks course through 5/11/2023-to be determined on further follow-up.  See imaging with MRI follow-up as above.   #Recurrent discitis/OM still on treatment through October 2023, cx negative, managed with ID locally.  3/30/2023 blood culture with Staph epidermidis  3/31/2023 BONE, L5, FLUOROSCOPICALLY-GUIDED NEEDLE BIOPSY: Benign bone with trilineage hematopoiesis (normal) Negative for neoplasm Negative for acute osteomyelitis. DISC, L5-S1, ASPIRATION FOR CYTOLOGY: Acellular debris; no cellular material present for evaluation     #History  of COVID x2 last 1/2023 and influenza    Treated with Paxlovid with persistent fatigue but no active respiratory symptoms  received his influenza annual vaccine as well as COVID boosters locally 11/16/2022     #History of pulmonary infiltrates:   4/20 CT chest with new RUL infiltrate. Highly immunocompromised. 4/22 Fungitell/asp GM were neg. BAL 4/29 NGTD, increased micafungin to 150mg IV daily-- f/u 6/1 Dr Garcia CT with mixed results, followed with Dr. Garcia August 2022 with resolution of pulmonary nodules and normalization of LFTs we will switch patient will switch patient to posaconazole prophylactic dose and monitor LFTs and any infectious concerns closely. Will continue till we determine durable discontinuation of IST given high risk history.      #History of CMV viremia:    - CMV viremia up to 1100. 4/15 started valcyte 900mg PO BID. 4/20 CMV <137, 5/10 ND, decreased to 450mg BID 4/30 - continue 4 weeks as long as CMV <137 till 5/28 + weekly CMV. Switched to acyclovir after completion of therapy 5/28/2022. recheck 3/1/23 ND     - PPx:   ACV, will start Shingrix vaccination series and discontinue in 2 months.  Patient developed oral herpes reactivation after stopping acyclovir therefore resumed on 1/9/2023  Posaconazole (previously on micafungin with LFts abl, hx of pulmonary nodules needign treatment dose). 11/14/2023 discontinue and check CT in 2 months given history completed December 2023 with no concerns for infections or nodular lesions.  Pentamidine, last 1/20/2024. Will schedule next dose 2/9/2024.  11/2023 Discontinue azithro 250mg daily (stopped levaquin due to possible tendonitis).  He is on Augmentin and doxycycline for discitis/osteomyelitis.  IgG1/2023 364, 4/2023 460      - EBV viremia: 4/20 CAP CT (w/ contrast): No adenopathy. S/p 4/22 and 5/4/22 rituximab 375mg/m2 5/10 ND x2, 6/7 ND, 8/18/2022 971, 3/28/22 843  S/p covid vaccine series 12/2021  S/p evusheld     - vaccinations: Previously  deferred annual vaccines in the setting of GVHD and immunosuppression therapy. 11/14/2023 we will start vaccination series, including Shingrix, COVID-vaccine. 1/9/2023 Shingrix vaccine today.  Needs RSV locally.  Will schedule for the rest of his boosters on 2/9 per his request.    12/2023:HSV oral lesions; PCR positive. Stopped ACV last month and lesions appears soon thereafter. Empirically Rx Valtrex. mild lingering URI sx; RVP + rhino (12/21); supportive cares. (CCT 12/1/23 neg).started on pred 40mg/d.  Completed Valtrex course and put back on acyclovir restarted 1/20/2024    6. Endo:   - Hx of graves disease; On synthroid follows with endocrine  - HLD: 11/2022 cholesterol 355, triglycerides 153, -- 11/8 started rosuvastatin 5 mg daily, 11/22 CPK within normal, 5/2/2023 repeat lipid profile cholesterol down to 202, triglycerides 191, LDL now normal at 95.  We will continue same dose of rosuvastatin and add fish oil 1g BID (on hold). Repeated August with PCP, who added back fish oil    7. CV:   - Hypertension history   - TTE most recently 10/2022     8. GI:   -Omeprazole for heartburn  -LFTs as above, now normal. Off ursodiol     9. Psych:   - Situational anxiety - lexapro 10mg daily.   - Insomnia: worse on steroids. Ativan, trazadone, melatonin     10. MSK:   -History of left food drop, PT. Occasional muscle cramps discussed optimizing vitamin D levels and considering vitamin D, some of the symptomatology of muscle cramps are likely related to chronic GVHD.  No muscle cramps reported today.  -4/2023 new tearing of the right acetabular labrum anterosuperiorly referred to to orthopedic surgery.       11. HCM/Age appropriate cancer screening:  -Discussed with the patient importance of age-appropriate cancer screening including colonoscopies, he is due for this.  -Discussed importance of at least once a year vitamin D level check, 8/18/2022 wnl  -Screening for secondary iron overload, see heme section  above  -Yearly TSH 2022 wnl; yearly lipid panel - see GI section above  -9/6/2022 DEXA scan Based on BMD diagnosis is consistent with normal bone density based on WHO criteria Ref. 1   -PFTs 9/20/22, see above. 9/21/2023 PFTs The FVC, FEV1, FEV1/FVC ratio and YME25-01% are within normal limits. FVC 99% (108), FEV1 91%, DLCO uncorrected 91%.  Repeat PFTs in 4 to 6 months.  -Metabolic other, 8/2022 testosterone within normal  -11/22/2022 discussed with the patient the challenges and the stresses of chronic illness and healthcare fatigue.  Patient had previously been on Lexapro that we restarted confirmed with pharmacy that there are no drug drug interactions.  Additionally we will referred patient to PMNR to help with physical rehab and strength to help with fatigue.  Our social workers will also reach out to Nik and his wife for further resources including support groups for patients with chronic illness and fatigue post transplant.  -Referral to palliative care for symptom and pain management including insomnia     Plan:  - 2/27/2024 decrease from 10/20mg prednisone to 10/15 then 10/10 doing down by 5mg weekly, he will message me weekly with updates   - March AND April pentamidine  -Shingles vaccines completed, to complete the rest when on lower dose steroids  -Continue off belumosodil  -Continue dexamethasone 2 times a day and monitor oral symptoms  - follow with nephrology, endocrinology, PCP, follow-up locally with infectious disease, neurosurgery and pain management for management of osteomyelitis and pain control. PCP follow up for lipid profile, age appropriate cancer screening  - RTC with me on 4/2/24      I spent 45 minutes in the care of this patient today, which included time necessary for preparation for the visit, obtaining history, ordering medications/tests/procedures as medically indicated, review of pertinent medical literature, counseling of the patient, communication of recommendations to the care  team, and documentation time.        Akshat Tobar MD

## 2024-02-28 ENCOUNTER — TELEPHONE (OUTPATIENT)
Dept: ENDOCRINOLOGY | Facility: CLINIC | Age: 64
End: 2024-02-28
Payer: MEDICARE

## 2024-02-28 LAB
CMV DNA SPEC NAA+PROBE-ACNC: NOT DETECTED IU/ML
EBV DNA COPIES/ML, INSTRUMENT: 1140 COPIES/ML
EBV DNA SPEC NAA+PROBE-LOG#: 3.1 {LOG_COPIES}/ML

## 2024-02-28 NOTE — TELEPHONE ENCOUNTER
----- Message from Natalia Gray MD sent at 2/28/2024  9:45 AM CST -----  Regarding: RE: Low testoesterone on mutual patient  Please tell Nik to schedule a follow up appt with me to discuss this test that I did not order (from [facility] ) . The lab was obtained 1 day prior to an ED visit with diagnosis SIRS.  It was noted he was complaining of ED, fatigue and reported he was not wearing his CPAP mask due to intolerance .  Illness and steroids both are expected to lower testosterone.  Untreated sleep apnea can lower testosterone.    Natalia Gray    ----- Message -----  From: Akshat Bearden MD  Sent: 2/28/2024   9:14 AM CST  To: Natalia Gray MD; Lashanda Varner RN  Subject: RE: Low testoesterone on mutual patient          His level was 134 in January.   per lab normal range for those >50 year old is 193.0-740.0   ng/dl   ----- Message -----  From: Natalia Gray MD  Sent: 2/27/2024   7:54 PM CST  To: Natalia Gray MD; Akshat Tobar MD; #  Subject: RE: Low testoesterone on mutual patient          He had normal testosterone that I see on the EMR.  Unclear why you are asking this ?       ----- Message -----  From: Lashanda Varner RN  Sent: 2/27/2024   2:44 PM CST  To: Natalia Gray MD; Akshat Tobar MD  Subject: RE: Low testoesterone on mutual patient          Attaching patient.  ----- Message -----  From: Akshat Bearden MD  Sent: 2/27/2024   2:26 PM CST  To: Natalia Gray MD; Lashanda Varner RN  Subject: Low testoesterone on mutual patient              Genaro Fisher,    I would appreciate your insigne on his testosterone and recommendations for replacement, reporting fatigue. FYI he is on prednisone 20/10 and will taper by 5mg every week.    Thank you  Akshat

## 2024-02-29 ENCOUNTER — OFFICE VISIT (OUTPATIENT)
Dept: OPHTHALMOLOGY | Facility: CLINIC | Age: 64
End: 2024-02-29
Attending: OPHTHALMOLOGY
Payer: MEDICARE

## 2024-02-29 DIAGNOSIS — Z96.1 PSEUDOPHAKIA, BOTH EYES: Primary | ICD-10-CM

## 2024-02-29 DIAGNOSIS — H16.123 FILAMENTARY KERATITIS OF BOTH EYES: ICD-10-CM

## 2024-02-29 DIAGNOSIS — T86.09 GVHD AS COMPLICATION OF BONE MARROW TRANSPLANT (H): ICD-10-CM

## 2024-02-29 DIAGNOSIS — D89.813 GVHD AS COMPLICATION OF BONE MARROW TRANSPLANT (H): ICD-10-CM

## 2024-02-29 PROCEDURE — 99024 POSTOP FOLLOW-UP VISIT: CPT | Performed by: OPHTHALMOLOGY

## 2024-02-29 PROCEDURE — G0463 HOSPITAL OUTPT CLINIC VISIT: HCPCS | Performed by: OPHTHALMOLOGY

## 2024-02-29 PROCEDURE — 92015 DETERMINE REFRACTIVE STATE: CPT | Mod: GY

## 2024-02-29 ASSESSMENT — REFRACTION_MANIFEST
OD_ADD: +2.50
OD_CYLINDER: SPHERE
OD_SPHERE: -0.75
OS_AXIS: 010
OS_SPHERE: -0.25
OS_ADD: +2.50
OS_CYLINDER: +0.50

## 2024-02-29 ASSESSMENT — CUP TO DISC RATIO
OS_RATIO: 0.45
OD_RATIO: 0.45

## 2024-02-29 ASSESSMENT — SLIT LAMP EXAM - LIDS
COMMENTS: 1+ BLEPH, THICKENED MARGINS, 1-2+ MGD
COMMENTS: 1+ BLEPH, THICKENED MARGINS, 1-2+ MGD

## 2024-02-29 ASSESSMENT — TONOMETRY
IOP_METHOD: TONOPEN
OD_IOP_MMHG: 10
OS_IOP_MMHG: 10

## 2024-02-29 ASSESSMENT — VISUAL ACUITY
OS_SC: 20/25
OD_PH_SC: 20/30
OD_SC: 20/30
OD_PH_SC+: +2
OD_SC+: -2
OS_SC+: +2
METHOD: SNELLEN - LINEAR

## 2024-02-29 ASSESSMENT — EXTERNAL EXAM - LEFT EYE: OS_EXAM: DERMATOCHALASIS

## 2024-02-29 ASSESSMENT — EXTERNAL EXAM - RIGHT EYE: OD_EXAM: DERMATOCHALASIS

## 2024-02-29 NOTE — NURSING NOTE
Chief Complaints and History of Present Illnesses   Patient presents with    Post Op (Ophthalmology) Both Eyes     Cataract extraction with IOL in the right eye on 02/05/2024   Cataract extraction with IOL in the left eye on 01/15/2024     Chief Complaint(s) and History of Present Illness(es)       Post Op (Ophthalmology) Both Eyes              Laterality: both eyes    Course: stable    Associated symptoms: dryness (when not wearing the contacts, worse in the mornings), eye pain (where the stitch is in the right eye), tearing and photophobia    Treatments tried: artificial tears    Pain scale: 2/10    Comments: Cataract extraction with IOL in the right eye on 02/05/2024   Cataract extraction with IOL in the left eye on 01/15/2024              Comments    He states that he is doing well with vision in both eyes since the procedures.  He can read street signs now!    He is using Refresh drops.    LLUVIA Dubois 10:17 AM  February 29, 2024

## 2024-02-29 NOTE — PROGRESS NOTES
Chief Complaint(s) and History of Present Illness(es)     Post Op (Ophthalmology) Both Eyes            Laterality: both eyes    Course: stable    Associated symptoms: dryness (when not wearing the contacts, worse in the   mornings), eye pain (where the stitch is in the right eye), tearing and   photophobia    Treatments tried: artificial tears    Pain scale: 2/10    Comments: Cataract extraction with IOL in the right eye on 02/05/2024   Cataract extraction with IOL in the left eye on 01/15/2024          Comments    He states that he is doing well with vision in both eyes since the   procedures.  He can read street signs now!    He is using Refresh, FML and Prednisolone acetate drops.    Jenniffer Gould, LLUVIA 10:17 AM  February 29, 2024         Review of systems for the eyes was negative other than the pertinent positives/negatives listed in the HPI.      Assessment & Plan      Nik Nava is a 63 year old male with the following diagnoses:   1. Pseudophakia, both eyes    2. 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 Filamentary keratitis of both eyes           Doing well, sutures removed today right eye   Ok to resume normal activities  Taper Predforte as directed  Continue FML as before   Preservative free artificial tears frequently  Scleral contact lens per Dr. JOHNSON       Patient disposition:   Ok to resume long term care with Dr. Juarez.  Clif as needed          Attending Physician Attestation:  Complete documentation of historical and exam elements from today's encounter can be found in the full encounter summary report (not reduplicated in this progress note).  I personally obtained the chief complaint(s) and history of present illness.  I confirmed and edited as necessary the review of systems, past medical/surgical history, family history, social history, and examination findings as documented by others; and I examined the patient myself.  I personally reviewed the relevant tests, images, and  reports as documented above.  I formulated and edited as necessary the assessment and plan and discussed the findings and management plan with the patient and family. . - Deshawn Menezes MD

## 2024-03-07 ENCOUNTER — TELEPHONE (OUTPATIENT)
Dept: ENDOCRINOLOGY | Facility: CLINIC | Age: 64
End: 2024-03-07
Payer: MEDICARE

## 2024-03-07 NOTE — TELEPHONE ENCOUNTER
[facility] Call Center    Phone Message    May a detailed message be left on voicemail: yes     Reason for Call: Other: Patient wife calling in regards to wondering about patients low testosterone as well scheduling. Patients wife is not willing to schedule first available and wondering if Dr. Gray wanted to see patient sooner then first available, thank you! Please call back to discuss.     Action Taken: Message routed to:  [facility] ([facility]    Travel Screening: Not Applicable

## 2024-03-08 NOTE — TELEPHONE ENCOUNTER
Notified that 11/24 was fine for follow up . He should use his CPAP   to help improve the testosterone levels in the mean time. My chart message sent and read. Bridgett Quintana RN on 3/8/2024 at 10:48 AM

## 2024-03-10 ENCOUNTER — TELEPHONE (OUTPATIENT)
Dept: OPHTHALMOLOGY | Facility: CLINIC | Age: 64
End: 2024-03-10
Payer: MEDICARE

## 2024-03-10 NOTE — TELEPHONE ENCOUNTER
Telephone Note    Patient is a 63-year-old M who presents with eye irritation and pain.     States that he woke up this morning and felt like there was something in the right eye. Wears scleral lenses    Patient's past ocular history:   - GVHD  OU  - Filamentary keratitis OU    Drops:   - FML BID OU  - Acetylcysteine drops 2-4x/day  - States that he has been flushing the eye out with saline all day    Patient's past medical history:   S/p bone marrow transplant    Discussed with the patient that we could see him in the emergency department tonight or else follow up early in the week with acute/comprehensive clinic. We decided that temporizing with the currently prescribed drops would likely be appropriate if close clinic follow up could be established, though I did advise that if symptoms crossed a threshold of concern that the patient felt they needed to be seen emergently that I would be happy to see them in the emergency department tonight.    Will message clinic schedulers for follow up Monday/Tuesday of this week.     Arturo Noland MD  PGY-2, Ophthalmology  [facility]  03/10/24

## 2024-03-11 ENCOUNTER — TELEPHONE (OUTPATIENT)
Dept: OPHTHALMOLOGY | Facility: CLINIC | Age: 64
End: 2024-03-11

## 2024-03-11 NOTE — TELEPHONE ENCOUNTER
I spoke to the patient to schedule him today.  The patent notes his symptoms are all gone with no vision change, redness or eye discomfort.  He did as the on call doctor had advised and used the Acetylcysteine.  Appointment made next week for follow up.  Patient will call if symptoms worsen.

## 2024-03-12 DIAGNOSIS — Z94.81 STATUS POST BONE MARROW TRANSPLANT (H): ICD-10-CM

## 2024-03-12 RX ORDER — PENTAMIDINE ISETHIONATE 300 MG/300MG
300 INHALANT RESPIRATORY (INHALATION)
Status: CANCELLED | OUTPATIENT
Start: 2024-03-12 | End: 2024-03-12

## 2024-03-12 RX ORDER — ALBUTEROL SULFATE 0.83 MG/ML
2.5 SOLUTION RESPIRATORY (INHALATION)
Status: CANCELLED | OUTPATIENT
Start: 2024-03-12 | End: 2024-03-12

## 2024-03-12 RX ORDER — CHLORTHALIDONE 25 MG/1
12.5 TABLET ORAL DAILY
Qty: 30 TABLET | Refills: 0 | Status: SHIPPED | OUTPATIENT
Start: 2024-03-12 | End: 2024-05-20

## 2024-03-13 ENCOUNTER — ONCOLOGY VISIT (OUTPATIENT)
Dept: TRANSPLANT | Facility: CLINIC | Age: 64
End: 2024-03-13
Attending: INTERNAL MEDICINE
Payer: MEDICARE

## 2024-03-13 ENCOUNTER — APPOINTMENT (OUTPATIENT)
Dept: LAB | Facility: CLINIC | Age: 64
End: 2024-03-13
Attending: INTERNAL MEDICINE
Payer: MEDICARE

## 2024-03-13 VITALS
RESPIRATION RATE: 16 BRPM | DIASTOLIC BLOOD PRESSURE: 81 MMHG | SYSTOLIC BLOOD PRESSURE: 136 MMHG | BODY MASS INDEX: 33.45 KG/M2 | WEIGHT: 253.5 LBS | TEMPERATURE: 97.6 F | HEART RATE: 88 BPM | OXYGEN SATURATION: 98 %

## 2024-03-13 DIAGNOSIS — T86.09 GVHD AS COMPLICATION OF BONE MARROW TRANSPLANT (H): Primary | ICD-10-CM

## 2024-03-13 DIAGNOSIS — D89.813 GVHD AS COMPLICATION OF BONE MARROW TRANSPLANT (H): Primary | ICD-10-CM

## 2024-03-13 LAB
ALBUMIN SERPL BCG-MCNC: 4.4 G/DL (ref 3.5–5.2)
ALP SERPL-CCNC: 88 U/L (ref 40–150)
ALT SERPL W P-5'-P-CCNC: 35 U/L (ref 0–70)
ANION GAP SERPL CALCULATED.3IONS-SCNC: 12 MMOL/L (ref 7–15)
AST SERPL W P-5'-P-CCNC: 34 U/L (ref 0–45)
BASOPHILS # BLD AUTO: 0 10E3/UL (ref 0–0.2)
BASOPHILS NFR BLD AUTO: 0 %
BILIRUB SERPL-MCNC: 0.7 MG/DL
BUN SERPL-MCNC: 33.2 MG/DL (ref 8–23)
CALCIUM SERPL-MCNC: 9.8 MG/DL (ref 8.8–10.2)
CHLORIDE SERPL-SCNC: 102 MMOL/L (ref 98–107)
CREAT SERPL-MCNC: 1.29 MG/DL (ref 0.67–1.17)
DEPRECATED HCO3 PLAS-SCNC: 26 MMOL/L (ref 22–29)
EGFRCR SERPLBLD CKD-EPI 2021: 62 ML/MIN/1.73M2
EOSINOPHIL # BLD AUTO: 0 10E3/UL (ref 0–0.7)
EOSINOPHIL NFR BLD AUTO: 0 %
ERYTHROCYTE [DISTWIDTH] IN BLOOD BY AUTOMATED COUNT: 16.3 % (ref 10–15)
GLUCOSE SERPL-MCNC: 115 MG/DL (ref 70–99)
HCT VFR BLD AUTO: 35.7 % (ref 40–53)
HGB BLD-MCNC: 12.1 G/DL (ref 13.3–17.7)
IMM GRANULOCYTES # BLD: 0.1 10E3/UL
IMM GRANULOCYTES NFR BLD: 1 %
LYMPHOCYTES # BLD AUTO: 3.3 10E3/UL (ref 0.8–5.3)
LYMPHOCYTES NFR BLD AUTO: 31 %
MCH RBC QN AUTO: 34.9 PG (ref 26.5–33)
MCHC RBC AUTO-ENTMCNC: 33.9 G/DL (ref 31.5–36.5)
MCV RBC AUTO: 103 FL (ref 78–100)
MONOCYTES # BLD AUTO: 0.7 10E3/UL (ref 0–1.3)
MONOCYTES NFR BLD AUTO: 7 %
NEUTROPHILS # BLD AUTO: 6.4 10E3/UL (ref 1.6–8.3)
NEUTROPHILS NFR BLD AUTO: 61 %
NRBC # BLD AUTO: 0 10E3/UL
NRBC BLD AUTO-RTO: 0 /100
PLATELET # BLD AUTO: 198 10E3/UL (ref 150–450)
POTASSIUM SERPL-SCNC: 4.4 MMOL/L (ref 3.4–5.3)
PROT SERPL-MCNC: 6.8 G/DL (ref 6.4–8.3)
RBC # BLD AUTO: 3.47 10E6/UL (ref 4.4–5.9)
SODIUM SERPL-SCNC: 140 MMOL/L (ref 135–145)
WBC # BLD AUTO: 10.5 10E3/UL (ref 4–11)

## 2024-03-13 PROCEDURE — 99215 OFFICE O/P EST HI 40 MIN: CPT | Mod: 24

## 2024-03-13 PROCEDURE — 87799 DETECT AGENT NOS DNA QUANT: CPT

## 2024-03-13 PROCEDURE — 87529 HSV DNA AMP PROBE: CPT | Mod: 59

## 2024-03-13 PROCEDURE — 250N000011 HC RX IP 250 OP 636: Performed by: INTERNAL MEDICINE

## 2024-03-13 PROCEDURE — 82784 ASSAY IGA/IGD/IGG/IGM EACH: CPT

## 2024-03-13 PROCEDURE — 36591 DRAW BLOOD OFF VENOUS DEVICE: CPT

## 2024-03-13 PROCEDURE — 85025 COMPLETE CBC W/AUTO DIFF WBC: CPT

## 2024-03-13 PROCEDURE — 84520 ASSAY OF UREA NITROGEN: CPT

## 2024-03-13 PROCEDURE — G0463 HOSPITAL OUTPT CLINIC VISIT: HCPCS

## 2024-03-13 RX ORDER — HEPARIN SODIUM (PORCINE) LOCK FLUSH IV SOLN 100 UNIT/ML 100 UNIT/ML
5 SOLUTION INTRAVENOUS DAILY PRN
Status: DISCONTINUED | OUTPATIENT
Start: 2024-03-13 | End: 2024-03-13 | Stop reason: HOSPADM

## 2024-03-13 RX ADMIN — Medication 5 ML: at 11:12

## 2024-03-13 ASSESSMENT — PAIN SCALES - GENERAL: PAINLEVEL: NO PAIN (0)

## 2024-03-13 NOTE — PROGRESS NOTES
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 Recent oral HSV and oral cGVHD flare treated with prednisone.     ADD ON VISIT for oral sores/pain.    HPI:    Started Friday with a \"sore\" in corner of mouth/lower lip then noted more crusting mainly on lower lip. Sore. Inside mouth and tongue not really involved. No skin or eye changes. No fevers. On pred 10mg and dex swish BID.    Review of Systems                                                                                                                                         10 point Review of systems was negative except as detailed above     PHYSICAL EXAM                                                                                                                                                   KPS: 70    Wt Readings from Last 4 Encounters:   03/13/24 115 kg (253 lb 8 oz)   02/27/24 113.4 kg (249 lb 14.4 oz)   02/20/24 111.6 kg (246 lb)   02/05/24 113.4 kg (250 lb)      General: NAD.  HEENT: sclera anicteric, injected conjunctivae.  +lower lip multiple yellow crusted areas. Buccal mucosa near lips some mild lichenoid changes and 1 yellowish ulcer on R buccal mucosa.   Skin: No evidence of active GVH  LE trace bilateral edema in lower extremities    cGVHD therapy started on February 24 2022  - Baseline NIH scoring 2/24/2022 : skin 2 maculopapular rash/erythema with no sclerotic features, mouth score 1 mild symptoms with lichenoid changes less than 25%, eyes score 2 moderate symptoms without new visual impairments due to KCS requiring lubricant eyedrops more than 3 times a day, overall mild scale for her provider  - 3/25/2022 1 month NIH treatment assessment on PQRST: skin 2, mouth score 1 mild symptoms with NO lichenoid changes, eyes score 2 moderate symptoms w requiring lubricant eyedrops more than 3 times a day, liver score 1 ALT 3.7x ULN and " nl bilirubin   - 3/31  Mouth clear; eyes minimal LFT still abn; no joint or new GI sx  - 4/28 Mouth clear, Left>R eye is mild sclera erythema, lids are pink and irritated appearing, LFT's slow improvement, no new joint, skin, or GI symptoms.   -5/11 mouth with mild erythema but no lichenoid changes, eyes using eyedrops 4-6 times a day score of 2, LFTs significantly improved from baseline slight elevation in alk phos and AST with normal bilirubin, skin with hyperpigmentation from old acute GVHD no active skin rashes, no GI symptoms no musculoskeletal complaints.  -5/26 Mouth with very slight lichenoid changes, erythema.  Eyes still using eyedrops 4-6x per day.  LFTs essentially normal with slight elevation in alk phos.  Skin with hyperpigmentation from old acute GVHD, no active rashes, no GI or MSK concerns.  Skin 0, mouth 0 but with lichenoid changes, eyes 2  -6/7/22 Mouth with no lichenoid changes, erythema.  Eyes still using eyedrops 4-6x per day.  LFTs essentially normal with slight elevation in alk phos.  Skin with hyperpigmentation from old acute GVHD, no active rashes, no GI or MSK concerns.  Skin 0, mouth 0, eyes 2     -6 month PQRST assessment 9/6/2022: eyes 3, mouth 0 for symptoms, 25% lichenoid changes (1), liver/GI/skin 0    11/8/2022, 11/22/2022, 12/13/22 cGVHD NIG scoring eyes 3, no other organ involvement clinically  3/28/23 cGVHD NIG scoring eyes 3, no other organ involvement clinically  5/2/23 cGVHD NIG scoring eyes 3, oral 1, no other organ involvement clinically  6/13/23 cGVHD NIG scoring eyes 3, oral 1, no other organ involvement clinically  8/15/23 cGVHD NIG scoring eyes 3 (down to 3-4 times eye drop from >10 but still using scleral lenses), oral 1, no other organ involvement clinically  10/10/2023 cGVHD NIG scoring eyes 2-3 (down to 3-4 times eye drop from >10 but still using scleral lenses), oral 1, no other organ involvement clinically  11/14/2023 cGVHD NIG scoring eyes 2-3 (down to 3-4 times  eye drop from >10 but still using scleral lenses), oral 1, no other organ involvement clinically  1/9/2024 cGVHD NIG scoring eyes 2-3 (down to 3-4 times eye drop from >10 but still using scleral lenses), oral 2, no other organ involvement clinically  2/27/2024 cGVHD NIG scoring eyes 2-3 (down to 3-4 times eye drop from >10 but still using scleral lenses), oral 1, no other organ involvement clinically    ASSESSMENT AND PLAN   Nik Nava is a 63 year old male with Ph Pos ALL, day 946 s/p sib allo stem cell transplant    1.   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   1/9/2/2024 cGVHD NIG scoring eyes 2-3, oral 1, no other organ involvement clinically   #Skin GVHD- history of biopsy proven GVHD of the skin 8/30/2021; resolved.   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 WNL today   # Ocular GVHD: follows with ophthalmology. Maxitrol to lids, continue refreshing drops QID. Score 3, but down to 3-4 times eye drops from >10/day. Not needing Sl all the time.  Scheduled for cataract surgeries.  Followed closely by Dr. 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  Lichenoid changes have largely resolved with no open ulcers since 11/8/2022, December 2023 for flare of oral GVHD in the setting of oral herpes reactivation    Previous therapies  Tacrolimus-previously decided to stay on same dose, no checks of levels if clinically stable, and no need switch to sirolimus. (keep tac low as gvhd is quiet and viremia). 5/10 level 45 with starting of COVID therapy and D-D intractions (Paxlovid for COVID19, which has a drug interaction with tacrolimus-Ritonavir increases serum levels of tacrolimus).   Tacrolimus level subtherapeutic, increase to 2.5 mg twice daily recently, 8/23/2022 instructed to change tacrolimus to 2 mg twice daily. 9/6 with mild NEGRA, hyperkalemia, hypomagnesemia, and reports some  tremors and cramps, suspect tacrolimus to be high therefore empirically decreased 1mg BID. Tac off.   Sirolimus  9/21 despite fluids and a dose adjustment of tacrolimus patient seem to have persistence NORBERTO related NEGRA, therefore after discussion with the patient decision was made to transition to sirolimus that was started on 9/21, patient initially seem to tolerate this well with normalization of kidney function  10/11 presented with flares of ocular and oral GVHD, fluid retention and gain of 5-6 kg, lower extremity edema, abdominal distention, total protein to creatinine ratio elevated at 0.4, in addition to elevation in BUN and creatinine, HTN.  Picture most consistent with sirolimus related side effects.  Less likely that the patient will tolerate even a lower level of sirolimus. Siro off     Corticosteroids  3/28/2023 - 5/2/2023- 8/15/2023 continue on 10/4 given adrenal insufficieny with recent am cortisol check and clinical symptoms on taper to lower dose of 4 mg daily  -12/2023 had oral GVHD flare started on Prednisone 40 mg,  start taper to 40 and 30 mg alternating for 1 week then 30 mg daily for 1 week, then 30 and 20 alternating for 1 week, then 20 for 1 week till he sees me.  - 2/27/2024 decrease from 10/20mg prednisone to 10/15 then 10/10 doing down by 5mg weekly  - 3/13/24 currently on 10mg daily.     Belumosudil  Patient intolerant/with disease flares on tacrolimus and sirolimus see above.  Discussed with the patient the different options for therapy of chronic GVHD that are FDA approved.  Given the side effect profiles after discussing in detail and given the least nephrotoxicity and expected response, taking into account other metabolic effect as well.  We will proceed with prior authorization with belumosudil.  Patient was given material to review for possible expected adverse events and side effects with both Belumosodil and ruxolitinib.   --- Started on 10/18/2022 - skin/liver/oral GVHD inactive,  and occular symptoms controlled/symptomatic improvement.   --- 10/10/2023 will hold belumosodil given recurrent infections ans significant improvement in symptoms with no end organ damage after discussions with him and his wife. Will optimize topical treatment with scleral lenses, eyedrops ans dex s/sp int he interim to avoid flares. Will readdress need to restart systemic treatment pending infectious resolution and symptoms.  - Belumosudil off.     3/13/24: Discussed with Lashanda RN coordinator/Dr. Avila Tobar. Not completely clear GVH flare mainly on lower lip vs HSV again. Will leave prednisone at 10mg. Continue dex BID as he has been doing. Will wait HSV PCR result. Lashanda will notify Dr. MORILLO of result and we will either give valtrex x 14 days and then decrease to suppressive dosing or if neg will likely start protopic ointment for lips. IGG, CMV, and EBV also pending.  Also discussed increasing hydration he has not been drinking as much fluid lately.     2.  ID:   # Recent history of Epidural abscess: Followed by Dr. Candelario ID, plan to be on IV ceftriaxone for at least 6 weeks course through 5/11/2023-to be determined on further follow-up.  See imaging with MRI follow-up as above.   #Recurrent discitis/OM still on treatment through October 2023, cx negative, managed with ID locally.  3/31/2023 BONE, L5, FLUOROSCOPICALLY-GUIDED NEEDLE BIOPSY: Benign bone with trilineage hematopoiesis (normal) Negative for neoplasm Negative for acute osteomyelitis. DISC, L5-S1, ASPIRATION FOR CYTOLOGY: Acellular debris; no cellular material present for evaluation     #History of COVID x2 last 1/2023 and influenza    Treated with Paxlovid with persistent fatigue but no active respiratory symptoms     #History of pulmonary infiltrates:   4/20 CT chest with new RUL infiltrate. Highly immunocompromised. 4/22 Fungitell/asp GM were neg. BAL 4/29 NGTD, increased micafungin to 150mg IV daily-- f/u 6/1 Dr Garcia CT with mixed results, followed  with Dr. Garcia August 2022 with resolution of pulmonary nodules and normalization of LFTs we will switch patient will switch patient to posaconazole prophylactic dose and monitor LFTs and any infectious concerns closely. Will continue till we determine durable discontinuation of IST given high risk history.      #History of CMV viremia:    - CMV viremia up to 1100. 4/15 started valcyte 900mg PO BID. 4/20 CMV <137, 5/10 ND, decreased to 450mg BID 4/30 - continue 4 weeks as long as CMV <137 till 5/28 + weekly CMV. Switched to acyclovir after completion of therapy 5/28/2022. CMV pending.     12/2023:HSV oral lesions; PCR positive. Stopped ACVand lesions appeared soon thereafter. Empirically Rx Valtrex. Completed Valtrex course and put back on acyclovir restarted 1/20/2024.    Posaconazole (previously on micafungin with LFts abl, hx of pulmonary nodules needign treatment dose). 11/14/2023 discontinue and check CT in 2 months given history completed December 2023 with no concerns for infections or nodular lesions.  Pentamidine: last 2/9. Scheduled 3/22  11/2023 Discontinue azithro 250mg daily (stopped levaquin due to possible tendonitis).  He is on Augmentin and doxycycline for discitis/osteomyelitis.  IgG1/2023 364, 4/2023 460. Pending today.      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 S/p 4/22 and 5/4/22 rituximab 375mg/m2. Pending.    - vaccinations: Previously deferred annual vaccines in the setting of GVHD and immunosuppression therapy. 11/14/2023 we will start vaccination series, including Shingrix, COVID-vaccine. 1/9/2023 Shingrix vaccine. Shingrix vaccines complete.  Needs RSV locally.  Rest of his boosters 2/9 per his request.    RTC   - 3/22 pentamidine/port flush  - optho follow-up scheduled as well.  - Dr. Avila Tobar 4/2/24      I spent 45 minutes in the care of this patient today, which included time necessary for preparation for the visit, obtaining history, ordering  medications/tests/procedures as medically indicated, review of pertinent medical literature, counseling of the patient, communication of recommendations to the care team, and documentation time.      Mayra Cosby PA-C  [phone]

## 2024-03-13 NOTE — NURSING NOTE
"Oncology Rooming Note    March 13, 2024 11:27 AM   Nik Nava is a 63 year old male who presents for:    Chief Complaint   Patient presents with    Blood Draw     Labs drawn via port by RN in lab. VS taken.     Oncology Clinic Visit     ALL, GVHD     Initial Vitals: /81   Pulse 88   Temp 97.6  F (36.4  C) (Oral)   Resp 16   Wt 115 kg (253 lb 8 oz)   SpO2 98%   BMI 33.45 kg/m   Estimated body mass index is 33.45 kg/m  as calculated from the following:    Height as of 2/5/24: 1.854 m (6' 1\").    Weight as of this encounter: 115 kg (253 lb 8 oz). Body surface area is 2.43 meters squared.  No Pain (0) Comment: Data Unavailable   No LMP for male patient.  Allergies reviewed: Yes  Medications reviewed: Yes    Medications: MEDICATION REFILLS NEEDED TODAY. Provider was notified.  Pharmacy name entered into [employer]:    [facility] - [place], [place] - [address] [facility] [place], [place] - [address] [place] [address] [phone]  [facility]  [facility] - [place], [place] - [address] [place] [address]    Frailty Screening:   Is the patient here for a new oncology consult visit in cancer care? 2. No      Clinical concerns: Refill Chlorthalidone 25 mg downstairs   Mayra was notified.      Pat Altman              "

## 2024-03-13 NOTE — LETTER
"    3/13/2024         RE: Nik Nava  [address] [facility] [id number]        Dear Colleague,    Thank you for referring your patient, Nik Nava, to the [facility] [place]. Please see a copy of my visit note below.      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 Recent oral HSV and oral cGVHD flare treated with prednisone.     ADD ON VISIT for oral sores/pain.    HPI:    Started Friday with a \"sore\" in corner of mouth/lower lip then noted more crusting mainly on lower lip. Sore. Inside mouth and tongue not really involved. No skin or eye changes. No fevers. On pred 10mg and dex swish BID.    Review of Systems                                                                                                                                         10 point Review of systems was negative except as detailed above     PHYSICAL EXAM                                                                                                                                                   KPS: 70    Wt Readings from Last 4 Encounters:   03/13/24 115 kg (253 lb 8 oz)   02/27/24 113.4 kg (249 lb 14.4 oz)   02/20/24 111.6 kg (246 lb)   02/05/24 113.4 kg (250 lb)      General: NAD.  HEENT: sclera anicteric, injected conjunctivae.  +lower lip multiple yellow crusted areas. Buccal mucosa near lips some mild lichenoid changes and 1 yellowish ulcer on R buccal mucosa.   Skin: No evidence of active GVH  LE trace bilateral edema in lower extremities    cGVHD therapy started on February 24 2022  - Baseline NIH scoring 2/24/2022 : skin 2 maculopapular rash/erythema with no sclerotic features, mouth score 1 mild symptoms with lichenoid changes less than 25%, eyes score 2 moderate symptoms without new visual impairments due to KCS requiring lubricant eyedrops more than 3 " times a day, overall mild scale for her provider  - 3/25/2022 1 month NIH treatment assessment on PQRST: skin 2, mouth score 1 mild symptoms with NO lichenoid changes, eyes score 2 moderate symptoms w requiring lubricant eyedrops more than 3 times a day, liver score 1 ALT 3.7x ULN and nl bilirubin   - 3/31  Mouth clear; eyes minimal LFT still abn; no joint or new GI sx  - 4/28 Mouth clear, Left>R eye is mild sclera erythema, lids are pink and irritated appearing, LFT's slow improvement, no new joint, skin, or GI symptoms.   -5/11 mouth with mild erythema but no lichenoid changes, eyes using eyedrops 4-6 times a day score of 2, LFTs significantly improved from baseline slight elevation in alk phos and AST with normal bilirubin, skin with hyperpigmentation from old acute GVHD no active skin rashes, no GI symptoms no musculoskeletal complaints.  -5/26 Mouth with very slight lichenoid changes, erythema.  Eyes still using eyedrops 4-6x per day.  LFTs essentially normal with slight elevation in alk phos.  Skin with hyperpigmentation from old acute GVHD, no active rashes, no GI or MSK concerns.  Skin 0, mouth 0 but with lichenoid changes, eyes 2  -6/7/22 Mouth with no lichenoid changes, erythema.  Eyes still using eyedrops 4-6x per day.  LFTs essentially normal with slight elevation in alk phos.  Skin with hyperpigmentation from old acute GVHD, no active rashes, no GI or MSK concerns.  Skin 0, mouth 0, eyes 2     -6 month PQRST assessment 9/6/2022: eyes 3, mouth 0 for symptoms, 25% lichenoid changes (1), liver/GI/skin 0    11/8/2022, 11/22/2022, 12/13/22 cGVHD NIG scoring eyes 3, no other organ involvement clinically  3/28/23 cGVHD NIG scoring eyes 3, no other organ involvement clinically  5/2/23 cGVHD NIG scoring eyes 3, oral 1, no other organ involvement clinically  6/13/23 cGVHD NIG scoring eyes 3, oral 1, no other organ involvement clinically  8/15/23 cGVHD NIG scoring eyes 3 (down to 3-4 times eye drop from >10 but  still using scleral lenses), oral 1, no other organ involvement clinically  10/10/2023 cGVHD NIG scoring eyes 2-3 (down to 3-4 times eye drop from >10 but still using scleral lenses), oral 1, no other organ involvement clinically  11/14/2023 cGVHD NIG scoring eyes 2-3 (down to 3-4 times eye drop from >10 but still using scleral lenses), oral 1, no other organ involvement clinically  1/9/2024 cGVHD NIG scoring eyes 2-3 (down to 3-4 times eye drop from >10 but still using scleral lenses), oral 2, no other organ involvement clinically  2/27/2024 cGVHD NIG scoring eyes 2-3 (down to 3-4 times eye drop from >10 but still using scleral lenses), oral 1, no other organ involvement clinically    ASSESSMENT AND PLAN   Nik Nava is a 63 year old male with Ph Pos ALL, day 946 s/p sib allo stem cell transplant    1.   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   1/9/2/2024 cGVHD NIG scoring eyes 2-3, oral 1, no other organ involvement clinically   #Skin GVHD- history of biopsy proven GVHD of the skin 8/30/2021; resolved.   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 WNL today   # Ocular GVHD: follows with ophthalmology. Maxitrol to lids, continue refreshing drops QID. Score 3, but down to 3-4 times eye drops from >10/day. Not needing Sl all the time.  Scheduled for cataract surgeries.  Followed closely by Dr. 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  Lichenoid changes have largely resolved with no open ulcers since 11/8/2022, December 2023 for flare of oral GVHD in the setting of oral herpes reactivation    Previous therapies  Tacrolimus-previously decided to stay on same dose, no checks of levels if clinically stable, and no need switch to sirolimus. (keep tac low as gvhd is quiet and viremia). 5/10 level 45 with starting of COVID therapy and D-D intractions (Paxlovid for COVID19, which has a  drug interaction with tacrolimus-Ritonavir increases serum levels of tacrolimus).   Tacrolimus level subtherapeutic, increase to 2.5 mg twice daily recently, 8/23/2022 instructed to change tacrolimus to 2 mg twice daily. 9/6 with mild NEGRA, hyperkalemia, hypomagnesemia, and reports some tremors and cramps, suspect tacrolimus to be high therefore empirically decreased 1mg BID. Tac off.   Sirolimus  9/21 despite fluids and a dose adjustment of tacrolimus patient seem to have persistence NORBERTO related NEGRA, therefore after discussion with the patient decision was made to transition to sirolimus that was started on 9/21, patient initially seem to tolerate this well with normalization of kidney function  10/11 presented with flares of ocular and oral GVHD, fluid retention and gain of 5-6 kg, lower extremity edema, abdominal distention, total protein to creatinine ratio elevated at 0.4, in addition to elevation in BUN and creatinine, HTN.  Picture most consistent with sirolimus related side effects.  Less likely that the patient will tolerate even a lower level of sirolimus. Siro off     Corticosteroids  3/28/2023 - 5/2/2023- 8/15/2023 continue on 10/4 given adrenal insufficieny with recent am cortisol check and clinical symptoms on taper to lower dose of 4 mg daily  -12/2023 had oral GVHD flare started on Prednisone 40 mg,  start taper to 40 and 30 mg alternating for 1 week then 30 mg daily for 1 week, then 30 and 20 alternating for 1 week, then 20 for 1 week till he sees me.  - 2/27/2024 decrease from 10/20mg prednisone to 10/15 then 10/10 doing down by 5mg weekly  - 3/13/24 currently on 10mg daily.     Belumosudil  Patient intolerant/with disease flares on tacrolimus and sirolimus see above.  Discussed with the patient the different options for therapy of chronic GVHD that are FDA approved.  Given the side effect profiles after discussing in detail and given the least nephrotoxicity and expected response, taking into  account other metabolic effect as well.  We will proceed with prior authorization with belumosudil.  Patient was given material to review for possible expected adverse events and side effects with both Belumosodil and ruxolitinib.   --- Started on 10/18/2022 - skin/liver/oral GVHD inactive, and occular symptoms controlled/symptomatic improvement.   --- 10/10/2023 will hold belumosodil given recurrent infections ans significant improvement in symptoms with no end organ damage after discussions with him and his wife. Will optimize topical treatment with scleral lenses, eyedrops ans dex s/sp int he interim to avoid flares. Will readdress need to restart systemic treatment pending infectious resolution and symptoms.  - Belumosudil off.     3/13/24: Discussed with Lashanda NUNEZ coordinator/Dr. Avila Tobar. Not completely clear GVH flare mainly on lower lip vs HSV again. Will leave prednisone at 10mg. Continue dex BID as he has been doing. Will wait HSV PCR result. Lashanda will notify Dr. MORILLO of result and we will either give valtrex x 14 days and then decrease to suppressive dosing or if neg will likely start protopic ointment for lips. IGG, CMV, and EBV also pending.  Also discussed increasing hydration he has not been drinking as much fluid lately.     2.  ID:   # Recent history of Epidural abscess: Followed by Dr. Candelario ID, plan to be on IV ceftriaxone for at least 6 weeks course through 5/11/2023-to be determined on further follow-up.  See imaging with MRI follow-up as above.   #Recurrent discitis/OM still on treatment through October 2023, cx negative, managed with ID locally.  3/31/2023 BONE, L5, FLUOROSCOPICALLY-GUIDED NEEDLE BIOPSY: Benign bone with trilineage hematopoiesis (normal) Negative for neoplasm Negative for acute osteomyelitis. DISC, L5-S1, ASPIRATION FOR CYTOLOGY: Acellular debris; no cellular material present for evaluation     #History of COVID x2 last 1/2023 and influenza    Treated with Paxlovid with  persistent fatigue but no active respiratory symptoms     #History of pulmonary infiltrates:   4/20 CT chest with new RUL infiltrate. Highly immunocompromised. 4/22 Fungitell/asp GM were neg. BAL 4/29 NGTD, increased micafungin to 150mg IV daily-- f/u 6/1 Dr Garcia CT with mixed results, followed with Dr. Garcia August 2022 with resolution of pulmonary nodules and normalization of LFTs we will switch patient will switch patient to posaconazole prophylactic dose and monitor LFTs and any infectious concerns closely. Will continue till we determine durable discontinuation of IST given high risk history.      #History of CMV viremia:    - CMV viremia up to 1100. 4/15 started valcyte 900mg PO BID. 4/20 CMV <137, 5/10 ND, decreased to 450mg BID 4/30 - continue 4 weeks as long as CMV <137 till 5/28 + weekly CMV. Switched to acyclovir after completion of therapy 5/28/2022. CMV pending.     12/2023:HSV oral lesions; PCR positive. Stopped ACVand lesions appeared soon thereafter. Empirically Rx Valtrex. Completed Valtrex course and put back on acyclovir restarted 1/20/2024.    Posaconazole (previously on micafungin with LFts abl, hx of pulmonary nodules needign treatment dose). 11/14/2023 discontinue and check CT in 2 months given history completed December 2023 with no concerns for infections or nodular lesions.  Pentamidine: last 2/9. Scheduled 3/22  11/2023 Discontinue azithro 250mg daily (stopped levaquin due to possible tendonitis).  He is on Augmentin and doxycycline for discitis/osteomyelitis.  IgG1/2023 364, 4/2023 460. Pending today.      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 S/p 4/22 and 5/4/22 rituximab 375mg/m2. Pending.    - vaccinations: Previously deferred annual vaccines in the setting of GVHD and immunosuppression therapy. 11/14/2023 we will start vaccination series, including Shingrix, COVID-vaccine. 1/9/2023 Shingrix vaccine. Shingrix vaccines complete.  Needs RSV locally.  Rest of his  boosters 2/9 per his request.    RTC   - 3/22 pentamidine/port flush  - optho follow-up scheduled as well.  - Dr. Avila Tobar 4/2/24      I spent 45 minutes in the care of this patient today, which included time necessary for preparation for the visit, obtaining history, ordering medications/tests/procedures as medically indicated, review of pertinent medical literature, counseling of the patient, communication of recommendations to the care team, and documentation time.      Mayra Cosby PA-C  [phone]

## 2024-03-13 NOTE — NURSING NOTE
Chief Complaint   Patient presents with    Blood Draw     Labs drawn via port by RN in lab. VS taken.      Labs drawn via Port accessed using 20g flat needle. Line flushed and Heparin locked. Vital signs taken. Checked into next appointment.     Emiliana Weinstein RN

## 2024-03-14 DIAGNOSIS — T86.09 GVHD AS COMPLICATION OF BONE MARROW TRANSPLANT (H): Primary | ICD-10-CM

## 2024-03-14 DIAGNOSIS — D89.813 GVHD AS COMPLICATION OF BONE MARROW TRANSPLANT (H): Primary | ICD-10-CM

## 2024-03-14 LAB
CMV DNA SPEC NAA+PROBE-ACNC: NOT DETECTED IU/ML
EBV DNA COPIES/ML, INSTRUMENT: 517 COPIES/ML
EBV DNA SPEC NAA+PROBE-LOG#: 2.7 {LOG_COPIES}/ML
HSV1 DNA SPEC QL NAA+PROBE: NOT DETECTED
HSV2 DNA SPEC QL NAA+PROBE: NOT DETECTED
IGG SERPL-MCNC: 375 MG/DL (ref 610–1616)

## 2024-03-14 RX ORDER — TACROLIMUS 1 MG/G
OINTMENT TOPICAL 2 TIMES DAILY
Qty: 30 G | Refills: 1 | Status: SHIPPED | OUTPATIENT
Start: 2024-03-14

## 2024-03-14 NOTE — PROGRESS NOTES
Nik HSV negative, discussed with Dr. Avila Tobar and she recommended he increase prednisone to 15mg. Sent tacrolimus ointment RX to apply to lips/mouth. Updated wife Lamar and encouraged to reach out to ophthalmology about if he should be seen sooner for eye symptoms. Agreeable to plan and will reach out with any other symptoms.

## 2024-03-22 ENCOUNTER — OFFICE VISIT (OUTPATIENT)
Dept: OPHTHALMOLOGY | Facility: CLINIC | Age: 64
End: 2024-03-22
Attending: STUDENT IN AN ORGANIZED HEALTH CARE EDUCATION/TRAINING PROGRAM
Payer: MEDICARE

## 2024-03-22 DIAGNOSIS — T86.09 GVHD AS COMPLICATION OF BONE MARROW TRANSPLANT (H): ICD-10-CM

## 2024-03-22 DIAGNOSIS — D89.813 GVHD AS COMPLICATION OF BONE MARROW TRANSPLANT (H): ICD-10-CM

## 2024-03-22 DIAGNOSIS — H16.123 FILAMENTARY KERATITIS OF BOTH EYES: Primary | ICD-10-CM

## 2024-03-22 DIAGNOSIS — Z96.1 PSEUDOPHAKIA, BOTH EYES: ICD-10-CM

## 2024-03-22 DIAGNOSIS — C91.01 ACUTE LYMPHOBLASTIC LEUKEMIA (ALL) IN REMISSION (H): Primary | ICD-10-CM

## 2024-03-22 PROCEDURE — G0463 HOSPITAL OUTPT CLINIC VISIT: HCPCS

## 2024-03-22 PROCEDURE — 94640 AIRWAY INHALATION TREATMENT: CPT | Performed by: INTERNAL MEDICINE

## 2024-03-22 PROCEDURE — 99213 OFFICE O/P EST LOW 20 MIN: CPT | Mod: GC | Performed by: OPHTHALMOLOGY

## 2024-03-22 PROCEDURE — 94642 AEROSOL INHALATION TREATMENT: CPT | Performed by: INTERNAL MEDICINE

## 2024-03-22 RX ORDER — PENTAMIDINE ISETHIONATE 300 MG/300MG
300 INHALANT RESPIRATORY (INHALATION)
Status: CANCELLED | OUTPATIENT
Start: 2024-03-22 | End: 2024-03-22

## 2024-03-22 RX ORDER — ALBUTEROL SULFATE 0.83 MG/ML
2.5 SOLUTION RESPIRATORY (INHALATION)
Status: COMPLETED | OUTPATIENT
Start: 2024-03-22 | End: 2024-03-22

## 2024-03-22 RX ORDER — PENTAMIDINE ISETHIONATE 300 MG/300MG
300 INHALANT RESPIRATORY (INHALATION)
Status: COMPLETED | OUTPATIENT
Start: 2024-03-22 | End: 2024-03-22

## 2024-03-22 RX ORDER — ALBUTEROL SULFATE 0.83 MG/ML
2.5 SOLUTION RESPIRATORY (INHALATION)
Status: CANCELLED | OUTPATIENT
Start: 2024-03-22 | End: 2024-03-22

## 2024-03-22 RX ADMIN — ALBUTEROL SULFATE 2.5 MG: 0.83 SOLUTION RESPIRATORY (INHALATION) at 11:37

## 2024-03-22 RX ADMIN — PENTAMIDINE ISETHIONATE 300 MG: 300 INHALANT RESPIRATORY (INHALATION) at 11:43

## 2024-03-22 ASSESSMENT — VISUAL ACUITY
OD_SC: 20/20
OS_SC: 20/25
OS_SC+: -2
METHOD: SNELLEN - LINEAR

## 2024-03-22 ASSESSMENT — CONF VISUAL FIELD
OD_INFERIOR_NASAL_RESTRICTION: 0
OD_INFERIOR_TEMPORAL_RESTRICTION: 0
OS_SUPERIOR_NASAL_RESTRICTION: 0
OD_SUPERIOR_NASAL_RESTRICTION: 0
OS_INFERIOR_NASAL_RESTRICTION: 0
OS_INFERIOR_TEMPORAL_RESTRICTION: 0
OD_NORMAL: 1
OD_SUPERIOR_TEMPORAL_RESTRICTION: 0
OS_SUPERIOR_TEMPORAL_RESTRICTION: 0
OS_NORMAL: 1

## 2024-03-22 ASSESSMENT — EXTERNAL EXAM - RIGHT EYE: OD_EXAM: DERMATOCHALASIS

## 2024-03-22 ASSESSMENT — TONOMETRY
OD_IOP_MMHG: 09
OS_IOP_MMHG: 10
IOP_METHOD: TONOPEN

## 2024-03-22 ASSESSMENT — SLIT LAMP EXAM - LIDS
COMMENTS: 1+ BLEPH, THICKENED MARGINS, 1-2+ MGD
COMMENTS: 1+ BLEPH, THICKENED MARGINS, 1-2+ MGD

## 2024-03-22 ASSESSMENT — EXTERNAL EXAM - LEFT EYE: OS_EXAM: DERMATOCHALASIS

## 2024-03-22 NOTE — PROGRESS NOTES
Nik Nava was seen today for a Pentamidine nebulizer tx ordered by Dr. Akshat Tobar.    Patient was first given 2.5 mg / 3 ml of albuterol (LOT# [id number],NDC# [id number], EXP 08/31/2025 ) nebulizer, after which Pentamidine 300 mg (Lot # [id number], NDC# [id number], EXP 04/2025) mixed with 6cc Sterile H20 was administered through a filtered nebulizer.    Pre-treatment: SpO2 = 96% HR = 78 bpm    BBS =  clear  Post-treatment: SpO2 = 97%  HR = 89 bpm   BBS = clear    No adverse side effects noted by the patient.    This service today was provided under the supervision of Dr. Kvng San, who was available if needed.     Procedure was completed by Ayan Clarke, RRT

## 2024-03-22 NOTE — NURSING NOTE
Chief Complaints and History of Present Illnesses   Patient presents with    Foreign Body Right Eye     Had FB sensation,redness and pain for several days  Seems resolved per pt  Refresh tid BE  Pres Free for sclera lenses  Jaida Roxy MCFADDEN 9:38 AM March 22, 2024        Chief Complaint(s) and History of Present Illness(es)       Foreign Body Right Eye              Laterality: right eye    Duration: 2 weeks    Associated symptoms: eye pain, redness and tearing    Comments: Had FB sensation,redness and pain for several days  Seems resolved per pt  Refresh tid BE  Pres Free for sclera lenses  Jaida MCFADDEN 9:38 AM March 22, 2024

## 2024-03-22 NOTE — PATIENT INSTRUCTIONS
- Increase lubrication regimen: AT ointment both eyes at night, preservative free artificial tears four times a day  up to hourly as needed, continue scleral lens use, start warm compresses 5 mins x 3 times per day  - Currently taking acetylcysteine drops four times a day OU; taper 2x/day for about a month, then stop  - Discussed punctal cautery r/b/a/ today (03/22/24)  - Will plan to follow up in 2 weeks as scheduled with Larry

## 2024-03-22 NOTE — PROGRESS NOTES
OPHTHALMOLOGY ACUTE CLINIC NOTE  03/22/24      Subjective:    HPI:     HPI       Foreign Body Right Eye    In right eye.  Duration of 2 weeks.  Associated symptoms include eye pain, redness and tearing. Additional comments: Had FB sensation,redness and pain for several days  Seems resolved per pt  Refresh tid BE  Pres Free for sclera lenses  Jaida Ramsey COA 9:38 AM March 22, 2024             Last edited by Jaida Ramsey on 3/22/2024  9:38 AM.        Denies change in vision, double vision, flashes, floaters, curtain-like defects, and pain with eye movements.     PMH:   Past Medical History:   Diagnosis Date    ALL (acute lymphocytic leukemia) (H)     CKD (chronic kidney disease)     GVHD (graft versus host disease) (H)     H/O peripheral stem cell transplant (H)     Hyperthyroidism 1996    Infection due to 2019 novel coronavirus 01/16/2023    Influenza A 11/2022    Postablative hypothyroidism 1997    Pulmonary embolism (H)     Renal cell carcinoma (H) 2007    right kidney    Sleep apnea        FH: None glaucoma or AMD.     Review of systems for the eyes was negative other than the pertinent positives/negatives listed in the HPI.      Objective:    Imaging: None    Assessment & Plan      Nik Nava is a 63 year old male who presents for    Dry Eye OU  GVHD OU  -Sees Annie  - s/p BMT 08/2021. Dryness right eye>left eye, symptomatic for photophobia/tearing  -Failed: Restasis/Xiidra (stinging), plugs (scarred puncta), BCL (retention)  -Chronic oral steroid use due to above  -Excellent response to scleral lens which has faded over time, currently with signficant PEE OU  -Wife helping with I&R. Pt on oral steroids causing hands to shake, unable to do himself.  -Right lens now needs refit after surgery. 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  - Acetylcysteine drops since 01/2024  - Flare about one week ago (~3/15/24) that resolved with  re-initiation of NAC drops  - Follow up 03/22/24 showing 1 filament in the LEFT eye, which was easily removed by cotton tipped applicator    Plan:  - Increase lubrication regimen: AT ointment both eyes at night, preservative free artificial tears four times a day  up to hourly as needed, continue scleral lens use, start warm compresses 5 mins x 3 times per day  - Currently taking acetylcysteine drops four times a day OU; taper 2x/day for about a month, then stop  - Discussed punctal cautery r/b/a/ today (03/22/24)  - Will plan to follow up in 2 weeks as scheduled with Larry and discuss cautery at that time    Patient disposition:   No follow-ups on file.    Patient seen with Dr. Aldo Noland MD  PGY-2, Ophthalmology  [facility]  03/22/24 10:45 AM    Attending Physician Attestation:  Complete documentation of historical and exam elements from today's encounter can be found in the full encounter summary report (not reduplicated in this progress note).  I personally obtained the chief complaint(s) and history of present illness.  I confirmed and edited as necessary the review of systems, past medical/surgical history, family history, social history, and examination findings as documented by others; and I examined the patient myself.  I personally reviewed the relevant tests, images, and reports as documented above.  I formulated and edited as necessary the assessment and plan and discussed the findings and management plan with the patient and family. - Madelyn Carlson MD

## 2024-03-26 DIAGNOSIS — T86.09 GVHD AS COMPLICATION OF BONE MARROW TRANSPLANT (H): Primary | ICD-10-CM

## 2024-03-26 DIAGNOSIS — D89.813 GVHD AS COMPLICATION OF BONE MARROW TRANSPLANT (H): Primary | ICD-10-CM

## 2024-04-01 DIAGNOSIS — T86.09 GVHD AS COMPLICATION OF BONE MARROW TRANSPLANT (H): Primary | ICD-10-CM

## 2024-04-01 DIAGNOSIS — D89.813 GVHD AS COMPLICATION OF BONE MARROW TRANSPLANT (H): Primary | ICD-10-CM

## 2024-04-01 NOTE — PROGRESS NOTES
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 Recent oral HSV and oral cGVHD flare treated with prednisone.  Feb 2024 Bronchitis.  Discharged last week from local admission for RSV PNA.       HPI:    Last week admitted [id number] for RSV PNA started on ribavirin to be completed today, stil with SOB with minimal exertion, mild rhinorrhea, cough persistent occasionally productive. Overall feel mentally and physically down still recovering from this. But Mouth overall good, no ulcers, eating well, no oral GVHD flares with current illness, doing dexamethasone swish and spit 2 times a day. Vision better. Using eyedrops 2 times a day only down from more than 10 times per day earlier in the summer, using scleral lenses- made a big difference for him. No chest pain.  No nausea vomiting or diarrhea.  No bleeding and no headaches.    Review of Systems                                                                                                                                         10 point Review of systems was negative except as detailed above     PHYSICAL EXAM                                                                                                                                                   KPS: 70    Wt Readings from Last 4 Encounters:   03/13/24 115 kg (253 lb 8 oz)   02/27/24 113.4 kg (249 lb 14.4 oz)   02/20/24 111.6 kg (246 lb)   02/05/24 113.4 kg (250 lb)      General: NAD.  HEENT: sclera anicteric, injected conjunctivae.    11/14/2023 No noted lichenoid changes in the oral mucosa previous prominent area of healed ulcers in the right/left mid buccal mucosa are less prominent with overall improved appearence and more normal appearing mucosal tissue, no ulcers seen.    1/9/2024 mild lichenoid changes and healing ulcers on both buccal mucosa no open discolored lesions noted  2/27/2024  no  lichenoid changes, healing ulcers/minimal scaring on both buccal mucosa  4/2/2024  no lichenoid changes, healing ulcers/minimal scaring on both buccal mucosa  Lungs:  CTA b/l  no wheezes  CV: RRR  no gallop  Abd; soft, NABS, protuberant  Ext/ Skin: Skin bruising on bilateral forearms,  fainting of previous hyperpigmented areas of previously known cGVHD  LE trace bilateral edema in lower extremities- wearing compression socks    cGVHD therapy started on February 24 2022  - Baseline NIH scoring 2/24/2022 : skin 2 maculopapular rash/erythema with no sclerotic features, mouth score 1 mild symptoms with lichenoid changes less than 25%, eyes score 2 moderate symptoms without new visual impairments due to KCS requiring lubricant eyedrops more than 3 times a day, overall mild scale for her provider  - 3/25/2022 1 month NIH treatment assessment on PQRST: skin 2, mouth score 1 mild symptoms with NO lichenoid changes, eyes score 2 moderate symptoms w requiring lubricant eyedrops more than 3 times a day, liver score 1 ALT 3.7x ULN and nl bilirubin   - 3/31  Mouth clear; eyes minimal LFT still abn; no joint or new GI sx  - 4/28 Mouth clear, Left>R eye is mild sclera erythema, lids are pink and irritated appearing, LFT's slow improvement, no new joint, skin, or GI symptoms.   -5/11 mouth with mild erythema but no lichenoid changes, eyes using eyedrops 4-6 times a day score of 2, LFTs significantly improved from baseline slight elevation in alk phos and AST with normal bilirubin, skin with hyperpigmentation from old acute GVHD no active skin rashes, no GI symptoms no musculoskeletal complaints.  -5/26 Mouth with very slight lichenoid changes, erythema.  Eyes still using eyedrops 4-6x per day.  LFTs essentially normal with slight elevation in alk phos.  Skin with hyperpigmentation from old acute GVHD, no active rashes, no GI or MSK concerns.  Skin 0, mouth 0 but with lichenoid changes, eyes 2  -6/7/22 Mouth with no lichenoid  changes, erythema.  Eyes still using eyedrops 4-6x per day.  LFTs essentially normal with slight elevation in alk phos.  Skin with hyperpigmentation from old acute GVHD, no active rashes, no GI or MSK concerns.  Skin 0, mouth 0, eyes 2     -6 month PQRST assessment 9/6/2022: eyes 3, mouth 0 for symptoms, 25% lichenoid changes (1), liver/GI/skin 0    11/8/2022, 11/22/2022, 12/13/22 cGVHD NIG scoring eyes 3, no other organ involvement clinically  3/28/23 cGVHD NIG scoring eyes 3, no other organ involvement clinically  5/2/23 cGVHD NIG scoring eyes 3, oral 1, no other organ involvement clinically  6/13/23 cGVHD NIG scoring eyes 3, oral 1, no other organ involvement clinically  8/15/23 cGVHD NIG scoring eyes 3 (down to 3-4 times eye drop from >10 but still using scleral lenses), oral 1, no other organ involvement clinically  10/10/2023 cGVHD NIG scoring eyes 3 (down to 3-4 times eye drop from >10 but still using scleral lenses), oral 1, no other organ involvement clinically  11/14/2023 cGVHD NIG scoring eyes 3 (down to 3-4 times eye drop from >10 but still using scleral lenses), oral 1, no other organ involvement clinically  1/9/2024 cGVHD NIG scoring eyes 3 (down to 3-4 times eye drop from >10 but still using scleral lenses), oral 2, no other organ involvement clinically  2/27/2024 cGVHD NIG scoring eyes 3 (down to 3-4 times eye drop from >10 but still using scleral lenses), oral 1, no other organ involvement clinically  4/22024 cGVHD NIG scoring eyes 3 (down to 2 times eye drop from >10 but still using scleral lenses), oral 1, no other organ involvement clinically    Lab:    Lab Results   Component Value Date    WBC 10.5 03/13/2024    ANEU 3.5 10/26/2021    HGB 12.1 (L) 03/13/2024    HCT 35.7 (L) 03/13/2024     03/13/2024     03/13/2024    POTASSIUM 4.4 03/13/2024    CHLORIDE 102 03/13/2024    CO2 26 03/13/2024     (H) 03/13/2024    BUN 33.2 (H) 03/13/2024    CR 1.29 (H) 03/13/2024    MAG 2.0  11/14/2023    INR 0.90 12/01/2022    BILITOTAL 0.7 03/13/2024    AST 34 03/13/2024    ALT 35 03/13/2024    ALKPHOS 88 03/13/2024    PROTTOTAL 6.8 03/13/2024    ALBUMIN 4.4 03/13/2024 4/28/2023    MRI Lumbar spine  1.  The previously seen ventral epidural abscess has resolved with small amount of residual ventral epidural phlegmon.   2.  Marrow edema about the L5-S1 endplates has mildly worsened within new rim-enhancing subchondral lesion in the left S1 superior endplate. This could reflect progression of osteomyelitis.      MRI hip right  1.  No evidence of septic arthritis of the right hip joint.   2.  There is degeneration and likely tearing of the right acetabular labrum anterosuperiorly, and probably low-grade chondromalacia of the right hip joint.   3.  Abnormality at L5-S1 compatible with known discitis-osteomyelitis.    ASSESSMENT AND PLAN   Nik Nava is a 63 year old male with Ph Pos ALL, day 965 s/p sib allo stem cell transplant    Day -6 (8/4): flu/cy  Day -5 through day -2 (8/5-8/8): flu  Day -1 (8/9): TBI  Day 0 (8/10): transplant     1.  Acute lymphoblastic leukemia, [place] chromosome positive in CR, MRD neg. S/p allo sib PBSCT. (ABO matched)  HCT-CI score: 3 (prior solid tumor)  Day +100 bone marrow biopsy is 100% donor with no morphologic or flow cytometric evidence of leukemia BCR abl is pending.  - Day +180 restaging BM bx- still in CR  100% donor;  2/16/22 BCR ABL major breakpoint undetectable.   - 1 year restaging 8/18/2022: Markedly hypocellular bone marrow [less than 10% cellular] with maturing trilineage hematopoiesis and no definite morphologic or immunophenotypic evidence for involvement by B lymphoblastic leukemia. BCRABL1 PCR not detected, bone marrow % donor. FISH NORMAL No evidence of BCR-ABL1 fusion  - Maintenance with TKI posttransplantation given his Ph positive ALL that have not been started previously presumed secondary to multiple active issues specifically  infections and COVID.  Per Avila Tobar: will reconsider this patient will think about whether this is something he would like to consider he was previously on Dasatinib, we would start on a low dose and increase as tolerated.  This is on hold now pending active GVHD therapy, we discussed on this visit 10/18 in agreement with holding off.  - 2 year restaging 8/7/2023 BMB: - No morphologic or immunophenotypic evidence of recurrent/persistent B-lymphoblastic leukemia. Slightly hypocellular marrow (10-20% estimated cellularity, patchy and variable), with trilineage hematopoetiic maturation and less than 2% blasts. Peripheral blood showing slight normocytic normochromic anemia and slight thrombocytopenia. BM % donor. FISH No evidence of BCR::ABL1 fusion /CG No recipient (male) cells  or cells with a t(9;22) or other clonal chromosomal abnormality were  detected among the 20 metaphases analyzed.      2.  HEME: CBC stable.   3/2023 iron low 28, iron saturation index 10%, transferrin 225, ferritin 1381 down trending (in retrospect that was the time of epidural abscess as well).  B12, folate normal.  High copper and zinc borderline low, 5/2023 started zinc.  Started iron replacement in 4/2023, recheck iron profile on follow up more consistent with AOCD, Iron 11/14/23 WNL. Note ferritin elevation is in the setting of discitis/OM, 4/2 down to 1500 but recovering from RSV with nl iron profile, low threshold to obtian ferriscan to objectively assess for iron overload.     3.  FEN/Renal: Creatinine 0.97, s/p nephrectormy, nephrology following     4.    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   4/2/2024 cGVHD NIG scoring eyes 3, oral 1, no other organ involvement clinically     #Skin GVHD- history of biopsy proven GVHD of the skin 8/30/2021; resolved.   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 WNL today   # Ocular GVHD: follows with ophthalmology.  Maxitrol to lids, continue refreshing drops QID. Score 3, but down to 2 times eye drops from >10/day, using scleral lenses  Followed closely by Dr. Juarez with significant improvement with scleral lenses and eyedrops  # Oral GVHD: dex s/s 2x.  Lichenoid changes have largely resolved with no open ulcers (December 2023 for flare of oral GVHD in the setting of oral herpes reactivation)    Previous therapies  Tacrolimus-previously decided to stay on same dose, no checks of levels if clinically stable, and no need switch to sirolimus. (keep tac low as gvhd is quiet and viremia). 5/10 level 45 with starting of COVID therapy and D-D intractions (Paxlovid for COVID19, which has a drug interaction with tacrolimus-Ritonavir increases serum levels of tacrolimus).   Tacrolimus level subtherapeutic, increase to 2.5 mg twice daily recently, 8/23/2022 instructed to change tacrolimus to 2 mg twice daily. 9/6 with mild NEGRA, hyperkalemia, hypomagnesemia, and reports some tremors and cramps, suspect tacrolimus to be high therefore empirically decreased to 1.5 twice daily, skip morning dose of tacrolimus and took 2 mg today after his afternoon labs. Decrease to 1mg BID on Saturday.  Sirolimus  9/21 despite fluids and a dose adjustment of tacrolimus patient seem to have persistence NORBERTO related NEGRA, therefore after discussion with the patient decision was made to transition to sirolimus that was started on 9/21, patient initially seem to tolerate this well with normalization of kidney function  10/11 presented with flares of ocular and oral GVHD, fluid retention and gain of 5-6 kg, lower extremity edema, abdominal distention, total protein to creatinine ratio elevated at 0.4, in addition to elevation in BUN and creatinine, HTN.  Picture most consistent with sirolimus related side effects.  Less likely that the patient will tolerate even a lower level of sirolimus.  Therefore the patient was instructed to stop sirolimus, last dose on  10/10. 10/14 level 3.5 appropriately trending down.     Corticosteroids  3/1-3/16: prednisone 100mg every day  3/17 75mg every day   3/31 75 alt with 50  4/6: 75 alt with 25mg every other day  4/12 pred tapered to 60 alternating with 25mg   4/15 In setting of viruses trying to reactivate,GVHD stable: Taper 60/15mg alternating.  4/20 decrease pred further to 50/10.    4/25 taper pred to 50/0 every other day as long as symptoms stable.   5/2 Pred decrease 40/0 alternating every other day.   5/13 decrease to 30/0  5/20 decrease to 20/0 then slowly by 5 mg weekly  6/7 decrease to 10/0  Went up to 15/0 with mild increased LFTs  8/23/2022 increased to 20/0, with persistence of oral ulcers and active ocular GVHD.  Discussed with the patient the importance of stopping chewing of nicotine gums for prolonged hours as that would not help with the healing of the oral ulcers.  I discussed with the patient alternatives of nicotine patches that he will reconsider and let me know.  9/6 decreased to 15 alternating with 0  9/13 decreased to 10 alternating with 0  9/21 discontinued tacrolimus given persistent NEGRA and single kidney and start the patient on sirolimus without loading dose.  We will get a list of possible side effects and adverse events from the Pharm.D. see sirolimus section above.  10/11 GVHD flare. Sirolimus stopped. Resume prednisone 40 mg daily as of 10/12, no change with mildly worsening eye pain 10/14  Reduce pred to 35mg 10/25 and 25mg 11/1 11/8 20 mg   11/22 15 mg  12/13/22 10 mg (plan to taper to 5mg then slow taper 1 mg per month given long pred history)  2/23/23 lower from 5 mg to 4 mg for the next month  3/28/2023 - 5/2/2023- 8/15/2023 continue on 10/4 given adrenal insufficieny with recent am cortisol check and clinical symptoms on taper to lower dose of 4 mg daily  -12/2023 had oral GVHD flare started on Prednisone 40 mg,  start taper to 40 and 30 mg alternating for 1 week then 30 mg daily for 1 week, then  30 and 20 alternating for 1 week, then 20 for 1 week till he sees me.  - 2/27/2024 decrease from 10/20mg prednisone to 10/15 then 10/10 -flared and now on 15mg with   - 4/2/2024 continue 15mg    Belumosudil  Patient intolerant/with disease flares on tacrolimus and sirolimus see above.  Discussed with the patient the different options for therapy of chronic GVHD that are FDA approved.  Given the side effect profiles after discussing in detail and given the least nephrotoxicity and expected response, taking into account other metabolic effect as well.  We will proceed with prior authorization with belumosudil.  Patient was given material to review for possible expected adverse events and side effects with both Belumosodil and ruxolitinib.   --- Started on 10/18/2022 - skin/liver/oral GVHD inactive, and occular symptoms controlled/symptomatic improvement.   --- 10/10/2023 will hold belumosodil given recurrent infections ans significant improvement in symptoms with no end organ damage after discussions with him and his wife. Will optimize topical treatment with scleral lenses, eyedrops ans dex s/sp int he interim to avoid flares. Will readdress need to restart systemic treatment pending infectious resolution and symptoms.      5.  ID:   # Recent history of Epidural abscess: Followed by Dr. Kodak BRADY, plan to be on IV ceftriaxone for at least 6 weeks course through 5/11/2023-to be determined on further follow-up.  See imaging with MRI follow-up as above.     #Recurrent discitis/OM still on treatment through October 2023, cx negative, managed with ID locally.  3/30/2023 blood culture with Staph epidermidis  3/31/2023 BONE, L5, FLUOROSCOPICALLY-GUIDED NEEDLE BIOPSY: Benign bone with trilineage hematopoiesis (normal) Negative for neoplasm Negative for acute osteomyelitis. DISC, L5-S1, ASPIRATION FOR CYTOLOGY: Acellular debris; no cellular material present for evaluation     #History of COVID x2 last 1/2023 and influenza     Treated with Paxlovid with persistent fatigue but no active respiratory symptoms  received his influenza annual vaccine as well as COVID boosters locally 11/16/2022     #History of pulmonary infiltrates:   4/20 CT chest with new RUL infiltrate. Highly immunocompromised. 4/22 Fungitell/asp GM were neg. BAL 4/29 NGTD, increased micafungin to 150mg IV daily-- f/u 6/1 Dr Garcia CT with mixed results, followed with Dr. Garcia August 2022 with resolution of pulmonary nodules and normalization of LFTs switched patient to posaconazole prophylactic dose and monitor LFTs and any infectious concerns closely. Monitor and low threshold check CT or add back if on higher dose steroids.     #History of CMV viremia:    - CMV viremia up to 1100. 4/15 started valcyte 900mg PO BID. 4/20 CMV <137, 5/10 ND, decreased to 450mg BID 4/30 - continue 4 weeks as long as CMV <137 till 5/28 + weekly CMV. Switched to acyclovir after completion of therapy 5/28/2022. recheck 3/1/23 ND    # History of Oral herpes  12/2023:HSV oral lesions; PCR positive. Stopped ACV last month and lesions appears soon thereafter. Empirically Rx Valtrex. mild lingering URI sx; RVP + rhino (12/21); supportive cares. (CCT 12/1/23 neg).started on pred 40mg/d.  Completed Valtrex course and put back on acyclovir restarted 1/20/2024    # History of PNA/bronchitis 2/9/2024 sp doxycyline    # History of RSV PNA 3/26/2024, s/p ribavirin    #Hypogammaglobulinemia with recurrent infections: will check prior auth for IVIG and confirm OK with nephrology       - PPx:   ---ACV  Patient developed oral herpes reactivation after stopping acyclovir therefore resumed on 1/9/2023  ---Posaconazole (previously on micafungin with LFts abl, hx of pulmonary nodules needign treatment dose). 11/14/2023 discontinue and check CT in 2 months given history completed December 2023 with no concerns for infections or nodular lesions. Monitor and low threshold check CT or add back if on higher dose  steroids.  ---Pentamidine, future 4/22/2024  ---On Penicillin 250 bid px     - EBV viremia: 4/20 CAP CT (w/ contrast): No adenopathy. S/p 4/22 and 5/4/22 rituximab 375mg/m2 5/10 ND x2, 3/13/2024     -  - vaccinations: Previously deferred annual vaccines in the setting of GVHD and immunosuppression therapy. 11/14/2023 we will start vaccination series, including Shingrix, COVID-vaccine. 1/9/2023 Shingrix vaccine.  Needs boosters on next follow up given today RSV recovery    6. Endo:   - Hx of graves disease; On synthroid follows with endocrine  - HLD: 11/2022 cholesterol 355, triglycerides 153, -- 11/8 started rosuvastatin 5 mg daily, 11/22 CPK within normal, 5/2/2023 repeat lipid profile cholesterol down to 202, triglycerides 191, LDL now normal at 95.  We will continue same dose of rosuvastatin and add fish oil 1g BID (on hold). Repeated August with PCP, who added back fish oil    7. CV:   - Hypertension history   - TTE most recently 10/2022, ECHO in January 2024 at [facility]     8. GI:   -Omeprazole for heartburn  -LFTs as above, now normal. Off ursodiol     9. Psych:   - Situational anxiety - lexapro 10mg daily.   - Insomnia: worse on steroids. Ativan, trazadone, melatonin     10. MSK:   -History of left food drop, PT. Occasional muscle cramps discussed optimizing vitamin D levels and considering vitamin D, some of the symptomatology of muscle cramps are likely related to chronic GVHD.  No muscle cramps reported today.  -4/2023 new tearing of the right acetabular labrum anterosuperiorly referred to to orthopedic surgery.       11. HCM/Age appropriate cancer screening:  -Discussed with the patient importance of age-appropriate cancer screening including colonoscopies, he is due for this.  -Discussed importance of at least once a year vitamin D level check, 8/18/2022 wnl  -Screening for secondary iron overload, see heme section above  -Yearly TSH 2022 wnl; yearly lipid panel - see GI section  above  -9/6/2022 DEXA scan Based on BMD diagnosis is consistent with normal bone density based on WHO criteria Ref. 1   -PFTs 9/20/22, see above. 9/21/2023 PFTs The FVC, FEV1, FEV1/FVC ratio and FLM15-93% are within normal limits. FVC 99% (108), FEV1 91%, DLCO uncorrected 91%.  Repeat PFTs in 4 to 6 months.  -Metabolic other, 8/2022 testosterone within normal  -11/22/2022 discussed with the patient the challenges and the stresses of chronic illness and healthcare fatigue.  Patient had previously been on Lexapro that we restarted confirmed with pharmacy that there are no drug drug interactions.  Additionally we will referred patient to PMNR to help with physical rehab and strength to help with fatigue.  Our social workers will also reach out to Nik and his wife for further resources including support groups for patients with chronic illness and fatigue post transplant.  -Referral to palliative care for symptom and pain management including insomnia     Plan:  - Continue prednisone 15mg would stay on this dose and monitor for flares  - IVIg prior auth sent, will confirm with nephrology ok to give  - Needs boosters on next follow up given today RSV recovery  - April pentamidine scheduled  - Continue dexamethasone 2 times a day and monitor oral symptoms  - follow with nephrology, endocrinology, PCP, follow-up locally with infectious disease, neurosurgery and pain management.PCP follow up for lipid profile, age appropriate cancer screening  - RTC with Dr Kera EKEN spent 45 minutes in the care of this patient today, which included time necessary for preparation for the visit, obtaining history, ordering medications/tests/procedures as medically indicated, review of pertinent medical literature, counseling of the patient, communication of recommendations to the care team, and documentation time.

## 2024-04-02 ENCOUNTER — APPOINTMENT (OUTPATIENT)
Dept: LAB | Facility: CLINIC | Age: 64
End: 2024-04-02
Attending: INTERNAL MEDICINE
Payer: MEDICARE

## 2024-04-02 ENCOUNTER — ONCOLOGY VISIT (OUTPATIENT)
Dept: TRANSPLANT | Facility: CLINIC | Age: 64
End: 2024-04-02
Attending: INTERNAL MEDICINE
Payer: MEDICARE

## 2024-04-02 VITALS
HEART RATE: 88 BPM | OXYGEN SATURATION: 95 % | SYSTOLIC BLOOD PRESSURE: 131 MMHG | RESPIRATION RATE: 18 BRPM | WEIGHT: 255.8 LBS | BODY MASS INDEX: 33.75 KG/M2 | DIASTOLIC BLOOD PRESSURE: 75 MMHG | TEMPERATURE: 97.6 F

## 2024-04-02 DIAGNOSIS — T86.09 GVHD AS COMPLICATION OF BONE MARROW TRANSPLANT (H): ICD-10-CM

## 2024-04-02 DIAGNOSIS — C91.01 ACUTE LYMPHOBLASTIC LEUKEMIA (ALL) IN REMISSION (H): ICD-10-CM

## 2024-04-02 DIAGNOSIS — D89.813 GVHD AS COMPLICATION OF BONE MARROW TRANSPLANT (H): ICD-10-CM

## 2024-04-02 LAB
ALBUMIN SERPL BCG-MCNC: 3.9 G/DL (ref 3.5–5.2)
ALP SERPL-CCNC: 94 U/L (ref 40–150)
ALT SERPL W P-5'-P-CCNC: 32 U/L (ref 0–70)
ANION GAP SERPL CALCULATED.3IONS-SCNC: 13 MMOL/L (ref 7–15)
AST SERPL W P-5'-P-CCNC: 37 U/L (ref 0–45)
BASOPHILS # BLD AUTO: 0 10E3/UL (ref 0–0.2)
BASOPHILS NFR BLD AUTO: 0 %
BILIRUB SERPL-MCNC: 0.8 MG/DL
BUN SERPL-MCNC: 34 MG/DL (ref 8–23)
CALCIUM SERPL-MCNC: 9.8 MG/DL (ref 8.8–10.2)
CHLORIDE SERPL-SCNC: 101 MMOL/L (ref 98–107)
CREAT SERPL-MCNC: 1.13 MG/DL (ref 0.67–1.17)
DEPRECATED HCO3 PLAS-SCNC: 23 MMOL/L (ref 22–29)
EGFRCR SERPLBLD CKD-EPI 2021: 72 ML/MIN/1.73M2
EOSINOPHIL # BLD AUTO: 0 10E3/UL (ref 0–0.7)
EOSINOPHIL NFR BLD AUTO: 0 %
ERYTHROCYTE [DISTWIDTH] IN BLOOD BY AUTOMATED COUNT: 15.6 % (ref 10–15)
FERRITIN SERPL-MCNC: 1597 NG/ML (ref 31–409)
GLUCOSE SERPL-MCNC: 126 MG/DL (ref 70–99)
HCT VFR BLD AUTO: 35.7 % (ref 40–53)
HGB BLD-MCNC: 12.1 G/DL (ref 13.3–17.7)
IMM GRANULOCYTES # BLD: 0.1 10E3/UL
IMM GRANULOCYTES NFR BLD: 1 %
IRON BINDING CAPACITY (ROCHE): 289 UG/DL (ref 240–430)
IRON SATN MFR SERPL: 44 % (ref 15–46)
IRON SERPL-MCNC: 128 UG/DL (ref 61–157)
LYMPHOCYTES # BLD AUTO: 2.7 10E3/UL (ref 0.8–5.3)
LYMPHOCYTES NFR BLD AUTO: 27 %
MCH RBC QN AUTO: 34.5 PG (ref 26.5–33)
MCHC RBC AUTO-ENTMCNC: 33.9 G/DL (ref 31.5–36.5)
MCV RBC AUTO: 102 FL (ref 78–100)
MONOCYTES # BLD AUTO: 0.5 10E3/UL (ref 0–1.3)
MONOCYTES NFR BLD AUTO: 5 %
NEUTROPHILS # BLD AUTO: 6.7 10E3/UL (ref 1.6–8.3)
NEUTROPHILS NFR BLD AUTO: 67 %
NRBC # BLD AUTO: 0.1 10E3/UL
NRBC BLD AUTO-RTO: 1 /100
PLATELET # BLD AUTO: 194 10E3/UL (ref 150–450)
POTASSIUM SERPL-SCNC: 4.5 MMOL/L (ref 3.4–5.3)
PROT SERPL-MCNC: 6.7 G/DL (ref 6.4–8.3)
RBC # BLD AUTO: 3.51 10E6/UL (ref 4.4–5.9)
SODIUM SERPL-SCNC: 137 MMOL/L (ref 135–145)
WBC # BLD AUTO: 10.1 10E3/UL (ref 4–11)

## 2024-04-02 PROCEDURE — 82728 ASSAY OF FERRITIN: CPT | Performed by: INTERNAL MEDICINE

## 2024-04-02 PROCEDURE — 99215 OFFICE O/P EST HI 40 MIN: CPT | Mod: 24 | Performed by: INTERNAL MEDICINE

## 2024-04-02 PROCEDURE — 80053 COMPREHEN METABOLIC PANEL: CPT | Performed by: INTERNAL MEDICINE

## 2024-04-02 PROCEDURE — 83550 IRON BINDING TEST: CPT | Performed by: INTERNAL MEDICINE

## 2024-04-02 PROCEDURE — 36591 DRAW BLOOD OFF VENOUS DEVICE: CPT | Performed by: INTERNAL MEDICINE

## 2024-04-02 PROCEDURE — G0463 HOSPITAL OUTPT CLINIC VISIT: HCPCS | Performed by: INTERNAL MEDICINE

## 2024-04-02 PROCEDURE — 85004 AUTOMATED DIFF WBC COUNT: CPT | Performed by: INTERNAL MEDICINE

## 2024-04-02 PROCEDURE — 250N000011 HC RX IP 250 OP 636: Performed by: INTERNAL MEDICINE

## 2024-04-02 RX ORDER — GUAIFENESIN 200 MG/10ML
200 LIQUID ORAL EVERY 4 HOURS PRN
COMMUNITY
Start: 2024-03-29 | End: 2024-04-05

## 2024-04-02 RX ORDER — PREDNISONE 10 MG/1
10 TABLET ORAL DAILY
Qty: 30 TABLET | Refills: 3 | Status: SHIPPED | OUTPATIENT
Start: 2024-04-02 | End: 2024-05-09

## 2024-04-02 RX ORDER — HEPARIN SODIUM (PORCINE) LOCK FLUSH IV SOLN 100 UNIT/ML 100 UNIT/ML
5 SOLUTION INTRAVENOUS ONCE
Status: COMPLETED | OUTPATIENT
Start: 2024-04-02 | End: 2024-04-02

## 2024-04-02 RX ORDER — DEXAMETHASONE 0.5 MG/5ML
1 SOLUTION ORAL 4 TIMES DAILY
Qty: 500 ML | Refills: 3 | Status: SHIPPED | OUTPATIENT
Start: 2024-04-02 | End: 2024-07-09

## 2024-04-02 RX ADMIN — Medication 5 ML: at 13:39

## 2024-04-02 ASSESSMENT — PAIN SCALES - GENERAL: PAINLEVEL: NO PAIN (0)

## 2024-04-02 NOTE — LETTER
4/2/2024         RE: Nik Nava  [address] [facility] [id number]        Dear Colleague,    Thank you for referring your patient, Nik Nava, to the [facility] [place]. Please see a copy of my visit note below.      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 Recent oral HSV and oral cGVHD flare treated with prednisone.  Feb 2024 Bronchitis.  Discharged last week from local admission for RSV PNA.       HPI:    Last week admitted [id number] for RSV PNA started on ribavirin to be completed today, stil with SOB with minimal exertion, mild rhinorrhea, cough persistent occasionally productive. Overall feel mentally and physically down still recovering from this. But Mouth overall good, no ulcers, eating well, no oral GVHD flares with current illness, doing dexamethasone swish and spit 2 times a day. Vision better. Using eyedrops 2 times a day only down from more than 10 times per day earlier in the summer, using scleral lenses- made a big difference for him. No chest pain.  No nausea vomiting or diarrhea.  No bleeding and no headaches.    Review of Systems                                                                                                                                         10 point Review of systems was negative except as detailed above     PHYSICAL EXAM                                                                                                                                                   KPS: 70    Wt Readings from Last 4 Encounters:   03/13/24 115 kg (253 lb 8 oz)   02/27/24 113.4 kg (249 lb 14.4 oz)   02/20/24 111.6 kg (246 lb)   02/05/24 113.4 kg (250 lb)      General: NAD.  HEENT: sclera anicteric, injected conjunctivae.    11/14/2023 No noted lichenoid changes in the oral mucosa previous prominent area of healed  ulcers in the right/left mid buccal mucosa are less prominent with overall improved appearence and more normal appearing mucosal tissue, no ulcers seen.    1/9/2024 mild lichenoid changes and healing ulcers on both buccal mucosa no open discolored lesions noted  2/27/2024  no lichenoid changes, healing ulcers/minimal scaring on both buccal mucosa  4/2/2024  no lichenoid changes, healing ulcers/minimal scaring on both buccal mucosa  Lungs:  CTA b/l  no wheezes  CV: RRR  no gallop  Abd; soft, NABS, protuberant  Ext/ Skin: Skin bruising on bilateral forearms,  fainting of previous hyperpigmented areas of previously known cGVHD  LE trace bilateral edema in lower extremities- wearing compression socks    cGVHD therapy started on February 24 2022  - Baseline NIH scoring 2/24/2022 : skin 2 maculopapular rash/erythema with no sclerotic features, mouth score 1 mild symptoms with lichenoid changes less than 25%, eyes score 2 moderate symptoms without new visual impairments due to KCS requiring lubricant eyedrops more than 3 times a day, overall mild scale for her provider  - 3/25/2022 1 month NIH treatment assessment on PQRST: skin 2, mouth score 1 mild symptoms with NO lichenoid changes, eyes score 2 moderate symptoms w requiring lubricant eyedrops more than 3 times a day, liver score 1 ALT 3.7x ULN and nl bilirubin   - 3/31  Mouth clear; eyes minimal LFT still abn; no joint or new GI sx  - 4/28 Mouth clear, Left>R eye is mild sclera erythema, lids are pink and irritated appearing, LFT's slow improvement, no new joint, skin, or GI symptoms.   -5/11 mouth with mild erythema but no lichenoid changes, eyes using eyedrops 4-6 times a day score of 2, LFTs significantly improved from baseline slight elevation in alk phos and AST with normal bilirubin, skin with hyperpigmentation from old acute GVHD no active skin rashes, no GI symptoms no musculoskeletal complaints.  -5/26 Mouth with very slight lichenoid changes, erythema.  Eyes  still using eyedrops 4-6x per day.  LFTs essentially normal with slight elevation in alk phos.  Skin with hyperpigmentation from old acute GVHD, no active rashes, no GI or MSK concerns.  Skin 0, mouth 0 but with lichenoid changes, eyes 2  -6/7/22 Mouth with no lichenoid changes, erythema.  Eyes still using eyedrops 4-6x per day.  LFTs essentially normal with slight elevation in alk phos.  Skin with hyperpigmentation from old acute GVHD, no active rashes, no GI or MSK concerns.  Skin 0, mouth 0, eyes 2     -6 month PQRST assessment 9/6/2022: eyes 3, mouth 0 for symptoms, 25% lichenoid changes (1), liver/GI/skin 0    11/8/2022, 11/22/2022, 12/13/22 cGVHD NIG scoring eyes 3, no other organ involvement clinically  3/28/23 cGVHD NIG scoring eyes 3, no other organ involvement clinically  5/2/23 cGVHD NIG scoring eyes 3, oral 1, no other organ involvement clinically  6/13/23 cGVHD NIG scoring eyes 3, oral 1, no other organ involvement clinically  8/15/23 cGVHD NIG scoring eyes 3 (down to 3-4 times eye drop from >10 but still using scleral lenses), oral 1, no other organ involvement clinically  10/10/2023 cGVHD NIG scoring eyes 3 (down to 3-4 times eye drop from >10 but still using scleral lenses), oral 1, no other organ involvement clinically  11/14/2023 cGVHD NIG scoring eyes 3 (down to 3-4 times eye drop from >10 but still using scleral lenses), oral 1, no other organ involvement clinically  1/9/2024 cGVHD NIG scoring eyes 3 (down to 3-4 times eye drop from >10 but still using scleral lenses), oral 2, no other organ involvement clinically  2/27/2024 cGVHD NIG scoring eyes 3 (down to 3-4 times eye drop from >10 but still using scleral lenses), oral 1, no other organ involvement clinically  4/22024 cGVHD NIG scoring eyes 3 (down to 2 times eye drop from >10 but still using scleral lenses), oral 1, no other organ involvement clinically    Lab:    Lab Results   Component Value Date    WBC 10.5 03/13/2024    ANEU 3.5  10/26/2021    HGB 12.1 (L) 03/13/2024    HCT 35.7 (L) 03/13/2024     03/13/2024     03/13/2024    POTASSIUM 4.4 03/13/2024    CHLORIDE 102 03/13/2024    CO2 26 03/13/2024     (H) 03/13/2024    BUN 33.2 (H) 03/13/2024    CR 1.29 (H) 03/13/2024    MAG 2.0 11/14/2023    INR 0.90 12/01/2022    BILITOTAL 0.7 03/13/2024    AST 34 03/13/2024    ALT 35 03/13/2024    ALKPHOS 88 03/13/2024    PROTTOTAL 6.8 03/13/2024    ALBUMIN 4.4 03/13/2024 4/28/2023    MRI Lumbar spine  1.  The previously seen ventral epidural abscess has resolved with small amount of residual ventral epidural phlegmon.   2.  Marrow edema about the L5-S1 endplates has mildly worsened within new rim-enhancing subchondral lesion in the left S1 superior endplate. This could reflect progression of osteomyelitis.      MRI hip right  1.  No evidence of septic arthritis of the right hip joint.   2.  There is degeneration and likely tearing of the right acetabular labrum anterosuperiorly, and probably low-grade chondromalacia of the right hip joint.   3.  Abnormality at L5-S1 compatible with known discitis-osteomyelitis.    ASSESSMENT AND PLAN   Nik Nava is a 63 year old male with Ph Pos ALL, day 965 s/p sib allo stem cell transplant    Day -6 (8/4): flu/cy  Day -5 through day -2 (8/5-8/8): flu  Day -1 (8/9): TBI  Day 0 (8/10): transplant     1.  Acute lymphoblastic leukemia, [place] chromosome positive in CR, MRD neg. S/p allo sib PBSCT. (ABO matched)  HCT-CI score: 3 (prior solid tumor)  Day +100 bone marrow biopsy is 100% donor with no morphologic or flow cytometric evidence of leukemia BCR abl is pending.  - Day +180 restaging BM bx- still in CR  100% donor;  2/16/22 BCR ABL major breakpoint undetectable.   - 1 year restaging 8/18/2022: Markedly hypocellular bone marrow [less than 10% cellular] with maturing trilineage hematopoiesis and no definite morphologic or immunophenotypic evidence for involvement by B lymphoblastic  leukemia. BCRABL1 PCR not detected, bone marrow % donor. FISH NORMAL No evidence of BCR-ABL1 fusion  - Maintenance with TKI posttransplantation given his Ph positive ALL that have not been started previously presumed secondary to multiple active issues specifically infections and COVID.  Per Avila Tobar: will reconsider this patient will think about whether this is something he would like to consider he was previously on Dasatinib, we would start on a low dose and increase as tolerated.  This is on hold now pending active GVHD therapy, we discussed on this visit 10/18 in agreement with holding off.  - 2 year restaging 8/7/2023 BMB: - No morphologic or immunophenotypic evidence of recurrent/persistent B-lymphoblastic leukemia. Slightly hypocellular marrow (10-20% estimated cellularity, patchy and variable), with trilineage hematopoetiic maturation and less than 2% blasts. Peripheral blood showing slight normocytic normochromic anemia and slight thrombocytopenia. BM % donor. FISH No evidence of BCR::ABL1 fusion /CG No recipient (male) cells  or cells with a t(9;22) or other clonal chromosomal abnormality were  detected among the 20 metaphases analyzed.      2.  HEME: CBC stable.   3/2023 iron low 28, iron saturation index 10%, transferrin 225, ferritin 1381 down trending (in retrospect that was the time of epidural abscess as well).  B12, folate normal.  High copper and zinc borderline low, 5/2023 started zinc.  Started iron replacement in 4/2023, recheck iron profile on follow up more consistent with AOCD, Iron 11/14/23 WNL. Note ferritin elevation is in the setting of discitis/OM, 4/2 down to 1500 but recovering from RSV with nl iron profile, low threshold to obtian ferriscan to objectively assess for iron overload.     3.  FEN/Renal: Creatinine 0.97, s/p nephrectormy, nephrology following     4.    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   4/2/2024 cGVHD NIG scoring eyes 3,  oral 1, no other organ involvement clinically     #Skin GVHD- history of biopsy proven GVHD of the skin 8/30/2021; resolved.   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 WNL today   # Ocular GVHD: follows with ophthalmology. Maxitrol to lids, continue refreshing drops QID. Score 3, but down to 2 times eye drops from >10/day, using scleral lenses  Followed closely by Dr. Juarez with significant improvement with scleral lenses and eyedrops  # Oral GVHD: dex s/s 2x.  Lichenoid changes have largely resolved with no open ulcers (December 2023 for flare of oral GVHD in the setting of oral herpes reactivation)    Previous therapies  Tacrolimus-previously decided to stay on same dose, no checks of levels if clinically stable, and no need switch to sirolimus. (keep tac low as gvhd is quiet and viremia). 5/10 level 45 with starting of COVID therapy and D-D intractions (Paxlovid for COVID19, which has a drug interaction with tacrolimus-Ritonavir increases serum levels of tacrolimus).   Tacrolimus level subtherapeutic, increase to 2.5 mg twice daily recently, 8/23/2022 instructed to change tacrolimus to 2 mg twice daily. 9/6 with mild NEGRA, hyperkalemia, hypomagnesemia, and reports some tremors and cramps, suspect tacrolimus to be high therefore empirically decreased to 1.5 twice daily, skip morning dose of tacrolimus and took 2 mg today after his afternoon labs. Decrease to 1mg BID on Saturday.  Sirolimus  9/21 despite fluids and a dose adjustment of tacrolimus patient seem to have persistence NORBERTO related NEGRA, therefore after discussion with the patient decision was made to transition to sirolimus that was started on 9/21, patient initially seem to tolerate this well with normalization of kidney function  10/11 presented with flares of ocular and oral GVHD, fluid retention and gain of 5-6 kg, lower extremity edema, abdominal distention, total protein to creatinine ratio elevated at  0.4, in addition to elevation in BUN and creatinine, HTN.  Picture most consistent with sirolimus related side effects.  Less likely that the patient will tolerate even a lower level of sirolimus.  Therefore the patient was instructed to stop sirolimus, last dose on 10/10. 10/14 level 3.5 appropriately trending down.     Corticosteroids  3/1-3/16: prednisone 100mg every day  3/17 75mg every day   3/31 75 alt with 50  4/6: 75 alt with 25mg every other day  4/12 pred tapered to 60 alternating with 25mg   4/15 In setting of viruses trying to reactivate,GVHD stable: Taper 60/15mg alternating.  4/20 decrease pred further to 50/10.    4/25 taper pred to 50/0 every other day as long as symptoms stable.   5/2 Pred decrease 40/0 alternating every other day.   5/13 decrease to 30/0  5/20 decrease to 20/0 then slowly by 5 mg weekly  6/7 decrease to 10/0  Went up to 15/0 with mild increased LFTs  8/23/2022 increased to 20/0, with persistence of oral ulcers and active ocular GVHD.  Discussed with the patient the importance of stopping chewing of nicotine gums for prolonged hours as that would not help with the healing of the oral ulcers.  I discussed with the patient alternatives of nicotine patches that he will reconsider and let me know.  9/6 decreased to 15 alternating with 0  9/13 decreased to 10 alternating with 0  9/21 discontinued tacrolimus given persistent NEGRA and single kidney and start the patient on sirolimus without loading dose.  We will get a list of possible side effects and adverse events from the Pharm.D. see sirolimus section above.  10/11 GVHD flare. Sirolimus stopped. Resume prednisone 40 mg daily as of 10/12, no change with mildly worsening eye pain 10/14  Reduce pred to 35mg 10/25 and 25mg 11/1 11/8 20 mg   11/22 15 mg  12/13/22 10 mg (plan to taper to 5mg then slow taper 1 mg per month given long pred history)  2/23/23 lower from 5 mg to 4 mg for the next month  3/28/2023 - 5/2/2023- 8/15/2023 continue  on 10/4 given adrenal insufficieny with recent am cortisol check and clinical symptoms on taper to lower dose of 4 mg daily  -12/2023 had oral GVHD flare started on Prednisone 40 mg,  start taper to 40 and 30 mg alternating for 1 week then 30 mg daily for 1 week, then 30 and 20 alternating for 1 week, then 20 for 1 week till he sees me.  - 2/27/2024 decrease from 10/20mg prednisone to 10/15 then 10/10 -flared and now on 15mg with   - 4/2/2024 continue 15mg    Belumosudil  Patient intolerant/with disease flares on tacrolimus and sirolimus see above.  Discussed with the patient the different options for therapy of chronic GVHD that are FDA approved.  Given the side effect profiles after discussing in detail and given the least nephrotoxicity and expected response, taking into account other metabolic effect as well.  We will proceed with prior authorization with belumosudil.  Patient was given material to review for possible expected adverse events and side effects with both Belumosodil and ruxolitinib.   --- Started on 10/18/2022 - skin/liver/oral GVHD inactive, and occular symptoms controlled/symptomatic improvement.   --- 10/10/2023 will hold belumosodil given recurrent infections ans significant improvement in symptoms with no end organ damage after discussions with him and his wife. Will optimize topical treatment with scleral lenses, eyedrops ans dex s/sp int he interim to avoid flares. Will readdress need to restart systemic treatment pending infectious resolution and symptoms.      5.  ID:   # Recent history of Epidural abscess: Followed by Dr. Candelario ID, plan to be on IV ceftriaxone for at least 6 weeks course through 5/11/2023-to be determined on further follow-up.  See imaging with MRI follow-up as above.     #Recurrent discitis/OM still on treatment through October 2023, cx negative, managed with ID locally.  3/30/2023 blood culture with Staph epidermidis  3/31/2023 BONE, L5, FLUOROSCOPICALLY-GUIDED NEEDLE  BIOPSY: Benign bone with trilineage hematopoiesis (normal) Negative for neoplasm Negative for acute osteomyelitis. DISC, L5-S1, ASPIRATION FOR CYTOLOGY: Acellular debris; no cellular material present for evaluation     #History of COVID x2 last 1/2023 and influenza    Treated with Paxlovid with persistent fatigue but no active respiratory symptoms  received his influenza annual vaccine as well as COVID boosters locally 11/16/2022     #History of pulmonary infiltrates:   4/20 CT chest with new RUL infiltrate. Highly immunocompromised. 4/22 Fungitell/asp GM were neg. BAL 4/29 NGTD, increased micafungin to 150mg IV daily-- f/u 6/1 Dr Garcia CT with mixed results, followed with Dr. Garcia August 2022 with resolution of pulmonary nodules and normalization of LFTs switched patient to posaconazole prophylactic dose and monitor LFTs and any infectious concerns closely. Monitor and low threshold check CT or add back if on higher dose steroids.     #History of CMV viremia:    - CMV viremia up to 1100. 4/15 started valcyte 900mg PO BID. 4/20 CMV <137, 5/10 ND, decreased to 450mg BID 4/30 - continue 4 weeks as long as CMV <137 till 5/28 + weekly CMV. Switched to acyclovir after completion of therapy 5/28/2022. recheck 3/1/23 ND    # History of Oral herpes  12/2023:HSV oral lesions; PCR positive. Stopped ACV last month and lesions appears soon thereafter. Empirically Rx Valtrex. mild lingering URI sx; RVP + rhino (12/21); supportive cares. (CCT 12/1/23 neg).started on pred 40mg/d.  Completed Valtrex course and put back on acyclovir restarted 1/20/2024    # History of PNA/bronchitis 2/9/2024 sp doxycyline    # History of RSV PNA 3/26/2024, s/p ribavirin    #Hypogammaglobulinemia with recurrent infections: will check prior auth for IVIG and confirm OK with nephrology       - PPx:   ---ACV  Patient developed oral herpes reactivation after stopping acyclovir therefore resumed on 1/9/2023  ---Posaconazole (previously on micafungin with  LFts abl, hx of pulmonary nodules needign treatment dose). 11/14/2023 discontinue and check CT in 2 months given history completed December 2023 with no concerns for infections or nodular lesions. Monitor and low threshold check CT or add back if on higher dose steroids.  ---Pentamidine, future 4/22/2024  ---On Penicillin 250 bid px     - EBV viremia: 4/20 CAP CT (w/ contrast): No adenopathy. S/p 4/22 and 5/4/22 rituximab 375mg/m2 5/10 ND x2, 3/13/2024     -  - vaccinations: Previously deferred annual vaccines in the setting of GVHD and immunosuppression therapy. 11/14/2023 we will start vaccination series, including Shingrix, COVID-vaccine. 1/9/2023 Shingrix vaccine.  Needs boosters on next follow up given today RSV recovery    6. Endo:   - Hx of graves disease; On synthroid follows with endocrine  - HLD: 11/2022 cholesterol 355, triglycerides 153, -- 11/8 started rosuvastatin 5 mg daily, 11/22 CPK within normal, 5/2/2023 repeat lipid profile cholesterol down to 202, triglycerides 191, LDL now normal at 95.  We will continue same dose of rosuvastatin and add fish oil 1g BID (on hold). Repeated August with PCP, who added back fish oil    7. CV:   - Hypertension history   - TTE most recently 10/2022, ECHO in January 2024 at [facility]     8. GI:   -Omeprazole for heartburn  -LFTs as above, now normal. Off ursodiol     9. Psych:   - Situational anxiety - lexapro 10mg daily.   - Insomnia: worse on steroids. Ativan, trazadone, melatonin     10. MSK:   -History of left food drop, PT. Occasional muscle cramps discussed optimizing vitamin D levels and considering vitamin D, some of the symptomatology of muscle cramps are likely related to chronic GVHD.  No muscle cramps reported today.  -4/2023 new tearing of the right acetabular labrum anterosuperiorly referred to to orthopedic surgery.       11. HCM/Age appropriate cancer screening:  -Discussed with the patient importance of age-appropriate cancer screening  including colonoscopies, he is due for this.  -Discussed importance of at least once a year vitamin D level check, 8/18/2022 wnl  -Screening for secondary iron overload, see heme section above  -Yearly TSH 2022 wnl; yearly lipid panel - see GI section above  -9/6/2022 DEXA scan Based on BMD diagnosis is consistent with normal bone density based on WHO criteria Ref. 1   -PFTs 9/20/22, see above. 9/21/2023 PFTs The FVC, FEV1, FEV1/FVC ratio and BIS28-72% are within normal limits. FVC 99% (108), FEV1 91%, DLCO uncorrected 91%.  Repeat PFTs in 4 to 6 months.  -Metabolic other, 8/2022 testosterone within normal  -11/22/2022 discussed with the patient the challenges and the stresses of chronic illness and healthcare fatigue.  Patient had previously been on Lexapro that we restarted confirmed with pharmacy that there are no drug drug interactions.  Additionally we will referred patient to PMNR to help with physical rehab and strength to help with fatigue.  Our social workers will also reach out to Nik and his wife for further resources including support groups for patients with chronic illness and fatigue post transplant.  -Referral to palliative care for symptom and pain management including insomnia     Plan:  - Continue prednisone 15mg would stay on this dose and monitor for flares  - IVIg prior auth sent, will confirm with nephrology ok to give  - Needs boosters on next follow up given today RSV recovery  - April pentamidine scheduled  - Continue dexamethasone 2 times a day and monitor oral symptoms  - follow with nephrology, endocrinology, PCP, follow-up locally with infectious disease, neurosurgery and pain management.PCP follow up for lipid profile, age appropriate cancer screening  - RTC with Dr Cote       I spent 45 minutes in the care of this patient today, which included time necessary for preparation for the visit, obtaining history, ordering medications/tests/procedures as medically indicated, review of  pertinent medical literature, counseling of the patient, communication of recommendations to the care team, and documentation time.        Akshat Tobar MD

## 2024-04-02 NOTE — NURSING NOTE
"Oncology Rooming Note    April 2, 2024 2:10 PM   Nik Nava is a 63 year old male who presents for:    Chief Complaint   Patient presents with    Port Draw     Labs drawn via port by RN in lab. VS taken.     Oncology Clinic Visit     Acute Lymphoblastic Leukemia     Initial Vitals: /75 (BP Location: Right arm, Patient Position: Sitting, Cuff Size: Adult Large)   Pulse 88   Temp 97.6  F (36.4  C) (Oral)   Resp 18   Wt 116 kg (255 lb 12.8 oz)   SpO2 95%   BMI 33.75 kg/m   Estimated body mass index is 33.75 kg/m  as calculated from the following:    Height as of 2/5/24: 1.854 m (6' 1\").    Weight as of this encounter: 116 kg (255 lb 12.8 oz). Body surface area is 2.44 meters squared.  No Pain (0) Comment: Data Unavailable   No LMP for male patient.  Allergies reviewed: Yes  Medications reviewed: Yes    Medications: Medication refills not needed today.  Pharmacy name entered into [employer]:    [facility] - [place], [place] - [address] [facility] [place], [place] - [address] [facility] [phone]  [facility]  [facility] - [place], [place] - [address] [place] [address]    Frailty Screening:   Is the patient here for a new oncology consult visit in cancer care? 2. No      Clinical concerns: Patient feels fatigued since hospitalization; c/o coughing primarily when he lies down.       Deborah Michaels LPN  4/2/2024              "

## 2024-04-02 NOTE — NURSING NOTE
Chief Complaint   Patient presents with    Port Draw     Labs drawn via port by RN in lab. VS taken.      Labs drawn via port by RN. Port accessed with 20g flat needle. Flushed with saline and heparin. De-accessed after labs drawn. Pt tolerated well. Vitals taken. Pt checked into next appt.     Bernadette Smith RN

## 2024-04-04 DIAGNOSIS — D80.1 HYPOGAMMAGLOBULINEMIA (H): Primary | ICD-10-CM

## 2024-04-05 PROBLEM — D80.1 HYPOGAMMAGLOBULINEMIA (H): Status: ACTIVE | Noted: 2024-04-05

## 2024-04-08 ENCOUNTER — TELEPHONE (OUTPATIENT)
Dept: TRANSPLANT | Facility: CLINIC | Age: 64
End: 2024-04-08
Payer: MEDICARE

## 2024-04-08 DIAGNOSIS — Z94.81 STATUS POST BONE MARROW TRANSPLANT (H): Primary | ICD-10-CM

## 2024-04-08 DIAGNOSIS — D80.1 HYPOGAMMAGLOBULINEMIA (H): ICD-10-CM

## 2024-04-08 RX ORDER — ALBUTEROL SULFATE 90 UG/1
1-2 AEROSOL, METERED RESPIRATORY (INHALATION)
Status: CANCELLED
Start: 2024-04-08

## 2024-04-08 RX ORDER — METHYLPREDNISOLONE SODIUM SUCCINATE 125 MG/2ML
125 INJECTION, POWDER, LYOPHILIZED, FOR SOLUTION INTRAMUSCULAR; INTRAVENOUS
Status: CANCELLED
Start: 2024-04-08

## 2024-04-08 RX ORDER — MEPERIDINE HYDROCHLORIDE 25 MG/ML
25 INJECTION INTRAMUSCULAR; INTRAVENOUS; SUBCUTANEOUS EVERY 30 MIN PRN
Status: CANCELLED | OUTPATIENT
Start: 2024-04-08

## 2024-04-08 RX ORDER — EPINEPHRINE 1 MG/ML
0.3 INJECTION, SOLUTION INTRAMUSCULAR; SUBCUTANEOUS EVERY 5 MIN PRN
Status: CANCELLED | OUTPATIENT
Start: 2024-04-08

## 2024-04-08 RX ORDER — ALBUTEROL SULFATE 0.83 MG/ML
2.5 SOLUTION RESPIRATORY (INHALATION)
Status: CANCELLED | OUTPATIENT
Start: 2024-04-08

## 2024-04-08 RX ORDER — HEPARIN SODIUM (PORCINE) LOCK FLUSH IV SOLN 100 UNIT/ML 100 UNIT/ML
5 SOLUTION INTRAVENOUS
Status: CANCELLED | OUTPATIENT
Start: 2024-04-08

## 2024-04-08 RX ORDER — DIPHENHYDRAMINE HYDROCHLORIDE 50 MG/ML
50 INJECTION INTRAMUSCULAR; INTRAVENOUS
Status: CANCELLED
Start: 2024-04-08

## 2024-04-08 RX ORDER — ACETAMINOPHEN 325 MG/1
650 TABLET ORAL ONCE
Status: CANCELLED
Start: 2024-04-08

## 2024-04-08 RX ORDER — HEPARIN SODIUM,PORCINE 10 UNIT/ML
5-20 VIAL (ML) INTRAVENOUS DAILY PRN
Status: CANCELLED | OUTPATIENT
Start: 2024-04-08

## 2024-04-08 RX ORDER — DIPHENHYDRAMINE HCL 25 MG
50 CAPSULE ORAL ONCE
Status: CANCELLED
Start: 2024-04-08

## 2024-04-11 RX ORDER — HEPARIN SODIUM,PORCINE 10 UNIT/ML
5-20 VIAL (ML) INTRAVENOUS DAILY PRN
OUTPATIENT
Start: 2024-07-04

## 2024-04-11 RX ORDER — METHYLPREDNISOLONE SODIUM SUCCINATE 125 MG/2ML
125 INJECTION, POWDER, LYOPHILIZED, FOR SOLUTION INTRAMUSCULAR; INTRAVENOUS
Start: 2024-07-04

## 2024-04-11 RX ORDER — ALBUTEROL SULFATE 0.83 MG/ML
2.5 SOLUTION RESPIRATORY (INHALATION)
OUTPATIENT
Start: 2024-07-04

## 2024-04-11 RX ORDER — MEPERIDINE HYDROCHLORIDE 25 MG/ML
25 INJECTION INTRAMUSCULAR; INTRAVENOUS; SUBCUTANEOUS EVERY 30 MIN PRN
OUTPATIENT
Start: 2024-07-04

## 2024-04-11 RX ORDER — HEPARIN SODIUM (PORCINE) LOCK FLUSH IV SOLN 100 UNIT/ML 100 UNIT/ML
5 SOLUTION INTRAVENOUS
OUTPATIENT
Start: 2024-07-04

## 2024-04-11 RX ORDER — ALBUTEROL SULFATE 90 UG/1
1-2 AEROSOL, METERED RESPIRATORY (INHALATION)
Start: 2024-07-04

## 2024-04-11 RX ORDER — DIPHENHYDRAMINE HCL 25 MG
50 CAPSULE ORAL ONCE
Start: 2024-07-04

## 2024-04-11 RX ORDER — EPINEPHRINE 1 MG/ML
0.3 INJECTION, SOLUTION INTRAMUSCULAR; SUBCUTANEOUS EVERY 5 MIN PRN
OUTPATIENT
Start: 2024-07-04

## 2024-04-11 RX ORDER — ACETAMINOPHEN 325 MG/1
650 TABLET ORAL ONCE
Start: 2024-07-04

## 2024-04-11 RX ORDER — DIPHENHYDRAMINE HYDROCHLORIDE 50 MG/ML
50 INJECTION INTRAMUSCULAR; INTRAVENOUS
Start: 2024-07-04

## 2024-04-12 ENCOUNTER — OFFICE VISIT (OUTPATIENT)
Dept: OPHTHALMOLOGY | Facility: CLINIC | Age: 64
End: 2024-04-12
Payer: MEDICARE

## 2024-04-12 ENCOUNTER — INFUSION THERAPY VISIT (OUTPATIENT)
Dept: TRANSPLANT | Facility: CLINIC | Age: 64
End: 2024-04-12
Attending: INTERNAL MEDICINE
Payer: MEDICARE

## 2024-04-12 VITALS
DIASTOLIC BLOOD PRESSURE: 83 MMHG | TEMPERATURE: 97.9 F | HEART RATE: 78 BPM | OXYGEN SATURATION: 99 % | SYSTOLIC BLOOD PRESSURE: 127 MMHG | RESPIRATION RATE: 16 BRPM

## 2024-04-12 DIAGNOSIS — Z96.1 PSEUDOPHAKIA, BOTH EYES: ICD-10-CM

## 2024-04-12 DIAGNOSIS — Z94.81 STATUS POST BONE MARROW TRANSPLANT (H): Primary | ICD-10-CM

## 2024-04-12 DIAGNOSIS — T86.09 GVHD AS COMPLICATION OF BONE MARROW TRANSPLANT (H): ICD-10-CM

## 2024-04-12 DIAGNOSIS — D89.813 GVHD AS COMPLICATION OF BONE MARROW TRANSPLANT (H): ICD-10-CM

## 2024-04-12 DIAGNOSIS — D80.1 HYPOGAMMAGLOBULINEMIA (H): ICD-10-CM

## 2024-04-12 DIAGNOSIS — H04.129 DRY EYE: Primary | ICD-10-CM

## 2024-04-12 PROCEDURE — 250N000011 HC RX IP 250 OP 636: Mod: JZ | Performed by: INTERNAL MEDICINE

## 2024-04-12 PROCEDURE — 96366 THER/PROPH/DIAG IV INF ADDON: CPT

## 2024-04-12 PROCEDURE — 96365 THER/PROPH/DIAG IV INF INIT: CPT

## 2024-04-12 PROCEDURE — 250N000013 HC RX MED GY IP 250 OP 250 PS 637: Performed by: INTERNAL MEDICINE

## 2024-04-12 PROCEDURE — 36591 DRAW BLOOD OFF VENOUS DEVICE: CPT | Performed by: INTERNAL MEDICINE

## 2024-04-12 PROCEDURE — 99213 OFFICE O/P EST LOW 20 MIN: CPT | Mod: 24 | Performed by: OPTOMETRIST

## 2024-04-12 PROCEDURE — 82784 ASSAY IGA/IGD/IGG/IGM EACH: CPT | Performed by: INTERNAL MEDICINE

## 2024-04-12 PROCEDURE — 96375 TX/PRO/DX INJ NEW DRUG ADDON: CPT

## 2024-04-12 RX ORDER — ACETAMINOPHEN 325 MG/1
650 TABLET ORAL ONCE
Status: COMPLETED | OUTPATIENT
Start: 2024-04-12 | End: 2024-04-12

## 2024-04-12 RX ORDER — DIPHENHYDRAMINE HCL 25 MG
50 CAPSULE ORAL ONCE
Status: COMPLETED | OUTPATIENT
Start: 2024-04-12 | End: 2024-04-12

## 2024-04-12 RX ADMIN — HUMAN IMMUNOGLOBULIN G 40 G: 40 LIQUID INTRAVENOUS at 11:59

## 2024-04-12 RX ADMIN — DIPHENHYDRAMINE HYDROCHLORIDE 50 MG: 25 CAPSULE ORAL at 11:23

## 2024-04-12 RX ADMIN — ACETAMINOPHEN 650 MG: 325 TABLET ORAL at 11:23

## 2024-04-12 RX ADMIN — HYDROCORTISONE SODIUM SUCCINATE 100 MG: 100 INJECTION, POWDER, FOR SOLUTION INTRAMUSCULAR; INTRAVENOUS at 11:23

## 2024-04-12 ASSESSMENT — SLIT LAMP EXAM - LIDS
COMMENTS: 1+ BLEPH, THICKENED MARGINS, 1-2+ MGD
COMMENTS: 1+ BLEPH, THICKENED MARGINS, 1-2+ MGD

## 2024-04-12 ASSESSMENT — CONF VISUAL FIELD
OD_SUPERIOR_TEMPORAL_RESTRICTION: 0
OS_SUPERIOR_TEMPORAL_RESTRICTION: 0
OD_INFERIOR_TEMPORAL_RESTRICTION: 0
OD_SUPERIOR_NASAL_RESTRICTION: 0
OS_INFERIOR_NASAL_RESTRICTION: 0
OS_INFERIOR_TEMPORAL_RESTRICTION: 0
OS_NORMAL: 1
OD_NORMAL: 1
OS_SUPERIOR_NASAL_RESTRICTION: 0
OD_INFERIOR_NASAL_RESTRICTION: 0

## 2024-04-12 ASSESSMENT — REFRACTION_CURRENTRX
OS_DIAMETER: 14.9
OD_BRAND: ONEFIT
OD_SPHERE: -0.62
OS_SPHERE: +0.50
OD_ADDL_SPECS: OPT EXTRA CLEAR, HYDRAPEG
OS_ADDL_SPECS: OPT EXTRA BLUE, HYDRAPEG
OS_BRAND: ONEFIT
OD_DIAMETER: 14.9
OS_BASECURVE: 8.2
OD_BASECURVE: 8.1

## 2024-04-12 ASSESSMENT — PAIN SCALES - GENERAL: PAINLEVEL: NO PAIN (0)

## 2024-04-12 ASSESSMENT — VISUAL ACUITY
METHOD: SNELLEN - LINEAR
OS_CC: 20/20
VA_OR_OS_CURRENT_RX: 20/20
OD_CC: 20/20
CORRECTION_TYPE: CONTACTS
OD_CC+: -1
VA_OR_OD_CURRENT_RX: 20/20
OD_CC: 20/20
OS_CC: 20/20
OS_CC+: -1

## 2024-04-12 ASSESSMENT — EXTERNAL EXAM - LEFT EYE: OS_EXAM: DERMATOCHALASIS

## 2024-04-12 ASSESSMENT — TONOMETRY
IOP_METHOD: ICARE
OS_IOP_MMHG: 10
OD_IOP_MMHG: 9

## 2024-04-12 ASSESSMENT — EXTERNAL EXAM - RIGHT EYE: OD_EXAM: DERMATOCHALASIS

## 2024-04-12 NOTE — PROGRESS NOTES
Infusion Nursing Note:  Nik Nava presents today for scheduled infusion.    Patient seen by provider today: No   present during visit today: Not Applicable.    Note: Patient arrived for scheduled IVIG. Nik is feeling well today. Experiencing occasionally SOB with exertion but manageable at this time. Denies chest pain, N/V, weakness, fever. ONC toxicity assessment completed. Home medications and allergies reviewed. Premedicated with tylenol 650 mg PO, Benadryl 50 mg PO, and solu Cortef 100 mg IVP.  IVIG titrated per order. Max rate infusion 3.5 mL/kg/hr. Tolerated without complication. VSS throughout.       Intravenous Access:  Implanted Port.    Treatment Conditions:  IGG level collected prior to infusion.      Post Infusion Assessment:  Patient tolerated infusion without incident.  Blood return noted pre and post infusion.  Site patent and intact, free from redness, edema or discomfort.  No evidence of extravasations.  Access discontinued per protocol.       Discharge Plan:   Patient discharged in stable condition accompanied by: wife.  Departure Mode: Ambulatory.      Emiliana Weinstein RN

## 2024-04-15 LAB — IGG SERPL-MCNC: 352 MG/DL (ref 610–1616)

## 2024-04-22 DIAGNOSIS — Z94.81 S/P BONE MARROW TRANSPLANT (H): ICD-10-CM

## 2024-04-22 DIAGNOSIS — Z94.81 STATUS POST BONE MARROW TRANSPLANT (H): Primary | ICD-10-CM

## 2024-04-22 PROCEDURE — 94640 AIRWAY INHALATION TREATMENT: CPT | Performed by: INTERNAL MEDICINE

## 2024-04-22 PROCEDURE — 94642 AEROSOL INHALATION TREATMENT: CPT | Performed by: INTERNAL MEDICINE

## 2024-04-22 NOTE — PROGRESS NOTES
Nik Nava was seen today for a Pentamidine nebulizer tx ordered by Dr. Avila Tobar.    Patient was first given 2.5 mg of albuterol nebulizer (NDC 24460-078-05, Lot # [id number], Exp 08/31/2025) after which Pentamidine 300 mg (NDC 91620-032-72, Lot # [id number], Exp 06/2025) mixed with 6cc Sterile H20 was administered through a filtered nebulizer.    Pre-treatment: SpO2 = 97%   HR = 81   BBS = Clear   Post-treatment: SpO2 = 98%  HR = 92  BBS = Clear    No adverse side effects noted by the patient.    This service today was provided under Dr. Castrejon, who was available if needed.     Procedure was completed by Chapo Geller.

## 2024-04-23 RX ORDER — PENTAMIDINE ISETHIONATE 300 MG/300MG
300 INHALANT RESPIRATORY (INHALATION)
OUTPATIENT
Start: 2024-04-23 | End: 2024-04-23

## 2024-04-23 RX ORDER — ALBUTEROL SULFATE 0.83 MG/ML
2.5 SOLUTION RESPIRATORY (INHALATION)
OUTPATIENT
Start: 2024-04-23 | End: 2024-04-23

## 2024-04-24 DIAGNOSIS — D89.813 GVHD AS COMPLICATION OF BONE MARROW TRANSPLANT (H): ICD-10-CM

## 2024-04-24 DIAGNOSIS — T86.09 GVHD AS COMPLICATION OF BONE MARROW TRANSPLANT (H): ICD-10-CM

## 2024-04-24 RX ORDER — PREDNISONE 5 MG/1
5 TABLET ORAL DAILY
Qty: 30 TABLET | Refills: 1 | Status: SHIPPED | OUTPATIENT
Start: 2024-04-24 | End: 2024-09-06

## 2024-05-08 DIAGNOSIS — D89.813 GVHD AS COMPLICATION OF BONE MARROW TRANSPLANT (H): Primary | ICD-10-CM

## 2024-05-08 DIAGNOSIS — T86.09 GVHD AS COMPLICATION OF BONE MARROW TRANSPLANT (H): Primary | ICD-10-CM

## 2024-05-08 DIAGNOSIS — Z94.81 STATUS POST BONE MARROW TRANSPLANT (H): ICD-10-CM

## 2024-05-09 ENCOUNTER — TELEPHONE (OUTPATIENT)
Dept: DERMATOLOGY | Facility: CLINIC | Age: 64
End: 2024-05-09
Payer: MEDICARE

## 2024-05-09 ENCOUNTER — LAB (OUTPATIENT)
Dept: LAB | Facility: CLINIC | Age: 64
End: 2024-05-09
Attending: INTERNAL MEDICINE
Payer: MEDICARE

## 2024-05-09 ENCOUNTER — ONCOLOGY VISIT (OUTPATIENT)
Dept: TRANSPLANT | Facility: CLINIC | Age: 64
End: 2024-05-09
Attending: INTERNAL MEDICINE
Payer: MEDICARE

## 2024-05-09 VITALS
WEIGHT: 258 LBS | SYSTOLIC BLOOD PRESSURE: 121 MMHG | HEART RATE: 97 BPM | DIASTOLIC BLOOD PRESSURE: 78 MMHG | RESPIRATION RATE: 16 BRPM | BODY MASS INDEX: 34.19 KG/M2 | TEMPERATURE: 98 F | OXYGEN SATURATION: 98 % | HEIGHT: 73 IN

## 2024-05-09 DIAGNOSIS — D89.813 GVHD AS COMPLICATION OF BONE MARROW TRANSPLANT (H): Primary | ICD-10-CM

## 2024-05-09 DIAGNOSIS — T86.09 GVHD AS COMPLICATION OF BONE MARROW TRANSPLANT (H): ICD-10-CM

## 2024-05-09 DIAGNOSIS — C91.01 ACUTE LYMPHOBLASTIC LEUKEMIA (ALL) IN REMISSION (H): ICD-10-CM

## 2024-05-09 DIAGNOSIS — Z94.81 STATUS POST BONE MARROW TRANSPLANT (H): ICD-10-CM

## 2024-05-09 DIAGNOSIS — T86.09 GVHD AS COMPLICATION OF BONE MARROW TRANSPLANT (H): Primary | ICD-10-CM

## 2024-05-09 DIAGNOSIS — D89.813 GVHD AS COMPLICATION OF BONE MARROW TRANSPLANT (H): ICD-10-CM

## 2024-05-09 DIAGNOSIS — E89.0 POSTABLATIVE HYPOTHYROIDISM: ICD-10-CM

## 2024-05-09 LAB
ALBUMIN SERPL BCG-MCNC: 4.3 G/DL (ref 3.5–5.2)
ALP SERPL-CCNC: 77 U/L (ref 40–150)
ALT SERPL W P-5'-P-CCNC: 30 U/L (ref 0–70)
ANION GAP SERPL CALCULATED.3IONS-SCNC: 13 MMOL/L (ref 7–15)
AST SERPL W P-5'-P-CCNC: 33 U/L (ref 0–45)
BASOPHILS # BLD AUTO: 0 10E3/UL (ref 0–0.2)
BASOPHILS NFR BLD AUTO: 0 %
BILIRUB SERPL-MCNC: 0.6 MG/DL
BUN SERPL-MCNC: 34.1 MG/DL (ref 8–23)
CALCIUM SERPL-MCNC: 10.2 MG/DL (ref 8.8–10.2)
CHLORIDE SERPL-SCNC: 101 MMOL/L (ref 98–107)
CREAT SERPL-MCNC: 1.2 MG/DL (ref 0.67–1.17)
DEPRECATED HCO3 PLAS-SCNC: 24 MMOL/L (ref 22–29)
EGFRCR SERPLBLD CKD-EPI 2021: 67 ML/MIN/1.73M2
EOSINOPHIL # BLD AUTO: 0 10E3/UL (ref 0–0.7)
EOSINOPHIL NFR BLD AUTO: 0 %
ERYTHROCYTE [DISTWIDTH] IN BLOOD BY AUTOMATED COUNT: 15.8 % (ref 10–15)
GLUCOSE SERPL-MCNC: 106 MG/DL (ref 70–99)
HCT VFR BLD AUTO: 34.4 % (ref 40–53)
HGB BLD-MCNC: 11.7 G/DL (ref 13.3–17.7)
IMM GRANULOCYTES # BLD: 0 10E3/UL
IMM GRANULOCYTES NFR BLD: 0 %
LYMPHOCYTES # BLD AUTO: 2.9 10E3/UL (ref 0.8–5.3)
LYMPHOCYTES NFR BLD AUTO: 30 %
MCH RBC QN AUTO: 35.2 PG (ref 26.5–33)
MCHC RBC AUTO-ENTMCNC: 34 G/DL (ref 31.5–36.5)
MCV RBC AUTO: 104 FL (ref 78–100)
MONOCYTES # BLD AUTO: 0.7 10E3/UL (ref 0–1.3)
MONOCYTES NFR BLD AUTO: 7 %
NEUTROPHILS # BLD AUTO: 5.9 10E3/UL (ref 1.6–8.3)
NEUTROPHILS NFR BLD AUTO: 63 %
NRBC # BLD AUTO: 0 10E3/UL
NRBC BLD AUTO-RTO: 0 /100
PLATELET # BLD AUTO: 219 10E3/UL (ref 150–450)
POTASSIUM SERPL-SCNC: 4.6 MMOL/L (ref 3.4–5.3)
PROT SERPL-MCNC: 6.8 G/DL (ref 6.4–8.3)
RBC # BLD AUTO: 3.32 10E6/UL (ref 4.4–5.9)
SODIUM SERPL-SCNC: 138 MMOL/L (ref 135–145)
T4 FREE SERPL-MCNC: 1.52 NG/DL (ref 0.9–1.7)
TSH SERPL DL<=0.005 MIU/L-ACNC: 1.32 UIU/ML (ref 0.3–4.2)
WBC # BLD AUTO: 9.5 10E3/UL (ref 4–11)

## 2024-05-09 PROCEDURE — 250N000011 HC RX IP 250 OP 636: Performed by: INTERNAL MEDICINE

## 2024-05-09 PROCEDURE — 84155 ASSAY OF PROTEIN SERUM: CPT

## 2024-05-09 PROCEDURE — 36591 DRAW BLOOD OFF VENOUS DEVICE: CPT

## 2024-05-09 PROCEDURE — 90471 IMMUNIZATION ADMIN: CPT | Performed by: INTERNAL MEDICINE

## 2024-05-09 PROCEDURE — 84439 ASSAY OF FREE THYROXINE: CPT

## 2024-05-09 PROCEDURE — G0009 ADMIN PNEUMOCOCCAL VACCINE: HCPCS | Performed by: INTERNAL MEDICINE

## 2024-05-09 PROCEDURE — 90472 IMMUNIZATION ADMIN EACH ADD: CPT | Performed by: INTERNAL MEDICINE

## 2024-05-09 PROCEDURE — 84443 ASSAY THYROID STIM HORMONE: CPT

## 2024-05-09 PROCEDURE — 85025 COMPLETE CBC W/AUTO DIFF WBC: CPT

## 2024-05-09 PROCEDURE — 250N000021 HC RX MED A9270 GY (STAT IND- M) 250: Performed by: INTERNAL MEDICINE

## 2024-05-09 PROCEDURE — G0463 HOSPITAL OUTPT CLINIC VISIT: HCPCS | Mod: 25 | Performed by: INTERNAL MEDICINE

## 2024-05-09 PROCEDURE — 82784 ASSAY IGA/IGD/IGG/IGM EACH: CPT

## 2024-05-09 PROCEDURE — 86359 T CELLS TOTAL COUNT: CPT

## 2024-05-09 PROCEDURE — 90677 PCV20 VACCINE IM: CPT | Performed by: INTERNAL MEDICINE

## 2024-05-09 PROCEDURE — 99215 OFFICE O/P EST HI 40 MIN: CPT | Performed by: INTERNAL MEDICINE

## 2024-05-09 PROCEDURE — 84075 ASSAY ALKALINE PHOSPHATASE: CPT

## 2024-05-09 PROCEDURE — 90697 DTAP-IPV-HIB-HEPB VACCINE IM: CPT | Performed by: INTERNAL MEDICINE

## 2024-05-09 PROCEDURE — 86360 T CELL ABSOLUTE COUNT/RATIO: CPT

## 2024-05-09 RX ORDER — ACYCLOVIR 400 MG/1
800 TABLET ORAL EVERY 12 HOURS
Qty: 360 TABLET | Refills: 3 | Status: SHIPPED | OUTPATIENT
Start: 2024-05-09

## 2024-05-09 RX ORDER — ALBUTEROL SULFATE 0.83 MG/ML
2.5 SOLUTION RESPIRATORY (INHALATION)
OUTPATIENT
Start: 2024-05-09 | End: 2024-05-09

## 2024-05-09 RX ORDER — HEPARIN SODIUM (PORCINE) LOCK FLUSH IV SOLN 100 UNIT/ML 100 UNIT/ML
5 SOLUTION INTRAVENOUS ONCE
Status: COMPLETED | OUTPATIENT
Start: 2024-05-09 | End: 2024-05-09

## 2024-05-09 RX ORDER — PENTAMIDINE ISETHIONATE 300 MG/300MG
300 INHALANT RESPIRATORY (INHALATION)
OUTPATIENT
Start: 2024-05-09 | End: 2024-05-09

## 2024-05-09 RX ADMIN — Medication 5 ML: at 14:05

## 2024-05-09 RX ADMIN — PNEUMOCOCCAL 20-VALENT CONJUGATE VACCINE 0.5 ML
2.2; 2.2; 2.2; 2.2; 2.2; 2.2; 2.2; 2.2; 2.2; 2.2; 2.2; 2.2; 2.2; 2.2; 2.2; 2.2; 4.4; 2.2; 2.2; 2.2 INJECTION, SUSPENSION INTRAMUSCULAR at 16:16

## 2024-05-09 RX ADMIN — DIPHTHERIA AND TETANUS TOXOIDS AND ACELLULAR PERTUSSIS, INACTIVATED POLIOVIRUS, HAEMOPHILUS B CONJUGATE AND HEPATITIS B VACCINE 0.5 ML: 15; 5; 20; 20; 3; 5; 29; 7; 26; 10; 3 INJECTION, SUSPENSION INTRAMUSCULAR at 16:15

## 2024-05-09 ASSESSMENT — PAIN SCALES - GENERAL: PAINLEVEL: NO PAIN (0)

## 2024-05-09 NOTE — TELEPHONE ENCOUNTER
[facility] Call Center    Phone Message    May a detailed message be left on voicemail: yes     Reason for Call: Symptoms or Concerns     Current symptom or concern: spots of concern    Symptoms have been present for:  6 month(s)    Has patient previously been seen for this? No    By : NA    Date: NA    Are there any new or worsening symptoms? Yes: They started on one arm and seem to be spreading to the other arm.       Bone Marrow Tx patient. Patient's wife called to schedule appt. For skin check. We were unable to find anything at [facility] in next 14 days per protocol. Please call patient or wife back to discuss setting up an appointment. Thanks       Action Taken: Message routed to:  [facility] ([facility] DERM    Travel Screening: Not Applicable

## 2024-05-09 NOTE — NURSING NOTE
Chief Complaint   Patient presents with    Port Draw     Labs collected from port by RN. Vitals taken. Checked in for appointment(s).      Port accessed with 20 gauge 3/4 inch flat needle by RN, labs collected, line flushed with saline and heparin.  Vitals taken. Pt checked in for appointment(s).     Usha Sanabria RN

## 2024-05-09 NOTE — PROGRESS NOTES
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 Recent oral HSV and oral cGVHD flare treated with prednisone.  Feb 2024 Bronchitis.  Discharged last week from local admission for RSV PNA.       HPI: Nik returns to clinic today for the most part doing well.  He has recovered from the viral infections that he had back in February and March, and he said no further infectious events.  His GVHD symptoms for the most part are stable although he does note some soreness around his lips and he has been using the tacrolimus cream.  His eyes remain dry but stable, he is eating and drinking well, he has no change in bowel habits, and no active skin issues.  He is tolerating his medications well.  His primary complaint is fatigue.  He is tired on most days and has difficulty going out for any significant walking or activity.  He has noted significant weight gain and for the most part he sleeps well but not consistently.  He does have a history of sleep apnea but does not wish to use his CPAP device, he has visited oncology rehab in the past but tired of the exercises and other activities.  In the past he has been noted to have mildly low testosterone levels but has been recommended against testosterone replacement    Review of Systems                                                                                                                                         10 point Review of systems was negative except as detailed above     PHYSICAL EXAM                                                                                                                                                   KPS: 70    Wt Readings from Last 4 Encounters:   05/09/24 117 kg (258 lb)   04/02/24 116 kg (255 lb 12.8 oz)   03/13/24 115 kg (253 lb 8 oz)   02/27/24 113.4 kg (249 lb 14.4 oz)      General: NAD.  HEENT: sclera anicteric,  injected conjunctivae.    11/14/2023 No noted lichenoid changes in the oral mucosa previous prominent area of healed ulcers in the right/left mid buccal mucosa are less prominent with overall improved appearence and more normal appearing mucosal tissue, no ulcers seen.    1/9/2024 mild lichenoid changes and healing ulcers on both buccal mucosa no open discolored lesions noted  2/27/2024  no lichenoid changes, healing ulcers/minimal scaring on both buccal mucosa  4/2/2024  no lichenoid changes, healing ulcers/minimal scaring on both buccal mucosa  Lungs:  CTA b/l  no wheezes  CV: RRR  no gallop  Abd; soft, NABS, protuberant  Ext/ Skin: Skin bruising on bilateral forearms,  fainting of previous hyperpigmented areas of previously known cGVHD  LE trace bilateral edema in lower extremities- wearing compression socks    cGVHD therapy started on February 24 2022  - Baseline NIH scoring 2/24/2022 : skin 2 maculopapular rash/erythema with no sclerotic features, mouth score 1 mild symptoms with lichenoid changes less than 25%, eyes score 2 moderate symptoms without new visual impairments due to KCS requiring lubricant eyedrops more than 3 times a day, overall mild scale for her provider  - 3/25/2022 1 month NIH treatment assessment on PQRST: skin 2, mouth score 1 mild symptoms with NO lichenoid changes, eyes score 2 moderate symptoms w requiring lubricant eyedrops more than 3 times a day, liver score 1 ALT 3.7x ULN and nl bilirubin   - 3/31  Mouth clear; eyes minimal LFT still abn; no joint or new GI sx  - 4/28 Mouth clear, Left>R eye is mild sclera erythema, lids are pink and irritated appearing, LFT's slow improvement, no new joint, skin, or GI symptoms.   -5/11 mouth with mild erythema but no lichenoid changes, eyes using eyedrops 4-6 times a day score of 2, LFTs significantly improved from baseline slight elevation in alk phos and AST with normal bilirubin, skin with hyperpigmentation from old acute GVHD no active skin  rashes, no GI symptoms no musculoskeletal complaints.  -5/26 Mouth with very slight lichenoid changes, erythema.  Eyes still using eyedrops 4-6x per day.  LFTs essentially normal with slight elevation in alk phos.  Skin with hyperpigmentation from old acute GVHD, no active rashes, no GI or MSK concerns.  Skin 0, mouth 0 but with lichenoid changes, eyes 2  -6/7/22 Mouth with no lichenoid changes, erythema.  Eyes still using eyedrops 4-6x per day.  LFTs essentially normal with slight elevation in alk phos.  Skin with hyperpigmentation from old acute GVHD, no active rashes, no GI or MSK concerns.  Skin 0, mouth 0, eyes 2     -6 month PQRST assessment 9/6/2022: eyes 3, mouth 0 for symptoms, 25% lichenoid changes (1), liver/GI/skin 0    11/8/2022, 11/22/2022, 12/13/22 cGVHD NIG scoring eyes 3, no other organ involvement clinically  3/28/23 cGVHD NIG scoring eyes 3, no other organ involvement clinically  5/2/23 cGVHD NIG scoring eyes 3, oral 1, no other organ involvement clinically  6/13/23 cGVHD NIG scoring eyes 3, oral 1, no other organ involvement clinically  8/15/23 cGVHD NIG scoring eyes 3 (down to 3-4 times eye drop from >10 but still using scleral lenses), oral 1, no other organ involvement clinically  10/10/2023 cGVHD NIG scoring eyes 3 (down to 3-4 times eye drop from >10 but still using scleral lenses), oral 1, no other organ involvement clinically  11/14/2023 cGVHD NIG scoring eyes 3 (down to 3-4 times eye drop from >10 but still using scleral lenses), oral 1, no other organ involvement clinically  1/9/2024 cGVHD NIG scoring eyes 3 (down to 3-4 times eye drop from >10 but still using scleral lenses), oral 2, no other organ involvement clinically  2/27/2024 cGVHD NIG scoring eyes 3 (down to 3-4 times eye drop from >10 but still using scleral lenses), oral 1, no other organ involvement clinically  4/22024 cGVHD NIG scoring eyes 3 (down to 2 times eye drop from >10 but still using scleral lenses), oral 1, no other  organ involvement clinically    Lab:    Lab Results   Component Value Date    WBC 9.5 05/09/2024    ANEU 3.5 10/26/2021    HGB 11.7 (L) 05/09/2024    HCT 34.4 (L) 05/09/2024     05/09/2024     04/02/2024    POTASSIUM 4.5 04/02/2024    CHLORIDE 101 04/02/2024    CO2 23 04/02/2024     (H) 04/02/2024    BUN 34.0 (H) 04/02/2024    CR 1.13 04/02/2024    MAG 2.0 11/14/2023    INR 0.90 12/01/2022    BILITOTAL 0.8 04/02/2024    AST 37 04/02/2024    ALT 32 04/02/2024    ALKPHOS 94 04/02/2024    PROTTOTAL 6.7 04/02/2024    ALBUMIN 3.9 04/02/2024 4/28/2023    MRI Lumbar spine  1.  The previously seen ventral epidural abscess has resolved with small amount of residual ventral epidural phlegmon.   2.  Marrow edema about the L5-S1 endplates has mildly worsened within new rim-enhancing subchondral lesion in the left S1 superior endplate. This could reflect progression of osteomyelitis.      MRI hip right  1.  No evidence of septic arthritis of the right hip joint.   2.  There is degeneration and likely tearing of the right acetabular labrum anterosuperiorly, and probably low-grade chondromalacia of the right hip joint.   3.  Abnormality at L5-S1 compatible with known discitis-osteomyelitis.    ASSESSMENT AND PLAN   Nik Nava is a 64 year old male with Ph Pos ALL, day 1003 s/p sib allo stem cell transplant    Day -6 (8/4): flu/cy  Day -5 through day -2 (8/5-8/8): flu  Day -1 (8/9): TBI  Day 0 (8/10): transplant     1.  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 S/p allo sib PBSCT. (ABO matched)  HCT-CI score: 3 (prior solid tumor)  Day +100 bone marrow biopsy is 100% donor with no morphologic or flow cytometric evidence of leukemia BCR abl is pending.  - Day +180 restaging BM bx- still in CR  100% donor;  2/16/22 BCR ABL major breakpoint undetectable.   - 1 year restaging 8/18/2022: Markedly hypocellular bone marrow [less than 10% cellular] with maturing trilineage  hematopoiesis and no definite morphologic or immunophenotypic evidence for involvement by B lymphoblastic leukemia. BCRABL1 PCR not detected, bone marrow % donor. FISH NORMAL No evidence of BCR-ABL1 fusion  - Maintenance with TKI posttransplantation given his Ph positive ALL that have not been started previously presumed secondary to multiple active issues specifically infections and COVID.  Per Avila Tobar: will reconsider this patient will think about whether this is something he would like to consider he was previously on Dasatinib, we would start on a low dose and increase as tolerated.  This is on hold now pending active GVHD therapy, we discussed on this visit 10/18 in agreement with holding off.  - 2 year restaging 8/7/2023 BMB: - No morphologic or immunophenotypic evidence of recurrent/persistent B-lymphoblastic leukemia. Slightly hypocellular marrow (10-20% estimated cellularity, patchy and variable), with trilineage hematopoetiic maturation and less than 2% blasts. Peripheral blood showing slight normocytic normochromic anemia and slight thrombocytopenia. BM % donor. FISH No evidence of BCR::ABL1 fusion /CG No recipient (male) cells  or cells with a t(9;22) or other clonal chromosomal abnormality were  detected among the 20 metaphases analyzed.      2.  HEME: CBC stable.   3/2023 iron low 28, iron saturation index 10%, transferrin 225, ferritin 1381 down trending (in retrospect that was the time of epidural abscess as well).  B12, folate normal.  High copper and zinc borderline low, 5/2023 started zinc.  Started iron replacement in 4/2023, recheck iron profile on follow up more consistent with AOCD, Iron 11/14/23 WNL. Note ferritin elevation is in the setting of discitis/OM, 4/2 down to 1500 but recovering from RSV with nl iron profile, low threshold to obtian ferriscan to objectively assess for iron overload.     3.  FEN/Renal: Creatinine 0.97, s/p nephrectormy, nephrology following     4.     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   4/2/2024 cGVHD NIG scoring eyes 3, oral 1, no other organ involvement clinically     #Skin GVHD- history of biopsy proven GVHD of the skin 8/30/2021; resolved.   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 WNL today   # Ocular GVHD: follows with ophthalmology. Maxitrol to lids, continue refreshing drops QID. Score 3, but down to 2 times eye drops from >10/day, using scleral lenses  Followed closely by Dr. Juarez with significant improvement with scleral lenses and eyedrops  # Oral GVHD: dex s/s 2x.  Lichenoid changes have largely resolved with no open ulcers (December 2023 for flare of oral GVHD in the setting of oral herpes reactivation)    Previous therapies  Tacrolimus-previously decided to stay on same dose, no checks of levels if clinically stable, and no need switch to sirolimus. (keep tac low as gvhd is quiet and viremia). 5/10 level 45 with starting of COVID therapy and D-D intractions (Paxlovid for COVID19, which has a drug interaction with tacrolimus-Ritonavir increases serum levels of tacrolimus).   Tacrolimus level subtherapeutic, increase to 2.5 mg twice daily recently, 8/23/2022 instructed to change tacrolimus to 2 mg twice daily. 9/6 with mild NEGRA, hyperkalemia, hypomagnesemia, and reports some tremors and cramps, suspect tacrolimus to be high therefore empirically decreased to 1.5 twice daily, skip morning dose of tacrolimus and took 2 mg today after his afternoon labs. Decrease to 1mg BID on Saturday.  Sirolimus  9/21 despite fluids and a dose adjustment of tacrolimus patient seem to have persistence NORBERTO related NEGRA, therefore after discussion with the patient decision was made to transition to sirolimus that was started on 9/21, patient initially seem to tolerate this well with normalization of kidney function  10/11 presented with flares of ocular and oral GVHD, fluid retention and  gain of 5-6 kg, lower extremity edema, abdominal distention, total protein to creatinine ratio elevated at 0.4, in addition to elevation in BUN and creatinine, HTN.  Picture most consistent with sirolimus related side effects.  Less likely that the patient will tolerate even a lower level of sirolimus.  Therefore the patient was instructed to stop sirolimus, last dose on 10/10. 10/14 level 3.5 appropriately trending down.     Corticosteroids  3/1-3/16: prednisone 100mg every day  3/17 75mg every day   3/31 75 alt with 50  4/6: 75 alt with 25mg every other day  4/12 pred tapered to 60 alternating with 25mg   4/15 In setting of viruses trying to reactivate,GVHD stable: Taper 60/15mg alternating.  4/20 decrease pred further to 50/10.    4/25 taper pred to 50/0 every other day as long as symptoms stable.   5/2 Pred decrease 40/0 alternating every other day.   5/13 decrease to 30/0  5/20 decrease to 20/0 then slowly by 5 mg weekly  6/7 decrease to 10/0  Went up to 15/0 with mild increased LFTs  8/23/2022 increased to 20/0, with persistence of oral ulcers and active ocular GVHD.  Discussed with the patient the importance of stopping chewing of nicotine gums for prolonged hours as that would not help with the healing of the oral ulcers.  I discussed with the patient alternatives of nicotine patches that he will reconsider and let me know.  9/6 decreased to 15 alternating with 0  9/13 decreased to 10 alternating with 0  9/21 discontinued tacrolimus given persistent NEGRA and single kidney and start the patient on sirolimus without loading dose.  We will get a list of possible side effects and adverse events from the Pharm.D. see sirolimus section above.  10/11 GVHD flare. Sirolimus stopped. Resume prednisone 40 mg daily as of 10/12, no change with mildly worsening eye pain 10/14  Reduce pred to 35mg 10/25 and 25mg 11/1 11/8 20 mg   11/22 15 mg  12/13/22 10 mg (plan to taper to 5mg then slow taper 1 mg per month given long  pred history)  2/23/23 lower from 5 mg to 4 mg for the next month  3/28/2023 - 5/2/2023- 8/15/2023 continue on 10/4 given adrenal insufficieny with recent am cortisol check and clinical symptoms on taper to lower dose of 4 mg daily  -12/2023 had oral GVHD flare started on Prednisone 40 mg,  start taper to 40 and 30 mg alternating for 1 week then 30 mg daily for 1 week, then 30 and 20 alternating for 1 week, then 20 for 1 week till he sees me.  - 2/27/2024 decrease from 10/20mg prednisone to 10/15 then 10/10 -flared and now on 15mg with   - 4/2/2024 continue 15mg    Belumosudil  Patient intolerant/with disease flares on tacrolimus and sirolimus see above.  Discussed with the patient the different options for therapy of chronic GVHD that are FDA approved.  Given the side effect profiles after discussing in detail and given the least nephrotoxicity and expected response, taking into account other metabolic effect as well.  We will proceed with prior authorization with belumosudil.  Patient was given material to review for possible expected adverse events and side effects with both Belumosodil and ruxolitinib.   --- Started on 10/18/2022 - skin/liver/oral GVHD inactive, and occular symptoms controlled/symptomatic improvement.   --- 10/10/2023 will hold belumosodil given recurrent infections ans significant improvement in symptoms with no end organ damage after discussions with him and his wife. Will optimize topical treatment with scleral lenses, eyedrops ans dex s/sp int he interim to avoid flares. Will readdress need to restart systemic treatment pending infectious resolution and symptoms.      5.  ID:   # Recent history of Epidural abscess: Followed by Dr. Candelario ID, plan to be on IV ceftriaxone for at least 6 weeks course through 5/11/2023-to be determined on further follow-up.  See imaging with MRI follow-up as above.     #Recurrent discitis/OM still on treatment through October 2023, cx negative, managed with ID  locally.  3/30/2023 blood culture with Staph epidermidis  3/31/2023 BONE, L5, FLUOROSCOPICALLY-GUIDED NEEDLE BIOPSY: Benign bone with trilineage hematopoiesis (normal) Negative for neoplasm Negative for acute osteomyelitis. DISC, L5-S1, ASPIRATION FOR CYTOLOGY: Acellular debris; no cellular material present for evaluation     #History of COVID x2 last 1/2023 and influenza    Treated with Paxlovid with persistent fatigue but no active respiratory symptoms  received his influenza annual vaccine as well as COVID boosters locally 11/16/2022     #History of pulmonary infiltrates:   4/20 CT chest with new RUL infiltrate. Highly immunocompromised. 4/22 Fungitell/asp GM were neg. BAL 4/29 NGTD, increased micafungin to 150mg IV daily-- f/u 6/1 Dr Garcia CT with mixed results, followed with Dr. Garcia August 2022 with resolution of pulmonary nodules and normalization of LFTs switched patient to posaconazole prophylactic dose and monitor LFTs and any infectious concerns closely. Monitor and low threshold check CT or add back if on higher dose steroids.     #History of CMV viremia:    - CMV viremia up to 1100. 4/15 started valcyte 900mg PO BID. 4/20 CMV <137, 5/10 ND, decreased to 450mg BID 4/30 - continue 4 weeks as long as CMV <137 till 5/28 + weekly CMV. Switched to acyclovir after completion of therapy 5/28/2022. recheck 3/1/23 ND    # History of Oral herpes  12/2023:HSV oral lesions; PCR positive. Stopped ACV last month and lesions appears soon thereafter. Empirically Rx Valtrex. mild lingering URI sx; RVP + rhino (12/21); supportive cares. (CCT 12/1/23 neg).started on pred 40mg/d.  Completed Valtrex course and put back on acyclovir restarted 1/20/2024    # History of PNA/bronchitis 2/9/2024 sp doxycyline    # History of RSV PNA 3/26/2024, s/p ribavirin    #Hypogammaglobulinemia with recurrent infections: will check prior auth for IVIG and confirm OK with nephrology       - PPx:   ---ACV  Patient developed oral herpes  reactivation after stopping acyclovir therefore resumed on 1/9/2023  ---Posaconazole (previously on micafungin with LFts abl, hx of pulmonary nodules needign treatment dose). 11/14/2023 discontinue and check CT in 2 months given history completed December 2023 with no concerns for infections or nodular lesions. Monitor and low threshold check CT or add back if on higher dose steroids.  ---Pentamidine, future 4/22/2024  ---On Penicillin 250 bid px     - EBV viremia: 4/20 CAP CT (w/ contrast): No adenopathy. S/p 4/22 and 5/4/22 rituximab 375mg/m2 5/10 ND x2, 3/13/2024     -  - vaccinations: Previously deferred annual vaccines in the setting of GVHD and immunosuppression therapy. 11/14/2023 we will start vaccination series, including Shingrix, COVID-vaccine. 1/9/2023 Shingrix vaccine.  Needs boosters on next follow up given today RSV recovery    6. Endo:   - Hx of graves disease; On synthroid follows with endocrine  - HLD: 11/2022 cholesterol 355, triglycerides 153, -- 11/8 started rosuvastatin 5 mg daily, 11/22 CPK within normal, 5/2/2023 repeat lipid profile cholesterol down to 202, triglycerides 191, LDL now normal at 95.  We will continue same dose of rosuvastatin and add fish oil 1g BID (on hold). Repeated August with PCP, who added back fish oil    7. CV:   - Hypertension history   - TTE most recently 10/2022, ECHO in [facility] 2024 at [facility]     8. GI:   -Omeprazole for heartburn  -LFTs as above, now normal. Off ursodiol     9. Psych:   - Situational anxiety - lexapro 10mg daily.   - Insomnia: worse on steroids. Ativan, trazadone, melatonin     10. MSK:   -History of left food drop, PT. Occasional muscle cramps discussed optimizing vitamin D levels and considering vitamin D, some of the symptomatology of muscle cramps are likely related to chronic GVHD.  No muscle cramps reported today.  -4/2023 new tearing of the right acetabular labrum anterosuperiorly referred to to orthopedic surgery.       11.  HCM/Age appropriate cancer screening:  -Discussed with the patient importance of age-appropriate cancer screening including colonoscopies, he is due for this.  -Discussed importance of at least once a year vitamin D level check, 8/18/2022 wnl  -Screening for secondary iron overload, see heme section above  -Yearly TSH 2022 wnl; yearly lipid panel - see GI section above  -9/6/2022 DEXA scan Based on BMD diagnosis is consistent with normal bone density based on WHO criteria Ref. 1   -PFTs 9/20/22, see above. 9/21/2023 PFTs The FVC, FEV1, FEV1/FVC ratio and GVN61-43% are within normal limits. FVC 99% (108), FEV1 91%, DLCO uncorrected 91%.  Repeat PFTs in 4 to 6 months.  -Metabolic other, 8/2022 testosterone within normal  -11/22/2022 discussed with the patient the challenges and the stresses of chronic illness and healthcare fatigue.  Patient had previously been on Lexapro that we restarted confirmed with pharmacy that there are no drug drug interactions.  Additionally we will referred patient to PMNR to help with physical rehab and strength to help with fatigue.  Our social workers will also reach out to Nik and his wife for further resources including support groups for patients with chronic illness and fatigue post transplant.  -Referral to palliative care for symptom and pain management including insomnia     Summary: I recommended that he reconsider a sleep evaluation because of the potential effect of sleep apnea on sleep quality and the resulting effect on daytime sleepiness and fatigue.  He is open to the idea although somewhat resistant to using the device.  I pointed out that there may be new devices or other ways to make this more tolerable.  He had difficulty with prednisone doses lower than 15 mg and we agreed to continue the current dosing.    Plan:  - continue prednisone 15  - sleep clinic eval ordered   - check CD4 count, if over 200 cancel pentamidine  - continue dex swish and spit daily PRN  - RTC  6-8 weeks  - IVIG if IgG lower than 400  - continue f/unit(s) for endo, neph, PCP    The longitudinal plan of care for the diagnosis(es)/condition(s) as documented were addressed during this visit. Due to the added complexity in care, I will continue to support Nik in the subsequent management and with ongoing continuity of care.      45 minutes spent on the date of the encounter doing chart review, history and exam, lab review, documentation and coordniating care.    DOMINIQUE BRITTON MD  May 10, 2024

## 2024-05-09 NOTE — NURSING NOTE
"Oncology Rooming Note    May 9, 2024 3:05 PM   Nik Nava is a 64 year old male who presents for:    Chief Complaint   Patient presents with    Port Draw     Labs collected from port by RN. Vitals taken. Checked in for appointment(s).     Oncology Clinic Visit     [facility] RETURN - ALL     Initial Vitals: /78   Pulse 97   Temp 98  F (36.7  C)   Resp 16   Ht 1.854 m (6' 0.99\")   Wt 117 kg (258 lb)   SpO2 98%   BMI 34.05 kg/m   Estimated body mass index is 34.05 kg/m  as calculated from the following:    Height as of this encounter: 1.854 m (6' 0.99\").    Weight as of this encounter: 117 kg (258 lb). Body surface area is 2.45 meters squared.  No Pain (0) Comment: Data Unavailable   No LMP for male patient.  Allergies reviewed: Yes  Medications reviewed: Yes    Medications: Medication refills not needed today.  Pharmacy name entered into [employer]:    [facility] - [place], [place] - [address] [facility] [place], [place] - [address] [place] [address] [phone]  [facility]  [facility] - [place], [place] - [address] [place] [address]    Frailty Screening:   Is the patient here for a new oncology consult visit in cancer care? 2. No    Kelvin Gomez LPN              "

## 2024-05-09 NOTE — LETTER
5/9/2024         RE: Nik Nava  [id number] [facility] [id number]        Dear Colleague,    Thank you for referring your patient, Nik Nava, to the [facility] BLOOD AND MARROW TRANSPLANT PROGRAM [place]. Please see a copy of my visit note below.      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 Recent oral HSV and oral cGVHD flare treated with prednisone.  Feb 2024 Bronchitis.  Discharged last week from local admission for RSV PNA.       HPI: Nik returns to clinic today for the most part doing well.  He has recovered from the viral infections that he had back in February and March, and he said no further infectious events.  His GVHD symptoms for the most part are stable although he does note some soreness around his lips and he has been using the tacrolimus cream.  His eyes remain dry but stable, he is eating and drinking well, he has no change in bowel habits, and no active skin issues.  He is tolerating his medications well.  His primary complaint is fatigue.  He is tired on most days and has difficulty going out for any significant walking or activity.  He has noted significant weight gain and for the most part he sleeps well but not consistently.  He does have a history of sleep apnea but does not wish to use his CPAP device, he has visited oncology rehab in the past but tired of the exercises and other activities.  In the past he has been noted to have mildly low testosterone levels but has been recommended against testosterone replacement    Review of Systems                                                                                                                                         10 point Review of systems was negative except as detailed above     PHYSICAL EXAM                                                                                                                                                    KPS: 70    Wt Readings from Last 4 Encounters:   05/09/24 117 kg (258 lb)   04/02/24 116 kg (255 lb 12.8 oz)   03/13/24 115 kg (253 lb 8 oz)   02/27/24 113.4 kg (249 lb 14.4 oz)      General: NAD.  HEENT: sclera anicteric, injected conjunctivae.    11/14/2023 No noted lichenoid changes in the oral mucosa previous prominent area of healed ulcers in the right/left mid buccal mucosa are less prominent with overall improved appearence and more normal appearing mucosal tissue, no ulcers seen.    1/9/2024 mild lichenoid changes and healing ulcers on both buccal mucosa no open discolored lesions noted  2/27/2024  no lichenoid changes, healing ulcers/minimal scaring on both buccal mucosa  4/2/2024  no lichenoid changes, healing ulcers/minimal scaring on both buccal mucosa  Lungs:  CTA b/l  no wheezes  CV: RRR  no gallop  Abd; soft, NABS, protuberant  Ext/ Skin: Skin bruising on bilateral forearms,  fainting of previous hyperpigmented areas of previously known cGVHD  LE trace bilateral edema in lower extremities- wearing compression socks    cGVHD therapy started on February 24 2022  - Baseline NIH scoring 2/24/2022 : skin 2 maculopapular rash/erythema with no sclerotic features, mouth score 1 mild symptoms with lichenoid changes less than 25%, eyes score 2 moderate symptoms without new visual impairments due to KCS requiring lubricant eyedrops more than 3 times a day, overall mild scale for her provider  - 3/25/2022 1 month NIH treatment assessment on PQRST: skin 2, mouth score 1 mild symptoms with NO lichenoid changes, eyes score 2 moderate symptoms w requiring lubricant eyedrops more than 3 times a day, liver score 1 ALT 3.7x ULN and nl bilirubin   - 3/31  Mouth clear; eyes minimal LFT still abn; no joint or new GI sx  - 4/28 Mouth clear, Left>R eye is mild sclera erythema, lids are pink and irritated appearing, LFT's slow improvement, no new joint, skin, or GI symptoms.    -5/11 mouth with mild erythema but no lichenoid changes, eyes using eyedrops 4-6 times a day score of 2, LFTs significantly improved from baseline slight elevation in alk phos and AST with normal bilirubin, skin with hyperpigmentation from old acute GVHD no active skin rashes, no GI symptoms no musculoskeletal complaints.  -5/26 Mouth with very slight lichenoid changes, erythema.  Eyes still using eyedrops 4-6x per day.  LFTs essentially normal with slight elevation in alk phos.  Skin with hyperpigmentation from old acute GVHD, no active rashes, no GI or MSK concerns.  Skin 0, mouth 0 but with lichenoid changes, eyes 2  -6/7/22 Mouth with no lichenoid changes, erythema.  Eyes still using eyedrops 4-6x per day.  LFTs essentially normal with slight elevation in alk phos.  Skin with hyperpigmentation from old acute GVHD, no active rashes, no GI or MSK concerns.  Skin 0, mouth 0, eyes 2     -6 month PQRST assessment 9/6/2022: eyes 3, mouth 0 for symptoms, 25% lichenoid changes (1), liver/GI/skin 0    11/8/2022, 11/22/2022, 12/13/22 cGVHD NIG scoring eyes 3, no other organ involvement clinically  3/28/23 cGVHD NIG scoring eyes 3, no other organ involvement clinically  5/2/23 cGVHD NIG scoring eyes 3, oral 1, no other organ involvement clinically  6/13/23 cGVHD NIG scoring eyes 3, oral 1, no other organ involvement clinically  8/15/23 cGVHD NIG scoring eyes 3 (down to 3-4 times eye drop from >10 but still using scleral lenses), oral 1, no other organ involvement clinically  10/10/2023 cGVHD NIG scoring eyes 3 (down to 3-4 times eye drop from >10 but still using scleral lenses), oral 1, no other organ involvement clinically  11/14/2023 cGVHD NIG scoring eyes 3 (down to 3-4 times eye drop from >10 but still using scleral lenses), oral 1, no other organ involvement clinically  1/9/2024 cGVHD NIG scoring eyes 3 (down to 3-4 times eye drop from >10 but still using scleral lenses), oral 2, no other organ involvement  clinically  2/27/2024 cGVHD NIG scoring eyes 3 (down to 3-4 times eye drop from >10 but still using scleral lenses), oral 1, no other organ involvement clinically  4/22024 cGVHD NIG scoring eyes 3 (down to 2 times eye drop from >10 but still using scleral lenses), oral 1, no other organ involvement clinically    Lab:    Lab Results   Component Value Date    WBC 9.5 05/09/2024    ANEU 3.5 10/26/2021    HGB 11.7 (L) 05/09/2024    HCT 34.4 (L) 05/09/2024     05/09/2024     04/02/2024    POTASSIUM 4.5 04/02/2024    CHLORIDE 101 04/02/2024    CO2 23 04/02/2024     (H) 04/02/2024    BUN 34.0 (H) 04/02/2024    CR 1.13 04/02/2024    MAG 2.0 11/14/2023    INR 0.90 12/01/2022    BILITOTAL 0.8 04/02/2024    AST 37 04/02/2024    ALT 32 04/02/2024    ALKPHOS 94 04/02/2024    PROTTOTAL 6.7 04/02/2024    ALBUMIN 3.9 04/02/2024 4/28/2023    MRI Lumbar spine  1.  The previously seen ventral epidural abscess has resolved with small amount of residual ventral epidural phlegmon.   2.  Marrow edema about the L5-S1 endplates has mildly worsened within new rim-enhancing subchondral lesion in the left S1 superior endplate. This could reflect progression of osteomyelitis.      MRI hip right  1.  No evidence of septic arthritis of the right hip joint.   2.  There is degeneration and likely tearing of the right acetabular labrum anterosuperiorly, and probably low-grade chondromalacia of the right hip joint.   3.  Abnormality at L5-S1 compatible with known discitis-osteomyelitis.    ASSESSMENT AND PLAN   Nik Nava is a 64 year old male with Ph Pos ALL, day 1003 s/p sib allo stem cell transplant    Day -6 (8/4): flu/cy  Day -5 through day -2 (8/5-8/8): flu  Day -1 (8/9): TBI  Day 0 (8/10): transplant     1.  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 S/p allo sib PBSCT. (ABO matched)  HCT-CI score: 3 (prior solid tumor)  Day +100 bone marrow biopsy is 100% donor with no  morphologic or flow cytometric evidence of leukemia BCR abl is pending.  - Day +180 restaging BM bx- still in CR  100% donor;  2/16/22 BCR ABL major breakpoint undetectable.   - 1 year restaging 8/18/2022: Markedly hypocellular bone marrow [less than 10% cellular] with maturing trilineage hematopoiesis and no definite morphologic or immunophenotypic evidence for involvement by B lymphoblastic leukemia. BCRABL1 PCR not detected, bone marrow % donor. FISH NORMAL No evidence of BCR-ABL1 fusion  - Maintenance with TKI posttransplantation given his Ph positive ALL that have not been started previously presumed secondary to multiple active issues specifically infections and COVID.  Per Avila Tobar: will reconsider this patient will think about whether this is something he would like to consider he was previously on Dasatinib, we would start on a low dose and increase as tolerated.  This is on hold now pending active GVHD therapy, we discussed on this visit 10/18 in agreement with holding off.  - 2 year restaging 8/7/2023 BMB: - No morphologic or immunophenotypic evidence of recurrent/persistent B-lymphoblastic leukemia. Slightly hypocellular marrow (10-20% estimated cellularity, patchy and variable), with trilineage hematopoetiic maturation and less than 2% blasts. Peripheral blood showing slight normocytic normochromic anemia and slight thrombocytopenia. BM % donor. FISH No evidence of BCR::ABL1 fusion /CG No recipient (male) cells  or cells with a t(9;22) or other clonal chromosomal abnormality were  detected among the 20 metaphases analyzed.      2.  HEME: CBC stable.   3/2023 iron low 28, iron saturation index 10%, transferrin 225, ferritin 1381 down trending (in retrospect that was the time of epidural abscess as well).  B12, folate normal.  High copper and zinc borderline low, 5/2023 started zinc.  Started iron replacement in 4/2023, recheck iron profile on follow up more consistent with AOCD, Iron  11/14/23 WNL. Note ferritin elevation is in the setting of discitis/OM, 4/2 down to 1500 but recovering from RSV with nl iron profile, low threshold to obtian ferriscan to objectively assess for iron overload.     3.  FEN/Renal: Creatinine 0.97, s/p nephrectormy, nephrology following     4.    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   4/2/2024 cGVHD NIG scoring eyes 3, oral 1, no other organ involvement clinically     #Skin GVHD- history of biopsy proven GVHD of the skin 8/30/2021; resolved.   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 WNL today   # Ocular GVHD: follows with ophthalmology. Maxitrol to lids, continue refreshing drops QID. Score 3, but down to 2 times eye drops from >10/day, using scleral lenses  Followed closely by Dr. Juarez with significant improvement with scleral lenses and eyedrops  # Oral GVHD: dex s/s 2x.  Lichenoid changes have largely resolved with no open ulcers (December 2023 for flare of oral GVHD in the setting of oral herpes reactivation)    Previous therapies  Tacrolimus-previously decided to stay on same dose, no checks of levels if clinically stable, and no need switch to sirolimus. (keep tac low as gvhd is quiet and viremia). 5/10 level 45 with starting of COVID therapy and D-D intractions (Paxlovid for COVID19, which has a drug interaction with tacrolimus-Ritonavir increases serum levels of tacrolimus).   Tacrolimus level subtherapeutic, increase to 2.5 mg twice daily recently, 8/23/2022 instructed to change tacrolimus to 2 mg twice daily. 9/6 with mild NEGRA, hyperkalemia, hypomagnesemia, and reports some tremors and cramps, suspect tacrolimus to be high therefore empirically decreased to 1.5 twice daily, skip morning dose of tacrolimus and took 2 mg today after his afternoon labs. Decrease to 1mg BID on Saturday.  Sirolimus  9/21 despite fluids and a dose adjustment of tacrolimus patient seem to have  persistence NORBERTO related NEGRA, therefore after discussion with the patient decision was made to transition to sirolimus that was started on 9/21, patient initially seem to tolerate this well with normalization of kidney function  10/11 presented with flares of ocular and oral GVHD, fluid retention and gain of 5-6 kg, lower extremity edema, abdominal distention, total protein to creatinine ratio elevated at 0.4, in addition to elevation in BUN and creatinine, HTN.  Picture most consistent with sirolimus related side effects.  Less likely that the patient will tolerate even a lower level of sirolimus.  Therefore the patient was instructed to stop sirolimus, last dose on 10/10. 10/14 level 3.5 appropriately trending down.     Corticosteroids  3/1-3/16: prednisone 100mg every day  3/17 75mg every day   3/31 75 alt with 50  4/6: 75 alt with 25mg every other day  4/12 pred tapered to 60 alternating with 25mg   4/15 In setting of viruses trying to reactivate,GVHD stable: Taper 60/15mg alternating.  4/20 decrease pred further to 50/10.    4/25 taper pred to 50/0 every other day as long as symptoms stable.   5/2 Pred decrease 40/0 alternating every other day.   5/13 decrease to 30/0  5/20 decrease to 20/0 then slowly by 5 mg weekly  6/7 decrease to 10/0  Went up to 15/0 with mild increased LFTs  8/23/2022 increased to 20/0, with persistence of oral ulcers and active ocular GVHD.  Discussed with the patient the importance of stopping chewing of nicotine gums for prolonged hours as that would not help with the healing of the oral ulcers.  I discussed with the patient alternatives of nicotine patches that he will reconsider and let me know.  9/6 decreased to 15 alternating with 0  9/13 decreased to 10 alternating with 0  9/21 discontinued tacrolimus given persistent NEGRA and single kidney and start the patient on sirolimus without loading dose.  We will get a list of possible side effects and adverse events from the Pharm.D. see  sirolimus section above.  10/11 GVHD flare. Sirolimus stopped. Resume prednisone 40 mg daily as of 10/12, no change with mildly worsening eye pain 10/14  Reduce pred to 35mg 10/25 and 25mg 11/1 11/8 20 mg   11/22 15 mg  12/13/22 10 mg (plan to taper to 5mg then slow taper 1 mg per month given long pred history)  2/23/23 lower from 5 mg to 4 mg for the next month  3/28/2023 - 5/2/2023- 8/15/2023 continue on 10/4 given adrenal insufficieny with recent am cortisol check and clinical symptoms on taper to lower dose of 4 mg daily  -12/2023 had oral GVHD flare started on Prednisone 40 mg,  start taper to 40 and 30 mg alternating for 1 week then 30 mg daily for 1 week, then 30 and 20 alternating for 1 week, then 20 for 1 week till he sees me.  - 2/27/2024 decrease from 10/20mg prednisone to 10/15 then 10/10 -flared and now on 15mg with   - 4/2/2024 continue 15mg    Belumosudil  Patient intolerant/with disease flares on tacrolimus and sirolimus see above.  Discussed with the patient the different options for therapy of chronic GVHD that are FDA approved.  Given the side effect profiles after discussing in detail and given the least nephrotoxicity and expected response, taking into account other metabolic effect as well.  We will proceed with prior authorization with belumosudil.  Patient was given material to review for possible expected adverse events and side effects with both Belumosodil and ruxolitinib.   --- Started on 10/18/2022 - skin/liver/oral GVHD inactive, and occular symptoms controlled/symptomatic improvement.   --- 10/10/2023 will hold belumosodil given recurrent infections ans significant improvement in symptoms with no end organ damage after discussions with him and his wife. Will optimize topical treatment with scleral lenses, eyedrops ans dex s/sp int he interim to avoid flares. Will readdress need to restart systemic treatment pending infectious resolution and symptoms.      5.  ID:   # Recent history of  Epidural abscess: Followed by Dr. Candelario ID, plan to be on IV ceftriaxone for at least 6 weeks course through 5/11/2023-to be determined on further follow-up.  See imaging with MRI follow-up as above.     #Recurrent discitis/OM still on treatment through October 2023, cx negative, managed with ID locally.  3/30/2023 blood culture with Staph epidermidis  3/31/2023 BONE, L5, FLUOROSCOPICALLY-GUIDED NEEDLE BIOPSY: Benign bone with trilineage hematopoiesis (normal) Negative for neoplasm Negative for acute osteomyelitis. DISC, L5-S1, ASPIRATION FOR CYTOLOGY: Acellular debris; no cellular material present for evaluation     #History of COVID x2 last 1/2023 and influenza    Treated with Paxlovid with persistent fatigue but no active respiratory symptoms  received his influenza annual vaccine as well as COVID boosters locally 11/16/2022     #History of pulmonary infiltrates:   4/20 CT chest with new RUL infiltrate. Highly immunocompromised. 4/22 Fungitell/asp GM were neg. BAL 4/29 NGTD, increased micafungin to 150mg IV daily-- f/u 6/1 Dr Garcia CT with mixed results, followed with Dr. Garcia August 2022 with resolution of pulmonary nodules and normalization of LFTs switched patient to posaconazole prophylactic dose and monitor LFTs and any infectious concerns closely. Monitor and low threshold check CT or add back if on higher dose steroids.     #History of CMV viremia:    - CMV viremia up to 1100. 4/15 started valcyte 900mg PO BID. 4/20 CMV <137, 5/10 ND, decreased to 450mg BID 4/30 - continue 4 weeks as long as CMV <137 till 5/28 + weekly CMV. Switched to acyclovir after completion of therapy 5/28/2022. recheck 3/1/23 ND    # History of Oral herpes  12/2023:HSV oral lesions; PCR positive. Stopped ACV last month and lesions appears soon thereafter. Empirically Rx Valtrex. mild lingering URI sx; RVP + rhino (12/21); supportive cares. (CCT 12/1/23 neg).started on pred 40mg/d.  Completed Valtrex course and put back on acyclovir  restarted 1/20/2024    # History of PNA/bronchitis 2/9/2024 sp doxycyline    # History of RSV PNA 3/26/2024, s/p ribavirin    #Hypogammaglobulinemia with recurrent infections: will check prior auth for IVIG and confirm OK with nephrology       - PPx:   ---ACV  Patient developed oral herpes reactivation after stopping acyclovir therefore resumed on 1/9/2023  ---Posaconazole (previously on micafungin with LFts abl, hx of pulmonary nodules needign treatment dose). 11/14/2023 discontinue and check CT in 2 months given history completed December 2023 with no concerns for infections or nodular lesions. Monitor and low threshold check CT or add back if on higher dose steroids.  ---Pentamidine, future 4/22/2024  ---On Penicillin 250 bid px     - EBV viremia: 4/20 CAP CT (w/ contrast): No adenopathy. S/p 4/22 and 5/4/22 rituximab 375mg/m2 5/10 ND x2, 3/13/2024     -  - vaccinations: Previously deferred annual vaccines in the setting of GVHD and immunosuppression therapy. 11/14/2023 we will start vaccination series, including Shingrix, COVID-vaccine. 1/9/2023 Shingrix vaccine.  Needs boosters on next follow up given today RSV recovery    6. Endo:   - Hx of graves disease; On synthroid follows with endocrine  - HLD: 11/2022 cholesterol 355, triglycerides 153, -- 11/8 started rosuvastatin 5 mg daily, 11/22 CPK within normal, 5/2/2023 repeat lipid profile cholesterol down to 202, triglycerides 191, LDL now normal at 95.  We will continue same dose of rosuvastatin and add fish oil 1g BID (on hold). Repeated August with PCP, who added back fish oil    7. CV:   - Hypertension history   - TTE most recently 10/2022, ECHO in January 2024 at [facility]     8. GI:   -Omeprazole for heartburn  -LFTs as above, now normal. Off ursodiol     9. Psych:   - Situational anxiety - lexapro 10mg daily.   - Insomnia: worse on steroids. Ativan, trazadone, melatonin     10. MSK:   -History of left food drop, PT. Occasional muscle cramps  discussed optimizing vitamin D levels and considering vitamin D, some of the symptomatology of muscle cramps are likely related to chronic GVHD.  No muscle cramps reported today.  -4/2023 new tearing of the right acetabular labrum anterosuperiorly referred to to orthopedic surgery.       11. HCM/Age appropriate cancer screening:  -Discussed with the patient importance of age-appropriate cancer screening including colonoscopies, he is due for this.  -Discussed importance of at least once a year vitamin D level check, 8/18/2022 wnl  -Screening for secondary iron overload, see heme section above  -Yearly TSH 2022 wnl; yearly lipid panel - see GI section above  -9/6/2022 DEXA scan Based on BMD diagnosis is consistent with normal bone density based on WHO criteria Ref. 1   -PFTs 9/20/22, see above. 9/21/2023 PFTs The FVC, FEV1, FEV1/FVC ratio and NIA32-05% are within normal limits. FVC 99% (108), FEV1 91%, DLCO uncorrected 91%.  Repeat PFTs in 4 to 6 months.  -Metabolic other, 8/2022 testosterone within normal  -11/22/2022 discussed with the patient the challenges and the stresses of chronic illness and healthcare fatigue.  Patient had previously been on Lexapro that we restarted confirmed with pharmacy that there are no drug drug interactions.  Additionally we will referred patient to PMNR to help with physical rehab and strength to help with fatigue.  Our social workers will also reach out to Nik and his wife for further resources including support groups for patients with chronic illness and fatigue post transplant.  -Referral to palliative care for symptom and pain management including insomnia     Summary: I recommended that he reconsider a sleep evaluation because of the potential effect of sleep apnea on sleep quality and the resulting effect on daytime sleepiness and fatigue.  He is open to the idea although somewhat resistant to using the device.  I pointed out that there may be new devices or other ways to  make this more tolerable.  He had difficulty with prednisone doses lower than 15 mg and we agreed to continue the current dosing.    Plan:  - continue prednisone 15  - sleep clinic eval ordered   - check CD4 count, if over 200 cancel pentamidine  - continue dex swish and spit daily PRN  - RTC 6-8 weeks  - IVIG if IgG lower than 400  - continue f/unit(s) for endo, neph, PCP    The longitudinal plan of care for the diagnosis(es)/condition(s) as documented were addressed during this visit. Due to the added complexity in care, I will continue to support Nik in the subsequent management and with ongoing continuity of care.      45 minutes spent on the date of the encounter doing chart review, history and exam, lab review, documentation and coordniating care.    DOMINIQUE BRITTON MD  May 10, 2024

## 2024-05-09 NOTE — NURSING NOTE
Pt received 14 month vaccines at today's appt. This RN administered one vaccine in Left and Right Deltoid (see MAR). Pt tolerated injections well. VIS and vaccine schedule provided. Pt with no further questions at this time. Pt feeling well today.     Raquel Landa RN

## 2024-05-09 NOTE — TELEPHONE ENCOUNTER
Called and discussed with spouse that there is no C2C on file so patient can call back, but I will send patient a [facility] and to confirm if that will work. Can have patient fill out consent to communicate with spouse at [facility] for future.

## 2024-05-10 LAB
CD3 CELLS # BLD: 2077 CELLS/UL (ref 603–2990)
CD3 CELLS NFR BLD: 69 % (ref 49–84)
CD3+CD4+ CELLS # BLD: 590 CELLS/UL (ref 441–2156)
CD3+CD4+ CELLS NFR BLD: 20 % (ref 28–63)
CD3+CD4+ CELLS/CD3+CD8+ CLL BLD: 0.39 % (ref 1.4–2.6)
CD3+CD8+ CELLS # BLD: 1507 CELLS/UL (ref 125–1312)
CD3+CD8+ CELLS NFR BLD: 50 % (ref 10–40)
IGG SERPL-MCNC: 586 MG/DL (ref 610–1616)
T CELL COMMENT: ABNORMAL

## 2024-05-14 DIAGNOSIS — E78.2 MIXED HYPERLIPIDEMIA: ICD-10-CM

## 2024-05-14 RX ORDER — ROSUVASTATIN CALCIUM 5 MG/1
5 TABLET, COATED ORAL DAILY
Qty: 30 TABLET | Refills: 3 | Status: SHIPPED | OUTPATIENT
Start: 2024-05-14 | End: 2024-09-25

## 2024-05-17 ENCOUNTER — OFFICE VISIT (OUTPATIENT)
Dept: DERMATOLOGY | Facility: CLINIC | Age: 64
End: 2024-05-17
Payer: MEDICARE

## 2024-05-17 DIAGNOSIS — L71.9 ROSACEA: Primary | ICD-10-CM

## 2024-05-17 DIAGNOSIS — L81.4 LENTIGINES: ICD-10-CM

## 2024-05-17 DIAGNOSIS — Z12.83 ENCOUNTER FOR SCREENING FOR MALIGNANT NEOPLASM OF SKIN: ICD-10-CM

## 2024-05-17 DIAGNOSIS — D22.9 MULTIPLE NEVI: ICD-10-CM

## 2024-05-17 DIAGNOSIS — D18.01 CHERRY ANGIOMA: ICD-10-CM

## 2024-05-17 DIAGNOSIS — L82.1 SEBORRHEIC KERATOSES: ICD-10-CM

## 2024-05-17 DIAGNOSIS — L72.0 MILIA: ICD-10-CM

## 2024-05-17 PROCEDURE — 99214 OFFICE O/P EST MOD 30 MIN: CPT | Performed by: PHYSICIAN ASSISTANT

## 2024-05-17 ASSESSMENT — PAIN SCALES - GENERAL: PAINLEVEL: NO PAIN (0)

## 2024-05-17 NOTE — NURSING NOTE
Dermatology Rooming Note    Nik Nava's goals for this visit include:   Chief Complaint   Patient presents with    Skin Check     FBSE.  Spots of concern on the arm and nose     Alba Gaitan CMA

## 2024-05-17 NOTE — PATIENT INSTRUCTIONS
Patient Education        Proper skin care from [facility]:     -Eliminate harsh soaps as they strip the natural oils from the skin, often resulting in dry itchy skin ( i.e. Dial, Zest, [place] Spring)  -Use mild soaps such as Cetaphil or Dove Sensitive Skin in the shower. You do not need to use soap on arms, legs, and trunk every time you shower unless visibly soiled.   -Avoid hot or cold showers.  -After showering, lightly dry off and apply moisturizing within 2-3 minutes. This will help trap moisture in the skin.   -Aggressive use of a moisturizer at least 1-2 times a day to the entire body (including -Vanicream, Cetaphil, Aquaphor or Cerave) and moisturize hands after every washing.  -We recommend using moisturizers that come in a tub that needs to be scooped out, not a pump. This has more of an oil base. It will hold moisture in your skin much better than a water base moisturizer. The above recommended are non-pore clogging.        Wear a sunscreen with at least SPF 30 on your face, ears, neck and V of the chest daily. Wear sunscreen on other areas of the body if those areas are exposed to the sun throughout the day. Sunscreens can contain physical and/or chemical blockers. Physical blockers are less likely to clog pores, these include zinc oxide and titanium dioxide. Reapply every two hour and after swimming.      Sunscreen examples: https://www.ewg.org/sunscreen/     UV radiation  UVA radiation remains constant throughout the day and throughout the year. It is a longer wavelength than UVB and therefore penetrates deeper into the skin leading to immediate and delayed tanning, photoaging, and skin cancer. 70-80% of UVA and UVB radiation occurs between the hours of 10am-2pm.  UVB radiation  UVB radiation causes the most harmful effects and is more significant during the summer months. However, snow and ice can reflect UVB radiation leading to skin damage during the winter months as well. UVB radiation is  responsible for tanning, burning, inflammation, delayed erythema (pinkness), pigmentation (brown spots), and skin cancer.      I recommend self monthly full body exams and yearly full body exams with a dermatology provider. If you develop a new or changing lesion please follow up for examination. Most skin cancers are pink and scaly or pink and pearly. However, we do see blue/brown/black skin cancers.  Consider the ABCDEs of melanoma when giving yourself your monthly full body exam ( don't forget the groin, buttocks, feet, toes, etc). A-asymmetry, B-borders, C-color, D-diameter, E-elevation or evolving. If you see any of these changes please follow up in clinic. If you cannot see your back I recommend purchasing a hand held mirror to use with a larger wall mirror.       Checking for Skin Cancer  You can find cancer early by checking your skin each month. There are 3 kinds of skin cancer. They are melanoma, basal cell carcinoma, and squamous cell carcinoma. Doing monthly skin checks is the best way to find new marks or skin changes. Follow the instructions below for checking your skin.   The ABCDEs of checking moles for melanoma   Check your moles or growths for signs of melanoma using ABCDE:   Asymmetry: the sides of the mole or growth don t match  Border: the edges are ragged, notched, or blurred  Color: the color within the mole or growth varies  Diameter: the mole or growth is larger than 6 mm (size of a pencil eraser)  Evolving: the size, shape, or color of the mole or growth is changing (evolving is not shown in the images below)    Checking for other types of skin cancer  Basal cell carcinoma or squamous cell carcinoma have symptoms such as:      A spot or mole that looks different from all other marks on your skin  Changes in how an area feels, such as itching, tenderness, or pain  Changes in the skin's surface, such as oozing, bleeding, or scaliness  A sore that does not heal  New swelling or redness beyond  the border of a mole     Who s at risk?  Anyone can get skin cancer. But you are at greater risk if you have:   Fair skin, light-colored hair, or light-colored eyes  Many moles or abnormal moles on your skin  A history of sunburns from sunlight or tanning beds  A family history of skin cancer  A history of exposure to radiation or chemicals  A weakened immune system  If you have had skin cancer in the past, you are at risk for recurring skin cancer.   How to check your skin  Do your monthly skin checkups in front of a full-length mirror. Check all parts of your body, including your:   Head (ears, face, neck, and scalp)  Torso (front, back, and sides)  Arms (tops, undersides, upper, and lower armpits)  Hands (palms, backs, and fingers, including under the nails)  Buttocks and genitals  Legs (front, back, and sides)  Feet (tops, soles, toes, including under the nails, and between toes)  If you have a lot of moles, take digital photos of them each month. Make sure to take photos both up close and from a distance. These can help you see if any moles change over time.   Most skin changes are not cancer. But if you see any changes in your skin, call your doctor right away. Only he or she can diagnose a problem. If you have skin cancer, seeing your doctor can be the first step toward getting the treatment that could save your life.   [employer] last reviewed this educational content on 4/1/2019 2000-2020 [employer]. [address] [place] [address], [place] [address]. All rights reserved. This information is not intended as a substitute for professional medical care. Always follow your healthcare professional's instructions.        When should I call my doctor?  If you are worsening or not improving, please, contact us or seek urgent care as noted below.      Who should I call with questions (adults)?  [facility] (adult and pediatric): [phone]  [facility]  [place] (adult): [phone]  [facility] ([place], [place], [place] and [place]) [phone]  For urgent needs outside of business hours call the [facility] at [phone] and ask for the dermatology resident on call to be paged  If this is a medical emergency and you are unable to reach an ER, Call 911        If you need a prescription refill, please contact your pharmacy. Refills are approved or denied by our Physicians during normal business hours, Monday through Fridays  Per office policy, refills will not be granted if you have not been seen within the past year (or sooner depending on your child's condition)

## 2024-05-17 NOTE — LETTER
5/17/2024       RE: Nik Nava  [address]  [place] [address]     Dear Colleague,    Thank you for referring your patient, Nik Nava, to the [facility] [place] [facility]. Please see a copy of my visit note below.    [facility] Dermatology Note  Encounter Date: May 17, 2024  Office Visit     Reviewed patients past medical history and pertinent chart review prior to patients visit today.     Dermatology Problem List:  1. 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 Rosacea  - metronidazole 0.75% cream    No personal history of skin cancer.  No family history of skin cancer.  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     # Rosacea  - Discussed the chronic nature of the condition.   - Start metronidazole 0.75% cream twice daily.     # Multiple nevi, trunk and extremities  # Solar lentigines  - No concerning features on dermoscopy. We discussed the importance of self exams at home.   - ABCDEs: Counseled ABCDEs of melanoma: Asymmetry, Border (irregularity), Color (not uniform, changes in color), Diameter (greater than 6 mm which is about the size of a pencil eraser), and Evolving (any changes in preexisting moles).  - Sun protection: Counseled SPF 30+ sunscreen, UPF clothing, sun avoidance, tanning bed avoidance.    # Cherry angiomas  # Seborrheic keratoses  - We discussed the benign nature of the skin lesions. No treatment required. Continued observation recommended. Follow up with any concerns.      # Milia, bilateral elbows  - We reviewed the etiology of this skin finding. We discussed the option to undergo extraction, which the patient requested today.     Extraction procedure note: After obtaining patient consent, the sites were cleansed with an alcohol  swab. The milia were gently pricked with an 11-blade and the contents extracted with pressure. The patient tolerated this well. Care reviewed.     Follow-up:  Annual for follow up full body skin exam, as needed for new or changing lesions or new concerns    All risks, benefits and alternatives were discussed with patient.  Patient is in agreement and understands the assessment and plan.  All questions were answered.  Zoe Wesley PA-C  [facility]    ____________________________________________    CC: Skin Check (FBSE.  Spots of concern on the arm and nose)    HPI:  Mr. Nik Nava is a(n) 64 year old male who presents today as a return patient for a full body skin cancer screening. The patient reports recent redness and acne lesions on the nose. He also reports new white bumps on his elbows. No pain, itching, or bleeding at the sites. No other specific cutaneous concerns today. The patient reports trying to be diligent with photoprotection.      Physical Exam:  Vitals: There were no vitals taken for this visit.  SKIN: Total skin excluding the genitalia areas was performed. The exam included the scalp, face, neck, bilateral arms, chest, back, abdomen, bilateral legs, digits, mons pubis, buttocks, and nails.   - Barrios II.  - The medial cheeks and nose demonstrate inflammatory papules with background telangiectasias, consistent with rosacea.   - The left elbow x3 and right elbow x1 demonstrate white papules, consistent with milia.   - Multiple tan/brown macules and papules scattered throughout exam, consistent with benign nevi. No concerning features on dermoscopy.   - Scattered tan, homogenous macules scattered on sun exposed skin, consistent with solar lentigines.   - Scattered waxy, stuck on appearing papules and patches, consistent with seborrheic keratoses.    - Several 1-2 mm red dome shaped symmetric papules, consistent with cherry angiomas.     Medications:  Current Outpatient  Medications   Medication Sig Dispense Refill    acyclovir (ZOVIRAX) 400 MG tablet Take 2 tablets (800 mg) by mouth every 12 hours 360 tablet 3    amLODIPine (NORVASC) 5 MG tablet Take 1 tablet (5 mg) by mouth daily 90 tablet 3    chlorthalidone (HYGROTON) 25 MG tablet Take 0.5 tablets (12.5 mg) by mouth daily 30 tablet 0    dexAMETHasone alcohol-free (DECADRON) 0.1 MG/ML solution Swish and spit 10 mLs (1 mg) in mouth 4 times daily Hold while on Valtrex 500 mL 3    escitalopram (LEXAPRO) 10 MG tablet Take 1 tablet (10 mg) by mouth daily 30 tablet 3    levothyroxine (SYNTHROID/LEVOTHROID) 112 MCG tablet Take 1 tablet (112 mcg) by mouth daily 90 tablet 4    nicotine polacrilex (NICORETTE) 4 MG gum Place 4 mg inside cheek daily      Nutritional Supplements (ENSURE COMPLETE SHAKE) LIQD Take 11 mLs by mouth every morning      omeprazole (PRILOSEC) 40 MG DR capsule Take 1 capsule (40 mg) by mouth daily 30 capsule 3    penicillin V (VEETID) 250 MG tablet Take 1 tablet (250 mg) by mouth 2 times daily 60 tablet 3    predniSONE (DELTASONE) 5 MG tablet Take 1 tablet (5 mg) by mouth daily Currently taking 15 mg daily ( on taper) 30 tablet 1    rosuvastatin (CRESTOR) 5 MG tablet Take 1 tablet (5 mg) by mouth daily 30 tablet 3    sennosides (SENOKOT) 8.6 MG tablet Take 1 tablet by mouth 3 times daily as needed for constipation 90 tablet 0    acetaminophen (TYLENOL) 500 MG tablet Take 500 mg by mouth as needed (Patient not taking: Reported on 5/9/2024)      Carboxymethylcell-Glycerin PF (REFRESH RELIEVA PF) 0.5-1 % SOLN Apply 1 drop to eye 4 times daily Preservative free. Either PF multidose bottle or PF vials (Patient not taking: Reported on 5/9/2024) 30 each 11    sodium chloride 0.9 % neb solution 3 mLs by Other route 2 times daily (Patient not taking: Reported on 5/9/2024) 300 mL 11    tacrolimus (PROTOPIC) 0.1 % external ointment Apply topically 2 times daily (Patient not taking: Reported on 4/12/2024) 30 g 1     No current  facility-administered medications for this visit.      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)    Shortness of breath    Influenza A    Postablative hypothyroidism    On corticosteroid therapy    History of Graves' disease    Fatigue, unspecified type    Combined forms of age-related cataract of both eyes    Dry eye    Hypogammaglobulinemia (H24)     Past Medical History:   Diagnosis Date    ALL (acute lymphocytic leukemia) (H)     CKD (chronic kidney disease)     GVHD (graft versus host disease) (H)     H/O peripheral stem cell transplant (H)     Hyperthyroidism 1996    Infection due to 2019 novel coronavirus 01/16/2023    Influenza A 11/2022    Postablative hypothyroidism 1997    Pulmonary embolism (H)     Renal cell carcinoma (H) 2007    right kidney    Sleep apnea      CC Chris Cote MD  [address] [facility] [address]  [place] [address] on close of this encounter.

## 2024-05-17 NOTE — PROGRESS NOTES
[facility] Dermatology Note  Encounter Date: May 17, 2024  Office Visit     Reviewed patients past medical history and pertinent chart review prior to patients visit today.     Dermatology Problem List:  1. 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 Rosacea  - metronidazole 0.75% cream    No personal history of skin cancer.  No family history of skin cancer.  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     # Rosacea  - Discussed the chronic nature of the condition.   - Start metronidazole 0.75% cream twice daily.     # Multiple nevi, trunk and extremities  # Solar lentigines  - No concerning features on dermoscopy. We discussed the importance of self exams at home.   - ABCDEs: Counseled ABCDEs of melanoma: Asymmetry, Border (irregularity), Color (not uniform, changes in color), Diameter (greater than 6 mm which is about the size of a pencil eraser), and Evolving (any changes in preexisting moles).  - Sun protection: Counseled SPF 30+ sunscreen, UPF clothing, sun avoidance, tanning bed avoidance.    # Cherry angiomas  # Seborrheic keratoses  - We discussed the benign nature of the skin lesions. No treatment required. Continued observation recommended. Follow up with any concerns.      # Milia, bilateral elbows  - We reviewed the etiology of this skin finding. We discussed the option to undergo extraction, which the patient requested today.     Extraction procedure note: After obtaining patient consent, the sites were cleansed with an alcohol swab. The milia were gently pricked with an 11-blade and the contents extracted with pressure. The patient tolerated this well. Care reviewed.     Follow-up:  Annual for follow up full body skin exam, as needed for new or changing lesions or new concerns    All risks, benefits and alternatives were discussed with  patient.  Patient is in agreement and understands the assessment and plan.  All questions were answered.  Zoe Wesley PA-C  [facility]    ____________________________________________    CC: Skin Check (FBSE.  Spots of concern on the arm and nose)    HPI:  Mr. Nik Nava is a(n) 64 year old male who presents today as a return patient for a full body skin cancer screening. The patient reports recent redness and acne lesions on the nose. He also reports new white bumps on his elbows. No pain, itching, or bleeding at the sites. No other specific cutaneous concerns today. The patient reports trying to be diligent with photoprotection.      Physical Exam:  Vitals: There were no vitals taken for this visit.  SKIN: Total skin excluding the genitalia areas was performed. The exam included the scalp, face, neck, bilateral arms, chest, back, abdomen, bilateral legs, digits, mons pubis, buttocks, and nails.   - Barrios II.  - The medial cheeks and nose demonstrate inflammatory papules with background telangiectasias, consistent with rosacea.   - The left elbow x3 and right elbow x1 demonstrate white papules, consistent with milia.   - Multiple tan/brown macules and papules scattered throughout exam, consistent with benign nevi. No concerning features on dermoscopy.   - Scattered tan, homogenous macules scattered on sun exposed skin, consistent with solar lentigines.   - Scattered waxy, stuck on appearing papules and patches, consistent with seborrheic keratoses.    - Several 1-2 mm red dome shaped symmetric papules, consistent with cherry angiomas.     Medications:  Current Outpatient Medications   Medication Sig Dispense Refill    acyclovir (ZOVIRAX) 400 MG tablet Take 2 tablets (800 mg) by mouth every 12 hours 360 tablet 3    amLODIPine (NORVASC) 5 MG tablet Take 1 tablet (5 mg) by mouth daily 90 tablet 3    chlorthalidone (HYGROTON) 25 MG tablet Take 0.5 tablets (12.5 mg) by mouth daily 30  tablet 0    dexAMETHasone alcohol-free (DECADRON) 0.1 MG/ML solution Swish and spit 10 mLs (1 mg) in mouth 4 times daily Hold while on Valtrex 500 mL 3    escitalopram (LEXAPRO) 10 MG tablet Take 1 tablet (10 mg) by mouth daily 30 tablet 3    levothyroxine (SYNTHROID/LEVOTHROID) 112 MCG tablet Take 1 tablet (112 mcg) by mouth daily 90 tablet 4    nicotine polacrilex (NICORETTE) 4 MG gum Place 4 mg inside cheek daily      Nutritional Supplements (ENSURE COMPLETE SHAKE) LIQD Take 11 mLs by mouth every morning      omeprazole (PRILOSEC) 40 MG DR capsule Take 1 capsule (40 mg) by mouth daily 30 capsule 3    penicillin V (VEETID) 250 MG tablet Take 1 tablet (250 mg) by mouth 2 times daily 60 tablet 3    predniSONE (DELTASONE) 5 MG tablet Take 1 tablet (5 mg) by mouth daily Currently taking 15 mg daily ( on taper) 30 tablet 1    rosuvastatin (CRESTOR) 5 MG tablet Take 1 tablet (5 mg) by mouth daily 30 tablet 3    sennosides (SENOKOT) 8.6 MG tablet Take 1 tablet by mouth 3 times daily as needed for constipation 90 tablet 0    acetaminophen (TYLENOL) 500 MG tablet Take 500 mg by mouth as needed (Patient not taking: Reported on 5/9/2024)      Carboxymethylcell-Glycerin PF (REFRESH RELIEVA PF) 0.5-1 % SOLN Apply 1 drop to eye 4 times daily Preservative free. Either PF multidose bottle or PF vials (Patient not taking: Reported on 5/9/2024) 30 each 11    sodium chloride 0.9 % neb solution 3 mLs by Other route 2 times daily (Patient not taking: Reported on 5/9/2024) 300 mL 11    tacrolimus (PROTOPIC) 0.1 % external ointment Apply topically 2 times daily (Patient not taking: Reported on 4/12/2024) 30 g 1     No current facility-administered medications for this visit.      Past Medical History:   Patient Active Problem List   Diagnosis    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)    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)    Status post bone marrow transplant (H)    Musa-Barr virus viremia    Generalized  muscle weakness    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)    Shortness of breath    Influenza A    Postablative hypothyroidism    On corticosteroid therapy    History of Graves' disease    Fatigue, unspecified type    Combined forms of age-related cataract of both eyes    Dry eye    Hypogammaglobulinemia (H24)     Past Medical History:   Diagnosis Date    ALL (acute lymphocytic leukemia) (H)     CKD (chronic kidney disease)     GVHD (graft versus host disease) (H)     H/O peripheral stem cell transplant (H)     Hyperthyroidism 1996    Infection due to 2019 novel coronavirus 01/16/2023    Influenza A 11/2022    Postablative hypothyroidism 1997    Pulmonary embolism (H)     Renal cell carcinoma (H) 2007    right kidney    Sleep apnea        CC Chris Cote MD  [address] [facility] [address]  [place] [address] on close of this encounter.

## 2024-05-20 DIAGNOSIS — Z94.81 STATUS POST BONE MARROW TRANSPLANT (H): ICD-10-CM

## 2024-05-20 RX ORDER — CHLORTHALIDONE 25 MG/1
12.5 TABLET ORAL DAILY
Qty: 30 TABLET | Refills: 0 | Status: SHIPPED | OUTPATIENT
Start: 2024-05-20 | End: 2024-07-09

## 2024-05-21 DIAGNOSIS — Z94.81 STATUS POST BONE MARROW TRANSPLANT (H): ICD-10-CM

## 2024-05-21 RX ORDER — CHLORTHALIDONE 25 MG/1
12.5 TABLET ORAL DAILY
Qty: 30 TABLET | Refills: 0 | OUTPATIENT
Start: 2024-05-21

## 2024-05-22 ENCOUNTER — TELEPHONE (OUTPATIENT)
Dept: DERMATOLOGY | Facility: CLINIC | Age: 64
End: 2024-05-22
Payer: MEDICARE

## 2024-05-22 NOTE — TELEPHONE ENCOUNTER
Patient asked that I call back in about a week, in addition to sending him a MyChart message.    Sent Mychart (1st Attempt) and Patient Contacted for the patient to call back and schedule the following:    Appointment type: Return dermatology  Provider: Zoe Wesley  Return date: Approx. 5/17/2025  Specialty phone number: [phone]    ___________________________    Left Voicemail (2nd Attempt) for the patient to schedule:    Appointment type: Return dermatology  Provider: Zoe Wesley  Return date: Approx. 5/17/2025  Specialty phone number: [phone]

## 2024-05-31 ENCOUNTER — MYC MEDICAL ADVICE (OUTPATIENT)
Dept: TRANSPLANT | Facility: CLINIC | Age: 64
End: 2024-05-31
Payer: MEDICARE

## 2024-05-31 DIAGNOSIS — Z94.81 STATUS POST BONE MARROW TRANSPLANT (H): ICD-10-CM

## 2024-05-31 RX ORDER — LORAZEPAM 1 MG/1
1 TABLET ORAL
Qty: 30 TABLET | Refills: 0 | Status: SHIPPED | OUTPATIENT
Start: 2024-05-31 | End: 2024-08-05

## 2024-06-05 ENCOUNTER — LAB (OUTPATIENT)
Dept: LAB | Facility: CLINIC | Age: 64
End: 2024-06-05
Attending: INTERNAL MEDICINE
Payer: MEDICARE

## 2024-06-05 DIAGNOSIS — Z94.81 STATUS POST BONE MARROW TRANSPLANT (H): Primary | ICD-10-CM

## 2024-06-05 PROCEDURE — 250N000011 HC RX IP 250 OP 636: Performed by: INTERNAL MEDICINE

## 2024-06-05 PROCEDURE — 82784 ASSAY IGA/IGD/IGG/IGM EACH: CPT

## 2024-06-05 PROCEDURE — 36591 DRAW BLOOD OFF VENOUS DEVICE: CPT

## 2024-06-05 RX ORDER — HEPARIN SODIUM (PORCINE) LOCK FLUSH IV SOLN 100 UNIT/ML 100 UNIT/ML
5 SOLUTION INTRAVENOUS ONCE
Status: COMPLETED | OUTPATIENT
Start: 2024-06-05 | End: 2024-06-05

## 2024-06-05 RX ADMIN — Medication 5 ML: at 10:42

## 2024-06-05 NOTE — NURSING NOTE
Chief Complaint   Patient presents with    Port Draw     Labs collected from port by RN.      Port accessed with 20 gauge 3/4 inch gripper needle by RN, labs collected, line flushed with saline and heparin.  Vitals taken. Pt checked in for appointment(s).     Usha Sanabria RN

## 2024-06-06 LAB — IGG SERPL-MCNC: 508 MG/DL (ref 610–1616)

## 2024-06-07 DIAGNOSIS — C91.01 ACUTE LYMPHOBLASTIC LEUKEMIA (ALL) IN REMISSION (H): ICD-10-CM

## 2024-06-07 RX ORDER — PENICILLIN V POTASSIUM 250 MG/1
250 TABLET, FILM COATED ORAL 2 TIMES DAILY
Qty: 60 TABLET | Refills: 1 | Status: SHIPPED | OUTPATIENT
Start: 2024-06-07 | End: 2024-07-30

## 2024-06-10 ENCOUNTER — CARE COORDINATION (OUTPATIENT)
Dept: TRANSPLANT | Facility: CLINIC | Age: 64
End: 2024-06-10
Payer: MEDICARE

## 2024-06-10 DIAGNOSIS — D80.1 HYPOGAMMAGLOBULINEMIA (H): Primary | ICD-10-CM

## 2024-07-03 ENCOUNTER — LAB (OUTPATIENT)
Dept: LAB | Facility: CLINIC | Age: 64
End: 2024-07-03
Attending: INTERNAL MEDICINE
Payer: MEDICARE

## 2024-07-03 DIAGNOSIS — D80.1 HYPOGAMMAGLOBULINEMIA (H): ICD-10-CM

## 2024-07-03 PROCEDURE — 36591 DRAW BLOOD OFF VENOUS DEVICE: CPT

## 2024-07-03 PROCEDURE — 250N000011 HC RX IP 250 OP 636: Performed by: INTERNAL MEDICINE

## 2024-07-03 PROCEDURE — 82784 ASSAY IGA/IGD/IGG/IGM EACH: CPT

## 2024-07-03 RX ORDER — HEPARIN SODIUM (PORCINE) LOCK FLUSH IV SOLN 100 UNIT/ML 100 UNIT/ML
5 SOLUTION INTRAVENOUS ONCE
Status: COMPLETED | OUTPATIENT
Start: 2024-07-03 | End: 2024-07-03

## 2024-07-03 RX ADMIN — Medication 5 ML: at 11:02

## 2024-07-03 NOTE — NURSING NOTE
Chief Complaint   Patient presents with    Port Draw     Labs collected from port by RN. Vitals taken. Checked in for appointment(s).      Port accessed with 20 gauge 3/4 inch gripper needle by RN, labs collected, line flushed with saline and heparin.  Vitals taken. Pt checked in for appointment(s).     Usha Sanabria RN

## 2024-07-05 ENCOUNTER — DOCUMENTATION ONLY (OUTPATIENT)
Dept: PHARMACY | Facility: CLINIC | Age: 64
End: 2024-07-05
Payer: MEDICARE

## 2024-07-05 LAB — IGG SERPL-MCNC: 535 MG/DL (ref 610–1616)

## 2024-07-05 NOTE — PROGRESS NOTES
Pharmacist IVIG Stewardship Program    Diagnosis: Hypogammaglobulinemia   Date  4/12/24   IgG Level (mg/dL) 352     Current Dosing Regimen: Privigen 40g IV every 28 days PRN for IgG <400 mg/dL  Patient is dosed as needed based on an IgG levels of less than 400 mg/dL to ensure the lowest amount of medication is administered to achieve beneficial outcomes.  Pre-medications:   Acetaminophen 650 mg by mouth, 30 minutes prior to infusion  Diphenhydramine 50 mg by mouth, 30 minutes prior to infusion  Hydrocortisone 100 mg IV, 30 minutes prior to infusion  Current Titration Regimen: Start infusion at 0.5 ml/kg/hr x 15 min. If tolerated, increase rate by 0.5 ml/kg/hr every 15 min to a maximum of 4 ml/kg/hr.  Previously Tried Products: None     Side Effects: Unable to reach patient to discuss.     Interventions: Lab review completed. Care team notified.     Assessment:   Date  7/3/2024   IgG Level (mg/dL) 535   Patient is appropriate for additional IVIG therapy when their level is within parameter. IVIG infusions are effective in increasing IgG levels to an appropriate level to minimize patient's risk of infection. Patient should continue on treatment as they have been per provider order. Without therapy their levels would put them at an increased risk of infections.    Plan: Patient is not okay to proceed with infusions at this time as their level is above their provider ordered parameter. They will need to have labs drawn again next month to assess the need of ongoing infusions moving forward.     Next Review Needed: 8/2024    Genevieve Ramirez, PharmD, IgCP  Medication Access Pharmacist

## 2024-07-09 ENCOUNTER — OFFICE VISIT (OUTPATIENT)
Dept: NEPHROLOGY | Facility: CLINIC | Age: 64
End: 2024-07-09
Attending: INTERNAL MEDICINE
Payer: MEDICARE

## 2024-07-09 ENCOUNTER — LAB (OUTPATIENT)
Dept: LAB | Facility: CLINIC | Age: 64
End: 2024-07-09
Attending: INTERNAL MEDICINE
Payer: MEDICARE

## 2024-07-09 VITALS
DIASTOLIC BLOOD PRESSURE: 79 MMHG | OXYGEN SATURATION: 97 % | SYSTOLIC BLOOD PRESSURE: 126 MMHG | WEIGHT: 260.3 LBS | BODY MASS INDEX: 34.35 KG/M2 | HEART RATE: 85 BPM

## 2024-07-09 DIAGNOSIS — N18.2 CKD (CHRONIC KIDNEY DISEASE) STAGE 2, GFR 60-89 ML/MIN: Primary | ICD-10-CM

## 2024-07-09 DIAGNOSIS — I12.9 BENIGN HYPERTENSION WITH CKD (CHRONIC KIDNEY DISEASE), STAGE II: ICD-10-CM

## 2024-07-09 DIAGNOSIS — I10 HYPERTENSION, ESSENTIAL: ICD-10-CM

## 2024-07-09 DIAGNOSIS — N18.2 BENIGN HYPERTENSION WITH CKD (CHRONIC KIDNEY DISEASE), STAGE II: ICD-10-CM

## 2024-07-09 DIAGNOSIS — E55.9 VITAMIN D DEFICIENCY: ICD-10-CM

## 2024-07-09 DIAGNOSIS — D80.1 HYPOGAMMAGLOBULINEMIA (H): ICD-10-CM

## 2024-07-09 DIAGNOSIS — E83.52 HYPERCALCEMIA: ICD-10-CM

## 2024-07-09 LAB
ALBUMIN MFR UR ELPH: 85.8 MG/DL
ALBUMIN SERPL BCG-MCNC: 4.3 G/DL (ref 3.5–5.2)
ALBUMIN UR-MCNC: 70 MG/DL
ANION GAP SERPL CALCULATED.3IONS-SCNC: 10 MMOL/L (ref 7–15)
APPEARANCE UR: CLEAR
BILIRUB UR QL STRIP: NEGATIVE
BUN SERPL-MCNC: 35 MG/DL (ref 8–23)
CALCIUM SERPL-MCNC: 9.7 MG/DL (ref 8.8–10.2)
CHLORIDE SERPL-SCNC: 104 MMOL/L (ref 98–107)
COLOR UR AUTO: ABNORMAL
CREAT SERPL-MCNC: 1.32 MG/DL (ref 0.67–1.17)
CREAT UR-MCNC: 521 MG/DL
DEPRECATED HCO3 PLAS-SCNC: 24 MMOL/L (ref 22–29)
EGFRCR SERPLBLD CKD-EPI 2021: 59 ML/MIN/1.73M2
ERYTHROCYTE [DISTWIDTH] IN BLOOD BY AUTOMATED COUNT: 15.7 % (ref 10–15)
GLUCOSE SERPL-MCNC: 110 MG/DL (ref 70–99)
GLUCOSE UR STRIP-MCNC: NEGATIVE MG/DL
HCT VFR BLD AUTO: 34.9 % (ref 40–53)
HGB BLD-MCNC: 11.8 G/DL (ref 13.3–17.7)
HGB UR QL STRIP: NEGATIVE
KETONES UR STRIP-MCNC: ABNORMAL MG/DL
LEUKOCYTE ESTERASE UR QL STRIP: NEGATIVE
MCH RBC QN AUTO: 34.4 PG (ref 26.5–33)
MCHC RBC AUTO-ENTMCNC: 33.8 G/DL (ref 31.5–36.5)
MCV RBC AUTO: 102 FL (ref 78–100)
MUCOUS THREADS #/AREA URNS LPF: PRESENT /LPF
NITRATE UR QL: NEGATIVE
PH UR STRIP: 6 [PH] (ref 5–7)
PHOSPHATE SERPL-MCNC: 3.4 MG/DL (ref 2.5–4.5)
PLATELET # BLD AUTO: 224 10E3/UL (ref 150–450)
POTASSIUM SERPL-SCNC: 4.2 MMOL/L (ref 3.4–5.3)
PROT/CREAT 24H UR: 0.16 MG/MG CR (ref 0–0.2)
RBC # BLD AUTO: 3.43 10E6/UL (ref 4.4–5.9)
RBC URINE: 1 /HPF
SODIUM SERPL-SCNC: 138 MMOL/L (ref 135–145)
SP GR UR STRIP: 1.03 (ref 1–1.03)
UROBILINOGEN UR STRIP-MCNC: NORMAL MG/DL
WBC # BLD AUTO: 11.5 10E3/UL (ref 4–11)
WBC URINE: 1 /HPF

## 2024-07-09 PROCEDURE — 81001 URINALYSIS AUTO W/SCOPE: CPT

## 2024-07-09 PROCEDURE — 84156 ASSAY OF PROTEIN URINE: CPT

## 2024-07-09 PROCEDURE — 99215 OFFICE O/P EST HI 40 MIN: CPT | Mod: GC | Performed by: INTERNAL MEDICINE

## 2024-07-09 PROCEDURE — 82040 ASSAY OF SERUM ALBUMIN: CPT

## 2024-07-09 PROCEDURE — G2211 COMPLEX E/M VISIT ADD ON: HCPCS | Performed by: INTERNAL MEDICINE

## 2024-07-09 PROCEDURE — G0463 HOSPITAL OUTPT CLINIC VISIT: HCPCS | Performed by: INTERNAL MEDICINE

## 2024-07-09 PROCEDURE — 250N000011 HC RX IP 250 OP 636: Performed by: INTERNAL MEDICINE

## 2024-07-09 PROCEDURE — 36591 DRAW BLOOD OFF VENOUS DEVICE: CPT

## 2024-07-09 PROCEDURE — 85027 COMPLETE CBC AUTOMATED: CPT

## 2024-07-09 RX ORDER — HEPARIN SODIUM (PORCINE) LOCK FLUSH IV SOLN 100 UNIT/ML 100 UNIT/ML
5 SOLUTION INTRAVENOUS
Status: DISCONTINUED | OUTPATIENT
Start: 2024-07-09 | End: 2024-07-15 | Stop reason: HOSPADM

## 2024-07-09 RX ADMIN — HEPARIN 5 ML: 100 SYRINGE at 12:59

## 2024-07-09 ASSESSMENT — PAIN SCALES - GENERAL: PAINLEVEL: NO PAIN (0)

## 2024-07-09 NOTE — PROGRESS NOTES
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      Oncologic history: The patient was diagnosed with Ph+ ALL in 2020 after presented with feeling unwell and found to have leukocytosis and blast in his blood. He was treated with Dex and Dasatinib then 2 cycles of vincristine, prednisone, daunorubicin, and pegasparaginase with intrathecal methotrexate. Last ly, he received 1 course of high dose Jaymie-C. He achieved CR with no MRD. Subsequently, he underwent allo sib NMA (flu/cy+TBI) PBSCT on  8/10/21. hematocrit-CI was 3.  The patient was noted to be in CR with BmBx at D100, D180 and 1Y showing 100% donor. He has not been started on TKI due to previous multiple active issues. Post Tx complication included cGVHD at skin, eyes, o liver (all Bx proven, except eyes, clinically) started since 2/22. The current active cGVHDD involved eyes and skin. He was on Tacrolimus for cGVHD but later discontinued due to NEGRA, hyperkalemia hypomagnesemia, tremors and cramps in 9/22. Sirolimus was started in 9/21/22 in replacement of Tac. Cr improved, but the presented on 10/11/22 with ocular and cGVHD flare, fluid retention and gain 5-6 kg. BMT thought this is sirolimus Tox? (of note UPCR was only 0.4 and normal SAlb). Sirolimus was then stopped. Prednisone was started at 40 mg with slow tapering. He was also started on Belumosudil on 10/18/22 (ROCK2 inhibitors:Rho-associated coiled-coil kinase )  Other pertinent Hx:   - He had PE in the setting of receiving PEG asparaginase and was treated with Xarelto which is now completed.   - He had hyperferritinemia which now being followed closely. Not on iron chelators.  - He had COVID -19 in 5/22.  - CMV viremia  - Grave  disease  - HTN  Kidney history: He had RCC s/p Rt nephrectomy in 2007.  The patient has baseline creatinine of 0.9-1 pretransplant.  He developed NEGRA in 9/21 with Cr peaked at 1.89 but then come down to baseline. He had NEGRA again in 9/22 with Cr peaked at 1.8, suspect that due to tacrolimus toxicity and it was stopped. Cr improved but now still fluctuates between 1.1-1.3.  The most recent creatinine was on 10/31/2022 at 1.08.  Serum albumin is 3.3. UCPR is 0.39 g/g on 10/18/22. UA on 9/12/21 and 10/18/22 showed no protein, no blood.  However UA on 9/12/2021 showed 8 hyaline cast.      11/1/22: He is doing well except that he still has LE edema. It has improved from when he was on Sirolimus but still quite a bit. Edema has actually started since 2021 but unclear when.But it is certainly getting worse lately when he was treated with high dose steroid and sirolimus.His cGVHD is o/eva all stable but not completely gone. He was just started Belumosudil on 10/18/22 for cGVHD. He just saw BMT today and they are decreasing his steroid.Amlodipine started since 8/21 and possibly related to his swelling. He has multiple SEs from steroid such as weight gain , central obesity, easily bruised, increased appetite and partial insomnia. He has some nocturia. He has no dysuria or hematuria. Recently, he had cat scrath incidence and being treated with doxycycline. He has abdominal distension and gained weight progressively. He drinks 120 Oz of tea per day. We started chlorthalidone and reduced amlodipine.   12/1-12/3/22: Was admitted for dyspnea and malaise concerning for belumosudil intolerance. However, at the same time he also had flu A positive. He was taken off of amlodipine on 12/6/22. And subsequently chlorthalidone was off in 12/13/22 when Na was 128. Na improved afterwards.   1/10/23:  He feels good today. His BP are now very good 110-130/70 mmHg. He is not on any BP medication ayt this time. He is taking Belumosidil.  Symptoms of cGVHD have been improving.  He has some LE edema and easily bruised likely from steroid/belumosudil.   Labs: Creatinine 1.11, potassium 4.8,, dioxide 28, phosphorus 2.3, albumin 4.2, calcium 10.1.  Hemoglobin 11.3, platelet 140. UA pending.   6/13/23: In the interim, he went to the ED on 5/14/23 for testicular pain. CT showed no hernia or mass. Hew was also diagnosed with spine osteomyelitis and Epidural abscess and being treated with Ceftraixone for 6 weeks. Noted stable cGVHD. He also had COVID in 1/23. Now on physiologica dose of steroid. Still has dry eyes and a bit of mouth sore.  He drinks about 3-4 glasses of milk per day. He still has back pain that radiating downt to right leg. He completed ceftriaxone in 5/25/23. Appetite is stable as well as his weight. He has minimal swelling. He is on prednisone 4 mg alternate with 10 mg per day. Labs 6/13/23: Cr 1.10, BUN 46.2, K 5.1, Calcium 10.3, Phos 3.1, Albumin 4.0. Hb 11.7, Platelet 182, WBC 10.1.   10/16/23: In interim, he was diagnosed with L5-S1 discitis osteomyelitis at [facility] after presented with ongoing back pain.  MRI showing findings consistent with ongoing discitis-osteomyelitis at L5-S1 which has progressed since last imaging in June. He was seen by ID and neurosurgery. He underwent disc aspiration.  ID is recommending another 6 weeks course of antibiotics with a different regimen: Invanz and vancomycin. Neurosurgery did not recommend any surgical intervention at this time. Vanco dose was reduced on 10/10/23 due to kidney function and stopped last Thursday when Cr was 1.87. Lately, his blood pressure has been increasing to 150-160 and sometimes 180. I asked him to resume amlodipine 5 mg.  After stopping vancomycin, BP has improved along with his appetite. Cr has improved  to 1.68 on 10/16/23 ([facility] labs). He has gained some weight. His swelling was worse when he was admitted in the hospital. Now he is on Ertapenenm and clindamycin  oral until 10/23. Pain is mostly when he lays too long and going to get up. He is still using fentanyl patch but the dose was reduced. Belumosidil was just on hold.  Labs on 10/10/2023 showed creatinine 1.56 but Cr 1.678 on 10/16/23 ([facility] lab), BUN 26, GFR 49, potassium 3.5,, dioxide 28, calcium 10.2, albumin 4.1, alkaline phosphatase 141.  CBC show white blood cell 13, hemoglobin 10.9, platelet 213. Vancomycin at [facility] was 21.4 on 10/9/23. CRP 2.2 and ESR 46. UA on 10/12/23 showed no blood but UPCR is 0.2 g/g.   11/14/23: He has been on Augmentin and Doxycyline plan until end of this month . Clindamycin upsets his currently due to main complaint is low energy and restless leg syndrome that started about 2 weeks ago.  He recently stopped taking magnesium and iron a month ago.  Stomach. He has minor swelling. He is off of fentanyl 2 weeks ago. BP has been in the 120-130/80s. His Cr has improved and from [facility] lab, Cr 1.26 on 11/6/23. He has been eating and drinking well. He is on prednisone. He started to have restless leg.  Labs today show WBC 11.5, Hb 11.6.  Creatinine 1.4, EGFR 56, potassium 4.8, bicarb 24.  calcium 10.1 albumin 4.4.  CRP less than 3, sed rate 27.  UA showed protein 50 mg/dL.  Platelets of 2, RBC less than 1.UPCR pending.   2/20/24: He has relapsed of oral GVHD and prednisone was increased to 40 mg/d and now on tapering dose. He is also taking valtrex which has helped with HSV. In the meantime, his BP were in the 140-150 so chlorthalidone 12.5 mg was added at the end of January 2024. He also was admitted at the end of January for fever, chest pain but all work-up were unrevealing. He was treated him with Doxycycline for 14 days and now he feels better. Labs today showed Na 133, K 4.6, Bicarb 25, Cr 1.06 and BUN 40.6. CBC showed WBC 11.7, Hb 12.6. UA is bland. UPCR 0.27 g/g.   7/9/24: In the interim, he stopped chlorthalidone on 6/17/24 due to low BP and no swelling. He was hospitalized for PNA  between 5/28-6/1 and was initially given IV fluids and IV antibiotics but then transitioned to complete a 10-day course of cefdinir. Later on he has right leg pain, he presented to a local ER on June 11.  Venous Doppler US showed no DVT.  He briefly took Tylenol and ibuprofen for pain control.  Today, he has no fever but a bit of leg swelling. He wears compression stocking. He is on prednisone 10 milligram alternate 15 mg for GVHD.  Patient denies of any unexpected weight gain or weight loss, chest pain, shortness of breath, nausea, vomiting, change in urine output.  Labs today shows Cr 1.32, , K4.2, WBC 11.5, hemoglobin 11.8, UA spec gravity 1.031, urine protein albumin 70mg/dL, protein/creat urine 0.16    Past medical history  Past Medical History:   Diagnosis Date    ALL (acute lymphocytic leukemia) (H)     CKD (chronic kidney disease)     GVHD (graft versus host disease) (H)     H/O peripheral stem cell transplant (H)     Hyperthyroidism 1996    Infection due to 2019 novel coronavirus 01/16/2023    Influenza A 11/2022    Postablative hypothyroidism 1997    Pulmonary embolism (H)     Renal cell carcinoma (H) 2007    right kidney    Sleep apnea        Past surgical history  Past Surgical History:   Procedure Laterality Date    APPENDECTOMY      BACK SURGERY  2017    BONE MARROW BIOPSY, BONE SPECIMEN, NEEDLE/TROCAR Left 09/02/2021    Procedure: BIOPSY, BONE MARROW;  Surgeon: Jailyn Ny APRN CNP;  Location: [facility] OR    BONE MARROW BIOPSY, BONE SPECIMEN, NEEDLE/TROCAR Left 11/15/2021    Procedure: BIOPSY, BONE MARROW;  Surgeon: Socrates De La Torre;  Location: [facility] OR    BONE MARROW BIOPSY, BONE SPECIMEN, NEEDLE/TROCAR Left 02/07/2022    Procedure: BIOPSY, BONE MARROW;  Surgeon: Renetta Wiggins PA-C;  Location: [facility] OR    BONE MARROW BIOPSY, BONE SPECIMEN, NEEDLE/TROCAR Left 08/18/2022    Procedure: BIOPSY, BONE MARROW;  Surgeon: Lorrie Yap PA-C;  Location: [facility] OR    BONE MARROW BIOPSY,  BONE SPECIMEN, NEEDLE/TROCAR Left 08/07/2023    Procedure: Bone marrow biopsy, bone specimen, needle/trocar;  Surgeon: Teressa Rick PA-C;  Location: [facility] OR    BRONCHOSCOPY (RIGID OR FLEXIBLE), DIAGNOSTIC N/A 04/29/2022    Procedure: BRONCHOSCOPY, WITH BRONCHOALVEOLAR LAVAGE;  Surgeon: Murtaza Garcia MD;  Location: [facility]    IR CVC TUNNEL PLACEMENT > 5 YRS OF AGE  08/04/2021    IR CVC TUNNEL REMOVAL LEFT  09/02/2021    IR LIVER BIOPSY PERCUTANEOUS  02/21/2022    NEPHRECTOMY  2007    ORTHOPEDIC SURGERY      bilateral shoulder surgeries    PERCUTANEOUS BIOPSY LIVER N/A 02/21/2022    Procedure: NEEDLE BIOPSY, LIVER, PERCUTANEOUS;  Surgeon: Trace Castellanos MD;  Location: [facility] OR    PHACOEMULSIFICATION WITH STANDARD INTRAOCULAR LENS IMPLANT Left 1/15/2024    Procedure: LEFT EYE PHACOEMULSIFICATION, CATARACT, WITH STANDARD INTRAOCULAR LENS IMPLANT INSERTION;  Surgeon: Deshawn Menezes MD;  Location: [facility] OR    PHACOEMULSIFICATION WITH STANDARD INTRAOCULAR LENS IMPLANT Right 2/5/2024    Procedure: RIGHT EYE PHACOEMULSIFICATION, CATARACT, WITH STANDARD INTRAOCULAR LENS IMPLANT INSERTION;  Surgeon: Deshawn Menezes MD;  Location: [facility] OR       Review of Systems:   14 systems were reviewed and all negative except as mentioned above.   Current Medications:  Current Outpatient Medications   Medication Sig Dispense Refill    acetaminophen (TYLENOL) 500 MG tablet Take 500 mg by mouth as needed (Patient not taking: Reported on 5/9/2024)      acyclovir (ZOVIRAX) 400 MG tablet Take 2 tablets (800 mg) by mouth every 12 hours 360 tablet 3    amLODIPine (NORVASC) 5 MG tablet Take 1 tablet (5 mg) by mouth daily 90 tablet 3    Carboxymethylcell-Glycerin PF (REFRESH RELIEVA PF) 0.5-1 % SOLN Apply 1 drop to eye 4 times daily Preservative free. Either PF multidose bottle or PF vials (Patient not taking: Reported on 5/9/2024) 30 each 11    chlorthalidone (HYGROTON) 25 MG tablet Take 0.5 tablets (12.5 mg) by mouth daily  30 tablet 0    dexAMETHasone alcohol-free (DECADRON) 0.1 MG/ML solution Swish and spit 10 mLs (1 mg) in mouth 4 times daily Hold while on Valtrex 500 mL 3    escitalopram (LEXAPRO) 10 MG tablet Take 1 tablet (10 mg) by mouth daily 30 tablet 3    levothyroxine (SYNTHROID/LEVOTHROID) 112 MCG tablet Take 1 tablet (112 mcg) by mouth daily 90 tablet 4    LORazepam (ATIVAN) 1 MG tablet Take 1 tablet (1 mg) by mouth nightly as needed for anxiety or sleep 30 tablet 0    metroNIDAZOLE (METROCREAM) 0.75 % external cream Apply topically 2 times daily Apply to the nose. 45 g 3    nicotine polacrilex (NICORETTE) 4 MG gum Place 4 mg inside cheek daily      Nutritional Supplements (ENSURE COMPLETE SHAKE) LIQD Take 11 mLs by mouth every morning      omeprazole (PRILOSEC) 40 MG DR capsule Take 1 capsule (40 mg) by mouth daily 30 capsule 3    penicillin V (VEETID) 250 MG tablet Take 1 tablet (250 mg) by mouth 2 times daily 60 tablet 1    predniSONE (DELTASONE) 5 MG tablet Take 1 tablet (5 mg) by mouth daily Currently taking 15 mg daily ( on taper) 30 tablet 1    rosuvastatin (CRESTOR) 5 MG tablet Take 1 tablet (5 mg) by mouth daily 30 tablet 3    sennosides (SENOKOT) 8.6 MG tablet Take 1 tablet by mouth 3 times daily as needed for constipation 90 tablet 0    sodium chloride 0.9 % neb solution 3 mLs by Other route 2 times daily (Patient not taking: Reported on 5/9/2024) 300 mL 11    tacrolimus (PROTOPIC) 0.1 % external ointment Apply topically 2 times daily (Patient not taking: Reported on 4/12/2024) 30 g 1     No current facility-administered medications for this visit.       Physical Exam:   There were no vitals taken for this visit.   There is no height or weight on file to calculate BMI.  /79   Pulse 85   Wt 118.1 kg (260 lb 4.8 oz)   SpO2 97%   BMI 34.35 kg/m       GENERAL APPEARANCE: Alert, not in acute distress; pleasant. + Cushingoid appearance.   EYES:  Not pale conjunctiva, pupils equal  HENT: Mouth without ulcers  or lesions  PULM: lungs clear to auscultation bilaterally, equal air movement, no clubbing  CV: regular rhythm, normal rate, no rub     -JVD no distended.      -edema: No edema BLE.   GI: soft,  - tender, no distended, bowel sounds are present  INTEGUMENT: No rashes.   NEURO:  Non focal. No asterixis.     Labs:   All labs reviewed by me  Last Renal Panel:  Sodium   Date Value Ref Range Status   05/09/2024 138 135 - 145 mmol/L Final     Comment:     Reference intervals for this test were updated on 09/26/2023 to more accurately reflect our healthy population. There may be differences in the flagging of prior results with similar values performed with this method. Interpretation of those prior results can be made in the context of the updated reference intervals.    07/08/2021 139 133 - 144 mmol/L Final     Potassium   Date Value Ref Range Status   05/09/2024 4.6 3.4 - 5.3 mmol/L Final   11/15/2022 5.0 3.4 - 5.3 mmol/L Final   07/08/2021 3.9 3.4 - 5.3 mmol/L Final     Potassium POCT   Date Value Ref Range Status   12/01/2022 4.8 3.4 - 5.3 mmol/L Final     Chloride   Date Value Ref Range Status   05/09/2024 101 98 - 107 mmol/L Final   11/15/2022 101 94 - 109 mmol/L Final   07/08/2021 108 94 - 109 mmol/L Final     Carbon Dioxide   Date Value Ref Range Status   07/08/2021 23 20 - 32 mmol/L Final     Carbon Dioxide (CO2)   Date Value Ref Range Status   05/09/2024 24 22 - 29 mmol/L Final   11/15/2022 30 20 - 32 mmol/L Final     Anion Gap   Date Value Ref Range Status   05/09/2024 13 7 - 15 mmol/L Final   11/15/2022 3 3 - 14 mmol/L Final   07/08/2021 7 3 - 14 mmol/L Final     Glucose   Date Value Ref Range Status   05/09/2024 106 (H) 70 - 99 mg/dL Final   11/15/2022 113 (H) 70 - 99 mg/dL Final   07/08/2021 107 (H) 70 - 99 mg/dL Final     GLUCOSE BY METER POCT   Date Value Ref Range Status   12/03/2022 116 (H) 70 - 99 mg/dL Final     Urea Nitrogen   Date Value Ref Range Status   05/09/2024 34.1 (H) 8.0 - 23.0 mg/dL Final    11/15/2022 51 (H) 7 - 30 mg/dL Final   07/08/2021 24 7 - 30 mg/dL Final     Creatinine   Date Value Ref Range Status   05/09/2024 1.20 (H) 0.67 - 1.17 mg/dL Final   07/08/2021 0.94 0.66 - 1.25 mg/dL Final     GFR Estimate   Date Value Ref Range Status   05/09/2024 67 >60 mL/min/1.73m2 Final   07/08/2021 86 >60 mL/min/[1.73_m2] Final     Comment:     Non  GFR Calc  Starting 12/18/2018, serum creatinine based estimated GFR (eGFR) will be   calculated using the Chronic Kidney Disease Epidemiology Collaboration   (CKD-EPI) equation.       Calcium   Date Value Ref Range Status   05/09/2024 10.2 8.8 - 10.2 mg/dL Final   07/08/2021 8.9 8.5 - 10.1 mg/dL Final     Phosphorus   Date Value Ref Range Status   02/20/2024 3.4 2.5 - 4.5 mg/dL Final     Albumin   Date Value Ref Range Status   05/09/2024 4.3 3.5 - 5.2 g/dL Final   11/15/2022 3.6 3.4 - 5.0 g/dL Final   07/08/2021 3.7 3.4 - 5.0 g/dL Final       Imaging:  I reviewed imaging studies.     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 However, he had worsening swelling and increased Cr to 1.39. At that time, it was concerned that he may have sirolimus toxicity. But he only had UCPR 0.39 g/g at that time. However, Sirolimus was stopped. I started him on low dose chlorthalidone and reduced dose amlodipine and his swelling improved but he developed hypotension so all blood pressure medications  were discontinued.  His creatinine then came down to be stable at 1.1 with a EGFR in the 70s.  However, he developed acute kidney injury stage I again likely secondary to vancomycin toxicity in October 2023. Cr peaked at 1.87 on 10/12/23 but later on improved and now down to 1.0-1.1. Since he has PNA in Jun 2024, his Cr now in the range of 1.1-1.30. Cr today is 1.32 but he is very dehydrated, SpGr 1.32.  - Stay well hydrated, goal fluid intake 60-70 OZ daily  - Low salt diet; NA 2000 mg per day  # L5/S1 discitis; recurrent  Started on Ertapenem and Vanco since August 2023, plan for 6 weeks but Vanco stopped on 10/12/23 due to NEGRA  replaced with Clindamycin but then switched to Augmentin and doxycycline. Now completed Abx.  # Hypertension; well controlled  Blood pressure started to increase after he developed NEGRA in October 2023.  It was as high as 180s sometimes.  After he resumed amlodipine 5 mg, BP improved. However, BP worsened after escalation of prednisone dose in Dec 2023 so Cholrthalidone was added since 1/24/24 with improvement in swelling and BP. Previously, he has mild hypoNa but after cholthalidone stopped in 6/17/24, hypoNa improves.   - Continue Amlodipine 5 mg daily  - Was on chlorthalidone 12.5 mg daily stopped since 6/17/24 due to hypotension with SBP 80-90s  - If he has persistent hyponatremia worsening hyponatremia, considering switching to torsemide 5 mg per day  - Eventually, I am leaning toward starting ACEi/ARB for proteinuria. If BP not tolerated with combination of ACEi/ARB and amlodipine, will favor in discontinuing amlodipine to allow continuation of ACEi/ARB for proteinuria management  - Continue to avoid NSAIDs use  # Mild hypercalcemia; resolved  Calcium was mildly elevated at 10.3 and 10.1 on previous visits. Ca level today 9.7. The patient is taking calcium vitamin D 1 tabs per day. Vit D is 65 on 6/13/23. PTH is 58 on 12/21/23.  -Will repeat Vit D 25-OH level, PTH to review for next  visit  # Mild hypoNa  -Improved after Cholthalidone stopped.   # Hypogammaglobulinemia  IgG 535 on 7/3/24.  -Receives intermittent IVIG infusions for IgG levels <400    Patient seen and discussed with Keegan Chew MD Victor Bedros, MD  Nephrology and Hypertension Fellow PGY-4     Physician Attestation   IKeegan, saw and evaluated Nik MCDONALD Victorianocolt with Resident/Fellow. I have reviewed and discussed with the Resident/Fellow their history, physical and plan. I agree with the documented history, physical, assessment and plan provided in the resident/fellow's note.     I personally reviewed the vital signs, medications, labs, and imaging.    Rest per the resident/fellow's note.   I personally spent 40 minutes on the date of the encounter doing chart review, history and exam, documentation and further activities per the note.    The longitudinal plan of care for CKD stage 2/3, HTN, post ASCT, GVHD was addressed during this visit. Due to the added complexity in care, I will continue to support Nik Nava in the subsequent management of this condition(s) and with the ongoing continuity of care of this condition(s).   Follow-up in 4 months with labs    Keegan Chew MD on 07/09/2024

## 2024-07-09 NOTE — PATIENT INSTRUCTIONS
Things are stable  Drink about 60-70 Oz per day  Take NSAIDs as low as you can, try to avoid that  See youi in 6 months

## 2024-07-09 NOTE — LETTER
7/9/2024       RE: Nik Nava  [id number] [facility] [id number]     Dear Colleague,    Thank you for referring your patient, Nik Nava, to the [facility] [place] at [facility]. Please see a copy of my visit note below.      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      Oncologic history: The patient was diagnosed with Ph+ ALL in 2020 after presented with feeling unwell and found to have leukocytosis and blast in his blood. He was treated with Dex and Dasatinib then 2 cycles of vincristine, prednisone, daunorubicin, and pegasparaginase with intrathecal methotrexate. Last ly, he received 1 course of high dose Jaymie-C. He achieved CR with no MRD. Subsequently, he underwent allo sib NMA (flu/cy+TBI) PBSCT on  8/10/21. hematocrit-CI was 3.  The patient was noted to be in CR with BmBx at D100, D180 and 1Y showing 100% donor. He has not been started on TKI due to previous multiple active issues. Post Tx complication included cGVHD at skin, eyes, o liver (all Bx proven, except eyes, clinically) started since 2/22. The current active cGVHDD involved eyes and skin. He was on Tacrolimus for cGVHD but later discontinued due to NEGRA, hyperkalemia hypomagnesemia, tremors and cramps in 9/22. Sirolimus was started in 9/21/22 in replacement of Tac. Cr improved, but the presented on 10/11/22 with ocular and cGVHD flare, fluid retention and gain 5-6 kg. BMT thought this is sirolimus Tox? (of note UPCR was only 0.4 and normal SAlb). Sirolimus was then stopped. Prednisone was started at 40 mg with slow tapering. He was also started on Belumosudil on 10/18/22 (ROCK2  inhibitors:Rho-associated coiled-coil kinase )  Other pertinent Hx:   - He had PE in the setting of receiving PEG asparaginase and was treated with Xarelto which is now completed.   - He had hyperferritinemia which now being followed closely. Not on iron chelators.  - He had COVID -19 in 5/22.  - CMV viremia  - Grave disease  - HTN  Kidney history: He had RCC s/p Rt nephrectomy in 2007.  The patient has baseline creatinine of 0.9-1 pretransplant.  He developed NEGRA in 9/21 with Cr peaked at 1.89 but then come down to baseline. He had NEGRA again in 9/22 with Cr peaked at 1.8, suspect that due to tacrolimus toxicity and it was stopped. Cr improved but now still fluctuates between 1.1-1.3.  The most recent creatinine was on 10/31/2022 at 1.08.  Serum albumin is 3.3. UCPR is 0.39 g/g on 10/18/22. UA on 9/12/21 and 10/18/22 showed no protein, no blood.  However UA on 9/12/2021 showed 8 hyaline cast.      11/1/22: He is doing well except that he still has LE edema. It has improved from when he was on Sirolimus but still quite a bit. Edema has actually started since 2021 but unclear when.But it is certainly getting worse lately when he was treated with high dose steroid and sirolimus.His cGVHD is o/eva all stable but not completely gone. He was just started Belumosudil on 10/18/22 for cGVHD. He just saw BMT today and they are decreasing his steroid.Amlodipine started since 8/21 and possibly related to his swelling. He has multiple SEs from steroid such as weight gain , central obesity, easily bruised, increased appetite and partial insomnia. He has some nocturia. He has no dysuria or hematuria. Recently, he had cat scrath incidence and being treated with doxycycline. He has abdominal distension and gained weight progressively. He drinks 120 Oz of tea per day. We started chlorthalidone and reduced amlodipine.   12/1-12/3/22: Was admitted for dyspnea and malaise concerning for belumosudil intolerance. However, at the same time  he also had flu A positive. He was taken off of amlodipine on 12/6/22. And subsequently chlorthalidone was off in 12/13/22 when Na was 128. Na improved afterwards.   1/10/23:  He feels good today. His BP are now very good 110-130/70 mmHg. He is not on any BP medication ayt this time. He is taking Belumosidil. Symptoms of cGVHD have been improving.  He has some LE edema and easily bruised likely from steroid/belumosudil.   Labs: Creatinine 1.11, potassium 4.8,, dioxide 28, phosphorus 2.3, albumin 4.2, calcium 10.1.  Hemoglobin 11.3, platelet 140. UA pending.   6/13/23: In the interim, he went to the ED on 5/14/23 for testicular pain. CT showed no hernia or mass. Hew was also diagnosed with spine osteomyelitis and Epidural abscess and being treated with Ceftraixone for 6 weeks. Noted stable cGVHD. He also had COVID in 1/23. Now on physiologica dose of steroid. Still has dry eyes and a bit of mouth sore.  He drinks about 3-4 glasses of milk per day. He still has back pain that radiating downt to right leg. He completed ceftriaxone in 5/25/23. Appetite is stable as well as his weight. He has minimal swelling. He is on prednisone 4 mg alternate with 10 mg per day. Labs 6/13/23: Cr 1.10, BUN 46.2, K 5.1, Calcium 10.3, Phos 3.1, Albumin 4.0. Hb 11.7, Platelet 182, WBC 10.1.   10/16/23: In interim, he was diagnosed with L5-S1 discitis osteomyelitis at [facility] after presented with ongoing back pain.  MRI showing findings consistent with ongoing discitis-osteomyelitis at L5-S1 which has progressed since last imaging in June. He was seen by ID and neurosurgery. He underwent disc aspiration.  ID is recommending another 6 weeks course of antibiotics with a different regimen: Invanz and vancomycin. Neurosurgery did not recommend any surgical intervention at this time. Vanco dose was reduced on 10/10/23 due to kidney function and stopped last Thursday when Cr was 1.87. Lately, his blood pressure has been increasing to  150-160 and sometimes 180. I asked him to resume amlodipine 5 mg.  After stopping vancomycin, BP has improved along with his appetite. Cr has improved  to 1.68 on 10/16/23 ([facility] labs). He has gained some weight. His swelling was worse when he was admitted in the hospital. Now he is on Ertapenenm and clindamycin oral until 10/23. Pain is mostly when he lays too long and going to get up. He is still using fentanyl patch but the dose was reduced. Belumosidil was just on hold.  Labs on 10/10/2023 showed creatinine 1.56 but Cr 1.678 on 10/16/23 ([facility] lab), BUN 26, GFR 49, potassium 3.5,, dioxide 28, calcium 10.2, albumin 4.1, alkaline phosphatase 141.  CBC show white blood cell 13, hemoglobin 10.9, platelet 213. Vancomycin at [facility] was 21.4 on 10/9/23. CRP 2.2 and ESR 46. UA on 10/12/23 showed no blood but UPCR is 0.2 g/g.   11/14/23: He has been on Augmentin and Doxycyline plan until end of this month . Clindamycin upsets his currently due to main complaint is low energy and restless leg syndrome that started about 2 weeks ago.  He recently stopped taking magnesium and iron a month ago.  Stomach. He has minor swelling. He is off of fentanyl 2 weeks ago. BP has been in the 120-130/80s. His Cr has improved and from [facility] lab, Cr 1.26 on 11/6/23. He has been eating and drinking well. He is on prednisone. He started to have restless leg.  Labs today show WBC 11.5, Hb 11.6.  Creatinine 1.4, EGFR 56, potassium 4.8, bicarb 24.  calcium 10.1 albumin 4.4.  CRP less than 3, sed rate 27.  UA showed protein 50 mg/dL.  Platelets of 2, RBC less than 1.UPCR pending.   2/20/24: He has relapsed of oral GVHD and prednisone was increased to 40 mg/d and now on tapering dose. He is also taking valtrex which has helped with HSV. In the meantime, his BP were in the 140-150 so chlorthalidone 12.5 mg was added at the end of January 2024. He also was admitted at the end of January for fever, chest pain but all work-up were unrevealing. He  was treated him with Doxycycline for 14 days and now he feels better. Labs today showed Na 133, K 4.6, Bicarb 25, Cr 1.06 and BUN 40.6. CBC showed WBC 11.7, Hb 12.6. UA is bland. UPCR 0.27 g/g.   7/9/24: In the interim, he stopped chlorthalidone on 6/17/24 due to low BP and no swelling. He was hospitalized for PNA between 5/28-6/1 and was initially given IV fluids and IV antibiotics but then transitioned to complete a 10-day course of cefdinir. Later on he has right leg pain, he presented to a local ER on June 11.  Venous Doppler US showed no DVT.  He briefly took Tylenol and ibuprofen for pain control.  Today, he has no fever but a bit of leg swelling. He wears compression stocking. He is on prednisone 10 milligram alternate 15 mg for GVHD.  Patient denies of any unexpected weight gain or weight loss, chest pain, shortness of breath, nausea, vomiting, change in urine output.  Labs today shows Cr 1.32, , K4.2, WBC 11.5, hemoglobin 11.8, UA spec gravity 1.031, urine protein albumin 70mg/dL, protein/creat urine 0.16    Past medical history  Past Medical History:   Diagnosis Date     ALL (acute lymphocytic leukemia) (H)      CKD (chronic kidney disease)      GVHD (graft versus host disease) (H)      H/O peripheral stem cell transplant (H)      Hyperthyroidism 1996     Infection due to 2019 novel coronavirus 01/16/2023     Influenza A 11/2022     Postablative hypothyroidism 1997     Pulmonary embolism (H)      Renal cell carcinoma (H) 2007    right kidney     Sleep apnea        Past surgical history  Past Surgical History:   Procedure Laterality Date     APPENDECTOMY       BACK SURGERY  2017     BONE MARROW BIOPSY, BONE SPECIMEN, NEEDLE/TROCAR Left 09/02/2021    Procedure: BIOPSY, BONE MARROW;  Surgeon: Jailyn Ny APRN CNP;  Location: [facility] OR     BONE MARROW BIOPSY, BONE SPECIMEN, NEEDLE/TROCAR Left 11/15/2021    Procedure: BIOPSY, BONE MARROW;  Surgeon: Socrates De La Torre;  Location: [facility] OR     BONE  MARROW BIOPSY, BONE SPECIMEN, NEEDLE/TROCAR Left 02/07/2022    Procedure: BIOPSY, BONE MARROW;  Surgeon: Renetta Wiggins PA-C;  Location: [facility] OR     BONE MARROW BIOPSY, BONE SPECIMEN, NEEDLE/TROCAR Left 08/18/2022    Procedure: BIOPSY, BONE MARROW;  Surgeon: Lorrie Yap PA-C;  Location: [facility] OR     BONE MARROW BIOPSY, BONE SPECIMEN, NEEDLE/TROCAR Left 08/07/2023    Procedure: Bone marrow biopsy, bone specimen, needle/trocar;  Surgeon: Teressa Rick PA-C;  Location: [facility] OR     BRONCHOSCOPY (RIGID OR FLEXIBLE), DIAGNOSTIC N/A 04/29/2022    Procedure: BRONCHOSCOPY, WITH BRONCHOALVEOLAR LAVAGE;  Surgeon: Murtaza Garcia MD;  Location: [facility]     IR CVC TUNNEL PLACEMENT > 5 YRS OF AGE  08/04/2021     IR CVC TUNNEL REMOVAL LEFT  09/02/2021     IR LIVER BIOPSY PERCUTANEOUS  02/21/2022     NEPHRECTOMY  2007     ORTHOPEDIC SURGERY      bilateral shoulder surgeries     PERCUTANEOUS BIOPSY LIVER N/A 02/21/2022    Procedure: NEEDLE BIOPSY, LIVER, PERCUTANEOUS;  Surgeon: Trace Castellanos MD;  Location: [facility] OR     PHACOEMULSIFICATION WITH STANDARD INTRAOCULAR LENS IMPLANT Left 1/15/2024    Procedure: LEFT EYE PHACOEMULSIFICATION, CATARACT, WITH STANDARD INTRAOCULAR LENS IMPLANT INSERTION;  Surgeon: Deshawn Menezes MD;  Location: [facility] OR     PHACOEMULSIFICATION WITH STANDARD INTRAOCULAR LENS IMPLANT Right 2/5/2024    Procedure: RIGHT EYE PHACOEMULSIFICATION, CATARACT, WITH STANDARD INTRAOCULAR LENS IMPLANT INSERTION;  Surgeon: Deshawn Menezes MD;  Location: [facility] OR       Review of Systems:   14 systems were reviewed and all negative except as mentioned above.   Current Medications:  Current Outpatient Medications   Medication Sig Dispense Refill     acetaminophen (TYLENOL) 500 MG tablet Take 500 mg by mouth as needed (Patient not taking: Reported on 5/9/2024)       acyclovir (ZOVIRAX) 400 MG tablet Take 2 tablets (800 mg) by mouth every 12 hours 360 tablet 3     amLODIPine (NORVASC) 5 MG tablet  Take 1 tablet (5 mg) by mouth daily 90 tablet 3     Carboxymethylcell-Glycerin PF (REFRESH RELIEVA PF) 0.5-1 % SOLN Apply 1 drop to eye 4 times daily Preservative free. Either PF multidose bottle or PF vials (Patient not taking: Reported on 5/9/2024) 30 each 11     chlorthalidone (HYGROTON) 25 MG tablet Take 0.5 tablets (12.5 mg) by mouth daily 30 tablet 0     dexAMETHasone alcohol-free (DECADRON) 0.1 MG/ML solution Swish and spit 10 mLs (1 mg) in mouth 4 times daily Hold while on Valtrex 500 mL 3     escitalopram (LEXAPRO) 10 MG tablet Take 1 tablet (10 mg) by mouth daily 30 tablet 3     levothyroxine (SYNTHROID/LEVOTHROID) 112 MCG tablet Take 1 tablet (112 mcg) by mouth daily 90 tablet 4     LORazepam (ATIVAN) 1 MG tablet Take 1 tablet (1 mg) by mouth nightly as needed for anxiety or sleep 30 tablet 0     metroNIDAZOLE (METROCREAM) 0.75 % external cream Apply topically 2 times daily Apply to the nose. 45 g 3     nicotine polacrilex (NICORETTE) 4 MG gum Place 4 mg inside cheek daily       Nutritional Supplements (ENSURE COMPLETE SHAKE) LIQD Take 11 mLs by mouth every morning       omeprazole (PRILOSEC) 40 MG DR capsule Take 1 capsule (40 mg) by mouth daily 30 capsule 3     penicillin V (VEETID) 250 MG tablet Take 1 tablet (250 mg) by mouth 2 times daily 60 tablet 1     predniSONE (DELTASONE) 5 MG tablet Take 1 tablet (5 mg) by mouth daily Currently taking 15 mg daily ( on taper) 30 tablet 1     rosuvastatin (CRESTOR) 5 MG tablet Take 1 tablet (5 mg) by mouth daily 30 tablet 3     sennosides (SENOKOT) 8.6 MG tablet Take 1 tablet by mouth 3 times daily as needed for constipation 90 tablet 0     sodium chloride 0.9 % neb solution 3 mLs by Other route 2 times daily (Patient not taking: Reported on 5/9/2024) 300 mL 11     tacrolimus (PROTOPIC) 0.1 % external ointment Apply topically 2 times daily (Patient not taking: Reported on 4/12/2024) 30 g 1     No current facility-administered medications for this visit.        Physical Exam:   There were no vitals taken for this visit.   There is no height or weight on file to calculate BMI.  /79   Pulse 85   Wt 118.1 kg (260 lb 4.8 oz)   SpO2 97%   BMI 34.35 kg/m       GENERAL APPEARANCE: Alert, not in acute distress; pleasant. + Cushingoid appearance.   EYES:  Not pale conjunctiva, pupils equal  HENT: Mouth without ulcers or lesions  PULM: lungs clear to auscultation bilaterally, equal air movement, no clubbing  CV: regular rhythm, normal rate, no rub     -JVD no distended.      -edema: No edema BLE.   GI: soft,  - tender, no distended, bowel sounds are present  INTEGUMENT: No rashes.   NEURO:  Non focal. No asterixis.     Labs:   All labs reviewed by me  Last Renal Panel:  Sodium   Date Value Ref Range Status   05/09/2024 138 135 - 145 mmol/L Final     Comment:     Reference intervals for this test were updated on 09/26/2023 to more accurately reflect our healthy population. There may be differences in the flagging of prior results with similar values performed with this method. Interpretation of those prior results can be made in the context of the updated reference intervals.    07/08/2021 139 133 - 144 mmol/L Final     Potassium   Date Value Ref Range Status   05/09/2024 4.6 3.4 - 5.3 mmol/L Final   11/15/2022 5.0 3.4 - 5.3 mmol/L Final   07/08/2021 3.9 3.4 - 5.3 mmol/L Final     Potassium POCT   Date Value Ref Range Status   12/01/2022 4.8 3.4 - 5.3 mmol/L Final     Chloride   Date Value Ref Range Status   05/09/2024 101 98 - 107 mmol/L Final   11/15/2022 101 94 - 109 mmol/L Final   07/08/2021 108 94 - 109 mmol/L Final     Carbon Dioxide   Date Value Ref Range Status   07/08/2021 23 20 - 32 mmol/L Final     Carbon Dioxide (CO2)   Date Value Ref Range Status   05/09/2024 24 22 - 29 mmol/L Final   11/15/2022 30 20 - 32 mmol/L Final     Anion Gap   Date Value Ref Range Status   05/09/2024 13 7 - 15 mmol/L Final   11/15/2022 3 3 - 14 mmol/L Final   07/08/2021 7 3 - 14  mmol/L Final     Glucose   Date Value Ref Range Status   05/09/2024 106 (H) 70 - 99 mg/dL Final   11/15/2022 113 (H) 70 - 99 mg/dL Final   07/08/2021 107 (H) 70 - 99 mg/dL Final     GLUCOSE BY METER POCT   Date Value Ref Range Status   12/03/2022 116 (H) 70 - 99 mg/dL Final     Urea Nitrogen   Date Value Ref Range Status   05/09/2024 34.1 (H) 8.0 - 23.0 mg/dL Final   11/15/2022 51 (H) 7 - 30 mg/dL Final   07/08/2021 24 7 - 30 mg/dL Final     Creatinine   Date Value Ref Range Status   05/09/2024 1.20 (H) 0.67 - 1.17 mg/dL Final   07/08/2021 0.94 0.66 - 1.25 mg/dL Final     GFR Estimate   Date Value Ref Range Status   05/09/2024 67 >60 mL/min/1.73m2 Final   07/08/2021 86 >60 mL/min/[1.73_m2] Final     Comment:     Non  GFR Calc  Starting 12/18/2018, serum creatinine based estimated GFR (eGFR) will be   calculated using the Chronic Kidney Disease Epidemiology Collaboration   (CKD-EPI) equation.       Calcium   Date Value Ref Range Status   05/09/2024 10.2 8.8 - 10.2 mg/dL Final   07/08/2021 8.9 8.5 - 10.1 mg/dL Final     Phosphorus   Date Value Ref Range Status   02/20/2024 3.4 2.5 - 4.5 mg/dL Final     Albumin   Date Value Ref Range Status   05/09/2024 4.3 3.5 - 5.2 g/dL Final   11/15/2022 3.6 3.4 - 5.0 g/dL Final   07/08/2021 3.7 3.4 - 5.0 g/dL Final       Imaging:  I reviewed imaging studies.     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 However, he had worsening swelling and increased Cr to 1.39. At that time, it was concerned that he may have sirolimus toxicity. But he only had UCPR 0.39 g/g at that time. However, Sirolimus was stopped. I started him on low dose chlorthalidone and reduced dose amlodipine and his swelling improved but he developed hypotension so all blood pressure medications were discontinued.  His creatinine then came down to be stable at 1.1 with a EGFR in the 70s.  However, he developed acute kidney injury stage I again likely secondary to vancomycin toxicity in October 2023. Cr peaked at 1.87 on 10/12/23 but later on improved and now down to 1.0-1.1. Since he has PNA in Jun 2024, his Cr now in the range of 1.1-1.30. Cr today is 1.32 but he is very dehydrated, SpGr 1.32.  - Stay well hydrated, goal fluid intake 60-70 OZ daily  - Low salt diet; NA 2000 mg per day  # L5/S1 discitis; recurrent  Started on Ertapenem and Vanco since August 2023, plan for 6 weeks but Vanco stopped on 10/12/23 due to NEGRA  replaced with Clindamycin but then switched to Augmentin and doxycycline. Now completed Abx.  # Hypertension; well controlled  Blood pressure started to increase after he developed NEGRA in October 2023.  It was as high as 180s sometimes.  After he resumed amlodipine 5 mg, BP improved. However, BP worsened after escalation of prednisone dose in Dec 2023 so Cholrthalidone was added since 1/24/24 with improvement in swelling and BP. Previously, he has mild hypoNa but after cholthalidone stopped in 6/17/24, hypoNa improves.   - Continue Amlodipine 5 mg daily  - Was on chlorthalidone 12.5 mg daily stopped since 6/17/24 due to hypotension with SBP 80-90s  - If he has persistent hyponatremia worsening hyponatremia, considering switching to torsemide 5 mg per day  - Eventually, I am leaning toward starting ACEi/ARB for proteinuria. If BP not tolerated with combination of ACEi/ARB and amlodipine, will favor in discontinuing  amlodipine to allow continuation of ACEi/ARB for proteinuria management  - Continue to avoid NSAIDs use  # Mild hypercalcemia; resolved  Calcium was mildly elevated at 10.3 and 10.1 on previous visits. Ca level today 9.7. The patient is taking calcium vitamin D 1 tabs per day. Vit D is 65 on 6/13/23. PTH is 58 on 12/21/23.  -Will repeat Vit D 25-OH level, PTH to review for next visit  # Mild hypoNa  -Improved after Cholthalidone stopped.   # Hypogammaglobulinemia  IgG 535 on 7/3/24.  -Receives intermittent IVIG infusions for IgG levels <400    Patient seen and discussed with Keegan Chew MD Victor Bedros, MD  Nephrology and Hypertension Fellow PGY-4     Follow-up in 4 months with labs    The longitudinal plan of care for CKD stage 2/3, HTN, post ASCT, GVHD was addressed during this visit. Due to the added complexity in care, I will continue to support Nik Nava in the subsequent management of this condition(s) and with the ongoing continuity of care of this condition(s).     I spent  40 minutes on the date of the encounter doing chart review, history and exam, documentation and further activities as noted above. 22 minutes of this visit is dedicated to direct patient interaction via face to face.     Keegan Chew MD on 07/09/2024            Again, thank you for allowing me to participate in the care of your patient.      Sincerely,    Keegan Chew MD

## 2024-07-09 NOTE — NURSING NOTE
Chief Complaint   Patient presents with    Port Draw     Lab only port accessed, labs drawn, heparin locked, deaccessed    There were no vitals taken for this visit.  Stewart Jordan RN on 7/9/2024 at 12:59 PM

## 2024-07-09 NOTE — NURSING NOTE
Chief Complaint   Patient presents with    RECHECK     5 month follow up. Stopped taking chlorthalidone.      Vitals:    07/09/24 1305 07/09/24 1306 07/09/24 1308 07/09/24 1309   BP: 132/81 120/78 125/79 126/79   BP Location: Right arm Right arm Right arm    Patient Position: Sitting Sitting Sitting    Cuff Size: Adult Regular Adult Regular Adult Regular    Pulse: 85      SpO2: 97%      Weight: 118.1 kg (260 lb 4.8 oz)          BP Readings from Last 3 Encounters:   07/09/24 126/79   05/09/24 121/78   04/12/24 127/83       /79   Pulse 85   Wt 118.1 kg (260 lb 4.8 oz)   SpO2 97%   BMI 34.35 kg/m       Christiana Mejia

## 2024-07-29 DIAGNOSIS — T86.09 GVHD AS COMPLICATION OF BONE MARROW TRANSPLANT (H): ICD-10-CM

## 2024-07-29 DIAGNOSIS — D89.813 GVHD AS COMPLICATION OF BONE MARROW TRANSPLANT (H): ICD-10-CM

## 2024-07-30 DIAGNOSIS — C91.01 ACUTE LYMPHOBLASTIC LEUKEMIA (ALL) IN REMISSION (H): ICD-10-CM

## 2024-07-30 RX ORDER — PENICILLIN V POTASSIUM 250 MG/1
TABLET, FILM COATED ORAL
Qty: 60 TABLET | Refills: 1 | Status: SHIPPED | OUTPATIENT
Start: 2024-07-30 | End: 2024-10-01

## 2024-08-01 RX ORDER — PREDNISONE 10 MG/1
10 TABLET ORAL DAILY
Qty: 30 TABLET | Refills: 3 | OUTPATIENT
Start: 2024-08-01

## 2024-08-01 NOTE — TELEPHONE ENCOUNTER
Patient has enough prednisone to get him to his next appointment with Dr. Cote 8/5. At this time we will reassess prednisone dose.

## 2024-08-05 ENCOUNTER — LAB (OUTPATIENT)
Dept: LAB | Facility: CLINIC | Age: 64
End: 2024-08-05
Attending: INTERNAL MEDICINE
Payer: MEDICARE

## 2024-08-05 ENCOUNTER — ONCOLOGY VISIT (OUTPATIENT)
Dept: TRANSPLANT | Facility: CLINIC | Age: 64
End: 2024-08-05
Attending: INTERNAL MEDICINE
Payer: MEDICARE

## 2024-08-05 VITALS
BODY MASS INDEX: 34.26 KG/M2 | OXYGEN SATURATION: 96 % | HEART RATE: 57 BPM | WEIGHT: 259.6 LBS | RESPIRATION RATE: 20 BRPM | SYSTOLIC BLOOD PRESSURE: 123 MMHG | DIASTOLIC BLOOD PRESSURE: 75 MMHG | TEMPERATURE: 97.9 F

## 2024-08-05 DIAGNOSIS — D89.813 GVHD AS COMPLICATION OF BONE MARROW TRANSPLANT (H): Primary | ICD-10-CM

## 2024-08-05 DIAGNOSIS — D80.1 HYPOGAMMAGLOBULINEMIA (H): ICD-10-CM

## 2024-08-05 DIAGNOSIS — T86.09 GVHD AS COMPLICATION OF BONE MARROW TRANSPLANT (H): Primary | ICD-10-CM

## 2024-08-05 LAB
HOLD SPECIMEN: NORMAL
HOLD SPECIMEN: NORMAL
IGG SERPL-MCNC: 542 MG/DL (ref 610–1616)

## 2024-08-05 PROCEDURE — 82784 ASSAY IGA/IGD/IGG/IGM EACH: CPT | Performed by: INTERNAL MEDICINE

## 2024-08-05 PROCEDURE — 36591 DRAW BLOOD OFF VENOUS DEVICE: CPT | Performed by: INTERNAL MEDICINE

## 2024-08-05 PROCEDURE — 36415 COLL VENOUS BLD VENIPUNCTURE: CPT

## 2024-08-05 PROCEDURE — 99214 OFFICE O/P EST MOD 30 MIN: CPT | Performed by: INTERNAL MEDICINE

## 2024-08-05 PROCEDURE — 85014 HEMATOCRIT: CPT

## 2024-08-05 PROCEDURE — G0463 HOSPITAL OUTPT CLINIC VISIT: HCPCS | Performed by: INTERNAL MEDICINE

## 2024-08-05 PROCEDURE — 84155 ASSAY OF PROTEIN SERUM: CPT | Performed by: INTERNAL MEDICINE

## 2024-08-05 PROCEDURE — G2211 COMPLEX E/M VISIT ADD ON: HCPCS | Performed by: INTERNAL MEDICINE

## 2024-08-05 PROCEDURE — 82374 ASSAY BLOOD CARBON DIOXIDE: CPT | Performed by: INTERNAL MEDICINE

## 2024-08-05 PROCEDURE — 250N000011 HC RX IP 250 OP 636: Performed by: INTERNAL MEDICINE

## 2024-08-05 RX ORDER — HEPARIN SODIUM (PORCINE) LOCK FLUSH IV SOLN 100 UNIT/ML 100 UNIT/ML
5 SOLUTION INTRAVENOUS ONCE
Status: COMPLETED | OUTPATIENT
Start: 2024-08-05 | End: 2024-08-05

## 2024-08-05 RX ORDER — PREDNISONE 10 MG/1
10 TABLET ORAL DAILY
Qty: 90 TABLET | Refills: 2 | Status: SHIPPED | OUTPATIENT
Start: 2024-08-05

## 2024-08-05 RX ORDER — SODIUM FLUORIDE 6 MG/ML
PASTE, DENTIFRICE DENTAL
COMMUNITY
Start: 2024-02-23

## 2024-08-05 RX ORDER — PREDNISONE 10 MG/1
TABLET ORAL
COMMUNITY
Start: 2024-06-26 | End: 2024-08-05

## 2024-08-05 RX ADMIN — Medication 5 ML: at 09:42

## 2024-08-05 ASSESSMENT — PAIN SCALES - GENERAL
PAINLEVEL: NO PAIN (0)
PAINLEVEL: NO PAIN (0)

## 2024-08-05 NOTE — PROGRESS NOTES
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 Recent oral HSV and oral cGVHD flare treated with prednisone.  Feb 2024 Bronchitis.  Discharged last week from local admission for RSV PNA.       HPI: Generally Nik is doing well.  He has no new complaints or problems and has had a good summer.  He has had no recent viral infections.  His GVHD remains stable and he has few signs or symptoms.  He continues to take prednisone 10 mg alternating with 15 mg every other day.  He is on no other immune suppression.  He does have fatigue, wears easily, but his quality life generally is pretty good      Review of Systems                                                                                                                                         10 point Review of systems was negative except as detailed above     PHYSICAL EXAM                                                                                                                                                   KPS: 70    Wt Readings from Last 4 Encounters:   08/05/24 117.8 kg (259 lb 9.6 oz)   07/09/24 118.1 kg (260 lb 4.8 oz)   05/09/24 117 kg (258 lb)   04/02/24 116 kg (255 lb 12.8 oz)      General: NAD.  HEENT: sclera anicteric, injected conjunctivae.    11/14/2023 No noted lichenoid changes in the oral mucosa previous prominent area of healed ulcers in the right/left mid buccal mucosa are less prominent with overall improved appearence and more normal appearing mucosal tissue, no ulcers seen.    1/9/2024 mild lichenoid changes and healing ulcers on both buccal mucosa no open discolored lesions noted  2/27/2024  no lichenoid changes, healing ulcers/minimal scaring on both buccal mucosa  4/2/2024  no lichenoid changes, healing ulcers/minimal scaring on both buccal mucosa  Lungs:  CTA b/l  no wheezes  CV: RRR  no gallop  Abd; soft, NABS,  protuberant  Ext/ Skin: Skin bruising on bilateral forearms,  fainting of previous hyperpigmented areas of previously known cGVHD  LE trace bilateral edema in lower extremities- wearing compression socks    cGVHD therapy started on February 24 2022  - Baseline NIH scoring 2/24/2022 : skin 2 maculopapular rash/erythema with no sclerotic features, mouth score 1 mild symptoms with lichenoid changes less than 25%, eyes score 2 moderate symptoms without new visual impairments due to KCS requiring lubricant eyedrops more than 3 times a day, overall mild scale for her provider  - 3/25/2022 1 month NIH treatment assessment on PQRST: skin 2, mouth score 1 mild symptoms with NO lichenoid changes, eyes score 2 moderate symptoms w requiring lubricant eyedrops more than 3 times a day, liver score 1 ALT 3.7x ULN and nl bilirubin   - 3/31  Mouth clear; eyes minimal LFT still abn; no joint or new GI sx  - 4/28 Mouth clear, Left>R eye is mild sclera erythema, lids are pink and irritated appearing, LFT's slow improvement, no new joint, skin, or GI symptoms.   -5/11 mouth with mild erythema but no lichenoid changes, eyes using eyedrops 4-6 times a day score of 2, LFTs significantly improved from baseline slight elevation in alk phos and AST with normal bilirubin, skin with hyperpigmentation from old acute GVHD no active skin rashes, no GI symptoms no musculoskeletal complaints.  -5/26 Mouth with very slight lichenoid changes, erythema.  Eyes still using eyedrops 4-6x per day.  LFTs essentially normal with slight elevation in alk phos.  Skin with hyperpigmentation from old acute GVHD, no active rashes, no GI or MSK concerns.  Skin 0, mouth 0 but with lichenoid changes, eyes 2  -6/7/22 Mouth with no lichenoid changes, erythema.  Eyes still using eyedrops 4-6x per day.  LFTs essentially normal with slight elevation in alk phos.  Skin with hyperpigmentation from old acute GVHD, no active rashes, no GI or MSK concerns.  Skin 0, mouth 0,  eyes 2     -6 month PQRST assessment 9/6/2022: eyes 3, mouth 0 for symptoms, 25% lichenoid changes (1), liver/GI/skin 0    11/8/2022, 11/22/2022, 12/13/22 cGVHD NIG scoring eyes 3, no other organ involvement clinically  3/28/23 cGVHD NIG scoring eyes 3, no other organ involvement clinically  5/2/23 cGVHD NIG scoring eyes 3, oral 1, no other organ involvement clinically  6/13/23 cGVHD NIG scoring eyes 3, oral 1, no other organ involvement clinically  8/15/23 cGVHD NIG scoring eyes 3 (down to 3-4 times eye drop from >10 but still using scleral lenses), oral 1, no other organ involvement clinically  10/10/2023 cGVHD NIG scoring eyes 3 (down to 3-4 times eye drop from >10 but still using scleral lenses), oral 1, no other organ involvement clinically  11/14/2023 cGVHD NIG scoring eyes 3 (down to 3-4 times eye drop from >10 but still using scleral lenses), oral 1, no other organ involvement clinically  1/9/2024 cGVHD NIG scoring eyes 3 (down to 3-4 times eye drop from >10 but still using scleral lenses), oral 2, no other organ involvement clinically  2/27/2024 cGVHD NIG scoring eyes 3 (down to 3-4 times eye drop from >10 but still using scleral lenses), oral 1, no other organ involvement clinically  4/22024 cGVHD NIG scoring eyes 3 (down to 2 times eye drop from >10 but still using scleral lenses), oral 1, no other organ involvement clinically  8/5/2024 cGVHD NIH scoring eyes 2 (down to 2 times eye drop from >10 but still using scleral lenses), oral 0, no other organ involvement clinically    Lab:    Lab Results   Component Value Date    WBC 11.5 (H) 07/09/2024    ANEU 3.5 10/26/2021    HGB 11.8 (L) 07/09/2024    HCT 34.9 (L) 07/09/2024     07/09/2024     07/09/2024    POTASSIUM 4.2 07/09/2024    CHLORIDE 104 07/09/2024    CO2 24 07/09/2024     (H) 07/09/2024    BUN 35.0 (H) 07/09/2024    CR 1.32 (H) 07/09/2024    MAG 2.0 11/14/2023    INR 0.90 12/01/2022    BILITOTAL 0.6 05/09/2024    AST 33  05/09/2024    ALT 30 05/09/2024    ALKPHOS 77 05/09/2024    PROTTOTAL 6.8 05/09/2024    ALBUMIN 4.3 07/09/2024       ASSESSMENT AND PLAN   Nik Nava is a 64 year old male with Ph Pos ALL, day 1091 s/p sib allo stem cell transplant    Day -6 (8/4): flu/cy  Day -5 through day -2 (8/5-8/8): flu  Day -1 (8/9): TBI  Day 0 (8/10): transplant     1.  Acute lymphoblastic leukemia, [place] chromosome positive in CR, MRD neg. S/p allo sib PBSCT. (ABO matched)  HCT-CI score: 3 (prior solid tumor)  Day +100 bone marrow biopsy is 100% donor with no morphologic or flow cytometric evidence of leukemia BCR abl is pending.  - Day +180 restaging BM bx- still in CR  100% donor;  2/16/22 BCR ABL major breakpoint undetectable.   - 1 year restaging 8/18/2022: Markedly hypocellular bone marrow [less than 10% cellular] with maturing trilineage hematopoiesis and no definite morphologic or immunophenotypic evidence for involvement by B lymphoblastic leukemia. BCRABL1 PCR not detected, bone marrow % donor. FISH NORMAL No evidence of BCR-ABL1 fusion  - Maintenance with TKI posttransplantation given his Ph positive ALL that have not been started previously presumed secondary to multiple active issues specifically infections and COVID.  Per Avila Tobar: will reconsider this patient will think about whether this is something he would like to consider he was previously on Dasatinib, we would start on a low dose and increase as tolerated.  This is on hold now pending active GVHD therapy, we discussed on this visit 10/18 in agreement with holding off.  - 2 year restaging 8/7/2023 BMB: - No morphologic or immunophenotypic evidence of recurrent/persistent B-lymphoblastic leukemia. Slightly hypocellular marrow (10-20% estimated cellularity, patchy and variable), with trilineage hematopoetiic maturation and less than 2% blasts. Peripheral blood showing slight normocytic normochromic anemia and slight thrombocytopenia. BM % donor.  FISH No evidence of BCR::ABL1 fusion /CG No recipient (male) cells  or cells with a t(9;22) or other clonal chromosomal abnormality were  detected among the 20 metaphases analyzed.      2.  HEME: CBC stable.   3/2023 iron low 28, iron saturation index 10%, transferrin 225, ferritin 1381 down trending (in retrospect that was the time of epidural abscess as well).  B12, folate normal.  High copper and zinc borderline low, 5/2023 started zinc.  Started iron replacement in 4/2023, recheck iron profile on follow up more consistent with AOCD, Iron 11/14/23 WNL. Note ferritin elevation is in the setting of discitis/OM, 4/2 down to 1500 but recovering from RSV with nl iron profile, low threshold to obtian ferriscan to objectively assess for iron overload.     3.  FEN/Renal: Creatinine 0.97, s/p nephrectormy, nephrology following     4.    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   8/5/24: Prednisone 15/10    #Skin GVHD- history of biopsy proven GVHD of the skin 8/30/2021; resolved.   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 WNL today   # Ocular GVHD: follows with ophthalmology. Maxitrol to lids, continue refreshing drops QID. Score 3, but down to 2 times eye drops from >10/day, using scleral lenses  Followed closely by Dr. Juarez with significant improvement with scleral lenses and eyedrops  # Oral GVHD: dex rarely.  Lichenoid changes have largely resolved with no open ulcers (December 2023 for flare of oral GVHD in the setting of oral herpes reactivation)    Previous therapies  Tacrolimus-previously decided to stay on same dose, no checks of levels if clinically stable, and no need switch to sirolimus. (keep tac low as gvhd is quiet and viremia). 5/10 level 45 with starting of COVID therapy and D-D intractions (Paxlovid for COVID19, which has a drug interaction with tacrolimus-Ritonavir increases serum levels of tacrolimus).   Tacrolimus level  subtherapeutic, increase to 2.5 mg twice daily recently, 8/23/2022 instructed to change tacrolimus to 2 mg twice daily. 9/6 with mild NEGRA, hyperkalemia, hypomagnesemia, and reports some tremors and cramps, suspect tacrolimus to be high therefore empirically decreased to 1.5 twice daily, skip morning dose of tacrolimus and took 2 mg today after his afternoon labs. Decrease to 1mg BID on Saturday.  Sirolimus  9/21 despite fluids and a dose adjustment of tacrolimus patient seem to have persistence NORBERTO related NEGRA, therefore after discussion with the patient decision was made to transition to sirolimus that was started on 9/21, patient initially seem to tolerate this well with normalization of kidney function  10/11 presented with flares of ocular and oral GVHD, fluid retention and gain of 5-6 kg, lower extremity edema, abdominal distention, total protein to creatinine ratio elevated at 0.4, in addition to elevation in BUN and creatinine, HTN.  Picture most consistent with sirolimus related side effects.  Less likely that the patient will tolerate even a lower level of sirolimus.  Therefore the patient was instructed to stop sirolimus, last dose on 10/10. 10/14 level 3.5 appropriately trending down.     Corticosteroids  3/1-3/16: prednisone 100mg every day  3/17 75mg every day   3/31 75 alt with 50  4/6: 75 alt with 25mg every other day  4/12 pred tapered to 60 alternating with 25mg   4/15 In setting of viruses trying to reactivate,GVHD stable: Taper 60/15mg alternating.  4/20 decrease pred further to 50/10.    4/25 taper pred to 50/0 every other day as long as symptoms stable.   5/2 Pred decrease 40/0 alternating every other day.   5/13 decrease to 30/0  5/20 decrease to 20/0 then slowly by 5 mg weekly  6/7 decrease to 10/0  Went up to 15/0 with mild increased LFTs  8/23/2022 increased to 20/0, with persistence of oral ulcers and active ocular GVHD.  Discussed with the patient the importance of stopping chewing of  nicotine gums for prolonged hours as that would not help with the healing of the oral ulcers.  I discussed with the patient alternatives of nicotine patches that he will reconsider and let me know.  9/6 decreased to 15 alternating with 0  9/13 decreased to 10 alternating with 0  9/21 discontinued tacrolimus given persistent NEGRA and single kidney and start the patient on sirolimus without loading dose.  We will get a list of possible side effects and adverse events from the Pharm.D. see sirolimus section above.  10/11 GVHD flare. Sirolimus stopped. Resume prednisone 40 mg daily as of 10/12, no change with mildly worsening eye pain 10/14  Reduce pred to 35mg 10/25 and 25mg 11/1 11/8 20 mg   11/22 15 mg  12/13/22 10 mg (plan to taper to 5mg then slow taper 1 mg per month given long pred history)  2/23/23 lower from 5 mg to 4 mg for the next month  3/28/2023 - 5/2/2023- 8/15/2023 continue on 10/4 given adrenal insufficieny with recent am cortisol check and clinical symptoms on taper to lower dose of 4 mg daily  -12/2023 had oral GVHD flare started on Prednisone 40 mg,  start taper to 40 and 30 mg alternating for 1 week then 30 mg daily for 1 week, then 30 and 20 alternating for 1 week, then 20 for 1 week till he sees me.  - 2/27/2024 decrease from 10/20mg prednisone to 10/15 then 10/10 -flared and now on 15mg with   - 4/2/2024  15mg  - 5/9/24 15/10  - 8/5/24 10 daily    Belumosudil  Patient intolerant/with disease flares on tacrolimus and sirolimus see above.  Discussed with the patient the different options for therapy of chronic GVHD that are FDA approved.  Given the side effect profiles after discussing in detail and given the least nephrotoxicity and expected response, taking into account other metabolic effect as well.  We will proceed with prior authorization with belumosudil.  Patient was given material to review for possible expected adverse events and side effects with both Belumosodil and ruxolitinib.   ---  Started on 10/18/2022 - skin/liver/oral GVHD inactive, and occular symptoms controlled/symptomatic improvement.   --- 10/10/2023 will hold belumosodil given recurrent infections ans significant improvement in symptoms with no end organ damage after discussions with him and his wife. Will optimize topical treatment with scleral lenses, eyedrops ans dex s/sp int he interim to avoid flares. Will readdress need to restart systemic treatment pending infectious resolution and symptoms.      5.  ID:   # Recent history of Epidural abscess: Followed by Dr. Candelario ID, plan to be on IV ceftriaxone for at least 6 weeks course through 5/11/2023-to be determined on further follow-up.  See imaging with MRI follow-up as above.     #Recurrent discitis/OM still on treatment through October 2023, cx negative, managed with ID locally.  3/30/2023 blood culture with Staph epidermidis  3/31/2023 BONE, L5, FLUOROSCOPICALLY-GUIDED NEEDLE BIOPSY: Benign bone with trilineage hematopoiesis (normal) Negative for neoplasm Negative for acute osteomyelitis. DISC, L5-S1, ASPIRATION FOR CYTOLOGY: Acellular debris; no cellular material present for evaluation     #History of COVID x2 last 1/2023 and influenza    Treated with Paxlovid with persistent fatigue but no active respiratory symptoms  received his influenza annual vaccine as well as COVID boosters locally 11/16/2022     #History of pulmonary infiltrates:   4/20 CT chest with new RUL infiltrate. Highly immunocompromised. 4/22 Fungitell/asp GM were neg. BAL 4/29 NGTD, increased micafungin to 150mg IV daily-- f/u 6/1 Dr Garcia CT with mixed results, followed with Dr. Gracia August 2022 with resolution of pulmonary nodules and normalization of LFTs switched patient to posaconazole prophylactic dose and monitor LFTs and any infectious concerns closely. Monitor and low threshold check CT or add back if on higher dose steroids.     #History of CMV viremia:    - CMV viremia up to 1100. 4/15 started  valcyte 900mg PO BID. 4/20 CMV <137, 5/10 ND, decreased to 450mg BID 4/30 - continue 4 weeks as long as CMV <137 till 5/28 + weekly CMV. Switched to acyclovir after completion of therapy 5/28/2022. recheck 3/1/23 ND    # History of Oral herpes  12/2023:HSV oral lesions; PCR positive. Stopped ACV last month and lesions appears soon thereafter. Empirically Rx Valtrex. mild lingering URI sx; RVP + rhino (12/21); supportive cares. (CCT 12/1/23 neg).started on pred 40mg/d.  Completed Valtrex course and put back on acyclovir restarted 1/20/2024    # History of PNA/bronchitis 2/9/2024 sp doxycyline    # History of RSV PNA 3/26/2024, s/p ribavirin    #Hypogammaglobulinemia with recurrent infections: maintain IgG > 400       - PPx:   ---ACV  Patient developed oral herpes reactivation after stopping acyclovir therefore resumed on 1/9/2023  ---Posaconazole (previously on micafungin with LFts abl, hx of pulmonary nodules needign treatment dose). 11/14/2023 discontinue and check CT in 2 months given history completed December 2023 with no concerns for infections or nodular lesions. Monitor and low threshold check CT or add back if on higher dose steroids.  ---Pentamidine, future 4/22/2024  ---On Penicillin 250 bid px     - EBV viremia: 4/20 CAP CT (w/ contrast): No adenopathy. S/p 4/22 and 5/4/22 rituximab 375mg/m2 5/10 ND x2, 3/13/2024     -  - vaccinations: Previously deferred annual vaccines in the setting of GVHD and immunosuppression therapy. 11/14/2023 we will start vaccination series, including Shingrix, COVID-vaccine. 1/9/2023 Shingrix vaccine.  Needs boosters on next follow up given today RSV recovery    6. Endo:   - Hx of graves disease; On synthroid follows with endocrine  - HLD: 11/2022 cholesterol 355, triglycerides 153, -- 11/8 started rosuvastatin 5 mg daily, 11/22 CPK within normal, 5/2/2023 repeat lipid profile cholesterol down to 202, triglycerides 191, LDL now normal at 95.  We will continue same  dose of rosuvastatin and add fish oil 1g BID (on hold). Repeated August with PCP, who added back fish oil    7. CV:   - Hypertension history   - TTE most recently 10/2022, ECHO in January 2024 at [facility]     8. GI:   -Omeprazole for heartburn  -LFTs as above, now normal. Off ursodiol     9. Psych:   - Situational anxiety - lexapro 10mg daily.   - Insomnia: worse on steroids. Ativan, trazadone, melatonin     10. MSK:   -History of left food drop, PT. Occasional muscle cramps discussed optimizing vitamin D levels and considering vitamin D, some of the symptomatology of muscle cramps are likely related to chronic GVHD.  No muscle cramps reported today.  -4/2023 new tearing of the right acetabular labrum anterosuperiorly referred to to orthopedic surgery.       11. HCM/Age appropriate cancer screening:  -Discussed with the patient importance of age-appropriate cancer screening including colonoscopies, he is due for this.  -Discussed importance of at least once a year vitamin D level check, 8/18/2022 wnl  -Screening for secondary iron overload, see heme section above  -Yearly TSH 2022 wnl; yearly lipid panel - see GI section above  -9/6/2022 DEXA scan Based on BMD diagnosis is consistent with normal bone density based on WHO criteria Ref. 1   -PFTs 9/20/22, see above. 9/21/2023 PFTs The FVC, FEV1, FEV1/FVC ratio and KMG29-04% are within normal limits. FVC 99% (108), FEV1 91%, DLCO uncorrected 91%.  Repeat PFTs in 4 to 6 months.  -Metabolic other, 8/2022 testosterone within normal  -11/22/2022 discussed with the patient the challenges and the stresses of chronic illness and healthcare fatigue.  Patient had previously been on Lexapro that we restarted confirmed with pharmacy that there are no drug drug interactions.  Additionally we will referred patient to PMNR to help with physical rehab and strength to help with fatigue.  Our social workers will also reach out to Nik and his wife for further resources including support  groups for patients with chronic illness and fatigue post transplant.  -Referral to palliative care for symptom and pain management including insomnia     Summary: I recommended that he reconsider a sleep evaluation because of the potential effect of sleep apnea on sleep quality and the resulting effect on daytime sleepiness and fatigue.  He is open to the idea although somewhat resistant to using the device.  I pointed out that there may be new devices or other ways to make this more tolerable.  He had difficulty with prednisone doses lower than 15 mg and we agreed to continue the current dosing.    Plan:  - decrease prednisone to 10 mg daily  - IVIG if IgG lower than 400  - continue follow up for endo, neph, PCP      The longitudinal plan of care for the diagnosis(es)/condition(s) as documented were addressed during this visit. Due to the added complexity in care, I will continue to support Nik in the subsequent management and with ongoing continuity of care.      30 minutes spent on the date of the encounter doing chart review, history and exam, lab review, documentation and coordniating care.    DOMINIQUE BRITTON MD  August 5, 2024

## 2024-08-05 NOTE — NURSING NOTE
Chief Complaint   Patient presents with    Oncology Clinic Visit     Acute lymphblastic leukemia     Port Draw     Labs drawn via port access by lab RN       Port blood draw with heparin flush by lab RN. Vitals taken and appointment arrived.    Christine Hathaway RN

## 2024-08-05 NOTE — LETTER
8/5/2024      Nik Nava  [address] [address] [facility] [id number]      Dear Colleague,    Thank you for referring your patient, Nik Nava, to the [facility]LOOD AND MARROW TRANSPLANT PROGRAM [place]. Please see a copy of my visit note below.      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 Recent oral HSV and oral cGVHD flare treated with prednisone.  Feb 2024 Bronchitis.  Discharged last week from local admission for RSV PNA.       HPI: Generally Nik is doing well.  He has no new complaints or problems and has had a good summer.  He has had no recent viral infections.  His GVHD remains stable and he has few signs or symptoms.  He continues to take prednisone 10 mg alternating with 15 mg every other day.  He is on no other immune suppression.  He does have fatigue, wears easily, but his quality life generally is pretty good      Review of Systems                                                                                                                                         10 point Review of systems was negative except as detailed above     PHYSICAL EXAM                                                                                                                                                   KPS: 70    Wt Readings from Last 4 Encounters:   08/05/24 117.8 kg (259 lb 9.6 oz)   07/09/24 118.1 kg (260 lb 4.8 oz)   05/09/24 117 kg (258 lb)   04/02/24 116 kg (255 lb 12.8 oz)      General: NAD.  HEENT: sclera anicteric, injected conjunctivae.    11/14/2023 No noted lichenoid changes in the oral mucosa previous prominent area of healed ulcers in the right/left mid buccal mucosa are less prominent with overall improved appearence and more normal appearing mucosal tissue, no ulcers seen.    1/9/2024 mild lichenoid changes and healing ulcers on both buccal mucosa no open  discolored lesions noted  2/27/2024  no lichenoid changes, healing ulcers/minimal scaring on both buccal mucosa  4/2/2024  no lichenoid changes, healing ulcers/minimal scaring on both buccal mucosa  Lungs:  CTA b/l  no wheezes  CV: RRR  no gallop  Abd; soft, NABS, protuberant  Ext/ Skin: Skin bruising on bilateral forearms,  fainting of previous hyperpigmented areas of previously known cGVHD  LE trace bilateral edema in lower extremities- wearing compression socks    cGVHD therapy started on February 24 2022  - Baseline NIH scoring 2/24/2022 : skin 2 maculopapular rash/erythema with no sclerotic features, mouth score 1 mild symptoms with lichenoid changes less than 25%, eyes score 2 moderate symptoms without new visual impairments due to KCS requiring lubricant eyedrops more than 3 times a day, overall mild scale for her provider  - 3/25/2022 1 month NIH treatment assessment on PQRST: skin 2, mouth score 1 mild symptoms with NO lichenoid changes, eyes score 2 moderate symptoms w requiring lubricant eyedrops more than 3 times a day, liver score 1 ALT 3.7x ULN and nl bilirubin   - 3/31  Mouth clear; eyes minimal LFT still abn; no joint or new GI sx  - 4/28 Mouth clear, Left>R eye is mild sclera erythema, lids are pink and irritated appearing, LFT's slow improvement, no new joint, skin, or GI symptoms.   -5/11 mouth with mild erythema but no lichenoid changes, eyes using eyedrops 4-6 times a day score of 2, LFTs significantly improved from baseline slight elevation in alk phos and AST with normal bilirubin, skin with hyperpigmentation from old acute GVHD no active skin rashes, no GI symptoms no musculoskeletal complaints.  -5/26 Mouth with very slight lichenoid changes, erythema.  Eyes still using eyedrops 4-6x per day.  LFTs essentially normal with slight elevation in alk phos.  Skin with hyperpigmentation from old acute GVHD, no active rashes, no GI or MSK concerns.  Skin 0, mouth 0 but with lichenoid changes, eyes  2  -6/7/22 Mouth with no lichenoid changes, erythema.  Eyes still using eyedrops 4-6x per day.  LFTs essentially normal with slight elevation in alk phos.  Skin with hyperpigmentation from old acute GVHD, no active rashes, no GI or MSK concerns.  Skin 0, mouth 0, eyes 2     -6 month PQRST assessment 9/6/2022: eyes 3, mouth 0 for symptoms, 25% lichenoid changes (1), liver/GI/skin 0    11/8/2022, 11/22/2022, 12/13/22 cGVHD NIG scoring eyes 3, no other organ involvement clinically  3/28/23 cGVHD NIG scoring eyes 3, no other organ involvement clinically  5/2/23 cGVHD NIG scoring eyes 3, oral 1, no other organ involvement clinically  6/13/23 cGVHD NIG scoring eyes 3, oral 1, no other organ involvement clinically  8/15/23 cGVHD NIG scoring eyes 3 (down to 3-4 times eye drop from >10 but still using scleral lenses), oral 1, no other organ involvement clinically  10/10/2023 cGVHD NIG scoring eyes 3 (down to 3-4 times eye drop from >10 but still using scleral lenses), oral 1, no other organ involvement clinically  11/14/2023 cGVHD NIG scoring eyes 3 (down to 3-4 times eye drop from >10 but still using scleral lenses), oral 1, no other organ involvement clinically  1/9/2024 cGVHD NIG scoring eyes 3 (down to 3-4 times eye drop from >10 but still using scleral lenses), oral 2, no other organ involvement clinically  2/27/2024 cGVHD NIG scoring eyes 3 (down to 3-4 times eye drop from >10 but still using scleral lenses), oral 1, no other organ involvement clinically  4/22024 cGVHD NIG scoring eyes 3 (down to 2 times eye drop from >10 but still using scleral lenses), oral 1, no other organ involvement clinically  8/5/2024 cGVHD NIH scoring eyes 2 (down to 2 times eye drop from >10 but still using scleral lenses), oral 0, no other organ involvement clinically    Lab:    Lab Results   Component Value Date    WBC 11.5 (H) 07/09/2024    ANEU 3.5 10/26/2021    HGB 11.8 (L) 07/09/2024    HCT 34.9 (L) 07/09/2024     07/09/2024      07/09/2024    POTASSIUM 4.2 07/09/2024    CHLORIDE 104 07/09/2024    CO2 24 07/09/2024     (H) 07/09/2024    BUN 35.0 (H) 07/09/2024    CR 1.32 (H) 07/09/2024    MAG 2.0 11/14/2023    INR 0.90 12/01/2022    BILITOTAL 0.6 05/09/2024    AST 33 05/09/2024    ALT 30 05/09/2024    ALKPHOS 77 05/09/2024    PROTTOTAL 6.8 05/09/2024    ALBUMIN 4.3 07/09/2024       ASSESSMENT AND PLAN   Nik Nava is a 64 year old male with Ph Pos ALL, day 1091 s/p sib allo stem cell transplant    Day -6 (8/4): flu/cy  Day -5 through day -2 (8/5-8/8): flu  Day -1 (8/9): TBI  Day 0 (8/10): transplant     1.  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 S/p allo sib PBSCT. (ABO matched)  HCT-CI score: 3 (prior solid tumor)  Day +100 bone marrow biopsy is 100% donor with no morphologic or flow cytometric evidence of leukemia BCR abl is pending.  - Day +180 restaging BM bx- still in CR  100% donor;  2/16/22 BCR ABL major breakpoint undetectable.   - 1 year restaging 8/18/2022: Markedly hypocellular bone marrow [less than 10% cellular] with maturing trilineage hematopoiesis and no definite morphologic or immunophenotypic evidence for involvement by B lymphoblastic leukemia. BCRABL1 PCR not detected, bone marrow % donor. FISH NORMAL No evidence of BCR-ABL1 fusion  - Maintenance with TKI posttransplantation given his Ph positive ALL that have not been started previously presumed secondary to multiple active issues specifically infections and COVID.  Per Avila Tobar: will reconsider this patient will think about whether this is something he would like to consider he was previously on Dasatinib, we would start on a low dose and increase as tolerated.  This is on hold now pending active GVHD therapy, we discussed on this visit 10/18 in agreement with holding off.  - 2 year restaging 8/7/2023 BMB: - No morphologic or immunophenotypic evidence of recurrent/persistent B-lymphoblastic leukemia.  Slightly hypocellular marrow (10-20% estimated cellularity, patchy and variable), with trilineage hematopoetiic maturation and less than 2% blasts. Peripheral blood showing slight normocytic normochromic anemia and slight thrombocytopenia. BM % donor. FISH No evidence of BCR::ABL1 fusion /CG No recipient (male) cells  or cells with a t(9;22) or other clonal chromosomal abnormality were  detected among the 20 metaphases analyzed.      2.  HEME: CBC stable.   3/2023 iron low 28, iron saturation index 10%, transferrin 225, ferritin 1381 down trending (in retrospect that was the time of epidural abscess as well).  B12, folate normal.  High copper and zinc borderline low, 5/2023 started zinc.  Started iron replacement in 4/2023, recheck iron profile on follow up more consistent with AOCD, Iron 11/14/23 WNL. Note ferritin elevation is in the setting of discitis/OM, 4/2 down to 1500 but recovering from RSV with nl iron profile, low threshold to obtian ferriscan to objectively assess for iron overload.     3.  FEN/Renal: Creatinine 0.97, s/p nephrectormy, nephrology following     4.    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   8/5/24: Prednisone 15/10    #Skin GVHD- history of biopsy proven GVHD of the skin 8/30/2021; resolved.   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 WNL today   # Ocular GVHD: follows with ophthalmology. Maxitrol to lids, continue refreshing drops QID. Score 3, but down to 2 times eye drops from >10/day, using scleral lenses  Followed closely by Dr. Juarez with significant improvement with scleral lenses and eyedrops  # Oral GVHD: dex rarely.  Lichenoid changes have largely resolved with no open ulcers (December 2023 for flare of oral GVHD in the setting of oral herpes reactivation)    Previous therapies  Tacrolimus-previously decided to stay on same dose, no checks of levels if clinically stable, and no need switch to  sirolimus. (keep tac low as gvhd is quiet and viremia). 5/10 level 45 with starting of COVID therapy and D-D intractions (Paxlovid for COVID19, which has a drug interaction with tacrolimus-Ritonavir increases serum levels of tacrolimus).   Tacrolimus level subtherapeutic, increase to 2.5 mg twice daily recently, 8/23/2022 instructed to change tacrolimus to 2 mg twice daily. 9/6 with mild NEGRA, hyperkalemia, hypomagnesemia, and reports some tremors and cramps, suspect tacrolimus to be high therefore empirically decreased to 1.5 twice daily, skip morning dose of tacrolimus and took 2 mg today after his afternoon labs. Decrease to 1mg BID on Saturday.  Sirolimus  9/21 despite fluids and a dose adjustment of tacrolimus patient seem to have persistence NORBERTO related NEGRA, therefore after discussion with the patient decision was made to transition to sirolimus that was started on 9/21, patient initially seem to tolerate this well with normalization of kidney function  10/11 presented with flares of ocular and oral GVHD, fluid retention and gain of 5-6 kg, lower extremity edema, abdominal distention, total protein to creatinine ratio elevated at 0.4, in addition to elevation in BUN and creatinine, HTN.  Picture most consistent with sirolimus related side effects.  Less likely that the patient will tolerate even a lower level of sirolimus.  Therefore the patient was instructed to stop sirolimus, last dose on 10/10. 10/14 level 3.5 appropriately trending down.     Corticosteroids  3/1-3/16: prednisone 100mg every day  3/17 75mg every day   3/31 75 alt with 50  4/6: 75 alt with 25mg every other day  4/12 pred tapered to 60 alternating with 25mg   4/15 In setting of viruses trying to reactivate,GVHD stable: Taper 60/15mg alternating.  4/20 decrease pred further to 50/10.    4/25 taper pred to 50/0 every other day as long as symptoms stable.   5/2 Pred decrease 40/0 alternating every other day.   5/13 decrease to 30/0  5/20  decrease to 20/0 then slowly by 5 mg weekly  6/7 decrease to 10/0  Went up to 15/0 with mild increased LFTs  8/23/2022 increased to 20/0, with persistence of oral ulcers and active ocular GVHD.  Discussed with the patient the importance of stopping chewing of nicotine gums for prolonged hours as that would not help with the healing of the oral ulcers.  I discussed with the patient alternatives of nicotine patches that he will reconsider and let me know.  9/6 decreased to 15 alternating with 0  9/13 decreased to 10 alternating with 0  9/21 discontinued tacrolimus given persistent NEGRA and single kidney and start the patient on sirolimus without loading dose.  We will get a list of possible side effects and adverse events from the Pharm.D. see sirolimus section above.  10/11 GVHD flare. Sirolimus stopped. Resume prednisone 40 mg daily as of 10/12, no change with mildly worsening eye pain 10/14  Reduce pred to 35mg 10/25 and 25mg 11/1 11/8 20 mg   11/22 15 mg  12/13/22 10 mg (plan to taper to 5mg then slow taper 1 mg per month given long pred history)  2/23/23 lower from 5 mg to 4 mg for the next month  3/28/2023 - 5/2/2023- 8/15/2023 continue on 10/4 given adrenal insufficieny with recent am cortisol check and clinical symptoms on taper to lower dose of 4 mg daily  -12/2023 had oral GVHD flare started on Prednisone 40 mg,  start taper to 40 and 30 mg alternating for 1 week then 30 mg daily for 1 week, then 30 and 20 alternating for 1 week, then 20 for 1 week till he sees me.  - 2/27/2024 decrease from 10/20mg prednisone to 10/15 then 10/10 -flared and now on 15mg with   - 4/2/2024  15mg  - 5/9/24 15/10  - 8/5/24 10 daily    Belumosudil  Patient intolerant/with disease flares on tacrolimus and sirolimus see above.  Discussed with the patient the different options for therapy of chronic GVHD that are FDA approved.  Given the side effect profiles after discussing in detail and given the least nephrotoxicity and expected  response, taking into account other metabolic effect as well.  We will proceed with prior authorization with belumosudil.  Patient was given material to review for possible expected adverse events and side effects with both Belumosodil and ruxolitinib.   --- Started on 10/18/2022 - skin/liver/oral GVHD inactive, and occular symptoms controlled/symptomatic improvement.   --- 10/10/2023 will hold belumosodil given recurrent infections ans significant improvement in symptoms with no end organ damage after discussions with him and his wife. Will optimize topical treatment with scleral lenses, eyedrops ans dex s/sp int he interim to avoid flares. Will readdress need to restart systemic treatment pending infectious resolution and symptoms.      5.  ID:   # Recent history of Epidural abscess: Followed by Dr. Candelario ID, plan to be on IV ceftriaxone for at least 6 weeks course through 5/11/2023-to be determined on further follow-up.  See imaging with MRI follow-up as above.     #Recurrent discitis/OM still on treatment through October 2023, cx negative, managed with ID locally.  3/30/2023 blood culture with Staph epidermidis  3/31/2023 BONE, L5, FLUOROSCOPICALLY-GUIDED NEEDLE BIOPSY: Benign bone with trilineage hematopoiesis (normal) Negative for neoplasm Negative for acute osteomyelitis. DISC, L5-S1, ASPIRATION FOR CYTOLOGY: Acellular debris; no cellular material present for evaluation     #History of COVID x2 last 1/2023 and influenza    Treated with Paxlovid with persistent fatigue but no active respiratory symptoms  received his influenza annual vaccine as well as COVID boosters locally 11/16/2022     #History of pulmonary infiltrates:   4/20 CT chest with new RUL infiltrate. Highly immunocompromised. 4/22 Fungitell/asp GM were neg. BAL 4/29 NGTD, increased micafungin to 150mg IV daily-- f/u 6/1 Dr Garcia CT with mixed results, followed with Dr. Garcia August 2022 with resolution of pulmonary nodules and normalization of  LFTs switched patient to posaconazole prophylactic dose and monitor LFTs and any infectious concerns closely. Monitor and low threshold check CT or add back if on higher dose steroids.     #History of CMV viremia:    - CMV viremia up to 1100. 4/15 started valcyte 900mg PO BID. 4/20 CMV <137, 5/10 ND, decreased to 450mg BID 4/30 - continue 4 weeks as long as CMV <137 till 5/28 + weekly CMV. Switched to acyclovir after completion of therapy 5/28/2022. recheck 3/1/23 ND    # History of Oral herpes  12/2023:HSV oral lesions; PCR positive. Stopped ACV last month and lesions appears soon thereafter. Empirically Rx Valtrex. mild lingering URI sx; RVP + rhino (12/21); supportive cares. (CCT 12/1/23 neg).started on pred 40mg/d.  Completed Valtrex course and put back on acyclovir restarted 1/20/2024    # History of PNA/bronchitis 2/9/2024 sp doxycyline    # History of RSV PNA 3/26/2024, s/p ribavirin    #Hypogammaglobulinemia with recurrent infections: maintain IgG > 400       - PPx:   ---ACV  Patient developed oral herpes reactivation after stopping acyclovir therefore resumed on 1/9/2023  ---Posaconazole (previously on micafungin with LFts abl, hx of pulmonary nodules needign treatment dose). 11/14/2023 discontinue and check CT in 2 months given history completed December 2023 with no concerns for infections or nodular lesions. Monitor and low threshold check CT or add back if on higher dose steroids.  ---Pentamidine, future 4/22/2024  ---On Penicillin 250 bid px     - EBV viremia: 4/20 CAP CT (w/ contrast): No adenopathy. S/p 4/22 and 5/4/22 rituximab 375mg/m2 5/10 ND x2, 3/13/2024     -  - vaccinations: Previously deferred annual vaccines in the setting of GVHD and immunosuppression therapy. 11/14/2023 we will start vaccination series, including Shingrix, COVID-vaccine. 1/9/2023 Shingrix vaccine.  Needs boosters on next follow up given today RSV recovery    6. Endo:   - Hx of graves disease; On synthroid follows  with endocrine  - HLD: 11/2022 cholesterol 355, triglycerides 153, -- 11/8 started rosuvastatin 5 mg daily, 11/22 CPK within normal, 5/2/2023 repeat lipid profile cholesterol down to 202, triglycerides 191, LDL now normal at 95.  We will continue same dose of rosuvastatin and add fish oil 1g BID (on hold). Repeated August with PCP, who added back fish oil    7. CV:   - Hypertension history   - TTE most recently 10/2022, ECHO in January 2024 at [facility]     8. GI:   -Omeprazole for heartburn  -LFTs as above, now normal. Off ursodiol     9. Psych:   - Situational anxiety - lexapro 10mg daily.   - Insomnia: worse on steroids. Ativan, trazadone, melatonin     10. MSK:   -History of left food drop, PT. Occasional muscle cramps discussed optimizing vitamin D levels and considering vitamin D, some of the symptomatology of muscle cramps are likely related to chronic GVHD.  No muscle cramps reported today.  -4/2023 new tearing of the right acetabular labrum anterosuperiorly referred to to orthopedic surgery.       11. HCM/Age appropriate cancer screening:  -Discussed with the patient importance of age-appropriate cancer screening including colonoscopies, he is due for this.  -Discussed importance of at least once a year vitamin D level check, 8/18/2022 wnl  -Screening for secondary iron overload, see heme section above  -Yearly TSH 2022 wnl; yearly lipid panel - see GI section above  -9/6/2022 DEXA scan Based on BMD diagnosis is consistent with normal bone density based on WHO criteria Ref. 1   -PFTs 9/20/22, see above. 9/21/2023 PFTs The FVC, FEV1, FEV1/FVC ratio and [id number]% are within normal limits. FVC 99% (108), FEV1 91%, DLCO uncorrected 91%.  Repeat PFTs in 4 to 6 months.  -Metabolic other, 8/2022 testosterone within normal  -11/22/2022 discussed with the patient the challenges and the stresses of chronic illness and healthcare fatigue.  Patient had previously been on Lexapro that we restarted confirmed with  pharmacy that there are no drug drug interactions.  Additionally we will referred patient to PMNR to help with physical rehab and strength to help with fatigue.  Our social workers will also reach out to Nik and his wife for further resources including support groups for patients with chronic illness and fatigue post transplant.  -Referral to palliative care for symptom and pain management including insomnia     Summary: I recommended that he reconsider a sleep evaluation because of the potential effect of sleep apnea on sleep quality and the resulting effect on daytime sleepiness and fatigue.  He is open to the idea although somewhat resistant to using the device.  I pointed out that there may be new devices or other ways to make this more tolerable.  He had difficulty with prednisone doses lower than 15 mg and we agreed to continue the current dosing.    Plan:  - decrease prednisone to 10 mg daily  - IVIG if IgG lower than 400  - continue follow up for endo, neph, PCP      The longitudinal plan of care for the diagnosis(es)/condition(s) as documented were addressed during this visit. Due to the added complexity in care, I will continue to support Nik in the subsequent management and with ongoing continuity of care.      30 minutes spent on the date of the encounter doing chart review, history and exam, lab review, documentation and coordniating care.    DOMINIQUE BRITTON MD  August 5, 2024                                                  Again, thank you for allowing me to participate in the care of your patient.        Sincerely,        DOMINIQUE BRITTON MD

## 2024-08-05 NOTE — NURSING NOTE
"Oncology Rooming Note    August 5, 2024 9:48 AM   Nik Nava is a 64 year old male who presents for:    Chief Complaint   Patient presents with    Oncology Clinic Visit     Acute lymphblastic leukemia     Port Draw     Labs drawn via port access by lab RN     Initial Vitals: /85   Pulse 84   Temp 97.5  F (36.4  C) (Oral)   Resp 18   Wt 117.8 kg (259 lb 9.6 oz)   SpO2 98%   BMI 34.26 kg/m   Estimated body mass index is 34.26 kg/m  as calculated from the following:    Height as of 5/9/24: 1.854 m (6' 0.99\").    Weight as of this encounter: 117.8 kg (259 lb 9.6 oz). Body surface area is 2.46 meters squared.  No Pain (0) Comment: Data Unavailable   No LMP for male patient.  Allergies reviewed: Yes  Medications reviewed: Yes    Medications: MEDICATION REFILLS NEEDED TODAY. Provider was notified. Via message column  Pharmacy name entered into [employer]:    [facility] - [place], [place] - [address] [facility] [place], [place] - [address] [place] [address] [phone]  [facility]  [facility] - [place], [place] - [address] [place] [address]    Frailty Screening:   Is the patient here for a new oncology consult visit in cancer care? 2. No      Clinical concerns: If continuing prednisone will need refills    Creatinine was high last visit- wondering how that is progressing. Wondering if going to [facility] would be okay; can he get RSV vaccine; wondering about other vaccinations covid flu    Susanne Mehta              "

## 2024-08-06 ENCOUNTER — DOCUMENTATION ONLY (OUTPATIENT)
Dept: PHARMACY | Facility: CLINIC | Age: 64
End: 2024-08-06
Payer: MEDICARE

## 2024-08-06 LAB
ALBUMIN SERPL BCG-MCNC: 4.1 G/DL (ref 3.5–5.2)
ALP SERPL-CCNC: 78 U/L (ref 40–150)
ALT SERPL W P-5'-P-CCNC: 25 U/L (ref 0–70)
ANION GAP SERPL CALCULATED.3IONS-SCNC: 11 MMOL/L (ref 7–15)
AST SERPL W P-5'-P-CCNC: 32 U/L (ref 0–45)
BILIRUB SERPL-MCNC: 0.5 MG/DL
BUN SERPL-MCNC: 37.7 MG/DL (ref 8–23)
CALCIUM SERPL-MCNC: 9.4 MG/DL (ref 8.8–10.4)
CHLORIDE SERPL-SCNC: 104 MMOL/L (ref 98–107)
CREAT SERPL-MCNC: 1.21 MG/DL (ref 0.67–1.17)
EGFRCR SERPLBLD CKD-EPI 2021: 66 ML/MIN/1.73M2
ERYTHROCYTE [DISTWIDTH] IN BLOOD BY AUTOMATED COUNT: 15.4 % (ref 10–15)
GLUCOSE SERPL-MCNC: 111 MG/DL (ref 70–99)
HCO3 SERPL-SCNC: 24 MMOL/L (ref 22–29)
HCT VFR BLD AUTO: 37 % (ref 40–53)
HGB BLD-MCNC: 12.1 G/DL (ref 13.3–17.7)
MCH RBC QN AUTO: 34.3 PG (ref 26.5–33)
MCHC RBC AUTO-ENTMCNC: 32.7 G/DL (ref 31.5–36.5)
MCV RBC AUTO: 105 FL (ref 78–100)
PLATELET # BLD AUTO: 233 10E3/UL (ref 150–450)
POTASSIUM SERPL-SCNC: 4.1 MMOL/L (ref 3.4–5.3)
PROT SERPL-MCNC: 6.6 G/DL (ref 6.4–8.3)
RBC # BLD AUTO: 3.53 10E6/UL (ref 4.4–5.9)
SODIUM SERPL-SCNC: 139 MMOL/L (ref 135–145)
WBC # BLD AUTO: 8.3 10E3/UL (ref 4–11)

## 2024-08-06 NOTE — PROGRESS NOTES
Pharmacist IVIG Stewardship Program    Diagnosis: Hypogammaglobulinemia   Date  4/12/24   IgG Level (mg/dL) 352     Current Dosing Regimen: Privigen 40g IV every 28 days PRN for IgG <400 mg/dL  Patient is dosed as needed based on an IgG levels of less than 400 mg/dL to ensure the lowest amount of medication is administered to achieve beneficial outcomes.  Pre-medications:   Acetaminophen 650 mg by mouth, 30 minutes prior to infusion  Diphenhydramine 50 mg by mouth, 30 minutes prior to infusion  Hydrocortisone 100 mg IV, 30 minutes prior to infusion  Current Titration Regimen: Start infusion at 0.5 ml/kg/hr x 15 min. If tolerated, increase rate by 0.5 ml/kg/hr every 15 min to a maximum of 4 ml/kg/hr.  Previously Tried Products: None     Side Effects: Unable to reach patient to discuss.     Interventions: Lab review completed. .     Assessment:   Date  8/6/2024   IgG Level (mg/dL) 542   Patient is appropriate for additional IVIG therapy when their level is within parameter. IVIG infusions are effective in increasing IgG levels to an appropriate level to minimize patient's risk of infection. Patient should continue on treatment as they have been per provider order. Without therapy their levels would put them at an increased risk of infections.    Plan: Patient is not okay to proceed with infusions at this time as their level is above their provider ordered parameter. They will need to have labs drawn again next month to assess the need of ongoing infusions moving forward.     Next Review Needed: 9/2024    Genevieve Ramirez, PharmD, IgCP  Medication Access Pharmacist

## 2024-08-26 ENCOUNTER — TELEPHONE (OUTPATIENT)
Dept: OPTOMETRY | Facility: CLINIC | Age: 64
End: 2024-08-26

## 2024-08-26 ENCOUNTER — OFFICE VISIT (OUTPATIENT)
Dept: OPTOMETRY | Facility: CLINIC | Age: 64
End: 2024-08-26
Payer: MEDICARE

## 2024-08-26 DIAGNOSIS — H04.129 DRY EYE: Primary | ICD-10-CM

## 2024-08-26 DIAGNOSIS — H11.32 SUBCONJUNCTIVAL HEMORRHAGE OF LEFT EYE: ICD-10-CM

## 2024-08-26 PROBLEM — R73.03 PREDIABETES: Status: ACTIVE | Noted: 2021-01-11

## 2024-08-26 PROBLEM — G47.00 INSOMNIA: Status: ACTIVE | Noted: 2024-01-30

## 2024-08-26 PROBLEM — I10 HTN (HYPERTENSION): Status: ACTIVE | Noted: 2020-06-29

## 2024-08-26 PROBLEM — E87.1 HYPONATREMIA: Status: ACTIVE | Noted: 2024-08-26

## 2024-08-26 PROBLEM — F11.90 CHRONIC, CONTINUOUS USE OF OPIOIDS: Status: ACTIVE | Noted: 2023-09-03

## 2024-08-26 PROBLEM — G62.9 PERIPHERAL NERVE DISEASE: Status: ACTIVE | Noted: 2024-01-30

## 2024-08-26 PROBLEM — J15.9 BACTERIAL PNEUMONIA: Status: ACTIVE | Noted: 2024-08-26

## 2024-08-26 PROBLEM — K59.00 CONSTIPATION: Status: ACTIVE | Noted: 2024-01-30

## 2024-08-26 PROBLEM — D84.9 IMMUNOCOMPROMISED (H): Status: ACTIVE | Noted: 2024-06-04

## 2024-08-26 PROBLEM — N18.31 ANEMIA IN STAGE 3A CHRONIC KIDNEY DISEASE (H): Status: ACTIVE | Noted: 2024-01-29

## 2024-08-26 PROBLEM — J21.0 RSV (ACUTE BRONCHIOLITIS DUE TO RESPIRATORY SYNCYTIAL VIRUS): Status: ACTIVE | Noted: 2024-03-26

## 2024-08-26 PROBLEM — M46.46 DISCITIS OF LUMBAR REGION: Status: ACTIVE | Noted: 2023-09-05

## 2024-08-26 PROBLEM — Z87.19 HX OF ACUTE PANCREATITIS: Status: ACTIVE | Noted: 2021-03-20

## 2024-08-26 PROBLEM — Z86.711 HISTORY OF PULMONARY EMBOLISM: Status: ACTIVE | Noted: 2021-04-03

## 2024-08-26 PROBLEM — M46.26 OSTEOMYELITIS OF LUMBAR SPINE (H): Status: ACTIVE | Noted: 2024-01-29

## 2024-08-26 PROBLEM — E27.40 UNSPECIFIED ADRENOCORTICAL INSUFFICIENCY (H): Status: ACTIVE | Noted: 2024-06-04

## 2024-08-26 PROBLEM — R60.0 EDEMA OF LOWER EXTREMITY: Status: ACTIVE | Noted: 2024-08-26

## 2024-08-26 PROBLEM — D63.1 ANEMIA IN STAGE 3A CHRONIC KIDNEY DISEASE (H): Status: ACTIVE | Noted: 2024-01-29

## 2024-08-26 PROBLEM — E03.8 OTHER SPECIFIED HYPOTHYROIDISM: Status: ACTIVE | Noted: 2024-08-26

## 2024-08-26 PROBLEM — I26.99 PULMONARY EMBOLISM (H): Status: ACTIVE | Noted: 2024-08-26

## 2024-08-26 RX ORDER — FLUOROMETHOLONE 0.1 %
1 SUSPENSION, DROPS(FINAL DOSAGE FORM)(ML) OPHTHALMIC (EYE) 3 TIMES DAILY
Qty: 5 ML | Refills: 1 | Status: SHIPPED | OUTPATIENT
Start: 2024-08-26

## 2024-08-26 ASSESSMENT — VISUAL ACUITY
OS_SC: 20/20
OD_SC: 20/40
METHOD: SNELLEN - LINEAR
OS_SC+: -1

## 2024-08-26 ASSESSMENT — CONF VISUAL FIELD
OD_SUPERIOR_TEMPORAL_RESTRICTION: 0
OD_INFERIOR_TEMPORAL_RESTRICTION: 0
OD_SUPERIOR_NASAL_RESTRICTION: 0
OS_INFERIOR_NASAL_RESTRICTION: 0
OS_SUPERIOR_TEMPORAL_RESTRICTION: 0
OS_INFERIOR_TEMPORAL_RESTRICTION: 0
OD_NORMAL: 1
OS_NORMAL: 1
OD_INFERIOR_NASAL_RESTRICTION: 0
OS_SUPERIOR_NASAL_RESTRICTION: 0
METHOD: COUNTING FINGERS

## 2024-08-26 ASSESSMENT — SLIT LAMP EXAM - LIDS
COMMENTS: 1+ BLEPH, THICKENED MARGINS, 1-2+ MGD
COMMENTS: 1+ BLEPH, THICKENED MARGINS, 1-2+ MGD

## 2024-08-26 ASSESSMENT — TONOMETRY
OS_IOP_MMHG: 10
IOP_METHOD: ICARE
OD_IOP_MMHG: 09

## 2024-08-26 ASSESSMENT — REFRACTION_CURRENTRX
OS_ADDL_SPECS: OPT EXTRA BLUE, HYDRAPEG
OS_DIAMETER: 14.9
OD_DIAMETER: 14.9
OS_BRAND: ONEFIT
OD_SPHERE: -0.62
OS_SPHERE: +0.50
OD_ADDL_SPECS: OPT EXTRA CLEAR, HYDRAPEG
OD_BRAND: ONEFIT
OS_BASECURVE: 8.2
OD_BASECURVE: 8.1

## 2024-08-26 ASSESSMENT — EXTERNAL EXAM - LEFT EYE: OS_EXAM: DERMATOCHALASIS

## 2024-08-26 ASSESSMENT — EXTERNAL EXAM - RIGHT EYE: OD_EXAM: DERMATOCHALASIS

## 2024-08-26 NOTE — PATIENT INSTRUCTIONS
Start FML (fluorometholone) 3x/day left eye for 1 week.  Then start 2x/day in BOTH eyes - use until she comes back, then drop to 1x/day for one more week in both eyes and stop

## 2024-08-26 NOTE — NURSING NOTE
Chief Complaints and History of Present Illnesses   Patient presents with    Contact Lens Follow Up     Pt here for scleral lens issues left eye.     Chief Complaint(s) and History of Present Illness(es)       Contact Lens Follow Up              Laterality: left eye    Comments: Pt here for scleral lens issues left eye.              Comments    Pt has had left eye redness, pain with scleral lens wear x3-4 days. Painful when removing lens- uncomfortable when in. Pt denies light sensitivity or vision changes. Does struggles to get it out.     Suma Crockett, COT on 8/26/2024 at 1:21 PM

## 2024-08-26 NOTE — TELEPHONE ENCOUNTER
Returned call to patient to discuss symptoms.     Reports over the last 3-4 days he has been having L eye redness. Reports the left eye was painful when removing the scleral contact lens and uncomfortable when having it in. Thinks there may be a sharp edge or issue with the fit.     Patient scheduled today with Dr. Juarez, appointment time 1320, arrival time of 1305. Patient aware of date/time/location information for appointment. Patient confirmed appointment time and all questions were answered.     Aileen Wills RN 10:51 AM 08/26/24

## 2024-08-26 NOTE — TELEPHONE ENCOUNTER
[facility] Call Center    Phone Message    May a detailed message be left on voicemail: yes     Reason for Call: Symptoms or Concerns     If patient has red-flag symptoms, warm transfer to triage line    Current symptom or concern:   per pts wife Lamar pt has a subconjuctival hemotoma, hemorage blood vesel broken, per caller wife Lamar is concerned CL scelaral lens may have a sharp edge on it, has had it for 3 days, per caller the CL was dry and it hurt quite a bit after removing the lens, drops helped    Symptoms have been present for:  3-4 days    Has patient previously been seen for this? No    By : N/A    Date: N/A    Are there any new or worsening symptoms? Yes:     Action Taken: Message routed to:  [facility] eye    Travel Screening: Not Applicable     Date of Service:

## 2024-08-26 NOTE — PROGRESS NOTES
A/P  1.) Dry Eye OU  -s/p BMT 08/2021. Dryness right eye>left eye, symptomatic for photophobia/tearing  -Failed: Restasis/Xiidra (stinging), plugs (scarred puncta), BCL (retention)  -Chronic oral steroid use  -Now excellent response to scleral lens - corneal staining resolved OU. Much improved ocular comfort.   -Wife helping with I&R. Pt on oral steroids causing hands to shake, unable to do himself.    Int Hx:  -Recently doing well in scleral lens. Several days ago got irritated and red.   -New subconj heme with diffuse chemosis. Causing suction/tight fitting scleral lens currently  -Corneas intact without stain. May elect to wear or not wear scleral lens if suction too uncomfortable until chemosis resolves  -FML TID to help with residual swelling.   -Wife is going out of town soon - increase AT and continue steroids during this time    2.) Pseudophakia OU  -Doing well, excellent acuity    Continue with current lenses. Keep October routine check    I have confirmed the patient's CC, HPI and reviewed Past Medical History, Past Surgical History, Social History, Family History, Problem List, Medication List and agree with Tech note.     Aisha Juarez, OD [facility]

## 2024-08-30 ENCOUNTER — MYC MEDICAL ADVICE (OUTPATIENT)
Dept: TRANSPLANT | Facility: CLINIC | Age: 64
End: 2024-08-30
Payer: MEDICARE

## 2024-08-30 DIAGNOSIS — T86.09 GVHD AS COMPLICATION OF BONE MARROW TRANSPLANT (H): ICD-10-CM

## 2024-08-30 DIAGNOSIS — Z94.81 STATUS POST BONE MARROW TRANSPLANT (H): Primary | ICD-10-CM

## 2024-08-30 DIAGNOSIS — D89.813 GVHD AS COMPLICATION OF BONE MARROW TRANSPLANT (H): ICD-10-CM

## 2024-08-30 NOTE — TELEPHONE ENCOUNTER
Nik and Wife called with concerns of Nik developing mouth sores starting 8/26. Nik started using dex mouth wash he had on hand (2x/day). The mouth wash has not helped the mouth sores at this time. Patient and wife are wondering if he should go back up on pred to 15mg (last visit with Dr. Cote, pred was decreased to 10mg). 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       Spoke with BMT APPs for recommendations:   - increase prednisone to 15mg   -use the dex mouth wash 4x/day  -HSV swab next week    Nik is aware and will increase his pred to 15mg/day and dex mouth wash to 4x/day. I will request a nurse visit for next week for a HSV swab and he is aware the schedulers will reach out to him. If the mouth sores get worse over the holiday weekend, Nik will call the [phone] triage line for further direction.

## 2024-09-03 ENCOUNTER — ALLIED HEALTH/NURSE VISIT (OUTPATIENT)
Dept: TRANSPLANT | Facility: CLINIC | Age: 64
End: 2024-09-03
Attending: INTERNAL MEDICINE
Payer: MEDICARE

## 2024-09-03 DIAGNOSIS — Z94.81 STATUS POST BONE MARROW TRANSPLANT (H): ICD-10-CM

## 2024-09-03 DIAGNOSIS — D89.813 GVHD AS COMPLICATION OF BONE MARROW TRANSPLANT (H): ICD-10-CM

## 2024-09-03 DIAGNOSIS — T86.09 GVHD AS COMPLICATION OF BONE MARROW TRANSPLANT (H): ICD-10-CM

## 2024-09-03 PROCEDURE — 87529 HSV DNA AMP PROBE: CPT | Mod: XU

## 2024-09-03 NOTE — NURSING NOTE
Chief Complaint   Patient presents with    Nurse Visit     Nurse visit s/p BMT r/t ALL     Patient here for nurse only visit to obtain HSV swab of mouth. Patient feels well, denies CP/SOB, N/V/D, h/a and dizziness. Swab obtained. Patient discharged in stable condition.    Catherine Chavez RN

## 2024-09-04 LAB
HSV1 DNA SPEC QL NAA+PROBE: NOT DETECTED
HSV2 DNA SPEC QL NAA+PROBE: NOT DETECTED

## 2024-09-06 ENCOUNTER — TELEPHONE (OUTPATIENT)
Dept: TRANSPLANT | Facility: CLINIC | Age: 64
End: 2024-09-06
Payer: MEDICARE

## 2024-09-06 DIAGNOSIS — D89.813 GVHD AS COMPLICATION OF BONE MARROW TRANSPLANT (H): ICD-10-CM

## 2024-09-06 DIAGNOSIS — T86.09 GVHD AS COMPLICATION OF BONE MARROW TRANSPLANT (H): ICD-10-CM

## 2024-09-06 DIAGNOSIS — Z94.81 STATUS POST BONE MARROW TRANSPLANT (H): Primary | ICD-10-CM

## 2024-09-06 RX ORDER — DEXAMETHASONE 0.5 MG/5ML
1 SOLUTION ORAL 2 TIMES DAILY
Qty: 500 ML | Refills: 2 | Status: SHIPPED | OUTPATIENT
Start: 2024-09-06

## 2024-09-06 RX ORDER — PREDNISONE 5 MG/1
5 TABLET ORAL DAILY
Qty: 30 TABLET | Refills: 1 | Status: SHIPPED | OUTPATIENT
Start: 2024-09-06 | End: 2024-09-30

## 2024-09-06 NOTE — TELEPHONE ENCOUNTER
ALISSA Nava/ [phone]/ [phone]  Patients spouse called to report mouth sores. Patient also left a message in [employer].

## 2024-09-06 NOTE — TELEPHONE ENCOUNTER
I called Nik regarding mouth sores, he states there has been little improvement since increasing his Prednisone. He is able to eat and drink fluids. GIRISH visit requested for next Monday with Labs.

## 2024-09-09 ENCOUNTER — ONCOLOGY VISIT (OUTPATIENT)
Dept: TRANSPLANT | Facility: CLINIC | Age: 64
End: 2024-09-09
Attending: INTERNAL MEDICINE
Payer: MEDICARE

## 2024-09-09 ENCOUNTER — APPOINTMENT (OUTPATIENT)
Dept: LAB | Facility: CLINIC | Age: 64
End: 2024-09-09
Payer: MEDICARE

## 2024-09-09 VITALS
OXYGEN SATURATION: 97 % | HEART RATE: 63 BPM | DIASTOLIC BLOOD PRESSURE: 77 MMHG | BODY MASS INDEX: 34.56 KG/M2 | SYSTOLIC BLOOD PRESSURE: 122 MMHG | TEMPERATURE: 96.3 F | RESPIRATION RATE: 18 BRPM | WEIGHT: 261.9 LBS

## 2024-09-09 DIAGNOSIS — D89.813 GVHD AS COMPLICATION OF BONE MARROW TRANSPLANT (H): ICD-10-CM

## 2024-09-09 DIAGNOSIS — T86.09 GVHD AS COMPLICATION OF BONE MARROW TRANSPLANT (H): ICD-10-CM

## 2024-09-09 DIAGNOSIS — R79.89 LOW SERUM CORTISOL LEVEL: ICD-10-CM

## 2024-09-09 DIAGNOSIS — D80.1 HYPOGAMMAGLOBULINEMIA (H): ICD-10-CM

## 2024-09-09 LAB
ALBUMIN SERPL BCG-MCNC: 4.3 G/DL (ref 3.5–5.2)
ALP SERPL-CCNC: 91 U/L (ref 40–150)
ALT SERPL W P-5'-P-CCNC: 27 U/L (ref 0–70)
ANION GAP SERPL CALCULATED.3IONS-SCNC: 12 MMOL/L (ref 7–15)
AST SERPL W P-5'-P-CCNC: 35 U/L (ref 0–45)
BASOPHILS # BLD AUTO: 0 10E3/UL (ref 0–0.2)
BASOPHILS NFR BLD AUTO: 0 %
BILIRUB SERPL-MCNC: 0.7 MG/DL
BUN SERPL-MCNC: 40.5 MG/DL (ref 8–23)
CALCIUM SERPL-MCNC: 9.8 MG/DL (ref 8.8–10.4)
CHLORIDE SERPL-SCNC: 102 MMOL/L (ref 98–107)
CREAT SERPL-MCNC: 1.25 MG/DL (ref 0.67–1.17)
EGFRCR SERPLBLD CKD-EPI 2021: 64 ML/MIN/1.73M2
EOSINOPHIL # BLD AUTO: 0.1 10E3/UL (ref 0–0.7)
EOSINOPHIL NFR BLD AUTO: 1 %
ERYTHROCYTE [DISTWIDTH] IN BLOOD BY AUTOMATED COUNT: 15.4 % (ref 10–15)
GLUCOSE SERPL-MCNC: 115 MG/DL (ref 70–99)
HCO3 SERPL-SCNC: 25 MMOL/L (ref 22–29)
HCT VFR BLD AUTO: 37.2 % (ref 40–53)
HGB BLD-MCNC: 12.8 G/DL (ref 13.3–17.7)
HOLD SPECIMEN: NORMAL
IGG SERPL-MCNC: 661 MG/DL (ref 610–1616)
IMM GRANULOCYTES # BLD: 0.1 10E3/UL
IMM GRANULOCYTES NFR BLD: 1 %
LYMPHOCYTES # BLD AUTO: 2.8 10E3/UL (ref 0.8–5.3)
LYMPHOCYTES NFR BLD AUTO: 32 %
MCH RBC QN AUTO: 34 PG (ref 26.5–33)
MCHC RBC AUTO-ENTMCNC: 34.4 G/DL (ref 31.5–36.5)
MCV RBC AUTO: 99 FL (ref 78–100)
MONOCYTES # BLD AUTO: 0.9 10E3/UL (ref 0–1.3)
MONOCYTES NFR BLD AUTO: 10 %
NEUTROPHILS # BLD AUTO: 4.9 10E3/UL (ref 1.6–8.3)
NEUTROPHILS NFR BLD AUTO: 56 %
NRBC # BLD AUTO: 0 10E3/UL
NRBC BLD AUTO-RTO: 0 /100
PLATELET # BLD AUTO: 238 10E3/UL (ref 150–450)
POTASSIUM SERPL-SCNC: 4.6 MMOL/L (ref 3.4–5.3)
PROT SERPL-MCNC: 7 G/DL (ref 6.4–8.3)
RBC # BLD AUTO: 3.77 10E6/UL (ref 4.4–5.9)
SODIUM SERPL-SCNC: 139 MMOL/L (ref 135–145)
WBC # BLD AUTO: 8.7 10E3/UL (ref 4–11)

## 2024-09-09 PROCEDURE — G2211 COMPLEX E/M VISIT ADD ON: HCPCS

## 2024-09-09 PROCEDURE — 80053 COMPREHEN METABOLIC PANEL: CPT

## 2024-09-09 PROCEDURE — 85025 COMPLETE CBC W/AUTO DIFF WBC: CPT

## 2024-09-09 PROCEDURE — 82784 ASSAY IGA/IGD/IGG/IGM EACH: CPT

## 2024-09-09 PROCEDURE — 36591 DRAW BLOOD OFF VENOUS DEVICE: CPT

## 2024-09-09 PROCEDURE — 99214 OFFICE O/P EST MOD 30 MIN: CPT

## 2024-09-09 PROCEDURE — 250N000011 HC RX IP 250 OP 636: Performed by: INTERNAL MEDICINE

## 2024-09-09 PROCEDURE — G0463 HOSPITAL OUTPT CLINIC VISIT: HCPCS

## 2024-09-09 RX ORDER — HEPARIN SODIUM (PORCINE) LOCK FLUSH IV SOLN 100 UNIT/ML 100 UNIT/ML
5 SOLUTION INTRAVENOUS EVERY 8 HOURS
Status: DISCONTINUED | OUTPATIENT
Start: 2024-09-09 | End: 2024-09-09 | Stop reason: HOSPADM

## 2024-09-09 RX ADMIN — Medication 5 ML: at 09:43

## 2024-09-09 ASSESSMENT — PAIN SCALES - GENERAL: PAINLEVEL: NO PAIN (0)

## 2024-09-09 NOTE — NURSING NOTE
"Oncology Rooming Note    September 9, 2024 9:56 AM   Nik Nava is a 64 year old male who presents for:    Chief Complaint   Patient presents with    Oncology Clinic Visit     GVHD; hypogammaglobulinemia     Port Draw     Labs drawn via port by RN in lab, vitals taken.      Initial Vitals: /77 (BP Location: Right arm, Patient Position: Sitting, Cuff Size: Adult Large)   Pulse 63   Temp (!) 96.3  F (35.7  C) (Tympanic)   Resp 18   Wt 118.8 kg (261 lb 14.4 oz)   SpO2 97%   BMI 34.56 kg/m   Estimated body mass index is 34.56 kg/m  as calculated from the following:    Height as of 5/9/24: 1.854 m (6' 0.99\").    Weight as of this encounter: 118.8 kg (261 lb 14.4 oz). Body surface area is 2.47 meters squared.  No Pain (0) Comment: Data Unavailable   No LMP for male patient.  Allergies reviewed: Yes  Medications reviewed: Yes    Medications: Medication refills not needed today.  Pharmacy name entered into [employer]:    [facility] - [place], [place] - [address] [facility] [place], [place] - [id number]  [facility]  [facility] - [place], [place] - [address]     Frailty Screening:   Is the patient here for a new oncology consult visit in cancer care? 2. No      Clinical concerns:        Pat Altman              "

## 2024-09-09 NOTE — LETTER
9/9/2024      Nik Nava  [address] [facility] [id number]      Dear Colleague,    Thank you for referring your patient, Nik Nava, to the [facility] BLOOD AND MARROW TRANSPLANT PROGRAM [place]. Please see a copy of my visit note below.      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 Recent oral HSV and oral cGVHD flare treated with prednisone.  Feb 2024 Bronchitis.  Discharged last week from local admission for RSV PNA.       HPI: Nik is here for mouth pain related to his cGVHD. The pain started a few weeks ago after his prednisone was tapered from 10 mg alternating with 15 mg to 10 mg daily. He was instructed last week to increase back to pred 15 mg daily and increase the dex s/s to 4x/day. The lesions were swabbed for HSV which was negative. The pain has improved substantially with the medication adjustments. He feels good from a pain control standpoint and will follow up with Dr. Cote in October.       Review of Systems                                                                                                                                         10 point Review of systems was negative except as detailed above     PHYSICAL EXAM                                                                                                                                                   KPS: 70    Wt Readings from Last 4 Encounters:   09/09/24 118.8 kg (261 lb 14.4 oz)   08/05/24 117.8 kg (259 lb 9.6 oz)   07/09/24 118.1 kg (260 lb 4.8 oz)   05/09/24 117 kg (258 lb)      General: NAD.  HEENT: sclera anicteric, injected conjunctivae.    11/14/2023 No noted lichenoid changes in the oral mucosa previous prominent area of healed ulcers in the right/left mid buccal mucosa are less prominent with overall improved appearence and more normal appearing mucosal tissue, no ulcers seen.     1/9/2024 mild lichenoid changes and healing ulcers on both buccal mucosa no open discolored lesions noted  2/27/2024  no lichenoid changes, healing ulcers/minimal scaring on both buccal mucosa  4/2/2024  no lichenoid changes, healing ulcers/minimal scaring on both buccal mucosa  9/9/2024: lichenoid changes L-buccal mucosa, R-buccal mucosa with minimal ulcers   Lungs:  CTA b/l  no wheezes  CV: RRR  no gallop  Abd; soft, NABS, protuberant  Ext/ Skin: Skin bruising on bilateral forearms,  fainting of previous hyperpigmented areas of previously known cGVHD  LE trace bilateral edema in lower extremities- wearing compression socks    cGVHD therapy started on February 24 2022  - Baseline NIH scoring 2/24/2022 : skin 2 maculopapular rash/erythema with no sclerotic features, mouth score 1 mild symptoms with lichenoid changes less than 25%, eyes score 2 moderate symptoms without new visual impairments due to KCS requiring lubricant eyedrops more than 3 times a day, overall mild scale for her provider  - 3/25/2022 1 month NIH treatment assessment on PQRST: skin 2, mouth score 1 mild symptoms with NO lichenoid changes, eyes score 2 moderate symptoms w requiring lubricant eyedrops more than 3 times a day, liver score 1 ALT 3.7x ULN and nl bilirubin   - 3/31  Mouth clear; eyes minimal LFT still abn; no joint or new GI sx  - 4/28 Mouth clear, Left>R eye is mild sclera erythema, lids are pink and irritated appearing, LFT's slow improvement, no new joint, skin, or GI symptoms.   -5/11 mouth with mild erythema but no lichenoid changes, eyes using eyedrops 4-6 times a day score of 2, LFTs significantly improved from baseline slight elevation in alk phos and AST with normal bilirubin, skin with hyperpigmentation from old acute GVHD no active skin rashes, no GI symptoms no musculoskeletal complaints.  -5/26 Mouth with very slight lichenoid changes, erythema.  Eyes still using eyedrops 4-6x per day.  LFTs essentially normal with slight  elevation in alk phos.  Skin with hyperpigmentation from old acute GVHD, no active rashes, no GI or MSK concerns.  Skin 0, mouth 0 but with lichenoid changes, eyes 2  -6/7/22 Mouth with no lichenoid changes, erythema.  Eyes still using eyedrops 4-6x per day.  LFTs essentially normal with slight elevation in alk phos.  Skin with hyperpigmentation from old acute GVHD, no active rashes, no GI or MSK concerns.  Skin 0, mouth 0, eyes 2     -6 month PQRST assessment 9/6/2022: eyes 3, mouth 0 for symptoms, 25% lichenoid changes (1), liver/GI/skin 0    11/8/2022, 11/22/2022, 12/13/22 cGVHD NIG scoring eyes 3, no other organ involvement clinically  3/28/23 cGVHD NIG scoring eyes 3, no other organ involvement clinically  5/2/23 cGVHD NIG scoring eyes 3, oral 1, no other organ involvement clinically  6/13/23 cGVHD NIG scoring eyes 3, oral 1, no other organ involvement clinically  8/15/23 cGVHD NIG scoring eyes 3 (down to 3-4 times eye drop from >10 but still using scleral lenses), oral 1, no other organ involvement clinically  10/10/2023 cGVHD NIG scoring eyes 3 (down to 3-4 times eye drop from >10 but still using scleral lenses), oral 1, no other organ involvement clinically  11/14/2023 cGVHD NIG scoring eyes 3 (down to 3-4 times eye drop from >10 but still using scleral lenses), oral 1, no other organ involvement clinically  1/9/2024 cGVHD NIG scoring eyes 3 (down to 3-4 times eye drop from >10 but still using scleral lenses), oral 2, no other organ involvement clinically  2/27/2024 cGVHD NIG scoring eyes 3 (down to 3-4 times eye drop from >10 but still using scleral lenses), oral 1, no other organ involvement clinically  4/22024 cGVHD NIG scoring eyes 3 (down to 2 times eye drop from >10 but still using scleral lenses), oral 1, no other organ involvement clinically  8/5/2024 cGVHD NIH scoring eyes 2 (down to 2 times eye drop from >10 but still using scleral lenses), oral 0, no other organ involvement  clinically    Lab:    Lab Results   Component Value Date    WBC 8.3 08/05/2024    ANEU 3.5 10/26/2021    HGB 12.1 (L) 08/05/2024    HCT 37.0 (L) 08/05/2024     08/05/2024     08/05/2024    POTASSIUM 4.1 08/05/2024    CHLORIDE 104 08/05/2024    CO2 24 08/05/2024     (H) 08/05/2024    BUN 37.7 (H) 08/05/2024    CR 1.21 (H) 08/05/2024    MAG 2.0 11/14/2023    INR 0.90 12/01/2022    BILITOTAL 0.5 08/05/2024    AST 32 08/05/2024    ALT 25 08/05/2024    ALKPHOS 78 08/05/2024    PROTTOTAL 6.6 08/05/2024    ALBUMIN 4.1 08/05/2024       ASSESSMENT AND PLAN   Nik Nava is a 64 year old male with Ph Pos ALL, day 1126 s/p sib allo stem cell transplant    Day -6 (8/4): flu/cy  Day -5 through day -2 (8/5-8/8): flu  Day -1 (8/9): TBI  Day 0 (8/10): transplant     1.  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 S/p allo sib PBSCT. (ABO matched)  HCT-CI score: 3 (prior solid tumor)  Day +100 bone marrow biopsy is 100% donor with no morphologic or flow cytometric evidence of leukemia BCR abl is pending.  - Day +180 restaging BM bx- still in CR  100% donor;  2/16/22 BCR ABL major breakpoint undetectable.   - 1 year restaging 8/18/2022: Markedly hypocellular bone marrow [less than 10% cellular] with maturing trilineage hematopoiesis and no definite morphologic or immunophenotypic evidence for involvement by B lymphoblastic leukemia. BCRABL1 PCR not detected, bone marrow % donor. FISH NORMAL No evidence of BCR-ABL1 fusion  - Maintenance with TKI posttransplantation given his Ph positive ALL that have not been started previously presumed secondary to multiple active issues specifically infections and COVID.  Per Avila Tobar: will reconsider this patient will think about whether this is something he would like to consider he was previously on Dasatinib, we would start on a low dose and increase as tolerated.  This is on hold now pending active GVHD therapy, we discussed on  this visit 10/18 in agreement with holding off.  - 2 year restaging 8/7/2023 BMB: - No morphologic or immunophenotypic evidence of recurrent/persistent B-lymphoblastic leukemia. Slightly hypocellular marrow (10-20% estimated cellularity, patchy and variable), with trilineage hematopoetiic maturation and less than 2% blasts. Peripheral blood showing slight normocytic normochromic anemia and slight thrombocytopenia. BM % donor. FISH No evidence of BCR::ABL1 fusion /CG No recipient (male) cells  or cells with a t(9;22) or other clonal chromosomal abnormality were  detected among the 20 metaphases analyzed.      2.  HEME: CBC stable.   3/2023 iron low 28, iron saturation index 10%, transferrin 225, ferritin 1381 down trending (in retrospect that was the time of epidural abscess as well).  B12, folate normal.  High copper and zinc borderline low, 5/2023 started zinc.  Started iron replacement in 4/2023, recheck iron profile on follow up more consistent with AOCD, Iron 11/14/23 WNL. Note ferritin elevation is in the setting of discitis/OM, 4/2 down to 1500 but recovering from RSV with nl iron profile, low threshold to obtian ferriscan to objectively assess for iron overload.     3.  FEN/Renal: Creatinine 0.97, s/p nephrectormy, nephrology following     4.    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   8/5/24: Prednisone 15/10  8/28: Pred increased back 15 mg daily, dex s/s QID     #Skin GVHD- history of biopsy proven GVHD of the skin 8/30/2021; resolved.   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 WNL today   # Ocular GVHD: follows with ophthalmology. Maxitrol to lids, continue refreshing drops QID. Score 3, but down to 2 times eye drops from >10/day, using scleral lenses  Followed closely by Dr. Juarez with significant improvement with scleral lenses and eyedrops  # Oral GVHD: pred increase as above, dex s/s QID. Lichenoid changes  prominent L-buccal mucosa. Good pain control during appointment on 9/9/24.     Previous therapies  Tacrolimus-previously decided to stay on same dose, no checks of levels if clinically stable, and no need switch to sirolimus. (keep tac low as gvhd is quiet and viremia). 5/10 level 45 with starting of COVID therapy and D-D intractions (Paxlovid for COVID19, which has a drug interaction with tacrolimus-Ritonavir increases serum levels of tacrolimus).   Tacrolimus level subtherapeutic, increase to 2.5 mg twice daily recently, 8/23/2022 instructed to change tacrolimus to 2 mg twice daily. 9/6 with mild NEGRA, hyperkalemia, hypomagnesemia, and reports some tremors and cramps, suspect tacrolimus to be high therefore empirically decreased to 1.5 twice daily, skip morning dose of tacrolimus and took 2 mg today after his afternoon labs. Decrease to 1mg BID on Saturday.  Sirolimus  9/21 despite fluids and a dose adjustment of tacrolimus patient seem to have persistence NORBERTO related NEGRA, therefore after discussion with the patient decision was made to transition to sirolimus that was started on 9/21, patient initially seem to tolerate this well with normalization of kidney function  10/11 presented with flares of ocular and oral GVHD, fluid retention and gain of 5-6 kg, lower extremity edema, abdominal distention, total protein to creatinine ratio elevated at 0.4, in addition to elevation in BUN and creatinine, HTN.  Picture most consistent with sirolimus related side effects.  Less likely that the patient will tolerate even a lower level of sirolimus.  Therefore the patient was instructed to stop sirolimus, last dose on 10/10. 10/14 level 3.5 appropriately trending down.     Corticosteroids  3/1-3/16: prednisone 100mg every day  3/17 75mg every day   3/31 75 alt with 50  4/6: 75 alt with 25mg every other day  4/12 pred tapered to 60 alternating with 25mg   4/15 In setting of viruses trying to reactivate,GVHD stable: Taper  60/15mg alternating.  4/20 decrease pred further to 50/10.    4/25 taper pred to 50/0 every other day as long as symptoms stable.   5/2 Pred decrease 40/0 alternating every other day.   5/13 decrease to 30/0  5/20 decrease to 20/0 then slowly by 5 mg weekly  6/7 decrease to 10/0  Went up to 15/0 with mild increased LFTs  8/23/2022 increased to 20/0, with persistence of oral ulcers and active ocular GVHD.  Discussed with the patient the importance of stopping chewing of nicotine gums for prolonged hours as that would not help with the healing of the oral ulcers.  I discussed with the patient alternatives of nicotine patches that he will reconsider and let me know.  9/6 decreased to 15 alternating with 0  9/13 decreased to 10 alternating with 0  9/21 discontinued tacrolimus given persistent NEGRA and single kidney and start the patient on sirolimus without loading dose.  We will get a list of possible side effects and adverse events from the Pharm.D. see sirolimus section above.  10/11 GVHD flare. Sirolimus stopped. Resume prednisone 40 mg daily as of 10/12, no change with mildly worsening eye pain 10/14  Reduce pred to 35mg 10/25 and 25mg 11/1 11/8 20 mg   11/22 15 mg  12/13/22 10 mg (plan to taper to 5mg then slow taper 1 mg per month given long pred history)  2/23/23 lower from 5 mg to 4 mg for the next month  3/28/2023 - 5/2/2023- 8/15/2023 continue on 10/4 given adrenal insufficieny with recent am cortisol check and clinical symptoms on taper to lower dose of 4 mg daily  -12/2023 had oral GVHD flare started on Prednisone 40 mg,  start taper to 40 and 30 mg alternating for 1 week then 30 mg daily for 1 week, then 30 and 20 alternating for 1 week, then 20 for 1 week till he sees me.  - 2/27/2024 decrease from 10/20mg prednisone to 10/15 then 10/10 -flared and now on 15mg with   - 4/2/2024  15mg  - 5/9/24 15/10  - 8/5/24 10 daily  - 9/9/24 15 mg daily     Belumosudil  Patient intolerant/with disease flares on  tacrolimus and sirolimus see above.  Discussed with the patient the different options for therapy of chronic GVHD that are FDA approved.  Given the side effect profiles after discussing in detail and given the least nephrotoxicity and expected response, taking into account other metabolic effect as well.  We will proceed with prior authorization with belumosudil.  Patient was given material to review for possible expected adverse events and side effects with both Belumosodil and ruxolitinib.   --- Started on 10/18/2022 - skin/liver/oral GVHD inactive, and occular symptoms controlled/symptomatic improvement.   --- 10/10/2023 will hold belumosodil given recurrent infections ans significant improvement in symptoms with no end organ damage after discussions with him and his wife. Will optimize topical treatment with scleral lenses, eyedrops ans dex s/sp int he interim to avoid flares. Will readdress need to restart systemic treatment pending infectious resolution and symptoms.      5.  ID:   # Recent history of Epidural abscess: Followed by Dr. Candelario ID, plan to be on IV ceftriaxone for at least 6 weeks course through 5/11/2023-to be determined on further follow-up.  See imaging with MRI follow-up as above.     #Recurrent discitis/OM still on treatment through October 2023, cx negative, managed with ID locally.  3/30/2023 blood culture with Staph epidermidis  3/31/2023 BONE, L5, FLUOROSCOPICALLY-GUIDED NEEDLE BIOPSY: Benign bone with trilineage hematopoiesis (normal) Negative for neoplasm Negative for acute osteomyelitis. DISC, L5-S1, ASPIRATION FOR CYTOLOGY: Acellular debris; no cellular material present for evaluation     #History of COVID x2 last 1/2023 and influenza    Treated with Paxlovid with persistent fatigue but no active respiratory symptoms  received his influenza annual vaccine as well as COVID boosters locally 11/16/2022     #History of pulmonary infiltrates:   4/20 CT chest with new RUL infiltrate.  Highly immunocompromised. 4/22 Fungitell/asp GM were neg. BAL 4/29 NGTD, increased micafungin to 150mg IV daily-- f/u 6/1 Dr Garcia CT with mixed results, followed with Dr. Garcia August 2022 with resolution of pulmonary nodules and normalization of LFTs switched patient to posaconazole prophylactic dose and monitor LFTs and any infectious concerns closely. Monitor and low threshold check CT or add back if on higher dose steroids.     #History of CMV viremia:    - CMV viremia up to 1100. 4/15 started valcyte 900mg PO BID. 4/20 CMV <137, 5/10 ND, decreased to 450mg BID 4/30 - continue 4 weeks as long as CMV <137 till 5/28 + weekly CMV. Switched to acyclovir after completion of therapy 5/28/2022. recheck 3/1/23 ND    # History of Oral herpes  12/2023:HSV oral lesions; PCR positive. Stopped ACV last month and lesions appears soon thereafter. Empirically Rx Valtrex. mild lingering URI sx; RVP + rhino (12/21); supportive cares. (CCT 12/1/23 neg).started on pred 40mg/d.  Completed Valtrex course and put back on acyclovir restarted 1/20/2024    # History of PNA/bronchitis 2/9/2024 sp doxycyline    # History of RSV PNA 3/26/2024, s/p ribavirin    #Hypogammaglobulinemia with recurrent infections: maintain IgG > 400       - PPx:   ---ACV  Patient developed oral herpes reactivation after stopping acyclovir therefore resumed on 1/9/2023  ---Posaconazole (previously on micafungin with LFts abl, hx of pulmonary nodules needign treatment dose). 11/14/2023 discontinue and check CT in 2 months given history completed December 2023 with no concerns for infections or nodular lesions. Monitor and low threshold check CT or add back if on higher dose steroids.  ---Pentamidine, future 4/22/2024  ---On Penicillin 250 bid px     - EBV viremia: 4/20 CAP CT (w/ contrast): No adenopathy. S/p 4/22 and 5/4/22 rituximab 375mg/m2 5/10 ND x2, 3/13/2024     -  - vaccinations: Previously deferred annual vaccines in the setting of GVHD and  immunosuppression therapy. 11/14/2023 we will start vaccination series, including Shingrix, COVID-vaccine. 1/9/2023 Shingrix vaccine.  Needs boosters on next follow up given today RSV recovery    6. Endo:   - Hx of graves disease; On synthroid follows with endocrine  - HLD: 11/2022 cholesterol 355, triglycerides 153, -- 11/8 started rosuvastatin 5 mg daily, 11/22 CPK within normal, 5/2/2023 repeat lipid profile cholesterol down to 202, triglycerides 191, LDL now normal at 95.  We will continue same dose of rosuvastatin and add fish oil 1g BID (on hold). Repeated August with PCP, who added back fish oil    7. CV:   - Hypertension history   - TTE most recently 10/2022, ECHO in January 2024 at [facility]     8. GI:   -Omeprazole for heartburn  -LFTs as above, now normal. Off ursodiol     9. Psych:   - Situational anxiety - lexapro 10mg daily.   - Insomnia: worse on steroids. Ativan, trazadone, melatonin     10. MSK:   -History of left food drop, PT. Occasional muscle cramps discussed optimizing vitamin D levels and considering vitamin D, some of the symptomatology of muscle cramps are likely related to chronic GVHD.  No muscle cramps reported today.  -4/2023 new tearing of the right acetabular labrum anterosuperiorly referred to to orthopedic surgery.       11. HCM/Age appropriate cancer screening:  -Discussed with the patient importance of age-appropriate cancer screening including colonoscopies, he is due for this.  -Discussed importance of at least once a year vitamin D level check, 8/18/2022 wnl  -Screening for secondary iron overload, see heme section above  -Yearly TSH 2022 wnl; yearly lipid panel - see GI section above  -9/6/2022 DEXA scan Based on BMD diagnosis is consistent with normal bone density based on WHO criteria Ref. 1   -PFTs 9/20/22, see above. 9/21/2023 PFTs The FVC, FEV1, FEV1/FVC ratio and JQC00-58% are within normal limits. FVC 99% (108), FEV1 91%, DLCO uncorrected 91%.  Repeat PFTs in 4 to 6  months.  -Metabolic other, 8/2022 testosterone within normal  -11/22/2022 discussed with the patient the challenges and the stresses of chronic illness and healthcare fatigue.  Patient had previously been on Lexapro that we restarted confirmed with pharmacy that there are no drug drug interactions.  Additionally we will referred patient to PMNR to help with physical rehab and strength to help with fatigue.  Our social workers will also reach out to Nik and his wife for further resources including support groups for patients with chronic illness and fatigue post transplant.  -Referral to palliative care for symptom and pain management including insomnia     Summary: I recommended that he reconsider a sleep evaluation because of the potential effect of sleep apnea on sleep quality and the resulting effect on daytime sleepiness and fatigue.  He is open to the idea although somewhat resistant to using the device.  I pointed out that there may be new devices or other ways to make this more tolerable.  He had difficulty with prednisone doses lower than 15 mg and we agreed to continue the current dosing.    Plan:  - continue prednisone 15 mg daily along with dex s/s QID, next appt with Dr. Cote on 10/18/24   - IVIG if IgG lower than 400  - continue follow up for endo, neph, PCP      The longitudinal plan of care for the diagnosis(es)/condition(s) as documented were addressed during this visit. Due to the added complexity in care, I will continue to support Nik in the subsequent management and with ongoing continuity of care.      30 minutes spent on the date of the encounter doing chart review, history and exam, lab review, documentation and coordniating care.    Fracisco Smith PA-C       Again, thank you for allowing me to participate in the care of your patient.        Sincerely,        BMT Advanced Practice Provider

## 2024-09-09 NOTE — PROGRESS NOTES
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 Recent oral HSV and oral cGVHD flare treated with prednisone.  Feb 2024 Bronchitis.  Discharged last week from local admission for RSV PNA.       HPI: Nik is here for mouth pain related to his cGVHD. The pain started a few weeks ago after his prednisone was tapered from 10 mg alternating with 15 mg to 10 mg daily. He was instructed last week to increase back to pred 15 mg daily and increase the dex s/s to 4x/day. The lesions were swabbed for HSV which was negative. The pain has improved substantially with the medication adjustments. He feels good from a pain control standpoint and will follow up with Dr. Cote in October.       Review of Systems                                                                                                                                         10 point Review of systems was negative except as detailed above     PHYSICAL EXAM                                                                                                                                                   KPS: 70    Wt Readings from Last 4 Encounters:   09/09/24 118.8 kg (261 lb 14.4 oz)   08/05/24 117.8 kg (259 lb 9.6 oz)   07/09/24 118.1 kg (260 lb 4.8 oz)   05/09/24 117 kg (258 lb)      General: NAD.  HEENT: sclera anicteric, injected conjunctivae.    11/14/2023 No noted lichenoid changes in the oral mucosa previous prominent area of healed ulcers in the right/left mid buccal mucosa are less prominent with overall improved appearence and more normal appearing mucosal tissue, no ulcers seen.    1/9/2024 mild lichenoid changes and healing ulcers on both buccal mucosa no open discolored lesions noted  2/27/2024  no lichenoid changes, healing ulcers/minimal scaring on both buccal mucosa  4/2/2024  no lichenoid changes, healing ulcers/minimal scaring on both  buccal mucosa  9/9/2024: lichenoid changes L-buccal mucosa, R-buccal mucosa with minimal ulcers   Lungs:  CTA b/l  no wheezes  CV: RRR  no gallop  Abd; soft, NABS, protuberant  Ext/ Skin: Skin bruising on bilateral forearms,  fainting of previous hyperpigmented areas of previously known cGVHD  LE trace bilateral edema in lower extremities- wearing compression socks    cGVHD therapy started on February 24 2022  - Baseline NIH scoring 2/24/2022 : skin 2 maculopapular rash/erythema with no sclerotic features, mouth score 1 mild symptoms with lichenoid changes less than 25%, eyes score 2 moderate symptoms without new visual impairments due to KCS requiring lubricant eyedrops more than 3 times a day, overall mild scale for her provider  - 3/25/2022 1 month NIH treatment assessment on PQRST: skin 2, mouth score 1 mild symptoms with NO lichenoid changes, eyes score 2 moderate symptoms w requiring lubricant eyedrops more than 3 times a day, liver score 1 ALT 3.7x ULN and nl bilirubin   - 3/31  Mouth clear; eyes minimal LFT still abn; no joint or new GI sx  - 4/28 Mouth clear, Left>R eye is mild sclera erythema, lids are pink and irritated appearing, LFT's slow improvement, no new joint, skin, or GI symptoms.   -5/11 mouth with mild erythema but no lichenoid changes, eyes using eyedrops 4-6 times a day score of 2, LFTs significantly improved from baseline slight elevation in alk phos and AST with normal bilirubin, skin with hyperpigmentation from old acute GVHD no active skin rashes, no GI symptoms no musculoskeletal complaints.  -5/26 Mouth with very slight lichenoid changes, erythema.  Eyes still using eyedrops 4-6x per day.  LFTs essentially normal with slight elevation in alk phos.  Skin with hyperpigmentation from old acute GVHD, no active rashes, no GI or MSK concerns.  Skin 0, mouth 0 but with lichenoid changes, eyes 2  -6/7/22 Mouth with no lichenoid changes, erythema.  Eyes still using eyedrops 4-6x per day.  LFTs  essentially normal with slight elevation in alk phos.  Skin with hyperpigmentation from old acute GVHD, no active rashes, no GI or MSK concerns.  Skin 0, mouth 0, eyes 2     -6 month PQRST assessment 9/6/2022: eyes 3, mouth 0 for symptoms, 25% lichenoid changes (1), liver/GI/skin 0    11/8/2022, 11/22/2022, 12/13/22 cGVHD NIG scoring eyes 3, no other organ involvement clinically  3/28/23 cGVHD NIG scoring eyes 3, no other organ involvement clinically  5/2/23 cGVHD NIG scoring eyes 3, oral 1, no other organ involvement clinically  6/13/23 cGVHD NIG scoring eyes 3, oral 1, no other organ involvement clinically  8/15/23 cGVHD NIG scoring eyes 3 (down to 3-4 times eye drop from >10 but still using scleral lenses), oral 1, no other organ involvement clinically  10/10/2023 cGVHD NIG scoring eyes 3 (down to 3-4 times eye drop from >10 but still using scleral lenses), oral 1, no other organ involvement clinically  11/14/2023 cGVHD NIG scoring eyes 3 (down to 3-4 times eye drop from >10 but still using scleral lenses), oral 1, no other organ involvement clinically  1/9/2024 cGVHD NIG scoring eyes 3 (down to 3-4 times eye drop from >10 but still using scleral lenses), oral 2, no other organ involvement clinically  2/27/2024 cGVHD NIG scoring eyes 3 (down to 3-4 times eye drop from >10 but still using scleral lenses), oral 1, no other organ involvement clinically  4/22024 cGVHD NIG scoring eyes 3 (down to 2 times eye drop from >10 but still using scleral lenses), oral 1, no other organ involvement clinically  8/5/2024 cGVHD NIH scoring eyes 2 (down to 2 times eye drop from >10 but still using scleral lenses), oral 0, no other organ involvement clinically    Lab:    Lab Results   Component Value Date    WBC 8.3 08/05/2024    ANEU 3.5 10/26/2021    HGB 12.1 (L) 08/05/2024    HCT 37.0 (L) 08/05/2024     08/05/2024     08/05/2024    POTASSIUM 4.1 08/05/2024    CHLORIDE 104 08/05/2024    CO2 24 08/05/2024      (H) 08/05/2024    BUN 37.7 (H) 08/05/2024    CR 1.21 (H) 08/05/2024    MAG 2.0 11/14/2023    INR 0.90 12/01/2022    BILITOTAL 0.5 08/05/2024    AST 32 08/05/2024    ALT 25 08/05/2024    ALKPHOS 78 08/05/2024    PROTTOTAL 6.6 08/05/2024    ALBUMIN 4.1 08/05/2024       ASSESSMENT AND PLAN   Nik Nava is a 64 year old male with Ph Pos ALL, day 1126 s/p sib allo stem cell transplant    Day -6 (8/4): flu/cy  Day -5 through day -2 (8/5-8/8): flu  Day -1 (8/9): TBI  Day 0 (8/10): transplant     1.  Acute lymphoblastic leukemia, [place] chromosome positive in CR, MRD neg. S/p allo sib PBSCT. (ABO matched)  HCT-CI score: 3 (prior solid tumor)  Day +100 bone marrow biopsy is 100% donor with no morphologic or flow cytometric evidence of leukemia BCR abl is pending.  - Day +180 restaging BM bx- still in CR  100% donor;  2/16/22 BCR ABL major breakpoint undetectable.   - 1 year restaging 8/18/2022: Markedly hypocellular bone marrow [less than 10% cellular] with maturing trilineage hematopoiesis and no definite morphologic or immunophenotypic evidence for involvement by B lymphoblastic leukemia. BCRABL1 PCR not detected, bone marrow % donor. FISH NORMAL No evidence of BCR-ABL1 fusion  - Maintenance with TKI posttransplantation given his Ph positive ALL that have not been started previously presumed secondary to multiple active issues specifically infections and COVID.  Per Avila Tobar: will reconsider this patient will think about whether this is something he would like to consider he was previously on Dasatinib, we would start on a low dose and increase as tolerated.  This is on hold now pending active GVHD therapy, we discussed on this visit 10/18 in agreement with holding off.  - 2 year restaging 8/7/2023 BMB: - No morphologic or immunophenotypic evidence of recurrent/persistent B-lymphoblastic leukemia. Slightly hypocellular marrow (10-20% estimated cellularity, patchy and variable), with trilineage  hematopoetiic maturation and less than 2% blasts. Peripheral blood showing slight normocytic normochromic anemia and slight thrombocytopenia. BM % donor. FISH No evidence of BCR::ABL1 fusion /CG No recipient (male) cells  or cells with a t(9;22) or other clonal chromosomal abnormality were  detected among the 20 metaphases analyzed.      2.  HEME: CBC stable.   3/2023 iron low 28, iron saturation index 10%, transferrin 225, ferritin 1381 down trending (in retrospect that was the time of epidural abscess as well).  B12, folate normal.  High copper and zinc borderline low, 5/2023 started zinc.  Started iron replacement in 4/2023, recheck iron profile on follow up more consistent with AOCD, Iron 11/14/23 WNL. Note ferritin elevation is in the setting of discitis/OM, 4/2 down to 1500 but recovering from RSV with nl iron profile, low threshold to obtian ferriscan to objectively assess for iron overload.     3.  FEN/Renal: Creatinine 0.97, s/p nephrectormy, nephrology following     4.    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   8/5/24: Prednisone 15/10  8/28: Pred increased back 15 mg daily, dex s/s QID     #Skin GVHD- history of biopsy proven GVHD of the skin 8/30/2021; resolved.   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 WNL today   # Ocular GVHD: follows with ophthalmology. Maxitrol to lids, continue refreshing drops QID. Score 3, but down to 2 times eye drops from >10/day, using scleral lenses  Followed closely by Dr. Juarez with significant improvement with scleral lenses and eyedrops  # Oral GVHD: pred increase as above, dex s/s QID. Lichenoid changes prominent L-buccal mucosa. Good pain control during appointment on 9/9/24.     Previous therapies  Tacrolimus-previously decided to stay on same dose, no checks of levels if clinically stable, and no need switch to sirolimus. (keep tac low as gvhd is quiet and viremia). 5/10 level 45  with starting of COVID therapy and D-D intractions (Paxlovid for COVID19, which has a drug interaction with tacrolimus-Ritonavir increases serum levels of tacrolimus).   Tacrolimus level subtherapeutic, increase to 2.5 mg twice daily recently, 8/23/2022 instructed to change tacrolimus to 2 mg twice daily. 9/6 with mild NEGRA, hyperkalemia, hypomagnesemia, and reports some tremors and cramps, suspect tacrolimus to be high therefore empirically decreased to 1.5 twice daily, skip morning dose of tacrolimus and took 2 mg today after his afternoon labs. Decrease to 1mg BID on Saturday.  Sirolimus  9/21 despite fluids and a dose adjustment of tacrolimus patient seem to have persistence NORBERTO related NEGRA, therefore after discussion with the patient decision was made to transition to sirolimus that was started on 9/21, patient initially seem to tolerate this well with normalization of kidney function  10/11 presented with flares of ocular and oral GVHD, fluid retention and gain of 5-6 kg, lower extremity edema, abdominal distention, total protein to creatinine ratio elevated at 0.4, in addition to elevation in BUN and creatinine, HTN.  Picture most consistent with sirolimus related side effects.  Less likely that the patient will tolerate even a lower level of sirolimus.  Therefore the patient was instructed to stop sirolimus, last dose on 10/10. 10/14 level 3.5 appropriately trending down.     Corticosteroids  3/1-3/16: prednisone 100mg every day  3/17 75mg every day   3/31 75 alt with 50  4/6: 75 alt with 25mg every other day  4/12 pred tapered to 60 alternating with 25mg   4/15 In setting of viruses trying to reactivate,GVHD stable: Taper 60/15mg alternating.  4/20 decrease pred further to 50/10.    4/25 taper pred to 50/0 every other day as long as symptoms stable.   5/2 Pred decrease 40/0 alternating every other day.   5/13 decrease to 30/0  5/20 decrease to 20/0 then slowly by 5 mg weekly  6/7 decrease to 10/0  Went up to  15/0 with mild increased LFTs  8/23/2022 increased to 20/0, with persistence of oral ulcers and active ocular GVHD.  Discussed with the patient the importance of stopping chewing of nicotine gums for prolonged hours as that would not help with the healing of the oral ulcers.  I discussed with the patient alternatives of nicotine patches that he will reconsider and let me know.  9/6 decreased to 15 alternating with 0  9/13 decreased to 10 alternating with 0  9/21 discontinued tacrolimus given persistent NEGRA and single kidney and start the patient on sirolimus without loading dose.  We will get a list of possible side effects and adverse events from the Pharm.D. see sirolimus section above.  10/11 GVHD flare. Sirolimus stopped. Resume prednisone 40 mg daily as of 10/12, no change with mildly worsening eye pain 10/14  Reduce pred to 35mg 10/25 and 25mg 11/1 11/8 20 mg   11/22 15 mg  12/13/22 10 mg (plan to taper to 5mg then slow taper 1 mg per month given long pred history)  2/23/23 lower from 5 mg to 4 mg for the next month  3/28/2023 - 5/2/2023- 8/15/2023 continue on 10/4 given adrenal insufficieny with recent am cortisol check and clinical symptoms on taper to lower dose of 4 mg daily  -12/2023 had oral GVHD flare started on Prednisone 40 mg,  start taper to 40 and 30 mg alternating for 1 week then 30 mg daily for 1 week, then 30 and 20 alternating for 1 week, then 20 for 1 week till he sees me.  - 2/27/2024 decrease from 10/20mg prednisone to 10/15 then 10/10 -flared and now on 15mg with   - 4/2/2024  15mg  - 5/9/24 15/10  - 8/5/24 10 daily  - 9/9/24 15 mg daily     Belumosudil  Patient intolerant/with disease flares on tacrolimus and sirolimus see above.  Discussed with the patient the different options for therapy of chronic GVHD that are FDA approved.  Given the side effect profiles after discussing in detail and given the least nephrotoxicity and expected response, taking into account other metabolic effect as  well.  We will proceed with prior authorization with belumosudil.  Patient was given material to review for possible expected adverse events and side effects with both Belumosodil and ruxolitinib.   --- Started on 10/18/2022 - skin/liver/oral GVHD inactive, and occular symptoms controlled/symptomatic improvement.   --- 10/10/2023 will hold belumosodil given recurrent infections ans significant improvement in symptoms with no end organ damage after discussions with him and his wife. Will optimize topical treatment with scleral lenses, eyedrops ans dex s/sp int he interim to avoid flares. Will readdress need to restart systemic treatment pending infectious resolution and symptoms.      5.  ID:   # Recent history of Epidural abscess: Followed by Dr. Candelario ID, plan to be on IV ceftriaxone for at least 6 weeks course through 5/11/2023-to be determined on further follow-up.  See imaging with MRI follow-up as above.     #Recurrent discitis/OM still on treatment through October 2023, cx negative, managed with ID locally.  3/30/2023 blood culture with Staph epidermidis  3/31/2023 BONE, L5, FLUOROSCOPICALLY-GUIDED NEEDLE BIOPSY: Benign bone with trilineage hematopoiesis (normal) Negative for neoplasm Negative for acute osteomyelitis. DISC, L5-S1, ASPIRATION FOR CYTOLOGY: Acellular debris; no cellular material present for evaluation     #History of COVID x2 last 1/2023 and influenza    Treated with Paxlovid with persistent fatigue but no active respiratory symptoms  received his influenza annual vaccine as well as COVID boosters locally 11/16/2022     #History of pulmonary infiltrates:   4/20 CT chest with new RUL infiltrate. Highly immunocompromised. 4/22 Fungitell/asp GM were neg. BAL 4/29 NGTD, increased micafungin to 150mg IV daily-- f/u 6/1 Dr Garcia CT with mixed results, followed with Dr. Garcia August 2022 with resolution of pulmonary nodules and normalization of LFTs switched patient to posaconazole prophylactic dose  and monitor LFTs and any infectious concerns closely. Monitor and low threshold check CT or add back if on higher dose steroids.     #History of CMV viremia:    - CMV viremia up to 1100. 4/15 started valcyte 900mg PO BID. 4/20 CMV <137, 5/10 ND, decreased to 450mg BID 4/30 - continue 4 weeks as long as CMV <137 till 5/28 + weekly CMV. Switched to acyclovir after completion of therapy 5/28/2022. recheck 3/1/23 ND    # History of Oral herpes  12/2023:HSV oral lesions; PCR positive. Stopped ACV last month and lesions appears soon thereafter. Empirically Rx Valtrex. mild lingering URI sx; RVP + rhino (12/21); supportive cares. (CCT 12/1/23 neg).started on pred 40mg/d.  Completed Valtrex course and put back on acyclovir restarted 1/20/2024    # History of PNA/bronchitis 2/9/2024 sp doxycyline    # History of RSV PNA 3/26/2024, s/p ribavirin    #Hypogammaglobulinemia with recurrent infections: maintain IgG > 400       - PPx:   ---ACV  Patient developed oral herpes reactivation after stopping acyclovir therefore resumed on 1/9/2023  ---Posaconazole (previously on micafungin with LFts abl, hx of pulmonary nodules needign treatment dose). 11/14/2023 discontinue and check CT in 2 months given history completed December 2023 with no concerns for infections or nodular lesions. Monitor and low threshold check CT or add back if on higher dose steroids.  ---Pentamidine, future 4/22/2024  ---On Penicillin 250 bid px     - EBV viremia: 4/20 CAP CT (w/ contrast): No adenopathy. S/p 4/22 and 5/4/22 rituximab 375mg/m2 5/10 ND x2, 3/13/2024     -  - vaccinations: Previously deferred annual vaccines in the setting of GVHD and immunosuppression therapy. 11/14/2023 we will start vaccination series, including Shingrix, COVID-vaccine. 1/9/2023 Shingrix vaccine.  Needs boosters on next follow up given today RSV recovery    6. Endo:   - Hx of graves disease; On synthroid follows with endocrine  - HLD: 11/2022 cholesterol 355,  triglycerides 153, -- 11/8 started rosuvastatin 5 mg daily, 11/22 CPK within normal, 5/2/2023 repeat lipid profile cholesterol down to 202, triglycerides 191, LDL now normal at 95.  We will continue same dose of rosuvastatin and add fish oil 1g BID (on hold). Repeated August with PCP, who added back fish oil    7. CV:   - Hypertension history   - TTE most recently 10/2022, ECHO in January 2024 at [facility]     8. GI:   -Omeprazole for heartburn  -LFTs as above, now normal. Off ursodiol     9. Psych:   - Situational anxiety - lexapro 10mg daily.   - Insomnia: worse on steroids. Ativan, trazadone, melatonin     10. MSK:   -History of left food drop, PT. Occasional muscle cramps discussed optimizing vitamin D levels and considering vitamin D, some of the symptomatology of muscle cramps are likely related to chronic GVHD.  No muscle cramps reported today.  -4/2023 new tearing of the right acetabular labrum anterosuperiorly referred to to orthopedic surgery.       11. HCM/Age appropriate cancer screening:  -Discussed with the patient importance of age-appropriate cancer screening including colonoscopies, he is due for this.  -Discussed importance of at least once a year vitamin D level check, 8/18/2022 wnl  -Screening for secondary iron overload, see heme section above  -Yearly TSH 2022 wnl; yearly lipid panel - see GI section above  -9/6/2022 DEXA scan Based on BMD diagnosis is consistent with normal bone density based on WHO criteria Ref. 1   -PFTs 9/20/22, see above. 9/21/2023 PFTs The FVC, FEV1, FEV1/FVC ratio and STE98-01% are within normal limits. FVC 99% (108), FEV1 91%, DLCO uncorrected 91%.  Repeat PFTs in 4 to 6 months.  -Metabolic other, 8/2022 testosterone within normal  -11/22/2022 discussed with the patient the challenges and the stresses of chronic illness and healthcare fatigue.  Patient had previously been on Lexapro that we restarted confirmed with pharmacy that there are no drug drug interactions.   Additionally we will referred patient to PMNR to help with physical rehab and strength to help with fatigue.  Our social workers will also reach out to Nik and his wife for further resources including support groups for patients with chronic illness and fatigue post transplant.  -Referral to palliative care for symptom and pain management including insomnia     Summary: I recommended that he reconsider a sleep evaluation because of the potential effect of sleep apnea on sleep quality and the resulting effect on daytime sleepiness and fatigue.  He is open to the idea although somewhat resistant to using the device.  I pointed out that there may be new devices or other ways to make this more tolerable.  He had difficulty with prednisone doses lower than 15 mg and we agreed to continue the current dosing.    Plan:  - continue prednisone 15 mg daily along with dex s/s QID, next appt with Dr. Cote on 10/18/24   - IVIG if IgG lower than 400  - continue follow up for endo, neph, PCP      The longitudinal plan of care for the diagnosis(es)/condition(s) as documented were addressed during this visit. Due to the added complexity in care, I will continue to support Nik in the subsequent management and with ongoing continuity of care.      30 minutes spent on the date of the encounter doing chart review, history and exam, lab review, documentation and coordniating care.    Fracisco Smith PA-C

## 2024-09-09 NOTE — NURSING NOTE
Chief Complaint   Patient presents with    Oncology Clinic Visit     GVHD; hypogammaglobulinemia     Port Draw     Labs drawn via port by RN in lab, vitals taken.      Labs drawn via port by RN. Port accessed with 20g, 3/4in, power needle. Flushed with saline and heparin. Pt tolerated well. Vitals taken. Pt checked into next appt.       Flory Menezes RN

## 2024-09-18 ENCOUNTER — OFFICE VISIT (OUTPATIENT)
Dept: OPTOMETRY | Facility: CLINIC | Age: 64
End: 2024-09-18
Payer: MEDICARE

## 2024-09-18 DIAGNOSIS — H04.129 DRY EYE: Primary | ICD-10-CM

## 2024-09-18 DIAGNOSIS — D89.813 GVHD AS COMPLICATION OF BONE MARROW TRANSPLANT (H): ICD-10-CM

## 2024-09-18 DIAGNOSIS — T86.09 GVHD AS COMPLICATION OF BONE MARROW TRANSPLANT (H): ICD-10-CM

## 2024-09-18 ASSESSMENT — REFRACTION_CURRENTRX
OD_DIAMETER: 14.9
OS_BASECURVE: 8.2
OS_BRAND: ONEFIT
OD_BRAND: ONEFIT
OS_SPHERE: +0.50
OD_BASECURVE: 8.1
OD_SPHERE: -0.62
OD_ADDL_SPECS: OPT EXTRA CLEAR, HYDRAPEG
OS_DIAMETER: 14.9
OS_ADDL_SPECS: OPT EXTRA BLUE, HYDRAPEG

## 2024-09-18 NOTE — PROGRESS NOTES
No office visit. CL order only. Pt needs duplicate right lens    Contact Lens Billing  V-Code:  - Scleral Cover Shell  Final Contact Lens Rx         Brand Base Curve Diameter Sphere Lens Addl. Specs    Right Onefit 8.1 14.9 -0.62 std x 2 flat Opt Extra clear, HydraPEG    Left Onefit 8.2 14.9 +0.50 2 flat edge Opt Extra blue, HydraPEG           # of units: 1 right lens  Price per Unit: $245    This patient requires contact lenses that are medically necessary for either improvement in vision over spectacles, support of the ocular surface, or other therapeutic benefit. These are not cosmetic contact lenses.     Encounter Diagnoses   Name Primary?    Dry eye Yes    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)         Date of last eye exam: 8/26/24

## 2024-09-23 ENCOUNTER — OFFICE VISIT (OUTPATIENT)
Dept: SLEEP MEDICINE | Facility: CLINIC | Age: 64
End: 2024-09-23
Payer: MEDICARE

## 2024-09-23 VITALS
WEIGHT: 262.1 LBS | OXYGEN SATURATION: 96 % | HEART RATE: 64 BPM | SYSTOLIC BLOOD PRESSURE: 120 MMHG | DIASTOLIC BLOOD PRESSURE: 81 MMHG | HEIGHT: 73 IN | BODY MASS INDEX: 34.74 KG/M2

## 2024-09-23 DIAGNOSIS — E78.2 MIXED HYPERLIPIDEMIA: ICD-10-CM

## 2024-09-23 DIAGNOSIS — R06.83 LOUD SNORING: ICD-10-CM

## 2024-09-23 DIAGNOSIS — E66.811 OBESITY (BMI 30.0-34.9): ICD-10-CM

## 2024-09-23 DIAGNOSIS — R40.0 DAYTIME SOMNOLENCE: ICD-10-CM

## 2024-09-23 DIAGNOSIS — G47.00 INSOMNIA, UNSPECIFIED TYPE: ICD-10-CM

## 2024-09-23 DIAGNOSIS — D89.813 GVHD AS COMPLICATION OF BONE MARROW TRANSPLANT (H): ICD-10-CM

## 2024-09-23 DIAGNOSIS — G47.33 OSA (OBSTRUCTIVE SLEEP APNEA): Primary | ICD-10-CM

## 2024-09-23 DIAGNOSIS — T86.09 GVHD AS COMPLICATION OF BONE MARROW TRANSPLANT (H): ICD-10-CM

## 2024-09-23 DIAGNOSIS — Z86.69 HISTORY OF SLEEP APNEA: ICD-10-CM

## 2024-09-23 PROCEDURE — 99205 OFFICE O/P NEW HI 60 MIN: CPT | Performed by: NURSE PRACTITIONER

## 2024-09-23 RX ORDER — LORAZEPAM 1 MG/1
1 TABLET ORAL EVERY 6 HOURS PRN
COMMUNITY

## 2024-09-23 NOTE — PATIENT INSTRUCTIONS
"          MY TREATMENT INFORMATION FOR SLEEP APNEA-  Nik Nava    DOCTOR : PEE Walker CNP    Am I having a sleep study at a sleep center?  --->Due to normal delays, you will be contacted within 2-4 weeks to schedule    Am I having a home sleep study?  --->Watch the video for the device you are using:    -/drop off device-   https://www.Mobifusionube.com/watch?v=yGGFBdELGhk    -Disposable device sent out require phone/computer application-   https://www.Tropic Networks.com/watch?v=BCce_vbiwxE      Frequently asked questions:  1. What is Obstructive Sleep Apnea (KISHAN)? KISHAN is the most common type of sleep apnea. Apnea means, \"without breath.\"  Apnea is most often caused by narrowing or collapse of the upper airway as muscles relax during sleep.   Almost everyone has occasional apneas. Most people with sleep apnea have had brief interruptions at night frequently for many years.  The severity of sleep apnea is related to how frequent and severe the events are.   2. What are the consequences of KISHAN? Symptoms include: feeling sleepy during the day, snoring loudly, gasping or stopping of breathing, trouble sleeping, and occasionally morning headaches or heartburn at night.  Sleepiness can be serious and even increase the risk of falling asleep while driving. Other health consequences may include development of high blood pressure and other cardiovascular disease in persons who are susceptible. Untreated KISHAN  can contribute to heart disease, stroke and diabetes.   3. What are the treatment options? In most situations, sleep apnea is a lifelong disease that must be managed with daily therapy. Medications are not effective for sleep apnea and surgery is generally not considered until other therapies have been tried. Your treatment is your choice . Continuous Positive Airway (CPAP) works right away and is the therapy that is effective in nearly everyone. An oral device to hold your jaw forward is usually the next most " reliable option. Other options include postioning devices (to keep you off your back), weight loss, and surgery including a tongue pacing device. There is more detail about some of these options below.  4. Are my sleep studies covered by insurance? Although we will request verification of coverage, we advise you also check in advance of the study to ensure there is coverage.    Important tips for those choosing CPAP and similar devices  REMEMBER-IF YOU RECEIVE A CALL FROM  [phone]-->IT IS TO SETUP A DEVICE  For new devices, sign up for device NAVIN to monitor your device for your followup visits  We encourage you to utilize the [employer] navin or website ( https://EndoInSight.[employer]/ ) to monitor your therapy progress and share the data with your healthcare team when you discuss your sleep apnea.                                                    Know your equipment:  CPAP is continuous positive airway pressure that prevents obstructive sleep apnea by keeping the throat from collapsing while you are sleeping. In most cases, the device is  smart  and can slowly self-adjusts if your throat collapses and keeps a record every day of how well you are treated-this information is available to you and your care team.  BPAP is bilevel positive airway pressure that keeps your throat open and also assists each breath with a pressure boost to maintain adequate breathing.  Special kinds of BPAP are used in patients who have inadequate breathing from lung or heart disease. In most cases, the device is  smart  and can slowly self-adjusts to assist breathing. Like CPAP, the device keeps a record of how well you are treated.  Your mask is your connection to the device. You get to choose what feels most comfortable and the staff will help to make sure if fits. Here: are some examples of the different masks that are available: Magnetic mask aids may assist with use but there are safety issues that should be addressed when  considering with magnets* ( see end of discussion).       Key points to remember on your journey with sleep apnea:  Sleep study.  PAP devices often need to be adjusted during a sleep study to show that they are effective and adjusted right.  Good tips to remember: Try wearing just the mask during a quiet time during the day so your body adapts to wearing it. A humidifier is recommended for comfort in most cases to prevent drying of your nose and throat. Allergy medication from your provider may help you if you are having nasal congestion.  Getting settled-in. It takes more than one night for most of us to get used to wearing a mask. Try wearing just the mask during a quiet time during the day so your body adapts to wearing it. A humidifier is recommended for comfort in most cases. Our team will work with you carefully on the first day and will be in contact within 4 days and again at 2 and 4 weeks for advice and remote device adjustments. Your therapy is evaluated by the device each day.   Use it every night. The more you are able to sleep naturally for 7-8 hours, the more likely you will have good sleep and to prevent health risks or symptoms from sleep apnea. Even if you use it 4 hours it helps. Occasionally all of us are unable to use a medical therapy, in sleep apnea, it is not dangerous to miss one night.   Communicate. Call our skilled team on the number provided on the first day if your visit for problems that make it difficult to wear the device. Over 2 out of 3 patients can learn to wear the device long-term with help from our team. Remember to call our team or your sleep providers if you are unable to wear the device as we may have other solutions for those who cannot adapt to mask CPAP therapy. It is recommended that you sleep your sleep provider within the first 3 months and yearly after that if you are not having problems.   Use it for your health. We encourage use of CPAP masks during daytime quiet  periods to allow your face and brain to adapt to the sensation of CPAP so that it will be a more natural sensation to awaken to at night or during naps. This can be very useful during the first few weeks or months of adapting to CPAP though it does not help medically to wear CPAP during wakefulness and  should not be used as a strategy just to meet guidelines.  Take care of your equipment. Make sure you clean your mask and tubing using directions every day and that your filter and mask are replaced as recommended or if they are not working.     *Masks with magnets:  Updated Contraindications  Masks with magnetic components are contraindicated for use by patients where they, or anyone in close physical contact while using the mask, have the following:   Active medical implants that interact with magnets (i.e., pacemakers, implantable cardioverter defibrillators (ICD), neurostimulators, cerebrospinal fluid (CSF) shunts, insulin/infusion pumps)   Metallic implants/objects containing ferromagnetic material (i.e., aneurysm clips/flow disruption devices, embolic coils, stents, valves, electrodes, implants to restore hearing or balance with implanted magnets, ocular implants, metallic splinters in the eye)  Updated Warning  Keep the mask magnets at a safe distance of at least 6 inches (150 mm) away from implants or medical devices that may be adversely affected by magnetic interference. This warning applies to you or anyone in close physical contact with your mask. The magnets are in the frame and lower headgear clips, with a magnetic field strength of up to 400mT. When worn, they connect to secure the mask but may inadvertently detach while asleep.  Implants/medical devices, including those listed within contraindications, may be adversely affected if they change function under external magnetic fields or contain ferromagnetic materials that attract/repel to magnetic fields (some metallic implants, e.g., contact lenses  with metal, dental implants, metallic cranial plates, screws, juan m hole covers, and bone substitute devices). Consult your physician and  of your implant / other medical device for information on the potential adverse effects of magnetic fields.    BESIDES CPAP, WHAT OTHER THERAPIES ARE THERE?    Positioning Device  Positioning devices are generally used when sleep apnea is mild and only occurs on your back.This example shows a pillow that straps around the waist. It may be appropriate for those whose sleep study shows milder sleep apnea that occurs primarily when lying flat on one's back. Preliminary studies have shown benefit but effectiveness at home may need to be verified by a home sleep test. These devices are generally not covered by medical insurance.  Examples of devices that maintain sleeping on the back to prevent snoring and mild sleep apnea.    Belt type body positioner  http://[employer]/    Electronic reminder  http://nightshifttherapy.[employer]/            Oral Appliance  What is oral appliance therapy?  An oral appliance device fits on your teeth at night like a retainer used after having braces. The device is made by a specialized dentist and requires several visits over 1-2 months before a manufactured device is made to fit your teeth and is adjusted to prevent your sleep apnea. Once an oral device is working properly, snoring should be improved. A home sleep test may be recommended at that time if to determine whether the sleep apnea is adequately treated.       Some things to remember:  -Oral devices are often, but not always, covered by your medical insurance. Be sure to check with your insurance provider.   -If you are referred for oral therapy, you will be given a list of specialized dentists to consider or you may choose to visit the Web site of the American Academy of Dental Sleep Medicine  -Oral devices are less likely to work if you have severe sleep apnea or are extremely  overweight.     More detailed information  An oral appliance is a small acrylic device that fits over the upper and lower teeth  (similar to a retainer or a mouth guard). This device slightly moves jaw forward, which moves the base of the tongue forward, opens the airway, improves breathing for effective treat snoring and obstructive sleep apnea in perhaps 7 out of 10 people .  The best working devices are custom-made by a dental device  after a mold is made of the teeth 1, 2, 3.  When is an oral appliance indicated?  Oral appliance therapy is recommended as a first-line treatment for patients with primary snoring, mild sleep apnea, and for patients with moderate sleep apnea who prefer appliance therapy to use of CPAP4, 5. Severity of sleep apnea is determined by sleep testing and is based on the number of respiratory events per hour of sleep.   How successful is oral appliance therapy?  The success rate of oral appliance therapy in patients with mild sleep apnea is 75-80% while in patients with moderate sleep apnea it is 50-70%. The chance of success in patients with severe sleep apnea is 40-50%. The research also shows that oral appliances have a beneficial effect on the cardiovascular health of KISHAN patients at the same magnitude as CPAP therapy7.  Oral appliances should be a second-line treatment in cases of severe sleep apnea, but if not completely successful then a combination therapy utilizing CPAP plus oral appliance therapy may be effective. Oral appliances tend to be effective in a broad range of patients although studies show that the patients who have the highest success are females, younger patients, those with milder disease, and less severe obesity. 3, 6.   Finding a dentist that practices dental sleep medicine  Specific training is available through the American Academy of Dental Sleep Medicine for dentists interested in working in the field of sleep. To find a dentist who is educated in  the field of sleep and the use of oral appliances, near you, visit the Web site of the American Academy of Dental Sleep Medicine.    References  1. Marcial, et al. Objectively measured vs self-reported compliance during oral appliance therapy for sleep-disordered breathing. Chest 2013; 144(5): 8221-7011.  2. Christopher et al. Objective measurement of compliance during oral appliance therapy for sleep-disordered breathing. Thorax 2013; 68(1): 91-96.  3. Zaira et al. Mandibular advancement devices in 620 men and women with KISHAN and snoring: tolerability and predictors of treatment success. Chest 2004; 125: 9778-3040.  4. Miya, et al. Oral appliances for snoring and KISHAN: a review. Sleep 2006; 29: 244-262.  5. Abdon et al. Oral appliance treatment for KISHAN: an update. J Clin Sleep Med 2014; 10(2): 215-227.  6. Garett et al. Predictors of OSAH treatment outcome. J Dent Res 2007; 86: 7493-6495.      Weight Loss:   Your Body mass index is 34.58 kg/m .    Being overweight does not necessarily mean you will have health consequences.  Those who have BMI over 35 or over 27 with existing medical conditions carries greater risk.   Weight loss decreases severity of sleep apnea in most people with obesity. For those with mild obesity who have developed snoring with weight gain, even 15-30 pound weight loss can improve and occasionally milder eliminate sleep apnea.  Structured and life-long dietary and health habits are necessary to lose weight and keep healthier weight levels.     The Comprehensive Weight loss program offers all aspects of weight loss strategies including two Non-Surgical Weight Loss Programs: Medical Weight Management and our 24 Week Healthy Lifestyle Program:    Medical Weight Management: You will meet with a Medical Weight Management Provider, as well as a Registered Dietician. The program may include medication therapy, dietary education, recommended exercise and physical therapy programs,  monthly support group meetings, and possible psychological counseling. Follow up visits with the provider or dietician are scheduled based on your progress and needs.    24 Week Healthy Lifestyle Program: This unique program is designed to give you the support of weekly appointments and activities thru a 24-week period. It may include all of the components of the basic program (above), with the addition of 11 individual Health  Visits, 24-week access to the [employer] website for over 700 online classes, and monthly support group meetings. This program has an out-of-pocket expense of $499 to cover the items that can not be billed to insurance (health coaches and [employer] access), and is non-refundable/non-transferable (you may be able to use a Health Savings Account; ask your HSA provider). There may be an optional meal replacement plan prescribed as well.   Surgical management achieves meaningful long-term weight loss and improvement in health risks in most patients with more severe obesity.      Sleep Apnea Surgery:    Surgery for obstructive sleep apnea is considered generally only when other therapies fail to work. Surgery may be discussed with you if you are having a difficult time tolerating CPAP and or when there is an abnormal structure that requires surgical correction.  Nose and throat surgeries often enlarge the airway to prevent collapse.  Most of these surgeries create pain for 1-2 weeks and up to half of the most common surgeries are not effective throughout life.  You should carefully discuss the benefits and drawbacks to surgery with your sleep provider and surgeon to determine if it is the best solution for you.   More information  Surgery for KISHAN is directed at areas that are responsible for narrowing or complete obstruction of the airway during sleep.  There are a wide range of procedures available to enlarge and/or stabilize the airway to prevent blockage of breathing in the three major  areas where it can occur: the palate, tongue, and nasal regions.  Successful surgical treatment depends on the accurate identification of the factors responsible for obstructive sleep apnea in each person.  A personalized approach is required because there is no single treatment that works well for everyone.  Because of anatomic variation, consultation with an examination by a sleep surgeon is a critical first step in determining what surgical options are best for each patient.  In some cases, examination during sedation may be recommended in order to guide the selection of procedures.  Patients will be counseled about risks and benefits as well as the typical recovery course after surgery. Surgery is typically not a cure for a person s KISHAN.  However, surgery will often significantly improve one s KISHAN severity (termed  success rate ).  Even in the absence of a cure, surgery will decrease the cardiovascular risk associated with OSA7; improve overall quality of life8 (sleepiness, functionality, sleep quality, etc).      Palate Procedures:  Patients with KISHAN often have narrowing of their airway in the region of their tonsils and uvula.  The goals of palate procedures are to widen the airway in this region as well as to help the tissues resist collapse.  Modern palate procedure techniques focus on tissue conservation and soft tissue rearrangement, rather than tissue removal.  Often the uvula is preserved in this procedure. Residual sleep apnea is common in patient after pharyngoplasty with an average reduction in sleep apnea events of 33%2.      Tongue Procedures:  ExamWhile patients are awake, the muscles that surround the throat are active and keep this region open for breathing. These muscles relax during sleep, allowing the tongue and other structures to collapse and block breathing.  There are several different tongue procedures available.  Selection of a tongue base procedure depends on characteristics seen on  physical exam.  Generally, procedures are aimed at removing bulky tissues in this area or preventing the back of the tongue from falling back during sleep.  Success rates for tongue surgery range from 50-62%3.    Hypoglossal Nerve Stimulation:  Hypoglossal nerve stimulation has recently received approval from the [place] Food and Drug Administration for the treatment of obstructive sleep apnea.  This is based on research showing that the system was safe and effective in treating sleep apnea6.  Results showed that the median AHI score decreased 68%, from 29.3 to 9.0. This therapy uses an implant system that senses breathing patterns and delivers mild stimulation to airway muscles, which keeps the airway open during sleep.  The system consists of three fully implanted components: a small generator (similar in size to a pacemaker), a breathing sensor, and a stimulation lead.  Using a small handheld remote, a patient turns the therapy on before bed and off upon awakening.    Candidates for this device must be greater than 18 years of age, have moderate to severe obstructive sleep apnea with less than 25% central events  (AHI between 15-65), BMI less than 35, have tried CPAP/oral appliance for at least 8 weeks without success, and have appropriate upper airway anatomy (determined by a sleep endoscopy performed by Dr. Dereck Conley or Dr. Akin John).    Nasal Procedures:  Nasal obstruction can interfere with nasal breathing during the day and night.  Studies have shown that relief of nasal obstruction can improve the ability of some patients to tolerate positive airway pressure therapy for obstructive sleep apnea1.  Treatment options include medications such as nasal saline, topical corticosteroid and antihistamine sprays, and oral medications such as antihistamines or decongestants. Non-surgical treatments can include external nasal dilators for selected patients. If these are not successful by themselves,  surgery can improve the nasal airway either alone or in combination with these other options.        Combination Procedures:  Combination of surgical procedures and other treatments may be recommended, particularly if patients have more than one area of narrowing or persistent positional disease.  The success rate of combination surgery ranges from 66-80%2,3.    References  Zamzam TAYLOR. The Role of the Nose in Snoring and Obstructive Sleep Apnoea: An Update.  Eur Arch Otorhinolaryngol. 2011; 268: 1365-73.   Thomas SM; Suzan JA; Shell JR; Pallanch JF; Odilon MB; Aby SG; Roland JACOB. Surgical modifications of the upper airway for obstructive sleep apnea in adults: a systematic review and meta-analysis. SLEEP 2010;33(10):3182-1241. Federica MORILLO. Hypopharyngeal surgery in obstructive sleep apnea: an evidence-based medicine review.  Arch Otolaryngol Head Neck Surg. 2006 Feb;132(2):206-13.  Lan YH1, Elvin Y, [place] JONO. The efficacy of anatomically based multilevel surgery for obstructive sleep apnea. Otolaryngol Head Neck Surg. 2003 Oct;129(4):327-35.  Feedrica MORILLO, Goldberg A. Hypopharyngeal Surgery in Obstructive Sleep Apnea: An Evidence-Based Medicine Review. Arch Otolaryngol Head Neck Surg. 2006 Feb;132(2):206-13.  Loreta OLIVARES et al. Upper-Airway Stimulation for Obstructive Sleep Apnea.  N Engl J Med. 2014 Jan 9;370(2):139-49.  Samy Y et al. Increased Incidence of Cardiovascular Disease in Middle-aged Men with Obstructive Sleep Apnea. Am J Respir Crit Care Med; 2002 166: 159-165  Galarza EM et al. Studying Life Effects and Effectiveness of Palatopharyngoplasty (SLEEP) study: Subjective Outcomes of Isolated Uvulopalatopharyngoplasty. Otolaryngol Head Neck Surg. 2011; 144: 623-631.        WHAT IF I ONLY HAVE SNORING?    Mandibular advancement devices, lateral sleep positioning, long-term weight loss and treatment of nasal allergies have been shown to improve snoring.  Exercising tongue muscles with a game  (https://SourceClear.[employer]/us/navin/soundly-reduce-snoring/ys7204472830) or stimulating the tongue during the day with a device (https://doi.org/10.3390/msg76659798) have improved snoring in some individuals.  https://www.23press.[employer]/  https://www.sleepfoundation.org/best-anti-snoring-mouthpieces-and-mouthguards    Remember to Drive Safe... Drive Alive     Sleep health profoundly affects your health, mood, and your safety.  Thirty three percent of the population (one in three of us) is not getting enough sleep and many have a sleep disorder. Not getting enough sleep or having an untreated / undertreated sleep condition may make us sleepy without even knowing it. In fact, our driving could be dramatically impaired due to our sleep health. As your provider, here are some things I would like you to know about driving:     Here are some warning signs for impairment and dangerous drowsy driving:              -Having been awake more than 16 hours               -Looking tired               -Eyelid drooping              -Head nodding (it could be too late at this point)              -Driving for more than 30 minutes     Some things you could do to make the driving safer if you are experiencing some drowsiness:              -Stop driving and rest              -Call for transportation              -Make sure your sleep disorder is adequately treated     Some things that have been shown NOT to work when experiencing drowsiness while driving:              -Turning on the radio              -Opening windows              -Eating any  distracting  /  entertaining  foods (e.g., sunflower seeds, candy, or any other)              -Talking on the phone      Your decision may not only impact your life, but also the life of others. Please, remember to drive safe for yourself and all of us.

## 2024-09-23 NOTE — PROGRESS NOTES
Outpatient Sleep Medicine Consultation:      Name: Nik Nava MRN# [id number]   Age: 64 year old YOB: 1960     Date of Consultation: September 23, 2024  Consultation is requested by: Chris Cote MD  [address] [facility] [address]  [place] [address] Chris Cote  Primary care provider: Wojciech Soares       Reason for Sleep Consult:     Nik Nava is sent by Chris Coet for a sleep consultation regarding hx KISHAN, re-evaluation.    Patient s Reason for visit  Nik Nava main reason for visit: Not sleeping well  Patient states problem(s) started: 20 years  Nik Nava's goals for this visit: Get restfull sleep           Assessment and Plan:     Summary Sleep Diagnoses: (Addended 10/3/2024 - TW)  1. KISHAN (obstructive sleep apnea)  2. Loud snoring  3. History of sleep apnea  4. 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 Daytime somnolence  6. Insomnia, unspecified type  7. Obesity (BMI 30.0-34.9)  - Adult Sleep Eval & Management Referral  - HST-Home Sleep Apnea Test - Noxturnal Returnable; Future  - HST-Home Sleep Apnea Test - Noxturnal Returnable; Future      Comorbid Diagnoses:  ALL-in remission  HTN  Hypothyroidism  Hypogammaglobulin anemia  CKD stage IIIa  History of renal cell CA-s/p nephrectomy  Previous history of PE  Chronically immunosuppressed  Prediabetes      Summary Recommendations:  Recommend reevaluation with home sleep apnea testing (HST) for presence of for current severity of KISHAN.  Patient reports previous sleep testing in 2003 at an outside sleep center and was treated with CPAP for approximately 6 months when he underwent septoplasty for deviated nasal septum and his symptoms largely resolved.  Most recently, he reports significant weight gain due to prednisone use for adrenocortical insufficiency and history of GVHD.  He reports symptoms of loud snoring, witnessed apneas, daytime somnolence, difficulty staying asleep, problems getting back to sleep  once awakened, getting up to urinate more than once, and nonrestorative sleep for several years.  His STOP-BANG score is 7-8 today which suggests a high risk of KISHAN.  His symptoms are consistent with probable obstructive sleep apnea.  We discussed the pathophysiology of obstructive sleep apnea and the risks associated with untreated KISHAN.  We also discussed the pros and cons of in lab polysomnography versus home sleep testing.  The patient has elected to undergo home sleep testing (HST) to further evaluate his symptoms of possible obstructive sleep apnea.  All potential therapeutic options including positive airway pressure, mandibular advancing oral appliances, positional therapy, and surgical options were discussed. Also counseled about impact of weight loss on KISHAN.  If CPAP is recommended, he would need a new APAP device and would recommend trial of nasal pillow mask for comfort/compliance.  Follow up after the sleep study to review the results and to determine next steps or may elect to receive results via [employer] message and consideration for treatment options can be determined at that time.    Orders Placed This Encounter   Procedures    HST-Home Sleep Apnea Test - Noxturnal Returnable         Summary Counseling:    Sleep Testing Reviewed  Obstructive Sleep Apnea Reviewed  Complications of Untreated Sleep Apnea Reviewed  Previous recent chart notes, lab, imaging, PFT, and cardiology results reviewed    Medical Decision-making:   Educational materials provided in instructions    Total time spent reviewing medical records, history and physical examination, review of previous testing and interpretation as well as documentation on this date: 60 minutes    CC: Chris Cote          History of Present Illness:     Nik Nava is a 64-year-old male with a PMH significant for 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  He was referred by his hematologist for further evaluation/management of KISHAN.      Past Sleep Evaluations: 12/15/2014 visit with Dr. Carrillo - He was diagnosed with sleep apnea in 2003 and was prescribed CPAP machine but stopped using it after 6 months due to unknown reason after which he underwent surgery for deviated nasal septum and noted improvement in his symptoms.     SLEEP-WAKE SCHEDULE:     Work/School Days: Patient goes to school/work: No   Usually gets into bed at 10pm  Takes patient about 10 min to fall asleep  Has trouble falling asleep 0 nights per week  Wakes up in the middle of the night 3-4 times.  Wakes up due to Uncertain  He has trouble falling back asleep 2-3 times a week.   It usually takes 1 hour to get back to sleep  Patient is usually up at 7am  Uses alarm: No    Weekends/Non-work Days/All Other Days:  Usually gets into bed at 10pm   Takes patient about 10min to fall asleep  Patient is usually up at 7am  Uses alarm: No    Sleep Need  Patient gets  6-7 hours sleep on average   Patient thinks he needs about 8-9 hours sleep    Nik Nava prefers to sleep in this position(s): Side   Patient states they do the following activities in bed: Watch TV    Naps  Patient takes a purposeful nap 5 times a week and naps are usually 1-2 hours in duration  He feels better after a nap: Yes  He dozes off unintentionally 0 days per week  Patient has had a driving accident or near-miss due to sleepiness/drowsiness:  No      SLEEP DISRUPTIONS:    Breathing/Snoring  Patient snores:Yes  Other people complain about his snoring: Yes  Patient has been told he stops breathing in his sleep:Yes  He has issues with the following: Morning mouth dryness;Getting up to urinate more than once    Movement:  Patient gets pain, discomfort, with an urge to move:  No  It happens when he is resting:     It happens more at night:     Patient has been told he kicks his legs at night:  No     Behaviors in Sleep:  Nik Nava has experienced the following behaviors while sleeping: Recurring Nightmares  He has experienced sudden muscle weakness during the day: No      Is there anything else you would like your sleep provider to know:        CAFFEINE AND OTHER SUBSTANCES:    Patient consumes caffeinated beverages per day:  1 coffee  Last caffeine use is usually: Morning  List of any prescribed or over the counter stimulants that patient takes: Prednisone  List of any prescribed or over the counter sleep medication patient takes: I will bring a list  List of previous sleep medications that patient has tried: Lorazapan  Patient drinks alcohol to help them sleep: No  Patient drinks alcohol near bedtime: No    Alcohol use: No  Nicotine/tobacco use: quit smoking - - chews nicorette gum  Recreational drug use: None      Family History:  Patient has a family member been diagnosed with a sleep disorder: Yes  Myself and father         SCALES:    EPWORTH SLEEPINESS SCALE         9/22/2024     4:45 AM    [place] Sleepiness Scale ( LORETTA Elmore  5399-6363<br>ESS - [place]/English - Final version - 21 Nov 07 - [facility] [place].)   Sitting and reading Slight chance of dozing   Watching TV Moderate chance of dozing   Sitting, inactive in a public place (e.g. a theatre or a meeting) Slight chance of dozing   As a passenger in a car for an hour without a break Slight chance of dozing   Lying down to rest in the afternoon when circumstances permit High chance of  dozing   Sitting and talking to someone Would never doze   Sitting quietly after a lunch without alcohol Slight chance of dozing   In a car, while stopped for a few minutes in traffic Would never doze   [place] Score (MC) 9   [place] Score (Sleep) 9         INSOMNIA SEVERITY INDEX (EDGARDO)          9/22/2024     4:28 AM   Insomnia Severity Index (EDGARDO)   Difficulty falling asleep 0   Difficulty staying asleep 2   Problems waking up too early 2   How SATISFIED/DISSATISFIED are you with your CURRENT sleep pattern? 3   How NOTICEABLE to others do you think your sleep problem is in terms of impairing the quality of your life? 2   How WORRIED/DISTRESSED are you about your current sleep problem? 2   To what extent do you consider your sleep problem to INTERFERE with your daily functioning (e.g. daytime fatigue, mood, ability to function at work/daily chores, concentration, memory, mood, etc.) CURRENTLY? 3   EDGARDO Total Score 14       Guidelines for Scoring/Interpretation:  Total score categories:  0-7 = No clinically significant insomnia   8-14 = Subthreshold insomnia   15-21 = Clinical insomnia (moderate severity)  22-28 = Clinical insomnia (severe)  Used via courtesy of www.[employer]elligentE[employer].comth.va.gov with permission from Cheng Bunch PhD., [facility]      STOP BANG score: 8        9/23/2024    10:00 AM   STOP BANG Questionnaire (  2008, the American Society of Anesthesiologists, Inc. [employer]   Neck Cir (cm) Clinic: 47 cm   B/P Clinic: 120/81   BMI Clinic: 34.58         GAD7        7/9/2021     3:40 PM   CHACE-7    1. Feeling nervous, anxious, or on edge 1   2. Not being able to stop or control worrying 0   3. Worrying too much about different things 1   4. Trouble relaxing 0   5. Being so restless that it is hard to sit still 0   6. Becoming easily annoyed or irritable 1   7. Feeling afraid, as if something awful might happen 1   CHACE-7 Total Score 4         CAGE-AID         No data to display        "         CAGE-AID reprinted with permission from the [place] Medical Journal, MADDY Danielle. and ZARA Simpson, \"Conjoint screening questionnaires for alcohol and drug abuse\" [place] Medical Journal 94: 135-140, 1995.      PATIENT HEALTH QUESTIONNAIRE-9 (PHQ - 9)        7/9/2021     3:40 PM   PHQ-9 ([employer])   1.  Little interest or pleasure in doing things 0   2.  Feeling down, depressed, or hopeless 0   3.  Trouble falling or staying asleep, or sleeping too much 0   4.  Feeling tired or having little energy 3   5.  Poor appetite or overeating 0   6.  Feeling bad about yourself - or that you are a failure or have let yourself or your family down 0   7.  Trouble concentrating on things, such as reading the newspaper or watching television 2   8.  Moving or speaking so slowly that other people could have noticed. Or the opposite - being so fidgety or restless that you have been moving around a lot more than usual 0   9.  Thoughts that you would be better off dead, or of hurting yourself in some way 0   PHQ-9 Total Score 5   6.  Feeling bad about yourself 0   7.  Trouble concentrating 2   8.  Moving slowly or restless 0   9.  Suicidal or self-harm thoughts 0       Developed by Vonda Ojeda, Ewelina Tran, Mario Alberto Lewis and colleagues, with an educational aziza from [employer] No permission required to reproduce, translate, display or distribute.        Allergies:    Allergies   Allergen Reactions    Sulfamethoxazole-Trimethoprim Rash       Medications:    Current Outpatient Medications   Medication Sig Dispense Refill    acyclovir (ZOVIRAX) 400 MG tablet Take 2 tablets (800 mg) by mouth every 12 hours 360 tablet 3    amLODIPine (NORVASC) 5 MG tablet Take 1 tablet (5 mg) by mouth daily 90 tablet 3    Carboxymethylcell-Glycerin PF (REFRESH RELIEVA PF) 0.5-1 % SOLN Apply 1 drop to eye 4 times daily Preservative free. Either PF multidose bottle or PF vials 30 each 11    dexAMETHasone alcohol-free (DECADRON) " 0.1 MG/ML solution Swish and spit 10 mLs (1 mg) in mouth 2 times daily. 500 mL 2    escitalopram (LEXAPRO) 10 MG tablet Take 1 tablet (10 mg) by mouth daily 30 tablet 3    fluorometholone (FML LIQUIFILM) 0.1 % ophthalmic suspension Place 1 drop Into the left eye 3 times daily. 5 mL 1    levothyroxine (SYNTHROID/LEVOTHROID) 112 MCG tablet Take 1 tablet (112 mcg) by mouth daily 90 tablet 4    LORazepam (ATIVAN) 1 MG tablet Take 1 mg by mouth every 6 hours as needed for anxiety. PRN      metroNIDAZOLE (METROCREAM) 0.75 % external cream Apply topically 2 times daily Apply to the nose. 45 g 3    nicotine polacrilex (NICORETTE) 4 MG gum Place 4 mg inside cheek daily.      Nutritional Supplements (ENSURE COMPLETE SHAKE) LIQD Take 11 mLs by mouth every morning      omeprazole (PRILOSEC) 40 MG DR capsule Take 1 capsule (40 mg) by mouth daily 30 capsule 3    penicillin V (VEETID) 250 MG tablet Take 1 Tablet (250 mg) by mouth two times daily. 60 tablet 1    predniSONE (DELTASONE) 10 MG tablet Take 1 tablet (10 mg) by mouth daily 90 tablet 2    predniSONE (DELTASONE) 5 MG tablet Take 1 tablet (5 mg) by mouth daily. Currently taking 15 mg daily ( on taper) 30 tablet 1    rosuvastatin (CRESTOR) 5 MG tablet Take 1 tablet (5 mg) by mouth daily 30 tablet 3    sennosides (SENOKOT) 8.6 MG tablet Take 1 tablet by mouth 3 times daily as needed for constipation 90 tablet 0    sodium chloride 0.9 % neb solution 3 mLs by Other route 2 times daily 300 mL 11    PREVIDENT 5000 BOOSTER PLUS 1.1 % PSTE dental paste  (Patient not taking: Reported on 8/5/2024)      tacrolimus (PROTOPIC) 0.1 % external ointment Apply topically 2 times daily (Patient not taking: Reported on 4/12/2024) 30 g 1       Problem List:  Patient Active Problem List    Diagnosis Date Noted    Bacterial pneumonia 08/26/2024     Priority: Medium    Edema of lower extremity 08/26/2024     Priority: Medium    Hyponatremia 08/26/2024     Priority: Medium    Other specified  hypothyroidism 08/26/2024     Priority: Medium    Pulmonary embolism (H) 08/26/2024     Priority: Medium    Immunocompromised (H24) 06/04/2024     Priority: Medium     Formatting of this note might be different from the original.   Has had multiple hospitalizations for infection due to immunocompromised state   2024 - RSV, pneumonia   2023 - diskitis, osteomyelitis of the spine      Unspecified adrenocortical insufficiency (H24) 06/04/2024     Priority: Medium    Hypogammaglobulinemia (H24) 04/05/2024     Priority: Medium    RSV (acute bronchiolitis due to respiratory syncytial virus) 03/26/2024     Priority: Medium    Constipation 01/30/2024     Priority: Medium    Insomnia 01/30/2024     Priority: Medium    Peripheral nerve disease 01/30/2024     Priority: Medium    Anemia in stage 3a chronic kidney disease (H) 01/29/2024     Priority: Medium    Osteomyelitis of lumbar spine (H) 01/29/2024     Priority: Medium    Combined forms of age-related cataract of both eyes 12/05/2023     Priority: Medium    Dry eye 12/05/2023     Priority: Medium    Discitis of lumbar region 09/05/2023     Priority: Medium    Chronic, continuous use of opioids 09/03/2023     Priority: Medium    Postablative hypothyroidism 03/01/2023     Priority: Medium    On corticosteroid therapy 03/01/2023     Priority: Medium    History of Graves' disease 03/01/2023     Priority: Medium    Fatigue, unspecified type 03/01/2023     Priority: Medium    Shortness of breath 12/01/2022     Priority: Medium    Influenza A 12/01/2022     Priority: Medium    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) 10/12/2022     Priority: Medium    Generalized muscle weakness 04/26/2022     Priority: Medium    Musa-Barr virus viremia 04/16/2022     Priority: Medium    Status post bone marrow transplant (H) 08/23/2021     Priority: Medium     Added automatically from request for surgery [id number]      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) 07/02/2021      Priority: Medium    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) 07/02/2021     Priority: Medium    History of pulmonary embolism 04/03/2021     Priority: Medium    Hx of acute pancreatitis 03/20/2021     Priority: Medium    Prediabetes 01/11/2021     Priority: Medium    HTN (hypertension) 06/29/2020     Priority: Medium    History of primary malignant neoplasm of kidney 11/14/2007     Priority: Medium     Formatting of this note might be different from the original.   S/P right nephrectomy for renal cell cancer 10/07      Sleep apnea 10/22/2007     Priority: Medium     Formatting of this note might be different from the original.   Not on CPAP      Tobacco use disorder 10/22/2007     Priority: Medium     Formatting of this note might be different from the original.   Chew      Primary renal cell carcinoma of native right kidney (H) 10/07/2007     Priority: Medium        Past Medical/Surgical History:  Past Medical History:   Diagnosis Date    ALL (acute lymphocytic leukemia) (H)     CKD (chronic kidney disease)     GVHD (graft versus host disease) (H)     H/O peripheral stem cell transplant (H)     Hyperthyroidism 1996    Infection due to 2019 novel coronavirus 01/16/2023    Influenza A 11/2022    Postablative hypothyroidism 1997    Pulmonary embolism (H)     Renal cell carcinoma (H) 2007    right kidney    Sleep apnea      Past Surgical History:   Procedure Laterality Date    APPENDECTOMY      BACK SURGERY  2017    BONE MARROW BIOPSY, BONE SPECIMEN, NEEDLE/TROCAR Left 09/02/2021    Procedure: BIOPSY, BONE MARROW;  Surgeon: Jailyn Ny APRN CNP;  Location: [facility] OR    BONE MARROW BIOPSY, BONE SPECIMEN, NEEDLE/TROCAR Left 11/15/2021    Procedure: BIOPSY, BONE MARROW;  Surgeon: Socrates De La Torre;  Location: [facility] OR    BONE MARROW BIOPSY, BONE SPECIMEN, NEEDLE/TROCAR Left 02/07/2022    Procedure: BIOPSY, BONE MARROW;  Surgeon: Renetta Wiggins PA-C;  Location: [facility] OR    BONE MARROW BIOPSY,  BONE SPECIMEN, NEEDLE/TROCAR Left 2022    Procedure: BIOPSY, BONE MARROW;  Surgeon: Lorrie Yap PA-C;  Location: [facility] OR    BONE MARROW BIOPSY, BONE SPECIMEN, NEEDLE/TROCAR Left 2023    Procedure: Bone marrow biopsy, bone specimen, needle/trocar;  Surgeon: Teressa Rick PA-C;  Location: [facility] OR    BRONCHOSCOPY (RIGID OR FLEXIBLE), DIAGNOSTIC N/A 2022    Procedure: BRONCHOSCOPY, WITH BRONCHOALVEOLAR LAVAGE;  Surgeon: Murtaza Garcia MD;  Location: [facility]    IR CVC TUNNEL PLACEMENT > 5 YRS OF AGE  2021    IR CVC TUNNEL REMOVAL LEFT  2021    IR LIVER BIOPSY PERCUTANEOUS  2022    NEPHRECTOMY  2007    ORTHOPEDIC SURGERY      bilateral shoulder surgeries    PERCUTANEOUS BIOPSY LIVER N/A 2022    Procedure: NEEDLE BIOPSY, LIVER, PERCUTANEOUS;  Surgeon: Trace Castellanos MD;  Location: [facility] OR    PHACOEMULSIFICATION WITH STANDARD INTRAOCULAR LENS IMPLANT Left 1/15/2024    Procedure: LEFT EYE PHACOEMULSIFICATION, CATARACT, WITH STANDARD INTRAOCULAR LENS IMPLANT INSERTION;  Surgeon: Deshawn Menezes MD;  Location: [facility] OR    PHACOEMULSIFICATION WITH STANDARD INTRAOCULAR LENS IMPLANT Right 2024    Procedure: RIGHT EYE PHACOEMULSIFICATION, CATARACT, WITH STANDARD INTRAOCULAR LENS IMPLANT INSERTION;  Surgeon: Deshawn Menezes MD;  Location: [facility] OR       Social History:  Social History     Socioeconomic History    Marital status:      Spouse name: Not on file    Number of children: Not on file    Years of education: Not on file    Highest education level: Not on file   Occupational History    Not on file   Tobacco Use    Smoking status: Former     Current packs/day: 0.00     Average packs/day: 2.0 packs/day for 30.0 years (60.0 ttl pk-yrs)     Types: Cigarettes     Start date: 1989     Quit date: 2019     Years since quittin.3     Passive exposure: Past    Smokeless tobacco: Never    Tobacco comments:     DAILY USE OF NICORETTE GUM   Vaping Use     Vaping status: Never Used   Substance and Sexual Activity    Alcohol use: Not Currently    Drug use: Never    Sexual activity: Not on file   Other Topics Concern    Parent/sibling w/ CABG, MI or angioplasty before 65F 55M? Not Asked   Social History Narrative    Not on file     Social Determinants of Health     Financial Resource Strain: Low Risk  (4/7/2023)    [facility], [facility] [employer] [facility]    Financial Resource Strain     Difficulty of Paying Living Expenses: 3     Difficulty of Paying Living Expenses: Not on file   Food Insecurity: No Food Insecurity (4/7/2023)    Received from [facility], [facility] [employer] [facility]    Food Insecurity     Worried About Running Out of Food in the Last Year: 1   Transportation Needs: No Transportation Needs (4/7/2023)    Received from [facility], [facility]    Transportation Needs     Lack of Transportation (Medical): 1   Physical Activity: Not on file   Stress: Not on file   Social Connections: Socially Integrated (4/7/2023)    Received from [facility], [facility]    Social Connections     Frequency of Communication with Friends and Family: 0   Interpersonal Safety: Not At Risk (9/20/2022)    Humiliation, Afraid, Rape, and Kick questionnaire     Fear of Current or Ex-Partner: No     Emotionally Abused: No     Physically Abused: No     Sexually Abused: No   Housing Stability: Low Risk  (4/7/2023)    Received from [facility], [facility]    Housing Stability     Unable to Pay for Housing in the Last Year: 1       Family History:  Family History   Problem Relation Age of Onset    Lung Cancer Mother     Depression Mother     Polycythemia Father     Anesthesia  "Reaction Father         slow to awaken    Coronary Artery Disease Maternal Grandmother     Diabetes Maternal Grandmother     Hypertension Maternal Grandmother     Hypothyroidism Sister     Glaucoma No family hx of     Macular Degeneration No family hx of     Venous thrombosis No family hx of     Melanoma No family hx of     Skin Cancer No family hx of        Review of Systems:  A complete review of systems reviewed by me is negative with the exeption of what has been mentioned in the history of present illness.  In the last TWO WEEKS have you experienced any of the following symptoms?  Fevers: No  Night Sweats: No  Weight Gain: No  Pain at Night: No  Double Vision: No  Changes in Vision: No  Difficulty Breathing through Nose: Yes  Sore Throat in Morning: No  Dry Mouth in the Morning: Yes  Shortness of Breath Lying Flat: No  Shortness of Breath With Activity: No  Awakening with Shortness of Breath: No  Increased Cough: No  Heart Racing at Night: No  Swelling in Feet or Legs: Yes  Diarrhea at Night: No  Heartburn at Night: No  Urinating More than Once at Night: Yes  Losing Control of Urine at Night: No  Joint Pains at Night: No  Headaches in Morning: No  Weakness in Arms or Legs: No  Depressed Mood: No  Anxiety: Yes     Physical Examination:  Vitals: /81   Pulse 64   Ht 1.854 m (6' 1\")   Wt 118.9 kg (262 lb 1.6 oz)   SpO2 96%   BMI 34.58 kg/m    BMI= Body mass index is 34.58 kg/m .    Neck Cir (cm): 47 cm      GENERAL APPEARANCE: healthy, alert, no distress, and cooperative  EYES: Eyes grossly normal to inspection  RESP: Unlabored, easy breathing with normal conversational speech  CV: color normal  MS: extremities normal- no gross deformities noted  NEURO: Alert and oriented x 3, normal strength and tone, mentation intact, and speech normal  PSYCH: mentation appears normal and affect normal/bright  Mallampati Class: Deferred exam  Tonsillar Stage: Deferred exam         Data: All pertinent previous " "laboratory data reviewed     Recent Labs   Lab Test 09/09/24  0951 08/05/24  0938    139   POTASSIUM 4.6 4.1   CHLORIDE 102 104   CO2 25 24   ANIONGAP 12 11   * 111*   BUN 40.5* 37.7*   CR 1.25* 1.21*   DAMON 9.8 9.4       Recent Labs   Lab Test 09/09/24  0951   WBC 8.7   RBC 3.77*   HGB 12.8*   HCT 37.2*   MCV 99   MCH 34.0*   MCHC 34.4   RDW 15.4*          Recent Labs   Lab Test 09/09/24  0951   PROTTOTAL 7.0   ALBUMIN 4.3   BILITOTAL 0.7   ALKPHOS 91   AST 35   ALT 27       TSH   Date Value   05/09/2024 1.32 uIU/mL   02/20/2024 0.33 uIU/mL   04/06/2022 0.88 mU/L   11/18/2021 0.23 mU/L (L)       No results found for: \"UAMP\", \"UBARB\", \"BENZODIAZEUR\", \"UCANN\", \"UCOC\", \"OPIT\", \"UPCP\"    Iron Sat Index   Date/Time Value Ref Range Status   04/02/2024 01:45 PM 44 15 - 46 % Final     Ferritin   Date/Time Value Ref Range Status   04/02/2024 01:45 PM 1,597 (H) 31 - 409 ng/mL Final   07/08/2021 10:55  (H) 26 - 388 ng/mL Final       No results found for: \"PH\", \"PHARTERIAL\", \"PO2\", \"OJ1QEGXUJTE\", \"SAT\", \"PCO2\", \"HCO3\", \"BASEEXCESS\", \"DALILA\", \"BEB\"    @LABRCNTIPR(phv:4,pco2v:4,po2v:4,hco3v:4,bashir:4,o2per:4)@    Echocardiology: No results found for this or any previous visit (from the past 4320 hour(s)).    Chest x-ray:   XR CHEST B READ 2 VIEWS 02/09/2024    Narrative  For Patients: As a result of the 21st Century Cures Act, medical imaging exams and procedure reports are released immediately into your electronic medical record. You may view this report before your referring provider. If you have questions, please contact your health care provider.    EXAM: XR CHEST 2 VIEWS PA AND LATERAL  LOCATION: [facility]  DATE: 2/9/2024    INDICATION: Acute Cough  COMPARISON: 11/23/2022.    Impression  No acute findings. Clear lungs. Unchanged right Port-A-Cath. Normal heart size.      Chest CT:   CT Chest/Abdomen/Pelvis w Contrast 05/28/2024    Narrative  For Patients: As a result of the 21st Century Cures Act, " medical imaging exams and procedure reports are released immediately into your electronic medical record. You may view this report before your referring provider. If you have questions, please contact your health care provider.    EXAM: CT CHEST PE STUDY  LOCATION: [facility]  DATE: 5/28/2024    INDICATION: Fever, hypoxia, abnormal D-dimer levels.  COMPARISON: 1/30/2024.  TECHNIQUE: CT chest pulmonary angiogram during arterial phase injection of IV contrast. Multiplanar reformats and MIP reconstructions were performed. Dose reduction techniques were used.  CONTRAST: Omnipaque 350 50 mL.    FINDINGS:  ANGIOGRAM CHEST: Pulmonary arteries are normal caliber and negative for pulmonary emboli. Thoracic aorta is not well opacified and is  indeterminate for dissection. No CT evidence of right heart strain.    LUNGS AND PLEURA: Small centrilobular foci of groundglass infiltrate in the bilateral upper lobes, compatible with infectious or inflammatory pneumonitis. Findings nonspecific for organism, typical and atypical agents could cause this appearance. No overt airspace consolidation. The right middle lobe and bilateral lower lobes are clear. Airways are patent. No pleural effusion.    MEDIASTINUM/AXILLAE: Right chest wall subcutaneous medication infusion port with IJ central catheter in place with standard positioning and appearance. Normal heart size. No pericardial effusion. No mediastinal or hilar lymphadenopathy. Normal trachea and central bronchi. Normal esophagus.    CORONARY ARTERY CALCIFICATION: Moderate.    UPPER ABDOMEN: Lobular and nodular liver surface contour. No discrete mass. Surgical clips in the perihepatic space posteriorly and nonvisualized right adrenal gland. Normal left adrenal gland. Severe atrophy of the spleen. Otherwise negative.    MUSCULOSKELETAL: Normal.    Impression  1.  Negative for pulmonary embolism.    2.  Centrilobular groundglass nodules in the bilateral upper lobes, compatible with  "developing infectious or inflammatory pneumonitis. Findings nonspecific for etiology, typical of bacterial pneumonia or atypical agents could cause this appearance. No overt airspace consolidation. No pleural effusion.    3.  Right chest wall subcutaneous medication infusion port in place without complication.    4.  Surgical clips in the right upper abdomen and absent adrenal gland.    5.  Lobular liver surface contour without a discrete mass.      PFT: Most Recent [facility] Pulmonary Function Testing    FVC-Pred   Date Value Ref Range Status   09/21/2023 4.51 L      FVC-Pre   Date Value Ref Range Status   09/21/2023 4.48 L      FVC-%Pred-Pre   Date Value Ref Range Status   09/21/2023 99 %      FEV1-Pre   Date Value Ref Range Status   09/21/2023 3.17 L      FEV1-%Pred-Pre   Date Value Ref Range Status   09/21/2023 91 %      FEV1FVC-Pred   Date Value Ref Range Status   09/21/2023 77 %      FEV1FVC-Pre   Date Value Ref Range Status   09/21/2023 71 %      No results found for: \"20029\"  FEFMax-Pred   Date Value Ref Range Status   09/21/2023 9.48 L/sec      FEFMax-Pre   Date Value Ref Range Status   09/21/2023 9.73 L/sec      FEFMax-%Pred-Pre   Date Value Ref Range Status   09/21/2023 102 %      ExpTime-Pre   Date Value Ref Range Status   09/21/2023 9.67 sec      FIFMax-Pre   Date Value Ref Range Status   09/21/2023 6.55 L/sec      FEV1FEV6-Pred   Date Value Ref Range Status   09/21/2023 78 %      FEV1FEV6-Pre   Date Value Ref Range Status   09/21/2023 74 %      Narrative  Performed by: [facility]  The FVC, FEV1, FEV1/FVC ratio and YDT57-98% are within normal limits.  The diffusing capacity is normal.  However, the diffusing capacity was not corrected for the patient's hemoglobin.    IMPRESSION:    Normal spirometry.    Normal diffusion capacity.         Jerrod Carroll, PEE CNP 9/23/2024   Sleep Medicine      This note was written with the assistance of the Dragon voice-dictation technology software. The final document, " although reviewed, may contain errors. For corrections, please contact the office.

## 2024-09-25 ENCOUNTER — MYC REFILL (OUTPATIENT)
Dept: TRANSPLANT | Facility: CLINIC | Age: 64
End: 2024-09-25
Payer: MEDICARE

## 2024-09-25 DIAGNOSIS — E78.2 MIXED HYPERLIPIDEMIA: ICD-10-CM

## 2024-09-25 RX ORDER — ROSUVASTATIN CALCIUM 5 MG/1
5 TABLET, COATED ORAL DAILY
Qty: 30 TABLET | Refills: 3 | Status: SHIPPED | OUTPATIENT
Start: 2024-09-25

## 2024-09-25 RX ORDER — ROSUVASTATIN CALCIUM 5 MG/1
5 TABLET, COATED ORAL DAILY
Qty: 30 TABLET | Refills: 3 | OUTPATIENT
Start: 2024-09-25

## 2024-09-30 ENCOUNTER — MYC REFILL (OUTPATIENT)
Dept: TRANSPLANT | Facility: CLINIC | Age: 64
End: 2024-09-30
Payer: MEDICARE

## 2024-09-30 DIAGNOSIS — C91.01 ACUTE LYMPHOBLASTIC LEUKEMIA (ALL) IN REMISSION (H): ICD-10-CM

## 2024-09-30 DIAGNOSIS — T86.09 GVHD AS COMPLICATION OF BONE MARROW TRANSPLANT (H): ICD-10-CM

## 2024-09-30 DIAGNOSIS — D89.813 GVHD AS COMPLICATION OF BONE MARROW TRANSPLANT (H): ICD-10-CM

## 2024-09-30 RX ORDER — PREDNISONE 5 MG/1
5 TABLET ORAL DAILY
Qty: 90 TABLET | Refills: 1 | Status: SHIPPED | OUTPATIENT
Start: 2024-09-30

## 2024-10-01 RX ORDER — PENICILLIN V POTASSIUM 250 MG/1
TABLET, FILM COATED ORAL
Qty: 60 TABLET | Refills: 1 | Status: SHIPPED | OUTPATIENT
Start: 2024-10-01

## 2024-10-11 ENCOUNTER — OFFICE VISIT (OUTPATIENT)
Dept: OPHTHALMOLOGY | Facility: CLINIC | Age: 64
End: 2024-10-11
Payer: MEDICARE

## 2024-10-11 DIAGNOSIS — T86.09 GVHD AS COMPLICATION OF BONE MARROW TRANSPLANT (H): ICD-10-CM

## 2024-10-11 DIAGNOSIS — H04.129 DRY EYE: Primary | ICD-10-CM

## 2024-10-11 DIAGNOSIS — Z96.1 PSEUDOPHAKIA, BOTH EYES: ICD-10-CM

## 2024-10-11 DIAGNOSIS — D89.813 GVHD AS COMPLICATION OF BONE MARROW TRANSPLANT (H): ICD-10-CM

## 2024-10-11 PROCEDURE — 99213 OFFICE O/P EST LOW 20 MIN: CPT | Performed by: OPTOMETRIST

## 2024-10-11 ASSESSMENT — VISUAL ACUITY
METHOD: SNELLEN - LINEAR
VA_OR_OS_CURRENT_RX: 20/20
VA_OR_OD_CURRENT_RX: 20/20
OD_CC: 20/20
CORRECTION_TYPE: CONTACTS
OS_CC: 20/20

## 2024-10-11 ASSESSMENT — CONF VISUAL FIELD
OS_SUPERIOR_NASAL_RESTRICTION: 0
METHOD: COUNTING FINGERS
OD_INFERIOR_NASAL_RESTRICTION: 0
OD_INFERIOR_TEMPORAL_RESTRICTION: 0
OS_SUPERIOR_TEMPORAL_RESTRICTION: 0
OD_SUPERIOR_NASAL_RESTRICTION: 0
OS_INFERIOR_NASAL_RESTRICTION: 0
OD_SUPERIOR_TEMPORAL_RESTRICTION: 0
OS_INFERIOR_TEMPORAL_RESTRICTION: 0
OS_NORMAL: 1
OD_NORMAL: 1

## 2024-10-11 ASSESSMENT — REFRACTION_CURRENTRX
OD_BRAND: ONEFIT
OD_ADDL_SPECS: OPT EXTRA CLEAR, HYDRAPEG
OS_SPHERE: +0.50
OD_BASECURVE: 8.1
OS_BASECURVE: 8.2
OD_DIAMETER: 14.9
OS_DIAMETER: 14.9
OS_BRAND: ONEFIT
OD_SPHERE: -0.62
OS_ADDL_SPECS: OPT EXTRA BLUE, HYDRAPEG

## 2024-10-11 ASSESSMENT — TONOMETRY
OD_IOP_MMHG: 10
IOP_METHOD: ICARE
OS_IOP_MMHG: 10

## 2024-10-11 ASSESSMENT — SLIT LAMP EXAM - LIDS
COMMENTS: 1+ BLEPH, THICKENED MARGINS, 1-2+ MGD
COMMENTS: 1+ BLEPH, THICKENED MARGINS, 1-2+ MGD

## 2024-10-11 ASSESSMENT — CUP TO DISC RATIO
OS_RATIO: 0.45
OD_RATIO: 0.45

## 2024-10-11 ASSESSMENT — EXTERNAL EXAM - LEFT EYE: OS_EXAM: DERMATOCHALASIS

## 2024-10-11 ASSESSMENT — EXTERNAL EXAM - RIGHT EYE: OD_EXAM: DERMATOCHALASIS

## 2024-10-11 NOTE — NURSING NOTE
Chief Complaints and History of Present Illnesses   Patient presents with    Contact Lens Follow Up     Pt here for scleral lens follow up.     Chief Complaint(s) and History of Present Illness(es)       Contact Lens Follow Up              Laterality: both eyes    Comments: Pt here for scleral lens follow up.              Comments    Vision is good. New lens is comfortable.   Pt wife notes he is putting them himself.     Suma Crockett, COT on 10/11/2024 at 9:48 AM

## 2024-10-11 NOTE — PROGRESS NOTES
A/P  1.) Dry Eye OU  -s/p BMT 08/2021. Dryness right eye>left eye, symptomatic for photophobia/tearing  -Failed: Restasis/Xiidra (stinging), plugs (scarred puncta), BCL (retention)  -Chronic oral steroid use  -Now excellent response to scleral lens - corneal staining resolved OU. Much improved ocular comfort.   -Wife helping with I&R. Pt on oral steroids causing hands to shake, unable to do himself - he has been doing himself more often lately though    Int Hx:  -Doing well in scleral lens. Excellent vision/comfort/fit.   -No changes on lens fit recommended  -Small diam and slightly plus OR. Will leave as is for now but can reorder in more plus prn or larger diam if needing  -CARIE/chemosis episode after last time he tapered oral steroids - likely unrelated but monitor for future recurrences  -Not on topical steroids now. Has them in case of flare. Continue PF AT only    2.) Pseudophakia OU  -Doing well, excellent acuity    Continue with current lenses. Monitor 1 year comprehensive + CL check    I have confirmed the patient's CC, HPI and reviewed Past Medical History, Past Surgical History, Social History, Family History, Problem List, Medication List and agree with Tech note.     Aisha Juarez, ADILENE MONIQUEO SHARDAS

## 2024-10-15 ENCOUNTER — ONCOLOGY VISIT (OUTPATIENT)
Dept: TRANSPLANT | Facility: CLINIC | Age: 64
End: 2024-10-15
Attending: INTERNAL MEDICINE
Payer: MEDICARE

## 2024-10-15 ENCOUNTER — TELEPHONE (OUTPATIENT)
Dept: TRANSPLANT | Facility: CLINIC | Age: 64
End: 2024-10-15

## 2024-10-15 ENCOUNTER — APPOINTMENT (OUTPATIENT)
Dept: LAB | Facility: CLINIC | Age: 64
End: 2024-10-15
Attending: INTERNAL MEDICINE
Payer: MEDICARE

## 2024-10-15 VITALS
SYSTOLIC BLOOD PRESSURE: 108 MMHG | DIASTOLIC BLOOD PRESSURE: 73 MMHG | TEMPERATURE: 98.8 F | OXYGEN SATURATION: 96 % | HEART RATE: 86 BPM

## 2024-10-15 DIAGNOSIS — Z94.81 STATUS POST BONE MARROW TRANSPLANT (H): Primary | ICD-10-CM

## 2024-10-15 DIAGNOSIS — T86.09 GVHD AS COMPLICATION OF BONE MARROW TRANSPLANT (H): ICD-10-CM

## 2024-10-15 DIAGNOSIS — D89.813 GVHD AS COMPLICATION OF BONE MARROW TRANSPLANT (H): ICD-10-CM

## 2024-10-15 DIAGNOSIS — Z94.81 STATUS POST BONE MARROW TRANSPLANT (H): ICD-10-CM

## 2024-10-15 DIAGNOSIS — D80.1 HYPOGAMMAGLOBULINEMIA (H): ICD-10-CM

## 2024-10-15 DIAGNOSIS — R68.83 CHILLS (WITHOUT FEVER): ICD-10-CM

## 2024-10-15 DIAGNOSIS — Z94.81 S/P BONE MARROW TRANSPLANT (H): ICD-10-CM

## 2024-10-15 LAB
ALBUMIN SERPL BCG-MCNC: 3.9 G/DL (ref 3.5–5.2)
ALP SERPL-CCNC: 93 U/L (ref 40–150)
ALT SERPL W P-5'-P-CCNC: 18 U/L (ref 0–70)
ANION GAP SERPL CALCULATED.3IONS-SCNC: 11 MMOL/L (ref 7–15)
AST SERPL W P-5'-P-CCNC: 26 U/L (ref 0–45)
BASOPHILS # BLD AUTO: 0.1 10E3/UL (ref 0–0.2)
BASOPHILS NFR BLD AUTO: 1 %
BILIRUB SERPL-MCNC: 0.7 MG/DL
BUN SERPL-MCNC: 30.3 MG/DL (ref 8–23)
C PNEUM DNA SPEC QL NAA+PROBE: NOT DETECTED
CALCIUM SERPL-MCNC: 9.7 MG/DL (ref 8.8–10.4)
CHLORIDE SERPL-SCNC: 102 MMOL/L (ref 98–107)
CREAT SERPL-MCNC: 1.34 MG/DL (ref 0.67–1.17)
EGFRCR SERPLBLD CKD-EPI 2021: 58 ML/MIN/1.73M2
EOSINOPHIL # BLD AUTO: 0.2 10E3/UL (ref 0–0.7)
EOSINOPHIL NFR BLD AUTO: 1 %
ERYTHROCYTE [DISTWIDTH] IN BLOOD BY AUTOMATED COUNT: 15.7 % (ref 10–15)
FLUAV H1 2009 PAND RNA SPEC QL NAA+PROBE: NOT DETECTED
FLUAV H1 RNA SPEC QL NAA+PROBE: NOT DETECTED
FLUAV H3 RNA SPEC QL NAA+PROBE: NOT DETECTED
FLUAV RNA SPEC QL NAA+PROBE: NEGATIVE
FLUAV RNA SPEC QL NAA+PROBE: NOT DETECTED
FLUBV RNA RESP QL NAA+PROBE: NEGATIVE
FLUBV RNA SPEC QL NAA+PROBE: NOT DETECTED
GLUCOSE SERPL-MCNC: 123 MG/DL (ref 70–99)
HADV DNA SPEC QL NAA+PROBE: NOT DETECTED
HCO3 SERPL-SCNC: 25 MMOL/L (ref 22–29)
HCOV PNL SPEC NAA+PROBE: NOT DETECTED
HCT VFR BLD AUTO: 38.6 % (ref 40–53)
HGB BLD-MCNC: 13 G/DL (ref 13.3–17.7)
HMPV RNA SPEC QL NAA+PROBE: NOT DETECTED
HPIV1 RNA SPEC QL NAA+PROBE: NOT DETECTED
HPIV2 RNA SPEC QL NAA+PROBE: NOT DETECTED
HPIV3 RNA SPEC QL NAA+PROBE: NOT DETECTED
HPIV4 RNA SPEC QL NAA+PROBE: NOT DETECTED
IMM GRANULOCYTES # BLD: 0.1 10E3/UL
IMM GRANULOCYTES NFR BLD: 1 %
LYMPHOCYTES # BLD AUTO: 2 10E3/UL (ref 0.8–5.3)
LYMPHOCYTES NFR BLD AUTO: 18 %
M PNEUMO DNA SPEC QL NAA+PROBE: NOT DETECTED
MCH RBC QN AUTO: 33.6 PG (ref 26.5–33)
MCHC RBC AUTO-ENTMCNC: 33.7 G/DL (ref 31.5–36.5)
MCV RBC AUTO: 100 FL (ref 78–100)
MONOCYTES # BLD AUTO: 1.1 10E3/UL (ref 0–1.3)
MONOCYTES NFR BLD AUTO: 10 %
NEUTROPHILS # BLD AUTO: 8.1 10E3/UL (ref 1.6–8.3)
NEUTROPHILS NFR BLD AUTO: 70 %
NRBC # BLD AUTO: 0 10E3/UL
NRBC BLD AUTO-RTO: 0 /100
PLATELET # BLD AUTO: 205 10E3/UL (ref 150–450)
POTASSIUM SERPL-SCNC: 4.4 MMOL/L (ref 3.4–5.3)
PROT SERPL-MCNC: 6.8 G/DL (ref 6.4–8.3)
RBC # BLD AUTO: 3.87 10E6/UL (ref 4.4–5.9)
RSV RNA SPEC NAA+PROBE: NEGATIVE
RSV RNA SPEC QL NAA+PROBE: NOT DETECTED
RSV RNA SPEC QL NAA+PROBE: NOT DETECTED
RV+EV RNA SPEC QL NAA+PROBE: NOT DETECTED
SARS-COV-2 RNA RESP QL NAA+PROBE: NEGATIVE
SODIUM SERPL-SCNC: 138 MMOL/L (ref 135–145)
WBC # BLD AUTO: 11.5 10E3/UL (ref 4–11)

## 2024-10-15 PROCEDURE — 250N000011 HC RX IP 250 OP 636: Performed by: INTERNAL MEDICINE

## 2024-10-15 PROCEDURE — 87637 SARSCOV2&INF A&B&RSV AMP PRB: CPT

## 2024-10-15 PROCEDURE — 87486 CHLMYD PNEUM DNA AMP PROBE: CPT

## 2024-10-15 PROCEDURE — 36415 COLL VENOUS BLD VENIPUNCTURE: CPT

## 2024-10-15 PROCEDURE — 99215 OFFICE O/P EST HI 40 MIN: CPT

## 2024-10-15 PROCEDURE — 36591 DRAW BLOOD OFF VENOUS DEVICE: CPT

## 2024-10-15 PROCEDURE — 85004 AUTOMATED DIFF WBC COUNT: CPT

## 2024-10-15 PROCEDURE — 82947 ASSAY GLUCOSE BLOOD QUANT: CPT

## 2024-10-15 PROCEDURE — 82784 ASSAY IGA/IGD/IGG/IGM EACH: CPT

## 2024-10-15 PROCEDURE — G0463 HOSPITAL OUTPT CLINIC VISIT: HCPCS

## 2024-10-15 PROCEDURE — 87040 BLOOD CULTURE FOR BACTERIA: CPT

## 2024-10-15 PROCEDURE — G2211 COMPLEX E/M VISIT ADD ON: HCPCS

## 2024-10-15 RX ORDER — HEPARIN SODIUM (PORCINE) LOCK FLUSH IV SOLN 100 UNIT/ML 100 UNIT/ML
5 SOLUTION INTRAVENOUS ONCE
Status: COMPLETED | OUTPATIENT
Start: 2024-10-15 | End: 2024-10-15

## 2024-10-15 RX ADMIN — Medication 5 ML: at 12:19

## 2024-10-15 ASSESSMENT — PAIN SCALES - GENERAL: PAINLEVEL: MILD PAIN (2)

## 2024-10-15 NOTE — TELEPHONE ENCOUNTER
BMT Nurse Triage - Generic/Other Symptoms     Treating Provider:   Dr. Cote    Date of last office visit: 9/9/24    Onset of symptoms: Yesterday 10/14    Brief description of symptoms: 10/14 patient developed fever 101.0, achy, chills. He took tylenol and symotoms improved. Today 10/15 pt developed a fever again of 99.9, BP elevated 169/104, headache, chills, achy, hips hurt, shivering.     Spoke with BMT GIRISH: Pt should be seen today. Lab appt and provider visit request sent to scheduling. Lab orders placed.

## 2024-10-15 NOTE — PROGRESS NOTES
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 Recent oral HSV and oral cGVHD flare treated with prednisone.  Feb 2024 Bronchitis.  Discharged last week from local admission for RSV PNA.       HPI: Nik is added on to clinic schedule for acute illness. On 10/14 he developed fever 101.0 and felt achy/had chills. He took tylenol and symptoms improved. Today 10/15 had a temp of 100 and took tylenol.  Again feels a little achy--shoulders, hips, back. Also with headache. No cough, congestion or rhinorrhea.  He thinks he may be a bit dehydrated as he was working at his cabin all weekend and resting much of the day yesterday.  Of note, he has a port and is immunosuppressed due to cGVHD regimen.  Most recently he has been bothered by mouth cGVHD which was HSV swab neg and improved with increasing pred back to 15 mg daily and increasing the dex s/s to 4x/day.     Review of Systems                                                                                                                                         10 point Review of systems was negative except as detailed above     PHYSICAL EXAM                                                                                                                                                   KPS: 70  Blood pressure 108/73, pulse 86, temperature 98.8  F (37.1  C), temperature source Oral, SpO2 97%.    Repeat VS taken reported in flow sheet    Wt Readings from Last 4 Encounters:   09/23/24 118.9 kg (262 lb 1.6 oz)   09/09/24 118.8 kg (261 lb 14.4 oz)   08/05/24 117.8 kg (259 lb 9.6 oz)   07/09/24 118.1 kg (260 lb 4.8 oz)      General: NAD.  HEENT: sclera anicteric.  Lichenoid changes and associated erythema of b/l buccal mucosa.  No active ulceration.  Lungs:  CTA b/l  no wheezes  CV: RRR  no gallop  Abd; soft, NABS, protuberant  Ext/ Skin: Skin bruising on bilateral  forearms,  fainting of previous hyperpigmented areas of previously known cGVHD      cGVHD therapy started on February 24 2022  - Baseline NIH scoring 2/24/2022 : skin 2 maculopapular rash/erythema with no sclerotic features, mouth score 1 mild symptoms with lichenoid changes less than 25%, eyes score 2 moderate symptoms without new visual impairments due to KCS requiring lubricant eyedrops more than 3 times a day, overall mild scale for her provider  - 3/25/2022 1 month NIH treatment assessment on PQRST: skin 2, mouth score 1 mild symptoms with NO lichenoid changes, eyes score 2 moderate symptoms w requiring lubricant eyedrops more than 3 times a day, liver score 1 ALT 3.7x ULN and nl bilirubin   - 3/31  Mouth clear; eyes minimal LFT still abn; no joint or new GI sx  - 4/28 Mouth clear, Left>R eye is mild sclera erythema, lids are pink and irritated appearing, LFT's slow improvement, no new joint, skin, or GI symptoms.   -5/11 mouth with mild erythema but no lichenoid changes, eyes using eyedrops 4-6 times a day score of 2, LFTs significantly improved from baseline slight elevation in alk phos and AST with normal bilirubin, skin with hyperpigmentation from old acute GVHD no active skin rashes, no GI symptoms no musculoskeletal complaints.  -5/26 Mouth with very slight lichenoid changes, erythema.  Eyes still using eyedrops 4-6x per day.  LFTs essentially normal with slight elevation in alk phos.  Skin with hyperpigmentation from old acute GVHD, no active rashes, no GI or MSK concerns.  Skin 0, mouth 0 but with lichenoid changes, eyes 2  -6/7/22 Mouth with no lichenoid changes, erythema.  Eyes still using eyedrops 4-6x per day.  LFTs essentially normal with slight elevation in alk phos.  Skin with hyperpigmentation from old acute GVHD, no active rashes, no GI or MSK concerns.  Skin 0, mouth 0, eyes 2     -6 month PQRST assessment 9/6/2022: eyes 3, mouth 0 for symptoms, 25% lichenoid changes (1), liver/GI/skin  0    11/8/2022, 11/22/2022, 12/13/22 cGVHD NIG scoring eyes 3, no other organ involvement clinically  3/28/23 cGVHD NIG scoring eyes 3, no other organ involvement clinically  5/2/23 cGVHD NIG scoring eyes 3, oral 1, no other organ involvement clinically  6/13/23 cGVHD NIG scoring eyes 3, oral 1, no other organ involvement clinically  8/15/23 cGVHD NIG scoring eyes 3 (down to 3-4 times eye drop from >10 but still using scleral lenses), oral 1, no other organ involvement clinically  10/10/2023 cGVHD NIG scoring eyes 3 (down to 3-4 times eye drop from >10 but still using scleral lenses), oral 1, no other organ involvement clinically  11/14/2023 cGVHD NIG scoring eyes 3 (down to 3-4 times eye drop from >10 but still using scleral lenses), oral 1, no other organ involvement clinically  1/9/2024 cGVHD NIG scoring eyes 3 (down to 3-4 times eye drop from >10 but still using scleral lenses), oral 2, no other organ involvement clinically  2/27/2024 cGVHD NIG scoring eyes 3 (down to 3-4 times eye drop from >10 but still using scleral lenses), oral 1, no other organ involvement clinically  4/22024 cGVHD NIG scoring eyes 3 (down to 2 times eye drop from >10 but still using scleral lenses), oral 1, no other organ involvement clinically  8/5/2024 cGVHD NIH scoring eyes 2 (down to 2 times eye drop from >10 but still using scleral lenses), oral 0, no other organ involvement clinically    Lab:    Lab Results   Component Value Date    WBC 8.7 09/09/2024    ANEU 3.5 10/26/2021    HGB 12.8 (L) 09/09/2024    HCT 37.2 (L) 09/09/2024     09/09/2024     09/09/2024    POTASSIUM 4.6 09/09/2024    CHLORIDE 102 09/09/2024    CO2 25 09/09/2024     (H) 09/09/2024    BUN 40.5 (H) 09/09/2024    CR 1.25 (H) 09/09/2024    MAG 2.0 11/14/2023    INR 0.90 12/01/2022    BILITOTAL 0.7 09/09/2024    AST 35 09/09/2024    ALT 27 09/09/2024    ALKPHOS 91 09/09/2024    PROTTOTAL 7.0 09/09/2024    ALBUMIN 4.3 09/09/2024       ASSESSMENT AND  ROZINA Nava is a 64 year old male with Ph Pos ALL, day 1162 s/p sib allo stem cell transplant    Day -6 (8/4): flu/cy  Day -5 through day -2 (8/5-8/8): flu  Day -1 (8/9): TBI  Day 0 (8/10): transplant     1.  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 S/p allo sib PBSCT. (ABO matched)  HCT-CI score: 3 (prior solid tumor)  Day +100 bone marrow biopsy is 100% donor with no morphologic or flow cytometric evidence of leukemia BCR abl is pending.  - Day +180 restaging BM bx- still in CR  100% donor;  2/16/22 BCR ABL major breakpoint undetectable.   - 1 year restaging 8/18/2022: Markedly hypocellular bone marrow [less than 10% cellular] with maturing trilineage hematopoiesis and no definite morphologic or immunophenotypic evidence for involvement by B lymphoblastic leukemia. BCRABL1 PCR not detected, bone marrow % donor. FISH NORMAL No evidence of BCR-ABL1 fusion  - Maintenance with TKI posttransplantation given his Ph positive ALL that have not been started previously presumed secondary to multiple active issues specifically infections and COVID.  Per Avila Tobar: will reconsider this patient will think about whether this is something he would like to consider he was previously on Dasatinib, we would start on a low dose and increase as tolerated.  This is on hold now pending active GVHD therapy, we discussed on this visit 10/18 in agreement with holding off.  - 2 year restaging 8/7/2023 BMB: - No morphologic or immunophenotypic evidence of recurrent/persistent B-lymphoblastic leukemia. Slightly hypocellular marrow (10-20% estimated cellularity, patchy and variable), with trilineage hematopoetiic maturation and less than 2% blasts. Peripheral blood showing slight normocytic normochromic anemia and slight thrombocytopenia. BM % donor. FISH No evidence of BCR::ABL1 fusion /CG No recipient (male) cells  or cells with a t(9;22) or other clonal chromosomal abnormality  were  detected among the 20 metaphases analyzed.      2.  HEME:   - mild leukocytosis noted-blood cx pending    Historical:  3/2023 iron low 28, iron saturation index 10%, transferrin 225, ferritin 1381 down trending (in retrospect that was the time of epidural abscess as well).  B12, folate normal.  High copper and zinc borderline low, 5/2023 started zinc.  Started iron replacement in 4/2023, recheck iron profile on follow up more consistent with AOCD, Iron 11/14/23 WNL. Note ferritin elevation is in the setting of discitis/OM, 4/2 down to 1500 but recovering from RSV with nl iron profile, low threshold to obtian ferriscan to objectively assess for iron overload.     3.  FEN/Renal: prerenal azotemia--push oral fluids; s/p nephrectormy, nephrology following     4.    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   8/5/24: Prednisone 15/10  8/28: Pred increased back 15 mg daily, dex s/s QID     #Skin GVHD- history of biopsy proven GVHD of the skin 8/30/2021; resolved.   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 WNL today   # Ocular GVHD: follows with ophthalmology. Maxitrol to lids, continue refreshing drops QID. Score 3, but down to 2 times eye drops from >10/day, using scleral lenses  Followed closely by Dr. Juarez with significant improvement with scleral lenses and eyedrops  # Oral GVHD: pred increase as above, dex s/s QID. Lichenoid changes prominent L-buccal mucosa. Good pain control during appointment on 9/9/24.     Previous therapies  Tacrolimus-previously decided to stay on same dose, no checks of levels if clinically stable, and no need switch to sirolimus. (keep tac low as gvhd is quiet and viremia). 5/10 level 45 with starting of COVID therapy and D-D intractions (Paxlovid for COVID19, which has a drug interaction with tacrolimus-Ritonavir increases serum levels of tacrolimus).   Tacrolimus level subtherapeutic, increase to 2.5 mg  twice daily recently, 8/23/2022 instructed to change tacrolimus to 2 mg twice daily. 9/6 with mild NEGRA, hyperkalemia, hypomagnesemia, and reports some tremors and cramps, suspect tacrolimus to be high therefore empirically decreased to 1.5 twice daily, skip morning dose of tacrolimus and took 2 mg today after his afternoon labs. Decrease to 1mg BID on Saturday.  Sirolimus  9/21 despite fluids and a dose adjustment of tacrolimus patient seem to have persistence NORBERTO related NEGRA, therefore after discussion with the patient decision was made to transition to sirolimus that was started on 9/21, patient initially seem to tolerate this well with normalization of kidney function  10/11 presented with flares of ocular and oral GVHD, fluid retention and gain of 5-6 kg, lower extremity edema, abdominal distention, total protein to creatinine ratio elevated at 0.4, in addition to elevation in BUN and creatinine, HTN.  Picture most consistent with sirolimus related side effects.  Less likely that the patient will tolerate even a lower level of sirolimus.  Therefore the patient was instructed to stop sirolimus, last dose on 10/10. 10/14 level 3.5 appropriately trending down.     Corticosteroids  3/1-3/16: prednisone 100mg every day  3/17 75mg every day   3/31 75 alt with 50  4/6: 75 alt with 25mg every other day  4/12 pred tapered to 60 alternating with 25mg   4/15 In setting of viruses trying to reactivate,GVHD stable: Taper 60/15mg alternating.  4/20 decrease pred further to 50/10.    4/25 taper pred to 50/0 every other day as long as symptoms stable.   5/2 Pred decrease 40/0 alternating every other day.   5/13 decrease to 30/0  5/20 decrease to 20/0 then slowly by 5 mg weekly  6/7 decrease to 10/0  Went up to 15/0 with mild increased LFTs  8/23/2022 increased to 20/0, with persistence of oral ulcers and active ocular GVHD.  Discussed with the patient the importance of stopping chewing of nicotine gums for prolonged hours as  that would not help with the healing of the oral ulcers.  I discussed with the patient alternatives of nicotine patches that he will reconsider and let me know.  9/6 decreased to 15 alternating with 0  9/13 decreased to 10 alternating with 0  9/21 discontinued tacrolimus given persistent NEGRA and single kidney and start the patient on sirolimus without loading dose.  We will get a list of possible side effects and adverse events from the Pharm.D. see sirolimus section above.  10/11 GVHD flare. Sirolimus stopped. Resume prednisone 40 mg daily as of 10/12, no change with mildly worsening eye pain 10/14  Reduce pred to 35mg 10/25 and 25mg 11/1 11/8 20 mg   11/22 15 mg  12/13/22 10 mg (plan to taper to 5mg then slow taper 1 mg per month given long pred history)  2/23/23 lower from 5 mg to 4 mg for the next month  3/28/2023 - 5/2/2023- 8/15/2023 continue on 10/4 given adrenal insufficieny with recent am cortisol check and clinical symptoms on taper to lower dose of 4 mg daily  -12/2023 had oral GVHD flare started on Prednisone 40 mg,  start taper to 40 and 30 mg alternating for 1 week then 30 mg daily for 1 week, then 30 and 20 alternating for 1 week, then 20 for 1 week till he sees me.  - 2/27/2024 decrease from 10/20mg prednisone to 10/15 then 10/10 -flared and now on 15mg with   - 4/2/2024  15mg  - 5/9/24 15/10  - 8/5/24 10 daily  - 9/9/24 15 mg daily ongoing    Belumosudil  Patient intolerant/with disease flares on tacrolimus and sirolimus see above.  Discussed with the patient the different options for therapy of chronic GVHD that are FDA approved.  Given the side effect profiles after discussing in detail and given the least nephrotoxicity and expected response, taking into account other metabolic effect as well.  We will proceed with prior authorization with belumosudil.  Patient was given material to review for possible expected adverse events and side effects with both Belumosodil and ruxolitinib.   --- Started  on 10/18/2022 - skin/liver/oral GVHD inactive, and occular symptoms controlled/symptomatic improvement.   --- 10/10/2023 will hold belumosodil given recurrent infections ans significant improvement in symptoms with no end organ damage after discussions with him and his wife. Will optimize topical treatment with scleral lenses, eyedrops ans dex s/sp int he interim to avoid flares. Will readdress need to restart systemic treatment pending infectious resolution and symptoms.      5.  ID: recent fever in immunocompromised host; mild leukocytosis; blood cx, RVP, covid pending    # history of Epidural abscess: Followed by Dr. Candelario ID, plan to be on IV ceftriaxone for at least 6 weeks course through 5/11/2023-to be determined on further follow-up.  See imaging with MRI follow-up as above.     #Recurrent discitis/OM still on treatment through October 2023, cx negative, managed with ID locally.  3/30/2023 blood culture with Staph epidermidis  3/31/2023 BONE, L5, FLUOROSCOPICALLY-GUIDED NEEDLE BIOPSY: Benign bone with trilineage hematopoiesis (normal) Negative for neoplasm Negative for acute osteomyelitis. DISC, L5-S1, ASPIRATION FOR CYTOLOGY: Acellular debris; no cellular material present for evaluation     #History of COVID x2 last 1/2023 and influenza    Treated with Paxlovid with persistent fatigue but no active respiratory symptoms    #History of pulmonary infiltrates:   4/20 CT chest with new RUL infiltrate. Highly immunocompromised. 4/22 Fungitell/asp GM were neg. BAL 4/29 NGTD, increased micafungin to 150mg IV daily-- f/u 6/1 Dr Garcia CT with mixed results, followed with Dr. Garcia August 2022 with resolution of pulmonary nodules and normalization of LFTs switched patient to posaconazole prophylactic dose and monitor LFTs and any infectious concerns closely. Monitor and low threshold check CT or add back if on higher dose steroids.     #History of CMV viremia:    - CMV viremia up to 1100. 4/15 started valcyte 900mg  PO BID. 4/20 CMV <137, 5/10 ND, decreased to 450mg BID 4/30 - continue 4 weeks as long as CMV <137 till 5/28 + weekly CMV. Switched to acyclovir after completion of therapy 5/28/2022. recheck 3/1/23 ND    # History of Oral herpes  12/2023:HSV oral lesions; PCR positive. Stopped ACV last month and lesions appears soon thereafter. Empirically Rx Valtrex. mild lingering URI sx; RVP + rhino (12/21); supportive cares. (CCT 12/1/23 neg).started on pred 40mg/d.  Completed Valtrex course and put back on acyclovir restarted 1/20/2024    # History of PNA/bronchitis 2/9/2024 sp doxycyline    # History of RSV PNA 3/26/2024, s/p ribavirin    #Hypogammaglobulinemia with recurrent infections: maintain IgG > 400       - PPx:   ---ACV  Patient developed oral herpes reactivation after stopping acyclovir therefore resumed on 1/9/2023  ---Posaconazole (previously on micafungin with LFts abl, hx of pulmonary nodules needign treatment dose). 11/14/2023 discontinue and check CT in 2 months given history completed December 2023 with no concerns for infections or nodular lesions. Monitor and low threshold check CT or add back if on higher dose steroids.  ---Pentamidine, future 4/22/2024  ---On Penicillin 250 bid px     - EBV viremia: 4/20 CAP CT (w/ contrast): No adenopathy. S/p 4/22 and 5/4/22 rituximab 375mg/m2 5/10 ND x2, 3/13/2024     - vaccinations: Previously deferred annual vaccines in the setting of GVHD and immunosuppression therapy. 11/14/2023 started vaccination series, including Shingrix, COVID-vaccine. 1/9/2023 Shingrix vaccine.      received his influenza/covid vaccine 8/24/2024 and RSV vaccine 8/13.     6. Endo:   - Hx of graves disease; On synthroid follows with endocrine  - HLD: 11/2022 cholesterol 355, triglycerides 153, -- 11/8 started rosuvastatin 5 mg daily, 11/22 CPK within normal, 5/2/2023 repeat lipid profile cholesterol down to 202, triglycerides 191, LDL now normal at 95.  We will continue same dose  of rosuvastatin and add fish oil 1g BID (on hold). Repeated August with PCP, who added back fish oil    7. CV:   - Hypertension history   - TTE most recently 10/2022, ECHO in January 2024 at [facility]     8. GI:   - Omeprazole for heartburn/ GI protection (steroids)  - LFTs as above, now normal. Off ursodiol     9. Psych:   - Situational anxiety - lexapro 10mg daily.   - Insomnia: worse on steroids. Ativan, trazadone, melatonin     10. MSK:   -History of left food drop, PT. Occasional muscle cramps discussed optimizing vitamin D levels and considering vitamin D, some of the symptomatology of muscle cramps are likely related to chronic GVHD.  No muscle cramps reported today.  -4/2023 new tearing of the right acetabular labrum anterosuperiorly referred to to orthopedic surgery.       11. HCM/Age appropriate cancer screening:  -Discussed with the patient importance of age-appropriate cancer screening including colonoscopies, he is due for this.  -Discussed importance of at least once a year vitamin D level check, 8/18/2022 wnl  -Screening for secondary iron overload, see heme section above  -Yearly TSH 2022 wnl; yearly lipid panel - see GI section above  -9/6/2022 DEXA scan Based on BMD diagnosis is consistent with normal bone density based on WHO criteria Ref. 1   -PFTs 9/20/22, see above. 9/21/2023 PFTs The FVC, FEV1, FEV1/FVC ratio and OXH97-72% are within normal limits. FVC 99% (108), FEV1 91%, DLCO uncorrected 91%.  Repeat PFTs in 4 to 6 months.  -Metabolic other, 8/2022 testosterone within normal  -11/22/2022 discussed with the patient the challenges and the stresses of chronic illness and healthcare fatigue.  Patient had previously been on Lexapro that we restarted confirmed with pharmacy that there are no drug drug interactions.  Additionally we will referred patient to PMNR to help with physical rehab and strength to help with fatigue.  Our social workers will also reach out to Nik and his wife for further  resources including support groups for patients with chronic illness and fatigue post transplant.  -Referral to palliative care for symptom and pain management including insomnia     Summary: I recommended that he reconsider a sleep evaluation because of the potential effect of sleep apnea on sleep quality and the resulting effect on daytime sleepiness and fatigue.  He is open to the idea although somewhat resistant to using the device.  I pointed out that there may be new devices or other ways to make this more tolerable.  He had difficulty with prednisone doses lower than 15 mg and we agreed to continue the current dosing.    Plan:  - hold norvasc tonight; take BP in AM and will address then  - f/up covid/RVP/bld cx  - call sooner if you feel lightheaded or with new medical concerns  - push PO fluids    - continue prednisone 15 mg daily along with dex s/s QID  - RTC to see Dr. Cote on 10/18/24   - IVIG if IgG lower than 400, level ordered for 10/18  - continue follow up for endo, neph, PCP    **addendum:  spoke to pt 10/16--pt remains afebrile.  RVP/covid/flu negative.  Bld cx NGTD.  Pt feels better today.  BP ok.  Standing BP WNL.  Will resume norvasc tonight    The longitudinal plan of care for the diagnosis(es)/condition(s) as documented were addressed during this visit. Due to the added complexity in care, I will continue to support Nik in the subsequent management and with ongoing continuity of care.      40 minutes spent on the date of the encounter doing chart review, history and exam, lab review, documentation and coordniating care.    Lashanda Figueroa pa-c  [phone]

## 2024-10-15 NOTE — LETTER
10/15/2024      Nik Nava  [id number] [facility] [id number]      Dear Colleague,    Thank you for referring your patient, Nik Nava, to the [facility]ANSPL[facility] [place]. Please see a copy of my visit note below.      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 Recent oral HSV and oral cGVHD flare treated with prednisone.  Feb 2024 Bronchitis.  Discharged last week from local admission for RSV PNA.       HPI: Nik is added on to clinic schedule for acute illness. On 10/14 he developed fever 101.0 and felt achy/had chills. He took tylenol and symptoms improved. Today 10/15 had a temp of 100 and took tylenol.  Again feels a little achy--shoulders, hips, back. Also with headache. No cough, congestion or rhinorrhea.  He thinks he may be a bit dehydrated as he was working at his cabin all weekend and resting much of the day yesterday.  Of note, he has a port and is immunosuppressed due to cGVHD regimen.  Most recently he has been bothered by mouth cGVHD which was HSV swab neg and improved with increasing pred back to 15 mg daily and increasing the dex s/s to 4x/day.     Review of Systems                                                                                                                                         10 point Review of systems was negative except as detailed above     PHYSICAL EXAM                                                                                                                                                   KPS: 70  Blood pressure 108/73, pulse 86, temperature 98.8  F (37.1  C), temperature source Oral, SpO2 97%.    Repeat VS taken reported in flow sheet    Wt Readings from Last 4 Encounters:   09/23/24 118.9 kg (262 lb 1.6 oz)   09/09/24 118.8 kg (261 lb 14.4 oz)   08/05/24 117.8 kg (259 lb 9.6 oz)   07/09/24 118.1  kg (260 lb 4.8 oz)      General: NAD.  HEENT: sclera anicteric.  Lichenoid changes and associated erythema of b/l buccal mucosa.  No active ulceration.  Lungs:  CTA b/l  no wheezes  CV: RRR  no gallop  Abd; soft, NABS, protuberant  Ext/ Skin: Skin bruising on bilateral forearms,  fainting of previous hyperpigmented areas of previously known cGVHD      cGVHD therapy started on February 24 2022  - Baseline NIH scoring 2/24/2022 : skin 2 maculopapular rash/erythema with no sclerotic features, mouth score 1 mild symptoms with lichenoid changes less than 25%, eyes score 2 moderate symptoms without new visual impairments due to KCS requiring lubricant eyedrops more than 3 times a day, overall mild scale for her provider  - 3/25/2022 1 month NIH treatment assessment on PQRST: skin 2, mouth score 1 mild symptoms with NO lichenoid changes, eyes score 2 moderate symptoms w requiring lubricant eyedrops more than 3 times a day, liver score 1 ALT 3.7x ULN and nl bilirubin   - 3/31  Mouth clear; eyes minimal LFT still abn; no joint or new GI sx  - 4/28 Mouth clear, Left>R eye is mild sclera erythema, lids are pink and irritated appearing, LFT's slow improvement, no new joint, skin, or GI symptoms.   -5/11 mouth with mild erythema but no lichenoid changes, eyes using eyedrops 4-6 times a day score of 2, LFTs significantly improved from baseline slight elevation in alk phos and AST with normal bilirubin, skin with hyperpigmentation from old acute GVHD no active skin rashes, no GI symptoms no musculoskeletal complaints.  -5/26 Mouth with very slight lichenoid changes, erythema.  Eyes still using eyedrops 4-6x per day.  LFTs essentially normal with slight elevation in alk phos.  Skin with hyperpigmentation from old acute GVHD, no active rashes, no GI or MSK concerns.  Skin 0, mouth 0 but with lichenoid changes, eyes 2  -6/7/22 Mouth with no lichenoid changes, erythema.  Eyes still using eyedrops 4-6x per day.  LFTs essentially normal  with slight elevation in alk phos.  Skin with hyperpigmentation from old acute GVHD, no active rashes, no GI or MSK concerns.  Skin 0, mouth 0, eyes 2     -6 month PQRST assessment 9/6/2022: eyes 3, mouth 0 for symptoms, 25% lichenoid changes (1), liver/GI/skin 0    11/8/2022, 11/22/2022, 12/13/22 cGVHD NIG scoring eyes 3, no other organ involvement clinically  3/28/23 cGVHD NIG scoring eyes 3, no other organ involvement clinically  5/2/23 cGVHD NIG scoring eyes 3, oral 1, no other organ involvement clinically  6/13/23 cGVHD NIG scoring eyes 3, oral 1, no other organ involvement clinically  8/15/23 cGVHD NIG scoring eyes 3 (down to 3-4 times eye drop from >10 but still using scleral lenses), oral 1, no other organ involvement clinically  10/10/2023 cGVHD NIG scoring eyes 3 (down to 3-4 times eye drop from >10 but still using scleral lenses), oral 1, no other organ involvement clinically  11/14/2023 cGVHD NIG scoring eyes 3 (down to 3-4 times eye drop from >10 but still using scleral lenses), oral 1, no other organ involvement clinically  1/9/2024 cGVHD NIG scoring eyes 3 (down to 3-4 times eye drop from >10 but still using scleral lenses), oral 2, no other organ involvement clinically  2/27/2024 cGVHD NIG scoring eyes 3 (down to 3-4 times eye drop from >10 but still using scleral lenses), oral 1, no other organ involvement clinically  4/22024 cGVHD NIG scoring eyes 3 (down to 2 times eye drop from >10 but still using scleral lenses), oral 1, no other organ involvement clinically  8/5/2024 cGVHD NIH scoring eyes 2 (down to 2 times eye drop from >10 but still using scleral lenses), oral 0, no other organ involvement clinically    Lab:    Lab Results   Component Value Date    WBC 8.7 09/09/2024    ANEU 3.5 10/26/2021    HGB 12.8 (L) 09/09/2024    HCT 37.2 (L) 09/09/2024     09/09/2024     09/09/2024    POTASSIUM 4.6 09/09/2024    CHLORIDE 102 09/09/2024    CO2 25 09/09/2024     (H) 09/09/2024    BUN  40.5 (H) 09/09/2024    CR 1.25 (H) 09/09/2024    MAG 2.0 11/14/2023    INR 0.90 12/01/2022    BILITOTAL 0.7 09/09/2024    AST 35 09/09/2024    ALT 27 09/09/2024    ALKPHOS 91 09/09/2024    PROTTOTAL 7.0 09/09/2024    ALBUMIN 4.3 09/09/2024       ASSESSMENT AND PLAN   Nik Nava is a 64 year old male with Ph Pos ALL, day 1162 s/p sib allo stem cell transplant    Day -6 (8/4): flu/cy  Day -5 through day -2 (8/5-8/8): flu  Day -1 (8/9): TBI  Day 0 (8/10): transplant     1.  Acute lymphoblastic leukemia, [place] chromosome positive in CR, MRD neg. S/p allo sib PBSCT. (ABO matched)  HCT-CI score: 3 (prior solid tumor)  Day +100 bone marrow biopsy is 100% donor with no morphologic or flow cytometric evidence of leukemia BCR abl is pending.  - Day +180 restaging BM bx- still in CR  100% donor;  2/16/22 BCR ABL major breakpoint undetectable.   - 1 year restaging 8/18/2022: Markedly hypocellular bone marrow [less than 10% cellular] with maturing trilineage hematopoiesis and no definite morphologic or immunophenotypic evidence for involvement by B lymphoblastic leukemia. BCRABL1 PCR not detected, bone marrow % donor. FISH NORMAL No evidence of BCR-ABL1 fusion  - Maintenance with TKI posttransplantation given his Ph positive ALL that have not been started previously presumed secondary to multiple active issues specifically infections and COVID.  Per Avila Tobar: will reconsider this patient will think about whether this is something he would like to consider he was previously on Dasatinib, we would start on a low dose and increase as tolerated.  This is on hold now pending active GVHD therapy, we discussed on this visit 10/18 in agreement with holding off.  - 2 year restaging 8/7/2023 BMB: - No morphologic or immunophenotypic evidence of recurrent/persistent B-lymphoblastic leukemia. Slightly hypocellular marrow (10-20% estimated cellularity, patchy and variable), with trilineage hematopoetiic maturation and less  than 2% blasts. Peripheral blood showing slight normocytic normochromic anemia and slight thrombocytopenia. BM % donor. FISH No evidence of BCR::ABL1 fusion /CG No recipient (male) cells  or cells with a t(9;22) or other clonal chromosomal abnormality were  detected among the 20 metaphases analyzed.      2.  HEME:   - mild leukocytosis noted-blood cx pending    Historical:  3/2023 iron low 28, iron saturation index 10%, transferrin 225, ferritin 1381 down trending (in retrospect that was the time of epidural abscess as well).  B12, folate normal.  High copper and zinc borderline low, 5/2023 started zinc.  Started iron replacement in 4/2023, recheck iron profile on follow up more consistent with AOCD, Iron 11/14/23 WNL. Note ferritin elevation is in the setting of discitis/OM, 4/2 down to 1500 but recovering from RSV with nl iron profile, low threshold to obtian ferriscan to objectively assess for iron overload.     3.  FEN/Renal: prerenal azotemia--push oral fluids; s/p nephrectormy, nephrology following     4.    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   8/5/24: Prednisone 15/10  8/28: Pred increased back 15 mg daily, dex s/s QID     #Skin GVHD- history of biopsy proven GVHD of the skin 8/30/2021; resolved.   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 WNL today   # Ocular GVHD: follows with ophthalmology. Maxitrol to lids, continue refreshing drops QID. Score 3, but down to 2 times eye drops from >10/day, using scleral lenses  Followed closely by Dr. Juarez with significant improvement with scleral lenses and eyedrops  # Oral GVHD: pred increase as above, dex s/s QID. Lichenoid changes prominent L-buccal mucosa. Good pain control during appointment on 9/9/24.     Previous therapies  Tacrolimus-previously decided to stay on same dose, no checks of levels if clinically stable, and no need switch to sirolimus. (keep tac low as gvhd is  quiet and viremia). 5/10 level 45 with starting of COVID therapy and D-D intractions (Paxlovid for COVID19, which has a drug interaction with tacrolimus-Ritonavir increases serum levels of tacrolimus).   Tacrolimus level subtherapeutic, increase to 2.5 mg twice daily recently, 8/23/2022 instructed to change tacrolimus to 2 mg twice daily. 9/6 with mild NEGRA, hyperkalemia, hypomagnesemia, and reports some tremors and cramps, suspect tacrolimus to be high therefore empirically decreased to 1.5 twice daily, skip morning dose of tacrolimus and took 2 mg today after his afternoon labs. Decrease to 1mg BID on Saturday.  Sirolimus  9/21 despite fluids and a dose adjustment of tacrolimus patient seem to have persistence NORBERTO related NEGRA, therefore after discussion with the patient decision was made to transition to sirolimus that was started on 9/21, patient initially seem to tolerate this well with normalization of kidney function  10/11 presented with flares of ocular and oral GVHD, fluid retention and gain of 5-6 kg, lower extremity edema, abdominal distention, total protein to creatinine ratio elevated at 0.4, in addition to elevation in BUN and creatinine, HTN.  Picture most consistent with sirolimus related side effects.  Less likely that the patient will tolerate even a lower level of sirolimus.  Therefore the patient was instructed to stop sirolimus, last dose on 10/10. 10/14 level 3.5 appropriately trending down.     Corticosteroids  3/1-3/16: prednisone 100mg every day  3/17 75mg every day   3/31 75 alt with 50  4/6: 75 alt with 25mg every other day  4/12 pred tapered to 60 alternating with 25mg   4/15 In setting of viruses trying to reactivate,GVHD stable: Taper 60/15mg alternating.  4/20 decrease pred further to 50/10.    4/25 taper pred to 50/0 every other day as long as symptoms stable.   5/2 Pred decrease 40/0 alternating every other day.   5/13 decrease to 30/0  5/20 decrease to 20/0 then slowly by 5 mg  weekly  6/7 decrease to 10/0  Went up to 15/0 with mild increased LFTs  8/23/2022 increased to 20/0, with persistence of oral ulcers and active ocular GVHD.  Discussed with the patient the importance of stopping chewing of nicotine gums for prolonged hours as that would not help with the healing of the oral ulcers.  I discussed with the patient alternatives of nicotine patches that he will reconsider and let me know.  9/6 decreased to 15 alternating with 0  9/13 decreased to 10 alternating with 0  9/21 discontinued tacrolimus given persistent NEGRA and single kidney and start the patient on sirolimus without loading dose.  We will get a list of possible side effects and adverse events from the Pharm.D. see sirolimus section above.  10/11 GVHD flare. Sirolimus stopped. Resume prednisone 40 mg daily as of 10/12, no change with mildly worsening eye pain 10/14  Reduce pred to 35mg 10/25 and 25mg 11/1 11/8 20 mg   11/22 15 mg  12/13/22 10 mg (plan to taper to 5mg then slow taper 1 mg per month given long pred history)  2/23/23 lower from 5 mg to 4 mg for the next month  3/28/2023 - 5/2/2023- 8/15/2023 continue on 10/4 given adrenal insufficieny with recent am cortisol check and clinical symptoms on taper to lower dose of 4 mg daily  -12/2023 had oral GVHD flare started on Prednisone 40 mg,  start taper to 40 and 30 mg alternating for 1 week then 30 mg daily for 1 week, then 30 and 20 alternating for 1 week, then 20 for 1 week till he sees me.  - 2/27/2024 decrease from 10/20mg prednisone to 10/15 then 10/10 -flared and now on 15mg with   - 4/2/2024  15mg  - 5/9/24 15/10  - 8/5/24 10 daily  - 9/9/24 15 mg daily ongoing    Belumosudil  Patient intolerant/with disease flares on tacrolimus and sirolimus see above.  Discussed with the patient the different options for therapy of chronic GVHD that are FDA approved.  Given the side effect profiles after discussing in detail and given the least nephrotoxicity and expected  response, taking into account other metabolic effect as well.  We will proceed with prior authorization with belumosudil.  Patient was given material to review for possible expected adverse events and side effects with both Belumosodil and ruxolitinib.   --- Started on 10/18/2022 - skin/liver/oral GVHD inactive, and occular symptoms controlled/symptomatic improvement.   --- 10/10/2023 will hold belumosodil given recurrent infections ans significant improvement in symptoms with no end organ damage after discussions with him and his wife. Will optimize topical treatment with scleral lenses, eyedrops ans dex s/sp int he interim to avoid flares. Will readdress need to restart systemic treatment pending infectious resolution and symptoms.      5.  ID: recent fever in immunocompromised host; mild leukocytosis; blood cx, RVP, covid pending    # history of Epidural abscess: Followed by Dr. Candelario ID, plan to be on IV ceftriaxone for at least 6 weeks course through 5/11/2023-to be determined on further follow-up.  See imaging with MRI follow-up as above.     #Recurrent discitis/OM still on treatment through October 2023, cx negative, managed with ID locally.  3/30/2023 blood culture with Staph epidermidis  3/31/2023 BONE, L5, FLUOROSCOPICALLY-GUIDED NEEDLE BIOPSY: Benign bone with trilineage hematopoiesis (normal) Negative for neoplasm Negative for acute osteomyelitis. DISC, L5-S1, ASPIRATION FOR CYTOLOGY: Acellular debris; no cellular material present for evaluation     #History of COVID x2 last 1/2023 and influenza    Treated with Paxlovid with persistent fatigue but no active respiratory symptoms    #History of pulmonary infiltrates:   4/20 CT chest with new RUL infiltrate. Highly immunocompromised. 4/22 Fungitell/asp GM were neg. BAL 4/29 NGTD, increased micafungin to 150mg IV daily-- f/u 6/1 Dr Garcia CT with mixed results, followed with Dr. Garcia August 2022 with resolution of pulmonary nodules and normalization of LFTs  switched patient to posaconazole prophylactic dose and monitor LFTs and any infectious concerns closely. Monitor and low threshold check CT or add back if on higher dose steroids.     #History of CMV viremia:    - CMV viremia up to 1100. 4/15 started valcyte 900mg PO BID. 4/20 CMV <137, 5/10 ND, decreased to 450mg BID 4/30 - continue 4 weeks as long as CMV <137 till 5/28 + weekly CMV. Switched to acyclovir after completion of therapy 5/28/2022. recheck 3/1/23 ND    # History of Oral herpes  12/2023:HSV oral lesions; PCR positive. Stopped ACV last month and lesions appears soon thereafter. Empirically Rx Valtrex. mild lingering URI sx; RVP + rhino (12/21); supportive cares. (CCT 12/1/23 neg).started on pred 40mg/d.  Completed Valtrex course and put back on acyclovir restarted 1/20/2024    # History of PNA/bronchitis 2/9/2024 sp doxycyline    # History of RSV PNA 3/26/2024, s/p ribavirin    #Hypogammaglobulinemia with recurrent infections: maintain IgG > 400       - PPx:   ---ACV  Patient developed oral herpes reactivation after stopping acyclovir therefore resumed on 1/9/2023  ---Posaconazole (previously on micafungin with LFts abl, hx of pulmonary nodules needign treatment dose). 11/14/2023 discontinue and check CT in 2 months given history completed December 2023 with no concerns for infections or nodular lesions. Monitor and low threshold check CT or add back if on higher dose steroids.  ---Pentamidine, future 4/22/2024  ---On Penicillin 250 bid px     - EBV viremia: 4/20 CAP CT (w/ contrast): No adenopathy. S/p 4/22 and 5/4/22 rituximab 375mg/m2 5/10 ND x2, 3/13/2024     - vaccinations: Previously deferred annual vaccines in the setting of GVHD and immunosuppression therapy. 11/14/2023 started vaccination series, including Shingrix, COVID-vaccine. 1/9/2023 Shingrix vaccine.      received his influenza/covid vaccine 8/24/2024 and RSV vaccine 8/13.     6. Endo:   - Hx of graves disease; On synthroid follows  with endocrine  - HLD: 11/2022 cholesterol 355, triglycerides 153, -- 11/8 started rosuvastatin 5 mg daily, 11/22 CPK within normal, 5/2/2023 repeat lipid profile cholesterol down to 202, triglycerides 191, LDL now normal at 95.  We will continue same dose of rosuvastatin and add fish oil 1g BID (on hold). Repeated August with PCP, who added back fish oil    7. CV:   - Hypertension history   - TTE most recently 10/2022, ECHO in January 2024 at [facility]     8. GI:   - Omeprazole for heartburn/ GI protection (steroids)  - LFTs as above, now normal. Off ursodiol     9. Psych:   - Situational anxiety - lexapro 10mg daily.   - Insomnia: worse on steroids. Ativan, trazadone, melatonin     10. MSK:   -History of left food drop, PT. Occasional muscle cramps discussed optimizing vitamin D levels and considering vitamin D, some of the symptomatology of muscle cramps are likely related to chronic GVHD.  No muscle cramps reported today.  -4/2023 new tearing of the right acetabular labrum anterosuperiorly referred to to orthopedic surgery.       11. HCM/Age appropriate cancer screening:  -Discussed with the patient importance of age-appropriate cancer screening including colonoscopies, he is due for this.  -Discussed importance of at least once a year vitamin D level check, 8/18/2022 wnl  -Screening for secondary iron overload, see heme section above  -Yearly TSH 2022 wnl; yearly lipid panel - see GI section above  -9/6/2022 DEXA scan Based on BMD diagnosis is consistent with normal bone density based on WHO criteria Ref. 1   -PFTs 9/20/22, see above. 9/21/2023 PFTs The FVC, FEV1, FEV1/FVC ratio and DRZ96-33% are within normal limits. FVC 99% (108), FEV1 91%, DLCO uncorrected 91%.  Repeat PFTs in 4 to 6 months.  -Metabolic other, 8/2022 testosterone within normal  -11/22/2022 discussed with the patient the challenges and the stresses of chronic illness and healthcare fatigue.  Patient had previously been on Lexapro that we  restarted confirmed with pharmacy that there are no drug drug interactions.  Additionally we will referred patient to PMNR to help with physical rehab and strength to help with fatigue.  Our social workers will also reach out to Nik and his wife for further resources including support groups for patients with chronic illness and fatigue post transplant.  -Referral to palliative care for symptom and pain management including insomnia     Summary: I recommended that he reconsider a sleep evaluation because of the potential effect of sleep apnea on sleep quality and the resulting effect on daytime sleepiness and fatigue.  He is open to the idea although somewhat resistant to using the device.  I pointed out that there may be new devices or other ways to make this more tolerable.  He had difficulty with prednisone doses lower than 15 mg and we agreed to continue the current dosing.    Plan:  - hold norvasc tonight; take BP in AM and will address then  - f/up covid/RVP/bld cx  - call sooner if you feel lightheaded or with new medical concerns  - push PO fluids    - continue prednisone 15 mg daily along with dex s/s QID  - RTC to see Dr. Cote on 10/18/24   - IVIG if IgG lower than 400, level ordered for 10/18  - continue follow up for endo, neph, PCP      The longitudinal plan of care for the diagnosis(es)/condition(s) as documented were addressed during this visit. Due to the added complexity in care, I will continue to support Nik in the subsequent management and with ongoing continuity of care.      40 minutes spent on the date of the encounter doing chart review, history and exam, lab review, documentation and coordniating care.    Lashanda Figueroa pa-c  [phone]        Again, thank you for allowing me to participate in the care of your patient.        Sincerely,        BMT Advanced Practice Provider

## 2024-10-15 NOTE — NURSING NOTE
"Chief Complaint   Patient presents with    Blood Draw     Labs drawn via port and  by RN. Port accessed with 20g 3/4\" power needle and vitals WNL. Flushed with saline and heparin. Pt tolerated well. Patient checked into next appointment.       Swabs collected as well.    Pat Larios RN    "

## 2024-10-15 NOTE — NURSING NOTE
"Oncology Rooming Note    October 15, 2024 12:21 PM   Nik Nava is a 64 year old male who presents for:    Chief Complaint   Patient presents with    Blood Draw     Labs drawn via port and  by RN. Port accessed with 20g 3/4\" power needle and vitals WNL. Flushed with saline and heparin. Pt tolerated well. Patient checked into next appointment.       Initial Vitals: /73   Pulse 86   Temp 98.8  F (37.1  C) (Oral)   SpO2 97%  Estimated body mass index is 34.58 kg/m  as calculated from the following:    Height as of 9/23/24: 1.854 m (6' 1\").    Weight as of 9/23/24: 118.9 kg (262 lb 1.6 oz). There is no height or weight on file to calculate BSA.  Mild Pain (2) Comment: Data Unavailable   No LMP for male patient.  Allergies reviewed: Yes  Medications reviewed: Yes    Medications: Medication refills not needed today.  Pharmacy name entered into [facility]:    [facility] - [place], [place] - [address] [facility] [place], [place] - [address] [place] [address] [phone]  [facility]  [facility] - [place], [place] - [address] [place] [address]    Frailty Screening:   Is the patient here for a new oncology consult visit in cancer care? 2. No      Clinical concerns: Fevers, chills, fatigue.      Pat Larios RN              "

## 2024-10-16 LAB — IGG SERPL-MCNC: 587 MG/DL (ref 610–1616)

## 2024-10-17 ENCOUNTER — CARE COORDINATION (OUTPATIENT)
Dept: TRANSPLANT | Facility: CLINIC | Age: 64
End: 2024-10-17
Payer: MEDICARE

## 2024-10-17 NOTE — PROGRESS NOTES
Nik and Wife called giving an update on how Nik is feeling. He still has a temp 99s-100, otherwise feeling okay. Blood cultures completed 10/15. Nik has labs and a visit with Dr. Cote tomorrow. Nik will call if temp trends up or starts to feel worse. Wife mentioned Nik has a bruise on R. Winter that has not been healing. Recommended that this should be brought up tomorrow with the visit with Dr. Cote.

## 2024-10-18 ENCOUNTER — ONCOLOGY VISIT (OUTPATIENT)
Dept: TRANSPLANT | Facility: CLINIC | Age: 64
End: 2024-10-18
Attending: INTERNAL MEDICINE
Payer: MEDICARE

## 2024-10-18 ENCOUNTER — APPOINTMENT (OUTPATIENT)
Dept: LAB | Facility: CLINIC | Age: 64
End: 2024-10-18
Attending: INTERNAL MEDICINE
Payer: MEDICARE

## 2024-10-18 VITALS
OXYGEN SATURATION: 97 % | SYSTOLIC BLOOD PRESSURE: 126 MMHG | DIASTOLIC BLOOD PRESSURE: 78 MMHG | HEART RATE: 87 BPM | WEIGHT: 265.2 LBS | TEMPERATURE: 98.8 F | BODY MASS INDEX: 34.99 KG/M2 | RESPIRATION RATE: 16 BRPM

## 2024-10-18 DIAGNOSIS — D89.813 GVHD AS COMPLICATION OF BONE MARROW TRANSPLANT (H): ICD-10-CM

## 2024-10-18 DIAGNOSIS — D89.813 GVHD AS COMPLICATION OF BONE MARROW TRANSPLANT (H): Primary | ICD-10-CM

## 2024-10-18 DIAGNOSIS — T86.09 GVHD AS COMPLICATION OF BONE MARROW TRANSPLANT (H): ICD-10-CM

## 2024-10-18 DIAGNOSIS — T86.09 GVHD AS COMPLICATION OF BONE MARROW TRANSPLANT (H): Primary | ICD-10-CM

## 2024-10-18 LAB
ALBUMIN SERPL BCG-MCNC: 4 G/DL (ref 3.5–5.2)
ALP SERPL-CCNC: 93 U/L (ref 40–150)
ALT SERPL W P-5'-P-CCNC: 20 U/L (ref 0–70)
ANION GAP SERPL CALCULATED.3IONS-SCNC: 10 MMOL/L (ref 7–15)
AST SERPL W P-5'-P-CCNC: 25 U/L (ref 0–45)
BASOPHILS # BLD AUTO: 0.1 10E3/UL (ref 0–0.2)
BASOPHILS NFR BLD AUTO: 0 %
BILIRUB SERPL-MCNC: 0.6 MG/DL
BUN SERPL-MCNC: 36.6 MG/DL (ref 8–23)
CALCIUM SERPL-MCNC: 10 MG/DL (ref 8.8–10.4)
CD3 CELLS # BLD: 1673 CELLS/UL (ref 603–2990)
CD3 CELLS NFR BLD: 62 % (ref 49–84)
CD3+CD4+ CELLS # BLD: 496 CELLS/UL (ref 441–2156)
CD3+CD4+ CELLS NFR BLD: 18 % (ref 28–63)
CD3+CD4+ CELLS/CD3+CD8+ CLL BLD: 0.43 % (ref 1.4–2.6)
CD3+CD8+ CELLS # BLD: 1156 CELLS/UL (ref 125–1312)
CD3+CD8+ CELLS NFR BLD: 43 % (ref 10–40)
CHLORIDE SERPL-SCNC: 99 MMOL/L (ref 98–107)
CREAT SERPL-MCNC: 1.27 MG/DL (ref 0.67–1.17)
CRYPTOC AG SPEC QL: NEGATIVE
EGFRCR SERPLBLD CKD-EPI 2021: 62 ML/MIN/1.73M2
EOSINOPHIL # BLD AUTO: 0.2 10E3/UL (ref 0–0.7)
EOSINOPHIL NFR BLD AUTO: 1 %
ERYTHROCYTE [DISTWIDTH] IN BLOOD BY AUTOMATED COUNT: 15.1 % (ref 10–15)
GLUCOSE SERPL-MCNC: 110 MG/DL (ref 70–99)
HCO3 SERPL-SCNC: 24 MMOL/L (ref 22–29)
HCT VFR BLD AUTO: 35.5 % (ref 40–53)
HGB BLD-MCNC: 12.3 G/DL (ref 13.3–17.7)
IMM GRANULOCYTES # BLD: 0.1 10E3/UL
IMM GRANULOCYTES NFR BLD: 1 %
L PNEUMO1 AG UR QL IA: NEGATIVE
LYMPHOCYTES # BLD AUTO: 2.7 10E3/UL (ref 0.8–5.3)
LYMPHOCYTES NFR BLD AUTO: 20 %
MCH RBC QN AUTO: 33.6 PG (ref 26.5–33)
MCHC RBC AUTO-ENTMCNC: 34.6 G/DL (ref 31.5–36.5)
MCV RBC AUTO: 97 FL (ref 78–100)
MONOCYTES # BLD AUTO: 1.2 10E3/UL (ref 0–1.3)
MONOCYTES NFR BLD AUTO: 9 %
NEUTROPHILS # BLD AUTO: 9.6 10E3/UL (ref 1.6–8.3)
NEUTROPHILS NFR BLD AUTO: 70 %
NRBC # BLD AUTO: 0 10E3/UL
NRBC BLD AUTO-RTO: 0 /100
PATH INTERP SPEC-IMP: NORMAL
PLATELET # BLD AUTO: 214 10E3/UL (ref 150–450)
POTASSIUM SERPL-SCNC: 4.5 MMOL/L (ref 3.4–5.3)
PROT SERPL-MCNC: 6.9 G/DL (ref 6.4–8.3)
RBC # BLD AUTO: 3.66 10E6/UL (ref 4.4–5.9)
S PNEUM AG SPEC QL: NEGATIVE
SODIUM SERPL-SCNC: 133 MMOL/L (ref 135–145)
SPECIMEN TYPE: NORMAL
T CELL COMMENT: ABNORMAL
WBC # BLD AUTO: 13.9 10E3/UL (ref 4–11)

## 2024-10-18 PROCEDURE — 82784 ASSAY IGA/IGD/IGG/IGM EACH: CPT | Performed by: INTERNAL MEDICINE

## 2024-10-18 PROCEDURE — 86618 LYME DISEASE ANTIBODY: CPT | Performed by: INTERNAL MEDICINE

## 2024-10-18 PROCEDURE — 86359 T CELLS TOTAL COUNT: CPT

## 2024-10-18 PROCEDURE — 250N000011 HC RX IP 250 OP 636: Performed by: INTERNAL MEDICINE

## 2024-10-18 PROCEDURE — 82040 ASSAY OF SERUM ALBUMIN: CPT | Performed by: INTERNAL MEDICINE

## 2024-10-18 PROCEDURE — 85025 COMPLETE CBC W/AUTO DIFF WBC: CPT | Performed by: INTERNAL MEDICINE

## 2024-10-18 PROCEDURE — 36591 DRAW BLOOD OFF VENOUS DEVICE: CPT

## 2024-10-18 PROCEDURE — 36591 DRAW BLOOD OFF VENOUS DEVICE: CPT | Performed by: INTERNAL MEDICINE

## 2024-10-18 PROCEDURE — 87449 NOS EACH ORGANISM AG IA: CPT

## 2024-10-18 PROCEDURE — G0463 HOSPITAL OUTPT CLINIC VISIT: HCPCS | Performed by: INTERNAL MEDICINE

## 2024-10-18 PROCEDURE — G2211 COMPLEX E/M VISIT ADD ON: HCPCS | Performed by: INTERNAL MEDICINE

## 2024-10-18 PROCEDURE — 87899 AGENT NOS ASSAY W/OPTIC: CPT | Performed by: INTERNAL MEDICINE

## 2024-10-18 PROCEDURE — 87899 AGENT NOS ASSAY W/OPTIC: CPT

## 2024-10-18 PROCEDURE — 99214 OFFICE O/P EST MOD 30 MIN: CPT | Performed by: INTERNAL MEDICINE

## 2024-10-18 PROCEDURE — 87305 ASPERGILLUS AG IA: CPT

## 2024-10-18 RX ORDER — DOXYCYCLINE 100 MG/1
100 CAPSULE ORAL 2 TIMES DAILY
Qty: 14 CAPSULE | Refills: 0 | Status: SHIPPED | OUTPATIENT
Start: 2024-10-18

## 2024-10-18 RX ORDER — HEPARIN SODIUM (PORCINE) LOCK FLUSH IV SOLN 100 UNIT/ML 100 UNIT/ML
5 SOLUTION INTRAVENOUS ONCE
Status: COMPLETED | OUTPATIENT
Start: 2024-10-18 | End: 2024-10-18

## 2024-10-18 RX ORDER — HEPARIN SODIUM (PORCINE) LOCK FLUSH IV SOLN 100 UNIT/ML 100 UNIT/ML
500 SOLUTION INTRAVENOUS ONCE
Status: COMPLETED | OUTPATIENT
Start: 2024-10-18 | End: 2024-10-18

## 2024-10-18 RX ADMIN — Medication 5 ML: at 12:33

## 2024-10-18 RX ADMIN — Medication 500 UNITS: at 10:46

## 2024-10-18 ASSESSMENT — PAIN SCALES - GENERAL: PAINLEVEL: MILD PAIN (2)

## 2024-10-18 NOTE — LETTER
10/18/2024      Nik Nava  [id number] [facility] [id number]      Dear Colleague,    Thank you for referring your patient, Nik Nava, to the [facility] [place]. Please see a copy of my visit note below.      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 Recent oral HSV and oral cGVHD flare treated with prednisone.       HPI: Nik returns today feeling somewhat poorly.  Beginning a week ago, he began having low-grade fevers, malaise, and some decreased appetite.  He has no respiratory complaints, no shortness of breath, new cough, and his GVHD is under good control.  Previously his mouth was more bothersome but this is better as well.  He has no localizing symptoms.  [place]  He has been in the woods, walking on trails, and being in the outdoors.  He does not remember tick bites or any arthropod encounters    Review of Systems                                                                                                                                         10 point Review of systems was negative except as detailed above     PHYSICAL EXAM                                                                                                                                                   KPS: 70  Blood pressure 126/78, pulse 87, temperature 98.8  F (37.1  C), temperature source Oral, resp. rate 16, weight 120.3 kg (265 lb 3.2 oz), SpO2 97%.    Repeat VS taken reported in flow sheet    Wt Readings from Last 4 Encounters:   10/18/24 120.3 kg (265 lb 3.2 oz)   09/23/24 118.9 kg (262 lb 1.6 oz)   09/09/24 118.8 kg (261 lb 14.4 oz)   08/05/24 117.8 kg (259 lb 9.6 oz)      General: NAD.  HEENT: sclera anicteric.  Lichenoid changes and associated erythema of b/l buccal mucosa.  No active ulceration.  Lungs:  CTA b/l  no  wheezes  CV: RRR  no gallop  Abd; soft, NABS, protuberant  Ext/ Skin: Skin bruising on bilateral forearms,  fainting of previous hyperpigmented areas of previously known cGVHD      cGVHD therapy started on February 24 2022  - Baseline NIH scoring 2/24/2022 : skin 2 maculopapular rash/erythema with no sclerotic features, mouth score 1 mild symptoms with lichenoid changes less than 25%, eyes score 2 moderate symptoms without new visual impairments due to KCS requiring lubricant eyedrops more than 3 times a day, overall mild scale for her provider  - 3/25/2022 1 month NIH treatment assessment on PQRST: skin 2, mouth score 1 mild symptoms with NO lichenoid changes, eyes score 2 moderate symptoms w requiring lubricant eyedrops more than 3 times a day, liver score 1 ALT 3.7x ULN and nl bilirubin   - 3/31  Mouth clear; eyes minimal LFT still abn; no joint or new GI sx  - 4/28 Mouth clear, Left>R eye is mild sclera erythema, lids are pink and irritated appearing, LFT's slow improvement, no new joint, skin, or GI symptoms.   -5/11 mouth with mild erythema but no lichenoid changes, eyes using eyedrops 4-6 times a day score of 2, LFTs significantly improved from baseline slight elevation in alk phos and AST with normal bilirubin, skin with hyperpigmentation from old acute GVHD no active skin rashes, no GI symptoms no musculoskeletal complaints.  -5/26 Mouth with very slight lichenoid changes, erythema.  Eyes still using eyedrops 4-6x per day.  LFTs essentially normal with slight elevation in alk phos.  Skin with hyperpigmentation from old acute GVHD, no active rashes, no GI or MSK concerns.  Skin 0, mouth 0 but with lichenoid changes, eyes 2  -6/7/22 Mouth with no lichenoid changes, erythema.  Eyes still using eyedrops 4-6x per day.  LFTs essentially normal with slight elevation in alk phos.  Skin with hyperpigmentation from old acute GVHD, no active rashes, no GI or MSK concerns.  Skin 0, mouth 0, eyes 2     -6 month PQRST  assessment 9/6/2022: eyes 3, mouth 0 for symptoms, 25% lichenoid changes (1), liver/GI/skin 0    11/8/2022, 11/22/2022, 12/13/22 cGVHD NIG scoring eyes 3, no other organ involvement clinically  3/28/23 cGVHD NIG scoring eyes 3, no other organ involvement clinically  5/2/23 cGVHD NIG scoring eyes 3, oral 1, no other organ involvement clinically  6/13/23 cGVHD NIG scoring eyes 3, oral 1, no other organ involvement clinically  8/15/23 cGVHD NIG scoring eyes 3 (down to 3-4 times eye drop from >10 but still using scleral lenses), oral 1, no other organ involvement clinically  10/10/2023 cGVHD NIG scoring eyes 3 (down to 3-4 times eye drop from >10 but still using scleral lenses), oral 1, no other organ involvement clinically  11/14/2023 cGVHD NIG scoring eyes 3 (down to 3-4 times eye drop from >10 but still using scleral lenses), oral 1, no other organ involvement clinically  1/9/2024 cGVHD NIG scoring eyes 3 (down to 3-4 times eye drop from >10 but still using scleral lenses), oral 2, no other organ involvement clinically  2/27/2024 cGVHD NIG scoring eyes 3 (down to 3-4 times eye drop from >10 but still using scleral lenses), oral 1, no other organ involvement clinically  4/22024 cGVHD NIG scoring eyes 3 (down to 2 times eye drop from >10 but still using scleral lenses), oral 1, no other organ involvement clinically  8/5/2024 cGVHD NIH scoring eyes 2 (down to 2 times eye drop from >10 but still using scleral lenses), oral 0, no other organ involvement clinically    Lab:      Blood Counts       Recent Labs   Lab Test 10/18/24  1042 10/15/24  1202 09/09/24  0951   HGB 12.3* 13.0* 12.8*   HCT 35.5* 38.6* 37.2*   WBC 13.9* 11.5* 8.7   ANEUTAUTO 9.6* 8.1 4.9   ALYMPAUTO 2.7 2.0 2.8   AMONOAUTO 1.2 1.1 0.9   AEOSAUTO 0.2 0.2 0.1   ABSBASO 0.1 0.1 0.0   NRBCMAN 0.0 0.0 0.0    205 238           Chemistries     Basic Panel  Recent Labs   Lab Test 10/18/24  1042 10/15/24  1202 09/09/24  0951   * 138 139   POTASSIUM  4.5 4.4 4.6   CHLORIDE 99 102 102   CO2 24 25 25   BUN 36.6* 30.3* 40.5*   CR 1.27* 1.34* 1.25*   * 123* 115*        Calcium, Magnesium, Phosphorus  Recent Labs   Lab Test 10/18/24  1042 10/15/24  1202 09/09/24  0951 08/05/24  0938 07/09/24  1250 02/27/24  1323 02/20/24  1507 12/21/23  1136 11/27/23  0753 11/14/23  1234 11/14/23  1233 10/10/23  1143 08/15/23  1316   DAMON 10.0 9.7 9.8   < > 9.7   < > 10.1   < > 10.0 10.1   < > 10.2 10.0   MAG  --   --   --   --   --   --   --   --   --  2.0  --  1.9 2.0   PHOS  --   --   --   --  3.4  --  3.4  --  3.3 3.6  --   --   --     < > = values in this interval not displayed.        LFTs  Recent Labs   Lab Test 10/18/24  1042 10/15/24  1202 09/09/24  0951   BILITOTAL 0.6 0.7 0.7   ALKPHOS 93 93 91   AST 25 26 35   ALT 20 18 27   ALBUMIN 4.0 3.9 4.3       LDH  Recent Labs   Lab Test 04/06/22  0947   *         ImmuneSuppression     Sirolimus  Recent Labs   Lab Test 10/14/22  1229 10/11/22  1519 10/03/22  0755   RAPAMY 3.5* 7.0 11.0          ASSESSMENT AND PLAN   Nik Nava is a 64 year old male with Ph Pos ALL, day 1165 s/p sib allo stem cell transplant    Day -6 (8/4): flu/cy  Day -5 through day -2 (8/5-8/8): flu  Day -1 (8/9): TBI  Day 0 (8/10): transplant     1.  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 S/p allo sib PBSCT. (ABO matched)  HCT-CI score: 3 (prior solid tumor)  Day +100 bone marrow biopsy is 100% donor with no morphologic or flow cytometric evidence of leukemia BCR abl is pending.  - Day +180 restaging BM bx- still in CR  100% donor;  2/16/22 BCR ABL major breakpoint undetectable.   - 1 year restaging 8/18/2022: Markedly hypocellular bone marrow [less than 10% cellular] with maturing trilineage hematopoiesis and no definite morphologic or immunophenotypic evidence for involvement by B lymphoblastic leukemia. BCRABL1 PCR not detected, bone marrow % donor. FISH NORMAL No evidence of BCR-ABL1 fusion  -  Maintenance with TKI posttransplantation given his Ph positive ALL that have not been started previously presumed secondary to multiple active issues specifically infections and COVID.  Per Avila Tobar: will reconsider this patient will think about whether this is something he would like to consider he was previously on Dasatinib, we would start on a low dose and increase as tolerated.  This is on hold now pending active GVHD therapy, we discussed on this visit 10/18 in agreement with holding off.  - 2 year restaging 8/7/2023 BMB: - No morphologic or immunophenotypic evidence of recurrent/persistent B-lymphoblastic leukemia. Slightly hypocellular marrow (10-20% estimated cellularity, patchy and variable), with trilineage hematopoetiic maturation and less than 2% blasts. Peripheral blood showing slight normocytic normochromic anemia and slight thrombocytopenia. BM % donor. FISH No evidence of BCR::ABL1 fusion /CG No recipient (male) cells  or cells with a t(9;22) or other clonal chromosomal abnormality were  detected among the 20 metaphases analyzed.      2.  HEME:   - mild leukocytosis noted-blood cx pending    Historical:  3/2023 iron low 28, iron saturation index 10%, transferrin 225, ferritin 1381 down trending (in retrospect that was the time of epidural abscess as well).  B12, folate normal.  High copper and zinc borderline low, 5/2023 started zinc.  Started iron replacement in 4/2023, recheck iron profile on follow up more consistent with AOCD, Iron 11/14/23 WNL. Note ferritin elevation is in the setting of discitis/OM, 4/2 down to 1500 but recovering from RSV with nl iron profile, low threshold to obtian ferriscan to objectively assess for iron overload.     3.  FEN/Renal: prerenal azotemia--push oral fluids; s/p nephrectormy, nephrology following     4.    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   8/5/24: Prednisone 15/10  8/28: Pred increased back 15 mg daily, dex s/s QID      #Skin GVHD- history of biopsy proven GVHD of the skin 8/30/2021; resolved.   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 WNL today   # Ocular GVHD: follows with ophthalmology. Maxitrol to lids, continue refreshing drops QID. Score 3, but down to 2 times eye drops from >10/day, using scleral lenses  Followed closely by Dr. Juarez with significant improvement with scleral lenses and eyedrops  # Oral GVHD: pred increase as above, dex s/s QID. Lichenoid changes prominent L-buccal mucosa. Good pain control during appointment on 9/9/24.     Previous therapies  Tacrolimus-previously decided to stay on same dose, no checks of levels if clinically stable, and no need switch to sirolimus. (keep tac low as gvhd is quiet and viremia). 5/10 level 45 with starting of COVID therapy and D-D intractions (Paxlovid for COVID19, which has a drug interaction with tacrolimus-Ritonavir increases serum levels of tacrolimus).   Tacrolimus level subtherapeutic, increase to 2.5 mg twice daily recently, 8/23/2022 instructed to change tacrolimus to 2 mg twice daily. 9/6 with mild NEGRA, hyperkalemia, hypomagnesemia, and reports some tremors and cramps, suspect tacrolimus to be high therefore empirically decreased to 1.5 twice daily, skip morning dose of tacrolimus and took 2 mg today after his afternoon labs. Decrease to 1mg BID on Saturday.  Sirolimus  9/21 despite fluids and a dose adjustment of tacrolimus patient seem to have persistence NORBERTO related NEGRA, therefore after discussion with the patient decision was made to transition to sirolimus that was started on 9/21, patient initially seem to tolerate this well with normalization of kidney function  10/11 presented with flares of ocular and oral GVHD, fluid retention and gain of 5-6 kg, lower extremity edema, abdominal distention, total protein to creatinine ratio elevated at 0.4, in addition to elevation in BUN and creatinine, HTN.  Picture  most consistent with sirolimus related side effects.  Less likely that the patient will tolerate even a lower level of sirolimus.  Therefore the patient was instructed to stop sirolimus, last dose on 10/10. 10/14 level 3.5 appropriately trending down.     Corticosteroids  3/1-3/16: prednisone 100mg every day  3/17 75mg every day   3/31 75 alt with 50  4/6: 75 alt with 25mg every other day  4/12 pred tapered to 60 alternating with 25mg   4/15 In setting of viruses trying to reactivate,GVHD stable: Taper 60/15mg alternating.  4/20 decrease pred further to 50/10.    4/25 taper pred to 50/0 every other day as long as symptoms stable.   5/2 Pred decrease 40/0 alternating every other day.   5/13 decrease to 30/0  5/20 decrease to 20/0 then slowly by 5 mg weekly  6/7 decrease to 10/0  Went up to 15/0 with mild increased LFTs  8/23/2022 increased to 20/0, with persistence of oral ulcers and active ocular GVHD.  Discussed with the patient the importance of stopping chewing of nicotine gums for prolonged hours as that would not help with the healing of the oral ulcers.  I discussed with the patient alternatives of nicotine patches that he will reconsider and let me know.  9/6 decreased to 15 alternating with 0  9/13 decreased to 10 alternating with 0  9/21 discontinued tacrolimus given persistent NEGRA and single kidney and start the patient on sirolimus without loading dose.  We will get a list of possible side effects and adverse events from the Pharm.D. see sirolimus section above.  10/11 GVHD flare. Sirolimus stopped. Resume prednisone 40 mg daily as of 10/12, no change with mildly worsening eye pain 10/14  Reduce pred to 35mg 10/25 and 25mg 11/1 11/8 20 mg   11/22 15 mg  12/13/22 10 mg (plan to taper to 5mg then slow taper 1 mg per month given long pred history)  2/23/23 lower from 5 mg to 4 mg for the next month  3/28/2023 - 5/2/2023- 8/15/2023 continue on 10/4 given adrenal insufficieny with recent am cortisol check and  clinical symptoms on taper to lower dose of 4 mg daily  -12/2023 had oral GVHD flare started on Prednisone 40 mg,  start taper to 40 and 30 mg alternating for 1 week then 30 mg daily for 1 week, then 30 and 20 alternating for 1 week, then 20 for 1 week till he sees me.  - 2/27/2024 decrease from 10/20mg prednisone to 10/15 then 10/10 -flared and now on 15mg with   - 4/2/2024  15mg  - 5/9/24 15/10  - 8/5/24 10 daily  - 9/9/24 15 mg daily ongoing    Belumosudil  Patient intolerant/with disease flares on tacrolimus and sirolimus see above.  Discussed with the patient the different options for therapy of chronic GVHD that are FDA approved.  Given the side effect profiles after discussing in detail and given the least nephrotoxicity and expected response, taking into account other metabolic effect as well.  We will proceed with prior authorization with belumosudil.  Patient was given material to review for possible expected adverse events and side effects with both Belumosodil and ruxolitinib.   --- Started on 10/18/2022 - skin/liver/oral GVHD inactive, and occular symptoms controlled/symptomatic improvement.   --- 10/10/2023 will hold belumosodil given recurrent infections ans significant improvement in symptoms with no end organ damage after discussions with him and his wife. Will optimize topical treatment with scleral lenses, eyedrops ans dex s/sp int he interim to avoid flares. Will readdress need to restart systemic treatment pending infectious resolution and symptoms.      5.  ID: recent fever in immunocompromised host; mild leukocytosis; blood cx, RVP, covid pending    # history of Epidural abscess: Followed by Dr. Candelario ID, plan to be on IV ceftriaxone for at least 6 weeks course through 5/11/2023-to be determined on further follow-up.  See imaging with MRI follow-up as above.     #Recurrent discitis/OM still on treatment through October 2023, cx negative, managed with ID locally.  3/30/2023 blood culture with  Staph epidermidis  3/31/2023 BONE, L5, FLUOROSCOPICALLY-GUIDED NEEDLE BIOPSY: Benign bone with trilineage hematopoiesis (normal) Negative for neoplasm Negative for acute osteomyelitis. DISC, L5-S1, ASPIRATION FOR CYTOLOGY: Acellular debris; no cellular material present for evaluation     #History of COVID x2 last 1/2023 and influenza    Treated with Paxlovid with persistent fatigue but no active respiratory symptoms    #History of pulmonary infiltrates:   4/20 CT chest with new RUL infiltrate. Highly immunocompromised. 4/22 Fungitell/asp GM were neg. BAL 4/29 NGTD, increased micafungin to 150mg IV daily-- f/u 6/1 Dr Garcia CT with mixed results, followed with Dr. Garcia August 2022 with resolution of pulmonary nodules and normalization of LFTs switched patient to posaconazole prophylactic dose and monitor LFTs and any infectious concerns closely. Monitor and low threshold check CT or add back if on higher dose steroids.     #History of CMV viremia:    - CMV viremia up to 1100. 4/15 started valcyte 900mg PO BID. 4/20 CMV <137, 5/10 ND, decreased to 450mg BID 4/30 - continue 4 weeks as long as CMV <137 till 5/28 + weekly CMV. Switched to acyclovir after completion of therapy 5/28/2022. recheck 3/1/23 ND    # History of Oral herpes  12/2023:HSV oral lesions; PCR positive. Stopped ACV last month and lesions appears soon thereafter. Empirically Rx Valtrex. mild lingering URI sx; RVP + rhino (12/21); supportive cares. (CCT 12/1/23 neg).started on pred 40mg/d.  Completed Valtrex course and put back on acyclovir restarted 1/20/2024    # History of PNA/bronchitis 2/9/2024 sp doxycyline    # History of RSV PNA 3/26/2024, s/p ribavirin    #Hypogammaglobulinemia with recurrent infections: maintain IgG > 400       - PPx:   ---ACV  Patient developed oral herpes reactivation after stopping acyclovir therefore resumed on 1/9/2023  ---Posaconazole (previously on micafungin with LFts abl, hx of pulmonary nodules needign treatment  dose). 11/14/2023 discontinue and check CT in 2 months given history completed December 2023 with no concerns for infections or nodular lesions. Monitor and low threshold check CT or add back if on higher dose steroids.  ---Pentamidine, future 4/22/2024  ---On Penicillin 250 bid px     - EBV viremia: 4/20 CAP CT (w/ contrast): No adenopathy. S/p 4/22 and 5/4/22 rituximab 375mg/m2 5/10 ND x2, 3/13/2024     - vaccinations: Previously deferred annual vaccines in the setting of GVHD and immunosuppression therapy. 11/14/2023 started vaccination series, including Shingrix, COVID-vaccine. 1/9/2023 Shingrix vaccine.      received his influenza/covid vaccine 8/24/2024 and RSV vaccine 8/13.     6. Endo:   - Hx of graves disease; On synthroid follows with endocrine  - HLD: 11/2022 cholesterol 355, triglycerides 153, -- 11/8 started rosuvastatin 5 mg daily, 11/22 CPK within normal, 5/2/2023 repeat lipid profile cholesterol down to 202, triglycerides 191, LDL now normal at 95.  We will continue same dose of rosuvastatin and add fish oil 1g BID (on hold). Repeated August with PCP, who added back fish oil    7. CV:   - Hypertension history   - TTE most recently 10/2022, ECHO in January 2024 at [facility]     8. GI:   - Omeprazole for heartburn/ GI protection (steroids)  - LFTs as above, now normal. Off ursodiol     9. Psych:   - Situational anxiety - lexapro 10mg daily.   - Insomnia: worse on steroids. Ativan, trazadone, melatonin     10. MSK:   -History of left food drop, PT. Occasional muscle cramps discussed optimizing vitamin D levels and considering vitamin D, some of the symptomatology of muscle cramps are likely related to chronic GVHD.  No muscle cramps reported today.  -4/2023 new tearing of the right acetabular labrum anterosuperiorly referred to to orthopedic surgery.       11. HCM/Age appropriate cancer screening:  -Discussed with the patient importance of age-appropriate cancer screening including  colonoscopies, he is due for this.  -Discussed importance of at least once a year vitamin D level check, 8/18/2022 wnl  -Screening for secondary iron overload, see heme section above  -Yearly TSH 2022 wnl; yearly lipid panel - see GI section above  -9/6/2022 DEXA scan Based on BMD diagnosis is consistent with normal bone density based on WHO criteria Ref. 1   -PFTs 9/20/22, see above. 9/21/2023 PFTs The FVC, FEV1, FEV1/FVC ratio and JDE54-14% are within normal limits. FVC 99% (108), FEV1 91%, DLCO uncorrected 91%.  Repeat PFTs in 4 to 6 months.  -Metabolic other, 8/2022 testosterone within normal  -11/22/2022 discussed with the patient the challenges and the stresses of chronic illness and healthcare fatigue.  Patient had previously been on Lexapro that we restarted confirmed with pharmacy that there are no drug drug interactions.  Additionally we will referred patient to PMNR to help with physical rehab and strength to help with fatigue.  Our social workers will also reach out to Nik and his wife for further resources including support groups for patients with chronic illness and fatigue post transplant.  -Referral to palliative care for symptom and pain management including insomnia     Summary: He is having low-grade fevers without any localizing symptoms, rash, or respiratory symptoms.  He is significantly immunocompromised and on chronic prednisone.  Given the lack of specific symptoms, I did recommend a more aggressive evaluation with a CT scan of the chest.  In addition given his time spent up in the woods, I recommended empiric therapy for tickborne illness and today will prescribe doxycycline.  Will send fungal serologies.    Plan:  - doxycycline for tick borne illness  - B1,3G, galactomannin, legionella, crypto  - CT chest  - Consider adding ruxo for pred sparing effect  - RTC 4 weeks    The longitudinal plan of care for the diagnosis(es)/condition(s) as documented were addressed during this visit. Due to  the added complexity in care, I will continue to support Nik in the subsequent management and with ongoing continuity of care.      30 minutes spent on the date of the encounter doing chart review, history and exam, lab review, documentation and coordniating care.    DOMINIQUE BRITTON MD  October 19, 2024          Again, thank you for allowing me to participate in the care of your patient.        Sincerely,        DOMINIQUE BRITTON MD

## 2024-10-18 NOTE — NURSING NOTE
Chief Complaint   Patient presents with    Blood Draw     Port blood draw by lab RN       Port accessed with blood draw and heparin flush by lab RN.     Natty Ceballos RN

## 2024-10-18 NOTE — PROGRESS NOTES
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 Recent oral HSV and oral cGVHD flare treated with prednisone.       HPI: Nik returns today feeling somewhat poorly.  Beginning a week ago, he began having low-grade fevers, malaise, and some decreased appetite.  He has no respiratory complaints, no shortness of breath, new cough, and his GVHD is under good control.  Previously his mouth was more bothersome but this is better as well.  He has no localizing symptoms.  [place]  He has been in the woods, walking on trails, and being in the outdoors.  He does not remember tick bites or any arthropod encounters    Review of Systems                                                                                                                                         10 point Review of systems was negative except as detailed above     PHYSICAL EXAM                                                                                                                                                   KPS: 70  Blood pressure 126/78, pulse 87, temperature 98.8  F (37.1  C), temperature source Oral, resp. rate 16, weight 120.3 kg (265 lb 3.2 oz), SpO2 97%.    Repeat VS taken reported in flow sheet    Wt Readings from Last 4 Encounters:   10/18/24 120.3 kg (265 lb 3.2 oz)   09/23/24 118.9 kg (262 lb 1.6 oz)   09/09/24 118.8 kg (261 lb 14.4 oz)   08/05/24 117.8 kg (259 lb 9.6 oz)      General: NAD.  HEENT: sclera anicteric.  Lichenoid changes and associated erythema of b/l buccal mucosa.  No active ulceration.  Lungs:  CTA b/l  no wheezes  CV: RRR  no gallop  Abd; soft, NABS, protuberant  Ext/ Skin: Skin bruising on bilateral forearms,  fainting of previous hyperpigmented areas of previously known cGVHD      cGVHD therapy started on February 24 2022  - Baseline NIH scoring 2/24/2022 : skin 2  maculopapular rash/erythema with no sclerotic features, mouth score 1 mild symptoms with lichenoid changes less than 25%, eyes score 2 moderate symptoms without new visual impairments due to KCS requiring lubricant eyedrops more than 3 times a day, overall mild scale for her provider  - 3/25/2022 1 month NIH treatment assessment on PQRST: skin 2, mouth score 1 mild symptoms with NO lichenoid changes, eyes score 2 moderate symptoms w requiring lubricant eyedrops more than 3 times a day, liver score 1 ALT 3.7x ULN and nl bilirubin   - 3/31  Mouth clear; eyes minimal LFT still abn; no joint or new GI sx  - 4/28 Mouth clear, Left>R eye is mild sclera erythema, lids are pink and irritated appearing, LFT's slow improvement, no new joint, skin, or GI symptoms.   -5/11 mouth with mild erythema but no lichenoid changes, eyes using eyedrops 4-6 times a day score of 2, LFTs significantly improved from baseline slight elevation in alk phos and AST with normal bilirubin, skin with hyperpigmentation from old acute GVHD no active skin rashes, no GI symptoms no musculoskeletal complaints.  -5/26 Mouth with very slight lichenoid changes, erythema.  Eyes still using eyedrops 4-6x per day.  LFTs essentially normal with slight elevation in alk phos.  Skin with hyperpigmentation from old acute GVHD, no active rashes, no GI or MSK concerns.  Skin 0, mouth 0 but with lichenoid changes, eyes 2  -6/7/22 Mouth with no lichenoid changes, erythema.  Eyes still using eyedrops 4-6x per day.  LFTs essentially normal with slight elevation in alk phos.  Skin with hyperpigmentation from old acute GVHD, no active rashes, no GI or MSK concerns.  Skin 0, mouth 0, eyes 2     -6 month PQRST assessment 9/6/2022: eyes 3, mouth 0 for symptoms, 25% lichenoid changes (1), liver/GI/skin 0    11/8/2022, 11/22/2022, 12/13/22 cGVHD NIG scoring eyes 3, no other organ involvement clinically  3/28/23 cGVHD NIG scoring eyes 3, no other organ involvement  clinically  5/2/23 cGVHD NIG scoring eyes 3, oral 1, no other organ involvement clinically  6/13/23 cGVHD NIG scoring eyes 3, oral 1, no other organ involvement clinically  8/15/23 cGVHD NIG scoring eyes 3 (down to 3-4 times eye drop from >10 but still using scleral lenses), oral 1, no other organ involvement clinically  10/10/2023 cGVHD NIG scoring eyes 3 (down to 3-4 times eye drop from >10 but still using scleral lenses), oral 1, no other organ involvement clinically  11/14/2023 cGVHD NIG scoring eyes 3 (down to 3-4 times eye drop from >10 but still using scleral lenses), oral 1, no other organ involvement clinically  1/9/2024 cGVHD NIG scoring eyes 3 (down to 3-4 times eye drop from >10 but still using scleral lenses), oral 2, no other organ involvement clinically  2/27/2024 cGVHD NIG scoring eyes 3 (down to 3-4 times eye drop from >10 but still using scleral lenses), oral 1, no other organ involvement clinically  4/22024 cGVHD NIG scoring eyes 3 (down to 2 times eye drop from >10 but still using scleral lenses), oral 1, no other organ involvement clinically  8/5/2024 cGVHD NIH scoring eyes 2 (down to 2 times eye drop from >10 but still using scleral lenses), oral 0, no other organ involvement clinically    Lab:      Blood Counts       Recent Labs   Lab Test 10/18/24  1042 10/15/24  1202 09/09/24  0951   HGB 12.3* 13.0* 12.8*   HCT 35.5* 38.6* 37.2*   WBC 13.9* 11.5* 8.7   ANEUTAUTO 9.6* 8.1 4.9   ALYMPAUTO 2.7 2.0 2.8   AMONOAUTO 1.2 1.1 0.9   AEOSAUTO 0.2 0.2 0.1   ABSBASO 0.1 0.1 0.0   NRBCMAN 0.0 0.0 0.0    205 238           Chemistries     Basic Panel  Recent Labs   Lab Test 10/18/24  1042 10/15/24  1202 09/09/24  0951   * 138 139   POTASSIUM 4.5 4.4 4.6   CHLORIDE 99 102 102   CO2 24 25 25   BUN 36.6* 30.3* 40.5*   CR 1.27* 1.34* 1.25*   * 123* 115*        Calcium, Magnesium, Phosphorus  Recent Labs   Lab Test 10/18/24  1042 10/15/24  1202 09/09/24  0951 08/05/24  0938 07/09/24  1250  02/27/24  1323 02/20/24  1507 12/21/23  1136 11/27/23  0753 11/14/23  1234 11/14/23  1233 10/10/23  1143 08/15/23  1316   DAMON 10.0 9.7 9.8   < > 9.7   < > 10.1   < > 10.0 10.1   < > 10.2 10.0   MAG  --   --   --   --   --   --   --   --   --  2.0  --  1.9 2.0   PHOS  --   --   --   --  3.4  --  3.4  --  3.3 3.6  --   --   --     < > = values in this interval not displayed.        LFTs  Recent Labs   Lab Test 10/18/24  1042 10/15/24  1202 09/09/24  0951   BILITOTAL 0.6 0.7 0.7   ALKPHOS 93 93 91   AST 25 26 35   ALT 20 18 27   ALBUMIN 4.0 3.9 4.3       LDH  Recent Labs   Lab Test 04/06/22  0947   *         ImmuneSuppression     Sirolimus  Recent Labs   Lab Test 10/14/22  1229 10/11/22  1519 10/03/22  0755   RAPAMY 3.5* 7.0 11.0          ASSESSMENT AND PLAN   Nik Nava is a 64 year old male with Ph Pos ALL, day 1165 s/p sib allo stem cell transplant    Day -6 (8/4): flu/cy  Day -5 through day -2 (8/5-8/8): flu  Day -1 (8/9): TBI  Day 0 (8/10): transplant     1.  Acute lymphoblastic leukemia, [place] chromosome positive in CR, MRD neg. S/p allo sib PBSCT. (ABO matched)  HCT-CI score: 3 (prior solid tumor)  Day +100 bone marrow biopsy is 100% donor with no morphologic or flow cytometric evidence of leukemia BCR abl is pending.  - Day +180 restaging BM bx- still in CR  100% donor;  2/16/22 BCR ABL major breakpoint undetectable.   - 1 year restaging 8/18/2022: Markedly hypocellular bone marrow [less than 10% cellular] with maturing trilineage hematopoiesis and no definite morphologic or immunophenotypic evidence for involvement by B lymphoblastic leukemia. BCRABL1 PCR not detected, bone marrow % donor. FISH NORMAL No evidence of BCR-ABL1 fusion  - Maintenance with TKI posttransplantation given his Ph positive ALL that have not been started previously presumed secondary to multiple active issues specifically infections and COVID.  Per Avila Tboar: will reconsider this patient will think about whether  this is something he would like to consider he was previously on Dasatinib, we would start on a low dose and increase as tolerated.  This is on hold now pending active GVHD therapy, we discussed on this visit 10/18 in agreement with holding off.  - 2 year restaging 8/7/2023 BMB: - No morphologic or immunophenotypic evidence of recurrent/persistent B-lymphoblastic leukemia. Slightly hypocellular marrow (10-20% estimated cellularity, patchy and variable), with trilineage hematopoetiic maturation and less than 2% blasts. Peripheral blood showing slight normocytic normochromic anemia and slight thrombocytopenia. BM % donor. FISH No evidence of BCR::ABL1 fusion /CG No recipient (male) cells  or cells with a t(9;22) or other clonal chromosomal abnormality were  detected among the 20 metaphases analyzed.      2.  HEME:   - mild leukocytosis noted-blood cx pending    Historical:  3/2023 iron low 28, iron saturation index 10%, transferrin 225, ferritin 1381 down trending (in retrospect that was the time of epidural abscess as well).  B12, folate normal.  High copper and zinc borderline low, 5/2023 started zinc.  Started iron replacement in 4/2023, recheck iron profile on follow up more consistent with AOCD, Iron 11/14/23 WNL. Note ferritin elevation is in the setting of discitis/OM, 4/2 down to 1500 but recovering from RSV with nl iron profile, low threshold to obtian ferriscan to objectively assess for iron overload.     3.  FEN/Renal: prerenal azotemia--push oral fluids; s/p nephrectormy, nephrology following     4.    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   8/5/24: Prednisone 15/10  8/28: Pred increased back 15 mg daily, dex s/s QID     #Skin GVHD- history of biopsy proven GVHD of the skin 8/30/2021; resolved.   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 WNL today   # Ocular GVHD: follows with ophthalmology. Maxitrol to lids, continue  refreshing drops QID. Score 3, but down to 2 times eye drops from >10/day, using scleral lenses  Followed closely by Dr. Juarez with significant improvement with scleral lenses and eyedrops  # Oral GVHD: pred increase as above, dex s/s QID. Lichenoid changes prominent L-buccal mucosa. Good pain control during appointment on 9/9/24.     Previous therapies  Tacrolimus-previously decided to stay on same dose, no checks of levels if clinically stable, and no need switch to sirolimus. (keep tac low as gvhd is quiet and viremia). 5/10 level 45 with starting of COVID therapy and D-D intractions (Paxlovid for COVID19, which has a drug interaction with tacrolimus-Ritonavir increases serum levels of tacrolimus).   Tacrolimus level subtherapeutic, increase to 2.5 mg twice daily recently, 8/23/2022 instructed to change tacrolimus to 2 mg twice daily. 9/6 with mild NEGRA, hyperkalemia, hypomagnesemia, and reports some tremors and cramps, suspect tacrolimus to be high therefore empirically decreased to 1.5 twice daily, skip morning dose of tacrolimus and took 2 mg today after his afternoon labs. Decrease to 1mg BID on Saturday.  Sirolimus  9/21 despite fluids and a dose adjustment of tacrolimus patient seem to have persistence NORBERTO related NEGRA, therefore after discussion with the patient decision was made to transition to sirolimus that was started on 9/21, patient initially seem to tolerate this well with normalization of kidney function  10/11 presented with flares of ocular and oral GVHD, fluid retention and gain of 5-6 kg, lower extremity edema, abdominal distention, total protein to creatinine ratio elevated at 0.4, in addition to elevation in BUN and creatinine, HTN.  Picture most consistent with sirolimus related side effects.  Less likely that the patient will tolerate even a lower level of sirolimus.  Therefore the patient was instructed to stop sirolimus, last dose on 10/10. 10/14 level 3.5 appropriately trending  down.     Corticosteroids  3/1-3/16: prednisone 100mg every day  3/17 75mg every day   3/31 75 alt with 50  4/6: 75 alt with 25mg every other day  4/12 pred tapered to 60 alternating with 25mg   4/15 In setting of viruses trying to reactivate,GVHD stable: Taper 60/15mg alternating.  4/20 decrease pred further to 50/10.    4/25 taper pred to 50/0 every other day as long as symptoms stable.   5/2 Pred decrease 40/0 alternating every other day.   5/13 decrease to 30/0  5/20 decrease to 20/0 then slowly by 5 mg weekly  6/7 decrease to 10/0  Went up to 15/0 with mild increased LFTs  8/23/2022 increased to 20/0, with persistence of oral ulcers and active ocular GVHD.  Discussed with the patient the importance of stopping chewing of nicotine gums for prolonged hours as that would not help with the healing of the oral ulcers.  I discussed with the patient alternatives of nicotine patches that he will reconsider and let me know.  9/6 decreased to 15 alternating with 0  9/13 decreased to 10 alternating with 0  9/21 discontinued tacrolimus given persistent NEGRA and single kidney and start the patient on sirolimus without loading dose.  We will get a list of possible side effects and adverse events from the Pharm.D. see sirolimus section above.  10/11 GVHD flare. Sirolimus stopped. Resume prednisone 40 mg daily as of 10/12, no change with mildly worsening eye pain 10/14  Reduce pred to 35mg 10/25 and 25mg 11/1 11/8 20 mg   11/22 15 mg  12/13/22 10 mg (plan to taper to 5mg then slow taper 1 mg per month given long pred history)  2/23/23 lower from 5 mg to 4 mg for the next month  3/28/2023 - 5/2/2023- 8/15/2023 continue on 10/4 given adrenal insufficieny with recent am cortisol check and clinical symptoms on taper to lower dose of 4 mg daily  -12/2023 had oral GVHD flare started on Prednisone 40 mg,  start taper to 40 and 30 mg alternating for 1 week then 30 mg daily for 1 week, then 30 and 20 alternating for 1 week, then 20 for  1 week till he sees me.  - 2/27/2024 decrease from 10/20mg prednisone to 10/15 then 10/10 -flared and now on 15mg with   - 4/2/2024  15mg  - 5/9/24 15/10  - 8/5/24 10 daily  - 9/9/24 15 mg daily ongoing    Belumosudil  Patient intolerant/with disease flares on tacrolimus and sirolimus see above.  Discussed with the patient the different options for therapy of chronic GVHD that are FDA approved.  Given the side effect profiles after discussing in detail and given the least nephrotoxicity and expected response, taking into account other metabolic effect as well.  We will proceed with prior authorization with belumosudil.  Patient was given material to review for possible expected adverse events and side effects with both Belumosodil and ruxolitinib.   --- Started on 10/18/2022 - skin/liver/oral GVHD inactive, and occular symptoms controlled/symptomatic improvement.   --- 10/10/2023 will hold belumosodil given recurrent infections ans significant improvement in symptoms with no end organ damage after discussions with him and his wife. Will optimize topical treatment with scleral lenses, eyedrops ans dex s/sp int he interim to avoid flares. Will readdress need to restart systemic treatment pending infectious resolution and symptoms.      5.  ID: recent fever in immunocompromised host; mild leukocytosis; blood cx, RVP, covid pending    # history of Epidural abscess: Followed by Dr. Candelario ID, plan to be on IV ceftriaxone for at least 6 weeks course through 5/11/2023-to be determined on further follow-up.  See imaging with MRI follow-up as above.     #Recurrent discitis/OM still on treatment through October 2023, cx negative, managed with ID locally.  3/30/2023 blood culture with Staph epidermidis  3/31/2023 BONE, L5, FLUOROSCOPICALLY-GUIDED NEEDLE BIOPSY: Benign bone with trilineage hematopoiesis (normal) Negative for neoplasm Negative for acute osteomyelitis. DISC, L5-S1, ASPIRATION FOR CYTOLOGY: Acellular debris; no  cellular material present for evaluation     #History of COVID x2 last 1/2023 and influenza    Treated with Paxlovid with persistent fatigue but no active respiratory symptoms    #History of pulmonary infiltrates:   4/20 CT chest with new RUL infiltrate. Highly immunocompromised. 4/22 Fungitell/asp GM were neg. BAL 4/29 NGTD, increased micafungin to 150mg IV daily-- f/u 6/1 Dr Garcia CT with mixed results, followed with Dr. Garcia August 2022 with resolution of pulmonary nodules and normalization of LFTs switched patient to posaconazole prophylactic dose and monitor LFTs and any infectious concerns closely. Monitor and low threshold check CT or add back if on higher dose steroids.     #History of CMV viremia:    - CMV viremia up to 1100. 4/15 started valcyte 900mg PO BID. 4/20 CMV <137, 5/10 ND, decreased to 450mg BID 4/30 - continue 4 weeks as long as CMV <137 till 5/28 + weekly CMV. Switched to acyclovir after completion of therapy 5/28/2022. recheck 3/1/23 ND    # History of Oral herpes  12/2023:HSV oral lesions; PCR positive. Stopped ACV last month and lesions appears soon thereafter. Empirically Rx Valtrex. mild lingering URI sx; RVP + rhino (12/21); supportive cares. (CCT 12/1/23 neg).started on pred 40mg/d.  Completed Valtrex course and put back on acyclovir restarted 1/20/2024    # History of PNA/bronchitis 2/9/2024 sp doxycyline    # History of RSV PNA 3/26/2024, s/p ribavirin    #Hypogammaglobulinemia with recurrent infections: maintain IgG > 400       - PPx:   ---ACV  Patient developed oral herpes reactivation after stopping acyclovir therefore resumed on 1/9/2023  ---Posaconazole (previously on micafungin with LFts abl, hx of pulmonary nodules needign treatment dose). 11/14/2023 discontinue and check CT in 2 months given history completed December 2023 with no concerns for infections or nodular lesions. Monitor and low threshold check CT or add back if on higher dose steroids.  ---Pentamidine, future  4/22/2024  ---On Penicillin 250 bid px     - EBV viremia: 4/20 CAP CT (w/ contrast): No adenopathy. S/p 4/22 and 5/4/22 rituximab 375mg/m2 5/10 ND x2, 3/13/2024     - vaccinations: Previously deferred annual vaccines in the setting of GVHD and immunosuppression therapy. 11/14/2023 started vaccination series, including Shingrix, COVID-vaccine. 1/9/2023 Shingrix vaccine.      received his influenza/covid vaccine 8/24/2024 and RSV vaccine 8/13.     6. Endo:   - Hx of graves disease; On synthroid follows with endocrine  - HLD: 11/2022 cholesterol 355, triglycerides 153, -- 11/8 started rosuvastatin 5 mg daily, 11/22 CPK within normal, 5/2/2023 repeat lipid profile cholesterol down to 202, triglycerides 191, LDL now normal at 95.  We will continue same dose of rosuvastatin and add fish oil 1g BID (on hold). Repeated August with PCP, who added back fish oil    7. CV:   - Hypertension history   - TTE most recently 10/2022, ECHO in January 2024 at [facility]     8. GI:   - Omeprazole for heartburn/ GI protection (steroids)  - LFTs as above, now normal. Off ursodiol     9. Psych:   - Situational anxiety - lexapro 10mg daily.   - Insomnia: worse on steroids. Ativan, trazadone, melatonin     10. MSK:   -History of left food drop, PT. Occasional muscle cramps discussed optimizing vitamin D levels and considering vitamin D, some of the symptomatology of muscle cramps are likely related to chronic GVHD.  No muscle cramps reported today.  -4/2023 new tearing of the right acetabular labrum anterosuperiorly referred to to orthopedic surgery.       11. HCM/Age appropriate cancer screening:  -Discussed with the patient importance of age-appropriate cancer screening including colonoscopies, he is due for this.  -Discussed importance of at least once a year vitamin D level check, 8/18/2022 wnl  -Screening for secondary iron overload, see heme section above  -Yearly TSH 2022 wnl; yearly lipid panel - see GI section  above  -9/6/2022 DEXA scan Based on BMD diagnosis is consistent with normal bone density based on WHO criteria Ref. 1   -PFTs 9/20/22, see above. 9/21/2023 PFTs The FVC, FEV1, FEV1/FVC ratio and LLO46-80% are within normal limits. FVC 99% (108), FEV1 91%, DLCO uncorrected 91%.  Repeat PFTs in 4 to 6 months.  -Metabolic other, 8/2022 testosterone within normal  -11/22/2022 discussed with the patient the challenges and the stresses of chronic illness and healthcare fatigue.  Patient had previously been on Lexapro that we restarted confirmed with pharmacy that there are no drug drug interactions.  Additionally we will referred patient to PMNR to help with physical rehab and strength to help with fatigue.  Our social workers will also reach out to Nik and his wife for further resources including support groups for patients with chronic illness and fatigue post transplant.  -Referral to palliative care for symptom and pain management including insomnia     Summary: He is having low-grade fevers without any localizing symptoms, rash, or respiratory symptoms.  He is significantly immunocompromised and on chronic prednisone.  Given the lack of specific symptoms, I did recommend a more aggressive evaluation with a CT scan of the chest.  In addition given his time spent up in the woods, I recommended empiric therapy for tickborne illness and today will prescribe doxycycline.  Will send fungal serologies.    Plan:  - doxycycline for tick borne illness  - B1,3G, galactomannin, legionella, crypto  - CT chest  - Consider adding ruxo for pred sparing effect  - RTC 4 weeks    The longitudinal plan of care for the diagnosis(es)/condition(s) as documented were addressed during this visit. Due to the added complexity in care, I will continue to support Nik in the subsequent management and with ongoing continuity of care.      30 minutes spent on the date of the encounter doing chart review, history and exam, lab review,  documentation and coordniating care.    DOMINIQUE BRITTON MD  October 19, 2024

## 2024-10-18 NOTE — NURSING NOTE
"Oncology Rooming Note    October 18, 2024 10:59 AM   Nik Nava is a 64 year old male who presents for:    Chief Complaint   Patient presents with    Port Draw     Labs drawn from port by RN in lab    Oncology Clinic Visit     GVHD     Initial Vitals: /78 (BP Location: Right arm, Patient Position: Sitting, Cuff Size: Adult Regular)   Pulse 87   Temp 98.8  F (37.1  C) (Oral)   Resp 16   Wt 120.3 kg (265 lb 3.2 oz)   SpO2 97%   BMI 34.99 kg/m   Estimated body mass index is 34.99 kg/m  as calculated from the following:    Height as of 9/23/24: 1.854 m (6' 1\").    Weight as of this encounter: 120.3 kg (265 lb 3.2 oz). Body surface area is 2.49 meters squared.  Mild Pain (2) Comment: Data Unavailable   No LMP for male patient.  Allergies reviewed: Yes  Medications reviewed: Yes    Medications: Medication refills not needed today.  Pharmacy name entered into [employer]:    [facility] - [place], [place] - [address] [facility] [place], [place] - [address] [place] [address] [phone]  [facility]  [facility] - [place], [place] - [address] [place] [address]    Frailty Screening:   Is the patient here for a new oncology consult visit in cancer care? 2. No      Clinical concerns: Pt states feeling the same, some mild fevers, increased HR. Please look at left leg.    Dr. Cote was notified.      Pat Altman"

## 2024-10-18 NOTE — NURSING NOTE
Chief Complaint   Patient presents with    Port Draw     Labs drawn from port by RN in lab     Port accessed with 20 gauge, 3/4 inch, flat needle by RN, labs collected, line flushed with saline and heparin. Port de-accessed. Vitals taken. Pt checked in for appointment(s).     Vika Lemus, ENRIQUE

## 2024-10-19 LAB
1,3 BETA GLUCAN SER-MCNC: 39 PG/ML
OBSERVATION IMP: NEGATIVE

## 2024-10-20 LAB
BACTERIA BLD CULT: NO GROWTH
BACTERIA BLD CULT: NO GROWTH
GALACTOMANNAN AG SERPL QL IA: NEGATIVE
GALACTOMANNAN AG SPEC IA-ACNC: 0.04

## 2024-10-21 ENCOUNTER — DOCUMENTATION ONLY (OUTPATIENT)
Dept: PHARMACY | Facility: CLINIC | Age: 64
End: 2024-10-21
Payer: MEDICARE

## 2024-10-21 ENCOUNTER — ANCILLARY PROCEDURE (OUTPATIENT)
Dept: CT IMAGING | Facility: CLINIC | Age: 64
End: 2024-10-21
Attending: INTERNAL MEDICINE
Payer: MEDICARE

## 2024-10-21 DIAGNOSIS — D89.813 GVHD AS COMPLICATION OF BONE MARROW TRANSPLANT (H): ICD-10-CM

## 2024-10-21 DIAGNOSIS — T86.09 GVHD AS COMPLICATION OF BONE MARROW TRANSPLANT (H): ICD-10-CM

## 2024-10-21 LAB — IGG SERPL-MCNC: 574 MG/DL (ref 610–1616)

## 2024-10-21 PROCEDURE — G1010 CDSM STANSON: HCPCS | Mod: GC | Performed by: RADIOLOGY

## 2024-10-21 PROCEDURE — 71250 CT THORAX DX C-: CPT | Mod: MG | Performed by: RADIOLOGY

## 2024-10-21 NOTE — PROGRESS NOTES
Pharmacist IVIG Stewardship Program    Diagnosis: Hypogammaglobulinemia   Date  4/12/24   IgG Level (mg/dL) 352     Current Dosing Regimen: Privigen 40g IV every 28 days PRN for IgG <400 mg/dL  Patient is dosed as needed based on an IgG levels of less than 400 mg/dL to ensure the lowest amount of medication is administered to achieve beneficial outcomes.  Pre-medications:   Acetaminophen 650 mg by mouth, 30 minutes prior to infusion  Diphenhydramine 50 mg by mouth, 30 minutes prior to infusion  Hydrocortisone 100 mg IV, 30 minutes prior to infusion  Current Titration Regimen: Start infusion at 0.5 ml/kg/hr x 15 min. If tolerated, increase rate by 0.5 ml/kg/hr every 15 min to a maximum of 4 ml/kg/hr.  Previously Tried Products: None     Side Effects: Unable to reach patient to discuss.     Interventions: Lab review completed. .     Assessment:   Date  10/18/2024   IgG Level (mg/dL) 574   Patient is appropriate for additional IVIG therapy when their level is within parameter. IVIG infusions are effective in increasing IgG levels to an appropriate level to minimize patient's risk of infection. Patient should continue on treatment as they have been per provider order. Without therapy their levels would put them at an increased risk of infections.    Plan: Patient is not okay to proceed with infusions at this time as their level is above their provider ordered parameter. They will need to have labs drawn again next month to assess the need of ongoing infusions moving forward.     Next Review Needed: 11/2024    Genevieve Ramirez, PharmD, IgCP  Medication Access Pharmacist

## 2024-10-23 LAB — B BURGDOR IGG+IGM SER QL: 0.04

## 2024-10-24 ENCOUNTER — TELEPHONE (OUTPATIENT)
Dept: PULMONOLOGY | Facility: CLINIC | Age: 64
End: 2024-10-24
Payer: MEDICARE

## 2024-10-24 ENCOUNTER — CARE COORDINATION (OUTPATIENT)
Dept: TRANSPLANT | Facility: CLINIC | Age: 64
End: 2024-10-24
Payer: MEDICARE

## 2024-10-24 NOTE — PROGRESS NOTES
BMT Nurse Triage - Generic/Other Symptoms     Treating Provider:   Dr. Cote    Date of last office visit: 10/18    Onset of symptoms: 10/14     Duration of symptoms: 1 1/2 week    Brief description of symptoms:     This morning Nik woke up with a temp of 101.1, pulse 128.      Every morning he is waking up with temp 99.5-100.0 since 10/14. Wife give pt tylenol in the morning and his temp goes away for the rest of the day. He continues to have a stuffy nose, achy, fatigue, and headache.       Recommendations from BMT GIR[facility]: continue to push fluids, tylenol for fever, rest. If it continues or gets worse, pt is to call back.     This information was provided to Nik's wife and she will call if Nik has no improvement or gets worse.

## 2024-10-24 NOTE — TELEPHONE ENCOUNTER
"Per Dr Garcia:  \"They contacted me in [facility] yesterday regarding his need for follow-up.  Please schedule him as a Follow-up visit next Tuesday at 2:30.  He will also likely  need a bronchoscopy next week, I will discuss with him when I see him back in clinic.\"    Spoke to pt to schedule sooner appt, pt has been rescheduled to 10/29 at 2:30.  "

## 2024-10-24 NOTE — PROGRESS NOTES
Nik is feeling okay this afternoon temp back to normal, but BP is 89/65. I spoke with Dr. Cote and decided to add him on to his schedule tomorrow 10/25 for further workup. Nik and Lamar will call the after hours triage number if he feels any worse over night or if his BP continues to trend down.

## 2024-10-24 NOTE — TELEPHONE ENCOUNTER
Patient Contacted for the patient to call back and schedule the following:    Appointment type: Return Pulm BMT  Provider: Radha  Return date: Next Avail  Specialty phone number: [phone]  Additional appointment(s) needed: na  Additonal Notes: pt last saw Dr. Garcia 2022

## 2024-10-25 ENCOUNTER — LAB (OUTPATIENT)
Dept: LAB | Facility: CLINIC | Age: 64
End: 2024-10-25
Attending: INTERNAL MEDICINE
Payer: MEDICARE

## 2024-10-25 ENCOUNTER — ONCOLOGY VISIT (OUTPATIENT)
Dept: TRANSPLANT | Facility: CLINIC | Age: 64
End: 2024-10-25
Attending: INTERNAL MEDICINE
Payer: MEDICARE

## 2024-10-25 VITALS
HEART RATE: 90 BPM | TEMPERATURE: 98.5 F | BODY MASS INDEX: 34.8 KG/M2 | OXYGEN SATURATION: 96 % | RESPIRATION RATE: 18 BRPM | WEIGHT: 263.8 LBS | DIASTOLIC BLOOD PRESSURE: 73 MMHG | SYSTOLIC BLOOD PRESSURE: 110 MMHG

## 2024-10-25 DIAGNOSIS — T86.09 GVHD AS COMPLICATION OF BONE MARROW TRANSPLANT (H): Primary | ICD-10-CM

## 2024-10-25 DIAGNOSIS — N18.2 CKD (CHRONIC KIDNEY DISEASE) STAGE 2, GFR 60-89 ML/MIN: ICD-10-CM

## 2024-10-25 DIAGNOSIS — D89.813 GVHD AS COMPLICATION OF BONE MARROW TRANSPLANT (H): Primary | ICD-10-CM

## 2024-10-25 DIAGNOSIS — E55.9 VITAMIN D DEFICIENCY: ICD-10-CM

## 2024-10-25 LAB
ALBUMIN MFR UR ELPH: 24.3 MG/DL
ALBUMIN SERPL BCG-MCNC: 3.9 G/DL (ref 3.5–5.2)
ALBUMIN UR-MCNC: 20 MG/DL
ALP SERPL-CCNC: 86 U/L (ref 40–150)
ALT SERPL W P-5'-P-CCNC: 18 U/L (ref 0–70)
ANION GAP SERPL CALCULATED.3IONS-SCNC: 11 MMOL/L (ref 7–15)
APPEARANCE UR: CLEAR
AST SERPL W P-5'-P-CCNC: 27 U/L (ref 0–45)
BASOPHILS # BLD AUTO: 0 10E3/UL (ref 0–0.2)
BASOPHILS NFR BLD AUTO: 0 %
BILIRUB SERPL-MCNC: 0.5 MG/DL
BILIRUB UR QL STRIP: NEGATIVE
BUN SERPL-MCNC: 33.8 MG/DL (ref 8–23)
C PNEUM DNA SPEC QL NAA+PROBE: NOT DETECTED
CALCIUM SERPL-MCNC: 10.1 MG/DL (ref 8.8–10.4)
CHLORIDE SERPL-SCNC: 102 MMOL/L (ref 98–107)
COLOR UR AUTO: YELLOW
CREAT SERPL-MCNC: 1.24 MG/DL (ref 0.67–1.17)
CREAT UR-MCNC: 159 MG/DL
EGFRCR SERPLBLD CKD-EPI 2021: 64 ML/MIN/1.73M2
EOSINOPHIL # BLD AUTO: 0.1 10E3/UL (ref 0–0.7)
EOSINOPHIL NFR BLD AUTO: 0 %
ERYTHROCYTE [DISTWIDTH] IN BLOOD BY AUTOMATED COUNT: 14.8 % (ref 10–15)
FLUAV H1 2009 PAND RNA SPEC QL NAA+PROBE: NOT DETECTED
FLUAV H1 RNA SPEC QL NAA+PROBE: NOT DETECTED
FLUAV H3 RNA SPEC QL NAA+PROBE: NOT DETECTED
FLUAV RNA SPEC QL NAA+PROBE: NOT DETECTED
FLUBV RNA SPEC QL NAA+PROBE: NOT DETECTED
GLUCOSE SERPL-MCNC: 114 MG/DL (ref 70–99)
GLUCOSE UR STRIP-MCNC: NEGATIVE MG/DL
HADV DNA SPEC QL NAA+PROBE: NOT DETECTED
HCO3 SERPL-SCNC: 23 MMOL/L (ref 22–29)
HCOV PNL SPEC NAA+PROBE: NOT DETECTED
HCT VFR BLD AUTO: 34.5 % (ref 40–53)
HGB BLD-MCNC: 12 G/DL (ref 13.3–17.7)
HGB UR QL STRIP: NEGATIVE
HMPV RNA SPEC QL NAA+PROBE: NOT DETECTED
HPIV1 RNA SPEC QL NAA+PROBE: NOT DETECTED
HPIV2 RNA SPEC QL NAA+PROBE: NOT DETECTED
HPIV3 RNA SPEC QL NAA+PROBE: NOT DETECTED
HPIV4 RNA SPEC QL NAA+PROBE: NOT DETECTED
IMM GRANULOCYTES # BLD: 0.1 10E3/UL
IMM GRANULOCYTES NFR BLD: 1 %
KETONES UR STRIP-MCNC: NEGATIVE MG/DL
LEUKOCYTE ESTERASE UR QL STRIP: NEGATIVE
LYMPHOCYTES # BLD AUTO: 2.2 10E3/UL (ref 0.8–5.3)
LYMPHOCYTES NFR BLD AUTO: 18 %
M PNEUMO DNA SPEC QL NAA+PROBE: NOT DETECTED
MCH RBC QN AUTO: 33.2 PG (ref 26.5–33)
MCHC RBC AUTO-ENTMCNC: 34.8 G/DL (ref 31.5–36.5)
MCV RBC AUTO: 96 FL (ref 78–100)
MONOCYTES # BLD AUTO: 0.8 10E3/UL (ref 0–1.3)
MONOCYTES NFR BLD AUTO: 6 %
MUCOUS THREADS #/AREA URNS LPF: PRESENT /LPF
NEUTROPHILS # BLD AUTO: 9.2 10E3/UL (ref 1.6–8.3)
NEUTROPHILS NFR BLD AUTO: 74 %
NITRATE UR QL: NEGATIVE
NRBC # BLD AUTO: 0 10E3/UL
NRBC BLD AUTO-RTO: 0 /100
PH UR STRIP: 6 [PH] (ref 5–7)
PHOSPHATE SERPL-MCNC: 2.9 MG/DL (ref 2.5–4.5)
PLATELET # BLD AUTO: 240 10E3/UL (ref 150–450)
POTASSIUM SERPL-SCNC: 4.6 MMOL/L (ref 3.4–5.3)
PROT SERPL-MCNC: 6.7 G/DL (ref 6.4–8.3)
PROT/CREAT 24H UR: 0.15 MG/MG CR (ref 0–0.2)
PTH-INTACT SERPL-MCNC: 36 PG/ML (ref 15–65)
RBC # BLD AUTO: 3.61 10E6/UL (ref 4.4–5.9)
RBC URINE: 1 /HPF
RSV RNA SPEC QL NAA+PROBE: NOT DETECTED
RSV RNA SPEC QL NAA+PROBE: NOT DETECTED
RV+EV RNA SPEC QL NAA+PROBE: NOT DETECTED
SODIUM SERPL-SCNC: 136 MMOL/L (ref 135–145)
SP GR UR STRIP: 1.02 (ref 1–1.03)
UROBILINOGEN UR STRIP-MCNC: NORMAL MG/DL
VIT D+METAB SERPL-MCNC: 40 NG/ML (ref 20–50)
WBC # BLD AUTO: 12.4 10E3/UL (ref 4–11)
WBC URINE: <1 /HPF

## 2024-10-25 PROCEDURE — 84100 ASSAY OF PHOSPHORUS: CPT | Performed by: INTERNAL MEDICINE

## 2024-10-25 PROCEDURE — 81001 URINALYSIS AUTO W/SCOPE: CPT | Performed by: INTERNAL MEDICINE

## 2024-10-25 PROCEDURE — 36415 COLL VENOUS BLD VENIPUNCTURE: CPT | Performed by: INTERNAL MEDICINE

## 2024-10-25 PROCEDURE — 250N000011 HC RX IP 250 OP 636: Performed by: INTERNAL MEDICINE

## 2024-10-25 PROCEDURE — 83970 ASSAY OF PARATHORMONE: CPT | Performed by: INTERNAL MEDICINE

## 2024-10-25 PROCEDURE — 84156 ASSAY OF PROTEIN URINE: CPT | Performed by: INTERNAL MEDICINE

## 2024-10-25 PROCEDURE — G0463 HOSPITAL OUTPT CLINIC VISIT: HCPCS | Performed by: INTERNAL MEDICINE

## 2024-10-25 PROCEDURE — 87799 DETECT AGENT NOS DNA QUANT: CPT | Performed by: INTERNAL MEDICINE

## 2024-10-25 PROCEDURE — G2211 COMPLEX E/M VISIT ADD ON: HCPCS | Performed by: INTERNAL MEDICINE

## 2024-10-25 PROCEDURE — 82306 VITAMIN D 25 HYDROXY: CPT | Performed by: INTERNAL MEDICINE

## 2024-10-25 PROCEDURE — 85004 AUTOMATED DIFF WBC COUNT: CPT | Performed by: INTERNAL MEDICINE

## 2024-10-25 PROCEDURE — 85018 HEMOGLOBIN: CPT | Performed by: INTERNAL MEDICINE

## 2024-10-25 PROCEDURE — 99214 OFFICE O/P EST MOD 30 MIN: CPT | Performed by: INTERNAL MEDICINE

## 2024-10-25 PROCEDURE — 82040 ASSAY OF SERUM ALBUMIN: CPT | Performed by: INTERNAL MEDICINE

## 2024-10-25 PROCEDURE — 36591 DRAW BLOOD OFF VENOUS DEVICE: CPT | Performed by: INTERNAL MEDICINE

## 2024-10-25 PROCEDURE — 82784 ASSAY IGA/IGD/IGG/IGM EACH: CPT | Performed by: INTERNAL MEDICINE

## 2024-10-25 PROCEDURE — 87486 CHLMYD PNEUM DNA AMP PROBE: CPT | Performed by: INTERNAL MEDICINE

## 2024-10-25 RX ORDER — HEPARIN SODIUM (PORCINE) LOCK FLUSH IV SOLN 100 UNIT/ML 100 UNIT/ML
5 SOLUTION INTRAVENOUS ONCE
Status: COMPLETED | OUTPATIENT
Start: 2024-10-25 | End: 2024-10-25

## 2024-10-25 RX ADMIN — Medication 5 ML: at 13:08

## 2024-10-25 ASSESSMENT — PAIN SCALES - GENERAL: PAINLEVEL_OUTOF10: NO PAIN (0)

## 2024-10-25 NOTE — LETTER
10/25/2024      Nik Nava  [address] [facility] [id number]      Dear Colleague,    Thank you for referring your patient, Nik Nava, to the [facility]LOOD AND MARROW [facility]ANSPLANT PROGRAM [place]. Please see a copy of my visit note below.      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 Recent oral HSV and oral cGVHD flare treated with prednisone.       HPI: Nik returns today with ongoing fevers and malaise.  To review his recent history, he has had 10 days of a syndrome characterized by early morning chills with occasional shivering, followed by waking in the morning with a fever typically in the 101 range.  His highest temperature was 101.5, and some mornings have been around 100.6.  He has no localizing symptoms other than perhaps a mild dry cough that he is unsure is really worse than a chronic cough.  During the rest of the day, his temperature stays in the  range, and he has no localizing pain, shortness of breath, new skin rash, wounds, change in bowel habits, nausea, or vomiting.  His appetite is down somewhat, although he continues to eat and drink.  He has significant fatigue and has spent a lot of days sitting in a chair and taking short walks but not much more activity than that.  [place] knows of known tick exposure or bites.  He is now 6 days into a 7-day doxycycline prescription without any significant change in symptoms.  A CT scan performed last week showed groundglass opacities bilaterally    Review of Systems                                                                                                                                         10 point Review of systems was negative except as detailed above     PHYSICAL EXAM                                                                                                                                                    KPS: 70  Blood pressure 110/73, pulse 90, temperature 98.5  F (36.9  C), temperature source Oral, resp. rate 18, weight 119.7 kg (263 lb 12.8 oz), SpO2 96%.    Repeat VS taken reported in flow sheet    Wt Readings from Last 4 Encounters:   10/25/24 119.7 kg (263 lb 12.8 oz)   10/18/24 120.3 kg (265 lb 3.2 oz)   09/23/24 118.9 kg (262 lb 1.6 oz)   09/09/24 118.8 kg (261 lb 14.4 oz)      General: NAD.  HEENT: sclera anicteric.  Lichenoid changes and associated erythema of b/l buccal mucosa.  No active ulceration.  Lymph nodes: I detect no lymphadenopathy in his head neck region, upper chest, or axilla  Lungs:  CTA b/l  no wheezes  CV: RRR  no gallop  Abd; soft, NABS, protuberant  Ext/ Skin: Skin bruising on bilateral forearms,  fainting of previous hyperpigmented areas of previously known cGVHD      cGVHD therapy started on February 24 2022  - Baseline NIH scoring 2/24/2022 : skin 2 maculopapular rash/erythema with no sclerotic features, mouth score 1 mild symptoms with lichenoid changes less than 25%, eyes score 2 moderate symptoms without new visual impairments due to KCS requiring lubricant eyedrops more than 3 times a day, overall mild scale for her provider  - 3/25/2022 1 month NIH treatment assessment on PQRST: skin 2, mouth score 1 mild symptoms with NO lichenoid changes, eyes score 2 moderate symptoms w requiring lubricant eyedrops more than 3 times a day, liver score 1 ALT 3.7x ULN and nl bilirubin   - 3/31  Mouth clear; eyes minimal LFT still abn; no joint or new GI sx  - 4/28 Mouth clear, Left>R eye is mild sclera erythema, lids are pink and irritated appearing, LFT's slow improvement, no new joint, skin, or GI symptoms.   -5/11 mouth with mild erythema but no lichenoid changes, eyes using eyedrops 4-6 times a day score of 2, LFTs significantly improved from baseline slight elevation in alk phos and AST with normal bilirubin, skin  with hyperpigmentation from old acute GVHD no active skin rashes, no GI symptoms no musculoskeletal complaints.  -5/26 Mouth with very slight lichenoid changes, erythema.  Eyes still using eyedrops 4-6x per day.  LFTs essentially normal with slight elevation in alk phos.  Skin with hyperpigmentation from old acute GVHD, no active rashes, no GI or MSK concerns.  Skin 0, mouth 0 but with lichenoid changes, eyes 2  -6/7/22 Mouth with no lichenoid changes, erythema.  Eyes still using eyedrops 4-6x per day.  LFTs essentially normal with slight elevation in alk phos.  Skin with hyperpigmentation from old acute GVHD, no active rashes, no GI or MSK concerns.  Skin 0, mouth 0, eyes 2     -6 month PQRST assessment 9/6/2022: eyes 3, mouth 0 for symptoms, 25% lichenoid changes (1), liver/GI/skin 0    11/8/2022, 11/22/2022, 12/13/22 cGVHD NIG scoring eyes 3, no other organ involvement clinically  3/28/23 cGVHD NIG scoring eyes 3, no other organ involvement clinically  5/2/23 cGVHD NIG scoring eyes 3, oral 1, no other organ involvement clinically  6/13/23 cGVHD NIG scoring eyes 3, oral 1, no other organ involvement clinically  8/15/23 cGVHD NIG scoring eyes 3 (down to 3-4 times eye drop from >10 but still using scleral lenses), oral 1, no other organ involvement clinically  10/10/2023 cGVHD NIG scoring eyes 3 (down to 3-4 times eye drop from >10 but still using scleral lenses), oral 1, no other organ involvement clinically  11/14/2023 cGVHD NIG scoring eyes 3 (down to 3-4 times eye drop from >10 but still using scleral lenses), oral 1, no other organ involvement clinically  1/9/2024 cGVHD NIG scoring eyes 3 (down to 3-4 times eye drop from >10 but still using scleral lenses), oral 2, no other organ involvement clinically  2/27/2024 cGVHD NIG scoring eyes 3 (down to 3-4 times eye drop from >10 but still using scleral lenses), oral 1, no other organ involvement clinically  4/22024 cGVHD NIG scoring eyes 3 (down to 2 times eye drop  from >10 but still using scleral lenses), oral 1, no other organ involvement clinically  8/5/2024 cGVHD NIH scoring eyes 2 (down to 2 times eye drop from >10 but still using scleral lenses), oral 0, no other organ involvement clinically    Chronic GVHD          10/27/2024    09:21   Chronic GVHD   Has chronic ndper-iwothq-ghkz disease developed since the last entry? No   Is there persistence of chronic ghnbi-gexazp-bvlv disease since the last entry? Yes   Chronic Rhijv-Hxoomq-Hhas Disease Global Severity Score Moderate   Total Chronic Nmmdc-Mkvdix-Eapm Disease Score 3   Subjective Clinician Opinion of Severity Mild         Blood Counts       Recent Labs   Lab Test 10/25/24  1303 10/18/24  1042 10/15/24  1202   HGB 12.0* 12.3* 13.0*   HCT 34.5* 35.5* 38.6*   WBC 12.4* 13.9* 11.5*   ANEUTAUTO 9.2* 9.6* 8.1   ALYMPAUTO 2.2 2.7 2.0   AMONOAUTO 0.8 1.2 1.1   AEOSAUTO 0.1 0.2 0.2   ABSBASO 0.0 0.1 0.1   NRBCMAN 0.0 0.0 0.0    214 205           Chemistries     Basic Panel  Recent Labs   Lab Test 10/25/24  1303 10/18/24  1042 10/15/24  1202    133* 138   POTASSIUM 4.6 4.5 4.4   CHLORIDE 102 99 102   CO2 23 24 25   BUN 33.8* 36.6* 30.3*   CR 1.24* 1.27* 1.34*   * 110* 123*        Calcium, Magnesium, Phosphorus  Recent Labs   Lab Test 10/25/24  1303 10/18/24  1042 10/15/24  1202 08/05/24  0938 07/09/24  1250 02/27/24  1323 02/20/24  1507 11/27/23  0753 11/14/23  1234 11/14/23  1233 10/10/23  1143 08/15/23  1316   DAMON 10.1 10.0 9.7   < > 9.7   < > 10.1   < > 10.1   < > 10.2 10.0   MAG  --   --   --   --   --   --   --   --  2.0  --  1.9 2.0   PHOS 2.9  --   --   --  3.4  --  3.4   < > 3.6  --   --   --     < > = values in this interval not displayed.        LFTs  Recent Labs   Lab Test 10/25/24  1303 10/18/24  1042 10/15/24  1202   BILITOTAL 0.5 0.6 0.7   ALKPHOS 86 93 93   AST 27 25 26   ALT 18 20 18   ALBUMIN 3.9 4.0 3.9       LDH  Recent Labs   Lab Test 04/06/22  0947   *           ASSESSMENT AND  ROZINA Nava is a 64 year old male with Ph Pos ALL, day 1174 s/p sib allo stem cell transplant    Day -6 (8/4): flu/cy  Day -5 through day -2 (8/5-8/8): flu  Day -1 (8/9): TBI  Day 0 (8/10): transplant     1.  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 S/p allo sib PBSCT. (ABO matched)  HCT-CI score: 3 (prior solid tumor)  Day +100 bone marrow biopsy is 100% donor with no morphologic or flow cytometric evidence of leukemia BCR abl is pending.  - Day +180 restaging BM bx- still in CR  100% donor;  2/16/22 BCR ABL major breakpoint undetectable.   - 1 year restaging 8/18/2022: Markedly hypocellular bone marrow [less than 10% cellular] with maturing trilineage hematopoiesis and no definite morphologic or immunophenotypic evidence for involvement by B lymphoblastic leukemia. BCRABL1 PCR not detected, bone marrow % donor. FISH NORMAL No evidence of BCR-ABL1 fusion  - Maintenance with TKI posttransplantation given his Ph positive ALL that have not been started previously presumed secondary to multiple active issues specifically infections and COVID.  Per Avila Tobar: will reconsider this patient will think about whether this is something he would like to consider he was previously on Dasatinib, we would start on a low dose and increase as tolerated.  This is on hold now pending active GVHD therapy, we discussed on this visit 10/18 in agreement with holding off.  - 2 year restaging 8/7/2023 BMB: - No morphologic or immunophenotypic evidence of recurrent/persistent B-lymphoblastic leukemia. Slightly hypocellular marrow (10-20% estimated cellularity, patchy and variable), with trilineage hematopoetiic maturation and less than 2% blasts. Peripheral blood showing slight normocytic normochromic anemia and slight thrombocytopenia. BM % donor. FISH No evidence of BCR::ABL1 fusion /CG No recipient (male) cells  or cells with a t(9;22) or other clonal chromosomal abnormality  were  detected among the 20 metaphases analyzed.      2.  HEME:   - mild leukocytosis noted-blood cx pending    Historical:  3/2023 iron low 28, iron saturation index 10%, transferrin 225, ferritin 1381 down trending (in retrospect that was the time of epidural abscess as well).  B12, folate normal.  High copper and zinc borderline low, 5/2023 started zinc.  Started iron replacement in 4/2023, recheck iron profile on follow up more consistent with AOCD, Iron 11/14/23 WNL. Note ferritin elevation is in the setting of discitis/OM, 4/2 down to 1500 but recovering from RSV with nl iron profile, low threshold to obtian ferriscan to objectively assess for iron overload.     3.  FEN/Renal: prerenal azotemia--push oral fluids; s/p nephrectormy, nephrology following     4.    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   8/5/24: Prednisone 15/10  8/28: Pred increased back 15 mg daily, dex s/s QID     #Skin GVHD- history of biopsy proven GVHD of the skin 8/30/2021; resolved.   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 WNL today   # Ocular GVHD: follows with ophthalmology. Maxitrol to lids, continue refreshing drops QID. Score 3, but down to 2 times eye drops from >10/day, using scleral lenses  Followed closely by Dr. Juarez with significant improvement with scleral lenses and eyedrops  # Oral GVHD: pred increase as above, dex s/s QID. Lichenoid changes prominent L-buccal mucosa. Good pain control during appointment on 9/9/24.     Previous therapies  Tacrolimus-previously decided to stay on same dose, no checks of levels if clinically stable, and no need switch to sirolimus. (keep tac low as gvhd is quiet and viremia). 5/10 level 45 with starting of COVID therapy and D-D intractions (Paxlovid for COVID19, which has a drug interaction with tacrolimus-Ritonavir increases serum levels of tacrolimus).   Tacrolimus level subtherapeutic, increase to 2.5 mg  twice daily recently, 8/23/2022 instructed to change tacrolimus to 2 mg twice daily. 9/6 with mild NEGRA, hyperkalemia, hypomagnesemia, and reports some tremors and cramps, suspect tacrolimus to be high therefore empirically decreased to 1.5 twice daily, skip morning dose of tacrolimus and took 2 mg today after his afternoon labs. Decrease to 1mg BID on Saturday.  Sirolimus  9/21 despite fluids and a dose adjustment of tacrolimus patient seem to have persistence NORBERTO related NEGRA, therefore after discussion with the patient decision was made to transition to sirolimus that was started on 9/21, patient initially seem to tolerate this well with normalization of kidney function  10/11 presented with flares of ocular and oral GVHD, fluid retention and gain of 5-6 kg, lower extremity edema, abdominal distention, total protein to creatinine ratio elevated at 0.4, in addition to elevation in BUN and creatinine, HTN.  Picture most consistent with sirolimus related side effects.  Less likely that the patient will tolerate even a lower level of sirolimus.  Therefore the patient was instructed to stop sirolimus, last dose on 10/10. 10/14 level 3.5 appropriately trending down.     Corticosteroids  3/1-3/16: prednisone 100mg every day  3/17 75mg every day   3/31 75 alt with 50  4/6: 75 alt with 25mg every other day  4/12 pred tapered to 60 alternating with 25mg   4/15 In setting of viruses trying to reactivate,GVHD stable: Taper 60/15mg alternating.  4/20 decrease pred further to 50/10.    4/25 taper pred to 50/0 every other day as long as symptoms stable.   5/2 Pred decrease 40/0 alternating every other day.   5/13 decrease to 30/0  5/20 decrease to 20/0 then slowly by 5 mg weekly  6/7 decrease to 10/0  Went up to 15/0 with mild increased LFTs  8/23/2022 increased to 20/0, with persistence of oral ulcers and active ocular GVHD.  Discussed with the patient the importance of stopping chewing of nicotine gums for prolonged hours as  that would not help with the healing of the oral ulcers.  I discussed with the patient alternatives of nicotine patches that he will reconsider and let me know.  9/6 decreased to 15 alternating with 0  9/13 decreased to 10 alternating with 0  9/21 discontinued tacrolimus given persistent NEGRA and single kidney and start the patient on sirolimus without loading dose.  We will get a list of possible side effects and adverse events from the Pharm.D. see sirolimus section above.  10/11 GVHD flare. Sirolimus stopped. Resume prednisone 40 mg daily as of 10/12, no change with mildly worsening eye pain 10/14  Reduce pred to 35mg 10/25 and 25mg 11/1 11/8 20 mg   11/22 15 mg  12/13/22 10 mg (plan to taper to 5mg then slow taper 1 mg per month given long pred history)  2/23/23 lower from 5 mg to 4 mg for the next month  3/28/2023 - 5/2/2023- 8/15/2023 continue on 10/4 given adrenal insufficieny with recent am cortisol check and clinical symptoms on taper to lower dose of 4 mg daily  -12/2023 had oral GVHD flare started on Prednisone 40 mg,  start taper to 40 and 30 mg alternating for 1 week then 30 mg daily for 1 week, then 30 and 20 alternating for 1 week, then 20 for 1 week till he sees me.  - 2/27/2024 decrease from 10/20mg prednisone to 10/15 then 10/10 -flared and now on 15mg with   - 4/2/2024  15mg  - 5/9/24 15/10  - 8/5/24 10 daily  - 9/9/24 15 mg daily ongoing    Belumosudil  Patient intolerant/with disease flares on tacrolimus and sirolimus see above.  Discussed with the patient the different options for therapy of chronic GVHD that are FDA approved.  Given the side effect profiles after discussing in detail and given the least nephrotoxicity and expected response, taking into account other metabolic effect as well.  We will proceed with prior authorization with belumosudil.  Patient was given material to review for possible expected adverse events and side effects with both Belumosodil and ruxolitinib.   --- Started  on 10/18/2022 - skin/liver/oral GVHD inactive, and occular symptoms controlled/symptomatic improvement.   --- 10/10/2023 will hold belumosodil given recurrent infections ans significant improvement in symptoms with no end organ damage after discussions with him and his wife. Will optimize topical treatment with scleral lenses, eyedrops ans dex s/sp int he interim to avoid flares. Will readdress need to restart systemic treatment pending infectious resolution and symptoms.      5.  ID: recent fever in immunocompromised host; mild leukocytosis; blood cx, RVP, covid pending    # history of Epidural abscess: Followed by Dr. Candelario ID, plan to be on IV ceftriaxone for at least 6 weeks course through 5/11/2023-to be determined on further follow-up.  See imaging with MRI follow-up as above.     #Recurrent discitis/OM still on treatment through October 2023, cx negative, managed with ID locally.  3/30/2023 blood culture with Staph epidermidis  3/31/2023 BONE, L5, FLUOROSCOPICALLY-GUIDED NEEDLE BIOPSY: Benign bone with trilineage hematopoiesis (normal) Negative for neoplasm Negative for acute osteomyelitis. DISC, L5-S1, ASPIRATION FOR CYTOLOGY: Acellular debris; no cellular material present for evaluation     #History of COVID x2 last 1/2023 and influenza    Treated with Paxlovid with persistent fatigue but no active respiratory symptoms    #History of pulmonary infiltrates:   4/20 CT chest with new RUL infiltrate. Highly immunocompromised. 4/22 Fungitell/asp GM were neg. BAL 4/29 NGTD, increased micafungin to 150mg IV daily-- f/u 6/1 Dr Garcia CT with mixed results, followed with Dr. Garcia August 2022 with resolution of pulmonary nodules and normalization of LFTs switched patient to posaconazole prophylactic dose and monitor LFTs and any infectious concerns closely. Monitor and low threshold check CT or add back if on higher dose steroids.     #History of CMV viremia:    - CMV viremia up to 1100. 4/15 started valcyte 900mg  PO BID. 4/20 CMV <137, 5/10 ND, decreased to 450mg BID 4/30 - continue 4 weeks as long as CMV <137 till 5/28 + weekly CMV. Switched to acyclovir after completion of therapy 5/28/2022. recheck 3/1/23 ND    # History of Oral herpes  12/2023:HSV oral lesions; PCR positive. Stopped ACV last month and lesions appears soon thereafter. Empirically Rx Valtrex. mild lingering URI sx; RVP + rhino (12/21); supportive cares. (CCT 12/1/23 neg).started on pred 40mg/d.  Completed Valtrex course and put back on acyclovir restarted 1/20/2024    # History of PNA/bronchitis 2/9/2024 sp doxycyline    # History of RSV PNA 3/26/2024, s/p ribavirin    #Hypogammaglobulinemia with recurrent infections: maintain IgG > 400       - PPx:   ---ACV  Patient developed oral herpes reactivation after stopping acyclovir therefore resumed on 1/9/2023  ---Posaconazole (previously on micafungin with LFts abl, hx of pulmonary nodules needign treatment dose). 11/14/2023 discontinue and check CT in 2 months given history completed December 2023 with no concerns for infections or nodular lesions. Monitor and low threshold check CT or add back if on higher dose steroids.  ---Pentamidine, future 4/22/2024  ---On Penicillin 250 bid px     - EBV viremia: 4/20 CAP CT (w/ contrast): No adenopathy. S/p 4/22 and 5/4/22 rituximab 375mg/m2 5/10 ND x2, 3/13/2024     - vaccinations: Previously deferred annual vaccines in the setting of GVHD and immunosuppression therapy. 11/14/2023 started vaccination series, including Shingrix, COVID-vaccine. 1/9/2023 Shingrix vaccine.      received his influenza/covid vaccine 8/24/2024 and RSV vaccine 8/13.     6. Endo:   - Hx of graves disease; On synthroid follows with endocrine  - HLD: 11/2022 cholesterol 355, triglycerides 153, -- 11/8 started rosuvastatin 5 mg daily, 11/22 CPK within normal, 5/2/2023 repeat lipid profile cholesterol down to 202, triglycerides 191, LDL now normal at 95.  We will continue same dose  of rosuvastatin and add fish oil 1g BID (on hold). Repeated August with PCP, who added back fish oil    7. CV:   - Hypertension history   - TTE most recently 10/2022, ECHO in January 2024 at [facility]     8. GI:   - Omeprazole for heartburn/ GI protection (steroids)  - LFTs as above, now normal. Off ursodiol     9. Psych:   - Situational anxiety - lexapro 10mg daily.   - Insomnia: worse on steroids. Ativan, trazadone, melatonin     10. MSK:   -History of left food drop, PT. Occasional muscle cramps discussed optimizing vitamin D levels and considering vitamin D, some of the symptomatology of muscle cramps are likely related to chronic GVHD.  No muscle cramps reported today.  -4/2023 new tearing of the right acetabular labrum anterosuperiorly referred to to orthopedic surgery.       11. HCM/Age appropriate cancer screening:  -Discussed with the patient importance of age-appropriate cancer screening including colonoscopies, he is due for this.  -Discussed importance of at least once a year vitamin D level check, 8/18/2022 wnl  -Screening for secondary iron overload, see heme section above  -Yearly TSH 2022 wnl; yearly lipid panel - see GI section above  -9/6/2022 DEXA scan Based on BMD diagnosis is consistent with normal bone density based on WHO criteria Ref. 1   -PFTs 9/20/22, see above. 9/21/2023 PFTs The FVC, FEV1, FEV1/FVC ratio and [id number]% are within normal limits. FVC 99% (108), FEV1 91%, DLCO uncorrected 91%.  Repeat PFTs in 4 to 6 months.  -Metabolic other, 8/2022 testosterone within normal  -11/22/2022 discussed with the patient the challenges and the stresses of chronic illness and healthcare fatigue.  Patient had previously been on Lexapro that we restarted confirmed with pharmacy that there are no drug drug interactions.  Additionally we will referred patient to PMNR to help with physical rehab and strength to help with fatigue.  Our social workers will also reach out to Nik and his wife for further  resources including support groups for patients with chronic illness and fatigue post transplant.  -Referral to palliative care for symptom and pain management including insomnia     Summary: He continues to have daily fevers in the morning accompanied by symptoms of chills and occasional shivering.  The fevers seem to improve through the day although he remains feeling poorly with fatigue but no other clear localizing symptoms.  His ID workup so far has not revealed a source, and he did not respond to doxycycline that I gave to cover the possibility of a tickborne illness.  He does have a CT scan that shows groundglass opacities, but it is unclear that these are new.  Given the ongoing symptoms, we have arranged for him to see Dr. Garcia in pulmonary and we will also request ID consultation.    Plan:  -Extended respiratory viral panel today  -Repeat CMV and EBV  -Pulmonary consultation pending  -ID consultation requested    The longitudinal plan of care for the diagnosis(es)/condition(s) as documented were addressed during this visit. Due to the added complexity in care, I will continue to support Nik in the subsequent management and with ongoing continuity of care.      30 minutes spent on the date of the encounter doing chart review, history and exam, lab review, documentation and coordniating care.    DOMINIQUE BRITTON MD  October 25, 2024            Again, thank you for allowing me to participate in the care of your patient.        Sincerely,        DOMINIQUE BRITTON MD

## 2024-10-25 NOTE — NURSING NOTE
"Oncology Rooming Note    October 25, 2024 1:22 PM   Nik Nava is a 64 year old male who presents for:    Chief Complaint   Patient presents with    Port Draw     Labs drawn via port by RN. VS taken.    Oncology Clinic Visit     RTN for AML      Initial Vitals: /73 (BP Location: Right arm, Patient Position: Sitting, Cuff Size: Adult Large)   Pulse 90   Temp 98.5  F (36.9  C) (Oral)   Resp 18   Wt 119.7 kg (263 lb 12.8 oz)   SpO2 96%   BMI 34.80 kg/m   Estimated body mass index is 34.8 kg/m  as calculated from the following:    Height as of 9/23/24: 1.854 m (6' 1\").    Weight as of this encounter: 119.7 kg (263 lb 12.8 oz). Body surface area is 2.48 meters squared.  No Pain (0) Comment: Data Unavailable   No LMP for male patient.  Allergies reviewed: Yes  Medications reviewed: Yes    Medications: Medication refills not needed today.  Pharmacy name entered into Fios:    [facility] - [place], [place] - [address] [facility] [place], [place] - [id number]  [facility] - [place], [place] - [address] [facility] [address] [address]     Frailty Screening:   Is the patient here for a new oncology consult visit in cancer care? 2. No      Clinical concerns: none       Lynn Weeks MA             "

## 2024-10-25 NOTE — PROGRESS NOTES
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 Recent oral HSV and oral cGVHD flare treated with prednisone.       HPI: Nik returns today with ongoing fevers and malaise.  To review his recent history, he has had 10 days of a syndrome characterized by early morning chills with occasional shivering, followed by waking in the morning with a fever typically in the 101 range.  His highest temperature was 101.5, and some mornings have been around 100.6.  He has no localizing symptoms other than perhaps a mild dry cough that he is unsure is really worse than a chronic cough.  During the rest of the day, his temperature stays in the  range, and he has no localizing pain, shortness of breath, new skin rash, wounds, change in bowel habits, nausea, or vomiting.  His appetite is down somewhat, although he continues to eat and drink.  He has significant fatigue and has spent a lot of days sitting in a chair and taking short walks but not much more activity than that.  [place] knows of known tick exposure or bites.  He is now 6 days into a 7-day doxycycline prescription without any significant change in symptoms.  A CT scan performed last week showed groundglass opacities bilaterally    Review of Systems                                                                                                                                         10 point Review of systems was negative except as detailed above     PHYSICAL EXAM                                                                                                                                                   KPS: 70  Blood pressure 110/73, pulse 90, temperature 98.5  F (36.9  C), temperature source Oral, resp. rate 18, weight 119.7 kg (263 lb 12.8 oz), SpO2 96%.    Repeat VS taken reported in flow  sheet    Wt Readings from Last 4 Encounters:   10/25/24 119.7 kg (263 lb 12.8 oz)   10/18/24 120.3 kg (265 lb 3.2 oz)   09/23/24 118.9 kg (262 lb 1.6 oz)   09/09/24 118.8 kg (261 lb 14.4 oz)      General: NAD.  HEENT: sclera anicteric.  Lichenoid changes and associated erythema of b/l buccal mucosa.  No active ulceration.  Lymph nodes: I detect no lymphadenopathy in his head neck region, upper chest, or axilla  Lungs:  CTA b/l  no wheezes  CV: RRR  no gallop  Abd; soft, NABS, protuberant  Ext/ Skin: Skin bruising on bilateral forearms,  fainting of previous hyperpigmented areas of previously known cGVHD      cGVHD therapy started on February 24 2022  - Baseline NIH scoring 2/24/2022 : skin 2 maculopapular rash/erythema with no sclerotic features, mouth score 1 mild symptoms with lichenoid changes less than 25%, eyes score 2 moderate symptoms without new visual impairments due to KCS requiring lubricant eyedrops more than 3 times a day, overall mild scale for her provider  - 3/25/2022 1 month NIH treatment assessment on PQRST: skin 2, mouth score 1 mild symptoms with NO lichenoid changes, eyes score 2 moderate symptoms w requiring lubricant eyedrops more than 3 times a day, liver score 1 ALT 3.7x ULN and nl bilirubin   - 3/31  Mouth clear; eyes minimal LFT still abn; no joint or new GI sx  - 4/28 Mouth clear, Left>R eye is mild sclera erythema, lids are pink and irritated appearing, LFT's slow improvement, no new joint, skin, or GI symptoms.   -5/11 mouth with mild erythema but no lichenoid changes, eyes using eyedrops 4-6 times a day score of 2, LFTs significantly improved from baseline slight elevation in alk phos and AST with normal bilirubin, skin with hyperpigmentation from old acute GVHD no active skin rashes, no GI symptoms no musculoskeletal complaints.  -5/26 Mouth with very slight lichenoid changes, erythema.  Eyes still using eyedrops 4-6x per day.  LFTs essentially normal with slight elevation in alk  phos.  Skin with hyperpigmentation from old acute GVHD, no active rashes, no GI or MSK concerns.  Skin 0, mouth 0 but with lichenoid changes, eyes 2  -6/7/22 Mouth with no lichenoid changes, erythema.  Eyes still using eyedrops 4-6x per day.  LFTs essentially normal with slight elevation in alk phos.  Skin with hyperpigmentation from old acute GVHD, no active rashes, no GI or MSK concerns.  Skin 0, mouth 0, eyes 2     -6 month PQRST assessment 9/6/2022: eyes 3, mouth 0 for symptoms, 25% lichenoid changes (1), liver/GI/skin 0    11/8/2022, 11/22/2022, 12/13/22 cGVHD NIG scoring eyes 3, no other organ involvement clinically  3/28/23 cGVHD NIG scoring eyes 3, no other organ involvement clinically  5/2/23 cGVHD NIG scoring eyes 3, oral 1, no other organ involvement clinically  6/13/23 cGVHD NIG scoring eyes 3, oral 1, no other organ involvement clinically  8/15/23 cGVHD NIG scoring eyes 3 (down to 3-4 times eye drop from >10 but still using scleral lenses), oral 1, no other organ involvement clinically  10/10/2023 cGVHD NIG scoring eyes 3 (down to 3-4 times eye drop from >10 but still using scleral lenses), oral 1, no other organ involvement clinically  11/14/2023 cGVHD NIG scoring eyes 3 (down to 3-4 times eye drop from >10 but still using scleral lenses), oral 1, no other organ involvement clinically  1/9/2024 cGVHD NIG scoring eyes 3 (down to 3-4 times eye drop from >10 but still using scleral lenses), oral 2, no other organ involvement clinically  2/27/2024 cGVHD NIG scoring eyes 3 (down to 3-4 times eye drop from >10 but still using scleral lenses), oral 1, no other organ involvement clinically  4/22024 cGVHD NIG scoring eyes 3 (down to 2 times eye drop from >10 but still using scleral lenses), oral 1, no other organ involvement clinically  8/5/2024 cGVHD NIH scoring eyes 2 (down to 2 times eye drop from >10 but still using scleral lenses), oral 0, no other organ involvement clinically    Chronic GVHD           10/27/2024    09:21   Chronic GVHD   Has chronic qwwkl-egbvph-fmef disease developed since the last entry? No   Is there persistence of chronic pzzbd-dgxwkf-ftfc disease since the last entry? Yes   Chronic Czifs-Kutfky-Jmor Disease Global Severity Score Moderate   Total Chronic Mnnxg-Euwink-Cmce Disease Score 3   Subjective Clinician Opinion of Severity Mild         Blood Counts       Recent Labs   Lab Test 10/25/24  1303 10/18/24  1042 10/15/24  1202   HGB 12.0* 12.3* 13.0*   HCT 34.5* 35.5* 38.6*   WBC 12.4* 13.9* 11.5*   ANEUTAUTO 9.2* 9.6* 8.1   ALYMPAUTO 2.2 2.7 2.0   AMONOAUTO 0.8 1.2 1.1   AEOSAUTO 0.1 0.2 0.2   ABSBASO 0.0 0.1 0.1   NRBCMAN 0.0 0.0 0.0    214 205           Chemistries     Basic Panel  Recent Labs   Lab Test 10/25/24  1303 10/18/24  1042 10/15/24  1202    133* 138   POTASSIUM 4.6 4.5 4.4   CHLORIDE 102 99 102   CO2 23 24 25   BUN 33.8* 36.6* 30.3*   CR 1.24* 1.27* 1.34*   * 110* 123*        Calcium, Magnesium, Phosphorus  Recent Labs   Lab Test 10/25/24  1303 10/18/24  1042 10/15/24  1202 08/05/24  0938 07/09/24  1250 02/27/24  1323 02/20/24  1507 11/27/23  0753 11/14/23  1234 11/14/23  1233 10/10/23  1143 08/15/23  1316   DAMON 10.1 10.0 9.7   < > 9.7   < > 10.1   < > 10.1   < > 10.2 10.0   MAG  --   --   --   --   --   --   --   --  2.0  --  1.9 2.0   PHOS 2.9  --   --   --  3.4  --  3.4   < > 3.6  --   --   --     < > = values in this interval not displayed.        LFTs  Recent Labs   Lab Test 10/25/24  1303 10/18/24  1042 10/15/24  1202   BILITOTAL 0.5 0.6 0.7   ALKPHOS 86 93 93   AST 27 25 26   ALT 18 20 18   ALBUMIN 3.9 4.0 3.9       LDH  Recent Labs   Lab Test 04/06/22  0947   *           ASSESSMENT AND PLAN   Nik Nava is a 64 year old male with Ph Pos ALL, day 1174 s/p sib allo stem cell transplant    Day -6 (8/4): flu/cy  Day -5 through day -2 (8/5-8/8): flu  Day -1 (8/9): TBI  Day 0 (8/10): transplant     1.  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 S/p allo sib PBSCT. (ABO matched)  HCT-CI score: 3 (prior solid tumor)  Day +100 bone marrow biopsy is 100% donor with no morphologic or flow cytometric evidence of leukemia BCR abl is pending.  - Day +180 restaging BM bx- still in CR  100% donor;  2/16/22 BCR ABL major breakpoint undetectable.   - 1 year restaging 8/18/2022: Markedly hypocellular bone marrow [less than 10% cellular] with maturing trilineage hematopoiesis and no definite morphologic or immunophenotypic evidence for involvement by B lymphoblastic leukemia. BCRABL1 PCR not detected, bone marrow % donor. FISH NORMAL No evidence of BCR-ABL1 fusion  - Maintenance with TKI posttransplantation given his Ph positive ALL that have not been started previously presumed secondary to multiple active issues specifically infections and COVID.  Per Avila Tobar: will reconsider this patient will think about whether this is something he would like to consider he was previously on Dasatinib, we would start on a low dose and increase as tolerated.  This is on hold now pending active GVHD therapy, we discussed on this visit 10/18 in agreement with holding off.  - 2 year restaging 8/7/2023 BMB: - No morphologic or immunophenotypic evidence of recurrent/persistent B-lymphoblastic leukemia. Slightly hypocellular marrow (10-20% estimated cellularity, patchy and variable), with trilineage hematopoetiic maturation and less than 2% blasts. Peripheral blood showing slight normocytic normochromic anemia and slight thrombocytopenia. BM % donor. FISH No evidence of BCR::ABL1 fusion /CG No recipient (male) cells  or cells with a t(9;22) or other clonal chromosomal abnormality were  detected among the 20 metaphases analyzed.      2.  HEME:   - mild leukocytosis noted-blood cx pending    Historical:  3/2023 iron low 28, iron saturation index 10%, transferrin 225, ferritin 1381 down trending (in retrospect that was the time of epidural  abscess as well).  B12, folate normal.  High copper and zinc borderline low, 5/2023 started zinc.  Started iron replacement in 4/2023, recheck iron profile on follow up more consistent with AOCD, Iron 11/14/23 WNL. Note ferritin elevation is in the setting of discitis/OM, 4/2 down to 1500 but recovering from RSV with nl iron profile, low threshold to obtian ferriscan to objectively assess for iron overload.     3.  FEN/Renal: prerenal azotemia--push oral fluids; s/p nephrectormy, nephrology following     4.    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   8/5/24: Prednisone 15/10  8/28: Pred increased back 15 mg daily, dex s/s QID     #Skin GVHD- history of biopsy proven GVHD of the skin 8/30/2021; resolved.   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 WNL today   # Ocular GVHD: follows with ophthalmology. Maxitrol to lids, continue refreshing drops QID. Score 3, but down to 2 times eye drops from >10/day, using scleral lenses  Followed closely by Dr. Juarez with significant improvement with scleral lenses and eyedrops  # Oral GVHD: pred increase as above, dex s/s QID. Lichenoid changes prominent L-buccal mucosa. Good pain control during appointment on 9/9/24.     Previous therapies  Tacrolimus-previously decided to stay on same dose, no checks of levels if clinically stable, and no need switch to sirolimus. (keep tac low as gvhd is quiet and viremia). 5/10 level 45 with starting of COVID therapy and D-D intractions (Paxlovid for COVID19, which has a drug interaction with tacrolimus-Ritonavir increases serum levels of tacrolimus).   Tacrolimus level subtherapeutic, increase to 2.5 mg twice daily recently, 8/23/2022 instructed to change tacrolimus to 2 mg twice daily. 9/6 with mild NEGRA, hyperkalemia, hypomagnesemia, and reports some tremors and cramps, suspect tacrolimus to be high therefore empirically decreased to 1.5 twice daily, skip morning  dose of tacrolimus and took 2 mg today after his afternoon labs. Decrease to 1mg BID on Saturday.  Sirolimus  9/21 despite fluids and a dose adjustment of tacrolimus patient seem to have persistence NORBERTO related NEGRA, therefore after discussion with the patient decision was made to transition to sirolimus that was started on 9/21, patient initially seem to tolerate this well with normalization of kidney function  10/11 presented with flares of ocular and oral GVHD, fluid retention and gain of 5-6 kg, lower extremity edema, abdominal distention, total protein to creatinine ratio elevated at 0.4, in addition to elevation in BUN and creatinine, HTN.  Picture most consistent with sirolimus related side effects.  Less likely that the patient will tolerate even a lower level of sirolimus.  Therefore the patient was instructed to stop sirolimus, last dose on 10/10. 10/14 level 3.5 appropriately trending down.     Corticosteroids  3/1-3/16: prednisone 100mg every day  3/17 75mg every day   3/31 75 alt with 50  4/6: 75 alt with 25mg every other day  4/12 pred tapered to 60 alternating with 25mg   4/15 In setting of viruses trying to reactivate,GVHD stable: Taper 60/15mg alternating.  4/20 decrease pred further to 50/10.    4/25 taper pred to 50/0 every other day as long as symptoms stable.   5/2 Pred decrease 40/0 alternating every other day.   5/13 decrease to 30/0  5/20 decrease to 20/0 then slowly by 5 mg weekly  6/7 decrease to 10/0  Went up to 15/0 with mild increased LFTs  8/23/2022 increased to 20/0, with persistence of oral ulcers and active ocular GVHD.  Discussed with the patient the importance of stopping chewing of nicotine gums for prolonged hours as that would not help with the healing of the oral ulcers.  I discussed with the patient alternatives of nicotine patches that he will reconsider and let me know.  9/6 decreased to 15 alternating with 0  9/13 decreased to 10 alternating with 0  9/21 discontinued  tacrolimus given persistent NEGRA and single kidney and start the patient on sirolimus without loading dose.  We will get a list of possible side effects and adverse events from the Pharm.D. see sirolimus section above.  10/11 GVHD flare. Sirolimus stopped. Resume prednisone 40 mg daily as of 10/12, no change with mildly worsening eye pain 10/14  Reduce pred to 35mg 10/25 and 25mg 11/1 11/8 20 mg   11/22 15 mg  12/13/22 10 mg (plan to taper to 5mg then slow taper 1 mg per month given long pred history)  2/23/23 lower from 5 mg to 4 mg for the next month  3/28/2023 - 5/2/2023- 8/15/2023 continue on 10/4 given adrenal insufficieny with recent am cortisol check and clinical symptoms on taper to lower dose of 4 mg daily  -12/2023 had oral GVHD flare started on Prednisone 40 mg,  start taper to 40 and 30 mg alternating for 1 week then 30 mg daily for 1 week, then 30 and 20 alternating for 1 week, then 20 for 1 week till he sees me.  - 2/27/2024 decrease from 10/20mg prednisone to 10/15 then 10/10 -flared and now on 15mg with   - 4/2/2024  15mg  - 5/9/24 15/10  - 8/5/24 10 daily  - 9/9/24 15 mg daily ongoing    Belumosudil  Patient intolerant/with disease flares on tacrolimus and sirolimus see above.  Discussed with the patient the different options for therapy of chronic GVHD that are FDA approved.  Given the side effect profiles after discussing in detail and given the least nephrotoxicity and expected response, taking into account other metabolic effect as well.  We will proceed with prior authorization with belumosudil.  Patient was given material to review for possible expected adverse events and side effects with both Belumosodil and ruxolitinib.   --- Started on 10/18/2022 - skin/liver/oral GVHD inactive, and occular symptoms controlled/symptomatic improvement.   --- 10/10/2023 will hold belumosodil given recurrent infections ans significant improvement in symptoms with no end organ damage after discussions with him  and his wife. Will optimize topical treatment with scleral lenses, eyedrops ans dex s/sp int he interim to avoid flares. Will readdress need to restart systemic treatment pending infectious resolution and symptoms.      5.  ID: recent fever in immunocompromised host; mild leukocytosis; blood cx, RVP, covid pending    # history of Epidural abscess: Followed by Dr. Candelario ID, plan to be on IV ceftriaxone for at least 6 weeks course through 5/11/2023-to be determined on further follow-up.  See imaging with MRI follow-up as above.     #Recurrent discitis/OM still on treatment through October 2023, cx negative, managed with ID locally.  3/30/2023 blood culture with Staph epidermidis  3/31/2023 BONE, L5, FLUOROSCOPICALLY-GUIDED NEEDLE BIOPSY: Benign bone with trilineage hematopoiesis (normal) Negative for neoplasm Negative for acute osteomyelitis. DISC, L5-S1, ASPIRATION FOR CYTOLOGY: Acellular debris; no cellular material present for evaluation     #History of COVID x2 last 1/2023 and influenza    Treated with Paxlovid with persistent fatigue but no active respiratory symptoms    #History of pulmonary infiltrates:   4/20 CT chest with new RUL infiltrate. Highly immunocompromised. 4/22 Fungitell/asp GM were neg. BAL 4/29 NGTD, increased micafungin to 150mg IV daily-- f/u 6/1 Dr Garcia CT with mixed results, followed with Dr. Garcia August 2022 with resolution of pulmonary nodules and normalization of LFTs switched patient to posaconazole prophylactic dose and monitor LFTs and any infectious concerns closely. Monitor and low threshold check CT or add back if on higher dose steroids.     #History of CMV viremia:    - CMV viremia up to 1100. 4/15 started valcyte 900mg PO BID. 4/20 CMV <137, 5/10 ND, decreased to 450mg BID 4/30 - continue 4 weeks as long as CMV <137 till 5/28 + weekly CMV. Switched to acyclovir after completion of therapy 5/28/2022. recheck 3/1/23 ND    # History of Oral herpes  12/2023:HSV oral lesions; PCR  positive. Stopped ACV last month and lesions appears soon thereafter. Empirically Rx Valtrex. mild lingering URI sx; RVP + rhino (12/21); supportive cares. (CCT 12/1/23 neg).started on pred 40mg/d.  Completed Valtrex course and put back on acyclovir restarted 1/20/2024    # History of PNA/bronchitis 2/9/2024 sp doxycyline    # History of RSV PNA 3/26/2024, s/p ribavirin    #Hypogammaglobulinemia with recurrent infections: maintain IgG > 400       - PPx:   ---ACV  Patient developed oral herpes reactivation after stopping acyclovir therefore resumed on 1/9/2023  ---Posaconazole (previously on micafungin with LFts abl, hx of pulmonary nodules needign treatment dose). 11/14/2023 discontinue and check CT in 2 months given history completed December 2023 with no concerns for infections or nodular lesions. Monitor and low threshold check CT or add back if on higher dose steroids.  ---Pentamidine, future 4/22/2024  ---On Penicillin 250 bid px     - EBV viremia: 4/20 CAP CT (w/ contrast): No adenopathy. S/p 4/22 and 5/4/22 rituximab 375mg/m2 5/10 ND x2, 3/13/2024     - vaccinations: Previously deferred annual vaccines in the setting of GVHD and immunosuppression therapy. 11/14/2023 started vaccination series, including Shingrix, COVID-vaccine. 1/9/2023 Shingrix vaccine.      received his influenza/covid vaccine 8/24/2024 and RSV vaccine 8/13.     6. Endo:   - Hx of graves disease; On synthroid follows with endocrine  - HLD: 11/2022 cholesterol 355, triglycerides 153, -- 11/8 started rosuvastatin 5 mg daily, 11/22 CPK within normal, 5/2/2023 repeat lipid profile cholesterol down to 202, triglycerides 191, LDL now normal at 95.  We will continue same dose of rosuvastatin and add fish oil 1g BID (on hold). Repeated August with PCP, who added back fish oil    7. CV:   - Hypertension history   - TTE most recently 10/2022, ECHO in January 2024 at [facility]     8. GI:   - Omeprazole for heartburn/ GI protection  (steroids)  - LFTs as above, now normal. Off ursodiol     9. Psych:   - Situational anxiety - lexapro 10mg daily.   - Insomnia: worse on steroids. Ativan, trazadone, melatonin     10. MSK:   -History of left food drop, PT. Occasional muscle cramps discussed optimizing vitamin D levels and considering vitamin D, some of the symptomatology of muscle cramps are likely related to chronic GVHD.  No muscle cramps reported today.  -4/2023 new tearing of the right acetabular labrum anterosuperiorly referred to to orthopedic surgery.       11. HCM/Age appropriate cancer screening:  -Discussed with the patient importance of age-appropriate cancer screening including colonoscopies, he is due for this.  -Discussed importance of at least once a year vitamin D level check, 8/18/2022 wnl  -Screening for secondary iron overload, see heme section above  -Yearly TSH 2022 wnl; yearly lipid panel - see GI section above  -9/6/2022 DEXA scan Based on BMD diagnosis is consistent with normal bone density based on WHO criteria Ref. 1   -PFTs 9/20/22, see above. 9/21/2023 PFTs The FVC, FEV1, FEV1/FVC ratio and ZZX16-35% are within normal limits. FVC 99% (108), FEV1 91%, DLCO uncorrected 91%.  Repeat PFTs in 4 to 6 months.  -Metabolic other, 8/2022 testosterone within normal  -11/22/2022 discussed with the patient the challenges and the stresses of chronic illness and healthcare fatigue.  Patient had previously been on Lexapro that we restarted confirmed with pharmacy that there are no drug drug interactions.  Additionally we will referred patient to PMNR to help with physical rehab and strength to help with fatigue.  Our social workers will also reach out to Nik and his wife for further resources including support groups for patients with chronic illness and fatigue post transplant.  -Referral to palliative care for symptom and pain management including insomnia     Summary: He continues to have daily fevers in the morning accompanied by  symptoms of chills and occasional shivering.  The fevers seem to improve through the day although he remains feeling poorly with fatigue but no other clear localizing symptoms.  His ID workup so far has not revealed a source, and he did not respond to doxycycline that I gave to cover the possibility of a tickborne illness.  He does have a CT scan that shows groundglass opacities, but it is unclear that these are new.  Given the ongoing symptoms, we have arranged for him to see Dr. Garcia in pulmonary and we will also request ID consultation.    Plan:  -Extended respiratory viral panel today  -Repeat CMV and EBV  -Pulmonary consultation pending  -ID consultation requested    The longitudinal plan of care for the diagnosis(es)/condition(s) as documented were addressed during this visit. Due to the added complexity in care, I will continue to support Nik in the subsequent management and with ongoing continuity of care.      30 minutes spent on the date of the encounter doing chart review, history and exam, lab review, documentation and coordniating care.    DOMINIQUE BRITTON MD  October 25, 2024

## 2024-10-26 LAB — CMV DNA SPEC NAA+PROBE-ACNC: NOT DETECTED IU/ML

## 2024-10-27 LAB
EBV DNA SERPL NAA+PROBE-ACNC: <35 IU/ML
EBV DNA SERPL NAA+PROBE-LOG#: <1.5 {LOG_COPIES}/ML

## 2024-10-28 LAB — IGG SERPL-MCNC: 546 MG/DL (ref 610–1616)

## 2024-10-28 NOTE — CONFIDENTIAL NOTE
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

## 2024-10-29 ENCOUNTER — OFFICE VISIT (OUTPATIENT)
Dept: PULMONOLOGY | Facility: CLINIC | Age: 64
End: 2024-10-29
Attending: INTERNAL MEDICINE
Payer: MEDICARE

## 2024-10-29 VITALS
DIASTOLIC BLOOD PRESSURE: 76 MMHG | SYSTOLIC BLOOD PRESSURE: 113 MMHG | OXYGEN SATURATION: 96 % | HEART RATE: 77 BPM | WEIGHT: 260 LBS | BODY MASS INDEX: 34.46 KG/M2 | HEIGHT: 73 IN

## 2024-10-29 DIAGNOSIS — T86.09 GVHD AS COMPLICATION OF BONE MARROW TRANSPLANT (H): ICD-10-CM

## 2024-10-29 DIAGNOSIS — D89.813 GVHD AS COMPLICATION OF BONE MARROW TRANSPLANT (H): ICD-10-CM

## 2024-10-29 DIAGNOSIS — R93.89 ABNORMAL CHEST CT: Primary | ICD-10-CM

## 2024-10-29 PROCEDURE — 99214 OFFICE O/P EST MOD 30 MIN: CPT | Performed by: INTERNAL MEDICINE

## 2024-10-29 PROCEDURE — G0463 HOSPITAL OUTPT CLINIC VISIT: HCPCS | Performed by: INTERNAL MEDICINE

## 2024-10-29 ASSESSMENT — PAIN SCALES - GENERAL: PAINLEVEL_OUTOF10: NO PAIN (0)

## 2024-10-29 NOTE — PROGRESS NOTES
Reason for Visit  Nik Nava is a 64 year old year old male who is being seen for Pulm BMT (Return pt)    Pulmonary HPI  61 YO with ALL s/p PBSCT in 8-21 (Day + 261) referred to the Pulmonary clinic by Dr. Hill for an evaluation of an abnormal chest CT.  Denies previous history of pulmonary disease. At present no cough, SOB or sputum production.  No fevers or chills.  Diagnosed with cGvHD of eyes, skin, GI tract and liver.  Was placed on high dose steroids (3-1-22) at 100 mg per day; since that time has been tapering down dose, currently on 50 mg every other day. Denies known mold exposure but did work in the past in a furniture delivery business, worked in a warehouse.  Not currently working.  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  Denies mold exposure at home.  Has cabin but has not been there this Winter.  No tobacco x 3 years, smoked since age 17, few packs per day.  FH- lung cancer in mother.     Last seen over two years ago.  Referred back to the clinic for a recent abnormal chest CT.  Developed fevers on 10-14 to 101.5 with associated chills, fatigue and decreased appetite.  Was up at cabin removing docks the day prior to the onset of fevers. Denies rash, no cough or sputum production, no GI symptoms, no sinus drainage or symptoms.  Denies any obvious mold exposured did have mold in garage but this was professionally remediated and he was not in the area when the work was done; wore a N95 mask when was in the garage two weeks ago.  Removed docks but denies being in the water or exposure to vegetation, bird droppings or animal nests; no exposure to mice or mouse droppings.  All infectious work-up has been negative including Fungitell, Aspergillus galactomannan, Legionella and Strept antigen.  Was treated with a course of doxycycline without improvement.    The patient was seen and examined by Murtaza Garcia MD           Current Outpatient Medications   Medication Sig Dispense Refill    acyclovir  (ZOVIRAX) 400 MG tablet Take 2 tablets (800 mg) by mouth every 12 hours 360 tablet 3    amLODIPine (NORVASC) 5 MG tablet Take 1 tablet (5 mg) by mouth daily 90 tablet 3    Carboxymethylcell-Glycerin PF (REFRESH RELIEVA PF) 0.5-1 % SOLN Apply 1 drop to eye 4 times daily Preservative free. Either PF multidose bottle or PF vials 30 each 11    dexAMETHasone alcohol-free (DECADRON) 0.1 MG/ML solution Swish and spit 10 mLs (1 mg) in mouth 2 times daily. 500 mL 2    escitalopram (LEXAPRO) 10 MG tablet Take 1 tablet (10 mg) by mouth daily 30 tablet 3    fluorometholone (FML LIQUIFILM) 0.1 % ophthalmic suspension Place 1 drop Into the left eye 3 times daily. 5 mL 1    levothyroxine (SYNTHROID/LEVOTHROID) 112 MCG tablet Take 1 tablet (112 mcg) by mouth daily 90 tablet 4    LORazepam (ATIVAN) 1 MG tablet Take 1 mg by mouth every 6 hours as needed for anxiety. PRN      metroNIDAZOLE (METROCREAM) 0.75 % external cream Apply topically 2 times daily Apply to the nose. 45 g 3    nicotine polacrilex (NICORETTE) 4 MG gum Place 4 mg inside cheek daily.      Nutritional Supplements (ENSURE COMPLETE SHAKE) LIQD Take 11 mLs by mouth every morning      omeprazole (PRILOSEC) 40 MG DR capsule Take 1 capsule (40 mg) by mouth daily 30 capsule 3    penicillin V (VEETID) 250 MG tablet Take 1 Tablet (250 mg) by mouth two times daily. 60 tablet 1    predniSONE (DELTASONE) 10 MG tablet Take 1 tablet (10 mg) by mouth daily 90 tablet 2    predniSONE (DELTASONE) 5 MG tablet Take 1 tablet (5 mg) by mouth daily. Currently taking 15 mg daily ( on taper) 90 tablet 1    rosuvastatin (CRESTOR) 5 MG tablet Take 1 tablet (5 mg) by mouth daily. 30 tablet 3    sennosides (SENOKOT) 8.6 MG tablet Take 1 tablet by mouth 3 times daily as needed for constipation 90 tablet 0    sodium chloride 0.9 % neb solution 3 mLs by Other route 2 times daily 300 mL 11    tacrolimus (PROTOPIC) 0.1 % external ointment Apply topically 2 times daily 30 g 1    doxycycline hyclate  (VIBRAMYCIN) 100 MG capsule Take 1 capsule (100 mg) by mouth 2 times daily. (Patient not taking: Reported on 10/29/2024) 14 capsule 0    PREVIDENT 5000 BOOSTER PLUS 1.1 % PSTE dental paste  (Patient not taking: Reported on 10/29/2024)       No current facility-administered medications for this visit.     Allergies   Allergen Reactions    Sulfamethoxazole-Trimethoprim Rash     Past Medical History:   Diagnosis Date    ALL (acute lymphocytic leukemia) (H)     CKD (chronic kidney disease)     GVHD (graft versus host disease) (H)     H/O peripheral stem cell transplant (H)     Hyperthyroidism 1996    Infection due to 2019 novel coronavirus 01/16/2023    Influenza A 11/2022    Postablative hypothyroidism 1997    Pulmonary embolism (H)     Renal cell carcinoma (H) 2007    right kidney    Sleep apnea        Past Surgical History:   Procedure Laterality Date    APPENDECTOMY      BACK SURGERY  2017    BONE MARROW BIOPSY, BONE SPECIMEN, NEEDLE/TROCAR Left 09/02/2021    Procedure: BIOPSY, BONE MARROW;  Surgeon: Jailyn Ny APRN CNP;  Location: [facility] OR    BONE MARROW BIOPSY, BONE SPECIMEN, NEEDLE/TROCAR Left 11/15/2021    Procedure: BIOPSY, BONE MARROW;  Surgeon: Socrates De La Torre;  Location: [facility] OR    BONE MARROW BIOPSY, BONE SPECIMEN, NEEDLE/TROCAR Left 02/07/2022    Procedure: BIOPSY, BONE MARROW;  Surgeon: Renetta Wiggins PA-C;  Location: [facility] OR    BONE MARROW BIOPSY, BONE SPECIMEN, NEEDLE/TROCAR Left 08/18/2022    Procedure: BIOPSY, BONE MARROW;  Surgeon: Lorrie Yap PA-C;  Location: [facility] OR    BONE MARROW BIOPSY, BONE SPECIMEN, NEEDLE/TROCAR Left 08/07/2023    Procedure: Bone marrow biopsy, bone specimen, needle/trocar;  Surgeon: Teressa Rick PA-C;  Location: [facility] OR    BRONCHOSCOPY (RIGID OR FLEXIBLE), DIAGNOSTIC N/A 04/29/2022    Procedure: BRONCHOSCOPY, WITH BRONCHOALVEOLAR LAVAGE;  Surgeon: Murtaza Garcia MD;  Location: [facility]    IR CVC TUNNEL PLACEMENT > 5 YRS OF AGE  08/04/2021     IR CVC TUNNEL REMOVAL LEFT  2021    IR LIVER BIOPSY PERCUTANEOUS  2022    NEPHRECTOMY  2007    ORTHOPEDIC SURGERY      bilateral shoulder surgeries    PERCUTANEOUS BIOPSY LIVER N/A 2022    Procedure: NEEDLE BIOPSY, LIVER, PERCUTANEOUS;  Surgeon: Trace Castellanos MD;  Location: [facility] OR    PHACOEMULSIFICATION WITH STANDARD INTRAOCULAR LENS IMPLANT Left 1/15/2024    Procedure: LEFT EYE PHACOEMULSIFICATION, CATARACT, WITH STANDARD INTRAOCULAR LENS IMPLANT INSERTION;  Surgeon: Deshawn Menezes MD;  Location: [facility] OR    PHACOEMULSIFICATION WITH STANDARD INTRAOCULAR LENS IMPLANT Right 2024    Procedure: RIGHT EYE PHACOEMULSIFICATION, CATARACT, WITH STANDARD INTRAOCULAR LENS IMPLANT INSERTION;  Surgeon: Deshawn Menezes MD;  Location: [facility] OR       Social History     Socioeconomic History    Marital status:      Spouse name: Not on file    Number of children: Not on file    Years of education: Not on file    Highest education level: Not on file   Occupational History    Not on file   Tobacco Use    Smoking status: Former     Current packs/day: 0.00     Average packs/day: 2.0 packs/day for 30.0 years (60.0 ttl pk-yrs)     Types: Cigarettes     Start date: 1989     Quit date: 2019     Years since quittin.4     Passive exposure: Past    Smokeless tobacco: Never    Tobacco comments:     DAILY USE OF NICORETTE GUM   Vaping Use    Vaping status: Never Used   Substance and Sexual Activity    Alcohol use: Not Currently    Drug use: Never    Sexual activity: Not on file   Other Topics Concern    Parent/sibling w/ CABG, MI or angioplasty before 65F 55M? Not Asked   Social History Narrative    Not on file     Social Drivers of Health     Financial Resource Strain: Low Risk  (2023)    [facility]    Financial Resource Strain     Difficulty of Paying Living Expenses: 3     Difficulty of  "Paying Living Expenses: Not on file   Food Insecurity: No Food Insecurity (5/30/2024)    Received from [facility]    Food Insecurity     Do you worry your food will run out before you are able to buy more?: 1   Transportation Needs: No Transportation Needs (5/30/2024)    Received from [facility]    Transportation Needs     Does lack of transportation keep you from medical appointments?: 1     Does lack of transportation keep you from work, meetings or getting things that you need?: 1   Physical Activity: Not on file   Stress: Not on file   Social Connections: Socially Integrated (5/30/2024)    Received from [facility]    Social Connections     Do you often feel lonely or isolated from those around you?: 0   Interpersonal Safety: Not At Risk (9/20/2022)    Humiliation, Afraid, Rape, and Kick questionnaire     Fear of Current or Ex-Partner: No     Emotionally Abused: No     Physically Abused: No     Sexually Abused: No   Housing Stability: Low Risk  (5/30/2024)    Received from [facility]    Housing Stability     What is your housing situation today?: 1       ROS Pulmonary  A complete ROS was otherwise negative except as noted in the HPI.  /76 (BP Location: Right arm, Patient Position: Sitting)   Pulse 77   Ht 1.854 m (6' 1\")   Wt 117.9 kg (260 lb)   SpO2 96%   BMI 34.30 kg/m    Exam:   GENERAL APPEARANCE: Well developed, well nourished, alert, and in no apparent distress.  EYES: PERRL, EOMI  HENT: Nasal mucosa with no edema and no hyperemia. No nasal polyps.  EARS: Canals clear, TMs normal  MOUTH: Oral mucosa is moist, without any lesions, no tonsillar enlargement, no oropharyngeal exudate.  NECK: supple, no masses, no thyromegaly.  LYMPHATICS: No significant axillary, cervical, or supraclavicular nodes.  RESP:  Good air flow throughout.  No crackles. No rhonchi. No " wheezes.  CV: Normal S1, S2, regular rhythm, normal rate. No murmur.  No rub. No gallop. No LE edema.   ABDOMEN:  Bowel sounds normal, soft, nontender, no HSM or masses.   MS: extremities normal. No clubbing. No cyanosis.  SKIN: no rash on limited exam  NEURO: Mentation intact, speech normal, normal strength and tone, normal gait and stance  PSYCH: mentation appears normal. and affect normal/bright  Results:  Recent Results (from the past week)   IgG    Collection Time: 10/25/24  1:03 PM   Result Value Ref Range    Immunoglobulin G 546 (L) 610 - 1,616 mg/dL   Comprehensive metabolic panel    Collection Time: 10/25/24  1:03 PM   Result Value Ref Range    Sodium 136 135 - 145 mmol/L    Potassium 4.6 3.4 - 5.3 mmol/L    Carbon Dioxide (CO2) 23 22 - 29 mmol/L    Anion Gap 11 7 - 15 mmol/L    Urea Nitrogen 33.8 (H) 8.0 - 23.0 mg/dL    Creatinine 1.24 (H) 0.67 - 1.17 mg/dL    GFR Estimate 64 >60 mL/min/1.73m2    Calcium 10.1 8.8 - 10.4 mg/dL    Chloride 102 98 - 107 mmol/L    Glucose 114 (H) 70 - 99 mg/dL    Alkaline Phosphatase 86 40 - 150 U/L    AST 27 0 - 45 U/L    ALT 18 0 - 70 U/L    Protein Total 6.7 6.4 - 8.3 g/dL    Albumin 3.9 3.5 - 5.2 g/dL    Bilirubin Total 0.5 <=1.2 mg/dL   Vitamin D Deficiency    Collection Time: 10/25/24  1:03 PM   Result Value Ref Range    Vitamin D, Total (25-Hydroxy) 40 20 - 50 ng/mL   Parathyroid Hormone Intact    Collection Time: 10/25/24  1:03 PM   Result Value Ref Range    Parathyroid Hormone Intact 36 15 - 65 pg/mL   CBC with platelets and differential    Collection Time: 10/25/24  1:03 PM   Result Value Ref Range    WBC Count 12.4 (H) 4.0 - 11.0 10e3/uL    RBC Count 3.61 (L) 4.40 - 5.90 10e6/uL    Hemoglobin 12.0 (L) 13.3 - 17.7 g/dL    Hematocrit 34.5 (L) 40.0 - 53.0 %    MCV 96 78 - 100 fL    MCH 33.2 (H) 26.5 - 33.0 pg    MCHC 34.8 31.5 - 36.5 g/dL    RDW 14.8 10.0 - 15.0 %    Platelet Count 240 150 - 450 10e3/uL    % Neutrophils 74 %    % Lymphocytes 18 %    % Monocytes 6 %     % Eosinophils 0 %    % Basophils 0 %    % Immature Granulocytes 1 %    NRBCs per 100 WBC 0 <1 /100    Absolute Neutrophils 9.2 (H) 1.6 - 8.3 10e3/uL    Absolute Lymphocytes 2.2 0.8 - 5.3 10e3/uL    Absolute Monocytes 0.8 0.0 - 1.3 10e3/uL    Absolute Eosinophils 0.1 0.0 - 0.7 10e3/uL    Absolute Basophils 0.0 0.0 - 0.2 10e3/uL    Absolute Immature Granulocytes 0.1 <=0.4 10e3/uL    Absolute NRBCs 0.0 10e3/uL   Phosphorus    Collection Time: 10/25/24  1:03 PM   Result Value Ref Range    Phosphorus 2.9 2.5 - 4.5 mg/dL   Respiratory Panel PCR    Collection Time: 10/25/24  1:41 PM    Specimen: Nasopharyngeal; Swab   Result Value Ref Range    Adenovirus Not Detected Not Detected    Coronavirus Not Detected Not Detected    Human Metapneumovirus Not Detected Not Detected    Human Rhin/Enterovirus Not Detected Not Detected    Influenza A Not Detected Not Detected    Influenza A, H1 Not Detected Not Detected    Influenza A 2009 H1N1 Not Detected Not Detected    Influenza A, H3 Not Detected Not Detected    Influenza B Not Detected Not Detected    Parainfluenza Virus 1 Not Detected Not Detected    Parainfluenza Virus 2 Not Detected Not Detected    Parainfluenza Virus 3 Not Detected Not Detected    Parainfluenza Virus 4 Not Detected Not Detected    Respiratory Syncytial Virus A Not Detected Not Detected    Respiratory Syncytial Virus B Not Detected Not Detected    Chlamydia Pneumoniae Not Detected Not Detected    Mycoplasma Pneumoniae Not Detected Not Detected   Protein  random urine    Collection Time: 10/25/24  2:02 PM   Result Value Ref Range    Total Protein Urine mg/dL 24.3   mg/dL    Total Protein Urine mg/mg Creat 0.15 0.00 - 0.20 mg/mg Cr    Creatinine Urine mg/dL 159.0 mg/dL   UA with Microscopic    Collection Time: 10/25/24  2:02 PM   Result Value Ref Range    Color Urine Yellow Colorless, Straw, Light Yellow, Yellow    Appearance Urine Clear Clear    Glucose Urine Negative Negative mg/dL    Bilirubin Urine Negative  Negative    Ketones Urine Negative Negative mg/dL    Specific Gravity Urine 1.024 1.003 - 1.035    Blood Urine Negative Negative    pH Urine 6.0 5.0 - 7.0    Protein Albumin Urine 20 (A) Negative mg/dL    Urobilinogen Urine Normal Normal, 2.0 mg/dL    Nitrite Urine Negative Negative    Leukocyte Esterase Urine Negative Negative    Mucus Urine Present (A) None Seen /LPF    RBC Urine 1 <=2 /HPF    WBC Urine <1 <=5 /HPF   CMV Quantitative, PCR    Collection Time: 10/25/24  2:46 PM    Specimen: Arm, Left; Blood   Result Value Ref Range    CMV DNA IU/mL Not Detected Not Detected IU/mL   Musa Barr Virus Quantitative PCR, Plasma    Collection Time: 10/25/24  2:46 PM    Specimen: Arm, Left; Blood   Result Value Ref Range    EBV DNA IU/mL <35 (A) Not Detected IU/mL    EBV DNA Log IU/mL <1.5        Assessment and plan:   65 YO with ALL s/p BMT complicated by cGvHD requiring high dose steroids.  Now with nodular infiltrate on CT concerning for an atypical infection (Fungal, Nocardia, or atypical Mycobacteria).  Needed further invasive testing to attempt to identify a pathogen.  Follow-up CT when last seen showed mixed results- some areas are better, some worse.  Past COVID19 with findings on CT potentially secondary to COVID versus fungal infection.  Now seen back in clinic with CT showing recurrence of GGO.  Taken together with fevers, concerns for infection including atypical infection.  Needs further invasive testing to assist in identifying a pathogen.     Bronchoscopy with BAL.  Will send for immunocompromised panel  Would start emperic antifungal after the bronchoscopy.  I will follow-up on the BAL results  Repeat chest CT in one month     RTC in one month with repeat chest CT.  Patient advised to contact the clinic with any questions or concerns prior to his next clinic visit

## 2024-10-29 NOTE — NURSING NOTE
Nik is a 64 year old that presents in clinic today for the following:     Chief Complaint   Patient presents with    Pulm BMT     Return pt         Screenings from encounters over the past 10 days    No data recorded       Renetta Marin at 2:22 PM on 10/29/2024

## 2024-10-29 NOTE — LETTER
10/29/2024      Nik Nava  [address] [facility] [address]      Dear Colleague,    Thank you for referring your patient, Nik Nava, to the [facility] FOR LUNG SCIENCE AND [facility] [place]. Please see a copy of my visit note below.    Reason for Visit  Nik Nava is a 64 year old year old male who is being seen for Pulm BMT (Return pt)    Pulmonary HPI  61 YO with ALL s/p PBSCT in 8-21 (Day + 261) referred to the Pulmonary clinic by Dr. Hill for an evaluation of an abnormal chest CT.  Denies previous history of pulmonary disease. At present no cough, SOB or sputum production.  No fevers or chills.  Diagnosed with cGvHD of eyes, skin, GI tract and liver.  Was placed on high dose steroids (3-1-22) at 100 mg per day; since that time has been tapering down dose, currently on 50 mg every other day. Denies known mold exposure but did work in the past in a furniture delivery business, worked in a warehouse.  Not currently working.  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  Denies mold exposure at home.  Has cabin but has not been there this Winter.  No tobacco x 3 years, smoked since age 17, few packs per day.  FH- lung cancer in mother.     Last seen over two years ago.  Referred back to the clinic for a recent abnormal chest CT.  Developed fevers on 10-14 to 101.5 with associated chills, fatigue and decreased appetite.  Was up at cabin removing docks the day prior to the onset of fevers. Denies rash, no cough or sputum production, no GI symptoms, no sinus drainage or symptoms.  Denies any obvious mold exposured did have mold in garage but this was professionally remediated and he was not in the area when the work was done; wore a N95 mask when was in the garage two weeks ago.  Removed docks but denies being in the water or exposure to vegetation, bird droppings or animal nests; no exposure to mice or mouse droppings.  All infectious work-up has been negative including  Fungitell, Aspergillus galactomannan, Legionella and Strept antigen.  Was treated with a course of doxycycline without improvement.    The patient was seen and examined by Murtaza Garcia MD           Current Outpatient Medications   Medication Sig Dispense Refill     acyclovir (ZOVIRAX) 400 MG tablet Take 2 tablets (800 mg) by mouth every 12 hours 360 tablet 3     amLODIPine (NORVASC) 5 MG tablet Take 1 tablet (5 mg) by mouth daily 90 tablet 3     Carboxymethylcell-Glycerin PF (REFRESH RELIEVA PF) 0.5-1 % SOLN Apply 1 drop to eye 4 times daily Preservative free. Either PF multidose bottle or PF vials 30 each 11     dexAMETHasone alcohol-free (DECADRON) 0.1 MG/ML solution Swish and spit 10 mLs (1 mg) in mouth 2 times daily. 500 mL 2     escitalopram (LEXAPRO) 10 MG tablet Take 1 tablet (10 mg) by mouth daily 30 tablet 3     fluorometholone (FML LIQUIFILM) 0.1 % ophthalmic suspension Place 1 drop Into the left eye 3 times daily. 5 mL 1     levothyroxine (SYNTHROID/LEVOTHROID) 112 MCG tablet Take 1 tablet (112 mcg) by mouth daily 90 tablet 4     LORazepam (ATIVAN) 1 MG tablet Take 1 mg by mouth every 6 hours as needed for anxiety. PRN       metroNIDAZOLE (METROCREAM) 0.75 % external cream Apply topically 2 times daily Apply to the nose. 45 g 3     nicotine polacrilex (NICORETTE) 4 MG gum Place 4 mg inside cheek daily.       Nutritional Supplements (ENSURE COMPLETE SHAKE) LIQD Take 11 mLs by mouth every morning       omeprazole (PRILOSEC) 40 MG DR capsule Take 1 capsule (40 mg) by mouth daily 30 capsule 3     penicillin V (VEETID) 250 MG tablet Take 1 Tablet (250 mg) by mouth two times daily. 60 tablet 1     predniSONE (DELTASONE) 10 MG tablet Take 1 tablet (10 mg) by mouth daily 90 tablet 2     predniSONE (DELTASONE) 5 MG tablet Take 1 tablet (5 mg) by mouth daily. Currently taking 15 mg daily ( on taper) 90 tablet 1     rosuvastatin (CRESTOR) 5 MG tablet Take 1 tablet (5 mg) by mouth daily. 30 tablet 3      sennosides (SENOKOT) 8.6 MG tablet Take 1 tablet by mouth 3 times daily as needed for constipation 90 tablet 0     sodium chloride 0.9 % neb solution 3 mLs by Other route 2 times daily 300 mL 11     tacrolimus (PROTOPIC) 0.1 % external ointment Apply topically 2 times daily 30 g 1     doxycycline hyclate (VIBRAMYCIN) 100 MG capsule Take 1 capsule (100 mg) by mouth 2 times daily. (Patient not taking: Reported on 10/29/2024) 14 capsule 0     PREVIDENT 5000 BOOSTER PLUS 1.1 % PSTE dental paste  (Patient not taking: Reported on 10/29/2024)       No current facility-administered medications for this visit.     Allergies   Allergen Reactions     Sulfamethoxazole-Trimethoprim Rash     Past Medical History:   Diagnosis Date     ALL (acute lymphocytic leukemia) (H)      CKD (chronic kidney disease)      GVHD (graft versus host disease) (H)      H/O peripheral stem cell transplant (H)      Hyperthyroidism 1996     Infection due to 2019 novel coronavirus 01/16/2023     Influenza A 11/2022     Postablative hypothyroidism 1997     Pulmonary embolism (H)      Renal cell carcinoma (H) 2007    right kidney     Sleep apnea        Past Surgical History:   Procedure Laterality Date     APPENDECTOMY       BACK SURGERY  2017     BONE MARROW BIOPSY, BONE SPECIMEN, NEEDLE/TROCAR Left 09/02/2021    Procedure: BIOPSY, BONE MARROW;  Surgeon: Jailyn Ny APRN CNP;  Location: [facility] OR     BONE MARROW BIOPSY, BONE SPECIMEN, NEEDLE/TROCAR Left 11/15/2021    Procedure: BIOPSY, BONE MARROW;  Surgeon: Socrates De La Torre;  Location: [facility] OR     BONE MARROW BIOPSY, BONE SPECIMEN, NEEDLE/TROCAR Left 02/07/2022    Procedure: BIOPSY, BONE MARROW;  Surgeon: Renetta Wiggins PA-C;  Location: [facility] OR     BONE MARROW BIOPSY, BONE SPECIMEN, NEEDLE/TROCAR Left 08/18/2022    Procedure: BIOPSY, BONE MARROW;  Surgeon: Lorrie Yap PA-C;  Location: [facility] OR     BONE MARROW BIOPSY, BONE SPECIMEN, NEEDLE/TROCAR Left 08/07/2023    Procedure:  Bone marrow biopsy, bone specimen, needle/trocar;  Surgeon: Teressa Rick PA-C;  Location: [facility] OR     BRONCHOSCOPY (RIGID OR FLEXIBLE), DIAGNOSTIC N/A 2022    Procedure: BRONCHOSCOPY, WITH BRONCHOALVEOLAR LAVAGE;  Surgeon: Murtaza Garcia MD;  Location: [facility]     IR CVC TUNNEL PLACEMENT > 5 YRS OF AGE  2021     IR CVC TUNNEL REMOVAL LEFT  2021     IR LIVER BIOPSY PERCUTANEOUS  2022     NEPHRECTOMY  2007     ORTHOPEDIC SURGERY      bilateral shoulder surgeries     PERCUTANEOUS BIOPSY LIVER N/A 2022    Procedure: NEEDLE BIOPSY, LIVER, PERCUTANEOUS;  Surgeon: Trace Castellanos MD;  Location: [facility] OR     PHACOEMULSIFICATION WITH STANDARD INTRAOCULAR LENS IMPLANT Left 1/15/2024    Procedure: LEFT EYE PHACOEMULSIFICATION, CATARACT, WITH STANDARD INTRAOCULAR LENS IMPLANT INSERTION;  Surgeon: Deshawn Menezes MD;  Location: [facility] OR     PHACOEMULSIFICATION WITH STANDARD INTRAOCULAR LENS IMPLANT Right 2024    Procedure: RIGHT EYE PHACOEMULSIFICATION, CATARACT, WITH STANDARD INTRAOCULAR LENS IMPLANT INSERTION;  Surgeon: Deshawn Menezes MD;  Location: [facility] OR       Social History     Socioeconomic History     Marital status:      Spouse name: Not on file     Number of children: Not on file     Years of education: Not on file     Highest education level: Not on file   Occupational History     Not on file   Tobacco Use     Smoking status: Former     Current packs/day: 0.00     Average packs/day: 2.0 packs/day for 30.0 years (60.0 ttl pk-yrs)     Types: Cigarettes     Start date: 1989     Quit date: 2019     Years since quittin.4     Passive exposure: Past     Smokeless tobacco: Never     Tobacco comments:     DAILY USE OF NICORETTE GUM   Vaping Use     Vaping status: Never Used   Substance and Sexual Activity     Alcohol use: Not Currently     Drug use: Never     Sexual activity: Not on file   Other Topics Concern     Parent/sibling w/ CABG, MI or  "angioplasty before 65F 55M? Not Asked   Social History Narrative     Not on file     Social Drivers of Health     Financial Resource Strain: Low Risk  (4/7/2023)    Received from [facility], [facility]    Financial Resource Strain      Difficulty of Paying Living Expenses: 3      Difficulty of Paying Living Expenses: Not on file   Food Insecurity: No Food Insecurity (5/30/2024)    Received from [facility]    Food Insecurity      Do you worry your food will run out before you are able to buy more?: 1   Transportation Needs: No Transportation Needs (5/30/2024)    Received from [facility]    Transportation Needs      Does lack of transportation keep you from medical appointments?: 1      Does lack of transportation keep you from work, meetings or getting things that you need?: 1   Physical Activity: Not on file   Stress: Not on file   Social Connections: Socially Integrated (5/30/2024)    Received from [facility]    Social Connections      Do you often feel lonely or isolated from those around you?: 0   Interpersonal Safety: Not At Risk (9/20/2022)    Humiliation, Afraid, Rape, and Kick questionnaire      Fear of Current or Ex-Partner: No      Emotionally Abused: No      Physically Abused: No      Sexually Abused: No   Housing Stability: Low Risk  (5/30/2024)    Received from [facility]    Housing Stability      What is your housing situation today?: 1       ROS Pulmonary  A complete ROS was otherwise negative except as noted in the HPI.  /76 (BP Location: Right arm, Patient Position: Sitting)   Pulse 77   Ht 1.854 m (6' 1\")   Wt 117.9 kg (260 lb)   SpO2 96%   BMI 34.30 kg/m    Exam:   GENERAL APPEARANCE: Well developed, well nourished, alert, and in no apparent distress.  EYES: PERRL, EOMI  HENT: Nasal " mucosa with no edema and no hyperemia. No nasal polyps.  EARS: Canals clear, TMs normal  MOUTH: Oral mucosa is moist, without any lesions, no tonsillar enlargement, no oropharyngeal exudate.  NECK: supple, no masses, no thyromegaly.  LYMPHATICS: No significant axillary, cervical, or supraclavicular nodes.  RESP:  Good air flow throughout.  No crackles. No rhonchi. No wheezes.  CV: Normal S1, S2, regular rhythm, normal rate. No murmur.  No rub. No gallop. No LE edema.   ABDOMEN:  Bowel sounds normal, soft, nontender, no HSM or masses.   MS: extremities normal. No clubbing. No cyanosis.  SKIN: no rash on limited exam  NEURO: Mentation intact, speech normal, normal strength and tone, normal gait and stance  PSYCH: mentation appears normal. and affect normal/bright  Results:  Recent Results (from the past week)   IgG    Collection Time: 10/25/24  1:03 PM   Result Value Ref Range    Immunoglobulin G 546 (L) 610 - 1,616 mg/dL   Comprehensive metabolic panel    Collection Time: 10/25/24  1:03 PM   Result Value Ref Range    Sodium 136 135 - 145 mmol/L    Potassium 4.6 3.4 - 5.3 mmol/L    Carbon Dioxide (CO2) 23 22 - 29 mmol/L    Anion Gap 11 7 - 15 mmol/L    Urea Nitrogen 33.8 (H) 8.0 - 23.0 mg/dL    Creatinine 1.24 (H) 0.67 - 1.17 mg/dL    GFR Estimate 64 >60 mL/min/1.73m2    Calcium 10.1 8.8 - 10.4 mg/dL    Chloride 102 98 - 107 mmol/L    Glucose 114 (H) 70 - 99 mg/dL    Alkaline Phosphatase 86 40 - 150 U/L    AST 27 0 - 45 U/L    ALT 18 0 - 70 U/L    Protein Total 6.7 6.4 - 8.3 g/dL    Albumin 3.9 3.5 - 5.2 g/dL    Bilirubin Total 0.5 <=1.2 mg/dL   Vitamin D Deficiency    Collection Time: 10/25/24  1:03 PM   Result Value Ref Range    Vitamin D, Total (25-Hydroxy) 40 20 - 50 ng/mL   Parathyroid Hormone Intact    Collection Time: 10/25/24  1:03 PM   Result Value Ref Range    Parathyroid Hormone Intact 36 15 - 65 pg/mL   CBC with platelets and differential    Collection Time: 10/25/24  1:03 PM   Result Value Ref Range     WBC Count 12.4 (H) 4.0 - 11.0 10e3/uL    RBC Count 3.61 (L) 4.40 - 5.90 10e6/uL    Hemoglobin 12.0 (L) 13.3 - 17.7 g/dL    Hematocrit 34.5 (L) 40.0 - 53.0 %    MCV 96 78 - 100 fL    MCH 33.2 (H) 26.5 - 33.0 pg    MCHC 34.8 31.5 - 36.5 g/dL    RDW 14.8 10.0 - 15.0 %    Platelet Count 240 150 - 450 10e3/uL    % Neutrophils 74 %    % Lymphocytes 18 %    % Monocytes 6 %    % Eosinophils 0 %    % Basophils 0 %    % Immature Granulocytes 1 %    NRBCs per 100 WBC 0 <1 /100    Absolute Neutrophils 9.2 (H) 1.6 - 8.3 10e3/uL    Absolute Lymphocytes 2.2 0.8 - 5.3 10e3/uL    Absolute Monocytes 0.8 0.0 - 1.3 10e3/uL    Absolute Eosinophils 0.1 0.0 - 0.7 10e3/uL    Absolute Basophils 0.0 0.0 - 0.2 10e3/uL    Absolute Immature Granulocytes 0.1 <=0.4 10e3/uL    Absolute NRBCs 0.0 10e3/uL   Phosphorus    Collection Time: 10/25/24  1:03 PM   Result Value Ref Range    Phosphorus 2.9 2.5 - 4.5 mg/dL   Respiratory Panel PCR    Collection Time: 10/25/24  1:41 PM    Specimen: Nasopharyngeal; Swab   Result Value Ref Range    Adenovirus Not Detected Not Detected    Coronavirus Not Detected Not Detected    Human Metapneumovirus Not Detected Not Detected    Human Rhin/Enterovirus Not Detected Not Detected    Influenza A Not Detected Not Detected    Influenza A, H1 Not Detected Not Detected    Influenza A 2009 H1N1 Not Detected Not Detected    Influenza A, H3 Not Detected Not Detected    Influenza B Not Detected Not Detected    Parainfluenza Virus 1 Not Detected Not Detected    Parainfluenza Virus 2 Not Detected Not Detected    Parainfluenza Virus 3 Not Detected Not Detected    Parainfluenza Virus 4 Not Detected Not Detected    Respiratory Syncytial Virus A Not Detected Not Detected    Respiratory Syncytial Virus B Not Detected Not Detected    Chlamydia Pneumoniae Not Detected Not Detected    Mycoplasma Pneumoniae Not Detected Not Detected   Protein  random urine    Collection Time: 10/25/24  2:02 PM   Result Value Ref Range    Total Protein  Urine mg/dL 24.3   mg/dL    Total Protein Urine mg/mg Creat 0.15 0.00 - 0.20 mg/mg Cr    Creatinine Urine mg/dL 159.0 mg/dL   UA with Microscopic    Collection Time: 10/25/24  2:02 PM   Result Value Ref Range    Color Urine Yellow Colorless, Straw, Light Yellow, Yellow    Appearance Urine Clear Clear    Glucose Urine Negative Negative mg/dL    Bilirubin Urine Negative Negative    Ketones Urine Negative Negative mg/dL    Specific Gravity Urine 1.024 1.003 - 1.035    Blood Urine Negative Negative    pH Urine 6.0 5.0 - 7.0    Protein Albumin Urine 20 (A) Negative mg/dL    Urobilinogen Urine Normal Normal, 2.0 mg/dL    Nitrite Urine Negative Negative    Leukocyte Esterase Urine Negative Negative    Mucus Urine Present (A) None Seen /LPF    RBC Urine 1 <=2 /HPF    WBC Urine <1 <=5 /HPF   CMV Quantitative, PCR    Collection Time: 10/25/24  2:46 PM    Specimen: Arm, Left; Blood   Result Value Ref Range    CMV DNA IU/mL Not Detected Not Detected IU/mL   Musa Barr Virus Quantitative PCR, Plasma    Collection Time: 10/25/24  2:46 PM    Specimen: Arm, Left; Blood   Result Value Ref Range    EBV DNA IU/mL <35 (A) Not Detected IU/mL    EBV DNA Log IU/mL <1.5        Assessment and plan:   63 YO with ALL s/p BMT complicated by cGvHD requiring high dose steroids.  Now with nodular infiltrate on CT concerning for an atypical infection (Fungal, Nocardia, or atypical Mycobacteria).  Needed further invasive testing to attempt to identify a pathogen.  Follow-up CT when last seen showed mixed results- some areas are better, some worse.  Past COVID19 with findings on CT potentially secondary to COVID versus fungal infection.  Now seen back in clinic with CT showing recurrence of GGO.  Taken together with fevers, concerns for infection including atypical infection.  Needs further invasive testing to assist in identifying a pathogen.     Bronchoscopy with BAL.  Will send for immunocompromised panel  Would start emperic antifungal after  the bronchoscopy.  I will follow-up on the BAL results  Repeat chest CT in one month     RTC in one month with repeat chest CT.  Patient advised to contact the clinic with any questions or concerns prior to his next clinic visit            Again, thank you for allowing me to participate in the care of your patient.        Sincerely,        Murtaza Garcia MD

## 2024-10-31 ENCOUNTER — CARE COORDINATION (OUTPATIENT)
Dept: TRANSPLANT | Facility: CLINIC | Age: 64
End: 2024-10-31
Payer: MEDICARE

## 2024-10-31 ENCOUNTER — TELEPHONE (OUTPATIENT)
Dept: PULMONOLOGY | Facility: CLINIC | Age: 64
End: 2024-10-31
Payer: MEDICARE

## 2024-10-31 ENCOUNTER — TELEPHONE (OUTPATIENT)
Dept: ONCOLOGY | Facility: CLINIC | Age: 64
End: 2024-10-31
Payer: MEDICARE

## 2024-10-31 DIAGNOSIS — D89.813 GVHD AS COMPLICATION OF BONE MARROW TRANSPLANT (H): Primary | ICD-10-CM

## 2024-10-31 DIAGNOSIS — T86.09 GVHD AS COMPLICATION OF BONE MARROW TRANSPLANT (H): Primary | ICD-10-CM

## 2024-10-31 RX ORDER — POSACONAZOLE 100 MG/1
300 TABLET, DELAYED RELEASE ORAL EVERY MORNING
Qty: 90 TABLET | Refills: 3 | Status: SHIPPED | OUTPATIENT
Start: 2024-10-31

## 2024-10-31 NOTE — TELEPHONE ENCOUNTER
PA Initiation    Medication: POSACONAZOLE 100 MG PO [facility]  Insurance Company: [email or web address] - Phone [phone] Fax [phone]  Pharmacy Filling the Rx: [facility] - [place], [place] - [address] [address]  Start Date: 10/31/2024        Thank you,  Xi Sanchez Oncology/Transplant Liaison  Phone: [phone]  Fax: [phone]

## 2024-10-31 NOTE — PROGRESS NOTES
Called Nik and wife and left a VM to let him know Dr. Cote sent a prescription for posaconazole and he is not start it until after the bronch is completed.

## 2024-10-31 NOTE — TELEPHONE ENCOUNTER
Prior Authorization Approval    Medication: POSACONAZOLE 100 MG PO [facility]  Authorization Effective Date: 1/1/2024  Authorization Expiration Date: 4/29/2025  Approved Dose/Quantity: 90/30  Reference #: [id number]   Insurance Company: [employer] - Phone [phone] Fax [phone]  Expected CoPay: $ 167  CoPay Card Available:      Financial Assistance Needed:   Which Pharmacy is filling the prescription: [facility] - [place] - [address] [address]  Pharmacy Notified: Yes  Patient Notified: Yes      Thank you,  Xi Sanchez Oncology/Transplant Liaison  Phone: [phone]  Fax: [phone]

## 2024-10-31 NOTE — TELEPHONE ENCOUNTER
Patient confirmed scheduled appointment:  Date: 12/10/24  Time: 2:30PM  Visit type: RPULM BMT  Provider: SHRAVAN  Location: [facility]  Testing/imaging: N/A  Additional notes: Add on ok'd per Dr. Garcia

## 2024-11-01 ENCOUNTER — HOSPITAL ENCOUNTER (OUTPATIENT)
Facility: CLINIC | Age: 64
Discharge: HOME | End: 2024-11-01
Attending: INTERNAL MEDICINE | Admitting: INTERNAL MEDICINE
Payer: MEDICARE

## 2024-11-01 VITALS
SYSTOLIC BLOOD PRESSURE: 108 MMHG | HEART RATE: 79 BPM | OXYGEN SATURATION: 93 % | DIASTOLIC BLOOD PRESSURE: 69 MMHG | RESPIRATION RATE: 16 BRPM

## 2024-11-01 LAB
APPEARANCE FLD: CLEAR
APPEARANCE FLD: CLEAR
BACTERIA SPEC CULT: NORMAL
BACTERIA SPEC CULT: NORMAL
BRONCHOSCOPY: NORMAL
CELL COUNT BODY FLUID SOURCE: NORMAL
CELL COUNT BODY FLUID SOURCE: NORMAL
COLOR FLD: COLORLESS
COLOR FLD: COLORLESS
GRAM STAIN RESULT: NORMAL
LYMPHOCYTES NFR FLD MANUAL: 68 %
LYMPHOCYTES NFR FLD MANUAL: 73 %
Lab: 6800
Lab: 6800
MONOS+MACROS NFR FLD MANUAL: 0 %
MONOS+MACROS NFR FLD MANUAL: 0 %
NEUTS BAND NFR FLD MANUAL: 5 %
NEUTS BAND NFR FLD MANUAL: 5 %
OTHER CELLS FLD MANUAL: 23 %
OTHER CELLS FLD MANUAL: 28 %
PATH REPORT.COMMENTS IMP SPEC: NORMAL
PATH REPORT.FINAL DX SPEC: NORMAL
PATH REPORT.GROSS SPEC: NORMAL
PATH REPORT.MICROSCOPIC SPEC OTHER STN: NORMAL
PATH REPORT.RELEVANT HX SPEC: NORMAL
PATH REV: NORMAL
PATH REV: NORMAL
PERFORMING LABORATORY: NORMAL
PERFORMING LABORATORY: NORMAL
SPECIMEN STATUS: NORMAL
SPECIMEN STATUS: NORMAL
TEST NAME: NORMAL
TEST NAME: NORMAL
WBC # FLD AUTO: 457 /UL
WBC # FLD AUTO: 558 /UL

## 2024-11-01 PROCEDURE — 87205 SMEAR GRAM STAIN: CPT | Performed by: INTERNAL MEDICINE

## 2024-11-01 PROCEDURE — 31624 DX BRONCHOSCOPE/LAVAGE: CPT | Performed by: INTERNAL MEDICINE

## 2024-11-01 PROCEDURE — 99153 MOD SED SAME PHYS/QHP EA: CPT | Performed by: INTERNAL MEDICINE

## 2024-11-01 PROCEDURE — 99152 MOD SED SAME PHYS/QHP 5/>YRS: CPT | Performed by: INTERNAL MEDICINE

## 2024-11-01 PROCEDURE — 88108 CYTOPATH CONCENTRATE TECH: CPT | Mod: 26 | Performed by: PATHOLOGY

## 2024-11-01 PROCEDURE — 87102 FUNGUS ISOLATION CULTURE: CPT | Performed by: INTERNAL MEDICINE

## 2024-11-01 PROCEDURE — 89051 BODY FLUID CELL COUNT: CPT | Performed by: INTERNAL MEDICINE

## 2024-11-01 PROCEDURE — 87015 SPECIMEN INFECT AGNT CONCNTJ: CPT | Performed by: INTERNAL MEDICINE

## 2024-11-01 PROCEDURE — 87305 ASPERGILLUS AG IA: CPT | Performed by: INTERNAL MEDICINE

## 2024-11-01 PROCEDURE — 87252 VIRUS INOCULATION TISSUE: CPT | Performed by: INTERNAL MEDICINE

## 2024-11-01 PROCEDURE — 250N000011 HC RX IP 250 OP 636: Performed by: INTERNAL MEDICINE

## 2024-11-01 PROCEDURE — 87798 DETECT AGENT NOS DNA AMP: CPT | Performed by: INTERNAL MEDICINE

## 2024-11-01 PROCEDURE — 88312 SPECIAL STAINS GROUP 1: CPT | Mod: 26 | Performed by: PATHOLOGY

## 2024-11-01 PROCEDURE — 88108 CYTOPATH CONCENTRATE TECH: CPT | Mod: TC | Performed by: INTERNAL MEDICINE

## 2024-11-01 PROCEDURE — 250N000009 HC RX 250: Performed by: INTERNAL MEDICINE

## 2024-11-01 PROCEDURE — 87070 CULTURE OTHR SPECIMN AEROBIC: CPT | Performed by: INTERNAL MEDICINE

## 2024-11-01 PROCEDURE — G0500 MOD SEDAT ENDO SERVICE >5YRS: HCPCS | Performed by: INTERNAL MEDICINE

## 2024-11-01 RX ORDER — FENTANYL CITRATE 50 UG/ML
INJECTION, SOLUTION INTRAMUSCULAR; INTRAVENOUS PRN
Status: DISCONTINUED | OUTPATIENT
Start: 2024-11-01 | End: 2024-11-01 | Stop reason: HOSPADM

## 2024-11-01 RX ORDER — LIDOCAINE HYDROCHLORIDE 40 MG/ML
INJECTION, SOLUTION RETROBULBAR PRN
Status: DISCONTINUED | OUTPATIENT
Start: 2024-11-01 | End: 2024-11-01 | Stop reason: HOSPADM

## 2024-11-01 RX ORDER — HEPARIN SODIUM (PORCINE) LOCK FLUSH IV SOLN 100 UNIT/ML 100 UNIT/ML
5-10 SOLUTION INTRAVENOUS
Status: DISCONTINUED | OUTPATIENT
Start: 2024-11-01 | End: 2024-11-01 | Stop reason: HOSPADM

## 2024-11-01 RX ORDER — LIDOCAINE HYDROCHLORIDE 10 MG/ML
INJECTION, SOLUTION INFILTRATION; PERINEURAL PRN
Status: DISCONTINUED | OUTPATIENT
Start: 2024-11-01 | End: 2024-11-01 | Stop reason: HOSPADM

## 2024-11-01 RX ORDER — MAGNESIUM HYDROXIDE 1200 MG/15ML
LIQUID ORAL PRN
Status: DISCONTINUED | OUTPATIENT
Start: 2024-11-01 | End: 2024-11-01 | Stop reason: HOSPADM

## 2024-11-01 ASSESSMENT — ACTIVITIES OF DAILY LIVING (ADL)
ADLS_ACUITY_SCORE: 0

## 2024-11-01 NOTE — DISCHARGE INSTRUCTIONS
Discharge Instructions after Bronchoscopy    Activity  You had medicine to relax and for pain. You may feel dizzy or sleepy.  For 24 hours:   Do not drive or use heavy equipment.   Do not make important decisions.   Do not drink any alcohol.    Diet   When you can swallow easily, you may go back to your regular diet, medicines  and light exercise.    Follow-up  We took small fluid samples to study. We will call you with the results in about 10 business days.    Call right away if you have:   Unusual chest pain   Temperature above 100.6  F (37.5  C)   Coughing that does not stop.    If you have severe pain, bleeding, or shortness of breath, go to an emergency room.    If you have questions, call:  Monday to Friday, 8 a.m. to 4:30 p.m.  Adult Pulmonology Clinic: [phone]    After hours:  [facility]: [phone] (Ask for the pulmonary fellow on call)

## 2024-11-03 LAB
ACID FAST STAIN (ARUP): NORMAL
GALACTOMANNAN AG BAL QL: NEGATIVE
GALACTOMANNAN AG BAL QL: NEGATIVE
GALACTOMANNAN AG SPEC IA-ACNC: 0.06
GALACTOMANNAN AG SPEC IA-ACNC: 0.06

## 2024-11-04 LAB
SCANNED LAB RESULT: NORMAL
SCANNED LAB RESULT: NORMAL
TEST NAME: NORMAL
TEST NAME: NORMAL

## 2024-11-06 LAB
BACTERIA BRONCH: NORMAL
BACTERIA BRONCH: NORMAL

## 2024-11-07 DIAGNOSIS — D89.813 GVHD AS COMPLICATION OF BONE MARROW TRANSPLANT (H): ICD-10-CM

## 2024-11-07 DIAGNOSIS — R93.89 ABNORMAL CHEST CT: Primary | ICD-10-CM

## 2024-11-07 DIAGNOSIS — T86.09 GVHD AS COMPLICATION OF BONE MARROW TRANSPLANT (H): ICD-10-CM

## 2024-11-12 ENCOUNTER — PRE VISIT (OUTPATIENT)
Dept: INFECTIOUS DISEASES | Facility: CLINIC | Age: 64
End: 2024-11-12
Payer: MEDICARE

## 2024-11-12 ENCOUNTER — OFFICE VISIT (OUTPATIENT)
Dept: INFECTIOUS DISEASES | Facility: CLINIC | Age: 64
End: 2024-11-12
Attending: INTERNAL MEDICINE
Payer: MEDICARE

## 2024-11-12 VITALS
DIASTOLIC BLOOD PRESSURE: 79 MMHG | BODY MASS INDEX: 33.8 KG/M2 | HEIGHT: 73 IN | OXYGEN SATURATION: 98 % | TEMPERATURE: 98 F | SYSTOLIC BLOOD PRESSURE: 134 MMHG | HEART RATE: 63 BPM | WEIGHT: 255 LBS

## 2024-11-12 DIAGNOSIS — D89.813 GVHD AS COMPLICATION OF BONE MARROW TRANSPLANT (H): ICD-10-CM

## 2024-11-12 DIAGNOSIS — R50.9 FEVER, UNSPECIFIED FEVER CAUSE: ICD-10-CM

## 2024-11-12 DIAGNOSIS — T86.09 GVHD AS COMPLICATION OF BONE MARROW TRANSPLANT (H): ICD-10-CM

## 2024-11-12 DIAGNOSIS — C91.01 ACUTE LYMPHOBLASTIC LEUKEMIA (ALL) IN REMISSION (H): ICD-10-CM

## 2024-11-12 PROCEDURE — G0463 HOSPITAL OUTPT CLINIC VISIT: HCPCS | Performed by: INTERNAL MEDICINE

## 2024-11-12 ASSESSMENT — PAIN SCALES - GENERAL: PAINLEVEL_OUTOF10: NO PAIN (0)

## 2024-11-12 NOTE — Clinical Note
Chris Lam and Murtaza-  I saw Nik in clinic today. He is now afebrile and has been fever less than 3 days after starting the posaconazole. From my perspective, his imaging is not consistent with fungal pneumonia, BAL cultures are negative, and his fever has resolved. Thus, I would favor discontinuing his posaconazole. However, will defer to you you regarding this.   Chris, I also think we should have him on PJP PPx, since he's getting a relatively high dose of steroids. I'd favor TMP/SMX, as this would also cover encapsulated organisms. Let me know if you have any issues with this.   Thanks,   Alba

## 2024-11-12 NOTE — PROGRESS NOTES
Elevated temp starting on 10/14. Was 101.8 on 10/24. Had fatigue, muscle aches, and headaches. His temp has now come down to 97F. Feels well except for fatigue and SOB with exertion since the bronchoscopy. No cough, sore throat, nausea, vomiting, diarrhea. No recent sick contacts. Bronchoscopy 11/01. Since 11/5 his temp has been normal. He completed a 7d course of doxycycline (10/18 started). Has been on posaconazole since 11/2.     -[place] 2024  -Cabin near [facility]-- demo'd the garage and there was mold found, but he stayed outside of the garage during this time and wore N95  -Hasn't been in water  -No travel to  [place]  -[place], [place], [place] in 2019  -No pets at home. Got scratches from neighbor's cat this past summer and daughter's dog.  -No time in  or prisons.

## 2024-11-12 NOTE — PATIENT INSTRUCTIONS
It was great to see you today. We discussed the following:     Let me know if your fevers come back. If so, we will do some extra tests.   I will work with Dr. Cote and Dr. Garcia to see if we still need you to take posaconazole. We will get back to you about this.   Have your posaconazole level and your G6PD level checked for planning of treatment   I will discuss with Dr. Cote about the penicillin therapy   Vaccines, you will need COVID-19 vaccine at 6 months  If you are doing outdoor activities, please use a KN95 mask. Same with going to the schoolchildren.     Follow-up based on the plan for the posaconazole

## 2024-11-12 NOTE — LETTER
11/12/2024       RE: Nik Nava  [address]  [place] [address]     Dear Colleague,    Thank you for referring your patient, Nik Nava, to the [facility] [place] at [facility]. Please see a copy of my visit note below.      [facility] [place]  [address] [facility] [address]  Phone: [phone]  Fax: [phone]    Patient:  Nik Nava, Date of birth 1960  Date of Visit:  11/12/2024  Referring Provider Alba Markham MD  Reason for visit: fever of unknown origin    Recommendations   As fevers have now resolved, will not do extensive work-up. Will obtain the following labs:   CBC with differential, posaconazole level, G6PD, PJP PCR (added to bronch)   Will reach out to Kaci Cote and Radha regarding ongoing posaconazole use. From ID perspective, his fevers resolved within 3 days of starting this, appearance on CT chest not consistent with fungal pneumonia, and he has not grown fungus from his BAL. Thus, I do not feel that he needs posaconazole.   Given patient is on long-term 15mg prednisone, I think it is prudent to do PJP PPx. I would use TMP/SMX, as this also has coverage of encapsulated organisms.   He has prior history of rash with bactrim, but did not have any evidence of hives, throat swelling, etc. Thus, I think it is safe to give a trial.  Also could consider dapsone. Will check G6PD.  Continue penicillin for pneumococcal prophylaxis, given ongoing GVHD treatment and splenic atrophy seen on CT  He is up to date on vaccines. Due for Covid booster in 6 mo.    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 He is is currently on 15 mg/day prednisone and dex swish and swallow. We are seeing him in clinic today for unexplained fevers and pulmonary GGOs.     # Fevers, resolved  Regarding his pulmonary infiltrates, CT chest (10/21/2024) with patchy peribronchovascular GGOs with apical predominance. BAL (11/1/2024) without growth to date and negative BAL galactomannan (0.06). Blood cutlure (10/15/21) NGTD. RVP/influenza/SARS CoV-2 (10/15/24), Lyme antibody (10/18/24), CrAG (10/18/24) all negative. CMV VL negative (10/25) and EBV VL <35 (10/25/24). Nocardia (11/1/24) negative. Fungal/yeast culture (11/1/24) negative. Leukocytosis with elevated neutrophils 10/18/24 and 10/25/24. He was empirically started on doxycycline 10/18-24. He was started on posaconazole by Pulmonology on 11/2.     Patient's fever resolved on 11/5, continues to have fatigue, but body aches and chills have resolved. Unclear etiology of fevers, but consider PJP given history of long term steroid use, however no growth on BAL and galactomannan negative. Also consider viral vs bacterial vs other fungal infection vs non-infectious (ie. rheumatologic) vs medication side-effect. Discussed with patient that if this were fungal infection, it is very unlikely it would have responded so quickly to posaconzaole. Also, appearance on CT chest and lack of growth on BAL argue against this. Discussed that since fevers have now resolved, will discuss with BMT team about discontinuing posaconazole. Reviewed medications, low concern that medications are contributing. Did add on PJP PCR to BAL, since he has not been on PJP PPx.     Transplant Checklist  - QTc interval:  - Pneumocystis prophylaxis: None. Previously on pentamidine, but stopped in 6/2023. Will discuss restarting this with Dr. Cote.   - Bacterial prophylaxis: Penicillin  - Fungal prophylaxis: Posaconazole. Previously required micafungin.    - Serostatus & viral prophylaxis: CMV D+/R+, HSV ?, EBV + Acyclovir. Developed recurrent oral HSV after stopping acyclovir.   - Immunization status:  Up-to-date on seasonal COVID-19 and influenza, Shingrix, RSV (8/2024), PCV-20 (5/2024), HBV, and Tetanus (5/2024).   - Gamma globulin status:   Recent Labs   Lab Test 10/25/24  1303   *     - Antibiotic allergies: Rash with bactrim (no respiratory issues or hives)     Prior infectious diseases issues   Epidural abscess (3/2023) and discitis of L4-S1: Did have Staph epi bacteremia around that time (3/30/23). Bone biopsy from 3/31/23 without culture growth. No cellular material present. Treated with 8 weeks IV CTX (end 5/22/2023). Re=admitted 8/2023 with increasing back pain and progression of discitis/osteomyelitis. Treated with vancomycin and ertapenem for 8 weeks. Transitioned to PO clindamycin-->doxycycline and Augmentin for an additional 4 weeks. Repeat spinal imaging on 11/29 with significant improvement.    COVID-19 x2: Diagnosed last in 1/2023. Treated with Paxlovid  CMV Viremia: Diagnosed in 4/2022. Treated with Valcyte 900 mg BID.   Oral Herpes : Initialy diagnosed in 12/2023. Treated with Valtrex with subsequent recurrence. Has been on acyclvoir PPx since 1/20/2024 without recurrence.   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 Course c/b recurrent oral HSV as well as skin, ocular, oral, and liver cGVHD. He is is currently on 15 mg/day prednisone and dex swish and swallow. We are seeing him in clinic today for unexplained fevers and pulmonary GGOs.     Elevated temp starting on 10/14. Was 101.8 on 10/24. Had fatigue, muscle aches, and headaches. Bronchoscopy 11/01. Since 11/5 his temp has been normal.  Feels well except for fatigue and SOB with exertion since the bronchoscopy.  No cough, sore throat, nausea, vomiting, diarrhea. No recent sick contacts. He completed a 7d course of doxycycline (10/18 started). Has been on posaconazole since 11/2.     Fever history is as follows:   10/24/24: Developed fevers to 101 with early morning chills and shivering. No other localizing symptoms.   10/20/24: Started doxyclcine for possible tick-borne illness, given extensive exposure history  10/21/24: CT chest with peribronchovasciular GGOs in apical and superior segments of bilateral lower lobes,   10/25/2024: Seen by Dr. Cote and still noted fevers. CT chest with bilateral GGOs.   11/1/2024: BAL completed with NGTD.     Of note, he was on tacrolimus for his GVHD from 8/2021-9/2022. Switched to sirolimus from 9-10/2022. Has not been on either since. For his prednisone, he has been on steroids since 3/2022. Has been on 15 mg/day since 9/9/2024 (was on 10 previously).     Prior Antibiotics  Doxycycline 10/18-24  Posaconazole started 11/2/24    Exposure History   -[place] 2024  -Cabin near [facility]-- demo'd the garage and there was mold found, but he stayed outside of the garage during this time and wore N95  -Hasn't been in water  -No travel to  [place]  -[place], [place], [place] in 2019  -No pets at home. Got scratches from neighbor's cat this past summer and daughter's dog.  -No time in  or prisons.     Vaccines     Immunization History   Administered Date(s) Administered     COVID-19 12+ (MODERNA) 09/24/2024     COVID-19 12+ (Pfizer) 11/14/2023     COVID-19 Bivalent 12+ (Pfizer) 11/16/2022     COVID-19 MONOVALENT 12+ (Pfizer) 11/18/2021, 12/14/2021     DTAP,IPV,HIB,HEPB (VAXELIS) 05/09/2024     DTaP/HepB/IPV 11/14/2023     HIB (PRP-T) 11/14/2023     Influenza Vaccine 18-64 (Flublok) 10/12/2021     Influenza Vaccine 65+ (FLUAD) 09/08/2023     Pneumo Conj 13-V (2010&after) 11/14/2023     Pneumococcal 20 valent Conjugate (Prevnar 20) 05/09/2024     TDAP (Adacel,Boostrix)  10/27/2016     Zoster recombinant adjuvanted (SHINGRIX) 11/14/2023, 01/09/2024       Medications     Current Outpatient Medications   Medication Sig Dispense Refill     acyclovir (ZOVIRAX) 400 MG tablet Take 2 tablets (800 mg) by mouth every 12 hours 360 tablet 3     amLODIPine (NORVASC) 5 MG tablet Take 1 tablet (5 mg) by mouth daily 90 tablet 3     Carboxymethylcell-Glycerin PF (REFRESH RELIEVA PF) 0.5-1 % SOLN Apply 1 drop to eye 4 times daily Preservative free. Either PF multidose bottle or PF vials 30 each 11     dexAMETHasone alcohol-free (DECADRON) 0.1 MG/ML solution Swish and spit 10 mLs (1 mg) in mouth 2 times daily. 500 mL 2     doxycycline hyclate (VIBRAMYCIN) 100 MG capsule Take 1 capsule (100 mg) by mouth 2 times daily. (Patient not taking: Reported on 10/29/2024) 14 capsule 0     escitalopram (LEXAPRO) 10 MG tablet Take 1 tablet (10 mg) by mouth daily 30 tablet 3     fluorometholone (FML LIQUIFILM) 0.1 % ophthalmic suspension Place 1 drop Into the left eye 3 times daily. 5 mL 1     levothyroxine (SYNTHROID/LEVOTHROID) 112 MCG tablet Take 1 tablet (112 mcg) by mouth daily 90 tablet 4     LORazepam (ATIVAN) 1 MG tablet Take 1 mg by mouth every 6 hours as needed for anxiety. PRN       metroNIDAZOLE (METROCREAM) 0.75 % external cream Apply topically 2 times daily Apply to the nose. 45 g 3     nicotine polacrilex (NICORETTE) 4 MG gum Place 4 mg inside cheek daily.       Nutritional Supplements (ENSURE COMPLETE SHAKE) LIQD Take 11 mLs by mouth every morning       omeprazole (PRILOSEC) 40 MG DR capsule Take 1 capsule (40 mg) by mouth daily 30 capsule 3     penicillin V (VEETID) 250 MG tablet Take 1 Tablet (250 mg) by mouth two times daily. 60 tablet 1     posaconazole (NOXAFIL) 100 MG DR tablet Take 3 tablets (300 mg) by mouth every morning. 90 tablet 3     predniSONE (DELTASONE) 10 MG tablet Take 1 tablet (10 mg) by mouth daily 90 tablet 2     predniSONE (DELTASONE) 5 MG tablet Take 1 tablet (5 mg) by mouth  "daily. Currently taking 15 mg daily ( on taper) 90 tablet 1     PREVIDENT 5000 BOOSTER PLUS 1.1 % PSTE dental paste  (Patient not taking: Reported on 10/29/2024)       rosuvastatin (CRESTOR) 5 MG tablet Take 1 tablet (5 mg) by mouth daily. 30 tablet 3     sennosides (SENOKOT) 8.6 MG tablet Take 1 tablet by mouth 3 times daily as needed for constipation 90 tablet 0     sodium chloride 0.9 % neb solution 3 mLs by Other route 2 times daily 300 mL 11     tacrolimus (PROTOPIC) 0.1 % external ointment Apply topically 2 times daily 30 g 1     No current facility-administered medications for this visit.          Physical Exam     Physical Exam    Vital signs:  /79   Pulse 63   Temp 98  F (36.7  C) (Oral)   Ht 1.854 m (6' 1\")   Wt 115.7 kg (255 lb)   SpO2 98%   BMI 33.64 kg/m      GENERAL: alert and no distress  RESP: lungs clear to auscultation - no rales, rhonchi or wheezes  CV: regular rate and rhythm, normal S1 S2, no S3 or S4, no murmur, click or rub, no peripheral edema  ABDOMEN: soft, nontender, no hepatosplenomegaly, no masses and bowel sounds normal  MS: no gross musculoskeletal defects noted, lower extremity edema bilaterally to mid-shin    Data     Data  Laboratory data and imaging listed below was reviewed prior to this encounter.   CT chest (10/21/2024) with patchy peribronchovascular GGOs with apical predominance. BAL (11/1/2024) without growth to date and negative BAL galactomannan (0.06). Blood cutlure (10/15/21) NGTD. RVP/influenza/SARS CoV-2 (10/15/24), Lyme antibody (10/18/24), CrAG (10/18/24) all negative. CMV VL negative (10/25) and EBV VL <35 (10/25/24). Nocardia (11/1/24) negative. Fungal/yeast culture (11/1/24) negative. Leukocytosis with elevated neutrophils 10/18/24 and 10/25/24.          ----- Service Performed and Documented by Resident or Fellow ------           Again, thank you for allowing me to participate in the care of your patient.      Sincerely,    Alba Markham MD    "

## 2024-11-12 NOTE — NURSING NOTE
"Chief Complaint   Patient presents with    Consult     New Patient     /79   Pulse 63   Temp 98  F (36.7  C) (Oral)   Ht 1.854 m (6' 1\")   Wt 115.7 kg (255 lb)   SpO2 98%   BMI 33.64 kg/m    Briseida Kent on 11/12/2024 at 2:30 PM    "

## 2024-11-12 NOTE — PROGRESS NOTES
[facility] [address] [address]  Phone: [phone]  Fax: [phone]    Patient:  Nik Nava, Date of birth 1960  Date of Visit:  11/12/2024  Referring Provider Alba Markham MD  Reason for visit: fever of unknown origin    Recommendations   As fevers have now resolved, will not do extensive work-up. Will obtain the following labs:   CBC with differential, posaconazole level, G6PD, PJP PCR (added to bronch)   Will reach out to Drs Kera and Radha regarding ongoing posaconazole use. From ID perspective, his fevers resolved within 3 days of starting this, appearance on CT chest not consistent with fungal pneumonia, and he has not grown fungus from his BAL. Thus, I do not feel that he needs posaconazole.   Given patient is on long-term 15mg prednisone, I think it is prudent to do PJP PPx. I would use TMP/SMX, as this also has coverage of encapsulated organisms.   He has prior history of rash with bactrim, but did not have any evidence of hives, throat swelling, etc. Thus, I think it is safe to give a trial.  Also could consider dapsone. Will check G6PD.  Continue penicillin for pneumococcal prophylaxis, given ongoing GVHD treatment and splenic atrophy seen on CT  He is up to date on vaccines. Due for Covid booster in 6 mo.    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 He is is currently on 15 mg/day prednisone and dex swish and swallow. We are seeing him in clinic today for unexplained fevers and pulmonary GGOs.     # Fevers, resolved  Regarding his pulmonary infiltrates, CT chest (10/21/2024) with patchy  peribronchovascular GGOs with apical predominance. BAL (11/1/2024) without growth to date and negative BAL galactomannan (0.06). Blood cutlure (10/15/21) NGTD. RVP/influenza/SARS CoV-2 (10/15/24), Lyme antibody (10/18/24), CrAG (10/18/24) all negative. CMV VL negative (10/25) and EBV VL <35 (10/25/24). Nocardia (11/1/24) negative. Fungal/yeast culture (11/1/24) negative. Leukocytosis with elevated neutrophils 10/18/24 and 10/25/24. He was empirically started on doxycycline 10/18-24. He was started on posaconazole by Pulmonology on 11/2.     Patient's fever resolved on 11/5, continues to have fatigue, but body aches and chills have resolved. Unclear etiology of fevers, but consider PJP given history of long term steroid use, however no growth on BAL and galactomannan negative. Also consider viral vs bacterial vs other fungal infection vs non-infectious (ie. rheumatologic) vs medication side-effect. Discussed with patient that if this were fungal infection, it is very unlikely it would have responded so quickly to posaconzaole. Also, appearance on CT chest and lack of growth on BAL argue against this. Discussed that since fevers have now resolved, will discuss with BMT team about discontinuing posaconazole. Reviewed medications, low concern that medications are contributing. Did add on PJP PCR to BAL, since he has not been on PJP PPx.     Transplant Checklist  - QTc interval:  - Pneumocystis prophylaxis: None. Previously on pentamidine, but stopped in 6/2023. Will discuss restarting this with Dr. Cote.   - Bacterial prophylaxis: Penicillin  - Fungal prophylaxis: Posaconazole. Previously required micafungin.   - Serostatus & viral prophylaxis: CMV D+/R+, HSV ?, EBV + Acyclovir. Developed recurrent oral HSV after stopping acyclovir.   - Immunization status:  Up-to-date on seasonal COVID-19 and influenza, Shingrix, RSV (8/2024), PCV-20 (5/2024), HBV, and Tetanus (5/2024).   - Gamma globulin status:   Recent Labs   Lab  Test 10/25/24  1303   *     - Antibiotic allergies: Rash with bactrim (no respiratory issues or hives)     Prior infectious diseases issues   Epidural abscess (3/2023) and discitis of L4-S1: Did have Staph epi bacteremia around that time (3/30/23). Bone biopsy from 3/31/23 without culture growth. No cellular material present. Treated with 8 weeks IV CTX (end 5/22/2023). Re=admitted 8/2023 with increasing back pain and progression of discitis/osteomyelitis. Treated with vancomycin and ertapenem for 8 weeks. Transitioned to PO clindamycin-->doxycycline and Augmentin for an additional 4 weeks. Repeat spinal imaging on 11/29 with significant improvement.    COVID-19 x2: Diagnosed last in 1/2023. Treated with Paxlovid  CMV Viremia: Diagnosed in 4/2022. Treated with Valcyte 900 mg BID.   Oral Herpes : Initialy diagnosed in 12/2023. Treated with Valtrex with subsequent recurrence. Has been on acyclvoir PPx since 1/20/2024 without recurrence.   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 Course c/b recurrent oral HSV as well as skin, ocular, oral, and liver cGVHD. He is is currently on 15 mg/day prednisone and dex swish and swallow. We are seeing him in clinic today for unexplained fevers and pulmonary GGOs.     Elevated temp starting on 10/14. Was 101.8 on 10/24. Had fatigue, muscle aches, and headaches. Bronchoscopy 11/01. Since 11/5 his temp has been normal.  Feels well except for fatigue and SOB with exertion since the bronchoscopy. No cough, sore throat, nausea, vomiting, diarrhea. No recent sick contacts. He completed a 7d course of doxycycline (10/18 started). Has been on posaconazole since 11/2.     Fever history is as follows:   10/24/24: Developed fevers to 101 with early morning chills and shivering. No other localizing symptoms.    10/20/24: Started doxyclcine for possible tick-borne illness, given extensive exposure history  10/21/24: CT chest with peribronchovasciular GGOs in apical and superior segments of bilateral lower lobes,   10/25/2024: Seen by Dr. Cote and still noted fevers. CT chest with bilateral GGOs.   11/1/2024: BAL completed with NGTD.     Of note, he was on tacrolimus for his GVHD from 8/2021-9/2022. Switched to sirolimus from 9-10/2022. Has not been on either since. For his prednisone, he has been on steroids since 3/2022. Has been on 15 mg/day since 9/9/2024 (was on 10 previously).     Prior Antibiotics  Doxycycline 10/18-24  Posaconazole started 11/2/24    Exposure History   -[place] 2024  -[place]abin near [facility]-- demo'd the garage and there was mold found, but he stayed outside of the garage during this time and wore N95  -Hasn't been in water  -No travel to  [place]  -[place], [place], [place] in 2019  -No pets at home. Got scratches from neighbor's cat this past summer and daughter's dog.  -No time in  or prisons.     Vaccines     Immunization History   Administered Date(s) Administered    COVID-19 12+ (MODERNA) 09/24/2024    COVID-19 12+ (Pfizer) 11/14/2023    COVID-19 Bivalent 12+ (Pfizer) 11/16/2022    COVID-19 MONOVALENT 12+ (Pfizer) 11/18/2021, 12/14/2021    DTAP,IPV,HIB,HEPB (VAXELIS) 05/09/2024    DTaP/HepB/IPV 11/14/2023    HIB (PRP-T) 11/14/2023    Influenza Vaccine 18-64 (Flublok) 10/12/2021    Influenza Vaccine 65+ (FLUAD) 09/08/2023    Pneumo Conj 13-V (2010&after) 11/14/2023    Pneumococcal 20 valent Conjugate (Prevnar 20) 05/09/2024    TDAP (Adacel,Boostrix) 10/27/2016    Zoster recombinant adjuvanted (SHINGRIX) 11/14/2023, 01/09/2024       Medications     Current Outpatient Medications   Medication Sig Dispense Refill    acyclovir (ZOVIRAX) 400 MG tablet Take 2 tablets (800 mg) by mouth every 12 hours 360 tablet 3    amLODIPine (NORVASC) 5 MG tablet Take 1 tablet (5 mg) by  mouth daily 90 tablet 3    Carboxymethylcell-Glycerin PF (REFRESH RELIEVA PF) 0.5-1 % SOLN Apply 1 drop to eye 4 times daily Preservative free. Either PF multidose bottle or PF vials 30 each 11    dexAMETHasone alcohol-free (DECADRON) 0.1 MG/ML solution Swish and spit 10 mLs (1 mg) in mouth 2 times daily. 500 mL 2    doxycycline hyclate (VIBRAMYCIN) 100 MG capsule Take 1 capsule (100 mg) by mouth 2 times daily. (Patient not taking: Reported on 10/29/2024) 14 capsule 0    escitalopram (LEXAPRO) 10 MG tablet Take 1 tablet (10 mg) by mouth daily 30 tablet 3    fluorometholone (FML LIQUIFILM) 0.1 % ophthalmic suspension Place 1 drop Into the left eye 3 times daily. 5 mL 1    levothyroxine (SYNTHROID/LEVOTHROID) 112 MCG tablet Take 1 tablet (112 mcg) by mouth daily 90 tablet 4    LORazepam (ATIVAN) 1 MG tablet Take 1 mg by mouth every 6 hours as needed for anxiety. PRN      metroNIDAZOLE (METROCREAM) 0.75 % external cream Apply topically 2 times daily Apply to the nose. 45 g 3    nicotine polacrilex (NICORETTE) 4 MG gum Place 4 mg inside cheek daily.      Nutritional Supplements (ENSURE COMPLETE SHAKE) LIQD Take 11 mLs by mouth every morning      omeprazole (PRILOSEC) 40 MG DR capsule Take 1 capsule (40 mg) by mouth daily 30 capsule 3    penicillin V (VEETID) 250 MG tablet Take 1 Tablet (250 mg) by mouth two times daily. 60 tablet 1    posaconazole (NOXAFIL) 100 MG DR tablet Take 3 tablets (300 mg) by mouth every morning. 90 tablet 3    predniSONE (DELTASONE) 10 MG tablet Take 1 tablet (10 mg) by mouth daily 90 tablet 2    predniSONE (DELTASONE) 5 MG tablet Take 1 tablet (5 mg) by mouth daily. Currently taking 15 mg daily ( on taper) 90 tablet 1    PREVIDENT 5000 BOOSTER PLUS 1.1 % PSTE dental paste  (Patient not taking: Reported on 10/29/2024)      rosuvastatin (CRESTOR) 5 MG tablet Take 1 tablet (5 mg) by mouth daily. 30 tablet 3    sennosides (SENOKOT) 8.6 MG tablet Take 1 tablet by mouth 3 times daily as needed for  "constipation 90 tablet 0    sodium chloride 0.9 % neb solution 3 mLs by Other route 2 times daily 300 mL 11    tacrolimus (PROTOPIC) 0.1 % external ointment Apply topically 2 times daily 30 g 1     No current facility-administered medications for this visit.          Physical Exam     Physical Exam     Vital signs:  /79   Pulse 63   Temp 98  F (36.7  C) (Oral)   Ht 1.854 m (6' 1\")   Wt 115.7 kg (255 lb)   SpO2 98%   BMI 33.64 kg/m      GENERAL: alert and no distress  RESP: lungs clear to auscultation - no rales, rhonchi or wheezes  CV: regular rate and rhythm, normal S1 S2, no S3 or S4, no murmur, click or rub, no peripheral edema  ABDOMEN: soft, nontender, no hepatosplenomegaly, no masses and bowel sounds normal  MS: no gross musculoskeletal defects noted, lower extremity edema bilaterally to mid-shin    Data     Data   Laboratory data and imaging listed below was reviewed prior to this encounter.   CT chest (10/21/2024) with patchy peribronchovascular GGOs with apical predominance. BAL (11/1/2024) without growth to date and negative BAL galactomannan (0.06). Blood cutlure (10/15/21) NGTD. RVP/influenza/SARS CoV-2 (10/15/24), Lyme antibody (10/18/24), CrAG (10/18/24) all negative. CMV VL negative (10/25) and EBV VL <35 (10/25/24). Nocardia (11/1/24) negative. Fungal/yeast culture (11/1/24) negative. Leukocytosis with elevated neutrophils 10/18/24 and 10/25/24.          ----- Service Performed and Documented by Resident or Fellow ------         "

## 2024-11-13 ENCOUNTER — LAB (OUTPATIENT)
Dept: LAB | Facility: CLINIC | Age: 64
End: 2024-11-13
Payer: MEDICARE

## 2024-11-13 DIAGNOSIS — D89.813 GVHD AS COMPLICATION OF BONE MARROW TRANSPLANT (H): ICD-10-CM

## 2024-11-13 DIAGNOSIS — R50.9 FEVER, UNSPECIFIED FEVER CAUSE: ICD-10-CM

## 2024-11-13 DIAGNOSIS — T86.09 GVHD AS COMPLICATION OF BONE MARROW TRANSPLANT (H): ICD-10-CM

## 2024-11-13 DIAGNOSIS — C91.01 ACUTE LYMPHOBLASTIC LEUKEMIA (ALL) IN REMISSION (H): ICD-10-CM

## 2024-11-13 LAB
BASOPHILS # BLD AUTO: 0 10E3/UL (ref 0–0.2)
BASOPHILS NFR BLD AUTO: 0 %
EOSINOPHIL # BLD AUTO: 0.1 10E3/UL (ref 0–0.7)
EOSINOPHIL NFR BLD AUTO: 1 %
ERYTHROCYTE [DISTWIDTH] IN BLOOD BY AUTOMATED COUNT: 15.8 % (ref 10–15)
HCT VFR BLD AUTO: 36.2 % (ref 40–53)
HGB BLD-MCNC: 12.5 G/DL (ref 13.3–17.7)
IMM GRANULOCYTES # BLD: 0.1 10E3/UL
IMM GRANULOCYTES NFR BLD: 1 %
LYMPHOCYTES # BLD AUTO: 2.6 10E3/UL (ref 0.8–5.3)
LYMPHOCYTES NFR BLD AUTO: 22 %
MCH RBC QN AUTO: 32.7 PG (ref 26.5–33)
MCHC RBC AUTO-ENTMCNC: 34.5 G/DL (ref 31.5–36.5)
MCV RBC AUTO: 95 FL (ref 78–100)
MONOCYTES # BLD AUTO: 1.1 10E3/UL (ref 0–1.3)
MONOCYTES NFR BLD AUTO: 9 %
NEUTROPHILS # BLD AUTO: 7.9 10E3/UL (ref 1.6–8.3)
NEUTROPHILS NFR BLD AUTO: 67 %
NRBC # BLD AUTO: 0 10E3/UL
NRBC BLD AUTO-RTO: 0 /100
PLATELET # BLD AUTO: 299 10E3/UL (ref 150–450)
RBC # BLD AUTO: 3.82 10E6/UL (ref 4.4–5.9)
WBC # BLD AUTO: 11.8 10E3/UL (ref 4–11)

## 2024-11-14 ENCOUNTER — TELEPHONE (OUTPATIENT)
Dept: INFECTIOUS DISEASES | Facility: CLINIC | Age: 64
End: 2024-11-14
Payer: MEDICARE

## 2024-11-14 LAB — POSACONAZOLE SERPL-MCNC: 2.8 UG/ML (ref 0.7–5)

## 2024-11-14 NOTE — TELEPHONE ENCOUNTER
EP called 11/14 to sched a 1 month follow up via video in Dec 2024 per checkout notes from 11/12. Gerald said that he'll be in [place] for this appt.

## 2024-11-15 LAB — G6PD RBC-CCNT: 13.7 U/G HB

## 2024-11-16 ENCOUNTER — TELEPHONE (OUTPATIENT)
Dept: INFECTIOUS DISEASES | Facility: CLINIC | Age: 64
End: 2024-11-16
Payer: MEDICARE

## 2024-11-16 ENCOUNTER — HEALTH MAINTENANCE LETTER (OUTPATIENT)
Age: 64
End: 2024-11-16

## 2024-11-16 DIAGNOSIS — C91.01 ACUTE LYMPHOBLASTIC LEUKEMIA (ALL) IN REMISSION (H): ICD-10-CM

## 2024-11-16 DIAGNOSIS — T86.09 GVHD AS COMPLICATION OF BONE MARROW TRANSPLANT (H): ICD-10-CM

## 2024-11-16 DIAGNOSIS — D89.813 GVHD AS COMPLICATION OF BONE MARROW TRANSPLANT (H): ICD-10-CM

## 2024-11-16 DIAGNOSIS — B59 PNEUMONIA OF BOTH UPPER LOBES DUE TO PNEUMOCYSTIS JIROVECII (H): Primary | ICD-10-CM

## 2024-11-16 DIAGNOSIS — R09.81 NASAL CONGESTION: ICD-10-CM

## 2024-11-16 DIAGNOSIS — R50.9 FEVER, UNSPECIFIED FEVER CAUSE: ICD-10-CM

## 2024-11-16 LAB — P JIROVECII DNA SPEC QL NAA+PROBE: DETECTED

## 2024-11-17 NOTE — TELEPHONE ENCOUNTER
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 This is positive, which could indicate either colonization or invasive infection. While he does feel currently relatively well, his recent pulmonary infiltrates and unexplained fevers could be consistent with a mild disease. Thus, I think we should treat this. We will do 15 mg/kg/day. At 115 kg, this is equivalent to 1,725 mg trimethoprim component, or 11 tablets. Thus, we will do 4 tablets in the AM and PM and 3 tablets in the afternoon. He will have his kidney function checked on Friday and at least once weekly thereafter.     Of note, he did have a rash that occurred around the time he took Bactrim in the past. However, this was mild and was not related to anaphylaxis symptoms. Thus, I am OK with giving a trial of Bactrim. If he develops a rash, we can switch to atovaquone for treatment.      Given his diagnosis of PJP, we will stop his posaconazole.     In addition, he does have some URI symptoms after visiting with his grandson this weekend. I will place for RVP and COVID-19 testing for him today.     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 He has gotten HIB and is up-to-date on his pneumococcal vaccines. He should get MenACYW and Meningococcal B vaccines. I will send him a [employer] message about this.     Alba Markham MD  [phone]

## 2024-11-18 ENCOUNTER — LAB (OUTPATIENT)
Dept: LAB | Facility: CLINIC | Age: 64
End: 2024-11-18
Payer: MEDICARE

## 2024-11-18 ENCOUNTER — TELEPHONE (OUTPATIENT)
Dept: INFECTIOUS DISEASES | Facility: CLINIC | Age: 64
End: 2024-11-18

## 2024-11-18 DIAGNOSIS — B59 PNEUMONIA OF BOTH UPPER LOBES DUE TO PNEUMOCYSTIS JIROVECII (H): ICD-10-CM

## 2024-11-18 DIAGNOSIS — D89.813 GVHD AS COMPLICATION OF BONE MARROW TRANSPLANT (H): ICD-10-CM

## 2024-11-18 DIAGNOSIS — R09.81 NASAL CONGESTION: ICD-10-CM

## 2024-11-18 DIAGNOSIS — R50.9 FEVER, UNSPECIFIED FEVER CAUSE: ICD-10-CM

## 2024-11-18 DIAGNOSIS — T86.09 GVHD AS COMPLICATION OF BONE MARROW TRANSPLANT (H): ICD-10-CM

## 2024-11-18 LAB
ALBUMIN SERPL BCG-MCNC: 4 G/DL (ref 3.5–5.2)
ALP SERPL-CCNC: 108 U/L (ref 40–150)
ALT SERPL W P-5'-P-CCNC: 36 U/L (ref 0–70)
ANION GAP SERPL CALCULATED.3IONS-SCNC: 12 MMOL/L (ref 7–15)
AST SERPL W P-5'-P-CCNC: 37 U/L (ref 0–45)
BASOPHILS # BLD AUTO: 0 10E3/UL (ref 0–0.2)
BASOPHILS NFR BLD AUTO: 0 %
BILIRUB SERPL-MCNC: 0.6 MG/DL
BUN SERPL-MCNC: 30.5 MG/DL (ref 8–23)
C PNEUM DNA SPEC QL NAA+PROBE: NOT DETECTED
CALCIUM SERPL-MCNC: 9.8 MG/DL (ref 8.8–10.4)
CHLORIDE SERPL-SCNC: 103 MMOL/L (ref 98–107)
CREAT SERPL-MCNC: 1.38 MG/DL (ref 0.67–1.17)
EGFRCR SERPLBLD CKD-EPI 2021: 56 ML/MIN/1.73M2
EOSINOPHIL # BLD AUTO: 0 10E3/UL (ref 0–0.7)
EOSINOPHIL NFR BLD AUTO: 0 %
ERYTHROCYTE [DISTWIDTH] IN BLOOD BY AUTOMATED COUNT: 16.8 % (ref 10–15)
FLUAV H1 2009 PAND RNA SPEC QL NAA+PROBE: NOT DETECTED
FLUAV H1 RNA SPEC QL NAA+PROBE: NOT DETECTED
FLUAV H3 RNA SPEC QL NAA+PROBE: NOT DETECTED
FLUAV RNA SPEC QL NAA+PROBE: NEGATIVE
FLUAV RNA SPEC QL NAA+PROBE: NOT DETECTED
FLUBV RNA RESP QL NAA+PROBE: NEGATIVE
FLUBV RNA SPEC QL NAA+PROBE: NOT DETECTED
GLUCOSE SERPL-MCNC: 112 MG/DL (ref 70–99)
HADV DNA SPEC QL NAA+PROBE: NOT DETECTED
HCO3 SERPL-SCNC: 24 MMOL/L (ref 22–29)
HCOV PNL SPEC NAA+PROBE: NOT DETECTED
HCT VFR BLD AUTO: 35.9 % (ref 40–53)
HGB BLD-MCNC: 12.1 G/DL (ref 13.3–17.7)
HMPV RNA SPEC QL NAA+PROBE: NOT DETECTED
HPIV1 RNA SPEC QL NAA+PROBE: NOT DETECTED
HPIV2 RNA SPEC QL NAA+PROBE: NOT DETECTED
HPIV3 RNA SPEC QL NAA+PROBE: NOT DETECTED
HPIV4 RNA SPEC QL NAA+PROBE: NOT DETECTED
IMM GRANULOCYTES # BLD: 0.1 10E3/UL
IMM GRANULOCYTES NFR BLD: 1 %
LYMPHOCYTES # BLD AUTO: 2.9 10E3/UL (ref 0.8–5.3)
LYMPHOCYTES NFR BLD AUTO: 25 %
M PNEUMO DNA SPEC QL NAA+PROBE: NOT DETECTED
MCH RBC QN AUTO: 33.2 PG (ref 26.5–33)
MCHC RBC AUTO-ENTMCNC: 33.7 G/DL (ref 31.5–36.5)
MCV RBC AUTO: 98 FL (ref 78–100)
MONOCYTES # BLD AUTO: 0.5 10E3/UL (ref 0–1.3)
MONOCYTES NFR BLD AUTO: 5 %
NEUTROPHILS # BLD AUTO: 8 10E3/UL (ref 1.6–8.3)
NEUTROPHILS NFR BLD AUTO: 69 %
NRBC # BLD AUTO: 0 10E3/UL
NRBC BLD AUTO-RTO: 0 /100
PLATELET # BLD AUTO: 259 10E3/UL (ref 150–450)
POTASSIUM SERPL-SCNC: 4.8 MMOL/L (ref 3.4–5.3)
PROT SERPL-MCNC: 6.9 G/DL (ref 6.4–8.3)
RBC # BLD AUTO: 3.65 10E6/UL (ref 4.4–5.9)
RSV RNA SPEC NAA+PROBE: NEGATIVE
RSV RNA SPEC QL NAA+PROBE: NOT DETECTED
RSV RNA SPEC QL NAA+PROBE: NOT DETECTED
RV+EV RNA SPEC QL NAA+PROBE: DETECTED
SARS-COV-2 RNA RESP QL NAA+PROBE: NEGATIVE
SODIUM SERPL-SCNC: 139 MMOL/L (ref 135–145)
WBC # BLD AUTO: 11.6 10E3/UL (ref 4–11)

## 2024-11-18 RX ORDER — SULFAMETHOXAZOLE AND TRIMETHOPRIM 800; 160 MG/1; MG/1
3 TABLET ORAL DAILY
Qty: 63 TABLET | Refills: 0 | Status: SHIPPED | OUTPATIENT
Start: 2024-11-18 | End: 2024-12-09

## 2024-11-18 RX ORDER — SULFAMETHOXAZOLE AND TRIMETHOPRIM 800; 160 MG/1; MG/1
4 TABLET ORAL 2 TIMES DAILY
Qty: 168 TABLET | Refills: 0 | Status: SHIPPED | OUTPATIENT
Start: 2024-11-18 | End: 2024-12-09

## 2024-11-18 NOTE — TELEPHONE ENCOUNTER
[facility] Call Center    Phone Message    May a detailed message be left on voicemail: yes     Reason for Call: Medication Question or concern regarding medication   Prescription Clarification  Name of Medication: sulfamethoxazole-trimethoprim (BACTRIM DS) 800-160 MG tablet   Prescribing Provider: Dr. Markham    Pharmacy:   [facility] [id number] [facility]      What on the order needs clarification? Wife has follow up questions about RX  from pharmacy per caller the instructions are different than what they remember was discussed at appointment.       Action Taken: Other: ID     Travel Screening: Not Applicable     Date of Service:

## 2024-11-18 NOTE — TELEPHONE ENCOUNTER
I called Nik and Lamar back regarding the dose of Bactrim. I clarified that he will get a total of 11 pills per day (4 in the AM, 3 at noon, and 4 in the PM). He will repeat his renal function tests on Friday to review.     He will also take a rapid COVID-19 test tonight and let me know if it is positive. The respiratory viral PCR is pending.     Alba Markham MD  [phone]

## 2024-11-18 NOTE — TELEPHONE ENCOUNTER
[facility] Call Center    Phone Message    May a detailed message be left on voicemail: yes     Reason for Call: Medication Question or concern regarding medication   Prescription Clarification  Name of Medication: sulfamethoxazole-trimethoprim (BACTRIM DS) 800-160 MG tablet   Prescribing Provider: Shahrzad Pineda    Pharmacy:   [facility] - [facility] [id number] [facility]      What on the order needs clarification? Per caller pharmacy received two RX for Bactrim need to clarify if this was done in error or if this is a taper off request?       Action Taken: Other: ID     Travel Screening: Not Applicable     Date of Service:

## 2024-11-19 LAB — BACTERIA BRONCH: NORMAL

## 2024-11-21 ENCOUNTER — HOSPITAL ENCOUNTER (EMERGENCY)
Facility: CLINIC | Age: 64
Discharge: HOME | End: 2024-11-21
Attending: EMERGENCY MEDICINE
Payer: MEDICARE

## 2024-11-21 ENCOUNTER — TELEPHONE (OUTPATIENT)
Dept: INFECTIOUS DISEASES | Facility: CLINIC | Age: 64
End: 2024-11-21

## 2024-11-21 ENCOUNTER — TELEPHONE (OUTPATIENT)
Dept: MULTI SPECIALTY CLINIC | Facility: CLINIC | Age: 64
End: 2024-11-21

## 2024-11-21 ENCOUNTER — NURSE TRIAGE (OUTPATIENT)
Dept: NURSING | Facility: CLINIC | Age: 64
End: 2024-11-21
Payer: MEDICARE

## 2024-11-21 ENCOUNTER — CARE COORDINATION (OUTPATIENT)
Dept: TRANSPLANT | Facility: CLINIC | Age: 64
End: 2024-11-21
Payer: MEDICARE

## 2024-11-21 ENCOUNTER — APPOINTMENT (OUTPATIENT)
Dept: CT IMAGING | Facility: CLINIC | Age: 64
End: 2024-11-21
Attending: EMERGENCY MEDICINE
Payer: MEDICARE

## 2024-11-21 VITALS
RESPIRATION RATE: 17 BRPM | HEART RATE: 83 BPM | SYSTOLIC BLOOD PRESSURE: 141 MMHG | DIASTOLIC BLOOD PRESSURE: 78 MMHG | BODY MASS INDEX: 34.46 KG/M2 | OXYGEN SATURATION: 98 % | HEIGHT: 73 IN | WEIGHT: 260 LBS | TEMPERATURE: 97.4 F

## 2024-11-21 DIAGNOSIS — R06.02 SHORTNESS OF BREATH: ICD-10-CM

## 2024-11-21 LAB
ALBUMIN SERPL BCG-MCNC: 3.9 G/DL (ref 3.5–5.2)
ALP SERPL-CCNC: 107 U/L (ref 40–150)
ALT SERPL W P-5'-P-CCNC: 37 U/L (ref 0–70)
ANION GAP SERPL CALCULATED.3IONS-SCNC: 13 MMOL/L (ref 7–15)
AST SERPL W P-5'-P-CCNC: 41 U/L (ref 0–45)
ATRIAL RATE - MUSE: 72 BPM
BACTERIA BRONCH: NORMAL
BASOPHILS # BLD AUTO: 0 10E3/UL (ref 0–0.2)
BASOPHILS NFR BLD AUTO: 0 %
BILIRUB SERPL-MCNC: 0.2 MG/DL
BUN SERPL-MCNC: 38.8 MG/DL (ref 8–23)
C PNEUM DNA SPEC QL NAA+PROBE: NOT DETECTED
CALCIUM SERPL-MCNC: 9.9 MG/DL (ref 8.8–10.4)
CHLORIDE SERPL-SCNC: 100 MMOL/L (ref 98–107)
CREAT SERPL-MCNC: 1.81 MG/DL (ref 0.67–1.17)
CYSTATIN C (ROCHE): 1.7 MG/L (ref 0.6–1)
DIASTOLIC BLOOD PRESSURE - MUSE: NORMAL MMHG
EGFRCR SERPLBLD CKD-EPI 2021: 41 ML/MIN/1.73M2
EOSINOPHIL # BLD AUTO: 0 10E3/UL (ref 0–0.7)
EOSINOPHIL NFR BLD AUTO: 0 %
ERYTHROCYTE [DISTWIDTH] IN BLOOD BY AUTOMATED COUNT: 16.7 % (ref 10–15)
FLUAV H1 2009 PAND RNA SPEC QL NAA+PROBE: NOT DETECTED
FLUAV H1 RNA SPEC QL NAA+PROBE: NOT DETECTED
FLUAV H3 RNA SPEC QL NAA+PROBE: NOT DETECTED
FLUAV RNA SPEC QL NAA+PROBE: NEGATIVE
FLUAV RNA SPEC QL NAA+PROBE: NOT DETECTED
FLUBV RNA RESP QL NAA+PROBE: NEGATIVE
FLUBV RNA SPEC QL NAA+PROBE: NOT DETECTED
GFR/BSA.PRED SERPLBLD CYS-BASED-ARV: 38 ML/MIN/1.73M2
GLUCOSE SERPL-MCNC: 169 MG/DL (ref 70–99)
HADV DNA SPEC QL NAA+PROBE: NOT DETECTED
HCO3 SERPL-SCNC: 19 MMOL/L (ref 22–29)
HCOV PNL SPEC NAA+PROBE: NOT DETECTED
HCT VFR BLD AUTO: 35 % (ref 40–53)
HGB BLD-MCNC: 11.8 G/DL (ref 13.3–17.7)
HMPV RNA SPEC QL NAA+PROBE: NOT DETECTED
HPIV1 RNA SPEC QL NAA+PROBE: NOT DETECTED
HPIV2 RNA SPEC QL NAA+PROBE: NOT DETECTED
HPIV3 RNA SPEC QL NAA+PROBE: NOT DETECTED
HPIV4 RNA SPEC QL NAA+PROBE: NOT DETECTED
IMM GRANULOCYTES # BLD: 0.1 10E3/UL
IMM GRANULOCYTES NFR BLD: 1 %
INTERPRETATION ECG - MUSE: NORMAL
LYMPHOCYTES # BLD AUTO: 2.6 10E3/UL (ref 0.8–5.3)
LYMPHOCYTES NFR BLD AUTO: 26 %
M PNEUMO DNA SPEC QL NAA+PROBE: NOT DETECTED
MCH RBC QN AUTO: 32.3 PG (ref 26.5–33)
MCHC RBC AUTO-ENTMCNC: 33.7 G/DL (ref 31.5–36.5)
MCV RBC AUTO: 96 FL (ref 78–100)
MONOCYTES # BLD AUTO: 0.5 10E3/UL (ref 0–1.3)
MONOCYTES NFR BLD AUTO: 5 %
NEUTROPHILS # BLD AUTO: 6.9 10E3/UL (ref 1.6–8.3)
NEUTROPHILS NFR BLD AUTO: 68 %
NRBC # BLD AUTO: 0 10E3/UL
NRBC BLD AUTO-RTO: 0 /100
NT-PROBNP SERPL-MCNC: 220 PG/ML (ref 0–900)
P AXIS - MUSE: 79 DEGREES
PLATELET # BLD AUTO: 231 10E3/UL (ref 150–450)
POTASSIUM SERPL-SCNC: 4.7 MMOL/L (ref 3.4–5.3)
PR INTERVAL - MUSE: 168 MS
PROT SERPL-MCNC: 6.7 G/DL (ref 6.4–8.3)
QRS DURATION - MUSE: 92 MS
QT - MUSE: 420 MS
QTC - MUSE: 459 MS
R AXIS - MUSE: 56 DEGREES
RBC # BLD AUTO: 3.65 10E6/UL (ref 4.4–5.9)
RSV RNA SPEC NAA+PROBE: NEGATIVE
RSV RNA SPEC QL NAA+PROBE: NOT DETECTED
RSV RNA SPEC QL NAA+PROBE: NOT DETECTED
RV+EV RNA SPEC QL NAA+PROBE: DETECTED
SARS-COV-2 RNA RESP QL NAA+PROBE: NEGATIVE
SODIUM SERPL-SCNC: 132 MMOL/L (ref 135–145)
SYSTOLIC BLOOD PRESSURE - MUSE: NORMAL MMHG
T AXIS - MUSE: 82 DEGREES
TROPONIN T SERPL HS-MCNC: 20 NG/L
TROPONIN T SERPL HS-MCNC: 21 NG/L
VENTRICULAR RATE- MUSE: 72 BPM
WBC # BLD AUTO: 10.1 10E3/UL (ref 4–11)

## 2024-11-21 PROCEDURE — 85004 AUTOMATED DIFF WBC COUNT: CPT | Performed by: EMERGENCY MEDICINE

## 2024-11-21 PROCEDURE — 96360 HYDRATION IV INFUSION INIT: CPT | Mod: 59 | Performed by: EMERGENCY MEDICINE

## 2024-11-21 PROCEDURE — G1010 CDSM STANSON: HCPCS

## 2024-11-21 PROCEDURE — 82040 ASSAY OF SERUM ALBUMIN: CPT | Performed by: EMERGENCY MEDICINE

## 2024-11-21 PROCEDURE — 250N000011 HC RX IP 250 OP 636: Performed by: EMERGENCY MEDICINE

## 2024-11-21 PROCEDURE — 87633 RESP VIRUS 12-25 TARGETS: CPT | Mod: 59 | Performed by: EMERGENCY MEDICINE

## 2024-11-21 PROCEDURE — 71275 CT ANGIOGRAPHY CHEST: CPT | Mod: 26 | Performed by: RADIOLOGY

## 2024-11-21 PROCEDURE — 93010 ELECTROCARDIOGRAM REPORT: CPT | Performed by: EMERGENCY MEDICINE

## 2024-11-21 PROCEDURE — G1010 CDSM STANSON: HCPCS | Performed by: RADIOLOGY

## 2024-11-21 PROCEDURE — 85041 AUTOMATED RBC COUNT: CPT | Performed by: EMERGENCY MEDICINE

## 2024-11-21 PROCEDURE — 87633 RESP VIRUS 12-25 TARGETS: CPT | Performed by: EMERGENCY MEDICINE

## 2024-11-21 PROCEDURE — 87486 CHLMYD PNEUM DNA AMP PROBE: CPT | Performed by: EMERGENCY MEDICINE

## 2024-11-21 PROCEDURE — 36415 COLL VENOUS BLD VENIPUNCTURE: CPT | Performed by: EMERGENCY MEDICINE

## 2024-11-21 PROCEDURE — 82374 ASSAY BLOOD CARBON DIOXIDE: CPT | Performed by: EMERGENCY MEDICINE

## 2024-11-21 PROCEDURE — 82610 CYSTATIN C: CPT | Performed by: EMERGENCY MEDICINE

## 2024-11-21 PROCEDURE — 84484 ASSAY OF TROPONIN QUANT: CPT | Performed by: EMERGENCY MEDICINE

## 2024-11-21 PROCEDURE — 87581 M.PNEUMON DNA AMP PROBE: CPT | Performed by: EMERGENCY MEDICINE

## 2024-11-21 PROCEDURE — 93005 ELECTROCARDIOGRAM TRACING: CPT | Performed by: EMERGENCY MEDICINE

## 2024-11-21 PROCEDURE — 80053 COMPREHEN METABOLIC PANEL: CPT | Performed by: EMERGENCY MEDICINE

## 2024-11-21 PROCEDURE — 99285 EMERGENCY DEPT VISIT HI MDM: CPT | Performed by: EMERGENCY MEDICINE

## 2024-11-21 PROCEDURE — 258N000003 HC RX IP 258 OP 636: Performed by: EMERGENCY MEDICINE

## 2024-11-21 PROCEDURE — 87637 SARSCOV2&INF A&B&RSV AMP PRB: CPT | Performed by: EMERGENCY MEDICINE

## 2024-11-21 PROCEDURE — 71275 CT ANGIOGRAPHY CHEST: CPT | Mod: MF

## 2024-11-21 PROCEDURE — 99285 EMERGENCY DEPT VISIT HI MDM: CPT | Mod: 25 | Performed by: EMERGENCY MEDICINE

## 2024-11-21 PROCEDURE — 83880 ASSAY OF NATRIURETIC PEPTIDE: CPT | Performed by: EMERGENCY MEDICINE

## 2024-11-21 RX ORDER — IOPAMIDOL 755 MG/ML
100 INJECTION, SOLUTION INTRAVASCULAR ONCE
Status: COMPLETED | OUTPATIENT
Start: 2024-11-21 | End: 2024-11-21

## 2024-11-21 RX ORDER — HEPARIN SODIUM (PORCINE) LOCK FLUSH IV SOLN 100 UNIT/ML 100 UNIT/ML
SOLUTION INTRAVENOUS
Status: DISCONTINUED
Start: 2024-11-21 | End: 2024-11-21 | Stop reason: HOSPADM

## 2024-11-21 RX ADMIN — IOPAMIDOL 100 ML: 755 INJECTION, SOLUTION INTRAVENOUS at 18:29

## 2024-11-21 RX ADMIN — SODIUM CHLORIDE 500 ML: 9 INJECTION, SOLUTION INTRAVENOUS at 18:48

## 2024-11-21 ASSESSMENT — COLUMBIA-SUICIDE SEVERITY RATING SCALE - C-SSRS
1. IN THE PAST MONTH, HAVE YOU WISHED YOU WERE DEAD OR WISHED YOU COULD GO TO SLEEP AND NOT WAKE UP?: NO
6. HAVE YOU EVER DONE ANYTHING, STARTED TO DO ANYTHING, OR PREPARED TO DO ANYTHING TO END YOUR LIFE?: NO
2. HAVE YOU ACTUALLY HAD ANY THOUGHTS OF KILLING YOURSELF IN THE PAST MONTH?: NO

## 2024-11-21 ASSESSMENT — ACTIVITIES OF DAILY LIVING (ADL)
ADLS_ACUITY_SCORE: 0

## 2024-11-21 NOTE — ED TRIAGE NOTES
Pt with recent history of PNA started on bactrim presents today with concerns of SOB and cough. Pt was instructed to come to the ED for evaluation for possible PE vs worsening pneumonia.      Triage Assessment (Adult)       Row Name 11/21/24 1516          Triage Assessment    Airway WDL WDL        Respiratory WDL    Respiratory WDL WDL        Skin Circulation/Temperature WDL    Skin Circulation/Temperature WDL WDL        Peripheral/Neurovascular WDL    Peripheral Neurovascular WDL WDL        Cognitive/Neuro/Behavioral WDL    Cognitive/Neuro/Behavioral WDL WDL

## 2024-11-21 NOTE — PROGRESS NOTES
Spoke with [place] regarding triage page with concerns of SOB. Nik is willing to go to the ER for further eval.

## 2024-11-21 NOTE — TELEPHONE ENCOUNTER
Per Dr. Garcia: Yes patient should be seen in ED. Yes, due to his symptoms and past respiratory illnesses.      Writer called patient, wife answered. Writer relayed message from Dr. Garcia. Wife stated they are also reaching out to ID to see what they recommend.

## 2024-11-21 NOTE — ED PROVIDER NOTES
[place] EMERGENCY DEPARTMENT ([facility])    11/21/24       ED PROVIDER NOTE   History     Chief Complaint   Patient presents with    Shortness of Breath     Pt with recent history of PNA started on bactrim presents today with concerns of SOB and cough. Pt was instructed to come to the ED for evaluation for possible PE vs worsening pneumonia.      The history is provided by the patient, the spouse and medical records.     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 Patient was started on Bactrim on Saturday for pneumonia symptoms. He began experiencing shortness of breath and chest heaviness earlier today. A few days ago the patient had a dry cough which is now a productive cough. Denies coughing up blood. He also reports minimal bilateral leg swelling. In addition, the patients wife reports the patient had anxiety and difficulty sleeping last night. He took a lorazepam which he has not taken in a while. His wife notes a prior allergic reaction to bactrim when the patient had chemotherapy years ago. His wife expressed concerns of his current symptoms being a result of a reaction to bactrim. Not on blood thinners. Denies fever, vomiting, or leg pain.      Past Medical History  Past Medical History:   Diagnosis Date    ALL (acute lymphocytic leukemia) (H)     CKD (chronic kidney disease)     GVHD (graft versus host disease) (H)     H/O peripheral stem cell transplant (H)     Hyperthyroidism 1996    Infection due to 2019 novel coronavirus 01/16/2023    Influenza A 11/2022    Postablative hypothyroidism 1997    Pulmonary embolism (H)     Renal cell carcinoma (H) 2007    right kidney    Sleep apnea      Past  Surgical History:   Procedure Laterality Date    APPENDECTOMY      BACK SURGERY  2017    BONE MARROW BIOPSY, BONE SPECIMEN, NEEDLE/TROCAR Left 09/02/2021    Procedure: BIOPSY, BONE MARROW;  Surgeon: Jailyn Ny APRN CNP;  Location: [facility] OR    BONE MARROW BIOPSY, BONE SPECIMEN, NEEDLE/TROCAR Left 11/15/2021    Procedure: BIOPSY, BONE MARROW;  Surgeon: Socrates De La Torre;  Location: [facility] OR    BONE MARROW BIOPSY, BONE SPECIMEN, NEEDLE/TROCAR Left 02/07/2022    Procedure: BIOPSY, BONE MARROW;  Surgeon: Renetta Wiggins PA-C;  Location: [facility] OR    BONE MARROW BIOPSY, BONE SPECIMEN, NEEDLE/TROCAR Left 08/18/2022    Procedure: BIOPSY, BONE MARROW;  Surgeon: Lorrie Yap PA-C;  Location: [facility] OR    BONE MARROW BIOPSY, BONE SPECIMEN, NEEDLE/TROCAR Left 08/07/2023    Procedure: Bone marrow biopsy, bone specimen, needle/trocar;  Surgeon: Teressa Rick PA-C;  Location: [facility] OR    BRONCHOSCOPY (RIGID OR FLEXIBLE), DIAGNOSTIC N/A 04/29/2022    Procedure: BRONCHOSCOPY, WITH BRONCHOALVEOLAR LAVAGE;  Surgeon: Murtaza Garcia MD;  Location: [facility] GI    BRONCHOSCOPY (RIGID OR FLEXIBLE), DIAGNOSTIC N/A 11/1/2024    Procedure: BRONCHOSCOPY, WITH BRONCHOALVEOLAR LAVAGE;  Surgeon: Murtaza Garcia MD;  Location: [facility] GI    IR CVC TUNNEL PLACEMENT > 5 YRS OF AGE  08/04/2021    IR CVC TUNNEL REMOVAL LEFT  09/02/2021    IR LIVER BIOPSY PERCUTANEOUS  02/21/2022    NEPHRECTOMY  2007    ORTHOPEDIC SURGERY      bilateral shoulder surgeries    PERCUTANEOUS BIOPSY LIVER N/A 02/21/2022    Procedure: NEEDLE BIOPSY, LIVER, PERCUTANEOUS;  Surgeon: Trace Castellanos MD;  Location: [facility] OR    PHACOEMULSIFICATION WITH STANDARD INTRAOCULAR LENS IMPLANT Left 1/15/2024    Procedure: LEFT EYE PHACOEMULSIFICATION, CATARACT, WITH STANDARD INTRAOCULAR LENS IMPLANT INSERTION;  Surgeon: Deshawn Menezes MD;  Location: [facility] OR    PHACOEMULSIFICATION WITH STANDARD INTRAOCULAR LENS IMPLANT Right 2/5/2024    Procedure: RIGHT EYE  PHACOEMULSIFICATION, CATARACT, WITH STANDARD INTRAOCULAR LENS IMPLANT INSERTION;  Surgeon: Deshawn Menezes MD;  Location: [facility] OR     acyclovir (ZOVIRAX) 400 MG tablet  amLODIPine (NORVASC) 5 MG tablet  Carboxymethylcell-Glycerin PF (REFRESH RELIEVA PF) 0.5-1 % SOLN  dexAMETHasone alcohol-free (DECADRON) 0.1 MG/ML solution  doxycycline hyclate (VIBRAMYCIN) 100 MG capsule  escitalopram (LEXAPRO) 10 MG tablet  fluorometholone (FML LIQUIFILM) 0.1 % ophthalmic suspension  levothyroxine (SYNTHROID/LEVOTHROID) 112 MCG tablet  LORazepam (ATIVAN) 1 MG tablet  metroNIDAZOLE (METROCREAM) 0.75 % external cream  nicotine polacrilex (NICORETTE) 4 MG gum  Nutritional Supplements (ENSURE COMPLETE SHAKE) LIQD  omeprazole (PRILOSEC) 40 MG DR capsule  penicillin V (VEETID) 250 MG tablet  predniSONE (DELTASONE) 10 MG tablet  predniSONE (DELTASONE) 5 MG tablet  PREVIDENT 5000 BOOSTER PLUS 1.1 % PSTE dental paste  rosuvastatin (CRESTOR) 5 MG tablet  sennosides (SENOKOT) 8.6 MG tablet  sodium chloride 0.9 % neb solution  sulfamethoxazole-trimethoprim (BACTRIM DS) 800-160 MG tablet  sulfamethoxazole-trimethoprim (BACTRIM DS) 800-160 MG tablet  tacrolimus (PROTOPIC) 0.1 % external ointment      Allergies   Allergen Reactions    Sulfamethoxazole-Trimethoprim Rash     Family History  Family History   Problem Relation Age of Onset    Lung Cancer Mother     Depression Mother     Polycythemia Father     Anesthesia Reaction Father         slow to awaken    Coronary Artery Disease Maternal Grandmother     Diabetes Maternal Grandmother     Hypertension Maternal Grandmother     Hypothyroidism Sister     Glaucoma No family hx of     Macular Degeneration No family hx of     Venous thrombosis No family hx of     Melanoma No family hx of     Skin Cancer No family hx of      Social History   Social History     Tobacco Use    Smoking status: Former     Current packs/day: 0.00     Average packs/day: 2.0 packs/day for 30.0 years (60.0 ttl pk-yrs)      Types: Cigarettes     Start date: 1989     Quit date: 2019     Years since quittin.5     Passive exposure: Past    Smokeless tobacco: Never    Tobacco comments:     DAILY USE OF NICORETTE GUM   Vaping Use    Vaping status: Never Used   Substance Use Topics    Alcohol use: Not Currently    Drug use: Never      A complete review of systems was performed with pertinent positives and negatives noted in the HPI, and all other systems negative.    Physical Exam      Physical Exam  Physical Exam   Constitutional: oriented to person, place, and time. appears well-developed and well-nourished.   HENT:   Head: Normocephalic and atraumatic.   Neck: Normal range of motion.   Pulmonary/Chest: Effort normal. No respiratory distress.   Cardiac: No murmurs, rubs, gallops. RRR.  Abdominal: Abdomen soft, nontender, nondistended. No rebound tenderness.  MSK: Long bones without deformity or evidence of trauma  Neurological: alert and oriented to person, place, and time.   Skin: Skin is warm and dry.   Psychiatric:  normal mood and affect.  behavior is normal. Thought content normal.      ED Course, Procedures, & Data      Procedures            EKG Interpretation:      Interpreted by Jin Paz MD  Time reviewed: 1540  Symptoms at time of EKG: chest pain   Rhythm: normal sinus   Rate: normal  Axis: normal  Ectopy: none  Conduction: normal  ST Segments/ T Waves: No ST-T wave changes  Q Waves: none  Comparison to prior: Unchanged    Clinical Impression: normal EKG           Results for orders placed or performed during the hospital encounter of 24   CT Chest Pulmonary Embolism w Contrast     Status: None (Preliminary result)    Impression    RESIDENT PRELIMINARY INTERPRETATION  IMPRESSION:   1. Exam is negative for acute pulmonary embolism.       Evidence for right heart strain or increased right heart pressures?   is not present.     2. Scattered groundglass opacities within the bilateral lung fields  are  suggestive of infection.    In the event of a positive result for acute pulmonary embolism  resulting in right heart strain, consider calling the   [facility] patient placement ([phone]) for PERT (Pulmonary Embolism  Response Team) Activation?    PERT -- Pulmonary Embolism Response Team (Multidisciplinary team  including cardiology, interventional radiology, critical care,  hematology)   Influenza A/B, RSV and SARS-CoV2 PCR (COVID-19) Nose     Status: Normal    Specimen: Nose; Swab   Result Value Ref Range    Influenza A PCR Negative Negative    Influenza B PCR Negative Negative    RSV PCR Negative Negative    SARS CoV2 PCR Negative Negative    Narrative    Testing was performed using the Xpert Xpress CoV2/Flu/RSV Assay on the Cepheid GeneXpert Instrument. This test should be ordered for the detection of SARS-CoV2, influenza, and RSV viruses in individuals with signs and symptoms of respiratory tract infection. This test is for in vitro diagnostic use under the [place] FDA for laboratories certified under CLIA to perform high or moderate complexity testing. This test has been [place] FDA cleared. A negative result does not rule out the presence of PCR inhibitors in the specimen or target RNA in concentration below the limit of detection for the assay. If only one viral target is positive but coinfection with multiple targets is suspected, the sample should be re-tested with another FDA cleared, approved, or authorized test, if coninfection would change clinical management. This test was validated by the [facility] [employer]. These laboratories are certified under the Clinical Laboratory Improvement Amendments of 1988 (CLIA-88) as qualified to perfom high complexity laboratory testing.   Troponin T, High Sensitivity     Status: Normal   Result Value Ref Range    Troponin T, High Sensitivity 21 <=22 ng/L   Comprehensive metabolic panel     Status: Abnormal   Result Value Ref Range    Sodium 132 (L) 135 - 145 mmol/L     Potassium 4.7 3.4 - 5.3 mmol/L    Carbon Dioxide (CO2) 19 (L) 22 - 29 mmol/L    Anion Gap 13 7 - 15 mmol/L    Urea Nitrogen 38.8 (H) 8.0 - 23.0 mg/dL    Creatinine 1.81 (H) 0.67 - 1.17 mg/dL    GFR Estimate 41 (L) >60 mL/min/1.73m2    Calcium 9.9 8.8 - 10.4 mg/dL    Chloride 100 98 - 107 mmol/L    Glucose 169 (H) 70 - 99 mg/dL    Alkaline Phosphatase 107 40 - 150 U/L    AST 41 0 - 45 U/L    ALT 37 0 - 70 U/L    Protein Total 6.7 6.4 - 8.3 g/dL    Albumin 3.9 3.5 - 5.2 g/dL    Bilirubin Total 0.2 <=1.2 mg/dL   Nt probnp inpatient (BNP)     Status: Normal   Result Value Ref Range    N terminal Pro BNP Inpatient 220 0 - 900 pg/mL   CBC with platelets and differential     Status: Abnormal   Result Value Ref Range    WBC Count 10.1 4.0 - 11.0 10e3/uL    RBC Count 3.65 (L) 4.40 - 5.90 10e6/uL    Hemoglobin 11.8 (L) 13.3 - 17.7 g/dL    Hematocrit 35.0 (L) 40.0 - 53.0 %    MCV 96 78 - 100 fL    MCH 32.3 26.5 - 33.0 pg    MCHC 33.7 31.5 - 36.5 g/dL    RDW 16.7 (H) 10.0 - 15.0 %    Platelet Count 231 150 - 450 10e3/uL    % Neutrophils 68 %    % Lymphocytes 26 %    % Monocytes 5 %    % Eosinophils 0 %    % Basophils 0 %    % Immature Granulocytes 1 %    NRBCs per 100 WBC 0 <1 /100    Absolute Neutrophils 6.9 1.6 - 8.3 10e3/uL    Absolute Lymphocytes 2.6 0.8 - 5.3 10e3/uL    Absolute Monocytes 0.5 0.0 - 1.3 10e3/uL    Absolute Eosinophils 0.0 0.0 - 0.7 10e3/uL    Absolute Basophils 0.0 0.0 - 0.2 10e3/uL    Absolute Immature Granulocytes 0.1 <=0.4 10e3/uL    Absolute NRBCs 0.0 10e3/uL   Troponin T, High Sensitivity     Status: Normal   Result Value Ref Range    Troponin T, High Sensitivity 20 <=22 ng/L   EKG 12-lead, tracing only     Status: None (Preliminary result)   Result Value Ref Range    Systolic Blood Pressure  mmHg    Diastolic Blood Pressure  mmHg    Ventricular Rate 72 BPM    Atrial Rate 72 BPM    IN Interval 168 ms    QRS Duration 92 ms     ms    QTc 459 ms    P Axis 79 degrees    R AXIS 56 degrees    T Axis 82  degrees    Interpretation ECG Sinus rhythm  Normal ECG      CBC with platelets differential     Status: Abnormal    Narrative    The following orders were created for panel order CBC with platelets differential.  Procedure                               Abnormality         Status                     ---------                               -----------         ------                     CBC with platelets and d...[[id number]]  Abnormal            Final result                 Please view results for these tests on the individual orders.     Medications - No data to display  Labs Ordered and Resulted from Time of ED Arrival to Time of ED Departure - No data to display  No orders to display          Critical care was not performed.     Medical Decision Making  The patient's presentation was of high complexity (an acute health issue posing potential threat to life or bodily function).    The patient's evaluation involved:  review of external note(s) from 1 sources (I reviewed most recent infectious disease note)  review of 3+ test result(s) ordered prior to this encounter (I reviewed most recent viral panel, COVID, pneumocystis, creatinine)  ordering and/or review of 3+ test(s) in this encounter (see separate area of note for details)  discussion of management or test interpretation with another health professional (infectious disease physician)    The patient's management necessitated only low risk treatment.    Assessment & Plan    Patient resenting with shortness of breath.  No evidence of ACS, heart failure or pulmonary embolism based on EKG, troponins, BNP in addition to chest CT.  Vitals are stable.  He is not on oxygen.  He does have ongoing groundglass opacities.  There is no fever today.  White blood count is normal.  Creatinine slightly elevated from baseline here.  I did discuss this with the infectious disease on-call physician.  They stated this is likely related to recent Bactrim use.  They feel that this is  potentially artificial related to the Bactrim and to continue the Bactrim.  He has labs tomorrow.  Infectious disease will reach out to him for any changes and further follow-up.  Patient is otherwise well-appearing, nontoxic and safe for discharge.  They know they can return if they have any further concerns.    I have reviewed the nursing notes. I have reviewed the findings, diagnosis, plan and need for follow up with the patient.    New Prescriptions    No medications on file       Final diagnoses:   Shortness of breath   I, Lorena Gomez, am serving as a trained medical scribe to document services personally performed by Jin Paz MD, based on the provider's statements to me.     I, Jin Paz MD, was physically present and have reviewed and verified the accuracy of this note documented by Lorena Gomez.     Jin Paz MD   [facility] EMERGENCY DEPARTMENT  11/21/2024     Jin Paz MD  11/21/24 1938

## 2024-11-21 NOTE — TELEPHONE ENCOUNTER
"    Nurse Triage SBAR    Is this a 2nd Level Triage? YES, LICENSED PRACTITIONER REVIEW IS REQUIRED    Situation: Patient and spouse calling to report new onset SOB with activity that came on today, they are wondereing if it is a side effect of Bactrim started on Saturday (>5 days ago).     Background: Last seen in ID clinic 11/21/24. Appt tomorrow with BMT.    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), cGvHD of eyes, skin, GI tract and liver     Not on anticoagulation    Assessment:  -Started Bactrim on Saturday for pneumonia symptoms that have \"mostly resolved\" but provider recommended tx for 3weeks  -Spouse wondering if the SOB is a s/e of bactrim  -Today patient began having SOB moving around, noticed after walking up the stairs at home  -SOB only with activity, recovers quickly after stopping activity  -States \"feels like I just got done running\"  -Reported home vitals this morning were:    SpO2 97%, HR 70, /79, T 97.0  -Had some mild SOB with the pneumonia but states \"this is worse\"  -Grandson has a cold, patient believes he also caught the URI  -No diarrhea/gi issues  -No hives/rash/facial swelling/trouble swallowing  -No calf pain      Protocol Recommended Disposition:   Go To ED/UCC Now (Or To Office With PCP Approval) (overriding Go to ED Now)    Recommendation: Recommended patient be seen in ER d/t hx of PE and not on blood thinners, recently more sedentary than usual. Patient and spouse states they can go to the clinic for imaging if it gets ordered. Advised that ER is still recommended but that message would be sent to care team to further advise. They verbalize understanding and will wait for a callback. If patient  worsens, chest pain, they should proceed directly to ER.    Routed to provider/care team.    ENRIQUE Griffith  P Central Nursing/Red Flag Triage & Med Refill Team            Reason for Disposition   Any history of prior \"blood clot\" in leg or lungs    Additional Information   Negative: " "SEVERE difficulty breathing (e.g., struggling for each breath, speaks in single words, pulse > 120)   Negative: Breathing stopped and hasn't returned   Negative: Choking on something   Negative: Bluish (or gray) lips or face   Negative: Difficult to awaken or acting confused (e.g., disoriented, slurred speech)   Negative: Passed out (e.g., fainted, lost consciousness, blacked out and was not responding)   Negative: Wheezing started suddenly after medicine, an allergic food, or bee sting   Negative: Stridor (harsh sound while breathing in)   Negative: Slow, shallow and weak breathing   Negative: Sounds like a life-threatening emergency to the triager   Negative: Chest pain   Negative: Wheezing (high pitched whistling sound) and previous asthma attacks or use of asthma medicines   Negative: Breathing difficulty and within 14 days of COVID-19 EXPOSURE (close contact) with someone diagnosed with COVID-19 (e.g., COVID test positive)   Negative: Breathing difficulty and COVID-19 is widespread in the community   Negative: Breathing diffculty and only present when coughing   Negative: Breathing difficulty and only from stuffy nose   Negative: Breathing diffculty and only from stuffy nose or runny nose from common cold    Answer Assessment - Initial Assessment Questions  1. RESPIRATORY STATUS: \"Describe your breathing?\" (e.g., wheezing, shortness of breath, unable to speak, severe coughing)       Shortness of breath on exertion    2. ONSET: \"When did this breathing problem begin?\"       Started today    3. PATTERN \"Does the difficult breathing come and go, or has it been constant since it started?\"       Intermittent - only with activity    4. SEVERITY: \"How bad is your breathing?\" (e.g., mild, moderate, severe)       Patient says he \"doesn't feel bad\" but reports that after doing any activity he feels like he \"just got done running\" type of breathing    5. RECURRENT SYMPTOM: \"Have you had difficulty breathing before?\" If " "Yes, ask: \"When was the last time?\" and \"What happened that time?\"       Recently has had some SOB with the recent pneumonia but \"this is worse\"    6. CARDIAC HISTORY: \"Do you have any history of heart disease?\" (e.g., heart attack, angina, bypass surgery, angioplasty)       no    7. LUNG HISTORY: \"Do you have any history of lung disease?\"  (e.g., pulmonary embolus, asthma, emphysema)      Hx PE - not on blood thinners    8. CAUSE: \"What do you think is causing the breathing problem?\"       Pt and spouse wondering if it is r/t Bactrim regimen started on Saturday    9. OTHER SYMPTOMS: \"Do you have any other symptoms?\" (e.g., chest pain, cough, dizziness, fever, runny nose)      Cold symptoms, Reports he caught a cold from grandson. Denies fever, chills, chest pain, dizziness, palpitations, calf/leg pain.    10. O2 SATURATION MONITOR:  \"Do you use an oxygen saturation monitor (pulse oximeter) at home?\" If Yes, ask: \"What is your reading (oxygen level) today?\" \"What is your usual oxygen saturation reading?\" (e.g., 95%)        This morning was 97%, HR 70, /79, T 97.0    11. PREGNANCY: \"Is there any chance you are pregnant?\" \"When was your last menstrual period?\"        N/a    12. TRAVEL: \"Have you traveled out of the country in the last month?\" (e.g., travel history, exposures)        [place] sick with URI    Protocols used: Breathing Difficulty-A-[place]    "

## 2024-11-21 NOTE — TELEPHONE ENCOUNTER
Called Nik to see how he was doing and how his shortness of breath is. He is currently in the [facility] ED getting evaluated. I informed him that I am currently on clinical service and would be happy to see him tomorrow if he is admitted to the hospital. Otherwise, he is currently awaiting the results of his EKG, chest CT, and labs.     Alba Markham MD  [phone]

## 2024-11-22 ENCOUNTER — APPOINTMENT (OUTPATIENT)
Dept: LAB | Facility: CLINIC | Age: 64
End: 2024-11-22
Attending: INTERNAL MEDICINE
Payer: MEDICARE

## 2024-11-22 ENCOUNTER — ONCOLOGY VISIT (OUTPATIENT)
Dept: TRANSPLANT | Facility: CLINIC | Age: 64
End: 2024-11-22
Attending: INTERNAL MEDICINE
Payer: MEDICARE

## 2024-11-22 VITALS
DIASTOLIC BLOOD PRESSURE: 73 MMHG | OXYGEN SATURATION: 98 % | RESPIRATION RATE: 16 BRPM | TEMPERATURE: 98 F | BODY MASS INDEX: 35.62 KG/M2 | WEIGHT: 270 LBS | SYSTOLIC BLOOD PRESSURE: 136 MMHG | HEART RATE: 92 BPM

## 2024-11-22 DIAGNOSIS — T86.09 GVHD AS COMPLICATION OF BONE MARROW TRANSPLANT (H): ICD-10-CM

## 2024-11-22 DIAGNOSIS — D89.813 GVHD AS COMPLICATION OF BONE MARROW TRANSPLANT (H): ICD-10-CM

## 2024-11-22 DIAGNOSIS — N18.2 CKD (CHRONIC KIDNEY DISEASE) STAGE 2, GFR 60-89 ML/MIN: ICD-10-CM

## 2024-11-22 DIAGNOSIS — Z94.81 S/P BONE MARROW TRANSPLANT (H): ICD-10-CM

## 2024-11-22 LAB
ALBUMIN SERPL BCG-MCNC: 4.1 G/DL (ref 3.5–5.2)
ALP SERPL-CCNC: 114 U/L (ref 40–150)
ALT SERPL W P-5'-P-CCNC: 39 U/L (ref 0–70)
ANION GAP SERPL CALCULATED.3IONS-SCNC: 11 MMOL/L (ref 7–15)
AST SERPL W P-5'-P-CCNC: 43 U/L (ref 0–45)
BASOPHILS # BLD AUTO: 0 10E3/UL (ref 0–0.2)
BASOPHILS NFR BLD AUTO: 0 %
BILIRUB SERPL-MCNC: 0.2 MG/DL
BUN SERPL-MCNC: 40 MG/DL (ref 8–23)
CALCIUM SERPL-MCNC: 10 MG/DL (ref 8.8–10.4)
CHLORIDE SERPL-SCNC: 100 MMOL/L (ref 98–107)
CREAT SERPL-MCNC: 1.82 MG/DL (ref 0.67–1.17)
EGFRCR SERPLBLD CKD-EPI 2021: 40 ML/MIN/1.73M2
EOSINOPHIL # BLD AUTO: 0 10E3/UL (ref 0–0.7)
EOSINOPHIL NFR BLD AUTO: 0 %
ERYTHROCYTE [DISTWIDTH] IN BLOOD BY AUTOMATED COUNT: 16.6 % (ref 10–15)
GLUCOSE SERPL-MCNC: 101 MG/DL (ref 70–99)
HCO3 SERPL-SCNC: 20 MMOL/L (ref 22–29)
HCT VFR BLD AUTO: 32.5 % (ref 40–53)
HGB BLD-MCNC: 11.6 G/DL (ref 13.3–17.7)
IMM GRANULOCYTES # BLD: 0.1 10E3/UL
IMM GRANULOCYTES NFR BLD: 1 %
LYMPHOCYTES # BLD AUTO: 3.3 10E3/UL (ref 0.8–5.3)
LYMPHOCYTES NFR BLD AUTO: 29 %
MCH RBC QN AUTO: 32.9 PG (ref 26.5–33)
MCHC RBC AUTO-ENTMCNC: 35.7 G/DL (ref 31.5–36.5)
MCV RBC AUTO: 92 FL (ref 78–100)
MONOCYTES # BLD AUTO: 0.5 10E3/UL (ref 0–1.3)
MONOCYTES NFR BLD AUTO: 5 %
NEUTROPHILS # BLD AUTO: 7.4 10E3/UL (ref 1.6–8.3)
NEUTROPHILS NFR BLD AUTO: 65 %
NRBC # BLD AUTO: 0 10E3/UL
NRBC BLD AUTO-RTO: 0 /100
PLATELET # BLD AUTO: 231 10E3/UL (ref 150–450)
POTASSIUM SERPL-SCNC: 5.5 MMOL/L (ref 3.4–5.3)
PROT SERPL-MCNC: 6.9 G/DL (ref 6.4–8.3)
RBC # BLD AUTO: 3.53 10E6/UL (ref 4.4–5.9)
SODIUM SERPL-SCNC: 131 MMOL/L (ref 135–145)
WBC # BLD AUTO: 11.4 10E3/UL (ref 4–11)

## 2024-11-22 PROCEDURE — 250N000011 HC RX IP 250 OP 636: Performed by: INTERNAL MEDICINE

## 2024-11-22 PROCEDURE — 85004 AUTOMATED DIFF WBC COUNT: CPT | Performed by: INTERNAL MEDICINE

## 2024-11-22 PROCEDURE — 99214 OFFICE O/P EST MOD 30 MIN: CPT | Mod: GC | Performed by: INTERNAL MEDICINE

## 2024-11-22 PROCEDURE — 80053 COMPREHEN METABOLIC PANEL: CPT | Performed by: INTERNAL MEDICINE

## 2024-11-22 PROCEDURE — G2211 COMPLEX E/M VISIT ADD ON: HCPCS | Performed by: INTERNAL MEDICINE

## 2024-11-22 PROCEDURE — 36591 DRAW BLOOD OFF VENOUS DEVICE: CPT | Performed by: INTERNAL MEDICINE

## 2024-11-22 PROCEDURE — 82247 BILIRUBIN TOTAL: CPT | Performed by: INTERNAL MEDICINE

## 2024-11-22 PROCEDURE — 84075 ASSAY ALKALINE PHOSPHATASE: CPT | Performed by: INTERNAL MEDICINE

## 2024-11-22 PROCEDURE — G0463 HOSPITAL OUTPT CLINIC VISIT: HCPCS | Performed by: INTERNAL MEDICINE

## 2024-11-22 RX ORDER — HEPARIN SODIUM (PORCINE) LOCK FLUSH IV SOLN 100 UNIT/ML 100 UNIT/ML
5 SOLUTION INTRAVENOUS ONCE
Status: COMPLETED | OUTPATIENT
Start: 2024-11-22 | End: 2024-11-22

## 2024-11-22 RX ADMIN — HEPARIN SODIUM (PORCINE) LOCK FLUSH IV SOLN 100 UNIT/ML 5 ML: 100 SOLUTION at 14:52

## 2024-11-22 ASSESSMENT — PAIN SCALES - GENERAL: PAINLEVEL_OUTOF10: NO PAIN (0)

## 2024-11-22 NOTE — NURSING NOTE
Chief Complaint   Patient presents with    Oncology Clinic Visit     BMT Return    Port Draw     Labs collected from port by RN. Vitals taken. Checked in for appointment(s).      Port accessed with 20 gauge 3/4 inch flat needle by RN, labs collected, line flushed with saline and heparin.  Vitals taken. Pt checked in for appointment(s).     Usha Sanabria RN

## 2024-11-22 NOTE — TELEPHONE ENCOUNTER
Brief ID Note:    I was called about Nik being in the ED. He has ALL and recently was diagnosed with PJP. He is on bactrim, started 3 days ago.  He is now having shortness of breath, and was found to have rhinovirus. This is circulating at high levels in the community so unsurprising.    His Cr has increased from 1.38 to 1.81.  After starting bactrim, I suspect this is related to the impaired tubular secretion of Cr, and thus an artifactual rise in Cr and not due to true decline in GFR.    I recommend that Nik continue his bactrim dose. He will undergo labs tomorrow and his Cr can be closely followed.  I will notify his ID physician, Dr. Markham, and she can adjust medications as necessary.    Neli Scott MD   of Medicine, Division of Infectious Diseases  Contact me on the [employer] navin or console  [facility] [phone]

## 2024-11-22 NOTE — PROGRESS NOTES
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     Recently with diagnosis of PJP pneumonia. 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    HPI: Nik returns today after a visit to ED yesterday for worsening SOB. He reports that fever has resolved, however, he experiences cough, significant fatigue, malaise for the past 3-4 days. Significant UREÑA with activities such as walking or climbing stairs which was significantly worse yesterday prompting ED visit. Dry eyes and mouth persist, overall stable. Probable ink rash in the back. Denies diarrhea or other skin/joint changes.     ROS: 10 point Review of systems was negative except as detailed above     PHYSICAL EXAM          Blood pressure 136/73, pulse 92, temperature 98  F (36.7  C), resp. rate 16, weight 122.5 kg (270 lb), SpO2 98%.    Wt Readings from Last 4 Encounters:   11/22/24 122.5 kg (270 lb)   11/21/24 117.9 kg (260 lb)   11/12/24 115.7 kg (255 lb)   10/29/24 117.9 kg (260 lb)      KPS: 70  General: NAD.  HEENT: sclera anicteric. Lichenoid changes and associated erythema of b/l buccal mucosa.  No active ulceration.  Lymph nodes: No lymphadenopathy in his head neck region, upper chest, or axilla  Lungs: CTA b/l  no wheezes  CV: RRR  no gallop  Abd: Soft, no tenderness to palpation  Ext/Skin: Chronic skin GVHD change at back. Faint scattered erythema lesion. Xerosis.          Chronic GVHD          10/27/2024    09:21   Chronic GVHD   Has chronic ckfir-zxawkj-ownh disease developed since the last entry? No   Is there persistence of chronic tzusr-ljcrph-xndn disease since the last entry? Yes   Chronic Qxygs-Rvovib-Xmtg Disease Global Severity Score Moderate   Total Chronic Rjtxk-Vdtieu-Wgqy Disease Score 3   Subjective Clinician Opinion of Severity Mild       Blood Counts     Recent Labs   Lab Test 11/22/24  1451 11/21/24  1555 11/18/24  1532   HGB  11.6* 11.8* 12.1*   HCT 32.5* 35.0* 35.9*   WBC 11.4* 10.1 11.6*   ANEUTAUTO 7.4 6.9 8.0   ALYMPAUTO 3.3 2.6 2.9   AMONOAUTO 0.5 0.5 0.5   AEOSAUTO 0.0 0.0 0.0   ABSBASO 0.0 0.0 0.0   NRBCMAN 0.0 0.0 0.0    231 259       Chemistries     Basic Panel  Recent Labs   Lab Test 11/22/24  1451 11/21/24  1555 11/18/24  1532   * 132* 139   POTASSIUM 5.5* 4.7 4.8   CHLORIDE 100 100 103   CO2 20* 19* 24   BUN 40.0* 38.8* 30.5*   CR 1.82* 1.81* 1.38*   * 169* 112*      Calcium, Magnesium, Phosphorus  Recent Labs   Lab Test 11/22/24  1451 11/21/24  1555 11/18/24  1532 10/25/24  1303 08/05/24  0938 07/09/24  1250 02/27/24  1323 02/20/24  1507 11/27/23  0753 11/14/23  1234 11/14/23  1233 10/10/23  1143 08/15/23  1316   DAMON 10.0 9.9 9.8 10.1   < > 9.7   < > 10.1   < > 10.1   < > 10.2 10.0   MAG  --   --   --   --   --   --   --   --   --  2.0  --  1.9 2.0   PHOS  --   --   --  2.9  --  3.4  --  3.4   < > 3.6  --   --   --     < > = values in this interval not displayed.      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     1. Acute lymphoblastic leukemia, [place] chromosome positive. S/p VERÓNICA allo sib PBSCTHas not received TKI maintenance due to active GVHD  Most recent restaging at 2Y consistent with MRD negative CR. BM % donor. FISH No evidence of BCR::ABL1 fusion. CG No recipient (male) cells or cells with a t(9;22) or other clonal chromosomal abnormality were detected among the 20 metaphases analyzed.      2. HEME:   Counts recovered, transfusion independent.    Anemia of chronic disease  Fluctuated during prior infection. Periodically monitor ferritin.     3. GVHD  Skin GVHD- history of  biopsy proven GVHD of the skin 8/30/2021; resolved.   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     Ocular GVHD - NIH score 1  Follows with ophthalmology, last visit 10/11/2024.  - Currently using scleral lenses and PF AT. Not on topical steroids now.     Oral GVHD - NIH score 1  Lichenoid change of L buccal mucosa. Adequate pain control.  - Dexamethasone s/s QID    Systemic treatment for GVHD  Corticosteroids - On steroid chronically due to frequent flare of GVH with taper. Recently with GVHD flare while tapered down to 10 mg daily, most recently adjusted to 15 mg daily which is his current dose.  - Continue prednisone 15 mg daily. Will consider slow taper, +\- steroid sparing agents if symptoms stable at next visit.    GVHD history  cGVHD therapy started on February 24 2022  - Baseline NIH scoring 2/24/2022 : Skin 2 maculopapular rash/erythema with no sclerotic features, mouth score 1 mild symptoms with lichenoid changes less than 25%, eyes score 2 moderate symptoms without new visual impairments due to KCS requiring lubricant eyedrops more than 3 times a day, overall mild scale for her provider     Previous therapies  Tacrolimus - Experienced mild NEGRA, hyperkalemia, hypomagnesemia, and reports some tremors and cramps. Switched to sirolimus Sep 2022.  Sirolimus - Discontinued Oct 2022 due to GVH flare and significant fluid retention and proteinuria.  Belumosudil - Patient intolerant/with disease flares on tacrolimus and sirolimus as above. This was started on Oct 2022 - skin/liver/oral GVHD inactive, and occular symptoms controlled/symptomatic improvement. Discontinued Oct 2023 due to recurrent infections.    4. ID  PJP pneumonia  Recent history of persistent fever with patchy peribronchovascular groundglass opacities on CT (10/21/2024). Extensive work-up revealed PJP positive from BAL (11/1/2024)  - Continue high-dose bactrim per ID recommendation (11/18/2024 - , plan for 21 days)  - Recommend folic acid  supplement while on high-dose bactrim    Rhonovirus infection  Tested positive on 11/18/2024 after he developed fatigue, malaise, URI symptoms.    Prophylaxis   - ACV Patient developed oral herpes reactivation after stopping acyclovir in Jan 2023  - Penicillin 250 mg BID due to atrophic spleen  Hypogammaglobulinemia with recurrent infections: Maintain IgG > 400    Vaccinations  Completed 1Y and 2Y vaccination series.  Received his influenza/COVID vaccine 9/24/2024 and RSV vaccine 8/13.   Complete pneumococcal and HiB vaccine. Recommend meningococcal vaccine once infection resolved.    5. Renal  History of RCC s/p R nephrectomy in 2007.    Elevated Cr  Hyponatremia and hyperkalemia  Elevated Cr at fro baseline around 1.2 with mild hyponatremia and mild hyperkalemia secondary to Bactrim. Given overall stability of Cr, more likely related to decreased Cr excretion from bactrim than actual kidney injury.  - Closely monitor renal function and electrolyte    6. Endo:   - Hx of graves disease; On synthroid follows with endocrine  - HLD; On rosuvastatin    7. CV:   - Hypertension; On amlodipine    8. Psych:   - Situational anxiety; Lexapro 10mg daily.     Plan:  - Continue prednisone 15 mg daily  - Continue high-dose bactrim per ID recommendation (11/18/2024 -, plan for 21 days)  - Recommend folic acid supplement while on high-dose bactrim  - Continue prophylactic ACV and Penicillin   - Lab appointment on Monday 11/25 then at [facility] weekly. RTC with Dr. Cote at around 1 month.    Patient was seen and plan of care was discussed with attending physician Dr. Cote.    Rohan Joshi MD  Hematology/Medical Oncology/BMT (PGY-6)  P: [phone]

## 2024-11-22 NOTE — DISCHARGE INSTRUCTIONS
Please continue your Bactrim and have labs drawn tomorrow as planned.  Infectious disease will follow-up with you.  If you develop any worsening symptoms or if you have any further concerns return to the emergency department

## 2024-11-22 NOTE — NURSING NOTE
"Oncology Rooming Note    November 22, 2024 2:57 PM   Nik Nava is a 64 year old male who presents for:    Chief Complaint   Patient presents with    Oncology Clinic Visit     BMT Return    Port Draw     Labs collected from port by RN. Vitals taken. Checked in for appointment(s).      Initial Vitals: /73   Pulse 92   Temp 98  F (36.7  C)   Resp 16   Wt 122.5 kg (270 lb)   SpO2 98%   BMI 35.62 kg/m   Estimated body mass index is 35.62 kg/m  as calculated from the following:    Height as of 11/21/24: 1.854 m (6' 1\").    Weight as of this encounter: 122.5 kg (270 lb). Body surface area is 2.51 meters squared.  No Pain (0) Comment: Data Unavailable   No LMP for male patient.  Allergies reviewed: Yes  Medications reviewed: Yes    Medications: Medication refills not needed today.  Pharmacy name entered into [employer]:    [facility] - [place], [place] - [address] [place], [place] - [address] [place] [address] [phone]  [facility]  [place], [place] - [address] [place] [address]    Frailty Screening:   Is the patient here for a new oncology consult visit in cancer care? 2. No      Clinical concerns: Patient was seen in the emergency room yesterday 11/21/24. Labs were taken. He had a question regarding the chest CT. Reports of a severely atrophic spleen.       Shady Wynne LPN              "

## 2024-11-22 NOTE — LETTER
11/22/2024      Nik Nava  [address] [facility] [id number]      Dear Colleague,    Thank you for referring your patient, Nik Nava, to the [facility] BLOOD AND MARROW TRANSPLANT PROGRAM [place]. Please see a copy of my visit note below.    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     Recently with diagnosis of PJP pneumonia. 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    HPI: Nik returns today after a visit to ED yesterday for worsening SOB. He reports that fever has resolved, however, he experiences cough, significant fatigue, malaise for the past 3-4 days. Significant UREÑA with activities such as walking or climbing stairs which was significantly worse yesterday prompting ED visit. Dry eyes and mouth persist, overall stable. Probable ink rash in the back. Denies diarrhea or other skin/joint changes.     ROS: 10 point Review of systems was negative except as detailed above     PHYSICAL EXAM          Blood pressure 136/73, pulse 92, temperature 98  F (36.7  C), resp. rate 16, weight 122.5 kg (270 lb), SpO2 98%.    Wt Readings from Last 4 Encounters:   11/22/24 122.5 kg (270 lb)   11/21/24 117.9 kg (260 lb)   11/12/24 115.7 kg (255 lb)   10/29/24 117.9 kg (260 lb)      KPS: 70  General: NAD.  HEENT: sclera anicteric. Lichenoid changes and associated erythema of b/l buccal mucosa.  No active ulceration.  Lymph nodes: No lymphadenopathy in his head neck region, upper chest, or axilla  Lungs: CTA b/l  no wheezes  CV: RRR  no gallop  Abd: Soft, no tenderness to palpation  Ext/Skin: Chronic skin GVHD change at back. Faint scattered erythema lesion. Xerosis.          Chronic GVHD          10/27/2024    09:21   Chronic GVHD   Has chronic pcsst-fsxvbs-jpxc disease developed since the last entry? No   Is there persistence of chronic cmqjf-civhgm-ywzn disease since the last  entry? Yes   Chronic Mrtfk-Nnbybo-Lxgg Disease Global Severity Score Moderate   Total Chronic Eamzf-Jqcxjb-Tuxf Disease Score 3   Subjective Clinician Opinion of Severity Mild       Blood Counts     Recent Labs   Lab Test 11/22/24  1451 11/21/24  1555 11/18/24  1532   HGB 11.6* 11.8* 12.1*   HCT 32.5* 35.0* 35.9*   WBC 11.4* 10.1 11.6*   ANEUTAUTO 7.4 6.9 8.0   ALYMPAUTO 3.3 2.6 2.9   AMONOAUTO 0.5 0.5 0.5   AEOSAUTO 0.0 0.0 0.0   ABSBASO 0.0 0.0 0.0   NRBCMAN 0.0 0.0 0.0    231 259       Chemistries     Basic Panel  Recent Labs   Lab Test 11/22/24  1451 11/21/24  1555 11/18/24  1532   * 132* 139   POTASSIUM 5.5* 4.7 4.8   CHLORIDE 100 100 103   CO2 20* 19* 24   BUN 40.0* 38.8* 30.5*   CR 1.82* 1.81* 1.38*   * 169* 112*      Calcium, Magnesium, Phosphorus  Recent Labs   Lab Test 11/22/24  1451 11/21/24  1555 11/18/24  1532 10/25/24  1303 08/05/24  0938 07/09/24  1250 02/27/24  1323 02/20/24  1507 11/27/23  0753 11/14/23  1234 11/14/23  1233 10/10/23  1143 08/15/23  1316   DAMON 10.0 9.9 9.8 10.1   < > 9.7   < > 10.1   < > 10.1   < > 10.2 10.0   MAG  --   --   --   --   --   --   --   --   --  2.0  --  1.9 2.0   PHOS  --   --   --  2.9  --  3.4  --  3.4   < > 3.6  --   --   --     < > = values in this interval not displayed.      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     1. Acute lymphoblastic leukemia, Rozet chromosome positive. S/p VERÓNICA allo sib PBSCTHas not received TKI maintenance due to active GVHD  Most recent restaging at 2Y consistent with MRD negative CR. BM % donor. FISH No evidence of BCR::ABL1 fusion. CG No recipient (male) cells or cells with a t(9;22) or other  clonal chromosomal abnormality were detected among the 20 metaphases analyzed.      2. HEME:   Counts recovered, transfusion independent.    Anemia of chronic disease  Fluctuated during prior infection. Periodically monitor ferritin.     3. 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     Ocular GVHD - NIH score 1  Follows with ophthalmology, last visit 10/11/2024.  - Currently using scleral lenses and PF AT. Not on topical steroids now.     Oral GVHD - NIH score 1  Lichenoid change of L buccal mucosa. Adequate pain control.  - Dexamethasone s/s QID    Systemic treatment for GVHD  Corticosteroids - On steroid chronically due to frequent flare of GVH with taper. Recently with GVHD flare while tapered down to 10 mg daily, most recently adjusted to 15 mg daily which is his current dose.  - Continue prednisone 15 mg daily. Will consider slow taper, +\- steroid sparing agents if symptoms stable at next visit.    GVHD history  cGVHD therapy started on February 24 2022  - Baseline NIH scoring 2/24/2022 : Skin 2 maculopapular rash/erythema with no sclerotic features, mouth score 1 mild symptoms with lichenoid changes less than 25%, eyes score 2 moderate symptoms without new visual impairments due to KCS requiring lubricant eyedrops more than 3 times a day, overall mild scale for her provider     Previous therapies  Tacrolimus - Experienced mild NEGRA, hyperkalemia, hypomagnesemia, and reports some tremors and cramps. Switched to sirolimus Sep 2022.  Sirolimus - Discontinued Oct 2022 due to GVH flare and significant fluid retention and proteinuria.  Belumosudil - Patient intolerant/with disease flares on tacrolimus and sirolimus as above. This was started on Oct 2022 - skin/liver/oral GVHD inactive, and occular symptoms controlled/symptomatic improvement. Discontinued Oct 2023 due to recurrent infections.    4. ID  PJP pneumonia  Recent history  of persistent fever with patchy peribronchovascular groundglass opacities on CT (10/21/2024). Extensive work-up revealed PJP positive from BAL (11/1/2024)  - Continue high-dose bactrim per ID recommendation (11/18/2024 - , plan for 21 days)  - Recommend folic acid supplement while on high-dose bactrim    Rhonovirus infection  Tested positive on 11/18/2024 after he developed fatigue, malaise, URI symptoms.    Prophylaxis   - ACV Patient developed oral herpes reactivation after stopping acyclovir in Jan 2023  - Penicillin 250 mg BID due to atrophic spleen  Hypogammaglobulinemia with recurrent infections: Maintain IgG > 400    Vaccinations  Completed 1Y and 2Y vaccination series.  Received his influenza/COVID vaccine 9/24/2024 and RSV vaccine 8/13.   Complete pneumococcal and HiB vaccine. Recommend meningococcal vaccine once infection resolved.    5. Renal  History of RCC s/p R nephrectomy in 2007.    Elevated Cr  Hyponatremia and hyperkalemia  Elevated Cr at fro baseline around 1.2 with mild hyponatremia and mild hyperkalemia secondary to Bactrim. Given overall stability of Cr, more likely related to decreased Cr excretion from bactrim than actual kidney injury.  - Closely monitor renal function and electrolyte    6. Endo:   - Hx of graves disease; On synthroid follows with endocrine  - HLD; On rosuvastatin    7. CV:   - Hypertension; On amlodipine    8. Psych:   - Situational anxiety; Lexapro 10mg daily.     Plan:  - Continue prednisone 15 mg daily  - Continue high-dose bactrim per ID recommendation (11/18/2024 -, plan for 21 days)  - Recommend folic acid supplement while on high-dose bactrim  - Continue prophylactic ACV and Penicillin   - Lab appointment on Monday 11/25 then at [facility] weekly. RTC with Dr. Cote at around 1 month.    Patient was seen and plan of care was discussed with attending physician Dr. Cote.    Rohan Joshi MD  Hematology/Medical Oncology/BMT (PGY-6)  P: [phone]      Attestation  signed by Dominique Cote MD at 11/25/2024  8:09 AM (Updated):  Attending Note:   I saw this patient with Dr. Connolly and agree with the findings and plan of care as documented in the note above. I personally reviewed the history, salient aspects of the exam, laboratory and imaging as needed.  The key findings are he has a diagnosis of PJP occurring in the setting of cGVHD on chronic prednisone.  He is tolerating the bactrim well so far.  We will following him closely and prioritize lowering of prednisone after recovery from the pneumonia.     The longitudinal plan of care for the diagnosis(es)/condition(s) as documented were addressed during this visit. Due to the added complexity in care, I will continue to support Nik in the subsequent management and with ongoing continuity of care.      DOMINIQUE COTE MD  November 25, 2024                Again, thank you for allowing me to participate in the care of your patient.        Sincerely,        DOMINIQUE COTE MD

## 2024-11-24 ENCOUNTER — MYC REFILL (OUTPATIENT)
Dept: TRANSPLANT | Facility: CLINIC | Age: 64
End: 2024-11-24
Payer: MEDICARE

## 2024-11-24 DIAGNOSIS — E78.2 MIXED HYPERLIPIDEMIA: ICD-10-CM

## 2024-11-25 ENCOUNTER — TELEPHONE (OUTPATIENT)
Dept: TRANSPLANT | Facility: CLINIC | Age: 64
End: 2024-11-25

## 2024-11-25 ENCOUNTER — HOSPITAL ENCOUNTER (INPATIENT)
Facility: CLINIC | Age: 64
LOS: 3 days | Discharge: HOME | DRG: 682 | End: 2024-11-28
Attending: EMERGENCY MEDICINE | Admitting: HOSPITALIST
Payer: MEDICARE

## 2024-11-25 ENCOUNTER — LAB (OUTPATIENT)
Dept: LAB | Facility: CLINIC | Age: 64
End: 2024-11-25
Payer: MEDICARE

## 2024-11-25 ENCOUNTER — TELEPHONE (OUTPATIENT)
Dept: INFECTIOUS DISEASES | Facility: CLINIC | Age: 64
End: 2024-11-25

## 2024-11-25 DIAGNOSIS — D89.813 GVHD AS COMPLICATION OF BONE MARROW TRANSPLANT (H): ICD-10-CM

## 2024-11-25 DIAGNOSIS — N18.2 CKD (CHRONIC KIDNEY DISEASE) STAGE 2, GFR 60-89 ML/MIN: ICD-10-CM

## 2024-11-25 DIAGNOSIS — R11.0 NAUSEA: ICD-10-CM

## 2024-11-25 DIAGNOSIS — N17.9 AKI (ACUTE KIDNEY INJURY) (H): Primary | ICD-10-CM

## 2024-11-25 DIAGNOSIS — B59 PNEUMONIA DUE TO PNEUMOCYSTIS JIROVECII, UNSPECIFIED LATERALITY, UNSPECIFIED PART OF LUNG (H): ICD-10-CM

## 2024-11-25 DIAGNOSIS — E87.5 HYPERKALEMIA: ICD-10-CM

## 2024-11-25 DIAGNOSIS — T86.09 GVHD AS COMPLICATION OF BONE MARROW TRANSPLANT (H): ICD-10-CM

## 2024-11-25 DIAGNOSIS — Z91.89 AT RISK FOR HEPATOTOXICITY: ICD-10-CM

## 2024-11-25 DIAGNOSIS — Z94.81 S/P BONE MARROW TRANSPLANT (H): ICD-10-CM

## 2024-11-25 DIAGNOSIS — R06.02 SHORTNESS OF BREATH: ICD-10-CM

## 2024-11-25 LAB
ALBUMIN SERPL BCG-MCNC: 4.4 G/DL (ref 3.5–5.2)
ALBUMIN SERPL BCG-MCNC: 4.6 G/DL (ref 3.5–5.2)
ALP SERPL-CCNC: 102 U/L (ref 40–150)
ALP SERPL-CCNC: 106 U/L (ref 40–150)
ALT SERPL W P-5'-P-CCNC: 40 U/L (ref 0–70)
ALT SERPL W P-5'-P-CCNC: 41 U/L (ref 0–70)
ANION GAP SERPL CALCULATED.3IONS-SCNC: 14 MMOL/L (ref 7–15)
ANION GAP SERPL CALCULATED.3IONS-SCNC: 16 MMOL/L (ref 7–15)
AST SERPL W P-5'-P-CCNC: 40 U/L (ref 0–45)
AST SERPL W P-5'-P-CCNC: 42 U/L (ref 0–45)
BASOPHILS # BLD AUTO: 0 10E3/UL (ref 0–0.2)
BASOPHILS # BLD AUTO: 0 10E3/UL (ref 0–0.2)
BASOPHILS NFR BLD AUTO: 0 %
BASOPHILS NFR BLD AUTO: 0 %
BILIRUB SERPL-MCNC: 0.2 MG/DL
BILIRUB SERPL-MCNC: 0.2 MG/DL
BUN SERPL-MCNC: 37 MG/DL (ref 8–23)
BUN SERPL-MCNC: 40.9 MG/DL (ref 8–23)
CALCIUM SERPL-MCNC: 10.9 MG/DL (ref 8.8–10.4)
CALCIUM SERPL-MCNC: 11.4 MG/DL (ref 8.8–10.4)
CHLORIDE SERPL-SCNC: 91 MMOL/L (ref 98–107)
CHLORIDE SERPL-SCNC: 93 MMOL/L (ref 98–107)
CREAT SERPL-MCNC: 1.83 MG/DL (ref 0.67–1.17)
CREAT SERPL-MCNC: 1.87 MG/DL (ref 0.67–1.17)
EGFRCR SERPLBLD CKD-EPI 2021: 39 ML/MIN/1.73M2
EGFRCR SERPLBLD CKD-EPI 2021: 40 ML/MIN/1.73M2
EOSINOPHIL # BLD AUTO: 0 10E3/UL (ref 0–0.7)
EOSINOPHIL # BLD AUTO: 0 10E3/UL (ref 0–0.7)
EOSINOPHIL NFR BLD AUTO: 0 %
EOSINOPHIL NFR BLD AUTO: 0 %
ERYTHROCYTE [DISTWIDTH] IN BLOOD BY AUTOMATED COUNT: 16.2 % (ref 10–15)
ERYTHROCYTE [DISTWIDTH] IN BLOOD BY AUTOMATED COUNT: 16.4 % (ref 10–15)
GLUCOSE BLDC GLUCOMTR-MCNC: 103 MG/DL (ref 70–99)
GLUCOSE BLDC GLUCOMTR-MCNC: 113 MG/DL (ref 70–99)
GLUCOSE BLDC GLUCOMTR-MCNC: 115 MG/DL (ref 70–99)
GLUCOSE BLDC GLUCOMTR-MCNC: 184 MG/DL (ref 70–99)
GLUCOSE BLDC GLUCOMTR-MCNC: 58 MG/DL (ref 70–99)
GLUCOSE BLDC GLUCOMTR-MCNC: 98 MG/DL (ref 70–99)
GLUCOSE BLDC GLUCOMTR-MCNC: 98 MG/DL (ref 70–99)
GLUCOSE SERPL-MCNC: 108 MG/DL (ref 70–99)
GLUCOSE SERPL-MCNC: 130 MG/DL (ref 70–99)
HCO3 SERPL-SCNC: 18 MMOL/L (ref 22–29)
HCO3 SERPL-SCNC: 20 MMOL/L (ref 22–29)
HCT VFR BLD AUTO: 35.5 % (ref 40–53)
HCT VFR BLD AUTO: 36.3 % (ref 40–53)
HGB BLD-MCNC: 12.3 G/DL (ref 13.3–17.7)
HGB BLD-MCNC: 12.6 G/DL (ref 13.3–17.7)
IMM GRANULOCYTES # BLD: 0.1 10E3/UL
IMM GRANULOCYTES # BLD: 0.1 10E3/UL
IMM GRANULOCYTES NFR BLD: 1 %
IMM GRANULOCYTES NFR BLD: 1 %
LYMPHOCYTES # BLD AUTO: 1.9 10E3/UL (ref 0.8–5.3)
LYMPHOCYTES # BLD AUTO: 3.7 10E3/UL (ref 0.8–5.3)
LYMPHOCYTES NFR BLD AUTO: 27 %
LYMPHOCYTES NFR BLD AUTO: 41 %
MAGNESIUM SERPL-MCNC: 2.2 MG/DL (ref 1.7–2.3)
MCH RBC QN AUTO: 32.7 PG (ref 26.5–33)
MCH RBC QN AUTO: 32.9 PG (ref 26.5–33)
MCHC RBC AUTO-ENTMCNC: 34.6 G/DL (ref 31.5–36.5)
MCHC RBC AUTO-ENTMCNC: 34.7 G/DL (ref 31.5–36.5)
MCV RBC AUTO: 94 FL (ref 78–100)
MCV RBC AUTO: 95 FL (ref 78–100)
MONOCYTES # BLD AUTO: 0.5 10E3/UL (ref 0–1.3)
MONOCYTES # BLD AUTO: 0.6 10E3/UL (ref 0–1.3)
MONOCYTES NFR BLD AUTO: 6 %
MONOCYTES NFR BLD AUTO: 9 %
NEUTROPHILS # BLD AUTO: 4.5 10E3/UL (ref 1.6–8.3)
NEUTROPHILS # BLD AUTO: 4.6 10E3/UL (ref 1.6–8.3)
NEUTROPHILS NFR BLD AUTO: 51 %
NEUTROPHILS NFR BLD AUTO: 63 %
NRBC # BLD AUTO: 0 10E3/UL
NRBC # BLD AUTO: 0 10E3/UL
NRBC BLD AUTO-RTO: 0 /100
NRBC BLD AUTO-RTO: 0 /100
PLATELET # BLD AUTO: 224 10E3/UL (ref 150–450)
PLATELET # BLD AUTO: 230 10E3/UL (ref 150–450)
POTASSIUM SERPL-SCNC: 4.9 MMOL/L (ref 3.4–5.3)
POTASSIUM SERPL-SCNC: 5 MMOL/L (ref 3.4–5.3)
POTASSIUM SERPL-SCNC: 5.9 MMOL/L (ref 3.4–5.3)
POTASSIUM SERPL-SCNC: 6.2 MMOL/L (ref 3.4–5.3)
PROT SERPL-MCNC: 7.3 G/DL (ref 6.4–8.3)
PROT SERPL-MCNC: 7.6 G/DL (ref 6.4–8.3)
RBC # BLD AUTO: 3.74 10E6/UL (ref 4.4–5.9)
RBC # BLD AUTO: 3.85 10E6/UL (ref 4.4–5.9)
SODIUM SERPL-SCNC: 125 MMOL/L (ref 135–145)
SODIUM SERPL-SCNC: 127 MMOL/L (ref 135–145)
WBC # BLD AUTO: 7.1 10E3/UL (ref 4–11)
WBC # BLD AUTO: 9 10E3/UL (ref 4–11)

## 2024-11-25 PROCEDURE — 99223 1ST HOSP IP/OBS HIGH 75: CPT | Mod: AI | Performed by: STUDENT IN AN ORGANIZED HEALTH CARE EDUCATION/TRAINING PROGRAM

## 2024-11-25 PROCEDURE — 82310 ASSAY OF CALCIUM: CPT | Performed by: EMERGENCY MEDICINE

## 2024-11-25 PROCEDURE — 84155 ASSAY OF PROTEIN SERUM: CPT | Performed by: EMERGENCY MEDICINE

## 2024-11-25 PROCEDURE — 36415 COLL VENOUS BLD VENIPUNCTURE: CPT | Performed by: EMERGENCY MEDICINE

## 2024-11-25 PROCEDURE — 85014 HEMATOCRIT: CPT | Performed by: EMERGENCY MEDICINE

## 2024-11-25 PROCEDURE — 85025 COMPLETE CBC W/AUTO DIFF WBC: CPT

## 2024-11-25 PROCEDURE — 250N000012 HC RX MED GY IP 250 OP 636 PS 637: Performed by: EMERGENCY MEDICINE

## 2024-11-25 PROCEDURE — 36415 COLL VENOUS BLD VENIPUNCTURE: CPT

## 2024-11-25 PROCEDURE — 83735 ASSAY OF MAGNESIUM: CPT | Performed by: EMERGENCY MEDICINE

## 2024-11-25 PROCEDURE — 93005 ELECTROCARDIOGRAM TRACING: CPT | Performed by: EMERGENCY MEDICINE

## 2024-11-25 PROCEDURE — 99285 EMERGENCY DEPT VISIT HI MDM: CPT | Performed by: EMERGENCY MEDICINE

## 2024-11-25 PROCEDURE — 85025 COMPLETE CBC W/AUTO DIFF WBC: CPT | Performed by: EMERGENCY MEDICINE

## 2024-11-25 PROCEDURE — 82565 ASSAY OF CREATININE: CPT | Performed by: PHYSICIAN ASSISTANT

## 2024-11-25 PROCEDURE — 84132 ASSAY OF SERUM POTASSIUM: CPT | Performed by: EMERGENCY MEDICINE

## 2024-11-25 PROCEDURE — 99285 EMERGENCY DEPT VISIT HI MDM: CPT | Mod: 25 | Performed by: EMERGENCY MEDICINE

## 2024-11-25 PROCEDURE — 250N000011 HC RX IP 250 OP 636: Performed by: PHYSICIAN ASSISTANT

## 2024-11-25 PROCEDURE — 250N000011 HC RX IP 250 OP 636: Mod: JZ | Performed by: EMERGENCY MEDICINE

## 2024-11-25 PROCEDURE — 93010 ELECTROCARDIOGRAM REPORT: CPT | Performed by: EMERGENCY MEDICINE

## 2024-11-25 PROCEDURE — 96360 HYDRATION IV INFUSION INIT: CPT | Performed by: EMERGENCY MEDICINE

## 2024-11-25 PROCEDURE — 120N000005 HC R&B MS OVERFLOW UMMC

## 2024-11-25 PROCEDURE — 258N000001 HC RX 258: Performed by: EMERGENCY MEDICINE

## 2024-11-25 PROCEDURE — 82962 GLUCOSE BLOOD TEST: CPT

## 2024-11-25 PROCEDURE — 80053 COMPREHEN METABOLIC PANEL: CPT

## 2024-11-25 PROCEDURE — 99207 PR APP CREDIT; MD BILLING SHARED VISIT: CPT | Mod: FS | Performed by: PHYSICIAN ASSISTANT

## 2024-11-25 PROCEDURE — 258N000003 HC RX IP 258 OP 636: Performed by: EMERGENCY MEDICINE

## 2024-11-25 PROCEDURE — 250N000013 HC RX MED GY IP 250 OP 250 PS 637: Performed by: PHYSICIAN ASSISTANT

## 2024-11-25 PROCEDURE — 250N000013 HC RX MED GY IP 250 OP 250 PS 637: Performed by: EMERGENCY MEDICINE

## 2024-11-25 RX ORDER — DEXTROSE MONOHYDRATE 25 G/50ML
25 INJECTION, SOLUTION INTRAVENOUS ONCE
Status: COMPLETED | OUTPATIENT
Start: 2024-11-25 | End: 2024-11-25

## 2024-11-25 RX ORDER — FUROSEMIDE 10 MG/ML
20 INJECTION INTRAMUSCULAR; INTRAVENOUS ONCE
Status: COMPLETED | OUTPATIENT
Start: 2024-11-25 | End: 2024-11-25

## 2024-11-25 RX ORDER — ESCITALOPRAM OXALATE 10 MG/1
10 TABLET ORAL AT BEDTIME
Status: DISCONTINUED | OUTPATIENT
Start: 2024-11-25 | End: 2024-11-28 | Stop reason: HOSPADM

## 2024-11-25 RX ORDER — NICOTINE POLACRILEX 4 MG
15-30 LOZENGE BUCCAL
Status: DISCONTINUED | OUTPATIENT
Start: 2024-11-25 | End: 2024-11-28 | Stop reason: HOSPADM

## 2024-11-25 RX ORDER — LIDOCAINE 40 MG/G
CREAM TOPICAL
Status: DISCONTINUED | OUTPATIENT
Start: 2024-11-25 | End: 2024-11-28 | Stop reason: HOSPADM

## 2024-11-25 RX ORDER — ACYCLOVIR 800 MG/1
800 TABLET ORAL EVERY 12 HOURS
Status: DISCONTINUED | OUTPATIENT
Start: 2024-11-25 | End: 2024-11-26

## 2024-11-25 RX ORDER — PENICILLIN V POTASSIUM 250 MG/1
250 TABLET, FILM COATED ORAL 2 TIMES DAILY
Status: DISCONTINUED | OUTPATIENT
Start: 2024-11-25 | End: 2024-11-28 | Stop reason: HOSPADM

## 2024-11-25 RX ORDER — DEXTROSE MONOHYDRATE 25 G/50ML
25-50 INJECTION, SOLUTION INTRAVENOUS
Status: DISCONTINUED | OUTPATIENT
Start: 2024-11-25 | End: 2024-11-28 | Stop reason: HOSPADM

## 2024-11-25 RX ORDER — AMOXICILLIN 250 MG
1 CAPSULE ORAL 2 TIMES DAILY PRN
Status: DISCONTINUED | OUTPATIENT
Start: 2024-11-25 | End: 2024-11-28 | Stop reason: HOSPADM

## 2024-11-25 RX ORDER — SODIUM CHLORIDE FOR INHALATION 0.9 %
3 VIAL, NEBULIZER (ML) INHALATION DAILY
Status: DISCONTINUED | OUTPATIENT
Start: 2024-11-26 | End: 2024-11-26

## 2024-11-25 RX ORDER — ROSUVASTATIN CALCIUM 5 MG/1
5 TABLET, COATED ORAL AT BEDTIME
Status: DISCONTINUED | OUTPATIENT
Start: 2024-11-25 | End: 2024-11-28 | Stop reason: HOSPADM

## 2024-11-25 RX ORDER — LEVOTHYROXINE SODIUM 112 UG/1
112 TABLET ORAL DAILY
Status: DISCONTINUED | OUTPATIENT
Start: 2024-11-26 | End: 2024-11-28 | Stop reason: HOSPADM

## 2024-11-25 RX ORDER — TACROLIMUS 1 MG/G
OINTMENT TOPICAL 2 TIMES DAILY
Status: DISCONTINUED | OUTPATIENT
Start: 2024-11-25 | End: 2024-11-26

## 2024-11-25 RX ORDER — ONDANSETRON 4 MG/1
4 TABLET, ORALLY DISINTEGRATING ORAL EVERY 6 HOURS PRN
Status: DISCONTINUED | OUTPATIENT
Start: 2024-11-25 | End: 2024-11-28 | Stop reason: HOSPADM

## 2024-11-25 RX ORDER — ENOXAPARIN SODIUM 100 MG/ML
40 INJECTION SUBCUTANEOUS EVERY 24 HOURS
Status: DISCONTINUED | OUTPATIENT
Start: 2024-11-25 | End: 2024-11-28 | Stop reason: HOSPADM

## 2024-11-25 RX ORDER — ATOVAQUONE 750 MG/5ML
750 SUSPENSION ORAL 2 TIMES DAILY
Status: DISCONTINUED | OUTPATIENT
Start: 2024-11-25 | End: 2024-11-28 | Stop reason: HOSPADM

## 2024-11-25 RX ORDER — LORAZEPAM 1 MG/1
1 TABLET ORAL EVERY 6 HOURS PRN
Status: DISCONTINUED | OUTPATIENT
Start: 2024-11-25 | End: 2024-11-28 | Stop reason: HOSPADM

## 2024-11-25 RX ORDER — ACETAMINOPHEN 325 MG/1
650 TABLET ORAL EVERY 6 HOURS PRN
Status: DISCONTINUED | OUTPATIENT
Start: 2024-11-25 | End: 2024-11-28 | Stop reason: HOSPADM

## 2024-11-25 RX ORDER — SENNOSIDES 8.6 MG
1 TABLET ORAL AT BEDTIME
Status: DISCONTINUED | OUTPATIENT
Start: 2024-11-25 | End: 2024-11-28 | Stop reason: HOSPADM

## 2024-11-25 RX ORDER — PANTOPRAZOLE SODIUM 40 MG/1
40 TABLET, DELAYED RELEASE ORAL
Status: DISCONTINUED | OUTPATIENT
Start: 2024-11-26 | End: 2024-11-28 | Stop reason: HOSPADM

## 2024-11-25 RX ORDER — CALCIUM GLUCONATE 94 MG/ML
1 INJECTION, SOLUTION INTRAVENOUS ONCE
Status: COMPLETED | OUTPATIENT
Start: 2024-11-25 | End: 2024-11-25

## 2024-11-25 RX ORDER — CALCIUM CARBONATE 500 MG/1
1000 TABLET, CHEWABLE ORAL 4 TIMES DAILY PRN
Status: DISCONTINUED | OUTPATIENT
Start: 2024-11-25 | End: 2024-11-28 | Stop reason: HOSPADM

## 2024-11-25 RX ORDER — CARBOXYMETHYLCELLULOSE SODIUM 5 MG/ML
1 SOLUTION/ DROPS OPHTHALMIC 4 TIMES DAILY
Status: DISCONTINUED | OUTPATIENT
Start: 2024-11-25 | End: 2024-11-28 | Stop reason: HOSPADM

## 2024-11-25 RX ORDER — PREDNISONE 5 MG/1
5 TABLET ORAL DAILY
Status: DISCONTINUED | OUTPATIENT
Start: 2024-11-26 | End: 2024-11-25

## 2024-11-25 RX ORDER — AMLODIPINE BESYLATE 5 MG/1
5 TABLET ORAL AT BEDTIME
Status: DISCONTINUED | OUTPATIENT
Start: 2024-11-25 | End: 2024-11-28 | Stop reason: HOSPADM

## 2024-11-25 RX ORDER — ONDANSETRON 2 MG/ML
4 INJECTION INTRAMUSCULAR; INTRAVENOUS EVERY 6 HOURS PRN
Status: DISCONTINUED | OUTPATIENT
Start: 2024-11-25 | End: 2024-11-28 | Stop reason: HOSPADM

## 2024-11-25 RX ORDER — AMOXICILLIN 250 MG
2 CAPSULE ORAL 2 TIMES DAILY PRN
Status: DISCONTINUED | OUTPATIENT
Start: 2024-11-25 | End: 2024-11-28 | Stop reason: HOSPADM

## 2024-11-25 RX ADMIN — CALCIUM GLUCONATE 1 G: 98 INJECTION, SOLUTION INTRAVENOUS at 19:45

## 2024-11-25 RX ADMIN — ROSUVASTATIN CALCIUM 5 MG: 5 TABLET, FILM COATED ORAL at 22:31

## 2024-11-25 RX ADMIN — ATOVAQUONE 750 MG: 750 SUSPENSION ORAL at 20:44

## 2024-11-25 RX ADMIN — ENOXAPARIN SODIUM 40 MG: 40 INJECTION SUBCUTANEOUS at 22:31

## 2024-11-25 RX ADMIN — SENNOSIDES 1 TABLET: 8.6 TABLET, FILM COATED ORAL at 22:31

## 2024-11-25 RX ADMIN — DEXTROSE MONOHYDRATE 25 G: 25 INJECTION, SOLUTION INTRAVENOUS at 19:12

## 2024-11-25 RX ADMIN — Medication 1 DROP: at 22:31

## 2024-11-25 RX ADMIN — SODIUM CHLORIDE 10 UNITS: 9 INJECTION, SOLUTION INTRAVENOUS at 19:16

## 2024-11-25 RX ADMIN — SODIUM ZIRCONIUM CYCLOSILICATE 10 G: 10 POWDER, FOR SUSPENSION ORAL at 19:25

## 2024-11-25 RX ADMIN — FUROSEMIDE 20 MG: 10 INJECTION, SOLUTION INTRAMUSCULAR; INTRAVENOUS at 19:19

## 2024-11-25 RX ADMIN — DEXTROSE MONOHYDRATE 300 ML: 100 INJECTION, SOLUTION INTRAVENOUS at 20:40

## 2024-11-25 RX ADMIN — AMLODIPINE BESYLATE 5 MG: 5 TABLET ORAL at 22:31

## 2024-11-25 RX ADMIN — SODIUM CHLORIDE 1000 ML: 9 INJECTION, SOLUTION INTRAVENOUS at 18:22

## 2024-11-25 RX ADMIN — ACYCLOVIR 800 MG: 800 TABLET ORAL at 22:31

## 2024-11-25 RX ADMIN — ESCITALOPRAM OXALATE 10 MG: 10 TABLET ORAL at 22:31

## 2024-11-25 ASSESSMENT — ACTIVITIES OF DAILY LIVING (ADL)
ADLS_ACUITY_SCORE: 48
ADLS_ACUITY_SCORE: 28
ADLS_ACUITY_SCORE: 48

## 2024-11-25 NOTE — ED PROVIDER NOTES
[place] EMERGENCY DEPARTMENT ([facility])    11/25/24       ED PROVIDER NOTE    History     Chief Complaint   Patient presents with    Abnormal Labs     The history is provided by the patient and medical records.     Nik Nava is a 64 year old male with a history of ALL s/p allo PBSC on 8/10/2021 who is presenting with hyperkalemia.  He was started on Bactrim 11 days ago for pneumocystis.  His creatinine was rising slightly last week, he is now hyperkalemic.  He had some nausea overnight and some jitteriness.  No pain or fevers.  Some ongoing shortness of breath that is largely unchanged.  No chest pain.  No swelling of the legs.  He still urinating fairly normally.    Past Medical History  Past Medical History:   Diagnosis Date    ALL (acute lymphocytic leukemia) (H)     CKD (chronic kidney disease)     GVHD (graft versus host disease) (H)     H/O peripheral stem cell transplant (H)     Hyperthyroidism 1996    Infection due to 2019 novel coronavirus 01/16/2023    Influenza A 11/2022    Postablative hypothyroidism 1997    Pulmonary embolism (H)     Renal cell carcinoma (H) 2007    right kidney    Sleep apnea      Past Surgical History:   Procedure Laterality Date    APPENDECTOMY      BACK SURGERY  2017    BONE MARROW BIOPSY, BONE SPECIMEN, NEEDLE/TROCAR Left 09/02/2021    Procedure: BIOPSY, BONE MARROW;  Surgeon: Jailyn Ny APRN CNP;  Location: [facility] OR    BONE MARROW BIOPSY, BONE SPECIMEN, NEEDLE/TROCAR Left 11/15/2021    Procedure: BIOPSY, BONE MARROW;  Surgeon: Socrates De La Torre;  Location: [facility] OR    BONE MARROW BIOPSY, BONE SPECIMEN, NEEDLE/TROCAR Left 02/07/2022    Procedure: BIOPSY, BONE MARROW;  Surgeon: Renetta Wiggins PA-C;  Location: [facility] OR    BONE MARROW BIOPSY, BONE SPECIMEN, NEEDLE/TROCAR Left 08/18/2022    Procedure: BIOPSY, BONE MARROW;  Surgeon: Lorrie Yap PA-C;  Location: [facility] OR    BONE MARROW BIOPSY, BONE SPECIMEN, NEEDLE/TROCAR Left 08/07/2023     Procedure: Bone marrow biopsy, bone specimen, needle/trocar;  Surgeon: Teressa Rick PA-C;  Location: [facility] OR    BRONCHOSCOPY (RIGID OR FLEXIBLE), DIAGNOSTIC N/A 04/29/2022    Procedure: BRONCHOSCOPY, WITH BRONCHOALVEOLAR LAVAGE;  Surgeon: Murtaza Garcia MD;  Location: [facility] GI    BRONCHOSCOPY (RIGID OR FLEXIBLE), DIAGNOSTIC N/A 11/1/2024    Procedure: BRONCHOSCOPY, WITH BRONCHOALVEOLAR LAVAGE;  Surgeon: Murtaza Garcia MD;  Location: [facility]    IR CVC TUNNEL PLACEMENT > 5 YRS OF AGE  08/04/2021    IR CVC TUNNEL REMOVAL LEFT  09/02/2021    IR LIVER BIOPSY PERCUTANEOUS  02/21/2022    NEPHRECTOMY  2007    ORTHOPEDIC SURGERY      bilateral shoulder surgeries    PERCUTANEOUS BIOPSY LIVER N/A 02/21/2022    Procedure: NEEDLE BIOPSY, LIVER, PERCUTANEOUS;  Surgeon: Trace Castellanos MD;  Location: [facility] OR    PHACOEMULSIFICATION WITH STANDARD INTRAOCULAR LENS IMPLANT Left 1/15/2024    Procedure: LEFT EYE PHACOEMULSIFICATION, CATARACT, WITH STANDARD INTRAOCULAR LENS IMPLANT INSERTION;  Surgeon: Deshawn Menezes MD;  Location: [facility] OR    PHACOEMULSIFICATION WITH STANDARD INTRAOCULAR LENS IMPLANT Right 2/5/2024    Procedure: RIGHT EYE PHACOEMULSIFICATION, CATARACT, WITH STANDARD INTRAOCULAR LENS IMPLANT INSERTION;  Surgeon: Deshawn Menezes MD;  Location: [facility] OR     acyclovir (ZOVIRAX) 400 MG tablet  amLODIPine (NORVASC) 5 MG tablet  Carboxymethylcell-Glycerin PF (REFRESH RELIEVA PF) 0.5-1 % SOLN  dexAMETHasone alcohol-free (DECADRON) 0.1 MG/ML solution  doxycycline hyclate (VIBRAMYCIN) 100 MG capsule  escitalopram (LEXAPRO) 10 MG tablet  fluorometholone (FML LIQUIFILM) 0.1 % ophthalmic suspension  levothyroxine (SYNTHROID/LEVOTHROID) 112 MCG tablet  LORazepam (ATIVAN) 1 MG tablet  metroNIDAZOLE (METROCREAM) 0.75 % external cream  nicotine polacrilex (NICORETTE) 4 MG gum  Nutritional Supplements (ENSURE COMPLETE SHAKE) LIQD  omeprazole (PRILOSEC) 40 MG DR capsule  penicillin V (VEETID) 250 MG  tablet  predniSONE (DELTASONE) 10 MG tablet  predniSONE (DELTASONE) 5 MG tablet  rosuvastatin (CRESTOR) 5 MG tablet  sennosides (SENOKOT) 8.6 MG tablet  sodium chloride 0.9 % neb solution  sulfamethoxazole-trimethoprim (BACTRIM DS) 800-160 MG tablet  sulfamethoxazole-trimethoprim (BACTRIM DS) 800-160 MG tablet  tacrolimus (PROTOPIC) 0.1 % external ointment      Allergies   Allergen Reactions    Sulfamethoxazole-Trimethoprim Rash     Family History  Family History   Problem Relation Age of Onset    Lung Cancer Mother     Depression Mother     Polycythemia Father     Anesthesia Reaction Father         slow to awaken    Coronary Artery Disease Maternal Grandmother     Diabetes Maternal Grandmother     Hypertension Maternal Grandmother     Hypothyroidism Sister     Glaucoma No family hx of     Macular Degeneration No family hx of     Venous thrombosis No family hx of     Melanoma No family hx of     Skin Cancer No family hx of      Social History   Social History     Tobacco Use    Smoking status: Former     Current packs/day: 0.00     Average packs/day: 2.0 packs/day for 30.0 years (60.0 ttl pk-yrs)     Types: Cigarettes     Start date: 1989     Quit date: 2019     Years since quittin.5     Passive exposure: Past    Smokeless tobacco: Never    Tobacco comments:     DAILY USE OF NICORETTE GUM   Vaping Use    Vaping status: Never Used   Substance Use Topics    Alcohol use: Not Currently    Drug use: Never      Past medical history, past surgical history, medications, allergies, family history, and social history were reviewed with the patient. No additional pertinent items.      ROS: 14 point ROS neg other than the symptoms noted above in the HPI.    Physical Exam   BP: 131/79  Pulse: 98  Temp: 97.7  F (36.5  C)  Resp: 20  SpO2: 97 %    Physical Exam  Physical Exam   Constitutional: oriented to person, place, and time. appears well-developed and well-nourished.   HENT:   Head: Normocephalic and atraumatic.    Neck: Normal range of motion.   Pulmonary/Chest: Effort normal. No respiratory distress.   Cardiac: No murmurs, rubs, gallops. RRR.  Abdominal: Abdomen soft, nontender, nondistended. No rebound tenderness.  MSK: Long bones without deformity or evidence of trauma  Neurological: alert and oriented to person, place, and time.   Skin: Skin is warm and dry.   Psychiatric:  normal mood and affect.  behavior is normal. Thought content normal.     ED Course, Procedures, & Data      Procedures            EKG Interpretation:      Interpreted by Jin Paz MD  Time reviewed: 1750  Symptoms at time of EKG: hyperkalemia   Rhythm: normal sinus   Rate: normal  Axis: normal  Ectopy: none  Conduction: normal  ST Segments/ T Waves: No ST-T wave changes  Q Waves: none  Comparison to prior: Unchanged    Clinical Impression: normal EKG         Results for orders placed or performed during the hospital encounter of 11/25/24   Comprehensive metabolic panel     Status: Abnormal   Result Value Ref Range    Sodium 125 (L) 135 - 145 mmol/L    Potassium 5.9 (H) 3.4 - 5.3 mmol/L    Carbon Dioxide (CO2) 18 (L) 22 - 29 mmol/L    Anion Gap 14 7 - 15 mmol/L    Urea Nitrogen 40.9 (H) 8.0 - 23.0 mg/dL    Creatinine 1.83 (H) 0.67 - 1.17 mg/dL    GFR Estimate 40 (L) >60 mL/min/1.73m2    Calcium 10.9 (H) 8.8 - 10.4 mg/dL    Chloride 93 (L) 98 - 107 mmol/L    Glucose 108 (H) 70 - 99 mg/dL    Alkaline Phosphatase 102 40 - 150 U/L    AST 40 0 - 45 U/L    ALT 40 0 - 70 U/L    Protein Total 7.3 6.4 - 8.3 g/dL    Albumin 4.4 3.5 - 5.2 g/dL    Bilirubin Total 0.2 <=1.2 mg/dL   Magnesium     Status: Normal   Result Value Ref Range    Magnesium 2.2 1.7 - 2.3 mg/dL   CBC with platelets and differential     Status: Abnormal   Result Value Ref Range    WBC Count 7.1 4.0 - 11.0 10e3/uL    RBC Count 3.74 (L) 4.40 - 5.90 10e6/uL    Hemoglobin 12.3 (L) 13.3 - 17.7 g/dL    Hematocrit 35.5 (L) 40.0 - 53.0 %    MCV 95 78 - 100 fL    MCH 32.9 26.5 - 33.0 pg     MCHC 34.6 31.5 - 36.5 g/dL    RDW 16.2 (H) 10.0 - 15.0 %    Platelet Count 230 150 - 450 10e3/uL    % Neutrophils 63 %    % Lymphocytes 27 %    % Monocytes 9 %    % Eosinophils 0 %    % Basophils 0 %    % Immature Granulocytes 1 %    NRBCs per 100 WBC 0 <1 /100    Absolute Neutrophils 4.5 1.6 - 8.3 10e3/uL    Absolute Lymphocytes 1.9 0.8 - 5.3 10e3/uL    Absolute Monocytes 0.6 0.0 - 1.3 10e3/uL    Absolute Eosinophils 0.0 0.0 - 0.7 10e3/uL    Absolute Basophils 0.0 0.0 - 0.2 10e3/uL    Absolute Immature Granulocytes 0.1 <=0.4 10e3/uL    Absolute NRBCs 0.0 10e3/uL   EKG 12-lead, tracing only     Status: None (Preliminary result)   Result Value Ref Range    Systolic Blood Pressure  mmHg    Diastolic Blood Pressure  mmHg    Ventricular Rate 78 BPM    Atrial Rate 78 BPM    VT Interval 160 ms    QRS Duration 88 ms     ms    QTc 444 ms    P Axis 75 degrees    R AXIS 40 degrees    T Axis 75 degrees    Interpretation ECG Sinus rhythm  Normal ECG      CBC with platelets differential     Status: Abnormal    Narrative    The following orders were created for panel order CBC with platelets differential.  Procedure                               Abnormality         Status                     ---------                               -----------         ------                     CBC with platelets and d...[[id number]]  Abnormal            Final result                 Please view results for these tests on the individual orders.   Results for orders placed or performed in visit on 11/25/24   Comprehensive metabolic panel     Status: Abnormal   Result Value Ref Range    Sodium 127 (L) 135 - 145 mmol/L    Potassium 6.2 (HH) 3.4 - 5.3 mmol/L    Carbon Dioxide (CO2) 20 (L) 22 - 29 mmol/L    Anion Gap 16 (H) 7 - 15 mmol/L    Urea Nitrogen 37.0 (H) 8.0 - 23.0 mg/dL    Creatinine 1.87 (H) 0.67 - 1.17 mg/dL    GFR Estimate 39 (L) >60 mL/min/1.73m2    Calcium 11.4 (H) 8.8 - 10.4 mg/dL    Chloride 91 (L) 98 - 107 mmol/L    Glucose 130  (H) 70 - 99 mg/dL    Alkaline Phosphatase 106 40 - 150 U/L    AST 42 0 - 45 U/L    ALT 41 0 - 70 U/L    Protein Total 7.6 6.4 - 8.3 g/dL    Albumin 4.6 3.5 - 5.2 g/dL    Bilirubin Total 0.2 <=1.2 mg/dL    Narrative    Results confirmed by repeat test.     CBC with platelets and differential     Status: Abnormal   Result Value Ref Range    WBC Count 9.0 4.0 - 11.0 10e3/uL    RBC Count 3.85 (L) 4.40 - 5.90 10e6/uL    Hemoglobin 12.6 (L) 13.3 - 17.7 g/dL    Hematocrit 36.3 (L) 40.0 - 53.0 %    MCV 94 78 - 100 fL    MCH 32.7 26.5 - 33.0 pg    MCHC 34.7 31.5 - 36.5 g/dL    RDW 16.4 (H) 10.0 - 15.0 %    Platelet Count 224 150 - 450 10e3/uL    % Neutrophils 51 %    % Lymphocytes 41 %    % Monocytes 6 %    % Eosinophils 0 %    % Basophils 0 %    % Immature Granulocytes 1 %    NRBCs per 100 WBC 0 <1 /100    Absolute Neutrophils 4.6 1.6 - 8.3 10e3/uL    Absolute Lymphocytes 3.7 0.8 - 5.3 10e3/uL    Absolute Monocytes 0.5 0.0 - 1.3 10e3/uL    Absolute Eosinophils 0.0 0.0 - 0.7 10e3/uL    Absolute Basophils 0.0 0.0 - 0.2 10e3/uL    Absolute Immature Granulocytes 0.1 <=0.4 10e3/uL    Absolute NRBCs 0.0 10e3/uL   CBC with platelets differential     Status: Abnormal    Narrative    The following orders were created for panel order CBC with platelets differential.  Procedure                               Abnormality         Status                     ---------                               -----------         ------                     CBC with platelets and d...[[id number]]  Abnormal            Final result                 Please view results for these tests on the individual orders.     Medications   sodium chloride 0.9% BOLUS 1,000 mL (has no administration in time range)     Labs Ordered and Resulted from Time of ED Arrival to Time of ED Departure - No data to display  No orders to display          Critical care was not performed.     Medical Decision Making  The patient's presentation was of high complexity (an acute health  issue posing potential threat to life or bodily function).    The patient's evaluation involved:  review of external note(s) from 1 sources (most recent infectious disease note)  review of 1 test result(s) ordered prior to this encounter (most recent BMP from today)  ordering and/or review of 3+ test(s) in this encounter (see separate area of note for details)  discussion of management or test interpretation with another health professional (disease, hospitalist)    The patient's management necessitated high risk (a decision regarding hospitalization).    Assessment & Plan    Patient here with hyperkalemia likely related to Bactrim.  This will be stopped here.  Discussed with ID who recommended atovaquone 750 mg twice daily which was ordered.  Potassium was shifted.  He is making urine.  Will do a redraw in a few hours.  No other signs of acidosis or fluid overload or need for emergent dialysis at this point.  Patient will be admitted to medicine for further care.    I have reviewed the nursing notes. I have reviewed the findings, diagnosis, plan and need for follow up with the patient.    New Prescriptions    No medications on file       Final diagnoses:   Hyperkalemia       Jin Paz  [facility] EMERGENCY DEPARTMENT  11/25/2024     Jin Paz MD  11/25/24 1911

## 2024-11-25 NOTE — TELEPHONE ENCOUNTER
BMT Nurse Triage    Treating Provider:   Chris Cote    Date of last office visit: 11/22/24    Onset of symptoms: last night noticed some muscle cramping, started throwing up this afternoon    Duration of symptoms: 1 day    Lamar called this afternoon asking about Nik's abnormal labs from visit this afternoon. Has K+ of 6.2 and Na of 127. She is wondering if she should bring him to the ER, which I affirmed. They will head to the [facility] ED for management right away.       Jarad Hampton RN Care Coordinator - BMT  Phone: [phone]

## 2024-11-25 NOTE — TELEPHONE ENCOUNTER
I called Lamar about Nik because of the critical lab of K 6.2 accompanied by hyponatremia, anion gap in the setting of elevated creat and high dose bactrim.  He is vomiting this afternoon.  I advised her to bring him to the ED for fluids and evaluation and not to give further bactrim until the situation is resolved and we reconsider the plan and dosing.      She will bring him to the ED.      DOMINIQUE BRITTON MD  November 25, 2024

## 2024-11-25 NOTE — ED TRIAGE NOTES
Pt arrives ambulatory to triage w/ c/o lightheadedness, fatigue, n/v.   Pt instructed to be seen in ED for abnormal labs. Specifically K+ 6.2 and Na+ 127. Hx bmt, GVHD.

## 2024-11-25 NOTE — TELEPHONE ENCOUNTER
[facility] Call Center    Phone Message    May a detailed message be left on voicemail: yes     Reason for Call: Other: Please call archana back she is waiting on an rx for nausea medication for Nik thank you     Action Taken: Other: ID    Travel Screening: Not Applicable

## 2024-11-25 NOTE — ED TRIAGE NOTES
Triage Assessment (Adult)       Row Name 11/25/24 1726          Triage Assessment    Airway WDL WDL        Respiratory WDL    Respiratory WDL WDL        Skin Circulation/Temperature WDL    Skin Circulation/Temperature WDL WDL        Cardiac WDL    Cardiac WDL WDL        Peripheral/Neurovascular WDL    Peripheral Neurovascular WDL WDL        Cognitive/Neuro/Behavioral WDL    Cognitive/Neuro/Behavioral WDL WDL

## 2024-11-26 LAB
ALBUMIN MFR UR ELPH: 10.3 MG/DL
ALBUMIN UR-MCNC: 30 MG/DL
ANION GAP SERPL CALCULATED.3IONS-SCNC: 12 MMOL/L (ref 7–15)
APPEARANCE UR: ABNORMAL
ATRIAL RATE - MUSE: 78 BPM
BILIRUB UR QL STRIP: NEGATIVE
BUN SERPL-MCNC: 38.6 MG/DL (ref 8–23)
BUN SERPL-MCNC: 41.3 MG/DL (ref 8–23)
BUN SERPL-MCNC: 41.7 MG/DL (ref 8–23)
CALCIUM SERPL-MCNC: 10.1 MG/DL (ref 8.8–10.4)
CALCIUM SERPL-MCNC: 10.3 MG/DL (ref 8.8–10.4)
CALCIUM SERPL-MCNC: 10.5 MG/DL (ref 8.8–10.4)
CHLORIDE SERPL-SCNC: 90 MMOL/L (ref 98–107)
CHLORIDE SERPL-SCNC: 92 MMOL/L (ref 98–107)
CHLORIDE SERPL-SCNC: 92 MMOL/L (ref 98–107)
COLOR UR AUTO: YELLOW
CORTIS SERPL-MCNC: 3.5 UG/DL
CREAT SERPL-MCNC: 1.83 MG/DL (ref 0.67–1.17)
CREAT SERPL-MCNC: 2.08 MG/DL (ref 0.67–1.17)
CREAT SERPL-MCNC: 2.15 MG/DL (ref 0.67–1.17)
CREAT UR-MCNC: 57.1 MG/DL
CYSTATIN C (ROCHE): 1.9 MG/L (ref 0.6–1)
DIASTOLIC BLOOD PRESSURE - MUSE: NORMAL MMHG
EGFRCR SERPLBLD CKD-EPI 2021: 33 ML/MIN/1.73M2
EGFRCR SERPLBLD CKD-EPI 2021: 34 ML/MIN/1.73M2
EGFRCR SERPLBLD CKD-EPI 2021: 40 ML/MIN/1.73M2
ERYTHROCYTE [DISTWIDTH] IN BLOOD BY AUTOMATED COUNT: 15.9 % (ref 10–15)
GFR/BSA.PRED SERPLBLD CYS-BASED-ARV: 32 ML/MIN/1.73M2
GLUCOSE BLDC GLUCOMTR-MCNC: 128 MG/DL (ref 70–99)
GLUCOSE BLDC GLUCOMTR-MCNC: 88 MG/DL (ref 70–99)
GLUCOSE SERPL-MCNC: 106 MG/DL (ref 70–99)
GLUCOSE SERPL-MCNC: 112 MG/DL (ref 70–99)
GLUCOSE SERPL-MCNC: 124 MG/DL (ref 70–99)
GLUCOSE UR STRIP-MCNC: NEGATIVE MG/DL
HCO3 SERPL-SCNC: 20 MMOL/L (ref 22–29)
HCT VFR BLD AUTO: 35 % (ref 40–53)
HGB BLD-MCNC: 12.1 G/DL (ref 13.3–17.7)
HGB UR QL STRIP: NEGATIVE
HYALINE CASTS: 7 /LPF
INTERPRETATION ECG - MUSE: NORMAL
KETONES UR STRIP-MCNC: NEGATIVE MG/DL
LEUKOCYTE ESTERASE UR QL STRIP: NEGATIVE
MAGNESIUM SERPL-MCNC: 2.2 MG/DL (ref 1.7–2.3)
MCH RBC QN AUTO: 32.4 PG (ref 26.5–33)
MCHC RBC AUTO-ENTMCNC: 34.6 G/DL (ref 31.5–36.5)
MCV RBC AUTO: 94 FL (ref 78–100)
MUCOUS THREADS #/AREA URNS LPF: PRESENT /LPF
NITRATE UR QL: NEGATIVE
OSMOLALITY UR: 442 MMOL/KG (ref 100–1200)
P AXIS - MUSE: 75 DEGREES
PH UR STRIP: 6 [PH] (ref 5–7)
PHOSPHATE SERPL-MCNC: 4.2 MG/DL (ref 2.5–4.5)
PLATELET # BLD AUTO: 219 10E3/UL (ref 150–450)
POTASSIUM SERPL-SCNC: 5 MMOL/L (ref 3.4–5.3)
POTASSIUM SERPL-SCNC: 5.3 MMOL/L (ref 3.4–5.3)
POTASSIUM SERPL-SCNC: 5.7 MMOL/L (ref 3.4–5.3)
PR INTERVAL - MUSE: 160 MS
PROT/CREAT 24H UR: 0.18 MG/MG CR (ref 0–0.2)
QRS DURATION - MUSE: 88 MS
QT - MUSE: 390 MS
QTC - MUSE: 444 MS
R AXIS - MUSE: 40 DEGREES
RBC # BLD AUTO: 3.74 10E6/UL (ref 4.4–5.9)
RBC URINE: 1 /HPF
SODIUM SERPL-SCNC: 122 MMOL/L (ref 135–145)
SODIUM SERPL-SCNC: 124 MMOL/L (ref 135–145)
SODIUM SERPL-SCNC: 124 MMOL/L (ref 135–145)
SODIUM UR-SCNC: 102 MMOL/L
SP GR UR STRIP: 1.02 (ref 1–1.03)
SYSTOLIC BLOOD PRESSURE - MUSE: NORMAL MMHG
T AXIS - MUSE: 75 DEGREES
TSH SERPL DL<=0.005 MIU/L-ACNC: 2.4 UIU/ML (ref 0.3–4.2)
UROBILINOGEN UR STRIP-MCNC: NORMAL MG/DL
VENTRICULAR RATE- MUSE: 78 BPM
WBC # BLD AUTO: 7 10E3/UL (ref 4–11)
WBC URINE: 0 /HPF

## 2024-11-26 PROCEDURE — 84443 ASSAY THYROID STIM HORMONE: CPT | Performed by: STUDENT IN AN ORGANIZED HEALTH CARE EDUCATION/TRAINING PROGRAM

## 2024-11-26 PROCEDURE — 120N000005 HC R&B MS OVERFLOW UMMC

## 2024-11-26 PROCEDURE — 82610 CYSTATIN C: CPT | Performed by: STUDENT IN AN ORGANIZED HEALTH CARE EDUCATION/TRAINING PROGRAM

## 2024-11-26 PROCEDURE — 84132 ASSAY OF SERUM POTASSIUM: CPT | Performed by: PHYSICIAN ASSISTANT

## 2024-11-26 PROCEDURE — 85018 HEMOGLOBIN: CPT | Performed by: PHYSICIAN ASSISTANT

## 2024-11-26 PROCEDURE — 93005 ELECTROCARDIOGRAM TRACING: CPT

## 2024-11-26 PROCEDURE — 85048 AUTOMATED LEUKOCYTE COUNT: CPT | Performed by: PHYSICIAN ASSISTANT

## 2024-11-26 PROCEDURE — 82533 TOTAL CORTISOL: CPT | Performed by: STUDENT IN AN ORGANIZED HEALTH CARE EDUCATION/TRAINING PROGRAM

## 2024-11-26 PROCEDURE — 83935 ASSAY OF URINE OSMOLALITY: CPT | Performed by: INTERNAL MEDICINE

## 2024-11-26 PROCEDURE — 999N000157 HC STATISTIC RCP TIME EA 10 MIN

## 2024-11-26 PROCEDURE — 84156 ASSAY OF PROTEIN URINE: CPT | Performed by: STUDENT IN AN ORGANIZED HEALTH CARE EDUCATION/TRAINING PROGRAM

## 2024-11-26 PROCEDURE — 99222 1ST HOSP IP/OBS MODERATE 55: CPT | Mod: GC | Performed by: INTERNAL MEDICINE

## 2024-11-26 PROCEDURE — 99223 1ST HOSP IP/OBS HIGH 75: CPT | Performed by: INTERNAL MEDICINE

## 2024-11-26 PROCEDURE — 250N000013 HC RX MED GY IP 250 OP 250 PS 637: Performed by: PHYSICIAN ASSISTANT

## 2024-11-26 PROCEDURE — 83735 ASSAY OF MAGNESIUM: CPT | Performed by: STUDENT IN AN ORGANIZED HEALTH CARE EDUCATION/TRAINING PROGRAM

## 2024-11-26 PROCEDURE — 99233 SBSQ HOSP IP/OBS HIGH 50: CPT | Performed by: STUDENT IN AN ORGANIZED HEALTH CARE EDUCATION/TRAINING PROGRAM

## 2024-11-26 PROCEDURE — 82435 ASSAY OF BLOOD CHLORIDE: CPT | Performed by: PHYSICIAN ASSISTANT

## 2024-11-26 PROCEDURE — 250N000011 HC RX IP 250 OP 636: Performed by: PHYSICIAN ASSISTANT

## 2024-11-26 PROCEDURE — 81001 URINALYSIS AUTO W/SCOPE: CPT | Performed by: STUDENT IN AN ORGANIZED HEALTH CARE EDUCATION/TRAINING PROGRAM

## 2024-11-26 PROCEDURE — 250N000013 HC RX MED GY IP 250 OP 250 PS 637: Performed by: STUDENT IN AN ORGANIZED HEALTH CARE EDUCATION/TRAINING PROGRAM

## 2024-11-26 PROCEDURE — 80048 BASIC METABOLIC PNL TOTAL CA: CPT | Performed by: PHYSICIAN ASSISTANT

## 2024-11-26 PROCEDURE — 250N000012 HC RX MED GY IP 250 OP 636 PS 637: Performed by: PHYSICIAN ASSISTANT

## 2024-11-26 PROCEDURE — 84300 ASSAY OF URINE SODIUM: CPT | Performed by: INTERNAL MEDICINE

## 2024-11-26 PROCEDURE — 84132 ASSAY OF SERUM POTASSIUM: CPT | Performed by: STUDENT IN AN ORGANIZED HEALTH CARE EDUCATION/TRAINING PROGRAM

## 2024-11-26 PROCEDURE — 250N000011 HC RX IP 250 OP 636: Performed by: STUDENT IN AN ORGANIZED HEALTH CARE EDUCATION/TRAINING PROGRAM

## 2024-11-26 PROCEDURE — 80048 BASIC METABOLIC PNL TOTAL CA: CPT | Performed by: STUDENT IN AN ORGANIZED HEALTH CARE EDUCATION/TRAINING PROGRAM

## 2024-11-26 PROCEDURE — 82565 ASSAY OF CREATININE: CPT | Performed by: STUDENT IN AN ORGANIZED HEALTH CARE EDUCATION/TRAINING PROGRAM

## 2024-11-26 PROCEDURE — 84100 ASSAY OF PHOSPHORUS: CPT | Performed by: STUDENT IN AN ORGANIZED HEALTH CARE EDUCATION/TRAINING PROGRAM

## 2024-11-26 RX ORDER — HEPARIN SODIUM (PORCINE) LOCK FLUSH IV SOLN 100 UNIT/ML 100 UNIT/ML
5-10 SOLUTION INTRAVENOUS
Status: DISCONTINUED | OUTPATIENT
Start: 2024-11-26 | End: 2024-11-28 | Stop reason: HOSPADM

## 2024-11-26 RX ORDER — HEPARIN SODIUM,PORCINE 10 UNIT/ML
5-10 VIAL (ML) INTRAVENOUS
Status: DISCONTINUED | OUTPATIENT
Start: 2024-11-26 | End: 2024-11-28 | Stop reason: HOSPADM

## 2024-11-26 RX ORDER — ACYCLOVIR 400 MG/1
400 TABLET ORAL EVERY 12 HOURS
Status: DISCONTINUED | OUTPATIENT
Start: 2024-11-26 | End: 2024-11-28 | Stop reason: HOSPADM

## 2024-11-26 RX ORDER — HEPARIN SODIUM,PORCINE 10 UNIT/ML
5-10 VIAL (ML) INTRAVENOUS EVERY 24 HOURS
Status: DISCONTINUED | OUTPATIENT
Start: 2024-11-26 | End: 2024-11-28 | Stop reason: HOSPADM

## 2024-11-26 RX ORDER — NICOTINE POLACRILEX 4 MG
15-30 LOZENGE BUCCAL
Status: DISCONTINUED | OUTPATIENT
Start: 2024-11-26 | End: 2024-11-26

## 2024-11-26 RX ORDER — DEXTROSE MONOHYDRATE 25 G/50ML
25-50 INJECTION, SOLUTION INTRAVENOUS
Status: DISCONTINUED | OUTPATIENT
Start: 2024-11-26 | End: 2024-11-26

## 2024-11-26 RX ORDER — DEXTROSE MONOHYDRATE 25 G/50ML
25 INJECTION, SOLUTION INTRAVENOUS ONCE
Status: DISCONTINUED | OUTPATIENT
Start: 2024-11-26 | End: 2024-11-26

## 2024-11-26 RX ADMIN — PANTOPRAZOLE SODIUM 40 MG: 40 TABLET, DELAYED RELEASE ORAL at 15:39

## 2024-11-26 RX ADMIN — ENOXAPARIN SODIUM 40 MG: 40 INJECTION SUBCUTANEOUS at 21:21

## 2024-11-26 RX ADMIN — LORAZEPAM 1 MG: 1 TABLET ORAL at 21:24

## 2024-11-26 RX ADMIN — ROSUVASTATIN CALCIUM 5 MG: 5 TABLET, FILM COATED ORAL at 21:19

## 2024-11-26 RX ADMIN — SENNOSIDES 1 TABLET: 8.6 TABLET, FILM COATED ORAL at 21:20

## 2024-11-26 RX ADMIN — Medication 1 DROP: at 08:34

## 2024-11-26 RX ADMIN — PREDNISONE 15 MG: 10 TABLET ORAL at 08:34

## 2024-11-26 RX ADMIN — Medication 1 DROP: at 15:39

## 2024-11-26 RX ADMIN — ACYCLOVIR 800 MG: 800 TABLET ORAL at 08:34

## 2024-11-26 RX ADMIN — Medication 1 DROP: at 12:56

## 2024-11-26 RX ADMIN — ATOVAQUONE 750 MG: 750 SUSPENSION ORAL at 08:35

## 2024-11-26 RX ADMIN — SODIUM ZIRCONIUM CYCLOSILICATE 10 G: 10 POWDER, FOR SUSPENSION ORAL at 12:56

## 2024-11-26 RX ADMIN — HEPARIN, PORCINE (PF) 10 UNIT/ML INTRAVENOUS SYRINGE 5 ML: at 21:22

## 2024-11-26 RX ADMIN — NICOTINE POLACRILEX 2 MG: 2 GUM, CHEWING BUCCAL at 22:44

## 2024-11-26 RX ADMIN — PENICILLIN V POTASSIUM 250 MG: 250 TABLET, FILM COATED ORAL at 08:34

## 2024-11-26 RX ADMIN — ACYCLOVIR 400 MG: 400 TABLET ORAL at 21:19

## 2024-11-26 RX ADMIN — PENICILLIN V POTASSIUM 250 MG: 250 TABLET, FILM COATED ORAL at 00:16

## 2024-11-26 RX ADMIN — ESCITALOPRAM OXALATE 10 MG: 10 TABLET ORAL at 21:19

## 2024-11-26 RX ADMIN — ATOVAQUONE 750 MG: 750 SUSPENSION ORAL at 22:44

## 2024-11-26 RX ADMIN — NICOTINE POLACRILEX 2 MG: 2 GUM, CHEWING BUCCAL at 17:27

## 2024-11-26 RX ADMIN — LEVOTHYROXINE SODIUM 112 MCG: 0.11 TABLET ORAL at 08:34

## 2024-11-26 RX ADMIN — Medication 1 DROP: at 21:19

## 2024-11-26 RX ADMIN — LORAZEPAM 1 MG: 1 TABLET ORAL at 03:29

## 2024-11-26 RX ADMIN — PANTOPRAZOLE SODIUM 40 MG: 40 TABLET, DELAYED RELEASE ORAL at 08:34

## 2024-11-26 RX ADMIN — SODIUM ZIRCONIUM CYCLOSILICATE 20 G: 10 POWDER, FOR SUSPENSION ORAL at 18:42

## 2024-11-26 RX ADMIN — PENICILLIN V POTASSIUM 250 MG: 250 TABLET, FILM COATED ORAL at 21:20

## 2024-11-26 RX ADMIN — SENNOSIDES AND DOCUSATE SODIUM 2 TABLET: 50; 8.6 TABLET ORAL at 17:27

## 2024-11-26 ASSESSMENT — ACTIVITIES OF DAILY LIVING (ADL)
ADLS_ACUITY_SCORE: 28
ADLS_ACUITY_SCORE: 29
ADLS_ACUITY_SCORE: 30
ADLS_ACUITY_SCORE: 29
ADLS_ACUITY_SCORE: 28
ADLS_ACUITY_SCORE: 28
ADLS_ACUITY_SCORE: 29
ADLS_ACUITY_SCORE: 28
ADLS_ACUITY_SCORE: 29
ADLS_ACUITY_SCORE: 28
ADLS_ACUITY_SCORE: 29

## 2024-11-26 NOTE — PROVIDER NOTIFICATION
Notified provider via [employer] texting Dr. Koehler. Patients lab results are back and his sodium went down to 122 and his K+ went up to 5.7-FYI

## 2024-11-26 NOTE — CONSULTS
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 Bactrim subsequently discontinued and switched to Atovaquone. Most likely explanation for constellation of abnormalities is hyperkalemia and NEGRA secondary to Bactrim, with hyponatremia in the setting of NEGRA. Unlikely for bactrim to be causing this extent of hyponatremia and anticipate Na improving with improvement of NEGRA.   - we held amlodipine in the setting of soft bp   - Repeat BMP (done for you)  - Added TSH with reflex T4, cortisol level  - Close UOP monitoring  - Phos and Mg repletion PRN per primary  -we decreased acyclovir from 800 BID to 400 BID   -Agree with atovaquone instead of bactrim for now     Seen and discussed with Dr. 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 Per chart review, patient began to have intermittent fevers, chills, and dyspnea on exertion on 10/14.  He reports shortness of breath with an up a flight of stairs or ambulating 1 block.  He denies any chest pain and no cough initially.  He was seen outpatient and  treated with a course of doxycycline.  He continued to feel unwell and had extensive workup with BAL of 11/1/2024 which is positive for PJP.  He was seen by infectious disease specialist and started on Bactrim 11/18/2024.  Labs 11/22 revealed elevated creatinine from baseline (1.82), hyponatremia (131) and mild hyperkalemia (5.5).  He reportedly had twitching of the legs, decreased appetite, and vomiting. He had outpatient labs showing trending up with creatinine 1.87 and potassium level 6.2 with downward trending sodium and he was therefore sent to the emergency department.  EKG had no signs of peaked T waves. In ED, he received calcium gluconate 1 g IV, Lasix 20 mg IV, regular insulin 10 units IV, Dextrose 25 g, and Lokelma 10g with improvement in potassium to 5.9.  He feels slightly better. No other complaints at this time.     Currently feels improves, the leg shakiness is much improved. Notes mild leg swelling but this is not new for him. No chest pain, shortness of breath, nausea, and vomitting.     PAST MEDICAL HISTORY:  Reviewed with patient on 11/26/2024     Past Medical History:   Diagnosis Date    ALL (acute lymphocytic leukemia) (H)     CKD (chronic kidney disease)     GVHD (graft versus host disease) (H)     H/O peripheral stem cell transplant (H)     Hyperthyroidism 1996    Infection due to 2019 novel coronavirus 01/16/2023    Influenza A 11/2022    Postablative hypothyroidism 1997    Pulmonary embolism (H)     Renal cell carcinoma (H) 2007    right kidney    Sleep apnea        Past Surgical History:   Procedure Laterality Date    APPENDECTOMY      BACK SURGERY  2017    BONE MARROW BIOPSY, BONE SPECIMEN, NEEDLE/TROCAR Left 09/02/2021    Procedure: BIOPSY, BONE MARROW;  Surgeon: Jailyn Ny APRN CNP;  Location: [facility] OR    BONE MARROW BIOPSY, BONE SPECIMEN, NEEDLE/TROCAR Left 11/15/2021    Procedure: BIOPSY, BONE MARROW;  Surgeon: Socrates De La Torre;  Location: [facility] OR    BONE MARROW BIOPSY,  BONE SPECIMEN, NEEDLE/TROCAR Left 02/07/2022    Procedure: BIOPSY, BONE MARROW;  Surgeon: Renetta Wiggins PA-C;  Location: [facility] OR    BONE MARROW BIOPSY, BONE SPECIMEN, NEEDLE/TROCAR Left 08/18/2022    Procedure: BIOPSY, BONE MARROW;  Surgeon: Lorrie Yap PA-C;  Location: [facility] OR    BONE MARROW BIOPSY, BONE SPECIMEN, NEEDLE/TROCAR Left 08/07/2023    Procedure: Bone marrow biopsy, bone specimen, needle/trocar;  Surgeon: Teressa Rick PA-C;  Location: [facility] OR    BRONCHOSCOPY (RIGID OR FLEXIBLE), DIAGNOSTIC N/A 04/29/2022    Procedure: BRONCHOSCOPY, WITH BRONCHOALVEOLAR LAVAGE;  Surgeon: Murtaza Garcia MD;  Location: [facility]    BRONCHOSCOPY (RIGID OR FLEXIBLE), DIAGNOSTIC N/A 11/1/2024    Procedure: BRONCHOSCOPY, WITH BRONCHOALVEOLAR LAVAGE;  Surgeon: Murtaza Garcia MD;  Location: [facility]    IR CVC TUNNEL PLACEMENT > 5 YRS OF AGE  08/04/2021    IR CVC TUNNEL REMOVAL LEFT  09/02/2021    IR LIVER BIOPSY PERCUTANEOUS  02/21/2022    NEPHRECTOMY  2007    ORTHOPEDIC SURGERY      bilateral shoulder surgeries    PERCUTANEOUS BIOPSY LIVER N/A 02/21/2022    Procedure: NEEDLE BIOPSY, LIVER, PERCUTANEOUS;  Surgeon: Trace Castellanos MD;  Location: [facility] OR    PHACOEMULSIFICATION WITH STANDARD INTRAOCULAR LENS IMPLANT Left 1/15/2024    Procedure: LEFT EYE PHACOEMULSIFICATION, CATARACT, WITH STANDARD INTRAOCULAR LENS IMPLANT INSERTION;  Surgeon: Deshawn Menezes MD;  Location: [facility] OR    PHACOEMULSIFICATION WITH STANDARD INTRAOCULAR LENS IMPLANT Right 2/5/2024    Procedure: RIGHT EYE PHACOEMULSIFICATION, CATARACT, WITH STANDARD INTRAOCULAR LENS IMPLANT INSERTION;  Surgeon: Deshawn Menezes MD;  Location: [facility] OR        MEDICATIONS:  PTA Meds  Prior to Admission medications    Medication Sig Last Dose Taking? Auth Provider Long Term End Date   acyclovir (ZOVIRAX) 400 MG tablet Take 2 tablets (800 mg) by mouth every 12 hours   Chris Cote MD Yes    amLODIPine (NORVASC) 5 MG tablet Take 1 tablet (5  mg) by mouth daily   Keegan Chew MD Yes    Carboxymethylcell-Glycerin PF (REFRESH RELIEVA PF) 0.5-1 % SOLN Apply 1 drop to eye 4 times daily Preservative free. Either PF multidose bottle or PF vials   Aisha Juarez, ADILENE     dexAMETHasone alcohol-free (DECADRON) 0.1 MG/ML solution Swish and spit 10 mLs (1 mg) in mouth 2 times daily.   Chris Cote MD Yes    doxycycline hyclate (VIBRAMYCIN) 100 MG capsule Take 1 capsule (100 mg) by mouth 2 times daily.  Patient not taking: Reported on 10/29/2024   Chris Cote MD     escitalopram (LEXAPRO) 10 MG tablet Take 1 tablet (10 mg) by mouth daily   Akshat Bearden MD Yes    fluorometholone (FML LIQUIFILM) 0.1 % ophthalmic suspension Place 1 drop Into the left eye 3 times daily.   Aisha Juarez, OD No    levothyroxine (SYNTHROID/LEVOTHROID) 112 MCG tablet Take 1 tablet (112 mcg) by mouth daily   Natalia Gray MD Yes    LORazepam (ATIVAN) 1 MG tablet Take 1 mg by mouth every 6 hours as needed for anxiety. PRN   Reported, Patient No    metroNIDAZOLE (METROCREAM) 0.75 % external cream Apply topically 2 times daily Apply to the nose.   Zoe Wesley PA-C     nicotine polacrilex (NICORETTE) 4 MG gum Place 4 mg inside cheek daily.   Reported, Patient     Nutritional Supplements (ENSURE COMPLETE SHAKE) LIQD Take 11 mLs by mouth every morning   Reported, Patient     omeprazole (PRILOSEC) 40 MG DR capsule Take 1 capsule (40 mg) by mouth daily   Akshat Bearden MD     penicillin V (VEETID) 250 MG tablet Take 1 Tablet (250 mg) by mouth two times daily.   Chris Cote MD     predniSONE (DELTASONE) 10 MG tablet Take 1 tablet (10 mg) by mouth daily   Chris Cote MD No    predniSONE (DELTASONE) 5 MG tablet Take 1 tablet (5 mg) by mouth daily. Currently taking 15 mg daily ( on taper)   Chris Cote MD No    rosuvastatin (CRESTOR) 5 MG tablet Take 1 tablet (5 mg) by mouth daily.   Chris Cote MD Yes    sennosides (SENOKOT) 8.6 MG  tablet Take 1 tablet by mouth 3 times daily as needed for constipation   Layla Spencer PA-C     sodium chloride 0.9 % neb solution 3 mLs by Other route 2 times daily   Aisha Juarez OD     sulfamethoxazole-trimethoprim (BACTRIM DS) 800-160 MG tablet Take 4 tablets by mouth 2 times daily for 21 days.   Alba Markham MD  12/9/24   sulfamethoxazole-trimethoprim (BACTRIM DS) 800-160 MG tablet Take 3 tablets by mouth daily for 21 days.   Alba Markham MD  12/9/24   tacrolimus (PROTOPIC) 0.1 % external ointment Apply topically 2 times daily   Akshat Bearden MD        Current Meds  Current Facility-Administered Medications   Medication Dose Route Frequency Provider Last Rate Last Admin    acyclovir (ZOVIRAX) tablet 400 mg  400 mg Oral Q12H Skyler Baugh MD        [Held by provider] amLODIPine (NORVASC) tablet 5 mg  5 mg Oral At Bedtime Leeann Dudley PA-C   5 mg at 11/25/24 2231    atovaquone (MEPRON) suspension 750 mg  750 mg Oral BID Leeann Dudley PA-C   750 mg at 11/26/24 0835    carboxymethylcellulose PF (REFRESH PLUS) 0.5 % ophthalmic solution 1 drop  1 drop Ophthalmic 4x Daily Leeann Dudley PA-C   1 drop at 11/26/24 1256    enoxaparin ANTICOAGULANT (LOVENOX) injection 40 mg  40 mg Subcutaneous Q24H Leeann Dudley PA-C   40 mg at 11/25/24 2231    escitalopram (LEXAPRO) tablet 10 mg  10 mg Oral At Bedtime Leeann Dudley PA-C   10 mg at 11/25/24 2231    levothyroxine (SYNTHROID/LEVOTHROID) tablet 112 mcg  112 mcg Oral Daily Leeann Dudley PA-C   112 mcg at 11/26/24 0834    pantoprazole (PROTONIX) EC tablet 40 mg  40 mg Oral BID Kobi Hernadez MD   40 mg at 11/26/24 0834    penicillin V (VEETID) tablet 250 mg  250 mg Oral BID Leeann Dudley PA-C   250 mg at 11/26/24 0834    predniSONE (DELTASONE) tablet 15 mg  15 mg Oral Daily Leeann Dudley PA-C   15 mg at 11/26/24 0834    rosuvastatin (CRESTOR) tablet 5 mg  5 mg Oral At Bedtime Leeann Dudley PA-C   5  mg at 24 2231    sennosides (SENOKOT) tablet 1 tablet  1 tablet Oral At Bedtime Leeann Dudley PA-C   1 tablet at 24 2231    sodium chloride (PF) 0.9% PF flush 3 mL  3 mL Intracatheter Q8H Leeann Dudley PA-C   3 mL at 24 1256    tacrolimus (PROTOPIC) 0.1 % ointment   Topical BID Leeann Dudley PA-C         Infusion Meds  Current Facility-Administered Medications   Medication Dose Route Frequency Provider Last Rate Last Admin       ALLERGIES:    Allergies   Allergen Reactions    Sulfamethoxazole-Trimethoprim Rash       REVIEW OF SYSTEMS:  A comprehensive of systems was negative except as noted above.    SOCIAL HISTORY:   Social History     Socioeconomic History    Marital status:      Spouse name: Not on file    Number of children: Not on file    Years of education: Not on file    Highest education level: Not on file   Occupational History    Not on file   Tobacco Use    Smoking status: Former     Current packs/day: 0.00     Average packs/day: 2.0 packs/day for 30.0 years (60.0 ttl pk-yrs)     Types: Cigarettes     Start date: 1989     Quit date: 2019     Years since quittin.5     Passive exposure: Past    Smokeless tobacco: Never    Tobacco comments:     DAILY USE OF NICORETTE GUM   Vaping Use    Vaping status: Never Used   Substance and Sexual Activity    Alcohol use: Not Currently    Drug use: Never    Sexual activity: Not on file   Other Topics Concern    Parent/sibling w/ CABG, MI or angioplasty before 65F 55M? Not Asked   Social History Narrative    Not on file     Social Drivers of Health     Financial Resource Strain: Low Risk  (2023)    [facility]    Financial Resource Strain     Difficulty of Paying Living Expenses: 3     Difficulty of Paying Living Expenses: Not on file   Food Insecurity: No Food Insecurity (2024)    Received from [employer] [facility]    Food Insecurity     Do you worry your food will run out before you are able to buy more?: 1   Transportation Needs: No Transportation Needs (2024)    Received from [facility]    Transportation Needs     Does lack of transportation keep you from medical appointments?: 1     Does lack of transportation keep you from work, meetings or getting things that you need?: 1   Physical Activity: Not on file   Stress: Not on file   Social Connections: Socially Integrated (2024)    Received from [facility]    Social Connections     Do you often feel lonely or isolated from those around you?: 0   Interpersonal Safety: Low Risk  (2024)    Interpersonal Safety     Do you feel physically and emotionally safe where you currently live?: Yes     Within the past 12 months, have you been hit, slapped, kicked or otherwise physically hurt by someone?: No     Within the past 12 months, have you been humiliated or emotionally abused in other ways by your partner or ex-partner?: No   Housing Stability: Low Risk  (2024)    Received from [facility]    Housing Stability     What is your housing situation today?: 1     Reviewed with patient   Spouse and sister accompany Nik Nava in hospital room    FAMILY MEDICAL HISTORY:   Family History   Problem Relation Age of Onset    Lung Cancer Mother     Depression Mother     Polycythemia Father     Anesthesia Reaction Father         slow to awaken    Coronary Artery Disease Maternal Grandmother     Diabetes Maternal Grandmother     Hypertension Maternal Grandmother     Hypothyroidism Sister     Glaucoma No family hx of     Macular Degeneration No family hx of     Venous thrombosis No family hx of     Melanoma No family hx of     Skin Cancer No family hx of        PHYSICAL EXAM:   Temp  Av.2  F (36.2  C)  Min: 96.8  F (36  C)  Max: 97.7  F (36.5  C)  "     Pulse  Av.9  Min: 67  Max: 98 Resp  Av.7  Min: 16  Max: 20  SpO2  Av %  Min: 96 %  Max: 98 %       /73 (BP Location: Left arm)   Pulse 84   Temp 96.8  F (36  C) (Oral)   Resp 16   Ht 1.83 m (6' 0.05\")   Wt 110.2 kg (242 lb 14.4 oz)   SpO2 98%   BMI 32.90 kg/m     Date 24 07 - 24 0659   Shift 2528-0287 4881-5194 5431-1053 24 Hour Total   INTAKE   P.O. 480   480   Shift Total(mL/kg) 480(4.36)   480(4.36)   OUTPUT   Urine 200   200   Shift Total(mL/kg) 200(1.82)   200(1.82)   Weight (kg) 110.18 110.18 110.18 110.18      Admit Weight: 118.2 kg (260 lb 9.6 oz)     GENERAL APPEARANCE: sitting up in chair, no acute distress, awake, alert  Pulmonary: no increased work of breathing on room air  CV: regular rhythm per cardiac monitor   - 1+ pitting edema to mid shin b/l   - bilateral pulses symmetric  GI: soft, nontender  SKIN: warm, dry, no cyanosis  NEURO: face symmetric  LABS:   CMP  Recent Labs   Lab 24  1100 24  0330 24  0250 24  0018 24  2321 24  2238 24  2047 24  2041 24  1911 24  1744 24  1432 24  1451 24  1555   * 124*  --   --   --   --   --   --   --  125* 127* 131* 132*   POTASSIUM 5.3  5.3 5.0  --   --   --  5.0  --  4.9  --  5.9* 6.2* 5.5* 4.7   CHLORIDE 92* 92*  --   --   --   --   --   --   --  93* 91* 100 100   CO2 20* 20*  --   --   --   --   --   --   --  18* 20* 20* 19*   ANIONGAP 12 12  --   --   --   --   --   --   --  14 16* 11 13   * 112* 88 128*   < >  --    < >  --    < > 108* 130* 101* 169*   BUN 41.3* 38.6*  --   --   --   --   --   --   --  40.9* 37.0* 40.0* 38.8*   CR 2.08* 1.83*  --   --   --   --   --   --   --  1.83* 1.87* 1.82* 1.81*   GFRESTIMATED 34* 40*  --   --   --   --   --   --   --  40* 39* 40* 41*   DAMON 10.3 10.5*  --   --   --   --   --   --   --  10.9* 11.4* 10.0 9.9   MAG  --  2.2  --   --   --   --   --   --   --  2.2  --   --   --    PHOS  -- "  4.2  --   --   --   --   --   --   --   --   --   --   --    PROTTOTAL  --   --   --   --   --   --   --   --   --  7.3 7.6 6.9 6.7   ALBUMIN  --   --   --   --   --   --   --   --   --  4.4 4.6 4.1 3.9   BILITOTAL  --   --   --   --   --   --   --   --   --  0.2 0.2 0.2 0.2   ALKPHOS  --   --   --   --   --   --   --   --   --  102 106 114 107   AST  --   --   --   --   --   --   --   --   --  40 42 43 41   ALT  --   --   --   --   --   --   --   --   --  40 41 39 37    < > = values in this interval not displayed.     CBC  Recent Labs   Lab 11/26/24  0330 11/25/24  1744 11/25/24  1432 11/22/24  1451   HGB 12.1* 12.3* 12.6* 11.6*   WBC 7.0 7.1 9.0 11.4*   RBC 3.74* 3.74* 3.85* 3.53*   HCT 35.0* 35.5* 36.3* 32.5*   MCV 94 95 94 92   MCH 32.4 32.9 32.7 32.9   MCHC 34.6 34.6 34.7 35.7   RDW 15.9* 16.2* 16.4* 16.6*    230 224 231     INRNo lab results found in last 7 days.  ABGNo lab results found in last 7 days.   URINE STUDIES  Recent Labs   Lab Test 11/26/24  1157 10/25/24  1402 07/09/24  1250 02/20/24  1507   COLOR Yellow Yellow Dark Yellow* Yellow   APPEARANCE Slightly Cloudy* Clear Clear Clear   URINEGLC Negative Negative Negative Negative   URINEBILI Negative Negative Negative Negative   URINEKETONE Negative Negative Trace* Negative   SG 1.021 1.024 1.031 1.014   UBLD Negative Negative Negative Negative   URINEPH 6.0 6.0 6.0 6.5   PROTEIN 30* 20* 70* Negative   NITRITE Negative Negative Negative Negative   LEUKEST Negative Negative Negative Negative   RBCU 1 1 1 <1   WBCU 0 <1 1 <1     No lab results found.  PTH  Recent Labs   Lab Test 10/25/24  1303 12/21/23  1136 11/27/23  0753 10/18/22  1412   PTHI 36 58 54 60     IRON STUDIES  Recent Labs   Lab Test 04/02/24  1345 11/14/23  1234 10/10/23  1143 08/15/23  1316 06/13/23  1424 03/28/23  1548 11/08/22  1446 10/18/22  1412 08/18/22  1030 07/08/21  1055   IRON 128 149 48* 38* 66 28* 114  --   --   --      --  283 282 283 279 351  --   --   --     ROXANA 44  --  17 13* 23 10* 32  --   --   --    CLARE 1,597* 2,686* 1,997* 1,610* 1,474* 1,381* 2,813* 2,348* 1,023* 762*       IMAGING:  All imaging studies reviewed by me.     Tom Silver

## 2024-11-26 NOTE — PROGRESS NOTES
Admission          11/25/2024  5:22 PM  -----------------------------------------------------------  Reason for admission: hyperkalemia   Primary team notified of pt arrival.  Admitted from: ED  Via: stretcher  Accompanied by: spouse  Belongings: Placed in closet; valuables sent home with family  Admission Profile: complete  Teaching: orientation to unit and call light- call light within reach, use of console, meal times, when to call for the RN, and enforced importance of safety   Access: right port  Telemetry:Placed on pt  Ht./Wt.: complete  Code Status verified on armband: yes  2 RN Skin Assessment Completed By: Heidi KING RN and Amrit BARNES RN. scattered bruises on bilateral arms. Redness noted on coccyx and bilateral groin area. Scabs between the third and fourth toes on left foot.   Med Rec completed: yes  Suction/Ambu-bag/Flowmeter-at bedside: yes  Is patient having diarrhea upon admission- if YES fill out testing algorithm : no          Pt status:    Temp:  [96.9  F (36.1  C)-97.7  F (36.5  C)] 96.9  F (36.1  C)  Pulse:  [67-98] 67  Resp:  [20] 20  BP: (129-139)/(72-91) 136/72  SpO2:  [97 %-98 %] 98 %

## 2024-11-26 NOTE — CONSULTS
Nik Nava is a 64 year old with PMHx of ALL s/p VERÓNICA, complicated by cGVHD involving the eyes, oral mucosa and skin.      He has solitary kidney. Recently has been on Prednisone 15mg due to inability to taper steroids.      Had also liver cGVHD 2022.      On oral dexamethasone rinses (takes 1-2 times a day)   and tacro oral ointment (Takes 1-2 times weekly)     Has scleral lenses.      Currently G1 GVHD     Previously treated with Tacro/Siro and Belumosudil.      Had recently been started on Bactrim due to PJP pneumonia, however had worsening MADI on Bactrim.     Physical Exam:   On exam, he has a mild faint macular rash on his back. Has some mucosal erosions on exam and patchy erythema in the mouth, with no active ulcers.      Recommendations:      Recommend 15mg Prednisone daily.   Check Cystatin C to see if actual Madi vs decreased secretion of creatinine due to bactrim.   ID consult for other treatment for PJP.   Eventual plan to titrate down on pred and switch to hydrocortisone for AI.   Discussed other GVHD therapies such as Axatilimab if has moderate GVHD.   Please reach out to us prior to discharge so that we can schedule his follow up appointment.     Alec Dutta MD  Division of Hematology, Oncology and Transplantation.

## 2024-11-26 NOTE — PROGRESS NOTES
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     Interval changes:  - NEGRA still worsening today, nephrology consulted   - K still slightly elevated 5.3, PRN lokelma and BID BMP for now  - Transplant ID consulted, pending recs re PJP pneumonia mgmt    #Acute on chronic kidney disease with Hyperkalemia 2/2 bactrim use  #Hyponatremia  Nik was diagnosed with PJP pneumonia after BAL on 11/1/2024. He was started on Bactrim 11/18 with plan for 21 day course. He had labs 11/22 which revealed elevated Cr from baseline (1.82), hyponatremia (131) and mild hyperkalemia (5.5). He had repeat outpatient labs today with results trending up with Cr 1.87 and potassium level 6.2 with trending down sodium level at 127. and therefore was sent to ED. No EKG changes. Patient reports general malaise/feeling unwell with worsening leg movements/jittery and anorexia. He had vomiting x 2 today. In ED, he received calcium gluconate 1 g IV, Lasix 20 mg IV, regular insulin 10 units IV, Dextrose 25 g, and Lokelma 10g with improvement in potassium to 5.9.   - BID BMP  - cont PRN lokelma for mild hyperK  - nephrology consult recs pending    #PJP Pneumonia  Patient reports intermittent fever, chills and dypsnea on exertion [id number]. Peak temp at home 101.8F. Evaluated outpatient and started 7 day course of doxycycline. Extensive work up revealed PJP positive BAL 1/1/2024. Started on Bactrim 11/18 with plan for 21 day course. Most recent CT Chest with contrast on 11/21/2024 with scattered groundglass opacities within the bilateral lung  fields suggestive of infection. Similar to CT 10/21/2024 but now slightly increased in the right lower lobe apex. Cased discussed with ID by ED provider and antibiotic changed to atovaquone.   -Stopped PTA bactrim  -Cont atovaquone 750 mg twice daily   -ID consulted, appreciate recs    #Acute lymphoblastic leukemia s/p VERÓNICA allo  #Chronic GVHD  Reviewed recent Oncology note from 11/22/2024. 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 History of oral GVHD but patient states dose not currently need his s/s dexamethasone  -Cont Refresh eye drops. Not currently using fluorometholone eye drops  -Cont prednisone 15 mg daily   -BMT following  -Cont penicillin V and acyclovir for prophylaxis     #Rhinovirus infection  Reports recent congestion and cough. Tested positive to rhinovirus 11/18 and 11/21 on viral panel  -Supportive care    #Anemia of chronic disease  Stable  -Monitor    #History of Grave's disease  -Cont synthroid  -Scheduled for virtual Endo appointment today.     #Hyperlipidemia  -Cont statin      Diet:  Regular   DVT Prophylaxis: Enoxaparin (Lovenox) SQ  Ga Catheter: Not present  Lines: PRESENT             Cardiac Monitoring: None  Code Status:  Full Code. Discussed with patient. Wife present.           Diet: Combination Diet Regular Diet Adult    DVT Prophylaxis: Enoxaparin (Lovenox) SQ  Ga Catheter: Not present  Lines: PRESENT             Cardiac Monitoring: None  Code Status: Full Code      Clinically Significant Risk Factors Present on Admission        # Hyperkalemia: Highest K = 6.2 mmol/L in last 2 days, will monitor as appropriate  # Hyponatremia: Lowest Na = 124 mmol/L in last 2 days, will monitor as appropriate  # Hypochloremia: Lowest Cl = 91 mmol/L in last 2 days, will monitor as appropriate   # Hypercalcemia: Highest Ca = 11.4 mg/dL in last 2 days, will monitor as appropriate        # Hypertension: Noted on  "problem list           # Obesity: Estimated body mass index is 32.9 kg/m  as calculated from the following:    Height as of this encounter: 1.83 m (6' 0.05\").    Weight as of this encounter: 110.2 kg (242 lb 14.4 oz).              Social Drivers of Health    Tobacco Use: Medium Risk (11/21/2024)    Patient History     Smoking Tobacco Use: Former     Smokeless Tobacco Use: Never     Passive Exposure: Past          Disposition Plan     Medically Ready for Discharge: Anticipated in 2-4 Days             Jayro Koehler MD  Hospitalist Service, GOLD TEAM 8  [facility]  Securely message with [employer] (more info)  Text page via [facility] Paging/Directory   See signed in provider for up to date coverage information  ______________________________________________________________________    Interval History   NAEO.     Physical Exam   Vital Signs: Temp: 97.6  F (36.4  C) Temp src: Oral BP: 104/73 Pulse: 90   Resp: 18 SpO2: 96 % O2 Device: None (Room air)    Weight: 242 lbs 14.4 oz    Gen: no acute distress, alert  CV: RRR, no murmurs  Pulm: no increased WOB  Abd: soft, nl BS  Extremities: 1+ LE edema    Medical Decision Making       50 MINUTES SPENT BY ME on the date of service doing chart review, history, exam, documentation & further activities per the note.      Data     I have personally reviewed the following data over the past 24 hrs:    7.0  \   12.1 (L)   / 219     124 (L) 92 (L) 41.3 (H) /  106 (H)   5.3; 5.3 20 (L) 2.08 (H) \     ALT: 40 AST: 40 AP: 102 TBILI: 0.2   ALB: 4.4 TOT PROTEIN: 7.3 LIPASE: N/A     TSH: 2.40 T4: N/A A1C: N/A       "

## 2024-11-26 NOTE — PROGRESS NOTES
Nik Nava is a 64 year old with PMHx of ALL s/p VERÓNICA, complicated by cGVHD involving the eyes, oral mucosa and skin.     He has solitary kidney. Recently has been on Prednisone 15mg due to inability to taper steroids.     Had also liver cGVHD 2022.     On oral dexamethasone rinses (takes 1-2 times a day)   and tacro oral ointment (Takes 1-2 times weekly)    Has scleral lenses.     Currently G1 GVHD    Previously treated with Tacro/Siro and Belumosudil.     Had recently been started on Bactrim due to PJP pneumonia, however had worsening MADI on Bactrim.     Recommendations:     Recommend 15mg Prednisone daily.   Check Cystatin C to see if actual Madi vs decreased secretion of creatinine due to bactrim.   ID consult for other treatment for PJP.   Eventual plan to titrate down on pred and switch to hydrocortisone for AI.   Discussed other GVHD therapies such as Axatilimab if has moderate GVHD.   Please reach out to us prior to discharge so that we can schedule his follow up appointment.    Alec Dutta MD  Division of Hematology, Oncology and Transplantation.

## 2024-11-26 NOTE — PROVIDER NOTIFICATION
"Provider (Marian Reid) paged via [employer]:    \"Hi, FYI: pts Na-124, K-5.0 and creat-1.83\"  "

## 2024-11-26 NOTE — PLAN OF CARE
"/76 (BP Location: Left arm)   Pulse 87   Temp 96.8  F (36  C) (Oral)   Resp 18   Ht 1.83 m (6' 0.05\")   Wt 118.2 kg (260 lb 9.6 oz)   SpO2 96%   BMI 35.30 kg/m       Neuro: A&Ox4. Forgetful at times.  Cardiac: afebrile. On tele, shows SR with HR-70's-80's. OVSS.   Respiratory: Sating at 96% on RA. Denies SOB, chest pain, lightheadness and dizziness.   GI/: denies n/v//d. Adequate urine output. BM X 0 on this shift.   Diet/appetite: Tolerating regular diet.   Activity:  SBA, bed alarm on.   Pain: At acceptable level on current regimen. Denies pain.   Skin: No new deficits noted.  LDA's: right port, SL.   PRN'S: pt was up most of the night, prn ativan given x1 for sleep promotion per pt requested.     Plan: Still need stool sample for c.diff rule out. BG rechecked after dextrose 10%  bolus was 88, provider aware. AM labs results- Na-124, K-5.0 and creatinine 1.83, provider notified, urine osmolality and sodium random ordered and obtained, result pending. Continue with POC. Notify primary team with changes.   Problem: Adult Inpatient Plan of Care  Goal: Plan of Care Review  Description: The Plan of Care Review/Shift note should be completed every shift.  The Outcome Evaluation is a brief statement about your assessment that the patient is improving, declining, or no change.  This information will be displayed automatically on your shift  note.  Outcome: Progressing  Flowsheets (Taken 11/26/2024 0053)  Plan of Care Reviewed With:   patient   spouse  Overall Patient Progress: improving   Goal Outcome Evaluation:      Plan of Care Reviewed With: patient, spouse    Overall Patient Progress: improvingOverall Patient Progress: improving           "

## 2024-11-26 NOTE — CONSULTS
Transplant Infectious Diseases Inpatient Consultation      Nik Nava MRN# [id number]   YOB: 1960 Age: 64 year old   Date of Admission and time: 11/25/2024  5:22 PM     Reason for consult: Hyperkalemia from TMP/SMX use in setting of PJP        Recommendations:   Continue atovaquone 750 mg BID for 14 days (Dates: 11/26-12/10)  Once he has finished his atovaquone, transition to TMP//800 (DS) three times weekly for ongoing PPx  Appreciate nephrology and medicine's assistance with managing hyperkalemia and elevated creatinine   Appreciate BMT's assistance with tapering steroids as able  Continue acyclovir for recurrent oral herpes prevention with dose reduced to 400 mg BID, given NEGRA.    Continue penicillin PPx     Transplant infectious diseases will continue to follow with you.     Attestation:  Total duration of visit including chart review, reviewing labs and imaging, interviewing and examining the patient, documentation, and sending communication to the primary treating team, all at the same day of this encounter, is: 68 minutes.       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 He is is currently on 15 mg/day prednisone and dex swish and swallow. He was recently seen by me in ID clinic for unexplained fevers and pulmonary GGOs. Ultimately found to have positive PJP PCR on BAL from 11/1/2024. Started on 15 mg/kg/day of TMP/SMX on 11/18/24. Now admitted for TMP/SMX-induced hyperkalemia, for which we are being consulted.         Active Problems and Infectious Diseases Issues:   #PJP on BAL from 11/1/2024 in setting of fevers and pulmonary GGOs  #Hyperkalemia and elevated creatinine after starting TMP/SMX   Recently experienced  unexplained fevers from 10/24-11/5 with CT chest (10/21) with peribronchovascular GGOs. BAL from 11/1 positive for PJP. Started on TMP/SMX at 15 mg/kg/day on 11/18/24. Developed nausea, vomiting, and hyperkalemia on 11/25/24. Transitioned to atovaquone on 11/25. Will plan for 21 day course of treatment (Dates: 11/18-12/10). He can transition to TMP/SMX prophylaxis indefinitely thereafter.     #History of Possible Rash with TMP/SMX  Previously experienced a maculopapular rash around the time that he started TMP/SMX. However, did not develop a rash with rechallenge on high-dose TMP/SMX, so have removed this from his allergy list.     #Rhinovirus  Developed this after visiting his grandson. Tested positive on 11/18/24. Symptoms minimal at this point. Not treatment needed.     Transplant Checklist  - QTc interval: 444 (11/25/24)  - Pneumocystis prophylaxis: Previously on pentamidine, but stopped in 6/2023. PJP PCR from Bal positive on 11/1/2024. Started on TMP/SMX at 15 mg/kg/day on 11/18/24. Transitioned to atovaquone on 11/25/24 due to hyperkalemia.   - Bacterial prophylaxis: Penicillin  - Fungal prophylaxis: None  - Serostatus & viral prophylaxis: CMV D+/R+, HSV ?, EBV + Acyclovir. Developed recurrent oral HSV after stopping acyclovir.   - Immunization status:  Up-to-date on seasonal COVID-19 and influenza, Shingrix, RSV (8/2024), PCV-20 (5/2024), HBV, and Tetanus (5/2024).   - Gamma globulin status:   Recent Labs   Lab Test 10/25/24  1303   *     - Antibiotic allergies: None. Previously listed as TMP/SMX with a rash, but I have discontinued this.         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 Bone biopsy from 3/31/23 without culture growth. No cellular material present. Treated with 8 weeks IV CTX (end 5/22/2023). Re=admitted 8/2023 with increasing back pain and progression of  discitis/osteomyelitis. Treated with vancomycin and ertapenem for 8 weeks. Transitioned to PO clindamycin-->doxycycline and Augmentin for an additional 4 weeks. Repeat spinal imaging on 11/29 with significant improvement.    COVID-19 x2: Diagnosed last in 1/2023. Treated with Paxlovid  CMV Viremia: Diagnosed in 4/2022. Treated with Valcyte 900 mg BID.   Oral Herpes : Initialy diagnosed in 12/2023. Treated with Valtrex with subsequent recurrence. Has been on acyclvoir PPx since 1/20/2024 without recurrence.   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 He is is currently on 15 mg/day prednisone and dex swish and swallow. He was recently seen by me in ID clinic for unexplained fevers and pulmonary GGOs. Ultimately found to have positive PJP PCR on BAL from 11/1/2024. Started on 15 mg/kg/day of TMP/SMX on 11/18/24. Now admitted for TMP/SMX-induced hyperkalemia, for which we are being consulted.     Was doing well until 11/24 when he developed nausea and leg twitching. Vomited x2 on 11/25. Had labs drawn and found to have Cr=1.87 and K=6.2. Called to come to the ED, which he did on 11/25. Treated with calcium gluconate, Laxis, insulin, dextrose, and Lokelma with improvement in K. TMP/SMX discontinued and he was transitioned to atovaquone per my recommendations.     Today he is feeling well. No acute complaints. Denies fevers/chills. Has no cough.               Review of Systems:      As mentioned in the HPI otherwise negative by reviewing constitutional symptoms, central and peripheral neurological systems, respiratory system, cardiac system, GI system,  system, musculoskeletal, skin, allergy, and lymphatics.                  Past Medical History:     Past Medical History:   Diagnosis Date    ALL (acute lymphocytic leukemia)  (H)     CKD (chronic kidney disease)     GVHD (graft versus host disease) (H)     H/O peripheral stem cell transplant (H)     Hyperthyroidism 1996    Infection due to 2019 novel coronavirus 01/16/2023    Influenza A 11/2022    Postablative hypothyroidism 1997    Pulmonary embolism (H)     Renal cell carcinoma (H) 2007    right kidney    Sleep apnea             Past Surgical History:     Past Surgical History:   Procedure Laterality Date    APPENDECTOMY      BACK SURGERY  2017    BONE MARROW BIOPSY, BONE SPECIMEN, NEEDLE/TROCAR Left 09/02/2021    Procedure: BIOPSY, BONE MARROW;  Surgeon: Jailyn yN APRN CNP;  Location: [facility] OR    BONE MARROW BIOPSY, BONE SPECIMEN, NEEDLE/TROCAR Left 11/15/2021    Procedure: BIOPSY, BONE MARROW;  Surgeon: Socrates De La Torre;  Location: [facility] OR    BONE MARROW BIOPSY, BONE SPECIMEN, NEEDLE/TROCAR Left 02/07/2022    Procedure: BIOPSY, BONE MARROW;  Surgeon: Renetta Wiggins PA-C;  Location: [facility] OR    BONE MARROW BIOPSY, BONE SPECIMEN, NEEDLE/TROCAR Left 08/18/2022    Procedure: BIOPSY, BONE MARROW;  Surgeon: Lorrie Yap PA-C;  Location: [facility] OR    BONE MARROW BIOPSY, BONE SPECIMEN, NEEDLE/TROCAR Left 08/07/2023    Procedure: Bone marrow biopsy, bone specimen, needle/trocar;  Surgeon: Teressa Rick PA-C;  Location: [facility] OR    BRONCHOSCOPY (RIGID OR FLEXIBLE), DIAGNOSTIC N/A 04/29/2022    Procedure: BRONCHOSCOPY, WITH BRONCHOALVEOLAR LAVAGE;  Surgeon: Murtaza Garcia MD;  [facility]    BRONCHOSCOPY (RIGID OR FLEXIBLE), DIAGNOSTIC N/A 11/1/2024    Procedure: BRONCHOSCOPY, WITH BRONCHOALVEOLAR LAVAGE;  Surgeon: Murtaza Garcia MD;  Location: [facility]    IR CVC TUNNEL PLACEMENT > 5 YRS OF AGE  08/04/2021    IR CVC TUNNEL REMOVAL LEFT  09/02/2021    IR LIVER BIOPSY PERCUTANEOUS  02/21/2022    NEPHRECTOMY  2007    ORTHOPEDIC SURGERY      bilateral shoulder surgeries    PERCUTANEOUS BIOPSY LIVER N/A 02/21/2022    Procedure: NEEDLE BIOPSY, LIVER, PERCUTANEOUS;   Surgeon: Trace Castellanos MD;  Location: [facility] OR    PHACOEMULSIFICATION WITH STANDARD INTRAOCULAR LENS IMPLANT Left 1/15/2024    Procedure: LEFT EYE PHACOEMULSIFICATION, CATARACT, WITH STANDARD INTRAOCULAR LENS IMPLANT INSERTION;  Surgeon: Deshawn Menezes MD;  Location: [facility] OR    PHACOEMULSIFICATION WITH STANDARD INTRAOCULAR LENS IMPLANT Right 2024    Procedure: RIGHT EYE PHACOEMULSIFICATION, CATARACT, WITH STANDARD INTRAOCULAR LENS IMPLANT INSERTION;  Surgeon: Deshawn Menezes MD;  Location: [facility] OR            Social History:     Social History     Tobacco Use    Smoking status: Former     Current packs/day: 0.00     Average packs/day: 2.0 packs/day for 30.0 years (60.0 ttl pk-yrs)     Types: Cigarettes     Start date: 1989     Quit date: 2019     Years since quittin.5     Passive exposure: Past    Smokeless tobacco: Never    Tobacco comments:     DAILY USE OF NICORETTE GUM   Substance Use Topics    Alcohol use: Not Currently            Family History:   I have reviewed this patient's family history  Family History   Problem Relation Age of Onset    Lung Cancer Mother     Depression Mother     Polycythemia Father     Anesthesia Reaction Father         slow to awaken    Coronary Artery Disease Maternal Grandmother     Diabetes Maternal Grandmother     Hypertension Maternal Grandmother     Hypothyroidism Sister     Glaucoma No family hx of     Macular Degeneration No family hx of     Venous thrombosis No family hx of     Melanoma No family hx of     Skin Cancer No family hx of             Immunizations:     Immunization History   Administered Date(s) Administered    COVID-19 12+ (MODERNA) 2024    COVID-19 12+ (Pfizer) 2023    COVID-19 Bivalent 12+ (Pfizer) 2022    COVID-19 MONOVALENT 12+ (Pfizer) 2021, 2021    DTAP,IPV,HIB,HEPB (VAXELIS) 2024    DTaP/HepB/IPV 2023    HIB (PRP-T) 2023    Influenza Vaccine 18-64 (Flublok) 10/12/2021     Influenza Vaccine 65+ (FLUAD) 09/08/2023    Pneumo Conj 13-V (2010&after) 11/14/2023    Pneumococcal 20 valent Conjugate (Prevnar 20) 05/09/2024    TDAP (Adacel,Boostrix) 10/27/2016    Zoster recombinant adjuvanted (SHINGRIX) 11/14/2023, 01/09/2024            Allergies:     Allergies   Allergen Reactions    Sulfamethoxazole-Trimethoprim Rash             Medications:   Medications that Require Transfusion:   Current Facility-Administered Medications   Medication Dose Route Frequency Provider Last Rate Last Admin     Scheduled Medications:   Current Facility-Administered Medications   Medication Dose Route Frequency Provider Last Rate Last Admin    acyclovir (ZOVIRAX) tablet 400 mg  400 mg Oral Q12H Skyler Baugh MD        [Held by provider] amLODIPine (NORVASC) tablet 5 mg  5 mg Oral At Bedtime Leeann Dudley PA-C   5 mg at 11/25/24 2231    atovaquone (MEPRON) suspension 750 mg  750 mg Oral BID Leeann Dudley PA-C   750 mg at 11/26/24 0835    carboxymethylcellulose PF (REFRESH PLUS) 0.5 % ophthalmic solution 1 drop  1 drop Ophthalmic 4x Daily Leeann Dudley PA-C   1 drop at 11/26/24 1256    enoxaparin ANTICOAGULANT (LOVENOX) injection 40 mg  40 mg Subcutaneous Q24H Leeann Dudley PA-C   40 mg at 11/25/24 2231    escitalopram (LEXAPRO) tablet 10 mg  10 mg Oral At Bedtime Leeann Dudley PA-C   10 mg at 11/25/24 2231    levothyroxine (SYNTHROID/LEVOTHROID) tablet 112 mcg  112 mcg Oral Daily Leeann Dudley PA-C   112 mcg at 11/26/24 0834    pantoprazole (PROTONIX) EC tablet 40 mg  40 mg Oral BID Kobi Hernadez MD   40 mg at 11/26/24 0834    penicillin V (VEETID) tablet 250 mg  250 mg Oral BID Leeann Dudley PA-C   250 mg at 11/26/24 0834    predniSONE (DELTASONE) tablet 15 mg  15 mg Oral Daily Leeann Dudley PA-C   15 mg at 11/26/24 0834    rosuvastatin (CRESTOR) tablet 5 mg  5 mg Oral At Bedtime Leeann Dudley PA-C   5 mg at 11/25/24 2231    sennosides (SENOKOT) tablet 1 tablet  1 tablet  "Oral At Bedtime Leeann Dudley PA-C   1 tablet at 11/25/24 2231    sodium chloride (PF) 0.9% PF flush 3 mL  3 mL Intracatheter Q8H Leeann Dudley PA-C   3 mL at 11/26/24 1256    tacrolimus (PROTOPIC) 0.1 % ointment   Topical BID Leeann Dudley PA-C              Physical Exam     Physical Exam     Vital signs:  /73 (BP Location: Left arm)   Pulse 84   Temp 96.8  F (36  C) (Oral)   Resp 16   Ht 1.83 m (6' 0.05\")   Wt 110.2 kg (242 lb 14.4 oz)   SpO2 98%   BMI 32.90 kg/m      GENERAL: alert and no distress  NECK: no adenopathy, no asymmetry, masses, or scars  RESP: lungs clear to auscultation - no rales, rhonchi or wheezes  CV: regular rate and rhythm, normal S1 S2, no S3 or S4, no murmur, click or rub, no peripheral edema  ABDOMEN: soft, nontender, no hepatosplenomegaly, no masses and bowel sounds normal  MS: no gross musculoskeletal defects noted, no edema        Data     Data   Laboratory data and imaging listed below was reviewed prior to this encounter.     Microbiology:    Culture   Date Value Ref Range Status   11/01/2024 No growth after 24 days  Preliminary   11/01/2024 No growth after 25 days  Preliminary   11/01/2024 2+ Normal tono  Final   11/01/2024 No growth after 17 days  Preliminary   11/01/2024 No growth after 25 days  Preliminary   11/01/2024 1+ Normal tono  Final   10/15/2024 No Growth  Final   10/15/2024 No Growth  Final   04/29/2022 No Growth  Final   04/29/2022 No Growth  Final   04/29/2022 1+ Normal tono  Final   09/12/2021 No Growth  Final   08/17/2021   Final    No Vancomycin Resistant Enterococcus species isolated     GS Culture   Date Value Ref Range Status   11/01/2024 See corresponding culture for results  Final   11/01/2024 See corresponding culture for results  Final   , CD4:   Recent Labs   Lab Test 10/18/24  1231 05/09/24  1404   CD3T 62 69   ACD3 1,673 2,077   CD8 43* 50*   ACD8 1,156 1,507*   CD4 18* 20*   ACD4 496 590   CDTHTS 0.43* 0.39*    and HIV RNA: No lab " "results found., Hepatitis B Testing:   Recent Labs   Lab Test 02/21/22  1214 07/08/21  1055   HBCAB Nonreactive Nonreactive   HEPBANG Nonreactive Nonreactive   , Hepatitis C Testing:   Hepatitis C Antibody   Date Value Ref Range Status   02/21/2022 Nonreactive Nonreactive Final   07/08/2021 Nonreactive NR^Nonreactive Final     Comment:     Assay performance characteristics have not been established for newborns,   infants, and children     , PJP PCR:   Recent Labs   Lab Test 11/01/24  1017   PJRDFA Detected*   , TB Quant: No lab results found.    Invalid input(s): \"[id number]\", \"[id number]\", Respiratory Viral Panel:   Recent Labs   Lab Test 11/21/24  1555 11/18/24  1532 10/25/24  1341 10/15/24  1202 12/21/23  1136 05/18/22  1228   ADENOV Not Detected Not Detected Not Detected Not Detected Not Detected Not Detected   CORONA Not Detected Not Detected Not Detected Not Detected Not Detected Not Detected   HMPV Not Detected Not Detected Not Detected Not Detected Not Detected Not Detected   RHINEV Detected* Detected* Not Detected Not Detected Detected* Not Detected   IFLUA Not Detected Not Detected Not Detected Not Detected Not Detected Not Detected   FLUAH1 Not Detected Not Detected Not Detected Not Detected Not Detected Not Detected   [id number] Not Detected Not Detected Not Detected Not Detected Not Detected Not Detected   FLUAH3 Not Detected Not Detected Not Detected Not Detected Not Detected Not Detected   IFLUB Not Detected Not Detected Not Detected Not Detected Not Detected Not Detected   PIV1 Not Detected Not Detected Not Detected Not Detected Not Detected Not Detected   PIV2 Not Detected Not Detected Not Detected Not Detected Not Detected Not Detected   PIV3 Not Detected Not Detected Not Detected Not Detected Not Detected Not Detected   RSVA Not Detected Not Detected Not Detected Not Detected Not Detected Not Detected   RSVB Not Detected Not Detected Not Detected Not Detected Not Detected Not Detected   CHLPNE Not " Detected Not Detected Not Detected Not Detected Not Detected Not Detected   MYCPNE Not Detected Not Detected Not Detected Not Detected Not Detected Not Detected   , and C Diff: No lab results found., Immune Globulin Studies:   Recent Labs   Lab Test 10/25/24  1303 10/18/24  1042 10/15/24  1202 12/27/22  1136 08/18/22  1030 04/12/22  0951 11/15/21  0930   * 574* 587*   < > 652   < > 894   IGM  --   --   --   --  38  --  88   IGA  --   --   --   --  77*  --  231    < > = values in this interval not displayed.   , HSV 1/2 IgG:   Recent Labs   Lab Test 07/08/21  1055   H1IGG >8.0*   H2IGG <0.2    and VZV IgG: No lab results found., Quantitative CMV PCR:   Recent Labs   Lab Test 10/25/24  1446 03/13/24  1117 02/27/24  1323 03/01/23  1114 12/13/22  1402 11/22/22  0817 11/08/22  1446 10/18/22  1412 10/11/22  1519 09/20/22  1509 09/06/22  1309 08/18/22  1126 07/13/22  1107 06/21/22  1555 06/07/22  0953 05/26/22  1210 05/18/22  1116 05/10/22  1040 05/04/22  0942 04/29/22  1129 04/28/22  1408 04/20/22  0833 03/31/22  1550 03/16/22  1019 03/09/22  1137 03/09/22  1137 03/01/22  0746 02/24/22  1714 02/21/22  1214 02/16/22  0953 02/07/22  0945 01/04/22  0953 12/07/21  1117 11/30/21  1023 10/26/21  0955 10/19/21  0926 10/14/21  0823 10/08/21  1203 09/14/21  1030 09/09/21  1011 09/07/21  1530 09/04/21  0924 09/02/21  0734 08/30/21  0841 08/26/21  1119 08/18/21  0358 08/11/21  0405 08/04/21  0351   CMVQNT Not Detected Not Detected Not Detected Not Detected Not Detected Not Detected <137* Not Detected Not Detected Not Detected Not Detected Not Detected Not Detected Not Detected Not Detected Not Detected Not Detected Not Detected Not Detected Not Detected Not Detected <137*  --  <137*  --  <137* Not Detected Not Detected Not Detected Not Detected Not Detected Not Detected Not Detected Not Detected Not Detected Not Detected Not Detected Not Detected Not Detected Not Detected Not Detected Not Detected Not Detected Not Detected  Not Detected Not Detected Not Detected Not Detected   CMVLOG  --   --   --   --   --   --  <2.1  --   --   --   --   --   --   --   --   --   --   --   --   --   --  <2.1   < > <2.1   < > <2.1  --   --   --   --   --   --   --   --   --   --   --   --   --   --   --   --   --   --   --   --   --   --     < > = values in this interval not displayed.    and CMV IgG:   Recent Labs   Lab Test 07/08/21  1055   CMVIGG >8.0*   , and Quantitative EBV PCR:   Recent Labs   Lab Test 03/13/24  1117 02/27/24  1323 03/28/23  1548 03/01/23  1114 11/08/22  1446 10/11/22  1519 08/18/22  1030 07/13/22  1107 06/07/22  0953 05/10/22  1040 05/04/22  0942 04/28/22  1408 04/22/22  0845 04/20/22  0833 02/21/22  1214 09/14/21  1030   EBRES  --   --   --   --  Not Detected Not Detected  --  Not Detected Not Detected Not Detected Not Detected <500*  --   --   --  <500*   EBLOG 2.7 3.1 2.9 3.0  --   --  3.0  --   --   --   --  <2.7 4.8 4.7   < > <2.7    < > = values in this interval not displayed.    and EBV EA IgG: No lab results found.                    Alba Markham MD  [facility]  Contact information available via [facility] Paging/Directory     11/26/2024

## 2024-11-26 NOTE — PLAN OF CARE
Highlights/changes today:   Urine sample ordered and collected, sent to lab.   Waiting for stool sample, no BM during shift. Pt states he can not pass BM anywhere else but at his home, refusing to have BM while admitted.   One time dose of Lokelma ordered and given x 1 during shift.     Neuro: A&Ox4. Calm, cooperative, and pleasant.   Cardiac: SR, HR 80's. Afebrile and VSS.   Respiratory: Sating in the high 90's on RA.  GI/: Up to bathroom with adequate urine output and no BM during shift, pt refusal.   Diet/appetite: Tolerating regular diet, eating well.  Activity: STBA, up to bathroom and sitting on edge of bed during shift.   Pain: Denies.   Skin: No new deficits noted.  LDA's: SUMEET Buenrostro.     Plan: Possible discharge tomorrow per team. Continue with POC. Notify primary team with changes.

## 2024-11-26 NOTE — H&P
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     #Acute on chronic kidney disease with Hyperkalemia 2/2 bactrim use  #Hyponatermia  Nik was diagnosed with PJP pneumonia after BAL on 11/1/2024. He was started on Bactrim 11/18 with plan for 21 day course. He had labs 11/22 which revealed elevated Cr from baseline (1.82), hyponatremia (131) and mild hyperkalemia (5.5). He had repeat outpatient labs today with results trending up with Cr 1.87 and potassium level 6.2 with trending down sodium level at 127. and therefore was sent to ED. No EKG changes. Patient reports general malaise/feeling unwell with worsening leg movements/jittery and anorexia. He had vomiting x 2 today. In ED, he received calcium gluconate 1 g IV, Lasix 20 mg IV, regular insulin 10 units IV, Dextrose 25 g, and Lokelma 10g with improvement in potassium to 5.9.   -Check potassium every 4 hours x 2  -Additional Lokelma if repeat potassium elevated  -BMP in am    #PJP Pneumonia  Patient reports intermittent fever, chills and dypsnea on exertion [id number]. Peak temp at home 101.8F. Evaluated outpatient and started 7 day course of doxycycline. Extensive work up revealed PJP positive BAL 1/1/2024. Started on Bactrim 11/18 with plan for 21 day course. Most recent CT Chest with contrast on 11/21/2024 with scattered groundglass opacities within the bilateral lung fields suggestive of infection. Similar to CT 10/21/2024 but now slightly increased in the right lower lobe apex. Cased discussed with ID by ED provider and antibiotic changed to  "atovaquone.   -Stop bactrim  -Start atovaqyone 750 mg twice daily   -ID consult    #Acute lymphoblastic leukemia s/p VERÓNICA allo  #Chronic GVHD  Reviewed recent Oncology note from 11/22/2024. 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 History of oral GVHD but patient states dose not currently need his s/s dexamethasone  -Cont Refresh eye drops. Not currently using fluorometholone eye drops  -Cont prednisone 15 mg daily (took dose today)  -Oncology consult  -Cont penicillin V and acyclovir for prophylaxis     #Rhinovirus infection  Reports recent congestion and cough. Tested positive to rhinovirus 11/18 and 11/21 on viral panel  -Supportive care    #Anemia of chronic disease  Stable  -Monitor    #History of Grave's disease  -Cont synthroid  -Scheduled for virtual Endo appointment tomorrow.     #Hyperlipidemia  -Cont statin      Diet:  Regular   DVT Prophylaxis: Enoxaparin (Lovenox) SQ  Ga Catheter: Not present  Lines: PRESENT             Cardiac Monitoring: None  Code Status:  Full Code. Discussed with patient. Wife present.     Clinically Significant Risk Factors Present on Admission        # Hyperkalemia: Highest K = 6.2 mmol/L in last 2 days, will monitor as appropriate  # Hyponatremia: Lowest Na = 125 mmol/L in last 2 days, will monitor as appropriate  # Hypochloremia: Lowest Cl = 91 mmol/L in last 2 days, will monitor as appropriate   # Hypercalcemia: Highest Ca = 11.4 mg/dL in last 2 days, will monitor as appropriate        # Hypertension: Noted on problem list           # Obesity: Estimated body mass index is 35.62 kg/m  as calculated from the following:    Height as of 11/21/24: 1.854 m (6' 1\").    Weight as of 11/22/24: 122.5 kg (270 lb).              Disposition Plan     Medically Ready for Discharge: Anticipated in 2-4 Days         The patient's care was discussed with the Attending Physician, Dr. Villegas, Bedside Nurse, " Patient, and Patient's Family.    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   Patient reports he became ill 10/14 with intermittent fevers, chills, and dyspnea on exertion.  He reports shortness of breath with an up a flight of stairs or ambulating 1 block.  He denies any chest pain and no cough initially.  He was seen outpatient and treated with a course of doxycycline.  He continued to feel unwell and had extensive workup with BAL of 11/1/2024 which is positive for PJP.  He was seen by infectious disease specialist and started on Bactrim 1/18/2024.  He had labs 11/22 which revealed elevated creatinine from baseline (1.82), hyponatremia (131) and mild hyperkalemia (5.5).   He reports generally feeling well days could not sleep last night due to twitching of his legs.  He also reports decreased appetite and had vomiting x 2 today.    He had repeat labs today with results trending up with creatinine 1.87 and potassium level 6.2 with downward trending sodium at he was therefore sent to the emergency department.  EKG had no signs of peaked T waves.  In ED, he received calcium gluconate 1 g IV, Lasix 20 mg IV, regular insulin 10 units IV, Dextrose 25 g, and Lokelma 10g with improvement in potassium to 5.9.  He feels  slightly better. No other complaints at this time.         Past Medical History    Past Medical History:   Diagnosis Date    ALL (acute lymphocytic leukemia) (H)     CKD (chronic kidney disease)     GVHD (graft versus host disease) (H)     H/O peripheral stem cell transplant (H)     Hyperthyroidism 1996    Infection due to 2019 novel coronavirus 01/16/2023    Influenza A 11/2022    Postablative hypothyroidism 1997    Pulmonary embolism (H)     Renal cell carcinoma (H) 2007    right kidney    Sleep apnea        Past Surgical History   Past Surgical History:   Procedure Laterality Date    APPENDECTOMY      BACK SURGERY  2017    BONE MARROW BIOPSY, BONE SPECIMEN, NEEDLE/TROCAR Left 09/02/2021    Procedure: BIOPSY, BONE MARROW;  Surgeon: Jailyn Ny APRN CNP;  Location: [facility] OR    BONE MARROW BIOPSY, BONE SPECIMEN, NEEDLE/TROCAR Left 11/15/2021    Procedure: BIOPSY, BONE MARROW;  Surgeon: Socrates De La Torre;  Location: [facility] OR    BONE MARROW BIOPSY, BONE SPECIMEN, NEEDLE/TROCAR Left 02/07/2022    Procedure: BIOPSY, BONE MARROW;  Surgeon: Renetta Wiggins PA-C;  Location: [facility] OR    BONE MARROW BIOPSY, BONE SPECIMEN, NEEDLE/TROCAR Left 08/18/2022    Procedure: BIOPSY, BONE MARROW;  Surgeon: Lorrie Yap PA-C;  Location: [facility] OR    BONE MARROW BIOPSY, BONE SPECIMEN, NEEDLE/TROCAR Left 08/07/2023    Procedure: Bone marrow biopsy, bone specimen, needle/trocar;  Surgeon: Teressa Rick PA-C;  Location: [facility] OR    BRONCHOSCOPY (RIGID OR FLEXIBLE), DIAGNOSTIC N/A 04/29/2022    Procedure: BRONCHOSCOPY, WITH BRONCHOALVEOLAR LAVAGE;  Surgeon: Murtaza Garcia MD;  Location: [facility]    BRONCHOSCOPY (RIGID OR FLEXIBLE), DIAGNOSTIC N/A 11/1/2024    Procedure: BRONCHOSCOPY, WITH BRONCHOALVEOLAR LAVAGE;  Surgeon: Murtaza Garcia MD;  Location: [facility]    IR CVC TUNNEL PLACEMENT > 5 YRS OF AGE  08/04/2021    IR CVC TUNNEL REMOVAL LEFT  09/02/2021    IR LIVER BIOPSY PERCUTANEOUS  02/21/2022    NEPHRECTOMY  2007     ORTHOPEDIC SURGERY      bilateral shoulder surgeries    PERCUTANEOUS BIOPSY LIVER N/A 02/21/2022    Procedure: NEEDLE BIOPSY, LIVER, PERCUTANEOUS;  Surgeon: Trace Castellanos MD;  Location: [facility] OR    PHACOEMULSIFICATION WITH STANDARD INTRAOCULAR LENS IMPLANT Left 1/15/2024    Procedure: LEFT EYE PHACOEMULSIFICATION, CATARACT, WITH STANDARD INTRAOCULAR LENS IMPLANT INSERTION;  Surgeon: Deshawn Menezes MD;  Location: [facility] OR    PHACOEMULSIFICATION WITH STANDARD INTRAOCULAR LENS IMPLANT Right 2/5/2024    Procedure: RIGHT EYE PHACOEMULSIFICATION, CATARACT, WITH STANDARD INTRAOCULAR LENS IMPLANT INSERTION;  Surgeon: Deshawn Menezes MD;  Location: [facility] OR       Prior to Admission Medications   Prior to Admission Medications   Prescriptions Last Dose Informant Patient Reported? Taking?   Carboxymethylcell-Glycerin PF (REFRESH RELIEVA PF) 0.5-1 % SOLN   No No   Sig: Apply 1 drop to eye 4 times daily Preservative free. Either PF multidose bottle or PF vials   LORazepam (ATIVAN) 1 MG tablet   Yes No   Sig: Take 1 mg by mouth every 6 hours as needed for anxiety. PRN   Nutritional Supplements (ENSURE COMPLETE SHAKE) LIQD   Yes No   Sig: Take 11 mLs by mouth every morning   acyclovir (ZOVIRAX) 400 MG tablet   No No   Sig: Take 2 tablets (800 mg) by mouth every 12 hours   amLODIPine (NORVASC) 5 MG tablet   No No   Sig: Take 1 tablet (5 mg) by mouth daily   dexAMETHasone alcohol-free (DECADRON) 0.1 MG/ML solution   No No   Sig: Swish and spit 10 mLs (1 mg) in mouth 2 times daily.   doxycycline hyclate (VIBRAMYCIN) 100 MG capsule   No No   Sig: Take 1 capsule (100 mg) by mouth 2 times daily.   Patient not taking: Reported on 10/29/2024   escitalopram (LEXAPRO) 10 MG tablet   No No   Sig: Take 1 tablet (10 mg) by mouth daily   fluorometholone (FML LIQUIFILM) 0.1 % ophthalmic suspension   No No   Sig: Place 1 drop Into the left eye 3 times daily.   levothyroxine (SYNTHROID/LEVOTHROID) 112 MCG tablet   No No   Sig:  Take 1 tablet (112 mcg) by mouth daily   metroNIDAZOLE (METROCREAM) 0.75 % external cream   No No   Sig: Apply topically 2 times daily Apply to the nose.   nicotine polacrilex (NICORETTE) 4 MG gum   Yes No   Sig: Place 4 mg inside cheek daily.   omeprazole (PRILOSEC) 40 MG DR capsule   No No   Sig: Take 1 capsule (40 mg) by mouth daily   penicillin V (VEETID) 250 MG tablet   No No   Sig: Take 1 Tablet (250 mg) by mouth two times daily.   predniSONE (DELTASONE) 10 MG tablet   No No   Sig: Take 1 tablet (10 mg) by mouth daily   predniSONE (DELTASONE) 5 MG tablet   No No   Sig: Take 1 tablet (5 mg) by mouth daily. Currently taking 15 mg daily ( on taper)   rosuvastatin (CRESTOR) 5 MG tablet   No No   Sig: Take 1 tablet (5 mg) by mouth daily.   sennosides (SENOKOT) 8.6 MG tablet   No No   Sig: Take 1 tablet by mouth 3 times daily as needed for constipation   sodium chloride 0.9 % neb solution   No No   Sig: 3 mLs by Other route 2 times daily   sulfamethoxazole-trimethoprim (BACTRIM DS) 800-160 MG tablet   No No   Sig: Take 4 tablets by mouth 2 times daily for 21 days.   sulfamethoxazole-trimethoprim (BACTRIM DS) 800-160 MG tablet   No No   Sig: Take 3 tablets by mouth daily for 21 days.   tacrolimus (PROTOPIC) 0.1 % external ointment   No No   Sig: Apply topically 2 times daily      Facility-Administered Medications: None        Social History   Lives with his wife. Grown children with multiple grandchilden. Previous worked as an . Previous tobacco use but quit several years ago. Uses nicotine gum. No ETOH use in >3 years.        Physical Exam   Vital Signs: Temp: 97.7  F (36.5  C) Temp src: Oral BP: (!) 139/91 Pulse: 79   Resp: 20 SpO2: 97 % O2 Device: None (Room air)    Weight: 0 lbs 0 oz    General: Alert and oriented x 3. NAD. Appears comfortable   Resp: No signs of respiratory distress. Lungs clear to ausculation bilaterally without rales, rhonchi or wheezes.   Cardiac: RRR. S1 and S2 normal  intensity. No murmurs appreciated.   GI: Abdomen soft, non-tender, non-distended.  No masses, hepatomegaly or splenomegaly.   : No restrepo  Psych: Appropriate mood and affect. N=  Derm: Skin warm and dry. No rashes or skin breakdown. No jaundice.   Extremities: 2+ pitting edema bilaterally      Medical Decision Making       60 MINUTES SPENT BY ME on the date of service doing chart review, history, exam, documentation & further activities per the note.      Data     I have personally reviewed the following data over the past 24 hrs:    7.1  \   12.3 (L)   / 230     125 (L) 93 (L) 40.9 (H) /  113 (H)   5.9 (H) 18 (L) 1.83 (H) \     ALT: 40 AST: 40 AP: 102 TBILI: 0.2   ALB: 4.4 TOT PROTEIN: 7.3 LIPASE: N/A       Imaging results reviewed over the past 24 hrs:   No results found for this or any previous visit (from the past 24 hours).

## 2024-11-27 ENCOUNTER — DOCUMENTATION ONLY (OUTPATIENT)
Dept: OTHER | Facility: CLINIC | Age: 64
End: 2024-11-27
Payer: MEDICARE

## 2024-11-27 ENCOUNTER — DOCUMENTATION ONLY (OUTPATIENT)
Dept: ONCOLOGY | Facility: CLINIC | Age: 64
End: 2024-11-27
Payer: MEDICARE

## 2024-11-27 LAB
ANION GAP SERPL CALCULATED.3IONS-SCNC: 11 MMOL/L (ref 7–15)
ANION GAP SERPL CALCULATED.3IONS-SCNC: 12 MMOL/L (ref 7–15)
ANION GAP SERPL CALCULATED.3IONS-SCNC: 13 MMOL/L (ref 7–15)
ATRIAL RATE - MUSE: 82 BPM
BUN SERPL-MCNC: 39.3 MG/DL (ref 8–23)
BUN SERPL-MCNC: 39.8 MG/DL (ref 8–23)
BUN SERPL-MCNC: 39.9 MG/DL (ref 8–23)
C DIFF TOX B STL QL: NEGATIVE
CALCIUM SERPL-MCNC: 10.4 MG/DL (ref 8.8–10.4)
CALCIUM SERPL-MCNC: 9.8 MG/DL (ref 8.8–10.4)
CALCIUM SERPL-MCNC: 9.8 MG/DL (ref 8.8–10.4)
CHLORIDE SERPL-SCNC: 94 MMOL/L (ref 98–107)
CHLORIDE SERPL-SCNC: 95 MMOL/L (ref 98–107)
CHLORIDE SERPL-SCNC: 98 MMOL/L (ref 98–107)
CREAT SERPL-MCNC: 1.86 MG/DL (ref 0.67–1.17)
CREAT SERPL-MCNC: 1.95 MG/DL (ref 0.67–1.17)
CREAT SERPL-MCNC: 2.1 MG/DL (ref 0.67–1.17)
CREAT SERPL-MCNC: 2.31 MG/DL (ref 0.67–1.17)
DIASTOLIC BLOOD PRESSURE - MUSE: NORMAL MMHG
EGFRCR SERPLBLD CKD-EPI 2021: 30 ML/MIN/1.73M2
EGFRCR SERPLBLD CKD-EPI 2021: 34 ML/MIN/1.73M2
EGFRCR SERPLBLD CKD-EPI 2021: 37 ML/MIN/1.73M2
EGFRCR SERPLBLD CKD-EPI 2021: 39 ML/MIN/1.73M2
GLUCOSE BLDC GLUCOMTR-MCNC: 124 MG/DL (ref 70–99)
GLUCOSE SERPL-MCNC: 103 MG/DL (ref 70–99)
GLUCOSE SERPL-MCNC: 115 MG/DL (ref 70–99)
GLUCOSE SERPL-MCNC: 145 MG/DL (ref 70–99)
HCO3 SERPL-SCNC: 20 MMOL/L (ref 22–29)
HCO3 SERPL-SCNC: 21 MMOL/L (ref 22–29)
HCO3 SERPL-SCNC: 22 MMOL/L (ref 22–29)
INTERPRETATION ECG - MUSE: NORMAL
MAGNESIUM SERPL-MCNC: 2.2 MG/DL (ref 1.7–2.3)
OSMOLALITY UR: 327 MMOL/KG (ref 100–1200)
P AXIS - MUSE: 72 DEGREES
PHOSPHATE SERPL-MCNC: 4.3 MG/DL (ref 2.5–4.5)
POTASSIUM SERPL-SCNC: 4.6 MMOL/L (ref 3.4–5.3)
POTASSIUM SERPL-SCNC: 5 MMOL/L (ref 3.4–5.3)
POTASSIUM SERPL-SCNC: 5.4 MMOL/L (ref 3.4–5.3)
PR INTERVAL - MUSE: 168 MS
QRS DURATION - MUSE: 90 MS
QT - MUSE: 382 MS
QTC - MUSE: 446 MS
R AXIS - MUSE: 23 DEGREES
SODIUM SERPL-SCNC: 127 MMOL/L (ref 135–145)
SODIUM SERPL-SCNC: 129 MMOL/L (ref 135–145)
SODIUM SERPL-SCNC: 130 MMOL/L (ref 135–145)
SODIUM UR-SCNC: 34 MMOL/L
SYSTOLIC BLOOD PRESSURE - MUSE: NORMAL MMHG
T AXIS - MUSE: 79 DEGREES
VENTRICULAR RATE- MUSE: 82 BPM

## 2024-11-27 PROCEDURE — 250N000013 HC RX MED GY IP 250 OP 250 PS 637: Performed by: PHYSICIAN ASSISTANT

## 2024-11-27 PROCEDURE — 120N000005 HC R&B MS OVERFLOW UMMC

## 2024-11-27 PROCEDURE — 99233 SBSQ HOSP IP/OBS HIGH 50: CPT | Performed by: STUDENT IN AN ORGANIZED HEALTH CARE EDUCATION/TRAINING PROGRAM

## 2024-11-27 PROCEDURE — 250N000012 HC RX MED GY IP 250 OP 636 PS 637: Performed by: PHYSICIAN ASSISTANT

## 2024-11-27 PROCEDURE — 80048 BASIC METABOLIC PNL TOTAL CA: CPT | Performed by: STUDENT IN AN ORGANIZED HEALTH CARE EDUCATION/TRAINING PROGRAM

## 2024-11-27 PROCEDURE — 250N000013 HC RX MED GY IP 250 OP 250 PS 637: Performed by: STUDENT IN AN ORGANIZED HEALTH CARE EDUCATION/TRAINING PROGRAM

## 2024-11-27 PROCEDURE — 99233 SBSQ HOSP IP/OBS HIGH 50: CPT | Performed by: INTERNAL MEDICINE

## 2024-11-27 PROCEDURE — 258N000003 HC RX IP 258 OP 636: Performed by: STUDENT IN AN ORGANIZED HEALTH CARE EDUCATION/TRAINING PROGRAM

## 2024-11-27 PROCEDURE — 250N000011 HC RX IP 250 OP 636: Performed by: PHYSICIAN ASSISTANT

## 2024-11-27 PROCEDURE — 99233 SBSQ HOSP IP/OBS HIGH 50: CPT | Mod: GC | Performed by: INTERNAL MEDICINE

## 2024-11-27 PROCEDURE — 250N000011 HC RX IP 250 OP 636: Performed by: STUDENT IN AN ORGANIZED HEALTH CARE EDUCATION/TRAINING PROGRAM

## 2024-11-27 PROCEDURE — 83935 ASSAY OF URINE OSMOLALITY: CPT | Performed by: STUDENT IN AN ORGANIZED HEALTH CARE EDUCATION/TRAINING PROGRAM

## 2024-11-27 PROCEDURE — 83735 ASSAY OF MAGNESIUM: CPT | Performed by: STUDENT IN AN ORGANIZED HEALTH CARE EDUCATION/TRAINING PROGRAM

## 2024-11-27 PROCEDURE — 84100 ASSAY OF PHOSPHORUS: CPT | Performed by: STUDENT IN AN ORGANIZED HEALTH CARE EDUCATION/TRAINING PROGRAM

## 2024-11-27 PROCEDURE — 87493 C DIFF AMPLIFIED PROBE: CPT | Performed by: PHYSICIAN ASSISTANT

## 2024-11-27 PROCEDURE — 84300 ASSAY OF URINE SODIUM: CPT | Performed by: STUDENT IN AN ORGANIZED HEALTH CARE EDUCATION/TRAINING PROGRAM

## 2024-11-27 RX ORDER — ROSUVASTATIN CALCIUM 5 MG/1
5 TABLET, COATED ORAL DAILY
Qty: 30 TABLET | Refills: 3 | Status: SHIPPED | OUTPATIENT
Start: 2024-11-27

## 2024-11-27 RX ORDER — ATOVAQUONE 750 MG/5ML
750 SUSPENSION ORAL 2 TIMES DAILY
Qty: 130 ML | Refills: 0 | Status: SHIPPED | OUTPATIENT
Start: 2024-11-27 | End: 2024-12-10

## 2024-11-27 RX ORDER — ACYCLOVIR 400 MG/1
400 TABLET ORAL EVERY 12 HOURS
COMMUNITY
Start: 2024-11-27

## 2024-11-27 RX ADMIN — PENICILLIN V POTASSIUM 250 MG: 250 TABLET, FILM COATED ORAL at 20:28

## 2024-11-27 RX ADMIN — ATOVAQUONE 750 MG: 750 SUSPENSION ORAL at 09:49

## 2024-11-27 RX ADMIN — PENICILLIN V POTASSIUM 250 MG: 250 TABLET, FILM COATED ORAL at 07:43

## 2024-11-27 RX ADMIN — HEPARIN, PORCINE (PF) 10 UNIT/ML INTRAVENOUS SYRINGE 5 ML: at 03:25

## 2024-11-27 RX ADMIN — ATOVAQUONE 750 MG: 750 SUSPENSION ORAL at 20:27

## 2024-11-27 RX ADMIN — PANTOPRAZOLE SODIUM 40 MG: 40 TABLET, DELAYED RELEASE ORAL at 16:38

## 2024-11-27 RX ADMIN — Medication 1 DROP: at 16:38

## 2024-11-27 RX ADMIN — ROSUVASTATIN CALCIUM 5 MG: 5 TABLET, FILM COATED ORAL at 22:02

## 2024-11-27 RX ADMIN — Medication 1 DROP: at 11:52

## 2024-11-27 RX ADMIN — SENNOSIDES AND DOCUSATE SODIUM 2 TABLET: 50; 8.6 TABLET ORAL at 10:11

## 2024-11-27 RX ADMIN — NICOTINE POLACRILEX 2 MG: 2 GUM, CHEWING BUCCAL at 00:03

## 2024-11-27 RX ADMIN — PANTOPRAZOLE SODIUM 40 MG: 40 TABLET, DELAYED RELEASE ORAL at 07:43

## 2024-11-27 RX ADMIN — SODIUM CHLORIDE 500 ML: 9 INJECTION, SOLUTION INTRAVENOUS at 15:04

## 2024-11-27 RX ADMIN — SENNOSIDES 1 TABLET: 8.6 TABLET, FILM COATED ORAL at 22:02

## 2024-11-27 RX ADMIN — Medication 1 DROP: at 07:43

## 2024-11-27 RX ADMIN — PREDNISONE 15 MG: 10 TABLET ORAL at 07:43

## 2024-11-27 RX ADMIN — LORAZEPAM 1 MG: 1 TABLET ORAL at 22:02

## 2024-11-27 RX ADMIN — ESCITALOPRAM OXALATE 10 MG: 10 TABLET ORAL at 22:02

## 2024-11-27 RX ADMIN — HEPARIN, PORCINE (PF) 10 UNIT/ML INTRAVENOUS SYRINGE 5 ML: at 17:51

## 2024-11-27 RX ADMIN — SODIUM ZIRCONIUM CYCLOSILICATE 20 G: 10 POWDER, FOR SUSPENSION ORAL at 15:01

## 2024-11-27 RX ADMIN — ACYCLOVIR 400 MG: 400 TABLET ORAL at 07:49

## 2024-11-27 RX ADMIN — ENOXAPARIN SODIUM 40 MG: 40 INJECTION SUBCUTANEOUS at 20:28

## 2024-11-27 RX ADMIN — HEPARIN, PORCINE (PF) 10 UNIT/ML INTRAVENOUS SYRINGE 5 ML: at 12:27

## 2024-11-27 RX ADMIN — LEVOTHYROXINE SODIUM 112 MCG: 0.11 TABLET ORAL at 07:43

## 2024-11-27 RX ADMIN — ACYCLOVIR 400 MG: 400 TABLET ORAL at 20:28

## 2024-11-27 ASSESSMENT — ACTIVITIES OF DAILY LIVING (ADL)
ADLS_ACUITY_SCORE: 30
ADLS_ACUITY_SCORE: 36
ADLS_ACUITY_SCORE: 36
ADLS_ACUITY_SCORE: 30
ADLS_ACUITY_SCORE: 30
ADLS_ACUITY_SCORE: 36
DEPENDENT_IADLS:: INDEPENDENT
ADLS_ACUITY_SCORE: 30
ADLS_ACUITY_SCORE: 36
ADLS_ACUITY_SCORE: 36
ADLS_ACUITY_SCORE: 30
ADLS_ACUITY_SCORE: 36
ADLS_ACUITY_SCORE: 36
ADLS_ACUITY_SCORE: 30
ADLS_ACUITY_SCORE: 36
ADLS_ACUITY_SCORE: 30
ADLS_ACUITY_SCORE: 36
ADLS_ACUITY_SCORE: 30

## 2024-11-27 NOTE — PROGRESS NOTES
Transplant Infectious Diseases Inpatient Consultation      Nik Nava MRN# [id number]   YOB: 1960 Age: 64 year old   Date of Admission and time: 11/25/2024  5:22 PM     Reason for consult: Hyperkalemia from TMP/SMX use in setting of PJP        Recommendations:   Continue atovaquone 750 mg BID for 14 days (Dates: 11/26-12/10)  Once he has finished his atovaquone, transition to TMP//800 (DS) three times weekly for ongoing PPx  Appreciate nephrology and medicine's assistance with managing hyperkalemia and elevated creatinine   Appreciate BMT's assistance with tapering steroids as able  Continue acyclovir for recurrent oral herpes prevention with dose reduced to 400 mg BID, given NEGRA.    Continue penicillin PPx   Would give him MenACWY and MenB at discharge.   He will need a second dose of MenACWY >8 weeks later.   The MenB schedule depends upon the brand that he receives     Follow-up with me in clinic as scheduled on 12/10/2024. ID will sign off at this time. Please do not hesitate to consult us with any additional questions or concerns    Attestation:  Total duration of visit including chart review, reviewing labs and imaging, interviewing and examining the patient, documentation, and sending communication to the primary treating team, all at the same day of this encounter, is: 53 minutes.       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 He is is currently on 15 mg/day prednisone and dex swish and swallow. He was recently seen by me in ID clinic for unexplained fevers and pulmonary GGOs. Ultimately found to have positive PJP PCR on BAL from 11/1/2024. Started on 15 mg/kg/day of TMP/SMX on 11/18/24. Now admitted for TMP/SMX-induced  hyperkalemia, for which we are being consulted.         Active Problems and Infectious Diseases Issues:   #PJP on BAL from 11/1/2024 in setting of fevers and pulmonary GGOs  #Hyperkalemia and elevated creatinine after starting TMP/SMX   Recently experienced unexplained fevers from 10/24-11/5 with CT chest (10/21) with peribronchovascular GGOs. BAL from 11/1 positive for PJP. Started on TMP/SMX at 15 mg/kg/day on 11/18/24. Developed nausea, vomiting, and hyperkalemia on 11/25/24. Transitioned to atovaquone on 11/25. Will plan for 21 day course of treatment (Dates: 11/18-12/10). He can transition to TMP/SMX prophylaxis indefinitely thereafter.     #History of Possible Rash with TMP/SMX  Previously experienced a maculopapular rash around the time that he started TMP/SMX. However, did not develop a rash with rechallenge on high-dose TMP/SMX, so have removed this from his allergy list.     #Rhinovirus  Developed this after visiting his grandson. Tested positive on 11/18/24. Symptoms minimal at this point. Not treatment needed.     #Functional asplenia  Is up-to-date on his pneumonia and HIB vaccines. Needs meningitis vaccine series. Is currently on PCN prophylaxis.     Transplant Checklist  - QTc interval: 444 (11/25/24)  - Pneumocystis prophylaxis: Previously on pentamidine, but stopped in 6/2023. PJP PCR from Bal positive on 11/1/2024. Started on TMP/SMX at 15 mg/kg/day on 11/18/24. Transitioned to atovaquone on 11/25/24 due to hyperkalemia.   - Bacterial prophylaxis: Penicillin  - Fungal prophylaxis: None  - Serostatus & viral prophylaxis: CMV D+/R+, HSV ?, EBV + Acyclovir. Developed recurrent oral HSV after stopping acyclovir.   - Immunization status:  Up-to-date on seasonal COVID-19 and influenza, Shingrix, RSV (8/2024), PCV-20 (5/2024), HBV, and Tetanus (5/2024).   - Gamma globulin status:   Recent Labs   Lab Test 10/25/24  1303   *     - Antibiotic allergies: None. Previously listed as TMP/SMX with a rash,  but I have discontinued this.         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 Bone biopsy from 3/31/23 without culture growth. No cellular material present. Treated with 8 weeks IV CTX (end 5/22/2023). Re=admitted 8/2023 with increasing back pain and progression of discitis/osteomyelitis. Treated with vancomycin and ertapenem for 8 weeks. Transitioned to PO clindamycin-->doxycycline and Augmentin for an additional 4 weeks. Repeat spinal imaging on 11/29 with significant improvement.    COVID-19 x2: Diagnosed last in 1/2023. Treated with Paxlovid  CMV Viremia: Diagnosed in 4/2022. Treated with Valcyte 900 mg BID.   Oral Herpes : Initialy diagnosed in 12/2023. Treated with Valtrex with subsequent recurrence. Has been on acyclvoir PPx since 1/20/2024 without recurrence.   RSV PNA (3/26/2024): S/p ribavirin       Interval Events   Since seen by me on 11/26/2024, he has been doing well. Feeling well and ready to discharge as soon as able. Creatinine improved this morning. No more nausea. K was mildly elevated again yesterday, but has improved again today.          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 He is is currently on 15 mg/day prednisone and dex swish and swallow. He was recently seen by me in ID clinic for unexplained fevers and pulmonary GGOs. Ultimately found to have positive PJP PCR on BAL from 11/1/2024. Started on 15 mg/kg/day of TMP/SMX on 11/18/24. Now admitted for TMP/SMX-induced hyperkalemia, for which we are being consulted.     Was doing well until 11/24 when he developed nausea and leg twitching. Vomited x2 on 11/25. Had labs drawn and found to have Cr=1.87 and K=6.2. Called to come to the ED, which he did on 11/25. Treated with  calcium gluconate, Laxis, insulin, dextrose, and Lokelma with improvement in K. TMP/SMX discontinued and he was transitioned to atovaquone per my recommendations.     Today he is feeling well. No acute complaints. Denies fevers/chills. Has no cough.               Immunizations:     Immunization History   Administered Date(s) Administered    COVID-19 12+ (MODERNA) 09/24/2024    COVID-19 12+ (Pfizer) 11/14/2023    COVID-19 Bivalent 12+ (Pfizer) 11/16/2022    COVID-19 MONOVALENT 12+ (Pfizer) 11/18/2021, 12/14/2021    DTAP,IPV,HIB,HEPB (VAXELIS) 05/09/2024    DTaP/HepB/IPV 11/14/2023    HIB (PRP-T) 11/14/2023    Influenza Vaccine 18-64 (Flublok) 10/12/2021    Influenza Vaccine 65+ (FLUAD) 09/08/2023    Pneumo Conj 13-V (2010&after) 11/14/2023    Pneumococcal 20 valent Conjugate (Prevnar 20) 05/09/2024    TDAP (Adacel,Boostrix) 10/27/2016    Zoster recombinant adjuvanted (SHINGRIX) 11/14/2023, 01/09/2024            Allergies:     No Active Allergies            Medications:   Medications that Require Transfusion:   Current Facility-Administered Medications   Medication Dose Route Frequency Provider Last Rate Last Admin     Scheduled Medications:   Current Facility-Administered Medications   Medication Dose Route Frequency Provider Last Rate Last Admin    acyclovir (ZOVIRAX) tablet 400 mg  400 mg Oral Q12H Skyler Baugh MD   400 mg at 11/27/24 0749    [Held by provider] amLODIPine (NORVASC) tablet 5 mg  5 mg Oral At Bedtime Leeann Dudley PA-C   5 mg at 11/25/24 2231    atovaquone (MEPRON) suspension 750 mg  750 mg Oral BID Leeann Dudley PA-C   750 mg at 11/27/24 0949    carboxymethylcellulose PF (REFRESH PLUS) 0.5 % ophthalmic solution 1 drop  1 drop Ophthalmic 4x Daily Leeann Dudley PA-C   1 drop at 11/27/24 0743    enoxaparin ANTICOAGULANT (LOVENOX) injection 40 mg  40 mg Subcutaneous Q24H Marzolf, Leeann, PA-C   40 mg at 11/26/24 2121    escitalopram (LEXAPRO) tablet 10 mg  10 mg Oral At Bedtime Octavia  "MIREYA Bradshaw   10 mg at 11/26/24 2119    heparin lock flush 10 unit/mL injection 5-10 mL  5-10 mL Intracatheter Q24H Jayro Koehler MD        heparin lock flush 100 unit/mL injection 5-10 mL  5-10 mL Intracatheter Q28 Days Jayor Koehler MD        levothyroxine (SYNTHROID/LEVOTHROID) tablet 112 mcg  112 mcg Oral Daily Leeann Dudley PA-C   112 mcg at 11/27/24 0743    pantoprazole (PROTONIX) EC tablet 40 mg  40 mg Oral BID AC Kobi Villegas MD   40 mg at 11/27/24 0743    penicillin V (VEETID) tablet 250 mg  250 mg Oral BID Leeann Dudley PA-C   250 mg at 11/27/24 0743    predniSONE (DELTASONE) tablet 15 mg  15 mg Oral Daily Leeann Dudley PA-C   15 mg at 11/27/24 0743    rosuvastatin (CRESTOR) tablet 5 mg  5 mg Oral At Bedtime Leeann Dudley PA-C   5 mg at 11/26/24 2119    sennosides (SENOKOT) tablet 1 tablet  1 tablet Oral At Bedtime Leeann Dudley PA-C   1 tablet at 11/26/24 2120    sodium chloride (PF) 0.9% PF flush 10-20 mL  10-20 mL Intracatheter Q28 Days Jayro Koehler MD        sodium chloride (PF) 0.9% PF flush 3 mL  3 mL Intracatheter Q8H Leeann Dudley PA-C   3 mL at 11/26/24 2246          Physical Exam     Physical Exam     Vital signs:  /75 (BP Location: Left arm)   Pulse 95   Temp 97.5  F (36.4  C) (Oral)   Resp 16   Ht 1.83 m (6' 0.05\")   Wt 117.1 kg (258 lb 1.6 oz)   SpO2 95%   BMI 34.96 kg/m      GENERAL: alert and no distress  NECK: no adenopathy, no asymmetry, masses, or scars  RESP: lungs clear to auscultation - no rales, rhonchi or wheezes  CV: regular rate and rhythm, normal S1 S2, no S3 or S4, no murmur, click or rub, no peripheral edema  ABDOMEN: soft, nontender, no hepatosplenomegaly, no masses and bowel sounds normal  MS: no gross musculoskeletal defects noted, no edema  Data     Data   Laboratory data and imaging listed below was reviewed prior to this encounter.     Microbiology:    Culture   Date Value Ref Range Status   11/01/2024 No growth after 24 " "days  Preliminary   11/01/2024 No growth after 25 days  Preliminary   11/01/2024 2+ Normal tono  Final   11/01/2024 No growth after 17 days  Preliminary   11/01/2024 No growth after 25 days  Preliminary   11/01/2024 1+ Normal tono  Final   10/15/2024 No Growth  Final   10/15/2024 No Growth  Final   04/29/2022 No Growth  Final   04/29/2022 No Growth  Final   04/29/2022 1+ Normal tnoo  Final   09/12/2021 No Growth  Final   08/17/2021   Final    No Vancomycin Resistant Enterococcus species isolated     GS Culture   Date Value Ref Range Status   11/01/2024 See corresponding culture for results  Final   11/01/2024 See corresponding culture for results  Final   , CD4:   Recent Labs   Lab Test 10/18/24  1231 05/09/24  1404   CD3T 62 69   ACD3 1,673 2,077   CD8 43* 50*   ACD8 1,156 1,507*   CD4 18* 20*   ACD4 496 590   CDTHTS 0.43* 0.39*    and HIV RNA: No lab results found., Hepatitis B Testing:   Recent Labs   Lab Test 02/21/22  1214 07/08/21  1055   HBCAB Nonreactive Nonreactive   HEPBANG Nonreactive Nonreactive   , Hepatitis C Testing:   Hepatitis C Antibody   Date Value Ref Range Status   02/21/2022 Nonreactive Nonreactive Final   07/08/2021 Nonreactive NR^Nonreactive Final     Comment:     Assay performance characteristics have not been established for newborns,   infants, and children     , PJP PCR:   Recent Labs   Lab Test 11/01/24  1017   PJRDFA Detected*   , TB Quant: No lab results found.    Invalid input(s): \"[id number]\", \"[id number]\", Respiratory Viral Panel:   Recent Labs   Lab Test 11/21/24  1555 11/18/24  1532 10/25/24  1341 10/15/24  1202 12/21/23  1136 05/18/22  1228   ADENOV Not Detected Not Detected Not Detected Not Detected Not Detected Not Detected   CORONA Not Detected Not Detected Not Detected Not Detected Not Detected Not Detected   HMPV Not Detected Not Detected Not Detected Not Detected Not Detected Not Detected   RHINEV Detected* Detected* Not Detected Not Detected Detected* Not Detected "   IFLUA Not Detected Not Detected Not Detected Not Detected Not Detected Not Detected   FLUAH1 Not Detected Not Detected Not Detected Not Detected Not Detected Not Detected   JR3008 Not Detected Not Detected Not Detected Not Detected Not Detected Not Detected   FLUAH3 Not Detected Not Detected Not Detected Not Detected Not Detected Not Detected   IFLUB Not Detected Not Detected Not Detected Not Detected Not Detected Not Detected   PIV1 Not Detected Not Detected Not Detected Not Detected Not Detected Not Detected   PIV2 Not Detected Not Detected Not Detected Not Detected Not Detected Not Detected   PIV3 Not Detected Not Detected Not Detected Not Detected Not Detected Not Detected   RSVA Not Detected Not Detected Not Detected Not Detected Not Detected Not Detected   RSVB Not Detected Not Detected Not Detected Not Detected Not Detected Not Detected   CHLPNE Not Detected Not Detected Not Detected Not Detected Not Detected Not Detected   MYCPNE Not Detected Not Detected Not Detected Not Detected Not Detected Not Detected   , and C Diff: No lab results found., Immune Globulin Studies:   Recent Labs   Lab Test 10/25/24  1303 10/18/24  1042 10/15/24  1202 12/27/22  1136 08/18/22  1030 04/12/22  0951 11/15/21  0930   * 574* 587*   < > 652   < > 894   IGM  --   --   --   --  38  --  88   IGA  --   --   --   --  77*  --  231    < > = values in this interval not displayed.   , HSV 1/2 IgG:   Recent Labs   Lab Test 07/08/21  1055   H1IGG >8.0*   H2IGG <0.2    and VZV IgG: No lab results found., Quantitative CMV PCR:   Recent Labs   Lab Test 10/25/24  1446 03/13/24  1117 02/27/24  1323 03/01/23  1114 12/13/22  1402 11/22/22  0817 11/08/22  1446 10/18/22  1412 10/11/22  1519 09/20/22  1509 09/06/22  1309 08/18/22  1126 07/13/22  1107 06/21/22  1555 06/07/22  0953 05/26/22  1210 05/18/22  1116 05/10/22  1040 05/04/22  0942 04/29/22  1129 04/28/22  1408 04/20/22  0833 03/31/22  1550 03/16/22  1019 03/09/22  1137  03/09/22  1137 03/01/22  0746 02/24/22  1714 02/21/22  1214 02/16/22  0953 02/07/22  0945 01/04/22  0953 12/07/21  1117 11/30/21  1023 10/26/21  0955 10/19/21  0926 10/14/21  0823 10/08/21  1203 09/14/21  1030 09/09/21  1011 09/07/21  1530 09/04/21  0924 09/02/21  0734 08/30/21  0841 08/26/21  1119 08/18/21  0358 08/11/21  0405 08/04/21  0351   CMVQNT Not Detected Not Detected Not Detected Not Detected Not Detected Not Detected <137* Not Detected Not Detected Not Detected Not Detected Not Detected Not Detected Not Detected Not Detected Not Detected Not Detected Not Detected Not Detected Not Detected Not Detected <137*  --  <137*  --  <137* Not Detected Not Detected Not Detected Not Detected Not Detected Not Detected Not Detected Not Detected Not Detected Not Detected Not Detected Not Detected Not Detected Not Detected Not Detected Not Detected Not Detected Not Detected Not Detected Not Detected Not Detected Not Detected   CMVLOG  --   --   --   --   --   --  <2.1  --   --   --   --   --   --   --   --   --   --   --   --   --   --  <2.1   < > <2.1   < > <2.1  --   --   --   --   --   --   --   --   --   --   --   --   --   --   --   --   --   --   --   --   --   --     < > = values in this interval not displayed.    and CMV IgG:   Recent Labs   Lab Test 07/08/21  1055   CMVIGG >8.0*   , and Quantitative EBV PCR:   Recent Labs   Lab Test 03/13/24  1117 02/27/24  1323 03/28/23  1548 03/01/23  1114 11/08/22  1446 10/11/22  1519 08/18/22  1030 07/13/22  1107 06/07/22  0953 05/10/22  1040 05/04/22  0942 04/28/22  1408 04/22/22  0845 04/20/22  0833 02/21/22  1214 09/14/21  1030   EBRES  --   --   --   --  Not Detected Not Detected  --  Not Detected Not Detected Not Detected Not Detected <500*  --   --   --  <500*   EBLOG 2.7 3.1 2.9 3.0  --   --  3.0  --   --   --   --  <2.7 4.8 4.7   < > <2.7    < > = values in this interval not displayed.    and EBV EA IgG: No lab results found.                    Alba Markham,  MD [facility]  Contact information available via [facility] Paging/Directory     11/27/2024

## 2024-11-27 NOTE — PROGRESS NOTES
Prior Authorization Approval    Medication: ATOVAQUONE 750 MG/5ML PO SUSP  PA Initiated: 11/27/2024  PA Type: Clinical    Insurance: [employer] Part D - Phone [phone] Fax [phone]  [facility] / Reference #: [id number] / [id number]   Authorization Effective Dates: 1/1/2024 - 2/25/2025    Expected CoPay: $ 38.89  CoPay Card Eligible: No    Filling Pharmacy: [facility] [address]  Comments:       Natty Fisher  [facility] Liaison, M-Z  Ph: [phone]  Fax: [phone]  Available on Teams and Vocera

## 2024-11-27 NOTE — CONSULTS
Care Management Initial Consult    General Information  Assessment completed with: Patient, Spouse or significant other, Lamar  Type of CM/SW Visit: Initial Assessment    Primary Care Provider verified and updated as needed: Yes   Readmission within the last 30 days: previous discharge plan unsuccessful   Return Category: Progression of disease  Reason for Consult: discharge planning  Advance Care Planning: Advance Care Planning Reviewed: present on chart, verified with patient          Communication Assessment  Patient's communication style: spoken language (English or Bilingual)    Hearing Difficulty or Deaf: no   Wear Glasses or Blind: yes    Cognitive  Cognitive/Neuro/Behavioral: WDL                      Living Environment:   People in home: spouse  Lamar  Current living Arrangements: house      Able to return to prior arrangements: yes       Family/Social Support:  Care provided by: spouse/significant other  Provides care for: no one  Marital Status:   Support system: Wife  Lamar       Description of Support System: Supportive, Involved    Support Assessment: Adequate family and caregiver support, Adequate social supports    Current Resources:   Patient receiving home care services: No        Community Resources: None  Equipment currently used at home: none  Supplies currently used at home: None    Employment/Financial:  Employment Status: retired        Financial Concerns: none   Referral to Financial Worker: No       Does the patient's insurance plan have a 3 day qualifying hospital stay waiver?  No    Lifestyle & Psychosocial Needs:  Social Drivers of Health     Food Insecurity: Low Risk  (11/27/2024)    Food Insecurity     Within the past 12 months, did you worry that your food would run out before you got money to buy more?: No     Within the past 12 months, did the food you bought just not last and you didn t have money to get more?: No   Depression: Not at risk (2/6/2024)    Received from [facility]    PHQ-2     PHQ-2 TOTAL SCORE: 0   Housing Stability: Low Risk  (11/27/2024)    Housing Stability     Do you have housing? : Yes     Are you worried about losing your housing?: No   Recent Concern: Housing Stability - High Risk (11/27/2024)    Housing Stability     Do you have housing? : No     Are you worried about losing your housing?: No   Tobacco Use: Medium Risk (11/21/2024)    Patient History     Smoking Tobacco Use: Former     Smokeless Tobacco Use: Never     Passive Exposure: Past   Financial Resource Strain: Low Risk  (11/27/2024)    Financial Resource Strain     Within the past 12 months, have you or your family members you live with been unable to get utilities (heat, electricity) when it was really needed?: No   Alcohol Use: Not on file   Transportation Needs: Low Risk  (11/27/2024)    Transportation Needs     Within the past 12 months, has lack of transportation kept you from medical appointments, getting your medicines, non-medical meetings or appointments, work, or from getting things that you need?: No   Physical Activity: Not on file   Interpersonal Safety: Low Risk  (11/25/2024)    Interpersonal Safety     Do you feel physically and emotionally safe where you currently live?: Yes     Within the past 12 months, have you been hit, slapped, kicked or otherwise physically hurt by someone?: No     Within the past 12 months, have you been humiliated or emotionally abused in other ways by your partner or ex-partner?: No   Stress: Not on file   Social Connections: Socially Integrated (5/30/2024)    Received from [facility]    Social Connections     Do you often feel lonely or isolated from those around you?: 0   Health Literacy: Not on file       Functional Status:  Prior to admission patient needed assistance:   Dependent ADLs:: Independent  Dependent IADLs:: Independent  Assesssment of  Functional Status: Not at baseline with ADL Functioning    Mental Health Status:  Mental Health Status: Current Concern  Mental Health Management: Medication    Chemical Dependency Status:  Chemical Dependency Status: No Current Concerns             Values/Beliefs:  Spiritual, Cultural Beliefs, [employer] Practices, Values that affect care: no  Description of Beliefs that Will Affect Care: jeremias            Discussed  Partnership in Safe Discharge Planning  document with patient/family: No    Additional Information:  Writer met with pt and pt spouse Lamar at bed side and introduced self and explain social work role. Writer met with pt in order to complete case management initial assessment. Pt is currently residing at a house and lives with spouse. Pt was not receiving services prior to admission.    At baseline, pt IS independent with ADLs. Writer confirmed pt contact information and housing was accurate as of this admission. Pt has a HCD on file and reviewed.  Writer reviewed insurance and PCP information on file and confirmed this is accurate in the chart. Pt spouse Lamar requested information on GVHD support groups. Writer provided virtual and in person support group options.       Next Steps: No further care management intervention anticipated at this time.  Please re-consult if further needs arise.  Care management signing off.          ALPHONSE Mills   CoverinA NIYAH  Ph: [phone]  [facility]  Care Management Department    Securely message me on Vocera

## 2024-11-27 NOTE — PLAN OF CARE
"    [id number]      Afebrile. O2 stable RA. BP stable. HR appears to be in NSR, pt remains on continuous tele due to elevated K+. Pt is up with stand by assist. Denied pain, N/V. Appetite and oral intake are good. Pt voiding without issue, no BM since 11/23. Pt took PRN dose of senna to help facilitate BM. 1600 K+ came back 5.7 and Na down to 122. Notified provider Dr. Koehler. Additional sodium zirconium cyclosilicate dose ordered and given. Pt to have another K+ check at 2100. Continue plan of care.       Problem: Adult Inpatient Plan of Care  Goal: Plan of Care Review  Description: The Plan of Care Review/Shift note should be completed every shift.  The Outcome Evaluation is a brief statement about your assessment that the patient is improving, declining, or no change.  This information will be displayed automatically on your shift  note.  11/26/2024 1858 by Lashanda De Luna RN  Outcome: Progressing  11/26/2024 1830 by Lashanda De Luna RN  Outcome: Progressing  Goal: Patient-Specific Goal (Individualized)  Description: You can add care plan individualizations to a care plan. Examples of Individualization might be:  \"Parent requests to be called daily at 9am for status\", \"I have a hard time hearing out of my right ear\", or \"Do not touch me to wake me up as it startles  me\".  11/26/2024 1858 by Lashanda De Luna, RN  Outcome: Progressing  11/26/2024 1830 by Lashanda De Luna RN  Outcome: Progressing  Goal: Absence of Hospital-Acquired Illness or Injury  11/26/2024 1858 by Lashanda De Luna, RN  Outcome: Progressing  11/26/2024 1830 by Lashanda De Luna, RN  Outcome: Progressing  Intervention: Identify and Manage Fall Risk  Recent Flowsheet Documentation  Taken 11/26/2024 1800 by Lashanda De Luna, RN  Safety Promotion/Fall Prevention: safety round/check completed  Taken 11/26/2024 1600 by Lashanda De Luna, RN  Safety Promotion/Fall Prevention:   assistive device/personal items within reach   clutter " free environment maintained   nonskid shoes/slippers when out of bed   patient and family education   room near nurse's station   room organization consistent   safety round/check completed  Intervention: Prevent Skin Injury  Recent Flowsheet Documentation  Taken 11/26/2024 1600 by Lashanda De Luna RN  Body Position: position changed independently  Skin Protection: adhesive use limited  Intervention: Prevent and Manage VTE (Venous Thromboembolism) Risk  Recent Flowsheet Documentation  Taken 11/26/2024 1600 by Lashanda De Luna RN  VTE Prevention/Management: SCDs off (sequential compression devices)  Intervention: Prevent Infection  Recent Flowsheet Documentation  Taken 11/26/2024 1600 by Lashanda De Luna RN  Infection Prevention:   cohorting utilized   hand hygiene promoted  Goal: Optimal Comfort and Wellbeing  11/26/2024 1858 by Lashanda De Luna RN  Outcome: Progressing  11/26/2024 1830 by Lashanda De Luna RN  Outcome: Progressing  Goal: Readiness for Transition of Care  11/26/2024 1858 by Lashanda De Luna RN  Outcome: Progressing  11/26/2024 1830 by Lashanda De Luna RN  Outcome: Progressing   Goal Outcome Evaluation:

## 2024-11-27 NOTE — PLAN OF CARE
"Alert and oriented x 4. Denies pain and nausea. Telemetry in NSR. Up to the BR and voiding. Potassium was 4.6.  Problem: Adult Inpatient Plan of Care  Goal: Plan of Care Review  Description: The Plan of Care Review/Shift note should be completed every shift.  The Outcome Evaluation is a brief statement about your assessment that the patient is improving, declining, or no change.  This information will be displayed automatically on your shift  note.  Outcome: Progressing  Goal: Patient-Specific Goal (Individualized)  Description: You can add care plan individualizations to a care plan. Examples of Individualization might be:  \"Parent requests to be called daily at 9am for status\", \"I have a hard time hearing out of my right ear\", or \"Do not touch me to wake me up as it startles  me\".  Outcome: Progressing  Goal: Absence of Hospital-Acquired Illness or Injury  Outcome: Progressing  Intervention: Identify and Manage Fall Risk  Recent Flowsheet Documentation  Taken 11/27/2024 0000 by Gisella Wilkinson RN  Safety Promotion/Fall Prevention:   activity supervised   assistive device/personal items within reach   clutter free environment maintained   nonskid shoes/slippers when out of bed   room near nurse's station   safety round/check completed  Intervention: Prevent Skin Injury  Recent Flowsheet Documentation  Taken 11/27/2024 0000 by Gisella Wilkinson RN  Body Position: position changed independently  Skin Protection: adhesive use limited  Intervention: Prevent and Manage VTE (Venous Thromboembolism) Risk  Recent Flowsheet Documentation  Taken 11/27/2024 0000 by Gisella Wilkinson RN  VTE Prevention/Management: SCDs off (sequential compression devices)  Intervention: Prevent Infection  Recent Flowsheet Documentation  Taken 11/27/2024 0000 by Gisella Wilkinson RN  Infection Prevention:   environmental surveillance performed   hand hygiene promoted   personal protective equipment utilized   rest/sleep " promoted  Goal: Optimal Comfort and Wellbeing  Outcome: Progressing  Goal: Readiness for Transition of Care  Outcome: Progressing   Goal Outcome Evaluation:

## 2024-11-27 NOTE — PLAN OF CARE
[id number]    Neuro: Alert and Oriented   Cardiac: Sinus Rhythm, apical and radial pulse regular    Respiratory: Room air   GI/: Bowel movement 3 days ago.   Diet/appetite: Regular diet   Activity:  Independent, stand-by when hooked up  Pain: Denies   Skin: Bruising bilateral both arms. Redness blanchable on bottom and groin location  LDA's:R Port single lumen, heparin locked     Plan: Monitor, keep eye on Potassium (last result 5.3)

## 2024-11-27 NOTE — PROGRESS NOTES
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     Interval changes:  - NEGRA initially improving this morning, but midday labs with Cr 2.3, hyperK 5.4, hypoNa worsening at 127. Discussed with nephro. Giving 500 ml NS and repeating urine studies. Giving lokelma for hyperK.  - q4h labs for now  - meningitis vaccines per ID prior to discharge  - cont atovaquone and decreased dosing acyclovir    #Acute on chronic kidney disease with Hyperkalemia 2/2 bactrim use  #Hyponatremia  Nik was diagnosed with PJP pneumonia after BAL on 11/1/2024. He was started on Bactrim 11/18 with plan for 21 day course. He had labs 11/22 which revealed elevated Cr from baseline (1.82), hyponatremia (131) and mild hyperkalemia (5.5). He had repeat outpatient labs today with results trending up with Cr 1.87 and potassium level 6.2 with trending down sodium level at 127. and therefore was sent to ED. No EKG changes. Patient reports general malaise/feeling unwell with worsening leg movements/jittery and anorexia. He had vomiting x 2 today. In ED, he received calcium gluconate 1 g IV, Lasix 20 mg IV, regular insulin 10 units IV, Dextrose 25 g, and Lokelma 10g with improvement in potassium to 5.9.   - BID BMP  - cont PRN lokelma for mild hyperK  - nephrology consult recs pending    #PJP Pneumonia  Patient reports intermittent fever, chills and dypsnea on exertion [id number]. Peak temp at home 101.8F. Evaluated outpatient and started 7 day course of doxycycline. Extensive work up revealed PJP positive BAL 1/1/2024. Started on Bactrim 11/18 with  plan for 21 day course. Most recent CT Chest with contrast on 11/21/2024 with scattered groundglass opacities within the bilateral lung fields suggestive of infection. Similar to CT 10/21/2024 but now slightly increased in the right lower lobe apex. Cased discussed with ID by ED provider and antibiotic changed to atovaquone.   -Stopped PTA bactrim  -Cont atovaquone 750 mg twice daily   -ID consulted, appreciate recs    #Acute lymphoblastic leukemia s/p VERÓNICA allo  #Chronic GVHD  Reviewed recent Oncology note from 11/22/2024. 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 History of oral GVHD but patient states dose not currently need his s/s dexamethasone  -Cont Refresh eye drops. Not currently using fluorometholone eye drops  -Cont prednisone 15 mg daily   -BMT following  -Cont penicillin V and acyclovir for prophylaxis     #Rhinovirus infection  Reports recent congestion and cough. Tested positive to rhinovirus 11/18 and 11/21 on viral panel  -Supportive care    #Anemia of chronic disease  Stable  -Monitor    #History of Grave's disease  -Cont synthroid  -Scheduled for virtual Endo appointment today.     #Hyperlipidemia  -Cont statin      Diet:  Regular   DVT Prophylaxis: Enoxaparin (Lovenox) SQ  Ga Catheter: Not present  Lines: PRESENT             Cardiac Monitoring: None  Code Status:  Full Code. Discussed with patient. Wife present.           Diet: Combination Diet Regular Diet Adult    DVT Prophylaxis: Enoxaparin (Lovenox) SQ  Ga Catheter: Not present  Lines: PRESENT      Port A Cath Single Right Chest wall-Site Assessment: WDL      Cardiac Monitoring: ACTIVE order. Indication: Electrolyte Imbalance (24 hours)- Magnesium <1.3 mg/ml; Potassium < =2.8 or > 5.5 mg/ml  Code Status: Full Code      Clinically Significant Risk Factors        # Hyperkalemia: Highest K = 6.2 mmol/L in last 2 days, will monitor as appropriate  #  "Hyponatremia: Lowest Na = 122 mmol/L in last 2 days, will monitor as appropriate  # Hypochloremia: Lowest Cl = 90 mmol/L in last 2 days, will monitor as appropriate   # Hypercalcemia: Highest Ca = 11.4 mg/dL in last 2 days, will monitor as appropriate        # Hypertension: Noted on problem list            # Obesity: Estimated body mass index is 34.96 kg/m  as calculated from the following:    Height as of this encounter: 1.83 m (6' 0.05\").    Weight as of this encounter: 117.1 kg (258 lb 1.6 oz)., PRESENT ON ADMISSION            Social [employer]    Tobacco Use: Medium Risk (11/21/2024)    Patient History     Smoking Tobacco Use: Former     Smokeless Tobacco Use: Never     Passive Exposure: Past          Disposition Plan     Medically Ready for Discharge: Anticipated in 2-4 Days             Jayro Koehler MD  Hospitalist Service, GOLD TEAM 8  [facility]  Securely message with [employer] (more info)  Text page via [employer] Paging/Directory   See signed in provider for up to date coverage information  ______________________________________________________________________    Interval History   NEGRA and lytes stabilizing overnight, patient feeling well.    Physical Exam   Vital Signs: Temp: 97.5  F (36.4  C) Temp src: Oral BP: 117/75 Pulse: 95   Resp: 16 SpO2: 95 % O2 Device: None (Room air)    Weight: 258 lbs 1.6 oz    Gen: no acute distress, alert  CV: RRR, no murmurs  Pulm: no increased WOB  Abd: soft, nl BS  Extremities: 1+ LE edema    Medical Decision Making       60 MINUTES SPENT BY ME on the date of service doing chart review, history, exam, documentation & further activities per the note.      Data     "

## 2024-11-27 NOTE — PROGRESS NOTES
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 Bactrim subsequently discontinued and switched to Atovaquone. Most likely explanation for constellation of abnormalities is hyperkalemia and NEGRA secondary to Bactrim, with hyponatremia in the setting of NEGRA. Unlikely for bactrim to be causing this extent of hyponatremia and anticipate Na improving with improvement of NEGRA.   - we held amlodipine in the setting of soft bp   - Repeat BMP (done for you)  - Added TSH with reflex T4, cortisol level  - Close UOP monitoring  - Phos and Mg repletion PRN per primary  -we decreased acyclovir from 800 BID to 400 BID   -Agree with atovaquone instead of bactrim for now   -please give 500 ml NS as cr and Na worse today   -repeat Urine Na and urine lytes         Skyler Baugh MD  Division of Renal Disease and Hypertension  [facility] [place]  [employer]        REASON FOR CONSULT: NEGRA    HISTORY OF PRESENT ILLNESS:  Cr thalia to 2.3  Na dropped to 127     PAST MEDICAL HISTORY:  Reviewed with patient on 11/27/2024     Past Medical History:   Diagnosis Date    ALL (acute lymphocytic leukemia) (H)     CKD (chronic kidney disease)     GVHD (graft versus host disease) (H)     H/O peripheral stem cell transplant (H)     Hyperthyroidism 1996    Infection due to 2019 novel coronavirus 01/16/2023    Influenza A 11/2022    Postablative hypothyroidism 1997    Pulmonary embolism (H)     Renal cell carcinoma (H) 2007    right kidney    Sleep apnea        Past Surgical History:    Procedure Laterality Date    APPENDECTOMY      BACK SURGERY  2017    BONE MARROW BIOPSY, BONE SPECIMEN, NEEDLE/TROCAR Left 09/02/2021    Procedure: BIOPSY, BONE MARROW;  Surgeon: Jailyn Ny APRN CNP;  Location: [facility] OR    BONE MARROW BIOPSY, BONE SPECIMEN, NEEDLE/TROCAR Left 11/15/2021    Procedure: BIOPSY, BONE MARROW;  Surgeon: Socrates De La Torre;  Location: [facility] OR    BONE MARROW BIOPSY, BONE SPECIMEN, NEEDLE/TROCAR Left 02/07/2022    Procedure: BIOPSY, BONE MARROW;  Surgeon: Renetta Wiggins PA-C;  Location: [facility] OR    BONE MARROW BIOPSY, BONE SPECIMEN, NEEDLE/TROCAR Left 08/18/2022    Procedure: BIOPSY, BONE MARROW;  Surgeon: Lorrie Yap PA-C;  Location: [facility] OR    BONE MARROW BIOPSY, BONE SPECIMEN, NEEDLE/TROCAR Left 08/07/2023    Procedure: Bone marrow biopsy, bone specimen, needle/trocar;  Surgeon: Teressa Rick PA-C;  [place] [facility] [place]    BRONCHOSCOPY (RIGID OR FLEXIBLE), DIAGNOSTIC N/A 04/29/2022    Procedure: BRONCHOSCOPY, WITH BRONCHOALVEOLAR LAVAGE;  Surgeon: Murtaza Garcia MD;  Location: [facility]    BRONCHOSCOPY (RIGID OR FLEXIBLE), DIAGNOSTIC N/A 11/1/2024    Procedure: BRONCHOSCOPY, WITH BRONCHOALVEOLAR LAVAGE;  Surgeon: Murtaza Garcia MD;  Location: [facility]    IR CVC TUNNEL PLACEMENT > 5 YRS OF AGE  08/04/2021    IR CVC TUNNEL REMOVAL LEFT  09/02/2021    IR LIVER BIOPSY PERCUTANEOUS  02/21/2022    NEPHRECTOMY  2007    ORTHOPEDIC SURGERY      bilateral shoulder surgeries    PERCUTANEOUS BIOPSY LIVER N/A 02/21/2022    Procedure: NEEDLE BIOPSY, LIVER, PERCUTANEOUS;  Surgeon: Trace Castellanos MD;  Location: [facility] OR    PHACOEMULSIFICATION WITH STANDARD INTRAOCULAR LENS IMPLANT Left 1/15/2024    Procedure: LEFT EYE PHACOEMULSIFICATION, CATARACT, WITH STANDARD INTRAOCULAR LENS IMPLANT INSERTION;  Surgeon: Deshawn Menezes MD;  Location: [facility] OR    PHACOEMULSIFICATION WITH STANDARD INTRAOCULAR LENS IMPLANT Right 2/5/2024    Procedure: RIGHT EYE PHACOEMULSIFICATION,  CATARACT, WITH STANDARD INTRAOCULAR LENS IMPLANT INSERTION;  Surgeon: Deshawn Menezes MD;  [place] [facility] [place]        MEDICATIONS:  PTA Meds  Prior to Admission medications    Medication Sig Last Dose Taking? Auth Provider Long Term End Date   acyclovir (ZOVIRAX) 400 MG tablet Take 1 tablet (400 mg) by mouth every 12 hours.  Yes Jayro Koehler MD Yes    atovaquone (MEPRON) 750 MG/5ML suspension Take 5 mLs (750 mg) by mouth 2 times daily for 13 days.  Yes Jayro Koehler MD No 12/10/24   Carboxymethylcell-Glycerin PF (REFRESH RELIEVA PF) 0.5-1 % SOLN Apply 1 drop to eye 4 times daily Preservative free. Either PF multidose bottle or PF vials   Aisha Juarez, ADILENE     dexAMETHasone alcohol-free (DECADRON) 0.1 MG/ML solution Swish and spit 10 mLs (1 mg) in mouth 2 times daily.   Chris Cote MD Yes    escitalopram (LEXAPRO) 10 MG tablet Take 1 tablet (10 mg) by mouth daily   Akshat Bearden MD Yes    fluorometholone (FML LIQUIFILM) 0.1 % ophthalmic suspension Place 1 drop Into the left eye 3 times daily.   Aisha Juarez, OD No    levothyroxine (SYNTHROID/LEVOTHROID) 112 MCG tablet Take 1 tablet (112 mcg) by mouth daily   Natalia Gray MD Yes    LORazepam (ATIVAN) 1 MG tablet Take 1 mg by mouth every 6 hours as needed for anxiety. PRN   Reported, Patient No    metroNIDAZOLE (METROCREAM) 0.75 % external cream Apply topically 2 times daily Apply to the nose.   Zoe Wesley PA-C     nicotine polacrilex (NICORETTE) 4 MG gum Place 4 mg inside cheek daily.   Reported, Patient     Nutritional Supplements (ENSURE COMPLETE SHAKE) LIQD Take 11 mLs by mouth every morning   Reported, Patient     omeprazole (PRILOSEC) 40 MG DR capsule Take 1 capsule (40 mg) by mouth daily   Akshat Bearden MD     penicillin V (VEETID) 250 MG tablet Take 1 Tablet (250 mg) by mouth two times daily.   Chris Cote MD     predniSONE (DELTASONE) 10 MG tablet Take 1 tablet (10 mg) by mouth daily   Kera  Chris PERALES MD No    predniSONE (DELTASONE) 5 MG tablet Take 1 tablet (5 mg) by mouth daily. Currently taking 15 mg daily ( on taper)   Chris Cote MD No    rosuvastatin (CRESTOR) 5 MG tablet Take 1 tablet (5 mg) by mouth daily.   Chris Cote MD Yes    sennosides (SENOKOT) 8.6 MG tablet Take 1 tablet by mouth 3 times daily as needed for constipation   Layla Spencer PA-C     sodium chloride 0.9 % neb solution 3 mLs by Other route 2 times daily   Aisha Juarez, ADILENE     tacrolimus (PROTOPIC) 0.1 % external ointment Apply topically 2 times daily   Akshat Bearden MD        Current Meds  Current Facility-Administered Medications   Medication Dose Route Frequency Provider Last Rate Last Admin    acyclovir (ZOVIRAX) tablet 400 mg  400 mg Oral Q12H Skyler Baugh MD   400 mg at 11/27/24 0749    [Held by provider] amLODIPine (NORVASC) tablet 5 mg  5 mg Oral At Bedtime Leeann Dudley PA-C   5 mg at 11/25/24 2231    atovaquone (MEPRON) suspension 750 mg  750 mg Oral BID Leeann Dudley PA-C   750 mg at 11/27/24 0949    carboxymethylcellulose PF (REFRESH PLUS) 0.5 % ophthalmic solution 1 drop  1 drop Ophthalmic 4x Daily Leeann Dudley PA-C   1 drop at 11/27/24 1152    enoxaparin ANTICOAGULANT (LOVENOX) injection 40 mg  40 mg Subcutaneous Q24H Leeann Dudley PA-C   40 mg at 11/26/24 2121    escitalopram (LEXAPRO) tablet 10 mg  10 mg Oral At Bedtime Leeann Dudley PA-C   10 mg at 11/26/24 2119    heparin lock flush 10 unit/mL injection 5-10 mL  5-10 mL Intracatheter Q24H Jayro Koehler MD        heparin lock flush 100 unit/mL injection 5-10 mL  5-10 mL Intracatheter Q28 Days Jayro Koehler MD        levothyroxine (SYNTHROID/LEVOTHROID) tablet 112 mcg  112 mcg Oral Daily Leeann Dudley PA-C   112 mcg at 11/27/24 0743    pantoprazole (PROTONIX) EC tablet 40 mg  40 mg Oral BID Kobi Hernadez MD   40 mg at 11/27/24 0743    penicillin V (VEETID) tablet 250 mg  250 mg Oral BID Octavia  MIREYA Bradshaw   250 mg at 24 0743    predniSONE (DELTASONE) tablet 15 mg  15 mg Oral Daily Octavia MIREYA Bradshaw   15 mg at 24 0743    rosuvastatin (CRESTOR) tablet 5 mg  5 mg Oral At Bedtime OctaviaLeeann PA-C   5 mg at 24 2119    sennosides (SENOKOT) tablet 1 tablet  1 tablet Oral At Bedtime Leeann Dudley PA-C   1 tablet at 24 2120    sodium chloride (PF) 0.9% PF flush 10-20 mL  10-20 mL Intracatheter Q28 Days Jayro Koehler MD        sodium chloride (PF) 0.9% PF flush 3 mL  3 mL Intracatheter Q8H Leeann Dudley PA-C   3 mL at 24 1227     Infusion Meds  Current Facility-Administered Medications   Medication Dose Route Frequency Provider Last Rate Last Admin       ALLERGIES:    No Active Allergies      REVIEW OF SYSTEMS:  A comprehensive of systems was negative except as noted above.    SOCIAL HISTORY:   Social History     Socioeconomic History    Marital status:      Spouse name: Not on file    Number of children: Not on file    Years of education: Not on file    Highest education level: Not on file   Occupational History    Not on file   Tobacco Use    Smoking status: Former     Current packs/day: 0.00     Average packs/day: 2.0 packs/day for 30.0 years (60.0 ttl pk-yrs)     Types: Cigarettes     Start date: 1989     Quit date: 2019     Years since quittin.5     Passive exposure: Past    Smokeless tobacco: Never    Tobacco comments:     DAILY USE OF NICORETTE GUM   Vaping Use    Vaping status: Never Used   Substance and Sexual Activity    Alcohol use: Not Currently    Drug use: Never    Sexual activity: Not on file   Other Topics Concern    Parent/sibling w/ CABG, MI or angioplasty before 65F 55M? Not Asked   Social History Narrative    Not on file     Social Drivers of Health     Financial Resource Strain: Low Risk  (2024)    Financial Resource Strain     Within the past 12 months, have you or your family members you live with been unable to get  utilities (heat, electricity) when it was really needed?: No   Food Insecurity: Low Risk  (11/27/2024)    Food Insecurity     Within the past 12 months, did you worry that your food would run out before you got money to buy more?: No     Within the past 12 months, did the food you bought just not last and you didn t have money to get more?: No   Transportation Needs: Low Risk  (11/27/2024)    Transportation Needs     Within the past 12 months, has lack of transportation kept you from medical appointments, getting your medicines, non-medical meetings or appointments, work, or from getting things that you need?: No   Physical Activity: Not on file   Stress: Not on file   Social Connections: Socially Integrated (5/30/2024)    Received from [facility] [facility]    Social Connections     Do you often feel lonely or isolated from those around you?: 0   Interpersonal Safety: Low Risk  (11/25/2024)    Interpersonal Safety     Do you feel physically and emotionally safe where you currently live?: Yes     Within the past 12 months, have you been hit, slapped, kicked or otherwise physically hurt by someone?: No     Within the past 12 months, have you been humiliated or emotionally abused in other ways by your partner or ex-partner?: No   Housing Stability: Low Risk  (11/27/2024)    Housing Stability     Do you have housing? : Yes     Are you worried about losing your housing?: No   Recent Concern: Housing Stability - High Risk (11/27/2024)    Housing Stability     Do you have housing? : No     Are you worried about losing your housing?: No     Reviewed with patient   Spouse and sister accompany Nik HARRY Nava in hospital room    FAMILY MEDICAL HISTORY:   Family History   Problem Relation Age of Onset    Lung Cancer Mother     Depression Mother     Polycythemia Father     Anesthesia Reaction Father         slow to awaken    Coronary Artery Disease Maternal Grandmother     Diabetes Maternal Grandmother   "   Hypertension Maternal Grandmother     Hypothyroidism Sister     Glaucoma No family hx of     Macular Degeneration No family hx of     Venous thrombosis No family hx of     Melanoma No family hx of     Skin Cancer No family hx of        PHYSICAL EXAM:   Temp  Av.2  F (36.2  C)  Min: 96.8  F (36  C)  Max: 97.7  F (36.5  C)      Pulse  Av.9  Min: 67  Max: 98 Resp  Av.7  Min: 16  Max: 20  SpO2  Av %  Min: 96 %  Max: 98 %       /71 (BP Location: Left arm)   Pulse 87   Temp 97.9  F (36.6  C) (Oral)   Resp 16   Ht 1.83 m (6' 0.05\")   Wt 117.1 kg (258 lb 1.6 oz)   SpO2 95%   BMI 34.96 kg/m     Date 24 0700 - 24 0659   Shift 7340-7018 5256-4529 7436-1337 24 Hour Total   INTAKE   P.O. 480   480   Shift Total(mL/kg) 480(4.36)   480(4.36)   OUTPUT   Urine 200   200   Shift Total(mL/kg) 200(1.82)   200(1.82)   Weight (kg) 110.18 110.18 110.18 110.18      Admit Weight: 118.2 kg (260 lb 9.6 oz)     GENERAL APPEARANCE: sitting up in chair, no acute distress, awake, alert  Pulmonary: no increased work of breathing on room air  CV: regular rhythm per cardiac monitor   - 1+ pitting edema to mid shin b/l   - bilateral pulses symmetric  GI: soft, nontender  SKIN: warm, dry, no cyanosis  NEURO: face symmetric  LABS:   CMP  Recent Labs   Lab 24  1234 24  0325 24  2151 24  1539 24  1100 24  0330 24  1911 24  1744 24  1432 24  1451 24  1555   * 129*  --  122* 124* 124*  --  125* 127* 131* 132*   POTASSIUM 5.4* 4.6 5.3 5.7* 5.3  5.3 5.0   < > 5.9* 6.2* 5.5* 4.7   CHLORIDE 94* 95*  --  90* 92* 92*  --  93* 91* 100 100   CO2 20* 22  --  20* 20* 20*  --  18* 20* 20* 19*   ANIONGAP 13 12  --  12 12 12  --  14 16* 11 13   * 103*  --  124* 106* 112*   < > 108* 130* 101* 169*   BUN 39.8* 39.3*  --  41.7* 41.3* 38.6*  --  40.9* 37.0* 40.0* 38.8*   CR 2.31* 1.95*  --  2.15* 2.08* 1.83*  --  1.83* 1.87* 1.82* 1.81* "   GFRESTIMATED 30* 37*  --  33* 34* 40*  --  40* 39* 40* 41*   DAMON 10.4 9.8  --  10.1 10.3 10.5*  --  10.9* 11.4* 10.0 9.9   MAG  --  2.2  --   --   --  2.2  --  2.2  --   --   --    PHOS  --  4.3  --   --   --  4.2  --   --   --   --   --    PROTTOTAL  --   --   --   --   --   --   --  7.3 7.6 6.9 6.7   ALBUMIN  --   --   --   --   --   --   --  4.4 4.6 4.1 3.9   BILITOTAL  --   --   --   --   --   --   --  0.2 0.2 0.2 0.2   ALKPHOS  --   --   --   --   --   --   --  102 106 114 107   AST  --   --   --   --   --   --   --  40 42 43 41   ALT  --   --   --   --   --   --   --  40 41 39 37    < > = values in this interval not displayed.     CBC  Recent Labs   Lab 11/26/24  0330 11/25/24  1744 11/25/24  1432 11/22/24  1451   HGB 12.1* 12.3* 12.6* 11.6*   WBC 7.0 7.1 9.0 11.4*   RBC 3.74* 3.74* 3.85* 3.53*   HCT 35.0* 35.5* 36.3* 32.5*   MCV 94 95 94 92   MCH 32.4 32.9 32.7 32.9   MCHC 34.6 34.6 34.7 35.7   RDW 15.9* 16.2* 16.4* 16.6*    230 224 231     INRNo lab results found in last 7 days.  ABGNo lab results found in last 7 days.   URINE STUDIES  Recent Labs   Lab Test 11/26/24  1157 10/25/24  1402 07/09/24  1250 02/20/24  1507   COLOR Yellow Yellow Dark Yellow* Yellow   APPEARANCE Slightly Cloudy* Clear Clear Clear   URINEGLC Negative Negative Negative Negative   URINEBILI Negative Negative Negative Negative   URINEKETONE Negative Negative Trace* Negative   SG 1.021 1.024 1.031 1.014   UBLD Negative Negative Negative Negative   URINEPH 6.0 6.0 6.0 6.5   PROTEIN 30* 20* 70* Negative   NITRITE Negative Negative Negative Negative   LEUKEST Negative Negative Negative Negative   RBCU 1 1 1 <1   WBCU 0 <1 1 <1     No lab results found.  PTH  Recent Labs   Lab Test 10/25/24  1303 12/21/23  1136 11/27/23  0753 10/18/22  1412   PTHI 36 58 54 60     IRON STUDIES  Recent Labs   Lab Test 04/02/24  1345 11/14/23  1234 10/10/23  1143 08/15/23  1316 06/13/23  1424 03/28/23  1548 11/08/22  1446 10/18/22  1412 08/18/22  1030  07/08/21  1055   IRON 128 149 48* 38* 66 28* 114  --   --   --      --  283 282 283 279 351  --   --   --    IRONSAT 44  --  17 13* 23 10* 32  --   --   --    CLARE 1,597* 2,686* 1,997* 1,610* 1,474* 1,381* 2,813* 2,348* 1,023* 762*       IMAGING:  All imaging studies reviewed by me.     Skyler Baugh MD

## 2024-11-27 NOTE — PLAN OF CARE
Problem: Stem Cell/Bone Marrow Transplant  Goal: Blood Counts Within Acceptable Range  Outcome: Not Progressing   Goal Outcome Evaluation:  Vss. No pain. No nausea. Intermittent cough and has rhino virus. On tele and in nsr. Ate ok. Mg 2.2, Phos 4.3, Na 127, K+5.4 and creatinine 2.31. 500 ml bolus infusing now. Received lokelma for K+ level. Hard stool this am and stool sent for c-diff. C-diff negative. Received senna. Showered. Independent. Plan bmp at 6 pm.

## 2024-11-27 NOTE — PLAN OF CARE
Goal Outcome Evaluation:      Plan of Care Reviewed With: patient          Outcome Evaluation: discharge plan: home with family

## 2024-11-28 VITALS
BODY MASS INDEX: 35.21 KG/M2 | HEIGHT: 72 IN | WEIGHT: 260 LBS | HEART RATE: 89 BPM | OXYGEN SATURATION: 97 % | DIASTOLIC BLOOD PRESSURE: 79 MMHG | SYSTOLIC BLOOD PRESSURE: 134 MMHG | TEMPERATURE: 97.4 F | RESPIRATION RATE: 16 BRPM

## 2024-11-28 LAB
ANION GAP SERPL CALCULATED.3IONS-SCNC: 12 MMOL/L (ref 7–15)
BACTERIA BRONCH: NORMAL
BUN SERPL-MCNC: 40.5 MG/DL (ref 8–23)
CALCIUM SERPL-MCNC: 9.7 MG/DL (ref 8.8–10.4)
CHLORIDE SERPL-SCNC: 99 MMOL/L (ref 98–107)
CREAT SERPL-MCNC: 1.71 MG/DL (ref 0.67–1.17)
EGFRCR SERPLBLD CKD-EPI 2021: 44 ML/MIN/1.73M2
GLUCOSE SERPL-MCNC: 99 MG/DL (ref 70–99)
HCO3 SERPL-SCNC: 21 MMOL/L (ref 22–29)
HOLD SPECIMEN: NORMAL
MAGNESIUM SERPL-MCNC: 2 MG/DL (ref 1.7–2.3)
PHOSPHATE SERPL-MCNC: 3.5 MG/DL (ref 2.5–4.5)
PLATELET # BLD AUTO: 188 10E3/UL (ref 150–450)
POTASSIUM SERPL-SCNC: 4.6 MMOL/L (ref 3.4–5.3)
SODIUM SERPL-SCNC: 132 MMOL/L (ref 135–145)

## 2024-11-28 PROCEDURE — 250N000013 HC RX MED GY IP 250 OP 250 PS 637: Performed by: PHYSICIAN ASSISTANT

## 2024-11-28 PROCEDURE — 250N000013 HC RX MED GY IP 250 OP 250 PS 637: Performed by: STUDENT IN AN ORGANIZED HEALTH CARE EDUCATION/TRAINING PROGRAM

## 2024-11-28 PROCEDURE — 90620 MENB-4C VACCINE IM: CPT | Performed by: STUDENT IN AN ORGANIZED HEALTH CARE EDUCATION/TRAINING PROGRAM

## 2024-11-28 PROCEDURE — 84132 ASSAY OF SERUM POTASSIUM: CPT | Performed by: STUDENT IN AN ORGANIZED HEALTH CARE EDUCATION/TRAINING PROGRAM

## 2024-11-28 PROCEDURE — 250N000011 HC RX IP 250 OP 636: Performed by: STUDENT IN AN ORGANIZED HEALTH CARE EDUCATION/TRAINING PROGRAM

## 2024-11-28 PROCEDURE — 99239 HOSP IP/OBS DSCHRG MGMT >30: CPT | Performed by: STUDENT IN AN ORGANIZED HEALTH CARE EDUCATION/TRAINING PROGRAM

## 2024-11-28 PROCEDURE — 84100 ASSAY OF PHOSPHORUS: CPT | Performed by: STUDENT IN AN ORGANIZED HEALTH CARE EDUCATION/TRAINING PROGRAM

## 2024-11-28 PROCEDURE — 90471 IMMUNIZATION ADMIN: CPT | Performed by: STUDENT IN AN ORGANIZED HEALTH CARE EDUCATION/TRAINING PROGRAM

## 2024-11-28 PROCEDURE — 250N000021 HC RX MED A9270 GY (STAT IND- M) 250: Performed by: STUDENT IN AN ORGANIZED HEALTH CARE EDUCATION/TRAINING PROGRAM

## 2024-11-28 PROCEDURE — 250N000012 HC RX MED GY IP 250 OP 636 PS 637: Performed by: PHYSICIAN ASSISTANT

## 2024-11-28 PROCEDURE — 85049 AUTOMATED PLATELET COUNT: CPT | Performed by: PHYSICIAN ASSISTANT

## 2024-11-28 PROCEDURE — 83735 ASSAY OF MAGNESIUM: CPT | Performed by: STUDENT IN AN ORGANIZED HEALTH CARE EDUCATION/TRAINING PROGRAM

## 2024-11-28 PROCEDURE — 90472 IMMUNIZATION ADMIN EACH ADD: CPT | Performed by: STUDENT IN AN ORGANIZED HEALTH CARE EDUCATION/TRAINING PROGRAM

## 2024-11-28 PROCEDURE — 99233 SBSQ HOSP IP/OBS HIGH 50: CPT | Mod: GC | Performed by: INTERNAL MEDICINE

## 2024-11-28 PROCEDURE — 90734 MENACWYD/MENACWYCRM VACC IM: CPT | Performed by: STUDENT IN AN ORGANIZED HEALTH CARE EDUCATION/TRAINING PROGRAM

## 2024-11-28 PROCEDURE — 80048 BASIC METABOLIC PNL TOTAL CA: CPT | Performed by: STUDENT IN AN ORGANIZED HEALTH CARE EDUCATION/TRAINING PROGRAM

## 2024-11-28 RX ADMIN — PANTOPRAZOLE SODIUM 40 MG: 40 TABLET, DELAYED RELEASE ORAL at 08:01

## 2024-11-28 RX ADMIN — SENNOSIDES AND DOCUSATE SODIUM 1 TABLET: 50; 8.6 TABLET ORAL at 11:40

## 2024-11-28 RX ADMIN — ACYCLOVIR 400 MG: 400 TABLET ORAL at 08:01

## 2024-11-28 RX ADMIN — ATOVAQUONE 750 MG: 750 SUSPENSION ORAL at 08:01

## 2024-11-28 RX ADMIN — NEISSERIA MENINGITIDIS SEROGROUP B NHBA FUSION PROTEIN ANTIGEN, NEISSERIA MENINGITIDIS SEROGROUP B FHBP FUSION PROTEIN ANTIGEN AND NEISSERIA MENINGITIDIS SEROGROUP B NADA PROTEIN ANTIGEN 0.5 ML: 50; 50; 50; 25 INJECTION, SUSPENSION INTRAMUSCULAR at 13:55

## 2024-11-28 RX ADMIN — Medication 1 DROP: at 08:01

## 2024-11-28 RX ADMIN — PENICILLIN V POTASSIUM 250 MG: 250 TABLET, FILM COATED ORAL at 08:01

## 2024-11-28 RX ADMIN — Medication 5 ML: at 13:41

## 2024-11-28 RX ADMIN — PREDNISONE 15 MG: 10 TABLET ORAL at 08:01

## 2024-11-28 RX ADMIN — LEVOTHYROXINE SODIUM 112 MCG: 0.11 TABLET ORAL at 08:01

## 2024-11-28 RX ADMIN — Medication 1 DROP: at 11:32

## 2024-11-28 RX ADMIN — MENINGOCOCCAL (GROUPS A, C, Y AND W-135) OLIGOSACCHARIDE DIPHTHERIA CRM197 CONJUGATE VACCINE 0.5 ML: KIT at 14:10

## 2024-11-28 ASSESSMENT — ACTIVITIES OF DAILY LIVING (ADL)
ADLS_ACUITY_SCORE: 36
ADLS_ACUITY_SCORE: 37
ADLS_ACUITY_SCORE: 36
ADLS_ACUITY_SCORE: 37
ADLS_ACUITY_SCORE: 36
ADLS_ACUITY_SCORE: 37
ADLS_ACUITY_SCORE: 36
ADLS_ACUITY_SCORE: 37
ADLS_ACUITY_SCORE: 36

## 2024-11-28 NOTE — PROGRESS NOTES
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     Interval changes:  - NEGRA initially improving this morning, but midday labs with Cr 2.3, hyperK 5.4, hypoNa worsening at 127. Discussed with nephro. Giving 500 ml NS and repeating urine studies. Giving lokelma for hyperK.  - q4h labs for now  - meningitis vaccines per ID prior to discharge  - cont atovaquone and decreased dosing acyclovir    #Acute on chronic kidney disease with Hyperkalemia 2/2 bactrim use  #Hyponatremia  Nik was diagnosed with PJP pneumonia after BAL on 11/1/2024. He was started on Bactrim 11/18 with plan for 21 day course. He had labs 11/22 which revealed elevated Cr from baseline (1.82), hyponatremia (131) and mild hyperkalemia (5.5). He had repeat outpatient labs today with results trending up with Cr 1.87 and potassium level 6.2 with trending down sodium level at 127. and therefore was sent to ED. No EKG changes. Patient reports general malaise/feeling unwell with worsening leg movements/jittery and anorexia. He had vomiting x 2 today. In ED, he received calcium gluconate 1 g IV, Lasix 20 mg IV, regular insulin 10 units IV, Dextrose 25 g, and Lokelma 10g with improvement in potassium to 5.9.   - BID BMP  - cont PRN lokelma for mild hyperK  - nephrology consult recs pending    #PJP Pneumonia  Patient reports intermittent fever, chills and dypsnea on exertion [id number]. Peak temp at home 101.8F. Evaluated outpatient and started 7 day course of doxycycline. Extensive work up revealed PJP positive BAL 1/1/2024. Started on Bactrim 11/18 with  plan for 21 day course. Most recent CT Chest with contrast on 11/21/2024 with scattered groundglass opacities within the bilateral lung fields suggestive of infection. Similar to CT 10/21/2024 but now slightly increased in the right lower lobe apex. Cased discussed with ID by ED provider and antibiotic changed to atovaquone.   -Stopped PTA bactrim  -Cont atovaquone 750 mg twice daily   -ID consulted, appreciate recs    #Acute lymphoblastic leukemia s/p VERÓNICA allo  #Chronic GVHD  Reviewed recent Oncology note from 11/22/2024. 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 History of oral GVHD but patient states dose not currently need his s/s dexamethasone  -Cont Refresh eye drops. Not currently using fluorometholone eye drops  -Cont prednisone 15 mg daily   -BMT following  -Cont penicillin V and acyclovir for prophylaxis     #Rhinovirus infection  Reports recent congestion and cough. Tested positive to rhinovirus 11/18 and 11/21 on viral panel  -Supportive care    #Anemia of chronic disease  Stable  -Monitor    #History of Grave's disease  -Cont synthroid  -Scheduled for virtual Endo appointment today.     #Hyperlipidemia  -Cont statin      Diet:  Regular   DVT Prophylaxis: Enoxaparin (Lovenox) SQ  Ag Catheter: Not present  Lines: PRESENT             Cardiac Monitoring: None  Code Status:  Full Code. Discussed with patient. Wife present.           Diet: Combination Diet Regular Diet Adult    DVT Prophylaxis: Enoxaparin (Lovenox) SQ  Ga Catheter: Not present  Lines: PRESENT    { TIP  Link to Avatar to review     :55506}  Port A Cath Single Right Chest wall-Site Assessment: WDL      Cardiac Monitoring: ACTIVE order. Indication: Electrolyte Imbalance (24 hours)- Magnesium <1.3 mg/ml; Potassium < =2.8 or > 5.5 mg/ml  Code Status: Full Code      Clinically Significant Risk Factors   { TIP  Diagnoses will continue to appear throughout  "the admission.  :38857}     # Hyperkalemia: Highest K = 5.7 mmol/L in last 2 days, will monitor as appropriate  # Hyponatremia: Lowest Na = 122 mmol/L in last 2 days, will monitor as appropriate  # Hypochloremia: Lowest Cl = 90 mmol/L in last 2 days, will monitor as appropriate          # Hypertension: Noted on problem list            # Obesity: Estimated body mass index is 35.22 kg/m  as calculated from the following:    Height as of this encounter: 1.83 m (6' 0.05\").    Weight as of this encounter: 117.9 kg (260 lb)., PRESENT ON ADMISSION     # Financial/Environmental Concerns: none         Social Drivers of Health    Housing Stability: Low Risk  (11/27/2024)    Housing Stability     Do you have housing? : Yes     Are you worried about losing your housing?: No   Recent Concern: Housing Stability - High Risk (11/27/2024)    Housing Stability     Do you have housing? : No     Are you worried about losing your housing?: No   Tobacco Use: Medium Risk (11/21/2024)    Patient History     Smoking Tobacco Use: Former     Smokeless Tobacco Use: Never     Passive Exposure: Past          Disposition Plan   {TIP  The patient's Medical Readiness for Discharge [MRD] has been documented today or is 'Ready Now'. Last Documentation- Anticipated in 2-4 Days   Use SmartList below if a change is needed.  :60781}  Medically Ready for Discharge: {TIME; MEDICALLY READY FOR DISCHARGE:68830260}             Jayro Koehler MD  Hospitalist Service, [facility]  [facility]  Securely message with [employer] (more info)  Text page via [employer] Paging/Directory   See signed in provider for up to date coverage information  ______________________________________________________________________    Interval History   {Pertinent overnight events  ;***}    Physical Exam   Vital Signs: Temp: 98.2  F (36.8  C) Temp src: Oral BP: 104/72 Pulse: 97   Resp: 16 SpO2: 97 % O2 Device: None (Room air)    Weight: 260 lbs " "0 oz    {Recommend personal SmartPhrase or Notewriter for exam (OPTIONAL)   :826760}    Medical Decision Making   { TIP   MDM Calculator    MDM grid (w/ times)    Coding Support Chat  Billing is now based on time OR medical decision making complexity. Medical decision making included in your A&P does NOT need to be re-documented here.    :57981}    {Time  :366945::\"*** MINUTES SPENT BY ME on the date of service doing chart review, history, exam, documentation & further activities per the note.\"}      Data   {INSERT LABS/IMAGING (OPTIONAL)   :672248}  "

## 2024-11-28 NOTE — PROGRESS NOTES
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 Bactrim subsequently discontinued and switched to Atovaquone. Most likely explanation for constellation of abnormalities is hyperkalemia and NEGRA secondary to Bactrim, with hyponatremia in the setting of NEGRA. Unlikely for bactrim to be causing this extent of hyponatremia and anticipate Na improving with improvement of NEGRA.   - we held amlodipine in the setting of soft bp   - Repeat BMP (done for you)  - Added TSH with reflex T4, cortisol level  - Close UOP monitoring  - Phos and Mg repletion PRN per primary  -we decreased acyclovir from 800 BID to 400 BID   -Agree with atovaquone instead of bactrim for now   -cr and Na both have improved. He will get labs on Friday and and see his PCP in 12/11 with labs as well to follow-up on his cr  -he will see his nephrologist dr jiménez in 1/2025  -we are ok with discharging the patient today 11/29          Skyler Baugh MD  Division of Renal Disease and Hypertension  [facility]  [facility]  Vocera Web Console        REASON FOR CONSULT: NEGRA    HISTORY OF PRESENT ILLNESS:  Cr 1.7  Na 132    PAST MEDICAL HISTORY:  Reviewed with patient on 11/28/2024     Past Medical History:   Diagnosis Date    ALL (acute lymphocytic leukemia) (H)     CKD (chronic kidney disease)     GVHD (graft versus host disease) (H)     H/O peripheral stem cell transplant (H)     Hyperthyroidism 1996    Infection due to 2019 novel coronavirus 01/16/2023    Influenza A 11/2022    Postablative hypothyroidism  1997    Pulmonary embolism (H)     Renal cell carcinoma (H) 2007    right kidney    Sleep apnea        Past Surgical History:   Procedure Laterality Date    APPENDECTOMY      BACK SURGERY  2017    BONE MARROW BIOPSY, BONE SPECIMEN, NEEDLE/TROCAR Left 09/02/2021    Procedure: BIOPSY, BONE MARROW;  Surgeon: Jailyn Ny APRN CNP;  Location: [facility] OR    BONE MARROW BIOPSY, BONE SPECIMEN, NEEDLE/TROCAR Left 11/15/2021    Procedure: BIOPSY, BONE MARROW;  Surgeon: Socrates De La Torre;  Location: [facility] OR    BONE MARROW BIOPSY, BONE SPECIMEN, NEEDLE/TROCAR Left 02/07/2022    Procedure: BIOPSY, BONE MARROW;  Surgeon: Renetta Wiggins PA-C;  Location: [facility] OR    BONE MARROW BIOPSY, BONE SPECIMEN, NEEDLE/TROCAR Left 08/18/2022    Procedure: BIOPSY, BONE MARROW;  Surgeon: Lorrie Yap PA-C;  Location: [facility] OR    BONE MARROW BIOPSY, BONE SPECIMEN, NEEDLE/TROCAR Left 08/07/2023    Procedure: Bone marrow biopsy, bone specimen, needle/trocar;  Surgeon: Teressa Rick PA-C;  Location: [facility] OR    BRONCHOSCOPY (RIGID OR FLEXIBLE), DIAGNOSTIC N/A 04/29/2022    Procedure: BRONCHOSCOPY, WITH BRONCHOALVEOLAR LAVAGE;  Surgeon: Murtaza Garcia MD;  Location: [facility] GI    BRONCHOSCOPY (RIGID OR FLEXIBLE), DIAGNOSTIC N/A 11/1/2024    Procedure: BRONCHOSCOPY, WITH BRONCHOALVEOLAR LAVAGE;  Surgeon: Murtaza Garcia MD;  Location: [facility] GI    IR CVC TUNNEL PLACEMENT > 5 YRS OF AGE  08/04/2021    IR CVC TUNNEL REMOVAL LEFT  09/02/2021    IR LIVER BIOPSY PERCUTANEOUS  02/21/2022    NEPHRECTOMY  2007    ORTHOPEDIC SURGERY      bilateral shoulder surgeries    PERCUTANEOUS BIOPSY LIVER N/A 02/21/2022    Procedure: NEEDLE BIOPSY, LIVER, PERCUTANEOUS;  Surgeon: Trace Castellanos MD;  Location: [facility] [place] OR    PHACOEMULSIFICATION WITH STANDARD INTRAOCULAR LENS IMPLANT Left 1/15/2024    Procedure: LEFT EYE PHACOEMULSIFICATION, CATARACT, WITH STANDARD INTRAOCULAR LENS IMPLANT INSERTION;  Surgeon: Deshawn Menezes MD;  Location: [facility]  OR    PHACOEMULSIFICATION WITH STANDARD INTRAOCULAR LENS IMPLANT Right 2/5/2024    Procedure: RIGHT EYE PHACOEMULSIFICATION, CATARACT, WITH STANDARD INTRAOCULAR LENS IMPLANT INSERTION;  Surgeon: Deshawn Menezes MD;  Location: [facility] OR        MEDICATIONS:  PTA Meds  Prior to Admission medications    Medication Sig Last Dose Taking? Auth Provider Long Term End Date   acyclovir (ZOVIRAX) 400 MG tablet Take 1 tablet (400 mg) by mouth every 12 hours.  Yes Jayro Koehler MD Yes    atovaquone (MEPRON) 750 MG/5ML suspension Take 5 mLs (750 mg) by mouth 2 times daily for 13 days.  Yes Jayro Koehler MD No 12/10/24   Carboxymethylcell-Glycerin PF (REFRESH RELIEVA PF) 0.5-1 % SOLN Apply 1 drop to eye 4 times daily Preservative free. Either PF multidose bottle or PF vials   Aisha Juarez, ADILENE     dexAMETHasone alcohol-free (DECADRON) 0.1 MG/ML solution Swish and spit 10 mLs (1 mg) in mouth 2 times daily.   Chris Cote MD Yes    escitalopram (LEXAPRO) 10 MG tablet Take 1 tablet (10 mg) by mouth daily   Akshat Bearden MD Yes    fluorometholone (FML LIQUIFILM) 0.1 % ophthalmic suspension Place 1 drop Into the left eye 3 times daily.   Aisha Juarez, OD No    levothyroxine (SYNTHROID/LEVOTHROID) 112 MCG tablet Take 1 tablet (112 mcg) by mouth daily   Natalia Gray MD Yes    LORazepam (ATIVAN) 1 MG tablet Take 1 mg by mouth every 6 hours as needed for anxiety. PRN   Reported, Patient No    metroNIDAZOLE (METROCREAM) 0.75 % external cream Apply topically 2 times daily Apply to the nose.   Zoe Wesley PA-C     nicotine polacrilex (NICORETTE) 4 MG gum Place 4 mg inside cheek daily.   Reported, Patient     Nutritional Supplements (ENSURE COMPLETE SHAKE) LIQD Take 11 mLs by mouth every morning   Reported, Patient     omeprazole (PRILOSEC) 40 MG DR capsule Take 1 capsule (40 mg) by mouth daily   Akshat Bearden MD     penicillin V (VEETID) 250 MG tablet Take 1 Tablet (250 mg) by mouth two  times daily.   Chris Cote MD     predniSONE (DELTASONE) 10 MG tablet Take 1 tablet (10 mg) by mouth daily   Chris Cote MD No    predniSONE (DELTASONE) 5 MG tablet Take 1 tablet (5 mg) by mouth daily. Currently taking 15 mg daily ( on taper)   Chris Cote MD No    rosuvastatin (CRESTOR) 5 MG tablet Take 1 tablet (5 mg) by mouth daily.   Chris Cote MD Yes    sennosides (SENOKOT) 8.6 MG tablet Take 1 tablet by mouth 3 times daily as needed for constipation   Layla Spencer PA-C     sodium chloride 0.9 % neb solution 3 mLs by Other route 2 times daily   Aisha Juarez, ADILENE     tacrolimus (PROTOPIC) 0.1 % external ointment Apply topically 2 times daily   Akshat Bearden MD        Current Meds  Current Facility-Administered Medications   Medication Dose Route Frequency Provider Last Rate Last Admin    acyclovir (ZOVIRAX) tablet 400 mg  400 mg Oral Q12H Skyler Baugh MD   400 mg at 11/28/24 0801    [Held by provider] amLODIPine (NORVASC) tablet 5 mg  5 mg Oral At Bedtime Leeann Dudley PA-C   5 mg at 11/25/24 2231    atovaquone (MEPRON) suspension 750 mg  750 mg Oral BID Leeann Dudley PA-C   750 mg at 11/28/24 0801    carboxymethylcellulose PF (REFRESH PLUS) 0.5 % ophthalmic solution 1 drop  1 drop Ophthalmic 4x Daily Leeann Dudley PA-C   1 drop at 11/28/24 1132    enoxaparin ANTICOAGULANT (LOVENOX) injection 40 mg  40 mg Subcutaneous Q24H Leeann Dudley PA-C   40 mg at 11/27/24 2028    escitalopram (LEXAPRO) tablet 10 mg  10 mg Oral At Bedtime Leeann Dudley PA-C   10 mg at 11/27/24 2202    heparin lock flush 10 unit/mL injection 5-10 mL  5-10 mL Intracatheter Q24H Jayro Koehler MD   5 mL at 11/27/24 1751    heparin lock flush 100 unit/mL injection 5-10 mL  5-10 mL Intracatheter Q28 Days Jayro Koehler MD   5 mL at 11/28/24 1341    levothyroxine (SYNTHROID/LEVOTHROID) tablet 112 mcg  112 mcg Oral Daily Leeann Dudley PA-C   112 mcg at 11/28/24 0801     pantoprazole (PROTONIX) EC tablet 40 mg  40 mg Oral BID Kobi Hernadez MD   40 mg at 24 0801    penicillin V (VEETID) tablet 250 mg  250 mg Oral BID Leeann Dudley PA-C   250 mg at 24 0801    predniSONE (DELTASONE) tablet 15 mg  15 mg Oral Daily Leeann Dudley PA-C   15 mg at 24 0801    rosuvastatin (CRESTOR) tablet 5 mg  5 mg Oral At Bedtime Leeann Dudley PA-C   5 mg at 24 2202    sennosides (SENOKOT) tablet 1 tablet  1 tablet Oral At Bedtime Leeann Dudley PA-C   1 tablet at 24 2202    sodium chloride (PF) 0.9% PF flush 10-20 mL  10-20 mL Intracatheter Q28 Days Jayro Koehler MD        sodium chloride (PF) 0.9% PF flush 3 mL  3 mL Intracatheter Q8H Leeann Dudley PA-C   3 mL at 24 1342     Infusion Meds  Current Facility-Administered Medications   Medication Dose Route Frequency Provider Last Rate Last Admin       ALLERGIES:    No Active Allergies      REVIEW OF SYSTEMS:  A comprehensive of systems was negative except as noted above.    SOCIAL HISTORY:   Social History     Socioeconomic History    Marital status:      Spouse name: Not on file    Number of children: Not on file    Years of education: Not on file    Highest education level: Not on file   Occupational History    Not on file   Tobacco Use    Smoking status: Former     Current packs/day: 0.00     Average packs/day: 2.0 packs/day for 30.0 years (60.0 ttl pk-yrs)     Types: Cigarettes     Start date: 1989     Quit date: 2019     Years since quittin.5     Passive exposure: Past    Smokeless tobacco: Never    Tobacco comments:     DAILY USE OF NICORETTE GUM   Vaping Use    Vaping status: Never Used   Substance and Sexual Activity    Alcohol use: Not Currently    Drug use: Never    Sexual activity: Not on file   Other Topics Concern    Parent/sibling w/ CABG, MI or angioplasty before 65F 55M? Not Asked   Social History Narrative    Not on file     Social Drivers of Health      Financial Resource Strain: Low Risk  (11/27/2024)    Financial Resource Strain     Within the past 12 months, have you or your family members you live with been unable to get utilities (heat, electricity) when it was really needed?: No   Food Insecurity: Low Risk  (11/27/2024)    Food Insecurity     Within the past 12 months, did you worry that your food would run out before you got money to buy more?: No     Within the past 12 months, did the food you bought just not last and you didn t have money to get more?: No   Transportation Needs: Low Risk  (11/27/2024)    Transportation Needs     Within the past 12 months, has lack of transportation kept you from medical appointments, getting your medicines, non-medical meetings or appointments, work, or from getting things that you need?: No   Physical Activity: Not on file   Stress: Not on file   Social Connections: Socially Integrated (5/30/2024)    Received from [facility]    Social Connections     Do you often feel lonely or isolated from those around you?: 0   Interpersonal Safety: Low Risk  (11/25/2024)    Interpersonal Safety     Do you feel physically and emotionally safe where you currently live?: Yes     Within the past 12 months, have you been hit, slapped, kicked or otherwise physically hurt by someone?: No     Within the past 12 months, have you been humiliated or emotionally abused in other ways by your partner or ex-partner?: No   Housing Stability: Low Risk  (11/27/2024)    Housing Stability     Do you have housing? : Yes     Are you worried about losing your housing?: No   Recent Concern: Housing Stability - High Risk (11/27/2024)    Housing Stability     Do you have housing? : No     Are you worried about losing your housing?: No     Reviewed with patient   Spouse and sister accompany Nik Vegasshabana in hospital room    FAMILY MEDICAL HISTORY:   Family History   Problem Relation Age of Onset    Lung Cancer Mother      "Depression Mother     Polycythemia Father     Anesthesia Reaction Father         slow to awaken    Coronary Artery Disease Maternal Grandmother     Diabetes Maternal Grandmother     Hypertension Maternal Grandmother     Hypothyroidism Sister     Glaucoma No family hx of     Macular Degeneration No family hx of     Venous thrombosis No family hx of     Melanoma No family hx of     Skin Cancer No family hx of        PHYSICAL EXAM:   Temp  Av.2  F (36.2  C)  Min: 96.8  F (36  C)  Max: 97.7  F (36.5  C)      Pulse  Av.9  Min: 67  Max: 98 Resp  Av.7  Min: 16  Max: 20  SpO2  Av %  Min: 96 %  Max: 98 %       /79 (BP Location: Left arm)   Pulse 89   Temp 97.4  F (36.3  C) (Oral)   Resp 16   Ht 1.83 m (6' 0.05\")   Wt 117.9 kg (260 lb)   SpO2 97%   BMI 35.22 kg/m     Date 24 0700 - 24 0659   Shift 7298-7273 9560-9211 6756-4440 24 Hour Total   INTAKE   P.O. 480   480   Shift Total(mL/kg) 480(4.36)   480(4.36)   OUTPUT   Urine 200   200   Shift Total(mL/kg) 200(1.82)   200(1.82)   Weight (kg) 110.18 110.18 110.18 110.18      Admit Weight: 118.2 kg (260 lb 9.6 oz)     GENERAL APPEARANCE: sitting up in chair, no acute distress, awake, alert  Pulmonary: no increased work of breathing on room air  CV: regular rhythm per cardiac monitor   - 1+ pitting edema to mid shin b/l   - bilateral pulses symmetric  GI: soft, nontender  SKIN: warm, dry, no cyanosis  NEURO: face symmetric  LABS:   CMP  Recent Labs   Lab 24  0352 24  1755 24  1719 24  1234 24  0325 24  1100 24  0330 24  1911 24  1744 24  1432 24  1451 24  1555   * 130*  --  127* 129*   < > 124*  --  125* 127* 131* 132*   POTASSIUM 4.6 5.0  --  5.4* 4.6   < > 5.0   < > 5.9* 6.2* 5.5* 4.7   CHLORIDE 99 98  --  94* 95*   < > 92*  --  93* 91* 100 100   CO2 21* 21*  --  20* 22   < > 20*  --  18* 20* 20* 19*   ANIONGAP 12 11  --  13 12   < > 12  --  14 16* 11 13 "   GLC 99 145* 124* 115* 103*   < > 112*   < > 108* 130* 101* 169*   BUN 40.5* 39.9*  --  39.8* 39.3*   < > 38.6*  --  40.9* 37.0* 40.0* 38.8*   CR 1.71* 2.10*  --  2.31* 1.95*   < > 1.83*   < > 1.83* 1.87* 1.82* 1.81*   GFRESTIMATED 44* 34*  --  30* 37*   < > 40*   < > 40* 39* 40* 41*   DAMON 9.7 9.8  --  10.4 9.8   < > 10.5*  --  10.9* 11.4* 10.0 9.9   MAG 2.0  --   --   --  2.2  --  2.2  --  2.2  --   --   --    PHOS 3.5  --   --   --  4.3  --  4.2  --   --   --   --   --    PROTTOTAL  --   --   --   --   --   --   --   --  7.3 7.6 6.9 6.7   ALBUMIN  --   --   --   --   --   --   --   --  4.4 4.6 4.1 3.9   BILITOTAL  --   --   --   --   --   --   --   --  0.2 0.2 0.2 0.2   ALKPHOS  --   --   --   --   --   --   --   --  102 106 114 107   AST  --   --   --   --   --   --   --   --  40 42 43 41   ALT  --   --   --   --   --   --   --   --  40 41 39 37    < > = values in this interval not displayed.     CBC  Recent Labs   Lab 11/28/24  0352 11/26/24  0330 11/25/24  1744 11/25/24  1432 11/22/24  1451   HGB  --  12.1* 12.3* 12.6* 11.6*   WBC  --  7.0 7.1 9.0 11.4*   RBC  --  3.74* 3.74* 3.85* 3.53*   HCT  --  35.0* 35.5* 36.3* 32.5*   MCV  --  94 95 94 92   MCH  --  32.4 32.9 32.7 32.9   MCHC  --  34.6 34.6 34.7 35.7   RDW  --  15.9* 16.2* 16.4* 16.6*    219 230 224 231     INRNo lab results found in last 7 days.  ABGNo lab results found in last 7 days.   URINE STUDIES  Recent Labs   Lab Test 11/26/24  1157 10/25/24  1402 07/09/24  1250 02/20/24  1507   COLOR Yellow Yellow Dark Yellow* Yellow   APPEARANCE Slightly Cloudy* Clear Clear Clear   URINEGLC Negative Negative Negative Negative   URINEBILI Negative Negative Negative Negative   URINEKETONE Negative Negative Trace* Negative   SG 1.021 1.024 1.031 1.014   UBLD Negative Negative Negative Negative   URINEPH 6.0 6.0 6.0 6.5   PROTEIN 30* 20* 70* Negative   NITRITE Negative Negative Negative Negative   LEUKEST Negative Negative Negative Negative   RBCU 1 1 1 <1    WBCU 0 <1 1 <1     No lab results found.  PTH  Recent Labs   Lab Test 10/25/24  1303 12/21/23  1136 11/27/23  0753 10/18/22  1412   PTHI 36 58 54 60     IRON STUDIES  Recent Labs   Lab Test 04/02/24  1345 11/14/23  1234 10/10/23  1143 08/15/23  1316 06/13/23  1424 03/28/23  1548 11/08/22  1446 10/18/22  1412 08/18/22  1030 07/08/21  1055   IRON 128 149 48* 38* 66 28* 114  --   --   --      --  283 282 283 279 351  --   --   --    IRONSAT 44  --  17 13* 23 10* 32  --   --   --    CLARE 1,597* 2,686* 1,997* 1,610* 1,474* 1,381* 2,813* 2,348* 1,023* 762*       IMAGING:  All imaging studies reviewed by me.     Skyler Baugh MD

## 2024-11-28 NOTE — DISCHARGE SUMMARY
"[facility]  Hospitalist Discharge Summary      Date of Admission:  11/25/2024  Date of Discharge:  11/28/2024  Discharging Provider: Jayro Koehler MD  Discharge Service: Hospitalist Service, GOLD TEAM 8    Discharge Diagnoses   #Acute on chronic kidney disease with Hyperkalemia 2/2 bactrim use  #Hyponatremia    Clinically Significant Risk Factors     # Obesity: Estimated body mass index is 35.22 kg/m  as calculated from the following:    Height as of this encounter: 1.83 m (6' 0.05\").    Weight as of this encounter: 117.9 kg (260 lb).       Follow-ups Needed After Discharge   Follow-up Appointments       Adult [facility] Follow-up and recommended labs and tests      Follow up with primary care provider, Yamilet Hinojosa MD, within 7 days for hospital follow- up and to follow up on results.  The following labs/tests are recommended: BMP, ordered to be drawn on 12/2/24.    Follow up with ID and BMT as scheduled.      Appointments on [place] and/or [facility] (with [facility] or [facility] provider or service). Call [phone] if you haven't heard regarding these appointments within 7 days of discharge.            - follow-up BMP to ensure Cr improving off bactrim  - if renal function normalized, can increase acyclovir dosing back to prior 800 mg BID  - if renal function normalized, can restart amlodipine if appropriate  - follow-up with ID as scheduled, continue atovaquone until told otherwise by ID  - follow-up with nephrology as scheduled    Unresulted Labs Ordered in the Past 30 Days of this Admission       Date and Time Order Name Status Description    11/1/2024 12:36 PM Fungal or Yeast Culture Routine Preliminary     11/1/2024 12:36 PM Nocardia species culture Preliminary     11/1/2024 12:36 PM Acid-Fast Bacilli Culture and Stain In process     11/1/2024 12:36 PM Fungal or Yeast Culture Routine Preliminary     11/1/2024 12:36 PM Nocardia species culture " Preliminary     11/1/2024 12:36 PM Acid-Fast Bacilli Culture and Stain In process         These results will be followed up by ID    Discharge Disposition   Discharged to home  Condition at discharge: Stable    Hospital Course   Nik Nava is a 64 year old male with PMH Acute lymphoblastic leukemia s/p VERÓNICA allo with 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     #Acute on chronic kidney disease with Hyperkalemia 2/2 bactrim use, improving  #Hyponatremia  Nik was diagnosed with PJP pneumonia after BAL on 11/1/2024. He was started on Bactrim 11/18 with plan for 21 day course. He had labs 11/22 which revealed elevated Cr from baseline (1.82), hyponatremia (131) and mild hyperkalemia (5.5). He had repeat outpatient labs today with results trending up with Cr 1.87 and potassium level 6.2 with trending down sodium level at 127. and therefore was sent to ED. No EKG changes. Patient reports general malaise/feeling unwell with worsening leg movements/jittery and anorexia. He had vomiting x 2 today. In ED, he received calcium gluconate 1 g IV, Lasix 20 mg IV, regular insulin 10 units IV, Dextrose 25 g, and Lokelma 10g with improvement in potassium to 5.9.   Nephro consulted, adjusted acyclovir dosing for renal dosing and held bactrim. Cr was improved on day of discharge, though still above baseline CKD, nephro agreed patient's electrolytes and creatinine were stable for discharge with the following plan:  - BMP on 12/2, follow-up with PCP within 7 days to follow-up results, adjust acyclovir and amlodipine as appropriate  - cont atovaquone until ID follow-up  - nephrology follow-up scheduled    #PJP Pneumonia  Patient reports intermittent fever, chills and dypsnea on exertion [id number]. Peak temp at home 101.8F. Evaluated outpatient and started 7 day course of doxycycline.  Extensive work up revealed PJP positive BAL 1/1/2024. Started on Bactrim 11/18 with plan for 21 day course. Most recent CT Chest with contrast on 11/21/2024 with scattered groundglass opacities within the bilateral lung fields suggestive of infection. Similar to CT 10/21/2024 but now slightly increased in the right lower lobe apex. Cased discussed with ID by ED provider and antibiotic changed to atovaquone.   -Stopped PTA bactrim  -Cont atovaquone 750 mg twice daily   -ID consulted, appreciate recs    #Acute lymphoblastic leukemia s/p VERÓNICA allo  #Chronic GVHD  Reviewed recent Oncology note from 11/22/2024. 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 History of oral GVHD but patient states dose not currently need his s/s dexamethasone  -Cont Refresh eye drops. Not currently using fluorometholone eye drops  -Cont prednisone 15 mg daily   -BMT following  -Cont penicillin V and acyclovir for prophylaxis   - meningitis vaccines given PTD    #Rhinovirus infection  Reports recent congestion and cough. Tested positive to rhinovirus 11/18 and 11/21 on viral panel  -Supportive care    #Anemia of chronic disease  Stable  -Monitor    #History of Grave's disease  -Cont synthroid  -Scheduled for virtual Endo appointment today.     #Hyperlipidemia  -Cont statin      Diet:  Regular   DVT Prophylaxis: Enoxaparin (Lovenox) SQ  Ga Catheter: Not present  Lines: PRESENT             Cardiac Monitoring: None  Code Status:  Full Code. Discussed with patient. Wife present.     Consultations This Hospital Stay   INFECTIOUS DISEASE GENERAL ADULT IP CONSULT  INFECTIOUS DISEASE GENERAL ADULT IP CONSULT  ALLOGENEIC BMT ADULT IP CONSULT  CARE MANAGEMENT / SOCIAL WORK IP CONSULT  INFECTIOUS DISEASE TRANSPLANT HSCT/ HEME MALIG ADULT IP CONSULT  NEPHROLOGY GENERAL ADULT IP CONSULT  CARE MANAGEMENT / SOCIAL WORK IP CONSULT    Code Status   Full Code    Time Spent  on this Encounter   I, Jayro oKehler MD, personally saw the patient today and spent greater than 30 minutes discharging this patient.       Jayro Koehler MD  [facility] [address] BMT [address] [address]  Phone: [phone]  Fax: [phone]  ______________________________________________________________________    Physical Exam   Vital Signs: Temp: 97.4  F (36.3  C) Temp src: Oral BP: 134/79 Pulse: 89   Resp: 16 SpO2: 97 % O2 Device: None (Room air)    Weight: 260 lbs 0 oz  Gen: no acute distress, alert  CV: RRR, no murmurs  Pulm: no increased WOB  Abd: soft, nl BS  Extremities: 1+ LE edema       Primary Care Physician   Yamilet Hinojosa MD    Discharge Orders      Basic metabolic panel     Reason for your hospital stay    You were admitted due to kidney injury and elevated potassium due to bactrim, which was stopped and switched to atovaquone to treat your pneumonia.     Activity    Your activity upon discharge: activity as tolerated     Adult [facility] Follow-up and recommended labs and tests    Follow up with primary care provider, Yamilet Hinojosa MD, within 7 days for hospital follow- up and to follow up on results.  The following labs/tests are recommended: BMP, ordered to be drawn on 12/2/24.    Follow up with ID and BMT as scheduled.      Appointments on [place] and/or [facility] (with [facility] or [facility] provider or service). Call [phone] if you haven't heard regarding these appointments within 7 days of discharge.     Diet    Follow this diet upon discharge: Current Diet:Orders Placed This Encounter      Combination Diet Regular Diet Adult       Significant Results and Procedures   Most Recent 3 BMP's:  Recent Labs   Lab Test 11/28/24  0352 11/27/24  1755 11/27/24  1719 11/27/24  1234   * 130*  --  127*   POTASSIUM 4.6 5.0  --  5.4*   CHLORIDE 99 98  --  94*   CO2 21* 21*  --  20*   BUN 40.5* 39.9*  --  39.8*   CR 1.71* 2.10*   --  2.31*   ANIONGAP 12 11  --  13   DAMON 9.7 9.8  --  10.4   GLC 99 145* 124* 115*   ,   Results for orders placed or performed during the hospital encounter of 11/21/24   CT Chest Pulmonary Embolism w Contrast    Narrative    EXAMINATION: CTA pulmonary angiogram, 11/21/2024 6:44 PM     COMPARISON: None.    HISTORY: hx cancer, increasing sob, hx pe not on thinners    TECHNIQUE: Volumetric helical acquisition of CT images of the chest  from the lung apices to the kidneys were acquired after the  administration of 80 mL of Isovue-370 IV contrast. Post-processed  multiplanar and/or MIP reformations were obtained, archived to PACS  and used in interpretation of this study.     FINDINGS:  Contrast bolus is: excellent.  Exam is negative for acute  pulmonary embolism.       The largest right ventricle transaxial diameter is (measured from  endocardium to endocardium): 4.1 cm   The largest left ventricle transaxial diameter is (measured from  endocardium to endocardium): 4.5 cm  RV/LV ratio is: 0.91 (if ratio greater than 1.1 then sign is  suspicious for right heart strain)  Reflux of contrast into the IVC? Minimal  Paradoxical bowing of the interventricular septum to the left? no  Pericardial effusion?:not present    Scattered ground glass opacities within the bilateral lung apices and  right lower lobe. Mild dependent atelectasis. The central  tracheobronchial tree is patent. Severely atrophic spleen.  Postsurgical changes of right nephrectomy. Left renal cyst. The  remaining upper abdomen is unremarkable. Multiple chronic appearing  rib fractures.      Impression    IMPRESSION:   1. Exam is negative for acute pulmonary embolism.       Evidence for right heart strain or increased right heart pressures?   is not present.     2. Scattered groundglass opacities within the bilateral lung fields  are suggestive of infection. Similar to CT 10/21/2024 but now slightly  increased in the right lower lobe apex.   (Patient positive  for PCP  and Rhino virus.)     In the event of a positive result for acute pulmonary embolism  resulting in right heart strain, consider calling the   [facility] patient placement ([phone]) for PERT (Pulmonary Embolism  Response Team) Activation?    PERT -- Pulmonary Embolism Response Team (Multidisciplinary team  including cardiology, interventional radiology, critical care,  hematology)    I have personally reviewed the examination and initial interpretation  and I agree with the findings.    BEVERLY SHERWOOD MD         SYSTEM ID:  [id number]     *Note: Due to a large number of results and/or encounters for the requested time period, some results have not been displayed. A complete set of results can be found in Results Review.       Discharge Medications   Current Discharge Medication List        START taking these medications    Details   atovaquone (MEPRON) 750 MG/5ML suspension Take 5 mLs (750 mg) by mouth 2 times daily for 13 days.  Qty: 130 mL, Refills: 0    Associated Diagnoses: Pneumonia due to Pneumocystis jirovecii, unspecified laterality, unspecified part of lung (H)           CONTINUE these medications which have CHANGED    Details   acyclovir (ZOVIRAX) 400 MG tablet Take 1 tablet (400 mg) by mouth every 12 hours.           CONTINUE these medications which have NOT CHANGED    Details   Carboxymethylcell-Glycerin PF (REFRESH RELIEVA PF) 0.5-1 % SOLN Apply 1 drop to eye 4 times daily Preservative free. Either PF multidose bottle or PF vials  Qty: 30 each, Refills: 11    Associated Diagnoses: Dry eye      dexAMETHasone alcohol-free (DECADRON) 0.1 MG/ML solution Swish and spit 10 mLs (1 mg) in mouth 2 times daily.  Qty: 500 mL, Refills: 2    Associated Diagnoses: Status post bone marrow transplant (H); 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)      escitalopram (LEXAPRO) 10 MG tablet Take 1 tablet (10 mg) by mouth daily  Qty: 30 tablet, Refills: 3    Associated Diagnoses: Status post bone marrow  transplant (H)      fluorometholone (FML LIQUIFILM) 0.1 % ophthalmic suspension Place 1 drop Into the left eye 3 times daily.  Qty: 5 mL, Refills: 1    Associated Diagnoses: Dry eye; Subconjunctival hemorrhage of left eye      levothyroxine (SYNTHROID/LEVOTHROID) 112 MCG tablet Take 1 tablet (112 mcg) by mouth daily  Qty: 90 tablet, Refills: 4    Associated Diagnoses: Postablative hypothyroidism      LORazepam (ATIVAN) 1 MG tablet Take 1 mg by mouth every 6 hours as needed for anxiety. PRN      metroNIDAZOLE (METROCREAM) 0.75 % external cream Apply topically 2 times daily Apply to the nose.  Qty: 45 g, Refills: 3    Associated Diagnoses: Rosacea      nicotine polacrilex (NICORETTE) 4 MG gum Place 4 mg inside cheek daily.      Nutritional Supplements (ENSURE COMPLETE SHAKE) LIQD Take 11 mLs by mouth every morning      omeprazole (PRILOSEC) 40 MG DR capsule Take 1 capsule (40 mg) by mouth daily  Qty: 30 capsule, Refills: 3    Associated Diagnoses: Status post bone marrow transplant (H); 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)      penicillin V (VEETID) 250 MG tablet Take 1 Tablet (250 mg) by mouth two times daily.  Qty: 60 tablet, Refills: 1    Associated Diagnoses: 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)      !! predniSONE (DELTASONE) 10 MG tablet Take 1 tablet (10 mg) by mouth daily  Qty: 90 tablet, Refills: 2    Associated Diagnoses: 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)      !! predniSONE (DELTASONE) 5 MG tablet Take 1 tablet (5 mg) by mouth daily. Currently taking 15 mg daily ( on taper)  Qty: 90 tablet, Refills: 1    Associated Diagnoses: 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)      rosuvastatin (CRESTOR) 5 MG tablet Take 1 tablet (5 mg) by mouth daily.  Qty: 30 tablet, Refills: 3    Associated Diagnoses: Mixed hyperlipidemia      sennosides (SENOKOT) 8.6 MG tablet Take 1 tablet by mouth 3 times daily as needed for constipation  Qty: 90 tablet, Refills: 0     Associated Diagnoses: Status post bone marrow transplant (H); 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)      sodium chloride 0.9 % neb solution 3 mLs by Other route 2 times daily  Qty: 300 mL, Refills: 11    Associated Diagnoses: Dry eye; 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)      tacrolimus (PROTOPIC) 0.1 % external ointment Apply topically 2 times daily  Qty: 30 g, Refills: 1    Associated Diagnoses: 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)       !! - Potential duplicate medications found. Please discuss with provider.        STOP taking these medications       amLODIPine (NORVASC) 5 MG tablet Comments:   Reason for Stopping:         doxycycline hyclate (VIBRAMYCIN) 100 MG capsule Comments:   Reason for Stopping:         sulfamethoxazole-trimethoprim (BACTRIM DS) 800-160 MG tablet Comments:   Reason for Stopping:         sulfamethoxazole-trimethoprim (BACTRIM DS) 800-160 MG tablet Comments:   Reason for Stopping:             Allergies   No Active Allergies

## 2024-11-28 NOTE — PLAN OF CARE
Goal Outcome Evaluation:    Plan of care reviewed with: patient   Overall patient progress: no change    Time: 6102-2397    A&Ox4, independent for activity. Recheck potasium level 5.0. Urine sent for sodium level and urine osmolality, both within normal limit. Sodium level from blood specimen also improved from 127 to 132. Denied nausea/vomiting. VS stable on room air and afebrile, hypertensive intemittently. Denied SOB or coughing. Port cath Hep locked. Voiding without difficult. Last bm yesterday. Will continue to monitor.

## 2024-11-28 NOTE — PLAN OF CARE
"  Problem: Adult Inpatient Plan of Care  Goal: Plan of Care Review  Description: The Plan of Care Review/Shift note should be completed every shift.  The Outcome Evaluation is a brief statement about your assessment that the patient is improving, declining, or no change.  This information will be displayed automatically on your shift  note.  Outcome: Met  Goal: Patient-Specific Goal (Individualized)  Description: You can add care plan individualizations to a care plan. Examples of Individualization might be:  \"Parent requests to be called daily at 9am for status\", \"I have a hard time hearing out of my right ear\", or \"Do not touch me to wake me up as it startles  me\".  Outcome: Met  Goal: Absence of Hospital-Acquired Illness or Injury  Outcome: Met  Intervention: Identify and Manage Fall Risk  Recent Flowsheet Documentation  Taken 11/28/2024 1200 by Chelo Nicholas RN  Safety Promotion/Fall Prevention:   clutter free environment maintained   increase visualization of patient   lighting adjusted   nonskid shoes/slippers when out of bed   room near nurse's station   safety round/check completed  Taken 11/28/2024 0800 by Chelo Nicholas RN  Safety Promotion/Fall Prevention:   clutter free environment maintained   increase visualization of patient   lighting adjusted   nonskid shoes/slippers when out of bed   room near nurse's station   safety round/check completed  Intervention: Prevent Skin Injury  Recent Flowsheet Documentation  Taken 11/28/2024 1200 by Chelo Nicholas RN  Body Position: position changed independently  Taken 11/28/2024 0800 by Chelo Nicholas RN  Body Position: position changed independently  Skin Protection: adhesive use limited  Intervention: Prevent and Manage VTE (Venous Thromboembolism) Risk  Recent Flowsheet Documentation  Taken 11/28/2024 0800 by Chelo Nicholas RN  VTE Prevention/Management: SCDs off (sequential compression devices)  Intervention: Prevent " Infection  Recent Flowsheet Documentation  Taken 11/28/2024 1200 by Chelo Nicholas RN  Infection Prevention:   rest/sleep promoted   single patient room provided  Taken 11/28/2024 0800 by Chelo Nicholas RN  Infection Prevention:   rest/sleep promoted   single patient room provided  Goal: Optimal Comfort and Wellbeing  Outcome: Met  Goal: Readiness for Transition of Care  Outcome: Met     Problem: Stem Cell/Bone Marrow Transplant  Goal: Optimal Coping with Transplant  Outcome: Met  Goal: Symptom-Free Urinary Elimination  Outcome: Met  Intervention: Prevent or Manage Bladder Irritation  Recent Flowsheet Documentation  Taken 11/28/2024 0800 by Chelo Nicholas RN  Urinary Elimination Promotion: frequent voiding encouraged  Hyperhydration Management: fluids provided  Goal: Diarrhea Symptom Control  Outcome: Met  Intervention: Manage Diarrhea  Recent Flowsheet Documentation  Taken 11/28/2024 0800 by Chelo Nicholas RN  Skin Protection: adhesive use limited  Fluid/Electrolyte Management: fluids provided  Goal: Improved Activity Tolerance  Outcome: Met  Intervention: Promote Improved Energy  Recent Flowsheet Documentation  Taken 11/28/2024 1200 by Chelo Nicholas RN  Activity Management: up ad eileen  Taken 11/28/2024 0800 by Chelo Nicholas RN  Sleep/Rest Enhancement: awakenings minimized  Activity Management: up ad eileen  Goal: Optimal Functional Ability  Outcome: Met  Intervention: Optimize Functional Ability  Recent Flowsheet Documentation  Taken 11/28/2024 1200 by Chelo Nicholas RN  Activity Management: up ad eileen  Taken 11/28/2024 0800 by Chelo Nicholas RN  Activity Management: up ad eileen  Goal: Blood Counts Within Acceptable Range  Outcome: Met  Intervention: Monitor and Manage Hematologic Symptoms  Recent Flowsheet Documentation  Taken 11/28/2024 1200 by Chelo Nicholas RN  Bleeding Precautions: blood pressure closely monitored  Medication  Review/Management: medications reviewed  Taken 11/28/2024 0800 by Chelo Nicholas RN  Sleep/Rest Enhancement: awakenings minimized  Bleeding Precautions: blood pressure closely monitored  Medication Review/Management: medications reviewed  Goal: Absence of Hypersensitivity Reaction  Outcome: Met  Goal: Absence of Infection  Outcome: Met  Intervention: Prevent and Manage Infection  Recent Flowsheet Documentation  Taken 11/28/2024 1200 by Chelo Nicholas RN  Infection Prevention:   rest/sleep promoted   single patient room provided  Infection Management: aseptic technique maintained  Isolation Precautions:   droplet precautions maintained   protective environment maintained  Taken 11/28/2024 0800 by Chelo Nicholas RN  Infection Prevention:   rest/sleep promoted   single patient room provided  Infection Management: aseptic technique maintained  Isolation Precautions:   droplet precautions maintained   protective environment maintained  Goal: Improved Oral Mucous Membrane Health and Integrity  Outcome: Met  Intervention: Promote Oral Comfort and Health  Recent Flowsheet Documentation  Taken 11/28/2024 0800 by Chelo Nicholas RN  Oral Care: oral rinse provided  Goal: Nausea and Vomiting Symptom Relief  Outcome: Met  Intervention: Prevent and Manage Nausea and Vomiting  Recent Flowsheet Documentation  Taken 11/28/2024 0800 by Chelo Nicholas RN  Nausea/Vomiting Interventions: (denies) other (see comments)  Oral Care: oral rinse provided  Goal: Optimal Nutrition Intake  Outcome: Met   Goal Outcome Evaluation:  Vss. No pain. No nausea. Coughing and in droplet for rhino virus. Ate good. Received meningococcal vaccines. Hard stool and received senna. Discharge teaching was done and discharged home with mepron.

## 2024-11-29 LAB
BACTERIA BRONCH: NO GROWTH

## 2024-12-03 NOTE — PROGRESS NOTES
BMT Clinic Note  12/11/2024    ID:  Nki Nava is a 64 year old man D+1219 s/p NMA allo sib PBSCT for Ph+ ALL, cGVHD enrolled on PQRST study - completed.     Recently with diagnosis of PJP pneumonia. 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    HPI: Nik returns today for follow-up of recent treatment for PJP pneumonia and admission due to Bactrim toxicity.  He has completed his course of atovaquone, recovered from the Bactrim toxicity, and he is generally feeling well.  His signs or symptoms of GVHD at this point include dry eyes and dry mouth.  He has no active mucositis or active rash.    ROS: 10 point Review of systems was negative except as detailed above     PHYSICAL EXAM          Blood pressure (!) 140/84, pulse 76, temperature 97.9  F (36.6  C), temperature source Oral, resp. rate 16, weight 119.6 kg (263 lb 11.2 oz), SpO2 97%.    Wt Readings from Last 4 Encounters:   12/11/24 119.6 kg (263 lb 11.2 oz)   12/10/24 120.9 kg (266 lb 9 oz)   12/10/24 122.3 kg (269 lb 9.6 oz)   11/28/24 117.9 kg (260 lb)      KPS: 70  General: NAD.  HEENT: sclera anicteric. Lichenoid changes and associated erythema of b/l buccal mucosa.  No active ulceration.  Lymph nodes: No lymphadenopathy in his head neck region, upper chest, or axilla  Lungs: CTA b/l  no wheezes  CV: RRR  no gallop  Abd: Soft, no tenderness to palpation  Ext/Skin: Chronic skin GVHD change at back. Faint scattered erythema lesion. Xerosis.      Chronic GVHD          12/11/2024    09:07 10/27/2024    09:21   Chronic GVHD   Has chronic qgjwc-pdlhao-nnjm disease developed since the last entry? No No   Is there persistence of chronic sowhh-vjantc-jzjn disease since the last entry? Yes Yes   Chronic Oucum-Rugwsc-Jdlq Disease Global Severity Score  Moderate   Total Chronic Puors-Gtuvif-Kfak Disease Score 2 3   Subjective Clinician Opinion of Severity Mild Mild       Blood Counts     Recent Labs   Lab Test 12/11/24  0719 11/28/24  0352 11/26/24  0330  11/25/24  1744 11/25/24  1432   HGB 11.3*  --  12.1* 12.3* 12.6*   HCT 33.2*  --  35.0* 35.5* 36.3*   WBC 7.9  --  7.0 7.1 9.0   ANEUTAUTO 3.5  --   --  4.5 4.6   ALYMPAUTO 3.4  --   --  1.9 3.7   AMONOAUTO 0.8  --   --  0.6 0.5   AEOSAUTO 0.1  --   --  0.0 0.0   ABSBASO 0.0  --   --  0.0 0.0   NRBCMAN 0.0  --   --  0.0 0.0    188 219 230 224       Chemistries     Basic Panel  Recent Labs   Lab Test 12/11/24  0719 11/28/24  0352 11/27/24  1755    132* 130*   POTASSIUM 4.1 4.6 5.0   CHLORIDE 104 99 98   CO2 27 21* 21*   BUN 29.5* 40.5* 39.9*   CR 1.09 1.71* 2.10*   * 99 145*      Calcium, Magnesium, Phosphorus  Recent Labs   Lab Test 12/11/24  0719 11/28/24  0352 11/27/24  1755 11/27/24  1234 11/27/24  0325 11/26/24  1100 11/26/24  0330   DAMON 9.7 9.7 9.8   < > 9.8   < > 10.5*   MAG  --  2.0  --   --  2.2  --  2.2   PHOS  --  3.5  --   --  4.3  --  4.2    < > = values in this interval not displayed.      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     1. Acute lymphoblastic leukemia, Melvin chromosome positive. S/p VERÓNICA allo sib PBSCT    Most recent restaging at 2Y consistent with MRD negative CR. BM % donor. FISH No evidence of BCR::ABL1 fusion. CG No recipient (male) cells or cells with a t(9;22) or other clonal chromosomal abnormality were detected among the 20 metaphases analyzed.    -BCRABL monitoring quarterly    2. HEME:   Counts recovered, transfusion independent.    Anemia of chronic disease  Fluctuated during prior infection. Periodically monitor ferritin.     3. 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   Liver cGVHD biopsy proven  2/21/2022; resolved.     Ocular GVHD - NIH score 1  Follows with ophthalmology, last visit 10/11/2024.  - Currently using scleral lenses and PF AT. Not on topical steroids now.     Oral GVHD - NIH score 1  Lichenoid change of L buccal mucosa. Adequate pain control.  - Dexamethasone s/s prn    Systemic treatment for GVHD  Corticosteroids - On steroid chronically due to frequent flare of GVH with taper. Recently with GVHD flare while tapered down to 10 mg daily, most recently adjusted to 15 mg daily which is his current dose.  - Continue prednisone 15 mg daily. Will consider slow taper, +\- steroid sparing agents if symptoms stable at next visit.    GVHD history  cGVHD therapy started on February 24 2022  - Baseline NIH scoring 2/24/2022 : Skin 2 maculopapular rash/erythema with no sclerotic features, mouth score 1 mild symptoms with lichenoid changes less than 25%, eyes score 2 moderate symptoms without new visual impairments due to KCS requiring lubricant eyedrops more than 3 times a day, overall mild scale for her provider     Previous therapies  Tacrolimus - Experienced mild NEGRA, hyperkalemia, hypomagnesemia, and reports some tremors and cramps. Switched to sirolimus Sep 2022.  Sirolimus - Discontinued Oct 2022 due to GVH flare and significant fluid retention and proteinuria.  Belumosudil - Patient intolerant/with disease flares on tacrolimus and sirolimus as above. This was started on Oct 2022 - skin/liver/oral GVHD inactive, and occular symptoms controlled/symptomatic improvement. Discontinued Oct 2023 due to recurrent infections.  Ruxolitinib - Dec 2024: We will plan to start 5 mg twice daily, with a plan to continue in order to taper prednisone down to 5 mg daily or equivalent or below.    4. ID  PJP pneumonia  Recent history of persistent fever with patchy peribronchovascular groundglass opacities on CT (10/21/2024). Extensive work-up revealed PJP positive from BAL (11/1/2024)  - Continue high-dose bactrim  per ID recommendation (11/18/2024 - , plan for 21 days)  - Recommend folic acid supplement while on high-dose bactrim    Rhonovirus infection  Tested positive on 11/18/2024 after he developed fatigue, malaise, URI symptoms.    Prophylaxis   - ACV Patient developed oral herpes reactivation after stopping acyclovir in Jan 2023  - Penicillin 250 mg BID due to atrophic spleen  Hypogammaglobulinemia with recurrent infections: Maintain IgG > 400    Vaccinations  Completed 1Y and 2Y vaccination series.  Received his influenza/COVID vaccine 9/24/2024 and RSV vaccine 8/13.   Complete pneumococcal and HiB vaccine. Recommend meningococcal vaccine once infection resolved.    5. Renal  History of RCC s/p R nephrectomy in 2007.    Elevated Cr  Hyponatremia and hyperkalemia  Elevated Cr at fro baseline around 1.2 with mild hyponatremia and mild hyperkalemia secondary to Bactrim. Given overall stability of Cr, more likely related to decreased Cr excretion from bactrim than actual kidney injury.  - Closely monitor renal function and electrolyte    6. Endo:   - Hx of graves disease; On synthroid follows with endocrine  - HLD; On rosuvastatin    7. CV:   - Hypertension; On amlodipine    8. Psych:   - Situational anxiety; Lexapro 10mg daily.     Summary: Today I talked about the pros and cons of different approaches to treat his GVHD primarily for the purpose of tapering down his prednisone.  We agreed to begin ruxolitinib 5 mg twice daily.  In addition he will taper his prednisone to 15 mg alternating with 10.  Will plan for monthly tapering of the prednisone with a goal of discontinuation sometime over the next 6 months or so depending on his hypoadrenal state.    Plan:  - Taper prednisone 15/10 mg daily  - start TMP/sulfa at prophylaxis dosing  - continue acyclovir, PenVK  - start ruxolitinib 5 mg BID  - BMP weekly x 2  - RTC 1 month for GVHD review and to continue taper  - review vaccines next visit    The longitudinal plan of care  for the diagnosis(es)/condition(s) as documented were addressed during this visit. Due to the added complexity in care, I will continue to support Nik in the subsequent management and with ongoing continuity of care.      30 minutes spent on the date of the encounter doing chart review, history and exam, lab review, documentation and coordniating care.    DOMINIQUE BRITTON MD  December 11, 2024

## 2024-12-05 ENCOUNTER — MYC REFILL (OUTPATIENT)
Dept: TRANSPLANT | Facility: CLINIC | Age: 64
End: 2024-12-05
Payer: MEDICARE

## 2024-12-05 DIAGNOSIS — C91.01 ACUTE LYMPHOBLASTIC LEUKEMIA (ALL) IN REMISSION (H): ICD-10-CM

## 2024-12-05 RX ORDER — PENICILLIN V POTASSIUM 250 MG/1
250 TABLET, FILM COATED ORAL 2 TIMES DAILY
Qty: 60 TABLET | Refills: 1 | Status: SHIPPED | OUTPATIENT
Start: 2024-12-05

## 2024-12-09 ENCOUNTER — VIRTUAL VISIT (OUTPATIENT)
Dept: ENDOCRINOLOGY | Facility: CLINIC | Age: 64
End: 2024-12-09
Payer: MEDICARE

## 2024-12-09 ENCOUNTER — ANCILLARY PROCEDURE (OUTPATIENT)
Dept: CT IMAGING | Facility: CLINIC | Age: 64
End: 2024-12-09
Attending: INTERNAL MEDICINE
Payer: MEDICARE

## 2024-12-09 ENCOUNTER — TELEPHONE (OUTPATIENT)
Dept: ENDOCRINOLOGY | Facility: CLINIC | Age: 64
End: 2024-12-09

## 2024-12-09 DIAGNOSIS — Z79.52 ON CORTICOSTEROID THERAPY: Primary | ICD-10-CM

## 2024-12-09 DIAGNOSIS — Z94.81 STATUS POST BONE MARROW TRANSPLANT (H): ICD-10-CM

## 2024-12-09 DIAGNOSIS — R93.89 ABNORMAL CHEST CT: ICD-10-CM

## 2024-12-09 DIAGNOSIS — E89.0 POSTABLATIVE HYPOTHYROIDISM: ICD-10-CM

## 2024-12-09 DIAGNOSIS — Z86.39 HISTORY OF GRAVES' DISEASE: ICD-10-CM

## 2024-12-09 PROCEDURE — G2211 COMPLEX E/M VISIT ADD ON: HCPCS | Mod: 95

## 2024-12-09 PROCEDURE — G1010 CDSM STANSON: HCPCS | Performed by: RADIOLOGY

## 2024-12-09 PROCEDURE — 99214 OFFICE O/P EST MOD 30 MIN: CPT | Mod: 95

## 2024-12-09 PROCEDURE — 71250 CT THORAX DX C-: CPT | Mod: MF | Performed by: RADIOLOGY

## 2024-12-09 RX ORDER — LEVOTHYROXINE SODIUM 112 UG/1
112 TABLET ORAL DAILY
Qty: 90 TABLET | Refills: 4 | Status: SHIPPED | OUTPATIENT
Start: 2024-12-09

## 2024-12-09 NOTE — PATIENT INSTRUCTIONS
DXA at the [facility] ([phone] to schedule)    Let me know if you get to a prednisone dose of 7 mg/day or lower (in which case we can test the adrenal axis and continue to work on tapering together).

## 2024-12-09 NOTE — NURSING NOTE
Current patient location: [id number] [facility] [address]    Is the patient currently in the state of [place]? YES    Visit mode:VIDEO    If the visit is dropped, the patient can be reconnected by:VIDEO VISIT: Text to cell phone:   Telephone Information:   Mobile [phone]       Will anyone else be joining the visit? NO  (If patient encounters technical issues they should call [phone])    Are changes needed to the allergy or medication list? Pt stated no med changes    Are refills needed on medications prescribed by this physician? NO    Rooming Documentation:  Not applicable    Reason for visit: RECHECK (Follow-up test results from encounter 2/28/24)    Brandy CASSIDY

## 2024-12-09 NOTE — LETTER
12/9/2024       RE: Nik Nava  [id number]  [place] [address]     Dear Colleague,    Thank you for referring your patient, Nik Nava, to the [facility]. Please see a copy of my visit note below.    Endocrine Video visit-     Attending Assessment/Plan :     Postablative hypothyroidism -   On LT4 112 mcg/day with normal TSH.     History of Graves' hyperthyroidism s/p 131I treatment 1996.  He does not have LUZ MARIA    On corticosteroid .  This is a risk for the bone and for suppression of the HPA.     Discussed.    The steroid should be managed based on the GVHD (oncology), not by endocrine.  If steroid gets close to physiological range (7 mg or less)  we can test and take over then.   Repeat DXA now and low threshold to treat prn     History of BMT with GVHD - as above .  The GVHD is determining the prednisone dose at present.  This is not an endocrine condition.      The Longitudinal plan of care for post ablative hypothyroidism, risks of being on corticosteroid was addressed during this visit. Due to added complexity of care, we will continue to support Nik , and the subsequent management of this condition(s) and with the ongoing continuity of care of this condition.    Due to the continued risks of COVID 19 transmission and to improve ease of access this visit was a video visit. The patient gave verbal consent for the visit today.    I have independently reviewed and interpreted labs, imaging as indicated.     Distant Location (provider location):  Off-site  Mode of Communication:  Video Conference via GoTable  Chart review/prep time 1  [id number]; [id number]  Visit Start time  1554   Visit Stop time  1609   _34_ minutes spent on the date of the encounter doing chart review, history and exam, documentation and further activities as noted above.     Natalia Gray MD    Chief complaint/ HPI:  Nik returns for follow up of postablative hypothyroidism, history of graves disease, history of corticosteroid causing low cortisol.  I have seen him once before 3/2023. At that time he was on lT4 150 mcg/day.  He is now on LT4 112 mcg/day since 2/22/24 .  He same day cancelled our recent appt due to hospitalization 11/25-11/28/24 with acute on chronic kidney disease with high K due to bactrim which he was getting for PCP pneumonia.      In 1996 he was treated with 16 mCi 131I for hyperthyroidism.  We do not have thyroid lab values prior to 2003.  Since 11/18/2021 the lT4 dose has been progressively reduced from 200 to the current dose of 112 mc/day.  He has been on the current dose of  112 mcg/day since 2/2024 .  Recent TSh was normal on this dose.     BMT 8/10/2021 included intermittent steroid treatments. Continuous steroid started after he was diagnosed with GVHD involving eye, skin, mouth, liver.  He started on high dose prednisone 100 mg 3/1/22.  At our last appt he was prednisone 4 mg/day  for one week, a reduction.  He is currently on prednisone 15 mg/day.  He has been on this dose since fall,2024 an increase due to mouth GVHD.      He did well on 15/10 all summer.  The dose was tapered to 10 mg/day and then he had flare of GVHD.  He is now back on Prednisone 15 mg/day.      Selected Endocrine relevant labs are as follows:  See my 3/1/23 for labs 2003 to 2023  3/3/2023 0748 cortisol 2 -- strong suggestion of HPA suppression - he should be on minimum dose prednisone 5 mg/day or equivalent.   5/2/23 1134 AM.  TSH 0.15, free T4 1.55, T3   6/20/23 0723 cortisol 0.5   8/15/23 TSH 0.16, free T4 1.71, Ca 10, creatinine 1.18,   11/14/23 TSH 0.08,f ree T4 1.83  --on LT4 137,   Reduce to LT4 125 mcg/day  11/2723 TSH 0.89, free T4 1.37, t3 88  1/29/24 0935 AM not ordered by me , unknown circumstances (Dr Chris Hilton): testosterone 134, PSA 0.83-- note reports ED, fatigue not able to tolerate CPAP mask  prescribed for sleep apnea  1/30/24 [facility] ED not ordered by me TSH 0.4-- Diagnosis SIRS  2/20/24 TSh 0.33, free T4 1.59 on 125; reduce to 112 mcg/day  5/9/24 TSH 1.32, free T4 1.52-   10/25/24 25OH D40  11/26/24 1100  TSH 2.4, cortisol 3.5-- circumstances unknown   11/27/24 N1 130, K 5, urine Na 34, urine osm 327  11/28/24 creatinine 1.74, eGFR 44, Na 132, K 4.6, Ca 9.7, Mg 2, phos 3.5, glucose 99    Relevant imaging is as follows: (as read by me as it pertains to endocrine relevant organs)  4/26/1996 123I thyroid uptake/scan: 24 hour uptake 53%; diffuse; small pyramidal lobe.   4/29/1996 16 mCi 131I   9/6/22 DXA lowest t-score -0.3 right femoral neck     ROS  Feeling OK  A little tired  Sleep at night OK   Weight up until 3 months - 260#;   Appetite good   Cardiac: negative  Respiratory :  gets pretty tired with exertion .      GI: negative; on senna   Strength weaker   No falling     Past Medical History:   Diagnosis Date     ALL (acute lymphocytic leukemia) (H)      CKD (chronic kidney disease)      GVHD (graft versus host disease) (H)      H/O peripheral stem cell transplant (H)      Hyperthyroidism 1996     Infection due to 2019 novel coronavirus 01/16/2023     Infection due to 2019 novel coronavirus 05/18/2022     Influenza A 11/2022     Postablative hypothyroidism 1997     Pulmonary embolism (H)      Renal cell carcinoma (H) 2007    right kidney     Sleep apnea      Past Surgical History:   Procedure Laterality Date     APPENDECTOMY       BACK SURGERY  2017     BONE MARROW BIOPSY, BONE SPECIMEN, NEEDLE/TROCAR Left 09/02/2021    Procedure: BIOPSY, BONE MARROW;  Surgeon: Jailyn Ny APRN CNP;  Location: [facility] OR     BONE MARROW BIOPSY, BONE SPECIMEN, NEEDLE/TROCAR Left 11/15/2021    Procedure: BIOPSY, BONE MARROW;  Surgeon: Socrates De La Torre;  Location: [facility] OR     BONE MARROW BIOPSY, BONE SPECIMEN, NEEDLE/TROCAR Left 02/07/2022    Procedure: BIOPSY, BONE MARROW;  Surgeon: Renetta Wiggins  MIREYA;  Location: [facility] OR     BONE MARROW BIOPSY, BONE SPECIMEN, NEEDLE/TROCAR Left 08/18/2022    Procedure: BIOPSY, BONE MARROW;  Surgeon: Lorrie Yap PA-C;  Location: [facility] OR     BONE MARROW BIOPSY, BONE SPECIMEN, NEEDLE/TROCAR Left 08/07/2023    Procedure: Bone marrow biopsy, bone specimen, needle/trocar;  Surgeon: Teressa Rick PA-C;  Location: [facility] OR     BRONCHOSCOPY (RIGID OR FLEXIBLE), DIAGNOSTIC N/A 04/29/2022    Procedure: BRONCHOSCOPY, WITH BRONCHOALVEOLAR LAVAGE;  Surgeon: Murtaza Garcia MD;  Location: [facility]     BRONCHOSCOPY (RIGID OR FLEXIBLE), DIAGNOSTIC N/A 11/1/2024    Procedure: BRONCHOSCOPY, WITH BRONCHOALVEOLAR LAVAGE;  Surgeon: Murtaza Garcia MD;  Location: [facility]     IR CVC TUNNEL PLACEMENT > 5 YRS OF AGE  08/04/2021     IR CVC TUNNEL REMOVAL LEFT  09/02/2021     IR LIVER BIOPSY PERCUTANEOUS  02/21/2022     IR LUMBAR PUNCTURE  1/21/2021     NEPHRECTOMY  2007     ORTHOPEDIC SURGERY      bilateral shoulder surgeries     PERCUTANEOUS BIOPSY LIVER N/A 02/21/2022    Procedure: NEEDLE BIOPSY, LIVER, PERCUTANEOUS;  Surgeon: Trace Castellanos MD;  Location: [facility] OR     PHACOEMULSIFICATION WITH STANDARD INTRAOCULAR LENS IMPLANT Left 1/15/2024    Procedure: LEFT EYE PHACOEMULSIFICATION, CATARACT, WITH STANDARD INTRAOCULAR LENS IMPLANT INSERTION;  Surgeon: Deshawn Menezes MD;  Location: [facility] OR     PHACOEMULSIFICATION WITH STANDARD INTRAOCULAR LENS IMPLANT Right 2/5/2024    Procedure: RIGHT EYE PHACOEMULSIFICATION, CATARACT, WITH STANDARD INTRAOCULAR LENS IMPLANT INSERTION;  Surgeon: Deshawn Menezes MD;  Location: [facility] OR     Current Outpatient Medications   Medication Sig Dispense Refill     levothyroxine (SYNTHROID/LEVOTHROID) 112 MCG tablet Take 1 tablet (112 mcg) by mouth daily. 90 tablet 4     acyclovir (ZOVIRAX) 400 MG tablet Take 1 tablet (400 mg) by mouth every 12 hours.       atovaquone (MEPRON) 750 MG/5ML suspension Take 5 mLs (750 mg) by mouth 2 times daily for 13  days. 130 mL 0     Carboxymethylcell-Glycerin PF (REFRESH RELIEVA PF) 0.5-1 % SOLN Apply 1 drop to eye 4 times daily Preservative free. Either PF multidose bottle or PF vials 30 each 11     dexAMETHasone alcohol-free (DECADRON) 0.1 MG/ML solution Swish and spit 10 mLs (1 mg) in mouth 2 times daily. 500 mL 2     escitalopram (LEXAPRO) 10 MG tablet Take 1 tablet (10 mg) by mouth daily 30 tablet 3     fluorometholone (FML LIQUIFILM) 0.1 % ophthalmic suspension Place 1 drop Into the left eye 3 times daily. 5 mL 1     LORazepam (ATIVAN) 1 MG tablet Take 1 mg by mouth every 6 hours as needed for anxiety. PRN       metroNIDAZOLE (METROCREAM) 0.75 % external cream Apply topically 2 times daily Apply to the nose. 45 g 3     nicotine polacrilex (NICORETTE) 4 MG gum Place 4 mg inside cheek daily.       Nutritional Supplements (ENSURE COMPLETE SHAKE) LIQD Take 11 mLs by mouth every morning       omeprazole (PRILOSEC) 40 MG DR capsule Take 1 capsule (40 mg) by mouth daily 30 capsule 3     penicillin V (VEETID) 250 MG tablet Take 1 tablet (250 mg) by mouth 2 times daily. 60 tablet 1     predniSONE (DELTASONE) 10 MG tablet Take 1 tablet (10 mg) by mouth daily 90 tablet 2     predniSONE (DELTASONE) 5 MG tablet Take 1 tablet (5 mg) by mouth daily. Currently taking 15 mg daily ( on taper) 90 tablet 1     rosuvastatin (CRESTOR) 5 MG tablet Take 1 tablet (5 mg) by mouth daily. 30 tablet 3     sennosides (SENOKOT) 8.6 MG tablet Take 1 tablet by mouth 3 times daily as needed for constipation 90 tablet 0     sodium chloride 0.9 % neb solution 3 mLs by Other route 2 times daily 300 mL 11     tacrolimus (PROTOPIC) 0.1 % external ointment Apply topically 2 times daily 30 g 1     Family History   Problem Relation Age of Onset     Lung Cancer Mother      Depression Mother      Polycythemia Father      Anesthesia Reaction Father         slow to awaken     Coronary Artery Disease Maternal Grandmother      Diabetes Maternal Grandmother       "Hypertension Maternal Grandmother      Hypothyroidism Sister      Glaucoma No family hx of      Macular Degeneration No family hx of      Venous thrombosis No family hx of      Melanoma No family hx of      Skin Cancer No family hx of      GENERAL: no distress; I can see from mid trunk up; round face ; wife at his side  BP Readings from Last 1 Encounters:   11/28/24 134/79      Pulse Readings from Last 1 Encounters:   11/28/24 89      Resp Readings from Last 1 Encounters:   11/28/24 16      Temp Readings from Last 1 Encounters:   11/28/24 97.4  F (36.3  C) (Oral)      SpO2 Readings from Last 1 Encounters:   11/28/24 97%      Wt Readings from Last 1 Encounters:   11/28/24 117.9 kg (260 lb)      Ht Readings from Last 1 Encounters:   11/25/24 1.83 m (6' 0.05\")     SKIN: Visible skin clear.   EYES: Eyes grossly normal to inspection.    NECK: no visible masses; no grossly visible goiter  RESP: No audible wheeze, cough, or increased work of breathing.    NEURO: Awake, alert, responds appropriately to questions.  Mentation and speech fluent.  PSYCH:affect normal,and appearance well-groomed.     Latest Ref Rng 11/27/2023  7:53 AM 2/20/2024  3:07 PM 5/9/2024  2:04 PM 11/26/2024  11:00 AM   ENDO THYROID LABS-[facility]        TSH 0.30 - 4.20 uIU/mL 0.89  0.33  1.32  2.40    Free T3 2.0 - 4.4 pg/mL       Triiodothyronine (T3) 85 - 202 ng/dL 88       FREE T4 0.90 - 1.70 ng/dL 1.37  1.59  1.52          Latest Reference Range & Units 06/13/23 14:24 06/20/23 07:23 11/27/23 07:53 11/26/24 11:00   Cortisol Serum ug/dL 1.4 0.5 1.4 3.5         Again, thank you for allowing me to participate in the care of your patient.      Sincerely,    Natalia Gray MD    "

## 2024-12-09 NOTE — TELEPHONE ENCOUNTER
"Patient confirmed scheduled appointment:  Date: 12/09/24  Time: 3:40  Visit type: RETURN THYROID CANCER  Provider: Natalia Gray MD  Location: [facility]  Testing/imaging: N/A  Additional notes: Scheduled per pt, will be in [place] for visit.      Te from Luciana Abdullahi:  \"Pt had an appt with  on 11/26, he was in the ED and could not make the appt. The next available appt from here isnt till November of 2025. Is Pt able to be seen sooner? Please advise.\"     Gabi Hair on 12/9/2024 at 10:38 AM    "

## 2024-12-09 NOTE — PROGRESS NOTES
Endocrine Video visit-     Attending Assessment/Plan :     Postablative hypothyroidism -   On LT4 112 mcg/day with normal TSH.     History of Graves' hyperthyroidism s/p 131I treatment 1996.  He does not have LUZ MARIA    On corticosteroid .  This is a risk for the bone and for suppression of the HPA.     Discussed.    The steroid should be managed based on the GVHD (oncology), not by endocrine.  If steroid gets close to physiological range (7 mg or less)  we can test and take over then.   Repeat DXA now and low threshold to treat prn     History of BMT with GVHD - as above .  The GVHD is determining the prednisone dose at present.  This is not an endocrine condition.      The Longitudinal plan of care for post ablative hypothyroidism, risks of being on corticosteroid was addressed during this visit. Due to added complexity of care, we will continue to support Nik , and the subsequent management of this condition(s) and with the ongoing continuity of care of this condition.    Due to the continued risks of COVID 19 transmission and to improve ease of access this visit was a video visit. The patient gave verbal consent for the visit today.    I have independently reviewed and interpreted labs, imaging as indicated.     Distant Location (provider location):  Off-site  Mode of Communication:  Video Conference via [employer]  Chart review/prep time 1  0715-1058; [id number]  Visit Start time  1554   Visit Stop time  1609   _34_ minutes spent on the date of the encounter doing chart review, history and exam, documentation and further activities as noted above.     Natalia Gray MD    Chief complaint/ HPI: Nik returns for follow up of postablative hypothyroidism, history of graves disease, history of corticosteroid causing low cortisol.  I have seen him once before 3/2023. At that time he was on lT4 150 mcg/day.  He is now on LT4 112 mcg/day since 2/22/24 .  He same day cancelled our recent appt due to hospitalization  11/25-11/28/24 with acute on chronic kidney disease with high K due to bactrim which he was getting for PCP pneumonia.      In 1996 he was treated with 16 mCi 131I for hyperthyroidism.  We do not have thyroid lab values prior to 2003.  Since 11/18/2021 the lT4 dose has been progressively reduced from 200 to the current dose of 112 mc/day.  He has been on the current dose of  112 mcg/day since 2/2024 .  Recent TSh was normal on this dose.     BMT 8/10/2021 included intermittent steroid treatments. Continuous steroid started after he was diagnosed with GVHD involving eye, skin, mouth, liver.  He started on high dose prednisone 100 mg 3/1/22.  At our last appt he was prednisone 4 mg/day  for one week, a reduction.  He is currently on prednisone 15 mg/day.  He has been on this dose since fall,2024 an increase due to mouth GVHD.      He did well on 15/10 all summer.  The dose was tapered to 10 mg/day and then he had flare of GVHD.  He is now back on Prednisone 15 mg/day.      Selected Endocrine relevant labs are as follows:  See my 3/1/23 for labs 2003 to 2023  3/3/2023 0748 cortisol 2 -- strong suggestion of HPA suppression - he should be on minimum dose prednisone 5 mg/day or equivalent.   5/2/23 1134 AM.  TSH 0.15, free T4 1.55, T3   6/20/23 0723 cortisol 0.5   8/15/23 TSH 0.16, free T4 1.71, Ca 10, creatinine 1.18,   11/14/23 TSH 0.08,f ree T4 1.83  --on LT4 137,   Reduce to LT4 125 mcg/day  11/2723 TSH 0.89, free T4 1.37, t3 88  1/29/24 0935 AM not ordered by me , unknown circumstances ([place], Dr Chris Ledbetter): testosterone 134, PSA 0.83-- note reports ED, fatigue not able to tolerate CPAP mask prescribed for sleep apnea  1/30/24 [place] ED not ordered by me TSH 0.4-- Diagnosis SIRS  2/20/24 TSh 0.33, free T4 1.59 on 125; reduce to 112 mcg/day  5/9/24 TSH 1.32, free T4 1.52-   10/25/24 25OH D40  11/26/24 1100  TSH 2.4, cortisol 3.5-- circumstances unknown   11/27/24 N1 130, K 5, urine Na 34, urine osm 327  11/28/24  creatinine 1.74, eGFR 44, Na 132, K 4.6, Ca 9.7, Mg 2, phos 3.5, glucose 99    Relevant imaging is as follows: (as read by me as it pertains to endocrine relevant organs)  4/26/1996 123I thyroid uptake/scan: 24 hour uptake 53%; diffuse; small pyramidal lobe.   4/29/1996 16 mCi 131I   9/6/22 DXA lowest t-score -0.3 right femoral neck     ROS  Feeling OK  A little tired  Sleep at night OK   Weight up until 3 months - 260#;   Appetite good   Cardiac: negative  Respiratory :  gets pretty tired with exertion .      GI: negative; on senna   Strength weaker   No falling     Past Medical History:   Diagnosis Date    ALL (acute lymphocytic leukemia) (H)     CKD (chronic kidney disease)     GVHD (graft versus host disease) (H)     H/O peripheral stem cell transplant (H)     Hyperthyroidism 1996    Infection due to 2019 novel coronavirus 01/16/2023    Infection due to 2019 novel coronavirus 05/18/2022    Influenza A 11/2022    Postablative hypothyroidism 1997    Pulmonary embolism (H)     Renal cell carcinoma (H) 2007    right kidney    Sleep apnea      Past Surgical History:   Procedure Laterality Date    APPENDECTOMY      BACK SURGERY  2017    BONE MARROW BIOPSY, BONE SPECIMEN, NEEDLE/TROCAR Left 09/02/2021    Procedure: BIOPSY, BONE MARROW;  Surgeon: Jailyn Ny APRN CNP;  Location: [facility]    BONE MARROW BIOPSY, BONE SPECIMEN, NEEDLE/TROCAR Left 11/15/2021    Procedure: BIOPSY, BONE MARROW;  Surgeon: Socrates De La Torre;  Location: [facility]    BONE MARROW BIOPSY, BONE SPECIMEN, NEEDLE/TROCAR Left 02/07/2022    Procedure: BIOPSY, BONE MARROW;  Surgeon: Renetta Wiggins PA-C;  Location: [facility]    BONE MARROW BIOPSY, BONE SPECIMEN, NEEDLE/TROCAR Left 08/18/2022    Procedure: BIOPSY, BONE MARROW;  Surgeon: Lorrie Yap PA-C;  Location: [facility]    BONE MARROW BIOPSY, BONE SPECIMEN, NEEDLE/TROCAR Left 08/07/2023    Procedure: Bone marrow biopsy, bone specimen, needle/trocar;  Surgeon: Teressa Rick  MIREYA;  Location: [facility]    BRONCHOSCOPY (RIGID OR FLEXIBLE), DIAGNOSTIC N/A 04/29/2022    Procedure: BRONCHOSCOPY, WITH BRONCHOALVEOLAR LAVAGE;  Surgeon: Murtaza Garcia MD;  Location: [facility]    BRONCHOSCOPY (RIGID OR FLEXIBLE), DIAGNOSTIC N/A 11/1/2024    Procedure: BRONCHOSCOPY, WITH BRONCHOALVEOLAR LAVAGE;  Surgeon: Murtaza Garcia MD;  Location: [facility]    IR CVC TUNNEL PLACEMENT > 5 YRS OF AGE  08/04/2021    IR CVC TUNNEL REMOVAL LEFT  09/02/2021    IR LIVER BIOPSY PERCUTANEOUS  02/21/2022    IR LUMBAR PUNCTURE  1/21/2021    NEPHRECTOMY  2007    ORTHOPEDIC SURGERY      bilateral shoulder surgeries    PERCUTANEOUS BIOPSY LIVER N/A 02/21/2022    Procedure: NEEDLE BIOPSY, LIVER, PERCUTANEOUS;  Surgeon: Trace Castellanos MD;  Location: [facility]    PHACOEMULSIFICATION WITH STANDARD INTRAOCULAR LENS IMPLANT Left 1/15/2024    Procedure: LEFT EYE PHACOEMULSIFICATION, CATARACT, WITH STANDARD INTRAOCULAR LENS IMPLANT INSERTION;  Surgeon: Deshawn Menezes MD;  Location: [facility]    PHACOEMULSIFICATION WITH STANDARD INTRAOCULAR LENS IMPLANT Right 2/5/2024    Procedure: RIGHT EYE PHACOEMULSIFICATION, CATARACT, WITH STANDARD INTRAOCULAR LENS IMPLANT INSERTION;  Surgeon: Deshawn Menezes MD;  Location: [facility]     Current Outpatient Medications   Medication Sig Dispense Refill    levothyroxine (SYNTHROID/LEVOTHROID) 112 MCG tablet Take 1 tablet (112 mcg) by mouth daily. 90 tablet 4    acyclovir (ZOVIRAX) 400 MG tablet Take 1 tablet (400 mg) by mouth every 12 hours.      atovaquone (MEPRON) 750 MG/5ML suspension Take 5 mLs (750 mg) by mouth 2 times daily for 13 days. 130 mL 0    Carboxymethylcell-Glycerin PF (REFRESH RELIEVA PF) 0.5-1 % SOLN Apply 1 drop to eye 4 times daily Preservative free. Either PF multidose bottle or PF vials 30 each 11    dexAMETHasone alcohol-free (DECADRON) 0.1 MG/ML solution Swish and spit 10 mLs (1 mg) in mouth 2 times daily. 500 mL 2    escitalopram (LEXAPRO) 10 MG tablet Take 1 tablet  (10 mg) by mouth daily 30 tablet 3    fluorometholone (FML LIQUIFILM) 0.1 % ophthalmic suspension Place 1 drop Into the left eye 3 times daily. 5 mL 1    LORazepam (ATIVAN) 1 MG tablet Take 1 mg by mouth every 6 hours as needed for anxiety. PRN      metroNIDAZOLE (METROCREAM) 0.75 % external cream Apply topically 2 times daily Apply to the nose. 45 g 3    nicotine polacrilex (NICORETTE) 4 MG gum Place 4 mg inside cheek daily.      Nutritional Supplements (ENSURE COMPLETE SHAKE) LIQD Take 11 mLs by mouth every morning      omeprazole (PRILOSEC) 40 MG DR capsule Take 1 capsule (40 mg) by mouth daily 30 capsule 3    penicillin V (VEETID) 250 MG tablet Take 1 tablet (250 mg) by mouth 2 times daily. 60 tablet 1    predniSONE (DELTASONE) 10 MG tablet Take 1 tablet (10 mg) by mouth daily 90 tablet 2    predniSONE (DELTASONE) 5 MG tablet Take 1 tablet (5 mg) by mouth daily. Currently taking 15 mg daily ( on taper) 90 tablet 1    rosuvastatin (CRESTOR) 5 MG tablet Take 1 tablet (5 mg) by mouth daily. 30 tablet 3    sennosides (SENOKOT) 8.6 MG tablet Take 1 tablet by mouth 3 times daily as needed for constipation 90 tablet 0    sodium chloride 0.9 % neb solution 3 mLs by Other route 2 times daily 300 mL 11    tacrolimus (PROTOPIC) 0.1 % external ointment Apply topically 2 times daily 30 g 1     Family History   Problem Relation Age of Onset    Lung Cancer Mother     Depression Mother     Polycythemia Father     Anesthesia Reaction Father         slow to awaken    Coronary Artery Disease Maternal Grandmother     Diabetes Maternal Grandmother     Hypertension Maternal Grandmother     Hypothyroidism Sister     Glaucoma No family hx of     Macular Degeneration No family hx of     Venous thrombosis No family hx of     Melanoma No family hx of     Skin Cancer No family hx of      GENERAL: no distress; I can see from mid trunk up; round face ; wife at his side  BP Readings from Last 1 Encounters:   11/28/24 134/79      Pulse  "Readings from Last 1 Encounters:   11/28/24 89      Resp Readings from Last 1 Encounters:   11/28/24 16      Temp Readings from Last 1 Encounters:   11/28/24 97.4  F (36.3  C) (Oral)      SpO2 Readings from Last 1 Encounters:   11/28/24 97%      Wt Readings from Last 1 Encounters:   11/28/24 117.9 kg (260 lb)      Ht Readings from Last 1 Encounters:   11/25/24 1.83 m (6' 0.05\")     SKIN: Visible skin clear.   EYES: Eyes grossly normal to inspection.    NECK: no visible masses; no grossly visible goiter  RESP: No audible wheeze, cough, or increased work of breathing.    NEURO: Awake, alert, responds appropriately to questions.  Mentation and speech fluent.  PSYCH:affect normal,and appearance well-groomed.     Latest Ref Rng 11/27/2023  7:53 AM 2/20/2024  3:07 PM 5/9/2024  2:04 PM 11/26/2024  11:00 AM   ENDO THYROID LABS-[facility]        TSH 0.30 - 4.20 uIU/mL 0.89  0.33  1.32  2.40    Free T3 2.0 - 4.4 pg/mL       Triiodothyronine (T3) 85 - 202 ng/dL 88       FREE T4 0.90 - 1.70 ng/dL 1.37  1.59  1.52          Latest Reference Range & Units 06/13/23 14:24 06/20/23 07:23 11/27/23 07:53 11/26/24 11:00   Cortisol Serum ug/dL 1.4 0.5 1.4 3.5     "

## 2024-12-10 ENCOUNTER — OFFICE VISIT (OUTPATIENT)
Dept: INFECTIOUS DISEASES | Facility: CLINIC | Age: 64
End: 2024-12-10
Attending: INTERNAL MEDICINE
Payer: MEDICARE

## 2024-12-10 ENCOUNTER — OFFICE VISIT (OUTPATIENT)
Dept: PULMONOLOGY | Facility: CLINIC | Age: 64
End: 2024-12-10
Attending: INTERNAL MEDICINE
Payer: MEDICARE

## 2024-12-10 VITALS
DIASTOLIC BLOOD PRESSURE: 81 MMHG | SYSTOLIC BLOOD PRESSURE: 143 MMHG | HEART RATE: 75 BPM | WEIGHT: 269.6 LBS | OXYGEN SATURATION: 96 % | BODY MASS INDEX: 36.52 KG/M2

## 2024-12-10 VITALS
HEART RATE: 75 BPM | WEIGHT: 266.56 LBS | BODY MASS INDEX: 36.11 KG/M2 | SYSTOLIC BLOOD PRESSURE: 143 MMHG | DIASTOLIC BLOOD PRESSURE: 81 MMHG | OXYGEN SATURATION: 98 %

## 2024-12-10 DIAGNOSIS — B59 PNEUMONIA OF BOTH UPPER LOBES DUE TO PNEUMOCYSTIS JIROVECII (H): Primary | ICD-10-CM

## 2024-12-10 DIAGNOSIS — D89.813 GVHD AS COMPLICATION OF BONE MARROW TRANSPLANT (H): Primary | ICD-10-CM

## 2024-12-10 DIAGNOSIS — T86.09 GVHD AS COMPLICATION OF BONE MARROW TRANSPLANT (H): ICD-10-CM

## 2024-12-10 DIAGNOSIS — Z94.81 S/P BONE MARROW TRANSPLANT (H): Primary | ICD-10-CM

## 2024-12-10 DIAGNOSIS — B59 PNEUMONIA OF BOTH UPPER LOBES DUE TO PNEUMOCYSTIS JIROVECII (H): ICD-10-CM

## 2024-12-10 DIAGNOSIS — T86.09 GVHD AS COMPLICATION OF BONE MARROW TRANSPLANT (H): Primary | ICD-10-CM

## 2024-12-10 DIAGNOSIS — D89.813 GVHD AS COMPLICATION OF BONE MARROW TRANSPLANT (H): ICD-10-CM

## 2024-12-10 DIAGNOSIS — N18.2 CKD (CHRONIC KIDNEY DISEASE) STAGE 2, GFR 60-89 ML/MIN: ICD-10-CM

## 2024-12-10 PROBLEM — E66.01 CLASS 2 SEVERE OBESITY DUE TO EXCESS CALORIES WITH SERIOUS COMORBIDITY IN ADULT (H): Status: ACTIVE | Noted: 2024-12-10

## 2024-12-10 PROBLEM — E66.812 CLASS 2 SEVERE OBESITY DUE TO EXCESS CALORIES WITH SERIOUS COMORBIDITY IN ADULT (H): Status: ACTIVE | Noted: 2024-12-10

## 2024-12-10 PROCEDURE — G0463 HOSPITAL OUTPT CLINIC VISIT: HCPCS | Performed by: INTERNAL MEDICINE

## 2024-12-10 ASSESSMENT — PAIN SCALES - GENERAL
PAINLEVEL_OUTOF10: NO PAIN (0)
PAINLEVEL_OUTOF10: NO PAIN (0)

## 2024-12-10 NOTE — PROGRESS NOTES
Reason for Visit  Nik Nava is a 64 year old year old male who is being seen for Follow Up (Return Pulmonary -1 mo)    Pulmonary HPI  63 YO with ALL s/p PBSCT in 8-21 (Day + 261) referred to the Pulmonary clinic by Dr. Hill for an evaluation of an abnormal chest CT.  Denies previous history of pulmonary disease. At present no cough, SOB or sputum production.  No fevers or chills.  Diagnosed with cGvHD of eyes, skin, GI tract and liver.  Was placed on high dose steroids (3-1-22) at 100 mg per day; since that time has been tapering down dose, currently on 50 mg every other day. Denies known mold exposure but did work in the past in a furniture delivery business, worked in a warehouse.  Not currently working.  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  Denies mold exposure at home.  Has cabin but has not been there this Winter.  No tobacco x 3 years, smoked since age 17, few packs per day.  FH- lung cancer in mother.    Last seen 6 weeks ago.  After that visit he underwent a bronchoscopy.  Bronchoscopy cultures were positive for PJP, was started on Bactrim and then Atovaquone, also treated with steroids.  Was on antifungal agent initially but this was discontinue after PJP was identified.  Since his last visit his breathing is improved- not feeling SOB with climbing stairs.  No cough or fevers.  Was admitted with NEGRA  with hyperkalemia thought to be secondary to bactrim.  CT chest from 12-9 was personally reviewed in clinic- improved but still present GGO opacities predominately in the upper lobes/     The patient was seen and examined by Murtaza Garcia MD           Current Outpatient Medications   Medication Sig Dispense Refill    acyclovir (ZOVIRAX) 400 MG tablet Take 1 tablet (400 mg) by mouth every 12 hours.      Carboxymethylcell-Glycerin PF (REFRESH RELIEVA PF) 0.5-1 % SOLN Apply 1 drop to eye 4 times daily Preservative free. Either PF multidose bottle or PF vials 30 each 11    escitalopram (LEXAPRO) 10 MG  tablet Take 1 tablet (10 mg) by mouth daily 30 tablet 3    levothyroxine (SYNTHROID/LEVOTHROID) 112 MCG tablet Take 1 tablet (112 mcg) by mouth daily. 90 tablet 4    LORazepam (ATIVAN) 1 MG tablet Take 1 mg by mouth every 6 hours as needed for anxiety. PRN      metroNIDAZOLE (METROCREAM) 0.75 % external cream Apply topically 2 times daily Apply to the nose. 45 g 3    nicotine polacrilex (NICORETTE) 4 MG gum Place 4 mg inside cheek daily.      Nutritional Supplements (ENSURE COMPLETE SHAKE) LIQD Take 11 mLs by mouth every morning      omeprazole (PRILOSEC) 40 MG DR capsule Take 1 capsule (40 mg) by mouth daily 30 capsule 3    penicillin V (VEETID) 250 MG tablet Take 1 tablet (250 mg) by mouth 2 times daily. 60 tablet 1    predniSONE (DELTASONE) 10 MG tablet Take 1 tablet (10 mg) by mouth daily 90 tablet 2    predniSONE (DELTASONE) 5 MG tablet Take 1 tablet (5 mg) by mouth daily. Currently taking 15 mg daily ( on taper) 90 tablet 1    rosuvastatin (CRESTOR) 5 MG tablet Take 1 tablet (5 mg) by mouth daily. 30 tablet 3    sennosides (SENOKOT) 8.6 MG tablet Take 1 tablet by mouth 3 times daily as needed for constipation 90 tablet 0    sodium chloride 0.9 % neb solution 3 mLs by Other route 2 times daily 300 mL 11    tacrolimus (PROTOPIC) 0.1 % external ointment Apply topically 2 times daily 30 g 1    atovaquone (MEPRON) 750 MG/5ML suspension Take 5 mLs (750 mg) by mouth 2 times daily for 13 days. (Patient not taking: Reported on 12/10/2024) 130 mL 0    dexAMETHasone alcohol-free (DECADRON) 0.1 MG/ML solution Swish and spit 10 mLs (1 mg) in mouth 2 times daily. (Patient not taking: Reported on 12/10/2024) 500 mL 2    fluorometholone (FML LIQUIFILM) 0.1 % ophthalmic suspension Place 1 drop Into the left eye 3 times daily. (Patient not taking: Reported on 12/10/2024) 5 mL 1     No current facility-administered medications for this visit.     No Known Allergies  Past Medical History:   Diagnosis Date    ALL (acute  lymphocytic leukemia) (H)     CKD (chronic kidney disease)     GVHD (graft versus host disease) (H)     H/O peripheral stem cell transplant (H)     Hyperthyroidism 1996    Infection due to 2019 novel coronavirus 01/16/2023    Infection due to 2019 novel coronavirus 05/18/2022    Influenza A 11/2022    Postablative hypothyroidism 1997    Pulmonary embolism (H)     Renal cell carcinoma (H) 2007    right kidney    Sleep apnea        Past Surgical History:   Procedure Laterality Date    APPENDECTOMY      BACK SURGERY  2017    BONE MARROW BIOPSY, BONE SPECIMEN, NEEDLE/TROCAR Left 09/02/2021    Procedure: BIOPSY, BONE MARROW;  Surgeon: Jailyn Ny APRN CNP;  Location: [facility]    BONE MARROW BIOPSY, BONE SPECIMEN, NEEDLE/TROCAR Left 11/15/2021    Procedure: BIOPSY, BONE MARROW;  Surgeon: Socrates De La Torre;  Location: [facility]    BONE MARROW BIOPSY, BONE SPECIMEN, NEEDLE/TROCAR Left 02/07/2022    Procedure: BIOPSY, BONE MARROW;  Surgeon: Renetta Wiggins PA-C;  Location: [facility]    BONE MARROW BIOPSY, BONE SPECIMEN, NEEDLE/TROCAR Left 08/18/2022    Procedure: BIOPSY, BONE MARROW;  Surgeon: Lorrie Yap PA-C;  Location: [facility]    BONE MARROW BIOPSY, BONE SPECIMEN, NEEDLE/TROCAR Left 08/07/2023    Procedure: Bone marrow biopsy, bone specimen, needle/trocar;  Surgeon: Teressa Rick PA-C;  Location: [facility]    BRONCHOSCOPY (RIGID OR FLEXIBLE), DIAGNOSTIC N/A 04/29/2022    Procedure: BRONCHOSCOPY, WITH BRONCHOALVEOLAR LAVAGE;  Surgeon: Murtaza Garcia MD;  Location: [facility]    BRONCHOSCOPY (RIGID OR FLEXIBLE), DIAGNOSTIC N/A 11/1/2024    Procedure: BRONCHOSCOPY, WITH BRONCHOALVEOLAR LAVAGE;  Surgeon: Murtaza Garcia MD;  Location: [facility]    IR CVC TUNNEL PLACEMENT > 5 YRS OF AGE  08/04/2021    IR CVC TUNNEL REMOVAL LEFT  09/02/2021    IR LIVER BIOPSY PERCUTANEOUS  02/21/2022    IR LUMBAR PUNCTURE  1/21/2021    NEPHRECTOMY  2007    ORTHOPEDIC SURGERY      bilateral shoulder surgeries    PERCUTANEOUS  BIOPSY LIVER N/A 2022    Procedure: NEEDLE BIOPSY, LIVER, PERCUTANEOUS;  Surgeon: Trace Castellanos MD;  Location: [facility]    PHACOEMULSIFICATION WITH STANDARD INTRAOCULAR LENS IMPLANT Left 1/15/2024    Procedure: LEFT EYE PHACOEMULSIFICATION, CATARACT, WITH STANDARD INTRAOCULAR LENS IMPLANT INSERTION;  Surgeon: Deshawn Menezes MD;  Location: [facility]    PHACOEMULSIFICATION WITH STANDARD INTRAOCULAR LENS IMPLANT Right 2024    Procedure: RIGHT EYE PHACOEMULSIFICATION, CATARACT, WITH STANDARD INTRAOCULAR LENS IMPLANT INSERTION;  Surgeon: Deshawn Menezes MD;  Location: [facility]       Social History     Socioeconomic History    Marital status:      Spouse name: Not on file    Number of children: Not on file    Years of education: Not on file    Highest education level: Not on file   Occupational History    Not on file   Tobacco Use    Smoking status: Former     Current packs/day: 0.00     Average packs/day: 2.0 packs/day for 30.0 years (60.0 ttl pk-yrs)     Types: Cigarettes     Start date: 1989     Quit date: 2019     Years since quittin.6     Passive exposure: Past    Smokeless tobacco: Never    Tobacco comments:     DAILY USE OF NICORETTE GUM   Vaping Use    Vaping status: Never Used   Substance and Sexual Activity    Alcohol use: Not Currently    Drug use: Never    Sexual activity: Not on file   Other Topics Concern    Parent/sibling w/ CABG, MI or angioplasty before 65F 55M? Not Asked   Social History Narrative    Not on file     Social Drivers of Health     Financial Resource Strain: Low Risk  (2024)    Financial Resource Strain     Within the past 12 months, have you or your family members you live with been unable to get utilities (heat, electricity) when it was really needed?: No   Food Insecurity: Low Risk  (2024)    Food Insecurity     Within the past 12 months, did you worry that your food would run out before you got money to buy more?: No     Within the  past 12 months, did the food you bought just not last and you didn t have money to get more?: No   Transportation Needs: Low Risk  (11/27/2024)    Transportation Needs     Within the past 12 months, has lack of transportation kept you from medical appointments, getting your medicines, non-medical meetings or appointments, work, or from getting things that you need?: No   Physical Activity: Not on file   Stress: Not on file   Social Connections: Socially Integrated (5/30/2024)    Received from [facility]    Social Connections     Do you often feel lonely or isolated from those around you?: 0   Interpersonal Safety: Low Risk  (11/25/2024)    Interpersonal Safety     Do you feel physically and emotionally safe where you currently live?: Yes     Within the past 12 months, have you been hit, slapped, kicked or otherwise physically hurt by someone?: No     Within the past 12 months, have you been humiliated or emotionally abused in other ways by your partner or ex-partner?: No   Housing Stability: Low Risk  (11/27/2024)    Housing Stability     Do you have housing? : Yes     Are you worried about losing your housing?: No   Recent Concern: Housing Stability - High Risk (11/27/2024)    Housing Stability     Do you have housing? : No     Are you worried about losing your housing?: No       ROS Pulmonary  A complete ROS was otherwise negative except as noted in the HPI.  BP (!) 143/81 (BP Location: Right arm, Patient Position: Chair, Cuff Size: Adult Regular)   Pulse 75   Wt 122.3 kg (269 lb 9.6 oz)   SpO2 96%   BMI 36.52 kg/m    Exam:   GENERAL APPEARANCE: Well developed, well nourished, alert, and in no apparent distress.  EYES: PERRL, EOMI  HENT: Nasal mucosa with no edema and no hyperemia. No nasal polyps.  EARS: Canals clear, TMs normal  MOUTH: Oral mucosa is moist, without any lesions, no tonsillar enlargement, no oropharyngeal exudate.  NECK: supple, no masses, no  thyromegaly.  LYMPHATICS: No significant axillary, cervical, or supraclavicular nodes.  RESP:  Good air flow throughout.  No crackles. No rhonchi. No wheezes.  CV: Normal S1, S2, regular rhythm, normal rate. No murmur.  No rub. No gallop. No LE edema.   ABDOMEN:  Bowel sounds normal, soft, nontender, no HSM or masses.   MS: extremities normal. No clubbing. No cyanosis.  SKIN: no rash on limited exam  NEURO: Mentation intact, speech normal, normal strength and tone, normal gait and stance  PSYCH: mentation appears normal. and affect normal/bright  Results:  No results found for this or any previous visit (from the past week).    Assessment and plan: ***  63 YO with ALL s/p BMT complicated by cGvHD requiring high dose steroids.  Now with nodular infiltrate on CT concerning for an atypical infection (Fungal, Nocardia, or atypical Mycobacteria).  Needed further invasive testing to attempt to identify a pathogen.  Follow-up CT when last seen showed mixed results- some areas are better, some worse.  Past COVID19 with findings on CT potentially secondary to COVID versus fungal infection.  Now seen back in clinic with CT showing recurrence of GGO.  Taken together with fevers, concerns for infection including atypical infection.  Needs further invasive testing to assist in identifying a pathogen.  Diagnosed with PJP pneumonia after his last visit- completed course of antibiotics with resolution of fevers and SOB.  Still with GGO but symptoms are all resolved; likely delayed clearance of infectious changes although need to consider ILD after infection (no scarring seen on CT).      RTC in 3 months with repeat chest CT.  Patient advised to contact the clinic with any questions or concerns prior to his next clinic visit

## 2024-12-10 NOTE — NURSING NOTE
Chief Complaint   Patient presents with    Follow Up     Return Pulmonary -1 mo       Vitals were taken, medications reconciled.    Elba Irby, Clinic Assistant   2:19 PM

## 2024-12-10 NOTE — LETTER
12/10/2024       RE: Nik Nava  [address] [facility] [address]     Dear Colleague,    Thank you for referring your patient, Nik Nava, to the [facility] [place] at [facility]. Please see a copy of my visit note below.      [facility] [place]  [address] [place] [address]  Phone: [phone]  Fax: [phone]    Patient:  Nik Nava, Date of birth 1960  Date of Visit:  12/10/2024  Referring Provider Alba Markham MD  Reason for visit: fever of unknown origin    Recommendations   PJP Pneumonia  Completed last day of atovaquone 750 mg BID yesterday  Start PJP prophylaxis with TMP/SMX DS M/W/F this week. Should be continued indefinitely while on 15 mg/day of steroids.   I would not consider taking him off of PPx until he is consistently on <10 mg/day of prednisone.   Repeat BMP in the next 10-14 days after starting TMP/SMX to ensure this remains stable     Functional Asplenia  Continue PCN V for PPx  Up-to-date on pneumococcal and HIB vaccines   Currently getting meningitis series. He will schedule a nurse visit in ~8 weeks to finish his meningitis shots.   Menveo (MenACWY): Dose 1 on 11/28. Needs Dose 2 in ~8 weeks. Should get boosters every 5 years  Bexsero (MenB): Dose 1 on 11/28. Needs Dose 2 >4 weeks. Should get boosters at 1 year and every 2-3 years thereafter    Other  Continue Valtrex PPx  Will need COVID-19 booster in late March/early April     Follow-up in 3 months after repeat CT chest.     I spent 48 minutes reviewing the patient, reviewing the medical record, and interpreting patient findings, and coordinating care     The longitudinal plan of care for the PJP Pneumonia, functional asplenia, and GVHD as documented were addressed during this visit. Due to the added complexity in care, I will continue to support Nik in the  subsequent management and with ongoing continuity of care.    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 He is is currently on 15 mg/day prednisone and dex swish and swallow. We are seeing him in clinic today for unexplained fevers and pulmonary GGOs.     # PJP pneumonia   # Chronic steroid use (15 mg/kg/day)   Recently experienced unexplained fevers from 10/24-11/5 with CT chest (10/21) with peribronchovascular GGOs. BAL from 11/1 positive for PJP. Started on TMP/SMX at 15 mg/kg/day on 11/18/24. Developed nausea, vomiting, and hyperkalemia on 11/25/24. Transitioned to atovaquone on 11/25. Will plan for 21 day course of treatment (Dates: 11/18-12/10). He can transition to TMP/SMX prophylaxis indefinitely thereafter.     #Functional asplenia  On PCN V PPx  Currently receiving Meningitis vaccine series   Up-to-date on PCV-20 and HIB    Transplant Checklist  - QTc interval: 446 (11/25/24)  - Pneumocystis prophylaxis: TMP/SMX MWF. Previously on pentamidine, but stopped in 6/2023 when CD4>200. PJP PCR from Bal positive on 11/1/2024. Started on TMP/SMX at 15 mg/kg/day on 11/18/24. Transitioned to atovaquone on 11/25/24 due to hyperkalemia. Transitioned to TMP/SMX DS on 12/11/2024.   - Bacterial prophylaxis: Penicillin for asplenia   - Fungal prophylaxis: None.   - Serostatus & viral prophylaxis: CMV D+/R+, HSV ?, EBV + Acyclovir. Developed recurrent oral HSV after stopping acyclovir.   - Immunization status:  Up-to-date on seasonal COVID-19 and influenza, Shingrix, RSV (8/2024), PCV-20 (5/2024), HBV, and Tetanus (5/2024).  Currently receiving MenACVW and MenB as above.   - Gamma globulin status:   Recent Labs   Lab Test 10/25/24  1303   *     - Antibiotic allergies: Rash with bactrim. Tolerated TMP/SMX OK in 11/2024 so this was removed as  an allergy.      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 Bone biopsy from 3/31/23 without culture growth. No cellular material present. Treated with 8 weeks IV CTX (end 5/22/2023). Re=admitted 8/2023 with increasing back pain and progression of discitis/osteomyelitis. Treated with vancomycin and ertapenem for 8 weeks. Transitioned to PO clindamycin-->doxycycline and Augmentin for an additional 4 weeks. Repeat spinal imaging on 11/29 with significant improvement.    COVID-19 x2: Diagnosed last in 1/2023. Treated with Paxlovid  CMV Viremia: Diagnosed in 4/2022. Treated with Valcyte 900 mg BID.   Oral Herpes : Initialy diagnosed in 12/2023. Treated with Valtrex with subsequent recurrence. Has been on acyclvoir PPx since 1/20/2024 without recurrence.   RSV PNA (3/26/2024): S/p ribavirin   Interval Events   Since seen by me in the hospital on 11/27/24, he has been doing well. He has continued his atovquone (today last day) without issue. Working with BMT to try and decrease steroids. Seen by Dr. Garcia today who reviewed CT chest. Noted this appeared stable from previously. 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 Course c/b recurrent oral HSV as well as skin, ocular, oral, and liver cGVHD. He is is currently on 15 mg/day prednisone and dex swish and swallow. We are seeing him in clinic today for unexplained fevers and pulmonary GGOs.     Elevated temp starting on 10/14. Was 101.8 on 10/24. Had fatigue, muscle aches, and headaches. Bronchoscopy 11/01. Since 11/5 his temp has been normal.  Feels well except for fatigue and SOB with exertion since the bronchoscopy. No  cough, sore throat, nausea, vomiting, diarrhea. No recent sick contacts. He completed a 7d course of doxycycline (10/18 started). Has been on posaconazole since 11/2.     Fever history is as follows:   10/24/24: Developed fevers to 101 with early morning chills and shivering. No other localizing symptoms.   10/20/24: Started doxyclcine for possible tick-borne illness, given extensive exposure history  10/21/24: CT chest with peribronchovasciular GGOs in apical and superior segments of bilateral lower lobes,   10/25/2024: Seen by Dr. Cote and still noted fevers. CT chest with bilateral GGOs.   11/1/2024: BAL completed with NGTD.     Of note, he was on tacrolimus for his GVHD from 8/2021-9/2022. Switched to sirolimus from 9-10/2022. Has not been on either since. For his prednisone, he has been on steroids since 3/2022. Has been on 15 mg/day since 9/9/2024 (was on 10 previously).       Exposure History   -[place] 2024  -Cabin near [facility]-- demo'd the garage and there was mold found, but he stayed outside of the garage during this time and wore N95  -Hasn't been in water  -No travel to [place]  -[place], [place], [place] in 2019  -No pets at home. Got scratches from neighbor's cat this past summer and daughter's dog.  -No time in  or prisons.     Vaccines     Immunization History   Administered Date(s) Administered     COVID-19 12+ (MODERNA) 09/24/2024     COVID-19 12+ (Pfizer) 11/14/2023     COVID-19 Bivalent 12+ (Pfizer) 11/16/2022     COVID-19 MONOVALENT 12+ (Pfizer) 11/18/2021, 12/14/2021     DTAP,IPV,HIB,HEPB (VAXELIS) 05/09/2024     DTaP/HepB/IPV 11/14/2023     HIB (PRP-T) 11/14/2023     Influenza Vaccine 18-64 (Flublok) 10/12/2021     Influenza Vaccine 65+ (FLUAD) 09/08/2023     Meningococcal ACWY (Menveo ) 11/28/2024     Meningococcal B (Bexsero ) 11/28/2024     Pneumo Conj 13-V (2010&after) 11/14/2023     Pneumococcal 20 valent Conjugate (Prevnar 20) 05/09/2024     TDAP  (Adacel,Boostrix) 10/27/2016     Zoster recombinant adjuvanted (SHINGRIX) 11/14/2023, 01/09/2024       Medications     Current Outpatient Medications   Medication Sig Dispense Refill     acyclovir (ZOVIRAX) 400 MG tablet Take 1 tablet (400 mg) by mouth every 12 hours.       atovaquone (MEPRON) 750 MG/5ML suspension Take 5 mLs (750 mg) by mouth 2 times daily for 13 days. 130 mL 0     Carboxymethylcell-Glycerin PF (REFRESH RELIEVA PF) 0.5-1 % SOLN Apply 1 drop to eye 4 times daily Preservative free. Either PF multidose bottle or PF vials 30 each 11     dexAMETHasone alcohol-free (DECADRON) 0.1 MG/ML solution Swish and spit 10 mLs (1 mg) in mouth 2 times daily. 500 mL 2     escitalopram (LEXAPRO) 10 MG tablet Take 1 tablet (10 mg) by mouth daily 30 tablet 3     fluorometholone (FML LIQUIFILM) 0.1 % ophthalmic suspension Place 1 drop Into the left eye 3 times daily. 5 mL 1     levothyroxine (SYNTHROID/LEVOTHROID) 112 MCG tablet Take 1 tablet (112 mcg) by mouth daily. 90 tablet 4     LORazepam (ATIVAN) 1 MG tablet Take 1 mg by mouth every 6 hours as needed for anxiety. PRN       metroNIDAZOLE (METROCREAM) 0.75 % external cream Apply topically 2 times daily Apply to the nose. 45 g 3     nicotine polacrilex (NICORETTE) 4 MG gum Place 4 mg inside cheek daily.       Nutritional Supplements (ENSURE COMPLETE SHAKE) LIQD Take 11 mLs by mouth every morning       omeprazole (PRILOSEC) 40 MG DR capsule Take 1 capsule (40 mg) by mouth daily 30 capsule 3     penicillin V (VEETID) 250 MG tablet Take 1 tablet (250 mg) by mouth 2 times daily. 60 tablet 1     predniSONE (DELTASONE) 10 MG tablet Take 1 tablet (10 mg) by mouth daily 90 tablet 2     predniSONE (DELTASONE) 5 MG tablet Take 1 tablet (5 mg) by mouth daily. Currently taking 15 mg daily ( on taper) 90 tablet 1     rosuvastatin (CRESTOR) 5 MG tablet Take 1 tablet (5 mg) by mouth daily. 30 tablet 3     sennosides (SENOKOT) 8.6 MG tablet Take 1 tablet by mouth 3 times daily as  needed for constipation 90 tablet 0     sodium chloride 0.9 % neb solution 3 mLs by Other route 2 times daily 300 mL 11     tacrolimus (PROTOPIC) 0.1 % external ointment Apply topically 2 times daily 30 g 1     No current facility-administered medications for this visit.          Physical Exam     Physical Exam    Vital signs:  BP (!) 143/81   Pulse 75   Wt 120.9 kg (266 lb 9 oz)   SpO2 98%   BMI 36.11 kg/m      GENERAL: alert and no distress. Sitting up in chair and talking with wife.   RESP: No increased work of breathing or respiratory distress.   CV: No evidence of cyanosis.   Skin: NO skin rashes/lesions     Data     Data  Laboratory data and imaging listed below was reviewed prior to this encounter.   CT chest (10/21/2024) with patchy peribronchovascular GGOs with apical predominance. BAL (11/1/2024) without growth to date and negative BAL galactomannan (0.06). Blood cutlure (10/15/21) NGTD. RVP/influenza/SARS CoV-2 (10/15/24), Lyme antibody (10/18/24), CrAG (10/18/24) all negative. CMV VL negative (10/25) and EBV VL <35 (10/25/24). Nocardia (11/1/24) negative. Fungal/yeast culture (11/1/24) negative. Leukocytosis with elevated neutrophils 10/18/24 and 10/25/24.          ----- Service Performed and Documented by Resident or Fellow ------           Again, thank you for allowing me to participate in the care of your patient.      Sincerely,    Alba Markham MD

## 2024-12-10 NOTE — NURSING NOTE
Chief Complaint   Patient presents with    RECHECK     1 month follow up      BP (!) 143/81   Pulse 75   Wt 120.9 kg (266 lb 9 oz)   SpO2 98%   BMI 36.11 kg/m    Yamilet Ba CMA on 12/10/2024 at 3:12 PM

## 2024-12-10 NOTE — PROGRESS NOTES
[facility] CLINIC [address] [address]  Phone: [phone]  Fax: [phone]    Patient:  Nik Nava, Date of birth 1960  Date of Visit:  12/10/2024  Referring Provider Alba Markham MD  Reason for visit: fever of unknown origin    Recommendations   PJP Pneumonia  Completed last day of atovaquone 750 mg BID yesterday  Start PJP prophylaxis with TMP/SMX DS M/W/F this week. Should be continued indefinitely while on 15 mg/day of steroids.   I would not consider taking him off of PPx until he is consistently on <10 mg/day of prednisone.   Repeat BMP in the next 10-14 days after starting TMP/SMX to ensure this remains stable     Functional Asplenia  Continue PCN V for PPx  Up-to-date on pneumococcal and HIB vaccines   Currently getting meningitis series. He will schedule a nurse visit in ~8 weeks to finish his meningitis shots.   Menveo (MenACWY): Dose 1 on 11/28. Needs Dose 2 in ~8 weeks. Should get boosters every 5 years  Bexsero (MenB): Dose 1 on 11/28. Needs Dose 2 >4 weeks. Should get boosters at 1 year and every 2-3 years thereafter    Other  Continue Valtrex PPx  Will need COVID-19 booster in late March/early April     Follow-up in 3 months after repeat CT chest.     I spent 48 minutes reviewing the patient, reviewing the medical record, and interpreting patient findings, and coordinating care     The longitudinal plan of care for the PJP Pneumonia, functional asplenia, and GVHD as documented were addressed during this visit. Due to the added complexity in care, I will continue to support Nik in the subsequent management and with ongoing continuity of care.    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 He is is currently on 15 mg/day prednisone and dex  swish and swallow. We are seeing him in clinic today for unexplained fevers and pulmonary GGOs.     # PJP pneumonia   # Chronic steroid use (15 mg/kg/day)   Recently experienced unexplained fevers from 10/24-11/5 with CT chest (10/21) with peribronchovascular GGOs. BAL from 11/1 positive for PJP. Started on TMP/SMX at 15 mg/kg/day on 11/18/24. Developed nausea, vomiting, and hyperkalemia on 11/25/24. Transitioned to atovaquone on 11/25. Will plan for 21 day course of treatment (Dates: 11/18-12/10). He can transition to TMP/SMX prophylaxis indefinitely thereafter.     #Functional asplenia  On PCN V PPx  Currently receiving Meningitis vaccine series   Up-to-date on PCV-20 and HIB    Transplant Checklist  - QTc interval: 446 (11/25/24)  - Pneumocystis prophylaxis: TMP/SMX MWF. Previously on pentamidine, but stopped in 6/2023 when CD4>200. PJP PCR from Bal positive on 11/1/2024. Started on TMP/SMX at 15 mg/kg/day on 11/18/24. Transitioned to atovaquone on 11/25/24 due to hyperkalemia. Transitioned to TMP/SMX DS on 12/11/2024.   - Bacterial prophylaxis: Penicillin for asplenia   - Fungal prophylaxis: None.   - Serostatus & viral prophylaxis: CMV D+/R+, HSV ?, EBV + Acyclovir. Developed recurrent oral HSV after stopping acyclovir.   - Immunization status:  Up-to-date on seasonal COVID-19 and influenza, Shingrix, RSV (8/2024), PCV-20 (5/2024), HBV, and Tetanus (5/2024).  Currently receiving MenACVW and MenB as above.   - Gamma globulin status:   Recent Labs   Lab Test 10/25/24  1303   *     - Antibiotic allergies: Rash with bactrim. Tolerated TMP/SMX OK in 11/2024 so this was removed as an allergy.      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 Bone biopsy from 3/31/23 without culture growth. No cellular material present. Treated with 8 weeks IV CTX (end 5/22/2023). Re=admitted 8/2023 with increasing back pain and  progression of discitis/osteomyelitis. Treated with vancomycin and ertapenem for 8 weeks. Transitioned to PO clindamycin-->doxycycline and Augmentin for an additional 4 weeks. Repeat spinal imaging on 11/29 with significant improvement.    COVID-19 x2: Diagnosed last in 1/2023. Treated with Paxlovid  CMV Viremia: Diagnosed in 4/2022. Treated with Valcyte 900 mg BID.   Oral Herpes : Initialy diagnosed in 12/2023. Treated with Valtrex with subsequent recurrence. Has been on acyclvoir PPx since 1/20/2024 without recurrence.   RSV PNA (3/26/2024): S/p ribavirin   Interval Events   Since seen by me in the hospital on 11/27/24, he has been doing well. He has continued his atovquone (today last day) without issue. Working with BMT to try and decrease steroids. Seen by Dr. Garcia today who reviewed CT chest. Noted this appeared stable from previously. 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 Course c/b recurrent oral HSV as well as skin, ocular, oral, and liver cGVHD. He is is currently on 15 mg/day prednisone and dex swish and swallow. We are seeing him in clinic today for unexplained fevers and pulmonary GGOs.     Elevated temp starting on 10/14. Was 101.8 on 10/24. Had fatigue, muscle aches, and headaches. Bronchoscopy 11/01. Since 11/5 his temp has been normal.  Feels well except for fatigue and SOB with exertion since the bronchoscopy. No cough, sore throat, nausea, vomiting, diarrhea. No recent sick contacts. He completed a 7d course of doxycycline (10/18 started). Has been on posaconazole since 11/2.     Fever history is as follows:   10/24/24: Developed fevers to 101 with early morning chills and shivering. No other localizing symptoms.   10/20/24: Started  doxyclcine for possible tick-borne illness, given extensive exposure history  10/21/24: CT chest with peribronchovasciular GGOs in apical and superior segments of bilateral lower lobes,   10/25/2024: Seen by Dr. Cote and still noted fevers. CT chest with bilateral GGOs.   11/1/2024: BAL completed with NGTD.     Of note, he was on tacrolimus for his GVHD from 8/2021-9/2022. Switched to sirolimus from 9-10/2022. Has not been on either since. For his prednisone, he has been on steroids since 3/2022. Has been on 15 mg/day since 9/9/2024 (was on 10 previously).       Exposure History   -[place] 2024  -Cabin near [facility]-- demo'd the garage and there was mold found, but he stayed outside of the garage during this time and wore N95  -Hasn't been in water  -No travel to [place]  -[place], [place], [place] in 2019  -No pets at home. Got scratches from neighbor's cat this past summer and daughter's dog.  -No time in  or prisons.     Vaccines     Immunization History   Administered Date(s) Administered    COVID-19 12+ (MODERNA) 09/24/2024    COVID-19 12+ (Pfizer) 11/14/2023    COVID-19 Bivalent 12+ (Pfizer) 11/16/2022    COVID-19 MONOVALENT 12+ (Pfizer) 11/18/2021, 12/14/2021    DTAP,IPV,HIB,HEPB (VAXELIS) 05/09/2024    DTaP/HepB/IPV 11/14/2023    HIB (PRP-T) 11/14/2023    Influenza Vaccine 18-64 (Flublok) 10/12/2021    Influenza Vaccine 65+ (FLUAD) 09/08/2023    Meningococcal ACWY (Menveo ) 11/28/2024    Meningococcal B (Bexsero ) 11/28/2024    Pneumo Conj 13-V (2010&after) 11/14/2023    Pneumococcal 20 valent Conjugate (Prevnar 20) 05/09/2024    TDAP (Adacel,Boostrix) 10/27/2016    Zoster recombinant adjuvanted (SHINGRIX) 11/14/2023, 01/09/2024       Medications     Current Outpatient Medications   Medication Sig Dispense Refill    acyclovir (ZOVIRAX) 400 MG tablet Take 1 tablet (400 mg) by mouth every 12 hours.      atovaquone (MEPRON) 750 MG/5ML suspension Take 5 mLs (750 mg) by mouth 2  times daily for 13 days. 130 mL 0    Carboxymethylcell-Glycerin PF (REFRESH RELIEVA PF) 0.5-1 % SOLN Apply 1 drop to eye 4 times daily Preservative free. Either PF multidose bottle or PF vials 30 each 11    dexAMETHasone alcohol-free (DECADRON) 0.1 MG/ML solution Swish and spit 10 mLs (1 mg) in mouth 2 times daily. 500 mL 2    escitalopram (LEXAPRO) 10 MG tablet Take 1 tablet (10 mg) by mouth daily 30 tablet 3    fluorometholone (FML LIQUIFILM) 0.1 % ophthalmic suspension Place 1 drop Into the left eye 3 times daily. 5 mL 1    levothyroxine (SYNTHROID/LEVOTHROID) 112 MCG tablet Take 1 tablet (112 mcg) by mouth daily. 90 tablet 4    LORazepam (ATIVAN) 1 MG tablet Take 1 mg by mouth every 6 hours as needed for anxiety. PRN      metroNIDAZOLE (METROCREAM) 0.75 % external cream Apply topically 2 times daily Apply to the nose. 45 g 3    nicotine polacrilex (NICORETTE) 4 MG gum Place 4 mg inside cheek daily.      Nutritional Supplements (ENSURE COMPLETE SHAKE) LIQD Take 11 mLs by mouth every morning      omeprazole (PRILOSEC) 40 MG DR capsule Take 1 capsule (40 mg) by mouth daily 30 capsule 3    penicillin V (VEETID) 250 MG tablet Take 1 tablet (250 mg) by mouth 2 times daily. 60 tablet 1    predniSONE (DELTASONE) 10 MG tablet Take 1 tablet (10 mg) by mouth daily 90 tablet 2    predniSONE (DELTASONE) 5 MG tablet Take 1 tablet (5 mg) by mouth daily. Currently taking 15 mg daily ( on taper) 90 tablet 1    rosuvastatin (CRESTOR) 5 MG tablet Take 1 tablet (5 mg) by mouth daily. 30 tablet 3    sennosides (SENOKOT) 8.6 MG tablet Take 1 tablet by mouth 3 times daily as needed for constipation 90 tablet 0    sodium chloride 0.9 % neb solution 3 mLs by Other route 2 times daily 300 mL 11    tacrolimus (PROTOPIC) 0.1 % external ointment Apply topically 2 times daily 30 g 1     No current facility-administered medications for this visit.          Physical Exam     Physical Exam     Vital signs:  BP (!) 143/81   Pulse 75   Wt 120.9  kg (266 lb 9 oz)   SpO2 98%   BMI 36.11 kg/m      GENERAL: alert and no distress. Sitting up in chair and talking with wife.   RESP: No increased work of breathing or respiratory distress.   CV: No evidence of cyanosis.   Skin: NO skin rashes/lesions     Data     Data   Laboratory data and imaging listed below was reviewed prior to this encounter.   CT chest (10/21/2024) with patchy peribronchovascular GGOs with apical predominance. BAL (11/1/2024) without growth to date and negative BAL galactomannan (0.06). Blood cutlure (10/15/21) NGTD. RVP/influenza/SARS CoV-2 (10/15/24), Lyme antibody (10/18/24), CrAG (10/18/24) all negative. CMV VL negative (10/25) and EBV VL <35 (10/25/24). Nocardia (11/1/24) negative. Fungal/yeast culture (11/1/24) negative. Leukocytosis with elevated neutrophils 10/18/24 and 10/25/24.          ----- Service Performed and Documented by Resident or Fellow ------

## 2024-12-10 NOTE — PATIENT INSTRUCTIONS
It was great to see you in clinic today! Glad you're feeling much better. We discussed the following:     Tomorrow,  you can start taking Bactrim 160/800 mg three times weekly (usually people do on Mon/Wed/Fri). Continue this while you are on high-dose steroids.   Get your kidney test and your potassium tested the end of next week/early the week after   Continue your penicillin to prevent invasive pneumococcal pneumonia  Complete your meningitis vaccine series  Menactra (MenACWY): Get a second dose in 8 or more weeks. You should then get a booster every 5 years.   Bexero (MenB): Get a second dose in 4+ weeks. You should get a booster at 1 year and every 2-3 years thereafter.   Get your COVID-19 booster at the end of March/beginning of April   You are currently up-to-date on all your other vaccines    Follow-up with me in ~3 months after your next chest CT to touch base

## 2024-12-11 ENCOUNTER — DOCUMENTATION ONLY (OUTPATIENT)
Dept: ONCOLOGY | Facility: CLINIC | Age: 64
End: 2024-12-11

## 2024-12-11 ENCOUNTER — ONCOLOGY VISIT (OUTPATIENT)
Dept: TRANSPLANT | Facility: CLINIC | Age: 64
End: 2024-12-11
Attending: INTERNAL MEDICINE
Payer: MEDICARE

## 2024-12-11 ENCOUNTER — TELEPHONE (OUTPATIENT)
Dept: ONCOLOGY | Facility: CLINIC | Age: 64
End: 2024-12-11

## 2024-12-11 VITALS
OXYGEN SATURATION: 97 % | TEMPERATURE: 97.9 F | HEART RATE: 76 BPM | BODY MASS INDEX: 35.72 KG/M2 | WEIGHT: 263.7 LBS | DIASTOLIC BLOOD PRESSURE: 84 MMHG | SYSTOLIC BLOOD PRESSURE: 140 MMHG | RESPIRATION RATE: 16 BRPM

## 2024-12-11 DIAGNOSIS — Z94.81 S/P BONE MARROW TRANSPLANT (H): ICD-10-CM

## 2024-12-11 DIAGNOSIS — Z79.899 NEED FOR PROPHYLACTIC CHEMOTHERAPY: Primary | ICD-10-CM

## 2024-12-11 DIAGNOSIS — R50.9 FEVER, UNSPECIFIED FEVER CAUSE: ICD-10-CM

## 2024-12-11 DIAGNOSIS — N18.2 CKD (CHRONIC KIDNEY DISEASE) STAGE 2, GFR 60-89 ML/MIN: ICD-10-CM

## 2024-12-11 DIAGNOSIS — D89.813 GVHD AS COMPLICATION OF BONE MARROW TRANSPLANT (H): Primary | ICD-10-CM

## 2024-12-11 DIAGNOSIS — B59 PNEUMONIA OF BOTH UPPER LOBES DUE TO PNEUMOCYSTIS JIROVECII (H): ICD-10-CM

## 2024-12-11 DIAGNOSIS — T86.09 GVHD AS COMPLICATION OF BONE MARROW TRANSPLANT (H): Primary | ICD-10-CM

## 2024-12-11 LAB
ALBUMIN SERPL BCG-MCNC: 4 G/DL (ref 3.5–5.2)
ALP SERPL-CCNC: 85 U/L (ref 40–150)
ALT SERPL W P-5'-P-CCNC: 27 U/L (ref 0–70)
ANION GAP SERPL CALCULATED.3IONS-SCNC: 8 MMOL/L (ref 7–15)
AST SERPL W P-5'-P-CCNC: 32 U/L (ref 0–45)
BASOPHILS # BLD AUTO: 0 10E3/UL (ref 0–0.2)
BASOPHILS NFR BLD AUTO: 0 %
BILIRUB SERPL-MCNC: 0.9 MG/DL
BUN SERPL-MCNC: 29.5 MG/DL (ref 8–23)
CALCIUM SERPL-MCNC: 9.7 MG/DL (ref 8.8–10.4)
CHLORIDE SERPL-SCNC: 104 MMOL/L (ref 98–107)
CMV DNA SPEC NAA+PROBE-ACNC: <35 IU/ML
CMV DNA SPEC NAA+PROBE-LOG#: <1.5 {LOG_COPIES}/ML
CREAT SERPL-MCNC: 1.09 MG/DL (ref 0.67–1.17)
EBV DNA SERPL NAA+PROBE-ACNC: NOT DETECTED IU/ML
EGFRCR SERPLBLD CKD-EPI 2021: 75 ML/MIN/1.73M2
EOSINOPHIL # BLD AUTO: 0.1 10E3/UL (ref 0–0.7)
EOSINOPHIL NFR BLD AUTO: 1 %
ERYTHROCYTE [DISTWIDTH] IN BLOOD BY AUTOMATED COUNT: 17.8 % (ref 10–15)
GLUCOSE SERPL-MCNC: 100 MG/DL (ref 70–99)
HCO3 SERPL-SCNC: 27 MMOL/L (ref 22–29)
HCT VFR BLD AUTO: 33.2 % (ref 40–53)
HGB BLD-MCNC: 11.3 G/DL (ref 13.3–17.7)
IMM GRANULOCYTES # BLD: 0 10E3/UL
IMM GRANULOCYTES NFR BLD: 0 %
LYMPHOCYTES # BLD AUTO: 3.4 10E3/UL (ref 0.8–5.3)
LYMPHOCYTES NFR BLD AUTO: 43 %
MCH RBC QN AUTO: 33.1 PG (ref 26.5–33)
MCHC RBC AUTO-ENTMCNC: 34 G/DL (ref 31.5–36.5)
MCV RBC AUTO: 97 FL (ref 78–100)
MONOCYTES # BLD AUTO: 0.8 10E3/UL (ref 0–1.3)
MONOCYTES NFR BLD AUTO: 10 %
NEUTROPHILS # BLD AUTO: 3.5 10E3/UL (ref 1.6–8.3)
NEUTROPHILS NFR BLD AUTO: 45 %
NRBC # BLD AUTO: 0 10E3/UL
NRBC BLD AUTO-RTO: 0 /100
PLATELET # BLD AUTO: 194 10E3/UL (ref 150–450)
POTASSIUM SERPL-SCNC: 4.1 MMOL/L (ref 3.4–5.3)
PROT SERPL-MCNC: 6.7 G/DL (ref 6.4–8.3)
RBC # BLD AUTO: 3.41 10E6/UL (ref 4.4–5.9)
SODIUM SERPL-SCNC: 139 MMOL/L (ref 135–145)
SPECIMEN TYPE: ABNORMAL
WBC # BLD AUTO: 7.9 10E3/UL (ref 4–11)

## 2024-12-11 PROCEDURE — 84155 ASSAY OF PROTEIN SERUM: CPT | Performed by: INTERNAL MEDICINE

## 2024-12-11 PROCEDURE — 85004 AUTOMATED DIFF WBC COUNT: CPT | Performed by: INTERNAL MEDICINE

## 2024-12-11 PROCEDURE — 82435 ASSAY OF BLOOD CHLORIDE: CPT | Performed by: INTERNAL MEDICINE

## 2024-12-11 PROCEDURE — 99214 OFFICE O/P EST MOD 30 MIN: CPT | Performed by: INTERNAL MEDICINE

## 2024-12-11 PROCEDURE — 36591 DRAW BLOOD OFF VENOUS DEVICE: CPT | Performed by: INTERNAL MEDICINE

## 2024-12-11 PROCEDURE — G0463 HOSPITAL OUTPT CLINIC VISIT: HCPCS | Performed by: INTERNAL MEDICINE

## 2024-12-11 PROCEDURE — 250N000011 HC RX IP 250 OP 636: Performed by: INTERNAL MEDICINE

## 2024-12-11 PROCEDURE — 87799 DETECT AGENT NOS DNA QUANT: CPT | Performed by: INTERNAL MEDICINE

## 2024-12-11 PROCEDURE — G2211 COMPLEX E/M VISIT ADD ON: HCPCS | Performed by: INTERNAL MEDICINE

## 2024-12-11 RX ORDER — HEPARIN SODIUM (PORCINE) LOCK FLUSH IV SOLN 100 UNIT/ML 100 UNIT/ML
5 SOLUTION INTRAVENOUS
Status: COMPLETED | OUTPATIENT
Start: 2024-12-11 | End: 2024-12-11

## 2024-12-11 RX ADMIN — HEPARIN 5 ML: 100 SYRINGE at 07:11

## 2024-12-11 ASSESSMENT — PAIN SCALES - GENERAL: PAINLEVEL_OUTOF10: NO PAIN (0)

## 2024-12-11 NOTE — NURSING NOTE
"Oncology Rooming Note    December 11, 2024 7:25 AM   Nik Nava is a 64 year old male who presents for:    Chief Complaint   Patient presents with    Oncology Clinic Visit     Acute lymphoblastic leukemia      Initial Vitals: BP (!) 140/84 (BP Location: Right arm, Patient Position: Sitting, Cuff Size: Adult Large)   Pulse 76   Temp 97.9  F (36.6  C) (Oral)   Resp 16   Wt 119.6 kg (263 lb 11.2 oz)   SpO2 97%   BMI 35.72 kg/m   Estimated body mass index is 35.72 kg/m  as calculated from the following:    Height as of 11/25/24: 1.83 m (6' 0.05\").    Weight as of this encounter: 119.6 kg (263 lb 11.2 oz). Body surface area is 2.47 meters squared.  No Pain (0) Comment: Data Unavailable   No LMP for male patient.  Allergies reviewed: Yes  Medications reviewed: Yes    Medications: Medication refills not needed today.  Pharmacy name entered into [employer]:    [facility] - [place], [place] - [address] [facility] [place], [place] - [address] [place] [address] [phone]  [facility]  [facility] - [place], [place] - [address] [place] [address]    Frailty Screening:   Is the patient here for a new oncology consult visit in cancer care? 2. No      Clinical concerns:  wondering if he can lower dose on prednisone     Susanne Mehta              "

## 2024-12-11 NOTE — TELEPHONE ENCOUNTER
Prior Authorization Approval    Medication: JAKAFI 5 MG PO TABS  Authorization Effective Date: 1/1/2024  Authorization Expiration Date: 12/31/2024  Approved Dose/Quantity:   Reference #:     Insurance Company: [employer] Part D - Phone [phone] Fax [phone]  Expected CoPay: $ 2,700  CoPay Card Available: No    Financial Assistance Needed:   Which Pharmacy is filling the prescription:    Pharmacy Notified:   Patient Notified:         Thuy Roldan CPOncology Pharmacy Liaison     [facility] [place] [address]  Office: [phone]  Fax: [phone]  [email or web address]

## 2024-12-11 NOTE — TELEPHONE ENCOUNTER
TRUPTI APPROVED    Medication: JAKAFI 5 MG PO TABS  Amount: $ 3,800  Foundation Name: PANF  Foundation Effective Date: 9/11/2024  Foundation Expiration Date: 12/9/2025  Additional Information:   Patient Notified:         Thuy Roldan CPhTOncology Pharmacy Liaison     [facility] [address] [address]  Office: [phone]  Fax: [phone]  [email or web address]

## 2024-12-11 NOTE — LETTER
12/11/2024      Nik Nava  [address] [address] [facility] [address]      Dear Colleague,    Thank you for referring your patient, Nik Nava, to the [facility]PL[facility]. Please see a copy of my visit note below.    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     Recently with diagnosis of PJP pneumonia. 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    HPI: Nik returns today for follow-up of recent treatment for PJP pneumonia and admission due to Bactrim toxicity.  He has completed his course of atovaquone, recovered from the Bactrim toxicity, and he is generally feeling well.  His signs or symptoms of GVHD at this point include dry eyes and dry mouth.  He has no active mucositis or active rash.    ROS: 10 point Review of systems was negative except as detailed above     PHYSICAL EXAM          Blood pressure (!) 140/84, pulse 76, temperature 97.9  F (36.6  C), temperature source Oral, resp. rate 16, weight 119.6 kg (263 lb 11.2 oz), SpO2 97%.    Wt Readings from Last 4 Encounters:   12/11/24 119.6 kg (263 lb 11.2 oz)   12/10/24 120.9 kg (266 lb 9 oz)   12/10/24 122.3 kg (269 lb 9.6 oz)   11/28/24 117.9 kg (260 lb)      KPS: 70  General: NAD.  HEENT: sclera anicteric. Lichenoid changes and associated erythema of b/l buccal mucosa.  No active ulceration.  Lymph nodes: No lymphadenopathy in his head neck region, upper chest, or axilla  Lungs: CTA b/l  no wheezes  CV: RRR  no gallop  Abd: Soft, no tenderness to palpation  Ext/Skin: Chronic skin GVHD change at back. Faint scattered erythema lesion. Xerosis.      Chronic GVHD          12/11/2024    09:07 10/27/2024    09:21   Chronic GVHD   Has chronic gaskw-irjhqi-mson disease developed since the last entry? No No   Is there persistence of chronic neypt-vfcniw-pwno disease since the last entry? Yes Yes    Chronic Ifhbq-Uguwni-Pdbq Disease Global Severity Score  Moderate   Total Chronic Cdtau-Jicejo-Xunr Disease Score 2 3   Subjective Clinician Opinion of Severity Mild Mild       Blood Counts     Recent Labs   Lab Test 12/11/24  0719 11/28/24  0352 11/26/24  0330 11/25/24  1744 11/25/24  1432   HGB 11.3*  --  12.1* 12.3* 12.6*   HCT 33.2*  --  35.0* 35.5* 36.3*   WBC 7.9  --  7.0 7.1 9.0   ANEUTAUTO 3.5  --   --  4.5 4.6   ALYMPAUTO 3.4  --   --  1.9 3.7   AMONOAUTO 0.8  --   --  0.6 0.5   AEOSAUTO 0.1  --   --  0.0 0.0   ABSBASO 0.0  --   --  0.0 0.0   NRBCMAN 0.0  --   --  0.0 0.0    188 219 230 224       Chemistries     Basic Panel  Recent Labs   Lab Test 12/11/24 0719 11/28/24 0352 11/27/24  1755    132* 130*   POTASSIUM 4.1 4.6 5.0   CHLORIDE 104 99 98   CO2 27 21* 21*   BUN 29.5* 40.5* 39.9*   CR 1.09 1.71* 2.10*   * 99 145*      Calcium, Magnesium, Phosphorus  Recent Labs   Lab Test 12/11/24  0719 11/28/24  0352 11/27/24  1755 11/27/24  1234 11/27/24  0325 11/26/24  1100 11/26/24  0330   DAMON 9.7 9.7 9.8   < > 9.8   < > 10.5*   MAG  --  2.0  --   --  2.2  --  2.2   PHOS  --  3.5  --   --  4.3  --  4.2    < > = values in this interval not displayed.      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     1. Acute lymphoblastic leukemia, Dyer chromosome positive. S/p VERÓNICA allo sib PBSCT    Most recent restaging at 2Y consistent with MRD negative CR. BM % donor. FISH No evidence of BCR::ABL1 fusion. CG No recipient (male) cells or cells with a t(9;22) or other clonal chromosomal abnormality were detected among the 20 metaphases analyzed.    -BCRABL monitoring quarterly    2.  HEME:   Counts recovered, transfusion independent.    Anemia of chronic disease  Fluctuated during prior infection. Periodically monitor ferritin.     3. 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     Ocular GVHD - NIH score 1  Follows with ophthalmology, last visit 10/11/2024.  - Currently using scleral lenses and PF AT. Not on topical steroids now.     Oral GVHD - NIH score 1  Lichenoid change of L buccal mucosa. Adequate pain control.  - Dexamethasone s/s prn    Systemic treatment for GVHD  Corticosteroids - On steroid chronically due to frequent flare of GVH with taper. Recently with GVHD flare while tapered down to 10 mg daily, most recently adjusted to 15 mg daily which is his current dose.  - Continue prednisone 15 mg daily. Will consider slow taper, +\- steroid sparing agents if symptoms stable at next visit.    GVHD history  cGVHD therapy started on February 24 2022  - Baseline NIH scoring 2/24/2022 : Skin 2 maculopapular rash/erythema with no sclerotic features, mouth score 1 mild symptoms with lichenoid changes less than 25%, eyes score 2 moderate symptoms without new visual impairments due to KCS requiring lubricant eyedrops more than 3 times a day, overall mild scale for her provider     Previous therapies  Tacrolimus - Experienced mild NEGRA, hyperkalemia, hypomagnesemia, and reports some tremors and cramps. Switched to sirolimus Sep 2022.  Sirolimus - Discontinued Oct 2022 due to GVH flare and significant fluid retention and proteinuria.  Belumosudil - Patient intolerant/with disease flares on tacrolimus and sirolimus as above. This was started on Oct 2022 - skin/liver/oral GVHD inactive, and occular symptoms controlled/symptomatic improvement. Discontinued Oct 2023 due to recurrent infections.  Ruxolitinib - Dec 2024: We will plan to start 5 mg twice daily, with a plan to continue in order to taper prednisone down to  5 mg daily or equivalent or below.    4. ID  PJP pneumonia  Recent history of persistent fever with patchy peribronchovascular groundglass opacities on CT (10/21/2024). Extensive work-up revealed PJP positive from BAL (11/1/2024)  - Continue high-dose bactrim per ID recommendation (11/18/2024 - , plan for 21 days)  - Recommend folic acid supplement while on high-dose bactrim    Rhonovirus infection  Tested positive on 11/18/2024 after he developed fatigue, malaise, URI symptoms.    Prophylaxis   - ACV Patient developed oral herpes reactivation after stopping acyclovir in Jan 2023  - Penicillin 250 mg BID due to atrophic spleen  Hypogammaglobulinemia with recurrent infections: Maintain IgG > 400    Vaccinations  Completed 1Y and 2Y vaccination series.  Received his influenza/COVID vaccine 9/24/2024 and RSV vaccine 8/13.   Complete pneumococcal and HiB vaccine. Recommend meningococcal vaccine once infection resolved.    5. Renal  History of RCC s/p R nephrectomy in 2007.    Elevated Cr  Hyponatremia and hyperkalemia  Elevated Cr at fro baseline around 1.2 with mild hyponatremia and mild hyperkalemia secondary to Bactrim. Given overall stability of Cr, more likely related to decreased Cr excretion from bactrim than actual kidney injury.  - Closely monitor renal function and electrolyte    6. Endo:   - Hx of graves disease; On synthroid follows with endocrine  - HLD; On rosuvastatin    7. CV:   - Hypertension; On amlodipine    8. Psych:   - Situational anxiety; Lexapro 10mg daily.     Summary: Today I talked about the pros and cons of different approaches to treat his GVHD primarily for the purpose of tapering down his prednisone.  We agreed to begin ruxolitinib 5 mg twice daily.  In addition he will taper his prednisone to 15 mg alternating with 10.  Will plan for monthly tapering of the prednisone with a goal of discontinuation sometime over the next 6 months or so depending on his hypoadrenal state.    Plan:  - Taper  prednisone 15/10 mg daily  - start TMP/sulfa at prophylaxis dosing  - continue acyclovir, PenVK  - start ruxolitinib 5 mg BID  - BMP weekly x 2  - RTC 1 month for GVHD review and to continue taper  - review vaccines next visit    The longitudinal plan of care for the diagnosis(es)/condition(s) as documented were addressed during this visit. Due to the added complexity in care, I will continue to support Nik in the subsequent management and with ongoing continuity of care.      30 minutes spent on the date of the encounter doing chart review, history and exam, lab review, documentation and coordniating care.    DOMINIQUE BRITTON MD  December 11, 2024        Again, thank you for allowing me to participate in the care of your patient.        Sincerely,        DOMINIQUE BRITTON MD

## 2024-12-11 NOTE — NURSING NOTE
"Chief Complaint   Patient presents with    Oncology Clinic Visit     Acute lymphoblastic leukemia     Port Draw     Labs drawn from port by rn.  VS taken.     Port accessed with 20 gauge 3/4\" Power needle and labs drawn by rn.  Port flushed with NS and heparin.  Pt tolerated well.  VS taken.  Pt checked in for next appt.    Argenis Watkins RN      "

## 2024-12-11 NOTE — ORAL ONC MGMT
"Oral Chemotherapy Monitoring Program    Lab Monitoring Plan  CBC and CMP: baseline, q2w x2 months, then monthly  Lipids: baseline and at 8-12 weeks  Subjective/Objective:  Nik Nava is a 64 year old male contacted by phone for an initial visit for oral chemotherapy education.        12/11/2024    11:00 AM 12/11/2024     4:00 PM   ORAL CHEMOTHERAPY   Assessment Type Initial Work up New Teach   Diagnosis Code Graft Versus Host Disease (GVHD) - Chronic Graft Versus Host Disease (GVHD) - Chronic   Providers [facility]   Clinic Name/Location [facility]   Is this patient followed by the [facility] OC team? No No   Drug Name Jakafi (ruxolitinib) Jakafi (ruxolitinib)   Dose 5 mg 5 mg   Current Schedule BID BID   Cycle Details Continuous Continuous       Last PHQ-2 Score on record:       12/9/2024     3:21 PM 1/8/2024    10:26 AM   PHQ-2 ( 1999 Pfizer)   Q1: Little interest or pleasure in doing things 0 0   Q2: Feeling down, depressed or hopeless 0 0   PHQ-2 Score 0 0       Vitals:  BP:   BP Readings from Last 1 Encounters:   12/11/24 (!) 140/84     Wt Readings from Last 1 Encounters:   12/11/24 119.6 kg (263 lb 11.2 oz)     Estimated body surface area is 2.47 meters squared as calculated from the following:    Height as of 11/25/24: 1.83 m (6' 0.05\").    Weight as of an earlier encounter on 12/11/24: 119.6 kg (263 lb 11.2 oz).    Labs:  _  Result Component Current Result Ref Range   Sodium 139 (12/11/2024) 135 - 145 mmol/L     _  Result Component Current Result Ref Range   Potassium 4.1 (12/11/2024) 3.4 - 5.3 mmol/L     _  Result Component Current Result Ref Range   Calcium 9.7 (12/11/2024) 8.8 - 10.4 mg/dL     _  Result Component Current Result Ref Range   Magnesium 2.0 (11/28/2024) 1.7 - 2.3 mg/dL     _  Result Component Current Result Ref Range   Phosphorus 3.5 (11/28/2024) 2.5 - 4.5 mg/dL     _  Result Component Current Result Ref Range   Albumin 4.0 (12/11/2024) 3.5 - 5.2 g/dL     _  Result Component Current " Result Ref Range   Urea Nitrogen 29.5 (H) (12/11/2024) 8.0 - 23.0 mg/dL     _  Result Component Current Result Ref Range   Creatinine 1.09 (12/11/2024) 0.67 - 1.17 mg/dL     _  Result Component Current Result Ref Range   AST 32 (12/11/2024) 0 - 45 U/L     _  Result Component Current Result Ref Range   ALT 27 (12/11/2024) 0 - 70 U/L     _  Result Component Current Result Ref Range   Bilirubin Total 0.9 (12/11/2024) <=1.2 mg/dL     _  Result Component Current Result Ref Range   WBC Count 7.9 (12/11/2024) 4.0 - 11.0 10e3/uL     _  Result Component Current Result Ref Range   Hemoglobin 11.3 (L) (12/11/2024) 13.3 - 17.7 g/dL     _  Result Component Current Result Ref Range   Platelet Count 194 (12/11/2024) 150 - 450 10e3/uL     No results found for ANC within last 30 days.     _  Result Component Current Result Ref Range   Absolute Neutrophils 3.5 (12/11/2024) 1.6 - 8.3 10e3/uL        Assessment:  Patient is appropriate to start therapy.    Plan:  Basic chemotherapy teaching was reviewed with the patient and the patient's family including indication, start date of therapy, dose, administration, adverse effects, missed doses, food and drug interactions, monitoring, side effect management, office contact information, and safe handling. Written materials were provided and all questions answered.    Follow-Up:  Oral chemotherapy follow up 12/20  Labs ~12/27 pending scheduling     Kurt Lindsay PharmD  Oral Chemotherapy Monitoring Program  [facility]  [phone]

## 2024-12-12 ENCOUNTER — TELEPHONE (OUTPATIENT)
Dept: PULMONOLOGY | Facility: CLINIC | Age: 64
End: 2024-12-12
Payer: MEDICARE

## 2024-12-12 DIAGNOSIS — D89.813 GVHD AS COMPLICATION OF BONE MARROW TRANSPLANT (H): Primary | ICD-10-CM

## 2024-12-12 DIAGNOSIS — T86.09 GVHD AS COMPLICATION OF BONE MARROW TRANSPLANT (H): Primary | ICD-10-CM

## 2024-12-12 NOTE — TELEPHONE ENCOUNTER
Left Voicemail (1st Attempt) and Sent Mychart (1st Attempt) for the patient to call back and schedule the following:    Appointment type: Return Pulm BMT  Provider: Radha  Return date: 3/10/2025  Specialty phone number: [phone]  Additional appointment(s) needed: Chest CT  Additonal Notes: na

## 2024-12-14 ENCOUNTER — TELEPHONE (OUTPATIENT)
Dept: INFECTIOUS DISEASES | Facility: CLINIC | Age: 64
End: 2024-12-14
Payer: MEDICARE

## 2024-12-14 ENCOUNTER — MYC MEDICAL ADVICE (OUTPATIENT)
Dept: TRANSPLANT | Facility: CLINIC | Age: 64
End: 2024-12-14
Payer: MEDICARE

## 2024-12-14 ASSESSMENT — SLEEP AND FATIGUE QUESTIONNAIRES
HOW LIKELY ARE YOU TO NOD OFF OR FALL ASLEEP WHILE SITTING AND TALKING TO SOMEONE: WOULD NEVER DOZE
HOW LIKELY ARE YOU TO NOD OFF OR FALL ASLEEP WHILE LYING DOWN TO REST IN THE AFTERNOON WHEN CIRCUMSTANCES PERMIT: HIGH CHANCE OF DOZING
HOW LIKELY ARE YOU TO NOD OFF OR FALL ASLEEP WHILE WATCHING TV: SLIGHT CHANCE OF DOZING
HOW LIKELY ARE YOU TO NOD OFF OR FALL ASLEEP WHEN YOU ARE A PASSENGER IN A CAR FOR AN HOUR WITHOUT A BREAK: SLIGHT CHANCE OF DOZING
HOW LIKELY ARE YOU TO NOD OFF OR FALL ASLEEP WHILE SITTING AND READING: MODERATE CHANCE OF DOZING
HOW LIKELY ARE YOU TO NOD OFF OR FALL ASLEEP IN A CAR, WHILE STOPPED FOR A FEW MINUTES IN TRAFFIC: WOULD NEVER DOZE
HOW LIKELY ARE YOU TO NOD OFF OR FALL ASLEEP WHILE SITTING QUIETLY AFTER LUNCH WITHOUT ALCOHOL: SLIGHT CHANCE OF DOZING
HOW LIKELY ARE YOU TO NOD OFF OR FALL ASLEEP WHILE SITTING INACTIVE IN A PUBLIC PLACE: WOULD NEVER DOZE

## 2024-12-14 NOTE — TELEPHONE ENCOUNTER
Brief ID progress note:    Patient of Dr Markham's. 63 yo gentleman with history of Ph+ ALL s/p allo sib PBSCT (8/2021), recurrent oral HSV, GVHD on steroids, s/p recent treatment for PJP pneumonia, finished Atovaquone 12/9. Started PO Bactrim three times per week for prophylaxis on 12/11 (Wednesday)  Now with 1 day of symptoms - slight increase in cough - mostly dry, occasional sputum production  No shortness of breath  No sinus symptoms  Low grade temp x 1 to 99.8F  Vitals stable, not hypoxic - 95% on room air (baseline)  No recent sick contacts - home rapid covid test negative. Patient's wife isn't feeling sick    Recs:  - Advised patient to monitor for symptoms at home - recommended repeating temp and pulse ox 3 times a day  - If fever (temp >100.4F), worsening symptoms (cough or developing SOB), or hypoxia advised to come in to the hospital ER for eval  - Also advised him to inform his BMT team. If symptoms not better by Monday, will benefit from in person clinic visit    DENNY Pena  Staff Physician, Infectious Diseases  Pager [phone]

## 2024-12-16 ENCOUNTER — TELEPHONE (OUTPATIENT)
Dept: TRANSPLANT | Facility: CLINIC | Age: 64
End: 2024-12-16
Payer: MEDICARE

## 2024-12-16 ENCOUNTER — OFFICE VISIT (OUTPATIENT)
Dept: TRANSPLANT | Facility: CLINIC | Age: 64
End: 2024-12-16
Payer: MEDICARE

## 2024-12-16 ENCOUNTER — ALLIED HEALTH/NURSE VISIT (OUTPATIENT)
Dept: TRANSPLANT | Facility: CLINIC | Age: 64
End: 2024-12-16
Payer: MEDICARE

## 2024-12-16 VITALS
RESPIRATION RATE: 16 BRPM | HEART RATE: 105 BPM | DIASTOLIC BLOOD PRESSURE: 66 MMHG | TEMPERATURE: 99.3 F | OXYGEN SATURATION: 96 % | SYSTOLIC BLOOD PRESSURE: 115 MMHG

## 2024-12-16 DIAGNOSIS — D89.813 GVHD AS COMPLICATION OF BONE MARROW TRANSPLANT (H): Primary | ICD-10-CM

## 2024-12-16 DIAGNOSIS — C91.01 ACUTE LYMPHOBLASTIC LEUKEMIA (ALL) IN REMISSION (H): ICD-10-CM

## 2024-12-16 DIAGNOSIS — T86.09 GVHD AS COMPLICATION OF BONE MARROW TRANSPLANT (H): Primary | ICD-10-CM

## 2024-12-16 DIAGNOSIS — Z94.81 S/P BONE MARROW TRANSPLANT (H): ICD-10-CM

## 2024-12-16 DIAGNOSIS — D89.813 GVHD AS COMPLICATION OF BONE MARROW TRANSPLANT (H): ICD-10-CM

## 2024-12-16 DIAGNOSIS — T86.09 GVHD AS COMPLICATION OF BONE MARROW TRANSPLANT (H): ICD-10-CM

## 2024-12-16 DIAGNOSIS — J15.9 BACTERIAL PNEUMONIA: ICD-10-CM

## 2024-12-16 LAB
ALBUMIN SERPL BCG-MCNC: 4 G/DL (ref 3.5–5.2)
ALP SERPL-CCNC: 88 U/L (ref 40–150)
ALT SERPL W P-5'-P-CCNC: 24 U/L (ref 0–70)
ANION GAP SERPL CALCULATED.3IONS-SCNC: 13 MMOL/L (ref 7–15)
AST SERPL W P-5'-P-CCNC: 35 U/L (ref 0–45)
BASOPHILS # BLD AUTO: 0 10E3/UL (ref 0–0.2)
BASOPHILS NFR BLD AUTO: 0 %
BILIRUB SERPL-MCNC: 0.5 MG/DL
BUN SERPL-MCNC: 35.6 MG/DL (ref 8–23)
C PNEUM DNA SPEC QL NAA+PROBE: NOT DETECTED
CALCIUM SERPL-MCNC: 9.4 MG/DL (ref 8.8–10.4)
CHLORIDE SERPL-SCNC: 99 MMOL/L (ref 98–107)
CREAT SERPL-MCNC: 1.35 MG/DL (ref 0.67–1.17)
EGFRCR SERPLBLD CKD-EPI 2021: 58 ML/MIN/1.73M2
EOSINOPHIL # BLD AUTO: 0 10E3/UL (ref 0–0.7)
EOSINOPHIL NFR BLD AUTO: 0 %
ERYTHROCYTE [DISTWIDTH] IN BLOOD BY AUTOMATED COUNT: 17.8 % (ref 10–15)
FLUAV H1 2009 PAND RNA SPEC QL NAA+PROBE: NOT DETECTED
FLUAV H1 RNA SPEC QL NAA+PROBE: NOT DETECTED
FLUAV H3 RNA SPEC QL NAA+PROBE: NOT DETECTED
FLUAV RNA SPEC QL NAA+PROBE: NOT DETECTED
FLUBV RNA SPEC QL NAA+PROBE: NOT DETECTED
GLUCOSE SERPL-MCNC: 103 MG/DL (ref 70–99)
HADV DNA SPEC QL NAA+PROBE: NOT DETECTED
HCO3 SERPL-SCNC: 24 MMOL/L (ref 22–29)
HCOV PNL SPEC NAA+PROBE: NOT DETECTED
HCT VFR BLD AUTO: 33.9 % (ref 40–53)
HGB BLD-MCNC: 11.6 G/DL (ref 13.3–17.7)
HMPV RNA SPEC QL NAA+PROBE: NOT DETECTED
HPIV1 RNA SPEC QL NAA+PROBE: DETECTED
HPIV2 RNA SPEC QL NAA+PROBE: NOT DETECTED
HPIV3 RNA SPEC QL NAA+PROBE: NOT DETECTED
HPIV4 RNA SPEC QL NAA+PROBE: NOT DETECTED
IMM GRANULOCYTES # BLD: 0.1 10E3/UL
IMM GRANULOCYTES NFR BLD: 1 %
LYMPHOCYTES # BLD AUTO: 4.1 10E3/UL (ref 0.8–5.3)
LYMPHOCYTES NFR BLD AUTO: 44 %
M PNEUMO DNA SPEC QL NAA+PROBE: NOT DETECTED
MCH RBC QN AUTO: 32.8 PG (ref 26.5–33)
MCHC RBC AUTO-ENTMCNC: 34.2 G/DL (ref 31.5–36.5)
MCV RBC AUTO: 96 FL (ref 78–100)
MONOCYTES # BLD AUTO: 1.1 10E3/UL (ref 0–1.3)
MONOCYTES NFR BLD AUTO: 12 %
NEUTROPHILS # BLD AUTO: 4 10E3/UL (ref 1.6–8.3)
NEUTROPHILS NFR BLD AUTO: 43 %
NRBC # BLD AUTO: 0 10E3/UL
NRBC BLD AUTO-RTO: 0 /100
PLATELET # BLD AUTO: 135 10E3/UL (ref 150–450)
POTASSIUM SERPL-SCNC: 4.1 MMOL/L (ref 3.4–5.3)
PROT SERPL-MCNC: 6.8 G/DL (ref 6.4–8.3)
RBC # BLD AUTO: 3.54 10E6/UL (ref 4.4–5.9)
RSV RNA SPEC QL NAA+PROBE: NOT DETECTED
RSV RNA SPEC QL NAA+PROBE: NOT DETECTED
RV+EV RNA SPEC QL NAA+PROBE: NOT DETECTED
SARS-COV-2 RNA RESP QL NAA+PROBE: NEGATIVE
SODIUM SERPL-SCNC: 136 MMOL/L (ref 135–145)
WBC # BLD AUTO: 9.3 10E3/UL (ref 4–11)

## 2024-12-16 PROCEDURE — 87633 RESP VIRUS 12-25 TARGETS: CPT

## 2024-12-16 PROCEDURE — 85004 AUTOMATED DIFF WBC COUNT: CPT

## 2024-12-16 PROCEDURE — G2211 COMPLEX E/M VISIT ADD ON: HCPCS

## 2024-12-16 PROCEDURE — G0463 HOSPITAL OUTPT CLINIC VISIT: HCPCS

## 2024-12-16 PROCEDURE — 84460 ALANINE AMINO (ALT) (SGPT): CPT

## 2024-12-16 PROCEDURE — 84155 ASSAY OF PROTEIN SERUM: CPT

## 2024-12-16 PROCEDURE — 87635 SARS-COV-2 COVID-19 AMP PRB: CPT

## 2024-12-16 PROCEDURE — 87040 BLOOD CULTURE FOR BACTERIA: CPT

## 2024-12-16 PROCEDURE — 99215 OFFICE O/P EST HI 40 MIN: CPT

## 2024-12-16 PROCEDURE — 36415 COLL VENOUS BLD VENIPUNCTURE: CPT

## 2024-12-16 PROCEDURE — 36592 COLLECT BLOOD FROM PICC: CPT

## 2024-12-16 RX ORDER — SULFAMETHOXAZOLE AND TRIMETHOPRIM 800; 160 MG/1; MG/1
1 TABLET ORAL
Status: ON HOLD | COMMUNITY

## 2024-12-16 ASSESSMENT — PAIN SCALES - GENERAL: PAINLEVEL_OUTOF10: MILD PAIN (2)

## 2024-12-16 NOTE — NURSING NOTE
Clinic Nursing note:    Covid and respiratory swabs collected. Patient tolerated      Alba Henry RN

## 2024-12-16 NOTE — NURSING NOTE
Clinic Nursing Note:    CMP, CBC, and blood cultures drawn via port.    Blood cultures drawn from left arm.    Tolerated without incident.      Alba Henry RN

## 2024-12-16 NOTE — TELEPHONE ENCOUNTER
BMT Nurse Triage - Upper Respiratory Infection (URI):    Treating Provider:   Dr. Cote    Date of last office visit: 12/11/24    Onset of symptoms: has recent history of PJP, discussed with ID fellow over the weekend (see their documentation).     Duration of symptoms: 3 days, started Friday night/Saturday morning    Any Fever: Yes  - Sat AM 99.1F, no chills, Sunday 100.2F, today 100.6F    Cough? If yes, productive or dry: Yes - dry    Congestion: No     Drainage:  No     Any recent known COVID exposure? If yes, see below for recommendation: No     Any recent COVID testing? Yes - home test was negative x2    SOB? No    Chest Pain? no    If patient has known recent COVID exposure, educate patient to self-monitor for symptoms. If symptomatic, take an at-home COVID test. If asymptomatic, take an at-home COVID test 5 days after exposure. If COVID positive, contact BMT for further guidance.      Nik is also complaining of body aches, some pain around rib area. Feels the same as yesterday. Finished abx (atovaquone) for PJP on 12/9/24 and saw ID/pulm on 12/10/24. Is now back on Bactrim ppx MWF as of 12/11/24.     Paged BMT APPs at 9am for recs. Will bring Nik in for labs and provider visit this afternoon. Nik and Lamar are both very appreciative and will be here for visits.      Jarad Hampton RN Care Coordinator - BMT  Phone: [phone]

## 2024-12-16 NOTE — PROGRESS NOTES
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     Recently with diagnosis of PJP pneumonia. 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    HPI: Nik was added on today. He's been having low grade fevers for 2 days and this morning up to 100.6 however down to 99.3 in clinic without tylenol. Wife is concerned. Cough is dry. Muscle aches and some rib pain started within the last 3 days. Some SOB with exertion-not new. Tired. No n/v/d. Appetite good. Recently finished treatment for PJP pneumonia, finished atovaquone and started PO Bactrim prophy.  Started Jakafi 3 days ago.     ROS: 10 point Review of systems was negative except as detailed above     PHYSICAL EXAM          Blood pressure 115/66, pulse 105, temperature 99.3  F (37.4  C), temperature source Oral, resp. rate 16, SpO2 96%.    Wt Readings from Last 4 Encounters:   12/11/24 119.6 kg (263 lb 11.2 oz)   12/10/24 120.9 kg (266 lb 9 oz)   12/10/24 122.3 kg (269 lb 9.6 oz)   11/28/24 117.9 kg (260 lb)      KPS: 70  General: NAD.  HEENT: sclera anicteric. Lichenoid changes and associated erythema of b/l buccal mucosa.  No active ulceration. No posterior OP redness. No sinus tenderness  Lungs: CTA b/l  no wheezes  CV: RRR     Abd: Soft   Ext/Skin: Chronic skin GVHD change at back-not seen 12/16. Faint scattered erythema lesion. Xerosis.      Chronic GVHD          12/11/2024    09:07 10/27/2024    09:21   Chronic GVHD   Has chronic asafo-ydydnp-ynrx disease developed since the last entry? No No   Is there persistence of chronic rhrhj-yqdlqc-ghyn disease since the last entry? Yes Yes   Chronic Vlszw-Mzadso-Itki Disease Global Severity Score  Moderate   Total Chronic Scxzv-Hlbuod-Kabc Disease Score 2 3   Subjective Clinician Opinion of Severity Mild Mild       Blood Counts     Recent Labs   Lab Test 12/16/24  1226 12/11/24  0719 11/28/24  0352  11/26/24  0330 11/25/24  1744   HGB 11.6* 11.3*  --  12.1* 12.3*   HCT 33.9* 33.2*  --  35.0* 35.5*   WBC 9.3 7.9  --  7.0 7.1   ANEUTAUTO 4.0 3.5  --   --  4.5   ALYMPAUTO 4.1 3.4  --   --  1.9   AMONOAUTO 1.1 0.8  --   --  0.6   AEOSAUTO 0.0 0.1  --   --  0.0   ABSBASO 0.0 0.0  --   --  0.0   NRBCMAN 0.0 0.0  --   --  0.0   * 194 188 219 230       Chemistries     Basic Panel  Recent Labs   Lab Test 12/16/24  1226 12/11/24  0719 11/28/24  0352    139 132*   POTASSIUM 4.1 4.1 4.6   CHLORIDE 99 104 99   CO2 24 27 21*   BUN 35.6* 29.5* 40.5*   CR 1.35* 1.09 1.71*   * 100* 99      Calcium, Magnesium, Phosphorus  Recent Labs   Lab Test 12/11/24  0719 11/28/24  0352 11/27/24  1755 11/27/24  1234 11/27/24  0325 11/26/24  1100 11/26/24  0330   DAMON 9.7 9.7 9.8   < > 9.8   < > 10.5*   MAG  --  2.0  --   --  2.2  --  2.2   PHOS  --  3.5  --   --  4.3  --  4.2    < > = values in this interval not displayed.      LFTs  Recent Labs   Lab Test 12/11/24  0719 11/25/24  1744 11/25/24  1432   BILITOTAL 0.9 0.2 0.2   ALKPHOS 85 102 106   AST 32 40 42   ALT 27 40 41   ALBUMIN 4.0 4.4 4.6     LDH  Recent Labs   Lab Test 04/06/22  0947   *       ASSESSMENT AND PLAN   Nik Nava is a 64 year old male with Ph Pos ALL, day 1224 3y 4mo s/p sib allo stem cell transplant.     1. Acute lymphoblastic leukemia, [place] chromosome positive. S/p VERÓNICA allo sib PBSCT    Most recent restaging at 2Y consistent with MRD negative CR. BM % donor. FISH No evidence of BCR::ABL1 fusion. CG No recipient (male) cells or cells with a t(9;22) or other clonal chromosomal abnormality were detected among the 20 metaphases analyzed.    -BCRABL monitoring quarterly    2. HEME:   Counts recovered, transfusion independent.  Lower today possibly 2/2 virus vs jakafi?    Anemia of chronic disease  Fluctuated during prior infection. Periodically monitor ferritin.     3. GVHD  Skin GVHD- history of biopsy proven GVHD of the skin  8/30/2021; resolved.   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     Ocular GVHD - NIH score 1  Follows with ophthalmology, last visit 10/11/2024.  - Currently using scleral lenses and PF AT. Not on topical steroids now.     Oral GVHD - NIH score 1  Lichenoid change of L buccal mucosa. Adequate pain control.  - Dexamethasone s/s prn    Systemic treatment for GVHD  Corticosteroids - On steroid chronically due to frequent flare of GVH with taper. Recently with GVHD flare while tapered down to 10 mg daily, most recently adjusted to 15 mg daily which is his current dose.  - Continue prednisone 15/10 mg.      GVHD history  cGVHD therapy started on February 24 2022  - Baseline NIH scoring 2/24/2022 : Skin 2 maculopapular rash/erythema with no sclerotic features, mouth score 1 mild symptoms with lichenoid changes less than 25%, eyes score 2 moderate symptoms without new visual impairments due to KCS requiring lubricant eyedrops more than 3 times a day, overall mild scale for her provider     Previous therapies  Tacrolimus - Experienced mild NEGRA, hyperkalemia, hypomagnesemia, and reports some tremors and cramps. Switched to sirolimus Sep 2022.  Sirolimus - Discontinued Oct 2022 due to GVH flare and significant fluid retention and proteinuria.  Belumosudil - Patient intolerant/with disease flares on tacrolimus and sirolimus as above. This was started on Oct 2022 - skin/liver/oral GVHD inactive, and occular symptoms controlled/symptomatic improvement. Discontinued Oct 2023 due to recurrent infections.  Ruxolitinib - Dec 2024: 12/13 started 5 mg twice daily, with a plan to continue in order to taper prednisone down to 5 mg daily or equivalent or below.    4. ID:  12/16: Tm 100.6, resolved without antipyretic. Dry cough. Sent RVP, covid, blood culture. No sick contacts. Recently seen by Dr. Garcia with plan to repeat CT in 3mo and ID-see note.     PJP pneumonia  Recent history of persistent fever with patchy  peribronchovascular groundglass opacities on CT (10/21/2024). Extensive work-up revealed PJP positive from BAL (11/1/2024)  - s/p high-dose bactrim per ID recommendation (11/18/2024 - , plan for 21 days), finished atovaquone 12/9 and now on PO bactrim 3x/week for prophy  - Recommend folic acid supplement while on high-dose bactrim    Rhinovirus infection  Tested positive on 11/18/2024 after he developed fatigue, malaise, URI symptoms.    Prophylaxis   - ACV Patient developed oral herpes reactivation after stopping acyclovir in Jan 2023  - Penicillin 250 mg BID due to atrophic spleen  Hypogammaglobulinemia with recurrent infections: Maintain IgG > 400    Vaccinations  Completed 1Y and 2Y vaccination series.  Received his influenza/COVID vaccine 9/24/2024 and RSV vaccine 8/13.   Complete pneumococcal and HiB vaccine. Recommend meningococcal vaccine once infection resolved.    5. Renal  History of RCC s/p R nephrectomy in 2007.    Elevated Cr  Hyponatremia and hyperkalemia  Elevated Cr at fro baseline around 1.2 with mild hyponatremia and mild hyperkalemia secondary to Bactrim. Given overall stability of Cr, more likely related to decreased Cr excretion from bactrim than actual kidney injury.  - Closely monitor renal function and electrolyte      6. Endo:   - Hx of graves disease; On synthroid follows with endocrine  - HLD; On rosuvastatin    7. CV:   - Hypertension; On amlodipine    8. Psych:   - Situational anxiety; Lexapro 10mg daily.       At recent visit with Dr. Cote on 12/11:  - Taper prednisone 15/10 mg daily  - start TMP/sulfa at prophylaxis dosing  - continue acyclovir, PenVK  - start ruxolitinib 5 mg BID  - BMP weekly x 2    12/16 plan:  RVP, Covid swabs sent  Blood culture  Creatinine 1.3, ok to take Acyclovir 800mg BID  Jakafi could be cause of body aches but could also be viral.   BMP next week as above  Call if spikes fever or gets worse    The longitudinal plan of care for the  diagnosis(es)/condition(s) as documented were addressed during this visit. Due to the added complexity in care, I will continue to support Nik in the subsequent management and with ongoing continuity of care.      40 minutes spent on the date of the encounter doing chart review, history and exam, lab review, documentation and coordniating care.    Patito Mims NP

## 2024-12-16 NOTE — NURSING NOTE
"Oncology Rooming Note    December 16, 2024 12:12 PM   Nik Nava is a 64 year old male who presents for:    Chief Complaint   Patient presents with    Oncology Clinic Visit     RTC fever at home     Initial Vitals: /66   Pulse 105   Temp 99.3  F (37.4  C) (Oral)   Resp 16   SpO2 96%  Estimated body mass index is 35.72 kg/m  as calculated from the following:    Height as of 11/25/24: 1.83 m (6' 0.05\").    Weight as of 12/11/24: 119.6 kg (263 lb 11.2 oz). There is no height or weight on file to calculate BSA.  Mild Pain (2) Comment: Data Unavailable   No LMP for male patient.  Allergies reviewed: Yes  Medications reviewed: Yes    Medications: Medication refills not needed today.  Pharmacy name entered into [employer]:    [facility] - [place], [place] - [address] [facility] [place], [place] - [phone]  [facility] [place], [place] - [address] [place] [address]    Frailty Screening:   Is the patient here for a new oncology consult visit in cancer care? 2. No      Clinical concerns: none       Alba Henry RN              "

## 2024-12-18 ENCOUNTER — HOSPITAL ENCOUNTER (OUTPATIENT)
Facility: CLINIC | Age: 64
Setting detail: OBSERVATION
End: 2024-12-18
Attending: EMERGENCY MEDICINE | Admitting: STUDENT IN AN ORGANIZED HEALTH CARE EDUCATION/TRAINING PROGRAM
Payer: MEDICARE

## 2024-12-18 ENCOUNTER — APPOINTMENT (OUTPATIENT)
Dept: CT IMAGING | Facility: CLINIC | Age: 64
End: 2024-12-18
Attending: PHYSICIAN ASSISTANT
Payer: MEDICARE

## 2024-12-18 DIAGNOSIS — R50.9 FEVER IN ADULT: ICD-10-CM

## 2024-12-18 DIAGNOSIS — D84.9 IMMUNOCOMPROMISED (H): ICD-10-CM

## 2024-12-18 DIAGNOSIS — B34.8 PARAINFLUENZA: ICD-10-CM

## 2024-12-18 LAB
ABO + RH BLD: NORMAL
ALBUMIN SERPL BCG-MCNC: 4.1 G/DL (ref 3.5–5.2)
ALBUMIN UR-MCNC: 70 MG/DL
ALP SERPL-CCNC: 91 U/L (ref 40–150)
ALT SERPL W P-5'-P-CCNC: 23 U/L (ref 0–70)
ANION GAP SERPL CALCULATED.3IONS-SCNC: 14 MMOL/L (ref 7–15)
APPEARANCE UR: CLEAR
APTT PPP: 71 SECONDS (ref 22–38)
AST SERPL W P-5'-P-CCNC: 36 U/L (ref 0–45)
BASOPHILS # BLD AUTO: 0 10E3/UL (ref 0–0.2)
BASOPHILS NFR BLD AUTO: 0 %
BILIRUB SERPL-MCNC: 0.5 MG/DL
BILIRUB UR QL STRIP: NEGATIVE
BLD GP AB SCN SERPL QL: NEGATIVE
BUN SERPL-MCNC: 33.2 MG/DL (ref 8–23)
CALCIUM SERPL-MCNC: 9 MG/DL (ref 8.8–10.4)
CHLORIDE SERPL-SCNC: 102 MMOL/L (ref 98–107)
COLOR UR AUTO: YELLOW
CREAT SERPL-MCNC: 1.55 MG/DL (ref 0.67–1.17)
EGFRCR SERPLBLD CKD-EPI 2021: 49 ML/MIN/1.73M2
EOSINOPHIL # BLD AUTO: 0 10E3/UL (ref 0–0.7)
EOSINOPHIL NFR BLD AUTO: 0 %
ERYTHROCYTE [DISTWIDTH] IN BLOOD BY AUTOMATED COUNT: 17.6 % (ref 10–15)
GLUCOSE SERPL-MCNC: 93 MG/DL (ref 70–99)
GLUCOSE UR STRIP-MCNC: NEGATIVE MG/DL
HCO3 SERPL-SCNC: 22 MMOL/L (ref 22–29)
HCT VFR BLD AUTO: 34.8 % (ref 40–53)
HGB BLD-MCNC: 11.7 G/DL (ref 13.3–17.7)
HGB UR QL STRIP: NEGATIVE
HOLD SPECIMEN: NORMAL
IMM GRANULOCYTES # BLD: 0 10E3/UL
IMM GRANULOCYTES NFR BLD: 0 %
INR PPP: 0.92 (ref 0.85–1.15)
KETONES UR STRIP-MCNC: NEGATIVE MG/DL
L PNEUMO1 AG UR QL IA: NEGATIVE
LACTATE SERPL-SCNC: 0.9 MMOL/L (ref 0.7–2)
LEUKOCYTE ESTERASE UR QL STRIP: NEGATIVE
LYMPHOCYTES # BLD AUTO: 3 10E3/UL (ref 0.8–5.3)
LYMPHOCYTES NFR BLD AUTO: 43 %
MAGNESIUM SERPL-MCNC: 1.7 MG/DL (ref 1.7–2.3)
MCH RBC QN AUTO: 32.7 PG (ref 26.5–33)
MCHC RBC AUTO-ENTMCNC: 33.6 G/DL (ref 31.5–36.5)
MCV RBC AUTO: 97 FL (ref 78–100)
MONOCYTES # BLD AUTO: 0.7 10E3/UL (ref 0–1.3)
MONOCYTES NFR BLD AUTO: 9 %
MUCOUS THREADS #/AREA URNS LPF: PRESENT /LPF
NEUTROPHILS # BLD AUTO: 3.4 10E3/UL (ref 1.6–8.3)
NEUTROPHILS NFR BLD AUTO: 48 %
NITRATE UR QL: NEGATIVE
NRBC # BLD AUTO: 0 10E3/UL
NRBC BLD AUTO-RTO: 0 /100
PH UR STRIP: 6 [PH] (ref 5–7)
PLATELET # BLD AUTO: 96 10E3/UL (ref 150–450)
POTASSIUM SERPL-SCNC: 4.2 MMOL/L (ref 3.4–5.3)
PROCALCITONIN SERPL IA-MCNC: 0.15 NG/ML
PROT SERPL-MCNC: 6.8 G/DL (ref 6.4–8.3)
RBC # BLD AUTO: 3.58 10E6/UL (ref 4.4–5.9)
RBC URINE: 1 /HPF
S PNEUM AG SPEC QL: NEGATIVE
SODIUM SERPL-SCNC: 138 MMOL/L (ref 135–145)
SP GR UR STRIP: 1.03 (ref 1–1.03)
SPECIMEN EXP DATE BLD: NORMAL
SPECIMEN TYPE: NORMAL
UROBILINOGEN UR STRIP-MCNC: NORMAL MG/DL
WBC # BLD AUTO: 7.1 10E3/UL (ref 4–11)
WBC URINE: 1 /HPF

## 2024-12-18 PROCEDURE — 87086 URINE CULTURE/COLONY COUNT: CPT | Performed by: PHYSICIAN ASSISTANT

## 2024-12-18 PROCEDURE — 83605 ASSAY OF LACTIC ACID: CPT | Performed by: EMERGENCY MEDICINE

## 2024-12-18 PROCEDURE — 83735 ASSAY OF MAGNESIUM: CPT | Performed by: EMERGENCY MEDICINE

## 2024-12-18 PROCEDURE — 250N000011 HC RX IP 250 OP 636: Performed by: EMERGENCY MEDICINE

## 2024-12-18 PROCEDURE — 85610 PROTHROMBIN TIME: CPT | Performed by: PHYSICIAN ASSISTANT

## 2024-12-18 PROCEDURE — 81001 URINALYSIS AUTO W/SCOPE: CPT | Performed by: EMERGENCY MEDICINE

## 2024-12-18 PROCEDURE — 206N000001 HC R&B BMT UMMC

## 2024-12-18 PROCEDURE — 36591 DRAW BLOOD OFF VENOUS DEVICE: CPT | Performed by: EMERGENCY MEDICINE

## 2024-12-18 PROCEDURE — 36415 COLL VENOUS BLD VENIPUNCTURE: CPT | Performed by: EMERGENCY MEDICINE

## 2024-12-18 PROCEDURE — 258N000003 HC RX IP 258 OP 636: Performed by: EMERGENCY MEDICINE

## 2024-12-18 PROCEDURE — 36591 DRAW BLOOD OFF VENOUS DEVICE: CPT | Performed by: PHYSICIAN ASSISTANT

## 2024-12-18 PROCEDURE — 71250 CT THORAX DX C-: CPT | Mod: MF

## 2024-12-18 PROCEDURE — 250N000013 HC RX MED GY IP 250 OP 250 PS 637: Performed by: PHYSICIAN ASSISTANT

## 2024-12-18 PROCEDURE — 87040 BLOOD CULTURE FOR BACTERIA: CPT | Performed by: EMERGENCY MEDICINE

## 2024-12-18 PROCEDURE — 87899 AGENT NOS ASSAY W/OPTIC: CPT | Performed by: PHYSICIAN ASSISTANT

## 2024-12-18 PROCEDURE — 85730 THROMBOPLASTIN TIME PARTIAL: CPT | Performed by: PHYSICIAN ASSISTANT

## 2024-12-18 PROCEDURE — 84145 PROCALCITONIN (PCT): CPT | Performed by: EMERGENCY MEDICINE

## 2024-12-18 PROCEDURE — 250N000013 HC RX MED GY IP 250 OP 250 PS 637: Performed by: STUDENT IN AN ORGANIZED HEALTH CARE EDUCATION/TRAINING PROGRAM

## 2024-12-18 PROCEDURE — 250N000011 HC RX IP 250 OP 636: Performed by: STUDENT IN AN ORGANIZED HEALTH CARE EDUCATION/TRAINING PROGRAM

## 2024-12-18 PROCEDURE — G1010 CDSM STANSON: HCPCS | Performed by: RADIOLOGY

## 2024-12-18 PROCEDURE — 85025 COMPLETE CBC W/AUTO DIFF WBC: CPT | Performed by: EMERGENCY MEDICINE

## 2024-12-18 PROCEDURE — 71250 CT THORAX DX C-: CPT | Mod: 26 | Performed by: RADIOLOGY

## 2024-12-18 PROCEDURE — 258N000003 HC RX IP 258 OP 636: Performed by: PHYSICIAN ASSISTANT

## 2024-12-18 PROCEDURE — 86900 BLOOD TYPING SEROLOGIC ABO: CPT | Performed by: PHYSICIAN ASSISTANT

## 2024-12-18 PROCEDURE — 82040 ASSAY OF SERUM ALBUMIN: CPT | Performed by: EMERGENCY MEDICINE

## 2024-12-18 PROCEDURE — 82784 ASSAY IGA/IGD/IGG/IGM EACH: CPT | Performed by: PHYSICIAN ASSISTANT

## 2024-12-18 RX ORDER — ESCITALOPRAM OXALATE 10 MG/1
10 TABLET ORAL DAILY
Status: DISPENSED | OUTPATIENT
Start: 2024-12-18

## 2024-12-18 RX ORDER — SULFAMETHOXAZOLE AND TRIMETHOPRIM 800; 160 MG/1; MG/1
1 TABLET ORAL
Status: ACTIVE | OUTPATIENT
Start: 2024-12-20

## 2024-12-18 RX ORDER — ACETAMINOPHEN 325 MG/1
325-650 TABLET ORAL EVERY 4 HOURS PRN
Status: DISCONTINUED | OUTPATIENT
Start: 2024-12-18 | End: 2024-12-18

## 2024-12-18 RX ORDER — HEPARIN SODIUM,PORCINE 10 UNIT/ML
5-10 VIAL (ML) INTRAVENOUS
Status: ACTIVE | OUTPATIENT
Start: 2024-12-18

## 2024-12-18 RX ORDER — ROSUVASTATIN CALCIUM 5 MG/1
5 TABLET, COATED ORAL EVERY EVENING
Status: DISPENSED | OUTPATIENT
Start: 2024-12-18

## 2024-12-18 RX ORDER — PROCHLORPERAZINE MALEATE 5 MG/1
5 TABLET ORAL EVERY 6 HOURS PRN
Status: ACTIVE | OUTPATIENT
Start: 2024-12-18

## 2024-12-18 RX ORDER — HEPARIN SODIUM (PORCINE) LOCK FLUSH IV SOLN 100 UNIT/ML 100 UNIT/ML
5-10 SOLUTION INTRAVENOUS
Status: ACTIVE | OUTPATIENT
Start: 2024-12-18

## 2024-12-18 RX ORDER — LEVOTHYROXINE SODIUM 112 UG/1
112 TABLET ORAL DAILY
Status: DISPENSED | OUTPATIENT
Start: 2024-12-19

## 2024-12-18 RX ORDER — PREDNISONE 5 MG/1
15 TABLET ORAL EVERY OTHER DAY
Status: DISCONTINUED | OUTPATIENT
Start: 2024-12-19 | End: 2024-12-18

## 2024-12-18 RX ORDER — PANTOPRAZOLE SODIUM 40 MG/1
40 TABLET, DELAYED RELEASE ORAL
Status: DISPENSED | OUTPATIENT
Start: 2024-12-19

## 2024-12-18 RX ORDER — HEPARIN SODIUM,PORCINE 10 UNIT/ML
5-10 VIAL (ML) INTRAVENOUS EVERY 24 HOURS
Status: DISPENSED | OUTPATIENT
Start: 2024-12-18

## 2024-12-18 RX ORDER — PREDNISONE 5 MG/1
15 TABLET ORAL EVERY OTHER DAY
Status: ACTIVE | OUTPATIENT
Start: 2024-12-21

## 2024-12-18 RX ORDER — PREDNISONE 10 MG/1
10 TABLET ORAL EVERY OTHER DAY
Status: ACTIVE | OUTPATIENT
Start: 2024-12-22

## 2024-12-18 RX ORDER — CARBOXYMETHYLCELLULOSE SODIUM 5 MG/ML
1 SOLUTION/ DROPS OPHTHALMIC 4 TIMES DAILY
Status: DISPENSED | OUTPATIENT
Start: 2024-12-18

## 2024-12-18 RX ORDER — ACYCLOVIR 400 MG/1
800 TABLET ORAL EVERY 12 HOURS
Status: DISPENSED | OUTPATIENT
Start: 2024-12-18

## 2024-12-18 RX ORDER — CEFEPIME HYDROCHLORIDE 2 G/1
2 INJECTION, POWDER, FOR SOLUTION INTRAVENOUS EVERY 12 HOURS
Status: DISCONTINUED | OUTPATIENT
Start: 2024-12-18 | End: 2024-12-19

## 2024-12-18 RX ORDER — PREDNISONE 5 MG/1
10 TABLET ORAL EVERY OTHER DAY
Status: DISCONTINUED | OUTPATIENT
Start: 2024-12-20 | End: 2024-12-18

## 2024-12-18 RX ORDER — CEFEPIME HYDROCHLORIDE 2 G/1
2 INJECTION, POWDER, FOR SOLUTION INTRAVENOUS EVERY 8 HOURS
Status: DISCONTINUED | OUTPATIENT
Start: 2024-12-18 | End: 2024-12-18

## 2024-12-18 RX ORDER — LORAZEPAM 2 MG/ML
.5-1 INJECTION INTRAMUSCULAR EVERY 4 HOURS PRN
Status: DISCONTINUED | OUTPATIENT
Start: 2024-12-18 | End: 2024-12-18

## 2024-12-18 RX ORDER — LORAZEPAM 1 MG/1
1 TABLET ORAL EVERY 6 HOURS PRN
Status: DISPENSED | OUTPATIENT
Start: 2024-12-18

## 2024-12-18 RX ORDER — SENNOSIDES 8.6 MG
1 TABLET ORAL 3 TIMES DAILY PRN
Status: ACTIVE | OUTPATIENT
Start: 2024-12-18

## 2024-12-18 RX ORDER — DEXTROSE MONOHYDRATE AND SODIUM CHLORIDE 5; .45 G/100ML; G/100ML
INJECTION, SOLUTION INTRAVENOUS CONTINUOUS
Status: DISCONTINUED | OUTPATIENT
Start: 2024-12-18 | End: 2024-12-19

## 2024-12-18 RX ORDER — CEFEPIME HYDROCHLORIDE 2 G/1
2 INJECTION, POWDER, FOR SOLUTION INTRAVENOUS
Status: ACTIVE | OUTPATIENT
Start: 2024-12-18

## 2024-12-18 RX ORDER — PENICILLIN V POTASSIUM 250 MG/1
250 TABLET, FILM COATED ORAL 2 TIMES DAILY
Status: ACTIVE | OUTPATIENT
Start: 2024-12-18

## 2024-12-18 RX ORDER — LORAZEPAM 0.5 MG/1
.5-1 TABLET ORAL EVERY 4 HOURS PRN
Status: DISCONTINUED | OUTPATIENT
Start: 2024-12-18 | End: 2024-12-18

## 2024-12-18 RX ADMIN — SODIUM CHLORIDE 1000 ML: 9 INJECTION, SOLUTION INTRAVENOUS at 10:12

## 2024-12-18 RX ADMIN — CARBOXYMETHYLCELLULOSE SODIUM 1 DROP: 5 SOLUTION/ DROPS OPHTHALMIC at 20:05

## 2024-12-18 RX ADMIN — ACYCLOVIR 800 MG: 400 TABLET ORAL at 20:06

## 2024-12-18 RX ADMIN — CEFEPIME HYDROCHLORIDE 2 G: 2 INJECTION, POWDER, FOR SOLUTION INTRAVENOUS at 14:09

## 2024-12-18 RX ADMIN — DEXTROSE AND SODIUM CHLORIDE: 5; 450 INJECTION, SOLUTION INTRAVENOUS at 14:09

## 2024-12-18 RX ADMIN — ESCITALOPRAM OXALATE 10 MG: 10 TABLET ORAL at 20:06

## 2024-12-18 RX ADMIN — ROSUVASTATIN CALCIUM 5 MG: 5 TABLET, FILM COATED ORAL at 20:06

## 2024-12-18 RX ADMIN — HEPARIN, PORCINE (PF) 10 UNIT/ML INTRAVENOUS SYRINGE 5 ML: at 11:37

## 2024-12-18 ASSESSMENT — ACTIVITIES OF DAILY LIVING (ADL)
TOILETING_ISSUES: NO
ADLS_ACUITY_SCORE: 56
FALL_HISTORY_WITHIN_LAST_SIX_MONTHS: NO
ADLS_ACUITY_SCORE: 33
DRESSING/BATHING_DIFFICULTY: NO
ADLS_ACUITY_SCORE: 56
DOING_ERRANDS_INDEPENDENTLY_DIFFICULTY: NO
ADLS_ACUITY_SCORE: 33
ADLS_ACUITY_SCORE: 56
ADLS_ACUITY_SCORE: 33
ADLS_ACUITY_SCORE: 33
WALKING_OR_CLIMBING_STAIRS_DIFFICULTY: NO
ADLS_ACUITY_SCORE: 33
HEARING_DIFFICULTY_OR_DEAF: NO
ADLS_ACUITY_SCORE: 56
CHANGE_IN_FUNCTIONAL_STATUS_SINCE_ONSET_OF_CURRENT_ILLNESS/INJURY: NO
ADLS_ACUITY_SCORE: 33
ADLS_ACUITY_SCORE: 56
DIFFICULTY_EATING/SWALLOWING: NO
VISION_MANAGEMENT: READING GLASSES
ADLS_ACUITY_SCORE: 33
CONCENTRATING,_REMEMBERING_OR_MAKING_DECISIONS_DIFFICULTY: NO
ADLS_ACUITY_SCORE: 33
ADLS_ACUITY_SCORE: 56
WEAR_GLASSES_OR_BLIND: YES
DIFFICULTY_COMMUNICATING: NO

## 2024-12-18 ASSESSMENT — COLUMBIA-SUICIDE SEVERITY RATING SCALE - C-SSRS
1. IN THE PAST MONTH, HAVE YOU WISHED YOU WERE DEAD OR WISHED YOU COULD GO TO SLEEP AND NOT WAKE UP?: NO
2. HAVE YOU ACTUALLY HAD ANY THOUGHTS OF KILLING YOURSELF IN THE PAST MONTH?: NO
6. HAVE YOU EVER DONE ANYTHING, STARTED TO DO ANYTHING, OR PREPARED TO DO ANYTHING TO END YOUR LIFE?: NO

## 2024-12-18 NOTE — PROGRESS NOTES
..Patient admitted to:unit 5C   Admitted from: ED   Arrived by:litter   Reason for admission:fevers  Patient accompanied by:spouse   Belongings:with patient in the room   Teaching:oriented to room and nursing routines   Skin double check completed by:

## 2024-12-18 NOTE — ED PROVIDER NOTES
[facility] ([facility])    12/18/24       ED PROVIDER NOTE   ED 28  History     Chief Complaint   Patient presents with    Fever     The history is provided by the patient and medical records.     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 He took 1000mg tylenol at 0545. He is on Jakafi 5mg BID anti-rejection, trying to wean off prednisone. He is currently on prednisone 15/10 mg. The patient currently denies any symptoms of headache, chest pain, shortness of breath, abdominal pain, nausea, vomiting or diarrhea. He has had occasional cough and by chart review, patient was diagnosed with parainfluenza by viral swab on 12/16/2024.     Past Medical History  Past Medical History:   Diagnosis Date    ALL (acute lymphocytic leukemia) (H)     CKD (chronic kidney disease)     GVHD (graft versus host disease) (H)     H/O peripheral stem cell transplant (H)     Hyperthyroidism 1996    Infection due to 2019 novel coronavirus 01/16/2023    Infection due to 2019 novel coronavirus 05/18/2022    Influenza A 11/2022    Postablative hypothyroidism 1997    Pulmonary embolism (H)     Renal cell carcinoma (H) 2007    right kidney    Sleep apnea      Past Surgical History:   Procedure Laterality Date    APPENDECTOMY      BACK SURGERY  2017    BONE MARROW BIOPSY, BONE SPECIMEN, NEEDLE/TROCAR Left 09/02/2021    Procedure: BIOPSY, BONE MARROW;  Surgeon: Jailyn Ny APRN CNP;  Location: [facility] OR    BONE MARROW BIOPSY, BONE SPECIMEN, NEEDLE/TROCAR Left 11/15/2021    Procedure: BIOPSY, BONE MARROW;  Surgeon: Socrates De La Torre;  Location: [facility] OR    BONE MARROW BIOPSY, BONE SPECIMEN, NEEDLE/TROCAR Left 02/07/2022    Procedure: BIOPSY, BONE MARROW;   Surgeon: Renetta Wiggins PA-C;  Location: [facility] OR    BONE MARROW BIOPSY, BONE SPECIMEN, NEEDLE/TROCAR Left 08/18/2022    Procedure: BIOPSY, BONE MARROW;  Surgeon: Lorrie Yap PA-C;  Location: [facility] OR    BONE MARROW BIOPSY, BONE SPECIMEN, NEEDLE/TROCAR Left 08/07/2023    Procedure: Bone marrow biopsy, bone specimen, needle/trocar;  Surgeon: Teressa Rick PA-C;  Location: [facility] OR    BRONCHOSCOPY (RIGID OR FLEXIBLE), DIAGNOSTIC N/A 04/29/2022    Procedure: BRONCHOSCOPY, WITH BRONCHOALVEOLAR LAVAGE;  Surgeon: Murtaza Garcia MD;  Location: [facility]    BRONCHOSCOPY (RIGID OR FLEXIBLE), DIAGNOSTIC N/A 11/1/2024    Procedure: BRONCHOSCOPY, WITH BRONCHOALVEOLAR LAVAGE;  Surgeon: Murtaza Garcia MD;  Location: [facility]    IR CVC TUNNEL PLACEMENT > 5 YRS OF AGE  08/04/2021    IR CVC TUNNEL REMOVAL LEFT  09/02/2021    IR LIVER BIOPSY PERCUTANEOUS  02/21/2022    IR LUMBAR PUNCTURE  1/21/2021    NEPHRECTOMY  2007    ORTHOPEDIC SURGERY      bilateral shoulder surgeries    PERCUTANEOUS BIOPSY LIVER N/A 02/21/2022    Procedure: NEEDLE BIOPSY, LIVER, PERCUTANEOUS;  Surgeon: Trace Castellanos MD;  Location: [facility] OR    PHACOEMULSIFICATION WITH STANDARD INTRAOCULAR LENS IMPLANT Left 1/15/2024    Procedure: LEFT EYE PHACOEMULSIFICATION, CATARACT, WITH STANDARD INTRAOCULAR LENS IMPLANT INSERTION;  Surgeon: Deshawn Menezes MD;  Location: [facility] OR    PHACOEMULSIFICATION WITH STANDARD INTRAOCULAR LENS IMPLANT Right 2/5/2024    Procedure: RIGHT EYE PHACOEMULSIFICATION, CATARACT, WITH STANDARD INTRAOCULAR LENS IMPLANT INSERTION;  Surgeon: Deshawn Menezes MD;  Location: [facility] OR     No current outpatient medications on file.    No Known Allergies  Family History  Family History   Problem Relation Age of Onset    Lung Cancer Mother     Depression Mother     Polycythemia Father     Anesthesia Reaction Father         slow to awaken    Coronary Artery Disease Maternal Grandmother     Diabetes Maternal Grandmother      "Hypertension Maternal Grandmother     Hypothyroidism Sister     Glaucoma No family hx of     Macular Degeneration No family hx of     Venous thrombosis No family hx of     Melanoma No family hx of     Skin Cancer No family hx of      Social History   Social History     Tobacco Use    Smoking status: Former     Current packs/day: 0.00     Average packs/day: 2.0 packs/day for 30.0 years (60.0 ttl pk-yrs)     Types: Cigarettes     Start date: 1989     Quit date: 2019     Years since quittin.6     Passive exposure: Past    Smokeless tobacco: Never    Tobacco comments:     DAILY USE OF NICORETTE GUM   Vaping Use    Vaping status: Never Used   Substance Use Topics    Alcohol use: Not Currently    Drug use: Never      Past medical history, past surgical history, medications, allergies, family history, and social history were reviewed with the patient. No additional pertinent items.   A medically appropriate review of systems was performed with pertinent positives and negatives noted in the HPI, and all other systems negative.    Physical Exam   BP: 116/73  Pulse: 109  Temp: 99.2  F (37.3  C)  Resp: 18  Height: 185.4 cm (6' 1\")  Weight: 113.4 kg (250 lb)  SpO2: 96 %    Physical Exam  Vitals and nursing note reviewed.   Constitutional:       General: He is not in acute distress.     Appearance: Normal appearance. He is not toxic-appearing.   HENT:      Head: Atraumatic.   Eyes:      General: No scleral icterus.     Conjunctiva/sclera: Conjunctivae normal.   Cardiovascular:      Rate and Rhythm: Normal rate.      Heart sounds: Normal heart sounds.   Pulmonary:      Effort: Pulmonary effort is normal. No respiratory distress.      Breath sounds: Normal breath sounds.   Abdominal:      Palpations: Abdomen is soft.      Tenderness: There is no abdominal tenderness.   Musculoskeletal:         General: No deformity.      Cervical back: Neck supple.   Skin:     General: Skin is warm.   Neurological:      General: No " focal deficit present.      Mental Status: He is alert and oriented to person, place, and time.   Psychiatric:         Mood and Affect: Mood normal.         Behavior: Behavior normal.           ED Course, Procedures, & Data     ED Course as of 12/19/24 1344   Wed Dec 18, 2024   1007 Blood pressure on monitor is reading 66/56, rechecked patient at bedside     Procedures           IV Antibiotics given and/or elevated Lactate of 0 and no sepsis note found - Delete this reminder and enter the sepsis note or '.edcms' before signing chart.>>>     Results for orders placed or performed during the hospital encounter of 12/18/24   XR Chest 2 Views     Status: None    Narrative    Exam: XR CHEST 2 VIEWS, 12/18/2024 9:35 AM    Indication: fever, immunosuppressed    Comparison: Chest x-ray 1/24/2024    Findings:     Subtle patchy nodular opacity in the right upper zone on frontal  projection. Stable right chest wall portacatheter. Stable cardiac  mediastinal silhouette. No discernible pneumothorax or significant  pleural effusion. Stable cardiomediastinal silhouette. Similar mild  streaky perihilar opacities.      Impression    Impression:  1. Subtle patchy nodular opacity in the right upper zone on frontal  projection, infiltrate not excluded, consider attention on short-term  follow-up or CT chest for further evaluation.  2. Similar mild perihilar streaky opacities, nonspecific may represent  atelectasis or edema.    RHONDA FINNEGAN MD         SYSTEM ID:  [id number]   CT Chest w/o Contrast     Status: None    Narrative    CT chest without contrast    INDICATION: Abnormal chest x-ray. Hypoxia. Known  parainfluenza.    COMPARISON: Plain films earlier today. CT chest 12/9/2024    FINES: No contrast. Right sided approach right IJ port catheter tip  near the SVC/right atrial junction. Heart size normal. Thoracic aorta  and main pulmonary artery are both normal in caliber. No pleural or  pericardial effusion. The included upper  abdomen shows changes of  right nephrectomy. Left adrenal and the upper portion of the left  kidney appear normal. No suspicious upper abdominal mass.  Calcifications throughout the splenic artery as well as in the  abdominal aorta.  Bone detail shows osteopenia with mild degenerative changes in the  spine. No suspicious sclerotic or lytic/destructive lesion.    Detail of the lungs shows mild incompletely patchy groundglass  opacities in the upper lobe slightly more dense patchy opacities in  the posterior inferior right upper lobe as well as the superior  segment of the right lower lobe. No right middle lobe involvement.  Airways thickening in the lower lobes may indicate some reactive  airway disease. No solid pulmonary nodules of suspicion. Comparison  with previous shows the patchy/consolidative and other groundglass  scattered opacities to be new findings concerning for atypical  infection versus other inflammation. No ectopic air in the chest.  Major central airways appear nicely patent.      Impression    IMPRESSION: New scattered mostly right sided patchy/groundglass/near  consolidative opacities concerning for atypical infection versus other  inflammation. New from 9 days ago. Atherosclerosis including coronary  artery disease.    ANTONIA CHILDERS MD         SYSTEM ID:  [id number]   Comprehensive metabolic panel     Status: Abnormal   Result Value Ref Range    Sodium 138 135 - 145 mmol/L    Potassium 4.2 3.4 - 5.3 mmol/L    Carbon Dioxide (CO2) 22 22 - 29 mmol/L    Anion Gap 14 7 - 15 mmol/L    Urea Nitrogen 33.2 (H) 8.0 - 23.0 mg/dL    Creatinine 1.55 (H) 0.67 - 1.17 mg/dL    GFR Estimate 49 (L) >60 mL/min/1.73m2    Calcium 9.0 8.8 - 10.4 mg/dL    Chloride 102 98 - 107 mmol/L    Glucose 93 70 - 99 mg/dL    Alkaline Phosphatase 91 40 - 150 U/L    AST 36 0 - 45 U/L    ALT 23 0 - 70 U/L    Protein Total 6.8 6.4 - 8.3 g/dL    Albumin 4.1 3.5 - 5.2 g/dL    Bilirubin Total 0.5 <=1.2 mg/dL   Magnesium      Status: Normal   Result Value Ref Range    Magnesium 1.7 1.7 - 2.3 mg/dL   UA with Microscopic reflex to Culture     Status: Abnormal    Specimen: Urine, Midstream   Result Value Ref Range    Color Urine Yellow Colorless, Straw, Light Yellow, Yellow    Appearance Urine Clear Clear    Glucose Urine Negative Negative mg/dL    Bilirubin Urine Negative Negative    Ketones Urine Negative Negative mg/dL    Specific Gravity Urine 1.026 1.003 - 1.035    Blood Urine Negative Negative    pH Urine 6.0 5.0 - 7.0    Protein Albumin Urine 70 (A) Negative mg/dL    Urobilinogen Urine Normal Normal, 2.0 mg/dL    Nitrite Urine Negative Negative    Leukocyte Esterase Urine Negative Negative    Mucus Urine Present (A) None Seen /LPF    RBC Urine 1 <=2 /HPF    WBC Urine 1 <=5 /HPF    Narrative    Urine Culture not indicated   CBC with platelets and differential     Status: Abnormal   Result Value Ref Range    WBC Count 7.1 4.0 - 11.0 10e3/uL    RBC Count 3.58 (L) 4.40 - 5.90 10e6/uL    Hemoglobin 11.7 (L) 13.3 - 17.7 g/dL    Hematocrit 34.8 (L) 40.0 - 53.0 %    MCV 97 78 - 100 fL    MCH 32.7 26.5 - 33.0 pg    MCHC 33.6 31.5 - 36.5 g/dL    RDW 17.6 (H) 10.0 - 15.0 %    Platelet Count 96 (L) 150 - 450 10e3/uL    % Neutrophils 48 %    % Lymphocytes 43 %    % Monocytes 9 %    % Eosinophils 0 %    % Basophils 0 %    % Immature Granulocytes 0 %    NRBCs per 100 WBC 0 <1 /100    Absolute Neutrophils 3.4 1.6 - 8.3 10e3/uL    Absolute Lymphocytes 3.0 0.8 - 5.3 10e3/uL    Absolute Monocytes 0.7 0.0 - 1.3 10e3/uL    Absolute Eosinophils 0.0 0.0 - 0.7 10e3/uL    Absolute Basophils 0.0 0.0 - 0.2 10e3/uL    Absolute Immature Granulocytes 0.0 <=0.4 10e3/uL    Absolute NRBCs 0.0 10e3/uL   [place] Draw     Status: None    Narrative    The following orders were created for panel order [place] Draw.  Procedure                               Abnormality         Status                     ---------                               -----------         ------                      Extra Blue Top Tube[[id number]]                              Final result               Extra Red Top Tube[[id number]]                               Final result                 Please view results for these tests on the individual orders.   Extra Blue Top Tube     Status: None   Result Value Ref Range    Hold Specimen JIC    Extra Red Top Tube     Status: None   Result Value Ref Range    Hold Specimen JIC    [place] Draw     Status: None    Narrative    The following orders were created for panel order [place] Draw.  Procedure                               Abnormality         Status                     ---------                               -----------         ------                     Extra Green Top (Lithium...[[id number]]                      Final result                 Please view results for these tests on the individual orders.   Extra Green Top (Lithium Heparin) ON ICE     Status: None   Result Value Ref Range    Hold Specimen JIC    Procalcitonin     Status: Normal   Result Value Ref Range    Procalcitonin 0.15 <0.50 ng/mL   Lactic acid whole blood     Status: Normal   Result Value Ref Range    Lactic Acid 0.9 0.7 - 2.0 mmol/L   INR     Status: Normal   Result Value Ref Range    INR 0.92 0.85 - 1.15   Partial thromboplastin time     Status: Abnormal   Result Value Ref Range    aPTT 71 (H) 22 - 38 Seconds   IgG     Status: Abnormal   Result Value Ref Range    Immunoglobulin G 534 (L) 610 - 1,616 mg/dL   Basic metabolic panel     Status: Abnormal   Result Value Ref Range    Sodium 135 135 - 145 mmol/L    Potassium 3.9 3.4 - 5.3 mmol/L    Chloride 102 98 - 107 mmol/L    Carbon Dioxide (CO2) 20 (L) 22 - 29 mmol/L    Anion Gap 13 7 - 15 mmol/L    Urea Nitrogen 27.8 (H) 8.0 - 23.0 mg/dL    Creatinine 1.51 (H) 0.67 - 1.17 mg/dL    GFR Estimate 51 (L) >60 mL/min/1.73m2    Calcium 8.7 (L) 8.8 - 10.4 mg/dL    Glucose 102 (H) 70 - 99 mg/dL   CBC with platelets and differential     Status: Abnormal    Result Value Ref Range    WBC Count 5.2 4.0 - 11.0 10e3/uL    RBC Count 3.31 (L) 4.40 - 5.90 10e6/uL    Hemoglobin 10.9 (L) 13.3 - 17.7 g/dL    Hematocrit 32.0 (L) 40.0 - 53.0 %    MCV 97 78 - 100 fL    MCH 32.9 26.5 - 33.0 pg    MCHC 34.1 31.5 - 36.5 g/dL    RDW 17.4 (H) 10.0 - 15.0 %    Platelet Count 86 (L) 150 - 450 10e3/uL    % Neutrophils 39 %    % Lymphocytes 49 %    % Monocytes 12 %    % Eosinophils 0 %    % Basophils 0 %    % Immature Granulocytes 1 %    NRBCs per 100 WBC 0 <1 /100    Absolute Neutrophils 2.0 1.6 - 8.3 10e3/uL    Absolute Lymphocytes 2.5 0.8 - 5.3 10e3/uL    Absolute Monocytes 0.6 0.0 - 1.3 10e3/uL    Absolute Eosinophils 0.0 0.0 - 0.7 10e3/uL    Absolute Basophils 0.0 0.0 - 0.2 10e3/uL    Absolute Immature Granulocytes 0.0 <=0.4 10e3/uL    Absolute NRBCs 0.0 10e3/uL   Magnesium     Status: Normal   Result Value Ref Range    Magnesium 1.7 1.7 - 2.3 mg/dL   Phosphorus     Status: Normal   Result Value Ref Range    Phosphorus 2.5 2.5 - 4.5 mg/dL   Adult Type and Screen     Status: None   Result Value Ref Range    ABO/RH(D) O POS     Antibody Screen Negative Negative    SPECIMEN EXPIRATION DATE [id number]    Blood Culture Arm, Left     Status: Normal (Preliminary result)    Specimen: Arm, Left; Blood   Result Value Ref Range    Culture No growth after 1 day    Urine Culture     Status: None    Specimen: Urine, Midstream   Result Value Ref Range    Culture <10,000 CFU/mL Mixture of Urogenital Annmarie    Legionella Urinary Antigen and Streptococcus pneumoniae antigen     Status: None    Specimen: Urine, Midstream   Result Value Ref Range    Legionella pneumophila serogroup 1 urinary antigen Negative Negative    Streptococcus pneumoniae antigen Negative Negative    Legionella pneumophila Urinary/Strep pneumoniae Antigen Specimen Type Urine     Narrative    The result of this test as well as culture, serology, or other antigen detection methods should be used in conjunction with clinical  findings to make an accurate diagnosis.   CBC with platelets differential     Status: Abnormal    Narrative    The following orders were created for panel order CBC with platelets differential.  Procedure                               Abnormality         Status                     ---------                               -----------         ------                     CBC with platelets and d...[[id number]]  Abnormal            Final result               RBC and Platelet Morphology[[id number]]                                                   Please view results for these tests on the individual orders.   CBC with platelets differential     Status: Abnormal    Narrative    The following orders were created for panel order CBC with platelets differential.  Procedure                               Abnormality         Status                     ---------                               -----------         ------                     CBC with platelets and d...[[id number]]  Abnormal            Final result                 Please view results for these tests on the individual orders.   ABO/RH Type and Screen      Status: None    Narrative    The following orders were created for panel order ABO/RH Type and Screen .  Procedure                               Abnormality         Status                     ---------                               -----------         ------                     Adult Type and Screen[[id number]]                            Final result                 Please view results for these tests on the individual orders.     Medications   sodium chloride (PF) 0.9% PF flush 10-20 mL (has no administration in time range)   sodium chloride (PF) 0.9% PF flush 10-20 mL (has no administration in time range)   sodium chloride (PF) 0.9% PF flush 10-20 mL (10 mLs Intracatheter $Given 12/18/24 1137)   heparin lock flush 10 unit/mL injection 5-10 mL (has no administration in time range)   heparin lock flush 10 unit/mL  injection 5-10 mL ( Intracatheter Not Given 12/19/24 0900)   heparin lock flush 100 unit/mL injection 5-10 mL ( Intracatheter Not Given 12/18/24 0952)   prochlorperazine (COMPAZINE) injection 5 mg (has no administration in time range)     Or   prochlorperazine (COMPAZINE) tablet 5 mg (has no administration in time range)   ceFEPIme (MAXIPIME) 2 g vial to attach to  mL bag for ADULTS or NS 50 mL bag for PEDS (has no administration in time range)   acyclovir (ZOVIRAX) tablet 800 mg (800 mg Oral $Given 12/19/24 0812)   escitalopram (LEXAPRO) tablet 10 mg (10 mg Oral $Given 12/18/24 2006)   levothyroxine (SYNTHROID/LEVOTHROID) tablet 112 mcg (112 mcg Oral $Given 12/19/24 0812)   LORazepam (ATIVAN) tablet 1 mg (1 mg Oral $Given 12/19/24 0245)   nicotine (NICORETTE) gum 4 mg (has no administration in time range)   penicillin V (VEETID) tablet 250 mg ( Oral Automatically Held 12/24/24 2000)   rosuvastatin (CRESTOR) tablet 5 mg (5 mg Oral $Given 12/18/24 2006)   ruxolitinib (JAKAFI) tablet 5 mg (5 mg Oral $Given 12/19/24 0852)   sennosides (SENOKOT) tablet 1 tablet (has no administration in time range)   sulfamethoxazole-trimethoprim (BACTRIM DS) 800-160 MG per tablet 1 tablet (has no administration in time range)   carboxymethylcellulose PF (REFRESH PLUS) 0.5 % ophthalmic solution 1 drop (1 drop Both Eyes Not Given 12/19/24 1156)   pantoprazole (PROTONIX) EC tablet 40 mg (40 mg Oral $Given 12/19/24 0812)   predniSONE (DELTASONE) tablet 15 mg (has no administration in time range)   predniSONE (DELTASONE) tablet 10 mg (has no administration in time range)   predniSONE (DELTASONE) tablet 30 mg (30 mg Oral $Given 12/19/24 0812)   levofloxacin (LEVAQUIN) tablet 750 mg (750 mg Oral $Given 12/19/24 1059)   acetaminophen (TYLENOL) tablet 650 mg (650 mg Oral $Given 12/19/24 0542)   sodium chloride 0.9% BOLUS 1,000 mL (0 mLs Intravenous Stopped 12/18/24 1210)     Labs Ordered and Resulted from Time of ED Arrival to Time of ED  Departure   COMPREHENSIVE METABOLIC PANEL - Abnormal       Result Value    Sodium 138      Potassium 4.2      Carbon Dioxide (CO2) 22      Anion Gap 14      Urea Nitrogen 33.2 (*)     Creatinine 1.55 (*)     GFR Estimate 49 (*)     Calcium 9.0      Chloride 102      Glucose 93      Alkaline Phosphatase 91      AST 36      ALT 23      Protein Total 6.8      Albumin 4.1      Bilirubin Total 0.5     ROUTINE UA WITH MICROSCOPIC REFLEX TO CULTURE - Abnormal    Color Urine Yellow      Appearance Urine Clear      Glucose Urine Negative      Bilirubin Urine Negative      Ketones Urine Negative      Specific Gravity Urine 1.026      Blood Urine Negative      pH Urine 6.0      Protein Albumin Urine 70 (*)     Urobilinogen Urine Normal      Nitrite Urine Negative      Leukocyte Esterase Urine Negative      Mucus Urine Present (*)     RBC Urine 1      WBC Urine 1     CBC WITH PLATELETS AND DIFFERENTIAL - Abnormal    WBC Count 7.1      RBC Count 3.58 (*)     Hemoglobin 11.7 (*)     Hematocrit 34.8 (*)     MCV 97      MCH 32.7      MCHC 33.6      RDW 17.6 (*)     Platelet Count 96 (*)     % Neutrophils 48      % Lymphocytes 43      % Monocytes 9      % Eosinophils 0      % Basophils 0      % Immature Granulocytes 0      NRBCs per 100 WBC 0      Absolute Neutrophils 3.4      Absolute Lymphocytes 3.0      Absolute Monocytes 0.7      Absolute Eosinophils 0.0      Absolute Basophils 0.0      Absolute Immature Granulocytes 0.0      Absolute NRBCs 0.0     MAGNESIUM - Normal    Magnesium 1.7     PROCALCITONIN - Normal    Procalcitonin 0.15     LACTIC ACID WHOLE BLOOD - Normal    Lactic Acid 0.9     INR - Normal    INR 0.92     TYPE AND SCREEN, ADULT    ABO/RH(D) O POS      Antibody Screen Negative      SPECIMEN EXPIRATION DATE [id number]     C. DIFFICILE TOXIN B PCR WITH REFLEX TO C. DIFFICILE ANTIGEN AND TOXINS A/B EIA   VANCOMYCIN RESISTANT ENTEROCOCCUS CULTURE (VRE)     CT Chest w/o Contrast   Final Result   IMPRESSION: New  scattered mostly right sided patchy/groundglass/near   consolidative opacities concerning for atypical infection versus other   inflammation. New from 9 days ago. Atherosclerosis including coronary   artery disease.      ANTONIA CHILDERS MD            SYSTEM ID:  [id number]      XR Chest 2 Views   Final Result   Impression:   1. Subtle patchy nodular opacity in the right upper zone on frontal   projection, infiltrate not excluded, consider attention on short-term   follow-up or CT chest for further evaluation.   2. Similar mild perihilar streaky opacities, nonspecific may represent   atelectasis or edema.      RHONDA FINNEGAN MD            SYSTEM ID:  [id number]             Critical care was not performed.     Medical Decision Making  The patient's presentation was of high complexity (an acute health issue posing potential threat to life or bodily function).    The patient's evaluation involved:  review of external note(s) from 1 sources (see separate area of note for details)  review of 3+ test result(s) ordered prior to this encounter (see separate area of note for details)  ordering and/or review of 3+ test(s) in this encounter (see separate area of note for details)  discussion of management or test interpretation with another health professional (BMT fellow)    The patient's management necessitated high risk (a decision regarding hospitalization).    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  Patient is afebrile on arrival though he did take an antipyretic prior to coming in, overall not acutely toxic appearing however while in the emergency department he did appear to be extremely orthostatic with normal blood pressures when lying flat  and fairly severe hypotension when sitting up.  He was given IV fluids.  Lab work obtained and patient discussed with the bone marrow transplant team who recommended admission to their service.  Chest x-ray obtained and did appear consistent with infiltrate concerning for possible pneumonia on my read, however the BMT fellow said to hold off on antibiotics at this time as this may be related to parainfluenza and they will pursue antibiotics if needed. Patient admitted for further care.     I have reviewed the nursing notes. I have reviewed the findings, diagnosis, plan and need for follow up with the patient.    Current Discharge Medication List          Final diagnoses:   Fever in adult   Immunocompromised (H)   Parainfluenza       Jose David Goddard MD   [facility] EMERGENCY DEPARTMENT  12/18/2024        Jose David Goddard MD  12/19/24 [id number]

## 2024-12-18 NOTE — H&P
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    HPI: Nik is currently recovering from both PJP pneumonia and rhinovirus.  Unfortunately, he has now developed parainfluenza URI.  His fevers were trending up at home, despite use of tylenol, and so his wife brought him to the ED.  There, he was noted to be febrile and had mild hypoxia (88-90% sporadically) and hypotensive down to a systolic in the 50s.  He felt unwell at the time.  His blood pressures improved to the low normal range very quickly, but he was given a liter of normal saline anyway. He denies any dizzy feeling when standing up after getting fluid; however, his wife points out that despite getting a full liter of fluid, he has yet to urinate.     He has stable eye/mouth GVHD.  He has a cough.  He isn't particularly SOB, and he is not hypoxic upon arrival to .    ROS: as above, otherwise negative 10 point ROS    Blood Counts Recent Labs   Lab Test 12/18/24  0758 12/16/24  1226 12/11/24  0719   HGB 11.7* 11.6* 11.3*   HCT 34.8* 33.9* 33.2*   WBC 7.1 9.3 7.9   ANEUTAUTO 3.4 4.0 3.5   ALYMPAUTO 3.0 4.1 3.4   AMONOAUTO 0.7 1.1 0.8   AEOSAUTO 0.0 0.0 0.1   ABSBASO 0.0 0.0 0.0   NRBCMAN 0.0 0.0 0.0   PLT 96* 135* 194      Blood Type Recent Labs   Lab Test 12/18/24  0758   ABORH O POS        Chemistries Recent Labs   Lab Test 12/18/24  0758 12/16/24  1226 12/11/24  0719    136 139    POTASSIUM 4.2 4.1 4.1   CHLORIDE 102 99 104   CO2 22 24 27   BUN 33.2* 35.6* 29.5*   CR 1.55* 1.35* 1.09   GLC 93 103* 100*       Liver Tests Recent Labs   Lab Test 12/18/24  0758 12/16/24  1226 12/11/24  0719   BILITOTAL 0.5 0.5 0.9   ALKPHOS 91 88 85   AST 36 35 32   ALT 23 24 27   ALBUMIN 4.1 4.0 4.0      Chest X-Ray: Results for orders placed during the hospital encounter of 12/18/24    XR CHEST 2 VIEWS    Status: Normal 12/18/2024    Narrative  Exam: XR CHEST 2 VIEWS, 12/18/2024 9:35 AM    Indication: fever, immunosuppressed    Comparison: Chest x-ray 1/24/2024    Findings:    Subtle patchy nodular opacity in the right upper zone on frontal  projection. Stable right chest wall portacatheter. Stable cardiac  mediastinal silhouette. No discernible pneumothorax or significant  pleural effusion. Stable cardiomediastinal silhouette. Similar mild  streaky perihilar opacities.    Impression  Impression:  1. Subtle patchy nodular opacity in the right upper zone on frontal  projection, infiltrate not excluded, consider attention on short-term  follow-up or CT chest for further evaluation.  2. Similar mild perihilar streaky opacities, nonspecific may represent  atelectasis or edema.    RHONDA FINNEGAN MD      SYSTEM ID:  [id number]       Chest CT Results for orders placed in visit on 12/09/24    CT CHEST W/O CONTRAST    Status: Normal 12/9/2024    Narrative  CT chest without contrast    INDICATION: Pulmonary nodule evaluation.    COMPARISON: Chest CT pulmonary angiogram 11/21/2024    FINDINGS: No contrast. Right-sided approach port catheter tip at the  SVC/right atrial junction. Aortic and coronary atherosclerotic  calcifications. Heart size normal. Pulmonary artery caliber normal.  Thoracic aortic caliber upper normal just under 4 cm in its mid  ascending portion. No enlarged lymph nodes.  Wide common origin of the right brachiocephalic and left common  carotid arteries from the aortic arch.  Included upper abdomen  shows apparent right nephrectomy and  adrenalectomy. Left renal cyst anteriorly mostly exophytic. Abdominal  aortic an right greater than left renal artery atherosclerotic  calcifications. Moderate amount of normal-appearing colon occupies the  right renal fossa in this nephrectomy patient.    Bone detail minimal degenerative changes in the upper thoracic spine.  No suspicious sclerotic or lytic/destructive lesion.    Detail of the lungs shows vague nondescript bilateral upper lobe  groundglass opacities both unchanged and superior segment right lower  lobe nonconfluent groundglass opacity unchanged. Punctate calcified  right lower lobe granuloma unchanged. No new nodules or other  opacities. No ectopic air collections. Major central airways appear  nicely patent.    Impression  IMPRESSION: Unchanged nonconfluent groundglass opacities in the lung  apices and superior segment right lower lobe in the short interval.  Follow-up in 3 months recommended to document clearing. Prior right  nephrectomy and adrenalectomy. Atherosclerosis.    ANTONIA CHILDERS MD      SYSTEM ID:  [id number]     PFTs FVC%  Recent Labs   Lab Test 09/21/23  1246 09/20/22  1237 03/01/22  0707 07/12/21  1125   [id number] 99 108 112 101       FEV1%  Recent Labs   Lab Test 09/21/23  1246 09/20/22  1237 03/01/22  0707 07/12/21  1125   77260 91 99 104 98       DLCO%  Recent Labs   Lab Test 09/20/22  1237 03/01/22  0707 07/12/21  1125   02250 142 123 141        EKG ECG results from 11/25/24   EKG 12-lead, tracing only     Value    Systolic Blood Pressure     Diastolic Blood Pressure     Ventricular Rate 78    Atrial Rate 78    WY Interval 160    QRS Duration 88        QTc 444    P Axis 75    R AXIS 40    T Axis 75    Interpretation ECG      Sinus rhythm  Normal ECG  Unconfirmed report - interpretation of this ECG is computer generated - see medical record for final interpretation    Confirmed by - EMERGENCY ROOM, PHYSICIAN ([id number]),  WICHO MCKEON  ([id number]) on 11/26/2024 9:36:27 AM     EKG 12-lead, tracing only     Value    Systolic Blood Pressure     Diastolic Blood Pressure     Ventricular Rate 82    Atrial Rate 82    NV Interval 168    QRS Duration 90        QTc 446    P Axis 72    R AXIS 23    T Axis 79    Interpretation ECG      Sinus rhythm  Normal ECG  When compared with ECG of 25-Nov-2024 17:49,  No significant change was found  Confirmed by MD SALBADOR, FRANCIS ([id number]) on 11/27/2024 4:08:07 PM       *Note: Due to a large number of results and/or encounters for the requested time period, some results have not been displayed. A complete set of results can be found in Results Review.      Serologies: CMV:   Recent Labs   Lab Test 07/08/21  1055   CMVIGG >8.0*     EBV:   Recent Labs   Lab Test 07/08/21  1055   EBVCAG 7.5*     HSV:   Recent Labs   Lab Test 07/08/21  1055   H1IGG >8.0*   H2IGG <0.2     VZV: No lab results found.  HTLV: No lab results found.   Vitamin D Recent Labs   Lab Test 10/25/24  1303   VITDT 40      [facility] IDMs Recent Labs   Lab Test 07/08/21  1055   [id number] Non-Reactive        Other Lab Results:     COVID:  Recent Labs   Lab Test 12/16/24  1226   [id number] Negative       LDH  Recent Labs   Lab Test 04/06/22  0947   *       Creatinine Clearance  Recent Labs   Lab Test 11/26/24  0554   UCRR 57.1       Immunoglobulins:   Recent Labs   Lab Test 10/25/24  1303 10/18/24  1042 10/15/24  1202 09/09/24  0951 08/05/24  0939 07/03/24  1100   * 574* 587* 661 542* 535*       Urine Studies  Recent Labs   Lab Test 12/18/24  0926   COLOR Yellow   APPEARANCE Clear   URINEGLC Negative   URINEBILI Negative   URINEKETONE Negative   SG 1.026   UBLD Negative   URINEPH 6.0   PROTEIN 70*   UUROI Normal   NITRITE Negative   LEUKEST Negative   MUCUS Present*   RBCU 1   WBCU 1       I have assessed all abnormal lab values for their clinical significance and any values considered clinically significant have been addressed in the  assessment and plan    Family History:   Family History   Problem Relation Age of Onset    Lung Cancer Mother     Depression Mother     Polycythemia Father     Anesthesia Reaction Father         slow to awaken    Coronary Artery Disease Maternal Grandmother     Diabetes Maternal Grandmother     Hypertension Maternal Grandmother     Hypothyroidism Sister     Glaucoma No family hx of     Macular Degeneration No family hx of     Venous thrombosis No family hx of     Melanoma No family hx of     Skin Cancer No family hx of        Social History:   Social History     Socioeconomic History    Marital status:      Spouse name: Not on file    Number of children: Not on file    Years of education: Not on file    Highest education level: Not on file   Occupational History    Not on file   Tobacco Use    Smoking status: Former     Current packs/day: 0.00     Average packs/day: 2.0 packs/day for 30.0 years (60.0 ttl pk-yrs)     Types: Cigarettes     Start date: 1989     Quit date: 2019     Years since quittin.6     Passive exposure: Past    Smokeless tobacco: Never    Tobacco comments:     DAILY USE OF NICORETTE GUM   Vaping Use    Vaping status: Never Used   Substance and Sexual Activity    Alcohol use: Not Currently    Drug use: Never    Sexual activity: Not on file   Other Topics Concern    Parent/sibling w/ CABG, MI or angioplasty before 65F 55M? Not Asked   Social History Narrative    Not on file     Social Drivers of Health     Financial Resource Strain: Low Risk  (2024)    Financial Resource Strain     Within the past 12 months, have you or your family members you live with been unable to get utilities (heat, electricity) when it was really needed?: No   Food Insecurity: Low Risk  (2024)    Food Insecurity     Within the past 12 months, did you worry that your food would run out before you got money to buy more?: No     Within the past 12 months, did the food you bought just not last and  you didn t have money to get more?: No   Transportation Needs: Low Risk  (11/27/2024)    Transportation Needs     Within the past 12 months, has lack of transportation kept you from medical appointments, getting your medicines, non-medical meetings or appointments, work, or from getting things that you need?: No   Physical Activity: Not on file   Stress: Not on file   Social Connections: Socially Integrated (5/30/2024)    [facility]    Social Connections     Do you often feel lonely or isolated from those around you?: 0   Interpersonal Safety: Low Risk  (11/25/2024)    Interpersonal Safety     Do you feel physically and emotionally safe where you currently live?: Yes     Within the past 12 months, have you been hit, slapped, kicked or otherwise physically hurt by someone?: No     Within the past 12 months, have you been humiliated or emotionally abused in other ways by your partner or ex-partner?: No   Housing Stability: Low Risk  (11/27/2024)    Housing Stability     Do you have housing? : Yes     Are you worried about losing your housing?: No   Recent Concern: Housing Stability - High Risk (11/27/2024)    Housing Stability     Do you have housing? : No     Are you worried about losing your housing?: No       Past Medical History:   Past Medical History:   Diagnosis Date    ALL (acute lymphocytic leukemia) (H)     CKD (chronic kidney disease)     GVHD (graft versus host disease) (H)     H/O peripheral stem cell transplant (H)     Hyperthyroidism 1996    Infection due to 2019 novel coronavirus 01/16/2023    Infection due to 2019 novel coronavirus 05/18/2022    Influenza A 11/2022    Postablative hypothyroidism 1997    Pulmonary embolism (H)     Renal cell carcinoma (H) 2007    right kidney    Sleep apnea         Past Surgical History:   Past Surgical History:   Procedure Laterality Date    APPENDECTOMY      BACK SURGERY  2017    BONE MARROW BIOPSY, BONE SPECIMEN,  NEEDLE/TROCAR Left 09/02/2021    Procedure: BIOPSY, BONE MARROW;  Surgeon: aJilyn Ny APRN CNP;  Location: [facility] OR    BONE MARROW BIOPSY, BONE SPECIMEN, NEEDLE/TROCAR Left 11/15/2021    Procedure: BIOPSY, BONE MARROW;  Surgeon: Socrates De La Torre;  Location: [facility] OR    BONE MARROW BIOPSY, BONE SPECIMEN, NEEDLE/TROCAR Left 02/07/2022    Procedure: BIOPSY, BONE MARROW;  Surgeon: Renetta Wiggins PA-C;  Location: [facility] OR    BONE MARROW BIOPSY, BONE SPECIMEN, NEEDLE/TROCAR Left 08/18/2022    Procedure: BIOPSY, BONE MARROW;  Surgeon: Lorrie Yap PA-C;  Location: [facility] OR    BONE MARROW BIOPSY, BONE SPECIMEN, NEEDLE/TROCAR Left 08/07/2023    Procedure: Bone marrow biopsy, bone specimen, needle/trocar;  Surgeon: Teressa Rick PA-C;  Location: [facility] OR    BRONCHOSCOPY (RIGID OR FLEXIBLE), DIAGNOSTIC N/A 04/29/2022    Procedure: BRONCHOSCOPY, WITH BRONCHOALVEOLAR LAVAGE;  Surgeon: Murtaza Garcia MD;  Location: [facility]    BRONCHOSCOPY (RIGID OR FLEXIBLE), DIAGNOSTIC N/A 11/1/2024    Procedure: BRONCHOSCOPY, WITH BRONCHOALVEOLAR LAVAGE;  Surgeon: Murtaza Garcia MD;  Location: [facility]    IR CVC TUNNEL PLACEMENT > 5 YRS OF AGE  08/04/2021    IR CVC TUNNEL REMOVAL LEFT  09/02/2021    IR LIVER BIOPSY PERCUTANEOUS  02/21/2022    IR LUMBAR PUNCTURE  1/21/2021    NEPHRECTOMY  2007    ORTHOPEDIC SURGERY      bilateral shoulder surgeries    PERCUTANEOUS BIOPSY LIVER N/A 02/21/2022    Procedure: NEEDLE BIOPSY, LIVER, PERCUTANEOUS;  Surgeon: Trace Castellanos MD;  Location: [facility] OR    PHACOEMULSIFICATION WITH STANDARD INTRAOCULAR LENS IMPLANT Left 1/15/2024    Procedure: LEFT EYE PHACOEMULSIFICATION, CATARACT, WITH STANDARD INTRAOCULAR LENS IMPLANT INSERTION;  Surgeon: Deshawn Menezes MD;  Location: [facility] OR    PHACOEMULSIFICATION WITH STANDARD INTRAOCULAR LENS IMPLANT Right 2/5/2024    Procedure: RIGHT EYE PHACOEMULSIFICATION, CATARACT, WITH STANDARD INTRAOCULAR LENS IMPLANT INSERTION;  Surgeon:  Deshawn Menezes MD;  Location: [facility] OR       Allergies: No Known Allergies    Home Medications      Prior to Admission medications    Medication Sig Start Date End Date Taking? Authorizing Provider   acyclovir (ZOVIRAX) 400 MG tablet Take 400 mg by mouth every 12 hours. Taking 800 mg BID 11/27/24   Jayro Koehler MD   Carboxymethylcell-Glycerin PF (REFRESH RELIEVA PF) 0.5-1 % SOLN Apply 1 drop to eye 4 times daily Preservative free. Either PF multidose bottle or PF vials 2/10/22   Aisha Juarez, ADILENE   dexAMETHasone alcohol-free (DECADRON) 0.1 MG/ML solution Swish and spit 10 mLs (1 mg) in mouth 2 times daily.  Patient not taking: Reported on 12/16/2024 9/6/24   Chris Cote MD   escitalopram (LEXAPRO) 10 MG tablet Take 1 tablet (10 mg) by mouth daily 7/7/23   Akshat Bearden MD   fluorometholone (FML LIQUIFILM) 0.1 % ophthalmic suspension Place 1 drop Into the left eye 3 times daily.  Patient not taking: Reported on 12/10/2024 8/26/24   Aisha Juarez, ADILENE   levothyroxine (SYNTHROID/LEVOTHROID) 112 MCG tablet Take 1 tablet (112 mcg) by mouth daily. 12/9/24   Natalia Gray MD   LORazepam (ATIVAN) 1 MG tablet Take 1 mg by mouth every 6 hours as needed for anxiety. PRN    Reported, Patient   metroNIDAZOLE (METROCREAM) 0.75 % external cream Apply topically 2 times daily Apply to the nose. 5/17/24   Zoe Wesley PA-C   nicotine polacrilex (NICORETTE) 4 MG gum Place 4 mg inside cheek daily.    Reported, Patient   Nutritional Supplements (ENSURE COMPLETE SHAKE) LIQD Take 11 mLs by mouth every morning    Reported, Patient   omeprazole (PRILOSEC) 40 MG DR capsule Take 1 capsule (40 mg) by mouth daily 10/10/22   Akshat Bearden MD   penicillin V (VEETID) 250 MG tablet Take 1 tablet (250 mg) by mouth 2 times daily. 12/5/24   Chris Cote MD   predniSONE (DELTASONE) 10 MG tablet Take 1 tablet (10 mg) by mouth daily  Patient taking differently: Take 10 mg by mouth daily. Alternates  "10 and 15 mg every other day while on Jakafi 8/5/24   Chris Cote MD   predniSONE (DELTASONE) 5 MG tablet Take 1 tablet (5 mg) by mouth daily. Currently taking 15 mg daily ( on taper) 9/30/24   Chris Cote MD   rosuvastatin (CRESTOR) 5 MG tablet Take 1 tablet (5 mg) by mouth daily. 11/27/24   Chris Cote MD   ruxolitinib (JAKAFI) 5 MG TABS tablet Take 1 tablet (5 mg) by mouth 2 times daily 12/12/24 1/11/25  Chris Cote MD   sennosides (SENOKOT) 8.6 MG tablet Take 1 tablet by mouth 3 times daily as needed for constipation 3/4/22   Layla Spencer PA-C   sodium chloride 0.9 % neb solution 3 mLs by Other route 2 times daily 6/28/23   Aisha Juarez OD   sulfamethoxazole-trimethoprim (BACTRIM DS) 800-160 MG tablet Take 1 tablet by mouth. Taking 1 a day on Monday/Wednesday/Friday    Reported, Patient   tacrolimus (PROTOPIC) 0.1 % external ointment Apply topically 2 times daily 3/14/24   Akshat Bearden MD       PHYSICAL EXAM      Weight     Wt Readings from Last 3 Encounters:   12/18/24 113.4 kg (250 lb)   12/11/24 119.6 kg (263 lb 11.2 oz)   12/10/24 120.9 kg (266 lb 9 oz)        KPS: 70    /80   Pulse 79   Temp 98  F (36.7  C) (Oral)   Resp 18   Ht 1.854 m (6' 1\")   Wt 113.4 kg (250 lb)   SpO2 94%   BMI 32.98 kg/m       General: NAD   Eyes: YARELIS, sclera anicteric   Nose/Mouth/Throat: flat, lichenoid changes throughout without open lesions  Lungs: CTA bilaterally; breathing comfortably on room air; O2 sat 97% on room air  Cardiovascular: RRR, no M/R/G   Abdominal/Rectal: +BS, soft, NT, ND  Lymphatics: No edema; wearing compression socks  Skin: No rashes or petechaie  Neuro: A&O   Musculoskeletal: Muscle mass adequate  Additional Findings: PAC accessed      LABS AND IMAGING: I have assessed all abnormal lab values for their clinical significance and any values considered clinically significant have been addressed in the assessment and plan.        Lab Results   Component " Value Date    WBC 7.1 12/18/2024    ANEUTAUTO 3.4 12/18/2024    HGB 11.7 (L) 12/18/2024    HCT 34.8 (L) 12/18/2024    PLT 96 (L) 12/18/2024     12/18/2024    POTASSIUM 4.2 12/18/2024    CHLORIDE 102 12/18/2024    CO2 22 12/18/2024    GLC 93 12/18/2024    BUN 33.2 (H) 12/18/2024    CR 1.55 (H) 12/18/2024    MAG 1.7 12/18/2024    INR 0.92 12/18/2024           SYSTEMS-BASED ASSESSMENT AND PLAN   Nik Nava is a 64 year old male with Ph Pos ALL, day + 1226  s/p sib allo stem cell transplant.     Acute lymphoblastic leukemia, [place] chromosome positive. S/p VERÓNICA allo sib PBSCT   2Y restage consistent with MRD negative CR. BM % donor. FISH No evidence of BCR::ABL1 fusion. CG No recipient (male) cells or cells with a t(9;22) or other clonal chromosomal abnormality were detected among the 20 metaphases analyzed.    BCRABL monitoring quarterly     HEME:   Counts dropping.  Ddx: jakafi vs infectio  Anemia of chronic disease  Not currently needing transfusions, but will keep hgb > 7 and plts > 10k if needed.      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      Ocular GVHD - NIH score 1  Follows with ophthalmology, last visit 10/11/2024.  - Currently using scleral lenses and PF AT.       Oral GVHD - NIH score 1  Lichenoid change of L buccal mucosa. Adequate pain control.  - Dexamethasone s/s prn; patient states he has been told to use this only when necessary, so he isn't currently using this.  Order if needed.      Systemic treatment for GVHD  Corticosteroids - On steroid chronically due to frequent flare of GVH with taper. Current dose prior to admission was 15mg every other day alternating with 10mg every other day.  Patient noted to be hypotensive in ED, possibly septic.  Increase pred to 30mg daily x 3 days as stress dose steroids (12/18-20), then resume previous dosing.      GVHD history  cGVHD therapy started on  February 24 2022  - Baseline NIH scoring 2/24/2022    Skin 2 maculopapular rash/erythema with no sclerotic features  Mouth score 1     Previous therapies  Tacrolimus - Experienced mild NEGRA, hyperkalemia, hypomagnesemia, and reports some tremors and cramps. Switched to sirolimus Sep 2022.  Sirolimus - Discontinued Oct 2022 due to GVH flare and significant fluid retention and proteinuria.  Belumosudil - Patient intolerant/with disease flares on tacrolimus and sirolimus as above. This was started on Oct 2022 - skin/liver/oral GVHD inactive, and occular symptoms controlled/symptomatic improvement. Discontinued Oct 2023 due to recurrent infections.  Ruxolitinib - Dec 2024: 12/13 started 5 mg twice daily, with a plan to continue in order to taper prednisone down to 5 mg daily or equivalent or below.     ID:   Immunocompromised secondary to GVHD medications  Parainfluenza virus:  supportive cares.  CXR abnormal, getting CT chest.  Note that findings could relate to recent PJP pna, see below.     PJP pneumonia on BAL (11/1/2024)  - s/p high-dose bactrim per ID recommendation (11/18/2024 - , plan for 21 days), finished atovaquone 12/9 and now on PO bactrim 3x/week for prophy     Rhinovirus infection 11/18/2024     Non-neutropenic fevers:  blood culture, urine culture, sputum culture, CT chest as above; start cefepime while awaiting culture results.     Hypogammaglobulinemia with recurrent infections: check IgG on admission and replete if needed, given frequency of infections.    Prophylaxis   - ACV Patient developed oral herpes reactivation after stopping acyclovir in Jan 2023  - Penicillin 250 mg BID due to atrophic spleen (on hold with cefepime)       Vaccinations  Completed 1Y and 2Y vaccination series.  Received his influenza/COVID vaccine 9/24/2024 and RSV vaccine 8/13.   Complete pneumococcal and HiB vaccine. Recommend meningococcal vaccine once infection resolved.     Renal  History of RCC s/p R nephrectomy in  "2007.    CKD:  patient/wife state this is medication related and started since his transplant.  Creatinine had been around 1.3, but thalia to a new baseline of 1.8-2 with addition of high dose Bactrim.  Monitor for improvement now that he is no longer on high dose Bactrim.      Hydration: 1L NS in ED; 100ml/hour of D51/2NS ongoing      Endo:   - Hx of graves disease; On synthroid follows with endocrine    CV:   - Hypertension; On amlodipine  - HLD; On rosuvastatin     Psych:   - Situational anxiety; Lexapro 10mg daily.  Ativan 1mg po q 6 hours prn anxiety.        # Pain Assessment:      12/18/2024     9:50 AM   Current Pain Score   Patient currently in pain? yes   Nik lowe pain level was assessed and he currently denies pain.      Code Status: Full Code   Medically Ready for Discharge: Anticipated in 2-4 Days    Clinically Significant Risk Factors Present on Admission                 # Thrombocytopenia: Lowest platelets = 96 in last 2 days, will monitor for bleeding   # Hypertension: Noted on problem list           # Obesity: Estimated body mass index is 34.51 kg/m  as calculated from the following:    Height as of this encounter: 1.854 m (6' 1\").    Weight as of this encounter: 118.7 kg (261 lb 9.6 oz).       # Financial/Environmental Concerns:         I spent 60 minutes in the care of this patient today, which included time necessary for preparation for the visit, obtaining history, ordering medications/tests/procedures as medically indicated, review of pertinent medical literature, counseling of the patient, communication of recommendations to the care team, and documentation time.      Teressa Rick PA-C      Securely message with the Vocera Web Console     "

## 2024-12-18 NOTE — PHARMACY-ADMISSION MEDICATION HISTORY
Pharmacist Admission Medication History    Admission medication history is complete. The information provided in this note is only as accurate as the sources available at the time of the update.    Information Source(s): Clinic records and CareEverywhere/SureScripts via N/A    Pertinent Information: Seen in BMT clinic 12/16, RN marked last doses. Currently alternating between 10 mg and 15 mg prednisone daily, took 10 mg this morning.    Changes made to PTA medication list:  Added: None  Deleted: None  Changed:   Acyclovir from 400 mg BID to 800 mg BID  Updated Bactrim directions ([facility])    Allergies reviewed with patient and updates made in EHR: no    Medication History Completed By: Francois Brenner [facility] 12/18/2024 2:40 PM    PTA Med List   Medication Sig Last Dose/Taking    acyclovir (ZOVIRAX) 400 MG tablet Take 800 mg by mouth every 12 hours. Taking 800 mg BID 12/18/2024 at  9:00 AM    Carboxymethylcell-Glycerin PF (REFRESH RELIEVA PF) 0.5-1 % SOLN Apply 1 drop to eye 4 times daily Preservative free. Either PF multidose bottle or PF vials 12/17/2024 at  8:30 PM    dexAMETHasone alcohol-free (DECADRON) 0.1 MG/ML solution Swish and spit 10 mLs (1 mg) in mouth 2 times daily. Past Week    escitalopram (LEXAPRO) 10 MG tablet Take 1 tablet (10 mg) by mouth daily 12/17/2024 at  8:30 PM    fluorometholone (FML LIQUIFILM) 0.1 % ophthalmic suspension Place 1 drop Into the left eye 3 times daily. More than a month    levothyroxine (SYNTHROID/LEVOTHROID) 112 MCG tablet Take 1 tablet (112 mcg) by mouth daily. 12/18/2024 at  9:00 AM    LORazepam (ATIVAN) 1 MG tablet Take 1 mg by mouth every 6 hours as needed for anxiety. PRN 12/16/2024    nicotine polacrilex (NICORETTE) 4 MG gum Place 4 mg inside cheek daily. 12/18/2024 at 12:00 PM    Nutritional Supplements (ENSURE COMPLETE SHAKE) LIQD Take 11 mLs by mouth every morning 12/17/2024 at  8:00 AM    omeprazole (PRILOSEC) 40 MG DR capsule Take 1 capsule (40 mg) by mouth daily  12/18/2024 at  9:00 AM    penicillin V (VEETID) 250 MG tablet Take 1 tablet (250 mg) by mouth 2 times daily. 12/18/2024 at  9:00 AM    predniSONE (DELTASONE) 10 MG tablet Take 1 tablet (10 mg) by mouth daily (Patient taking differently: Take 10 mg by mouth daily. Alternates 10 and 15 mg every other day while on Jakafi) 12/18/2024 at  8:00 AM    predniSONE (DELTASONE) 5 MG tablet Take 1 tablet (5 mg) by mouth daily. Currently taking 15 mg daily ( on taper) 12/17/2024 at  8:00 AM    rosuvastatin (CRESTOR) 5 MG tablet Take 1 tablet (5 mg) by mouth daily. 12/17/2024 at  8:30 PM    ruxolitinib (JAKAFI) 5 MG TABS tablet Take 1 tablet (5 mg) by mouth 2 times daily 12/17/2024 at  8:30 PM    sennosides (SENOKOT) 8.6 MG tablet Take 1 tablet by mouth 3 times daily as needed for constipation 12/17/2024 at  8:30 PM    sodium chloride 0.9 % neb solution 3 mLs by Other route 2 times daily 12/18/2024 at  8:00 AM    sulfamethoxazole-trimethoprim (BACTRIM DS) 800-160 MG tablet Take 1 tablet by mouth Every Mon, Wed, Fri Morning. 12/18/2024 at  9:00 AM    tacrolimus (PROTOPIC) 0.1 % external ointment Apply topically 2 times daily Past Month

## 2024-12-18 NOTE — PLAN OF CARE
Problem: Adult Inpatient Plan of Care  Goal: Plan of Care Review  Description: The Plan of Care Review/Shift note should be completed every shift.  The Outcome Evaluation is a brief statement about your assessment that the patient is improving, declining, or no change.  This information will be displayed automatically on your shift  note.  Outcome: Progressing   Goal Outcome Evaluation:  Admitted from the ED. Afebrile. Blood pressure 106/71. On room air and sats 96-98 percent. Chest ct was done. Complained of body aches. IVF infusing at 100 ml/hr. Started on cefepime. On a high kcal diet. Ptt level was drawn. Standby assist to the bathroom. Spouse at bedside and is very supportive.

## 2024-12-18 NOTE — ED TRIAGE NOTES
P/W fever starting this morning, T-max 102.4, took 1000mg tylenol at 0545. Hx BMT 2021, on Jakafi 5mg BID anti-rejection, trying to wean off prednisone.

## 2024-12-19 VITALS
WEIGHT: 261.6 LBS | SYSTOLIC BLOOD PRESSURE: 92 MMHG | DIASTOLIC BLOOD PRESSURE: 71 MMHG | TEMPERATURE: 97.9 F | HEIGHT: 73 IN | OXYGEN SATURATION: 94 % | BODY MASS INDEX: 34.67 KG/M2 | HEART RATE: 84 BPM | RESPIRATION RATE: 18 BRPM

## 2024-12-19 LAB
ANION GAP SERPL CALCULATED.3IONS-SCNC: 13 MMOL/L (ref 7–15)
BACTERIA BLD CULT: NORMAL
BACTERIA UR CULT: NORMAL
BASOPHILS # BLD AUTO: 0 10E3/UL (ref 0–0.2)
BASOPHILS NFR BLD AUTO: 0 %
BUN SERPL-MCNC: 27.8 MG/DL (ref 8–23)
CALCIUM SERPL-MCNC: 8.7 MG/DL (ref 8.8–10.4)
CHLORIDE SERPL-SCNC: 102 MMOL/L (ref 98–107)
CREAT SERPL-MCNC: 1.51 MG/DL (ref 0.67–1.17)
EGFRCR SERPLBLD CKD-EPI 2021: 51 ML/MIN/1.73M2
EOSINOPHIL # BLD AUTO: 0 10E3/UL (ref 0–0.7)
EOSINOPHIL NFR BLD AUTO: 0 %
ERYTHROCYTE [DISTWIDTH] IN BLOOD BY AUTOMATED COUNT: 17.4 % (ref 10–15)
GLUCOSE SERPL-MCNC: 102 MG/DL (ref 70–99)
HCO3 SERPL-SCNC: 20 MMOL/L (ref 22–29)
HCT VFR BLD AUTO: 32 % (ref 40–53)
HGB BLD-MCNC: 10.9 G/DL (ref 13.3–17.7)
IGG SERPL-MCNC: 534 MG/DL (ref 610–1616)
IMM GRANULOCYTES # BLD: 0 10E3/UL
IMM GRANULOCYTES NFR BLD: 1 %
LYMPHOCYTES # BLD AUTO: 2.5 10E3/UL (ref 0.8–5.3)
LYMPHOCYTES NFR BLD AUTO: 49 %
MAGNESIUM SERPL-MCNC: 1.7 MG/DL (ref 1.7–2.3)
MCH RBC QN AUTO: 32.9 PG (ref 26.5–33)
MCHC RBC AUTO-ENTMCNC: 34.1 G/DL (ref 31.5–36.5)
MCV RBC AUTO: 97 FL (ref 78–100)
MONOCYTES # BLD AUTO: 0.6 10E3/UL (ref 0–1.3)
MONOCYTES NFR BLD AUTO: 12 %
NEUTROPHILS # BLD AUTO: 2 10E3/UL (ref 1.6–8.3)
NEUTROPHILS NFR BLD AUTO: 39 %
NRBC # BLD AUTO: 0 10E3/UL
NRBC BLD AUTO-RTO: 0 /100
PHOSPHATE SERPL-MCNC: 2.5 MG/DL (ref 2.5–4.5)
PLATELET # BLD AUTO: 86 10E3/UL (ref 150–450)
POTASSIUM SERPL-SCNC: 3.9 MMOL/L (ref 3.4–5.3)
RBC # BLD AUTO: 3.31 10E6/UL (ref 4.4–5.9)
SODIUM SERPL-SCNC: 135 MMOL/L (ref 135–145)
WBC # BLD AUTO: 5.2 10E3/UL (ref 4–11)

## 2024-12-19 PROCEDURE — 83735 ASSAY OF MAGNESIUM: CPT | Performed by: STUDENT IN AN ORGANIZED HEALTH CARE EDUCATION/TRAINING PROGRAM

## 2024-12-19 PROCEDURE — 250N000013 HC RX MED GY IP 250 OP 250 PS 637: Performed by: STUDENT IN AN ORGANIZED HEALTH CARE EDUCATION/TRAINING PROGRAM

## 2024-12-19 PROCEDURE — 250N000013 HC RX MED GY IP 250 OP 250 PS 637

## 2024-12-19 PROCEDURE — 80048 BASIC METABOLIC PNL TOTAL CA: CPT | Performed by: PHYSICIAN ASSISTANT

## 2024-12-19 PROCEDURE — 999N000111 HC STATISTIC OT IP EVAL DEFER

## 2024-12-19 PROCEDURE — 250N000013 HC RX MED GY IP 250 OP 250 PS 637: Performed by: PHYSICIAN ASSISTANT

## 2024-12-19 PROCEDURE — G0378 HOSPITAL OBSERVATION PER HR: HCPCS

## 2024-12-19 PROCEDURE — 250N000011 HC RX IP 250 OP 636: Performed by: STUDENT IN AN ORGANIZED HEALTH CARE EDUCATION/TRAINING PROGRAM

## 2024-12-19 PROCEDURE — 999N000147 HC STATISTIC PT IP EVAL DEFER

## 2024-12-19 PROCEDURE — 250N000012 HC RX MED GY IP 250 OP 636 PS 637: Performed by: PHYSICIAN ASSISTANT

## 2024-12-19 PROCEDURE — 85025 COMPLETE CBC W/AUTO DIFF WBC: CPT | Performed by: PHYSICIAN ASSISTANT

## 2024-12-19 PROCEDURE — 84100 ASSAY OF PHOSPHORUS: CPT | Performed by: STUDENT IN AN ORGANIZED HEALTH CARE EDUCATION/TRAINING PROGRAM

## 2024-12-19 PROCEDURE — 84132 ASSAY OF SERUM POTASSIUM: CPT | Performed by: PHYSICIAN ASSISTANT

## 2024-12-19 PROCEDURE — 258N000003 HC RX IP 258 OP 636: Performed by: PHYSICIAN ASSISTANT

## 2024-12-19 RX ORDER — LEVOFLOXACIN 250 MG/1
750 TABLET, FILM COATED ORAL DAILY
Status: DISPENSED | OUTPATIENT
Start: 2024-12-19

## 2024-12-19 RX ORDER — ACETAMINOPHEN 325 MG/1
650 TABLET ORAL EVERY 8 HOURS PRN
Status: DISPENSED | OUTPATIENT
Start: 2024-12-19

## 2024-12-19 RX ADMIN — PREDNISONE 30 MG: 20 TABLET ORAL at 08:12

## 2024-12-19 RX ADMIN — PANTOPRAZOLE SODIUM 40 MG: 40 TABLET, DELAYED RELEASE ORAL at 08:12

## 2024-12-19 RX ADMIN — LORAZEPAM 1 MG: 1 TABLET ORAL at 02:45

## 2024-12-19 RX ADMIN — LEVOTHYROXINE SODIUM 112 MCG: 0.11 TABLET ORAL at 08:12

## 2024-12-19 RX ADMIN — ACETAMINOPHEN 650 MG: 325 TABLET, FILM COATED ORAL at 13:09

## 2024-12-19 RX ADMIN — PANTOPRAZOLE SODIUM 40 MG: 40 TABLET, DELAYED RELEASE ORAL at 16:37

## 2024-12-19 RX ADMIN — CEFEPIME HYDROCHLORIDE 2 G: 2 INJECTION, POWDER, FOR SOLUTION INTRAVENOUS at 02:45

## 2024-12-19 RX ADMIN — ROSUVASTATIN CALCIUM 5 MG: 5 TABLET, FILM COATED ORAL at 19:50

## 2024-12-19 RX ADMIN — DEXTROSE AND SODIUM CHLORIDE: 5; 450 INJECTION, SOLUTION INTRAVENOUS at 00:03

## 2024-12-19 RX ADMIN — ACYCLOVIR 800 MG: 400 TABLET ORAL at 08:12

## 2024-12-19 RX ADMIN — CARBOXYMETHYLCELLULOSE SODIUM 1 DROP: 5 SOLUTION/ DROPS OPHTHALMIC at 16:37

## 2024-12-19 RX ADMIN — LEVOFLOXACIN 750 MG: 250 TABLET, FILM COATED ORAL at 10:59

## 2024-12-19 RX ADMIN — ACYCLOVIR 800 MG: 400 TABLET ORAL at 19:50

## 2024-12-19 RX ADMIN — ESCITALOPRAM OXALATE 10 MG: 10 TABLET ORAL at 19:51

## 2024-12-19 RX ADMIN — CARBOXYMETHYLCELLULOSE SODIUM 1 DROP: 5 SOLUTION/ DROPS OPHTHALMIC at 08:12

## 2024-12-19 RX ADMIN — CARBOXYMETHYLCELLULOSE SODIUM 1 DROP: 5 SOLUTION/ DROPS OPHTHALMIC at 19:51

## 2024-12-19 ASSESSMENT — ACTIVITIES OF DAILY LIVING (ADL)
ADLS_ACUITY_SCORE: 33

## 2024-12-19 NOTE — UTILIZATION REVIEW
"      Admission Status; Secondary Review Determination         Under the authority of the Utilization Management Committee, the utilization review process indicated a secondary review on the above patient.  The review outcome is based on review of the medical records, discussions with staff, and applying clinical experience noted on the date of the review.          (x) Observation Status Appropriate - This patient does not meet hospital inpatient criteria and is placed in observation status. If this patient's primary payer is Medicare and was admitted as an inpatient, Condition Code 44 should be used and patient status changed to \"observation\".     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 He was hypotensive at 51/41 mmHg upon ED arrival. He was recovering from PJP pneumonia and rhinovirus when he developed these symptoms. Initial management with IV cefepime was initiated due to immunocompromise and chest CT infiltrates, but was discontinued after parainfluenza virus 1 was identified. The patient is on chronic prednisone for GVHD, with stress dosing during this admission. Vital signs have stabilized since admission, CBC remains unchanged, and creatinine has improved to 1.5 mg/dL from 1.7 mg/dL a month prior.    The patient's hospitalization is expected to be brief due to the stabilization of vital signs and improvement in renal function. The identification of parainfluenza virus as the cause of his symptoms, rather than a bacterial infection, has allowed for targeted supportive care without the need for prolonged antibiotic treatment. His chronic conditions, including GVHD and CKD, are being effectively managed with current therapies, and there are no acute " complications necessitating extended inpatient care. The primary focus remains on supportive measures and monitoring until discharge criteria are met.    Fracisco BELLA notified of this determination.    This document was produced using voice recognition software.      The information on this document is developed by the utilization review team in order for the business office to ensure compliance.  This only denotes the appropriateness of proper admission status and does not reflect the quality of care rendered.         The definitions of Inpatient Status and Observation Status used in making the determination above are those provided in the CMS Coverage Manual, Chapter 1 and Chapter 6, section 70.4.      Sincerely,     PRAKASH BARFIELD MD   Utilization Review  Physician Advisor  [facility]

## 2024-12-19 NOTE — PLAN OF CARE
Physical Therapy: Orders received. Chart reviewed and discussed with care team and Occupational Therapy.? Physical Therapy not indicated due to mobilizing ind at his baseline, can get assist from wife as needed.?Pt denies current IP PT needs. Defer discharge recommendations to medical team.? Will complete orders. Please re-consult PT if further needs arise.

## 2024-12-19 NOTE — PLAN OF CARE
"/78 (BP Location: Left arm)   Pulse 89   Temp 98.7  F (37.1  C) (Oral)   Resp 16   Ht 1.854 m (6' 1\")   Wt 118.7 kg (261 lb 9.6 oz)   SpO2 96%   BMI 34.51 kg/m     Neuro: A&Ox4. No new neuro deficit   Cardiac: Afebrile VSS.   Respiratory: Sating >95% on RA.  GI/: Adequate urine output. No BM this shift, need stool sample.   Diet/appetite: Tolerating high kcal and protein diet.   Activity:  Independent, up to chair, bathroom and in halls.  Pain: At acceptable level on current regimen. Denies pain   Skin: No new deficits noted.  LDA's: Port-A-Cath infusing  Labs: No replacement this shift     Plan: Continue with POC. Notify primary team with changes.   Goal Outcome Evaluation:      Plan of Care Reviewed With: patient    Overall Patient Progress: improvingOverall Patient Progress: improving       Problem: Adult Inpatient Plan of Care  Goal: Plan of Care Review  Description: The Plan of Care Review/Shift note should be completed every shift.  The Outcome Evaluation is a brief statement about your assessment that the patient is improving, declining, or no change.  This information will be displayed automatically on your shift  note.  Outcome: Progressing  Flowsheets (Taken 12/19/2024 0530)  Plan of Care Reviewed With: patient  Overall Patient Progress: improving  Goal: Patient-Specific Goal (Individualized)  Description: You can add care plan individualizations to a care plan. Examples of Individualization might be:  \"Parent requests to be called daily at 9am for status\", \"I have a hard time hearing out of my right ear\", or \"Do not touch me to wake me up as it startles  me\".  Outcome: Progressing     Problem: Stem Cell/Bone Marrow Transplant  Goal: Diarrhea Symptom Control  Outcome: Progressing  Goal: Improved Activity Tolerance  Outcome: Progressing  Intervention: Promote Improved Energy  Recent Flowsheet Documentation  Taken 12/19/2024 0000 by Christen Hernandes RN  Sleep/Rest Enhancement: " awakenings minimized  Activity Management: activity adjusted per tolerance  Environmental Support: calm environment promoted  Goal: Improved Oral Mucous Membrane Health and Integrity  Outcome: Progressing  Goal: Nausea and Vomiting Symptom Relief  Outcome: Progressing  Goal: Optimal Nutrition Intake  Outcome: Progressing

## 2024-12-19 NOTE — PROGRESS NOTES
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 Admitted on 12/18/24 with persistent fevers and hypotension, parainfluenza+, recently treated for PJP PNA.   INTERIM HISTORY:   Doing reasonably well this morning. No fevers since coming to 5C. No longer hypotensive. He endorses body aches and feeling unsteady when ambulating. He is eating okay, no nausea, no flares of his chronic GVHD. CT chest looked concerning for an atypical infection, cefepime changed to Levaquin. He will likely discharge tomorrow if clinically stable. Appts requested for 12/24.   REVIEW OF SYSTEMS: Otherwise unremarkable other than what is noted in the Interim History.   PHYSICAL EXAM:   Vitals:  /73 (BP Location: Left arm)   Pulse 96   Temp 97.7  F (36.5  C) (Oral)   Resp 18   Ht 1.854 m (6' 1\")   Wt 118.7 kg (261 lb 9.6 oz)   SpO2 93%   BMI 34.51 kg/m      General Appearance: NAD  HEENT: sclera anicteric. Moist mucus membranes, no ulcerations.  CV: RRR. no murmur or rub.   RESP: CTA bilaterally; no rales or wheezes.   GI: +BS, soft, nontender, nondistended.   EXT: No edema. No cyanosis/clubbing.   SKIN: no lesions or rash  NEURO: A&O x3   PSYCH: Appropriate affect   ROUTINE LABS:  Lab Results   Component Value Date    WBC 5.2 12/19/2024    ANEU 3.5 10/26/2021    HGB 10.9 (L) 12/19/2024    HCT 32.0 (L) 12/19/2024    PLT 86 (L) 12/19/2024     12/19/2024    POTASSIUM 3.9 12/19/2024    CHLORIDE 102 12/19/2024    CO2 20 (L) 12/19/2024     (H) 12/19/2024    BUN 27.8 (H) 12/19/2024    CR 1.51 (H) 12/19/2024    MAG 1.7 12/19/2024    INR 0.92 12/18/2024     CT chest (12/18/24):    New scattered mostly right sided patchy/groundglass/near  consolidative opacities concerning for atypical infection versus other  inflammation. New from 9 days ago. 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 Admitted on 12/18/24 with persistent fevers and hypotension.     Acute lymphoblastic leukemia, [place] chromosome positive. S/p VERÓNICA allo sib PBSCT   2Y restage consistent with MRD negative CR. BM % donor. FISH No evidence of BCR::ABL1 fusion. CG No recipient (male) cells or cells with a t(9;22) or other clonal chromosomal abnormality were detected among the 20 metaphases analyzed.    BCRABL monitoring quarterly     HEME:   Counts dropping.  Ddx: jakafi vs infectio  Anemia of chronic disease  Not currently needing transfusions, but will keep hgb > 7 and plts > 10k if needed.      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      Ocular GVHD - NIH score 1  Follows with ophthalmology, last visit 10/11/2024.  - Currently using scleral lenses and PF AT.       Oral GVHD - NIH score 1  Lichenoid change of L buccal mucosa. Adequate pain control.  - Dexamethasone s/s prn; patient states he has been told to use this only when necessary, so he isn't currently using this.  Order if needed.      Systemic treatment for GVHD  Corticosteroids - On steroid chronically due to frequent flare of GVH with taper. Current dose prior to admission was 15mg every other day alternating with 10mg every other day.  Patient noted to be hypotensive in ED, possibly septic.  Increase pred to 30mg daily x 3 days as stress dose steroids (12/18-20), then resume previous dosing.      GVHD history  cGVHD therapy started on February 24 2022  - Baseline NIH scoring 2/24/2022    Skin 2 maculopapular rash/erythema with no sclerotic features  Mouth score 1     Previous therapies  Tacrolimus - Experienced mild NEGRA, hyperkalemia, hypomagnesemia, and reports some tremors and cramps. Switched to sirolimus Sep 2022.  Sirolimus - Discontinued Oct 2022 due to GVH flare and significant  fluid retention and proteinuria.  Belumosudil - Patient intolerant/with disease flares on tacrolimus and sirolimus as above. This was started on Oct 2022 - skin/liver/oral GVHD inactive, and occular symptoms controlled/symptomatic improvement. Discontinued Oct 2023 due to recurrent infections.  Ruxolitinib - Dec 2024: 12/13 started 5 mg twice daily, with a plan to continue in order to taper prednisone down to 5 mg daily or equivalent or below.     ID:   Immunocompromised secondary to GVHD medications  Parainfluenza virus:  supportive cares.  CXR abnormal,  CT chest above.  Note that findings could relate to recent PJP pna, see below.  PJP pneumonia on BAL (11/1/2024)  - s/p high-dose bactrim per ID recommendation (11/18/2024 - , plan for 21 days), finished atovaquone 12/9 and now on PO bactrim 3x/week for prophy  Rhinovirus infection 11/18/2024  Non-neutropenic fevers:  blood culture, urine culture, sputum culture, CT chest as above; started cefepime, now on Levaquin 750 mg x5 days d/t concern for atypical PNA.  Hypogammaglobulinemia with recurrent infections: IgG 534 (12/18/24)      Prophylaxis   - ACV Patient developed oral herpes reactivation after stopping acyclovir in Jan 2023  - Penicillin 250 mg BID due to atrophic spleen (on hold with cefepime)  Vaccinations  Completed 1Y and 2Y vaccination series.  Received his influenza/COVID vaccine 9/24/2024 and RSV vaccine 8/13.   Complete pneumococcal and HiB vaccine. Recommend meningococcal vaccine once infection resolved.     Renal  History of RCC s/p R nephrectomy in 2007.     CKD:  patient/wife state this is medication related and started since his transplant.  Creatinine had been around 1.3, but thalia to a new baseline of 1.8-2 with addition of high dose Bactrim.  Monitor for improvement now that he is no longer on high dose Bactrim.      Endo:   - Hx of graves disease; On synthroid follows with endocrine     CV:   - Hypertension; On amlodipine (on hold with  "hypotension)   - HLD; On rosuvastatin     Psych:   - Situational anxiety; Lexapro 10mg daily.  Ativan 1mg po q 6 hours prn anxiety.     Code Status: Full Code    Clinically Significant Risk Factors Present on Admission             # Thrombocytopenia: Lowest platelets = 96 in last 2 days, will monitor for bleeding   # Hypertension: Noted on problem list   # Obesity: Estimated body mass index is 34.51 kg/m  as calculated from the following:    Height as of this encounter: 1.854 m (6' 1\").    Weight as of this encounter: 118.7 kg (261 lb 9.6 oz).       # Financial/Environmental Concerns:      Medically Ready for Discharge: Anticipated Tomorrow  I spent 40 minutes in the care of this patient today, which included time necessary for preparation for the visit, obtaining history, ordering medications/tests/procedures as medically indicated, review of pertinent medical literature, counseling of the patient, communication of recommendations to the care team, and documentation time.   Fracisco Smith PA-C     "

## 2024-12-19 NOTE — PLAN OF CARE
5C OT: DEFER    Occupational Therapy: Orders received. Chart reviewed and discussed with care team.? Occupational Therapy not indicated due to pt up INDly at baseline mobility and ADL. Spouse at home to assist if needed.? Defer discharge recommendations to medical team.? Will complete orders.

## 2024-12-19 NOTE — PLAN OF CARE
"/74 (BP Location: Left arm)   Pulse 89   Temp 98.2  F (36.8  C) (Oral)   Resp 18   Ht 1.854 m (6' 1\")   Wt 118.7 kg (261 lb 9.6 oz)   SpO2 95%   BMI 34.51 kg/m      Afebrile; VSS on RA. Pt with mild generalized body aches; declining pharmacological intervention. No other complaints of pain and pt denied nausea throughout shift. Eating and drinking. Intermittent cough; declining intervention. Adequate UOP. No BM on shift; awaiting admission stool sample. Up independently. Continue POC.     Goal Outcome Evaluation:    Plan of Care Reviewed With: patient, spouse  Overall Patient Progress: improvingOverall Patient Progress: improving  Problem: Adult Inpatient Plan of Care  Goal: Plan of Care Review  Description: The Plan of Care Review/Shift note should be completed every shift.  The Outcome Evaluation is a brief statement about your assessment that the patient is improving, declining, or no change.  This information will be displayed automatically on your shift  note.  Outcome: Progressing  Flowsheets (Taken 12/18/2024 2145)  Plan of Care Reviewed With:   patient   spouse  Overall Patient Progress: improving  Goal: Patient-Specific Goal (Individualized)  Description: You can add care plan individualizations to a care plan. Examples of Individualization might be:  \"Parent requests to be called daily at 9am for status\", \"I have a hard time hearing out of my right ear\", or \"Do not touch me to wake me up as it startles  me\".  Outcome: Progressing  Goal: Absence of Hospital-Acquired Illness or Injury  Outcome: Progressing  Intervention: Identify and Manage Fall Risk  Recent Flowsheet Documentation  Taken 12/18/2024 2000 by Hayley Irene, RN  Safety Promotion/Fall Prevention:   clutter free environment maintained   nonskid shoes/slippers when out of bed   patient and family education   room organization consistent   safety round/check completed  Taken 12/18/2024 1600 by Hayley Irene, RN  Safety " Promotion/Fall Prevention:   clutter free environment maintained   nonskid shoes/slippers when out of bed   patient and family education   room organization consistent   safety round/check completed  Intervention: Prevent Skin Injury  Recent Flowsheet Documentation  Taken 12/18/2024 1600 by Hayley Irene RN  Body Position: position changed independently  Skin Protection: adhesive use limited  Intervention: Prevent Infection  Recent Flowsheet Documentation  Taken 12/18/2024 2000 by Hayley Irene RN  Infection Prevention:   cohorting utilized   environmental surveillance performed   equipment surfaces disinfected   hand hygiene promoted   personal protective equipment utilized   rest/sleep promoted   single patient room provided   visitors restricted/screened  Taken 12/18/2024 1600 by Hayley Irene RN  Infection Prevention:   cohorting utilized   environmental surveillance performed   equipment surfaces disinfected   hand hygiene promoted   personal protective equipment utilized   rest/sleep promoted   single patient room provided   visitors restricted/screened  Goal: Optimal Comfort and Wellbeing  Outcome: Progressing  Goal: Readiness for Transition of Care  Outcome: Progressing     Problem: Stem Cell/Bone Marrow Transplant  Goal: Optimal Coping with Transplant  Outcome: Progressing  Goal: Symptom-Free Urinary Elimination  Outcome: Progressing  Goal: Diarrhea Symptom Control  Outcome: Progressing  Intervention: Manage Diarrhea  Recent Flowsheet Documentation  Taken 12/18/2024 1600 by Hayley Irene RN  Skin Protection: adhesive use limited  Perineal Care: perineal hygiene encouraged  Goal: Improved Activity Tolerance  Outcome: Progressing  Intervention: Promote Improved Energy  Recent Flowsheet Documentation  Taken 12/18/2024 1600 by Hayley Irene RN  Activity Management: activity adjusted per tolerance  Goal: Optimal Functional Ability  Outcome: Progressing  Intervention: Optimize Functional  Ability  Recent Flowsheet Documentation  Taken 12/18/2024 1600 by Hayley Irene RN  Activity Management: activity adjusted per tolerance  Goal: Blood Counts Within Acceptable Range  Outcome: Progressing  Intervention: Monitor and Manage Hematologic Symptoms  Recent Flowsheet Documentation  Taken 12/18/2024 2000 by Hayley Irene RN  Medication Review/Management:   medications reviewed   high-risk medications identified  Taken 12/18/2024 1600 by Hayley Irene RN  Bleeding Precautions:   blood pressure closely monitored   foot protection facilitated   gentle oral care promoted   monitored for signs of bleeding  Medication Review/Management:   medications reviewed   high-risk medications identified  Goal: Absence of Hypersensitivity Reaction  Outcome: Progressing  Goal: Absence of Infection  Outcome: Progressing  Intervention: Prevent and Manage Infection  Recent Flowsheet Documentation  Taken 12/18/2024 2000 by Hayley Irene RN  Infection Prevention:   cohorting utilized   environmental surveillance performed   equipment surfaces disinfected   hand hygiene promoted   personal protective equipment utilized   rest/sleep promoted   single patient room provided   visitors restricted/screened  Isolation Precautions:   droplet precautions maintained   enteric precautions initiated  Taken 12/18/2024 1600 by Hayley Irene RN  Infection Prevention:   cohorting utilized   environmental surveillance performed   equipment surfaces disinfected   hand hygiene promoted   personal protective equipment utilized   rest/sleep promoted   single patient room provided   visitors restricted/screened  Isolation Precautions:   droplet precautions maintained   enteric precautions initiated  Goal: Improved Oral Mucous Membrane Health and Integrity  Outcome: Progressing  Intervention: Promote Oral Comfort and Health  Recent Flowsheet Documentation  Taken 12/18/2024 1600 by Hayley rIene RN  Oral Care: oral rinse  provided  Goal: Nausea and Vomiting Symptom Relief  Outcome: Progressing  Intervention: Prevent and Manage Nausea and Vomiting  Recent Flowsheet Documentation  Taken 12/18/2024 1600 by Hayley Irene, RN  Oral Care: oral rinse provided  Goal: Optimal Nutrition Intake  Outcome: Progressing

## 2024-12-20 NOTE — PLAN OF CARE
"Afebrile. AVSS on RA. A&Ox4, up independently. Pt c/o generalized aches and pains today, x1 PRN tylenol given. Voiding approprietly. Possible discharge tomorrow. Continue plan of care.    Problem: Adult Inpatient Plan of Care  Goal: Plan of Care Review  Description: The Plan of Care Review/Shift note should be completed every shift.  The Outcome Evaluation is a brief statement about your assessment that the patient is improving, declining, or no change.  This information will be displayed automatically on your shift  note.  Outcome: Progressing  Goal: Patient-Specific Goal (Individualized)  Description: You can add care plan individualizations to a care plan. Examples of Individualization might be:  \"Parent requests to be called daily at 9am for status\", \"I have a hard time hearing out of my right ear\", or \"Do not touch me to wake me up as it startles  me\".  Outcome: Progressing  Goal: Absence of Hospital-Acquired Illness or Injury  Outcome: Progressing  Intervention: Identify and Manage Fall Risk  Recent Flowsheet Documentation  Taken 12/19/2024 1600 by Winnie Blackmon RN  Safety Promotion/Fall Prevention: safety round/check completed  Taken 12/19/2024 1500 by Winnie Blackmon RN  Safety Promotion/Fall Prevention: safety round/check completed  Taken 12/19/2024 1200 by Winnie Blackmon RN  Safety Promotion/Fall Prevention: safety round/check completed  Taken 12/19/2024 1100 by Winnie Blackmon RN  Safety Promotion/Fall Prevention: safety round/check completed  Taken 12/19/2024 1015 by Winnie Blackmon RN  Safety Promotion/Fall Prevention: safety round/check completed  Taken 12/19/2024 0830 by Winnie Blackmon RN  Safety Promotion/Fall Prevention: safety round/check completed  Intervention: Prevent Skin Injury  Recent Flowsheet Documentation  Taken 12/19/2024 0830 by Winnie Blackmon, RN  Body Position: position changed independently  Skin Protection: adhesive use limited  Intervention: Prevent and Manage VTE (Venous " Thromboembolism) Risk  Recent Flowsheet Documentation  Taken 12/19/2024 0830 by Winnie Blackmon RN  VTE Prevention/Management: SCDs off (sequential compression devices)  Intervention: Prevent Infection  Recent Flowsheet Documentation  Taken 12/19/2024 1600 by Winnie Blackmon RN  Infection Prevention:   environmental surveillance performed   personal protective equipment utilized   rest/sleep promoted  Taken 12/19/2024 1200 by Winnie Blackmon RN  Infection Prevention:   environmental surveillance performed   personal protective equipment utilized   rest/sleep promoted  Taken 12/19/2024 0830 by Winnie Blackmon RN  Infection Prevention:   environmental surveillance performed   personal protective equipment utilized   rest/sleep promoted  Goal: Optimal Comfort and Wellbeing  Outcome: Progressing  Intervention: Monitor Pain and Promote Comfort  Recent Flowsheet Documentation  Taken 12/19/2024 1600 by Winnie Blackmon RN  Pain Management Interventions: declines  Taken 12/19/2024 1200 by Winnie Blackmon RN  Pain Management Interventions: (provider notified for orders) other (see comments)  Goal: Readiness for Transition of Care  Outcome: Progressing     Problem: Stem Cell/Bone Marrow Transplant  Goal: Optimal Coping with Transplant  Outcome: Progressing  Intervention: Optimize Patient/Family Adjustment to Transplant  Recent Flowsheet Documentation  Taken 12/19/2024 0830 by Winnie Blackmon RN  Supportive Measures: active listening utilized  Goal: Symptom-Free Urinary Elimination  Outcome: Progressing  Intervention: Prevent or Manage Bladder Irritation  Recent Flowsheet Documentation  Taken 12/19/2024 1600 by Winnie Blackmon RN  Pain Management Interventions: declines  Taken 12/19/2024 1200 by Winnie Blackmon RN  Pain Management Interventions: (provider notified for orders) other (see comments)  Taken 12/19/2024 0830 by Winnie Blackmon RN  Urinary Elimination Promotion: frequent voiding encouraged  Hyperhydration Management:  fluids provided  Goal: Diarrhea Symptom Control  Outcome: Progressing  Intervention: Manage Diarrhea  Recent Flowsheet Documentation  Taken 12/19/2024 0830 by Winnie Blackmon RN  Skin Protection: adhesive use limited  Fluid/Electrolyte Management: fluids provided  Perineal Care: perineal hygiene encouraged  Goal: Improved Activity Tolerance  Outcome: Progressing  Intervention: Promote Improved Energy  Recent Flowsheet Documentation  Taken 12/19/2024 0830 by Winnie Blackmon RN  Sleep/Rest Enhancement: awakenings minimized  Activity Management: activity adjusted per tolerance  Environmental Support: calm environment promoted  Goal: Optimal Functional Ability  Outcome: Progressing  Intervention: Optimize Functional Ability  Recent Flowsheet Documentation  Taken 12/19/2024 0830 by Winnie Blackmon RN  Activity Management: activity adjusted per tolerance  Goal: Blood Counts Within Acceptable Range  Outcome: Progressing  Intervention: Monitor and Manage Hematologic Symptoms  Recent Flowsheet Documentation  Taken 12/19/2024 1200 by Winnie Blackmon RN  Bleeding Precautions:   blood pressure closely monitored   coagulation study results reviewed   monitored for signs of bleeding  Medication Review/Management: medications reviewed  Taken 12/19/2024 0830 by Winnie Blackmon RN  Sleep/Rest Enhancement: awakenings minimized  Bleeding Precautions:   blood pressure closely monitored   coagulation study results reviewed   monitored for signs of bleeding  Medication Review/Management: medications reviewed  Goal: Absence of Hypersensitivity Reaction  Outcome: Progressing  Goal: Absence of Infection  Outcome: Progressing  Intervention: Prevent and Manage Infection  Recent Flowsheet Documentation  Taken 12/19/2024 1600 by Winnie Blackmon RN  Infection Prevention:   environmental surveillance performed   personal protective equipment utilized   rest/sleep promoted  Isolation Precautions:   contact precautions maintained   droplet  precautions maintained   enteric precautions maintained  Taken 12/19/2024 1200 by Winnie Blackmon RN  Infection Prevention:   environmental surveillance performed   personal protective equipment utilized   rest/sleep promoted  Infection Management: aseptic technique maintained  Isolation Precautions:   contact precautions maintained   droplet precautions maintained   enteric precautions maintained  Taken 12/19/2024 0830 by Winnei Blackmon RN  Infection Prevention:   environmental surveillance performed   personal protective equipment utilized   rest/sleep promoted  Infection Management: aseptic technique maintained  Isolation Precautions:   contact precautions maintained   droplet precautions maintained   enteric precautions maintained  Goal: Improved Oral Mucous Membrane Health and Integrity  Outcome: Progressing  Intervention: Promote Oral Comfort and Health  Recent Flowsheet Documentation  Taken 12/19/2024 0830 by Winnie Blackmon RN  Oral Care: oral rinse provided  Goal: Nausea and Vomiting Symptom Relief  Outcome: Progressing  Intervention: Prevent and Manage Nausea and Vomiting  Recent Flowsheet Documentation  Taken 12/19/2024 0830 by Winnie Blackmon RN  Nausea/Vomiting Interventions: (denies at this time) other (see comments)  Oral Care: oral rinse provided  Goal: Optimal Nutrition Intake  Outcome: Progressing

## 2024-12-20 NOTE — PROGRESS NOTES
"PRIMARY DIAGNOSIS: \"GENERIC\" NURSING  OUTPATIENT/OBSERVATION GOALS TO BE MET BEFORE DISCHARGE:  ADLs back to baseline: Yes    Activity and level of assistance: Ambulating independently.    Pain status: Improved-controlled with oral pain medications.    Return to near baseline physical activity: Yes     Discharge Planner Nurse   Safe discharge environment identified: Yes  Barriers to discharge: No       Entered by: Winnie Blackmon RN 12/19/2024 7:24 PM     Please review provider order for any additional goals.   Nurse to notify provider when observation goals have been met and patient is ready for discharge.    - Afebrile and vital signs stable: Met  - Tolerating PO intake: Met  - Ambulating safely: Met    Providers aware. Plan to assess discharge status in morning.  "

## 2024-12-20 NOTE — PROGRESS NOTES
"BP 92/71 (BP Location: Left arm)   Pulse 84   Temp 97.9  F (36.6  C) (Oral)   Resp 18   Ht 1.854 m (6' 1\")   Wt 118.7 kg (261 lb 9.6 oz)   SpO2 94%   BMI 34.51 kg/m       Afebrile and vital signs stable- Met   - Tolerating PO intake- Met   - Ambulating safely. Met   "

## 2024-12-21 LAB
BACTERIA BLD CULT: NO GROWTH
BACTERIA BLD CULT: NO GROWTH

## 2024-12-23 LAB — BACTERIA BLD CULT: NO GROWTH

## 2024-12-24 ENCOUNTER — ONCOLOGY VISIT (OUTPATIENT)
Dept: TRANSPLANT | Facility: CLINIC | Age: 64
End: 2024-12-24
Attending: INTERNAL MEDICINE
Payer: MEDICARE

## 2024-12-24 VITALS
WEIGHT: 255.6 LBS | SYSTOLIC BLOOD PRESSURE: 106 MMHG | TEMPERATURE: 97.5 F | OXYGEN SATURATION: 97 % | BODY MASS INDEX: 33.72 KG/M2 | RESPIRATION RATE: 18 BRPM | DIASTOLIC BLOOD PRESSURE: 69 MMHG | HEART RATE: 91 BPM

## 2024-12-24 DIAGNOSIS — T86.09 GVHD AS COMPLICATION OF BONE MARROW TRANSPLANT (H): ICD-10-CM

## 2024-12-24 DIAGNOSIS — D89.813 GVHD AS COMPLICATION OF BONE MARROW TRANSPLANT (H): ICD-10-CM

## 2024-12-24 LAB
ALBUMIN SERPL BCG-MCNC: 4 G/DL (ref 3.5–5.2)
ALP SERPL-CCNC: 78 U/L (ref 40–150)
ALT SERPL W P-5'-P-CCNC: 22 U/L (ref 0–70)
ANION GAP SERPL CALCULATED.3IONS-SCNC: 12 MMOL/L (ref 7–15)
AST SERPL W P-5'-P-CCNC: 37 U/L (ref 0–45)
BASOPHILS # BLD AUTO: 0 10E3/UL (ref 0–0.2)
BASOPHILS NFR BLD AUTO: 0 %
BILIRUB SERPL-MCNC: 0.6 MG/DL
BUN SERPL-MCNC: 39.2 MG/DL (ref 8–23)
CALCIUM SERPL-MCNC: 9.6 MG/DL (ref 8.8–10.4)
CHLORIDE SERPL-SCNC: 103 MMOL/L (ref 98–107)
CREAT SERPL-MCNC: 1.39 MG/DL (ref 0.67–1.17)
EGFRCR SERPLBLD CKD-EPI 2021: 56 ML/MIN/1.73M2
EOSINOPHIL # BLD AUTO: 0 10E3/UL (ref 0–0.7)
EOSINOPHIL NFR BLD AUTO: 0 %
ERYTHROCYTE [DISTWIDTH] IN BLOOD BY AUTOMATED COUNT: 17 % (ref 10–15)
GLUCOSE SERPL-MCNC: 97 MG/DL (ref 70–99)
HCO3 SERPL-SCNC: 23 MMOL/L (ref 22–29)
HCT VFR BLD AUTO: 33.6 % (ref 40–53)
HGB BLD-MCNC: 11.7 G/DL (ref 13.3–17.7)
IMM GRANULOCYTES # BLD: 0.1 10E3/UL
IMM GRANULOCYTES NFR BLD: 1 %
LYMPHOCYTES # BLD AUTO: 3.4 10E3/UL (ref 0.8–5.3)
LYMPHOCYTES NFR BLD AUTO: 43 %
MCH RBC QN AUTO: 33.1 PG (ref 26.5–33)
MCHC RBC AUTO-ENTMCNC: 34.8 G/DL (ref 31.5–36.5)
MCV RBC AUTO: 95 FL (ref 78–100)
MONOCYTES # BLD AUTO: 1 10E3/UL (ref 0–1.3)
MONOCYTES NFR BLD AUTO: 13 %
NEUTROPHILS # BLD AUTO: 3.4 10E3/UL (ref 1.6–8.3)
NEUTROPHILS NFR BLD AUTO: 43 %
NRBC # BLD AUTO: 0.1 10E3/UL
NRBC BLD AUTO-RTO: 1 /100
PLATELET # BLD AUTO: 113 10E3/UL (ref 150–450)
POTASSIUM SERPL-SCNC: 3.8 MMOL/L (ref 3.4–5.3)
PROT SERPL-MCNC: 6.6 G/DL (ref 6.4–8.3)
RBC # BLD AUTO: 3.53 10E6/UL (ref 4.4–5.9)
SODIUM SERPL-SCNC: 138 MMOL/L (ref 135–145)
WBC # BLD AUTO: 8 10E3/UL (ref 4–11)

## 2024-12-24 PROCEDURE — 85041 AUTOMATED RBC COUNT: CPT

## 2024-12-24 PROCEDURE — 85004 AUTOMATED DIFF WBC COUNT: CPT

## 2024-12-24 PROCEDURE — 82947 ASSAY GLUCOSE BLOOD QUANT: CPT

## 2024-12-24 PROCEDURE — 36591 DRAW BLOOD OFF VENOUS DEVICE: CPT

## 2024-12-24 PROCEDURE — G0463 HOSPITAL OUTPT CLINIC VISIT: HCPCS

## 2024-12-24 PROCEDURE — 250N000011 HC RX IP 250 OP 636: Performed by: INTERNAL MEDICINE

## 2024-12-24 PROCEDURE — 80053 COMPREHEN METABOLIC PANEL: CPT

## 2024-12-24 RX ORDER — POLYETHYLENE GLYCOL 3350 17 G/17G
POWDER, FOR SOLUTION ORAL
COMMUNITY
Start: 2024-11-22

## 2024-12-24 RX ORDER — HEPARIN SODIUM (PORCINE) LOCK FLUSH IV SOLN 100 UNIT/ML 100 UNIT/ML
5 SOLUTION INTRAVENOUS ONCE
Status: COMPLETED | OUTPATIENT
Start: 2024-12-24 | End: 2024-12-24

## 2024-12-24 RX ADMIN — HEPARIN 5 ML: 100 SYRINGE at 10:39

## 2024-12-24 ASSESSMENT — PAIN SCALES - GENERAL: PAINLEVEL_OUTOF10: NO PAIN (0)

## 2024-12-24 NOTE — LETTER
"12/24/2024      Nik Nava  [address] [address] [id number]      Dear Colleague,    Thank you for referring your patient, Nik Nava, to the [facility] [place]. Please see a copy of my visit note below.    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     Recently with diagnosis of PJP pneumonia. 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    HPI:  Nik was in hospital recently for cough and persistent fevers.  He was noted to have parainfluenza visit prior to that admission, and his symptoms were just not improving.   Imaging after admission revealed a possible atypical pneumonia, and he is currently finishing up a 7 day course of levaquin.  He is no longer having fevers, but the cough is more productive and keeping him up more at night.  He also notes that he feels \"woozy,\" and \"as if he had a couple drinks\" a lot of the time.  No significant hypotension, though checks at home sometimes show a systolic in the upper 90s.    He is eating/drinking.  He has no diarrhea or vomiting, though he does have some nausea.    In summary, Nik and Lamar have notice that the nausea, fatigue, dizziness and wooziness all started when jakafi was initiated.  We reviewed the known adverse effect of jakafi, and these are all on the list.      Saras GVHD is limited to his eyes (well managed with scleral lenses) and his mouth.  His mouth doesn't bother him unless he eats something spicy.  The dex swish/spit is helpful if he needs something for his mouth.     Nik and Lamar overall are wondering why they need a third agent for GVHD that is fairly quiescent.  We discussed that the jakafi is in part to allow a taper of the steroids, since his mouth GVHD has flared in the past with an attempt at steroid reduction.  Additionally, I have reached out to Dr. Cote " (Epic insket) to discuss other options for managing his GVHD and slowly reducing his steroid dose, maybe just a couple milligrams every couple of weeks?  Will await response from Dr. Cote.     ROS: Review of systems was negative except as detailed above     PHYSICAL EXAM          There were no vitals taken for this visit.    Wt Readings from Last 4 Encounters:   12/20/24 116.7 kg (257 lb 4.8 oz)   12/11/24 119.6 kg (263 lb 11.2 oz)   12/10/24 120.9 kg (266 lb 9 oz)   12/10/24 122.3 kg (269 lb 9.6 oz)      KPS: 70  General: NAD.  HEENT: sclera anicteric. Lichenoid changes and associated erythema of b/l buccal mucosa; right buccal mucosa is a bit roughened, but otherwise no open sores.  No active ulceration. No posterior OP redness.    Lungs: CTA b/l  no wheezes  CV: RRR     Abd: Soft   Ext/Skin: no rashes on exposed areas; denies rash      Chronic GVHD          12/24/2024    11:48 12/11/2024    09:07 10/27/2024    09:21   Chronic GVHD   Has chronic tzvcb-ukcqdn-wlyx disease developed since the last entry? No No No   Is there persistence of chronic txxmw-dloalj-zkce disease since the last entry? Yes Yes Yes   Chronic Mbdjc-Ekxiil-Nxxp Disease Global Severity Score   Moderate   Total Chronic Oxont-Cbizui-Ctne Disease Score 2 2 3   Subjective Clinician Opinion of Severity Mild Mild Mild       Blood Counts     Recent Labs   Lab Test 12/20/24 0455 12/19/24  0254 12/18/24  0758   HGB 11.4* 10.9* 11.7*   HCT 34.0* 32.0* 34.8*   WBC 7.1 5.2 7.1   ANEUTAUTO 2.6 2.0 3.4   ALYMPAUTO 3.7 2.5 3.0   AMONOAUTO 0.7 0.6 0.7   AEOSAUTO 0.0 0.0 0.0   ABSBASO 0.0 0.0 0.0   NRBCMAN 0.0 0.0 0.0   PLT 82* 86* 96*       Chemistries     Basic Panel  Recent Labs   Lab Test 12/20/24  0455 12/19/24  0254 12/18/24  0758    135 138   POTASSIUM 4.2 3.9 4.2   CHLORIDE 104 102 102   CO2 23 20* 22   BUN 26.8* 27.8* 33.2*   CR 1.36* 1.51* 1.55*   GLC 94 102* 93      Calcium, Magnesium, Phosphorus  Recent Labs   Lab Test 12/20/24  5319  12/19/24  0254 12/18/24  0758 12/11/24  0719 11/28/24  0352   DAMON 9.2 8.7* 9.0   < > 9.7   MAG 1.9 1.7 1.7  --  2.0   PHOS 2.9 2.5  --   --  3.5    < > = values in this interval not displayed.      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 Admitted on 12/18/24 with persistent fevers and hypotension.   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 S/p VERÓNICA allo sib PBSCT   2Y restage consistent with MRD negative CR. BM % donor. FISH No evidence of BCR::ABL1 fusion. CG No recipient (male) cells or cells with a t(9;22) or other clonal chromosomal abnormality were detected among the 20 metaphases analyzed.    BCRABL monitoring quarterly    HEME:   Anemia of chronic disease  Thombocytopenia due to infection ; now improving despite being on jakafi, which is reassuring.  Not currently needing transfusions, but will keep hgb > 7 and plts > 10k if needed.     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   Ocular GVHD - NIH score 1  Follows with ophthalmology, last visit 10/11/2024.  - Currently using scleral lenses and PF AT.    Oral GVHD - NIH score 1  Lichenoid change of L buccal mucosa. Dexamethasone s/s prn    Systemic treatment for GVHD  Corticosteroids - On steroid chronically due to frequent flare of GVH with taper. Current dose prior to admission was 15mg every other day alternating with 10mg every other day.  He received 30mg daily x 3 days when in hospital due to concern that his hypotension could have been related to needing stress dose steroids.  He confirms he is back to 10/15 alternating every  "other day.    Jakafi was started early December by Dr. Cote, since it has been very difficult to wean his steroids.  He is feeling really poorly since starting jakafi, however (cough, nausea, fatigue, \"wooziness\").  Reached out to Dr. Cote 12/24 to see if we can develop a plan that will allow for a slow steroid taper and avoidance of jakafi.     Previous therapies  Tacrolimus - Experienced mild NEGRA, hyperkalemia, hypomagnesemia, and reports some tremors and cramps. Switched to sirolimus Sep 2022.  Sirolimus - Discontinued Oct 2022 due to GVH flare and significant fluid retention and proteinuria.  Belumosudil - Patient intolerant/with disease flares on tacrolimus and sirolimus as above. This was started on Oct 2022 - skin/liver/oral GVHD inactive, and occular symptoms controlled/symptomatic improvement. Discontinued Oct 2023 due to recurrent infections.  Ruxolitinib - Dec 2024: 12/13 started 5 mg twice daily, with a plan to continue in order to taper prednisone down to 5 mg daily or equivalent or below.  Hoping to discuss with Kera as above.     ID:   Immunocompromised secondary to GVHD medications  Parainfluenza virus:  supportive cares.   Atypical pneumonia:  levaquin 750mg daily x 7 days; then resume penicillin prophylaxis 12/26.   Non-neutropenic fevers:  blood culture, urine culture, sputum culture, CT chest as above; started cefepime, now on Levaquin 750 mg x 7 days for atypical pneumonia, as above.   PJP pneumonia on BAL (11/1/2024)  - s/p high-dose bactrim per ID recommendation (11/18/2024 - , plan for 21 days), finished atovaquone 12/9 and now on PO bactrim 3x/week for prophy  Rhinovirus infection 11/18/2024  Hypogammaglobulinemia with recurrent infections: IgG 534 (12/18/24); not repleted.   Prophylaxis   - ACV Patient developed oral herpes reactivation after stopping acyclovir in Jan 2023  - Penicillin 250 mg BID due to atrophic spleen (on hold with levaquin)  Vaccinations  Completed 1Y and 2Y " vaccination series.  Received his influenza/COVID vaccine 9/24/2024 and RSV vaccine 8/13.   Complete pneumococcal and HiB vaccine. Recommend meningococcal vaccine once infection resolved.    Renal  History of RCC s/p R nephrectomy in 2007.  Has neph-onc follow up 1/7/2025.    CKD:  patient/wife state this is medication related and started since his transplant.  Creatinine had been around 1.3, but thalia to a new baseline of 1.8-2 with addition of high dose Bactrim.    Now improved off high dose steroids.     Endo:   - Hx of graves disease; On synthroid follows with endocrine    CV:   - Hypertension; On amlodipine (on hold with hypotension).  - HLD; On rosuvastatin    Psych:   - Situational anxiety; Lexapro 10mg daily.  Ativan 1mg po q 6 hours prn anxiety.     Return to clinic 1/7; await response from Dr. Cote re immunosuppression.    I spent 45 minutes in the care of this patient today, which included time necessary for preparation for the visit, obtaining history, ordering medications/tests/procedures as medically indicated, review of pertinent medical literature, counseling of the patient, communication of recommendations to the care team, and documentation time.    Teressa Rick PA-C  12/24/2024      Again, thank you for allowing me to participate in the care of your patient.        Sincerely,        BMT Advanced Practice Provider    Electronically signed

## 2024-12-24 NOTE — PROGRESS NOTES
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     Recently with diagnosis of PJP pneumonia. 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    HPI:  Nik was in hospital recently for cough and persistent fevers.  He was noted to have parainfluenza visit prior to that admission, and his symptoms were just not improving.   Imaging after admission revealed a possible atypical pneumonia, and he is currently finishing up a 7 day course of levaquin.  He is no longer having fevers, but the cough is more productive and keeping him up more at night.  He also notes that he feels \"woozy,\" and \"as if he had a couple drinks\" a lot of the time.  No significant hypotension, though checks at home sometimes show a systolic in the upper 90s.    He is eating/drinking.  He has no diarrhea or vomiting, though he does have some nausea.    In summary, Nik and Lamar have notice that the nausea, fatigue, dizziness and wooziness all started when jakafi was initiated.  We reviewed the known adverse effect of jakafi, and these are all on the list.      Nik's GVHD is limited to his eyes (well managed with scleral lenses) and his mouth.  His mouth doesn't bother him unless he eats something spicy.  The dex swish/spit is helpful if he needs something for his mouth.     Nik and Lamar overall are wondering why they need a third agent for GVHD that is fairly quiescent.  We discussed that the jakafi is in part to allow a taper of the steroids, since his mouth GVHD has flared in the past with an attempt at steroid reduction.  Additionally, I have reached out to Dr. Cote ([facility]) to discuss other options for managing his GVHD and slowly reducing his steroid dose, maybe just a couple milligrams every couple of weeks?  Will await response from Dr. Cote.     ROS: Review of systems was negative except as detailed above     PHYSICAL " EXAM          There were no vitals taken for this visit.    Wt Readings from Last 4 Encounters:   12/20/24 116.7 kg (257 lb 4.8 oz)   12/11/24 119.6 kg (263 lb 11.2 oz)   12/10/24 120.9 kg (266 lb 9 oz)   12/10/24 122.3 kg (269 lb 9.6 oz)      KPS: 70  General: NAD.  HEENT: sclera anicteric. Lichenoid changes and associated erythema of b/l buccal mucosa; right buccal mucosa is a bit roughened, but otherwise no open sores.  No active ulceration. No posterior OP redness.    Lungs: CTA b/l  no wheezes  CV: RRR     Abd: Soft   Ext/Skin: no rashes on exposed areas; denies rash      Chronic GVHD          12/24/2024    11:48 12/11/2024    09:07 10/27/2024    09:21   Chronic GVHD   Has chronic eeazw-vxobjd-kduq disease developed since the last entry? No No No   Is there persistence of chronic vorvr-cwrvau-cirr disease since the last entry? Yes Yes Yes   Chronic Wyqpb-Rqnvlm-Kbgx Disease Global Severity Score   Moderate   Total Chronic Pdalu-Xepmuz-Gcje Disease Score 2 2 3   Subjective Clinician Opinion of Severity Mild Mild Mild       Blood Counts     Recent Labs   Lab Test 12/20/24 0455 12/19/24  0254 12/18/24  0758   HGB 11.4* 10.9* 11.7*   HCT 34.0* 32.0* 34.8*   WBC 7.1 5.2 7.1   ANEUTAUTO 2.6 2.0 3.4   ALYMPAUTO 3.7 2.5 3.0   AMONOAUTO 0.7 0.6 0.7   AEOSAUTO 0.0 0.0 0.0   ABSBASO 0.0 0.0 0.0   NRBCMAN 0.0 0.0 0.0   PLT 82* 86* 96*       Chemistries     Basic Panel  Recent Labs   Lab Test 12/20/24 0455 12/19/24  0254 12/18/24  0758    135 138   POTASSIUM 4.2 3.9 4.2   CHLORIDE 104 102 102   CO2 23 20* 22   BUN 26.8* 27.8* 33.2*   CR 1.36* 1.51* 1.55*   GLC 94 102* 93      Calcium, Magnesium, Phosphorus  Recent Labs   Lab Test 12/20/24  0455 12/19/24  0254 12/18/24  0758 12/11/24  0719 11/28/24  0352   DAMON 9.2 8.7* 9.0   < > 9.7   MAG 1.9 1.7 1.7  --  2.0   PHOS 2.9 2.5  --   --  3.5    < > = values in this interval not displayed.      LFTs  Recent Labs   Lab Test 12/18/24  0758 12/16/24  1226 12/11/24  0719  "  BILITOTAL 0.5 0.5 0.9   ALKPHOS 91 88 85   AST 36 35 32   ALT 23 24 27   ALBUMIN 4.1 4.0 4.0     LDH  Recent Labs   Lab Test 04/06/22  0947   *       ASSESSMENT AND PLAN   Nik Nava is a 64 year old male with Ph Pos ALL, day + 1228  s/p sib allo stem cell transplant. Admitted on 12/18/24 with persistent fevers and hypotension.   Acute lymphoblastic leukemia, [place] chromosome positive. S/p VERÓNICA allo sib PBSCT   2Y restage consistent with MRD negative CR. BM % donor. FISH No evidence of BCR::ABL1 fusion. CG No recipient (male) cells or cells with a t(9;22) or other clonal chromosomal abnormality were detected among the 20 metaphases analyzed.    BCRABL monitoring quarterly    HEME:   Anemia of chronic disease  Thombocytopenia due to infection ; now improving despite being on jakafi, which is reassuring.  Not currently needing transfusions, but will keep hgb > 7 and plts > 10k if needed.     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   Ocular GVHD - NIH score 1  Follows with ophthalmology, last visit 10/11/2024.  - Currently using scleral lenses and PF AT.    Oral GVHD - NIH score 1  Lichenoid change of L buccal mucosa. Dexamethasone s/s prn    Systemic treatment for GVHD  Corticosteroids - On steroid chronically due to frequent flare of GVH with taper. Current dose prior to admission was 15mg every other day alternating with 10mg every other day.  He received 30mg daily x 3 days when in hospital due to concern that his hypotension could have been related to needing stress dose steroids.  He confirms he is back to 10/15 alternating every other day.    Jakafi was started early December by Dr. Cote, since it has been very difficult to wean his steroids.  He is feeling really poorly since starting jakafi, however (cough, nausea, fatigue, \"wooziness\").  Reached out to Dr. Cote 12/24 to see if we can " develop a plan that will allow for a slow steroid taper and avoidance of jakafi.     Previous therapies  Tacrolimus - Experienced mild NEGRA, hyperkalemia, hypomagnesemia, and reports some tremors and cramps. Switched to sirolimus Sep 2022.  Sirolimus - Discontinued Oct 2022 due to GVH flare and significant fluid retention and proteinuria.  Belumosudil - Patient intolerant/with disease flares on tacrolimus and sirolimus as above. This was started on Oct 2022 - skin/liver/oral GVHD inactive, and occular symptoms controlled/symptomatic improvement. Discontinued Oct 2023 due to recurrent infections.  Ruxolitinib - Dec 2024: 12/13 started 5 mg twice daily, with a plan to continue in order to taper prednisone down to 5 mg daily or equivalent or below.  Hoping to discuss with Kera as above.     ID:   Immunocompromised secondary to GVHD medications  Parainfluenza virus:  supportive cares.   Atypical pneumonia:  levaquin 750mg daily x 7 days; then resume penicillin prophylaxis 12/26.   Non-neutropenic fevers:  blood culture, urine culture, sputum culture, CT chest as above; started cefepime, now on Levaquin 750 mg x 7 days for atypical pneumonia, as above.   PJP pneumonia on BAL (11/1/2024)  - s/p high-dose bactrim per ID recommendation (11/18/2024 - , plan for 21 days), finished atovaquone 12/9 and now on PO bactrim 3x/week for prophy  Rhinovirus infection 11/18/2024  Hypogammaglobulinemia with recurrent infections: IgG 534 (12/18/24); not repleted.   Prophylaxis   - ACV Patient developed oral herpes reactivation after stopping acyclovir in Jan 2023  - Penicillin 250 mg BID due to atrophic spleen (on hold with levaquin)  Vaccinations  Completed 1Y and 2Y vaccination series.  Received his influenza/COVID vaccine 9/24/2024 and RSV vaccine 8/13.   Complete pneumococcal and HiB vaccine. Recommend meningococcal vaccine once infection resolved.    Renal  History of RCC s/p R nephrectomy in 2007.  Has neph-onc follow up  1/7/2025.    CKD:  patient/wife state this is medication related and started since his transplant.  Creatinine had been around 1.3, but thalia to a new baseline of 1.8-2 with addition of high dose Bactrim.    Now improved off high dose steroids.     Endo:   - Hx of graves disease; On synthroid follows with endocrine    CV:   - Hypertension; On amlodipine (on hold with hypotension).  - HLD; On rosuvastatin    Psych:   - Situational anxiety; Lexapro 10mg daily.  Ativan 1mg po q 6 hours prn anxiety.     Return to clinic 1/7; await response from Dr. Cote re immunosuppression.    I spent 45 minutes in the care of this patient today, which included time necessary for preparation for the visit, obtaining history, ordering medications/tests/procedures as medically indicated, review of pertinent medical literature, counseling of the patient, communication of recommendations to the care team, and documentation time.    Teressa Rick PA-C  12/24/2024

## 2024-12-24 NOTE — NURSING NOTE
"Oncology Rooming Note    December 24, 2024 10:47 AM   Nik Nava is a 64 year old male who presents for:    Chief Complaint   Patient presents with    Blood Draw     Port blood draw by lab RN    Oncology Clinic Visit     GVHD as complication of bone marrow transplant      Initial Vitals: /69   Pulse 91   Temp 97.5  F (36.4  C) (Oral)   Resp 18   Wt 115.9 kg (255 lb 9.6 oz)   SpO2 97%   BMI 33.72 kg/m   Estimated body mass index is 33.72 kg/m  as calculated from the following:    Height as of 12/18/24: 1.854 m (6' 1\").    Weight as of this encounter: 115.9 kg (255 lb 9.6 oz). Body surface area is 2.44 meters squared.  No Pain (0) Comment: Data Unavailable   No LMP for male patient.  Allergies reviewed: Yes  Medications reviewed: Yes    Medications: Medication refills not needed today.  Pharmacy name entered into [facility]:    [facility] - [place], [place] - [address] [place], [place] - [address] [place] [address] [phone]  [facility]  [facility] - [place], [place] - [address] [place] [address]    Frailty Screening:   Is the patient here for a new oncology consult visit in cancer care? 2. No      Clinical concerns: cough getting worse. Last 4 days has been more significant.      Has issues with nausea, consistent through the day. Says more often noticeable then not.     Pt says he gets light headed getting up wonders if he should do orthostatic vitals     Deshawn Chaudhary              " 210.1

## 2024-12-24 NOTE — NURSING NOTE
Chief Complaint   Patient presents with    Blood Draw     Port blood draw by lab RN       Port accessed with blood draw and heparin flush by lab RN. Vitals taken and next appointment arrived.    Natty Ceballos RN

## 2024-12-26 ENCOUNTER — CARE COORDINATION (OUTPATIENT)
Dept: TRANSPLANT | Facility: CLINIC | Age: 64
End: 2024-12-26
Payer: MEDICARE

## 2024-12-26 DIAGNOSIS — N18.2 CKD (CHRONIC KIDNEY DISEASE) STAGE 2, GFR 60-89 ML/MIN: Primary | ICD-10-CM

## 2024-12-26 NOTE — ORAL ONC MGMT
Oral Chemotherapy Monitoring Program  Lab Follow Up    Reviewed lab results from 12/24.        12/11/2024    11:00 AM 12/11/2024     4:00 PM 12/26/2024    11:00 AM   ORAL CHEMOTHERAPY   Assessment Type Initial Work up New Teach Lab Monitoring;Initial Follow up   Diagnosis Code Graft Versus Host Disease (GVHD) - Chronic Graft Versus Host Disease (GVHD) - Chronic Graft Versus Host Disease (GVHD) - Chronic   Providers Kera Cote   Clinic Name/Location [facility]   Is this patient followed by the [facility] OC team? No No No   Drug Name Jakafi (ruxolitinib) Jakafi (ruxolitinib) Jakafi (ruxolitinib)   Dose 5 mg 5 mg 5 mg   Current Schedule BID BID BID   Cycle Details Continuous Continuous Continuous       Labs:  _  Result Component Current Result Ref Range   Sodium 138 (12/24/2024) 135 - 145 mmol/L     _  Result Component Current Result Ref Range   Potassium 3.8 (12/24/2024) 3.4 - 5.3 mmol/L     _  Result Component Current Result Ref Range   Calcium 9.6 (12/24/2024) 8.8 - 10.4 mg/dL     _  Result Component Current Result Ref Range   Magnesium 1.9 (12/20/2024) 1.7 - 2.3 mg/dL     _  Result Component Current Result Ref Range   Phosphorus 2.9 (12/20/2024) 2.5 - 4.5 mg/dL     _  Result Component Current Result Ref Range   Albumin 4.0 (12/24/2024) 3.5 - 5.2 g/dL     _  Result Component Current Result Ref Range   Urea Nitrogen 39.2 (H) (12/24/2024) 8.0 - 23.0 mg/dL     _  Result Component Current Result Ref Range   Creatinine 1.39 (H) (12/24/2024) 0.67 - 1.17 mg/dL     _  Result Component Current Result Ref Range   AST 37 (12/24/2024) 0 - 45 U/L     _  Result Component Current Result Ref Range   ALT 22 (12/24/2024) 0 - 70 U/L     _  Result Component Current Result Ref Range   Bilirubin Total 0.6 (12/24/2024) <=1.2 mg/dL     _  Result Component Current Result Ref Range   WBC Count 8.0 (12/24/2024) 4.0 - 11.0 10e3/uL     _  Result Component Current Result Ref Range   Hemoglobin 11.7 (L) (12/24/2024) 13.3 -  17.7 g/dL     _  Result Component Current Result Ref Range   Platelet Count 113 (L) (12/24/2024) 150 - 450 10e3/uL     No results found for ANC within last 30 days.     _  Result Component Current Result Ref Range   Absolute Neutrophils 3.4 (12/24/2024) 1.6 - 8.3 10e3/uL        Assessment & Plan:  Results are concerning for Grade 1 anemia and thrombocytopenia. No changes needed, continue to monitor.   Continue Jakafi as prescribed.    Questions answered to patient's satisfaction. Seen by provider.      Follow-Up:  1/7 labs, appts with BMT & nephrology and possible transfusions    Grace Crain, Hoa  Oral Chemotherapy Monitoring Program  [facility]  [phone]

## 2024-12-26 NOTE — PROGRESS NOTES
BMT Nurse Triage - Generic/Other Symptoms     Treating Provider:   Kera    Date of last office visit: 12/24    Onset of symptoms: 1 day     Duration of symptoms: 3 day    Brief description of symptoms: Patient's wife reports he has been feeling weak and fatigued since starting on Jakafi. Took morning medications today and felt dizzy. BP was 82/62. Able to drink fluids and BP improved to 117/82. Wife wanting to discuss if he should continue his Jakafi.     Addendum: Discussed with Dr. Dutta (covering BMT physician) who recommended to increase prednisone dosing. Called to update wife Lamar and Nik. Both were hesitant to increase prednisone and would rather hold malorie's Jakafi does and await input from Dr. Cote. Will plan to reach out to Dr. Cote tomorrow.

## 2024-12-27 LAB
ACID FAST STAIN (ARUP): NORMAL

## 2024-12-31 ENCOUNTER — ONCOLOGY VISIT (OUTPATIENT)
Dept: TRANSPLANT | Facility: CLINIC | Age: 64
End: 2024-12-31
Attending: PHYSICIAN ASSISTANT
Payer: MEDICARE

## 2024-12-31 ENCOUNTER — ANCILLARY PROCEDURE (OUTPATIENT)
Dept: GENERAL RADIOLOGY | Facility: CLINIC | Age: 64
End: 2024-12-31
Payer: MEDICARE

## 2024-12-31 ENCOUNTER — TELEPHONE (OUTPATIENT)
Dept: TRANSPLANT | Facility: CLINIC | Age: 64
End: 2024-12-31
Payer: MEDICARE

## 2024-12-31 VITALS
OXYGEN SATURATION: 97 % | BODY MASS INDEX: 33.51 KG/M2 | WEIGHT: 254 LBS | HEART RATE: 117 BPM | RESPIRATION RATE: 16 BRPM | DIASTOLIC BLOOD PRESSURE: 63 MMHG | SYSTOLIC BLOOD PRESSURE: 99 MMHG | TEMPERATURE: 99.4 F

## 2024-12-31 DIAGNOSIS — T86.09 GVHD AS COMPLICATION OF BONE MARROW TRANSPLANT (H): Primary | ICD-10-CM

## 2024-12-31 DIAGNOSIS — C91.01 ACUTE LYMPHOBLASTIC LEUKEMIA (ALL) IN REMISSION (H): ICD-10-CM

## 2024-12-31 DIAGNOSIS — Z94.81 S/P BONE MARROW TRANSPLANT (H): ICD-10-CM

## 2024-12-31 DIAGNOSIS — R50.9 FEVER: ICD-10-CM

## 2024-12-31 DIAGNOSIS — R68.83 CHILLS (WITHOUT FEVER): ICD-10-CM

## 2024-12-31 DIAGNOSIS — D89.813 GVHD AS COMPLICATION OF BONE MARROW TRANSPLANT (H): ICD-10-CM

## 2024-12-31 DIAGNOSIS — T86.09 GVHD AS COMPLICATION OF BONE MARROW TRANSPLANT (H): ICD-10-CM

## 2024-12-31 DIAGNOSIS — D89.813 GVHD AS COMPLICATION OF BONE MARROW TRANSPLANT (H): Primary | ICD-10-CM

## 2024-12-31 LAB
ALBUMIN SERPL BCG-MCNC: 3.9 G/DL (ref 3.5–5.2)
ALP SERPL-CCNC: 79 U/L (ref 40–150)
ALT SERPL W P-5'-P-CCNC: 21 U/L (ref 0–70)
ANION GAP SERPL CALCULATED.3IONS-SCNC: 10 MMOL/L (ref 7–15)
AST SERPL W P-5'-P-CCNC: 30 U/L (ref 0–45)
BASOPHILS # BLD AUTO: 0 10E3/UL (ref 0–0.2)
BASOPHILS NFR BLD AUTO: 0 %
BILIRUB SERPL-MCNC: 0.6 MG/DL
BUN SERPL-MCNC: 28.6 MG/DL (ref 8–23)
C PNEUM DNA SPEC QL NAA+PROBE: NOT DETECTED
CALCIUM SERPL-MCNC: 9.3 MG/DL (ref 8.8–10.4)
CHLORIDE SERPL-SCNC: 103 MMOL/L (ref 98–107)
CREAT SERPL-MCNC: 1.34 MG/DL (ref 0.67–1.17)
EGFRCR SERPLBLD CKD-EPI 2021: 58 ML/MIN/1.73M2
EOSINOPHIL # BLD AUTO: 0 10E3/UL (ref 0–0.7)
EOSINOPHIL NFR BLD AUTO: 0 %
ERYTHROCYTE [DISTWIDTH] IN BLOOD BY AUTOMATED COUNT: 17.7 % (ref 10–15)
FLUAV H1 2009 PAND RNA SPEC QL NAA+PROBE: DETECTED
FLUAV H1 RNA SPEC QL NAA+PROBE: NOT DETECTED
FLUAV H3 RNA SPEC QL NAA+PROBE: NOT DETECTED
FLUAV RNA SPEC QL NAA+PROBE: DETECTED
FLUAV RNA SPEC QL NAA+PROBE: POSITIVE
FLUBV RNA RESP QL NAA+PROBE: NEGATIVE
FLUBV RNA SPEC QL NAA+PROBE: NOT DETECTED
GLUCOSE SERPL-MCNC: 104 MG/DL (ref 70–99)
HADV DNA SPEC QL NAA+PROBE: NOT DETECTED
HCO3 SERPL-SCNC: 23 MMOL/L (ref 22–29)
HCOV PNL SPEC NAA+PROBE: NOT DETECTED
HCT VFR BLD AUTO: 30.9 % (ref 40–53)
HGB BLD-MCNC: 10.6 G/DL (ref 13.3–17.7)
HMPV RNA SPEC QL NAA+PROBE: NOT DETECTED
HPIV1 RNA SPEC QL NAA+PROBE: DETECTED
HPIV2 RNA SPEC QL NAA+PROBE: NOT DETECTED
HPIV3 RNA SPEC QL NAA+PROBE: NOT DETECTED
HPIV4 RNA SPEC QL NAA+PROBE: NOT DETECTED
IMM GRANULOCYTES # BLD: 0.1 10E3/UL
IMM GRANULOCYTES NFR BLD: 1 %
LYMPHOCYTES # BLD AUTO: 1 10E3/UL (ref 0.8–5.3)
LYMPHOCYTES NFR BLD AUTO: 13 %
M PNEUMO DNA SPEC QL NAA+PROBE: NOT DETECTED
MCH RBC QN AUTO: 32.8 PG (ref 26.5–33)
MCHC RBC AUTO-ENTMCNC: 34.3 G/DL (ref 31.5–36.5)
MCV RBC AUTO: 96 FL (ref 78–100)
MONOCYTES # BLD AUTO: 1.4 10E3/UL (ref 0–1.3)
MONOCYTES NFR BLD AUTO: 17 %
NEUTROPHILS # BLD AUTO: 5.4 10E3/UL (ref 1.6–8.3)
NEUTROPHILS NFR BLD AUTO: 69 %
NRBC # BLD AUTO: 0 10E3/UL
NRBC BLD AUTO-RTO: 1 /100
PLAT MORPH BLD: NORMAL
PLATELET # BLD AUTO: 141 10E3/UL (ref 150–450)
POTASSIUM SERPL-SCNC: 4.7 MMOL/L (ref 3.4–5.3)
PROT SERPL-MCNC: 6.3 G/DL (ref 6.4–8.3)
RBC # BLD AUTO: 3.23 10E6/UL (ref 4.4–5.9)
RBC MORPH BLD: NORMAL
RSV RNA SPEC NAA+PROBE: NEGATIVE
RSV RNA SPEC QL NAA+PROBE: NOT DETECTED
RSV RNA SPEC QL NAA+PROBE: NOT DETECTED
RV+EV RNA SPEC QL NAA+PROBE: NOT DETECTED
SARS-COV-2 RNA RESP QL NAA+PROBE: NEGATIVE
SODIUM SERPL-SCNC: 136 MMOL/L (ref 135–145)
WBC # BLD AUTO: 7.8 10E3/UL (ref 4–11)

## 2024-12-31 PROCEDURE — 71046 X-RAY EXAM CHEST 2 VIEWS: CPT | Performed by: RADIOLOGY

## 2024-12-31 PROCEDURE — 87633 RESP VIRUS 12-25 TARGETS: CPT

## 2024-12-31 PROCEDURE — 82374 ASSAY BLOOD CARBON DIOXIDE: CPT

## 2024-12-31 PROCEDURE — 250N000011 HC RX IP 250 OP 636: Performed by: PHYSICIAN ASSISTANT

## 2024-12-31 PROCEDURE — 87637 SARSCOV2&INF A&B&RSV AMP PRB: CPT

## 2024-12-31 PROCEDURE — 85025 COMPLETE CBC W/AUTO DIFF WBC: CPT

## 2024-12-31 PROCEDURE — 36591 DRAW BLOOD OFF VENOUS DEVICE: CPT

## 2024-12-31 PROCEDURE — G0463 HOSPITAL OUTPT CLINIC VISIT: HCPCS

## 2024-12-31 PROCEDURE — 80048 BASIC METABOLIC PNL TOTAL CA: CPT

## 2024-12-31 RX ORDER — PENICILLIN V POTASSIUM 250 MG/1
250 TABLET, FILM COATED ORAL 2 TIMES DAILY
COMMUNITY
Start: 2024-12-31

## 2024-12-31 RX ORDER — HEPARIN SODIUM (PORCINE) LOCK FLUSH IV SOLN 100 UNIT/ML 100 UNIT/ML
5 SOLUTION INTRAVENOUS
Status: COMPLETED | OUTPATIENT
Start: 2024-12-31 | End: 2024-12-31

## 2024-12-31 RX ORDER — PREDNISONE 10 MG/1
10 TABLET ORAL DAILY
COMMUNITY
Start: 2024-12-31

## 2024-12-31 RX ADMIN — HEPARIN 3 ML: 100 SYRINGE at 13:58

## 2024-12-31 ASSESSMENT — PAIN SCALES - GENERAL: PAINLEVEL_OUTOF10: MILD PAIN (3)

## 2024-12-31 NOTE — NURSING NOTE
"Chief Complaint   Patient presents with    Port Draw     Labs drawn from port byrn.  VS taken.     Port accessed with 20 gauge 3/4 \" Power needle and labs drawn by rn.  Port flushed with NS and heparin.  Pt tolerated well.  VS taken.  Pt checked in for next appt.    Argenis Watkins RN    "

## 2024-12-31 NOTE — PROGRESS NOTES
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     Recently with diagnosis of PJP pneumonia. 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    HPI:  Nik was added on to the clinic schedule for cough and fever.  He was recently treated for possible atypical pneumonia just completing a 7 day course of levaquin. His BP is good--much better off the jakafi.  His last dose of jakafi was 12/26 AM.  His cough is not productive.  He endorses some SOB with activity, none at rest.  His fever is relieved w/ tylenol and his cough is made better by OTC cough syrup.  He visited w/ his daughter on Marty and a couple days later she developed cough and fever also.  He is drinking plenty of fluids.  He is tired.      ROS: Review of systems was negative except as detailed above     PHYSICAL EXAM          Blood pressure 99/63, pulse 117, temperature 99.4  F (37.4  C), temperature source Oral, resp. rate 16, weight 115.2 kg (254 lb), SpO2 97%.    Wt Readings from Last 4 Encounters:   12/31/24 115.2 kg (254 lb)   12/24/24 115.9 kg (255 lb 9.6 oz)   12/20/24 116.7 kg (257 lb 4.8 oz)   12/11/24 119.6 kg (263 lb 11.2 oz)      KPS: 70  General: NAD.  HEENT: sclera anicteric. Lichenoid changes and associated erythema of b/l buccal mucosa; right buccal mucosa is a bit roughened, but otherwise no open sores.  No active ulceration. Mild OP erythema.    Lungs: few coarse BS which clear w/ cough; no wheezes  CV: RRR, HR 80s on exam     Ext/Skin: no rash      Chronic GVHD          12/24/2024    11:48 12/11/2024    09:07 10/27/2024    09:21   Chronic GVHD   Has chronic dgppf-llmsmj-xshs disease developed since the last entry? No No No   Is there persistence of chronic bgsxg-hhhsnw-wwvc disease since the last entry? Yes Yes Yes   Chronic Uhrer-Tiogdt-Hite Disease Global Severity Score   Moderate   Total Chronic Xsswd-Dnhwax-Aznd Disease  Score 2 2 3   Subjective Clinician Opinion of Severity Mild Mild Mild       Lab Results   Component Value Date    WBC 7.8 12/31/2024    ANEU 3.5 10/26/2021    HGB 10.6 (L) 12/31/2024    HCT 30.9 (L) 12/31/2024     (L) 12/31/2024     12/31/2024    POTASSIUM 4.7 12/31/2024    CHLORIDE 103 12/31/2024    CO2 23 12/31/2024     (H) 12/31/2024    BUN 28.6 (H) 12/31/2024    CR 1.34 (H) 12/31/2024    MAG 1.9 12/20/2024    INR 0.92 12/18/2024         ASSESSMENT AND PLAN   Nik Nava is a 64 year old male with Ph Pos ALL, day + 1228  s/p sib allo stem cell transplant. Admitted on 12/18/24 with persistent fevers and hypotension.   Acute lymphoblastic leukemia, [place] chromosome positive. S/p VEÓRNICA allo sib PBSCT   2Y restage consistent with MRD negative CR. BM % donor. FISH No evidence of BCR::ABL1 fusion. CG No recipient (male) cells or cells with a t(9;22) or other clonal chromosomal abnormality were detected among the 20 metaphases analyzed.    BCRABL monitoring quarterly    HEME:   Anemia of chronic disease  Thombocytopenia due to infection ; now improving despite being on jakafi, which is reassuring.  Not currently needing transfusions, but will keep hgb > 7 and plts > 10k if needed.     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   Ocular GVHD - NIH score 1  Follows with ophthalmology, last visit 10/11/2024.  - Currently using scleral lenses and PF AT.    Oral GVHD - NIH score 1  Lichenoid change of L buccal mucosa. Dexamethasone s/s prn    Systemic treatment for GVHD  Corticosteroids - On steroid chronically due to frequent flare of GVH with taper. Current dose prior to admission was 15mg every other day alternating with 10mg every other day.  He confirms he is back to 10/15 alternating every other day.    Jakafi was started early December by Dr. Cote, since it has been very difficult to wean his  "steroids.  He is felt really poorly since starting jakafi, however (cough, nausea, fatigue, \"wooziness\").  Jakafi stopped (last dose 12/26 AM)    Previous therapies  Tacrolimus - Experienced mild NEGRA, hyperkalemia, hypomagnesemia, and reports some tremors and cramps. Switched to sirolimus Sep 2022.  Sirolimus - Discontinued Oct 2022 due to GVH flare and significant fluid retention and proteinuria.  Belumosudil - Patient intolerant/with disease flares on tacrolimus and sirolimus as above. This was started on Oct 2022 - skin/liver/oral GVHD inactive, and occular symptoms controlled/symptomatic improvement. Discontinued Oct 2023 due to recurrent infections.  Ruxolitinib - Dec 2024: 12/13 started 5 mg twice daily, with a plan to continue in order to taper prednisone down to 5 mg daily or equivalent or below.  Hoping to discuss with Kera as above.     ID:   Immunocompromised secondary to GVHD medications  Recurrence of fever (since early this AM) and persistence of non-productive cough.  Repeat viral swabs (pending).  Continue supportive cares.  - repeat CXR (12/31) negative  Parainfluenza virus (12/16)  Atypical pneumonia:  completed levaquin 750mg daily x 7 days; then resume penicillin prophylaxis 12/26.     PJP pneumonia on BAL (11/1/2024)  - s/p high-dose bactrim per ID recommendation (21 day treatment), finished atovaquone 12/9 and now on PO bactrim 3x/week for prophy  Rhinovirus infection 11/18/2024  Hypogammaglobulinemia with recurrent infections: IgG 534 (12/18/24); not repleted.   Prophylaxis   - ACV Patient developed oral herpes reactivation after stopping acyclovir in Jan 2023  - Penicillin 250 mg BID due to atrophic spleen   Vaccinations  Completed 1Y and 2Y vaccination series.  Received his influenza/COVID vaccine 9/24/2024 and RSV vaccine 8/13.   Complete pneumococcal and HiB vaccine. Recommend meningococcal vaccine once infection resolved.    Renal  History of RCC s/p R nephrectomy in 2007.  Has " neph-onc follow up 1/7/2025.    CKD:  patient/wife state this is medication related and started since his transplant.  Creatinine had been around 1.3, but thalia to a new baseline of 1.8-2 with addition of high dose Bactrim.    Now improved off high dose steroids.     Endo:   - Hx of graves disease; On synthroid follows with endocrine    CV:   - Hypertension; On amlodipine (on hold with hypotension).  - HLD; On rosuvastatin    Psych:   - Situational anxiety; Lexapro 10mg daily.  Ativan 1mg po q 6 hours prn anxiety.     Final plan:  CXR ok  Repeat RVP, covid/flu--have asked my colleague to f/up and likely treat if pos for flu or covid  Supportive care--tylenol prn, cough suppressant, push PO intake    Return to clinic 1/7, sooner prn    I spent 30 minutes in the care of this patient today, which included time necessary for preparation for the visit, obtaining history, ordering medications/tests/procedures as medically indicated, review of pertinent medical literature, counseling of the patient, communication of recommendations to the care team, and documentation time.    Lashanda Figueroa pa-c  [phone]

## 2024-12-31 NOTE — LETTER
12/31/2024      Nik Nava  [address] [facility] [address]      Dear Colleague,    Thank you for referring your patient, Nik Nava, to the [facility] [place]. Please see a copy of my visit note below.    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     Recently with diagnosis of PJP pneumonia. 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    HPI:  Nik was added on to the clinic schedule for cough and fever.  He was recently treated for possible atypical pneumonia just completing a 7 day course of levaquin. His BP is good--much better off the jakafi.  His last dose of jakafi was 12/26 AM.  His cough is not productive.  He endorses some SOB with activity, none at rest.  His fever is relieved w/ tylenol and his cough is made better by OTC cough syrup.  He visited w/ his daughter on [place] and a couple days later she developed cough and fever also.  He is drinking plenty of fluids.  He is tired.      ROS: Review of systems was negative except as detailed above     PHYSICAL EXAM          Blood pressure 99/63, pulse 117, temperature 99.4  F (37.4  C), temperature source Oral, resp. rate 16, weight 115.2 kg (254 lb), SpO2 97%.    Wt Readings from Last 4 Encounters:   12/31/24 115.2 kg (254 lb)   12/24/24 115.9 kg (255 lb 9.6 oz)   12/20/24 116.7 kg (257 lb 4.8 oz)   12/11/24 119.6 kg (263 lb 11.2 oz)      KPS: 70  General: NAD.  HEENT: sclera anicteric. Lichenoid changes and associated erythema of b/l buccal mucosa; right buccal mucosa is a bit roughened, but otherwise no open sores.  No active ulceration. Mild OP erythema.    Lungs: few coarse BS which clear w/ cough; no wheezes  CV: RRR, HR 80s on exam     Ext/Skin: no rash      Chronic GVHD          12/24/2024    11:48 12/11/2024    09:07 10/27/2024    09:21   Chronic GVHD   Has chronic  xumcp-zfmepf-bdus disease developed since the last entry? No No No   Is there persistence of chronic oxqlc-cqueab-dxgu disease since the last entry? Yes Yes Yes   Chronic Sbzsc-Brrjif-Vybt Disease Global Severity Score   Moderate   Total Chronic Naeag-Ihfchm-Sjcw Disease Score 2 2 3   Subjective Clinician Opinion of Severity Mild Mild Mild       Lab Results   Component Value Date    WBC 7.8 12/31/2024    ANEU 3.5 10/26/2021    HGB 10.6 (L) 12/31/2024    HCT 30.9 (L) 12/31/2024     (L) 12/31/2024     12/31/2024    POTASSIUM 4.7 12/31/2024    CHLORIDE 103 12/31/2024    CO2 23 12/31/2024     (H) 12/31/2024    BUN 28.6 (H) 12/31/2024    CR 1.34 (H) 12/31/2024    MAG 1.9 12/20/2024    INR 0.92 12/18/2024         ASSESSMENT AND PLAN   Nik Nava is a 64 year old male with Ph Pos ALL, day + 1228  s/p sib allo stem cell transplant. Admitted on 12/18/24 with persistent fevers and hypotension.   Acute lymphoblastic leukemia, [place] chromosome positive. S/p VERÓNICA allo sib PBSCT   2Y restage consistent with MRD negative CR. BM % donor. FISH No evidence of BCR::ABL1 fusion. CG No recipient (male) cells or cells with a t(9;22) or other clonal chromosomal abnormality were detected among the 20 metaphases analyzed.    BCRABL monitoring quarterly    HEME:   Anemia of chronic disease  Thombocytopenia due to infection ; now improving despite being on jakafi, which is reassuring.  Not currently needing transfusions, but will keep hgb > 7 and plts > 10k if needed.     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   Ocular GVHD - NIH score 1  Follows with ophthalmology, last visit 10/11/2024.  - Currently using scleral lenses and PF AT.    Oral GVHD - NIH score 1  Lichenoid change of L buccal mucosa. Dexamethasone s/s prn    Systemic treatment for GVHD  Corticosteroids - On steroid chronically due to frequent flare of  "GVH with taper. Current dose prior to admission was 15mg every other day alternating with 10mg every other day.  He confirms he is back to 10/15 alternating every other day.    Jakafi was started early December by Dr. Cote, since it has been very difficult to wean his steroids.  He is felt really poorly since starting jakafi, however (cough, nausea, fatigue, \"wooziness\").  Jakafi stopped (last dose 12/26 AM)    Previous therapies  Tacrolimus - Experienced mild NEGRA, hyperkalemia, hypomagnesemia, and reports some tremors and cramps. Switched to sirolimus Sep 2022.  Sirolimus - Discontinued Oct 2022 due to GVH flare and significant fluid retention and proteinuria.  Belumosudil - Patient intolerant/with disease flares on tacrolimus and sirolimus as above. This was started on Oct 2022 - skin/liver/oral GVHD inactive, and occular symptoms controlled/symptomatic improvement. Discontinued Oct 2023 due to recurrent infections.  Ruxolitinib - Dec 2024: 12/13 started 5 mg twice daily, with a plan to continue in order to taper prednisone down to 5 mg daily or equivalent or below.  Hoping to discuss with Kera as above.     ID:   Immunocompromised secondary to GVHD medications  Recurrence of fever (since early this AM) and persistence of non-productive cough.  Repeat viral swabs (pending).  Continue supportive cares.  - repeat CXR (12/31) negative  Parainfluenza virus (12/16)  Atypical pneumonia:  completed levaquin 750mg daily x 7 days; then resume penicillin prophylaxis 12/26.     PJP pneumonia on BAL (11/1/2024)  - s/p high-dose bactrim per ID recommendation (21 day treatment), finished atovaquone 12/9 and now on PO bactrim 3x/week for prophy  Rhinovirus infection 11/18/2024  Hypogammaglobulinemia with recurrent infections: IgG 534 (12/18/24); not repleted.   Prophylaxis   - ACV Patient developed oral herpes reactivation after stopping acyclovir in Jan 2023  - Penicillin 250 mg BID due to atrophic spleen "   Vaccinations  Completed 1Y and 2Y vaccination series.  Received his influenza/COVID vaccine 9/24/2024 and RSV vaccine 8/13.   Complete pneumococcal and HiB vaccine. Recommend meningococcal vaccine once infection resolved.    Renal  History of RCC s/p R nephrectomy in 2007.  Has neph-onc follow up 1/7/2025.    CKD:  patient/wife state this is medication related and started since his transplant.  Creatinine had been around 1.3, but thalia to a new baseline of 1.8-2 with addition of high dose Bactrim.    Now improved off high dose steroids.     Endo:   - Hx of graves disease; On synthroid follows with endocrine    CV:   - Hypertension; On amlodipine (on hold with hypotension).  - HLD; On rosuvastatin    Psych:   - Situational anxiety; Lexapro 10mg daily.  Ativan 1mg po q 6 hours prn anxiety.     Final plan:  CXR ok  Repeat RVP, covid/flu--have asked my colleague to f/up and likely treat if pos for flu or covid  Supportive care--tylenol prn, cough suppressant, push PO intake    Return to clinic 1/7, sooner prn    I spent 30 minutes in the care of this patient today, which included time necessary for preparation for the visit, obtaining history, ordering medications/tests/procedures as medically indicated, review of pertinent medical literature, counseling of the patient, communication of recommendations to the care team, and documentation time.    Lashanda Figueroa pa-c  [phone]        Again, thank you for allowing me to participate in the care of your patient.        Sincerely,        BMT Advanced Practice Provider    Electronically signed

## 2024-12-31 NOTE — TELEPHONE ENCOUNTER
BMT Nurse Triage - Generic/Other Symptoms     Treating Provider:   Dr. Cote    Date of last office visit: 12/24    Onset of symptoms: Last night 12/30    Brief description of symptoms: Last night pt reports worsening cough and low grade fever. At 5 am pt reported 102 fever @ 5am, pt took tylenol and got temp down to 99.7. @ 11am temp went back up to 101.0. He reports feeling lightheaded, achy, and tired. He reports not eating a ton, but is drinking fluid.     APPs would like patient to come in for chest xray, labs, and provider visit. This was communicated with pt and they will be expecting a call from scheduling with times to be at the clinic.

## 2024-12-31 NOTE — NURSING NOTE
"Oncology Rooming Note    December 31, 2024 2:24 PM   Nik Nava is a 64 year old male who presents for:    Chief Complaint   Patient presents with    Port Draw     Labs drawn from port byrn.  VS taken.    Oncology Clinic Visit     Acute myeloid leukemia      Initial Vitals: BP 99/63 (BP Location: Right arm, Patient Position: Standing, Cuff Size: Adult Large)   Pulse 117   Temp 99.4  F (37.4  C) (Oral)   Resp 16   Wt 115.2 kg (254 lb)   SpO2 97%   BMI 33.51 kg/m   Estimated body mass index is 33.51 kg/m  as calculated from the following:    Height as of 12/18/24: 1.854 m (6' 1\").    Weight as of this encounter: 115.2 kg (254 lb). Body surface area is 2.44 meters squared.  Mild Pain (3) Comment: Data Unavailable   No LMP for male patient.  Allergies reviewed: Yes  Medications reviewed: Yes    Medications: Medication refills not needed today.  Pharmacy name entered into [employer]:    [facility] - [place], [place] - [address] [facility] [place], [place] - [address] [place] [address] [phone]  [facility]  [facility] - [place], [place] - [address] [place] [address]    Frailty Screening:   Is the patient here for a new oncology consult visit in cancer care? 2. No      Clinical concerns: Cough and fever since yesterday      Susanne Mehta              "

## 2025-01-01 DIAGNOSIS — J10.1 INFLUENZA A: Primary | ICD-10-CM

## 2025-01-01 RX ORDER — OSELTAMIVIR PHOSPHATE 75 MG/1
75 CAPSULE ORAL 2 TIMES DAILY
Qty: 20 CAPSULE | Refills: 0 | Status: SHIPPED | OUTPATIENT
Start: 2025-01-01 | End: 2025-01-11

## 2025-01-01 NOTE — PROGRESS NOTES
Encounter opened to review viral swabs completed yesterday. Noted to test positive for Influenza A, Influenza A H1N1, and parainfluenza.     Reviewed most recent lab work, noted CrCl at 72.1, discussed with inpatient pharmacist given recent decreased CrCl. Ok to proceed with full dose tamiflu     Sent Tamiflu 75mg BID for 10 days to [facility] after discussion with his wife. She states he continues to have fevers and feeling poorly. Poor PO intake, but otherwise vitally stable. Discussed potential side effects with tamiflu including upset stomach, nausea and vomiting. Wife given 5C  number to call back should she have any questions.     MIREYA Cao

## 2025-01-06 ENCOUNTER — DOCUMENTATION ONLY (OUTPATIENT)
Dept: ONCOLOGY | Facility: CLINIC | Age: 65
End: 2025-01-06
Payer: MEDICARE

## 2025-01-06 NOTE — PROGRESS NOTES
[facility] Cancer Care Oral Chemotherapy Monitoring Program    Thank you for the opportunity to be a part in the care of this patient's oral chemotherapy. The oncology pharmacy will no longer be following this patient for oral chemotherapy. If there are any questions or the plan changes, feel free to contact us.        12/11/2024    11:00 AM 12/11/2024     4:00 PM 12/26/2024    11:00 AM 1/6/2025    10:00 AM   ORAL CHEMOTHERAPY   Assessment Type Initial Work up New Teach Lab Monitoring;Initial Follow up Discontinuation   Stop Date    1/3/2025   Reason for Discontinuation    Unacceptable toxicity   Diagnosis Code Graft Versus Host Disease (GVHD) - Chronic Graft Versus Host Disease (GVHD) - Chronic Graft Versus Host Disease (GVHD) - Chronic Graft Versus Host Disease (GVHD) - Chronic   Providers Kera Cote   [facility] Name/Location [facility]   Is this patient followed by the [facility] OC team? No No No No   Drug Name Jakafi (ruxolitinib) Jakafi (ruxolitinib) Jakafi (ruxolitinib) Jakafi (ruxolitinib)   Dose 5 mg 5 mg 5 mg 5 mg   Current Schedule BID BID BID BID   Cycle Details Continuous Continuous Continuous Continuous       Kurt Lindsay, Hoa  Oral Chemotherapy Monitoring Program  [facility]  [phone]

## 2025-01-07 ENCOUNTER — ONCOLOGY VISIT (OUTPATIENT)
Dept: TRANSPLANT | Facility: CLINIC | Age: 65
End: 2025-01-07
Attending: INTERNAL MEDICINE
Payer: MEDICARE

## 2025-01-07 ENCOUNTER — ANCILLARY PROCEDURE (OUTPATIENT)
Dept: GENERAL RADIOLOGY | Facility: CLINIC | Age: 65
End: 2025-01-07
Attending: PHYSICIAN ASSISTANT
Payer: MEDICARE

## 2025-01-07 ENCOUNTER — OFFICE VISIT (OUTPATIENT)
Dept: NEPHROLOGY | Facility: CLINIC | Age: 65
End: 2025-01-07
Attending: INTERNAL MEDICINE
Payer: MEDICARE

## 2025-01-07 VITALS
SYSTOLIC BLOOD PRESSURE: 124 MMHG | OXYGEN SATURATION: 97 % | HEART RATE: 74 BPM | BODY MASS INDEX: 34.43 KG/M2 | TEMPERATURE: 97.6 F | DIASTOLIC BLOOD PRESSURE: 78 MMHG | WEIGHT: 261 LBS

## 2025-01-07 VITALS
WEIGHT: 261.7 LBS | SYSTOLIC BLOOD PRESSURE: 124 MMHG | RESPIRATION RATE: 16 BRPM | TEMPERATURE: 97.6 F | HEART RATE: 74 BPM | OXYGEN SATURATION: 97 % | BODY MASS INDEX: 34.53 KG/M2 | DIASTOLIC BLOOD PRESSURE: 78 MMHG

## 2025-01-07 DIAGNOSIS — J10.1 INFLUENZA A: Primary | ICD-10-CM

## 2025-01-07 DIAGNOSIS — D89.813 GVHD AS COMPLICATION OF BONE MARROW TRANSPLANT (H): ICD-10-CM

## 2025-01-07 DIAGNOSIS — T86.09 GVHD AS COMPLICATION OF BONE MARROW TRANSPLANT (H): ICD-10-CM

## 2025-01-07 DIAGNOSIS — N18.2 CKD (CHRONIC KIDNEY DISEASE) STAGE 2, GFR 60-89 ML/MIN: ICD-10-CM

## 2025-01-07 DIAGNOSIS — J10.1 INFLUENZA A: ICD-10-CM

## 2025-01-07 DIAGNOSIS — C91.01 ACUTE LYMPHOBLASTIC LEUKEMIA (ALL) IN REMISSION (H): ICD-10-CM

## 2025-01-07 DIAGNOSIS — N18.2 CKD (CHRONIC KIDNEY DISEASE) STAGE 2, GFR 60-89 ML/MIN: Primary | ICD-10-CM

## 2025-01-07 DIAGNOSIS — I10 HYPERTENSION, ESSENTIAL: ICD-10-CM

## 2025-01-07 DIAGNOSIS — C91.01 ACUTE LYMPHOBLASTIC LEUKEMIA (ALL) IN REMISSION (H): Primary | ICD-10-CM

## 2025-01-07 LAB
ALBUMIN MFR UR ELPH: 23.6 MG/DL
ALBUMIN UR-MCNC: 20 MG/DL
APPEARANCE UR: CLEAR
BILIRUB UR QL STRIP: NEGATIVE
COLOR UR AUTO: YELLOW
CREAT UR-MCNC: 96.6 MG/DL
CREAT UR-MCNC: 97.8 MG/DL
GLUCOSE UR STRIP-MCNC: NEGATIVE MG/DL
HGB UR QL STRIP: NEGATIVE
HYALINE CASTS: 1 /LPF
KETONES UR STRIP-MCNC: NEGATIVE MG/DL
LEUKOCYTE ESTERASE UR QL STRIP: NEGATIVE
MICROALBUMIN UR-MCNC: 99.9 MG/L
MICROALBUMIN/CREAT UR: 102.15 MG/G CR (ref 0–17)
MUCOUS THREADS #/AREA URNS LPF: PRESENT /LPF
NITRATE UR QL: NEGATIVE
PH UR STRIP: 6 [PH] (ref 5–7)
PHOSPHATE SERPL-MCNC: 2.9 MG/DL (ref 2.5–4.5)
PROT/CREAT 24H UR: 0.24 MG/MG CR (ref 0–0.2)
RBC URINE: 0 /HPF
SP GR UR STRIP: 1.02 (ref 1–1.03)
UROBILINOGEN UR STRIP-MCNC: NORMAL MG/DL
WBC URINE: <1 /HPF

## 2025-01-07 PROCEDURE — G2211 COMPLEX E/M VISIT ADD ON: HCPCS | Performed by: INTERNAL MEDICINE

## 2025-01-07 PROCEDURE — 82784 ASSAY IGA/IGD/IGG/IGM EACH: CPT

## 2025-01-07 PROCEDURE — 84100 ASSAY OF PHOSPHORUS: CPT

## 2025-01-07 PROCEDURE — 81001 URINALYSIS AUTO W/SCOPE: CPT | Performed by: INTERNAL MEDICINE

## 2025-01-07 PROCEDURE — 84156 ASSAY OF PROTEIN URINE: CPT | Performed by: INTERNAL MEDICINE

## 2025-01-07 PROCEDURE — 99215 OFFICE O/P EST HI 40 MIN: CPT | Performed by: INTERNAL MEDICINE

## 2025-01-07 PROCEDURE — G0463 HOSPITAL OUTPT CLINIC VISIT: HCPCS

## 2025-01-07 PROCEDURE — 99214 OFFICE O/P EST MOD 30 MIN: CPT

## 2025-01-07 PROCEDURE — G0463 HOSPITAL OUTPT CLINIC VISIT: HCPCS | Mod: 27 | Performed by: INTERNAL MEDICINE

## 2025-01-07 PROCEDURE — 71046 X-RAY EXAM CHEST 2 VIEWS: CPT | Mod: GC | Performed by: RADIOLOGY

## 2025-01-07 PROCEDURE — 82043 UR ALBUMIN QUANTITATIVE: CPT | Performed by: INTERNAL MEDICINE

## 2025-01-07 PROCEDURE — 36591 DRAW BLOOD OFF VENOUS DEVICE: CPT

## 2025-01-07 PROCEDURE — 250N000011 HC RX IP 250 OP 636: Performed by: INTERNAL MEDICINE

## 2025-01-07 RX ORDER — HEPARIN SODIUM (PORCINE) LOCK FLUSH IV SOLN 100 UNIT/ML 100 UNIT/ML
5 SOLUTION INTRAVENOUS
Status: COMPLETED | OUTPATIENT
Start: 2025-01-07 | End: 2025-01-07

## 2025-01-07 RX ORDER — ALBUTEROL SULFATE 90 UG/1
2 INHALANT RESPIRATORY (INHALATION) EVERY 6 HOURS PRN
Qty: 18 G | Refills: 0 | Status: SHIPPED | OUTPATIENT
Start: 2025-01-07

## 2025-01-07 RX ADMIN — HEPARIN 3 ML: 100 SYRINGE at 12:46

## 2025-01-07 ASSESSMENT — PAIN SCALES - GENERAL
PAINLEVEL_OUTOF10: NO PAIN (0)
PAINLEVEL_OUTOF10: NO PAIN (0)

## 2025-01-07 NOTE — NURSING NOTE
"Chief Complaint   Patient presents with    Port Draw     Labs drawn from port by rn.  VS taken.     Port accessed with 20 gauge 3/4\" Power needle and labs drawn by rn.  Port flushed with NS and heparin.  Pt tolerated well.  VS taken.  Pt checked in for next appt.    Argenis Watkins RN      "

## 2025-01-07 NOTE — PATIENT INSTRUCTIONS
Kidney function has improved  Stay well hydrated  Try to use compression stocking and if things does not get better please let us know, we may try low dose water pills  See you 4 months with labs

## 2025-01-07 NOTE — PROGRESS NOTES
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     Recently with diagnosis of PJP pneumonia. 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    HPI:  Nik is feeling tired but slightly better since starting the tamiflu course. His cough initially improved but is now worse with the cough, mostly at night. He continues to use robitussin throughout the day. He has not had any recurrent fevers and O2 sats are good. He had a CXR last week which was negative but with the worsening cough, hx of pneumonia, immunocompromised and some crackles in his left lower lung fields - we will get a CXR to rule out a recurrent pneumonia today.    He continues to be off Jakafi but has not noted a significant improvement of fatigue likely due to this current illness.     He continues to eat and drink pretty well. No nausea, vomiting or diarrhea.    ROS: Review of systems was negative except as detailed above     PHYSICAL EXAM          Blood pressure 124/78, pulse 74, temperature 97.6  F (36.4  C), temperature source Oral, resp. rate 16, weight 118.7 kg (261 lb 11.2 oz), SpO2 97%.    Wt Readings from Last 4 Encounters:   01/07/25 118.7 kg (261 lb 11.2 oz)   12/31/24 115.2 kg (254 lb)   12/24/24 115.9 kg (255 lb 9.6 oz)   12/20/24 116.7 kg (257 lb 4.8 oz)      KPS: 70    General: NAD.  HEENT: sclera anicteric. Lichenoid changes and associated erythema of b/l buccal mucosa; right buccal mucosa is a bit roughened, but otherwise no open sores.  No active ulceration. Mild OP erythema.    Lungs: crackles in left lower base with intermittent wheezing through all lung fields  CV: RRR, HR 80s on exam     Ext/Skin: no rash      Chronic GVHD          12/24/2024    11:48 12/11/2024    09:07 10/27/2024    09:21   Chronic GVHD   Has chronic hpspr-plneyo-ijts disease developed since the last entry? No No No   Is there persistence of chronic  ygcvl-iamutb-maou disease since the last entry? Yes Yes Yes   Chronic Wvynu-Ucmfqw-Iyyq Disease Global Severity Score   Moderate   Total Chronic Qhukr-Vhbdqz-Jpar Disease Score 2 2 3   Subjective Clinician Opinion of Severity Mild Mild Mild       LABORATORY STUDIES:       Lab Results   Component Value Date    WBC 8.1 01/07/2025    ANEU 3.5 10/26/2021    HGB 11.2 (L) 01/07/2025    HCT 32.7 (L) 01/07/2025    PLT 71 (L) 01/07/2025     12/31/2024    POTASSIUM 4.7 12/31/2024    CHLORIDE 103 12/31/2024    CO2 23 12/31/2024     (H) 12/31/2024    BUN 28.6 (H) 12/31/2024    CR 1.34 (H) 12/31/2024    MAG 1.9 12/20/2024    INR 0.92 12/18/2024     ASSESSMENT AND PLAN   Nik Nava is a 64 year old male with Ph Pos ALL, 3y s/p sib allo stem cell transplant. Admitted on 12/18/24 with persistent fevers and hypotension.     Acute lymphoblastic leukemia, [place] chromosome positive. S/p VERÓNICA allo sib PBSCT   2Y restage consistent with MRD negative CR. BM % donor. FISH No evidence of BCR::ABL1 fusion. CG No recipient (male) cells or cells with a t(9;22) or other clonal chromosomal abnormality were detected among the 20 metaphases analyzed.    BCRABL monitoring quarterly    HEME:   Anemia of chronic disease  Thombocytopenia due to infection ; now improving despite being on jakafi, which is reassuring.  Not currently needing transfusions, but will keep hgb > 7 and plts > 10k if needed.     GVHD hx:  Skin GVHD - history of biopsy proven GVHD of the skin 8/30/2021; resolved.   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   Ocular GVHD - NIH score 1. Follows with ophthalmology, last visit 10/11/2024.  - Currently using scleral lenses and PF AT.    Oral GVHD - NIH score 1  - Lichenoid change of L buccal mucosa. Dexamethasone s/s prn    Current Systemic treatment for GVHD  Corticosteroids - On steroid chronically due to frequent flare of GVH with taper. Current dose prior to admission was 15mg every other day  "alternating with 10mg every other day.  He confirms he is back to 10/15 alternating every other day.      Previous therapies:  Tacrolimus - Experienced mild NEGRA, hyperkalemia, hypomagnesemia, and reports some tremors and cramps. Switched to sirolimus Sep 2022.  Sirolimus - Discontinued Oct 2022 due to GVH flare and significant fluid retention and proteinuria.  Belumosudil - Patient intolerant/with disease flares on tacrolimus and sirolimus as above. This was started on Oct 2022 - skin/liver/oral GVHD inactive, and occular symptoms controlled/symptomatic improvement. Discontinued Oct 2023 due to recurrent infections.  Ruxolitinib - Dec 2024: 12/13 started 5 mg twice daily, with a plan to continue in order to taper prednisone down to 5 mg daily or equivalent or below. He is felt really poorly since starting jakafi, however (cough, nausea, fatigue, \"wooziness\").  Jakafi stopped (last dose 12/26 AM)    ID:   # Immunocompromised secondary to GVHD medications  # Influenza A, Influenza A H1N1, Parainfluenza: Started 10 day tx of Tamiflu on 1/1 -- coughing worsening over the last 24-48hrs. Will repeat CXR today. Also sent in Rx for albuterol inhaler.    Recent infectious hx:  # Atypical pneumonia:  completed levaquin 750mg daily x 7 days; then resume penicillin prophylaxis 12/26.   # PJP pneumonia on BAL (11/1/2024)  - s/p high-dose bactrim per ID recommendation (21 day treatment), finished atovaquone 12/9   - now on prophy (see below)  # Rhinovirus infection 11/18/2024    # Hypogammaglobulinemia with recurrent infections: IgG 534 (12/18/24); not repleted.     Prophylaxis   - ACV Patient developed oral herpes reactivation after stopping acyclovir in Jan 2023  - Penicillin 250 mg BID due to atrophic spleen   - PO bactrim 3x/week (hx of CKD)    Vaccinations  Completed 1Y and 2Y vaccination series.  Received his influenza/COVID vaccine 9/24/2024 and RSV vaccine 8/13.   Complete pneumococcal and HiB vaccine. Recommend " meningococcal vaccine once infection resolved.    Renal  # History of RCC s/p R nephrectomy in 2007.  Has neph-onc follow up 1/7/2025.    # CKD:  patient/wife state this is medication related and started since his transplant.  Creatinine had been around 1.3, but thalia to a new baseline of 1.8-2 with addition of high dose Bactrim.    Now improved off high dose steroids.     Endo:   # Hx of graves disease; On synthroid follows with endocrine    CV:   # Hypertension; On amlodipine (on hold with hypotension).  # HLD; On rosuvastatin    Psych:   # Situational anxiety; Lexapro 10mg daily.  Ativan 1mg po q 6 hours prn anxiety.     Today's Summary:  CXR today - APP4 will follow up on results for need for possible abx  Albuterol inhaler sent to local pharmacy  Complete Tamiflu course    RTC (1/17) Dr Kera KEEN spent 35 minutes in the care of this patient today, which included time necessary for preparation for the visit, obtaining history, ordering medications/tests/procedures as medically indicated, review of pertinent medical literature, counseling of the patient, communication of recommendations to the care team, and documentation time.    Moris Ortiz PA-C

## 2025-01-07 NOTE — PROGRESS NOTES
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      Oncologic history: The patient was diagnosed with Ph+ ALL in 2020 after presented with feeling unwell and found to have leukocytosis and blast in his blood. He was treated with Dex and Dasatinib then 2 cycles of vincristine, prednisone, daunorubicin, and pegasparaginase with intrathecal methotrexate. Last ly, he received 1 course of high dose Jaymie-C. He achieved CR with no MRD. Subsequently, he underwent allo sib NMA (flu/cy+TBI) PBSCT on  8/10/21. hematocrit-CI was 3.  The patient was noted to be in CR with BmBx at D100, D180 and 1Y showing 100% donor. He has not been started on TKI due to previous multiple active issues. Post Tx complication included cGVHD at skin, eyes, o liver (all Bx proven, except eyes, clinically) started since 2/22. The current active cGVHDD involved eyes and skin. He was on Tacrolimus for cGVHD but later discontinued due to NEGRA, hyperkalemia hypomagnesemia, tremors and cramps in 9/22. Sirolimus was started in 9/21/22 in replacement of Tac. Cr improved, but the presented on 10/11/22 with ocular and cGVHD flare, fluid retention and gain 5-6 kg. BMT thought this is sirolimus Tox? (of note UPCR was only 0.4 and normal SAlb). Sirolimus was then stopped. Prednisone was started at 40 mg with slow tapering. He was also started on Belumosudil on 10/18/22 (ROCK2 inhibitors:Rho-associated coiled-coil kinase )  Other pertinent Hx:   - He had PE in the setting of receiving PEG asparaginase and was treated with Xarelto which is now completed.   - He had hyperferritinemia which now being followed closely. Not on iron chelators.  - He had COVID -19 in 5/22.  - CMV viremia  - Grave  no disease  - HTN  Kidney history: He had RCC s/p Rt nephrectomy in 2007.  The patient has baseline creatinine of 0.9-1 pretransplant.  He developed NEGRA in 9/21 with Cr peaked at 1.89 but then come down to baseline. He had NEGRA again in 9/22 with Cr peaked at 1.8, suspect that due to tacrolimus toxicity and it was stopped. Cr improved but now still fluctuates between 1.1-1.3.  The most recent creatinine was on 10/31/2022 at 1.08.  Serum albumin is 3.3. UCPR is 0.39 g/g on 10/18/22. UA on 9/12/21 and 10/18/22 showed no protein, no blood.  However UA on 9/12/2021 showed 8 hyaline cast.      11/1/22: He is doing well except that he still has LE edema. It has improved from when he was on Sirolimus but still quite a bit. Edema has actually started since 2021 but unclear when.But it is certainly getting worse lately when he was treated with high dose steroid and sirolimus.His cGVHD is o/eva all stable but not completely gone. He was just started Belumosudil on 10/18/22 for cGVHD. He just saw BMT today and they are decreasing his steroid.Amlodipine started since 8/21 and possibly related to his swelling. He has multiple SEs from steroid such as weight gain , central obesity, easily bruised, increased appetite and partial insomnia. He has some nocturia. He has no dysuria or hematuria. Recently, he had cat scrath incidence and being treated with doxycycline. He has abdominal distension and gained weight progressively. He drinks 120 Oz of tea per day. We started chlorthalidone and reduced amlodipine.   12/1-12/3/22: Was admitted for dyspnea and malaise concerning for belumosudil intolerance. However, at the same time he also had flu A positive. He was taken off of amlodipine on 12/6/22. And subsequently chlorthalidone was off in 12/13/22 when Na was 128. Na improved afterwards.   1/10/23:  He feels good today. His BP are now very good 110-130/70 mmHg. He is not on any BP medication ayt this time. He is taking Belumosidil.  Symptoms of cGVHD have been improving.  He has some LE edema and easily bruised likely from steroid/belumosudil.   Labs: Creatinine 1.11, potassium 4.8,, dioxide 28, phosphorus 2.3, albumin 4.2, calcium 10.1.  Hemoglobin 11.3, platelet 140. UA pending.   6/13/23: In the interim, he went to the ED on 5/14/23 for testicular pain. CT showed no hernia or mass. Hew was also diagnosed with spine osteomyelitis and Epidural abscess and being treated with Ceftraixone for 6 weeks. Noted stable cGVHD. He also had COVID in 1/23. Now on physiologica dose of steroid. Still has dry eyes and a bit of mouth sore.  He drinks about 3-4 glasses of milk per day. He still has back pain that radiating downt to right leg. He completed ceftriaxone in 5/25/23. Appetite is stable as well as his weight. He has minimal swelling. He is on prednisone 4 mg alternate with 10 mg per day. Labs 6/13/23: Cr 1.10, BUN 46.2, K 5.1, Calcium 10.3, Phos 3.1, Albumin 4.0. Hb 11.7, Platelet 182, WBC 10.1.   10/16/23: In interim, he was diagnosed with L5-S1 discitis osteomyelitis at [facility] after presented with ongoing back pain.  MRI showing findings consistent with ongoing discitis-osteomyelitis at L5-S1 which has progressed since last imaging in June. He was seen by ID and neurosurgery. He underwent disc aspiration.  ID is recommending another 6 weeks course of antibiotics with a different regimen: Invanz and vancomycin. Neurosurgery did not recommend any surgical intervention at this time. Vanco dose was reduced on 10/10/23 due to kidney function and stopped last Thursday when Cr was 1.87. Lately, his blood pressure has been increasing to 150-160 and sometimes 180. I asked him to resume amlodipine 5 mg.  After stopping vancomycin, BP has improved along with his appetite. Cr has improved  to 1.68 on 10/16/23 ([facility] labs). He has gained some weight. His swelling was worse when he was admitted in the hospital. Now he is on Ertapenenm and clindamycin  oral until 10/23. Pain is mostly when he lays too long and going to get up. He is still using fentanyl patch but the dose was reduced. Belumosidil was just on hold.  Labs on 10/10/2023 showed creatinine 1.56 but Cr 1.678 on 10/16/23 ([facility] lab), BUN 26, GFR 49, potassium 3.5,, dioxide 28, calcium 10.2, albumin 4.1, alkaline phosphatase 141.  CBC show white blood cell 13, hemoglobin 10.9, platelet 213. Vancomycin at [facility] was 21.4 on 10/9/23. CRP 2.2 and ESR 46. UA on 10/12/23 showed no blood but UPCR is 0.2 g/g.   11/14/23: He has been on Augmentin and Doxycyline plan until end of this month . Clindamycin upsets his currently due to main complaint is low energy and restless leg syndrome that started about 2 weeks ago.  He recently stopped taking magnesium and iron a month ago.  Stomach. He has minor swelling. He is off of fentanyl 2 weeks ago. BP has been in the 120-130/80s. His Cr has improved and from [facility] lab, Cr 1.26 on 11/6/23. He has been eating and drinking well. He is on prednisone. He started to have restless leg.  Labs today show WBC 11.5, Hb 11.6.  Creatinine 1.4, EGFR 56, potassium 4.8, bicarb 24.  calcium 10.1 albumin 4.4.  CRP less than 3, sed rate 27.  UA showed protein 50 mg/dL.  Platelets of 2, RBC less than 1.UPCR pending.   2/20/24: He has relapsed of oral GVHD and prednisone was increased to 40 mg/d and now on tapering dose. He is also taking valtrex which has helped with HSV. In the meantime, his BP were in the 140-150 so chlorthalidone 12.5 mg was added at the end of January 2024. He also was admitted at the end of January for fever, chest pain but all work-up were unrevealing. He was treated him with Doxycycline for 14 days and now he feels better. Labs today showed Na 133, K 4.6, Bicarb 25, Cr 1.06 and BUN 40.6. CBC showed WBC 11.7, Hb 12.6. UA is bland. UPCR 0.27 g/g.   7/9/24: In the interim, he stopped chlorthalidone on 6/17/24 due to low BP and no swelling. He was hospitalized for PNA  between 5/28-6/1 and was initially given IV fluids and IV antibiotics but then transitioned to complete a 10-day course of cefdinir. Later on he has right leg pain, he presented to a local ER on June 11.  Venous Doppler US showed no DVT.  He briefly took Tylenol and ibuprofen for pain control.  Today, he has no fever but a bit of leg swelling. He wears compression stocking. He is on prednisone 10 milligram alternate 15 mg for GVHD.  Patient denies of any unexpected weight gain or weight loss, chest pain, shortness of breath, nausea, vomiting, change in urine output.  Labs today shows Cr 1.32, , K4.2, WBC 11.5, hemoglobin 11.8, UA spec gravity 1.031, urine protein albumin 70mg/dL, protein/creat urine 0.16.    1/7/2025: In the interim, the patient was admitted for PJP pneumonia and was treated with Bactrim IV for 3 weeks and now on oral Bactrim 3 times per weeks. Later on, he developed parainfluenza URI and admitted again in mid December 2024.  He was started on Jakafi on 12/13/24.  He had volume depletion with shock, BP in the 60s. He was also diagnosed with influenza A on 12/31/24 and was started on Tamiflu. Jakafi stopped on 12/27/24 due to hypotension. He is on 15 mg alternating with 10 mg daily. Today, still has some cough and weak. No fever or SOB. No problem with swelling. He has no proble with urination.   Labs today showed Cr 1.23, eGFR 65, K 4.4, CO2 23, Ca 9.6, Phos 2.9, albumin 4.0.  Hemoglobin 11.2, platelets 71.  UA showed no blood. UPCR 0.24 g/g.     Past medical history  Past Medical History:   Diagnosis Date    ALL (acute lymphocytic leukemia) (H)     CKD (chronic kidney disease)     GVHD (graft versus host disease) (H)     H/O peripheral stem cell transplant (H)     Hyperthyroidism 1996    Infection due to 2019 novel coronavirus 01/16/2023    Infection due to 2019 novel coronavirus 05/18/2022    Influenza A 11/2022    Postablative hypothyroidism 1997    Pulmonary embolism (H)     Renal cell  carcinoma (H) 2007    right kidney    Sleep apnea        Past surgical history  Past Surgical History:   Procedure Laterality Date    APPENDECTOMY      BACK SURGERY  2017    BONE MARROW BIOPSY, BONE SPECIMEN, NEEDLE/TROCAR Left 09/02/2021    Procedure: BIOPSY, BONE MARROW;  Surgeon: Jailyn Ny APRN CNP;  Location: [facility] OR    BONE MARROW BIOPSY, BONE SPECIMEN, NEEDLE/TROCAR Left 11/15/2021    Procedure: BIOPSY, BONE MARROW;  Surgeon: Socrates De La Torre;  Location: [facility] OR    BONE MARROW BIOPSY, BONE SPECIMEN, NEEDLE/TROCAR Left 02/07/2022    Procedure: BIOPSY, BONE MARROW;  Surgeon: Renetta Wiggins PA-C;  Location: [facility] OR    BONE MARROW BIOPSY, BONE SPECIMEN, NEEDLE/TROCAR Left 08/18/2022    Procedure: BIOPSY, BONE MARROW;  Surgeon: Lorrie Yap PA-C;  Location: [facility] OR    BONE MARROW BIOPSY, BONE SPECIMEN, NEEDLE/TROCAR Left 08/07/2023    Procedure: Bone marrow biopsy, bone specimen, needle/trocar;  Surgeon: Teressa Rick PA-C;  Location: [facility] OR    BRONCHOSCOPY (RIGID OR FLEXIBLE), DIAGNOSTIC N/A 04/29/2022    Procedure: BRONCHOSCOPY, WITH BRONCHOALVEOLAR LAVAGE;  Surgeon: Murtaza Garcia MD;  Location: [facility]    BRONCHOSCOPY (RIGID OR FLEXIBLE), DIAGNOSTIC N/A 11/1/2024    Procedure: BRONCHOSCOPY, WITH BRONCHOALVEOLAR LAVAGE;  Surgeon: Murtaza Garcia MD;  Location: [facility]    IR CVC TUNNEL PLACEMENT > 5 YRS OF AGE  08/04/2021    IR CVC TUNNEL REMOVAL LEFT  09/02/2021    IR LIVER BIOPSY PERCUTANEOUS  02/21/2022    IR LUMBAR PUNCTURE  1/21/2021    NEPHRECTOMY  2007    ORTHOPEDIC SURGERY      bilateral shoulder surgeries    PERCUTANEOUS BIOPSY LIVER N/A 02/21/2022    Procedure: NEEDLE BIOPSY, LIVER, PERCUTANEOUS;  Surgeon: Trace Castellanos MD;  Location: [facility] OR    PHACOEMULSIFICATION WITH STANDARD INTRAOCULAR LENS IMPLANT Left 1/15/2024    Procedure: LEFT EYE PHACOEMULSIFICATION, CATARACT, WITH STANDARD INTRAOCULAR LENS IMPLANT INSERTION;  Surgeon: Deshawn Menezes MD;   Location: [facility] OR    PHACOEMULSIFICATION WITH STANDARD INTRAOCULAR LENS IMPLANT Right 2/5/2024    Procedure: RIGHT EYE PHACOEMULSIFICATION, CATARACT, WITH STANDARD INTRAOCULAR LENS IMPLANT INSERTION;  Surgeon: Deshawn Menezes MD;  Location: [facility] OR       Review of Systems:   14 systems were reviewed and all negative except as mentioned above.   Current Medications:  Current Outpatient Medications   Medication Sig Dispense Refill    acyclovir (ZOVIRAX) 400 MG tablet Take 800 mg by mouth every 12 hours. Taking 800 mg BID      dexAMETHasone alcohol-free (DECADRON) 0.1 MG/ML solution Swish and spit 10 mLs (1 mg) in mouth 2 times daily. 500 mL 2    escitalopram (LEXAPRO) 10 MG tablet Take 1 tablet (10 mg) by mouth daily 30 tablet 3    levothyroxine (SYNTHROID/LEVOTHROID) 112 MCG tablet Take 1 tablet (112 mcg) by mouth daily. 90 tablet 4    LORazepam (ATIVAN) 1 MG tablet Take 1 mg by mouth every 6 hours as needed for anxiety. PRN      metroNIDAZOLE (METROCREAM) 0.75 % external cream Apply topically 2 times daily Apply to the nose. 45 g 3    nicotine polacrilex (NICORETTE) 4 MG gum Place 4 mg inside cheek daily.      Nutritional Supplements (ENSURE COMPLETE SHAKE) LIQD Take 11 mLs by mouth every morning      omeprazole (PRILOSEC) 40 MG DR capsule Take 1 capsule (40 mg) by mouth daily 30 capsule 3    oseltamivir (TAMIFLU) 75 MG capsule Take 1 capsule (75 mg) by mouth 2 times daily for 10 days. 20 capsule 0    penicillin V (VEETID) 250 MG tablet Take 1 tablet (250 mg) by mouth 2 times daily.      polyethylene glycol (MIRALAX) 17 GM/Dose powder Take by mouth.      predniSONE (DELTASONE) 10 MG tablet Take 1 tablet (10 mg) by mouth daily. Alternates 10 and 15 mg every other day      predniSONE (DELTASONE) 5 MG tablet Take 1 tablet (5 mg) by mouth daily. Use combined with 10mg strength tab for a dose of 15mg alternating with 10mg every other day.      rosuvastatin (CRESTOR) 5 MG tablet Take 1 tablet (5 mg) by mouth daily.  30 tablet 3    sennosides (SENOKOT) 8.6 MG tablet Take 1 tablet by mouth 3 times daily as needed for constipation 90 tablet 0    sulfamethoxazole-trimethoprim (BACTRIM DS) 800-160 MG tablet Take 1 tablet by mouth Every Mon, Wed, Fri Morning.      tacrolimus (PROTOPIC) 0.1 % external ointment Apply topically 2 times daily 30 g 1     No current facility-administered medications for this visit.       Physical Exam:   There were no vitals taken for this visit.   There is no height or weight on file to calculate BMI.  There were no vitals taken for this visit.     GENERAL APPEARANCE: Alert, not in acute distress; pleasant. + Cushingoid appearance.   EYES:  Not pale conjunctiva, pupils equal  HENT: Mouth without ulcers or lesions  PULM: lungs clear to auscultation bilaterally, equal air movement, no clubbing  CV: regular rhythm, normal rate, no rub     -JVD no distended.      -edema: No edema BLE.   GI: soft,  - tender, no distended, bowel sounds are present  INTEGUMENT: No rashes.   NEURO:  Non focal. No asterixis.     Labs:   All labs reviewed by me  Last Renal Panel:  Sodium   Date Value Ref Range Status   12/31/2024 136 135 - 145 mmol/L Final   07/08/2021 139 133 - 144 mmol/L Final     Potassium   Date Value Ref Range Status   12/31/2024 4.7 3.4 - 5.3 mmol/L Final   11/15/2022 5.0 3.4 - 5.3 mmol/L Final   07/08/2021 3.9 3.4 - 5.3 mmol/L Final     Potassium POCT   Date Value Ref Range Status   12/01/2022 4.8 3.4 - 5.3 mmol/L Final     Chloride   Date Value Ref Range Status   12/31/2024 103 98 - 107 mmol/L Final   11/15/2022 101 94 - 109 mmol/L Final   07/08/2021 108 94 - 109 mmol/L Final     Carbon Dioxide   Date Value Ref Range Status   07/08/2021 23 20 - 32 mmol/L Final     Carbon Dioxide (CO2)   Date Value Ref Range Status   12/31/2024 23 22 - 29 mmol/L Final   11/15/2022 30 20 - 32 mmol/L Final     Anion Gap   Date Value Ref Range Status   12/31/2024 10 7 - 15 mmol/L Final   11/15/2022 3 3 - 14 mmol/L Final    07/08/2021 7 3 - 14 mmol/L Final     Glucose   Date Value Ref Range Status   12/31/2024 104 (H) 70 - 99 mg/dL Final   11/15/2022 113 (H) 70 - 99 mg/dL Final   07/08/2021 107 (H) 70 - 99 mg/dL Final     GLUCOSE BY METER POCT   Date Value Ref Range Status   11/27/2024 124 (H) 70 - 99 mg/dL Final     Urea Nitrogen   Date Value Ref Range Status   12/31/2024 28.6 (H) 8.0 - 23.0 mg/dL Final   11/15/2022 51 (H) 7 - 30 mg/dL Final   07/08/2021 24 7 - 30 mg/dL Final     Creatinine   Date Value Ref Range Status   12/31/2024 1.34 (H) 0.67 - 1.17 mg/dL Final   07/08/2021 0.94 0.66 - 1.25 mg/dL Final     GFR Estimate   Date Value Ref Range Status   12/31/2024 58 (L) >60 mL/min/1.73m2 Final     Comment:     eGFR calculated using 2021 CKD-EPI equation.   07/08/2021 86 >60 mL/min/[1.73_m2] Final     Comment:     Non  GFR Calc  Starting 12/18/2018, serum creatinine based estimated GFR (eGFR) will be   calculated using the Chronic Kidney Disease Epidemiology Collaboration   (CKD-EPI) equation.       Calcium   Date Value Ref Range Status   12/31/2024 9.3 8.8 - 10.4 mg/dL Final     Comment:     Reference intervals for this test were updated on 7/16/2024 to reflect our healthy population more accurately. There may be differences in the flagging of prior results with similar values performed with this method. Those prior results can be interpreted in the context of the updated reference intervals.   07/08/2021 8.9 8.5 - 10.1 mg/dL Final     Phosphorus   Date Value Ref Range Status   12/20/2024 2.9 2.5 - 4.5 mg/dL Final     Albumin   Date Value Ref Range Status   12/31/2024 3.9 3.5 - 5.2 g/dL Final   11/15/2022 3.6 3.4 - 5.0 g/dL Final   07/08/2021 3.7 3.4 - 5.0 g/dL Final       Imaging:  I reviewed imaging studies.     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 However, he had worsening swelling and increased Cr to 1.39. At that time, it was concerned that he may have sirolimus toxicity. But he only had UCPR 0.39 g/g at that time. However, Sirolimus was stopped. I started him on low dose chlorthalidone and reduced dose amlodipine and his swelling improved but he developed hypotension so all blood pressure medications were discontinued.  His creatinine then came down to be stable at 1.1 with a EGFR in the 70s. He developed acute kidney injury stage I again likely secondary to vancomycin toxicity in October 2023. Cr peaked at 1.87 on 10/12/23 but later on improved and now down to 1.0-1.1. He has PNA in June 2024, his Cr were then in the range of 1.1-1.30. He has NEGRA again in Nov 2024 due to high dose bactrim, Cr up to 2.15 and another episode in Dec 2024 due to volume depletion. Now Cr is down to 1.2-1.3. UA has no blood but with  minimal proteinuria, UPCR 0.24 g/g.  I do not think that the patient is a good candidate for ACEi/ARB at this time due to recurrent infection and recent hypotensive episode.   - Hesitant to start ACEi/ARB due to recurrent infection and recent hypotensive episodesue   - Stay well hydrated, goal fluid intake 60-70 OZ daily  - Low salt diet; NA 2000 mg per day  # Hx of PJP  S/p high dose bactrim and now on bactrim PPX DS 1 tab thrice weekly  # L5/S1 discitis; recurrent; resolved  Started on Ertapenem and Vanco since August 2023, plan for 6 weeks but Vanco stopped on 10/12/23 due to NEGRA  replaced with Clindamycin but then switched to  Augmentin and doxycycline. Now completed Abx.  # Hypertension; well controlled  # Hx of hypotension with Jakafi?  Blood pressure started to increase after he developed NEGRA in October 2023.  It was as high as 180s sometimes.  After he resumed amlodipine 5 mg, BP improved. However, BP worsened after escalation of prednisone dose in Dec 2023 so Cholrthalidone was added since 1/24/24 with improvement in swelling and BP. Previously, he has mild hypoNa but after cholthalidone stopped in 6/17/24, hypoNa improves. Now completely off BP meds and  recently had episode of hypotension with Jakafi.  Blood pressure today is 124/78.   - Was on Amlodipine 5 mg daily but stopped since December 2024 due to hypotension  - Was on chlorthalidone 12.5 mg daily stopped since 6/17/24 due to hypotension with SBP 80-90s  - If he has persistent hyponatremia worsening hyponatremia, considering switching to torsemide 5 mg per day  - Continue to avoid NSAIDs use  # Mild hypercalcemia; resolved  Calcium was mildly elevated at 10.3 and 10.1 on previous visits. Ca level today 9.7. The patient is taking calcium vitamin D 1 tabs per day. Vit D is 65 on 6/13/23. PTH is 58 on 12/21/23. 10/2024: Vit D 40 and PTH 36. Ca/P normal.   # Mild hypoNa  -Improved after Cholthalidone stopped.   # Hypogammaglobulinemia  IgG 535 on 7/3/24.  -Receives intermittent IVIG infusions for IgG levels <400    The longitudinal plan of care for CKD stage 2/3, HTN, post ASCT, GVHD was addressed during this visit. Due to the added complexity in care, I will continue to support Nik Nava in the subsequent management of this condition(s) and with the ongoing continuity of care of this condition(s).     I spent  40 minutes on the date of the encounter doing chart review, history and exam, documentation and further activities as noted above. 26 minutes of this visit is dedicated to direct patient interaction via face to face.      Follow-up in 4 months with labs    N[place]wat  MD Naina on 01/07/2025

## 2025-01-07 NOTE — NURSING NOTE
Chief Complaint   Patient presents with    RECHECK     RETURN NEPHROLOGY ONCOLOGY - 6 month follow up.         /78 (BP Location: Right arm, Patient Position: Sitting, Cuff Size: Adult Large)   Pulse 74   Temp 97.6  F (36.4  C) (Oral)   Wt 118.4 kg (261 lb)   SpO2 97%   BMI 34.43 kg/m      Alec Mohan CMA on 1/7/2025 at 1:45 PM

## 2025-01-07 NOTE — NURSING NOTE
"Oncology Rooming Note    January 7, 2025 1:09 PM   Nik Nava is a 64 year old male who presents for:    Chief Complaint   Patient presents with    Port Draw     Labs drawn from port by rn.  VS taken.    Oncology Clinic Visit     Return; hx ALL s/p BMT     Initial Vitals: /78 (BP Location: Right arm, Patient Position: Sitting, Cuff Size: Adult Large)   Pulse 74   Temp 97.6  F (36.4  C) (Oral)   Resp 16   Wt 118.7 kg (261 lb 11.2 oz)   SpO2 97%   BMI 34.53 kg/m   Estimated body mass index is 34.53 kg/m  as calculated from the following:    Height as of 12/18/24: 1.854 m (6' 1\").    Weight as of this encounter: 118.7 kg (261 lb 11.2 oz). Body surface area is 2.47 meters squared.  No Pain (0) Comment: Data Unavailable   No LMP for male patient.  Allergies reviewed: Yes  Medications reviewed: Yes    Medications: Medication refills not needed today.  Pharmacy name entered into [employer]:    [facility] - [place], [place] - [address] [facility] [place], [place] - [phone]  [facility]  [facility] - [place], [place] - [address]/[id number] - [place], [place] - [address] [facility]    Frailty Screening:   Is the patient here for a new oncology consult visit in cancer care? 2. No      Clinical concerns: Cough is worse, intermittently productive (unsure of color/consistency of sputum). No fevers or SOB.  Moris Ortiz PA-C was notified.      Ghislaine Collier RN              "

## 2025-01-07 NOTE — LETTER
1/7/2025      Nik Nava  [address] [facility] [id number]      Dear Colleague,    Thank you for referring your patient, Nik Nava, to the [facility] [place]. Please see a copy of my visit note below.    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     Recently with diagnosis of PJP pneumonia. 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    HPI:  Nik is feeling tired but slightly better since starting the tamiflu course. His cough initially improved but is now worse with the cough, mostly at night. He continues to use robitussin throughout the day. He has not had any recurrent fevers and O2 sats are good. He had a CXR last week which was negative but with the worsening cough, hx of pneumonia, immunocompromised and some crackles in his left lower lung fields - we will get a CXR to rule out a recurrent pneumonia today.    He continues to be off Jakafi but has not noted a significant improvement of fatigue likely due to this current illness.     He continues to eat and drink pretty well. No nausea, vomiting or diarrhea.    ROS: Review of systems was negative except as detailed above     PHYSICAL EXAM          Blood pressure 124/78, pulse 74, temperature 97.6  F (36.4  C), temperature source Oral, resp. rate 16, weight 118.7 kg (261 lb 11.2 oz), SpO2 97%.    Wt Readings from Last 4 Encounters:   01/07/25 118.7 kg (261 lb 11.2 oz)   12/31/24 115.2 kg (254 lb)   12/24/24 115.9 kg (255 lb 9.6 oz)   12/20/24 116.7 kg (257 lb 4.8 oz)      KPS: 70    General: NAD.  HEENT: sclera anicteric. Lichenoid changes and associated erythema of b/l buccal mucosa; right buccal mucosa is a bit roughened, but otherwise no open sores.  No active ulceration. Mild OP erythema.    Lungs: crackles in left lower base with intermittent wheezing through all lung  fields  CV: RRR, HR 80s on exam     Ext/Skin: no rash      Chronic GVHD          12/24/2024    11:48 12/11/2024    09:07 10/27/2024    09:21   Chronic GVHD   Has chronic ejpgz-qgwqyx-mvdo disease developed since the last entry? No No No   Is there persistence of chronic rmjtk-tebefc-losx disease since the last entry? Yes Yes Yes   Chronic Hoppm-Lkhlxj-Lovw Disease Global Severity Score   Moderate   Total Chronic Zjghx-Wruydf-Pnsd Disease Score 2 2 3   Subjective Clinician Opinion of Severity Mild Mild Mild       LABORATORY STUDIES:       Lab Results   Component Value Date    WBC 8.1 01/07/2025    ANEU 3.5 10/26/2021    HGB 11.2 (L) 01/07/2025    HCT 32.7 (L) 01/07/2025    PLT 71 (L) 01/07/2025     12/31/2024    POTASSIUM 4.7 12/31/2024    CHLORIDE 103 12/31/2024    CO2 23 12/31/2024     (H) 12/31/2024    BUN 28.6 (H) 12/31/2024    CR 1.34 (H) 12/31/2024    MAG 1.9 12/20/2024    INR 0.92 12/18/2024     ASSESSMENT AND PLAN   Nik Nava is a 64 year old male with Ph Pos ALL, 3y s/p sib allo stem cell transplant. Admitted on 12/18/24 with persistent fevers and hypotension.     Acute lymphoblastic leukemia, Amelia chromosome positive. S/p VERÓNICA allo sib PBSCT   2Y restage consistent with MRD negative CR. BM % donor. FISH No evidence of BCR::ABL1 fusion. CG No recipient (male) cells or cells with a t(9;22) or other clonal chromosomal abnormality were detected among the 20 metaphases analyzed.    BCRABL monitoring quarterly    HEME:   Anemia of chronic disease  Thombocytopenia due to infection ; now improving despite being on jakafi, which is reassuring.  Not currently needing transfusions, but will keep hgb > 7 and plts > 10k if needed.     GVHD hx:  Skin GVHD - history of biopsy proven GVHD of the skin 8/30/2021; resolved.   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   Ocular GVHD - NIH score 1. Follows with ophthalmology, last visit 10/11/2024.  - Currently using scleral lenses and PF  "AT.    Oral GVHD - NIH score 1  - Lichenoid change of L buccal mucosa. Dexamethasone s/s prn    Current Systemic treatment for GVHD  Corticosteroids - On steroid chronically due to frequent flare of GVH with taper. Current dose prior to admission was 15mg every other day alternating with 10mg every other day.  He confirms he is back to 10/15 alternating every other day.      Previous therapies:  Tacrolimus - Experienced mild NEGRA, hyperkalemia, hypomagnesemia, and reports some tremors and cramps. Switched to sirolimus Sep 2022.  Sirolimus - Discontinued Oct 2022 due to GVH flare and significant fluid retention and proteinuria.  Belumosudil - Patient intolerant/with disease flares on tacrolimus and sirolimus as above. This was started on Oct 2022 - skin/liver/oral GVHD inactive, and occular symptoms controlled/symptomatic improvement. Discontinued Oct 2023 due to recurrent infections.  Ruxolitinib - Dec 2024: 12/13 started 5 mg twice daily, with a plan to continue in order to taper prednisone down to 5 mg daily or equivalent or below. He is felt really poorly since starting jakafi, however (cough, nausea, fatigue, \"wooziness\").  Jakafi stopped (last dose 12/26 AM)    ID:   # Immunocompromised secondary to GVHD medications  # Influenza A, Influenza A H1N1, Parainfluenza: Started 10 day tx of Tamiflu on 1/1 -- coughing worsening over the last 24-48hrs. Will repeat CXR today. Also sent in Rx for albuterol inhaler.    Recent infectious hx:  # Atypical pneumonia:  completed levaquin 750mg daily x 7 days; then resume penicillin prophylaxis 12/26.   # PJP pneumonia on BAL (11/1/2024)  - s/p high-dose bactrim per ID recommendation (21 day treatment), finished atovaquone 12/9   - now on prophy (see below)  # Rhinovirus infection 11/18/2024    # Hypogammaglobulinemia with recurrent infections: IgG 534 (12/18/24); not repleted.     Prophylaxis   - ACV Patient developed oral herpes reactivation after stopping acyclovir in Jan " 2023  - Penicillin 250 mg BID due to atrophic spleen   - PO bactrim 3x/week (hx of CKD)    Vaccinations  Completed 1Y and 2Y vaccination series.  Received his influenza/COVID vaccine 9/24/2024 and RSV vaccine 8/13.   Complete pneumococcal and HiB vaccine. Recommend meningococcal vaccine once infection resolved.    Renal  # History of RCC s/p R nephrectomy in 2007.  Has neph-onc follow up 1/7/2025.    # CKD:  patient/wife state this is medication related and started since his transplant.  Creatinine had been around 1.3, but thalia to a new baseline of 1.8-2 with addition of high dose Bactrim.    Now improved off high dose steroids.     Endo:   # Hx of graves disease; On synthroid follows with endocrine    CV:   # Hypertension; On amlodipine (on hold with hypotension).  # HLD; On rosuvastatin    Psych:   # Situational anxiety; Lexapro 10mg daily.  Ativan 1mg po q 6 hours prn anxiety.     Today's Summary:  CXR today - APP4 will follow up on results for need for possible abx  Albuterol inhaler sent to local pharmacy  Complete Tamiflu course    RTC (1/17) Dr Kera KEEN spent 35 minutes in the care of this patient today, which included time necessary for preparation for the visit, obtaining history, ordering medications/tests/procedures as medically indicated, review of pertinent medical literature, counseling of the patient, communication of recommendations to the care team, and documentation time.    Moris Ortiz PA-C       Again, thank you for allowing me to participate in the care of your patient.        Sincerely,        BMT Advanced Practice Provider    Electronically signed

## 2025-01-07 NOTE — LETTER
1/7/2025       RE: Nik Nava  [address] [facility] [address]     Dear Colleague,    Thank you for referring your patient, Nik Nava, to the [facility] [place] at [facility]. Please see a copy of my visit note below.      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      Oncologic history: The patient was diagnosed with Ph+ ALL in 2020 after presented with feeling unwell and found to have leukocytosis and blast in his blood. He was treated with Dex and Dasatinib then 2 cycles of vincristine, prednisone, daunorubicin, and pegasparaginase with intrathecal methotrexate. Last ly, he received 1 course of high dose Jaymie-C. He achieved CR with no MRD. Subsequently, he underwent allo sib NMA (flu/cy+TBI) PBSCT on  8/10/21. hematocrit-CI was 3.  The patient was noted to be in CR with BmBx at D100, D180 and 1Y showing 100% donor. He has not been started on TKI due to previous multiple active issues. Post Tx complication included cGVHD at skin, eyes, o liver (all Bx proven, except eyes, clinically) started since 2/22. The current active cGVHDD involved eyes and skin. He was on Tacrolimus for cGVHD but later discontinued due to NEGRA, hyperkalemia hypomagnesemia, tremors and cramps in 9/22. Sirolimus was started in 9/21/22 in replacement of Tac. Cr improved, but the presented on 10/11/22 with ocular and cGVHD flare, fluid retention and gain 5-6 kg. BMT thought this is sirolimus Tox? (of note UPCR was only 0.4 and normal SAlb). Sirolimus was then stopped. Prednisone was started at 40 mg with slow tapering. He was also started on Belumosudil on 10/18/22 (ROCK2  inhibitors:Rho-associated coiled-coil kinase )  Other pertinent Hx:   - He had PE in the setting of receiving PEG asparaginase and was treated with Xarelto which is now completed.   - He had hyperferritinemia which now being followed closely. Not on iron chelators.  - He had COVID -19 in 5/22.  - CMV viremia  - Grave disease  - HTN  Kidney history: He had RCC s/p Rt nephrectomy in 2007.  The patient has baseline creatinine of 0.9-1 pretransplant.  He developed NEGRA in 9/21 with Cr peaked at 1.89 but then come down to baseline. He had NEGRA again in 9/22 with Cr peaked at 1.8, suspect that due to tacrolimus toxicity and it was stopped. Cr improved but now still fluctuates between 1.1-1.3.  The most recent creatinine was on 10/31/2022 at 1.08.  Serum albumin is 3.3. UCPR is 0.39 g/g on 10/18/22. UA on 9/12/21 and 10/18/22 showed no protein, no blood.  However UA on 9/12/2021 showed 8 hyaline cast.      11/1/22: He is doing well except that he still has LE edema. It has improved from when he was on Sirolimus but still quite a bit. Edema has actually started since 2021 but unclear when.But it is certainly getting worse lately when he was treated with high dose steroid and sirolimus.His cGVHD is o/eva all stable but not completely gone. He was just started Belumosudil on 10/18/22 for cGVHD. He just saw BMT today and they are decreasing his steroid.Amlodipine started since 8/21 and possibly related to his swelling. He has multiple SEs from steroid such as weight gain , central obesity, easily bruised, increased appetite and partial insomnia. He has some nocturia. He has no dysuria or hematuria. Recently, he had cat scrath incidence and being treated with doxycycline. He has abdominal distension and gained weight progressively. He drinks 120 Oz of tea per day. We started chlorthalidone and reduced amlodipine.   12/1-12/3/22: Was admitted for dyspnea and malaise concerning for belumosudil intolerance. However, at the same time  he also had flu A positive. He was taken off of amlodipine on 12/6/22. And subsequently chlorthalidone was off in 12/13/22 when Na was 128. Na improved afterwards.   1/10/23:  He feels good today. His BP are now very good 110-130/70 mmHg. He is not on any BP medication ayt this time. He is taking Belumosidil. Symptoms of cGVHD have been improving.  He has some LE edema and easily bruised likely from steroid/belumosudil.   Labs: Creatinine 1.11, potassium 4.8,, dioxide 28, phosphorus 2.3, albumin 4.2, calcium 10.1.  Hemoglobin 11.3, platelet 140. UA pending.   6/13/23: In the interim, he went to the ED on 5/14/23 for testicular pain. CT showed no hernia or mass. Hew was also diagnosed with spine osteomyelitis and Epidural abscess and being treated with Ceftraixone for 6 weeks. Noted stable cGVHD. He also had COVID in 1/23. Now on physiologica dose of steroid. Still has dry eyes and a bit of mouth sore.  He drinks about 3-4 glasses of milk per day. He still has back pain that radiating downt to right leg. He completed ceftriaxone in 5/25/23. Appetite is stable as well as his weight. He has minimal swelling. He is on prednisone 4 mg alternate with 10 mg per day. Labs 6/13/23: Cr 1.10, BUN 46.2, K 5.1, Calcium 10.3, Phos 3.1, Albumin 4.0. Hb 11.7, Platelet 182, WBC 10.1.   10/16/23: In interim, he was diagnosed with L5-S1 discitis osteomyelitis at [facility] after presented with ongoing back pain.  MRI showing findings consistent with ongoing discitis-osteomyelitis at L5-S1 which has progressed since last imaging in June. He was seen by ID and neurosurgery. He underwent disc aspiration.  ID is recommending another 6 weeks course of antibiotics with a different regimen: Invanz and vancomycin. Neurosurgery did not recommend any surgical intervention at this time. Vanco dose was reduced on 10/10/23 due to kidney function and stopped last Thursday when Cr was 1.87. Lately, his blood pressure has been increasing to  150-160 and sometimes 180. I asked him to resume amlodipine 5 mg.  After stopping vancomycin, BP has improved along with his appetite. Cr has improved  to 1.68 on 10/16/23 ([facility] labs). He has gained some weight. His swelling was worse when he was admitted in the hospital. Now he is on Ertapenenm and clindamycin oral until 10/23. Pain is mostly when he lays too long and going to get up. He is still using fentanyl patch but the dose was reduced. Belumosidil was just on hold.  Labs on 10/10/2023 showed creatinine 1.56 but Cr 1.678 on 10/16/23 ([facility] lab), BUN 26, GFR 49, potassium 3.5,, dioxide 28, calcium 10.2, albumin 4.1, alkaline phosphatase 141.  CBC show white blood cell 13, hemoglobin 10.9, platelet 213. Vancomycin at [facility] was 21.4 on 10/9/23. CRP 2.2 and ESR 46. UA on 10/12/23 showed no blood but UPCR is 0.2 g/g.   11/14/23: He has been on Augmentin and Doxycyline plan until end of this month . Clindamycin upsets his currently due to main complaint is low energy and restless leg syndrome that started about 2 weeks ago.  He recently stopped taking magnesium and iron a month ago.  Stomach. He has minor swelling. He is off of fentanyl 2 weeks ago. BP has been in the 120-130/80s. His Cr has improved and from [facility] lab, Cr 1.26 on 11/6/23. He has been eating and drinking well. He is on prednisone. He started to have restless leg.  Labs today show WBC 11.5, Hb 11.6.  Creatinine 1.4, EGFR 56, potassium 4.8, bicarb 24.  calcium 10.1 albumin 4.4.  CRP less than 3, sed rate 27.  UA showed protein 50 mg/dL.  Platelets of 2, RBC less than 1.UPCR pending.   2/20/24: He has relapsed of oral GVHD and prednisone was increased to 40 mg/d and now on tapering dose. He is also taking valtrex which has helped with HSV. In the meantime, his BP were in the 140-150 so chlorthalidone 12.5 mg was added at the end of January 2024. He also was admitted at the end of January for fever, chest pain but all work-up were unrevealing. He  was treated him with Doxycycline for 14 days and now he feels better. Labs today showed Na 133, K 4.6, Bicarb 25, Cr 1.06 and BUN 40.6. CBC showed WBC 11.7, Hb 12.6. UA is bland. UPCR 0.27 g/g.   7/9/24: In the interim, he stopped chlorthalidone on 6/17/24 due to low BP and no swelling. He was hospitalized for PNA between 5/28-6/1 and was initially given IV fluids and IV antibiotics but then transitioned to complete a 10-day course of cefdinir. Later on he has right leg pain, he presented to a local ER on June 11.  Venous Doppler US showed no DVT.  He briefly took Tylenol and ibuprofen for pain control.  Today, he has no fever but a bit of leg swelling. He wears compression stocking. He is on prednisone 10 milligram alternate 15 mg for GVHD.  Patient denies of any unexpected weight gain or weight loss, chest pain, shortness of breath, nausea, vomiting, change in urine output.  Labs today shows Cr 1.32, , K4.2, WBC 11.5, hemoglobin 11.8, UA spec gravity 1.031, urine protein albumin 70mg/dL, protein/creat urine 0.16.    1/7/2025: In the interim, the patient was admitted for PJP pneumonia and was treated with Bactrim IV for 3 weeks and now on oral Bactrim 3 times per weeks. Later on, he developed parainfluenza URI and admitted again in mid December 2024.  He was started on Jakafi on 12/13/24.  He had volume depletion with shock, BP in the 60s. He was also diagnosed with influenza A on 12/31/24 and was started on Tamiflu. Jakafi stopped on 12/27/24 due to hypotension. He is on 15 mg alternating with 10 mg daily. Today, still has some cough and weak. No fever or SOB. No problem with swelling. He has no proble with urination.   Labs today showed Cr 1.23, eGFR 65, K 4.4, CO2 23, Ca 9.6, Phos 2.9, albumin 4.0.  Hemoglobin 11.2, platelets 71.  UA showed no blood. UPCR 0.24 g/g.     Past medical history  Past Medical History:   Diagnosis Date     ALL (acute lymphocytic leukemia) (H)      CKD (chronic kidney disease)       GVHD (graft versus host disease) (H)      H/O peripheral stem cell transplant (H)      Hyperthyroidism 1996     Infection due to 2019 novel coronavirus 01/16/2023     Infection due to 2019 novel coronavirus 05/18/2022     Influenza A 11/2022     Postablative hypothyroidism 1997     Pulmonary embolism (H)      Renal cell carcinoma (H) 2007    right kidney     Sleep apnea        Past surgical history  Past Surgical History:   Procedure Laterality Date     APPENDECTOMY       BACK SURGERY  2017     BONE MARROW BIOPSY, BONE SPECIMEN, NEEDLE/TROCAR Left 09/02/2021    Procedure: BIOPSY, BONE MARROW;  Surgeon: Jailyn Ny APRN CNP;  Location: [facility] OR     BONE MARROW BIOPSY, BONE SPECIMEN, NEEDLE/TROCAR Left 11/15/2021    Procedure: BIOPSY, BONE MARROW;  Surgeon: Socrates De La Torre;  Location: [facility] OR     BONE MARROW BIOPSY, BONE SPECIMEN, NEEDLE/TROCAR Left 02/07/2022    Procedure: BIOPSY, BONE MARROW;  Surgeon: Renetta Wiggins PA-C;  Location: [facility] OR     BONE MARROW BIOPSY, BONE SPECIMEN, NEEDLE/TROCAR Left 08/18/2022    Procedure: BIOPSY, BONE MARROW;  Surgeon: Lorrie Yap PA-C;  Location: [facility] OR     BONE MARROW BIOPSY, BONE SPECIMEN, NEEDLE/TROCAR Left 08/07/2023    Procedure: Bone marrow biopsy, bone specimen, needle/trocar;  Surgeon: Teressa Rick PA-C;  Location: [facility] OR     BRONCHOSCOPY (RIGID OR FLEXIBLE), DIAGNOSTIC N/A 04/29/2022    Procedure: BRONCHOSCOPY, WITH BRONCHOALVEOLAR LAVAGE;  Surgeon: Murtaza Garcia MD;  [facility]     BRONCHOSCOPY (RIGID OR FLEXIBLE), DIAGNOSTIC N/A 11/1/2024    Procedure: BRONCHOSCOPY, WITH BRONCHOALVEOLAR LAVAGE;  Surgeon: Murtaza Garcia MD;  Location: [facility]     IR CVC TUNNEL PLACEMENT > 5 YRS OF AGE  08/04/2021     IR CVC TUNNEL REMOVAL LEFT  09/02/2021     IR LIVER BIOPSY PERCUTANEOUS  02/21/2022     IR LUMBAR PUNCTURE  1/21/2021     NEPHRECTOMY  2007     ORTHOPEDIC SURGERY      bilateral shoulder surgeries     PERCUTANEOUS BIOPSY  LIVER N/A 02/21/2022    Procedure: NEEDLE BIOPSY, LIVER, PERCUTANEOUS;  Surgeon: Trace Castellanos MD;  Location: [facility] OR     PHACOEMULSIFICATION WITH STANDARD INTRAOCULAR LENS IMPLANT Left 1/15/2024    Procedure: LEFT EYE PHACOEMULSIFICATION, CATARACT, WITH STANDARD INTRAOCULAR LENS IMPLANT INSERTION;  Surgeon: Deshawn Menezes MD;  Location: [facility] OR     PHACOEMULSIFICATION WITH STANDARD INTRAOCULAR LENS IMPLANT Right 2/5/2024    Procedure: RIGHT EYE PHACOEMULSIFICATION, CATARACT, WITH STANDARD INTRAOCULAR LENS IMPLANT INSERTION;  Surgeon: Deshawn Menezes MD;  Location: [facility] OR       Review of Systems:   14 systems were reviewed and all negative except as mentioned above.   Current Medications:  Current Outpatient Medications   Medication Sig Dispense Refill     acyclovir (ZOVIRAX) 400 MG tablet Take 800 mg by mouth every 12 hours. Taking 800 mg BID       dexAMETHasone alcohol-free (DECADRON) 0.1 MG/ML solution Swish and spit 10 mLs (1 mg) in mouth 2 times daily. 500 mL 2     escitalopram (LEXAPRO) 10 MG tablet Take 1 tablet (10 mg) by mouth daily 30 tablet 3     levothyroxine (SYNTHROID/LEVOTHROID) 112 MCG tablet Take 1 tablet (112 mcg) by mouth daily. 90 tablet 4     LORazepam (ATIVAN) 1 MG tablet Take 1 mg by mouth every 6 hours as needed for anxiety. PRN       metroNIDAZOLE (METROCREAM) 0.75 % external cream Apply topically 2 times daily Apply to the nose. 45 g 3     nicotine polacrilex (NICORETTE) 4 MG gum Place 4 mg inside cheek daily.       Nutritional Supplements (ENSURE COMPLETE SHAKE) LIQD Take 11 mLs by mouth every morning       omeprazole (PRILOSEC) 40 MG DR capsule Take 1 capsule (40 mg) by mouth daily 30 capsule 3     oseltamivir (TAMIFLU) 75 MG capsule Take 1 capsule (75 mg) by mouth 2 times daily for 10 days. 20 capsule 0     penicillin V (VEETID) 250 MG tablet Take 1 tablet (250 mg) by mouth 2 times daily.       polyethylene glycol (MIRALAX) 17 GM/Dose powder Take by mouth.        predniSONE (DELTASONE) 10 MG tablet Take 1 tablet (10 mg) by mouth daily. Alternates 10 and 15 mg every other day       predniSONE (DELTASONE) 5 MG tablet Take 1 tablet (5 mg) by mouth daily. Use combined with 10mg strength tab for a dose of 15mg alternating with 10mg every other day.       rosuvastatin (CRESTOR) 5 MG tablet Take 1 tablet (5 mg) by mouth daily. 30 tablet 3     sennosides (SENOKOT) 8.6 MG tablet Take 1 tablet by mouth 3 times daily as needed for constipation 90 tablet 0     sulfamethoxazole-trimethoprim (BACTRIM DS) 800-160 MG tablet Take 1 tablet by mouth Every Mon, Wed, Fri Morning.       tacrolimus (PROTOPIC) 0.1 % external ointment Apply topically 2 times daily 30 g 1     No current facility-administered medications for this visit.       Physical Exam:   There were no vitals taken for this visit.   There is no height or weight on file to calculate BMI.  There were no vitals taken for this visit.     GENERAL APPEARANCE: Alert, not in acute distress; pleasant. + Cushingoid appearance.   EYES:  Not pale conjunctiva, pupils equal  HENT: Mouth without ulcers or lesions  PULM: lungs clear to auscultation bilaterally, equal air movement, no clubbing  CV: regular rhythm, normal rate, no rub     -JVD no distended.      -edema: No edema BLE.   GI: soft,  - tender, no distended, bowel sounds are present  INTEGUMENT: No rashes.   NEURO:  Non focal. No asterixis.     Labs:   All labs reviewed by me  Last Renal Panel:  Sodium   Date Value Ref Range Status   12/31/2024 136 135 - 145 mmol/L Final   07/08/2021 139 133 - 144 mmol/L Final     Potassium   Date Value Ref Range Status   12/31/2024 4.7 3.4 - 5.3 mmol/L Final   11/15/2022 5.0 3.4 - 5.3 mmol/L Final   07/08/2021 3.9 3.4 - 5.3 mmol/L Final     Potassium POCT   Date Value Ref Range Status   12/01/2022 4.8 3.4 - 5.3 mmol/L Final     Chloride   Date Value Ref Range Status   12/31/2024 103 98 - 107 mmol/L Final   11/15/2022 101 94 - 109 mmol/L Final    07/08/2021 108 94 - 109 mmol/L Final     Carbon Dioxide   Date Value Ref Range Status   07/08/2021 23 20 - 32 mmol/L Final     Carbon Dioxide (CO2)   Date Value Ref Range Status   12/31/2024 23 22 - 29 mmol/L Final   11/15/2022 30 20 - 32 mmol/L Final     Anion Gap   Date Value Ref Range Status   12/31/2024 10 7 - 15 mmol/L Final   11/15/2022 3 3 - 14 mmol/L Final   07/08/2021 7 3 - 14 mmol/L Final     Glucose   Date Value Ref Range Status   12/31/2024 104 (H) 70 - 99 mg/dL Final   11/15/2022 113 (H) 70 - 99 mg/dL Final   07/08/2021 107 (H) 70 - 99 mg/dL Final     GLUCOSE BY METER POCT   Date Value Ref Range Status   11/27/2024 124 (H) 70 - 99 mg/dL Final     Urea Nitrogen   Date Value Ref Range Status   12/31/2024 28.6 (H) 8.0 - 23.0 mg/dL Final   11/15/2022 51 (H) 7 - 30 mg/dL Final   07/08/2021 24 7 - 30 mg/dL Final     Creatinine   Date Value Ref Range Status   12/31/2024 1.34 (H) 0.67 - 1.17 mg/dL Final   07/08/2021 0.94 0.66 - 1.25 mg/dL Final     GFR Estimate   Date Value Ref Range Status   12/31/2024 58 (L) >60 mL/min/1.73m2 Final     Comment:     eGFR calculated using 2021 CKD-EPI equation.   07/08/2021 86 >60 mL/min/[1.73_m2] Final     Comment:     Non  GFR Calc  Starting 12/18/2018, serum creatinine based estimated GFR (eGFR) will be   calculated using the Chronic Kidney Disease Epidemiology Collaboration   (CKD-EPI) equation.       Calcium   Date Value Ref Range Status   12/31/2024 9.3 8.8 - 10.4 mg/dL Final     Comment:     Reference intervals for this test were updated on 7/16/2024 to reflect our healthy population more accurately. There may be differences in the flagging of prior results with similar values performed with this method. Those prior results can be interpreted in the context of the updated reference intervals.   07/08/2021 8.9 8.5 - 10.1 mg/dL Final     Phosphorus   Date Value Ref Range Status   12/20/2024 2.9 2.5 - 4.5 mg/dL Final     Albumin   Date Value Ref Range  Status   12/31/2024 3.9 3.5 - 5.2 g/dL Final   11/15/2022 3.6 3.4 - 5.0 g/dL Final   07/08/2021 3.7 3.4 - 5.0 g/dL Final       Imaging:  I reviewed imaging studies.     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 However, he had worsening swelling and increased Cr to 1.39. At that time, it was concerned that he may have sirolimus toxicity. But he only had UCPR 0.39 g/g at that time. However, Sirolimus was stopped. I started him on low dose chlorthalidone and reduced dose amlodipine and his swelling improved but he developed hypotension so all blood pressure medications were discontinued.  His creatinine then came down to be stable at 1.1 with a EGFR in the 70s. He developed acute kidney injury stage I again likely secondary to vancomycin toxicity in October 2023. Cr peaked at 1.87 on 10/12/23 but later on improved and now down to 1.0-1.1. He has PNA in June 2024, his Cr were then in the range of 1.1-1.30. He has NEGRA again in Nov 2024 due to high dose bactrim, Cr up to 2.15 and another episode in Dec 2024 due to volume depletion. Now Cr is down to 1.2-1.3. UA has no blood but with  minimal proteinuria, UPCR 0.24 g/g.  I do not think that the patient is a good candidate for ACEi/ARB at this time due to recurrent infection and recent hypotensive episode.   - Hesitant to start ACEi/ARB due to recurrent infection and recent hypotensive episodesue   - Stay  well hydrated, goal fluid intake 60-70 OZ daily  - Low salt diet; NA 2000 mg per day  # Hx of PJP  S/p high dose bactrim and now on bactrim PPX DS 1 tab thrice weekly  # L5/S1 discitis; recurrent; resolved  Started on Ertapenem and Vanco since August 2023, plan for 6 weeks but Vanco stopped on 10/12/23 due to NEGRA  replaced with Clindamycin but then switched to Augmentin and doxycycline. Now completed Abx.  # Hypertension; well controlled  # Hx of hypotension with Jakafi?  Blood pressure started to increase after he developed NEGRA in October 2023.  It was as high as 180s sometimes.  After he resumed amlodipine 5 mg, BP improved. However, BP worsened after escalation of prednisone dose in Dec 2023 so Cholrthalidone was added since 1/24/24 with improvement in swelling and BP. Previously, he has mild hypoNa but after cholthalidone stopped in 6/17/24, hypoNa improves. Now completely off BP meds and  recently had episode of hypotension with Jakafi.  Blood pressure today is 124/78.   - Was on Amlodipine 5 mg daily but stopped since December 2024 due to hypotension  - Was on chlorthalidone 12.5 mg daily stopped since 6/17/24 due to hypotension with SBP 80-90s  - If he has persistent hyponatremia worsening hyponatremia, considering switching to torsemide 5 mg per day  - Continue to avoid NSAIDs use  # Mild hypercalcemia; resolved  Calcium was mildly elevated at 10.3 and 10.1 on previous visits. Ca level today 9.7. The patient is taking calcium vitamin D 1 tabs per day. Vit D is 65 on 6/13/23. PTH is 58 on 12/21/23. 10/2024: Vit D 40 and PTH 36. Ca/P normal.   # Mild hypoNa  -Improved after Cholthalidone stopped.   # Hypogammaglobulinemia  IgG 535 on 7/3/24.  -Receives intermittent IVIG infusions for IgG levels <400    The longitudinal plan of care for CKD stage 2/3, HTN, post ASCT, GVHD was addressed during this visit. Due to the added complexity in care, I will continue to support Nik Nava in the subsequent management  of this condition(s) and with the ongoing continuity of care of this condition(s).     I spent  40 minutes on the date of the encounter doing chart review, history and exam, documentation and further activities as noted above. 26 minutes of this visit is dedicated to direct patient interaction via face to face.      Follow-up in 4 months with labs    Keegan Chew MD on 01/07/2025            Again, thank you for allowing me to participate in the care of your patient.      Sincerely,    Keegan Chew MD

## 2025-01-08 ENCOUNTER — DOCUMENTATION ONLY (OUTPATIENT)
Dept: PHARMACY | Facility: CLINIC | Age: 65
End: 2025-01-08
Payer: MEDICARE

## 2025-01-08 LAB — IGG SERPL-MCNC: 499 MG/DL (ref 610–1616)

## 2025-01-08 NOTE — PROGRESS NOTES
Pharmacist IVIG Stewardship Program    Diagnosis: Hypogammaglobulinemia   Date  4/12/24   IgG Level (mg/dL) 352     Current Dosing Regimen: Privigen 40g IV every 28 days PRN for IgG <400 mg/dL  Patient is dosed as needed based on an IgG levels of less than 400 mg/dL to ensure the lowest amount of medication is administered to achieve beneficial outcomes.  Pre-medications:   Acetaminophen 650 mg by mouth, 30 minutes prior to infusion  Diphenhydramine 50 mg by mouth, 30 minutes prior to infusion  Hydrocortisone 100 mg IV, 30 minutes prior to infusion  Current Titration Regimen: Start infusion at 0.5 ml/kg/hr x 15 min. If tolerated, increase rate by 0.5 ml/kg/hr every 15 min to a maximum of 4 ml/kg/hr.  Previously Tried Products: None     Side Effects: Unable to reach patient to discuss.     Interventions: Lab review completed. .     Assessment:   Date  1/7/2025   IgG Level (mg/dL) 499   Patient is appropriate for additional IVIG therapy when their level is within parameter. IVIG infusions are effective in increasing IgG levels to an appropriate level to minimize patient's risk of infection. Patient should continue on treatment as they have been per provider order. Without therapy their levels would put them at an increased risk of infections.    Plan: Patient is not okay to proceed with infusions at this time as their level is above their provider ordered parameter. They will need to have labs drawn again next month to assess the need of ongoing infusions moving forward.     Next Review Needed: 2/2025    Genevieve Ramirez, PharmD, IgCP  Medication Access Pharmacist

## 2025-01-10 ENCOUNTER — ANCILLARY PROCEDURE (OUTPATIENT)
Dept: BONE DENSITY | Facility: CLINIC | Age: 65
End: 2025-01-10
Payer: MEDICARE

## 2025-01-10 DIAGNOSIS — Z94.81 STATUS POST BONE MARROW TRANSPLANT (H): ICD-10-CM

## 2025-01-10 DIAGNOSIS — E89.0 POSTABLATIVE HYPOTHYROIDISM: ICD-10-CM

## 2025-01-10 DIAGNOSIS — Z79.52 ON CORTICOSTEROID THERAPY: ICD-10-CM

## 2025-01-10 DIAGNOSIS — Z86.39 HISTORY OF GRAVES' DISEASE: ICD-10-CM

## 2025-01-10 PROCEDURE — 77080 DXA BONE DENSITY AXIAL: CPT

## 2025-01-14 ENCOUNTER — TELEPHONE (OUTPATIENT)
Dept: TRANSPLANT | Facility: CLINIC | Age: 65
End: 2025-01-14
Payer: MEDICARE

## 2025-01-14 DIAGNOSIS — Z71.9 VISIT FOR COUNSELING: Primary | ICD-10-CM

## 2025-01-14 NOTE — TELEPHONE ENCOUNTER
BMT CSW Telephone Encounter  Clinical Social Work  [facility]      Focus: Other: Medicare Denial letter    Data: Pt is a 64 year old  old male    Interventions: Clinical  (CSW) spoke w/ Pt via phone on January 14, 2025 to assist with a medicare denial letter the pt had received from [facility].  Pt shared he will be at the clinic on Friday and SW will meet him in person to make a copy of the letter.  CSW assessed coping, provide supportive counseling and assist with resources as needed. SW encouraged Pt to contact CSW for support, questions and/or resources.    Assessment: Pt appears to be coping approachable at this time. Pt continues to be supported by Spouse/Partner.     Plan: CSW will continue to work with Pt and family to provide supportive counseling and assist with resources as needed. CSW will continue to collaborate with multidisciplinary team regarding Pt's plan of care.     MATEO Clark, [facility]  Adult Blood & Marrow Transplant    Phone: [phone]  XIOM[place] Searchable at BMT SW 4

## 2025-01-17 ENCOUNTER — APPOINTMENT (OUTPATIENT)
Dept: LAB | Facility: CLINIC | Age: 65
End: 2025-01-17
Attending: INTERNAL MEDICINE
Payer: MEDICARE

## 2025-01-21 ENCOUNTER — TELEPHONE (OUTPATIENT)
Dept: TRANSPLANT | Facility: CLINIC | Age: 65
End: 2025-01-21
Payer: MEDICARE

## 2025-01-21 DIAGNOSIS — Z71.9 VISIT FOR COUNSELING: Primary | ICD-10-CM

## 2025-01-21 NOTE — TELEPHONE ENCOUNTER
BMT CSW Telephone Encounter  Clinical Social Work  [facility]      Focus: Resources    Data: Pt is a 64 year old  old male    Interventions: Clinical  (CSW) spoke w/ Pt via phone on January 21, 2025 to assist with a letter of denial from [employer]. SW had not received the letter last week and wanted to follow up, pt was able to resend it and SW will work on getting any documentation needed for the letter. CSW assessed coping, provide supportive counseling and assist with resources as needed. SW encouraged Pt to contact CSW for support, questions and/or resources.    Assessment: Pt appears to be coping well at this time. Pt continues to be supported by Spouse/Partner.     Plan: CSW will continue to work with Pt and family to provide supportive counseling and assist with resources as needed. CSW will continue to collaborate with multidisciplinary team regarding Pt's plan of care.     MATEO Clark, [facility]  Adult Blood & Marrow Transplant    Phone: [phone]  [place]OM[place] Searchable at BMT SW 4

## 2025-02-06 ENCOUNTER — LAB (OUTPATIENT)
Dept: LAB | Facility: CLINIC | Age: 65
End: 2025-02-06
Attending: INTERNAL MEDICINE
Payer: MEDICARE

## 2025-02-06 ENCOUNTER — TELEPHONE (OUTPATIENT)
Dept: INFECTIOUS DISEASES | Facility: CLINIC | Age: 65
End: 2025-02-06
Payer: MEDICARE

## 2025-02-06 ENCOUNTER — APPOINTMENT (OUTPATIENT)
Dept: TRANSPLANT | Facility: CLINIC | Age: 65
End: 2025-02-06
Payer: MEDICARE

## 2025-02-06 DIAGNOSIS — Z23 NEED FOR VACCINATION: Primary | ICD-10-CM

## 2025-02-06 PROCEDURE — 250N000011 HC RX IP 250 OP 636: Performed by: INTERNAL MEDICINE

## 2025-02-06 RX ORDER — HEPARIN SODIUM (PORCINE) LOCK FLUSH IV SOLN 100 UNIT/ML 100 UNIT/ML
5 SOLUTION INTRAVENOUS ONCE
Status: COMPLETED | OUTPATIENT
Start: 2025-02-06 | End: 2025-02-06

## 2025-02-06 RX ADMIN — HEPARIN 5 ML: 100 SYRINGE at 10:22

## 2025-02-06 NOTE — NURSING NOTE
Chief Complaint   Patient presents with    Blood Draw     Port flush with heparin by lab RN       Port accessed with blood draw and heparin flush by lab RN.     Natty Ceballos RN

## 2025-02-10 ENCOUNTER — TELEPHONE (OUTPATIENT)
Dept: TRANSPLANT | Facility: CLINIC | Age: 65
End: 2025-02-10
Payer: MEDICARE

## 2025-02-11 ENCOUNTER — ALLIED HEALTH/NURSE VISIT (OUTPATIENT)
Dept: TRANSPLANT | Facility: CLINIC | Age: 65
End: 2025-02-11
Payer: MEDICARE

## 2025-02-11 DIAGNOSIS — C91.01 ACUTE LYMPHOBLASTIC LEUKEMIA (ALL) IN REMISSION (H): ICD-10-CM

## 2025-02-11 DIAGNOSIS — Z94.81 STATUS POST BONE MARROW TRANSPLANT (H): Primary | ICD-10-CM

## 2025-02-11 PROCEDURE — 90471 IMMUNIZATION ADMIN: CPT | Performed by: INTERNAL MEDICINE

## 2025-02-11 PROCEDURE — 250N000021 HC RX MED A9270 GY (STAT IND- M) 250: Performed by: INTERNAL MEDICINE

## 2025-02-11 PROCEDURE — 90620 MENB-4C VACCINE IM: CPT | Performed by: INTERNAL MEDICINE

## 2025-02-11 RX ORDER — PENICILLIN V POTASSIUM 250 MG/1
250 TABLET, FILM COATED ORAL 2 TIMES DAILY
Qty: 60 TABLET | Refills: 2 | Status: SHIPPED | OUTPATIENT
Start: 2025-02-11

## 2025-02-11 RX ADMIN — NEISSERIA MENINGITIDIS SEROGROUP B NHBA FUSION PROTEIN ANTIGEN, NEISSERIA MENINGITIDIS SEROGROUP B FHBP FUSION PROTEIN ANTIGEN AND NEISSERIA MENINGITIDIS SEROGROUP B NADA PROTEIN ANTIGEN 0.5 ML: 50; 50; 50; 25 INJECTION, SUSPENSION INTRAMUSCULAR at 15:07

## 2025-02-11 NOTE — PROGRESS NOTES
Chief Complaint   Patient presents with    Allied Health Visit     Men B     Administrations This Visit       meningococcal vaccine B (Recomb) (BEXSERO) injection 0.5 mL       Admin Date  02/11/2025 Action  $Given Dose  0.5 mL Route  Intramuscular Documented By  Jenise Morales, TRES Morales, CMA

## 2025-02-24 ENCOUNTER — TELEPHONE (OUTPATIENT)
Dept: DERMATOLOGY | Facility: CLINIC | Age: 65
End: 2025-02-24
Payer: MEDICARE

## 2025-02-24 NOTE — TELEPHONE ENCOUNTER
2/24 Left Voicemail (1st Attempt) and Sent Mychart (1st Attempt) for the patient to call back and schedule the following:    Appointment type: Return Dermatology  Provider: Xuan  Return date: 5/12/2025  Specialty phone number: [phone]  Additional appointment(s) needed: n/a  Additonal Notes: n/a

## 2025-02-27 ASSESSMENT — SLEEP AND FATIGUE QUESTIONNAIRES
HOW LIKELY ARE YOU TO NOD OFF OR FALL ASLEEP WHILE SITTING AND TALKING TO SOMEONE: WOULD NEVER DOZE
HOW LIKELY ARE YOU TO NOD OFF OR FALL ASLEEP WHILE SITTING AND READING: MODERATE CHANCE OF DOZING
HOW LIKELY ARE YOU TO NOD OFF OR FALL ASLEEP WHILE WATCHING TV: SLIGHT CHANCE OF DOZING
HOW LIKELY ARE YOU TO NOD OFF OR FALL ASLEEP WHILE SITTING INACTIVE IN A PUBLIC PLACE: WOULD NEVER DOZE
HOW LIKELY ARE YOU TO NOD OFF OR FALL ASLEEP WHEN YOU ARE A PASSENGER IN A CAR FOR AN HOUR WITHOUT A BREAK: SLIGHT CHANCE OF DOZING
HOW LIKELY ARE YOU TO NOD OFF OR FALL ASLEEP WHILE LYING DOWN TO REST IN THE AFTERNOON WHEN CIRCUMSTANCES PERMIT: HIGH CHANCE OF DOZING
HOW LIKELY ARE YOU TO NOD OFF OR FALL ASLEEP IN A CAR, WHILE STOPPED FOR A FEW MINUTES IN TRAFFIC: WOULD NEVER DOZE
HOW LIKELY ARE YOU TO NOD OFF OR FALL ASLEEP WHILE SITTING QUIETLY AFTER LUNCH WITHOUT ALCOHOL: SLIGHT CHANCE OF DOZING

## 2025-03-04 ENCOUNTER — OFFICE VISIT (OUTPATIENT)
Dept: SLEEP MEDICINE | Facility: CLINIC | Age: 65
End: 2025-03-04
Attending: NURSE PRACTITIONER
Payer: MEDICARE

## 2025-03-04 DIAGNOSIS — G47.33 OSA (OBSTRUCTIVE SLEEP APNEA): ICD-10-CM

## 2025-03-04 DIAGNOSIS — G47.00 INSOMNIA, UNSPECIFIED TYPE: ICD-10-CM

## 2025-03-04 DIAGNOSIS — D89.813 GVHD AS COMPLICATION OF BONE MARROW TRANSPLANT (H): ICD-10-CM

## 2025-03-04 DIAGNOSIS — R06.83 LOUD SNORING: ICD-10-CM

## 2025-03-04 DIAGNOSIS — E66.811 OBESITY (BMI 30.0-34.9): ICD-10-CM

## 2025-03-04 DIAGNOSIS — Z86.69 HISTORY OF SLEEP APNEA: ICD-10-CM

## 2025-03-04 DIAGNOSIS — R40.0 DAYTIME SOMNOLENCE: ICD-10-CM

## 2025-03-04 DIAGNOSIS — T86.09 GVHD AS COMPLICATION OF BONE MARROW TRANSPLANT (H): ICD-10-CM

## 2025-03-04 NOTE — PROGRESS NOTES
Pt is completing a home sleep test. Pt was instructed on how to put on the Noxturnal T3 device and associated equipment before going to bed and given the opportunity to practice putting it on before leaving the sleep center. Pt was reminded to bring the home sleep test kit back to the center tomorrow, at the scheduled time for download and reporting. Patient was instructed to complete study using the following treatment?  None  Neck circumference: 47 CM / 18 1/2 inches.  Device number: 29  Anastasiya Garcia CMA on 3/4/2025 at 1:15 PM

## 2025-03-05 ENCOUNTER — DOCUMENTATION ONLY (OUTPATIENT)
Dept: SLEEP MEDICINE | Facility: CLINIC | Age: 65
End: 2025-03-05
Payer: MEDICARE

## 2025-03-05 ENCOUNTER — MEDICAL CORRESPONDENCE (OUTPATIENT)
Dept: TRANSPLANT | Facility: CLINIC | Age: 65
End: 2025-03-05

## 2025-03-05 ASSESSMENT — SLEEP AND FATIGUE QUESTIONNAIRES
HOW LIKELY ARE YOU TO NOD OFF OR FALL ASLEEP WHILE WATCHING TV: SLIGHT CHANCE OF DOZING
HOW LIKELY ARE YOU TO NOD OFF OR FALL ASLEEP WHILE SITTING AND READING: SLIGHT CHANCE OF DOZING
HOW LIKELY ARE YOU TO NOD OFF OR FALL ASLEEP WHILE SITTING QUIETLY AFTER LUNCH WITHOUT ALCOHOL: SLIGHT CHANCE OF DOZING
HOW LIKELY ARE YOU TO NOD OFF OR FALL ASLEEP WHILE LYING DOWN TO REST IN THE AFTERNOON WHEN CIRCUMSTANCES PERMIT: HIGH CHANCE OF DOZING
HOW LIKELY ARE YOU TO NOD OFF OR FALL ASLEEP WHILE SITTING AND TALKING TO SOMEONE: WOULD NEVER DOZE
HOW LIKELY ARE YOU TO NOD OFF OR FALL ASLEEP WHILE SITTING INACTIVE IN A PUBLIC PLACE: WOULD NEVER DOZE
HOW LIKELY ARE YOU TO NOD OFF OR FALL ASLEEP WHEN YOU ARE A PASSENGER IN A CAR FOR AN HOUR WITHOUT A BREAK: SLIGHT CHANCE OF DOZING
HOW LIKELY ARE YOU TO NOD OFF OR FALL ASLEEP IN A CAR, WHILE STOPPED FOR A FEW MINUTES IN TRAFFIC: WOULD NEVER DOZE

## 2025-03-05 NOTE — PROGRESS NOTES
HST POST-STUDY QUESTIONNAIRE    What time did you go to bed?  10 pm  How long do you think it took to fall asleep?  20 min  What time did you wake up to start the day?  4:30 am  Did you get up during the night at all?  Yes  If you woke up, do you remember approximately what time(s)? 11:30 pm, 1:30 am ? Got up 4 times.  Did you have any difficulty with the equipment?  No  Did you us any type of treatment with this study?  None  Was the head of the bed elevated? No  Did you sleep in a recliner?  No  Did you stop using CPAP at least 3 days before this test?    Any other information you'd like us to know?

## 2025-03-05 NOTE — PROGRESS NOTES
This HSAT was performed using a Noxturnal T3 device which recorded snore, sound, movement activity, body position, nasal pressure, oronasal thermal airflow, pulse, oximetry and both chest and abdominal respiratory effort. HSAT data was restricted to the time patient states they were in bed.     HSAT was scored using 1B 4% hypopnea rule.     HST AHI (Non-PAT): 59.6  Snoring was reported as loud.  Time with SpO2 below 89% was 44.1 minutes.   Overall signal quality was good     Pt will follow up with sleep provider to determine appropriate therapy.

## 2025-03-05 NOTE — PROGRESS NOTES
Pt returned HST device. It was downloaded and forwarded data to the clinical specialist for scoring.   Anastasiya Garcia CMA on 3/5/2025 at 10:52 AM

## 2025-03-06 NOTE — PROCEDURES
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      Weight: 262.0 lbs  BMI: 34.6   [place]:   STOP BAN/8    Data: A full night home sleep study was performed recording the standard physiologic parameters including body position, movement, sound, nasal pressure, thermal oral airflow, chest and abdominal movements with respiratory inductance plethysmography, and oxygen saturation by pulse oximetry. Pulse rate was estimated by oximetry recording. This study was considered adequate based on > 4 hours of quality oximetry and respiratory recording. As specified by the AASM Manual for the Scoring of Sleep and Associated events, version 2.3, Rule VIII.D 1B, 4% oxygen desaturation scoring for hypopneas is used as a standard of care on all home sleep apnea testing.    Analysis Time: 333.2 minutes    Respiration:  Sleep Associated Hypoxemia: Sustained hypoxemia was present. Baseline oxygen saturation was 92%. Time with saturation less than 89% was 44.1 minutes. Time with saturation less than 90% was 83.4 minutes. The lowest oxygen saturation was 76.0%.  Snoring: Snoring was present 21% of the time with an average level of 71 dB. Duration of time snoring above 70 dB was 68 minutes.    Respiratory events: The home study revealed a presence of 93 obstructive apneas and 32 mixed and central apneas. There were 206 hypopneas  resulting in a combined apnea/hypopnea index [AHI] of 60 events per hour with  0 per hour supine, 0  per hour prone, 0 per hour upright, 54  per hour left side, and 65 per hour right side.    Position: Percent of time spent: Supine 0%, prone 0%, upright 0%, on left 48%, on right 52%.    Heart Rate: By Pulse Oximetry normal rate was noted.    Assessment:  Severe obstructive sleep apnea.  Sleep associated hypoxemia was present.    Recommendations:  Consider auto-CPAP at 5-15 cmH2O.  Suggest optimizing sleep hygiene and avoiding sleep deprivation  Weight management    Diagnosis Code(s): Obstructive Sleep Apnea G47.33, Hypoxemia G47.36, Snoring R06.83    Electronically signed by: Sebastian Winchester MD March 6, 2025  Diplomate, American Board of Internal Medicine, Sleep Medicine

## 2025-03-08 ENCOUNTER — HEALTH MAINTENANCE LETTER (OUTPATIENT)
Age: 65
End: 2025-03-08

## 2025-03-10 ENCOUNTER — OFFICE VISIT (OUTPATIENT)
Dept: SLEEP MEDICINE | Facility: CLINIC | Age: 65
End: 2025-03-10
Payer: MEDICARE

## 2025-03-10 VITALS
SYSTOLIC BLOOD PRESSURE: 108 MMHG | HEIGHT: 73 IN | HEART RATE: 81 BPM | BODY MASS INDEX: 35.35 KG/M2 | OXYGEN SATURATION: 97 % | DIASTOLIC BLOOD PRESSURE: 71 MMHG | WEIGHT: 266.7 LBS

## 2025-03-10 DIAGNOSIS — G47.33 OSA (OBSTRUCTIVE SLEEP APNEA): Primary | ICD-10-CM

## 2025-03-10 DIAGNOSIS — G47.36 HYPOXEMIA ASSOCIATED WITH SLEEP: ICD-10-CM

## 2025-03-10 PROCEDURE — 99215 OFFICE O/P EST HI 40 MIN: CPT | Performed by: NURSE PRACTITIONER

## 2025-03-10 PROCEDURE — 3074F SYST BP LT 130 MM HG: CPT | Performed by: NURSE PRACTITIONER

## 2025-03-10 PROCEDURE — 1126F AMNT PAIN NOTED NONE PRSNT: CPT | Performed by: NURSE PRACTITIONER

## 2025-03-10 PROCEDURE — 3078F DIAST BP <80 MM HG: CPT | Performed by: NURSE PRACTITIONER

## 2025-03-10 NOTE — PROGRESS NOTES
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  He presented with symptoms suggestive of obstructive sleep apnea.    Estimated body mass index is 35.19 kg/m  as calculated from the following:    Height as of this encounter: 1.854 m (6' 1\").    Weight as of this encounter: 121 kg (266 lb 11.2 oz).    Total score - [place]: (Patient-Rptd) 7 (3/5/2025  8:57 AM)   Total Score: (Patient-Rptd) 8 (2/27/2025  4:15 PM)  EDGARDO Total Score: (Patient-Rptd) 14 (3/5/2025  8:56 AM)       Home Sleep Apnea Testing - 3/04/2025: Weight: 262.0 lbs    Analysis Time: 333.2 minutes     Respiration:  Sleep Associated Hypoxemia: Sustained hypoxemia was present. Baseline oxygen saturation was 92%. Time with saturation less than 89% was 44.1 minutes. Time with saturation less than 90% was 83.4 minutes. The lowest oxygen saturation was 76.0%.  Snoring: Snoring was present 21% of the time with an average level of 71 dB. Duration of time snoring above 70 dB was 68 minutes.  Snoring was reported as loud.      Respiratory events: The home study revealed a presence of 93 obstructive apneas and 32 mixed and central apneas. There were 206 hypopneas resulting in a combined apnea/hypopnea index [AHI] of 60 events per hour with  0 per hour supine, 0  per hour prone, 0 per hour upright, 54  per hour left side, and 65 per hour right side.     Position: Percent of time spent: Supine 0%, prone 0%, upright 0%, on left 48%, on right " 52%.     Heart Rate: By Pulse Oximetry normal rate was noted.     Assessment:  Severe obstructive sleep apnea.  Sleep associated hypoxemia was present.      Overall signal quality was good   Signal quality of Oxymeter 99.2% Good  Nasal Cannula 100.0% Good  RIP belts 100.0% Good.     Nik MCDONALD Elijah reports that he slept Poor .     Home Sleep Apnea Testing was reviewed in detail today with Nik and a copy given to him for his records.    Past medical/surgical history, family history, social history, medications and allergies were reviewed.    Patient Active Problem List   Diagnosis    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)    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)    Status post bone marrow transplant (H)    Musa-Barr virus viremia    Generalized muscle weakness    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)    Shortness of breath    Influenza A    Postablative hypothyroidism    On corticosteroid therapy    History of Graves' disease    Fatigue, unspecified type    Combined forms of age-related cataract of both eyes    Dry eye    Hypogammaglobulinemia    Anemia in stage 3a chronic kidney disease (H)    Bacterial pneumonia    Chronic, continuous use of opioids    Constipation    Discitis of lumbar region    Edema of lower extremity    History of primary malignant neoplasm of kidney    History of pulmonary embolism    HTN (hypertension)    Hx of acute pancreatitis    Hyponatremia    Immunocompromised    Insomnia    Osteomyelitis of lumbar spine (H)    Other specified hypothyroidism    Peripheral nerve disease    Prediabetes    Primary renal cell carcinoma of native right kidney (H)    Pulmonary embolism (H)    RSV (acute bronchiolitis due to respiratory syncytial virus)    Sleep apnea    Tobacco use disorder    Unspecified adrenocortical insufficiency    Hyperkalemia    Class 2 severe obesity due to excess calories with serious comorbidity in adult (H)    Parainfluenza    Fever in  adult    Need for vaccination     Current Outpatient Medications   Medication Sig Dispense Refill    acyclovir (ZOVIRAX) 400 MG tablet Take 800 mg by mouth every 12 hours. Taking 800 mg BID      albuterol (PROAIR HFA/PROVENTIL HFA/VENTOLIN HFA) 108 (90 Base) MCG/ACT inhaler Inhale 2 puffs into the lungs every 6 hours as needed for shortness of breath, wheezing or cough. 18 g 0    dexAMETHasone alcohol-free (DECADRON) 0.1 MG/ML solution Swish and spit 10 mLs (1 mg) in mouth 2 times daily. (Patient taking differently: Swish and spit 1 mg in mouth 2 times daily as needed.) 500 mL 2    escitalopram (LEXAPRO) 10 MG tablet Take 1 tablet (10 mg) by mouth daily 30 tablet 3    levothyroxine (SYNTHROID/LEVOTHROID) 112 MCG tablet Take 1 tablet (112 mcg) by mouth daily. 90 tablet 4    LORazepam (ATIVAN) 1 MG tablet Take 1 tablet (1 mg) by mouth every 6 hours as needed for anxiety. PRN 20 tablet 0    metroNIDAZOLE (METROCREAM) 0.75 % external cream Apply topically 2 times daily Apply to the nose. (Patient taking differently: Apply topically 2 times daily as needed. Apply to the nose.) 45 g 3    nicotine polacrilex (NICORETTE) 4 MG gum Place 4 mg inside cheek daily.      Nutritional Supplements (ENSURE COMPLETE SHAKE) LIQD Take 11 mLs by mouth every morning      omeprazole (PRILOSEC) 40 MG DR capsule Take 1 capsule (40 mg) by mouth daily 30 capsule 3    penicillin V (VEETID) 250 MG tablet Take 1 tablet (250 mg) by mouth 2 times daily. 60 tablet 2    polyethylene glycol (MIRALAX) 17 GM/Dose powder Take by mouth daily as needed.      predniSONE (DELTASONE) 10 MG tablet Take 1 tablet (10 mg) by mouth daily. Alternates 10 and 15 mg every other day      rosuvastatin (CRESTOR) 5 MG tablet Take 1 tablet (5 mg) by mouth daily. 30 tablet 3    sennosides (SENOKOT) 8.6 MG tablet Take 1 tablet by mouth 3 times daily as needed for constipation 90 tablet 0    sulfamethoxazole-trimethoprim (BACTRIM DS) 800-160 MG tablet Take 1 tablet by mouth  "Every Mon, Wed, Fri Morning.      tacrolimus (PROTOPIC) 0.1 % external ointment Apply topically 2 times daily (Patient taking differently: Apply topically 2 times daily as needed.) 30 g 1    predniSONE (DELTASONE) 5 MG tablet Use as directed for a prednisone taper (Patient not taking: Reported on 3/10/2025) 50 tablet 2     No current facility-administered medications for this visit.     /71   Pulse 81   Ht 1.854 m (6' 1\")   Wt 121 kg (266 lb 11.2 oz)   SpO2 97%   BMI 35.19 kg/m      Impression/Plan:  Severe Obstructive Sleep Apnea.   Sleep associated hypoxemia was present.  - Comprehensive DME    Reviewed HST results with patient and wife in detail and answered questions regarding treatment options which were also discussed in detail.    Treatment options discussed today including  auto-CPAP at 5-15 cmH2O or polysomnography with full night PAP titration.    Elected treatment with auto-CPAP at 5-15 cmH2O. A comprehensive DME order was placed for new APAP device, mask of choice (recommend nasal pillow mask), and supplies sent to [facility], per patient request.  We also discussed usual use/compliance requirements associated with new PAP device therapy.    Follow-up in approximately 2-3 months after obtaining new APAP device to review download data and use/compliance.    45 minutes spent with patient with >50% spent in counseling, chart review/documentation, and coordination of care on the date of the encounter.      PEE Walker CNP  Sleep Medicine      CC:  Yamilet Hinoojsa,     This note was written with the assistance of the Dragon voice-dictation technology software. The final document, although reviewed, may contain errors. For corrections, please contact the office.    "

## 2025-03-10 NOTE — PATIENT INSTRUCTIONS
"MY INFORMATION ON SLEEP APNEA-  Nik Nava    DOCTOR : PEE Walker CNP  SLEEP CENTER :      MY CONTACT NUMBER:   [facility]  [phone]  [facility]   [phone]  [facility]   [phone]      [facility]  [phone]    DME: TBD    Key Points:  1. What is Obstructive Sleep Apnea (KISHAN)? KISHAN is the most common type of sleep apnea. Apnea literally means, \"without breath.\" It is characterized by repetitive pauses in breathing, despite continued effort to breathe, and is usually associated with a reduction in blood oxygen saturation. Apneas can last 10 to over 60 seconds. It is caused by narrowing or collapse of the upper airway as muscles relax during sleep.   2. What are the consequences of KISHAN? Symptoms include: daytime sleepiness- possibly increasing the risk of falling asleep while driving, unrefreshing/restless sleep, snoring, insomnia, waking frequently to urinate, waking with heartburn or reflux, reduced concentration and memory, and morning headaches. Other health consequences may include development of high blood pressure. Untreated KISHAN also can contribute to heart disease, stroke and diabetes.   3. What are the treatment options? In most situations, sleep apnea is a lifelong disease that must be managed with daily therapy. Continuous Positive Airway (CPAP) is the most reliable treatment. A mouthguard to hold your jaw forward is usually the next most reliable option. Other options include postioning devices (to keep you off your back), nasal valves, tongue retaining device, weight loss, surgery. There is more detail about these options toward the end of this document.  4. What are the most important things to remember about using CPAP?     WHERE CAN I FIND MORE INFORMATION?    American Academy of Sleep Medicine Patient information on sleep disorders:  http://yoursleep.aasmnet.org    CPAP -  WHY AND HOW? " "                Continuous positive airway pressure, or CPAP, is the most effective treatment for obstructive sleep apnea. It works by blowing room air, through a mask, to hold your throat open. A decision to use CPAP is a major step forward in the pursuit of a healthier life. The successful use of CPAP will help you breathe easier, sleep better and live healthier. Using CPAP can be a positive experience if you keep these soliz points in mind:  Commitment  CPAP is not a quick fix for your problem. It involves a long-term commitment to improve your sleep and your health.    Communication  Stay in close communication with both your sleep doctor and your CPAP supplier. Ask lots of questions and seek help when you need it.    Consistency  Use CPAP all night, every night and for every nap. You will receive the maximum health benefits from CPAP when you use it every time that you sleep. This will also make it easier for your body to adjust to the treatment.    Correction  The first machine and mask that you try may not be the best ones for you. Work with your sleep doctor and your CPAP supplier to make corrections to your equipment selection. Ask about trying a different type of machine or mask if you have ongoing problems. Make sure that your mask is a good fit and learn to use your equipment properly.    Challenge  Tell a family member or close friend to ask you each morning if you used your CPAP the previous night. Have someone to challenge you to give it your best effort.    Connection   Your adjustment to CPAP will be easier if you are able to connect with others who use the same treatment. Ask your sleep doctor if there is a support group in your area for people who have sleep apnea, or look for one on the Internet.  Comfort   Increase your level of comfort by using a saline spray, decongestant or heated humidifier if CPAP irritates your nose, mouth or throat. Use your unit's \"ramp\" setting to slowly get used to the " air pressure level. There may be soft pads you can buy that will fit over your mask straps. Look on www.CPAP.com for accessories that can help make CPAP use more comfortable.  Cleaning   Clean your mask, tubing and headgear on a regular basis. Put this time in your schedule so that you don't forget to do it. Check and replace the filters for your CPAP unit and humidifier.    Completion   Although you are never finished with CPAP therapy, you should reward yourself by celebrating the completion of your first month of treatment. Expect this first month to be your hardest period of adjustment. It will involve some trial and error as you find the machine, mask and pressure settings that are right for you.    Continuation  After your first month of treatment, continue to make a daily commitment to use your CPAP all night, every night and for every nap.    CPAP-Tips to starting with success:  Begin using your CPAP for short periods of time during the day while you watch TV or read.    Use CPAP every night and for every nap. Using it less often reduces the health benefits and makes it harder for your body to get used to it.    Newer CPAP models are virtually silent; however, if you find the sound of your CPAP machine to be bothersome, place the unit under your bed to dampen the sound.     Make small adjustments to your mask, tubing, straps and headgear until you get the right fit. Tightening the mask may actually worsen the leak.  If it leaves significant marks on your face or irritates the bridge of your nose, it may not be the best mask for you.  Speak with the person who supplied the mask and consider trying other masks. Insurances will allow you to try different masks during the first month of starting CPAP.  Insurance also covers a new mask, hose and filter about every 6 months.    Use a saline nasal spray to ease mild nasal congestion. Neti-Pot or saline nasal rinses may also help. Nasal gel sprays can help reduce  nasal dryness.  Biotene mouthwash can be helpful to protect your teeth if you experience frequent dry mouth.  Dry mouth may be a sign of air escaping out of your mouth or out of the mask in the case of a full face mask.  Speak with your provider if you expect that is the case.     Take a nasal decongestant to relieve more severe nasal or sinus congestion.  Do not use Afrin (oxymetazoline) nasal spray more than 3 days in a row.  Speak with your sleep doctor if your nasal congestion is chronic.    Use a heated humidifier that fits your CPAP model to enhance your breathing comfort. Adjust the heat setting up if you get a dry nose or throat, down if you get condensation in the hose or mask.  Position the CPAP lower than you so that any condensation in the hose drains back into the machine rather than towards the mask.    Try a system that uses nasal pillows if traditional masks give you problems.    Clean your mask, tubing and headgear once a week. Make sure the equipment dries fully.    Regularly check and replace the filters for your CPAP unit and humidifier.    Work closely with your sleep provider and your CPAP supplier to make sure that you have the machine, mask and air pressure setting that works best for you. It is better to stop using it and call your provider to solve problems than to lay awake all night frustrated with the device.  Weight Loss:    Weight loss decreases severity of sleep apnea in most people with obesity. For those with mild obesity who have developed snoring with weight gain, even 15-30 pound weight loss can improve and occasionally eliminate sleep apnea.  Structured and life-long dietary and health habits are necessary to lose weight and keep healthier weight levels.     Though there are significant health benefits from weight loss, long-term weight loss is very difficult to achieve- studies show success with dietary management in less than 10% of people. In addition, substantial weight loss  may require years of dietary control and may be difficult if patients have severe obesity. In these cases, surgical management may be considered.    If you are interested in methods for weight loss, you should review the options discussed at the National Institutes of Health patient information sites:     http://win.niddk.nih.gov/publications/index.htm  http:/www.health.nih.gov/topic/WeightLossDieting    Bariatric programs offer counseling in all methods of weight loss:    Http:/[email or web address]    Your BMI is Body mass index is 35.19 kg/m .    Weight management plan: Patient was referred to their PCP to discuss a diet and exercise plan.    Body mass index (BMI) is one way to tell whether you are at a healthy weight, overweight, or obese. It measures your weight in relation to your height.  A BMI of 18.5 to 24.9 is in the healthy range. A person with a BMI of 25 to 29.9 is considered overweight, and someone with a BMI of 30 or greater is considered obese.  Another way to find out if you are at risk for health problems caused by overweight and obesity is to measure your waist. If you are a woman and your waist is more than 35 inches, or if you are a man and your waist is more than 40 inches, your risk of disease may be higher.  More than two-thirds of American adults are considered overweight or obese. Being overweight or obese increases the risk for further weight gain.  Excess weight may lead to heart disease and diabetes. Creating and following plans for healthy eating and physical activity may help you improve your health.    Methods for maintaining or losing weight.    Weight control is part of healthy lifestyle and includes exercise, emotional health, and healthy eating habits.  Careful eating habits lifelong is the mainstay of weight control.  Though there are significant health benefits from weight loss, long-term weight loss with diet alone may be very  difficult to achieve- studies show long-term success with dietary management in less than 10% of people. Attaining a healthy weight may be especially difficult to achieve in those with severe obesity. In some cases, medications, devices and surgical management might be considered.    What can you do?    If you are overweight or obese and are interested in methods for weight loss, you should discuss this with your provider. In addition, we recommend that you review healthy life styles and methods for weight loss available through the National Institutes of Health patient information sites:     http://win.niddk.nih.gov/publications/index.htm

## 2025-03-11 ENCOUNTER — CARE COORDINATION (OUTPATIENT)
Dept: SLEEP MEDICINE | Facility: CLINIC | Age: 65
End: 2025-03-11
Payer: MEDICARE

## 2025-03-11 DIAGNOSIS — D89.813 GVHD AS COMPLICATION OF BONE MARROW TRANSPLANT (H): Primary | ICD-10-CM

## 2025-03-11 DIAGNOSIS — Z94.81 S/P BONE MARROW TRANSPLANT (H): ICD-10-CM

## 2025-03-11 DIAGNOSIS — T86.09 GVHD AS COMPLICATION OF BONE MARROW TRANSPLANT (H): Primary | ICD-10-CM

## 2025-03-11 NOTE — NURSING NOTE
Chief Complaint   Patient presents with     RECHECK     Return visit.     Blood pressure 125/73, pulse 77, temperature 98  F (36.7  C), weight 113.5 kg (250 lb 3.2 oz), SpO2 96 %.    JESS GUY    
No assistance needed

## 2025-03-12 ENCOUNTER — ONCOLOGY VISIT (OUTPATIENT)
Dept: TRANSPLANT | Facility: CLINIC | Age: 65
End: 2025-03-12
Attending: INTERNAL MEDICINE
Payer: MEDICARE

## 2025-03-12 ENCOUNTER — APPOINTMENT (OUTPATIENT)
Dept: LAB | Facility: CLINIC | Age: 65
End: 2025-03-12
Payer: MEDICARE

## 2025-03-12 VITALS
DIASTOLIC BLOOD PRESSURE: 75 MMHG | SYSTOLIC BLOOD PRESSURE: 121 MMHG | HEART RATE: 80 BPM | OXYGEN SATURATION: 97 % | TEMPERATURE: 98 F | WEIGHT: 263.7 LBS | RESPIRATION RATE: 16 BRPM | BODY MASS INDEX: 34.79 KG/M2

## 2025-03-12 DIAGNOSIS — Z94.81 S/P BONE MARROW TRANSPLANT (H): ICD-10-CM

## 2025-03-12 DIAGNOSIS — D80.1 HYPOGAMMAGLOBULINEMIA: ICD-10-CM

## 2025-03-12 DIAGNOSIS — D89.813 GVHD AS COMPLICATION OF BONE MARROW TRANSPLANT (H): ICD-10-CM

## 2025-03-12 DIAGNOSIS — T86.09 GVHD AS COMPLICATION OF BONE MARROW TRANSPLANT (H): ICD-10-CM

## 2025-03-12 DIAGNOSIS — B59 PNEUMONIA OF BOTH UPPER LOBES DUE TO PNEUMOCYSTIS JIROVECII (H): ICD-10-CM

## 2025-03-12 DIAGNOSIS — Z79.899 NEED FOR PROPHYLACTIC CHEMOTHERAPY: ICD-10-CM

## 2025-03-12 DIAGNOSIS — R50.9 FEVER, UNSPECIFIED FEVER CAUSE: ICD-10-CM

## 2025-03-12 LAB
ALBUMIN SERPL BCG-MCNC: 4.2 G/DL (ref 3.5–5.2)
ALP SERPL-CCNC: 77 U/L (ref 40–150)
ALT SERPL W P-5'-P-CCNC: 26 U/L (ref 0–70)
ANION GAP SERPL CALCULATED.3IONS-SCNC: 10 MMOL/L (ref 7–15)
AST SERPL W P-5'-P-CCNC: 32 U/L (ref 0–45)
BASOPHILS # BLD AUTO: 0.1 10E3/UL (ref 0–0.2)
BASOPHILS NFR BLD AUTO: 1 %
BILIRUB SERPL-MCNC: 0.5 MG/DL
BUN SERPL-MCNC: 41 MG/DL (ref 8–23)
CALCIUM SERPL-MCNC: 9.8 MG/DL (ref 8.8–10.4)
CHLORIDE SERPL-SCNC: 105 MMOL/L (ref 98–107)
CREAT SERPL-MCNC: 1.27 MG/DL (ref 0.67–1.17)
EGFRCR SERPLBLD CKD-EPI 2021: 62 ML/MIN/1.73M2
EOSINOPHIL # BLD AUTO: 0.1 10E3/UL (ref 0–0.7)
EOSINOPHIL NFR BLD AUTO: 1 %
ERYTHROCYTE [DISTWIDTH] IN BLOOD BY AUTOMATED COUNT: 16.8 % (ref 10–15)
GLUCOSE SERPL-MCNC: 103 MG/DL (ref 70–99)
HCO3 SERPL-SCNC: 25 MMOL/L (ref 22–29)
HCT VFR BLD AUTO: 35.5 % (ref 40–53)
HGB BLD-MCNC: 12 G/DL (ref 13.3–17.7)
IMM GRANULOCYTES # BLD: 0.1 10E3/UL
IMM GRANULOCYTES NFR BLD: 1 %
LYMPHOCYTES # BLD AUTO: 2.8 10E3/UL (ref 0.8–5.3)
LYMPHOCYTES NFR BLD AUTO: 31 %
MCH RBC QN AUTO: 33.7 PG (ref 26.5–33)
MCHC RBC AUTO-ENTMCNC: 33.8 G/DL (ref 31.5–36.5)
MCV RBC AUTO: 100 FL (ref 78–100)
MONOCYTES # BLD AUTO: 1.2 10E3/UL (ref 0–1.3)
MONOCYTES NFR BLD AUTO: 13 %
NEUTROPHILS # BLD AUTO: 5 10E3/UL (ref 1.6–8.3)
NEUTROPHILS NFR BLD AUTO: 54 %
NRBC # BLD AUTO: 0 10E3/UL
NRBC BLD AUTO-RTO: 0 /100
PLATELET # BLD AUTO: 228 10E3/UL (ref 150–450)
POTASSIUM SERPL-SCNC: 4.2 MMOL/L (ref 3.4–5.3)
PROT SERPL-MCNC: 6.7 G/DL (ref 6.4–8.3)
RBC # BLD AUTO: 3.56 10E6/UL (ref 4.4–5.9)
SODIUM SERPL-SCNC: 140 MMOL/L (ref 135–145)
WBC # BLD AUTO: 9.1 10E3/UL (ref 4–11)

## 2025-03-12 PROCEDURE — 85025 COMPLETE CBC W/AUTO DIFF WBC: CPT | Performed by: INTERNAL MEDICINE

## 2025-03-12 PROCEDURE — 82040 ASSAY OF SERUM ALBUMIN: CPT | Performed by: INTERNAL MEDICINE

## 2025-03-12 PROCEDURE — 84155 ASSAY OF PROTEIN SERUM: CPT | Performed by: INTERNAL MEDICINE

## 2025-03-12 PROCEDURE — G0463 HOSPITAL OUTPT CLINIC VISIT: HCPCS | Performed by: INTERNAL MEDICINE

## 2025-03-12 PROCEDURE — 36591 DRAW BLOOD OFF VENOUS DEVICE: CPT | Performed by: INTERNAL MEDICINE

## 2025-03-12 PROCEDURE — G2211 COMPLEX E/M VISIT ADD ON: HCPCS | Performed by: INTERNAL MEDICINE

## 2025-03-12 PROCEDURE — 250N000011 HC RX IP 250 OP 636: Performed by: INTERNAL MEDICINE

## 2025-03-12 PROCEDURE — 82310 ASSAY OF CALCIUM: CPT | Performed by: INTERNAL MEDICINE

## 2025-03-12 PROCEDURE — 99214 OFFICE O/P EST MOD 30 MIN: CPT | Mod: GC | Performed by: INTERNAL MEDICINE

## 2025-03-12 PROCEDURE — 82784 ASSAY IGA/IGD/IGG/IGM EACH: CPT | Performed by: INTERNAL MEDICINE

## 2025-03-12 RX ORDER — HEPARIN SODIUM (PORCINE) LOCK FLUSH IV SOLN 100 UNIT/ML 100 UNIT/ML
500 SOLUTION INTRAVENOUS ONCE
Status: COMPLETED | OUTPATIENT
Start: 2025-03-12 | End: 2025-03-12

## 2025-03-12 RX ADMIN — Medication 500 UNITS: at 10:13

## 2025-03-12 ASSESSMENT — PAIN SCALES - GENERAL: PAINLEVEL_OUTOF10: NO PAIN (0)

## 2025-03-12 NOTE — PROGRESS NOTES
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     Recently with diagnosis of PJP pneumonia. 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    HPI:   Today feels reasonably well. He continues to have the chronic fatigue, recent diagnosis of severe GVHD in which he is working on getting CPAP. Has some intermittent mouth tenderness and eye irritation, but for the most part he has no active signs or symptoms of GVHD and his mouth is not bothering him. He has noticed no rash, or other signs and symptoms of GVHD. He has tapered the prednisone down to 10 mg daily and he is tolerated this well.     ROS: 10 point Review of systems was negative except as detailed above     PHYSICAL EXAM          Blood pressure 121/75, pulse 80, temperature 98  F (36.7  C), temperature source Oral, resp. rate 16, weight 119.6 kg (263 lb 11.2 oz), SpO2 97%.    Wt Readings from Last 4 Encounters:   03/12/25 119.6 kg (263 lb 11.2 oz)   03/10/25 121 kg (266 lb 11.2 oz)   01/17/25 120.7 kg (266 lb 1.6 oz)   01/07/25 118.4 kg (261 lb)      KPS: 80  General: NAD.  HEENT: sclera anicteric. Lichenoid changes of b/l buccal mucosa.  No active ulceration.  Lymph nodes: No lymphadenopathy in his head neck region, upper chest, or axilla  Lungs: CTA b/l  no wheezes  CV: RRR  no gallop  Abd: Soft, no tenderness to palpation  Ext/Skin: Scattered hyperpigmented lesion at back. Xerosis.    Chronic GVHD          3/12/2025    11:51 12/24/2024    11:48 12/11/2024    09:07   Chronic GVHD   Has chronic edwqu-blcidu-rvgd disease developed since the last entry? No No No   Is there persistence of chronic ncpmu-aagvzg-hglg disease since the last entry? Yes Yes Yes   Total Chronic Ycghq-Yyxuws-Vtlm Disease Score 2 2 2   Subjective Clinician Opinion of Severity Mild Mild Mild       Blood Counts     Recent Labs   Lab Test 03/12/25  1010 01/17/25  1209  01/07/25  1255 12/31/24  1410   HGB 12.0* 10.6* 11.2* 10.6*   HCT 35.5* 31.3* 32.7* 30.9*   WBC 9.1 10.2 8.1 7.8   ANEUTAUTO 5.0 7.8  --  5.4   ALYMPAUTO 2.8 1.4  --  1.0   AMONOAUTO 1.2 0.8  --  1.4*   AEOSAUTO 0.1 0.0  --  0.0   ABSBASO 0.1 0.0  --  0.0   NRBCMAN 0.0 0.0  --  0.0    144* 71* 141*       Chemistries     Basic Panel  Recent Labs   Lab Test 03/12/25  1010 01/17/25  1209 01/07/25  1255    136 138   POTASSIUM 4.2 4.2 4.4   CHLORIDE 105 103 104   CO2 25 24 23   BUN 41.0* 32.7* 35.7*   CR 1.27* 1.13 1.23*   * 129* 118*      Calcium, Magnesium, Phosphorus  Recent Labs   Lab Test 03/12/25  1010 01/17/25  1209 01/07/25  1255 12/24/24  1038 12/20/24  0455 12/19/24  0254 12/18/24  0758   DAMON 9.8 9.5 9.6   < > 9.2 8.7* 9.0   MAG  --   --   --   --  1.9 1.7 1.7   PHOS  --   --  2.9  --  2.9 2.5  --     < > = values in this interval not displayed.      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     1. Acute lymphoblastic leukemia, Carter chromosome positive. S/p VERÓNICA allo sib PBSCT. Has not received TKI maintenance due to active GVHD  Most recent restaging at 2Y consistent with MRD negative CR. BM % donor. FISH No evidence of BCR::ABL1 fusion. CG No recipient (male) cells or cells with a t(9;22) or other clonal chromosomal abnormality were detected among the 20 metaphases analyzed.      - BCR::ABL monitoring quarterly    2. HEME:   Counts recovered, transfusion independent.    Anemia of chronic disease  Fluctuated during prior infection. Periodically monitor ferritin.     3. 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   Liver cGVHD biopsy  proven 2/21/2022; resolved.     Ocular GVHD - NIH score 1  Follows with ophthalmology.  - Currently using scleral lenses. Not on topical steroids now.     Oral GVHD - NIH score 1  Lichenoid change of L buccal mucosa. Adequate pain control.  - Dexamethasone s/s BID    Systemic treatment for GVHD  Corticosteroids - On steroid chronically due to frequent flare of GVH with taper. Had GVHD flare while tapered down to 10 mg daily, so currently on a very slow taper course.  - Able to tolerate taper to prednisone 10 mg daily without signs of GVH flare. We will continue further with prednisone 10 mg/7.5 mg daily for one month, then 7.5 mg daily for one month.  Plan to discuss with endocrinology on converting to hydrocortisone once prednisone is lower than 7.5 mg daily.    GVHD history  cGVHD therapy started on February 24 2022  - Baseline NIH scoring 2/24/2022: Skin 2 maculopapular rash/erythema with no sclerotic features, mouth score 1 mild symptoms with lichenoid changes less than 25%, eyes score 2 moderate symptoms without new visual impairments due to KCS requiring lubricant eyedrops more than 3 times a day, overall mild scale for her provider     Previous therapies  Tacrolimus - Experienced mild NEGRA, hyperkalemia, hypomagnesemia, and reports some tremors and cramps. Switched to sirolimus Sep 2022.  Sirolimus - Discontinued Oct 2022 due to GVH flare and significant fluid retention and proteinuria.  Belumosudil - Patient intolerant/with disease flares on tacrolimus and sirolimus as above. This was started on Oct 2022 - skin/liver/oral GVHD inactive, and occular symptoms controlled/symptomatic improvement. Discontinued Oct 2023 due to recurrent infections.  Ruxolitinib - Started in Dec 2024 in an attempt to taper him off of steroid. However, was feeling unwell (coincide with viral infection), decision was made to discontinue.    4. ID  PJP pneumonia  Recent history of persistent fever with patchy peribronchovascular  groundglass opacities on CT (10/21/2024). Extensive work-up revealed PJP positive from BAL (11/1/2024)  - Completed high-dose bactrim per ID recommendation for 21 days    Rhinovirus infection  Parainfluenza infection  Tested positive for rhinovirus on 11/18/2024 after he developed fatigue, malaise, URI symptoms. Also had parainfluenza URI in Dec 2024.    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  Up to date for influenza, COVID, and RSV vaccines.  Complete pneumococcal, HiB, and meningococcal vaccination.    5. Renal  History of RCC s/p R nephrectomy in 2007.  Baseline Cr around 1.2-1.3, will continue to monitor.    6. Endo:   - Hx of graves disease; On synthroid follows with endocrine  - HLD; On rosuvastatin    7. Psych:   - Situational anxiety; Lexapro 10mg daily.     Plan:  - Taper prednisone 10/7.5 mg daily for one month, then 7.5 mg daily for one month (will taper by 2.5 mg every other month)  - continue acyclovir, PenVK  - RTC 2 month for GVHD review and to continue taper    Patient was seen and plan of care was discussed with attending physician Dr. Cote.    Rohan Joshi MD  Hematology/Medical Oncology/BMT (PGY-6)  P: [phone]

## 2025-03-12 NOTE — NURSING NOTE
Chief Complaint   Patient presents with    Port Draw     Labs drawn from port by RN     Port accessed with 20 gauge, 3/4 inch, flat needle by RN, labs collected, line flushed with saline and heparin. Port de-accessed.  Vitals taken. Pt checked in for appointment(s).     Vika Lemus RN

## 2025-03-12 NOTE — LETTER
3/12/2025      Nik Nava  [address] [id number]      Dear Colleague,    Thank you for referring your patient, iNk Nava, to the [facility] [place]. Please see a copy of my visit note below.    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     Recently with diagnosis of PJP pneumonia. 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    HPI:   Today feels reasonably well. He continues to have the chronic fatigue, recent diagnosis of severe GVHD in which he is working on getting CPAP. Has some intermittent mouth tenderness and eye irritation, but for the most part he has no active signs or symptoms of GVHD and his mouth is not bothering him. He has noticed no rash, or other signs and symptoms of GVHD. He has tapered the prednisone down to 10 mg daily and he is tolerated this well.     ROS: 10 point Review of systems was negative except as detailed above     PHYSICAL EXAM          Blood pressure 121/75, pulse 80, temperature 98  F (36.7  C), temperature source Oral, resp. rate 16, weight 119.6 kg (263 lb 11.2 oz), SpO2 97%.    Wt Readings from Last 4 Encounters:   03/12/25 119.6 kg (263 lb 11.2 oz)   03/10/25 121 kg (266 lb 11.2 oz)   01/17/25 120.7 kg (266 lb 1.6 oz)   01/07/25 118.4 kg (261 lb)      KPS: 80  General: NAD.  HEENT: sclera anicteric. Lichenoid changes of b/l buccal mucosa.  No active ulceration.  Lymph nodes: No lymphadenopathy in his head neck region, upper chest, or axilla  Lungs: CTA b/l  no wheezes  CV: RRR  no gallop  Abd: Soft, no tenderness to palpation  Ext/Skin: Scattered hyperpigmented lesion at back. Xerosis.    Chronic GVHD          3/12/2025    11:51 12/24/2024    11:48 12/11/2024    09:07   Chronic GVHD   Has chronic ywvuj-fjnhra-vrcy disease developed since the last entry? No No No   Is there persistence  of chronic xfhhj-mjkdvr-obud disease since the last entry? Yes Yes Yes   Total Chronic Cdffh-Gygvhh-Lsrc Disease Score 2 2 2   Subjective Clinician Opinion of Severity Mild Mild Mild       Blood Counts     Recent Labs   Lab Test 03/12/25  1010 01/17/25  1209 01/07/25  1255 12/31/24  1410   HGB 12.0* 10.6* 11.2* 10.6*   HCT 35.5* 31.3* 32.7* 30.9*   WBC 9.1 10.2 8.1 7.8   ANEUTAUTO 5.0 7.8  --  5.4   ALYMPAUTO 2.8 1.4  --  1.0   AMONOAUTO 1.2 0.8  --  1.4*   AEOSAUTO 0.1 0.0  --  0.0   ABSBASO 0.1 0.0  --  0.0   NRBCMAN 0.0 0.0  --  0.0    144* 71* 141*       Chemistries     Basic Panel  Recent Labs   Lab Test 03/12/25  1010 01/17/25  1209 01/07/25  1255    136 138   POTASSIUM 4.2 4.2 4.4   CHLORIDE 105 103 104   CO2 25 24 23   BUN 41.0* 32.7* 35.7*   CR 1.27* 1.13 1.23*   * 129* 118*      Calcium, Magnesium, Phosphorus  Recent Labs   Lab Test 03/12/25  1010 01/17/25  1209 01/07/25  1255 12/24/24  1038 12/20/24  0455 12/19/24  0254 12/18/24  0758   DAMON 9.8 9.5 9.6   < > 9.2 8.7* 9.0   MAG  --   --   --   --  1.9 1.7 1.7   PHOS  --   --  2.9  --  2.9 2.5  --     < > = values in this interval not displayed.      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     1. Acute lymphoblastic leukemia, [place] chromosome positive. S/p VERÓNICA allo sib PBSCT. Has not received TKI maintenance due to active GVHD  Most recent restaging at 2Y consistent with MRD negative CR. BM % donor. FISH No evidence of BCR::ABL1 fusion. CG No recipient (male) cells or cells with a t(9;22) or other clonal chromosomal abnormality were detected among the 20 metaphases analyzed.      - BCR::ABL monitoring  quarterly    2. HEME:   Counts recovered, transfusion independent.    Anemia of chronic disease  Fluctuated during prior infection. Periodically monitor ferritin.     3. 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     Ocular GVHD - NIH score 1  Follows with ophthalmology.  - Currently using scleral lenses. Not on topical steroids now.     Oral GVHD - NIH score 1  Lichenoid change of L buccal mucosa. Adequate pain control.  - Dexamethasone s/s BID    Systemic treatment for GVHD  Corticosteroids - On steroid chronically due to frequent flare of GVH with taper. Had GVHD flare while tapered down to 10 mg daily, so currently on a very slow taper course.  - Able to tolerate taper to prednisone 10 mg daily without signs of GVH flare. We will continue further with prednisone 10 mg/7.5 mg daily for one month, then 7.5 mg daily for one month.  Plan to discuss with endocrinology on converting to hydrocortisone once prednisone is lower than 7.5 mg daily.    GVHD history  cGVHD therapy started on February 24 2022  - Baseline NIH scoring 2/24/2022: Skin 2 maculopapular rash/erythema with no sclerotic features, mouth score 1 mild symptoms with lichenoid changes less than 25%, eyes score 2 moderate symptoms without new visual impairments due to KCS requiring lubricant eyedrops more than 3 times a day, overall mild scale for her provider     Previous therapies  Tacrolimus - Experienced mild NEGRA, hyperkalemia, hypomagnesemia, and reports some tremors and cramps. Switched to sirolimus Sep 2022.  Sirolimus - Discontinued Oct 2022 due to GVH flare and significant fluid retention and proteinuria.  Belumosudil - Patient intolerant/with disease flares on tacrolimus and sirolimus as above. This was started on Oct 2022 - skin/liver/oral GVHD inactive, and occular symptoms controlled/symptomatic improvement. Discontinued Oct 2023 due to recurrent  infections.  Ruxolitinib - Started in Dec 2024 in an attempt to taper him off of steroid. However, was feeling unwell (coincide with viral infection), decision was made to discontinue.    4. ID  PJP pneumonia  Recent history of persistent fever with patchy peribronchovascular groundglass opacities on CT (10/21/2024). Extensive work-up revealed PJP positive from BAL (11/1/2024)  - Completed high-dose bactrim per ID recommendation for 21 days    Rhinovirus infection  Parainfluenza infection  Tested positive for rhinovirus on 11/18/2024 after he developed fatigue, malaise, URI symptoms. Also had parainfluenza URI in Dec 2024.    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  Up to date for influenza, COVID, and RSV vaccines.  Complete pneumococcal, HiB, and meningococcal vaccination.    5. Renal  History of RCC s/p R nephrectomy in 2007.  Baseline Cr around 1.2-1.3, will continue to monitor.    6. Endo:   - Hx of graves disease; On synthroid follows with endocrine  - HLD; On rosuvastatin    7. Psych:   - Situational anxiety; Lexapro 10mg daily.     Plan:  - Taper prednisone 10/7.5 mg daily for one month, then 7.5 mg daily for one month (will taper by 2.5 mg every other month)  - continue acyclovir, PenVK  - RTC 2 month for GVHD review and to continue taper    Patient was seen and plan of care was discussed with attending physician Dr. Cote.    Rohan Joshi MD  Hematology/Medical Oncology/BMT (PGY-6)  P: [phone]      Attestation signed by Dominique Cote MD at 3/14/2025  1:06 PM:    DOMINIQUE COTE MD  March 14, 2025      Again, thank you for allowing me to participate in the care of your patient.         Sincerely,        DOMINIQUE BRITTON MD    Electronically signed

## 2025-03-12 NOTE — NURSING NOTE
"Oncology Rooming Note    March 12, 2025 10:24 AM   Nik Nava is a 64 year old male who presents for:    Chief Complaint   Patient presents with    Port Draw     Labs drawn from port by RN    Oncology Clinic Visit     Hypogammaglobulinemia     Initial Vitals: /75 (BP Location: Right arm, Patient Position: Sitting, Cuff Size: Adult Large)   Pulse 80   Temp 98  F (36.7  C) (Oral)   Resp 16   Wt 119.6 kg (263 lb 11.2 oz)   SpO2 97%   BMI 34.79 kg/m   Estimated body mass index is 34.79 kg/m  as calculated from the following:    Height as of 3/10/25: 1.854 m (6' 1\").    Weight as of this encounter: 119.6 kg (263 lb 11.2 oz). Body surface area is 2.48 meters squared.  No Pain (0) Comment: Data Unavailable   No LMP for male patient.  Allergies reviewed: Yes  Medications reviewed: Yes    Medications: Medication refills not needed today.  Pharmacy name entered into [facility]:    [facility] - [place], [place] - [address] [facility] [place], [place] - [phone]  [facility] - [place], [place] - [address]/[id number] - [place], [place] - [address], [place] [address] [facility]    Frailty Screening:   Is the patient here for a new oncology consult visit in cancer care? 2. No    PHQ9:  Did this patient require a PHQ9?: No      Clinical concerns: Currently in the process of getting a CPAP. Continues to have fatigue.      Shady Wynne LPN    "

## 2025-03-13 ENCOUNTER — DOCUMENTATION ONLY (OUTPATIENT)
Dept: PHARMACY | Facility: CLINIC | Age: 65
End: 2025-03-13
Payer: MEDICARE

## 2025-03-13 LAB — IGG SERPL-MCNC: 575 MG/DL (ref 610–1616)

## 2025-03-13 NOTE — PROGRESS NOTES
Pharmacist IVIG Stewardship Program    Diagnosis: Hypogammaglobulinemia   Date  4/12/24   IgG Level (mg/dL) 352     Current Dosing Regimen: Privigen 40g IV every 28 days PRN for IgG <400 mg/dL  Patient is dosed as needed based on an IgG levels of less than 400 mg/dL to ensure the lowest amount of medication is administered to achieve beneficial outcomes.  Pre-medications:   Acetaminophen 650 mg by mouth, 30 minutes prior to infusion  Diphenhydramine 50 mg by mouth, 30 minutes prior to infusion  Hydrocortisone 100 mg IV, 30 minutes prior to infusion  Current Titration Regimen: Start infusion at 0.5 ml/kg/hr x 15 min. If tolerated, increase rate by 0.5 ml/kg/hr every 15 min to a maximum of 4 ml/kg/hr.  Previously Tried Products: None     Side Effects: Unable to reach patient to discuss.     Interventions: Lab review completed. .     Assessment:   Date  3/12/2025   IgG Level (mg/dL) 575   Patient is appropriate for additional IVIG therapy when their level is within parameter. IVIG infusions are effective in increasing IgG levels to an appropriate level to minimize patient's risk of infection. Patient should continue on treatment as they have been per provider order. Without therapy their levels would put them at an increased risk of infections.    Plan: Patient is not okay to proceed with infusions at this time as their level is above their provider ordered parameter. They will need to have labs drawn again next month to assess the need of ongoing infusions moving forward.     Next Review Needed: 4/2025    Ceasar Taveras PharmD  Medication Access Pharmacist

## 2025-03-19 ENCOUNTER — TELEPHONE (OUTPATIENT)
Dept: INFECTIOUS DISEASES | Facility: CLINIC | Age: 65
End: 2025-03-19
Payer: MEDICARE

## 2025-03-19 NOTE — TELEPHONE ENCOUNTER
"Called patient and relayed Dr. Markham's check out notes to him    \"Will need COVID-19 booster in late March/early April\"    He has this scheduled to have done tomorrow, 3/20/2025, and was confirming if he needed it.    Mita Valladares, [facility]    "

## 2025-03-19 NOTE — TELEPHONE ENCOUNTER
[facility] Call Center    Phone Message    May a detailed message be left on voicemail: yes     Reason for Call: Other: Please call Nik he has questions about the covid shot, please call asap thank you     Action Taken:   Other: ID  Travel Screening: Not Applicable     Date of Service:

## 2025-03-25 ENCOUNTER — ANCILLARY PROCEDURE (OUTPATIENT)
Dept: CT IMAGING | Facility: CLINIC | Age: 65
End: 2025-03-25
Attending: INTERNAL MEDICINE
Payer: MEDICARE

## 2025-03-25 ENCOUNTER — OFFICE VISIT (OUTPATIENT)
Dept: PULMONOLOGY | Facility: CLINIC | Age: 65
End: 2025-03-25
Attending: INTERNAL MEDICINE
Payer: MEDICARE

## 2025-03-25 VITALS — SYSTOLIC BLOOD PRESSURE: 130 MMHG | HEART RATE: 57 BPM | OXYGEN SATURATION: 96 % | DIASTOLIC BLOOD PRESSURE: 79 MMHG

## 2025-03-25 DIAGNOSIS — B59 PNEUMONIA OF BOTH UPPER LOBES DUE TO PNEUMOCYSTIS JIROVECII (H): ICD-10-CM

## 2025-03-25 DIAGNOSIS — R06.09 DYSPNEA ON EXERTION: Primary | ICD-10-CM

## 2025-03-25 PROCEDURE — G0463 HOSPITAL OUTPT CLINIC VISIT: HCPCS | Performed by: INTERNAL MEDICINE

## 2025-03-25 PROCEDURE — 3078F DIAST BP <80 MM HG: CPT | Performed by: INTERNAL MEDICINE

## 2025-03-25 PROCEDURE — 99213 OFFICE O/P EST LOW 20 MIN: CPT | Mod: GC | Performed by: INTERNAL MEDICINE

## 2025-03-25 PROCEDURE — 71250 CT THORAX DX C-: CPT | Performed by: RADIOLOGY

## 2025-03-25 PROCEDURE — 3075F SYST BP GE 130 - 139MM HG: CPT | Performed by: INTERNAL MEDICINE

## 2025-03-25 PROCEDURE — 1126F AMNT PAIN NOTED NONE PRSNT: CPT | Performed by: INTERNAL MEDICINE

## 2025-03-25 ASSESSMENT — PAIN SCALES - GENERAL: PAINLEVEL_OUTOF10: NO PAIN (0)

## 2025-03-25 NOTE — LETTER
3/25/2025      Nik Nava  [id number] [facility] [id number]      Dear Colleague,    Thank you for referring your patient, Nik Nava, to the [facility] FOR LUNG SCIENCE AND [facility] [place]. Please see a copy of my visit note below.    Reason for Visit  Nik Nava is a 64 year old year old male who is being seen for return pulm BMT  Pulmonary HPI from 3/25/2024:  Mr. Nava is overall feeling well. He had H1N1 influenza in December followed by adenovirus so he was sick through New Year's Day. He is still on prednisone taper prescribed by Dr. Martinez. He can tolerate walking a few blocks. He hasn't been doing structured exercise.   No wheezing, coughing, dyspnea on exertion. Denies vocal changes or a choking sensation. He frequently chokes on his own saliva but daughter also has this issue. He is tolerating CPAP well and has not needed a nap since using it the past week.    Granddaughter wants to go to [employer] tomorrow and wife is concerned about exposure to birds triggering lung symptoms.  He has quit smoking since 2019. He chews 12-15 pieces of nicorette gum per day.    he patient was seen and examined by Murtaza Garcia MD along with the resident Dr. Catherine Davis DO.    Pulmonary HPI from 12/10/2024:  63 YO with ALL s/p PBSCT in 8-21 (Day + 261) referred to the Pulmonary clinic by Dr. Hill for an evaluation of an abnormal chest CT.  Denies previous history of pulmonary disease. At present no cough, SOB or sputum production.  No fevers or chills.  Diagnosed with cGvHD of eyes, skin, GI tract and liver.  Was placed on high dose steroids (3-1-22) at 100 mg per day; since that time has been tapering down dose, currently on 50 mg every other day. Denies known mold exposure but did work in the past in a furniture delivery business, worked in a warehouse.  Not currently working.  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  Denies mold exposure at home.  Has cabin but has  not been there this Winter.  No tobacco x 3 years, smoked since age 17, few packs per day.  FH- lung cancer in mother.     Last seen over two years ago.  Referred back to the clinic for a recent abnormal chest CT.  Developed fevers on 10-14 to 101.5 with associated chills, fatigue and decreased appetite.  Was up at cabin removing docks the day prior to the onset of fevers. Denies rash, no cough or sputum production, no GI symptoms, no sinus drainage or symptoms.  Denies any obvious mold exposured did have mold in garage but this was professionally remediated and he was not in the area when the work was done; wore a N95 mask when was in the garage two weeks ago.  Removed docks but denies being in the water or exposure to vegetation, bird droppings or animal nests; no exposure to mice or mouse droppings.  All infectious work-up has been negative including Fungitell, Aspergillus galactomannan, Legionella and Strept antigen.  Was treated with a course of doxycycline without improvement.          Current Outpatient Medications   Medication Sig Dispense Refill     acyclovir (ZOVIRAX) 400 MG tablet Take 800 mg by mouth every 12 hours. Taking 800 mg BID       albuterol (PROAIR HFA/PROVENTIL HFA/VENTOLIN HFA) 108 (90 Base) MCG/ACT inhaler Inhale 2 puffs into the lungs every 6 hours as needed for shortness of breath, wheezing or cough. 18 g 0     dexAMETHasone alcohol-free (DECADRON) 0.1 MG/ML solution Swish and spit 10 mLs (1 mg) in mouth 2 times daily. (Patient taking differently: Swish and spit 1 mg in mouth 2 times daily as needed.) 500 mL 2     escitalopram (LEXAPRO) 10 MG tablet Take 1 tablet (10 mg) by mouth daily 30 tablet 3     levothyroxine (SYNTHROID/LEVOTHROID) 112 MCG tablet Take 1 tablet (112 mcg) by mouth daily. 90 tablet 4     LORazepam (ATIVAN) 1 MG tablet Take 1 tablet (1 mg) by mouth every 6 hours as needed for anxiety. PRN 20 tablet 0     metroNIDAZOLE (METROCREAM) 0.75 % external cream Apply topically 2  times daily Apply to the nose. (Patient taking differently: Apply topically 2 times daily as needed. Apply to the nose.) 45 g 3     nicotine polacrilex (NICORETTE) 4 MG gum Place 4 mg inside cheek daily.       Nutritional Supplements (ENSURE COMPLETE SHAKE) LIQD Take 11 mLs by mouth every morning       omeprazole (PRILOSEC) 40 MG DR capsule Take 1 capsule (40 mg) by mouth daily 30 capsule 3     PANTOTHENIC ACID PO Take 100 mg by mouth daily.       penicillin V (VEETID) 250 MG tablet Take 1 tablet (250 mg) by mouth 2 times daily. 60 tablet 2     polyethylene glycol (MIRALAX) 17 GM/Dose powder Take by mouth daily as needed.       predniSONE (DELTASONE) 10 MG tablet Take 1 tablet (10 mg) by mouth daily. Alternates 10 and 15 mg every other day       predniSONE (DELTASONE) 2.5 MG tablet Take 1 tablet (2.5 mg) by mouth daily. 30 tablet 1     Riboflavin 100 MG TABS Take by mouth.       rosuvastatin (CRESTOR) 5 MG tablet Take 1 tablet (5 mg) by mouth daily. 30 tablet 3     sennosides (SENOKOT) 8.6 MG tablet Take 1 tablet by mouth 3 times daily as needed for constipation 90 tablet 0     sulfamethoxazole-trimethoprim (BACTRIM DS) 800-160 MG tablet Take 1 tablet by mouth Every Mon, Wed, Fri Morning.       tacrolimus (PROTOPIC) 0.1 % external ointment Apply topically 2 times daily (Patient taking differently: Apply topically 2 times daily as needed.) 30 g 1     predniSONE (DELTASONE) 5 MG tablet Use as directed for a prednisone taper (Patient not taking: Reported on 3/25/2025) 50 tablet 2     No current facility-administered medications for this visit.     No Known Allergies    Past Medical History:   Diagnosis Date     ALL (acute lymphocytic leukemia) (H)      CKD (chronic kidney disease)      GVHD (graft versus host disease) (H)      H/O peripheral stem cell transplant (H)      Hyperthyroidism 1996     Infection due to 2019 novel coronavirus 01/16/2023     Infection due to 2019 novel coronavirus 05/18/2022     Influenza A  11/2022     Postablative hypothyroidism 1997     Pulmonary embolism (H)      Renal cell carcinoma (H) 2007    right kidney     Sleep apnea        Past Surgical History:   Procedure Laterality Date     APPENDECTOMY       BACK SURGERY  2017     BONE MARROW BIOPSY, BONE SPECIMEN, NEEDLE/TROCAR Left 09/02/2021    Procedure: BIOPSY, BONE MARROW;  Surgeon: Jailyn Ny APRN CNP;  Location: [facility] OR     BONE MARROW BIOPSY, BONE SPECIMEN, NEEDLE/TROCAR Left 11/15/2021    Procedure: BIOPSY, BONE MARROW;  Surgeon: Socrates De La Torre;  Location: [facility] OR     BONE MARROW BIOPSY, BONE SPECIMEN, NEEDLE/TROCAR Left 02/07/2022    Procedure: BIOPSY, BONE MARROW;  Surgeon: Renetta Wiggins PA-C;  Location: [facility] OR     BONE MARROW BIOPSY, BONE SPECIMEN, NEEDLE/TROCAR Left 08/18/2022    Procedure: BIOPSY, BONE MARROW;  Surgeon: Lorrie Yap PA-C;  Location: [facility] OR     BONE MARROW BIOPSY, BONE SPECIMEN, NEEDLE/TROCAR Left 08/07/2023    Procedure: Bone marrow biopsy, bone specimen, needle/trocar;  Surgeon: Teressa Rick PA-C;  Location: [facility] OR     BRONCHOSCOPY (RIGID OR FLEXIBLE), DIAGNOSTIC N/A 04/29/2022    Procedure: BRONCHOSCOPY, WITH BRONCHOALVEOLAR LAVAGE;  Surgeon: Murtaza Garcia MD;  Location: [facility] GI     BRONCHOSCOPY (RIGID OR FLEXIBLE), DIAGNOSTIC N/A 11/1/2024    Procedure: BRONCHOSCOPY, WITH BRONCHOALVEOLAR LAVAGE;  Surgeon: Murtaza Garcia MD;  Location: [facility] GI     IR CVC TUNNEL PLACEMENT > 5 YRS OF AGE  08/04/2021     IR CVC TUNNEL REMOVAL LEFT  09/02/2021     IR LIVER BIOPSY PERCUTANEOUS  02/21/2022     IR LUMBAR PUNCTURE  1/21/2021     NEPHRECTOMY  2007     ORTHOPEDIC SURGERY      bilateral shoulder surgeries     PERCUTANEOUS BIOPSY LIVER N/A 02/21/2022    Procedure: NEEDLE BIOPSY, LIVER, PERCUTANEOUS;  Surgeon: Trace Castellanos MD;  Location: [facility] OR     PHACOEMULSIFICATION WITH STANDARD INTRAOCULAR LENS IMPLANT Left 1/15/2024    Procedure: LEFT EYE PHACOEMULSIFICATION, CATARACT, WITH  STANDARD INTRAOCULAR LENS IMPLANT INSERTION;  Surgeon: Deshawn Menezes MD;  Location: [facility] OR     PHACOEMULSIFICATION WITH STANDARD INTRAOCULAR LENS IMPLANT Right 2024    Procedure: RIGHT EYE PHACOEMULSIFICATION, CATARACT, WITH STANDARD INTRAOCULAR LENS IMPLANT INSERTION;  Surgeon: Deshawn Menezes MD;  Location: [facility] OR       Social History     Socioeconomic History     Marital status:      Spouse name: Not on file     Number of children: Not on file     Years of education: Not on file     Highest education level: Not on file   Occupational History     Not on file   Tobacco Use     Smoking status: Former     Current packs/day: 0.00     Average packs/day: 2.0 packs/day for 30.0 years (60.0 ttl pk-yrs)     Types: Cigarettes     Start date: 1989     Quit date: 2019     Years since quittin.8     Passive exposure: Past     Smokeless tobacco: Never     Tobacco comments:     DAILY USE OF NICORETTE GUM   Vaping Use     Vaping status: Never Used   Substance and Sexual Activity     Alcohol use: Not Currently     Drug use: Never     Sexual activity: Not on file   Other Topics Concern     Parent/sibling w/ CABG, MI or angioplasty before 65F 55M? Not Asked   Social History Narrative     Not on file     Social Drivers of Health     Financial Resource Strain: Low Risk  (2024)    Financial Resource Strain      Within the past 12 months, have you or your family members you live with been unable to get utilities (heat, electricity) when it was really needed?: No   Food Insecurity: Low Risk  (2024)    Food Insecurity      Within the past 12 months, did you worry that your food would run out before you got money to buy more?: No      Within the past 12 months, did the food you bought just not last and you didn t have money to get more?: No   Transportation Needs: Low Risk  (2024)    Transportation Needs      Within the past 12 months, has lack of transportation kept you from medical  appointments, getting your medicines, non-medical meetings or appointments, work, or from getting things that you need?: No   Physical Activity: Not on file   Stress: Not on file   Social Connections: Socially Integrated (5/30/2024)    Received from [facility]    Social Connections      Do you often feel lonely or isolated from those around you?: 0   Interpersonal Safety: Low Risk  (12/18/2024)    Interpersonal Safety      Do you feel physically and emotionally safe where you currently live?: Yes      Within the past 12 months, have you been hit, slapped, kicked or otherwise physically hurt by someone?: No      Within the past 12 months, have you been humiliated or emotionally abused in other ways by your partner or ex-partner?: No   Housing Stability: High Risk (12/18/2024)    Housing Stability      Do you have housing? : No      Are you worried about losing your housing?: No       ROS Pulmonary  A complete ROS was otherwise negative except as noted in the HPI.  /79   Pulse 57   SpO2 96%   Exam:   GENERAL APPEARANCE: Well developed, well nourished, alert, and in no apparent distress.  EYES: PERRL, EOMI  RESP:  Good air flow throughout.  No crackles. No rhonchi. No wheezes.  CV: Normal S1, S2, regular rhythm, normal rate. No murmur.  No rub. No gallop. No LE edema.   MS: extremities normal. No clubbing. No cyanosis.  NEURO: Mentation intact, speech normal, normal strength and tone, normal gait and stance  PSYCH: mentation appears normal. and affect normal/bright  Results:  Imaging:  CT chest without contrast (3/25/2025)     INDICATION: PJP, GGO 3 follow-up.     COMPARISON: CT chest 12/18/2024     FINDINGS: No contrast. Thyroid not well seen. Right-sided approach  port catheter tip at the SVC/right atrial junction. Multivessel  coronary calcifications. Heart size normal. Thoracic aorta and  pulmonary artery calibers are normal. No pleural or pericardial  effusion. No enlarged  lymph nodes in the chest.  Right nephrectomy. Abdominal aortic and right renal artery stump  atherosclerotic calcification. Splenic artery calcification. Mild  thoracic aortic calcification.  Bone detail shows mild degenerative spurring in the mid to lower  thoracic spine. Old right rib trauma.     Detail of the lungs shows mild biapical scarring. The previous patchy  groundglass opacities in the right upper lobe has nearly completely  resolved. There is still a small residual groundglass opacity (4/106)  in the area of a more dense an larger previous groundglass opacity. No  suspicious solid nodules.                                                                    IMPRESSION: Near complete resolution of previous groundglass opacities  in the right upper lobe. Atherosclerosis.      Assessment and plan:   63 YO with ALL s/p BMT complicated by cGvHD requiring high dose steroids. CT  scan today clear of infiltrates and ground glass opacities compared to image from 12/2025, so low concern for interstitial lung disease. On steroid taper for GVHD. He is tolerating daily activities with no UREÑA, wheezing, coughing. Up to date on COVID19 and flu vaccines. No immediate concerns from a pulmonary standpoint. IgG level being followed by Dr. Cote.      Advised patient to follow up if he has concerns.     RTC as needed if symptoms recur.  Patient advised to contact the clinic with any questions or concerns prior to his next clinic visit    Attending note:  Patient seen, examined and discussed with Dr. Davis.  All data reviewed.  Agree with the assessment and plan as outlined in the above note.  Overall is doing well.  CT chest has completely normalized.  Will see back in clinic if respiratory symptoms recur.    Erica Garcia MD            Again, thank you for allowing me to participate in the care of your patient.        Sincerely,        Murtaza Garcia MD    Electronically signed

## 2025-03-25 NOTE — PROGRESS NOTES
Reason for Visit  Nik Nava is a 64 year old year old male who is being seen for return pulm BMT  Pulmonary HPI from 3/25/2024:  Mr. Nava is overall feeling well. He had H1N1 influenza in December followed by adenovirus so he was sick through New Year's Day. He is still on prednisone taper prescribed by Dr. Martinez. He can tolerate walking a few blocks. He hasn't been doing structured exercise.   No wheezing, coughing, dyspnea on exertion. Denies vocal changes or a choking sensation. He frequently chokes on his own saliva but daughter also has this issue. He is tolerating CPAP well and has not needed a nap since using it the past week.    Granddaughter wants to go to [employer] tomorrow and wife is concerned about exposure to birds triggering lung symptoms.  He has quit smoking since 2019. He chews 12-15 pieces of nicorette gum per day.    he patient was seen and examined by Murtaza Garcia MD along with the resident Dr. Catherine Davis DO.    Pulmonary HPI from 12/10/2024:  63 YO with ALL s/p PBSCT in 8-21 (Day + 261) referred to the Pulmonary clinic by Dr. Hill for an evaluation of an abnormal chest CT.  Denies previous history of pulmonary disease. At present no cough, SOB or sputum production.  No fevers or chills.  Diagnosed with cGvHD of eyes, skin, GI tract and liver.  Was placed on high dose steroids (3-1-22) at 100 mg per day; since that time has been tapering down dose, currently on 50 mg every other day. Denies known mold exposure but did work in the past in a furniture delivery business, worked in a warehouse.  Not currently working.  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  Denies mold exposure at home.  Has cabin but has not been there this Winter.  No tobacco x 3 years, smoked since age 17, few packs per day.  FH- lung cancer in mother.     Last seen over two years ago.  Referred back to the clinic for a recent abnormal chest CT.  Developed fevers on 10-14 to 101.5 with associated chills,  fatigue and decreased appetite.  Was up at cabin removing docks the day prior to the onset of fevers. Denies rash, no cough or sputum production, no GI symptoms, no sinus drainage or symptoms.  Denies any obvious mold exposured did have mold in garage but this was professionally remediated and he was not in the area when the work was done; wore a N95 mask when was in the garage two weeks ago.  Removed docks but denies being in the water or exposure to vegetation, bird droppings or animal nests; no exposure to mice or mouse droppings.  All infectious work-up has been negative including Fungitell, Aspergillus galactomannan, Legionella and Strept antigen.  Was treated with a course of doxycycline without improvement.          Current Outpatient Medications   Medication Sig Dispense Refill    acyclovir (ZOVIRAX) 400 MG tablet Take 800 mg by mouth every 12 hours. Taking 800 mg BID      albuterol (PROAIR HFA/PROVENTIL HFA/VENTOLIN HFA) 108 (90 Base) MCG/ACT inhaler Inhale 2 puffs into the lungs every 6 hours as needed for shortness of breath, wheezing or cough. 18 g 0    dexAMETHasone alcohol-free (DECADRON) 0.1 MG/ML solution Swish and spit 10 mLs (1 mg) in mouth 2 times daily. (Patient taking differently: Swish and spit 1 mg in mouth 2 times daily as needed.) 500 mL 2    escitalopram (LEXAPRO) 10 MG tablet Take 1 tablet (10 mg) by mouth daily 30 tablet 3    levothyroxine (SYNTHROID/LEVOTHROID) 112 MCG tablet Take 1 tablet (112 mcg) by mouth daily. 90 tablet 4    LORazepam (ATIVAN) 1 MG tablet Take 1 tablet (1 mg) by mouth every 6 hours as needed for anxiety. PRN 20 tablet 0    metroNIDAZOLE (METROCREAM) 0.75 % external cream Apply topically 2 times daily Apply to the nose. (Patient taking differently: Apply topically 2 times daily as needed. Apply to the nose.) 45 g 3    nicotine polacrilex (NICORETTE) 4 MG gum Place 4 mg inside cheek daily.      Nutritional Supplements (ENSURE COMPLETE SHAKE) LIQD Take 11 mLs by mouth  every morning      omeprazole (PRILOSEC) 40 MG DR capsule Take 1 capsule (40 mg) by mouth daily 30 capsule 3    PANTOTHENIC ACID PO Take 100 mg by mouth daily.      penicillin V (VEETID) 250 MG tablet Take 1 tablet (250 mg) by mouth 2 times daily. 60 tablet 2    polyethylene glycol (MIRALAX) 17 GM/Dose powder Take by mouth daily as needed.      predniSONE (DELTASONE) 10 MG tablet Take 1 tablet (10 mg) by mouth daily. Alternates 10 and 15 mg every other day      predniSONE (DELTASONE) 2.5 MG tablet Take 1 tablet (2.5 mg) by mouth daily. 30 tablet 1    Riboflavin 100 MG TABS Take by mouth.      rosuvastatin (CRESTOR) 5 MG tablet Take 1 tablet (5 mg) by mouth daily. 30 tablet 3    sennosides (SENOKOT) 8.6 MG tablet Take 1 tablet by mouth 3 times daily as needed for constipation 90 tablet 0    sulfamethoxazole-trimethoprim (BACTRIM DS) 800-160 MG tablet Take 1 tablet by mouth Every Mon, Wed, Fri Morning.      tacrolimus (PROTOPIC) 0.1 % external ointment Apply topically 2 times daily (Patient taking differently: Apply topically 2 times daily as needed.) 30 g 1    predniSONE (DELTASONE) 5 MG tablet Use as directed for a prednisone taper (Patient not taking: Reported on 3/25/2025) 50 tablet 2     No current facility-administered medications for this visit.     No Known Allergies    Past Medical History:   Diagnosis Date    ALL (acute lymphocytic leukemia) (H)     CKD (chronic kidney disease)     GVHD (graft versus host disease) (H)     H/O peripheral stem cell transplant (H)     Hyperthyroidism 1996    Infection due to 2019 novel coronavirus 01/16/2023    Infection due to 2019 novel coronavirus 05/18/2022    Influenza A 11/2022    Postablative hypothyroidism 1997    Pulmonary embolism (H)     Renal cell carcinoma (H) 2007    right kidney    Sleep apnea        Past Surgical History:   Procedure Laterality Date    APPENDECTOMY      BACK SURGERY  2017    BONE MARROW BIOPSY, BONE SPECIMEN, NEEDLE/TROCAR Left 09/02/2021     Procedure: BIOPSY, BONE MARROW;  Surgeon: Jailyn Ny APRN CNP;  Location: [facility] OR    BONE MARROW BIOPSY, BONE SPECIMEN, NEEDLE/TROCAR Left 11/15/2021    Procedure: BIOPSY, BONE MARROW;  Surgeon: Socrates De La Torre;  Location: [facility] OR    BONE MARROW BIOPSY, BONE SPECIMEN, NEEDLE/TROCAR Left 02/07/2022    Procedure: BIOPSY, BONE MARROW;  Surgeon: Renetta Wiggins PA-C;  Location: [facility] OR    BONE MARROW BIOPSY, BONE SPECIMEN, NEEDLE/TROCAR Left 08/18/2022    Procedure: BIOPSY, BONE MARROW;  Surgeon: Lorrie Yap PA-C;  Location: [facility] OR    BONE MARROW BIOPSY, BONE SPECIMEN, NEEDLE/TROCAR Left 08/07/2023    Procedure: Bone marrow biopsy, bone specimen, needle/trocar;  Surgeon: Teressa Rick PA-C;  Location: [facility] OR    BRONCHOSCOPY (RIGID OR FLEXIBLE), DIAGNOSTIC N/A 04/29/2022    Procedure: BRONCHOSCOPY, WITH BRONCHOALVEOLAR LAVAGE;  Surgeon: Murtaza Garcia MD;  Location: [facility]    BRONCHOSCOPY (RIGID OR FLEXIBLE), DIAGNOSTIC N/A 11/1/2024    Procedure: BRONCHOSCOPY, WITH BRONCHOALVEOLAR LAVAGE;  Surgeon: Murtaza Garcia MD;  Location: [facility]    IR CVC TUNNEL PLACEMENT > 5 YRS OF AGE  08/04/2021    IR CVC TUNNEL REMOVAL LEFT  09/02/2021    IR LIVER BIOPSY PERCUTANEOUS  02/21/2022    IR LUMBAR PUNCTURE  1/21/2021    NEPHRECTOMY  2007    ORTHOPEDIC SURGERY      bilateral shoulder surgeries    PERCUTANEOUS BIOPSY LIVER N/A 02/21/2022    Procedure: NEEDLE BIOPSY, LIVER, PERCUTANEOUS;  Surgeon: Trace Castellanos MD;  Location: [facility] OR    PHACOEMULSIFICATION WITH STANDARD INTRAOCULAR LENS IMPLANT Left 1/15/2024    Procedure: LEFT EYE PHACOEMULSIFICATION, CATARACT, WITH STANDARD INTRAOCULAR LENS IMPLANT INSERTION;  Surgeon: Deshawn Menezes MD;  Location: [facility] OR    PHACOEMULSIFICATION WITH STANDARD INTRAOCULAR LENS IMPLANT Right 2/5/2024    Procedure: RIGHT EYE PHACOEMULSIFICATION, CATARACT, WITH STANDARD INTRAOCULAR LENS IMPLANT INSERTION;  Surgeon: Deshawn Menezes MD;  Location:  [facility] OR       Social History     Socioeconomic History    Marital status:      Spouse name: Not on file    Number of children: Not on file    Years of education: Not on file    Highest education level: Not on file   Occupational History    Not on file   Tobacco Use    Smoking status: Former     Current packs/day: 0.00     Average packs/day: 2.0 packs/day for 30.0 years (60.0 ttl pk-yrs)     Types: Cigarettes     Start date: 1989     Quit date: 2019     Years since quittin.8     Passive exposure: Past    Smokeless tobacco: Never    Tobacco comments:     DAILY USE OF NICORETTE GUM   Vaping Use    Vaping status: Never Used   Substance and Sexual Activity    Alcohol use: Not Currently    Drug use: Never    Sexual activity: Not on file   Other Topics Concern    Parent/sibling w/ CABG, MI or angioplasty before 65F 55M? Not Asked   Social History Narrative    Not on file     Social Drivers of Health     Financial Resource Strain: Low Risk  (2024)    Financial Resource Strain     Within the past 12 months, have you or your family members you live with been unable to get utilities (heat, electricity) when it was really needed?: No   Food Insecurity: Low Risk  (2024)    Food Insecurity     Within the past 12 months, did you worry that your food would run out before you got money to buy more?: No     Within the past 12 months, did the food you bought just not last and you didn t have money to get more?: No   Transportation Needs: Low Risk  (2024)    Transportation Needs     Within the past 12 months, has lack of transportation kept you from medical appointments, getting your medicines, non-medical meetings or appointments, work, or from getting things that you need?: No   Physical Activity: Not on file   Stress: Not on file   Social Connections: Socially Integrated (2024)    [facility]    Social Connections     Do you often feel lonely or  isolated from those around you?: 0   Interpersonal Safety: Low Risk  (12/18/2024)    Interpersonal Safety     Do you feel physically and emotionally safe where you currently live?: Yes     Within the past 12 months, have you been hit, slapped, kicked or otherwise physically hurt by someone?: No     Within the past 12 months, have you been humiliated or emotionally abused in other ways by your partner or ex-partner?: No   Housing Stability: High Risk (12/18/2024)    Housing Stability     Do you have housing? : No     Are you worried about losing your housing?: No       ROS Pulmonary  A complete ROS was otherwise negative except as noted in the HPI.  /79   Pulse 57   SpO2 96%   Exam:   GENERAL APPEARANCE: Well developed, well nourished, alert, and in no apparent distress.  EYES: PERRL, EOMI  RESP:  Good air flow throughout.  No crackles. No rhonchi. No wheezes.  CV: Normal S1, S2, regular rhythm, normal rate. No murmur.  No rub. No gallop. No LE edema.   MS: extremities normal. No clubbing. No cyanosis.  NEURO: Mentation intact, speech normal, normal strength and tone, normal gait and stance  PSYCH: mentation appears normal. and affect normal/bright  Results:  Imaging:  CT chest without contrast (3/25/2025)     INDICATION: PJP, GGO 3 follow-up.     COMPARISON: CT chest 12/18/2024     FINDINGS: No contrast. Thyroid not well seen. Right-sided approach  port catheter tip at the SVC/right atrial junction. Multivessel  coronary calcifications. Heart size normal. Thoracic aorta and  pulmonary artery calibers are normal. No pleural or pericardial  effusion. No enlarged lymph nodes in the chest.  Right nephrectomy. Abdominal aortic and right renal artery stump  atherosclerotic calcification. Splenic artery calcification. Mild  thoracic aortic calcification.  Bone detail shows mild degenerative spurring in the mid to lower  thoracic spine. Old right rib trauma.     Detail of the lungs shows mild biapical scarring. The  previous patchy  groundglass opacities in the right upper lobe has nearly completely  resolved. There is still a small residual groundglass opacity (4/106)  in the area of a more dense an larger previous groundglass opacity. No  suspicious solid nodules.                                                                    IMPRESSION: Near complete resolution of previous groundglass opacities  in the right upper lobe. Atherosclerosis.      Assessment and plan:   63 YO with ALL s/p BMT complicated by cGvHD requiring high dose steroids. CT  scan today clear of infiltrates and ground glass opacities compared to image from 12/2025, so low concern for interstitial lung disease. On steroid taper for GVHD. He is tolerating daily activities with no UREÑA, wheezing, coughing. Up to date on COVID19 and flu vaccines. No immediate concerns from a pulmonary standpoint. IgG level being followed by Dr. Cote.      Advised patient to follow up if he has concerns.     RTC as needed if symptoms recur.  Patient advised to contact the clinic with any questions or concerns prior to his next clinic visit    Attending note:  Patient seen, examined and discussed with Dr. Davis.  All data reviewed.  Agree with the assessment and plan as outlined in the above note.  Overall is doing well.  CT chest has completely normalized.  Will see back in clinic if respiratory symptoms recur.    Erica Garcia MD

## 2025-03-25 NOTE — NURSING NOTE
Chief Complaint   Patient presents with    return pulm BMT     Medications reviewed and vital signs taken.   Hi Anthony, EMT

## 2025-03-31 NOTE — PROGRESS NOTES
Virtual Visit Details    Type of service:  Video Visit   Video Start Time: 12:57 PM  Video End Time:1:25 PM    Originating Location (pt. Location): Home    Distant Location (provider location):  Off-site  Platform used for Video Visit: [facility] [address] [address]  Phone: [phone]  Fax: [phone]    Patient:  Nik Nava, Date of birth 1960  Date of Visit:  03/31/2025  Referring Provider Alba Markham MD  Reason for visit: fever of unknown origin    Recommendations   PJP Pneumonia  Continue TMP/SMX DS MWF. Will continue this for at least another 3-6 months to ensure he does not need to increase his immunosuppression again. Would also check his CD4+ prior to discontinuation.   CT chest from 3/25 with near complete resolution of previous GGOs. No follow-up CT needed.     Functional Asplenia  Continue PCN V for PPx  Up-to-date on pneumococcal and HIB vaccines   Completed primary meningitis series. Continued vaccination as follows:   Menveo (MenACWY): Completed 2/2 doses of primary series in 2/2025. Should get boosters every 5 years  Bexsero (MenB): Completed 2/2 doses in 2/2025. Should get boosters at 1 year and every 2-3 years thereafter    Other  Continue Valtrex PPx  Vaccines  Up-to-date on COVID-19 booster (last 3/2025)  Will plan to get MMR series once tapered down on his steroids a bit more. We discussed safety with travel and avoiding traveling to areas where there are high rates of measles transmission.     Follow-up in 4-5 months     I spent 42 minutes reviewing the patient, reviewing the medical record, and interpreting patient findings, and coordinating care     The longitudinal plan of care for the PJP Pneumonia, functional asplenia, and GVHD as documented were addressed during this visit. Due to the added complexity in care, I will continue to support Nik in the subsequent management and with  ongoing continuity of care.    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 He is is currently on 15 mg/day prednisone and dex swish and swallow. We are seeing him in clinic today for unexplained fevers and pulmonary GGOs.     # PJP pneumonia   # Chronic steroid use (15 mg/kg/day)   Recently experienced unexplained fevers from 10/24-11/5 with CT chest (10/21) with peribronchovascular GGOs. BAL from 11/1 positive for PJP. Started on TMP/SMX at 15 mg/kg/day on 11/18/24. Developed nausea, vomiting, and hyperkalemia on 11/25/24. Transitioned to atovaquone on 11/25. Will plan for 21 day course of treatment (Dates: 11/18-12/10). He can transition to TMP/SMX prophylaxis indefinitely thereafter.     #Functional asplenia  On PCN V PPx  Completed initial series for MenABWY and MenB.   Up-to-date on PCV-20 and HIB    Transplant Checklist  - QTc interval: 446 (11/25/24)  - Pneumocystis prophylaxis: TMP/SMX MWF. Previously on pentamidine, but stopped in 6/2023 when CD4>200. PJP PCR from Bal positive on 11/1/2024. Started on TMP/SMX at 15 mg/kg/day on 11/18/24. Transitioned to atovaquone on 11/25/24 due to hyperkalemia. Transitioned to TMP/SMX DS on 12/11/2024.   - Bacterial prophylaxis: Penicillin for asplenia   - Fungal prophylaxis: None.   - Serostatus & viral prophylaxis: CMV D+/R+, HSV ?, EBV + Acyclovir. Developed recurrent oral HSV after stopping acyclovir.   - Immunization status:  Up-to-date on seasonal COVID-19 (last on 3/2025) and influenza, Shingrix, RSV (8/2024), PCV-20 (5/2024), HBV, and Tetanus (5/2024).   Has gotten 2 doses of MenACWY and MenB (last 2/2025). Will need 1 year booster of MenB in 2/2026.   - Gamma globulin status:   Recent Labs   Lab Test 03/12/25  1010   *     - Antibiotic allergies: Rash with bactrim. Tolerated TMP/SMX  OK in 11/2024 so this was removed as an allergy.      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 Bone biopsy from 3/31/23 without culture growth. No cellular material present. Treated with 8 weeks IV CTX (end 5/22/2023). Re=admitted 8/2023 with increasing back pain and progression of discitis/osteomyelitis. Treated with vancomycin and ertapenem for 8 weeks. Transitioned to PO clindamycin-->doxycycline and Augmentin for an additional 4 weeks. Repeat spinal imaging on 11/29 with significant improvement.    COVID-19 x2: Diagnosed last in 1/2023. Treated with Paxlovid  CMV Viremia: Diagnosed in 4/2022. Treated with Valcyte 900 mg BID.   Oral Herpes : Initialy diagnosed in 12/2023. Treated with Valtrex with subsequent recurrence. Has been on acyclvoir PPx since 1/20/2024 without recurrence.   RSV PNA (3/26/2024): S/p ribavirin   Interval Events   Since seen by me in clinic on 12/10/2024, he has been doing well. Completed his course of atovaquone in December and transitioned back to TMP/SMX PPx. Seen by Dr. Garcia on 3/25 who had no immediate concerns and recommended him follow-up PRN. Seen by Dr. Cote on 3/12. Did try starting him on Jakafi in December, but this seemed to cause more viruses (influenza, adenovirus) and low heart rate. Ultimately decided to discontinue this and slowly wean prednisone to see what happens. Had tapered prednisone down to 10 mg/day back in February. Goal to taper down to 7.5 mg/day in May.  Will work with endocrine to ensure no mineralocorticoid issues.     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 Course c/b recurrent oral HSV as well as skin, ocular, oral, and liver cGVHD. He  is is currently on 15 mg/day prednisone and dex swish and swallow. We are seeing him in clinic today for unexplained fevers and pulmonary GGOs.     Elevated temp starting on 10/14. Was 101.8 on 10/24. Had fatigue, muscle aches, and headaches. Bronchoscopy 11/01. Since 11/5 his temp has been normal.  Feels well except for fatigue and SOB with exertion since the bronchoscopy. No cough, sore throat, nausea, vomiting, diarrhea. No recent sick contacts. He completed a 7d course of doxycycline (10/18 started). Has been on posaconazole since 11/2.     Fever history is as follows:   10/24/24: Developed fevers to 101 with early morning chills and shivering. No other localizing symptoms.   10/20/24: Started doxyclcine for possible tick-borne illness, given extensive exposure history  10/21/24: CT chest with peribronchovasciular GGOs in apical and superior segments of bilateral lower lobes,   10/25/2024: Seen by Dr. Cote and still noted fevers. CT chest with bilateral GGOs.   11/1/2024: BAL completed with NGTD.     Of note, he was on tacrolimus for his GVHD from 8/2021-9/2022. Switched to sirolimus from 9-10/2022. Has not been on either since. For his prednisone, he has been on steroids since 3/2022. Has been on 15 mg/day since 9/9/2024 (was on 10 previously).       Exposure History   -[place] 2024  -Cabin near [facility]-- demo'd the garage and there was mold found, but he stayed outside of the garage during this time and wore N95  -Hasn't been in water  -No travel to  [place]  -[place], [place], [place] in 2019  -No pets at home. Got scratches from neighbor's cat this past summer and daughter's dog.  -No time in  or prisons.     Vaccines     Immunization History   Administered Date(s) Administered    COVID-19 12+ (MODERNA) 09/24/2024, 03/20/2025    COVID-19 12+ (Pfizer) 11/14/2023    COVID-19 Bivalent 12+ (Pfizer) 11/16/2022    COVID-19 MONOVALENT 12+ (Pfizer) 11/18/2021, 12/14/2021     DTAP,IPV,HIB,HEPB (Vaxelis) 05/09/2024    DTaP/HepB/IPV 11/14/2023    Flu 65+ (Fluad) 09/24/2024    HIB (PRP-T) 11/14/2023    Influenza Vaccine 18-64 (Flublok) 10/12/2021    Influenza Vaccine 65+ (FLUAD) 09/08/2023    Influenza,INJ,MDCK,PF,Quad >6mo(Flucelvax) 11/16/2022    Meningococcal ACWY (Menquadfi ) 02/06/2025    Meningococcal ACWY (Menveo ) 11/28/2024    Meningococcal B (Bexsero ) 11/28/2024, 02/11/2025    Pneumo Conj 13-V (2010&after) 11/14/2023    Pneumococcal 20 valent Conjugate (Prevnar 20) 05/09/2024    RSV Vaccine (Arexvy) 08/13/2024    TDAP (Adacel,Boostrix) 10/27/2016    Td (Adult), Adsorbed 07/01/2006    Zoster recombinant adjuvanted (Shingrix) 11/14/2023, 01/09/2024       Medications     Current Outpatient Medications   Medication Sig Dispense Refill    acyclovir (ZOVIRAX) 400 MG tablet Take 800 mg by mouth every 12 hours. Taking 800 mg BID      albuterol (PROAIR HFA/PROVENTIL HFA/VENTOLIN HFA) 108 (90 Base) MCG/ACT inhaler Inhale 2 puffs into the lungs every 6 hours as needed for shortness of breath, wheezing or cough. 18 g 0    dexAMETHasone alcohol-free (DECADRON) 0.1 MG/ML solution Swish and spit 10 mLs (1 mg) in mouth 2 times daily. (Patient taking differently: Swish and spit 1 mg in mouth 2 times daily as needed.) 500 mL 2    escitalopram (LEXAPRO) 10 MG tablet Take 1 tablet (10 mg) by mouth daily 30 tablet 3    levothyroxine (SYNTHROID/LEVOTHROID) 112 MCG tablet Take 1 tablet (112 mcg) by mouth daily. 90 tablet 4    LORazepam (ATIVAN) 1 MG tablet Take 1 tablet (1 mg) by mouth every 6 hours as needed for anxiety. PRN 20 tablet 0    metroNIDAZOLE (METROCREAM) 0.75 % external cream Apply topically 2 times daily Apply to the nose. (Patient taking differently: Apply topically 2 times daily as needed. Apply to the nose.) 45 g 3    nicotine polacrilex (NICORETTE) 4 MG gum Place 4 mg inside cheek daily.      Nutritional Supplements (ENSURE COMPLETE SHAKE) LIQD Take 11 mLs by mouth every morning       omeprazole (PRILOSEC) 40 MG DR capsule Take 1 capsule (40 mg) by mouth daily 30 capsule 3    PANTOTHENIC ACID PO Take 100 mg by mouth daily.      penicillin V (VEETID) 250 MG tablet Take 1 tablet (250 mg) by mouth 2 times daily. 60 tablet 2    polyethylene glycol (MIRALAX) 17 GM/Dose powder Take by mouth daily as needed.      predniSONE (DELTASONE) 10 MG tablet Take 1 tablet (10 mg) by mouth daily. Alternates 10 and 15 mg every other day      predniSONE (DELTASONE) 2.5 MG tablet Take 1 tablet (2.5 mg) by mouth daily. 30 tablet 1    predniSONE (DELTASONE) 5 MG tablet Use as directed for a prednisone taper (Patient not taking: Reported on 3/25/2025) 50 tablet 2    Riboflavin 100 MG TABS Take by mouth.      rosuvastatin (CRESTOR) 5 MG tablet Take 1 tablet (5 mg) by mouth daily. 30 tablet 3    sennosides (SENOKOT) 8.6 MG tablet Take 1 tablet by mouth 3 times daily as needed for constipation 90 tablet 0    sulfamethoxazole-trimethoprim (BACTRIM DS) 800-160 MG tablet Take 1 tablet by mouth Every Mon, Wed, Fri Morning.      tacrolimus (PROTOPIC) 0.1 % external ointment Apply topically 2 times daily (Patient taking differently: Apply topically 2 times daily as needed.) 30 g 1     No current facility-administered medications for this visit.          Physical Exam     Physical Exam     Vital signs:  There were no vitals taken for this visit.    GENERAL: alert and no distress. Sitting up in chair and talking with wife.   RESP: No increased work of breathing or respiratory distress.   CV: No evidence of cyanosis.   Skin: NO skin rashes/lesions     Data     Data   Laboratory data and imaging listed below was reviewed prior to this encounter.   CT chest (10/21/2024) with patchy peribronchovascular GGOs with apical predominance. BAL (11/1/2024) without growth to date and negative BAL galactomannan (0.06). Blood cutlure (10/15/21) NGTD. RVP/influenza/SARS CoV-2 (10/15/24), Lyme antibody (10/18/24), CrAG (10/18/24) all negative. CMV  VL negative (10/25) and EBV VL <35 (10/25/24). Nocardia (11/1/24) negative. Fungal/yeast culture (11/1/24) negative. Leukocytosis with elevated neutrophils 10/18/24 and 10/25/24.          ----- Service Performed and Documented by Resident or Fellow ------

## 2025-04-01 ENCOUNTER — VIRTUAL VISIT (OUTPATIENT)
Dept: INFECTIOUS DISEASES | Facility: CLINIC | Age: 65
End: 2025-04-01
Attending: INTERNAL MEDICINE
Payer: MEDICARE

## 2025-04-01 VITALS
DIASTOLIC BLOOD PRESSURE: 71 MMHG | BODY MASS INDEX: 34.46 KG/M2 | WEIGHT: 260 LBS | SYSTOLIC BLOOD PRESSURE: 121 MMHG | TEMPERATURE: 98.6 F | HEART RATE: 75 BPM | HEIGHT: 73 IN

## 2025-04-01 DIAGNOSIS — T86.09 GVHD AS COMPLICATION OF BONE MARROW TRANSPLANT (H): ICD-10-CM

## 2025-04-01 DIAGNOSIS — D89.813 GVHD AS COMPLICATION OF BONE MARROW TRANSPLANT (H): ICD-10-CM

## 2025-04-01 DIAGNOSIS — B59 PNEUMONIA OF BOTH UPPER LOBES DUE TO PNEUMOCYSTIS JIROVECII (H): ICD-10-CM

## 2025-04-01 DIAGNOSIS — C91.01 ACUTE LYMPHOBLASTIC LEUKEMIA (ALL) IN REMISSION (H): Primary | ICD-10-CM

## 2025-04-01 ASSESSMENT — PAIN SCALES - GENERAL: PAINLEVEL_OUTOF10: NO PAIN (0)

## 2025-04-01 NOTE — PATIENT INSTRUCTIONS
It was great to see you in clinic today! We discussed the following:     Continue taking the Bactrim three times weekly. We will continue this for the next 3-6 months to ensure that your steroid doses remain low. If you remain on a steroid dose of <10 mg/day we will then check your CD4 count to ensure this is >200 before stopping your Bactrim.   Continue your penicillin to prevent invasive pneumococcal pneumonia  For your meningitis shots, we will continue the vaccine series as follows:   Menactra (MenACWY): Booster every 5 years  Bexero (MenB): Booster at 1 year and every 2-3 years after this   You are currently up-to-date on all your other vaccines  For safety recommendations to prevent infections, please do the following:   Avoid travel to high measles settings  If you are traveling in a plane, please wear an N95 mask in the airport and airplane. Try not to take this off.   If you are digging in the soil, you can wear a KN95 mask or one of those valve masks. You can also purchase and wear a respirator to prevent you from getting a fungal pneumonia.     Follow-up with me in ~4 months to review need for ongoing Bactrim

## 2025-04-01 NOTE — LETTER
4/1/2025       RE: Nik Nava  [address] [facility] [address]     Dear Colleague,    Thank you for referring your patient, Nik Nava, to the [facility] [place] at [facility]. Please see a copy of my visit note below.    Virtual Visit Details    Type of service:  Video Visit   Video Start Time: 12:57 PM  Video End Time:1:25 PM    Originating Location (pt. Location): Home    Distant Location (provider location):  Off-site  Platform used for Video Visit: [facility] [place]  [address] [facility] [address]  Phone: [phone]  Fax: [phone]    Patient:  Nik Nava, Date of birth 1960  Date of Visit:  03/31/2025  Referring Provider Alba Markham MD  Reason for visit: fever of unknown origin    Recommendations   PJP Pneumonia  Continue TMP/SMX DS MWF. Will continue this for at least another 3-6 months to ensure he does not need to increase his immunosuppression again. Would also check his CD4+ prior to discontinuation.   CT chest from 3/25 with near complete resolution of previous GGOs. No follow-up CT needed.     Functional Asplenia  Continue PCN V for PPx  Up-to-date on pneumococcal and HIB vaccines   Completed primary meningitis series. Continued vaccination as follows:   Menveo (MenACWY): Completed 2/2 doses of primary series in 2/2025. Should get boosters every 5 years  Bexsero (MenB): Completed 2/2 doses in 2/2025. Should get boosters at 1 year and every 2-3 years thereafter    Other  Continue Valtrex PPx  Vaccines  Up-to-date on COVID-19 booster (last 3/2025)  Will plan to get MMR series once tapered down on his steroids a bit more. We discussed safety with travel and avoiding traveling to areas where there are high rates of measles transmission.     Follow-up in 4-5 months     I spent 42 minutes reviewing the patient, reviewing  the medical record, and interpreting patient findings, and coordinating care     The longitudinal plan of care for the PJP Pneumonia, functional asplenia, and GVHD as documented were addressed during this visit. Due to the added complexity in care, I will continue to support Nik in the subsequent management and with ongoing continuity of care.    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 He is is currently on 15 mg/day prednisone and dex swish and swallow. We are seeing him in clinic today for unexplained fevers and pulmonary GGOs.     # PJP pneumonia   # Chronic steroid use (15 mg/kg/day)   Recently experienced unexplained fevers from 10/24-11/5 with CT chest (10/21) with peribronchovascular GGOs. BAL from 11/1 positive for PJP. Started on TMP/SMX at 15 mg/kg/day on 11/18/24. Developed nausea, vomiting, and hyperkalemia on 11/25/24. Transitioned to atovaquone on 11/25. Will plan for 21 day course of treatment (Dates: 11/18-12/10). He can transition to TMP/SMX prophylaxis indefinitely thereafter.     #Functional asplenia  On PCN V PPx  Completed initial series for MenABWY and MenB.   Up-to-date on PCV-20 and HIB    Transplant Checklist  - QTc interval: 446 (11/25/24)  - Pneumocystis prophylaxis: TMP/SMX MWF. Previously on pentamidine, but stopped in 6/2023 when CD4>200. PJP PCR from Bal positive on 11/1/2024. Started on TMP/SMX at 15 mg/kg/day on 11/18/24. Transitioned to atovaquone on 11/25/24 due to hyperkalemia. Transitioned to TMP/SMX DS on 12/11/2024.   - Bacterial prophylaxis: Penicillin for asplenia   - Fungal prophylaxis: None.   - Serostatus & viral prophylaxis: CMV D+/R+, HSV ?, EBV + Acyclovir. Developed recurrent oral HSV after stopping acyclovir.   - Immunization status:  Up-to-date on seasonal COVID-19 (last on 3/2025) and  influenza, Shingrix, RSV (8/2024), PCV-20 (5/2024), HBV, and Tetanus (5/2024).   Has gotten 2 doses of MenACWY and MenB (last 2/2025). Will need 1 year booster of MenB in 2/2026.   - Gamma globulin status:   Recent Labs   Lab Test 03/12/25  1010   *     - Antibiotic allergies: Rash with bactrim. Tolerated TMP/SMX OK in 11/2024 so this was removed as an allergy.      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 Bone biopsy from 3/31/23 without culture growth. No cellular material present. Treated with 8 weeks IV CTX (end 5/22/2023). Re=admitted 8/2023 with increasing back pain and progression of discitis/osteomyelitis. Treated with vancomycin and ertapenem for 8 weeks. Transitioned to PO clindamycin-->doxycycline and Augmentin for an additional 4 weeks. Repeat spinal imaging on 11/29 with significant improvement.    COVID-19 x2: Diagnosed last in 1/2023. Treated with Paxlovid  CMV Viremia: Diagnosed in 4/2022. Treated with Valcyte 900 mg BID.   Oral Herpes : Initialy diagnosed in 12/2023. Treated with Valtrex with subsequent recurrence. Has been on acyclvoir PPx since 1/20/2024 without recurrence.   RSV PNA (3/26/2024): S/p ribavirin   Interval Events   Since seen by me in clinic on 12/10/2024, he has been doing well. Completed his course of atovaquone in December and transitioned back to TMP/SMX PPx. Seen by Dr. Garcia on 3/25 who had no immediate concerns and recommended him follow-up PRN. Seen by Dr. Cote on 3/12. Did try starting him on Jakafi in December, but this seemed to cause more viruses (influenza, adenovirus) and low heart rate. Ultimately decided to discontinue this and slowly wean prednisone to see what happens. Had tapered prednisone down to 10 mg/day back in February. Goal to taper down to 7.5 mg/day in May.  Will work with endocrine to ensure no mineralocorticoid issues.     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 Course c/b recurrent oral HSV as well as skin, ocular, oral, and liver cGVHD. He is is currently on 15 mg/day prednisone and dex swish and swallow. We are seeing him in clinic today for unexplained fevers and pulmonary GGOs.     Elevated temp starting on 10/14. Was 101.8 on 10/24. Had fatigue, muscle aches, and headaches. Bronchoscopy 11/01. Since 11/5 his temp has been normal.  Feels well except for fatigue and SOB with exertion since the bronchoscopy. No cough, sore throat, nausea, vomiting, diarrhea. No recent sick contacts. He completed a 7d course of doxycycline (10/18 started). Has been on posaconazole since 11/2.     Fever history is as follows:   10/24/24: Developed fevers to 101 with early morning chills and shivering. No other localizing symptoms.   10/20/24: Started doxyclcine for possible tick-borne illness, given extensive exposure history  10/21/24: CT chest with peribronchovasciular GGOs in apical and superior segments of bilateral lower lobes,   10/25/2024: Seen by Dr. Cote and still noted fevers. CT chest with bilateral GGOs.   11/1/2024: BAL completed with NGTD.     Of note, he was on tacrolimus for his GVHD from 8/2021-9/2022. Switched to sirolimus from 9-10/2022. Has not been on either since. For his prednisone, he has been on steroids since 3/2022. Has been on 15 mg/day since 9/9/2024 (was on 10 previously).       Exposure History   -[place] 2024  -Cabin near [facility]-- demo'd the garage and there was mold found, but he stayed outside of the garage during this time and wore N95  -Hasn't been in water  -No travel to  [place]  -[place], [place], [place] in 2019  -No pets at home. Got scratches from neighbor's cat this past summer and  daughter's dog.  -No time in  or prisons.     Vaccines     Immunization History   Administered Date(s) Administered     COVID-19 12+ (MODERNA) 09/24/2024, 03/20/2025     COVID-19 12+ (Pfizer) 11/14/2023     COVID-19 Bivalent 12+ (Pfizer) 11/16/2022     COVID-19 MONOVALENT 12+ (Pfizer) 11/18/2021, 12/14/2021     DTAP,IPV,HIB,HEPB (Vaxelis) 05/09/2024     DTaP/HepB/IPV 11/14/2023     Flu 65+ (Fluad) 09/24/2024     HIB (PRP-T) 11/14/2023     Influenza Vaccine 18-64 (Flublok) 10/12/2021     Influenza Vaccine 65+ (FLUAD) 09/08/2023     Influenza,INJ,MDCK,PF,Quad >6mo(Flucelvax) 11/16/2022     Meningococcal ACWY (Menquadfi ) 02/06/2025     Meningococcal ACWY (Menveo ) 11/28/2024     Meningococcal B (Bexsero ) 11/28/2024, 02/11/2025     Pneumo Conj 13-V (2010&after) 11/14/2023     Pneumococcal 20 valent Conjugate (Prevnar 20) 05/09/2024     RSV Vaccine (Arexvy) 08/13/2024     TDAP (Adacel,Boostrix) 10/27/2016     Td (Adult), Adsorbed 07/01/2006     Zoster recombinant adjuvanted (Shingrix) 11/14/2023, 01/09/2024       Medications     Current Outpatient Medications   Medication Sig Dispense Refill     acyclovir (ZOVIRAX) 400 MG tablet Take 800 mg by mouth every 12 hours. Taking 800 mg BID       albuterol (PROAIR HFA/PROVENTIL HFA/VENTOLIN HFA) 108 (90 Base) MCG/ACT inhaler Inhale 2 puffs into the lungs every 6 hours as needed for shortness of breath, wheezing or cough. 18 g 0     dexAMETHasone alcohol-free (DECADRON) 0.1 MG/ML solution Swish and spit 10 mLs (1 mg) in mouth 2 times daily. (Patient taking differently: Swish and spit 1 mg in mouth 2 times daily as needed.) 500 mL 2     escitalopram (LEXAPRO) 10 MG tablet Take 1 tablet (10 mg) by mouth daily 30 tablet 3     levothyroxine (SYNTHROID/LEVOTHROID) 112 MCG tablet Take 1 tablet (112 mcg) by mouth daily. 90 tablet 4     LORazepam (ATIVAN) 1 MG tablet Take 1 tablet (1 mg) by mouth every 6 hours as needed for anxiety. PRN 20 tablet 0     metroNIDAZOLE  (METROCREAM) 0.75 % external cream Apply topically 2 times daily Apply to the nose. (Patient taking differently: Apply topically 2 times daily as needed. Apply to the nose.) 45 g 3     nicotine polacrilex (NICORETTE) 4 MG gum Place 4 mg inside cheek daily.       Nutritional Supplements (ENSURE COMPLETE SHAKE) LIQD Take 11 mLs by mouth every morning       omeprazole (PRILOSEC) 40 MG DR capsule Take 1 capsule (40 mg) by mouth daily 30 capsule 3     PANTOTHENIC ACID PO Take 100 mg by mouth daily.       penicillin V (VEETID) 250 MG tablet Take 1 tablet (250 mg) by mouth 2 times daily. 60 tablet 2     polyethylene glycol (MIRALAX) 17 GM/Dose powder Take by mouth daily as needed.       predniSONE (DELTASONE) 10 MG tablet Take 1 tablet (10 mg) by mouth daily. Alternates 10 and 15 mg every other day       predniSONE (DELTASONE) 2.5 MG tablet Take 1 tablet (2.5 mg) by mouth daily. 30 tablet 1     predniSONE (DELTASONE) 5 MG tablet Use as directed for a prednisone taper (Patient not taking: Reported on 3/25/2025) 50 tablet 2     Riboflavin 100 MG TABS Take by mouth.       rosuvastatin (CRESTOR) 5 MG tablet Take 1 tablet (5 mg) by mouth daily. 30 tablet 3     sennosides (SENOKOT) 8.6 MG tablet Take 1 tablet by mouth 3 times daily as needed for constipation 90 tablet 0     sulfamethoxazole-trimethoprim (BACTRIM DS) 800-160 MG tablet Take 1 tablet by mouth Every Mon, Wed, Fri Morning.       tacrolimus (PROTOPIC) 0.1 % external ointment Apply topically 2 times daily (Patient taking differently: Apply topically 2 times daily as needed.) 30 g 1     No current facility-administered medications for this visit.          Physical Exam     Physical Exam    Vital signs:  There were no vitals taken for this visit.    GENERAL: alert and no distress. Sitting up in chair and talking with wife.   RESP: No increased work of breathing or respiratory distress.   CV: No evidence of cyanosis.   Skin: NO skin rashes/lesions     Data      Data  Laboratory data and imaging listed below was reviewed prior to this encounter.   CT chest (10/21/2024) with patchy peribronchovascular GGOs with apical predominance. BAL (11/1/2024) without growth to date and negative BAL galactomannan (0.06). Blood cutlure (10/15/21) NGTD. RVP/influenza/SARS CoV-2 (10/15/24), Lyme antibody (10/18/24), CrAG (10/18/24) all negative. CMV VL negative (10/25) and EBV VL <35 (10/25/24). Nocardia (11/1/24) negative. Fungal/yeast culture (11/1/24) negative. Leukocytosis with elevated neutrophils 10/18/24 and 10/25/24.          ----- Service Performed and Documented by Resident or Fellow ------           Again, thank you for allowing me to participate in the care of your patient.      Sincerely,    Alba Markham MD

## 2025-04-01 NOTE — NURSING NOTE
Current patient location: [id number] [facility] [address]    Is the patient currently in the state of [place]? YES    Visit mode: VIDEO    If the visit is dropped, the patient can be reconnected by:VIDEO VISIT: Text to cell phone:   Telephone Information:   Mobile [phone]   Mobile [phone]       Will anyone else be joining the visit? YES: How would they like to receive their invitation? Text to cell phone: spouse Lamar will be presetn for visit   (If patient encounters technical issues they should call [phone]    Are changes needed to the allergy or medication list? No    Are refills needed on medications prescribed by this physician? NO    Rooming Documentation:  Questionnaire(s) completed    Reason for visit: RECHECK    Deborah BOWMANF

## 2025-04-22 DIAGNOSIS — N18.2 CKD (CHRONIC KIDNEY DISEASE) STAGE 2, GFR 60-89 ML/MIN: Primary | ICD-10-CM

## 2025-04-29 ENCOUNTER — OFFICE VISIT (OUTPATIENT)
Dept: NEPHROLOGY | Facility: CLINIC | Age: 65
End: 2025-04-29
Attending: INTERNAL MEDICINE
Payer: MEDICARE

## 2025-04-29 ENCOUNTER — LAB (OUTPATIENT)
Dept: LAB | Facility: CLINIC | Age: 65
End: 2025-04-29
Attending: INTERNAL MEDICINE
Payer: MEDICARE

## 2025-04-29 VITALS
HEART RATE: 77 BPM | HEIGHT: 73 IN | WEIGHT: 271.8 LBS | SYSTOLIC BLOOD PRESSURE: 132 MMHG | TEMPERATURE: 98.3 F | OXYGEN SATURATION: 95 % | BODY MASS INDEX: 36.02 KG/M2 | DIASTOLIC BLOOD PRESSURE: 80 MMHG

## 2025-04-29 DIAGNOSIS — T86.09 GVHD AS COMPLICATION OF BONE MARROW TRANSPLANT (H): ICD-10-CM

## 2025-04-29 DIAGNOSIS — D80.1 HYPOGAMMAGLOBULINEMIA: ICD-10-CM

## 2025-04-29 DIAGNOSIS — N18.2 CKD (CHRONIC KIDNEY DISEASE) STAGE 2, GFR 60-89 ML/MIN: ICD-10-CM

## 2025-04-29 DIAGNOSIS — E55.9 VITAMIN D DEFICIENCY: ICD-10-CM

## 2025-04-29 DIAGNOSIS — C91.01 ACUTE LYMPHOBLASTIC LEUKEMIA (ALL) IN REMISSION (H): ICD-10-CM

## 2025-04-29 DIAGNOSIS — D89.813 GVHD AS COMPLICATION OF BONE MARROW TRANSPLANT (H): ICD-10-CM

## 2025-04-29 DIAGNOSIS — N18.2 CKD (CHRONIC KIDNEY DISEASE) STAGE 2, GFR 60-89 ML/MIN: Primary | ICD-10-CM

## 2025-04-29 LAB
ALBUMIN MFR UR ELPH: 16.7 MG/DL
ALBUMIN SERPL BCG-MCNC: 4.1 G/DL (ref 3.5–5.2)
ALBUMIN UR-MCNC: NEGATIVE MG/DL
ALP SERPL-CCNC: 83 U/L (ref 40–150)
ALT SERPL W P-5'-P-CCNC: 28 U/L (ref 0–70)
ANION GAP SERPL CALCULATED.3IONS-SCNC: 8 MMOL/L (ref 7–15)
APPEARANCE UR: CLEAR
AST SERPL W P-5'-P-CCNC: 34 U/L (ref 0–45)
BILIRUB SERPL-MCNC: 0.5 MG/DL
BILIRUB UR QL STRIP: NEGATIVE
BUN SERPL-MCNC: 36.6 MG/DL (ref 8–23)
CALCIUM SERPL-MCNC: 9.7 MG/DL (ref 8.8–10.4)
CHLORIDE SERPL-SCNC: 105 MMOL/L (ref 98–107)
COLOR UR AUTO: YELLOW
CREAT SERPL-MCNC: 1.26 MG/DL (ref 0.67–1.17)
CREAT UR-MCNC: 78.4 MG/DL
CREAT UR-MCNC: 78.5 MG/DL
EGFRCR SERPLBLD CKD-EPI 2021: 63 ML/MIN/1.73M2
ERYTHROCYTE [DISTWIDTH] IN BLOOD BY AUTOMATED COUNT: 15.9 % (ref 10–15)
GLUCOSE SERPL-MCNC: 167 MG/DL (ref 70–99)
GLUCOSE UR STRIP-MCNC: NEGATIVE MG/DL
HCO3 SERPL-SCNC: 25 MMOL/L (ref 22–29)
HCT VFR BLD AUTO: 34.4 % (ref 40–53)
HGB BLD-MCNC: 12 G/DL (ref 13.3–17.7)
HGB UR QL STRIP: NEGATIVE
KETONES UR STRIP-MCNC: NEGATIVE MG/DL
LEUKOCYTE ESTERASE UR QL STRIP: NEGATIVE
MCH RBC QN AUTO: 34.3 PG (ref 26.5–33)
MCHC RBC AUTO-ENTMCNC: 34.9 G/DL (ref 31.5–36.5)
MCV RBC AUTO: 98 FL (ref 78–100)
MICROALBUMIN UR-MCNC: 75.1 MG/L
MICROALBUMIN/CREAT UR: 95.67 MG/G CR (ref 0–17)
MUCOUS THREADS #/AREA URNS LPF: PRESENT /LPF
NITRATE UR QL: NEGATIVE
PH UR STRIP: 6 [PH] (ref 5–7)
PHOSPHATE SERPL-MCNC: 2.4 MG/DL (ref 2.5–4.5)
PLATELET # BLD AUTO: 213 10E3/UL (ref 150–450)
POTASSIUM SERPL-SCNC: 4.5 MMOL/L (ref 3.4–5.3)
PROT SERPL-MCNC: 6.5 G/DL (ref 6.4–8.3)
PROT/CREAT 24H UR: 0.21 MG/MG CR (ref 0–0.2)
RBC # BLD AUTO: 3.5 10E6/UL (ref 4.4–5.9)
RBC URINE: 1 /HPF
SODIUM SERPL-SCNC: 138 MMOL/L (ref 135–145)
SP GR UR STRIP: 1.02 (ref 1–1.03)
UROBILINOGEN UR STRIP-MCNC: NORMAL MG/DL
WBC # BLD AUTO: 10.6 10E3/UL (ref 4–11)
WBC URINE: 0 /HPF

## 2025-04-29 PROCEDURE — 82784 ASSAY IGA/IGD/IGG/IGM EACH: CPT

## 2025-04-29 PROCEDURE — 85014 HEMATOCRIT: CPT

## 2025-04-29 PROCEDURE — 82570 ASSAY OF URINE CREATININE: CPT

## 2025-04-29 PROCEDURE — 84100 ASSAY OF PHOSPHORUS: CPT

## 2025-04-29 PROCEDURE — 81001 URINALYSIS AUTO W/SCOPE: CPT

## 2025-04-29 PROCEDURE — 36591 DRAW BLOOD OFF VENOUS DEVICE: CPT

## 2025-04-29 PROCEDURE — 84156 ASSAY OF PROTEIN URINE: CPT

## 2025-04-29 PROCEDURE — 250N000011 HC RX IP 250 OP 636: Performed by: INTERNAL MEDICINE

## 2025-04-29 PROCEDURE — 82040 ASSAY OF SERUM ALBUMIN: CPT

## 2025-04-29 PROCEDURE — 3075F SYST BP GE 130 - 139MM HG: CPT | Performed by: INTERNAL MEDICINE

## 2025-04-29 PROCEDURE — 3079F DIAST BP 80-89 MM HG: CPT | Performed by: INTERNAL MEDICINE

## 2025-04-29 PROCEDURE — 99215 OFFICE O/P EST HI 40 MIN: CPT | Performed by: INTERNAL MEDICINE

## 2025-04-29 PROCEDURE — G2211 COMPLEX E/M VISIT ADD ON: HCPCS | Performed by: INTERNAL MEDICINE

## 2025-04-29 PROCEDURE — 1126F AMNT PAIN NOTED NONE PRSNT: CPT | Performed by: INTERNAL MEDICINE

## 2025-04-29 PROCEDURE — G0463 HOSPITAL OUTPT CLINIC VISIT: HCPCS | Performed by: INTERNAL MEDICINE

## 2025-04-29 RX ORDER — HEPARIN SODIUM (PORCINE) LOCK FLUSH IV SOLN 100 UNIT/ML 100 UNIT/ML
5 SOLUTION INTRAVENOUS ONCE
Status: COMPLETED | OUTPATIENT
Start: 2025-04-29 | End: 2025-04-29

## 2025-04-29 RX ADMIN — HEPARIN 3 ML: 100 SYRINGE at 12:57

## 2025-04-29 ASSESSMENT — PAIN SCALES - GENERAL: PAINLEVEL_OUTOF10: NO PAIN (0)

## 2025-04-29 NOTE — LETTER
4/29/2025       RE: Nik Nava  [id number] [facility] [id number]     Dear Colleague,    Thank you for referring your patient, Nik Nava, to the [facility] [place] at [facility]. Please see a copy of my visit note below.      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      Oncologic history: The patient was diagnosed with Ph+ ALL in 2020 after presented with feeling unwell and found to have leukocytosis and blast in his blood. He was treated with Dex and Dasatinib then 2 cycles of vincristine, prednisone, daunorubicin, and pegasparaginase with intrathecal methotrexate. Last ly, he received 1 course of high dose Jaymie-C. He achieved CR with no MRD. Subsequently, he underwent allo sib NMA (flu/cy+TBI) PBSCT on  8/10/21. hematocrit-CI was 3.  The patient was noted to be in CR with BmBx at D100, D180 and 1Y showing 100% donor. He has not been started on TKI due to previous multiple active issues. Post Tx complication included cGVHD at skin, eyes, o liver (all Bx proven, except eyes, clinically) started since 2/22. The current active cGVHDD involved eyes and skin. He was on Tacrolimus for cGVHD but later discontinued due to NEGRA, hyperkalemia hypomagnesemia, tremors and cramps in 9/22. Sirolimus was started in 9/21/22 in replacement of Tac. Cr improved, but the presented on 10/11/22 with ocular and cGVHD flare, fluid retention and gain 5-6 kg. BMT thought this is sirolimus Tox? (of note UPCR was only 0.4 and normal SAlb). Sirolimus was then stopped. Prednisone was started at 40 mg with slow tapering. He was also started on Belumosudil on 10/18/22 (ROCK2  inhibitors:Rho-associated coiled-coil kinase )  Other pertinent Hx:   - He had PE in the setting of receiving PEG asparaginase and was treated with Xarelto which is now completed.   - He had hyperferritinemia which now being followed closely. Not on iron chelators.  - He had COVID -19 in 5/22.  - CMV viremia  - Grave disease  - HTN  Kidney history: He had RCC s/p Rt nephrectomy in 2007.  The patient has baseline creatinine of 0.9-1 pretransplant.  He developed NEGRA in 9/21 with Cr peaked at 1.89 but then come down to baseline. He had NEGRA again in 9/22 with Cr peaked at 1.8, suspect that due to tacrolimus toxicity and it was stopped. Cr improved but now still fluctuates between 1.1-1.3.  The most recent creatinine was on 10/31/2022 at 1.08.  Serum albumin is 3.3. UCPR is 0.39 g/g on 10/18/22. UA on 9/12/21 and 10/18/22 showed no protein, no blood.  However UA on 9/12/2021 showed 8 hyaline cast.      11/1/22: He is doing well except that he still has LE edema. It has improved from when he was on Sirolimus but still quite a bit. Edema has actually started since 2021 but unclear when.But it is certainly getting worse lately when he was treated with high dose steroid and sirolimus.His cGVHD is o/eva all stable but not completely gone. He was just started Belumosudil on 10/18/22 for cGVHD. He just saw BMT today and they are decreasing his steroid.Amlodipine started since 8/21 and possibly related to his swelling. He has multiple SEs from steroid such as weight gain , central obesity, easily bruised, increased appetite and partial insomnia. He has some nocturia. He has no dysuria or hematuria. Recently, he had cat scrath incidence and being treated with doxycycline. He has abdominal distension and gained weight progressively. He drinks 120 Oz of tea per day. We started chlorthalidone and reduced amlodipine.   12/1-12/3/22: Was admitted for dyspnea and malaise concerning for belumosudil intolerance. However, at the same time  he also had flu A positive. He was taken off of amlodipine on 12/6/22. And subsequently chlorthalidone was off in 12/13/22 when Na was 128. Na improved afterwards.   1/10/23:  He feels good today. His BP are now very good 110-130/70 mmHg. He is not on any BP medication ayt this time. He is taking Belumosidil. Symptoms of cGVHD have been improving.  He has some LE edema and easily bruised likely from steroid/belumosudil.   Labs: Creatinine 1.11, potassium 4.8,, dioxide 28, phosphorus 2.3, albumin 4.2, calcium 10.1.  Hemoglobin 11.3, platelet 140. UA pending.   6/13/23: In the interim, he went to the ED on 5/14/23 for testicular pain. CT showed no hernia or mass. Hew was also diagnosed with spine osteomyelitis and Epidural abscess and being treated with Ceftraixone for 6 weeks. Noted stable cGVHD. He also had COVID in 1/23. Now on physiologica dose of steroid. Still has dry eyes and a bit of mouth sore.  He drinks about 3-4 glasses of milk per day. He still has back pain that radiating downt to right leg. He completed ceftriaxone in 5/25/23. Appetite is stable as well as his weight. He has minimal swelling. He is on prednisone 4 mg alternate with 10 mg per day. Labs 6/13/23: Cr 1.10, BUN 46.2, K 5.1, Calcium 10.3, Phos 3.1, Albumin 4.0. Hb 11.7, Platelet 182, WBC 10.1.   10/16/23: In interim, he was diagnosed with L5-S1 discitis osteomyelitis at [facility] after presented with ongoing back pain.  MRI showing findings consistent with ongoing discitis-osteomyelitis at L5-S1 which has progressed since last imaging in June. He was seen by ID and neurosurgery. He underwent disc aspiration.  ID is recommending another 6 weeks course of antibiotics with a different regimen: Invanz and vancomycin. Neurosurgery did not recommend any surgical intervention at this time. Vanco dose was reduced on 10/10/23 due to kidney function and stopped last Thursday when Cr was 1.87. Lately, his blood pressure has been increasing to  150-160 and sometimes 180. I asked him to resume amlodipine 5 mg.  After stopping vancomycin, BP has improved along with his appetite. Cr has improved  to 1.68 on 10/16/23 ([facility] labs). He has gained some weight. His swelling was worse when he was admitted in the hospital. Now he is on Ertapenenm and clindamycin oral until 10/23. Pain is mostly when he lays too long and going to get up. He is still using fentanyl patch but the dose was reduced. Belumosidil was just on hold.  Labs on 10/10/2023 showed creatinine 1.56 but Cr 1.678 on 10/16/23 ([facility] lab), BUN 26, GFR 49, potassium 3.5,, dioxide 28, calcium 10.2, albumin 4.1, alkaline phosphatase 141.  CBC show white blood cell 13, hemoglobin 10.9, platelet 213. Vancomycin at [facility] was 21.4 on 10/9/23. CRP 2.2 and ESR 46. UA on 10/12/23 showed no blood but UPCR is 0.2 g/g.   11/14/23: He has been on Augmentin and Doxycyline plan until end of this month . Clindamycin upsets his currently due to main complaint is low energy and restless leg syndrome that started about 2 weeks ago.  He recently stopped taking magnesium and iron a month ago.  Stomach. He has minor swelling. He is off of fentanyl 2 weeks ago. BP has been in the 120-130/80s. His Cr has improved and from [facility] lab, Cr 1.26 on 11/6/23. He has been eating and drinking well. He is on prednisone. He started to have restless leg.  Labs today show WBC 11.5, Hb 11.6.  Creatinine 1.4, EGFR 56, potassium 4.8, bicarb 24.  calcium 10.1 albumin 4.4.  CRP less than 3, sed rate 27.  UA showed protein 50 mg/dL.  Platelets of 2, RBC less than 1.UPCR pending.   2/20/24: He has relapsed of oral GVHD and prednisone was increased to 40 mg/d and now on tapering dose. He is also taking valtrex which has helped with HSV. In the meantime, his BP were in the 140-150 so chlorthalidone 12.5 mg was added at the end of January 2024. He also was admitted at the end of January for fever, chest pain but all work-up were unrevealing. He  was treated him with Doxycycline for 14 days and now he feels better. Labs today showed Na 133, K 4.6, Bicarb 25, Cr 1.06 and BUN 40.6. CBC showed WBC 11.7, Hb 12.6. UA is bland. UPCR 0.27 g/g.   7/9/24: In the interim, he stopped chlorthalidone on 6/17/24 due to low BP and no swelling. He was hospitalized for PNA between 5/28-6/1 and was initially given IV fluids and IV antibiotics but then transitioned to complete a 10-day course of cefdinir. Later on he has right leg pain, he presented to a local ER on June 11.  Venous Doppler US showed no DVT.  He briefly took Tylenol and ibuprofen for pain control.  Today, he has no fever but a bit of leg swelling. He wears compression stocking. He is on prednisone 10 milligram alternate 15 mg for GVHD.  Patient denies of any unexpected weight gain or weight loss, chest pain, shortness of breath, nausea, vomiting, change in urine output.  Labs today shows Cr 1.32, , K4.2, WBC 11.5, hemoglobin 11.8, UA spec gravity 1.031, urine protein albumin 70mg/dL, protein/creat urine 0.16.    1/7/2025: In the interim, the patient was admitted for PJP pneumonia and was treated with Bactrim IV for 3 weeks and now on oral Bactrim 3 times per weeks. Later on, he developed parainfluenza URI and admitted again in mid December 2024.  He was started on Jakafi on 12/13/24.  He had volume depletion with shock, BP in the 60s. He was also diagnosed with influenza A on 12/31/24 and was started on Tamiflu. Jakafi stopped on 12/27/24 due to hypotension. He is on 15 mg alternating with 10 mg daily. Today, still has some cough and weak. No fever or SOB. No problem with swelling. He has no proble with urination.   Labs today showed Cr 1.23, eGFR 65, K 4.4, CO2 23, Ca 9.6, Phos 2.9, albumin 4.0.  Hemoglobin 11.2, platelets 71.  UA showed no blood. UPCR 0.24 g/g.     4/29/25: Prednisone tapering slowly and now he is on prednisone 7.5 mg daily. Now only mouth sore seems to be the only symptoms. No recent  infection. BP are ok at home but not much check it frequently. He has a bit swelling in his legs. No problem with urination. He started using CPAP since March 2025 he does not notice any change in his symptoms.  He has some lower extremity edema.  Lately he has not wear compression stocking.  Labs today show creatinine 1.26, BUN 36.6, potassium 4.5, bicarb 25, calcium 9.7, phosphorus 2.4, Albumin 4.1.  CBC show hemoglobin 12 white blood cell 10.6 and platelet 213.  UA is completely bland. UPCR 0.21 g/g.       Past medical history  Past Medical History:   Diagnosis Date     ALL (acute lymphocytic leukemia) (H)      CKD (chronic kidney disease)      GVHD (graft versus host disease) (H)      H/O peripheral stem cell transplant (H)      Hyperthyroidism 1996     Infection due to 2019 novel coronavirus 01/16/2023     Infection due to 2019 novel coronavirus 05/18/2022     Influenza A 11/2022     Postablative hypothyroidism 1997     Pulmonary embolism (H)      Renal cell carcinoma (H) 2007    right kidney     Sleep apnea        Past surgical history  Past Surgical History:   Procedure Laterality Date     APPENDECTOMY       BACK SURGERY  2017     BONE MARROW BIOPSY, BONE SPECIMEN, NEEDLE/TROCAR Left 09/02/2021    Procedure: BIOPSY, BONE MARROW;  Surgeon: Jailyn Ny APRN CNP;  Location: [facility] OR     BONE MARROW BIOPSY, BONE SPECIMEN, NEEDLE/TROCAR Left 11/15/2021    Procedure: BIOPSY, BONE MARROW;  Surgeon: Socrates De La Torre;  Location: [facility] OR     BONE MARROW BIOPSY, BONE SPECIMEN, NEEDLE/TROCAR Left 02/07/2022    Procedure: BIOPSY, BONE MARROW;  Surgeon: Renetta Wiggins PA-C;  Location: [facility] OR     BONE MARROW BIOPSY, BONE SPECIMEN, NEEDLE/TROCAR Left 08/18/2022    Procedure: BIOPSY, BONE MARROW;  Surgeon: Lorrie Yap PA-C;  Location: [facility] OR     BONE MARROW BIOPSY, BONE SPECIMEN, NEEDLE/TROCAR Left 08/07/2023    Procedure: Bone marrow biopsy, bone specimen, needle/trocar;  Surgeon: Teressa Rick  MIREYA KEARNS;  Location: [facility] OR     BRONCHOSCOPY (RIGID OR FLEXIBLE), DIAGNOSTIC N/A 04/29/2022    Procedure: BRONCHOSCOPY, WITH BRONCHOALVEOLAR LAVAGE;  Surgeon: Murtaza Garcia MD;  Location: [facility]     BRONCHOSCOPY (RIGID OR FLEXIBLE), DIAGNOSTIC N/A 11/1/2024    Procedure: BRONCHOSCOPY, WITH BRONCHOALVEOLAR LAVAGE;  Surgeon: Murtaza Garcia MD;  Location: [facility]     IR CVC TUNNEL PLACEMENT > 5 YRS OF AGE  08/04/2021     IR CVC TUNNEL REMOVAL LEFT  09/02/2021     IR LIVER BIOPSY PERCUTANEOUS  02/21/2022     IR LUMBAR PUNCTURE  1/21/2021     NEPHRECTOMY  2007     ORTHOPEDIC SURGERY      bilateral shoulder surgeries     PERCUTANEOUS BIOPSY LIVER N/A 02/21/2022    Procedure: NEEDLE BIOPSY, LIVER, PERCUTANEOUS;  Surgeon: Trace Castellanos MD;  Location: [facility] OR     PHACOEMULSIFICATION WITH STANDARD INTRAOCULAR LENS IMPLANT Left 1/15/2024    Procedure: LEFT EYE PHACOEMULSIFICATION, CATARACT, WITH STANDARD INTRAOCULAR LENS IMPLANT INSERTION;  Surgeon: Deshawn Menezes MD;  Location: [facility] OR     PHACOEMULSIFICATION WITH STANDARD INTRAOCULAR LENS IMPLANT Right 2/5/2024    Procedure: RIGHT EYE PHACOEMULSIFICATION, CATARACT, WITH STANDARD INTRAOCULAR LENS IMPLANT INSERTION;  Surgeon: Deshawn Menezes MD;  Location: [facility] OR       Review of Systems:   14 systems were reviewed and all negative except as mentioned above.   Current Medications:  Current Outpatient Medications   Medication Sig Dispense Refill     acyclovir (ZOVIRAX) 400 MG tablet Take 800 mg by mouth every 12 hours. Taking 800 mg BID       dexAMETHasone alcohol-free (DECADRON) 0.1 MG/ML solution Swish and spit 10 mLs (1 mg) in mouth 2 times daily. (Patient taking differently: Swish and spit 1 mg in mouth 2 times daily as needed.) 500 mL 2     escitalopram (LEXAPRO) 10 MG tablet Take 1 tablet (10 mg) by mouth daily 30 tablet 3     levothyroxine (SYNTHROID/LEVOTHROID) 112 MCG tablet Take 1 tablet (112 mcg) by mouth daily. 90 tablet 4     nicotine  polacrilex (NICORETTE) 4 MG gum Place 4 mg inside cheek daily.       Nutritional Supplements (ENSURE COMPLETE SHAKE) LIQD Take 11 mLs by mouth every morning       omeprazole (PRILOSEC) 40 MG DR capsule Take 1 capsule (40 mg) by mouth daily 30 capsule 3     PANTOTHENIC ACID PO Take 100 mg by mouth daily.       penicillin V (VEETID) 250 MG tablet Take 1 tablet (250 mg) by mouth 2 times daily. 60 tablet 2     predniSONE (DELTASONE) 2.5 MG tablet Take 1 tablet (2.5 mg) by mouth daily. 30 tablet 1     predniSONE (DELTASONE) 5 MG tablet Use as directed for a prednisone taper 50 tablet 2     Riboflavin 100 MG TABS Take by mouth.       rosuvastatin (CRESTOR) 5 MG tablet Take 1 tablet (5 mg) by mouth daily. 30 tablet 3     sulfamethoxazole-trimethoprim (BACTRIM DS) 800-160 MG tablet Take 1 tablet by mouth Every Mon, Wed, Fri Morning.       albuterol (PROAIR HFA/PROVENTIL HFA/VENTOLIN HFA) 108 (90 Base) MCG/ACT inhaler Inhale 2 puffs into the lungs every 6 hours as needed for shortness of breath, wheezing or cough. (Patient not taking: Reported on 4/29/2025) 18 g 0     LORazepam (ATIVAN) 1 MG tablet Take 1 tablet (1 mg) by mouth every 6 hours as needed for anxiety. PRN (Patient not taking: Reported on 4/29/2025) 20 tablet 0     metroNIDAZOLE (METROCREAM) 0.75 % external cream Apply topically 2 times daily Apply to the nose. (Patient not taking: Reported on 4/29/2025) 45 g 3     polyethylene glycol (MIRALAX) 17 GM/Dose powder Take by mouth daily as needed. (Patient not taking: Reported on 4/29/2025)       predniSONE (DELTASONE) 10 MG tablet Take 1 tablet (10 mg) by mouth daily. Alternates 10 and 15 mg every other day (Patient not taking: Reported on 4/29/2025)       sennosides (SENOKOT) 8.6 MG tablet Take 1 tablet by mouth 3 times daily as needed for constipation (Patient not taking: Reported on 4/29/2025) 90 tablet 0     tacrolimus (PROTOPIC) 0.1 % external ointment Apply topically 2 times daily (Patient not taking: Reported  "on 4/29/2025) 30 g 1     No current facility-administered medications for this visit.     Facility-Administered Medications Ordered in Other Visits   Medication Dose Route Frequency Provider Last Rate Last Admin     saline flush for lab use ONLY  20 mL Intracatheter Once Keegan Chew MD           Physical Exam:   /80 (BP Location: Right arm, Patient Position: Sitting, Cuff Size: Adult Regular)   Pulse 77   Temp 98.3  F (36.8  C) (Oral)   Ht 1.854 m (6' 1\")   Wt 123.3 kg (271 lb 12.8 oz)   SpO2 95%   BMI 35.86 kg/m     Body mass index is 35.86 kg/m .  /80 (BP Location: Right arm, Patient Position: Sitting, Cuff Size: Adult Regular)   Pulse 77   Temp 98.3  F (36.8  C) (Oral)   Ht 1.854 m (6' 1\")   Wt 123.3 kg (271 lb 12.8 oz)   SpO2 95%   BMI 35.86 kg/m       GENERAL APPEARANCE: Alert, not in acute distress; pleasant. + Cushingoid appearance; slight improved  EYES:  Not pale conjunctiva, pupils equal  HENT: Mouth without ulcers or lesions  PULM: lungs clear to auscultation bilaterally, equal air movement, no clubbing  CV: regular rhythm, normal rate, no rub     -JVD no distended.      -edema: + 1+ pitting edema  GI: soft,  - tender, no distended, bowel sounds are present  INTEGUMENT: No rashes.   NEURO:  Non focal. No asterixis.     Labs:   All labs reviewed by me  Last Renal Panel:  Sodium   Date Value Ref Range Status   04/29/2025 138 135 - 145 mmol/L Final   07/08/2021 139 133 - 144 mmol/L Final     Potassium   Date Value Ref Range Status   04/29/2025 4.5 3.4 - 5.3 mmol/L Final   11/15/2022 5.0 3.4 - 5.3 mmol/L Final   07/08/2021 3.9 3.4 - 5.3 mmol/L Final     Potassium POCT   Date Value Ref Range Status   12/01/2022 4.8 3.4 - 5.3 mmol/L Final     Chloride   Date Value Ref Range Status   04/29/2025 105 98 - 107 mmol/L Final   11/15/2022 101 94 - 109 mmol/L Final   07/08/2021 108 94 - 109 mmol/L Final     Carbon Dioxide   Date Value Ref Range Status   07/08/2021 23 20 - 32 mmol/L Final "     Carbon Dioxide (CO2)   Date Value Ref Range Status   04/29/2025 25 22 - 29 mmol/L Final   11/15/2022 30 20 - 32 mmol/L Final     Anion Gap   Date Value Ref Range Status   04/29/2025 8 7 - 15 mmol/L Final   11/15/2022 3 3 - 14 mmol/L Final   07/08/2021 7 3 - 14 mmol/L Final     Glucose   Date Value Ref Range Status   04/29/2025 167 (H) 70 - 99 mg/dL Final   11/15/2022 113 (H) 70 - 99 mg/dL Final   07/08/2021 107 (H) 70 - 99 mg/dL Final     GLUCOSE BY METER POCT   Date Value Ref Range Status   11/27/2024 124 (H) 70 - 99 mg/dL Final     Urea Nitrogen   Date Value Ref Range Status   04/29/2025 36.6 (H) 8.0 - 23.0 mg/dL Final   11/15/2022 51 (H) 7 - 30 mg/dL Final   07/08/2021 24 7 - 30 mg/dL Final     Creatinine   Date Value Ref Range Status   04/29/2025 1.26 (H) 0.67 - 1.17 mg/dL Final   07/08/2021 0.94 0.66 - 1.25 mg/dL Final     GFR Estimate   Date Value Ref Range Status   04/29/2025 63 >60 mL/min/1.73m2 Final     Comment:     eGFR calculated using 2021 CKD-EPI equation.   07/08/2021 86 >60 mL/min/[1.73_m2] Final     Comment:     Non  GFR Calc  Starting 12/18/2018, serum creatinine based estimated GFR (eGFR) will be   calculated using the Chronic Kidney Disease Epidemiology Collaboration   (CKD-EPI) equation.       Calcium   Date Value Ref Range Status   04/29/2025 9.7 8.8 - 10.4 mg/dL Final   07/08/2021 8.9 8.5 - 10.1 mg/dL Final     Phosphorus   Date Value Ref Range Status   04/29/2025 2.4 (L) 2.5 - 4.5 mg/dL Final     Albumin   Date Value Ref Range Status   04/29/2025 4.1 3.5 - 5.2 g/dL Final   11/15/2022 3.6 3.4 - 5.0 g/dL Final   07/08/2021 3.7 3.4 - 5.0 g/dL Final       Imaging:  I reviewed imaging studies.     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 However, he had worsening swelling and increased Cr to 1.39. At that time, it was concerned that he may have sirolimus toxicity. But he only had UCPR 0.39 g/g at that time. However, Sirolimus was stopped. I started him on low dose chlorthalidone and reduced dose amlodipine and his swelling improved but he developed hypotension so all blood pressure medications were discontinued.  His creatinine then came down to be stable at 1.1 with a EGFR in the 70s. He developed acute kidney injury stage I again likely secondary to vancomycin toxicity in October 2023. Cr peaked at 1.87 on 10/12/23 but later on improved and now down to 1.0-1.1. He has PNA in June 2024, his Cr were then in the range of 1.1-1.30. He has NEGRA again in Nov 2024 due to high dose bactrim, Cr up to 2.15 and another episode in Dec 2024 due to volume depletion. Now Cr is down to 1.2-1.3. UA has no blood but with  minimal proteinuria, UPCR 0.20g/g.  His blood pressure has improved and now no episode of hypotension and no recent episode of infection.  Given persistent proteinuria, at some point if he is more stable we can consider starting him on low-dose RAASi.  Of note the patient has 1+ pitting edema today which is new compared to last time.  But the patient denies any short of breath and blood pressure is 130/80.  The patient will try to wear compression stocking morning but if it does not improve they will let us know and we will consider starting him on diuretics.    - In the future, if he is stable we may consider starting him on low-dose RAASi  - Closely monitor swelling and blood pressure if it is increasing then let us  know and we can start him on diuretics  - Advised him to wear compression stocking  - Stay well hydrated, goal fluid intake 60-70 OZ daily  - Low salt diet; NA 2000 mg per day  # Hx of PJP  S/p high dose bactrim and now on bactrim PPX DS 1 tab thrice weekly  # L5/S1 discitis; recurrent; resolved  Started on Ertapenem and Vanco since August 2023, plan for 6 weeks but Vanco stopped on 10/12/23 due to NEGRA  replaced with Clindamycin but then switched to Augmentin and doxycycline. Now completed Abx.  # Hypertension; well controlled  # Hx of hypotension with Jakafi?  Blood pressure started to increase after he developed NEGRA in October 2023.  It was as high as 180s sometimes.  After he resumed amlodipine 5 mg, BP improved. However, BP worsened after escalation of prednisone dose in Dec 2023 so Cholrthalidone was added since 1/24/24 with improvement in swelling and BP. Previously, he has mild hypoNa but after cholthalidone stopped in 6/17/24, hypoNa improves. Now completely off BP meds and  recently had episode of hypotension with Jakafi.  Blood pressure today is 124/78.   - Was on Amlodipine 5 mg daily but stopped since December 2024 due to hypotension  - Was on chlorthalidone 12.5 mg daily stopped since 6/17/24 due to hypotension with SBP 80-90s  - Blood pressure 132/80 but he has swelling so if this persists of blood pressure worsen may consider adding on low-dose torsemide  - Continue to avoid NSAIDs use  # Mild hypercalcemia; resolved  Calcium was mildly elevated at 10.3 and 10.1 on previous visits. Ca level today 9.7. The patient is taking calcium vitamin D 1 tabs per day. Vit D is 65 on 6/13/23. PTH is 58 on 12/21/23. 10/2024: Vit D 40 and PTH 36. Ca/P normal.   # Mild hypoNa; resolved  - resolved after Cholthalidone stopped.   # Hypogammaglobulinemia  IgG 575 on 3/12/25.  -Receives intermittent IVIG infusions for IgG levels <400    The longitudinal plan of care for CKD stage 2/3, HTN, post ASCT, GVHD was addressed  during this visit. Due to the added complexity in care, I will continue to support Nik Nava in the subsequent management of this condition(s) and with the ongoing continuity of care of this condition(s).     I spent  40 minutes on the date of the encounter doing chart review, history and exam, documentation and further activities as noted above. 26 minutes of this visit is dedicated to direct patient interaction via face to face.      Follow-up in 4 months with labs    Keegan Chew MD on 04/29/2025            Again, thank you for allowing me to participate in the care of your patient.      Sincerely,    Keegan Chew MD

## 2025-04-29 NOTE — PROGRESS NOTES
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      Oncologic history: The patient was diagnosed with Ph+ ALL in 2020 after presented with feeling unwell and found to have leukocytosis and blast in his blood. He was treated with Dex and Dasatinib then 2 cycles of vincristine, prednisone, daunorubicin, and pegasparaginase with intrathecal methotrexate. Last ly, he received 1 course of high dose Jaymie-C. He achieved CR with no MRD. Subsequently, he underwent allo sib NMA (flu/cy+TBI) PBSCT on  8/10/21. hematocrit-CI was 3.  The patient was noted to be in CR with BmBx at D100, D180 and 1Y showing 100% donor. He has not been started on TKI due to previous multiple active issues. Post Tx complication included cGVHD at skin, eyes, o liver (all Bx proven, except eyes, clinically) started since 2/22. The current active cGVHDD involved eyes and skin. He was on Tacrolimus for cGVHD but later discontinued due to NEGRA, hyperkalemia hypomagnesemia, tremors and cramps in 9/22. Sirolimus was started in 9/21/22 in replacement of Tac. Cr improved, but the presented on 10/11/22 with ocular and cGVHD flare, fluid retention and gain 5-6 kg. BMT thought this is sirolimus Tox? (of note UPCR was only 0.4 and normal SAlb). Sirolimus was then stopped. Prednisone was started at 40 mg with slow tapering. He was also started on Belumosudil on 10/18/22 (ROCK2 inhibitors:Rho-associated coiled-coil kinase )  Other pertinent Hx:   - He had PE in the setting of receiving PEG asparaginase and was treated with Xarelto which is now completed.   - He had hyperferritinemia which now being followed closely. Not on iron chelators.  - He had COVID -19 in 5/22.  - CMV viremia  - Grave  disease  - HTN  Kidney history: He had RCC s/p Rt nephrectomy in 2007.  The patient has baseline creatinine of 0.9-1 pretransplant.  He developed NEGRA in 9/21 with Cr peaked at 1.89 but then come down to baseline. He had NEGRA again in 9/22 with Cr peaked at 1.8, suspect that due to tacrolimus toxicity and it was stopped. Cr improved but now still fluctuates between 1.1-1.3.  The most recent creatinine was on 10/31/2022 at 1.08.  Serum albumin is 3.3. UCPR is 0.39 g/g on 10/18/22. UA on 9/12/21 and 10/18/22 showed no protein, no blood.  However UA on 9/12/2021 showed 8 hyaline cast.      11/1/22: He is doing well except that he still has LE edema. It has improved from when he was on Sirolimus but still quite a bit. Edema has actually started since 2021 but unclear when.But it is certainly getting worse lately when he was treated with high dose steroid and sirolimus.His cGVHD is o/eva all stable but not completely gone. He was just started Belumosudil on 10/18/22 for cGVHD. He just saw BMT today and they are decreasing his steroid.Amlodipine started since 8/21 and possibly related to his swelling. He has multiple SEs from steroid such as weight gain , central obesity, easily bruised, increased appetite and partial insomnia. He has some nocturia. He has no dysuria or hematuria. Recently, he had cat scrath incidence and being treated with doxycycline. He has abdominal distension and gained weight progressively. He drinks 120 Oz of tea per day. We started chlorthalidone and reduced amlodipine.   12/1-12/3/22: Was admitted for dyspnea and malaise concerning for belumosudil intolerance. However, at the same time he also had flu A positive. He was taken off of amlodipine on 12/6/22. And subsequently chlorthalidone was off in 12/13/22 when Na was 128. Na improved afterwards.   1/10/23:  He feels good today. His BP are now very good 110-130/70 mmHg. He is not on any BP medication ayt this time. He is taking Belumosidil.  Symptoms of cGVHD have been improving.  He has some LE edema and easily bruised likely from steroid/belumosudil.   Labs: Creatinine 1.11, potassium 4.8,, dioxide 28, phosphorus 2.3, albumin 4.2, calcium 10.1.  Hemoglobin 11.3, platelet 140. UA pending.   6/13/23: In the interim, he went to the ED on 5/14/23 for testicular pain. CT showed no hernia or mass. Hew was also diagnosed with spine osteomyelitis and Epidural abscess and being treated with Ceftraixone for 6 weeks. Noted stable cGVHD. He also had COVID in 1/23. Now on physiologica dose of steroid. Still has dry eyes and a bit of mouth sore.  He drinks about 3-4 glasses of milk per day. He still has back pain that radiating downt to right leg. He completed ceftriaxone in 5/25/23. Appetite is stable as well as his weight. He has minimal swelling. He is on prednisone 4 mg alternate with 10 mg per day. Labs 6/13/23: Cr 1.10, BUN 46.2, K 5.1, Calcium 10.3, Phos 3.1, Albumin 4.0. Hb 11.7, Platelet 182, WBC 10.1.   10/16/23: In interim, he was diagnosed with L5-S1 discitis osteomyelitis at [facility] after presented with ongoing back pain.  MRI showing findings consistent with ongoing discitis-osteomyelitis at L5-S1 which has progressed since last imaging in June. He was seen by ID and neurosurgery. He underwent disc aspiration.  ID is recommending another 6 weeks course of antibiotics with a different regimen: Invanz and vancomycin. Neurosurgery did not recommend any surgical intervention at this time. Vanco dose was reduced on 10/10/23 due to kidney function and stopped last Thursday when Cr was 1.87. Lately, his blood pressure has been increasing to 150-160 and sometimes 180. I asked him to resume amlodipine 5 mg.  After stopping vancomycin, BP has improved along with his appetite. Cr has improved  to 1.68 on 10/16/23 ([facility] labs). He has gained some weight. His swelling was worse when he was admitted in the hospital. Now he is on Ertapenenm and clindamycin  oral until 10/23. Pain is mostly when he lays too long and going to get up. He is still using fentanyl patch but the dose was reduced. Belumosidil was just on hold.  Labs on 10/10/2023 showed creatinine 1.56 but Cr 1.678 on 10/16/23 ([facility] lab), BUN 26, GFR 49, potassium 3.5,, dioxide 28, calcium 10.2, albumin 4.1, alkaline phosphatase 141.  CBC show white blood cell 13, hemoglobin 10.9, platelet 213. Vancomycin at [facility] was 21.4 on 10/9/23. CRP 2.2 and ESR 46. UA on 10/12/23 showed no blood but UPCR is 0.2 g/g.   11/14/23: He has been on Augmentin and Doxycyline plan until end of this month . Clindamycin upsets his currently due to main complaint is low energy and restless leg syndrome that started about 2 weeks ago.  He recently stopped taking magnesium and iron a month ago.  Stomach. He has minor swelling. He is off of fentanyl 2 weeks ago. BP has been in the 120-130/80s. His Cr has improved and from [facility] lab, Cr 1.26 on 11/6/23. He has been eating and drinking well. He is on prednisone. He started to have restless leg.  Labs today show WBC 11.5, Hb 11.6.  Creatinine 1.4, EGFR 56, potassium 4.8, bicarb 24.  calcium 10.1 albumin 4.4.  CRP less than 3, sed rate 27.  UA showed protein 50 mg/dL.  Platelets of 2, RBC less than 1.UPCR pending.   2/20/24: He has relapsed of oral GVHD and prednisone was increased to 40 mg/d and now on tapering dose. He is also taking valtrex which has helped with HSV. In the meantime, his BP were in the 140-150 so chlorthalidone 12.5 mg was added at the end of January 2024. He also was admitted at the end of January for fever, chest pain but all work-up were unrevealing. He was treated him with Doxycycline for 14 days and now he feels better. Labs today showed Na 133, K 4.6, Bicarb 25, Cr 1.06 and BUN 40.6. CBC showed WBC 11.7, Hb 12.6. UA is bland. UPCR 0.27 g/g.   7/9/24: In the interim, he stopped chlorthalidone on 6/17/24 due to low BP and no swelling. He was hospitalized for PNA  between 5/28-6/1 and was initially given IV fluids and IV antibiotics but then transitioned to complete a 10-day course of cefdinir. Later on he has right leg pain, he presented to a local ER on June 11.  Venous Doppler US showed no DVT.  He briefly took Tylenol and ibuprofen for pain control.  Today, he has no fever but a bit of leg swelling. He wears compression stocking. He is on prednisone 10 milligram alternate 15 mg for GVHD.  Patient denies of any unexpected weight gain or weight loss, chest pain, shortness of breath, nausea, vomiting, change in urine output.  Labs today shows Cr 1.32, , K4.2, WBC 11.5, hemoglobin 11.8, UA spec gravity 1.031, urine protein albumin 70mg/dL, protein/creat urine 0.16.    1/7/2025: In the interim, the patient was admitted for PJP pneumonia and was treated with Bactrim IV for 3 weeks and now on oral Bactrim 3 times per weeks. Later on, he developed parainfluenza URI and admitted again in mid December 2024.  He was started on Jakafi on 12/13/24.  He had volume depletion with shock, BP in the 60s. He was also diagnosed with influenza A on 12/31/24 and was started on Tamiflu. Jakafi stopped on 12/27/24 due to hypotension. He is on 15 mg alternating with 10 mg daily. Today, still has some cough and weak. No fever or SOB. No problem with swelling. He has no proble with urination.   Labs today showed Cr 1.23, eGFR 65, K 4.4, CO2 23, Ca 9.6, Phos 2.9, albumin 4.0.  Hemoglobin 11.2, platelets 71.  UA showed no blood. UPCR 0.24 g/g.     4/29/25: Prednisone tapering slowly and now he is on prednisone 7.5 mg daily. Now only mouth sore seems to be the only symptoms. No recent infection. BP are ok at home but not much check it frequently. He has a bit swelling in his legs. No problem with urination. He started using CPAP since March 2025 he does not notice any change in his symptoms.  He has some lower extremity edema.  Lately he has not wear compression stocking.  Labs today show  creatinine 1.26, BUN 36.6, potassium 4.5, bicarb 25, calcium 9.7, phosphorus 2.4, Albumin 4.1.  CBC show hemoglobin 12 white blood cell 10.6 and platelet 213.  UA is completely bland. UPCR 0.21 g/g.       Past medical history  Past Medical History:   Diagnosis Date    ALL (acute lymphocytic leukemia) (H)     CKD (chronic kidney disease)     GVHD (graft versus host disease) (H)     H/O peripheral stem cell transplant (H)     Hyperthyroidism 1996    Infection due to 2019 novel coronavirus 01/16/2023    Infection due to 2019 novel coronavirus 05/18/2022    Influenza A 11/2022    Postablative hypothyroidism 1997    Pulmonary embolism (H)     Renal cell carcinoma (H) 2007    right kidney    Sleep apnea        Past surgical history  Past Surgical History:   Procedure Laterality Date    APPENDECTOMY      BACK SURGERY  2017    BONE MARROW BIOPSY, BONE SPECIMEN, NEEDLE/TROCAR Left 09/02/2021    Procedure: BIOPSY, BONE MARROW;  Surgeon: Jailyn Ny APRN CNP;  Location: [facility] OR    BONE MARROW BIOPSY, BONE SPECIMEN, NEEDLE/TROCAR Left 11/15/2021    Procedure: BIOPSY, BONE MARROW;  Surgeon: Socrates De La Torre;  Location: [facility] OR    BONE MARROW BIOPSY, BONE SPECIMEN, NEEDLE/TROCAR Left 02/07/2022    Procedure: BIOPSY, BONE MARROW;  Surgeon: Renetta Wiggins PA-C;  Location: [facility] OR    BONE MARROW BIOPSY, BONE SPECIMEN, NEEDLE/TROCAR Left 08/18/2022    Procedure: BIOPSY, BONE MARROW;  Surgeon: Lorrie Yap PA-C;  Location: [facility] OR    BONE MARROW BIOPSY, BONE SPECIMEN, NEEDLE/TROCAR Left 08/07/2023    Procedure: Bone marrow biopsy, bone specimen, needle/trocar;  Surgeon: Teressa Rick PA-C;  Location: [facility] OR    BRONCHOSCOPY (RIGID OR FLEXIBLE), DIAGNOSTIC N/A 04/29/2022    Procedure: BRONCHOSCOPY, WITH BRONCHOALVEOLAR LAVAGE;  Surgeon: Murtaza Garcia MD;  [facility]    BRONCHOSCOPY (RIGID OR FLEXIBLE), DIAGNOSTIC N/A 11/1/2024    Procedure: BRONCHOSCOPY, WITH BRONCHOALVEOLAR LAVAGE;  Surgeon:  Murtaza Garcia MD;  Location: [facility]    IR CVC TUNNEL PLACEMENT > 5 YRS OF AGE  08/04/2021    IR CVC TUNNEL REMOVAL LEFT  09/02/2021    IR LIVER BIOPSY PERCUTANEOUS  02/21/2022    IR LUMBAR PUNCTURE  1/21/2021    NEPHRECTOMY  2007    ORTHOPEDIC SURGERY      bilateral shoulder surgeries    PERCUTANEOUS BIOPSY LIVER N/A 02/21/2022    Procedure: NEEDLE BIOPSY, LIVER, PERCUTANEOUS;  Surgeon: Trace Castellanos MD;  Location: [facility] OR    PHACOEMULSIFICATION WITH STANDARD INTRAOCULAR LENS IMPLANT Left 1/15/2024    Procedure: LEFT EYE PHACOEMULSIFICATION, CATARACT, WITH STANDARD INTRAOCULAR LENS IMPLANT INSERTION;  Surgeon: Deshawn Menezes MD;  Location: [facility] OR    PHACOEMULSIFICATION WITH STANDARD INTRAOCULAR LENS IMPLANT Right 2/5/2024    Procedure: RIGHT EYE PHACOEMULSIFICATION, CATARACT, WITH STANDARD INTRAOCULAR LENS IMPLANT INSERTION;  Surgeon: Deshawn Menezes MD;  Location: [facility] OR       Review of Systems:   14 systems were reviewed and all negative except as mentioned above.   Current Medications:  Current Outpatient Medications   Medication Sig Dispense Refill    acyclovir (ZOVIRAX) 400 MG tablet Take 800 mg by mouth every 12 hours. Taking 800 mg BID      dexAMETHasone alcohol-free (DECADRON) 0.1 MG/ML solution Swish and spit 10 mLs (1 mg) in mouth 2 times daily. (Patient taking differently: Swish and spit 1 mg in mouth 2 times daily as needed.) 500 mL 2    escitalopram (LEXAPRO) 10 MG tablet Take 1 tablet (10 mg) by mouth daily 30 tablet 3    levothyroxine (SYNTHROID/LEVOTHROID) 112 MCG tablet Take 1 tablet (112 mcg) by mouth daily. 90 tablet 4    nicotine polacrilex (NICORETTE) 4 MG gum Place 4 mg inside cheek daily.      Nutritional Supplements (ENSURE COMPLETE SHAKE) LIQD Take 11 mLs by mouth every morning      omeprazole (PRILOSEC) 40 MG DR capsule Take 1 capsule (40 mg) by mouth daily 30 capsule 3    PANTOTHENIC ACID PO Take 100 mg by mouth daily.      penicillin V (VEETID) 250 MG tablet Take 1  tablet (250 mg) by mouth 2 times daily. 60 tablet 2    predniSONE (DELTASONE) 2.5 MG tablet Take 1 tablet (2.5 mg) by mouth daily. 30 tablet 1    predniSONE (DELTASONE) 5 MG tablet Use as directed for a prednisone taper 50 tablet 2    Riboflavin 100 MG TABS Take by mouth.      rosuvastatin (CRESTOR) 5 MG tablet Take 1 tablet (5 mg) by mouth daily. 30 tablet 3    sulfamethoxazole-trimethoprim (BACTRIM DS) 800-160 MG tablet Take 1 tablet by mouth Every Mon, Wed, Fri Morning.      albuterol (PROAIR HFA/PROVENTIL HFA/VENTOLIN HFA) 108 (90 Base) MCG/ACT inhaler Inhale 2 puffs into the lungs every 6 hours as needed for shortness of breath, wheezing or cough. (Patient not taking: Reported on 4/29/2025) 18 g 0    LORazepam (ATIVAN) 1 MG tablet Take 1 tablet (1 mg) by mouth every 6 hours as needed for anxiety. PRN (Patient not taking: Reported on 4/29/2025) 20 tablet 0    metroNIDAZOLE (METROCREAM) 0.75 % external cream Apply topically 2 times daily Apply to the nose. (Patient not taking: Reported on 4/29/2025) 45 g 3    polyethylene glycol (MIRALAX) 17 GM/Dose powder Take by mouth daily as needed. (Patient not taking: Reported on 4/29/2025)      predniSONE (DELTASONE) 10 MG tablet Take 1 tablet (10 mg) by mouth daily. Alternates 10 and 15 mg every other day (Patient not taking: Reported on 4/29/2025)      sennosides (SENOKOT) 8.6 MG tablet Take 1 tablet by mouth 3 times daily as needed for constipation (Patient not taking: Reported on 4/29/2025) 90 tablet 0    tacrolimus (PROTOPIC) 0.1 % external ointment Apply topically 2 times daily (Patient not taking: Reported on 4/29/2025) 30 g 1     No current facility-administered medications for this visit.     Facility-Administered Medications Ordered in Other Visits   Medication Dose Route Frequency Provider Last Rate Last Admin    saline flush for lab use ONLY  20 mL Intracatheter Once Keegan Chew MD           Physical Exam:   /80 (BP Location: Right arm, Patient  "Position: Sitting, Cuff Size: Adult Regular)   Pulse 77   Temp 98.3  F (36.8  C) (Oral)   Ht 1.854 m (6' 1\")   Wt 123.3 kg (271 lb 12.8 oz)   SpO2 95%   BMI 35.86 kg/m     Body mass index is 35.86 kg/m .  /80 (BP Location: Right arm, Patient Position: Sitting, Cuff Size: Adult Regular)   Pulse 77   Temp 98.3  F (36.8  C) (Oral)   Ht 1.854 m (6' 1\")   Wt 123.3 kg (271 lb 12.8 oz)   SpO2 95%   BMI 35.86 kg/m       GENERAL APPEARANCE: Alert, not in acute distress; pleasant. + Cushingoid appearance; slight improved  EYES:  Not pale conjunctiva, pupils equal  HENT: Mouth without ulcers or lesions  PULM: lungs clear to auscultation bilaterally, equal air movement, no clubbing  CV: regular rhythm, normal rate, no rub     -JVD no distended.      -edema: + 1+ pitting edema  GI: soft,  - tender, no distended, bowel sounds are present  INTEGUMENT: No rashes.   NEURO:  Non focal. No asterixis.     Labs:   All labs reviewed by me  Last Renal Panel:  Sodium   Date Value Ref Range Status   04/29/2025 138 135 - 145 mmol/L Final   07/08/2021 139 133 - 144 mmol/L Final     Potassium   Date Value Ref Range Status   04/29/2025 4.5 3.4 - 5.3 mmol/L Final   11/15/2022 5.0 3.4 - 5.3 mmol/L Final   07/08/2021 3.9 3.4 - 5.3 mmol/L Final     Potassium POCT   Date Value Ref Range Status   12/01/2022 4.8 3.4 - 5.3 mmol/L Final     Chloride   Date Value Ref Range Status   04/29/2025 105 98 - 107 mmol/L Final   11/15/2022 101 94 - 109 mmol/L Final   07/08/2021 108 94 - 109 mmol/L Final     Carbon Dioxide   Date Value Ref Range Status   07/08/2021 23 20 - 32 mmol/L Final     Carbon Dioxide (CO2)   Date Value Ref Range Status   04/29/2025 25 22 - 29 mmol/L Final   11/15/2022 30 20 - 32 mmol/L Final     Anion Gap   Date Value Ref Range Status   04/29/2025 8 7 - 15 mmol/L Final   11/15/2022 3 3 - 14 mmol/L Final   07/08/2021 7 3 - 14 mmol/L Final     Glucose   Date Value Ref Range Status   04/29/2025 167 (H) 70 - 99 mg/dL Final "   11/15/2022 113 (H) 70 - 99 mg/dL Final   07/08/2021 107 (H) 70 - 99 mg/dL Final     GLUCOSE BY METER POCT   Date Value Ref Range Status   11/27/2024 124 (H) 70 - 99 mg/dL Final     Urea Nitrogen   Date Value Ref Range Status   04/29/2025 36.6 (H) 8.0 - 23.0 mg/dL Final   11/15/2022 51 (H) 7 - 30 mg/dL Final   07/08/2021 24 7 - 30 mg/dL Final     Creatinine   Date Value Ref Range Status   04/29/2025 1.26 (H) 0.67 - 1.17 mg/dL Final   07/08/2021 0.94 0.66 - 1.25 mg/dL Final     GFR Estimate   Date Value Ref Range Status   04/29/2025 63 >60 mL/min/1.73m2 Final     Comment:     eGFR calculated using 2021 CKD-EPI equation.   07/08/2021 86 >60 mL/min/[1.73_m2] Final     Comment:     Non  GFR Calc  Starting 12/18/2018, serum creatinine based estimated GFR (eGFR) will be   calculated using the Chronic Kidney Disease Epidemiology Collaboration   (CKD-EPI) equation.       Calcium   Date Value Ref Range Status   04/29/2025 9.7 8.8 - 10.4 mg/dL Final   07/08/2021 8.9 8.5 - 10.1 mg/dL Final     Phosphorus   Date Value Ref Range Status   04/29/2025 2.4 (L) 2.5 - 4.5 mg/dL Final     Albumin   Date Value Ref Range Status   04/29/2025 4.1 3.5 - 5.2 g/dL Final   11/15/2022 3.6 3.4 - 5.0 g/dL Final   07/08/2021 3.7 3.4 - 5.0 g/dL Final       Imaging:  I reviewed imaging studies.     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 However, he had worsening  swelling and increased Cr to 1.39. At that time, it was concerned that he may have sirolimus toxicity. But he only had UCPR 0.39 g/g at that time. However, Sirolimus was stopped. I started him on low dose chlorthalidone and reduced dose amlodipine and his swelling improved but he developed hypotension so all blood pressure medications were discontinued.  His creatinine then came down to be stable at 1.1 with a EGFR in the 70s. He developed acute kidney injury stage I again likely secondary to vancomycin toxicity in October 2023. Cr peaked at 1.87 on 10/12/23 but later on improved and now down to 1.0-1.1. He has PNA in June 2024, his Cr were then in the range of 1.1-1.30. He has NEGRA again in Nov 2024 due to high dose bactrim, Cr up to 2.15 and another episode in Dec 2024 due to volume depletion. Now Cr is down to 1.2-1.3. UA has no blood but with  minimal proteinuria, UPCR 0.20g/g.  His blood pressure has improved and now no episode of hypotension and no recent episode of infection.  Given persistent proteinuria, at some point if he is more stable we can consider starting him on low-dose RAASi.  Of note the patient has 1+ pitting edema today which is new compared to last time.  But the patient denies any short of breath and blood pressure is 130/80.  The patient will try to wear compression stocking morning but if it does not improve they will let us know and we will consider starting him on diuretics.    - In the future, if he is stable we may consider starting him on low-dose RAASi  - Closely monitor swelling and blood pressure if it is increasing then let us know and we can start him on diuretics  - Advised him to wear compression stocking  - Stay well hydrated, goal fluid intake 60-70 OZ daily  - Low salt diet; NA 2000 mg per day  # Hx of PJP  S/p high dose bactrim and now on bactrim PPX DS 1 tab thrice weekly  # L5/S1 discitis; recurrent; resolved  Started on Ertapenem and Vanco since August 2023, plan for 6  weeks but Vanco stopped on 10/12/23 due to NEGRA  replaced with Clindamycin but then switched to Augmentin and doxycycline. Now completed Abx.  # Hypertension; well controlled  # Hx of hypotension with Jakafi?  Blood pressure started to increase after he developed NEGRA in October 2023.  It was as high as 180s sometimes.  After he resumed amlodipine 5 mg, BP improved. However, BP worsened after escalation of prednisone dose in Dec 2023 so Cholrthalidone was added since 1/24/24 with improvement in swelling and BP. Previously, he has mild hypoNa but after cholthalidone stopped in 6/17/24, hypoNa improves. Now completely off BP meds and  recently had episode of hypotension with Jakafi.  Blood pressure today is 124/78.   - Was on Amlodipine 5 mg daily but stopped since December 2024 due to hypotension  - Was on chlorthalidone 12.5 mg daily stopped since 6/17/24 due to hypotension with SBP 80-90s  - Blood pressure 132/80 but he has swelling so if this persists of blood pressure worsen may consider adding on low-dose torsemide  - Continue to avoid NSAIDs use  # Mild hypercalcemia; resolved  Calcium was mildly elevated at 10.3 and 10.1 on previous visits. Ca level today 9.7. The patient is taking calcium vitamin D 1 tabs per day. Vit D is 65 on 6/13/23. PTH is 58 on 12/21/23. 10/2024: Vit D 40 and PTH 36. Ca/P normal.   # Mild hypoNa; resolved  - resolved after Cholthalidone stopped.   # Hypogammaglobulinemia  IgG 575 on 3/12/25.  -Receives intermittent IVIG infusions for IgG levels <400    The longitudinal plan of care for CKD stage 2/3, HTN, post ASCT, GVHD was addressed during this visit. Due to the added complexity in care, I will continue to support Nik Nava in the subsequent management of this condition(s) and with the ongoing continuity of care of this condition(s).     I spent  40 minutes on the date of the encounter doing chart review, history and exam, documentation and further activities as noted above. 26  minutes of this visit is dedicated to direct patient interaction via face to face.      Follow-up in 4 months with karolyn Chew MD on 04/29/2025

## 2025-04-29 NOTE — PATIENT INSTRUCTIONS
No change in medications  2.  Eat more cheese for phosphorus  3.  Wear compression stocking and monitor swelling  4.  See you in 4 months

## 2025-04-29 NOTE — NURSING NOTE
"Chief Complaint   Patient presents with    RECHECK     Follow up. PT feels like his energy is down. He feels like he is gaining weight. He is claining he is eating more carbs at night,      Vitals:    04/29/25 1323   BP: 132/80   BP Location: Right arm   Patient Position: Sitting   Cuff Size: Adult Regular   Pulse: 77   Temp: 98.3  F (36.8  C)   TempSrc: Oral   SpO2: 95%   Weight: 123.3 kg (271 lb 12.8 oz)   Height: 1.854 m (6' 1\")       BP Readings from Last 3 Encounters:   04/29/25 132/80   04/01/25 121/71   03/25/25 130/79       /80 (BP Location: Right arm, Patient Position: Sitting, Cuff Size: Adult Regular)   Pulse 77   Temp 98.3  F (36.8  C) (Oral)   Ht 1.854 m (6' 1\")   Wt 123.3 kg (271 lb 12.8 oz)   SpO2 95%   BMI 35.86 kg/m       Christiana Mejia    "

## 2025-04-29 NOTE — NURSING NOTE
"Chief Complaint   Patient presents with    Labs Only    Port Draw     Labs drawn via port by RN.     Port accessed with 20 gauge, 3/4\" power needle by RN, labs collected, line flushed with saline and heparin, then de-accessed.     Jessika Riggs RN    "

## 2025-04-30 LAB — IGG SERPL-MCNC: 568 MG/DL (ref 610–1616)

## 2025-05-07 ENCOUNTER — MYC REFILL (OUTPATIENT)
Dept: TRANSPLANT | Facility: CLINIC | Age: 65
End: 2025-05-07
Payer: MEDICARE

## 2025-05-07 DIAGNOSIS — Z94.81 STATUS POST BONE MARROW TRANSPLANT (H): ICD-10-CM

## 2025-05-07 DIAGNOSIS — T86.09 GVHD AS COMPLICATION OF BONE MARROW TRANSPLANT (H): ICD-10-CM

## 2025-05-07 DIAGNOSIS — D89.813 GVHD AS COMPLICATION OF BONE MARROW TRANSPLANT (H): ICD-10-CM

## 2025-05-07 RX ORDER — DEXAMETHASONE 0.5 MG/5ML
1 SOLUTION ORAL 2 TIMES DAILY
Qty: 500 ML | Refills: 2 | Status: SHIPPED | OUTPATIENT
Start: 2025-05-07

## 2025-05-08 DIAGNOSIS — C91.01 ACUTE LYMPHOBLASTIC LEUKEMIA (ALL) IN REMISSION (H): ICD-10-CM

## 2025-05-08 RX ORDER — PENICILLIN V POTASSIUM 250 MG/1
250 TABLET, FILM COATED ORAL 2 TIMES DAILY
Qty: 60 TABLET | Refills: 2 | Status: SHIPPED | OUTPATIENT
Start: 2025-05-08

## 2025-05-11 ENCOUNTER — HEALTH MAINTENANCE LETTER (OUTPATIENT)
Age: 65
End: 2025-05-11

## 2025-05-12 ENCOUNTER — MYC MEDICAL ADVICE (OUTPATIENT)
Dept: NEPHROLOGY | Facility: CLINIC | Age: 65
End: 2025-05-12
Payer: MEDICARE

## 2025-05-12 DIAGNOSIS — N18.2 BENIGN HYPERTENSION WITH CKD (CHRONIC KIDNEY DISEASE), STAGE II: Primary | ICD-10-CM

## 2025-05-12 DIAGNOSIS — I12.9 BENIGN HYPERTENSION WITH CKD (CHRONIC KIDNEY DISEASE), STAGE II: Primary | ICD-10-CM

## 2025-05-12 RX ORDER — TORSEMIDE 10 MG/1
10 TABLET ORAL DAILY
Qty: 90 TABLET | Refills: 3 | Status: SHIPPED | OUTPATIENT
Start: 2025-05-12

## 2025-05-12 NOTE — TELEPHONE ENCOUNTER
Per provider    To start torsemide 10 mg daily, check BP daily for 2 weeks as well as weight and swelling. Check BMP 2 weeks after. Writer sent INFOGRAPHIQS message to patient and his wife letting them know the update. Script for Torsemide sent to pharmacy and BMP lab has been ordered.     Suma Salmeron RN, BSN   Nephrology RN Care Coordinator   [facility]

## 2025-05-13 DIAGNOSIS — T86.09 GVHD AS COMPLICATION OF BONE MARROW TRANSPLANT (H): ICD-10-CM

## 2025-05-13 DIAGNOSIS — D89.813 GVHD AS COMPLICATION OF BONE MARROW TRANSPLANT (H): ICD-10-CM

## 2025-05-13 DIAGNOSIS — Z94.81 STATUS POST BONE MARROW TRANSPLANT (H): Primary | ICD-10-CM

## 2025-05-13 NOTE — PROGRESS NOTES
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    HPI: Today feels reasonably well. Has decent energy level and have been active outside as the weather now is warmer. Has some intermittent mouth tenderness and eye irritation, but for the most part he has no active signs or symptoms of GVHD and his mouth is not bothering him. Reports weight gain of 6lbs and intermittent LE edema which has been chronic. Normal appetite and eating. He has noticed no rash, or other signs and symptoms of GVHD. He has tapered the prednisone down to 7.5 mg daily and he is tolerated this well.     ROS: 10 point Review of systems was negative except as detailed above     PHYSICAL EXAM          Blood pressure 136/80, pulse 74, temperature 97.7  F (36.5  C), temperature source Oral, resp. rate 16, weight 123.4 kg (272 lb), SpO2 96%.    Wt Readings from Last 4 Encounters:   05/14/25 123.4 kg (272 lb)   04/29/25 123.3 kg (271 lb 12.8 oz)   04/01/25 117.9 kg (260 lb)   03/12/25 119.6 kg (263 lb 11.2 oz)      KPS: 90  General: NAD.  HEENT: sclera anicteric. Lichenoid changes and associated erythema of L buccal mucosa.  No active ulceration.  Lymph nodes: No lymphadenopathy in his head neck region, upper chest, or axilla  Lungs: CTA b/l  no wheezes  CV: RRR  no gallop  Abd: Soft, no tenderness to palpation  Ext/Skin: Scatter bruises on both arms. No rashes.     Chronic GVHD          5/14/2025    13:29 3/12/2025    11:51 12/24/2024    11:48   Chronic GVHD   Has chronic gkrke-dexkvv-eami disease developed since the last entry? No No No   Is there persistence of chronic zbcuo-lnzwlg-ymqu disease since the last entry? Yes Yes Yes   Total Chronic Wnzrr-Sprkhb-Mdll Disease Score 2 2 2   Subjective Clinician Opinion of Severity Mild Mild Mild       Blood Counts     Recent Labs   Lab Test  05/14/25  1011 04/29/25  1255 03/12/25  1010   HGB 11.9* 12.0* 12.0*   HCT 34.7* 34.4* 35.5*   WBC 10.6 10.6 9.1    213 228       Chemistries     Basic Panel  Recent Labs   Lab Test 05/14/25  1011 04/29/25  1255 03/12/25  1010    138 140   POTASSIUM 4.2 4.5 4.2   CHLORIDE 102 105 105   CO2 24 25 25   BUN 45.7* 36.6* 41.0*   CR 1.45* 1.26* 1.27*   * 167* 103*      Calcium, Magnesium, Phosphorus  Recent Labs   Lab Test 05/14/25  1011 04/29/25  1255 03/12/25  1010 01/17/25  1209 01/07/25  1255 12/24/24  1038 12/20/24  0455 12/19/24  0254 12/18/24  0758   DAMON 9.6 9.7 9.8   < > 9.6   < > 9.2 8.7* 9.0   MAG  --   --   --   --   --   --  1.9 1.7 1.7   PHOS  --  2.4*  --   --  2.9  --  2.9 2.5  --     < > = values in this interval not displayed.      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     1. Acute lymphoblastic leukemia, [place] chromosome positive. S/p VERÓNICA allo sib PBSCT. Has not received TKI maintenance due to active GVHD.  Restaging at 2Y consistent with MRD negative CR. BM % donor. FISH No evidence of BCR::ABL1 fusion. CG No recipient (male) cells or cells with a t(9;22) or other clonal chromosomal abnormality were detected among the 20 metaphases analyzed.    - BCR::ABL monitoring quarterly     2. HEME:   Counts recovered, transfusion independent.     Anemia of chronic disease  Fluctuated during prior infection. Periodically monitor ferritin.     3. GVHD  Ocular GVHD - NIH score 1  Follows with ophthalmology.  - Currently using scleral lenses.      Oral GVHD - NIH score 1  Lichenoid change of L buccal mucosa. Mild dry mouth not interfering with PO intake.  - Dexamethasone  s/s BID, taper as tolerated    Skin GVHD- history of biopsy proven GVHD of the skin 8/30/2021; resolved.   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      Systemic treatment for GVHD  Corticosteroids - On steroid chronically due to frequent flare of GVH with taper. Had GVHD flare while tapered down to 10 mg daily, so currently on a very slow taper course.  - Able to tolerate taper to prednisone 7.5 mg daily without signs of GVH flare. We will switch to equivalent hydrocortisone (30 mg daily, splitting into 20 mg in AM and 10 mg midday). Continue for the next 2 months, will consider further taper at next visit.      GVHD history  cGVHD therapy started on February 24 2022  - Baseline NIH scoring 2/24/2022: Skin 2 maculopapular rash/erythema with no sclerotic features, mouth score 1 mild symptoms with lichenoid changes less than 25%, eyes score 2 moderate symptoms without new visual impairments due to KCS requiring lubricant eyedrops more than 3 times a day, overall mild scale for her provider     Previous therapies  Tacrolimus - Experienced mild NEGRA, hyperkalemia, hypomagnesemia, and reports some tremors and cramps. Switched to sirolimus Sep 2022.  Sirolimus - Discontinued Oct 2022 due to GVH flare and significant fluid retention and proteinuria.  Belumosudil - Patient intolerant/with disease flares on tacrolimus and sirolimus as above. This was started on Oct 2022 - skin/liver/oral GVHD inactive, and occular symptoms controlled/symptomatic improvement. Discontinued Oct 2023 due to recurrent infections.  Ruxolitinib - Started in Dec 2024 in an attempt to taper him off of steroid. However, was feeling unwell (coincide with viral infection), decision was made to discontinue.     4. ID  PJP pneumonia  Recent history of persistent fever with patchy peribronchovascular groundglass opacities on CT (10/21/2024). Extensive work-up revealed PJP positive from BAL (11/1/2024). Completed high-dose bactrim for 21 days per ID  recommendation     Rhinovirus infection  Parainfluenza infection  Tested positive for rhinovirus on 11/18/2024 after he developed fatigue, malaise, URI symptoms. Also had parainfluenza URI in Dec 2024.     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 Up to date for influenza, COVID, and RSV vaccines.  Complete pneumococcal, HiB, and meningococcal vaccination.  - CD4, IgG, Rubella IgG with next lab draw to guide MMR vaccinations     5. 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  Baseline Cr around 1.2-1.3. Cr slightly elevated today, will continue to monitor  - Torsemide 10 mg daily per nephrology recommendations     6. Endo:   - Hx of graves disease; On synthroid follows with endocrine  - HLD; On rosuvastatin     7. Psych:   - Situational anxiety; Lexapro 10mg daily.      Plan:  - Switch to hydrocortisone (30 mg daily, splitting into 20 mg in AM and 10 mg midday)  - CD4, IgG, Rubella IgG with next lab draw  - Booster DTaP, IPV, HBV, Hib, and Pneumococcal vaccines today  - Continue acyclovir, PenVK  - RTC 2 month for GVHD review and to continue taper     Patient was seen and plan of care was discussed with attending physician Dr. Cote.    Rohan Joshi MD  Hematology/Medical Oncology/BMT (PGY-6)

## 2025-05-14 ENCOUNTER — ONCOLOGY VISIT (OUTPATIENT)
Dept: TRANSPLANT | Facility: CLINIC | Age: 65
End: 2025-05-14
Attending: INTERNAL MEDICINE
Payer: MEDICARE

## 2025-05-14 VITALS
HEART RATE: 74 BPM | RESPIRATION RATE: 16 BRPM | WEIGHT: 272 LBS | BODY MASS INDEX: 35.89 KG/M2 | DIASTOLIC BLOOD PRESSURE: 80 MMHG | TEMPERATURE: 97.7 F | OXYGEN SATURATION: 96 % | SYSTOLIC BLOOD PRESSURE: 136 MMHG

## 2025-05-14 DIAGNOSIS — C91.01 ACUTE LYMPHOBLASTIC LEUKEMIA (ALL) IN REMISSION (H): Primary | ICD-10-CM

## 2025-05-14 DIAGNOSIS — D89.813 GVHD AS COMPLICATION OF BONE MARROW TRANSPLANT (H): ICD-10-CM

## 2025-05-14 DIAGNOSIS — Z94.81 STATUS POST BONE MARROW TRANSPLANT (H): ICD-10-CM

## 2025-05-14 DIAGNOSIS — T86.09 GVHD AS COMPLICATION OF BONE MARROW TRANSPLANT (H): ICD-10-CM

## 2025-05-14 LAB
ALBUMIN SERPL BCG-MCNC: 4.3 G/DL (ref 3.5–5.2)
ALP SERPL-CCNC: 90 U/L (ref 40–150)
ALT SERPL W P-5'-P-CCNC: 31 U/L (ref 0–70)
ANION GAP SERPL CALCULATED.3IONS-SCNC: 12 MMOL/L (ref 7–15)
AST SERPL W P-5'-P-CCNC: 39 U/L (ref 0–45)
BASOPHILS # BLD AUTO: 0 10E3/UL (ref 0–0.2)
BASOPHILS NFR BLD AUTO: 0 %
BILIRUB SERPL-MCNC: 0.5 MG/DL
BUN SERPL-MCNC: 45.7 MG/DL (ref 8–23)
CALCIUM SERPL-MCNC: 9.6 MG/DL (ref 8.8–10.4)
CD3 CELLS # BLD: 2512 CELLS/UL (ref 603–2990)
CD3 CELLS NFR BLD: 84 % (ref 49–84)
CD3+CD4+ CELLS # BLD: 877 CELLS/UL (ref 441–2156)
CD3+CD4+ CELLS NFR BLD: 29 % (ref 28–63)
CD3+CD4+ CELLS/CD3+CD8+ CLL BLD: 0.52 % (ref 1.4–2.6)
CD3+CD8+ CELLS # BLD: 1687 CELLS/UL (ref 125–1312)
CD3+CD8+ CELLS NFR BLD: 57 % (ref 10–40)
CHLORIDE SERPL-SCNC: 102 MMOL/L (ref 98–107)
CREAT SERPL-MCNC: 1.45 MG/DL (ref 0.67–1.17)
EGFRCR SERPLBLD CKD-EPI 2021: 53 ML/MIN/1.73M2
EOSINOPHIL # BLD AUTO: 0.1 10E3/UL (ref 0–0.7)
EOSINOPHIL NFR BLD AUTO: 1 %
ERYTHROCYTE [DISTWIDTH] IN BLOOD BY AUTOMATED COUNT: 15.7 % (ref 10–15)
GLUCOSE SERPL-MCNC: 103 MG/DL (ref 70–99)
HCO3 SERPL-SCNC: 24 MMOL/L (ref 22–29)
HCT VFR BLD AUTO: 34.7 % (ref 40–53)
HGB BLD-MCNC: 11.9 G/DL (ref 13.3–17.7)
IMM GRANULOCYTES # BLD: 0 10E3/UL
IMM GRANULOCYTES NFR BLD: 0 %
LYMPHOCYTES # BLD AUTO: 2.9 10E3/UL (ref 0.8–5.3)
LYMPHOCYTES NFR BLD AUTO: 27 %
MCH RBC QN AUTO: 33.6 PG (ref 26.5–33)
MCHC RBC AUTO-ENTMCNC: 34.3 G/DL (ref 31.5–36.5)
MCV RBC AUTO: 98 FL (ref 78–100)
MONOCYTES # BLD AUTO: 1 10E3/UL (ref 0–1.3)
MONOCYTES NFR BLD AUTO: 10 %
NEUTROPHILS # BLD AUTO: 6.6 10E3/UL (ref 1.6–8.3)
NEUTROPHILS NFR BLD AUTO: 62 %
NRBC # BLD AUTO: 0 10E3/UL
NRBC BLD AUTO-RTO: 0 /100
PLATELET # BLD AUTO: 210 10E3/UL (ref 150–450)
POTASSIUM SERPL-SCNC: 4.2 MMOL/L (ref 3.4–5.3)
PROT SERPL-MCNC: 6.9 G/DL (ref 6.4–8.3)
RBC # BLD AUTO: 3.54 10E6/UL (ref 4.4–5.9)
SODIUM SERPL-SCNC: 138 MMOL/L (ref 135–145)
T CELL COMMENT: ABNORMAL
WBC # BLD AUTO: 10.6 10E3/UL (ref 4–11)

## 2025-05-14 PROCEDURE — G0463 HOSPITAL OUTPT CLINIC VISIT: HCPCS | Performed by: INTERNAL MEDICINE

## 2025-05-14 PROCEDURE — 85025 COMPLETE CBC W/AUTO DIFF WBC: CPT | Performed by: INTERNAL MEDICINE

## 2025-05-14 PROCEDURE — G0009 ADMIN PNEUMOCOCCAL VACCINE: HCPCS | Performed by: INTERNAL MEDICINE

## 2025-05-14 PROCEDURE — 90472 IMMUNIZATION ADMIN EACH ADD: CPT | Performed by: INTERNAL MEDICINE

## 2025-05-14 PROCEDURE — 90677 PCV20 VACCINE IM: CPT | Performed by: INTERNAL MEDICINE

## 2025-05-14 PROCEDURE — 86359 T CELLS TOTAL COUNT: CPT | Performed by: INTERNAL MEDICINE

## 2025-05-14 PROCEDURE — 99214 OFFICE O/P EST MOD 30 MIN: CPT | Mod: GC | Performed by: INTERNAL MEDICINE

## 2025-05-14 PROCEDURE — 36591 DRAW BLOOD OFF VENOUS DEVICE: CPT | Performed by: INTERNAL MEDICINE

## 2025-05-14 PROCEDURE — 90697 DTAP-IPV-HIB-HEPB VACCINE IM: CPT | Performed by: INTERNAL MEDICINE

## 2025-05-14 PROCEDURE — 250N000021 HC RX MED A9270 GY (STAT IND- M) 250: Performed by: INTERNAL MEDICINE

## 2025-05-14 PROCEDURE — 90471 IMMUNIZATION ADMIN: CPT | Performed by: INTERNAL MEDICINE

## 2025-05-14 PROCEDURE — 80053 COMPREHEN METABOLIC PANEL: CPT | Performed by: INTERNAL MEDICINE

## 2025-05-14 PROCEDURE — 250N000011 HC RX IP 250 OP 636: Performed by: INTERNAL MEDICINE

## 2025-05-14 RX ORDER — HEPARIN SODIUM (PORCINE) LOCK FLUSH IV SOLN 100 UNIT/ML 100 UNIT/ML
5 SOLUTION INTRAVENOUS ONCE
Status: COMPLETED | OUTPATIENT
Start: 2025-05-14 | End: 2025-05-14

## 2025-05-14 RX ORDER — HYDROCORTISONE 10 MG/1
TABLET ORAL
Qty: 180 TABLET | Refills: 1 | Status: SHIPPED | OUTPATIENT
Start: 2025-05-14

## 2025-05-14 RX ADMIN — PNEUMOCOCCAL 20-VALENT CONJUGATE VACCINE 0.5 ML
2.2; 2.2; 2.2; 2.2; 2.2; 2.2; 2.2; 2.2; 2.2; 2.2; 2.2; 2.2; 2.2; 2.2; 2.2; 2.2; 4.4; 2.2; 2.2; 2.2 INJECTION, SUSPENSION INTRAMUSCULAR at 11:56

## 2025-05-14 RX ADMIN — Medication 5 ML: at 10:12

## 2025-05-14 RX ADMIN — DIPHTHERIA AND TETANUS TOXOIDS AND ACELLULAR PERTUSSIS, INACTIVATED POLIOVIRUS, HAEMOPHILUS B CONJUGATE AND HEPATITIS B VACCINE 0.5 ML: 15; 5; 20; 20; 3; 5; 29; 7; 26; 10; 3 INJECTION, SUSPENSION INTRAMUSCULAR at 11:55

## 2025-05-14 ASSESSMENT — PAIN SCALES - GENERAL: PAINLEVEL_OUTOF10: NO PAIN (0)

## 2025-05-14 NOTE — NURSING NOTE
"Oncology Rooming Note    May 14, 2025 10:24 AM   Nik Nava is a 65 year old male who presents for:    Chief Complaint   Patient presents with    Blood Draw     Port blood draw by lab RN    Oncology Clinic Visit     Acute myeloid leukemia      Initial Vitals: /80   Pulse 74   Temp 97.7  F (36.5  C) (Oral)   Resp 16   Wt 123.4 kg (272 lb)   SpO2 96%   BMI 35.89 kg/m   Estimated body mass index is 35.89 kg/m  as calculated from the following:    Height as of 4/29/25: 1.854 m (6' 1\").    Weight as of this encounter: 123.4 kg (272 lb). Body surface area is 2.52 meters squared.  No Pain (0) Comment: Data Unavailable   No LMP for male patient.  Allergies reviewed: Yes  Medications reviewed: Yes    Medications: Medication refills not needed today.  Pharmacy name entered into VtagO:    [facility] - [place], [place] - [address] [facility] [place], [place] - [address] [place] [address] [id number]  [facility] - [place], [place] - [address] [place] [address]/[facility] #[id number] - [place], [place] - [address] [facility] [address] [place] [facility]    Frailty Screening:   Is the patient here for a new oncology consult visit in cancer care? 2. No    PHQ9:  Did this patient require a PHQ9?: No      Clinical concerns: Weight gain; has been having high blood pressures when monitoring at home around 150s/90s just started torsemide       Susanne Tyson              "

## 2025-05-14 NOTE — LETTER
5/14/2025      Nik Nava  [address] [id number]      Dear Colleague,    Thank you for referring your patient, Nik Nava, to the [facility] [place]. Please see a copy of my visit note below.    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    HPI: Today feels reasonably well. Has decent energy level and have been active outside as the weather now is warmer. Has some intermittent mouth tenderness and eye irritation, but for the most part he has no active signs or symptoms of GVHD and his mouth is not bothering him. Reports weight gain of 6lbs and intermittent LE edema which has been chronic. Normal appetite and eating. He has noticed no rash, or other signs and symptoms of GVHD. He has tapered the prednisone down to 7.5 mg daily and he is tolerated this well.     ROS: 10 point Review of systems was negative except as detailed above     PHYSICAL EXAM          Blood pressure 136/80, pulse 74, temperature 97.7  F (36.5  C), temperature source Oral, resp. rate 16, weight 123.4 kg (272 lb), SpO2 96%.    Wt Readings from Last 4 Encounters:   05/14/25 123.4 kg (272 lb)   04/29/25 123.3 kg (271 lb 12.8 oz)   04/01/25 117.9 kg (260 lb)   03/12/25 119.6 kg (263 lb 11.2 oz)      KPS: 90  General: NAD.  HEENT: sclera anicteric. Lichenoid changes and associated erythema of L buccal mucosa.  No active ulceration.  Lymph nodes: No lymphadenopathy in his head neck region, upper chest, or axilla  Lungs: CTA b/l  no wheezes  CV: RRR  no gallop  Abd: Soft, no tenderness to palpation  Ext/Skin: Scatter bruises on both arms. No rashes.     Chronic GVHD          5/14/2025    13:29 3/12/2025    11:51 12/24/2024    11:48   Chronic GVHD   Has chronic xrved-ybgbkg-fwte disease developed since the last entry? No  No No   Is there persistence of chronic jnuwx-fklqyl-bhwh disease since the last entry? Yes Yes Yes   Total Chronic Ajooa-Fanqey-Qroi Disease Score 2 2 2   Subjective Clinician Opinion of Severity Mild Mild Mild       Blood Counts     Recent Labs   Lab Test 05/14/25  1011 04/29/25  1255 03/12/25  1010   HGB 11.9* 12.0* 12.0*   HCT 34.7* 34.4* 35.5*   WBC 10.6 10.6 9.1    213 228       Chemistries     Basic Panel  Recent Labs   Lab Test 05/14/25  1011 04/29/25  1255 03/12/25  1010    138 140   POTASSIUM 4.2 4.5 4.2   CHLORIDE 102 105 105   CO2 24 25 25   BUN 45.7* 36.6* 41.0*   CR 1.45* 1.26* 1.27*   * 167* 103*      Calcium, Magnesium, Phosphorus  Recent Labs   Lab Test 05/14/25  1011 04/29/25  1255 03/12/25  1010 01/17/25  1209 01/07/25  1255 12/24/24  1038 12/20/24  0455 12/19/24  0254 12/18/24  0758   DAMON 9.6 9.7 9.8   < > 9.6   < > 9.2 8.7* 9.0   MAG  --   --   --   --   --   --  1.9 1.7 1.7   PHOS  --  2.4*  --   --  2.9  --  2.9 2.5  --     < > = values in this interval not displayed.      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     1. Acute lymphoblastic leukemia, [place] chromosome positive. S/p VERÓNICA allo sib PBSCT. Has not received TKI maintenance due to active GVHD.  Restaging at 2Y consistent with MRD negative CR. BM % donor. FISH No evidence of BCR::ABL1 fusion. CG No recipient (male) cells or cells with a t(9;22) or other clonal chromosomal abnormality were detected among the 20 metaphases analyzed.    - BCR::ABL monitoring quarterly     2. HEME:   Counts recovered, transfusion independent.     Anemia of chronic disease  Fluctuated during prior infection.  Periodically monitor ferritin.     3. GVHD  Ocular GVHD - NIH score 1  Follows with ophthalmology.  - Currently using scleral lenses.      Oral GVHD - NIH score 1  Lichenoid change of L buccal mucosa. Mild dry mouth not interfering with PO intake.  - Dexamethasone s/s BID, taper as tolerated    Skin GVHD- history of biopsy proven GVHD of the skin 8/30/2021; resolved.   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      Systemic treatment for GVHD  Corticosteroids - On steroid chronically due to frequent flare of GVH with taper. Had GVHD flare while tapered down to 10 mg daily, so currently on a very slow taper course.  - Able to tolerate taper to prednisone 7.5 mg daily without signs of GVH flare. We will switch to equivalent hydrocortisone (30 mg daily, splitting into 20 mg in AM and 10 mg midday). Continue for the next 2 months, will consider further taper at next visit.      GVHD history  cGVHD therapy started on February 24 2022  - Baseline NIH scoring 2/24/2022: Skin 2 maculopapular rash/erythema with no sclerotic features, mouth score 1 mild symptoms with lichenoid changes less than 25%, eyes score 2 moderate symptoms without new visual impairments due to KCS requiring lubricant eyedrops more than 3 times a day, overall mild scale for her provider     Previous therapies  Tacrolimus - Experienced mild NEGRA, hyperkalemia, hypomagnesemia, and reports some tremors and cramps. Switched to sirolimus Sep 2022.  Sirolimus - Discontinued Oct 2022 due to GVH flare and significant fluid retention and proteinuria.  Belumosudil - Patient intolerant/with disease flares on tacrolimus and sirolimus as above. This was started on Oct 2022 - skin/liver/oral GVHD inactive, and occular symptoms controlled/symptomatic improvement. Discontinued Oct 2023 due to recurrent infections.  Ruxolitinib - Started in Dec 2024 in an attempt to taper him off of steroid. However, was feeling unwell (coincide with viral infection), decision was  made to discontinue.     4. ID  PJP pneumonia  Recent history of persistent fever with patchy peribronchovascular groundglass opacities on CT (10/21/2024). Extensive work-up revealed PJP positive from BAL (11/1/2024). Completed high-dose bactrim for 21 days per ID recommendation     Rhinovirus infection  Parainfluenza infection  Tested positive for rhinovirus on 11/18/2024 after he developed fatigue, malaise, URI symptoms. Also had parainfluenza URI in Dec 2024.     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 Up to date for influenza, COVID, and RSV vaccines.  Complete pneumococcal, HiB, and meningococcal vaccination.  - CD4, IgG, Rubella IgG with next lab draw to guide MMR vaccinations     5. 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  Baseline Cr around 1.2-1.3. Cr slightly elevated today, will continue to monitor  - Torsemide 10 mg daily per nephrology recommendations     6. Endo:   - Hx of graves disease; On synthroid follows with endocrine  - HLD; On rosuvastatin     7. Psych:   - Situational anxiety; Lexapro 10mg daily.      Plan:  - Switch to hydrocortisone (30 mg daily, splitting into 20 mg in AM and 10 mg midday)  - CD4, IgG, Rubella IgG with next lab draw  - Booster DTaP, IPV, HBV, Hib, and Pneumococcal vaccines today  - Continue acyclovir, PenVK  - RTC 2 month for GVHD review and to continue taper     Patient was seen and plan of care was discussed with attending physician Dr. Cote.    Rohan Joshi MD  Hematology/Medical Oncology/BMT (PGY-6)      Attestation signed by Chris Cote MD at 5/19/2025  7:56 AM  (Updated):  Attending Note:   I saw this patient with Dr. Joshi and agree with the findings and plan of care as documented in the note above. I personally reviewed the history, salient aspects of the exam, laboratory and imaging as needed.  The key findings are he is doing well with a slow pred taper.  Will continue after transition to H.cortisone.    DOMINIQUE BRITTON MD  May 14, 2025                Again, thank you for allowing me to participate in the care of your patient.        Sincerely,        DOMINIQUE BRITTON MD    Electronically signed

## 2025-05-20 ENCOUNTER — MYC MEDICAL ADVICE (OUTPATIENT)
Dept: TRANSPLANT | Facility: CLINIC | Age: 65
End: 2025-05-20

## 2025-05-21 ENCOUNTER — LAB (OUTPATIENT)
Dept: LAB | Facility: CLINIC | Age: 65
End: 2025-05-21
Attending: INTERNAL MEDICINE
Payer: MEDICARE

## 2025-05-21 ENCOUNTER — ALLIED HEALTH/NURSE VISIT (OUTPATIENT)
Dept: TRANSPLANT | Facility: CLINIC | Age: 65
End: 2025-05-21
Attending: INTERNAL MEDICINE
Payer: MEDICARE

## 2025-05-21 ENCOUNTER — MYC REFILL (OUTPATIENT)
Dept: ENDOCRINOLOGY | Facility: CLINIC | Age: 65
End: 2025-05-21
Payer: MEDICARE

## 2025-05-21 ENCOUNTER — ANCILLARY PROCEDURE (OUTPATIENT)
Dept: GENERAL RADIOLOGY | Facility: CLINIC | Age: 65
End: 2025-05-21
Attending: NURSE PRACTITIONER
Payer: MEDICARE

## 2025-05-21 ENCOUNTER — TELEPHONE (OUTPATIENT)
Dept: TRANSPLANT | Facility: CLINIC | Age: 65
End: 2025-05-21
Payer: MEDICARE

## 2025-05-21 DIAGNOSIS — C91.01 ACUTE LYMPHOBLASTIC LEUKEMIA (ALL) IN REMISSION (H): ICD-10-CM

## 2025-05-21 DIAGNOSIS — I12.9 BENIGN HYPERTENSION WITH CKD (CHRONIC KIDNEY DISEASE), STAGE II: ICD-10-CM

## 2025-05-21 DIAGNOSIS — T86.09 GVHD AS COMPLICATION OF BONE MARROW TRANSPLANT (H): ICD-10-CM

## 2025-05-21 DIAGNOSIS — D89.813 GVHD AS COMPLICATION OF BONE MARROW TRANSPLANT (H): ICD-10-CM

## 2025-05-21 DIAGNOSIS — R50.9 FEVER, UNSPECIFIED: ICD-10-CM

## 2025-05-21 DIAGNOSIS — Z94.81 STATUS POST BONE MARROW TRANSPLANT (H): ICD-10-CM

## 2025-05-21 DIAGNOSIS — N18.2 BENIGN HYPERTENSION WITH CKD (CHRONIC KIDNEY DISEASE), STAGE II: ICD-10-CM

## 2025-05-21 DIAGNOSIS — Z94.81 STATUS POST BONE MARROW TRANSPLANT (H): Primary | ICD-10-CM

## 2025-05-21 DIAGNOSIS — N18.2 CKD (CHRONIC KIDNEY DISEASE) STAGE 2, GFR 60-89 ML/MIN: ICD-10-CM

## 2025-05-21 DIAGNOSIS — Z86.39 HISTORY OF GRAVES' DISEASE: ICD-10-CM

## 2025-05-21 DIAGNOSIS — E89.0 POSTABLATIVE HYPOTHYROIDISM: ICD-10-CM

## 2025-05-21 DIAGNOSIS — Z79.69 NEED FOR PROPHYLACTIC CHEMOTHERAPY: ICD-10-CM

## 2025-05-21 LAB
ALBUMIN SERPL BCG-MCNC: 4.1 G/DL (ref 3.5–5.2)
ALP SERPL-CCNC: 84 U/L (ref 40–150)
ALT SERPL W P-5'-P-CCNC: 26 U/L (ref 0–70)
ANION GAP SERPL CALCULATED.3IONS-SCNC: 12 MMOL/L (ref 7–15)
AST SERPL W P-5'-P-CCNC: 33 U/L (ref 0–45)
BASOPHILS # BLD AUTO: 0 10E3/UL (ref 0–0.2)
BASOPHILS NFR BLD AUTO: 0 %
BILIRUB SERPL-MCNC: 0.6 MG/DL
BUN SERPL-MCNC: 41.3 MG/DL (ref 8–23)
C PNEUM DNA SPEC QL NAA+PROBE: NOT DETECTED
CALCIUM SERPL-MCNC: 9.9 MG/DL (ref 8.8–10.4)
CHLORIDE SERPL-SCNC: 101 MMOL/L (ref 98–107)
CREAT SERPL-MCNC: 1.68 MG/DL (ref 0.67–1.17)
DEPRECATED S PYO AG THROAT QL EIA: NEGATIVE
EGFRCR SERPLBLD CKD-EPI 2021: 44 ML/MIN/1.73M2
EOSINOPHIL # BLD AUTO: 0.1 10E3/UL (ref 0–0.7)
EOSINOPHIL NFR BLD AUTO: 1 %
ERYTHROCYTE [DISTWIDTH] IN BLOOD BY AUTOMATED COUNT: 15.9 % (ref 10–15)
FLUAV H1 2009 PAND RNA SPEC QL NAA+PROBE: NOT DETECTED
FLUAV H1 RNA SPEC QL NAA+PROBE: NOT DETECTED
FLUAV H3 RNA SPEC QL NAA+PROBE: NOT DETECTED
FLUAV RNA SPEC QL NAA+PROBE: NEGATIVE
FLUAV RNA SPEC QL NAA+PROBE: NOT DETECTED
FLUBV RNA RESP QL NAA+PROBE: NEGATIVE
FLUBV RNA SPEC QL NAA+PROBE: NOT DETECTED
GLUCOSE SERPL-MCNC: 117 MG/DL (ref 70–99)
HADV DNA SPEC QL NAA+PROBE: NOT DETECTED
HCO3 SERPL-SCNC: 24 MMOL/L (ref 22–29)
HCOV PNL SPEC NAA+PROBE: NOT DETECTED
HCT VFR BLD AUTO: 37 % (ref 40–53)
HGB BLD-MCNC: 12.7 G/DL (ref 13.3–17.7)
HMPV RNA SPEC QL NAA+PROBE: NOT DETECTED
HPIV1 RNA SPEC QL NAA+PROBE: NOT DETECTED
HPIV2 RNA SPEC QL NAA+PROBE: NOT DETECTED
HPIV3 RNA SPEC QL NAA+PROBE: NOT DETECTED
HPIV4 RNA SPEC QL NAA+PROBE: NOT DETECTED
IMM GRANULOCYTES # BLD: 0.1 10E3/UL
IMM GRANULOCYTES NFR BLD: 0 %
LYMPHOCYTES # BLD AUTO: 3.1 10E3/UL (ref 0.8–5.3)
LYMPHOCYTES NFR BLD AUTO: 28 %
M PNEUMO DNA SPEC QL NAA+PROBE: NOT DETECTED
MCH RBC QN AUTO: 33.7 PG (ref 26.5–33)
MCHC RBC AUTO-ENTMCNC: 34.3 G/DL (ref 31.5–36.5)
MCV RBC AUTO: 98 FL (ref 78–100)
MONOCYTES # BLD AUTO: 1.1 10E3/UL (ref 0–1.3)
MONOCYTES NFR BLD AUTO: 10 %
NEUTROPHILS # BLD AUTO: 6.7 10E3/UL (ref 1.6–8.3)
NEUTROPHILS NFR BLD AUTO: 60 %
NRBC # BLD AUTO: 0 10E3/UL
NRBC BLD AUTO-RTO: 0 /100
PHOSPHATE SERPL-MCNC: 3.2 MG/DL (ref 2.5–4.5)
PLATELET # BLD AUTO: 203 10E3/UL (ref 150–450)
POTASSIUM SERPL-SCNC: 4.7 MMOL/L (ref 3.4–5.3)
PROT SERPL-MCNC: 6.9 G/DL (ref 6.4–8.3)
RBC # BLD AUTO: 3.77 10E6/UL (ref 4.4–5.9)
RSV RNA SPEC NAA+PROBE: NEGATIVE
RSV RNA SPEC QL NAA+PROBE: NOT DETECTED
RSV RNA SPEC QL NAA+PROBE: NOT DETECTED
RV+EV RNA SPEC QL NAA+PROBE: DETECTED
S PYO DNA THROAT QL NAA+PROBE: NOT DETECTED
SARS-COV-2 RNA RESP QL NAA+PROBE: NEGATIVE
SODIUM SERPL-SCNC: 137 MMOL/L (ref 135–145)
WBC # BLD AUTO: 11.2 10E3/UL (ref 4–11)

## 2025-05-21 PROCEDURE — 84100 ASSAY OF PHOSPHORUS: CPT

## 2025-05-21 PROCEDURE — 86765 RUBEOLA ANTIBODY: CPT

## 2025-05-21 PROCEDURE — 87637 SARSCOV2&INF A&B&RSV AMP PRB: CPT

## 2025-05-21 PROCEDURE — 82565 ASSAY OF CREATININE: CPT

## 2025-05-21 PROCEDURE — 82784 ASSAY IGA/IGD/IGG/IGM EACH: CPT

## 2025-05-21 PROCEDURE — 87633 RESP VIRUS 12-25 TARGETS: CPT

## 2025-05-21 PROCEDURE — 87651 STREP A DNA AMP PROBE: CPT

## 2025-05-21 PROCEDURE — 250N000011 HC RX IP 250 OP 636: Performed by: INTERNAL MEDICINE

## 2025-05-21 PROCEDURE — 85004 AUTOMATED DIFF WBC COUNT: CPT

## 2025-05-21 PROCEDURE — 71046 X-RAY EXAM CHEST 2 VIEWS: CPT | Mod: GC | Performed by: RADIOLOGY

## 2025-05-21 PROCEDURE — 36591 DRAW BLOOD OFF VENOUS DEVICE: CPT

## 2025-05-21 RX ORDER — HEPARIN SODIUM (PORCINE) LOCK FLUSH IV SOLN 100 UNIT/ML 100 UNIT/ML
5 SOLUTION INTRAVENOUS ONCE
Status: COMPLETED | OUTPATIENT
Start: 2025-05-21 | End: 2025-05-21

## 2025-05-21 RX ORDER — LEVOTHYROXINE SODIUM 112 UG/1
112 TABLET ORAL DAILY
Qty: 90 TABLET | Refills: 4 | Status: CANCELLED | OUTPATIENT
Start: 2025-05-21

## 2025-05-21 RX ADMIN — Medication 5 ML: at 14:46

## 2025-05-21 NOTE — NURSING NOTE
Strep swab was performed and sent for laboratory testing, results will be communicated with the patient.    Lauren Sommer CMA ([facility])

## 2025-05-21 NOTE — NURSING NOTE
"Chief Complaint   Patient presents with    Port Draw     Labs drawn via port by RN. Port accessed with 20g 3/4\" power needle and de-accessed. Flushed with saline and heparin. Pt tolerated well.      RSV swab collected as well.    Pat Larios RN    "

## 2025-05-21 NOTE — TELEPHONE ENCOUNTER
BMT Nurse Triage - Generic/Other Symptoms     Treating Provider:   Kera    Date of last office visit: 5/14    Onset of symptoms: Monday morning     Brief description of symptoms: Patient called in to report cough and sore throat. Had a fever last night of 101.4. Took Tylenol at 2:00am for a headache. Temperature this morning was 99.7. His O2 sat was 89% when he was sitting and then increased to 96% when he moved around. Discussed with GIRISH. Will plan for patient to come to clinic today for respiratory swab and chest xray. Depending on results, patient can see GIRISH tomorrow.     Called patient and wife. They are agreeable to the plan. Scheduling request sent to Kaseya. Scheduled for this afternoon.

## 2025-05-22 ENCOUNTER — MYC MEDICAL ADVICE (OUTPATIENT)
Dept: NEPHROLOGY | Facility: CLINIC | Age: 65
End: 2025-05-22
Payer: MEDICARE

## 2025-05-22 DIAGNOSIS — N18.2 CKD (CHRONIC KIDNEY DISEASE) STAGE 2, GFR 60-89 ML/MIN: Primary | ICD-10-CM

## 2025-05-22 LAB
IGG SERPL-MCNC: 561 MG/DL (ref 610–1616)
MEV IGG SER IA-ACNC: 5.7 AU/ML
MEV IGG SER IA-ACNC: NORMAL

## 2025-05-23 PROCEDURE — 88304 TISSUE EXAM BY PATHOLOGIST: CPT | Mod: TC | Performed by: PHYSICIAN ASSISTANT

## 2025-05-27 NOTE — TELEPHONE ENCOUNTER
[facility] Call Center    Phone Message    May a detailed message be left on voicemail: yes     Reason for Call: Other: pt wondering about doing labs, just had them done last week, please reach out to him     Action Taken: Other: neph    Travel Screening: Not Applicable     Date of Service:

## 2025-05-28 ENCOUNTER — INFUSION THERAPY VISIT (OUTPATIENT)
Dept: INFUSION THERAPY | Facility: CLINIC | Age: 65
End: 2025-05-28
Attending: NURSE PRACTITIONER
Payer: MEDICARE

## 2025-05-28 DIAGNOSIS — C91.01 ACUTE LYMPHOBLASTIC LEUKEMIA (ALL) IN REMISSION (H): ICD-10-CM

## 2025-05-28 DIAGNOSIS — Z94.81 STATUS POST BONE MARROW TRANSPLANT (H): ICD-10-CM

## 2025-05-28 DIAGNOSIS — N18.2 CKD (CHRONIC KIDNEY DISEASE) STAGE 2, GFR 60-89 ML/MIN: ICD-10-CM

## 2025-05-28 DIAGNOSIS — Z94.81 BONE MARROW TRANSPLANT STATUS (H): ICD-10-CM

## 2025-05-28 LAB
ALBUMIN MFR UR ELPH: 8.5 MG/DL
ALBUMIN SERPL BCG-MCNC: 4.3 G/DL (ref 3.5–5.2)
ALBUMIN UR-MCNC: NEGATIVE MG/DL
ANION GAP SERPL CALCULATED.3IONS-SCNC: 12 MMOL/L (ref 7–15)
APPEARANCE UR: CLEAR
BACTERIA #/AREA URNS HPF: ABNORMAL /HPF
BILIRUB UR QL STRIP: NEGATIVE
BUN SERPL-MCNC: 47.8 MG/DL (ref 8–23)
CALCIUM SERPL-MCNC: 10.4 MG/DL (ref 8.8–10.4)
CHLORIDE SERPL-SCNC: 102 MMOL/L (ref 98–107)
COLOR UR AUTO: NORMAL
CREAT SERPL-MCNC: 1.38 MG/DL (ref 0.67–1.17)
CREAT UR-MCNC: 38.2 MG/DL
EGFRCR SERPLBLD CKD-EPI 2021: 56 ML/MIN/1.73M2
GLUCOSE SERPL-MCNC: 93 MG/DL (ref 70–99)
GLUCOSE UR STRIP-MCNC: NEGATIVE MG/DL
HCO3 SERPL-SCNC: 23 MMOL/L (ref 22–29)
HGB UR QL STRIP: NEGATIVE
HYALINE CASTS #/AREA URNS LPF: ABNORMAL /LPF
KETONES UR STRIP-MCNC: NEGATIVE MG/DL
LEUKOCYTE ESTERASE UR QL STRIP: NEGATIVE
NITRATE UR QL: NEGATIVE
PH UR STRIP: 6.5 [PH] (ref 5–7)
PHOSPHATE SERPL-MCNC: 3 MG/DL (ref 2.5–4.5)
POTASSIUM SERPL-SCNC: 4.5 MMOL/L (ref 3.4–5.3)
PROT/CREAT 24H UR: 0.22 MG/MG CR (ref 0–0.2)
RBC #/AREA URNS AUTO: ABNORMAL /HPF
SKIP: NORMAL
SODIUM SERPL-SCNC: 137 MMOL/L (ref 135–145)
SP GR UR STRIP: 1.01 (ref 1–1.03)
UROBILINOGEN UR STRIP-MCNC: NORMAL MG/DL
WBC #/AREA URNS AUTO: ABNORMAL /HPF

## 2025-05-28 PROCEDURE — 82040 ASSAY OF SERUM ALBUMIN: CPT

## 2025-05-28 PROCEDURE — 84156 ASSAY OF PROTEIN URINE: CPT

## 2025-05-28 PROCEDURE — 81001 URINALYSIS AUTO W/SCOPE: CPT

## 2025-05-28 PROCEDURE — 36591 DRAW BLOOD OFF VENOUS DEVICE: CPT

## 2025-05-28 PROCEDURE — 81003 URINALYSIS AUTO W/O SCOPE: CPT

## 2025-05-28 PROCEDURE — 250N000011 HC RX IP 250 OP 636: Performed by: INTERNAL MEDICINE

## 2025-05-28 RX ORDER — OMEPRAZOLE 40 MG/1
40 CAPSULE, DELAYED RELEASE ORAL DAILY
Qty: 30 CAPSULE | Refills: 3 | Status: SHIPPED | OUTPATIENT
Start: 2025-05-28

## 2025-05-28 RX ORDER — HEPARIN SODIUM (PORCINE) LOCK FLUSH IV SOLN 100 UNIT/ML 100 UNIT/ML
500 SOLUTION INTRAVENOUS ONCE
Status: COMPLETED | OUTPATIENT
Start: 2025-05-28 | End: 2025-05-28

## 2025-05-28 RX ORDER — ACYCLOVIR 400 MG/1
800 TABLET ORAL
Qty: 360 TABLET | Refills: 3 | Status: SHIPPED | OUTPATIENT
Start: 2025-05-28

## 2025-05-28 RX ADMIN — Medication 500 UNITS: at 09:06

## 2025-05-28 NOTE — PROGRESS NOTES
See IV flow sheet for details of port access. Labs sent for Dr Chew renal panel, u/a, urine protein. Port needle flushed per protocol and needle removed without difficulty.    Precious Torres RN

## 2025-05-29 ENCOUNTER — RESULTS FOLLOW-UP (OUTPATIENT)
Dept: DERMATOLOGY | Facility: CLINIC | Age: 65
End: 2025-05-29

## 2025-05-29 ENCOUNTER — DOCUMENTATION ONLY (OUTPATIENT)
Dept: ONCOLOGY | Facility: CLINIC | Age: 65
End: 2025-05-29
Payer: MEDICARE

## 2025-06-02 ENCOUNTER — PATIENT OUTREACH (OUTPATIENT)
Dept: NEPHROLOGY | Facility: CLINIC | Age: 65
End: 2025-06-02
Payer: MEDICARE

## 2025-06-02 NOTE — PROGRESS NOTES
"6/2- Called Nik in regards to the following message sent to clinic.   Patient and spouse calling about blood pressure medication started approximately one month ago. Patient is reporting feeling \"woozy\" and tired. Patient states that the \"woozy\" feeling has been getting worse the past 4 days and is worse when up and moving. BP sitting 124/74 Pulse 79, standing 129/78 pulse 89, after walking 116/75 pulse 86. Blood pressures vary throughout the day. Medication is taken at 8 am, sometimes a little earlier. Patient drinks water in addition to coffee and iced tea throughout the day.Patient would like a call back to discuss.   I spoke to Nik and he stated that today his bp was around 101/68 and still feeling a bit off when he gets that low. He reports no swelling or SOB. Eating and hydrating well. Routed to provider to update and advise.  Ayan MAXWELL RN  Nephrology care coordinator  [facility]    "

## 2025-06-03 ENCOUNTER — PATIENT OUTREACH (OUTPATIENT)
Dept: NEPHROLOGY | Facility: CLINIC | Age: 65
End: 2025-06-03
Payer: MEDICARE

## 2025-06-03 DIAGNOSIS — N18.2 BENIGN HYPERTENSION WITH CKD (CHRONIC KIDNEY DISEASE), STAGE II: ICD-10-CM

## 2025-06-03 DIAGNOSIS — I12.9 BENIGN HYPERTENSION WITH CKD (CHRONIC KIDNEY DISEASE), STAGE II: ICD-10-CM

## 2025-06-03 RX ORDER — TORSEMIDE 5 MG/1
5 TABLET ORAL DAILY
Qty: 90 TABLET | Refills: 3 | Status: SHIPPED | OUTPATIENT
Start: 2025-06-03

## 2025-06-03 NOTE — PROGRESS NOTES
6/3- Called Nik to let him know the following medication change per Dr. Chew.   reduce torsemide to 5 mg daily from 10 mg daily. Monitor BP and swelling in 2 weeks.  I spoke to Nik and Lamar on speaker and they verbalized understanding the above information. He stated he is able to easily split his 10 mg tablets to obtain 5 mg dose. He will send home readings directly to me in 2 weeks.  Ayan MAXWELL RN  Nephrology care coordinator  [facility]

## 2025-06-09 DIAGNOSIS — D89.813 GVHD AS COMPLICATION OF BONE MARROW TRANSPLANT (H): ICD-10-CM

## 2025-06-09 DIAGNOSIS — T86.09 GVHD AS COMPLICATION OF BONE MARROW TRANSPLANT (H): ICD-10-CM

## 2025-06-09 RX ORDER — PREDNISONE 5 MG/1
TABLET ORAL
Qty: 50 TABLET | Refills: 1 | Status: SHIPPED | OUTPATIENT
Start: 2025-06-09

## 2025-06-09 RX ORDER — PREDNISONE 2.5 MG/1
2.5 TABLET ORAL DAILY
Qty: 30 TABLET | Refills: 1 | Status: SHIPPED | OUTPATIENT
Start: 2025-06-09

## 2025-06-17 ENCOUNTER — MYC REFILL (OUTPATIENT)
Dept: TRANSPLANT | Facility: CLINIC | Age: 65
End: 2025-06-17
Payer: MEDICARE

## 2025-06-17 DIAGNOSIS — E78.2 MIXED HYPERLIPIDEMIA: ICD-10-CM

## 2025-06-17 RX ORDER — ROSUVASTATIN CALCIUM 5 MG/1
5 TABLET, COATED ORAL DAILY
Qty: 30 TABLET | Refills: 3 | Status: SHIPPED | OUTPATIENT
Start: 2025-06-17

## 2025-06-21 ASSESSMENT — SLEEP AND FATIGUE QUESTIONNAIRES
HOW LIKELY ARE YOU TO NOD OFF OR FALL ASLEEP IN A CAR, WHILE STOPPED FOR A FEW MINUTES IN TRAFFIC: WOULD NEVER DOZE
HOW LIKELY ARE YOU TO NOD OFF OR FALL ASLEEP WHILE SITTING AND READING: SLIGHT CHANCE OF DOZING
HOW LIKELY ARE YOU TO NOD OFF OR FALL ASLEEP WHEN YOU ARE A PASSENGER IN A CAR FOR AN HOUR WITHOUT A BREAK: SLIGHT CHANCE OF DOZING
HOW LIKELY ARE YOU TO NOD OFF OR FALL ASLEEP WHILE SITTING QUIETLY AFTER LUNCH WITHOUT ALCOHOL: SLIGHT CHANCE OF DOZING
HOW LIKELY ARE YOU TO NOD OFF OR FALL ASLEEP WHILE SITTING INACTIVE IN A PUBLIC PLACE: WOULD NEVER DOZE
HOW LIKELY ARE YOU TO NOD OFF OR FALL ASLEEP WHILE LYING DOWN TO REST IN THE AFTERNOON WHEN CIRCUMSTANCES PERMIT: MODERATE CHANCE OF DOZING
HOW LIKELY ARE YOU TO NOD OFF OR FALL ASLEEP WHILE SITTING AND TALKING TO SOMEONE: WOULD NEVER DOZE
HOW LIKELY ARE YOU TO NOD OFF OR FALL ASLEEP WHILE WATCHING TV: SLIGHT CHANCE OF DOZING

## 2025-06-26 ENCOUNTER — OFFICE VISIT (OUTPATIENT)
Dept: SLEEP MEDICINE | Facility: CLINIC | Age: 65
End: 2025-06-26
Payer: MEDICARE

## 2025-06-26 VITALS
DIASTOLIC BLOOD PRESSURE: 74 MMHG | SYSTOLIC BLOOD PRESSURE: 112 MMHG | OXYGEN SATURATION: 99 % | WEIGHT: 272 LBS | BODY MASS INDEX: 36.05 KG/M2 | RESPIRATION RATE: 17 BRPM | HEIGHT: 73 IN | HEART RATE: 71 BPM

## 2025-06-26 DIAGNOSIS — G47.33 OSA (OBSTRUCTIVE SLEEP APNEA): Primary | ICD-10-CM

## 2025-06-26 NOTE — NURSING NOTE
"Chief Complaint   Patient presents with    CPAP Follow Up       Initial /74   Pulse 71   Resp 17   Ht 1.854 m (6' 1\")   Wt 123.4 kg (272 lb)   SpO2 99%   BMI 35.89 kg/m   Estimated body mass index is 35.89 kg/m  as calculated from the following:    Height as of this encounter: 1.854 m (6' 1\").    Weight as of this encounter: 123.4 kg (272 lb).    Medication Reconciliation: complete      DME: [facility]       ANASTACIA Castellanos       "

## 2025-06-26 NOTE — PATIENT INSTRUCTIONS
"MY INFORMATION ON SLEEP APNEA-  Nik Nava    DOCTOR : PEE Walker CNP  SLEEP CENTER :      MY CONTACT NUMBER:   [facility]  [phone]  [facility]   [phone]  [facility]   [phone]      [facility]  [phone]    DME: [facility]    Key Points:  1. What is Obstructive Sleep Apnea (KISHAN)? KISHAN is the most common type of sleep apnea. Apnea literally means, \"without breath.\" It is characterized by repetitive pauses in breathing, despite continued effort to breathe, and is usually associated with a reduction in blood oxygen saturation. Apneas can last 10 to over 60 seconds. It is caused by narrowing or collapse of the upper airway as muscles relax during sleep.   2. What are the consequences of KISHAN? Symptoms include: daytime sleepiness- possibly increasing the risk of falling asleep while driving, unrefreshing/restless sleep, snoring, insomnia, waking frequently to urinate, waking with heartburn or reflux, reduced concentration and memory, and morning headaches. Other health consequences may include development of high blood pressure. Untreated KISHAN also can contribute to heart disease, stroke and diabetes.   3. What are the treatment options? In most situations, sleep apnea is a lifelong disease that must be managed with daily therapy. Continuous Positive Airway (CPAP) is the most reliable treatment. A mouthguard to hold your jaw forward is usually the next most reliable option. Other options include postioning devices (to keep you off your back), nasal valves, tongue retaining device, weight loss, surgery. There is more detail about these options toward the end of this document.  4. What are the most important things to remember about using CPAP?     WHERE CAN I FIND MORE INFORMATION?    American Academy of Sleep Medicine Patient information on sleep disorders:  http://yoursleep.aasmnet.org    CPAP " "-  WHY AND HOW?                 Continuous positive airway pressure, or CPAP, is the most effective treatment for obstructive sleep apnea. It works by blowing room air, through a mask, to hold your throat open. A decision to use CPAP is a major step forward in the pursuit of a healthier life. The successful use of CPAP will help you breathe easier, sleep better and live healthier. Using CPAP can be a positive experience if you keep these soliz points in mind:  Commitment  CPAP is not a quick fix for your problem. It involves a long-term commitment to improve your sleep and your health.    Communication  Stay in close communication with both your sleep doctor and your CPAP supplier. Ask lots of questions and seek help when you need it.    Consistency  Use CPAP all night, every night and for every nap. You will receive the maximum health benefits from CPAP when you use it every time that you sleep. This will also make it easier for your body to adjust to the treatment.    Correction  The first machine and mask that you try may not be the best ones for you. Work with your sleep doctor and your CPAP supplier to make corrections to your equipment selection. Ask about trying a different type of machine or mask if you have ongoing problems. Make sure that your mask is a good fit and learn to use your equipment properly.    Challenge  Tell a family member or close friend to ask you each morning if you used your CPAP the previous night. Have someone to challenge you to give it your best effort.    Connection   Your adjustment to CPAP will be easier if you are able to connect with others who use the same treatment. Ask your sleep doctor if there is a support group in your area for people who have sleep apnea, or look for one on the Internet.  Comfort   Increase your level of comfort by using a saline spray, decongestant or heated humidifier if CPAP irritates your nose, mouth or throat. Use your unit's \"ramp\" setting to slowly " get used to the air pressure level. There may be soft pads you can buy that will fit over your mask straps. Look on www.CPAP.com for accessories that can help make CPAP use more comfortable.  Cleaning   Clean your mask, tubing and headgear on a regular basis. Put this time in your schedule so that you don't forget to do it. Check and replace the filters for your CPAP unit and humidifier.    Completion   Although you are never finished with CPAP therapy, you should reward yourself by celebrating the completion of your first month of treatment. Expect this first month to be your hardest period of adjustment. It will involve some trial and error as you find the machine, mask and pressure settings that are right for you.    Continuation  After your first month of treatment, continue to make a daily commitment to use your CPAP all night, every night and for every nap.    CPAP-Tips to starting with success:  Begin using your CPAP for short periods of time during the day while you watch TV or read.    Use CPAP every night and for every nap. Using it less often reduces the health benefits and makes it harder for your body to get used to it.    Newer CPAP models are virtually silent; however, if you find the sound of your CPAP machine to be bothersome, place the unit under your bed to dampen the sound.     Make small adjustments to your mask, tubing, straps and headgear until you get the right fit. Tightening the mask may actually worsen the leak.  If it leaves significant marks on your face or irritates the bridge of your nose, it may not be the best mask for you.  Speak with the person who supplied the mask and consider trying other masks. Insurances will allow you to try different masks during the first month of starting CPAP.  Insurance also covers a new mask, hose and filter about every 6 months.    Use a saline nasal spray to ease mild nasal congestion. Neti-Pot or saline nasal rinses may also help. Nasal gel sprays  can help reduce nasal dryness.  Biotene mouthwash can be helpful to protect your teeth if you experience frequent dry mouth.  Dry mouth may be a sign of air escaping out of your mouth or out of the mask in the case of a full face mask.  Speak with your provider if you expect that is the case.     Take a nasal decongestant to relieve more severe nasal or sinus congestion.  Do not use Afrin (oxymetazoline) nasal spray more than 3 days in a row.  Speak with your sleep doctor if your nasal congestion is chronic.    Use a heated humidifier that fits your CPAP model to enhance your breathing comfort. Adjust the heat setting up if you get a dry nose or throat, down if you get condensation in the hose or mask.  Position the CPAP lower than you so that any condensation in the hose drains back into the machine rather than towards the mask.    Try a system that uses nasal pillows if traditional masks give you problems.    Clean your mask, tubing and headgear once a week. Make sure the equipment dries fully.    Regularly check and replace the filters for your CPAP unit and humidifier.    Work closely with your sleep provider and your CPAP supplier to make sure that you have the machine, mask and air pressure setting that works best for you. It is better to stop using it and call your provider to solve problems than to lay awake all night frustrated with the device.    Weight Loss:    Weight loss decreases severity of sleep apnea in most people with obesity. For those with mild obesity who have developed snoring with weight gain, even 15-30 pound weight loss can improve and occasionally eliminate sleep apnea.  Structured and life-long dietary and health habits are necessary to lose weight and keep healthier weight levels.     Though there are significant health benefits from weight loss, long-term weight loss is very difficult to achieve- studies show success with dietary management in less than 10% of people. In addition,  substantial weight loss may require years of dietary control and may be difficult if patients have severe obesity. In these cases, surgical management may be considered.    If you are interested in methods for weight loss, you should review the options discussed at the National Institutes of Health patient information sites:     http://win.niddk.nih.gov/publications/index.htm  http:/www.health.nih.gov/topic/WeightLossDieting    Bariatric programs offer counseling in all methods of weight loss:    Http:/[email or web address]/htm    Your BMI is Body mass index is 35.89 kg/m .    Weight management plan: Patient was referred to their PCP to discuss a diet and exercise plan.    Body mass index (BMI) is one way to tell whether you are at a healthy weight, overweight, or obese. It measures your weight in relation to your height.  A BMI of 18.5 to 24.9 is in the healthy range. A person with a BMI of 25 to 29.9 is considered overweight, and someone with a BMI of 30 or greater is considered obese.  Another way to find out if you are at risk for health problems caused by overweight and obesity is to measure your waist. If you are a woman and your waist is more than 35 inches, or if you are a man and your waist is more than 40 inches, your risk of disease may be higher.  More than two-thirds of American adults are considered overweight or obese. Being overweight or obese increases the risk for further weight gain.  Excess weight may lead to heart disease and diabetes. Creating and following plans for healthy eating and physical activity may help you improve your health.    Methods for maintaining or losing weight.    Weight control is part of healthy lifestyle and includes exercise, emotional health, and healthy eating habits.  Careful eating habits lifelong is the mainstay of weight control.  Though there are significant health benefits from weight loss, long-term weight loss  with diet alone may be very difficult to achieve- studies show long-term success with dietary management in less than 10% of people. Attaining a healthy weight may be especially difficult to achieve in those with severe obesity. In some cases, medications, devices and surgical management might be considered.    What can you do?    If you are overweight or obese and are interested in methods for weight loss, you should discuss this with your provider. In addition, we recommend that you review healthy life styles and methods for weight loss available through the National Institutes of Health patient information sites:     http://win.niddk.nih.gov/publications/index.htm

## 2025-06-26 NOTE — PROGRESS NOTES
Sleep Apnea - Follow-up Visit:    Impression/Plan:  1. KISHAN (obstructive sleep apnea) (Primary)     Severe Sleep apnea. Tolerating PAP well. Daytime symptoms are improved.  He continues to use and benefit from PAP therapy.  The patient is meeting compliance requirements at this time.  Overall, he is doing well on PAP therapy and has no immediate concerns at this time.    A review of his APAP download data over the last 30 days shows excellent use and compliance and severe KISHAN that is well-controlled on current pressure settings.    Continue current plan of care.  No new orders placed at this visit.    Nik Nava will follow up in about 1 year(s).     28 minutes spent with patient, all of which were spent face-to-face counseling, consulting, chart review/documentation, and coordinating plan of care on the date of the encounter.      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  He was last seen in an office visit with me on 3/10/2025 in follow-up to review HST results and a subsequent order for PAP therapy was placed at that time.    Previous Study Results:   Home Sleep Apnea Testing - 3/04/2025: Weight: 262.0 lbs     Analysis Time: 333.2 minutes     Respiration:  Sleep Associated Hypoxemia: Sustained hypoxemia was present. Baseline oxygen saturation was 92%. Time with saturation less than 89% was 44.1 minutes. Time with saturation  less than 90% was 83.4 minutes. The lowest oxygen saturation was 76.0%.  Snoring: Snoring was present 21% of the time with an average level of 71 dB. Duration of time snoring above 70 dB was 68 minutes.  Snoring was reported as loud.      Respiratory events: The home study revealed a presence of 93 obstructive apneas and 32 mixed and central apneas. There were 206 hypopneas resulting in a combined apnea/hypopnea index [AHI] of 60 events per hour with  0 per hour supine, 0  per hour prone, 0 per hour upright, 54  per hour left side, and 65 per hour right side.     Position: Percent of time spent: Supine 0%, prone 0%, upright 0%, on left 48%, on right 52%.     Heart Rate: By Pulse Oximetry normal rate was noted.     Assessment:  Severe obstructive sleep apnea.  Sleep associated hypoxemia was present.      DME: [facility]      Do you use a CPAP Machine at home: (Patient-Rptd) Yes  Overall, on a scale of 0-10 how would you rate your CPAP (0 poor, 10 great): (Patient-Rptd) 9    What type of mask do you use: (Patient-Rptd) Full Face Mask  Is your mask comfortable: (Patient-Rptd) Yes  If not, why:      Is your mask leaking: (Patient-Rptd) No  If yes, where do you feel it:    How many night per week does the mask leak (0-7):      Do you notice snoring with mask on: (Patient-Rptd) No  Do you notice gasping arousals with mask on: (Patient-Rptd) No  Are you having significant oral or nasal dryness: (Patient-Rptd) Yes  Is the pressure setting comfortable: (Patient-Rptd) Yes  If not, why:      What is your typical bedtime: (Patient-Rptd) 9pm  How long does it take you to go to sleep on PAP therapy: (Patient-Rptd) 15min  What time do you typically get out of bed for the day: (Patient-Rptd) 4-5 am  How many hours on average per night are you using PAP therapy: (Patient-Rptd) 7  How many hours are you sleeping per night: (Patient-Rptd) 7  Do you feel well rested in the morning: (Patient-Rptd) No      ResMed AirSense 10  (set up on 3/2025 at [facility])  Auto-PAP 5 - 15 cmH2O 30 day usage data:  5/26/25 - 6/24/25  100% of days with > 4 hours of use. 0/30 days with no use.   Average use 7 hours 18 minutes per day.   95%ile Leak 0.6 L/min.   CPAP 95% pressure 12.9 cm.   AHI 2.0 events per hour.          EPWORTH SLEEPINESS SCALE         6/21/2025     9:32 AM    [place] Sleepiness Scale ( LORETTA Elmore  [id number]<br>ESS - [place]/English - Final version - 21 Nov 07 - [facility] [employer].)   Sitting and reading Slight chance of dozing   Watching TV Slight chance of dozing   Sitting, inactive in a public place (e.g. a theatre or a meeting) Would never doze   As a passenger in a car for an hour without a break Slight chance of dozing   Lying down to rest in the afternoon when circumstances permit Moderate chance of dozing   Sitting and talking to someone Would never doze   Sitting quietly after a lunch without alcohol Slight chance of dozing   In a car, while stopped for a few minutes in traffic Would never doze   Rosston Score (MC) 6   Rosston Score (Sleep) 6        Patient-reported       INSOMNIA SEVERITY INDEX (EDGARDO)          6/21/2025     9:28 AM   Insomnia Severity Index (EDGARDO)   Difficulty falling asleep 0   Difficulty staying asleep 1   Problems waking up too early 2   How SATISFIED/DISSATISFIED are you with your CURRENT sleep pattern? 2   How NOTICEABLE to others do you think your sleep problem is in terms of impairing the quality of your life? 1   How WORRIED/DISTRESSED are you about your current sleep problem? 1   To what extent do you consider your sleep problem to INTERFERE with your daily functioning (e.g. daytime fatigue, mood, ability to function at work/daily chores, concentration, memory, mood, etc.) CURRENTLY? 2   EDGARDO Total Score 9        Patient-reported       Guidelines for Scoring/Interpretation:  Total score categories:  0-7 = No clinically significant insomnia   8-14 = Subthreshold insomnia   15-21 = Clinical  insomnia (moderate severity)  22-28 = Clinical insomnia (severe)  Used via courtesy of www.myhealth.va.gov with permission from Cheng Bunch PhD., [facility]      Past medical/surgical history, family history, social history, medications and allergies were reviewed.        Problem List:  Patient Active Problem List    Diagnosis Date Noted    Need for vaccination 02/06/2025     Priority: Medium    Parainfluenza 12/18/2024     Priority: Medium    Fever in adult 12/18/2024     Priority: Medium    Class 2 severe obesity due to excess calories with serious comorbidity in adult (H) 12/10/2024     Priority: Medium    Hyperkalemia 11/25/2024     Priority: Medium    Bacterial pneumonia 08/26/2024     Priority: Medium    Edema of lower extremity 08/26/2024     Priority: Medium    Hyponatremia 08/26/2024     Priority: Medium    Other specified hypothyroidism 08/26/2024     Priority: Medium    Pulmonary embolism (H) 08/26/2024     Priority: Medium    Immunocompromised 06/04/2024     Priority: Medium     Formatting of this note might be different from the original.   Has had multiple hospitalizations for infection due to immunocompromised state   2024 - RSV, pneumonia   2023 - diskitis, osteomyelitis of the spine      Unspecified adrenocortical insufficiency 06/04/2024     Priority: Medium    Hypogammaglobulinemia 04/05/2024     Priority: Medium    RSV (acute bronchiolitis due to respiratory syncytial virus) 03/26/2024     Priority: Medium    Constipation 01/30/2024     Priority: Medium    Insomnia 01/30/2024     Priority: Medium    Peripheral nerve disease 01/30/2024     Priority: Medium    Anemia in stage 3a chronic kidney disease (H) 01/29/2024     Priority: Medium    Osteomyelitis of lumbar spine (H) 01/29/2024     Priority: Medium    Combined forms of age-related cataract of both eyes 12/05/2023     Priority: Medium    Dry eye 12/05/2023     Priority: Medium    Discitis of lumbar region 09/05/2023     Priority:  Medium    Chronic, continuous use of opioids 09/03/2023     Priority: Medium    Postablative hypothyroidism 03/01/2023     Priority: Medium    On corticosteroid therapy 03/01/2023     Priority: Medium    History of Graves' disease 03/01/2023     Priority: Medium    Fatigue, unspecified type 03/01/2023     Priority: Medium    Shortness of breath 12/01/2022     Priority: Medium    Influenza A 12/01/2022     Priority: Medium    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) 10/12/2022     Priority: Medium    Generalized muscle weakness 04/26/2022     Priority: Medium    Musa-Barr virus viremia 04/16/2022     Priority: Medium    Status post bone marrow transplant (H) 08/23/2021     Priority: Medium     Added automatically from request for surgery [id number]      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) 07/02/2021     Priority: Medium    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) 07/02/2021     Priority: Medium    History of pulmonary embolism 04/03/2021     Priority: Medium    Hx of acute pancreatitis 03/20/2021     Priority: Medium    Prediabetes 01/11/2021     Priority: Medium    HTN (hypertension) 06/29/2020     Priority: Medium    History of primary malignant neoplasm of kidney 11/14/2007     Priority: Medium     Formatting of this note might be different from the original.   S/P right nephrectomy for renal cell cancer 10/07      Sleep apnea 10/22/2007     Priority: Medium     Formatting of this note might be different from the original.   Not on CPAP      Tobacco use disorder 10/22/2007     Priority: Medium     Formatting of this note might be different from the original.   Chew      Primary renal cell carcinoma of native right kidney (H) 10/07/2007     Priority: Medium      Current Outpatient Medications   Medication Sig Dispense Refill    acyclovir (ZOVIRAX) 400 MG tablet Take 2 tablets (800 mg) by mouth every 12 hours 360 tablet 3    dexAMETHasone alcohol-free (DECADRON) 0.1 MG/ML  "solution Swish and spit 10 mLs (1 mg) in mouth 2 times daily. 500 mL 2    escitalopram (LEXAPRO) 10 MG tablet Take 1 tablet (10 mg) by mouth daily 30 tablet 3    hydrocortisone (CORTEF) 10 MG tablet Take 2 tablets (20 mg) by mouth daily AND 1 tablet (10 mg) daily at 2 pm. 180 tablet 1    levothyroxine (SYNTHROID/LEVOTHROID) 112 MCG tablet Take 1 tablet (112 mcg) by mouth daily. 90 tablet 4    nicotine polacrilex (NICORETTE) 4 MG gum Place 4 mg inside cheek daily.      Nutritional Supplements (ENSURE COMPLETE SHAKE) LIQD Take 11 mLs by mouth every morning      omeprazole (PRILOSEC) 40 MG DR capsule Take 1 capsule (40 mg) by mouth daily. 30 capsule 3    PANTOTHENIC ACID PO Take 100 mg by mouth daily.      penicillin V (VEETID) 250 MG tablet Take 1 tablet (250 mg) by mouth 2 times daily. 60 tablet 2    predniSONE (DELTASONE) 2.5 MG tablet Take 1 tablet (2.5 mg) by mouth daily. Take with 1 ( 5mg) for a total of 7.5 mg daily. 30 tablet 1    predniSONE (DELTASONE) 5 MG tablet Take with one (2.5 mg) tablet for a total of 7.5 mg daily. 50 tablet 1    Riboflavin 100 MG TABS Take by mouth.      rosuvastatin (CRESTOR) 5 MG tablet Take 1 tablet (5 mg) by mouth daily. 30 tablet 3    sulfamethoxazole-trimethoprim (BACTRIM DS) 800-160 MG tablet Take 1 tablet by mouth Every Mon, Wed, Fri Morning.      tacrolimus (PROTOPIC) 0.1 % external ointment Apply topically 2 times daily 30 g 1    torsemide (DEMADEX) 5 MG tablet Take 1 tablet (5 mg) by mouth daily. 90 tablet 3     No current facility-administered medications for this visit.     /74   Pulse 71   Resp 17   Ht 1.854 m (6' 1\")   Wt 123.4 kg (272 lb)   SpO2 99%   BMI 35.89 kg/m        This note was written with the assistance of the Dragon voice-dictation technology software. The final document, although reviewed, may contain errors. For corrections, please contact the office.    "

## 2025-07-08 DIAGNOSIS — D89.813 GVHD AS COMPLICATION OF BONE MARROW TRANSPLANT (H): ICD-10-CM

## 2025-07-08 DIAGNOSIS — E78.2 MIXED HYPERLIPIDEMIA: Primary | ICD-10-CM

## 2025-07-08 DIAGNOSIS — Z94.81 STATUS POST BONE MARROW TRANSPLANT (H): ICD-10-CM

## 2025-07-08 DIAGNOSIS — T86.09 GVHD AS COMPLICATION OF BONE MARROW TRANSPLANT (H): ICD-10-CM

## 2025-07-08 NOTE — PROGRESS NOTES
BMT Clinic Note  07/09/2025    ID:  Nik Nava is a 65 year old man D+1429 s/p NMA allo sib PBSCT for Ph+ ALL, cGVHD enrolled on PQRST study - completed. 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    HPI: Today feels reasonably well.  At her last visit, we had attempted to switch from prednisone to hydrocortisone with the idea of a continued taper, but a few weeks after beginning this, he was having significant orthostasis, fatigue, joint pain, and for that reason, we recommended he restart prednisone 7.5 mg/day.  He is continue that and states that he does feel better.  Currently, he does note ongoing fatigue and often naps each day.  He is sleeping better at night after beginning CPAP.  He does wake up more refreshed and thinks his CPAP has been helpful.  He works around the house, does chores around the house works outside, and otherwise attempts stay busy.  He is continuing to eat a reasonably stable diet, and his weight is remaining stable.  His blood pressure was on the high side and recently was started on torsemide which has helped his blood pressure as well as the swelling.    ROS: 10 point Review of systems was negative except as detailed above     PHYSICAL EXAM          Blood pressure 118/73, pulse 78, temperature 97.9  F (36.6  C), temperature source Oral, resp. rate 18, weight 121.8 kg (268 lb 9.6 oz), SpO2 97%.    Wt Readings from Last 4 Encounters:   07/09/25 121.8 kg (268 lb 9.6 oz)   06/26/25 123.4 kg (272 lb)   05/14/25 123.4 kg (272 lb)   04/29/25 123.3 kg (271 lb 12.8 oz)      KPS: 80  General: NAD.  HEENT: sclera anicteric. Lichenoid changes and associated erythema of L buccal mucosa.  No active ulceration.  Lymph nodes: No lymphadenopathy in his head neck region, upper chest, or axilla  Lungs: CTA b/l  no wheezes  CV: RRR  no gallop  Abd: Soft, no tenderness to palpation  Ext/Skin: Scatter bruises on both arms. No rashes.     Chronic GVHD          5/14/2025    13:29 3/12/2025    11:51  12/24/2024    11:48   Chronic GVHD   Has chronic geiej-xdeqym-yuow disease developed since the last entry? No No No   Is there persistence of chronic pjrph-rlbqbz-jgdm disease since the last entry? Yes Yes Yes   Total Chronic Xrxnm-Lewtnq-Ruwi Disease Score 2 2 2   Subjective Clinician Opinion of Severity Mild Mild Mild       Blood Counts     Recent Labs   Lab Test 07/09/25  0923 05/21/25  1445 05/14/25  1011   HGB 12.3* 12.7* 11.9*   HCT 36.3* 37.0* 34.7*   WBC 9.5 11.2* 10.6    203 210       Chemistries     Basic Panel  Recent Labs   Lab Test 07/09/25  0923 05/28/25  0905 05/21/25  1445    137 137   POTASSIUM 4.1 4.5 4.7   CHLORIDE 105 102 101   CO2 23 23 24   BUN 36.5* 47.8* 41.3*   CR 1.43* 1.38* 1.68*   * 93 117*      Calcium, Magnesium, Phosphorus  Recent Labs   Lab Test 07/09/25  0923 05/28/25  0905 05/21/25  1445 05/14/25  1011 04/29/25  1255 12/24/24  1038 12/20/24  0455 12/19/24  0254 12/18/24  0758   DAMON 10.1 10.4 9.9   < > 9.7   < > 9.2 8.7* 9.0   MAG  --   --   --   --   --   --  1.9 1.7 1.7   PHOS  --  3.0 3.2  --  2.4*   < > 2.9 2.5  --     < > = values in this interval not displayed.      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     1. Acute lymphoblastic leukemia, [place] chromosome positive. S/p VERÓNICA allo sib PBSCT. Has not received TKI maintenance due to active GVHD.  Restaging at 2Y consistent with MRD negative CR. BM % donor. FISH No evidence of BCR::ABL1 fusion. CG No recipient (male) cells or cells with a t(9;22) or other clonal chromosomal abnormality were detected among the 20 metaphases analyzed.    - BCR::ABL  monitoring quarterly     2. HEME:   Counts recovered, transfusion independent.     Anemia of chronic disease  Fluctuated during prior infection. Periodically monitor ferritin.     3. GVHD  Ocular GVHD - NIH score 1  Follows with ophthalmology.  - Currently using scleral lenses.      Oral GVHD - NIH score 1  Lichenoid change of L buccal mucosa. Mild dry mouth not interfering with PO intake.  - Dexamethasone s/s BID, taper as tolerated    Skin GVHD- history of biopsy proven GVHD of the skin 8/30/2021; resolved.   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      Systemic treatment for GVHD  Corticosteroids - On steroid chronically due to frequent flare of GVH with taper. Had GVHD flare while tapered down to 10 mg daily, so currently on a very slow taper course.  - Able to tolerate taper to prednisone 7.5 mg daily without signs of GVH flare. We will switch to equivalent hydrocortisone (30 mg daily, splitting into 20 mg in AM and 10 mg midday). Continue for the next 2 months, will consider further taper at next visit.      GVHD history  cGVHD therapy started on February 24 2022  - Baseline NIH scoring 2/24/2022: Skin 2 maculopapular rash/erythema with no sclerotic features, mouth score 1 mild symptoms with lichenoid changes less than 25%, eyes score 2 moderate symptoms without new visual impairments due to KCS requiring lubricant eyedrops more than 3 times a day, overall mild scale for her provider     Previous therapies  Tacrolimus - Experienced mild NEGRA, hyperkalemia, hypomagnesemia, and reports some tremors and cramps. Switched to sirolimus Sep 2022.  Sirolimus - Discontinued Oct 2022 due to GVH flare and significant fluid retention and proteinuria.  Belumosudil - Patient intolerant/with disease flares on tacrolimus and sirolimus as above. This was started on Oct 2022 - skin/liver/oral GVHD inactive, and occular symptoms controlled/symptomatic improvement. Discontinued Oct 2023 due to recurrent  infections.  Ruxolitinib - Started in Dec 2024 in an attempt to taper him off of steroid. However, was feeling unwell (coincide with viral infection), decision was made to discontinue.     4. ID  PJP pneumonia  Recent history of persistent fever with patchy peribronchovascular groundglass opacities on CT (10/21/2024). Extensive work-up revealed PJP positive from BAL (11/1/2024). Completed high-dose bactrim for 21 days per ID recommendation     Rhinovirus infection  Parainfluenza infection  Tested positive for rhinovirus on 11/18/2024 after he developed fatigue, malaise, URI symptoms. Also had parainfluenza URI in Dec 2024.     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 Up to date for influenza, COVID, and RSV vaccines.  Complete pneumococcal, HiB, and meningococcal vaccination.  - CD4, IgG, Rubella IgG with next lab draw to guide MMR vaccinations     5. 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  Baseline Cr around 1.2-1.3. Cr slightly elevated today, will continue to monitor  - Torsemide 10 mg daily per nephrology recommendations     6. Endo:   - Hx of graves disease; On synthroid follows with endocrine  - HLD; On rosuvastatin     7. Psych:   - Situational anxiety; Lexapro 10mg daily.        Plan:  -Lower prednisone from 7.5 mg daily, to 7 mg daily.  -Continue acyclovir, Pen-Vee K, Bactrim prophylaxis  -consult endocrine to explore limits of prednisone taper given long term use and HPA axis suppression  -Return to clinic in 3 months    The longitudinal plan of care for the diagnosis(es)/condition(s) as documented were addressed  during this visit. Due to the added complexity in care, I will continue to support Nik in the subsequent management and with ongoing continuity of care.      30 minutes spent on the date of the encounter doing chart review, history and exam, lab review, documentation and coordniating care.    DOMINIQUE BRITTON MD  July 9, 2025

## 2025-07-09 ENCOUNTER — ONCOLOGY VISIT (OUTPATIENT)
Dept: TRANSPLANT | Facility: CLINIC | Age: 65
End: 2025-07-09
Attending: INTERNAL MEDICINE
Payer: MEDICARE

## 2025-07-09 VITALS
RESPIRATION RATE: 18 BRPM | HEART RATE: 78 BPM | TEMPERATURE: 97.9 F | WEIGHT: 268.6 LBS | BODY MASS INDEX: 35.44 KG/M2 | DIASTOLIC BLOOD PRESSURE: 73 MMHG | OXYGEN SATURATION: 97 % | SYSTOLIC BLOOD PRESSURE: 118 MMHG

## 2025-07-09 DIAGNOSIS — T86.09 GVHD AS COMPLICATION OF BONE MARROW TRANSPLANT (H): Primary | ICD-10-CM

## 2025-07-09 DIAGNOSIS — D89.813 GVHD AS COMPLICATION OF BONE MARROW TRANSPLANT (H): Primary | ICD-10-CM

## 2025-07-09 DIAGNOSIS — Z79.69 NEED FOR PROPHYLACTIC CHEMOTHERAPY: ICD-10-CM

## 2025-07-09 LAB
ALBUMIN SERPL BCG-MCNC: 4.2 G/DL (ref 3.5–5.2)
ALP SERPL-CCNC: 79 U/L (ref 40–150)
ALT SERPL W P-5'-P-CCNC: 22 U/L (ref 0–70)
ANION GAP SERPL CALCULATED.3IONS-SCNC: 13 MMOL/L (ref 7–15)
AST SERPL W P-5'-P-CCNC: 32 U/L (ref 0–45)
BASOPHILS # BLD AUTO: 0 10E3/UL (ref 0–0.2)
BASOPHILS NFR BLD AUTO: 0 %
BILIRUB SERPL-MCNC: 0.8 MG/DL
BUN SERPL-MCNC: 36.5 MG/DL (ref 8–23)
CALCIUM SERPL-MCNC: 10.1 MG/DL (ref 8.8–10.4)
CHLORIDE SERPL-SCNC: 105 MMOL/L (ref 98–107)
CREAT SERPL-MCNC: 1.43 MG/DL (ref 0.67–1.17)
EGFRCR SERPLBLD CKD-EPI 2021: 54 ML/MIN/1.73M2
EOSINOPHIL # BLD AUTO: 0.1 10E3/UL (ref 0–0.7)
EOSINOPHIL NFR BLD AUTO: 1 %
ERYTHROCYTE [DISTWIDTH] IN BLOOD BY AUTOMATED COUNT: 16.6 % (ref 10–15)
GLUCOSE SERPL-MCNC: 105 MG/DL (ref 70–99)
HCO3 SERPL-SCNC: 23 MMOL/L (ref 22–29)
HCT VFR BLD AUTO: 36.3 % (ref 40–53)
HGB BLD-MCNC: 12.3 G/DL (ref 13.3–17.7)
IMM GRANULOCYTES # BLD: 0.1 10E3/UL
IMM GRANULOCYTES NFR BLD: 1 %
LYMPHOCYTES # BLD AUTO: 3.5 10E3/UL (ref 0.8–5.3)
LYMPHOCYTES NFR BLD AUTO: 37 %
MCH RBC QN AUTO: 33.1 PG (ref 26.5–33)
MCHC RBC AUTO-ENTMCNC: 33.9 G/DL (ref 31.5–36.5)
MCV RBC AUTO: 98 FL (ref 78–100)
MONOCYTES # BLD AUTO: 1 10E3/UL (ref 0–1.3)
MONOCYTES NFR BLD AUTO: 11 %
NEUTROPHILS # BLD AUTO: 4.8 10E3/UL (ref 1.6–8.3)
NEUTROPHILS NFR BLD AUTO: 51 %
NRBC # BLD AUTO: 0 10E3/UL
NRBC BLD AUTO-RTO: 0 /100
PLATELET # BLD AUTO: 225 10E3/UL (ref 150–450)
POTASSIUM SERPL-SCNC: 4.1 MMOL/L (ref 3.4–5.3)
PROT SERPL-MCNC: 6.9 G/DL (ref 6.4–8.3)
RBC # BLD AUTO: 3.72 10E6/UL (ref 4.4–5.9)
SODIUM SERPL-SCNC: 141 MMOL/L (ref 135–145)
WBC # BLD AUTO: 9.5 10E3/UL (ref 4–11)

## 2025-07-09 PROCEDURE — 250N000011 HC RX IP 250 OP 636: Performed by: INTERNAL MEDICINE

## 2025-07-09 PROCEDURE — G0463 HOSPITAL OUTPT CLINIC VISIT: HCPCS | Performed by: INTERNAL MEDICINE

## 2025-07-09 PROCEDURE — 80053 COMPREHEN METABOLIC PANEL: CPT | Performed by: INTERNAL MEDICINE

## 2025-07-09 PROCEDURE — 36591 DRAW BLOOD OFF VENOUS DEVICE: CPT | Performed by: INTERNAL MEDICINE

## 2025-07-09 PROCEDURE — 85018 HEMOGLOBIN: CPT | Performed by: INTERNAL MEDICINE

## 2025-07-09 RX ORDER — VITAMIN B COMPLEX
1 TABLET ORAL DAILY
COMMUNITY

## 2025-07-09 RX ORDER — PREDNISONE 1 MG/1
2 TABLET ORAL DAILY
Qty: 90 TABLET | Refills: 3 | Status: SHIPPED | OUTPATIENT
Start: 2025-07-09 | End: 2025-07-09

## 2025-07-09 RX ORDER — MULTIVITAMIN
1 TABLET ORAL DAILY
COMMUNITY

## 2025-07-09 RX ORDER — HEPARIN SODIUM (PORCINE) LOCK FLUSH IV SOLN 100 UNIT/ML 100 UNIT/ML
5 SOLUTION INTRAVENOUS ONCE
Status: COMPLETED | OUTPATIENT
Start: 2025-07-09 | End: 2025-07-09

## 2025-07-09 RX ORDER — AMOXICILLIN 500 MG
1200 CAPSULE ORAL DAILY
COMMUNITY

## 2025-07-09 RX ORDER — PREDNISONE 1 MG/1
2 TABLET ORAL DAILY
Qty: 180 TABLET | Refills: 3 | Status: SHIPPED | OUTPATIENT
Start: 2025-07-09 | End: 2025-07-09

## 2025-07-09 RX ORDER — PREDNISONE 1 MG/1
2 TABLET ORAL DAILY
Qty: 180 TABLET | Refills: 3 | Status: SHIPPED | OUTPATIENT
Start: 2025-07-09

## 2025-07-09 RX ADMIN — Medication 5 ML: at 09:29

## 2025-07-09 ASSESSMENT — PAIN SCALES - GENERAL: PAINLEVEL_OUTOF10: NO PAIN (0)

## 2025-07-09 NOTE — NURSING NOTE
"Oncology Rooming Note    July 9, 2025 9:37 AM   Nik Nava is a 65 year old male who presents for:    Chief Complaint   Patient presents with    Port Draw     Labs drawn via port by RN    Oncology Clinic Visit     Need for prophylactic chemotherapy     Initial Vitals: /73   Pulse 78   Temp 97.9  F (36.6  C) (Oral)   Resp 18   Wt 121.8 kg (268 lb 9.6 oz)   SpO2 97%   BMI 35.44 kg/m   Estimated body mass index is 35.44 kg/m  as calculated from the following:    Height as of 6/26/25: 1.854 m (6' 1\").    Weight as of this encounter: 121.8 kg (268 lb 9.6 oz). Body surface area is 2.5 meters squared.  No Pain (0) Comment: Data Unavailable   No LMP for male patient.  Allergies reviewed: Yes  Medications reviewed: Yes    Medications: Medication refills not needed today.  Pharmacy name entered into WIB:    [facility] - [place], [place] - [address] [facility] [place], [place] - [phone]  [facility] - [place], [place] - [address] [id number] - [place], [place] - [address], [place] [facility]    Frailty Screening:   Is the patient here for a new oncology consult visit in cancer care? 2. No    PHQ9:  Did this patient require a PHQ9?: No      Clinical concerns: Hydrocortisone felt as though it didn't work, wondering what next steps are. Pt also had an exposure to strep from a grandson this past weekend - hasn't had any symptoms.      Craig Murillo              "

## 2025-07-09 NOTE — NURSING NOTE
"Chief Complaint   Patient presents with    Port Draw     Labs drawn via port by RN     Labs drawn via port by RN. Port accessed with 20G 3/4\" power needle. Flushed with NS and heparin. Pt tolerated well. De-accessed. Vitals taken. Pt checked in for next appointment.    Suma Andino, RN  "

## 2025-07-09 NOTE — LETTER
7/9/2025      Nik Nava  [address]  [place] [address]      Dear Colleague,    Thank you for referring your patient, Nik Nava, to the [facility] [place]. Please see a copy of my visit note below.    BMT Clinic Note  07/09/2025    ID:  Nik Nava is a 65 year old man D+1429 s/p NMA allo sib PBSCT for Ph+ ALL, cGVHD enrolled on PQRST study - completed. 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    HPI: Today feels reasonably well.  At her last visit, we had attempted to switch from prednisone to hydrocortisone with the idea of a continued taper, but a few weeks after beginning this, he was having significant orthostasis, fatigue, joint pain, and for that reason, we recommended he restart prednisone 7.5 mg/day.  He is continue that and states that he does feel better.  Currently, he does note ongoing fatigue and often naps each day.  He is sleeping better at night after beginning CPAP.  He does wake up more refreshed and thinks his CPAP has been helpful.  He works around the house, does chores around the house works outside, and otherwise attempts stay busy.  He is continuing to eat a reasonably stable diet, and his weight is remaining stable.  His blood pressure was on the high side and recently was started on torsemide which has helped his blood pressure as well as the swelling.    ROS: 10 point Review of systems was negative except as detailed above     PHYSICAL EXAM          Blood pressure 118/73, pulse 78, temperature 97.9  F (36.6  C), temperature source Oral, resp. rate 18, weight 121.8 kg (268 lb 9.6 oz), SpO2 97%.    Wt Readings from Last 4 Encounters:   07/09/25 121.8 kg (268 lb 9.6 oz)   06/26/25 123.4 kg (272 lb)   05/14/25 123.4 kg (272 lb)   04/29/25 123.3 kg (271 lb 12.8 oz)      KPS: 80  General: NAD.  HEENT: sclera anicteric. Lichenoid changes and associated erythema of L buccal mucosa.  No active ulceration.  Lymph nodes: No  lymphadenopathy in his head neck region, upper chest, or axilla  Lungs: CTA b/l  no wheezes  CV: RRR  no gallop  Abd: Soft, no tenderness to palpation  Ext/Skin: Scatter bruises on both arms. No rashes.     Chronic GVHD          5/14/2025    13:29 3/12/2025    11:51 12/24/2024    11:48   Chronic GVHD   Has chronic gwrns-vevjza-ddef disease developed since the last entry? No No No   Is there persistence of chronic wihkr-gbpelt-dasd disease since the last entry? Yes Yes Yes   Total Chronic Gbadi-Lxlxgs-Novu Disease Score 2 2 2   Subjective Clinician Opinion of Severity Mild Mild Mild       Blood Counts     Recent Labs   Lab Test 07/09/25 0923 05/21/25  1445 05/14/25  1011   HGB 12.3* 12.7* 11.9*   HCT 36.3* 37.0* 34.7*   WBC 9.5 11.2* 10.6    203 210       Chemistries     Basic Panel  Recent Labs   Lab Test 07/09/25 0923 05/28/25  0905 05/21/25  1445    137 137   POTASSIUM 4.1 4.5 4.7   CHLORIDE 105 102 101   CO2 23 23 24   BUN 36.5* 47.8* 41.3*   CR 1.43* 1.38* 1.68*   * 93 117*      Calcium, Magnesium, Phosphorus  Recent Labs   Lab Test 07/09/25 0923 05/28/25  0905 05/21/25  1445 05/14/25  1011 04/29/25  1255 12/24/24  1038 12/20/24  0455 12/19/24  0254 12/18/24  0758   DAMON 10.1 10.4 9.9   < > 9.7   < > 9.2 8.7* 9.0   MAG  --   --   --   --   --   --  1.9 1.7 1.7   PHOS  --  3.0 3.2  --  2.4*   < > 2.9 2.5  --     < > = values in this interval not displayed.      LFTs  Recent Labs   Lab Test 07/09/25 0923 05/28/25  0905 05/21/25  1445 05/14/25  1011   BILITOTAL 0.8  --  0.6 0.5   ALKPHOS 79  --  84 90   AST 32  --  33 39   ALT 22  --  26 31   ALBUMIN 4.2 4.3 4.1 4.3     LDH  Recent Labs   Lab Test 04/06/22  0947   *       ASSESSMENT AND PLAN   Nik Nava is a 65 year old male with Ph Pos ALL, day 1429 s/p sib allo stem cell transplant.     1. Acute lymphoblastic leukemia, [place] chromosome positive. S/p VERÓNICA allo sib PBSCT. Has not received TKI maintenance due to active  GVHD.  Restaging at 1Y consistent with MRD negative CR. BM % donor. FISH No evidence of BCR::ABL1 fusion. CG No recipient (male) cells or cells with a t(9;22) or other clonal chromosomal abnormality were detected among the 20 metaphases analyzed.    - BCR::ABL monitoring quarterly     2. HEME:   Counts recovered, transfusion independent.     Anemia of chronic disease  Fluctuated during prior infection. Periodically monitor ferritin.     3. GVHD  Ocular GVHD - NIH score 1  Follows with ophthalmology.  - Currently using scleral lenses.      Oral GVHD - NIH score 1  Lichenoid change of L buccal mucosa. Mild dry mouth not interfering with PO intake.  - Dexamethasone s/s BID, taper as tolerated    Skin GVHD- history of biopsy proven GVHD of the skin 8/30/2021; resolved.   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      Systemic treatment for GVHD  Corticosteroids - On steroid chronically due to frequent flare of GVH with taper. Had GVHD flare while tapered down to 10 mg daily, so currently on a very slow taper course.  - Able to tolerate taper to prednisone 7.5 mg daily without signs of GVH flare. We will switch to equivalent hydrocortisone (30 mg daily, splitting into 20 mg in AM and 10 mg midday). Continue for the next 2 months, will consider further taper at next visit.      GVHD history  cGVHD therapy started on February 24 2022  - Baseline NIH scoring 2/24/2022: Skin 2 maculopapular rash/erythema with no sclerotic features, mouth score 1 mild symptoms with lichenoid changes less than 25%, eyes score 2 moderate symptoms without new visual impairments due to KCS requiring lubricant eyedrops more than 3 times a day, overall mild scale for her provider     Previous therapies  Tacrolimus - Experienced mild NEGRA, hyperkalemia, hypomagnesemia, and reports some tremors and cramps. Switched to sirolimus Sep 2022.  Sirolimus - Discontinued Oct 2022 due to GVH flare and significant fluid retention and  proteinuria.  Belumosudil - Patient intolerant/with disease flares on tacrolimus and sirolimus as above. This was started on Oct 2022 - skin/liver/oral GVHD inactive, and occular symptoms controlled/symptomatic improvement. Discontinued Oct 2023 due to recurrent infections.  Ruxolitinib - Started in Dec 2024 in an attempt to taper him off of steroid. However, was feeling unwell (coincide with viral infection), decision was made to discontinue.     4. ID  PJP pneumonia  Recent history of persistent fever with patchy peribronchovascular groundglass opacities on CT (10/21/2024). Extensive work-up revealed PJP positive from BAL (11/1/2024). Completed high-dose bactrim for 21 days per ID recommendation     Rhinovirus infection  Parainfluenza infection  Tested positive for rhinovirus on 11/18/2024 after he developed fatigue, malaise, URI symptoms. Also had parainfluenza URI in Dec 2024.     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 Up to date for influenza, COVID, and RSV vaccines.  Complete pneumococcal, HiB, and meningococcal vaccination.  - CD4, IgG, Rubella IgG with next lab draw to guide MMR vaccinations     5. 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  Baseline Cr around 1.2-1.3. Cr slightly elevated today, will continue to monitor  - Torsemide 10 mg daily per nephrology recommendations     6. Endo:   - Hx of graves disease; On synthroid follows with endocrine  - HLD; On rosuvastatin     7. Psych:   - Situational anxiety; Lexapro 10mg daily.        Plan:  -Lower prednisone from 7.5 mg daily, to 7 mg daily.  -Continue acyclovir,  Micah-Marybeth K, Bactrim prophylaxis  -consult endocrine to explore limits of prednisone taper given long term use and HPA axis suppression  -Return to clinic in 3 months    The longitudinal plan of care for the diagnosis(es)/condition(s) as documented were addressed during this visit. Due to the added complexity in care, I will continue to support Nik in the subsequent management and with ongoing continuity of care.      30 minutes spent on the date of the encounter doing chart review, history and exam, lab review, documentation and coordniating care.    DOMINIQUE BRITTON MD  July 9, 2025       Again, thank you for allowing me to participate in the care of your patient.        Sincerely,        DOMINIQUE BRITTON MD    Electronically signed

## 2025-07-10 ENCOUNTER — MYC MEDICAL ADVICE (OUTPATIENT)
Dept: TRANSPLANT | Facility: CLINIC | Age: 65
End: 2025-07-10
Payer: MEDICARE

## 2025-07-10 ENCOUNTER — PATIENT OUTREACH (OUTPATIENT)
Dept: CARE COORDINATION | Facility: CLINIC | Age: 65
End: 2025-07-10
Payer: MEDICARE

## 2025-07-10 DIAGNOSIS — Z94.81 STATUS POST BONE MARROW TRANSPLANT (H): ICD-10-CM

## 2025-07-10 DIAGNOSIS — D89.813 GVHD AS COMPLICATION OF BONE MARROW TRANSPLANT (H): Primary | ICD-10-CM

## 2025-07-10 DIAGNOSIS — T86.09 GVHD AS COMPLICATION OF BONE MARROW TRANSPLANT (H): Primary | ICD-10-CM

## 2025-07-14 ENCOUNTER — PATIENT OUTREACH (OUTPATIENT)
Dept: CARE COORDINATION | Facility: CLINIC | Age: 65
End: 2025-07-14
Payer: MEDICARE

## 2025-07-29 ENCOUNTER — VIRTUAL VISIT (OUTPATIENT)
Dept: INFECTIOUS DISEASES | Facility: CLINIC | Age: 65
End: 2025-07-29
Attending: INTERNAL MEDICINE
Payer: MEDICARE

## 2025-07-29 VITALS — SYSTOLIC BLOOD PRESSURE: 124 MMHG | DIASTOLIC BLOOD PRESSURE: 68 MMHG

## 2025-07-29 DIAGNOSIS — C91.01 ACUTE LYMPHOBLASTIC LEUKEMIA (ALL) IN REMISSION (H): ICD-10-CM

## 2025-07-29 DIAGNOSIS — D89.813 GVHD AS COMPLICATION OF BONE MARROW TRANSPLANT (H): Primary | ICD-10-CM

## 2025-07-29 DIAGNOSIS — T86.09 GVHD AS COMPLICATION OF BONE MARROW TRANSPLANT (H): Primary | ICD-10-CM

## 2025-07-29 DIAGNOSIS — B59 PNEUMONIA OF BOTH UPPER LOBES DUE TO PNEUMOCYSTIS JIROVECII (H): ICD-10-CM

## 2025-07-29 ASSESSMENT — PAIN SCALES - GENERAL: PAINLEVEL_OUTOF10: NO PAIN (0)

## 2025-07-29 NOTE — NURSING NOTE
Current patient location: cabin    Is the patient currently in the state of [place]? YES    Visit mode: VIDEO    If the visit is dropped, the patient can be reconnected by:VIDEO VISIT: Text to cell phone:   Telephone Information:   Mobile [phone]   Mobile [phone]       Will anyone else be joining the visit? NO  (If patient encounters technical issues they should call [phone]    Are changes needed to the allergy or medication list? No    Are refills needed on medications prescribed by this physician? NO    Rooming Documentation:  Not applicable    Reason for visit: NAZIA Tran VVF

## 2025-07-29 NOTE — PROGRESS NOTES
Virtual Visit Details    Type of service:  Video Visit   Video Start Time: 3:01 PM  Video End Time:3:28 PM    Originating Location (pt. Location): Home    Distant Location (provider location):  Off-site  Platform used for Video Visit: [facility] [address] [address]  Phone: [phone]  Fax: [phone]    Patient:  Nik Nava, Date of birth 1960  Date of Visit:  7/29/2025   Referring Provider Alba Markham MD  Reason for visit: fever of unknown origin    Recommendations   PJP Pneumonia  CD4+ >200 in 5/2025 and has been on <10 mg/day of steroids since   Would check CD4+ count at next blood draw to begin risk assessment for discontinuing TMP/SMX  Would continue TMP/SMX until his next oncology visit. If prednisone remains at 7.5 or less then we will likely discontinue.     Functional Asplenia  Continue PCN V for PPx indefinitely   Up-to-date on pneumococcal and HIB vaccines   Completed primary meningitis series. Continued vaccination as follows:   Menveo (MenACWY): Completed 2/2 doses of primary series in 2/2025. Should get boosters every 5 years  Bexsero (MenB): Completed 2/2 doses in 2/2025. Should get boosters at 1 year and every 2-3 years thereafter  Other  Continue Valtrex PPx indefinitely   Vaccines  Up-to-date on COVID-19 booster (last 3/2025)  Did discuss Hepatitis A vaccine today. He will consider getting this.   Will discuss MMR vaccine again at our next visit.   Typically, the guidance regarding MMR vaccine is that it is relatively safe for people on <20 mg/day of prednisone. I think it would be very low risk if his dose is <10 mg/day for 4+ months.     Follow-up in 2-3 months to discuss discontinuing MMR vaccine and stopping TMP/SMX    I spent 55 minutes reviewing the patient, reviewing the medical record, and interpreting patient findings, and coordinating care     The  longitudinal plan of care for the PJP Pneumonia, functional asplenia, and GVHD as documented were addressed during this visit. Due to the added complexity in care, I will continue to support Nik in the subsequent management and with ongoing continuity of care.    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 He is is currently on 15 mg/day prednisone and dex swish and swallow. We are seeing him in clinic today for unexplained fevers and pulmonary GGOs.     # PJP pneumonia   # Chronic steroid use (currently on equivalent of 7.5 mg/day since 3/2025)   Unexplained fevers from 10/24-11/5 with CT chest (10/21) with peribronchovascular GGOs. BAL from 11/1 positive for PJP. At this point, he had been on 15 mg/kg/day for quite some time. Started on TMP/SMX at 15 mg/kg/day on 11/18/24. Developed nausea, vomiting, and hyperkalemia on 11/25/24. Transitioned to atovaquone on 11/25 and completed 21 day course of treatment (Dates: 11/18-12/10). CT chest from 3/25 with near complete resolution of previous GGOs.      He has been on TMP/SMX PPx since 12/10/2025. Currently, he is on a prolonged steroid taper. Has been on 7.5 mg/day equivalent since 3/12/2025. His CD4+ count has always been >500 (last checked 5/14/25) and his steroids have been >10 mg/day since 3/2025. As of 5/2025, he has been on 7-7.5 mg/day of prednisone. I think we can continue the TMP/SMX for another ~2-3 months. As long as he remains on a dose <10 mg/day prednisone at that time, we can stop his TMP/SMX.     #Functional asplenia  On PCN V PPx  Completed initial series for MenABWY and MenB.   Up-to-date on PCV-20 and HIB    Transplant Checklist  - QTc interval: 446 (11/25/24)  - Pneumocystis prophylaxis: TMP/SMX MWF. Previously on pentamidine, but stopped in 6/2023 when CD4>200. PJP PCR from  Bal positive on 11/1/2024. Started on TMP/SMX at 15 mg/kg/day on 11/18/24. Transitioned to atovaquone on 11/25/24 due to hyperkalemia. Transitioned to TMP/SMX DS on 12/11/2024.   - Bacterial prophylaxis: Penicillin for asplenia   - Fungal prophylaxis: None.   - Serostatus & viral prophylaxis: CMV D+/R+, HSV ?, EBV + Acyclovir. Developed recurrent oral HSV after stopping acyclovir.   - Immunization status: Up-to-date on seasonal COVID-19 (last on 3/2025) and influenza, Shingrix, RSV (8/2024), PCV-20 (5/2024), HBV, and Tetanus (5/2024).  Has gotten 2 doses of MenACWY and MenB (last 2/2025). Will need 1 year booster of MenB in 2/2026.   - Gamma globulin status:   Recent Labs   Lab Test 05/21/25  1445   *     - Antibiotic allergies: Rash with bactrim. Tolerated TMP/SMX OK in 11/2024 so this was removed as an allergy.      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 Bone biopsy from 3/31/23 without culture growth. No cellular material present. Treated with 8 weeks IV CTX (end 5/22/2023). Re=admitted 8/2023 with increasing back pain and progression of discitis/osteomyelitis. Treated with vancomycin and ertapenem for 8 weeks. Transitioned to PO clindamycin-->doxycycline and Augmentin for an additional 4 weeks. Repeat spinal imaging on 11/29 with significant improvement.    COVID-19 x2: Diagnosed last in 1/2023. Treated with Paxlovid  CMV Viremia: Diagnosed in 4/2022. Treated with Valcyte 900 mg BID.   Oral Herpes : Initialy diagnosed in 12/2023. Treated with Valtrex with subsequent recurrence. Has been on acyclvoir PPx since 1/20/2024 without recurrence.   RSV PNA (3/26/2024): S/p ribavirin   Interval Events   Since seen by me in clinic on 4/1/2025, he has been doing well. He had a visit with Dr. Cote on 7/9. He has been able to taper to 7 mg/day of prednisone without significant GVHD flair. He does have a bit of  irritation in his mouth, but this is tolerable. He continues with IVIG intermittently (last in 4/2025).     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 Course c/b recurrent oral HSV as well as skin, ocular, oral, and liver cGVHD. He is is currently on 15 mg/day prednisone and dex swish and swallow. We are seeing him in clinic today for unexplained fevers and pulmonary GGOs.     Elevated temp starting on 10/14. Was 101.8 on 10/24. Had fatigue, muscle aches, and headaches. Bronchoscopy 11/01. Since 11/5 his temp has been normal.  Feels well except for fatigue and SOB with exertion since the bronchoscopy. No cough, sore throat, nausea, vomiting, diarrhea. No recent sick contacts. He completed a 7d course of doxycycline (10/18 started). Has been on posaconazole since 11/2.     Fever history is as follows:   10/24/24: Developed fevers to 101 with early morning chills and shivering. No other localizing symptoms.   10/20/24: Started doxyclcine for possible tick-borne illness, given extensive exposure history  10/21/24: CT chest with peribronchovasciular GGOs in apical and superior segments of bilateral lower lobes,   10/25/2024: Seen by Dr. Cote and still noted fevers. CT chest with bilateral GGOs.   11/1/2024: BAL completed with NGTD.     Of note, he was on tacrolimus for his GVHD from 8/2021-9/2022. Switched to sirolimus from 9-10/2022. Has not been on either since. For his prednisone, he has been on steroids since 3/2022. Has been on 15 mg/day since 9/9/2024 (was on 10 previously).       Exposure History   -[place] 2024  -Cabin near [facility]-- demo'd the garage and there was mold found, but he stayed outside of the garage during this time and wore N95  -Hasn't been in water  -No travel to   [place]  -[place], [place], [place] in 2019  -No pets at home. Got scratches from neighbor's cat this past summer and daughter's dog.  -No time in  or prisons.     Vaccines     Immunization History   Administered Date(s) Administered    COVID-19 12+ (MODERNA) 09/24/2024, 03/20/2025    COVID-19 12+ (Pfizer) 11/14/2023    COVID-19 Bivalent 12+ (Pfizer) 11/16/2022    COVID-19 MONOVALENT 12+ (Pfizer) 11/18/2021, 12/14/2021    DTAP,IPV,HIB,HEPB (Vaxelis) 05/09/2024, 05/14/2025    DTaP/HepB/IPV 11/14/2023    Flu 65+ (Fluad) 09/24/2024    HIB (PRP-T) 11/14/2023    Influenza Vaccine 18-64 (Flublok) 10/12/2021    Influenza Vaccine 65+ (FLUAD) 09/08/2023    Influenza,INJ,MDCK,PF,Quad >6mo(Flucelvax) 11/16/2022    Meningococcal ACWY (Menquadfi ) 02/06/2025    Meningococcal ACWY (Menveo ) 11/28/2024    Meningococcal B (Bexsero ) 11/28/2024, 02/11/2025    Pneumo Conj 13-V (2010&after) 11/14/2023    Pneumococcal 20 valent Conjugate (Prevnar 20) 05/09/2024, 05/14/2025    RSV Vaccine (Arexvy) 08/13/2024    TDAP (Adacel,Boostrix) 10/27/2016    Td (Adult), Adsorbed 07/01/2006    Zoster recombinant adjuvanted (Shingrix) 11/14/2023, 01/09/2024       Medications     Current Outpatient Medications   Medication Sig Dispense Refill    acyclovir (ZOVIRAX) 400 MG tablet Take 2 tablets (800 mg) by mouth every 12 hours 360 tablet 3    dexAMETHasone alcohol-free (DECADRON) 0.1 MG/ML solution Swish and spit 10 mLs (1 mg) in mouth 2 times daily. 500 mL 2    escitalopram (LEXAPRO) 10 MG tablet Take 1 tablet (10 mg) by mouth daily 30 tablet 3    hydrocortisone (CORTEF) 10 MG tablet Take 2 tablets (20 mg) by mouth daily AND 1 tablet (10 mg) daily at 2 pm. 180 tablet 1    levothyroxine (SYNTHROID/LEVOTHROID) 112 MCG tablet Take 1 tablet (112 mcg) by mouth daily. 90 tablet 4    multivitamin w/minerals (MULTI-VITAMIN) tablet Take 1 tablet by mouth daily.      nicotine polacrilex (NICORETTE) 4 MG gum Place 4 mg inside cheek daily.       Nutritional Supplements (ENSURE COMPLETE SHAKE) LIQD Take 11 mLs by mouth every morning      Omega-3 Fatty Acids (FISH OIL) 1200 MG capsule Take 1,200 mg by mouth daily.      omeprazole (PRILOSEC) 40 MG DR capsule Take 1 capsule (40 mg) by mouth daily. 30 capsule 3    PANTOTHENIC ACID PO Take 100 mg by mouth daily.      penicillin V (VEETID) 250 MG tablet Take 1 tablet (250 mg) by mouth 2 times daily. 60 tablet 2    predniSONE (DELTASONE) 1 MG tablet Take 2 tablets (2 mg) by mouth daily. Take with 1 ( 5mg) for a total of 7 mg daily. 180 tablet 3    predniSONE (DELTASONE) 5 MG tablet Take with one (2.5 mg) tablet for a total of 7.5 mg daily. 50 tablet 1    Riboflavin 100 MG TABS Take by mouth.      rosuvastatin (CRESTOR) 5 MG tablet Take 1 tablet (5 mg) by mouth daily. 30 tablet 3    sulfamethoxazole-trimethoprim (BACTRIM DS) 800-160 MG tablet Take 1 tablet by mouth Every Mon, Wed, Fri Morning.      tacrolimus (PROTOPIC) 0.1 % external ointment Apply topically 2 times daily 30 g 1    torsemide (DEMADEX) 5 MG tablet Take 1 tablet (5 mg) by mouth daily. 90 tablet 3    Vitamin D3 (CHOLECALCIFEROL) 25 mcg (1000 units) tablet Take 1 tablet by mouth daily.       No current facility-administered medications for this visit.          Physical Exam     Physical Exam     Vital signs:  There were no vitals taken for this visit.    GENERAL: alert and no distress. Sitting up in chair and talking with wife on his deck at his cabin.   RESP: No increased work of breathing or respiratory distress.   CV: No evidence of cyanosis.   Skin: NO skin rashes/lesions     Data     Data   Laboratory data and imaging listed below was reviewed prior to this encounter.   CT chest (10/21/2024) with patchy peribronchovascular GGOs with apical predominance. BAL (11/1/2024) without growth to date and negative BAL galactomannan (0.06). Blood cutlure (10/15/21) NGTD. RVP/influenza/SARS CoV-2 (10/15/24), Lyme antibody (10/18/24), CrAG (10/18/24) all  negative. CMV VL negative (10/25) and EBV VL <35 (10/25/24). Nocardia (11/1/24) negative. Fungal/yeast culture (11/1/24) negative. Leukocytosis with elevated neutrophils 10/18/24 and 10/25/24.          ----- Service Performed and Documented by Resident or Fellow ------

## 2025-07-29 NOTE — LETTER
7/29/2025       RE: Nik Nava  [address]  [facility] [address]     Dear Colleague,    Thank you for referring your patient, Nik Nava, to the [facility] [place] [facility]. Please see a copy of my visit note below.      Virtual Visit Details    Type of service:  Video Visit   Video Start Time: 3:01 PM  Video End Time:3:28 PM    Originating Location (pt. Location): Home    Distant Location (provider location):  Off-site  Platform used for Video Visit: [facility] [address] [address]  Phone: [phone]  Fax: [phone]    Patient:  Nik Nava, Date of birth 1960  Date of Visit:  7/29/2025   Referring Provider Alba Markham MD  Reason for visit: fever of unknown origin    Recommendations   PJP Pneumonia  CD4+ >200 in 5/2025 and has been on <10 mg/day of steroids since   Would check CD4+ count at next blood draw to begin risk assessment for discontinuing TMP/SMX  Would continue TMP/SMX until his next oncology visit. If prednisone remains at 7.5 or less then we will likely discontinue.     Functional Asplenia  Continue PCN V for PPx indefinitely   Up-to-date on pneumococcal and HIB vaccines   Completed primary meningitis series. Continued vaccination as follows:   Menveo (MenACWY): Completed 2/2 doses of primary series in 2/2025. Should get boosters every 5 years  Bexsero (MenB): Completed 2/2 doses in 2/2025. Should get boosters at 1 year and every 2-3 years thereafter  Other  Continue Valtrex PPx indefinitely   Vaccines  Up-to-date on COVID-19 booster (last 3/2025)  Did discuss Hepatitis A vaccine today. He will consider getting this.   Will discuss MMR vaccine again at our next visit.   Typically, the guidance regarding MMR vaccine is that it is relatively safe for people on <20  mg/day of prednisone. I think it would be very low risk if his dose is <10 mg/day for 4+ months.     Follow-up in 2-3 months to discuss discontinuing MMR vaccine and stopping TMP/SMX    I spent 55 minutes reviewing the patient, reviewing the medical record, and interpreting patient findings, and coordinating care     The longitudinal plan of care for the PJP Pneumonia, functional asplenia, and GVHD as documented were addressed during this visit. Due to the added complexity in care, I will continue to support Nik in the subsequent management and with ongoing continuity of care.    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 He is is currently on 15 mg/day prednisone and dex swish and swallow. We are seeing him in clinic today for unexplained fevers and pulmonary GGOs.     # PJP pneumonia   # Chronic steroid use (currently on equivalent of 7.5 mg/day since 3/2025)   Unexplained fevers from 10/24-11/5 with CT chest (10/21) with peribronchovascular GGOs. BAL from 11/1 positive for PJP. At this point, he had been on 15 mg/kg/day for quite some time. Started on TMP/SMX at 15 mg/kg/day on 11/18/24. Developed nausea, vomiting, and hyperkalemia on 11/25/24. Transitioned to atovaquone on 11/25 and completed 21 day course of treatment (Dates: 11/18-12/10). CT chest from 3/25 with near complete resolution of previous GGOs.      He has been on TMP/SMX PPx since 12/10/2025. Currently, he is on a prolonged steroid taper. Has been on 7.5 mg/day equivalent since 3/12/2025. His CD4+ count has always been >500 (last checked 5/14/25) and his steroids have been >10 mg/day since 3/2025. As of 5/2025, he has been on 7-7.5 mg/day of prednisone. I think we can continue the TMP/SMX for another ~2-3 months. As long as he remains on a dose <10 mg/day prednisone at that  time, we can stop his TMP/SMX.     #Functional asplenia  On PCN V PPx  Completed initial series for MenABWY and MenB.   Up-to-date on PCV-20 and HIB    Transplant Checklist  - QTc interval: 446 (11/25/24)  - Pneumocystis prophylaxis: TMP/SMX MWF. Previously on pentamidine, but stopped in 6/2023 when CD4>200. PJP PCR from Bal positive on 11/1/2024. Started on TMP/SMX at 15 mg/kg/day on 11/18/24. Transitioned to atovaquone on 11/25/24 due to hyperkalemia. Transitioned to TMP/SMX DS on 12/11/2024.   - Bacterial prophylaxis: Penicillin for asplenia   - Fungal prophylaxis: None.   - Serostatus & viral prophylaxis: CMV D+/R+, HSV ?, EBV + Acyclovir. Developed recurrent oral HSV after stopping acyclovir.   - Immunization status: Up-to-date on seasonal COVID-19 (last on 3/2025) and influenza, Shingrix, RSV (8/2024), PCV-20 (5/2024), HBV, and Tetanus (5/2024).  Has gotten 2 doses of MenACWY and MenB (last 2/2025). Will need 1 year booster of MenB in 2/2026.   - Gamma globulin status:   Recent Labs   Lab Test 05/21/25  1445   *     - Antibiotic allergies: Rash with bactrim. Tolerated TMP/SMX OK in 11/2024 so this was removed as an allergy.      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 Bone biopsy from 3/31/23 without culture growth. No cellular material present. Treated with 8 weeks IV CTX (end 5/22/2023). Re=admitted 8/2023 with increasing back pain and progression of discitis/osteomyelitis. Treated with vancomycin and ertapenem for 8 weeks. Transitioned to PO clindamycin-->doxycycline and Augmentin for an additional 4 weeks. Repeat spinal imaging on 11/29 with significant improvement.    COVID-19 x2: Diagnosed last in 1/2023. Treated with Paxlovid  CMV Viremia: Diagnosed in 4/2022. Treated with Valcyte 900 mg BID.   Oral Herpes : Initialy diagnosed in 12/2023. Treated with Valtrex with subsequent recurrence. Has been  on acyclvoir PPx since 1/20/2024 without recurrence.   RSV PNA (3/26/2024): S/p ribavirin   Interval Events   Since seen by me in clinic on 4/1/2025, he has been doing well. He had a visit with Dr. oCte on 7/9. He has been able to taper to 7 mg/day of prednisone without significant GVHD flair. He does have a bit of irritation in his mouth, but this is tolerable. He continues with IVIG intermittently (last in 4/2025).     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 Course c/b recurrent oral HSV as well as skin, ocular, oral, and liver cGVHD. He is is currently on 15 mg/day prednisone and dex swish and swallow. We are seeing him in clinic today for unexplained fevers and pulmonary GGOs.     Elevated temp starting on 10/14. Was 101.8 on 10/24. Had fatigue, muscle aches, and headaches. Bronchoscopy 11/01. Since 11/5 his temp has been normal.  Feels well except for fatigue and SOB with exertion since the bronchoscopy. No cough, sore throat, nausea, vomiting, diarrhea. No recent sick contacts. He completed a 7d course of doxycycline (10/18 started). Has been on posaconazole since 11/2.     Fever history is as follows:   10/24/24: Developed fevers to 101 with early morning chills and shivering. No other localizing symptoms.   10/20/24: Started doxyclcine for possible tick-borne illness, given extensive exposure history  10/21/24: CT chest with peribronchovasciular GGOs in apical and superior segments of bilateral lower lobes,   10/25/2024: Seen by Dr. Cote and still noted fevers. CT chest with bilateral GGOs.   11/1/2024: BAL completed with NGTD.     Of note, he was on tacrolimus for his GVHD from 8/2021-9/2022. Switched to sirolimus from 9-10/2022. Has not been on either since. For his prednisone, he  has been on steroids since 3/2022. Has been on 15 mg/day since 9/9/2024 (was on 10 previously).       Exposure History   -[place] 2024  -Cabin near [place]-- demo'd the garage and there was mold found, but he stayed outside of the garage during this time and wore N95  -Hasn't been in water  -No travel to  [place]  -[place] in [place]  -No pets at home. Got scratches from neighbor's cat this past summer and daughter's dog.  -No time in  or prisons.     Vaccines     Immunization History   Administered Date(s) Administered     COVID-19 12+ (MODERNA) 09/24/2024, 03/20/2025     COVID-19 12+ (Pfizer) 11/14/2023     COVID-19 Bivalent 12+ (Pfizer) 11/16/2022     COVID-19 MONOVALENT 12+ (Pfizer) 11/18/2021, 12/14/2021     DTAP,IPV,HIB,HEPB (Vaxelis) 05/09/2024, 05/14/2025     DTaP/HepB/IPV 11/14/2023     Flu 65+ (Fluad) 09/24/2024     HIB (PRP-T) 11/14/2023     Influenza Vaccine 18-64 (Flublok) 10/12/2021     Influenza Vaccine 65+ (FLUAD) 09/08/2023     Influenza,INJ,MDCK,PF,Quad >6mo(Flucelvax) 11/16/2022     Meningococcal ACWY (Menquadfi ) 02/06/2025     Meningococcal ACWY (Menveo ) 11/28/2024     Meningococcal B (Bexsero ) 11/28/2024, 02/11/2025     Pneumo Conj 13-V (2010&after) 11/14/2023     Pneumococcal 20 valent Conjugate (Prevnar 20) 05/09/2024, 05/14/2025     RSV Vaccine (Arexvy) 08/13/2024     TDAP (Adacel,Boostrix) 10/27/2016     Td (Adult), Adsorbed 07/01/2006     Zoster recombinant adjuvanted (Shingrix) 11/14/2023, 01/09/2024       Medications     Current Outpatient Medications   Medication Sig Dispense Refill     acyclovir (ZOVIRAX) 400 MG tablet Take 2 tablets (800 mg) by mouth every 12 hours 360 tablet 3     dexAMETHasone alcohol-free (DECADRON) 0.1 MG/ML solution Swish and spit 10 mLs (1 mg) in mouth 2 times daily. 500 mL 2     escitalopram (LEXAPRO) 10 MG tablet Take 1 tablet (10 mg) by mouth daily 30 tablet 3     hydrocortisone (CORTEF) 10 MG tablet Take 2  tablets (20 mg) by mouth daily AND 1 tablet (10 mg) daily at 2 pm. 180 tablet 1     levothyroxine (SYNTHROID/LEVOTHROID) 112 MCG tablet Take 1 tablet (112 mcg) by mouth daily. 90 tablet 4     multivitamin w/minerals (MULTI-VITAMIN) tablet Take 1 tablet by mouth daily.       nicotine polacrilex (NICORETTE) 4 MG gum Place 4 mg inside cheek daily.       Nutritional Supplements (ENSURE COMPLETE SHAKE) LIQD Take 11 mLs by mouth every morning       Omega-3 Fatty Acids (FISH OIL) 1200 MG capsule Take 1,200 mg by mouth daily.       omeprazole (PRILOSEC) 40 MG DR capsule Take 1 capsule (40 mg) by mouth daily. 30 capsule 3     PANTOTHENIC ACID PO Take 100 mg by mouth daily.       penicillin V (VEETID) 250 MG tablet Take 1 tablet (250 mg) by mouth 2 times daily. 60 tablet 2     predniSONE (DELTASONE) 1 MG tablet Take 2 tablets (2 mg) by mouth daily. Take with 1 ( 5mg) for a total of 7 mg daily. 180 tablet 3     predniSONE (DELTASONE) 5 MG tablet Take with one (2.5 mg) tablet for a total of 7.5 mg daily. 50 tablet 1     Riboflavin 100 MG TABS Take by mouth.       rosuvastatin (CRESTOR) 5 MG tablet Take 1 tablet (5 mg) by mouth daily. 30 tablet 3     sulfamethoxazole-trimethoprim (BACTRIM DS) 800-160 MG tablet Take 1 tablet by mouth Every Mon, Wed, Fri Morning.       tacrolimus (PROTOPIC) 0.1 % external ointment Apply topically 2 times daily 30 g 1     torsemide (DEMADEX) 5 MG tablet Take 1 tablet (5 mg) by mouth daily. 90 tablet 3     Vitamin D3 (CHOLECALCIFEROL) 25 mcg (1000 units) tablet Take 1 tablet by mouth daily.       No current facility-administered medications for this visit.          Physical Exam     Physical Exam    Vital signs:  There were no vitals taken for this visit.    GENERAL: alert and no distress. Sitting up in chair and talking with wife on his deck at his cabin.   RESP: No increased work of breathing or respiratory distress.   CV: No evidence of cyanosis.   Skin: NO skin rashes/lesions     Data      Data  Laboratory data and imaging listed below was reviewed prior to this encounter.   CT chest (10/21/2024) with patchy peribronchovascular GGOs with apical predominance. BAL (11/1/2024) without growth to date and negative BAL galactomannan (0.06). Blood cutlure (10/15/21) NGTD. RVP/influenza/SARS CoV-2 (10/15/24), Lyme antibody (10/18/24), CrAG (10/18/24) all negative. CMV VL negative (10/25) and EBV VL <35 (10/25/24). Nocardia (11/1/24) negative. Fungal/yeast culture (11/1/24) negative. Leukocytosis with elevated neutrophils 10/18/24 and 10/25/24.          ----- Service Performed and Documented by Resident or Fellow ------           Again, thank you for allowing me to participate in the care of your patient.      Sincerely,    Alba Markham MD

## 2025-07-29 NOTE — PATIENT INSTRUCTIONS
It was good to talk with you today. We discussed the following:     Continue the Bactrim until you see me again in clinic. At that point, I think we can likely safely stop it, assuming you are still on a dose of prednisone that is <10 mg/day.   I also think we can safely vaccinate you against the measles (MMR vaccine) at your next visit as long as you remain on low-dose prednisone.   Keep up the excellent infection prevention techniques you are currently using. Do not do thing that would blow up a lot of dust so you breath it in. Also avoid bird droppings.   You should consider getting the Hepatitis A vaccine. It is a two-dose series done 6 months apart.       Follow-up with me in September or October so we can discuss stopping the Bactrim and the MMR vaccine.

## 2025-07-29 NOTE — Clinical Note
Hi, Chris-  Just saw Nik today. I am planning to see him one more time in September/October. If he is on <10 mg/day of prednisone at that time, I would be comfortable stopping the TMP/SMX. I also think he could get an MMR vaccine at that time as well.   Let me know if you have any issues with this plan.   Thanks,   Alba  Koh Procedure Text (Tissue Harvesting Technique): A 15-blade scalpel was used to scrape the skin. The skin scrapings were placed on a glass slide, covered with a coverslip and a KOH solution was applied. Detail Level: Detailed Showing: clustered spores and short wide hyphae Koh Intro Text (From The.....): A KOH prep was ordered and evaluated from the

## 2025-08-06 ENCOUNTER — INFUSION THERAPY VISIT (OUTPATIENT)
Dept: INFUSION THERAPY | Facility: CLINIC | Age: 65
End: 2025-08-06
Attending: INTERNAL MEDICINE
Payer: MEDICARE

## 2025-08-06 DIAGNOSIS — N18.2 CKD (CHRONIC KIDNEY DISEASE) STAGE 2, GFR 60-89 ML/MIN: Primary | ICD-10-CM

## 2025-08-06 PROCEDURE — 99207 PR NO CHARGE LOS: CPT

## 2025-08-06 PROCEDURE — 250N000011 HC RX IP 250 OP 636: Performed by: INTERNAL MEDICINE

## 2025-08-06 RX ORDER — HEPARIN SODIUM (PORCINE) LOCK FLUSH IV SOLN 100 UNIT/ML 100 UNIT/ML
5 SOLUTION INTRAVENOUS EVERY 8 HOURS
Status: DISCONTINUED | OUTPATIENT
Start: 2025-08-06 | End: 2025-08-06 | Stop reason: HOSPADM

## 2025-08-06 RX ADMIN — Medication 5 ML: at 09:01

## 2025-08-12 DIAGNOSIS — D89.813 GVHD AS COMPLICATION OF BONE MARROW TRANSPLANT (H): ICD-10-CM

## 2025-08-12 DIAGNOSIS — T86.09 GVHD AS COMPLICATION OF BONE MARROW TRANSPLANT (H): ICD-10-CM

## 2025-08-12 DIAGNOSIS — Z94.81 STATUS POST BONE MARROW TRANSPLANT (H): ICD-10-CM

## 2025-08-12 RX ORDER — DEXAMETHASONE 0.5 MG/5ML
1 SOLUTION ORAL 2 TIMES DAILY
Qty: 500 ML | Refills: 2 | Status: SHIPPED | OUTPATIENT
Start: 2025-08-12

## 2025-08-13 ENCOUNTER — OFFICE VISIT (OUTPATIENT)
Dept: ENDOCRINOLOGY | Facility: CLINIC | Age: 65
End: 2025-08-13
Attending: INTERNAL MEDICINE
Payer: MEDICARE

## 2025-08-13 VITALS
HEART RATE: 68 BPM | DIASTOLIC BLOOD PRESSURE: 69 MMHG | SYSTOLIC BLOOD PRESSURE: 120 MMHG | BODY MASS INDEX: 34.39 KG/M2 | OXYGEN SATURATION: 96 % | WEIGHT: 268 LBS | HEIGHT: 74 IN

## 2025-08-13 DIAGNOSIS — Z79.52 ON CORTICOSTEROID THERAPY: ICD-10-CM

## 2025-08-13 DIAGNOSIS — Z86.39 HISTORY OF GRAVES' DISEASE: Primary | ICD-10-CM

## 2025-08-13 DIAGNOSIS — E89.0 POSTABLATIVE HYPOTHYROIDISM: ICD-10-CM

## 2025-08-13 DIAGNOSIS — M85.9 LOW BONE DENSITY: ICD-10-CM

## 2025-08-13 ASSESSMENT — PAIN SCALES - GENERAL: PAINLEVEL_OUTOF10: NO PAIN (0)

## 2025-08-18 ENCOUNTER — MYC REFILL (OUTPATIENT)
Dept: TRANSPLANT | Facility: CLINIC | Age: 65
End: 2025-08-18
Payer: MEDICARE

## 2025-08-18 DIAGNOSIS — C91.01 ACUTE LYMPHOBLASTIC LEUKEMIA (ALL) IN REMISSION (H): ICD-10-CM

## 2025-08-18 DIAGNOSIS — D89.813 GVHD AS COMPLICATION OF BONE MARROW TRANSPLANT (H): ICD-10-CM

## 2025-08-18 DIAGNOSIS — T86.09 GVHD AS COMPLICATION OF BONE MARROW TRANSPLANT (H): ICD-10-CM

## 2025-08-18 RX ORDER — PENICILLIN V POTASSIUM 250 MG/1
250 TABLET, FILM COATED ORAL 2 TIMES DAILY
Qty: 60 TABLET | Refills: 2 | Status: SHIPPED | OUTPATIENT
Start: 2025-08-18

## 2025-08-18 RX ORDER — PREDNISONE 5 MG/1
TABLET ORAL
Qty: 50 TABLET | Refills: 1 | Status: SHIPPED | OUTPATIENT
Start: 2025-08-18

## 2025-08-21 ENCOUNTER — MYC REFILL (OUTPATIENT)
Dept: TRANSPLANT | Facility: CLINIC | Age: 65
End: 2025-08-21
Payer: MEDICARE

## 2025-08-21 ENCOUNTER — TELEPHONE (OUTPATIENT)
Dept: OPTOMETRY | Facility: CLINIC | Age: 65
End: 2025-08-21

## 2025-08-21 DIAGNOSIS — Z94.81 BONE MARROW TRANSPLANT STATUS (H): ICD-10-CM

## 2025-08-21 RX ORDER — ACYCLOVIR 400 MG/1
800 TABLET ORAL
Qty: 360 TABLET | Refills: 3 | Status: SHIPPED | OUTPATIENT
Start: 2025-08-21

## 2025-08-26 ENCOUNTER — LAB (OUTPATIENT)
Dept: LAB | Facility: CLINIC | Age: 65
End: 2025-08-26
Payer: MEDICARE

## 2025-08-26 ENCOUNTER — OFFICE VISIT (OUTPATIENT)
Dept: NEPHROLOGY | Facility: CLINIC | Age: 65
End: 2025-08-26
Attending: INTERNAL MEDICINE
Payer: MEDICARE

## 2025-08-26 ENCOUNTER — OFFICE VISIT (OUTPATIENT)
Dept: OPHTHALMOLOGY | Facility: CLINIC | Age: 65
End: 2025-08-26
Attending: OPTOMETRIST
Payer: MEDICARE

## 2025-08-26 VITALS
WEIGHT: 268.4 LBS | HEART RATE: 75 BPM | BODY MASS INDEX: 35.57 KG/M2 | SYSTOLIC BLOOD PRESSURE: 123 MMHG | OXYGEN SATURATION: 96 % | DIASTOLIC BLOOD PRESSURE: 68 MMHG | TEMPERATURE: 98 F | HEIGHT: 73 IN

## 2025-08-26 DIAGNOSIS — Z96.1 PSEUDOPHAKIA OF BOTH EYES: ICD-10-CM

## 2025-08-26 DIAGNOSIS — N18.2 CKD (CHRONIC KIDNEY DISEASE) STAGE 2, GFR 60-89 ML/MIN: Primary | ICD-10-CM

## 2025-08-26 DIAGNOSIS — H16.223 KERATITIS SICCA, BOTH EYES: ICD-10-CM

## 2025-08-26 DIAGNOSIS — H02.834 DERMATOCHALASIS OF BOTH UPPER EYELIDS: ICD-10-CM

## 2025-08-26 DIAGNOSIS — H02.831 DERMATOCHALASIS OF BOTH UPPER EYELIDS: ICD-10-CM

## 2025-08-26 DIAGNOSIS — H16.8 CHEMICAL KERATITIS: Primary | ICD-10-CM

## 2025-08-26 PROCEDURE — 1126F AMNT PAIN NOTED NONE PRSNT: CPT | Performed by: INTERNAL MEDICINE

## 2025-08-26 PROCEDURE — 92014 COMPRE OPH EXAM EST PT 1/>: CPT | Performed by: OPTOMETRIST

## 2025-08-26 PROCEDURE — 3078F DIAST BP <80 MM HG: CPT | Performed by: INTERNAL MEDICINE

## 2025-08-26 PROCEDURE — G2211 COMPLEX E/M VISIT ADD ON: HCPCS | Performed by: INTERNAL MEDICINE

## 2025-08-26 PROCEDURE — 99215 OFFICE O/P EST HI 40 MIN: CPT | Performed by: INTERNAL MEDICINE

## 2025-08-26 PROCEDURE — 3074F SYST BP LT 130 MM HG: CPT | Performed by: INTERNAL MEDICINE

## 2025-08-26 PROCEDURE — 92134 CPTRZ OPH DX IMG PST SGM RTA: CPT | Performed by: OPTOMETRIST

## 2025-08-26 PROCEDURE — G0463 HOSPITAL OUTPT CLINIC VISIT: HCPCS | Performed by: OPTOMETRIST

## 2025-08-26 PROCEDURE — G0463 HOSPITAL OUTPT CLINIC VISIT: HCPCS | Performed by: INTERNAL MEDICINE

## 2025-08-26 PROCEDURE — 92134 CPTRZ OPH DX IMG PST SGM RTA: CPT | Mod: 26 | Performed by: OPTOMETRIST

## 2025-08-26 ASSESSMENT — CONF VISUAL FIELD
OD_SUPERIOR_TEMPORAL_RESTRICTION: 0
OD_NORMAL: 1
OS_NORMAL: 1
OD_INFERIOR_NASAL_RESTRICTION: 0
OS_INFERIOR_TEMPORAL_RESTRICTION: 0
OS_SUPERIOR_NASAL_RESTRICTION: 0
OS_SUPERIOR_TEMPORAL_RESTRICTION: 0
OD_SUPERIOR_NASAL_RESTRICTION: 0
OS_INFERIOR_NASAL_RESTRICTION: 0
OD_INFERIOR_TEMPORAL_RESTRICTION: 0
METHOD: COUNTING FINGERS

## 2025-08-26 ASSESSMENT — VISUAL ACUITY
OS_CC: 20/25
CORRECTION_TYPE: CONTACTS
OD_CC: 20/20
METHOD: SNELLEN - LINEAR
OS_CC+: -2

## 2025-08-26 ASSESSMENT — EXTERNAL EXAM - LEFT EYE: OS_EXAM: NORMAL

## 2025-08-26 ASSESSMENT — EXTERNAL EXAM - RIGHT EYE: OD_EXAM: NORMAL

## 2025-08-26 ASSESSMENT — SLIT LAMP EXAM - LIDS
COMMENTS: DERMATOCHALASIS UPPER LID; MGD
COMMENTS: DERMATOCHALASIS UPPER LID; MGD

## 2025-08-26 ASSESSMENT — TONOMETRY
IOP_METHOD: ICARE
OD_IOP_MMHG: 7
OS_IOP_MMHG: 10

## 2025-08-26 ASSESSMENT — PAIN SCALES - GENERAL: PAINLEVEL_OUTOF10: NO PAIN (0)

## 2025-08-26 ASSESSMENT — PATIENT HEALTH QUESTIONNAIRE - PHQ9: SUM OF ALL RESPONSES TO PHQ QUESTIONS 1-9: 0

## (undated) DEVICE — LINEN TOWEL PACK X5 5464

## (undated) DEVICE — SU ETHILON 10-0 CS160-6 12" 9000G

## (undated) DEVICE — SOL WATER IRRIG 500ML BOTTLE 2F7113

## (undated) DEVICE — PACK CATARACT CUSTOM ASC SEY15CPUMC

## (undated) DEVICE — CATH IV 16X1-1/4 PROTECTIV 326210

## (undated) DEVICE — NDL BX BONE MARROW 11GA 4"

## (undated) DEVICE — GELFOAM 7X12MM

## (undated) DEVICE — Device

## (undated) DEVICE — LINEN GOWN XLG 5407

## (undated) DEVICE — DECANTER BAG 2002S

## (undated) DEVICE — EYE KNIFE STILETTO VISITEC 1.1MM ANG 45DEG SIDEPORT 376620

## (undated) DEVICE — DRSG TEGADERM 4X4 3/4" 1626W

## (undated) DEVICE — TRAY BONE MARROW BIOPSY ASC 640 31-0097A

## (undated) DEVICE — EYE SHIELD PLASTIC

## (undated) DEVICE — GLOVE BIOGEL PI MICRO SZ 7.5 48575

## (undated) DEVICE — BLADE KNIFE SURG 11 371111

## (undated) DEVICE — EYE PACK CUSTOM ANTERIOR 30DEG TIP CENTURION PPK6682-04

## (undated) DEVICE — EYE CANN IRR 25GA CYSTOTOME 581610

## (undated) DEVICE — DRSG PRIMAPORE 02X3" 7133

## (undated) DEVICE — NDL BIOPSY TEMNO 18GAX15CM ACT1815

## (undated) DEVICE — EYE TIP IRRIGATION & ASPIRATION POLYMER CVD 0.3MM 8065751512

## (undated) DEVICE — DRAPE EYE SHEET 8441

## (undated) DEVICE — EYE CANN IRR 27GA ANTERIOR CHAMBER 581280

## (undated) DEVICE — GOWN XLG DISP 9545

## (undated) DEVICE — NDL 18GAX1.5" 305185

## (undated) DEVICE — COVER ULTRASOUND PROBE W/GEL FLEXI-FEEL 6"X58" LF  25-FF658

## (undated) DEVICE — SPECIMEN CONTAINER W/10% BUFFERED FORMALIN 120ML 591201

## (undated) DEVICE — EYE KNIFE SLIT XSTAR VISITEC 2.6MM 45DEG 373726

## (undated) DEVICE — WIRE GUIDE BENSON STR .035X50CM G00661

## (undated) DEVICE — SOL NACL 0.9% INJ 250ML BAG 2B1322Q

## (undated) DEVICE — GLOVE PROTEXIS POWDER FREE SMT 8.0  2D72PT80X

## (undated) RX ORDER — LIDOCAINE HYDROCHLORIDE 10 MG/ML
INJECTION, SOLUTION EPIDURAL; INFILTRATION; INTRACAUDAL; PERINEURAL
Status: DISPENSED
Start: 2021-08-04

## (undated) RX ORDER — HEPARIN SODIUM,PORCINE 10 UNIT/ML
VIAL (ML) INTRAVENOUS
Status: DISPENSED
Start: 2022-02-21

## (undated) RX ORDER — ALBUTEROL SULFATE 0.83 MG/ML
SOLUTION RESPIRATORY (INHALATION)
Status: DISPENSED
Start: 2022-04-12

## (undated) RX ORDER — ALBUTEROL SULFATE 0.83 MG/ML
SOLUTION RESPIRATORY (INHALATION)
Status: DISPENSED
Start: 2022-09-06

## (undated) RX ORDER — ALBUTEROL SULFATE 0.83 MG/ML
SOLUTION RESPIRATORY (INHALATION)
Status: DISPENSED
Start: 2022-02-10

## (undated) RX ORDER — PENTAMIDINE ISETHIONATE 300 MG/300MG
INHALANT RESPIRATORY (INHALATION)
Status: DISPENSED
Start: 2023-05-17

## (undated) RX ORDER — PENTAMIDINE ISETHIONATE 300 MG/300MG
INHALANT RESPIRATORY (INHALATION)
Status: DISPENSED
Start: 2023-03-01

## (undated) RX ORDER — HEPARIN SODIUM (PORCINE) LOCK FLUSH IV SOLN 100 UNIT/ML 100 UNIT/ML
SOLUTION INTRAVENOUS
Status: DISPENSED
Start: 2021-11-15

## (undated) RX ORDER — ACETAMINOPHEN 325 MG/1
TABLET ORAL
Status: DISPENSED
Start: 2022-02-07

## (undated) RX ORDER — ACETAMINOPHEN 325 MG/1
TABLET ORAL
Status: DISPENSED
Start: 2022-08-18

## (undated) RX ORDER — ALBUTEROL SULFATE 0.83 MG/ML
SOLUTION RESPIRATORY (INHALATION)
Status: DISPENSED
Start: 2022-12-27

## (undated) RX ORDER — PENTAMIDINE ISETHIONATE 300 MG/300MG
INHALANT RESPIRATORY (INHALATION)
Status: DISPENSED
Start: 2021-09-07

## (undated) RX ORDER — ALBUTEROL SULFATE 0.83 MG/ML
SOLUTION RESPIRATORY (INHALATION)
Status: DISPENSED
Start: 2021-12-07

## (undated) RX ORDER — ALBUTEROL SULFATE 0.83 MG/ML
SOLUTION RESPIRATORY (INHALATION)
Status: DISPENSED
Start: 2022-11-22

## (undated) RX ORDER — PROPOFOL 10 MG/ML
INJECTION, EMULSION INTRAVENOUS
Status: DISPENSED
Start: 2024-01-15

## (undated) RX ORDER — LIDOCAINE HYDROCHLORIDE 20 MG/ML
SOLUTION OROPHARYNGEAL
Status: DISPENSED
Start: 2024-11-01

## (undated) RX ORDER — ACETAMINOPHEN 325 MG/1
TABLET ORAL
Status: DISPENSED
Start: 2021-11-15

## (undated) RX ORDER — FENTANYL CITRATE 50 UG/ML
INJECTION, SOLUTION INTRAMUSCULAR; INTRAVENOUS
Status: DISPENSED
Start: 2022-04-29

## (undated) RX ORDER — PENTAMIDINE ISETHIONATE 300 MG/300MG
INHALANT RESPIRATORY (INHALATION)
Status: DISPENSED
Start: 2024-04-22

## (undated) RX ORDER — ALBUTEROL SULFATE 0.83 MG/ML
SOLUTION RESPIRATORY (INHALATION)
Status: DISPENSED
Start: 2024-04-22

## (undated) RX ORDER — ACETAMINOPHEN 325 MG/1
TABLET ORAL
Status: DISPENSED
Start: 2024-01-15

## (undated) RX ORDER — PENTAMIDINE ISETHIONATE 300 MG/300MG
INHALANT RESPIRATORY (INHALATION)
Status: DISPENSED
Start: 2023-10-20

## (undated) RX ORDER — PENTAMIDINE ISETHIONATE 300 MG/300MG
INHALANT RESPIRATORY (INHALATION)
Status: DISPENSED
Start: 2022-04-12

## (undated) RX ORDER — LIDOCAINE HYDROCHLORIDE 20 MG/ML
SOLUTION OROPHARYNGEAL
Status: DISPENSED
Start: 2022-04-29

## (undated) RX ORDER — HEPARIN SODIUM (PORCINE) LOCK FLUSH IV SOLN 100 UNIT/ML 100 UNIT/ML
SOLUTION INTRAVENOUS
Status: DISPENSED
Start: 2024-11-01

## (undated) RX ORDER — PENTAMIDINE ISETHIONATE 300 MG/300MG
INHALANT RESPIRATORY (INHALATION)
Status: DISPENSED
Start: 2023-07-25

## (undated) RX ORDER — FENTANYL CITRATE 50 UG/ML
INJECTION, SOLUTION INTRAMUSCULAR; INTRAVENOUS
Status: DISPENSED
Start: 2024-01-15

## (undated) RX ORDER — HEPARIN SODIUM (PORCINE) LOCK FLUSH IV SOLN 100 UNIT/ML 100 UNIT/ML
SOLUTION INTRAVENOUS
Status: DISPENSED
Start: 2022-02-07

## (undated) RX ORDER — PENTAMIDINE ISETHIONATE 300 MG/300MG
INHALANT RESPIRATORY (INHALATION)
Status: DISPENSED
Start: 2023-08-21

## (undated) RX ORDER — LIDOCAINE HYDROCHLORIDE 10 MG/ML
INJECTION, SOLUTION EPIDURAL; INFILTRATION; INTRACAUDAL; PERINEURAL
Status: DISPENSED
Start: 2024-11-01

## (undated) RX ORDER — LIDOCAINE HYDROCHLORIDE 10 MG/ML
INJECTION, SOLUTION EPIDURAL; INFILTRATION; INTRACAUDAL; PERINEURAL
Status: DISPENSED
Start: 2021-09-02

## (undated) RX ORDER — ALBUTEROL SULFATE 0.83 MG/ML
SOLUTION RESPIRATORY (INHALATION)
Status: DISPENSED
Start: 2022-05-13

## (undated) RX ORDER — LIDOCAINE HYDROCHLORIDE 10 MG/ML
INJECTION, SOLUTION EPIDURAL; INFILTRATION; INTRACAUDAL; PERINEURAL
Status: DISPENSED
Start: 2021-07-14

## (undated) RX ORDER — PENTAMIDINE ISETHIONATE 300 MG/300MG
INHALANT RESPIRATORY (INHALATION)
Status: DISPENSED
Start: 2021-10-08

## (undated) RX ORDER — LIDOCAINE HYDROCHLORIDE 10 MG/ML
INJECTION, SOLUTION EPIDURAL; INFILTRATION; INTRACAUDAL; PERINEURAL
Status: DISPENSED
Start: 2022-04-29

## (undated) RX ORDER — PENTAMIDINE ISETHIONATE 300 MG/300MG
INHALANT RESPIRATORY (INHALATION)
Status: DISPENSED
Start: 2024-02-21

## (undated) RX ORDER — HEPARIN SODIUM (PORCINE) LOCK FLUSH IV SOLN 100 UNIT/ML 100 UNIT/ML
SOLUTION INTRAVENOUS
Status: DISPENSED
Start: 2023-08-07

## (undated) RX ORDER — PENTAMIDINE ISETHIONATE 300 MG/300MG
INHALANT RESPIRATORY (INHALATION)
Status: DISPENSED
Start: 2022-11-22

## (undated) RX ORDER — HEPARIN SODIUM (PORCINE) LOCK FLUSH IV SOLN 100 UNIT/ML 100 UNIT/ML
SOLUTION INTRAVENOUS
Status: DISPENSED
Start: 2022-08-18

## (undated) RX ORDER — ACETAMINOPHEN 325 MG/1
TABLET ORAL
Status: DISPENSED
Start: 2023-08-07

## (undated) RX ORDER — ACETAMINOPHEN 325 MG/1
TABLET ORAL
Status: DISPENSED
Start: 2024-02-05

## (undated) RX ORDER — LIDOCAINE HYDROCHLORIDE 40 MG/ML
SOLUTION TOPICAL
Status: DISPENSED
Start: 2024-11-01

## (undated) RX ORDER — PENTAMIDINE ISETHIONATE 300 MG/300MG
INHALANT RESPIRATORY (INHALATION)
Status: DISPENSED
Start: 2024-01-09

## (undated) RX ORDER — PENTAMIDINE ISETHIONATE 300 MG/300MG
INHALANT RESPIRATORY (INHALATION)
Status: DISPENSED
Start: 2022-05-13

## (undated) RX ORDER — ALBUTEROL SULFATE 0.83 MG/ML
SOLUTION RESPIRATORY (INHALATION)
Status: DISPENSED
Start: 2023-01-27

## (undated) RX ORDER — PENTAMIDINE ISETHIONATE 300 MG/300MG
INHALANT RESPIRATORY (INHALATION)
Status: DISPENSED
Start: 2022-03-09

## (undated) RX ORDER — ALBUTEROL SULFATE 0.83 MG/ML
SOLUTION RESPIRATORY (INHALATION)
Status: DISPENSED
Start: 2022-03-09

## (undated) RX ORDER — FENTANYL CITRATE 50 UG/ML
INJECTION, SOLUTION INTRAMUSCULAR; INTRAVENOUS
Status: DISPENSED
Start: 2024-11-01

## (undated) RX ORDER — PENTAMIDINE ISETHIONATE 300 MG/300MG
INHALANT RESPIRATORY (INHALATION)
Status: DISPENSED
Start: 2022-07-07

## (undated) RX ORDER — ALBUTEROL SULFATE 0.83 MG/ML
SOLUTION RESPIRATORY (INHALATION)
Status: DISPENSED
Start: 2022-10-18

## (undated) RX ORDER — HEPARIN SODIUM,PORCINE 10 UNIT/ML
VIAL (ML) INTRAVENOUS
Status: DISPENSED
Start: 2022-04-29

## (undated) RX ORDER — ALBUTEROL SULFATE 0.83 MG/ML
SOLUTION RESPIRATORY (INHALATION)
Status: DISPENSED
Start: 2021-09-07

## (undated) RX ORDER — FENTANYL CITRATE 50 UG/ML
INJECTION, SOLUTION INTRAMUSCULAR; INTRAVENOUS
Status: DISPENSED
Start: 2021-08-04

## (undated) RX ORDER — PENTAMIDINE ISETHIONATE 300 MG/300MG
INHALANT RESPIRATORY (INHALATION)
Status: DISPENSED
Start: 2023-09-21

## (undated) RX ORDER — HEPARIN SODIUM (PORCINE) LOCK FLUSH IV SOLN 100 UNIT/ML 100 UNIT/ML
SOLUTION INTRAVENOUS
Status: DISPENSED
Start: 2024-02-05

## (undated) RX ORDER — ONDANSETRON 2 MG/ML
INJECTION INTRAMUSCULAR; INTRAVENOUS
Status: DISPENSED
Start: 2024-01-15

## (undated) RX ORDER — ALBUTEROL SULFATE 0.83 MG/ML
SOLUTION RESPIRATORY (INHALATION)
Status: DISPENSED
Start: 2024-03-22

## (undated) RX ORDER — PENTAMIDINE ISETHIONATE 300 MG/300MG
INHALANT RESPIRATORY (INHALATION)
Status: DISPENSED
Start: 2023-01-27

## (undated) RX ORDER — ALBUTEROL SULFATE 0.83 MG/ML
SOLUTION RESPIRATORY (INHALATION)
Status: DISPENSED
Start: 2022-07-07

## (undated) RX ORDER — PENTAMIDINE ISETHIONATE 300 MG/300MG
INHALANT RESPIRATORY (INHALATION)
Status: DISPENSED
Start: 2022-02-10

## (undated) RX ORDER — PENTAMIDINE ISETHIONATE 300 MG/300MG
INHALANT RESPIRATORY (INHALATION)
Status: DISPENSED
Start: 2022-10-18

## (undated) RX ORDER — CEFAZOLIN SODIUM 2 G/100ML
INJECTION, SOLUTION INTRAVENOUS
Status: DISPENSED
Start: 2021-08-04

## (undated) RX ORDER — PENTAMIDINE ISETHIONATE 300 MG/300MG
INHALANT RESPIRATORY (INHALATION)
Status: DISPENSED
Start: 2023-04-11

## (undated) RX ORDER — GABAPENTIN 300 MG/1
CAPSULE ORAL
Status: DISPENSED
Start: 2022-08-18

## (undated) RX ORDER — HEPARIN SODIUM,PORCINE 10 UNIT/ML
VIAL (ML) INTRAVENOUS
Status: DISPENSED
Start: 2021-08-04

## (undated) RX ORDER — FENTANYL CITRATE 50 UG/ML
INJECTION, SOLUTION INTRAMUSCULAR; INTRAVENOUS
Status: DISPENSED
Start: 2024-02-05

## (undated) RX ORDER — LIDOCAINE HYDROCHLORIDE AND EPINEPHRINE 10; 10 MG/ML; UG/ML
INJECTION, SOLUTION INFILTRATION; PERINEURAL
Status: DISPENSED
Start: 2024-11-01

## (undated) RX ORDER — PENTAMIDINE ISETHIONATE 300 MG/300MG
INHALANT RESPIRATORY (INHALATION)
Status: DISPENSED
Start: 2021-12-07

## (undated) RX ORDER — PENTAMIDINE ISETHIONATE 300 MG/300MG
INHALANT RESPIRATORY (INHALATION)
Status: DISPENSED
Start: 2023-06-21

## (undated) RX ORDER — ALBUTEROL SULFATE 0.83 MG/ML
SOLUTION RESPIRATORY (INHALATION)
Status: DISPENSED
Start: 2021-10-08

## (undated) RX ORDER — ALBUTEROL SULFATE 0.83 MG/ML
SOLUTION RESPIRATORY (INHALATION)
Status: DISPENSED
Start: 2024-02-21

## (undated) RX ORDER — PENTAMIDINE ISETHIONATE 300 MG/300MG
INHALANT RESPIRATORY (INHALATION)
Status: DISPENSED
Start: 2022-09-06

## (undated) RX ORDER — PENTAMIDINE ISETHIONATE 300 MG/300MG
INHALANT RESPIRATORY (INHALATION)
Status: DISPENSED
Start: 2023-11-30

## (undated) RX ORDER — PENTAMIDINE ISETHIONATE 300 MG/300MG
INHALANT RESPIRATORY (INHALATION)
Status: DISPENSED
Start: 2022-12-27

## (undated) RX ORDER — PENTAMIDINE ISETHIONATE 300 MG/300MG
INHALANT RESPIRATORY (INHALATION)
Status: DISPENSED
Start: 2024-03-22